# Patient Record
Sex: FEMALE | Race: WHITE | NOT HISPANIC OR LATINO | Employment: OTHER | URBAN - METROPOLITAN AREA
[De-identification: names, ages, dates, MRNs, and addresses within clinical notes are randomized per-mention and may not be internally consistent; named-entity substitution may affect disease eponyms.]

---

## 2017-01-11 ENCOUNTER — GENERIC CONVERSION - ENCOUNTER (OUTPATIENT)
Dept: OTHER | Facility: OTHER | Age: 72
End: 2017-01-11

## 2017-01-25 ENCOUNTER — ALLSCRIPTS OFFICE VISIT (OUTPATIENT)
Dept: OTHER | Facility: OTHER | Age: 72
End: 2017-01-25

## 2017-01-26 ENCOUNTER — ALLSCRIPTS OFFICE VISIT (OUTPATIENT)
Dept: RADIOLOGY | Facility: CLINIC | Age: 72
End: 2017-01-26
Payer: COMMERCIAL

## 2017-02-06 ENCOUNTER — GENERIC CONVERSION - ENCOUNTER (OUTPATIENT)
Dept: OTHER | Facility: OTHER | Age: 72
End: 2017-02-06

## 2017-03-16 DIAGNOSIS — R10.9 ABDOMINAL PAIN: ICD-10-CM

## 2017-06-20 ENCOUNTER — ALLSCRIPTS OFFICE VISIT (OUTPATIENT)
Dept: OTHER | Facility: OTHER | Age: 72
End: 2017-06-20

## 2017-07-21 DIAGNOSIS — Z12.31 ENCOUNTER FOR SCREENING MAMMOGRAM FOR MALIGNANT NEOPLASM OF BREAST: ICD-10-CM

## 2017-08-29 ENCOUNTER — GENERIC CONVERSION - ENCOUNTER (OUTPATIENT)
Dept: OTHER | Facility: OTHER | Age: 72
End: 2017-08-29

## 2017-09-12 ENCOUNTER — TRANSCRIBE ORDERS (OUTPATIENT)
Dept: ADMINISTRATIVE | Facility: HOSPITAL | Age: 72
End: 2017-09-12

## 2017-09-12 ENCOUNTER — HOSPITAL ENCOUNTER (OUTPATIENT)
Dept: ULTRASOUND IMAGING | Facility: HOSPITAL | Age: 72
Discharge: HOME | End: 2017-09-12
Payer: COMMERCIAL

## 2017-09-12 DIAGNOSIS — R31.9 HEMATURIA: ICD-10-CM

## 2017-09-12 DIAGNOSIS — R10.9 ABDOMINAL PAIN: ICD-10-CM

## 2017-09-12 DIAGNOSIS — R31.9 HEMATURIA SYNDROME: Primary | ICD-10-CM

## 2017-09-12 DIAGNOSIS — R31.9 HEMATURIA SYNDROME: ICD-10-CM

## 2017-09-12 DIAGNOSIS — N14.4 TOXIC NEPHROPATHY, NOT ELSEWHERE CLASSIFIED: ICD-10-CM

## 2017-09-12 DIAGNOSIS — S69.90XA UNSPECIFIED INJURY OF UNSPECIFIED WRIST, HAND AND FINGER(S), INITIAL ENCOUNTER: ICD-10-CM

## 2017-09-12 PROCEDURE — 76770 US EXAM ABDO BACK WALL COMP: CPT

## 2017-09-19 ENCOUNTER — GENERIC CONVERSION - ENCOUNTER (OUTPATIENT)
Dept: OTHER | Facility: OTHER | Age: 72
End: 2017-09-19

## 2017-09-22 ENCOUNTER — APPOINTMENT (OUTPATIENT)
Dept: LAB | Facility: CLINIC | Age: 72
End: 2017-09-22
Payer: COMMERCIAL

## 2017-09-22 ENCOUNTER — GENERIC CONVERSION - ENCOUNTER (OUTPATIENT)
Dept: OTHER | Facility: OTHER | Age: 72
End: 2017-09-22

## 2017-09-22 ENCOUNTER — TRANSCRIBE ORDERS (OUTPATIENT)
Dept: ADMINISTRATIVE | Facility: HOSPITAL | Age: 72
End: 2017-09-22

## 2017-09-22 DIAGNOSIS — R10.32 ABDOMINAL PAIN, LEFT LOWER QUADRANT: ICD-10-CM

## 2017-09-22 DIAGNOSIS — N14.4 TOXIC NEPHROPATHY, NOT ELSEWHERE CLASSIFIED: ICD-10-CM

## 2017-09-22 DIAGNOSIS — N14.4: Primary | ICD-10-CM

## 2017-09-22 LAB
BUN SERPL-MCNC: 19 MG/DL (ref 5–25)
CREAT SERPL-MCNC: 0.94 MG/DL (ref 0.6–1.3)
GFR SERPL CREATININE-BSD FRML MDRD: 60 ML/MIN/1.73SQ M

## 2017-09-22 PROCEDURE — 82565 ASSAY OF CREATININE: CPT

## 2017-09-22 PROCEDURE — 36415 COLL VENOUS BLD VENIPUNCTURE: CPT

## 2017-09-22 PROCEDURE — 84520 ASSAY OF UREA NITROGEN: CPT

## 2017-09-23 ENCOUNTER — HOSPITAL ENCOUNTER (OUTPATIENT)
Dept: CT IMAGING | Facility: HOSPITAL | Age: 72
Discharge: HOME | End: 2017-09-23
Payer: COMMERCIAL

## 2017-09-23 ENCOUNTER — GENERIC CONVERSION - ENCOUNTER (OUTPATIENT)
Dept: OTHER | Facility: OTHER | Age: 72
End: 2017-09-23

## 2017-09-23 DIAGNOSIS — R10.32 ABDOMINAL PAIN, LEFT LOWER QUADRANT: ICD-10-CM

## 2017-09-23 PROCEDURE — 74177 CT ABD & PELVIS W/CONTRAST: CPT

## 2017-09-23 RX ADMIN — IODIXANOL 100 ML: 320 INJECTION, SOLUTION INTRAVASCULAR at 12:09

## 2017-09-25 ENCOUNTER — APPOINTMENT (OUTPATIENT)
Dept: LAB | Facility: CLINIC | Age: 72
End: 2017-09-25
Payer: COMMERCIAL

## 2017-09-25 ENCOUNTER — GENERIC CONVERSION - ENCOUNTER (OUTPATIENT)
Dept: OTHER | Facility: OTHER | Age: 72
End: 2017-09-25

## 2017-09-25 DIAGNOSIS — N14.4: ICD-10-CM

## 2017-09-25 LAB
BUN SERPL-MCNC: 12 MG/DL (ref 5–25)
CREAT SERPL-MCNC: 0.9 MG/DL (ref 0.6–1.3)
GFR SERPL CREATININE-BSD FRML MDRD: 63 ML/MIN/1.73SQ M

## 2017-09-25 PROCEDURE — 36415 COLL VENOUS BLD VENIPUNCTURE: CPT

## 2017-09-25 PROCEDURE — 84520 ASSAY OF UREA NITROGEN: CPT

## 2017-09-25 PROCEDURE — 82565 ASSAY OF CREATININE: CPT

## 2017-09-26 ENCOUNTER — GENERIC CONVERSION - ENCOUNTER (OUTPATIENT)
Dept: OTHER | Facility: OTHER | Age: 72
End: 2017-09-26

## 2017-09-27 ENCOUNTER — GENERIC CONVERSION - ENCOUNTER (OUTPATIENT)
Dept: OTHER | Facility: OTHER | Age: 72
End: 2017-09-27

## 2017-10-07 ENCOUNTER — APPOINTMENT (EMERGENCY)
Dept: RADIOLOGY | Facility: HOSPITAL | Age: 72
End: 2017-10-07
Payer: COMMERCIAL

## 2017-10-07 ENCOUNTER — HOSPITAL ENCOUNTER (EMERGENCY)
Facility: HOSPITAL | Age: 72
Discharge: HOME | End: 2017-10-07
Attending: EMERGENCY MEDICINE | Admitting: EMERGENCY MEDICINE
Payer: COMMERCIAL

## 2017-10-07 ENCOUNTER — APPOINTMENT (EMERGENCY)
Dept: CT IMAGING | Facility: HOSPITAL | Age: 72
End: 2017-10-07
Payer: COMMERCIAL

## 2017-10-07 VITALS
SYSTOLIC BLOOD PRESSURE: 171 MMHG | DIASTOLIC BLOOD PRESSURE: 74 MMHG | OXYGEN SATURATION: 98 % | WEIGHT: 176.81 LBS | HEART RATE: 83 BPM | BODY MASS INDEX: 34.53 KG/M2 | TEMPERATURE: 99.8 F | RESPIRATION RATE: 16 BRPM

## 2017-10-07 DIAGNOSIS — S52.509A DISTAL RADIUS FRACTURE: Primary | ICD-10-CM

## 2017-10-07 DIAGNOSIS — S20.219A RIB CONTUSION: ICD-10-CM

## 2017-10-07 DIAGNOSIS — S00.03XA CONTUSION OF SCALP, INITIAL ENCOUNTER: ICD-10-CM
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
## 2017-10-07 PROCEDURE — 73110 X-RAY EXAM OF WRIST: CPT

## 2017-10-07 PROCEDURE — 99284 EMERGENCY DEPT VISIT MOD MDM: CPT

## 2017-10-07 PROCEDURE — 70450 CT HEAD/BRAIN W/O DYE: CPT

## 2017-10-07 PROCEDURE — 71101 X-RAY EXAM UNILAT RIBS/CHEST: CPT

## 2017-10-07 PROCEDURE — 72125 CT NECK SPINE W/O DYE: CPT

## 2017-10-07 PROCEDURE — 72100 X-RAY EXAM L-S SPINE 2/3 VWS: CPT

## 2017-10-07 PROCEDURE — 96372 THER/PROPH/DIAG INJ SC/IM: CPT

## 2017-10-07 RX ORDER — ONDANSETRON 4 MG/1
4 TABLET, ORALLY DISINTEGRATING ORAL EVERY 8 HOURS PRN
Qty: 20 TABLET | Refills: 0 | Status: SHIPPED | OUTPATIENT
Start: 2017-10-07 | End: 2018-02-12

## 2017-10-07 RX ORDER — OXYCODONE HYDROCHLORIDE AND ACETAMINOPHEN 5; 325 MG/1; MG/1
1 TABLET ORAL EVERY 6 HOURS PRN
Qty: 28 TABLET | Refills: 0 | Status: SHIPPED | OUTPATIENT
Start: 2017-10-07 | End: 2017-10-17

## 2017-10-07 RX ORDER — GLIMEPIRIDE 4 MG/1
4 TABLET ORAL 2 TIMES DAILY
COMMUNITY
End: 2018-02-12

## 2017-10-07 RX ORDER — TOLTERODINE 4 MG/1
4 CAPSULE, EXTENDED RELEASE ORAL DAILY
COMMUNITY
End: 2018-02-12

## 2017-10-07 RX ORDER — LIDOCAINE 50 MG/G
1 PATCH TOPICAL DAILY
Qty: 30 PATCH | Refills: 0 | Status: SHIPPED | OUTPATIENT
Start: 2017-10-07 | End: 2018-02-12

## 2017-10-07 RX ORDER — METOCLOPRAMIDE 5 MG/1
5 TABLET ORAL 3 TIMES DAILY
COMMUNITY
End: 2018-01-24 | Stop reason: SDUPTHER

## 2017-10-07 RX ORDER — ONDANSETRON 4 MG/1
4 TABLET, ORALLY DISINTEGRATING ORAL ONCE
Status: COMPLETED | OUTPATIENT
Start: 2017-10-07 | End: 2017-10-07

## 2017-10-07 RX ORDER — LIDOCAINE 50 MG/G
1 PATCH TOPICAL ONCE
Status: DISCONTINUED | OUTPATIENT
Start: 2017-10-07 | End: 2017-10-07 | Stop reason: HOSPADM

## 2017-10-07 RX ADMIN — HYDROMORPHONE HYDROCHLORIDE 1 MG: 1 INJECTION, SOLUTION INTRAMUSCULAR; INTRAVENOUS; SUBCUTANEOUS at 14:27

## 2017-10-07 RX ADMIN — ONDANSETRON 4 MG: 4 TABLET, ORALLY DISINTEGRATING ORAL at 15:14

## 2017-10-07 RX ADMIN — LIDOCAINE 1 PATCH: 50 PATCH CUTANEOUS at 15:54

## 2017-10-07 NOTE — ED PROVIDER NOTES
History  Chief Complaint   Patient presents with    Fall     fell in bathroom  struck her head on the tub, has pain in her back and her right wrist, as well as her head     68-year-old female comes out after a fall  Patient states she slipped and fell in her bathroom when she struck her head and she also thinks that she broke her right wrist   Did not lose consciousness remembers everything that happened also complains of some lower back pain, as well as some left lower rib pain  Patient has a spine stimulator in and she is concerned that it is still intact  Patient is on several medications for pain including a 25 mcg Duragesic patch and Percocet for breakthrough pain  Did not take any Percocet today  The patient is not on any blood thinners          History provided by:  Patient   used: No    Fall   Mechanism of injury: fall    Injury location:  Head/neck, shoulder/arm and torso  Head/neck injury location:  Head  Shoulder/arm injury location:  R wrist  Torso injury location:  Back  Incident location:  Bathroom  Time since incident:  2 hours  Arrived directly from scene: no    Fall:     Fall occurred:  Walking and tripped    Impact surface:  Hard floor    Point of impact:  Unable to specify    Entrapped after fall: no    Protective equipment: none    Suspicion of alcohol use: no    Suspicion of drug use: no    Tetanus status:  Up to date  Prior to arrival data:     Bystander interventions:  None    Patient ambulatory at scene: yes      Blood loss:  None    Responsiveness at scene:  Alert    Orientation at scene:  Person, place, situation and time    Loss of consciousness: no      Amnesic to event: no      Airway interventions:  None    Breathing interventions:  None    IV access status:  None    IO access:  None    Fluids administered:  None    Cardiac interventions:  None    Immobilization:  None  Associated symptoms: no abdominal pain, no back pain, no chest pain and no headaches Prior to Admission Medications   Prescriptions Last Dose Informant Patient Reported? Taking? Azilsartan Medoxomil (EDARBI) 40 MG TABS   Yes Yes   Sig: Take by mouth daily  Lutein 10 MG TABS   Yes Yes   Sig: Take by mouth daily  dexlansoprazole (DEXILANT) 60 MG capsule   Yes Yes   Sig: Take 60 mg by mouth daily  fentaNYL (DURAGESIC) 25 mcg/hr   Yes Yes   Sig: Place 1 patch on the skin every third day  glimepiride (AMARYL) 4 mg tablet   Yes Yes   Sig: Take 4 mg by mouth 2 (two) times a day   metFORMIN (GLUCOPHAGE) 1000 MG tablet   Yes Yes   Sig: Take 1,000 mg by mouth 2 (two) times a day with meals   metoclopramide (REGLAN) 5 mg tablet   Yes Yes   Sig: Take 5 mg by mouth 3 (three) times a day   oxyCODONE-acetaminophen (PERCOCET)  mg per tablet   Yes Yes   Sig: Take 1 tablet by mouth every 6 (six) hours as needed for moderate pain     pitavastatin (LIVALO) 2 mg   Yes Yes   Sig: Take 1 mg by mouth daily with dinner  pregabalin (LYRICA) 50 mg capsule   Yes Yes   Sig: Take 50 mg by mouth daily at bedtime as needed     tolterodine (DETROL LA) 4 mg 24 hr capsule   Yes Yes   Sig: Take 4 mg by mouth daily      Facility-Administered Medications: None       Past Medical History:   Diagnosis Date    Acid reflux     Anxiety     Arthritis     Chronic narcotic dependence (HCC)     Chronic pain     Colon polyp     Cystocele     Diabetes mellitus ([facility] [address] )     Diverticulosis     Fibromyalgia     Gastric ulcer     Gastroparesis     Hyperlipidemia     Hypertension     IBS (irritable bowel syndrome)     Nephrolithiasis     Post laminectomy syndrome     Seasonal allergies     Spinal stenosis        Past Surgical History:   Procedure Laterality Date    APPENDECTOMY      CHOLECYSTECTOMY      ESOPHAGOGASTRODUODENOSCOPY N/A 9/28/2016    Procedure: ESOPHAGOGASTRODUODENOSCOPY (EGD); Surgeon: Edgar Alvarado MD;  [facility];   Service:     HERNIA REPAIR      HYSTERECTOMY      LAMINECTOMY  KS COLONOSCOPY FLX DX W/COLLJ SPEC WHEN PFRMD N/A 3/2/2016    Procedure: EGD AND COLONOSCOPY;  Surgeon: Richie Alberto MD;  [facility]; Service: Gastroenterology    KS DILATE ESOPHAGUS N/A 9/28/2016    Procedure: DILATATION ESOPHAGEAL;  Surgeon: Richie Alberto MD;  [facility]; Service: Gastroenterology    KS ESOPHAGOGASTRODUODENOSCOPY TRANSORAL DIAGNOSTIC N/A 7/18/2016    Procedure: ESOPHAGOGASTRODUODENOSCOPY (EGD); Surgeon: Richie Alberto MD;  [facility]; Service: Gastroenterology       History reviewed  No pertinent family history  I have reviewed and agree with the history as documented  Social History   Substance Use Topics    Smoking status: Former Smoker    Smokeless tobacco: Never Used    Alcohol use No        Review of Systems   Constitutional: Negative for fatigue and fever  HENT: Negative for congestion and ear pain  Eyes: Negative for discharge and redness  Respiratory: Negative for apnea, cough, shortness of breath and wheezing  Cardiovascular: Negative for chest pain  Gastrointestinal: Negative for abdominal pain and diarrhea  Endocrine: Negative for cold intolerance and polydipsia  Genitourinary: Negative for difficulty urinating and hematuria  Musculoskeletal: Negative for arthralgias and back pain  Skin: Negative for color change and rash  Allergic/Immunologic: Negative for environmental allergies and immunocompromised state  Neurological: Negative for numbness and headaches  Hematological: Negative for adenopathy  Does not bruise/bleed easily  Psychiatric/Behavioral: Negative for agitation and behavioral problems         Physical Exam  ED Triage Vitals [10/07/17 1358]   Temperature Pulse Respirations Blood Pressure SpO2   99 8 °F (37 7 °C) 90 18 154/70 97 %      Temp Source Heart Rate Source Patient Position - Orthostatic VS BP Location FiO2 (%)   Oral Monitor Lying Right arm --      Pain Score       Worst Possible Pain Physical Exam   Constitutional: She is oriented to person, place, and time  Vital signs are normal  She appears well-developed and well-nourished  Non-toxic appearance  HENT:   Head: Normocephalic and atraumatic  Right Ear: Tympanic membrane and external ear normal    Left Ear: Tympanic membrane and external ear normal    Nose: Nose normal  No rhinorrhea, sinus tenderness or nasal deformity  Mouth/Throat: Uvula is midline and oropharynx is clear and moist  Normal dentition  Eyes: Conjunctivae, EOM and lids are normal  Pupils are equal, round, and reactive to light  Right eye exhibits no discharge  Left eye exhibits no discharge  Neck: Trachea normal and normal range of motion  Neck supple  No JVD present  Carotid bruit is not present  Cardiovascular: Normal rate, regular rhythm, intact distal pulses and normal pulses  No extrasystoles are present  PMI is not displaced  Pulmonary/Chest: Effort normal and breath sounds normal  No accessory muscle usage  No respiratory distress  She has no wheezes  She has no rhonchi  She has no rales  Abdominal: Soft  Normal appearance and bowel sounds are normal  She exhibits no mass  There is no tenderness  There is no rigidity, no rebound and no guarding  Musculoskeletal:        Right shoulder: She exhibits normal range of motion, no bony tenderness, no swelling and no deformity  Cervical back: Normal  She exhibits normal range of motion, no tenderness, no bony tenderness and no deformity  Lymphadenopathy:     She has no cervical adenopathy  She has no axillary adenopathy  Neurological: She is alert and oriented to person, place, and time  She has normal strength and normal reflexes  No cranial nerve deficit or sensory deficit  GCS eye subscore is 4  GCS verbal subscore is 5  GCS motor subscore is 6  Skin: Skin is warm and dry  No rash noted  Psychiatric: She has a normal mood and affect   Her speech is normal and behavior is normal  Nursing note and vitals reviewed  ED Medications  Medications   lidocaine (LIDODERM) 5 % patch 1 patch (1 patch Transdermal Medication Applied 10/7/17 8315)   HYDROmorphone (DILAUDID) 1 mg/mL injection 1 mg (1 mg Intramuscular Given 10/7/17 1427)   ondansetron (ZOFRAN-ODT) dispersible tablet 4 mg (4 mg Oral Given 10/7/17 1514)       Diagnostic Studies  Labs Reviewed - No data to display    XR wrist 3+ views RIGHT   Final Result   Comminuted impacted fracture distal radius with dorsal displacement and dorsal angulation of distal fragment  ##imslh##imslh         Workstation performed: [id number]         CT cervical spine without contrast   Final Result   No cervical spine fracture or traumatic malalignment  Workstation performed: [id number]         CT head without contrast   Final Result   No acute intracranial abnormality  Workstation performed: [id number]         XR lumbar spine 2 or 3 views   Final Result      Diffuse lumbar degenerative disc disease similar to prior CT  Workstation performed: [id number]         XR ribs left w pa chest min 3 views    (Results Pending)       Procedures  Procedures      Phone Contacts  ED Phone Contact    ED Course  ED Course                                MDM  Number of Diagnoses or Management Options  Contusion of scalp, initial encounter: new and requires workup  Distal radius fracture: new and requires workup  Fall, initial encounter: new and requires workup  Rib contusion: new and requires workup     Amount and/or Complexity of Data Reviewed  Tests in the radiology section of CPT®: ordered and reviewed  Independent visualization of images, tracings, or specimens: yes (Wrist x-ray shows distal radius fracture with mild displacement    Rib series shows no fractures)    Risk of Complications, Morbidity, and/or Mortality  General comments: Discussed with patient and her daughter who is a physician that she will need follow-up with Orthopedics this week  Patient's daughter states that she is going to call Dr Oksana Leung to seen evaluate the patient for further treatment  Patient Progress  Patient progress: improved    CritCare Time    Disposition  Final diagnoses:   Distal radius fracture   Fall, initial encounter   Rib contusion   Contusion of scalp, initial encounter     ED Disposition     ED Disposition Condition Comment    Discharge  [address] discharge to home/self care  Condition at discharge: Good        Follow-up Information     Follow up With Specialties Details Why [facility] Schedule an appointment as soon as possible for a visit  [address]  [phone]          Discharge Medication List as of 10/7/2017  3:55 PM      START taking these medications    Details   lidocaine (LIDODERM) 5 % Place 1 patch on the skin daily Remove & Discard patch within 12 hours or as directed by MD, Starting Sat 10/7/2017, Print      ondansetron (ZOFRAN-ODT) 4 mg disintegrating tablet Take 1 tablet by mouth every 8 (eight) hours as needed for nausea or vomiting for up to 7 days, Starting Sat 10/7/2017, Until Sat 10/14/2017, Print      oxyCODONE-acetaminophen (PERCOCET) 5-325 mg per tablet Take 1 tablet by mouth every 6 (six) hours as needed for severe pain for up to 10 days Max Daily Amount: 4 tablets, Starting Sat 10/7/2017, Until Tue 10/17/2017, Print         CONTINUE these medications which have NOT CHANGED    Details   Azilsartan Medoxomil (EDARBI) 40 MG TABS Take by mouth daily  , Until Discontinued, Historical Med      dexlansoprazole (DEXILANT) 60 MG capsule Take 60 mg by mouth daily  , Until Discontinued, Historical Med      fentaNYL (DURAGESIC) 25 mcg/hr Place 1 patch on the skin every third day , Until Discontinued, Historical Med      glimepiride (AMARYL) 4 mg tablet Take 4 mg by mouth 2 (two) times a day, Historical Med      Lutein 10 MG TABS Take by mouth daily  , Until Discontinued, Historical Med      metFORMIN (GLUCOPHAGE) 1000 MG tablet Take 1,000 mg by mouth 2 (two) times a day with meals, Historical Med      metoclopramide (REGLAN) 5 mg tablet Take 5 mg by mouth 3 (three) times a day, Historical Med      oxyCODONE-acetaminophen (PERCOCET)  mg per tablet Take 1 tablet by mouth every 6 (six) hours as needed for moderate pain  , Historical Med      pitavastatin (LIVALO) 2 mg Take 1 mg by mouth daily with dinner , Until Discontinued, Historical Med      pregabalin (LYRICA) 50 mg capsule Take 50 mg by mouth daily at bedtime as needed  , Historical Med      tolterodine (DETROL LA) 4 mg 24 hr capsule Take 4 mg by mouth daily, Historical Med           No discharge procedures on file      ED Provider  Electronically Signed by       Shobha Clemente DO  10/07/17 7909

## 2017-10-07 NOTE — DISCHARGE INSTRUCTIONS
Arm Fracture in Adults   WHAT YOU NEED TO KNOW:   An arm fracture is a crack or break in one or more of the bones in your arm  An arm fracture may be caused by a fall onto an outstretched hand  It may also be caused by trauma from a car accident or a sports injury  Osteoporosis (brittle bones) can increase your risk for a fracture  DISCHARGE INSTRUCTIONS:   Return to the emergency department if:   · The pain in your injured arm does not get better or gets worse, even after you rest and take medicine  · Your injured arm, hand, or fingers feel numb  · Your arm is swollen, red, and feels warm  · Your skin over the arm fracture is swollen, cold, or pale  · You cannot move your arm, hand, or fingers  Contact your healthcare provider if:   · You have a fever  · Your brace or splint becomes wet, damaged, or comes off  · You have questions or concerns about your injury, treatment, or care  Medicines:   · NSAIDs , such as ibuprofen, help decrease swelling and pain  This medicine is available with or without a doctor's order  NSAIDs can cause stomach bleeding or kidney problems in certain people  If you take blood thinner medicine, always ask your healthcare provider if NSAIDs are safe for you  Always read the medicine label and follow directions  · Acetaminophen  decreases pain  It is available without a doctor's order  Ask how much to take and how often to take it  Follow directions  Acetaminophen can cause liver damage if not taken correctly  · Prescription pain medicine  may be given  Ask how to take this medicine safely  · Take your medicine as directed  Contact your healthcare provider if you think your medicine is not helping or if you have side effects  Tell him or her if you are allergic to any medicine  Keep a list of the medicines, vitamins, and herbs you take  Include the amounts, and when and why you take them  Bring the list or the pill bottles to follow-up visits   Carry your medicine list with you in case of an emergency  Follow up with your healthcare provider within 1 week: You may need to see a bone specialist within 3 to 4 days if you need surgery or further treatment for your arm fracture  Write down your questions so you remember to ask them during your visits  Rest:  You should rest your arm as much as possible  Ask your healthcare provider when you can put pressure or weight on your arm  Also ask when you can return to sports or vigorous exercises  Ice:  Apply ice on your arm for 15 to 20 minutes every hour or as directed  Use an ice pack, or put crushed ice in a plastic bag  Cover it with a towel  Ice helps prevent tissue damage and decreases swelling and pain  Elevate:  Elevate your arm above the level of your heart as often as you can  This will help decrease swelling and pain  Prop your arm on pillows or blankets to keep it elevated comfortably  Care for your cast or splint:  Ask your healthcare provider when it is okay to bathe  Do not get your cast or splint wet  Before you take a bath or shower, cover your cast or splint with a plastic bag  Tape the bag to your skin to help keep water out  Hold your arm away from the water in case the bag leaks  · Check the skin around your cast or splint each day for any redness or open skin  · Do not use a sharp or pointed object to scratch your skin under the cast or splint  Physical therapy:  A physical therapist teaches you exercises to help improve movement and strength, and to decrease pain  © 2017 [employer] Information is for End User's use only and may not be sold, redistributed or otherwise used for commercial purposes  All illustrations and images included in CareNotes® are the copyrighted property of [employer]  or [employer]  The above information is an  only  It is not intended as medical advice for individual conditions or treatments   Talk to your doctor, nurse or pharmacist before following any medical regimen to see if it is safe and effective for you

## 2017-10-09 ENCOUNTER — APPOINTMENT (OUTPATIENT)
Dept: RADIOLOGY | Facility: CLINIC | Age: 72
End: 2017-10-09
Payer: COMMERCIAL

## 2017-10-09 ENCOUNTER — ALLSCRIPTS OFFICE VISIT (OUTPATIENT)
Dept: OTHER | Facility: OTHER | Age: 72
End: 2017-10-09

## 2017-10-09 DIAGNOSIS — S69.90XA UNSPECIFIED INJURY OF UNSPECIFIED WRIST, HAND AND FINGER(S), INITIAL ENCOUNTER: ICD-10-CM

## 2017-10-09 PROCEDURE — 73110 X-RAY EXAM OF WRIST: CPT

## 2017-10-10 NOTE — PROGRESS NOTES
Plan  Wrist injury    · * XR WRIST 3+ VIEW RIGHT; Status:Active - Retrospective By Protocol Authorization; Requested GFW:[id number];    · * XR WRIST 3+ VIEW RIGHT; Status:Active; Requested FIP:[id number];     Discussion/Summary    Right distal radius fractureAttempt at reduction of distal radius metaphyseal type  AP view looks perfect the lateral view shows some dorsal translation but the articular surface is neutral  We went over the pros and cons of surgical intervention versus non operative intervention  She would like to proceed with non operative intervention she will come back to see us in 2 weeks  If she has dorsal angulation or worsening translation or impaction we may look at doing surgery  She understands this as well  Chief Complaint  1  Wrist Pain    History of Present Illness  HPI: Patient comes in with regards to her right wrist  She had a fall after losing her balance she tried to grab the shower curtain and fell  She subsequently injured her right wrist  She was taken to ER where x-rays were taken she is placed in a volar splint and told to follow up with the at Orthopedics in the office  Review of Systems    Constitutional: No fever, no chills, feels well, no tiredness, no recent weight gain or loss  Eyes: No complaints of eyesight problems, no red eyes  ENT: no loss of hearing, no nosebleeds, no sore throat  Cardiovascular: No complaints of chest pain, no palpitations, no leg claudication or lower extremity edema  Respiratory: no compliants of shortness of breath, no wheezing, no cough  Gastrointestinal: no complaints of abdominal pain, no constipation, no nausea or diarrhea, no vomiting, no bloody stools  Genitourinary: no complaints of dysuria, no incontinence  Musculoskeletal: as noted in HPI  Integumentary: no complaints of skin rash or lesion, no itching or dry skin, no skin wounds     Neurological: no complaints of headache, no confusion, no numbness or tingling, no dizziness  Endocrine: No complaints of muscle weakness, no feelings of weakness, no frequent urination, no excessive thirst    Psychiatric: No suicidal thoughts, no anxiety, no feelings of depression  Active Problems  1  Abdominal discomfort (789 00) (R10 9)   2  Acute colitis (558 9) (K52 9)   3  Acute diverticulitis (562 11) (K57 92)   4  Acute duodenal ulcer (532 30) (K26 3)   5  Acute gastritis (535 00) (K29 00)   6  Acute left lower quadrant pain (789 04,338 19) (R10 32)   7  Acute sinusitis (461 9) (J01 90)   8  Acute upper respiratory infection (465 9) (J06 9)   9  Allergic rhinitis (477 9) (J30 9)   10  Allergy to iodine compound (V14 8) (Z88 8)   11  Analgesic use (V58 69) (Z79 899)   12  Anxiety (300 00) (F41 9)   13  Arthritis (716 90) (M19 90)   14  Bilateral flank pain (789 09) (R10 9)   15  Bloating (787 3) (R14 0)   16  Change in bowel habits (787 99) (R19 4)   17  Chronic pain syndrome (338 4) (G89 4)   18  Cystocele, midline (618 01) (N81 11)   19  Degenerative lumbar spinal stenosis (724 02) (M48 061)   20  Diabetes mellitus, type 2 (250 00) (E11 9)   21  Disc degeneration, lumbar (722 52) (M51 36)   22  Dyspepsia (536 8) (K30)   23  Edema (782 3) (R60 9)   24  Encounter for gynecological examination with abnormal finding (V72 31) (Z01 411)   25  Encounter for routine gynecological examination (V72 31) (Z01 419)   26  Encounter for screening mammogram for malignant neoplasm of breast (V76 12)    (Z12 31)   27  Fibromyalgia (729 1) (M79 7)   28  Flank pain (789 09) (R10 9)   29  Generalized abdominal pain (789 07) (R10 84)   30  Glaucoma (365 9) (H40 9)   31  Hematuria (599 70) (R31 9)   32  Hernia (553 9) (K46 9)   33  High cholesterol (272 0) (E78 00)   34  Hip Pain Elicited By Motion   35  History of colon polyps (V12 72) (Z86 010)   36  Hyperglycemia (790 29) (R73 9)   37  Hypertension (401 9) (I10)   38  Influenza vaccine needed (V04 81) (Z23)   39   Irritable bowel syndrome (564 1) (K58 9)   40  Lumbar radiculopathy (724 4) (M54 16)   41  Mammogram abnormal (793 80) (R92 8)   42  Medicare annual wellness visit, initial (V70 0) (Z00 00)   43  Myofascial pain syndrome (729 1) (M79 1)   44  Need for pneumococcal vaccination (V03 82) (Z23)   45  Need for TD vaccine (V06 5) (Z23)   46  Nephrolithiasis (592 0) (N20 0)   47  Nephrotoxicity (584 5) (N14 4)   48  Overactive bladder (596 51) (N32 81)   49  Postlaminectomy syndrome, lumbar (722 83) (M96 1)   50  PPD screening test (V74 1) (Z11 1)   51  Screening for diabetic retinopathy (V80 2) (Z13 5)   52  Tuberculosis screening (V74 1) (Z11 1)   53  Urinary incontinence (788 30) (R32)   54  Urinary tract infection (599 0) (N39 0)   55  Vaginal wall prolapse (618 00) (N81 10)   56  Vaginal yeast infection (112 1) (B37 3)   57  Vertigo (780 4) (R42)   58  Visit for screening mammogram (V76 12) (Z12 31)   59  Wrist injury (959 3) (S69 90XA)    Past Medical History   · History of Asthma (493 90) (J45 909)   · History of colonic polyps (V12 72) (Z86 010)   · History of dysfunctional uterine bleeding (V13 29) (Z87 42)   · History of gastroesophageal reflux (GERD) (V12 79) (Z87 19)   · History of hypercholesterolemia (V12 29) (Z86 39)    Surgical History   · History of Appendectomy   · History of Cholecystectomy   · History of Hernia Repair   · History of Hysterectomy   · History of Laminectomy Lumbar   · History of Total Abdominal Hysterectomy With Removal Of Both Ovaries    The surgical history was reviewed and updated today         Family History  Mother    · Denied: Family history of Colon cancer   · Denied: Family history of Crohn's disease   · Denied: Family history of liver disease   · Family history of Lung Cancer (V16 1)  Father    · Denied: Family history of Colon cancer   · Denied: Family history of Crohn's disease   · Denied: Family history of liver disease  Family History    · Family history of Diabetes Mellitus (V18 0)   · Family history of Hypertension (V17 49)   · Family history of Stroke Complications    The family history was reviewed and updated today  Social History   · Denied: History of Alcohol Use (History)   · Denied: History of Current Every Day Smoker (305 1)   · Denied: History of Drug Use (305 90)   · Former smoker (V15 82) (Q90 594)   · Marital History - Currently    · Never A Smoker   · Occupation: Retired  The social history was reviewed and updated today  Current Meds   1  Dicyclomine HCl - 20 MG Oral Tablet; TAKE 1 TABLET Every 6 hours PRN SPASMS; Therapy: 76Hwr7679 to (Evaluate:05Lxc0182)  Requested for: 42Xyj6356; Last   Rx:92Mxl1147 Ordered   2  Edarbi 40 MG Oral Tablet; Take 1 tablet daily Recorded   3  FentaNYL 50 MCG/HR Transdermal Patch 72 Hour; APPLY 1 PATCH EVERY 3 DAYS; Therapy: 45WOO6050 to (Renew:27Oct2017); Last Rx:90Ykj6791 Ordered   4  HydroCHLOROthiazide 25 MG Oral Tablet; TAKE 1 TABLET DAILY; Therapy: 85YYQ0950 to ([facility])  Requested for: 49RAO4240; Last   Rx:85Bhc1576 Ordered   5  Januvia 100 MG Oral Tablet; TAKE 1 TABLET DAILY; Therapy: 41Pgh9966 to (Evaluate:23Hsc8002); Last Rx:35Jgq0778 Ordered   6  Latanoprost 0 005 % Ophthalmic Solution; INSTILL 1 DROP IN BOTH EYES AT   BEDTIME; Therapy: 16Kgt7418 to (Evaluate:22End1246)  Requested for: 89Uev4093; Last   Rx:14Bse4237 Ordered   7  Livalo 2 MG Oral Tablet; Therapy: 57FSC2150 to Recorded   8  Lyrica 50 MG Oral Capsule; Therapy: 84OEN8157 to Recorded   9  Meclizine HCl - 25 MG Oral Tablet; 1/2 to 1 tab tid prn; Therapy: 47VWU1462 to (Gwenette Bones)  Requested for: 14JHI6955; Last   Rx:09Oct2017 Ordered   10  MetFORMIN HCl - 1000 MG Oral Tablet; take one tablet by mouth twice daily; Therapy: 00Hqo3790 to (Evaluate:55Cwn1249); Last Rx:56Ynd7405 Ordered   11  MethylPREDNISolone 32 MG Oral Tablet; 1 tab 12hrs prior, 1 tab 2 hours prior;     Therapy: 41ZGV4041 to (Last Jo Ann Binet)  Requested for: 36UPN5256 Ordered 12  Nitrofurantoin Macrocrystal 50 MG Oral Capsule; TAKE 1 CAPSULE AT BEDTIME; Therapy: 26BYY2889 to (Evaluate:16Apr2017); Last Rx:61Lyl3734 Ordered   13  Oxybutynin Chloride ER 15 MG Oral Tablet Extended Release 24 Hour; Take 1 tablet    twice daily; Therapy: 88FHZ0933 to (Pilo Marsh)  Requested for: 71Bqx1832; Last    Rx:29Cct8638; Status: ACTIVE - Renewal Denied Ordered   14  Oxycodone-Acetaminophen  MG Oral Tablet; TAKE 1 TABLET Every 6 hours PRN; Therapy: 80GJJ7775 to (Last KI:38XOH1930)  Requested for: 45INV8338 Ordered   15  Pantoprazole Sodium 40 MG Oral Tablet Delayed Release; TAKE 1 TABLET TWICE    DAILY 30 MINUTES BEFORE BREAKFAST AND DINNER; Therapy: 73CBS6325 to (Judy Ludwig)  Requested for: 55MXZ5452; Last    Rx:02Mar2016 Ordered   16  PredniSONE 5 MG Oral Tablet; TAKE 3 TABLET Twice daily for 2 days,t then 2 bid for two    days, 1 bid for 2 days,then 1 daily for 2 days and stop; Therapy: 98HZF7209 to (Complete:17Oct2017)  Requested for: 27SAW3660; Last    Rx:09Jkn8987 Ordered   17  Savella 50 MG Oral Tablet; Take 1 tablet daily; Therapy: 85UGC1671 to (Evaluate:23Jun2017); Last Rx:55Lui2163 Ordered   18  Uribel CAPS Recorded    The medication list was reviewed and updated today  Allergies  1  Aspartame (Nutrasweet) POWD   2  Contrast Media Ready-Box MISC   3  Penicillins   4  Sulfa Drugs    Vitals  Signs   Heart Rate: 759  Systolic: 171  Diastolic: 73  Height: 5 ft 1 in  Weight: 177 lb 6 08 oz  BMI Calculated: 33 52  BSA Calculated: 1 79    Physical Exam  No shortness of breath no appreciable erythema  Examination of the hand she is in a volar splint  Neurovascularly intact  There is obvious deformity on the dorsal aspect of the wrist when the splint is removed       No audible wheeze   Constitutional - General appearance: Normal    Musculoskeletal - Digits and nails: Normal -Inspection/palpation of joints, bones, and muscles: Normal -Muscle strength/tone: Normal -Upper extremity compartments: Normal    Cardiovascular - Pulses: Normal    Lymphatic - Palpation of lymph nodes in other areas: Normal    Skin - Skin and subcutaneous tissue: Normal -Palpation of skin and subcutaneous tissue: Normal    Neurologic - Reflexes: Normal -Sensation: Normal -Upper extremity peripheral neuro exam: Normal    Psychiatric - Orientation to person, place, and time: Normal -Mood and affect: Normal    Eyes   Conjunctiva and lids: Normal        Results/Data  I personally reviewed the films/images/results in the office today  My interpretation follows  X-ray Review Dorsally angulated and displaced distal radius fracture, there is also ulnar positivity  on her original x-rays we do not have any reduction x-rays although it was stated that they did not reduce in the ER just put a splint on her     her repeat x-ray AP view looks excellent the ulnar positivity has been corrected  The dorsal angulation has been corrected but there is dorsal translation of approximately 5-7 mm   x-ray show  Procedure  Area is prepped with alcohol ethyl chloride and alcohol again injected 8 cc of like to a cane into the fracture site using a 22 gauge needle  Patient tolerated the procedure some mild amount of bleeding had occurred  waited 20 minutes re-examined attempted reduction and placed a sugar-tong splint  We were trying to translate that piece more anteriorly  Patient not tolerate traction or compression as much as we would have liked  We offered repeat injection she declined   We then repeat the x-ray after application of splint      Future Appointments    Date/Time Provider Specialty Site   07/17/2018 03:40 PM Stacie Whitten MD Obstetrics/Gynecology [facility]   10/25/2017 09:40 AM Jeanine Olson DO Orthopedic Surgery [facility] [address]     Signatures   Electronically signed by : Keny Abernathy DO; Oct  9 2017  2:45PM EST                       (Author)

## 2017-10-25 ENCOUNTER — GENERIC CONVERSION - ENCOUNTER (OUTPATIENT)
Dept: OTHER | Facility: OTHER | Age: 72
End: 2017-10-25

## 2017-10-25 ENCOUNTER — APPOINTMENT (OUTPATIENT)
Dept: RADIOLOGY | Facility: CLINIC | Age: 72
End: 2017-10-25
Payer: COMMERCIAL

## 2017-10-25 DIAGNOSIS — S52.501A CLOSED FRACTURE OF LOWER END OF RIGHT RADIUS: ICD-10-CM

## 2017-10-25 DIAGNOSIS — E11.9 TYPE 2 DIABETES MELLITUS WITHOUT COMPLICATIONS (HCC): ICD-10-CM

## 2017-10-25 DIAGNOSIS — E03.9 HYPOTHYROIDISM: ICD-10-CM

## 2017-10-25 DIAGNOSIS — I10 ESSENTIAL (PRIMARY) HYPERTENSION: ICD-10-CM

## 2017-10-25 DIAGNOSIS — E78.00 PURE HYPERCHOLESTEROLEMIA: ICD-10-CM

## 2017-10-25 DIAGNOSIS — S69.90XA UNSPECIFIED INJURY OF UNSPECIFIED WRIST, HAND AND FINGER(S), INITIAL ENCOUNTER: ICD-10-CM

## 2017-10-25 PROCEDURE — 73110 X-RAY EXAM OF WRIST: CPT

## 2017-11-01 ENCOUNTER — GENERIC CONVERSION - ENCOUNTER (OUTPATIENT)
Dept: OTHER | Facility: OTHER | Age: 72
End: 2017-11-01

## 2017-11-20 ENCOUNTER — APPOINTMENT (OUTPATIENT)
Dept: LAB | Facility: CLINIC | Age: 72
End: 2017-11-20
Payer: COMMERCIAL

## 2017-11-20 ENCOUNTER — TRANSCRIBE ORDERS (OUTPATIENT)
Dept: LAB | Facility: CLINIC | Age: 72
End: 2017-11-20

## 2017-11-20 ENCOUNTER — APPOINTMENT (OUTPATIENT)
Dept: RADIOLOGY | Facility: CLINIC | Age: 72
End: 2017-11-20
Payer: COMMERCIAL

## 2017-11-20 ENCOUNTER — GENERIC CONVERSION - ENCOUNTER (OUTPATIENT)
Dept: OTHER | Facility: OTHER | Age: 72
End: 2017-11-20

## 2017-11-20 DIAGNOSIS — E78.00 PURE HYPERCHOLESTEROLEMIA: ICD-10-CM

## 2017-11-20 DIAGNOSIS — I10 ESSENTIAL (PRIMARY) HYPERTENSION: ICD-10-CM

## 2017-11-20 DIAGNOSIS — E03.9 HYPOTHYROIDISM: ICD-10-CM

## 2017-11-20 DIAGNOSIS — E11.9 TYPE 2 DIABETES MELLITUS WITHOUT COMPLICATIONS (HCC): ICD-10-CM

## 2017-11-20 DIAGNOSIS — S52.501A CLOSED FRACTURE OF LOWER END OF RIGHT RADIUS: ICD-10-CM

## 2017-11-20 LAB
ALBUMIN SERPL BCP-MCNC: 3.4 G/DL (ref 3.5–5)
ALP SERPL-CCNC: 101 U/L (ref 46–116)
ALT SERPL W P-5'-P-CCNC: 19 U/L (ref 12–78)
ANION GAP SERPL CALCULATED.3IONS-SCNC: 10 MMOL/L (ref 4–13)
AST SERPL W P-5'-P-CCNC: 18 U/L (ref 5–45)
BILIRUB SERPL-MCNC: 0.2 MG/DL (ref 0.2–1)
BUN SERPL-MCNC: 21 MG/DL (ref 5–25)
CALCIUM SERPL-MCNC: 9.2 MG/DL (ref 8.3–10.1)
CHLORIDE SERPL-SCNC: 101 MMOL/L (ref 100–108)
CHOLEST SERPL-MCNC: 169 MG/DL (ref 50–200)
CO2 SERPL-SCNC: 25 MMOL/L (ref 21–32)
CREAT SERPL-MCNC: 0.88 MG/DL (ref 0.6–1.3)
CREAT UR-MCNC: 110 MG/DL
EST. AVERAGE GLUCOSE BLD GHB EST-MCNC: 157 MG/DL
GFR SERPL CREATININE-BSD FRML MDRD: 65 ML/MIN/1.73SQ M
GLUCOSE P FAST SERPL-MCNC: 121 MG/DL (ref 65–99)
HBA1C MFR BLD: 7.1 % (ref 4.2–6.3)
HDLC SERPL-MCNC: 50 MG/DL (ref 40–60)
LDLC SERPL CALC-MCNC: 92 MG/DL (ref 0–100)
MICROALBUMIN UR-MCNC: 8.1 MG/L (ref 0–20)
MICROALBUMIN/CREAT 24H UR: 7 MG/G CREATININE (ref 0–30)
POTASSIUM SERPL-SCNC: 4.4 MMOL/L (ref 3.5–5.3)
PROT SERPL-MCNC: 6.9 G/DL (ref 6.4–8.2)
SODIUM SERPL-SCNC: 136 MMOL/L (ref 136–145)
TRIGL SERPL-MCNC: 136 MG/DL
TSH SERPL DL<=0.05 MIU/L-ACNC: 2.18 UIU/ML (ref 0.36–3.74)

## 2017-11-20 PROCEDURE — 80053 COMPREHEN METABOLIC PANEL: CPT

## 2017-11-20 PROCEDURE — 84443 ASSAY THYROID STIM HORMONE: CPT

## 2017-11-20 PROCEDURE — 82043 UR ALBUMIN QUANTITATIVE: CPT

## 2017-11-20 PROCEDURE — 82570 ASSAY OF URINE CREATININE: CPT

## 2017-11-20 PROCEDURE — 83036 HEMOGLOBIN GLYCOSYLATED A1C: CPT

## 2017-11-20 PROCEDURE — 80061 LIPID PANEL: CPT

## 2017-11-20 PROCEDURE — 36415 COLL VENOUS BLD VENIPUNCTURE: CPT

## 2017-11-20 PROCEDURE — 73110 X-RAY EXAM OF WRIST: CPT

## 2017-11-30 ENCOUNTER — GENERIC CONVERSION - ENCOUNTER (OUTPATIENT)
Dept: OTHER | Facility: OTHER | Age: 72
End: 2017-11-30

## 2017-11-30 DIAGNOSIS — S52.501A CLOSED FRACTURE OF LOWER END OF RIGHT RADIUS: ICD-10-CM

## 2017-12-18 ENCOUNTER — ALLSCRIPTS OFFICE VISIT (OUTPATIENT)
Dept: OTHER | Facility: OTHER | Age: 72
End: 2017-12-18

## 2018-01-10 NOTE — MISCELLANEOUS
Message   Recorded as Task   Date: 12/06/2016 11:46 AM, Created By: Sam Barkley   Task Name: Follow Up   Assigned To: Sanaz Mendez   Regarding Patient: Brenda Thornton, Status: Active   CommentDavidner Italia - 06 Dec 2016 11:46 AM     TASK CREATED  saw pt at stim talk   she was c/o worsening low back pain   would like to order CT lumbar spine without contrast (order in allscripts)   Justina Karimi - 06 Dec 2016 2:33 PM     TASK EDITED  Pt advised that Dr Herman Victoria is going to order a CT of her Lumbar Spine wo contrast  Pt told that our office will contact her Ins to inquire if Dylon Alexander is needed and then our office will contact here once auth obtained  Pt told then she can call central schedule # which is on the script to schedule the CT  Pt told I will mail out her CT script to her home address  Pt verbalized understanding  CT Script mailed to pt's home address  Active Problems    1  Abdominal discomfort (789 00) (R10 9)   2  Acute colitis (558 9) (K52 9)   3  Acute diverticulitis (562 11) (K57 92)   4  Acute duodenal ulcer (532 30) (K26 3)   5  Acute gastritis (535 00) (K29 00)   6  Acute sinusitis (461 9) (J01 90)   7  Acute upper respiratory infection (465 9) (J06 9)   8  Allergic rhinitis (477 9) (J30 9)   9  Analgesic use (V58 69) (Z79 899)   10  Anxiety (300 00) (F41 9)   11  Arthritis (716 90) (M19 90)   12  Bloating (787 3) (R14 0)   13  Change in bowel habits (787 99) (R19 4)   14  Chronic pain syndrome (338 4) (G89 4)   15  Cystocele, midline (618 01) (N81 11)   16  Diabetes mellitus, type 2 (250 00) (E11 9)   17  Disc degeneration, lumbar (722 52) (M51 36)   18  Dyspepsia (536 8) (K30)   19  Encounter for gynecological examination with abnormal finding (V72 31) (Z01 411)   20  Encounter for routine gynecological examination (V72 31) (Z01 419)   21  Fibromyalgia (729 1) (M79 7)   22  Flank pain (789 09) (R10 9)   23  Generalized abdominal pain (789 07) (R10 84)   24  Hematuria (599 70) (R31 9)   25  Hernia (553 9) (K46 9)   26  High cholesterol (272 0) (E78 00)   27  Hip Pain Elicited By Motion   28  History of colon polyps (V12 72) (Z86 010)   29  Hyperglycemia (790 29) (R73 9)   30  Hypertension (401 9) (I10)   31  Influenza vaccine needed (V04 81) (Z23)   32  Irritable bowel syndrome (564 1) (K58 9)   33  Lumbar radiculopathy (724 4) (M54 16)   34  Mammogram abnormal (793 80) (R92 8)   35  Medicare annual wellness visit, initial (V70 0) (Z00 00)   36  Myofascial pain syndrome (729 1) (M79 1)   37  Need for pneumococcal vaccination (V03 82) (Z23)   38  Nephrolithiasis (592 0) (N20 0)   39  Overactive bladder (596 51) (N32 81)   40  Postlaminectomy syndrome, lumbar (722 83) (M96 1)   41  PPD screening test (V74 1) (Z11 1)   42  Screening for diabetic retinopathy (V80 2) (Z13 5)   43  Tuberculosis screening (V74 1) (Z11 1)   44  Urinary incontinence (788 30) (R32)   45  Urinary tract infection (599 0) (N39 0)   46  Vaginal wall prolapse (618 00) (N81 10)   47  Vaginal yeast infection (112 1) (B37 3)   48  Vertigo (780 4) (R42)   49  Visit for screening mammogram (V76 12) (Z12 31)    Current Meds   1  Ciprofloxacin HCl - 500 MG Oral Tablet; TAKE 1 TABLET Every twelve hours; Therapy: 12CKY7158 to (Complete:73Koq3600)  Requested for: 22Nov2016; Last   Rx:22Nov2016 Ordered   2  Dexilant 60 MG Oral Capsule Delayed Release; Therapy: 49IJF4794 to Recorded   3  Dicyclomine HCl - 20 MG Oral Tablet; TAKE 1 TABLET Every 6 hours PRN SPASMS; Therapy: 64Ifh2662 to (Evaluate:29Nov2016)  Requested for: 44UTO7295; Last   Rx:20Oct2016 Ordered   4  Edarbi 40 MG Oral Tablet; Take 1 tablet daily Recorded   5  FentaNYL 25 MCG/HR Transdermal Patch 72 Hour; APPLY 1 PATCH EVERY 3 DAYS; Therapy: 54CBN0319 to (Evaluate:89Ovx4302); Last Rx:27Jun2016 Ordered   6  Fluticasone Propionate 50 MCG/ACT Nasal Suspension; USE 2 SPRAYS IN EACH   NOSTRIL ONCE DAILY;    Therapy: 59Zdg0630 to (Last Bettie Torre) Requested for: 96Lvs6492 Ordered   7  Livalo 2 MG Oral Tablet; Therapy: 80FLO6309 to Recorded   8  Loratadine TABS; Therapy: (Recorded:68Tbq4895) to Recorded   9  Lyrica 50 MG Oral Capsule; Therapy: 03ENW3564 to Recorded   10  Meloxicam 15 MG Oral Tablet; TAKE 1 TABLET DAILY WITH FOOD; Therapy: 83QWD4384 to (436 2743)  Requested for: 66AEP3646; Last    Rx:16Nlk9286 Ordered   11  Mucinex D  MG Oral Tablet Extended Release 12 Hour; Therapy: (Recorded:48Oct2844) to Recorded   12  Oxycodone-Acetaminophen  MG Oral Tablet (Percocet); TAKE 1 TABLET Every 6    hours PRN; Therapy: 81UGX2727 to (Last Rx:06Rii0235)  Requested for: 07DLZ9248 Ordered   13  Pantoprazole Sodium 40 MG Oral Tablet Delayed Release (Protonix); TAKE 1 TABLET    TWICE DAILY 30 MINUTES BEFORE BREAKFAST AND DINNER; Therapy: 67OGT5284 to ([facility])  Requested for: 11CUK0316; Last    Rx:02Mar2016 Ordered   14  Savella 25 MG Oral Tablet; Therapy: 49HBI4666 to Recorded   15  Sucralfate 1 GM Oral Tablet; take 1 tablet by mouth four times a day; Therapy: 55YVX5169 to (Evaluate:25Oct2016); Last Rx:27Jun2016 Ordered   16  Tolterodine Tartrate ER 4 MG Oral Capsule Extended Release 24 Hour; TAKE ONE    CAPSULE BY MOUTH ONCE DAILY; Therapy: 67Ltz6197 to (Renew:31Jan2017); Last Rx:38Dqs2324 Ordered   17  Uribel CAPS Recorded    Allergies    1  Aspartame (Nutrasweet) POWD   2  Contrast Media Ready-Box MISC   3  Penicillins   4   Sulfa Drugs    Signatures   Electronically signed by : Ethan Crespo, ; Dec  6 2016  2:33PM EST                       (Author)

## 2018-01-10 NOTE — MISCELLANEOUS
Message  Return to work or school:  9/22/17   She is able to return to work on  9/26/17      The patient was under physician's care from 9/22/17-9/26/17  She can return to work with no restrictions on 9/25/17          Signatures   Electronically signed by : Malini Fischer DO; Sep 25 2017  7:49AM EST                       (Author)

## 2018-01-11 NOTE — MISCELLANEOUS
Message   Recorded as Task   Date: 01/10/2017 11:44 AM, Created By: Villa Cowden   Task Name: Follow Up   Assigned To: Dorothy Plummer   Regarding Patient: Troy Baker, Status: Active   Comment:    Villa Cowden - 10 Daniel 2017 11:44 AM     TASK CREATED  left VM to go over CT scan results   Justina Karimi - 10 Daniel 2017 1:22 PM     TASK EDITED  Pt returned FQ call at 1:19  Told pt that Dr Carter Antonio is doing procedure this afternoon but I would let him know that she returned his call  Pt's best contact # is her cell at [phone] and she is avail the rest of the day  Bassem Granados - 10 Daniel 2017 1:41 PM     TASK REPLIED TO: Previously Assigned To Villa Cowden  discussed CT scan findings   discussed performing bilateral L3 TF EMILIANO for formainal stenosis at that level    please schedule (Dx M51 16)   Justina Karimi - 11 Jan 2017 9:34 AM     TASK EDITED  Left vm on home and cell for pt to c/b the nurse to schedule an inj  Justina Karimi - 11 Jan 2017 9:35 AM     TASK IN PROGRESS   Justina Karimi - 11 Jan 2017 9:58 AM     TASK EDITED  Pt scheduled for B/L L3 TFESI for 1/26/17 at 9:30 at [facility]  Instr reviewed: light breakfast then NPO 1 Hr prior to opro,  needed, denies blood thinners, c/b needed if sick/abx started prior to opro  Pt verbalized understanding of instr  Pt said she forgot to ask Dr Carter Antonio when he called her but she attended the seminar for the new version of the stimulator that you don't feel the stimulation and she weanted to know if she would be a candidate for it? She said a couple of months ago [employer] or [address] [place] [address] changed the setting to help with her leg pain but for it to be effective she has to have it turned up so high and that strong sensation bothers her  Told pt I would forward her question to Dr Carter Antonio  I saw in allscripts that she had [employer]  Please advise     Villa Cowden - 11 Jan 2017 10:59 AM     TASK REPLIED TO: Previously Assigned To Alonso Navarro  she has a [employer] stimulator that has to be switched to Jostin Reyes I'm waiting to here back from Dr Philippe Richard on that   Deidra Lean - 11 Jan 2017 11:19 AM     TASK EDITED  Left detailed vm on pt's home number advising of the same  Active Problems    1  Abdominal discomfort (789 00) (R10 9)   2  Acute colitis (558 9) (K52 9)   3  Acute diverticulitis (562 11) (K57 92)   4  Acute duodenal ulcer (532 30) (K26 3)   5  Acute gastritis (535 00) (K29 00)   6  Acute sinusitis (461 9) (J01 90)   7  Acute upper respiratory infection (465 9) (J06 9)   8  Allergic rhinitis (477 9) (J30 9)   9  Analgesic use (V58 69) (Z79 899)   10  Anxiety (300 00) (F41 9)   11  Arthritis (716 90) (M19 90)   12  Bloating (787 3) (R14 0)   13  Change in bowel habits (787 99) (R19 4)   14  Chronic pain syndrome (338 4) (G89 4)   15  Cystocele, midline (618 01) (N81 11)   16  Diabetes mellitus, type 2 (250 00) (E11 9)   17  Disc degeneration, lumbar (722 52) (M51 36)   18  Dyspepsia (536 8) (K30)   19  Encounter for gynecological examination with abnormal finding (V72 31) (Z01 411)   20  Encounter for routine gynecological examination (V72 31) (Z01 419)   21  Fibromyalgia (729 1) (M79 7)   22  Flank pain (789 09) (R10 9)   23  Generalized abdominal pain (789 07) (R10 84)   24  Glaucoma (365 9) (H40 9)   25  Hematuria (599 70) (R31 9)   26  Hernia (553 9) (K46 9)   27  High cholesterol (272 0) (E78 00)   28  Hip Pain Elicited By Motion   29  History of colon polyps (V12 72) (Z86 010)   30  Hyperglycemia (790 29) (R73 9)   31  Hypertension (401 9) (I10)   32  Influenza vaccine needed (V04 81) (Z23)   33  Irritable bowel syndrome (564 1) (K58 9)   34  Lumbar radiculopathy (724 4) (M54 16)   35  Mammogram abnormal (793 80) (R92 8)   36  Medicare annual wellness visit, initial (V70 0) (Z00 00)   37  Myofascial pain syndrome (729 1) (M79 1)   38  Need for pneumococcal vaccination (V03 82) (Z23)   39   Nephrolithiasis (592 0) (N20 0)   40  Overactive bladder (596 51) (N32 81)   41  Postlaminectomy syndrome, lumbar (722 83) (M96 1)   42  PPD screening test (V74 1) (Z11 1)   43  Screening for diabetic retinopathy (V80 2) (Z13 5)   44  Tuberculosis screening (V74 1) (Z11 1)   45  Urinary incontinence (788 30) (R32)   46  Urinary tract infection (599 0) (N39 0)   47  Vaginal wall prolapse (618 00) (N81 10)   48  Vaginal yeast infection (112 1) (B37 3)   49  Vertigo (780 4) (R42)   50  Visit for screening mammogram (V76 12) (Z12 31)    Current Meds   1  Dicyclomine HCl - 20 MG Oral Tablet; TAKE 1 TABLET Every 6 hours PRN SPASMS; Therapy: 80Qki9853 to (Evaluate:29Nov2016)  Requested for: 40OPU8799; Last   Rx:10Bjz2516 Ordered   2  Edarbi 40 MG Oral Tablet; Take 1 tablet daily Recorded   3  FentaNYL 25 MCG/HR Transdermal Patch 72 Hour; APPLY 1 PATCH EVERY 3 DAYS; Therapy: 08BOB6977 to (Evaluate:31Rcv7568); Last Rx:21Bzw1049 Ordered   4  Latanoprost 0 005 % Ophthalmic Solution; INSTILL 1 DROP IN BOTH EYES AT   BEDTIME; Therapy: 73AQU4184 to (Zandra Meals)  Requested for: 35DEQ9124; Last   Rx:06Jan2017 Ordered   5  Livalo 2 MG Oral Tablet; Therapy: 39HOL9802 to Recorded   6  Lyrica 50 MG Oral Capsule; Therapy: 91LMJ6341 to Recorded   7  Nitrofurantoin Macrocrystal 50 MG Oral Capsule; TAKE 1 CAPSULE AT BEDTIME; Therapy: 55MUM1398 to (Evaluate:72Kmu7942); Last Rx:67Ymh5938 Ordered   8  Oxycodone-Acetaminophen  MG Oral Tablet (Percocet); TAKE 1 TABLET Every 6   hours PRN; Therapy: 52CXY4375 to (Last OY:34PCB6432)  Requested for: 01IIB9696 Ordered   9  Pantoprazole Sodium 40 MG Oral Tablet Delayed Release (Protonix); TAKE 1 TABLET   TWICE DAILY 30 MINUTES BEFORE BREAKFAST AND DINNER; Therapy: 18EAK7983 to (Renew:91Rcr4643)  Requested for: 75FCR1997; Last   Rx:02Mar2016 Ordered   10  Savella 25 MG Oral Tablet; Therapy: 46QXO5533 to Recorded   11   Sucralfate 1 GM Oral Tablet; take 1 tablet by mouth four times a day; Therapy: 87PRX5286 to (Evaluate:25Oct2016); Last Rx:27Jun2016 Ordered   12  Tolterodine Tartrate ER 4 MG Oral Capsule Extended Release 24 Hour; TAKE ONE    CAPSULE BY MOUTH ONCE DAILY; Therapy: 42Pxg0469 to (Renew:31Jan2017); Last Rx:67Uqx7977 Ordered   13  Uribel CAPS Recorded    Allergies    1  Aspartame (Nutrasweet) POWD   2  Contrast Media Ready-Box MISC   3  Penicillins   4   Sulfa Drugs    Signatures   Electronically signed by : Lolly Oliveros, ; Jan 11 2017 11:20AM EST                       (Author)

## 2018-01-11 NOTE — RESULT NOTES
Message    Gastric biopsies are negative for H  pylori  Colon biopsies are benign and negative for colitis  1    Left a message for patient's daughter   Melina Villarreal, and spoke to patient  Continue Dexilant and Carafate  Advised to avoid Excedrin and mobic  1    Repeat EGD in 3 months  Office visit in one   month***      f/u apt already scheduled for 4/13/2016  thanks CF1       1 Amended By: Maryjo Rice; Mar 08 2016 9:54 AM EST    Verified Results  (1) TISSUE EXAM [id number] 08:39AM Rolin Levo     Test Name Result Flag Reference   LAB AP CASE REPORT (Report)     Surgical Pathology Report             Case: [id number]                   Authorizing Provider: Jeimy Garcia MD     Collected:      03/02/2016 0839        Ordering Location:   [facility]    Received:      03/02/2016 [address] [place] [address] Endoscopy                               Pathologist:      Romeo Espino MD                                 Specimens:  A) - Stomach, Gastric body, cold BX                                  B) - Duodenum, Duodenum, cold BX                                    C) - Colon, Random colon, cold BX   LAB AP FINAL DIAGNOSIS (Report)     A  Gastric body (biopsy):    - Chronic inactive gastritis involving body mucosa  - Immunostain for H  pylori (with appropriate positive control) is   negative  - No intestinal metaplasia, dysplasia or neoplasia identified  B  Duodenum (biopsy):    - Small bowel mucosa with no significant pathologic abnormalities  - No villous atrophy, increased intraepithelial lymphocytes or crypt   hyperplasia to suggest     malabsorptive enteropathy     - No active inflammation, granulomas, organisms, dysplasia or neoplasia   identified  C  Colon (random biopsies):    - Colonic mucosa with no significant pathologic abnormalities  - No active inflammation or histologic evidence of a microscopic   (lymphocytic)     or collagenous colitis noted      - No granulomas, dysplasia or neoplasia identified  LAB AP NOTE      Intradepartmental consultation concurs with the diagnosis  LAB AP SURGICAL ADDITIONAL INFORMATION (Report)     These tests were developed and their performance characteristics   determined by [address] [place] [address] or [facility]  They may not be cleared or approved by the [place]  Food and   Drug Administration  The FDA has determined that such clearance or   approval is not necessary  These tests are used for clinical purposes  They should not be regarded as investigational or for research  This   laboratory has been approved by [place] [address], designated as a high-complexity   laboratory and is qualified to perform these tests  LAB AP GROSS DESCRIPTION (Report)     A  The specimen is received in formalin, labeled with the patient's name   and hospital number, and is designated gastric body biopsy  The   specimen consists of 2 tan soft tissue fragments measuring 0 2 and 0 4 cm  Entirely submitted  One cassette  B  The specimen is received in formalin, labeled with the patient's name   and hospital number, and is designated duodenum biopsy  The specimen   consists of 4 tan soft tissue fragments each measuring 0 2-0 4 cm  Entirely submitted  One cassette  Note: The estimated total formalin fixation time based upon information   provided by the submitting clinician and the standard processing schedule   is 29 25 hours  C  The specimen is received in formalin, labeled with the patient's name   and hospital number, and is designated random colon biopsy  The   specimen consists of multiple tan soft tissue fragments measuring in   aggregate 0 7 x 0 6 x 0 1 cm  Entirely submitted  One cassette  Note: The estimated total formalin fixation time based upon information   provided by the submitting clinician and the standard processing schedule   is 29 0 hours  MAC   LAB AP CLINICAL INFORMATION      Dyspepsia    History of colonic polyps  ï¾   Change in bowel habits  ï¾   Abdominal discomfort

## 2018-01-11 NOTE — MISCELLANEOUS
Message   Recorded as Task   Date: 06/24/2016 03:43 PM, Created By: Sebas Lujan   Task Name: Follow Up   Assigned To: Brittney Robles   Regarding Patient: Nanci Sams, Status: Active   CommentSundeep Sheehan - 24 Jun 2016 3:43 PM     TASK CREATED  Can you contact Zo Corcoran and have her reprogram pt's SCS to offer HD   thanks   Marly Rucker - 27 Jun 2016 9:02 AM     TASK EDITED  Email sent to Zo Corcoran from [employer] to contact pt for re programming  Sebas Lujan - 27 Jun 2016 10:39 AM     TASK REPLIED TO: Previously Assigned To Sebas Lujan  thanks! Active Problems    1  Abdominal discomfort (789 00) (R10 9)   2  Acute colitis (558 9) (K52 9)   3  Acute diverticulitis (562 11) (K57 92)   4  Acute duodenal ulcer (532 30) (K26 3)   5  Acute gastritis (535 00) (K29 00)   6  Acute sinusitis (461 9) (J01 90)   7  Acute upper respiratory infection (465 9) (J06 9)   8  Allergic rhinitis (477 9) (J30 9)   9  Analgesic use (V58 69) (Z79 899)   10  Anxiety (300 00) (F41 9)   11  Arthritis (716 90) (M19 90)   12  Bloating (787 3) (R14 0)   13  Change in bowel habits (787 99) (R19 4)   14  Chronic pain syndrome (338 4) (G89 4)   15  Cystocele, midline (618 01) (N81 11)   16  Diabetes mellitus, type 2 (250 00) (E11 9)   17  Disc degeneration, lumbar (722 52) (M51 36)   18  Dyspepsia (536 8) (K30)   19  Encounter for gynecological examination with abnormal finding (V72 31) (Z01 411)   20  Encounter for routine gynecological examination (V72 31) (Z01 419)   21  Fibromyalgia (729 1) (M79 7)   22  Flank pain (789 09) (R10 9)   23  Generalized abdominal pain (789 07) (R10 84)   24  Hematuria (599 70) (R31 9)   25  Hernia (553 9) (K46 9)   26  High cholesterol (272 0) (E78 0)   27  Hip Pain Elicited By Motion   28  History of colon polyps (V12 72) (Z86 010)   29  Hyperglycemia (790 29) (R73 9)   30  Hypertension (401 9) (I10)   31  Influenza vaccine needed (V04 81) (Z23)   32   Irritable bowel syndrome (564 1) (K58 9)   33  Lumbar radiculopathy (724 4) (M54 16)   34  Mammogram abnormal (793 80) (R92 8)   35  Medicare annual wellness visit, initial (V70 0) (Z00 00)   36  Myofascial pain syndrome (729 1) (M79 1)   37  Need for pneumococcal vaccination (V03 82) (Z23)   38  Nephrolithiasis (592 0) (N20 0)   39  Postlaminectomy syndrome, lumbar (722 83) (M96 1)   40  PPD screening test (V74 1) (Z11 1)   41  Screening for diabetic retinopathy (V80 2) (Z13 5)   42  Tuberculosis screening (V74 1) (Z11 1)   43  Urinary incontinence (788 30) (R32)   44  Urinary tract infection (599 0) (N39 0)   45  Vaginal wall prolapse (618 00) (N81 10)   46  Vaginal yeast infection (112 1) (B37 3)   47  Vertigo (780 4) (R42)   48  Visit for screening mammogram (V76 12) (Z12 31)    Current Meds   1  Cyclobenzaprine HCl - 10 MG Oral Tablet; Take 1 tablet 3 times daily as needed; Therapy: 03JQX5012 to (Cameron Tong)  Requested for: 44JSZ6121; Last   BK:28GUI1234 Ordered   2  Dexilant 60 MG Oral Capsule Delayed Release; Therapy: 76TFV5890 to Recorded   3  Dicyclomine HCl - 20 MG Oral Tablet; TAKE 1 TABLET Every 6 hours PRN SPASMS; Therapy: 74Qdj6010 to (Tripp Mccall)  Requested for: 68Kkq5738; Last   Rx:11Bac0998 Ordered   4  Edarbi 40 MG Oral Tablet; Take 1 tablet daily Recorded   5  FentaNYL 25 MCG/HR Transdermal Patch 72 Hour; APPLY 1 PATCH EVERY 3 DAYS; Therapy: 34JTP3255 to (Evaluate:18Imu7839); Last Rx:84Jts8685 Ordered   6  Fluticasone Propionate 50 MCG/ACT Nasal Suspension; USE 2 SPRAYS IN EACH   NOSTRIL ONCE DAILY; Therapy: 83Mlf8909 to (Last Aneita Mortimer)  Requested for: 12Rre5360 Ordered   7  Livalo 2 MG Oral Tablet; Therapy: 65KNG5074 to Recorded   8  Loratadine TABS; Therapy: (Recorded:14Zoy1223) to Recorded   9  Lyrica 50 MG Oral Capsule; Therapy: 23QFE1398 to Recorded   10  Meloxicam 15 MG Oral Tablet; TAKE 1 TABLET DAILY WITH FOOD;     Therapy: 13BYE1892 to (Evaluate:46Bju8616)  Requested for: 43JSS9361; Last    Rx:31Mtq9791 Ordered   11  Mucinex D  MG Oral Tablet Extended Release 12 Hour; Therapy: (Recorded:34Tsz5443) to Recorded   12  Oxycodone-Acetaminophen  MG Oral Tablet (Percocet); TAKE 1 TAB EVERY 8    HOURS AS NEEDED FOR PAIN;    Therapy: 46ZNB9318 to (Evaluate:90Qas1916)  Requested for: 39HJQ9792; Last    Rx:20Jan2015 Ordered   13  Pantoprazole Sodium 40 MG Oral Tablet Delayed Release (Protonix); TAKE 1 TABLET    TWICE DAILY 30 MINUTES BEFORE BREAKFAST AND DINNER; Therapy: 38SUG0014 to (Deborah Lynn)  Requested for: 86BTC2035; Last    Rx:02Mar2016 Ordered   14  Savella 25 MG Oral Tablet; Therapy: 80XMH2496 to Recorded   15  Sucralfate 1 GM Oral Tablet; take 1 tablet by mouth four times a day; Therapy: 03ZQX2236 to (Evaluate:25Oct2016); Last Rx:27Jun2016 Ordered   16  Uribel CAPS Recorded    Allergies    1  Aspartame (Nutrasweet) POWD   2  Contrast Media Ready-Box MISC   3  Penicillins   4   Sulfa Drugs    Signatures   Electronically signed by : Lillian Selby, ; Jun 27 2016 11:26AM EST                       (Author)

## 2018-01-11 NOTE — MISCELLANEOUS
Message  Return to work or school:  9/22/17 9/25/17            Signatures   Electronically signed by : Alexys Lopez DO; Sep 22 2017  4:41PM EST                       (Author)

## 2018-01-13 VITALS
DIASTOLIC BLOOD PRESSURE: 73 MMHG | WEIGHT: 177.38 LBS | BODY MASS INDEX: 33.49 KG/M2 | SYSTOLIC BLOOD PRESSURE: 144 MMHG | HEART RATE: 108 BPM | HEIGHT: 61 IN

## 2018-01-13 VITALS
SYSTOLIC BLOOD PRESSURE: 150 MMHG | WEIGHT: 187 LBS | TEMPERATURE: 97.4 F | BODY MASS INDEX: 35.3 KG/M2 | DIASTOLIC BLOOD PRESSURE: 86 MMHG | HEART RATE: 102 BPM | HEIGHT: 61 IN | RESPIRATION RATE: 18 BRPM

## 2018-01-13 NOTE — MISCELLANEOUS
Message  Return to work or school:   Geneva Segura is under my professional care  She was seen in my office on 9/22/17    She is not able to return to work until 9/26/17     The patient was under physician's care from 9/22/17-9/26/17 and may return to work on 9/26/19 with no restrictions          Signatures   Electronically signed by : Matthias Abad DO; Sep 25 2017  7:46AM EST                       (Author)    Electronically signed by : Matthias Abad DO; Sep 25 2017  7:47AM EST                       (Author)    Electronically signed by : Matthias Abad DO; Sep 25 2017  7:51AM EST                       (Author)

## 2018-01-14 VITALS
WEIGHT: 179 LBS | BODY MASS INDEX: 33.79 KG/M2 | SYSTOLIC BLOOD PRESSURE: 118 MMHG | HEIGHT: 61 IN | DIASTOLIC BLOOD PRESSURE: 56 MMHG

## 2018-01-16 NOTE — MISCELLANEOUS
Message  Return to work or school:   Katerin Mccoy is under my professional care   She was seen in my office on 9/27/17   She is able to return to work on  9/28/17            Signatures   Electronically signed by : Risa Latham DO; Sep 27 2017  7:59AM EST                       (Author)

## 2018-01-17 NOTE — MISCELLANEOUS
Message   Recorded as Task   Date: 02/01/2017 03:33 PM, Created By: Gerard Roberts   Task Name: Follow Up   Assigned To: Carmen Mitchell procedure,Team   Regarding Patient: Trista Cleveland, Status: In Progress   CommentTetiana Aragona - 01 Feb 2017 3:33 PM     TASK CREATED  pt is S/P B/L L3 TFESI 01/26/2017 by Dr James Ruht   no pain diary   no f/u   Gerard Roberts - 02 Feb 2017 9:45 AM     TASK EDITED  1st attempt lm for pt on cell and home to f/u with office   Gerardjames Roberts - 02 Feb 2017 9:45 AM     TASK IN PROGRESS   Gerard Roberts - 03 Feb 2017 2:36 PM     TASK EDITED  spoke to pt she got 60% relief from inj her pain is a 4 no redness or swelling   Kellen Reynoso - 03 Feb 2017 3:45 PM     TASK REPLIED TO: Previously Assigned To Kellen Reynoso  md aware   pt may f/u PRN        Active Problems    1  Abdominal discomfort (789 00) (R10 9)   2  Acute colitis (558 9) (K52 9)   3  Acute diverticulitis (562 11) (K57 92)   4  Acute duodenal ulcer (532 30) (K26 3)   5  Acute gastritis (535 00) (K29 00)   6  Acute sinusitis (461 9) (J01 90)   7  Acute upper respiratory infection (465 9) (J06 9)   8  Allergic rhinitis (477 9) (J30 9)   9  Analgesic use (V58 69) (Z79 899)   10  Anxiety (300 00) (F41 9)   11  Arthritis (716 90) (M19 90)   12  Bloating (787 3) (R14 0)   13  Change in bowel habits (787 99) (R19 4)   14  Chronic pain syndrome (338 4) (G89 4)   15  Cystocele, midline (618 01) (N81 11)   16  Degenerative lumbar spinal stenosis (724 02) (M48 06)   17  Diabetes mellitus, type 2 (250 00) (E11 9)   18  Disc degeneration, lumbar (722 52) (M51 36)   19  Dyspepsia (536 8) (K30)   20  Encounter for gynecological examination with abnormal finding (V72 31) (Z01 411)   21  Encounter for routine gynecological examination (V72 31) (Z01 419)   22  Fibromyalgia (729 1) (M79 7)   23  Flank pain (789 09) (R10 9)   24  Generalized abdominal pain (789 07) (R10 84)   25  Glaucoma (365 9) (H40 9)   26   Hematuria (599 70) (R31 9)   27  Hernia (553 9) (K46 9)   28  High cholesterol (272 0) (E78 00)   29  Hip Pain Elicited By Motion   30  History of colon polyps (V12 72) (Z86 010)   31  Hyperglycemia (790 29) (R73 9)   32  Hypertension (401 9) (I10)   33  Influenza vaccine needed (V04 81) (Z23)   34  Irritable bowel syndrome (564 1) (K58 9)   35  Lumbar radiculopathy (724 4) (M54 16)   36  Mammogram abnormal (793 80) (R92 8)   37  Medicare annual wellness visit, initial (V70 0) (Z00 00)   38  Myofascial pain syndrome (729 1) (M79 1)   39  Need for pneumococcal vaccination (V03 82) (Z23)   40  Nephrolithiasis (592 0) (N20 0)   41  Overactive bladder (596 51) (N32 81)   42  Postlaminectomy syndrome, lumbar (722 83) (M96 1)   43  PPD screening test (V74 1) (Z11 1)   44  Screening for diabetic retinopathy (V80 2) (Z13 5)   45  Tuberculosis screening (V74 1) (Z11 1)   46  Urinary incontinence (788 30) (R32)   47  Urinary tract infection (599 0) (N39 0)   48  Vaginal wall prolapse (618 00) (N81 10)   49  Vaginal yeast infection (112 1) (B37 3)   50  Vertigo (780 4) (R42)   51  Visit for screening mammogram (V76 12) (Z12 31)    Current Meds   1  Dicyclomine HCl - 20 MG Oral Tablet; TAKE 1 TABLET Every 6 hours PRN SPASMS; Therapy: 48Vqo4760 to (Evaluate:29Nov2016)  Requested for: 23UOZ2554; Last   Rx:68Yvo0643 Ordered   2  Edarbi 40 MG Oral Tablet; Take 1 tablet daily Recorded   3  FentaNYL 25 MCG/HR Transdermal Patch 72 Hour; APPLY 1 PATCH EVERY 3 DAYS; Therapy: 04YCM3991 to (Evaluate:72Jrq7849); Last Rx:74Uxb2476 Ordered   4  Latanoprost 0 005 % Ophthalmic Solution; INSTILL 1 DROP IN BOTH EYES AT   BEDTIME; Therapy: 63MTX7690 to (Daphnie Shaker)  Requested for: 39ZZE1633; Last   Rx:06Jan2017 Ordered   5  Livalo 2 MG Oral Tablet; Therapy: 53BAZ9944 to Recorded   6  Lyrica 50 MG Oral Capsule; Therapy: 88NFZ9164 to Recorded   7  Nitrofurantoin Macrocrystal 50 MG Oral Capsule; TAKE 1 CAPSULE AT BEDTIME;    Therapy: 13CKE4694 to (Evaluate:16Apr2017); Last Rx:40Pzy7930 Ordered   8  Oxycodone-Acetaminophen  MG Oral Tablet (Percocet); TAKE 1 TABLET Every 6   hours PRN; Therapy: 28VII0189 to (Last OT:45QOJ6804)  Requested for: 69KNC4801 Ordered   9  Pantoprazole Sodium 40 MG Oral Tablet Delayed Release (Protonix); TAKE 1 TABLET   TWICE DAILY 30 MINUTES BEFORE BREAKFAST AND DINNER; Therapy: 95LLJ0952 to (Renew:64Luf6870)  Requested for: 30TYG0923; Last   Rx:02Mar2016 Ordered   10  Savella 25 MG Oral Tablet; Therapy: 15JNM3683 to Recorded   11  Sucralfate 1 GM Oral Tablet; take 1 tablet by mouth four times a day; Therapy: 17UTV6419 to (Evaluate:25Oct2016); Last Rx:27Jun2016 Ordered   12  Tolterodine Tartrate ER 4 MG Oral Capsule Extended Release 24 Hour; TAKE ONE    CAPSULE BY MOUTH ONCE DAILY; Therapy: 48Ino5243 to (Renew:31Jan2017); Last Rx:18Rdc9417 Ordered   13  Uribel CAPS Recorded    Allergies    1  Aspartame (Nutrasweet) POWD   2  Contrast Media Ready-Box MISC   3  Penicillins   4   Sulfa Drugs    Signatures   Electronically signed by : Neris Macias, ; Feb 6 2017 10:10AM EST                       (Author)

## 2018-01-17 NOTE — MISCELLANEOUS
Message  Return to work or school:   Janett Richards is under my professional care  She was seen in my office on 08/29/2017   She is able to return to work on  08/30/2017       Estelita Lacey DO        Signatures   Electronically signed by : Estelita Lacey DO; Aug 29 2017 12:21PM EST                       (Author)

## 2018-01-17 NOTE — MISCELLANEOUS
Message  Message Free Text Note Form: radiologist called with STAT result , mild diverticulitis,   i called the pt and spoke to her , she has loose bowel but symptoms better than day before,   she wanted me to talk to her daughter , dr Gimenez , she says she will take care of of her [place] [id number]      Signatures   Electronically signed by : FLO Brar ; Sep 26 2017  8:10AM EST                       (Author)

## 2018-01-22 VITALS
HEART RATE: 102 BPM | SYSTOLIC BLOOD PRESSURE: 120 MMHG | BODY MASS INDEX: 3.28 KG/M2 | HEIGHT: 61 IN | WEIGHT: 17.38 LBS | DIASTOLIC BLOOD PRESSURE: 77 MMHG

## 2018-01-22 VITALS
WEIGHT: 170.38 LBS | BODY MASS INDEX: 32.17 KG/M2 | SYSTOLIC BLOOD PRESSURE: 160 MMHG | HEART RATE: 108 BPM | DIASTOLIC BLOOD PRESSURE: 84 MMHG | HEIGHT: 61 IN

## 2018-01-22 VITALS
SYSTOLIC BLOOD PRESSURE: 123 MMHG | DIASTOLIC BLOOD PRESSURE: 68 MMHG | HEART RATE: 125 BPM | HEIGHT: 61 IN | BODY MASS INDEX: 33.42 KG/M2 | WEIGHT: 177 LBS

## 2018-01-22 VITALS — BODY MASS INDEX: 33.49 KG/M2 | WEIGHT: 177.38 LBS | HEIGHT: 61 IN

## 2018-01-24 VITALS
SYSTOLIC BLOOD PRESSURE: 130 MMHG | DIASTOLIC BLOOD PRESSURE: 75 MMHG | WEIGHT: 171 LBS | BODY MASS INDEX: 32.28 KG/M2 | HEIGHT: 61 IN | HEART RATE: 112 BPM

## 2018-01-24 DIAGNOSIS — K31.84 GASTROPARESIS: Primary | ICD-10-CM

## 2018-01-24 DIAGNOSIS — B37.3 VAGINAL YEAST INFECTION: ICD-10-CM

## 2018-01-24 RX ORDER — METOCLOPRAMIDE 5 MG/1
5 TABLET ORAL 3 TIMES DAILY
Qty: 90 TABLET | Refills: 3 | Status: SHIPPED | OUTPATIENT
Start: 2018-01-24 | End: 2018-02-23

## 2018-01-24 RX ORDER — FLUCONAZOLE 150 MG/1
150 TABLET ORAL ONCE
Qty: 1 TABLET | Refills: 0 | Status: SHIPPED | OUTPATIENT
Start: 2018-01-24 | End: 2018-01-24

## 2018-01-29 ENCOUNTER — TELEPHONE (OUTPATIENT)
Dept: FAMILY MEDICINE CLINIC | Facility: CLINIC | Age: 73
End: 2018-01-29

## 2018-01-29 DIAGNOSIS — M48.062 SPINAL STENOSIS OF LUMBAR REGION WITH NEUROGENIC CLAUDICATION: Primary | ICD-10-CM

## 2018-01-29 DIAGNOSIS — K31.84 GASTROPARESIS: ICD-10-CM

## 2018-01-29 RX ORDER — OXYCODONE AND ACETAMINOPHEN 10; 325 MG/1; MG/1
1 TABLET ORAL EVERY 6 HOURS PRN
Qty: 120 TABLET | Refills: 0 | Status: SHIPPED | OUTPATIENT
Start: 2018-01-29 | End: 2018-02-26 | Stop reason: SDUPTHER

## 2018-01-29 RX ORDER — METOCLOPRAMIDE 5 MG/1
5 TABLET ORAL 3 TIMES DAILY
Qty: 90 TABLET | Refills: 0 | Status: CANCELLED | OUTPATIENT
Start: 2018-01-29 | End: 2018-02-28

## 2018-01-29 RX ORDER — FENTANYL 25 UG/H
1 PATCH TRANSDERMAL
Qty: 10 PATCH | Refills: 0 | Status: SHIPPED | OUTPATIENT
Start: 2018-01-29 | End: 2018-02-12

## 2018-02-07 DIAGNOSIS — H40.9 GLAUCOMA OF BOTH EYES, UNSPECIFIED GLAUCOMA TYPE: ICD-10-CM

## 2018-02-07 DIAGNOSIS — K21.9 GASTROESOPHAGEAL REFLUX DISEASE WITHOUT ESOPHAGITIS: Primary | ICD-10-CM

## 2018-02-07 RX ORDER — PANTOPRAZOLE SODIUM 40 MG/1
40 TABLET, DELAYED RELEASE ORAL
Qty: 60 TABLET | Refills: 3 | Status: SHIPPED | OUTPATIENT
Start: 2018-02-07 | End: 2018-06-25 | Stop reason: SDUPTHER

## 2018-02-07 RX ORDER — LATANOPROST 50 UG/ML
1 SOLUTION/ DROPS OPHTHALMIC
Qty: 2.5 ML | Refills: 0 | Status: SHIPPED | OUTPATIENT
Start: 2018-02-07 | End: 2018-03-03 | Stop reason: SDUPTHER

## 2018-02-12 ENCOUNTER — OFFICE VISIT (OUTPATIENT)
Dept: OBGYN CLINIC | Facility: CLINIC | Age: 73
End: 2018-02-12
Payer: COMMERCIAL

## 2018-02-12 ENCOUNTER — APPOINTMENT (OUTPATIENT)
Dept: RADIOLOGY | Facility: CLINIC | Age: 73
End: 2018-02-12
Payer: COMMERCIAL

## 2018-02-12 VITALS
HEART RATE: 105 BPM | WEIGHT: 175 LBS | DIASTOLIC BLOOD PRESSURE: 71 MMHG | SYSTOLIC BLOOD PRESSURE: 151 MMHG | BODY MASS INDEX: 33.04 KG/M2 | HEIGHT: 61 IN

## 2018-02-12 DIAGNOSIS — M48.062 SPINAL STENOSIS, LUMBAR REGION WITH NEUROGENIC CLAUDICATION: Primary | ICD-10-CM

## 2018-02-12 DIAGNOSIS — R20.2 PARESTHESIAS IN RIGHT HAND: Primary | ICD-10-CM

## 2018-02-12 DIAGNOSIS — M25.531 PAIN IN RIGHT WRIST: ICD-10-CM

## 2018-02-12 PROCEDURE — 73110 X-RAY EXAM OF WRIST: CPT

## 2018-02-12 PROCEDURE — 99213 OFFICE O/P EST LOW 20 MIN: CPT | Performed by: ORTHOPAEDIC SURGERY

## 2018-02-12 RX ORDER — OXYBUTYNIN CHLORIDE 15 MG/1
15 TABLET, EXTENDED RELEASE ORAL 2 TIMES DAILY
COMMUNITY
Start: 2017-03-09 | End: 2018-06-13 | Stop reason: SDUPTHER

## 2018-02-12 RX ORDER — METHENAMINE, SODIUM PHOSPHATE, MONOBASIC, MONOHYDRATE, PHENYL SALICYLATE, METHYLENE BLUE, AND HYOSCYAMINE SULFATE 120; 40.8; 36; 10; .12 MG/1; MG/1; MG/1; MG/1; MG/1
CAPSULE ORAL
COMMUNITY
End: 2018-04-19 | Stop reason: ALTCHOICE

## 2018-02-12 RX ORDER — MELOXICAM 15 MG/1
15 TABLET ORAL DAILY
Refills: 3 | COMMUNITY
Start: 2018-01-05 | End: 2018-05-24 | Stop reason: SDUPTHER

## 2018-02-12 RX ORDER — GABAPENTIN 100 MG/1
100 CAPSULE ORAL
Refills: 3 | COMMUNITY
Start: 2018-01-20 | End: 2018-04-19 | Stop reason: ALTCHOICE

## 2018-02-12 RX ORDER — FENTANYL 50 UG/H
1 PATCH TRANSDERMAL
COMMUNITY
Start: 2014-05-16 | End: 2018-02-26 | Stop reason: SDUPTHER

## 2018-02-12 RX ORDER — MECLIZINE HYDROCHLORIDE 25 MG/1
25 TABLET ORAL EVERY 8 HOURS SCHEDULED
COMMUNITY
Start: 2018-02-07 | End: 2018-06-13 | Stop reason: SDUPTHER

## 2018-02-12 NOTE — PROGRESS NOTES
Patient Name:  Neva Mcnulty  MRN:  [id number]    Assessment & Plan     Right distal radius fracture 10/7/17, right hand numbness and tingling suspicious for carpal tunnel syndrome  Activities as tolerated with regards to the right upper extremity  Transition to home exercise program with regards to therapy  EMG of the right upper extremity to evaluate for carpal tunnel syndrome  Follow-up after EMG      Subjective    68-year-old female returns to the office today for follow-up regarding her Right distal radius fracture 10/7/17  Today she notes overall significant improvement with regards to her right wrist   She denies any pain  She does note mild residual stiffness  She continues to participate in therapy  She does however note persistent numbness and tingling specifically in the thumb index and long finger  It does wake her up at night  She denies fevers or chills    General ROS:  Negative for fever, lethargy/malaise, or night sweats  Objective    /71   Pulse 105   Ht 5' 1" (1 549 m)   Wt 79 4 kg (175 lb)   BMI 33 07 kg/m²     Right wrist:  No gross deformity  Skin intact  Mild soft tissue swelling  No erythema or ecchymosis  No tenderness to palpation distal radius and distal ulna  Wrist range of motion is intact and nearly full without discomfort  Full composite fist formation  Sensation intact but diminished in the median nerve distribution  Sensation intact in the ulnar and radial nerve distribution  Positive Tinel's and Phalen's test about the carpal tunnel  2+ radial pulse  X-rays performed today of the right wrist reveals a fully healed distal radius fracture

## 2018-02-20 ENCOUNTER — HOSPITAL ENCOUNTER (OUTPATIENT)
Dept: CT IMAGING | Facility: HOSPITAL | Age: 73
Discharge: HOME | End: 2018-02-20
Payer: COMMERCIAL

## 2018-02-20 DIAGNOSIS — M48.062 SPINAL STENOSIS, LUMBAR REGION WITH NEUROGENIC CLAUDICATION: ICD-10-CM

## 2018-02-20 PROCEDURE — 72131 CT LUMBAR SPINE W/O DYE: CPT

## 2018-02-23 DIAGNOSIS — M48.062 SPINAL STENOSIS OF LUMBAR REGION WITH NEUROGENIC CLAUDICATION: Primary | ICD-10-CM

## 2018-02-26 DIAGNOSIS — N39.0 URINARY TRACT INFECTION WITH HEMATURIA, SITE UNSPECIFIED: Primary | ICD-10-CM

## 2018-02-26 DIAGNOSIS — M48.061 SPINAL STENOSIS OF LUMBAR REGION, UNSPECIFIED WHETHER NEUROGENIC CLAUDICATION PRESENT: ICD-10-CM

## 2018-02-26 DIAGNOSIS — R31.9 URINARY TRACT INFECTION WITH HEMATURIA, SITE UNSPECIFIED: Primary | ICD-10-CM

## 2018-02-26 DIAGNOSIS — M48.062 SPINAL STENOSIS OF LUMBAR REGION WITH NEUROGENIC CLAUDICATION: ICD-10-CM

## 2018-02-26 DIAGNOSIS — K21.9 GASTROESOPHAGEAL REFLUX DISEASE WITHOUT ESOPHAGITIS: ICD-10-CM

## 2018-02-26 DIAGNOSIS — S62.101S RIGHT WRIST FRACTURE, SEQUELA: ICD-10-CM

## 2018-02-26 RX ORDER — CIPROFLOXACIN 500 MG/1
500 TABLET, FILM COATED ORAL EVERY 12 HOURS SCHEDULED
Qty: 14 TABLET | Refills: 0 | Status: SHIPPED | OUTPATIENT
Start: 2018-02-26 | End: 2018-03-12

## 2018-02-26 RX ORDER — FENTANYL 50 UG/H
1 PATCH TRANSDERMAL
Qty: 5 PATCH | Refills: 0 | Status: SHIPPED | OUTPATIENT
Start: 2018-02-26 | End: 2018-04-30 | Stop reason: SDUPTHER

## 2018-02-26 RX ORDER — OXYCODONE AND ACETAMINOPHEN 10; 325 MG/1; MG/1
1 TABLET ORAL EVERY 6 HOURS PRN
Qty: 120 TABLET | Refills: 0 | Status: SHIPPED | OUTPATIENT
Start: 2018-02-26 | End: 2018-04-30 | Stop reason: SDUPTHER

## 2018-02-28 ENCOUNTER — HOSPITAL ENCOUNTER (OUTPATIENT)
Dept: RADIOLOGY | Facility: CLINIC | Age: 73
Discharge: HOME | End: 2018-02-28
Admitting: PHYSICAL MEDICINE & REHABILITATION
Payer: COMMERCIAL

## 2018-02-28 ENCOUNTER — HOSPITAL ENCOUNTER (OUTPATIENT)
Dept: MAMMOGRAPHY | Facility: HOSPITAL | Age: 73
Discharge: HOME | End: 2018-02-28
Payer: COMMERCIAL

## 2018-02-28 DIAGNOSIS — R20.2 PARESTHESIAS IN RIGHT HAND: ICD-10-CM

## 2018-02-28 DIAGNOSIS — Z12.31 ENCOUNTER FOR SCREENING MAMMOGRAM FOR MALIGNANT NEOPLASM OF BREAST: ICD-10-CM

## 2018-02-28 PROCEDURE — 95908 NRV CNDJ TST 3-4 STUDIES: CPT | Performed by: PHYSICAL MEDICINE & REHABILITATION

## 2018-02-28 PROCEDURE — 95886 MUSC TEST DONE W/N TEST COMP: CPT | Performed by: PHYSICAL MEDICINE & REHABILITATION

## 2018-02-28 PROCEDURE — 95860 NEEDLE EMG 1 EXTREMITY: CPT | Performed by: PHYSICAL MEDICINE & REHABILITATION

## 2018-02-28 PROCEDURE — 77067 SCR MAMMO BI INCL CAD: CPT

## 2018-02-28 NOTE — PROCEDURES
Procedures      Electromyogram and Nerve Conduction Velocity Procedure Note    HX:   This is a 53-year-old right-hand-dominant female with numbness in the 1st 4 digits of the right hand after wrist fracture  She has no associated neck or shoulder pain  She has not had a prior electrodiagnostic study  She sustained a distal radius fracture approximately 10/07/2017  This was treated non operatively  PMH:   Type 2 diabetes, lumbar spinal stenosis    Exam:    there is deformity of the distal radius  Tinel sign is unremarkable  There is no signs of any focal or lateralizing motor weakness  There is definite loss of sensation in the median territory  There is no fasciculations atrophy or tremors  Procedure:  Verbal informed consent was obtained as with all electrodiagnostic medicine patients  As with all patients this patient was informed that they may terminate the  examine at any time  Patient tolerated the procedure well with no adverse effects reported or observed  Findings:  Please see the Zachary Prell data printout  Conclusion:     1  There is definite evidence of focal significant motor and sensory fiber slowing of the right median nerve at the right wrist   The supports clinical diagnosis of a severe "carpal tunnel syndrome"  2  In addition there is diffuse slowing and partial conduction block of the ulnar nerve at the region of the proximal forearm/ elbow  There is no signs of distal motor axonal injury occurring with this ulnar neuropathy  3  There is no electrophysiologic dated indicated suggest a cervical radiculopathy, plexopathy, or large fiber polyneuropathy  Recommendations:         Careful clinical correlation is advised

## 2018-03-02 DIAGNOSIS — R91.1 INCIDENTAL LUNG NODULE: Primary | ICD-10-CM

## 2018-03-03 DIAGNOSIS — H40.9 GLAUCOMA OF BOTH EYES, UNSPECIFIED GLAUCOMA TYPE: ICD-10-CM

## 2018-03-05 DIAGNOSIS — K57.92 ACUTE DIVERTICULITIS: Primary | ICD-10-CM

## 2018-03-05 RX ORDER — METRONIDAZOLE 500 MG/1
500 TABLET ORAL EVERY 8 HOURS SCHEDULED
Qty: 30 TABLET | Refills: 1 | Status: SHIPPED | OUTPATIENT
Start: 2018-03-05 | End: 2018-03-16 | Stop reason: ALTCHOICE

## 2018-03-05 RX ORDER — LATANOPROST 50 UG/ML
1 SOLUTION/ DROPS OPHTHALMIC
Qty: 2.5 ML | Refills: 0 | Status: SHIPPED | OUTPATIENT
Start: 2018-03-05 | End: 2018-08-06 | Stop reason: SDUPTHER

## 2018-03-12 ENCOUNTER — OFFICE VISIT (OUTPATIENT)
Dept: OBGYN CLINIC | Facility: CLINIC | Age: 73
End: 2018-03-12
Payer: COMMERCIAL

## 2018-03-12 VITALS
WEIGHT: 163 LBS | SYSTOLIC BLOOD PRESSURE: 126 MMHG | HEART RATE: 105 BPM | BODY MASS INDEX: 26.2 KG/M2 | DIASTOLIC BLOOD PRESSURE: 77 MMHG | HEIGHT: 66 IN

## 2018-03-12 DIAGNOSIS — G56.22 CUBITAL TUNNEL SYNDROME ON LEFT: ICD-10-CM

## 2018-03-12 DIAGNOSIS — G56.02 CARPAL TUNNEL SYNDROME ON LEFT: Primary | ICD-10-CM

## 2018-03-12 PROCEDURE — 99212 OFFICE O/P EST SF 10 MIN: CPT | Performed by: ORTHOPAEDIC SURGERY

## 2018-03-12 NOTE — PROGRESS NOTES
Assessment:  1  Carpal tunnel syndrome on left     2  Cubital tunnel syndrome on left       Patient Active Problem List   Diagnosis    Pain in right wrist           Plan       THE PATIENT HAS SPINE SURGERY COMING UP AND NEEDS TO DEAL WITH THIS 1ST BEFORE HAVING ANY CARPAL TUNNEL SURGERY  OF NOTE THE CARPAL TUNNEL HAS MODERATE COMPRESSION AND DETERIORATION  THE CUBITAL TUNNEL HAS SOME MILD FX AS WELL THIS IS VERY MILD DOES NOT NECESSARILY NEED SURGERY BUT IF SHE WOULD LIKE TO DO SURGERY FOR THE CUBITAL TUNNEL AS WELL AS CARPAL TUNNEL THE SAME TIME WE CAN ALWAYS PROVIDE THAT FOR HER  PATIENT WILL FOLLOW UP WITH US ONCE THE SPINE SURGERIES DONE AND SHE IS CLEARED FOR ANY SURGICAL INTERVENTION IN THE MEANTIME WE WILL TAKE CARE THIS NON OPERATIVELY  Subjective:     Patient ID:    Chief Complaint:Mattie Varela 74 y o  female      HPI      PATIENT COMES IN TODAY WITH REGARDS TO HER RIGHT WRIST AND ELBOW SHE WAS SENT FOR AN EMG TO RULE OUT CARPAL TUNNEL SHE COMES BACK TO REVIEW THE EMG  The following portions of the patient's history were reviewed and updated as appropriate: allergies, current medications, past family history, past social history, past surgical history and problem list         Social History     Social History    Marital status:      Spouse name: N/A    Number of children: N/A    Years of education: N/A     Occupational History    Not on file       Social History Main Topics    Smoking status: Former Smoker    Smokeless tobacco: Never Used    Alcohol use No    Drug use: No    Sexual activity: Not on file     Other Topics Concern    Not on file     Social History Narrative    No narrative on file     Past Medical History:   Diagnosis Date    Acid reflux     Anxiety     Arthritis     Chronic narcotic dependence (HCC)     Chronic pain     Colon polyp     Cystocele     Diabetes mellitus ([facility] [address] )     Diverticulosis     Fibromyalgia     Gastric ulcer     Gastroparesis     Hyperlipidemia     Hypertension     IBS (irritable bowel syndrome)     Nephrolithiasis     Post laminectomy syndrome     Seasonal allergies     Spinal stenosis      Past Surgical History:   Procedure Laterality Date    APPENDECTOMY      CHOLECYSTECTOMY      ESOPHAGOGASTRODUODENOSCOPY N/A 9/28/2016    Procedure: ESOPHAGOGASTRODUODENOSCOPY (EGD); Surgeon: Stephanie Rocha MD;  [facility]; Service:     HERNIA REPAIR      HYSTERECTOMY      LAMINECTOMY      DC COLONOSCOPY FLX DX W/COLLJ SPEC WHEN PFRMD N/A 3/2/2016    Procedure: EGD AND COLONOSCOPY;  Surgeon: Stephanie Rocha MD;  [facility]; Service: Gastroenterology    DC DILATE ESOPHAGUS N/A 9/28/2016    Procedure: DILATATION ESOPHAGEAL;  Surgeon: Stephanie Rocha MD;  [facility]; Service: Gastroenterology    DC ESOPHAGOGASTRODUODENOSCOPY TRANSORAL DIAGNOSTIC N/A 7/18/2016    Procedure: ESOPHAGOGASTRODUODENOSCOPY (EGD); Surgeon: Stephanie Rocha MD;  [facility]; Service: Gastroenterology     Allergies   Allergen Reactions    Penicillins Anaphylaxis     Other reaction(s): Unknown Reaction    Sulfa Antibiotics Anaphylaxis     Other reaction(s): Unknown Reaction    Asparaginase Derivatives Other (See Comments)     htn    Aspartame     Iodinated Diagnostic Agents Hives    Other      Other reaction(s): Unknown Reaction  Contrast dye       Current Outpatient Prescriptions on File Prior to Visit   Medication Sig Dispense Refill    Azilsartan Medoxomil (EDARBI) 40 MG TABS Take by mouth daily        ciprofloxacin (CIPRO) 500 mg tablet Take 1 tablet (500 mg total) by mouth every 12 (twelve) hours for 14 days 14 tablet 0    fentaNYL (DURAGESIC) 50 mcg/hr Place 1 patch on the skin every 3 (three) days Max Daily Amount: 1 patch 5 patch 0    gabapentin (NEURONTIN) 100 mg capsule Take 100 mg by mouth daily at bedtime  3    latanoprost (XALATAN) 0 005 % ophthalmic solution ADMINISTER 1 DROP TO BOTH EYES DAILY AT BEDTIME FOR 30 DAYS 2 5 mL 0    Lutein 10 MG TABS Take by mouth daily   meclizine (ANTIVERT) 25 mg tablet       meloxicam (MOBIC) 15 mg tablet Take 15 mg by mouth daily  3    metFORMIN (GLUCOPHAGE) 1000 MG tablet Take 1,000 mg by mouth 2 (two) times a day with meals      Meth-Hyo-M Bl-Na Phos-Ph Sal (URIBEL) 118 MG CAPS Take by mouth      metroNIDAZOLE (FLAGYL) 500 mg tablet Take 1 tablet (500 mg total) by mouth every 8 (eight) hours for 10 days 30 tablet 1    Milnacipran HCl (SAVELLA) 100 MG TABS Take 1 tablet by mouth daily      oxybutynin (DITROPAN XL) 15 MG 24 hr tablet Take 1 tablet by mouth 2 (two) times a day      oxyCODONE-acetaminophen (PERCOCET)  mg per tablet Take 1 tablet by mouth every 6 (six) hours as needed for moderate pain Max Daily Amount: 4 tablets 120 tablet 0    pitavastatin (LIVALO) 2 mg Take 1 mg by mouth daily with dinner   pantoprazole (PROTONIX) 40 mg tablet Take 1 tablet (40 mg total) by mouth 2 (two) times daily after meals for 30 days 60 tablet 3     No current facility-administered medications on file prior to visit  Objective:        Ortho Exam        I have personally reviewed pertinent films in PACS and my interpretation is MODERATE CARPAL TUNNEL RIGHT WRIST VERY MILD CUBITAL TUNNEL RIGHT ELBOW  Portions of the record may have been created with voice recognition software   Occasional wrong word or "sound a like" substitutions may have occurred due to the inherent limitations of voice recognition software   Read the chart carefully and recognize, using context, where substitutions have occurred

## 2018-03-16 ENCOUNTER — OFFICE VISIT (OUTPATIENT)
Dept: NEUROSURGERY | Facility: CLINIC | Age: 73
End: 2018-03-16
Payer: COMMERCIAL

## 2018-03-16 ENCOUNTER — HOSPITAL ENCOUNTER (OUTPATIENT)
Dept: RADIOLOGY | Facility: HOSPITAL | Age: 73
Discharge: HOME | End: 2018-03-16
Attending: NEUROLOGICAL SURGERY
Payer: COMMERCIAL

## 2018-03-16 VITALS
HEIGHT: 66 IN | SYSTOLIC BLOOD PRESSURE: 157 MMHG | DIASTOLIC BLOOD PRESSURE: 92 MMHG | TEMPERATURE: 96.7 F | WEIGHT: 176.8 LBS | HEART RATE: 96 BPM | RESPIRATION RATE: 20 BRPM | BODY MASS INDEX: 28.42 KG/M2

## 2018-03-16 DIAGNOSIS — M96.1 FAILED BACK SURGICAL SYNDROME: ICD-10-CM

## 2018-03-16 DIAGNOSIS — M48.062 SPINAL STENOSIS OF LUMBAR REGION WITH NEUROGENIC CLAUDICATION: ICD-10-CM

## 2018-03-16 DIAGNOSIS — M96.1 FAILED BACK SURGICAL SYNDROME: Primary | ICD-10-CM

## 2018-03-16 DIAGNOSIS — Z98.1 STATUS POST LUMBAR SPINAL FUSION: ICD-10-CM

## 2018-03-16 PROCEDURE — 72120 X-RAY BEND ONLY L-S SPINE: CPT

## 2018-03-16 PROCEDURE — 99204 OFFICE O/P NEW MOD 45 MIN: CPT | Performed by: NEUROLOGICAL SURGERY

## 2018-03-16 RX ORDER — PANTOPRAZOLE SODIUM 40 MG/1
40 TABLET, DELAYED RELEASE ORAL AS NEEDED
COMMUNITY
End: 2018-04-13 | Stop reason: SDUPTHER

## 2018-03-16 NOTE — PROGRESS NOTES
Office Note - Neurosurgery   Hilton Viramontes 74 y o  female MRN: [id number]      Assessment:    Patient is gradually worsening  15-year-old woman with previous lumbar fusion and failed back syndrome and chronic pain with spinal cord stimulator in place  More recent decline in gait secondary to weakness is concerning for lumbar neurogenic claudication  CT would indicate that there is some stenosis at L2-3 and L3-4  Previous MRI of the thoracic spine also demonstrates possible stenosis at L1-2  Her case will be reviewed with neuro Radiology to see if she is able to proceed with MRI of the lumbar spine  Otherwise, CT myelogram may be required for definitive surgical planning  She will also obtain flexion-extension films of the lumbar spine to check for any instability  Ideally she will only require decompression, however there is a possibility a more extensive decompression fusion may be necessary based on additional imaging  We will arrange for an MRI versus myelogram the pending on the results of further discussion with neuro Radiology  Apparently, her pain specialist feels that she may benefit from a different type of stimulator and I will discuss this further with him  They will follow-up in 3 weeks time  History, physical examination and diagnostic tests were reviewed and questions answered  Diagnosis, care plan and treatment options were discussed  With the patient and her daughter  The patient understand instructions and will follow up as directed      Plan:    Follow-up: in 3 week(s)    Problem List Items Addressed This Visit        Other    Failed back surgical syndrome - Primary    Relevant Orders    XR lumbar sp/bending only min 2-3 v    Status post lumbar spinal fusion      Other Visit Diagnoses     Spinal stenosis of lumbar region with neurogenic claudication        Relevant Orders    XR lumbar sp/bending only min 2-3 v          Subjective/Objective     Chief Complaint     progressive leg weakness  HPI      26-year-old woman accompanied by her daughter today  She underwent lumbar fusion over 20 years ago  She has chronic pain and had a spinal cord stimulator placed approximately 5 years ago with improvement in her pain  This really has not  Changed and her pain medication is listed below  Of more concern however is a change in the patient's gait and difficulties ambulating for more than 60 or 70 feet  The patient describes progressive clumsiness in her legs the longer she stands and walks  It can take hours for the strength returned afterwards  Apparently vascular studies have been negative  She denies any difficulties with bowel bladder function though she has occasional urinary leakage  She denies any changing perineal sensation  Epidural steroid injections have not been helpful  She presents today with a CT scan of the lumbar spine and to discuss her surgical options  All her previous surgeon told her that she could not have MRIs with her spinal instrumentation, she did have an MRI of the thoracic spine prior to proceeding with spinal cord stimulator trial         ROS    Review of Systems   Constitutional:        Feeling poorly   HENT: Negative  Eyes: Negative  Respiratory: Negative  Cardiovascular: Negative  Gastrointestinal: Negative  Gastroparesis   Endocrine: Negative  Genitourinary: Positive for frequency and urgency  Leakage   Musculoskeletal: Positive for back pain (across lower back radiates into bilateral buttocks and legs)  Skin: Negative  Allergic/Immunologic: Negative  Neurological: Positive for weakness (bilateral legs), numbness (bilateral legs) and headaches  Negative for dizziness, seizures, syncope and light-headedness  Balance off and frequent falls   Hematological: Negative  Psychiatric/Behavioral: Positive for sleep disturbance (due to pain)  Negative for confusion         Family History    Family History Problem Relation Age of Onset    Lung cancer Mother     Pulmonary embolism Father     Stroke Maternal Grandmother     Heart attack Maternal Grandfather     No Known Problems Paternal Grandmother     No Known Problems Paternal Grandfather        Social History    Social History     Social History    Marital status:      Spouse name: N/A    Number of children: N/A    Years of education: N/A     Occupational History    Not on file  Social History Main Topics    Smoking status: Former Smoker    Smokeless tobacco: Never Used    Alcohol use No    Drug use: No    Sexual activity: Not on file     Other Topics Concern    Not on file     Social History Narrative    No narrative on file       Past Medical History    Past Medical History:   Diagnosis Date    Acid reflux     Anxiety     Arthritis     Asthma     Chronic narcotic dependence (HCC)     Chronic pain     Colon polyp     Cystocele     Diabetes mellitus ([facility])     Diverticulosis     Fibromyalgia     Gastric ulcer     Gastroparesis     Hyperlipidemia     Hypertension     IBS (irritable bowel syndrome)     Kidney stone     Post laminectomy syndrome     Seasonal allergies     Spinal stenosis        Surgical History    Past Surgical History:   Procedure Laterality Date    APPENDECTOMY      CHOLECYSTECTOMY      ESOPHAGOGASTRODUODENOSCOPY N/A 9/28/2016    Procedure: ESOPHAGOGASTRODUODENOSCOPY (EGD); Surgeon: Francine Wright MD;  [facility]; Service:     HERNIA REPAIR      HYSTERECTOMY      LAMINECTOMY      LUMBAR LAMINECTOMY      OR COLONOSCOPY FLX DX W/COLLJ SPEC WHEN PFRMD N/A 3/2/2016    Procedure: EGD AND COLONOSCOPY;  Surgeon: Francine Wright MD;  [facility]; Service: Gastroenterology    OR DILATE ESOPHAGUS N/A 9/28/2016    Procedure: DILATATION ESOPHAGEAL;  Surgeon: Francine Wright MD;  [facility];   Service: Gastroenterology    OR ESOPHAGOGASTRODUODENOSCOPY TRANSORAL DIAGNOSTIC N/A 7/18/2016    Procedure: ESOPHAGOGASTRODUODENOSCOPY (EGD); Surgeon: Paulette Parker MD;  [facility]; Service: Gastroenterology       Medications      Current Outpatient Prescriptions:     Azilsartan Medoxomil (EDARBI) 40 MG TABS, Take by mouth daily  , Disp: , Rfl:     fentaNYL (DURAGESIC) 50 mcg/hr, Place 1 patch on the skin every 3 (three) days Max Daily Amount: 1 patch, Disp: 5 patch, Rfl: 0    gabapentin (NEURONTIN) 100 mg capsule, Take 100 mg by mouth daily at bedtime, Disp: , Rfl: 3    latanoprost (XALATAN) 0 005 % ophthalmic solution, ADMINISTER 1 DROP TO BOTH EYES DAILY AT BEDTIME FOR 30 DAYS, Disp: 2 5 mL, Rfl: 0    Lutein 10 MG TABS, Take by mouth daily  , Disp: , Rfl:     meclizine (ANTIVERT) 25 mg tablet, as needed  , Disp: , Rfl:     metFORMIN (GLUCOPHAGE) 1000 MG tablet, Take 1,000 mg by mouth 2 (two) times a day with meals, Disp: , Rfl:     Meth-Hyo-M Bl-Na Phos-Ph Sal (URIBEL) 118 MG CAPS, Take by mouth, Disp: , Rfl:     Milnacipran HCl (SAVELLA) 100 MG TABS, Take 1 tablet by mouth daily, Disp: , Rfl:     oxybutynin (DITROPAN XL) 15 MG 24 hr tablet, Take 1 tablet by mouth 2 (two) times a day, Disp: , Rfl:     oxyCODONE-acetaminophen (PERCOCET)  mg per tablet, Take 1 tablet by mouth every 6 (six) hours as needed for moderate pain Max Daily Amount: 4 tablets, Disp: 120 tablet, Rfl: 0    pantoprazole (PROTONIX) 40 mg tablet, Take 40 mg by mouth as needed, Disp: , Rfl:     pitavastatin (LIVALO) 2 mg, Take 1 mg by mouth daily with dinner , Disp: , Rfl:     meloxicam (MOBIC) 15 mg tablet, Take 15 mg by mouth daily, Disp: , Rfl: 3    pantoprazole (PROTONIX) 40 mg tablet, Take 1 tablet (40 mg total) by mouth 2 (two) times daily after meals for 30 days, Disp: 60 tablet, Rfl: 3    Allergies    Allergies   Allergen Reactions    Penicillins Anaphylaxis     Other reaction(s): Unknown Reaction    Sulfa Antibiotics Anaphylaxis     Other reaction(s): Unknown Reaction    Asparaginase Derivatives Other (See Comments)     htn    Aspartame     Iodinated Diagnostic Agents Hives    Other      Other reaction(s): Unknown Reaction  Contrast dye         The following portions of the patient's history were reviewed and updated as appropriate: allergies, current medications, past family history, past medical history, past social history, past surgical history and problem list     Investigations    I personally reviewed the CT results with the patient:      CT scan of the lumbar spine without contrast dated February 20th, 2018  Normal lumbar lordosis  Multiple levels of degenerative disc disease and facet arthropathy  Previous L4-5 interbody graft and posterior decompression  At L5-S1 there is biforaminal stenosis secondary to degenerative changes  At L2-3 and L3-4 there is moderate to severe central and bilateral lateral recess stenosis secondary to degenerative changes  No other areas of obvious stenosis  Physical Exam    Vitals:  Blood pressure 157/92, pulse 96, temperature (!) 96 7 °F (35 9 °C), resp  rate 20, height 5' 5 5" (1 664 m), weight 80 2 kg (176 lb 12 8 oz)  ,Body mass index is 28 97 kg/m²  Physical Exam   Constitutional: She appears well-developed and well-nourished  Musculoskeletal:        Lumbar back: She exhibits decreased range of motion  She exhibits no tenderness and no bony tenderness  Midline lumbar incision well healed  Neurological: She has a normal Romberg Test    Reflex Scores:       Patellar reflexes are 0 on the right side and 0 on the left side  Achilles reflexes are 0 on the right side and 0 on the left side  Psychiatric: She has a normal mood and affect  Her behavior is normal      Neurologic Exam     Motor Exam   5/5 power in lower extremities  Sensory Exam     Mildly diminished light touch and pinprick sensation in the right foot  Mildly diminished vibration sensation at both great toes       Gait, Coordination, and Reflexes     Coordination Romberg: negative    Reflexes   Right patellar: 0  Left patellar: 0  Right achilles: 0  Left achilles: 0  Right ankle clonus: absent  Left ankle clonus: absent  Walks with a short shuffling gait with toes pointed outwards

## 2018-03-16 NOTE — LETTER
March 16, 2018     [facility], [address] [facility]  [address] [place] [address]    Patient: Darshana Munoz   YOB: 1945   Date of Visit: 3/16/2018       Dear Dr Tree Corral: Thank you for referring Krystle Nur to me for evaluation  Below are my notes for this consultation  If you have questions, please do not hesitate to call me  I look forward to following your patient along with you  Sincerely,        Dandre Crook MD        CC: MD Dandre Vergara MD  3/16/2018  4:59 PM  Sign at close encounter  Office Note - Neurosurgery   Darshana Munoz 74 y o  female MRN: [id number]      Assessment:    Patient is gradually worsening  75-year-old woman with previous lumbar fusion and failed back syndrome and chronic pain with spinal cord stimulator in place  More recent decline in gait secondary to weakness is concerning for lumbar neurogenic claudication  CT would indicate that there is some stenosis at L2-3 and L3-4  Previous MRI of the thoracic spine also demonstrates possible stenosis at L1-2  Her case will be reviewed with neuro Radiology to see if she is able to proceed with MRI of the lumbar spine  Otherwise, CT myelogram may be required for definitive surgical planning  She will also obtain flexion-extension films of the lumbar spine to check for any instability  Ideally she will only require decompression, however there is a possibility a more extensive decompression fusion may be necessary based on additional imaging  We will arrange for an MRI versus myelogram the pending on the results of further discussion with neuro Radiology  Apparently, her pain specialist feels that she may benefit from a different type of stimulator and I will discuss this further with him  They will follow-up in 3 weeks time  History, physical examination and diagnostic tests were reviewed and questions answered   Diagnosis, care plan and treatment options were discussed  With the patient and her daughter  The patient understand instructions and will follow up as directed  Plan:    Follow-up: in 3 week(s)    Problem List Items Addressed This Visit        Other    Failed back surgical syndrome - Primary    Relevant Orders    XR lumbar sp/bending only min 2-3 v    Status post lumbar spinal fusion      Other Visit Diagnoses     Spinal stenosis of lumbar region with neurogenic claudication        Relevant Orders    XR lumbar sp/bending only min 2-3 v          Subjective/Objective     Chief Complaint     progressive leg weakness  HPI      75-year-old woman accompanied by her daughter today  She underwent lumbar fusion over 20 years ago  She has chronic pain and had a spinal cord stimulator placed approximately 5 years ago with improvement in her pain  This really has not  Changed and her pain medication is listed below  Of more concern however is a change in the patient's gait and difficulties ambulating for more than 60 or 70 feet  The patient describes progressive clumsiness in her legs the longer she stands and walks  It can take hours for the strength returned afterwards  Apparently vascular studies have been negative  She denies any difficulties with bowel bladder function though she has occasional urinary leakage  She denies any changing perineal sensation  Epidural steroid injections have not been helpful  She presents today with a CT scan of the lumbar spine and to discuss her surgical options  All her previous surgeon told her that she could not have MRIs with her spinal instrumentation, she did have an MRI of the thoracic spine prior to proceeding with spinal cord stimulator trial         ROS    Review of Systems   Constitutional:        Feeling poorly   HENT: Negative  Eyes: Negative  Respiratory: Negative  Cardiovascular: Negative  Gastrointestinal: Negative  Gastroparesis   Endocrine: Negative      Genitourinary: Positive for frequency and urgency  Leakage   Musculoskeletal: Positive for back pain (across lower back radiates into bilateral buttocks and legs)  Skin: Negative  Allergic/Immunologic: Negative  Neurological: Positive for weakness (bilateral legs), numbness (bilateral legs) and headaches  Negative for dizziness, seizures, syncope and light-headedness  Balance off and frequent falls   Hematological: Negative  Psychiatric/Behavioral: Positive for sleep disturbance (due to pain)  Negative for confusion  Family History    Family History   Problem Relation Age of Onset    Lung cancer Mother     Pulmonary embolism Father     Stroke Maternal Grandmother     Heart attack Maternal Grandfather     No Known Problems Paternal Grandmother     No Known Problems Paternal Grandfather        Social History    Social History     Social History    Marital status:      Spouse name: N/A    Number of children: N/A    Years of education: N/A     Occupational History    Not on file       Social History Main Topics    Smoking status: Former Smoker    Smokeless tobacco: Never Used    Alcohol use No    Drug use: No    Sexual activity: Not on file     Other Topics Concern    Not on file     Social History Narrative    No narrative on file       Past Medical History    Past Medical History:   Diagnosis Date    Acid reflux     Anxiety     Arthritis     Asthma     Chronic narcotic dependence (HCC)     Chronic pain     Colon polyp     Cystocele     Diabetes mellitus ([facility] [address] )     Diverticulosis     Fibromyalgia     Gastric ulcer     Gastroparesis     Hyperlipidemia     Hypertension     IBS (irritable bowel syndrome)     Kidney stone     Post laminectomy syndrome     Seasonal allergies     Spinal stenosis        Surgical History    Past Surgical History:   Procedure Laterality Date    APPENDECTOMY      CHOLECYSTECTOMY      ESOPHAGOGASTRODUODENOSCOPY N/A 9/28/2016    Procedure: ESOPHAGOGASTRODUODENOSCOPY (EGD); Surgeon: Surjit Zaman MD;  [facility]; Service:     HERNIA REPAIR      HYSTERECTOMY      LAMINECTOMY      LUMBAR LAMINECTOMY      OK COLONOSCOPY FLX DX W/COLLJ SPEC WHEN PFRMD N/A 3/2/2016    Procedure: EGD AND COLONOSCOPY;  Surgeon: Surjit Zaman MD;  [facility]; Service: Gastroenterology    OK DILATE ESOPHAGUS N/A 9/28/2016    Procedure: DILATATION ESOPHAGEAL;  Surgeon: Surjit Zaman MD;  [facility]; Service: Gastroenterology    OK ESOPHAGOGASTRODUODENOSCOPY TRANSORAL DIAGNOSTIC N/A 7/18/2016    Procedure: ESOPHAGOGASTRODUODENOSCOPY (EGD); Surgeon: Surjit Zaman MD;  [facility]; Service: Gastroenterology       Medications      Current Outpatient Prescriptions:     Azilsartan Medoxomil (EDARBI) 40 MG TABS, Take by mouth daily  , Disp: , Rfl:     fentaNYL (DURAGESIC) 50 mcg/hr, Place 1 patch on the skin every 3 (three) days Max Daily Amount: 1 patch, Disp: 5 patch, Rfl: 0    gabapentin (NEURONTIN) 100 mg capsule, Take 100 mg by mouth daily at bedtime, Disp: , Rfl: 3    latanoprost (XALATAN) 0 005 % ophthalmic solution, ADMINISTER 1 DROP TO BOTH EYES DAILY AT BEDTIME FOR 30 DAYS, Disp: 2 5 mL, Rfl: 0    Lutein 10 MG TABS, Take by mouth daily  , Disp: , Rfl:     meclizine (ANTIVERT) 25 mg tablet, as needed  , Disp: , Rfl:     metFORMIN (GLUCOPHAGE) 1000 MG tablet, Take 1,000 mg by mouth 2 (two) times a day with meals, Disp: , Rfl:     Meth-Hyo-M Bl-Na Phos-Ph Sal (URIBEL) 118 MG CAPS, Take by mouth, Disp: , Rfl:     Milnacipran HCl (SAVELLA) 100 MG TABS, Take 1 tablet by mouth daily, Disp: , Rfl:     oxybutynin (DITROPAN XL) 15 MG 24 hr tablet, Take 1 tablet by mouth 2 (two) times a day, Disp: , Rfl:     oxyCODONE-acetaminophen (PERCOCET)  mg per tablet, Take 1 tablet by mouth every 6 (six) hours as needed for moderate pain Max Daily Amount: 4 tablets, Disp: 120 tablet, Rfl: 0    pantoprazole (PROTONIX) 40 mg tablet, Take 40 mg by mouth as needed, Disp: , Rfl:     pitavastatin (LIVALO) 2 mg, Take 1 mg by mouth daily with dinner , Disp: , Rfl:     meloxicam (MOBIC) 15 mg tablet, Take 15 mg by mouth daily, Disp: , Rfl: 3    pantoprazole (PROTONIX) 40 mg tablet, Take 1 tablet (40 mg total) by mouth 2 (two) times daily after meals for 30 days, Disp: 60 tablet, Rfl: 3    Allergies    Allergies   Allergen Reactions    Penicillins Anaphylaxis     Other reaction(s): Unknown Reaction    Sulfa Antibiotics Anaphylaxis     Other reaction(s): Unknown Reaction    Asparaginase Derivatives Other (See Comments)     htn    Aspartame     Iodinated Diagnostic Agents Hives    Other      Other reaction(s): Unknown Reaction  Contrast dye         The following portions of the patient's history were reviewed and updated as appropriate: allergies, current medications, past family history, past medical history, past social history, past surgical history and problem list     Investigations    I personally reviewed the CT results with the patient:      CT scan of the lumbar spine without contrast dated February 20th, 2018  Normal lumbar lordosis  Multiple levels of degenerative disc disease and facet arthropathy  Previous L4-5 interbody graft and posterior decompression  At L5-S1 there is biforaminal stenosis secondary to degenerative changes  At L2-3 and L3-4 there is moderate to severe central and bilateral lateral recess stenosis secondary to degenerative changes  No other areas of obvious stenosis  Physical Exam    Vitals:  Blood pressure 157/92, pulse 96, temperature (!) 96 7 °F (35 9 °C), resp  rate 20, height 5' 5 5" (1 664 m), weight 80 2 kg (176 lb 12 8 oz)  ,Body mass index is 28 97 kg/m²  Physical Exam   Constitutional: She appears well-developed and well-nourished  Musculoskeletal:        Lumbar back: She exhibits decreased range of motion  She exhibits no tenderness and no bony tenderness       Midline lumbar incision well healed  Neurological: She has a normal Romberg Test    Reflex Scores:       Patellar reflexes are 0 on the right side and 0 on the left side  Achilles reflexes are 0 on the right side and 0 on the left side  Psychiatric: She has a normal mood and affect  Her behavior is normal      Neurologic Exam     Motor Exam   5/5 power in lower extremities  Sensory Exam     Mildly diminished light touch and pinprick sensation in the right foot  Mildly diminished vibration sensation at both great toes  Gait, Coordination, and Reflexes     Coordination   Romberg: negative    Reflexes   Right patellar: 0  Left patellar: 0  Right achilles: 0  Left achilles: 0  Right ankle clonus: absent  Left ankle clonus: absent  Walks with a short shuffling gait with toes pointed outwards

## 2018-03-21 DIAGNOSIS — M48.062 LUMBAR STENOSIS WITH NEUROGENIC CLAUDICATION: Primary | ICD-10-CM

## 2018-03-29 ENCOUNTER — HOSPITAL ENCOUNTER (OUTPATIENT)
Dept: CT IMAGING | Facility: HOSPITAL | Age: 73
Discharge: HOME | End: 2018-03-29
Payer: COMMERCIAL

## 2018-03-29 DIAGNOSIS — R91.1 INCIDENTAL LUNG NODULE: ICD-10-CM

## 2018-03-29 PROCEDURE — 71250 CT THORAX DX C-: CPT

## 2018-04-03 ENCOUNTER — TRANSCRIBE ORDERS (OUTPATIENT)
Dept: ADMINISTRATIVE | Facility: HOSPITAL | Age: 73
End: 2018-04-03

## 2018-04-03 DIAGNOSIS — Z98.1 STATUS POST LUMBAR SPINAL FUSION: Primary | ICD-10-CM

## 2018-04-03 DIAGNOSIS — M96.1 FAILED BACK SYNDROME OF LUMBAR SPINE: ICD-10-CM

## 2018-04-13 DIAGNOSIS — M48.062 SPINAL STENOSIS OF LUMBAR REGION WITH NEUROGENIC CLAUDICATION: Primary | ICD-10-CM

## 2018-04-13 DIAGNOSIS — K31.84 GASTROPARESIS: Primary | ICD-10-CM

## 2018-04-13 DIAGNOSIS — K58.2 IRRITABLE BOWEL SYNDROME WITH BOTH CONSTIPATION AND DIARRHEA: ICD-10-CM

## 2018-04-13 RX ORDER — METAXALONE 800 MG/1
800 TABLET ORAL 3 TIMES DAILY
Qty: 90 TABLET | Refills: 3 | Status: SHIPPED | OUTPATIENT
Start: 2018-04-13 | End: 2018-06-08 | Stop reason: HOSPADM

## 2018-04-13 RX ORDER — DICYCLOMINE HYDROCHLORIDE 10 MG/1
10 CAPSULE ORAL 3 TIMES DAILY PRN
Qty: 90 CAPSULE | Refills: 2 | Status: SHIPPED | OUTPATIENT
Start: 2018-04-13 | End: 2019-01-07 | Stop reason: SDUPTHER

## 2018-04-13 RX ORDER — METOCLOPRAMIDE 5 MG/1
5 TABLET ORAL 4 TIMES DAILY
Qty: 120 TABLET | Refills: 3 | Status: SHIPPED | OUTPATIENT
Start: 2018-04-13 | End: 2018-05-13

## 2018-04-17 DIAGNOSIS — T50.8X5D ADVERSE EFFECT OF CONTRAST MEDIA, SUBSEQUENT ENCOUNTER: Primary | ICD-10-CM

## 2018-04-17 RX ORDER — METHYLPREDNISOLONE 32 MG/1
32 TABLET ORAL SEE ADMIN INSTRUCTIONS
Qty: 2 TABLET | Refills: 0 | OUTPATIENT
Start: 2018-04-17 | End: 2018-05-09 | Stop reason: HOSPADM

## 2018-04-17 NOTE — TELEPHONE ENCOUNTER
Received call from patient stating that she is scheduled for a myelogram but she wanted to know if they use contrast dye because she has an allergy  Verified with [facility] that they do use contrast dye so the following medications were ordered for the patient:    Methylprednisone 32mg 12 hours prior  Methylprednisone 32mg 2 hours prior  Benedryl 50mg 1 hour prior  Patient has benedryl at home and she has 25mg tablets  I informed her that she should take 2 tablets equalling 50mg  Methylprednisone was called into patient's pharmacy on file which I verified with patient  She wrote down the instructions

## 2018-04-19 ENCOUNTER — HOSPITAL ENCOUNTER (OUTPATIENT)
Dept: RADIOLOGY | Facility: HOSPITAL | Age: 73
Discharge: HOME | End: 2018-04-19
Attending: NEUROLOGICAL SURGERY
Payer: COMMERCIAL

## 2018-04-19 VITALS
BODY MASS INDEX: 31.41 KG/M2 | HEIGHT: 60 IN | DIASTOLIC BLOOD PRESSURE: 83 MMHG | WEIGHT: 160 LBS | HEART RATE: 106 BPM | TEMPERATURE: 99.3 F | SYSTOLIC BLOOD PRESSURE: 173 MMHG | OXYGEN SATURATION: 95 % | RESPIRATION RATE: 18 BRPM

## 2018-04-19 DIAGNOSIS — M96.1 FAILED BACK SYNDROME OF LUMBAR SPINE: ICD-10-CM

## 2018-04-19 DIAGNOSIS — Z98.1 STATUS POST LUMBAR SPINAL FUSION: ICD-10-CM

## 2018-04-19 DIAGNOSIS — M48.062 LUMBAR STENOSIS WITH NEUROGENIC CLAUDICATION: ICD-10-CM

## 2018-04-19 LAB
APTT PPP: 27 SECONDS (ref 23–35)
INR PPP: 1 (ref 0.86–1.16)
PLATELET # BLD AUTO: 322 THOUSANDS/UL (ref 149–390)
PMV BLD AUTO: 8.6 FL (ref 8.9–12.7)
PROTHROMBIN TIME: 13.2 SECONDS (ref 12.1–14.4)

## 2018-04-19 PROCEDURE — 62305 MYELOGRAPHY LUMBAR INJECTION: CPT

## 2018-04-19 PROCEDURE — 72128 CT CHEST SPINE W/O DYE: CPT

## 2018-04-19 PROCEDURE — 85730 THROMBOPLASTIN TIME PARTIAL: CPT | Performed by: PHYSICIAN ASSISTANT

## 2018-04-19 PROCEDURE — 72131 CT LUMBAR SPINE W/O DYE: CPT

## 2018-04-19 PROCEDURE — 85610 PROTHROMBIN TIME: CPT | Performed by: PHYSICIAN ASSISTANT

## 2018-04-19 PROCEDURE — 85049 AUTOMATED PLATELET COUNT: CPT | Performed by: PHYSICIAN ASSISTANT

## 2018-04-19 RX ORDER — SODIUM CHLORIDE, SODIUM LACTATE, POTASSIUM CHLORIDE, CALCIUM CHLORIDE 600; 310; 30; 20 MG/100ML; MG/100ML; MG/100ML; MG/100ML
20 INJECTION, SOLUTION INTRAVENOUS CONTINUOUS
Status: DISCONTINUED | OUTPATIENT
Start: 2018-04-19 | End: 2018-04-19

## 2018-04-19 RX ORDER — SODIUM CHLORIDE 9 MG/ML
25 INJECTION, SOLUTION INTRAVENOUS CONTINUOUS
Status: DISCONTINUED | OUTPATIENT
Start: 2018-04-19 | End: 2018-04-20 | Stop reason: HOSPADM

## 2018-04-19 RX ADMIN — IOHEXOL 13 ML: 240 INJECTION, SOLUTION INTRATHECAL; INTRAVASCULAR; INTRAVENOUS; ORAL at 13:30

## 2018-04-19 RX ADMIN — SODIUM CHLORIDE 25 ML/HR: 0.9 INJECTION, SOLUTION INTRAVENOUS at 12:30

## 2018-04-19 NOTE — DISCHARGE INSTRUCTIONS
Computed Tomographic Myelography   WHAT YOU NEED TO KNOW:   Computed tomographic (CT) myelography is a procedure to examine your spinal canal  Contrast dye is used to help healthcare providers see your nerves, bones, or spinal cord more clearly  DISCHARGE INSTRUCTIONS:   Follow up with your healthcare provider or specialist as directed:  Write down your questions so you remember to ask them during your visits  Drink liquids as directed:  Liquids will help flush the contrast dye out of your body  Ask how much liquid to drink each day, and which liquids to drink  Some foods, such as soup and fruit, also provide liquid  Contact your healthcare provider or specialist if:   · You have bleeding or a discharge coming from where the needle was put into your back  · You have severe neck or back pain  · You have a headache or nausea that does not go away with rest and medicine  · You feel anxious or irritable  · You have questions or concerns about your condition or care  Seek care immediately or call 911 if:   · You have a stiff neck or have trouble thinking clearly  · You have numbness, tingling, or weakness anywhere below your waist     · You have a severe headache that does not get better  · You have a fever  © 2017 [employer]  Information is for End User's use only and may not be sold, redistributed or otherwise used for commercial purposes  All illustrations and images included in CareNotes® are the copyrighted property of [employer]  or [employer]  The above information is an  only  It is not intended as medical advice for individual conditions or treatments  Talk to your doctor, nurse or pharmacist before following any medical regimen to see if it is safe and effective for you

## 2018-04-24 ENCOUNTER — OFFICE VISIT (OUTPATIENT)
Dept: NEUROSURGERY | Facility: CLINIC | Age: 73
End: 2018-04-24
Payer: COMMERCIAL

## 2018-04-24 VITALS
WEIGHT: 170.4 LBS | BODY MASS INDEX: 33.45 KG/M2 | SYSTOLIC BLOOD PRESSURE: 160 MMHG | TEMPERATURE: 98.2 F | HEIGHT: 60 IN | DIASTOLIC BLOOD PRESSURE: 68 MMHG | HEART RATE: 105 BPM | RESPIRATION RATE: 16 BRPM

## 2018-04-24 DIAGNOSIS — M96.1 FAILED BACK SURGICAL SYNDROME: ICD-10-CM

## 2018-04-24 DIAGNOSIS — Z98.1 STATUS POST LUMBAR SPINAL FUSION: Primary | ICD-10-CM

## 2018-04-24 DIAGNOSIS — Z01.818 PRE-PROCEDURAL EXAMINATION: ICD-10-CM

## 2018-04-24 DIAGNOSIS — Z45.42 BATTERY END OF LIFE OF SPINAL CORD STIMULATOR: ICD-10-CM

## 2018-04-24 DIAGNOSIS — G95.20 COMPRESSION OF SPINAL CORD (HCC): ICD-10-CM

## 2018-04-24 DIAGNOSIS — M48.062 LUMBAR STENOSIS WITH NEUROGENIC CLAUDICATION: ICD-10-CM

## 2018-04-24 PROCEDURE — 99215 OFFICE O/P EST HI 40 MIN: CPT | Performed by: NEUROLOGICAL SURGERY

## 2018-04-24 RX ORDER — CHLORHEXIDINE GLUCONATE 0.12 MG/ML
15 RINSE ORAL EVERY 12 HOURS SCHEDULED
Status: CANCELLED | OUTPATIENT
Start: 2018-04-24

## 2018-04-24 RX ORDER — VANCOMYCIN HYDROCHLORIDE 1 G/200ML
1000 INJECTION, SOLUTION INTRAVENOUS ONCE
Status: CANCELLED | OUTPATIENT
Start: 2018-04-24 | End: 2018-04-24

## 2018-04-24 NOTE — PROGRESS NOTES
Office Note - Neurosurgery   Maria M Salamanca 74 y o  female MRN: [id number]      Assessment:    Patient is gradually worsening  Symptoms, as detailed in HPI, continue to significantly impact of patient's quality of life in daily activities  After carefully considering presentation, investigations, functional status and co-morbidities, the risk/benefit profile of surgical intervention is favorable  58-year-old woman with progressive lumbar neurogenic claudication and possible myelopathy with thoracolumbar spinal stenosis from T12-L4  She also has a left buttock IPG for spinal cord stimulator which is at end of life requiring increasing charging time and more frequent charging  She is a candidate for 1  Reopening of lumbar incision for T11-L5 posterior instrumented fixation fusion and T12-L4 posterior decompression with possible extension to additional levels and 2  Removal of left buttock IPG and placement of new left buttock implantable pulse generator  The goal of surgery is to relieve pressure neural structures and hopefully improve radicular pain and symptoms of neurogenic claudication and stabilize symptoms of thoracic myelopathy  Weakness, numbness and back pain are less likely to improve  The risks of surgery were described in detail  1  Risk of general anesthetic with possible cardiac and respiratory complication  Risk of infection and bleeding  2  Risk of neurological injury with new pain, weakness or numbness in the legs or difficulties with bowel and bladder function  Risk of CSF leak was described  3  Possible need for additional lumbar spine surgery in the future  With respect to replacing the left buttock IPG, hopefully the new system with up-to-date programming may provide further improvement in managing her chronic lower extremity radiculopathy and pain    The risks of this procedure were detailed and largely include failure treatment or infection of the battery requiring removal of part or the entire spinal cord stimulator system  Expected postoperative course, including activity restrictions, expected pain and postoperative medication were reviewed  Patient provided verbal consent to surgical procedure and signed consent form: Yes    History, physical examination and diagnostic tests were reviewed and questions answered  Diagnosis, care plan and treatment options were discussed  The patient and family member patient and daughter understand instructions and will follow up as directed      Plan:    Follow-up:   Surgery    Problem List Items Addressed This Visit        Nervous and Auditory    Compression of spinal cord (HCC)    Relevant Orders    UA (URINE) with reflex to Microscopic    Type and screen    Comprehensive metabolic panel    CBC and differential    APTT    Protime-INR    HEMOGLOBIN A1C W/ EAG ESTIMATION    MRSA culture    EKG 12 lead       Other    Failed back surgical syndrome    Relevant Orders    Case request operating room: Reopening of lumbar incision for T11-L5 posterior instrumented fixation and fusion and T12 -L4 posterior decompression with possible extension to additional levels, removal of left buttock implantable pulse generator a  (Completed)    Ambulatory referral to Family Practice    UA (URINE) with reflex to Microscopic    Type and screen    Comprehensive metabolic panel    CBC and differential    APTT    Protime-INR    HEMOGLOBIN A1C W/ EAG ESTIMATION    MRSA culture    EKG 12 lead    Status post lumbar spinal fusion - Primary    Relevant Orders    Case request operating room: Reopening of lumbar incision for T11-L5 posterior instrumented fixation and fusion and T12 -L4 posterior decompression with possible extension to additional levels, removal of left buttock implantable pulse generator a  (Completed)    Ambulatory referral to Family Practice    UA (URINE) with reflex to Microscopic    Type and screen    Comprehensive metabolic panel    CBC and differential    APTT    Protime-INR    HEMOGLOBIN A1C W/ EAG ESTIMATION    MRSA culture    EKG 12 lead    Lumbar stenosis with neurogenic claudication    Relevant Orders    Case request operating room: Reopening of lumbar incision for T11-L5 posterior instrumented fixation and fusion and T12 -L4 posterior decompression with possible extension to additional levels, removal of left buttock implantable pulse generator a  (Completed)    Ambulatory referral to Family Practice    UA (URINE) with reflex to Microscopic    Type and screen    Comprehensive metabolic panel    CBC and differential    APTT    Protime-INR    HEMOGLOBIN A1C W/ EAG ESTIMATION    MRSA culture    EKG 12 lead    Battery end of life of spinal cord stimulator    Relevant Orders    Case request operating room: Reopening of lumbar incision for T11-L5 posterior instrumented fixation and fusion and T12 -L4 posterior decompression with possible extension to additional levels, removal of left buttock implantable pulse generator a  (Completed)    Ambulatory referral to Family Practice    UA (URINE) with reflex to Microscopic    Type and screen    Comprehensive metabolic panel    CBC and differential    APTT    Protime-INR    HEMOGLOBIN A1C W/ EAG ESTIMATION    MRSA culture    EKG 12 lead      Other Visit Diagnoses     Pre-procedural examination        Relevant Orders    UA (URINE) with reflex to Microscopic    Type and screen    Comprehensive metabolic panel    CBC and differential    APTT    Protime-INR    HEMOGLOBIN A1C W/ EAG ESTIMATION    MRSA culture    EKG 12 lead          Subjective/Objective     Chief Complaint    Legs are weaker  HPI    Patient presents with her daughter today to review the results of recent myelogram of the lumbar spine  Since her last visit, she has had some progression of her difficulties with walking    She also noticed that it is taking longer period of time to charge the IPG for her spinal cord stimulator and that the charge runs down much quicker  She is still able to obtain coverage of her lower back and bilateral lower extremities however  Otherwise, her symptoms as reviewed below have not changed significantly  "77-year-old woman accompanied by her daughter today  She underwent lumbar fusion over 20 years ago  She has chronic pain and had a spinal cord stimulator placed approximately 5 years ago with improvement in her pain  This really has not  Changed and her pain medication is listed below  Of more concern however is a change in the patient's gait and difficulties ambulating for more than 60 or 70 feet  The patient describes progressive clumsiness in her legs the longer she stands and walks  It can take hours for the strength returned afterwards  Apparently vascular studies have been negative  She denies any difficulties with bowel bladder function though she has occasional urinary leakage  She denies any changing perineal sensation  Epidural steroid injections have not been helpful  "        ROS    Review of Systems   Constitutional:        Feeling poorly   HENT: Negative  Eyes: Negative  Respiratory: Negative  Cardiovascular: Negative  Gastrointestinal: Negative  Gastroparesis   Endocrine: Negative  Genitourinary: Positive for frequency and urgency  Leakage   Musculoskeletal: Positive for back pain (across lower back radiates into bilateral buttocks and legs)  Skin: Negative  Allergic/Immunologic: Negative  Neurological: Positive for weakness (bilateral legs), numbness (bilateral legs) and headaches  Negative for dizziness, seizures, syncope and light-headedness  Balance off, frequent falls   Hematological: Negative  Psychiatric/Behavioral: Positive for sleep disturbance (due to pain)  Negative for confusion         Family History    Family History   Problem Relation Age of Onset    Lung cancer Mother     Pulmonary embolism Father     Stroke Maternal Grandmother     Heart attack Maternal Grandfather     No Known Problems Paternal Grandmother     No Known Problems Paternal Grandfather        Social History    Social History     Social History    Marital status:      Spouse name: N/A    Number of children: N/A    Years of education: N/A     Occupational History    Not on file  Social History Main Topics    Smoking status: Former Smoker    Smokeless tobacco: Never Used    Alcohol use No    Drug use: No    Sexual activity: Not on file     Other Topics Concern    Not on file     Social History Narrative    No narrative on file       Past Medical History    Past Medical History:   Diagnosis Date    Acid reflux     Anxiety     Arthritis     Asthma     Chronic narcotic dependence (HCC)     Chronic pain     Colon polyp     Cystocele     Diabetes mellitus ([facility] [address] )     Diverticulosis     Fibromyalgia     Gastric ulcer     Gastroparesis     Hyperlipidemia     Hypertension     IBS (irritable bowel syndrome)     Kidney stone     Post laminectomy syndrome     Seasonal allergies     Spinal stenosis        Surgical History    Past Surgical History:   Procedure Laterality Date    APPENDECTOMY      CHOLECYSTECTOMY      ESOPHAGOGASTRODUODENOSCOPY N/A 9/28/2016    Procedure: ESOPHAGOGASTRODUODENOSCOPY (EGD); Surgeon: Valentina Harrell MD;  [facility]; Service:     HERNIA REPAIR      HYSTERECTOMY      LAMINECTOMY      LUMBAR LAMINECTOMY      NC COLONOSCOPY FLX DX W/COLLJ SPEC WHEN PFRMD N/A 3/2/2016    Procedure: EGD AND COLONOSCOPY;  Surgeon: Valentina Harrell MD;  [facility]; Service: Gastroenterology    NC DILATE ESOPHAGUS N/A 9/28/2016    Procedure: DILATATION ESOPHAGEAL;  Surgeon: Valentina Harrell MD;  [facility]; Service: Gastroenterology    NC ESOPHAGOGASTRODUODENOSCOPY TRANSORAL DIAGNOSTIC N/A 7/18/2016    Procedure: ESOPHAGOGASTRODUODENOSCOPY (EGD);   Surgeon: Valentina Harrell MD;  [facility]; Service: Gastroenterology       Medications      Current Outpatient Prescriptions:     Azilsartan Medoxomil (EDARBI) 40 MG TABS, Take by mouth daily  , Disp: , Rfl:     dicyclomine (BENTYL) 10 mg capsule, Take 1 capsule (10 mg total) by mouth 3 (three) times a day as needed (colon spasms) for up to 30 days, Disp: 90 capsule, Rfl: 2    fentaNYL (DURAGESIC) 50 mcg/hr, Place 1 patch on the skin every 3 (three) days Max Daily Amount: 1 patch, Disp: 5 patch, Rfl: 0    Lutein 10 MG TABS, Take by mouth daily  , Disp: , Rfl:     meloxicam (MOBIC) 15 mg tablet, Take 15 mg by mouth daily, Disp: , Rfl: 3    metFORMIN (GLUCOPHAGE) 1000 MG tablet, Take 1,000 mg by mouth 2 (two) times a day with meals, Disp: , Rfl:     metoclopramide (REGLAN) 5 mg tablet, Take 1 tablet (5 mg total) by mouth 4 (four) times a day for 30 days, Disp: 120 tablet, Rfl: 3    Milnacipran HCl (SAVELLA) 100 MG TABS, Take 1 tablet by mouth daily, Disp: , Rfl:     oxybutynin (DITROPAN XL) 15 MG 24 hr tablet, Take 1 tablet by mouth 2 (two) times a day, Disp: , Rfl:     oxyCODONE-acetaminophen (PERCOCET)  mg per tablet, Take 1 tablet by mouth every 6 (six) hours as needed for moderate pain Max Daily Amount: 4 tablets, Disp: 120 tablet, Rfl: 0    pitavastatin (LIVALO) 2 mg, Take 1 mg by mouth daily with dinner , Disp: , Rfl:     latanoprost (XALATAN) 0 005 % ophthalmic solution, ADMINISTER 1 DROP TO BOTH EYES DAILY AT BEDTIME FOR 30 DAYS, Disp: 2 5 mL, Rfl: 0    meclizine (ANTIVERT) 25 mg tablet, as needed  , Disp: , Rfl:     metaxalone (SKELAXIN) 800 mg tablet, Take 1 tablet (800 mg total) by mouth 3 (three) times a day for 30 days (Patient taking differently: Take 800 mg by mouth 3 (three) times a day as needed  ), Disp: 90 tablet, Rfl: 3    methylPREDNISolone (MEDROL) 32 MG tablet, Take 1 tablet (32 mg total) by mouth see administration instructions 12 hours prior to procedure  Then take one tablet by mouth 2 hours prior to procedure  , Disp: 2 tablet, Rfl: 0    pantoprazole (PROTONIX) 40 mg tablet, Take 1 tablet (40 mg total) by mouth 2 (two) times daily after meals for 30 days, Disp: 60 tablet, Rfl: 3    Allergies    Allergies   Allergen Reactions    Penicillins Anaphylaxis     Other reaction(s): Unknown Reaction    Sulfa Antibiotics Anaphylaxis     Other reaction(s): Unknown Reaction    Aspartame     Iodinated Diagnostic Agents Hives     Pt has taken prep prior for contrast and has not had any break through reaction    Other      Other reaction(s): Unknown Reaction  Contrast dye         The following portions of the patient's history were reviewed and updated as appropriate: allergies, current medications, past family history, past medical history, past social history, past surgical history and problem list     Investigations    I personally reviewed the CT results with the patient:    CT myelogram of the lumbar spine dated April 19th, 2018  Previous decompression at L4-5 and interbody grafts at L4-5  Multiple levels of degenerative disc disease and facet arthropathy  Severe stenosis at T12-L1, L1-2, L2-3 and L3-4 secondary to degenerative changes  There appears to be compression of the spinal cord/conus at T12-L1  Spinal cord stimulator electrode is noted at T9-T7 and is canted towards the left  No other areas of significant neural compression  Physical Exam    Vitals:  Blood pressure 160/68, pulse 105, temperature 98 2 °F (36 8 °C), resp  rate 16, height 5' (1 524 m), weight 77 3 kg (170 lb 6 4 oz)  ,Body mass index is 33 28 kg/m²  Physical Exam   Constitutional: She is oriented to person, place, and time  She appears well-developed and well-nourished  Cardiovascular: Normal rate, regular rhythm and normal heart sounds  Pulmonary/Chest: Effort normal and breath sounds normal    Musculoskeletal:        Lumbar back: She exhibits decreased range of motion  Midline lumbar incision well healed     Neurological: She is oriented to person, place, and time  Psychiatric: Her speech is normal  Her mood appears anxious  Patient is appropriately tearful upon discussion of surgery  Neurologic Exam     Mental Status   Oriented to person, place, and time  Speech: speech is normal     Motor Exam   Muscle bulk: normal5/5 power in lower extremities  Gait, Coordination, and Reflexes Walks with a slowed, stooped forward posture  Shuffling gait

## 2018-04-24 NOTE — LETTER
April 24, 2018     [facility]ayy Brewer [facility], [address] [facility]  [address] [facility]    Patient: Brett Alberto   YOB: 1945   Date of Visit: 4/24/2018       Dear Dr Charmayne Angel: Thank you for referring Sammie Benedict to me for evaluation  Below are my notes for this consultation  If you have questions, please do not hesitate to call me  I look forward to following your patient along with you  Sincerely,        Jarvis Taveras MD        CC: No Recipients  Jarvis Taveras MD  4/24/2018  3:10 PM  Sign at close encounter  Office Note - Neurosurgery   Brett Alberto 74 y o  female MRN: [id number]      Assessment:    Patient is gradually worsening  Symptoms, as detailed in HPI, continue to significantly impact of patient's quality of life in daily activities  After carefully considering presentation, investigations, functional status and co-morbidities, the risk/benefit profile of surgical intervention is favorable  57-year-old woman with progressive lumbar neurogenic claudication and possible myelopathy with thoracolumbar spinal stenosis from T12-L4  She also has a left buttock IPG for spinal cord stimulator which is at end of life requiring increasing charging time and more frequent charging  She is a candidate for 1  Reopening of lumbar incision for T11-L5 posterior instrumented fixation fusion and T12-L4 posterior decompression with possible extension to additional levels and 2  Removal of left buttock IPG and placement of new left buttock implantable pulse generator  The goal of surgery is to relieve pressure neural structures and hopefully improve radicular pain and symptoms of neurogenic claudication and stabilize symptoms of thoracic myelopathy  Weakness, numbness and back pain are less likely to improve  The risks of surgery were described in detail  1  Risk of general anesthetic with possible cardiac and respiratory complication   Risk of infection and bleeding  2  Risk of neurological injury with new pain, weakness or numbness in the legs or difficulties with bowel and bladder function  Risk of CSF leak was described  3  Possible need for additional lumbar spine surgery in the future  With respect to replacing the left buttock IPG, hopefully the new system with up-to-date programming may provide further improvement in managing her chronic lower extremity radiculopathy and pain  The risks of this procedure were detailed and largely include failure treatment or infection of the battery requiring removal of part or the entire spinal cord stimulator system  Expected postoperative course, including activity restrictions, expected pain and postoperative medication were reviewed  Patient provided verbal consent to surgical procedure and signed consent form: Yes    History, physical examination and diagnostic tests were reviewed and questions answered  Diagnosis, care plan and treatment options were discussed  The patient and family member patient and daughter understand instructions and will follow up as directed      Plan:    Follow-up:   Surgery    Problem List Items Addressed This Visit        Nervous and Auditory    Compression of spinal cord (HCC)    Relevant Orders    UA (URINE) with reflex to Microscopic    Type and screen    Comprehensive metabolic panel    CBC and differential    APTT    Protime-INR    HEMOGLOBIN A1C W/ EAG ESTIMATION    MRSA culture    EKG 12 lead       Other    Failed back surgical syndrome    Relevant Orders    Case request operating room: Reopening of lumbar incision for T11-L5 posterior instrumented fixation and fusion and T12 -L4 posterior decompression with possible extension to additional levels, removal of left buttock implantable pulse generator a  (Completed)    Ambulatory referral to Family Practice    UA (URINE) with reflex to Microscopic    Type and screen    Comprehensive metabolic panel    CBC and differential APTT    Protime-INR    HEMOGLOBIN A1C W/ EAG ESTIMATION    MRSA culture    EKG 12 lead    Status post lumbar spinal fusion - Primary    Relevant Orders    Case request operating room: Reopening of lumbar incision for T11-L5 posterior instrumented fixation and fusion and T12 -L4 posterior decompression with possible extension to additional levels, removal of left buttock implantable pulse generator a  (Completed)    Ambulatory referral to Family Practice    UA (URINE) with reflex to Microscopic    Type and screen    Comprehensive metabolic panel    CBC and differential    APTT    Protime-INR    HEMOGLOBIN A1C W/ EAG ESTIMATION    MRSA culture    EKG 12 lead    Lumbar stenosis with neurogenic claudication    Relevant Orders    Case request operating room: Reopening of lumbar incision for T11-L5 posterior instrumented fixation and fusion and T12 -L4 posterior decompression with possible extension to additional levels, removal of left buttock implantable pulse generator a  (Completed)    Ambulatory referral to Family Practice    UA (URINE) with reflex to Microscopic    Type and screen    Comprehensive metabolic panel    CBC and differential    APTT    Protime-INR    HEMOGLOBIN A1C W/ EAG ESTIMATION    MRSA culture    EKG 12 lead    Battery end of life of spinal cord stimulator    Relevant Orders    Case request operating room: Reopening of lumbar incision for T11-L5 posterior instrumented fixation and fusion and T12 -L4 posterior decompression with possible extension to additional levels, removal of left buttock implantable pulse generator a  (Completed)    Ambulatory referral to Family Practice    UA (URINE) with reflex to Microscopic    Type and screen    Comprehensive metabolic panel    CBC and differential    APTT    Protime-INR    HEMOGLOBIN A1C W/ EAG ESTIMATION    MRSA culture    EKG 12 lead      Other Visit Diagnoses     Pre-procedural examination        Relevant Orders    UA (URINE) with reflex to Microscopic    Type and screen    Comprehensive metabolic panel    CBC and differential    APTT    Protime-INR    HEMOGLOBIN A1C W/ EAG ESTIMATION    MRSA culture    EKG 12 lead          Subjective/Objective     Chief Complaint    Legs are weaker  HPI    Patient presents with her daughter today to review the results of recent myelogram of the lumbar spine  Since her last visit, she has had some progression of her difficulties with walking  She also noticed that it is taking longer period of time to charge the IPG for her spinal cord stimulator and that the charge runs down much quicker  She is still able to obtain coverage of her lower back and bilateral lower extremities however  Otherwise, her symptoms as reviewed below have not changed significantly  "43-year-old woman accompanied by her daughter today  She underwent lumbar fusion over 20 years ago  She has chronic pain and had a spinal cord stimulator placed approximately 5 years ago with improvement in her pain  This really has not  Changed and her pain medication is listed below  Of more concern however is a change in the patient's gait and difficulties ambulating for more than 60 or 70 feet  The patient describes progressive clumsiness in her legs the longer she stands and walks  It can take hours for the strength returned afterwards  Apparently vascular studies have been negative  She denies any difficulties with bowel bladder function though she has occasional urinary leakage  She denies any changing perineal sensation  Epidural steroid injections have not been helpful  "        ROS    Review of Systems   Constitutional:        Feeling poorly   HENT: Negative  Eyes: Negative  Respiratory: Negative  Cardiovascular: Negative  Gastrointestinal: Negative  Gastroparesis   Endocrine: Negative  Genitourinary: Positive for frequency and urgency          Leakage   Musculoskeletal: Positive for back pain (across lower back radiates into bilateral buttocks and legs)  Skin: Negative  Allergic/Immunologic: Negative  Neurological: Positive for weakness (bilateral legs), numbness (bilateral legs) and headaches  Negative for dizziness, seizures, syncope and light-headedness  Balance off, frequent falls   Hematological: Negative  Psychiatric/Behavioral: Positive for sleep disturbance (due to pain)  Negative for confusion  Family History    Family History   Problem Relation Age of Onset    Lung cancer Mother     Pulmonary embolism Father     Stroke Maternal Grandmother     Heart attack Maternal Grandfather     No Known Problems Paternal Grandmother     No Known Problems Paternal Grandfather        Social History    Social History     Social History    Marital status:      Spouse name: N/A    Number of children: N/A    Years of education: N/A     Occupational History    Not on file  Social History Main Topics    Smoking status: Former Smoker    Smokeless tobacco: Never Used    Alcohol use No    Drug use: No    Sexual activity: Not on file     Other Topics Concern    Not on file     Social History Narrative    No narrative on file       Past Medical History    Past Medical History:   Diagnosis Date    Acid reflux     Anxiety     Arthritis     Asthma     Chronic narcotic dependence (HCC)     Chronic pain     Colon polyp     Cystocele     Diabetes mellitus ([address] )     Diverticulosis     Fibromyalgia     Gastric ulcer     Gastroparesis     Hyperlipidemia     Hypertension     IBS (irritable bowel syndrome)     Kidney stone     Post laminectomy syndrome     Seasonal allergies     Spinal stenosis        Surgical History    Past Surgical History:   Procedure Laterality Date    APPENDECTOMY      CHOLECYSTECTOMY      ESOPHAGOGASTRODUODENOSCOPY N/A 9/28/2016    Procedure: ESOPHAGOGASTRODUODENOSCOPY (EGD); Surgeon: Tameka Todd MD;  [facility];   Service:     HERNIA REPAIR      HYSTERECTOMY      LAMINECTOMY      LUMBAR LAMINECTOMY      RI COLONOSCOPY FLX DX W/COLLJ SPEC WHEN PFRMD N/A 3/2/2016    Procedure: EGD AND COLONOSCOPY;  Surgeon: Marylene Alstrom, MD;  [facility]; Service: Gastroenterology    RI DILATE ESOPHAGUS N/A 9/28/2016    Procedure: DILATATION ESOPHAGEAL;  Surgeon: Marylene Alstrom, MD;  [facility]; Service: Gastroenterology    RI ESOPHAGOGASTRODUODENOSCOPY TRANSORAL DIAGNOSTIC N/A 7/18/2016    Procedure: ESOPHAGOGASTRODUODENOSCOPY (EGD); Surgeon: Marylene Alstrom, MD;  [facility]; Service: Gastroenterology       Medications      Current Outpatient Prescriptions:     Azilsartan Medoxomil (EDARBI) 40 MG TABS, Take by mouth daily  , Disp: , Rfl:     dicyclomine (BENTYL) 10 mg capsule, Take 1 capsule (10 mg total) by mouth 3 (three) times a day as needed (colon spasms) for up to 30 days, Disp: 90 capsule, Rfl: 2    fentaNYL (DURAGESIC) 50 mcg/hr, Place 1 patch on the skin every 3 (three) days Max Daily Amount: 1 patch, Disp: 5 patch, Rfl: 0    Lutein 10 MG TABS, Take by mouth daily  , Disp: , Rfl:     meloxicam (MOBIC) 15 mg tablet, Take 15 mg by mouth daily, Disp: , Rfl: 3    metFORMIN (GLUCOPHAGE) 1000 MG tablet, Take 1,000 mg by mouth 2 (two) times a day with meals, Disp: , Rfl:     metoclopramide (REGLAN) 5 mg tablet, Take 1 tablet (5 mg total) by mouth 4 (four) times a day for 30 days, Disp: 120 tablet, Rfl: 3    Milnacipran HCl (SAVELLA) 100 MG TABS, Take 1 tablet by mouth daily, Disp: , Rfl:     oxybutynin (DITROPAN XL) 15 MG 24 hr tablet, Take 1 tablet by mouth 2 (two) times a day, Disp: , Rfl:     oxyCODONE-acetaminophen (PERCOCET)  mg per tablet, Take 1 tablet by mouth every 6 (six) hours as needed for moderate pain Max Daily Amount: 4 tablets, Disp: 120 tablet, Rfl: 0    pitavastatin (LIVALO) 2 mg, Take 1 mg by mouth daily with dinner , Disp: , Rfl:     latanoprost (XALATAN) 0 005 % ophthalmic solution, ADMINISTER 1 DROP TO BOTH EYES DAILY AT BEDTIME FOR 30 DAYS, Disp: 2 5 mL, Rfl: 0    meclizine (ANTIVERT) 25 mg tablet, as needed  , Disp: , Rfl:     metaxalone (SKELAXIN) 800 mg tablet, Take 1 tablet (800 mg total) by mouth 3 (three) times a day for 30 days (Patient taking differently: Take 800 mg by mouth 3 (three) times a day as needed  ), Disp: 90 tablet, Rfl: 3    methylPREDNISolone (MEDROL) 32 MG tablet, Take 1 tablet (32 mg total) by mouth see administration instructions 12 hours prior to procedure  Then take one tablet by mouth 2 hours prior to procedure , Disp: 2 tablet, Rfl: 0    pantoprazole (PROTONIX) 40 mg tablet, Take 1 tablet (40 mg total) by mouth 2 (two) times daily after meals for 30 days, Disp: 60 tablet, Rfl: 3    Allergies    Allergies   Allergen Reactions    Penicillins Anaphylaxis     Other reaction(s): Unknown Reaction    Sulfa Antibiotics Anaphylaxis     Other reaction(s): Unknown Reaction    Aspartame     Iodinated Diagnostic Agents Hives     Pt has taken prep prior for contrast and has not had any break through reaction    Other      Other reaction(s): Unknown Reaction  Contrast dye         The following portions of the patient's history were reviewed and updated as appropriate: allergies, current medications, past family history, past medical history, past social history, past surgical history and problem list     Investigations    I personally reviewed the CT results with the patient:    CT myelogram of the lumbar spine dated April 19th, 2018  Previous decompression at L4-5 and interbody grafts at L4-5  Multiple levels of degenerative disc disease and facet arthropathy  Severe stenosis at T12-L1, L1-2, L2-3 and L3-4 secondary to degenerative changes  There appears to be compression of the spinal cord/conus at T12-L1  Spinal cord stimulator electrode is noted at T9-T7 and is canted towards the left  No other areas of significant neural compression      Physical Exam    Vitals:  Blood pressure 160/68, pulse 105, temperature 98 2 °F (36 8 °C), resp  rate 16, height 5' (1 524 m), weight 77 3 kg (170 lb 6 4 oz)  ,Body mass index is 33 28 kg/m²  Physical Exam   Constitutional: She is oriented to person, place, and time  She appears well-developed and well-nourished  Cardiovascular: Normal rate, regular rhythm and normal heart sounds  Pulmonary/Chest: Effort normal and breath sounds normal    Musculoskeletal:        Lumbar back: She exhibits decreased range of motion  Midline lumbar incision well healed  Neurological: She is oriented to person, place, and time  Psychiatric: Her speech is normal  Her mood appears anxious  Patient is appropriately tearful upon discussion of surgery  Neurologic Exam     Mental Status   Oriented to person, place, and time  Speech: speech is normal     Motor Exam   Muscle bulk: normal5/5 power in lower extremities  Gait, Coordination, and Reflexes Walks with a slowed, stooped forward posture  Shuffling gait

## 2018-04-30 DIAGNOSIS — M48.062 SPINAL STENOSIS OF LUMBAR REGION WITH NEUROGENIC CLAUDICATION: ICD-10-CM

## 2018-04-30 DIAGNOSIS — M48.061 SPINAL STENOSIS OF LUMBAR REGION, UNSPECIFIED WHETHER NEUROGENIC CLAUDICATION PRESENT: ICD-10-CM

## 2018-04-30 RX ORDER — FENTANYL 50 UG/H
1 PATCH TRANSDERMAL
Qty: 5 PATCH | Refills: 0 | Status: SHIPPED | OUTPATIENT
Start: 2018-04-30 | End: 2018-05-01 | Stop reason: SDUPTHER

## 2018-04-30 RX ORDER — OXYCODONE AND ACETAMINOPHEN 10; 325 MG/1; MG/1
1 TABLET ORAL EVERY 6 HOURS PRN
Qty: 120 TABLET | Refills: 0 | Status: SHIPPED | OUTPATIENT
Start: 2018-04-30 | End: 2018-05-31 | Stop reason: SDUPTHER

## 2018-05-01 DIAGNOSIS — H10.9 BACTERIAL CONJUNCTIVITIS: Primary | ICD-10-CM

## 2018-05-01 DIAGNOSIS — M48.061 SPINAL STENOSIS OF LUMBAR REGION, UNSPECIFIED WHETHER NEUROGENIC CLAUDICATION PRESENT: ICD-10-CM

## 2018-05-01 RX ORDER — TOBRAMYCIN AND DEXAMETHASONE 3; 1 MG/ML; MG/ML
1 SUSPENSION/ DROPS OPHTHALMIC 4 TIMES DAILY
Qty: 5 ML | Refills: 0 | Status: SHIPPED | OUTPATIENT
Start: 2018-05-01 | End: 2018-05-09 | Stop reason: ALTCHOICE

## 2018-05-01 RX ORDER — FENTANYL 50 UG/H
1 PATCH TRANSDERMAL
Qty: 10 PATCH | Refills: 0 | Status: SHIPPED | OUTPATIENT
Start: 2018-05-01 | End: 2018-05-31 | Stop reason: SDUPTHER

## 2018-05-09 DIAGNOSIS — J01.90 ACUTE NON-RECURRENT SINUSITIS, UNSPECIFIED LOCATION: Primary | ICD-10-CM

## 2018-05-09 RX ORDER — CEFUROXIME AXETIL 500 MG/1
500 TABLET ORAL EVERY 12 HOURS SCHEDULED
Qty: 20 TABLET | Refills: 0 | Status: SHIPPED | OUTPATIENT
Start: 2018-05-09 | End: 2018-05-19

## 2018-05-11 ENCOUNTER — TRANSCRIBE ORDERS (OUTPATIENT)
Dept: LAB | Facility: CLINIC | Age: 73
End: 2018-05-11

## 2018-05-11 ENCOUNTER — APPOINTMENT (OUTPATIENT)
Dept: LAB | Facility: CLINIC | Age: 73
End: 2018-05-11
Payer: COMMERCIAL

## 2018-05-11 DIAGNOSIS — Z98.1 STATUS POST LUMBAR SPINAL FUSION: Primary | ICD-10-CM

## 2018-05-11 DIAGNOSIS — M96.1 FAILED BACK SURGICAL SYNDROME: ICD-10-CM

## 2018-05-11 DIAGNOSIS — Z01.818 PRE-PROCEDURAL EXAMINATION: ICD-10-CM

## 2018-05-11 DIAGNOSIS — Z45.42 BATTERY END OF LIFE OF SPINAL CORD STIMULATOR: ICD-10-CM

## 2018-05-11 DIAGNOSIS — G95.20 COMPRESSION OF SPINAL CORD (HCC): ICD-10-CM

## 2018-05-11 DIAGNOSIS — Z98.1 STATUS POST LUMBAR SPINAL FUSION: ICD-10-CM

## 2018-05-11 DIAGNOSIS — M48.062 LUMBAR STENOSIS WITH NEUROGENIC CLAUDICATION: ICD-10-CM

## 2018-05-11 LAB
ABO GROUP BLD: NORMAL
ALBUMIN SERPL BCP-MCNC: 3.5 G/DL (ref 3.5–5)
ALP SERPL-CCNC: 92 U/L (ref 46–116)
ALT SERPL W P-5'-P-CCNC: 22 U/L (ref 12–78)
ANION GAP SERPL CALCULATED.3IONS-SCNC: 10 MMOL/L (ref 4–13)
APTT PPP: 26 SECONDS (ref 23–35)
AST SERPL W P-5'-P-CCNC: 14 U/L (ref 5–45)
BASOPHILS # BLD AUTO: 0.03 THOUSANDS/ΜL (ref 0–0.1)
BASOPHILS NFR BLD AUTO: 0 % (ref 0–1)
BILIRUB SERPL-MCNC: 0.2 MG/DL (ref 0.2–1)
BILIRUB UR QL STRIP: NEGATIVE
BLD GP AB SCN SERPL QL: NEGATIVE
BUN SERPL-MCNC: 14 MG/DL (ref 5–25)
CALCIUM SERPL-MCNC: 9 MG/DL (ref 8.3–10.1)
CHLORIDE SERPL-SCNC: 105 MMOL/L (ref 100–108)
CLARITY UR: CLEAR
CO2 SERPL-SCNC: 26 MMOL/L (ref 21–32)
COLOR UR: YELLOW
CREAT SERPL-MCNC: 0.89 MG/DL (ref 0.6–1.3)
EOSINOPHIL # BLD AUTO: 0.26 THOUSAND/ΜL (ref 0–0.61)
EOSINOPHIL NFR BLD AUTO: 3 % (ref 0–6)
ERYTHROCYTE [DISTWIDTH] IN BLOOD BY AUTOMATED COUNT: 17.2 % (ref 11.6–15.1)
EST. AVERAGE GLUCOSE BLD GHB EST-MCNC: 171 MG/DL
GFR SERPL CREATININE-BSD FRML MDRD: 64 ML/MIN/1.73SQ M
GLUCOSE SERPL-MCNC: 143 MG/DL (ref 65–140)
GLUCOSE UR STRIP-MCNC: NEGATIVE MG/DL
HBA1C MFR BLD: 7.6 % (ref 4.2–6.3)
HCT VFR BLD AUTO: 25.8 % (ref 34.8–46.1)
HGB BLD-MCNC: 7.3 G/DL (ref 11.5–15.4)
HGB UR QL STRIP.AUTO: NEGATIVE
INR PPP: 0.89 (ref 0.86–1.16)
KETONES UR STRIP-MCNC: ABNORMAL MG/DL
LEUKOCYTE ESTERASE UR QL STRIP: NEGATIVE
LYMPHOCYTES # BLD AUTO: 1.57 THOUSANDS/ΜL (ref 0.6–4.47)
LYMPHOCYTES NFR BLD AUTO: 20 % (ref 14–44)
MCH RBC QN AUTO: 19.4 PG (ref 26.8–34.3)
MCHC RBC AUTO-ENTMCNC: 28.3 G/DL (ref 31.4–37.4)
MCV RBC AUTO: 68 FL (ref 82–98)
MONOCYTES # BLD AUTO: 0.84 THOUSAND/ΜL (ref 0.17–1.22)
MONOCYTES NFR BLD AUTO: 11 % (ref 4–12)
NEUTROPHILS # BLD AUTO: 5.03 THOUSANDS/ΜL (ref 1.85–7.62)
NEUTS SEG NFR BLD AUTO: 66 % (ref 43–75)
NITRITE UR QL STRIP: NEGATIVE
PH UR STRIP.AUTO: 5.5 [PH] (ref 4.5–8)
PLATELET # BLD AUTO: 399 THOUSANDS/UL (ref 149–390)
PMV BLD AUTO: 8.8 FL (ref 8.9–12.7)
POTASSIUM SERPL-SCNC: 4 MMOL/L (ref 3.5–5.3)
PROT SERPL-MCNC: 7.1 G/DL (ref 6.4–8.2)
PROT UR STRIP-MCNC: NEGATIVE MG/DL
PROTHROMBIN TIME: 12.3 SECONDS (ref 12.1–14.4)
RBC # BLD AUTO: 3.77 MILLION/UL (ref 3.81–5.12)
RH BLD: POSITIVE
SODIUM SERPL-SCNC: 141 MMOL/L (ref 136–145)
SP GR UR STRIP.AUTO: 1.02 (ref 1–1.03)
SPECIMEN EXPIRATION DATE: NORMAL
UROBILINOGEN UR QL STRIP.AUTO: 0.2 E.U./DL
WBC # BLD AUTO: 7.73 THOUSAND/UL (ref 4.31–10.16)

## 2018-05-11 PROCEDURE — 83036 HEMOGLOBIN GLYCOSYLATED A1C: CPT

## 2018-05-11 PROCEDURE — 87081 CULTURE SCREEN ONLY: CPT

## 2018-05-11 PROCEDURE — 80053 COMPREHEN METABOLIC PANEL: CPT

## 2018-05-11 PROCEDURE — 85730 THROMBOPLASTIN TIME PARTIAL: CPT

## 2018-05-11 PROCEDURE — 36415 COLL VENOUS BLD VENIPUNCTURE: CPT

## 2018-05-11 PROCEDURE — 86901 BLOOD TYPING SEROLOGIC RH(D): CPT | Performed by: NEUROLOGICAL SURGERY

## 2018-05-11 PROCEDURE — 85610 PROTHROMBIN TIME: CPT

## 2018-05-11 PROCEDURE — 86850 RBC ANTIBODY SCREEN: CPT | Performed by: NEUROLOGICAL SURGERY

## 2018-05-11 PROCEDURE — 81003 URINALYSIS AUTO W/O SCOPE: CPT | Performed by: NEUROLOGICAL SURGERY

## 2018-05-11 PROCEDURE — 86900 BLOOD TYPING SEROLOGIC ABO: CPT | Performed by: NEUROLOGICAL SURGERY

## 2018-05-11 PROCEDURE — 85025 COMPLETE CBC W/AUTO DIFF WBC: CPT

## 2018-05-12 LAB — MRSA NOSE QL CULT: NORMAL

## 2018-05-17 ENCOUNTER — OFFICE VISIT (OUTPATIENT)
Dept: FAMILY MEDICINE CLINIC | Facility: CLINIC | Age: 73
End: 2018-05-17
Payer: COMMERCIAL

## 2018-05-17 VITALS
BODY MASS INDEX: 34.75 KG/M2 | DIASTOLIC BLOOD PRESSURE: 68 MMHG | HEIGHT: 60 IN | HEART RATE: 112 BPM | WEIGHT: 177 LBS | SYSTOLIC BLOOD PRESSURE: 152 MMHG

## 2018-05-17 DIAGNOSIS — I44.7 LEFT BUNDLE BRANCH BLOCK: ICD-10-CM

## 2018-05-17 DIAGNOSIS — Z01.818 PRE-OP TESTING: ICD-10-CM

## 2018-05-17 DIAGNOSIS — D64.9 ANEMIA, UNSPECIFIED TYPE: Primary | ICD-10-CM

## 2018-05-17 PROCEDURE — 99214 OFFICE O/P EST MOD 30 MIN: CPT | Performed by: NURSE PRACTITIONER

## 2018-05-17 PROCEDURE — 93000 ELECTROCARDIOGRAM COMPLETE: CPT | Performed by: NURSE PRACTITIONER

## 2018-05-17 RX ORDER — METOCLOPRAMIDE 5 MG/1
5 TABLET ORAL 3 TIMES DAILY
COMMUNITY
End: 2018-06-13 | Stop reason: SDUPTHER

## 2018-05-17 NOTE — PROGRESS NOTES
Patient ID: Brandie Andersen is a 74 y o  female  HPI: 74 y  o female is being seen for a preoperative visit for a reopening of lumbar incision for T11-L5 posterior instrumented fixation and fusion and T12 -L4 posterior decompression with possible extension to additional levels removal of left buttock implantable pulse generator and placement of new  implantable pulse generator surgery on 06/04/18 by Dr Anish Cheng  Surgical Risk Assessment:    Prior anesthesia: Adverse Reaction to : No past reactions to epidural or spinal anesthesia Patient reports that it as in the past taken an extended period to completely clear general anesthesia  Family history of adverse reactions to anesthesia? no    Pertinent Past Medical History:  None       Exercise Capacity:     Able to walk 4 blocks w/o Sx:   Limited due to muscle weakness from lumbar spinal stenosis  Patient does not become short of breath or develop chest pain when ambulating in office       Lifestyle Factors: Tobacco Use:  Former smoker  quit 35 years ago        Pack years  Alcohol Use:  no  Illicit Drug Use:  No   No transfusions : no restrictions due to [employer] or cultural beliefs    ULISSES Risk Factors:    No reported episodes of snoring, periods of not breathing, excessive daytime sleepiness    Personal history of venous thromboembolic disease:    History of Steroid use for >2 weeks within last year? Family History   Problem Relation Age of Onset    Lung cancer Mother     Pulmonary embolism Father     Stroke Maternal Grandmother     Heart attack Maternal Grandfather     No Known Problems Paternal Grandmother     No Known Problems Paternal Grandfather     Diabetes Family      Diabetes mellitus    Hypertension Family     Stroke Family      Stroke complications     Social History     Social History    Marital status:       Spouse name: N/A    Number of children: N/A    Years of education: N/A     Occupational History    Retired Social History Main Topics    Smoking status: Former Smoker    Smokeless tobacco: Never Used      Comment: Denied history of current ever day smoker, Former smoker and Never smoker all documented in [employer]    Alcohol use No      Comment: Denied history of alcohol use    Drug use: No      Comment: Denied history of drug use    Sexual activity: Not on file     Other Topics Concern    Not on file     Social History Narrative    Marital History - Currently  per [employer]         Past Medical History:   Diagnosis Date    Acid reflux     Anxiety     Arthritis     Asthma     Chronic narcotic dependence ([facility] )     Chronic pain     Colon polyp     Cystocele     Diabetes mellitus ([facility] )     Diverticulosis     Dysfunctional uterine bleeding     last assessed - [id number]    Fibromyalgia     Gastric ulcer     Gastroparesis     History of colonic polyps     last assessed - [id number]    History of gastroesophageal reflux (GERD)     Hypercholesterolemia     Hyperlipidemia     Hypertension     IBS (irritable bowel syndrome)     Kidney stone     Post laminectomy syndrome     Seasonal allergies     Spinal stenosis      Past Surgical History:   Procedure Laterality Date    APPENDECTOMY      CHOLECYSTECTOMY      ESOPHAGOGASTRODUODENOSCOPY N/A 9/28/2016    Procedure: ESOPHAGOGASTRODUODENOSCOPY (EGD); Surgeon: Connor Bergman MD;  [facility]; Service:     HERNIA REPAIR      HYSTERECTOMY      TTAH-BSO last assessed - [id number]    LAMINECTOMY      LUMBAR LAMINECTOMY      SD COLONOSCOPY FLX DX W/COLLJ SPEC WHEN PFRMD N/A 3/2/2016    Procedure: EGD AND COLONOSCOPY;  Surgeon: Connor Bergman MD;  [facility]; Service: Gastroenterology    SD DILATE ESOPHAGUS N/A 9/28/2016    Procedure: DILATATION ESOPHAGEAL;  Surgeon: Connor Bergman MD;  [facility];   Service: Gastroenterology    SD ESOPHAGOGASTRODUODENOSCOPY TRANSORAL DIAGNOSTIC N/A 7/18/2016    Procedure: ESOPHAGOGASTRODUODENOSCOPY (EGD); Surgeon: Lea Romero MD;  [facility]; Service: Gastroenterology     Allergies   Allergen Reactions    Penicillins Anaphylaxis     Other reaction(s): Unknown Reaction    Sulfa Antibiotics Anaphylaxis     Other reaction(s): Unknown Reaction    Aspartame     Iodinated Diagnostic Agents Hives     Pt has taken prep prior for contrast and has not had any break through reaction    Other      Other reaction(s): Unknown Reaction  Contrast dye         Current Outpatient Prescriptions:     Azilsartan Medoxomil (EDARBI) 40 MG TABS, Take by mouth daily  , Disp: , Rfl:     cefuroxime (CEFTIN) 500 mg tablet, Take 1 tablet (500 mg total) by mouth every 12 (twelve) hours for 10 days, Disp: 20 tablet, Rfl: 0    dicyclomine (BENTYL) 10 mg capsule, Take 1 capsule (10 mg total) by mouth 3 (three) times a day as needed (colon spasms) for up to 30 days, Disp: 90 capsule, Rfl: 2    fentaNYL (DURAGESIC) 50 mcg/hr, Place 1 patch on the skin every 3 (three) days Max Daily Amount: 1 patch, Disp: 10 patch, Rfl: 0    latanoprost (XALATAN) 0 005 % ophthalmic solution, ADMINISTER 1 DROP TO BOTH EYES DAILY AT BEDTIME FOR 30 DAYS, Disp: 2 5 mL, Rfl: 0    Lutein 10 MG TABS, Take by mouth daily  , Disp: , Rfl:     meclizine (ANTIVERT) 25 mg tablet, as needed  , Disp: , Rfl:     meloxicam (MOBIC) 15 mg tablet, Take 15 mg by mouth daily, Disp: , Rfl: 3    metaxalone (SKELAXIN) 800 mg tablet, Take 1 tablet (800 mg total) by mouth 3 (three) times a day for 30 days (Patient taking differently: Take 800 mg by mouth 3 (three) times a day as needed  ), Disp: 90 tablet, Rfl: 3    metFORMIN (GLUCOPHAGE) 1000 MG tablet, Take 1,000 mg by mouth 2 (two) times a day with meals, Disp: , Rfl:     Milnacipran HCl (SAVELLA) 100 MG TABS, Take 1 tablet by mouth daily, Disp: , Rfl:     oxybutynin (DITROPAN XL) 15 MG 24 hr tablet, Take 1 tablet by mouth 2 (two) times a day, Disp: , Rfl:    oxyCODONE-acetaminophen (PERCOCET)  mg per tablet, Take 1 tablet by mouth every 6 (six) hours as needed for moderate pain Max Daily Amount: 4 tablets, Disp: 120 tablet, Rfl: 0    pantoprazole (PROTONIX) 40 mg tablet, Take 1 tablet (40 mg total) by mouth 2 (two) times daily after meals for 30 days, Disp: 60 tablet, Rfl: 3    pitavastatin (LIVALO) 2 mg, Take 1 mg by mouth daily with dinner , Disp: , Rfl:     Review of Systems   Constitutional:  Denies fever, chills, fatigue, weakness, weight loss, weight gain     ENT: Denies earache, loss of hearing, nosebleed, nasal discharge, nasal congestion, sore throat, hoarseness  Pulmonary: Denies shortness of breath, cough, dyspnea on exertion, orthopnea, PND   Cardiovascular:  Denies bradycardia , tachycardia  ,palpations, lower extremity edema leg, claudication  Abdomen:  Denies abdominal pain, anorexia, indigestion, nausea, vomiting, constipation, diarrhea  Musculoskeletal: Positive for bilateral lower extremity weakness with use of a quad cane for balance and mobility supportand lower back pains  Skin: Denies skin rash, skin lesion, skin wound, itching, dry skin  Neuro: Bilateral lower extremity numbness   Denies headache, tingling, confusion, loss of consciousness, dizziness, vertigo  Psychiatric: Positive sleep disturbances   Denies feelings of depression, suicidal ideation, anxiety        Physical Exam:  Constitutional:   NAD, well appearing and well nourished      ENT:   Conjunctiva and lids: no injection, edema, or discharge     Pupils and iris: JAISON bilaterally    External inspection of ears and nose: normal without deformities or discharge  Otoscopic exam: Canals patent without erythema  Nasal mucosa, septum and turbinates: Normal or edema or discharge         Oropharynx:  Moist mucosa, normal tongue and tonsils without lesions  No erythema        Pulmonary:Respiratory effort normal rate and rhythm, no increased work of breathing   Auscultation of lungs:  Clear bilaterally with no adventitious breath sounds       Cardiovascular: regular rate and rhythm, S1 and S2, no murmur, no edema and/or varicosities of LE      Abdomen: Soft and non-distended     Positive bowel sounds      No heptomegaly or splenomegaly      Lymphatic:  No anterior or posterior cervical lymphadenopathy         Musculoskeletal:  Gait and station: Antaglic gait    Digits and nails normal without clubbing or cyanosis       Inspection/palpation of joints, bones, and muscles:  Decreased range of motion    Skin: Normal skin turgor and no rashes     Psych:   alert and oriented to person, place and time  normal mood and affect       DATA:  Laboratory Results:   Lab Results   Component Value Date    ALT 22 05/11/2018    AST 14 05/11/2018    BUN 14 05/11/2018    CALCIUM 9 0 05/11/2018     05/11/2018    CHOL 169 11/20/2017    CO2 26 05/11/2018    CREATININE 0 89 05/11/2018    HDL 50 11/20/2017    HCT 25 8 (L) 05/11/2018    HGB 7 3 (L) 05/11/2018    HGBA1C 7 6 (H) 05/11/2018     (H) 05/11/2018    K 4 0 05/11/2018     05/11/2018    TRIG 136 11/20/2017    WBC 7 73 05/11/2018     ECG: NSR; left axis deviation and LBBB - patient scheduled for Nuclear Medicine Stress Test on 05/21/18 at noon    Patient Clearance:Risk Estimation: per the Revised Cardiac Risk Index (Circ  [id number], 1999): the patient's risk factors for cardiac complications include:   RCI Risk Class: II (0 9% Risk of Major Cardiac Event)    Pre-op Evaluation Plan  1  Further preoperative work-up as follows: Nuclear Stress Test; TIBC: Ferratin, Vitamin B-12 level and Folate  2  Medication Management/Recommendations: Hold Metformin 48 hours prior and 48 hours after surgery; hold meloxicam as directed by surgeon's office      Clearance:  Patient is CLEARED for surgery pending additional cardiac testing      Assessment/Plan:  Diagnoses and all orders for this visit:    Anemia, unspecified type  -     Ferritin;  Future  - Vitamin B12; Future  -     Folate; Future  -     TIBC; Future    Left bundle branch block  -     NM myocardial perfusion spect (rx stress and/or rest); Future    Pre-op testing  -     POCT ECG    Other orders  -     Cancel: POCT ECG  -     metoclopramide (REGLAN) 5 mg tablet;  Take 5 mg by mouth 3 (three) times a day

## 2018-05-21 ENCOUNTER — HOSPITAL ENCOUNTER (OUTPATIENT)
Dept: NON INVASIVE DIAGNOSTICS | Facility: CLINIC | Age: 73
Discharge: HOME | End: 2018-05-21
Payer: COMMERCIAL

## 2018-05-21 DIAGNOSIS — I44.7 LEFT BUNDLE BRANCH BLOCK: ICD-10-CM

## 2018-05-21 PROCEDURE — 93017 CV STRESS TEST TRACING ONLY: CPT

## 2018-05-21 PROCEDURE — A9502 TC99M TETROFOSMIN: HCPCS

## 2018-05-21 PROCEDURE — 78452 HT MUSCLE IMAGE SPECT MULT: CPT

## 2018-05-21 RX ADMIN — REGADENOSON 0.4 MG: 0.08 INJECTION, SOLUTION INTRAVENOUS at 14:25

## 2018-05-22 ENCOUNTER — APPOINTMENT (OUTPATIENT)
Dept: LAB | Facility: CLINIC | Age: 73
End: 2018-05-22
Payer: COMMERCIAL

## 2018-05-22 DIAGNOSIS — D64.9 ANEMIA, UNSPECIFIED TYPE: ICD-10-CM

## 2018-05-22 LAB
CHEST PAIN STATEMENT: NORMAL
FERRITIN SERPL-MCNC: 6 NG/ML (ref 8–388)
FOLATE SERPL-MCNC: 13.4 NG/ML (ref 3.1–17.5)
MAX DIASTOLIC BP: 66 MMHG
MAX HEART RATE: 112 BPM
MAX PREDICTED HEART RATE: 145 BPM
MAX. SYSTOLIC BP: 138 MMHG
PROTOCOL NAME: NORMAL
REASON FOR TERMINATION: NORMAL
TARGET HR FORMULA: NORMAL
TEST INDICATION: NORMAL
TIBC SERPL-MCNC: 439 UG/DL (ref 250–450)
TIME IN EXERCISE PHASE: NORMAL
VIT B12 SERPL-MCNC: 610 PG/ML (ref 100–900)

## 2018-05-22 PROCEDURE — 82746 ASSAY OF FOLIC ACID SERUM: CPT

## 2018-05-22 PROCEDURE — 83550 IRON BINDING TEST: CPT

## 2018-05-22 PROCEDURE — 82728 ASSAY OF FERRITIN: CPT

## 2018-05-22 PROCEDURE — 36415 COLL VENOUS BLD VENIPUNCTURE: CPT

## 2018-05-22 PROCEDURE — 82607 VITAMIN B-12: CPT

## 2018-05-24 ENCOUNTER — TELEPHONE (OUTPATIENT)
Dept: FAMILY MEDICINE CLINIC | Facility: CLINIC | Age: 73
End: 2018-05-24

## 2018-05-24 DIAGNOSIS — M19.90 OSTEOARTHRITIS, UNSPECIFIED OSTEOARTHRITIS TYPE, UNSPECIFIED SITE: Primary | ICD-10-CM

## 2018-05-24 RX ORDER — MELOXICAM 15 MG/1
15 TABLET ORAL DAILY
Qty: 30 TABLET | Refills: 3 | Status: SHIPPED | OUTPATIENT
Start: 2018-05-24 | End: 2018-06-08 | Stop reason: HOSPADM

## 2018-05-24 NOTE — TELEPHONE ENCOUNTER
Dr Rogel's office called stating they need an addendum from her Pre-Op Clearance  Patient had an stress test done  The addendum needs to state if the stress test was normal or not and if the patient is cleared also the addendum needs to state the A1C above 7 is controlled

## 2018-05-30 ENCOUNTER — ANESTHESIA EVENT (OUTPATIENT)
Dept: PERIOP | Facility: HOSPITAL | Age: 73
DRG: 460 | End: 2018-05-30
Payer: COMMERCIAL

## 2018-05-31 DIAGNOSIS — M48.061 SPINAL STENOSIS OF LUMBAR REGION, UNSPECIFIED WHETHER NEUROGENIC CLAUDICATION PRESENT: ICD-10-CM

## 2018-05-31 DIAGNOSIS — M48.062 SPINAL STENOSIS OF LUMBAR REGION WITH NEUROGENIC CLAUDICATION: ICD-10-CM

## 2018-05-31 RX ORDER — OXYCODONE AND ACETAMINOPHEN 10; 325 MG/1; MG/1
1 TABLET ORAL EVERY 6 HOURS PRN
Qty: 120 TABLET | Refills: 0 | Status: SHIPPED | OUTPATIENT
Start: 2018-05-31 | End: 2018-05-31 | Stop reason: SDUPTHER

## 2018-05-31 RX ORDER — FENTANYL 50 UG/H
1 PATCH TRANSDERMAL
Qty: 10 PATCH | Refills: 0 | Status: SHIPPED | OUTPATIENT
Start: 2018-05-31 | End: 2018-07-17 | Stop reason: SDUPTHER

## 2018-05-31 RX ORDER — OXYCODONE AND ACETAMINOPHEN 10; 325 MG/1; MG/1
1 TABLET ORAL EVERY 6 HOURS PRN
Qty: 120 TABLET | Refills: 0 | Status: SHIPPED | OUTPATIENT
Start: 2018-05-31 | End: 2018-06-08 | Stop reason: HOSPADM

## 2018-05-31 RX ORDER — LEVOTHYROXINE SODIUM 0.03 MG/1
25 TABLET ORAL DAILY
COMMUNITY
End: 2020-03-04 | Stop reason: SDUPTHER

## 2018-05-31 RX ORDER — FENTANYL 50 UG/H
1 PATCH TRANSDERMAL
Qty: 10 PATCH | Refills: 0 | Status: SHIPPED | OUTPATIENT
Start: 2018-05-31 | End: 2018-05-31 | Stop reason: SDUPTHER

## 2018-05-31 NOTE — PRE-PROCEDURE INSTRUCTIONS
Pre-Surgery Instructions:   Medication Instructions    Azilsartan Medoxomil (EDARBI) 40 MG TABS Instructed patient per Anesthesia Guidelines  LAST DOSE 6/2    dicyclomine (BENTYL) 10 mg capsule Instructed patient per Anesthesia Guidelines  LAST DOSE 6/3    fentaNYL (DURAGESIC) 50 mcg/hr Instructed patient per Anesthesia Guidelines  LAST DOSE 6/3    IRON PO Instructed patient per Anesthesia Guidelines  LAST DOS 6/3    latanoprost (XALATAN) 0 005 % ophthalmic solution Instructed patient per Anesthesia Guidelines  LAST DOSE 6/3    levothyroxine 25 mcg tablet Instructed patient per Anesthesia Guidelines  TAKE DOS     Lutein 10 MG TABS Instructed patient per Anesthesia Guidelines  LAST DOSE 5/31    meclizine (ANTIVERT) 25 mg tablet Instructed patient per Anesthesia Guidelines  LAST DOSE 6/3    meloxicam (MOBIC) 15 mg tablet Instructed patient per Anesthesia Guidelines  LAST DOSE 5/31    metaxalone (SKELAXIN) 800 mg tablet Instructed patient per Anesthesia Guidelines  LAST DOSE 6/3    metFORMIN (GLUCOPHAGE) 1000 MG tablet Instructed patient per Anesthesia Guidelines  LAST DOSE 6/3    metoclopramide (REGLAN) 5 mg tablet Instructed patient per Anesthesia Guidelines  LAST DOSE 6/3    Milnacipran HCl (SAVELLA) 100 MG TABS Instructed patient per Anesthesia Guidelines  LAST DOSE 6/3    oxybutynin (DITROPAN XL) 15 MG 24 hr tablet Instructed patient per Anesthesia Guidelines  LAST DOSE 6/3    oxyCODONE-acetaminophen (PERCOCET)  mg per tablet Instructed patient per Anesthesia Guidelines  MAY HAVE AM OF SX IF NEEDED    pantoprazole (PROTONIX) 40 mg tablet Instructed patient per Anesthesia Guidelines  TAKE DOS     pitavastatin (LIVALO) 2 mg Instructed patient per Anesthesia Guidelines  LAST DOSE 6/3 PM     ACE/ARB Med Class     Do not take this medication the day before and the morning of the day of surgery/procedure  Acetaminophen Med Class     Continue to take this medication on your normal schedule    If this is an oral medication and you take it in the morning, then you may take this medicine with a sip of water  Herbal Med Class     Stop taking this herbal medications at least one week prior to surgery/procedure  Insulin Med Class     Pre-Surgery/Procedure Instructions for Adult Patients who Take Medicine for Diabetes or to Control their Blood Sugar     Day Before Surgery/Procedure  Use the directions based on the type of medicine you take for your diabetes  1  If you are having a procedure that does not require a bowel prep:  ? Pre-Mixed Insulin (Intermediate Acting: Humalog 75/25, Humulin 70/30  Novolog 70/30, Regular Insulin)  § Take ½ your regular dose the evening before your procedure  ? Rapid/Fast Acting Insulin/Long Acting Insulin (Humalog U200, NovoLog, Apidra, Lantus, Levemir, Joseph Laity, [place])  § Take your FULL regular dose the day before procedure  ? Oral Diabetic Medicines including Glipizide/Glimepiride/Glucotrol (sulfonylurea)  § Take your regular dose with dinner the evening before your procedure  2  If you are having a procedure (e g  Colonoscopy) that requires a bowel prep and you are allowed to have at least a clear liquid diet:  ? Pre-Mixed Insulin (Intermediate Acting: Humalog 75/25, Humulin 70/30, Novolog 70/30, Regular Insulin)  § Take ½ your regular dose the evening before your procedure  ? Rapid/Fast Acting Insulin (Humalog U200, NovoLog, Apidra, Fiasp)  § Take ½ your regular dose the evening before your procedure  ? Long Acting Insulin (Lantus, Levemir, Joseph Laity)  § Take your FULL regular dose the day before procedure  ? Oral Glipizide/Glimepiride/Glucotrol (sulfonylurea)  § Take ½ your regular dose the evening before your procedure  ? Oral Diabetic Medicines that are NOT Glipizide/Glimepiride/Glucotrol  § Take your regular dose with dinner in the evening before your procedure      Day of Surgery/Procedure  · Long Acting Insulin (Lantus, Levemir, Joseph Laity)  ?  If you usually take your Long-Acting Insulin in the morning, take the full dose as scheduled  · With the exception of the morning Long-Acting Insulin noted above, DO NOT take ANY diabetic medicine on the day of your procedure unless you were instructed by the doctor who manages your diabetic medicines  · Continue to check your blood sugars  · If you have an insulin pump then consult with your Endocrinologist for instructions  · If you cannot see your Endocrinologist, on the day of the procedure set your insulin pump to your basal rate only  Please bring your insulin pump supplies to the hospital      This Educational material has been approved by the Patient Education Advisory Committee  Date prepared: 1/17/2018          Expiration date: 1/17/2019        Approval Number:                     NSAID Med Class     Stop taking this medication at least 3 days prior to surgery/procedure  Opioid Med Class     Continue to take this medication on your normal schedule  If this is an oral medication and you take it in the morning, then you may take this medicine with a sip of water  Statin Med Class     Continue to take this medication on your normal schedule  If this is an oral medication and you take it in the morning, then you may take this medicine with a sip of water  Thyroxine Med Class     Continue to take this medication on your normal schedule  If this is an oral medication and you take it in the morning, then you may take this medicine with a sip of water    Urinary antispasmodics Med Class     Continue this medication up to the evening before surgery/procedure, but do not take the morning of the day of surgery

## 2018-06-01 ENCOUNTER — DOCUMENTATION (OUTPATIENT)
Dept: NEUROSURGERY | Facility: CLINIC | Age: 73
End: 2018-06-01

## 2018-06-01 NOTE — ANESTHESIA PREPROCEDURE EVALUATION
Review of Systems/Medical History  Patient summary reviewed  Chart reviewed  No history of anesthetic complications     Cardiovascular  EKG reviewed, Exercise tolerance (METS): <4, Exercise comment: Limited by back pain; does not get SOB, chest pain, or tightness  Hyperlipidemia, Hypertension ,   Comment: Normal stress test on 5/21/18,  Pulmonary  Asthma , well controlled/ stable Last rescue: < 1 year ago Asthma type of rescue: PRN inhaler,        GI/Hepatic    GERD well controlled,   Comment: Gastroparesis       Negative  ROS        Endo/Other  Diabetes (A1C 7 6 on 5/11/18) poorly controlled type 2 Oral agent, History of thyroid disease , hypothyroidism,   Obesity (BMI 31)    GYN       Hematology  Anemia ,     Musculoskeletal  Back pain (w/ b/l lumbar radiculopathy) , lumbar pain and spinal stenosis,   Arthritis     Neurology  Negative neurology ROS      Psychology   Anxiety,   Chronic opioid dependence Chronic pain,            Physical Exam    Airway    Mallampati score: III  TM Distance: >3 FB  Neck ROM: full     Dental   No notable dental hx lower dentures and upper dentures,     Cardiovascular  Rhythm: regular, Rate: normal, Cardiovascular exam normal    Pulmonary  Pulmonary exam normal Breath sounds clear to auscultation,     Other Findings        Anesthesia Plan  ASA Score- 3     Anesthesia Type- general with ASA Monitors  Additional Monitors: arterial line  Airway Plan: ETT  Comment: Pain meds: fentanyl patch 50 mcg, skelaxin, mobic, percocet  Patient should be instructed to continue fentanyl patch on day of surgery  If patient did not take percocet on day of surgery, order tylenol  Order lyrica preop  Consider precedex/ketamine intraop  Discuss with surgeon if APS needed while admitted    Plan Factors-    Induction- intravenous  Postoperative Plan- Plan for postoperative opioid use   Planned trial extubation    Informed Consent- Anesthetic plan and risks discussed with patient  NPO and allergies verified  Pt took Protonix, Percocet, and Levothyroxine this AM   She also received Lyrica in pre-op  Pt states her baseline pain score at home is ~6 out of 10  Plan:  GETA, arterial line    Risks and benefits discussed with pt  Questions answered  Pt consented

## 2018-06-03 PROBLEM — G95.20 COMPRESSION OF SPINAL CORD (HCC): Chronic | Status: ACTIVE | Noted: 2018-04-24

## 2018-06-03 PROBLEM — M48.062 LUMBAR STENOSIS WITH NEUROGENIC CLAUDICATION: Chronic | Status: ACTIVE | Noted: 2018-04-24

## 2018-06-03 PROBLEM — Z45.42 BATTERY END OF LIFE OF SPINAL CORD STIMULATOR: Chronic | Status: ACTIVE | Noted: 2018-04-24

## 2018-06-04 ENCOUNTER — APPOINTMENT (OUTPATIENT)
Dept: RADIOLOGY | Facility: HOSPITAL | Age: 73
DRG: 460 | End: 2018-06-04
Payer: COMMERCIAL

## 2018-06-04 ENCOUNTER — ANESTHESIA (OUTPATIENT)
Dept: PERIOP | Facility: HOSPITAL | Age: 73
DRG: 460 | End: 2018-06-04
Payer: COMMERCIAL

## 2018-06-04 ENCOUNTER — HOSPITAL ENCOUNTER (INPATIENT)
Facility: HOSPITAL | Age: 73
LOS: 4 days | Discharge: HOME HEALTH | DRG: 460 | End: 2018-06-08
Attending: NEUROLOGICAL SURGERY | Admitting: NEUROLOGICAL SURGERY
Payer: COMMERCIAL

## 2018-06-04 ENCOUNTER — APPOINTMENT (INPATIENT)
Dept: RADIOLOGY | Facility: HOSPITAL | Age: 73
DRG: 460 | End: 2018-06-04
Payer: COMMERCIAL

## 2018-06-04 DIAGNOSIS — K59.00 CONSTIPATION, UNSPECIFIED CONSTIPATION TYPE: Primary | ICD-10-CM

## 2018-06-04 DIAGNOSIS — M48.062 LUMBAR STENOSIS WITH NEUROGENIC CLAUDICATION: ICD-10-CM

## 2018-06-04 DIAGNOSIS — G95.20 COMPRESSION OF SPINAL CORD (HCC): ICD-10-CM

## 2018-06-04 DIAGNOSIS — Z01.818 PRE-PROCEDURAL EXAMINATION: ICD-10-CM

## 2018-06-04 DIAGNOSIS — Z98.1 STATUS POST LUMBAR SPINAL FUSION: ICD-10-CM

## 2018-06-04 DIAGNOSIS — M96.1 FAILED BACK SURGICAL SYNDROME: ICD-10-CM

## 2018-06-04 DIAGNOSIS — Z45.42 BATTERY END OF LIFE OF SPINAL CORD STIMULATOR: ICD-10-CM

## 2018-06-04 LAB
ABO GROUP BLD: NORMAL
ANION GAP SERPL CALCULATED.3IONS-SCNC: 5 MMOL/L (ref 4–13)
BASE EXCESS BLDA CALC-SCNC: -3 MMOL/L (ref -2–3)
BASE EXCESS BLDA CALC-SCNC: -4 MMOL/L (ref -2–3)
BASE EXCESS BLDA CALC-SCNC: 1 MMOL/L (ref -2–3)
BLD GP AB SCN SERPL QL: NEGATIVE
BUN SERPL-MCNC: 19 MG/DL (ref 5–25)
CA-I BLD-SCNC: 1.18 MMOL/L (ref 1.12–1.32)
CA-I BLD-SCNC: 1.2 MMOL/L (ref 1.12–1.32)
CA-I BLD-SCNC: 1.21 MMOL/L (ref 1.12–1.32)
CALCIUM SERPL-MCNC: 8.1 MG/DL (ref 8.3–10.1)
CHLORIDE SERPL-SCNC: 106 MMOL/L (ref 100–108)
CO2 SERPL-SCNC: 26 MMOL/L (ref 21–32)
CREAT SERPL-MCNC: 0.91 MG/DL (ref 0.6–1.3)
GFR SERPL CREATININE-BSD FRML MDRD: 62 ML/MIN/1.73SQ M
GLUCOSE SERPL-MCNC: 179 MG/DL (ref 65–140)
GLUCOSE SERPL-MCNC: 183 MG/DL (ref 65–140)
GLUCOSE SERPL-MCNC: 190 MG/DL (ref 65–140)
GLUCOSE SERPL-MCNC: 193 MG/DL (ref 65–140)
GLUCOSE SERPL-MCNC: 202 MG/DL (ref 65–140)
GLUCOSE SERPL-MCNC: 210 MG/DL (ref 65–140)
GLUCOSE SERPL-MCNC: 242 MG/DL (ref 65–140)
HCO3 BLDA-SCNC: 21.3 MMOL/L (ref 24–30)
HCO3 BLDA-SCNC: 21.8 MMOL/L (ref 24–30)
HCO3 BLDA-SCNC: 25.9 MMOL/L (ref 24–30)
HCT VFR BLD CALC: 23 % (ref 34.8–46.1)
HCT VFR BLD CALC: 23 % (ref 34.8–46.1)
HCT VFR BLD CALC: 26 % (ref 34.8–46.1)
HGB BLD-MCNC: 7.5 G/DL (ref 11.5–15.4)
HGB BLDA-MCNC: 7.8 G/DL (ref 11.5–15.4)
HGB BLDA-MCNC: 7.8 G/DL (ref 11.5–15.4)
HGB BLDA-MCNC: 8.8 G/DL (ref 11.5–15.4)
PCO2 BLD: 22 MMOL/L (ref 21–32)
PCO2 BLD: 23 MMOL/L (ref 21–32)
PCO2 BLD: 27 MMOL/L (ref 21–32)
PCO2 BLD: 38.3 MM HG (ref 42–50)
PCO2 BLD: 39.5 MM HG (ref 42–50)
PCO2 BLD: 41.3 MM HG (ref 42–50)
PH BLD: 7.34 [PH] (ref 7.3–7.4)
PH BLD: 7.36 [PH] (ref 7.3–7.4)
PH BLD: 7.41 [PH] (ref 7.3–7.4)
PLATELET # BLD AUTO: 381 THOUSANDS/UL (ref 149–390)
PMV BLD AUTO: 9.5 FL (ref 8.9–12.7)
PO2 BLD: 148 MM HG (ref 35–45)
PO2 BLD: 155 MM HG (ref 35–45)
PO2 BLD: 192 MM HG (ref 35–45)
POTASSIUM BLD-SCNC: 4.1 MMOL/L (ref 3.5–5.3)
POTASSIUM BLD-SCNC: 4.5 MMOL/L (ref 3.5–5.3)
POTASSIUM BLD-SCNC: 4.6 MMOL/L (ref 3.5–5.3)
POTASSIUM SERPL-SCNC: 4.4 MMOL/L (ref 3.5–5.3)
RH BLD: POSITIVE
SAO2 % BLD FROM PO2: 100 % (ref 95–98)
SAO2 % BLD FROM PO2: 99 % (ref 95–98)
SAO2 % BLD FROM PO2: 99 % (ref 95–98)
SODIUM BLD-SCNC: 139 MMOL/L (ref 136–145)
SODIUM BLD-SCNC: 141 MMOL/L (ref 136–145)
SODIUM BLD-SCNC: 142 MMOL/L (ref 136–145)
SODIUM SERPL-SCNC: 137 MMOL/L (ref 136–145)
SPECIMEN EXPIRATION DATE: NORMAL
SPECIMEN SOURCE: ABNORMAL

## 2018-06-04 PROCEDURE — 86923 COMPATIBILITY TEST ELECTRIC: CPT

## 2018-06-04 PROCEDURE — 82947 ASSAY GLUCOSE BLOOD QUANT: CPT

## 2018-06-04 PROCEDURE — 20930 SP BONE ALGRFT MORSEL ADD-ON: CPT | Performed by: NEUROLOGICAL SURGERY

## 2018-06-04 PROCEDURE — 86901 BLOOD TYPING SEROLOGIC RH(D): CPT | Performed by: ANESTHESIOLOGY

## 2018-06-04 PROCEDURE — P9016 RBC LEUKOCYTES REDUCED: HCPCS

## 2018-06-04 PROCEDURE — 85014 HEMATOCRIT: CPT

## 2018-06-04 PROCEDURE — 63048 LAM FACETEC &FORAMOT EA ADDL: CPT | Performed by: NEUROLOGICAL SURGERY

## 2018-06-04 PROCEDURE — C1713 ANCHOR/SCREW BN/BN,TIS/BN: HCPCS | Performed by: NEUROLOGICAL SURGERY

## 2018-06-04 PROCEDURE — 0JPT0MZ REMOVAL OF STIMULATOR GENERATOR FROM TRUNK SUBCUTANEOUS TISSUE AND FASCIA, OPEN APPROACH: ICD-10-PCS | Performed by: NEUROLOGICAL SURGERY

## 2018-06-04 PROCEDURE — 20936 SP BONE AGRFT LOCAL ADD-ON: CPT | Performed by: NEUROLOGICAL SURGERY

## 2018-06-04 PROCEDURE — 84295 ASSAY OF SERUM SODIUM: CPT

## 2018-06-04 PROCEDURE — 95940 IONM IN OPERATNG ROOM 15 MIN: CPT | Performed by: NEUROLOGICAL SURGERY

## 2018-06-04 PROCEDURE — 22612 ARTHRD PST TQ 1NTRSPC LUMBAR: CPT | Performed by: NEUROLOGICAL SURGERY

## 2018-06-04 PROCEDURE — 80048 BASIC METABOLIC PNL TOTAL CA: CPT | Performed by: NEUROLOGICAL SURGERY

## 2018-06-04 PROCEDURE — C1820 GENERATOR NEURO RECHG BAT SY: HCPCS | Performed by: NEUROLOGICAL SURGERY

## 2018-06-04 PROCEDURE — 72100 X-RAY EXAM L-S SPINE 2/3 VWS: CPT

## 2018-06-04 PROCEDURE — 22614 ARTHRD PST TQ 1NTRSPC EA ADD: CPT | Performed by: NEUROLOGICAL SURGERY

## 2018-06-04 PROCEDURE — 22842 INSERT SPINE FIXATION DEVICE: CPT | Performed by: NEUROLOGICAL SURGERY

## 2018-06-04 PROCEDURE — 82948 REAGENT STRIP/BLOOD GLUCOSE: CPT

## 2018-06-04 PROCEDURE — 22830 EXPLORATION OF SPINAL FUSION: CPT | Performed by: NEUROLOGICAL SURGERY

## 2018-06-04 PROCEDURE — 0JH70MZ INSERTION OF STIMULATOR GENERATOR INTO BACK SUBCUTANEOUS TISSUE AND FASCIA, OPEN APPROACH: ICD-10-PCS | Performed by: NEUROLOGICAL SURGERY

## 2018-06-04 PROCEDURE — 63047 LAM FACETEC & FORAMOT LUMBAR: CPT | Performed by: NEUROLOGICAL SURGERY

## 2018-06-04 PROCEDURE — 0RGA071 FUSION OF THORACOLUMBAR VERTEBRAL JOINT WITH AUTOLOGOUS TISSUE SUBSTITUTE, POSTERIOR APPROACH, POSTERIOR COLUMN, OPEN APPROACH: ICD-10-PCS | Performed by: NEUROLOGICAL SURGERY

## 2018-06-04 PROCEDURE — 63685 INS/RPLC SPI NPG/RCVR POCKET: CPT | Performed by: NEUROLOGICAL SURGERY

## 2018-06-04 PROCEDURE — 85049 AUTOMATED PLATELET COUNT: CPT | Performed by: NEUROLOGICAL SURGERY

## 2018-06-04 PROCEDURE — 86850 RBC ANTIBODY SCREEN: CPT | Performed by: ANESTHESIOLOGY

## 2018-06-04 PROCEDURE — 84132 ASSAY OF SERUM POTASSIUM: CPT

## 2018-06-04 PROCEDURE — 85018 HEMOGLOBIN: CPT | Performed by: ANESTHESIOLOGY

## 2018-06-04 PROCEDURE — C1787 PATIENT PROGR, NEUROSTIM: HCPCS | Performed by: NEUROLOGICAL SURGERY

## 2018-06-04 PROCEDURE — 86900 BLOOD TYPING SEROLOGIC ABO: CPT | Performed by: ANESTHESIOLOGY

## 2018-06-04 PROCEDURE — 82330 ASSAY OF CALCIUM: CPT

## 2018-06-04 PROCEDURE — 0SG1071 FUSION OF 2 OR MORE LUMBAR VERTEBRAL JOINTS WITH AUTOLOGOUS TISSUE SUBSTITUTE, POSTERIOR APPROACH, POSTERIOR COLUMN, OPEN APPROACH: ICD-10-PCS | Performed by: NEUROLOGICAL SURGERY

## 2018-06-04 PROCEDURE — 82803 BLOOD GASES ANY COMBINATION: CPT

## 2018-06-04 DEVICE — CROSSLINK 5442141 4.75X10 MLTSPN 41-48MM
Type: IMPLANTABLE DEVICE | Site: SPINE LUMBAR | Status: FUNCTIONAL
Brand: CD HORIZON® SPINAL SYSTEM

## 2018-06-04 DEVICE — SCREW 54840004545 MAS 4.5X45 CC
Type: IMPLANTABLE DEVICE | Site: SPINE THORACIC | Status: FUNCTIONAL
Brand: CD HORIZON® SPINAL SYSTEM

## 2018-06-04 DEVICE — NEUROSTIM INTELLIS SURESCAN MRI W/ ADAPTIVESTIM: Type: IMPLANTABLE DEVICE | Site: BUTTOCKS | Status: FUNCTIONAL

## 2018-06-04 DEVICE — BIOLOGICS 7600706 MSTRGRFT PUTY 6 CC KIT
Type: IMPLANTABLE DEVICE | Site: SPINE LUMBAR | Status: FUNCTIONAL
Brand: MASTERGRAFT®PUTTY

## 2018-06-04 RX ORDER — SENNOSIDES 8.6 MG
1 TABLET ORAL DAILY
Status: DISCONTINUED | OUTPATIENT
Start: 2018-06-05 | End: 2018-06-08 | Stop reason: HOSPADM

## 2018-06-04 RX ORDER — GLYCOPYRROLATE 0.2 MG/ML
INJECTION INTRAMUSCULAR; INTRAVENOUS AS NEEDED
Status: DISCONTINUED | OUTPATIENT
Start: 2018-06-04 | End: 2018-06-04 | Stop reason: SURG

## 2018-06-04 RX ORDER — LUTEIN 10 MG
10 TABLET ORAL DAILY
Status: DISCONTINUED | OUTPATIENT
Start: 2018-06-05 | End: 2018-06-04 | Stop reason: RX

## 2018-06-04 RX ORDER — VANCOMYCIN HYDROCHLORIDE 1 G/200ML
1000 INJECTION, SOLUTION INTRAVENOUS ONCE
Status: COMPLETED | OUTPATIENT
Start: 2018-06-04 | End: 2018-06-04

## 2018-06-04 RX ORDER — MULTIVITAMIN WITH IRON
100 TABLET ORAL DAILY
COMMUNITY
End: 2020-09-21 | Stop reason: ALTCHOICE

## 2018-06-04 RX ORDER — OXYBUTYNIN CHLORIDE 5 MG/1
15 TABLET, EXTENDED RELEASE ORAL
Status: DISCONTINUED | OUTPATIENT
Start: 2018-06-04 | End: 2018-06-08 | Stop reason: HOSPADM

## 2018-06-04 RX ORDER — BUPIVACAINE HYDROCHLORIDE AND EPINEPHRINE 5; 5 MG/ML; UG/ML
INJECTION, SOLUTION EPIDURAL; INTRACAUDAL; PERINEURAL AS NEEDED
Status: DISCONTINUED | OUTPATIENT
Start: 2018-06-04 | End: 2018-06-04 | Stop reason: HOSPADM

## 2018-06-04 RX ORDER — ONDANSETRON 2 MG/ML
4 INJECTION INTRAMUSCULAR; INTRAVENOUS ONCE AS NEEDED
Status: DISCONTINUED | OUTPATIENT
Start: 2018-06-04 | End: 2018-06-04 | Stop reason: HOSPADM

## 2018-06-04 RX ORDER — OXYCODONE HYDROCHLORIDE 10 MG/1
10 TABLET ORAL EVERY 4 HOURS PRN
Status: DISCONTINUED | OUTPATIENT
Start: 2018-06-04 | End: 2018-06-08 | Stop reason: HOSPADM

## 2018-06-04 RX ORDER — SODIUM CHLORIDE, SODIUM LACTATE, POTASSIUM CHLORIDE, CALCIUM CHLORIDE 600; 310; 30; 20 MG/100ML; MG/100ML; MG/100ML; MG/100ML
100 INJECTION, SOLUTION INTRAVENOUS CONTINUOUS
Status: DISCONTINUED | OUTPATIENT
Start: 2018-06-04 | End: 2018-06-05

## 2018-06-04 RX ORDER — GABAPENTIN 300 MG/1
300 CAPSULE ORAL 2 TIMES DAILY
Status: DISCONTINUED | OUTPATIENT
Start: 2018-06-04 | End: 2018-06-08 | Stop reason: HOSPADM

## 2018-06-04 RX ORDER — CHLORHEXIDINE GLUCONATE 0.12 MG/ML
15 RINSE ORAL ONCE
Status: COMPLETED | OUTPATIENT
Start: 2018-06-04 | End: 2018-06-04

## 2018-06-04 RX ORDER — SUCCINYLCHOLINE CHLORIDE 20 MG/ML
INJECTION INTRAMUSCULAR; INTRAVENOUS AS NEEDED
Status: DISCONTINUED | OUTPATIENT
Start: 2018-06-04 | End: 2018-06-04 | Stop reason: SURG

## 2018-06-04 RX ORDER — PROPOFOL 10 MG/ML
INJECTION, EMULSION INTRAVENOUS AS NEEDED
Status: DISCONTINUED | OUTPATIENT
Start: 2018-06-04 | End: 2018-06-04 | Stop reason: SURG

## 2018-06-04 RX ORDER — PRAVASTATIN SODIUM 40 MG
40 TABLET ORAL
Status: DISCONTINUED | OUTPATIENT
Start: 2018-06-04 | End: 2018-06-08 | Stop reason: HOSPADM

## 2018-06-04 RX ORDER — CHLORHEXIDINE GLUCONATE 0.12 MG/ML
15 RINSE ORAL EVERY 12 HOURS SCHEDULED
Status: DISCONTINUED | OUTPATIENT
Start: 2018-06-04 | End: 2018-06-04

## 2018-06-04 RX ORDER — LORATADINE 10 MG/1
10 TABLET ORAL DAILY PRN
Status: DISCONTINUED | OUTPATIENT
Start: 2018-06-04 | End: 2018-06-08 | Stop reason: HOSPADM

## 2018-06-04 RX ORDER — VALSARTAN 80 MG/1
40 TABLET ORAL DAILY
Status: DISCONTINUED | OUTPATIENT
Start: 2018-06-05 | End: 2018-06-08 | Stop reason: HOSPADM

## 2018-06-04 RX ORDER — METHOCARBAMOL 500 MG/1
500 TABLET, FILM COATED ORAL EVERY 6 HOURS SCHEDULED
Status: DISCONTINUED | OUTPATIENT
Start: 2018-06-04 | End: 2018-06-07

## 2018-06-04 RX ORDER — LORATADINE 10 MG/1
10 TABLET ORAL DAILY PRN
COMMUNITY
End: 2020-09-21 | Stop reason: ALTCHOICE

## 2018-06-04 RX ORDER — ACETAMINOPHEN 325 MG/1
975 TABLET ORAL ONCE
Status: COMPLETED | OUTPATIENT
Start: 2018-06-04 | End: 2018-06-04

## 2018-06-04 RX ORDER — GUAIFENESIN 400 MG/1
400 TABLET ORAL DAILY PRN
COMMUNITY
End: 2020-09-21 | Stop reason: ALTCHOICE

## 2018-06-04 RX ORDER — DOCUSATE SODIUM 100 MG/1
100 CAPSULE, LIQUID FILLED ORAL 2 TIMES DAILY
Status: DISCONTINUED | OUTPATIENT
Start: 2018-06-04 | End: 2018-06-08 | Stop reason: HOSPADM

## 2018-06-04 RX ORDER — ONDANSETRON 2 MG/ML
4 INJECTION INTRAMUSCULAR; INTRAVENOUS EVERY 6 HOURS PRN
Status: DISCONTINUED | OUTPATIENT
Start: 2018-06-04 | End: 2018-06-08 | Stop reason: HOSPADM

## 2018-06-04 RX ORDER — FERROUS SULFATE 325(65) MG
325 TABLET ORAL 2 TIMES DAILY WITH MEALS
Status: DISCONTINUED | OUTPATIENT
Start: 2018-06-04 | End: 2018-06-08 | Stop reason: HOSPADM

## 2018-06-04 RX ORDER — FENTANYL CITRATE 50 UG/ML
INJECTION, SOLUTION INTRAMUSCULAR; INTRAVENOUS AS NEEDED
Status: DISCONTINUED | OUTPATIENT
Start: 2018-06-04 | End: 2018-06-04 | Stop reason: SURG

## 2018-06-04 RX ORDER — LIDOCAINE HYDROCHLORIDE AND EPINEPHRINE 10; 10 MG/ML; UG/ML
INJECTION, SOLUTION INFILTRATION; PERINEURAL AS NEEDED
Status: DISCONTINUED | OUTPATIENT
Start: 2018-06-04 | End: 2018-06-04 | Stop reason: HOSPADM

## 2018-06-04 RX ORDER — VANCOMYCIN HYDROCHLORIDE 1 G/200ML
1000 INJECTION, SOLUTION INTRAVENOUS EVERY 12 HOURS
Status: COMPLETED | OUTPATIENT
Start: 2018-06-05 | End: 2018-06-05

## 2018-06-04 RX ORDER — MULTIVITAMIN WITH IRON
100 TABLET ORAL DAILY
Status: DISCONTINUED | OUTPATIENT
Start: 2018-06-05 | End: 2018-06-08 | Stop reason: HOSPADM

## 2018-06-04 RX ORDER — HEPARIN SODIUM 5000 [USP'U]/ML
5000 INJECTION, SOLUTION INTRAVENOUS; SUBCUTANEOUS EVERY 8 HOURS SCHEDULED
Status: DISCONTINUED | OUTPATIENT
Start: 2018-06-05 | End: 2018-06-08 | Stop reason: HOSPADM

## 2018-06-04 RX ORDER — PREGABALIN 75 MG/1
150 CAPSULE ORAL ONCE
Status: COMPLETED | OUTPATIENT
Start: 2018-06-04 | End: 2018-06-04

## 2018-06-04 RX ORDER — LIDOCAINE HYDROCHLORIDE 10 MG/ML
INJECTION, SOLUTION INFILTRATION; PERINEURAL AS NEEDED
Status: DISCONTINUED | OUTPATIENT
Start: 2018-06-04 | End: 2018-06-04 | Stop reason: SURG

## 2018-06-04 RX ORDER — OXYCODONE HYDROCHLORIDE 10 MG/1
10 TABLET ORAL EVERY 4 HOURS PRN
Status: DISCONTINUED | OUTPATIENT
Start: 2018-06-04 | End: 2018-06-04

## 2018-06-04 RX ORDER — FENTANYL CITRATE/PF 50 MCG/ML
50 SYRINGE (ML) INJECTION
Status: DISCONTINUED | OUTPATIENT
Start: 2018-06-04 | End: 2018-06-04 | Stop reason: HOSPADM

## 2018-06-04 RX ORDER — GABAPENTIN 100 MG/1
100 CAPSULE ORAL 3 TIMES DAILY PRN
COMMUNITY
End: 2018-06-08 | Stop reason: HOSPADM

## 2018-06-04 RX ORDER — ACETAMINOPHEN 160 MG/5ML
650 SUSPENSION, ORAL (FINAL DOSE FORM) ORAL EVERY 4 HOURS
Status: DISCONTINUED | OUTPATIENT
Start: 2018-06-04 | End: 2018-06-05

## 2018-06-04 RX ORDER — PANTOPRAZOLE SODIUM 40 MG/1
40 TABLET, DELAYED RELEASE ORAL
Status: DISCONTINUED | OUTPATIENT
Start: 2018-06-04 | End: 2018-06-08 | Stop reason: HOSPADM

## 2018-06-04 RX ORDER — LATANOPROST 50 UG/ML
1 SOLUTION/ DROPS OPHTHALMIC
Status: DISCONTINUED | OUTPATIENT
Start: 2018-06-04 | End: 2018-06-08 | Stop reason: HOSPADM

## 2018-06-04 RX ORDER — PROPOFOL 10 MG/ML
INJECTION, EMULSION INTRAVENOUS CONTINUOUS PRN
Status: DISCONTINUED | OUTPATIENT
Start: 2018-06-04 | End: 2018-06-04 | Stop reason: SURG

## 2018-06-04 RX ORDER — DICYCLOMINE HYDROCHLORIDE 10 MG/1
10 CAPSULE ORAL 3 TIMES DAILY PRN
Status: DISCONTINUED | OUTPATIENT
Start: 2018-06-04 | End: 2018-06-08 | Stop reason: HOSPADM

## 2018-06-04 RX ORDER — LEVOTHYROXINE SODIUM 0.03 MG/1
25 TABLET ORAL
Status: DISCONTINUED | OUTPATIENT
Start: 2018-06-05 | End: 2018-06-08 | Stop reason: HOSPADM

## 2018-06-04 RX ORDER — MECLIZINE HYDROCHLORIDE 25 MG/1
25 TABLET ORAL EVERY 8 HOURS SCHEDULED
Status: DISCONTINUED | OUTPATIENT
Start: 2018-06-04 | End: 2018-06-08 | Stop reason: HOSPADM

## 2018-06-04 RX ORDER — ROCURONIUM BROMIDE 10 MG/ML
INJECTION, SOLUTION INTRAVENOUS AS NEEDED
Status: DISCONTINUED | OUTPATIENT
Start: 2018-06-04 | End: 2018-06-04 | Stop reason: SURG

## 2018-06-04 RX ORDER — GUAIFENESIN 100 MG/5ML
400 SOLUTION ORAL DAILY PRN
Status: DISCONTINUED | OUTPATIENT
Start: 2018-06-04 | End: 2018-06-08 | Stop reason: HOSPADM

## 2018-06-04 RX ORDER — ACETAMINOPHEN 160 MG
TABLET,DISINTEGRATING ORAL AS NEEDED
Status: DISCONTINUED | OUTPATIENT
Start: 2018-06-04 | End: 2018-06-04 | Stop reason: HOSPADM

## 2018-06-04 RX ORDER — EPHEDRINE SULFATE 50 MG/ML
INJECTION, SOLUTION INTRAVENOUS AS NEEDED
Status: DISCONTINUED | OUTPATIENT
Start: 2018-06-04 | End: 2018-06-04 | Stop reason: SURG

## 2018-06-04 RX ORDER — PROMETHAZINE HYDROCHLORIDE 25 MG/ML
12.5 INJECTION, SOLUTION INTRAMUSCULAR; INTRAVENOUS ONCE AS NEEDED
Status: DISCONTINUED | OUTPATIENT
Start: 2018-06-04 | End: 2018-06-04 | Stop reason: HOSPADM

## 2018-06-04 RX ORDER — SODIUM CHLORIDE 9 MG/ML
INJECTION, SOLUTION INTRAVENOUS CONTINUOUS PRN
Status: DISCONTINUED | OUTPATIENT
Start: 2018-06-04 | End: 2018-06-04 | Stop reason: SURG

## 2018-06-04 RX ORDER — KETAMINE HYDROCHLORIDE 50 MG/ML
INJECTION, SOLUTION, CONCENTRATE INTRAMUSCULAR; INTRAVENOUS AS NEEDED
Status: DISCONTINUED | OUTPATIENT
Start: 2018-06-04 | End: 2018-06-04 | Stop reason: SURG

## 2018-06-04 RX ORDER — ONDANSETRON 2 MG/ML
INJECTION INTRAMUSCULAR; INTRAVENOUS AS NEEDED
Status: DISCONTINUED | OUTPATIENT
Start: 2018-06-04 | End: 2018-06-04 | Stop reason: SURG

## 2018-06-04 RX ADMIN — ACETAMINOPHEN 975 MG: 325 TABLET ORAL at 06:09

## 2018-06-04 RX ADMIN — Medication 0.3 MCG/KG/HR: at 08:27

## 2018-06-04 RX ADMIN — PANTOPRAZOLE SODIUM 40 MG: 40 TABLET, DELAYED RELEASE ORAL at 18:43

## 2018-06-04 RX ADMIN — HYDROMORPHONE HYDROCHLORIDE 0.25 MG: 1 INJECTION, SOLUTION INTRAMUSCULAR; INTRAVENOUS; SUBCUTANEOUS at 12:53

## 2018-06-04 RX ADMIN — PRAVASTATIN SODIUM 40 MG: 40 TABLET ORAL at 18:43

## 2018-06-04 RX ADMIN — SODIUM CHLORIDE, SODIUM LACTATE, POTASSIUM CHLORIDE, AND CALCIUM CHLORIDE 100 ML/HR: .6; .31; .03; .02 INJECTION, SOLUTION INTRAVENOUS at 15:30

## 2018-06-04 RX ADMIN — INSULIN HUMAN 6 UNITS: 100 INJECTION, SOLUTION PARENTERAL at 14:25

## 2018-06-04 RX ADMIN — LATANOPROST 1 DROP: 50 SOLUTION OPHTHALMIC at 21:44

## 2018-06-04 RX ADMIN — HYDROMORPHONE HYDROCHLORIDE 0.25 MG: 1 INJECTION, SOLUTION INTRAMUSCULAR; INTRAVENOUS; SUBCUTANEOUS at 11:41

## 2018-06-04 RX ADMIN — SODIUM CHLORIDE: 0.9 INJECTION, SOLUTION INTRAVENOUS at 08:25

## 2018-06-04 RX ADMIN — INSULIN HUMAN 3 UNITS: 100 INJECTION, SOLUTION PARENTERAL at 09:23

## 2018-06-04 RX ADMIN — EPHEDRINE SULFATE 5 MG: 50 INJECTION, SOLUTION INTRAMUSCULAR; INTRAVENOUS; SUBCUTANEOUS at 08:53

## 2018-06-04 RX ADMIN — ONDANSETRON 4 MG: 2 INJECTION INTRAMUSCULAR; INTRAVENOUS at 13:31

## 2018-06-04 RX ADMIN — HYDROMORPHONE HYDROCHLORIDE 0.5 MG: 1 INJECTION, SOLUTION INTRAMUSCULAR; INTRAVENOUS; SUBCUTANEOUS at 20:01

## 2018-06-04 RX ADMIN — FENTANYL CITRATE 50 MCG: 50 INJECTION INTRAMUSCULAR; INTRAVENOUS at 14:30

## 2018-06-04 RX ADMIN — PROPOFOL 160 MG: 10 INJECTION, EMULSION INTRAVENOUS at 07:43

## 2018-06-04 RX ADMIN — HYDROMORPHONE HYDROCHLORIDE 0.25 MG: 1 INJECTION, SOLUTION INTRAMUSCULAR; INTRAVENOUS; SUBCUTANEOUS at 14:58

## 2018-06-04 RX ADMIN — PHENYLEPHRINE HYDROCHLORIDE 40 MCG/MIN: 10 INJECTION INTRAVENOUS at 08:45

## 2018-06-04 RX ADMIN — ROCURONIUM BROMIDE 20 MG: 10 INJECTION INTRAVENOUS at 09:17

## 2018-06-04 RX ADMIN — MECLIZINE HYDROCHLORIDE 25 MG: 25 TABLET ORAL at 21:44

## 2018-06-04 RX ADMIN — HYDROMORPHONE HYDROCHLORIDE 0.25 MG: 1 INJECTION, SOLUTION INTRAMUSCULAR; INTRAVENOUS; SUBCUTANEOUS at 14:52

## 2018-06-04 RX ADMIN — CHLORHEXIDINE GLUCONATE 15 ML: 1.2 RINSE ORAL at 06:09

## 2018-06-04 RX ADMIN — DOCUSATE SODIUM 100 MG: 100 CAPSULE, LIQUID FILLED ORAL at 18:44

## 2018-06-04 RX ADMIN — INSULIN HUMAN 3 UNITS: 100 INJECTION, SOLUTION PARENTERAL at 12:57

## 2018-06-04 RX ADMIN — FENTANYL CITRATE 50 MCG: 50 INJECTION INTRAMUSCULAR; INTRAVENOUS at 14:18

## 2018-06-04 RX ADMIN — FENTANYL CITRATE 100 MCG: 50 INJECTION, SOLUTION INTRAMUSCULAR; INTRAVENOUS at 07:41

## 2018-06-04 RX ADMIN — METFORMIN HYDROCHLORIDE 1000 MG: 500 TABLET, FILM COATED ORAL at 18:44

## 2018-06-04 RX ADMIN — SODIUM CHLORIDE, SODIUM LACTATE, POTASSIUM CHLORIDE, AND CALCIUM CHLORIDE: .6; .31; .03; .02 INJECTION, SOLUTION INTRAVENOUS at 10:19

## 2018-06-04 RX ADMIN — OXYCODONE HYDROCHLORIDE 10 MG: 10 TABLET ORAL at 21:44

## 2018-06-04 RX ADMIN — OXYBUTYNIN CHLORIDE 15 MG: 5 TABLET, EXTENDED RELEASE ORAL at 21:45

## 2018-06-04 RX ADMIN — PROPOFOL 100 MCG/KG/MIN: 10 INJECTION, EMULSION INTRAVENOUS at 08:27

## 2018-06-04 RX ADMIN — METHOCARBAMOL 500 MG: 500 TABLET ORAL at 23:32

## 2018-06-04 RX ADMIN — ACETAMINOPHEN 650 MG: 160 SUSPENSION ORAL at 18:42

## 2018-06-04 RX ADMIN — HYDROMORPHONE HYDROCHLORIDE 0.5 MG: 1 INJECTION, SOLUTION INTRAMUSCULAR; INTRAVENOUS; SUBCUTANEOUS at 22:41

## 2018-06-04 RX ADMIN — VANCOMYCIN HYDROCHLORIDE 1000 MG: 1 INJECTION, SOLUTION INTRAVENOUS at 07:50

## 2018-06-04 RX ADMIN — HYDROMORPHONE HYDROCHLORIDE 0.25 MG: 1 INJECTION, SOLUTION INTRAMUSCULAR; INTRAVENOUS; SUBCUTANEOUS at 15:21

## 2018-06-04 RX ADMIN — OXYCODONE HYDROCHLORIDE 10 MG: 10 TABLET ORAL at 17:08

## 2018-06-04 RX ADMIN — LIDOCAINE HYDROCHLORIDE 50 MG: 10 INJECTION, SOLUTION INFILTRATION; PERINEURAL at 07:42

## 2018-06-04 RX ADMIN — KETAMINE HYDROCHLORIDE 50 MG: 50 INJECTION, SOLUTION INTRAMUSCULAR; INTRAVENOUS at 07:44

## 2018-06-04 RX ADMIN — HYDROMORPHONE HYDROCHLORIDE 0.25 MG: 1 INJECTION, SOLUTION INTRAMUSCULAR; INTRAVENOUS; SUBCUTANEOUS at 15:10

## 2018-06-04 RX ADMIN — Medication 0.18 MCG/KG/MIN: at 08:27

## 2018-06-04 RX ADMIN — SODIUM CHLORIDE, SODIUM LACTATE, POTASSIUM CHLORIDE, AND CALCIUM CHLORIDE: .6; .31; .03; .02 INJECTION, SOLUTION INTRAVENOUS at 07:30

## 2018-06-04 RX ADMIN — FERROUS SULFATE TAB 325 MG (65 MG ELEMENTAL FE) 325 MG: 325 (65 FE) TAB at 18:44

## 2018-06-04 RX ADMIN — DEXAMETHASONE SODIUM PHOSPHATE 5 MG: 10 INJECTION INTRAMUSCULAR; INTRAVENOUS at 08:12

## 2018-06-04 RX ADMIN — ACETAMINOPHEN 650 MG: 160 SUSPENSION ORAL at 23:32

## 2018-06-04 RX ADMIN — GABAPENTIN 300 MG: 300 CAPSULE ORAL at 18:43

## 2018-06-04 RX ADMIN — PREGABALIN 150 MG: 75 CAPSULE ORAL at 06:09

## 2018-06-04 RX ADMIN — NEOSTIGMINE METHYLSULFATE 2 MG: 1 INJECTION, SOLUTION INTRAMUSCULAR; INTRAVENOUS; SUBCUTANEOUS at 13:43

## 2018-06-04 RX ADMIN — SUCCINYLCHOLINE CHLORIDE 100 MG: 20 INJECTION, SOLUTION INTRAMUSCULAR; INTRAVENOUS at 07:44

## 2018-06-04 RX ADMIN — KETAMINE HYDROCHLORIDE 0.3 MG/KG/HR: 50 INJECTION, SOLUTION INTRAMUSCULAR; INTRAVENOUS at 08:27

## 2018-06-04 RX ADMIN — METHOCARBAMOL 500 MG: 500 TABLET ORAL at 18:44

## 2018-06-04 RX ADMIN — GLYCOPYRROLATE 0.4 MG: 0.2 INJECTION, SOLUTION INTRAMUSCULAR; INTRAVENOUS at 13:43

## 2018-06-04 NOTE — POST OP PROGRESS NOTES
Patient resting comfortably in bed with daughter at bedside  5/5 power in lower extremities  Reports normal light touch sensation  Complaining of some pain in her left foot and lower back pain which she rates as 8/10  Nursing about to get pain medication  Reviewed the results of surgery with the patient and her daughter  Plan for upright plain films this evening and this at bedside for dinner  Goal to maintain pain at approximately 5/10  [employer] representative will be by later this evening to turn on stimulator to help manage more chronic neuropathic pain  All questions answered  They are in agreement with this course of action

## 2018-06-04 NOTE — H&P (VIEW-ONLY)
Patient ID: Gagan Kraus is a 74 y o  female  HPI: 74 y  o female is being seen for a preoperative visit for a reopening of lumbar incision for T11-L5 posterior instrumented fixation and fusion and T12 -L4 posterior decompression with possible extension to additional levels removal of left buttock implantable pulse generator and placement of new  implantable pulse generator surgery on 06/04/18 by Dr Ela Quesada  Surgical Risk Assessment:    Prior anesthesia: Adverse Reaction to : No past reactions to epidural or spinal anesthesia Patient reports that it as in the past taken an extended period to completely clear general anesthesia  Family history of adverse reactions to anesthesia? no    Pertinent Past Medical History:  None       Exercise Capacity:     Able to walk 4 blocks w/o Sx:   Limited due to muscle weakness from lumbar spinal stenosis  Patient does not become short of breath or develop chest pain when ambulating in office       Lifestyle Factors: Tobacco Use:  Former smoker  quit 35 years ago        Pack years  Alcohol Use:  no  Illicit Drug Use:  No   No transfusions : no restrictions due to Restorationism or cultural beliefs    ULISSES Risk Factors:    No reported episodes of snoring, periods of not breathing, excessive daytime sleepiness    Personal history of venous thromboembolic disease:    History of Steroid use for >2 weeks within last year? Family History   Problem Relation Age of Onset    Lung cancer Mother     Pulmonary embolism Father     Stroke Maternal Grandmother     Heart attack Maternal Grandfather     No Known Problems Paternal Grandmother     No Known Problems Paternal Grandfather     Diabetes Family      Diabetes mellitus    Hypertension Family     Stroke Family      Stroke complications     Social History     Social History    Marital status:       Spouse name: N/A    Number of children: N/A    Years of education: N/A     Occupational History    Retired Social History Main Topics    Smoking status: Former Smoker    Smokeless tobacco: Never Used      Comment: Denied history of current ever day smoker, Former smoker and Never smoker all documented in [employer]    Alcohol use No      Comment: Denied history of alcohol use    Drug use: No      Comment: Denied history of drug use    Sexual activity: Not on file     Other Topics Concern    Not on file     Social History Narrative    Marital History - Currently  per [employer]         Past Medical History:   Diagnosis Date    Acid reflux     Anxiety     Arthritis     Asthma     [facility] narcotic dependence ([facility] )     Chronic pain     Colon polyp     Cystocele     Diabetes mellitus ([facility] )     Diverticulosis     Dysfunctional uterine bleeding     last assessed - [id number]    Fibromyalgia     Gastric ulcer     Gastroparesis     History of colonic polyps     last assessed - [id number]    History of gastroesophageal reflux (GERD)     Hypercholesterolemia     Hyperlipidemia     Hypertension     IBS (irritable bowel syndrome)     Kidney stone     Post laminectomy syndrome     Seasonal allergies     Spinal stenosis      Past Surgical History:   Procedure Laterality Date    APPENDECTOMY      CHOLECYSTECTOMY      ESOPHAGOGASTRODUODENOSCOPY N/A 9/28/2016    Procedure: ESOPHAGOGASTRODUODENOSCOPY (EGD); Surgeon: Maik Gould MD;  [facility]; Service:     HERNIA REPAIR      HYSTERECTOMY      TTAH-BSO last assessed - [id number]    LAMINECTOMY      LUMBAR LAMINECTOMY      TN COLONOSCOPY FLX DX W/COLLJ SPEC WHEN PFRMD N/A 3/2/2016    Procedure: EGD AND COLONOSCOPY;  Surgeon: Maik Gould MD;  [facility]; Service: Gastroenterology    TN DILATE ESOPHAGUS N/A 9/28/2016    Procedure: DILATATION ESOPHAGEAL;  Surgeon: Maik Gould MD;  [facility];   Service: Gastroenterology    TN ESOPHAGOGASTRODUODENOSCOPY TRANSORAL DIAGNOSTIC N/A 7/18/2016    Procedure: ESOPHAGOGASTRODUODENOSCOPY (EGD); Surgeon: Jean Gardner MD;  [facility]; Service: Gastroenterology     Allergies   Allergen Reactions    Penicillins Anaphylaxis     Other reaction(s): Unknown Reaction    Sulfa Antibiotics Anaphylaxis     Other reaction(s): Unknown Reaction    Aspartame     Iodinated Diagnostic Agents Hives     Pt has taken prep prior for contrast and has not had any break through reaction    Other      Other reaction(s): Unknown Reaction  Contrast dye         Current Outpatient Prescriptions:     Azilsartan Medoxomil (EDARBI) 40 MG TABS, Take by mouth daily  , Disp: , Rfl:     cefuroxime (CEFTIN) 500 mg tablet, Take 1 tablet (500 mg total) by mouth every 12 (twelve) hours for 10 days, Disp: 20 tablet, Rfl: 0    dicyclomine (BENTYL) 10 mg capsule, Take 1 capsule (10 mg total) by mouth 3 (three) times a day as needed (colon spasms) for up to 30 days, Disp: 90 capsule, Rfl: 2    fentaNYL (DURAGESIC) 50 mcg/hr, Place 1 patch on the skin every 3 (three) days Max Daily Amount: 1 patch, Disp: 10 patch, Rfl: 0    latanoprost (XALATAN) 0 005 % ophthalmic solution, ADMINISTER 1 DROP TO BOTH EYES DAILY AT BEDTIME FOR 30 DAYS, Disp: 2 5 mL, Rfl: 0    Lutein 10 MG TABS, Take by mouth daily  , Disp: , Rfl:     meclizine (ANTIVERT) 25 mg tablet, as needed  , Disp: , Rfl:     meloxicam (MOBIC) 15 mg tablet, Take 15 mg by mouth daily, Disp: , Rfl: 3    metaxalone (SKELAXIN) 800 mg tablet, Take 1 tablet (800 mg total) by mouth 3 (three) times a day for 30 days (Patient taking differently: Take 800 mg by mouth 3 (three) times a day as needed  ), Disp: 90 tablet, Rfl: 3    metFORMIN (GLUCOPHAGE) 1000 MG tablet, Take 1,000 mg by mouth 2 (two) times a day with meals, Disp: , Rfl:     Milnacipran HCl (SAVELLA) 100 MG TABS, Take 1 tablet by mouth daily, Disp: , Rfl:     oxybutynin (DITROPAN XL) 15 MG 24 hr tablet, Take 1 tablet by mouth 2 (two) times a day, Disp: , Rfl:    oxyCODONE-acetaminophen (PERCOCET)  mg per tablet, Take 1 tablet by mouth every 6 (six) hours as needed for moderate pain Max Daily Amount: 4 tablets, Disp: 120 tablet, Rfl: 0    pantoprazole (PROTONIX) 40 mg tablet, Take 1 tablet (40 mg total) by mouth 2 (two) times daily after meals for 30 days, Disp: 60 tablet, Rfl: 3    pitavastatin (LIVALO) 2 mg, Take 1 mg by mouth daily with dinner , Disp: , Rfl:     Review of Systems   Constitutional:  Denies fever, chills, fatigue, weakness, weight loss, weight gain     ENT: Denies earache, loss of hearing, nosebleed, nasal discharge, nasal congestion, sore throat, hoarseness  Pulmonary: Denies shortness of breath, cough, dyspnea on exertion, orthopnea, PND   Cardiovascular:  Denies bradycardia , tachycardia  ,palpations, lower extremity edema leg, claudication  Abdomen:  Denies abdominal pain, anorexia, indigestion, nausea, vomiting, constipation, diarrhea  Musculoskeletal: Positive for bilateral lower extremity weakness with use of a quad cane for balance and mobility supportand lower back pains  Skin: Denies skin rash, skin lesion, skin wound, itching, dry skin  Neuro: Bilateral lower extremity numbness   Denies headache, tingling, confusion, loss of consciousness, dizziness, vertigo  Psychiatric: Positive sleep disturbances   Denies feelings of depression, suicidal ideation, anxiety        Physical Exam:  Constitutional:   NAD, well appearing and well nourished      ENT:   Conjunctiva and lids: no injection, edema, or discharge     Pupils and iris: JAISON bilaterally    External inspection of ears and nose: normal without deformities or discharge  Otoscopic exam: Canals patent without erythema  Nasal mucosa, septum and turbinates: Normal or edema or discharge         Oropharynx:  Moist mucosa, normal tongue and tonsils without lesions  No erythema        Pulmonary:Respiratory effort normal rate and rhythm, no increased work of breathing   Auscultation of lungs:  Clear bilaterally with no adventitious breath sounds       Cardiovascular: regular rate and rhythm, S1 and S2, no murmur, no edema and/or varicosities of LE      Abdomen: Soft and non-distended     Positive bowel sounds      No heptomegaly or splenomegaly      Lymphatic:  No anterior or posterior cervical lymphadenopathy         Musculoskeletal:  Gait and station: Antaglic gait    Digits and nails normal without clubbing or cyanosis       Inspection/palpation of joints, bones, and muscles:  Decreased range of motion    Skin: Normal skin turgor and no rashes     Psych:   alert and oriented to person, place and time  normal mood and affect       DATA:  Laboratory Results:   Lab Results   Component Value Date    ALT 22 05/11/2018    AST 14 05/11/2018    BUN 14 05/11/2018    CALCIUM 9 0 05/11/2018     05/11/2018    CHOL 169 11/20/2017    CO2 26 05/11/2018    CREATININE 0 89 05/11/2018    HDL 50 11/20/2017    HCT 25 8 (L) 05/11/2018    HGB 7 3 (L) 05/11/2018    HGBA1C 7 6 (H) 05/11/2018     (H) 05/11/2018    K 4 0 05/11/2018     05/11/2018    TRIG 136 11/20/2017    WBC 7 73 05/11/2018     ECG: NSR; left axis deviation and LBBB - patient scheduled for Nuclear Medicine Stress Test on 05/21/18 at noon    Patient Clearance:Risk Estimation: per the Revised Cardiac Risk Index (Circ  [id number], 1999): the patient's risk factors for cardiac complications include:   RCI Risk Class: II (0 9% Risk of Major Cardiac Event)    Pre-op Evaluation Plan  1  Further preoperative work-up as follows: Nuclear Stress Test; TIBC: Ferratin, Vitamin B-12 level and Folate  2  Medication Management/Recommendations: Hold Metformin 48 hours prior and 48 hours after surgery; hold meloxicam as directed by surgeon's office      Clearance:  Patient is CLEARED for surgery pending additional cardiac testing      Assessment/Plan:  Diagnoses and all orders for this visit:    Anemia, unspecified type  -     Ferritin;  Future  - Vitamin B12; Future  -     Folate; Future  -     TIBC; Future    Left bundle branch block  -     NM myocardial perfusion spect (rx stress and/or rest); Future    Pre-op testing  -     POCT ECG    Other orders  -     Cancel: POCT ECG  -     metoclopramide (REGLAN) 5 mg tablet;  Take 5 mg by mouth 3 (three) times a day

## 2018-06-04 NOTE — OP NOTE
OPERATIVE REPORT  PATIENT NAME: Adin Cox    :  1945  MRN: [id number]  Pt Location: [facility] OR ROOM 17    SURGERY DATE: 2018    Surgeon(s) and Role:     * Anabell Chan MD - Primary     * Zhen Lipscomb MD - Assisting    No qualified resident was available  No PA-C was available  Dr Chapo Headley was present for the procedure, and provided essential assistance with proper exposure, retraction, hemostasis, instrumentation and decompression which was necessary secondary to the complex nature of this case  Preop Diagnosis:  Status post lumbar spinal fusion [Z98 1]  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]  Lumbar stenosis with neurogenic claudication [M48 062]  Battery end of life of spinal cord stimulator [Z45 42]    Post-Op Diagnosis Codes:     * Status post lumbar spinal fusion [Z98 1]     * 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]     * Lumbar stenosis with neurogenic claudication [M48 062]     * Battery end of life of spinal cord stimulator [Z45 42]    Procedure:  1  Reopening of left buttock incision for removal of spinal cord stimulator IPG and placement of new Medtronic IPG ([id number])  2  Reopening of lumbar incision for inspection of previous L4-5 fusion and extension of fusion from T12-L5 with Medtronic Solara system, locally harvested autograft, Mastergraft  3   T12-L4 posterior decompression with bilateral foraminotomies  Specimen(s):  * No specimens in log *    Estimated Blood Loss:   800 mL    Drains:  Closed/Suction Drain Right Back Bulb (Active)   Drainage Appearance Bloody 2018  1:46 PM   Status To bulb suction 2018  1:46 PM   Number of days: 0       [REMOVED] Urethral Catheter Latex 16 Fr   (Removed)   Collection Container Standard drainage bag 2018  8:15 AM   Number of days: 0       Anesthesia Type:   General    Operative Indications:  Status post lumbar spinal fusion [Z98 1]  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]  Lumbar stenosis with neurogenic claudication [X02 298]  Battery end of life of spinal cord stimulator [Z45 42]    Operative Findings:  Severe stenosis at L3-4, L2-3 and T12-L1 secondary to facet ligamentous hypertrophy  Electrophysiological monitoring remained stable  Intraoperative spinal cord stimulation produced adequate bilateral lower extremity coverage  Complications:   None    Procedure and Technique:  Clinical Note:    The goals and alternatives to the procedure above were discussed with patient and family  Surgery is intended to decompress neural structures and hopefully improve radicular pain  Weakness, numbness and back pain are less likely to improve  The risks of surgery were described in detail  1  Risk of general anesthetic, with possible cardiac and respiratory complication  There is risk of infection and bleeding  2  Risk of neurological injury with new pain, weakness or numbness or difficulties with bowel and bladder function  The risk of CSF leak was described  3  Possible need for revision surgery in the future  Once all questions were answered to their satisfaction, they asked to proceed with surgery  Operating Room Note    The patient was brought to the operating room and placed under general anesthetic  Once all appropriate lines were in position, the patient was carefully turned in the prone position onto a Javier  Care was taken to insure that all pressure points were padded and the neck was in a neutral position  AP and lateral fluoroscopy were used to  Identified midline of the lumbar spine and check sagital alignment  Based on lateral fluoroscopy a 12 centimeter midline incision was planned incorporating most of the previous lumbar incision and extending to the inferior aspect of the previous thoracic incision  The left buttock incision for the spinal cord stimulator IPG was identified as well  The skin was then prepped and draped in usual sterile fashion    A full surgical timeout was undertaken identifying the site, side and level of surgery  The patient received preoperative antibiotics  One percent lidocaine with 100,000 of epinephrine was used to infiltrate the soft tissue  A 10 blade was used to incise the skin over the left buttock IP G  Monopolar cautery was used to dissect down to the IPG pocket  The IPG was removed  Locking screws were loosened and the electrodes were removed from the IPG without difficulty  A new Medtronic IPG was placed with good impedances and good contact  The screwdriver used to lock the electrodes in place within the IPG  The surgical site was irrigated with antibiotic irrigation  Silk suture used to tack the IPG to the previous capsule  To a Vicryl suture used to approximate the capsule and the subcutaneous tissue  A running 4 Monocryl used to approximate the skin edges  Intraoperative stimulation through the new IPG into the existing thoracic spinal cord stimulator electrode provided good bilateral coverage  The surgical site was covered with blue towel  Next, a 10 blade was used to incise the skin  Monopolar cautery was used to dissect down and through the muscle fascia  Subperiosteal dissection was undertaken along the spinous processes, lamina and pars bilaterally using monopolar cautery and aquamantis  Care was taken over the L4-5 level as there had been a previous laminectomy at this level  The facet joint was identified bilaterally L4-5 and there appeared to be in excellent posterior lateral fusion mass at this level  Lateral fluoroscopy was then used to localize the surgical level  A  Matchstick nikolai was used to drill a  hole at the midpoint of the transverse process lateral to the pars on the left at T12  A pedicle finder was advanced under lateral fluoroscopic guidance  This image was used for definitive local localization with neuroradiology  The pedicle finder was removed and the hole was probed to ensure that there was a floor and 4 walls   A tap was then used to prepare the hole  A 4 5 x 45 millimeter pedicle screw was then placed under lateral fluoroscopic guidance  In a similar fashion, 4 5 x 45 mm screw was placed on the right at T12   5 5 x 45 mm screws were placed bilaterally at L1 and L2 while 6 5 x 45 mm screws were placed bilaterally at L3 and L4  At L5 a left 6 5 x 50 mm screw was placed while a 5 5 x 40 mm screw was placed on the right at L5  A smaller and shorter screw was needed on the right at L5 secondary to the positioning of the previous interbody cages at L4-5    AP and lateral fluoroscopy demonstrated good alignment of pedicle screws  All screws stimulated above threshold  Satisfied with instrumentation, I turned my attention to decompression  Leksell rongeur was used to remove the interspinous ligament from T12 down to L3-4  Anna bone cutter was used to remove the spinous processes of L1, L2 and L3  The inferior half of the lamina and spinous process of T12 was removed as well with Kerrison punch and Leksell rongeur  Curved curette was used to undermine the ligamentum flavum  Kerrison punch was then used to remove the overlying lamina  The medial facets were thinned bilaterally at L2-3, L3-4 and T12-L1 using a match stick bur  Curved curette was used to further undermine the ligamentum flavum and then medial facetectomy was undertaken at these levels  Afterwards the thecal sac was pulsating nicely  There was no further stenosis along the lateral recesses from T12 down to L3-4  The top half of the remaining lamina of L4 was removed  Savita and curved curette was used to dissect ligamentum flavum from the underlying dura  Ligamentum flavum was then removed  We then encountered epidural scar  There did not appear to be any further significant compression at the L3-4 level  Electrophysiological monitoring remained stable  One hundred twenty milliimeter rods were placed bilaterally   Set screws were applied and there was adequate karin above and below the construct bilaterally  Crosslinks were placed between the rods  Final AP and lateral fluoroscopy demonstrated good spinal alignment and placement of instrumentation  Electrophysiological monitoring remained stable  The set screws were finally tightened  The surgical site was irrigated copiously with antibiotic impregnated irrigation  The transverse processes and facet joints were decorticated with a matchstick drill  Locally harvested autograft and bone substitute were prepared in a bone mill and placed along the decorticated surface to promote bony fusion  A gram of vancomycin powder was placed in the epidural space  An epidural JAYDE drain was tunnelled to right of the incision  O Vicryl suture was used to approxiate the fascia and muscle while inverted 2-0 Vicryl was used to approximate the subcutaenous tissues  Stapler was used to approximate the skin edges  Miplex dressing was applied to both incisions  The count was correct at the end of the case and there were no complications  The patient was carefully transferred onto the operating gurney and extubated  The Bateman catheter was removed  The patient was transferred to the recovery room where they were noted to be hemodynamically stable and neurologically unchanged  The results of surgery were discussed with patient and family      I was present for the entire procedure    Patient Disposition:  PACU     SIGNATURE: Bhupinder Nieto MD  DATE: June 4, 2018  TIME: 1:59 PM

## 2018-06-04 NOTE — ANESTHESIA POSTPROCEDURE EVALUATION
Post-Op Assessment Note      CV Status:  Stable    Mental Status:  Alert and awake    Hydration Status:  Euvolemic and stable    PONV Controlled:  None    Airway Patency:  Patent    Post Op Vitals Reviewed: Yes          Staff: Anesthesiologist           /52 (06/04/18 1410)    Temp 97 5 °F (36 4 °C) (06/04/18 1410)    Pulse (!) 108 (06/04/18 1410)   Resp 15 (06/04/18 1410)    SpO2 100 % (Simultaneous filing  User may not have seen previous data ) (06/04/18 1410)      Pt transported to PACU extubated and on supplemental O2  Pt was awake, with stable vital signs  Signout was given to PACU RN

## 2018-06-04 NOTE — PROGRESS NOTES
Shameka Ibrahim with Neuro surgery made aware of pt's diabetic history  No need for blood glucose checks at this time  Will continue to monitor

## 2018-06-05 LAB
ABO GROUP BLD BPU: NORMAL
ABO GROUP BLD BPU: NORMAL
BILIRUB UR QL STRIP: NEGATIVE
BPU ID: NORMAL
BPU ID: NORMAL
CLARITY UR: CLEAR
COLOR UR: YELLOW
ERYTHROCYTE [DISTWIDTH] IN BLOOD BY AUTOMATED COUNT: 22.2 % (ref 11.6–15.1)
ERYTHROCYTE [DISTWIDTH] IN BLOOD BY AUTOMATED COUNT: 22.4 % (ref 11.6–15.1)
GLUCOSE SERPL-MCNC: 153 MG/DL (ref 65–140)
GLUCOSE SERPL-MCNC: 218 MG/DL (ref 65–140)
GLUCOSE UR STRIP-MCNC: NEGATIVE MG/DL
HCT VFR BLD AUTO: 27 % (ref 34.8–46.1)
HCT VFR BLD AUTO: 27.9 % (ref 34.8–46.1)
HGB BLD-MCNC: 7.9 G/DL (ref 11.5–15.4)
HGB BLD-MCNC: 8.1 G/DL (ref 11.5–15.4)
HGB UR QL STRIP.AUTO: NEGATIVE
KETONES UR STRIP-MCNC: NEGATIVE MG/DL
LEUKOCYTE ESTERASE UR QL STRIP: NEGATIVE
MCH RBC QN AUTO: 22.1 PG (ref 26.8–34.3)
MCH RBC QN AUTO: 23.1 PG (ref 26.8–34.3)
MCHC RBC AUTO-ENTMCNC: 28.3 G/DL (ref 31.4–37.4)
MCHC RBC AUTO-ENTMCNC: 30 G/DL (ref 31.4–37.4)
MCV RBC AUTO: 77 FL (ref 82–98)
MCV RBC AUTO: 78 FL (ref 82–98)
NITRITE UR QL STRIP: NEGATIVE
PH UR STRIP.AUTO: 5 [PH] (ref 4.5–8)
PLATELET # BLD AUTO: 312 THOUSANDS/UL (ref 149–390)
PLATELET # BLD AUTO: 376 THOUSANDS/UL (ref 149–390)
PMV BLD AUTO: 9.1 FL (ref 8.9–12.7)
PMV BLD AUTO: 9.8 FL (ref 8.9–12.7)
PROT UR STRIP-MCNC: NEGATIVE MG/DL
RBC # BLD AUTO: 3.51 MILLION/UL (ref 3.81–5.12)
RBC # BLD AUTO: 3.58 MILLION/UL (ref 3.81–5.12)
SP GR UR STRIP.AUTO: 1.02 (ref 1–1.03)
UNIT DISPENSE STATUS: NORMAL
UNIT DISPENSE STATUS: NORMAL
UNIT PRODUCT CODE: NORMAL
UNIT PRODUCT CODE: NORMAL
UNIT RH: NORMAL
UNIT RH: NORMAL
UROBILINOGEN UR QL STRIP.AUTO: 0.2 E.U./DL
WBC # BLD AUTO: 12.07 THOUSAND/UL (ref 4.31–10.16)
WBC # BLD AUTO: 9.15 THOUSAND/UL (ref 4.31–10.16)

## 2018-06-05 PROCEDURE — G8978 MOBILITY CURRENT STATUS: HCPCS

## 2018-06-05 PROCEDURE — 85027 COMPLETE CBC AUTOMATED: CPT | Performed by: NEUROLOGICAL SURGERY

## 2018-06-05 PROCEDURE — 81003 URINALYSIS AUTO W/O SCOPE: CPT | Performed by: PHYSICIAN ASSISTANT

## 2018-06-05 PROCEDURE — 82948 REAGENT STRIP/BLOOD GLUCOSE: CPT

## 2018-06-05 PROCEDURE — G8979 MOBILITY GOAL STATUS: HCPCS

## 2018-06-05 PROCEDURE — 85027 COMPLETE CBC AUTOMATED: CPT | Performed by: PHYSICIAN ASSISTANT

## 2018-06-05 PROCEDURE — 97161 PT EVAL LOW COMPLEX 20 MIN: CPT

## 2018-06-05 RX ORDER — HALOPERIDOL 5 MG/ML
2 INJECTION INTRAMUSCULAR
Status: DISCONTINUED | OUTPATIENT
Start: 2018-06-05 | End: 2018-06-08 | Stop reason: HOSPADM

## 2018-06-05 RX ORDER — LORAZEPAM 2 MG/ML
1 INJECTION INTRAMUSCULAR
Status: DISCONTINUED | OUTPATIENT
Start: 2018-06-05 | End: 2018-06-08 | Stop reason: HOSPADM

## 2018-06-05 RX ORDER — FENTANYL 50 UG/H
50 PATCH TRANSDERMAL
Status: DISCONTINUED | OUTPATIENT
Start: 2018-06-05 | End: 2018-06-08 | Stop reason: HOSPADM

## 2018-06-05 RX ORDER — GLYCOPYRROLATE 0.2 MG/ML
0.2 INJECTION INTRAMUSCULAR; INTRAVENOUS EVERY 4 HOURS PRN
Status: DISCONTINUED | OUTPATIENT
Start: 2018-06-05 | End: 2018-06-08 | Stop reason: HOSPADM

## 2018-06-05 RX ORDER — SODIUM CHLORIDE 9 MG/ML
125 INJECTION, SOLUTION INTRAVENOUS CONTINUOUS
Status: DISCONTINUED | OUTPATIENT
Start: 2018-06-05 | End: 2018-06-06

## 2018-06-05 RX ORDER — ACETAMINOPHEN 160 MG/5ML
975 SUSPENSION, ORAL (FINAL DOSE FORM) ORAL EVERY 8 HOURS
Status: DISCONTINUED | OUTPATIENT
Start: 2018-06-05 | End: 2018-06-08 | Stop reason: HOSPADM

## 2018-06-05 RX ORDER — DIPHENHYDRAMINE HCL 25 MG
25 TABLET ORAL EVERY 6 HOURS PRN
Status: DISCONTINUED | OUTPATIENT
Start: 2018-06-05 | End: 2018-06-08 | Stop reason: HOSPADM

## 2018-06-05 RX ADMIN — GABAPENTIN 300 MG: 300 CAPSULE ORAL at 17:04

## 2018-06-05 RX ADMIN — OXYCODONE HYDROCHLORIDE 15 MG: 10 TABLET ORAL at 17:04

## 2018-06-05 RX ADMIN — HYDROMORPHONE HYDROCHLORIDE 0.5 MG: 1 INJECTION, SOLUTION INTRAMUSCULAR; INTRAVENOUS; SUBCUTANEOUS at 06:51

## 2018-06-05 RX ADMIN — HYDROMORPHONE HYDROCHLORIDE 0.5 MG: 1 INJECTION, SOLUTION INTRAMUSCULAR; INTRAVENOUS; SUBCUTANEOUS at 03:59

## 2018-06-05 RX ADMIN — FERROUS SULFATE TAB 325 MG (65 MG ELEMENTAL FE) 325 MG: 325 (65 FE) TAB at 17:05

## 2018-06-05 RX ADMIN — HEPARIN SODIUM 5000 UNITS: 5000 INJECTION, SOLUTION INTRAVENOUS; SUBCUTANEOUS at 10:44

## 2018-06-05 RX ADMIN — HYDROMORPHONE HYDROCHLORIDE 0.5 MG: 1 INJECTION, SOLUTION INTRAMUSCULAR; INTRAVENOUS; SUBCUTANEOUS at 12:22

## 2018-06-05 RX ADMIN — HEPARIN SODIUM 5000 UNITS: 5000 INJECTION, SOLUTION INTRAVENOUS; SUBCUTANEOUS at 21:29

## 2018-06-05 RX ADMIN — SODIUM CHLORIDE 125 ML/HR: 0.9 INJECTION, SOLUTION INTRAVENOUS at 19:33

## 2018-06-05 RX ADMIN — OXYCODONE HYDROCHLORIDE 10 MG: 10 TABLET ORAL at 02:45

## 2018-06-05 RX ADMIN — VALSARTAN 40 MG: 80 TABLET ORAL at 08:01

## 2018-06-05 RX ADMIN — GABAPENTIN 300 MG: 300 CAPSULE ORAL at 08:01

## 2018-06-05 RX ADMIN — ACETAMINOPHEN 650 MG: 160 SUSPENSION ORAL at 02:45

## 2018-06-05 RX ADMIN — ACETAMINOPHEN 975 MG: 160 SUSPENSION ORAL at 23:46

## 2018-06-05 RX ADMIN — OXYBUTYNIN CHLORIDE 15 MG: 5 TABLET, EXTENDED RELEASE ORAL at 21:29

## 2018-06-05 RX ADMIN — HYDROMORPHONE HYDROCHLORIDE 0.5 MG: 1 INJECTION, SOLUTION INTRAMUSCULAR; INTRAVENOUS; SUBCUTANEOUS at 14:12

## 2018-06-05 RX ADMIN — MECLIZINE HYDROCHLORIDE 25 MG: 25 TABLET ORAL at 21:29

## 2018-06-05 RX ADMIN — HYDROMORPHONE HYDROCHLORIDE 0.5 MG: 1 INJECTION, SOLUTION INTRAMUSCULAR; INTRAVENOUS; SUBCUTANEOUS at 19:32

## 2018-06-05 RX ADMIN — DICYCLOMINE HYDROCHLORIDE 10 MG: 10 CAPSULE ORAL at 19:43

## 2018-06-05 RX ADMIN — ACETAMINOPHEN 650 MG: 160 SUSPENSION ORAL at 08:02

## 2018-06-05 RX ADMIN — ACETAMINOPHEN 975 MG: 160 SUSPENSION ORAL at 17:05

## 2018-06-05 RX ADMIN — OXYCODONE HYDROCHLORIDE 15 MG: 10 TABLET ORAL at 10:44

## 2018-06-05 RX ADMIN — MECLIZINE HYDROCHLORIDE 25 MG: 25 TABLET ORAL at 05:39

## 2018-06-05 RX ADMIN — METFORMIN HYDROCHLORIDE 1000 MG: 500 TABLET, FILM COATED ORAL at 08:02

## 2018-06-05 RX ADMIN — DOCUSATE SODIUM 100 MG: 100 CAPSULE, LIQUID FILLED ORAL at 08:02

## 2018-06-05 RX ADMIN — MECLIZINE HYDROCHLORIDE 25 MG: 25 TABLET ORAL at 14:12

## 2018-06-05 RX ADMIN — FENTANYL 50 MCG: 50 PATCH, EXTENDED RELEASE TRANSDERMAL at 12:51

## 2018-06-05 RX ADMIN — DOCUSATE SODIUM 100 MG: 100 CAPSULE, LIQUID FILLED ORAL at 17:05

## 2018-06-05 RX ADMIN — METHOCARBAMOL 500 MG: 500 TABLET ORAL at 11:50

## 2018-06-05 RX ADMIN — INSULIN LISPRO 1 UNITS: 100 INJECTION, SOLUTION INTRAVENOUS; SUBCUTANEOUS at 21:33

## 2018-06-05 RX ADMIN — METFORMIN HYDROCHLORIDE 1000 MG: 500 TABLET, FILM COATED ORAL at 17:04

## 2018-06-05 RX ADMIN — METHOCARBAMOL 500 MG: 500 TABLET ORAL at 17:04

## 2018-06-05 RX ADMIN — Medication 100 MG: at 08:05

## 2018-06-05 RX ADMIN — SODIUM CHLORIDE, SODIUM LACTATE, POTASSIUM CHLORIDE, AND CALCIUM CHLORIDE 100 ML/HR: .6; .31; .03; .02 INJECTION, SOLUTION INTRAVENOUS at 01:16

## 2018-06-05 RX ADMIN — PANTOPRAZOLE SODIUM 40 MG: 40 TABLET, DELAYED RELEASE ORAL at 17:04

## 2018-06-05 RX ADMIN — PRAVASTATIN SODIUM 40 MG: 40 TABLET ORAL at 17:04

## 2018-06-05 RX ADMIN — VANCOMYCIN HYDROCHLORIDE 1000 MG: 1 INJECTION, SOLUTION INTRAVENOUS at 01:19

## 2018-06-05 RX ADMIN — METHOCARBAMOL 500 MG: 500 TABLET ORAL at 05:39

## 2018-06-05 RX ADMIN — SENNOSIDES 8.6 MG: 8.6 TABLET, FILM COATED ORAL at 08:02

## 2018-06-05 RX ADMIN — PANTOPRAZOLE SODIUM 40 MG: 40 TABLET, DELAYED RELEASE ORAL at 08:02

## 2018-06-05 RX ADMIN — KETAMINE HYDROCHLORIDE 0.1 MG/KG/HR: 50 INJECTION, SOLUTION INTRAMUSCULAR; INTRAVENOUS at 14:13

## 2018-06-05 RX ADMIN — FERROUS SULFATE TAB 325 MG (65 MG ELEMENTAL FE) 325 MG: 325 (65 FE) TAB at 08:02

## 2018-06-05 RX ADMIN — LEVOTHYROXINE SODIUM 25 MCG: 25 TABLET ORAL at 05:39

## 2018-06-05 RX ADMIN — METHOCARBAMOL 500 MG: 500 TABLET ORAL at 23:47

## 2018-06-05 NOTE — POST OP PROGRESS NOTES
Progress Note - Neurosurgery   Alfonzo Shelton 74 y o  female MRN: [id number]  Unit/Bed#: -01 Encounter: [id number]    Assessment:  1  POD1 revision lumbar fusion with extension from T12-L5 with decompression T12-L4  2  POD1 replacement of spinal cord stimulator IPG  3  Failed back surgical syndrome  4  Lumbar stenosis with neurogenic claudication  5  Opioid dependence  6  Fibromyalgia  7  Type 2 diabetes  8  Asthma without exacerbation  9  Hypertension  10  Hyperlipidemia    Plan:  · Exam reveals good strength throughout BLE  Sensation intact LT BLE  JPS intact great toes  · Dressing CDI  Maintain current dressing  · Imaging reviewed personally and by attending  Final results as below  · Upright lumbar spine x-rays 6/4/18:  Hardware extending from T12 through L5  Satisfactory placement of hardware and overall spinal alignment  · Postoperative management   · Luis-Neves drain 410ml since surgery  Plan to change suction to thumb print this afternoon  Order placed  · Pain control - acute pain service consulted but pending  Change Tylenol to 975 mg every 8 hours scheduled  · May benefit from increasing Robaxin or discontinuation Robaxin restarting Home skelaxin  · Continue current Oxycodone and IV Dilaudid dose at this time  · Consider need to restart Fentanyl 50mcg/hr  · Mobilize with physical therapy  Evaluation pending  · DVT PPX:  Heparin and SCDs on during exam  · Medical management   · Diabetes - Continue home regimen  Ordered Accucheck and ISS   · Anemia -may be partially delusional   Repeat labs this afternoon  CBC also ordered for a m  Issa Overgaard Continue iron and stool softener  · Will transfuse as needed for symptoms or hemoglobin less than seven  · Leukocytosis likely reactive - repeat CBC this afternoon  · Disposition planning: requires ongoing hospitalization for drain management, monitoring of anemia, pain control and therapy evaluation       Subjective/Objective   Chief Complaint: "This is painful"/postoperative follow-up    Subjective: Patient complaining of incisional/back pain with limited improvement on oxycodone 10mg  Will trial Oxycodone 15mg  Denies leg pain, numbness, or tingling  States leg continue to feel weak when first out of bed  Denies CP/SOB/N/V  Voiding well  No BM  Bleching  Tolerating PO  Objective: Lying in bed  NAD  I/O       06/03 0701 - 06/04 0700 06/04 0701 - 06/05 0700 06/05 0701 - 06/06 0700    P  O   700     I V  (mL/kg)  3750 (43 4) 676 7 (7 8)    Blood  700     Total Intake(mL/kg)  5150 (59 6) 676 7 (7 8)    Urine (mL/kg/hr)  1850 (0 9) 50 (0 2)    Drains  410 (0 2) 45 (0 2)    Blood  800 (0 4)     Total Output   3060 95    Net   +2090 +581 7                 Invasive Devices     Peripheral Intravenous Line            Peripheral IV 06/04/18 Left Hand 1 day    Peripheral IV 06/04/18 Right Forearm 1 day    Peripheral IV 06/04/18 Right Hand 1 day          Drain            Closed/Suction Drain Right Back Bulb less than 1 day                Physical Exam:  Vitals: Blood pressure 122/60, pulse 99, temperature 98 3 °F (36 8 °C), temperature source Oral, resp  rate 20, height 5' (1 524 m), weight 86 4 kg (190 lb 7 6 oz), SpO2 96 %  ,Body mass index is 37 2 kg/m²  General appearance: alert, appears stated age, cooperative and no distress  Head: Normocephalic, without obvious abnormality, atraumatic  Eyes: EOMI  Neck: supple, symmetrical, trachea midline   Back: Incision CDI   JAYDE drain with serosanguineous output  Lungs: non labored breathing  Heart: regular heart rate  Neurologic:   Mental status: Alert, oriented, thought content appropriate  Sensory: normal to LT  Motor: moving all extremities with good strength  JPS intact b/l hallus    Lab Results:    Results from last 7 days  Lab Units 06/05/18  0528 06/04/18  2256 06/04/18  1253 06/04/18  1052   WBC Thousand/uL 12 07*  --   --   --    HEMOGLOBIN g/dL 7 9*  --   --   --    I STAT HEMOGLOBIN g/dl  --   --  8 8* 7 8*   HEMATOCRIT % 27 9*  --   --   --    PLATELETS Thousands/uL 376 381  --   --        Results from last 7 days  Lab Units 06/04/18  2256 06/04/18  1253 06/04/18  1052   SODIUM mmol/L 137  --   --    POTASSIUM mmol/L 4 4  --   --    CHLORIDE mmol/L 106  --   --    CO2 mmol/L 26  --   --    BUN mg/dL 19  --   --    CREATININE mg/dL 0 91  --   --    CALCIUM mg/dL 8 1*  --   --    GLUCOSE RANDOM mg/dL 183*  --   --    GLUCOSE, ISTAT mg/dl  --  193* 179*                 No results found for: TROPONINT  ABG:No results found for: PHART, QZH5RTM, PO2ART, FNF5ANN, X4VPUZGH, BEART, SOURCE    Imaging Studies: I have personally reviewed pertinent reports  and I have personally reviewed pertinent films in PACS    XR lumbar spine 2 or 3 views   Final Result by Harry Travis DO (06/05 1384)   Interval surgery with posterior fusion T12-L5  Workstation performed: [id number]         XR spine lumbar 2 or 3 views injury   Final Result by Jose Darnell MD (53/98 1599)      Fluoroscopic guidance provided for surgical procedure  Posterior fusion T12-L5 interconnecting rods and bilateral pedicle screws  Interbody cages at L4 placed are stable  Please refer to the separate procedure notes for additional details  Workstation performed: [id number]           EKG, Pathology, and Other Studies: I have personally reviewed pertinent reports        VTE Pharmacologic Prophylaxis: Heparin    VTE Mechanical Prophylaxis: sequential compression device

## 2018-06-05 NOTE — CASE MANAGEMENT
Thank you,  [address]  [facility], [place], operated by [facility] in the [facility] by [address] for 2017  Network Utilization Review Department  Phone: [phone]; Fax [phone]  ATTENTION: The Network Utilization Review Department is now centralized for our 7 Facilities  Make a note that we have a new phone and fax numbers for our Department  Please call with any questions or concerns to [phone] and carefully follow the prompts so that you are directed to the right person  All voicemails are confidential  Fax any determinations, approvals, denials, and requests for initial or continue stay review clinical to [phone]  Due to HIGH CALL volume, it would be easier if you could please send faxed requests to expedite your requests and in part, help us provide discharge notifications faster     ===================================================================================    Initial Clinical Review    Age/Sex: 74 y o  female    Surgery Date: 06/04/2018    Procedure: 1  Reopening of left buttock incision for removal of spinal cord stimulator IPG and placement of new Medtronic IPG ([id number])  2  Reopening of lumbar incision for inspection of previous L4-5 fusion and extension of fusion from T12-L5 with Medtronic Solara system, locally harvested autograft, Mastergraft  3   T12-L4 posterior decompression with bilateral foraminotomies      Anesthesia: General    Admission Orders: Date/Time/Statement: 6/4/18 @ 1636  Orders Placed This Encounter   Procedures    Inpatient Admission     Standing Status:   Standing     Number of Occurrences:   1     Order Specific Question:   Admitting Physician     Answer:   Danilo Dyer     Order Specific Question:   Level of Care     Answer:   Med Surg [16]     Order Specific Question:   Estimated length of stay     Answer:   More than 2 Midnights     Order Specific Question:   Certification     Answer:   I certify that inpatient services are medically necessary for this patient for a duration of greater than two midnights  See H&P and MD Progress Notes for additional information about the patient's course of treatment  Vital Signs: /66 (BP Location: Right arm)   Pulse 101   Temp 98 3 °F (36 8 °C) (Oral)   Resp 17   Ht 5' (1 524 m)   Wt 86 4 kg (190 lb 7 6 oz)   LMP  (LMP Unknown)   SpO2 99%   BMI 37 20 kg/m²     Diet:        Diet Orders            Start     Ordered    06/04/18 1639  Diet Regular; Regular House  Diet effective 1400     Question Answer Comment   Diet Type Regular    Regular Regular House    RD to adjust diet per protocol?  Yes        06/04/18 1638      Neuro checks q4h  Glucose finger stick QID  Consult PT    Mobility:  OOB TID     DVT Prophylaxis: [place] SCDs    Pain Control:   Pain Medications             gabapentin (NEURONTIN) 100 mg capsule Take 100 mg by mouth 3 (three) times a day as needed    meloxicam (MOBIC) 15 mg tablet Take 1 tablet (15 mg total) by mouth daily for 30 days    metaxalone (SKELAXIN) 800 mg tablet Take 1 tablet (800 mg total) by mouth 3 (three) times a day for 30 days    oxyCODONE-acetaminophen (PERCOCET)  mg per tablet Take 1 tablet by mouth every 6 (six) hours as needed for moderate pain Max Daily Amount: 4 tablets        Scheduled Meds:  Current Facility-Administered Medications:  acetaminophen 975 mg Oral Q8H   dicyclomine 10 mg Oral TID PRN   diphenhydrAMINE 25 mg Oral Q6H PRN   docusate sodium 100 mg Oral BID   fentaNYL 50 mcg Transdermal Q72H   ferrous sulfate 325 mg Oral BID With Meals   gabapentin 300 mg Oral BID   glycopyrrolate 0 2 mg Intravenous Q4H PRN   GuaiFENesin 400 mg Oral Daily PRN   haloperidol lactate 2 mg Intramuscular Q30 Min PRN   heparin (porcine) 5,000 Units Subcutaneous Q8H [facility]   HYDROmorphone 0 5 mg Intravenous Q1H PRN   insulin lispro 1-5 Units Subcutaneous TID AC   insulin lispro 1-5 Units Subcutaneous HS   ketamine 0 1 mg/kg/hr Intravenous Continuous latanoprost 1 drop Both Eyes HS   levothyroxine 25 mcg Oral Early Morning   loratadine 10 mg Oral Daily PRN   LORazepam 1 mg Intravenous Q1H PRN   meclizine 25 mg Oral Q8H [facility]   metFORMIN 1,000 mg Oral BID With Meals   methocarbamol 500 mg Oral Q6H [facility]   Milnacipran HCl 1 tablet Oral Daily   ondansetron 4 mg Intravenous Q6H PRN   oxybutynin 15 mg Oral HS   oxyCODONE 10 mg Oral Q4H PRN   oxyCODONE 15 mg Oral Q4H PRN   pantoprazole 40 mg Oral BID AC   pravastatin 40 mg Oral Daily With Dinner   pyridoxine 100 mg Oral Daily   senna 1 tablet Oral Daily   valsartan 40 mg Oral Daily     Continuous Infusions:  ketamine 0 1 mg/kg/hr

## 2018-06-05 NOTE — PROGRESS NOTES
Patient has HR in the 110-117, and a temp of 100 3-100 6 for the last two checks  Tylenol was administered with no change to temp    Alek Roman surgical PA covering for neuro sx spoken with and will investigate patients chart and input orders

## 2018-06-05 NOTE — SOCIAL WORK
Cm met with patient to review role of Cm  Patient reportedly lives with son Aline Lang in a 1st floor Condo with one step up into home  Patient's daughter Marino Goodman, [phone] in the room reported that she is patient's medical POA  Patient was resting in the room and the daugter reported that patient is generally alert and oriented at home  Patient is reportedly independent with ADLS PTA  Patient denied having any inpatient PT/OT, inpatient MH, substance abuse treatment or [place] [address] services in the past  Patient reportedly has a RW, cane, bedside commode and shower chair at home  [facility]  Patient's PCP is through [facility]  Patient and daughter would like for patient to d/c home with [place] [address] services  Patient and daughter in agreement with a referral to [facility]  Cm to place referral  CM reviewed d/c planning process including the following: identifying help at home, patient preference for d/c planning needs, Discharge Lounge, [facility] Meds to Bed program, availability of treatment team to discuss questions or concerns patient and/or family may have regarding understanding medications and recognizing signs and symptoms once discharged  CM also encouraged patient to follow up with all recommended appointments after discharge  Patient advised of importance for patient and family to participate in managing patients medical well being

## 2018-06-05 NOTE — PLAN OF CARE
Problem: PHYSICAL THERAPY ADULT  Goal: Performs mobility at highest level of function for planned discharge setting  See evaluation for individualized goals  Prognosis: Good  Problem List: Decreased strength, Decreased endurance, Impaired balance, Decreased mobility, Pain  Assessment: Pt presents s/p POD1 revision lumbar fusion with extension from T12-L5 with decompression T12-L4,POD1 replacement of spinal cord stimulator IPG, failed back surgical syndrome, lumbar stenosis with neurogenic claudication, opioid dependence, fibromyalgia, and type 2 DM  Pt also presents w/dec activity tolerance and fatigue, dec BLE strength, dec standing dynamic balance and dec ambulatory balance, and currently benefits from use of RW to inc balance and safety  Pt currently needs the most physical A for bed mobility to maintain log rolling due to pain and weakness; reviewed technique with pt and daughter and provided pt ed that she may need help with this at home based upon progress  Pt lives with her son and pt's daughter reported that he will be able to help with meal prep and household chores, along with other family members  Currently recommending pt cont to be seen by PT while here in the hospital with planning for d/c home with home PT and family support, pending progress and medical needs  Pt reported she would prepfer to d/c home with home PT and family support; cont to see how per functional mobility progresses while here in the hospital          Recommendation: Home PT, Home with family support          See flowsheet documentation for full assessment

## 2018-06-05 NOTE — PHYSICAL THERAPY NOTE
Physical Therapy Eval     Patient Name: Maria M Salamanca    CSGKA'C Date: 6/5/2018     Problem List  Patient Active Problem List   Diagnosis    Pain in right wrist    Failed back surgical syndrome    Status post lumbar spinal fusion    Lumbar stenosis with neurogenic claudication    Battery end of life of spinal cord stimulator    Compression of spinal cord [facility])        Past Medical History  Past Medical History:   Diagnosis Date    Acid reflux     Anxiety     Arthritis     Asthma     Chronic narcotic dependence ([facility] [address] )     Chronic pain     Colon polyp     Cystocele     Diabetes mellitus ([facility] [address] )     Diverticulosis     Dysfunctional uterine bleeding     last assessed - [id number]    Fibromyalgia     Gastric ulcer     Gastroparesis     History of colonic polyps     last assessed - [id number]    History of gastroesophageal reflux (GERD)     Hypercholesterolemia     Hyperlipidemia     Hypertension     IBS (irritable bowel syndrome)     Kidney stone     Post laminectomy syndrome     Seasonal allergies     Spinal stenosis         Past Surgical History  Past Surgical History:   Procedure Laterality Date    APPENDECTOMY      BACK SURGERY      CHOLECYSTECTOMY      ESOPHAGOGASTRODUODENOSCOPY N/A 9/28/2016    Procedure: ESOPHAGOGASTRODUODENOSCOPY (EGD); Surgeon: Kevin Sheets MD;  [facility];   Service:     HERNIA REPAIR      HYSTERECTOMY      TTAH-BSO last assessed - [id number]    LAMINECTOMY      LUMBAR LAMINECTOMY      LA ARTHRODESIS POSTERIOR/POSTEROLATERAL THORACIC N/A 6/4/2018    Procedure: Reopening of lumbar incision for T12-L5 posterior instrumented fixation and fusion and T12-L4 posterior decompression;  Surgeon: Annalee Boyd MD;  [facility];  Service: Neurosurgery    LA COLONOSCOPY FLX DX W/COLLJ SPEC WHEN PFRMD N/A 3/2/2016    Procedure: EGD AND COLONOSCOPY;  Surgeon: Kevin Sheets MD;  [facility]: [facility]; Service: Gastroenterology    NC DILATE ESOPHAGUS N/A 9/28/2016    Procedure: DILATATION ESOPHAGEAL;  Surgeon: Ty Johns MD;  [facility]; Service: Gastroenterology    NC ESOPHAGOGASTRODUODENOSCOPY TRANSORAL DIAGNOSTIC N/A 7/18/2016    Procedure: ESOPHAGOGASTRODUODENOSCOPY (EGD); Surgeon: Ty Johns MD;  [facility]: [facility]; Service: Gastroenterology    NC IMPLANT SPINAL NEUROSTIM/ Left 6/4/2018    Procedure: removal of left buttock implantable pulse generator and placement of new  implantable pulse generator;  Surgeon: Marvel Saab MD;  [facility];  Service: Neurosurgery         06/05/18 1320   Pain Assessment   Pain Score 5   Pain Type Surgical pain   Pain Location Back   Pain Orientation Lower;Mid   Hospital Pain Intervention(s) Repositioned  (pt assisted back to bed at end of session)   Home Living   Type of [address] [place] [address] One level; Able to live on main level with bedroom/bathroom  (1 curb step to enter )   Home Equipment (rolling walker)   Prior Function   Level of [place] Independent with ADLs and functional mobility   Lives With Spouse   Receives Help From Family   Restrictions/Precautions   Other Precautions Fall Risk;Pain   Cognition   Arousal/Participation Cooperative   RLE Assessment   RLE Assessment (gross 3/5 MMT)   LLE Assessment   LLE Assessment (gross 3/5 MMT)   Coordination   Movements are Fluid and Coordinated 1   Light Touch   RLE Light Touch Grossly intact   LLE Light Touch Grossly intact   Proprioception   RLE Proprioception Grossly intact   LLE Proprioception Grossly Intact   Bed Mobility   Rolling R (not tested due to pain)   Rolling L 5  Supervision   Supine to Sit 3  Moderate assistance   Sit to Supine 2  Maximal assistance   Additional Comments log rolling for bed mobility  Discussed with pt and daughter if pt were to d/c home from the hospital, to anticipate need for A with bed mobility      Transfers   Sit to Stand 4  Minimal assistance   Stand to Sit 4  Minimal assistance   Stand pivot 4  Minimal assistance   Toilet transfer 4  Minimal assistance   Additional Comments use of RW for transfers, hands on hips for balance support   Ambulation/Elevation   Gait pattern Improper Weight shift;Decreased foot clearance; Excessively slow   Gait Assistance 4  Minimal assist   Additional items Assist x 1   Assistive Device Rolling walker   Distance 90  (100)   Stair Management Assistance 4  Minimal assist   Additional items Assist x 1   Stair Management Technique Two rails; Foreward;Backward  (up forward, down backward )   Number of Stairs 2   Balance   Static Sitting Good   Dynamic Sitting Fair +   Static Standing Fair   Dynamic Standing [address],4, & 5 +  (with RW )   Activity Tolerance   Nurse One Lorraine Meyer RN   Assessment   Prognosis Good   Problem List Decreased strength;Decreased endurance; Impaired balance;Decreased mobility;Pain   Assessment Pt presents s/p POD1 revision lumbar fusion with extension from T12-L5 with decompression T12-L4,POD1 replacement of spinal cord stimulator IPG, failed back surgical syndrome, lumbar stenosis with neurogenic claudication, opioid dependence, fibromyalgia, and type 2 DM  Pt also presents w/dec activity tolerance and fatigue, dec BLE strength, dec standing dynamic balance and dec ambulatory balance, and currently benefits from use of RW to inc balance and safety  Pt currently needs the most physical A for bed mobility to maintain log rolling due to pain and weakness; reviewed technique with pt and daughter and provided pt ed that she may need help with this at home based upon progress  Pt lives with her son and pt's daughter reported that he will be able to help with meal prep and household chores, along with other family members   Currently recommending pt cont to be seen by PT while here in the hospital with planning for d/c home with home PT and family support, pending progress and medical needs  Pt reported she would prepfer to d/c home with home PT and family support; cont to see how per functional mobility progresses while here in the hospital     Goals   Patient Goals to get better and go home soon   LTG Expiration Date 06/11/18   Long Term Goal #1 Pt will demonstrate improvement in activity tolerance, BLE strength and safety by being able to ambulate 150' at April level with RW  Long Term Goal #2 Pt will demonstrate improvement in strength, activity tolerance and balance by being able to perform bed mobility at S level via log rolling  Plan   Treatment/Interventions Functional transfer training;LE strengthening/ROM; Elevations; Therapeutic exercise; Endurance training;Patient/family training;Equipment eval/education; Bed mobility;Gait training   PT Frequency 5x/wk   Recommendation   Recommendation Home PT; Home with family support   Equipment Recommended (pt has RW at home )   Barthel Index   Feeding 10   Bathing 0   Grooming Score 5   Dressing Score 5   Bladder Score 10   Bowels Score 10   Toilet Use Score 5   Transfers (Bed/Chair) Score 10   Mobility (Level Surface) Score 0   Stairs Score 5   Barthel Index Score 60

## 2018-06-05 NOTE — SOCIAL WORK
MCG Guide Used for Initial Round: Lumbar Fusion  Optimal GLOS: 3  Hospital Day: 1 day  DC Readiness: Discharge Readiness  Return to top of Lumbar Fusion RRG - ISC  · Discharge readiness is indicated by patient meeting Recovery Milestones, including ALL of the following:  ? Hypotension absent  ? No new neurologic deficit  ? No evidence of postoperative or surgical site infection  ? Voiding ability at baseline  ? Pain absent or managed  ? Ambulatory[I]  ? Oral hydration, medications, and diet  ?  Discharge plans and education understood    Identified Barriers: POD1- pain mgmnt consult, SC Stimulator, JAYDE drain, anemia  Discussion Date (Time): 06/05/18 with chart review

## 2018-06-05 NOTE — PROGRESS NOTES
Rounded with COOPER Nobles with neurosurgery  IVF stopped  Will await pain mgmt service to evaluate pain med regimen

## 2018-06-05 NOTE — PROGRESS NOTES
Notified by RN that patient has slightly elevated temp and heart rate  She is not presently getting any IV fluids, and may be slightly dehydrated  Will restart IV flds and monitor vitals

## 2018-06-06 LAB
ERYTHROCYTE [DISTWIDTH] IN BLOOD BY AUTOMATED COUNT: 22.9 % (ref 11.6–15.1)
ERYTHROCYTE [DISTWIDTH] IN BLOOD BY AUTOMATED COUNT: 23.1 % (ref 11.6–15.1)
GLUCOSE SERPL-MCNC: 155 MG/DL (ref 65–140)
GLUCOSE SERPL-MCNC: 169 MG/DL (ref 65–140)
GLUCOSE SERPL-MCNC: 173 MG/DL (ref 65–140)
GLUCOSE SERPL-MCNC: 183 MG/DL (ref 65–140)
HCT VFR BLD AUTO: 25 % (ref 34.8–46.1)
HCT VFR BLD AUTO: 25.7 % (ref 34.8–46.1)
HGB BLD-MCNC: 7.1 G/DL (ref 11.5–15.4)
HGB BLD-MCNC: 7.4 G/DL (ref 11.5–15.4)
MCH RBC QN AUTO: 22.5 PG (ref 26.8–34.3)
MCH RBC QN AUTO: 22.9 PG (ref 26.8–34.3)
MCHC RBC AUTO-ENTMCNC: 28.4 G/DL (ref 31.4–37.4)
MCHC RBC AUTO-ENTMCNC: 28.8 G/DL (ref 31.4–37.4)
MCV RBC AUTO: 79 FL (ref 82–98)
MCV RBC AUTO: 80 FL (ref 82–98)
PLATELET # BLD AUTO: 302 THOUSANDS/UL (ref 149–390)
PLATELET # BLD AUTO: 309 THOUSANDS/UL (ref 149–390)
PMV BLD AUTO: 10.1 FL (ref 8.9–12.7)
PMV BLD AUTO: 9.6 FL (ref 8.9–12.7)
RBC # BLD AUTO: 3.15 MILLION/UL (ref 3.81–5.12)
RBC # BLD AUTO: 3.23 MILLION/UL (ref 3.81–5.12)
WBC # BLD AUTO: 10.31 THOUSAND/UL (ref 4.31–10.16)
WBC # BLD AUTO: 11.26 THOUSAND/UL (ref 4.31–10.16)

## 2018-06-06 PROCEDURE — 85027 COMPLETE CBC AUTOMATED: CPT | Performed by: PHYSICIAN ASSISTANT

## 2018-06-06 PROCEDURE — 82948 REAGENT STRIP/BLOOD GLUCOSE: CPT

## 2018-06-06 RX ADMIN — PANTOPRAZOLE SODIUM 40 MG: 40 TABLET, DELAYED RELEASE ORAL at 06:21

## 2018-06-06 RX ADMIN — HEPARIN SODIUM 5000 UNITS: 5000 INJECTION, SOLUTION INTRAVENOUS; SUBCUTANEOUS at 21:39

## 2018-06-06 RX ADMIN — MECLIZINE HYDROCHLORIDE 25 MG: 25 TABLET ORAL at 21:39

## 2018-06-06 RX ADMIN — HEPARIN SODIUM 5000 UNITS: 5000 INJECTION, SOLUTION INTRAVENOUS; SUBCUTANEOUS at 06:21

## 2018-06-06 RX ADMIN — OXYCODONE HYDROCHLORIDE 15 MG: 10 TABLET ORAL at 20:05

## 2018-06-06 RX ADMIN — DOCUSATE SODIUM 100 MG: 100 CAPSULE, LIQUID FILLED ORAL at 08:01

## 2018-06-06 RX ADMIN — HYDROMORPHONE HYDROCHLORIDE 0.5 MG: 1 INJECTION, SOLUTION INTRAMUSCULAR; INTRAVENOUS; SUBCUTANEOUS at 16:55

## 2018-06-06 RX ADMIN — INSULIN LISPRO 1 UNITS: 100 INJECTION, SOLUTION INTRAVENOUS; SUBCUTANEOUS at 17:44

## 2018-06-06 RX ADMIN — GABAPENTIN 300 MG: 300 CAPSULE ORAL at 08:00

## 2018-06-06 RX ADMIN — SENNOSIDES 8.6 MG: 8.6 TABLET, FILM COATED ORAL at 08:01

## 2018-06-06 RX ADMIN — LEVOTHYROXINE SODIUM 25 MCG: 25 TABLET ORAL at 06:21

## 2018-06-06 RX ADMIN — SODIUM CHLORIDE 125 ML/HR: 0.9 INJECTION, SOLUTION INTRAVENOUS at 03:35

## 2018-06-06 RX ADMIN — HYDROMORPHONE HYDROCHLORIDE 0.5 MG: 1 INJECTION, SOLUTION INTRAMUSCULAR; INTRAVENOUS; SUBCUTANEOUS at 21:39

## 2018-06-06 RX ADMIN — METHOCARBAMOL 500 MG: 500 TABLET ORAL at 06:21

## 2018-06-06 RX ADMIN — FERROUS SULFATE TAB 325 MG (65 MG ELEMENTAL FE) 325 MG: 325 (65 FE) TAB at 07:51

## 2018-06-06 RX ADMIN — LATANOPROST 1 DROP: 50 SOLUTION OPHTHALMIC at 21:40

## 2018-06-06 RX ADMIN — MECLIZINE HYDROCHLORIDE 25 MG: 25 TABLET ORAL at 06:21

## 2018-06-06 RX ADMIN — PANTOPRAZOLE SODIUM 40 MG: 40 TABLET, DELAYED RELEASE ORAL at 16:59

## 2018-06-06 RX ADMIN — METFORMIN HYDROCHLORIDE 1000 MG: 500 TABLET, FILM COATED ORAL at 07:51

## 2018-06-06 RX ADMIN — VALSARTAN 40 MG: 80 TABLET ORAL at 08:01

## 2018-06-06 RX ADMIN — MECLIZINE HYDROCHLORIDE 25 MG: 25 TABLET ORAL at 13:41

## 2018-06-06 RX ADMIN — OXYBUTYNIN CHLORIDE 15 MG: 5 TABLET, EXTENDED RELEASE ORAL at 21:39

## 2018-06-06 RX ADMIN — HEPARIN SODIUM 5000 UNITS: 5000 INJECTION, SOLUTION INTRAVENOUS; SUBCUTANEOUS at 13:41

## 2018-06-06 RX ADMIN — OXYCODONE HYDROCHLORIDE 15 MG: 10 TABLET ORAL at 15:34

## 2018-06-06 RX ADMIN — DOCUSATE SODIUM 100 MG: 100 CAPSULE, LIQUID FILLED ORAL at 17:00

## 2018-06-06 RX ADMIN — HYDROMORPHONE HYDROCHLORIDE 0.5 MG: 1 INJECTION, SOLUTION INTRAMUSCULAR; INTRAVENOUS; SUBCUTANEOUS at 03:49

## 2018-06-06 RX ADMIN — METHOCARBAMOL 500 MG: 500 TABLET ORAL at 11:53

## 2018-06-06 RX ADMIN — INSULIN LISPRO 1 UNITS: 100 INJECTION, SOLUTION INTRAVENOUS; SUBCUTANEOUS at 12:35

## 2018-06-06 RX ADMIN — PRAVASTATIN SODIUM 40 MG: 40 TABLET ORAL at 16:59

## 2018-06-06 RX ADMIN — METHOCARBAMOL 500 MG: 500 TABLET ORAL at 17:00

## 2018-06-06 RX ADMIN — OXYCODONE HYDROCHLORIDE 15 MG: 10 TABLET ORAL at 00:45

## 2018-06-06 RX ADMIN — OXYCODONE HYDROCHLORIDE 15 MG: 10 TABLET ORAL at 07:50

## 2018-06-06 RX ADMIN — FERROUS SULFATE TAB 325 MG (65 MG ELEMENTAL FE) 325 MG: 325 (65 FE) TAB at 16:59

## 2018-06-06 RX ADMIN — INSULIN LISPRO 1 UNITS: 100 INJECTION, SOLUTION INTRAVENOUS; SUBCUTANEOUS at 21:39

## 2018-06-06 RX ADMIN — METFORMIN HYDROCHLORIDE 1000 MG: 500 TABLET, FILM COATED ORAL at 16:59

## 2018-06-06 RX ADMIN — ACETAMINOPHEN 975 MG: 160 SUSPENSION ORAL at 07:51

## 2018-06-06 RX ADMIN — GABAPENTIN 300 MG: 300 CAPSULE ORAL at 17:00

## 2018-06-06 RX ADMIN — Medication 100 MG: at 08:02

## 2018-06-06 RX ADMIN — INSULIN LISPRO 1 UNITS: 100 INJECTION, SOLUTION INTRAVENOUS; SUBCUTANEOUS at 08:02

## 2018-06-06 RX ADMIN — ACETAMINOPHEN 975 MG: 160 SUSPENSION ORAL at 16:59

## 2018-06-06 NOTE — SOCIAL WORK
Cm reviewed patietn during care coordination rounds  patient is not medically stable for dc today  Cm informed that anticipated d/c is for tomorrow or Friday  Cm informed by PT/OT, that is cleared at this time to d/c home with [facility]  VIVEK to provide [facility] at time of d/c

## 2018-06-06 NOTE — POST OP PROGRESS NOTES
Progress Note - Neurosurgery   Laurie Dinero 74 y o  female MRN: [id number]  Unit/Bed#: [facility] 901-01 Encounter: [id number]    Assessment:  1  POD2 revision lumbar fusion with extension from T12-L5 with decompression T12-L4  2  POD2 replacement of spinal cord stimulator IPG  3  Failed back surgical syndrome  4  Lumbar stenosis with neurogenic claudication  5  Opioid dependence  6  Fibromyalgia  7  Type 2 diabetes  8  Asthma without exacerbation  9  Hypertension  10  Hyperlipidemia    Plan:  · Exam reveals good strength throughout BLE mild 4/5 EHL weakness  Sensation intact LT BLE  · Incision clean dry intact  Dressing replaced  · Imaging reviewed personally and by attending  Final results as below  · Upright lumbar spine x-rays 6/4/18:  Hardware extending from T12 through L5  Satisfactory placement of hardware and overall spinal alignment  · Postoperative management   · Luis-Neves drain 312ml/24H, 50/12H  Bulb 1/2 full with serosanguineous output  Continue thumbprint  Trend output  Anticipate discontinuation this afternoon worse tomorrow morning  · Pain control - acute pain service consult appreciated  · Continue Tylenol to 975 mg every 8 hours scheduled  · May benefit from increasing Robaxin or discontinuation Robaxin restarting Home skelaxin  · Continue current Oxycodone and IV Dilaudid dose at this time  Will consider decrease if patient drowsy  · Continue home dose Fentanyl 50mcg/hr  · Currently on ketamine  Consider discontinuation today  · Personally placed call to [employer] rep Mckinney regarding stimulator  · Mobilize with physical therapy  Cleared for home with family support at the time of discharge  · DVT PPX:  Heparin and SCDs on during exam  · Encourage IS  · Medical management   · Diabetes - Continue home regimen  Continue Accucheck and ISS  Diet changed to diabetic diet   · Anemia - may be partially delutional   Repeat labs this afternoon  CBC also ordered for robert Narayanan Continue iron and stool softener  · Will transfuse as needed for symptoms or hemoglobin less than seven  · Leukocytosis likely reactive - repeat CBC this afternoon  · Tachycardia - related to anemia versus pain  No improvement with with starting of IV fluids overnight  Will DC fluids and continue to monitor  Patient is currently clinically asymptomatic without chest pain, shortness of breath, palpitations, dizziness or lightheadedness  · Disposition planning: requires ongoing hospitalization for drain management, monitoring of anemia, pain control and therapy evaluation  Subjective/Objective   Chief Complaint: "I'm drowsy"/postoperative follow-up    Subjective:  Patient complaining of increasing severe incisional back pain worse with movement  Admits to improvement with oxycodone 15 which was given recently  Patient also complaining of left lower extremity sciatic pain which is generally controlled with her spinal cord stimulator  She admits to difficulty adjusting stimulator  Denies chest pain, shortness of breath, palpitations, nausea or vomiting  Denies dizziness or lightheadedness with ambulation  Tolerating oral intake  Voiding  Objective:  Sitting up in chair  NAD  I/O       06/04 0701 - 06/05 0700 06/05 0701 - 06/06 0700 06/06 0701 - 06/07 0700    P  O  700 760     I V  (mL/kg) 3750 (43 4) 2116 7 (24 5)     Blood 700      Total Intake(mL/kg) 5150 (59 6) 2876 7 (33 3)     Urine (mL/kg/hr) 1850 (0 9) 950 (0 5)     Drains 410 (0 2) 312 (0 2)     Blood 800 (0 4)      Total Output 3060 1262      Net +2090 +1614 7             Unmeasured Urine Occurrence  1 x           Invasive Devices     Peripheral Intravenous Line            Peripheral IV 06/04/18 Left Hand 2 days    Peripheral IV 06/04/18 Right Forearm 2 days          Drain            Closed/Suction Drain Right Back Bulb 1 day                Physical Exam:  Vitals: Blood pressure 138/68, pulse (!) 109, temperature 97 8 °F (36 6 °C), temperature source Oral, resp  rate 20, height 5' (1 524 m), weight 86 4 kg (190 lb 7 6 oz), SpO2 95 %  ,Body mass index is 37 2 kg/m²  General appearance:  Mildly drowsy, appears stated age, cooperative and no distress  Head: Normocephalic, without obvious abnormality, atraumatic  Eyes: EOMI  Neck: supple, symmetrical, trachea midline   Back:  Incision clean dry intact with staples  Skin edges well aligned  No signs of erythema, edema or drainage  Lungs: non labored breathing  Heart:  Asymptomatic mild tachycardia  Neurologic:   Mental status: Alert, oriented, thought content appropriate  Sensory: normal to LT  Motor: moving all extremities with 4-4+/5 EHL    Lab Results:    Results from last 7 days  Lab Units 06/06/18  0442 06/05/18  1228 06/05/18  0528   WBC Thousand/uL 11 26* 9 15 12 07*   HEMOGLOBIN g/dL 7 1* 8 1* 7 9*   HEMATOCRIT % 25 0* 27 0* 27 9*   PLATELETS Thousands/uL 309 312 376       Results from last 7 days  Lab Units 06/04/18  2256 06/04/18  1253 06/04/18  1052   SODIUM mmol/L 137  --   --    POTASSIUM mmol/L 4 4  --   --    CHLORIDE mmol/L 106  --   --    CO2 mmol/L 26  --   --    BUN mg/dL 19  --   --    CREATININE mg/dL 0 91  --   --    CALCIUM mg/dL 8 1*  --   --    GLUCOSE RANDOM mg/dL 183*  --   --    GLUCOSE, ISTAT mg/dl  --  193* 179*                 No results found for: TROPONINT  ABG:No results found for: PHART, XBN0QAD, PO2ART, LFF2SPU, W2DKVUOI, BEART, SOURCE    Imaging Studies: I have personally reviewed pertinent reports  and I have personally reviewed pertinent films in PACS    Xr Lumbar Spine 2 Or 3 Views    Result Date: 6/5/2018  Narrative: LUMBAR SPINE INDICATION:   post thoraco lumbar fusion  COMPARISON:  Intraoperative examination June 4, 2018 as well as CT myelogram April 19, 2018  VIEWS:  XR SPINE LUMBAR 2 OR 3 VIEWS INJURY Images: 3 FINDINGS: There is no evidence of acute fracture or destructive osseous lesion  There has been interval surgery    Bilateral transpedicular pedicle screws and lateral fixation bars are present from T12 through L5  Bilateral lateral fixation bars above the L1 and L5 levels  Hardware appears intact  Bilateral laminectomy defects T12-L4  Intervertebral disc space cages L4-L5  Drainage catheter present  Spinal cord stimulator lead present  Disc space narrowing diffusely throughout the thoracolumbar spine  Anterior osteophytic spur formation and lateral syndesmophyte formation  Diffuse facet hypertrophic changes  The pedicles appear intact  Visualized soft tissues appear unremarkable  Impression: Interval surgery with posterior fusion T12-L5  Workstation performed: [id number]     Xr Spine Lumbar 2 Or 3 Views Injury    Result Date: 6/5/2018  Narrative: C-ARM - intraoperative localization INDICATION: DDD  Procedure guidance  COMPARISON:  X-ray 3/16/2018, myelogram 4/18/2018 TECHNIQUE: FLUOROSCOPY TIME:   1 MIN 59 SFT 3 FLUOROSCOPIC IMAGES FINDINGS: Fluoroscopic guidance provided for surgical procedure  Intraoperative localization initially, to the T12 level  Osseous and soft tissue detail limited by technique  Impression: Fluoroscopic guidance provided for surgical procedure  Posterior fusion T12-L5 interconnecting rods and bilateral pedicle screws  Interbody cages at L4 placed are stable  Please refer to the separate procedure notes for additional details  Workstation performed: [id number]     EKG, Pathology, and Other Studies: I have personally reviewed pertinent reports        VTE Pharmacologic Prophylaxis: Heparin    VTE Mechanical Prophylaxis: sequential compression device

## 2018-06-06 NOTE — PROGRESS NOTES
Progress Note - Anesthesia Acute Pain Management    Victor Manuel Dockery 74 y o  female MRN: [id number]  Unit/Bed#: [facility] 901-01 Encounter: [id number]      Assessment:   Principal Problem:    Compression of spinal cord ([address] )  Active Problems:    Failed back surgical syndrome    Status post lumbar spinal fusion    Lumbar stenosis with neurogenic claudication    Battery end of life of spinal cord stimulator      Plan:   - Pt reports high pain scores, however, she is eating, moving [address], having company and falling asleep suggesting that, functionally, her pain is at least moderately well controlled  - She reports not liking how the ketamine makes her feel - makes her head "fuzzy"  - Pt is having some R sided sciatic pain, however, she is waiting for the rep to come turn her spinal stimulator back on  This will hopefully help that pain  The main thing bothering her right now is acute surgical pain in her back, which she describes as sharp   - Doses of her home pain medications were confirmed  - Recommend:   - Discontinue Ketamine (by APS); Can re-start if pt changes her mind  - Increase Robaxin to 750mg PO q8 hours prn pain; Consider D/C robaxin and switch to pt's home Skelaxin if continued pain and/or pt does not like how she feels  - Would not recommend increasing opioids any further    - Continue multimodal pain regimen as currently ordered otherwise  - APS will continue to follow      Meds/Allergies   all current active meds have been reviewed    Allergies   Allergen Reactions    Penicillins Anaphylaxis     Other reaction(s): Unknown Reaction    Sulfa Antibiotics Anaphylaxis     Other reaction(s): Unknown Reaction    Aspartame Other (See Comments) and Hypertension     Slurred speech, weakness, stroke sx    Iodinated Diagnostic Agents Hives     Pt has taken prep prior for contrast and has not had any break through reaction    Other Hives     Other reaction(s): Unknown Reaction  Contrast dye         Objective Temp:  [97 8 °F (36 6 °C)-100 6 °F (38 1 °C)] 97 9 °F (36 6 °C)  HR:  [] 106  Resp:  [17-20] 20  BP: (128-172)/(58-79) 128/60      Intake/Output Summary (Last 24 hours) at 06/06/18 1228  Last data filed at 06/06/18 1201   Gross per 24 hour   Intake          2893 38 ml   Output             1577 ml   Net          1316 38 ml                 Physical Exam: /60 (BP Location: Left arm)   Pulse (!) 106   Temp 97 9 °F (36 6 °C) (Oral)   Resp 20   Ht 5' (1 524 m)   Wt 86 4 kg (190 lb 7 6 oz)   LMP  (LMP Unknown)   SpO2 96%   BMI 37 20 kg/m²   Gen: NAD, OOBTC  HEENT:  PER, EOMI  CV: Reg rate  Pul: Nonlabored  Ext:  No cyanosis or edema  Neuro: AAOx3; CN II-XII grossly intact; moves all extremities    Lab Results:  Lab Results   Component Value Date    WBC 10 31 (H) 06/06/2018    HGB 7 4 (L) 06/06/2018    HCT 25 7 (L) 06/06/2018    MCV 80 (L) 06/06/2018     06/06/2018     Lab Results   Component Value Date    GLUCOSE 183 (H) 06/04/2018    CALCIUM 8 1 (L) 06/04/2018     06/04/2018    K 4 4 06/04/2018    CO2 26 06/04/2018     06/04/2018    BUN 19 06/04/2018    CREATININE 0 91 06/04/2018     Imaging Studies: I have personally reviewed pertinent reports  EKG, Pathology, and Other Studies: I have personally reviewed pertinent reports        SIGNATURE: Lord Cristin MD  DATE: June 6, 2018  TIME: 12:28 PM

## 2018-06-07 LAB
ABO GROUP BLD BPU: NORMAL
ABO GROUP BLD BPU: NORMAL
BASOPHILS # BLD AUTO: 0.07 THOUSANDS/ΜL (ref 0–0.1)
BASOPHILS NFR BLD AUTO: 1 % (ref 0–1)
BPU ID: NORMAL
BPU ID: NORMAL
EOSINOPHIL # BLD AUTO: 0.48 THOUSAND/ΜL (ref 0–0.61)
EOSINOPHIL NFR BLD AUTO: 4 % (ref 0–6)
ERYTHROCYTE [DISTWIDTH] IN BLOOD BY AUTOMATED COUNT: 21.9 % (ref 11.6–15.1)
ERYTHROCYTE [DISTWIDTH] IN BLOOD BY AUTOMATED COUNT: 23.3 % (ref 11.6–15.1)
GLUCOSE SERPL-MCNC: 136 MG/DL (ref 65–140)
GLUCOSE SERPL-MCNC: 136 MG/DL (ref 65–140)
GLUCOSE SERPL-MCNC: 143 MG/DL (ref 65–140)
GLUCOSE SERPL-MCNC: 173 MG/DL (ref 65–140)
HCT VFR BLD AUTO: 23.5 % (ref 34.8–46.1)
HCT VFR BLD AUTO: 31.3 % (ref 34.8–46.1)
HGB BLD-MCNC: 6.8 G/DL (ref 11.5–15.4)
HGB BLD-MCNC: 9.7 G/DL (ref 11.5–15.4)
IMM GRANULOCYTES # BLD AUTO: 0.06 THOUSAND/UL (ref 0–0.2)
IMM GRANULOCYTES NFR BLD AUTO: 1 % (ref 0–2)
LYMPHOCYTES # BLD AUTO: 1.82 THOUSANDS/ΜL (ref 0.6–4.47)
LYMPHOCYTES NFR BLD AUTO: 16 % (ref 14–44)
MCH RBC QN AUTO: 22.8 PG (ref 26.8–34.3)
MCH RBC QN AUTO: 25.2 PG (ref 26.8–34.3)
MCHC RBC AUTO-ENTMCNC: 28.9 G/DL (ref 31.4–37.4)
MCHC RBC AUTO-ENTMCNC: 31 G/DL (ref 31.4–37.4)
MCV RBC AUTO: 79 FL (ref 82–98)
MCV RBC AUTO: 81 FL (ref 82–98)
MONOCYTES # BLD AUTO: 1.3 THOUSAND/ΜL (ref 0.17–1.22)
MONOCYTES NFR BLD AUTO: 11 % (ref 4–12)
NEUTROPHILS # BLD AUTO: 7.65 THOUSANDS/ΜL (ref 1.85–7.62)
NEUTS SEG NFR BLD AUTO: 67 % (ref 43–75)
NRBC BLD AUTO-RTO: 0 /100 WBCS
PLATELET # BLD AUTO: 287 THOUSANDS/UL (ref 149–390)
PLATELET # BLD AUTO: 302 THOUSANDS/UL (ref 149–390)
PMV BLD AUTO: 10.1 FL (ref 8.9–12.7)
PMV BLD AUTO: 9.3 FL (ref 8.9–12.7)
RBC # BLD AUTO: 2.98 MILLION/UL (ref 3.81–5.12)
RBC # BLD AUTO: 3.85 MILLION/UL (ref 3.81–5.12)
UNIT DISPENSE STATUS: NORMAL
UNIT DISPENSE STATUS: NORMAL
UNIT PRODUCT CODE: NORMAL
UNIT PRODUCT CODE: NORMAL
UNIT RH: NORMAL
UNIT RH: NORMAL
WBC # BLD AUTO: 10.24 THOUSAND/UL (ref 4.31–10.16)
WBC # BLD AUTO: 11.38 THOUSAND/UL (ref 4.31–10.16)

## 2018-06-07 PROCEDURE — P9016 RBC LEUKOCYTES REDUCED: HCPCS

## 2018-06-07 PROCEDURE — 85025 COMPLETE CBC W/AUTO DIFF WBC: CPT | Performed by: PHYSICIAN ASSISTANT

## 2018-06-07 PROCEDURE — 85027 COMPLETE CBC AUTOMATED: CPT | Performed by: PHYSICIAN ASSISTANT

## 2018-06-07 PROCEDURE — 97116 GAIT TRAINING THERAPY: CPT

## 2018-06-07 PROCEDURE — 97530 THERAPEUTIC ACTIVITIES: CPT

## 2018-06-07 PROCEDURE — P9021 RED BLOOD CELLS UNIT: HCPCS

## 2018-06-07 PROCEDURE — 82948 REAGENT STRIP/BLOOD GLUCOSE: CPT

## 2018-06-07 RX ORDER — METHOCARBAMOL 750 MG/1
750 TABLET, FILM COATED ORAL EVERY 6 HOURS SCHEDULED
Status: DISCONTINUED | OUTPATIENT
Start: 2018-06-07 | End: 2018-06-08 | Stop reason: HOSPADM

## 2018-06-07 RX ADMIN — MECLIZINE HYDROCHLORIDE 25 MG: 25 TABLET ORAL at 15:37

## 2018-06-07 RX ADMIN — PANTOPRAZOLE SODIUM 40 MG: 40 TABLET, DELAYED RELEASE ORAL at 06:26

## 2018-06-07 RX ADMIN — Medication 100 MG: at 08:29

## 2018-06-07 RX ADMIN — FERROUS SULFATE TAB 325 MG (65 MG ELEMENTAL FE) 325 MG: 325 (65 FE) TAB at 17:04

## 2018-06-07 RX ADMIN — ACETAMINOPHEN 975 MG: 160 SUSPENSION ORAL at 17:05

## 2018-06-07 RX ADMIN — ACETAMINOPHEN 975 MG: 160 SUSPENSION ORAL at 08:28

## 2018-06-07 RX ADMIN — LEVOTHYROXINE SODIUM 25 MCG: 25 TABLET ORAL at 06:26

## 2018-06-07 RX ADMIN — GABAPENTIN 300 MG: 300 CAPSULE ORAL at 17:05

## 2018-06-07 RX ADMIN — OXYCODONE HYDROCHLORIDE 15 MG: 10 TABLET ORAL at 12:47

## 2018-06-07 RX ADMIN — METHOCARBAMOL 750 MG: 750 TABLET ORAL at 17:05

## 2018-06-07 RX ADMIN — OXYBUTYNIN CHLORIDE 15 MG: 5 TABLET, EXTENDED RELEASE ORAL at 21:48

## 2018-06-07 RX ADMIN — SENNOSIDES 8.6 MG: 8.6 TABLET, FILM COATED ORAL at 08:28

## 2018-06-07 RX ADMIN — VALSARTAN 40 MG: 80 TABLET ORAL at 08:27

## 2018-06-07 RX ADMIN — OXYCODONE HYDROCHLORIDE 10 MG: 10 TABLET ORAL at 06:42

## 2018-06-07 RX ADMIN — DOCUSATE SODIUM 100 MG: 100 CAPSULE, LIQUID FILLED ORAL at 08:28

## 2018-06-07 RX ADMIN — METHOCARBAMOL 500 MG: 500 TABLET ORAL at 00:26

## 2018-06-07 RX ADMIN — HYDROMORPHONE HYDROCHLORIDE 0.5 MG: 1 INJECTION, SOLUTION INTRAMUSCULAR; INTRAVENOUS; SUBCUTANEOUS at 01:50

## 2018-06-07 RX ADMIN — GABAPENTIN 300 MG: 300 CAPSULE ORAL at 08:28

## 2018-06-07 RX ADMIN — MECLIZINE HYDROCHLORIDE 25 MG: 25 TABLET ORAL at 06:26

## 2018-06-07 RX ADMIN — OXYCODONE HYDROCHLORIDE 15 MG: 10 TABLET ORAL at 17:04

## 2018-06-07 RX ADMIN — HEPARIN SODIUM 5000 UNITS: 5000 INJECTION, SOLUTION INTRAVENOUS; SUBCUTANEOUS at 15:38

## 2018-06-07 RX ADMIN — METHOCARBAMOL 500 MG: 500 TABLET ORAL at 06:26

## 2018-06-07 RX ADMIN — HEPARIN SODIUM 5000 UNITS: 5000 INJECTION, SOLUTION INTRAVENOUS; SUBCUTANEOUS at 06:26

## 2018-06-07 RX ADMIN — INSULIN LISPRO 1 UNITS: 100 INJECTION, SOLUTION INTRAVENOUS; SUBCUTANEOUS at 12:47

## 2018-06-07 RX ADMIN — OXYCODONE HYDROCHLORIDE 15 MG: 10 TABLET ORAL at 00:26

## 2018-06-07 RX ADMIN — METFORMIN HYDROCHLORIDE 1000 MG: 500 TABLET, FILM COATED ORAL at 08:27

## 2018-06-07 RX ADMIN — DOCUSATE SODIUM 100 MG: 100 CAPSULE, LIQUID FILLED ORAL at 17:05

## 2018-06-07 RX ADMIN — LATANOPROST 1 DROP: 50 SOLUTION OPHTHALMIC at 21:51

## 2018-06-07 RX ADMIN — HEPARIN SODIUM 5000 UNITS: 5000 INJECTION, SOLUTION INTRAVENOUS; SUBCUTANEOUS at 21:47

## 2018-06-07 RX ADMIN — HYDROMORPHONE HYDROCHLORIDE 0.5 MG: 1 INJECTION, SOLUTION INTRAMUSCULAR; INTRAVENOUS; SUBCUTANEOUS at 08:28

## 2018-06-07 RX ADMIN — HYDROMORPHONE HYDROCHLORIDE 0.5 MG: 1 INJECTION, SOLUTION INTRAMUSCULAR; INTRAVENOUS; SUBCUTANEOUS at 03:41

## 2018-06-07 RX ADMIN — MECLIZINE HYDROCHLORIDE 25 MG: 25 TABLET ORAL at 21:48

## 2018-06-07 RX ADMIN — PRAVASTATIN SODIUM 40 MG: 40 TABLET ORAL at 17:05

## 2018-06-07 RX ADMIN — PANTOPRAZOLE SODIUM 40 MG: 40 TABLET, DELAYED RELEASE ORAL at 17:04

## 2018-06-07 RX ADMIN — METHOCARBAMOL 750 MG: 750 TABLET ORAL at 12:46

## 2018-06-07 RX ADMIN — FERROUS SULFATE TAB 325 MG (65 MG ELEMENTAL FE) 325 MG: 325 (65 FE) TAB at 08:28

## 2018-06-07 RX ADMIN — METFORMIN HYDROCHLORIDE 1000 MG: 500 TABLET, FILM COATED ORAL at 17:04

## 2018-06-07 NOTE — PLAN OF CARE
Problem: PHYSICAL THERAPY ADULT  Goal: Performs mobility at highest level of function for planned discharge setting  See evaluation for individualized goals  Treatment/Interventions: Functional transfer training, Endurance training, Patient/family training, Bed mobility, Gait training  Equipment Recommended:  (pt has RW at home )       See flowsheet documentation for full assessment, interventions and recommendations  Outcome: Adequate for Discharge  Prognosis: Good  Problem List: Decreased strength, Decreased endurance, Impaired balance, Decreased mobility, Pain  Assessment: The pt  was able to ambulate household distances, but she remains limited due to fatigue  She had no loss of balance, and she was able to perform limited dynamic head movement during gait without difficulty  She was also able to complete log-rolling to egress from the bed with increased time and instruction  She has demonstrated the necessary mobility in order to safely return home with family support, but she will benefit from continued physical therapy  Barriers to Discharge: None     Recommendation: Home PT, Home with family support     PT - OK to Discharge: Yes    See flowsheet documentation for full assessment

## 2018-06-07 NOTE — POST OP PROGRESS NOTES
Progress Note - Neurosurgery   Victor Manuel Dokcery 74 y o  female MRN: [id number]  Unit/Bed#: [facility] 901-01 Encounter: [id number]    Assessment:  1  POD3 revision lumbar fusion with extension from T12-L5 with decompression T12-L4  2  POD3 replacement of spinal cord stimulator IPG  3  Failed back surgical syndrome  4  Lumbar stenosis with neurogenic claudication  5  Opioid dependence  6  Fibromyalgia  7  Type 2 diabetes  8  Asthma without exacerbation  9  Hypertension  10  Hyperlipidemia    Plan:  · Exam reveals good strength throughout BLE mild 4+/5 EHL weakness  Sensation intact LT BLE  · Imaging reviewed personally and by attending  Final results as below  · Upright lumbar spine x-rays 6/4/18:  Hardware extending from T12 through L5  Satisfactory placement of hardware and overall spinal alignment  · Postoperative management   · Luis-Neves drain 162ml/24H, 12/8H  Drain on full suction at the time of my exam   Change to ordered thumbprint suction  Plan for discontinuation today  · Pain control - acute pain service consult appreciated - signed off   · Continue Tylenol to 975 mg every 8 hours scheduled  · Robaxin increased to 750 mg every 6 H  · Continue current Oxycodone 15mg dose q4H  · Continue home dose Fentanyl 50mcg/hr  · Discontinue IV medications  · [employer] rep Mckinney to present to the hospital this afternoon for evaluation of stimulator  · Mobilize with physical therapy  Cleared for home with family support at the time of discharge  · DVT PPX:  Heparin and SCDs on during exam  · Encourage IS  · Medical management   · Diabetes - Continue home regimen  Continue Accucheck and ISS  Continue diabetic diet   · Anemia - hgb 6 8  Fatigue and tachycardia  Ordered 1 unit PRBC  Will recheck CBC following transfusion  May require additional unit of blood  CBC also ordered for a m  Continue iron and stool softener  · Leukocytosis - stable  Monitor  · Tachycardia - related to anemia versus pain  Patient is currently clinically asymptomatic without chest pain, shortness of breath, palpitations, dizziness or lightheadedness  · Disposition planning: requires ongoing hospitalization for monitoring of anemia  Anticipate discharge home tomorrow  · Called in updated patient's daughter Jeimy Arechiga  Questions answered  She agrees with plan for 2nd unit of blood    Subjective/Objective   Chief Complaint: "I'm okay, I guess"/postoperative follow-up    Subjective:  Patient states continue exacerbation of back pain which is worse with movement but does improve with regimen  Overall pain scores are improved  Patient continues to describe left lower extremity sciatic pain and poor coverage with spinal cord stimulator  Denies any chest pain, shortness of breath, lightheadedness, dizziness or palpitations  Does note some mild fatigue  Denies any weakness or sensation changes of her lower extremities  Admits to poor appetite  Objective:  Sitting up in chair  NAD  I/O       06/05 0701 - 06/06 0700 06/06 0701 - 06/07 0700 06/07 0701 - 06/08 0700    P  O  760 870 240    I V  (mL/kg) 2116 7 (24 5) 693 4 (7 8)     Blood       Total Intake(mL/kg) 2876 7 (33 3) 1563 4 (17 7) 240 (2 7)    Urine (mL/kg/hr) 950 (0 5) 400 (0 2)     Drains 312 (0 2) 162 (0 1)     Blood       Total Output 1262 562      Net +1614 7 +1001 4 +240           Unmeasured Urine Occurrence 1 x 10 x 1 x          Invasive Devices     Peripheral Intravenous Line            Peripheral IV 06/04/18 Left Hand 3 days    Peripheral IV 06/04/18 Right Forearm 3 days          Drain            Closed/Suction Drain Right Back Bulb 2 days                Physical Exam:  Vitals: Blood pressure 116/55, pulse (!) 107, temperature 98 6 °F (37 °C), temperature source Oral, resp  rate 17, height 5' (1 524 m), weight 88 5 kg (195 lb 1 7 oz), SpO2 92 %  ,Body mass index is 38 1 kg/m²      General appearance: alert, appears stated age, cooperative and no distress  Head: Normocephalic, without obvious abnormality, atraumatic  Eyes: EOMI  Neck: supple, symmetrical, trachea midline   Back:  Dressing intact but drainage noted  Lungs: non labored breathing  Heart:  Asymptomatic tachycardia  Neurologic:   Mental status: Alert, oriented, thought content appropriate  Sensory: normal to LT  Motor: moving all extremities with 4+/5 EHL  JPs intact b/l hallus    Lab Results:    Results from last 7 days  Lab Units 06/07/18  0429 06/06/18  1200 06/06/18  0442   WBC Thousand/uL 10 24* 10 31* 11 26*   HEMOGLOBIN g/dL 6 8* 7 4* 7 1*   HEMATOCRIT % 23 5* 25 7* 25 0*   PLATELETS Thousands/uL 287 302 309       Results from last 7 days  Lab Units 06/04/18  2256 06/04/18  1253 06/04/18  1052   SODIUM mmol/L 137  --   --    POTASSIUM mmol/L 4 4  --   --    CHLORIDE mmol/L 106  --   --    CO2 mmol/L 26  --   --    BUN mg/dL 19  --   --    CREATININE mg/dL 0 91  --   --    CALCIUM mg/dL 8 1*  --   --    GLUCOSE RANDOM mg/dL 183*  --   --    GLUCOSE, ISTAT mg/dl  --  193* 179*                 No results found for: TROPONINT  ABG:No results found for: PHART, UNU2ICD, PO2ART, AFN4UZT, F2INPMJA, BEART, SOURCE    Imaging Studies: I have personally reviewed pertinent reports  and I have personally reviewed pertinent films in PACS    XR lumbar spine 2 or 3 views   Final Result by Benita Lopez DO (06/05 2281)   Interval surgery with posterior fusion T12-L5  Workstation performed: [id number]         XR spine lumbar 2 or 3 views injury   Final Result by Mary Kay Duran MD (89/94 9499)      Fluoroscopic guidance provided for surgical procedure  Posterior fusion T12-L5 interconnecting rods and bilateral pedicle screws  Interbody cages at L4 placed are stable  Please refer to the separate procedure notes for additional details  Workstation performed: [id number]             EKG, Pathology, and Other Studies: I have personally reviewed pertinent reports        VTE Pharmacologic Prophylaxis: Heparin    VTE Mechanical Prophylaxis: sequential compression device

## 2018-06-07 NOTE — PHYSICAL THERAPY NOTE
Physical Therapy Progress Note     06/07/18 8300   Pain Assessment   Pain Assessment 0-10   Pain Score 4   Pain Type Acute pain;Surgical pain   Pain Location Back   Pain Orientation Lower   Hospital Pain Intervention(s) Repositioned; Ambulation/increased activity; Emotional support   Response to Interventions Tolerated  Restrictions/Precautions   Other Precautions Spinal precautions; Fall Risk;Pain   Subjective   Subjective The pt  states that she has not slept in two days, and she is finally getting tired now  She requested therapy work on bed mobility as the pt  has the most difficulty with that  Bed Mobility   Rolling R 5  Supervision   Rolling L 5  Supervision   Supine to Sit 5  Supervision   Additional items Increased time required;Verbal cues  (4 minutes to complete to the left, 3 min to the right )   Sit to Supine 5  Supervision   Additional items Increased time required   Transfers   Sit to Stand 5  Supervision   Additional items Increased time required   Stand to Sit 5  Supervision   Additional items Increased time required   Ambulation/Elevation   Gait pattern Excessively slow; Forward Flexion;Decreased foot clearance; Inconsistent jorge   Gait Assistance 5  Supervision   Additional items Verbal cues   Assistive Device Rolling walker   Distance 50 feet  Balance   Static Sitting Good   Static Standing Fair   Ambulatory Fair   Activity Tolerance   Nurse Made Aware Silvano Saavedra RN  Assessment   Prognosis Good   Problem List Decreased strength;Decreased endurance; Impaired balance;Decreased mobility;Pain   Assessment The pt  was able to ambulate household distances, but she remains limited due to fatigue  She had no loss of balance, and she was able to perform limited dynamic head movement during gait without difficulty  She was also able to complete log-rolling to egress from the bed with increased time and instruction   She has demonstrated the necessary mobility in order to safely return home with family support, but she will benefit from continued physical therapy  Barriers to Discharge None   Goals   Patient Goals To get some rest, and to return home  LTG Expiration Date 06/11/18   Treatment Day 1   Plan   Treatment/Interventions Functional transfer training; Endurance training;Patient/family training;Bed mobility;Gait training   Progress Progressing toward goals   PT Frequency 5x/wk   Recommendation   Recommendation Home PT; Home with family support   PT - OK to Discharge Yes     Mert Lawton, PTA

## 2018-06-07 NOTE — SOCIAL WORK
Cm reviewed patient during care coordination rounds  Patient is not medically stable for dc today  Cm informed that primary medical team to transfuse patient  Anticipated for d/c tomorrow  Cm to inform [address] about discharge update

## 2018-06-07 NOTE — PROGRESS NOTES
Progress Note - Anesthesia Acute Pain Management    Conner Love 74 y o  female MRN: [id number]  Unit/Bed#: [facility] 901-01 Encounter: [id number]      SURGERY DATE: 6/4/2018  Post-Op Diagnosis Codes:     * Status post lumbar spinal fusion [Z98 1]     * 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]     * Lumbar stenosis with neurogenic claudication [M48 062]     * Battery end of life of spinal cord stimulator [Z45 42]    Assessment:   74 y o  female status post Procedure(s):  Reopening of lumbar incision for T12-L5 posterior instrumented fixation and fusion and T12-L4 posterior decompression  removal of left buttock implantable pulse generator and placement of new  implantable pulse generator POD# 3  Doing better today with acute surgical pain    Principal Problem:    Compression of spinal cord (HCC)  Active Problems:    Failed back surgical syndrome    Status post lumbar spinal fusion    Lumbar stenosis with neurogenic claudication    Battery end of life of spinal cord stimulator    Plan:   1  Discontinue IV breakthrough according to protocol  2  Continue regimen    APS sign off  Thank you for the Consult  Please call  / [phone] ( [facility] [phone]) with any further questions    Pain Course:    24 hour history:  Patient states that her pain is doing much better  She states that the surgical pain can become occasionally severe on movement, but overall doing well    No other complaints this morning    Meds/Allergies     Inpatient Medications:  Scheduled Meds:   Current Facility-Administered Medications:  acetaminophen 975 mg Oral Q8H Crys Hines PA-C   dicyclomine 10 mg Oral TID PRN Bisi Scott MD   diphenhydrAMINE 25 mg Oral Q6H PRN Crys Hines PA-C   docusate sodium 100 mg Oral BID Bsii Scott MD   fentaNYL 50 mcg Transdermal Q72H Crys Hines PA-C   ferrous sulfate 325 mg Oral BID With Meals Bisi Scott MD   gabapentin 300 mg Oral BID Bisi Scott MD   glycopyrrolate 0 2 mg Intravenous Q4H PRN Marleny Ramirez MD   GuaiFENesin 400 mg Oral Daily PRN Anabell Chan MD   haloperidol lactate 2 mg Intramuscular Q30 Min PRN Marleny Ramirez MD   heparin (porcine) 5,000 Units Subcutaneous [facility] Anabell Chan MD   HYDROmorphone 0 5 mg Intravenous Q1H PRN Anabell Chan MD   insulin lispro 1-5 Units Subcutaneous TID AC Crys Hines PA-C   insulin lispro 1-5 Units Subcutaneous HS Crys Hines PA-C   latanoprost 1 drop Both Eyes HS Anabell Chan MD   levothyroxine 25 mcg Oral Early Morning Anabell Chan MD   loratadine 10 mg Oral Daily PRN Anabell Chan MD   LORazepam 1 mg Intravenous Q1H PRN Marleny Ramirez MD   meclizine 25 mg Oral [facility] Anabell Chan MD   metFORMIN 1,000 mg Oral BID With Meals Anabell Chan MD   methocarbamol 500 mg Oral Q6H [address] Anabell Chan MD   Milnacipran HCl 1 tablet Oral Daily Anabell Chan MD   ondansetron 4 mg Intravenous Q6H PRN Anabell Chan MD   oxybutynin 15 mg Oral HS Anabell Chan MD   oxyCODONE 10 mg Oral Q4H PRN Anabell Chan MD   oxyCODONE 15 mg Oral Q4H PRN Cecy So PA-C   pantoprazole 40 mg Oral BID AC Anabell Chan MD   pravastatin 40 mg Oral Daily With Lea Yu MD   pyridoxine 100 mg Oral Daily Anabell Chan MD   senna 1 tablet Oral Daily Anabell Chan MD   valsartan 40 mg Oral Daily Anabell Chan MD     Continuous Infusions:    PRN Meds:    dicyclomine 10 mg TID PRN   diphenhydrAMINE 25 mg Q6H PRN   glycopyrrolate 0 2 mg Q4H PRN   GuaiFENesin 400 mg Daily PRN   haloperidol lactate 2 mg Q30 Min PRN   HYDROmorphone 0 5 mg Q1H PRN   loratadine 10 mg Daily PRN   LORazepam 1 mg Q1H PRN   ondansetron 4 mg Q6H PRN   oxyCODONE 10 mg Q4H PRN   oxyCODONE 15 mg Q4H PRN     Allergies   Allergen Reactions    Penicillins Anaphylaxis     Other reaction(s): Unknown Reaction    Sulfa Antibiotics Anaphylaxis     Other reaction(s): Unknown Reaction    Aspartame Other (See Comments) and Hypertension     Slurred speech, weakness, stroke sx    Iodinated Diagnostic Agents Hives     Pt has taken prep prior for contrast and has not had any break through reaction    Other Hives     Other reaction(s): Unknown Reaction  Contrast dye       Objective     Physical Exam: /59 (BP Location: Right arm)   Pulse (!) 107   Temp 100 1 °F (37 8 °C) (Oral)   Resp 17   Ht 5' (1 524 m)   Wt 88 5 kg (195 lb 1 7 oz)   LMP  (LMP Unknown)   SpO2 90%   BMI 38 10 kg/m²   Gen: Well appearing and well nourished, NAD sitting up in chair occasionally dosing off during visit but arousable  Skin: Warm, Dry   HEENT:  PERRLA, EOMI  CV: RRR  Pul:non labored resp effort  Ext:  No cyanosis or edema  Neuro: AAOx3; CN II-XII grossly intact; 5/5 BLUE, 5/5 BLLE; Sensation intact grossly   Psych:  conversant

## 2018-06-07 NOTE — PROGRESS NOTES
Call from lab to [facility]; pt's  hgb 6 8  Attempted to call neurosurgery; house pa on call  Attempt call back after 7  Oncoming nurse aware

## 2018-06-08 VITALS
RESPIRATION RATE: 18 BRPM | BODY MASS INDEX: 37.74 KG/M2 | DIASTOLIC BLOOD PRESSURE: 79 MMHG | OXYGEN SATURATION: 95 % | SYSTOLIC BLOOD PRESSURE: 170 MMHG | HEART RATE: 88 BPM | TEMPERATURE: 100.4 F | HEIGHT: 60 IN | WEIGHT: 192.24 LBS

## 2018-06-08 PROBLEM — M48.062 LUMBAR STENOSIS WITH NEUROGENIC CLAUDICATION: Chronic | Status: RESOLVED | Noted: 2018-04-24 | Resolved: 2018-06-08

## 2018-06-08 PROBLEM — Z45.42 BATTERY END OF LIFE OF SPINAL CORD STIMULATOR: Chronic | Status: RESOLVED | Noted: 2018-04-24 | Resolved: 2018-06-08

## 2018-06-08 PROBLEM — G95.20 COMPRESSION OF SPINAL CORD (HCC): Chronic | Status: RESOLVED | Noted: 2018-04-24 | Resolved: 2018-06-08

## 2018-06-08 LAB
ABO GROUP BLD BPU: NORMAL
ABO GROUP BLD BPU: NORMAL
BPU ID: NORMAL
BPU ID: NORMAL
ERYTHROCYTE [DISTWIDTH] IN BLOOD BY AUTOMATED COUNT: 21.9 % (ref 11.6–15.1)
GLUCOSE SERPL-MCNC: 153 MG/DL (ref 65–140)
HCT VFR BLD AUTO: 31.2 % (ref 34.8–46.1)
HGB BLD-MCNC: 9.5 G/DL (ref 11.5–15.4)
MCH RBC QN AUTO: 24.9 PG (ref 26.8–34.3)
MCHC RBC AUTO-ENTMCNC: 30.4 G/DL (ref 31.4–37.4)
MCV RBC AUTO: 82 FL (ref 82–98)
PLATELET # BLD AUTO: 301 THOUSANDS/UL (ref 149–390)
PMV BLD AUTO: 10.2 FL (ref 8.9–12.7)
RBC # BLD AUTO: 3.82 MILLION/UL (ref 3.81–5.12)
UNIT DISPENSE STATUS: NORMAL
UNIT DISPENSE STATUS: NORMAL
UNIT PRODUCT CODE: NORMAL
UNIT PRODUCT CODE: NORMAL
UNIT RH: NORMAL
UNIT RH: NORMAL
WBC # BLD AUTO: 9.78 THOUSAND/UL (ref 4.31–10.16)

## 2018-06-08 PROCEDURE — 82948 REAGENT STRIP/BLOOD GLUCOSE: CPT

## 2018-06-08 PROCEDURE — 85027 COMPLETE CBC AUTOMATED: CPT | Performed by: PHYSICIAN ASSISTANT

## 2018-06-08 RX ORDER — POLYETHYLENE GLYCOL 3350 17 G/17G
17 POWDER, FOR SOLUTION ORAL DAILY
Qty: 14 EACH | Refills: 0 | Status: SHIPPED | OUTPATIENT
Start: 2018-06-08 | End: 2019-05-10 | Stop reason: ALTCHOICE

## 2018-06-08 RX ORDER — ACETAMINOPHEN 325 MG/1
975 TABLET ORAL EVERY 8 HOURS PRN
Qty: 30 TABLET | Refills: 0 | Status: SHIPPED | OUTPATIENT
Start: 2018-06-08 | End: 2019-12-05 | Stop reason: ALTCHOICE

## 2018-06-08 RX ORDER — DOCUSATE SODIUM 100 MG/1
100 CAPSULE, LIQUID FILLED ORAL 2 TIMES DAILY
Qty: 30 CAPSULE | Refills: 0 | Status: SHIPPED | OUTPATIENT
Start: 2018-06-08 | End: 2019-05-10 | Stop reason: ALTCHOICE

## 2018-06-08 RX ORDER — OXYCODONE HYDROCHLORIDE 15 MG/1
15 TABLET ORAL EVERY 4 HOURS PRN
Qty: 60 TABLET | Refills: 0 | Status: SHIPPED | OUTPATIENT
Start: 2018-06-08 | End: 2018-06-18 | Stop reason: SDUPTHER

## 2018-06-08 RX ORDER — GABAPENTIN 300 MG/1
300 CAPSULE ORAL 2 TIMES DAILY
Qty: 30 CAPSULE | Refills: 0 | Status: SHIPPED | OUTPATIENT
Start: 2018-06-08 | End: 2018-06-18 | Stop reason: SDUPTHER

## 2018-06-08 RX ORDER — METHOCARBAMOL 750 MG/1
750 TABLET, FILM COATED ORAL EVERY 6 HOURS SCHEDULED
Qty: 60 TABLET | Refills: 0 | Status: SHIPPED | OUTPATIENT
Start: 2018-06-08 | End: 2018-06-18 | Stop reason: SDUPTHER

## 2018-06-08 RX ADMIN — OXYCODONE HYDROCHLORIDE 10 MG: 10 TABLET ORAL at 02:07

## 2018-06-08 RX ADMIN — LEVOTHYROXINE SODIUM 25 MCG: 25 TABLET ORAL at 06:20

## 2018-06-08 RX ADMIN — SENNOSIDES 8.6 MG: 8.6 TABLET, FILM COATED ORAL at 08:16

## 2018-06-08 RX ADMIN — VALSARTAN 40 MG: 80 TABLET ORAL at 08:15

## 2018-06-08 RX ADMIN — INSULIN LISPRO 1 UNITS: 100 INJECTION, SOLUTION INTRAVENOUS; SUBCUTANEOUS at 08:17

## 2018-06-08 RX ADMIN — OXYCODONE HYDROCHLORIDE 15 MG: 10 TABLET ORAL at 06:20

## 2018-06-08 RX ADMIN — Medication 100 MG: at 08:15

## 2018-06-08 RX ADMIN — HEPARIN SODIUM 5000 UNITS: 5000 INJECTION, SOLUTION INTRAVENOUS; SUBCUTANEOUS at 06:20

## 2018-06-08 RX ADMIN — FERROUS SULFATE TAB 325 MG (65 MG ELEMENTAL FE) 325 MG: 325 (65 FE) TAB at 08:14

## 2018-06-08 RX ADMIN — OXYCODONE HYDROCHLORIDE 15 MG: 10 TABLET ORAL at 10:55

## 2018-06-08 RX ADMIN — MECLIZINE HYDROCHLORIDE 25 MG: 25 TABLET ORAL at 06:20

## 2018-06-08 RX ADMIN — METHOCARBAMOL 750 MG: 750 TABLET ORAL at 06:20

## 2018-06-08 RX ADMIN — ACETAMINOPHEN 975 MG: 160 SUSPENSION ORAL at 08:14

## 2018-06-08 RX ADMIN — PANTOPRAZOLE SODIUM 40 MG: 40 TABLET, DELAYED RELEASE ORAL at 06:21

## 2018-06-08 RX ADMIN — METFORMIN HYDROCHLORIDE 1000 MG: 500 TABLET, FILM COATED ORAL at 08:14

## 2018-06-08 RX ADMIN — GABAPENTIN 300 MG: 300 CAPSULE ORAL at 08:16

## 2018-06-08 RX ADMIN — DOCUSATE SODIUM 100 MG: 100 CAPSULE, LIQUID FILLED ORAL at 08:15

## 2018-06-08 NOTE — SOCIAL WORK
Cm reviewed patient during care coordination rounds  Patient is medically stable for d/c today  Cm confirmed with PT/OT that patient is cleared for d/c home  Cm contacted [facility] to inform about patient's discharge  Cm met with patient and she stated that her daughter would transport her home  Patient stated that she wanted her prescription filled at her home pharmacy  Cm to inform floor RN  Awaiting daughter's arrival     CM reviewed d/c planning process including the following: identifying help at home, patient preference for d/c planning needs, Discharge Lounge, [facility] Meds to Bed program, availability of treatment team to discuss questions or concerns patient and/or family may have regarding understanding medications and recognizing signs and symptoms once discharged  CM also encouraged patient to follow up with all recommended appointments after discharge  Patient advised of importance for patient and family to participate in managing patients medical well being

## 2018-06-08 NOTE — POST OP PROGRESS NOTES
Progress Note - Neurosurgery   Jacquie Ross 74 y o  female MRN: [id number]  Unit/Bed#: [facility] 901-01 Encounter: [id number]    Assessment:  1  POD4 revision lumbar fusion with extension from T12-L5 with decompression T12-L4  2  POD4 replacement of spinal cord stimulator IPG  3  Failed back surgical syndrome  4  Lumbar stenosis with neurogenic claudication  5  Opioid dependence  6  Fibromyalgia  7  Type 2 diabetes  8  Asthma without exacerbation  9  Hypertension  10  Hyperlipidemia    Plan:  · Exam reveals good strength throughout BLE  Sensation intact LT BLE  · Imaging reviewed personally and by attending  Final results as below  · Upright lumbar spine x-rays 6/4/18:  Hardware extending from T12 through L5  Satisfactory placement of hardware and overall spinal alignment  · Postoperative management   · Luis-Neves drain discontinued 1/9/30 without complications  · Pain control - acute pain service consult appreciated - signed off   · Tylenol to 975 mg every 8 hours   · Gabapentin 300mg PO BID   · Robaxin 750 mg every 6 H prn   · Continue current Oxycodone 15mg dose q4H  · Continue home dose Fentanyl 50mcg/hr  · Patient with difficulty using stimulator  She states daughter was educated on use  Improved coverage since [employer] rep [place] came yesterday  · Mobilize with physical therapy  Cleared for home with family support at the time of discharge  · DVT PPX:  Heparin and SCDs on during exam  · Encourage IS  · Medical management   · Diabetes - Continue home regimen  Continue Accucheck and ISS  Continue diabetic diet   · Anemia - hgb 9 5   · Tachycardia stable - regular rhythm  Continue iron and stool softener  · Leukocytosis - resolved  · Tachycardia - likely related to pain  Patient is currently clinically asymptomatic without chest pain, shortness of breath, palpitations, dizziness or lightheadedness    · Disposition planning: discharge home today    Subjective/Objective   Chief Complaint: "I had 10/10 pain at 5am"/Postoperative follow-up    Subjective:   Patient states significant back pain rated 10 out 10 around 5:00 a m  Today  She admits to mild associated anterior thigh pain  States posterior left leg pain improved since stimulator adjusted  Denies any weakness or sensation changes of the lower extremities  Voiding well  Denies bowel movement  + flatus  Denies abdominal pain, distention, nausea or vomiting  No chest pain, shortness of breath, palpitations, lightheadedness or dizziness  Objective:  Sitting up in chair  NAD  I/O       06/06 0701 - 06/07 0700 06/07 0701 - 06/08 0700 06/08 0701 - 06/09 0700    P  O  870 1200 120    I V  (mL/kg) 693 4 (7 8)      Blood  1016     Total Intake(mL/kg) 1563 4 (17 7) 2216 (25 4) 120 (1 4)    Urine (mL/kg/hr) 400 (0 2)      Drains 162 (0 1)      Total Output 562        Net +1001 4 +2216 +120           Unmeasured Urine Occurrence 10 x 8 x           Invasive Devices     Peripheral Intravenous Line            Peripheral IV 06/07/18 Left Forearm less than 1 day                Physical Exam:  Vitals: Blood pressure 170/79, pulse 88, temperature 100 4 °F (38 °C), temperature source Oral, resp  rate 18, height 5' (1 524 m), weight 87 2 kg (192 lb 3 9 oz), SpO2 95 %  ,Body mass index is 37 54 kg/m²      General appearance: alert, appears stated age, cooperative and no distress  Head: Normocephalic, without obvious abnormality, atraumatic  Eyes: EOMI  Back: dressing intact  Lungs: non labored breathing  Heart: regular heart rate  Neurologic:   Mental status: Alert, oriented, thought content appropriate  Cranial nerves: grossly intact (Cranial nerves II-XII)  Sensory: normal to LT  Motor: moving all extremities without focal weakness    Lab Results:    Results from last 7 days  Lab Units 06/08/18  0449 06/07/18 2119 06/07/18  0429   WBC Thousand/uL 9 78 11 38* 10 24*   HEMOGLOBIN g/dL 9 5* 9 7* 6 8*   HEMATOCRIT % 31 2* 31 3* 23 5*   PLATELETS Thousands/uL 301 302 287   NEUTROS PCT %  --  67  --    MONOS PCT %  --  11  --        Results from last 7 days  Lab Units 06/04/18  2256 06/04/18  1253 06/04/18  1052   SODIUM mmol/L 137  --   --    POTASSIUM mmol/L 4 4  --   --    CHLORIDE mmol/L 106  --   --    CO2 mmol/L 26  --   --    BUN mg/dL 19  --   --    CREATININE mg/dL 0 91  --   --    CALCIUM mg/dL 8 1*  --   --    GLUCOSE RANDOM mg/dL 183*  --   --    GLUCOSE, ISTAT mg/dl  --  193* 179*                 No results found for: TROPONINT  ABG:No results found for: PHART, DMN4ONI, PO2ART, BKA6NEL, Y6PLZZSU, BEART, SOURCE    Imaging Studies: I have personally reviewed pertinent reports  and I have personally reviewed pertinent films in PACS    Xr Lumbar Spine 2 Or 3 Views    Result Date: 6/5/2018  Narrative: LUMBAR SPINE INDICATION:   post thoraco lumbar fusion  COMPARISON:  Intraoperative examination June 4, 2018 as well as CT myelogram April 19, 2018  VIEWS:  XR SPINE LUMBAR 2 OR 3 VIEWS INJURY Images: 3 FINDINGS: There is no evidence of acute fracture or destructive osseous lesion  There has been interval surgery  Bilateral transpedicular pedicle screws and lateral fixation bars are present from T12 through L5  Bilateral lateral fixation bars above the L1 and L5 levels  Hardware appears intact  Bilateral laminectomy defects T12-L4  Intervertebral disc space cages L4-L5  Drainage catheter present  Spinal cord stimulator lead present  Disc space narrowing diffusely throughout the thoracolumbar spine  Anterior osteophytic spur formation and lateral syndesmophyte formation  Diffuse facet hypertrophic changes  The pedicles appear intact  Visualized soft tissues appear unremarkable  Impression: Interval surgery with posterior fusion T12-L5  Workstation performed: [id number]     Xr Spine Lumbar 2 Or 3 Views Injury    Result Date: 6/5/2018  Narrative: C-ARM - intraoperative localization INDICATION: DDD  Procedure guidance   COMPARISON:  X-ray 3/16/2018, myelogram 4/18/2018 TECHNIQUE: FLUOROSCOPY TIME:   1 MIN 59 SFT 3 FLUOROSCOPIC IMAGES FINDINGS: Fluoroscopic guidance provided for surgical procedure  Intraoperative localization initially, to the T12 level  Osseous and soft tissue detail limited by technique  Impression: Fluoroscopic guidance provided for surgical procedure  Posterior fusion T12-L5 interconnecting rods and bilateral pedicle screws  Interbody cages at L4 placed are stable  Please refer to the separate procedure notes for additional details  Workstation performed: [id number]     EKG, Pathology, and Other Studies: I have personally reviewed pertinent reports        VTE Pharmacologic Prophylaxis: Heparin    VTE Mechanical Prophylaxis: sequential compression device

## 2018-06-08 NOTE — DISCHARGE SUMMARY
Discharge Summary - Neurosurgery   Vero Owens 74 y o  female MRN: [id number]  [facility] Encounter: [id number]    Admission Date:   Admission Orders     Ordered        06/04/18 1636  Inpatient Admission  Once                Discharge Date: 6/8/2018    Admitting Diagnosis: Status post lumbar spinal fusion [Z98 1]  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]  Lumbar stenosis with neurogenic claudication [M48 062]  Battery end of life of spinal cord stimulator [Z45 42]    Discharge Diagnosis:   1  Status post  revision lumbar fusion with extension from T12-L5 with decompression T12-L4  2  Status post  replacement of spinal cord stimulator IPG  3  Failed back surgical syndrome  4  Lumbar stenosis with neurogenic claudication  5  Opioid dependence  6  Fibromyalgia  7  Type 2 diabetes  8  Asthma without exacerbation  9  Hypertension  10  Hyperlipidemia    Resolved Problems  Date Reviewed: 6/3/2018          Resolved    Lumbar stenosis with neurogenic claudication 6/8/2018     Resolved by  Loc Lemon PA-C    Battery end of life of spinal cord stimulator 6/8/2018     Resolved by  Loc Lemon PA-C    * (Principal)Compression of spinal cord ([address] ) 6/8/2018     Resolved by  Loc Lemon PA-C          Attending: Dr Aly Mendez Physician(s): Dr Joy Hills, anesthesia acute pain management    Procedures Performed:   1  Reopening of left buttock incision for removal of spinal cord stimulator IPG and placement of new Medtronic IPG ([id number])  2  Reopening of lumbar incision for inspection of previous L4-5 fusion and extension of fusion from T12-L5 with Medtronic Solara system, locally harvested autograft, Mastergraft  3   T12-L4 posterior decompression with bilateral foraminotomies  Hospital Course:  42-year-old female with progressive lumbar neurogenic claudication possible myelopathy with thoracolumbar spinal stenosis from T12-L4    She also has a in left buttock IPG for spinal cord stimulator which is end of life requiring increased charging time and more frequent charging  For this reason, patient was found to be an appropriate candidate for the above-noted procedure  Surgery was without complications  Operative findings include severe stenosis at L3-4, L2-3 and T12-L1 secondary to facet ligamentous hypertrophy  Electrophysiological monitoring remained stable throughout the case  Intraoperative spinal cord stimulator produced adequate bilateral lower extremity coverage  Once medically stabilized, she was transferred to medical-surgical floor  Patient difficulties with pain management the postoperative setting  Adjustments were made multiple times with the recommendations of the acute pain service  In addition, a [employer] representative adjusted and provided additional education regarding spinal cord stimulator  Patient achieved acceptable pain control at the time of discharge  Upright x-rays completed with good hardware placement alignment  Luis-Neves drain was with high output  Section changed to thumbprint and drain was discontinued on postoperative day three  Patient with baseline and anemia with a hemoglobin dropped to 6 8 for which she was transfused 2 units of packed red blood cells  Hemoglobin improved to 9 5 at the time of discharge  Patient was mobilized with physical and occupational therapy cleared her for discharge home with family support  Would she was continued on and DVT prophylaxis to include heparin bilateral sequential compression devices during hospitalization  Patient was continued on close glucose control for her chronic diabetes with her home regimen and and insulin sliding scale  She did experience mild tachycardia which was felt to be related to pain as there was no change regardless of hemoglobin level  On the day of discharge patient was medically and neurologically stable    Discharge instructions were reviewed with the patient and her daughter  All questions were answered  Patient was discharged home with outpatient follow-up in 2 weeks  Condition at Discharge: good     Discharge instructions/Information to patient and family:   See after visit summary for information provided to patient and family  Provisions for Follow-Up Care:  See after visit summary for information related to follow-up care and any pertinent home health orders  Disposition: Home    Planned Readmission: No    Discharge Statement   I spent 30 minutes discharging the patient  This time was spent on the day of discharge  I had direct contact with the patient on the day of discharge  Additional documentation is required if more than 30 minutes were spent on discharge  Discharge Medications:  See after visit summary for reconciled discharge medications provided to patient and family

## 2018-06-08 NOTE — RESTORATIVE TECHNICIAN NOTE
Restorative Specialist Mobility Note       Activity: Other (Comment) (Patient refused OOB ambulation at this time, as she is currently comfortable and waiting for discharge )

## 2018-06-08 NOTE — DISCHARGE INSTRUCTIONS
Neurosurgery discharge instructions  Posterior Lumbar Decompression and Fusion/Fixation Discharge instructions:     Please keep incision clean and dry  Avoid applying creams, lotions or antiseptics to incision area   Check the wound daily  If the incision becomes red, swollen, tender, warm, or has increased drainage please notify MD immediately   May shower 5 days after surgery, but do not soak in a tub and no swimming   No bending or twisting at the waist  No heavy lifting greater than 5 - 10lbs   No prolonged sitting greater than 30 minutes, but may walk as tolerated   Take few short walks each day  Increase your walking time as you heal    Please take pain medications to relieved incision pain, and muscle relaxants to prevent spasms as directed by MD or Extender  See Medication reconciliation completed at time of discharge   Please take over the counter stool softeners such as colace or senna-s to avoid constipation while on narcotics   No driving for two weeks or while on narcotics or muscle relaxants   Do not take ibuprofen, Naproxen/Aleve or any NSAID: May take Tylenol instead   If taking Coumadin, Aspirin, or Plavix, you may resume these medications once cleared by Neurosurgery   Return for your follow up appointment in 2 weeks for an incision check and staple removal as scheduled  Follow-up 6 weeks after surgery with repeat upright x-rays to be completed prior to visit  **Please notify MD if you experience a fever 101  F or have increased back or leg pain, numbness and/or weakness in your legs**

## 2018-06-12 ENCOUNTER — TRANSITIONAL CARE MANAGEMENT (OUTPATIENT)
Dept: FAMILY MEDICINE CLINIC | Facility: CLINIC | Age: 73
End: 2018-06-12

## 2018-06-13 DIAGNOSIS — J06.9 UPPER RESPIRATORY TRACT INFECTION, UNSPECIFIED TYPE: Primary | ICD-10-CM

## 2018-06-13 DIAGNOSIS — R42 VERTIGO: ICD-10-CM

## 2018-06-13 DIAGNOSIS — K31.84 GASTROPARESIS: ICD-10-CM

## 2018-06-13 DIAGNOSIS — N32.81 OVERACTIVE BLADDER: ICD-10-CM

## 2018-06-13 RX ORDER — OXYBUTYNIN CHLORIDE 15 MG/1
15 TABLET, EXTENDED RELEASE ORAL DAILY
Qty: 30 TABLET | Refills: 3 | Status: SHIPPED | OUTPATIENT
Start: 2018-06-13 | End: 2019-03-04 | Stop reason: SDUPTHER

## 2018-06-13 RX ORDER — METOCLOPRAMIDE 5 MG/1
5 TABLET ORAL 4 TIMES DAILY
Qty: 120 TABLET | Refills: 3 | Status: SHIPPED | OUTPATIENT
Start: 2018-06-13 | End: 2018-11-25 | Stop reason: SDUPTHER

## 2018-06-13 RX ORDER — MECLIZINE HYDROCHLORIDE 25 MG/1
25 TABLET ORAL EVERY 8 HOURS SCHEDULED
Qty: 30 TABLET | Refills: 0 | Status: SHIPPED | OUTPATIENT
Start: 2018-06-13 | End: 2018-07-03 | Stop reason: SDUPTHER

## 2018-06-13 RX ORDER — AZITHROMYCIN 250 MG/1
TABLET, FILM COATED ORAL
Qty: 6 TABLET | Refills: 0 | Status: SHIPPED | OUTPATIENT
Start: 2018-06-13 | End: 2018-06-17

## 2018-06-13 RX ORDER — DEXTROMETHORPHAN HYDROBROMIDE AND PROMETHAZINE HYDROCHLORIDE 15; 6.25 MG/5ML; MG/5ML
5 SYRUP ORAL 4 TIMES DAILY PRN
Qty: 180 ML | Refills: 0 | Status: SHIPPED | OUTPATIENT
Start: 2018-06-13 | End: 2018-11-20 | Stop reason: ALTCHOICE

## 2018-06-18 ENCOUNTER — CLINICAL SUPPORT (OUTPATIENT)
Dept: NEUROSURGERY | Facility: CLINIC | Age: 73
End: 2018-06-18

## 2018-06-18 ENCOUNTER — DOCUMENTATION (OUTPATIENT)
Dept: NEUROSURGERY | Facility: CLINIC | Age: 73
End: 2018-06-18

## 2018-06-18 ENCOUNTER — TELEPHONE (OUTPATIENT)
Dept: FAMILY MEDICINE CLINIC | Facility: CLINIC | Age: 73
End: 2018-06-18

## 2018-06-18 VITALS
WEIGHT: 163 LBS | TEMPERATURE: 98.8 F | HEIGHT: 60 IN | DIASTOLIC BLOOD PRESSURE: 70 MMHG | BODY MASS INDEX: 32 KG/M2 | SYSTOLIC BLOOD PRESSURE: 138 MMHG | RESPIRATION RATE: 18 BRPM

## 2018-06-18 DIAGNOSIS — M48.062 LUMBAR STENOSIS WITH NEUROGENIC CLAUDICATION: ICD-10-CM

## 2018-06-18 DIAGNOSIS — Z98.890 POST-OPERATIVE STATE: Primary | ICD-10-CM

## 2018-06-18 PROCEDURE — 99024 POSTOP FOLLOW-UP VISIT: CPT

## 2018-06-18 RX ORDER — OXYCODONE HYDROCHLORIDE 15 MG/1
15 TABLET ORAL EVERY 6 HOURS PRN
Qty: 24 TABLET | Refills: 0 | Status: SHIPPED | OUTPATIENT
Start: 2018-06-18 | End: 2018-06-25 | Stop reason: SDUPTHER

## 2018-06-18 RX ORDER — GABAPENTIN 300 MG/1
300 CAPSULE ORAL 2 TIMES DAILY
Qty: 60 CAPSULE | Refills: 3 | Status: SHIPPED | OUTPATIENT
Start: 2018-06-18 | End: 2018-07-26 | Stop reason: SDUPTHER

## 2018-06-18 RX ORDER — METHOCARBAMOL 750 MG/1
750 TABLET, FILM COATED ORAL EVERY 6 HOURS SCHEDULED
Qty: 60 TABLET | Refills: 0 | Status: SHIPPED | OUTPATIENT
Start: 2018-06-18 | End: 2018-07-17 | Stop reason: SDUPTHER

## 2018-06-18 NOTE — PROGRESS NOTES
Post-Op Visit-Neurosurgery    Jessica Nevarez 74 y o  female MRN: [id number]    Chief Complaint  Patient presents post: Reopening of lumbar incision for T12-L5 posterior instrumented fixation and fusion and T12-L4 posterior decompression (N/A Spine Lumbar) and Removal of left buttock implantable pulse generator and placement of new  implantable pulse generator (Left Buttocks)    History of Present Illness  Patient presents for 2 week POV for incision check     Assessment  Wound Exam: Incisions are clean dry and in tact well approximated without redness, swelling, warmth or drainage  Procedure  Staple/suture removal    location: Lumbar spine region, Left upper buttock  Procedure Note: 40 staples (lumbar spine region) and 1 drain sutures were removed (R of lumbar incision), dissolvable sutures observed (L upper buttock)  Patient Status:  the patient tolerated the procedure well  Complications: None  Steri stips placed at upper portion of the incision where superficial skin was slightly opened  Staples holes had some mild serous drainage mid incision  Discussion/Summary  Patient arrived to visit with her daughter and was very pleasant  She ambulated moderately well with rolling walker, had a slow gait  She reports 5/10 pain in her lower back and when asked about her previously reported muscle weakness/clumsiness she stated "I've been using the walker, so its hard to say " Patient reports that she has been taking her time more and has been doing physical therapy which she feels is helpful  She reports that her daughter redressed her incision and wanted to know if she could wear her bra  Advised that she could have the dressing for comfort or if there was any drainage however it was not necessary at this time  Advised she could wear her bra if is caused her no discomfort   Left buttock incision was still covered with mepilex patient reported it had been there since surgery  Advised it was not necessary to cover unless she requires it for comfort  Requested refill of pain medication and muscle relaxer oxycodone and robaxin  PA EM reordered these medications with modification to the frequency of the oxycodone which was changed to Q6H PRN for pain  Explained that the goal of the SCS was to ween off pain medications  Patient understood and was appreciative  Reviewed incision care with patient including daily observation for s/s infection including: increased erythema, edema, drainage, dehiscence of incision or fever >101  Should these be observed, she understands that she is too call and/or return immediately for reassessment  Advised patient to continue cleansing area with mild soap and water and pat dry  Not to apply any lotions, creams, or ointments, & not to submerge in any water for two more weeks  She is to maintain activity restrictions until cleared by the surgeon  advised to limit bend/twisting at the waist and ambulation is encouraged as tolerated  Verified date/time/location of upcoming POV and reminded Johanne Wakefield to complete x-rays prior to her visit on 7/17/2018 provided Rx however patient plans to go to [facility] for xray   She is to call the office with any further questions or concerns, or if any incisional issues or fevers would arise

## 2018-06-19 ENCOUNTER — APPOINTMENT (OUTPATIENT)
Dept: LAB | Facility: CLINIC | Age: 73
End: 2018-06-19
Payer: COMMERCIAL

## 2018-06-19 ENCOUNTER — OFFICE VISIT (OUTPATIENT)
Dept: FAMILY MEDICINE CLINIC | Facility: CLINIC | Age: 73
End: 2018-06-19

## 2018-06-19 VITALS
DIASTOLIC BLOOD PRESSURE: 72 MMHG | BODY MASS INDEX: 31.83 KG/M2 | HEART RATE: 72 BPM | WEIGHT: 163 LBS | RESPIRATION RATE: 12 BRPM | SYSTOLIC BLOOD PRESSURE: 120 MMHG | TEMPERATURE: 98 F

## 2018-06-19 DIAGNOSIS — N39.0 URINARY TRACT INFECTION WITHOUT HEMATURIA, SITE UNSPECIFIED: ICD-10-CM

## 2018-06-19 DIAGNOSIS — I10 ESSENTIAL HYPERTENSION: ICD-10-CM

## 2018-06-19 DIAGNOSIS — D50.8 IRON DEFICIENCY ANEMIA SECONDARY TO INADEQUATE DIETARY IRON INTAKE: ICD-10-CM

## 2018-06-19 DIAGNOSIS — E11.8 TYPE 2 DIABETES MELLITUS WITH COMPLICATION, WITHOUT LONG-TERM CURRENT USE OF INSULIN (HCC): ICD-10-CM

## 2018-06-19 DIAGNOSIS — M48.061 DEGENERATIVE LUMBAR SPINAL STENOSIS: ICD-10-CM

## 2018-06-19 DIAGNOSIS — Z98.1 STATUS POST LUMBAR SPINAL FUSION: Primary | ICD-10-CM

## 2018-06-19 PROBLEM — H40.9 GLAUCOMA: Status: ACTIVE | Noted: 2017-01-06

## 2018-06-19 PROBLEM — E03.9 HYPOTHYROIDISM: Status: ACTIVE | Noted: 2017-11-20

## 2018-06-19 LAB
HCT VFR BLD AUTO: 34.9 % (ref 34.8–46.1)
HGB BLD-MCNC: 10.4 G/DL (ref 11.5–15.4)

## 2018-06-19 PROCEDURE — RECHECK: Performed by: NURSE PRACTITIONER

## 2018-06-19 PROCEDURE — 36415 COLL VENOUS BLD VENIPUNCTURE: CPT

## 2018-06-19 PROCEDURE — 85018 HEMOGLOBIN: CPT

## 2018-06-19 PROCEDURE — 85014 HEMATOCRIT: CPT

## 2018-06-19 NOTE — PROGRESS NOTES
Date and time hospital follow up call was made:  6/12/2018  1:54 PM  Patient was hopsitalized at:  [facility]  Date of admission:  6/4/18  Date of discharge:  6/8/18  Diagnosis:  STATUS POST BACK SURGERY  Disposition:  Home, Home health services  Were the patients medicaitons reviewed and updated:  Yes  Current symptoms:  None  Scheduled for follow up?:  Yes  Patients specialists:  Other (comment)  Other specialists Name:  [facility] 6-18-18  Other specialists contact #:  Angel VALENCIA(INTERNAL MED )   Did you obtain your prescribed medications:  Yes  Do you need help managing your perscriptions or medications:  No  I have advised the patient to call PCP with any new or worsening symptoms (please type in name along with any credentials): FLO GOLD LPN  Are you recieving outpatient services:  Yes  What type(s) of services:  PT/OT  Are you recieving home care services:  No       Patient ID: Brett Alberto is a 74 y o  female      Chief complaint: 74 y  o female presenting with her daughter for hospital follow up S/P reopening of lumbar incision T12-L5 posterior instrumented fixation and fusion T12-L4 posterior decompression, removal of left buttock implantable pulse generator and placement of new implantable pulse generator      HMI: Patient was admitted on  06/04/18 to 06/08/18 to [address] [place] [address] for S/P lumbar spinal fusion, failed back surgical syndrome, lumbar stenosis with neurogenic claudication and battery end of life of spinal cord stimulator  Patient discharged on 06/08/18 with the diagnoses of S/P revision lumbar fusion with extension from T12-L5 with decompression T12-L4, replacement of spinal cord stimulator IPG, failed back surgical syndrome, lumbar stenosis with neurogenic claudication, opioid dependence, fibromyalgia, type 2 diabetes mellitus, asthma exacerbation, hypertension and hyperlipidemia    Patient required PRBC transfusions to treat anemia during admission  Patient was seen in the hospital by the [employer] representative to turn on and adjust spinal stimulator for pain management  Pain management was consulted while patient was in hospital due to difficulty controlling pain immediately post-operatively  Patient as a follow-up appointment scheduled with Dr Hoda Mendez on 07/17/18     Leim Hatchet    Family History   Problem Relation Age of Onset    Lung cancer Mother     Pulmonary embolism Father     Stroke Maternal Grandmother     Heart attack Maternal Grandfather     No Known Problems Paternal Grandmother     No Known Problems Paternal Grandfather     Diabetes Family         Diabetes mellitus    Hypertension Family     Stroke Family         Stroke complications     Social History     Social History    Marital status:       Spouse name: N/A    Number of children: N/A    Years of education: N/A     Occupational History    Retired      Social History Main Topics    Smoking status: Former Smoker     Quit date: 1/1/1970    Smokeless tobacco: Never Used      Comment: Denied history of current ever day smoker, Former smoker and Never smoker all documented in [employer]    Alcohol use No      Comment: Denied history of alcohol use    Drug use: No      Comment: Denied history of drug use    Sexual activity: Not Currently     Other Topics Concern    Not on file     Social History Narrative    Marital History - Currently  per [employer]         Past Medical History:   Diagnosis Date    Acid reflux     Anxiety     Arthritis     Asthma     Chronic narcotic dependence ([facility] [address] )     Chronic pain     Colon polyp     Cystocele     [facility]     Diverticulosis     Dysfunctional uterine bleeding     last assessed - [id number]    Fibromyalgia     Gastric ulcer     Gastroparesis     History of colonic polyps     last assessed - [id number]    History of gastroesophageal reflux (GERD)     Hypercholesterolemia     Hyperlipidemia     Hypertension     IBS (irritable bowel syndrome)     Kidney stone     Post laminectomy syndrome     Seasonal allergies     Spinal stenosis      Past Surgical History:   Procedure Laterality Date    APPENDECTOMY      BACK SURGERY      CHOLECYSTECTOMY      ESOPHAGOGASTRODUODENOSCOPY N/A 9/28/2016    Procedure: ESOPHAGOGASTRODUODENOSCOPY (EGD); Surgeon: Jeimy Garcia MD;  [facility]; Service:     HERNIA REPAIR      HYSTERECTOMY      TTAH-BSO last assessed - [id number]    LAMINECTOMY      LUMBAR LAMINECTOMY      [place] ARTHRODESIS POSTERIOR/POSTEROLATERAL THORACIC N/A 6/4/2018    Procedure: Reopening of lumbar incision for T12-L5 posterior instrumented fixation and fusion and T12-L4 posterior decompression;  Surgeon: Chun Lizama MD;  [facility];  Service: Neurosurgery    NY COLONOSCOPY FLX DX W/COLLJ SPEC WHEN PFRMD N/A 3/2/2016    Procedure: EGD AND COLONOSCOPY;  Surgeon: Jeimy Garcia MD;  [facility]; Service: Gastroenterology    NY DILATE ESOPHAGUS N/A 9/28/2016    Procedure: DILATATION ESOPHAGEAL;  Surgeon: Jeimy Garcia MD;  [facility]; Service: Gastroenterology    NY ESOPHAGOGASTRODUODENOSCOPY TRANSORAL DIAGNOSTIC N/A 7/18/2016    Procedure: ESOPHAGOGASTRODUODENOSCOPY (EGD); Surgeon: Jeimy Garcia MD;  [facility];   Service: Gastroenterology    [place] IMPLANT SPINAL NEUROSTIM/ Left 6/4/2018    Procedure: removal of left buttock implantable pulse generator and placement of new  implantable pulse generator;  Surgeon: Chun Lizama MD;  [facility];  Service: Neurosurgery     Allergies   Allergen Reactions    Penicillins Anaphylaxis     Other reaction(s): Unknown Reaction    Sulfa Antibiotics Anaphylaxis     Other reaction(s): Unknown Reaction    Aspartame Other (See Comments) and Hypertension     Slurred speech, weakness, stroke sx    Iodinated Diagnostic Agents Hives     Pt has taken prep prior for contrast and has not had any break through reaction    Other Hives     Other reaction(s): Unknown Reaction  Contrast dye         Current Outpatient Prescriptions:     acetaminophen (TYLENOL) 325 mg tablet, Take 3 tablets (975 mg total) by mouth every 8 (eight) hours as needed for mild pain, Disp: 30 tablet, Rfl: 0    Azilsartan Medoxomil (EDARBI) 40 MG TABS, Take 40 mg by mouth daily  , Disp: , Rfl:     dicyclomine (BENTYL) 10 mg capsule, Take 1 capsule (10 mg total) by mouth 3 (three) times a day as needed (colon spasms) for up to 30 days, Disp: 90 capsule, Rfl: 2    docusate sodium (COLACE) 100 mg capsule, Take 1 capsule (100 mg total) by mouth 2 (two) times a day, Disp: 30 capsule, Rfl: 0    fentaNYL (DURAGESIC) 50 mcg/hr, Place 1 patch on the skin every 3 (three) days Max Daily Amount: 1 patch, Disp: 10 patch, Rfl: 0    gabapentin (NEURONTIN) 300 mg capsule, Take 1 capsule (300 mg total) by mouth 2 (two) times a day for 30 days, Disp: 60 capsule, Rfl: 3    guaiFENesin 400 mg, Take 400 mg by mouth daily as needed for cough, Disp: , Rfl:     IRON PO, Take 65 mg by mouth 2 (two) times a day  , Disp: , Rfl:     latanoprost (XALATAN) 0 005 % ophthalmic solution, ADMINISTER 1 DROP TO BOTH EYES DAILY AT BEDTIME FOR 30 DAYS, Disp: 2 5 mL, Rfl: 0    levothyroxine 25 mcg tablet, Take 25 mcg by mouth daily, Disp: , Rfl:     loratadine (CLARITIN) 10 mg tablet, Take 10 mg by mouth daily as needed for allergies, Disp: , Rfl:     Lutein 10 MG TABS, Take 10 mg by mouth daily  , Disp: , Rfl:     meclizine (ANTIVERT) 25 mg tablet, Take 1 tablet (25 mg total) by mouth every 8 (eight) hours, Disp: 30 tablet, Rfl: 0    metFORMIN (GLUCOPHAGE) 1000 MG tablet, Take 1,000 mg by mouth 2 (two) times a day with meals, Disp: , Rfl:     methocarbamol (ROBAXIN) 750 mg tablet, Take 1 tablet (750 mg total) by mouth every 6 (six) hours, Disp: 60 tablet, Rfl: 0    metoclopramide (REGLAN) 5 mg tablet, Take 1 tablet (5 mg total) by mouth 4 (four) times a day for 30 days, Disp: 120 tablet, Rfl: 3    Milnacipran HCl (SAVELLA) 100 MG TABS, Take 1 tablet by mouth daily, Disp: , Rfl:     oxybutynin (DITROPAN XL) 15 MG 24 hr tablet, Take 1 tablet (15 mg total) by mouth daily for 30 days, Disp: 30 tablet, Rfl: 3    oxyCODONE (ROXICODONE) 15 mg immediate release tablet, Take 1 tablet (15 mg total) by mouth every 6 (six) hours as needed for severe pain Max Daily Amount: 60 mg, Disp: 24 tablet, Rfl: 0    pantoprazole (PROTONIX) 40 mg tablet, Take 1 tablet (40 mg total) by mouth 2 (two) times daily after meals for 30 days, Disp: 60 tablet, Rfl: 3    pitavastatin (LIVALO) 2 mg, Take 2 mg by mouth daily with dinner  , Disp: , Rfl:     polyethylene glycol (MIRALAX) 17 g packet, Take 17 g by mouth daily, Disp: 14 each, Rfl: 0    promethazine-dextromethorphan (PHENERGAN-DM) 6 25-15 mg/5 mL oral syrup, Take 5 mL by mouth 4 (four) times a day as needed for cough, Disp: 180 mL, Rfl: 0    pyridoxine (VITAMIN B6) 100 mg tablet, Take 100 mg by mouth daily, Disp: , Rfl:     Review of Systems  Constitutional: Denies fever, chills, night sweats, fatigue ,weakness ,weight changes (loss or gain)     ENT: Denies changes in vision, changes in hearing/tinnitus ( earache, loss of hearing), nasal/sinus problems (earache, nosebleed, nasal discharge,nasal congestion),sore throat, hoarseness or dry mouth  Pulmonary: Positive for morning cough for thinning secretions since discharge    Denies shortness of breath, dyspnea on exertion, wheezing, post nasal drip   Cardiovascular:  Denies slow/fast heart beat, palpations, orthopnea, lower extremity edema, leg claudication or decrease exercise tolerance  Hematology: Patient anemic prior to surgery and post - operatively requiring blood transfussions  Abdomen:  Patient as been having bowel movements without issue and eating three meals a day   Denies abdominal pain, anorexia, indigestion, nausea, vomiting, constipation, diarrhea or dysphagia  Genitourinary:  Positive for dysuria and pelvic pain   Patient as history of UTI  Denies nocturia, hesitancy, incontinence or hematuria  Musculoskeletal: Positive for myalgia with movement, using a walker to ambulate and receiving physical therapy in her home to build muscle strength and balance    Denies arthralgias, joint swelling, joint stiffness , limb pain, limb swelling  Skin: Operative incision from T12-L5 with staples removed by surgeon's office on 06/18/18  With small amount of drainage noticed at L2-L3 area by her daughter  Denies skin rash, skin lesion, skin wound, itching, dry skin, changes in moles or hair changes  Neuro: Denies headache, numbness, tingling, confusion, loss of consciousness, dizziness, vertigo or tremors  Psychiatric: Denies feelings of depression, suicidal ideation, anxiety, sleep disturbances    OBJECTIVE    Constitutional:   NAD, Pleasant 74year old female well appearing and well nourished in no acute distress     ENT:   Conjunctiva and lids: no injection, edema, or discharge     Pupils and iris: JAISON bilaterally    External inspection of ears and nose: normal without deformities or discharge  Otoscopic exam: Canals patent without erythema  Nasal mucosa, septum and turbinates: Normal or edema or discharge         Oropharynx:  Moist mucosa, normal tongue and tonsils without lesions  No erythema        Pulmonary:Respiratory effort normal rate and rhythm, no increased work of breathing   Auscultation of lungs:  Clear bilaterally with no adventitious breath sounds       Cardiovascular: regular rate and rhythm, S1 and S2, no murmur, no edema and/or varicosities of LE      Abdomen: Soft and non-distended     Positive bowel sounds      No heptomegaly or splenomegaly     Genitourinary:  Will obtain a POCT urine dip when patient able to give sample  Lymphatic:  No anterior or posterior cervical lymphadenopathy         Musculoskeletal:  Gait and station: ambulated moderately well with rolling walker, has a slow gait  She reports 5/10 pain in her lower back and when asked about her previously muscle weakness she states "I've been using the walker, so its hard to say "   Digits and nails normal without clubbing or cyanosis       Inspection/palpation of joints, bones, and muscles:  No joint tenderness, swelling, full active and passive range of motion       Skin: Normal skin color, texture and turgor and no rashes    Incision line well approximated with steri-strips located at top of incision line and bandage applied at L2-L3 for small amount of serous drainage  Sutures noted on left upper buttock   Drain location site healing well  Neuro:    Normal  CN 2-12     Normal reflexes     Normal sensation    Psych:   alert and oriented to person, place and time     normal mood and affect       Assessment/Plan: Diagnoses and all orders for this visit:    Status post lumbar spinal fusion    Iron deficiency anemia secondary to inadequate dietary iron intake  -     Hematocrit;  Future  -     Hemoglobin; Future    Type 2 diabetes mellitus with complication, without long-term current use of insulin (HCC)    Essential hypertension    Urinary tract infection without hematuria, site unspecified    Degenerative lumbar spinal stenosis      #1 S/P lumbar spinal fusion  Patient is ambulating with walker and receiving home physical therapy without problems  Reviewed with patient pain management with current pain scale of 5/10 with activities, patient taking medication as prescribed with only once incident report of forgotten dose and increased pain occurrence  #2 Iron deficiency anemia  Reviewed with patient meals consuming with iron rich and high protein foods stressed to be taken  Will recheck H & H and will treat accordingly when results obtained  #3 Type 2 diabetes   Blood glucose well managed with morning glucose reported to be around 140  Patient to continue on current medication regimen  #4 Hypertension  Patient well managed on medication and confirmed compliance of medication  #5 Urinary tract infection  Will await results of an POCT urine dip and treat accordingly  Patient instructed to call for problems or concern prior to next office visit

## 2018-06-20 DIAGNOSIS — N39.0 URINARY TRACT INFECTION WITHOUT HEMATURIA, SITE UNSPECIFIED: Primary | ICD-10-CM

## 2018-06-20 RX ORDER — CIPROFLOXACIN 500 MG/1
500 TABLET, FILM COATED ORAL EVERY 12 HOURS SCHEDULED
Qty: 28 TABLET | Refills: 1 | Status: SHIPPED | OUTPATIENT
Start: 2018-06-20 | End: 2018-07-03 | Stop reason: ALTCHOICE

## 2018-06-25 DIAGNOSIS — M48.062 LUMBAR STENOSIS WITH NEUROGENIC CLAUDICATION: ICD-10-CM

## 2018-06-25 DIAGNOSIS — K21.9 GASTROESOPHAGEAL REFLUX DISEASE WITHOUT ESOPHAGITIS: ICD-10-CM

## 2018-06-25 RX ORDER — OXYCODONE HYDROCHLORIDE 15 MG/1
15 TABLET ORAL EVERY 6 HOURS PRN
Qty: 120 TABLET | Refills: 0 | Status: SHIPPED | OUTPATIENT
Start: 2018-06-25 | End: 2018-07-26 | Stop reason: SDUPTHER

## 2018-06-25 RX ORDER — PANTOPRAZOLE SODIUM 40 MG/1
40 TABLET, DELAYED RELEASE ORAL
Qty: 60 TABLET | Refills: 3 | Status: SHIPPED | OUTPATIENT
Start: 2018-06-25 | End: 2018-11-12 | Stop reason: SDUPTHER

## 2018-07-02 DIAGNOSIS — M79.7 FIBROMYALGIA: Primary | ICD-10-CM

## 2018-07-03 DIAGNOSIS — M79.7 FIBROMYALGIA: ICD-10-CM

## 2018-07-03 DIAGNOSIS — R42 VERTIGO: ICD-10-CM

## 2018-07-03 DIAGNOSIS — N39.0 URINARY TRACT INFECTION WITHOUT HEMATURIA, SITE UNSPECIFIED: Primary | ICD-10-CM

## 2018-07-03 RX ORDER — DOXYCYCLINE HYCLATE 100 MG/1
100 CAPSULE ORAL EVERY 12 HOURS SCHEDULED
Qty: 20 CAPSULE | Refills: 1 | Status: SHIPPED | OUTPATIENT
Start: 2018-07-03 | End: 2018-07-13

## 2018-07-03 RX ORDER — MECLIZINE HYDROCHLORIDE 25 MG/1
25 TABLET ORAL EVERY 8 HOURS SCHEDULED
Qty: 30 TABLET | Refills: 3 | Status: SHIPPED | OUTPATIENT
Start: 2018-07-03 | End: 2018-07-26 | Stop reason: SDUPTHER

## 2018-07-07 ENCOUNTER — HOSPITAL ENCOUNTER (OUTPATIENT)
Dept: RADIOLOGY | Facility: HOSPITAL | Age: 73
Discharge: HOME | End: 2018-07-07
Payer: COMMERCIAL

## 2018-07-07 ENCOUNTER — TRANSCRIBE ORDERS (OUTPATIENT)
Dept: ADMINISTRATIVE | Facility: HOSPITAL | Age: 73
End: 2018-07-07

## 2018-07-07 DIAGNOSIS — M48.062 LUMBAR STENOSIS WITH NEUROGENIC CLAUDICATION: ICD-10-CM

## 2018-07-07 PROCEDURE — 72080 X-RAY EXAM THORACOLMB 2/> VW: CPT

## 2018-07-17 DIAGNOSIS — M48.061 SPINAL STENOSIS OF LUMBAR REGION, UNSPECIFIED WHETHER NEUROGENIC CLAUDICATION PRESENT: ICD-10-CM

## 2018-07-17 DIAGNOSIS — M48.062 LUMBAR STENOSIS WITH NEUROGENIC CLAUDICATION: ICD-10-CM

## 2018-07-17 RX ORDER — FENTANYL 50 UG/H
1 PATCH TRANSDERMAL
Qty: 10 PATCH | Refills: 0 | Status: SHIPPED | OUTPATIENT
Start: 2018-07-17 | End: 2019-02-18 | Stop reason: DRUGHIGH

## 2018-07-17 RX ORDER — METHOCARBAMOL 750 MG/1
750 TABLET, FILM COATED ORAL EVERY 6 HOURS SCHEDULED
Qty: 60 TABLET | Refills: 3 | Status: SHIPPED | OUTPATIENT
Start: 2018-07-17 | End: 2018-08-29 | Stop reason: CLARIF

## 2018-07-18 ENCOUNTER — OFFICE VISIT (OUTPATIENT)
Dept: NEUROSURGERY | Facility: CLINIC | Age: 73
End: 2018-07-18

## 2018-07-18 VITALS
WEIGHT: 174 LBS | TEMPERATURE: 97 F | HEART RATE: 101 BPM | DIASTOLIC BLOOD PRESSURE: 82 MMHG | BODY MASS INDEX: 34.16 KG/M2 | RESPIRATION RATE: 20 BRPM | SYSTOLIC BLOOD PRESSURE: 143 MMHG | HEIGHT: 60 IN

## 2018-07-18 DIAGNOSIS — Z96.89 S/P INSERTION OF SPINAL CORD STIMULATOR: ICD-10-CM

## 2018-07-18 DIAGNOSIS — Z98.1 STATUS POST LUMBAR SPINAL FUSION: Primary | ICD-10-CM

## 2018-07-18 PROCEDURE — 99024 POSTOP FOLLOW-UP VISIT: CPT | Performed by: NEUROLOGICAL SURGERY

## 2018-07-18 NOTE — PROGRESS NOTES
Office Note - Neurosurgery   Laury Wellington 74 y o  female MRN: [id number]      Assessment:    Patient is rapidly improving  74year-oldWoman approximately 6 weeks post thoracolumbar revision decompression and fusion and replacement of left buttock IPG  She is doing well clinically with improvement in her gait, standing and walking tolerance and overall pain control  I encouraged her to gradually increase her activity level  She will follow-up in 4 months time with flexion-extension film lumbar spine  She will contact this office should any concerns arise in the interim  History, physical examination and diagnostic tests were reviewed and questions answered  Diagnosis, care plan and treatment options were discussed  The patient and family member patient and daughter understand instructions and will follow up as directed  Plan:    Follow-up: in 4 month(s)    Problem List Items Addressed This Visit        Other    Status post lumbar spinal fusion - Primary    Relevant Orders    X-ray lumbar spine flexion and extension only 4+ views    S/P insertion of spinal cord stimulator    Relevant Orders    X-ray lumbar spine flexion and extension only 4+ views          Subjective/Objective     Chief Complaint    Lower back pain  HPI    Approximately 6 weeks post thoracolumbar decompression fusion and replacement of left buttock IP G  Both the patient and her daughter have noticed significant improvement in her standing and walking tolerance  She now ambulates only with a cane  She denies any new difficulties with bowel bladder function  Her new stimulator is charging quickly and holds charge for longer  Overall she is pleased with her progress  She denies any new pain, weakness or numbness in her legs or difficulties with bowel bladder function  She has wean down on her pain medication  ROS    Review of Systems   HENT: Negative  Eyes: Negative  Respiratory: Negative      Cardiovascular: Negative  Gastrointestinal: Negative  Endocrine: Negative  Genitourinary: Positive for frequency and urgency  Leakage   Musculoskeletal: Negative for back pain  Skin: Negative  Allergic/Immunologic: Negative  Neurological: Positive for weakness (bilateral legs) and headaches  Negative for dizziness, seizures, syncope, light-headedness and numbness  Hematological: Negative  Psychiatric/Behavioral: Negative  Family History    Family History   Problem Relation Age of Onset    Lung cancer Mother     Pulmonary embolism Father     Stroke Maternal Grandmother     Heart attack Maternal Grandfather     No Known Problems Paternal Grandmother     No Known Problems Paternal Grandfather     Diabetes Family         Diabetes mellitus    Hypertension Family     Stroke Family         Stroke complications       Social History    Social History     Social History    Marital status:       Spouse name: N/A    Number of children: N/A    Years of education: N/A     Occupational History    Retired      Social History Main Topics    Smoking status: Former Smoker     Quit date: 1/1/1970    Smokeless tobacco: Never Used      Comment: Denied history of current ever day smoker, Former smoker and Never smoker all documented in [employer]    Alcohol use No      Comment: Denied history of alcohol use    Drug use: No      Comment: Denied history of drug use    Sexual activity: Not Currently     Other Topics Concern    Not on file     Social History Narrative    Marital History - Currently  per [employer]           Past Medical History    Past Medical History:   Diagnosis Date    Acid reflux     Anxiety     Arthritis     Asthma     Chronic narcotic dependence ([facility] [address] [facility]     Chronic pain     Colon polyp     Cystocele     [facility]     Diverticulosis     Dysfunctional uterine bleeding     last assessed - [id number]    Fibromyalgia     Gastric ulcer     Gastroparesis     History of colonic polyps     last assessed - [id number]    History of gastroesophageal reflux (GERD)     Hypercholesterolemia     Hyperlipidemia     Hypertension     IBS (irritable bowel syndrome)     Kidney stone     Post laminectomy syndrome     Seasonal allergies     Spinal stenosis        Surgical History    Past Surgical History:   Procedure Laterality Date    APPENDECTOMY      BACK SURGERY      CHOLECYSTECTOMY      ESOPHAGOGASTRODUODENOSCOPY N/A 9/28/2016    Procedure: ESOPHAGOGASTRODUODENOSCOPY (EGD); Surgeon: Hanny Espinal MD;  [facility]; Service:     HERNIA REPAIR      HYSTERECTOMY      TTAH-BSO last assessed - [id number]    LAMINECTOMY      LUMBAR LAMINECTOMY      NM ARTHRODESIS POSTERIOR/POSTEROLATERAL THORACIC N/A 6/4/2018    Procedure: Reopening of lumbar incision for T12-L5 posterior instrumented fixation and fusion and T12-L4 posterior decompression;  Surgeon: Mary Madsen MD;  [facility];  Service: Neurosurgery    NM COLONOSCOPY FLX DX W/COLLJ SPEC WHEN PFRMD N/A 3/2/2016    Procedure: EGD AND COLONOSCOPY;  Surgeon: Hanny Espinal MD;  [facility]; Service: Gastroenterology    NM DILATE ESOPHAGUS N/A 9/28/2016    Procedure: DILATATION ESOPHAGEAL;  Surgeon: Hanny Espinal MD;  [facility]; Service: Gastroenterology    NM ESOPHAGOGASTRODUODENOSCOPY TRANSORAL DIAGNOSTIC N/A 7/18/2016    Procedure: ESOPHAGOGASTRODUODENOSCOPY (EGD); Surgeon: Hanny Espinal MD;  [facility];   Service: Gastroenterology    NM IMPLANT SPINAL NEUROSTIM/ Left 6/4/2018    Procedure: removal of left buttock implantable pulse generator and placement of new  implantable pulse generator;  Surgeon: Mary Madsen MD;  [facility];  Service: Neurosurgery       Medications      Current Outpatient Prescriptions:     acetaminophen (TYLENOL) 325 mg tablet, Take 3 tablets (975 mg total) by mouth every 8 (eight) hours as needed for mild pain, Disp: 30 tablet, Rfl: 0    Azilsartan Medoxomil (EDARBI) 40 MG TABS, Take 40 mg by mouth daily  , Disp: , Rfl:     docusate sodium (COLACE) 100 mg capsule, Take 1 capsule (100 mg total) by mouth 2 (two) times a day, Disp: 30 capsule, Rfl: 0    fentaNYL (DURAGESIC) 50 mcg/hr, Place 1 patch on the skin every 3 (three) days Max Daily Amount: 1 patch, Disp: 10 patch, Rfl: 0    gabapentin (NEURONTIN) 300 mg capsule, Take 1 capsule (300 mg total) by mouth 2 (two) times a day for 30 days, Disp: 60 capsule, Rfl: 3    IRON PO, Take 65 mg by mouth 2 (two) times a day  , Disp: , Rfl:     levothyroxine 25 mcg tablet, Take 25 mcg by mouth daily, Disp: , Rfl:     loratadine (CLARITIN) 10 mg tablet, Take 10 mg by mouth daily as needed for allergies, Disp: , Rfl:     Lutein 10 MG TABS, Take 10 mg by mouth daily  , Disp: , Rfl:     meclizine (ANTIVERT) 25 mg tablet, Take 1 tablet (25 mg total) by mouth every 8 (eight) hours, Disp: 30 tablet, Rfl: 3    metFORMIN (GLUCOPHAGE) 1000 MG tablet, Take 1,000 mg by mouth 2 (two) times a day with meals, Disp: , Rfl:     methocarbamol (ROBAXIN) 750 mg tablet, Take 1 tablet (750 mg total) by mouth every 6 (six) hours, Disp: 60 tablet, Rfl: 3    Milnacipran HCl (SAVELLA) 100 MG TABS, Take 1 tablet (100 mg total) by mouth daily, Disp: 90 tablet, Rfl: 3    oxyCODONE (ROXICODONE) 15 mg immediate release tablet, Take 1 tablet (15 mg total) by mouth every 6 (six) hours as needed for severe pain for up to 30 days Max Daily Amount: 60 mg, Disp: 120 tablet, Rfl: 0    pantoprazole (PROTONIX) 40 mg tablet, Take 1 tablet (40 mg total) by mouth 2 (two) times daily after meals for 30 days, Disp: 60 tablet, Rfl: 3    pitavastatin (LIVALO) 2 mg, Take 2 mg by mouth daily with dinner  , Disp: , Rfl:     dicyclomine (BENTYL) 10 mg capsule, Take 1 capsule (10 mg total) by mouth 3 (three) times a day as needed (colon spasms) for up to 30 days, Disp: 90 capsule, Rfl: 2    guaiFENesin 400 mg, Take 400 mg by mouth daily as needed for cough, Disp: , Rfl:     latanoprost (XALATAN) 0 005 % ophthalmic solution, ADMINISTER 1 DROP TO BOTH EYES DAILY AT BEDTIME FOR 30 DAYS, Disp: 2 5 mL, Rfl: 0    oxybutynin (DITROPAN XL) 15 MG 24 hr tablet, Take 1 tablet (15 mg total) by mouth daily for 30 days, Disp: 30 tablet, Rfl: 3    polyethylene glycol (MIRALAX) 17 g packet, Take 17 g by mouth daily, Disp: 14 each, Rfl: 0    promethazine-dextromethorphan (PHENERGAN-DM) 6 25-15 mg/5 mL oral syrup, Take 5 mL by mouth 4 (four) times a day as needed for cough, Disp: 180 mL, Rfl: 0    pyridoxine (VITAMIN B6) 100 mg tablet, Take 100 mg by mouth daily, Disp: , Rfl:     Allergies    Allergies   Allergen Reactions    Penicillins Anaphylaxis     Other reaction(s): Unknown Reaction    Sulfa Antibiotics Anaphylaxis     Other reaction(s): Unknown Reaction    Aspartame Other (See Comments) and Hypertension     Slurred speech, weakness, stroke sx    Iodinated Diagnostic Agents Hives     Pt has taken prep prior for contrast and has not had any break through reaction    Other Hives     Other reaction(s): Unknown Reaction  Contrast dye         The following portions of the patient's history were reviewed and updated as appropriate: allergies, current medications, past family history, past medical history, past social history, past surgical history and problem list     Investigations    I personally reviewed the XRAY results with the patient:    Upright plain film of the lumbar spine dated July 7th, 2018  Comparison to previous imaging  Stable appearance of T12-L5 instrumentation  No evidence of hardware loosening or failure  Overall stable thoracolumbar alignment  Stable degenerative changes  Physical Exam    Vitals:  Blood pressure 143/82, pulse 101, temperature (!) 97 °F (36 1 °C), resp  rate 20, height 5' (1 524 m), weight 78 9 kg (174 lb)  ,Body mass index is 33 98 kg/m²      Physical Exam   Constitutional: She appears well-developed and well-nourished  Musculoskeletal:        Right lower leg: Normal         Left lower leg: Normal    Neurological:   Normal gait with a cane  Posture improved while ambulating compared to preop  Skin:   Thoracic and lumbar incisions and left buttock incision well healed  Psychiatric: She has a normal mood and affect  Her behavior is normal  Judgment and thought content normal    Vitals reviewed      Neurologic Exam

## 2018-07-18 NOTE — LETTER
July 18, 2018     Mahnaz Golud, [facility] [address] [facility]  [id number]    Patient: Carlo Forrester   YOB: 1945   Date of Visit: 7/18/2018       Dear Dr Devon Martinez:    Thank you for referring Tanmay Momin to me for evaluation  Below are my notes for this consultation  If you have questions, please do not hesitate to call me  I look forward to following your patient along with you  Sincerely,        Jennifer Reid MD        CC: No Recipients  Jennifer Reid MD  7/18/2018  1:46 PM  Sign at close encounter  Office Note - Neurosurgery   Carlo [place] 74 y o  female MRN: [id number]      Assessment:    Patient is rapidly improving  74year-oldWoman approximately 6 weeks post thoracolumbar revision decompression and fusion and replacement of left buttock IPG  She is doing well clinically with improvement in her gait, standing and walking tolerance and overall pain control  I encouraged her to gradually increase her activity level  She will follow-up in 4 months time with flexion-extension film lumbar spine  She will contact this office should any concerns arise in the interim  History, physical examination and diagnostic tests were reviewed and questions answered  Diagnosis, care plan and treatment options were discussed  The patient and family member patient and daughter understand instructions and will follow up as directed  Plan:    Follow-up: in 4 month(s)    Problem List Items Addressed This Visit        Other    Status post lumbar spinal fusion - Primary    Relevant Orders    X-ray lumbar spine flexion and extension only 4+ views    S/P insertion of spinal cord stimulator    Relevant Orders    X-ray lumbar spine flexion and extension only 4+ views          Subjective/Objective     Chief Complaint    Lower back pain  HPI    Approximately 6 weeks post thoracolumbar decompression fusion and replacement of left buttock IP G    Both the patient and her daughter have noticed significant improvement in her standing and walking tolerance  She now ambulates only with a cane  She denies any new difficulties with bowel bladder function  Her new stimulator is charging quickly and holds charge for longer  Overall she is pleased with her progress  She denies any new pain, weakness or numbness in her legs or difficulties with bowel bladder function  She has wean down on her pain medication  ROS    Review of Systems   HENT: Negative  Eyes: Negative  Respiratory: Negative  Cardiovascular: Negative  Gastrointestinal: Negative  Endocrine: Negative  Genitourinary: Positive for frequency and urgency  Leakage   Musculoskeletal: Negative for back pain  Skin: Negative  Allergic/Immunologic: Negative  Neurological: Positive for weakness (bilateral legs) and headaches  Negative for dizziness, seizures, syncope, light-headedness and numbness  Hematological: Negative  Psychiatric/Behavioral: Negative  Family History    Family History   Problem Relation Age of Onset    Lung cancer Mother     Pulmonary embolism Father     Stroke Maternal Grandmother     Heart attack Maternal Grandfather     No Known Problems Paternal Grandmother     No Known Problems Paternal Grandfather     Diabetes Family         Diabetes mellitus    Hypertension Family     Stroke Family         Stroke complications       Social History    Social History     Social History    Marital status:       Spouse name: N/A    Number of children: N/A    Years of education: N/A     Occupational History    Retired      Social History Main Topics    Smoking status: Former Smoker     Quit date: 1/1/1970    Smokeless tobacco: Never Used      Comment: Denied history of current ever day smoker, Former smoker and Never smoker all documented in [employer]    Alcohol use No      Comment: Denied history of alcohol use    Drug use: No      Comment: Denied history of drug use    Sexual activity: Not Currently     Other Topics Concern    Not on file     Social History Narrative    Marital History - Currently  per [employer]           Past Medical History    Past Medical History:   Diagnosis Date    Acid reflux     Anxiety     Arthritis     Asthma     Chronic narcotic dependence (HCC)     Chronic pain     Colon polyp     Cystocele     Diabetes mellitus ([facility] [address] [facility]     Diverticulosis     Dysfunctional uterine bleeding     last assessed - [id number]    Fibromyalgia     Gastric ulcer     Gastroparesis     History of colonic polyps     last assessed - [id number]    History of gastroesophageal reflux (GERD)     Hypercholesterolemia     Hyperlipidemia     Hypertension     IBS (irritable bowel syndrome)     Kidney stone     Post laminectomy syndrome     Seasonal allergies     Spinal stenosis        Surgical History    Past Surgical History:   Procedure Laterality Date    APPENDECTOMY      BACK SURGERY      CHOLECYSTECTOMY      ESOPHAGOGASTRODUODENOSCOPY N/A 9/28/2016    Procedure: ESOPHAGOGASTRODUODENOSCOPY (EGD); Surgeon: Stanislav Navarro MD;  [facility]; Service:     HERNIA REPAIR      HYSTERECTOMY      TTAH-BSO last assessed - [id number]    LAMINECTOMY      LUMBAR LAMINECTOMY      ND ARTHRODESIS POSTERIOR/POSTEROLATERAL THORACIC N/A 6/4/2018    Procedure: Reopening of lumbar incision for T12-L5 posterior instrumented fixation and fusion and T12-L4 posterior decompression;  Surgeon: Makayla Arciniega MD;  [facility];  Service: Neurosurgery    ND COLONOSCOPY FLX DX W/COLLJ SPEC WHEN PFRMD N/A 3/2/2016    Procedure: EGD AND COLONOSCOPY;  Surgeon: Stanislav Navarro MD;  [facility]; Service: Gastroenterology    ND DILATE ESOPHAGUS N/A 9/28/2016    Procedure: DILATATION ESOPHAGEAL;  Surgeon: Stanislav Navarro MD;  [facility];   Service: Gastroenterology    ND ESOPHAGOGASTRODUODENOSCOPY TRANSORAL DIAGNOSTIC N/A 7/18/2016    Procedure: ESOPHAGOGASTRODUODENOSCOPY (EGD); Surgeon: Hany May MD;  [facility];   Service: Gastroenterology    HI IMPLANT SPINAL NEUROSTIM/ Left 6/4/2018    Procedure: removal of left buttock implantable pulse generator and placement of new  implantable pulse generator;  Surgeon: Robina Ba MD;  [facility];  Service: Neurosurgery       Medications      Current Outpatient Prescriptions:     acetaminophen (TYLENOL) 325 mg tablet, Take 3 tablets (975 mg total) by mouth every 8 (eight) hours as needed for mild pain, Disp: 30 tablet, Rfl: 0    Azilsartan Medoxomil (EDARBI) 40 MG TABS, Take 40 mg by mouth daily  , Disp: , Rfl:     docusate sodium (COLACE) 100 mg capsule, Take 1 capsule (100 mg total) by mouth 2 (two) times a day, Disp: 30 capsule, Rfl: 0    fentaNYL (DURAGESIC) 50 mcg/hr, Place 1 patch on the skin every 3 (three) days Max Daily Amount: 1 patch, Disp: 10 patch, Rfl: 0    gabapentin (NEURONTIN) 300 mg capsule, Take 1 capsule (300 mg total) by mouth 2 (two) times a day for 30 days, Disp: 60 capsule, Rfl: 3    IRON PO, Take 65 mg by mouth 2 (two) times a day  , Disp: , Rfl:     levothyroxine 25 mcg tablet, Take 25 mcg by mouth daily, Disp: , Rfl:     loratadine (CLARITIN) 10 mg tablet, Take 10 mg by mouth daily as needed for allergies, Disp: , Rfl:     Lutein 10 MG TABS, Take 10 mg by mouth daily  , Disp: , Rfl:     meclizine (ANTIVERT) 25 mg tablet, Take 1 tablet (25 mg total) by mouth every 8 (eight) hours, Disp: 30 tablet, Rfl: 3    metFORMIN (GLUCOPHAGE) 1000 MG tablet, Take 1,000 mg by mouth 2 (two) times a day with meals, Disp: , Rfl:     methocarbamol (ROBAXIN) 750 mg tablet, Take 1 tablet (750 mg total) by mouth every 6 (six) hours, Disp: 60 tablet, Rfl: 3    Milnacipran HCl (SAVELLA) 100 MG TABS, Take 1 tablet (100 mg total) by mouth daily, Disp: 90 tablet, Rfl: 3    oxyCODONE (ROXICODONE) 15 mg immediate release tablet, Take 1 tablet (15 mg total) by mouth every 6 (six) hours as needed for severe pain for up to 30 days Max Daily Amount: 60 mg, Disp: 120 tablet, Rfl: 0    pantoprazole (PROTONIX) 40 mg tablet, Take 1 tablet (40 mg total) by mouth 2 (two) times daily after meals for 30 days, Disp: 60 tablet, Rfl: 3    pitavastatin (LIVALO) 2 mg, Take 2 mg by mouth daily with dinner  , Disp: , Rfl:     dicyclomine (BENTYL) 10 mg capsule, Take 1 capsule (10 mg total) by mouth 3 (three) times a day as needed (colon spasms) for up to 30 days, Disp: 90 capsule, Rfl: 2    guaiFENesin 400 mg, Take 400 mg by mouth daily as needed for cough, Disp: , Rfl:     latanoprost (XALATAN) 0 005 % ophthalmic solution, ADMINISTER 1 DROP TO BOTH EYES DAILY AT BEDTIME FOR 30 DAYS, Disp: 2 5 mL, Rfl: 0    oxybutynin (DITROPAN XL) 15 MG 24 hr tablet, Take 1 tablet (15 mg total) by mouth daily for 30 days, Disp: 30 tablet, Rfl: 3    polyethylene glycol (MIRALAX) 17 g packet, Take 17 g by mouth daily, Disp: 14 each, Rfl: 0    promethazine-dextromethorphan (PHENERGAN-DM) 6 25-15 mg/5 mL oral syrup, Take 5 mL by mouth 4 (four) times a day as needed for cough, Disp: 180 mL, Rfl: 0    pyridoxine (VITAMIN B6) 100 mg tablet, Take 100 mg by mouth daily, Disp: , Rfl:     Allergies    Allergies   Allergen Reactions    Penicillins Anaphylaxis     Other reaction(s): Unknown Reaction    Sulfa Antibiotics Anaphylaxis     Other reaction(s): Unknown Reaction    Aspartame Other (See Comments) and Hypertension     Slurred speech, weakness, stroke sx    Iodinated Diagnostic Agents Hives     Pt has taken prep prior for contrast and has not had any break through reaction    Other Hives     Other reaction(s): Unknown Reaction  Contrast dye         The following portions of the patient's history were reviewed and updated as appropriate: allergies, current medications, past family history, past medical history, past social history, past surgical history and problem list     Investigations    I personally reviewed the XRAY results with the patient:    Upright plain film of the lumbar spine dated July 7th, 2018  Comparison to previous imaging  Stable appearance of T12-L5 instrumentation  No evidence of hardware loosening or failure  Overall stable thoracolumbar alignment  Stable degenerative changes  Physical Exam    Vitals:  Blood pressure 143/82, pulse 101, temperature (!) 97 °F (36 1 °C), resp  rate 20, height 5' (1 524 m), weight 78 9 kg (174 lb)  ,Body mass index is 33 98 kg/m²  Physical Exam   Constitutional: She appears well-developed and well-nourished  Musculoskeletal:        Right lower leg: Normal         Left lower leg: Normal    Neurological:   Normal gait with a cane  Posture improved while ambulating compared to preop  Skin:   Thoracic and lumbar incisions and left buttock incision well healed  Psychiatric: She has a normal mood and affect  Her behavior is normal  Judgment and thought content normal    Vitals reviewed      Neurologic Exam

## 2018-07-26 DIAGNOSIS — R42 VERTIGO: ICD-10-CM

## 2018-07-26 DIAGNOSIS — M48.062 LUMBAR STENOSIS WITH NEUROGENIC CLAUDICATION: ICD-10-CM

## 2018-07-26 RX ORDER — MECLIZINE HYDROCHLORIDE 25 MG/1
25 TABLET ORAL 2 TIMES DAILY
Qty: 60 TABLET | Refills: 3 | Status: SHIPPED | OUTPATIENT
Start: 2018-07-26 | End: 2019-06-07 | Stop reason: SDUPTHER

## 2018-07-26 RX ORDER — GABAPENTIN 300 MG/1
300 CAPSULE ORAL 2 TIMES DAILY
Qty: 60 CAPSULE | Refills: 3 | Status: SHIPPED | OUTPATIENT
Start: 2018-07-26 | End: 2019-05-28 | Stop reason: SDUPTHER

## 2018-07-26 RX ORDER — OXYCODONE HYDROCHLORIDE 15 MG/1
15 TABLET ORAL EVERY 6 HOURS PRN
Qty: 120 TABLET | Refills: 0 | Status: SHIPPED | OUTPATIENT
Start: 2018-07-26 | End: 2018-08-25

## 2018-07-30 DIAGNOSIS — E03.9 HYPOTHYROIDISM, UNSPECIFIED TYPE: ICD-10-CM

## 2018-07-30 DIAGNOSIS — R53.83 OTHER FATIGUE: ICD-10-CM

## 2018-07-30 DIAGNOSIS — E55.9 VITAMIN D DEFICIENCY: Primary | ICD-10-CM

## 2018-07-30 DIAGNOSIS — D50.9 IRON DEFICIENCY ANEMIA, UNSPECIFIED IRON DEFICIENCY ANEMIA TYPE: ICD-10-CM

## 2018-07-31 ENCOUNTER — APPOINTMENT (OUTPATIENT)
Dept: LAB | Facility: CLINIC | Age: 73
End: 2018-07-31
Payer: COMMERCIAL

## 2018-07-31 ENCOUNTER — TRANSCRIBE ORDERS (OUTPATIENT)
Dept: LAB | Facility: CLINIC | Age: 73
End: 2018-07-31

## 2018-07-31 DIAGNOSIS — E03.9 HYPOTHYROIDISM, UNSPECIFIED TYPE: ICD-10-CM

## 2018-07-31 DIAGNOSIS — R53.83 OTHER FATIGUE: ICD-10-CM

## 2018-07-31 DIAGNOSIS — E55.9 VITAMIN D DEFICIENCY: ICD-10-CM

## 2018-07-31 DIAGNOSIS — D50.9 IRON DEFICIENCY ANEMIA, UNSPECIFIED IRON DEFICIENCY ANEMIA TYPE: Primary | ICD-10-CM

## 2018-07-31 DIAGNOSIS — D50.9 IRON DEFICIENCY ANEMIA, UNSPECIFIED IRON DEFICIENCY ANEMIA TYPE: ICD-10-CM

## 2018-07-31 LAB
25(OH)D3 SERPL-MCNC: 8 NG/ML (ref 30–100)
BASOPHILS # BLD AUTO: 0.04 THOUSANDS/ΜL (ref 0–0.1)
BASOPHILS NFR BLD AUTO: 1 % (ref 0–1)
EOSINOPHIL # BLD AUTO: 0.3 THOUSAND/ΜL (ref 0–0.61)
EOSINOPHIL NFR BLD AUTO: 5 % (ref 0–6)
ERYTHROCYTE [DISTWIDTH] IN BLOOD BY AUTOMATED COUNT: 18.5 % (ref 11.6–15.1)
FERRITIN SERPL-MCNC: 21 NG/ML (ref 8–388)
HCT VFR BLD AUTO: 32.2 % (ref 34.8–46.1)
HGB BLD-MCNC: 9.9 G/DL (ref 11.5–15.4)
IMM GRANULOCYTES # BLD AUTO: 0.01 THOUSAND/UL (ref 0–0.2)
IMM GRANULOCYTES NFR BLD AUTO: 0 % (ref 0–2)
IRON SERPL-MCNC: 36 UG/DL (ref 50–170)
LYMPHOCYTES # BLD AUTO: 1.45 THOUSANDS/ΜL (ref 0.6–4.47)
LYMPHOCYTES NFR BLD AUTO: 25 % (ref 14–44)
MCH RBC QN AUTO: 25.8 PG (ref 26.8–34.3)
MCHC RBC AUTO-ENTMCNC: 30.7 G/DL (ref 31.4–37.4)
MCV RBC AUTO: 84 FL (ref 82–98)
MONOCYTES # BLD AUTO: 0.57 THOUSAND/ΜL (ref 0.17–1.22)
MONOCYTES NFR BLD AUTO: 10 % (ref 4–12)
NEUTROPHILS # BLD AUTO: 3.42 THOUSANDS/ΜL (ref 1.85–7.62)
NEUTS SEG NFR BLD AUTO: 59 % (ref 43–75)
NRBC BLD AUTO-RTO: 0 /100 WBCS
PLATELET # BLD AUTO: 311 THOUSANDS/UL (ref 149–390)
PMV BLD AUTO: 9.4 FL (ref 8.9–12.7)
RBC # BLD AUTO: 3.84 MILLION/UL (ref 3.81–5.12)
TIBC SERPL-MCNC: 316 UG/DL (ref 250–450)
TSH SERPL DL<=0.05 MIU/L-ACNC: 3.15 UIU/ML (ref 0.36–3.74)
WBC # BLD AUTO: 5.79 THOUSAND/UL (ref 4.31–10.16)

## 2018-07-31 PROCEDURE — 83550 IRON BINDING TEST: CPT

## 2018-07-31 PROCEDURE — 83540 ASSAY OF IRON: CPT

## 2018-07-31 PROCEDURE — 82728 ASSAY OF FERRITIN: CPT

## 2018-07-31 PROCEDURE — 36415 COLL VENOUS BLD VENIPUNCTURE: CPT

## 2018-07-31 PROCEDURE — 82306 VITAMIN D 25 HYDROXY: CPT

## 2018-07-31 PROCEDURE — 85025 COMPLETE CBC W/AUTO DIFF WBC: CPT

## 2018-07-31 PROCEDURE — 84443 ASSAY THYROID STIM HORMONE: CPT

## 2018-08-01 DIAGNOSIS — E55.9 VITAMIN D DEFICIENCY: ICD-10-CM

## 2018-08-01 DIAGNOSIS — D50.9 IRON DEFICIENCY ANEMIA, UNSPECIFIED IRON DEFICIENCY ANEMIA TYPE: Primary | ICD-10-CM

## 2018-08-06 DIAGNOSIS — H40.9 GLAUCOMA OF BOTH EYES, UNSPECIFIED GLAUCOMA TYPE: ICD-10-CM

## 2018-08-06 DIAGNOSIS — F32.A DEPRESSION, UNSPECIFIED DEPRESSION TYPE: Primary | ICD-10-CM

## 2018-08-06 RX ORDER — BUPROPION HYDROCHLORIDE 150 MG/1
150 TABLET ORAL DAILY
Qty: 30 TABLET | Refills: 3 | Status: SHIPPED | OUTPATIENT
Start: 2018-08-06 | End: 2020-09-21 | Stop reason: ALTCHOICE

## 2018-08-06 RX ORDER — LATANOPROST 50 UG/ML
1 SOLUTION/ DROPS OPHTHALMIC
Qty: 2.5 ML | Refills: 3 | Status: SHIPPED | OUTPATIENT
Start: 2018-08-06 | End: 2020-01-27 | Stop reason: SDUPTHER

## 2018-08-14 DIAGNOSIS — N39.0 URINARY TRACT INFECTION WITHOUT HEMATURIA, SITE UNSPECIFIED: Primary | ICD-10-CM

## 2018-08-14 RX ORDER — LEVOFLOXACIN 500 MG/1
500 TABLET, FILM COATED ORAL EVERY 24 HOURS
Qty: 10 TABLET | Refills: 0 | Status: SHIPPED | OUTPATIENT
Start: 2018-08-14 | End: 2018-08-24

## 2018-08-29 DIAGNOSIS — M48.062 SPINAL STENOSIS OF LUMBAR REGION WITH NEUROGENIC CLAUDICATION: Primary | ICD-10-CM

## 2018-08-29 RX ORDER — OXYCODONE HYDROCHLORIDE 15 MG/1
15 TABLET ORAL EVERY 4 HOURS PRN
Qty: 120 TABLET | Refills: 0 | Status: SHIPPED | OUTPATIENT
Start: 2018-08-29 | End: 2018-10-01 | Stop reason: SDUPTHER

## 2018-08-29 RX ORDER — METAXALONE 800 MG/1
800 TABLET ORAL 4 TIMES DAILY PRN
Qty: 120 TABLET | Refills: 1 | Status: SHIPPED | OUTPATIENT
Start: 2018-08-29 | End: 2018-11-19 | Stop reason: SDUPTHER

## 2018-09-24 DIAGNOSIS — R19.7 DIARRHEA, UNSPECIFIED TYPE: Primary | ICD-10-CM

## 2018-09-24 RX ORDER — METRONIDAZOLE 500 MG/1
500 TABLET ORAL EVERY 8 HOURS SCHEDULED
Qty: 30 TABLET | Refills: 0 | Status: SHIPPED | OUTPATIENT
Start: 2018-09-24 | End: 2018-09-26 | Stop reason: SDUPTHER

## 2018-09-26 DIAGNOSIS — R19.7 DIARRHEA, UNSPECIFIED TYPE: ICD-10-CM

## 2018-09-26 RX ORDER — METRONIDAZOLE 500 MG/1
500 TABLET ORAL EVERY 8 HOURS SCHEDULED
Qty: 30 TABLET | Refills: 0 | Status: SHIPPED | OUTPATIENT
Start: 2018-09-26 | End: 2018-10-06

## 2018-09-28 DIAGNOSIS — N39.0 URINARY TRACT INFECTION WITHOUT HEMATURIA, SITE UNSPECIFIED: Primary | ICD-10-CM

## 2018-09-28 RX ORDER — LEVOFLOXACIN 250 MG/1
250 TABLET ORAL DAILY
Qty: 10 TABLET | Refills: 1 | Status: SHIPPED | OUTPATIENT
Start: 2018-09-28 | End: 2018-10-08

## 2018-10-01 DIAGNOSIS — M48.062 SPINAL STENOSIS OF LUMBAR REGION WITH NEUROGENIC CLAUDICATION: ICD-10-CM

## 2018-10-01 RX ORDER — OXYCODONE HYDROCHLORIDE 15 MG/1
15 TABLET ORAL EVERY 4 HOURS PRN
Qty: 120 TABLET | Refills: 0 | Status: SHIPPED | OUTPATIENT
Start: 2018-10-01 | End: 2018-11-19 | Stop reason: SDUPTHER

## 2018-10-25 ENCOUNTER — CLINICAL SUPPORT (OUTPATIENT)
Dept: FAMILY MEDICINE CLINIC | Facility: CLINIC | Age: 73
End: 2018-10-25
Payer: COMMERCIAL

## 2018-10-25 DIAGNOSIS — E13.9 DIABETES 1.5, MANAGED AS TYPE 2 (HCC): ICD-10-CM

## 2018-10-25 DIAGNOSIS — N39.0 URINARY TRACT INFECTION WITHOUT HEMATURIA, SITE UNSPECIFIED: Primary | ICD-10-CM

## 2018-10-25 DIAGNOSIS — Z23 NEED FOR INFLUENZA VACCINATION: Primary | ICD-10-CM

## 2018-10-25 PROCEDURE — 90662 IIV NO PRSV INCREASED AG IM: CPT

## 2018-10-25 PROCEDURE — G0008 ADMIN INFLUENZA VIRUS VAC: HCPCS

## 2018-10-25 RX ORDER — DOXYCYCLINE HYCLATE 100 MG/1
100 CAPSULE ORAL EVERY 12 HOURS SCHEDULED
Qty: 20 CAPSULE | Refills: 0 | Status: SHIPPED | OUTPATIENT
Start: 2018-10-25 | End: 2018-11-04

## 2018-11-12 DIAGNOSIS — N39.0 URINARY TRACT INFECTION WITHOUT HEMATURIA, SITE UNSPECIFIED: ICD-10-CM

## 2018-11-12 DIAGNOSIS — F41.9 ANXIETY: Primary | ICD-10-CM

## 2018-11-12 DIAGNOSIS — K21.9 GASTROESOPHAGEAL REFLUX DISEASE WITHOUT ESOPHAGITIS: ICD-10-CM

## 2018-11-12 RX ORDER — PANTOPRAZOLE SODIUM 40 MG/1
40 TABLET, DELAYED RELEASE ORAL
Qty: 60 TABLET | Refills: 0 | Status: SHIPPED | OUTPATIENT
Start: 2018-11-12 | End: 2019-01-05 | Stop reason: SDUPTHER

## 2018-11-12 RX ORDER — ALPRAZOLAM 0.25 MG/1
0.25 TABLET ORAL 3 TIMES DAILY PRN
Qty: 60 TABLET | Refills: 0 | Status: SHIPPED | OUTPATIENT
Start: 2018-11-12 | End: 2019-02-04 | Stop reason: SDUPTHER

## 2018-11-12 RX ORDER — DOXYCYCLINE HYCLATE 100 MG/1
100 CAPSULE ORAL EVERY 12 HOURS SCHEDULED
Qty: 20 CAPSULE | Refills: 1 | Status: SHIPPED | OUTPATIENT
Start: 2018-11-12 | End: 2018-11-20 | Stop reason: ALTCHOICE

## 2018-11-19 DIAGNOSIS — M48.062 SPINAL STENOSIS OF LUMBAR REGION WITH NEUROGENIC CLAUDICATION: ICD-10-CM

## 2018-11-19 RX ORDER — METAXALONE 800 MG/1
800 TABLET ORAL 4 TIMES DAILY PRN
Qty: 120 TABLET | Refills: 0 | Status: SHIPPED | OUTPATIENT
Start: 2018-11-19 | End: 2019-05-10 | Stop reason: ALTCHOICE

## 2018-11-19 RX ORDER — BACLOFEN 10 MG/1
10 TABLET ORAL 3 TIMES DAILY
Qty: 90 TABLET | Refills: 3 | Status: SHIPPED | OUTPATIENT
Start: 2018-11-19 | End: 2019-05-10 | Stop reason: ALTCHOICE

## 2018-11-19 RX ORDER — OXYCODONE HYDROCHLORIDE 15 MG/1
15 TABLET ORAL EVERY 4 HOURS PRN
Qty: 120 TABLET | Refills: 0 | Status: SHIPPED | OUTPATIENT
Start: 2018-11-19 | End: 2018-12-21 | Stop reason: SDUPTHER

## 2018-11-20 ENCOUNTER — HOSPITAL ENCOUNTER (OUTPATIENT)
Dept: RADIOLOGY | Facility: HOSPITAL | Age: 73
Discharge: HOME | End: 2018-11-20
Attending: NEUROLOGICAL SURGERY
Payer: COMMERCIAL

## 2018-11-20 ENCOUNTER — OFFICE VISIT (OUTPATIENT)
Dept: NEUROSURGERY | Facility: CLINIC | Age: 73
End: 2018-11-20
Payer: COMMERCIAL

## 2018-11-20 VITALS
HEIGHT: 60 IN | WEIGHT: 168.4 LBS | TEMPERATURE: 95.6 F | RESPIRATION RATE: 18 BRPM | DIASTOLIC BLOOD PRESSURE: 83 MMHG | BODY MASS INDEX: 33.06 KG/M2 | SYSTOLIC BLOOD PRESSURE: 148 MMHG | HEART RATE: 103 BPM

## 2018-11-20 DIAGNOSIS — Z98.1 STATUS POST LUMBAR SPINAL FUSION: ICD-10-CM

## 2018-11-20 DIAGNOSIS — Z96.89 S/P INSERTION OF SPINAL CORD STIMULATOR: ICD-10-CM

## 2018-11-20 DIAGNOSIS — Z98.1 STATUS POST LUMBAR SPINAL FUSION: Primary | ICD-10-CM

## 2018-11-20 PROCEDURE — 72120 X-RAY BEND ONLY L-S SPINE: CPT

## 2018-11-20 PROCEDURE — 99213 OFFICE O/P EST LOW 20 MIN: CPT | Performed by: NEUROLOGICAL SURGERY

## 2018-11-20 PROCEDURE — 4040F PNEUMOC VAC/ADMIN/RCVD: CPT | Performed by: NEUROLOGICAL SURGERY

## 2018-11-20 RX ORDER — OXYCODONE AND ACETAMINOPHEN 10; 325 MG/1; MG/1
1 TABLET ORAL AS NEEDED
COMMUNITY
End: 2018-12-26 | Stop reason: DRUGHIGH

## 2018-11-20 RX ORDER — METOCLOPRAMIDE 5 MG/1
5 TABLET ORAL 3 TIMES DAILY
COMMUNITY
End: 2019-12-18 | Stop reason: SDUPTHER

## 2018-11-20 NOTE — LETTER
November 20, 2018     Aquilino Philip, [address]  [address]    Patient: eJf Vernon   YOB: 1945   Date of Visit: 11/20/2018       Dear Dr Yousuf Araujo:    Thank you for referring Óscar Jordan to me for evaluation  Below are my notes for this consultation  If you have questions, please do not hesitate to call me  I look forward to following your patient along with you  Sincerely,        Lolly Hackett MD        CC: No Recipients  Lolly Hackett MD  11/20/2018  1:19 PM  Sign at close encounter  Office Note - Neurosurgery   Jef Vernon 74 y o  female MRN: [id number]      Assessment:    Patient is gradually improving  25-year-old woman approximately 5 months post revision lumbar decompression with instrumented fixation fusion  She is doing well clinically with significant improvement in her standing and walking tolerance and overall level of comfort and pain control  Imaging is stable without evidence of hardware loosening or failure  She will be seen again in 6 months time with repeat plain film of the lumbar spine  She will contact my office should any concerns arise in the interim  History, physical examination and diagnostic tests were reviewed and questions answered  Diagnosis, care plan and treatment options were discussed  The patient understand instructions and will follow up as directed  Plan:    Follow-up:   6 months    Problem List Items Addressed This Visit        Other    Status post lumbar spinal fusion - Primary          Subjective/Objective     Chief Complaint    Back spasms  HPI    25-year-old woman approximately 5 months post revision lumbar decompression and fusion  She is doing well clinically with significant improvement in standing and walking tolerance  She no longer uses a walker at all  Aside from her baseline stress urinary incontinence she denies any change in bowel bladder function    She is in the process of moving and notes some muscle spasms in her lower back after day of packing boxes  However on the whole she is much more active than she was prior to surgery and pleased with her quality of life and level of function  ROS    Review of Systems   HENT: Negative  Eyes: Negative  Respiratory: Negative  Cardiovascular: Negative  Gastrointestinal: Negative  Endocrine: Negative  Genitourinary: Positive for frequency and urgency  Leakage     Musculoskeletal: Positive for back pain (across lower back )  Skin: Negative  Allergic/Immunologic: Negative  Neurological: Positive for weakness (bilateral legs ) and headaches  Negative for dizziness, seizures, syncope and numbness  Hematological: Negative  Psychiatric/Behavioral: Negative  Family History    Family History   Problem Relation Age of Onset    Lung cancer Mother     Pulmonary embolism Father     Stroke Maternal Grandmother     Heart attack Maternal Grandfather     No Known Problems Paternal Grandmother     No Known Problems Paternal Grandfather     Diabetes Family         Diabetes mellitus    Hypertension Family     Stroke Family         Stroke complications       Social History    Social History     Social History    Marital status:       Spouse name: N/A    Number of children: N/A    Years of education: N/A     Occupational History    Retired      Social History Main Topics    Smoking status: Former Smoker     Quit date: 1/1/1970    Smokeless tobacco: Never Used      Comment: Denied history of current ever day smoker, Former smoker and Never smoker all documented in [employer]    Alcohol use No      Comment: Denied history of alcohol use    Drug use: No      Comment: Denied history of drug use    Sexual activity: Not Currently     Other Topics Concern    Not on file     Social History Narrative    Marital History - Currently  per [employer]           Past Medical History    Past Medical History:   Diagnosis Date    Acid reflux     Anxiety     Arthritis     Asthma     Chronic narcotic dependence (HCC)     Chronic pain     Colon polyp     Cystocele     Diabetes mellitus ([facility] [address] )     Diverticulosis     Dysfunctional uterine bleeding     last assessed - [id number]    Fibromyalgia     Gastric ulcer     Gastroparesis     History of colonic polyps     last assessed - [id number]    History of gastroesophageal reflux (GERD)     Hypercholesterolemia     Hyperlipidemia     Hypertension     IBS (irritable bowel syndrome)     Kidney stone     Post laminectomy syndrome     Seasonal allergies     Spinal stenosis        Surgical History    Past Surgical History:   Procedure Laterality Date    APPENDECTOMY      BACK SURGERY      CHOLECYSTECTOMY      ESOPHAGOGASTRODUODENOSCOPY N/A 9/28/2016    Procedure: ESOPHAGOGASTRODUODENOSCOPY (EGD); Surgeon: Jennifer Ramriez MD;  [facility]; Service:     HERNIA REPAIR      HYSTERECTOMY      TTAH-BSO last assessed - [id number]    LAMINECTOMY      LUMBAR LAMINECTOMY      LA ARTHRODESIS POSTERIOR/POSTEROLATERAL THORACIC N/A 6/4/2018    Procedure: Reopening of lumbar incision for T12-L5 posterior instrumented fixation and fusion and T12-L4 posterior decompression;  Surgeon: Belinda Neville MD;  [facility];  Service: Neurosurgery    LA COLONOSCOPY FLX DX W/COLLJ SPEC WHEN PFRMD N/A 3/2/2016    Procedure: EGD AND COLONOSCOPY;  Surgeon: Jennifer Ramirez MD;  [facility]; Service: Gastroenterology    LA DILATE ESOPHAGUS N/A 9/28/2016    Procedure: DILATATION ESOPHAGEAL;  Surgeon: Jennifer Ramirez MD;  [facility]; Service: Gastroenterology    LA ESOPHAGOGASTRODUODENOSCOPY TRANSORAL DIAGNOSTIC N/A 7/18/2016    Procedure: ESOPHAGOGASTRODUODENOSCOPY (EGD); Surgeon: Jennifer Ramirez MD;  [facility];   Service: Gastroenterology    LA IMPLANT SPINAL NEUROSTIM/ Left 6/4/2018    Procedure: removal of left buttock implantable pulse generator and placement of new  implantable pulse generator;  Surgeon: Es Harvey MD;  [facility];  Service: Neurosurgery       Medications      Current Outpatient Prescriptions:     acetaminophen (TYLENOL) 325 mg tablet, Take 3 tablets (975 mg total) by mouth every 8 (eight) hours as needed for mild pain, Disp: 30 tablet, Rfl: 0    Azilsartan Medoxomil (EDARBI) 40 MG TABS, Take 40 mg by mouth daily  , Disp: , Rfl:     fentaNYL (DURAGESIC) 50 mcg/hr, Place 1 patch on the skin every 3 (three) days Max Daily Amount: 1 patch, Disp: 10 patch, Rfl: 0    guaiFENesin 400 mg, Take 400 mg by mouth daily as needed for cough, Disp: , Rfl:     IRON PO, Take 65 mg by mouth 2 (two) times a day  , Disp: , Rfl:     levothyroxine 25 mcg tablet, Take 25 mcg by mouth daily, Disp: , Rfl:     loratadine (CLARITIN) 10 mg tablet, Take 10 mg by mouth daily as needed for allergies, Disp: , Rfl:     Lutein 10 MG TABS, Take 10 mg by mouth daily  , Disp: , Rfl:     metFORMIN (GLUCOPHAGE) 1000 MG tablet, Take 1,000 mg by mouth 2 (two) times a day with meals, Disp: , Rfl:     metoclopramide (REGLAN) 5 mg tablet, Take 5 mg by mouth 3 (three) times a day, Disp: , Rfl:     Milnacipran HCl (SAVELLA) 100 MG TABS, Take 1 tablet (100 mg total) by mouth daily, Disp: 90 tablet, Rfl: 3    oxyCODONE-acetaminophen (PERCOCET)  mg per tablet, Take 1 tablet by mouth as needed for moderate pain, Disp: , Rfl:     pantoprazole (PROTONIX) 40 mg tablet, Take 1 tablet (40 mg total) by mouth 2 (two) times daily after meals for 30 days, Disp: 60 tablet, Rfl: 0    pitavastatin (LIVALO) 2 mg, Take 2 mg by mouth daily with dinner  , Disp: , Rfl:     pyridoxine (VITAMIN B6) 100 mg tablet, Take 100 mg by mouth daily, Disp: , Rfl:     ALPRAZolam (XANAX) 0 25 mg tablet, Take 1 tablet (0 25 mg total) by mouth 3 (three) times a day as needed for anxiety (Patient not taking: Reported on 11/20/2018 ), Disp: 60 tablet, Rfl: 0    baclofen 10 mg tablet, Take 1 tablet (10 mg total) by mouth 3 (three) times a day (Patient not taking: Reported on 11/20/2018 ), Disp: 90 tablet, Rfl: 3    buPROPion (WELLBUTRIN XL) 150 mg 24 hr tablet, Take 1 tablet (150 mg total) by mouth daily for 30 days, Disp: 30 tablet, Rfl: 3    dicyclomine (BENTYL) 10 mg capsule, Take 1 capsule (10 mg total) by mouth 3 (three) times a day as needed (colon spasms) for up to 30 days, Disp: 90 capsule, Rfl: 2    docusate sodium (COLACE) 100 mg capsule, Take 1 capsule (100 mg total) by mouth 2 (two) times a day (Patient not taking: Reported on 11/20/2018 ), Disp: 30 capsule, Rfl: 0    gabapentin (NEURONTIN) 300 mg capsule, Take 1 capsule (300 mg total) by mouth 2 (two) times a day for 30 days, Disp: 60 capsule, Rfl: 3    latanoprost (XALATAN) 0 005 % ophthalmic solution, Administer 1 drop to both eyes daily at bedtime for 30 days, Disp: 2 5 mL, Rfl: 3    meclizine (ANTIVERT) 25 mg tablet, Take 1 tablet (25 mg total) by mouth 2 (two) times a day for 30 days, Disp: 60 tablet, Rfl: 3    metaxalone (SKELAXIN) 800 mg tablet, Take 1 tablet (800 mg total) by mouth 4 (four) times a day as needed for muscle spasms (Patient not taking: Reported on 11/20/2018 ), Disp: 120 tablet, Rfl: 0    oxybutynin (DITROPAN XL) 15 MG 24 hr tablet, Take 1 tablet (15 mg total) by mouth daily for 30 days, Disp: 30 tablet, Rfl: 3    oxyCODONE (ROXICODONE) 15 mg immediate release tablet, Take 1 tablet (15 mg total) by mouth every 4 (four) hours as needed for severe pain Max Daily Amount: 90 mg (Patient not taking: Reported on 11/20/2018 ), Disp: 120 tablet, Rfl: 0    polyethylene glycol (MIRALAX) 17 g packet, Take 17 g by mouth daily (Patient not taking: Reported on 11/20/2018 ), Disp: 14 each, Rfl: 0    Allergies    Allergies   Allergen Reactions    Penicillins Anaphylaxis     Other reaction(s): Unknown Reaction    Sulfa Antibiotics Anaphylaxis     Other reaction(s): Unknown Reaction    Aspartame Other (See Comments) and Hypertension     Slurred speech, weakness, stroke sx    Iodinated Diagnostic Agents Hives     Pt has taken prep prior for contrast and has not had any break through reaction    Other Hives     Other reaction(s): Unknown Reaction  Contrast dye         The following portions of the patient's history were reviewed and updated as appropriate: allergies, current medications, past family history, past medical history, past social history, past surgical history and problem list     Investigations    I personally reviewed the XRAY results with the patient:    Upright flexion-extension film lumbar spine dated November 20th, 2018  Comparison to previous imaging  Stable appearance of T12-L5 fixation but evidence of hardware loosening or failure or instability on flexion-extension  Stable degenerative changes throughout the lumbar spine  Note is made of thoracic spinal cord stimulator electrode and left buttock IPG  Physical Exam    Vitals:  Blood pressure 148/83, pulse 103, temperature (!) 95 6 °F (35 3 °C), resp  rate 18, height 5' (1 524 m), weight 76 4 kg (168 lb 6 4 oz)  ,Body mass index is 32 89 kg/m²  Physical Exam   Constitutional: She is oriented to person, place, and time  She appears well-developed and well-nourished  No distress  Musculoskeletal:   Some restriction range of motion on flexion-extension, the patient does not report pain with range of motion  Neurological: She is alert and oriented to person, place, and time  Walks with a mildly wide-based but steady gait  Much improved posture  Skin: Skin is warm and dry  Psychiatric: She has a normal mood and affect  Her behavior is normal    Vitals reviewed  Neurologic Exam     Mental Status   Oriented to person, place, and time

## 2018-11-20 NOTE — PROGRESS NOTES
Office Note - Neurosurgery   Herman Viramontes 74 y o  female MRN: [id number]      Assessment:    Patient is gradually improving  18-year-old woman approximately 5 months post revision lumbar decompression with instrumented fixation fusion  She is doing well clinically with significant improvement in her standing and walking tolerance and overall level of comfort and pain control  Imaging is stable without evidence of hardware loosening or failure  She will be seen again in 6 months time with repeat plain film of the lumbar spine  She will contact my office should any concerns arise in the interim  History, physical examination and diagnostic tests were reviewed and questions answered  Diagnosis, care plan and treatment options were discussed  The patient understand instructions and will follow up as directed  Plan:    Follow-up:   6 months    Problem List Items Addressed This Visit        Other    Status post lumbar spinal fusion - Primary          Subjective/Objective     Chief Complaint    Back spasms  HPI    18-year-old woman approximately 5 months post revision lumbar decompression and fusion  She is doing well clinically with significant improvement in standing and walking tolerance  She no longer uses a walker at all  Aside from her baseline stress urinary incontinence she denies any change in bowel bladder function  She is in the process of moving and notes some muscle spasms in her lower back after day of packing boxes  However on the whole she is much more active than she was prior to surgery and pleased with her quality of life and level of function  ROS    Review of Systems   HENT: Negative  Eyes: Negative  Respiratory: Negative  Cardiovascular: Negative  Gastrointestinal: Negative  Endocrine: Negative  Genitourinary: Positive for frequency and urgency  Leakage     Musculoskeletal: Positive for back pain (across lower back )     Skin: Negative  Allergic/Immunologic: Negative  Neurological: Positive for weakness (bilateral legs ) and headaches  Negative for dizziness, seizures, syncope and numbness  Hematological: Negative  Psychiatric/Behavioral: Negative  Family History    Family History   Problem Relation Age of Onset    Lung cancer Mother     Pulmonary embolism Father     Stroke Maternal Grandmother     Heart attack Maternal Grandfather     No Known Problems Paternal Grandmother     No Known Problems Paternal Grandfather     Diabetes Family         Diabetes mellitus    Hypertension Family     Stroke Family         Stroke complications       Social History    Social History     Social History    Marital status:       Spouse name: N/A    Number of children: N/A    Years of education: N/A     Occupational History    Retired      Social History Main Topics    Smoking status: Former Smoker     Quit date: 1/1/1970    Smokeless tobacco: Never Used      Comment: Denied history of current ever day smoker, Former smoker and Never smoker all documented in [employer]    Alcohol use No      Comment: Denied history of alcohol use    Drug use: No      Comment: Denied history of drug use    Sexual activity: Not Currently     Other Topics Concern    Not on file     Social History Narrative    Marital History - Currently  per [employer]           Past Medical History    Past Medical History:   Diagnosis Date    Acid reflux     Anxiety     Arthritis     Asthma     [facility] narcotic dependence ([facility] )     Chronic pain     Colon polyp     Cystocele     Diabetes mellitus ([facility]     Diverticulosis     Dysfunctional uterine bleeding     last assessed - [id number]    Fibromyalgia     Gastric ulcer     Gastroparesis     History of colonic polyps     last assessed - [id number]    History of gastroesophageal reflux (GERD)     Hypercholesterolemia     Hyperlipidemia     Hypertension     IBS (irritable bowel syndrome)     Kidney stone     Post laminectomy syndrome     Seasonal allergies     Spinal stenosis        Surgical History    Past Surgical History:   Procedure Laterality Date    APPENDECTOMY      BACK SURGERY      CHOLECYSTECTOMY      ESOPHAGOGASTRODUODENOSCOPY N/A 9/28/2016    Procedure: ESOPHAGOGASTRODUODENOSCOPY (EGD); Surgeon: Chung Miramontes MD;  [facility]; Service:     HERNIA REPAIR      HYSTERECTOMY      TTA-BSO last assessed - [id number]    LAMINECTOMY      LUMBAR LAMINECTOMY      IN ARTHRODESIS POSTERIOR/POSTEROLATERAL THORACIC N/A 6/4/2018    Procedure: Reopening of lumbar incision for T12-L5 posterior instrumented fixation and fusion and T12-L4 posterior decompression;  Surgeon: Danielle Miller MD;  [facility];  Service: Neurosurgery    IN COLONOSCOPY FLX DX W/COLLJ SPEC WHEN PFRMD N/A 3/2/2016    Procedure: EGD AND COLONOSCOPY;  Surgeon: Chung Miramontes MD;  [facility]; Service: Gastroenterology    IN DILATE ESOPHAGUS N/A 9/28/2016    Procedure: DILATATION ESOPHAGEAL;  Surgeon: Chung Miramontes MD;  [facility]; Service: Gastroenterology    IN ESOPHAGOGASTRODUODENOSCOPY TRANSORAL DIAGNOSTIC N/A 7/18/2016    Procedure: ESOPHAGOGASTRODUODENOSCOPY (EGD); Surgeon: Chung Miramontes MD;  [facility];   Service: Gastroenterology    IN IMPLANT SPINAL NEUROSTIM/ Left 6/4/2018    Procedure: removal of left buttock implantable pulse generator and placement of new  implantable pulse generator;  Surgeon: Danielle Miller MD;  [facility];  Service: Neurosurgery       Medications      Current Outpatient Prescriptions:     acetaminophen (TYLENOL) 325 mg tablet, Take 3 tablets (975 mg total) by mouth every 8 (eight) hours as needed for mild pain, Disp: 30 tablet, Rfl: 0    Azilsartan Medoxomil (EDARBI) 40 MG TABS, Take 40 mg by mouth daily  , Disp: , Rfl:     fentaNYL (DURAGESIC) 50 mcg/hr, Place 1 patch on the skin every 3 (three) days Max Daily Amount: 1 patch, Disp: 10 patch, Rfl: 0    guaiFENesin 400 mg, Take 400 mg by mouth daily as needed for cough, Disp: , Rfl:     IRON PO, Take 65 mg by mouth 2 (two) times a day  , Disp: , Rfl:     levothyroxine 25 mcg tablet, Take 25 mcg by mouth daily, Disp: , Rfl:     loratadine (CLARITIN) 10 mg tablet, Take 10 mg by mouth daily as needed for allergies, Disp: , Rfl:     Lutein 10 MG TABS, Take 10 mg by mouth daily  , Disp: , Rfl:     metFORMIN (GLUCOPHAGE) 1000 MG tablet, Take 1,000 mg by mouth 2 (two) times a day with meals, Disp: , Rfl:     metoclopramide (REGLAN) 5 mg tablet, Take 5 mg by mouth 3 (three) times a day, Disp: , Rfl:     Milnacipran HCl (SAVELLA) 100 MG TABS, Take 1 tablet (100 mg total) by mouth daily, Disp: 90 tablet, Rfl: 3    oxyCODONE-acetaminophen (PERCOCET)  mg per tablet, Take 1 tablet by mouth as needed for moderate pain, Disp: , Rfl:     pantoprazole (PROTONIX) 40 mg tablet, Take 1 tablet (40 mg total) by mouth 2 (two) times daily after meals for 30 days, Disp: 60 tablet, Rfl: 0    pitavastatin (LIVALO) 2 mg, Take 2 mg by mouth daily with dinner  , Disp: , Rfl:     pyridoxine (VITAMIN B6) 100 mg tablet, Take 100 mg by mouth daily, Disp: , Rfl:     ALPRAZolam (XANAX) 0 25 mg tablet, Take 1 tablet (0 25 mg total) by mouth 3 (three) times a day as needed for anxiety (Patient not taking: Reported on 11/20/2018 ), Disp: 60 tablet, Rfl: 0    baclofen 10 mg tablet, Take 1 tablet (10 mg total) by mouth 3 (three) times a day (Patient not taking: Reported on 11/20/2018 ), Disp: 90 tablet, Rfl: 3    buPROPion (WELLBUTRIN XL) 150 mg 24 hr tablet, Take 1 tablet (150 mg total) by mouth daily for 30 days, Disp: 30 tablet, Rfl: 3    dicyclomine (BENTYL) 10 mg capsule, Take 1 capsule (10 mg total) by mouth 3 (three) times a day as needed (colon spasms) for up to 30 days, Disp: 90 capsule, Rfl: 2    docusate sodium (COLACE) 100 mg capsule, Take 1 capsule (100 mg total) by mouth 2 (two) times a day (Patient not taking: Reported on 11/20/2018 ), Disp: 30 capsule, Rfl: 0    gabapentin (NEURONTIN) 300 mg capsule, Take 1 capsule (300 mg total) by mouth 2 (two) times a day for 30 days, Disp: 60 capsule, Rfl: 3    latanoprost (XALATAN) 0 005 % ophthalmic solution, Administer 1 drop to both eyes daily at bedtime for 30 days, Disp: 2 5 mL, Rfl: 3    meclizine (ANTIVERT) 25 mg tablet, Take 1 tablet (25 mg total) by mouth 2 (two) times a day for 30 days, Disp: 60 tablet, Rfl: 3    metaxalone (SKELAXIN) 800 mg tablet, Take 1 tablet (800 mg total) by mouth 4 (four) times a day as needed for muscle spasms (Patient not taking: Reported on 11/20/2018 ), Disp: 120 tablet, Rfl: 0    oxybutynin (DITROPAN XL) 15 MG 24 hr tablet, Take 1 tablet (15 mg total) by mouth daily for 30 days, Disp: 30 tablet, Rfl: 3    oxyCODONE (ROXICODONE) 15 mg immediate release tablet, Take 1 tablet (15 mg total) by mouth every 4 (four) hours as needed for severe pain Max Daily Amount: 90 mg (Patient not taking: Reported on 11/20/2018 ), Disp: 120 tablet, Rfl: 0    polyethylene glycol (MIRALAX) 17 g packet, Take 17 g by mouth daily (Patient not taking: Reported on 11/20/2018 ), Disp: 14 each, Rfl: 0    Allergies    Allergies   Allergen Reactions    Penicillins Anaphylaxis     Other reaction(s): Unknown Reaction    Sulfa Antibiotics Anaphylaxis     Other reaction(s): Unknown Reaction    Aspartame Other (See Comments) and Hypertension     Slurred speech, weakness, stroke sx    Iodinated Diagnostic Agents Hives     Pt has taken prep prior for contrast and has not had any break through reaction    Other Hives     Other reaction(s): Unknown Reaction  Contrast dye         The following portions of the patient's history were reviewed and updated as appropriate: allergies, current medications, past family history, past medical history, past social history, past surgical history and problem list     Investigations    I personally reviewed the XRAY results with the patient:    Upright flexion-extension film lumbar spine dated November 20th, 2018  Comparison to previous imaging  Stable appearance of T12-L5 fixation but evidence of hardware loosening or failure or instability on flexion-extension  Stable degenerative changes throughout the lumbar spine  Note is made of thoracic spinal cord stimulator electrode and left buttock IPG  Physical Exam    Vitals:  Blood pressure 148/83, pulse 103, temperature (!) 95 6 °F (35 3 °C), resp  rate 18, height 5' (1 524 m), weight 76 4 kg (168 lb 6 4 oz)  ,Body mass index is 32 89 kg/m²  Physical Exam   Constitutional: She is oriented to person, place, and time  She appears well-developed and well-nourished  No distress  Musculoskeletal:   Some restriction range of motion on flexion-extension, the patient does not report pain with range of motion  Neurological: She is alert and oriented to person, place, and time  Walks with a mildly wide-based but steady gait  Much improved posture  Skin: Skin is warm and dry  Psychiatric: She has a normal mood and affect  Her behavior is normal    Vitals reviewed  Neurologic Exam     Mental Status   Oriented to person, place, and time

## 2018-11-25 DIAGNOSIS — K31.84 GASTROPARESIS: ICD-10-CM

## 2018-11-26 RX ORDER — METOCLOPRAMIDE 5 MG/1
5 TABLET ORAL 4 TIMES DAILY
Qty: 120 TABLET | Refills: 0 | Status: SHIPPED | OUTPATIENT
Start: 2018-11-26 | End: 2018-12-26

## 2018-12-05 DIAGNOSIS — J06.9 UPPER RESPIRATORY TRACT INFECTION, UNSPECIFIED TYPE: Primary | ICD-10-CM

## 2018-12-05 RX ORDER — CLARITHROMYCIN 500 MG/1
500 TABLET, COATED ORAL EVERY 12 HOURS SCHEDULED
Qty: 20 TABLET | Refills: 0 | Status: SHIPPED | OUTPATIENT
Start: 2018-12-05 | End: 2018-12-15

## 2018-12-21 DIAGNOSIS — M48.062 SPINAL STENOSIS OF LUMBAR REGION WITH NEUROGENIC CLAUDICATION: ICD-10-CM

## 2018-12-21 RX ORDER — OXYCODONE HYDROCHLORIDE 15 MG/1
15 TABLET ORAL EVERY 4 HOURS PRN
Qty: 120 TABLET | Refills: 0 | Status: SHIPPED | OUTPATIENT
Start: 2018-12-21 | End: 2018-12-26 | Stop reason: DRUGHIGH

## 2018-12-26 DIAGNOSIS — M54.50 LUMBAR BACK PAIN: Primary | ICD-10-CM

## 2018-12-26 RX ORDER — OXYCODONE HYDROCHLORIDE 10 MG/1
10 TABLET ORAL EVERY 6 HOURS PRN
Qty: 120 TABLET | Refills: 0 | Status: SHIPPED | OUTPATIENT
Start: 2018-12-26 | End: 2019-01-25

## 2019-01-05 DIAGNOSIS — K21.9 GASTROESOPHAGEAL REFLUX DISEASE WITHOUT ESOPHAGITIS: ICD-10-CM

## 2019-01-07 DIAGNOSIS — K57.92 ACUTE DIVERTICULITIS: Primary | ICD-10-CM

## 2019-01-07 DIAGNOSIS — K58.2 IRRITABLE BOWEL SYNDROME WITH BOTH CONSTIPATION AND DIARRHEA: ICD-10-CM

## 2019-01-07 RX ORDER — CIPROFLOXACIN 500 MG/1
500 TABLET, FILM COATED ORAL EVERY 12 HOURS SCHEDULED
Qty: 20 TABLET | Refills: 0 | Status: SHIPPED | OUTPATIENT
Start: 2019-01-07 | End: 2019-01-17

## 2019-01-07 RX ORDER — PANTOPRAZOLE SODIUM 40 MG/1
40 TABLET, DELAYED RELEASE ORAL
Qty: 60 TABLET | Refills: 0 | Status: SHIPPED | OUTPATIENT
Start: 2019-01-07 | End: 2019-01-17 | Stop reason: SDUPTHER

## 2019-01-07 RX ORDER — METRONIDAZOLE 500 MG/1
500 TABLET ORAL EVERY 12 HOURS SCHEDULED
Qty: 20 TABLET | Refills: 0 | Status: SHIPPED | OUTPATIENT
Start: 2019-01-07 | End: 2019-01-17

## 2019-01-07 RX ORDER — DICYCLOMINE HYDROCHLORIDE 10 MG/1
10 CAPSULE ORAL 3 TIMES DAILY PRN
Qty: 60 CAPSULE | Refills: 3 | Status: SHIPPED | OUTPATIENT
Start: 2019-01-07 | End: 2020-03-04 | Stop reason: SDUPTHER

## 2019-01-11 DIAGNOSIS — M19.90 OSTEOARTHRITIS, UNSPECIFIED OSTEOARTHRITIS TYPE, UNSPECIFIED SITE: ICD-10-CM

## 2019-01-11 RX ORDER — MELOXICAM 15 MG/1
15 TABLET ORAL DAILY
Qty: 30 TABLET | Refills: 3 | Status: SHIPPED | OUTPATIENT
Start: 2019-01-11 | End: 2019-06-05 | Stop reason: SDUPTHER

## 2019-01-14 DIAGNOSIS — M79.7 FIBROMYALGIA: ICD-10-CM

## 2019-01-14 DIAGNOSIS — E55.9 VITAMIN D DEFICIENCY: ICD-10-CM

## 2019-01-14 DIAGNOSIS — E03.9 HYPOTHYROIDISM, UNSPECIFIED TYPE: ICD-10-CM

## 2019-01-14 DIAGNOSIS — R53.83 OTHER FATIGUE: ICD-10-CM

## 2019-01-14 DIAGNOSIS — E13.9 DIABETES 1.5, MANAGED AS TYPE 2 (HCC): Primary | ICD-10-CM

## 2019-01-17 DIAGNOSIS — K21.9 GASTROESOPHAGEAL REFLUX DISEASE WITHOUT ESOPHAGITIS: ICD-10-CM

## 2019-01-17 RX ORDER — PANTOPRAZOLE SODIUM 40 MG/1
40 TABLET, DELAYED RELEASE ORAL
Qty: 60 TABLET | Refills: 3 | Status: SHIPPED | OUTPATIENT
Start: 2019-01-17 | End: 2019-05-01 | Stop reason: SDUPTHER

## 2019-02-04 DIAGNOSIS — F41.9 ANXIETY: ICD-10-CM

## 2019-02-04 RX ORDER — ALPRAZOLAM 0.25 MG/1
0.25 TABLET ORAL 3 TIMES DAILY PRN
Qty: 90 TABLET | Refills: 1 | Status: SHIPPED | OUTPATIENT
Start: 2019-02-04 | End: 2019-10-02 | Stop reason: SDUPTHER

## 2019-02-18 DIAGNOSIS — M48.061 SPINAL STENOSIS OF LUMBAR REGION, UNSPECIFIED WHETHER NEUROGENIC CLAUDICATION PRESENT: ICD-10-CM

## 2019-02-18 RX ORDER — FENTANYL 25 UG/H
1 PATCH TRANSDERMAL
Qty: 10 PATCH | Refills: 0 | Status: SHIPPED | OUTPATIENT
Start: 2019-02-18 | End: 2019-11-01 | Stop reason: ALTCHOICE

## 2019-02-18 RX ORDER — NITROFURANTOIN MACROCRYSTALS 50 MG/1
50 CAPSULE ORAL
Qty: 30 CAPSULE | Refills: 3 | Status: SHIPPED | OUTPATIENT
Start: 2019-02-18 | End: 2020-04-06 | Stop reason: SDUPTHER

## 2019-02-22 DIAGNOSIS — M54.50 LUMBAR BACK PAIN: Primary | ICD-10-CM

## 2019-02-22 DIAGNOSIS — M96.1 POST LAMINECTOMY SYNDROME: ICD-10-CM

## 2019-02-22 RX ORDER — OXYCODONE AND ACETAMINOPHEN 10; 325 MG/1; MG/1
1 TABLET ORAL EVERY 6 HOURS PRN
Qty: 120 TABLET | Refills: 0 | Status: SHIPPED | OUTPATIENT
Start: 2019-02-22 | End: 2019-04-01 | Stop reason: SDUPTHER

## 2019-02-27 DIAGNOSIS — E13.9 DIABETES 1.5, MANAGED AS TYPE 2 (HCC): Primary | ICD-10-CM

## 2019-03-04 DIAGNOSIS — N32.81 OVERACTIVE BLADDER: ICD-10-CM

## 2019-03-04 RX ORDER — OXYBUTYNIN CHLORIDE 15 MG/1
15 TABLET, EXTENDED RELEASE ORAL DAILY
Qty: 30 TABLET | Refills: 3 | Status: SHIPPED | OUTPATIENT
Start: 2019-03-04 | End: 2019-04-03 | Stop reason: ALTCHOICE

## 2019-03-28 DIAGNOSIS — E13.9 DIABETES 1.5, MANAGED AS TYPE 2 (HCC): Primary | ICD-10-CM

## 2019-04-01 DIAGNOSIS — M96.1 POST LAMINECTOMY SYNDROME: ICD-10-CM

## 2019-04-01 DIAGNOSIS — M54.50 LUMBAR BACK PAIN: Primary | ICD-10-CM

## 2019-04-01 DIAGNOSIS — M54.50 LUMBAR BACK PAIN: ICD-10-CM

## 2019-04-01 RX ORDER — METAXALONE 800 MG/1
800 TABLET ORAL 3 TIMES DAILY
Qty: 90 TABLET | Refills: 3 | Status: SHIPPED | OUTPATIENT
Start: 2019-04-01 | End: 2020-01-10 | Stop reason: ALTCHOICE

## 2019-04-01 RX ORDER — OXYCODONE AND ACETAMINOPHEN 10; 325 MG/1; MG/1
1 TABLET ORAL EVERY 6 HOURS PRN
Qty: 120 TABLET | Refills: 0 | Status: SHIPPED | OUTPATIENT
Start: 2019-04-01 | End: 2019-05-01

## 2019-05-01 DIAGNOSIS — M96.1 POST LAMINECTOMY SYNDROME: ICD-10-CM

## 2019-05-01 DIAGNOSIS — M54.50 LUMBAR BACK PAIN: ICD-10-CM

## 2019-05-01 DIAGNOSIS — K21.9 GASTROESOPHAGEAL REFLUX DISEASE WITHOUT ESOPHAGITIS: ICD-10-CM

## 2019-05-01 RX ORDER — PANTOPRAZOLE SODIUM 40 MG/1
40 TABLET, DELAYED RELEASE ORAL
Qty: 60 TABLET | Refills: 3 | Status: ON HOLD | OUTPATIENT
Start: 2019-05-01 | End: 2020-07-17

## 2019-05-01 RX ORDER — OXYCODONE AND ACETAMINOPHEN 10; 325 MG/1; MG/1
1 TABLET ORAL EVERY 6 HOURS PRN
Qty: 120 TABLET | Refills: 0 | Status: SHIPPED | OUTPATIENT
Start: 2019-05-01 | End: 2019-05-30 | Stop reason: SDUPTHER

## 2019-05-03 ENCOUNTER — TELEPHONE (OUTPATIENT)
Dept: FAMILY MEDICINE CLINIC | Facility: CLINIC | Age: 74
End: 2019-05-03

## 2019-05-09 ENCOUNTER — OFFICE VISIT (OUTPATIENT)
Dept: FAMILY MEDICINE CLINIC | Facility: CLINIC | Age: 74
End: 2019-05-09
Payer: COMMERCIAL

## 2019-05-09 VITALS
HEART RATE: 100 BPM | TEMPERATURE: 97.9 F | SYSTOLIC BLOOD PRESSURE: 126 MMHG | DIASTOLIC BLOOD PRESSURE: 82 MMHG | HEIGHT: 60 IN | WEIGHT: 182.6 LBS | BODY MASS INDEX: 35.85 KG/M2

## 2019-05-09 DIAGNOSIS — Z12.11 SCREENING FOR MALIGNANT NEOPLASM OF COLON: ICD-10-CM

## 2019-05-09 DIAGNOSIS — Z12.39 SCREENING FOR MALIGNANT NEOPLASM OF BREAST: ICD-10-CM

## 2019-05-09 DIAGNOSIS — J01.00 ACUTE NON-RECURRENT MAXILLARY SINUSITIS: ICD-10-CM

## 2019-05-09 DIAGNOSIS — E11.8 TYPE 2 DIABETES MELLITUS WITH COMPLICATION, WITHOUT LONG-TERM CURRENT USE OF INSULIN (HCC): ICD-10-CM

## 2019-05-09 DIAGNOSIS — D50.8 IRON DEFICIENCY ANEMIA SECONDARY TO INADEQUATE DIETARY IRON INTAKE: ICD-10-CM

## 2019-05-09 DIAGNOSIS — Z00.00 MEDICARE ANNUAL WELLNESS VISIT, SUBSEQUENT: Primary | ICD-10-CM

## 2019-05-09 DIAGNOSIS — E55.9 VITAMIN D DEFICIENCY: ICD-10-CM

## 2019-05-09 DIAGNOSIS — I10 ESSENTIAL HYPERTENSION: ICD-10-CM

## 2019-05-09 DIAGNOSIS — E03.9 ACQUIRED HYPOTHYROIDISM: ICD-10-CM

## 2019-05-09 DIAGNOSIS — L98.9 FACIAL LESION: ICD-10-CM

## 2019-05-09 LAB — SL AMB POCT HEMOGLOBIN AIC: 6.8 (ref ?–6.5)

## 2019-05-09 PROCEDURE — 88305 TISSUE EXAM BY PATHOLOGIST: CPT | Performed by: PATHOLOGY

## 2019-05-09 PROCEDURE — 3079F DIAST BP 80-89 MM HG: CPT | Performed by: NURSE PRACTITIONER

## 2019-05-09 PROCEDURE — G0439 PPPS, SUBSEQ VISIT: HCPCS | Performed by: NURSE PRACTITIONER

## 2019-05-09 PROCEDURE — 99213 OFFICE O/P EST LOW 20 MIN: CPT | Performed by: NURSE PRACTITIONER

## 2019-05-09 PROCEDURE — 1125F AMNT PAIN NOTED PAIN PRSNT: CPT | Performed by: NURSE PRACTITIONER

## 2019-05-09 PROCEDURE — 83036 HEMOGLOBIN GLYCOSYLATED A1C: CPT | Performed by: NURSE PRACTITIONER

## 2019-05-09 PROCEDURE — 3074F SYST BP LT 130 MM HG: CPT | Performed by: NURSE PRACTITIONER

## 2019-05-09 PROCEDURE — 1170F FXNL STATUS ASSESSED: CPT | Performed by: NURSE PRACTITIONER

## 2019-05-09 RX ORDER — AZITHROMYCIN 250 MG/1
TABLET, FILM COATED ORAL
Qty: 6 TABLET | Refills: 0 | Status: SHIPPED | OUTPATIENT
Start: 2019-05-09 | End: 2019-05-13

## 2019-05-17 ENCOUNTER — HOSPITAL ENCOUNTER (OUTPATIENT)
Dept: MAMMOGRAPHY | Facility: HOSPITAL | Age: 74
Discharge: HOME | End: 2019-05-17
Payer: COMMERCIAL

## 2019-05-17 ENCOUNTER — APPOINTMENT (OUTPATIENT)
Dept: LAB | Facility: CLINIC | Age: 74
End: 2019-05-17
Payer: COMMERCIAL

## 2019-05-17 VITALS — BODY MASS INDEX: 35.73 KG/M2 | HEIGHT: 60 IN | WEIGHT: 182 LBS

## 2019-05-17 DIAGNOSIS — I10 ESSENTIAL HYPERTENSION: ICD-10-CM

## 2019-05-17 DIAGNOSIS — E55.9 VITAMIN D DEFICIENCY: ICD-10-CM

## 2019-05-17 DIAGNOSIS — E03.9 ACQUIRED HYPOTHYROIDISM: ICD-10-CM

## 2019-05-17 DIAGNOSIS — Z12.39 SCREENING FOR MALIGNANT NEOPLASM OF BREAST: ICD-10-CM

## 2019-05-17 DIAGNOSIS — D50.8 IRON DEFICIENCY ANEMIA SECONDARY TO INADEQUATE DIETARY IRON INTAKE: ICD-10-CM

## 2019-05-17 LAB
25(OH)D3 SERPL-MCNC: 15 NG/ML (ref 30–100)
ALBUMIN SERPL BCP-MCNC: 3.6 G/DL (ref 3.5–5)
ALP SERPL-CCNC: 109 U/L (ref 46–116)
ALT SERPL W P-5'-P-CCNC: 27 U/L (ref 12–78)
ANION GAP SERPL CALCULATED.3IONS-SCNC: 9 MMOL/L (ref 4–13)
AST SERPL W P-5'-P-CCNC: 25 U/L (ref 5–45)
BILIRUB SERPL-MCNC: 0.3 MG/DL (ref 0.2–1)
BUN SERPL-MCNC: 18 MG/DL (ref 5–25)
CALCIUM SERPL-MCNC: 9.4 MG/DL (ref 8.3–10.1)
CHLORIDE SERPL-SCNC: 104 MMOL/L (ref 100–108)
CHOLEST SERPL-MCNC: 180 MG/DL (ref 50–200)
CO2 SERPL-SCNC: 27 MMOL/L (ref 21–32)
CREAT SERPL-MCNC: 1.04 MG/DL (ref 0.6–1.3)
CREAT UR-MCNC: 131 MG/DL
FERRITIN SERPL-MCNC: 9 NG/ML (ref 8–388)
GFR SERPL CREATININE-BSD FRML MDRD: 52 ML/MIN/1.73SQ M
GLUCOSE P FAST SERPL-MCNC: 155 MG/DL (ref 65–99)
HDLC SERPL-MCNC: 54 MG/DL (ref 40–60)
IRON SERPL-MCNC: 40 UG/DL (ref 50–170)
LDLC SERPL CALC-MCNC: 98 MG/DL (ref 0–100)
MICROALBUMIN UR-MCNC: 11.5 MG/L (ref 0–20)
MICROALBUMIN/CREAT 24H UR: 9 MG/G CREATININE (ref 0–30)
NONHDLC SERPL-MCNC: 126 MG/DL
POTASSIUM SERPL-SCNC: 3.9 MMOL/L (ref 3.5–5.3)
PROT SERPL-MCNC: 7 G/DL (ref 6.4–8.2)
SODIUM SERPL-SCNC: 140 MMOL/L (ref 136–145)
TRIGL SERPL-MCNC: 139 MG/DL
TSH SERPL DL<=0.05 MIU/L-ACNC: 3.3 UIU/ML (ref 0.36–3.74)

## 2019-05-17 PROCEDURE — 80053 COMPREHEN METABOLIC PANEL: CPT

## 2019-05-17 PROCEDURE — 77067 SCR MAMMO BI INCL CAD: CPT

## 2019-05-17 PROCEDURE — 82306 VITAMIN D 25 HYDROXY: CPT

## 2019-05-17 PROCEDURE — 82728 ASSAY OF FERRITIN: CPT

## 2019-05-17 PROCEDURE — 77063 BREAST TOMOSYNTHESIS BI: CPT

## 2019-05-17 PROCEDURE — 82570 ASSAY OF URINE CREATININE: CPT | Performed by: NURSE PRACTITIONER

## 2019-05-17 PROCEDURE — 82043 UR ALBUMIN QUANTITATIVE: CPT | Performed by: NURSE PRACTITIONER

## 2019-05-17 PROCEDURE — 84443 ASSAY THYROID STIM HORMONE: CPT

## 2019-05-17 PROCEDURE — 83540 ASSAY OF IRON: CPT

## 2019-05-17 PROCEDURE — 36415 COLL VENOUS BLD VENIPUNCTURE: CPT

## 2019-05-17 PROCEDURE — 80061 LIPID PANEL: CPT

## 2019-05-28 DIAGNOSIS — M48.062 LUMBAR STENOSIS WITH NEUROGENIC CLAUDICATION: ICD-10-CM

## 2019-05-28 RX ORDER — GABAPENTIN 300 MG/1
300 CAPSULE ORAL 2 TIMES DAILY
Qty: 60 CAPSULE | Refills: 3 | Status: SHIPPED | OUTPATIENT
Start: 2019-05-28 | End: 2020-02-03 | Stop reason: SDUPTHER

## 2019-05-30 DIAGNOSIS — G47.00 INSOMNIA, UNSPECIFIED TYPE: Primary | ICD-10-CM

## 2019-05-30 DIAGNOSIS — M96.1 POST LAMINECTOMY SYNDROME: ICD-10-CM

## 2019-05-30 DIAGNOSIS — M54.50 LUMBAR BACK PAIN: ICD-10-CM

## 2019-05-30 RX ORDER — ZOLPIDEM TARTRATE 5 MG/1
5 TABLET ORAL
Qty: 30 TABLET | Refills: 3 | Status: SHIPPED | OUTPATIENT
Start: 2019-05-30 | End: 2020-09-21 | Stop reason: ALTCHOICE

## 2019-05-30 RX ORDER — OXYCODONE AND ACETAMINOPHEN 10; 325 MG/1; MG/1
1 TABLET ORAL EVERY 6 HOURS PRN
Qty: 120 TABLET | Refills: 0 | Status: SHIPPED | OUTPATIENT
Start: 2019-05-30 | End: 2019-07-03 | Stop reason: SDUPTHER

## 2019-06-01 DIAGNOSIS — N39.0 URINARY TRACT INFECTION WITHOUT HEMATURIA, SITE UNSPECIFIED: Primary | ICD-10-CM

## 2019-06-01 RX ORDER — CIPROFLOXACIN 500 MG/1
500 TABLET, FILM COATED ORAL EVERY 12 HOURS SCHEDULED
Qty: 20 TABLET | Refills: 1 | Status: SHIPPED | OUTPATIENT
Start: 2019-06-01 | End: 2019-06-11

## 2019-06-05 DIAGNOSIS — M19.90 OSTEOARTHRITIS, UNSPECIFIED OSTEOARTHRITIS TYPE, UNSPECIFIED SITE: ICD-10-CM

## 2019-06-05 RX ORDER — MELOXICAM 15 MG/1
15 TABLET ORAL DAILY
Qty: 30 TABLET | Refills: 3 | Status: SHIPPED | OUTPATIENT
Start: 2019-06-05 | End: 2019-12-06 | Stop reason: SDUPTHER

## 2019-06-07 DIAGNOSIS — R42 VERTIGO: ICD-10-CM

## 2019-06-07 RX ORDER — MECLIZINE HYDROCHLORIDE 25 MG/1
25 TABLET ORAL 2 TIMES DAILY
Qty: 60 TABLET | Refills: 3 | Status: SHIPPED | OUTPATIENT
Start: 2019-06-07 | End: 2020-03-12 | Stop reason: SDUPTHER

## 2019-06-21 DIAGNOSIS — N39.0 URINARY TRACT INFECTION WITHOUT HEMATURIA, SITE UNSPECIFIED: Primary | ICD-10-CM

## 2019-06-21 RX ORDER — DOXYCYCLINE HYCLATE 100 MG/1
100 CAPSULE ORAL EVERY 12 HOURS SCHEDULED
Qty: 20 CAPSULE | Refills: 0 | Status: SHIPPED | OUTPATIENT
Start: 2019-06-21 | End: 2019-07-01

## 2019-06-27 DIAGNOSIS — K29.00 ACUTE GASTRITIS WITHOUT HEMORRHAGE, UNSPECIFIED GASTRITIS TYPE: Primary | ICD-10-CM

## 2019-06-27 DIAGNOSIS — N39.0 URINARY TRACT INFECTION WITHOUT HEMATURIA, SITE UNSPECIFIED: ICD-10-CM

## 2019-06-27 DIAGNOSIS — B37.9 YEAST INFECTION: ICD-10-CM

## 2019-06-27 RX ORDER — PROMETHAZINE HYDROCHLORIDE 25 MG/1
25 TABLET ORAL EVERY 8 HOURS PRN
Qty: 30 TABLET | Refills: 0 | Status: SHIPPED | OUTPATIENT
Start: 2019-06-27 | End: 2020-01-13 | Stop reason: ALTCHOICE

## 2019-06-27 RX ORDER — SUCRALFATE 1 G/1
1 TABLET ORAL 4 TIMES DAILY
Qty: 40 TABLET | Refills: 0 | Status: SHIPPED | OUTPATIENT
Start: 2019-06-27 | End: 2019-11-01

## 2019-06-27 RX ORDER — LEVOFLOXACIN 500 MG/1
500 TABLET, FILM COATED ORAL EVERY 24 HOURS
Qty: 10 TABLET | Refills: 0 | Status: SHIPPED | OUTPATIENT
Start: 2019-06-27 | End: 2019-07-07

## 2019-06-27 RX ORDER — FLUCONAZOLE 150 MG/1
150 TABLET ORAL ONCE
Qty: 1 TABLET | Refills: 0 | Status: SHIPPED | OUTPATIENT
Start: 2019-06-27 | End: 2019-06-27

## 2019-07-03 DIAGNOSIS — M96.1 POST LAMINECTOMY SYNDROME: ICD-10-CM

## 2019-07-03 DIAGNOSIS — M54.50 LUMBAR BACK PAIN: ICD-10-CM

## 2019-07-03 RX ORDER — OXYCODONE AND ACETAMINOPHEN 10; 325 MG/1; MG/1
1 TABLET ORAL EVERY 6 HOURS PRN
Qty: 120 TABLET | Refills: 0 | Status: SHIPPED | OUTPATIENT
Start: 2019-07-03 | End: 2019-08-01 | Stop reason: SDUPTHER

## 2019-07-11 DIAGNOSIS — I10 ESSENTIAL HYPERTENSION: Primary | ICD-10-CM

## 2019-07-11 RX ORDER — LISINOPRIL 20 MG/1
20 TABLET ORAL DAILY
Qty: 30 TABLET | Refills: 3 | Status: SHIPPED | OUTPATIENT
Start: 2019-07-11 | End: 2020-05-26 | Stop reason: SDUPTHER

## 2019-07-17 DIAGNOSIS — R30.0 DYSURIA: Primary | ICD-10-CM

## 2019-07-18 ENCOUNTER — APPOINTMENT (OUTPATIENT)
Dept: LAB | Facility: CLINIC | Age: 74
End: 2019-07-18
Payer: COMMERCIAL

## 2019-07-18 LAB
BILIRUB UR QL STRIP: NEGATIVE
CLARITY UR: CLEAR
COLOR UR: YELLOW
CREAT UR-MCNC: 141 MG/DL
GLUCOSE UR STRIP-MCNC: NEGATIVE MG/DL
HGB UR QL STRIP.AUTO: NEGATIVE
KETONES UR STRIP-MCNC: NEGATIVE MG/DL
LEUKOCYTE ESTERASE UR QL STRIP: NEGATIVE
MICROALBUMIN UR-MCNC: 9.5 MG/L (ref 0–20)
MICROALBUMIN/CREAT 24H UR: 7 MG/G CREATININE (ref 0–30)
NITRITE UR QL STRIP: NEGATIVE
PH UR STRIP.AUTO: 5.5 [PH]
PROT UR STRIP-MCNC: NEGATIVE MG/DL
SP GR UR STRIP.AUTO: >=1.03 (ref 1–1.03)
UROBILINOGEN UR QL STRIP.AUTO: 0.2 E.U./DL

## 2019-07-18 PROCEDURE — 82570 ASSAY OF URINE CREATININE: CPT | Performed by: NURSE PRACTITIONER

## 2019-07-18 PROCEDURE — 82043 UR ALBUMIN QUANTITATIVE: CPT | Performed by: NURSE PRACTITIONER

## 2019-07-18 PROCEDURE — 81003 URINALYSIS AUTO W/O SCOPE: CPT | Performed by: FAMILY MEDICINE

## 2019-08-01 DIAGNOSIS — N32.81 OVERACTIVE BLADDER: Primary | ICD-10-CM

## 2019-08-01 DIAGNOSIS — M96.1 POST LAMINECTOMY SYNDROME: ICD-10-CM

## 2019-08-01 DIAGNOSIS — M54.50 LUMBAR BACK PAIN: ICD-10-CM

## 2019-08-01 RX ORDER — OXYBUTYNIN CHLORIDE 5 MG/1
5 TABLET ORAL 3 TIMES DAILY
Qty: 90 TABLET | Refills: 3 | Status: SHIPPED | OUTPATIENT
Start: 2019-08-01 | End: 2020-05-19 | Stop reason: SDUPTHER

## 2019-08-01 RX ORDER — OXYCODONE AND ACETAMINOPHEN 10; 325 MG/1; MG/1
1 TABLET ORAL EVERY 6 HOURS PRN
Qty: 120 TABLET | Refills: 0 | Status: SHIPPED | OUTPATIENT
Start: 2019-08-01 | End: 2019-08-30 | Stop reason: SDUPTHER

## 2019-08-03 ENCOUNTER — APPOINTMENT (EMERGENCY)
Dept: CT IMAGING | Facility: HOSPITAL | Age: 74
End: 2019-08-03
Payer: COMMERCIAL

## 2019-08-03 ENCOUNTER — HOSPITAL ENCOUNTER (EMERGENCY)
Facility: HOSPITAL | Age: 74
Discharge: HOME | End: 2019-08-03
Attending: EMERGENCY MEDICINE | Admitting: EMERGENCY MEDICINE
Payer: COMMERCIAL

## 2019-08-03 VITALS
OXYGEN SATURATION: 97 % | TEMPERATURE: 98.2 F | RESPIRATION RATE: 18 BRPM | DIASTOLIC BLOOD PRESSURE: 63 MMHG | SYSTOLIC BLOOD PRESSURE: 136 MMHG | HEART RATE: 82 BPM | WEIGHT: 184.75 LBS | BODY MASS INDEX: 36.08 KG/M2

## 2019-08-03 DIAGNOSIS — S09.90XA INJURY OF HEAD, INITIAL ENCOUNTER: Primary | ICD-10-CM

## 2019-08-03 PROCEDURE — 70450 CT HEAD/BRAIN W/O DYE: CPT

## 2019-08-03 PROCEDURE — 99284 EMERGENCY DEPT VISIT MOD MDM: CPT | Performed by: EMERGENCY MEDICINE

## 2019-08-03 PROCEDURE — 99283 EMERGENCY DEPT VISIT LOW MDM: CPT

## 2019-08-03 RX ORDER — LISINOPRIL 10 MG/1
20 TABLET ORAL ONCE
Status: COMPLETED | OUTPATIENT
Start: 2019-08-03 | End: 2019-08-03

## 2019-08-03 RX ADMIN — LISINOPRIL 20 MG: 10 TABLET ORAL at 10:26

## 2019-08-03 NOTE — ED NOTES
Patient ambulatory to ED room 16 from waiting room with single point cane and supervision of staff  Gait steady, neg dizziness or complaints from patient  Patient lives at home with son   Patient took advil prior to arrival      Gilberto Santana RN  08/03/19 [id number]

## 2019-08-03 NOTE — ED PROVIDER NOTES
History  Chief Complaint   Patient presents with    Fall     Pt presents to ED due to head strike post fall  Pt awoken by cat at 0530 today, ambulated to cat dish to give treats, felt "wobbly" (not awake yet) and fell head first (top of head) into dry wall  Neg LOC, thinners  Pt states eyes are "not quite right, a little out of focus" post fall   Head Injury       History provided by:  Patient   used: No      59-year-old woman presents to the emergency department for evaluation after fall  Patient was feeding her CT when she fell wobbly  She states she was not fully awake when this happened  She fell forward, striking her head and was dry wall  She states she put a hole in the wall  She denies losing consciousness  She does not have any other injuries  She describes headache and that her vision is off  She does not have her glasses  She denies any bleeding  She denies any chest pain, lightheadedness, dizziness  She did not take her blood pressure medication yet this morning  Prior to Admission Medications   Prescriptions Last Dose Informant Patient Reported? Taking?    ALPRAZolam (XANAX) 0 25 mg tablet   No No   Sig: Take 1 tablet (0 25 mg total) by mouth 3 (three) times a day as needed for anxiety   IRON PO   Yes No   Sig: Take 65 mg by mouth 2 (two) times a day     Lutein 10 MG TABS  Self Yes No   Sig: Take 10 mg by mouth daily     Milnacipran HCl (SAVELLA) 100 MG TABS   No No   Sig: Take 1 tablet (100 mg total) by mouth daily   acetaminophen (TYLENOL) 325 mg tablet   No No   Sig: Take 3 tablets (975 mg total) by mouth every 8 (eight) hours as needed for mild pain   buPROPion (WELLBUTRIN XL) 150 mg 24 hr tablet   No No   Sig: Take 1 tablet (150 mg total) by mouth daily for 30 days   dicyclomine (BENTYL) 10 mg capsule   No No   Sig: Take 1 capsule (10 mg total) by mouth 3 (three) times a day as needed (colon spasms) for up to 30 days   fentaNYL (DURAGESIC) 25 mcg/hr   No No Sig: Place 1 patch on the skin every third dayMax Daily Amount: 1 patch   gabapentin (NEURONTIN) 300 mg capsule   No No   Sig: Take 1 capsule (300 mg total) by mouth 2 (two) times a day for 30 days   glucose blood test strip   No No   Sig: Bid testing   guaiFENesin 400 mg   Yes No   Sig: Take 400 mg by mouth daily as needed for cough   latanoprost (XALATAN) 0 005 % ophthalmic solution   No No   Sig: Administer 1 drop to both eyes daily at bedtime for 30 days   levothyroxine 25 mcg tablet   Yes No   Sig: Take 25 mcg by mouth daily   lisinopril (ZESTRIL) 20 mg tablet   No No   Sig: Take 1 tablet (20 mg total) by mouth daily for 30 days   loratadine (CLARITIN) 10 mg tablet   Yes No   Sig: Take 10 mg by mouth daily as needed for allergies   meclizine (ANTIVERT) 25 mg tablet   No No   Sig: Take 1 tablet (25 mg total) by mouth 2 (two) times a day for 30 days   meloxicam (MOBIC) 15 mg tablet   No No   Sig: Take 1 tablet (15 mg total) by mouth daily for 30 days   metFORMIN (GLUCOPHAGE) 1000 MG tablet   No No   Sig: Take 1 tablet (1,000 mg total) by mouth 2 (two) times a day with meals for 30 days   metaxalone (SKELAXIN) 800 mg tablet   No No   Sig: Take 1 tablet (800 mg total) by mouth 3 (three) times a day   metoclopramide (REGLAN) 5 mg tablet   Yes No   Sig: Take 5 mg by mouth 3 (three) times a day   nitrofurantoin (MACRODANTIN) 50 mg capsule   No No   Sig: Take 1 capsule (50 mg total) by mouth daily at bedtime   oxyCODONE-acetaminophen (PERCOCET)  mg per tablet   No No   Sig: Take 1 tablet by mouth every 6 (six) hours as needed for severe painMax Daily Amount: 4 tablets   oxybutynin (DITROPAN XL) 15 MG 24 hr tablet   No No   Sig: Take 1 tablet (15 mg total) by mouth daily for 30 days   oxybutynin (DITROPAN) 5 mg tablet   No No   Sig: Take 1 tablet (5 mg total) by mouth 3 (three) times a day for 30 days   pantoprazole (PROTONIX) 40 mg tablet   No No   Sig: Take 1 tablet (40 mg total) by mouth 2 (two) times daily after meals for 30 days   pitavastatin (LIVALO) 2 mg  Self Yes No   Sig: Take 2 mg by mouth daily with dinner     promethazine (PHENERGAN) 25 mg tablet   No No   Sig: Take 1 tablet (25 mg total) by mouth every 8 (eight) hours as needed for nausea or vomiting   pyridoxine (VITAMIN B6) 100 mg tablet   Yes No   Sig: Take 100 mg by mouth daily   sucralfate (CARAFATE) 1 g tablet   No No   Sig: Take 1 tablet (1 g total) by mouth 4 (four) times a day for 10 days   zolpidem (AMBIEN) 5 mg tablet   No No   Sig: Take 1 tablet (5 mg total) by mouth daily at bedtime as needed for sleep      Facility-Administered Medications: None       Past Medical History:   Diagnosis Date    Acid reflux     Anxiety     Arthritis     Asthma     Basal cell carcinoma     upper lip    Chronic narcotic dependence (HCC)     Chronic pain     Colon polyp     Cystocele     [facility]     Diverticulosis     Dysfunctional uterine bleeding     last assessed - [id number]    Fibromyalgia     Gastric ulcer     Gastroparesis     History of colonic polyps     last assessed - [id number]    History of gastroesophageal reflux (GERD)     Hypercholesterolemia     Hyperlipidemia     Hypertension     IBS (irritable bowel syndrome)     Kidney stone     Post laminectomy syndrome     Seasonal allergies     Spinal stenosis        Past Surgical History:   Procedure Laterality Date    APPENDECTOMY      BACK SURGERY      BREAST CYST ASPIRATION Left     pt unsure what exactly was removed    CHOLECYSTECTOMY      ESOPHAGOGASTRODUODENOSCOPY N/A 9/28/2016    Procedure: ESOPHAGOGASTRODUODENOSCOPY (EGD); Surgeon: Arline Meredith MD;  [facility];   Service:     HERNIA REPAIR      HYSTERECTOMY      TTAH-BSO age 25   Rodriguez LAMINECTOMY      LUMBAR LAMINECTOMY      OOPHORECTOMY      age 25    DC ARTHRODESIS POSTERIOR/POSTEROLATERAL THORACIC N/A 6/4/2018    Procedure: Reopening of lumbar incision for T12-L5 posterior instrumented fixation and fusion and T12-L4 posterior decompression;  Surgeon: Nishi Harris MD;  [facility];  Service: Neurosurgery    NV COLONOSCOPY FLX DX W/COLLJ SPEC WHEN PFRMD N/A 3/2/2016    Procedure: EGD AND COLONOSCOPY;  Surgeon: Lea Romero MD;  [facility]; Service: Gastroenterology    NV DILATE ESOPHAGUS N/A 2016    Procedure: DILATATION ESOPHAGEAL;  Surgeon: Lea Romero MD;  [facility]; Service: Gastroenterology    NV ESOPHAGOGASTRODUODENOSCOPY TRANSORAL DIAGNOSTIC N/A 2016    Procedure: ESOPHAGOGASTRODUODENOSCOPY (EGD); Surgeon: Lea Romero MD;  [facility]; Service: Gastroenterology    NV IMPLANT SPINAL NEUROSTIM/ Left 2018    Procedure: removal of left buttock implantable pulse generator and placement of new  implantable pulse generator;  Surgeon: Nishi Harris MD;  [facility];  Service: Neurosurgery       Family History   Problem Relation Age of Onset    Lung cancer Mother 55    Pulmonary embolism Father     Stroke Maternal Grandmother     Heart attack Maternal Grandfather     No Known Problems Paternal Grandmother     No Known Problems Paternal Grandfather     Diabetes Family         Diabetes mellitus    Hypertension Family     Stroke Family         Stroke complications    No Known Problems Daughter     No Known Problems Daughter     No Known Problems Sister     No Known Problems Maternal Aunt      I have reviewed and agree with the history as documented      Social History     Tobacco Use    Smoking status: Former Smoker     Last attempt to quit: 1970     Years since quittin 6    Smokeless tobacco: Never Used    Tobacco comment: Denied history of current ever day smoker, Former smoker and Never smoker all documented in [employer]   Substance Use Topics    Alcohol use: No     Comment: Denied history of alcohol use    Drug use: No     Comment: Denied history of drug use        Review of Systems Constitutional: Negative for activity change, appetite change, chills, diaphoresis, fatigue and fever  HENT: Negative for congestion, dental problem, ear discharge, facial swelling, nosebleeds, rhinorrhea, sinus pressure and trouble swallowing  Eyes: Negative for photophobia, discharge, itching and visual disturbance  Respiratory: Negative for choking, chest tightness and shortness of breath  Cardiovascular: Negative for chest pain, palpitations and leg swelling  Gastrointestinal: Negative for abdominal distention, abdominal pain, constipation, diarrhea, nausea and vomiting  Endocrine: Negative for polydipsia and polyphagia  Genitourinary: Negative for decreased urine volume, difficulty urinating, dysuria, flank pain, frequency, hematuria, vaginal bleeding and vaginal discharge  Musculoskeletal: Negative for back pain, gait problem, joint swelling, neck pain and neck stiffness  Skin: Negative for color change and rash  Neurological: Positive for headaches  Negative for dizziness, facial asymmetry, speech difficulty, weakness and light-headedness  Psychiatric/Behavioral: Negative for agitation and behavioral problems  The patient is not nervous/anxious and is not hyperactive  All other systems reviewed and are negative  Physical Exam  Physical Exam   Constitutional: She is oriented to person, place, and time  She appears well-developed and well-nourished  No distress  HENT:   Head: Normocephalic and atraumatic  Eyes: Pupils are equal, round, and reactive to light  EOM are normal    Neck: Normal range of motion  Neck supple  Cardiovascular: Normal rate, regular rhythm and normal heart sounds  No murmur heard  Pulmonary/Chest: Effort normal and breath sounds normal  No respiratory distress  She has no wheezes  She has no rales  Abdominal: Soft  Bowel sounds are normal  She exhibits no distension  There is no tenderness  There is no rebound and no guarding     Musculoskeletal: Normal range of motion  She exhibits no edema or deformity  Lymphadenopathy:     She has no cervical adenopathy  Neurological: She is alert and oriented to person, place, and time  No cranial nerve deficit  She exhibits normal muscle tone  Coordination normal    Normal cranial nerve exam   Normal strength and sensation bilateral upper and lower extremities  Normal coordination  Gait at baseline with cane   Skin: Capillary refill takes less than 2 seconds  No rash noted  No erythema  Psychiatric: She has a normal mood and affect  Her behavior is normal    Nursing note and vitals reviewed  Vital Signs  ED Triage Vitals [08/03/19 1005]   Temperature Pulse Respirations Blood Pressure SpO2   98 2 °F (36 8 °C) 82 18 (!) 194/84 97 %      Temp Source Heart Rate Source Patient Position - Orthostatic VS BP Location FiO2 (%)   Oral Monitor Sitting Right arm --      Pain Score       3           Vitals:    08/03/19 1005 08/03/19 1047   BP: (!) 194/84 136/63   Pulse: 82    Patient Position - Orthostatic VS: Sitting Lying         Visual Acuity      ED Medications  Medications   lisinopril (ZESTRIL) tablet 20 mg (20 mg Oral Given 8/3/19 1026)       Diagnostic Studies  Results Reviewed     None                 CT head without contrast   Final Result by Heidi Johnson DO (08/03 1044)   Cerebral atrophy with chronic small vessel ischemic change  No acute intracranial abnormality  Workstation performed: [id number]                    Procedures  Procedures       ED Course                               MDM  Number of Diagnoses or Management Options  Injury of head, initial encounter:   Diagnosis management comments: Patient with mechanical fall  Struck head on the wall and had large hole in drywall  Describes headache  Denies any numbness, weakness, tingling  Hypertensive upon arrival but did not take home blood pressure medication  This given to patient with improvement of her blood pressure  CT head due to headache, age, blurred vision  CT head with no acute findings  Patient with no progression of symptoms in ER  She is stable for discharge home  Potentially mild concussion  Precautions given to patient  She is ambulatory at her baseline, no acute distress, no additional areas of injury  Disposition  Final diagnoses:   Injury of head, initial encounter     Time reflects when diagnosis was documented in both MDM as applicable and the Disposition within this note     Time User Action Codes Description Comment    8/3/2019 10:49 AM Karina Moran [[id number]] Injury of head, initial encounter       ED Disposition     ED Disposition Condition Date/Time Comment    Discharge Stable Sat Aug 3, 2019 10:49 AM Irma Varela discharge to home/self care              Follow-up Information     Follow up With Specialties Details Why Contact Info Additional [address], [address], Nurse Practitioner Go to  As needed [address]  [address]  [address]  [phone]       [address] Emergency Department Emergency Medicine Go to  If symptoms worsen [address]  [phone] [facility], [address], [place], [place], [address]          Discharge Medication List as of 8/3/2019 10:49 AM      CONTINUE these medications which have NOT CHANGED    Details   acetaminophen (TYLENOL) 325 mg tablet Take 3 tablets (975 mg total) by mouth every 8 (eight) hours as needed for mild pain, Starting Fri 6/8/2018, Print      ALPRAZolam Brunetta Dome) 0 25 mg tablet Take 1 tablet (0 25 mg total) by mouth 3 (three) times a day as needed for anxiety, Starting Mon 2/4/2019, Print      buPROPion (WELLBUTRIN XL) 150 mg 24 hr tablet Take 1 tablet (150 mg total) by mouth daily for 30 days, Starting Mon 8/6/2018, Until Wed 9/5/2018, Normal      dicyclomine (BENTYL) 10 mg capsule Take 1 capsule (10 mg total) by mouth 3 (three) times a day as needed (colon spasms) for up to 30 days, Starting Mon 1/7/2019, Until Wed 2/6/2019, Print      fentaNYL ([address]) 25 mcg/hr Place 1 patch on the skin every third dayMax Daily Amount: 1 patch, Starting Mon 2/18/2019, Normal      gabapentin (NEURONTIN) 300 mg capsule Take 1 capsule (300 mg total) by mouth 2 (two) times a day for 30 days, Starting Tue 5/28/2019, Until Thu 6/27/2019, Normal      glucose blood test strip Bid testing, Print      guaiFENesin 400 mg Take 400 mg by mouth daily as needed for cough, Historical Med      IRON PO Take 65 mg by mouth 2 (two) times a day  , Historical Med      latanoprost (XALATAN) 0 005 % ophthalmic solution Administer 1 drop to both eyes daily at bedtime for 30 days, Starting Mon 8/6/2018, Until Wed 9/5/2018, Normal      levothyroxine 25 mcg tablet Take 25 mcg by mouth daily, Historical Med      lisinopril (ZESTRIL) 20 mg tablet Take 1 tablet (20 mg total) by mouth daily for 30 days, Starting Thu 7/11/2019, Until Sat 8/10/2019, Normal      loratadine (CLARITIN) 10 mg tablet Take 10 mg by mouth daily as needed for allergies, Historical Med      Lutein 10 MG TABS Take 10 mg by mouth daily  , Historical Med      meclizine (ANTIVERT) 25 mg tablet Take 1 tablet (25 mg total) by mouth 2 (two) times a day for 30 days, Starting Fri 6/7/2019, Until Sun 7/7/2019, Normal      meloxicam (MOBIC) 15 mg tablet Take 1 tablet (15 mg total) by mouth daily for 30 days, Starting Wed 6/5/2019, Until Fri 7/5/2019, Normal      metaxalone (SKELAXIN) 800 mg tablet Take 1 tablet (800 mg total) by mouth 3 (three) times a day, Starting Mon 4/1/2019, Normal      metFORMIN (GLUCOPHAGE) 1000 MG tablet Take 1 tablet (1,000 mg total) by mouth 2 (two) times a day with meals for 30 days, Starting Thu 3/28/2019, Until Sat 4/27/2019, Print      metoclopramide (REGLAN) 5 mg tablet Take 5 mg by mouth 3 (three) times a day, Historical Med      Milnacipran HCl (SAVELLA) 100 MG TABS Take 1 tablet (100 mg total) by mouth daily, Starting Mon 1/14/2019, Print      nitrofurantoin (MACRODANTIN) 50 mg capsule Take 1 capsule (50 mg total) by mouth daily at bedtime, Starting Mon 2/18/2019, Normal      oxybutynin (DITROPAN XL) 15 MG 24 hr tablet Take 1 tablet (15 mg total) by mouth daily for 30 days, Starting Mon 3/4/2019, Until Wed 4/3/2019, Normal      oxybutynin (DITROPAN) 5 mg tablet Take 1 tablet (5 mg total) by mouth 3 (three) times a day for 30 days, Starting Thu 8/1/2019, Until Sat 8/31/2019, Normal      oxyCODONE-acetaminophen (PERCOCET)  mg per tablet Take 1 tablet by mouth every 6 (six) hours as needed for severe painMax Daily Amount: 4 tablets, Starting u 8/1/2019, Normal      pantoprazole (PROTONIX) 40 mg tablet Take 1 tablet (40 mg total) by mouth 2 (two) times daily after meals for 30 days, Starting Wed 5/1/2019, Until Fri 5/31/2019, Normal      pitavastatin (LIVALO) 2 mg Take 2 mg by mouth daily with dinner  , Historical Med      promethazine (PHENERGAN) 25 mg tablet Take 1 tablet (25 mg total) by mouth every 8 (eight) hours as needed for nausea or vomiting, Starting Thu 6/27/2019, Normal      pyridoxine (VITAMIN B6) 100 mg tablet Take 100 mg by mouth daily, Historical Med      sucralfate (CARAFATE) 1 g tablet Take 1 tablet (1 g total) by mouth 4 (four) times a day for 10 days, Starting Thu 6/27/2019, Until Sun 7/7/2019, Print      zolpidem (AMBIEN) 5 mg tablet Take 1 tablet (5 mg total) by mouth daily at bedtime as needed for sleep, Starting Thu 5/30/2019, Normal           No discharge procedures on file      ED Provider  Electronically Signed by           Kate Jaimes MD  08/03/19 8035

## 2019-08-30 DIAGNOSIS — M96.1 POST LAMINECTOMY SYNDROME: ICD-10-CM

## 2019-08-30 DIAGNOSIS — M54.50 LUMBAR BACK PAIN: ICD-10-CM

## 2019-08-30 RX ORDER — OXYCODONE AND ACETAMINOPHEN 10; 325 MG/1; MG/1
1 TABLET ORAL EVERY 6 HOURS PRN
Qty: 120 TABLET | Refills: 0 | Status: SHIPPED | OUTPATIENT
Start: 2019-08-30 | End: 2019-10-02 | Stop reason: SDUPTHER

## 2019-09-03 DIAGNOSIS — N39.0 URINARY TRACT INFECTION WITHOUT HEMATURIA, SITE UNSPECIFIED: Primary | ICD-10-CM

## 2019-09-03 RX ORDER — LEVOFLOXACIN 500 MG/1
500 TABLET, FILM COATED ORAL EVERY 24 HOURS
Qty: 10 TABLET | Refills: 1 | Status: SHIPPED | OUTPATIENT
Start: 2019-09-03 | End: 2019-09-13

## 2019-09-21 DIAGNOSIS — J01.90 ACUTE SINUSITIS, RECURRENCE NOT SPECIFIED, UNSPECIFIED LOCATION: Primary | ICD-10-CM

## 2019-09-21 RX ORDER — CIPROFLOXACIN 500 MG/1
TABLET, FILM COATED ORAL
Qty: 30 TABLET | Refills: 1 | Status: SHIPPED | OUTPATIENT
Start: 2019-09-21 | End: 2019-10-10

## 2019-10-02 DIAGNOSIS — M54.50 LUMBAR BACK PAIN: ICD-10-CM

## 2019-10-02 DIAGNOSIS — F41.9 ANXIETY: ICD-10-CM

## 2019-10-02 DIAGNOSIS — M96.1 POST LAMINECTOMY SYNDROME: ICD-10-CM

## 2019-10-02 RX ORDER — ALPRAZOLAM 0.25 MG/1
0.25 TABLET ORAL 3 TIMES DAILY PRN
Qty: 90 TABLET | Refills: 1 | Status: SHIPPED | OUTPATIENT
Start: 2019-10-02 | End: 2019-12-05 | Stop reason: ALTCHOICE

## 2019-10-02 RX ORDER — ALPRAZOLAM 0.25 MG/1
0.25 TABLET ORAL 3 TIMES DAILY PRN
Qty: 90 TABLET | Refills: 1 | Status: SHIPPED | OUTPATIENT
Start: 2019-10-02 | End: 2019-10-02 | Stop reason: SDUPTHER

## 2019-10-02 RX ORDER — OXYCODONE AND ACETAMINOPHEN 10; 325 MG/1; MG/1
1 TABLET ORAL EVERY 6 HOURS PRN
Qty: 120 TABLET | Refills: 0 | Status: SHIPPED | OUTPATIENT
Start: 2019-10-02 | End: 2019-11-01 | Stop reason: SDUPTHER

## 2019-10-11 ENCOUNTER — IMMUNIZATIONS (OUTPATIENT)
Dept: FAMILY MEDICINE CLINIC | Facility: CLINIC | Age: 74
End: 2019-10-11
Payer: COMMERCIAL

## 2019-10-11 DIAGNOSIS — E11.9 TYPE 2 DIABETES MELLITUS WITHOUT COMPLICATION, WITHOUT LONG-TERM CURRENT USE OF INSULIN (HCC): Primary | ICD-10-CM

## 2019-10-11 DIAGNOSIS — Z23 NEED FOR INFLUENZA VACCINATION: ICD-10-CM

## 2019-10-11 LAB — SL AMB POCT HEMOGLOBIN AIC: 6.2 (ref ?–6.5)

## 2019-10-11 PROCEDURE — G0008 ADMIN INFLUENZA VIRUS VAC: HCPCS | Performed by: FAMILY MEDICINE

## 2019-10-11 PROCEDURE — 83036 HEMOGLOBIN GLYCOSYLATED A1C: CPT | Performed by: FAMILY MEDICINE

## 2019-10-11 PROCEDURE — 90662 IIV NO PRSV INCREASED AG IM: CPT | Performed by: FAMILY MEDICINE

## 2019-10-11 NOTE — PROGRESS NOTES
Pt is here for a flu vaccine    Also, had her HGA1C tested in office due to being over due  Result was 6 2%

## 2019-10-23 DIAGNOSIS — K57.92 ACUTE DIVERTICULITIS: Primary | ICD-10-CM

## 2019-10-23 RX ORDER — METRONIDAZOLE 500 MG/1
500 TABLET ORAL EVERY 8 HOURS SCHEDULED
Qty: 20 TABLET | Refills: 0 | Status: SHIPPED | OUTPATIENT
Start: 2019-10-23 | End: 2019-11-02

## 2019-10-23 RX ORDER — HYOSCYAMINE SULFATE 0.125 MG
0.12 TABLET,DISINTEGRATING ORAL EVERY 4 HOURS PRN
Qty: 40 TABLET | Refills: 1 | Status: SHIPPED | OUTPATIENT
Start: 2019-10-23 | End: 2021-05-12 | Stop reason: HOSPADM

## 2019-10-23 RX ORDER — CIPROFLOXACIN 500 MG/1
500 TABLET, FILM COATED ORAL EVERY 12 HOURS SCHEDULED
Qty: 20 TABLET | Refills: 0 | Status: SHIPPED | OUTPATIENT
Start: 2019-10-23 | End: 2019-11-02

## 2019-11-01 DIAGNOSIS — M96.1 POST LAMINECTOMY SYNDROME: ICD-10-CM

## 2019-11-01 DIAGNOSIS — M54.50 LUMBAR BACK PAIN: ICD-10-CM

## 2019-11-01 RX ORDER — OXYCODONE AND ACETAMINOPHEN 10; 325 MG/1; MG/1
1 TABLET ORAL EVERY 6 HOURS PRN
Qty: 120 TABLET | Refills: 0 | Status: SHIPPED | OUTPATIENT
Start: 2019-11-01 | End: 2019-11-30 | Stop reason: SDUPTHER

## 2019-11-04 DIAGNOSIS — J20.9 ACUTE BRONCHITIS, UNSPECIFIED ORGANISM: Primary | ICD-10-CM

## 2019-11-04 RX ORDER — CLARITHROMYCIN 500 MG/1
500 TABLET, COATED ORAL EVERY 12 HOURS SCHEDULED
Qty: 20 TABLET | Refills: 0 | Status: SHIPPED | OUTPATIENT
Start: 2019-11-04 | End: 2019-11-14

## 2019-11-18 DIAGNOSIS — M54.50 LUMBAR BACK PAIN: Primary | ICD-10-CM

## 2019-11-18 RX ORDER — BACLOFEN 10 MG/1
10 TABLET ORAL 3 TIMES DAILY PRN
Qty: 30 TABLET | Refills: 2 | Status: SHIPPED | OUTPATIENT
Start: 2019-11-18 | End: 2020-01-13 | Stop reason: SDUPTHER

## 2019-11-25 DIAGNOSIS — J06.9 UPPER RESPIRATORY TRACT INFECTION, UNSPECIFIED TYPE: Primary | ICD-10-CM

## 2019-11-25 RX ORDER — LEVOFLOXACIN 500 MG/1
500 TABLET, FILM COATED ORAL EVERY 24 HOURS
Qty: 10 TABLET | Refills: 0 | Status: SHIPPED | OUTPATIENT
Start: 2019-11-25 | End: 2019-12-05

## 2019-11-25 RX ORDER — BENZONATATE 200 MG/1
200 CAPSULE ORAL 3 TIMES DAILY PRN
Qty: 30 CAPSULE | Refills: 1 | Status: SHIPPED | OUTPATIENT
Start: 2019-11-25 | End: 2019-12-05 | Stop reason: ALTCHOICE

## 2019-11-29 NOTE — PROGRESS NOTES
Patient exhibited redness in face/chest/neck area  This occurred when she got up to go to the bathroom and returned to her bed  She is not c/o itching however she feels warm to touch and does feel "hot" herself  Will obtain a set of VS, COOPER Hines with neuropsych aware   We will continue to monitor the patient SICU

## 2019-11-30 DIAGNOSIS — M96.1 POST LAMINECTOMY SYNDROME: ICD-10-CM

## 2019-11-30 DIAGNOSIS — M54.50 LUMBAR BACK PAIN: ICD-10-CM

## 2019-11-30 RX ORDER — OXYCODONE AND ACETAMINOPHEN 10; 325 MG/1; MG/1
1 TABLET ORAL EVERY 6 HOURS PRN
Qty: 120 TABLET | Refills: 0 | Status: SHIPPED | OUTPATIENT
Start: 2019-11-30 | End: 2020-01-06 | Stop reason: SDUPTHER

## 2019-12-05 ENCOUNTER — HOSPITAL ENCOUNTER (EMERGENCY)
Facility: HOSPITAL | Age: 74
Discharge: HOME | End: 2019-12-05
Admitting: NURSE PRACTITIONER
Payer: COMMERCIAL

## 2019-12-05 ENCOUNTER — TELEPHONE (OUTPATIENT)
Dept: FAMILY MEDICINE CLINIC | Facility: CLINIC | Age: 74
End: 2019-12-05

## 2019-12-05 ENCOUNTER — OFFICE VISIT (OUTPATIENT)
Dept: FAMILY MEDICINE CLINIC | Facility: CLINIC | Age: 74
End: 2019-12-05
Payer: COMMERCIAL

## 2019-12-05 ENCOUNTER — HOSPITAL ENCOUNTER (OUTPATIENT)
Dept: RADIOLOGY | Facility: HOSPITAL | Age: 74
Discharge: HOME | End: 2019-12-05
Payer: COMMERCIAL

## 2019-12-05 ENCOUNTER — APPOINTMENT (OUTPATIENT)
Dept: LAB | Facility: CLINIC | Age: 74
End: 2019-12-05
Payer: COMMERCIAL

## 2019-12-05 ENCOUNTER — TRANSCRIBE ORDERS (OUTPATIENT)
Dept: LAB | Facility: HOSPITAL | Age: 74
End: 2019-12-05

## 2019-12-05 VITALS
HEIGHT: 60 IN | WEIGHT: 181 LBS | TEMPERATURE: 98.4 F | HEART RATE: 102 BPM | SYSTOLIC BLOOD PRESSURE: 110 MMHG | DIASTOLIC BLOOD PRESSURE: 60 MMHG | BODY MASS INDEX: 35.53 KG/M2

## 2019-12-05 DIAGNOSIS — D50.8 IRON DEFICIENCY ANEMIA SECONDARY TO INADEQUATE DIETARY IRON INTAKE: Primary | ICD-10-CM

## 2019-12-05 DIAGNOSIS — D50.8 IRON DEFICIENCY ANEMIA SECONDARY TO INADEQUATE DIETARY IRON INTAKE: ICD-10-CM

## 2019-12-05 DIAGNOSIS — E46 HYPOALBUMINEMIA DUE TO PROTEIN-CALORIE MALNUTRITION (HCC): ICD-10-CM

## 2019-12-05 DIAGNOSIS — R41.0 CONFUSION: ICD-10-CM

## 2019-12-05 DIAGNOSIS — E88.09 HYPOALBUMINEMIA DUE TO PROTEIN-CALORIE MALNUTRITION (HCC): ICD-10-CM

## 2019-12-05 LAB
ABO GROUP BLD: NORMAL
ALBUMIN SERPL BCP-MCNC: 2.9 G/DL (ref 3.5–5)
ALP SERPL-CCNC: 87 U/L (ref 46–116)
ALT SERPL W P-5'-P-CCNC: 14 U/L (ref 12–78)
ANION GAP SERPL CALCULATED.3IONS-SCNC: 10 MMOL/L (ref 4–13)
AST SERPL W P-5'-P-CCNC: 14 U/L (ref 5–45)
BASOPHILS # BLD AUTO: 0.08 THOUSANDS/ΜL (ref 0–0.1)
BASOPHILS NFR BLD AUTO: 1 % (ref 0–1)
BILIRUB SERPL-MCNC: 0.29 MG/DL (ref 0.2–1)
BLD GP AB SCN SERPL QL: NEGATIVE
BUN SERPL-MCNC: 26 MG/DL (ref 5–25)
CALCIUM SERPL-MCNC: 8.7 MG/DL (ref 8.3–10.1)
CHLORIDE SERPL-SCNC: 105 MMOL/L (ref 100–108)
CO2 SERPL-SCNC: 25 MMOL/L (ref 21–32)
CREAT SERPL-MCNC: 1.55 MG/DL (ref 0.6–1.3)
EOSINOPHIL # BLD AUTO: 0.32 THOUSAND/ΜL (ref 0–0.61)
EOSINOPHIL NFR BLD AUTO: 4 % (ref 0–6)
ERYTHROCYTE [DISTWIDTH] IN BLOOD BY AUTOMATED COUNT: 17.8 % (ref 11.6–15.1)
FERRITIN SERPL-MCNC: 8 NG/ML (ref 8–388)
GFR SERPL CREATININE-BSD FRML MDRD: 32 ML/MIN/1.73SQ M
GLUCOSE P FAST SERPL-MCNC: 165 MG/DL (ref 65–99)
HCT VFR BLD AUTO: 23.6 % (ref 34.8–46.1)
HGB BLD-MCNC: 6.3 G/DL (ref 11.5–15.4)
IMM GRANULOCYTES # BLD AUTO: 0.02 THOUSAND/UL (ref 0–0.2)
IMM GRANULOCYTES NFR BLD AUTO: 0 % (ref 0–2)
LYMPHOCYTES # BLD AUTO: 2.05 THOUSANDS/ΜL (ref 0.6–4.47)
LYMPHOCYTES NFR BLD AUTO: 27 % (ref 14–44)
MCH RBC QN AUTO: 19.9 PG (ref 26.8–34.3)
MCHC RBC AUTO-ENTMCNC: 26.7 G/DL (ref 31.4–37.4)
MCV RBC AUTO: 74 FL (ref 82–98)
MONOCYTES # BLD AUTO: 0.97 THOUSAND/ΜL (ref 0.17–1.22)
MONOCYTES NFR BLD AUTO: 13 % (ref 4–12)
NEUTROPHILS # BLD AUTO: 4.31 THOUSANDS/ΜL (ref 1.85–7.62)
NEUTS SEG NFR BLD AUTO: 55 % (ref 43–75)
NRBC BLD AUTO-RTO: 0 /100 WBCS
PLATELET # BLD AUTO: 275 THOUSANDS/UL (ref 149–390)
PMV BLD AUTO: 9.8 FL (ref 8.9–12.7)
POTASSIUM SERPL-SCNC: 4.2 MMOL/L (ref 3.5–5.3)
PROT SERPL-MCNC: 6.1 G/DL (ref 6.4–8.2)
RBC # BLD AUTO: 3.17 MILLION/UL (ref 3.81–5.12)
RH BLD: POSITIVE
SODIUM SERPL-SCNC: 140 MMOL/L (ref 136–145)
SPECIMEN EXPIRATION DATE: NORMAL
TIBC SERPL-MCNC: 388 UG/DL (ref 250–450)
WBC # BLD AUTO: 7.75 THOUSAND/UL (ref 4.31–10.16)

## 2019-12-05 PROCEDURE — 1160F RVW MEDS BY RX/DR IN RCRD: CPT | Performed by: NURSE PRACTITIONER

## 2019-12-05 PROCEDURE — 86901 BLOOD TYPING SEROLOGIC RH(D): CPT | Performed by: NURSE PRACTITIONER

## 2019-12-05 PROCEDURE — 86850 RBC ANTIBODY SCREEN: CPT | Performed by: NURSE PRACTITIONER

## 2019-12-05 PROCEDURE — 99214 OFFICE O/P EST MOD 30 MIN: CPT | Performed by: NURSE PRACTITIONER

## 2019-12-05 PROCEDURE — 83550 IRON BINDING TEST: CPT

## 2019-12-05 PROCEDURE — 71046 X-RAY EXAM CHEST 2 VIEWS: CPT

## 2019-12-05 PROCEDURE — 36415 COLL VENOUS BLD VENIPUNCTURE: CPT

## 2019-12-05 PROCEDURE — 85025 COMPLETE CBC W/AUTO DIFF WBC: CPT

## 2019-12-05 PROCEDURE — 80053 COMPREHEN METABOLIC PANEL: CPT

## 2019-12-05 PROCEDURE — 1036F TOBACCO NON-USER: CPT | Performed by: NURSE PRACTITIONER

## 2019-12-05 PROCEDURE — 82728 ASSAY OF FERRITIN: CPT

## 2019-12-05 PROCEDURE — 86900 BLOOD TYPING SEROLOGIC ABO: CPT | Performed by: NURSE PRACTITIONER

## 2019-12-05 RX ORDER — SODIUM CHLORIDE 9 MG/ML
20 INJECTION, SOLUTION INTRAVENOUS ONCE
Status: CANCELLED | OUTPATIENT
Start: 2019-12-06

## 2019-12-05 RX ORDER — SODIUM CHLORIDE 9 MG/ML
20 INJECTION, SOLUTION INTRAVENOUS ONCE
Status: CANCELLED | OUTPATIENT
Start: 2019-12-05

## 2019-12-05 NOTE — PROGRESS NOTES
Assessment/Plan:     Diagnoses and all orders for this visit:    Iron deficiency anemia secondary to inadequate dietary iron intake    Hypoalbuminemia due to protein-calorie malnutrition (HCC)    #1 Iron deficiency anemia 2/2 inadequate dietary iron  Discussed with patient plan to treat to with transfusing 2 units of packed cells with repeat CBC in one week  Blood Infusion Orders placed via [place] [id number] Blood Hold tubes ordered placed and patient sent to [facility] for blood draw  Patient instructed to go to [facility] by 0900 tomorrow to have the two units of packed cells infused - consent obtain tonight in office  #2 Hypoalbuminemia d/t protein-calorie malnutrition  Patient to follow up next week for discussion regarding diet and supplements to improve iron and red blood cell production  Patient instructed to call for problems or concerns in the interim    Subjective:      Patient ID: Minerva Rosales is a 66 y o  female  66 y  o female presenting with reported signs of weakness, confusion, decreased concentration and generalized fatigue for the past weeks  Patient is currently undergoing a traumatic move with her son from a [facility] to an apartment  She shares her residence with her son  She does not want to bother her son so skips meals at times  Patient's family reports that she does not eat healthy meals by their observation of refrigerator contents  Patient reports a typical days meals are some oatmeal or a waffle for breakfast, a skipped meal or yogrut for lunch and a dinner of chicken/fish/red meat and a vegetable            Family History   Problem Relation Age of Onset    Lung cancer Mother 55    Pulmonary embolism Father     Stroke Maternal Grandmother     Heart attack Maternal Grandfather     No Known Problems Paternal Grandmother     No Known Problems Paternal Grandfather     Diabetes Family         Diabetes mellitus    Hypertension Family     Stroke Family         Stroke complications    No Known Problems Daughter     No Known Problems Daughter     No Known Problems Sister     No Known Problems Maternal Aunt      Social History     Socioeconomic History    Marital status:       Spouse name: Not on file    Number of children: Not on file    Years of education: Not on file    Highest education level: Not on file   Occupational History    Occupation: Retired   Social Needs    Financial resource strain: Not on file    Food insecurity:     Worry: Not on file     Inability: Not on file   [employer] needs:     Medical: Not on file     Non-medical: Not on file   Tobacco Use    Smoking status: Former Smoker     Last attempt to quit: 1970     Years since quittin 9    Smokeless tobacco: Never Used    Tobacco comment: Denied history of current ever day smoker, Former smoker and Never smoker all documented in [employer]   Substance and Sexual Activity    Alcohol use: No     Comment: Denied history of alcohol use    Drug use: No     Comment: Denied history of drug use    Sexual activity: Not Currently   Lifestyle    Physical activity:     Days per week: Not on file     Minutes per session: Not on file    Stress: Not on file   Relationships    Social connections:     Talks on phone: Not on file     Gets together: Not on file     Attends Jain service: Not on file     Active member of club or organization: Not on file     Attends meetings of clubs or organizations: Not on file     Relationship status: Not on file    Intimate partner violence:     Fear of current or ex partner: Not on file     Emotionally abused: Not on file     Physically abused: Not on file     Forced sexual activity: Not on file   Other Topics Concern    Not on file   Social History Narrative    Marital History - Currently  per [employer]     Past Medical History:   Diagnosis Date    Acid reflux     Anxiety     Arthritis  Asthma     Basal cell carcinoma     upper lip    Chronic narcotic dependence (HCC)     Chronic pain     Colon polyp     Cystocele     Diabetes mellitus ([facility] [address] )     Diverticulosis     Dysfunctional uterine bleeding     last assessed - [id number]    Fibromyalgia     Gastric ulcer     Gastroparesis     History of colonic polyps     last assessed - [id number]    History of gastroesophageal reflux (GERD)     Hypercholesterolemia     Hyperlipidemia     Hypertension     IBS (irritable bowel syndrome)     Kidney stone     Post laminectomy syndrome     Seasonal allergies     Spinal stenosis      Past Surgical History:   Procedure Laterality Date    APPENDECTOMY      BACK SURGERY      BREAST CYST ASPIRATION Left     pt unsure what exactly was removed    CHOLECYSTECTOMY      ESOPHAGOGASTRODUODENOSCOPY N/A 9/28/2016    Procedure: ESOPHAGOGASTRODUODENOSCOPY (EGD); Surgeon: Dimitry Wagoner MD;  [facility]; Service:     HERNIA REPAIR      HYSTERECTOMY      TTAH-BSO age 25   Rodriguez LAMINECTOMY      LUMBAR LAMINECTOMY      OOPHORECTOMY      age 25   Rodriguez NV ARTHRODESIS POSTERIOR/POSTEROLATERAL THORACIC N/A 6/4/2018    Procedure: Reopening of lumbar incision for T12-L5 posterior instrumented fixation and fusion and T12-L4 posterior decompression;  Surgeon: Graciela Cotto MD;  [facility];  Service: Neurosurgery    NV COLONOSCOPY FLX DX W/COLLJ SPEC WHEN PFRMD N/A 3/2/2016    Procedure: EGD AND COLONOSCOPY;  Surgeon: Dimitry Wagoner MD;  [facility]; Service: Gastroenterology    NV DILATE ESOPHAGUS N/A 9/28/2016    Procedure: DILATATION ESOPHAGEAL;  Surgeon: Dimitry Wagoner MD;  [facility]; Service: Gastroenterology    NV ESOPHAGOGASTRODUODENOSCOPY TRANSORAL DIAGNOSTIC N/A 7/18/2016    Procedure: ESOPHAGOGASTRODUODENOSCOPY (EGD); Surgeon: Dimitry Wagoner MD;  [facility];   Service: Gastroenterology    NV IMPLANT SPINAL NEUROSTIM/ Left 6/4/2018    Procedure: removal of left buttock implantable pulse generator and placement of new  implantable pulse generator;  Surgeon: Yousuf Duams MD;  [facility];  Service: Neurosurgery     Allergies   Allergen Reactions    Penicillins Anaphylaxis and Other (See Comments)     Other reaction(s): Unknown Reaction    Sulfa Antibiotics Anaphylaxis and Other (See Comments)     Other reaction(s): Unknown Reaction    Aspartame Other (See Comments) and Hypertension     Slurred speech, weakness, stroke sx    Iodinated Diagnostic Agents Hives     Pt has taken prep prior for contrast and has not had any break through reaction       Current Outpatient Medications:     baclofen 10 mg tablet, Take 1 tablet (10 mg total) by mouth 3 (three) times a day as needed for muscle spasms for up to 10 days, Disp: 30 tablet, Rfl: 2    buPROPion (WELLBUTRIN XL) 150 mg 24 hr tablet, Take 1 tablet (150 mg total) by mouth daily for 30 days, Disp: 30 tablet, Rfl: 3    dicyclomine (BENTYL) 10 mg capsule, Take 1 capsule (10 mg total) by mouth 3 (three) times a day as needed (colon spasms) for up to 30 days, Disp: 60 capsule, Rfl: 3    gabapentin (NEURONTIN) 300 mg capsule, Take 1 capsule (300 mg total) by mouth 2 (two) times a day for 30 days, Disp: 60 capsule, Rfl: 3    glucose blood test strip, Bid testing, Disp: 100 each, Rfl: 6    guaiFENesin 400 mg, Take 400 mg by mouth daily as needed for cough, Disp: , Rfl:     hyoscyamine (ANASPAZ) 0 125 mg, Take 1 tablet (0 125 mg total) by mouth every 4 (four) hours as needed (spasms) for up to 10 days, Disp: 40 tablet, Rfl: 1    IRON PO, Take 65 mg by mouth 2 (two) times a day  , Disp: , Rfl:     latanoprost (XALATAN) 0 005 % ophthalmic solution, Administer 1 drop to both eyes daily at bedtime for 30 days, Disp: 2 5 mL, Rfl: 3    levofloxacin (LEVAQUIN) 500 mg tablet, Take 1 tablet (500 mg total) by mouth every 24 hours for 10 days, Disp: 10 tablet, Rfl: 0    levothyroxine 25 mcg tablet, Take 25 mcg by mouth daily, Disp: , Rfl:     lisinopril (ZESTRIL) 20 mg tablet, Take 1 tablet (20 mg total) by mouth daily for 30 days, Disp: 30 tablet, Rfl: 3    loratadine (CLARITIN) 10 mg tablet, Take 10 mg by mouth daily as needed for allergies, Disp: , Rfl:     Lutein 10 MG TABS, Take 10 mg by mouth daily  , Disp: , Rfl:     meclizine (ANTIVERT) 25 mg tablet, Take 1 tablet (25 mg total) by mouth 2 (two) times a day for 30 days, Disp: 60 tablet, Rfl: 3    meloxicam (MOBIC) 15 mg tablet, Take 1 tablet (15 mg total) by mouth daily for 30 days, Disp: 30 tablet, Rfl: 3    metaxalone (SKELAXIN) 800 mg tablet, Take 1 tablet (800 mg total) by mouth 3 (three) times a day, Disp: 90 tablet, Rfl: 3    metFORMIN (GLUCOPHAGE) 1000 MG tablet, Take 1 tablet (1,000 mg total) by mouth 2 (two) times a day with meals for 30 days, Disp: 60 tablet, Rfl: 3    metoclopramide (REGLAN) 5 mg tablet, Take 5 mg by mouth 3 (three) times a day, Disp: , Rfl:     Milnacipran HCl (SAVELLA) 100 MG TABS, Take 1 tablet (100 mg total) by mouth daily, Disp: 30 tablet, Rfl: 3    nitrofurantoin (MACRODANTIN) 50 mg capsule, Take 1 capsule (50 mg total) by mouth daily at bedtime, Disp: 30 capsule, Rfl: 3    oxybutynin (DITROPAN XL) 15 MG 24 hr tablet, Take 1 tablet (15 mg total) by mouth daily for 30 days, Disp: 30 tablet, Rfl: 3    oxybutynin (DITROPAN) 5 mg tablet, Take 1 tablet (5 mg total) by mouth 3 (three) times a day for 30 days, Disp: 90 tablet, Rfl: 3    oxyCODONE-acetaminophen (PERCOCET)  mg per tablet, Take 1 tablet by mouth every 6 (six) hours as needed for severe painMax Daily Amount: 4 tablets, Disp: 120 tablet, Rfl: 0    pantoprazole (PROTONIX) 40 mg tablet, Take 1 tablet (40 mg total) by mouth 2 (two) times daily after meals for 30 days, Disp: 60 tablet, Rfl: 3    pitavastatin (LIVALO) 2 mg, Take 2 mg by mouth daily with dinner  , Disp: , Rfl:     promethazine (PHENERGAN) 25 mg tablet, Take 1 tablet (25 mg total) by mouth every 8 (eight) hours as needed for nausea or vomiting, Disp: 30 tablet, Rfl: 0    pyridoxine (VITAMIN B6) 100 mg tablet, Take 100 mg by mouth daily, Disp: , Rfl:     zolpidem (AMBIEN) 5 mg tablet, Take 1 tablet (5 mg total) by mouth daily at bedtime as needed for sleep, Disp: 30 tablet, Rfl: 3      Review of Systems   Constitutional: Positive for activity change, appetite change and fatigue  Negative for chills and fever  HENT: Negative  Eyes: Negative  Respiratory: Positive for chest tightness and shortness of breath  Negative for cough and wheezing  Cardiovascular: Negative for chest pain, palpitations and leg swelling  Gastrointestinal: Negative  Endocrine: Negative for polydipsia and polyuria  Genitourinary: Negative  Musculoskeletal: Negative  Skin: Positive for pallor  Neurological: Positive for dizziness, weakness and light-headedness  Psychiatric/Behavioral: Positive for confusion and dysphoric mood  Negative for sleep disturbance  The patient is not nervous/anxious  Objective:    /60   Pulse 102   Temp 98 4 °F (36 9 °C)   Ht 5' (1 524 m)   Wt 82 1 kg (181 lb)   LMP  (LMP Unknown)   BMI 35 35 kg/m² (Reviewed)     Physical Exam   Constitutional: She is oriented to person, place, and time  Vital signs are normal  She appears well-developed and well-nourished  No distress  HENT:   Head: Normocephalic and atraumatic  Mouth/Throat: Uvula is midline and oropharynx is clear and moist  Mucous membranes are pale  Eyes: Pupils are equal, round, and reactive to light  Conjunctivae, EOM and lids are normal    Neck: Trachea normal  Neck supple  No thyromegaly present  Cardiovascular: Normal rate, regular rhythm and normal heart sounds  No murmur heard  Pulses:       Dorsalis pedis pulses are 2+ on the right side, and 2+ on the left side  Pulmonary/Chest: Effort normal and breath sounds normal    Abdominal: Soft  Bowel sounds are normal  She exhibits no distension  There is no tenderness  Lymphadenopathy:     She has no cervical adenopathy  Neurological: She is alert and oriented to person, place, and time  She exhibits abnormal muscle tone  Skin: Skin is warm and dry  Capillary refill takes less than 2 seconds  There is pallor  Psychiatric: She has a normal mood and affect   Her speech is normal and behavior is normal

## 2019-12-06 ENCOUNTER — HOSPITAL ENCOUNTER (OUTPATIENT)
Dept: INFUSION CENTER | Facility: HOSPITAL | Age: 74
Discharge: HOME | End: 2019-12-06
Payer: COMMERCIAL

## 2019-12-06 VITALS
HEART RATE: 77 BPM | RESPIRATION RATE: 18 BRPM | TEMPERATURE: 97.9 F | SYSTOLIC BLOOD PRESSURE: 126 MMHG | DIASTOLIC BLOOD PRESSURE: 60 MMHG

## 2019-12-06 DIAGNOSIS — M19.90 OSTEOARTHRITIS, UNSPECIFIED OSTEOARTHRITIS TYPE, UNSPECIFIED SITE: ICD-10-CM

## 2019-12-06 DIAGNOSIS — D50.8 IRON DEFICIENCY ANEMIA SECONDARY TO INADEQUATE DIETARY IRON INTAKE: Primary | ICD-10-CM

## 2019-12-06 PROCEDURE — 86923 COMPATIBILITY TEST ELECTRIC: CPT

## 2019-12-06 PROCEDURE — 36430 TRANSFUSION BLD/BLD COMPNT: CPT

## 2019-12-06 PROCEDURE — P9016 RBC LEUKOCYTES REDUCED: HCPCS

## 2019-12-06 RX ORDER — SODIUM CHLORIDE 9 MG/ML
20 INJECTION, SOLUTION INTRAVENOUS ONCE
Status: DISCONTINUED | OUTPATIENT
Start: 2019-12-06 | End: 2019-12-09 | Stop reason: HOSPADM

## 2019-12-06 RX ORDER — MELOXICAM 15 MG/1
15 TABLET ORAL DAILY
Qty: 30 TABLET | Refills: 3 | Status: SHIPPED | OUTPATIENT
Start: 2019-12-06 | End: 2020-03-09

## 2019-12-06 NOTE — PLAN OF CARE
Problem: Potential for Falls  Goal: Patient will remain free of falls  Description  INTERVENTIONS:  - Assess patient frequently for physical needs  -  Identify cognitive and physical deficits and behaviors that affect risk of falls    -  [place] fall precautions as indicated by assessment   - Educate patient/family on patient safety including physical limitations  - Instruct patient to call for assistance with activity based on assessment  - Modify environment to reduce risk of injury  - Consider OT/PT consult to assist with strengthening/mobility  Outcome: Progressing

## 2019-12-07 LAB
ABO GROUP BLD BPU: NORMAL
ABO GROUP BLD BPU: NORMAL
BPU ID: NORMAL
BPU ID: NORMAL
CROSSMATCH: NORMAL
CROSSMATCH: NORMAL
UNIT DISPENSE STATUS: NORMAL
UNIT DISPENSE STATUS: NORMAL
UNIT PRODUCT CODE: NORMAL
UNIT PRODUCT CODE: NORMAL
UNIT RH: NORMAL
UNIT RH: NORMAL

## 2019-12-10 DIAGNOSIS — J01.90 ACUTE SINUSITIS, RECURRENCE NOT SPECIFIED, UNSPECIFIED LOCATION: Primary | ICD-10-CM

## 2019-12-10 RX ORDER — LEVOFLOXACIN 500 MG/1
500 TABLET, FILM COATED ORAL EVERY 24 HOURS
Qty: 10 TABLET | Refills: 0 | Status: SHIPPED | OUTPATIENT
Start: 2019-12-10 | End: 2019-12-20

## 2019-12-13 ENCOUNTER — APPOINTMENT (OUTPATIENT)
Dept: LAB | Facility: CLINIC | Age: 74
End: 2019-12-13
Payer: COMMERCIAL

## 2019-12-13 DIAGNOSIS — D50.8 IRON DEFICIENCY ANEMIA SECONDARY TO INADEQUATE DIETARY IRON INTAKE: ICD-10-CM

## 2019-12-13 LAB
BASOPHILS # BLD AUTO: 0.05 THOUSANDS/ΜL (ref 0–0.1)
BASOPHILS NFR BLD AUTO: 1 % (ref 0–1)
EOSINOPHIL # BLD AUTO: 0.32 THOUSAND/ΜL (ref 0–0.61)
EOSINOPHIL NFR BLD AUTO: 4 % (ref 0–6)
ERYTHROCYTE [DISTWIDTH] IN BLOOD BY AUTOMATED COUNT: 21.8 % (ref 11.6–15.1)
HCT VFR BLD AUTO: 32.8 % (ref 34.8–46.1)
HGB BLD-MCNC: 9.5 G/DL (ref 11.5–15.4)
IMM GRANULOCYTES # BLD AUTO: 0.03 THOUSAND/UL (ref 0–0.2)
IMM GRANULOCYTES NFR BLD AUTO: 0 % (ref 0–2)
LYMPHOCYTES # BLD AUTO: 1.12 THOUSANDS/ΜL (ref 0.6–4.47)
LYMPHOCYTES NFR BLD AUTO: 15 % (ref 14–44)
MCH RBC QN AUTO: 23.1 PG (ref 26.8–34.3)
MCHC RBC AUTO-ENTMCNC: 29 G/DL (ref 31.4–37.4)
MCV RBC AUTO: 80 FL (ref 82–98)
MONOCYTES # BLD AUTO: 0.56 THOUSAND/ΜL (ref 0.17–1.22)
MONOCYTES NFR BLD AUTO: 7 % (ref 4–12)
NEUTROPHILS # BLD AUTO: 5.66 THOUSANDS/ΜL (ref 1.85–7.62)
NEUTS SEG NFR BLD AUTO: 73 % (ref 43–75)
NRBC BLD AUTO-RTO: 0 /100 WBCS
PLATELET # BLD AUTO: 284 THOUSANDS/UL (ref 149–390)
PMV BLD AUTO: 9.5 FL (ref 8.9–12.7)
RBC # BLD AUTO: 4.12 MILLION/UL (ref 3.81–5.12)
WBC # BLD AUTO: 7.74 THOUSAND/UL (ref 4.31–10.16)

## 2019-12-13 PROCEDURE — 36415 COLL VENOUS BLD VENIPUNCTURE: CPT

## 2019-12-13 PROCEDURE — 85025 COMPLETE CBC W/AUTO DIFF WBC: CPT

## 2019-12-18 DIAGNOSIS — E78.00 HYPERCHOLESTEROLEMIA: Primary | ICD-10-CM

## 2019-12-18 DIAGNOSIS — K31.84 GASTROPARESIS: ICD-10-CM

## 2019-12-18 RX ORDER — METOCLOPRAMIDE 5 MG/1
5 TABLET ORAL 3 TIMES DAILY
Qty: 90 TABLET | Refills: 3 | Status: SHIPPED | OUTPATIENT
Start: 2019-12-18 | End: 2020-06-16 | Stop reason: SDUPTHER

## 2019-12-18 RX ORDER — PRAVASTATIN SODIUM 10 MG
10 TABLET ORAL
Qty: 30 TABLET | Refills: 5 | Status: SHIPPED | OUTPATIENT
Start: 2019-12-18 | End: 2020-01-10 | Stop reason: SDUPTHER

## 2019-12-19 DIAGNOSIS — J01.90 ACUTE SINUSITIS, RECURRENCE NOT SPECIFIED, UNSPECIFIED LOCATION: Primary | ICD-10-CM

## 2019-12-19 RX ORDER — CEPHALEXIN 500 MG/1
500 CAPSULE ORAL EVERY 8 HOURS SCHEDULED
Qty: 40 CAPSULE | Refills: 0 | Status: SHIPPED | OUTPATIENT
Start: 2019-12-19 | End: 2020-01-13 | Stop reason: ALTCHOICE

## 2019-12-23 DIAGNOSIS — J01.90 ACUTE SINUSITIS, RECURRENCE NOT SPECIFIED, UNSPECIFIED LOCATION: Primary | ICD-10-CM

## 2019-12-23 RX ORDER — DOXYCYCLINE HYCLATE 100 MG/1
100 CAPSULE ORAL EVERY 12 HOURS SCHEDULED
Qty: 20 CAPSULE | Refills: 0 | Status: SHIPPED | OUTPATIENT
Start: 2019-12-23 | End: 2020-01-02

## 2020-01-06 DIAGNOSIS — M54.50 LUMBAR BACK PAIN: ICD-10-CM

## 2020-01-06 DIAGNOSIS — M96.1 POST LAMINECTOMY SYNDROME: ICD-10-CM

## 2020-01-06 RX ORDER — OXYCODONE AND ACETAMINOPHEN 10; 325 MG/1; MG/1
1 TABLET ORAL EVERY 6 HOURS PRN
Qty: 120 TABLET | Refills: 0 | Status: SHIPPED | OUTPATIENT
Start: 2020-01-06 | End: 2020-01-13 | Stop reason: SDUPTHER

## 2020-01-10 DIAGNOSIS — M54.50 LUMBAR BACK PAIN: Primary | ICD-10-CM

## 2020-01-10 DIAGNOSIS — E78.00 HYPERCHOLESTEROLEMIA: ICD-10-CM

## 2020-01-10 RX ORDER — BACLOFEN 10 MG/1
10 TABLET ORAL 3 TIMES DAILY PRN
Qty: 90 TABLET | Refills: 3 | Status: SHIPPED | OUTPATIENT
Start: 2020-01-10 | End: 2020-03-25 | Stop reason: ALTCHOICE

## 2020-01-10 RX ORDER — PRAVASTATIN SODIUM 10 MG
10 TABLET ORAL
Qty: 90 TABLET | Refills: 5 | Status: SHIPPED | OUTPATIENT
Start: 2020-01-10 | End: 2020-05-26 | Stop reason: SDUPTHER

## 2020-01-13 ENCOUNTER — OFFICE VISIT (OUTPATIENT)
Dept: FAMILY MEDICINE CLINIC | Facility: CLINIC | Age: 75
End: 2020-01-13

## 2020-01-13 VITALS
DIASTOLIC BLOOD PRESSURE: 68 MMHG | TEMPERATURE: 97.4 F | WEIGHT: 183.6 LBS | HEART RATE: 88 BPM | BODY MASS INDEX: 36.05 KG/M2 | SYSTOLIC BLOOD PRESSURE: 138 MMHG | HEIGHT: 60 IN

## 2020-01-13 DIAGNOSIS — M96.1 POST LAMINECTOMY SYNDROME: ICD-10-CM

## 2020-01-13 DIAGNOSIS — E11.9 TYPE 2 DIABETES MELLITUS WITHOUT COMPLICATION, WITHOUT LONG-TERM CURRENT USE OF INSULIN (HCC): ICD-10-CM

## 2020-01-13 DIAGNOSIS — M54.50 LUMBAR BACK PAIN: ICD-10-CM

## 2020-01-13 DIAGNOSIS — R60.0 LOCALIZED EDEMA: ICD-10-CM

## 2020-01-13 DIAGNOSIS — D50.8 IRON DEFICIENCY ANEMIA SECONDARY TO INADEQUATE DIETARY IRON INTAKE: Primary | ICD-10-CM

## 2020-01-13 LAB — SL AMB POCT HEMOGLOBIN AIC: 6 (ref ?–6.5)

## 2020-01-13 PROCEDURE — RECHECK: Performed by: NURSE PRACTITIONER

## 2020-01-13 RX ORDER — OXYCODONE AND ACETAMINOPHEN 10; 325 MG/1; MG/1
1 TABLET ORAL EVERY 6 HOURS PRN
Qty: 120 TABLET | Refills: 0 | Status: CANCELLED | OUTPATIENT
Start: 2020-01-13

## 2020-01-13 RX ORDER — OXYCODONE AND ACETAMINOPHEN 10; 325 MG/1; MG/1
1 TABLET ORAL EVERY 6 HOURS PRN
Qty: 120 TABLET | Refills: 0 | Status: SHIPPED | OUTPATIENT
Start: 2020-01-13 | End: 2020-01-15 | Stop reason: SDUPTHER

## 2020-01-13 NOTE — PROGRESS NOTES
Assessment/Plan:       Diagnoses and all orders for this visit:    Iron deficiency anemia secondary to inadequate dietary iron intake  -     CBC and differential; Future  -     TIBC; Future  -     Ferritin; Future    Localized edema  -     hydrochlorothiazide (HYDRODIURIL) 12 5 mg tablet; Take 1 tablet (12 5 mg total) by mouth daily as needed (edema/swelling)    Type 2 diabetes mellitus without complication, without long-term current use of insulin (HCC)  -     POCT hemoglobin A1c  -     Comprehensive metabolic panel; Future    Post laminectomy syndrome  - OxyCODONE-acetaminophen (PERCOCET)  mg per tablet; Take 1 tablet by mouth every 6 (six) hours as needed for severe painMax Daily Amount: 4 tablets    #1 Iron deficiency anemia s/s inadequate dietary intake  Discussed with patient plan to have her add on vitamin C to supplements to aid with iron absorption  Discussed with patient dietary needs to obtain 8-10 mg of iron from diet daily and given a list of foods and amount of iron in each  Reinforced with patient need to eat at least three meals a day  Will obtain repeat lab work to check iron levles  #2 Localized edema  Discussed with patient plan to treat with hydrochlorothiazide 12 5 mg daily as needed but will wait for chemistry labs to evaluate renal function  #3 Type 2 diabetes mellitus  Point of care hemoglobin A1c was 6 0 today  #4 Postlaminectomy syndrome  Discussed with patient plan to continue baclofen and oxycodone-APAP   Patient will continue to get more physical activity to improve balance  Patient to return in 3 months for routine follow -up or sooner if needed  Patient instructed to call for problems or concerns in the interim    Subjective:      Patient ID: Sandra Soni is a 66 y o  female  66 y  o female presenting for one month follow up for iron deficiency anemia requiring a blood transfusion   Patient had originally a hemoglobin of 6 3 on 12/05/2019 and after 2 units of blood patient's hemoglobin increased to 9 5 on 12/13/2019  After blood transfusion family reports that patient became more oriented and had more energy  Patient has since moved into a new apartment with her son and reports that she is more active than previous  Patient also reports that she is trying to consume more iron rich foods in her diet, but does state that she skips meals at times  She states she only eats when she is hungry so, so some meals are missed  Today she presents with swollen ankles and reported left hearing loss  Patient also is slightly emotional talking about the situation that led to her recent relocation and family discord  Patient reports that she lives near a [employer] and would like to start this week going for some exercises to rebuild her strength since her back surgery last year  Patient reports her asthma is controlled with as needed use of nebulizer treatments every 6 hours  Family History   Problem Relation Age of Onset    Lung cancer Mother 55    Pulmonary embolism Father     Stroke Maternal Grandmother     Heart attack Maternal Grandfather     No Known Problems Paternal Grandmother     No Known Problems Paternal Grandfather     Diabetes Family         Diabetes mellitus    Hypertension Family     Stroke Family         Stroke complications    No Known Problems Daughter     No Known Problems Daughter     No Known Problems Sister     No Known Problems Maternal Aunt      Social History     Socioeconomic History    Marital status:       Spouse name: Not on file    Number of children: Not on file    Years of education: Not on file    Highest education level: Not on file   Occupational History    Occupation: Retired   Social Needs    Financial resource strain: Not on file    Food insecurity:     Worry: Not on file     Inability: Not on file   Giv.to needs:     Medical: Not on file     Non-medical: Not on file   Tobacco Use    Smoking status: Former Smoker     Last attempt to quit: 1970     Years since quittin 0    Smokeless tobacco: Never Used    Tobacco comment: Denied history of current ever day smoker, Former smoker and Never smoker all documented in [employer]   Substance and Sexual Activity    Alcohol use: No     Comment: Denied history of alcohol use    Drug use: No     Comment: Denied history of drug use    Sexual activity: Not Currently   Lifestyle    Physical activity:     Days per week: Not on file     Minutes per session: Not on file    Stress: Not on file   Relationships    Social connections:     Talks on phone: Not on file     Gets together: Not on file     Attends Jewish service: Not on file     Active member of club or organization: Not on file     Attends meetings of clubs or organizations: Not on file     Relationship status: Not on file    Intimate partner violence:     Fear of current or ex partner: Not on file     Emotionally abused: Not on file     Physically abused: Not on file     Forced sexual activity: Not on file   Other Topics Concern    Not on file   Social History Narrative    Marital History - Currently  per [employer]     Past Medical History:   Diagnosis Date    Acid reflux     Anxiety     Arthritis     Asthma     Basal cell carcinoma     upper lip    [facility] narcotic dependence ([facility] )     Chronic pain     Colon polyp     Cystocele     Diabetes mellitus ([facility] )     Diverticulosis     Dysfunctional uterine bleeding     last assessed - [id number]    Fibromyalgia     Gastric ulcer     Gastroparesis     History of colonic polyps     last assessed - [id number]    History of gastroesophageal reflux (GERD)     Hypercholesterolemia     Hyperlipidemia     Hypertension     IBS (irritable bowel syndrome)     Kidney stone     Post laminectomy syndrome     Seasonal allergies     Spinal stenosis      Past Surgical History:   Procedure Laterality Date    APPENDECTOMY      BACK SURGERY  BREAST CYST ASPIRATION Left     pt unsure what exactly was removed    CHOLECYSTECTOMY      ESOPHAGOGASTRODUODENOSCOPY N/A 9/28/2016    Procedure: ESOPHAGOGASTRODUODENOSCOPY (EGD); Surgeon: Mikel Shaver MD;  [facility]; Service:     HERNIA REPAIR      HYSTERECTOMY      TTAH-BSO age 25   Anuj Moose LAMINECTOMY      LUMBAR LAMINECTOMY      OOPHORECTOMY      age 25   Anuj Moose UT ARTHRODESIS POSTERIOR/POSTEROLATERAL THORACIC N/A 6/4/2018    Procedure: Reopening of lumbar incision for T12-L5 posterior instrumented fixation and fusion and T12-L4 posterior decompression;  Surgeon: Ashly Sebastian MD;  [facility];  Service: Neurosurgery    UT COLONOSCOPY FLX DX W/COLLJ SPEC WHEN PFRMD N/A 3/2/2016    Procedure: EGD AND COLONOSCOPY;  Surgeon: Mikel Shaver MD;  [facility]; Service: Gastroenterology    UT DILATE ESOPHAGUS N/A 9/28/2016    Procedure: DILATATION ESOPHAGEAL;  Surgeon: Mikel Shaver MD;  [facility]; Service: Gastroenterology    UT ESOPHAGOGASTRODUODENOSCOPY TRANSORAL DIAGNOSTIC N/A 7/18/2016    Procedure: ESOPHAGOGASTRODUODENOSCOPY (EGD); Surgeon: Mikel Shaver MD;  [facility];   Service: Gastroenterology    UT IMPLANT SPINAL NEUROSTIM/ Left 6/4/2018    Procedure: removal of left buttock implantable pulse generator and placement of new  implantable pulse generator;  Surgeon: Ashly Sebastian MD;  [facility];  Service: Neurosurgery     Allergies   Allergen Reactions    Penicillins Anaphylaxis and Other (See Comments)     Other reaction(s): Unknown Reaction    Sulfa Antibiotics Anaphylaxis and Other (See Comments)     Other reaction(s): Unknown Reaction    Aspartame Other (See Comments) and Hypertension     Slurred speech, weakness, stroke sx    Iodinated Diagnostic Agents Hives     Pt has taken prep prior for contrast and has not had any break through reaction    Keflex [Cephalexin]        Current Outpatient Medications:     baclofen 10 mg tablet, Take 1 tablet (10 mg total) by mouth 3 (three) times a day as needed for muscle spasms, Disp: 90 tablet, Rfl: 3    glucose blood test strip, Bid testing, Disp: 100 each, Rfl: 6    guaiFENesin 400 mg, Take 400 mg by mouth daily as needed for cough, Disp: , Rfl:     IRON PO, Take 65 mg by mouth 2 (two) times a day  , Disp: , Rfl:     levothyroxine 25 mcg tablet, Take 25 mcg by mouth daily, Disp: , Rfl:     loratadine (CLARITIN) 10 mg tablet, Take 10 mg by mouth daily as needed for allergies, Disp: , Rfl:     Lutein 10 MG TABS, Take 10 mg by mouth daily  , Disp: , Rfl:     metoclopramide (REGLAN) 5 mg tablet, Take 1 tablet (5 mg total) by mouth 3 (three) times a day, Disp: 90 tablet, Rfl: 3    Milnacipran HCl (SAVELLA) 100 MG TABS, Take 1 tablet (100 mg total) by mouth daily, Disp: 30 tablet, Rfl: 3    nitrofurantoin (MACRODANTIN) 50 mg capsule, Take 1 capsule (50 mg total) by mouth daily at bedtime, Disp: 30 capsule, Rfl: 3    oxyCODONE-acetaminophen (PERCOCET)  mg per tablet, Take 1 tablet by mouth every 6 (six) hours as needed for severe painMax Daily Amount: 4 tablets, Disp: 120 tablet, Rfl: 0    pravastatin (PRAVACHOL) 10 mg tablet, Take 1 tablet (10 mg total) by mouth daily at bedtime, Disp: 90 tablet, Rfl: 5    pyridoxine (VITAMIN B6) 100 mg tablet, Take 100 mg by mouth daily, Disp: , Rfl:     zolpidem (AMBIEN) 5 mg tablet, Take 1 tablet (5 mg total) by mouth daily at bedtime as needed for sleep, Disp: 30 tablet, Rfl: 3    buPROPion (WELLBUTRIN XL) 150 mg 24 hr tablet, Take 1 tablet (150 mg total) by mouth daily for 30 days, Disp: 30 tablet, Rfl: 3    dicyclomine (BENTYL) 10 mg capsule, Take 1 capsule (10 mg total) by mouth 3 (three) times a day as needed (colon spasms) for up to 30 days, Disp: 60 capsule, Rfl: 3    gabapentin (NEURONTIN) 300 mg capsule, Take 1 capsule (300 mg total) by mouth 2 (two) times a day for 30 days, Disp: 60 capsule, Rfl: 3    hyoscyamine (ANASPAZ) 0 125 mg, Take 1 tablet (0 125 mg total) by mouth every 4 (four) hours as needed (spasms) for up to 10 days, Disp: 40 tablet, Rfl: 1    latanoprost (XALATAN) 0 005 % ophthalmic solution, Administer 1 drop to both eyes daily at bedtime for 30 days, Disp: 2 5 mL, Rfl: 3    lisinopril (ZESTRIL) 20 mg tablet, Take 1 tablet (20 mg total) by mouth daily for 30 days, Disp: 30 tablet, Rfl: 3    meclizine (ANTIVERT) 25 mg tablet, Take 1 tablet (25 mg total) by mouth 2 (two) times a day for 30 days, Disp: 60 tablet, Rfl: 3    meloxicam (MOBIC) 15 mg tablet, Take 1 tablet (15 mg total) by mouth daily, Disp: 30 tablet, Rfl: 3    metFORMIN (GLUCOPHAGE) 1000 MG tablet, Take 1 tablet (1,000 mg total) by mouth 2 (two) times a day with meals for 30 days, Disp: 60 tablet, Rfl: 3    oxybutynin (DITROPAN XL) 15 MG 24 hr tablet, Take 1 tablet (15 mg total) by mouth daily for 30 days, Disp: 30 tablet, Rfl: 3    oxybutynin (DITROPAN) 5 mg tablet, Take 1 tablet (5 mg total) by mouth 3 (three) times a day for 30 days, Disp: 90 tablet, Rfl: 3    pantoprazole (PROTONIX) 40 mg tablet, Take 1 tablet (40 mg total) by mouth 2 (two) times daily after meals for 30 days, Disp: 60 tablet, Rfl: 3    Review of Systems   Constitutional: Negative  HENT: Positive for hearing loss ( left ear)  Negative for congestion, ear discharge, ear pain, postnasal drip, rhinorrhea, sinus pressure, sore throat, tinnitus, trouble swallowing and voice change  Eyes: Negative for visual disturbance  Respiratory: Negative for cough, chest tightness, shortness of breath and wheezing  Cardiovascular: Positive for leg swelling  Gastrointestinal: Negative for abdominal distention and abdominal pain  Endocrine: Negative for polydipsia and polyuria  Genitourinary: Negative  Musculoskeletal: Positive for myalgias  Allergic/Immunologic: Negative  Neurological: Negative for dizziness, weakness, light-headedness and headaches  Hematological: Negative  Psychiatric/Behavioral: Positive for dysphoric mood  Negative for sleep disturbance  The patient is not nervous/anxious  Objective:    /68 (BP Location: Left arm, Patient Position: Sitting, Cuff Size: Adult)   Pulse 88   Temp (!) 97 4 °F (36 3 °C) (Tympanic)   Ht 5' (1 524 m)   Wt 83 3 kg (183 lb 9 6 oz)   LMP  (LMP Unknown)   BMI 35 86 kg/m² (Reviewed)     Physical Exam   Constitutional: She is oriented to person, place, and time  Vital signs are normal  She appears well-developed and well-nourished  No distress  HENT:   Head: Normocephalic and atraumatic  Right Ear: Tympanic membrane, external ear and ear canal normal    Left Ear: Tympanic membrane, external ear and ear canal normal    Mouth/Throat: Mucous membranes are normal    Eyes: Pupils are equal, round, and reactive to light  Conjunctivae, EOM and lids are normal    Neck: Trachea normal  Neck supple  Carotid bruit is not present  Cardiovascular: Normal rate, regular rhythm, normal heart sounds and intact distal pulses  Pulses:       Popliteal pulses are 2+ on the right side, and 2+ on the left side  Dorsalis pedis pulses are 1+ on the right side, and 1+ on the left side  Posterior tibial pulses are 2+ on the right side, and 2+ on the left side  Pulmonary/Chest: Effort normal and breath sounds normal    Lymphadenopathy:     She has no cervical adenopathy  Neurological: She is alert and oriented to person, place, and time  She has normal strength  No cranial nerve deficit  Skin: Skin is warm and dry  Capillary refill takes less than 2 seconds  No cyanosis  Nails show no clubbing  Psychiatric: She has a normal mood and affect  Her speech is normal and behavior is normal  Judgment and thought content normal  Cognition and memory are normal          BMI Counseling: There is no height or weight on file to calculate BMI   The BMI is above normal  Nutrition recommendations include decreasing portion sizes, encouraging healthy choices of fruits and vegetables, consuming healthier snacks, moderation in carbohydrate intake and increasing intake of lean protein  Exercise recommendations include exercising 3-5 times per week and strength training exercises

## 2020-01-14 RX ORDER — HYDROCHLOROTHIAZIDE 12.5 MG/1
12.5 TABLET ORAL DAILY PRN
Qty: 30 TABLET | Refills: 3 | Status: SHIPPED | OUTPATIENT
Start: 2020-01-14 | End: 2020-09-21 | Stop reason: ALTCHOICE

## 2020-01-15 ENCOUNTER — APPOINTMENT (OUTPATIENT)
Dept: LAB | Facility: CLINIC | Age: 75
End: 2020-01-15
Payer: COMMERCIAL

## 2020-01-15 ENCOUNTER — TRANSCRIBE ORDERS (OUTPATIENT)
Dept: LAB | Facility: CLINIC | Age: 75
End: 2020-01-15

## 2020-01-15 DIAGNOSIS — N39.0 URINARY TRACT INFECTION WITHOUT HEMATURIA, SITE UNSPECIFIED: Primary | ICD-10-CM

## 2020-01-15 DIAGNOSIS — D50.8 IRON DEFICIENCY ANEMIA SECONDARY TO INADEQUATE DIETARY IRON INTAKE: ICD-10-CM

## 2020-01-15 DIAGNOSIS — E11.9 TYPE 2 DIABETES MELLITUS WITHOUT COMPLICATION, WITHOUT LONG-TERM CURRENT USE OF INSULIN (HCC): ICD-10-CM

## 2020-01-15 DIAGNOSIS — M96.1 POST LAMINECTOMY SYNDROME: ICD-10-CM

## 2020-01-15 DIAGNOSIS — M54.50 LUMBAR BACK PAIN: ICD-10-CM

## 2020-01-15 LAB
ALBUMIN SERPL BCP-MCNC: 3.6 G/DL (ref 3.5–5)
ALP SERPL-CCNC: 111 U/L (ref 46–116)
ALT SERPL W P-5'-P-CCNC: 14 U/L (ref 12–78)
ANION GAP SERPL CALCULATED.3IONS-SCNC: 9 MMOL/L (ref 4–13)
AST SERPL W P-5'-P-CCNC: 19 U/L (ref 5–45)
BASOPHILS # BLD AUTO: 0.05 THOUSANDS/ΜL (ref 0–0.1)
BASOPHILS NFR BLD AUTO: 1 % (ref 0–1)
BILIRUB SERPL-MCNC: 0.19 MG/DL (ref 0.2–1)
BUN SERPL-MCNC: 14 MG/DL (ref 5–25)
CALCIUM SERPL-MCNC: 9.6 MG/DL (ref 8.3–10.1)
CHLORIDE SERPL-SCNC: 103 MMOL/L (ref 100–108)
CO2 SERPL-SCNC: 28 MMOL/L (ref 21–32)
CREAT SERPL-MCNC: 0.98 MG/DL (ref 0.6–1.3)
EOSINOPHIL # BLD AUTO: 0.12 THOUSAND/ΜL (ref 0–0.61)
EOSINOPHIL NFR BLD AUTO: 2 % (ref 0–6)
ERYTHROCYTE [DISTWIDTH] IN BLOOD BY AUTOMATED COUNT: 21.2 % (ref 11.6–15.1)
FERRITIN SERPL-MCNC: 22 NG/ML (ref 8–388)
GFR SERPL CREATININE-BSD FRML MDRD: 56 ML/MIN/1.73SQ M
GLUCOSE SERPL-MCNC: 152 MG/DL (ref 65–140)
HCT VFR BLD AUTO: 37.7 % (ref 34.8–46.1)
HGB BLD-MCNC: 11.1 G/DL (ref 11.5–15.4)
IMM GRANULOCYTES # BLD AUTO: 0.01 THOUSAND/UL (ref 0–0.2)
IMM GRANULOCYTES NFR BLD AUTO: 0 % (ref 0–2)
LYMPHOCYTES # BLD AUTO: 1.25 THOUSANDS/ΜL (ref 0.6–4.47)
LYMPHOCYTES NFR BLD AUTO: 18 % (ref 14–44)
MCH RBC QN AUTO: 23.7 PG (ref 26.8–34.3)
MCHC RBC AUTO-ENTMCNC: 29.4 G/DL (ref 31.4–37.4)
MCV RBC AUTO: 81 FL (ref 82–98)
MONOCYTES # BLD AUTO: 0.45 THOUSAND/ΜL (ref 0.17–1.22)
MONOCYTES NFR BLD AUTO: 7 % (ref 4–12)
NEUTROPHILS # BLD AUTO: 4.9 THOUSANDS/ΜL (ref 1.85–7.62)
NEUTS SEG NFR BLD AUTO: 72 % (ref 43–75)
NRBC BLD AUTO-RTO: 0 /100 WBCS
PLATELET # BLD AUTO: 249 THOUSANDS/UL (ref 149–390)
PMV BLD AUTO: 9.8 FL (ref 8.9–12.7)
POTASSIUM SERPL-SCNC: 3.6 MMOL/L (ref 3.5–5.3)
PROT SERPL-MCNC: 7.6 G/DL (ref 6.4–8.2)
RBC # BLD AUTO: 4.68 MILLION/UL (ref 3.81–5.12)
SODIUM SERPL-SCNC: 140 MMOL/L (ref 136–145)
TIBC SERPL-MCNC: 393 UG/DL (ref 250–450)
WBC # BLD AUTO: 6.78 THOUSAND/UL (ref 4.31–10.16)

## 2020-01-15 PROCEDURE — 83550 IRON BINDING TEST: CPT

## 2020-01-15 PROCEDURE — 82728 ASSAY OF FERRITIN: CPT

## 2020-01-15 PROCEDURE — 85025 COMPLETE CBC W/AUTO DIFF WBC: CPT

## 2020-01-15 PROCEDURE — 80053 COMPREHEN METABOLIC PANEL: CPT

## 2020-01-15 PROCEDURE — 36415 COLL VENOUS BLD VENIPUNCTURE: CPT

## 2020-01-15 RX ORDER — CIPROFLOXACIN 500 MG/1
500 TABLET, FILM COATED ORAL EVERY 12 HOURS SCHEDULED
Qty: 20 TABLET | Refills: 1 | Status: SHIPPED | OUTPATIENT
Start: 2020-01-15 | End: 2020-01-25

## 2020-01-15 RX ORDER — OXYCODONE AND ACETAMINOPHEN 10; 325 MG/1; MG/1
1 TABLET ORAL EVERY 6 HOURS PRN
Qty: 120 TABLET | Refills: 0 | Status: SHIPPED | OUTPATIENT
Start: 2020-01-15 | End: 2020-02-14 | Stop reason: SDUPTHER

## 2020-01-27 DIAGNOSIS — M79.7 FIBROMYALGIA: ICD-10-CM

## 2020-01-27 DIAGNOSIS — N39.0 URINARY TRACT INFECTION WITHOUT HEMATURIA, SITE UNSPECIFIED: Primary | ICD-10-CM

## 2020-01-27 DIAGNOSIS — H40.9 GLAUCOMA OF BOTH EYES, UNSPECIFIED GLAUCOMA TYPE: ICD-10-CM

## 2020-01-27 RX ORDER — DOXYCYCLINE HYCLATE 100 MG/1
100 CAPSULE ORAL EVERY 12 HOURS SCHEDULED
Qty: 20 CAPSULE | Refills: 0 | Status: SHIPPED | OUTPATIENT
Start: 2020-01-27 | End: 2020-02-06

## 2020-01-27 RX ORDER — LATANOPROST 50 UG/ML
1 SOLUTION/ DROPS OPHTHALMIC
Qty: 2.5 ML | Refills: 3 | Status: SHIPPED | OUTPATIENT
Start: 2020-01-27 | End: 2020-10-02 | Stop reason: SDUPTHER

## 2020-01-31 DIAGNOSIS — M48.061 SPINAL STENOSIS OF LUMBAR REGION, UNSPECIFIED WHETHER NEUROGENIC CLAUDICATION PRESENT: Primary | ICD-10-CM

## 2020-01-31 RX ORDER — METAXALONE 800 MG/1
800 TABLET ORAL 3 TIMES DAILY PRN
Qty: 90 TABLET | Refills: 3 | Status: SHIPPED | OUTPATIENT
Start: 2020-01-31 | End: 2020-03-25 | Stop reason: ALTCHOICE

## 2020-02-03 DIAGNOSIS — M48.062 LUMBAR STENOSIS WITH NEUROGENIC CLAUDICATION: ICD-10-CM

## 2020-02-04 RX ORDER — GABAPENTIN 300 MG/1
300 CAPSULE ORAL 2 TIMES DAILY
Qty: 60 CAPSULE | Refills: 3 | Status: SHIPPED | OUTPATIENT
Start: 2020-02-04 | End: 2020-02-13 | Stop reason: SDUPTHER

## 2020-02-13 DIAGNOSIS — M48.062 LUMBAR STENOSIS WITH NEUROGENIC CLAUDICATION: ICD-10-CM

## 2020-02-13 RX ORDER — GABAPENTIN 300 MG/1
300 CAPSULE ORAL 2 TIMES DAILY
Qty: 60 CAPSULE | Refills: 3 | Status: SHIPPED | OUTPATIENT
Start: 2020-02-13 | End: 2020-07-14 | Stop reason: SDUPTHER

## 2020-02-14 DIAGNOSIS — M54.50 LUMBAR BACK PAIN: ICD-10-CM

## 2020-02-14 DIAGNOSIS — M96.1 POST LAMINECTOMY SYNDROME: ICD-10-CM

## 2020-02-14 RX ORDER — OXYCODONE AND ACETAMINOPHEN 10; 325 MG/1; MG/1
1 TABLET ORAL EVERY 6 HOURS PRN
Qty: 100 TABLET | Refills: 0 | Status: SHIPPED | OUTPATIENT
Start: 2020-02-14 | End: 2020-03-12 | Stop reason: SDUPTHER

## 2020-03-04 DIAGNOSIS — K58.2 IRRITABLE BOWEL SYNDROME WITH BOTH CONSTIPATION AND DIARRHEA: ICD-10-CM

## 2020-03-04 DIAGNOSIS — E03.9 HYPOTHYROIDISM, UNSPECIFIED TYPE: Primary | ICD-10-CM

## 2020-03-04 RX ORDER — LEVOTHYROXINE SODIUM 0.03 MG/1
25 TABLET ORAL DAILY
Qty: 30 TABLET | Refills: 3 | Status: SHIPPED | OUTPATIENT
Start: 2020-03-04 | End: 2020-06-23 | Stop reason: SDUPTHER

## 2020-03-04 RX ORDER — DICYCLOMINE HYDROCHLORIDE 10 MG/1
10 CAPSULE ORAL 3 TIMES DAILY PRN
Qty: 60 CAPSULE | Refills: 3 | Status: SHIPPED | OUTPATIENT
Start: 2020-03-04 | End: 2020-09-03 | Stop reason: SDUPTHER

## 2020-03-04 RX ORDER — DICYCLOMINE HYDROCHLORIDE 10 MG/1
10 CAPSULE ORAL 3 TIMES DAILY PRN
Qty: 60 CAPSULE | Refills: 3 | Status: SHIPPED | OUTPATIENT
Start: 2020-03-04 | End: 2020-03-04 | Stop reason: SDUPTHER

## 2020-03-09 ENCOUNTER — TELEPHONE (OUTPATIENT)
Dept: FAMILY MEDICINE CLINIC | Facility: CLINIC | Age: 75
End: 2020-03-09

## 2020-03-09 DIAGNOSIS — M54.50 LUMBAR BACK PAIN: Primary | ICD-10-CM

## 2020-03-09 RX ORDER — MELOXICAM 15 MG/1
15 TABLET ORAL DAILY
Qty: 30 TABLET | Refills: 5 | Status: SHIPPED | OUTPATIENT
Start: 2020-03-09 | End: 2020-03-09

## 2020-03-09 RX ORDER — CYCLOBENZAPRINE HCL 10 MG
10 TABLET ORAL
Qty: 3 TABLET | Refills: 1 | Status: SHIPPED | OUTPATIENT
Start: 2020-03-09 | End: 2020-03-25 | Stop reason: SDUPTHER

## 2020-03-09 RX ORDER — MELOXICAM 15 MG/1
15 TABLET ORAL DAILY
Qty: 30 TABLET | Refills: 5 | Status: SHIPPED | OUTPATIENT
Start: 2020-03-09 | End: 2020-04-06 | Stop reason: SDUPTHER

## 2020-03-11 DIAGNOSIS — J45.20 MILD INTERMITTENT ASTHMA WITHOUT COMPLICATION: ICD-10-CM

## 2020-03-11 DIAGNOSIS — J45.20 MILD INTERMITTENT ASTHMA WITHOUT COMPLICATION: Primary | ICD-10-CM

## 2020-03-11 RX ORDER — ALBUTEROL SULFATE 2.5 MG/3ML
2.5 SOLUTION RESPIRATORY (INHALATION) EVERY 6 HOURS PRN
Qty: 12 VIAL | Refills: 3 | Status: SHIPPED | OUTPATIENT
Start: 2020-03-11 | End: 2020-03-11 | Stop reason: SDUPTHER

## 2020-03-11 RX ORDER — ALBUTEROL SULFATE 2.5 MG/3ML
2.5 SOLUTION RESPIRATORY (INHALATION) EVERY 6 HOURS PRN
Qty: 25 VIAL | Refills: 3 | Status: SHIPPED | OUTPATIENT
Start: 2020-03-11 | End: 2020-04-10

## 2020-03-12 DIAGNOSIS — N39.0 URINARY TRACT INFECTION WITHOUT HEMATURIA, SITE UNSPECIFIED: Primary | ICD-10-CM

## 2020-03-12 DIAGNOSIS — M96.1 POST LAMINECTOMY SYNDROME: ICD-10-CM

## 2020-03-12 DIAGNOSIS — R42 VERTIGO: ICD-10-CM

## 2020-03-12 DIAGNOSIS — M54.50 LUMBAR BACK PAIN: ICD-10-CM

## 2020-03-12 RX ORDER — OXYCODONE AND ACETAMINOPHEN 10; 325 MG/1; MG/1
1 TABLET ORAL EVERY 6 HOURS PRN
Qty: 100 TABLET | Refills: 0 | Status: SHIPPED | OUTPATIENT
Start: 2020-03-12 | End: 2020-04-10 | Stop reason: SDUPTHER

## 2020-03-12 RX ORDER — MECLIZINE HYDROCHLORIDE 25 MG/1
25 TABLET ORAL 2 TIMES DAILY
Qty: 60 TABLET | Refills: 3 | Status: SHIPPED | OUTPATIENT
Start: 2020-03-12 | End: 2020-10-22 | Stop reason: SDUPTHER

## 2020-03-17 ENCOUNTER — APPOINTMENT (OUTPATIENT)
Dept: LAB | Facility: CLINIC | Age: 75
End: 2020-03-17
Payer: COMMERCIAL

## 2020-03-17 DIAGNOSIS — N39.0 URINARY TRACT INFECTION WITHOUT HEMATURIA, SITE UNSPECIFIED: ICD-10-CM

## 2020-03-17 PROCEDURE — 87086 URINE CULTURE/COLONY COUNT: CPT

## 2020-03-18 LAB — BACTERIA UR CULT: NORMAL

## 2020-03-25 DIAGNOSIS — R60.0 LOCALIZED EDEMA: Primary | ICD-10-CM

## 2020-03-25 DIAGNOSIS — M54.50 LUMBAR BACK PAIN: ICD-10-CM

## 2020-03-25 RX ORDER — CYCLOBENZAPRINE HCL 10 MG
10 TABLET ORAL
Qty: 3 TABLET | Refills: 1 | Status: SHIPPED | OUTPATIENT
Start: 2020-03-25 | End: 2020-04-14

## 2020-03-25 RX ORDER — POTASSIUM CHLORIDE 20 MEQ/1
20 TABLET, EXTENDED RELEASE ORAL DAILY PRN
Qty: 30 TABLET | Refills: 2 | Status: SHIPPED | OUTPATIENT
Start: 2020-03-25 | End: 2020-09-21 | Stop reason: ALTCHOICE

## 2020-03-25 RX ORDER — FUROSEMIDE 20 MG/1
20 TABLET ORAL DAILY PRN
Qty: 30 TABLET | Refills: 2 | Status: SHIPPED | OUTPATIENT
Start: 2020-03-25 | End: 2020-09-21 | Stop reason: ALTCHOICE

## 2020-04-06 ENCOUNTER — TELEPHONE (OUTPATIENT)
Dept: FAMILY MEDICINE CLINIC | Facility: CLINIC | Age: 75
End: 2020-04-06

## 2020-04-06 DIAGNOSIS — M48.061 SPINAL STENOSIS OF LUMBAR REGION, UNSPECIFIED WHETHER NEUROGENIC CLAUDICATION PRESENT: ICD-10-CM

## 2020-04-06 DIAGNOSIS — M54.50 LUMBAR BACK PAIN: ICD-10-CM

## 2020-04-06 RX ORDER — MELOXICAM 15 MG/1
15 TABLET ORAL DAILY
Qty: 30 TABLET | Refills: 5 | Status: SHIPPED | OUTPATIENT
Start: 2020-04-06 | End: 2020-07-17 | Stop reason: HOSPADM

## 2020-04-06 RX ORDER — NITROFURANTOIN MACROCRYSTALS 50 MG/1
50 CAPSULE ORAL
Qty: 30 CAPSULE | Refills: 3 | Status: SHIPPED | OUTPATIENT
Start: 2020-04-06 | End: 2020-10-12 | Stop reason: SDUPTHER

## 2020-04-10 DIAGNOSIS — M96.1 POST LAMINECTOMY SYNDROME: ICD-10-CM

## 2020-04-10 DIAGNOSIS — M54.50 LUMBAR BACK PAIN: ICD-10-CM

## 2020-04-10 RX ORDER — OXYCODONE AND ACETAMINOPHEN 10; 325 MG/1; MG/1
1 TABLET ORAL EVERY 6 HOURS PRN
Qty: 100 TABLET | Refills: 0 | Status: SHIPPED | OUTPATIENT
Start: 2020-04-10 | End: 2020-05-08 | Stop reason: SDUPTHER

## 2020-04-11 DIAGNOSIS — M54.50 LUMBAR BACK PAIN: Primary | ICD-10-CM

## 2020-04-11 RX ORDER — CYCLOBENZAPRINE HCL 10 MG
10 TABLET ORAL
Qty: 30 TABLET | Refills: 3 | Status: SHIPPED | OUTPATIENT
Start: 2020-04-11 | End: 2020-07-01 | Stop reason: SDUPTHER

## 2020-05-08 DIAGNOSIS — M96.1 POST LAMINECTOMY SYNDROME: ICD-10-CM

## 2020-05-08 DIAGNOSIS — M54.50 LUMBAR BACK PAIN: ICD-10-CM

## 2020-05-08 RX ORDER — OXYCODONE AND ACETAMINOPHEN 10; 325 MG/1; MG/1
1 TABLET ORAL EVERY 6 HOURS PRN
Qty: 100 TABLET | Refills: 0 | Status: SHIPPED | OUTPATIENT
Start: 2020-05-08 | End: 2020-06-10 | Stop reason: SDUPTHER

## 2020-05-08 RX ORDER — BACLOFEN 10 MG/1
10 TABLET ORAL 3 TIMES DAILY PRN
Qty: 30 TABLET | Refills: 3 | Status: SHIPPED | OUTPATIENT
Start: 2020-05-08 | End: 2020-11-27 | Stop reason: SDUPTHER

## 2020-05-11 DIAGNOSIS — J45.20 MILD INTERMITTENT ASTHMA WITHOUT COMPLICATION: Primary | ICD-10-CM

## 2020-05-11 DIAGNOSIS — J06.9 UPPER RESPIRATORY TRACT INFECTION, UNSPECIFIED TYPE: ICD-10-CM

## 2020-05-11 RX ORDER — ALBUTEROL SULFATE 90 UG/1
2 AEROSOL, METERED RESPIRATORY (INHALATION) EVERY 6 HOURS PRN
Qty: 1 INHALER | Refills: 5 | Status: SHIPPED | OUTPATIENT
Start: 2020-05-11 | End: 2022-06-29 | Stop reason: SDUPTHER

## 2020-05-11 RX ORDER — AZITHROMYCIN 250 MG/1
TABLET, FILM COATED ORAL
Qty: 6 TABLET | Refills: 0 | Status: SHIPPED | OUTPATIENT
Start: 2020-05-11 | End: 2020-05-15

## 2020-05-19 DIAGNOSIS — M25.561 ACUTE PAIN OF RIGHT KNEE: Primary | ICD-10-CM

## 2020-05-19 DIAGNOSIS — N32.81 OVERACTIVE BLADDER: ICD-10-CM

## 2020-05-19 RX ORDER — OXYBUTYNIN CHLORIDE 5 MG/1
5 TABLET ORAL 3 TIMES DAILY
Qty: 90 TABLET | Refills: 3 | Status: SHIPPED | OUTPATIENT
Start: 2020-05-19 | End: 2020-12-30 | Stop reason: SDUPTHER

## 2020-05-22 ENCOUNTER — APPOINTMENT (OUTPATIENT)
Dept: RADIOLOGY | Age: 75
End: 2020-05-22
Payer: COMMERCIAL

## 2020-05-22 DIAGNOSIS — M25.561 ACUTE PAIN OF RIGHT KNEE: ICD-10-CM

## 2020-05-22 PROCEDURE — 73562 X-RAY EXAM OF KNEE 3: CPT

## 2020-05-26 DIAGNOSIS — I10 ESSENTIAL HYPERTENSION: ICD-10-CM

## 2020-05-26 DIAGNOSIS — E78.00 HYPERCHOLESTEROLEMIA: ICD-10-CM

## 2020-05-26 RX ORDER — LISINOPRIL 20 MG/1
20 TABLET ORAL DAILY
Qty: 30 TABLET | Refills: 3 | Status: SHIPPED | OUTPATIENT
Start: 2020-05-26 | End: 2021-02-19 | Stop reason: SDUPTHER

## 2020-05-26 RX ORDER — PRAVASTATIN SODIUM 10 MG
10 TABLET ORAL
Qty: 90 TABLET | Refills: 5 | Status: SHIPPED | OUTPATIENT
Start: 2020-05-26 | End: 2022-03-04 | Stop reason: SDUPTHER

## 2020-05-27 DIAGNOSIS — M25.561 ACUTE PAIN OF RIGHT KNEE: Primary | ICD-10-CM

## 2020-06-05 ENCOUNTER — HOSPITAL ENCOUNTER (OUTPATIENT)
Dept: RADIOLOGY | Age: 75
Discharge: HOME | End: 2020-06-05
Payer: COMMERCIAL

## 2020-06-05 DIAGNOSIS — M25.561 ACUTE PAIN OF RIGHT KNEE: ICD-10-CM

## 2020-06-05 PROCEDURE — 73700 CT LOWER EXTREMITY W/O DYE: CPT

## 2020-06-08 DIAGNOSIS — M54.50 LUMBAR BACK PAIN: ICD-10-CM

## 2020-06-08 DIAGNOSIS — M96.1 POST LAMINECTOMY SYNDROME: ICD-10-CM

## 2020-06-10 RX ORDER — OXYCODONE AND ACETAMINOPHEN 10; 325 MG/1; MG/1
1 TABLET ORAL EVERY 6 HOURS PRN
Qty: 100 TABLET | Refills: 0 | Status: SHIPPED | OUTPATIENT
Start: 2020-06-10 | End: 2020-07-09 | Stop reason: SDUPTHER

## 2020-06-16 DIAGNOSIS — K31.84 GASTROPARESIS: ICD-10-CM

## 2020-06-16 RX ORDER — METOCLOPRAMIDE 5 MG/1
5 TABLET ORAL 3 TIMES DAILY
Qty: 90 TABLET | Refills: 3 | Status: ON HOLD | OUTPATIENT
Start: 2020-06-16 | End: 2021-05-12 | Stop reason: SDUPTHER

## 2020-06-22 DIAGNOSIS — R11.0 NAUSEA: Primary | ICD-10-CM

## 2020-06-22 RX ORDER — PROMETHAZINE HYDROCHLORIDE 25 MG/1
25 TABLET ORAL EVERY 6 HOURS PRN
Qty: 30 TABLET | Refills: 3 | Status: SHIPPED | OUTPATIENT
Start: 2020-06-22 | End: 2021-05-12 | Stop reason: HOSPADM

## 2020-06-23 DIAGNOSIS — E03.9 HYPOTHYROIDISM, UNSPECIFIED TYPE: ICD-10-CM

## 2020-06-23 RX ORDER — LEVOTHYROXINE SODIUM 0.03 MG/1
37.5 TABLET ORAL DAILY
Qty: 45 TABLET | Refills: 3 | Status: SHIPPED | OUTPATIENT
Start: 2020-06-23 | End: 2020-12-30 | Stop reason: SDUPTHER

## 2020-07-01 DIAGNOSIS — M54.50 LUMBAR BACK PAIN: ICD-10-CM

## 2020-07-01 RX ORDER — CYCLOBENZAPRINE HCL 10 MG
10 TABLET ORAL
Qty: 30 TABLET | Refills: 3 | Status: SHIPPED | OUTPATIENT
Start: 2020-07-01 | End: 2021-05-12 | Stop reason: HOSPADM

## 2020-07-09 DIAGNOSIS — M54.50 LUMBAR BACK PAIN: ICD-10-CM

## 2020-07-09 DIAGNOSIS — M96.1 POST LAMINECTOMY SYNDROME: ICD-10-CM

## 2020-07-09 RX ORDER — OXYCODONE AND ACETAMINOPHEN 10; 325 MG/1; MG/1
1 TABLET ORAL EVERY 6 HOURS PRN
Qty: 100 TABLET | Refills: 0 | Status: SHIPPED | OUTPATIENT
Start: 2020-07-09 | End: 2020-08-07 | Stop reason: SDUPTHER

## 2020-07-10 DIAGNOSIS — E13.9 DIABETES 1.5, MANAGED AS TYPE 2 (HCC): ICD-10-CM

## 2020-07-14 DIAGNOSIS — M48.062 LUMBAR STENOSIS WITH NEUROGENIC CLAUDICATION: ICD-10-CM

## 2020-07-14 RX ORDER — GABAPENTIN 300 MG/1
CAPSULE ORAL
Qty: 270 CAPSULE | Refills: 3 | Status: SHIPPED | OUTPATIENT
Start: 2020-07-14 | End: 2021-04-14 | Stop reason: SDUPTHER

## 2020-07-15 ENCOUNTER — APPOINTMENT (INPATIENT)
Dept: RADIOLOGY | Facility: HOSPITAL | Age: 75
DRG: 378 | End: 2020-07-15
Payer: COMMERCIAL

## 2020-07-15 ENCOUNTER — APPOINTMENT (INPATIENT)
Dept: CT IMAGING | Facility: HOSPITAL | Age: 75
DRG: 378 | End: 2020-07-15
Payer: COMMERCIAL

## 2020-07-15 ENCOUNTER — HOSPITAL ENCOUNTER (INPATIENT)
Facility: HOSPITAL | Age: 75
LOS: 2 days | Discharge: HOME | DRG: 378 | End: 2020-07-17
Attending: EMERGENCY MEDICINE | Admitting: HOSPITALIST
Payer: COMMERCIAL

## 2020-07-15 DIAGNOSIS — D50.8 IRON DEFICIENCY ANEMIA SECONDARY TO INADEQUATE DIETARY IRON INTAKE: ICD-10-CM

## 2020-07-15 DIAGNOSIS — E11.65 HYPERGLYCEMIA DUE TO TYPE 2 DIABETES MELLITUS (HCC): ICD-10-CM

## 2020-07-15 DIAGNOSIS — K92.2 UGI BLEED: Primary | ICD-10-CM

## 2020-07-15 DIAGNOSIS — K92.1 MELENA: ICD-10-CM

## 2020-07-15 DIAGNOSIS — M25.561 ACUTE PAIN OF RIGHT KNEE: ICD-10-CM

## 2020-07-15 DIAGNOSIS — K21.9 GASTROESOPHAGEAL REFLUX DISEASE WITHOUT ESOPHAGITIS: ICD-10-CM

## 2020-07-15 PROBLEM — R00.0 TACHYCARDIA: Status: ACTIVE | Noted: 2020-07-15

## 2020-07-15 LAB
ABO GROUP BLD: NORMAL
ALBUMIN SERPL BCP-MCNC: 2.9 G/DL (ref 3.5–5)
ALP SERPL-CCNC: 87 U/L (ref 46–116)
ALT SERPL W P-5'-P-CCNC: 14 U/L (ref 12–78)
ANION GAP SERPL CALCULATED.3IONS-SCNC: 11 MMOL/L (ref 4–13)
AST SERPL W P-5'-P-CCNC: 14 U/L (ref 5–45)
BASOPHILS # BLD AUTO: 0.03 THOUSANDS/ΜL (ref 0–0.1)
BASOPHILS NFR BLD AUTO: 0 % (ref 0–1)
BILIRUB SERPL-MCNC: 0.22 MG/DL (ref 0.2–1)
BLD GP AB SCN SERPL QL: NEGATIVE
BUN SERPL-MCNC: 44 MG/DL (ref 5–25)
CALCIUM SERPL-MCNC: 9 MG/DL (ref 8.3–10.1)
CHLORIDE SERPL-SCNC: 104 MMOL/L (ref 100–108)
CO2 SERPL-SCNC: 24 MMOL/L (ref 21–32)
CREAT SERPL-MCNC: 1.14 MG/DL (ref 0.6–1.3)
EOSINOPHIL # BLD AUTO: 0 THOUSAND/ΜL (ref 0–0.61)
EOSINOPHIL NFR BLD AUTO: 0 % (ref 0–6)
ERYTHROCYTE [DISTWIDTH] IN BLOOD BY AUTOMATED COUNT: 14.1 % (ref 11.6–15.1)
GFR SERPL CREATININE-BSD FRML MDRD: 46 ML/MIN/1.73SQ M
GLUCOSE SERPL-MCNC: 173 MG/DL (ref 65–140)
GLUCOSE SERPL-MCNC: 258 MG/DL (ref 65–140)
HCT VFR BLD AUTO: 21.1 % (ref 34.8–46.1)
HCT VFR BLD AUTO: 25.9 % (ref 34.8–46.1)
HGB BLD-MCNC: 6.5 G/DL (ref 11.5–15.4)
HGB BLD-MCNC: 7.7 G/DL (ref 11.5–15.4)
IMM GRANULOCYTES # BLD AUTO: 0.03 THOUSAND/UL (ref 0–0.2)
IMM GRANULOCYTES NFR BLD AUTO: 0 % (ref 0–2)
LYMPHOCYTES # BLD AUTO: 0.82 THOUSANDS/ΜL (ref 0.6–4.47)
LYMPHOCYTES NFR BLD AUTO: 8 % (ref 14–44)
MCH RBC QN AUTO: 26.8 PG (ref 26.8–34.3)
MCHC RBC AUTO-ENTMCNC: 29.7 G/DL (ref 31.4–37.4)
MCV RBC AUTO: 90 FL (ref 82–98)
MONOCYTES # BLD AUTO: 0.46 THOUSAND/ΜL (ref 0.17–1.22)
MONOCYTES NFR BLD AUTO: 5 % (ref 4–12)
NEUTROPHILS # BLD AUTO: 8.41 THOUSANDS/ΜL (ref 1.85–7.62)
NEUTS SEG NFR BLD AUTO: 87 % (ref 43–75)
NRBC BLD AUTO-RTO: 0 /100 WBCS
PLATELET # BLD AUTO: 287 THOUSANDS/UL (ref 149–390)
PMV BLD AUTO: 10.5 FL (ref 8.9–12.7)
POTASSIUM SERPL-SCNC: 4.6 MMOL/L (ref 3.5–5.3)
PROT SERPL-MCNC: 6.2 G/DL (ref 6.4–8.2)
RBC # BLD AUTO: 2.87 MILLION/UL (ref 3.81–5.12)
RH BLD: POSITIVE
SARS-COV-2 RNA RESP QL NAA+PROBE: NEGATIVE
SODIUM SERPL-SCNC: 139 MMOL/L (ref 136–145)
SPECIMEN EXPIRATION DATE: NORMAL
TROPONIN I SERPL-MCNC: 0.06 NG/ML
WBC # BLD AUTO: 9.75 THOUSAND/UL (ref 4.31–10.16)

## 2020-07-15 PROCEDURE — 74176 CT ABD & PELVIS W/O CONTRAST: CPT

## 2020-07-15 PROCEDURE — 85018 HEMOGLOBIN: CPT | Performed by: PHYSICIAN ASSISTANT

## 2020-07-15 PROCEDURE — 30233N1 TRANSFUSION OF NONAUTOLOGOUS RED BLOOD CELLS INTO PERIPHERAL VEIN, PERCUTANEOUS APPROACH: ICD-10-PCS | Performed by: INTERNAL MEDICINE

## 2020-07-15 PROCEDURE — C9113 INJ PANTOPRAZOLE SODIUM, VIA: HCPCS | Performed by: EMERGENCY MEDICINE

## 2020-07-15 PROCEDURE — 86920 COMPATIBILITY TEST SPIN: CPT

## 2020-07-15 PROCEDURE — 82948 REAGENT STRIP/BLOOD GLUCOSE: CPT

## 2020-07-15 PROCEDURE — 36415 COLL VENOUS BLD VENIPUNCTURE: CPT | Performed by: EMERGENCY MEDICINE

## 2020-07-15 PROCEDURE — 80053 COMPREHEN METABOLIC PANEL: CPT | Performed by: EMERGENCY MEDICINE

## 2020-07-15 PROCEDURE — 84484 ASSAY OF TROPONIN QUANT: CPT | Performed by: PHYSICIAN ASSISTANT

## 2020-07-15 PROCEDURE — 99285 EMERGENCY DEPT VISIT HI MDM: CPT

## 2020-07-15 PROCEDURE — 93005 ELECTROCARDIOGRAM TRACING: CPT

## 2020-07-15 PROCEDURE — 86900 BLOOD TYPING SEROLOGIC ABO: CPT | Performed by: EMERGENCY MEDICINE

## 2020-07-15 PROCEDURE — 86901 BLOOD TYPING SEROLOGIC RH(D): CPT | Performed by: EMERGENCY MEDICINE

## 2020-07-15 PROCEDURE — 71045 X-RAY EXAM CHEST 1 VIEW: CPT

## 2020-07-15 PROCEDURE — 85025 COMPLETE CBC W/AUTO DIFF WBC: CPT | Performed by: EMERGENCY MEDICINE

## 2020-07-15 PROCEDURE — 99285 EMERGENCY DEPT VISIT HI MDM: CPT | Performed by: EMERGENCY MEDICINE

## 2020-07-15 PROCEDURE — 86850 RBC ANTIBODY SCREEN: CPT | Performed by: EMERGENCY MEDICINE

## 2020-07-15 PROCEDURE — 99223 1ST HOSP IP/OBS HIGH 75: CPT | Performed by: INTERNAL MEDICINE

## 2020-07-15 PROCEDURE — 87635 SARS-COV-2 COVID-19 AMP PRB: CPT | Performed by: EMERGENCY MEDICINE

## 2020-07-15 PROCEDURE — 96365 THER/PROPH/DIAG IV INF INIT: CPT

## 2020-07-15 PROCEDURE — 36430 TRANSFUSION BLD/BLD COMPNT: CPT

## 2020-07-15 PROCEDURE — 85014 HEMATOCRIT: CPT | Performed by: PHYSICIAN ASSISTANT

## 2020-07-15 RX ORDER — GABAPENTIN 300 MG/1
300 CAPSULE ORAL 2 TIMES DAILY
Status: DISCONTINUED | OUTPATIENT
Start: 2020-07-15 | End: 2020-07-17 | Stop reason: HOSPADM

## 2020-07-15 RX ORDER — DOCUSATE SODIUM 100 MG/1
100 CAPSULE, LIQUID FILLED ORAL 2 TIMES DAILY
Status: DISCONTINUED | OUTPATIENT
Start: 2020-07-15 | End: 2020-07-17 | Stop reason: HOSPADM

## 2020-07-15 RX ORDER — ZOLPIDEM TARTRATE 5 MG/1
5 TABLET ORAL
Status: DISCONTINUED | OUTPATIENT
Start: 2020-07-15 | End: 2020-07-17 | Stop reason: HOSPADM

## 2020-07-15 RX ORDER — METOCLOPRAMIDE 10 MG/1
5 TABLET ORAL 3 TIMES DAILY
Status: DISCONTINUED | OUTPATIENT
Start: 2020-07-15 | End: 2020-07-16

## 2020-07-15 RX ORDER — LEVOTHYROXINE SODIUM 0.07 MG/1
37.5 TABLET ORAL
Status: DISCONTINUED | OUTPATIENT
Start: 2020-07-16 | End: 2020-07-17 | Stop reason: HOSPADM

## 2020-07-15 RX ORDER — SODIUM CHLORIDE, SODIUM LACTATE, POTASSIUM CHLORIDE, CALCIUM CHLORIDE 600; 310; 30; 20 MG/100ML; MG/100ML; MG/100ML; MG/100ML
100 INJECTION, SOLUTION INTRAVENOUS CONTINUOUS
Status: DISPENSED | OUTPATIENT
Start: 2020-07-15 | End: 2020-07-16

## 2020-07-15 RX ORDER — CYCLOBENZAPRINE HCL 10 MG
10 TABLET ORAL
Status: DISCONTINUED | OUTPATIENT
Start: 2020-07-15 | End: 2020-07-17 | Stop reason: HOSPADM

## 2020-07-15 RX ORDER — ACETAMINOPHEN 325 MG/1
650 TABLET ORAL EVERY 6 HOURS PRN
Status: DISCONTINUED | OUTPATIENT
Start: 2020-07-15 | End: 2020-07-17 | Stop reason: HOSPADM

## 2020-07-15 RX ORDER — PRAVASTATIN SODIUM 10 MG
10 TABLET ORAL
Status: DISCONTINUED | OUTPATIENT
Start: 2020-07-15 | End: 2020-07-17 | Stop reason: HOSPADM

## 2020-07-15 RX ORDER — OXYCODONE HYDROCHLORIDE 5 MG/1
5 TABLET ORAL EVERY 6 HOURS PRN
Status: DISCONTINUED | OUTPATIENT
Start: 2020-07-15 | End: 2020-07-17 | Stop reason: HOSPADM

## 2020-07-15 RX ORDER — SODIUM CHLORIDE 9 MG/ML
125 INJECTION, SOLUTION INTRAVENOUS CONTINUOUS
Status: DISCONTINUED | OUTPATIENT
Start: 2020-07-15 | End: 2020-07-15

## 2020-07-15 RX ORDER — NITROFURANTOIN 25; 75 MG/1; MG/1
100 CAPSULE ORAL
Status: DISCONTINUED | OUTPATIENT
Start: 2020-07-15 | End: 2020-07-15

## 2020-07-15 RX ORDER — ALBUTEROL SULFATE 90 UG/1
2 AEROSOL, METERED RESPIRATORY (INHALATION) EVERY 6 HOURS PRN
Status: DISCONTINUED | OUTPATIENT
Start: 2020-07-15 | End: 2020-07-17 | Stop reason: HOSPADM

## 2020-07-15 RX ORDER — ONDANSETRON 2 MG/ML
4 INJECTION INTRAMUSCULAR; INTRAVENOUS EVERY 6 HOURS PRN
Status: DISCONTINUED | OUTPATIENT
Start: 2020-07-15 | End: 2020-07-17 | Stop reason: HOSPADM

## 2020-07-15 RX ORDER — POTASSIUM CHLORIDE 20 MEQ/1
20 TABLET, EXTENDED RELEASE ORAL DAILY PRN
Status: DISCONTINUED | OUTPATIENT
Start: 2020-07-15 | End: 2020-07-17 | Stop reason: HOSPADM

## 2020-07-15 RX ADMIN — DOCUSATE SODIUM 100 MG: 100 CAPSULE, LIQUID FILLED ORAL at 20:51

## 2020-07-15 RX ADMIN — SODIUM CHLORIDE 125 ML/HR: 0.9 INJECTION, SOLUTION INTRAVENOUS at 13:37

## 2020-07-15 RX ADMIN — OXYCODONE HYDROCHLORIDE 5 MG: 5 TABLET ORAL at 17:57

## 2020-07-15 RX ADMIN — METOCLOPRAMIDE 5 MG: 10 TABLET ORAL at 15:41

## 2020-07-15 RX ADMIN — SODIUM CHLORIDE 80 MG: 9 INJECTION, SOLUTION INTRAVENOUS at 13:37

## 2020-07-15 RX ADMIN — OXYCODONE HYDROCHLORIDE 5 MG: 5 TABLET ORAL at 20:52

## 2020-07-15 RX ADMIN — METOCLOPRAMIDE 5 MG: 10 TABLET ORAL at 20:52

## 2020-07-15 RX ADMIN — CYCLOBENZAPRINE HYDROCHLORIDE 10 MG: 10 TABLET, FILM COATED ORAL at 21:29

## 2020-07-15 RX ADMIN — INSULIN LISPRO 1 UNITS: 100 INJECTION, SOLUTION INTRAVENOUS; SUBCUTANEOUS at 21:28

## 2020-07-15 RX ADMIN — GABAPENTIN 300 MG: 300 CAPSULE ORAL at 20:52

## 2020-07-15 RX ADMIN — MORPHINE SULFATE 2 MG: 2 INJECTION, SOLUTION INTRAMUSCULAR; INTRAVENOUS at 15:41

## 2020-07-15 RX ADMIN — ZOLPIDEM TARTRATE 5 MG: 5 TABLET, FILM COATED ORAL at 20:51

## 2020-07-15 RX ADMIN — PRAVASTATIN SODIUM 10 MG: 10 TABLET ORAL at 21:29

## 2020-07-15 NOTE — H&P
H&P- Elma Low 1945, 66 y o  female MRN: [id number]  Unit/Bed#: ED 25 Encounter: [id number]  Primary Care Provider: NANY King   Date and time admitted to hospital: 7/15/2020 12:56 PM    * Melena  Assessment & Plan  · Melanotic stools, Hemoccult positive  Also had emesis morning of admission  · BUN elevated  · Started on Protonix drip  · Discussed with GI in ED  · Will keep NPO  · Check CT abdomen as she has LLQ tenderness  · Heme 7 7 on presentation  Will trend Q6  Blood consent obtained in ED  · Place on IVF with caution       Tachycardia  Assessment & Plan  Likely hypovolemic  Check Chest Xray as patient has SOB last night     S/P insertion of spinal cord stimulator  Assessment & Plan  Placed years ago, without complication  Hypothyroidism  Assessment & Plan  Continue home Levothyroxine     Chronic pain disorder  Assessment & Plan  Takes Percocet p r n  Graham Mariee Provide IV options now that she is NPO  Diabetes mellitus, type 2 [facility])  Assessment & Plan  Lab Results   Component Value Date    HGBA1C 6 0 01/13/2020       No results for input(s): POCGLU in the last 72 hours  Blood Sugar Average: Last 72 hrs:   hold metformin  ISS  Schedule Accu-Cheks q 6 while she is NPO  VTE Prophylaxis: Pharmacologic VTE Prophylaxis contraindicated due to Active GI bleed  / sequential compression device   Code Status:  Full code  POLST: There is no POLST form on file for this patient (pre-hospital)  Discussion with family:  Discussed with patient and her daughter at bedside    Anticipated Length of Stay:  Patient will be admitted on an Inpatient basis with an anticipated length of stay of  greater than 2 midnights  Justification for Hospital Stay:  Melena    Total Time for Visit, including Counseling / Coordination of Care: 45 minutes  Greater than 50% of this total time spent on direct patient counseling and coordination of care      Chief Complaint:   Melena and emesis    History of Present Illness: Sol Painter is a 66 y o  female past medical history of DM type 2 and spinal stenosis who presents with melena and emesis  She states the night prior to admission, she developed acute feeling of cough/shortness of breath which led to an episode of emesis  She describes this as coffee ground emesis  The shortness of breath immediately resolved  She states throughout the night, she had several episodes of bloody diarrhea  The emesis repeated again 07/15/2020, the day of admission, when she called ambulance  Upon presentation to the ED, patient is hemodynamically stable  Hemoglobin is 7 7  There is no leukocytosis  BUN is 44  Her rectal exam reveals blood in rectal vault  She currently denies any shortness of breath  Case has been discussed with GI service  Will start on Protonix drip  Will keep NPO for now with serial H&H  Will check imaging  Review of Systems:    Review of Systems   Constitutional: Positive for appetite change  Negative for activity change and chills  HENT: Negative for congestion and sore throat  Respiratory: Positive for cough  Negative for shortness of breath (currently resolved)  Cardiovascular: Negative for chest pain and palpitations  Gastrointestinal: Positive for abdominal pain (LLQ), blood in stool, diarrhea and vomiting  Negative for abdominal distention, nausea and rectal pain  Endocrine: Negative for cold intolerance and heat intolerance  Genitourinary: Negative for difficulty urinating  Neurological: Positive for dizziness and weakness  Psychiatric/Behavioral: Negative for agitation         Past Medical and Surgical History:     Past Medical History:   Diagnosis Date    Acid reflux     Anxiety     Arthritis     Asthma     Basal cell carcinoma     upper lip    Chronic narcotic dependence (HCC)     Chronic pain     Colon polyp     Cystocele     Diabetes mellitus ([address] )     Diverticulosis     Dysfunctional uterine bleeding last assessed - [id number]    Fibromyalgia     Gastric ulcer     Gastroparesis     History of colonic polyps     last assessed - [id number]    History of gastroesophageal reflux (GERD)     Hypercholesterolemia     Hyperlipidemia     Hypertension     IBS (irritable bowel syndrome)     Kidney stone     Post laminectomy syndrome     Seasonal allergies     Spinal stenosis        Past Surgical History:   Procedure Laterality Date    APPENDECTOMY      BACK SURGERY      BREAST CYST ASPIRATION Left     pt unsure what exactly was removed    CHOLECYSTECTOMY      ESOPHAGOGASTRODUODENOSCOPY N/A 9/28/2016    Procedure: ESOPHAGOGASTRODUODENOSCOPY (EGD); Surgeon: Paulette Parker MD;  [facility]; Service:     HERNIA REPAIR      HYSTERECTOMY      TTAH-BSO age 25   [employer] LAMINECTOMY      LUMBAR LAMINECTOMY      OOPHORECTOMY      age 25   [employer] [place] ARTHRODESIS POSTERIOR/POSTEROLATERAL THORACIC N/A 6/4/2018    Procedure: Reopening of lumbar incision for T12-L5 posterior instrumented fixation and fusion and T12-L4 posterior decompression;  Surgeon: Magdy Serrano MD;  [facility];  Service: Neurosurgery    WI COLONOSCOPY FLX DX W/COLLJ SPEC WHEN PFRMD N/A 3/2/2016    Procedure: EGD AND COLONOSCOPY;  Surgeon: Paulette Parker MD;  [facility]; Service: Gastroenterology    WI DILATE ESOPHAGUS N/A 9/28/2016    Procedure: DILATATION ESOPHAGEAL;  Surgeon: Paulette Parker MD;  [facility]; Service: Gastroenterology    WI ESOPHAGOGASTRODUODENOSCOPY TRANSORAL DIAGNOSTIC N/A 7/18/2016    Procedure: ESOPHAGOGASTRODUODENOSCOPY (EGD); Surgeon: Paulette Parker MD;  [facility];   Service: Gastroenterology    WI IMPLANT SPINAL NEUROSTIM/ Left 6/4/2018    Procedure: removal of left buttock implantable pulse generator and placement of new  implantable pulse generator;  Surgeon: Magdy Serrano MD;  [facility];  Service: Neurosurgery       Meds/Allergies:    Prior to Admission medications    Medication Sig Start Date End Date Taking?  Authorizing Provider   albuterol (PROVENTIL HFA,VENTOLIN HFA) 90 mcg/act inhaler Inhale 2 puffs every 6 (six) hours as needed for wheezing or shortness of breath 5/11/20   [facility], DO   baclofen 10 mg tablet Take 1 tablet (10 mg total) by mouth 3 (three) times a day as needed for muscle spasms 5/8/20 6/7/20  [facility], DO   buPROPion (WELLBUTRIN XL) 150 mg 24 hr tablet Take 1 tablet (150 mg total) by mouth daily for 30 days 8/6/18 9/5/18  [facility], DO   cyclobenzaprine (FLEXERIL) 10 mg tablet Take 1 tablet (10 mg total) by mouth daily at bedtime 7/1/20 7/31/20  [facility], DO   dicyclomine (BENTYL) 10 mg capsule Take 1 capsule (10 mg total) by mouth 3 (three) times a day as needed (colon spasms) 3/4/20 4/3/20  [facility], DO   furosemide (LASIX) 20 mg tablet Take 1 tablet (20 mg total) by mouth daily as needed (edema) 3/25/20   NANY Lai   gabapentin (NEURONTIN) 300 mg capsule 1 in am and 2 hs 7/14/20   [facility], DO   glucose blood test strip Bid testing 2/27/19   [facility], DO   guaiFENesin 400 mg Take 400 mg by mouth daily as needed for cough    Historical Provider, MD   hydrochlorothiazide (HYDRODIURIL) 12 5 mg tablet Take 1 tablet (12 5 mg total) by mouth daily as needed (edema/swelling) 1/14/20   NANY Lai   hyoscyamine (ANASPAZ) 0 125 mg Take 1 tablet (0 125 mg total) by mouth every 4 (four) hours as needed (spasms) for up to 10 days 10/23/19 11/2/19  [facility], DO   IRON PO Take 65 mg by mouth 2 (two) times a day      Historical Provider, MD   latanoprost (XALATAN) 0 005 % ophthalmic solution Administer 1 drop to both eyes daily at bedtime 1/27/20 2/26/20  Annel Baires, DO   levothyroxine 25 mcg tablet Take 1 5 tablets (37 5 mcg total) by mouth daily 6/23/20 7/23/20  Annel Baires, DO   lisinopril (ZESTRIL) 20 mg tablet Take 1 tablet (20 mg total) by mouth daily 5/26/20 6/25/20  Thi Baires DO   loratadine (CLARITIN) 10 mg tablet Take 10 mg by mouth daily as needed for allergies    Historical Provider, MD   Lutein 10 MG TABS Take 10 mg by mouth daily      Historical Provider, MD   meclizine (ANTIVERT) 25 mg tablet Take 1 tablet (25 mg total) by mouth 2 (two) times a day 3/12/20 4/11/20  Valente Duverney, CRNP   meloxicam (MOBIC) 15 mg tablet Take 1 tablet (15 mg total) by mouth daily 4/6/20   Valente Duverney, CRNP   metFORMIN (GLUCOPHAGE) 1000 MG tablet Take 1 tablet (1,000 mg total) by mouth 2 (two) times a day with meals 7/10/20 7/5/21  Valente Duverney, CRNP   metoclopramide (REGLAN) 5 mg tablet Take 1 tablet (5 mg total) by mouth 3 (three) times a day 6/16/20   Valente Duverney, CRNP   Milnacipran HCl (SAVELLA) 100 MG TABS Take 1 tablet (100 mg total) by mouth daily 1/27/20   Thi Baires DO   nitrofurantoin (MACRODANTIN) 50 mg capsule Take 1 capsule (50 mg total) by mouth daily at bedtime 4/6/20   Valente Duverney, CRNP   oxybutynin (DITROPAN) 5 mg tablet Take 1 tablet (5 mg total) by mouth 3 (three) times a day 5/19/20 6/18/20  Valente Duverney, CRNP   oxyCODONE-acetaminophen (PERCOCET)  mg per tablet Take 1 tablet by mouth every 6 (six) hours as needed for severe painMax Daily Amount: 4 tablets 7/9/20   Thi Baires DO   pantoprazole (PROTONIX) 40 mg tablet Take 1 tablet (40 mg total) by mouth 2 (two) times daily after meals for 30 days 5/1/19 5/31/19  Thi Baires DO   potassium chloride (K-DUR,KLOR-CON) 20 mEq tablet Take 1 tablet (20 mEq total) by mouth daily as needed (edema) 3/25/20   Valente Duverney, CRNP   pravastatin (PRAVACHOL) 10 mg tablet Take 1 tablet (10 mg total) by mouth daily at bedtime 5/26/20   Thi Baires DO   promethazine (PHENERGAN) 25 mg tablet Take 1 tablet (25 mg total) by mouth every 6 (six) hours as needed for nausea or vomiting 20   Richelle Baires DO   pyridoxine (VITAMIN B6) 100 mg tablet Take 100 mg by mouth daily    Historical Provider, MD   zolpidem (AMBIEN) 5 mg tablet Take 1 tablet (5 mg total) by mouth daily at bedtime as needed for sleep 19   Johann Nelson DO     I have reviewed home medications with patient personally  Allergies: Allergies   Allergen Reactions    Penicillins Anaphylaxis and Other (See Comments)     Other reaction(s): Unknown Reaction    Sulfa Antibiotics Anaphylaxis and Other (See Comments)     Other reaction(s): Unknown Reaction    Aspartame Other (See Comments) and Hypertension     Slurred speech, weakness, stroke sx    Iodinated Diagnostic Agents Hives     Pt has taken prep prior for contrast and has not had any break through reaction    Keflex [Cephalexin]        Social History:     Marital Status:      Substance Use History:   Social History     Substance and Sexual Activity   Alcohol Use No    Comment: Denied history of alcohol use     Social History     Tobacco Use   Smoking Status Former Smoker    Last attempt to quit: 1970    Years since quittin 5   Smokeless Tobacco Never Used   Tobacco Comment    Denied history of current ever day smoker, Former smoker and Never smoker all documented in [employer]     Social History     Substance and Sexual Activity   Drug Use No    Comment: Denied history of drug use       Family History:    non-contributory    Physical Exam:     Vitals:   Blood Pressure: 126/65 (07/15/20 1259)  Pulse: (!) 118 (07/15/20 1259)  Temperature: 99 1 °F (37 3 °C) (07/15/20 1302)  Temp Source: Oral (07/15/20 1302)  Respirations: 16 (07/15/20 1259)  Weight - Scale: 84 7 kg (186 lb 11 7 oz) (07/15/20 1259)  SpO2: 97 % (07/15/20 1259)    Physical Exam   Constitutional: She is oriented to person, place, and time  She appears well-developed and well-nourished  No distress  Pleasant, fully conversational female    Appears stated age   No acute distress  Lying on hospital stretcher  HENT:   Head: Normocephalic and atraumatic  Eyes: Right eye exhibits no discharge  Left eye exhibits no discharge  No scleral icterus  Cardiovascular: Regular rhythm  Exam reveals no gallop and no friction rub  No murmur heard  tachycardic   Pulmonary/Chest: Effort normal and breath sounds normal  No respiratory distress  She has no wheezes  She has no rales  Abdominal: Soft  Bowel sounds are normal  She exhibits no distension  There is tenderness (LLQ)  Neurological: She is alert and oriented to person, place, and time  Skin: Skin is warm and dry  Nursing note and vitals reviewed  Additional Data:     Lab Results: I have personally reviewed pertinent reports  Results from last 7 days   Lab Units 07/15/20  1327   WBC Thousand/uL 9 75   HEMOGLOBIN g/dL 7 7*   HEMATOCRIT % 25 9*   PLATELETS Thousands/uL 287   NEUTROS PCT % 87*   LYMPHS PCT % 8*   MONOS PCT % 5   EOS PCT % 0     Results from last 7 days   Lab Units 07/15/20  1327   SODIUM mmol/L 139   POTASSIUM mmol/L 4 6   CHLORIDE mmol/L 104   CO2 mmol/L 24   BUN mg/dL 44*   CREATININE mg/dL 1 14   ANION GAP mmol/L 11   CALCIUM mg/dL 9 0   ALBUMIN g/dL 2 9*   TOTAL BILIRUBIN mg/dL 0 22   ALK PHOS U/L 87   ALT U/L 14   AST U/L 14   GLUCOSE RANDOM mg/dL 258*                       Imaging: I have personally reviewed pertinent reports  CT abdomen pelvis w contrast    (Results Pending)   XR chest portable    (Results Pending)       EKG, Pathology, and Other Studies Reviewed on Admission:   · EKG: reviewed    [facility] Reviewed: Yes     ** Please Note: This note has been constructed using a voice recognition system   **

## 2020-07-15 NOTE — ED PROVIDER NOTES
History  Chief Complaint   Patient presents with    Rectal Bleeding     Pt states that her stool was dark and that she vomitted coffee grounds one time today  66 yr female -  States last night with gen ab d pain- this am with  dark liquid stools and vomitus of coffee ground type emesis x 1-  Lateral thsis morning-- no hx of pud- in on ppi-- no recent nsiaid use- unsure if still on mobic-- no recent gerd/dyspesia- progressive dysphagia--  No other comps- abd pain and nausea are much improved at present - pt did receive a blood transfusion for low hb in -  States has not had a egd for awahile      History provided by:  Patient   used: No        Prior to Admission Medications   Prescriptions Last Dose Informant Patient Reported? Taking?    IRON PO   Yes No   Sig: Take 65 mg by mouth 2 (two) times a day     Lutein 10 MG TABS  Self Yes No   Sig: Take 10 mg by mouth daily     Milnacipran HCl (SAVELLA) 100 MG TABS   No No   Sig: Take 1 tablet (100 mg total) by mouth daily   albuterol (PROVENTIL HFA,VENTOLIN HFA) 90 mcg/act inhaler   No No   Sig: Inhale 2 puffs every 6 (six) hours as needed for wheezing or shortness of breath   baclofen 10 mg tablet   No No   Sig: Take 1 tablet (10 mg total) by mouth 3 (three) times a day as needed for muscle spasms   buPROPion (WELLBUTRIN XL) 150 mg 24 hr tablet   No No   Sig: Take 1 tablet (150 mg total) by mouth daily for 30 days   cyclobenzaprine (FLEXERIL) 10 mg tablet   No No   Sig: Take 1 tablet (10 mg total) by mouth daily at bedtime   dicyclomine (BENTYL) 10 mg capsule   No No   Sig: Take 1 capsule (10 mg total) by mouth 3 (three) times a day as needed (colon spasms)   furosemide (LASIX) 20 mg tablet   No No   Sig: Take 1 tablet (20 mg total) by mouth daily as needed (edema)   gabapentin (NEURONTIN) 300 mg capsule   No No   Si in am and 2 hs   glucose blood test strip   No No   Sig: Bid testing   guaiFENesin 400 mg   Yes No   Sig: Take 400 mg by mouth daily as needed for cough   hydrochlorothiazide (HYDRODIURIL) 12 5 mg tablet   No No   Sig: Take 1 tablet (12 5 mg total) by mouth daily as needed (edema/swelling)   hyoscyamine (ANASPAZ) 0 125 mg   No No   Sig: Take 1 tablet (0 125 mg total) by mouth every 4 (four) hours as needed (spasms) for up to 10 days   latanoprost (XALATAN) 0 005 % ophthalmic solution   No No   Sig: Administer 1 drop to both eyes daily at bedtime   levothyroxine 25 mcg tablet   No No   Sig: Take 1 5 tablets (37 5 mcg total) by mouth daily   lisinopril (ZESTRIL) 20 mg tablet   No No   Sig: Take 1 tablet (20 mg total) by mouth daily   loratadine (CLARITIN) 10 mg tablet   Yes No   Sig: Take 10 mg by mouth daily as needed for allergies   meclizine (ANTIVERT) 25 mg tablet   No No   Sig: Take 1 tablet (25 mg total) by mouth 2 (two) times a day   meloxicam (MOBIC) 15 mg tablet   No No   Sig: Take 1 tablet (15 mg total) by mouth daily   metFORMIN (GLUCOPHAGE) 1000 MG tablet   No No   Sig: Take 1 tablet (1,000 mg total) by mouth 2 (two) times a day with meals   metoclopramide (REGLAN) 5 mg tablet   No No   Sig: Take 1 tablet (5 mg total) by mouth 3 (three) times a day   nitrofurantoin (MACRODANTIN) 50 mg capsule   No No   Sig: Take 1 capsule (50 mg total) by mouth daily at bedtime   oxyCODONE-acetaminophen (PERCOCET)  mg per tablet   No No   Sig: Take 1 tablet by mouth every 6 (six) hours as needed for severe painMax Daily Amount: 4 tablets   oxybutynin (DITROPAN) 5 mg tablet   No No   Sig: Take 1 tablet (5 mg total) by mouth 3 (three) times a day   pantoprazole (PROTONIX) 40 mg tablet   No No   Sig: Take 1 tablet (40 mg total) by mouth 2 (two) times daily after meals for 30 days   potassium chloride (K-DUR,KLOR-CON) 20 mEq tablet   No No   Sig: Take 1 tablet (20 mEq total) by mouth daily as needed (edema)   pravastatin (PRAVACHOL) 10 mg tablet   No No   Sig: Take 1 tablet (10 mg total) by mouth daily at bedtime   promethazine (PHENERGAN) 25 mg tablet   No No   Sig: Take 1 tablet (25 mg total) by mouth every 6 (six) hours as needed for nausea or vomiting   pyridoxine (VITAMIN B6) 100 mg tablet   Yes No   Sig: Take 100 mg by mouth daily   zolpidem (AMBIEN) 5 mg tablet   No No   Sig: Take 1 tablet (5 mg total) by mouth daily at bedtime as needed for sleep      Facility-Administered Medications: None       Past Medical History:   Diagnosis Date    Acid reflux     Anxiety     Arthritis     Asthma     Basal cell carcinoma     upper lip    Chronic narcotic dependence (HCC)     Chronic pain     Colon polyp     Cystocele     Diabetes mellitus ([facility] [address] )     Diverticulosis     Dysfunctional uterine bleeding     last assessed - [id number]    Fibromyalgia     Gastric ulcer     Gastroparesis     History of colonic polyps     last assessed - [id number]    History of gastroesophageal reflux (GERD)     Hypercholesterolemia     Hyperlipidemia     Hypertension     IBS (irritable bowel syndrome)     Kidney stone     Post laminectomy syndrome     Seasonal allergies     Spinal stenosis        Past Surgical History:   Procedure Laterality Date    APPENDECTOMY      BACK SURGERY      BREAST CYST ASPIRATION Left     pt unsure what exactly was removed    CHOLECYSTECTOMY      ESOPHAGOGASTRODUODENOSCOPY N/A 9/28/2016    Procedure: ESOPHAGOGASTRODUODENOSCOPY (EGD); Surgeon: Lois Avalos MD;  [facility];   Service:     HERNIA REPAIR      HYSTERECTOMY      TTAH-BSO age 25   Rodriguez LAMINECTOMY      LUMBAR LAMINECTOMY      OOPHORECTOMY      age 25   Rodriguez GA ARTHRODESIS POSTERIOR/POSTEROLATERAL THORACIC N/A 6/4/2018    Procedure: Reopening of lumbar incision for T12-L5 posterior instrumented fixation and fusion and T12-L4 posterior decompression;  Surgeon: Delia Joseph MD;  [facility];  Service: Neurosurgery    GA COLONOSCOPY FLX DX W/COLLJ SPEC WHEN PFRMD N/A 3/2/2016    Procedure: EGD AND COLONOSCOPY;  Surgeon: Lois Avalos MD;  [facility]; Service: Gastroenterology    SC DILATE ESOPHAGUS N/A 2016    Procedure: DILATATION ESOPHAGEAL;  Surgeon: Ninoska De La Cruz MD;  [facility]; Service: Gastroenterology    SC ESOPHAGOGASTRODUODENOSCOPY TRANSORAL DIAGNOSTIC N/A 2016    Procedure: ESOPHAGOGASTRODUODENOSCOPY (EGD); Surgeon: Ninoska De La Cruz MD;  [facility]; Service: Gastroenterology    SC IMPLANT SPINAL NEUROSTIM/ Left 2018    Procedure: removal of left buttock implantable pulse generator and placement of new  implantable pulse generator;  Surgeon: Es Harvey MD;  [facility];  Service: Neurosurgery       Family History   Problem Relation Age of Onset    Lung cancer Mother 55    Pulmonary embolism Father     Stroke Maternal Grandmother     Heart attack Maternal Grandfather     No Known Problems Paternal Grandmother     No Known Problems Paternal Grandfather     Diabetes Family         Diabetes mellitus    Hypertension Family     Stroke Family         Stroke complications    No Known Problems Daughter     No Known Problems Daughter     No Known Problems Sister     No Known Problems Maternal Aunt      I have reviewed and agree with the history as documented  E-Cigarette/Vaping     E-Cigarette/Vaping Substances     Social History     Tobacco Use    Smoking status: Former Smoker     Last attempt to quit: 1970     Years since quittin 5    Smokeless tobacco: Never Used    Tobacco comment: Denied history of current ever day smoker, Former smoker and Never smoker all documented in [employer]   Substance Use Topics    Alcohol use: No     Comment: Denied history of alcohol use    Drug use: No     Comment: Denied history of drug use       Review of Systems   Constitutional: Positive for appetite change  Negative for activity change, chills, diaphoresis, fatigue, fever and unexpected weight change  HENT: Negative  Eyes: Negative  Cardiovascular: Negative  Gastrointestinal: Positive for abdominal pain, blood in stool, nausea and vomiting  Negative for abdominal distention, anal bleeding, constipation, diarrhea and rectal pain  Endocrine: Negative  Genitourinary: Negative  Musculoskeletal: Negative  Skin: Negative  Allergic/Immunologic: Negative  Neurological: Negative  Hematological: Negative  Psychiatric/Behavioral: Negative  Physical Exam  Physical Exam   Constitutional: She is oriented to person, place, and time  No distress  avss-- midld tchy --  Pulse ox 97 % on ra- interpretation is normal- no intervention    HENT:   Head: Normocephalic and atraumatic  Eyes: Pupils are equal, round, and reactive to light  Conjunctivae and EOM are normal  Right eye exhibits no discharge  Left eye exhibits no discharge  No scleral icterus  Mm pink   Neck: Normal range of motion  Neck supple  No JVD present  No tracheal deviation present  No thyromegaly present  No pmt c/t/l/s spine   Cardiovascular: Regular rhythm and intact distal pulses  Exam reveals no gallop and no friction rub  Murmur heard  Pulmonary/Chest: Effort normal and breath sounds normal  No stridor  No respiratory distress  She has no wheezes  She has no rales  Abdominal: Soft  Bowel sounds are normal  She exhibits no distension and no mass  There is tenderness  There is no rebound and no guarding  No hernia  Mild luq abd tenderness -- rest of abd is soft- nt/nd- no peritoneal signs- no cva tendenrness/ no hsm- no pulsatile abd mass/bruit/ tenderness   Musculoskeletal: Normal range of motion  She exhibits tenderness  She exhibits no edema or deformity  1 plus ble pretibial edema- nt- no asym/ erythema- equal bilateral radial/dp pulses   Lymphadenopathy:     She has no cervical adenopathy  Neurological: She is alert and oriented to person, place, and time  No cranial nerve deficit or sensory deficit  She exhibits normal muscle tone   Coordination normal    Normal non focal neuro exam    Skin: Skin is warm  Capillary refill takes less than 2 seconds  No rash noted  She is not diaphoretic  No erythema  There is pallor  Psychiatric: She has a normal mood and affect  Her behavior is normal  Judgment and thought content normal    Nursing note and vitals reviewed  Vital Signs  ED Triage Vitals   Temperature Pulse Respirations Blood Pressure SpO2   07/15/20 1302 07/15/20 1259 07/15/20 1259 07/15/20 1259 07/15/20 1259   99 1 °F (37 3 °C) (!) 118 16 126/65 97 %      Temp Source Heart Rate Source Patient Position - Orthostatic VS BP Location FiO2 (%)   07/15/20 1302 07/15/20 1259 07/15/20 1259 07/15/20 1259 --   Oral Monitor Lying Right arm       Pain Score       07/15/20 1259       4           Vitals:    07/15/20 1259   BP: 126/65   Pulse: (!) 118   Patient Position - Orthostatic VS: Lying         Visual Acuity      ED Medications  Medications   pantoprazole (PROTONIX) 80 mg in sodium chloride 0 9 % 100 mL IVPB (has no administration in time range)   sodium chloride 0 9 % infusion (has no administration in time range)       Diagnostic Studies  Results Reviewed     Procedure Component Value Units Date/Time    [facility] Coron[facility] [[id number]] Collected:  07/15/20 1334    Lab Status:  No result Specimen:  Nares from Nose     CBC and differential [[id number]] Collected:  07/15/20 1327    Lab Status: In process Specimen:  Blood from Arm, Left Updated:  07/15/20 1332    Comprehensive metabolic panel [[id number]] Collected:  07/15/20 1327    Lab Status:   In process Specimen:  Blood from Arm, Left Updated:  07/15/20 1332                 No orders to display              Procedures  Procedures         ED Course  ED Course as of Jul 15 1335   Wed Jul 15, 2020   1314 - er md note- pt did not take any of her meds this am       200 Er md  note- ano-rectal exam- normql anal exam- pos melena in vault- grossly heme pos      1317 - er med note- recent labs all reviewed by kelley rajan 36 Er md note- pt placed on maintance fluids                                                MDM      Disposition  Final diagnoses:   None     ED Disposition     None      Follow-up Information    None         Patient's Medications   Discharge Prescriptions    No medications on file     No discharge procedures on file      PDMP Review       Value Time User    PDMP Reviewed  Yes 7/9/2020  2:00 PM Olga Cody, DO          ED Provider  Electronically Signed by           Lina Vazquez MD  07/15/20 1156

## 2020-07-15 NOTE — ED PROCEDURE NOTE
PROCEDURE  ECG 12 Lead Documentation Only  Date/Time: 7/15/2020 1:32 PM  Performed by: Bharat Mays MD  Authorized by: Bharat Mays MD     Indications / Diagnosis:  Tachy  ECG reviewed by me, the ED Provider: yes    Patient location:  ED and bedside  Previous ECG:     Previous ECG:  Compared to current    Comparison ECG info:  9/26/12-ivcd is new     Similarity:  Changes noted    Comparison to cardiac monitor: Yes    Interpretation:     Interpretation: non-specific    Rate:     ECG rate:  114    ECG rate assessment: tachycardic    Rhythm:     Rhythm: sinus tachycardia    Ectopy:     Ectopy: none    QRS:     QRS axis:  Left    QRS intervals:   Wide  Conduction:     Conduction: abnormal      Abnormal conduction: non-specific intraventricular conduction delay    ST segments:     ST segments:  Normal  T waves:     T waves: flattening      Flattening:  I, aVL, V5 and V6  Q waves:     Q waves:  V1  Other findings:     Other findings: poor R wave progression and prolonged qTc interval    Comments:      No ecg signs of ischemia/ injury / r heart strain / kristan/pericarditis          Bharat Mays MD  07/15/20 6671

## 2020-07-15 NOTE — ED NOTES
Pt is resting comfortably at this time    Pt is awaiting for bed assignment     Mila Rao RN  07/15/20 2648

## 2020-07-16 ENCOUNTER — ANESTHESIA EVENT (INPATIENT)
Dept: GASTROENTEROLOGY | Facility: HOSPITAL | Age: 75
DRG: 378 | End: 2020-07-16
Payer: COMMERCIAL

## 2020-07-16 ENCOUNTER — APPOINTMENT (INPATIENT)
Dept: GASTROENTEROLOGY | Facility: HOSPITAL | Age: 75
DRG: 378 | End: 2020-07-16
Payer: COMMERCIAL

## 2020-07-16 ENCOUNTER — ANESTHESIA (INPATIENT)
Dept: GASTROENTEROLOGY | Facility: HOSPITAL | Age: 75
DRG: 378 | End: 2020-07-16
Payer: COMMERCIAL

## 2020-07-16 DIAGNOSIS — M79.7 FIBROMYALGIA: ICD-10-CM

## 2020-07-16 PROBLEM — R79.89 ELEVATED TROPONIN: Status: ACTIVE | Noted: 2020-07-16

## 2020-07-16 PROBLEM — R77.8 ELEVATED TROPONIN: Status: ACTIVE | Noted: 2020-07-16

## 2020-07-16 PROBLEM — M25.561 RIGHT KNEE PAIN: Status: ACTIVE | Noted: 2020-07-16

## 2020-07-16 LAB
ABO GROUP BLD BPU: NORMAL
ANION GAP SERPL CALCULATED.3IONS-SCNC: 8 MMOL/L (ref 4–13)
ATRIAL RATE: 117 BPM
BASOPHILS # BLD AUTO: 0.06 THOUSANDS/ΜL (ref 0–0.1)
BASOPHILS NFR BLD AUTO: 1 % (ref 0–1)
BPU ID: NORMAL
BUN SERPL-MCNC: 44 MG/DL (ref 5–25)
CALCIUM SERPL-MCNC: 8.9 MG/DL (ref 8.3–10.1)
CHLORIDE SERPL-SCNC: 109 MMOL/L (ref 100–108)
CO2 SERPL-SCNC: 21 MMOL/L (ref 21–32)
CREAT SERPL-MCNC: 0.98 MG/DL (ref 0.6–1.3)
CROSSMATCH: NORMAL
EOSINOPHIL # BLD AUTO: 0.17 THOUSAND/ΜL (ref 0–0.61)
EOSINOPHIL NFR BLD AUTO: 2 % (ref 0–6)
ERYTHROCYTE [DISTWIDTH] IN BLOOD BY AUTOMATED COUNT: 16.5 % (ref 11.6–15.1)
GFR SERPL CREATININE-BSD FRML MDRD: 56 ML/MIN/1.73SQ M
GLUCOSE SERPL-MCNC: 141 MG/DL (ref 65–140)
GLUCOSE SERPL-MCNC: 145 MG/DL (ref 65–140)
GLUCOSE SERPL-MCNC: 147 MG/DL (ref 65–140)
GLUCOSE SERPL-MCNC: 155 MG/DL (ref 65–140)
GLUCOSE SERPL-MCNC: 162 MG/DL (ref 65–140)
GLUCOSE SERPL-MCNC: 200 MG/DL (ref 65–140)
HCT VFR BLD AUTO: 24.2 % (ref 34.8–46.1)
HCT VFR BLD AUTO: 26.5 % (ref 34.8–46.1)
HGB BLD-MCNC: 7.4 G/DL (ref 11.5–15.4)
HGB BLD-MCNC: 7.8 G/DL (ref 11.5–15.4)
IMM GRANULOCYTES # BLD AUTO: 0.01 THOUSAND/UL (ref 0–0.2)
IMM GRANULOCYTES NFR BLD AUTO: 0 % (ref 0–2)
LYMPHOCYTES # BLD AUTO: 2.8 THOUSANDS/ΜL (ref 0.6–4.47)
LYMPHOCYTES NFR BLD AUTO: 34 % (ref 14–44)
MCH RBC QN AUTO: 26.4 PG (ref 26.8–34.3)
MCHC RBC AUTO-ENTMCNC: 29.4 G/DL (ref 31.4–37.4)
MCV RBC AUTO: 90 FL (ref 82–98)
MONOCYTES # BLD AUTO: 0.89 THOUSAND/ΜL (ref 0.17–1.22)
MONOCYTES NFR BLD AUTO: 11 % (ref 4–12)
NEUTROPHILS # BLD AUTO: 4.27 THOUSANDS/ΜL (ref 1.85–7.62)
NEUTS SEG NFR BLD AUTO: 52 % (ref 43–75)
NRBC BLD AUTO-RTO: 0 /100 WBCS
P AXIS: 75 DEGREES
PLATELET # BLD AUTO: 240 THOUSANDS/UL (ref 149–390)
PMV BLD AUTO: 10.5 FL (ref 8.9–12.7)
POTASSIUM SERPL-SCNC: 4 MMOL/L (ref 3.5–5.3)
PR INTERVAL: 124 MS
QRS AXIS: -37 DEGREES
QRSD INTERVAL: 124 MS
QT INTERVAL: 370 MS
QTC INTERVAL: 509 MS
RBC # BLD AUTO: 2.95 MILLION/UL (ref 3.81–5.12)
SODIUM SERPL-SCNC: 138 MMOL/L (ref 136–145)
T WAVE AXIS: 101 DEGREES
TROPONIN I SERPL-MCNC: 0.04 NG/ML
UNIT DISPENSE STATUS: NORMAL
UNIT PRODUCT CODE: NORMAL
UNIT RH: NORMAL
VENTRICULAR RATE: 114 BPM
WBC # BLD AUTO: 8.2 THOUSAND/UL (ref 4.31–10.16)

## 2020-07-16 PROCEDURE — 43239 EGD BIOPSY SINGLE/MULTIPLE: CPT | Performed by: INTERNAL MEDICINE

## 2020-07-16 PROCEDURE — 93010 ELECTROCARDIOGRAM REPORT: CPT | Performed by: INTERNAL MEDICINE

## 2020-07-16 PROCEDURE — 82948 REAGENT STRIP/BLOOD GLUCOSE: CPT

## 2020-07-16 PROCEDURE — 84484 ASSAY OF TROPONIN QUANT: CPT | Performed by: PHYSICIAN ASSISTANT

## 2020-07-16 PROCEDURE — 88305 TISSUE EXAM BY PATHOLOGIST: CPT | Performed by: PATHOLOGY

## 2020-07-16 PROCEDURE — P9016 RBC LEUKOCYTES REDUCED: HCPCS

## 2020-07-16 PROCEDURE — 99232 SBSQ HOSP IP/OBS MODERATE 35: CPT | Performed by: PHYSICIAN ASSISTANT

## 2020-07-16 PROCEDURE — 85025 COMPLETE CBC W/AUTO DIFF WBC: CPT | Performed by: PHYSICIAN ASSISTANT

## 2020-07-16 PROCEDURE — 0DB68ZX EXCISION OF STOMACH, VIA NATURAL OR ARTIFICIAL OPENING ENDOSCOPIC, DIAGNOSTIC: ICD-10-PCS | Performed by: INTERNAL MEDICINE

## 2020-07-16 PROCEDURE — C9113 INJ PANTOPRAZOLE SODIUM, VIA: HCPCS | Performed by: PHYSICIAN ASSISTANT

## 2020-07-16 PROCEDURE — 85014 HEMATOCRIT: CPT | Performed by: INTERNAL MEDICINE

## 2020-07-16 PROCEDURE — 85018 HEMOGLOBIN: CPT | Performed by: INTERNAL MEDICINE

## 2020-07-16 PROCEDURE — 99223 1ST HOSP IP/OBS HIGH 75: CPT | Performed by: INTERNAL MEDICINE

## 2020-07-16 PROCEDURE — 80048 BASIC METABOLIC PNL TOTAL CA: CPT | Performed by: PHYSICIAN ASSISTANT

## 2020-07-16 RX ORDER — GLYCOPYRROLATE 0.2 MG/ML
INJECTION INTRAMUSCULAR; INTRAVENOUS AS NEEDED
Status: DISCONTINUED | OUTPATIENT
Start: 2020-07-16 | End: 2020-07-16 | Stop reason: SURG

## 2020-07-16 RX ORDER — LIDOCAINE HYDROCHLORIDE 10 MG/ML
INJECTION, SOLUTION EPIDURAL; INFILTRATION; INTRACAUDAL; PERINEURAL AS NEEDED
Status: DISCONTINUED | OUTPATIENT
Start: 2020-07-16 | End: 2020-07-16 | Stop reason: SURG

## 2020-07-16 RX ORDER — KETAMINE HYDROCHLORIDE 50 MG/ML
INJECTION, SOLUTION, CONCENTRATE INTRAMUSCULAR; INTRAVENOUS AS NEEDED
Status: DISCONTINUED | OUTPATIENT
Start: 2020-07-16 | End: 2020-07-16 | Stop reason: SURG

## 2020-07-16 RX ORDER — METOCLOPRAMIDE HYDROCHLORIDE 5 MG/ML
10 INJECTION INTRAMUSCULAR; INTRAVENOUS EVERY 6 HOURS PRN
Status: DISCONTINUED | OUTPATIENT
Start: 2020-07-16 | End: 2020-07-17 | Stop reason: HOSPADM

## 2020-07-16 RX ORDER — SUCRALFATE ORAL 1 G/10ML
1000 SUSPENSION ORAL EVERY 6 HOURS SCHEDULED
Status: DISCONTINUED | OUTPATIENT
Start: 2020-07-16 | End: 2020-07-17 | Stop reason: HOSPADM

## 2020-07-16 RX ORDER — PROPOFOL 10 MG/ML
INJECTION, EMULSION INTRAVENOUS AS NEEDED
Status: DISCONTINUED | OUTPATIENT
Start: 2020-07-16 | End: 2020-07-16 | Stop reason: SURG

## 2020-07-16 RX ORDER — SODIUM CHLORIDE, SODIUM LACTATE, POTASSIUM CHLORIDE, CALCIUM CHLORIDE 600; 310; 30; 20 MG/100ML; MG/100ML; MG/100ML; MG/100ML
50 INJECTION, SOLUTION INTRAVENOUS CONTINUOUS
Status: DISPENSED | OUTPATIENT
Start: 2020-07-16 | End: 2020-07-17

## 2020-07-16 RX ADMIN — LEVOTHYROXINE SODIUM 37.5 MCG: 75 TABLET ORAL at 06:07

## 2020-07-16 RX ADMIN — PRAVASTATIN SODIUM 10 MG: 10 TABLET ORAL at 23:51

## 2020-07-16 RX ADMIN — MORPHINE SULFATE 2 MG: 2 INJECTION, SOLUTION INTRAMUSCULAR; INTRAVENOUS at 14:01

## 2020-07-16 RX ADMIN — SUCRALFATE 1000 MG: 1 SUSPENSION ORAL at 23:51

## 2020-07-16 RX ADMIN — SUCRALFATE 1000 MG: 1 SUSPENSION ORAL at 15:37

## 2020-07-16 RX ADMIN — MORPHINE SULFATE 2 MG: 2 INJECTION, SOLUTION INTRAMUSCULAR; INTRAVENOUS at 18:39

## 2020-07-16 RX ADMIN — SODIUM CHLORIDE, SODIUM LACTATE, POTASSIUM CHLORIDE, AND CALCIUM CHLORIDE 100 ML/HR: .6; .31; .03; .02 INJECTION, SOLUTION INTRAVENOUS at 16:24

## 2020-07-16 RX ADMIN — PROPOFOL 20 MG: 10 INJECTION, EMULSION INTRAVENOUS at 12:35

## 2020-07-16 RX ADMIN — KETAMINE HYDROCHLORIDE 10 MG: 50 INJECTION INTRAMUSCULAR; INTRAVENOUS at 12:36

## 2020-07-16 RX ADMIN — INSULIN LISPRO 1 UNITS: 100 INJECTION, SOLUTION INTRAVENOUS; SUBCUTANEOUS at 06:14

## 2020-07-16 RX ADMIN — GLYCOPYRROLATE 0.1 MG: 0.2 INJECTION, SOLUTION INTRAMUSCULAR; INTRAVENOUS at 12:34

## 2020-07-16 RX ADMIN — DOCUSATE SODIUM 100 MG: 100 CAPSULE, LIQUID FILLED ORAL at 17:49

## 2020-07-16 RX ADMIN — GABAPENTIN 300 MG: 300 CAPSULE ORAL at 17:50

## 2020-07-16 RX ADMIN — SODIUM CHLORIDE 80 MG: 9 INJECTION, SOLUTION INTRAVENOUS at 09:54

## 2020-07-16 RX ADMIN — SUCRALFATE 1000 MG: 1 SUSPENSION ORAL at 17:48

## 2020-07-16 RX ADMIN — ACETAMINOPHEN 650 MG: 325 TABLET, FILM COATED ORAL at 17:49

## 2020-07-16 RX ADMIN — ACETAMINOPHEN 650 MG: 325 TABLET, FILM COATED ORAL at 01:57

## 2020-07-16 RX ADMIN — KETAMINE HYDROCHLORIDE 10 MG: 50 INJECTION INTRAMUSCULAR; INTRAVENOUS at 12:34

## 2020-07-16 RX ADMIN — OXYCODONE HYDROCHLORIDE 5 MG: 5 TABLET ORAL at 20:17

## 2020-07-16 RX ADMIN — SODIUM CHLORIDE, SODIUM LACTATE, POTASSIUM CHLORIDE, AND CALCIUM CHLORIDE 100 ML/HR: .6; .31; .03; .02 INJECTION, SOLUTION INTRAVENOUS at 02:59

## 2020-07-16 RX ADMIN — PROPOFOL 20 MG: 10 INJECTION, EMULSION INTRAVENOUS at 12:38

## 2020-07-16 RX ADMIN — CYCLOBENZAPRINE HYDROCHLORIDE 10 MG: 10 TABLET, FILM COATED ORAL at 23:51

## 2020-07-16 RX ADMIN — PROPOFOL 20 MG: 10 INJECTION, EMULSION INTRAVENOUS at 12:34

## 2020-07-16 RX ADMIN — PROPOFOL 20 MG: 10 INJECTION, EMULSION INTRAVENOUS at 12:36

## 2020-07-16 RX ADMIN — LIDOCAINE HYDROCHLORIDE 50 MG: 10 INJECTION, SOLUTION EPIDURAL; INFILTRATION; INTRACAUDAL; PERINEURAL at 12:34

## 2020-07-16 RX ADMIN — ZOLPIDEM TARTRATE 5 MG: 5 TABLET, FILM COATED ORAL at 20:52

## 2020-07-16 RX ADMIN — OXYCODONE HYDROCHLORIDE 5 MG: 5 TABLET ORAL at 15:37

## 2020-07-16 RX ADMIN — SODIUM CHLORIDE, SODIUM LACTATE, POTASSIUM CHLORIDE, AND CALCIUM CHLORIDE 50 ML/HR: .6; .31; .03; .02 INJECTION, SOLUTION INTRAVENOUS at 20:52

## 2020-07-16 NOTE — ANESTHESIA PREPROCEDURE EVALUATION
Review of Systems/Medical History  Patient summary reviewed  Chart reviewed      Cardiovascular  Hyperlipidemia, Hypertension controlled,    Pulmonary  Asthma , well controlled/ stable Last rescue: > 1 year ago ,        GI/Hepatic    GI bleeding , PUD, GERD well controlled,        Kidney stones,        Endo/Other  Diabetes well controlled type 2 Oral agent,   Obesity    GYN  Negative gynecology ROS          Hematology  Anemia ,     Musculoskeletal    Comment: Lumber fusion Arthritis     Neurology   Psychology   Anxiety,              Physical Exam    Airway    Mallampati score: II  TM Distance: >3 FB  Neck ROM: full     Dental   upper dentures and lower dentures,     Cardiovascular  Cardiovascular exam normal    Pulmonary  Pulmonary exam normal     Other Findings        Anesthesia Plan  ASA Score- 3     Anesthesia Type- IV sedation with anesthesia with ASA Monitors  Additional Monitors:   Airway Plan:         Plan Factors-  Patient did not smoke on day of surgery  Induction- intravenous  Postoperative Plan-     Informed Consent- Anesthetic plan and risks discussed with patient  I personally reviewed this patient with the CRNA  Discussed and agreed on the Anesthesia Plan with the CRNA  Yanira Bolden

## 2020-07-16 NOTE — ANESTHESIA POSTPROCEDURE EVALUATION
Post-Op Assessment Note    CV Status:  Stable    Pain management: adequate     Mental Status:  Alert and awake   Hydration Status:  Euvolemic   PONV Controlled:  Controlled   Airway Patency:  Patent   Post Op Vitals Reviewed: Yes      Staff: CRNA           BP   165/82   Temp   97 6   Pulse  62   Resp   16   SpO2   99% FM

## 2020-07-16 NOTE — UTILIZATION REVIEW
Initial Clinical Review    Admission: Date/Time/Statement: Admission Orders (From admission, onward)     Ordered        07/15/20 1448  Inpatient Admission  Once                   Orders Placed This Encounter   Procedures    Inpatient Admission     Standing Status:   Standing     Number of Occurrences:   1     Order Specific Question:   Admitting Physician     Answer:   Abdi Johnson     Order Specific Question:   Level of Care     Answer:   Med Surg [16]     Order Specific Question:   Estimated length of stay     Answer:   More than 2 Midnights     Order Specific Question:   Certification     Answer:   I certify that inpatient services are medically necessary for this patient for a duration of greater than two midnights  See H&P and MD Progress Notes for additional information about the patient's course of treatment  ED Arrival Information     Expected Arrival Acuity Means of Arrival Escorted By Service Admission Type    - 7/15/2020 12:55 Emergent Ambulance [address] Emergency    Arrival Complaint    -        Chief Complaint   Patient presents with    Rectal Bleeding     Pt states that her stool was dark and that she vomitted coffee grounds one time today  Assessment/Plan: this is a 66year old female from home to ED via ems admitted inpatient due to Melena  Presented due to abdominal pain starting last night and today with dark liquid stools 4 episodes  and vomitus of coffee ground emesis  On exam murmur  Abdominal tenderness  1+ bilateral pre tibial edema  Bun 44  Creatinine 1 14   H&H 7 7/25 9  CT abdomen with no acute pathology  In the ED given IV Protonix, IVF, morphine and Reglan  Plan is transfuse, IVF, IV Protonix  GI consulted  Trend H&H  Per GI on 7/16/2020 Patient with acute blood loss anemia with coffee ground emesis and melena    Has NSAID use, plan is PPI, EGD    7/16/2020 EGD- The esophagus appeared normal  · Stomach-deformed wide open pylorus with couple of fresh ulcers measuring 6-7 mm in the pyloric channel  Biopsies done from the gastric body to check for H pylori  Duodenum-few fresh ulcers in the bulb and also post bulbar area where there is narrowing of the lumen from ulceration and mucosal edema and the scope was able to be passed gently    ED Triage Vitals   Temperature Pulse Respirations Blood Pressure SpO2   07/15/20 1302 07/15/20 1259 07/15/20 1259 07/15/20 1259 07/15/20 1259   99 1 °F (37 3 °C) (!) 118 16 126/65 97 %      Temp Source Heart Rate Source Patient Position - Orthostatic VS BP Location FiO2 (%)   07/15/20 1302 07/15/20 1259 07/15/20 1259 07/15/20 1259 --   Oral Monitor Lying Right arm       Pain Score       07/15/20 1259       4        Wt Readings from Last 1 Encounters:   07/16/20 84 4 kg (186 lb)     Additional Vital Signs:   07/16/20 1129  97 5 °F (36 4 °C)  91  18  156/73    98 %    None (Room air)     07/16/20 0827  97 5 °F (36 4 °C)  87    129/60  86  98 %    None (Room air)  Lying   07/16/20 0254  98 2 °F (36 8 °C)  100  18  160/70             07/16/20 0033  99 °F (37 2 °C)  111Abnormal     141/70    99 %    None (Room air)  Lying   07/16/20 0011  99 6 °F (37 6 °C)  112Abnormal   18  158/80             07/15/20 2152  98 9 °F (37 2 °C)  115Abnormal   18  159/74    98 %    None (Room air)  Lying   07/15/20 1918  99 8 °F (37 7 °C)  114Abnormal   16  185/77Abnormal   110  96 %    None (Room air)         Pertinent Labs/Diagnostic Test Results:   7/15/2020 CT abdomen No acute intra-abdominal pathology within the limits of this unenhanced examination  Uncomplicated colonic diverticulosis    7/15/2020 CxR No acute cardiopulmonary disease    Results from last 7 days   Lab Units 07/15/20  1334   SARS-COV-2  Negative     Results from last 7 days   Lab Units 07/16/20  0636 07/15/20  2149 07/15/20  1327   WBC Thousand/uL 8 20  --  9 75   HEMOGLOBIN g/dL 7 8* 6 5* 7 7*   HEMATOCRIT % 26 5* 21 1* 25 9*   PLATELETS Thousands/uL 240  --  287   NEUTROS ABS Thousands/µL 4 27  --  8 41*     Results from last 7 days   Lab Units 07/16/20  0636 07/15/20  1327   SODIUM mmol/L 138 139   POTASSIUM mmol/L 4 0 4 6   CHLORIDE mmol/L 109* 104   CO2 mmol/L 21 24   ANION GAP mmol/L 8 11   BUN mg/dL 44* 44*   CREATININE mg/dL 0 98 1 14   EGFR ml/min/1 73sq m 56 46   CALCIUM mg/dL 8 9 9 0     Results from last 7 days   Lab Units 07/15/20  1327   AST U/L 14   ALT U/L 14   ALK PHOS U/L 87   TOTAL PROTEIN g/dL 6 2*   ALBUMIN g/dL 2 9*   TOTAL BILIRUBIN mg/dL 0 22     Results from last 7 days   Lab Units 07/16/20  0832 07/16/20  0613 07/16/20  0031 07/15/20  2059   POC GLUCOSE mg/dl 145* 162* 200* 173*     Results from last 7 days   Lab Units 07/16/20  0636 07/15/20  1327   GLUCOSE RANDOM mg/dL 147* 258*     Results from last 7 days   Lab Units 07/15/20  2149   TROPONIN I ng/mL 0 06*     ED Treatment:   Medication Administration from 07/15/2020 1252 to 07/15/2020 1941       Date/Time Order Dose Route Action Comments     07/15/2020 1337 pantoprazole (PROTONIX) 80 mg in sodium chloride 0 9 % 100 mL IVPB 80 mg Intravenous New Bag      07/15/2020 1337 sodium chloride 0 9 % infusion 125 mL/hr Intravenous New Bag      07/15/2020 1541 metoclopramide (REGLAN) tablet 5 mg 5 mg Oral Given      07/15/2020 1757 oxyCODONE (ROXICODONE) IR tablet 5 mg 5 mg Oral Given      07/15/2020 1541 morphine injection 2 mg 2 mg Intravenous Given         Past Medical History:   Diagnosis Date    Acid reflux     Anxiety     Arthritis     Asthma     Basal cell carcinoma     upper lip    Chronic narcotic dependence (HCC)     Chronic pain     Colon polyp     Cystocele     Diabetes mellitus ([facility] [address] [facility]     Diverticulosis     Dysfunctional uterine bleeding     last assessed - [id number]    Fibromyalgia     Gastric ulcer     Gastroparesis     History of colonic polyps     last assessed - [id number]    History of gastroesophageal reflux (GERD)     Hypercholesterolemia     Hyperlipidemia     Hypertension     IBS (irritable bowel syndrome)     Kidney stone     Post laminectomy syndrome     Seasonal allergies     Spinal stenosis      Present on Admission:   Diabetes mellitus, type 2 (HCC)   Chronic pain disorder   Hypothyroidism      Admitting Diagnosis: Melena [K92 1]  GI bleed [K92 2]  UGI bleed [K92 2]  Hyperglycemia due to type 2 diabetes mellitus ([facility] [address] ) [E11 65]  Age/Sex: 66 y o  female  Admission Orders: 7/15/2020 1448 inpatient   Scheduled Medications:  Medications:  cyclobenzaprine 10 mg Oral HS   docusate sodium 100 mg Oral BID   gabapentin 300 mg Oral BID   insulin lispro 1-5 Units Subcutaneous Q6H [facility]   levothyroxine 37 5 mcg Oral Early Morning   pravastatin 10 mg Oral HS   pantoprazole (PROTONIX) 80 mg in sodium chloride 0 9 % 100 mL IVPB   Dose: 80 mg  Freq: Once Route: IV  Last Dose: 80 mg (07/16/20 0954)  Start: 07/16/20 0830 End: 07/16/20 1009    Continuous IV Infusions:  lactated ringers 100 mL/hr Intravenous Continuous     PRN Meds:  Acetaminophen - used x 1  650 mg Oral Q6H PRN   albuterol 2 puff Inhalation Q6H PRN   metoclopramide 10 mg Intravenous Q6H PRN   morphine injection - used x 1 (1541) 2 mg Intravenous Q4H PRN   ondansetron 4 mg Intravenous Q6H PRN   oxyCODONE - used x 2  5 mg Oral Q6H PRN   potassium chloride 20 mEq Oral Daily PRN   zolpidem 5 mg Oral HS PRN     Transfuse 1 unit PRBC  IP CONSULT TO GASTROENTEROLOGY    Network Utilization Review Department  [email or web address]  ATTENTION: Please call with any questions or concerns to [phone] and carefully listen to the prompts so that you are directed to the right person  All voicemails are confidential   Handy Villanueva all requests for admission clinical reviews, approved or denied determinations and any other requests to dedicated fax number below belonging to the campus where the patient is receiving treatment   List of dedicated fax numbers for the Facilities:  [address] FAX NUMBER   ADMISSION DENIALS (Administrative/Medical Necessity) [address] [facility] (Maternity/NICU/Pediatrics) [phone]   ST  [address] [facility] [phone]   Abigail Lincoln [phone]   Cullen Opitz [phone]   [address] [phone]   [facility]  [phone] [address] [facility]  [address] [facility] [address] [facility] [phone]

## 2020-07-16 NOTE — CONSULTS
Consultation - [facility]  Jordyn Bowman 66 y o  female MRN: [id number]  Unit/Bed#: S -01 Encounter: [id number]         Reason for Consult / Principal Problem:  Episode of coffee-ground emesis, 4 episodes of melena    HPI:  Damari Bocanegra is a 76-year-old female with history of type 2 diabetes, hypertension, hyperlipidemia and dysphagia secondary to peptic stricture requiring dilation the past   Patient presenting to the emergency room yesterday for 1 episode of coffee-ground emesis around 10:30 AM yesterday  This was followed by 4 episodes of reported melena  On admission, her hemoglobin was 7 7  BUN elevated at 44  Slightly tachycardic at 118  CT without contrast was unremarkable  When asked about NSAID use, the patient reports that she has been taking about 2 naproxen about 2 times weekly for back pain  She denies increased pyrosis, regurgitation, dysphagia or odynophagia  Patient's last colonoscopy was in 2016  Diverticulosis noted  Internal hemorrhoids as well  She is likely due for 5 year recall secondary to personal history of polyps  Her last EGD was in September 2016 for recurrent peptic stricture  Dilation was performed at that time  To her knowledge, she does not have history of ulcer  Review of Systems:    CONSTITUTIONAL: Denies any fever, chills, or rigors  Good appetite, and no recent weight loss  HEENT: No earache or tinnitus  Denies hearing loss or visual disturbances  CARDIOVASCULAR: No chest pain or palpitations  RESPIRATORY: Denies any cough, hemoptysis, shortness of breath or dyspnea on exertion  GASTROINTESTINAL: As noted in the History of Present Illness  GENITOURINARY: No problems with urination  Denies any hematuria or dysuria  NEUROLOGIC: No dizziness or vertigo, denies headaches  MUSCULOSKELETAL: Denies any muscle or joint pain  SKIN: Denies skin rashes or itching     ENDOCRINE: Denies excessive thirst  Denies intolerance to heat or cold   PSYCHOSOCIAL: Denies depression or anxiety  Denies any recent memory loss  Historical Information   Past Medical History:   Diagnosis Date    Acid reflux     Anxiety     Arthritis     Asthma     Basal cell carcinoma     upper lip    Chronic narcotic dependence (HCC)     Chronic pain     Colon polyp     Cystocele     Diabetes mellitus ([facility] [address] )     Diverticulosis     Dysfunctional uterine bleeding     last assessed - [id number]    Fibromyalgia     Gastric ulcer     Gastroparesis     History of colonic polyps     last assessed - [id number]    History of gastroesophageal reflux (GERD)     Hypercholesterolemia     Hyperlipidemia     Hypertension     IBS (irritable bowel syndrome)     Kidney stone     Post laminectomy syndrome     Seasonal allergies     Spinal stenosis      Past Surgical History:   Procedure Laterality Date    APPENDECTOMY      BACK SURGERY      BREAST CYST ASPIRATION Left     pt unsure what exactly was removed    CHOLECYSTECTOMY      ESOPHAGOGASTRODUODENOSCOPY N/A 9/28/2016    Procedure: ESOPHAGOGASTRODUODENOSCOPY (EGD); Surgeon: Carson Duran MD;  [facility]; Service:     HERNIA REPAIR      HYSTERECTOMY      TTAH-BSO age 25   Delcie Rojelio LAMINECTOMY      LUMBAR LAMINECTOMY      OOPHORECTOMY      age 25   Delcie Rojelio ID ARTHRODESIS POSTERIOR/POSTEROLATERAL THORACIC N/A 6/4/2018    Procedure: Reopening of lumbar incision for T12-L5 posterior instrumented fixation and fusion and T12-L4 posterior decompression;  Surgeon: Terri Mclaughlin MD;  [facility];  Service: Neurosurgery    ID COLONOSCOPY FLX DX W/COLLJ SPEC WHEN PFRMD N/A 3/2/2016    Procedure: EGD AND COLONOSCOPY;  Surgeon: Carson Duran MD;  [facility]; Service: Gastroenterology    ID DILATE ESOPHAGUS N/A 9/28/2016    Procedure: DILATATION ESOPHAGEAL;  Surgeon: Carson Duran MD;  [facility];   Service: Gastroenterology    ID ESOPHAGOGASTRODUODENOSCOPY TRANSORAL DIAGNOSTIC N/A 7/18/2016 Procedure: ESOPHAGOGASTRODUODENOSCOPY (EGD); Surgeon: Jennifer Ramirez MD;  [facility];   Service: Gastroenterology    GA IMPLANT SPINAL NEUROSTIM/ Left 2018    Procedure: removal of left buttock implantable pulse generator and placement of new  implantable pulse generator;  Surgeon: Belinda Neville MD;  [facility];  Service: Neurosurgery     Social History   Social History     Substance and Sexual Activity   Alcohol Use No    Comment: Denied history of alcohol use     Social History     Substance and Sexual Activity   Drug Use No    Comment: Denied history of drug use     Social History     Tobacco Use   Smoking Status Former Smoker    Last attempt to quit: 1970    Years since quittin 5   Smokeless Tobacco Never Used   Tobacco Comment    Denied history of current ever day smoker, Former smoker and Never smoker all documented in [employer]     Family History   Problem Relation Age of Onset    Lung cancer Mother 55    Pulmonary embolism Father     Stroke Maternal Grandmother     Heart attack Maternal Grandfather     No Known Problems Paternal Grandmother     No Known Problems Paternal Grandfather     Diabetes Family         Diabetes mellitus    Hypertension Family     Stroke Family         Stroke complications    No Known Problems Daughter     No Known Problems Daughter     No Known Problems Sister     No Known Problems Maternal Aunt         Meds/Allergies     Current Facility-Administered Medications   Medication Dose Route Frequency    acetaminophen (TYLENOL) tablet 650 mg  650 mg Oral Q6H PRN    albuterol (PROVENTIL HFA,VENTOLIN HFA) inhaler 2 puff  2 puff Inhalation Q6H PRN    cyclobenzaprine (FLEXERIL) tablet 10 mg  10 mg Oral HS    docusate sodium (COLACE) capsule 100 mg  100 mg Oral BID    gabapentin (NEURONTIN) capsule 300 mg  300 mg Oral BID    insulin lispro (HumaLOG) 100 units/mL subcutaneous injection 1-5 Units  1-5 Units Subcutaneous Q6H [address]    lactated ringers infusion  100 mL/hr Intravenous Continuous    levothyroxine tablet 37 5 mcg  37 5 mcg Oral Early Morning    metoclopramide (REGLAN) tablet 5 mg  5 mg Oral TID    morphine injection 2 mg  2 mg Intravenous Q4H PRN    ondansetron (ZOFRAN) injection 4 mg  4 mg Intravenous Q6H PRN    oxyCODONE (ROXICODONE) IR tablet 5 mg  5 mg Oral Q6H PRN    pantoprazole (PROTONIX) 80 mg in sodium chloride 0 9 % 100 mL IVPB  80 mg Intravenous Once    potassium chloride (K-DUR,KLOR-CON) CR tablet 20 mEq  20 mEq Oral Daily PRN    pravastatin (PRAVACHOL) tablet 10 mg  10 mg Oral HS    zolpidem (AMBIEN) tablet 5 mg  5 mg Oral HS PRN       Allergies   Allergen Reactions    Penicillins Anaphylaxis and Other (See Comments)     Other reaction(s): Unknown Reaction    Sulfa Antibiotics Anaphylaxis and Other (See Comments)     Other reaction(s): Unknown Reaction    Aspartame Other (See Comments) and Hypertension     Slurred speech, weakness, stroke sx    Iodinated Diagnostic Agents Hives     Pt has taken prep prior for contrast and has not had any break through reaction    Keflex [Cephalexin]          Objective     Blood pressure 160/70, pulse 100, temperature 98 2 °F (36 8 °C), temperature source Oral, resp  rate 18, weight 84 7 kg (186 lb 11 7 oz), SpO2 99 %, not currently breastfeeding  Intake/Output Summary (Last 24 hours) at 7/16/2020 0828  Last data filed at 7/16/2020 0617  Gross per 24 hour   Intake 400 ml   Output 150 ml   Net 250 ml         PHYSICAL EXAM:      General Appearance:   Alert and oriented x 3  Cooperative, and in no respiratory distress   HEENT:   Normocephalic, atraumatic, anicteric      Neck:  Supple, symmetrical, trachea midline   Lungs:   Clear to auscultation bilaterally; no rales, rhonchi or wheezing; respirations unlabored    Heart[de-identified]   S1 and S2 normal; regular rate and rhythm; no murmur, rub, or gallop     Abdomen:   Soft, non-tender, non-distended; normal bowel sounds; no masses, no organomegaly    Genitalia:   Deferred    Rectal:   Deferred    Extremities:  No cyanosis, clubbing or edema    Pulses:  2+ and symmetric all extremities    Skin:  Skin color, texture, turgor normal, no rashes or lesions    Lymph nodes:  No palpable cervical or supraclavicular lymphadenopathy        Lab Results:   Results from last 7 days   Lab Units 07/16/20  0636   WBC Thousand/uL 8 20   HEMOGLOBIN g/dL 7 8*   HEMATOCRIT % 26 5*   PLATELETS Thousands/uL 240   NEUTROS PCT % 52   LYMPHS PCT % 34   MONOS PCT % 11   EOS PCT % 2     Results from last 7 days   Lab Units 07/16/20  0636 07/15/20  1327   POTASSIUM mmol/L 4 0 4 6   CHLORIDE mmol/L 109* 104   CO2 mmol/L 21 24   BUN mg/dL 44* 44*   CREATININE mg/dL 0 98 1 14   CALCIUM mg/dL 8 9 9 0   ALK PHOS U/L  --  87   ALT U/L  --  14   AST U/L  --  14               Imaging Studies: I have personally reviewed pertinent imaging studies  Ct Abdomen Pelvis Wo Contrast    Result Date: 7/15/2020  Impression: No acute intra-abdominal pathology within the limits of this unenhanced examination  Uncomplicated colonic diverticulosis  Workstation performed: [id number]     Xr Chest Portable    Result Date: 7/15/2020  Impression: No acute cardiopulmonary disease  Workstation performed: [id number]       ASSESSMENT and PLAN:      1) Acute blood-loss anemia in the setting of coffee-ground emesis and melena - Patient reports that she has had no vomiting or passage of dark stool since she came to the hospital yesterday  Hemoglobin is currently 7 8  She is no longer tachycardic  Otherwise, hemodynamically stable  She does have positive NSAID use  - EGD to investigate today  - If above negative, colonoscopy  - Continue PPI drip  - Further recommendations based on above findings    The patient was seen and examined by Dr Gloria Wheat, all key medical decisions were made with Dr Gloria Wheat  Thank you for allowing us to participate in the care of this pleasant patient    We will follow up with you closely

## 2020-07-16 NOTE — PROGRESS NOTES
Progress Note - Demond Guardian 1945, 66 y o  female MRN: [id number]  Unit/Bed#: S -01 Encounter: [id number]  Primary Care Provider: NANY Guadalupe   Date and time admitted to hospital: 7/15/2020 12:56 PM    * Melena  Assessment & Plan  · Melanotic stools, Hemoccult positive  Also had emesis morning of admission  · CT abdomen done, unfortunately without contrast due to dye allergy  · BUN elevated  · Was started on Protonix drip  · S/p EGD with colonoscopy  She has duodenal ulcers  · Discussed with GI  Monitor hemoglobin overnight  · B i d  PPI and Carafate 4x daily   · Will give additional unit PRBC on 7/16/2020 as patient is weak and hemoglobin is still below baseline  Right knee pain  Assessment & Plan  Patient undergoing workup for R knee pain as an outpatient  Ulcers are likely 2/2 NSAIDs that patient has taken for knee pain  Daughter Madison Price, who is an MD, wonders if knee brace would help  I will throw in PT/OT    Elevated troponin  Assessment & Plan  Elevated troponin level, the setting of anemia and hypovolemia  EKG on admission was nonischemic  She denies chest pain  Tachycardia  Assessment & Plan  Likely hypovolemic  Chest x-ray checked  Tachycardia improved s/p 100cc/hr NS IVF x 12 hours  Will reduce to 75cc/hr for 12 more hours  S/P insertion of spinal cord stimulator  Assessment & Plan  Placed years ago, without complication  Hypothyroidism  Assessment & Plan  Continue home Levothyroxine     Chronic pain disorder  Assessment & Plan  Takes Percocet p r n  David Manifold Provide IV options while patient is NPO  Diabetes mellitus, type 2 [facility])  Assessment & Plan  Lab Results   Component Value Date    HGBA1C 6 0 01/13/2020       Recent Labs     07/16/20  0031 07/16/20  0613 07/16/20  0832 07/16/20  1523   POCGLU 200* 162* 145* 141*       Blood Sugar Average: Last 72 hrs:  (P) 164 2 hold metformin  ISS    Schedule Accu-Cheks q 6 while she is NPO; t i d  A c now that she is back on diet  VTE Pharmacologic Prophylaxis:   Pharmacologic: Pharmacologic VTE Prophylaxis contraindicated due to GI bleed  Mechanical VTE Prophylaxis in Place: Yes    Patient Centered Rounds: I have performed bedside rounds with nursing staff today  Discussions with Specialists or Other Care Team Provider:  Discussed with Gastroenterology    Education and Discussions with Family / Patient:  Discussed with patient's daughterAdin  Time Spent for Care: 45 minutes  More than 50% of total time spent on counseling and coordination of care as described above  Current Length of Stay: 1 day(s)    Current Patient Status: Inpatient   Certification Statement: The patient will continue to require additional inpatient hospital stay due to Monitoring hemoglobin overnight    Discharge Plan:  Pending, hopeful DC in a m  Code Status: Level 1 - Full Code      Subjective:   Patient feels weak  No chest pain  No abdominal pain  No other complaints  Objective:     Vitals:   Temp (24hrs), Av 5 °F (36 9 °C), Min:97 5 °F (36 4 °C), Max:99 8 °F (37 7 °C)    Temp:  [97 5 °F (36 4 °C)-99 8 °F (37 7 °C)] 98 1 °F (36 7 °C)  HR:  [] 94  Resp:  [16-18] 16  BP: (120-185)/(60-80) 120/78  SpO2:  [96 %-100 %] 99 %  Body mass index is 36 33 kg/m²  Input and Output Summary (last 24 hours): Intake/Output Summary (Last 24 hours) at 2020 1700  Last data filed at 2020 1624  Gross per 24 hour   Intake 1350 ml   Output 225 ml   Net 1125 ml       Physical Exam:     Physical Exam   Constitutional: She is oriented to person, place, and time  She appears well-developed and well-nourished  No distress  HENT:   Head: Normocephalic and atraumatic  Eyes: Right eye exhibits no discharge  Left eye exhibits no discharge  No scleral icterus  Cardiovascular: Normal rate and regular rhythm  Exam reveals no gallop and no friction rub  No murmur heard    Pulmonary/Chest: Effort normal and breath sounds normal  No respiratory distress  She has no wheezes  She has no rales  Abdominal: Soft  Bowel sounds are normal  She exhibits no distension  There is no tenderness  Neurological: She is alert and oriented to person, place, and time  Skin: Skin is warm and dry  Nursing note and vitals reviewed  Additional Data:     Labs:    Results from last 7 days   Lab Units 07/16/20  1450 07/16/20  0636   WBC Thousand/uL  --  8 20   HEMOGLOBIN g/dL 7 4* 7 8*   HEMATOCRIT % 24 2* 26 5*   PLATELETS Thousands/uL  --  240   NEUTROS PCT %  --  52   LYMPHS PCT %  --  34   MONOS PCT %  --  11   EOS PCT %  --  2     Results from last 7 days   Lab Units 07/16/20  0636 07/15/20  1327   SODIUM mmol/L 138 139   POTASSIUM mmol/L 4 0 4 6   CHLORIDE mmol/L 109* 104   CO2 mmol/L 21 24   BUN mg/dL 44* 44*   CREATININE mg/dL 0 98 1 14   ANION GAP mmol/L 8 11   CALCIUM mg/dL 8 9 9 0   ALBUMIN g/dL  --  2 9*   TOTAL BILIRUBIN mg/dL  --  0 22   ALK PHOS U/L  --  87   ALT U/L  --  14   AST U/L  --  14   GLUCOSE RANDOM mg/dL 147* 258*         Results from last 7 days   Lab Units 07/16/20  1523 07/16/20  0832 07/16/20  0613 07/16/20  0031 07/15/20  2059   POC GLUCOSE mg/dl 141* 145* 162* 200* 173*                   * I Have Reviewed All Lab Data Listed Above  * Additional Pertinent Lab Tests Reviewed:  All Labs Within Last 24 Hours Reviewed      Recent Cultures (last 7 days):           Last 24 Hours Medication List:     Current Facility-Administered Medications:  acetaminophen 650 mg Oral Q6H PRN William Rao MD    albuterol 2 puff Inhalation Q6H PRN William Rao MD    cyclobenzaprine 10 mg Oral HS William Rao MD    docusate sodium 100 mg Oral BID William Rao MD    gabapentin 300 mg Oral BID William Rao MD    [START ON 7/17/2020] insulin lispro 1-5 Units Subcutaneous TID With Meals Camryn Hunter PA-C    lactated ringers 50 mL/hr Intravenous Continuous Camryn Hunter PA-C Last Rate: 100 mL/hr (07/16/20 1624) levothyroxine 37 5 mcg Oral Early Morning Rex Kennedy MD    metoclopramide 10 mg Intravenous Q6H PRN Rex Kennedy MD    morphine injection 2 mg Intravenous Q4H PRN Rex Kennedy MD    ondansetron 4 mg Intravenous Q6H PRN Rex Kennedy MD    oxyCODONE 5 mg Oral Q6H PRN Rex Kennedy MD    potassium chloride 20 mEq Oral Daily PRN Rex Kennedy MD    pravastatin 10 mg Oral HS Rex Kennedy MD    sucralfate 1,000 mg Oral Q6H Priti Rodriguez MD    zolpidem 5 mg Oral HS PRELIZABETH Kennedy MD         Today, Patient Was Seen By: Gustabo Lynn PA-C    ** Please Note: Dictation voice to text software may have been used in the creation of this document   **

## 2020-07-16 NOTE — PLAN OF CARE
Problem: Potential for Falls  Goal: Patient will remain free of falls  Description  INTERVENTIONS:  - Assess patient frequently for physical needs  -  Identify cognitive and physical deficits and behaviors that affect risk of falls    -  [place] fall precautions as indicated by assessment   - Educate patient/family on patient safety including physical limitations  - Instruct patient to call for assistance with activity based on assessment  - Modify environment to reduce risk of injury  - Consider OT/PT consult to assist with strengthening/mobility  Outcome: Progressing     Problem: PAIN - ADULT  Goal: Verbalizes/displays adequate comfort level or baseline comfort level  Description  Interventions:  - Encourage patient to monitor pain and request assistance  - Assess pain using appropriate pain scale  - Administer analgesics based on type and severity of pain and evaluate response  - Implement non-pharmacological measures as appropriate and evaluate response  - Consider cultural and social influences on pain and pain management  - Notify physician/advanced practitioner if interventions unsuccessful or patient reports new pain  Outcome: Progressing     Problem: INFECTION - ADULT  Goal: Absence or prevention of progression during hospitalization  Description  INTERVENTIONS:  - Assess and monitor for signs and symptoms of infection  - Monitor lab/diagnostic results  - Monitor all insertion sites, i e  indwelling lines, tubes, and drains  - Monitor endotracheal if appropriate and nasal secretions for changes in amount and color  - [place] appropriate cooling/warming therapies per order  - Administer medications as ordered  - Instruct and encourage patient and family to use good hand hygiene technique  - Identify and instruct in appropriate isolation precautions for identified infection/condition  Outcome: Progressing     Problem: SAFETY ADULT  Goal: Maintain or return to baseline ADL function  Description  INTERVENTIONS:  -  Assess patient's ability to carry out ADLs; assess patient's baseline for ADL function and identify physical deficits which impact ability to perform ADLs (bathing, care of mouth/teeth, toileting, grooming, dressing, etc )  - Assess/evaluate cause of self-care deficits   - Assess range of motion  - Assess patient's mobility; develop plan if impaired  - Assess patient's need for assistive devices and provide as appropriate  - Encourage maximum independence but intervene and supervise when necessary  - Involve family in performance of ADLs  - Assess for home care needs following discharge   - Consider OT consult to assist with ADL evaluation and planning for discharge  - Provide patient education as appropriate  Outcome: Progressing  Goal: Maintain or return mobility status to optimal level  Description  INTERVENTIONS:  - Assess patient's baseline mobility status (ambulation, transfers, stairs, etc )    - Identify cognitive and physical deficits and behaviors that affect mobility  - Identify mobility aids required to assist with transfers and/or ambulation (gait belt, sit-to-stand, lift, walker, cane, etc )  - [place] fall precautions as indicated by assessment  - Record patient progress and toleration of activity level on Mobility SBAR; progress patient to next Phase/Stage  - Instruct patient to call for assistance with activity based on assessment  - Consider rehabilitation consult to assist with strengthening/weightbearing, etc   Outcome: Progressing     Problem: DISCHARGE PLANNING  Goal: Discharge to home or other facility with appropriate resources  Description  INTERVENTIONS:  - Identify barriers to discharge w/patient and caregiver  - Arrange for needed discharge resources and transportation as appropriate  - Identify discharge learning needs (meds, wound care, etc )  - Arrange for interpretive services to assist at discharge as needed  - Refer to Case Management Department for coordinating discharge planning if the patient needs post-hospital services based on physician/advanced practitioner order or complex needs related to functional status, cognitive ability, or social support system  Outcome: Progressing

## 2020-07-17 ENCOUNTER — TELEPHONE (OUTPATIENT)
Dept: FAMILY MEDICINE CLINIC | Facility: CLINIC | Age: 75
End: 2020-07-17

## 2020-07-17 VITALS
OXYGEN SATURATION: 97 % | TEMPERATURE: 97.7 F | WEIGHT: 184.53 LBS | DIASTOLIC BLOOD PRESSURE: 82 MMHG | BODY MASS INDEX: 36.23 KG/M2 | HEART RATE: 95 BPM | SYSTOLIC BLOOD PRESSURE: 140 MMHG | RESPIRATION RATE: 18 BRPM | HEIGHT: 60 IN

## 2020-07-17 DIAGNOSIS — M79.7 FIBROMYALGIA: ICD-10-CM

## 2020-07-17 PROBLEM — R00.0 TACHYCARDIA: Status: RESOLVED | Noted: 2020-07-15 | Resolved: 2020-07-17

## 2020-07-17 LAB
ABO GROUP BLD BPU: NORMAL
BPU ID: NORMAL
CROSSMATCH: NORMAL
GLUCOSE SERPL-MCNC: 131 MG/DL (ref 65–140)
GLUCOSE SERPL-MCNC: 215 MG/DL (ref 65–140)
HCT VFR BLD AUTO: 26.1 % (ref 34.8–46.1)
HCT VFR BLD AUTO: 29.4 % (ref 34.8–46.1)
HGB BLD-MCNC: 8.3 G/DL (ref 11.5–15.4)
HGB BLD-MCNC: 9.2 G/DL (ref 11.5–15.4)
UNIT DISPENSE STATUS: NORMAL
UNIT PRODUCT CODE: NORMAL
UNIT RH: NORMAL

## 2020-07-17 PROCEDURE — 85014 HEMATOCRIT: CPT | Performed by: INTERNAL MEDICINE

## 2020-07-17 PROCEDURE — 99232 SBSQ HOSP IP/OBS MODERATE 35: CPT | Performed by: INTERNAL MEDICINE

## 2020-07-17 PROCEDURE — 97110 THERAPEUTIC EXERCISES: CPT

## 2020-07-17 PROCEDURE — 85018 HEMOGLOBIN: CPT | Performed by: INTERNAL MEDICINE

## 2020-07-17 PROCEDURE — 99239 HOSP IP/OBS DSCHRG MGMT >30: CPT | Performed by: PHYSICIAN ASSISTANT

## 2020-07-17 PROCEDURE — 82948 REAGENT STRIP/BLOOD GLUCOSE: CPT

## 2020-07-17 PROCEDURE — 97163 PT EVAL HIGH COMPLEX 45 MIN: CPT

## 2020-07-17 PROCEDURE — 97116 GAIT TRAINING THERAPY: CPT

## 2020-07-17 RX ORDER — PANTOPRAZOLE SODIUM 40 MG/1
40 TABLET, DELAYED RELEASE ORAL
Qty: 60 TABLET | Refills: 2 | Status: SHIPPED | OUTPATIENT
Start: 2020-07-17 | End: 2021-07-22

## 2020-07-17 RX ORDER — OXYCODONE HYDROCHLORIDE 10 MG/1
10 TABLET ORAL EVERY 6 HOURS PRN
Status: DISCONTINUED | OUTPATIENT
Start: 2020-07-17 | End: 2020-07-17 | Stop reason: HOSPADM

## 2020-07-17 RX ORDER — SUCRALFATE ORAL 1 G/10ML
1000 SUSPENSION ORAL EVERY 6 HOURS SCHEDULED
Qty: 420 ML | Refills: 0 | Status: SHIPPED | OUTPATIENT
Start: 2020-07-17 | End: 2020-08-07 | Stop reason: SDUPTHER

## 2020-07-17 RX ORDER — ACETAMINOPHEN 325 MG/1
650 TABLET ORAL EVERY 6 HOURS PRN
Qty: 30 TABLET | Refills: 0
Start: 2020-07-17 | End: 2020-09-21 | Stop reason: ALTCHOICE

## 2020-07-17 RX ADMIN — OXYCODONE HYDROCHLORIDE 5 MG: 5 TABLET ORAL at 09:00

## 2020-07-17 RX ADMIN — GABAPENTIN 300 MG: 300 CAPSULE ORAL at 09:01

## 2020-07-17 RX ADMIN — LEVOTHYROXINE SODIUM 37.5 MCG: 75 TABLET ORAL at 06:16

## 2020-07-17 RX ADMIN — ACETAMINOPHEN 650 MG: 325 TABLET, FILM COATED ORAL at 08:28

## 2020-07-17 RX ADMIN — SUCRALFATE 1000 MG: 1 SUSPENSION ORAL at 06:16

## 2020-07-17 RX ADMIN — OXYCODONE HYDROCHLORIDE 5 MG: 5 TABLET ORAL at 00:23

## 2020-07-17 RX ADMIN — SUCRALFATE 1000 MG: 1 SUSPENSION ORAL at 11:14

## 2020-07-17 NOTE — PHYSICAL THERAPY NOTE
PHYSICAL THERAPY EVALUATION NOTE  Patient Name: Jorge Georges  TFZOD'B Date: 20 3221   Note Type   Note type Eval/Treat   Pain Assessment   Pain Assessment Tool 0-10   Pain Score 8   Pain Location/Orientation Orientation: Right;Location: Knee   Pain Radiating Towards Pain radiates from low back distal to right foot   Pain Onset/Description Onset: Ongoing;Frequency: Constant/Continuous; Descriptor: Discomfort   Effect of Pain on Daily Activities Pain limits patient's ability to perform functional transfers such as sit <> stand and ambulation   Patient's Stated Pain Goal No pain   Hospital Pain Intervention(s) Repositioned;Elevated; Emotional support;Relaxation technique; Rest   Home Living   Type of [address] One level;Stairs to enter with rails  (3 ELLYN w/ B rail)   Home Equipment Walker;Cane   Prior Function   Level of Ada Independent with ADLs and functional mobility   Lives With Son   Receives Help From [facility] in the last 6 months 1 to 4   Comments Patient amb with cane prior to admission  Restrictions/Precautions   Weight Bearing Precautions Per Order No   Braces or Orthoses Other (Comment)  (Denies)   Other Precautions Chair Alarm; Bed Alarm;Multiple lines; Fall Risk;Pain   General   Additional Pertinent History Cleared for PT by nursing   Family/Caregiver Present No   Cognition   Overall Cognitive Status WFL   Arousal/Participation Alert   Orientation Level Oriented to person  (identified with full name and )   Following Commands Follows one step commands with increased time or repetition   Comments Patient observed supine in bed  Patient consented to PT evaluation  Patient reports 8/10 pain in her right knee      RUE Assessment   RUE Assessment WFL  (AROM)   LUE Assessment   LUE Assessment WFL  (AROM)   RLE Assessment   RLE Assessment X  (limited AROM at knee/hip due to knee pain; ankle WFL)   LLE Assessment   LLE Assessment WFL  (AROM)   Light Touch   RLE Light Touch Grossly intact   LLE Light Touch Grossly intact   Bed Mobility   Supine to Sit 5  Supervision   Additional items Assist x 1;HOB elevated; Bedrails; Increased time required;Verbal cues   Sit to Supine 4  Minimal assistance   Additional items Assist x 1; Increased time required;Verbal cues;LE management  (Physical assist with R LE)   Transfers   Sit to Stand 4  Minimal assistance   Additional items Assist x 1; Increased time required;Armrests; Verbal cues   Stand to Sit 4  Minimal assistance   Additional items Assist x 1; Increased time required;Armrests; Verbal cues   Additional Comments Patient stood for 30 seconds with RW and minAx1  30 second chair stand: 4 (<10 indicates increased risk for falls)  Patient positioned in chair at end of session with chair alarm on, LE elevated, and call bell within reach  Ambulation/Elevation   Gait pattern Forward Flexion; Wide RUBIN; Decreased foot clearance;Decreased R stance; Excessively slow   Gait Assistance 4  Minimal assist   Additional items Assist x 1   Assistive Device Rolling walker   Distance 4 ft; seated rest break; further amb during treatment   Stair Management Assistance Not tested  (due to poor standing tolerance)   Balance   Static Sitting Fair -   Static Standing Poor +  (w/ RW)   Ambulatory Poor +  (w/ RW)   Activity Tolerance   Activity Tolerance Patient limited by fatigue;Patient limited by pain   Medical Staff [address]   Nurse Made Aware Marylene Maryland RN   Assessment   Prognosis Fair   Problem List Decreased strength;Decreased range of motion;Decreased endurance; Impaired balance;Decreased mobility; Decreased safety awareness;Pain   Assessment Patient is a 66year old female who was admitted to the ED with abdominal pain, dark liquid stool, and vomitus coffee ground emesis   Patient diagnosis includes: Melena, tachycardia, S/P insertion of spinal cord stimulator, hypothyroidism, chornic pain, DM, Right knee pain, and elevated troponin  The following comorbitities affect patient's plan of care: anxiety, arthritis, asthma, basal cell carcinoma, chornic narcotic dependence, fibromyalgia, HTN, spinal stensosis  Patient presents with the following personal factors: inaccessible home environment with steps to enter, positive fall history, unable to ambulate household distances, and ambulates with cane at baseline  Patient's current clinical presentation is unstable/unpredictable evidenced by: increased pain that limits patient's mobility, requires verbal input for safety awareness, requires physical assistance for mobility, abnormal lab values, and overall functional decline (minAx1 with RW for amb vs previously independent with cane)  Patient's impairments include:decreased strength (RLE > LLE), decreased ROM (RLE > LLE), decreased endurance, impaired balance, decreased safety awareness, increased pain that limits mobility, and overall functional decline (minAx1 with RW for amb vs previously independent with cane)  Patient impairment also evidenced in Barthel Index score of 40/100 indicating decreased independence  Impairment also evident in 30 second chair stand: 4 (<10 indicates patient is at increased risk for falls)  Will continue to follow patient in order to progress mobilization and maximize patient independence  Recommend rehab pending patient progress and when medically cleared, however, will continue to monitor patient throughout admission  Patient would benefit from ortho consult in order to address patient's pain back  Patient would benefit from OT consult in order to address ADL adaptations     Barriers to Discharge Inaccessible home environment   Goals   Patient Goals "get my legs strong enough to walk"   [facility] Expiration Date 07/27/20   Short Term Goal #1 In 7-10 days patient will perform bed mobility independently in order to allow for independence within home  Patient will perform sit <> stand transfers with modified independence in order to safely transition at home and initiate patient's goal to go home and scrapbook/arrange flowers  Patient will ambulate 150 ft with least restrictive device and modified independence in order to navigate home and achieve patient's goal to get her legs stronger enough to walk  Patient will improve 30 second chair stand to >/= 10 in order to decrease fall risk and burden of care  Patient will improve Barthel Index Score to 60/100 in order to maximize patient's independence  PT to see when stair negotiation is appropriate  PT Treatment Day 1   Plan   Treatment/Interventions Functional transfer training;LE strengthening/ROM; Therapeutic exercise; Endurance training;Patient/family training;Bed mobility; Equipment eval/education;Gait training; Compensatory technique education  (PT to see when stair training is appropriate)   PT Frequency 5x/wk   Recommendation   PT Discharge Recommendation Post-Acute Rehabilitation Services   Equipment Recommended Walker   Additional Comments Provided education on hinge knee brace including increased stability for her right knee  Patient denied brace and stated she wanted to try to walk without the brace  Additional Comments 2 Patient would benefit from ortho consult in order to address patient's pain back  Patient would benefit from OT consult in order to address ADL adaptations     Barthel Index   Feeding 10   Bathing 0   Grooming Score 0   Dressing Score 5   Bladder Score 0   Bowels Score 10   Toilet Use Score 5   Transfers (Bed/Chair) Score 10   Mobility (Level Surface) Score 0   Stairs Score 0   Barthel Index Score 40     30 Second Sit to Stand Norms    30 Second Sit to Stand: 4    Age: 63-61 y/o   Male: <14   Female: <12  Age: 65-72 y/o   Male: <12   Female: <11  Age: 76-70 y/o   Male: <12   Female: <10  Age: 71-76 y/o   Male: <11   Female: <10  Age 80-79 y/o   Male: <10   Female: <9  Age 80-81 y/o   Male: <8   Female: <8  Age 80-81 y/o   Male: <7   Female: <4      Less than the number indicated for the appropriate age and gender suggests that patient is at an increased risk of falls  These norms are also taken w/ patients who do not use upper extremities to assist to get up from the chair  DANIEL Herndon         PHYSICAL THERAPY TREATMENT NOTE    Time in: 09:53  Time out: 10:12     S: Patient agreeable to PT treatment  Patient reports 8/10 pain in right knee      O: Patient amb 15 ft with RW and minAx1; seated rest break; amb 10 ft with RW and minAx1  Patient performed sit <> stand transfers with minAx1 for 2 trials (1 minute each trial)  Patient performed the following seated therapeutic exercises: 1x10 ankle pumps, 1x10 LAQs, 1x10 marching  Provided patient with handout to expedite learning      A: Focused session on RW education  Educated patient to utilize step-to gait pattern in order to decreased weightbearing on R LE and alleviate pain with ambulation  Patient reported decrease in pain when utilizing step to gait pattern with RW  Patient demonstrated improved ambulation distance evidenced by increase in distance (5 ft versus 15 ft during treatment)  Will continue to follow patient in order to progress mobilization and maximize patient independence     P: Recommend rehab pending patient progress and when medically cleared, however, will continue to monitor patient throughout admission      DANIEL Herndon

## 2020-07-17 NOTE — PLAN OF CARE
Problem: Potential for Falls  Goal: Patient will remain free of falls  Description  INTERVENTIONS:  - Assess patient frequently for physical needs  -  Identify cognitive and physical deficits and behaviors that affect risk of falls    -  [place] fall precautions as indicated by assessment   - Educate patient/family on patient safety including physical limitations  - Instruct patient to call for assistance with activity based on assessment  - Modify environment to reduce risk of injury  - Consider OT/PT consult to assist with strengthening/mobility  Outcome: Progressing     Problem: PAIN - ADULT  Goal: Verbalizes/displays adequate comfort level or baseline comfort level  Description  Interventions:  - Encourage patient to monitor pain and request assistance  - Assess pain using appropriate pain scale  - Administer analgesics based on type and severity of pain and evaluate response  - Implement non-pharmacological measures as appropriate and evaluate response  - Consider cultural and social influences on pain and pain management  - Notify physician/advanced practitioner if interventions unsuccessful or patient reports new pain  Outcome: Progressing     Problem: INFECTION - ADULT  Goal: Absence or prevention of progression during hospitalization  Description  INTERVENTIONS:  - Assess and monitor for signs and symptoms of infection  - Monitor lab/diagnostic results  - Monitor all insertion sites, i e  indwelling lines, tubes, and drains  - Monitor endotracheal if appropriate and nasal secretions for changes in amount and color  - [place] appropriate cooling/warming therapies per order  - Administer medications as ordered  - Instruct and encourage patient and family to use good hand hygiene technique  - Identify and instruct in appropriate isolation precautions for identified infection/condition  Outcome: Progressing     Problem: SAFETY ADULT  Goal: Maintain or return to baseline ADL function  Description  INTERVENTIONS:  -  Assess patient's ability to carry out ADLs; assess patient's baseline for ADL function and identify physical deficits which impact ability to perform ADLs (bathing, care of mouth/teeth, toileting, grooming, dressing, etc )  - Assess/evaluate cause of self-care deficits   - Assess range of motion  - Assess patient's mobility; develop plan if impaired  - Assess patient's need for assistive devices and provide as appropriate  - Encourage maximum independence but intervene and supervise when necessary  - Involve family in performance of ADLs  - Assess for home care needs following discharge   - Consider OT consult to assist with ADL evaluation and planning for discharge  - Provide patient education as appropriate  Outcome: Progressing  Goal: Maintain or return mobility status to optimal level  Description  INTERVENTIONS:  - Assess patient's baseline mobility status (ambulation, transfers, stairs, etc )    - Identify cognitive and physical deficits and behaviors that affect mobility  - Identify mobility aids required to assist with transfers and/or ambulation (gait belt, sit-to-stand, lift, walker, cane, etc )  - [place] fall precautions as indicated by assessment  - Record patient progress and toleration of activity level on Mobility SBAR; progress patient to next Phase/Stage  - Instruct patient to call for assistance with activity based on assessment  - Consider rehabilitation consult to assist with strengthening/weightbearing, etc   Outcome: Progressing     Problem: DISCHARGE PLANNING  Goal: Discharge to home or other facility with appropriate resources  Description  INTERVENTIONS:  - Identify barriers to discharge w/patient and caregiver  - Arrange for needed discharge resources and transportation as appropriate  - Identify discharge learning needs (meds, wound care, etc )  - Arrange for interpretive services to assist at discharge as needed  - Refer to Case Management Department for coordinating discharge planning if the patient needs post-hospital services based on physician/advanced practitioner order or complex needs related to functional status, cognitive ability, or social support system  Outcome: Progressing

## 2020-07-17 NOTE — PLAN OF CARE
Problem: PHYSICAL THERAPY ADULT  Goal: Performs mobility at highest level of function for planned discharge setting  See evaluation for individualized goals  Description  Treatment/Interventions: Functional transfer training, LE strengthening/ROM, Therapeutic exercise, Endurance training, Patient/family training, Bed mobility, Equipment eval/education, Gait training, Compensatory technique education(PT to see when stair training is appropriate)  Equipment Recommended: Zeny Benito       See flowsheet documentation for full assessment, interventions and recommendations  Outcome: Progressing  Note:   Prognosis: Fair  Problem List: Decreased strength, Decreased range of motion, Decreased endurance, Impaired balance, Decreased mobility, Decreased safety awareness, Pain  Assessment: Patient is a 66year old female who was admitted to the ED with abdominal pain, dark liquid stool, and vomitus coffee ground emesis  Patient diagnosis includes: Melena, tachycardia, S/P insertion of spinal cord stimulator, hypothyroidism, chornic pain, DM, Right knee pain, and elevated troponin  The following comorbitities affect patient's plan of care: anxiety, arthritis, asthma, basal cell carcinoma, chornic narcotic dependence, fibromyalgia, HTN, spinal stensosis  Patient presents with the following personal factors: inaccessible home environment with steps to enter, positive fall history, unable to ambulate household distances, and ambulates with cane at baseline  Patient's current clinical presentation is unstable/unpredictable evidenced by: increased pain that limits patient's mobility, requires verbal input for safety awareness, requires physical assistance for mobility, abnormal lab values, and overall functional decline (minAx1 with RW for amb vs previously independent with cane)    Patient's impairments include:decreased strength (RLE > LLE), decreased ROM (RLE > LLE), decreased endurance, impaired balance, decreased safety awareness, increased pain that limits mobility, and overall functional decline (minAx1 with RW for amb vs previously independent with cane)  Patient impairment also evidenced in Barthel Index score of 40/100 indicating decreased independence  Impairment also evident in 30 second chair stand: 4 (<10 indicates patient is at increased risk for falls)  Will continue to follow patient in order to progress mobilization and maximize patient independence  Recommend rehab pending patient progress and when medically cleared, however, will continue to monitor patient throughout admission  Patient would benefit from ortho consult in order to address patient's pain back  Patient would benefit from OT consult in order to address ADL adaptations  Barriers to Discharge: Inaccessible home environment     PT Discharge Recommendation: [address]          See flowsheet documentation for full assessment

## 2020-07-17 NOTE — DISCHARGE SUMMARY
Discharge- Hilton Viramontes 1945, 66 y o  female MRN: [id number]  Unit/Bed#: S -01 Encounter: [id number]  Primary Care Provider: NANY Ni   Date and time admitted to hospital: 7/15/2020 12:56 PM    * Melena  Assessment & Plan  · Had melanotic stools, Hemoccult positive  Also had emesis morning of admission  · CT abdomen done, unfortunately without contrast due to dye allergy  · BUN elevated  · Was started on Protonix drip  · S/p EGD with colonoscopy  She has duodenal ulcers  · Discussed with GI  Monitor hemoglobin overnight  · B i d  PPI and Carafate 4x daily   · Received additional unit of PRBC 07/16/2020  · Hemoglobin improved  Now in the low 9's  · No further symptoms  Feeling more energetic  Will discharge to follow GI instructions  Right knee pain  Assessment & Plan  Patient undergoing workup for R knee pain as an outpatient  Ulcers are likely 2/2 NSAIDs that patient has taken for knee pain  Daughter Sj Martinez, who is an MD, wonders if knee brace would help  PT/OT recommended rehab  Patient preferred to walk without the knee brace  Patient is declining this  Not interested in [facility]  Has a rolling walker at home  Per daughter, she is being approved for cortisone knee injections as an outpatient  Will  in cessation of NSAIDs  Elevated troponin  Assessment & Plan  Elevated troponin level, the setting of anemia and hypovolemia  EKG on admission was nonischemic  She denies chest pain  S/P insertion of spinal cord stimulator  Assessment & Plan  Placed years ago, without complication  Cannot obtain MRI    Hypothyroidism  Assessment & Plan  Continue home Levothyroxine     Chronic pain disorder  Assessment & Plan  Takes Percocet p r n  Karrie Nissen Provide IV options while patient is NPO      Diabetes mellitus, type 2 [facility])  Assessment & Plan  Lab Results   Component Value Date    HGBA1C 6 0 01/13/2020       Recent Labs     07/16/20  1523 07/16/20  2101 07/17/20  0732 07/17/20  1046   POCGLU 141* 155* 131 215*       Blood Sugar Average: Last 72 hrs:  (P) 165 25 hold metformin  ISS  Schedule Accu-Cheks q 6 while she is NPO; t i d  A c now that she is back on diet  Tachycardiaresolved as of 7/17/2020  Assessment & Plan  Likely hypovolemic  Chest x-ray checked  Tachycardia resolved s/p fluids      Discharging Physician / Practitioner: Agnieszka Rodrigues PA-C  PCP: Rosmery Montesinos  Admission Date:   Admission Orders (From admission, onward)     Ordered        07/15/20 1448  Inpatient Admission  Once                   Discharge Date: 07/17/20    Resolved Problems  Date Reviewed: 7/17/2020          Resolved    Tachycardia 7/17/2020     Resolved by  Agnieszka Rodrigues PA-C          Consultations During Hospital Stay:  · Gastroenterology  · PT/OT    Procedures Performed:   · EGD    Significant Findings / Test Results:   · Duodenal ulcers on EGD    Incidental Findings:   · None     Test Results Pending at Discharge (will require follow up): · None     Outpatient Tests Requested:  · None    Complications:  None    Reason for Admission:  Melena, anemia    Hospital Course: Wes Bonner is a 66 y o  female patient past medical history of DM type 2 and spinal stenosis s/p spinal nerve stimulator who originally presented to the hospital on 7/15/2020 due to melena and 1 episode of emesis  Leading up to admission, the patient has been undergoing evaluation for right knee pain  She has been taking multiple NSAIDs  Other than this, the patient had been in her normal state of health when the night prior to admission she developed coffee-ground emesis x1  In the morning, she noted dark stools and presented to the ED  Upon presentation, her hemoglobin is 7 7  BUN was elevated  There was blood in the rectal vault on examination in the emergency department  Otherwise, patient hemodynamically stable    She was placed on Protonix drip and was monitored with serial H/H  Gastroenterology assessed the patient and performed EGD 7/16/2020 which showed fresh duodenal ulcers  She has been instructed to discontinue NSAIDs  She has been asked to take Carafate 4 times daily and Protonix twice daily  She has been asked to follow with gastroenterology for repeat EGD in several months  PT/OT has assessed the patient and they recommend rehab  Patient would prefer to return home and perform the exercises as they ask  Patient and her daughter, Juan Carlos Gaston, are agreeable to this plan  Patient's hemoglobin has been above her baseline at discharge  She is agreeable to follow-up instructions and all questions are answered  Please see above list of diagnoses and related plan for additional information  Condition at Discharge: good     Discharge Day Visit / Exam:     Subjective:  Patient feels well  Much improved from presentation  Slightly tired  No further melena  No further emesis  Tolerating diet  Would prefer to return home rather than consider rehab  She would consider home physical therapy but does not want to make a decision  She would prefer to return home and make a decision then  She has a rolling walker at home  Vitals: Blood Pressure: 140/82 (07/17/20 0727)  Pulse: 95 (07/17/20 0727)  Temperature: 97 7 °F (36 5 °C) (07/17/20 0727)  Temp Source: Oral (07/17/20 0727)  Respirations: 18 (07/17/20 0727)  Height: 5' (152 4 cm) (07/16/20 1129)  Weight - Scale: 83 7 kg (184 lb 8 4 oz) (07/17/20 0600)  SpO2: 97 % (07/17/20 0727)  Exam:   Physical Exam   Constitutional: She is oriented to person, place, and time  She appears well-developed and well-nourished  No distress  HENT:   Head: Normocephalic and atraumatic  Eyes: Right eye exhibits no discharge  Left eye exhibits no discharge  No scleral icterus  Cardiovascular: Normal rate and regular rhythm  Exam reveals no gallop and no friction rub  No murmur heard    Pulmonary/Chest: Effort normal and breath sounds normal  No respiratory distress  She has no wheezes  She has no rales  Abdominal: Soft  Bowel sounds are normal  She exhibits no distension  There is no tenderness  Musculoskeletal: She exhibits tenderness (R knee tnederness, no deformity or discoloration)  Neurological: She is alert and oriented to person, place, and time  Skin: Skin is warm and dry  Nursing note and vitals reviewed  Discussion with Family: Discussed with patient's daughter, Gina Grimaldo, and all questions answered  Discharge instructions/Information to patient and family:   See after visit summary for information provided to patient and family  Provisions for Follow-Up Care:  See after visit summary for information related to follow-up care and any pertinent home health orders  Disposition:     Home    For Discharges to [facility] SNF:   · Not Applicable to this Patient - Not Applicable to this Patient    Planned Readmission: none     Discharge Statement:  I spent 45 minutes discharging the patient  This time was spent on the day of discharge  I had direct contact with the patient on the day of discharge  Greater than 50% of the total time was spent examining patient, answering all patient questions, arranging and discussing plan of care with patient as well as directly providing post-discharge instructions  Additional time then spent on discharge activities  Discharge Medications:  See after visit summary for reconciled discharge medications provided to patient and family        ** Please Note: This note has been constructed using a voice recognition system **

## 2020-07-17 NOTE — ASSESSMENT & PLAN NOTE
Continue home Levothyroxine
Elevated troponin level, the setting of anemia and hypovolemia  EKG on admission was nonischemic  She denies chest pain 
Elevated troponin level, the setting of anemia and hypovolemia  EKG on admission was nonischemic  She denies chest pain 
Lab Results   Component Value Date    HGBA1C 6 0 01/13/2020       No results for input(s): POCGLU in the last 72 hours  Blood Sugar Average: Last 72 hrs:   hold metformin  ISS  Schedule Accu-Cheks q 6 while she is NPO 
Lab Results   Component Value Date    HGBA1C 6 0 01/13/2020       Recent Labs     07/16/20  0031 07/16/20  0613 07/16/20  0832 07/16/20  1523   POCGLU 200* 162* 145* 141*       Blood Sugar Average: Last 72 hrs:  (P) 164 2 hold metformin  ISS  Schedule Accu-Cheks q 6 while she is NPO; t i d  A c now that she is back on diet 
Lab Results   Component Value Date    HGBA1C 6 0 01/13/2020       Recent Labs     07/16/20  1523 07/16/20  2101 07/17/20  0732 07/17/20  1046   POCGLU 141* 155* 131 215*       Blood Sugar Average: Last 72 hrs:  (P) 165 25 hold metformin  ISS  Schedule Accu-Cheks q 6 while she is NPO; t i d  A c now that she is back on diet 
Likely hypovolemic  Check Chest Xray as patient has SOB last night
Likely hypovolemic  Chest x-ray checked    Tachycardia resolved s/p fluids
Likely hypovolemic  Chest x-ray checked  Tachycardia improved s/p 100cc/hr NS IVF x 12 hours  Will reduce to 75cc/hr for 12 more hours 
Patient undergoing workup for R knee pain as an outpatient  Ulcers are likely 2/2 NSAIDs that patient has taken for knee pain  Daughter Janet, who is an MD, wonders if knee brace would help  PT/OT recommended rehab  Patient preferred to walk without the knee brace  Patient is declining this  Not interested in [facility]  Has a rolling walker at home  Per daughter, she is being approved for cortisone knee injections as an outpatient  Will  in cessation of NSAIDs 
Patient undergoing workup for R knee pain as an outpatient  Ulcers are likely 2/2 NSAIDs that patient has taken for knee pain  Daughter Yoselyn Griffin, who is an MD, wonders if knee brace would help    I will throw in PT/OT
Placed years ago, without complication 
Placed years ago, without complication 
Placed years ago, without complication    Cannot obtain MRI
Takes Dena Snowden Provide IV options while patient is NPO 
Takes Percocet p r n  [place] Provide IV options now that she is NPO 
Takes Percocet p r n  Monserrat Abad Provide IV options while patient is NPO 
· Had melanotic stools, Hemoccult positive  Also had emesis morning of admission  · CT abdomen done, unfortunately without contrast due to dye allergy  · BUN elevated  · Was started on Protonix drip  · S/p EGD with colonoscopy  She has duodenal ulcers  · Discussed with GI  Monitor hemoglobin overnight  · B i d  PPI and Carafate 4x daily   · Received additional unit of PRBC 07/16/2020  · Hemoglobin improved  Now in the low 9's  · No further symptoms  Feeling more energetic  Will discharge to follow GI instructions 
· Melanotic stools, Hemoccult positive  Also had emesis morning of admission  · BUN elevated  · Started on Protonix drip  · Discussed with GI in ED  · Will keep NPO  · Check CT abdomen as she has LLQ tenderness  · Heme 7 7 on presentation  Will trend Q6  Blood consent obtained in ED     · Place on IVF with caution
· Melanotic stools, Hemoccult positive  Also had emesis morning of admission  · CT abdomen done, unfortunately without contrast due to dye allergy  · BUN elevated  · Was started on Protonix drip  · S/p EGD with colonoscopy  She has duodenal ulcers  · Discussed with GI  Monitor hemoglobin overnight  · B i d  PPI and Carafate 4x daily   · Will give additional unit PRBC on 7/16/2020 as patient is weak and hemoglobin is still below baseline 
18:14

## 2020-07-17 NOTE — UTILIZATION REVIEW
Notification of Discharge  This is a Notification of Discharge from our facility [address]  Please be advised that this patient has been discharge from our facility  Below you will find the admission and discharge date and time including the patients disposition  PRESENTATION DATE: 7/15/2020 12:56 PM  OBS ADMISSION DATE:   IP ADMISSION DATE: 7/15/20 1447   DISCHARGE DATE: 7/17/2020  1:49 PM  DISPOSITION: Home/Self Care Home/Self Care   Admission Orders listed below:  Admission Orders (From admission, onward)     Ordered        07/15/20 1448  Inpatient Admission  Once                   Please contact the UR Department if additional information is required to close this patient's authorization/case  [address] [employer] Utilization Review Department  Main: [phone] x carefully listen to the prompts  All voicemails are confidential   [email or web address]  Send all requests for admission clinical reviews, approved or denied determinations and any other requests to dedicated fax number below belonging to the campus where the patient is receiving treatment   List of dedicated fax numbers:  [address] DENIALS (Administrative/Medical Necessity) [phone]   [address] (Maternity/NICU/Pediatrics) [phone]   Conda Sas [phone]   Jamas Piggs [phone]   [address] [place] [address] [phone]   [facility]ell [facility] [address] [facility] [phone]   [facility]  [phone]   [address] [facility] [address]  [address] [facility] [address] [facility] [phone]

## 2020-07-17 NOTE — DISCHARGE INSTR - AVS FIRST PAGE
Dear Milagros Torres,     It was our pleasure to care for you here at [facility]  It is our hope that we were always able to exceed the expected standards for your care during your stay  You were hospitalized due to anemia with concern for upper GI bleeding  You were cared for on the [facility] 4th floor by Anne Momin PA-C under the service of Gucci Wasserman MD with the [facility] who covers for your primary care physician (PCP), Simona Go, while you were hospitalized  If you have any questions or concerns related to this hospitalization, you may contact us at [phone]  For follow up as well as any medication refills, we recommend that you follow up with your primary care physician  A registered nurse will reach out to you by phone within a few days after your discharge to answer any additional questions that you may have after going home  However, at this time we provide for you here, the most important instructions / recommendations at discharge:     · Notable Medication Adjustments -   · Please take a PPI such as Protonix twice daily  · Please take Carafate 4 times daily  · Testing Required after Discharge -   · Gastroenterology recommends a repeat EGD in 2-3 months to check on duodenal ulcers  · Other Instructions -   · Please perform physical therapy printout instructions as indicated  · Please review this entire after visit summary as additional general instructions including medication list, appointments, activity, diet, any pertinent wound care, and other additional recommendations from your care team that may be provided for you        Sincerely,     Anne Momin PA-C

## 2020-07-17 NOTE — PROGRESS NOTES
Progress Note - Og Dominguez 66 y o  female MRN: [id number]    Unit/Bed#: S -01 Encounter: [id number]        Subjective:     Patient seen examined the bedside this morning  She reports that she has no nausea, vomiting or any bowel movement since prior to admission  She tolerated breakfast     ROS: As noted in the HPI, otherwise all others negative  Objective:     Vitals: Blood pressure 140/82, pulse 95, temperature 97 7 °F (36 5 °C), temperature source Oral, resp  rate 18, height 5' (1 524 m), weight 83 7 kg (184 lb 8 4 oz), SpO2 97 %, not currently breastfeeding  ,Body mass index is 36 04 kg/m²  Intake/Output Summary (Last 24 hours) at 7/17/2020 1343  Last data filed at 7/17/2020 0601  Gross per 24 hour   Intake 781 67 ml   Output 1400 ml   Net -618 33 ml       Physical Exam:     General Appearance: Alert and oriented x 3  In no respiratory distress  Lungs: Clear to auscultation bilaterally, no rales or rhonchi  Heart: Regular rate and rhythm, S1, S2 normal, no murmur, click, rub or gallop  Abdomen: Soft, non-tender, non-distended; bowel sounds normal; no masses or no organomegaly  Extremities: No cyanosis, edema    Invasive Devices     Peripheral Intravenous Line            Peripheral IV 06/07/18 Left Forearm 771 days    Peripheral IV 07/14/20 Left Arm 3 days                Lab Results:  Results from last 7 days   Lab Units 07/17/20  0717  07/16/20  0636   WBC Thousand/uL  --   --  8 20   HEMOGLOBIN g/dL 9 2*   < > 7 8*   HEMATOCRIT % 29 4*   < > 26 5*   PLATELETS Thousands/uL  --   --  240   NEUTROS PCT %  --   --  52   LYMPHS PCT %  --   --  34   MONOS PCT %  --   --  11   EOS PCT %  --   --  2    < > = values in this interval not displayed       Results from last 7 days   Lab Units 07/16/20  0636 07/15/20  1327   POTASSIUM mmol/L 4 0 4 6   CHLORIDE mmol/L 109* 104   CO2 mmol/L 21 24   BUN mg/dL 44* 44*   CREATININE mg/dL 0 98 1 14   CALCIUM mg/dL 8 9 9 0   ALK PHOS U/L  --  87   ALT U/L  --  14 AST U/L  --  14               Imaging Studies: I have personally reviewed pertinent imaging studies  Ct Abdomen Pelvis Wo Contrast    Result Date: 7/15/2020  Impression: No acute intra-abdominal pathology within the limits of this unenhanced examination  Uncomplicated colonic diverticulosis  Workstation performed: [id number]     Xr Chest Portable    Result Date: 7/15/2020  Impression: No acute cardiopulmonary disease  Workstation performed: [id number]         Assessment and Plan:     1) Acute blood-loss anemia initially presenting with coffee-ground emesis and melena secondary to peptic and duodenal ulcers - On admission, patient's hemoglobin was 7 7 with elevated BUN  She was also slightly tachycardic  EGD revealing fresh ulcers in the pyloric channel as well as ulcers in the duodenal bowl  There was narrowing of the lumen in ulceration post bulbar  Patient's hemoglobin 9 2 today    - Continue Protonix twice daily for 8-12 weeks, then down to once daily  - Carafate 4 times daily  - Repeat EGD as an outpatient in 8-12 weeks to confirm healing  - Follow up biopsy results as an outpatient  - Non-ulcerogenic diet  - Counseled patient on avoiding NSAIDs

## 2020-07-17 NOTE — PLAN OF CARE
Problem: PHYSICAL THERAPY ADULT  Goal: Performs mobility at highest level of function for planned discharge setting  See evaluation for individualized goals  Description  Treatment/Interventions: Functional transfer training, LE strengthening/ROM, Therapeutic exercise, Endurance training, Patient/family training, Bed mobility, Equipment eval/education, Gait training, Compensatory technique education(PT to see when stair training is appropriate)  Equipment Recommended: Kyra Barboza       See flowsheet documentation for full assessment, interventions and recommendations  7/17/2020 1057 by Rocky Weeks, PT  Outcome: Progressing  Note:   Prognosis: Fair  Problem List: Decreased strength, Decreased range of motion, Decreased endurance, Impaired balance, Decreased mobility, Decreased safety awareness, Pain  Assessment: Patient is a 66year old female who was admitted to the ED with abdominal pain, dark liquid stool, and vomitus coffee ground emesis  Patient diagnosis includes: Melena, tachycardia, S/P insertion of spinal cord stimulator, hypothyroidism, chornic pain, DM, Right knee pain, and elevated troponin  The following comorbitities affect patient's plan of care: anxiety, arthritis, asthma, basal cell carcinoma, chornic narcotic dependence, fibromyalgia, HTN, spinal stensosis  Patient presents with the following personal factors: inaccessible home environment with steps to enter, positive fall history, unable to ambulate household distances, and ambulates with cane at baseline  Patient's current clinical presentation is unstable/unpredictable evidenced by: increased pain that limits patient's mobility, requires verbal input for safety awareness, requires physical assistance for mobility, abnormal lab values, and overall functional decline (minAx1 with RW for amb vs previously independent with cane)    Patient's impairments include:decreased strength (RLE > LLE), decreased ROM (RLE > LLE), decreased endurance, impaired balance, decreased safety awareness, increased pain that limits mobility, and overall functional decline (minAx1 with RW for amb vs previously independent with cane)  Patient impairment also evidenced in Barthel Index score of 40/100 indicating decreased independence  Impairment also evident in 30 second chair stand: 4 (<10 indicates patient is at increased risk for falls)  Will continue to follow patient in order to progress mobilization and maximize patient independence  Recommend rehab pending patient progress and when medically cleared, however, will continue to monitor patient throughout admission  Patient would benefit from ortho consult in order to address patient's pain back  Patient would benefit from OT consult in order to address ADL adaptations  Barriers to Discharge: Inaccessible home environment     PT Discharge Recommendation: [address]          See flowsheet documentation for full assessment  7/17/2020 1057 by Veronica Huerta, NELDA  Outcome: Progressing  Note:   Prognosis: Fair  Problem List: Decreased strength, Decreased range of motion, Decreased endurance, Impaired balance, Decreased mobility, Decreased safety awareness, Pain  Assessment: Patient is a 66year old female who was admitted to the ED with abdominal pain, dark liquid stool, and vomitus coffee ground emesis  Patient diagnosis includes: Melena, tachycardia, S/P insertion of spinal cord stimulator, hypothyroidism, chornic pain, DM, Right knee pain, and elevated troponin  The following comorbitities affect patient's plan of care: anxiety, arthritis, asthma, basal cell carcinoma, chornic narcotic dependence, fibromyalgia, HTN, spinal stensosis  Patient presents with the following personal factors: inaccessible home environment with steps to enter, positive fall history, unable to ambulate household distances, and ambulates with cane at baseline   Patient's current clinical presentation is unstable/unpredictable evidenced by: increased pain that limits patient's mobility, requires verbal input for safety awareness, requires physical assistance for mobility, abnormal lab values, and overall functional decline (minAx1 with RW for amb vs previously independent with cane)  Patient's impairments include:decreased strength (RLE > LLE), decreased ROM (RLE > LLE), decreased endurance, impaired balance, decreased safety awareness, increased pain that limits mobility, and overall functional decline (minAx1 with RW for amb vs previously independent with cane)  Patient impairment also evidenced in Barthel Index score of 40/100 indicating decreased independence  Impairment also evident in 30 second chair stand: 4 (<10 indicates patient is at increased risk for falls)  Will continue to follow patient in order to progress mobilization and maximize patient independence  Recommend rehab pending patient progress and when medically cleared, however, will continue to monitor patient throughout admission  Patient would benefit from ortho consult in order to address patient's pain back  Patient would benefit from OT consult in order to address ADL adaptations  Barriers to Discharge: Inaccessible home environment     PT Discharge Recommendation: [address]          See flowsheet documentation for full assessment

## 2020-07-20 ENCOUNTER — TRANSITIONAL CARE MANAGEMENT (OUTPATIENT)
Dept: FAMILY MEDICINE CLINIC | Facility: CLINIC | Age: 75
End: 2020-07-20

## 2020-07-20 DIAGNOSIS — N39.0 URINARY TRACT INFECTION WITHOUT HEMATURIA, SITE UNSPECIFIED: Primary | ICD-10-CM

## 2020-07-20 RX ORDER — CIPROFLOXACIN 500 MG/1
500 TABLET, FILM COATED ORAL EVERY 12 HOURS SCHEDULED
Qty: 20 TABLET | Refills: 0 | Status: SHIPPED | OUTPATIENT
Start: 2020-07-20 | End: 2020-07-30

## 2020-07-21 ENCOUNTER — PREP FOR PROCEDURE (OUTPATIENT)
Dept: GASTROENTEROLOGY | Facility: CLINIC | Age: 75
End: 2020-07-21

## 2020-07-21 ENCOUNTER — TELEPHONE (OUTPATIENT)
Dept: FAMILY MEDICINE CLINIC | Facility: CLINIC | Age: 75
End: 2020-07-21

## 2020-07-21 ENCOUNTER — TELEMEDICINE (OUTPATIENT)
Dept: FAMILY MEDICINE CLINIC | Facility: CLINIC | Age: 75
End: 2020-07-21
Payer: COMMERCIAL

## 2020-07-21 ENCOUNTER — TELEPHONE (OUTPATIENT)
Dept: GASTROENTEROLOGY | Facility: CLINIC | Age: 75
End: 2020-07-21

## 2020-07-21 DIAGNOSIS — G89.4 CHRONIC PAIN DISORDER: ICD-10-CM

## 2020-07-21 DIAGNOSIS — K25.4 GASTROINTESTINAL HEMORRHAGE ASSOCIATED WITH GASTRIC ULCER: Primary | ICD-10-CM

## 2020-07-21 DIAGNOSIS — K25.9 MULTIPLE GASTRIC ULCERS: Primary | ICD-10-CM

## 2020-07-21 DIAGNOSIS — Z20.822 ENCOUNTER FOR LABORATORY TESTING FOR COVID-19 VIRUS: ICD-10-CM

## 2020-07-21 PROCEDURE — 1160F RVW MEDS BY RX/DR IN RCRD: CPT | Performed by: NURSE PRACTITIONER

## 2020-07-21 PROCEDURE — 1111F DSCHRG MED/CURRENT MED MERGE: CPT | Performed by: NURSE PRACTITIONER

## 2020-07-21 PROCEDURE — 99212 OFFICE O/P EST SF 10 MIN: CPT | Performed by: NURSE PRACTITIONER

## 2020-07-21 RX ORDER — FENTANYL 25 UG/H
1 PATCH TRANSDERMAL
Qty: 10 PATCH | Refills: 0 | Status: SHIPPED | OUTPATIENT
Start: 2020-07-21 | End: 2020-07-24

## 2020-07-21 RX ORDER — FENTANYL 25 UG/H
1 PATCH TRANSDERMAL
Qty: 10 PATCH | Refills: 0 | Status: SHIPPED | OUTPATIENT
Start: 2020-07-21 | End: 2020-07-21 | Stop reason: SDUPTHER

## 2020-07-21 NOTE — TELEPHONE ENCOUNTER
Received call from [facility] stated she wanted to speak to PCP about new medication [id number]  Joseph Messina They do not feel comfortable filling this medication, they feel it should go through pain management

## 2020-07-21 NOTE — PROGRESS NOTES
Virtual Regular Visit/ Transition of Care Visit    Assessment/Plan:    Problem List Items Addressed This Visit        Other    Chronic pain disorder    Relevant Medications    fentaNYL ([address]) 25 mcg/hr      Other Visit Diagnoses     Gastrointestinal hemorrhage associated with gastric ulcer    -  Primary    Relevant Orders    CBC and differential        Reason for visit is   Chief Complaint   Patient presents with    Virtual Regular Visit    Transition of Care Management        Encounter provider Edwin Franklin, [facility]    Provider located at [address] [place] [id number]      Recent Visits  Date Type Provider Dept   07/17/20 Telephone Anjali Skaggs 429 recent visits within past 7 days and meeting all other requirements     Today's Visits  Date Type Provider Dept   07/21/20 Telephone Edwin Franklin, [address]   07/21/20 Anjali Loco 429 today's visits and meeting all other requirements     Future Appointments  Date Type Provider Dept   07/21/20 Telephone Anjali Skaggs [place] [facility] future appointments within next 150 days and meeting all other requirements        The patient was identified by name and date of birth  Desi Fraser was informed that this is a telemedicine visit and that the visit is being conducted through [employer] and patient was informed that this is not a secure, HIPAA-complaint platform  She agrees to proceed     My office door was closed  No one else was in the room  She acknowledged consent and understanding of privacy and security of the video platform  The patient has agreed to participate and understands they can discontinue the visit at any time  Patient is aware this is a billable service       TCM Call (since 6/20/2020)     Date and time call was made  7/20/2020 10:05 AM    [facility] care reviewed  Records reviewed    Patient was hospitialized at  [address] [place] [address]    Date of Admission  07/15/20    Date of discharge  07/17/20    Diagnosis  Melena     Disposition  Home    Were the patients medications reviewed and updated  No    Current Symptoms  Fatigue <img src='C:FILES (X86)    Fatigue severity  Moderate      TCM Call (since 6/20/2020)     Post hospital issues  None    Should patient be enrolled in anticoag monitoring? No    Scheduled for follow up? Yes    Did you obtain your prescribed medications  Yes    Do you need help managing your prescriptions or medications  No    Is transportation to your appointment needed  No    I have advised the patient to call PCP with any new or worsening symptoms  Paloma Silence, RMA    Living Arrangements  Family members    Are you recieving any outpatient services  No    Are you recieving home care services  No    Are you using any community resources  No    Current waiver services  No    Have you fallen in the last 12 months  No    Interperter language line needed  No    Counseling  Patient        Subjective    Minerva Navarro is a 66 y o  female presenting for hospital follow-up for upper gastrointestinal bleed   HMI: Patient was admitted on 07/15/2020 to [facility] for melanotic stools and coffee ground emesis in morning  Patient discharged on 07/17/2020 with the diagnoses of duodenal ulcers (UGI Bleed)  The patient as a long history of spinal stenosis and recently as been treated for right knee pain  Patient is currently awaiting approval for an injection of Synvisc to treat the knee pain  Due to knee pain and continued back pain the patient as been taking increased amounts of NSAIDs  Patient's initial hemoglobin was 7 7 and BUN was elevated as well as her blood glucose on admission  She continued to have melena so Gastroenterology was consulted  Patient underwent an EGD 7/16/2020 which showed fresh duodenal ulcers   GI would like patient to follow up as an outpatient with repeat EGD in several months time  Patient was evalauted by physical therapy for right knee pain  A knee brace was tired for increased support but patient did not tolerate well  Recommendation made for continued rehabilitation but patient preferred to return home with exercise plan and use of roller walker  Patient was given two units of packed red blood cells with hemoglobin improved above patient's baseline to 9 2 upon discharge  Patient was discharged on pantoprazole twice a day and Carafate four times a day  Patient continues to have the right knee pain and chronic back pains  The plan discussed with patient to repeat CBC in two days to check on possible drop again  Discussed with patient and her daughter to obtained prior authorization for Savella to manage fibromyalgia, Synvisc injection for knee pain and due to continued chronic pain and inability to continue on NSAIDs will return to previous use of Fentanyl patch with use of Percocet for breakthrough pain  Patient and her daughter both mentioned that the Fentanyl patch was less sedating previously than the Percocet  Patient reports that she is still having black stools and unable to  Carafate until tomorrow due to availability  She feels slightly improved and using walker and doing knee exercises given to her by hospital's PT department      Past Medical History:   Diagnosis Date    Acid reflux     Anxiety     Arthritis     Asthma     Basal cell carcinoma     upper lip    Chronic narcotic dependence (HCC)     Chronic pain     Colon polyp     Cystocele     Diabetes mellitus ([address] )     Diverticulosis     Dysfunctional uterine bleeding     last assessed - [id number]    Fibromyalgia     Gastric ulcer     Gastroparesis     History of colonic polyps     last assessed - [id number]    History of gastroesophageal reflux (GERD)     Hypercholesterolemia     Hyperlipidemia     Hypertension     IBS (irritable bowel syndrome)     Kidney stone     Post laminectomy syndrome     Seasonal allergies     Spinal stenosis        Past Surgical History:   Procedure Laterality Date    APPENDECTOMY      BACK SURGERY      BREAST CYST ASPIRATION Left     pt unsure what exactly was removed    CHOLECYSTECTOMY      ESOPHAGOGASTRODUODENOSCOPY N/A 9/28/2016    Procedure: ESOPHAGOGASTRODUODENOSCOPY (EGD); Surgeon: Mary Mcleod MD;  [facility]; Service:     HERNIA REPAIR      HYSTERECTOMY      TTAH-BSO age 25   Latosha Riis LAMINECTOMY      LUMBAR LAMINECTOMY      OOPHORECTOMY      age 25   Latosha Riis NH ARTHRODESIS POSTERIOR/POSTEROLATERAL THORACIC N/A 6/4/2018    Procedure: Reopening of lumbar incision for T12-L5 posterior instrumented fixation and fusion and T12-L4 posterior decompression;  Surgeon: Lolly Hackett MD;  [facility];  Service: Neurosurgery    NH COLONOSCOPY FLX DX W/COLLJ SPEC WHEN PFRMD N/A 3/2/2016    Procedure: EGD AND COLONOSCOPY;  Surgeon: Mary Mcleod MD;  [facility]; Service: Gastroenterology    NH DILATE ESOPHAGUS N/A 9/28/2016    Procedure: DILATATION ESOPHAGEAL;  Surgeon: Mary Mcleod MD;  [facility]; Service: Gastroenterology    NH ESOPHAGOGASTRODUODENOSCOPY TRANSORAL DIAGNOSTIC N/A 7/18/2016    Procedure: ESOPHAGOGASTRODUODENOSCOPY (EGD); Surgeon: Mary Mcleod MD;  [facility];   Service: Gastroenterology    NH IMPLANT SPINAL NEUROSTIM/ Left 6/4/2018    Procedure: removal of left buttock implantable pulse generator and placement of new  implantable pulse generator;  Surgeon: Lolly Hackett MD;  [facility];  Service: Neurosurgery       Current Outpatient Medications   Medication Sig Dispense Refill    acetaminophen (TYLENOL) 325 mg tablet Take 2 tablets (650 mg total) by mouth every 6 (six) hours as needed for mild pain, headaches or fever 30 tablet 0    albuterol (PROVENTIL HFA,VENTOLIN HFA) 90 mcg/act inhaler Inhale 2 puffs every 6 (six) hours as needed for wheezing or shortness of breath 1 Inhaler 5    baclofen 10 mg tablet Take 1 tablet (10 mg total) by mouth 3 (three) times a day as needed for muscle spasms 30 tablet 3    buPROPion (WELLBUTRIN XL) 150 mg 24 hr tablet Take 1 tablet (150 mg total) by mouth daily for 30 days 30 tablet 3    ciprofloxacin (CIPRO) 500 mg tablet Take 1 tablet (500 mg total) by mouth every 12 (twelve) hours for 10 days 20 tablet 0    cyclobenzaprine (FLEXERIL) 10 mg tablet Take 1 tablet (10 mg total) by mouth daily at bedtime 30 tablet 3    dicyclomine (BENTYL) 10 mg capsule Take 1 capsule (10 mg total) by mouth 3 (three) times a day as needed (colon spasms) 60 capsule 3    fentaNYL (DURAGESIC) 25 mcg/hr Place 1 patch on the skin every third dayMax Daily Amount: 1 patch 10 patch 0    furosemide (LASIX) 20 mg tablet Take 1 tablet (20 mg total) by mouth daily as needed (edema) 30 tablet 2    gabapentin (NEURONTIN) 300 mg capsule 1 in am and 2 hs 270 capsule 3    glucose blood test strip Bid testing 100 each 6    guaiFENesin 400 mg Take 400 mg by mouth daily as needed for cough      hydrochlorothiazide (HYDRODIURIL) 12 5 mg tablet Take 1 tablet (12 5 mg total) by mouth daily as needed (edema/swelling) 30 tablet 3    hyoscyamine (ANASPAZ) 0 125 mg Take 1 tablet (0 125 mg total) by mouth every 4 (four) hours as needed (spasms) for up to 10 days 40 tablet 1    IRON PO Take 65 mg by mouth 2 (two) times a day        latanoprost (XALATAN) 0 005 % ophthalmic solution Administer 1 drop to both eyes daily at bedtime 2 5 mL 3    levothyroxine 25 mcg tablet Take 1 5 tablets (37 5 mcg total) by mouth daily 45 tablet 3    lisinopril (ZESTRIL) 20 mg tablet Take 1 tablet (20 mg total) by mouth daily 30 tablet 3    loratadine (CLARITIN) 10 mg tablet Take 10 mg by mouth daily as needed for allergies      Lutein 10 MG TABS Take 10 mg by mouth daily        meclizine (ANTIVERT) 25 mg tablet Take 1 tablet (25 mg total) by mouth 2 (two) times a day 60 tablet 3    metFORMIN (GLUCOPHAGE) 1000 MG tablet Take 1 tablet (1,000 mg total) by mouth 2 (two) times a day with meals 180 tablet 3    metoclopramide (REGLAN) 5 mg tablet Take 1 tablet (5 mg total) by mouth 3 (three) times a day 90 tablet 3    Milnacipran HCl (Savella) 100 MG TABS Take 1 tablet (100 mg total) by mouth daily 30 tablet 3    nitrofurantoin (MACRODANTIN) 50 mg capsule Take 1 capsule (50 mg total) by mouth daily at bedtime 30 capsule 3    oxybutynin (DITROPAN) 5 mg tablet Take 1 tablet (5 mg total) by mouth 3 (three) times a day 90 tablet 3    oxyCODONE-acetaminophen (PERCOCET)  mg per tablet Take 1 tablet by mouth every 6 (six) hours as needed for severe painMax Daily Amount: 4 tablets 100 tablet 0    pantoprazole (PROTONIX) 40 mg tablet Take 1 tablet (40 mg total) by mouth 2 (two) times daily after meals 60 tablet 2    potassium chloride (K-DUR,KLOR-CON) 20 mEq tablet Take 1 tablet (20 mEq total) by mouth daily as needed (edema) 30 tablet 2    pravastatin (PRAVACHOL) 10 mg tablet Take 1 tablet (10 mg total) by mouth daily at bedtime 90 tablet 5    promethazine (PHENERGAN) 25 mg tablet Take 1 tablet (25 mg total) by mouth every 6 (six) hours as needed for nausea or vomiting 30 tablet 3    pyridoxine (VITAMIN B6) 100 mg tablet Take 100 mg by mouth daily      sucralfate (CARAFATE) 1 g/10 mL suspension Take 10 mL (1,000 mg total) by mouth every 6 (six) hours 420 mL 0    zolpidem (AMBIEN) 5 mg tablet Take 1 tablet (5 mg total) by mouth daily at bedtime as needed for sleep 30 tablet 3     No current facility-administered medications for this visit           Allergies   Allergen Reactions    Penicillins Anaphylaxis and Other (See Comments)     Other reaction(s): Unknown Reaction    Sulfa Antibiotics Anaphylaxis and Other (See Comments)     Other reaction(s): Unknown Reaction    Aspartame Other (See Comments) and Hypertension     Slurred speech, weakness, stroke sx  Iodinated Diagnostic Agents Hives     Pt has taken prep prior for contrast and has not had any break through reaction    Keflex [Cephalexin]        Review of Systems   Constitutional: Positive for activity change ( using walker to ambulate ) and fatigue  Negative for chills and fever  HENT: Negative  Eyes: Negative for visual disturbance  Respiratory: Positive for shortness of breath  Negative for cough and chest tightness  Cardiovascular: Negative for chest pain and palpitations  Gastrointestinal: Positive for blood in stool  Negative for abdominal pain  Continues with black stool but unsure if bleeding continues or from iron supplements   Endocrine: Negative for polydipsia and polyuria  Genitourinary: Negative  Musculoskeletal: Positive for arthralgias ( right knee pain) and back pain ( chronic pain)  Skin: Negative  Allergic/Immunologic: Negative  Neurological: Negative for dizziness, weakness, light-headedness and headaches  Hematological: Negative  Psychiatric/Behavioral: Negative  Video Exam    There were no vitals filed for this visit  Physical Exam   Constitutional: She is oriented to person, place, and time  She appears well-developed and well-nourished  No distress  HENT:   Head: Normocephalic and atraumatic  Right Ear: External ear normal    Left Ear: External ear normal    Eyes: Pupils are equal, round, and reactive to light  Conjunctivae and EOM are normal    Cardiovascular: Normal rate and regular rhythm  Pulmonary/Chest: Effort normal    Abdominal: Soft  She exhibits no distension  There is no tenderness  Musculoskeletal: She exhibits tenderness ( right knee upon ambulating short distances and chronic back pain continues)  Neurological: She is alert and oriented to person, place, and time  Skin: Skin is dry  No pallor  Psychiatric: She has a normal mood and affect   Her behavior is normal         I spent 25 minutes directly with the patient during this visit      VIRTUAL VISIT Cornelius Farias 24 acknowledges that she has consented to an online visit or consultation  She understands that the online visit is based solely on information provided by her, and that, in the absence of a face-to-face physical evaluation by the physician, the diagnosis she receives is both limited and provisional in terms of accuracy and completeness  This is not intended to replace a full medical face-to-face evaluation by the physician  Jef Vernon understands and accepts these terms  Rationale for use of Synvisc injection for right knee pain:  Patient unable to perform daily activities of daily living due to pain  Patient as tried over the counter pain medications along with prescription pain medication without relief of pain  Patient recent diagnosed with upper GI bleed due to excessive NSAID usage  Patient had right knee x-ray, CT of lower extremity without contrast (allergy to dye), unable to obtain MRI due to spinal cord stimulator and received cortisone injections into the knee without relief of symptoms  Patient as been through multiple physical therapy sessions and currently during physical therapy exercises in her home  Patient was tried with a knee brace to add support but failed due to aggravating chronic back pain  Choosing to perform one Synvisc injection verus multiple due to increased stress to patient and inconvenience for transportation to appointments      Rationale for prescribing Fentanyl patch for chronic pain management:  Patient is S/P post  revision lumbar fusion with extension from T12-L5 with decompression T12-L4 and S/P replacement of spinal cord stimulator IPG  Patient is diagnosed with failed back surgical syndrome  Patient as extensive lumbar stenosis with neurogenic claudication  Due to continued right knee pain, patient as had increased back pain  Patient recently had upper GI bleed after excessive NSAID use   Patient previously on Fentanyl which was less sedating than treatment with other narcotic preparations  Goal to increase patient's mobility without over sedation placing her at increase risk for falling

## 2020-07-21 NOTE — TELEPHONE ENCOUNTER
----- Message from Radario sent at 7/17/2020  2:37 PM EDT -----      ----- Message -----  From: Ean Caass PA-C  Sent: 7/17/2020   1:46 PM EDT  To: Natasha Jacobson    Please schedule EGD for gastric and duodenal ulcers found with Dr Cassie Cavanaugh in 8-12 weeks  Thank you

## 2020-07-22 ENCOUNTER — APPOINTMENT (OUTPATIENT)
Dept: LAB | Facility: AMBULARY SURGERY CENTER | Age: 75
End: 2020-07-22
Payer: COMMERCIAL

## 2020-07-22 ENCOUNTER — TELEPHONE (OUTPATIENT)
Dept: GASTROENTEROLOGY | Facility: MEDICAL CENTER | Age: 75
End: 2020-07-22

## 2020-07-22 DIAGNOSIS — K25.4 GASTROINTESTINAL HEMORRHAGE ASSOCIATED WITH GASTRIC ULCER: ICD-10-CM

## 2020-07-22 DIAGNOSIS — M79.7 FIBROMYALGIA: ICD-10-CM

## 2020-07-22 LAB
BASOPHILS # BLD AUTO: 0.05 THOUSANDS/ΜL (ref 0–0.1)
BASOPHILS NFR BLD AUTO: 1 % (ref 0–1)
EOSINOPHIL # BLD AUTO: 0.35 THOUSAND/ΜL (ref 0–0.61)
EOSINOPHIL NFR BLD AUTO: 6 % (ref 0–6)
ERYTHROCYTE [DISTWIDTH] IN BLOOD BY AUTOMATED COUNT: 15.3 % (ref 11.6–15.1)
HCT VFR BLD AUTO: 26.7 % (ref 34.8–46.1)
HGB BLD-MCNC: 8.1 G/DL (ref 11.5–15.4)
IMM GRANULOCYTES # BLD AUTO: 0.02 THOUSAND/UL (ref 0–0.2)
IMM GRANULOCYTES NFR BLD AUTO: 0 % (ref 0–2)
LYMPHOCYTES # BLD AUTO: 1.68 THOUSANDS/ΜL (ref 0.6–4.47)
LYMPHOCYTES NFR BLD AUTO: 29 % (ref 14–44)
MCH RBC QN AUTO: 26.6 PG (ref 26.8–34.3)
MCHC RBC AUTO-ENTMCNC: 30.3 G/DL (ref 31.4–37.4)
MCV RBC AUTO: 88 FL (ref 82–98)
MONOCYTES # BLD AUTO: 0.56 THOUSAND/ΜL (ref 0.17–1.22)
MONOCYTES NFR BLD AUTO: 10 % (ref 4–12)
NEUTROPHILS # BLD AUTO: 3.23 THOUSANDS/ΜL (ref 1.85–7.62)
NEUTS SEG NFR BLD AUTO: 54 % (ref 43–75)
NRBC BLD AUTO-RTO: 0 /100 WBCS
PLATELET # BLD AUTO: 289 THOUSANDS/UL (ref 149–390)
PMV BLD AUTO: 10.7 FL (ref 8.9–12.7)
RBC # BLD AUTO: 3.05 MILLION/UL (ref 3.81–5.12)
WBC # BLD AUTO: 5.89 THOUSAND/UL (ref 4.31–10.16)

## 2020-07-22 PROCEDURE — 85025 COMPLETE CBC W/AUTO DIFF WBC: CPT

## 2020-07-22 PROCEDURE — 36415 COLL VENOUS BLD VENIPUNCTURE: CPT

## 2020-07-22 NOTE — TELEPHONE ENCOUNTER
----- Message from Surjit Zaman MD sent at 7/20/2020  5:03 PM EDT -----  Gastric biopsies are benign and negative for H pylori  Patient to take Protonix twice daily along with Carafate 4 times a day      Repeat EGD in 3 months to check for ulcer healing

## 2020-07-23 DIAGNOSIS — K25.4 GASTROINTESTINAL HEMORRHAGE ASSOCIATED WITH GASTRIC ULCER: Primary | ICD-10-CM

## 2020-07-23 NOTE — TELEPHONE ENCOUNTER
I spoke to Terrence Elam at the [employer] and gave rationale that patient is not controlled with as needed pain medication and unable to use NSAIDs due to recent GI bleed  Pharmacy does not want to fill script because it should come from a pain management specialist   Patient as had extensive back surgery including spinal cord stimulator and as been discharged from pain management due to lack of their ability to further manage pain with injects et Raina Zuluaga states that she spoke with her supervisor Asuncion Reeves) and they both do not feel comfortable dispensing the medication at this time  Spoke to patient's daughter and requested to send the prescription to another pharmacy to be filled  Currently waiting for prior authorization for patient to obtain the ordered medication

## 2020-07-24 DIAGNOSIS — G89.4 CHRONIC PAIN DISORDER: ICD-10-CM

## 2020-07-24 RX ORDER — FENTANYL 25 UG/H
1 PATCH TRANSDERMAL
Qty: 10 PATCH | Refills: 0 | Status: SHIPPED | OUTPATIENT
Start: 2020-07-24 | End: 2020-09-01 | Stop reason: DRUGHIGH

## 2020-07-29 ENCOUNTER — APPOINTMENT (OUTPATIENT)
Dept: LAB | Facility: CLINIC | Age: 75
End: 2020-07-29
Payer: COMMERCIAL

## 2020-07-29 DIAGNOSIS — K25.4 GASTROINTESTINAL HEMORRHAGE ASSOCIATED WITH GASTRIC ULCER: Primary | ICD-10-CM

## 2020-07-29 DIAGNOSIS — K25.4 GASTROINTESTINAL HEMORRHAGE ASSOCIATED WITH GASTRIC ULCER: ICD-10-CM

## 2020-07-29 LAB
HCT VFR BLD AUTO: 30.5 % (ref 34.8–46.1)
HGB BLD-MCNC: 9 G/DL (ref 11.5–15.4)

## 2020-07-29 PROCEDURE — 85014 HEMATOCRIT: CPT

## 2020-07-29 PROCEDURE — 36415 COLL VENOUS BLD VENIPUNCTURE: CPT

## 2020-07-29 PROCEDURE — 85018 HEMOGLOBIN: CPT

## 2020-08-07 DIAGNOSIS — K92.2 UGI BLEED: ICD-10-CM

## 2020-08-07 DIAGNOSIS — M96.1 POST LAMINECTOMY SYNDROME: ICD-10-CM

## 2020-08-07 DIAGNOSIS — M54.50 LUMBAR BACK PAIN: ICD-10-CM

## 2020-08-07 RX ORDER — OXYCODONE AND ACETAMINOPHEN 10; 325 MG/1; MG/1
1 TABLET ORAL EVERY 6 HOURS PRN
Qty: 100 TABLET | Refills: 0 | Status: SHIPPED | OUTPATIENT
Start: 2020-08-07 | End: 2020-09-02 | Stop reason: SDUPTHER

## 2020-08-07 RX ORDER — SUCRALFATE ORAL 1 G/10ML
1000 SUSPENSION ORAL EVERY 6 HOURS SCHEDULED
Qty: 420 ML | Refills: 0 | Status: SHIPPED | OUTPATIENT
Start: 2020-08-07 | End: 2020-09-21 | Stop reason: ALTCHOICE

## 2020-08-11 DIAGNOSIS — M25.561 ACUTE PAIN OF RIGHT KNEE: Primary | ICD-10-CM

## 2020-08-27 ENCOUNTER — TELEPHONE (OUTPATIENT)
Dept: FAMILY MEDICINE CLINIC | Facility: CLINIC | Age: 75
End: 2020-08-27

## 2020-08-31 ENCOUNTER — TELEPHONE (OUTPATIENT)
Dept: FAMILY MEDICINE CLINIC | Facility: CLINIC | Age: 75
End: 2020-08-31

## 2020-09-01 DIAGNOSIS — G89.4 CHRONIC PAIN DISORDER: Primary | ICD-10-CM

## 2020-09-01 RX ORDER — FENTANYL 50 UG/H
1 PATCH TRANSDERMAL
Qty: 5 PATCH | Refills: 0 | Status: SHIPPED | OUTPATIENT
Start: 2020-09-01 | End: 2020-09-03

## 2020-09-02 DIAGNOSIS — M96.1 POST LAMINECTOMY SYNDROME: ICD-10-CM

## 2020-09-02 DIAGNOSIS — M54.50 LUMBAR BACK PAIN: ICD-10-CM

## 2020-09-02 RX ORDER — OXYCODONE AND ACETAMINOPHEN 10; 325 MG/1; MG/1
1 TABLET ORAL EVERY 6 HOURS PRN
Qty: 100 TABLET | Refills: 0 | Status: SHIPPED | OUTPATIENT
Start: 2020-09-02 | End: 2020-10-06 | Stop reason: SDUPTHER

## 2020-09-03 ENCOUNTER — TELEPHONE (OUTPATIENT)
Dept: FAMILY MEDICINE CLINIC | Facility: CLINIC | Age: 75
End: 2020-09-03

## 2020-09-03 DIAGNOSIS — G89.4 CHRONIC PAIN DISORDER: ICD-10-CM

## 2020-09-03 DIAGNOSIS — K58.2 IRRITABLE BOWEL SYNDROME WITH BOTH CONSTIPATION AND DIARRHEA: ICD-10-CM

## 2020-09-03 RX ORDER — FENTANYL 50 UG/H
1 PATCH TRANSDERMAL
Qty: 5 PATCH | Refills: 0 | Status: SHIPPED | OUTPATIENT
Start: 2020-09-03 | End: 2020-11-10 | Stop reason: SDUPTHER

## 2020-09-03 RX ORDER — DICYCLOMINE HYDROCHLORIDE 10 MG/1
10 CAPSULE ORAL 3 TIMES DAILY PRN
Qty: 60 CAPSULE | Refills: 3 | Status: SHIPPED | OUTPATIENT
Start: 2020-09-03 | End: 2020-09-21 | Stop reason: ALTCHOICE

## 2020-09-03 NOTE — TELEPHONE ENCOUNTER
Call from [facility], they are not comfortable filling Fentanyl patch and are requesting you send the RX to [facility]

## 2020-09-05 DIAGNOSIS — J01.90 ACUTE SINUSITIS, RECURRENCE NOT SPECIFIED, UNSPECIFIED LOCATION: Primary | ICD-10-CM

## 2020-09-05 RX ORDER — DOXYCYCLINE HYCLATE 100 MG/1
100 CAPSULE ORAL EVERY 12 HOURS SCHEDULED
Qty: 20 CAPSULE | Refills: 0 | Status: SHIPPED | OUTPATIENT
Start: 2020-09-05 | End: 2020-09-15

## 2020-09-09 ENCOUNTER — TELEPHONE (OUTPATIENT)
Dept: FAMILY MEDICINE CLINIC | Facility: CLINIC | Age: 75
End: 2020-09-09

## 2020-09-11 DIAGNOSIS — J01.90 ACUTE NON-RECURRENT SINUSITIS, UNSPECIFIED LOCATION: Primary | ICD-10-CM

## 2020-09-11 RX ORDER — LEVOFLOXACIN 500 MG/1
500 TABLET, FILM COATED ORAL EVERY 24 HOURS
Qty: 10 TABLET | Refills: 0 | Status: SHIPPED | OUTPATIENT
Start: 2020-09-11 | End: 2020-09-21 | Stop reason: ALTCHOICE

## 2020-09-21 ENCOUNTER — OFFICE VISIT (OUTPATIENT)
Dept: FAMILY MEDICINE CLINIC | Facility: CLINIC | Age: 75
End: 2020-09-21
Payer: COMMERCIAL

## 2020-09-21 VITALS
TEMPERATURE: 98.5 F | HEIGHT: 60 IN | SYSTOLIC BLOOD PRESSURE: 122 MMHG | HEART RATE: 74 BPM | BODY MASS INDEX: 36.12 KG/M2 | DIASTOLIC BLOOD PRESSURE: 78 MMHG | WEIGHT: 184 LBS

## 2020-09-21 DIAGNOSIS — G89.29 CHRONIC PAIN OF RIGHT KNEE: Primary | ICD-10-CM

## 2020-09-21 DIAGNOSIS — M25.561 CHRONIC PAIN OF RIGHT KNEE: Primary | ICD-10-CM

## 2020-09-21 PROCEDURE — RECHECK: Performed by: NURSE PRACTITIONER

## 2020-09-21 PROCEDURE — 20610 DRAIN/INJ JOINT/BURSA W/O US: CPT | Performed by: NURSE PRACTITIONER

## 2020-09-21 NOTE — PROGRESS NOTES
Large joint arthrocentesis: R knee  Date/Time: 9/21/2020 4:44 PM  Consent given by: patient  Supporting Documentation  Indications: pain   Procedure Details  Location: knee - R knee  Preparation: Patient was prepped and draped in the usual sterile fashion  Needle size: 20 G  Ultrasound guidance: no  Approach: anterolateral  Medications administered: 3 mL sodium hyaluronate 60 MG/3ML    Patient tolerance: patient tolerated the procedure well with no immediate complications          BMI Counseling: Body mass index is 35 94 kg/m²  The BMI is above normal  Nutrition recommendations include reducing portion sizes, decreasing overall calorie intake, 3-5 servings of fruits/vegetables daily, consuming healthier snacks, moderation in carbohydrate intake, increasing intake of lean protein, reducing intake of saturated fat and trans fat and reducing intake of cholesterol  Exercise recommendations include exercising 3-5 times per week and strength training exercises

## 2020-10-02 DIAGNOSIS — H40.9 GLAUCOMA OF BOTH EYES, UNSPECIFIED GLAUCOMA TYPE: ICD-10-CM

## 2020-10-02 DIAGNOSIS — H66.90 ACUTE OTITIS MEDIA, UNSPECIFIED OTITIS MEDIA TYPE: Primary | ICD-10-CM

## 2020-10-02 RX ORDER — LATANOPROST 50 UG/ML
1 SOLUTION/ DROPS OPHTHALMIC
Qty: 2.5 ML | Refills: 3 | Status: SHIPPED | OUTPATIENT
Start: 2020-10-02 | End: 2022-06-29

## 2020-10-06 DIAGNOSIS — M54.50 LUMBAR BACK PAIN: ICD-10-CM

## 2020-10-06 DIAGNOSIS — M96.1 POST LAMINECTOMY SYNDROME: ICD-10-CM

## 2020-10-06 DIAGNOSIS — Z23 ENCOUNTER FOR IMMUNIZATION: Primary | ICD-10-CM

## 2020-10-06 RX ORDER — OXYCODONE AND ACETAMINOPHEN 10; 325 MG/1; MG/1
1 TABLET ORAL EVERY 6 HOURS PRN
Qty: 100 TABLET | Refills: 0 | Status: SHIPPED | OUTPATIENT
Start: 2020-10-06 | End: 2020-11-05 | Stop reason: SDUPTHER

## 2020-10-07 ENCOUNTER — IMMUNIZATIONS (OUTPATIENT)
Dept: FAMILY MEDICINE CLINIC | Facility: CLINIC | Age: 75
End: 2020-10-07
Payer: COMMERCIAL

## 2020-10-07 DIAGNOSIS — Z23 NEED FOR VACCINATION: Primary | ICD-10-CM

## 2020-10-07 PROCEDURE — G0008 ADMIN INFLUENZA VIRUS VAC: HCPCS | Performed by: FAMILY MEDICINE

## 2020-10-07 PROCEDURE — 90662 IIV NO PRSV INCREASED AG IM: CPT | Performed by: FAMILY MEDICINE

## 2020-10-08 DIAGNOSIS — Z12.31 ENCOUNTER FOR SCREENING MAMMOGRAM FOR MALIGNANT NEOPLASM OF BREAST: Primary | ICD-10-CM

## 2020-10-12 ENCOUNTER — TELEPHONE (OUTPATIENT)
Dept: FAMILY MEDICINE CLINIC | Facility: CLINIC | Age: 75
End: 2020-10-12

## 2020-10-12 DIAGNOSIS — M48.061 SPINAL STENOSIS OF LUMBAR REGION, UNSPECIFIED WHETHER NEUROGENIC CLAUDICATION PRESENT: ICD-10-CM

## 2020-10-12 RX ORDER — NITROFURANTOIN MACROCRYSTALS 50 MG/1
50 CAPSULE ORAL
Qty: 30 CAPSULE | Refills: 3 | Status: SHIPPED | OUTPATIENT
Start: 2020-10-12 | End: 2021-04-23 | Stop reason: SDUPTHER

## 2020-10-16 DIAGNOSIS — M54.50 LUMBAR BACK PAIN: ICD-10-CM

## 2020-10-16 RX ORDER — MELOXICAM 15 MG/1
TABLET ORAL
Qty: 30 TABLET | Refills: 0 | OUTPATIENT
Start: 2020-10-16

## 2020-10-19 DIAGNOSIS — M54.50 LUMBAR BACK PAIN: ICD-10-CM

## 2020-10-19 RX ORDER — MELOXICAM 15 MG/1
TABLET ORAL
Qty: 30 TABLET | Refills: 0 | OUTPATIENT
Start: 2020-10-19

## 2020-10-22 DIAGNOSIS — R42 VERTIGO: ICD-10-CM

## 2020-10-22 RX ORDER — MECLIZINE HYDROCHLORIDE 25 MG/1
25 TABLET ORAL 4 TIMES DAILY PRN
Qty: 60 TABLET | Refills: 3 | Status: SHIPPED | OUTPATIENT
Start: 2020-10-22 | End: 2022-03-23 | Stop reason: SDUPTHER

## 2020-10-26 ENCOUNTER — TELEPHONE (OUTPATIENT)
Dept: GASTROENTEROLOGY | Facility: AMBULARY SURGERY CENTER | Age: 75
End: 2020-10-26

## 2020-11-05 DIAGNOSIS — M54.50 LUMBAR BACK PAIN: ICD-10-CM

## 2020-11-05 DIAGNOSIS — M96.1 POST LAMINECTOMY SYNDROME: ICD-10-CM

## 2020-11-05 RX ORDER — OXYCODONE AND ACETAMINOPHEN 10; 325 MG/1; MG/1
1 TABLET ORAL EVERY 6 HOURS PRN
Qty: 100 TABLET | Refills: 0 | Status: SHIPPED | OUTPATIENT
Start: 2020-11-05 | End: 2020-12-04 | Stop reason: SDUPTHER

## 2020-11-09 ENCOUNTER — TELEPHONE (OUTPATIENT)
Dept: GASTROENTEROLOGY | Facility: AMBULARY SURGERY CENTER | Age: 75
End: 2020-11-09

## 2020-11-10 DIAGNOSIS — G89.4 CHRONIC PAIN DISORDER: ICD-10-CM

## 2020-11-10 RX ORDER — FENTANYL 50 UG/H
1 PATCH TRANSDERMAL
Qty: 5 PATCH | Refills: 0 | Status: SHIPPED | OUTPATIENT
Start: 2020-11-10 | End: 2020-12-30 | Stop reason: SDUPTHER

## 2020-11-24 ENCOUNTER — LAB (OUTPATIENT)
Dept: LAB | Facility: CLINIC | Age: 75
End: 2020-11-24
Payer: COMMERCIAL

## 2020-11-24 ENCOUNTER — TRANSCRIBE ORDERS (OUTPATIENT)
Dept: LAB | Facility: CLINIC | Age: 75
End: 2020-11-24

## 2020-11-24 DIAGNOSIS — K25.4 GASTROINTESTINAL HEMORRHAGE ASSOCIATED WITH GASTRIC ULCER: ICD-10-CM

## 2020-11-24 DIAGNOSIS — E03.9 HYPOTHYROIDISM, UNSPECIFIED TYPE: Primary | ICD-10-CM

## 2020-11-24 DIAGNOSIS — E03.9 HYPOTHYROIDISM, UNSPECIFIED TYPE: ICD-10-CM

## 2020-11-24 LAB
HGB BLD-MCNC: 10.2 G/DL (ref 11.5–15.4)
TSH SERPL DL<=0.05 MIU/L-ACNC: 1.61 UIU/ML (ref 0.36–3.74)

## 2020-11-24 PROCEDURE — 36415 COLL VENOUS BLD VENIPUNCTURE: CPT

## 2020-11-24 PROCEDURE — 84443 ASSAY THYROID STIM HORMONE: CPT

## 2020-11-24 PROCEDURE — 85018 HEMOGLOBIN: CPT

## 2020-11-27 DIAGNOSIS — M54.50 LUMBAR BACK PAIN: ICD-10-CM

## 2020-11-27 RX ORDER — BACLOFEN 10 MG/1
10 TABLET ORAL 3 TIMES DAILY PRN
Qty: 30 TABLET | Refills: 3 | Status: SHIPPED | OUTPATIENT
Start: 2020-11-27 | End: 2021-03-31 | Stop reason: SDUPTHER

## 2020-11-30 DIAGNOSIS — J01.10 ACUTE NON-RECURRENT FRONTAL SINUSITIS: Primary | ICD-10-CM

## 2020-11-30 RX ORDER — DOXYCYCLINE HYCLATE 100 MG/1
100 CAPSULE ORAL EVERY 12 HOURS SCHEDULED
Qty: 14 CAPSULE | Refills: 0 | Status: SHIPPED | OUTPATIENT
Start: 2020-11-30 | End: 2020-12-07

## 2020-12-04 DIAGNOSIS — M96.1 POST LAMINECTOMY SYNDROME: ICD-10-CM

## 2020-12-04 DIAGNOSIS — M54.50 LUMBAR BACK PAIN: ICD-10-CM

## 2020-12-04 RX ORDER — OXYCODONE AND ACETAMINOPHEN 10; 325 MG/1; MG/1
1 TABLET ORAL EVERY 6 HOURS PRN
Qty: 100 TABLET | Refills: 0 | Status: SHIPPED | OUTPATIENT
Start: 2020-12-04 | End: 2020-12-30 | Stop reason: SDUPTHER

## 2020-12-11 DIAGNOSIS — N39.0 URINARY TRACT INFECTION WITHOUT HEMATURIA, SITE UNSPECIFIED: Primary | ICD-10-CM

## 2020-12-11 RX ORDER — CIPROFLOXACIN 250 MG/1
250 TABLET, FILM COATED ORAL EVERY 12 HOURS SCHEDULED
Qty: 14 TABLET | Refills: 0 | Status: SHIPPED | OUTPATIENT
Start: 2020-12-11 | End: 2020-12-18

## 2020-12-21 DIAGNOSIS — E13.9 DIABETES 1.5, MANAGED AS TYPE 2 (HCC): ICD-10-CM

## 2020-12-22 DIAGNOSIS — N39.0 URINARY TRACT INFECTION WITHOUT HEMATURIA, SITE UNSPECIFIED: Primary | ICD-10-CM

## 2020-12-22 RX ORDER — LEVOFLOXACIN 500 MG/1
500 TABLET, FILM COATED ORAL EVERY 24 HOURS
Qty: 10 TABLET | Refills: 0 | Status: SHIPPED | OUTPATIENT
Start: 2020-12-22 | End: 2021-01-01

## 2020-12-30 DIAGNOSIS — E03.9 HYPOTHYROIDISM, UNSPECIFIED TYPE: ICD-10-CM

## 2020-12-30 DIAGNOSIS — M79.7 FIBROMYALGIA: ICD-10-CM

## 2020-12-30 DIAGNOSIS — M96.1 POST LAMINECTOMY SYNDROME: ICD-10-CM

## 2020-12-30 DIAGNOSIS — G89.4 CHRONIC PAIN DISORDER: ICD-10-CM

## 2020-12-30 DIAGNOSIS — M54.50 LUMBAR BACK PAIN: ICD-10-CM

## 2020-12-30 DIAGNOSIS — N32.81 OVERACTIVE BLADDER: ICD-10-CM

## 2020-12-30 RX ORDER — LEVOTHYROXINE SODIUM 0.03 MG/1
37.5 TABLET ORAL DAILY
Qty: 45 TABLET | Refills: 3 | Status: SHIPPED | OUTPATIENT
Start: 2020-12-30 | End: 2021-08-09 | Stop reason: SDUPTHER

## 2020-12-30 RX ORDER — MILNACIPRAN HYDROCHLORIDE 100 MG/1
1 TABLET, FILM COATED ORAL DAILY
Qty: 30 TABLET | Refills: 3 | Status: SHIPPED | OUTPATIENT
Start: 2020-12-30 | End: 2021-06-04 | Stop reason: SDUPTHER

## 2020-12-30 RX ORDER — OXYBUTYNIN CHLORIDE 5 MG/1
5 TABLET ORAL 3 TIMES DAILY
Qty: 90 TABLET | Refills: 3 | Status: SHIPPED | OUTPATIENT
Start: 2020-12-30 | End: 2021-06-28 | Stop reason: SDUPTHER

## 2020-12-30 RX ORDER — OXYCODONE AND ACETAMINOPHEN 10; 325 MG/1; MG/1
1 TABLET ORAL EVERY 6 HOURS PRN
Qty: 100 TABLET | Refills: 0 | Status: SHIPPED | OUTPATIENT
Start: 2020-12-30 | End: 2021-01-28 | Stop reason: SDUPTHER

## 2020-12-30 RX ORDER — FENTANYL 50 UG/H
1 PATCH TRANSDERMAL
Qty: 5 PATCH | Refills: 0 | Status: SHIPPED | OUTPATIENT
Start: 2020-12-30 | End: 2021-01-28 | Stop reason: SDUPTHER

## 2021-01-06 DIAGNOSIS — Z20.822 SUSPECTED COVID-19 VIRUS INFECTION: Primary | ICD-10-CM

## 2021-01-06 DIAGNOSIS — Z20.822 SUSPECTED COVID-19 VIRUS INFECTION: ICD-10-CM

## 2021-01-06 DIAGNOSIS — B34.9 VIRAL INFECTION, UNSPECIFIED: Primary | ICD-10-CM

## 2021-01-06 PROCEDURE — U0005 INFEC AGEN DETEC AMPLI PROBE: HCPCS | Performed by: FAMILY MEDICINE

## 2021-01-06 PROCEDURE — U0003 INFECTIOUS AGENT DETECTION BY NUCLEIC ACID (DNA OR RNA); SEVERE ACUTE RESPIRATORY SYNDROME CORONAVIRUS 2 (SARS-COV-2) (CORONAVIRUS DISEASE [COVID-19]), AMPLIFIED PROBE TECHNIQUE, MAKING USE OF HIGH THROUGHPUT TECHNOLOGIES AS DESCRIBED BY CMS-2020-01-R: HCPCS | Performed by: FAMILY MEDICINE

## 2021-01-07 LAB — SARS-COV-2 RNA SPEC QL NAA+PROBE: NOT DETECTED

## 2021-01-11 ENCOUNTER — TELEPHONE (OUTPATIENT)
Dept: FAMILY MEDICINE CLINIC | Facility: CLINIC | Age: 76
End: 2021-01-11

## 2021-01-11 DIAGNOSIS — E11.9 TYPE 2 DIABETES MELLITUS WITHOUT COMPLICATION, WITHOUT LONG-TERM CURRENT USE OF INSULIN (HCC): ICD-10-CM

## 2021-01-11 DIAGNOSIS — E03.9 HYPOTHYROIDISM, UNSPECIFIED TYPE: Primary | ICD-10-CM

## 2021-01-11 DIAGNOSIS — D50.8 IRON DEFICIENCY ANEMIA SECONDARY TO INADEQUATE DIETARY IRON INTAKE: ICD-10-CM

## 2021-01-12 ENCOUNTER — APPOINTMENT (OUTPATIENT)
Dept: LAB | Facility: CLINIC | Age: 76
End: 2021-01-12
Payer: COMMERCIAL

## 2021-01-12 DIAGNOSIS — D50.8 IRON DEFICIENCY ANEMIA SECONDARY TO INADEQUATE DIETARY IRON INTAKE: ICD-10-CM

## 2021-01-12 DIAGNOSIS — E03.9 HYPOTHYROIDISM, UNSPECIFIED TYPE: ICD-10-CM

## 2021-01-12 DIAGNOSIS — E11.9 TYPE 2 DIABETES MELLITUS WITHOUT COMPLICATION, WITHOUT LONG-TERM CURRENT USE OF INSULIN (HCC): ICD-10-CM

## 2021-01-12 LAB
ALBUMIN SERPL BCP-MCNC: 3.4 G/DL (ref 3.5–5)
ALP SERPL-CCNC: 112 U/L (ref 46–116)
ALT SERPL W P-5'-P-CCNC: 20 U/L (ref 12–78)
ANION GAP SERPL CALCULATED.3IONS-SCNC: 9 MMOL/L (ref 4–13)
AST SERPL W P-5'-P-CCNC: 23 U/L (ref 5–45)
BASOPHILS # BLD AUTO: 0.04 THOUSANDS/ΜL (ref 0–0.1)
BASOPHILS NFR BLD AUTO: 1 % (ref 0–1)
BILIRUB SERPL-MCNC: 0.23 MG/DL (ref 0.2–1)
BUN SERPL-MCNC: 11 MG/DL (ref 5–25)
CALCIUM ALBUM COR SERPL-MCNC: 9.6 MG/DL (ref 8.3–10.1)
CALCIUM SERPL-MCNC: 9.1 MG/DL (ref 8.3–10.1)
CHLORIDE SERPL-SCNC: 102 MMOL/L (ref 100–108)
CO2 SERPL-SCNC: 30 MMOL/L (ref 21–32)
CREAT SERPL-MCNC: 0.99 MG/DL (ref 0.6–1.3)
EOSINOPHIL # BLD AUTO: 0.18 THOUSAND/ΜL (ref 0–0.61)
EOSINOPHIL NFR BLD AUTO: 3 % (ref 0–6)
ERYTHROCYTE [DISTWIDTH] IN BLOOD BY AUTOMATED COUNT: 15.6 % (ref 11.6–15.1)
EST. AVERAGE GLUCOSE BLD GHB EST-MCNC: 151 MG/DL
GFR SERPL CREATININE-BSD FRML MDRD: 55 ML/MIN/1.73SQ M
GLUCOSE SERPL-MCNC: 118 MG/DL (ref 65–140)
HBA1C MFR BLD: 6.9 %
HCT VFR BLD AUTO: 37 % (ref 34.8–46.1)
HGB BLD-MCNC: 11.1 G/DL (ref 11.5–15.4)
IMM GRANULOCYTES # BLD AUTO: 0.02 THOUSAND/UL (ref 0–0.2)
IMM GRANULOCYTES NFR BLD AUTO: 0 % (ref 0–2)
LYMPHOCYTES # BLD AUTO: 1.43 THOUSANDS/ΜL (ref 0.6–4.47)
LYMPHOCYTES NFR BLD AUTO: 22 % (ref 14–44)
MCH RBC QN AUTO: 25.3 PG (ref 26.8–34.3)
MCHC RBC AUTO-ENTMCNC: 30 G/DL (ref 31.4–37.4)
MCV RBC AUTO: 85 FL (ref 82–98)
MONOCYTES # BLD AUTO: 0.52 THOUSAND/ΜL (ref 0.17–1.22)
MONOCYTES NFR BLD AUTO: 8 % (ref 4–12)
NEUTROPHILS # BLD AUTO: 4.2 THOUSANDS/ΜL (ref 1.85–7.62)
NEUTS SEG NFR BLD AUTO: 66 % (ref 43–75)
NRBC BLD AUTO-RTO: 0 /100 WBCS
PLATELET # BLD AUTO: 257 THOUSANDS/UL (ref 149–390)
PMV BLD AUTO: 10.3 FL (ref 8.9–12.7)
POTASSIUM SERPL-SCNC: 3.7 MMOL/L (ref 3.5–5.3)
PROT SERPL-MCNC: 7 G/DL (ref 6.4–8.2)
RBC # BLD AUTO: 4.38 MILLION/UL (ref 3.81–5.12)
SODIUM SERPL-SCNC: 141 MMOL/L (ref 136–145)
TSH SERPL DL<=0.05 MIU/L-ACNC: 1.87 UIU/ML (ref 0.36–3.74)
WBC # BLD AUTO: 6.39 THOUSAND/UL (ref 4.31–10.16)

## 2021-01-12 PROCEDURE — 85025 COMPLETE CBC W/AUTO DIFF WBC: CPT

## 2021-01-12 PROCEDURE — 84443 ASSAY THYROID STIM HORMONE: CPT

## 2021-01-12 PROCEDURE — 80053 COMPREHEN METABOLIC PANEL: CPT

## 2021-01-12 PROCEDURE — 36415 COLL VENOUS BLD VENIPUNCTURE: CPT

## 2021-01-12 PROCEDURE — 83036 HEMOGLOBIN GLYCOSYLATED A1C: CPT

## 2021-01-13 ENCOUNTER — IMMUNIZATIONS (OUTPATIENT)
Dept: FAMILY MEDICINE CLINIC | Facility: HOSPITAL | Age: 76
End: 2021-01-13

## 2021-01-13 DIAGNOSIS — Z23 ENCOUNTER FOR IMMUNIZATION: ICD-10-CM

## 2021-01-13 DIAGNOSIS — N39.0 RECURRENT UTI: Primary | ICD-10-CM

## 2021-01-13 PROCEDURE — 91300 SARS-COV-2 / COVID-19 MRNA VACCINE (PFIZER-BIONTECH) 30 MCG: CPT

## 2021-01-13 PROCEDURE — 0001A SARS-COV-2 / COVID-19 MRNA VACCINE (PFIZER-BIONTECH) 30 MCG: CPT

## 2021-01-13 RX ORDER — CIPROFLOXACIN 250 MG/1
TABLET, FILM COATED ORAL
Qty: 40 TABLET | Refills: 0 | Status: SHIPPED | OUTPATIENT
Start: 2021-01-13 | End: 2021-02-13

## 2021-01-22 DIAGNOSIS — R29.898 MUSCULAR DECONDITIONING: Primary | ICD-10-CM

## 2021-01-27 DIAGNOSIS — R11.2 NAUSEA AND VOMITING, INTRACTABILITY OF VOMITING NOT SPECIFIED, UNSPECIFIED VOMITING TYPE: Primary | ICD-10-CM

## 2021-01-27 RX ORDER — ONDANSETRON HYDROCHLORIDE 8 MG/1
8 TABLET, FILM COATED ORAL EVERY 8 HOURS PRN
Qty: 20 TABLET | Refills: 1 | Status: SHIPPED | OUTPATIENT
Start: 2021-01-27 | End: 2021-05-17 | Stop reason: SDUPTHER

## 2021-01-28 DIAGNOSIS — G89.4 CHRONIC PAIN DISORDER: ICD-10-CM

## 2021-01-28 DIAGNOSIS — M54.50 LUMBAR BACK PAIN: ICD-10-CM

## 2021-01-28 DIAGNOSIS — M96.1 POST LAMINECTOMY SYNDROME: ICD-10-CM

## 2021-01-28 RX ORDER — FENTANYL 50 UG/H
1 PATCH TRANSDERMAL
Qty: 5 PATCH | Refills: 0 | Status: SHIPPED | OUTPATIENT
Start: 2021-01-28 | End: 2021-01-28

## 2021-01-28 RX ORDER — FENTANYL 50 UG/H
1 PATCH TRANSDERMAL
Qty: 5 PATCH | Refills: 0 | Status: SHIPPED | OUTPATIENT
Start: 2021-01-28 | End: 2021-02-26 | Stop reason: SDUPTHER

## 2021-01-28 RX ORDER — OXYCODONE AND ACETAMINOPHEN 10; 325 MG/1; MG/1
1 TABLET ORAL EVERY 6 HOURS PRN
Qty: 100 TABLET | Refills: 0 | Status: SHIPPED | OUTPATIENT
Start: 2021-01-28 | End: 2021-02-26 | Stop reason: SDUPTHER

## 2021-02-03 ENCOUNTER — IMMUNIZATIONS (OUTPATIENT)
Dept: FAMILY MEDICINE CLINIC | Facility: HOSPITAL | Age: 76
End: 2021-02-03

## 2021-02-03 DIAGNOSIS — Z23 ENCOUNTER FOR IMMUNIZATION: Primary | ICD-10-CM

## 2021-02-03 PROCEDURE — 91300 SARS-COV-2 / COVID-19 MRNA VACCINE (PFIZER-BIONTECH) 30 MCG: CPT

## 2021-02-03 PROCEDURE — 0002A SARS-COV-2 / COVID-19 MRNA VACCINE (PFIZER-BIONTECH) 30 MCG: CPT

## 2021-02-17 ENCOUNTER — EVALUATION (OUTPATIENT)
Dept: PHYSICAL THERAPY | Facility: CLINIC | Age: 76
End: 2021-02-17
Payer: COMMERCIAL

## 2021-02-17 DIAGNOSIS — R29.898 MUSCULAR DECONDITIONING: Primary | ICD-10-CM

## 2021-02-17 PROCEDURE — 97110 THERAPEUTIC EXERCISES: CPT | Performed by: PHYSICAL THERAPIST

## 2021-02-17 PROCEDURE — 97162 PT EVAL MOD COMPLEX 30 MIN: CPT | Performed by: PHYSICAL THERAPIST

## 2021-02-17 NOTE — PROGRESS NOTES
PT Evaluation     Today's date: 2021  Patient name: Lillian Herrera  : 1945  MRN: [id number]  Referring provider: Caleb Ashley  Dx:   Encounter Diagnosis     ICD-10-CM    1  Muscular deconditioning  [id number] Ambulatory referral to Physical Therapy                  Assessment  Assessment details: Lillian Herrera is a 66 y o  female who presents with generalized deconditioning  She demonstrates abnormal gait, decreased balance, decreased UE, LE, and core strength, decreased endurance and decreased function Patient would benefit from skilled physical therapy in order to address said deficits and improve functional mobility  Impairments: abnormal gait, abnormal or restricted ROM, abnormal movement, activity intolerance, impaired balance, impaired physical strength, lacks appropriate home exercise program, pain with function, safety issue, weight-bearing intolerance and poor posture   Understanding of Dx/Px/POC: good   Prognosis: good    Goals  Independent with HEP     Plan  Patient would benefit from: skilled physical therapy  Planned therapy interventions: home exercise program  Frequency: 2x week  Duration in weeks: 4  Treatment plan discussed with: patient        Subjective Evaluation    History of Present Illness  Mechanism of injury: Patient with history of low back pain and decompressions and fusion yers ago and a decompression/laminectomy ~ 2018  She lives at home with her son, and reports since Covid she has been much more sedentary  She feels unsteady on her feet  She feels good going to grocery store and holding on to a cart (she could would up to an hour like that)  She is a , She is independent with ADLs and uses SPC most of the time only when she is out of the house  Reports she furniture walks at home  She notes no falls in the last 6 months  Patient reports she get pain on right side of L/S down into leg on occasion       She notes she can maybe walk up to 10 minutes with pushing herself without stopping  She continues to get low back pain     Pain  Current pain ratin  At worst pain rating: 10  Location: low back     Patient Goals  Patient goal: increase strength, increase endurance        Objective     Strength/Myotome Testing     Left Shoulder     Planes of Motion   Flexion: 4   Abduction: 4     Right Shoulder     Planes of Motion   Flexion: 4   Abduction: 4     Left Elbow   Flexion: 4  Extension: 4+    Right Elbow   Flexion: 4+  Extension: 4+    Left Hip   Planes of Motion   Flexion: 4  Extension: 4    Right Hip   Planes of Motion   Flexion: 4-  Extension: 4-    Left Knee   Flexion: 4  Extension: 4+    Right Knee   Flexion: 4  Extension: 4+    Left Ankle/Foot   Dorsiflexion: 4+  Plantar flexion: 4-    Right Ankle/Foot   Dorsiflexion: 4-  Plantar flexion: 4-      Flowsheet Rows      Most Recent Value   PT/OT G-Codes   Current Score  54   Projected Score  58             Precautions: fall risk     Manuals                                                                 Neuro Re-Ed             Step ups to foam             SLS             Side step foam beam             Tandem walk foam beam              TB row NV            TB extensions NV                         Ther Ex             bike             Marches at bar 10x            Standing hip abd 10x            Standing hip ext 20x            Mini squats  NV   Leg press          Heel/toe raises  10x ea             LAQ 10x             Ham curl machine  standing NV            SLR flexion 10x            Mini bridge  10x             DB shoulder flexoin 10x            DB shoulder abd 10x            DB bicep curl 10x             TB side step 10x            Ther Activity             Step up and overs NV            Sit to stands from chair with foam NV            Stepping over hurdles fwd/lateral  NV   With foams          Retro walks nicole                                        Gait Training

## 2021-02-19 DIAGNOSIS — I10 ESSENTIAL HYPERTENSION: ICD-10-CM

## 2021-02-19 DIAGNOSIS — J01.90 ACUTE SINUSITIS, RECURRENCE NOT SPECIFIED, UNSPECIFIED LOCATION: Primary | ICD-10-CM

## 2021-02-19 RX ORDER — CEFUROXIME AXETIL 500 MG/1
500 TABLET ORAL EVERY 12 HOURS SCHEDULED
Qty: 20 TABLET | Refills: 0 | Status: SHIPPED | OUTPATIENT
Start: 2021-02-19 | End: 2021-03-01

## 2021-02-19 RX ORDER — LISINOPRIL 20 MG/1
20 TABLET ORAL DAILY
Qty: 30 TABLET | Refills: 3 | Status: SHIPPED | OUTPATIENT
Start: 2021-02-19 | End: 2021-06-02 | Stop reason: SDUPTHER

## 2021-02-23 DIAGNOSIS — J01.90 ACUTE SINUSITIS, RECURRENCE NOT SPECIFIED, UNSPECIFIED LOCATION: Primary | ICD-10-CM

## 2021-02-23 DIAGNOSIS — M54.50 LUMBAR BACK PAIN: ICD-10-CM

## 2021-02-23 DIAGNOSIS — M96.1 POST LAMINECTOMY SYNDROME: ICD-10-CM

## 2021-02-23 DIAGNOSIS — G89.4 CHRONIC PAIN DISORDER: ICD-10-CM

## 2021-02-23 RX ORDER — AZITHROMYCIN 200 MG/5ML
POWDER, FOR SUSPENSION ORAL
Qty: 62.5 ML | Refills: 0 | Status: SHIPPED | OUTPATIENT
Start: 2021-02-23 | End: 2021-02-23

## 2021-02-23 RX ORDER — AZITHROMYCIN 250 MG/1
TABLET, FILM COATED ORAL
Qty: 6 TABLET | Refills: 0 | Status: SHIPPED | OUTPATIENT
Start: 2021-02-23 | End: 2021-02-27

## 2021-02-26 RX ORDER — FENTANYL 50 UG/H
1 PATCH TRANSDERMAL
Qty: 5 PATCH | Refills: 0 | Status: SHIPPED | OUTPATIENT
Start: 2021-02-26 | End: 2021-05-12 | Stop reason: HOSPADM

## 2021-02-26 RX ORDER — OXYCODONE AND ACETAMINOPHEN 10; 325 MG/1; MG/1
1 TABLET ORAL EVERY 6 HOURS PRN
Qty: 100 TABLET | Refills: 0 | Status: SHIPPED | OUTPATIENT
Start: 2021-02-26 | End: 2021-03-25 | Stop reason: SDUPTHER

## 2021-03-03 ENCOUNTER — APPOINTMENT (OUTPATIENT)
Dept: PHYSICAL THERAPY | Facility: CLINIC | Age: 76
End: 2021-03-03
Payer: COMMERCIAL

## 2021-03-04 LAB
LEFT EYE DIABETIC RETINOPATHY: NORMAL
RIGHT EYE DIABETIC RETINOPATHY: NORMAL

## 2021-03-17 ENCOUNTER — EVALUATION (OUTPATIENT)
Dept: PHYSICAL THERAPY | Facility: CLINIC | Age: 76
End: 2021-03-17
Payer: COMMERCIAL

## 2021-03-17 DIAGNOSIS — R29.898 MUSCULAR DECONDITIONING: Primary | ICD-10-CM

## 2021-03-17 PROCEDURE — 97110 THERAPEUTIC EXERCISES: CPT | Performed by: PHYSICAL THERAPIST

## 2021-03-17 PROCEDURE — 97112 NEUROMUSCULAR REEDUCATION: CPT | Performed by: PHYSICAL THERAPIST

## 2021-03-17 PROCEDURE — 97530 THERAPEUTIC ACTIVITIES: CPT | Performed by: PHYSICAL THERAPIST
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
## 2021-03-23 DIAGNOSIS — N39.0 URINARY TRACT INFECTION WITHOUT HEMATURIA, SITE UNSPECIFIED: Primary | ICD-10-CM

## 2021-03-23 RX ORDER — PHENAZOPYRIDINE HYDROCHLORIDE 200 MG/1
200 TABLET, FILM COATED ORAL
Qty: 9 TABLET | Refills: 0 | Status: SHIPPED | OUTPATIENT
Start: 2021-03-23 | End: 2021-05-12 | Stop reason: HOSPADM

## 2021-03-23 RX ORDER — LEVOFLOXACIN 500 MG/1
500 TABLET, FILM COATED ORAL DAILY
Qty: 10 TABLET | Refills: 0 | Status: SHIPPED | OUTPATIENT
Start: 2021-03-23 | End: 2021-04-02

## 2021-03-25 DIAGNOSIS — M96.1 POST LAMINECTOMY SYNDROME: ICD-10-CM

## 2021-03-25 DIAGNOSIS — M54.50 LUMBAR BACK PAIN: ICD-10-CM

## 2021-03-25 RX ORDER — OXYCODONE AND ACETAMINOPHEN 10; 325 MG/1; MG/1
1 TABLET ORAL EVERY 6 HOURS PRN
Qty: 100 TABLET | Refills: 0 | Status: SHIPPED | OUTPATIENT
Start: 2021-03-25 | End: 2021-04-23 | Stop reason: SDUPTHER

## 2021-03-31 DIAGNOSIS — M54.50 LUMBAR BACK PAIN: ICD-10-CM

## 2021-03-31 RX ORDER — BACLOFEN 10 MG/1
10 TABLET ORAL 3 TIMES DAILY PRN
Qty: 30 TABLET | Refills: 3 | Status: SHIPPED | OUTPATIENT
Start: 2021-03-31 | End: 2021-03-31 | Stop reason: SDUPTHER

## 2021-03-31 RX ORDER — BACLOFEN 10 MG/1
10 TABLET ORAL 3 TIMES DAILY PRN
Qty: 30 TABLET | Refills: 3 | Status: SHIPPED | OUTPATIENT
Start: 2021-03-31 | End: 2021-08-09 | Stop reason: SDUPTHER

## 2021-04-14 DIAGNOSIS — M48.062 LUMBAR STENOSIS WITH NEUROGENIC CLAUDICATION: ICD-10-CM

## 2021-04-14 RX ORDER — GABAPENTIN 300 MG/1
CAPSULE ORAL
Qty: 270 CAPSULE | Refills: 3 | Status: SHIPPED | OUTPATIENT
Start: 2021-04-14 | End: 2021-10-18

## 2021-04-20 ENCOUNTER — APPOINTMENT (OUTPATIENT)
Dept: LAB | Facility: CLINIC | Age: 76
End: 2021-04-20
Payer: COMMERCIAL

## 2021-04-20 DIAGNOSIS — N39.0 URINARY TRACT INFECTION WITHOUT HEMATURIA, SITE UNSPECIFIED: ICD-10-CM

## 2021-04-20 PROCEDURE — 87086 URINE CULTURE/COLONY COUNT: CPT

## 2021-04-22 LAB — BACTERIA UR CULT: NORMAL

## 2021-04-23 DIAGNOSIS — M54.50 LUMBAR BACK PAIN: ICD-10-CM

## 2021-04-23 DIAGNOSIS — M48.061 SPINAL STENOSIS OF LUMBAR REGION, UNSPECIFIED WHETHER NEUROGENIC CLAUDICATION PRESENT: ICD-10-CM

## 2021-04-23 DIAGNOSIS — M96.1 POST LAMINECTOMY SYNDROME: ICD-10-CM

## 2021-04-23 RX ORDER — OXYCODONE AND ACETAMINOPHEN 10; 325 MG/1; MG/1
1 TABLET ORAL EVERY 6 HOURS PRN
Qty: 100 TABLET | Refills: 0 | Status: SHIPPED | OUTPATIENT
Start: 2021-04-23 | End: 2021-05-25 | Stop reason: SDUPTHER

## 2021-04-23 RX ORDER — NITROFURANTOIN MACROCRYSTALS 50 MG/1
50 CAPSULE ORAL
Qty: 30 CAPSULE | Refills: 3 | Status: SHIPPED | OUTPATIENT
Start: 2021-04-23 | End: 2021-09-10 | Stop reason: SDUPTHER

## 2021-04-25 DIAGNOSIS — K57.92 ACUTE DIVERTICULITIS: Primary | ICD-10-CM

## 2021-04-25 RX ORDER — LEVOFLOXACIN 500 MG/1
500 TABLET, FILM COATED ORAL EVERY 24 HOURS
Qty: 10 TABLET | Refills: 0 | Status: SHIPPED | OUTPATIENT
Start: 2021-04-25 | End: 2021-05-05

## 2021-04-25 RX ORDER — METRONIDAZOLE 500 MG/1
500 TABLET ORAL EVERY 12 HOURS SCHEDULED
Qty: 20 TABLET | Refills: 0 | Status: SHIPPED | OUTPATIENT
Start: 2021-04-25 | End: 2021-05-05

## 2021-05-10 ENCOUNTER — HOSPITAL ENCOUNTER (OUTPATIENT)
Facility: HOSPITAL | Age: 76
Setting detail: OBSERVATION
Discharge: HOME | End: 2021-05-12
Attending: EMERGENCY MEDICINE | Admitting: STUDENT IN AN ORGANIZED HEALTH CARE EDUCATION/TRAINING PROGRAM
Payer: COMMERCIAL

## 2021-05-10 ENCOUNTER — APPOINTMENT (EMERGENCY)
Dept: CT IMAGING | Facility: HOSPITAL | Age: 76
End: 2021-05-10
Payer: COMMERCIAL

## 2021-05-10 DIAGNOSIS — R10.9 RIGHT SIDED ABDOMINAL PAIN: Primary | ICD-10-CM

## 2021-05-10 DIAGNOSIS — K31.84 GASTROPARESIS: ICD-10-CM

## 2021-05-10 DIAGNOSIS — R63.30 POOR FEEDING: ICD-10-CM

## 2021-05-10 LAB
ALBUMIN SERPL BCP-MCNC: 3.4 G/DL (ref 3.5–5)
ALP SERPL-CCNC: 157 U/L (ref 46–116)
ALT SERPL W P-5'-P-CCNC: 21 U/L (ref 12–78)
ANION GAP SERPL CALCULATED.3IONS-SCNC: 8 MMOL/L (ref 4–13)
AST SERPL W P-5'-P-CCNC: 21 U/L (ref 5–45)
BACTERIA UR QL AUTO: NORMAL /HPF
BASOPHILS # BLD AUTO: 0.09 THOUSANDS/ΜL (ref 0–0.1)
BASOPHILS NFR BLD AUTO: 1 % (ref 0–1)
BILIRUB SERPL-MCNC: 0.4 MG/DL (ref 0.2–1)
BILIRUB UR QL STRIP: NEGATIVE
BUN SERPL-MCNC: 13 MG/DL (ref 5–25)
CALCIUM ALBUM COR SERPL-MCNC: 10 MG/DL (ref 8.3–10.1)
CALCIUM SERPL-MCNC: 9.5 MG/DL (ref 8.3–10.1)
CHLORIDE SERPL-SCNC: 102 MMOL/L (ref 100–108)
CLARITY UR: CLEAR
CO2 SERPL-SCNC: 30 MMOL/L (ref 21–32)
COLOR UR: YELLOW
CREAT SERPL-MCNC: 1.08 MG/DL (ref 0.6–1.3)
EOSINOPHIL # BLD AUTO: 0.21 THOUSAND/ΜL (ref 0–0.61)
EOSINOPHIL NFR BLD AUTO: 3 % (ref 0–6)
ERYTHROCYTE [DISTWIDTH] IN BLOOD BY AUTOMATED COUNT: 15.1 % (ref 11.6–15.1)
GFR SERPL CREATININE-BSD FRML MDRD: 49 ML/MIN/1.73SQ M
GLUCOSE SERPL-MCNC: 168 MG/DL (ref 65–140)
GLUCOSE UR STRIP-MCNC: NEGATIVE MG/DL
HCT VFR BLD AUTO: 38.8 % (ref 34.8–46.1)
HGB BLD-MCNC: 11.9 G/DL (ref 11.5–15.4)
HGB UR QL STRIP.AUTO: ABNORMAL
HOLD SPECIMEN: NORMAL
IMM GRANULOCYTES # BLD AUTO: 0.03 THOUSAND/UL (ref 0–0.2)
IMM GRANULOCYTES NFR BLD AUTO: 0 % (ref 0–2)
KETONES UR STRIP-MCNC: NEGATIVE MG/DL
LEUKOCYTE ESTERASE UR QL STRIP: NEGATIVE
LIPASE SERPL-CCNC: 67 U/L (ref 73–393)
LYMPHOCYTES # BLD AUTO: 1.56 THOUSANDS/ΜL (ref 0.6–4.47)
LYMPHOCYTES NFR BLD AUTO: 20 % (ref 14–44)
MCH RBC QN AUTO: 26 PG (ref 26.8–34.3)
MCHC RBC AUTO-ENTMCNC: 30.7 G/DL (ref 31.4–37.4)
MCV RBC AUTO: 85 FL (ref 82–98)
MONOCYTES # BLD AUTO: 0.61 THOUSAND/ΜL (ref 0.17–1.22)
MONOCYTES NFR BLD AUTO: 8 % (ref 4–12)
NEUTROPHILS # BLD AUTO: 5.18 THOUSANDS/ΜL (ref 1.85–7.62)
NEUTS SEG NFR BLD AUTO: 68 % (ref 43–75)
NITRITE UR QL STRIP: NEGATIVE
NON-SQ EPI CELLS URNS QL MICRO: NORMAL /HPF
NRBC BLD AUTO-RTO: 0 /100 WBCS
PH UR STRIP.AUTO: 5.5 [PH] (ref 4.5–8)
PLATELET # BLD AUTO: 252 THOUSANDS/UL (ref 149–390)
PMV BLD AUTO: 10.4 FL (ref 8.9–12.7)
POTASSIUM SERPL-SCNC: 3.9 MMOL/L (ref 3.5–5.3)
PROT SERPL-MCNC: 7.4 G/DL (ref 6.4–8.2)
PROT UR STRIP-MCNC: NEGATIVE MG/DL
RBC # BLD AUTO: 4.57 MILLION/UL (ref 3.81–5.12)
RBC #/AREA URNS AUTO: NORMAL /HPF
SODIUM SERPL-SCNC: 140 MMOL/L (ref 136–145)
SP GR UR STRIP.AUTO: >=1.03 (ref 1–1.03)
UROBILINOGEN UR QL STRIP.AUTO: 0.2 E.U./DL
WBC # BLD AUTO: 7.68 THOUSAND/UL (ref 4.31–10.16)
WBC #/AREA URNS AUTO: NORMAL /HPF

## 2021-05-10 PROCEDURE — 85025 COMPLETE CBC W/AUTO DIFF WBC: CPT | Performed by: EMERGENCY MEDICINE

## 2021-05-10 PROCEDURE — 74176 CT ABD & PELVIS W/O CONTRAST: CPT

## 2021-05-10 PROCEDURE — G1004 CDSM NDSC: HCPCS

## 2021-05-10 PROCEDURE — 99285 EMERGENCY DEPT VISIT HI MDM: CPT

## 2021-05-10 PROCEDURE — 83690 ASSAY OF LIPASE: CPT | Performed by: EMERGENCY MEDICINE

## 2021-05-10 PROCEDURE — 81001 URINALYSIS AUTO W/SCOPE: CPT

## 2021-05-10 PROCEDURE — 96376 TX/PRO/DX INJ SAME DRUG ADON: CPT

## 2021-05-10 PROCEDURE — 93005 ELECTROCARDIOGRAM TRACING: CPT

## 2021-05-10 PROCEDURE — 80053 COMPREHEN METABOLIC PANEL: CPT | Performed by: EMERGENCY MEDICINE

## 2021-05-10 PROCEDURE — 99284 EMERGENCY DEPT VISIT MOD MDM: CPT | Performed by: EMERGENCY MEDICINE

## 2021-05-10 PROCEDURE — 96361 HYDRATE IV INFUSION ADD-ON: CPT

## 2021-05-10 PROCEDURE — 96374 THER/PROPH/DIAG INJ IV PUSH: CPT

## 2021-05-10 PROCEDURE — 36415 COLL VENOUS BLD VENIPUNCTURE: CPT

## 2021-05-10 PROCEDURE — 83036 HEMOGLOBIN GLYCOSYLATED A1C: CPT | Performed by: NURSE PRACTITIONER

## 2021-05-10 RX ORDER — HYDROMORPHONE HCL/PF 1 MG/ML
0.5 SYRINGE (ML) INJECTION ONCE
Status: COMPLETED | OUTPATIENT
Start: 2021-05-10 | End: 2021-05-10

## 2021-05-10 RX ORDER — DICYCLOMINE HCL 20 MG
20 TABLET ORAL ONCE
Status: COMPLETED | OUTPATIENT
Start: 2021-05-10 | End: 2021-05-10

## 2021-05-10 RX ORDER — SUCRALFATE 1 G/1
1 TABLET ORAL ONCE
Status: COMPLETED | OUTPATIENT
Start: 2021-05-10 | End: 2021-05-11

## 2021-05-10 RX ORDER — FAMOTIDINE 20 MG/1
20 TABLET, FILM COATED ORAL ONCE
Status: COMPLETED | OUTPATIENT
Start: 2021-05-10 | End: 2021-05-11

## 2021-05-10 RX ADMIN — SODIUM CHLORIDE 1000 ML: 0.9 INJECTION, SOLUTION INTRAVENOUS at 19:42

## 2021-05-10 RX ADMIN — DICYCLOMINE HYDROCHLORIDE 20 MG: 20 TABLET ORAL at 19:41

## 2021-05-10 RX ADMIN — HYDROMORPHONE HYDROCHLORIDE 0.5 MG: 1 INJECTION, SOLUTION INTRAMUSCULAR; INTRAVENOUS; SUBCUTANEOUS at 22:28

## 2021-05-10 RX ADMIN — HYDROMORPHONE HYDROCHLORIDE 0.5 MG: 1 INJECTION, SOLUTION INTRAMUSCULAR; INTRAVENOUS; SUBCUTANEOUS at 18:48

## 2021-05-10 NOTE — ED NOTES
Pt urinated but did not obtain specimen    IV fluids running and she is aware of need for specimen     Rosario Jasso RN  05/10/21 1946

## 2021-05-10 NOTE — ED PROVIDER NOTES
History  Chief Complaint   Patient presents with    Abdominal Pain     pt on abx for diverticulitis, reports symptoms are not improving  History provided by:  Patient   used: No    Abdominal Pain  Associated symptoms: diarrhea and nausea    Associated symptoms: no dysuria, no fatigue, no fever, no shortness of breath and no vomiting     58-year-old female presented for evaluation of about 2 weeks of right lower quadrant pain  She was treated with Levaquin/Flagyl for suspected diverticulitis, having had similar symptoms in the past   She states after she stopped antibiotics a few days ago she started feeling worse  Pain has continued to worsen  She has had some nausea but no vomiting, loss of appetite  States she is having loose stools that are light in color  No fever, chills  Pain overall is moderate, constant with some radiation towards the back  She feels bloated  History of cholecystectomy, appendectomy  On exam she seems uncomfortable  She has reproducible tenderness in the right lower quadrant without rebound or guarding  Prior to Admission Medications   Prescriptions Last Dose Informant Patient Reported? Taking?    IRON PO 5/9/2021 at Unknown time Self Yes Yes   Sig: Take 65 mg by mouth 2 (two) times a day     Milnacipran HCl (Savella) 100 MG TABS 5/9/2021 at Unknown time Child No Yes   Sig: Take 1 tablet (100 mg total) by mouth daily   albuterol (PROVENTIL HFA,VENTOLIN HFA) 90 mcg/act inhaler More than a month at Unknown time Child No No   Sig: Inhale 2 puffs every 6 (six) hours as needed for wheezing or shortness of breath   baclofen 10 mg tablet 5/9/2021 at Unknown time Child No Yes   Sig: Take 1 tablet (10 mg total) by mouth 3 (three) times a day as needed for muscle spasms   cyclobenzaprine (FLEXERIL) 10 mg tablet Not Taking at Unknown time Self No No   Sig: Take 1 tablet (10 mg total) by mouth daily at bedtime   Patient not taking: Reported on 5/10/2021   fentaNYL ([address]) 50 mcg/hr Not Taking at Unknown time  No No   Sig: Place 1 patch on the skin every third dayMax Daily Amount: 1 patch   Patient not taking: Reported on 5/10/2021   gabapentin (NEURONTIN) 300 mg capsule 2021 at Unknown time Child No Yes   Si in am and 2 hs   glucose blood test strip  Child No No   Sig: Bid testing   hyoscyamine (ANASPAZ) 0 125 mg Past Week at Unknown time Self No Yes   Sig: Take 1 tablet (0 125 mg total) by mouth every 4 (four) hours as needed (spasms) for up to 10 days   latanoprost (XALATAN) 0 005 % ophthalmic solution 2021 at Unknown time Child No Yes   Sig: Administer 1 drop to both eyes daily at bedtime   levothyroxine 25 mcg tablet 5/10/2021 at Unknown time Child No Yes   Sig: Take 1 5 tablets (37 5 mcg total) by mouth daily   lisinopril (ZESTRIL) 20 mg tablet 5/10/2021 at Unknown time Child No Yes   Sig: Take 1 tablet (20 mg total) by mouth daily   meclizine (ANTIVERT) 25 mg tablet   No No   Sig: Take 1 tablet (25 mg total) by mouth 4 (four) times a day as needed for dizziness   metFORMIN (GLUCOPHAGE) 1000 MG tablet 2021 at Unknown time Child No Yes   Sig: Take 1 tablet (1,000 mg total) by mouth 2 (two) times a day with meals   metoclopramide (REGLAN) 5 mg tablet 5/10/2021 at Unknown time Self No Yes   Sig: Take 1 tablet (5 mg total) by mouth 3 (three) times a day   neomycin-polymyxin-hydrocortisone (CORTISPORIN) otic solution Not Taking at Unknown time Child No No   Sig: Administer 4 drops into both ears every 6 (six) hours   nitrofurantoin (MACRODANTIN) 50 mg capsule 2021 at Unknown time Child No Yes   Sig: Take 1 capsule (50 mg total) by mouth daily at bedtime   oxybutynin (DITROPAN) 5 mg tablet 2021 at Unknown time  No Yes   Sig: Take 1 tablet (5 mg total) by mouth 3 (three) times a day   pantoprazole (PROTONIX) 40 mg tablet 2021 at Unknown time Self No Yes   Sig: Take 1 tablet (40 mg total) by mouth 2 (two) times daily after meals   phenazopyridine (PYRIDIUM) 200 mg tablet Not Taking at Unknown time  No No   Sig: Take 1 tablet (200 mg total) by mouth 3 (three) times a day with meals   Patient not taking: Reported on 5/10/2021   pravastatin (PRAVACHOL) 10 mg tablet 5/9/2021 at Unknown time Child No Yes   Sig: Take 1 tablet (10 mg total) by mouth daily at bedtime   promethazine (PHENERGAN) 25 mg tablet Not Taking at Unknown time Self No No   Sig: Take 1 tablet (25 mg total) by mouth every 6 (six) hours as needed for nausea or vomiting   Patient not taking: Reported on 5/10/2021      Facility-Administered Medications: None       Past Medical History:   Diagnosis Date    Acid reflux     Anxiety     Arthritis     Asthma     Basal cell carcinoma     upper lip    Chronic narcotic dependence (HCC)     Chronic pain     Colon polyp     Cystocele     Diabetes mellitus ([facility] [address] )     Diverticulosis     Dysfunctional uterine bleeding     last assessed - [id number]    Fibromyalgia     Gastric ulcer     Gastroparesis     History of colonic polyps     last assessed - [id number]    History of gastroesophageal reflux (GERD)     Hypercholesterolemia     Hyperlipidemia     Hypertension     IBS (irritable bowel syndrome)     Kidney stone     Post laminectomy syndrome     Seasonal allergies     Spinal stenosis        Past Surgical History:   Procedure Laterality Date    APPENDECTOMY      BACK SURGERY      BREAST CYST ASPIRATION Left     pt unsure what exactly was removed    CHOLECYSTECTOMY      ESOPHAGOGASTRODUODENOSCOPY N/A 9/28/2016    Procedure: ESOPHAGOGASTRODUODENOSCOPY (EGD); Surgeon: Nori Burt MD;  [facility];   Service:     HERNIA REPAIR      HYSTERECTOMY      TTAH-BSO age 25   [place] LAMINECTOMY      LUMBAR LAMINECTOMY      OOPHORECTOMY      age 25   [place] ARTHRODESIS POSTERIOR/POSTEROLATERAL THORACIC N/A 6/4/2018    Procedure: Reopening of lumbar incision for T12-L5 posterior instrumented fixation and fusion and T12-L4 posterior decompression;  Surgeon: Delia Joseph MD;  [facility];  Service: Neurosurgery    NC COLONOSCOPY FLX DX W/COLLJ SPEC WHEN PFRMD N/A 3/2/2016    Procedure: EGD AND COLONOSCOPY;  Surgeon: Lois Avalos MD;  [facility]; Service: Gastroenterology    NC DILATE ESOPHAGUS N/A 2016    Procedure: DILATATION ESOPHAGEAL;  Surgeon: Lois Avalos MD;  [facility]; Service: Gastroenterology    NC ESOPHAGOGASTRODUODENOSCOPY TRANSORAL DIAGNOSTIC N/A 2016    Procedure: ESOPHAGOGASTRODUODENOSCOPY (EGD); Surgeon: Lois Avalos MD;  [facility]; Service: Gastroenterology    NC IMPLANT SPINAL NEUROSTIM/ Left 2018    Procedure: removal of left buttock implantable pulse generator and placement of new  implantable pulse generator;  Surgeon: Delia Joseph MD;  [facility];  Service: Neurosurgery       Family History   Problem Relation Age of Onset    Lung cancer Mother 55    Pulmonary embolism Father     Stroke Maternal Grandmother     Heart attack Maternal Grandfather     No Known Problems Paternal Grandmother     No Known Problems Paternal Grandfather     Diabetes Family         Diabetes mellitus    Hypertension Family     Stroke Family         Stroke complications    No Known Problems Daughter     No Known Problems Daughter     No Known Problems Sister     No Known Problems Maternal Aunt      I have reviewed and agree with the history as documented      E-Cigarette/Vaping    E-Cigarette Use Never User      E-Cigarette/Vaping Substances     Social History     Tobacco Use    Smoking status: Former Smoker     Quit date: 1970     Years since quittin 4    Smokeless tobacco: Never Used    Tobacco comment: Denied history of current ever day smoker, Former smoker and Never smoker all documented in [employer]   Substance Use Topics    Alcohol use: Not Currently     Comment: Denied history of alcohol use    Drug use: No     Comment: Denied history of drug use       Review of Systems   Constitutional: Positive for appetite change  Negative for activity change, fatigue and fever  Respiratory: Negative for chest tightness and shortness of breath  Gastrointestinal: Positive for abdominal pain, diarrhea and nausea  Negative for blood in stool and vomiting  Genitourinary: Negative for difficulty urinating and dysuria  Musculoskeletal: Negative for back pain and neck pain  Neurological: Negative for dizziness and weakness  All other systems reviewed and are negative  Physical Exam  Physical Exam  Vitals signs and nursing note reviewed  Constitutional:       Comments: Appears uncomfortable  Cardiovascular:      Rate and Rhythm: Regular rhythm  Tachycardia present  Pulmonary:      Effort: Pulmonary effort is normal  No respiratory distress  Abdominal:      Palpations: Abdomen is soft  Hernia: No hernia is present  Comments: Moderate right lower quadrant tenderness without rebound or guarding  Skin:     General: Skin is warm and dry  Findings: No rash  Neurological:      General: No focal deficit present  Mental Status: She is alert and oriented to person, place, and time     Psychiatric:         Mood and Affect: Mood normal          Behavior: Behavior normal          Vital Signs  ED Triage Vitals   Temperature Pulse Respirations Blood Pressure SpO2   05/10/21 1408 05/10/21 1408 05/10/21 1408 05/10/21 1408 05/10/21 1408   99 °F (37 2 °C) 104 18 (!) 144/103 98 %      Temp Source Heart Rate Source Patient Position - Orthostatic VS BP Location FiO2 (%)   05/11/21 1011 05/10/21 1650 05/10/21 1650 05/10/21 1650 --   Oral Monitor Sitting Left arm       Pain Score       05/10/21 1408       8           Vitals:    05/11/21 1529 05/11/21 2211 05/11/21 2229 05/12/21 0727   BP: 142/70 (!) 199/89 145/60 169/76   Pulse: 73 81  98   Patient Position - Orthostatic VS: Sitting Sitting  Sitting         Visual Acuity      ED Medications  Medications   HYDROmorphone (DILAUDID) injection 0 5 mg (0 5 mg Intravenous Given 5/10/21 1848)   dicyclomine (BENTYL) tablet 20 mg (20 mg Oral Given 5/10/21 1941)   sodium chloride 0 9 % bolus 1,000 mL (0 mL Intravenous Stopped 5/10/21 2158)   HYDROmorphone (DILAUDID) injection 0 5 mg (0 5 mg Intravenous Given 5/10/21 2228)   famotidine (PEPCID) tablet 20 mg (20 mg Oral Given 5/11/21 0028)   sucralfate (CARAFATE) tablet 1 g (1 g Oral Given 5/11/21 0028)   lactated ringers infusion (75 mL/hr Intravenous New Bag 5/11/21 0135)   simethicone (MYLICON) chewable tablet 80 mg (80 mg Oral Given 5/11/21 0130)       Diagnostic Studies  Results Reviewed     Procedure Component Value Units Date/Time    Comprehensive metabolic panel [[id number]]  (Abnormal) Collected: 05/12/21 0551    Lab Status: Final result Specimen: Blood from Arm, Right Updated: 05/12/21 0636     Sodium 142 mmol/L      Potassium 3 8 mmol/L      Chloride 106 mmol/L      CO2 28 mmol/L      ANION GAP 8 mmol/L      BUN 11 mg/dL      Creatinine 0 96 mg/dL      Glucose 141 mg/dL      Glucose, Fasting 141 mg/dL      Calcium 9 0 mg/dL      Corrected Calcium 10 0 mg/dL      AST 18 U/L      ALT 14 U/L      Alkaline Phosphatase 122 U/L      Total Protein 6 2 g/dL      Albumin 2 8 g/dL      Total Bilirubin 0 47 mg/dL      eGFR 57 ml/min/1 73sq m     Narrative:      [place] guidelines for Chronic Kidney Disease (CKD):     Stage 1 with normal or high GFR (GFR > 90 mL/min/1 73 square meters)    Stage 2 Mild CKD (GFR = 60-89 mL/min/1 73 square meters)    Stage 3A Moderate CKD (GFR = 45-59 mL/min/1 73 square meters)    Stage 3B Moderate CKD (GFR = 30-44 mL/min/1 73 square meters)    Stage 4 Severe CKD (GFR = 15-29 mL/min/1 73 square meters)    Stage 5 End Stage CKD (GFR <15 mL/min/1 73 square meters)  Note: GFR calculation is accurate only with a steady state creatinine    CBC and differential [[id number]]  (Abnormal) Collected: 05/12/21 0551    Lab Status: Final result Specimen: Blood from Arm, Right Updated: 05/12/21 0608     WBC 5 60 Thousand/uL      RBC 4 14 Million/uL      Hemoglobin 10 7 g/dL      Hematocrit 35 3 %      MCV 85 fL      MCH 25 8 pg      MCHC 30 3 g/dL      RDW 14 9 %      MPV 10 5 fL      Platelets 828 Thousands/uL      nRBC 0 /100 WBCs      Neutrophils Relative 58 %      Immat GRANS % 0 %      Lymphocytes Relative 26 %      Monocytes Relative 9 %      Eosinophils Relative 6 %      Basophils Relative 1 %      Neutrophils Absolute 3 22 Thousands/µL      Immature Grans Absolute 0 02 Thousand/uL      Lymphocytes Absolute 1 47 Thousands/µL      Monocytes Absolute 0 51 Thousand/µL      Eosinophils Absolute 0 32 Thousand/µL      Basophils Absolute 0 06 Thousands/µL     Hemoglobin A1C [[id number]]  (Abnormal) Collected: 05/10/21 1414    Lab Status: Final result Specimen: Blood Updated: 05/11/21 0646     Hemoglobin A1C 7 3 %       mg/dl     Comprehensive metabolic panel [[id number]]  (Abnormal) Collected: 05/11/21 0513    Lab Status: Final result Specimen: Blood from Arm, Right Updated: 05/11/21 0548     Sodium 140 mmol/L      Potassium 3 6 mmol/L      Chloride 104 mmol/L      CO2 30 mmol/L      ANION GAP 6 mmol/L      BUN 13 mg/dL      Creatinine 0 94 mg/dL      Glucose 153 mg/dL      Glucose, Fasting 153 mg/dL      Calcium 8 7 mg/dL      Corrected Calcium 9 7 mg/dL      AST 17 U/L      ALT 17 U/L      Alkaline Phosphatase 126 U/L      Total Protein 6 2 g/dL      Albumin 2 8 g/dL      Total Bilirubin 0 49 mg/dL      eGFR 58 ml/min/1 73sq m     Narrative:      [place] guidelines for Chronic Kidney Disease (CKD):     Stage 1 with normal or high GFR (GFR > 90 mL/min/1 73 square meters)    Stage 2 Mild CKD (GFR = 60-89 mL/min/1 73 square meters)    Stage 3A Moderate CKD (GFR = 45-59 mL/min/1 73 square meters)    Stage 3B Moderate CKD (GFR = 30-44 mL/min/1 73 square meters)    Stage 4 Severe CKD (GFR = 15-29 mL/min/1 73 square meters)    Stage 5 End Stage CKD (GFR <15 mL/min/1 73 square meters)  Note: GFR calculation is accurate only with a steady state creatinine    CBC and differential [[id number]]  (Abnormal) Collected: 05/11/21 0513    Lab Status: Final result Specimen: Blood from Arm, Right Updated: 05/11/21 0522     WBC 6 90 Thousand/uL      RBC 3 90 Million/uL      Hemoglobin 10 2 g/dL      Hematocrit 33 5 %      MCV 86 fL      MCH 26 2 pg      MCHC 30 4 g/dL      RDW 15 5 %      MPV 10 2 fL      Platelets 159 Thousands/uL      nRBC 0 /100 WBCs      Neutrophils Relative 58 %      Immat GRANS % 0 %      Lymphocytes Relative 28 %      Monocytes Relative 10 %      Eosinophils Relative 3 %      Basophils Relative 1 %      Neutrophils Absolute 4 01 Thousands/µL      Immature Grans Absolute 0 01 Thousand/uL      Lymphocytes Absolute 1 93 Thousands/µL      Monocytes Absolute 0 69 Thousand/µL      Eosinophils Absolute 0 20 Thousand/µL      Basophils Absolute 0 06 Thousands/µL     Lactic acid, plasma [[id number]]  (Normal) Collected: 05/11/21 0028    Lab Status: Final result Specimen: Blood from Arm, Left Updated: 05/11/21 0106     LACTIC ACID 0 9 mmol/L     Narrative:      Result may be elevated if tourniquet was used during collection      Occult blood 1-3, stool [[id number]]     Lab Status: No result Specimen: Stool     Clostridium difficile toxin by PCR with EIA [[id number]]     Lab Status: No result Specimen: Stool from Per Rectum     Stool Enteric Bacterial Panel by PCR [[id number]]     Lab Status: No result Specimen: Stool from Rectum     Urine Microscopic [[id number]]  (Normal) Collected: 05/10/21 2215    Lab Status: Final result Specimen: Urine, Clean Catch Updated: 05/10/21 2313     RBC, UA 2-4 /hpf      WBC, UA 0-1 /hpf      Epithelial Cells Occasional /hpf      Bacteria, UA Occasional /hpf     Urine Macroscopic, POC [[id number]]  (Abnormal) Collected: 05/10/21 2215    Lab Status: Final result Specimen: Urine Updated: 05/10/21 2216     Color, UA Yellow     Clarity, UA Clear     pH, UA 5 5     Leukocytes, UA Negative     Nitrite, UA Negative     Protein, UA Negative mg/dl      Glucose, UA Negative mg/dl      Ketones, UA Negative mg/dl      Urobilinogen, UA 0 2 E U /dl      Bilirubin, UA Negative     Blood, UA Trace     Specific Gravity, UA >=1 030    Narrative:      CLINITEK RESULT    Comprehensive metabolic panel [[id number]]  (Abnormal) Collected: 05/10/21 1414    Lab Status: Final result Specimen: Blood Updated: 05/10/21 1448     Sodium 140 mmol/L      Potassium 3 9 mmol/L      Chloride 102 mmol/L      CO2 30 mmol/L      ANION GAP 8 mmol/L      BUN 13 mg/dL      Creatinine 1 08 mg/dL      Glucose 168 mg/dL      Calcium 9 5 mg/dL      Corrected Calcium 10 0 mg/dL      AST 21 U/L      ALT 21 U/L      Alkaline Phosphatase 157 U/L      Total Protein 7 4 g/dL      Albumin 3 4 g/dL      Total Bilirubin 0 40 mg/dL      eGFR 49 ml/min/1 73sq m     Narrative:      [place] guidelines for Chronic Kidney Disease (CKD):     Stage 1 with normal or high GFR (GFR > 90 mL/min/1 73 square meters)    Stage 2 Mild CKD (GFR = 60-89 mL/min/1 73 square meters)    Stage 3A Moderate CKD (GFR = 45-59 mL/min/1 73 square meters)    Stage 3B Moderate CKD (GFR = 30-44 mL/min/1 73 square meters)    Stage 4 Severe CKD (GFR = 15-29 mL/min/1 73 square meters)    Stage 5 End Stage CKD (GFR <15 mL/min/1 73 square meters)  Note: GFR calculation is accurate only with a steady state creatinine    Lipase [[id number]]  (Abnormal) Collected: 05/10/21 1414    Lab Status: Final result Specimen: Blood Updated: 05/10/21 1448     Lipase 67 u/L     CBC and differential [[id number]]  (Abnormal) Collected: 05/10/21 1414    Lab Status: Final result Specimen: Blood Updated: 05/10/21 1427     WBC 7 68 Thousand/uL      RBC 4 57 Million/uL      Hemoglobin 11 9 g/dL      Hematocrit 38 8 %      MCV 85 fL      MCH 26 0 pg      MCHC 30 7 g/dL      RDW 15 1 %      MPV 10 4 fL      Platelets 851 Thousands/uL      nRBC 0 /100 WBCs      Neutrophils Relative 68 %      Immat GRANS % 0 %      Lymphocytes Relative 20 %      Monocytes Relative 8 %      Eosinophils Relative 3 %      Basophils Relative 1 %      Neutrophils Absolute 5 18 Thousands/µL      Immature Grans Absolute 0 03 Thousand/uL      Lymphocytes Absolute 1 56 Thousands/µL      Monocytes Absolute 0 61 Thousand/µL      Eosinophils Absolute 0 21 Thousand/µL      Basophils Absolute 0 09 Thousands/µL                  CT abdomen pelvis wo contrast   Final Result by William Bernal MD (05/10 9719)   Limited exam without IV and oral contrast particularly for soft tissue and bowel evaluation  1  No acute findings in the abdomen and pelvis  Workstation performed: [id number]                    Procedures  ECG 12 Lead Documentation Only    Date/Time: 5/10/2021 5:38 PM  Performed by: Marie Graham MD  Authorized by: Marie Graham MD     Indications / Diagnosis:  Abdominal pain  ECG reviewed by me, the ED Provider: yes    Patient location:  ED  Previous ECG:     Previous ECG:  Compared to current    Comparison ECG info:  7/15/20    Similarity:  No change  Rate:     ECG rate:  99  Rhythm:     Rhythm: sinus rhythm    Ectopy:     Ectopy: PAC    QRS:     QRS axis:  Left  Conduction:     Conduction: abnormal      Abnormal conduction: complete LBBB    ST segments:     ST segments:  Normal  T waves:     T waves: normal               ED Course  ED Course as of May 26 0932   Mon May 10, 2021   2019 Discussed CT results  Patient still having pain  Awaiting urine sample  Will PO challenge  SBIRT 22yo+      Most Recent Value   SBIRT (24 yo +)   In order to provide better care to our patients, we are screening all of our patients for alcohol and drug use  Would it be okay to ask you these screening questions?   Unable to answer at this time Filed at: 05/10/2021 0269 MDM  Number of Diagnoses or Management Options  Poor feeding: new and requires workup  Right sided abdominal pain: new and requires workup  Diagnosis management comments: 65 y/o female with about 2 weeks of RLQ pain associated with some nausea, diarrhea, and loss of appetite  Was treated with abx for suspected diverticulitis without improvement  Labs and CT without significant findings but patient still having moderate pain, not able to eat well  Admitted for hydration, monitoring, and further w/o as needed  Amount and/or Complexity of Data Reviewed  Clinical lab tests: ordered and reviewed  Tests in the radiology section of CPT®: ordered and reviewed  Discuss the patient with other providers: yes  Independent visualization of images, tracings, or specimens: yes    Patient Progress  Patient progress: stable      Disposition  Final diagnoses:   Right sided abdominal pain   Poor feeding     Time reflects when diagnosis was documented in both MDM as applicable and the Disposition within this note     Time User Action Codes Description Comment    5/10/2021 11:20 PM Jarred MIRANDA Add [R10 9] Right sided abdominal pain     5/10/2021 11:20 PM Italo [address]urn [address]  [R63 3] Poor feeding     5/12/2021  2:49 PM Dion Elkins Add [K31 84] Gastroparesis       ED Disposition     ED Disposition Condition Date/Time Comment    Admit Stable Mon May 10, 2021 11:20 PM Case was discussed with Sandy Burger and the patient's admission status was agreed to be Admission Status: observation status to the service of Dr Mani Damian           Follow-up Information     Follow up With Specialties Details Why [address], [address] [place], Nurse Practitioner Schedule an appointment as soon as possible for a visit in 1 week(s)  [address] [address]  [phone]            Discharge Medication List as of 5/12/2021  2:55 PM      START taking these medications    Details simethicone (MYLICON) 80 mg chewable tablet Chew 1 tablet (80 mg total) 4 (four) times a day as needed for flatulence, Starting Wed 5/12/2021, Normal         CONTINUE these medications which have CHANGED    Details   metoclopramide (REGLAN) 5 mg tablet Take 1 tablet (5 mg total) by mouth 3 (three) times a day, Starting Wed 5/12/2021, Normal         CONTINUE these medications which have NOT CHANGED    Details   albuterol (PROVENTIL HFA,VENTOLIN HFA) 90 mcg/act inhaler Inhale 2 puffs every 6 (six) hours as needed for wheezing or shortness of breath, Starting Mon 5/11/2020, Normal      baclofen 10 mg tablet Take 1 tablet (10 mg total) by mouth 3 (three) times a day as needed for muscle spasms, Starting Wed 3/31/2021, Until Mon 5/10/2021, Normal      gabapentin (NEURONTIN) 300 mg capsule 1 in am and 2 hs, Normal      glucose blood test strip Bid testing, Print      latanoprost (XALATAN) 0 005 % ophthalmic solution Administer 1 drop to both eyes daily at bedtime, Starting Fri 10/2/2020, Until Mon 5/10/2021, Normal      levothyroxine 25 mcg tablet Take 1 5 tablets (37 5 mcg total) by mouth daily, Starting Wed 12/30/2020, Until Mon 5/10/2021, Normal      lisinopril (ZESTRIL) 20 mg tablet Take 1 tablet (20 mg total) by mouth daily, Starting Fri 2/19/2021, Until Mon 5/10/2021, Normal      metFORMIN (GLUCOPHAGE) 1000 MG tablet Take 1 tablet (1,000 mg total) by mouth 2 (two) times a day with meals, Starting Mon 12/21/2020, Until Thu 12/16/2021, Normal      Milnacipran HCl (Savella) 100 MG TABS Take 1 tablet (100 mg total) by mouth daily, Starting Wed 12/30/2020, Normal      neomycin-polymyxin-hydrocortisone (CORTISPORIN) otic solution Administer 4 drops into both ears every 6 (six) hours, Starting Fri 10/2/2020, Normal      nitrofurantoin (MACRODANTIN) 50 mg capsule Take 1 capsule (50 mg total) by mouth daily at bedtime, Starting Fri 4/23/2021, Normal      pravastatin (PRAVACHOL) 10 mg tablet Take 1 tablet (10 mg total) by mouth daily at bedtime, Starting Tue 5/26/2020, Normal      ondansetron (ZOFRAN) 8 mg tablet Take 1 tablet (8 mg total) by mouth every 8 (eight) hours as needed for nausea or vomiting, Starting Wed 1/27/2021, Normal      oxyCODONE-acetaminophen (PERCOCET)  mg per tablet Take 1 tablet by mouth every 6 (six) hours as needed for severe painMax Daily Amount: 4 tablets, Starting Fri 4/23/2021, Normal      meclizine (ANTIVERT) 25 mg tablet Take 1 tablet (25 mg total) by mouth 4 (four) times a day as needed for dizziness, Starting Thu 10/22/2020, Until Tue 12/1/2020, Normal      oxybutynin (DITROPAN) 5 mg tablet Take 1 tablet (5 mg total) by mouth 3 (three) times a day, Starting Wed 12/30/2020, Until Mon 5/10/2021, Normal      pantoprazole (PROTONIX) 40 mg tablet Take 1 tablet (40 mg total) by mouth 2 (two) times daily after meals, Starting Fri 7/17/2020, Until Mon 5/10/2021, Normal         STOP taking these medications       cyclobenzaprine (FLEXERIL) 10 mg tablet Comments:   Reason for Stopping:         fentaNYL (DURAGESIC) 50 mcg/hr Comments:   Reason for Stopping:         hyoscyamine (ANASPAZ) 0 125 mg Comments:   Reason for Stopping:         IRON PO Comments:   Reason for Stopping:         phenazopyridine (PYRIDIUM) 200 mg tablet Comments:   Reason for Stopping:         promethazine (PHENERGAN) 25 mg tablet Comments:   Reason for Stopping:             Outpatient Discharge Orders   Discharge Diet     Activity as tolerated     Call provider for:  persistent nausea or vomiting     Call provider for:  severe uncontrolled pain     Call provider for:  difficulty breathing, headache or visual disturbances       PDMP Review       Value Time User    PDMP Reviewed  Yes 5/25/2021  7:20 AM Iris [address] DO          ED Provider  Electronically Signed by           Natanael Bell MD  05/26/21 7142

## 2021-05-11 PROBLEM — R10.31 RIGHT LOWER QUADRANT ABDOMINAL PAIN: Status: ACTIVE | Noted: 2021-05-11

## 2021-05-11 PROBLEM — K27.9 PUD (PEPTIC ULCER DISEASE): Status: ACTIVE | Noted: 2021-05-11

## 2021-05-11 PROBLEM — R19.7 DIARRHEA: Status: ACTIVE | Noted: 2021-05-11

## 2021-05-11 LAB
ALBUMIN SERPL BCP-MCNC: 2.8 G/DL (ref 3.5–5)
ALP SERPL-CCNC: 126 U/L (ref 46–116)
ALT SERPL W P-5'-P-CCNC: 17 U/L (ref 12–78)
ANION GAP SERPL CALCULATED.3IONS-SCNC: 6 MMOL/L (ref 4–13)
AST SERPL W P-5'-P-CCNC: 17 U/L (ref 5–45)
ATRIAL RATE: 99 BPM
BASOPHILS # BLD AUTO: 0.06 THOUSANDS/ΜL (ref 0–0.1)
BASOPHILS NFR BLD AUTO: 1 % (ref 0–1)
BILIRUB SERPL-MCNC: 0.49 MG/DL (ref 0.2–1)
BUN SERPL-MCNC: 13 MG/DL (ref 5–25)
CALCIUM ALBUM COR SERPL-MCNC: 9.7 MG/DL (ref 8.3–10.1)
CALCIUM SERPL-MCNC: 8.7 MG/DL (ref 8.3–10.1)
CHLORIDE SERPL-SCNC: 104 MMOL/L (ref 100–108)
CO2 SERPL-SCNC: 30 MMOL/L (ref 21–32)
CREAT SERPL-MCNC: 0.94 MG/DL (ref 0.6–1.3)
EOSINOPHIL # BLD AUTO: 0.2 THOUSAND/ΜL (ref 0–0.61)
EOSINOPHIL NFR BLD AUTO: 3 % (ref 0–6)
ERYTHROCYTE [DISTWIDTH] IN BLOOD BY AUTOMATED COUNT: 15.5 % (ref 11.6–15.1)
EST. AVERAGE GLUCOSE BLD GHB EST-MCNC: 163 MG/DL
GFR SERPL CREATININE-BSD FRML MDRD: 58 ML/MIN/1.73SQ M
GLUCOSE P FAST SERPL-MCNC: 153 MG/DL (ref 65–99)
GLUCOSE SERPL-MCNC: 153 MG/DL (ref 65–140)
HBA1C MFR BLD: 7.3 %
HCT VFR BLD AUTO: 33.5 % (ref 34.8–46.1)
HGB BLD-MCNC: 10.2 G/DL (ref 11.5–15.4)
IMM GRANULOCYTES # BLD AUTO: 0.01 THOUSAND/UL (ref 0–0.2)
IMM GRANULOCYTES NFR BLD AUTO: 0 % (ref 0–2)
LACTATE SERPL-SCNC: 0.9 MMOL/L (ref 0.5–2)
LYMPHOCYTES # BLD AUTO: 1.93 THOUSANDS/ΜL (ref 0.6–4.47)
LYMPHOCYTES NFR BLD AUTO: 28 % (ref 14–44)
MCH RBC QN AUTO: 26.2 PG (ref 26.8–34.3)
MCHC RBC AUTO-ENTMCNC: 30.4 G/DL (ref 31.4–37.4)
MCV RBC AUTO: 86 FL (ref 82–98)
MONOCYTES # BLD AUTO: 0.69 THOUSAND/ΜL (ref 0.17–1.22)
MONOCYTES NFR BLD AUTO: 10 % (ref 4–12)
NEUTROPHILS # BLD AUTO: 4.01 THOUSANDS/ΜL (ref 1.85–7.62)
NEUTS SEG NFR BLD AUTO: 58 % (ref 43–75)
NRBC BLD AUTO-RTO: 0 /100 WBCS
P AXIS: 77 DEGREES
PLATELET # BLD AUTO: 228 THOUSANDS/UL (ref 149–390)
PMV BLD AUTO: 10.2 FL (ref 8.9–12.7)
POTASSIUM SERPL-SCNC: 3.6 MMOL/L (ref 3.5–5.3)
PR INTERVAL: 144 MS
PROT SERPL-MCNC: 6.2 G/DL (ref 6.4–8.2)
QRS AXIS: -45 DEGREES
QRSD INTERVAL: 130 MS
QT INTERVAL: 404 MS
QTC INTERVAL: 518 MS
RBC # BLD AUTO: 3.9 MILLION/UL (ref 3.81–5.12)
SODIUM SERPL-SCNC: 140 MMOL/L (ref 136–145)
T WAVE AXIS: 103 DEGREES
VENTRICULAR RATE: 99 BPM
WBC # BLD AUTO: 6.9 THOUSAND/UL (ref 4.31–10.16)

## 2021-05-11 PROCEDURE — 99219 PR INITIAL OBSERVATION CARE/DAY 50 MINUTES: CPT | Performed by: STUDENT IN AN ORGANIZED HEALTH CARE EDUCATION/TRAINING PROGRAM

## 2021-05-11 PROCEDURE — 85025 COMPLETE CBC W/AUTO DIFF WBC: CPT | Performed by: NURSE PRACTITIONER

## 2021-05-11 PROCEDURE — 80053 COMPREHEN METABOLIC PANEL: CPT | Performed by: NURSE PRACTITIONER

## 2021-05-11 PROCEDURE — 93010 ELECTROCARDIOGRAM REPORT: CPT | Performed by: INTERNAL MEDICINE

## 2021-05-11 PROCEDURE — 36415 COLL VENOUS BLD VENIPUNCTURE: CPT | Performed by: NURSE PRACTITIONER

## 2021-05-11 PROCEDURE — 83605 ASSAY OF LACTIC ACID: CPT | Performed by: NURSE PRACTITIONER

## 2021-05-11 RX ORDER — METOCLOPRAMIDE 5 MG/1
5 TABLET ORAL 3 TIMES DAILY
Status: DISCONTINUED | OUTPATIENT
Start: 2021-05-11 | End: 2021-05-12 | Stop reason: HOSPADM

## 2021-05-11 RX ORDER — PRAVASTATIN SODIUM 10 MG
10 TABLET ORAL
Status: DISCONTINUED | OUTPATIENT
Start: 2021-05-11 | End: 2021-05-12 | Stop reason: HOSPADM

## 2021-05-11 RX ORDER — OXYCODONE HYDROCHLORIDE 5 MG/1
5 TABLET ORAL EVERY 6 HOURS PRN
Status: DISCONTINUED | OUTPATIENT
Start: 2021-05-11 | End: 2021-05-12 | Stop reason: HOSPADM

## 2021-05-11 RX ORDER — ACETAMINOPHEN 325 MG/1
650 TABLET ORAL EVERY 6 HOURS PRN
Status: DISCONTINUED | OUTPATIENT
Start: 2021-05-11 | End: 2021-05-12 | Stop reason: HOSPADM

## 2021-05-11 RX ORDER — LANOLIN ALCOHOL/MO/W.PET/CERES
3 CREAM (GRAM) TOPICAL
Status: DISCONTINUED | OUTPATIENT
Start: 2021-05-11 | End: 2021-05-12 | Stop reason: HOSPADM

## 2021-05-11 RX ORDER — GABAPENTIN 300 MG/1
600 CAPSULE ORAL
Status: DISCONTINUED | OUTPATIENT
Start: 2021-05-11 | End: 2021-05-12 | Stop reason: HOSPADM

## 2021-05-11 RX ORDER — SIMETHICONE 80 MG
80 TABLET,CHEWABLE ORAL ONCE
Status: COMPLETED | OUTPATIENT
Start: 2021-05-11 | End: 2021-05-11

## 2021-05-11 RX ORDER — BACLOFEN 10 MG/1
5 TABLET ORAL DAILY PRN
Status: DISCONTINUED | OUTPATIENT
Start: 2021-05-11 | End: 2021-05-12 | Stop reason: HOSPADM

## 2021-05-11 RX ORDER — HYDRALAZINE HYDROCHLORIDE 20 MG/ML
10 INJECTION INTRAMUSCULAR; INTRAVENOUS EVERY 8 HOURS PRN
Status: DISCONTINUED | OUTPATIENT
Start: 2021-05-11 | End: 2021-05-12 | Stop reason: HOSPADM

## 2021-05-11 RX ORDER — SIMETHICONE 80 MG
80 TABLET,CHEWABLE ORAL 4 TIMES DAILY PRN
Status: DISCONTINUED | OUTPATIENT
Start: 2021-05-11 | End: 2021-05-12 | Stop reason: HOSPADM

## 2021-05-11 RX ORDER — LEVOTHYROXINE SODIUM 0.07 MG/1
37.5 TABLET ORAL
Status: DISCONTINUED | OUTPATIENT
Start: 2021-05-11 | End: 2021-05-12 | Stop reason: HOSPADM

## 2021-05-11 RX ORDER — GABAPENTIN 300 MG/1
300 CAPSULE ORAL DAILY
Status: DISCONTINUED | OUTPATIENT
Start: 2021-05-11 | End: 2021-05-12 | Stop reason: HOSPADM

## 2021-05-11 RX ORDER — LATANOPROST 50 UG/ML
1 SOLUTION/ DROPS OPHTHALMIC
Status: DISCONTINUED | OUTPATIENT
Start: 2021-05-11 | End: 2021-05-12 | Stop reason: HOSPADM

## 2021-05-11 RX ORDER — SODIUM CHLORIDE, SODIUM LACTATE, POTASSIUM CHLORIDE, CALCIUM CHLORIDE 600; 310; 30; 20 MG/100ML; MG/100ML; MG/100ML; MG/100ML
75 INJECTION, SOLUTION INTRAVENOUS ONCE
Status: COMPLETED | OUTPATIENT
Start: 2021-05-11 | End: 2021-05-11

## 2021-05-11 RX ORDER — HYDROMORPHONE HCL/PF 1 MG/ML
0.2 SYRINGE (ML) INJECTION EVERY 6 HOURS PRN
Status: DISCONTINUED | OUTPATIENT
Start: 2021-05-11 | End: 2021-05-12 | Stop reason: HOSPADM

## 2021-05-11 RX ORDER — HEPARIN SODIUM 5000 [USP'U]/ML
5000 INJECTION, SOLUTION INTRAVENOUS; SUBCUTANEOUS EVERY 8 HOURS SCHEDULED
Status: DISCONTINUED | OUTPATIENT
Start: 2021-05-11 | End: 2021-05-12 | Stop reason: HOSPADM

## 2021-05-11 RX ORDER — MECLIZINE HYDROCHLORIDE 25 MG/1
25 TABLET ORAL 4 TIMES DAILY PRN
Status: DISCONTINUED | OUTPATIENT
Start: 2021-05-11 | End: 2021-05-12 | Stop reason: HOSPADM

## 2021-05-11 RX ORDER — PANTOPRAZOLE SODIUM 40 MG/1
40 TABLET, DELAYED RELEASE ORAL
Status: DISCONTINUED | OUTPATIENT
Start: 2021-05-11 | End: 2021-05-12 | Stop reason: HOSPADM

## 2021-05-11 RX ADMIN — METOCLOPRAMIDE 5 MG: 10 TABLET ORAL at 21:28

## 2021-05-11 RX ADMIN — FAMOTIDINE 20 MG: 20 TABLET, FILM COATED ORAL at 00:28

## 2021-05-11 RX ADMIN — HEPARIN SODIUM 5000 UNITS: 5000 INJECTION INTRAVENOUS; SUBCUTANEOUS at 01:31

## 2021-05-11 RX ADMIN — METOCLOPRAMIDE 5 MG: 10 TABLET ORAL at 09:44

## 2021-05-11 RX ADMIN — HEPARIN SODIUM 5000 UNITS: 5000 INJECTION INTRAVENOUS; SUBCUTANEOUS at 21:27

## 2021-05-11 RX ADMIN — GABAPENTIN 300 MG: 300 CAPSULE ORAL at 09:43

## 2021-05-11 RX ADMIN — PANTOPRAZOLE SODIUM 40 MG: 40 TABLET, DELAYED RELEASE ORAL at 16:39

## 2021-05-11 RX ADMIN — GABAPENTIN 600 MG: 300 CAPSULE ORAL at 21:27

## 2021-05-11 RX ADMIN — SIMETHICONE 80 MG: 80 TABLET, CHEWABLE ORAL at 01:30

## 2021-05-11 RX ADMIN — SODIUM CHLORIDE, SODIUM LACTATE, POTASSIUM CHLORIDE, AND CALCIUM CHLORIDE 75 ML/HR: .6; .31; .03; .02 INJECTION, SOLUTION INTRAVENOUS at 01:35

## 2021-05-11 RX ADMIN — LATANOPROST 1 DROP: 50 SOLUTION OPHTHALMIC at 21:36

## 2021-05-11 RX ADMIN — METOCLOPRAMIDE 5 MG: 10 TABLET ORAL at 16:39

## 2021-05-11 RX ADMIN — PANTOPRAZOLE SODIUM 40 MG: 40 TABLET, DELAYED RELEASE ORAL at 09:43

## 2021-05-11 RX ADMIN — PRAVASTATIN SODIUM 10 MG: 10 TABLET ORAL at 21:28

## 2021-05-11 RX ADMIN — HEPARIN SODIUM 5000 UNITS: 5000 INJECTION INTRAVENOUS; SUBCUTANEOUS at 05:15

## 2021-05-11 RX ADMIN — HEPARIN SODIUM 5000 UNITS: 5000 INJECTION INTRAVENOUS; SUBCUTANEOUS at 14:02

## 2021-05-11 RX ADMIN — PRAVASTATIN SODIUM 10 MG: 10 TABLET ORAL at 01:30

## 2021-05-11 RX ADMIN — LEVOTHYROXINE SODIUM 37.5 MCG: 75 TABLET ORAL at 05:14

## 2021-05-11 RX ADMIN — SUCRALFATE 1 G: 1 TABLET ORAL at 00:28

## 2021-05-11 RX ADMIN — LATANOPROST 1 DROP: 50 SOLUTION OPHTHALMIC at 01:31

## 2021-05-11 RX ADMIN — OXYCODONE HYDROCHLORIDE 5 MG: 5 TABLET ORAL at 21:31

## 2021-05-11 RX ADMIN — GABAPENTIN 600 MG: 300 CAPSULE ORAL at 01:30

## 2021-05-11 NOTE — PHYSICIAN ADVISOR
Current patient class: Observation  The patient is currently on Hospital Day: 2 at [facility]        The patient was admitted to the hospital  on N/A at N/A for the following diagnosis:  Abdominal pain [R10 9]  Poor feeding [R63 3]  Right sided abdominal pain [R10 9]     After review of the relevant documentation, labs, vital signs and test results, the patient is most appropriate for OBSERVATION STATUS  Rationale is as follows: The patient is a 64 yrs   Female who presented to the ED at 5/10/2021  4:49 PM with a chief complaint of Abdominal Pain (pt on abx for diverticulitis, reports symptoms are not improving )  Patient had a CT scan which did not show any acute findings  There is no known cause for the patient's abdominal pain at this time  It is improving with some pain medications  Patient is anticipated for discharge tomorrow per the history and physical examination  Given the unclear  Etiology and with no acute findings with the anticipated discharge tomorrow I am recommending keeping the patient observation status  If the patient needs any hospitalization beyond tomorrow then I would have a low threshold changing the patient to inpatient at that time      The patients vitals on arrival were   ED Triage Vitals   Temperature Pulse Respirations Blood Pressure SpO2   05/10/21 1408 05/10/21 1408 05/10/21 1408 05/10/21 1408 05/10/21 1408   99 °F (37 2 °C) 104 18 (!) 144/103 98 %      Temp Source Heart Rate Source Patient Position - Orthostatic VS BP Location FiO2 (%)   05/11/21 1011 05/10/21 1650 05/10/21 1650 05/10/21 1650 --   Oral Monitor Sitting Left arm       Pain Score       05/10/21 1408       8           Past Medical History:   Diagnosis Date    Acid reflux     Anxiety     Arthritis     Asthma     Basal cell carcinoma     upper lip    Chronic narcotic dependence (HCC)     Chronic pain     Colon polyp     Cystocele     Diabetes mellitus ([address] )     Diverticulosis     Dysfunctional uterine bleeding     last assessed - [id number]    Fibromyalgia     Gastric ulcer     Gastroparesis     History of colonic polyps     last assessed - [id number]    History of gastroesophageal reflux (GERD)     Hypercholesterolemia     Hyperlipidemia     Hypertension     IBS (irritable bowel syndrome)     Kidney stone     Post laminectomy syndrome     Seasonal allergies     Spinal stenosis      Past Surgical History:   Procedure Laterality Date    APPENDECTOMY      BACK SURGERY      BREAST CYST ASPIRATION Left     pt unsure what exactly was removed    CHOLECYSTECTOMY      ESOPHAGOGASTRODUODENOSCOPY N/A 9/28/2016    Procedure: ESOPHAGOGASTRODUODENOSCOPY (EGD); Surgeon: Robinson Mitchell MD;  [facility]; Service:     HERNIA REPAIR      HYSTERECTOMY      TTAH-BSO age 25   [facility] LAMINECTOMY      LUMBAR LAMINECTOMY      OOPHORECTOMY      age 25   [facility] MO ARTHRODESIS POSTERIOR/POSTEROLATERAL THORACIC N/A 6/4/2018    Procedure: Reopening of lumbar incision for T12-L5 posterior instrumented fixation and fusion and T12-L4 posterior decompression;  Surgeon: Reyes Green MD;  [facility];  Service: Neurosurgery    MO COLONOSCOPY FLX DX W/COLLJ SPEC WHEN PFRMD N/A 3/2/2016    Procedure: EGD AND COLONOSCOPY;  Surgeon: Robinson Mitchell MD;  [facility]; Service: Gastroenterology    MO DILATE ESOPHAGUS N/A 9/28/2016    Procedure: DILATATION ESOPHAGEAL;  Surgeon: Robinson Mitchell MD;  [facility]; Service: Gastroenterology    MO ESOPHAGOGASTRODUODENOSCOPY TRANSORAL DIAGNOSTIC N/A 7/18/2016    Procedure: ESOPHAGOGASTRODUODENOSCOPY (EGD); Surgeon: Robinson Mitchell MD;  [facility];   Service: Gastroenterology    MO IMPLANT SPINAL NEUROSTIM/ Left 6/4/2018    Procedure: removal of left buttock implantable pulse generator and placement of new  implantable pulse generator;  Surgeon: Reyes Green MD;  [facility];  Service: Neurosurgery Consults have been placed to:   None    Vitals:    05/11/21 0400 05/11/21 0600 05/11/21 1011 05/11/21 1031   BP: 164/84 164/74 136/66 150/70   BP Location: Right arm Right arm Right arm    Pulse: 78 78 89 87   Resp: 16 16 16 13   Temp:   97 5 °F (36 4 °C) 97 9 °F (36 6 °C)   TempSrc:   Oral    SpO2: 95% 95% 95% 96%   Weight:    84 6 kg (186 lb 8 2 oz)   Height:    5' (1 524 m)       Most recent labs:    Recent Labs     05/10/21  1414 05/11/21  0513   WBC 7 68 6 90   HGB 11 9 10 2*   HCT 38 8 33 5*    228   K 3 9 3 6   CALCIUM 9 5 8 7   BUN 13 13   CREATININE 1 08 0 94   LIPASE 67*  --    AST 21 17   ALT 21 17   ALKPHOS 157* 126*       Scheduled Meds:  Current Facility-Administered Medications   Medication Dose Route Frequency Provider Last Rate    acetaminophen  650 mg Oral Q6H PRN KITA GreenfieldNP      baclofen  5 mg Oral Daily PRN KITA GreenfieldNP      gabapentin  300 mg Oral Daily KITA GreenfieldNP      gabapentin  600 mg Oral HS NANY Greenfield      heparin (porcine)  5,000 Units Subcutaneous [facility] [address] [place] [address]      hydrALAZINE  10 mg Intravenous Q8H PRN NANY Greenfield      HYDROmorphone  0 2 mg Intravenous Q6H PRN NANY Greenfield      latanoprost  1 drop Both Eyes HS NANY Greenfield      levothyroxine  37 5 mcg Oral Early Morning NANY Greenfield      meclizine  25 mg Oral 4x Daily PRN NANY Greenfield      metoclopramide  5 mg Oral TID NANY Greenfield      Milnacipran HCl  1 tablet Oral Daily NANY Greenfield      oxyCODONE  5 mg Oral Q6H PRN NANY Greenfield      Or    oxyCODONE  5 mg Oral Q6H PRN NANY Greenfield      pantoprazole  40 mg Oral BID after meals NANY Greenfield      pravastatin  10 mg Oral HS NANY Greenfield      simethicone  80 mg Oral 4x Daily PRN NANY Greenfield       Continuous Infusions:   PRN Meds:   acetaminophen    baclofen    hydrALAZINE    HYDROmorphone    meclizine    oxyCODONE **OR** oxyCODONE    simethicone

## 2021-05-11 NOTE — UTILIZATION REVIEW
Initial Clinical Review    Admission: Date/Time/Statement: 5/10/2021 2319 Observation   Admission Orders (From admission, onward)     Ordered        05/10/21 2319  Place in Observation  Once                   Orders Placed This Encounter   Procedures    Place in Observation     Standing Status:   Standing     Number of Occurrences:   1     Order Specific Question:   Level of Care     Answer:   Med Surg [16]     ED Arrival Information     Expected Arrival Acuity Means of Arrival Escorted By Service Admission Type    - 5/10/2021 13:33 Urgent Walk-In Family Member General Medicine Urgent    Arrival Complaint    abdominal pain        Chief Complaint   Patient presents with    Abdominal Pain     pt on abx for diverticulitis, reports symptoms are not improving  Initial Presentation:  this is a 64year old female from home to ED admitted to observation due to RLQ pain/Diarrhea  Presented due to worsening RLQ abdominal pain starting about 2 weeks prior to arrival despite treatment for suspected diverticulitis with Levaquin/Flagyl, worse since their completion few days ago, as also with nausea, stool light in color  On exam reproducible tenderness RLQ  Tachycardia  Hypertensive  Ct abdomen without acute findings  Plan is clears, pain control  Monitor off antibiotics  Check stool and bacterial stool panel     On 5/11/2021 Observation:   Has persistent abdominal pain improving for 8/9 to 5/10  Nausea is improving  On exam abdomen  RLQ tenderness  Pain control in progress       ED Triage Vitals   Temperature Pulse Respirations Blood Pressure SpO2   05/10/21 1408 05/10/21 1408 05/10/21 1408 05/10/21 1408 05/10/21 1408   99 °F (37 2 °C) 104 18 (!) 144/103 98 %      Temp src Heart Rate Source Patient Position - Orthostatic VS BP Location FiO2 (%)   -- 05/10/21 1650 05/10/21 1650 05/10/21 1650 --    Monitor Sitting Left arm       Pain Score       05/10/21 1408       8          Wt Readings from Last 1 Encounters:   09/21/20 83 5 kg (184 lb)     Additional Vital Signs:   05/11/21 0600  --  78  16  164/74  107  95 %  None (Room air)  Lying   05/11/21 0400  --  78  16  164/84  115  95 %  None (Room air)  Lying   05/11/21 0200  --  78  16  163/74  106  95 %  None (Room air)  Lying   05/11/21 0137  --  83  16  169/80  --  97 %  None (Room air)  Lying   05/10/21 2357  --  86  18  183/86Abnormal   --  97 %  None (Room air)  Lying   05/10/21 2145  --  90  18  187/91Abnormal   --  98 %  None (Room air)  Lying   05/10/21 1945  --  96  16  177/93Abnormal   --  97 %  None (Room air)  Lying   05/10/21 1830  --  98  --  195/112Abnormal   147  74 %Abnormal   --         Pertinent Labs/Diagnostic Test Results:   5/10/2021 ECG - sinus  Complete LBBB  Normal ST and T    5/10/2021 ct abdomen - No acute findings in the abdomen and pelvis    Results from last 7 days   Lab Units 05/11/21  0513 05/10/21  1414   WBC Thousand/uL 6 90 7 68   HEMOGLOBIN g/dL 10 2* 11 9   HEMATOCRIT % 33 5* 38 8   PLATELETS Thousands/uL 228 252   NEUTROS ABS Thousands/µL 4 01 5 18     Results from last 7 days   Lab Units 05/11/21  0513 05/10/21  1414   SODIUM mmol/L 140 140   POTASSIUM mmol/L 3 6 3 9   CHLORIDE mmol/L 104 102   CO2 mmol/L 30 30   ANION GAP mmol/L 6 8   BUN mg/dL 13 13   CREATININE mg/dL 0 94 1 08   EGFR ml/min/1 73sq m 58 49   CALCIUM mg/dL 8 7 9 5     Results from last 7 days   Lab Units 05/11/21  0513 05/10/21  1414   AST U/L 17 21   ALT U/L 17 21   ALK PHOS U/L 126* 157*   TOTAL PROTEIN g/dL 6 2* 7 4   ALBUMIN g/dL 2 8* 3 4*   TOTAL BILIRUBIN mg/dL 0 49 0 40     Results from last 7 days   Lab Units 05/11/21  0513 05/10/21  1414   GLUCOSE RANDOM mg/dL 153* 168*     Results from last 7 days   Lab Units 05/10/21  1414   HEMOGLOBIN A1C % 7 3*   EAG mg/dl 163     Results from last 7 days   Lab Units 05/11/21  0028   LACTIC ACID mmol/L 0 9     Results from last 7 days   Lab Units 05/10/21  1414   LIPASE u/L 67*     Results from last 7 days Lab Units 05/10/21  2215   CLARITY UA  Clear   COLOR UA  Yellow   SPEC GRAV UA  >=1 030   PH UA  5 5   GLUCOSE UA mg/dl Negative   KETONES UA mg/dl Negative   BLOOD UA  Trace*   PROTEIN UA mg/dl Negative   NITRITE UA  Negative   BILIRUBIN UA  Negative   UROBILINOGEN UA E U /dl 0 2   LEUKOCYTES UA  Negative   WBC UA /hpf 0-1   RBC UA /hpf 2-4   BACTERIA UA /hpf Occasional   EPITHELIAL CELLS WET PREP /hpf Occasional     ED Treatment:   Medication Administration from 05/10/2021 1333 to 05/11/2021 0829       Date/Time Order Dose Route Action Comments     05/10/2021 1848 HYDROmorphone (DILAUDID) injection 0 5 mg 0 5 mg Intravenous Given      05/10/2021 1941 dicyclomine (BENTYL) tablet 20 mg 20 mg Oral Given      05/10/2021 1942 sodium chloride 0 9 % bolus 1,000 mL 1,000 mL Intravenous New Bag      05/10/2021 2228 HYDROmorphone (DILAUDID) injection 0 5 mg 0 5 mg Intravenous Given      05/11/2021 0028 famotidine (PEPCID) tablet 20 mg 20 mg Oral Given      05/11/2021 0028 sucralfate (CARAFATE) tablet 1 g 1 g Oral Given      05/11/2021 0130 pravastatin (PRAVACHOL) tablet 10 mg 10 mg Oral Given      05/11/2021 0514 levothyroxine tablet 37 5 mcg 37 5 mcg Oral Given      05/11/2021 0131 latanoprost (XALATAN) 0 005 % ophthalmic solution 1 drop 1 drop Both Eyes Given      05/11/2021 0130 gabapentin (NEURONTIN) capsule 600 mg 600 mg Oral Given      05/11/2021 0135 lactated ringers infusion 75 mL/hr Intravenous New Bag      05/11/2021 8001 simethicone (MYLICON) chewable tablet 80 mg 80 mg Oral Given      05/11/2021 0515 heparin (porcine) subcutaneous injection 5,000 Units 5,000 Units Subcutaneous Given      05/11/2021 0131 heparin (porcine) subcutaneous injection 5,000 Units 5,000 Units Subcutaneous Given         Past Medical History:   Diagnosis Date    Acid reflux     Anxiety     Arthritis     Asthma     Basal cell carcinoma     upper lip    Chronic narcotic dependence (HCC)     Chronic pain     Colon polyp     Cystocele     Diabetes mellitus ([facility] )     Diverticulosis     Dysfunctional uterine bleeding     last assessed - [id number]    Fibromyalgia     Gastric ulcer     Gastroparesis     History of colonic polyps     last assessed - [id number]    History of gastroesophageal reflux (GERD)     Hypercholesterolemia     Hyperlipidemia     Hypertension     IBS (irritable bowel syndrome)     Kidney stone     Post laminectomy syndrome     Seasonal allergies     Spinal stenosis      Present on Admission:   Degenerative lumbar spinal stenosis   Hypertension   Diabetes mellitus, type 2 ([facility] )      Admitting Diagnosis: Abdominal pain [R10 9]  Age/Sex: 64 y o  female  Admission Orders:  Scheduled Medications:  gabapentin, 300 mg, Oral, Daily  gabapentin, 600 mg, Oral, HS  heparin (porcine), 5,000 Units, Subcutaneous, Q8H ARY  latanoprost, 1 drop, Both Eyes, HS  levothyroxine, 37 5 mcg, Oral, Early Morning  metoclopramide, 5 mg, Oral, TID  Milnacipran HCl, 1 tablet, Oral, Daily  pantoprazole, 40 mg, Oral, BID after meals  pravastatin, 10 mg, Oral, HS    Continuous IV Infusions: none      PRN Meds: not used   acetaminophen, 650 mg, Oral, Q6H PRN  baclofen, 5 mg, Oral, Daily PRN  hydrALAZINE, 10 mg, Intravenous, Q8H PRN  HYDROmorphone, 0 2 mg, Intravenous, Q6H PRN  meclizine, 25 mg, Oral, 4x Daily PRN  oxyCODONE, 5 mg, Oral, Q6H PRN    Or  oxyCODONE, 5 mg, Oral, Q6H PRN  simethicone, 80 mg, Oral, 4x Daily PRN    Clears     Network Utilization Review Department  ATTENTION: Please call with any questions or concerns to [phone] and carefully listen to the prompts so that you are directed to the right person  All voicemails are confidential   Handy Villanueva all requests for admission clinical reviews, approved or denied determinations and any other requests to dedicated fax number below belonging to the campus where the patient is receiving treatment   List of dedicated fax numbers for the Facilities:  [facility] ADMISSION DENIALS (Administrative/Medical Necessity) [phone]   [address] (Maternity/NICU/Pediatrics) [address] [facility] [facility] [place] [address] [address] [address] [place] [address] Yoel Sheldon [address] [facility] [phone]

## 2021-05-11 NOTE — PLAN OF CARE
Problem: Potential for Falls  Goal: Patient will remain free of falls  Description: INTERVENTIONS:  - Assess patient frequently for physical needs  -  Identify cognitive and physical deficits and behaviors that affect risk of falls    -  [place] fall precautions as indicated by assessment   - Educate patient/family on patient safety including physical limitations  - Instruct patient to call for assistance with activity based on assessment  - Modify environment to reduce risk of injury  - Consider OT/PT consult to assist with strengthening/mobility  Outcome: Progressing

## 2021-05-11 NOTE — ASSESSMENT & PLAN NOTE
· Presentation: worsening RLQ abdominal pain with radiation to right side  Recently completed course of Levaquin and Flagyl for suspected diverticulitis  Pain worsens with PO intake  Also reporting bloating, loose/light colored stools  Denies bleeding  Some occasional nausea  · CT abdomen pelvis without contrast: diverticulosis without evidence of diverticulitis  Asymmetric soft tissue thickening at the right lower anterior abdominal wall, stable since at least 2017  · UA negative  · Lipase normal   · Check lactic   · Prior colonoscopy is not available for review  · Pain control  · Aqua K pad  · PRN tylenol, oxycodone, dilaudid IV  · Clear liquids, advance as tolerated   · Consider GI for worsening of symptoms  · Monitoring off abx  No SIRS criteria

## 2021-05-11 NOTE — PROGRESS NOTES
Patient stated she think she left her phone  in ED room  Contacted ED RN that gave report and said she checked everywhere and there was no   Will continue to look for    Nelda Celis RN

## 2021-05-11 NOTE — ASSESSMENT & PLAN NOTE
· BP elevated in setting of acute pain  · Continue lisinopril  · PRN hydralazine for SBP>180 when pain is well controlled

## 2021-05-11 NOTE — ASSESSMENT & PLAN NOTE
· S/p EGD 07/2020 in setting of coffee ground emesis  Negative for H pylori  Treated with PPI BID and Carafate QID  Per note, patient recommended for repeat EGD around Oct 2020  · Remains on protonix BID  · May follow as outpatient

## 2021-05-11 NOTE — ASSESSMENT & PLAN NOTE
Lab Results   Component Value Date    HGBA1C 6 9 (H) 01/12/2021       No results for input(s): POCGLU in the last 72 hours      Blood Sugar Average: Last 72 hrs:  · Hold Metformin while inpatient   · SSI, hypoglycemia protocol  · Update A1c

## 2021-05-11 NOTE — ASSESSMENT & PLAN NOTE
· Percocet  mg Q6 PRN, PDMP reviewed  · Substitute with oxycodone 5 mg q6 PRN moderate, oxycodone 10 mg q6 PRN severe as home regimen is nonformulary

## 2021-05-11 NOTE — H&P
Tank Carl 47 1945, 64 y o  female MRN: [id number]  Unit/Bed#: ED 16 Encounter: [id number]  Primary Care Provider: NANY Montesinos   Date and time admitted to hospital: 5/10/2021  4:49 PM    * Right lower quadrant abdominal pain  Assessment & Plan  · Presentation: worsening RLQ abdominal pain with radiation to right side  Recently completed course of Levaquin and Flagyl for suspected diverticulitis  Pain worsens with PO intake  Also reporting bloating, loose/light colored stools  Denies bleeding  Some occasional nausea  · CT abdomen pelvis without contrast: diverticulosis without evidence of diverticulitis  Asymmetric soft tissue thickening at the right lower anterior abdominal wall, stable since at least 2017  · UA negative  · Lipase normal   · Check lactic   · Prior colonoscopy is not available for review  · Pain control  · Aqua K pad  · PRN tylenol, oxycodone, dilaudid IV  · Clear liquids, advance as tolerated   · Consider GI for worsening of symptoms  · Monitoring off abx  No SIRS criteria  Diarrhea  Assessment & Plan  · POA   · Check cdiff and bacterial stool panel  PUD (peptic ulcer disease)  Assessment & Plan  · S/p EGD 07/2020 in setting of coffee ground emesis  Negative for H pylori  Treated with PPI BID and Carafate QID  Per note, patient recommended for repeat EGD around Oct 2020  · Remains on protonix BID  · May follow as outpatient  Hypertension  Assessment & Plan  · BP elevated in setting of acute pain  · Continue lisinopril  · PRN hydralazine for SBP>180 when pain is well controlled     Diabetes mellitus, type 2 (HCC)  Assessment & Plan  Lab Results   Component Value Date    HGBA1C 6 9 (H) 01/12/2021       No results for input(s): POCGLU in the last 72 hours      Blood Sugar Average: Last 72 hrs:  · Hold Metformin while inpatient   · SSI, hypoglycemia protocol  · Update A1c    Degenerative lumbar spinal stenosis  Assessment & Plan  · Percocet  mg Q6 PRN, PDMP reviewed  · Substitute with oxycodone 5 mg q6 PRN moderate, oxycodone 10 mg q6 PRN severe as home regimen is nonformulary  VTE Prophylaxis: Heparin  / reason for no mechanical VTE prophylaxis low risk   Code Status: level 1  POLST: POLST is not applicable to this patient  Discussion with family: patient and daughter Claudette Pares at bedside  Patient wishes Claudette Pares to be made primary contaact as she lives closest to the hospital      Anticipated Length of Stay:  Patient will be admitted on an Observation basis with an anticipated length of stay of  < 2 midnights  Justification for Hospital Stay: pain control     Total Time for Visit, including Counseling / Coordination of Care: 70 minutes  Greater than 50% of this total time spent on direct patient counseling and coordination of care  Chief Complaint:   Right lower quadrant abdominal pain    History of Present Illness: Neva Mcnulty is a 64 y o  female with pmh significant for diverticulosis, PUD 07/2020, diabetes, fibromyalgia, hypertension, spinal stenosis, appendectomy, cholecystectomy, hysterectomy  who presents with worsening right lower quadrant abdominal pain  Patient was recently treated for diveriticulitis  She completed course of levaquin and flagyl a few days ago but presents this evening for worsening pain  Reports pain worsens after PO intake  Has some radiation to her right side  Temp 99 on arrival to ED  She does not meet SIRS  She reports very loose stools that are light in color  She denies any bleeding  Has been having intermittent episodes of nausea  Patient is admitted as observation for pain control and serial abdominal exams  If no improvement or worsening, may consider GI  Review of Systems:    Review of Systems   Constitutional: Positive for appetite change and chills  Negative for fever     Gastrointestinal: Positive for abdominal pain, diarrhea and nausea  Negative for abdominal distention, anal bleeding, blood in stool, constipation, rectal pain and vomiting  Bloating    All other systems reviewed and are negative  Past Medical and Surgical History:     Past Medical History:   Diagnosis Date    Acid reflux     Anxiety     Arthritis     Asthma     Basal cell carcinoma     upper lip    Chronic narcotic dependence (HCC)     Chronic pain     Colon polyp     Cystocele     Diabetes mellitus ([facility] [address] )     Diverticulosis     Dysfunctional uterine bleeding     last assessed - [id number]    Fibromyalgia     Gastric ulcer     Gastroparesis     History of colonic polyps     last assessed - [id number]    History of gastroesophageal reflux (GERD)     Hypercholesterolemia     Hyperlipidemia     Hypertension     IBS (irritable bowel syndrome)     Kidney stone     Post laminectomy syndrome     Seasonal allergies     Spinal stenosis        Past Surgical History:   Procedure Laterality Date    APPENDECTOMY      BACK SURGERY      BREAST CYST ASPIRATION Left     pt unsure what exactly was removed    CHOLECYSTECTOMY      ESOPHAGOGASTRODUODENOSCOPY N/A 9/28/2016    Procedure: ESOPHAGOGASTRODUODENOSCOPY (EGD); Surgeon: Lior Mason MD;  [facility]; Service:     HERNIA REPAIR      HYSTERECTOMY      TTAH-BSO age 25   [place] LAMINECTOMY      LUMBAR LAMINECTOMY      OOPHORECTOMY      age 25   [place] ARTHRODESIS POSTERIOR/POSTEROLATERAL THORACIC N/A 6/4/2018    Procedure: Reopening of lumbar incision for T12-L5 posterior instrumented fixation and fusion and T12-L4 posterior decompression;  Surgeon: Alessandra Guy MD;  [facility];  Service: Neurosurgery    UT COLONOSCOPY FLX DX W/COLLJ SPEC WHEN PFRMD N/A 3/2/2016    Procedure: EGD AND COLONOSCOPY;  Surgeon: Lior Mason MD;  [facility];   Service: Gastroenterology    UT DILATE ESOPHAGUS N/A 9/28/2016    Procedure: DILATATION ESOPHAGEAL;  Surgeon: Lior Mason MD; [facility]; Service: Gastroenterology    MA ESOPHAGOGASTRODUODENOSCOPY TRANSORAL DIAGNOSTIC N/A 7/18/2016    Procedure: ESOPHAGOGASTRODUODENOSCOPY (EGD); Surgeon: Cecilia Sutherland MD;  [facility]; Service: Gastroenterology    MA IMPLANT SPINAL NEUROSTIM/ Left 6/4/2018    Procedure: removal of left buttock implantable pulse generator and placement of new  implantable pulse generator;  Surgeon: Tigist Guevara MD;  [facility];  Service: Neurosurgery       Meds/Allergies:    Prior to Admission medications    Medication Sig Start Date End Date Taking?  Authorizing Provider   baclofen 10 mg tablet Take 1 tablet (10 mg total) by mouth 3 (three) times a day as needed for muscle spasms 3/31/21 5/10/21 Yes Campbell Baires DO   gabapentin (NEURONTIN) 300 mg capsule 1 in am and 2 hs 4/14/21  Yes NANY Morgan   hyoscyamine (ANASPAZ) 0 125 mg Take 1 tablet (0 125 mg total) by mouth every 4 (four) hours as needed (spasms) for up to 10 days 10/23/19 9/21/29 Yes [place] Baires DO   IRON PO Take 65 mg by mouth 2 (two) times a day     Yes Historical Provider, MD   latanoprost (XALATAN) 0 005 % ophthalmic solution Administer 1 drop to both eyes daily at bedtime 10/2/20 5/10/21 Yes Campbell Baires DO   levothyroxine 25 mcg tablet Take 1 5 tablets (37 5 mcg total) by mouth daily 12/30/20 5/10/21 Yes [place] Baires DO   lisinopril (ZESTRIL) 20 mg tablet Take 1 tablet (20 mg total) by mouth daily 2/19/21 5/10/21 Yes [place] Baires DO   metFORMIN (GLUCOPHAGE) 1000 MG tablet Take 1 tablet (1,000 mg total) by mouth 2 (two) times a day with meals 12/21/20 12/16/21 Yes [place] Baires DO   metoclopramide (REGLAN) 5 mg tablet Take 1 tablet (5 mg total) by mouth 3 (three) times a day 6/16/20  Yes NANY Morgan   Milnacipran HCl (Savella) 100 MG TABS Take 1 tablet (100 mg total) by mouth daily 12/30/20  Yes Ailyn Huerta nitrofurantoin (MACRODANTIN) 50 mg capsule Take 1 capsule (50 mg total) by mouth daily at bedtime 4/23/21  Yes NANY Sarmiento   oxybutynin (DITROPAN) 5 mg tablet Take 1 tablet (5 mg total) by mouth 3 (three) times a day 12/30/20 5/10/21 Yes Micah Baires DO   oxyCODONE-acetaminophen (PERCOCET)  mg per tablet Take 1 tablet by mouth every 6 (six) hours as needed for severe painMax Daily Amount: 4 tablets 4/23/21  Yes NANY Sarmiento   pantoprazole (PROTONIX) 40 mg tablet Take 1 tablet (40 mg total) by mouth 2 (two) times daily after meals 7/17/20 5/10/21 Yes Jimmy Loco MD   pravastatin (PRAVACHOL) 10 mg tablet Take 1 tablet (10 mg total) by mouth daily at bedtime 5/26/20  Yes Micah Baires DO   albuterol (PROVENTIL HFA,VENTOLIN HFA) 90 mcg/act inhaler Inhale 2 puffs every 6 (six) hours as needed for wheezing or shortness of breath 5/11/20   Micah Baires DO   cyclobenzaprine (FLEXERIL) 10 mg tablet Take 1 tablet (10 mg total) by mouth daily at bedtime  Patient not taking: Reported on 5/10/2021 7/1/20 9/21/29  Micah Baires DO   fentaNYL (DURAGESIC) 50 mcg/hr Place 1 patch on the skin every third dayMax Daily Amount: 1 patch  Patient not taking: Reported on 5/10/2021 2/26/21   NANY Sarmiento   glucose blood test strip Bid testing 2/27/19   Micah Baires DO   meclizine (ANTIVERT) 25 mg tablet Take 1 tablet (25 mg total) by mouth 4 (four) times a day as needed for dizziness 10/22/20 12/1/20  Micah Baires DO   neomycin-polymyxin-hydrocortisone (CORTISPORIN) otic solution Administer 4 drops into both ears every 6 (six) hours  Patient not taking: Reported on 5/10/2021 10/2/20   Micah Remi Broadt, DO   ondansetron (ZOFRAN) 8 mg tablet Take 1 tablet (8 mg total) by mouth every 8 (eight) hours as needed for nausea or vomiting  Patient not taking: Reported on 5/10/2021 1/27/21   Micah Baires, DO   phenazopyridine (PYRIDIUM) 200 mg tablet Take 1 tablet (200 mg total) by mouth 3 (three) times a day with meals  Patient not taking: Reported on 5/10/2021 3/23/21   Zac Baires DO   promethazine (PHENERGAN) 25 mg tablet Take 1 tablet (25 mg total) by mouth every 6 (six) hours as needed for nausea or vomiting  Patient not taking: Reported on 5/10/2021 6/22/20   [address], DO     I have reviewed home medications with patient personally  Allergies: Allergies   Allergen Reactions    Penicillins Anaphylaxis and Other (See Comments)     Other reaction(s): Unknown Reaction    Sulfa Antibiotics Anaphylaxis and Other (See Comments)     Other reaction(s): Unknown Reaction    Aspartame - Food Allergy Other (See Comments) and Hypertension     Slurred speech, weakness, stroke sx    Iodinated Diagnostic Agents Hives     Pt has taken prep prior for contrast and has not had any break through reaction    Keflex [Cephalexin]        Social History:     Marital Status:      Substance Use History:   Social History     Substance and Sexual Activity   Alcohol Use No    Comment: Denied history of alcohol use     Social History     Tobacco Use   Smoking Status Former Smoker    Quit date: 1970    Years since quittin 3   Smokeless Tobacco Never Used   Tobacco Comment    Denied history of current ever day smoker, Former smoker and Never smoker all documented in [employer]     Social History     Substance and Sexual Activity   Drug Use No    Comment: Denied history of drug use       Family History:    non-contributory    Physical Exam:     Vitals:   Blood Pressure: (!) 183/86 (05/10/21 2357)  Pulse: 86 (05/10/21 2357)  Temperature: 99 °F (37 2 °C) (05/10/21 1408)  Respirations: 18 (05/10/21 2357)  SpO2: 97 % (05/10/21 235)    Physical Exam  Constitutional:       General: She is not in acute distress  Appearance: Normal appearance  She is obese  She is not ill-appearing     HENT:      Head: Normocephalic and atraumatic  Right Ear: External ear normal       Left Ear: External ear normal       Nose: Nose normal       Mouth/Throat:      Mouth: Mucous membranes are dry  Pharynx: Oropharynx is clear  Eyes:      Extraocular Movements: Extraocular movements intact  Conjunctiva/sclera: Conjunctivae normal    Neck:      Musculoskeletal: Normal range of motion and neck supple  Cardiovascular:      Rate and Rhythm: Normal rate and regular rhythm  Pulses: Normal pulses  Heart sounds: Normal heart sounds  Pulmonary:      Effort: Pulmonary effort is normal       Breath sounds: Normal breath sounds  Abdominal:      General: Bowel sounds are normal  There is no distension  Palpations: Abdomen is soft  Tenderness: There is abdominal tenderness (RLQ tenderness to palpation)  There is no right CVA tenderness, left CVA tenderness, guarding or rebound  Musculoskeletal: Normal range of motion  General: No tenderness  Right lower leg: No edema  Left lower leg: No edema  Skin:     General: Skin is warm and dry  Capillary Refill: Capillary refill takes less than 2 seconds  Neurological:      General: No focal deficit present  Mental Status: She is oriented to person, place, and time  Psychiatric:         Mood and Affect: Mood normal          Behavior: Behavior normal              Additional Data:     Lab Results: I have personally reviewed pertinent reports        Results from last 7 days   Lab Units 05/10/21  1414   WBC Thousand/uL 7 68   HEMOGLOBIN g/dL 11 9   HEMATOCRIT % 38 8   PLATELETS Thousands/uL 252   NEUTROS PCT % 68   LYMPHS PCT % 20   MONOS PCT % 8   EOS PCT % 3     Results from last 7 days   Lab Units 05/10/21  1414   SODIUM mmol/L 140   POTASSIUM mmol/L 3 9   CHLORIDE mmol/L 102   CO2 mmol/L 30   BUN mg/dL 13   CREATININE mg/dL 1 08   ANION GAP mmol/L 8   CALCIUM mg/dL 9 5   ALBUMIN g/dL 3 4*   TOTAL BILIRUBIN mg/dL 0 40   ALK PHOS U/L 157*   ALT U/L 21   AST U/L 21   GLUCOSE RANDOM mg/dL 168*                       Imaging: I have personally reviewed pertinent reports  CT abdomen pelvis wo contrast   Final Result by Sasha Butts MD (05/10 1849)   Limited exam without IV and oral contrast particularly for soft tissue and bowel evaluation  1  No acute findings in the abdomen and pelvis  Workstation performed: [id number]             EKG, Pathology, and Other Studies Reviewed on Admission:   EKG: NSR, PVCs, LBBB, left axis deviation  Similar to prior EKGs  [employer]scripts / Epic Records Reviewed: Yes     ** Please Note: This note has been constructed using a voice recognition system   **

## 2021-05-12 VITALS
WEIGHT: 186.51 LBS | SYSTOLIC BLOOD PRESSURE: 169 MMHG | OXYGEN SATURATION: 98 % | HEART RATE: 98 BPM | TEMPERATURE: 97.4 F | RESPIRATION RATE: 17 BRPM | DIASTOLIC BLOOD PRESSURE: 76 MMHG | HEIGHT: 60 IN | BODY MASS INDEX: 36.62 KG/M2

## 2021-05-12 PROBLEM — R19.7 DIARRHEA: Status: RESOLVED | Noted: 2021-05-11 | Resolved: 2021-05-12

## 2021-05-12 LAB
ALBUMIN SERPL BCP-MCNC: 2.8 G/DL (ref 3.5–5)
ALP SERPL-CCNC: 122 U/L (ref 46–116)
ALT SERPL W P-5'-P-CCNC: 14 U/L (ref 12–78)
ANION GAP SERPL CALCULATED.3IONS-SCNC: 8 MMOL/L (ref 4–13)
AST SERPL W P-5'-P-CCNC: 18 U/L (ref 5–45)
BASOPHILS # BLD AUTO: 0.06 THOUSANDS/ΜL (ref 0–0.1)
BASOPHILS NFR BLD AUTO: 1 % (ref 0–1)
BILIRUB SERPL-MCNC: 0.47 MG/DL (ref 0.2–1)
BUN SERPL-MCNC: 11 MG/DL (ref 5–25)
CALCIUM ALBUM COR SERPL-MCNC: 10 MG/DL (ref 8.3–10.1)
CALCIUM SERPL-MCNC: 9 MG/DL (ref 8.3–10.1)
CHLORIDE SERPL-SCNC: 106 MMOL/L (ref 100–108)
CO2 SERPL-SCNC: 28 MMOL/L (ref 21–32)
CREAT SERPL-MCNC: 0.96 MG/DL (ref 0.6–1.3)
EOSINOPHIL # BLD AUTO: 0.32 THOUSAND/ΜL (ref 0–0.61)
EOSINOPHIL NFR BLD AUTO: 6 % (ref 0–6)
ERYTHROCYTE [DISTWIDTH] IN BLOOD BY AUTOMATED COUNT: 14.9 % (ref 11.6–15.1)
GFR SERPL CREATININE-BSD FRML MDRD: 57 ML/MIN/1.73SQ M
GLUCOSE P FAST SERPL-MCNC: 141 MG/DL (ref 65–99)
GLUCOSE SERPL-MCNC: 141 MG/DL (ref 65–140)
HCT VFR BLD AUTO: 35.3 % (ref 34.8–46.1)
HGB BLD-MCNC: 10.7 G/DL (ref 11.5–15.4)
IMM GRANULOCYTES # BLD AUTO: 0.02 THOUSAND/UL (ref 0–0.2)
IMM GRANULOCYTES NFR BLD AUTO: 0 % (ref 0–2)
LYMPHOCYTES # BLD AUTO: 1.47 THOUSANDS/ΜL (ref 0.6–4.47)
LYMPHOCYTES NFR BLD AUTO: 26 % (ref 14–44)
MCH RBC QN AUTO: 25.8 PG (ref 26.8–34.3)
MCHC RBC AUTO-ENTMCNC: 30.3 G/DL (ref 31.4–37.4)
MCV RBC AUTO: 85 FL (ref 82–98)
MONOCYTES # BLD AUTO: 0.51 THOUSAND/ΜL (ref 0.17–1.22)
MONOCYTES NFR BLD AUTO: 9 % (ref 4–12)
NEUTROPHILS # BLD AUTO: 3.22 THOUSANDS/ΜL (ref 1.85–7.62)
NEUTS SEG NFR BLD AUTO: 58 % (ref 43–75)
NRBC BLD AUTO-RTO: 0 /100 WBCS
PLATELET # BLD AUTO: 244 THOUSANDS/UL (ref 149–390)
PMV BLD AUTO: 10.5 FL (ref 8.9–12.7)
POTASSIUM SERPL-SCNC: 3.8 MMOL/L (ref 3.5–5.3)
PROT SERPL-MCNC: 6.2 G/DL (ref 6.4–8.2)
RBC # BLD AUTO: 4.14 MILLION/UL (ref 3.81–5.12)
SODIUM SERPL-SCNC: 142 MMOL/L (ref 136–145)
WBC # BLD AUTO: 5.6 THOUSAND/UL (ref 4.31–10.16)

## 2021-05-12 PROCEDURE — 99217 PR OBSERVATION CARE DISCHARGE MANAGEMENT: CPT | Performed by: NURSE PRACTITIONER

## 2021-05-12 PROCEDURE — 85025 COMPLETE CBC W/AUTO DIFF WBC: CPT | Performed by: STUDENT IN AN ORGANIZED HEALTH CARE EDUCATION/TRAINING PROGRAM

## 2021-05-12 PROCEDURE — 80053 COMPREHEN METABOLIC PANEL: CPT | Performed by: STUDENT IN AN ORGANIZED HEALTH CARE EDUCATION/TRAINING PROGRAM

## 2021-05-12 RX ORDER — LISINOPRIL 20 MG/1
20 TABLET ORAL DAILY
Status: DISCONTINUED | OUTPATIENT
Start: 2021-05-12 | End: 2021-05-12 | Stop reason: HOSPADM

## 2021-05-12 RX ORDER — METOCLOPRAMIDE 5 MG/1
5 TABLET ORAL 3 TIMES DAILY
Qty: 30 TABLET | Refills: 0 | Status: SHIPPED | OUTPATIENT
Start: 2021-05-12 | End: 2021-06-04

## 2021-05-12 RX ORDER — SIMETHICONE 80 MG
80 TABLET,CHEWABLE ORAL 4 TIMES DAILY PRN
Qty: 20 TABLET | Refills: 0 | Status: SHIPPED | OUTPATIENT
Start: 2021-05-12 | End: 2021-07-22

## 2021-05-12 RX ADMIN — LEVOTHYROXINE SODIUM 37.5 MCG: 75 TABLET ORAL at 06:31

## 2021-05-12 RX ADMIN — GABAPENTIN 300 MG: 300 CAPSULE ORAL at 09:03

## 2021-05-12 RX ADMIN — METOCLOPRAMIDE 5 MG: 10 TABLET ORAL at 09:03

## 2021-05-12 RX ADMIN — SIMETHICONE 80 MG: 80 TABLET, CHEWABLE ORAL at 11:46

## 2021-05-12 RX ADMIN — HEPARIN SODIUM 5000 UNITS: 5000 INJECTION INTRAVENOUS; SUBCUTANEOUS at 06:31

## 2021-05-12 RX ADMIN — OXYCODONE HYDROCHLORIDE 5 MG: 5 TABLET ORAL at 07:26

## 2021-05-12 RX ADMIN — PANTOPRAZOLE SODIUM 40 MG: 40 TABLET, DELAYED RELEASE ORAL at 09:03

## 2021-05-12 RX ADMIN — HEPARIN SODIUM 5000 UNITS: 5000 INJECTION INTRAVENOUS; SUBCUTANEOUS at 13:39

## 2021-05-12 RX ADMIN — LISINOPRIL 20 MG: 20 TABLET ORAL at 09:03

## 2021-05-12 NOTE — ASSESSMENT & PLAN NOTE
· Presentation: worsening RLQ abdominal pain with radiation to right side  Recently completed course of Levaquin and Flagyl for suspected diverticulitis  Pain worsens with PO intake  Also reporting bloating, loose/light colored stools  Denies bleeding  Some occasional nausea  · Unclear etiology, suspected remnants from improving recent diverticulitis  · CT abdomen pelvis without contrast: diverticulosis without evidence of diverticulitis  Asymmetric soft tissue thickening at the right lower anterior abdominal wall, stable since at least 2017  · UA negative  · Lipase normal   · Lactic normal  · Prior colonoscopy is not available for review    · Currently feels much improved  · Tolerating clear liquid diet  · Requested diet advancement for lunch with hopes of discharge   · Patient tolerated lunch without nausea, vomiting, diarrhea or pain   · Reports improvement in abdominal bloating with Simethicone x1   · Will discharge home with PPI BID and Simethicone PRN  · Remain well hydrated and follow a lite/ bland diet

## 2021-05-12 NOTE — ASSESSMENT & PLAN NOTE
Lab Results   Component Value Date    HGBA1C 7 3 (H) 05/10/2021       No results for input(s): POCGLU in the last 72 hours      Blood Sugar Average: Last 72 hrs:  · Resume Metformin on discharge  · A1c 7 3

## 2021-05-12 NOTE — PLAN OF CARE
Problem: Potential for Falls  Goal: Patient will remain free of falls  Description: INTERVENTIONS:  - Assess patient frequently for physical needs  -  Identify cognitive and physical deficits and behaviors that affect risk of falls    -  [place] fall precautions as indicated by assessment   - Educate patient/family on patient safety including physical limitations  - Instruct patient to call for assistance with activity based on assessment  - Modify environment to reduce risk of injury  - Consider OT/PT consult to assist with strengthening/mobility  5/12/2021 0629 by Rita Villeda  Outcome: Progressing  5/12/2021 0629 by Rita Villeda  Outcome: Progressing     Problem: PAIN - ADULT  Goal: Verbalizes/displays adequate comfort level or baseline comfort level  Description: Interventions:  - Encourage patient to monitor pain and request assistance  - Assess pain using appropriate pain scale  - Administer analgesics based on type and severity of pain and evaluate response  - Implement non-pharmacological measures as appropriate and evaluate response  - Consider cultural and social influences on pain and pain management  - Notify physician/advanced practitioner if interventions unsuccessful or patient reports new pain  Outcome: Progressing     Problem: INFECTION - ADULT  Goal: Absence or prevention of progression during hospitalization  Description: INTERVENTIONS:  - Assess and monitor for signs and symptoms of infection  - Monitor lab/diagnostic results  - Monitor all insertion sites, i e  indwelling lines, tubes, and drains  - Monitor endotracheal if appropriate and nasal secretions for changes in amount and color  - [place] appropriate cooling/warming therapies per order  - Administer medications as ordered  - Instruct and encourage patient and family to use good hand hygiene technique  - Identify and instruct in appropriate isolation precautions for identified infection/condition  Outcome: Progressing Problem: SAFETY ADULT  Goal: Patient will remain free of falls  Description: INTERVENTIONS:  - Assess patient frequently for physical needs  -  Identify cognitive and physical deficits and behaviors that affect risk of falls    -  [place] fall precautions as indicated by assessment   - Educate patient/family on patient safety including physical limitations  - Instruct patient to call for assistance with activity based on assessment  - Modify environment to reduce risk of injury  - Consider OT/PT consult to assist with strengthening/mobility  5/12/2021 0629 by Alex Navas  Outcome: Progressing  5/12/2021 0629 by Alex Navas  Outcome: Progressing  Goal: Maintain or return to baseline ADL function  Description: INTERVENTIONS:  -  Assess patient's ability to carry out ADLs; assess patient's baseline for ADL function and identify physical deficits which impact ability to perform ADLs (bathing, care of mouth/teeth, toileting, grooming, dressing, etc )  - Assess/evaluate cause of self-care deficits   - Assess range of motion  - Assess patient's mobility; develop plan if impaired  - Assess patient's need for assistive devices and provide as appropriate  - Encourage maximum independence but intervene and supervise when necessary  - Involve family in performance of ADLs  - Assess for home care needs following discharge   - Consider OT consult to assist with ADL evaluation and planning for discharge  - Provide patient education as appropriate  Outcome: Progressing  Goal: Maintain or return mobility status to optimal level  Description: INTERVENTIONS:  - Assess patient's baseline mobility status (ambulation, transfers, stairs, etc )    - Identify cognitive and physical deficits and behaviors that affect mobility  - Identify mobility aids required to assist with transfers and/or ambulation (gait belt, sit-to-stand, lift, walker, cane, etc )  - [place] fall precautions as indicated by assessment  - Record patient progress and toleration of activity level on Mobility SBAR; progress patient to next Phase/Stage  - Instruct patient to call for assistance with activity based on assessment  - Consider rehabilitation consult to assist with strengthening/weightbearing, etc   Outcome: Progressing     Problem: DISCHARGE PLANNING  Goal: Discharge to home or other facility with appropriate resources  Description: INTERVENTIONS:  - Identify barriers to discharge w/patient and caregiver  - Arrange for needed discharge resources and transportation as appropriate  - Identify discharge learning needs (meds, wound care, etc )  - Arrange for interpretive services to assist at discharge as needed  - Refer to Case Management Department for coordinating discharge planning if the patient needs post-hospital services based on physician/advanced practitioner order or complex needs related to functional status, cognitive ability, or social support system  Outcome: Progressing     Problem: Knowledge Deficit  Goal: Patient/family/caregiver demonstrates understanding of disease process, treatment plan, medications, and discharge instructions  Description: Complete learning assessment and assess knowledge base    Interventions:  - Provide teaching at level of understanding  - Provide teaching via preferred learning methods  Outcome: Progressing

## 2021-05-12 NOTE — DISCHARGE INSTR - AVS FIRST PAGE
Abdominal bloating/ gas:  Try Simethicone every 6 hours as needed  Follow a soft, lite, bland diet and remain well hydrated  Follow-up with your PCP

## 2021-05-12 NOTE — ASSESSMENT & PLAN NOTE
· Percocet  mg Q6 PRN, PDMP reviewed  · Substituted with oxycodone 5 mg q6 PRN moderate, oxycodone 10 mg q6 PRN severe as home regimen is nonformulary

## 2021-05-12 NOTE — UTILIZATION REVIEW
Continued Stay Review  Admission: Date/Time/Statement: 5/10/2021 2319 Observation     Date:  5/12/2021                          Current Patient Class: inpatient  Current Level of Care:  Med surg     HPI:76 y o  female initially admitted on  5/10/2021 to observation due to RLQ pain/Diarrhea  Presented due to worsening RLQ abdominal pain starting about 2 weeks prior to arrival despite treatment for suspected diverticulitis with Levaquin/Flagyl, worse since their completion few days ago, as also with nausea, stool light in color  On exam reproducible tenderness RLQ  Tachycardia  Hypertensive  Ct abdomen without acute findings  Plan is clears, pain control  Monitor off antibiotics  Check stool and bacterial stool panel      On 5/11/2021 Observation:   Has persistent abdominal pain improving for 8/9 to 5/10  Nausea is improving  On exam abdomen  RLQ tenderness  Pain control in progress  Assessment/Plan:  5/12/2021:  Patient remains in observation  Oxycodone used for pain control  Stool C diff and enteric bacterial panel  Needs to be collected  Vital Signs:   05/11/21 2229  --  --  --  145/60  --  --  --  --   05/11/21 2211  97 8 °F (36 6 °C)  81  16  199/89Abnormal   128  97 %  None (Room air)  Sitting   05/11/21 1529  97 5 °F (36 4 °C)  73  16  142/70  94  95 %  None (Room air)  Sitting   05/11/21 10:31:49  97 9 °F (36 6 °C)  87  13  150/70  97  96 %  --  --   05/11/21 1011  97 5 °F (36 4 °C)  89  16  136/66  --  95 %  None (Room air)  Sitting   05/11/21 0600  --  78  16  164/74  107  95 %  None (Room air)         Pertinent Labs/Diagnostic Results:   5/10/2021 ECG - sinus  Complete LBBB  Normal ST and T     5/10/2021 ct abdomen - No acute findings in the abdomen and pelvis    Results from last 7 days   Lab Units 05/12/21  0551 05/11/21  0513 05/10/21  1414   WBC Thousand/uL 5 60 6 90 7 68   HEMOGLOBIN g/dL 10 7* 10 2* 11 9   HEMATOCRIT % 35 3 33 5* 38 8   PLATELETS Thousands/uL 244 228 252 NEUTROS ABS Thousands/µL 3 22 4 01 5 18     Results from last 7 days   Lab Units 05/12/21  0551 05/11/21  0513 05/10/21  1414   SODIUM mmol/L 142 140 140   POTASSIUM mmol/L 3 8 3 6 3 9   CHLORIDE mmol/L 106 104 102   CO2 mmol/L 28 30 30   ANION GAP mmol/L 8 6 8   BUN mg/dL 11 13 13   CREATININE mg/dL 0 96 0 94 1 08   EGFR ml/min/1 73sq m 57 58 49   CALCIUM mg/dL 9 0 8 7 9 5     Results from last 7 days   Lab Units 05/12/21  0551 05/11/21  0513 05/10/21  1414   AST U/L 18 17 21   ALT U/L 14 17 21   ALK PHOS U/L 122* 126* 157*   TOTAL PROTEIN g/dL 6 2* 6 2* 7 4   ALBUMIN g/dL 2 8* 2 8* 3 4*   TOTAL BILIRUBIN mg/dL 0 47 0 49 0 40     Results from last 7 days   Lab Units 05/12/21  0551 05/11/21  0513 05/10/21  1414   GLUCOSE RANDOM mg/dL 141* 153* 168*     Results from last 7 days   Lab Units 05/10/21  1414   HEMOGLOBIN A1C % 7 3*   EAG mg/dl 163     Results from last 7 days   Lab Units 05/11/21  0028   LACTIC ACID mmol/L 0 9     Results from last 7 days   Lab Units 05/10/21  1414   LIPASE u/L 67*     Results from last 7 days   Lab Units 05/10/21  2215   CLARITY UA  Clear   COLOR UA  Yellow   SPEC GRAV UA  >=1 030   PH UA  5 5   GLUCOSE UA mg/dl Negative   KETONES UA mg/dl Negative   BLOOD UA  Trace*   PROTEIN UA mg/dl Negative   NITRITE UA  Negative   BILIRUBIN UA  Negative   UROBILINOGEN UA E U /dl 0 2   LEUKOCYTES UA  Negative   WBC UA /hpf 0-1   RBC UA /hpf 2-4   BACTERIA UA /hpf Occasional   EPITHELIAL CELLS WET PREP /hpf Occasional       Medications:   Scheduled Medications:  gabapentin, 300 mg, Oral, Daily  gabapentin, 600 mg, Oral, HS  heparin (porcine), 5,000 Units, Subcutaneous, Q8H ARY  latanoprost, 1 drop, Both Eyes, HS  levothyroxine, 37 5 mcg, Oral, Early Morning  metoclopramide, 5 mg, Oral, TID  Milnacipran HCl, 1 tablet, Oral, Daily  pantoprazole, 40 mg, Oral, BID after meals  pravastatin, 10 mg, Oral, HS    Continuous IV Infusions: none      PRN Meds:  acetaminophen, 650 mg, Oral, Q6H PRN  baclofen, 5 mg, Oral, Daily PRN  hydrALAZINE, 10 mg, Intravenous, Q8H PRN  HYDROmorphone, 0 2 mg, Intravenous, Q6H PRN  meclizine, 25 mg, Oral, 4x Daily PRN  melatonin, 3 mg, Oral, HS PRN  oxyCODONE, 5 mg, Oral, Q6H PRN - used x 1 5/11/2021     Or  oxyCODONE, 5 mg, Oral, Q6H PRN  simethicone, 80 mg, Oral, 4x Daily PRN        Discharge Plan: To be determined  Network Utilization Review Department  ATTENTION: Please call with any questions or concerns to [phone] and carefully listen to the prompts so that you are directed to the right person  All voicemails are confidential   [facility] all requests for admission clinical reviews, approved or denied determinations and any other requests to dedicated fax number below belonging to the campus where the patient is receiving treatment   List of dedicated fax numbers for the Facilities:  [address] DENIALS (Administrative/Medical Necessity) [phone]   [address] (Maternity/NICU/Pediatrics) [phone]   [address] [place] [address] [phone] [place] [address] [address] [facility] [address] [place] [address] [phone]

## 2021-05-13 ENCOUNTER — TRANSITIONAL CARE MANAGEMENT (OUTPATIENT)
Dept: FAMILY MEDICINE CLINIC | Facility: CLINIC | Age: 76
End: 2021-05-13

## 2021-05-14 ENCOUNTER — RA CDI HCC (OUTPATIENT)
Dept: OTHER | Facility: HOSPITAL | Age: 76
End: 2021-05-14

## 2021-05-14 NOTE — PROGRESS NOTES
[place] [id number]  coding opportunities        DX not used     Chart reviewed, (number of) suggestions sent to provider: 1     Problem listed updated  Provider Accepted, (number of) suggestions accepted: 1        Patients insurance company: [address]  (Medicare Advantage and [employer])     Visit status: Patient arrived for their scheduled appointment        [place] [address]  coding opportunities             Chart reviewed, (number of) suggestions sent to provider: 1     Problem listed updated   Provider Accepted, (number of) suggestions accepted: 1        Patients insurance company: [address]  (Medicare Advantage and [employer])           [place] [address]  coding opportunities     DX: E11 9 Type 2 diabetes mellitus without complications            Chart reviewed, (number of) suggestions sent to provider: 1           Patients insurance company: [address]  (Medicare Advantage and [employer])

## 2021-05-17 ENCOUNTER — OFFICE VISIT (OUTPATIENT)
Dept: FAMILY MEDICINE CLINIC | Facility: CLINIC | Age: 76
End: 2021-05-17
Payer: COMMERCIAL

## 2021-05-17 VITALS
OXYGEN SATURATION: 96 % | TEMPERATURE: 98.3 F | BODY MASS INDEX: 36.52 KG/M2 | SYSTOLIC BLOOD PRESSURE: 132 MMHG | HEIGHT: 60 IN | WEIGHT: 186 LBS | DIASTOLIC BLOOD PRESSURE: 86 MMHG | HEART RATE: 82 BPM

## 2021-05-17 DIAGNOSIS — K27.9 PUD (PEPTIC ULCER DISEASE): ICD-10-CM

## 2021-05-17 DIAGNOSIS — D50.8 IRON DEFICIENCY ANEMIA SECONDARY TO INADEQUATE DIETARY IRON INTAKE: ICD-10-CM

## 2021-05-17 DIAGNOSIS — R11.2 NAUSEA AND VOMITING, INTRACTABILITY OF VOMITING NOT SPECIFIED, UNSPECIFIED VOMITING TYPE: ICD-10-CM

## 2021-05-17 DIAGNOSIS — E11.9 TYPE 2 DIABETES MELLITUS WITHOUT COMPLICATION, WITHOUT LONG-TERM CURRENT USE OF INSULIN (HCC): ICD-10-CM

## 2021-05-17 DIAGNOSIS — R10.31 RIGHT LOWER QUADRANT ABDOMINAL PAIN: Primary | ICD-10-CM

## 2021-05-17 PROBLEM — E46 HYPOALBUMINEMIA DUE TO PROTEIN-CALORIE MALNUTRITION (HCC): Status: ACTIVE | Noted: 2021-05-17

## 2021-05-17 PROBLEM — E88.09 HYPOALBUMINEMIA DUE TO PROTEIN-CALORIE MALNUTRITION (HCC): Status: ACTIVE | Noted: 2021-05-17

## 2021-05-17 PROBLEM — E66.01 OBESITY, MORBID (HCC): Status: ACTIVE | Noted: 2021-05-17

## 2021-05-17 LAB — SL AMB POCT HEMOGLOBIN AIC: 7.2 (ref ?–6.5)

## 2021-05-17 PROCEDURE — 83036 HEMOGLOBIN GLYCOSYLATED A1C: CPT | Performed by: NURSE PRACTITIONER

## 2021-05-17 PROCEDURE — 1111F DSCHRG MED/CURRENT MED MERGE: CPT | Performed by: NURSE PRACTITIONER

## 2021-05-17 PROCEDURE — 99495 TRANSJ CARE MGMT MOD F2F 14D: CPT | Performed by: NURSE PRACTITIONER

## 2021-05-17 RX ORDER — ONDANSETRON HYDROCHLORIDE 8 MG/1
8 TABLET, FILM COATED ORAL EVERY 8 HOURS PRN
Qty: 20 TABLET | Refills: 1 | Status: SHIPPED | OUTPATIENT
Start: 2021-05-17 | End: 2022-03-09 | Stop reason: SDUPTHER

## 2021-05-17 NOTE — PROGRESS NOTES
Subjective:      Patient ID: Sanjay Valdes is a 64 y o  female  Chief complaint: 64 y  o female presenting for hospital follow up for right lower quadrant abdominal pain  HMI: Patient was admitted on 05/10/2021 to [facility] for worsening right lower quadrant abdominal pain  Patient discharged on 05/12/2021 with the diagnoses of right lower quadrant abdominal pain with unclear etiology, suspected remnants from improving recent diverticulitis  The pain would worsen with po intake along with reports of loose stool and bloating  A CT of abdomen without contrast exhibited diverticulosis without evidence of diverticulitis  Patient currently eating a mechanical soft diet without increased pain, nausea or vomiting  Patient reports that she had some pot roast at her daughter's house last night and she developed pain and bloating so took some simethicone with improvement noted  Patient had EDG 07/2020 due to coffee ground emesis with diagnosis of peptic ulcer disease  She was treat with twice a day pantoprazole and four times a day Carafate  Patient also as history of gastroparesis  Patient Reglan increased from 5 mg to 10 mg with improvement of symptoms  Patient requesting to be referred to Dr Giulia Ward if further GI work-up required  Patient was to have a repeat EGD in July 20200 along with due for colonoscopy, so will refer to GI physician of patient's chose  Patient's discharge medications reviewed with her  Patient's iron supplement was discontinued on discharge but patient advised to restart due to history of iron deficiency anemia  Patient was on Percocet and Duragesic patches for chronic degenerative lumbar spinal stenosis  Patient reports that she stopped using the patches and only using Percocet and baclofen to manage pain  She reports using the Percocet no more than twice a day and the baclofen at bedtime   Patient reports having increased need to urinate at bedtime after use of oxybutynin so patient instructed to take it earlier in the evening verus at bedtime  Family History   Problem Relation Age of Onset    Lung cancer Mother 55    Pulmonary embolism Father     Stroke Maternal Grandmother     Heart attack Maternal Grandfather     No Known Problems Paternal Grandmother     No Known Problems Paternal Grandfather     Diabetes Family         Diabetes mellitus    Hypertension Family     Stroke Family         Stroke complications    No Known Problems Daughter     No Known Problems Daughter     No Known Problems Sister     No Known Problems Maternal Aunt      Social History     Socioeconomic History    Marital status:       Spouse name: Not on file    Number of children: Not on file    Years of education: Not on file    Highest education level: Not on file   Occupational History    Occupation: Retired   Social Needs    Financial resource strain: Not on file    Food insecurity     Worry: Not on file     Inability: Not on file   [employer] needs     Medical: Not on file     Non-medical: Not on file   Tobacco Use    Smoking status: Former Smoker     Quit date: 1970     Years since quittin 4    Smokeless tobacco: Never Used    Tobacco comment: Denied history of current ever day smoker, Former smoker and Never smoker all documented in [employer]   Substance and Sexual Activity    Alcohol use: Not Currently     Comment: Denied history of alcohol use    Drug use: No     Comment: Denied history of drug use    Sexual activity: Not Currently   Lifestyle    Physical activity     Days per week: Not on file     Minutes per session: Not on file    Stress: Not on file   Relationships    Social connections     Talks on phone: Not on file     Gets together: Not on file     Attends Confucianist service: Not on file     Active member of club or organization: Not on file     Attends meetings of clubs or organizations: Not on file     Relationship status: Not on file    Intimate partner violence     Fear of current or ex partner: Not on file     Emotionally abused: Not on file     Physically abused: Not on file     Forced sexual activity: Not on file   Other Topics Concern    Not on file   Social History Narrative    Marital History - Currently  per [employer]     E-Cigarette/Vaping    E-Cigarette Use Never User      E-Cigarette/Vaping Substances     Past Medical History:   Diagnosis Date    Acid reflux     Anxiety     Arthritis     Asthma     Basal cell carcinoma     upper lip    Chronic narcotic dependence ([facility] )     Chronic pain     Colon polyp     Cystocele     Diabetes mellitus ([facility] )     Diverticulosis     Dysfunctional uterine bleeding     last assessed - [id number]    Fibromyalgia     Gastric ulcer     Gastroparesis     History of colonic polyps     last assessed - [id number]    History of gastroesophageal reflux (GERD)     Hypercholesterolemia     Hyperlipidemia     Hypertension     IBS (irritable bowel syndrome)     Kidney stone     Post laminectomy syndrome     Seasonal allergies     Spinal stenosis      Past Surgical History:   Procedure Laterality Date    APPENDECTOMY      BACK SURGERY      BREAST CYST ASPIRATION Left     pt unsure what exactly was removed    CHOLECYSTECTOMY      ESOPHAGOGASTRODUODENOSCOPY N/A 9/28/2016    Procedure: ESOPHAGOGASTRODUODENOSCOPY (EGD); Surgeon: Hong Villar MD;  [facility];   Service:     HERNIA REPAIR      HYSTERECTOMY      TTAH-BSO age 25   [facility] LAMINECTOMY      LUMBAR LAMINECTOMY      OOPHORECTOMY      age 25   [facility] [place] ARTHRODESIS POSTERIOR/POSTEROLATERAL THORACIC N/A 6/4/2018    Procedure: Reopening of lumbar incision for T12-L5 posterior instrumented fixation and fusion and T12-L4 posterior decompression;  Surgeon: Saranya Garrett MD;  [facility];  Service: Neurosurgery    TX COLONOSCOPY FLX DX W/COLLJ SPEC WHEN PFRMD N/A 3/2/2016    Procedure: EGD AND COLONOSCOPY; Surgeon: Carlee Newman MD;  [facility]; Service: Gastroenterology    NV DILATE ESOPHAGUS N/A 9/28/2016    Procedure: DILATATION ESOPHAGEAL;  Surgeon: Carlee Newman MD;  [facility]; Service: Gastroenterology    NV ESOPHAGOGASTRODUODENOSCOPY TRANSORAL DIAGNOSTIC N/A 7/18/2016    Procedure: ESOPHAGOGASTRODUODENOSCOPY (EGD); Surgeon: Carlee Newman MD;  [facility];   Service: Gastroenterology    NV IMPLANT SPINAL NEUROSTIM/ Left 6/4/2018    Procedure: removal of left buttock implantable pulse generator and placement of new  implantable pulse generator;  Surgeon: Paxton Marie MD;  [facility];  Service: Neurosurgery     Allergies   Allergen Reactions    Penicillins Anaphylaxis and Other (See Comments)     Other reaction(s): Unknown Reaction    Sulfa Antibiotics Anaphylaxis and Other (See Comments)     Other reaction(s): Unknown Reaction    Aspartame - Food Allergy Other (See Comments) and Hypertension     Slurred speech, weakness, stroke sx    Iodinated Diagnostic Agents Hives     Pt has taken prep prior for contrast and has not had any break through reaction    Keflex [Cephalexin]        Current Outpatient Medications:     albuterol (PROVENTIL HFA,VENTOLIN HFA) 90 mcg/act inhaler, Inhale 2 puffs every 6 (six) hours as needed for wheezing or shortness of breath, Disp: 1 Inhaler, Rfl: 5    baclofen 10 mg tablet, Take 1 tablet (10 mg total) by mouth 3 (three) times a day as needed for muscle spasms, Disp: 30 tablet, Rfl: 3    gabapentin (NEURONTIN) 300 mg capsule, 1 in am and 2 hs, Disp: 270 capsule, Rfl: 3    glucose blood test strip, Bid testing, Disp: 100 each, Rfl: 6    latanoprost (XALATAN) 0 005 % ophthalmic solution, Administer 1 drop to both eyes daily at bedtime, Disp: 2 5 mL, Rfl: 3    levothyroxine 25 mcg tablet, Take 1 5 tablets (37 5 mcg total) by mouth daily, Disp: 45 tablet, Rfl: 3    lisinopril (ZESTRIL) 20 mg tablet, Take 1 tablet (20 mg total) by mouth daily, Disp: 30 tablet, Rfl: 3    metFORMIN (GLUCOPHAGE) 1000 MG tablet, Take 1 tablet (1,000 mg total) by mouth 2 (two) times a day with meals, Disp: 180 tablet, Rfl: 3    metoclopramide (REGLAN) 5 mg tablet, Take 1 tablet (5 mg total) by mouth 3 (three) times a day (Patient taking differently: Take 5 mg by mouth 3 (three) times a day ), Disp: 30 tablet, Rfl: 0    Milnacipran HCl (Savella) 100 MG TABS, Take 1 tablet (100 mg total) by mouth daily, Disp: 30 tablet, Rfl: 3    neomycin-polymyxin-hydrocortisone (CORTISPORIN) otic solution, Administer 4 drops into both ears every 6 (six) hours, Disp: 10 mL, Rfl: 0    nitrofurantoin (MACRODANTIN) 50 mg capsule, Take 1 capsule (50 mg total) by mouth daily at bedtime, Disp: 30 capsule, Rfl: 3    ondansetron (ZOFRAN) 8 mg tablet, Take 1 tablet (8 mg total) by mouth every 8 (eight) hours as needed for nausea or vomiting, Disp: 20 tablet, Rfl: 1    oxyCODONE-acetaminophen (PERCOCET)  mg per tablet, Take 1 tablet by mouth every 6 (six) hours as needed for severe painMax Daily Amount: 4 tablets, Disp: 100 tablet, Rfl: 0    pravastatin (PRAVACHOL) 10 mg tablet, Take 1 tablet (10 mg total) by mouth daily at bedtime, Disp: 90 tablet, Rfl: 5    simethicone (MYLICON) 80 mg chewable tablet, Chew 1 tablet (80 mg total) 4 (four) times a day as needed for flatulence, Disp: 20 tablet, Rfl: 0    meclizine (ANTIVERT) 25 mg tablet, Take 1 tablet (25 mg total) by mouth 4 (four) times a day as needed for dizziness, Disp: 60 tablet, Rfl: 3    oxybutynin (DITROPAN) 5 mg tablet, Take 1 tablet (5 mg total) by mouth 3 (three) times a day, Disp: 90 tablet, Rfl: 3    pantoprazole (PROTONIX) 40 mg tablet, Take 1 tablet (40 mg total) by mouth 2 (two) times daily after meals, Disp: 60 tablet, Rfl: 2      Family History   Problem Relation Age of Onset    Lung cancer Mother 55    Pulmonary embolism Father     Stroke Maternal Grandmother     Heart attack Maternal Grandfather  No Known Problems Paternal Grandmother     No Known Problems Paternal Grandfather     Diabetes Family         Diabetes mellitus    Hypertension Family     Stroke Family         Stroke complications    No Known Problems Daughter     No Known Problems Daughter     No Known Problems Sister     No Known Problems Maternal Aunt      Social History     Socioeconomic History    Marital status:       Spouse name: Not on file    Number of children: Not on file    Years of education: Not on file    Highest education level: Not on file   Occupational History    Occupation: Retired   Social Needs    Financial resource strain: Not on file    Food insecurity     Worry: Not on file     Inability: Not on file   [employer] needs     Medical: Not on file     Non-medical: Not on file   Tobacco Use    Smoking status: Former Smoker     Quit date: 1970     Years since quittin 4    Smokeless tobacco: Never Used    Tobacco comment: Denied history of current ever day smoker, Former smoker and Never smoker all documented in [employer]   Substance and Sexual Activity    Alcohol use: Not Currently     Comment: Denied history of alcohol use    Drug use: No     Comment: Denied history of drug use    Sexual activity: Not Currently   Lifestyle    Physical activity     Days per week: Not on file     Minutes per session: Not on file    Stress: Not on file   Relationships    Social connections     Talks on phone: Not on file     Gets together: Not on file     Attends Sabianism service: Not on file     Active member of club or organization: Not on file     Attends meetings of clubs or organizations: Not on file     Relationship status: Not on file    Intimate partner violence     Fear of current or ex partner: Not on file     Emotionally abused: Not on file     Physically abused: Not on file     Forced sexual activity: Not on file   Other Topics Concern    Not on file   Social History Narrative Marital History - Currently  per [employer]     E-Cigarette/Vaping    E-Cigarette Use Never User      E-Cigarette/Vaping Substances     Past Medical History:   Diagnosis Date    Acid reflux     Anxiety     Arthritis     Asthma     Basal cell carcinoma     upper lip    Chronic narcotic dependence (HCC)     Chronic pain     Colon polyp     Cystocele     Diabetes mellitus ([facility] )     Diverticulosis     Dysfunctional uterine bleeding     last assessed - [id number]    Fibromyalgia     Gastric ulcer     Gastroparesis     History of colonic polyps     last assessed - [id number]    History of gastroesophageal reflux (GERD)     Hypercholesterolemia     Hyperlipidemia     Hypertension     IBS (irritable bowel syndrome)     Kidney stone     Post laminectomy syndrome     Seasonal allergies     Spinal stenosis      Past Surgical History:   Procedure Laterality Date    APPENDECTOMY      BACK SURGERY      BREAST CYST ASPIRATION Left     pt unsure what exactly was removed    CHOLECYSTECTOMY      ESOPHAGOGASTRODUODENOSCOPY N/A 9/28/2016    Procedure: ESOPHAGOGASTRODUODENOSCOPY (EGD); Surgeon: William Rao MD;  [facility]; Service:     HERNIA REPAIR      HYSTERECTOMY      TTAH-BSO age 25   [facility] LAMINECTOMY      LUMBAR LAMINECTOMY      OOPHORECTOMY      age 25   [facility] CT ARTHRODESIS POSTERIOR/POSTEROLATERAL THORACIC N/A 6/4/2018    Procedure: Reopening of lumbar incision for T12-L5 posterior instrumented fixation and fusion and T12-L4 posterior decompression;  Surgeon: Alida Fernandez MD;  [facility];  Service: Neurosurgery    CT COLONOSCOPY FLX DX W/COLLJ SPEC WHEN PFRMD N/A 3/2/2016    Procedure: EGD AND COLONOSCOPY;  Surgeon: William Rao MD;  [facility]; Service: Gastroenterology    CT DILATE ESOPHAGUS N/A 9/28/2016    Procedure: DILATATION ESOPHAGEAL;  Surgeon: William Rao MD;  [facility];   Service: Gastroenterology    CT ESOPHAGOGASTRODUODENOSCOPY TRANSORAL DIAGNOSTIC N/A 7/18/2016    Procedure: ESOPHAGOGASTRODUODENOSCOPY (EGD); Surgeon: Daily Moore MD;  [facility];   Service: Gastroenterology    [place] IMPLANT SPINAL NEUROSTIM/ Left 6/4/2018    Procedure: removal of left buttock implantable pulse generator and placement of new  implantable pulse generator;  Surgeon: Bianca Trejo MD;  [facility];  Service: Neurosurgery     Allergies   Allergen Reactions    Penicillins Anaphylaxis and Other (See Comments)     Other reaction(s): Unknown Reaction    Sulfa Antibiotics Anaphylaxis and Other (See Comments)     Other reaction(s): Unknown Reaction    Aspartame - Food Allergy Other (See Comments) and Hypertension     Slurred speech, weakness, stroke sx    Iodinated Diagnostic Agents Hives     Pt has taken prep prior for contrast and has not had any break through reaction    Keflex [Cephalexin]        Current Outpatient Medications:     albuterol (PROVENTIL HFA,VENTOLIN HFA) 90 mcg/act inhaler, Inhale 2 puffs every 6 (six) hours as needed for wheezing or shortness of breath, Disp: 1 Inhaler, Rfl: 5    baclofen 10 mg tablet, Take 1 tablet (10 mg total) by mouth 3 (three) times a day as needed for muscle spasms, Disp: 30 tablet, Rfl: 3    gabapentin (NEURONTIN) 300 mg capsule, 1 in am and 2 hs, Disp: 270 capsule, Rfl: 3    glucose blood test strip, Bid testing, Disp: 100 each, Rfl: 6    latanoprost (XALATAN) 0 005 % ophthalmic solution, Administer 1 drop to both eyes daily at bedtime, Disp: 2 5 mL, Rfl: 3    levothyroxine 25 mcg tablet, Take 1 5 tablets (37 5 mcg total) by mouth daily, Disp: 45 tablet, Rfl: 3    lisinopril (ZESTRIL) 20 mg tablet, Take 1 tablet (20 mg total) by mouth daily, Disp: 30 tablet, Rfl: 3    metFORMIN (GLUCOPHAGE) 1000 MG tablet, Take 1 tablet (1,000 mg total) by mouth 2 (two) times a day with meals, Disp: 180 tablet, Rfl: 3    metoclopramide (REGLAN) 5 mg tablet, Take 1 tablet (5 mg total) by mouth 3 (three) times a day (Patient taking differently: Take 5 mg by mouth 3 (three) times a day ), Disp: 30 tablet, Rfl: 0    Milnacipran HCl (Savella) 100 MG TABS, Take 1 tablet (100 mg total) by mouth daily, Disp: 30 tablet, Rfl: 3    neomycin-polymyxin-hydrocortisone (CORTISPORIN) otic solution, Administer 4 drops into both ears every 6 (six) hours, Disp: 10 mL, Rfl: 0    nitrofurantoin (MACRODANTIN) 50 mg capsule, Take 1 capsule (50 mg total) by mouth daily at bedtime, Disp: 30 capsule, Rfl: 3    ondansetron (ZOFRAN) 8 mg tablet, Take 1 tablet (8 mg total) by mouth every 8 (eight) hours as needed for nausea or vomiting, Disp: 20 tablet, Rfl: 1    oxyCODONE-acetaminophen (PERCOCET)  mg per tablet, Take 1 tablet by mouth every 6 (six) hours as needed for severe painMax Daily Amount: 4 tablets, Disp: 100 tablet, Rfl: 0    pravastatin (PRAVACHOL) 10 mg tablet, Take 1 tablet (10 mg total) by mouth daily at bedtime, Disp: 90 tablet, Rfl: 5    simethicone (MYLICON) 80 mg chewable tablet, Chew 1 tablet (80 mg total) 4 (four) times a day as needed for flatulence, Disp: 20 tablet, Rfl: 0    meclizine (ANTIVERT) 25 mg tablet, Take 1 tablet (25 mg total) by mouth 4 (four) times a day as needed for dizziness, Disp: 60 tablet, Rfl: 3    oxybutynin (DITROPAN) 5 mg tablet, Take 1 tablet (5 mg total) by mouth 3 (three) times a day, Disp: 90 tablet, Rfl: 3    pantoprazole (PROTONIX) 40 mg tablet, Take 1 tablet (40 mg total) by mouth 2 (two) times daily after meals, Disp: 60 tablet, Rfl: 2    Review of Systems   Constitutional: Negative for activity change, appetite change and unexpected weight change  HENT: Negative  Eyes: Negative  Respiratory: Negative for cough, chest tightness, shortness of breath and wheezing  Cardiovascular: Negative for chest pain, palpitations and leg swelling  Gastrointestinal: Positive for abdominal distention and abdominal pain  Negative for constipation, diarrhea and nausea  Endocrine: Negative  Genitourinary: Negative  Musculoskeletal: Negative  Skin: Negative for color change and rash  Allergic/Immunologic: Negative  Neurological: Negative for dizziness, weakness, light-headedness and headaches  Hematological: Negative  Psychiatric/Behavioral: Negative  Objective:    /86   Pulse 82   Temp 98 3 °F (36 8 °C)   Ht 5' (1 524 m)   Wt 84 4 kg (186 lb)   LMP  (LMP Unknown)   SpO2 96%   BMI 36 33 kg/m² (Reviewed)     Physical Exam  Vitals signs reviewed  Constitutional:       General: She is not in acute distress  Appearance: Normal appearance  She is well-developed and well-groomed  She is not ill-appearing  HENT:      Head: Normocephalic and atraumatic  Right Ear: External ear normal       Left Ear: External ear normal       Nose:      Comments: Patient had a facial covering in place as per office protocol  Eyes:      General: Lids are normal       Extraocular Movements: Extraocular movements intact  Conjunctiva/sclera: Conjunctivae normal       Pupils: Pupils are equal, round, and reactive to light  Neck:      Musculoskeletal: Neck supple  Trachea: Trachea normal    Cardiovascular:      Rate and Rhythm: Normal rate and regular rhythm  Heart sounds: Normal heart sounds  Pulmonary:      Effort: Pulmonary effort is normal       Breath sounds: Normal breath sounds  Abdominal:      General: Bowel sounds are normal  There is no distension  Palpations: Abdomen is soft  There is no mass  Tenderness: There is no abdominal tenderness  Skin:     General: Skin is warm and dry  Capillary Refill: Capillary refill takes less than 2 seconds  Neurological:      General: No focal deficit present  Mental Status: She is alert and oriented to person, place, and time  Psychiatric:         Mood and Affect: Mood normal          Behavior: Behavior normal  Behavior is cooperative  Thought Content:  Thought content normal          Assessment:     Diagnoses and all orders for this visit:    Right lower quadrant abdominal pain    Type 2 diabetes mellitus without complication, without long-term current use of insulin (HCC)  -     POCT hemoglobin A1c    PUD (peptic ulcer disease)  -     Ambulatory referral to Gastroenterology; Future    Iron deficiency anemia secondary to inadequate dietary iron intake  -     Ambulatory referral to Gastroenterology; Future    Nausea and vomiting, intractability of vomiting not specified, unspecified vomiting type  -     ondansetron (ZOFRAN) 8 mg tablet;  Take 1 tablet (8 mg total) by mouth every 8 (eight) hours as needed for nausea or vomiting

## 2021-05-18 ENCOUNTER — TELEPHONE (OUTPATIENT)
Dept: GASTROENTEROLOGY | Facility: CLINIC | Age: 76
End: 2021-05-18

## 2021-05-18 ENCOUNTER — TELEPHONE (OUTPATIENT)
Dept: ADMINISTRATIVE | Facility: OTHER | Age: 76
End: 2021-05-18

## 2021-05-18 NOTE — LETTER
Diabetic Eye Exam Form    Date Requested: 21  Patient: Lilia Crain  Patient : 1945   Referring Provider: NANY Adams    Dilated Retinal Exam, Optomap-Iris Exam, or Fundus Photography Done         Yes ([place] one above)         No     Date of Diabetic Eye Exam ______________________________  Left Eye      Exam did show retinopathy    Exam did not show retinopathy         Mild       Moderate       None       Proliferative       Severe     Right Eye     Exam did show retinopathy    Exam did not show retinopathy         Mild       Moderate       None       Proliferative       Severe     Comments __________________________________________________________    Practice Providing Exam ______________________________________________    Exam Performed By (print name) _______________________________________      Provider Signature ___________________________________________________      These reports are needed for  compliance    Please fax this completed form and a copy of the Diabetic Eye Exam report to our office located at [place] [address] as soon as possible to [phone] attention Christal: Phone [phone]    We thank you for your assistance in treating our mutual patient     (sent to Dr Loco Waldron)

## 2021-05-18 NOTE — TELEPHONE ENCOUNTER
----- Message from Raf Jackson sent at 5/17/2021  1:53 PM EDT -----  Regarding: eye exam  05/17/21 1:56 PM    Hello, our patient Kaci Parra has had Diabetic Foot Exam completed/performed  Please assist in updating the patient chart by making an External outreach to Dr Madeleine Wilkes facility located in [place], [place]  The date of service is 3/2021      Thank you,  ALICE Jackson  [facility]

## 2021-05-18 NOTE — TELEPHONE ENCOUNTER
Pt saw Dr Adeel Clarke in past in 2014 and is overdue for colon for hx of diverticulitis/hx of tubular adenoma polyps  However, we received an order for pt to be scheduled for an appt for PUD  I lmom for pt to please call back to schedule an appt  Will jelena pt again in one week if do not hear back from her

## 2021-05-18 NOTE — TELEPHONE ENCOUNTER
Upon review of the In Basket request and the patient's chart, initial outreach has been made via fax, please see Contacts section for details       Thank you  Yumiko Lopez MA

## 2021-05-18 NOTE — LETTER
Diabetic Eye Exam Form    Date Requested: 21  Patient: Alivia Rachel  Patient : 1945   Referring Provider: NANY Alvarez    Dilated Retinal Exam, Optomap-Iris Exam, or Fundus Photography Done         Yes ([place] one above)         No     Date of Diabetic Eye Exam ______________________________  Left Eye      Exam did show retinopathy    Exam did not show retinopathy         Mild       Moderate       None       Proliferative       Severe     Right Eye     Exam did show retinopathy    Exam did not show retinopathy         Mild       Moderate       None       Proliferative       Severe     Comments __________________________________________________________    Practice Providing Exam ______________________________________________    Exam Performed By (print name) _______________________________________      Provider Signature ___________________________________________________      These reports are needed for  compliance    Please fax this completed form and a copy of the Diabetic Eye Exam report to our office located at [place] [address] as soon as possible to [phone] attention Christal: Phone [phone]    We thank you for your assistance in treating our mutual patient     (sent to Dr Ej North)

## 2021-05-19 NOTE — TELEPHONE ENCOUNTER
Pt is scheduled with Dr Roberto Simpson as Dr Halley Healy is booking out in to Oct  She will call back if her PCP does not want her to see a different

## 2021-05-20 ENCOUNTER — HOSPITAL ENCOUNTER (OUTPATIENT)
Dept: RADIOLOGY | Age: 76
Discharge: HOME | End: 2021-05-20
Payer: COMMERCIAL

## 2021-05-20 VITALS — WEIGHT: 186 LBS | BODY MASS INDEX: 36.52 KG/M2 | HEIGHT: 60 IN

## 2021-05-20 DIAGNOSIS — Z12.31 ENCOUNTER FOR SCREENING MAMMOGRAM FOR MALIGNANT NEOPLASM OF BREAST: ICD-10-CM

## 2021-05-20 PROCEDURE — 77067 SCR MAMMO BI INCL CAD: CPT

## 2021-05-20 PROCEDURE — 77063 BREAST TOMOSYNTHESIS BI: CPT

## 2021-05-24 NOTE — TELEPHONE ENCOUNTER
As a follow-up, a second attempt has been made for outreach via fax, please see Contacts section for details      Thank you  Justice Pham MA

## 2021-05-25 DIAGNOSIS — M54.50 LUMBAR BACK PAIN: ICD-10-CM

## 2021-05-25 DIAGNOSIS — M96.1 POST LAMINECTOMY SYNDROME: ICD-10-CM

## 2021-05-25 RX ORDER — OXYCODONE AND ACETAMINOPHEN 10; 325 MG/1; MG/1
1 TABLET ORAL EVERY 6 HOURS PRN
Qty: 100 TABLET | Refills: 0 | Status: SHIPPED | OUTPATIENT
Start: 2021-05-25 | End: 2021-06-24 | Stop reason: SDUPTHER

## 2021-05-27 NOTE — TELEPHONE ENCOUNTER
As a final attempt, a third outreach has been made via telephone call  Please see Contacts section for details  This encounter will be closed and completed by end of day  Should we receive the requested information because of previous outreach attempts, the requested patient's chart will be updated appropriately       Thank you  Chilango Ignacio MA

## 2021-05-27 NOTE — TELEPHONE ENCOUNTER
Upon review of the In Basket request we were able to locate, review, and update the patient chart as requested for Diabetic Eye Exam     Any additional questions or concerns should be emailed to the Practice Liaisons via [email or web address] email, please do not reply via In Basket      Thank you  Nguyen King MA

## 2021-06-02 DIAGNOSIS — I10 ESSENTIAL HYPERTENSION: ICD-10-CM

## 2021-06-02 RX ORDER — LISINOPRIL 20 MG/1
20 TABLET ORAL DAILY
Qty: 30 TABLET | Refills: 3 | Status: ON HOLD | OUTPATIENT
Start: 2021-06-02 | End: 2022-06-21

## 2021-06-04 DIAGNOSIS — K31.84 GASTROPARESIS: Primary | ICD-10-CM

## 2021-06-04 DIAGNOSIS — M79.7 FIBROMYALGIA: ICD-10-CM

## 2021-06-04 RX ORDER — MILNACIPRAN HYDROCHLORIDE 100 MG/1
1 TABLET, FILM COATED ORAL DAILY
Qty: 30 TABLET | Refills: 3 | Status: SHIPPED | OUTPATIENT
Start: 2021-06-04 | End: 2021-09-13 | Stop reason: SDUPTHER

## 2021-06-04 RX ORDER — METOCLOPRAMIDE 10 MG/1
10 TABLET ORAL 4 TIMES DAILY
Qty: 120 TABLET | Refills: 3 | Status: SHIPPED | OUTPATIENT
Start: 2021-06-04 | End: 2021-06-07 | Stop reason: SDUPTHER

## 2021-06-07 DIAGNOSIS — K31.84 GASTROPARESIS: ICD-10-CM

## 2021-06-07 RX ORDER — METOCLOPRAMIDE 10 MG/1
20 TABLET ORAL 4 TIMES DAILY
Qty: 240 TABLET | Refills: 3 | Status: SHIPPED | OUTPATIENT
Start: 2021-06-07 | End: 2021-06-24

## 2021-06-15 ENCOUNTER — HOSPITAL ENCOUNTER (OUTPATIENT)
Dept: MAMMOGRAPHY | Facility: CLINIC | Age: 76
Discharge: HOME | End: 2021-06-15
Payer: COMMERCIAL

## 2021-06-15 ENCOUNTER — HOSPITAL ENCOUNTER (OUTPATIENT)
Dept: ULTRASOUND IMAGING | Facility: CLINIC | Age: 76
Discharge: HOME | End: 2021-06-15
Payer: COMMERCIAL

## 2021-06-15 VITALS — WEIGHT: 186 LBS | BODY MASS INDEX: 36.52 KG/M2 | HEIGHT: 60 IN

## 2021-06-15 DIAGNOSIS — R92.8 ABNORMAL MAMMOGRAM: ICD-10-CM

## 2021-06-15 PROCEDURE — 77065 DX MAMMO INCL CAD UNI: CPT

## 2021-06-15 PROCEDURE — G0279 TOMOSYNTHESIS, MAMMO: HCPCS

## 2021-06-15 PROCEDURE — 76642 ULTRASOUND BREAST LIMITED: CPT

## 2021-06-19 DIAGNOSIS — F51.01 PRIMARY INSOMNIA: Primary | ICD-10-CM

## 2021-06-19 RX ORDER — ZOLPIDEM TARTRATE 5 MG/1
5 TABLET ORAL
Qty: 30 TABLET | Refills: 0 | Status: SHIPPED | OUTPATIENT
Start: 2021-06-19 | End: 2021-07-23 | Stop reason: SDUPTHER

## 2021-06-28 DIAGNOSIS — N39.0 URINARY TRACT INFECTION WITHOUT HEMATURIA, SITE UNSPECIFIED: Primary | ICD-10-CM

## 2021-06-28 DIAGNOSIS — N32.81 OVERACTIVE BLADDER: ICD-10-CM

## 2021-06-28 RX ORDER — OXYBUTYNIN CHLORIDE 5 MG/1
5 TABLET ORAL 3 TIMES DAILY
Qty: 90 TABLET | Refills: 3 | Status: SHIPPED | OUTPATIENT
Start: 2021-06-28 | End: 2021-09-10 | Stop reason: SDUPTHER

## 2021-06-28 RX ORDER — CIPROFLOXACIN 500 MG/1
500 TABLET, FILM COATED ORAL EVERY 12 HOURS SCHEDULED
Qty: 40 TABLET | Refills: 0 | Status: SHIPPED | OUTPATIENT
Start: 2021-06-28 | End: 2021-07-31

## 2021-07-13 ENCOUNTER — OFFICE VISIT (OUTPATIENT)
Dept: GASTROENTEROLOGY | Facility: CLINIC | Age: 76
End: 2021-07-13
Payer: COMMERCIAL

## 2021-07-13 VITALS
SYSTOLIC BLOOD PRESSURE: 158 MMHG | HEART RATE: 92 BPM | HEIGHT: 60 IN | BODY MASS INDEX: 35.93 KG/M2 | DIASTOLIC BLOOD PRESSURE: 91 MMHG | WEIGHT: 183 LBS

## 2021-07-13 DIAGNOSIS — K57.92 DIVERTICULITIS: ICD-10-CM

## 2021-07-13 DIAGNOSIS — D50.8 IRON DEFICIENCY ANEMIA SECONDARY TO INADEQUATE DIETARY IRON INTAKE: ICD-10-CM

## 2021-07-13 DIAGNOSIS — K31.84 GASTROPARESIS: Primary | ICD-10-CM

## 2021-07-13 DIAGNOSIS — K27.9 PUD (PEPTIC ULCER DISEASE): ICD-10-CM

## 2021-07-13 PROCEDURE — 1036F TOBACCO NON-USER: CPT | Performed by: INTERNAL MEDICINE

## 2021-07-13 PROCEDURE — 99213 OFFICE O/P EST LOW 20 MIN: CPT | Performed by: INTERNAL MEDICINE

## 2021-07-13 PROCEDURE — 1160F RVW MEDS BY RX/DR IN RCRD: CPT | Performed by: INTERNAL MEDICINE

## 2021-07-13 NOTE — PROGRESS NOTES
[facility] - Outpatient Follow-up Note  Khadijah Jon 64 y o  female MRN: [id number]  Encounter: [id number]          ASSESSMENT AND PLAN:      1  PUD (peptic ulcer disease)    Continue pantoprazole 40 mg b i d  for now  Will plan further evaluation with EGD  - Ambulatory referral to Gastroenterology  - EGD; Future    2  Iron deficiency anemia secondary to inadequate dietary iron intake   chronic and stable  Will evaluate endoscopically  - Ambulatory referral to Gastroenterology  - EGD; Future    3  Gastroparesis   continue gastroparesis diet and metoclopramide  We discussed possible long-term risks of extrapyramidal side effects  Domperidone would be contraindicated given QTc 518 on recent ECG    4  Diverticulitis   will evaluate with colonoscopy  - Colonoscopy; Future    ______________________________________________________________________    SUBJECTIVE:   Patient returns in follow-up of peptic ulcer disease and recent hospitalization for possible diverticulitis  Has had a history of gastroparesis as well and has been on metoclopramide for more than a year, previously 3 times a day, now 4 times daily 10 mg  This seems to be working reasonably well for her though she does continue to have early satiety and nausea  EGD in July 2020 revealed multiple pyloric ulcers and recommendation for repeat  She believes that she underwent repeat EGD though  I can find no records of this  Recently hospitalized with abdominal pain but possibly represent diverticulitis  Noncontrast CT revealed no acute findings, however  REVIEW OF SYSTEMS:    Review of Systems   Gastrointestinal: Positive for bloating and abdominal pain  Negative for constipation, diarrhea, dysphagia, heartburn, jaundice, melena and vomiting  Genitourinary: Positive for frequency            Historical Information   Past Medical History:   Diagnosis Date    Acid reflux     Anxiety     Arthritis     Asthma     Basal cell carcinoma     upper lip    Chronic narcotic dependence (HCC)     Chronic pain     Colon polyp     Cystocele     Diabetes mellitus ([facility] [place] [address] )     Diverticulosis     Dysfunctional uterine bleeding     last assessed - [id number]    Fibromyalgia     Gastric ulcer     Gastroparesis     History of colonic polyps     last assessed - [id number]    History of gastroesophageal reflux (GERD)     Hypercholesterolemia     Hyperlipidemia     Hypertension     IBS (irritable bowel syndrome)     Kidney stone     Post laminectomy syndrome     Seasonal allergies     Spinal stenosis      Past Surgical History:   Procedure Laterality Date    APPENDECTOMY      BACK SURGERY      BREAST CYST ASPIRATION Left     pt unsure what exactly was removed    CHOLECYSTECTOMY      ESOPHAGOGASTRODUODENOSCOPY N/A 9/28/2016    Procedure: ESOPHAGOGASTRODUODENOSCOPY (EGD); Surgeon: William Vazquez MD;  [facility]; Service:     HERNIA REPAIR      HYSTERECTOMY      TTAH-BSO age 25   [facility] LAMINECTOMY      LUMBAR LAMINECTOMY      OOPHORECTOMY      age 25   [facility] AR ARTHRODESIS POSTERIOR/POSTEROLATERAL THORACIC N/A 6/4/2018    Procedure: Reopening of lumbar incision for T12-L5 posterior instrumented fixation and fusion and T12-L4 posterior decompression;  Surgeon: Stephen Abraham MD;  [facility];  Service: Neurosurgery    AR COLONOSCOPY FLX DX W/COLLJ SPEC WHEN PFRMD N/A 3/2/2016    Procedure: EGD AND COLONOSCOPY;  Surgeon: William Vazquez MD;  [facility]; Service: Gastroenterology    AR DILATE ESOPHAGUS N/A 9/28/2016    Procedure: DILATATION ESOPHAGEAL;  Surgeon: William Vazquez MD;  [facility]; Service: Gastroenterology    AR ESOPHAGOGASTRODUODENOSCOPY TRANSORAL DIAGNOSTIC N/A 7/18/2016    Procedure: ESOPHAGOGASTRODUODENOSCOPY (EGD); Surgeon: William Vazquez MD;  [facility];   Service: Gastroenterology    AR IMPLANT SPINAL NEUROSTIM/ Left 6/4/2018    Procedure: removal of left buttock implantable pulse generator and placement of new  implantable pulse generator;  Surgeon: Nishi Harris MD;  [facility];  Service: Neurosurgery     Social History   Social History     Substance and Sexual Activity   Alcohol Use Not Currently    Comment: Denied history of alcohol use     Social History     Substance and Sexual Activity   Drug Use No    Comment: Denied history of drug use     Social History     Tobacco Use   Smoking Status Former Smoker    Quit date: 1970    Years since quittin 5   Smokeless Tobacco Never Used   Tobacco Comment    Denied history of current ever day smoker, Former smoker and Never smoker all documented in [employer]     Family History   Problem Relation Age of Onset    Lung cancer Mother 55    Pulmonary embolism Father     Stroke Maternal Grandmother     Heart attack Maternal Grandfather     No Known Problems Paternal Grandmother     No Known Problems Paternal Grandfather     Diabetes Family         Diabetes mellitus    Hypertension Family     Stroke Family         Stroke complications    No Known Problems Daughter     No Known Problems Daughter     No Known Problems Sister     No Known Problems Maternal Aunt        Meds/Allergies       Current Outpatient Medications:     albuterol (PROVENTIL HFA,VENTOLIN HFA) 90 mcg/act inhaler    gabapentin (NEURONTIN) 300 mg capsule    glucose blood test strip    levothyroxine 25 mcg tablet    lisinopril (ZESTRIL) 20 mg tablet    metFORMIN (GLUCOPHAGE) 1000 MG tablet    metoclopramide (REGLAN) 10 mg tablet    Milnacipran HCl (Savella) 100 MG TABS    nitrofurantoin (MACRODANTIN) 50 mg capsule    ondansetron (ZOFRAN) 8 mg tablet    oxybutynin (DITROPAN) 5 mg tablet    oxyCODONE-acetaminophen (PERCOCET)  mg per tablet    pantoprazole (PROTONIX) 40 mg tablet    pravastatin (PRAVACHOL) 10 mg tablet    zolpidem (AMBIEN) 5 mg tablet    baclofen 10 mg tablet    ciprofloxacin (CIPRO) 500 mg tablet   latanoprost (XALATAN) 0 005 % ophthalmic solution    meclizine (ANTIVERT) 25 mg tablet    neomycin-polymyxin-hydrocortisone (CORTISPORIN) otic solution    simethicone (MYLICON) 80 mg chewable tablet    Allergies   Allergen Reactions    Penicillins Anaphylaxis and Other (See Comments)     Other reaction(s): Unknown Reaction    Sulfa Antibiotics Anaphylaxis and Other (See Comments)     Other reaction(s): Unknown Reaction    Aspartame - Food Allergy Other (See Comments) and Hypertension     Slurred speech, weakness, stroke sx    Iodinated Diagnostic Agents Hives     Pt has taken prep prior for contrast and has not had any break through reaction    Keflex [Cephalexin]            Objective     Blood pressure 158/91, pulse 92, height 5' (1 524 m), weight 83 kg (183 lb), not currently breastfeeding  Body mass index is 35 74 kg/m²  PHYSICAL EXAM:      Physical Exam  Vitals and nursing note reviewed  Constitutional:       General: She is not in acute distress  Appearance: She is obese  She is not ill-appearing  HENT:      Head: Normocephalic and atraumatic  Eyes:      General: No scleral icterus  Extraocular Movements: Extraocular movements intact  Cardiovascular:      Rate and Rhythm: Normal rate and regular rhythm  Pulmonary:      Effort: Pulmonary effort is normal  No respiratory distress  Abdominal:      General: There is no distension  Palpations: Abdomen is soft  Tenderness: There is no abdominal tenderness  There is no guarding or rebound  Musculoskeletal:      Right lower leg: Edema present  Left lower leg: Edema present  Comments: Mild bilateral lower extremity edema   Skin:     General: Skin is warm and dry  Coloration: Skin is not cyanotic  Findings: No erythema  Neurological:      General: No focal deficit present  Mental Status: She is alert and oriented to person, place, and time     Psychiatric:         Mood and Affect: Mood normal  Behavior: Behavior normal           Lab Results:   No visits with results within 1 Day(s) from this visit  Latest known visit with results is:   Telephone on 05/18/2021   Component Date Value    Right Eye Diabetic Retin* 03/04/2021 None     Left Eye Diabetic Retino* 03/04/2021 None          Radiology Results:   Mammo diagnostic left w 3d & cad, US breast left limited (diagnostic)    Result Date: 6/15/2021  Narrative: DIAGNOSIS: Abnormal mammogram TECHNIQUE: Digital diagnostic mammography was performed  Computer Aided Detection (CAD) analyzed all applicable images  Ultrasound of the left breast(s) was performed  COMPARISONS: Prior breast imaging dated: 05/20/2021, 05/17/2019, 02/28/2018, 07/21/2016, and 05/01/2014 RELEVANT HISTORY: Family Breast Cancer History: No known family history of breast cancer  Family Medical History: No known relevant family medical history  Personal History: No known relevant hormone history  Surgical history includes breast cyst aspiration, hysterectomy, and oophorectomy  No known relevant medical history  RISK ASSESSMENT: 5 Year Tyrer-Cuzick: 1 17 % 10 Year Tyrer-Cuzick: No Score Lifetime Tyrer-Cuzick: 1 67 % TISSUE DENSITY: There are scattered areas of fibroglandular density  INDICATION: Conner Love is a 64 y o  female presenting for abnormal screening  FINDINGS: LEFT 1) ASYMMETRY Mammo diagnostic left w 3d & cad:  Previously noted asymmetry in the outer breast is not reproduced on the current exam and represented summation artifact  Fibroglandular tissue in this region has not significantly changed since 2007  No suspicious mass, architectural distortion, or grouped calcifications are noted  Postsurgical changes are noted  Benign-appearing calcifications are noted  Left breast ultrasound:  Targeted breast ultrasound was performed using a dedicated high-resolution probe  No suspicious sonographic finding is identified in the area of mammographic concern    Incidentally noted is a 5 mm simple cyst in the 2 o'clock position left breast, 11 cm from nipple  Impression: No evidence of malignancy  ASSESSMENT/BI-RADS CATEGORY: Left: 2 - Benign Overall: 2 - Benign RECOMMENDATION:      - Return to routine screening for both breasts   Workstation ID: [id number]

## 2021-07-22 ENCOUNTER — HOSPITAL ENCOUNTER (OUTPATIENT)
Dept: GASTROENTEROLOGY | Facility: AMBULATORY SURGERY CENTER | Age: 76
Discharge: HOME | End: 2021-07-22
Payer: COMMERCIAL

## 2021-07-22 ENCOUNTER — ANESTHESIA (OUTPATIENT)
Dept: GASTROENTEROLOGY | Facility: AMBULATORY SURGERY CENTER | Age: 76
End: 2021-07-22

## 2021-07-22 ENCOUNTER — ANESTHESIA EVENT (OUTPATIENT)
Dept: GASTROENTEROLOGY | Facility: AMBULATORY SURGERY CENTER | Age: 76
End: 2021-07-22

## 2021-07-22 VITALS
OXYGEN SATURATION: 99 % | HEIGHT: 60 IN | RESPIRATION RATE: 18 BRPM | BODY MASS INDEX: 35.34 KG/M2 | TEMPERATURE: 96.8 F | DIASTOLIC BLOOD PRESSURE: 73 MMHG | HEART RATE: 75 BPM | SYSTOLIC BLOOD PRESSURE: 151 MMHG | WEIGHT: 180 LBS

## 2021-07-22 DIAGNOSIS — D50.8 IRON DEFICIENCY ANEMIA SECONDARY TO INADEQUATE DIETARY IRON INTAKE: ICD-10-CM

## 2021-07-22 DIAGNOSIS — K31.84 GASTROPARESIS: ICD-10-CM

## 2021-07-22 DIAGNOSIS — K57.92 DIVERTICULITIS: ICD-10-CM

## 2021-07-22 DIAGNOSIS — K27.9 PUD (PEPTIC ULCER DISEASE): ICD-10-CM

## 2021-07-22 DIAGNOSIS — K21.9 GASTROESOPHAGEAL REFLUX DISEASE WITHOUT ESOPHAGITIS: ICD-10-CM

## 2021-07-22 PROCEDURE — 00813 ANES UPR LWR GI NDSC PX: CPT | Performed by: NURSE ANESTHETIST, CERTIFIED REGISTERED

## 2021-07-22 PROCEDURE — 43239 EGD BIOPSY SINGLE/MULTIPLE: CPT | Performed by: INTERNAL MEDICINE

## 2021-07-22 PROCEDURE — 99100 ANES PT EXTEME AGE<1 YR&>70: CPT | Performed by: NURSE ANESTHETIST, CERTIFIED REGISTERED

## 2021-07-22 PROCEDURE — 45378 DIAGNOSTIC COLONOSCOPY: CPT | Performed by: INTERNAL MEDICINE

## 2021-07-22 RX ORDER — PROPOFOL 10 MG/ML
INJECTION, EMULSION INTRAVENOUS AS NEEDED
Status: DISCONTINUED | OUTPATIENT
Start: 2021-07-22 | End: 2021-07-22

## 2021-07-22 RX ORDER — SODIUM CHLORIDE 9 MG/ML
30 INJECTION, SOLUTION INTRAVENOUS CONTINUOUS
Status: DISCONTINUED | OUTPATIENT
Start: 2021-07-22 | End: 2021-07-26 | Stop reason: HOSPADM

## 2021-07-22 RX ORDER — PANTOPRAZOLE SODIUM 40 MG/1
40 TABLET, DELAYED RELEASE ORAL DAILY
Qty: 90 TABLET | Refills: 1 | Status: ON HOLD | OUTPATIENT
Start: 2021-07-22 | End: 2022-07-18

## 2021-07-22 RX ORDER — SODIUM CHLORIDE 9 MG/ML
20 INJECTION, SOLUTION INTRAVENOUS CONTINUOUS
Status: DISCONTINUED | OUTPATIENT
Start: 2021-07-22 | End: 2021-07-26 | Stop reason: HOSPADM

## 2021-07-22 RX ADMIN — PROPOFOL 50 MG: 10 INJECTION, EMULSION INTRAVENOUS at 08:35

## 2021-07-22 RX ADMIN — SODIUM CHLORIDE: 9 INJECTION, SOLUTION INTRAVENOUS at 08:27

## 2021-07-22 RX ADMIN — PROPOFOL 100 MG: 10 INJECTION, EMULSION INTRAVENOUS at 08:29

## 2021-07-22 NOTE — ANESTHESIA POSTPROCEDURE EVALUATION
Post-Op Assessment Note    CV Status:  Stable  Pain Score: 0    Pain management: adequate     Mental Status:  Awake   Hydration Status:  Stable   PONV Controlled:  None   Airway Patency:  Patent      Post Op Vitals Reviewed: Yes      Staff: CRNA         No complications documented      BP     Temp     Pulse     Resp      SpO2

## 2021-07-22 NOTE — DISCHARGE INSTRUCTIONS
Upper Endoscopy and Colonoscopy   WHAT YOU NEED TO KNOW:   An upper endoscopy is also called an upper gastrointestinal (GI) endoscopy, or an esophagogastroduodenoscopy (EGD)  It is a procedure to examine the inside of your esophagus, stomach, and duodenum (first part of the small intestine) with a scope  You may feel bloated, gassy, or have some abdominal discomfort after your procedure  Your throat may be sore for 24 to 36 hours  You may burp or pass gas from air that is still inside your body  A colonoscopy is a procedure to examine the inside of your colon (intestine) with a scope  Polyps or tissue growths may have been removed during your colonoscopy  It is normal to feel bloated and to have some abdominal discomfort  You should be passing gas  If you have hemorrhoids or you had polyps removed, you may have a small amount of bleeding  DISCHARGE INSTRUCTIONS:   Seek care immediately if:   · You have sudden, severe abdominal pain  · You have problems swallowing  · You have a large amount of black, sticky bowel movements or blood in your bowel movements  · You have sudden trouble breathing  · You feel weak, lightheaded, or faint or your heart beats faster than normal for you  Contact your healthcare provider if:   · You have a fever and chills  · You have nausea or are vomiting  · Your abdomen is bloated or feels full and hard  · You have abdominal pain  · You have a large amount of black, sticky bowel movements or blood in your bowel movements  · You have not had a bowel movement for 3 days after your procedure  · You have rash or hives  · You have questions or concerns about your procedure  Activity:   ·       Do not lift, strain, or run for 24 hours after your procedure  ·       Rest after your procedure  You have been given medicine to relax you  Do not drive or make important decisions until the day after your procedure   Return to your normal activity as directed  ·       Relieve gas and discomfort from bloating by lying on your right side with a heating pad on your abdomen  You may need to take short walks to help the gas move out  Eat small meals until bloating is relieved  Follow up with your healthcare provider as directed: Write down your questions so you remember to ask them during your visits  If you take a blood thinner, please review the specific instructions from your endoscopist about when you should resume it  These can be found in the Recommendation and Your Medication list sections of this After Visit Summary  Peptic Ulcer   WHAT YOU NEED TO KNOW:   A peptic ulcer is an open sore in the lining of your stomach, intestine, or esophagus  Peptic ulcers have different names, depending on their location  Gastric ulcers are peptic ulcers in the stomach  Duodenal ulcers are peptic ulcers in the intestine  Esophageal ulcers are peptic ulcers in the esophagus  Peptic ulcers may be a short-term or long-term problem  DISCHARGE INSTRUCTIONS:   Call your local emergency number (911 in the [address] [place] [address],[address]) if:   · You have a fast heartbeat or fast breathing  · You are too dizzy or weak to stand up  · You vomit bright red blood  · You have bright red blood in your bowel movement  Return to the emergency department if:   · You have severe pain in your stomach  · Your vomit looks like coffee grounds  · Your bowel movements are black  · You have sudden shortness of breath  Call your doctor if:   · You have a fever  · You have diarrhea or constipation  · Your stomach pain does not go away or gets worse after you take medicine  · You have questions or concerns about your condition or care  Medicines: You may need any of the following:  · Antacids  decrease stomach acid  · Antiulcer medicines  help decrease the amount of acid made by the stomach   These help relieve pain and heal or prevent ulcers  · Antibiotics  help kill bacteria  · Take your medicine as directed  Contact your healthcare provider if you think your medicine is not helping or if you have side effects  Tell him or her if you are allergic to any medicine  Keep a list of the medicines, vitamins, and herbs you take  Include the amounts, and when and why you take them  Bring the list or the pill bottles to follow-up visits  Carry your medicine list with you in case of an emergency  Nutrition:   · Do not have carbonated drinks, alcohol, or caffeine  Caffeine is found in some coffees, teas, and sodas  It is also found in chocolate  · Do not eat foods that upset your stomach  These include spicy or acidic foods, such as oranges  · Eat small meals more often rather than big meals  An empty stomach may make your symptoms worse  Do not smoke:  Smoking increases your risk of developing ulcers  Nicotine and other chemicals in cigarettes and cigars can also cause lung damage  Ask your healthcare provider for information if you currently smoke and need help to quit  E-cigarettes or smokeless tobacco still contain nicotine  Talk to your healthcare provider before you use these products  Follow up with your doctor as directed:  Write down your questions so you remember to ask them during your visits  © Copyright [employer] 2021 Information is for End User's use only and may not be sold, redistributed or otherwise used for commercial purposes  All illustrations and images included in CareNotes® are the copyrighted property of A D A M , Inc  or Norma Mcgowan   The above information is an  only  It is not intended as medical advice for individual conditions or treatments  Talk to your doctor, nurse or pharmacist before following any medical regimen to see if it is safe and effective for you    Diet for Stomach Ulcers and Gastritis   WHAT YOU NEED TO KNOW:   A diet for stomach ulcers and gastritis is a meal plan that limits foods that irritate your stomach  Certain foods may worsen symptoms such as stomach pain, bloating, heartburn, or indigestion  DISCHARGE INSTRUCTIONS:   Foods to limit or avoid:  You may need to avoid acidic, spicy, or high-fat foods  Not all foods affect everyone the same way  You will need to learn which foods worsen your symptoms and limit those foods  The following are some foods that may worsen ulcer or gastritis symptoms:  · Beverages:      ? Whole milk and chocolate milk    ? Hot cocoa and cola    ? Any beverage with caffeine    ? Regular and decaffeinated coffee    ? Peppermint and spearmint tea    ? Green and black tea, with or without caffeine    ? Orange and grapefruit juices    ? Drinks that contain alcohol    · Spices and seasonings:      ? Black and red pepper    ? Chili powder    ? Mustard seed and nutmeg    · Other foods:      ? Dairy foods made from whole milk or cream    ? Chocolate    ? Spicy or strongly flavored cheeses, such as jalapeno or black pepper    ? Highly seasoned, high-fat meats, such as sausage, salami, lou, ham, and cold cuts    ? Hot chiles and peppers    ? Tomato products, such as tomato paste, tomato sauce, or tomato juice    Foods to include:  Eat a variety of healthy foods from all the food groups  Eat fruits, vegetables, whole grains, and fat-free or low-fat dairy foods  Whole grains include whole-wheat breads, cereals, pasta, and brown rice  Choose lean meats, poultry (chicken and turkey), fish, beans, eggs, and nuts  A healthy meal plan is low in unhealthy fats, salt, and added sugar  Healthy fats include olive oil and canola oil  Ask your dietitian for more information about a healthy diet  Other helpful guidelines:   · Do not eat right before bedtime  Stop eating at least 2 hours before bedtime  · Eat small, frequent meals  Your stomach may tolerate small, frequent meals better than large meals      © Copyright [employer] 2021 Information is for End User's use only and may not be sold, redistributed or otherwise used for commercial purposes  All illustrations and images included in CareNotes® are the copyrighted property of A D A M , Inc  or Norma Phipps  The above information is an  only  It is not intended as medical advice for individual conditions or treatments  Talk to your doctor, nurse or pharmacist before following any medical regimen to see if it is safe and effective for you  High Fiber Diet   AMBULATORY CARE:   A high-fiber diet  includes foods that have a high amount of fiber  Fiber is the part of fruits, vegetables, and grains that is not broken down by your body  Fiber keeps your bowel movements regular  Fiber can also help lower your cholesterol level, control blood sugar in people with diabetes, and relieve constipation  Fiber can also help you control your weight because it helps you feel full faster  Most adults should eat 25 to 35 grams of fiber each day  Talk to your dietitian or healthcare provider about the amount of fiber you need  Good sources of fiber:       · Foods with at least 4 grams of fiber per serving:      ? ? to ½ cup of high-fiber cereal (check the nutrition label on the box)    ? ½ cup of blackberries or raspberries    ? 4 dried prunes    ? 1 cooked artichoke    ? ½ cup of cooked legumes, such as lentils, or red, kidney, and aguilar beans    · Foods with 1 to 3 grams of fiber per serving:      ? 1 slice of whole-wheat, pumpernickel, or rye bread    ? ½ cup of cooked brown rice    ? 4 whole-wheat crackers    ? 1 cup of oatmeal    ? ½ cup of cereal with 1 to 3 grams of fiber per serving (check the nutrition label on the box)    ? 1 small piece of fruit, such as an apple, banana, pear, kiwi, or orange    ? 3 dates    ? ½ cup of canned apricots, fruit cocktail, peaches, or pears    ?  ½ cup of raw or cooked vegetables, such as carrots, cauliflower, cabbage, spinach, squash, or corn  Ways that you can increase fiber in your diet:   · Choose brown or wild rice instead of white rice  · Use whole wheat flour in recipes instead of white or all-purpose flour  · Add beans and peas to casseroles or soups  · Choose fresh fruit and vegetables with peels or skins on instead of juices  Other diet guidelines to follow:   · Add fiber to your diet slowly  You may have abdominal discomfort, bloating, and gas if you add fiber to your diet too quickly  · Drink plenty of liquids as you add fiber to your diet  You may have nausea or develop constipation if you do not drink enough water  Ask how much liquid to drink each day and which liquids are best for you  © Copyright [employer] 2021 Information is for End User's use only and may not be sold, redistributed or otherwise used for commercial purposes  All illustrations and images included in CareNotes® are the copyrighted property of A D A M , Inc  or [facility] [place] Elizabeth Mcgowan   The above information is an  only  It is not intended as medical advice for individual conditions or treatments  Talk to your doctor, nurse or pharmacist before following any medical regimen to see if it is safe and effective for you  Diverticulosis   AMBULATORY CARE:   Diverticulosis  is a condition that causes small pockets called diverticula to form in your intestine  These pockets make it difficult for bowel movements to pass through your digestive system  Signs and symptoms:  Diverticulosis usually does not cause any signs or symptoms  It may cause any of the following in some people:  · Pain or discomfort in your lower abdomen    · Abdominal bloating    · Constipation or diarrhea    Seek care immediately if:   · You have severe pain on the left side of your lower abdomen  · Your bowel movements are bright or dark red  Contact your healthcare provider if:   · You have a fever and chills  · You feel dizzy or lightheaded       · You have nausea, or you are vomiting  · You have a change in your bowel movements  · You have questions or concerns about your condition or care  Treatment:  The goal of treatment is to manage any symptoms you have and prevent other problems such as diverticulitis  Diverticulitis is swelling or infection of the diverticula  Your healthcare provider may recommend any of the following:  · Eat a variety of high-fiber foods  High-fiber foods help you have regular bowel movements  High-fiber foods include cooked beans, fruits, vegetables, and some cereals  Most adults need 25 to 35 grams of fiber each day  Your healthcare provider may recommend that you have more  Ask your healthcare provider how much fiber you need  Increase fiber slowly  You may have abdominal discomfort, bloating, and gas if you add fiber to your diet too quickly  You may need to take a fiber supplement if you are not getting enough fiber from food  · Medicines  to soften your bowel movements may be given  You may also need medicines to treat symptoms such as bloating and pain  · Drink liquids as directed  You may need to drink 2 to 3 liters (8 to 12 cups) of liquids every day  Ask your healthcare provider how much liquid to drink each day and which liquids are best for you  · Apply heat  on your abdomen for 20 to 30 minutes every 2 hours for as many days as directed  Heat helps decrease pain and muscle spasms  Help prevent diverticulitis or other symptoms: The following may help decrease your risk for diverticulitis or symptoms, such as bleeding  Talk to your provider about these or other things you can do to prevent problems that may occur with diverticulosis  · Exercise regularly  Ask your healthcare provider about the best exercise plan for you  Exercise can help you have regular bowel movements  Get 30 minutes of exercise on most days of the week  · Maintain a healthy weight  Ask your healthcare provider how much you should weigh   Ask him or her to help you create a weight loss plan if you are overweight  · Do not smoke  Nicotine and other chemicals in cigarettes increase your risk for diverticulitis  Ask your healthcare provider for information if you currently smoke and need help to quit  E-cigarettes or smokeless tobacco still contain nicotine  Talk to your healthcare provider before you use these products  · Ask your healthcare provider if it is safe to take NSAIDs  NSAIDs may increase your risk of diverticulitis  Follow up with your healthcare provider as directed:  Write down your questions so you remember to ask them during your visits  © Copyright [address] 2021 Information is for End User's use only and may not be sold, redistributed or otherwise used for commercial purposes  All illustrations and images included in CareNotes® are the copyrighted property of [employer]  or Norma Phipps  The above information is an  only  It is not intended as medical advice for individual conditions or treatments  Talk to your doctor, nurse or pharmacist before following any medical regimen to see if it is safe and effective for you

## 2021-07-22 NOTE — H&P
History and Physical - [facility]  Laury Wellingtno 64 y o  female MRN: [id number]                  HPI: Laury Wellington is a 64y o  year old female who presents for evaluation of recent diverticulitis and re-evaluation of peptic ulcers      REVIEW OF SYSTEMS: Per the HPI, and otherwise unremarkable  Historical Information   Past Medical History:   Diagnosis Date    Acid reflux     Anemia     hx of iron-deficient    Anxiety     Arthritis     Asthma     last needed inhaler last year 2020    Basal cell carcinoma     upper lip    Chronic narcotic dependence (HCC)     Chronic pain     Colon polyp     Cystocele     Diabetes mellitus (HCC)     stable    Disease of thyroid gland     hypothyroidism    Diverticulosis     Dysfunctional uterine bleeding     last assessed - [id number]    Fibromyalgia     Gastric ulcer     Gastroparesis     History of colonic polyps     last assessed - [id number]    History of gastroesophageal reflux (GERD)     Hypercholesterolemia     Hyperlipidemia     Hypertension     IBS (irritable bowel syndrome)     Post laminectomy syndrome     Seasonal allergies     Spinal stenosis      Past Surgical History:   Procedure Laterality Date    APPENDECTOMY      BACK SURGERY      BREAST CYST ASPIRATION Left     pt unsure what exactly was removed    CHOLECYSTECTOMY      COLONOSCOPY      ESOPHAGOGASTRODUODENOSCOPY N/A 9/28/2016    Procedure: ESOPHAGOGASTRODUODENOSCOPY (EGD); Surgeon: Cindy Karimi MD;  [facility];   Service:     HERNIA REPAIR      HYSTERECTOMY      TTAH-BSO age 25   [place] LAMINECTOMY      LUMBAR LAMINECTOMY      OOPHORECTOMY      age 25   [place] HI ARTHRODESIS POSTERIOR/POSTEROLATERAL THORACIC N/A 6/4/2018    Procedure: Reopening of lumbar incision for T12-L5 posterior instrumented fixation and fusion and T12-L4 posterior decompression;  Surgeon: Anna Ryan MD;  [facility];  Service: Neurosurgery    HI COLONOSCOPY FLX DX W/COLLJ SPEC WHEN PFRMD N/A 3/2/2016    Procedure: EGD AND COLONOSCOPY;  Surgeon: Bob Pratt MD;  [facility]; Service: Gastroenterology    IN DILATE ESOPHAGUS N/A 2016    Procedure: DILATATION ESOPHAGEAL;  Surgeon: Bob Pratt MD;  [facility]; Service: Gastroenterology    IN ESOPHAGOGASTRODUODENOSCOPY TRANSORAL DIAGNOSTIC N/A 2016    Procedure: ESOPHAGOGASTRODUODENOSCOPY (EGD); Surgeon: Bob Pratt MD;  [facility];   Service: Gastroenterology    IN IMPLANT SPINAL NEUROSTIM/ Left 2018    Procedure: removal of left buttock implantable pulse generator and placement of new  implantable pulse generator;  Surgeon: Laina Rocha MD;  [facility];  Service: Neurosurgery     Social History   Social History     Substance and Sexual Activity   Alcohol Use Not Currently    Comment: Denied history of alcohol use     Social History     Substance and Sexual Activity   Drug Use No    Comment: Denied history of drug use     Social History     Tobacco Use   Smoking Status Former Smoker    Types: Cigarettes    Quit date: 1970    Years since quittin 5   Smokeless Tobacco Never Used   Tobacco Comment    Denied history of current ever day smoker, Former smoker and Never smoker all documented in [employer]     Family History   Problem Relation Age of Onset    Lung cancer Mother 55    Pulmonary embolism Father     Stroke Maternal Grandmother     Heart attack Maternal Grandfather     No Known Problems Paternal Grandmother     No Known Problems Paternal Grandfather     Diabetes Family         Diabetes mellitus    Hypertension Family     Stroke Family         Stroke complications    No Known Problems Daughter     No Known Problems Daughter     No Known Problems Sister     No Known Problems Maternal Aunt        Meds/Allergies       Current Outpatient Medications:     baclofen 10 mg tablet    gabapentin (NEURONTIN) 300 mg capsule    glucose blood test strip    latanoprost (XALATAN) 0 005 % ophthalmic solution    levothyroxine 25 mcg tablet    lisinopril (ZESTRIL) 20 mg tablet    meclizine (ANTIVERT) 25 mg tablet    metFORMIN (GLUCOPHAGE) 1000 MG tablet    metoclopramide (REGLAN) 10 mg tablet    Milnacipran HCl (Savella) 100 MG TABS    nitrofurantoin (MACRODANTIN) 50 mg capsule    oxybutynin (DITROPAN) 5 mg tablet    oxyCODONE-acetaminophen (PERCOCET)  mg per tablet    pantoprazole (PROTONIX) 40 mg tablet    pravastatin (PRAVACHOL) 10 mg tablet    zolpidem (AMBIEN) 5 mg tablet    albuterol (PROVENTIL HFA,VENTOLIN HFA) 90 mcg/act inhaler    ondansetron (ZOFRAN) 8 mg tablet    Current Facility-Administered Medications:     sodium chloride 0 9 % infusion, 30 mL/hr, Intravenous, Continuous    Allergies   Allergen Reactions    Penicillins Anaphylaxis, Hives and Other (See Comments)     Other reaction(s): Unknown Reaction    Sulfa Antibiotics Anaphylaxis and Other (See Comments)     Other reaction(s): Unknown Reaction    Aspartame - Food Allergy Other (See Comments) and Hypertension     Slurred speech, weakness, stroke sx    Iodinated Diagnostic Agents Hives     Pt has taken prep prior for contrast and has not had any break through reaction    Keflex [Cephalexin] Hives       Objective     BP (!) 192/97   Pulse 102   Temp (!) 96 8 °F (36 °C) (Temporal)   Resp 16   Ht 5' (1 524 m)   Wt 81 6 kg (180 lb)   LMP  (LMP Unknown)   SpO2 96%   BMI 35 15 kg/m²       PHYSICAL EXAM    Gen: NAD  Head: NCAT  CV: RRR  CHEST: Clear  ABD: soft, NT/ND  EXT: no edema      ASSESSMENT/PLAN:  This is a 64y o  year old female here for colonoscopy an EGD for evaluation of recent diverticulitis re-evaluation of prior ulcers, and she is stable and optimized for her procedure

## 2021-07-22 NOTE — ANESTHESIA PREPROCEDURE EVALUATION
Procedure:  COLONOSCOPY  EGD    Relevant Problems   CARDIO   (+) High cholesterol   (+) Hypertension      ENDO   (+) Hypothyroidism   (+) Type 2 diabetes mellitus with hyperglycemia, without long-term current use of insulin (HCC)      GI/HEPATIC   (+) PUD (peptic ulcer disease)      HEMATOLOGY   (+) Iron deficiency anemia secondary to inadequate dietary iron intake      MUSCULOSKELETAL   (+) Failed back surgical syndrome      NEURO/PSYCH   (+) Anxiety   (+) Chronic pain disorder      PULMONARY   (+) Mild intermittent asthma without complication        Physical Exam    Airway    Mallampati score: III  TM Distance: >3 FB  Neck ROM: full     Dental   upper dentures and lower dentures,     Cardiovascular  Cardiovascular exam normal    Pulmonary  Pulmonary exam normal     Other Findings        Anesthesia Plan  ASA Score- 3     Anesthesia Type- IV sedation with anesthesia with ASA Monitors  Additional Monitors:   Airway Plan:           Plan Factors-Exercise tolerance (METS): <4 METS  Chart reviewed  Existing labs reviewed  Patient summary reviewed  Patient is not a current smoker  Patient not instructed to abstain from smoking on day of procedure  Patient did not smoke on day of surgery  Induction-     Postoperative Plan-     Informed Consent- Anesthetic plan and risks discussed with patient  I personally reviewed this patient with the CRNA  Discussed and agreed on the Anesthesia Plan with the CRNA  Aurea Kc

## 2021-07-23 DIAGNOSIS — F51.01 PRIMARY INSOMNIA: ICD-10-CM

## 2021-07-23 DIAGNOSIS — M96.1 POST LAMINECTOMY SYNDROME: ICD-10-CM

## 2021-07-23 DIAGNOSIS — M54.50 LUMBAR BACK PAIN: ICD-10-CM

## 2021-07-23 RX ORDER — ZOLPIDEM TARTRATE 5 MG/1
5 TABLET ORAL
Qty: 30 TABLET | Refills: 0 | Status: SHIPPED | OUTPATIENT
Start: 2021-07-23 | End: 2021-08-23 | Stop reason: SDUPTHER

## 2021-07-23 RX ORDER — OXYCODONE AND ACETAMINOPHEN 10; 325 MG/1; MG/1
1 TABLET ORAL EVERY 6 HOURS PRN
Qty: 100 TABLET | Refills: 0 | Status: SHIPPED | OUTPATIENT
Start: 2021-07-23 | End: 2021-08-23 | Stop reason: SDUPTHER

## 2021-07-31 DIAGNOSIS — N39.0 URINARY TRACT INFECTION WITHOUT HEMATURIA, SITE UNSPECIFIED: ICD-10-CM

## 2021-07-31 RX ORDER — CIPROFLOXACIN 500 MG/1
500 TABLET, FILM COATED ORAL EVERY 12 HOURS SCHEDULED
Qty: 20 TABLET | Refills: 0 | Status: SHIPPED | OUTPATIENT
Start: 2021-07-31 | End: 2021-08-10

## 2021-08-09 DIAGNOSIS — E03.9 HYPOTHYROIDISM, UNSPECIFIED TYPE: ICD-10-CM

## 2021-08-09 DIAGNOSIS — M54.50 LUMBAR BACK PAIN: ICD-10-CM

## 2021-08-09 RX ORDER — LEVOTHYROXINE SODIUM 0.03 MG/1
37.5 TABLET ORAL DAILY
Qty: 45 TABLET | Refills: 3 | Status: SHIPPED | OUTPATIENT
Start: 2021-08-09 | End: 2022-05-11 | Stop reason: SDUPTHER

## 2021-08-09 RX ORDER — BACLOFEN 10 MG/1
10 TABLET ORAL 3 TIMES DAILY PRN
Qty: 30 TABLET | Refills: 3 | Status: SHIPPED | OUTPATIENT
Start: 2021-08-09 | End: 2022-01-03 | Stop reason: SDUPTHER

## 2021-08-23 DIAGNOSIS — M54.50 LUMBAR BACK PAIN: ICD-10-CM

## 2021-08-23 DIAGNOSIS — F51.01 PRIMARY INSOMNIA: ICD-10-CM

## 2021-08-23 DIAGNOSIS — M96.1 POST LAMINECTOMY SYNDROME: ICD-10-CM

## 2021-08-23 RX ORDER — OXYCODONE AND ACETAMINOPHEN 10; 325 MG/1; MG/1
1 TABLET ORAL EVERY 6 HOURS PRN
Qty: 100 TABLET | Refills: 0 | Status: SHIPPED | OUTPATIENT
Start: 2021-08-23 | End: 2021-09-22 | Stop reason: SDUPTHER

## 2021-08-23 RX ORDER — ZOLPIDEM TARTRATE 5 MG/1
5 TABLET ORAL
Qty: 30 TABLET | Refills: 3 | Status: SHIPPED | OUTPATIENT
Start: 2021-08-23 | End: 2021-12-21 | Stop reason: SDUPTHER

## 2021-09-07 DIAGNOSIS — E13.9 DIABETES 1.5, MANAGED AS TYPE 2 (HCC): ICD-10-CM

## 2021-09-10 DIAGNOSIS — M48.061 SPINAL STENOSIS OF LUMBAR REGION, UNSPECIFIED WHETHER NEUROGENIC CLAUDICATION PRESENT: ICD-10-CM

## 2021-09-10 DIAGNOSIS — N32.81 OVERACTIVE BLADDER: ICD-10-CM

## 2021-09-10 RX ORDER — OXYBUTYNIN CHLORIDE 5 MG/1
5 TABLET ORAL 3 TIMES DAILY
Qty: 90 TABLET | Refills: 3 | Status: SHIPPED | OUTPATIENT
Start: 2021-09-10 | End: 2021-11-08 | Stop reason: ALTCHOICE

## 2021-09-10 RX ORDER — NITROFURANTOIN MACROCRYSTALS 50 MG/1
50 CAPSULE ORAL
Qty: 30 CAPSULE | Refills: 3 | Status: SHIPPED | OUTPATIENT
Start: 2021-09-10 | End: 2022-04-25 | Stop reason: HOSPADM

## 2021-09-13 DIAGNOSIS — M79.7 FIBROMYALGIA: ICD-10-CM

## 2021-09-13 RX ORDER — MILNACIPRAN HYDROCHLORIDE 100 MG/1
1 TABLET, FILM COATED ORAL DAILY
Qty: 30 TABLET | Refills: 3 | Status: SHIPPED | OUTPATIENT
Start: 2021-09-13

## 2021-09-22 DIAGNOSIS — M96.1 POST LAMINECTOMY SYNDROME: ICD-10-CM

## 2021-09-22 DIAGNOSIS — M54.50 LUMBAR BACK PAIN: ICD-10-CM

## 2021-09-22 RX ORDER — OXYCODONE AND ACETAMINOPHEN 10; 325 MG/1; MG/1
1 TABLET ORAL EVERY 6 HOURS PRN
Qty: 100 TABLET | Refills: 0 | Status: SHIPPED | OUTPATIENT
Start: 2021-09-22 | End: 2021-10-22 | Stop reason: SDUPTHER

## 2021-10-16 DIAGNOSIS — N39.0 URINARY TRACT INFECTION WITHOUT HEMATURIA, SITE UNSPECIFIED: Primary | ICD-10-CM

## 2021-10-16 RX ORDER — LEVOFLOXACIN 500 MG/1
500 TABLET, FILM COATED ORAL EVERY 24 HOURS
Qty: 10 TABLET | Refills: 0 | Status: SHIPPED | OUTPATIENT
Start: 2021-10-16 | End: 2021-10-26

## 2021-10-18 DIAGNOSIS — M48.062 LUMBAR STENOSIS WITH NEUROGENIC CLAUDICATION: ICD-10-CM

## 2021-10-18 RX ORDER — GABAPENTIN 300 MG/1
CAPSULE ORAL
Qty: 270 CAPSULE | Refills: 0 | Status: SHIPPED | OUTPATIENT
Start: 2021-10-18 | End: 2022-01-27 | Stop reason: SDUPTHER

## 2021-10-30 ENCOUNTER — IMMUNIZATIONS (OUTPATIENT)
Dept: FAMILY MEDICINE CLINIC | Facility: HOSPITAL | Age: 76
End: 2021-10-30

## 2021-10-30 DIAGNOSIS — Z23 ENCOUNTER FOR IMMUNIZATION: Primary | ICD-10-CM

## 2021-10-30 PROCEDURE — 0001A COVID-19 PFIZER VACC 0.3 ML: CPT

## 2021-10-30 PROCEDURE — 91300 COVID-19 PFIZER VACC 0.3 ML: CPT

## 2021-11-23 DIAGNOSIS — M54.50 LUMBAR BACK PAIN: ICD-10-CM

## 2021-11-23 DIAGNOSIS — M96.1 POST LAMINECTOMY SYNDROME: ICD-10-CM

## 2021-11-23 RX ORDER — OXYCODONE AND ACETAMINOPHEN 10; 325 MG/1; MG/1
1 TABLET ORAL EVERY 6 HOURS PRN
Qty: 100 TABLET | Refills: 0 | Status: SHIPPED | OUTPATIENT
Start: 2021-11-23 | End: 2021-12-21 | Stop reason: SDUPTHER

## 2021-12-05 DIAGNOSIS — E13.9 DIABETES 1.5, MANAGED AS TYPE 2 (HCC): ICD-10-CM

## 2021-12-15 ENCOUNTER — IMMUNIZATIONS (OUTPATIENT)
Dept: FAMILY MEDICINE CLINIC | Facility: CLINIC | Age: 76
End: 2021-12-15
Payer: COMMERCIAL

## 2021-12-15 DIAGNOSIS — Z23 ENCOUNTER FOR IMMUNIZATION: Primary | ICD-10-CM

## 2021-12-15 PROCEDURE — 90662 IIV NO PRSV INCREASED AG IM: CPT | Performed by: NURSE PRACTITIONER

## 2021-12-15 PROCEDURE — G0008 ADMIN INFLUENZA VIRUS VAC: HCPCS | Performed by: NURSE PRACTITIONER

## 2021-12-16 DIAGNOSIS — N39.0 URINARY TRACT INFECTION WITHOUT HEMATURIA, SITE UNSPECIFIED: Primary | ICD-10-CM

## 2021-12-16 RX ORDER — LEVOFLOXACIN 500 MG/1
500 TABLET, FILM COATED ORAL EVERY 24 HOURS
Qty: 10 TABLET | Refills: 0 | Status: SHIPPED | OUTPATIENT
Start: 2021-12-16 | End: 2021-12-26

## 2022-01-09 DIAGNOSIS — N32.81 OVERACTIVE BLADDER: ICD-10-CM

## 2022-01-10 RX ORDER — OXYBUTYNIN CHLORIDE 5 MG/1
TABLET ORAL
Qty: 90 TABLET | Refills: 0 | Status: SHIPPED | OUTPATIENT
Start: 2022-01-10 | End: 2022-02-24 | Stop reason: SDUPTHER

## 2022-01-31 DIAGNOSIS — R30.0 DYSURIA: Primary | ICD-10-CM

## 2022-01-31 PROCEDURE — 87086 URINE CULTURE/COLONY COUNT: CPT | Performed by: NURSE PRACTITIONER

## 2022-02-01 LAB — BACTERIA UR CULT: NORMAL

## 2022-02-16 DIAGNOSIS — M54.50 LUMBAR BACK PAIN: ICD-10-CM

## 2022-02-16 RX ORDER — BACLOFEN 10 MG/1
TABLET ORAL
Qty: 30 TABLET | Refills: 0 | Status: SHIPPED | OUTPATIENT
Start: 2022-02-16 | End: 2022-02-28

## 2022-02-20 DIAGNOSIS — M96.1 POST LAMINECTOMY SYNDROME: ICD-10-CM

## 2022-02-20 DIAGNOSIS — M54.50 LUMBAR BACK PAIN: ICD-10-CM

## 2022-02-20 RX ORDER — OXYCODONE AND ACETAMINOPHEN 10; 325 MG/1; MG/1
1 TABLET ORAL EVERY 6 HOURS PRN
Qty: 100 TABLET | Refills: 0 | Status: SHIPPED | OUTPATIENT
Start: 2022-02-20 | End: 2022-03-21 | Stop reason: SDUPTHER

## 2022-02-26 DIAGNOSIS — M54.50 LUMBAR BACK PAIN: ICD-10-CM

## 2022-02-28 RX ORDER — BACLOFEN 10 MG/1
TABLET ORAL
Qty: 30 TABLET | Refills: 0 | Status: SHIPPED | OUTPATIENT
Start: 2022-02-28 | End: 2022-06-08 | Stop reason: SDUPTHER

## 2022-03-04 DIAGNOSIS — E78.00 HYPERCHOLESTEROLEMIA: ICD-10-CM

## 2022-03-04 RX ORDER — PRAVASTATIN SODIUM 10 MG
10 TABLET ORAL
Qty: 90 TABLET | Refills: 5 | Status: SHIPPED | OUTPATIENT
Start: 2022-03-04 | End: 2022-04-25 | Stop reason: HOSPADM

## 2022-03-24 DIAGNOSIS — J11.1 INFLUENZA: Primary | ICD-10-CM

## 2022-03-24 RX ORDER — OSELTAMIVIR PHOSPHATE 75 MG/1
75 CAPSULE ORAL EVERY 12 HOURS SCHEDULED
Qty: 10 CAPSULE | Refills: 0 | Status: SHIPPED | OUTPATIENT
Start: 2022-03-24 | End: 2022-03-29

## 2022-03-28 ENCOUNTER — OFFICE VISIT (OUTPATIENT)
Dept: UROLOGY | Facility: CLINIC | Age: 77
End: 2022-03-28
Payer: COMMERCIAL

## 2022-03-28 VITALS
HEART RATE: 92 BPM | BODY MASS INDEX: 35.53 KG/M2 | SYSTOLIC BLOOD PRESSURE: 146 MMHG | DIASTOLIC BLOOD PRESSURE: 82 MMHG | HEIGHT: 60 IN | RESPIRATION RATE: 18 BRPM | WEIGHT: 181 LBS | OXYGEN SATURATION: 96 %

## 2022-03-28 DIAGNOSIS — R32 URINARY INCONTINENCE, UNSPECIFIED TYPE: ICD-10-CM

## 2022-03-28 DIAGNOSIS — N39.0 RECURRENT UTI: Primary | ICD-10-CM

## 2022-03-28 LAB — POST-VOID RESIDUAL VOLUME, ML POC: 0 ML

## 2022-03-28 PROCEDURE — 1036F TOBACCO NON-USER: CPT | Performed by: UROLOGY

## 2022-03-28 PROCEDURE — 1160F RVW MEDS BY RX/DR IN RCRD: CPT | Performed by: UROLOGY

## 2022-03-28 PROCEDURE — 99204 OFFICE O/P NEW MOD 45 MIN: CPT | Performed by: UROLOGY

## 2022-03-28 PROCEDURE — 51798 US URINE CAPACITY MEASURE: CPT | Performed by: UROLOGY

## 2022-03-28 RX ORDER — ESTRADIOL 0.1 MG/G
1 CREAM VAGINAL 3 TIMES WEEKLY
Qty: 42.5 G | Refills: 3 | Status: SHIPPED | OUTPATIENT
Start: 2022-03-28 | End: 2022-06-30 | Stop reason: ALTCHOICE

## 2022-03-28 RX ORDER — TROSPIUM CHLORIDE 20 MG/1
20 TABLET, FILM COATED ORAL 2 TIMES DAILY
Qty: 90 TABLET | Refills: 3 | Status: SHIPPED | OUTPATIENT
Start: 2022-03-28

## 2022-03-28 NOTE — PROGRESS NOTES
1  Recurrent UTI  estradiol (ESTRACE VAGINAL) 0 1 mg/g vaginal cream   2  Urinary incontinence, unspecified type  Ambulatory Referral to Urology    POCT Measure PVR    trospium chloride (SANCTURA) 20 mg tablet       Assessment and plan:     1  Mixed stress and urge incontinence  - not a candidate for beta 3 agonist given her baseline hypertension in the office today  - will stop oxybutynin and trial of sanctura  - patient declines pelvic floor PT at this time as she does not currently drive    2  Recurrent UTIs  - start estrace  - stop nitrofurantoin prophylaxis after 30 days on topical estrogen supplementation  - contact us with any symptoms of breakthrough infections       Jose Ayala PA-C      Chief Complaint     Urinary incontinence    History of Present Illness     Alfonso De La Cruz is a 76 y o  female presenting today as a new patient for incontinence     Reports symptoms present for the past 2 years and worsening  Wearing 1 pad in the daytime and 1 pad at night  Denies any previous  surgical manipulation  On suppressive nitrofurantoin prophylaxis by PCP  Still having some breakthrough infections per patient report however I do not see any positive cultures in her chart  CT abdomen/pelvis 5/10/21 negative for urologic abnormality    Medical comorbidities include peptic ulcer disease, hypothyroidism, asthma, s/p lumbar fusion, post laminectomy syndrome, morbid obesity, anxiety  In chronic opoids  Just voided prior to appointment  PVR 0mL    Laboratory     Lab Results   Component Value Date    CREATININE 0 96 05/12/2021       Review of Systems     Review of Systems   Constitutional: Negative for activity change, appetite change, chills, diaphoresis, fatigue, fever and unexpected weight change  Respiratory: Negative for chest tightness and shortness of breath  Cardiovascular: Negative for chest pain, palpitations and leg swelling     Gastrointestinal: Negative for abdominal distention, abdominal pain, constipation, diarrhea, nausea and vomiting  Genitourinary: Positive for urgency  Negative for decreased urine volume, difficulty urinating, dysuria, enuresis, flank pain, frequency, genital sores and hematuria  Stress urinary incontinence, urge urinary incontinence, recurrent UTI   Musculoskeletal: Negative for back pain, gait problem and myalgias  Skin: Negative for color change, pallor, rash and wound  Psychiatric/Behavioral: Negative for behavioral problems  The patient is not nervous/anxious  Allergies     Allergies   Allergen Reactions    Penicillins Anaphylaxis, Hives and Other (See Comments)     Other reaction(s): Unknown Reaction    Sulfa Antibiotics Anaphylaxis and Other (See Comments)     Other reaction(s): Unknown Reaction    Aspartame - Food Allergy Other (See Comments) and Hypertension     Slurred speech, weakness, stroke sx    Iodinated Diagnostic Agents Hives     Pt has taken prep prior for contrast and has not had any break through reaction    Keflex [Cephalexin] Hives       Physical Exam     Physical Exam  Constitutional:       General: She is not in acute distress  Appearance: Normal appearance  She is obese  She is not ill-appearing, toxic-appearing or diaphoretic  HENT:      Head: Normocephalic and atraumatic  Eyes:      General:         Right eye: No discharge  Left eye: No discharge  Conjunctiva/sclera: Conjunctivae normal    Pulmonary:      Effort: Pulmonary effort is normal  No respiratory distress  Musculoskeletal:      Comments: Ambulates with cane assistance  Slow gait   Skin:     General: Skin is warm and dry  Coloration: Skin is not jaundiced or pale  Neurological:      General: No focal deficit present  Mental Status: She is alert and oriented to person, place, and time  Psychiatric:         Mood and Affect: Mood normal          Behavior: Behavior normal          Thought Content:  Thought content normal            Vital Signs     Vitals:    03/28/22 1343   BP: 146/82   Pulse: 92   Resp: 18   SpO2: 96%   Weight: 82 1 kg (181 lb)   Height: 5' (1 524 m)         Current Medications       Current Outpatient Medications:     albuterol (PROVENTIL HFA,VENTOLIN HFA) 90 mcg/act inhaler, Inhale 2 puffs every 6 (six) hours as needed for wheezing or shortness of breath, Disp: 1 Inhaler, Rfl: 5    ALPRAZolam (XANAX) 0 25 mg tablet, Take 1 tablet (0 25 mg total) by mouth 3 (three) times a day as needed for anxiety, Disp: 30 tablet, Rfl: 0    baclofen 10 mg tablet, TAKE ONE TABLET BY MOUTH THREE TIMES DAILY AS NEEDED FOR MUSCLE SPASM, Disp: 30 tablet, Rfl: 0    dicyclomine (BENTYL) 20 mg tablet, Take 1 tablet (20 mg total) by mouth every 6 (six) hours as needed (bowel spasm), Disp: 120 tablet, Rfl: 1    gabapentin (NEURONTIN) 300 mg capsule, TAKE ONE CAPSULE BY MOUTH DAILY IN MORNING AND TWO CAPSULES BY MOUTH AT BEDTIME, Disp: 270 capsule, Rfl: 0    glucose blood test strip, Bid testing, Disp: 100 each, Rfl: 6    meclizine (ANTIVERT) 25 mg tablet, Take 1 tablet (25 mg total) by mouth 4 (four) times a day as needed for dizziness, Disp: 60 tablet, Rfl: 3    metFORMIN (GLUCOPHAGE) 1000 MG tablet, Take 1 tablet (1,000 mg total) by mouth 2 (two) times a day with meals, Disp: 180 tablet, Rfl: 3    metoclopramide (REGLAN) 10 mg tablet, Take 1 tablet (10 mg total) by mouth 4 (four) times a day, Disp: 120 tablet, Rfl: 3    Milnacipran HCl (Savella) 100 MG TABS, Take 1 tablet (100 mg total) by mouth daily, Disp: 30 tablet, Rfl: 3    nitrofurantoin (MACRODANTIN) 50 mg capsule, Take 1 capsule (50 mg total) by mouth daily at bedtime, Disp: 30 capsule, Rfl: 3    ondansetron (ZOFRAN) 8 mg tablet, Take 1 tablet (8 mg total) by mouth every 8 (eight) hours as needed for nausea or vomiting, Disp: 20 tablet, Rfl: 3    oseltamivir (TAMIFLU) 75 mg capsule, Take 1 capsule (75 mg total) by mouth every 12 (twelve) hours for 5 days, Disp: 10 capsule, Rfl: 0    oxybutynin (DITROPAN) 5 mg tablet, Take 1 tablet (5 mg total) by mouth 3 (three) times a day, Disp: 90 tablet, Rfl: 0    oxyCODONE-acetaminophen (PERCOCET)  mg per tablet, Take 1 tablet by mouth every 6 (six) hours as needed for severe pain Max Daily Amount: 4 tablets, Disp: 100 tablet, Rfl: 0    phenazopyridine (PYRIDIUM) 100 mg tablet, Take 1 tablet (100 mg total) by mouth 3 (three) times a day as needed for bladder spasms, Disp: 30 tablet, Rfl: 0    pravastatin (PRAVACHOL) 10 mg tablet, Take 1 tablet (10 mg total) by mouth daily at bedtime, Disp: 90 tablet, Rfl: 5    zolpidem (AMBIEN) 5 mg tablet, Take 1 tablet (5 mg total) by mouth daily at bedtime as needed for sleep, Disp: 30 tablet, Rfl: 3    estradiol (ESTRACE VAGINAL) 0 1 mg/g vaginal cream, Insert 1 g into the vagina 3 (three) times a week, Disp: 42 5 g, Rfl: 3    latanoprost (XALATAN) 0 005 % ophthalmic solution, Administer 1 drop to both eyes daily at bedtime, Disp: 2 5 mL, Rfl: 3    levothyroxine 25 mcg tablet, Take 1 5 tablets (37 5 mcg total) by mouth daily, Disp: 45 tablet, Rfl: 3    lisinopril (ZESTRIL) 20 mg tablet, Take 1 tablet (20 mg total) by mouth daily, Disp: 30 tablet, Rfl: 3    pantoprazole (PROTONIX) 40 mg tablet, Take 1 tablet (40 mg total) by mouth daily, Disp: 90 tablet, Rfl: 1    trospium chloride (SANCTURA) 20 mg tablet, Take 1 tablet (20 mg total) by mouth 2 (two) times a day, Disp: 90 tablet, Rfl: 3      Active Problems     Patient Active Problem List   Diagnosis    Pain in right wrist    Failed back surgical syndrome    Status post lumbar spinal fusion    Degenerative lumbar spinal stenosis    Type 2 diabetes mellitus with hyperglycemia, without long-term current use of insulin (HCC)    Chronic pain disorder    Dyspepsia    Glaucoma    Hypertension    Postlaminectomy syndrome, lumbar    Iron deficiency anemia secondary to inadequate dietary iron intake    Anxiety    High cholesterol    Hypothyroidism    Overactive bladder    S/P insertion of spinal cord stimulator    Mild intermittent asthma without complication    Melena    Elevated troponin    Right knee pain    Right lower quadrant abdominal pain    PUD (peptic ulcer disease)    Hypoalbuminemia due to protein-calorie malnutrition (HCC)    Obesity, morbid ([facility] )         Past Medical History     Past Medical History:   Diagnosis Date    Acid reflux     Anemia     hx of iron-deficient    Anxiety     Arthritis     Asthma     last needed inhaler last year 2020    Basal cell carcinoma     upper lip    Chronic narcotic dependence (HCC)     Chronic pain     Colon polyp     Cystocele     Diabetes mellitus ([facility] )     stable    Disease of thyroid gland     hypothyroidism    Diverticulosis     Dysfunctional uterine bleeding     last assessed - [id number]    Fibromyalgia     Gastric ulcer     Gastroparesis     History of colonic polyps     last assessed - [id number]    History of gastroesophageal reflux (GERD)     Hypercholesterolemia     Hyperlipidemia     Hypertension     IBS (irritable bowel syndrome)     Post laminectomy syndrome     Seasonal allergies     Spinal stenosis          Surgical History     Past Surgical History:   Procedure Laterality Date    APPENDECTOMY      BACK SURGERY      BREAST CYST ASPIRATION Left     pt unsure what exactly was removed    CHOLECYSTECTOMY      COLONOSCOPY      ESOPHAGOGASTRODUODENOSCOPY N/A 9/28/2016    Procedure: ESOPHAGOGASTRODUODENOSCOPY (EGD); Surgeon: Stevie Lewis MD;  [facility];   Service:     HERNIA REPAIR      HYSTERECTOMY      TTAH-BSO age 25   Sally Gee LAMINECTOMY      LUMBAR LAMINECTOMY      OOPHORECTOMY      age 25   Sally Mike DE ARTHRODESIS POSTERIOR/POSTEROLATERAL THORACIC N/A 6/4/2018    Procedure: Reopening of lumbar incision for T12-L5 posterior instrumented fixation and fusion and T12-L4 posterior decompression;  Surgeon: Octavia Harrington MD; [facility];  Service: Neurosurgery    MS COLONOSCOPY FLX DX W/COLLJ SPEC WHEN PFRMD N/A 3/2/2016    Procedure: EGD AND COLONOSCOPY;  Surgeon: Lois Avalos MD;  [facility]; Service: Gastroenterology    MS DILATE ESOPHAGUS N/A 9/28/2016    Procedure: DILATATION ESOPHAGEAL;  Surgeon: Lois Avalos MD;  [facility]; Service: Gastroenterology    MS ESOPHAGOGASTRODUODENOSCOPY TRANSORAL DIAGNOSTIC N/A 7/18/2016    Procedure: ESOPHAGOGASTRODUODENOSCOPY (EGD); Surgeon: Lois Avalos MD;  [facility];   Service: Gastroenterology    MS IMPLANT SPINAL NEUROSTIM/ Left 6/4/2018    Procedure: removal of left buttock implantable pulse generator and placement of new  implantable pulse generator;  Surgeon: Delia Joseph MD;  [facility];  Service: Neurosurgery         Family History     Family History   Problem Relation Age of Onset    Lung cancer Mother 55    Pulmonary embolism Father     Stroke Maternal Grandmother     Heart attack Maternal Grandfather     No Known Problems Paternal Grandmother     No Known Problems Paternal Grandfather     Diabetes Family         Diabetes mellitus    Hypertension Family     Stroke Family         Stroke complications    No Known Problems Daughter     No Known Problems Daughter     No Known Problems Sister     No Known Problems Maternal Aunt          Social History     Social History       Radiology

## 2022-04-02 DIAGNOSIS — J01.91 ACUTE RECURRENT SINUSITIS, UNSPECIFIED LOCATION: Primary | ICD-10-CM

## 2022-04-02 RX ORDER — LEVOFLOXACIN 500 MG/1
500 TABLET, FILM COATED ORAL EVERY 24 HOURS
Qty: 10 TABLET | Refills: 0 | Status: SHIPPED | OUTPATIENT
Start: 2022-04-02 | End: 2022-04-12

## 2022-04-20 NOTE — INTERVAL H&P NOTE
H&P reviewed  After examining the patient I find no changes in the patients condition since the H&P had been written  Patient personally seen and examined  Neurological examination unchanged compared to last office/progress note, with the following exceptions:    Neurological examination is unchanged  Patient continues to have difficulties ambulating secondary to progressive weakness in both lower extremities  Pain in lower extremities is variable  Patient's anemia was discussed with her daughter  Anticipate need for PRBC during the case  They are in agreement  Post operative instructions and medications have been reviewed with the patient and family  Assessment and Plan:    All questions have been answered to the patient and family satisfaction  Plan to proceed with reopening of lumbar incision for T11-L5 posterior instrumented fixation fusion and T12-L4 posterior decompression with possible extension to additional levels and removal of left buttock implantable pulse generator and placement of a new implantable pulse generator  They are in agreement with proceeding  Tdap; 17-Jun-2019 08:56; Priyanka Easley (RN); [employer]; [id number] (Exp. Date: 04-Apr-2021); IntraMuscular; Deltoid Left.; 0.5 milliLiter(s); VIS (VIS Published: 09-May-2013, VIS Presented: 17-Jun-2019);

## 2022-04-22 ENCOUNTER — APPOINTMENT (EMERGENCY)
Dept: CT IMAGING | Facility: HOSPITAL | Age: 77
DRG: 093 | End: 2022-04-22
Payer: COMMERCIAL

## 2022-04-22 ENCOUNTER — HOSPITAL ENCOUNTER (INPATIENT)
Facility: HOSPITAL | Age: 77
LOS: 2 days | Discharge: HOME HEALTH | DRG: 093 | End: 2022-04-25
Attending: EMERGENCY MEDICINE | Admitting: INTERNAL MEDICINE
Payer: COMMERCIAL

## 2022-04-22 DIAGNOSIS — E78.00 HIGH CHOLESTEROL: ICD-10-CM

## 2022-04-22 DIAGNOSIS — I10 PRIMARY HYPERTENSION: ICD-10-CM

## 2022-04-22 DIAGNOSIS — R29.90 STROKE-LIKE SYMPTOMS: ICD-10-CM

## 2022-04-22 DIAGNOSIS — I66.9 CEREBRAL THROMBOSIS: ICD-10-CM

## 2022-04-22 DIAGNOSIS — I63.9 STROKE (HCC): Primary | ICD-10-CM

## 2022-04-22 LAB
2HR DELTA HS TROPONIN: 2 NG/L
ANION GAP SERPL CALCULATED.3IONS-SCNC: 9 MMOL/L (ref 4–13)
APTT PPP: 30 SECONDS (ref 23–37)
BUN SERPL-MCNC: 17 MG/DL (ref 5–25)
CALCIUM SERPL-MCNC: 8.9 MG/DL (ref 8.3–10.1)
CARDIAC TROPONIN I PNL SERPL HS: 13 NG/L
CARDIAC TROPONIN I PNL SERPL HS: 15 NG/L
CHLORIDE SERPL-SCNC: 102 MMOL/L (ref 100–108)
CO2 SERPL-SCNC: 28 MMOL/L (ref 21–32)
CREAT SERPL-MCNC: 1.37 MG/DL (ref 0.6–1.3)
ERYTHROCYTE [DISTWIDTH] IN BLOOD BY AUTOMATED COUNT: 13.8 % (ref 11.6–15.1)
FLUAV RNA RESP QL NAA+PROBE: NEGATIVE
FLUBV RNA RESP QL NAA+PROBE: NEGATIVE
GFR SERPL CREATININE-BSD FRML MDRD: 36 ML/MIN/1.73SQ M
GLUCOSE SERPL-MCNC: 168 MG/DL (ref 65–140)
GLUCOSE SERPL-MCNC: 192 MG/DL (ref 65–140)
HCT VFR BLD AUTO: 35.5 % (ref 34.8–46.1)
HGB BLD-MCNC: 11.1 G/DL (ref 11.5–15.4)
INR PPP: 0.92 (ref 0.84–1.19)
MCH RBC QN AUTO: 27.3 PG (ref 26.8–34.3)
MCHC RBC AUTO-ENTMCNC: 31.3 G/DL (ref 31.4–37.4)
MCV RBC AUTO: 87 FL (ref 82–98)
PLATELET # BLD AUTO: 232 THOUSANDS/UL (ref 149–390)
PMV BLD AUTO: 11 FL (ref 8.9–12.7)
POTASSIUM SERPL-SCNC: 4.1 MMOL/L (ref 3.5–5.3)
PROTHROMBIN TIME: 12.4 SECONDS (ref 11.6–14.5)
RBC # BLD AUTO: 4.07 MILLION/UL (ref 3.81–5.12)
RSV RNA RESP QL NAA+PROBE: NEGATIVE
SARS-COV-2 RNA RESP QL NAA+PROBE: NEGATIVE
SODIUM SERPL-SCNC: 139 MMOL/L (ref 136–145)
WBC # BLD AUTO: 5.79 THOUSAND/UL (ref 4.31–10.16)

## 2022-04-22 PROCEDURE — 84484 ASSAY OF TROPONIN QUANT: CPT | Performed by: EMERGENCY MEDICINE

## 2022-04-22 PROCEDURE — 36415 COLL VENOUS BLD VENIPUNCTURE: CPT | Performed by: EMERGENCY MEDICINE

## 2022-04-22 PROCEDURE — 83036 HEMOGLOBIN GLYCOSYLATED A1C: CPT

## 2022-04-22 PROCEDURE — 85027 COMPLETE CBC AUTOMATED: CPT | Performed by: EMERGENCY MEDICINE

## 2022-04-22 PROCEDURE — 0241U HB NFCT DS VIR RESP RNA 4 TRGT: CPT | Performed by: EMERGENCY MEDICINE

## 2022-04-22 PROCEDURE — 80048 BASIC METABOLIC PNL TOTAL CA: CPT | Performed by: EMERGENCY MEDICINE

## 2022-04-22 PROCEDURE — 82948 REAGENT STRIP/BLOOD GLUCOSE: CPT

## 2022-04-22 PROCEDURE — 96374 THER/PROPH/DIAG INJ IV PUSH: CPT

## 2022-04-22 PROCEDURE — 70498 CT ANGIOGRAPHY NECK: CPT

## 2022-04-22 PROCEDURE — 99285 EMERGENCY DEPT VISIT HI MDM: CPT | Performed by: EMERGENCY MEDICINE

## 2022-04-22 PROCEDURE — 93005 ELECTROCARDIOGRAM TRACING: CPT

## 2022-04-22 PROCEDURE — 70496 CT ANGIOGRAPHY HEAD: CPT

## 2022-04-22 PROCEDURE — 85610 PROTHROMBIN TIME: CPT | Performed by: EMERGENCY MEDICINE

## 2022-04-22 PROCEDURE — 99285 EMERGENCY DEPT VISIT HI MDM: CPT

## 2022-04-22 PROCEDURE — 85730 THROMBOPLASTIN TIME PARTIAL: CPT | Performed by: EMERGENCY MEDICINE

## 2022-04-22 RX ORDER — LATANOPROST 50 UG/ML
1 SOLUTION/ DROPS OPHTHALMIC
Status: DISCONTINUED | OUTPATIENT
Start: 2022-04-22 | End: 2022-04-25 | Stop reason: HOSPADM

## 2022-04-22 RX ORDER — PRAVASTATIN SODIUM 10 MG
10 TABLET ORAL
Status: DISCONTINUED | OUTPATIENT
Start: 2022-04-22 | End: 2022-04-25 | Stop reason: HOSPADM

## 2022-04-22 RX ORDER — ASPIRIN 81 MG/1
324 TABLET ORAL ONCE
Status: COMPLETED | OUTPATIENT
Start: 2022-04-22 | End: 2022-04-22

## 2022-04-22 RX ORDER — LEVOTHYROXINE SODIUM 0.07 MG/1
37.5 TABLET ORAL
Status: DISCONTINUED | OUTPATIENT
Start: 2022-04-23 | End: 2022-04-25 | Stop reason: HOSPADM

## 2022-04-22 RX ORDER — PANTOPRAZOLE SODIUM 40 MG/1
40 TABLET, DELAYED RELEASE ORAL
Status: DISCONTINUED | OUTPATIENT
Start: 2022-04-23 | End: 2022-04-25 | Stop reason: HOSPADM

## 2022-04-22 RX ORDER — NITROFURANTOIN MACROCRYSTALS 50 MG/1
50 CAPSULE ORAL
Status: DISCONTINUED | OUTPATIENT
Start: 2022-04-22 | End: 2022-04-25 | Stop reason: HOSPADM

## 2022-04-22 RX ORDER — ALBUTEROL SULFATE 90 UG/1
2 AEROSOL, METERED RESPIRATORY (INHALATION) EVERY 6 HOURS PRN
Status: DISCONTINUED | OUTPATIENT
Start: 2022-04-22 | End: 2022-04-25 | Stop reason: HOSPADM

## 2022-04-22 RX ORDER — DIPHENHYDRAMINE HYDROCHLORIDE 50 MG/ML
25 INJECTION INTRAMUSCULAR; INTRAVENOUS ONCE
Status: COMPLETED | OUTPATIENT
Start: 2022-04-22 | End: 2022-04-22

## 2022-04-22 RX ORDER — CLOPIDOGREL BISULFATE 75 MG/1
300 TABLET ORAL ONCE
Status: COMPLETED | OUTPATIENT
Start: 2022-04-22 | End: 2022-04-22

## 2022-04-22 RX ORDER — ASPIRIN 325 MG
325 TABLET ORAL ONCE
Status: DISCONTINUED | OUTPATIENT
Start: 2022-04-22 | End: 2022-04-22

## 2022-04-22 RX ORDER — DIPHENHYDRAMINE HYDROCHLORIDE 50 MG/ML
INJECTION INTRAMUSCULAR; INTRAVENOUS
Status: COMPLETED
Start: 2022-04-22 | End: 2022-04-22

## 2022-04-22 RX ADMIN — ASPIRIN 324 MG: 81 TABLET, COATED ORAL at 21:49

## 2022-04-22 RX ADMIN — DIPHENHYDRAMINE HYDROCHLORIDE 25 MG: 50 INJECTION, SOLUTION INTRAMUSCULAR; INTRAVENOUS at 20:25

## 2022-04-22 RX ADMIN — IOHEXOL 85 ML: 350 INJECTION, SOLUTION INTRAVENOUS at 20:19

## 2022-04-22 RX ADMIN — DIPHENHYDRAMINE HYDROCHLORIDE 25 MG: 50 INJECTION INTRAMUSCULAR; INTRAVENOUS at 20:25

## 2022-04-22 RX ADMIN — CLOPIDOGREL BISULFATE 300 MG: 75 TABLET ORAL at 21:42

## 2022-04-23 LAB
4HR DELTA HS TROPONIN: 5 NG/L
ALBUMIN SERPL BCP-MCNC: 3 G/DL (ref 3.5–5)
ALP SERPL-CCNC: 142 U/L (ref 46–116)
ALT SERPL W P-5'-P-CCNC: 12 U/L (ref 12–78)
ANION GAP SERPL CALCULATED.3IONS-SCNC: 7 MMOL/L (ref 4–13)
AST SERPL W P-5'-P-CCNC: 17 U/L (ref 5–45)
ATRIAL RATE: 104 BPM
BILIRUB SERPL-MCNC: 0.29 MG/DL (ref 0.2–1)
BUN SERPL-MCNC: 19 MG/DL (ref 5–25)
CALCIUM ALBUM COR SERPL-MCNC: 9.5 MG/DL (ref 8.3–10.1)
CALCIUM SERPL-MCNC: 8.7 MG/DL (ref 8.3–10.1)
CARDIAC TROPONIN I PNL SERPL HS: 18 NG/L
CHLORIDE SERPL-SCNC: 104 MMOL/L (ref 100–108)
CHOLEST SERPL-MCNC: 159 MG/DL
CO2 SERPL-SCNC: 29 MMOL/L (ref 21–32)
CREAT SERPL-MCNC: 1.16 MG/DL (ref 0.6–1.3)
EST. AVERAGE GLUCOSE BLD GHB EST-MCNC: 200 MG/DL
GFR SERPL CREATININE-BSD FRML MDRD: 44 ML/MIN/1.73SQ M
GLUCOSE P FAST SERPL-MCNC: 150 MG/DL (ref 65–99)
GLUCOSE SERPL-MCNC: 141 MG/DL (ref 65–140)
GLUCOSE SERPL-MCNC: 150 MG/DL (ref 65–140)
GLUCOSE SERPL-MCNC: 157 MG/DL (ref 65–140)
GLUCOSE SERPL-MCNC: 170 MG/DL (ref 65–140)
GLUCOSE SERPL-MCNC: 180 MG/DL (ref 65–140)
GLUCOSE SERPL-MCNC: 192 MG/DL (ref 65–140)
HBA1C MFR BLD: 8.6 %
HDLC SERPL-MCNC: 48 MG/DL
LDLC SERPL CALC-MCNC: 92 MG/DL (ref 0–100)
MAGNESIUM SERPL-MCNC: 1.9 MG/DL (ref 1.6–2.6)
P AXIS: 65 DEGREES
PHOSPHATE SERPL-MCNC: 3.9 MG/DL (ref 2.3–4.1)
POTASSIUM SERPL-SCNC: 4.2 MMOL/L (ref 3.5–5.3)
PR INTERVAL: 166 MS
PROT SERPL-MCNC: 6.3 G/DL (ref 6.4–8.2)
QRS AXIS: -40 DEGREES
QRSD INTERVAL: 140 MS
QT INTERVAL: 408 MS
QTC INTERVAL: 527 MS
SODIUM SERPL-SCNC: 140 MMOL/L (ref 136–145)
T WAVE AXIS: 122 DEGREES
TRIGL SERPL-MCNC: 97 MG/DL
VENTRICULAR RATE: 100 BPM

## 2022-04-23 PROCEDURE — 93010 ELECTROCARDIOGRAM REPORT: CPT | Performed by: INTERNAL MEDICINE

## 2022-04-23 PROCEDURE — 97166 OT EVAL MOD COMPLEX 45 MIN: CPT

## 2022-04-23 PROCEDURE — 97163 PT EVAL HIGH COMPLEX 45 MIN: CPT

## 2022-04-23 PROCEDURE — 84100 ASSAY OF PHOSPHORUS: CPT | Performed by: PHYSICIAN ASSISTANT

## 2022-04-23 PROCEDURE — 99223 1ST HOSP IP/OBS HIGH 75: CPT | Performed by: PSYCHIATRY & NEUROLOGY

## 2022-04-23 PROCEDURE — 99220 PR INITIAL OBSERVATION CARE/DAY 70 MINUTES: CPT | Performed by: INTERNAL MEDICINE

## 2022-04-23 PROCEDURE — 84484 ASSAY OF TROPONIN QUANT: CPT | Performed by: EMERGENCY MEDICINE

## 2022-04-23 PROCEDURE — 80053 COMPREHEN METABOLIC PANEL: CPT | Performed by: PHYSICIAN ASSISTANT

## 2022-04-23 PROCEDURE — 82948 REAGENT STRIP/BLOOD GLUCOSE: CPT

## 2022-04-23 PROCEDURE — 83735 ASSAY OF MAGNESIUM: CPT | Performed by: PHYSICIAN ASSISTANT

## 2022-04-23 PROCEDURE — 80061 LIPID PANEL: CPT

## 2022-04-23 PROCEDURE — 97116 GAIT TRAINING THERAPY: CPT

## 2022-04-23 RX ORDER — OXYCODONE HYDROCHLORIDE AND ACETAMINOPHEN 5; 325 MG/1; MG/1
1 TABLET ORAL EVERY 6 HOURS PRN
Status: DISCONTINUED | OUTPATIENT
Start: 2022-04-23 | End: 2022-04-25 | Stop reason: HOSPADM

## 2022-04-23 RX ORDER — ASPIRIN 81 MG/1
81 TABLET, CHEWABLE ORAL DAILY
Status: DISCONTINUED | OUTPATIENT
Start: 2022-04-23 | End: 2022-04-25 | Stop reason: HOSPADM

## 2022-04-23 RX ORDER — LABETALOL HYDROCHLORIDE 5 MG/ML
10 INJECTION, SOLUTION INTRAVENOUS EVERY 6 HOURS PRN
Status: DISCONTINUED | OUTPATIENT
Start: 2022-04-23 | End: 2022-04-24

## 2022-04-23 RX ORDER — OXYBUTYNIN CHLORIDE 5 MG/1
5 TABLET, EXTENDED RELEASE ORAL DAILY
Status: DISCONTINUED | OUTPATIENT
Start: 2022-04-23 | End: 2022-04-25 | Stop reason: HOSPADM

## 2022-04-23 RX ORDER — GUAIFENESIN 600 MG
1200 TABLET, EXTENDED RELEASE 12 HR ORAL 2 TIMES DAILY PRN
Status: DISCONTINUED | OUTPATIENT
Start: 2022-04-23 | End: 2022-04-25 | Stop reason: HOSPADM

## 2022-04-23 RX ORDER — CLOPIDOGREL BISULFATE 75 MG/1
75 TABLET ORAL DAILY
Status: DISCONTINUED | OUTPATIENT
Start: 2022-04-23 | End: 2022-04-25 | Stop reason: HOSPADM

## 2022-04-23 RX ADMIN — INSULIN LISPRO 1 UNITS: 100 INJECTION, SOLUTION INTRAVENOUS; SUBCUTANEOUS at 18:09

## 2022-04-23 RX ADMIN — NITROFURANTOIN MACROCRYSTALS 50 MG: 50 CAPSULE ORAL at 00:16

## 2022-04-23 RX ADMIN — INSULIN LISPRO 2 UNITS: 100 INJECTION, SOLUTION INTRAVENOUS; SUBCUTANEOUS at 09:20

## 2022-04-23 RX ADMIN — OXYBUTYNIN CHLORIDE 5 MG: 5 TABLET, EXTENDED RELEASE ORAL at 11:59

## 2022-04-23 RX ADMIN — ALBUTEROL SULFATE 2 PUFF: 90 AEROSOL, METERED RESPIRATORY (INHALATION) at 21:01

## 2022-04-23 RX ADMIN — LATANOPROST 1 DROP: 50 SOLUTION OPHTHALMIC at 00:16

## 2022-04-23 RX ADMIN — ENOXAPARIN SODIUM 30 MG: 30 INJECTION SUBCUTANEOUS at 09:20

## 2022-04-23 RX ADMIN — CLOPIDOGREL BISULFATE 75 MG: 75 TABLET ORAL at 09:20

## 2022-04-23 RX ADMIN — PRAVASTATIN SODIUM 10 MG: 10 TABLET ORAL at 00:16

## 2022-04-23 RX ADMIN — LABETALOL HYDROCHLORIDE 10 MG: 5 INJECTION, SOLUTION INTRAVENOUS at 21:54

## 2022-04-23 RX ADMIN — LEVOTHYROXINE SODIUM 37.5 MCG: 75 TABLET ORAL at 05:19

## 2022-04-23 RX ADMIN — PANTOPRAZOLE SODIUM 40 MG: 40 TABLET, DELAYED RELEASE ORAL at 05:19

## 2022-04-23 RX ADMIN — ALBUTEROL SULFATE 2 PUFF: 90 AEROSOL, METERED RESPIRATORY (INHALATION) at 09:21

## 2022-04-23 RX ADMIN — OXYCODONE HYDROCHLORIDE AND ACETAMINOPHEN 1 TABLET: 5; 325 TABLET ORAL at 21:58

## 2022-04-23 RX ADMIN — GUAIFENESIN 1200 MG: 600 TABLET ORAL at 21:51

## 2022-04-23 RX ADMIN — PRAVASTATIN SODIUM 10 MG: 10 TABLET ORAL at 21:01

## 2022-04-23 RX ADMIN — NITROFURANTOIN MACROCRYSTALS 50 MG: 50 CAPSULE ORAL at 21:01

## 2022-04-23 RX ADMIN — INSULIN LISPRO 1 UNITS: 100 INJECTION, SOLUTION INTRAVENOUS; SUBCUTANEOUS at 12:00

## 2022-04-23 RX ADMIN — LATANOPROST 1 DROP: 50 SOLUTION OPHTHALMIC at 21:01

## 2022-04-23 RX ADMIN — ASPIRIN 81 MG CHEWABLE TABLET 81 MG: 81 TABLET CHEWABLE at 09:20

## 2022-04-23 NOTE — ASSESSMENT & PLAN NOTE
Lab Results   Component Value Date    HGBA1C 7 2 (A) 05/17/2021       Recent Labs     04/22/22 2001   POCGLU 168*       Blood Sugar Average: Last 72 hrs:  (P) 168     Plan  · Hold metformin  · Insulin SS with meals  · POCT glucose checks before meals and at HS  · Follow up Hgb A1C  · Consistent carbohydrate diet

## 2022-04-23 NOTE — ASSESSMENT & PLAN NOTE
· Patient presented with expressive aphasia, generalized fatigue No facial droop,arm weakness or lateralization neurologically  Patient was reported to have s/s in the morning presented to ED in the evening  Unsure of last known well time  · PMH: HTN compliant with medication    Workup:   In the ED, /77  · EKG - NSR Rate 100  · CT of the head - No evidence of acute intracranial hemorrhage  Right basal ganglia low-attenuation age-indeterminate ischemia, correlate with noncontrast brain MRI  · CT of the Neck with contrast - Moderate to severe nonocclusive right M1 segment stenosis  Plan - Stroke pathway:  · ASA 81 mg q d  · Continue home statin therapy  · Echo pending  · Patient cannot have MRI due to MRI incompatible back stimulator device  · Consider repeat CT in 48 hours, follow up neurology recommendations   · Monitor on telemetry  · Lipid Panel, HbA1c pending  · PT/OT/Speech eval  · Allow for permissive hypertension with SBP upto 220 in the first 24 hrs then  140-170 for the next 48 hours  · Neuro checks q 4h times 24 hours  · Neurology consult

## 2022-04-23 NOTE — UTILIZATION REVIEW
Initial Clinical Review  Observation on 04/22 @ 2204 upgraded to Inpatient on 04/23 @ 1735  Pt requiring continued stay for stroke workup    Admission: Date/Time/Statement:   Admission Orders (From admission, onward)     Ordered        04/23/22 1735  Inpatient Admission  Once            04/22/22 2204  Place in Observation  Once                      Orders Placed This Encounter   Procedures    Inpatient Admission     Standing Status:   Standing     Number of Occurrences:   1     Order Specific Question:   Level of Care     Answer:   Med Surg [16]     Order Specific Question:   Estimated length of stay     Answer:   More than 2 Midnights     Order Specific Question:   Certification     Answer:   I certify that inpatient services are medically necessary for this patient for a duration of greater than two midnights  See H&P and MD Progress Notes for additional information about the patient's course of treatment  ED Arrival Information     Expected Arrival Acuity    - 4/22/2022 19:55 Emergent         Means of arrival Escorted by Service Admission type    [facility] Self Hospitalist Emergency         Arrival complaint    RIGHT SIDED NUMBNESS/SLURRED SPEECH/CONFUSION        Chief Complaint   Patient presents with    CVA/TIA-like Symptoms     patient presents from home with slurred speech, slight global aphasia and right sided weakness  Last known well last night       Initial Presentation: 76 y o  female from home presented to the ED as a walk in with word finding difficulties, right facial numbness and b/l LE weakness that started this am  PMH of htn, PUD, T2DM, hypothyroidism, hyperlipidemia, overactive bladder  Pt's speech difficulty and facial numbness were waxing and waning but LE weakness remained  In the ED, she was noted to be hypertensive, BP- 178/77  EKG- NSR  Labs unremarkable  CT head- No evidence of acute intracranial hemorrhage   CT of the Neck with contrast - Moderate to severe nonocclusive right M1 segment stenosis  On exam, pt's speech difficulty and facial numbness resolved  Bilateral LE weakness R>L present  Admit as observation level of care for stroke like symptoms: Stroke pathway:ASA 81 mg q d  Darletta Pac Continue home statin therapy  Echo pending  Unable to do MRI due to MRI incompatible back stimulator device  Monitor on telemetry  Lipid Panel, HbA1c pending  PT/OT/Speech eval  Allow for permissive hypertension with SBP upto 220 in the first 24 hrs then  140-170 for the next 48 hours  Neuro checks q 4h times 24 hours  Neurology consult  04/23 Neurology Consult: As a result of unclear onset of sx patient was determined to not be a candidate for tPA   stroke like symptoms: place on troke pathway: Cont on telemetry  Echo pending  Repeat CT tomorrow  Cont ASA, plavix  Neuro checks  PT/OT/ST  Date 04/24 Day 2:  IM Notes: No acute overnight events  Pt is AAO x 3,appropriately responsive and following commands, vitals stable  Speech is clear, no facial droop or asymmetry  Tolerating p o  Without dysphagia  S/p  repeat CT of the head today which showed no acute infarct  continue to monitor on stroke pathway, continue aspirin, statin, blood pressure control  Echocardiogram is pending       ED Triage Vitals   Temperature Pulse Respirations Blood Pressure SpO2   04/22/22 2005 04/22/22 2005 04/22/22 2005 04/22/22 2005 04/22/22 2005   98 °F (36 7 °C) 87 18 (!) 178/77 98 %      Temp Source Heart Rate Source Patient Position - Orthostatic VS BP Location FiO2 (%)   04/22/22 2005 04/22/22 2005 04/22/22 2005 04/22/22 2005 --   Oral Monitor Lying Right arm       Pain Score       04/23/22 0100       No Pain          Wt Readings from Last 1 Encounters:   04/24/22 90 3 kg (199 lb)     Additional Vital Signs:   Date/Time Temp Pulse Resp BP MAP (mmHg) SpO2 O2 Device Patient Position - Orthostatic VS   04/24/22 1534 98 5 °F (36 9 °C) 87 18 139/74 86 96 % None (Room air) Lying   04/24/22 0809 98 6 °F (37 °C) 86 18 153/73 105 97 % None (Room air) Sitting   04/24/22 0500 -- -- -- 185/83 Abnormal  -- -- -- --   04/23/22 2300 -- -- -- 180/84 Abnormal  -- -- -- Lying   04/23/22 2129 98 3 °F (36 8 °C) 96 18 224/101 Abnormal  145 95 % None (Room air) Lying   04/23/22 1701 98 2 °F (36 8 °C) 93 18 172/80 Abnormal  114 99 % None (Room air) Sitting   04/23/22 1503 98 2 °F (36 8 °C) 92 18 153/72 103 97 % None (Room air) Sitting     Date/Time Temp Pulse Resp BP MAP (mmHg) SpO2 O2 Device Patient Position - Orthostatic VS   04/23/22 0726 97 8 °F (36 6 °C) 89 18 167/74 124 97 % None (Room air) Lying   04/23/22 0400 -- 80 18 148/68 -- 97 % None (Room air) Lying   04/23/22 0300 -- 78 16 157/72 110 99 % None (Room air) --   04/23/22 0200 -- 83 18 144/72 102 98 % -- --   04/23/22 0100 97 6 °F (36 4 °C) 90 16 150/80 -- 96 % None (Room air) Lying   04/22/22 2330 -- 92 16 192/76 Abnormal  121 96 % None (Room air) Lying   04/22/22 22:00:19 -- -- -- 191/91 Abnormal  -- -- -- --   04/22/22 2155 -- 94 -- -- -- -- -- --   04/22/22 2150 -- 96 -- -- -- -- -- --   04/22/22 2145 -- 98 16 177/95 Abnormal  -- 97 % None (Room air) --   04/22/22 2140 -- 102 -- -- -- -- -- --   04/22/22 2135 -- 102 -- -- -- -- -- --   04/22/22 21:30:19 -- -- -- 177/95 Abnormal  -- -- -- --   04/22/22 2130 -- 104 -- -- -- -- -- --   04/22/22 2125 -- 94 -- -- -- 97 % -- --   04/22/22 2120 -- 96 -- -- -- 98 % -- --   04/22/22 21:15:19 -- 96 16 155/75 -- 97 % None (Room air) --   04/22/22 2115 -- 96 -- -- -- 97 % -- --   04/22/22 2110 -- 98 -- -- -- 97 % -- --   04/22/22 2105 -- 98 -- -- -- 97 % -- --   04/22/22 21:00:19 -- 98 16 164/73 -- 97 % None (Room air) --   04/22/22 2100 -- 98 -- -- -- 97 % -- --   04/22/22 2055 -- 100 -- -- -- 96 % -- --   04/22/22 2050 -- 98 -- -- -- 99 % -- --   04/22/22 20:45:19 -- 98 16 138/69 -- 98 % None (Room air) --   04/22/22 2045 -- 98 -- -- -- 98 % -- --   04/22/22 2040 -- 98 -- -- -- 99 % -- --   04/22/22 2035 -- 100 -- -- -- 93 % -- --   04/22/22 20:30:19 -- -- -- 154/69 -- -- -- --   04/22/22 2030 -- 99 16 154/69 -- 93 % None (Room air) Lying   04/22/22 2020 -- 100 16 172/72 Abnormal  -- 95 % None (Room air) Lying   04/22/22 2010 -- 100 -- -- -- 92 % -- --       Pertinent Labs/Diagnostic Test Results:   CT head wo contrast   Final Result by Maria Sim MD (04/24 1152)         1  Mild, chronic microangiopathy and small bilateral hypodensities in the right basal ganglia and left subinsular cortex are stable  No new or large evolving infarction  2   No acute intracranial hemorrhage  3   Mild sphenoid sinusitis  Workstation performed: [id number]         CTA stroke alert (head/neck)   Final Result by Jeevan Chin MD (04/22 2040)      Moderate to severe nonocclusive right M1 segment stenosis  I personally discussed this study with neurologist on 4/22/2022 at 8:24 PM                         Workstation performed: [id number]         CT stroke alert brain   Final Result by Jeevan Chin MD (04/22 2030)      No evidence of acute intracranial hemorrhage  Right basal ganglia low-attenuation age-indeterminate ischemia, correlate with noncontrast brain MRI                  I personally discussed this study with neurologist on 4/22/2022 at 8:24 PM                 Workstation performed: [id number]           04/22 EKG result: Normal sinus rhythm  Left axis deviation  Left bundle branch block    Results from last 7 days   Lab Units 04/22/22 2150   SARS-COV-2  Negative     Results from last 7 days   Lab Units 04/24/22  0510 04/22/22 2010   WBC Thousand/uL 6 38 5 79   HEMOGLOBIN g/dL 11 0* 11 1*   HEMATOCRIT % 34 6* 35 5   PLATELETS Thousands/uL 226 232         Results from last 7 days   Lab Units 04/24/22  0510 04/23/22  0559 04/22/22 2010   SODIUM mmol/L 140 140 139   POTASSIUM mmol/L 4 3 4 2 4 1   CHLORIDE mmol/L 104 104 102   CO2 mmol/L 28 29 28   ANION GAP mmol/L 8 7 9   BUN mg/dL 20 19 17   CREATININE mg/dL 1 12 1 16 1 37*   EGFR ml/min/1 73sq m 46 44 36   CALCIUM mg/dL 8 9 8 7 8 9   MAGNESIUM mg/dL  --  1 9  --    PHOSPHORUS mg/dL  --  3 9  --      Results from last 7 days   Lab Units 04/23/22  0559   AST U/L 17   ALT U/L 12   ALK PHOS U/L 142*   TOTAL PROTEIN g/dL 6 3*   ALBUMIN g/dL 3 0*   TOTAL BILIRUBIN mg/dL 0 29     Results from last 7 days   Lab Units 04/24/22  1536 04/24/22  1201 04/24/22  0810 04/23/22  2142 04/23/22  1715 04/23/22  1051 04/23/22  0730 04/23/22  0204 04/22/22 2001   POC GLUCOSE mg/dl 208* 242* 235* 157* 170* 180* 192* 141* 168*     Results from last 7 days   Lab Units 04/24/22  0510 04/23/22  0559 04/22/22 2010   GLUCOSE RANDOM mg/dL 183* 150* 192*       Results from last 7 days   Lab Units 04/23/22  0133 04/22/22  2238 04/22/22 2010   HS TNI 0HR ng/L  --   --  13   HS TNI 2HR ng/L  --  15  --    HSTNI D2 ng/L  --  2  --    HS TNI 4HR ng/L 18  --   --    HSTNI D4 ng/L 5  --   --          Results from last 7 days   Lab Units 04/22/22 2010   PROTIME seconds 12 4   INR  0 92   PTT seconds 30           Results from last 7 days   Lab Units 04/22/22  2150   INFLUENZA A PCR  Negative   INFLUENZA B PCR  Negative   RSV PCR  Negative       ED Treatment:   Medication Administration from 04/22/2022 1955 to 04/23/2022 0045       Date/Time Order Dose Route Action     04/22/2022 2025 diphenhydrAMINE (BENADRYL) injection 25 mg 25 mg Intravenous Given     04/22/2022 2019 iohexol (OMNIPAQUE) 350 MG/ML injection (SINGLE-DOSE) 85 mL 85 mL Intravenous Given     04/22/2022 2133 aspirin tablet 325 mg   Oral Canceled Entry     04/22/2022 2142 clopidogrel (PLAVIX) tablet 300 mg 300 mg Oral Given     04/22/2022 2149 aspirin (ECOTRIN LOW STRENGTH) EC tablet 324 mg 324 mg Oral Given     04/23/2022 0016 latanoprost (XALATAN) 0 005 % ophthalmic solution 1 drop 1 drop Both Eyes Given     04/23/2022 0016 pravastatin (PRAVACHOL) tablet 10 mg 10 mg Oral Given     04/23/2022 0016 nitrofurantoin (MACRODANTIN) capsule 50 mg 50 mg Oral Given Past Medical History:   Diagnosis Date    Acid reflux     Anemia     hx of iron-deficient    Anxiety     Arthritis     Asthma     last needed inhaler last year 2020    Basal cell carcinoma     upper lip    Chronic narcotic dependence (HCC)     Chronic pain     Colon polyp     Cystocele     Diabetes mellitus (HCC)     stable    Disease of thyroid gland     hypothyroidism    Diverticulosis     Dysfunctional uterine bleeding     last assessed - [id number]    Fibromyalgia     Gastric ulcer     Gastroparesis     History of colonic polyps     last assessed - [id number]    History of gastroesophageal reflux (GERD)     Hypercholesterolemia     Hyperlipidemia     Hypertension     IBS (irritable bowel syndrome)     Post laminectomy syndrome     Seasonal allergies     Spinal stenosis      Present on Admission:   Hypothyroidism   Type 2 diabetes mellitus with hyperglycemia, without long-term current use of insulin (HCC)   Hypertension   High cholesterol   Overactive bladder      Admitting Diagnosis: Stroke ([facility] [address] ) [I63 9]  Stroke-like symptoms [R29 90]  Symptoms of cerebrovascular accident (CVA) [R09 89]  Age/Sex: 76 y o  female  Admission Orders:  50 Hr telemetry monitoring  SCD  PT/OT  ST  Baseline NIH stroke scale on admission, reassess Q24H x 2 D, Nursing dysphagia assessment prior to staring diet, neuro checks       Scheduled Medications:  aspirin, 81 mg, Oral, Daily  clopidogrel, 75 mg, Oral, Daily  enoxaparin, 30 mg, Subcutaneous, Daily  insulin lispro, 1-6 Units, Subcutaneous, TID AC  latanoprost, 1 drop, Both Eyes, HS  levothyroxine, 37 5 mcg, Oral, Early Morning  lisinopril, 20 mg, Oral, Daily  metoprolol tartrate, 12 5 mg, Oral, Q12H ARY  nitrofurantoin, 50 mg, Oral, HS  oxybutynin, 5 mg, Oral, Daily  pantoprazole, 40 mg, Oral, Early Morning  pravastatin, 10 mg, Oral, HS      Continuous IV Infusions:     PRN Meds:  albuterol, 2 puff, Inhalation, Q6H PRN 04/23 x 1  guaiFENesin, 1,200 mg, Oral, BID PRN 04/23 x 1  labetalol, 10 mg, Intravenous, Q6H PRN  oxyCODONE-acetaminophen, 1 tablet, Oral, Q6H PRN 04/23 x 1, 04/24 x 1  labetalol (NORMODYNE) injection 10 mg q6h IV prn 04/23 x 1, 04/24 x 1      IP CONSULT TO NEUROLOGY  IP CONSULT TO CASE MANAGEMENT  IP CONSULT TO NUTRITION SERVICES    Network Utilization Review Department  ATTENTION: Please call with any questions or concerns to [phone] and carefully listen to the prompts so that you are directed to the right person  All voicemails are confidential   Clyde Rodriguez all requests for admission clinical reviews, approved or denied determinations and any other requests to dedicated fax number below belonging to the campus where the patient is receiving treatment   List of dedicated fax numbers for the Facilities:  [address] DENIALS (Administrative/Medical Necessity) [phone]   [address] (Maternity/NICU/Pediatrics) [phone]   [address] [place] [address]  [address] [facility] [place] [address] [address] [place] [address] [address] [facility] [place] [address] [phone]

## 2022-04-23 NOTE — ASSESSMENT & PLAN NOTE
· Hold lisinopril  · Allow permissive hypertension up to  in the 1st 24 hours then  - 170 for the next 48 hours  · Labetalol 10 mg p r n  For SBP > 220

## 2022-04-23 NOTE — PLAN OF CARE
Problem: PHYSICAL THERAPY ADULT  Goal: Performs mobility at highest level of function for planned discharge setting  See evaluation for individualized goals  Description: Treatment/Interventions: LE strengthening/ROM,Elevations,Therapeutic exercise,Endurance training,Cognitive reorientation,Patient/family training,Equipment eval/education,Bed mobility,Gait training,Spoke to nursing          See flowsheet documentation for full assessment, interventions and recommendations  Note: Prognosis: Fair  Problem List: Decreased strength,Decreased endurance,Impaired balance,Decreased mobility,Pain,Obesity  Assessment: Amy Blue is a 76 y o  Female who presents to [address] [place] [address] on 4/23/2022 from home w/ c/o stroke-like s/s:  slurred speech, slight global aphasia, R sided weakness, R facial numbness, and bilateral LE weakness  Orders for PT eval and treat received, w/ activity orders of up and out of bed as tolerated  Pt presents w/ comorbidities of T2DM, HTN, overactive bladder, hypothyroid, high cholesterol, basal cell carcinoma, anemia, diverticulosis, GERD, and lumbar fusion of T-12-L5    At baseline, pt mobilizes independently w/ intermittent use of SPC and walker, and reports 2 falls in the last 6 months  Upon eval, pt family reports 6 falls in the last 6 months  Upon evaluation, pt presents w/ the following deficits: decreased insight to fall hx, weakness, decreased ROM, impaired balance, decreased endurance and pain limiting functional mobility  Upon eval,, supervision for transfers, and min A x 1 w/ SPC for gait  Unable to assess bed mobility tasks due to pt positioned OOB at beginning of eval  Pt's clinical presentation is unstable/unpredictable due to need for assist w/ all phases of mobility when usually mobilizing independently, pain impacting overall mobility status, need for input for mobility technique and recent history of falls  Patient is at an increased risk of falls due to balance and strength deficits  Given the above findings, discharge recommendation is for Home w/ HHPT  During this admission, pt would benefit from continued skilled inpatient PT in the acute care setting in order to address the abovementioned deficits to maximize function and mobility before DC from acute care  PT Discharge Recommendation: Home with home health rehabilitation          See flowsheet documentation for full assessment

## 2022-04-23 NOTE — ASSESSMENT & PLAN NOTE
Beronica Tran is a 76 y o  female  hypertension, diabetes, hypothyroidism, hyperlipidemia and overactive bladder who presents with right facial numbness and dysarthria  Woke up with sx  Stroke alert activated on 4/22/2022  7:58 PM with initial NIHSS of 1  Initial BP on arrival was Blood Pressure: (!) 178/77  As a result of unclear onset of sx patient was determined to not be a candidate for tPA   BP over last 24 hours: Blood Pressure: 153/73  current BP: Blood Pressure: 153/73    A1C 8 6, LDL 92    Imaging:   CTH:  hypodensity along the right basal ganglia  New since 2019 but appears chronic   Repeat CTH: Mild, chronic microangiopathy and small bilateral hypodensities in the right basal ganglia and left subinsular cortex are stable  No new or large evolving infarction   CTA: moderate to severe stenosis of the right M1 segment     MRI:Unable to do because of spinal cord stimulator   Echocardiogram: pending    Telemetry: monitor     Plan:   Stroke pathway    Goal BP: 140-170   Repeat head CT no new findings continue DAPT for 3 week then aspirin monotherapy    Statin   A1c/lipid panel as above  o needs better glycemic control   o LDL 92, Goal LDL < 70, recommend switching to atorvastatin 40 mg as outpatient    Maintain glucose below 200   Unable to do MRI given spinal cord stimulator    Echo pending   Neuro checks   Monitor on telemetry   Medical management as per primary team appreciated   PT/OT/Speech consults appreciated when able   Follow up with neurovascular

## 2022-04-23 NOTE — CONSULTS
NEUROLOGY RESIDENCY CONSULT NOTE     Name: Leopold Kirsten   Age & Sex: 76 y o  female   MRN: [id number]  Unit/Bed#: S -01   Encounter: [id number]  Length of Stay: 0    ASSESSMENT & PLAN     * Stroke-like symptoms  Assessment & Plan  Leopold Kirsten is a 76 y o  female  hypertension, diabetes, hypothyroidism, hyperlipidemia and overactive bladder who presents with right facial numbness and dysarthria  Woke up with sx  Stroke alert activated on 4/22/2022  7:58 PM with initial NIHSS of 1  Initial BP on arrival was Blood Pressure: (!) 178/77  As a result of unclear onset of sx patient was determined to not be a candidate for tPA  BP over last 24 hours: Blood Pressure: 167/74  current BP: Blood Pressure: 167/74   A1C 6 8, LDL 92    Imaging:  CTH:  hypodensity along the right basal ganglia  CTA showing moderate to severe stenosis of the right M1 segment  Patients aphasia and dysarthria lasted into the night  This morning reports resolution of her symptoms with residual fogginess  MRI:Unable to do because of spinal cord stimulator  Echocardiogram: pending   Telemetry: monitor     Plan:  Stroke pathway   Continue aspirin and plavix  Repeat head CT tomorrow  If lacunar stroke or no new findings continue DAPT for 3 weeks  If cortical continue aspirin monotherapy with outpatient loop recorder   Statin  A1c/lipid panel as above  Maintain glucose below 200  Unable to do MRI given spinal cord stimulator   Echo pending  Neuro checks  Monitor on telemetry  Medical management as per primary team appreciated  PT/OT/Speech consults appreciated when able      Leopold Kirsten will need follow up in in 2 weeks with neurovascular AP or attending  She will not require outpatient neurological testing  Schedule follow up:  This should be completed prior to removing patient from list or discharge     Pending for discharge: Stroke workup     SUBJECTIVE     Reason for Consult / Principal Problem: Stroke like sx   Hx and PE limited by: none     HPI: Beronica Tran is a 76 y o  right handed  female  with medical history of hypertension, diabetes, hypothyroidism, hyperlipidemia and overactive bladder who presents with right facial numbness and dysarthria  Patient woke up yesterday morning feeling dizzy which is normal for her  She typically takes meclizine as part of her morning medications which helps  Yesterday it did not  She reports dropping her pen twice when writing and having fdifficulty eating her salad, eventually dropping it on her lap  After this patient decided to take a nap  She slept for 3 hours and woke up feeling worse  That time she started feeling numbness in the right side of her face  She called her daughter who came over and noted slurring of speech as well as word-finding difficulties  This lasted into the night  On arrival to the emergency department blood pressure 178/77 with remainder of vitals normal   CT head with a hypodensity along the right basal ganglia  CTA showing moderate to severe stenosis of the right M1 segment  Patients aphasia and dysarthria lasted into the night  This morning reports resolution of her symptoms with residual fogginess  Patient denies any history of stroke  She is not on a daily aspirin or Plavix  She is on pravastatin 10 mg  Reports good control of her diabetes and her blood pressure  Patient ambulates with a cane at baseline  Unable to do MRI because of spinal cord stimulator placed many years ago               Inpatient consult to Neurology     Performed by  Edith Mims MD     Authorized by Daysi Garcia DO              Historical Information   Past Medical History:   Diagnosis Date    Acid reflux     Anemia     hx of iron-deficient    Anxiety     Arthritis     Asthma     last needed inhaler last year 2020    Basal cell carcinoma     upper lip    [facility] narcotic dependence ([facility] [address] [facility]     Chronic pain     Colon polyp     Cystocele     [facility] mellitus [facility] [address] [facility]     stable    Disease of thyroid gland     hypothyroidism    Diverticulosis     Dysfunctional uterine bleeding     last assessed - [id number]    Fibromyalgia     Gastric ulcer     Gastroparesis     History of colonic polyps     last assessed - [id number]    History of gastroesophageal reflux (GERD)     Hypercholesterolemia     Hyperlipidemia     Hypertension     IBS (irritable bowel syndrome)     Post laminectomy syndrome     Seasonal allergies     Spinal stenosis      Past Surgical History:   Procedure Laterality Date    APPENDECTOMY      BACK SURGERY      BREAST CYST ASPIRATION Left     pt unsure what exactly was removed    CHOLECYSTECTOMY      COLONOSCOPY      ESOPHAGOGASTRODUODENOSCOPY N/A 9/28/2016    Procedure: ESOPHAGOGASTRODUODENOSCOPY (EGD); Surgeon: Diamond Chan MD;  [facility]; Service:     HERNIA REPAIR      HYSTERECTOMY      TTAH-BSO age 25   [facility] LAMINECTOMY      LUMBAR LAMINECTOMY      OOPHORECTOMY      age 25   [facility] ARTHRODESIS POSTERIOR/POSTEROLATERAL THORACIC N/A 6/4/2018    Procedure: Reopening of lumbar incision for T12-L5 posterior instrumented fixation and fusion and T12-L4 posterior decompression;  Surgeon: Megan Wu MD;  [facility];  Service: Neurosurgery    VA COLONOSCOPY FLX DX W/COLLJ SPEC WHEN PFRMD N/A 3/2/2016    Procedure: EGD AND COLONOSCOPY;  Surgeon: Diamond Chan MD;  [facility]; Service: Gastroenterology    VA DILATE ESOPHAGUS N/A 9/28/2016    Procedure: DILATATION ESOPHAGEAL;  Surgeon: Diamond Chan MD;  [facility]; Service: Gastroenterology    VA ESOPHAGOGASTRODUODENOSCOPY TRANSORAL DIAGNOSTIC N/A 7/18/2016    Procedure: ESOPHAGOGASTRODUODENOSCOPY (EGD); Surgeon: Diamond Chan MD;  [facility];   Service: Gastroenterology    VA IMPLANT SPINAL NEUROSTIM/ Left 6/4/2018    Procedure: removal of left buttock implantable pulse generator and placement of new  implantable pulse generator;  Surgeon: Ashly Sebastian MD;  [facility];  Service: Neurosurgery     Social History   Social History     Substance and Sexual Activity   Alcohol Use Not Currently    Comment: Denied history of alcohol use     Social History     Substance and Sexual Activity   Drug Use No    Comment: Denied history of drug use     E-Cigarette/Vaping    E-Cigarette Use Never User      E-Cigarette/Vaping Substances    Nicotine No     THC No     CBD No     Flavoring No     Other No     Unknown No      Social History     Tobacco Use   Smoking Status Former Smoker    Types: Cigarettes    Quit date: 1970    Years since quittin 3   Smokeless Tobacco Never Used   Tobacco Comment    Denied history of current ever day smoker, Former smoker and Never smoker all documented in [employer]     Family History: non-contributory  Meds/Allergies   all current active meds have been reviewed  Allergies   Allergen Reactions    Penicillins Anaphylaxis, Hives and Other (See Comments)     Other reaction(s): Unknown Reaction    Sulfa Antibiotics Anaphylaxis and Other (See Comments)     Other reaction(s): Unknown Reaction    Aspartame - Food Allergy Other (See Comments) and Hypertension     Slurred speech, weakness, stroke sx    Iodinated Diagnostic Agents Hives     Pt has taken prep prior for contrast and has not had any break through reaction    Keflex [Cephalexin] Hives       Review of previous medical records was  completed  Review of Systems   Constitutional: Negative for fever  Neurological: Positive for dizziness and speech difficulty  Negative for weakness  Psychiatric/Behavioral: Positive for confusion  OBJECTIVE     Patient ID: Aysha Augustine is a 76 y o  female      Vitals:   Vitals:    22 0200 22 0300 22 0400 22 0726   BP: 144/72 157/72 148/68 167/74   BP Location: Left arm Left arm Left arm Right arm   Pulse: 83 78 80 89   Resp: 18 16 18 18   Temp:    97 8 °F (36 6 °C)   TempSrc:    Oral   SpO2: 98% 99% 97% 97%   Weight:          Body mass index is 38 97 kg/m²  Intake/Output Summary (Last 24 hours) at 2022 1333  Last data filed at 2022 0601  Gross per 24 hour   Intake --   Output 400 ml   Net -400 ml       Temperature:   Temp (24hrs), Av 8 °F (36 6 °C), Min:97 6 °F (36 4 °C), Max:98 °F (36 7 °C)    Temperature: 97 8 °F (36 6 °C)    Invasive Devices: Invasive Devices  Report    Peripheral Intravenous Line            Peripheral IV 22 Left Antecubital <1 day    Peripheral IV 22 Right Antecubital <1 day                Physical Exam  Eyes:      Extraocular Movements: EOM normal    Neurological:      Mental Status: She is oriented to person, place, and time  Coordination: Finger-Nose-Finger Test normal           Neurologic Exam     Mental Status   Oriented to person, place, and time  Oriented to place, month, year, not date   able to identify objects, repeat and read     Cranial Nerves     CN II   Visual fields full to confrontation  CN III, IV, VI   Extraocular motions are normal      CN V   Facial sensation intact  CN VII   Facial expression full, symmetric  CN VIII   CN VIII normal      CN IX, X   CN IX normal    CN X normal      CN XI   CN XI normal     No dysarthria     Motor Exam  Normal strength throughout     Sensory Exam    Decreased pinprick in the left distal lower extremity     Gait, Coordination, and Reflexes     Coordination   Finger to nose coordination: normal Trace reflexes throughout        LABORATORY DATA     Labs: I have personally reviewed pertinent reports      Results from last 7 days   Lab Units 22   WBC Thousand/uL 5 79   HEMOGLOBIN g/dL 11 1*   HEMATOCRIT % 35 5   PLATELETS Thousands/uL 232      Results from last 7 days   Lab Units 22  0559 22   POTASSIUM mmol/L 4 2 4 1   CHLORIDE mmol/L 104 102   CO2 mmol/L 29 28   BUN mg/dL 19 17   CREATININE mg/dL 1 16 1 37*   CALCIUM mg/dL 8 7 8 9   ALK PHOS U/L 142*  --    ALT U/L 12  --    AST U/L 17  --      Results from last 7 days   Lab Units 04/23/22  0559   MAGNESIUM mg/dL 1 9     Results from last 7 days   Lab Units 04/23/22  0559   PHOSPHORUS mg/dL 3 9      Results from last 7 days   Lab Units 04/22/22 2010   INR  0 92   PTT seconds 30               IMAGING & DIAGNOSTIC TESTING     Radiology Results: I have personally reviewed pertinent reports  CTA stroke alert (head/neck)   Final Result by Ally Teixeira MD (04/22 2040)      Moderate to severe nonocclusive right M1 segment stenosis  I personally discussed this study with neurologist on 4/22/2022 at 8:24 PM                         Workstation performed: [id number]         CT stroke alert brain   Final Result by Ally Teixeira MD (04/22 2030)      No evidence of acute intracranial hemorrhage  Right basal ganglia low-attenuation age-indeterminate ischemia, correlate with noncontrast brain MRI  I personally discussed this study with neurologist on 4/22/2022 at 8:24 PM                 Workstation performed: [id number]             Other Diagnostic Testing: I have personally reviewed pertinent reports        ACTIVE MEDICATIONS     Current Facility-Administered Medications   Medication Dose Route Frequency    albuterol (PROVENTIL HFA,VENTOLIN HFA) inhaler 2 puff  2 puff Inhalation Q6H PRN    aspirin chewable tablet 81 mg  81 mg Oral Daily    clopidogrel (PLAVIX) tablet 75 mg  75 mg Oral Daily    enoxaparin (LOVENOX) subcutaneous injection 30 mg  30 mg Subcutaneous Daily    insulin lispro (HumaLOG) 100 units/mL subcutaneous injection 1-6 Units  1-6 Units Subcutaneous TID AC    labetalol (NORMODYNE) injection 10 mg  10 mg Intravenous Q6H PRN    latanoprost (XALATAN) 0 005 % ophthalmic solution 1 drop  1 drop Both Eyes HS    levothyroxine tablet 37 5 mcg  37 5 mcg Oral Early Morning    nitrofurantoin (MACRODANTIN) capsule 50 mg  50 mg Oral HS    oxybutynin (DITROPAN-XL) 24 hr tablet 5 mg  5 mg Oral Daily    oxyCODONE-acetaminophen (PERCOCET) 5-325 mg per tablet 1 tablet  1 tablet Oral Q6H PRN    pantoprazole (PROTONIX) EC tablet 40 mg  40 mg Oral Early Morning    pravastatin (PRAVACHOL) tablet 10 mg  10 mg Oral HS       Prior to Admission medications    Medication Sig Start Date End Date Taking?  Authorizing Provider   albuterol (PROVENTIL HFA,VENTOLIN HFA) 90 mcg/act inhaler Inhale 2 puffs every 6 (six) hours as needed for wheezing or shortness of breath 5/11/20   Hector Baires DO   ALPRAZolam Booker ) 0 25 mg tablet Take 1 tablet (0 25 mg total) by mouth 3 (three) times a day as needed for anxiety 1/3/22   [facility] Oats, NANY   baclofen 10 mg tablet TAKE ONE TABLET BY MOUTH THREE TIMES DAILY AS NEEDED FOR MUSCLE SPASM 2/28/22   [facility]ats, NANY   dicyclomine (BENTYL) 20 mg tablet Take 1 tablet (20 mg total) by mouth every 6 (six) hours as needed (bowel spasm) 11/23/21   [facility] NANY Mcneill   estradiol (ESTRACE VAGINAL) 0 1 mg/g vaginal cream Insert 1 g into the vagina 3 (three) times a week 3/28/22   Ashleigh Kaur PA-C   gabapentin (NEURONTIN) 300 mg capsule TAKE ONE CAPSULE BY MOUTH DAILY IN MORNING AND TWO CAPSULES BY MOUTH AT BEDTIME 1/27/22   NANY Keller   glucose blood test strip Bid testing 2/27/19   Hector Baires DO   latanoprost (XALATAN) 0 005 % ophthalmic solution Administer 1 drop to both eyes daily at bedtime 10/2/20 7/20/21  Hector Baires DO   levothyroxine 25 mcg tablet Take 1 5 tablets (37 5 mcg total) by mouth daily 8/9/21 9/8/21  Hector Baires DO   lisinopril (ZESTRIL) 20 mg tablet Take 1 tablet (20 mg total) by mouth daily 6/2/21 9/30/21  NANY Keller   meclizine (ANTIVERT) 25 mg tablet Take 1 tablet (25 mg total) by mouth 4 (four) times a day as needed for dizziness 3/23/22 5/2/22  NANY Keller   metFORMIN (GLUCOPHAGE) 1000 MG tablet Take 1 tablet (1,000 mg total) by mouth 2 (two) times a day with meals 3/9/22   Sinan Merary, NANY   metoclopramide (REGLAN) 10 mg tablet Take 1 tablet (10 mg total) by mouth 4 (four) times a day 6/24/21   [facility], NANY   Milnacipran HCl (Savella) 100 MG TABS Take 1 tablet (100 mg total) by mouth daily 9/13/21   Odella Herd Broadt, DO   nitrofurantoin (MACRODANTIN) 50 mg capsule Take 1 capsule (50 mg total) by mouth daily at bedtime 9/10/21   Odella Herd Broadt, DO   ondansetron [facility]) 8 mg tablet Take 1 tablet (8 mg total) by mouth every 8 (eight) hours as needed for nausea or vomiting 3/9/22   [facility], NANY   oxybutynin (DITROPAN) 5 mg tablet Take 1 tablet (5 mg total) by mouth 3 (three) times a day 2/24/22   Sinan Merary, NANY   oxyCODONE-acetaminophen (PERCOCET)  mg per tablet Take 1 tablet by mouth every 6 (six) hours as needed for severe pain Max Daily Amount: 4 tablets 4/20/22   Sinan Merary, NANY   pantoprazole (PROTONIX) 40 mg tablet Take 1 tablet (40 mg total) by mouth daily 7/22/21 10/20/21  Delia Coyle MD   phenazopyridine (PYRIDIUM) 100 mg tablet Take 1 tablet (100 mg total) by mouth 3 (three) times a day as needed for bladder spasms 11/12/21   [facility], NANY   pravastatin (PRAVACHOL) 10 mg tablet Take 1 tablet (10 mg total) by mouth daily at bedtime 3/4/22   [facility], NANY   trospium chloride (SANCTURA) 20 mg tablet Take 1 tablet (20 mg total) by mouth 2 (two) times a day 3/28/22   Ashleigh Kaur PA-C   zolpidem (AMBIEN) 5 mg tablet Take 1 tablet (5 mg total) by mouth daily at bedtime as needed for sleep 4/20/22   Sinan Merary, CRNP         CODE STATUS & ADVANCED DIRECTIVES     Code Status: Level 1 - Full Code  Advance Directive and Living Will:      Power of :    POLST:        VTE Pharmacologic Prophylaxis: Enoxaparin (Lovenox)  VTE Mechanical Prophylaxis: sequential compression device    ==  MD Cecilia Rucker's Neurology Residency, PGY-3

## 2022-04-23 NOTE — ED PROVIDER NOTES
History  Chief Complaint   Patient presents with    CVA/TIA-like Symptoms     patient presents from home with slurred speech, slight global aphasia and right sided weakness  Last known well last night     Patient is a 64-year-old female who presents with stroke-like symptoms  Patient states that she developed speech difficulty this afternoon  She describes slurred speech as well as difficulty with word finding  She also describes right hand weakness and dropping several objects due to this weakness  She notes that this started at lunchtime, around 2 p m     The speech difficulty started prior to lunch  She thought the symptoms would resolve but they did not  She mentioned something to her daughter on the phone who also recognized the speech difficulty  History provided by:  Patient  Neurologic Problem  Location:  Speech difficulty, right hand weakness  Timing:  Constant  Progression:  Unchanged  Chronicity:  New  Associated symptoms: no abdominal pain, no chest pain, no cough, no diarrhea, no fever, no headaches, no nausea, no rash, no shortness of breath, no sore throat and no vomiting        Prior to Admission Medications   Prescriptions Last Dose Informant Patient Reported? Taking?    ALPRAZolam (XANAX) 0 25 mg tablet  Self No No   Sig: Take 1 tablet (0 25 mg total) by mouth 3 (three) times a day as needed for anxiety   Milnacipran HCl (Savella) 100 MG TABS  Self No No   Sig: Take 1 tablet (100 mg total) by mouth daily   albuterol (PROVENTIL HFA,VENTOLIN HFA) 90 mcg/act inhaler  Self No No   Sig: Inhale 2 puffs every 6 (six) hours as needed for wheezing or shortness of breath   baclofen 10 mg tablet  Self No No   Sig: TAKE ONE TABLET BY MOUTH THREE TIMES DAILY AS NEEDED FOR MUSCLE SPASM   dicyclomine (BENTYL) 20 mg tablet  Self No No   Sig: Take 1 tablet (20 mg total) by mouth every 6 (six) hours as needed (bowel spasm)   estradiol (ESTRACE VAGINAL) 0 1 mg/g vaginal cream   No No   Sig: Insert 1 g into the vagina 3 (three) times a week   gabapentin (NEURONTIN) 300 mg capsule  Self No No   Sig: TAKE ONE CAPSULE BY MOUTH DAILY IN MORNING AND TWO CAPSULES BY MOUTH AT BEDTIME   glucose blood test strip  Self No No   Sig: Bid testing   latanoprost (XALATAN) 0 005 % ophthalmic solution  Child No No   Sig: Administer 1 drop to both eyes daily at bedtime   levothyroxine 25 mcg tablet   No No   Sig: Take 1 5 tablets (37 5 mcg total) by mouth daily   lisinopril (ZESTRIL) 20 mg tablet  Self No No   Sig: Take 1 tablet (20 mg total) by mouth daily   meclizine (ANTIVERT) 25 mg tablet  Self No No   Sig: Take 1 tablet (25 mg total) by mouth 4 (four) times a day as needed for dizziness   metFORMIN (GLUCOPHAGE) 1000 MG tablet  Self No No   Sig: Take 1 tablet (1,000 mg total) by mouth 2 (two) times a day with meals   metoclopramide (REGLAN) 10 mg tablet  Self No No   Sig: Take 1 tablet (10 mg total) by mouth 4 (four) times a day   nitrofurantoin (MACRODANTIN) 50 mg capsule  Self No No   Sig: Take 1 capsule (50 mg total) by mouth daily at bedtime   ondansetron (ZOFRAN) 8 mg tablet  Self No No   Sig: Take 1 tablet (8 mg total) by mouth every 8 (eight) hours as needed for nausea or vomiting   oxyCODONE-acetaminophen (PERCOCET)  mg per tablet   No No   Sig: Take 1 tablet by mouth every 6 (six) hours as needed for severe pain Max Daily Amount: 4 tablets   oxybutynin (DITROPAN) 5 mg tablet  Self No No   Sig: Take 1 tablet (5 mg total) by mouth 3 (three) times a day   pantoprazole (PROTONIX) 40 mg tablet   No No   Sig: Take 1 tablet (40 mg total) by mouth daily   phenazopyridine (PYRIDIUM) 100 mg tablet  Self No No   Sig: Take 1 tablet (100 mg total) by mouth 3 (three) times a day as needed for bladder spasms   pravastatin (PRAVACHOL) 10 mg tablet  Self No No   Sig: Take 1 tablet (10 mg total) by mouth daily at bedtime   trospium chloride (SANCTURA) 20 mg tablet   No No   Sig: Take 1 tablet (20 mg total) by mouth 2 (two) times a day   zolpidem (AMBIEN) 5 mg tablet   No No   Sig: Take 1 tablet (5 mg total) by mouth daily at bedtime as needed for sleep      Facility-Administered Medications: None       Past Medical History:   Diagnosis Date    Acid reflux     Anemia     hx of iron-deficient    Anxiety     Arthritis     Asthma     last needed inhaler last year 2020    Basal cell carcinoma     upper lip    Chronic narcotic dependence (HCC)     Chronic pain     Colon polyp     Cystocele     Diabetes mellitus ([address] )     stable    Disease of thyroid gland     hypothyroidism    Diverticulosis     Dysfunctional uterine bleeding     last assessed - [id number]    Fibromyalgia     Gastric ulcer     Gastroparesis     History of colonic polyps     last assessed - [id number]    History of gastroesophageal reflux (GERD)     Hypercholesterolemia     Hyperlipidemia     Hypertension     IBS (irritable bowel syndrome)     Post laminectomy syndrome     Seasonal allergies     Spinal stenosis        Past Surgical History:   Procedure Laterality Date    APPENDECTOMY      BACK SURGERY      BREAST CYST ASPIRATION Left     pt unsure what exactly was removed    CHOLECYSTECTOMY      COLONOSCOPY      ESOPHAGOGASTRODUODENOSCOPY N/A 9/28/2016    Procedure: ESOPHAGOGASTRODUODENOSCOPY (EGD); Surgeon: Michelle Suggs MD;  [facility]; Service:     HERNIA REPAIR      HYSTERECTOMY      TTAH-BSO age 25   Mikel Hoops LAMINECTOMY      LUMBAR LAMINECTOMY      OOPHORECTOMY      age 25   Mikel Hoops [place] ARTHRODESIS POSTERIOR/POSTEROLATERAL THORACIC N/A 6/4/2018    Procedure: Reopening of lumbar incision for T12-L5 posterior instrumented fixation and fusion and T12-L4 posterior decompression;  Surgeon: Travis Dukes MD;  [facility];  Service: Neurosurgery    NV COLONOSCOPY FLX DX W/COLLJ SPEC WHEN PFRMD N/A 3/2/2016    Procedure: EGD AND COLONOSCOPY;  Surgeon: Michelle Suggs MD;  [facility];   Service: Gastroenterology    NV DILATE ESOPHAGUS N/A 2016    Procedure: DILATATION ESOPHAGEAL;  Surgeon: Dejan Medina MD;  [facility]; Service: Gastroenterology    [place] ESOPHAGOGASTRODUODENOSCOPY TRANSORAL DIAGNOSTIC N/A 2016    Procedure: ESOPHAGOGASTRODUODENOSCOPY (EGD); Surgeon: Dejan Medina MD;  [facility]; Service: Gastroenterology    OH IMPLANT SPINAL NEUROSTIM/ Left 2018    Procedure: removal of left buttock implantable pulse generator and placement of new  implantable pulse generator;  Surgeon: Rito Hernández MD;  [facility];  Service: Neurosurgery       Family History   Problem Relation Age of Onset    Lung cancer Mother 55    Pulmonary embolism Father     Stroke Maternal Grandmother     Heart attack Maternal Grandfather     No Known Problems Paternal Grandmother     No Known Problems Paternal Grandfather     Diabetes Family         Diabetes mellitus    Hypertension Family     Stroke Family         Stroke complications    No Known Problems Daughter     No Known Problems Daughter     No Known Problems Sister     No Known Problems Maternal Aunt      I have reviewed and agree with the history as documented  E-Cigarette/Vaping    E-Cigarette Use Never User      E-Cigarette/Vaping Substances    Nicotine No     THC No     CBD No     Flavoring No     Other No     Unknown No      Social History     Tobacco Use    Smoking status: Former Smoker     Types: Cigarettes     Quit date: 1970     Years since quittin 3    Smokeless tobacco: Never Used    Tobacco comment: Denied history of current ever day smoker, Former smoker and Never smoker all documented in [employer]   Vaping Use    Vaping Use: Never used   Substance Use Topics    Alcohol use: Not Currently     Comment: Denied history of alcohol use    Drug use: No     Comment: Denied history of drug use       Review of Systems   Constitutional: Negative for chills, diaphoresis and fever     HENT: Negative for nosebleeds, sore throat and trouble swallowing  Eyes: Negative for photophobia, pain and visual disturbance  Respiratory: Negative for cough, chest tightness and shortness of breath  Cardiovascular: Negative for chest pain, palpitations and leg swelling  Gastrointestinal: Negative for abdominal pain, constipation, diarrhea, nausea and vomiting  Endocrine: Negative for polydipsia and polyuria  Genitourinary: Negative for difficulty urinating, dysuria, hematuria, pelvic pain, vaginal bleeding and vaginal discharge  Musculoskeletal: Negative for back pain, neck pain and neck stiffness  Skin: Negative for pallor and rash  Neurological: Positive for speech difficulty and weakness  Negative for dizziness, seizures, light-headedness and headaches  All other systems reviewed and are negative  Physical Exam  Physical Exam  Vitals and nursing note reviewed  Constitutional:       General: She is not in acute distress  Appearance: She is well-developed  HENT:      Head: Normocephalic and atraumatic  Eyes:      Pupils: Pupils are equal, round, and reactive to light  Cardiovascular:      Rate and Rhythm: Normal rate and regular rhythm  Pulses: Normal pulses  Heart sounds: Normal heart sounds  Pulmonary:      Effort: Pulmonary effort is normal  No respiratory distress  Breath sounds: Normal breath sounds  Abdominal:      General: There is no distension  Palpations: Abdomen is soft  Abdomen is not rigid  Tenderness: There is no abdominal tenderness  There is no guarding or rebound  Musculoskeletal:         General: No tenderness  Normal range of motion  Cervical back: Normal range of motion and neck supple  Lymphadenopathy:      Cervical: No cervical adenopathy  Skin:     General: Skin is warm and dry  Capillary Refill: Capillary refill takes less than 2 seconds  Neurological:      Mental Status: She is alert and oriented to person, place, and time        GCS: GCS eye subscore is 4  GCS verbal subscore is 5  GCS motor subscore is 6  Cranial Nerves: No cranial nerve deficit  Sensory: Sensory deficit (decreased sensation to light touch in right face) present  Motor: Motor function is intact  Coordination: Coordination is intact  Comments: Mild expressive aphasia  Visual fields intact    Cerebellar exam normal          Vital Signs  ED Triage Vitals   Temperature Pulse Respirations Blood Pressure SpO2   04/22/22 2005 04/22/22 2005 04/22/22 2005 04/22/22 2005 04/22/22 2005   98 °F (36 7 °C) 87 18 (!) 178/77 98 %      Temp Source Heart Rate Source Patient Position - Orthostatic VS BP Location FiO2 (%)   04/22/22 2005 04/22/22 2005 04/22/22 2005 04/22/22 2005 --   Oral Monitor Lying Right arm       Pain Score       04/23/22 0100       No Pain           Vitals:    04/23/22 0200 04/23/22 0300 04/23/22 0400 04/23/22 0726   BP: 144/72 157/72 148/68 167/74   Pulse: 83 78 80 89   Patient Position - Orthostatic VS:   Lying Lying         Visual Acuity  Visual Acuity      Most Recent Value   L Pupil Size (mm) 3   R Pupil Size (mm) 3   L Pupil Shape Round   R Pupil Shape Round          ED Medications  Medications   albuterol (PROVENTIL HFA,VENTOLIN HFA) inhaler 2 puff (2 puffs Inhalation Given 4/23/22 0921)   latanoprost (XALATAN) 0 005 % ophthalmic solution 1 drop (1 drop Both Eyes Given 4/23/22 0016)   levothyroxine tablet 37 5 mcg (37 5 mcg Oral Given 4/23/22 0519)   pantoprazole (PROTONIX) EC tablet 40 mg (40 mg Oral Given 4/23/22 0519)   pravastatin (PRAVACHOL) tablet 10 mg (10 mg Oral Given 4/23/22 0016)   nitrofurantoin (MACRODANTIN) capsule 50 mg (50 mg Oral Given 4/23/22 0016)   aspirin chewable tablet 81 mg (81 mg Oral Given 4/23/22 0920)   clopidogrel (PLAVIX) tablet 75 mg (75 mg Oral Given 4/23/22 0920)   labetalol (NORMODYNE) injection 10 mg (has no administration in time range)   enoxaparin (LOVENOX) subcutaneous injection 30 mg (30 mg Subcutaneous Given 4/23/22 0920)   insulin lispro (HumaLOG) 100 units/mL subcutaneous injection 1-6 Units (2 Units Subcutaneous Given 4/23/22 0920)   oxyCODONE-acetaminophen (PERCOCET) 5-325 mg per tablet 1 tablet (has no administration in time range)   oxybutynin (DITROPAN-XL) 24 hr tablet 5 mg (has no administration in time range)   diphenhydrAMINE (BENADRYL) injection 25 mg (25 mg Intravenous Given 4/22/22 2025)   iohexol (OMNIPAQUE) 350 MG/ML injection (SINGLE-DOSE) 85 mL (85 mL Intravenous Given 4/22/22 2019)   clopidogrel (PLAVIX) tablet 300 mg (300 mg Oral Given 4/22/22 2142)   aspirin (ECOTRIN LOW STRENGTH) EC tablet 324 mg (324 mg Oral Given 4/22/22 2149)       Diagnostic Studies  Results Reviewed     Procedure Component Value Units Date/Time    Comprehensive metabolic panel [[id number]]  (Abnormal) Collected: 04/23/22 0559    Lab Status: Final result Specimen: Blood from Arm, Right Updated: 04/23/22 0700     Sodium 140 mmol/L      Potassium 4 2 mmol/L      Chloride 104 mmol/L      CO2 29 mmol/L      ANION GAP 7 mmol/L      BUN 19 mg/dL      Creatinine 1 16 mg/dL      Glucose 150 mg/dL      Glucose, Fasting 150 mg/dL      Calcium 8 7 mg/dL      Corrected Calcium 9 5 mg/dL      AST 17 U/L      ALT 12 U/L      Alkaline Phosphatase 142 U/L      Total Protein 6 3 g/dL      Albumin 3 0 g/dL      Total Bilirubin 0 29 mg/dL      eGFR 44 ml/min/1 73sq m     Narrative:      [place] guidelines for Chronic Kidney Disease (CKD):     Stage 1 with normal or high GFR (GFR > 90 mL/min/1 73 square meters)    Stage 2 Mild CKD (GFR = 60-89 mL/min/1 73 square meters)    Stage 3A Moderate CKD (GFR = 45-59 mL/min/1 73 square meters)    Stage 3B Moderate CKD (GFR = 30-44 mL/min/1 73 square meters)    Stage 4 Severe CKD (GFR = 15-29 mL/min/1 73 square meters)    Stage 5 End Stage CKD (GFR <15 mL/min/1 73 square meters)  Note: GFR calculation is accurate only with a steady state creatinine    Magnesium [[id number]] (Normal) Collected: 04/23/22 0559    Lab Status: Final result Specimen: Blood from Arm, Right Updated: 04/23/22 0700     Magnesium 1 9 mg/dL     Phosphorus [[id number]]  (Normal) Collected: 04/23/22 0559    Lab Status: Final result Specimen: Blood from Arm, Right Updated: 04/23/22 0700     Phosphorus 3 9 mg/dL     Lipid Panel with Direct LDL reflex [[id number]]  (Abnormal) Collected: 04/23/22 0559    Lab Status: Final result Specimen: Blood from Arm, Right Updated: 04/23/22 0700     Cholesterol 159 mg/dL      Triglycerides 97 mg/dL      HDL, Direct 48 mg/dL      LDL Calculated 92 mg/dL     Hemoglobin A1c w/EAG Estimation [[id number]] Collected: 04/22/22 2010    Lab Status: In process Specimen: Blood from Arm, Left Updated: 04/23/22 0413    HS Troponin I 4hr [[id number]]  (Normal) Collected: 04/23/22 0133    Lab Status: Final result Specimen: Blood from Arm, Left Updated: 04/23/22 0213     hs TnI 4hr 18 ng/L      Delta 4hr hsTnI 5 ng/L     HS Troponin I 2hr [[id number]]  (Normal) Collected: 04/22/22 2238    Lab Status: Final result Specimen: Blood from Arm, Right Updated: 04/22/22 2316     hs TnI 2hr 15 ng/L      Delta 2hr hsTnI 2 ng/L     COVID/FLU/RSV - 2 hour TAT [[id number]]  (Normal) Collected: 04/22/22 2150    Lab Status: Final result Specimen: Nares from Nose Updated: 04/22/22 2234     SARS-CoV-2 Negative     INFLUENZA A PCR Negative     INFLUENZA B PCR Negative     RSV PCR Negative    Narrative:      FOR PEDIATRIC PATIENTS - copy/paste COVID Guidelines URL to browser: [email or web address]    SARS-CoV-2 assay is a Nucleic Acid Amplification assay intended for the  qualitative detection of nucleic acid from SARS-CoV-2 in nasopharyngeal  swabs  Results are for the presumptive identification of SARS-CoV-2 RNA      Positive results are indicative of infection with SARS-CoV-2, the virus  causing COVID-19, but do not rule out bacterial infection or co-infection  with other viruses  Laboratories within the [place] and its  territories are required to report all positive results to the appropriate  public health authorities  Negative results do not preclude SARS-CoV-2  infection and should not be used as the sole basis for treatment or other  patient management decisions  Negative results must be combined with  clinical observations, patient history, and epidemiological information  This test has not been FDA cleared or approved  This test has been authorized by FDA under an Emergency Use Authorization  (EUA)  This test is only authorized for the duration of time the  declaration that circumstances exist justifying the authorization of the  emergency use of an in vitro diagnostic tests for detection of SARS-CoV-2  virus and/or diagnosis of COVID-19 infection under section 564(b)(1) of  the Act, 21 U  S C  932JXY-9(E)(7), unless the authorization is terminated  or revoked sooner  The test has been validated but independent review by FDA  and CLIA is pending  Test performed using [employer] GeneXpert: This RT-PCR assay targets N2,  a region unique to SARS-CoV-2  A conserved region in the E-gene was chosen  for pan-Sarbecovirus detection which includes SARS-CoV-2      HS Troponin 0hr (reflex protocol) [[id number]]  (Normal) Collected: 04/22/22 2010    Lab Status: Final result Specimen: Blood from Arm, Left Updated: 04/22/22 2141     hs TnI 0hr 13 ng/L     Basic metabolic panel [[id number]]  (Abnormal) Collected: 04/22/22 2010    Lab Status: Final result Specimen: Blood from Arm, Left Updated: 04/22/22 2053     Sodium 139 mmol/L      Potassium 4 1 mmol/L      Chloride 102 mmol/L      CO2 28 mmol/L      ANION GAP 9 mmol/L      BUN 17 mg/dL      Creatinine 1 37 mg/dL      Glucose 192 mg/dL      Calcium 8 9 mg/dL      eGFR 36 ml/min/1 73sq m     Narrative:      [place] guidelines for Chronic Kidney Disease (CKD):     Stage 1 with normal or high GFR (GFR > 90 mL/min/1 73 square meters)    Stage 2 Mild CKD (GFR = 60-89 mL/min/1 73 square meters)    Stage 3A Moderate CKD (GFR = 45-59 mL/min/1 73 square meters)    Stage 3B Moderate CKD (GFR = 30-44 mL/min/1 73 square meters)    Stage 4 Severe CKD (GFR = 15-29 mL/min/1 73 square meters)    Stage 5 End Stage CKD (GFR <15 mL/min/1 73 square meters)  Note: GFR calculation is accurate only with a steady state creatinine    Protime-INR [[id number]]  (Normal) Collected: 04/22/22 2010    Lab Status: Final result Specimen: Blood from Arm, Left Updated: 04/22/22 2044     Protime 12 4 seconds      INR 0 92    APTT [[id number]]  (Normal) Collected: 04/22/22 2010    Lab Status: Final result Specimen: Blood from Arm, Left Updated: 04/22/22 2044     PTT 30 seconds     CBC and Platelet [[id number]]  (Abnormal) Collected: 04/22/22 2010    Lab Status: Final result Specimen: Blood from Arm, Left Updated: 04/22/22 2036     WBC 5 79 Thousand/uL      RBC 4 07 Million/uL      Hemoglobin 11 1 g/dL      Hematocrit 35 5 %      MCV 87 fL      MCH 27 3 pg      MCHC 31 3 g/dL      RDW 13 8 %      Platelets 916 Thousands/uL      MPV 11 0 fL     Fingerstick Glucose (POCT) [[id number]]  (Abnormal) Collected: 04/22/22 2001    Lab Status: Final result Updated: 04/22/22 2004     POC Glucose 168 mg/dl                  CTA stroke alert (head/neck)   Final Result by Agustín Rai MD (04/22 2040)      Moderate to severe nonocclusive right M1 segment stenosis  I personally discussed this study with neurologist on 4/22/2022 at 8:24 PM                         Workstation performed: [id number]         CT stroke alert brain   Final Result by Agustín Rai MD (04/22 2030)      No evidence of acute intracranial hemorrhage  Right basal ganglia low-attenuation age-indeterminate ischemia, correlate with noncontrast brain MRI                  I personally discussed this study with neurologist on 4/22/2022 at 8:24 PM                 Workstation performed: [id number]                    Procedures  ECG 12 Lead Documentation Only    Date/Time: 4/22/2022 11:23 PM  Performed by: Daysi Garcia DO  Authorized by: Daysi Garcia DO     ECG reviewed by me, the ED Provider: yes    Patient location:  ED  Previous ECG:     Previous ECG:  Compared to current    Similarity:  No change    Comparison to cardiac monitor: Yes    Comments:      Normal sinus rhythm at a rate of 100 beats per minute  Left axis deviation  Left bundle branch block  Negative Sgarbossa criteria  Similar to previous from 05/10/2021  ED Course  ED Course as of 04/23/22 1037   Fri Apr 22, 2022 2027 Spoke with Dr Qian Mccrary, neurology, will review imaging  Stroke Assessment     Row Name 04/22/22 1032             NIH Stroke Scale    Interval Baseline      Level of Consciousness (1a ) 0      LOC Questions (1b ) 0      LOC Commands (1c ) 0      Best Gaze (2 ) 0      Visual (3 ) 0      Facial Palsy (4 ) 0      Motor Arm, Left (5a ) 0      Motor Arm, Right (5b ) 0      Motor Leg, Left (6a ) 0      Motor Leg, Right (6b ) 0      Limb Ataxia (7 ) 0      Sensory (8 ) 1      Best Language (9 ) 1      Dysarthria (10 ) 0      Extinction and Inattention (11 ) (Formerly Neglect) 0      Total 2                Most Recent Value   TPA Decision Options    TPA Decision Patient not a TPA candidate  Patient is not a candidate options Unclear time of onset outside appropriate time window  MDM  Number of Diagnoses or Management Options  Stroke [facility]): new and requires workup  Diagnosis management comments: Patient presents with speech difficulty and weakness in her right hand  Unfortunately, symptoms started before noon today which puts her outside of the window for IV tPA  Her current NIHSS = 2  Stroke alert was called because symptoms started less than 24 hours ago  Neurology reviewed CTA and there is no indication for endovascular therapy    Neurology recommends hospitalization and dual anti-platelet therapy  This was discussed with patient and daughter who are in agreement  Portions of the above record have been created with voice recognition software   Occasional wrong word or "sound alike" substitutions may have occurred due to the inherent limitations of voice recognition software   Read the chart carefully and recognize, using context, where substitutions may have occurred  Amount and/or Complexity of Data Reviewed  Clinical lab tests: ordered and reviewed  Tests in the radiology section of CPT®: ordered and reviewed  Tests in the medicine section of CPT®: ordered and reviewed  Review and summarize past medical records: yes  Discuss the patient with other providers: yes  Independent visualization of images, tracings, or specimens: yes    Risk of Complications, Morbidity, and/or Mortality  Presenting problems: high  Diagnostic procedures: high  Management options: moderate    Patient Progress  Patient progress: stable      Disposition  Final diagnoses:   Stroke [facility])     Time reflects when diagnosis was documented in both MDM as applicable and the Disposition within this note     Time User Action Codes Description Comment    4/22/2022  8:11 PM Elieser Desai Add [I63 9] Stroke ([address] )     4/23/2022 12:04 AM Maricarmen Myers Add [R29 90] Stroke-like symptoms       ED Disposition     ED Disposition Condition Date/Time Comment    Admit Stable Fri Apr 22, 2022 10:03 PM Case was discussed with GREG and the patient's admission status was agreed to be Admission Status: observation status to the service of Dr Rosalie Koehler           Follow-up Information    None         Current Discharge Medication List      CONTINUE these medications which have NOT CHANGED    Details   albuterol (PROVENTIL HFA,VENTOLIN HFA) 90 mcg/act inhaler Inhale 2 puffs every 6 (six) hours as needed for wheezing or shortness of breath  Qty: 1 Inhaler, Refills: 5    Comments: Substitution to a formulary equivalent within the same pharmaceutical class is authorized    Associated Diagnoses: Mild intermittent asthma without complication      ALPRAZolam (XANAX) 0 25 mg tablet Take 1 tablet (0 25 mg total) by mouth 3 (three) times a day as needed for anxiety  Qty: 30 tablet, Refills: 0    Associated Diagnoses: Anxiety      baclofen 10 mg tablet TAKE ONE TABLET BY MOUTH THREE TIMES DAILY AS NEEDED FOR MUSCLE SPASM  Qty: 30 tablet, Refills: 0    Associated Diagnoses: Lumbar back pain      dicyclomine (BENTYL) 20 mg tablet Take 1 tablet (20 mg total) by mouth every 6 (six) hours as needed (bowel spasm)  Qty: 120 tablet, Refills: 1    Associated Diagnoses: PUD (peptic ulcer disease)      estradiol (ESTRACE VAGINAL) 0 1 mg/g vaginal cream Insert 1 g into the vagina 3 (three) times a week  Qty: 42 5 g, Refills: 3    Associated Diagnoses: Recurrent UTI      gabapentin (NEURONTIN) 300 mg capsule TAKE ONE CAPSULE BY MOUTH DAILY IN MORNING AND TWO CAPSULES BY MOUTH AT BEDTIME  Qty: 270 capsule, Refills: 0    Associated Diagnoses: Lumbar stenosis with neurogenic claudication      glucose blood test strip Bid testing  Qty: 100 each, Refills: 6    Comments: Dx E11 9 and last office visit 2/27/19  One touch glucometer test strips  Associated Diagnoses: Diabetes 1 5, managed as type 2 (HCC)      latanoprost (XALATAN) 0 005 % ophthalmic solution Administer 1 drop to both eyes daily at bedtime  Qty: 2 5 mL, Refills: 3    Associated Diagnoses: Glaucoma of both eyes, unspecified glaucoma type      levothyroxine 25 mcg tablet Take 1 5 tablets (37 5 mcg total) by mouth daily  Qty: 45 tablet, Refills: 3    Associated Diagnoses: Hypothyroidism, unspecified type      lisinopril (ZESTRIL) 20 mg tablet Take 1 tablet (20 mg total) by mouth daily  Qty: 30 tablet, Refills: 3    Associated Diagnoses: Essential hypertension      meclizine (ANTIVERT) 25 mg tablet Take 1 tablet (25 mg total) by mouth 4 (four) times a day as needed for dizziness  Qty: 60 tablet, Refills: 3    Associated Diagnoses: Vertigo      metFORMIN (GLUCOPHAGE) 1000 MG tablet Take 1 tablet (1,000 mg total) by mouth 2 (two) times a day with meals  Qty: 180 tablet, Refills: 3    Associated Diagnoses: Diabetes 1 5, managed as type 2 (HCC)      metoclopramide (REGLAN) 10 mg tablet Take 1 tablet (10 mg total) by mouth 4 (four) times a day  Qty: 120 tablet, Refills: 3    Comments: Change in dose  Associated Diagnoses: Gastroparesis      Milnacipran HCl (Savella) 100 MG TABS Take 1 tablet (100 mg total) by mouth daily  Qty: 30 tablet, Refills: 3    Associated Diagnoses: Fibromyalgia      nitrofurantoin (MACRODANTIN) 50 mg capsule Take 1 capsule (50 mg total) by mouth daily at bedtime  Qty: 30 capsule, Refills: 3    Associated Diagnoses: Spinal stenosis of lumbar region, unspecified whether neurogenic claudication present      ondansetron (ZOFRAN) 8 mg tablet Take 1 tablet (8 mg total) by mouth every 8 (eight) hours as needed for nausea or vomiting  Qty: 20 tablet, Refills: 3    Associated Diagnoses: Nausea and vomiting, intractability of vomiting not specified, unspecified vomiting type      oxybutynin (DITROPAN) 5 mg tablet Take 1 tablet (5 mg total) by mouth 3 (three) times a day  Qty: 90 tablet, Refills: 0    Associated Diagnoses: Overactive bladder      oxyCODONE-acetaminophen (PERCOCET)  mg per tablet Take 1 tablet by mouth every 6 (six) hours as needed for severe pain Max Daily Amount: 4 tablets  Qty: 100 tablet, Refills: 0    Associated Diagnoses: Lumbar back pain; Post laminectomy syndrome      pantoprazole (PROTONIX) 40 mg tablet Take 1 tablet (40 mg total) by mouth daily  Qty: 90 tablet, Refills: 1    Comments: DX Code Needed      Associated Diagnoses: Gastroesophageal reflux disease without esophagitis      phenazopyridine (PYRIDIUM) 100 mg tablet Take 1 tablet (100 mg total) by mouth 3 (three) times a day as needed for bladder spasms  Qty: 30 tablet, Refills: 0 Associated Diagnoses: Urinary tract infection without hematuria, site unspecified      pravastatin (PRAVACHOL) 10 mg tablet Take 1 tablet (10 mg total) by mouth daily at bedtime  Qty: 90 tablet, Refills: 5    Associated Diagnoses: Hypercholesterolemia      trospium chloride (SANCTURA) 20 mg tablet Take 1 tablet (20 mg total) by mouth 2 (two) times a day  Qty: 90 tablet, Refills: 3    Associated Diagnoses: Urinary incontinence, unspecified type      zolpidem (AMBIEN) 5 mg tablet Take 1 tablet (5 mg total) by mouth daily at bedtime as needed for sleep  Qty: 30 tablet, Refills: 3    Associated Diagnoses: Primary insomnia             No discharge procedures on file      PDMP Review       Value Time User    PDMP Reviewed  Yes 4/20/2022 11:13 AM Farheen Batista, [address]           ED Provider  Electronically Signed by           Monroe Sanabria DO  04/23/22 1037

## 2022-04-23 NOTE — SPEECH THERAPY NOTE
Speech/Language status screened (pt speaking with Arnoldo De La Vega so I deferred full evaluation)  She was O to self, place, Arnoldo Lemos if I recall correctly), name and location of [facility] ([address] [place] [address] Rd by the [employer]), [employer]  Sh expressed needs, no s/s dysarthria  Pt denies change in speech/language function  If concerns persist, consider evaluation as an OP or via [facility]

## 2022-04-23 NOTE — OCCUPATIONAL THERAPY NOTE
Occupational Therapy Evaluation     Patient Name: Yann CONLEY Date: 4/23/2022  Problem List  Principal Problem:    Stroke-like symptoms  Active Problems:    Type 2 diabetes mellitus with hyperglycemia, without long-term current use of insulin (HCC)    Hypertension    High cholesterol    Hypothyroidism    Overactive bladder    Past Medical History  Past Medical History:   Diagnosis Date    Acid reflux     Anemia     hx of iron-deficient    Anxiety     Arthritis     Asthma     last needed inhaler last year 2020    Basal cell carcinoma     upper lip    Chronic narcotic dependence (HCC)     Chronic pain     Colon polyp     Cystocele     Diabetes mellitus ([address] )     stable    Disease of thyroid gland     hypothyroidism    Diverticulosis     Dysfunctional uterine bleeding     last assessed - [id number]    Fibromyalgia     Gastric ulcer     Gastroparesis     History of colonic polyps     last assessed - [id number]    History of gastroesophageal reflux (GERD)     Hypercholesterolemia     Hyperlipidemia     Hypertension     IBS (irritable bowel syndrome)     Post laminectomy syndrome     Seasonal allergies     Spinal stenosis      Past Surgical History  Past Surgical History:   Procedure Laterality Date    APPENDECTOMY      BACK SURGERY      BREAST CYST ASPIRATION Left     pt unsure what exactly was removed    CHOLECYSTECTOMY      COLONOSCOPY      ESOPHAGOGASTRODUODENOSCOPY N/A 9/28/2016    Procedure: ESOPHAGOGASTRODUODENOSCOPY (EGD); Surgeon: Vini Guzman MD;  [facility];   Service:     HERNIA REPAIR      HYSTERECTOMY      TTAH-BSO age 25   Cassi Crystal LAMINECTOMY      LUMBAR LAMINECTOMY      OOPHORECTOMY      age 25   Cassi Crystal [place] ARTHRODESIS POSTERIOR/POSTEROLATERAL THORACIC N/A 6/4/2018    Procedure: Reopening of lumbar incision for T12-L5 posterior instrumented fixation and fusion and T12-L4 posterior decompression;  Surgeon: French Morales MD;  [facility];  Service: Neurosurgery    [place] COLONOSCOPY FLX DX W/COLLJ SPEC WHEN PFRMD N/A 3/2/2016    Procedure: EGD AND COLONOSCOPY;  Surgeon: Nguyen Jackson MD;  [facility]; Service: Gastroenterology    [place] DILATE ESOPHAGUS N/A 9/28/2016    Procedure: DILATATION ESOPHAGEAL;  Surgeon: Nguyen Jackson MD;  [facility]; Service: Gastroenterology    [place] ESOPHAGOGASTRODUODENOSCOPY TRANSORAL DIAGNOSTIC N/A 7/18/2016    Procedure: ESOPHAGOGASTRODUODENOSCOPY (EGD); Surgeon: Nguyen Jackson MD;  [facility]; Service: Gastroenterology    [place] IMPLANT SPINAL NEUROSTIM/ Left 6/4/2018    Procedure: removal of left buttock implantable pulse generator and placement of new  implantable pulse generator;  Surgeon: Yousuf Dumas MD;  [facility];  Service: Neurosurgery           04/23/22 1120   OT Last Visit   OT Visit Date 04/23/22   Note Type   Note type Evaluation   Restrictions/Precautions   Other Precautions Chair Alarm; Bed Alarm; Fall Risk;Contact/isolation   Pain Assessment   Pain Assessment Tool 0-10   Pain Score 6   Pain Location/Orientation Location: Back   Home Living   Type of Home Apartment  ([address])   Home Layout One level; Able to live on main level with bedroom/bathroom   Bathroom Shower/Tub Tub/shower unit   Bathroom Toilet Standard   Home Equipment Cane   Additional Comments Patient uses a cane  Patient doesn't drive but has family support for DR appt and food shopping   Prior Function   Level of [place] Independent with ADLs and functional mobility   Lives With Son   Receives Help From Family   ADL Assistance Independent   IADLs Independent   Falls in the last 6 months 1 to 4  (per pt was due to dizziness)   ADL   Eating Assistance 7  Independent   Grooming Assistance 5  Supervision/Setup   Grooming Deficit Wash/dry hands; Wash/dry face   UB Bathing Assistance 5  Supervision/Setup   UB Bathing Deficit Right arm;Left arm   UB Dressing Assistance 5  Supervision/Setup   UB Dressing Deficit Thread RUE;Thread LUE   LB Dressing Assistance 5  Supervision/Setup   LB Dressing Deficit Thread RLE into pants; Thread LLE into pants   Bed Mobility   Supine to Sit 4  Minimal assistance   Additional items Increased time required;Verbal cues   Sit to Supine Unable to assess   Transfers   Sit to Stand 4  Minimal assistance   Additional items Assist x 1; Increased time required;Verbal cues   Stand to Sit 4  Minimal assistance   Additional items Assist x 1; Increased time required;Verbal cues   Functional Mobility   Additional Comments Patient ambulated 5-7 feet with RW at [address] with verbal cues for hand placement  Unable to do more felt comforable with purewick on  and did not want to perform more   Balance   Static Sitting Good   Dynamic Sitting Fair +   Static Standing Fair -   Dynamic Standing Fair -   Activity Tolerance   Activity Tolerance Patient tolerated treatment well   Nurse Made Aware ARLETTE Alegria   RUE Assessment   RUE Assessment WFL   RUE Strength   RUE Overall Strength   (4/5)   LUE Assessment   LUE Assessment WFL   LUE Strength   LUE Overall Strength   (4/5)   Hand Function   Gross Motor Coordination Functional   Fine Motor Coordination Functional   Sensation   Light Touch No apparent deficits   Vision - Complex Assessment   Acuity Able to read clock/calendar on wall without difficulty   Cognition   Orientation Level Oriented X4   Following Commands Follows one step commands with increased time or repetition   Comments Pt ID by wristband name and    Assessment   Limitation Decreased ADL status; Decreased UE strength;Decreased endurance;Decreased self-care trans;Decreased high-level ADLs   Prognosis Fair   Assessment Patient evaluated by Occupational Therapy  Patient admitted with Stroke-like symptoms  Noncontrast head CT which reveals no evidence of intercerebral hemorrhage or large territorial infarction and CT angiogram which reveals no evidence of large vessel occlusion or significant flow limiting stenosis  The patients occupational profile, medical and therapy history includes a expanded review of medical and/or therapy records and additional review of physical, cognitive, or psychosocial history related to current functional performance  Comorbidities affecting functional mobility and ADLS include: anxiety, diabetes, hypertension and hyperlipidemia  Prior to admission, patient was independent with functional mobility with cane, independent with ADLS, independent with IADLS and living with son in a 1 level home with 3  steps to enter  Patient performed grooming and UB bath Sup, UB dressing Sup and lB dressing sup, Bed mobility Min A , transfer Min A and functional ambulation Min A  The evaluation identifies the following performance deficits: weakness, impaired balance, decreased endurance, decreased coordination, increased fall risk, new onset of impairment of functional mobility, decreased ADLS, decreased IADLS and decreased activity tolerance, that result in activity limitations and/or participation restrictions  This evaluation requires clinical decision making of moderate complexity, because the patient may present with comorbidities that affect occupational performance and required minimal or moderate modification of tasks or assistance with the consideration of several treatment options  The Barthel Index was used as a functional outcome tool presenting with a score of Barthel Index Score: 70  The patient's raw score on the -PAC Daily Activity inpatient short form is 19, standardized score is 40 22, greater than 39 4  Patients at this level are likely to benefit from DC to home with Sadi Fajardo  Please refer to the recommendation of the Occupational Therapist for safe DC planning  Patient will benefit from skilled Occupational Therapy services to address above deficits and facilitate a safe return to prior level of function     Goals   Patient Goals "get better"   LTG Time Frame   (8-14)   Long Term Goal #1 Goals established to promote Patient Goals: "get better":  Patient will increase standing tolerance to 6 minutes during ADL task to decrease assistance level and decrease fall risk; Patient will increase bed mobility to supervision in preparation for ADLS and transfers; Patient will increase functional mobility to and from bathroom with rolling walker with supervision to increase performance with ADLS and to use a toilet; Patient will tolerate 10 minutes of UE ROM/strengthening to increase general activity tolerance and performance in ADLS/IADLS; Patient will improve functional activity tolerance to 10 minutes of sustained functional tasks to increase participation in basic self-care and decrease assistance level;    Patient will increase dynamic standing balance to fair+ to improve postural stability and decrease fall risk during standing ADLS and transfers   Functional Transfer Goals   Pt Will Perform All Functional Transfers With mod indep   Pt Will Transfer To Bedside Commode With mod indep   Pt Will Transfer To Toilet With mod indep   Pt Will Transfer To Shower With mod indep   ADL Goals   Pt Will Perform Eating Independently   Pt Will Perform Grooming Independently   Pt Will Perform Bathing With mod indep   Pt Will Perform UE Dressing With mod indep   Pt Will Perform LE Dressing With mod indep   Pt Will Perform Toileting With mod indep   Plan   Treatment Interventions ADL retraining;Functional transfer training;UE strengthening/ROM; Endurance training;Patient/family training;Neuromuscular reeducation; Compensatory technique education;Continued evaluation; Activityengagement; Energy conservation   Goal Expiration Date 05/07/22   OT Frequency 1-2x/wk   Recommendation   OT Discharge Recommendation Home with home health rehabilitation   AM-PAC Daily Activity Inpatient   Lower Body Dressing 3   Bathing 3   Toileting 3   Upper Body Dressing 3   Grooming 3   Eating 4   Daily Activity Raw Score 19   Daily Activity Standardized Score (Calc for Raw Score >=11) 40 22   AM-PAC Applied Cognition Inpatient   Following a Speech/Presentation 4   Understanding Ordinary Conversation 4   Taking Medications 4   Remembering Where Things Are Placed or Put Away 4   Remembering List of 4-5 Errands 3   Taking Care of Complicated Tasks 3   Applied Cognition Raw Score 22   Applied Cognition Standardized Score 47 83   Barthel Index   Feeding 10   Bathing 5   Grooming Score 0   Dressing Score 5   Bladder Score 10   Bowels Score 10   Toilet Use Score 5   Transfers (Bed/Chair) Score 10   Mobility (Level Surface) Score 10   Stairs Score 5   Barthel Index Score 70   Modified [place] Scale   Modified [place] Scale 1   Emma Franco, OT

## 2022-04-23 NOTE — H&P
Tank Carl 45 1945, 76 y o  female MRN: [id number]  Unit/Bed#: S -01 Encounter: [id number]  Primary Care Provider: NANY Alejandro   Date and time admitted to hospital: 4/22/2022  7:58 PM    * Stroke-like symptoms  Assessment & Plan  · Patient presented with expressive aphasia, generalized fatigue No facial droop,arm weakness or lateralization neurologically  Patient was reported to have s/s in the morning presented to ED in the evening  Unsure of last known well time  · PMH: HTN compliant with medication    Workup:   In the ED, /77  · EKG - NSR Rate 100  · CT of the head - No evidence of acute intracranial hemorrhage  Right basal ganglia low-attenuation age-indeterminate ischemia, correlate with noncontrast brain MRI  · CT of the Neck with contrast - Moderate to severe nonocclusive right M1 segment stenosis  Plan - Stroke pathway:  · ASA 81 mg q d  · Continue home statin therapy  · Echo pending  · Patient cannot have MRI due to MRI incompatible back stimulator device  · Monitor on telemetry  · Lipid Panel, HbA1c pending  · PT/OT/Speech eval  · Allow for permissive hypertension with SBP upto 220 in the first 24 hrs then  140-170 for the next 48 hours  · Neuro checks q 4h times 24 hours  · Neurology consult  Type 2 diabetes mellitus with hyperglycemia, without long-term current use of insulin [facility])  Assessment & Plan  Lab Results   Component Value Date    HGBA1C 7 2 (A) 05/17/2021       Recent Labs     04/22/22 2001   POCGLU 168*       Blood Sugar Average: Last 72 hrs:  (P) 168     Plan  · Hold metformin  · Insulin SS with meals  · POCT glucose checks before meals and at HS  · Follow up Hgb A1C  · Consistent carbohydrate diet  Hypertension  Assessment & Plan  · Hold lisinopril  · Allow permissive hypertension up to  in the 1st 24 hours then  - 170 for the next 48 hours  · Labetalol 10 mg p r n   For SBP > 220     Overactive bladder  Assessment & Plan  · Continue home Myrbetriq  · Call family to bring non-formulary medication  Hypothyroidism  Assessment & Plan  · Continue home levothyroxine 37 5 mcg  High cholesterol  Assessment & Plan  · Continue pravastatin 10 mg q h s  VTE Pharmacologic Prophylaxis: VTE Score: 4 Moderate Risk (Score 3-4) - Pharmacological DVT Prophylaxis Ordered: enoxaparin (Lovenox)  Code Status: Level 1 - Full Code   Discussion with family: Updated  (daughter) at bedside  Anticipated Length of Stay: Patient will be admitted on an observation basis with an anticipated length of stay of less than 2 midnights secondary to Stroke-like symptoms  Chief Complaint: Speech difficulty right face numbness  History of Present Illness: Ramos Caba is a 76 y o  female with a PMH of Hypertension, PUD, type 2 diabetes mellitus, hypothyroidism, hyperlipidemia, overactive bladder who presents with speech difficulties and right face numbness  Symptoms started in the morning with word finding difficulties and right face numbness  Patient also had bilateral lower extremity weakness  The speech difficulty and face numbness seemed to resolve/waxing and waning but lower extremity weakness remained prompting patient to present to the ED late in the evening  In ED evaluation patient noted to be hypertensive with a B/P 178/77  EKG with normal sinus rhythm  Labs unremarkable  CT head and CTA head and neck did not show abnormal findings to explain patient's symptoms  Neurology was consulted and decision was made to admit patient for further evaluation  When seen in the ED patient feels that the speech difficulty and her right sided face numbness is resolved  Patient still has bilateral lower extremity weakness right greater than left  Review of Systems:  Review of Systems   Constitutional: Negative for chills and fever  HENT: Negative for ear pain and sore throat  Eyes: Negative for pain and visual disturbance  Respiratory: Negative for cough and shortness of breath  Cardiovascular: Negative for chest pain and palpitations  Gastrointestinal: Negative for abdominal pain and vomiting  Genitourinary: Negative for dysuria and hematuria  Musculoskeletal: Negative for arthralgias and back pain  Skin: Negative for color change and rash  Neurological: Negative for seizures and syncope  All other systems reviewed and are negative  Past Medical and Surgical History:   Past Medical History:   Diagnosis Date    Acid reflux     Anemia     hx of iron-deficient    Anxiety     Arthritis     Asthma     last needed inhaler last year 2020    Basal cell carcinoma     upper lip    Chronic narcotic dependence (HCC)     Chronic pain     Colon polyp     Cystocele     Diabetes mellitus (HCC)     stable    Disease of thyroid gland     hypothyroidism    Diverticulosis     Dysfunctional uterine bleeding     last assessed - [id number]    Fibromyalgia     Gastric ulcer     Gastroparesis     History of colonic polyps     last assessed - [id number]    History of gastroesophageal reflux (GERD)     Hypercholesterolemia     Hyperlipidemia     Hypertension     IBS (irritable bowel syndrome)     Post laminectomy syndrome     Seasonal allergies     Spinal stenosis        Past Surgical History:   Procedure Laterality Date    APPENDECTOMY      BACK SURGERY      BREAST CYST ASPIRATION Left     pt unsure what exactly was removed    CHOLECYSTECTOMY      COLONOSCOPY      ESOPHAGOGASTRODUODENOSCOPY N/A 9/28/2016    Procedure: ESOPHAGOGASTRODUODENOSCOPY (EGD); Surgeon: Mikel Shaver MD;  [facility];   Service:     HERNIA REPAIR      HYSTERECTOMY      TTAH-BSO age 25   [facility] LAMINECTOMY      LUMBAR LAMINECTOMY      OOPHORECTOMY      age 25    VT ARTHRODESIS POSTERIOR/POSTEROLATERAL THORACIC N/A 6/4/2018    Procedure: Reopening of lumbar incision for T12-L5 posterior instrumented fixation and fusion and T12-L4 posterior decompression;  Surgeon: Shantanu Santana MD;  [facility];  Service: Neurosurgery    MD COLONOSCOPY FLX DX W/COLLJ SPEC WHEN PFRMD N/A 3/2/2016    Procedure: EGD AND COLONOSCOPY;  Surgeon: Mary North MD;  [facility]: [facility]; Service: Gastroenterology    MD DILATE ESOPHAGUS N/A 9/28/2016    Procedure: DILATATION ESOPHAGEAL;  Surgeon: Mary North MD;  [facility]; Service: Gastroenterology    MD ESOPHAGOGASTRODUODENOSCOPY TRANSORAL DIAGNOSTIC N/A 7/18/2016    Procedure: ESOPHAGOGASTRODUODENOSCOPY (EGD); Surgeon: Mary North MD;  [facility]; Service: Gastroenterology    MD IMPLANT SPINAL NEUROSTIM/ Left 6/4/2018    Procedure: removal of left buttock implantable pulse generator and placement of new  implantable pulse generator;  Surgeon: Shantanu Santana MD;  [facility];  Service: Neurosurgery       Meds/Allergies:  Prior to Admission medications    Medication Sig Start Date End Date Taking?  Authorizing Provider   albuterol (PROVENTIL HFA,VENTOLIN HFA) 90 mcg/act inhaler Inhale 2 puffs every 6 (six) hours as needed for wheezing or shortness of breath 5/11/20   Christa Biares,    ALPRAZolam Ankush Marilou) 0 25 mg tablet Take 1 tablet (0 25 mg total) by mouth 3 (three) times a day as needed for anxiety 1/3/22   NANY Rothman   baclofen 10 mg tablet TAKE ONE TABLET BY MOUTH THREE TIMES DAILY AS NEEDED FOR MUSCLE SPASM 2/28/22   NANY Rothman   dicyclomine (BENTYL) 20 mg tablet Take 1 tablet (20 mg total) by mouth every 6 (six) hours as needed (bowel spasm) 11/23/21   NANY Rothman   estradiol (ESTRACE VAGINAL) 0 1 mg/g vaginal cream Insert 1 g into the vagina 3 (three) times a week 3/28/22   Ashleigh Kaur PA-C   gabapentin (NEURONTIN) 300 mg capsule TAKE ONE CAPSULE BY MOUTH DAILY IN MORNING AND TWO CAPSULES BY MOUTH AT BEDTIME 1/27/22   NANY Rothman   glucose blood test strip Bid testing 2/27/19   Susan Baires DO   latanoprost (XALATAN) 0 005 % ophthalmic solution Administer 1 drop to both eyes daily at bedtime 10/2/20 7/20/21  Susan Baires DO   levothyroxine 25 mcg tablet Take 1 5 tablets (37 5 mcg total) by mouth daily 8/9/21 9/8/21  Susan Baires DO   lisinopril (ZESTRIL) 20 mg tablet Take 1 tablet (20 mg total) by mouth daily 6/2/21 9/30/21  NANY Castillo   meclizine (ANTIVERT) 25 mg tablet Take 1 tablet (25 mg total) by mouth 4 (four) times a day as needed for dizziness 3/23/22 5/2/22  NANY Castillo   metFORMIN (GLUCOPHAGE) 1000 MG tablet Take 1 tablet (1,000 mg total) by mouth 2 (two) times a day with meals 3/9/22   NANY Castillo   metoclopramide (REGLAN) 10 mg tablet Take 1 tablet (10 mg total) by mouth 4 (four) times a day 6/24/21   NANY Castillo   Milnacipran HCl (Savella) 100 MG TABS Take 1 tablet (100 mg total) by mouth daily 9/13/21   Susan Baires DO   nitrofurantoin (MACRODANTIN) 50 mg capsule Take 1 capsule (50 mg total) by mouth daily at bedtime 9/10/21   Susan Baires DO   ondansetron (ZOFRAN) 8 mg tablet Take 1 tablet (8 mg total) by mouth every 8 (eight) hours as needed for nausea or vomiting 3/9/22   NANY Castillo   oxybutynin (DITROPAN) 5 mg tablet Take 1 tablet (5 mg total) by mouth 3 (three) times a day 2/24/22   NANY Castillo   oxyCODONE-acetaminophen (PERCOCET)  mg per tablet Take 1 tablet by mouth every 6 (six) hours as needed for severe pain Max Daily Amount: 4 tablets 4/20/22   NANY Castillo   pantoprazole (PROTONIX) 40 mg tablet Take 1 tablet (40 mg total) by mouth daily 7/22/21 10/20/21  Clovis Savage MD   phenazopyridine (PYRIDIUM) 100 mg tablet Take 1 tablet (100 mg total) by mouth 3 (three) times a day as needed for bladder spasms 11/12/21   NANY Castillo   pravastatin (PRAVACHOL) 10 mg tablet Take 1 tablet (10 mg total) by mouth daily at bedtime 3/4/22   NANY Walker   trospium chloride (SANCTURA) 20 mg tablet Take 1 tablet (20 mg total) by mouth 2 (two) times a day 3/28/22   Ashleigh Kaur PA-C   zolpidem (AMBIEN) 5 mg tablet Take 1 tablet (5 mg total) by mouth daily at bedtime as needed for sleep 22   NANY Walker     I have reviewed home medications with patient family member  Allergies: Allergies   Allergen Reactions    Penicillins Anaphylaxis, Hives and Other (See Comments)     Other reaction(s): Unknown Reaction    Sulfa Antibiotics Anaphylaxis and Other (See Comments)     Other reaction(s): Unknown Reaction    Aspartame - Food Allergy Other (See Comments) and Hypertension     Slurred speech, weakness, stroke sx    Iodinated Diagnostic Agents Hives     Pt has taken prep prior for contrast and has not had any break through reaction    Keflex [Cephalexin] Hives       Social History:  Marital Status:     Occupation:  Unknown  Patient Pre-hospital Living Situation: Home  Patient Pre-hospital Level of Mobility: walks  Patient Pre-hospital Diet Restrictions:  None  Substance Use History:   Social History     Substance and Sexual Activity   Alcohol Use Not Currently    Comment: Denied history of alcohol use     Social History     Tobacco Use   Smoking Status Former Smoker    Types: Cigarettes    Quit date: 1970    Years since quittin 3   Smokeless Tobacco Never Used   Tobacco Comment    Denied history of current ever day smoker, Former smoker and Never smoker all documented in [employer]     Social History     Substance and Sexual Activity   Drug Use No    Comment: Denied history of drug use       Family History:  Family History   Problem Relation Age of Onset    Lung cancer Mother 55    Pulmonary embolism Father     Stroke Maternal Grandmother     Heart attack Maternal Grandfather     No Known Problems Paternal Grandmother     No Known Problems Paternal Grandfather     Diabetes Family         Diabetes mellitus    Hypertension Family     Stroke Family         Stroke complications    No Known Problems Daughter     No Known Problems Daughter     No Known Problems Sister     No Known Problems Maternal Aunt        Physical Exam:     Vitals:   Blood Pressure: 148/68 (04/23/22 0400)  Pulse: 80 (04/23/22 0400)  Temperature: 97 6 °F (36 4 °C) (04/23/22 0100)  Temp Source: Oral (04/23/22 0100)  Respirations: 18 (04/23/22 0400)  Weight - Scale: 90 5 kg (199 lb 8 3 oz) (04/22/22 2005)  SpO2: 97 % (04/23/22 0400)    Physical Exam  Vitals and nursing note reviewed  Constitutional:       General: She is not in acute distress  Appearance: She is well-developed  She is not ill-appearing  HENT:      Head: Normocephalic and atraumatic  Mouth/Throat:      Mouth: Mucous membranes are moist    Eyes:      General: No visual field deficit  Right eye: No discharge  Left eye: No discharge  Pupils: Pupils are equal, round, and reactive to light  Cardiovascular:      Rate and Rhythm: Normal rate and regular rhythm  Heart sounds: No murmur heard  Pulmonary:      Effort: Pulmonary effort is normal  No respiratory distress  Breath sounds: Normal breath sounds  No wheezing, rhonchi or rales  Abdominal:      General: Bowel sounds are normal       Palpations: Abdomen is soft  Tenderness: There is no abdominal tenderness  There is no right CVA tenderness, left CVA tenderness, guarding or rebound  Musculoskeletal:      Cervical back: Normal range of motion and neck supple  Right lower leg: No edema  Left lower leg: No edema  Skin:     General: Skin is warm and dry  Capillary Refill: Capillary refill takes less than 2 seconds  Neurological:      Mental Status: She is alert and oriented to person, place, and time  Cranial Nerves: Cranial nerves are intact  No dysarthria or facial asymmetry        Motor: Weakness (BLE) present  Psychiatric:         Mood and Affect: Mood normal          Behavior: Behavior normal        Additional Data:     Lab Results:  Results from last 7 days   Lab Units 04/22/22 2010   WBC Thousand/uL 5 79   HEMOGLOBIN g/dL 11 1*   HEMATOCRIT % 35 5   PLATELETS Thousands/uL 232     Results from last 7 days   Lab Units 04/22/22 2010   SODIUM mmol/L 139   POTASSIUM mmol/L 4 1   CHLORIDE mmol/L 102   CO2 mmol/L 28   BUN mg/dL 17   CREATININE mg/dL 1 37*   ANION GAP mmol/L 9   CALCIUM mg/dL 8 9   GLUCOSE RANDOM mg/dL 192*     Results from last 7 days   Lab Units 04/22/22 2010   INR  0 92     Results from last 7 days   Lab Units 04/23/22  0204 04/22/22 2001   POC GLUCOSE mg/dl 141* 168*               Imaging: Reviewed radiology reports from this admission including: CT head and CTA head and neck  CTA stroke alert (head/neck)   Final Result by Justin Chauhan MD (04/22 2040)      Moderate to severe nonocclusive right M1 segment stenosis  I personally discussed this study with neurologist on 4/22/2022 at 8:24 PM                         Workstation performed: [id number]         CT stroke alert brain   Final Result by Justin Chauhan MD (04/22 2030)      No evidence of acute intracranial hemorrhage  Right basal ganglia low-attenuation age-indeterminate ischemia, correlate with noncontrast brain MRI  I personally discussed this study with neurologist on 4/22/2022 at 8:24 PM                 Workstation performed: [id number]             EKG and Other Studies Reviewed on Admission:   · EKG: NSR    ** Please Note: This note has been constructed using a voice recognition system   **

## 2022-04-23 NOTE — ASSESSMENT & PLAN NOTE
· Patient presented with expressive aphasia, generalized fatigue No facial droop,arm weakness or lateralization neurologically  Patient was reported to have s/s in the morning presented to ED in the evening  Unsure of last known well time  · PMH: HTN compliant with medication    Workup:   In the ED, /77  · EKG - NSR Rate 100  · CT of the head - No evidence of acute intracranial hemorrhage  Right basal ganglia low-attenuation age-indeterminate ischemia, correlate with noncontrast brain MRI  · CT of the Neck with contrast - Moderate to severe nonocclusive right M1 segment stenosis  Plan - Stroke pathway:  · ASA 81 mg q d  · Continue home statin therapy  · Echo pending  · Patient cannot have MRI due to MRI incompatible back stimulator device  · Monitor on telemetry  · Lipid Panel, HbA1c pending  · PT/OT/Speech eval  · Allow for permissive hypertension with SBP upto 220 in the first 24 hrs then  140-170 for the next 48 hours  · Neuro checks q 4h times 24 hours  · Neurology consult

## 2022-04-23 NOTE — ASSESSMENT & PLAN NOTE
Lab Results   Component Value Date    HGBA1C 7 2 (A) 05/17/2021       Recent Labs     04/22/22 2001 04/23/22  0204 04/23/22  0730   POCGLU 168* 141* 192*       Blood Sugar Average: Last 72 hrs:  (P) 167     Plan  · Hold metformin  · Insulin SS with meals  · POCT glucose checks before meals and at HS  · Follow up Hgb A1C  · Consistent carbohydrate diet

## 2022-04-23 NOTE — PROGRESS NOTES
[facility]  Progress Note - Giles Savage 1945, 76 y o  female MRN: [id number]  Unit/Bed#: S -01 Encounter: [id number]  Primary Care Provider: NANY Tracey   Date and time admitted to hospital: 4/22/2022  7:58 PM    * Stroke-like symptoms  Assessment & Plan  · Patient presented with expressive aphasia, generalized fatigue No facial droop,arm weakness or lateralization neurologically  Patient was reported to have s/s in the morning presented to ED in the evening  Unsure of last known well time  · PMH: HTN compliant with medication    Workup:   In the ED, /77  · EKG - NSR Rate 100  · CT of the head - No evidence of acute intracranial hemorrhage  Right basal ganglia low-attenuation age-indeterminate ischemia, correlate with noncontrast brain MRI  · CT of the Neck with contrast - Moderate to severe nonocclusive right M1 segment stenosis  Plan - Stroke pathway:  · ASA 81 mg q d  · Continue home statin therapy  · Echo pending  · Patient cannot have MRI due to MRI incompatible back stimulator device  · Consider repeat CT in 48 hours, follow up neurology recommendations   · Monitor on telemetry  · Lipid Panel, HbA1c pending  · PT/OT/Speech eval  · Allow for permissive hypertension with SBP upto 220 in the first 24 hrs then  140-170 for the next 48 hours  · Neuro checks q 4h times 24 hours  · Neurology consult  Hypertension  Assessment & Plan  · Hold lisinopril  · Allow permissive hypertension up to  in the 1st 24 hours then  - 170 for the next 48 hours  · Labetalol 10 mg p r n  For SBP > 220      Type 2 diabetes mellitus with hyperglycemia, without long-term current use of insulin [facility])  Assessment & Plan  Lab Results   Component Value Date    HGBA1C 7 2 (A) 05/17/2021       Recent Labs     04/22/22 2001 04/23/22  0204 04/23/22  0730   POCGLU 168* 141* 192*       Blood Sugar Average: Last 72 hrs:  (P) 167     Plan  · Hold metformin  · Insulin SS with meals  · POCT glucose checks before meals and at HS  · Follow up Hgb A1C  · Consistent carbohydrate diet  Overactive bladder  Assessment & Plan  · Continue home Myrbetriq  · Call family to bring non-formulary medication  Hypothyroidism  Assessment & Plan  · Continue home levothyroxine 37 5 mcg  High cholesterol  Assessment & Plan  · Continue pravastatin 10 mg q h s  VTE Pharmacologic Prophylaxis: VTE Score: 4 Moderate Risk (Score 3-4) - Pharmacological DVT Prophylaxis Ordered: enoxaparin (Lovenox)  Patient Centered Rounds: I performed bedside rounds with nursing staff today  Discussions with Specialists or Other Care Team Provider: Neurology    Education and Discussions with Family / Patient: Attempted to update  (daughter) via phone  Unable to contact  Current Length of Stay: 0 day(s)  Current Patient Status: Observation   Discharge Plan: TBD      Code Status: Level 1 - Full Code    Subjective:   No acute events overnight  Patient states she feels a lot better today and is noticing intermittent word-finding and denies feeling weak this morning  She said she does not feel quite normal but admits to feeling a little off  Objective:     Vitals:   Temp (24hrs), Av 8 °F (36 6 °C), Min:97 6 °F (36 4 °C), Max:98 °F (36 7 °C)    Temp:  [97 6 °F (36 4 °C)-98 °F (36 7 °C)] 97 8 °F (36 6 °C)  HR:  [] 89  Resp:  [16-18] 18  BP: (138-192)/(68-95) 167/74  SpO2:  [92 %-99 %] 97 %  Body mass index is 38 97 kg/m²  Input and Output Summary (last 24 hours): Intake/Output Summary (Last 24 hours) at 2022 1120  Last data filed at 2022 0601  Gross per 24 hour   Intake --   Output 400 ml   Net -400 ml       Physical Exam:   Physical Exam  Vitals and nursing note reviewed  Constitutional:       General: She is not in acute distress  Appearance: She is well-developed  HENT:      Head: Normocephalic and atraumatic     Eyes: Conjunctiva/sclera: Conjunctivae normal    Cardiovascular:      Rate and Rhythm: Normal rate and regular rhythm  Heart sounds: No murmur heard  Pulmonary:      Effort: Pulmonary effort is normal  No respiratory distress  Breath sounds: Wheezing (mild diffuse) present  Abdominal:      Palpations: Abdomen is soft  Tenderness: There is no abdominal tenderness  Musculoskeletal:      Cervical back: Neck supple  Skin:     General: Skin is warm and dry  Neurological:      Mental Status: She is alert  Cranial Nerves: Facial asymmetry present  No dysarthria  Sensory: Sensation is intact  Motor: Motor function is intact            Additional Data:     Labs:  Results from last 7 days   Lab Units 04/22/22 2010   WBC Thousand/uL 5 79   HEMOGLOBIN g/dL 11 1*   HEMATOCRIT % 35 5   PLATELETS Thousands/uL 232     Results from last 7 days   Lab Units 04/23/22  0559   SODIUM mmol/L 140   POTASSIUM mmol/L 4 2   CHLORIDE mmol/L 104   CO2 mmol/L 29   BUN mg/dL 19   CREATININE mg/dL 1 16   ANION GAP mmol/L 7   CALCIUM mg/dL 8 7   ALBUMIN g/dL 3 0*   TOTAL BILIRUBIN mg/dL 0 29   ALK PHOS U/L 142*   ALT U/L 12   AST U/L 17   GLUCOSE RANDOM mg/dL 150*     Results from last 7 days   Lab Units 04/22/22 2010   INR  0 92     Results from last 7 days   Lab Units 04/23/22  1051 04/23/22  0730 04/23/22  0204 04/22/22 2001   POC GLUCOSE mg/dl 180* 192* 141* 168*               Lines/Drains:  Invasive Devices  Report    Peripheral Intravenous Line            Peripheral IV 04/22/22 Left Antecubital <1 day    Peripheral IV 04/22/22 Right Antecubital <1 day                  Telemetry:  Telemetry Orders (From admission, onward)             48 Hour Telemetry Monitoring  Continuous x 48 hours        References:    Telemetry Guidelines   Question:  Reason for 48 Hour Telemetry  Answer:  Acute CVA (<24 hrs old, hemispheric strokes, selected brainstem strokes, cardiac arrhythmias)                 Telemetry Reviewed: NSVT  Indication for Continued Telemetry Use: Acute CVA             Imaging: No pertinent imaging reviewed  Recent Cultures (last 7 days):         Last 24 Hours Medication List:   Current Facility-Administered Medications   Medication Dose Route Frequency Provider Last Rate    albuterol  2 puff Inhalation Q6H PRN Leonora Ryan MD      aspirin  81 mg Oral Daily Isidro Thakkar, DO      clopidogrel  75 mg Oral Daily Isidro Pleas, DO      enoxaparin  30 mg Subcutaneous Daily Isidro Pleas, DO      insulin lispro  1-6 Units Subcutaneous TID [facility] Isidro Thakkar, DO      labetalol  10 mg Intravenous Q6H PRN Isidro Thakkar, DO      latanoprost  1 drop Both Eyes HS Leonora Ryan MD      levothyroxine  37 5 mcg Oral Early Morning Leonora Ryan MD      nitrofurantoin  50 mg Oral HS Isidro Thakkar, DO      oxybutynin  5 mg Oral Daily Tommy Gupta MD      oxyCODONE-acetaminophen  1 tablet Oral Q6H PRN Isidro Thakkar, DO      pantoprazole  40 mg Oral Early Morning Leonora Ryan MD      pravastatin  10 mg Oral HS Leonora Ryan MD          Today, Patient Was Seen By: Tommy Gupat MD    **Please Note: This note may have been constructed using a voice recognition system  **

## 2022-04-23 NOTE — PLAN OF CARE
Problem: OCCUPATIONAL THERAPY ADULT  Goal: Performs self-care activities at highest level of function for planned discharge setting  See evaluation for individualized goals  Description: Treatment Interventions: ADL retraining,Functional transfer training,UE strengthening/ROM,Endurance training,Patient/family training,Neuromuscular reeducation,Compensatory technique education,Continued evaluation,Activityengagement,Energy conservation          See flowsheet documentation for full assessment, interventions and recommendations  Note: Limitation: Decreased ADL status,Decreased UE strength,Decreased endurance,Decreased self-care trans,Decreased high-level ADLs  Prognosis: Fair  Assessment: Patient evaluated by Occupational Therapy  Patient admitted with Stroke-like symptoms  Noncontrast head CT which reveals no evidence of intercerebral hemorrhage or large territorial infarction and CT angiogram which reveals no evidence of large vessel occlusion or significant flow limiting stenosis  The patients occupational profile, medical and therapy history includes a expanded review of medical and/or therapy records and additional review of physical, cognitive, or psychosocial history related to current functional performance  Comorbidities affecting functional mobility and ADLS include: anxiety, diabetes, hypertension and hyperlipidemia  Prior to admission, patient was independent with functional mobility with cane, independent with ADLS, independent with IADLS and living with son in a 1 level home with 3  steps to enter  Patient performed grooming and UB bath Sup, UB dressing Sup and lB dressing sup, Bed mobility Min A , transfer Min A and functional ambulation Min A  The evaluation identifies the following performance deficits: weakness, impaired balance, decreased endurance, decreased coordination, increased fall risk, new onset of impairment of functional mobility, decreased ADLS, decreased IADLS and decreased activity tolerance, that result in activity limitations and/or participation restrictions  This evaluation requires clinical decision making of moderate complexity, because the patient may present with comorbidities that affect occupational performance and required minimal or moderate modification of tasks or assistance with the consideration of several treatment options  The Barthel Index was used as a functional outcome tool presenting with a score of Barthel Index Score: 70  The patient's raw score on the AM-PAC Daily Activity inpatient short form is 19, standardized score is 40 22, greater than 39 4  Patients at this level are likely to benefit from DC to home with [facility]  Please refer to the recommendation of the Occupational Therapist for safe DC planning  Patient will benefit from skilled Occupational Therapy services to address above deficits and facilitate a safe return to prior level of function       OT Discharge Recommendation: Home with home health rehabilitation

## 2022-04-23 NOTE — PLAN OF CARE
Problem: Potential for Falls  Goal: Patient will remain free of falls  Description: INTERVENTIONS:  - Educate patient/family on patient safety including physical limitations  - Instruct patient to call for assistance with activity   - Consult OT/PT to assist with strengthening/mobility   - Keep Call bell within reach  - Keep bed low and locked with side rails adjusted as appropriate  - Keep care items and personal belongings within reach  - Initiate and maintain comfort rounds  - Make Fall Risk Sign visible to staff  - Offer Toileting every  Hours, in advance of need  - Initiate/Maintain alarm  - Obtain necessary fall risk management equipment:   - Apply yellow socks and bracelet for high fall risk patients  - Consider moving patient to room near nurses station  Outcome: Progressing     Problem: MOBILITY - ADULT  Goal: Maintain or return to baseline ADL function  Description: INTERVENTIONS:  -  Assess patient's ability to carry out ADLs; assess patient's baseline for ADL function and identify physical deficits which impact ability to perform ADLs (bathing, care of mouth/teeth, toileting, grooming, dressing, etc )  - Assess/evaluate cause of self-care deficits   - Assess range of motion  - Assess patient's mobility; develop plan if impaired  - Assess patient's need for assistive devices and provide as appropriate  - Encourage maximum independence but intervene and supervise when necessary  - Involve family in performance of ADLs  - Assess for home care needs following discharge   - Consider OT consult to assist with ADL evaluation and planning for discharge  - Provide patient education as appropriate  Outcome: Progressing  Goal: Maintains/Returns to pre admission functional level  Description: INTERVENTIONS:  - Perform BMAT or MOVE assessment daily    - Set and communicate daily mobility goal to care team and patient/family/caregiver     - Collaborate with rehabilitation services on mobility goals if consulted  - Perform Range of Motion  times a day  - Reposition patient every hours    - Dangle patient  times a day  - Stand patient  times a day  - Ambulate patient  times a day  - Out of bed to chair  times a day   - Out of bed for meals  times a day  - Out of bed for toileting  - Record patient progress and toleration of activity level   Outcome: Progressing

## 2022-04-23 NOTE — QUICK NOTE
Cedrick Varela          Assessment/Plan   Assessment:  Kristine Merino is a 66-year-old woman who presents with symptoms concerning for a left brain stroke although the suggestion on head CT is there may be an age-indeterminate right basal ganglia stroke  TPA Decision: Patient not a TPA candidate  Unclear time of onset outside appropriate time window  Plan: -admit along stroke pathway   - initiate appropriate vital signs and neuro checks   - dysphagia screen prior to any oral intake  - permissive hypertension to maximum blood pressure 220/110  - would give aspirin 325 mg x 1 followed by 81 mg daily  - high-dose statin   - check fasting lipid panel and hemoglobin A1c if they have not been done in the last  3 months   - MRI brain  - PT/OT/ SP   - 2D echocardiogram      History of Present Illness     Reason for Consult / Principal Problem: stroke alert  Hx and PE limited by: none  Patient last known well: early afternoon 4/22/2022  Stroke alert called: 4/22/2022 2008  Neurology time of arrival: 4/22/2022 2011 by phone    HPI: Leopold Kirsten is a 76 y o  female who presents as a stroke alert  She has minimal vascular risk factors  She presents with altered speech/aphasia and some right-sided weakness  See emergency room note for examination  Stroke scale as per nursing evaluation was 1    I personally reviewed the noncontrast head CT which reveals no evidence of intercerebral hemorrhage or large territorial infarction  I also reviewed the CT angiogram which reveals no evidence of large vessel occlusion or significant flow limiting stenosis  Radiology does suggest that there is a new right basal ganglia hypodensity of indeterminate age        Past Medical History:   Diagnosis Date    Acid reflux     Anemia     hx of iron-deficient    Anxiety     Arthritis     Asthma     last needed inhaler last year 2020    Basal cell carcinoma upper lip    Chronic narcotic dependence (HCC)     Chronic pain     Colon polyp     Cystocele     Diabetes mellitus ([facility] [address] )     stable    Disease of thyroid gland     hypothyroidism    Diverticulosis     Dysfunctional uterine bleeding     last assessed - [id number]    Fibromyalgia     Gastric ulcer     Gastroparesis     History of colonic polyps     last assessed - [id number]    History of gastroesophageal reflux (GERD)     Hypercholesterolemia     Hyperlipidemia     Hypertension     IBS (irritable bowel syndrome)     Post laminectomy syndrome     Seasonal allergies     Spinal stenosis        Social History     Socioeconomic History    Marital status:      Spouse name: Not on file    Number of children: Not on file    Years of education: Not on file    Highest education level: Not on file   Occupational History    Occupation: Retired   Tobacco Use    Smoking status: Former Smoker     Types: Cigarettes     Quit date: 1970     Years since quittin 3    Smokeless tobacco: Never Used    Tobacco comment: Denied history of current ever day smoker, Former smoker and Never smoker all documented in [employer]   Vaping Use    Vaping Use: Never used   Substance and Sexual Activity    Alcohol use: Not Currently     Comment: Denied history of alcohol use    Drug use: No     Comment: Denied history of drug use    Sexual activity: Not Currently   Other Topics Concern    Not on file   Social History Narrative    Marital History - Currently  per [employer]     Social Determinants of Health     Financial Resource Strain: Not on file   Food Insecurity: Not on file   Transportation Needs: Not on file   Physical Activity: Not on file   Stress: Not on file   Social Connections: Not on file   Intimate Partner Violence: Not on file   Housing Stability: Not on file       Meds/Allergies   PTA meds:   Prior to Admission Medications   Prescriptions Last Dose Informant Patient Reported? Taking? ALPRAZolam (XANAX) 0 25 mg tablet  Self No No   Sig: Take 1 tablet (0 25 mg total) by mouth 3 (three) times a day as needed for anxiety   Milnacipran HCl (Savella) 100 MG TABS  Self No No   Sig: Take 1 tablet (100 mg total) by mouth daily   albuterol (PROVENTIL HFA,VENTOLIN HFA) 90 mcg/act inhaler  Self No No   Sig: Inhale 2 puffs every 6 (six) hours as needed for wheezing or shortness of breath   baclofen 10 mg tablet  Self No No   Sig: TAKE ONE TABLET BY MOUTH THREE TIMES DAILY AS NEEDED FOR MUSCLE SPASM   dicyclomine (BENTYL) 20 mg tablet  Self No No   Sig: Take 1 tablet (20 mg total) by mouth every 6 (six) hours as needed (bowel spasm)   estradiol (ESTRACE VAGINAL) 0 1 mg/g vaginal cream   No No   Sig: Insert 1 g into the vagina 3 (three) times a week   gabapentin (NEURONTIN) 300 mg capsule  Self No No   Sig: TAKE ONE CAPSULE BY MOUTH DAILY IN MORNING AND TWO CAPSULES BY MOUTH AT BEDTIME   glucose blood test strip  Self No No   Sig: Bid testing   latanoprost (XALATAN) 0 005 % ophthalmic solution  Child No No   Sig: Administer 1 drop to both eyes daily at bedtime   levothyroxine 25 mcg tablet   No No   Sig: Take 1 5 tablets (37 5 mcg total) by mouth daily   lisinopril (ZESTRIL) 20 mg tablet  Self No No   Sig: Take 1 tablet (20 mg total) by mouth daily   meclizine (ANTIVERT) 25 mg tablet  Self No No   Sig: Take 1 tablet (25 mg total) by mouth 4 (four) times a day as needed for dizziness   metFORMIN (GLUCOPHAGE) 1000 MG tablet  Self No No   Sig: Take 1 tablet (1,000 mg total) by mouth 2 (two) times a day with meals   metoclopramide (REGLAN) 10 mg tablet  Self No No   Sig: Take 1 tablet (10 mg total) by mouth 4 (four) times a day   nitrofurantoin (MACRODANTIN) 50 mg capsule  Self No No   Sig: Take 1 capsule (50 mg total) by mouth daily at bedtime   ondansetron (ZOFRAN) 8 mg tablet  Self No No   Sig: Take 1 tablet (8 mg total) by mouth every 8 (eight) hours as needed for nausea or vomiting   oxyCODONE-acetaminophen (PERCOCET)  mg per tablet   No No   Sig: Take 1 tablet by mouth every 6 (six) hours as needed for severe pain Max Daily Amount: 4 tablets   oxybutynin (DITROPAN) 5 mg tablet  Self No No   Sig: Take 1 tablet (5 mg total) by mouth 3 (three) times a day   pantoprazole (PROTONIX) 40 mg tablet   No No   Sig: Take 1 tablet (40 mg total) by mouth daily   phenazopyridine (PYRIDIUM) 100 mg tablet  Self No No   Sig: Take 1 tablet (100 mg total) by mouth 3 (three) times a day as needed for bladder spasms   pravastatin (PRAVACHOL) 10 mg tablet  Self No No   Sig: Take 1 tablet (10 mg total) by mouth daily at bedtime   trospium chloride (SANCTURA) 20 mg tablet   No No   Sig: Take 1 tablet (20 mg total) by mouth 2 (two) times a day   zolpidem (AMBIEN) 5 mg tablet   No No   Sig: Take 1 tablet (5 mg total) by mouth daily at bedtime as needed for sleep      Facility-Administered Medications: None         Patient Vitals for the past 24 hrs:   BP Temp Temp src Pulse Resp SpO2 Weight   04/23/22 0200 144/72 -- -- 83 18 98 % --   04/23/22 0100 150/80 97 6 °F (36 4 °C) Oral 90 16 96 % --   04/22/22 2330 (!) 192/76 -- -- 92 16 96 % --   04/22/22 2200 (!) 191/91 -- -- -- -- -- --   04/22/22 2155 -- -- -- 94 -- -- --   04/22/22 2150 -- -- -- 96 -- -- --   04/22/22 2145 (!) 177/95 -- -- 98 16 97 % --   04/22/22 2140 -- -- -- 102 -- -- --   04/22/22 2135 -- -- -- 102 -- -- --   04/22/22 2130 (!) 177/95 -- -- -- -- -- --   04/22/22 2130 -- -- -- 104 -- -- --   04/22/22 2125 -- -- -- 94 -- 97 % --   04/22/22 2120 -- -- -- 96 -- 98 % --   04/22/22 2115 155/75 -- -- 96 16 97 % --   04/22/22 2115 -- -- -- 96 -- 97 % --   04/22/22 2110 -- -- -- 98 -- 97 % --   04/22/22 2105 -- -- -- 98 -- 97 % --   04/22/22 2100 164/73 -- -- 98 16 97 % --   04/22/22 2100 -- -- -- 98 -- 97 % --   04/22/22 2055 -- -- -- 100 -- 96 % --   04/22/22 2050 -- -- -- 98 -- 99 % --   04/22/22 2045 138/69 -- -- 98 16 98 % --   04/22/22 2045 -- -- -- 98 -- 98 % -- 04/22/22 2040 -- -- -- 98 -- 99 % --   04/22/22 2035 -- -- -- 100 -- 93 % --   04/22/22 2030 154/69 -- -- -- -- -- --   04/22/22 2030 154/69 -- -- 99 16 93 % --   04/22/22 2020 (!) 172/72 -- -- 100 16 95 % --   04/22/22 2010 -- -- -- 100 -- 92 % --   04/22/22 2005 (!) 178/77 98 °F (36 7 °C) Oral 87 18 98 % 90 5 kg (199 lb 8 3 oz)

## 2022-04-23 NOTE — PROGRESS NOTES
PHYSICAL THERAPY EVALUATION NOTE    NAME: Maximilian Tariq  : 1945    AGE:   76 y o   Mrn:   [id number]  ADMIT DX:  Stroke ([facility] [address] ) [I63 9]  Stroke-like symptoms [R29 90]  Symptoms of cerebrovascular accident (CVA) [R09 89]    Past Medical History:   Diagnosis Date    Acid reflux     Anemia     hx of iron-deficient    Anxiety     Arthritis     Asthma     last needed inhaler last 2020    Basal cell carcinoma     upper lip    Chronic narcotic dependence (HCC)     Chronic pain     Colon polyp     Cystocele     Diabetes mellitus ([facility] [address] )     stable    Disease of thyroid gland     hypothyroidism    Diverticulosis     Dysfunctional uterine bleeding     last assessed - [id number]    Fibromyalgia     Gastric ulcer     Gastroparesis     History of colonic polyps     last assessed - [id number]    History of gastroesophageal reflux (GERD)     Hypercholesterolemia     Hyperlipidemia     Hypertension     IBS (irritable bowel syndrome)     Post laminectomy syndrome     Seasonal allergies     Spinal stenosis      Length Of Stay: 0  PHYSICAL THERAPY NOTE:   Patient's identity confirmed via 2 patient identifiers (full name and ) at start of session     22 1403   PT Last Visit   PT Visit Date 22   Note Type   Note type Evaluation   Pain Assessment   Pain Assessment Tool 0-10   Pain Score 5   Pain Location/Orientation Location: Back   Effect of Pain on Daily Activities pain limited functional mobility    Patient's Stated Pain Goal No pain   Hospital Pain Intervention(s) Repositioned; Ambulation/increased activity; Emotional support   Multiple Pain Sites No  (Pt denies R sided weakness + numbness)   Restrictions/Precautions   Weight Bearing Precautions Per Order No   Other Precautions Cognitive; Chair Alarm; Bed Alarm; Fall Risk;Pain   Home Living   Type of Home Apartment  (3 [address])   Home Layout One level;Performs ADLs on one level; Able to live on main level with bedroom/bathroom;Stairs to enter with rails   Bathroom Shower/Tub Tub only   Bathroom Toilet Standard   Bathroom Accessibility Accessible   Home Equipment Cane;Walker  (pt reports using cane and walker for community ambulation)   Additional Comments Pt reports using cane/walker for community ambulation  Pt's family reports that pt furniture surfs in home and doesn't use AD in house  Prior Function   Level of [place] Independent with ADLs and functional mobility   Lives With Son   Receives Help From Family  (daughter and grandaughter present for eval)   ADL Assistance Independent   IADLs Independent   Falls in the last 6 months 5 to 10  (pt reports 2 falls, family members report 6 falls)   Vocational Retired   Comments Pt is a poor historian and referrde to family members for social history confirmation  Pt reports 2 falls, while family reports ~6 falls  Family states that pt doesn't tell family when she falls  General   Additional Pertinent History Pt reports dizziness at baseline in AM for 20 min until she resumes in reclliner w/ heating pad  Pt reports heating pad intermittently helps with back pain   Family/Caregiver Present Yes  (daughter+ granddaughter)   Cognition   Overall Cognitive Status Impaired   Arousal/Participation Alert   Orientation Level Oriented to person;Oriented to place   Memory Decreased short term memory;Decreased recall of recent events   Following Commands Follows one step commands with increased time or repetition   Comments Pt pleasant and agreeable to participate in PT evaluation   Pt is slow to answer questions throughout evaluation and refers to family for reassurance for social hx/ home set-up   Subjective   Subjective "It's hard to use the walker in the house "   RUE Assessment   RUE Assessment WFL   LUE Assessment   LUE Assessment WFL   RLE Assessment   RLE Assessment X   Strength RLE   RLE Overall Strength 4-/5  (at least 3+/5 functionally observed)   LLE Assessment   LLE Assessment X   Strength LLE   LLE Overall Strength 4-/5  (at least 3+  5 functionally observed)   Bed Mobility   Supine to Sit Unable to assess   Sit to Supine Unable to assess   Additional Comments Pt positioned in bedside recliner at beginning and end of Evaluation, w/ chair alarm activated, call bell within reach, and all needs met   Transfers   Sit to Stand 5  Supervision   Additional items Assist x 1; Increased time required;Verbal cues  (VC for hand placement)   Stand to Sit 5  Supervision   Additional items Assist x 1; Increased time required;Verbal cues   Additional Comments Pt able to perform STS x 1 from EOB w/ supervision and VC for hand placement  Pt experiences slight dizziness upon standing that subsided after a few seconds   Ambulation/Elevation   Gait pattern Wide RUBIN; Foward flexed; Short stride; Inconsistent jorge; Step to;Excessively slow   Gait Assistance 4  Minimal assist   Additional items Assist x 1;Verbal cues   Assistive Device [facility]   Distance 6 ft   Stair Management Assistance Not tested   Ambulation/Elevation Additional Comments Pt able to ambulate 6ft w/ min A  x1 and SPC  Pt reports that she did not feel steady w/ use of SPC,   Balance   Static Sitting Good   Static Standing Fair -   Ambulatory Poor +   Endurance Deficit   Endurance Deficit Yes   Endurance Deficit Description endurance limited by pain, fatigue, and dizziness   Activity Tolerance   Activity Tolerance Patient limited by fatigue;Patient limited by pain   Nurse Made Aware Per RN Yue Glaser, pt appropriate to see for PT evaluation   Assessment   Prognosis Fair   Problem List Decreased strength;Decreased endurance; Impaired balance;Decreased mobility;Pain;Obesity   Assessment Elin Lincoln is a 76 y o  Female who [facility] from home w/ c/o stroke-like s/s:  slurred speech, slight global aphasia, R sided weakness, R facial numbness, and bilateral LE weakness   Orders for PT eval and treat received, w/ activity orders of up and out of bed as tolerated  Pt presents w/ comorbidities of T2DM, HTN, overactive bladder, hypothyroid, high cholesterol, basal cell carcinoma, anemia, diverticulosis, GERD, and lumbar fusion of T-12-L5    At baseline, pt mobilizes independently w/ intermittent use of SPC and walker, and reports 2 falls in the last 6 months  Upon eval, pt family reports 6 falls in the last 6 months  Upon evaluation, pt presents w/ the following deficits: decreased insight to fall hx, weakness, decreased ROM, impaired balance, decreased endurance and pain limiting functional mobility  Upon eval,, supervision for transfers, and min A x 1 w/ SPC for gait  Unable to assess bed mobility tasks due to pt positioned OOB at beginning of eval  Pt's clinical presentation is unstable/unpredictable due to need for assist w/ all phases of mobility when usually mobilizing independently, pain impacting overall mobility status, need for input for mobility technique and recent history of falls  Patient is at an increased risk of falls due to balance and strength deficits  Given the above findings, discharge recommendation is for Home w/ HHPT  During this admission, pt would benefit from continued skilled inpatient PT in the acute care setting in order to address the abovementioned deficits to maximize function and mobility before DC from acute care  Goals   Patient Goals To go home   STG Expiration Date 05/03/22   Short Term Goal #1 Pt will: Increase bilateral LE strength 1/2 grade to facilitate independent mobility, perform all bed mobility tasks independently to decrease fall risk factors, perform all transfers independently to improve independence, ambulate 200 ft  with RW independently w/o LOB to expedite safe return home, perform 3 STEs w/ min A x 1 and unilateral railing for UE support w/o LOB in order to expedite return to previous living environment, improve balance 1/2 grade in order to reduce fall risk factors, complete exercise program independently  PT Treatment Day 1   Plan   Treatment/Interventions LE strengthening/ROM; Elevations; Therapeutic exercise; Endurance training;Cognitive reorientation;Patient/family training;Equipment eval/education; Bed mobility;Gait training;Spoke to nursing   PT Frequency 2-3x/wk   Recommendation   PT Discharge Recommendation Home with home health rehabilitation   [address] [place] [address] Mobility Inpatient   Turning in Bed Without Bedrails 3   Lying on Back to Sitting on Edge of Flat Bed 3   Moving Bed to Chair 3   Standing Up From Chair 3   Walk in Room 3   Climb 3-5 Stairs 2   Basic Mobility Inpatient Raw Score 17   Basic Mobility Standardized Score 39 67   Highest Level Of Mobility   -[facility] Goal 5: Stand one or more mins   -[facility] Highest Level of Mobility 6: Walk 10 steps or more   -[facility] Goal Achieved Yes   Additional Treatment Session   Start Time 1422   End Time 1432   Treatment Assessment Pt is pleasant and agreeable to participate in PT treatment session  Pt positioned in bedside recliner at beginning of session  Pt was able to perform STS from recliner w/ supervision  Upon standing, pt was able to perform proper weight shifting, marching in place and alternating forward toe-taps w/ bilateral UE of RW and supervision x 1  Pt was also able to ambulate  w/ 16 ft then 10 ft (break in between) w/ RW and supervision  Pt displayed increased safety awareness w/ use of RW as AD  Pt was educated on proper RW sequencing and was able to demonstrated understanding  Pt continues to benefit from skilled IP PT while in acute care setting to address the above deficits  D/C recommendations continue to be home w/ HHPT in order to improve pt functional mobility and independence  At end of PT treatment session, pt positioned OOB in bedside recliner, w/ chair alarm activated, call bell within reach, all needs met, and daughter+granddaughter present      Equipment Use RW   End of Consult   Patient Position at End of Consult Bedside chair;Bed/Chair alarm activated; All needs within reach       The patient's AM-PAC Basic Mobility Inpatient Short Form Raw Score is 17, Standardized Score is 39 67  A standardized score less than 38 32 (raw score of 16) suggests the patient may benefit from discharge to post-acute rehabilitation services which may not coincide with above PT recommendations  However please refer to therapist recommendation for discharge planning given other factors that may influence destination  Pt would benefit from skilled inpatient PT during this admission in order to facilitate progress towards goals to maximize functional independence    Ana Cristina Thompson, SPT

## 2022-04-24 ENCOUNTER — APPOINTMENT (INPATIENT)
Dept: CT IMAGING | Facility: HOSPITAL | Age: 77
DRG: 093 | End: 2022-04-24
Payer: COMMERCIAL

## 2022-04-24 ENCOUNTER — APPOINTMENT (INPATIENT)
Dept: NON INVASIVE DIAGNOSTICS | Facility: HOSPITAL | Age: 77
DRG: 093 | End: 2022-04-24
Payer: COMMERCIAL

## 2022-04-24 LAB
ANION GAP SERPL CALCULATED.3IONS-SCNC: 8 MMOL/L (ref 4–13)
BUN SERPL-MCNC: 20 MG/DL (ref 5–25)
CALCIUM SERPL-MCNC: 8.9 MG/DL (ref 8.3–10.1)
CHLORIDE SERPL-SCNC: 104 MMOL/L (ref 100–108)
CO2 SERPL-SCNC: 28 MMOL/L (ref 21–32)
CREAT SERPL-MCNC: 1.12 MG/DL (ref 0.6–1.3)
ERYTHROCYTE [DISTWIDTH] IN BLOOD BY AUTOMATED COUNT: 14.1 % (ref 11.6–15.1)
GFR SERPL CREATININE-BSD FRML MDRD: 46 ML/MIN/1.73SQ M
GLUCOSE SERPL-MCNC: 183 MG/DL (ref 65–140)
GLUCOSE SERPL-MCNC: 208 MG/DL (ref 65–140)
GLUCOSE SERPL-MCNC: 235 MG/DL (ref 65–140)
GLUCOSE SERPL-MCNC: 241 MG/DL (ref 65–140)
GLUCOSE SERPL-MCNC: 242 MG/DL (ref 65–140)
HCT VFR BLD AUTO: 34.6 % (ref 34.8–46.1)
HGB BLD-MCNC: 11 G/DL (ref 11.5–15.4)
MCH RBC QN AUTO: 27 PG (ref 26.8–34.3)
MCHC RBC AUTO-ENTMCNC: 31.8 G/DL (ref 31.4–37.4)
MCV RBC AUTO: 85 FL (ref 82–98)
PLATELET # BLD AUTO: 226 THOUSANDS/UL (ref 149–390)
PMV BLD AUTO: 11.3 FL (ref 8.9–12.7)
POTASSIUM SERPL-SCNC: 4.3 MMOL/L (ref 3.5–5.3)
RBC # BLD AUTO: 4.08 MILLION/UL (ref 3.81–5.12)
SODIUM SERPL-SCNC: 140 MMOL/L (ref 136–145)
WBC # BLD AUTO: 6.38 THOUSAND/UL (ref 4.31–10.16)

## 2022-04-24 PROCEDURE — 85027 COMPLETE CBC AUTOMATED: CPT

## 2022-04-24 PROCEDURE — 99232 SBSQ HOSP IP/OBS MODERATE 35: CPT | Performed by: INTERNAL MEDICINE

## 2022-04-24 PROCEDURE — 82948 REAGENT STRIP/BLOOD GLUCOSE: CPT

## 2022-04-24 PROCEDURE — 93306 TTE W/DOPPLER COMPLETE: CPT

## 2022-04-24 PROCEDURE — 99232 SBSQ HOSP IP/OBS MODERATE 35: CPT | Performed by: PSYCHIATRY & NEUROLOGY

## 2022-04-24 PROCEDURE — 70450 CT HEAD/BRAIN W/O DYE: CPT

## 2022-04-24 PROCEDURE — G1004 CDSM NDSC: HCPCS

## 2022-04-24 PROCEDURE — 80048 BASIC METABOLIC PNL TOTAL CA: CPT

## 2022-04-24 RX ORDER — ZOLPIDEM TARTRATE 5 MG/1
5 TABLET ORAL ONCE AS NEEDED
Status: COMPLETED | OUTPATIENT
Start: 2022-04-24 | End: 2022-04-24

## 2022-04-24 RX ORDER — LISINOPRIL 20 MG/1
20 TABLET ORAL DAILY
Status: DISCONTINUED | OUTPATIENT
Start: 2022-04-24 | End: 2022-04-25 | Stop reason: HOSPADM

## 2022-04-24 RX ORDER — LABETALOL HYDROCHLORIDE 5 MG/ML
10 INJECTION, SOLUTION INTRAVENOUS EVERY 6 HOURS PRN
Status: DISCONTINUED | OUTPATIENT
Start: 2022-04-24 | End: 2022-04-25 | Stop reason: HOSPADM

## 2022-04-24 RX ADMIN — CLOPIDOGREL BISULFATE 75 MG: 75 TABLET ORAL at 08:08

## 2022-04-24 RX ADMIN — OXYCODONE HYDROCHLORIDE AND ACETAMINOPHEN 1 TABLET: 5; 325 TABLET ORAL at 20:34

## 2022-04-24 RX ADMIN — ZOLPIDEM TARTRATE 5 MG: 5 TABLET ORAL at 21:01

## 2022-04-24 RX ADMIN — PANTOPRAZOLE SODIUM 40 MG: 40 TABLET, DELAYED RELEASE ORAL at 05:58

## 2022-04-24 RX ADMIN — ENOXAPARIN SODIUM 30 MG: 30 INJECTION SUBCUTANEOUS at 08:08

## 2022-04-24 RX ADMIN — LISINOPRIL 20 MG: 20 TABLET ORAL at 10:17

## 2022-04-24 RX ADMIN — LEVOTHYROXINE SODIUM 37.5 MCG: 75 TABLET ORAL at 05:58

## 2022-04-24 RX ADMIN — GUAIFENESIN 1200 MG: 600 TABLET ORAL at 20:33

## 2022-04-24 RX ADMIN — OXYBUTYNIN CHLORIDE 5 MG: 5 TABLET, EXTENDED RELEASE ORAL at 08:08

## 2022-04-24 RX ADMIN — ASPIRIN 81 MG CHEWABLE TABLET 81 MG: 81 TABLET CHEWABLE at 08:08

## 2022-04-24 RX ADMIN — INSULIN LISPRO 3 UNITS: 100 INJECTION, SOLUTION INTRAVENOUS; SUBCUTANEOUS at 08:10

## 2022-04-24 RX ADMIN — LATANOPROST 1 DROP: 50 SOLUTION OPHTHALMIC at 21:01

## 2022-04-24 RX ADMIN — OXYCODONE HYDROCHLORIDE AND ACETAMINOPHEN 1 TABLET: 5; 325 TABLET ORAL at 12:28

## 2022-04-24 RX ADMIN — Medication 12.5 MG: at 20:34

## 2022-04-24 RX ADMIN — NITROFURANTOIN MACROCRYSTALS 50 MG: 50 CAPSULE ORAL at 20:34

## 2022-04-24 RX ADMIN — INSULIN LISPRO 2 UNITS: 100 INJECTION, SOLUTION INTRAVENOUS; SUBCUTANEOUS at 17:36

## 2022-04-24 RX ADMIN — Medication 12.5 MG: at 10:17

## 2022-04-24 RX ADMIN — LABETALOL HYDROCHLORIDE 10 MG: 5 INJECTION, SOLUTION INTRAVENOUS at 05:58

## 2022-04-24 RX ADMIN — INSULIN LISPRO 3 UNITS: 100 INJECTION, SOLUTION INTRAVENOUS; SUBCUTANEOUS at 12:23

## 2022-04-24 RX ADMIN — PRAVASTATIN SODIUM 10 MG: 10 TABLET ORAL at 20:34

## 2022-04-24 NOTE — PLAN OF CARE
Problem: Potential for Falls  Goal: Patient will remain free of falls  Description: INTERVENTIONS:  - Educate patient/family on patient safety including physical limitations  - Instruct patient to call for assistance with activity   - Consult OT/PT to assist with strengthening/mobility   - Keep Call bell within reach  - Keep bed low and locked with side rails adjusted as appropriate  - Keep care items and personal belongings within reach  - Initiate and maintain comfort rounds  - Make Fall Risk Sign visible to staff  - Offer Toileting every  Hours, in advance of need  - Initiate/Maintain alarm  - Obtain necessary fall risk management equipment:   - Apply yellow socks and bracelet for high fall risk patients  - Consider moving patient to room near nurses station  Outcome: Progressing     Problem: MOBILITY - ADULT  Goal: Maintain or return to baseline ADL function  Description: INTERVENTIONS:  -  Assess patient's ability to carry out ADLs; assess patient's baseline for ADL function and identify physical deficits which impact ability to perform ADLs (bathing, care of mouth/teeth, toileting, grooming, dressing, etc )  - Assess/evaluate cause of self-care deficits   - Assess range of motion  - Assess patient's mobility; develop plan if impaired  - Assess patient's need for assistive devices and provide as appropriate  - Encourage maximum independence but intervene and supervise when necessary  - Involve family in performance of ADLs  - Assess for home care needs following discharge   - Consider OT consult to assist with ADL evaluation and planning for discharge  - Provide patient education as appropriate  Outcome: Progressing  Goal: Maintains/Returns to pre admission functional level  Description: INTERVENTIONS:  - Perform BMAT or MOVE assessment daily    - Set and communicate daily mobility goal to care team and patient/family/caregiver     - Collaborate with rehabilitation services on mobility goals if consulted  - Perform Range of Motion  times a day  - Reposition patient every  hours  - Dangle patient  times a day  - Stand patient  times a day  - Ambulate patient  times a day  - Out of bed to chair  times a day   - Out of bed for meals times a day  - Out of bed for toileting  - Record patient progress and toleration of activity level   Outcome: Progressing     Problem: Nutrition/Hydration-ADULT  Goal: Nutrient/Hydration intake appropriate for improving, restoring or maintaining nutritional needs  Description: Monitor and assess patient's nutrition/hydration status for malnutrition  Collaborate with interdisciplinary team and initiate plan and interventions as ordered  Monitor patient's weight and dietary intake as ordered or per policy  Utilize nutrition screening tool and intervene as necessary  Determine patient's food preferences and provide high-protein, high-caloric foods as appropriate       INTERVENTIONS:  - Monitor oral intake, urinary output, labs, and treatment plans  - Assess nutrition and hydration status and recommend course of action  - Evaluate amount of meals eaten  - Assist patient with eating if necessary   - Allow adequate time for meals  - Recommend/ encourage appropriate diets, oral nutritional supplements, and vitamin/mineral supplements  - Order, calculate, and assess calorie counts as needed  - Recommend, monitor, and adjust tube feedings and TPN/PPN based on assessed needs  - Assess need for intravenous fluids  - Provide specific nutrition/hydration education as appropriate  - Include patient/family/caregiver in decisions related to nutrition  Outcome: Progressing

## 2022-04-24 NOTE — ASSESSMENT & PLAN NOTE
· Patient presented with expressive aphasia, generalized fatigue No facial droop,arm weakness or lateralization neurologically  Patient was reported to have s/s in the morning presented to ED in the evening  Unsure of last known well time  · PMH: HTN compliant with medication  Workup:   In the ED, /77   EKG - NSR Rate 100   CT of the head - No evidence of acute intracranial hemorrhage  Right basal ganglia low-attenuation age-indeterminate ischemia, correlate with noncontrast brain MRI   CT of the Neck with contrast - Moderate to severe nonocclusive right M1 segment stenosis   Lipid 92, A1C 8 6    Plan - Stroke pathway:  · ASA 81 mg q d  · Continue home statin therapy  · Echo pending  · Patient cannot have MRI due to MRI incompatible back stimulator device  · Repeat CT 4/24  · Follow up neurology recommendations   · Monitor on telemetry  · Goal -170  · Neuro checks q 4h times 24 hours  · PT/OT: HHC; Speech: can bing outpatient or Radha Fink if concerns

## 2022-04-24 NOTE — PROGRESS NOTES
[facility]  Progress Note - Lorraine Chicas 1945, 76 y o  female MRN: [id number]  Unit/Bed#: S -01 Encounter: [id number]  Primary Care Provider: NANY Russell   Date and time admitted to hospital: 4/22/2022  7:58 PM    * Stroke-like symptoms  Assessment & Plan  · Patient presented with expressive aphasia, generalized fatigue No facial droop,arm weakness or lateralization neurologically  Patient was reported to have s/s in the morning presented to ED in the evening  Unsure of last known well time  · PMH: HTN compliant with medication  Workup:   In the ED, /77   EKG - NSR Rate 100   Tele: normal sinus rhythm rate of 90s   CT of the head - No evidence of acute intracranial hemorrhage  Right basal ganglia low-attenuation age-indeterminate ischemia, correlate with noncontrast brain MRI   CT of the Neck with contrast - Moderate to severe nonocclusive right M1 segment stenosis   Lipid 92, A1C 8 6    Patient cannot have MRI due to MRI incompatible back stimulator device  Plan - Stroke pathway:  · ASA 81 mg q d  · Continue home statin therapy  · Echo pending  · Repeat CT 4/24  · Follow up neurology recommendations   · Off telemetry  · Goal normotension  · Neuro checks q 4h times 24 hours  · PT/OT: HHC; Speech: can eval outpatient or HHC if concerns  Type 2 diabetes mellitus with hyperglycemia, without long-term current use of insulin [facility])  Assessment & Plan  Lab Results   Component Value Date    HGBA1C 8 6 (H) 04/22/2022       Recent Labs     04/23/22  1051 04/23/22  1715 04/23/22  2142 04/24/22  0810   POCGLU 180* 170* 157* 235*       Blood Sugar Average: Last 72 hrs:  (P) 177 [id number]     Plan  · Hold metformin  · Insulin SS with meals  · POCT glucose checks before meals and at HS  · Consistent carbohydrate diet       Hypertension  Assessment & Plan  · Resume  Home Lisinopril 20mg daily  · Initiate Metoprolol tartrate 12 5mg BID  · Adjust as appropriate  · Goal: normotension  · Labetalol 10 mg p r n  For SBP >180  Overactive bladder  Assessment & Plan  · Continue home Myrbetriq  · Call family to bring non-formulary medication  Hypothyroidism  Assessment & Plan  · Continue home levothyroxine 37 5 mcg  High cholesterol  Assessment & Plan  · Continue pravastatin 10 mg q h s  VTE Pharmacologic Prophylaxis: VTE Score: 4 Moderate Risk (Score 3-4) - Pharmacological DVT Prophylaxis Ordered: enoxaparin (Lovenox)  Patient Centered Rounds: I performed bedside rounds with nursing staff today  Discussions with Specialists or Other Care Team Provider: Neurology    Education and Discussions with Family / Patient: Will contract Daughter via phone  Current Length of Stay: 1 day(s)  Current Patient Status: Inpatient   Discharge Plan: Anticipate discharge in 24-48 hrs to home with home services  Code Status: Level 1 - Full Code    Subjective:   Patient is doing well today  No acute overnight events  No new symptoms  Denies headache, shortness of breath, chest pain, nausea/vomiting, fevers and chills  Objective:     Vitals:   Temp (24hrs), Av 3 °F (36 8 °C), Min:98 2 °F (36 8 °C), Max:98 6 °F (37 °C)    Temp:  [98 2 °F (36 8 °C)-98 6 °F (37 °C)] 98 6 °F (37 °C)  HR:  [86-96] 86  Resp:  [18] 18  BP: (153-224)/() 153/73  SpO2:  [95 %-99 %] 97 %  Body mass index is 38 97 kg/m²  Input and Output Summary (last 24 hours): Intake/Output Summary (Last 24 hours) at 2022 1008  Last data filed at 2022 0601  Gross per 24 hour   Intake --   Output 650 ml   Net -650 ml       Physical Exam:   Physical Exam  Vitals and nursing note reviewed  Constitutional:       General: She is not in acute distress  Appearance: She is well-developed  HENT:      Head: Normocephalic and atraumatic  Eyes:      Conjunctiva/sclera: Conjunctivae normal    Cardiovascular:      Rate and Rhythm: Normal rate and regular rhythm        Heart sounds: No murmur heard  Pulmonary:      Effort: Pulmonary effort is normal  No respiratory distress  Breath sounds: Normal breath sounds  Abdominal:      Palpations: Abdomen is soft  Tenderness: There is no abdominal tenderness  Musculoskeletal:      Cervical back: Neck supple  Right lower leg: No edema  Left lower leg: No edema  Skin:     General: Skin is warm and dry  Neurological:      General: No focal deficit present  Mental Status: She is alert  Cranial Nerves: No cranial nerve deficit  Sensory: No sensory deficit  Motor: No weakness  Deep Tendon Reflexes: Reflexes normal         Additional Data:     Labs:  Results from last 7 days   Lab Units 04/24/22  0510   WBC Thousand/uL 6 38   HEMOGLOBIN g/dL 11 0*   HEMATOCRIT % 34 6*   PLATELETS Thousands/uL 226     Results from last 7 days   Lab Units 04/24/22  0510 04/23/22  0559 04/23/22  0559   SODIUM mmol/L 140   < > 140   POTASSIUM mmol/L 4 3   < > 4 2   CHLORIDE mmol/L 104   < > 104   CO2 mmol/L 28   < > 29   BUN mg/dL 20   < > 19   CREATININE mg/dL 1 12   < > 1 16   ANION GAP mmol/L 8   < > 7   CALCIUM mg/dL 8 9   < > 8 7   ALBUMIN g/dL  --   --  3 0*   TOTAL BILIRUBIN mg/dL  --   --  0 29   ALK PHOS U/L  --   --  142*   ALT U/L  --   --  12   AST U/L  --   --  17   GLUCOSE RANDOM mg/dL 183*   < > 150*    < > = values in this interval not displayed       Results from last 7 days   Lab Units 04/22/22 2010   INR  0 92     Results from last 7 days   Lab Units 04/24/22  0810 04/23/22  2142 04/23/22  1715 04/23/22  1051 04/23/22  0730 04/23/22  0204 04/22/22 2001   POC GLUCOSE mg/dl 235* 157* 170* 180* 192* 141* 168*     Results from last 7 days   Lab Units 04/22/22 2010   HEMOGLOBIN A1C % 8 6*           Lines/Drains:  Invasive Devices  Report    Peripheral Intravenous Line            Peripheral IV 04/22/22 Left Antecubital 1 day    Peripheral IV 04/22/22 Right Antecubital 1 day Telemetry:  Telemetry Orders (From admission, onward)             48 Hour Telemetry Monitoring  Continuous x 48 hours        References:    Telemetry Guidelines   Question:  Reason for 48 Hour Telemetry  Answer:  Acute CVA (<24 hrs old, hemispheric strokes, selected brainstem strokes, cardiac arrhythmias)                 Telemetry Reviewed: Normal Sinus Rhythm  Indication for Continued Telemetry Use: Acute CVA    Imaging: No pertinent imaging reviewed  Recent Cultures (last 7 days):         Last 24 Hours Medication List:   Current Facility-Administered Medications   Medication Dose Route Frequency Provider Last Rate    albuterol  2 puff Inhalation Q6H PRN Nevaeh Saenz MD      aspirin  81 mg Oral Daily Audi Valentine, DO      clopidogrel  75 mg Oral Daily Audi Valentine, DO      enoxaparin  30 mg Subcutaneous Daily Audi Valentine,       guaiFENesin  1,200 mg Oral BID PRN Reese Peña MD      insulin lispro  1-6 Units Subcutaneous TID AC Audi Valentine, DO      labetalol  10 mg Intravenous Q6H PRN Nabila Galvez MD      latanoprost  1 drop Both Eyes HS Nevaeh Saenz MD      levothyroxine  37 5 mcg Oral Early Morning Nevaeh Saenz MD      lisinopril  20 mg Oral Daily Nabila Galvez MD      metoprolol tartrate  12 5 mg Oral Q12H [address] Ma Gena Galvez MD      nitrofurantoin  50 mg Oral HS Audi Valentine,       oxybutynin  5 mg Oral Daily David Omer MD      oxyCODONE-acetaminophen  1 tablet Oral Q6H PRN Audi Valentine,       pantoprazole  40 mg Oral Early Morning Nevaeh Saenz MD      pravastatin  10 mg Oral HS Nevaeh Saenz MD          Today, Patient Was Seen By: Declan Gallegos MD    **Please Note: This note may have been constructed using a voice recognition system  **

## 2022-04-24 NOTE — PROGRESS NOTES
NEUROLOGY RESIDENCY PROGRESS NOTE     Name: Alma Delia Falcon   Age & Sex: 76 y o  female   MRN: [id number]  Unit/Bed#: S -01   Encounter: [id number]    Alma Delia Falcon will need follow up in in 4 weeks with neurovascular AP or attending   She will not require outpatient neurological testing  Schedule follow up: This should be completed prior to removing patient from list or discharge       ASSESSMENT & PLAN     * Stroke-like symptoms  [address] Alvarado is a 76 y o  female  hypertension, diabetes, hypothyroidism, hyperlipidemia and overactive bladder who presents with right facial numbness and dysarthria  Woke up with sx  Stroke alert activated on 4/22/2022  7:58 PM with initial NIHSS of 1  Initial BP on arrival was Blood Pressure: (!) 178/77  As a result of unclear onset of sx patient was determined to not be a candidate for tPA  BP over last 24 hours: Blood Pressure: 153/73  current BP: Blood Pressure: 153/73   A1C 8 6, LDL 92    Imaging:  CTH:  hypodensity along the right basal ganglia  New since 2019 but appears chronic  Repeat CTH: Mild, chronic microangiopathy and small bilateral hypodensities in the right basal ganglia and left subinsular cortex are stable  No new or large evolving infarction  CTA: moderate to severe stenosis of the right M1 segment    MRI:Unable to do because of spinal cord stimulator  Echocardiogram: pending   Telemetry: monitor     Plan:  Stroke pathway   Goal BP: 140-170  Repeat head CT no new findings continue DAPT for 3 week then aspirin monotherapy   Statin  A1c/lipid panel as above  needs better glycemic control   LDL 92, Goal LDL < 70, recommend switching to atorvastatin 40 mg as outpatient   Maintain glucose below 200  Unable to do MRI given spinal cord stimulator   Echo pending  Neuro checks  Monitor on telemetry  Medical management as per primary team appreciated  PT/OT/Speech consults appreciated when able  Follow up with neurovascular SUBJECTIVE     Patient was seen and examined  No acute events overnight  She denies any recurrence of her dizziness slurred speech  She reports that she did not sleep well at night  She typically takes Ambien p r n  At when she cannot sleep well  She reports that this morning she had a recurrence of numbness along her peripheral face  She believes that this is in the setting extremely elevated blood pressure  She has been up and ambulating without any problems  Review of Systems   Constitutional: Negative for fever  Musculoskeletal: Negative for gait problem  Neurological: Positive for numbness  Negative for dizziness, speech difficulty and weakness  Psychiatric/Behavioral: Negative for confusion  OBJECTIVE     Patient ID: Masoud Espinoza is a 76 y o  female  Vitals:    22 2129 22 2300 22 0500 22 0809   BP: (!) 224/101 (!) 180/84 (!) 185/83 153/73   BP Location: Left arm Left arm  Right arm   Pulse: 96   86   Resp: 18   18   Temp: 98 3 °F (36 8 °C)   98 6 °F (37 °C)   TempSrc: Oral   Oral   SpO2: 95%   97%   Weight:          Temperature:   Temp (24hrs), Av 3 °F (36 8 °C), Min:98 2 °F (36 8 °C), Max:98 6 °F (37 °C)    Temperature: 98 6 °F (37 °C)      Physical Exam  Eyes:      Extraocular Movements: EOM normal    Neurological:      Mental Status: She is oriented to person, place, and time  Coordination: Finger-Nose-Finger Test normal           Neurologic Exam     Mental Status   Oriented to person, place, and time  Oriented to place, month, year, not date     Cranial Nerves     CN II   Visual fields full to confrontation  CN III, IV, VI   Extraocular motions are normal      CN V   Facial sensation intact  CN VII   Facial expression full, symmetric       CN VIII   CN VIII normal      CN IX, X   CN IX normal    CN X normal      CN XI   CN XI normal     No dysarthria  Decreased pinprick sensation along the periphery of the face bilaterally, no clearly follow a V1, V2 or V 3 distribution  Unlikely to be neurological       Motor Exam  Normal strength throughout     Sensory Exam   Normal and symmetric pinprick in the upper and lower extremities        Gait, Coordination, and Reflexes     Coordination   Finger to nose coordination: normal Trace reflexes throughout          LABORATORY DATA     Labs: I have personally reviewed pertinent reports  Results from last 7 days   Lab Units 04/24/22 0510 04/22/22 2010   WBC Thousand/uL 6 38 5 79   HEMOGLOBIN g/dL 11 0* 11 1*   HEMATOCRIT % 34 6* 35 5   PLATELETS Thousands/uL 226 232      Results from last 7 days   Lab Units 04/24/22  0510 04/23/22  0559 04/22/22 2010   SODIUM mmol/L 140 140 139   POTASSIUM mmol/L 4 3 4 2 4 1   CHLORIDE mmol/L 104 104 102   CO2 mmol/L 28 29 28   BUN mg/dL 20 19 17   CREATININE mg/dL 1 12 1 16 1 37*   CALCIUM mg/dL 8 9 8 7 8 9   ALK PHOS U/L  --  142*  --    ALT U/L  --  12  --    AST U/L  --  17  --      Results from last 7 days   Lab Units 04/23/22  0559   MAGNESIUM mg/dL 1 9     Results from last 7 days   Lab Units 04/23/22  0559   PHOSPHORUS mg/dL 3 9      Results from last 7 days   Lab Units 04/22/22 2010   INR  0 92   PTT seconds 30               IMAGING & DIAGNOSTIC TESTING     Radiology Results: I have personally reviewed pertinent reports  CT head wo contrast   Final Result by Annia Emerson MD (04/24 1152)         1  Mild, chronic microangiopathy and small bilateral hypodensities in the right basal ganglia and left subinsular cortex are stable  No new or large evolving infarction  2   No acute intracranial hemorrhage  3   Mild sphenoid sinusitis  Workstation performed: [id number]         CTA stroke alert (head/neck)   Final Result by Lilia Barron MD (04/22 2040)      Moderate to severe nonocclusive right M1 segment stenosis              I personally discussed this study with neurologist on 4/22/2022 at 8:24 PM  Workstation performed: [id number]         CT stroke alert brain   Final Result by Royce Guthrie MD (04/22 2030)      No evidence of acute intracranial hemorrhage  Right basal ganglia low-attenuation age-indeterminate ischemia, correlate with noncontrast brain MRI  I personally discussed this study with neurologist on 4/22/2022 at 8:24 PM                 Workstation performed: [id number]             Other Diagnostic Testing: I have personally reviewed pertinent reports  ACTIVE MEDICATIONS     Current Facility-Administered Medications   Medication Dose Route Frequency    albuterol (PROVENTIL HFA,VENTOLIN HFA) inhaler 2 puff  2 puff Inhalation Q6H PRN    aspirin chewable tablet 81 mg  81 mg Oral Daily    clopidogrel (PLAVIX) tablet 75 mg  75 mg Oral Daily    enoxaparin (LOVENOX) subcutaneous injection 30 mg  30 mg Subcutaneous Daily    guaiFENesin (MUCINEX) 12 hr tablet 1,200 mg  1,200 mg Oral BID PRN    insulin lispro (HumaLOG) 100 units/mL subcutaneous injection 1-6 Units  1-6 Units Subcutaneous TID AC    labetalol (NORMODYNE) injection 10 mg  10 mg Intravenous Q6H PRN    latanoprost (XALATAN) 0 005 % ophthalmic solution 1 drop  1 drop Both Eyes HS    levothyroxine tablet 37 5 mcg  37 5 mcg Oral Early Morning    lisinopril (ZESTRIL) tablet 20 mg  20 mg Oral Daily    metoprolol tartrate (LOPRESSOR) partial tablet 12 5 mg  12 5 mg Oral Q12H ARY    nitrofurantoin (MACRODANTIN) capsule 50 mg  50 mg Oral HS    oxybutynin (DITROPAN-XL) 24 hr tablet 5 mg  5 mg Oral Daily    oxyCODONE-acetaminophen (PERCOCET) 5-325 mg per tablet 1 tablet  1 tablet Oral Q6H PRN    pantoprazole (PROTONIX) EC tablet 40 mg  40 mg Oral Early Morning    pravastatin (PRAVACHOL) tablet 10 mg  10 mg Oral HS       Prior to Admission medications    Medication Sig Start Date End Date Taking?  Authorizing Provider   albuterol (PROVENTIL HFA,VENTOLIN HFA) 90 mcg/act inhaler Inhale 2 puffs every 6 (six) hours as needed for wheezing or shortness of breath 5/11/20   Vanda Baires DO   ALPRAZolam Sarah Zeferino) 0 25 mg tablet Take 1 tablet (0 25 mg total) by mouth 3 (three) times a day as needed for anxiety 1/3/22   NANY Carrillo   baclofen 10 mg tablet TAKE ONE TABLET BY MOUTH THREE TIMES DAILY AS NEEDED FOR MUSCLE SPASM 2/28/22   NANY Carrillo   dicyclomine (BENTYL) 20 mg tablet Take 1 tablet (20 mg total) by mouth every 6 (six) hours as needed (bowel spasm) 11/23/21   NANY Carrillo   estradiol (ESTRACE VAGINAL) 0 1 mg/g vaginal cream Insert 1 g into the vagina 3 (three) times a week 3/28/22   Ashleigh Kaur PA-C   gabapentin (NEURONTIN) 300 mg capsule TAKE ONE CAPSULE BY MOUTH DAILY IN MORNING AND TWO CAPSULES BY MOUTH AT BEDTIME 1/27/22   NANY Carrillo   glucose blood test strip Bid testing 2/27/19   Vanda Baires DO   latanoprost (XALATAN) 0 005 % ophthalmic solution Administer 1 drop to both eyes daily at bedtime 10/2/20 7/20/21  Vanda Baires DO   levothyroxine 25 mcg tablet Take 1 5 tablets (37 5 mcg total) by mouth daily 8/9/21 9/8/21  Vanda Baires DO   lisinopril (ZESTRIL) 20 mg tablet Take 1 tablet (20 mg total) by mouth daily 6/2/21 9/30/21  NANY Carrillo   meclizine (ANTIVERT) 25 mg tablet Take 1 tablet (25 mg total) by mouth 4 (four) times a day as needed for dizziness 3/23/22 5/2/22  NANY Carrillo   metFORMIN (GLUCOPHAGE) 1000 MG tablet Take 1 tablet (1,000 mg total) by mouth 2 (two) times a day with meals 3/9/22   NANY Carrillo   metoclopramide (REGLAN) 10 mg tablet Take 1 tablet (10 mg total) by mouth 4 (four) times a day 6/24/21   NANY Carrillo   Milnacipran HCl (Savella) 100 MG TABS Take 1 tablet (100 mg total) by mouth daily 9/13/21   Vanda Baires, DO   nitrofurantoin (MACRODANTIN) 50 mg capsule Take 1 capsule (50 mg total) by mouth daily at bedtime 9/10/21   Vanda Baires, DO   ondansetron (ZOFRAN) 8 mg tablet Take 1 tablet (8 mg total) by mouth every 8 (eight) hours as needed for nausea or vomiting 3/9/22   NANY Glasgow   oxybutynin (DITROPAN) 5 mg tablet Take 1 tablet (5 mg total) by mouth 3 (three) times a day 2/24/22   NANY Glasgow   oxyCODONE-acetaminophen (PERCOCET)  mg per tablet Take 1 tablet by mouth every 6 (six) hours as needed for severe pain Max Daily Amount: 4 tablets 4/20/22   NANY Glasgow   pantoprazole (PROTONIX) 40 mg tablet Take 1 tablet (40 mg total) by mouth daily 7/22/21 10/20/21  Abraham Pereira MD   phenazopyridine (PYRIDIUM) 100 mg tablet Take 1 tablet (100 mg total) by mouth 3 (three) times a day as needed for bladder spasms 11/12/21   NANY Glasgow   pravastatin (PRAVACHOL) 10 mg tablet Take 1 tablet (10 mg total) by mouth daily at bedtime 3/4/22   NANY Glasgow   trospium chloride (SANCTURA) 20 mg tablet Take 1 tablet (20 mg total) by mouth 2 (two) times a day 3/28/22   Ashleigh Kaur PA-C   zolpidem (AMBIEN) 5 mg tablet Take 1 tablet (5 mg total) by mouth daily at bedtime as needed for sleep 4/20/22   NANY Glasgow         VTE Pharmacologic Prophylaxis: Enoxaparin (Lovenox)  VTE Mechanical Prophylaxis: sequential compression device    ==  MD Sampson Morales 71 Neurology Residency, PGY-3

## 2022-04-24 NOTE — ASSESSMENT & PLAN NOTE
Lab Results   Component Value Date    HGBA1C 8 6 (H) 04/22/2022     Recent Labs     04/24/22  1536 04/24/22  2211 04/25/22  0759 04/25/22  1117   POCGLU 208* 241* 171* 266*     Blood Sugar Average: Last 72 hrs:  (P) 057 [id number]     Plan  · Continue home metformin  · Follow-up with PCP regarding blood sugar adjustment (goal less than 200)

## 2022-04-24 NOTE — ASSESSMENT & PLAN NOTE
Lab Results   Component Value Date    HGBA1C 8 6 (H) 04/22/2022       Recent Labs     04/23/22  1051 04/23/22  1715 04/23/22  2142 04/24/22  0810   POCGLU 180* 170* 157* 235*       Blood Sugar Average: Last 72 hrs:  (P) 177 [id number]     Plan  · Hold metformin  · Insulin SS with meals  · POCT glucose checks before meals and at HS  · Consistent carbohydrate diet

## 2022-04-24 NOTE — ASSESSMENT & PLAN NOTE
· Resume  Home Lisinopril 20mg daily  · Continue Metoprolol tartrate 12 5mg BID  · Recommend patient to take blood pressure measurements at home about 1 to 2 times a day and write down the numbers  · Follow-up with PCP to adjust blood pressure medications

## 2022-04-24 NOTE — ASSESSMENT & PLAN NOTE
· Patient presented with expressive aphasia, generalized fatigue No facial droop,arm weakness or lateralization neurologically  Patient was reported to have s/s in the morning presented to ED in the evening  Unsure of last known well time  · PMH: HTN compliant with medication  Workup:   In the ED, /77   EKG - NSR Rate 100   Tele: normal sinus rhythm rate of 90s   CT of the head - No evidence of acute intracranial hemorrhage  Right basal ganglia low-attenuation age-indeterminate ischemia, correlate with noncontrast brain MRI   CT of the Neck with contrast - Moderate to severe nonocclusive right M1 segment stenosis   Lipid 92, A1C 8 6   Echo LVEF 55%, G1DD   PT/OT: HHC; Speech: can eval outpatient or HHC if concerns    Patient cannot have MRI due to MRI incompatible back stimulator device  Plan   · Continue taking aspirin and Plavix for 21 days  Then transition to aspirin monotherapy after  · Take atorvastatin 40 mg daily instead of pravastatin  · Patient is medically stable to be discharged home with home health care  · Follow-up with PCP in a week  · Follow-up with neurovascular in 4 weeks

## 2022-04-25 VITALS
WEIGHT: 199 LBS | BODY MASS INDEX: 39.07 KG/M2 | OXYGEN SATURATION: 95 % | TEMPERATURE: 98.4 F | HEIGHT: 60 IN | SYSTOLIC BLOOD PRESSURE: 157 MMHG | HEART RATE: 72 BPM | DIASTOLIC BLOOD PRESSURE: 79 MMHG | RESPIRATION RATE: 18 BRPM

## 2022-04-25 LAB
ANION GAP SERPL CALCULATED.3IONS-SCNC: 8 MMOL/L (ref 4–13)
AORTIC ROOT: 3.2 CM
APICAL FOUR CHAMBER EJECTION FRACTION: 57 %
ASCENDING AORTA: 3.4 CM (ref 2.03–3.04)
BUN SERPL-MCNC: 22 MG/DL (ref 5–25)
CALCIUM SERPL-MCNC: 8.7 MG/DL (ref 8.3–10.1)
CHLORIDE SERPL-SCNC: 105 MMOL/L (ref 100–108)
CO2 SERPL-SCNC: 26 MMOL/L (ref 21–32)
CREAT SERPL-MCNC: 1.04 MG/DL (ref 0.6–1.3)
E WAVE DECELERATION TIME: 185 MS
ERYTHROCYTE [DISTWIDTH] IN BLOOD BY AUTOMATED COUNT: 14.2 % (ref 11.6–15.1)
FRACTIONAL SHORTENING: 31 % (ref 28–44)
GFR SERPL CREATININE-BSD FRML MDRD: 51 ML/MIN/1.73SQ M
GLUCOSE SERPL-MCNC: 150 MG/DL (ref 65–140)
GLUCOSE SERPL-MCNC: 171 MG/DL (ref 65–140)
GLUCOSE SERPL-MCNC: 266 MG/DL (ref 65–140)
HCT VFR BLD AUTO: 34.4 % (ref 34.8–46.1)
HGB BLD-MCNC: 10.5 G/DL (ref 11.5–15.4)
INTERVENTRICULAR SEPTUM IN DIASTOLE (PARASTERNAL SHORT AXIS VIEW): 1.6 CM
INTERVENTRICULAR SEPTUM: 1.6 CM (ref 0.53–1)
LEFT ATRIUM AREA SYSTOLE SINGLE PLANE A4C: 21.2 CM2
LEFT ATRIUM SIZE: 4 CM
LEFT INTERNAL DIMENSION IN SYSTOLE: 2.5 CM (ref 2.91–4.4)
LEFT VENTRICULAR INTERNAL DIMENSION IN DIASTOLE: 3.6 CM (ref 4.78–7.12)
LEFT VENTRICULAR POSTERIOR WALL IN END DIASTOLE: 1.4 CM (ref 0.52–0.98)
LEFT VENTRICULAR STROKE VOLUME: 31 ML
LVSV (TEICH): 31 ML
MCH RBC QN AUTO: 26.4 PG (ref 26.8–34.3)
MCHC RBC AUTO-ENTMCNC: 30.5 G/DL (ref 31.4–37.4)
MCV RBC AUTO: 87 FL (ref 82–98)
MV E'TISSUE VEL-SEP: 6 CM/S
MV PEAK A VEL: 1.2 M/S
MV PEAK E VEL: 74 CM/S
MV STENOSIS PRESSURE HALF TIME: 54 MS
MV VALVE AREA P 1/2 METHOD: 4.07 CM2
PLATELET # BLD AUTO: 224 THOUSANDS/UL (ref 149–390)
PMV BLD AUTO: 11.4 FL (ref 8.9–12.7)
POTASSIUM SERPL-SCNC: 4 MMOL/L (ref 3.5–5.3)
RA PRESSURE ESTIMATED: 3 MMHG
RBC # BLD AUTO: 3.97 MILLION/UL (ref 3.81–5.12)
RIGHT ATRIUM AREA SYSTOLE A4C: 19.9 CM2
RIGHT VENTRICLE ID DIMENSION: 3 CM
RV PSP: 25 MMHG
SL CV LV EF: 55
SL CV PED ECHO LEFT VENTRICLE DIASTOLIC VOLUME (MOD BIPLANE) 2D: 53 ML
SL CV PED ECHO LEFT VENTRICLE SYSTOLIC VOLUME (MOD BIPLANE) 2D: 22 ML
SODIUM SERPL-SCNC: 139 MMOL/L (ref 136–145)
TR MAX PG: 22 MMHG
TR PEAK VELOCITY: 2.4 M/S
TRICUSPID VALVE PEAK REGURGITATION VELOCITY: 2.35 M/S
WBC # BLD AUTO: 5.88 THOUSAND/UL (ref 4.31–10.16)
Z-SCORE OF ASCENDING AORTA: 3.41
Z-SCORE OF INTERVENTRICULAR SEPTUM IN END DIASTOLE: 7.03
Z-SCORE OF LEFT VENTRICULAR DIMENSION IN END DIASTOLE: -4.82
Z-SCORE OF LEFT VENTRICULAR DIMENSION IN END SYSTOLE: -2.84
Z-SCORE OF LEFT VENTRICULAR POSTERIOR WALL IN END DIASTOLE: 5.46

## 2022-04-25 PROCEDURE — 93306 TTE W/DOPPLER COMPLETE: CPT | Performed by: INTERNAL MEDICINE

## 2022-04-25 PROCEDURE — 82948 REAGENT STRIP/BLOOD GLUCOSE: CPT

## 2022-04-25 PROCEDURE — 80048 BASIC METABOLIC PNL TOTAL CA: CPT

## 2022-04-25 PROCEDURE — 99239 HOSP IP/OBS DSCHRG MGMT >30: CPT | Performed by: INTERNAL MEDICINE

## 2022-04-25 PROCEDURE — 85027 COMPLETE CBC AUTOMATED: CPT

## 2022-04-25 RX ORDER — CLOPIDOGREL BISULFATE 75 MG/1
75 TABLET ORAL DAILY
Qty: 21 TABLET | Refills: 0 | Status: SHIPPED | OUTPATIENT
Start: 2022-04-23 | End: 2022-05-20 | Stop reason: ALTCHOICE

## 2022-04-25 RX ORDER — ATORVASTATIN CALCIUM 40 MG/1
40 TABLET, FILM COATED ORAL DAILY
Qty: 30 TABLET | Refills: 0 | Status: SHIPPED | OUTPATIENT
Start: 2022-04-25 | End: 2022-06-03 | Stop reason: SDUPTHER

## 2022-04-25 RX ORDER — ASPIRIN 81 MG/1
81 TABLET, CHEWABLE ORAL DAILY
Qty: 30 TABLET | Refills: 0 | Status: ON HOLD | OUTPATIENT
Start: 2022-04-26 | End: 2022-06-21

## 2022-04-25 RX ADMIN — OXYBUTYNIN CHLORIDE 5 MG: 5 TABLET, EXTENDED RELEASE ORAL at 08:40

## 2022-04-25 RX ADMIN — OXYCODONE HYDROCHLORIDE AND ACETAMINOPHEN 1 TABLET: 5; 325 TABLET ORAL at 08:49

## 2022-04-25 RX ADMIN — ENOXAPARIN SODIUM 30 MG: 30 INJECTION SUBCUTANEOUS at 08:42

## 2022-04-25 RX ADMIN — PANTOPRAZOLE SODIUM 40 MG: 40 TABLET, DELAYED RELEASE ORAL at 05:47

## 2022-04-25 RX ADMIN — INSULIN LISPRO 1 UNITS: 100 INJECTION, SOLUTION INTRAVENOUS; SUBCUTANEOUS at 08:42

## 2022-04-25 RX ADMIN — ASPIRIN 81 MG CHEWABLE TABLET 81 MG: 81 TABLET CHEWABLE at 08:40

## 2022-04-25 RX ADMIN — LISINOPRIL 20 MG: 20 TABLET ORAL at 08:40

## 2022-04-25 RX ADMIN — CLOPIDOGREL BISULFATE 75 MG: 75 TABLET ORAL at 08:40

## 2022-04-25 RX ADMIN — LEVOTHYROXINE SODIUM 37.5 MCG: 75 TABLET ORAL at 05:46

## 2022-04-25 RX ADMIN — Medication 12.5 MG: at 08:40

## 2022-04-25 RX ADMIN — INSULIN LISPRO 3 UNITS: 100 INJECTION, SOLUTION INTRAVENOUS; SUBCUTANEOUS at 12:37

## 2022-04-25 NOTE — DISCHARGE INSTRUCTIONS
Hypertension   AMBULATORY CARE:   Hypertension  is high blood pressure  Your blood pressure is the force of your blood moving against the walls of your arteries  Hypertension causes your blood pressure to get so high that your heart has to work much harder than normal  This can damage your heart  The cause of hypertension may not be known  This is called essential or primary hypertension  Hypertension caused by another medical condition, such as kidney disease, is called secondary hypertension  Common symptoms include the following:   · Headache    · Blurred vision    · Chest pain    · Dizziness or weakness    · Trouble breathing    · Nosebleeds    Call your local emergency number (911 in the [facility] [facility]) or have someone call if:   · You have chest pain  · You have any of the following signs of a heart attack:      ? Squeezing, pressure, or pain in your chest    ? You may  also have any of the following:     § Discomfort or pain in your back, neck, jaw, stomach, or arm    § Shortness of breath    § Nausea or vomiting    § Lightheadedness or a sudden cold sweat    · You become confused or have trouble speaking  · You suddenly feel lightheaded or have trouble breathing  Seek care immediately if:   · You have a severe headache or vision loss  · You have weakness in an arm or leg  Call your doctor or cardiologist if:   · You feel faint, dizzy, confused, or drowsy  · You have been taking your blood pressure medicine but your pressure is higher than your provider says it should be  · You have questions or concerns about your condition or care  What you need to know about the stages of hypertension:       · Normal blood pressure is 119/79 or lower   Your healthcare provider may only check your blood pressure each year if it stays at a normal level  · Elevated blood pressure is 120/79 to 129/79   This is sometimes called prehypertension   Your healthcare provider may suggest lifestyle changes to help lower your blood pressure to a normal level  He or she may then check it again in 3 to 6 months  · Stage 1 hypertension is 130/80  to 139/89   Your provider may recommend lifestyle changes, medication, and checks every 3 to 6 months until your blood pressure is controlled  · Stage 2 hypertension is 140/90 or higher   Your provider will recommend lifestyle changes and have you take 2 kinds of hypertension medicines  You will also need to have your blood pressure checked monthly until it is controlled  Treatment for hypertension  may include medicine to lower your blood pressure and lower your cholesterol level  A low cholesterol level helps prevent heart disease and makes it easier to control your blood pressure  Take your medicine exactly as directed  You may also need to make lifestyle changes  Manage hypertension:   · Check your blood pressure at home  Avoid smoking, caffeine, and exercise at least 30 minutes before checking your blood pressure  Sit and rest for 5 minutes before you take your blood pressure  Extend your arm and support it on a flat surface  Your arm should be at the same level as your heart  Follow the directions that came with your blood pressure monitor  Check your blood pressure 2 times, 1 minute apart, before you take your medicine in the morning  Also check your blood pressure before your evening meal  Keep a record of your readings and bring it to your follow-up visits  Ask your healthcare provider what your blood pressure should be  · Manage any other health conditions you have  Health conditions such as diabetes can increase your risk for hypertension  Follow your healthcare provider's instructions and take all your medicines as directed  · Ask about all medicines  Certain medicines can increase your blood pressure  Examples include oral birth control pills, decongestants, herbal supplements, and NSAIDs, such as ibuprofen   Your healthcare provider can tell you which medicines are safe for you to take  This includes prescription and over-the-counter medicines  Lifestyle changes you can make to manage hypertension:  Your healthcare provider may recommend you work with a team to manage hypertension  The team may include medical experts such as a dietitian, an exercise or physical therapist, and a behavior therapist  Your family members may be included in helping you create lifestyle changes  · Limit sodium (salt) as directed  Too much sodium can affect your fluid balance  Check labels to find low-sodium or no-salt-added foods  Some low-sodium foods use potassium salts for flavor  Too much potassium can also cause health problems  Your healthcare provider will tell you how much sodium and potassium are safe for you to have in a day  He or she may recommend that you limit sodium to 2,300 mg a day  · Follow the meal plan recommended by your healthcare provider  A dietitian or your provider can give you more information on low-sodium plans or the DASH (Dietary Approaches to Stop Hypertension) eating plan  The DASH plan is low in sodium, processed sugar, unhealthy fats, and total fat  It is high in potassium, calcium, and fiber  These can be found in vegetables, fruit, and whole-grain foods  · Be physically active throughout the day  Physical activity, such as exercise, can help control your blood pressure and your weight  Be physically active for at least 30 minutes per day, on most days of the week  Include aerobic activity, such as walking or riding a bicycle  Also include strength training at least 2 times each week  Your healthcare providers can help you create a physical activity plan  · Decrease stress  This may help lower your blood pressure  Learn ways to relax, such as deep breathing or listening to music  · Limit alcohol as directed  Alcohol can increase your blood pressure   A drink of alcohol is 12 ounces of beer, 5 ounces of wine, or 1½ ounces of liquor  · Do not smoke  Nicotine and other chemicals in cigarettes and cigars can increase your blood pressure and also cause lung damage  Ask your healthcare provider for information if you currently smoke and need help to quit  E-cigarettes or smokeless tobacco still contain nicotine  Talk to your healthcare provider before you use these products  Follow up with your doctor or cardiologist as directed: You will need to return to have your blood pressure checked and to have other lab tests done  Write down your questions so you remember to ask them during your visits  © Copyright [employer] 2022 Information is for End User's use only and may not be sold, redistributed or otherwise used for commercial purposes  All illustrations and images included in CareNotes® are the copyrighted property of A D A M [employer] Inc  or Norma Mcgowan   The above information is an  only  It is not intended as medical advice for individual conditions or treatments  Talk to your doctor, nurse or pharmacist before following any medical regimen to see if it is safe and effective for you

## 2022-04-25 NOTE — DISCHARGE SUMMARY
[facility]  Discharge- Hazeline Freeze 1945, 76 y o  female MRN: [id number]  Unit/Bed#: S -01 Encounter: [id number]  Primary Care Provider: NANY Flores   Date and time admitted to hospital: 4/22/2022  7:58 PM    * Stroke-like symptoms  Assessment & Plan  · Patient presented with expressive aphasia, generalized fatigue No facial droop,arm weakness or lateralization neurologically  Patient was reported to have s/s in the morning presented to ED in the evening  Unsure of last known well time  · PMH: HTN compliant with medication  Workup:   In the ED, /77   EKG - NSR Rate 100   Tele: normal sinus rhythm rate of 90s   CT of the head - No evidence of acute intracranial hemorrhage  Right basal ganglia low-attenuation age-indeterminate ischemia, correlate with noncontrast brain MRI   CT of the Neck with contrast - Moderate to severe nonocclusive right M1 segment stenosis   Lipid 92, A1C 8 6   Echo LVEF 55%, G1DD   PT/OT: HHC; Speech: can eval outpatient or HHC if concerns    Patient cannot have MRI due to MRI incompatible back stimulator device  Plan   · Continue taking aspirin and Plavix for 21 days  Then transition to aspirin monotherapy after  · Take atorvastatin 40 mg daily instead of pravastatin  · Patient is medically stable to be discharged home with home health care  · Follow-up with PCP in a week  · Follow-up with neurovascular in 4 weeks        Type 2 diabetes mellitus with hyperglycemia, without long-term current use of insulin [facility])  Assessment & Plan  Lab Results   Component Value Date    HGBA1C 8 6 (H) 04/22/2022     Recent Labs     04/24/22  1536 04/24/22  2211 04/25/22  0759 04/25/22  1117   POCGLU 208* 241* 171* 266*     Blood Sugar Average: Last 72 hrs:  (P) 022 [id number]     Plan  · Continue home metformin  · Follow-up with PCP regarding blood sugar adjustment (goal less than 200)    Hypertension  Assessment & Plan  · Resume  Home Lisinopril 20mg daily  · Continue Metoprolol tartrate 12 5mg BID  · Recommend patient to take blood pressure measurements at home about 1 to 2 times a day and write down the numbers  · Follow-up with PCP to adjust blood pressure medications  Overactive bladder  Assessment & Plan  · Continue home Myrbetriq  Hypothyroidism  Assessment & Plan  · Continue home levothyroxine 37 5 mcg  High cholesterol  Assessment & Plan  · Discontinue pravastatin 10 mg q h felisa Damon · Start taking atorvastatin 40 mg daily instead    Medical Problems             Resolved Problems  Date Reviewed: 4/25/2022    None              Discharging Resident: Gina Carter MD  Discharging Attending: Giulia Roque MD  PCP: Lopez Damian, [address] [place] [address]  Admission Date:   Admission Orders (From admission, onward)     Ordered        04/23/22 1735  Inpatient Admission  Once            04/22/22 2204  Place in Observation  Once                      Discharge Date: 04/25/22    Consultations During Hospital Stay:  · Neurology, PT/OT    Procedures Performed:   · None    Significant Findings / Test Results:   · 4/22:  CT stroke: Right basal ganglia low-attenuation age-indeterminate ischemia, correlate with noncontrast brain MRI  · 4/22:  CTA head and neck: Moderate to severe nonocclusive right M1 segment stenosis  Incidental Findings:   · None     Test Results Pending at Discharge (will require follow up): · None     Outpatient Tests Requested:  · None    Complications:  None    Reason for Admission:  Stroke-like symptoms    Hospital Course: Michelle Yang is a 76 y o  female patient who originally presented to the hospital on 4/22/2022 due to expressive aphasia, generalized fatigue and right face numbness  Unsure of last known well time  Stroke alert was initiated  Neurology consulted  In ED, hypertensive with a blood pressure 178/77  EKG with normal sinus rhythm  Labs unremarkable  NIHSS is 1  Not a tPA candidate due to unclear timing  CT head showed right basal ganglia low-attenuation age indeterminate ischemia, correlate with noncontrast brain MRI  However patient cannot get MRI due to incompatible back stimulator device  CTA head and neck showed a moderate to severe not occlusive right M1 segment stenosis  : Echo LVEF 55% G1 DD  Patient's symptoms have completely resolved  Her blood pressure has been adjusted to goal SBP of 140-170  PT/OT recommended home with home health care  Patient is medically stable to be discharged  She will take aspirin and Plavix for 21 days then aspirin monotherapy afterward  Will take atorvastatin 40 mg daily (discontinue home pravastatin)  Current blood pressure medications inpatient:  Lisinopril 20 mg daily and metoprolol tartrate 12 5 mg b i d  Recommend patient to take blood pressure measurements 1 to 2 times a day and record the numbers for her PCP to adjust blood pressure medication as appropriate  Patient will follow-up with neurovascular in 4 weeks  Please see above list of diagnoses and related plan for additional information  Condition at Discharge: stable    Discharge Day Visit / Exam:   Subjective:  Patient is doing well this morning  No new complaint  No new neurological signs/symptoms  Vitals: Blood Pressure: 157/79 (04/25/22 1100)  Pulse: 72 (04/25/22 1100)  Temperature: 98 4 °F (36 9 °C) (04/25/22 1100)  Temp Source: Oral (04/25/22 1100)  Respirations: 18 (04/25/22 1100)  Height: 5' (152 4 cm) (04/24/22 1534)  Weight - Scale: 90 3 kg (199 lb) (04/24/22 1534)  SpO2: 95 % (04/25/22 1100)  Exam:   Physical Exam  Vitals and nursing note reviewed  Constitutional:       General: She is not in acute distress  Appearance: She is well-developed  HENT:      Head: Normocephalic and atraumatic  Nose: Nose normal    Eyes:      Conjunctiva/sclera: Conjunctivae normal    Cardiovascular:      Rate and Rhythm: Normal rate and regular rhythm        Heart sounds: No murmur heard       Pulmonary:      Effort: Pulmonary effort is normal  No respiratory distress  Breath sounds: Normal breath sounds  Abdominal:      Palpations: Abdomen is soft  Tenderness: There is no abdominal tenderness  Musculoskeletal:      Cervical back: Neck supple  Right lower leg: No edema  Left lower leg: No edema  Skin:     General: Skin is warm and dry  Neurological:      Mental Status: She is alert  Mental status is at baseline  Cranial Nerves: No cranial nerve deficit  Sensory: No sensory deficit  Motor: No weakness  Discussion with Family: Updated  (daughter) via phone  Discharge instructions/Information to patient and family:   See after visit summary for information provided to patient and family  Provisions for Follow-Up Care:  See after visit summary for information related to follow-up care and any pertinent home health orders  Disposition:   Home with [facility] Services (Reminder: Complete face to face encounter)    Planned Readmission:  None    Discharge Medications:  See after visit summary for reconciled discharge medications provided to patient and/or family        **Please Note: This note may have been constructed using a voice recognition system**

## 2022-04-25 NOTE — DISCHARGE INSTR - AVS FIRST PAGE
Dear Elin Lincoln,     It was our pleasure to care for you here at [facility], [facility]  It is our hope that we were always able to exceed the expected standards for your care during your stay  You were hospitalized due to stroke  You were cared for on the 3rd floor by Otilia Houser MD under the service of Sally Ortega MD with the [facility] who covers for your primary care physician (PCP), Kate Lopez, while you were hospitalized  If you have any questions or concerns related to this hospitalization, you may contact us at [phone]  For follow up as well as any medication refills, we recommend that you follow up with your primary care physician  A registered nurse will reach out to you by phone within a few days after your discharge to answer any additional questions that you may have after going home    However, at this time we provide for you here, the most important instructions / recommendations at discharge:     Notable Medication Adjustments -     Start taking aspirin 81 mg daily  Start taking clopidogrel (Plavix) 75 mg 1 tablet daily for total of 21 days (until 5/14/2022)  Start taking atorvastatin (Lipitor) 40 mg daily    Please stop taking the following medications  pravastatin (Pravachol) 10 mg daily  Nitrofurantoin  Oxybutynin (Ditropan) 5 mg tablet    Testing Required after Discharge -   None    Important follow up information -   Follow-up with primary care provider Kate Lopez in a week  Follow-up with neurovascular outpatient in about 4 weeks    Other Instructions -   Please taking medications consistently    Please measure blood pressure about 1 to 2 times a day at home and write down the measurements    Follow-up blood pressure measurements with primary care provider    Please review this entire after visit summary as additional general instructions including medication list, appointments, activity, diet, any pertinent wound care, and other additional recommendations from your care team that may be provided for you        Sincerely,     Josette Estevez MD

## 2022-04-25 NOTE — CASE MANAGEMENT
Case Management Progress Note    Patient name Minerva Rosales  Location [address] 361-03 MRN [id number]  : 1945 Date 2022       LOS (days): 2  Geometric Mean LOS (GMLOS) (days):   Days to GMLOS:        OBJECTIVE:        Current admission status: Inpatient  Preferred Pharmacy:   [facility] [address]  [address] [place] [address]  Phone: [phone] Fax: [phone]    [address] [place] [address] [id number] [place] [address]  [id number] [place] [address]  Phone: [phone] Fax: [phone]    [facility] [address] [place] [address]  Phone: [phone] Fax: [phone] [address] [address]  Phone: [phone] Fax: [phone]    Primary Care Provider: NANY Flores    Primary Insurance: [facility]  Secondary Insurance:     PROGRESS NOTE:    [facility] accepted

## 2022-04-25 NOTE — CASE MANAGEMENT
Case Management Assessment & Discharge Planning Note    Patient name Oanh Barron  Location S MS 46/S -73 MRN [id number]  : 1945 Date 2022       Current Admission Date: 2022  Current Admission Diagnosis:Stroke-like symptoms   Patient Active Problem List    Diagnosis Date Noted    Stroke-like symptoms 2022    Hypoalbuminemia due to protein-calorie malnutrition ([facility] ) 2021    Obesity, morbid ([facility] ) 2021    Right lower quadrant abdominal pain 2021    PUD (peptic ulcer disease) 2021    Elevated troponin 2020    Right knee pain 2020    Melena 07/15/2020    Mild intermittent asthma without complication     S/P insertion of spinal cord stimulator 2018    Iron deficiency anemia secondary to inadequate dietary iron intake 2018    Failed back surgical syndrome 2018    Status post lumbar spinal fusion 2018    Pain in right wrist 2018    Hypothyroidism 2017    Degenerative lumbar spinal stenosis 2017    Glaucoma 2017    Overactive bladder 2016    Dyspepsia 2016    High cholesterol 2016    Type 2 diabetes mellitus with hyperglycemia, without long-term current use of insulin ([facility] ) 2015    Anxiety 2015    Chronic pain disorder 2013    Hypertension 2013    Postlaminectomy syndrome, lumbar 2012      LOS (days): 2  Geometric Mean LOS (GMLOS) (days):   Days to GMLOS:     OBJECTIVE:    Risk of Unplanned Readmission Score: 14         Current admission status: Inpatient  Referral Reason: Stroke    Preferred Pharmacy:   [facility] [address]  [address] [place] [address] [address]  Phone: [phone] Fax: [phone]    [address] [place] [address]  Phone: [phone] Fax: [phone] [address] [facility] [address] [place], [place] - [address]  [address]  [address] [address]  Phone: [phone] Fax: [phone] [address] [place] [address]  [address] [facility] [address]  Phone: [phone] Fax: [phone]    Primary Care Provider: NANY Ramos    Primary Insurance: [facility]  Secondary Insurance:     ASSESSMENT:  Jacki 26 Proxies    There are no active Health Care Proxies on file  Patient Information  Admitted from[de-identified] Home  Mental Status: Alert  During Assessment patient was accompanied by: Not accompanied during assessment  Assessment information provided by[de-identified] Patient  Primary Caregiver: Self  Support Systems: Unique 2 do you live in?: [facility] entry access options   Select all that apply : Stairs  Number of steps to enter home : 3  Type of Current Residence: Apartment  Floor Level: 1  Upon entering residence, is there a bedroom on the main floor (no further steps)?: Yes  Upon entering residence, is there a bathroom on the main floor (no further steps)?: Yes  In the last 12 months, was there a time when you were not able to pay the mortgage or rent on time?: No  In the last 12 months, how many places have you lived?: 1  In the last 12 months, was there a time when you did not have a steady place to sleep or slept in a shelter (including now)?: No  Homeless/housing insecurity resource given?: N/A  Living Arrangements: Lives w/ Son    Activities of Daily Living Prior to Admission  Functional Status: Independent  Completes ADLs independently?: Yes  Ambulates independently?: Yes  Does patient use assisted devices?: Yes  Assisted Devices (DME) used: Straight Cane  Does patient currently own DME?: Yes  What DME does the patient currently own?: Straight Cane  Does patient have a history of Outpatient Therapy (PT/OT)?: Yes  Does the patient have a history of Short-Term Rehab?: No  Does patient have a history of [facility]?: Yes (unsure fo agency)  Does patient currently have [facility]?: No         Patient Information Continued  Does patient have prescription coverage?: Yes  Within the past 12 months, you worried that your food would run out before you got the money to buy more : Never true  Within the past 12 months, the food you bought just didnt last and you didnt have money to get more : Never true  Food insecurity resource given?: N/A  Does patient receive dialysis treatments?: No  Does patient have a history of substance abuse?: No  Does patient have a history of Mental Health Diagnosis?: No         Means of Transportation  Means of Transport to Appts[de-identified] Family transport  In the past 12 months, has lack of transportation kept you from medical appointments or from getting medications?: No  In the past 12 months, has lack of transportation kept you from meetings, work, or from getting things needed for daily living?: No  Was application for public transport provided?: N/A        DISCHARGE DETAILS:    Discharge planning discussed with[de-identified] Patient  Freedom of Choice: Yes  Comments - Freedom of Choice: FOC discussed regarding therapy recs for home PT  Pt is interested, no preference in facility at this time  CM placed referral to [facility] in [address] contacted family/caregiver?: Yes  Were Treatment Team discharge recommendations reviewed with patient/caregiver?: Yes  Did patient/caregiver verbalize understanding of patient care needs?: Yes  Were patient/caregiver advised of the risks associated with not following Treatment Team discharge recommendations?: Yes    Contacts  Patient Contacts: Patient  Contact Method:  In Person  Reason/Outcome: [address]         Is the patient interested in [facility] at discharge?: Yes  [address] requested[de-identified] Physical [address] Name[de-identified] [facility] Provider[de-identified] PCP  Home Health Services Needed[de-identified] Evaluate Functional Status and Safety,Gait/ADL Training,Strengthening/Theraputic Exercises to Improve Function  Homebound Criteria Met[de-identified] Uses an Assist Device (i e  cane, walker, etc)  Supporting Clincal Findings[de-identified] Limited Endurance,Fatigues Easliy in Short Distances    Other Referral/Resources/Interventions Provided:  Interventions: [facility]  Referral Comments: Referral placed to [facility]    Treatment Team Recommendation: Home with [facility]  Discharge Destination Plan[de-identified] Home with Juan C at Discharge : Family   IMM Given (Date):: 04/25/22  IMM Given to[de-identified] Patient

## 2022-04-26 ENCOUNTER — TRANSITIONAL CARE MANAGEMENT (OUTPATIENT)
Dept: FAMILY MEDICINE CLINIC | Facility: CLINIC | Age: 77
End: 2022-04-26

## 2022-04-27 ENCOUNTER — RA CDI HCC (OUTPATIENT)
Dept: OTHER | Facility: HOSPITAL | Age: 77
End: 2022-04-27

## 2022-04-27 NOTE — PROGRESS NOTES
[address]  coding opportunities     E11 22     Chart Reviewed number of suggestions sent to Provider: 1     Patients Insurance     Medicare Insurance: [address] [place] [address]

## 2022-04-29 ENCOUNTER — OFFICE VISIT (OUTPATIENT)
Dept: FAMILY MEDICINE CLINIC | Facility: CLINIC | Age: 77
End: 2022-04-29
Payer: COMMERCIAL

## 2022-04-29 ENCOUNTER — TELEPHONE (OUTPATIENT)
Dept: NEUROLOGY | Facility: CLINIC | Age: 77
End: 2022-04-29

## 2022-04-29 VITALS
HEART RATE: 78 BPM | DIASTOLIC BLOOD PRESSURE: 72 MMHG | HEIGHT: 60 IN | SYSTOLIC BLOOD PRESSURE: 118 MMHG | WEIGHT: 180 LBS | BODY MASS INDEX: 35.34 KG/M2 | TEMPERATURE: 98.4 F | OXYGEN SATURATION: 98 %

## 2022-04-29 DIAGNOSIS — E11.65 TYPE 2 DIABETES MELLITUS WITH HYPERGLYCEMIA, WITHOUT LONG-TERM CURRENT USE OF INSULIN (HCC): ICD-10-CM

## 2022-04-29 DIAGNOSIS — R29.90 STROKE-LIKE SYMPTOMS: Primary | ICD-10-CM

## 2022-04-29 DIAGNOSIS — R11.0 NAUSEA: ICD-10-CM

## 2022-04-29 DIAGNOSIS — I10 PRIMARY HYPERTENSION: ICD-10-CM

## 2022-04-29 PROCEDURE — 1111F DSCHRG MED/CURRENT MED MERGE: CPT | Performed by: NURSE PRACTITIONER

## 2022-04-29 PROCEDURE — 99495 TRANSJ CARE MGMT MOD F2F 14D: CPT | Performed by: NURSE PRACTITIONER

## 2022-04-29 RX ORDER — COVID-19 MOLECULAR TEST ASSAY
KIT MISCELLANEOUS
COMMUNITY
Start: 2022-04-02 | End: 2022-06-29 | Stop reason: ALTCHOICE

## 2022-04-29 NOTE — TELEPHONE ENCOUNTER
Patient called in to schedule HFU appt after getting a voicemail on her phone  SCHEDULED: Mon, 5/9/22 at 11:45am w/ Jordyn Alcazar for [place]\[facility] [place] [facility]\"" in [facility]

## 2022-04-29 NOTE — TELEPHONE ENCOUNTER
Stroke-like symptoms        [address] will need follow up in in 2 weeks with neurovascular AP or attending  She will not require outpatient neurological testing  Schedule follow up:  This should be completed prior to removing patient from list or discharge     1st attempt made to schd message left

## 2022-04-29 NOTE — PROGRESS NOTES
Assessment/Plan:     Diagnoses and all orders for this visit:    Stroke-like symptoms    Primary hypertension    Type 2 diabetes mellitus with hyperglycemia, without long-term current use of insulin (HCC)    Nausea    Other orders  -     BinaxNOW COVID-19 Ag Home Test KIT; USE  GUIDANCE ON PACKAGE  #1 Stroke-like symptoms    #2 Primary hypertension  Stable - discussed with patient plan to have her continue taking Metoprolol 12 5 mg daily along with Lisinopril 20 mg daily  #3 Type 2 diabetes mellitus with hyperglycemia  Discussed with patient plan to have her test blood glucose daily prior to adjusting medications  #4 Nausea  Discussed with patient plan to have her continues taking Zofran as needed for nausea  Discussed with patient plan to decrease atorvastatin to 20 mg until nausea and fatigue improves  Patient is due for her annual wellness visit for Medicare so will work out with daughter best time to schedule  Patient instructed to call office on Monday 05/02/2022 to report symptoms on half dose of statin  Patient instructed to call office with problems or concerns in the interim    Subjective:      Patient ID: Nancy Brown is a 76 y o  female  \[facility]\""     Chief complaint: 76 y  o female presenting for hospital follow up for stroke- like symptoms  HMI: Patient was admitted on 04/22/2022 to   [facility] for expressive aphasia and genralized fatigue  Patient discharged on 02/25/2022 with the diagnoses of stroke-like symptoms  CT head showed right basal ganglia low-attenuation age indeterminate ischemia, correlate with noncontrast brain MRI  CTA head and neck showed a moderate to severe not occlusive right M1 segment stenosis  : Echo LVEF 55%  Her blood pressure has been adjusted to goal SBP of 140-170  PT/OT recommended home with home health care  She will take aspirin and Plavix for 21 days then aspirin monotherapy afterward   Patient blood pressure stable but having some increased nausea and muscle aches so will decrease atorvastatin to 20 mg daily with plan to increase in future to 40 mg daily  Patient appears mildly depressed and withdrawn during appointment  Patient needs to make follow-up appointment with neurology as requested within 3 weeks  All medications and appointments reviewed with patient and her daughter  Family History   Problem Relation Age of Onset    Lung cancer Mother 55    Pulmonary embolism Father     Stroke Maternal Grandmother     Heart attack Maternal Grandfather     No Known Problems Paternal Grandmother     No Known Problems Paternal Grandfather     Diabetes Family         Diabetes mellitus    Hypertension Family     Stroke Family         Stroke complications    No Known Problems Daughter     No Known Problems Daughter     No Known Problems Sister     No Known Problems Maternal Aunt      Social History     Socioeconomic History    Marital status:       Spouse name: Not on file    Number of children: Not on file    Years of education: Not on file    Highest education level: Not on file   Occupational History    Occupation: Retired   Tobacco Use    Smoking status: Former Smoker     Types: Cigarettes     Quit date: 1970     Years since quittin 3    Smokeless tobacco: Never Used    Tobacco comment: Denied history of current ever day smoker, Former smoker and Never smoker all documented in [employer]   Vaping Use    Vaping Use: Never used   Substance and Sexual Activity    Alcohol use: Not Currently     Comment: Denied history of alcohol use    Drug use: No     Comment: Denied history of drug use    Sexual activity: Not Currently   Other Topics Concern    Not on file   Social History Narrative    Marital History - Currently  per [employer]     Social Determinants of Health     Financial Resource Strain: Not on file   Food Insecurity: No Food Insecurity    Worried About [address] in the Last Year: Never true    Ran Out of Food in the Last Year: Never true   Transportation Needs: No Transportation Needs    Lack of Transportation (Medical): No    Lack of Transportation (Non-Medical): No   Physical Activity: Not on file   Stress: Not on file   Social Connections: Not on file   Intimate Partner Violence: Not on file   Housing Stability: Low Risk     Unable to Pay for Housing in the Last Year: No    Number of Places Lived in the Last Year: 1    Unstable Housing in the Last Year: No     E-Cigarette/Vaping    E-Cigarette Use Never User      E-Cigarette/Vaping Substances    Nicotine No     THC No     CBD No     Flavoring No     Other No     Unknown No      Past Medical History:   Diagnosis Date    Acid reflux     Anemia     hx of iron-deficient    Anxiety     Arthritis     Asthma     last needed inhaler last year 2020    Basal cell carcinoma     upper lip    Chronic narcotic dependence (HCC)     Chronic pain     Colon polyp     Cystocele     Diabetes mellitus ([address] )     stable    Disease of thyroid gland     hypothyroidism    Diverticulosis     Dysfunctional uterine bleeding     last assessed - [id number]    Fibromyalgia     Gastric ulcer     Gastroparesis     History of colonic polyps     last assessed - [id number]    History of gastroesophageal reflux (GERD)     Hypercholesterolemia     Hyperlipidemia     Hypertension     IBS (irritable bowel syndrome)     Post laminectomy syndrome     Seasonal allergies     Spinal stenosis      Past Surgical History:   Procedure Laterality Date    APPENDECTOMY      BACK SURGERY      BREAST CYST ASPIRATION Left     pt unsure what exactly was removed    CHOLECYSTECTOMY      COLONOSCOPY      ESOPHAGOGASTRODUODENOSCOPY N/A 9/28/2016    Procedure: ESOPHAGOGASTRODUODENOSCOPY (EGD); Surgeon: Dimitry Wagoner MD;  [facility];   Service:     HERNIA REPAIR      HYSTERECTOMY      TTAH-BSO age 25    LAMINECTOMY      LUMBAR LAMINECTOMY      OOPHORECTOMY      age 25    NE ARTHRODESIS POSTERIOR/POSTEROLATERAL THORACIC N/A 6/4/2018    Procedure: Reopening of lumbar incision for T12-L5 posterior instrumented fixation and fusion and T12-L4 posterior decompression;  Surgeon: Stephon Freire MD;  [facility];  Service: Neurosurgery    NE COLONOSCOPY FLX DX W/COLLJ SPEC WHEN PFRMD N/A 3/2/2016    Procedure: EGD AND COLONOSCOPY;  Surgeon: Sotero Galan MD;  [facility]; Service: Gastroenterology    NE DILATE ESOPHAGUS N/A 9/28/2016    Procedure: DILATATION ESOPHAGEAL;  Surgeon: Sotero Galan MD;  [facility]; Service: Gastroenterology    NE ESOPHAGOGASTRODUODENOSCOPY TRANSORAL DIAGNOSTIC N/A 7/18/2016    Procedure: ESOPHAGOGASTRODUODENOSCOPY (EGD); Surgeon: Sotero Galan MD;  [facility];   Service: Gastroenterology    NE IMPLANT SPINAL NEUROSTIM/ Left 6/4/2018    Procedure: removal of left buttock implantable pulse generator and placement of new  implantable pulse generator;  Surgeon: Stephon Freire MD;  [facility];  Service: Neurosurgery     Allergies   Allergen Reactions    Penicillins Anaphylaxis, Hives and Other (See Comments)     Other reaction(s): Unknown Reaction    Sulfa Antibiotics Anaphylaxis and Other (See Comments)     Other reaction(s): Unknown Reaction    Aspartame - Food Allergy Other (See Comments) and Hypertension     Slurred speech, weakness, stroke sx    Iodinated Diagnostic Agents Hives     Pt has taken prep prior for contrast and has not had any break through reaction    Keflex [Cephalexin] Hives       Current Outpatient Medications:     albuterol (PROVENTIL HFA,VENTOLIN HFA) 90 mcg/act inhaler, Inhale 2 puffs every 6 (six) hours as needed for wheezing or shortness of breath, Disp: 1 Inhaler, Rfl: 5    ALPRAZolam (XANAX) 0 25 mg tablet, Take 1 tablet (0 25 mg total) by mouth 3 (three) times a day as needed for anxiety, Disp: 30 tablet, Rfl: 0    aspirin 81 mg chewable tablet, Chew 1 tablet (81 mg total) daily, Disp: 30 tablet, Rfl: 0    atorvastatin (LIPITOR) 40 mg tablet, Take 1 tablet (40 mg total) by mouth daily, Disp: 30 tablet, Rfl: 0    baclofen 10 mg tablet, TAKE ONE TABLET BY MOUTH THREE TIMES DAILY AS NEEDED FOR MUSCLE SPASM, Disp: 30 tablet, Rfl: 0    BinaxNOW COVID-19 Ag Home Test KIT, USE  GUIDANCE ON PACKAGE, Disp: , Rfl:     clopidogrel (PLAVIX) 75 mg tablet, Take 1 tablet (75 mg total) by mouth daily for 21 days, Disp: 21 tablet, Rfl: 0    dicyclomine (BENTYL) 20 mg tablet, Take 1 tablet (20 mg total) by mouth every 6 (six) hours as needed (bowel spasm), Disp: 120 tablet, Rfl: 1    estradiol (ESTRACE VAGINAL) 0 1 mg/g vaginal cream, Insert 1 g into the vagina 3 (three) times a week, Disp: 42 5 g, Rfl: 3    gabapentin (NEURONTIN) 300 mg capsule, TAKE ONE CAPSULE BY MOUTH DAILY IN MORNING AND TWO CAPSULES BY MOUTH AT BEDTIME, Disp: 270 capsule, Rfl: 0    glucose blood test strip, Bid testing, Disp: 100 each, Rfl: 6    latanoprost (XALATAN) 0 005 % ophthalmic solution, Administer 1 drop to both eyes daily at bedtime, Disp: 2 5 mL, Rfl: 3    levothyroxine 25 mcg tablet, Take 1 5 tablets (37 5 mcg total) by mouth daily, Disp: 45 tablet, Rfl: 3    lisinopril (ZESTRIL) 20 mg tablet, Take 1 tablet (20 mg total) by mouth daily, Disp: 30 tablet, Rfl: 3    meclizine (ANTIVERT) 25 mg tablet, Take 1 tablet (25 mg total) by mouth 4 (four) times a day as needed for dizziness, Disp: 60 tablet, Rfl: 3    metFORMIN (GLUCOPHAGE) 1000 MG tablet, Take 1 tablet (1,000 mg total) by mouth 2 (two) times a day with meals, Disp: 180 tablet, Rfl: 3    metoclopramide (REGLAN) 10 mg tablet, Take 1 tablet (10 mg total) by mouth 4 (four) times a day, Disp: 120 tablet, Rfl: 3    metoprolol tartrate (LOPRESSOR) 25 mg tablet, Take 0 5 tablets (12 5 mg total) by mouth every 12 (twelve) hours, Disp: 30 tablet, Rfl: 0    Milnacipran HCl (Savella) 100 MG TABS, Take 1 tablet (100 mg total) by mouth daily, Disp: 30 tablet, Rfl: 3    ondansetron (ZOFRAN) 8 mg tablet, Take 1 tablet (8 mg total) by mouth every 8 (eight) hours as needed for nausea or vomiting, Disp: 20 tablet, Rfl: 3    oxyCODONE-acetaminophen (PERCOCET)  mg per tablet, Take 1 tablet by mouth every 6 (six) hours as needed for severe pain Max Daily Amount: 4 tablets, Disp: 100 tablet, Rfl: 0    pantoprazole (PROTONIX) 40 mg tablet, Take 1 tablet (40 mg total) by mouth daily, Disp: 90 tablet, Rfl: 1    phenazopyridine (PYRIDIUM) 100 mg tablet, Take 1 tablet (100 mg total) by mouth 3 (three) times a day as needed for bladder spasms, Disp: 30 tablet, Rfl: 0    trospium chloride (SANCTURA) 20 mg tablet, Take 1 tablet (20 mg total) by mouth 2 (two) times a day, Disp: 90 tablet, Rfl: 3    zolpidem (AMBIEN) 5 mg tablet, Take 1 tablet (5 mg total) by mouth daily at bedtime as needed for sleep, Disp: 30 tablet, Rfl: 3    Review of Systems   Constitutional: Positive for fatigue  Negative for chills, diaphoresis and fever  HENT: Negative  Respiratory: Negative for cough, chest tightness, shortness of breath and wheezing  Cardiovascular: Negative for chest pain and palpitations  Gastrointestinal: Positive for nausea and vomiting  Negative for abdominal distention and abdominal pain  Musculoskeletal: Positive for back pain ( chronic), gait problem and myalgias  Neurological: Positive for headaches  Negative for dizziness and light-headedness  Psychiatric/Behavioral: Positive for dysphoric mood  Objective:    /72   Pulse 78   Temp 98 4 °F (36 9 °C)   Ht 5' (1 524 m)   Wt 81 6 kg (180 lb)   LMP  (LMP Unknown)   SpO2 98%   BMI 35 15 kg/m² (Reviewed)     Physical Exam  Vitals reviewed  Constitutional:       General: She is not in acute distress  Appearance: She is well-developed and well-groomed  She is not ill-appearing  HENT:      Head: Normocephalic and atraumatic     Eyes: General: Lids are normal       Extraocular Movements: Extraocular movements intact  Conjunctiva/sclera: Conjunctivae normal       Pupils: Pupils are equal, round, and reactive to light  Neck:      Vascular: Normal carotid pulses  No carotid bruit  Trachea: Trachea and phonation normal    Cardiovascular:      Rate and Rhythm: Normal rate and regular rhythm  Heart sounds: Normal heart sounds  Pulmonary:      Effort: Pulmonary effort is normal       Breath sounds: Normal breath sounds  Musculoskeletal:      Cervical back: Neck supple  Skin:     General: Skin is warm and dry  Capillary Refill: Capillary refill takes less than 2 seconds  Neurological:      General: No focal deficit present  Mental Status: She is alert and oriented to person, place, and time  Psychiatric:         Mood and Affect: Mood normal          Behavior: Behavior normal  Behavior is cooperative  Thought Content:  Thought content normal

## 2022-05-03 ENCOUNTER — TELEPHONE (OUTPATIENT)
Dept: FAMILY MEDICINE CLINIC | Facility: CLINIC | Age: 77
End: 2022-05-03

## 2022-05-03 NOTE — TELEPHONE ENCOUNTER
[facility] therapist called to let you know that he evaluated her for home care OT and he will be discharging her

## 2022-05-07 DIAGNOSIS — F32.9 REACTIVE DEPRESSION: Primary | ICD-10-CM

## 2022-05-07 RX ORDER — ESCITALOPRAM OXALATE 10 MG/1
10 TABLET ORAL DAILY
Qty: 30 TABLET | Refills: 5 | Status: SHIPPED | OUTPATIENT
Start: 2022-05-07 | End: 2022-06-29

## 2022-05-13 DIAGNOSIS — G62.9 NEUROPATHY: Primary | ICD-10-CM

## 2022-05-13 RX ORDER — GABAPENTIN 400 MG/1
400 CAPSULE ORAL
Qty: 30 CAPSULE | Refills: 3 | Status: SHIPPED | OUTPATIENT
Start: 2022-05-13 | End: 2022-06-29

## 2022-05-20 DIAGNOSIS — M96.1 POST LAMINECTOMY SYNDROME: ICD-10-CM

## 2022-05-20 DIAGNOSIS — M54.50 LUMBAR BACK PAIN: ICD-10-CM

## 2022-05-20 RX ORDER — OXYCODONE AND ACETAMINOPHEN 10; 325 MG/1; MG/1
1 TABLET ORAL EVERY 6 HOURS PRN
Qty: 100 TABLET | Refills: 0 | Status: ON HOLD | OUTPATIENT
Start: 2022-05-20 | End: 2022-06-21 | Stop reason: SDUPTHER

## 2022-05-23 DIAGNOSIS — I10 PRIMARY HYPERTENSION: ICD-10-CM

## 2022-05-23 DIAGNOSIS — J01.90 ACUTE NON-RECURRENT SINUSITIS, UNSPECIFIED LOCATION: Primary | ICD-10-CM

## 2022-05-23 RX ORDER — LEVOFLOXACIN 500 MG/1
500 TABLET, FILM COATED ORAL EVERY 24 HOURS
Qty: 7 TABLET | Refills: 0 | Status: SHIPPED | OUTPATIENT
Start: 2022-05-23 | End: 2022-05-30

## 2022-05-25 ENCOUNTER — HOSPITAL ENCOUNTER (OUTPATIENT)
Dept: RADIOLOGY | Age: 77
Discharge: HOME | End: 2022-05-25
Payer: COMMERCIAL

## 2022-05-25 VITALS — WEIGHT: 180 LBS | BODY MASS INDEX: 35.34 KG/M2 | HEIGHT: 60 IN

## 2022-05-25 DIAGNOSIS — Z12.31 ENCOUNTER FOR SCREENING MAMMOGRAM FOR MALIGNANT NEOPLASM OF BREAST: ICD-10-CM

## 2022-05-25 PROCEDURE — 77063 BREAST TOMOSYNTHESIS BI: CPT

## 2022-05-25 PROCEDURE — 77067 SCR MAMMO BI INCL CAD: CPT

## 2022-06-03 ENCOUNTER — TELEPHONE (OUTPATIENT)
Dept: FAMILY MEDICINE CLINIC | Facility: CLINIC | Age: 77
End: 2022-06-03

## 2022-06-03 DIAGNOSIS — E78.00 HIGH CHOLESTEROL: ICD-10-CM

## 2022-06-03 RX ORDER — ATORVASTATIN CALCIUM 40 MG/1
40 TABLET, FILM COATED ORAL DAILY
Qty: 90 TABLET | Refills: 1 | Status: SHIPPED | OUTPATIENT
Start: 2022-06-03 | End: 2022-11-22

## 2022-06-03 NOTE — TELEPHONE ENCOUNTER
Mendel called stating there are several drug warning interactions that she needs to inform you of:    Level II between Reglan and escitolopram    Duplicate warning between [address] and Bently    Duplicate warning between [facility] and escitolopram     Any questions, you can all Home Care at [phone]

## 2022-06-08 DIAGNOSIS — M54.50 LUMBAR BACK PAIN: ICD-10-CM

## 2022-06-08 DIAGNOSIS — F51.01 PRIMARY INSOMNIA: ICD-10-CM

## 2022-06-08 RX ORDER — ZOLPIDEM TARTRATE 5 MG/1
5 TABLET ORAL
Qty: 30 TABLET | Refills: 3 | Status: SHIPPED | OUTPATIENT
Start: 2022-06-08 | End: 2022-06-29

## 2022-06-08 RX ORDER — BACLOFEN 10 MG/1
TABLET ORAL
Qty: 90 TABLET | Refills: 4 | Status: SHIPPED | OUTPATIENT
Start: 2022-06-08 | End: 2022-08-09 | Stop reason: SDUPTHER

## 2022-06-09 ENCOUNTER — TELEPHONE (OUTPATIENT)
Dept: FAMILY MEDICINE CLINIC | Facility: CLINIC | Age: 77
End: 2022-06-09

## 2022-06-09 NOTE — TELEPHONE ENCOUNTER
Clarissa Ramírez NP called stating the patient  Peripheral Vascular screening shows severe on right leg and she can not get a palpate pulse right leg   She can be reached at [phone]

## 2022-06-10 NOTE — TELEPHONE ENCOUNTER
I attempt twice to contact the person but unable to contact   I will further investigate patient's vascular status and create orders as needed

## 2022-06-16 DIAGNOSIS — K59.1 FUNCTIONAL DIARRHEA: Primary | ICD-10-CM

## 2022-06-16 RX ORDER — CHOLESTYRAMINE 4 G/9G
1 POWDER, FOR SUSPENSION ORAL 2 TIMES DAILY WITH MEALS
Qty: 10 PACKET | Refills: 0 | Status: SHIPPED | OUTPATIENT
Start: 2022-06-16 | End: 2022-08-09 | Stop reason: ALTCHOICE

## 2022-06-18 ENCOUNTER — APPOINTMENT (EMERGENCY)
Dept: RADIOLOGY | Facility: HOSPITAL | Age: 77
DRG: 872 | End: 2022-06-18
Payer: COMMERCIAL

## 2022-06-18 ENCOUNTER — APPOINTMENT (EMERGENCY)
Dept: CT IMAGING | Facility: HOSPITAL | Age: 77
DRG: 872 | End: 2022-06-18
Payer: COMMERCIAL

## 2022-06-18 ENCOUNTER — HOSPITAL ENCOUNTER (INPATIENT)
Facility: HOSPITAL | Age: 77
LOS: 3 days | Discharge: HOME | DRG: 872 | End: 2022-06-21
Attending: EMERGENCY MEDICINE | Admitting: INTERNAL MEDICINE
Payer: COMMERCIAL

## 2022-06-18 ENCOUNTER — APPOINTMENT (INPATIENT)
Dept: ULTRASOUND IMAGING | Facility: HOSPITAL | Age: 77
DRG: 872 | End: 2022-06-18
Payer: COMMERCIAL

## 2022-06-18 DIAGNOSIS — D64.9 ANEMIA: ICD-10-CM

## 2022-06-18 DIAGNOSIS — E66.01 OBESITY, MORBID (HCC): ICD-10-CM

## 2022-06-18 DIAGNOSIS — R79.89 ELEVATED SERUM CREATININE: ICD-10-CM

## 2022-06-18 DIAGNOSIS — R11.2 NAUSEA VOMITING AND DIARRHEA: ICD-10-CM

## 2022-06-18 DIAGNOSIS — A41.9 SEVERE SEPSIS (HCC): Primary | ICD-10-CM

## 2022-06-18 DIAGNOSIS — R19.7 NAUSEA VOMITING AND DIARRHEA: ICD-10-CM

## 2022-06-18 DIAGNOSIS — M96.1 POST LAMINECTOMY SYNDROME: ICD-10-CM

## 2022-06-18 DIAGNOSIS — M54.50 LUMBAR BACK PAIN: ICD-10-CM

## 2022-06-18 DIAGNOSIS — R42 VERTIGO: ICD-10-CM

## 2022-06-18 DIAGNOSIS — I63.9 STROKE (HCC): ICD-10-CM

## 2022-06-18 DIAGNOSIS — I10 ESSENTIAL HYPERTENSION: ICD-10-CM

## 2022-06-18 DIAGNOSIS — K83.8 PNEUMOBILIA: ICD-10-CM

## 2022-06-18 DIAGNOSIS — R10.9 ABDOMINAL PAIN: ICD-10-CM

## 2022-06-18 DIAGNOSIS — E03.9 HYPOTHYROIDISM, UNSPECIFIED TYPE: ICD-10-CM

## 2022-06-18 DIAGNOSIS — E83.42 HYPOMAGNESEMIA: ICD-10-CM

## 2022-06-18 DIAGNOSIS — R19.7 DIARRHEA, UNSPECIFIED TYPE: ICD-10-CM

## 2022-06-18 DIAGNOSIS — R65.20 SEVERE SEPSIS (HCC): Primary | ICD-10-CM

## 2022-06-18 DIAGNOSIS — E86.0 DEHYDRATION: ICD-10-CM

## 2022-06-18 DIAGNOSIS — A41.9 SEPSIS (HCC): ICD-10-CM

## 2022-06-18 PROBLEM — R94.31 PROLONGED Q-T INTERVAL ON ECG: Status: ACTIVE | Noted: 2022-06-18

## 2022-06-18 PROBLEM — N17.9 ACUTE KIDNEY INJURY (HCC): Status: ACTIVE | Noted: 2022-06-18

## 2022-06-18 PROBLEM — N18.9 ACUTE-ON-CHRONIC KIDNEY INJURY (HCC): Status: ACTIVE | Noted: 2022-06-18

## 2022-06-18 LAB
ALBUMIN SERPL BCP-MCNC: 1.8 G/DL (ref 3.5–5)
ALBUMIN SERPL BCP-MCNC: 3.4 G/DL (ref 3.5–5)
ALP SERPL-CCNC: 139 U/L (ref 34–104)
ALP SERPL-CCNC: 67 U/L (ref 34–104)
ALT SERPL W P-5'-P-CCNC: 5 U/L (ref 7–52)
ALT SERPL W P-5'-P-CCNC: 8 U/L (ref 7–52)
ANION GAP SERPL CALCULATED.3IONS-SCNC: 10 MMOL/L (ref 4–13)
ANION GAP SERPL CALCULATED.3IONS-SCNC: 8 MMOL/L (ref 4–13)
APTT PPP: 24 SECONDS (ref 23–37)
AST SERPL W P-5'-P-CCNC: 12 U/L (ref 13–39)
AST SERPL W P-5'-P-CCNC: 17 U/L (ref 13–39)
ATRIAL RATE: 122 BPM
BACTERIA UR QL AUTO: ABNORMAL /HPF
BASOPHILS # BLD AUTO: 0.06 THOUSANDS/ΜL (ref 0–0.1)
BASOPHILS NFR BLD AUTO: 0 % (ref 0–1)
BILIRUB SERPL-MCNC: 0.19 MG/DL (ref 0.2–1)
BILIRUB SERPL-MCNC: 0.29 MG/DL (ref 0.2–1)
BILIRUB UR QL STRIP: NEGATIVE
BUN SERPL-MCNC: 12 MG/DL (ref 5–25)
BUN SERPL-MCNC: 18 MG/DL (ref 5–25)
CA-I BLD-SCNC: 1.25 MMOL/L (ref 1.12–1.32)
CALCIUM ALBUM COR SERPL-MCNC: 6.6 MG/DL (ref 8.3–10.1)
CALCIUM ALBUM COR SERPL-MCNC: 9.2 MG/DL (ref 8.3–10.1)
CALCIUM SERPL-MCNC: 4.8 MG/DL (ref 8.4–10.2)
CALCIUM SERPL-MCNC: 8.7 MG/DL (ref 8.4–10.2)
CHLORIDE SERPL-SCNC: 104 MMOL/L (ref 96–108)
CHLORIDE SERPL-SCNC: 117 MMOL/L (ref 96–108)
CLARITY UR: ABNORMAL
CO2 SERPL-SCNC: 15 MMOL/L (ref 21–32)
CO2 SERPL-SCNC: 23 MMOL/L (ref 21–32)
COLOR UR: YELLOW
CREAT SERPL-MCNC: 0.92 MG/DL (ref 0.6–1.3)
CREAT SERPL-MCNC: 1.36 MG/DL (ref 0.6–1.3)
EOSINOPHIL # BLD AUTO: 1.15 THOUSAND/ΜL (ref 0–0.61)
EOSINOPHIL NFR BLD AUTO: 7 % (ref 0–6)
ERYTHROCYTE [DISTWIDTH] IN BLOOD BY AUTOMATED COUNT: 14.8 % (ref 11.6–15.1)
GFR SERPL CREATININE-BSD FRML MDRD: 37 ML/MIN/1.73SQ M
GFR SERPL CREATININE-BSD FRML MDRD: 59 ML/MIN/1.73SQ M
GLUCOSE SERPL-MCNC: 140 MG/DL (ref 65–140)
GLUCOSE SERPL-MCNC: 142 MG/DL (ref 65–140)
GLUCOSE SERPL-MCNC: 179 MG/DL (ref 65–140)
GLUCOSE SERPL-MCNC: 225 MG/DL (ref 65–140)
GLUCOSE SERPL-MCNC: 99 MG/DL (ref 65–140)
GLUCOSE UR STRIP-MCNC: NEGATIVE MG/DL
HCT VFR BLD AUTO: 44.3 % (ref 34.8–46.1)
HGB BLD-MCNC: 13.7 G/DL (ref 11.5–15.4)
HGB UR QL STRIP.AUTO: ABNORMAL
IMM GRANULOCYTES # BLD AUTO: 0.16 THOUSAND/UL (ref 0–0.2)
IMM GRANULOCYTES NFR BLD AUTO: 1 % (ref 0–2)
INR PPP: 1.25 (ref 0.84–1.19)
KETONES UR STRIP-MCNC: NEGATIVE MG/DL
LACTATE SERPL-SCNC: 1.5 MMOL/L (ref 0.5–2)
LACTATE SERPL-SCNC: 2.9 MMOL/L (ref 0.5–2)
LEUKOCYTE ESTERASE UR QL STRIP: ABNORMAL
LIPASE SERPL-CCNC: 16 U/L (ref 11–82)
LYMPHOCYTES # BLD AUTO: 1.54 THOUSANDS/ΜL (ref 0.6–4.47)
LYMPHOCYTES NFR BLD AUTO: 9 % (ref 14–44)
MCH RBC QN AUTO: 26.8 PG (ref 26.8–34.3)
MCHC RBC AUTO-ENTMCNC: 30.9 G/DL (ref 31.4–37.4)
MCV RBC AUTO: 87 FL (ref 82–98)
MONOCYTES # BLD AUTO: 1.12 THOUSAND/ΜL (ref 0.17–1.22)
MONOCYTES NFR BLD AUTO: 7 % (ref 4–12)
NEUTROPHILS # BLD AUTO: 12.87 THOUSANDS/ΜL (ref 1.85–7.62)
NEUTS SEG NFR BLD AUTO: 76 % (ref 43–75)
NITRITE UR QL STRIP: NEGATIVE
NON-SQ EPI CELLS URNS QL MICRO: ABNORMAL /HPF
NRBC BLD AUTO-RTO: 0 /100 WBCS
P AXIS: 41 DEGREES
PH UR STRIP.AUTO: 6 [PH]
PLATELET # BLD AUTO: 342 THOUSANDS/UL (ref 149–390)
PMV BLD AUTO: 11.2 FL (ref 8.9–12.7)
POTASSIUM SERPL-SCNC: 2.5 MMOL/L (ref 3.5–5.3)
POTASSIUM SERPL-SCNC: 3.8 MMOL/L (ref 3.5–5.3)
PR INTERVAL: 130 MS
PROCALCITONIN SERPL-MCNC: 0.07 NG/ML
PROT SERPL-MCNC: 3.3 G/DL (ref 6.4–8.4)
PROT SERPL-MCNC: 6.2 G/DL (ref 6.4–8.4)
PROT UR STRIP-MCNC: ABNORMAL MG/DL
PROTHROMBIN TIME: 15.6 SECONDS (ref 11.6–14.5)
QRS AXIS: 240 DEGREES
QRSD INTERVAL: 126 MS
QT INTERVAL: 366 MS
QTC INTERVAL: 511 MS
RBC # BLD AUTO: 5.12 MILLION/UL (ref 3.81–5.12)
RBC #/AREA URNS AUTO: ABNORMAL /HPF
SODIUM SERPL-SCNC: 137 MMOL/L (ref 135–147)
SODIUM SERPL-SCNC: 140 MMOL/L (ref 135–147)
SP GR UR STRIP.AUTO: >=1.03 (ref 1–1.03)
T WAVE AXIS: 52 DEGREES
UROBILINOGEN UR QL STRIP.AUTO: 0.2 E.U./DL
VENTRICULAR RATE: 117 BPM
WBC # BLD AUTO: 16.9 THOUSAND/UL (ref 4.31–10.16)
WBC #/AREA URNS AUTO: ABNORMAL /HPF

## 2022-06-18 PROCEDURE — 82977 ASSAY OF GGT: CPT | Performed by: NURSE PRACTITIONER

## 2022-06-18 PROCEDURE — 96367 TX/PROPH/DG ADDL SEQ IV INF: CPT

## 2022-06-18 PROCEDURE — 99222 1ST HOSP IP/OBS MODERATE 55: CPT | Performed by: SURGERY

## 2022-06-18 PROCEDURE — 85610 PROTHROMBIN TIME: CPT | Performed by: EMERGENCY MEDICINE

## 2022-06-18 PROCEDURE — 85025 COMPLETE CBC W/AUTO DIFF WBC: CPT | Performed by: INTERNAL MEDICINE

## 2022-06-18 PROCEDURE — 82330 ASSAY OF CALCIUM: CPT | Performed by: INTERNAL MEDICINE

## 2022-06-18 PROCEDURE — 99223 1ST HOSP IP/OBS HIGH 75: CPT | Performed by: INTERNAL MEDICINE

## 2022-06-18 PROCEDURE — 82948 REAGENT STRIP/BLOOD GLUCOSE: CPT

## 2022-06-18 PROCEDURE — 85025 COMPLETE CBC W/AUTO DIFF WBC: CPT | Performed by: EMERGENCY MEDICINE

## 2022-06-18 PROCEDURE — 96375 TX/PRO/DX INJ NEW DRUG ADDON: CPT

## 2022-06-18 PROCEDURE — G1004 CDSM NDSC: HCPCS

## 2022-06-18 PROCEDURE — 83605 ASSAY OF LACTIC ACID: CPT | Performed by: EMERGENCY MEDICINE

## 2022-06-18 PROCEDURE — 84132 ASSAY OF SERUM POTASSIUM: CPT | Performed by: INTERNAL MEDICINE

## 2022-06-18 PROCEDURE — 71045 X-RAY EXAM CHEST 1 VIEW: CPT

## 2022-06-18 PROCEDURE — 96365 THER/PROPH/DIAG IV INF INIT: CPT

## 2022-06-18 PROCEDURE — 83735 ASSAY OF MAGNESIUM: CPT | Performed by: INTERNAL MEDICINE

## 2022-06-18 PROCEDURE — 84145 PROCALCITONIN (PCT): CPT | Performed by: INTERNAL MEDICINE

## 2022-06-18 PROCEDURE — 99285 EMERGENCY DEPT VISIT HI MDM: CPT | Performed by: EMERGENCY MEDICINE

## 2022-06-18 PROCEDURE — 96361 HYDRATE IV INFUSION ADD-ON: CPT

## 2022-06-18 PROCEDURE — 93005 ELECTROCARDIOGRAM TRACING: CPT

## 2022-06-18 PROCEDURE — 99285 EMERGENCY DEPT VISIT HI MDM: CPT

## 2022-06-18 PROCEDURE — 93010 ELECTROCARDIOGRAM REPORT: CPT | Performed by: INTERNAL MEDICINE

## 2022-06-18 PROCEDURE — 87040 BLOOD CULTURE FOR BACTERIA: CPT | Performed by: EMERGENCY MEDICINE

## 2022-06-18 PROCEDURE — 36415 COLL VENOUS BLD VENIPUNCTURE: CPT | Performed by: EMERGENCY MEDICINE

## 2022-06-18 PROCEDURE — 80048 BASIC METABOLIC PNL TOTAL CA: CPT | Performed by: INTERNAL MEDICINE

## 2022-06-18 PROCEDURE — 83690 ASSAY OF LIPASE: CPT | Performed by: EMERGENCY MEDICINE

## 2022-06-18 PROCEDURE — 76705 ECHO EXAM OF ABDOMEN: CPT

## 2022-06-18 PROCEDURE — 87493 C DIFF AMPLIFIED PROBE: CPT | Performed by: EMERGENCY MEDICINE

## 2022-06-18 PROCEDURE — 74176 CT ABD & PELVIS W/O CONTRAST: CPT

## 2022-06-18 PROCEDURE — 85730 THROMBOPLASTIN TIME PARTIAL: CPT | Performed by: EMERGENCY MEDICINE

## 2022-06-18 PROCEDURE — 80053 COMPREHEN METABOLIC PANEL: CPT | Performed by: EMERGENCY MEDICINE

## 2022-06-18 PROCEDURE — 81001 URINALYSIS AUTO W/SCOPE: CPT | Performed by: EMERGENCY MEDICINE

## 2022-06-18 PROCEDURE — 80053 COMPREHEN METABOLIC PANEL: CPT | Performed by: INTERNAL MEDICINE

## 2022-06-18 RX ORDER — FAMOTIDINE 10 MG/ML
20 INJECTION, SOLUTION INTRAVENOUS ONCE
Status: COMPLETED | OUTPATIENT
Start: 2022-06-18 | End: 2022-06-18

## 2022-06-18 RX ORDER — METRONIDAZOLE 500 MG/100ML
500 INJECTION, SOLUTION INTRAVENOUS EVERY 8 HOURS
Status: DISCONTINUED | OUTPATIENT
Start: 2022-06-18 | End: 2022-06-21 | Stop reason: HOSPADM

## 2022-06-18 RX ORDER — PANTOPRAZOLE SODIUM 40 MG/1
40 TABLET, DELAYED RELEASE ORAL DAILY
Status: DISCONTINUED | OUTPATIENT
Start: 2022-06-18 | End: 2022-06-21 | Stop reason: HOSPADM

## 2022-06-18 RX ORDER — ZOLPIDEM TARTRATE 5 MG/1
5 TABLET ORAL
Status: DISCONTINUED | OUTPATIENT
Start: 2022-06-18 | End: 2022-06-21 | Stop reason: HOSPADM

## 2022-06-18 RX ORDER — NYSTATIN 100000 [USP'U]/G
POWDER TOPICAL 2 TIMES DAILY
Status: DISCONTINUED | OUTPATIENT
Start: 2022-06-18 | End: 2022-06-21 | Stop reason: HOSPADM

## 2022-06-18 RX ORDER — OXYCODONE HYDROCHLORIDE AND ACETAMINOPHEN 5; 325 MG/1; MG/1
1 TABLET ORAL EVERY 6 HOURS PRN
Status: DISCONTINUED | OUTPATIENT
Start: 2022-06-18 | End: 2022-06-21 | Stop reason: HOSPADM

## 2022-06-18 RX ORDER — PHENAZOPYRIDINE HYDROCHLORIDE 100 MG/1
100 TABLET, FILM COATED ORAL 3 TIMES DAILY PRN
Status: DISCONTINUED | OUTPATIENT
Start: 2022-06-18 | End: 2022-06-18

## 2022-06-18 RX ORDER — CALCIUM GLUCONATE 20 MG/ML
1 INJECTION, SOLUTION INTRAVENOUS ONCE
Status: COMPLETED | OUTPATIENT
Start: 2022-06-18 | End: 2022-06-18

## 2022-06-18 RX ORDER — ALPRAZOLAM 0.25 MG/1
0.25 TABLET ORAL 3 TIMES DAILY PRN
Status: DISCONTINUED | OUTPATIENT
Start: 2022-06-18 | End: 2022-06-21 | Stop reason: HOSPADM

## 2022-06-18 RX ORDER — OXYBUTYNIN CHLORIDE 5 MG/1
5 TABLET ORAL 2 TIMES DAILY
Status: DISCONTINUED | OUTPATIENT
Start: 2022-06-18 | End: 2022-06-21 | Stop reason: HOSPADM

## 2022-06-18 RX ORDER — PROMETHAZINE HYDROCHLORIDE 25 MG/ML
12.5 INJECTION, SOLUTION INTRAMUSCULAR; INTRAVENOUS ONCE
Status: COMPLETED | OUTPATIENT
Start: 2022-06-18 | End: 2022-06-18

## 2022-06-18 RX ORDER — SODIUM CHLORIDE 9 MG/ML
100 INJECTION, SOLUTION INTRAVENOUS CONTINUOUS
Status: DISCONTINUED | OUTPATIENT
Start: 2022-06-18 | End: 2022-06-20

## 2022-06-18 RX ORDER — ASPIRIN 81 MG/1
81 TABLET, CHEWABLE ORAL DAILY
Status: DISCONTINUED | OUTPATIENT
Start: 2022-06-18 | End: 2022-06-21 | Stop reason: HOSPADM

## 2022-06-18 RX ORDER — ONDANSETRON 2 MG/ML
4 INJECTION INTRAMUSCULAR; INTRAVENOUS EVERY 6 HOURS PRN
Status: CANCELLED | OUTPATIENT
Start: 2022-06-18

## 2022-06-18 RX ORDER — LEVOTHYROXINE SODIUM 0.07 MG/1
37.5 TABLET ORAL
Status: DISCONTINUED | OUTPATIENT
Start: 2022-06-18 | End: 2022-06-21 | Stop reason: HOSPADM

## 2022-06-18 RX ORDER — MECLIZINE HYDROCHLORIDE 25 MG/1
25 TABLET ORAL 4 TIMES DAILY PRN
Status: DISCONTINUED | OUTPATIENT
Start: 2022-06-18 | End: 2022-06-21 | Stop reason: HOSPADM

## 2022-06-18 RX ORDER — LATANOPROST 50 UG/ML
1 SOLUTION/ DROPS OPHTHALMIC
Status: DISCONTINUED | OUTPATIENT
Start: 2022-06-18 | End: 2022-06-21 | Stop reason: HOSPADM

## 2022-06-18 RX ORDER — HEPARIN SODIUM 5000 [USP'U]/ML
5000 INJECTION, SOLUTION INTRAVENOUS; SUBCUTANEOUS EVERY 8 HOURS SCHEDULED
Status: DISCONTINUED | OUTPATIENT
Start: 2022-06-18 | End: 2022-06-21 | Stop reason: HOSPADM

## 2022-06-18 RX ORDER — ACETAMINOPHEN 325 MG/1
650 TABLET ORAL EVERY 6 HOURS PRN
Status: DISCONTINUED | OUTPATIENT
Start: 2022-06-18 | End: 2022-06-21 | Stop reason: HOSPADM

## 2022-06-18 RX ORDER — HYDRALAZINE HYDROCHLORIDE 20 MG/ML
10 INJECTION INTRAMUSCULAR; INTRAVENOUS EVERY 6 HOURS PRN
Status: DISCONTINUED | OUTPATIENT
Start: 2022-06-18 | End: 2022-06-21 | Stop reason: HOSPADM

## 2022-06-18 RX ORDER — HYDROMORPHONE HCL/PF 1 MG/ML
0.5 SYRINGE (ML) INJECTION ONCE
Status: COMPLETED | OUTPATIENT
Start: 2022-06-18 | End: 2022-06-18

## 2022-06-18 RX ORDER — DIPHENHYDRAMINE HYDROCHLORIDE 50 MG/ML
25 INJECTION INTRAMUSCULAR; INTRAVENOUS ONCE
Status: COMPLETED | OUTPATIENT
Start: 2022-06-18 | End: 2022-06-18

## 2022-06-18 RX ORDER — SODIUM CHLORIDE 9 MG/ML
125 INJECTION, SOLUTION INTRAVENOUS CONTINUOUS
Status: DISCONTINUED | OUTPATIENT
Start: 2022-06-18 | End: 2022-06-19

## 2022-06-18 RX ORDER — METRONIDAZOLE 500 MG/100ML
500 INJECTION, SOLUTION INTRAVENOUS ONCE
Status: COMPLETED | OUTPATIENT
Start: 2022-06-18 | End: 2022-06-18

## 2022-06-18 RX ORDER — POTASSIUM CHLORIDE 14.9 MG/ML
20 INJECTION INTRAVENOUS EVERY 4 HOURS
Status: COMPLETED | OUTPATIENT
Start: 2022-06-18 | End: 2022-06-19

## 2022-06-18 RX ORDER — BACLOFEN 10 MG/1
10 TABLET ORAL 3 TIMES DAILY PRN
Status: DISCONTINUED | OUTPATIENT
Start: 2022-06-18 | End: 2022-06-18

## 2022-06-18 RX ORDER — CALCIUM GLUCONATE 20 MG/ML
2 INJECTION, SOLUTION INTRAVENOUS ONCE
Status: COMPLETED | OUTPATIENT
Start: 2022-06-18 | End: 2022-06-18

## 2022-06-18 RX ORDER — ALBUTEROL SULFATE 90 UG/1
2 AEROSOL, METERED RESPIRATORY (INHALATION) EVERY 6 HOURS PRN
Status: DISCONTINUED | OUTPATIENT
Start: 2022-06-18 | End: 2022-06-21 | Stop reason: HOSPADM

## 2022-06-18 RX ORDER — GABAPENTIN 400 MG/1
400 CAPSULE ORAL
Status: DISCONTINUED | OUTPATIENT
Start: 2022-06-18 | End: 2022-06-21 | Stop reason: HOSPADM

## 2022-06-18 RX ORDER — INSULIN LISPRO 100 [IU]/ML
1-6 INJECTION, SOLUTION INTRAVENOUS; SUBCUTANEOUS
Status: DISCONTINUED | OUTPATIENT
Start: 2022-06-18 | End: 2022-06-21 | Stop reason: HOSPADM

## 2022-06-18 RX ORDER — LEVOFLOXACIN 5 MG/ML
750 INJECTION, SOLUTION INTRAVENOUS ONCE
Status: COMPLETED | OUTPATIENT
Start: 2022-06-18 | End: 2022-06-18

## 2022-06-18 RX ORDER — ESCITALOPRAM OXALATE 10 MG/1
10 TABLET ORAL DAILY
Status: DISCONTINUED | OUTPATIENT
Start: 2022-06-18 | End: 2022-06-21 | Stop reason: HOSPADM

## 2022-06-18 RX ORDER — ONDANSETRON 2 MG/ML
4 INJECTION INTRAMUSCULAR; INTRAVENOUS ONCE
Status: COMPLETED | OUTPATIENT
Start: 2022-06-18 | End: 2022-06-18

## 2022-06-18 RX ORDER — POTASSIUM CHLORIDE 20 MEQ/1
40 TABLET, EXTENDED RELEASE ORAL EVERY 4 HOURS
Status: COMPLETED | OUTPATIENT
Start: 2022-06-18 | End: 2022-06-19

## 2022-06-18 RX ORDER — ONDANSETRON 2 MG/ML
1 INJECTION INTRAMUSCULAR; INTRAVENOUS ONCE
Status: COMPLETED | OUTPATIENT
Start: 2022-06-18 | End: 2022-06-18

## 2022-06-18 RX ORDER — ATORVASTATIN CALCIUM 40 MG/1
40 TABLET, FILM COATED ORAL DAILY
Status: DISCONTINUED | OUTPATIENT
Start: 2022-06-18 | End: 2022-06-21 | Stop reason: HOSPADM

## 2022-06-18 RX ADMIN — NYSTATIN: 100000 POWDER TOPICAL at 18:10

## 2022-06-18 RX ADMIN — OXYCODONE HYDROCHLORIDE AND ACETAMINOPHEN 1 TABLET: 5; 325 TABLET ORAL at 05:20

## 2022-06-18 RX ADMIN — TRIMETHOBENZAMIDE HYDROCHLORIDE 200 MG: 100 INJECTION INTRAMUSCULAR at 05:05

## 2022-06-18 RX ADMIN — GABAPENTIN 400 MG: 400 CAPSULE ORAL at 21:06

## 2022-06-18 RX ADMIN — POTASSIUM CHLORIDE 40 MEQ: 1500 TABLET, EXTENDED RELEASE ORAL at 23:50

## 2022-06-18 RX ADMIN — ATORVASTATIN CALCIUM 40 MG: 40 TABLET, FILM COATED ORAL at 08:59

## 2022-06-18 RX ADMIN — POTASSIUM CHLORIDE 40 MEQ: 1500 TABLET, EXTENDED RELEASE ORAL at 19:22

## 2022-06-18 RX ADMIN — LATANOPROST 1 DROP: 50 SOLUTION OPHTHALMIC at 23:46

## 2022-06-18 RX ADMIN — HEPARIN SODIUM 5000 UNITS: 5000 INJECTION INTRAVENOUS; SUBCUTANEOUS at 14:17

## 2022-06-18 RX ADMIN — HYDROMORPHONE HYDROCHLORIDE 0.5 MG: 1 INJECTION, SOLUTION INTRAMUSCULAR; INTRAVENOUS; SUBCUTANEOUS at 00:46

## 2022-06-18 RX ADMIN — DIPHENHYDRAMINE HYDROCHLORIDE 25 MG: 50 INJECTION, SOLUTION INTRAMUSCULAR; INTRAVENOUS at 03:03

## 2022-06-18 RX ADMIN — METOPROLOL TARTRATE 12.5 MG: 25 TABLET, FILM COATED ORAL at 21:04

## 2022-06-18 RX ADMIN — SODIUM CHLORIDE 125 ML/HR: 0.9 INJECTION, SOLUTION INTRAVENOUS at 03:04

## 2022-06-18 RX ADMIN — INSULIN LISPRO 1 UNITS: 100 INJECTION, SOLUTION INTRAVENOUS; SUBCUTANEOUS at 09:05

## 2022-06-18 RX ADMIN — POTASSIUM CHLORIDE 20 MEQ: 14.9 INJECTION, SOLUTION INTRAVENOUS at 23:50

## 2022-06-18 RX ADMIN — ESCITALOPRAM OXALATE 10 MG: 10 TABLET ORAL at 09:03

## 2022-06-18 RX ADMIN — LEVOFLOXACIN 750 MG: 5 INJECTION, SOLUTION INTRAVENOUS at 02:07

## 2022-06-18 RX ADMIN — OXYCODONE HYDROCHLORIDE AND ACETAMINOPHEN 1 TABLET: 5; 325 TABLET ORAL at 16:37

## 2022-06-18 RX ADMIN — OXYBUTYNIN CHLORIDE 5 MG: 5 TABLET ORAL at 18:09

## 2022-06-18 RX ADMIN — LEVOTHYROXINE SODIUM 37.5 MCG: 75 TABLET ORAL at 05:05

## 2022-06-18 RX ADMIN — SODIUM CHLORIDE 500 ML: 0.9 INJECTION, SOLUTION INTRAVENOUS at 00:46

## 2022-06-18 RX ADMIN — HEPARIN SODIUM 5000 UNITS: 5000 INJECTION INTRAVENOUS; SUBCUTANEOUS at 05:05

## 2022-06-18 RX ADMIN — SODIUM CHLORIDE 100 ML/HR: 0.9 INJECTION, SOLUTION INTRAVENOUS at 05:04

## 2022-06-18 RX ADMIN — NYSTATIN: 100000 POWDER TOPICAL at 12:40

## 2022-06-18 RX ADMIN — CALCIUM GLUCONATE 1 G: 20 INJECTION, SOLUTION INTRAVENOUS at 21:59

## 2022-06-18 RX ADMIN — ONDANSETRON 4 MG: 2 INJECTION INTRAMUSCULAR; INTRAVENOUS at 00:46

## 2022-06-18 RX ADMIN — CEFTRIAXONE 1000 MG: 1 INJECTION, POWDER, FOR SOLUTION INTRAMUSCULAR; INTRAVENOUS at 05:05

## 2022-06-18 RX ADMIN — METOPROLOL TARTRATE 12.5 MG: 25 TABLET, FILM COATED ORAL at 09:01

## 2022-06-18 RX ADMIN — FAMOTIDINE 20 MG: 10 INJECTION, SOLUTION INTRAVENOUS at 00:46

## 2022-06-18 RX ADMIN — POTASSIUM CHLORIDE 20 MEQ: 14.9 INJECTION, SOLUTION INTRAVENOUS at 19:23

## 2022-06-18 RX ADMIN — PROMETHAZINE HYDROCHLORIDE 12.5 MG: 25 INJECTION INTRAMUSCULAR; INTRAVENOUS at 02:02

## 2022-06-18 RX ADMIN — METRONIDAZOLE 500 MG: 500 INJECTION, SOLUTION INTRAVENOUS at 01:40

## 2022-06-18 RX ADMIN — PANTOPRAZOLE SODIUM 40 MG: 40 TABLET, DELAYED RELEASE ORAL at 08:59

## 2022-06-18 RX ADMIN — SODIUM CHLORIDE 500 ML: 0.9 INJECTION, SOLUTION INTRAVENOUS at 01:40

## 2022-06-18 RX ADMIN — HEPARIN SODIUM 5000 UNITS: 5000 INJECTION INTRAVENOUS; SUBCUTANEOUS at 21:06

## 2022-06-18 RX ADMIN — METRONIDAZOLE 500 MG: 500 INJECTION, SOLUTION INTRAVENOUS at 09:00

## 2022-06-18 RX ADMIN — CALCIUM GLUCONATE 2 G: 20 INJECTION, SOLUTION INTRAVENOUS at 20:54

## 2022-06-18 RX ADMIN — METRONIDAZOLE 500 MG: 500 INJECTION, SOLUTION INTRAVENOUS at 16:38

## 2022-06-18 RX ADMIN — OXYBUTYNIN CHLORIDE 5 MG: 5 TABLET ORAL at 09:00

## 2022-06-18 RX ADMIN — ASPIRIN 81 MG CHEWABLE TABLET 81 MG: 81 TABLET CHEWABLE at 08:59

## 2022-06-18 NOTE — PLAN OF CARE
Problem: MOBILITY - ADULT  Goal: Maintain or return to baseline ADL function  Description: INTERVENTIONS:  -  Assess patient's ability to carry out ADLs; assess patient's baseline for ADL function and identify physical deficits which impact ability to perform ADLs (bathing, care of mouth/teeth, toileting, grooming, dressing, etc )  - Assess/evaluate cause of self-care deficits   - Assess range of motion  - Assess patient's mobility; develop plan if impaired  - Assess patient's need for assistive devices and provide as appropriate  - Encourage maximum independence but intervene and supervise when necessary  - Involve family in performance of ADLs  - Assess for home care needs following discharge   - Consider OT consult to assist with ADL evaluation and planning for discharge  - Provide patient education as appropriate  Outcome: Progressing  Goal: Maintains/Returns to pre admission functional level  Description: INTERVENTIONS:  - Perform BMAT or MOVE assessment daily    - Set and communicate daily mobility goal to care team and patient/family/caregiver  - Collaborate with rehabilitation services on mobility goals if consulted  - Perform Range of Motion  times a day  - Reposition patient every  hours    - Dangle patient  times a day  - Stand patient  times a day  - Ambulate patient  times a day  - Out of bed to chair  times a day   - Out of bed for meals  times a day  - Out of bed for toileting  - Record patient progress and toleration of activity level   Outcome: Progressing     Problem: Potential for Falls  Goal: Patient will remain free of falls  Description: INTERVENTIONS:  - Educate patient/family on patient safety including physical limitations  - Instruct patient to call for assistance with activity   - Consult OT/PT to assist with strengthening/mobility   - Keep Call bell within reach  - Keep bed low and locked with side rails adjusted as appropriate  - Keep care items and personal belongings within reach  - Initiate and maintain comfort rounds  - Make Fall Risk Sign visible to staff  - Offer Toileting every  Hours, in advance of need  - Initiate/Maintain alarm  - Obtain necessary fall risk management equipment:   - Apply yellow socks and bracelet for high fall risk patients  - Consider moving patient to room near nurses station  Outcome: Progressing     Problem: Prexisting or High Potential for Compromised Skin Integrity  Goal: Skin integrity is maintained or improved  Description: INTERVENTIONS:  - Identify patients at risk for skin breakdown  - Assess and monitor skin integrity  - Assess and monitor nutrition and hydration status  - Monitor labs   - Assess for incontinence   - Turn and reposition patient  - Assist with mobility/ambulation  - Relieve pressure over bony prominences  - Avoid friction and shearing  - Provide appropriate hygiene as needed including keeping skin clean and dry  - Evaluate need for skin moisturizer/barrier cream  - Collaborate with interdisciplinary team   - Patient/family teaching  - Consider wound care consult   Outcome: Progressing     Problem: PAIN - ADULT  Goal: Verbalizes/displays adequate comfort level or baseline comfort level  Description: Interventions:  - Encourage patient to monitor pain and request assistance  - Assess pain using appropriate pain scale  - Administer analgesics based on type and severity of pain and evaluate response  - Implement non-pharmacological measures as appropriate and evaluate response  - Consider cultural and social influences on pain and pain management  - Notify physician/advanced practitioner if interventions unsuccessful or patient reports new pain  Outcome: Progressing     Problem: INFECTION - ADULT  Goal: Absence or prevention of progression during hospitalization  Description: INTERVENTIONS:  - Assess and monitor for signs and symptoms of infection  - Monitor lab/diagnostic results  - Monitor all insertion sites, i e  indwelling lines, tubes, and drains  - Monitor endotracheal if appropriate and nasal secretions for changes in amount and color  - [place] appropriate cooling/warming therapies per order  - Administer medications as ordered  - Instruct and encourage patient and family to use good hand hygiene technique  - Identify and instruct in appropriate isolation precautions for identified infection/condition  Outcome: Progressing     Problem: SAFETY ADULT  Goal: Maintain or return to baseline ADL function  Description: INTERVENTIONS:  -  Assess patient's ability to carry out ADLs; assess patient's baseline for ADL function and identify physical deficits which impact ability to perform ADLs (bathing, care of mouth/teeth, toileting, grooming, dressing, etc )  - Assess/evaluate cause of self-care deficits   - Assess range of motion  - Assess patient's mobility; develop plan if impaired  - Assess patient's need for assistive devices and provide as appropriate  - Encourage maximum independence but intervene and supervise when necessary  - Involve family in performance of ADLs  - Assess for home care needs following discharge   - Consider OT consult to assist with ADL evaluation and planning for discharge  - Provide patient education as appropriate  Outcome: Progressing  Goal: Maintains/Returns to pre admission functional level  Description: INTERVENTIONS:  - Perform BMAT or MOVE assessment daily    - Set and communicate daily mobility goal to care team and patient/family/caregiver  - Collaborate with rehabilitation services on mobility goals if consulted  - Perform Range of Motion  times a day  - Reposition patient every  hours    - Dangle patient  times a day  - Stand patient  times a day  - Ambulate patient  times a day  - Out of bed to chair  times a day   - Out of bed for meals  times a day  - Out of bed for toileting  - Record patient progress and toleration of activity level   Outcome: Progressing  Goal: Patient will remain free of falls  Description: INTERVENTIONS:  - Educate patient/family on patient safety including physical limitations  - Instruct patient to call for assistance with activity   - Consult OT/PT to assist with strengthening/mobility   - Keep Call bell within reach  - Keep bed low and locked with side rails adjusted as appropriate  - Keep care items and personal belongings within reach  - Initiate and maintain comfort rounds  - Make Fall Risk Sign visible to staff  - Offer Toileting every  Hours, in advance of need  - Initiate/Maintain alarm  - Obtain necessary fall risk management equipment  - Apply yellow socks and bracelet for high fall risk patients  - Consider moving patient to room near nurses station  Outcome: Progressing     Problem: DISCHARGE PLANNING  Goal: Discharge to home or other facility with appropriate resources  Description: INTERVENTIONS:  - Identify barriers to discharge w/patient and caregiver  - Arrange for needed discharge resources and transportation as appropriate  - Identify discharge learning needs (meds, wound care, etc )  - Arrange for interpretive services to assist at discharge as needed  - Refer to Case Management Department for coordinating discharge planning if the patient needs post-hospital services based on physician/advanced practitioner order or complex needs related to functional status, cognitive ability, or social support system  Outcome: Progressing     Problem: GASTROINTESTINAL - ADULT  Goal: Minimal or absence of nausea and/or vomiting  Description: INTERVENTIONS:  - Administer IV fluids if ordered to ensure adequate hydration  - Maintain NPO status until nausea and vomiting are resolved  - Nasogastric tube if ordered  - Administer ordered antiemetic medications as needed  - Provide nonpharmacologic comfort measures as appropriate  - Advance diet as tolerated, if ordered  - Consider nutrition services referral to assist patient with adequate nutrition and appropriate food choices  Outcome: Progressing  Goal: Maintains or returns to baseline bowel function  Description: INTERVENTIONS:  - Assess bowel function  - Encourage oral fluids to ensure adequate hydration  - Administer IV fluids if ordered to ensure adequate hydration  - Administer ordered medications as needed  - Encourage mobilization and activity  - Consider nutritional services referral to assist patient with adequate nutrition and appropriate food choices  Outcome: Progressing  Goal: Maintains adequate nutritional intake  Description: INTERVENTIONS:  - Monitor percentage of each meal consumed  - Identify factors contributing to decreased intake, treat as appropriate  - Assist with meals as needed  - Monitor I&O, weight, and lab values if indicated  - Obtain nutrition services referral as needed  Outcome: Progressing

## 2022-06-18 NOTE — SEPSIS NOTE
Sepsis Note   Oanh Barron 76 y o  female MRN: [id number]  Unit/Bed#: ED 06 Encounter: [id number]       qSOFA     [address] Name 06/18/22 0222 06/18/22 0022             Altered mental status GCS < 15 -- --       Respiratory Rate > / =75 0 0       Systolic BP < / =801 0 0       Q Sofa Score 0 0                  Initial Sepsis Screening     Row Name 06/18/22 0131                Is the patient's history suggestive of a new or worsening infection? Yes (Proceed)  -AO        Suspected source of infection acute abdominal infection  -AO        Are two or more of the following signs & symptoms of infection both present and new to the patient? --        Indicate SIRS criteria Leukocytosis (WBC > 17255 IJL); Tachycardia > 90 bpm  -AO        If the answer is yes to both questions, suspicion of sepsis is present --        If severe sepsis is present AND tissue hypoperfusion perists in the hour after fluid resuscitation or lactate > 4, the patient meets criteria for SEPTIC SHOCK --        Are any of the following organ dysfunction criteria present within 6 hours of suspected infection and SIRS criteria that are NOT considered to be chronic conditions?  Yes  -AO        Organ dysfunction Lactate > 2 0 mmol/L  -AO        Date of presentation of severe sepsis 06/18/22  -AO        Time of presentation of severe sepsis 0131  -AO        Tissue hypoperfusion persists in the hour after crystalloid fluid administration, evidenced, by either: --        Was hypotension present within one hour of the conclusion of crystalloid fluid administration? --        Date of presentation of septic shock --        Time of presentation of septic shock --              User Key  (r) = Recorded By, (t) = Taken By, (c) = Cosigned By    [address]  Name Provider Ave Ayala MD Physician                  Default Flowsheet Data (last 720 hours)     Sepsis Reassess     [address] Name 06/18/22 0325                   Repeat Volume Status and Tissue Perfusion Assessment Performed    Repeat Volume Status and Tissue Perfusion Assessment Performed --                  Volume Status and Tissue Perfusion Post Fluid Resuscitation * Must Document All *    Vital Signs Reviewed (HR, RR, BP, T) Yes  -AO        Shock Index Reviewed --        Arterial Oxygen Saturation Reviewed (POx, SaO2 or SpO2) Yes (comment %)  95%  -AO        Cardio Tachycardia  -AO        Pulmonary Normal effort;Clear to auscultation  -AO        Capillary Refill Brisk  -AO        Peripheral Pulses Radial  -AO        Peripheral Pulse +3  -AO        Skin Warm;Dry  -AO        Urine output assessed None  -AO                  *OR*   Intensive Monitoring- Must Document One of the Following Four *:    Vital Signs Reviewed --        * Central Venous Pressure (CVP or RAP) --        * Central Venous Oxygen (SVO2, ScvO2 or Oxygen saturation via central catheter) --        * Bedside Cardiovascular US in IVC diameter and % collapse --        * Passive Leg Raise OR Crystalloid Challenge --              User Key  (r) = Recorded By, (t) = Taken By, (c) = Cosigned By    [address] Name Provider Luisito Vines MD Physician

## 2022-06-18 NOTE — CONSULTS
Consultation - Acute Care Surgery   Willis Lowe 76 y o  female MRN: [id number]  Unit/Bed#: W -01 Encounter: [id number]      Assessment/Plan      Assessment:  77 y/o F w 1 week of diarrhea presenting with dehydration, lactic acidosis, and incidental finding of pneumobilia  Vss  Afebrile  Abdomen soft, nontender, non distended  Labs:   Lactic acid: 2 9-> 1 5  Cr: 1 36  Wbc: 1 69    Plan:  Ok for clear liquid diet  Follow up stool studies  Rule out c diff  Recommend GI consult   dvt ppx  Continue iv fluids    History of Present Illness   Physician Requesting Consult: Cory Aguayo MD  Reason for Consult / Principal Problem: diarrhea, incident pneumobilia  History, ROS and PFSH unobtainable from any source due to none  HPI: Willis Lowe is a 76y o  year old female w PMH of GERD, diabetes, fibromyalgia, hypertension, hyperlipidemia, irritable bowel syndrome, surgical history of appendectomy, cholecystectomy, TAHBSO, who presents with 1 week of watery diarrhea, incidental finding of pneumobilia on CT imaging  Also with leukocytosis  Lactic acidosis resolved after fluids  Patient denied any current abdominal pain  Last c scope 7/31 showing diverticulosis, EGD showing pyloric ulcers at that time  Denied any current fever, chills chest pain  Or shortness of breath  Inpatient consult to Acute Care Surgery  Consult performed by: Keon Fierro MD  Consult ordered by: Sara Diaz MD          Review of Systems   Constitutional: Negative for chills and fever  HENT: Negative  Eyes: Negative  Respiratory: Negative  Cardiovascular: Negative  Gastrointestinal: Positive for diarrhea  Negative for abdominal distention, abdominal pain, constipation, nausea and vomiting  Endocrine: Negative  Genitourinary: Negative  Musculoskeletal: Negative  Skin: Negative  Allergic/Immunologic: Negative  Neurological: Negative  Hematological: Negative      Psychiatric/Behavioral: Negative  Historical Information   Past Medical History:   Diagnosis Date    Acid reflux     Anemia     hx of iron-deficient    Anxiety     Arthritis     Asthma     last needed inhaler last year 2020    Basal cell carcinoma     upper lip    Chronic narcotic dependence (HCC)     Chronic pain     Colon polyp     Cystocele     Diabetes mellitus ([address] )     stable    Disease of thyroid gland     hypothyroidism    Diverticulosis     Dysfunctional uterine bleeding     last assessed - [id number]    Fibromyalgia     Gastric ulcer     Gastroparesis     History of colonic polyps     last assessed - [id number]    History of gastroesophageal reflux (GERD)     Hypercholesterolemia     Hyperlipidemia     Hypertension     IBS (irritable bowel syndrome)     Post laminectomy syndrome     Seasonal allergies     Spinal stenosis      Past Surgical History:   Procedure Laterality Date    APPENDECTOMY      BACK SURGERY      BREAST CYST EXCISION Left     CHOLECYSTECTOMY      COLONOSCOPY      ESOPHAGOGASTRODUODENOSCOPY N/A 09/28/2016    Procedure: ESOPHAGOGASTRODUODENOSCOPY (EGD); Surgeon: Vini Guzman MD;  [facility]; Service:     HERNIA REPAIR      HYSTERECTOMY      TTAH-BSO age 25   Cassi Crystal LAMINECTOMY      LUMBAR LAMINECTOMY      OOPHORECTOMY      age 25   Cassi Crystal IA ARTHRODESIS POSTERIOR/POSTEROLATERAL THORACIC N/A 06/04/2018    Procedure: Reopening of lumbar incision for T12-L5 posterior instrumented fixation and fusion and T12-L4 posterior decompression;  Surgeon: French Morales MD;  [facility];  Service: Neurosurgery    IA COLONOSCOPY FLX DX W/COLLJ SPEC WHEN PFRMD N/A 03/02/2016    Procedure: EGD AND COLONOSCOPY;  Surgeon: Vini Gzuman MD;  [facility]; Service: Gastroenterology    IA DILATE ESOPHAGUS N/A 09/28/2016    Procedure: DILATATION ESOPHAGEAL;  Surgeon: Vini Guzman MD;  [facility];   Service: Gastroenterology    IA ESOPHAGOGASTRODUODENOSCOPY TRANSORAL DIAGNOSTIC N/A 2016    Procedure: ESOPHAGOGASTRODUODENOSCOPY (EGD); Surgeon: Be Hannon MD;  [facility]; Service: Gastroenterology    MN IMPLANT SPINAL NEUROSTIM/ Left 2018    Procedure: removal of left buttock implantable pulse generator and placement of new  implantable pulse generator;  Surgeon: Jackson Payton MD;  [facility];  Service: Neurosurgery     Social History   Social History     Substance and Sexual Activity   Alcohol Use Not Currently    Comment: Denied history of alcohol use     Social History     Substance and Sexual Activity   Drug Use No    Comment: Denied history of drug use     E-Cigarette/Vaping    E-Cigarette Use Never User      E-Cigarette/Vaping Substances    Nicotine No     THC No     CBD No     Flavoring No     Other No     Unknown No      Social History     Tobacco Use   Smoking Status Former Smoker    Types: Cigarettes    Quit date: 1970    Years since quittin 4   Smokeless Tobacco Never Used   Tobacco Comment    Denied history of current ever day smoker, Former smoker and Never smoker all documented in [employer]     Family History: non-contributory}    Meds/Allergies   all current active meds have been reviewed  Allergies   Allergen Reactions    Penicillins Anaphylaxis, Hives and Other (See Comments)     Other reaction(s): Unknown Reaction    Sulfa Antibiotics Anaphylaxis and Other (See Comments)     Other reaction(s): Unknown Reaction    Aspartame - Food Allergy Other (See Comments) and Hypertension     Slurred speech, weakness, stroke sx    Iodinated Diagnostic Agents Hives     Pt has taken prep prior for contrast and has not had any break through reaction    Keflex [Cephalexin] Hives    Levaquin [Levofloxacin] Rash       Objective   Vitals: Blood pressure 170/77, pulse (!) 106, temperature 99 °F (37 2 °C), resp   rate 18, height 5' (1 524 m), weight 79 5 kg (175 lb 4 3 oz), SpO2 95 %, not currently breastfeeding  ,Body mass index is 34 23 kg/m²  Intake/Output Summary (Last 24 hours) at 6/18/2022 0536  Last data filed at 6/18/2022 0258  Gross per 24 hour   Intake 564 ml   Output --   Net 564 ml     Invasive Devices  Report    Peripheral Intravenous Line  Duration           Peripheral IV 06/18/22 Distal;Left;Ventral (anterior) Forearm <1 day    Peripheral IV 06/18/22 Right Antecubital <1 day                Physical Exam  Vitals reviewed  Constitutional:       Appearance: Normal appearance  HENT:      Head: Normocephalic and atraumatic  Nose: Nose normal    Eyes:      Pupils: Pupils are equal, round, and reactive to light  Cardiovascular:      Rate and Rhythm: Normal rate  Pulses: Normal pulses  Pulmonary:      Effort: Pulmonary effort is normal    Abdominal:      General: There is no distension  Palpations: Abdomen is soft  Tenderness: There is no abdominal tenderness  Genitourinary:     Comments: deferred  Musculoskeletal:         General: Normal range of motion  Cervical back: Normal range of motion and neck supple  Skin:     General: Skin is warm  Capillary Refill: Capillary refill takes 2 to 3 seconds  Neurological:      General: No focal deficit present  Mental Status: She is alert and oriented to person, place, and time  Psychiatric:         Mood and Affect: Mood normal          Lab Results:   I have personally reviewed pertinent reports    , Coags:   Lab Results   Component Value Date    PTT 24 06/18/2022    INR 1 25 (H) 06/18/2022   , Creatinine:   Lab Results   Component Value Date    CREATININE 1 36 (H) 06/18/2022   , Lipid Panel: No results found for: CHOL, CBC with diff:   Lab Results   Component Value Date    WBC 16 90 (H) 06/18/2022    HGB 13 7 06/18/2022    HCT 44 3 06/18/2022    MCV 87 06/18/2022     06/18/2022    MCH 26 8 06/18/2022    MCHC 30 9 (L) 06/18/2022    RDW 14 8 06/18/2022    MPV 11 2 06/18/2022    NRBC 0 06/18/2022   , BMP/CMP:   Lab Results   Component Value Date    SODIUM 137 06/18/2022    K 3 8 06/18/2022     06/18/2022    CO2 23 06/18/2022    BUN 18 06/18/2022    CREATININE 1 36 (H) 06/18/2022    CALCIUM 8 7 06/18/2022    AST 12 (L) 06/18/2022    ALT 8 06/18/2022    ALKPHOS 139 (H) 06/18/2022    EGFR 37 06/18/2022   , Coags:   Lab Results   Component Value Date    PTT 24 06/18/2022    INR 1 25 (H) 06/18/2022     Imaging Studies: I have personally reviewed pertinent reports  EKG, Pathology, and Other Studies: I have personally reviewed pertinent reports  VTE Prophylaxis: Sequential compression device Chapis Ports)      Code Status: Level 1 - Full Code  Advance Directive and Living Will:      Power of :    POLST:      Counseling / Coordination of Care  Counseling/Coordination of Care: Total floor / unit time spent today 30 minutes  Greater than 50% of total time was spent with the patient and / or family counseling and / or coordination of care   A description of the counseling / coordination of care: 30

## 2022-06-18 NOTE — H&P
Tank Del Oh 47 1945, 76 y o  female MRN: [id number]  Unit/Bed#: W -01 Encounter: [id number]  Primary Care Provider: NANY Marquez   Date and time admitted to hospital: 6/18/2022 12:19 AM    * Sepsis [facility])  Assessment & Plan  - Evidenced by Leukocytosis + tachycardia + lactic acid 2 9   - possible source : colitis  - Ct abdomen : There is colonic diverticulosis without evidence of acute diverticulitis  - s/p levoquin and flagyl in the ED + IV fluids, reflex lactic wnl    Plan  Will dc levoquin,  Continue flagyl + rocephin   IV fluid hydration  Follow blood cultures   Monitor cbc    Diarrhea  Assessment & Plan  - POA: multiple episodes of non bloody diarrhea for the past 3 days  - assoc sx: nausea, emesis, abd cramping  - (-) recent antibiotic use, foods unknown procedence, travels  - most recent colonoscopy 7/13/21: left sided diverticulosis , no need for repeat due to age  - initial therapy cholestyramine with partial improvement of symptoms    Plan  IV AB therapy rocephin and flagyl for now  IV fluid hydration  Tigan for N/V  Stool enteric studies  C diff test      Pneumobilia  Assessment & Plan  -Ct abdomen : Subtle pneumobilia in the left lobe of the liver not seen on prior exams  Recommend correlation for evaluation of cholangitis    - no recent h/o surgical procedures/interventions    Plan  General surgery consult      Prolonged Q-T interval on ECG  Assessment & Plan  - QT/QTc  366/511 prior 408/527  - will give Tigan for nausea Tx    Acute-on-chronic kidney injury [facility])  Assessment & Plan  Lab Results   Component Value Date    EGFR 37 06/18/2022    EGFR 51 04/25/2022    EGFR 46 04/24/2022    CREATININE 1 36 (H) 06/18/2022    CREATININE 1 04 04/25/2022    CREATININE 1 12 04/24/2022     - POA : Creatininine 1 3  - baseline seems to be 0 9-1 1  - no other electrolyte imbalances, appears dry on exam  - possible etiologies : prerenal in the setting of volume depletion , ATN in the setting of sepsis  Plan  IV fluid hydration  Will hold patients home lisinopril, baclofen and pyridium  Monitor kidney indices  Avoid hypotension  Avoid nephrotoxins    Overactive bladder  Assessment & Plan  Continue oxybutin    Hypothyroidism  Assessment & Plan  - continue levothyroxine    Anxiety  Assessment & Plan  Continue minalcipran and escitalopram    Hypertension  Assessment & Plan  - sbp noted in the 170 range  - home regimen : lisinopril , metoprolol  - will hold lisinopril in the setting of SADAF  - continue metoprolol  - hydralazine added  Dyspepsia  Assessment & Plan  Continue protonix    Chronic pain disorder  Assessment & Plan  - continue percocet prn, gabapentin    Type 2 diabetes mellitus ([facility] [address] )  Assessment & Plan  Lab Results   Component Value Date    HGBA1C 8 6 (H) 04/22/2022       - home regimen metformin will be held while in hospital  - SSI initiated   - daily glucose checks  - hypoglycemia protocol  - clear liquid diet now, once better tolerance carbohydrate controlled diet  -blood sugar goal 140-180        VTE Prophylaxis: Heparin  / sequential compression device   Code Status: Level 1  POLST: POLST form is not discussed and not completed at this time  Anticipated Length of Stay:  Patient will be admitted on an Inpatient basis with an anticipated length of stay of   2 midnights  Justification for Hospital Stay:  Evaluation and Management of Sepsis    Chief Complaint:  '' I have diarrhea''    History of Present Illness: Alyssia Hendrickson is a 76 y o  female with a past medical history of T2DM, Hypertension, Hypothyroidism, Overactive bladder , chronic pain syndrome, dyslipidemia, PUD, OLGA LIDIA comes to the hospital for evaluation of diarrhea    Patient mentioned that symptomatology started approximately 3 days ago consistent on multiple episodes of non bloody  Diarrhea, per patient up to 9-10 BM/day, she took Questran at home and symptoms seemed to improved , however they recured Tuesday but this time she started to experience significant nausea and emesis for which she decided to come to the hospital   On admission, patient was found hemodynamically stable, afebrile, sbp 170 range, patient was meeting sepsis criteria  Patient will be admitted in the [facility] service for further evaluation and management  Review of Systems:    Review of Systems   Constitutional: Positive for activity change and fatigue  Gastrointestinal: Positive for abdominal pain, diarrhea, nausea and vomiting  All other systems reviewed and are negative  Past Medical and Surgical History:     Past Medical History:   Diagnosis Date    Acid reflux     Anemia     hx of iron-deficient    Anxiety     Arthritis     Asthma     last needed inhaler last year 2020    Basal cell carcinoma     upper lip    Chronic narcotic dependence (HCC)     Chronic pain     Colon polyp     Cystocele     Diabetes mellitus (HCC)     stable    Disease of thyroid gland     hypothyroidism    Diverticulosis     Dysfunctional uterine bleeding     last assessed - [id number]    Fibromyalgia     Gastric ulcer     Gastroparesis     History of colonic polyps     last assessed - [id number]    History of gastroesophageal reflux (GERD)     Hypercholesterolemia     Hyperlipidemia     Hypertension     IBS (irritable bowel syndrome)     Post laminectomy syndrome     Seasonal allergies     Spinal stenosis        Past Surgical History:   Procedure Laterality Date    APPENDECTOMY      BACK SURGERY      BREAST CYST EXCISION Left     CHOLECYSTECTOMY      COLONOSCOPY      ESOPHAGOGASTRODUODENOSCOPY N/A 09/28/2016    Procedure: ESOPHAGOGASTRODUODENOSCOPY (EGD); Surgeon: Mikel Shaver MD;  [facility];   Service:     HERNIA REPAIR      HYSTERECTOMY      TTAH-BSO age 25   Anuj Moose LAMINECTOMY      LUMBAR LAMINECTOMY      OOPHORECTOMY      age 25   Anuj Moose [address] N/A 06/04/2018    Procedure: Reopening of lumbar incision for T12-L5 posterior instrumented fixation and fusion and T12-L4 posterior decompression;  Surgeon: Mike Thomson MD;  [facility];  Service: Neurosurgery    AZ COLONOSCOPY FLX DX W/COLLJ SPEC WHEN PFRMD N/A 03/02/2016    Procedure: EGD AND COLONOSCOPY;  Surgeon: Pippa Viera MD;  [facility]; Service: Gastroenterology    AZ DILATE ESOPHAGUS N/A 09/28/2016    Procedure: DILATATION ESOPHAGEAL;  Surgeon: Pippa Viera MD;  [facility]; Service: Gastroenterology    AZ ESOPHAGOGASTRODUODENOSCOPY TRANSORAL DIAGNOSTIC N/A 07/18/2016    Procedure: ESOPHAGOGASTRODUODENOSCOPY (EGD); Surgeon: Pippa Viera MD;  [facility]; Service: Gastroenterology    AZ IMPLANT SPINAL NEUROSTIM/ Left 06/04/2018    Procedure: removal of left buttock implantable pulse generator and placement of new  implantable pulse generator;  Surgeon: Mike Thomson MD;  [facility];  Service: Neurosurgery       Meds/Allergies:    Prior to Admission medications    Medication Sig Start Date End Date Taking?  Authorizing Provider   albuterol (PROVENTIL HFA,VENTOLIN HFA) 90 mcg/act inhaler Inhale 2 puffs every 6 (six) hours as needed for wheezing or shortness of breath 5/11/20   Norma Baires,    ALPRAZolam Brendalylucia Caraballo) 0 25 mg tablet Take 1 tablet (0 25 mg total) by mouth 3 (three) times a day as needed for anxiety 1/3/22   NANY Marquez   aspirin 81 mg chewable tablet Chew 1 tablet (81 mg total) daily 4/26/22 5/26/22  Nabila Whitmore MD   atorvastatin (LIPITOR) 40 mg tablet Take 1 tablet (40 mg total) by mouth daily 6/3/22   NANY Marquez   baclofen 10 mg tablet Take one tablet by mouth three times a day as needed for muscle spasms 6/8/22   NANY Marquez   BinaxNOW COVID-19 Ag Home Test KIT USE  GUIDANCE ON PACKAGE 4/2/22   Historical Provider, MD   cholestyramine (QUESTRAN) 4 g packet Take 1 packet (4 g total) by mouth 2 (two) times a day with meals 6/16/22   Earney Points, CRNP   dicyclomine (BENTYL) 20 mg tablet Take 1 tablet (20 mg total) by mouth every 6 (six) hours as needed (bowel spasm) 11/23/21   Earney Points, CRNP   escitalopram (Lexapro) 10 mg tablet Take 1 tablet (10 mg total) by mouth daily 5/7/22   Maria C Baires, DO   estradiol (ESTRACE VAGINAL) 0 1 mg/g vaginal cream Insert 1 g into the vagina 3 (three) times a week 3/28/22   Ashleigh Kaur PA-C   gabapentin (NEURONTIN) 400 mg capsule Take 1 capsule (400 mg total) by mouth daily at bedtime 5/13/22   Maria C Baires, DO   glucose blood test strip Bid testing 2/27/19   Maria C Baires DO   latanoprost (XALATAN) 0 005 % ophthalmic solution Administer 1 drop to both eyes daily at bedtime 10/2/20 7/20/21  Maria C Baires,    levothyroxine 25 mcg tablet Take 1 5 tablets (37 5 mcg total) by mouth in the morning  5/11/22 6/10/22  Earney Points, CRNP   lisinopril (ZESTRIL) 20 mg tablet Take 1 tablet (20 mg total) by mouth daily 6/2/21 9/30/21  Earney Points, CRNP   meclizine (ANTIVERT) 25 mg tablet Take 1 tablet (25 mg total) by mouth 4 (four) times a day as needed for dizziness 3/23/22 5/2/22  Earney Points, CRNP   metFORMIN (GLUCOPHAGE) 1000 MG tablet Take 1 tablet (1,000 mg total) by mouth 2 (two) times a day with meals 3/9/22   Earney Points, CRNP   metoclopramide (REGLAN) 10 mg tablet Take 1 tablet (10 mg total) by mouth 4 (four) times a day 6/24/21   Earney Points, CRNP   metoprolol tartrate (LOPRESSOR) 25 mg tablet Take 0 5 tablets (12 5 mg total) by mouth every 12 (twelve) hours 5/23/22   Earney Points, CRNP   Milnacipran HCl (Savella) 100 MG TABS Take 1 tablet (100 mg total) by mouth daily 9/13/21   Maria C Baires DO   ondansetron (ZOFRAN) 8 mg tablet Take 1 tablet (8 mg total) by mouth every 8 (eight) hours as needed for nausea or vomiting 3/9/22   NANY Kenney oxyCODONE-acetaminophen (PERCOCET)  mg per tablet Take 1 tablet by mouth every 6 (six) hours as needed for severe pain Max Daily Amount: 4 tablets 22   NANY Russell   pantoprazole (PROTONIX) 40 mg tablet Take 1 tablet (40 mg total) by mouth daily 7/22/21 10/20/21  Gus Miranda MD   phenazopyridine (PYRIDIUM) 100 mg tablet Take 1 tablet (100 mg total) by mouth 3 (three) times a day as needed for bladder spasms 21   NANY Russell   trospium chloride (SANCTURA) 20 mg tablet Take 1 tablet (20 mg total) by mouth 2 (two) times a day 3/28/22   Ashleigh Kaur PA-C   zolpidem (AMBIEN) 5 mg tablet Take 1 tablet (5 mg total) by mouth daily at bedtime as needed for sleep 22   NANY Russell     I have reviewed home medications with patient personally  Allergies: Allergies   Allergen Reactions    Penicillins Anaphylaxis, Hives and Other (See Comments)     Other reaction(s): Unknown Reaction    Sulfa Antibiotics Anaphylaxis and Other (See Comments)     Other reaction(s): Unknown Reaction    Aspartame - Food Allergy Other (See Comments) and Hypertension     Slurred speech, weakness, stroke sx    Iodinated Diagnostic Agents Hives     Pt has taken prep prior for contrast and has not had any break through reaction    Keflex [Cephalexin] Hives    Levaquin [Levofloxacin] Rash       Social History:     Marital Status:     Occupation: Retired  Patient Pre-hospital Living Situation: Lives at home  Patient Pre-hospital Level of Mobility: ambulates without assistance   Patient Pre-hospital Diet Restrictions: carbohydrate controlled diet  Substance Use History:   Social History     Substance and Sexual Activity   Alcohol Use Not Currently    Comment: Denied history of alcohol use     Social History     Tobacco Use   Smoking Status Former Smoker    Types: Cigarettes    Quit date: 1970    Years since quittin 4   Smokeless Tobacco Never Used   Tobacco Comment Denied history of current ever day smoker, Former smoker and Never smoker all documented in [employer]     Social History     Substance and Sexual Activity   Drug Use No    Comment: Denied history of drug use       Family History:    Family History   Problem Relation Age of Onset    Lung cancer Mother 55    Pulmonary embolism Father     No Known Problems Sister     No Known Problems Daughter     No Known Problems Daughter     Stroke Maternal Grandmother     Heart attack Maternal Grandfather     No Known Problems Paternal Grandmother     No Known Problems Paternal Grandfather     No Known Problems Maternal Aunt     Diabetes Family         Diabetes mellitus    Hypertension Family     Stroke Family         Stroke complications       Physical Exam:     Vitals:   Blood Pressure: 170/77 (06/18/22 0424)  Pulse: (!) 106 (06/18/22 0222)  Temperature: 99 °F (37 2 °C) (06/18/22 0424)  Temp Source: Oral (06/18/22 0022)  Respirations: 18 (06/18/22 0424)  Height: 5' (152 4 cm) (06/18/22 0300)  Weight - Scale: 79 5 kg (175 lb 4 3 oz) (06/18/22 0300)  SpO2: 95 % (06/18/22 0222)    Physical Exam  Vitals and nursing note reviewed  Constitutional:       Appearance: She is ill-appearing  HENT:      Head: Normocephalic and atraumatic  Right Ear: Tympanic membrane normal  There is no impacted cerumen  Left Ear: Tympanic membrane normal  There is no impacted cerumen  Nose: Nose normal  No congestion or rhinorrhea  Mouth/Throat:      Mouth: Mucous membranes are dry  Pharynx: Oropharynx is clear  No oropharyngeal exudate or posterior oropharyngeal erythema  Eyes:      General:         Right eye: No discharge  Left eye: No discharge  Extraocular Movements: Extraocular movements intact  Conjunctiva/sclera: Conjunctivae normal       Pupils: Pupils are equal, round, and reactive to light  Cardiovascular:      Rate and Rhythm: Normal rate  Pulses: Normal pulses        Heart sounds: No murmur heard  No gallop  Pulmonary:      Effort: Pulmonary effort is normal  No respiratory distress  Breath sounds: No wheezing or rales  Chest:      Chest wall: No tenderness  Abdominal:      General: Bowel sounds are normal  There is no distension  Palpations: Abdomen is soft  Tenderness: There is abdominal tenderness (mild tenderness LLQ no signs of peritoneal irritation)  Musculoskeletal:         General: Normal range of motion  Cervical back: Normal range of motion  No rigidity  Right lower leg: No edema  Left lower leg: No edema  Lymphadenopathy:      Cervical: No cervical adenopathy  Skin:     General: Skin is warm  Capillary Refill: Capillary refill takes 2 to 3 seconds  Neurological:      Mental Status: She is alert and oriented to person, place, and time  Psychiatric:         Mood and Affect: Mood normal          Behavior: Behavior normal          Thought Content: Thought content normal          Judgment: Judgment normal          Additional Data:     Lab Results: I have personally reviewed pertinent reports  Results from last 7 days   Lab Units 06/18/22  0042   WBC Thousand/uL 16 90*   HEMOGLOBIN g/dL 13 7   HEMATOCRIT % 44 3   PLATELETS Thousands/uL 342   NEUTROS PCT % 76*   LYMPHS PCT % 9*   MONOS PCT % 7   EOS PCT % 7*     Results from last 7 days   Lab Units 06/18/22  0042   POTASSIUM mmol/L 3 8   CHLORIDE mmol/L 104   CO2 mmol/L 23   BUN mg/dL 18   CREATININE mg/dL 1 36*   CALCIUM mg/dL 8 7   ALK PHOS U/L 139*   ALT U/L 8   AST U/L 12*     Results from last 7 days   Lab Units 06/18/22  0042   INR  1 25*       Imaging: I have personally reviewed pertinent reports  CT abdomen pelvis wo contrast    Result Date: 6/18/2022  Narrative: CT ABDOMEN AND PELVIS WITHOUT IV CONTRAST INDICATION:   Abdominal pain, acute, nonlocalized abdominal pain, N/V/D  Vickie Carolnia COMPARISON:  7/15/2020 and 5/10/2021   TECHNIQUE:  CT examination of the abdomen and pelvis was performed without intravenous contrast  This examination was performed without intravenous contrast in the context of the critical nationwide Omnipaque shortage  Axial, sagittal, and coronal 2D reformatted images were created from the source data and submitted for interpretation  Radiation dose length product (DLP) for this visit:  498 mGy-cm   This examination, like all CT scans performed in the [facility], was performed utilizing techniques to minimize radiation dose exposure, including the use of iterative reconstruction and automated exposure control  Enteric contrast was not administered  FINDINGS: ABDOMEN LOWER CHEST:  No clinically significant abnormality identified in the visualized lower chest  LIVER/BILIARY TREE:  Subtle pneumobilia in the left lobe of the liver not seen on prior exams  Differential is broad; has the patient had a procedure? Recommend correlation for evaluation of cholangitis  Sohail Sethi GALLBLADDER:  Gallbladder is surgically absent  SPLEEN:  Unremarkable  PANCREAS:  Unremarkable  ADRENAL GLANDS:  Unremarkable  KIDNEYS/URETERS:  Unremarkable  No hydronephrosis  STOMACH AND BOWEL:  There is colonic diverticulosis without evidence of acute diverticulitis  APPENDIX:  No findings to suggest appendicitis  ABDOMINOPELVIC CAVITY:  No ascites  No pneumoperitoneum  No lymphadenopathy  VESSELS:  Unremarkable for patient's age  PELVIS REPRODUCTIVE ORGANS:  Unremarkable for patient's age  URINARY BLADDER:  Unremarkable  ABDOMINAL WALL/INGUINAL REGIONS:  Postsurgical changes in the anterior abdominal wall  OSSEOUS STRUCTURES:  No acute fracture or destructive osseous lesion  Postsurgical changes lumbar spine  Impression: Subtle pneumobilia in the left lobe of the liver not seen on prior exams  Differential is broad; has the patient had a procedure? Recommend correlation for evaluation of cholangitis     I personally discussed this study with Kerrie Díaz on 6/18/2022 at 2:25 AM  Workstation performed: [id number]     Mammo screening bilateral w 3d & cad    Result Date: 5/27/2022  Narrative: DIAGNOSIS: Encounter for screening mammogram for malignant neoplasm of breast TECHNIQUE: Digital screening mammography was performed  Computer Aided Detection (CAD) analyzed all applicable images  COMPARISONS: Prior breast imaging dated: 06/15/2021, 05/20/2021, 05/17/2019, 02/28/2018, 07/21/2016, 05/01/2014, and 04/23/2014 RELEVANT HISTORY: Family Breast Cancer History: No known family history of breast cancer  Family Medical History: No known relevant family medical history  Personal History: Hormone history includes estrogen replacement therapy  Surgical history includes breast excisional biopsy, hysterectomy, and oophorectomy  No known relevant medical history  The patient is scheduled in a reminder system for screening mammography  8-10% of cancers will be missed on mammography  Management of a palpable abnormality must be based on clinical grounds  Patients will be notified of their results via letter from our facility  Accredited by [employer] of Radiology and FDA  RISK ASSESSMENT: 5 Year Tyrer-Cuzick: 1 17 % 10 Year Tyrer-Cuzick: No Score Lifetime Tyrer-Cuzick: 1 42 % TISSUE DENSITY: There are scattered areas of fibroglandular density  INDICATION: Claudia Cuenca is a 76 y o  female presenting for screening mammography  FINDINGS: LEFT B) POST-SURGICAL FINDING: There are post-surgical findings from a previous excisional biopsy seen in the upper inner quadrant of the left breast in the anterior depth  Compared to the previous study, there are no significant changes  Right There are no suspicious masses, grouped microcalcifications or areas of unexplained architectural distortion  The skin and nipple areolar complex are unremarkable  Impression: No mammographic evidence of malignancy   ASSESSMENT/BI-RADS CATEGORY: Left: 2 - Benign Right: 1 - Negative Overall: 2 - Benign RECOMMENDATION:      - Routine screening mammogram in 1 year for both breasts  Workstation ID: [id number]       EKG, Pathology, and Other Studies Reviewed on Admission:   · EKG: NSR no st- t wave changes    Epic / Care Everywhere Records Reviewed: Yes     ** Please Note: This note has been constructed using a voice recognition system       Nutrition Assessment and Intervention:     Reviewed food recall journal      Physical Activity Assessment and Intervention:    Activity journal reviewed      Emotional and Mental Well-being, Sleep, Connectedness Assessment and Intervention:    Sleep/stress assessment performed      Tobacco and Toxic Substance Assessment and Intervention:     Tobacco use screening performed    Alcohol and drug use screening performed

## 2022-06-18 NOTE — QUICK NOTE
Reached out to nursing to ensure that potassium and calcium has been given  Ordered by resident at 7 pm - po and IV  Also IV calcium ordered by resident at 7 pm    Telemetry also ordered and relayed to charge nurse and primary nurse

## 2022-06-18 NOTE — CONSULTS
Consultation - [facility] Gastroenterology   Willis Billing 76 y o  female MRN: [id number]  Unit/Bed#: W -01 Encounter: [id number]        Inpatient consult to gastroenterology  Consult performed by: NANY Jordan  Consult ordered by: Keon Fierro MD          Reason for Consult / Principal Problem:     pneumobilia      ASSESSMENT AND PLAN:        #1 Pneumobilia-subtle pneumobilia left lobe of the liver seen incidentally on CT scan on admission which wasn't noted on prior exams  Denies any history of ERCP  She does have history of cholecystectomy 20+ years ago  LFTs all normal except for chronically elevated alkaline phosphatase  Less likely cholangitis as total bilirubin remains normal  Has minimal tenderness in right upper quadrant/epigastric area  -check GGT  -Monitor LFTs  -RUQ US ordered for further evaluation  Patient cannot have MRI due to spinal stimulator  -f/u blood cx  -She's already on abx to treat intra abdominal infection  -may need ERCP for further evaluation pending course    #2 Diarrhea-pt p/w acute onset lower abdominal cramping/diarrhea x 3 days  Originally improved w/ questran but then worsened again  She developed chills and nausea/vomiting and presented to the ED for further evaluation where WBC elevated 16, creatinine elevated 1 36, and HR 120s  There was no evidence of colitis on noncontrast CT  Reports recent abx use for sinus infection 2 weeks ago  Suspect infectious etiology given acute onset, r/o C diff  Reports BMs slowing down but still liquid   No further vomiting     -f/u stool studies  -monitor CBC, lytes, replete as needed  -continue supportive care  -follow up blood cx  -Continue antibiotics (pt on Levaquin/flagyl)-also started empirically on vancomycin by primary team  -advance to low-fiber low residue/lactose restricted diet as lila     #3 PUD, antral intestinal metaplasia  #4 Gastroparesis  Patient has history of gastroparesis maintained on gastroparesis diet and Reglan 10 mg 4 times daily at home  Her last EGD was in July 2021 showing 2 small clean based pre-pyloric ulcers which bled on contact, biopsies from the antrum EGD were negative for H pylori but did show intestinal metaplasia   -avoid nsaids   -continue PPI, Reglan  -patient overdue for repeat EGD to check for ulcer healing/intestinal metaplasia surveillance which she can have as an outpatient  ______________________________________________________________________    HPI:  Oanh Barron is a 76 y o  female with a past medical history of T2DM, Hypertension, Hypothyroidism, Overactive bladder , chronic pain syndrome, dyslipidemia, PUD, OLGA LIDIA, gastroparesis comes to the hospital for evaluation of diarrhea x 3 days 9-10 BM daily  She tried Questran which improved symptoms but then they reoccurred  She developed  significant nausea and emesis and sweats/chills for which she decided to come to the hospital  She met sepsis criteria in the ED with WBC 16, HR 120s ; CT AP done showed subtle pneumobilia in the left lobe of the liver not seen on prior exams  She is afebrile  Blood cultures are pending  C diff is in process, enteric stool needs TBC  She was started on antibiotics with rocephin/flagyl p o  Vanco   Pt reports hx of gallstones, no ERCP  She is s/p cholecystectomy 20+ years ago  She has isolated elevated alkaline phosphatase which appears has been chronically elevated since May 2021  Reports diarrhea seems to have slowed down, had 5 liquid BM today  No further vomiting, tolerating clears  Abdominal pain in lower abdomen with BMs which feels like cramping sensation  She is minimally tender RUQ/epigastric area  Reports Levaquin 2 weeks ago for sinus infection  Last EGD/Colonoscopy in July 2021 for hx gastroparesis, PUD, OLGA LIDIA showed Moderate streaking antral erythema and mucosal hemorrhage  Two small clean based pre-pyloric ulcers which bled on contact  Biopsies obtained  Left sided diverticulosis  Antral biopsy had shown chronic active gastritis and intestinal metaplasia negative for H pylori  She was supposed to repeat EGD in 2 months and is overdue  REVIEW OF SYSTEMS:    CONSTITUTIONAL: Denies any fever, chills, rigors, and weight loss  HEENT: No earache or tinnitus  Denies hearing loss or visual disturbances  CARDIOVASCULAR: No chest pain or palpitations  RESPIRATORY: Denies any cough, hemoptysis, shortness of breath or dyspnea on exertion  GASTROINTESTINAL: As noted in the History of Present Illness  GENITOURINARY: No problems with urination  Denies any hematuria or dysuria  NEUROLOGIC: No dizziness or vertigo, denies headaches  MUSCULOSKELETAL: Denies any muscle or joint pain  SKIN: Denies skin rashes or itching  ENDOCRINE: Denies excessive thirst  Denies intolerance to heat or cold  PSYCHOSOCIAL: Denies depression or anxiety  Denies any recent memory loss         Historical Information   Past Medical History:   Diagnosis Date    Acid reflux     Anemia     hx of iron-deficient    Anxiety     Arthritis     Asthma     last needed inhaler last year 2020    Basal cell carcinoma     upper lip    Chronic narcotic dependence (HCC)     Chronic pain     Colon polyp     Cystocele     Diabetes mellitus ([facility] [address] )     stable    Disease of thyroid gland     hypothyroidism    Diverticulosis     Dysfunctional uterine bleeding     last assessed - [id number]    Fibromyalgia     Gastric ulcer     Gastroparesis     History of colonic polyps     last assessed - [id number]    History of gastroesophageal reflux (GERD)     Hypercholesterolemia     Hyperlipidemia     Hypertension     IBS (irritable bowel syndrome)     Post laminectomy syndrome     Seasonal allergies     Spinal stenosis      Past Surgical History:   Procedure Laterality Date    APPENDECTOMY      BACK SURGERY      BREAST CYST EXCISION Left     CHOLECYSTECTOMY      COLONOSCOPY      ESOPHAGOGASTRODUODENOSCOPY N/A 2016    Procedure: ESOPHAGOGASTRODUODENOSCOPY (EGD); Surgeon: Bettie Graham MD;  [facility]; Service:     HERNIA REPAIR      HYSTERECTOMY      TTAH-BSO age 25   Cassandra Credit LAMINECTOMY      LUMBAR LAMINECTOMY      OOPHORECTOMY      age 25   Cassandra Credit RI ARTHRODESIS POSTERIOR/POSTEROLATERAL THORACIC N/A 2018    Procedure: Reopening of lumbar incision for T12-L5 posterior instrumented fixation and fusion and T12-L4 posterior decompression;  Surgeon: Azam Frey MD;  [facility];  Service: Neurosurgery    RI COLONOSCOPY FLX DX W/COLLJ SPEC WHEN PFRMD N/A 2016    Procedure: EGD AND COLONOSCOPY;  Surgeon: Bettie Graham MD;  [facility]; Service: Gastroenterology    RI DILATE ESOPHAGUS N/A 2016    Procedure: DILATATION ESOPHAGEAL;  Surgeon: Bettie Graham MD;  [facility]; Service: Gastroenterology    RI ESOPHAGOGASTRODUODENOSCOPY TRANSORAL DIAGNOSTIC N/A 2016    Procedure: ESOPHAGOGASTRODUODENOSCOPY (EGD); Surgeon: Bettie Graham MD;  [facility];   Service: Gastroenterology    RI IMPLANT SPINAL NEUROSTIM/ Left 2018    Procedure: removal of left buttock implantable pulse generator and placement of new  implantable pulse generator;  Surgeon: Azam Frey MD;  [facility];  Service: Neurosurgery     Social History   Social History     Substance and Sexual Activity   Alcohol Use Not Currently    Comment: Denied history of alcohol use     Social History     Substance and Sexual Activity   Drug Use No    Comment: Denied history of drug use     Social History     Tobacco Use   Smoking Status Former Smoker    Types: Cigarettes    Quit date: 1970    Years since quittin 4   Smokeless Tobacco Never Used   Tobacco Comment    Denied history of current ever day smoker, Former smoker and Never smoker all documented in [employer]     Family History   Problem Relation Age of Onset    Lung cancer Mother 55    Pulmonary embolism Father     No Known Problems Sister     No Known Problems Daughter     No Known Problems Daughter     Stroke Maternal Grandmother     Heart attack Maternal Grandfather     No Known Problems Paternal Grandmother     No Known Problems Paternal Grandfather     No Known Problems Maternal Aunt     Diabetes Family         Diabetes mellitus    Hypertension Family     Stroke Family         Stroke complications       Meds/Allergies     Medications Prior to Admission   Medication    albuterol (PROVENTIL HFA,VENTOLIN HFA) 90 mcg/act inhaler    ALPRAZolam (XANAX) 0 25 mg tablet    aspirin 81 mg chewable tablet    atorvastatin (LIPITOR) 40 mg tablet    baclofen 10 mg tablet    BinaxNOW COVID-19 Ag Home Test KIT    cholestyramine (QUESTRAN) 4 g packet    dicyclomine (BENTYL) 20 mg tablet    escitalopram (Lexapro) 10 mg tablet    estradiol (ESTRACE VAGINAL) 0 1 mg/g vaginal cream    gabapentin (NEURONTIN) 400 mg capsule    glucose blood test strip    latanoprost (XALATAN) 0 005 % ophthalmic solution    levothyroxine 25 mcg tablet    lisinopril (ZESTRIL) 20 mg tablet    meclizine (ANTIVERT) 25 mg tablet    metFORMIN (GLUCOPHAGE) 1000 MG tablet    metoclopramide (REGLAN) 10 mg tablet    metoprolol tartrate (LOPRESSOR) 25 mg tablet    Milnacipran HCl (Savella) 100 MG TABS    ondansetron (ZOFRAN) 8 mg tablet    oxyCODONE-acetaminophen (PERCOCET)  mg per tablet    pantoprazole (PROTONIX) 40 mg tablet    phenazopyridine (PYRIDIUM) 100 mg tablet    trospium chloride (SANCTURA) 20 mg tablet    zolpidem (AMBIEN) 5 mg tablet     Current Facility-Administered Medications   Medication Dose Route Frequency    acetaminophen (TYLENOL) tablet 650 mg  650 mg Oral Q6H PRN    albuterol (PROVENTIL HFA,VENTOLIN HFA) inhaler 2 puff  2 puff Inhalation Q6H PRN    ALPRAZolam (XANAX) tablet 0 25 mg  0 25 mg Oral TID PRN    aspirin chewable tablet 81 mg  81 mg Oral Daily    atorvastatin (LIPITOR) tablet 40 mg 40 mg Oral Daily    cefTRIAXone (ROCEPHIN) 1,000 mg in dextrose 5 % 50 mL IVPB  1,000 mg Intravenous Q24H    escitalopram (LEXAPRO) tablet 10 mg  10 mg Oral Daily    gabapentin (NEURONTIN) capsule 400 mg  400 mg Oral HS    heparin (porcine) subcutaneous injection 5,000 Units  5,000 Units Subcutaneous Q8H [address]    hydrALAZINE (APRESOLINE) injection 10 mg  10 mg Intravenous Q6H PRN    insulin lispro (HumaLOG) 100 units/mL subcutaneous injection 1-6 Units  1-6 Units Subcutaneous TID AC    latanoprost (XALATAN) 0 005 % ophthalmic solution 1 drop  1 drop Both Eyes HS    levothyroxine tablet 37 5 mcg  37 5 mcg Oral Early Morning    meclizine (ANTIVERT) tablet 25 mg  25 mg Oral 4x Daily PRN    metoprolol tartrate (LOPRESSOR) tablet 12 5 mg  12 5 mg Oral Q12H [address]    metroNIDAZOLE (FLAGYL) IVPB (premix) 500 mg 100 mL  500 mg Intravenous Q8H    Milnacipran HCl TABS 100 mg  1 tablet Oral Daily    nystatin (MYCOSTATIN) powder   Topical BID    oxybutynin (DITROPAN) tablet 5 mg  5 mg Oral BID    oxyCODONE-acetaminophen (PERCOCET) 5-325 mg per tablet 1 tablet  1 tablet Oral Q6H PRN    pantoprazole (PROTONIX) EC tablet 40 mg  40 mg Oral Daily    sodium chloride 0 9 % infusion  125 mL/hr Intravenous Continuous    sodium chloride 0 9 % infusion  100 mL/hr Intravenous Continuous    trimethobenzamide (TIGAN) IM injection 200 mg  200 mg Intramuscular Q6H PRN    zolpidem (AMBIEN) tablet 5 mg  5 mg Oral HS PRN       Allergies   Allergen Reactions    Penicillins Anaphylaxis, Hives and Other (See Comments)     Other reaction(s): Unknown Reaction    Sulfa Antibiotics Anaphylaxis and Other (See Comments)     Other reaction(s): Unknown Reaction    Aspartame - Food Allergy Other (See Comments) and Hypertension     Slurred speech, weakness, stroke sx    Iodinated Diagnostic Agents Hives     Pt has taken prep prior for contrast and has not had any break through reaction    Keflex [Cephalexin] Hives    Levaquin [Levofloxacin] Rash           Objective     Blood pressure (!) 174/88, pulse 62, temperature 97 8 °F (36 6 °C), resp  rate 18, height 5' (1 524 m), weight 79 5 kg (175 lb 4 3 oz), SpO2 99 %, not currently breastfeeding  Body mass index is 34 23 kg/m²  Intake/Output Summary (Last 24 hours) at 6/18/2022 1141  Last data filed at 6/18/2022 0258  Gross per 24 hour   Intake 564 ml   Output --   Net 564 ml         PHYSICAL EXAM:      General Appearance:   Alert, cooperative, no distress   HEENT:   Normocephalic, atraumatic, anicteric  Neck:  Supple, symmetrical, trachea midline   Lungs:   Clear to auscultation bilaterally; no rales, rhonchi or wheezing; respirations unlabored    Heart[de-identified]   Regular rate and rhythm; no murmur, rub, or gallop     Abdomen:   Soft, lower abdominal-tenderness> right upper quadrant/epigastric area, non-distended; normal bowel sounds; no masses, no organomegaly    Genitalia:   Deferred    Rectal:   Deferred    Extremities:  No cyanosis, clubbing or edema    Pulses:  2+ and symmetric all extremities    Skin:  No jaundice, rashes, or lesions    Lymph nodes:  No palpable cervical lymphadenopathy        Lab Results:   Admission on 06/18/2022   Component Date Value    WBC 06/18/2022 16 90 (A)    RBC 06/18/2022 5 12     Hemoglobin 06/18/2022 13 7     Hematocrit 06/18/2022 44 3     MCV 06/18/2022 87     MCH 06/18/2022 26 8     MCHC 06/18/2022 30 9 (A)    RDW 06/18/2022 14 8     MPV 06/18/2022 11 2     Platelets 14/49/5263 342     nRBC 06/18/2022 0     Neutrophils Relative 06/18/2022 76 (A)    Immat GRANS % 06/18/2022 1     Lymphocytes Relative 06/18/2022 9 (A)    Monocytes Relative 06/18/2022 7     Eosinophils Relative 06/18/2022 7 (A)    Basophils Relative 06/18/2022 0     Neutrophils Absolute 06/18/2022 12 87 (A)    Immature Grans Absolute 06/18/2022 0 16     Lymphocytes Absolute 06/18/2022 1 54     Monocytes Absolute 06/18/2022 1 12     Eosinophils Absolute 06/18/2022 1 15 (A)    Basophils Absolute 06/18/2022 0 06     Protime 06/18/2022 15 6 (A)    INR 06/18/2022 1 25 (A)    PTT 06/18/2022 24     Sodium 06/18/2022 137     Potassium 06/18/2022 3 8     Chloride 06/18/2022 104     CO2 06/18/2022 23     ANION GAP 06/18/2022 10     BUN 06/18/2022 18     Creatinine 06/18/2022 1 36 (A)    Glucose 06/18/2022 225 (A)    Calcium 06/18/2022 8 7     Corrected Calcium 06/18/2022 9 2     AST 06/18/2022 12 (A)    ALT 06/18/2022 8     Alkaline Phosphatase 06/18/2022 139 (A)    Total Protein 06/18/2022 6 2 (A)    Albumin 06/18/2022 3 4 (A)    Total Bilirubin 06/18/2022 0 29     eGFR 06/18/2022 37     Lipase 06/18/2022 16     LACTIC ACID 06/18/2022 2 9 (A)    Color, UA 06/18/2022 Yellow     Clarity, UA 06/18/2022 Cloudy     Specific Gravity, UA 06/18/2022 >=1 030     pH, UA 06/18/2022 6 0     Leukocytes, UA 06/18/2022 Trace (A)    Nitrite, UA 06/18/2022 Negative     Protein, UA 06/18/2022 30 (1+) (A)    Glucose, UA 06/18/2022 Negative     Ketones, UA 06/18/2022 Negative     Urobilinogen, UA 06/18/2022 0 2     Bilirubin, UA 06/18/2022 Negative     Blood, UA 06/18/2022 Large (A)    Blood Culture 06/18/2022 Received in Microbiology Lab  Culture in Progress   Blood Culture 06/18/2022 Received in Microbiology Lab  Culture in Progress   LACTIC ACID 06/18/2022 1 5     Procalcitonin 06/18/2022 0 07     POC Glucose 06/18/2022 179 (A)    RBC, UA 06/18/2022 2-4     WBC, UA 06/18/2022 2-4     Epithelial Cells 06/18/2022 Moderate (A)    Bacteria, UA 06/18/2022 Moderate (A)    POC Glucose 06/18/2022 140        Imaging Studies: I have personally reviewed pertinent imaging studies

## 2022-06-18 NOTE — ED NOTES
Iv abx stopped due to hives and swelling at IV site  MD dereje Joe, [facility]  06/18/22 0300

## 2022-06-18 NOTE — ED PROVIDER NOTES
History  Chief Complaint   Patient presents with    Diarrhea     Diarrhea for 7 days, started vomiting today, recent admission for TIA     Patient is a 76year old female with 1 week of intermittent worsening diarrhea and abdominal pain with N/V tonight  No fever  No urinary sx  No travel  No raw meat, eggs, fish or recent abx use  Has been using cholestyramine without relief of diarrhea  Was last seen in this ED on 4/22/22 for stroke like sx  Has had prior ccy, appy, hernia repair, SABA/BSO  [facility] website checked on this patient and last Rx filled was on 6/8/22 for ambien for 30 day supply  History provided by:  Patient and EMS personnel   used: No    Diarrhea  Associated symptoms: abdominal pain and vomiting    Associated symptoms: no fever        Prior to Admission Medications   Prescriptions Last Dose Informant Patient Reported? Taking?    ALPRAZolam (XANAX) 0 25 mg tablet  Self No No   Sig: Take 1 tablet (0 25 mg total) by mouth 3 (three) times a day as needed for anxiety   BinaxNOW COVID-19 Ag Home Test KIT   Yes No   Sig: USE  GUIDANCE ON PACKAGE   Milnacipran HCl (Savella) 100 MG TABS  Self No No   Sig: Take 1 tablet (100 mg total) by mouth daily   albuterol (PROVENTIL HFA,VENTOLIN HFA) 90 mcg/act inhaler  Self No No   Sig: Inhale 2 puffs every 6 (six) hours as needed for wheezing or shortness of breath   aspirin 81 mg chewable tablet   No No   Sig: Chew 1 tablet (81 mg total) daily   atorvastatin (LIPITOR) 40 mg tablet   No No   Sig: Take 1 tablet (40 mg total) by mouth daily   baclofen 10 mg tablet   No No   Sig: Take one tablet by mouth three times a day as needed for muscle spasms   cholestyramine (QUESTRAN) 4 g packet   No No   Sig: Take 1 packet (4 g total) by mouth 2 (two) times a day with meals   dicyclomine (BENTYL) 20 mg tablet  Self No No   Sig: Take 1 tablet (20 mg total) by mouth every 6 (six) hours as needed (bowel spasm)   escitalopram (Lexapro) 10 mg tablet   No No   Sig: Take 1 tablet (10 mg total) by mouth daily   estradiol (ESTRACE VAGINAL) 0 1 mg/g vaginal cream   No No   Sig: Insert 1 g into the vagina 3 (three) times a week   gabapentin (NEURONTIN) 400 mg capsule   No No   Sig: Take 1 capsule (400 mg total) by mouth daily at bedtime   glucose blood test strip  Self No No   Sig: Bid testing   latanoprost (XALATAN) 0 005 % ophthalmic solution  Child No No   Sig: Administer 1 drop to both eyes daily at bedtime   levothyroxine 25 mcg tablet   No No   Sig: Take 1 5 tablets (37 5 mcg total) by mouth in the morning     lisinopril (ZESTRIL) 20 mg tablet  Self No No   Sig: Take 1 tablet (20 mg total) by mouth daily   meclizine (ANTIVERT) 25 mg tablet  Self No No   Sig: Take 1 tablet (25 mg total) by mouth 4 (four) times a day as needed for dizziness   metFORMIN (GLUCOPHAGE) 1000 MG tablet  Self No No   Sig: Take 1 tablet (1,000 mg total) by mouth 2 (two) times a day with meals   metoclopramide (REGLAN) 10 mg tablet  Self No No   Sig: Take 1 tablet (10 mg total) by mouth 4 (four) times a day   metoprolol tartrate (LOPRESSOR) 25 mg tablet   No No   Sig: Take 0 5 tablets (12 5 mg total) by mouth every 12 (twelve) hours   ondansetron (ZOFRAN) 8 mg tablet  Self No No   Sig: Take 1 tablet (8 mg total) by mouth every 8 (eight) hours as needed for nausea or vomiting   oxyCODONE-acetaminophen (PERCOCET)  mg per tablet   No No   Sig: Take 1 tablet by mouth every 6 (six) hours as needed for severe pain Max Daily Amount: 4 tablets   pantoprazole (PROTONIX) 40 mg tablet   No No   Sig: Take 1 tablet (40 mg total) by mouth daily   phenazopyridine (PYRIDIUM) 100 mg tablet  Self No No   Sig: Take 1 tablet (100 mg total) by mouth 3 (three) times a day as needed for bladder spasms   trospium chloride (SANCTURA) 20 mg tablet   No No   Sig: Take 1 tablet (20 mg total) by mouth 2 (two) times a day   zolpidem (AMBIEN) 5 mg tablet   No No   Sig: Take 1 tablet (5 mg total) by mouth daily at bedtime as needed for sleep      Facility-Administered Medications: None       Past Medical History:   Diagnosis Date    Acid reflux     Anemia     hx of iron-deficient    Anxiety     Arthritis     Asthma     last needed inhaler last year 2020    Basal cell carcinoma     upper lip    Chronic narcotic dependence (HCC)     Chronic pain     Colon polyp     Cystocele     Diabetes mellitus ([address] )     stable    Disease of thyroid gland     hypothyroidism    Diverticulosis     Dysfunctional uterine bleeding     last assessed - [id number]    Fibromyalgia     Gastric ulcer     Gastroparesis     History of colonic polyps     last assessed - [id number]    History of gastroesophageal reflux (GERD)     Hypercholesterolemia     Hyperlipidemia     Hypertension     IBS (irritable bowel syndrome)     Post laminectomy syndrome     Seasonal allergies     Spinal stenosis        Past Surgical History:   Procedure Laterality Date    APPENDECTOMY      BACK SURGERY      BREAST CYST EXCISION Left     CHOLECYSTECTOMY      COLONOSCOPY      ESOPHAGOGASTRODUODENOSCOPY N/A 09/28/2016    Procedure: ESOPHAGOGASTRODUODENOSCOPY (EGD); Surgeon: Kindra Herbert MD;  [facility]; Service:     HERNIA REPAIR      HYSTERECTOMY      TTAH-BSO age 25   Emaline Folds LAMINECTOMY      LUMBAR LAMINECTOMY      OOPHORECTOMY      age 25   Emaline Folds AR ARTHRODESIS POSTERIOR/POSTEROLATERAL THORACIC N/A 06/04/2018    Procedure: Reopening of lumbar incision for T12-L5 posterior instrumented fixation and fusion and T12-L4 posterior decompression;  Surgeon: Akiko Herrera MD;  [facility];  Service: Neurosurgery    AR COLONOSCOPY FLX DX W/COLLJ SPEC WHEN PFRMD N/A 03/02/2016    Procedure: EGD AND COLONOSCOPY;  Surgeon: Kindra Herbert MD;  [facility]; Service: Gastroenterology    AR DILATE ESOPHAGUS N/A 09/28/2016    Procedure: DILATATION ESOPHAGEAL;  Surgeon: Kindra Herbert MD;  [facility];   Service: Gastroenterology    HI ESOPHAGOGASTRODUODENOSCOPY TRANSORAL DIAGNOSTIC N/A 2016    Procedure: ESOPHAGOGASTRODUODENOSCOPY (EGD); Surgeon: Dany Gama MD;  [facility]; Service: Gastroenterology    HI IMPLANT SPINAL NEUROSTIM/ Left 2018    Procedure: removal of left buttock implantable pulse generator and placement of new  implantable pulse generator;  Surgeon: Merlyn Olszewski, MD;  [facility];  Service: Neurosurgery       Family History   Problem Relation Age of Onset    Lung cancer Mother 55    Pulmonary embolism Father     No Known Problems Sister     No Known Problems Daughter     No Known Problems Daughter     Stroke Maternal Grandmother     Heart attack Maternal Grandfather     No Known Problems Paternal Grandmother     No Known Problems Paternal Grandfather     No Known Problems Maternal Aunt     Diabetes Family         Diabetes mellitus    Hypertension Family     Stroke Family         Stroke complications     I have reviewed and agree with the history as documented  E-Cigarette/Vaping    E-Cigarette Use Never User      E-Cigarette/Vaping Substances    Nicotine No     THC No     CBD No     Flavoring No     Other No     Unknown No      Social History     Tobacco Use    Smoking status: Former Smoker     Types: Cigarettes     Quit date: 1970     Years since quittin 4    Smokeless tobacco: Never Used    Tobacco comment: Denied history of current ever day smoker, Former smoker and Never smoker all documented in [employer]   Vaping Use    Vaping Use: Never used   Substance Use Topics    Alcohol use: Not Currently     Comment: Denied history of alcohol use    Drug use: No     Comment: Denied history of drug use       Review of Systems   Constitutional: Negative for fever  Gastrointestinal: Positive for abdominal pain, diarrhea, nausea and vomiting  Genitourinary: Negative for difficulty urinating     All other systems reviewed and are negative  Physical Exam  Physical Exam  Vitals and nursing note reviewed  Constitutional:       General: She is in acute distress (moderate)  HENT:      Head: Normocephalic and atraumatic  Mouth/Throat:      Mouth: Mucous membranes are dry  Eyes:      General: No scleral icterus  Cardiovascular:      Rate and Rhythm: Regular rhythm  Tachycardia present  Heart sounds: Normal heart sounds  No murmur heard  Pulmonary:      Effort: Pulmonary effort is normal  No respiratory distress  Breath sounds: Normal breath sounds  Abdominal:      General: Bowel sounds are normal  There is no distension  Palpations: Abdomen is soft  Tenderness: There is abdominal tenderness (diffuse)  There is no guarding or rebound  Musculoskeletal:         General: No deformity  Cervical back: Normal range of motion and neck supple  Right lower leg: No edema  Left lower leg: No edema  Skin:     General: Skin is warm and dry  Coloration: Skin is not jaundiced  Findings: No erythema or rash  Neurological:      Mental Status: She is alert and oriented to person, place, and time     Psychiatric:         Mood and Affect: Mood normal          Vital Signs  ED Triage Vitals   Temperature Pulse Respirations Blood Pressure SpO2   06/18/22 0022 06/18/22 0022 06/18/22 0022 06/18/22 0022 06/18/22 0022   98 5 °F (36 9 °C) (!) 120 18 159/79 98 %      Temp Source Heart Rate Source Patient Position - Orthostatic VS BP Location FiO2 (%)   06/18/22 0022 06/18/22 0022 06/18/22 0022 06/18/22 0022 --   Oral Monitor Lying Right arm       Pain Score       06/18/22 0046       9           Vitals:    06/18/22 0022 06/18/22 0222   BP: 159/79 (!) 177/81   Pulse: (!) 120 (!) 106   Patient Position - Orthostatic VS: Lying          Visual Acuity      ED Medications  Medications   sodium chloride 0 9 % infusion (125 mL/hr Intravenous New Bag 6/18/22 0304)   aztreonam (AZACTAM) 1,000 mg in sodium chloride 0 9 % 50 mL IVPB (has no administration in time range)   ondansetron (FOR EMS ONLY) (ZOFRAN) 4 mg/2 mL injection 4 mg (0 mg Does not apply Given to EMS 6/18/22 0026)   sodium chloride 0 9 % bolus 500 mL (0 mL Intravenous Stopped 6/18/22 0200)   ondansetron (ZOFRAN) injection 4 mg (4 mg Intravenous Given 6/18/22 0046)   Famotidine (PF) (PEPCID) injection 20 mg (20 mg Intravenous Given 6/18/22 0046)   HYDROmorphone (DILAUDID) injection 0 5 mg (0 5 mg Intravenous Given 6/18/22 0046)   levofloxacin (LEVAQUIN) IVPB (premix in dextrose) 750 mg 150 mL (0 mg Intravenous Stopped 6/18/22 0258)   metroNIDAZOLE (FLAGYL) IVPB (premix) 500 mg 100 mL (0 mg Intravenous Stopped 6/18/22 0201)   sodium chloride 0 9 % bolus 500 mL (0 mL Intravenous Stopped 6/18/22 0258)   promethazine (PHENERGAN) injection 12 5 mg (12 5 mg Intravenous Given 6/18/22 0202)   diphenhydrAMINE (BENADRYL) injection 25 mg (25 mg Intravenous Given 6/18/22 0303)       Diagnostic Studies  Results Reviewed     Procedure Component Value Units Date/Time    Lactic acid 2 Hours [[id number]]  (Normal) Collected: 06/18/22 0256    Lab Status: Final result Specimen: Blood from Arm, Left Updated: 06/18/22 0321     LACTIC ACID 1 5 mmol/L     Narrative:      Result may be elevated if tourniquet was used during collection  Lactic acid [[id number]]  (Abnormal) Collected: 06/18/22 0042    Lab Status: Final result Specimen: Blood from Arm, Left Updated: 06/18/22 0129     LACTIC ACID 2 9 mmol/L     Narrative:      Result may be elevated if tourniquet was used during collection      Comprehensive metabolic panel [[id number]]  (Abnormal) Collected: 06/18/22 0042    Lab Status: Final result Specimen: Blood from Arm, Left Updated: 06/18/22 0125     Sodium 137 mmol/L      Potassium 3 8 mmol/L      Chloride 104 mmol/L      CO2 23 mmol/L      ANION GAP 10 mmol/L      BUN 18 mg/dL      Creatinine 1 36 mg/dL      Glucose 225 mg/dL      Calcium 8 7 mg/dL      Corrected Calcium 9 2 mg/dL      AST 12 U/L      ALT 8 U/L      Alkaline Phosphatase 139 U/L      Total Protein 6 2 g/dL      Albumin 3 4 g/dL      Total Bilirubin 0 29 mg/dL      eGFR 37 ml/min/1 73sq m     Narrative:      [place] guidelines for Chronic Kidney Disease (CKD):     Stage 1 with normal or high GFR (GFR > 90 mL/min/1 73 square meters)    Stage 2 Mild CKD (GFR = 60-89 mL/min/1 73 square meters)    Stage 3A Moderate CKD (GFR = 45-59 mL/min/1 73 square meters)    Stage 3B Moderate CKD (GFR = 30-44 mL/min/1 73 square meters)    Stage 4 Severe CKD (GFR = 15-29 mL/min/1 73 square meters)    Stage 5 End Stage CKD (GFR <15 mL/min/1 73 square meters)  Note: GFR calculation is accurate only with a steady state creatinine    Lipase [[id number]]  (Normal) Collected: 06/18/22 0042    Lab Status: Final result Specimen: Blood from Arm, Left Updated: 06/18/22 0125     Lipase 16 u/L     Protime-INR [[id number]]  (Abnormal) Collected: 06/18/22 0042    Lab Status: Final result Specimen: Blood from Arm, Left Updated: 06/18/22 0124     Protime 15 6 seconds      INR 1 25    APTT [[id number]]  (Normal) Collected: 06/18/22 0042    Lab Status: Final result Specimen: Blood from Arm, Left Updated: 06/18/22 0124     PTT 24 seconds     CBC and differential [[id number]]  (Abnormal) Collected: 06/18/22 0042    Lab Status: Final result Specimen: Blood from Arm, Left Updated: 06/18/22 0102     WBC 16 90 Thousand/uL      RBC 5 12 Million/uL      Hemoglobin 13 7 g/dL      Hematocrit 44 3 %      MCV 87 fL      MCH 26 8 pg      MCHC 30 9 g/dL      RDW 14 8 %      MPV 11 2 fL      Platelets 423 Thousands/uL      nRBC 0 /100 WBCs      Neutrophils Relative 76 %      Immat GRANS % 1 %      Lymphocytes Relative 9 %      Monocytes Relative 7 %      Eosinophils Relative 7 %      Basophils Relative 0 %      Neutrophils Absolute 12 87 Thousands/µL      Immature Grans Absolute 0 16 Thousand/uL      Lymphocytes Absolute 1 54 Thousands/µL      Monocytes Absolute 1 12 Thousand/µL      Eosinophils Absolute 1 15 Thousand/µL      Basophils Absolute 0 06 Thousands/µL     Blood culture #2 [[id number]] Collected: 06/18/22 0042    Lab Status: In process Specimen: Blood from Arm, Right Updated: 06/18/22 0101    Blood culture #1 [[id number]] Collected: 06/18/22 0042    Lab Status: In process Specimen: Blood from Arm, Left Updated: 06/18/22 0101    UA w Reflex to Microscopic w Reflex to Culture [[id number]]     Lab Status: No result Specimen: Urine, Clean Catch     Stool Enteric Bacterial Panel by PCR [[id number]]     Lab Status: No result Specimen: Stool from Rectum     Clostridium difficile toxin by PCR with EIA [[id number]]     Lab Status: No result Specimen: Stool from Per Rectum                  XR chest 1 view portable   ED Interpretation by Annie Rodriguez MD (06/18 2252)   Elevated R hemidiaphragm read by me  CT abdomen pelvis wo contrast   ED Interpretation by Annie Rodriguez MD (06/18 4655)   FINDINGS:     ABDOMEN     LOWER CHEST:  No clinically significant abnormality identified in the visualized lower chest      LIVER/BILIARY TREE:  Subtle pneumobilia in the left lobe of the liver not seen on prior exams  Differential is broad; has the patient had a procedure? Recommend correlation for evaluation of cholangitis        GALLBLADDER:  Gallbladder is surgically absent      SPLEEN:  Unremarkable      PANCREAS:  Unremarkable      ADRENAL GLANDS:  Unremarkable      KIDNEYS/URETERS:  Unremarkable  No hydronephrosis      STOMACH AND BOWEL:  There is colonic diverticulosis without evidence of acute diverticulitis      APPENDIX:  No findings to suggest appendicitis      ABDOMINOPELVIC CAVITY:  No ascites  No pneumoperitoneum    No lymphadenopathy      VESSELS:  Unremarkable for patient's age      PELVIS     REPRODUCTIVE ORGANS:  Unremarkable for patient's age      URINARY BLADDER:  Unremarkable      ABDOMINAL WALL/INGUINAL REGIONS:  Postsurgical changes in the anterior abdominal wal   l      OSSEOUS STRUCTURES:  No acute fracture or destructive osseous lesion  Postsurgical changes lumbar spine      IMPRESSION:     Subtle pneumobilia in the left lobe of the liver not seen on prior exams  Differential is broad; has the patient had a procedure? Recommend correlation for evaluation of cholangitis              I personally discussed this study with [address] [place] [address] on 6/18/2022 at 2:25 AM                     Workstation performed: [id number]      Final Result by Hesham Munoz MD (06/18 0225)      Subtle pneumobilia in the left lobe of the liver not seen on prior exams  Differential is broad; has the patient had a procedure? Recommend correlation for evaluation of cholangitis                I personally discussed this study with [address] [place] [address] on 6/18/2022 at 2:25 AM                      Workstation performed: [id number]                    Procedures  ECG 12 Lead Documentation Only    Date/Time: 6/18/2022 2:09 AM  Performed by: Sharad Miller MD  Authorized by: Sharad Miller MD     Indications / Diagnosis:  Abdominal pain  ECG reviewed by me, the ED Provider: yes    Patient location:  ED  Previous ECG:     Previous ECG:  Compared to current    Comparison ECG info:  4/22/22    Similarity:  Changes noted (s  tachy now)  Rate:     ECG rate:  117    ECG rate assessment: tachycardic    Rhythm:     Rhythm: sinus tachycardia    Ectopy:     Ectopy: none    QRS:     QRS axis:  Right  Conduction:     Conduction: abnormal      Abnormal conduction: complete LBBB               ED Course  ED Course as of 06/18/22 0330   Sat Jun 18, 2022   0131 Sepsis alert called by me  0132 WBC(!): 16 90  IV abx ordered  0133 LACTIC ACID(!!): 2 9  IVFs and IV abx ordered  0133 Creatinine(!): 1 36  IVFs ordered  0143 Labs and CXR d/w patient  Patient still with nausea so IV phenergan ordered      0259 IV infiltrated while levaquin infusing and erythema of left antecubital fossa and patient with pruritis so IV benadryl ordered  Levaquin added to allergy list by me     0300 CT d/w patient  Initial Sepsis Screening     Row Name 06/18/22 0131                Is the patient's history suggestive of a new or worsening infection? Yes (Proceed)  -AO        Suspected source of infection acute abdominal infection  -AO        Are two or more of the following signs & symptoms of infection both present and new to the patient? --        Indicate SIRS criteria Leukocytosis (WBC > 07424 IJL); Tachycardia > 90 bpm  -AO        If the answer is yes to both questions, suspicion of sepsis is present --        If severe sepsis is present AND tissue hypoperfusion perists in the hour after fluid resuscitation or lactate > 4, the patient meets criteria for SEPTIC SHOCK --        Are any of the following organ dysfunction criteria present within 6 hours of suspected infection and SIRS criteria that are NOT considered to be chronic conditions?  Yes  -AO        Organ dysfunction Lactate > 2 0 mmol/L  -AO        Date of presentation of severe sepsis 06/18/22  -AO        Time of presentation of severe sepsis 0131  -AO        Tissue hypoperfusion persists in the hour after crystalloid fluid administration, evidenced, by either: --        Was hypotension present within one hour of the conclusion of crystalloid fluid administration? --        Date of presentation of septic shock --        Time of presentation of septic shock --              User Key  (r) = Recorded By, (t) = Taken By, (c) = Cosigned By    [address] Name Provider Nirali Morris MD Physician              Default Flowsheet Data (last 720 hours)     Sepsis Reassess     [address] Name 06/18/22 0325                   Repeat Volume Status and Tissue Perfusion Assessment Performed    Repeat Volume Status and Tissue Perfusion Assessment Performed --                  Volume Status and Tissue Perfusion Post Fluid Resuscitation * Must Document All *    Vital Signs Reviewed (HR, RR, BP, T) Yes  -AO        Shock Index Reviewed --        Arterial Oxygen Saturation Reviewed (POx, SaO2 or SpO2) Yes (comment %)  95%  -AO        Cardio Tachycardia  -AO        Pulmonary Normal effort;Clear to auscultation  -AO        Capillary Refill Brisk  -AO        Peripheral Pulses Radial  -AO        Peripheral Pulse +3  -AO        Skin Warm;Dry  -AO        Urine output assessed None  -AO                  *OR*   Intensive Monitoring- Must Document One of the Following Four *:    Vital Signs Reviewed --        * Central Venous Pressure (CVP or RAP) --        * Central Venous Oxygen (SVO2, ScvO2 or Oxygen saturation via central catheter) --        * Bedside Cardiovascular US in IVC diameter and % collapse --        * Passive Leg Raise OR Crystalloid Challenge --              User Key  (r) = Recorded By, (t) = Taken By, (c) = Cosigned By    [address] Name Provider Leoncio Veloz MD Physician                            MDM  Number of Diagnoses or Management Options  Diagnosis management comments: DDx including but not limited to: gastroenteritis, gastritis, PUD, GERD, gastroparesis, hepatitis, pancreatitis, colitis, enteritis, diverticulitis, food poisoning, mesenteric adenitis, epiploic appendagitis, mesenteric panniculitis, mesenteric ischemia, IBD, IBS, ileus, bowel obstruction, volvulus, internal hernia, AAA, choledocholithiasis, perforated viscus, tumor, splenic etiology, constipation, renal colic, pyelonephritis, UTI; doubt cardiac etiology          Amount and/or Complexity of Data Reviewed  Clinical lab tests: ordered and reviewed  Tests in the radiology section of CPT®: ordered and reviewed  Decide to obtain previous medical records or to obtain history from someone other than the patient: yes  Review and summarize past medical records: yes  Discuss the patient with other providers: yes  Independent visualization of images, tracings, or specimens: yes        Disposition  Final diagnoses:   Abdominal pain   Nausea vomiting and diarrhea   Dehydration   Elevated serum creatinine   Severe sepsis ([facility] [address] )   Pneumobilia     Time reflects when diagnosis was documented in both MDM as applicable and the Disposition within this note     Time User Action Codes Description Comment    6/18/2022  1:25 AM Jamul Backer Add [R10 9] Abdominal pain     6/18/2022  1:25 AM Jamul Backer Add [R11 2,  R19 7] Nausea vomiting and diarrhea     6/18/2022  1:25 AM Jamul Backer Add [E86 0] Dehydration     6/18/2022  1:26 AM Jamul Backer Add [R79 89] Elevated serum creatinine     6/18/2022  1:46 AM Jamul Backer Add [A41 9,  R65 20] Severe sepsis ([facility] [address] )     6/18/2022  1:47 AM Jamul Backer Modify [R10 9] Abdominal pain     6/18/2022  1:47 AM Jamul Backer Modify [A41 9,  R65 20] Severe sepsis ([facility] [address] )     6/18/2022  3:25 AM Jamul Backer Add [K83 8] Pneumobilia       ED Disposition     ED Disposition   Admit    Condition   Stable    Date/Time   Sat Jun 18, 2022  3:29 AM    Comment   Case was discussed with [facility] resident and the patient's admission status was agreed to be Admission Status: inpatient status to the service of Dr Jeniffer Donahue   Follow-up Information    None         Patient's Medications   Discharge Prescriptions    No medications on file       No discharge procedures on file      PDMP Review       Value Time User    PDMP Reviewed  Yes 6/18/2022 12:22 Benjamin Bowen MD          ED Provider  Electronically Signed by           Marika Fonseca MD  06/18/22 6043

## 2022-06-18 NOTE — PLAN OF CARE
Problem: MOBILITY - ADULT  Goal: Maintain or return to baseline ADL function  Description: INTERVENTIONS:  -  Assess patient's ability to carry out ADLs; assess patient's baseline for ADL function and identify physical deficits which impact ability to perform ADLs (bathing, care of mouth/teeth, toileting, grooming, dressing, etc )  - Assess/evaluate cause of self-care deficits   - Assess range of motion  - Assess patient's mobility; develop plan if impaired  - Assess patient's need for assistive devices and provide as appropriate  - Encourage maximum independence but intervene and supervise when necessary  - Involve family in performance of ADLs  - Assess for home care needs following discharge   - Consider OT consult to assist with ADL evaluation and planning for discharge  - Provide patient education as appropriate  Outcome: Progressing  Goal: Maintains/Returns to pre admission functional level  Description: INTERVENTIONS:  - Perform BMAT or MOVE assessment daily    - Set and communicate daily mobility goal to care team and patient/family/caregiver  - Collaborate with rehabilitation services on mobility goals if consulted  - Perform Range of Motion  times a day  - Reposition patient every  hours    - Dangle patient  times a day  - Stand patient  times a day  - Ambulate patient  times a day  - Out of bed to chair  times a day   - Out of bed for meals  times a day  - Out of bed for toileting  - Record patient progress and toleration of activity level   Outcome: Progressing     Problem: Potential for Falls  Goal: Patient will remain free of falls  Description: INTERVENTIONS:  - Educate patient/family on patient safety including physical limitations  - Instruct patient to call for assistance with activity   - Consult OT/PT to assist with strengthening/mobility   - Keep Call bell within reach  - Keep bed low and locked with side rails adjusted as appropriate  - Keep care items and personal belongings within reach  - Initiate and maintain comfort rounds  - Make Fall Risk Sign visible to staff  - Offer Toileting every  Hours, in advance of need  - Initiate/Maintain alarm  - Obtain necessary fall risk management equipment:   - Apply yellow socks and bracelet for high fall risk patients  - Consider moving patient to room near nurses station  Outcome: Progressing     Problem: Prexisting or High Potential for Compromised Skin Integrity  Goal: Skin integrity is maintained or improved  Description: INTERVENTIONS:  - Identify patients at risk for skin breakdown  - Assess and monitor skin integrity  - Assess and monitor nutrition and hydration status  - Monitor labs   - Assess for incontinence   - Turn and reposition patient  - Assist with mobility/ambulation  - Relieve pressure over bony prominences  - Avoid friction and shearing  - Provide appropriate hygiene as needed including keeping skin clean and dry  - Evaluate need for skin moisturizer/barrier cream  - Collaborate with interdisciplinary team   - Patient/family teaching  - Consider wound care consult   Outcome: Progressing     Problem: PAIN - ADULT  Goal: Verbalizes/displays adequate comfort level or baseline comfort level  Description: Interventions:  - Encourage patient to monitor pain and request assistance  - Assess pain using appropriate pain scale  - Administer analgesics based on type and severity of pain and evaluate response  - Implement non-pharmacological measures as appropriate and evaluate response  - Consider cultural and social influences on pain and pain management  - Notify physician/advanced practitioner if interventions unsuccessful or patient reports new pain  Outcome: Progressing     Problem: INFECTION - ADULT  Goal: Absence or prevention of progression during hospitalization  Description: INTERVENTIONS:  - Assess and monitor for signs and symptoms of infection  - Monitor lab/diagnostic results  - Monitor all insertion sites, i e  indwelling lines, tubes, and drains  - Monitor endotracheal if appropriate and nasal secretions for changes in amount and color  - [place] appropriate cooling/warming therapies per order  - Administer medications as ordered  - Instruct and encourage patient and family to use good hand hygiene technique  - Identify and instruct in appropriate isolation precautions for identified infection/condition  Outcome: Progressing     Problem: SAFETY ADULT  Goal: Maintain or return to baseline ADL function  Description: INTERVENTIONS:  -  Assess patient's ability to carry out ADLs; assess patient's baseline for ADL function and identify physical deficits which impact ability to perform ADLs (bathing, care of mouth/teeth, toileting, grooming, dressing, etc )  - Assess/evaluate cause of self-care deficits   - Assess range of motion  - Assess patient's mobility; develop plan if impaired  - Assess patient's need for assistive devices and provide as appropriate  - Encourage maximum independence but intervene and supervise when necessary  - Involve family in performance of ADLs  - Assess for home care needs following discharge   - Consider OT consult to assist with ADL evaluation and planning for discharge  - Provide patient education as appropriate  Outcome: Progressing  Goal: Maintains/Returns to pre admission functional level  Description: INTERVENTIONS:  - Perform BMAT or MOVE assessment daily    - Set and communicate daily mobility goal to care team and patient/family/caregiver  - Collaborate with rehabilitation services on mobility goals if consulted  - Perform Range of Motion  times a day  - Reposition patient every  hours    - Dangle patient  times a day  - Stand patient  times a day  - Ambulate patient  times a day  - Out of bed to chair  times a day   - Out of bed for meals  times a day  - Out of bed for toileting  - Record patient progress and toleration of activity level   Outcome: Progressing  Goal: Patient will remain free of falls  Description: INTERVENTIONS:  - Educate patient/family on patient safety including physical limitations  - Instruct patient to call for assistance with activity   - Consult OT/PT to assist with strengthening/mobility   - Keep Call bell within reach  - Keep bed low and locked with side rails adjusted as appropriate  - Keep care items and personal belongings within reach  - Initiate and maintain comfort rounds  - Make Fall Risk Sign visible to staff  - Offer Toileting every  Hours, in advance of need  - Initiate/Maintain alarm  - Obtain necessary fall risk management equipment  - Apply yellow socks and bracelet for high fall risk patients  - Consider moving patient to room near nurses station  Outcome: Progressing     Problem: DISCHARGE PLANNING  Goal: Discharge to home or other facility with appropriate resources  Description: INTERVENTIONS:  - Identify barriers to discharge w/patient and caregiver  - Arrange for needed discharge resources and transportation as appropriate  - Identify discharge learning needs (meds, wound care, etc )  - Arrange for interpretive services to assist at discharge as needed  - Refer to Case Management Department for coordinating discharge planning if the patient needs post-hospital services based on physician/advanced practitioner order or complex needs related to functional status, cognitive ability, or social support system  Outcome: Progressing     Problem: GASTROINTESTINAL - ADULT  Goal: Minimal or absence of nausea and/or vomiting  Description: INTERVENTIONS:  - Administer IV fluids if ordered to ensure adequate hydration  - Maintain NPO status until nausea and vomiting are resolved  - Nasogastric tube if ordered  - Administer ordered antiemetic medications as needed  - Provide nonpharmacologic comfort measures as appropriate  - Advance diet as tolerated, if ordered  - Consider nutrition services referral to assist patient with adequate nutrition and appropriate food choices  Outcome: Progressing  Goal: Maintains or returns to baseline bowel function  Description: INTERVENTIONS:  - Assess bowel function  - Encourage oral fluids to ensure adequate hydration  - Administer IV fluids if ordered to ensure adequate hydration  - Administer ordered medications as needed  - Encourage mobilization and activity  - Consider nutritional services referral to assist patient with adequate nutrition and appropriate food choices  Outcome: Progressing  Goal: Maintains adequate nutritional intake  Description: INTERVENTIONS:  - Monitor percentage of each meal consumed  - Identify factors contributing to decreased intake, treat as appropriate  - Assist with meals as needed  - Monitor I&O, weight, and lab values if indicated  - Obtain nutrition services referral as needed  Outcome: Progressing

## 2022-06-19 LAB
ANION GAP SERPL CALCULATED.3IONS-SCNC: 7 MMOL/L (ref 4–13)
BASOPHILS # BLD AUTO: 0.04 THOUSANDS/ΜL (ref 0–0.1)
BASOPHILS NFR BLD AUTO: 0 % (ref 0–1)
BUN SERPL-MCNC: 15 MG/DL (ref 5–25)
C DIFF TOX GENS STL QL NAA+PROBE: NEGATIVE
CALCIUM SERPL-MCNC: 8.8 MG/DL (ref 8.4–10.2)
CHLORIDE SERPL-SCNC: 113 MMOL/L (ref 96–108)
CO2 SERPL-SCNC: 20 MMOL/L (ref 21–32)
CREAT SERPL-MCNC: 1.17 MG/DL (ref 0.6–1.3)
EOSINOPHIL # BLD AUTO: 2.34 THOUSAND/ΜL (ref 0–0.61)
EOSINOPHIL NFR BLD AUTO: 18 % (ref 0–6)
ERYTHROCYTE [DISTWIDTH] IN BLOOD BY AUTOMATED COUNT: 15 % (ref 11.6–15.1)
GFR SERPL CREATININE-BSD FRML MDRD: 44 ML/MIN/1.73SQ M
GGT SERPL-CCNC: 9 U/L (ref 5–85)
GLUCOSE SERPL-MCNC: 115 MG/DL (ref 65–140)
GLUCOSE SERPL-MCNC: 115 MG/DL (ref 65–140)
GLUCOSE SERPL-MCNC: 127 MG/DL (ref 65–140)
GLUCOSE SERPL-MCNC: 142 MG/DL (ref 65–140)
GLUCOSE SERPL-MCNC: 175 MG/DL (ref 65–140)
HCT VFR BLD AUTO: 35.4 % (ref 34.8–46.1)
HGB BLD-MCNC: 10.8 G/DL (ref 11.5–15.4)
IMM GRANULOCYTES # BLD AUTO: 0.17 THOUSAND/UL (ref 0–0.2)
IMM GRANULOCYTES NFR BLD AUTO: 1 % (ref 0–2)
LYMPHOCYTES # BLD AUTO: 2.21 THOUSANDS/ΜL (ref 0.6–4.47)
LYMPHOCYTES NFR BLD AUTO: 17 % (ref 14–44)
MAGNESIUM SERPL-MCNC: 1.1 MG/DL (ref 1.9–2.7)
MCH RBC QN AUTO: 26.6 PG (ref 26.8–34.3)
MCHC RBC AUTO-ENTMCNC: 30.2 G/DL (ref 31.4–37.4)
MCV RBC AUTO: 88 FL (ref 82–98)
MONOCYTES # BLD AUTO: 0.91 THOUSAND/ΜL (ref 0.17–1.22)
MONOCYTES NFR BLD AUTO: 7 % (ref 4–12)
NEUTROPHILS # BLD AUTO: 7.63 THOUSANDS/ΜL (ref 1.85–7.62)
NEUTS SEG NFR BLD AUTO: 57 % (ref 43–75)
NRBC BLD AUTO-RTO: 0 /100 WBCS
PLATELET # BLD AUTO: 253 THOUSANDS/UL (ref 149–390)
PMV BLD AUTO: 10.8 FL (ref 8.9–12.7)
POTASSIUM SERPL-SCNC: 4.3 MMOL/L (ref 3.5–5.3)
POTASSIUM SERPL-SCNC: 4.3 MMOL/L (ref 3.5–5.3)
PROCALCITONIN SERPL-MCNC: 0.06 NG/ML
RBC # BLD AUTO: 4.02 MILLION/UL (ref 3.81–5.12)
SODIUM SERPL-SCNC: 140 MMOL/L (ref 135–147)
WBC # BLD AUTO: 13.3 THOUSAND/UL (ref 4.31–10.16)

## 2022-06-19 PROCEDURE — 84145 PROCALCITONIN (PCT): CPT | Performed by: INTERNAL MEDICINE

## 2022-06-19 PROCEDURE — 99232 SBSQ HOSP IP/OBS MODERATE 35: CPT | Performed by: INTERNAL MEDICINE

## 2022-06-19 PROCEDURE — 99231 SBSQ HOSP IP/OBS SF/LOW 25: CPT | Performed by: SURGERY

## 2022-06-19 PROCEDURE — 82948 REAGENT STRIP/BLOOD GLUCOSE: CPT

## 2022-06-19 RX ORDER — MAGNESIUM SULFATE HEPTAHYDRATE 40 MG/ML
4 INJECTION, SOLUTION INTRAVENOUS ONCE
Status: COMPLETED | OUTPATIENT
Start: 2022-06-19 | End: 2022-06-19

## 2022-06-19 RX ORDER — SACCHAROMYCES BOULARDII 250 MG
250 CAPSULE ORAL 2 TIMES DAILY
Status: DISCONTINUED | OUTPATIENT
Start: 2022-06-19 | End: 2022-06-21 | Stop reason: HOSPADM

## 2022-06-19 RX ADMIN — OXYCODONE HYDROCHLORIDE AND ACETAMINOPHEN 1 TABLET: 5; 325 TABLET ORAL at 09:42

## 2022-06-19 RX ADMIN — LEVOTHYROXINE SODIUM 37.5 MCG: 75 TABLET ORAL at 05:14

## 2022-06-19 RX ADMIN — ASPIRIN 81 MG CHEWABLE TABLET 81 MG: 81 TABLET CHEWABLE at 08:41

## 2022-06-19 RX ADMIN — HEPARIN SODIUM 5000 UNITS: 5000 INJECTION INTRAVENOUS; SUBCUTANEOUS at 22:11

## 2022-06-19 RX ADMIN — METOPROLOL TARTRATE 12.5 MG: 25 TABLET, FILM COATED ORAL at 08:41

## 2022-06-19 RX ADMIN — HEPARIN SODIUM 5000 UNITS: 5000 INJECTION INTRAVENOUS; SUBCUTANEOUS at 05:15

## 2022-06-19 RX ADMIN — Medication 250 MG: at 17:45

## 2022-06-19 RX ADMIN — OXYBUTYNIN CHLORIDE 5 MG: 5 TABLET ORAL at 08:41

## 2022-06-19 RX ADMIN — ESCITALOPRAM OXALATE 10 MG: 10 TABLET ORAL at 08:41

## 2022-06-19 RX ADMIN — OXYCODONE HYDROCHLORIDE AND ACETAMINOPHEN 1 TABLET: 5; 325 TABLET ORAL at 22:13

## 2022-06-19 RX ADMIN — GABAPENTIN 400 MG: 400 CAPSULE ORAL at 22:11

## 2022-06-19 RX ADMIN — CEFTRIAXONE 1000 MG: 1 INJECTION, POWDER, FOR SOLUTION INTRAMUSCULAR; INTRAVENOUS at 04:11

## 2022-06-19 RX ADMIN — OXYBUTYNIN CHLORIDE 5 MG: 5 TABLET ORAL at 17:45

## 2022-06-19 RX ADMIN — OXYCODONE HYDROCHLORIDE AND ACETAMINOPHEN 1 TABLET: 5; 325 TABLET ORAL at 03:37

## 2022-06-19 RX ADMIN — SODIUM CHLORIDE 100 ML/HR: 0.9 INJECTION, SOLUTION INTRAVENOUS at 03:22

## 2022-06-19 RX ADMIN — ATORVASTATIN CALCIUM 40 MG: 40 TABLET, FILM COATED ORAL at 08:41

## 2022-06-19 RX ADMIN — Medication 250 MG: at 13:12

## 2022-06-19 RX ADMIN — PANTOPRAZOLE SODIUM 40 MG: 40 TABLET, DELAYED RELEASE ORAL at 08:41

## 2022-06-19 RX ADMIN — MAGNESIUM SULFATE HEPTAHYDRATE 4 G: 40 INJECTION, SOLUTION INTRAVENOUS at 04:41

## 2022-06-19 RX ADMIN — NYSTATIN: 100000 POWDER TOPICAL at 08:42

## 2022-06-19 RX ADMIN — METRONIDAZOLE 500 MG: 500 INJECTION, SOLUTION INTRAVENOUS at 18:00

## 2022-06-19 RX ADMIN — NYSTATIN 1 APPLICATION: 100000 POWDER TOPICAL at 18:00

## 2022-06-19 RX ADMIN — METRONIDAZOLE 500 MG: 500 INJECTION, SOLUTION INTRAVENOUS at 03:12

## 2022-06-19 RX ADMIN — HEPARIN SODIUM 5000 UNITS: 5000 INJECTION INTRAVENOUS; SUBCUTANEOUS at 13:12

## 2022-06-19 RX ADMIN — INSULIN LISPRO 1 UNITS: 100 INJECTION, SOLUTION INTRAVENOUS; SUBCUTANEOUS at 18:00

## 2022-06-19 RX ADMIN — METRONIDAZOLE 500 MG: 500 INJECTION, SOLUTION INTRAVENOUS at 10:44

## 2022-06-19 RX ADMIN — ZOLPIDEM TARTRATE 5 MG: 5 TABLET ORAL at 23:33

## 2022-06-19 RX ADMIN — METOPROLOL TARTRATE 12.5 MG: 25 TABLET, FILM COATED ORAL at 22:11

## 2022-06-19 RX ADMIN — ZOLPIDEM TARTRATE 5 MG: 5 TABLET ORAL at 00:14

## 2022-06-19 RX ADMIN — LATANOPROST 1 DROP: 50 SOLUTION OPHTHALMIC at 22:12

## 2022-06-19 RX ADMIN — POTASSIUM CHLORIDE 40 MEQ: 1500 TABLET, EXTENDED RELEASE ORAL at 03:12

## 2022-06-19 NOTE — PLAN OF CARE
Problem: MOBILITY - ADULT  Goal: Maintain or return to baseline ADL function  Description: INTERVENTIONS:  -  Assess patient's ability to carry out ADLs; assess patient's baseline for ADL function and identify physical deficits which impact ability to perform ADLs (bathing, care of mouth/teeth, toileting, grooming, dressing, etc )  - Assess/evaluate cause of self-care deficits   - Assess range of motion  - Assess patient's mobility; develop plan if impaired  - Assess patient's need for assistive devices and provide as appropriate  - Encourage maximum independence but intervene and supervise when necessary  - Involve family in performance of ADLs  - Assess for home care needs following discharge   - Consider OT consult to assist with ADL evaluation and planning for discharge  - Provide patient education as appropriate  Outcome: Progressing  Goal: Maintains/Returns to pre admission functional level  Description: INTERVENTIONS:  - Perform BMAT or MOVE assessment daily    - Set and communicate daily mobility goal to care team and patient/family/caregiver  - Collaborate with rehabilitation services on mobility goals if consulted  - Perform Range of Motion 4 times a day  - Reposition patient every 2 hours    - Dangle patient 3 times a day  - Stand patient 3 times a day  - Ambulate patient 3 times a day  - Out of bed to chair 3 times a day   - Out of bed for meals 3 times a day  - Out of bed for toileting  - Record patient progress and toleration of activity level   Outcome: Progressing     Problem: Potential for Falls  Goal: Patient will remain free of falls  Description: INTERVENTIONS:  - Educate patient/family on patient safety including physical limitations  - Instruct patient to call for assistance with activity   - Consult OT/PT to assist with strengthening/mobility   - Keep Call bell within reach  - Keep bed low and locked with side rails adjusted as appropriate  - Keep care items and personal belongings within reach  - Initiate and maintain comfort rounds  - Make Fall Risk Sign visible to staff  - Offer Toileting every 2 Hours, in advance of need  - Initiate/Maintain bed alarm  - Obtain necessary fall risk management equipment  - Apply yellow socks and bracelet for high fall risk patients  - Consider moving patient to room near nurses station  Outcome: Progressing     Problem: Prexisting or High Potential for Compromised Skin Integrity  Goal: Skin integrity is maintained or improved  Description: INTERVENTIONS:  - Identify patients at risk for skin breakdown  - Assess and monitor skin integrity  - Assess and monitor nutrition and hydration status  - Monitor labs   - Assess for incontinence   - Turn and reposition patient  - Assist with mobility/ambulation  - Relieve pressure over bony prominences  - Avoid friction and shearing  - Provide appropriate hygiene as needed including keeping skin clean and dry  - Evaluate need for skin moisturizer/barrier cream  - Collaborate with interdisciplinary team   - Patient/family teaching  - Consider wound care consult   Outcome: Progressing     Problem: PAIN - ADULT  Goal: Verbalizes/displays adequate comfort level or baseline comfort level  Description: Interventions:  - Encourage patient to monitor pain and request assistance  - Assess pain using appropriate pain scale  - Administer analgesics based on type and severity of pain and evaluate response  - Implement non-pharmacological measures as appropriate and evaluate response  - Consider cultural and social influences on pain and pain management  - Notify physician/advanced practitioner if interventions unsuccessful or patient reports new pain  Outcome: Progressing     Problem: INFECTION - ADULT  Goal: Absence or prevention of progression during hospitalization  Description: INTERVENTIONS:  - Assess and monitor for signs and symptoms of infection  - Monitor lab/diagnostic results  - Monitor all insertion sites, i e  indwelling lines, tubes, and drains  - Monitor endotracheal if appropriate and nasal secretions for changes in amount and color  - [place] appropriate cooling/warming therapies per order  - Administer medications as ordered  - Instruct and encourage patient and family to use good hand hygiene technique  - Identify and instruct in appropriate isolation precautions for identified infection/condition  Outcome: Progressing     Problem: SAFETY ADULT  Goal: Maintain or return to baseline ADL function  Description: INTERVENTIONS:  -  Assess patient's ability to carry out ADLs; assess patient's baseline for ADL function and identify physical deficits which impact ability to perform ADLs (bathing, care of mouth/teeth, toileting, grooming, dressing, etc )  - Assess/evaluate cause of self-care deficits   - Assess range of motion  - Assess patient's mobility; develop plan if impaired  - Assess patient's need for assistive devices and provide as appropriate  - Encourage maximum independence but intervene and supervise when necessary  - Involve family in performance of ADLs  - Assess for home care needs following discharge   - Consider OT consult to assist with ADL evaluation and planning for discharge  - Provide patient education as appropriate  Outcome: Progressing  Goal: Maintains/Returns to pre admission functional level  Description: INTERVENTIONS:  - Perform BMAT or MOVE assessment daily    - Set and communicate daily mobility goal to care team and patient/family/caregiver  - Collaborate with rehabilitation services on mobility goals if consulted  - Perform Range of Motion 4 times a day  - Reposition patient every 2 hours    - Dangle patient 3 times a day  - Stand patient 3 times a day  - Ambulate patient 3 times a day  - Out of bed to chair 3 times a day   - Out of bed for meals 3 times a day  - Out of bed for toileting  - Record patient progress and toleration of activity level   Outcome: Progressing  Goal: Patient will remain free of falls  Description: INTERVENTIONS:  - Educate patient/family on patient safety including physical limitations  - Instruct patient to call for assistance with activity   - Consult OT/PT to assist with strengthening/mobility   - Keep Call bell within reach  - Keep bed low and locked with side rails adjusted as appropriate  - Keep care items and personal belongings within reach  - Initiate and maintain comfort rounds  - Make Fall Risk Sign visible to staff  - Offer Toileting every 2 Hours, in advance of need  - Initiate/Maintain bed alarm  - Obtain necessary fall risk management equipment  - Apply yellow socks and bracelet for high fall risk patients  - Consider moving patient to room near nurses station  Outcome: Progressing     Problem: DISCHARGE PLANNING  Goal: Discharge to home or other facility with appropriate resources  Description: INTERVENTIONS:  - Identify barriers to discharge w/patient and caregiver  - Arrange for needed discharge resources and transportation as appropriate  - Identify discharge learning needs (meds, wound care, etc )  - Arrange for interpretive services to assist at discharge as needed  - Refer to Case Management Department for coordinating discharge planning if the patient needs post-hospital services based on physician/advanced practitioner order or complex needs related to functional status, cognitive ability, or social support system  Outcome: Progressing     Problem: GASTROINTESTINAL - ADULT  Goal: Minimal or absence of nausea and/or vomiting  Description: INTERVENTIONS:  - Administer IV fluids if ordered to ensure adequate hydration  - Maintain NPO status until nausea and vomiting are resolved  - Nasogastric tube if ordered  - Administer ordered antiemetic medications as needed  - Provide nonpharmacologic comfort measures as appropriate  - Advance diet as tolerated, if ordered  - Consider nutrition services referral to assist patient with adequate nutrition and appropriate food choices  Outcome: Progressing  Goal: Maintains or returns to baseline bowel function  Description: INTERVENTIONS:  - Assess bowel function  - Encourage oral fluids to ensure adequate hydration  - Administer IV fluids if ordered to ensure adequate hydration  - Administer ordered medications as needed  - Encourage mobilization and activity  - Consider nutritional services referral to assist patient with adequate nutrition and appropriate food choices  Outcome: Progressing  Goal: Maintains adequate nutritional intake  Description: INTERVENTIONS:  - Monitor percentage of each meal consumed  - Identify factors contributing to decreased intake, treat as appropriate  - Assist with meals as needed  - Monitor I&O, weight, and lab values if indicated  - Obtain nutrition services referral as needed  Outcome: Progressing

## 2022-06-19 NOTE — PROGRESS NOTES
Progress Note - General Surgery   Josselin Caraballo 76 y o  female MRN: [id number]  Unit/Bed#: S -01 Encounter: [id number]        Subjective/Objective     Subjective:  Feeling much better this morning  States diarrhea has decreased  Hungry  Denies abdominal pain    Blood pressure 152/84, pulse 83, temperature 97 8 °F (36 6 °C), resp  rate 20, height 5' (1 524 m), weight 79 5 kg (175 lb 4 3 oz), SpO2 97 %, not currently breastfeeding  ,Body mass index is 34 23 kg/m²  No intake or output data in the 24 hours ending 06/19/22 0956        Physical Exam:   Looks brighter  Electrolytes have been corrected  White count down to 13      Abdomen flat soft nontender    Assessment:  Diarrhea likely infectious etiology  C diff pending  Pneumobilia likely related to her previous cholecystectomy  LFTs are normal     Plan: Will advance to soft diet  Continue IV fluid hydration  No signs of cholangitis      Randolph Osorio DO  6/19/2022  9:56 AM

## 2022-06-19 NOTE — PROGRESS NOTES
Progress Note- Karel Mike 76 y o  female MRN: [id number]    Unit/Bed#: S -80 Encounter: [id number]      Assessment and Plan:    #1 Pneumobilia-subtle pneumobilia left lobe of the liver seen incidentally on CT scan on admission which wasn't noted on prior exams  Denies any history of ERCP  She does have history of cholecystectomy 20+ years ago  LFTs all normal except for chronically elevated alkaline phosphatase  GGT normal   Right upper quadrant ultrasound failed to show pneumobilia or any biliary abnormalities       -Monitor LFTs  -Observe     #2 Diarrhea-pt p/w acute onset lower abdominal cramping/diarrhea x 3 days  Originally improved w/ questran but then worsened again she advanced her diet at home  She developed chills and nausea/vomiting and presented to the ED for further evaluation where WBC elevated 16, creatinine elevated 1 36, and HR 120s  There was no evidence of colitis on noncontrast CT  Reports recent abx use for sinus infection 2 weeks ago however C diff negative  Enteric panel still pending  Last night patient's electrolytes were low requiring transfer to telemetry unit  Pt reports diarrhea improving; abdominal cramping, n/v resolved  Leukocytosis improving  Suspect infectious etiology given acute onset      -f/u enteric stool panel  -monitor CBC, electrolytes, replete as needed  -continue supportive care  -follow up blood cx-no gross 24 hours  -Continue antibiotics pt on Levaquin/flagyl total 5 days  -probiotic started  - diet advanced to low-fiber low residue/lactose restricted diet     #3 PUD, antral intestinal metaplasia  #4 Gastroparesis  Patient has history of gastroparesis maintained on gastroparesis diet and Reglan 10 mg 4 times daily at home    Her last EGD was in July 2021 showing 2 small clean based pre-pyloric ulcers which bled on contact, biopsies from the antrum EGD were negative for H pylori but did show intestinal metaplasia   -avoid nsaids   -continue PPI, Reglan  -patient overdue for repeat EGD to check for ulcer healing/intestinal metaplasia surveillance which she can have as an outpatient    ______________________________________________________________________    Subjective:     Patient reports diarrhea is improving  She only had 2 bowel movements over this morning in consistency is less watery  She reports abdominal pain is resided, denies any further nausea vomiting      Medication Administration - last 24 hours from 06/18/2022 1502 to 06/19/2022 1502       Date/Time Order Dose Route Action Action by     06/19/2022 0841 aspirin chewable tablet 81 mg 81 mg Oral Given Jacinta Saenz, ARLETTE     57/67/0080 0841 atorvastatin (LIPITOR) tablet 40 mg 40 mg Oral Given Jacinta Saenz RN     72/00/3502 0841 escitalopram (LEXAPRO) tablet 10 mg 10 mg Oral Given Jacinta Saenz RN     11/51/6254 2106 gabapentin (NEURONTIN) capsule 400 mg 400 [facility]     06/18/2022 2346 latanoprost (XALATAN) 0 005 % ophthalmic solution 1 drop 1 drop Both Eyes Given [facility]     06/19/2022 4638 levothyroxine tablet 37 5 mcg 37 5 mcg Oral Given Jacinta Saenz RN     56/25/5987 0841 metoprolol tartrate (LOPRESSOR) partial tablet 12 5 mg 12 5 mg Oral Given Jacinta Saenz RN     05/20/5821 2104 metoprolol tartrate (LOPRESSOR) partial tablet 12 5 mg 12 5 mg Oral Given [facility]     06/19/2022 3667 oxyCODONE-acetaminophen (PERCOCET) 5-325 mg per tablet 1 tablet 1 tablet Oral Given Jacinta Saenz RN     15/69/6054 8444 oxyCODONE-acetaminophen (PERCOCET) 5-325 mg per tablet 1 tablet 1 tablet Oral Given Jacinta Saenz RN     78/78/9144 1637 oxyCODONE-acetaminophen (PERCOCET) 5-325 mg per tablet 1 tablet 1 tablet Oral Given Mary Humphrey RN     06/19/2022 0841 pantoprazole (PROTONIX) EC tablet 40 mg 40 mg Oral Given Jacinta Saenz RN     49/15/0216 0014 zolpidem (AMBIEN) tablet 5 mg 5 mg Oral Given [facility]     06/19/2022 0841 oxybutynin (DITROPAN) tablet 5 mg 5 mg Oral Given Sean Olivier, RN     23/60/4805 1809 oxybutynin (DITROPAN) tablet 5 mg 5 mg Oral Given Rosa Bolivar, ARLETTE     06/19/2022 0841 Milnacipran HCl TABS 100 mg 100 mg Oral Not Given Sean Olivier, RN     46/91/9009 1601 Milnacipran HCl TABS 100 mg 100 mg Oral Not Given Cristian Loya, RN     06/19/2022 0322 sodium chloride 0 9 % infusion 100 mL/hr Intravenous New [address] [facility], [address] [place] [address]     69/26/7772 1312 heparin (porcine) subcutaneous injection 5,000 Units 5,000 Units Subcutaneous Given Carlos Wellington, RN     06/19/2022 0515 heparin (porcine) subcutaneous injection 5,000 Units 5,000 Units Subcutaneous Given Nahunyn Soy, RN     53/74/6769 2106 heparin (porcine) subcutaneous injection 5,000 Units 5,000 Units Subcutaneous Given Kandis Reis, ARLETTE     06/19/2022 1044 metroNIDAZOLE (FLAGYL) IVPB (premix) 500 mg 100 mL 500 mg Intravenous New [address] [facility], [address] [place] [address]     00/56/7309 0273 metroNIDAZOLE (FLAGYL) IVPB (premix) 500 mg 100 mL 500 mg Intravenous New [address] [facility], [address] [place] [address]     68/76/4116 1638 metroNIDAZOLE (FLAGYL) IVPB (premix) 500 mg 100 mL 500 mg Intravenous [address] Cristian Loya, ARLETTE     06/19/2022 0411 cefTRIAXone (ROCEPHIN) 1,000 mg in dextrose 5 % 50 mL IVPB 1,000 mg Intravenous New Bag Sean Olivier, [address] [place] [address]     97/36/4487 1122 insulin lispro (HumaLOG) 100 units/mL subcutaneous injection 1-6 Units 1 Units Subcutaneous Not Given Carlos Eliz, RN     06/19/2022 0735 insulin lispro (HumaLOG) 100 units/mL subcutaneous injection 1-6 Units 1 Units Subcutaneous Not Given Sean Olivier, RN     57/29/4794 1638 insulin lispro (HumaLOG) 100 units/mL subcutaneous injection 1-6 Units 1 Units Subcutaneous Not Given Cristian Loya RN     06/19/2022 9948 nystatin (MYCOSTATIN) powder   Topical Given Sean Olivier, ARLETTE     60/54/3704 1818 nystatin (MYCOSTATIN) powder   Topical Given Rosa Bolivar RN     06/19/2022 0312 potassium chloride (K-DUR,KLOR-CON) CR tablet 40 mEq 40 mEq Oral Given Deanna Zuniga RN     10/26/4449 2350 potassium chloride (K-DUR,KLOR-CON) CR tablet 40 mEq 40 mEq Oral Given \[facility]\""     06/18/2022 1922 potassium chloride (K-DUR,KLOR-CON) CR tablet 40 mEq 40 mEq Oral Given Venancio Morrow RN     06/18/2022 2350 potassium chloride 20 mEq IVPB (premix) 20 mEq Intravenous [address] [facility] Mallyia [place], [place]     06/18/2022 1923 potassium chloride 20 mEq IVPB (premix) 20 mEq Intravenous New [address] [facility] Venancio Morrow RN     06/18/2022 2028 calcium gluconate 3 g in sodium chloride 0 9 % 100 mL IVPB   Intravenous Canceled Entry Robert Malcolm RN     06/18/2022 2353 calcium gluconate 2 g in sodium chloride 0 9% 100 mL (premix) 0 g Intravenous Stopped \[facility]\""     06/18/2022 2054 calcium gluconate 2 g in sodium chloride 0 9% 100 mL (premix) 2 g Intravenous [address] [facility]     06/18/2022 2353 calcium gluconate 1 g in sodium chloride 0 9% 50 mL (premix) 0 g Intravenous Stopped \[facility]\""     06/18/2022 2159 calcium gluconate 1 g in sodium chloride 0 9% 50 mL (premix) 1 g Intravenous [facility] [address] [facility]     06/19/2022 0441 magnesium sulfate 4 g/100 mL IVPB (premix) 4 g 4 g Intravenous New [address] [facility]     45/89/8454 1312 saccharomyces boulardii (FLORASTOR) capsule 250 mg 250 mg Oral Given Karma Reveles RN          Objective:     Vitals: Blood pressure 97/55, pulse 64, temperature 97 7 °F (36 5 °C), resp  rate 20, height 5' (1 524 m), weight 79 5 kg (175 lb 4 3 oz), SpO2 99 %, not currently breastfeeding  ,Body mass index is 34 23 kg/m²        Intake/Output Summary (Last 24 hours) at 6/19/2022 1502  Last data filed at 6/19/2022 1332  Gross per 24 hour   Intake 240 ml   Output --   Net 240 ml       Physical Exam:   General Appearance: Awake and alert, in no acute distress  Abdomen: Soft, non-tender, non-distended; bowel sounds normal; no masses or no organomegaly    Invasive Devices  Report    Peripheral Intravenous Line  Duration           Peripheral IV 06/19/22 Right Antecubital <1 day                Lab Results:  Admission on 06/18/2022   Component Date Value    WBC 06/18/2022 16 90 (A)    RBC 06/18/2022 5 12     Hemoglobin 06/18/2022 13 7     Hematocrit 06/18/2022 44 3     MCV 06/18/2022 87     MCH 06/18/2022 26 8     MCHC 06/18/2022 30 9 (A)    RDW 06/18/2022 14 8     MPV 06/18/2022 11 2     Platelets 18/97/7110 342     nRBC 06/18/2022 0     Neutrophils Relative 06/18/2022 76 (A)    Immat GRANS % 06/18/2022 1     Lymphocytes Relative 06/18/2022 9 (A)    Monocytes Relative 06/18/2022 7     Eosinophils Relative 06/18/2022 7 (A)    Basophils Relative 06/18/2022 0     Neutrophils Absolute 06/18/2022 12 87 (A)    Immature Grans Absolute 06/18/2022 0 16     Lymphocytes Absolute 06/18/2022 1 54     Monocytes Absolute 06/18/2022 1 12     Eosinophils Absolute 06/18/2022 1 15 (A)    Basophils Absolute 06/18/2022 0 06     Protime 06/18/2022 15 6 (A)    INR 06/18/2022 1 25 (A)    PTT 06/18/2022 24     Sodium 06/18/2022 137     Potassium 06/18/2022 3 8     Chloride 06/18/2022 104     CO2 06/18/2022 23     ANION GAP 06/18/2022 10     BUN 06/18/2022 18     Creatinine 06/18/2022 1 36 (A)    Glucose 06/18/2022 225 (A)    Calcium 06/18/2022 8 7     Corrected Calcium 06/18/2022 9 2     AST 06/18/2022 12 (A)    ALT 06/18/2022 8     Alkaline Phosphatase 06/18/2022 139 (A)    Total Protein 06/18/2022 6 2 (A)    Albumin 06/18/2022 3 4 (A)    Total Bilirubin 06/18/2022 0 29     eGFR 06/18/2022 37     Lipase 06/18/2022 16     LACTIC ACID 06/18/2022 2 9 (A)    Color, UA 06/18/2022 Yellow     Clarity, UA 06/18/2022 Cloudy     Specific Gravity, UA 06/18/2022 >=1 030     pH, UA 06/18/2022 6 0     Leukocytes, UA 06/18/2022 Trace (A)    Nitrite, UA 06/18/2022 Negative     Protein, UA 06/18/2022 30 (1+) (A)    Glucose, UA 06/18/2022 Negative     Ketones, UA 06/18/2022 Negative     Urobilinogen, UA 06/18/2022 0 2     Bilirubin, UA 06/18/2022 Negative     Blood, UA 06/18/2022 Large (A)    Blood Culture 06/18/2022 No Growth at 24 hrs   Blood Culture 06/18/2022 No Growth at 24 hrs       C difficile toxin by PCR 06/18/2022 Negative     LACTIC ACID 06/18/2022 1 5     Procalcitonin 06/18/2022 0 07     POC Glucose 06/18/2022 179 (A)    Sodium 06/18/2022 140     Potassium 06/18/2022 4 3     Chloride 06/18/2022 113 (A)    CO2 06/18/2022 20 (A)    ANION GAP 06/18/2022 7     BUN 06/18/2022 15     Creatinine 06/18/2022 1 17     Glucose 06/18/2022 115     Calcium 06/18/2022 8 8     eGFR 06/18/2022 44     RBC, UA 06/18/2022 2-4     WBC, UA 06/18/2022 2-4     Epithelial Cells 06/18/2022 Moderate (A)    Bacteria, UA 06/18/2022 Moderate (A)    POC Glucose 06/18/2022 140     GGT 06/18/2022 9     Sodium 06/18/2022 140     Potassium 06/18/2022 2 5 (A)    Chloride 06/18/2022 117 (A)    CO2 06/18/2022 15 (A)    ANION GAP 06/18/2022 8     BUN 06/18/2022 12     Creatinine 06/18/2022 0 92     Glucose 06/18/2022 99     Calcium 06/18/2022 4 8 (A)    Corrected Calcium 06/18/2022 6 6 (A)    AST 06/18/2022 17     ALT 06/18/2022 5 (A)    Alkaline Phosphatase 06/18/2022 67     Total Protein 06/18/2022 3 3 (A)    Albumin 06/18/2022 1 8 (A)    Total Bilirubin 06/18/2022 0 19 (A)    eGFR 06/18/2022 59     POC Glucose 06/18/2022 142 (A)    Magnesium 06/18/2022 1 1 (A)    WBC 06/18/2022 13 30 (A)    RBC 06/18/2022 4 02     Hemoglobin 06/18/2022 10 8 (A)    Hematocrit 06/18/2022 35 4     MCV 06/18/2022 88     MCH 06/18/2022 26 6 (A)    MCHC 06/18/2022 30 2 (A)    RDW 06/18/2022 15 0     MPV 06/18/2022 10 8     Platelets 44/29/2994 253     nRBC 06/18/2022 0     Neutrophils Relative 06/18/2022 57     Immat GRANS % 06/18/2022 1     Lymphocytes Relative 06/18/2022 17     Monocytes Relative 06/18/2022 7     Eosinophils Relative 06/18/2022 18 (A)    Basophils Relative 06/18/2022 0     Neutrophils Absolute 06/18/2022 7 63 (A)    Immature Grans Absolute 06/18/2022 0 17     Lymphocytes Absolute 06/18/2022 2 21     Monocytes Absolute 06/18/2022 0 91     Eosinophils Absolute 06/18/2022 2 34 (A)    Basophils Absolute 06/18/2022 0 04     Ventricular Rate 06/18/2022 117     Atrial Rate 06/18/2022 122     AR Interval 06/18/2022 130     QRSD Interval 06/18/2022 126     QT Interval 06/18/2022 366     QTC Interval 06/18/2022 511     P Axis 06/18/2022 41     QRS Axis 06/18/2022 240     T Wave Axis 06/18/2022 52     Calcium, Ionized 06/18/2022 1 25     Potassium 06/18/2022 4 3     Procalcitonin 06/19/2022 0 06     POC Glucose 06/19/2022 115     POC Glucose 06/19/2022 142 (A)       Imaging Studies: I have personally reviewed pertinent imaging studies

## 2022-06-19 NOTE — PROGRESS NOTES
[facility]  Progress Note - Oanh De Leonat 1945, 76 y o  female MRN: [id number]  Unit/Bed#: S -01 Encounter: [id number]  Primary Care Provider: NANY Milton   Date and time admitted to hospital: 6/18/2022 12:19 AM    Pneumobilia  Assessment & Plan  -Ct abdomen : Subtle pneumobilia in the left lobe of the liver not seen on prior exams  Recommend correlation for evaluation of cholangitis  - no recent h/o surgical procedures/interventions    Plan  General surgery consult  Appreciate their input    Prolonged Q-T interval on ECG  Assessment & Plan  - QT/QTc  366/511 prior 408/527  - will give Tigan for nausea Tx    Acute-on-chronic kidney injury [facility])  Assessment & Plan  Lab Results   Component Value Date    EGFR 44 06/18/2022    EGFR 59 06/18/2022    EGFR 37 06/18/2022    CREATININE 1 17 06/18/2022    CREATININE 0 92 06/18/2022    CREATININE 1 36 (H) 06/18/2022     Continue to monitor kidney function  For now will hold off on lab work today as the patient had BMP at 11:00 p m  And is a hard stick  Will repeat labs in the morning    Diarrhea  Assessment & Plan  - POA: multiple episodes of non bloody diarrhea for the past 3 days  - assoc sx: nausea, emesis, abd cramping  - (-) recent antibiotic use, foods unknown procedence, travels  - most recent colonoscopy 7/13/21: left sided diverticulosis , no need for repeat due to age    - initial therapy cholestyramine with partial improvement of symptoms    Plan  Continue antibiotic  C diff negative  Enteric panel pending      Overactive bladder  Assessment & Plan  Continue oxybutin    Hypothyroidism  Assessment & Plan  - continue levothyroxine    Anxiety  Assessment & Plan  Continue minalcipran and escitalopram    Hypertension  Assessment & Plan  /60    Dyspepsia  Assessment & Plan  Continue protonix    Chronic pain disorder  Assessment & Plan  - continue percocet prn, gabapentin    Type 2 diabetes mellitus ([facility] [address] )  Assessment & Plan  Lab Results   Component Value Date    HGBA1C 8 6 (H) 2022       - home regimen metformin will be held while in hospital  - SSI initiated   - daily glucose checks  - hypoglycemia protocol  - clear liquid diet now, once better tolerance carbohydrate controlled diet  -blood sugar goal 140-180    (P) 143 6    * Sepsis (HCC)  Assessment & Plan  - Evidenced by Leukocytosis + tachycardia + lactic acid 2 9   - possible source : colitis  - Ct abdomen : There is colonic diverticulosis without evidence of acute diverticulitis  - s/p levoquin and flagyl in the ED + IV fluids, reflex lactic wnl    Plan  Continue flagyl + rocephin   IV fluid hydration  Follow blood cultures   Monitor cbc          VTE Pharmacologic Prophylaxis: VTE Score: 7 Moderate Risk (Score 3-4) - Pharmacological DVT Prophylaxis Ordered: heparin  Patient Centered Rounds: I performed bedside rounds with nursing staff today  Discussions with Specialists or Other Care Team Provider: discussed with nursing  Education and Discussions with Family / Patient: Updated  (daughter) at bedside  Time Spent for Care: 30 minutes  More than 50% of total time spent on counseling and coordination of care as described above  Current Length of Stay: 1 day(s)  Current Patient Status: Inpatient   Certification Statement: The patient will continue to require additional inpatient hospital stay due to electrolyte disturbance  Discharge Plan: Anticipate discharge in 48 hrs to home  Code Status: Level 1 - Full Code    Subjective:   Patient seen and examined   Feeling "good"    Objective:     Vitals:   Temp (24hrs), Av 1 °F (36 7 °C), Min:97 8 °F (36 6 °C), Max:98 3 °F (36 8 °C)    Temp:  [97 8 °F (36 6 °C)-98 3 °F (36 8 °C)] 97 8 °F (36 6 °C)  HR:  [69-84] 83  Resp:  [18-20] 20  BP: (127-168)/(65-88) 152/84  SpO2:  [96 %-99 %] 97 %  Body mass index is 34 23 kg/m²  Input and Output Summary (last 24 hours):      Intake/Output Summary (Last 24 hours) at 6/19/2022 1351  Last data filed at 6/19/2022 1332  Gross per 24 hour   Intake 240 ml   Output --   Net 240 ml       Physical Exam:   Physical Exam  Constitutional:       General: She is not in acute distress  Appearance: She is not ill-appearing, toxic-appearing or diaphoretic  HENT:      Head: Normocephalic  Nose: Nose normal       Mouth/Throat:      Mouth: Mucous membranes are moist    Eyes:      General: No scleral icterus  Right eye: No discharge  Left eye: No discharge  Pupils: Pupils are equal, round, and reactive to light  Cardiovascular:      Rate and Rhythm: Normal rate  Heart sounds: No murmur heard  No friction rub  No gallop  Pulmonary:      Effort: Pulmonary effort is normal  No respiratory distress  Breath sounds: No stridor  No rhonchi or rales  Chest:      Chest wall: No tenderness  Abdominal:      General: There is no distension  Palpations: There is no mass  Tenderness: There is no abdominal tenderness  There is no right CVA tenderness, left CVA tenderness, guarding or rebound  Musculoskeletal:         General: No swelling, tenderness, deformity or signs of injury  Right lower leg: No edema  Left lower leg: No edema  Skin:     Capillary Refill: Capillary refill takes less than 2 seconds  Coloration: Skin is pale  Skin is not jaundiced  Findings: No bruising, erythema or lesion  Neurological:      General: No focal deficit present  Mental Status: She is alert  Cranial Nerves: No cranial nerve deficit  Sensory: No sensory deficit  Motor: No weakness        Gait: Gait normal       Deep Tendon Reflexes: Reflexes normal    Psychiatric:         Mood and Affect: Mood normal           Additional Data:     Labs:  Results from last 7 days   Lab Units 06/18/22  2344   WBC Thousand/uL 13 30*   HEMOGLOBIN g/dL 10 8*   HEMATOCRIT % 35 4   PLATELETS Thousands/uL 253   NEUTROS PCT % 57 LYMPHS PCT % 17   MONOS PCT % 7   EOS PCT % 18*     Results from last 7 days   Lab Units 06/18/22  2344 06/18/22  1701   SODIUM mmol/L 140 140   POTASSIUM mmol/L 4 3  4 3 2 5*   CHLORIDE mmol/L 113* 117*   CO2 mmol/L 20* 15*   BUN mg/dL 15 12   CREATININE mg/dL 1 17 0 92   ANION GAP mmol/L 7 8   CALCIUM mg/dL 8 8 4 8*   ALBUMIN g/dL  --  1 8*   TOTAL BILIRUBIN mg/dL  --  0 19*   ALK PHOS U/L  --  67   ALT U/L  --  5*   AST U/L  --  17   GLUCOSE RANDOM mg/dL 115 99     Results from last 7 days   Lab Units 06/18/22  0042   INR  1 25*     Results from last 7 days   Lab Units 06/19/22  1115 06/19/22  0727 06/18/22  1620 06/18/22  1114 06/18/22  0731   POC GLUCOSE mg/dl 142* 115 142* 140 179*         Results from last 7 days   Lab Units 06/19/22  0407 06/18/22  0256 06/18/22  0042   LACTIC ACID mmol/L  --  1 5 2 9*   PROCALCITONIN ng/ml 0 06  --  0 07       Lines/Drains:  Invasive Devices  Report    Peripheral Intravenous Line  Duration           Peripheral IV 06/19/22 Right Antecubital <1 day                  Telemetry:  Telemetry Orders (From admission, onward)             48 Hour Telemetry Monitoring  Continuous x 48 hours        References:    Telemetry Guidelines   Question:  Reason for 48 Hour Telemetry  Answer:  Arrhythmias Requiring Medical Therapy (eg  SVT, Vtach/fib, Bradycardia, Uncontrolled A-fib)                 Telemetry Reviewed: Normal Sinus Rhythm  Indication for Continued Telemetry Use: Metabolic/electrolyte disturbance with high probability of dysrhythmia             Imaging: No pertinent imaging reviewed  Recent Cultures (last 7 days):   Results from last 7 days   Lab Units 06/18/22  0537 06/18/22  0042   BLOOD CULTURE   --  No Growth at 24 hrs  No Growth at 24 hrs     C DIFF TOXIN B BY PCR  Negative  --        Last 24 Hours Medication List:   Current Facility-Administered Medications   Medication Dose Route Frequency Provider Last Rate    acetaminophen  650 mg Oral Q6H PRN Duy Rene MD  albuterol  2 puff Inhalation Q6H PRN Lynette Fonseca MD      ALPRAZolam  0 25 mg Oral TID PRN Lynette Fonseca MD      aspirin  81 mg Oral Daily Lynette Fonseca MD      atorvastatin  40 mg Oral Daily Lynette Fonseca MD      cefTRIAXone  1,000 mg Intravenous Q24H Lynette Fonseca MD 1,000 mg (06/19/22 0411)    escitalopram  10 mg Oral Daily Lynette Fonseca MD      gabapentin  400 mg Oral HS Lynette Fonseca MD      heparin (porcine)  5,000 Units Subcutaneous Q8H [address] Lynette Fonseca MD      hydrALAZINE  10 mg Intravenous Q6H PRN Lynette Fonseca MD      insulin lispro  1-6 Units Subcutaneous TID AC Lynette Fonseca MD      latanoprost  1 drop Both Eyes HS Lynette Fonseca MD      levothyroxine  37 5 mcg Oral Early Morning Lynette Fonseca MD      meclizine  25 mg Oral 4x Daily PRN Lynette Fonseca MD      metoprolol tartrate  12 5 mg Oral Q12H [address] Lynette Fonseca MD      metroNIDAZOLE  500 mg Intravenous Clarita Christensen  mg (06/19/22 1044)    Milnacipran HCl  1 tablet Oral Daily Lynette Fonseca MD      nystatin   Topical BID Marimar Mcgarry MD      oxybutynin  5 mg Oral BID Lynette Fonseca MD      oxyCODONE-acetaminophen  1 tablet Oral Q6H PRN Lynette Fonseca MD      pantoprazole  40 mg Oral Daily Lynette Fonseca MD      saccharomyces boulardii  250 mg Oral BID NANY Samaniego      sodium chloride  100 mL/hr Intravenous Continuous Lynette Fonseca  mL/hr (06/19/22 0322)    trimethobenzamide  200 mg Intramuscular Q6H PRN Lynette Fonseca MD      zolpidem  5 mg Oral HS PRN Lynette Fonseca MD          Today, Patient Was Seen By: Carolina Costa MD    **Please Note: This note may have been constructed using a voice recognition system  **

## 2022-06-19 NOTE — PROGRESS NOTES
Progress Note - General Surgery   Leopold Kirsten 76 y o  female MRN: [id number]  Unit/Bed#: S -01 Encounter: [id number]        Subjective/Objective     Subjective:  Feeling much better this morning  States her diarrhea has decreased  She is hungry  Denies abdominal pain  Blood pressure 152/84, pulse 83, temperature 97 8 °F (36 6 °C), resp  rate 20, height 5' (1 524 m), weight 79 5 kg (175 lb 4 3 oz), SpO2 97 %, not currently breastfeeding  ,Body mass index is 34 23 kg/m²  No intake or output data in the 24 hours ending 06/19/22 0958        Physical Exam:   Looks well  Looks much brighter  More conversive  Not as tired appearing  Abdomen round soft nontender          Assessment:  Infectious diarrhea  Could certainly be viral   Dehydration which is improving  Pneumobilia which I think is the result of her previous cholecystectomy  No signs of cholangitis  Plan:  Continue IV hydration    Soft diet    Polly Dc,   6/19/2022  9:58 AM      Polly Dc, DO  6/19/2022  9:58 AM

## 2022-06-20 LAB
ALBUMIN SERPL BCP-MCNC: 2.4 G/DL (ref 3.5–5)
ALP SERPL-CCNC: 92 U/L (ref 34–104)
ALT SERPL W P-5'-P-CCNC: 7 U/L (ref 7–52)
ANION GAP SERPL CALCULATED.3IONS-SCNC: 3 MMOL/L (ref 4–13)
AST SERPL W P-5'-P-CCNC: 16 U/L (ref 13–39)
BILIRUB SERPL-MCNC: 0.18 MG/DL (ref 0.2–1)
BUN SERPL-MCNC: 15 MG/DL (ref 5–25)
CALCIUM ALBUM COR SERPL-MCNC: 9.1 MG/DL (ref 8.3–10.1)
CALCIUM SERPL-MCNC: 7.8 MG/DL (ref 8.4–10.2)
CHLORIDE SERPL-SCNC: 113 MMOL/L (ref 96–108)
CO2 SERPL-SCNC: 22 MMOL/L (ref 21–32)
CREAT SERPL-MCNC: 1.14 MG/DL (ref 0.6–1.3)
ERYTHROCYTE [DISTWIDTH] IN BLOOD BY AUTOMATED COUNT: 15.5 % (ref 11.6–15.1)
GFR SERPL CREATININE-BSD FRML MDRD: 45 ML/MIN/1.73SQ M
GLUCOSE SERPL-MCNC: 123 MG/DL (ref 65–140)
GLUCOSE SERPL-MCNC: 136 MG/DL (ref 65–140)
GLUCOSE SERPL-MCNC: 146 MG/DL (ref 65–140)
GLUCOSE SERPL-MCNC: 162 MG/DL (ref 65–140)
GLUCOSE SERPL-MCNC: 173 MG/DL (ref 65–140)
HCT VFR BLD AUTO: 30.3 % (ref 34.8–46.1)
HGB BLD-MCNC: 9.4 G/DL (ref 11.5–15.4)
MAGNESIUM SERPL-MCNC: 1.5 MG/DL (ref 1.9–2.7)
MCH RBC QN AUTO: 27.2 PG (ref 26.8–34.3)
MCHC RBC AUTO-ENTMCNC: 31 G/DL (ref 31.4–37.4)
MCV RBC AUTO: 88 FL (ref 82–98)
PLATELET # BLD AUTO: 214 THOUSANDS/UL (ref 149–390)
PMV BLD AUTO: 11.1 FL (ref 8.9–12.7)
POTASSIUM SERPL-SCNC: 4.8 MMOL/L (ref 3.5–5.3)
PROT SERPL-MCNC: 4.4 G/DL (ref 6.4–8.4)
RBC # BLD AUTO: 3.45 MILLION/UL (ref 3.81–5.12)
SODIUM SERPL-SCNC: 138 MMOL/L (ref 135–147)
WBC # BLD AUTO: 10.95 THOUSAND/UL (ref 4.31–10.16)

## 2022-06-20 PROCEDURE — 80053 COMPREHEN METABOLIC PANEL: CPT | Performed by: NURSE PRACTITIONER

## 2022-06-20 PROCEDURE — 99231 SBSQ HOSP IP/OBS SF/LOW 25: CPT | Performed by: SURGERY

## 2022-06-20 PROCEDURE — 83735 ASSAY OF MAGNESIUM: CPT | Performed by: NURSE PRACTITIONER

## 2022-06-20 PROCEDURE — 99232 SBSQ HOSP IP/OBS MODERATE 35: CPT | Performed by: INTERNAL MEDICINE

## 2022-06-20 PROCEDURE — 97163 PT EVAL HIGH COMPLEX 45 MIN: CPT

## 2022-06-20 PROCEDURE — 82948 REAGENT STRIP/BLOOD GLUCOSE: CPT

## 2022-06-20 PROCEDURE — 97167 OT EVAL HIGH COMPLEX 60 MIN: CPT

## 2022-06-20 PROCEDURE — 85027 COMPLETE CBC AUTOMATED: CPT | Performed by: NURSE PRACTITIONER

## 2022-06-20 RX ORDER — MAGNESIUM SULFATE HEPTAHYDRATE 40 MG/ML
2 INJECTION, SOLUTION INTRAVENOUS ONCE
Status: COMPLETED | OUTPATIENT
Start: 2022-06-20 | End: 2022-06-20

## 2022-06-20 RX ADMIN — OXYBUTYNIN CHLORIDE 5 MG: 5 TABLET ORAL at 18:36

## 2022-06-20 RX ADMIN — INSULIN LISPRO 1 UNITS: 100 INJECTION, SOLUTION INTRAVENOUS; SUBCUTANEOUS at 11:18

## 2022-06-20 RX ADMIN — PANTOPRAZOLE SODIUM 40 MG: 40 TABLET, DELAYED RELEASE ORAL at 09:10

## 2022-06-20 RX ADMIN — METOPROLOL TARTRATE 12.5 MG: 25 TABLET, FILM COATED ORAL at 09:09

## 2022-06-20 RX ADMIN — HEPARIN SODIUM 5000 UNITS: 5000 INJECTION INTRAVENOUS; SUBCUTANEOUS at 13:03

## 2022-06-20 RX ADMIN — GABAPENTIN 400 MG: 400 CAPSULE ORAL at 21:45

## 2022-06-20 RX ADMIN — METRONIDAZOLE 500 MG: 500 INJECTION, SOLUTION INTRAVENOUS at 18:48

## 2022-06-20 RX ADMIN — ESCITALOPRAM OXALATE 10 MG: 10 TABLET ORAL at 09:09

## 2022-06-20 RX ADMIN — METOPROLOL TARTRATE 12.5 MG: 25 TABLET, FILM COATED ORAL at 21:45

## 2022-06-20 RX ADMIN — ASPIRIN 81 MG CHEWABLE TABLET 81 MG: 81 TABLET CHEWABLE at 09:09

## 2022-06-20 RX ADMIN — OXYCODONE HYDROCHLORIDE AND ACETAMINOPHEN 1 TABLET: 5; 325 TABLET ORAL at 10:26

## 2022-06-20 RX ADMIN — HEPARIN SODIUM 5000 UNITS: 5000 INJECTION INTRAVENOUS; SUBCUTANEOUS at 05:08

## 2022-06-20 RX ADMIN — ATORVASTATIN CALCIUM 40 MG: 40 TABLET, FILM COATED ORAL at 09:09

## 2022-06-20 RX ADMIN — OXYCODONE HYDROCHLORIDE AND ACETAMINOPHEN 1 TABLET: 5; 325 TABLET ORAL at 18:36

## 2022-06-20 RX ADMIN — HEPARIN SODIUM 5000 UNITS: 5000 INJECTION INTRAVENOUS; SUBCUTANEOUS at 21:44

## 2022-06-20 RX ADMIN — NYSTATIN: 100000 POWDER TOPICAL at 09:10

## 2022-06-20 RX ADMIN — CEFTRIAXONE 1000 MG: 1 INJECTION, POWDER, FOR SOLUTION INTRAMUSCULAR; INTRAVENOUS at 05:08

## 2022-06-20 RX ADMIN — ALPRAZOLAM 0.25 MG: 0.25 TABLET ORAL at 21:45

## 2022-06-20 RX ADMIN — LEVOTHYROXINE SODIUM 37.5 MCG: 75 TABLET ORAL at 05:07

## 2022-06-20 RX ADMIN — NYSTATIN: 100000 POWDER TOPICAL at 18:41

## 2022-06-20 RX ADMIN — METRONIDAZOLE 500 MG: 500 INJECTION, SOLUTION INTRAVENOUS at 10:15

## 2022-06-20 RX ADMIN — LATANOPROST 1 DROP: 50 SOLUTION OPHTHALMIC at 21:45

## 2022-06-20 RX ADMIN — Medication 250 MG: at 09:10

## 2022-06-20 RX ADMIN — MAGNESIUM SULFATE HEPTAHYDRATE 2 G: 40 INJECTION, SOLUTION INTRAVENOUS at 11:37

## 2022-06-20 RX ADMIN — ZOLPIDEM TARTRATE 5 MG: 5 TABLET ORAL at 21:45

## 2022-06-20 RX ADMIN — METRONIDAZOLE 500 MG: 500 INJECTION, SOLUTION INTRAVENOUS at 02:13

## 2022-06-20 RX ADMIN — OXYBUTYNIN CHLORIDE 5 MG: 5 TABLET ORAL at 09:09

## 2022-06-20 RX ADMIN — Medication 250 MG: at 18:36

## 2022-06-20 NOTE — PROGRESS NOTES
Progress Note - General Surgery   Shraddha rAmstrong 76 y o  female MRN: [id number]  Unit/Bed#: S -01 Encounter: [id number]        Subjective/Objective     Subjective:  Continues to feel well  Diarrhea is much improved  Denies abdominal pain    Blood pressure 146/77, pulse 77, temperature 98 °F (36 7 °C), resp  rate 20, height 5' (1 524 m), weight 79 5 kg (175 lb 4 3 oz), SpO2 95 %, not currently breastfeeding  ,Body mass index is 34 23 kg/m²  Intake/Output Summary (Last 24 hours) at 6/20/2022 1102  Last data filed at 6/20/2022 0912  Gross per 24 hour   Intake 240 ml   Output 900 ml   Net -660 ml           Physical Exam:   Round soft nontender          Assessment:  Dehydration secondary to colitis  Definite clinical improvement  C diff is negative  Incidental pneumobilia status post cholecystectomy in the past   No signs of cholangitis    Plan:  Doing much better compared to admission    Surgery to see as needed    Meggan Walhs DO  6/20/2022  11:02 AM

## 2022-06-20 NOTE — PLAN OF CARE
Problem: PHYSICAL THERAPY ADULT  Goal: Performs mobility at highest level of function for planned discharge setting  See evaluation for individualized goals  Description: Treatment/Interventions: Functional transfer training, LE strengthening/ROM, Therapeutic exercise, Endurance training, Patient/family training, Equipment eval/education, Bed mobility, Gait training, Elevations  Equipment Recommended: Walker       See flowsheet documentation for full assessment, interventions and recommendations  Note: Prognosis: Fair  Problem List: Decreased strength, Decreased endurance, Impaired balance, Decreased mobility, Decreased safety awareness, Obesity, Pain  Assessment: Pt is a 76 y o  female seen for PT evaluation s/p admit to [address] [place] [address] on 6/18/2022 w/ Sepsis ([facility] [address] )  Order placed for PT  Comorbidities affecting pt's physical performance at time of assessment include: DM, HTN, obesity, and fibromyalgia, spinal stenosis   Personal factors affecting pt at time of IE include: limited home support, advanced age, inability to perform IADLs, inability to perform ADLs, and limited insight into impairments  Prior to admission, pt was was independent w/ all functional mobility w/ RW or SPC as needed, lived in one floor environment, had 2-3 ELL[place] (+) railing, and lived with son (has MS, unable to assist physically)   Upon evaluation: Pt requires min A for sit to stand and min A for ambulation with RW  (Please find full objective findings from PT assessment regarding body systems outlined above)  Impairments and limitations also listed above, especially due to  weakness, impaired balance, decreased endurance, gait deviations, pain, decreased activity tolerance, and fall risk  Pt's clinical presentation is currently unstable/unpredictable seen in pt's presentation of decreased safety awareness, fall risk, significant decline in functional mobility compared to baseline    Pt to benefit from continued skilled PT tx while in hospital and upon DC to address deficits as defined above and maximize level of functional mobility  From PT/mobility standpoint, recommendation at time of d/c would be inpatient rehab pending progress  Recommend  progression of ambulation and initiation of HEP as appropriate   PT Discharge Recommendation: Post acute rehabilitation services          See flowsheet documentation for full assessment

## 2022-06-20 NOTE — PROGRESS NOTES
Progress Note - Yann Leblanc 76 y o  female MRN: [id number]    Unit/Bed#: S -05 Encounter: [id number]    Assessment and Plan:   Principal Problem:    Sepsis ([facility] [address] )  Active Problems:    Type 2 diabetes mellitus (HCC)    Chronic pain disorder    Dyspepsia    Hypertension    Anxiety    Hypothyroidism    Overactive bladder    Diarrhea    Acute-on-chronic kidney injury ([facility] [address] )    Prolonged Q-T interval on ECG    Pneumobilia    #1  Pneumobilia: likely secondary to previous cholecystectomy, no signs cholangitis  -appreciate surgical input, no further work up indicated      #2 Acute diarrhea-suspect infectious etiology, bacterial work up negative, symptoms resolved, pain free today, no diarrhea    -can complete 5 day course abx  -probiotic started  -low-fiber low residue/lactose restricted diet     #3 PUD with history of intestinal metaplasia of stomach: Patient has history of gastroparesis maintained on gastroparesis diet and Reglan 10 mg 4 times daily at home   Her last EGD was in July 2021 showing 2 small clean based pre-pyloric ulcers which bled on contact, biopsies from the antrum EGD were negative for H pylori but did show intestinal metaplasia   -avoid nsaids   -continue PPI, Reglan  -outpatient EGD recommended     ----------------------------------------------------------------------------------------------------------------    Subjective:     No abdominal pain, tolerating diet, no diarrhea     Objective:     Vitals: Blood pressure 146/77, pulse 77, temperature 98 °F (36 7 °C), resp  rate 20, height 5' (1 524 m), weight 79 5 kg (175 lb 4 3 oz), SpO2 95 %, not currently breastfeeding  ,Body mass index is 34 23 kg/m²        Intake/Output Summary (Last 24 hours) at 6/20/2022 1228  Last data filed at 6/20/2022 0912  Gross per 24 hour   Intake 240 ml   Output 900 ml   Net -660 ml       Physical Exam:     General Appearance: Alert, appears stated age and cooperative  Lungs: Clear to auscultation bilaterally, no rales or rhonchi, no labored breathing/accessory muscle use  Heart: Regular rate and rhythm, S1, S2 normal, no murmur, click, rub or gallop  Abdomen: Soft, non-tender, non-distended; bowel sounds normal; no masses or no organomegaly  Extremities: No cyanosis, clubbing, or edema    Invasive Devices  Report    Peripheral Intravenous Line  Duration           Peripheral IV 06/19/22 Right Antecubital 1 day                Lab Results:  Results from last 7 days   Lab Units 06/20/22  0610 06/18/22  2344   WBC Thousand/uL 10 95* 13 30*   HEMOGLOBIN g/dL 9 4* 10 8*   HEMATOCRIT % 30 3* 35 4   PLATELETS Thousands/uL 214 253   NEUTROS PCT %  --  57   LYMPHS PCT %  --  17   MONOS PCT %  --  7   EOS PCT %  --  18*     Results from last 7 days   Lab Units 06/20/22  0610   POTASSIUM mmol/L 4 8   CHLORIDE mmol/L 113*   CO2 mmol/L 22   BUN mg/dL 15   CREATININE mg/dL 1 14   CALCIUM mg/dL 7 8*   ALK PHOS U/L 92   ALT U/L 7   AST U/L 16     Invalid input(s): BILI  Results from last 7 days   Lab Units 06/18/22  0042   INR  1 25*     Results from last 7 days   Lab Units 06/18/22  0042   LIPASE u/L 16       Imaging Studies: I have personally reviewed pertinent imaging studies  CT abdomen pelvis wo contrast    Result Date: 6/18/2022  Impression: Subtle pneumobilia in the left lobe of the liver not seen on prior exams  Differential is broad; has the patient had a procedure? Recommend correlation for evaluation of cholangitis     I personally discussed this study with Lissy Lou on 6/18/2022 at 2:25 AM  Workstation performed: [id number]     XR chest 1 view portable    Result Date: 6/18/2022  Impression: No acute cardiopulmonary disease  Workstation performed: [id number]     US right upper quadrant    Result Date: 6/19/2022  Impression: Status post cholecystectomy  No common bile duct dilatation   Workstation performed: [id number]

## 2022-06-20 NOTE — PROGRESS NOTES
[facility]  Progress Note - Shraddha Armstrong 1945, 76 y o  female MRN: [id number]  Unit/Bed#: S -01 Encounter: [id number]  Primary Care Provider: NANY Francis   Date and time admitted to hospital: 6/18/2022 12:19 AM    Pneumobilia  Assessment & Plan  Ct abdomen : Subtle pneumobilia in the left lobe of the liver not seen on prior exams  Recommend correlation for evaluation of cholangitis  no recent h/o surgical procedures/interventions    Plan  General surgery consult  Appreciate their input    Prolonged Q-T interval on ECG  Assessment & Plan  QT/QTc  366/511 prior 408/527  will give Tigan for nausea Tx    Acute-on-chronic kidney injury [facility])  Assessment & Plan  Lab Results   Component Value Date    EGFR 45 06/20/2022    EGFR 44 06/18/2022    EGFR 59 06/18/2022    CREATININE 1 14 06/20/2022    CREATININE 1 17 06/18/2022    CREATININE 0 92 06/18/2022     Continue to monitor kidney function  For now will hold off on lab work today as the patient had BMP at 11:00 p m  And is a hard stick  Will repeat labs in the morning    Diarrhea  Assessment & Plan  POA: multiple episodes of non bloody diarrhea for the past 3 days  assoc sx: nausea, emesis, abd cramping  (-) recent antibiotic use, foods unknown procedence, travels  most recent colonoscopy 7/13/21: left sided diverticulosis , no need for repeat due to age     initial therapy cholestyramine with partial improvement of symptoms    Plan  Continue antibiotic  C diff negative  Enteric panel pending      Overactive bladder  Assessment & Plan  Continue oxybutin    Hypothyroidism  Assessment & Plan  - continue levothyroxine    Anxiety  Assessment & Plan  Continue minalcipran and escitalopram    Hypertension  Assessment & Plan  /77    Dyspepsia  Assessment & Plan  Continue protonix    Chronic pain disorder  Assessment & Plan  continue percocet prn, gabapentin    Type 2 diabetes mellitus ([address] )  Assessment & Plan  Lab Results Component Value Date    HGBA1C 8 6 (H) 2022        home regimen metformin will be held while in hospital   SSI initiated    daily glucose checks  hypoglycemia protocol  clear liquid diet now, once better tolerance carbohydrate controlled diet  blood sugar goal 140-180    (P) [phone] [id number]    * Sepsis (HCC)  Assessment & Plan  Evidenced by Leukocytosis + tachycardia + lactic acid 2 9   possible source : colitis  Ct abdomen : There is colonic diverticulosis without evidence of acute diverticulitis  s/p levoquin and flagyl in the ED + IV fluids, reflex lactic wnl    Plan  Continue flagyl + rocephin   IV fluid hydration  Follow blood cultures   Monitor cbc          VTE Pharmacologic Prophylaxis: VTE Score: 7 Moderate Risk (Score 3-4) - Pharmacological DVT Prophylaxis Ordered: heparin  Patient Centered Rounds: I performed bedside rounds with nursing staff today  Discussions with Specialists or Other Care Team Provider: discussed with nurisng   Education and Discussions with Family / Patient: Updated  (daughter) via phone  Time Spent for Care: 30 minutes  More than 50% of total time spent on counseling and coordination of care as described above  Current Length of Stay: 2 day(s)  Current Patient Status: Inpatient   Certification Statement: The patient will continue to require additional inpatient hospital stay due to monitor in hospital  stop IVF and replace magnesium   Discharge Plan: Anticipate discharge tomorrow to home  Code Status: Level 1 - Full Code    Subjective:   Patient seen and examined   Feeling "good"    Objective:     Vitals:   Temp (24hrs), Av 2 °F (36 8 °C), Min:98 °F (36 7 °C), Max:98 4 °F (36 9 °C)    Temp:  [98 °F (36 7 °C)-98 4 °F (36 9 °C)] 98 °F (36 7 °C)  HR:  [75-77] 77  BP: (146-153)/(77-91) 146/77  SpO2:  [95 %-98 %] 95 %  Body mass index is 34 23 kg/m²  Input and Output Summary (last 24 hours):      Intake/Output Summary (Last 24 hours) at 6/20/2022 1503  Last data filed at 6/20/2022 0912  Gross per 24 hour   Intake --   Output 900 ml   Net -900 ml       Physical Exam:   Physical Exam  Constitutional:       General: She is not in acute distress  Appearance: She is not ill-appearing, toxic-appearing or diaphoretic  HENT:      Head: Normocephalic  Nose: Nose normal       Mouth/Throat:      Mouth: Mucous membranes are moist    Eyes:      General: No scleral icterus  Right eye: No discharge  Left eye: No discharge  Pupils: Pupils are equal, round, and reactive to light  Cardiovascular:      Rate and Rhythm: Normal rate  Heart sounds: No murmur heard  No gallop  Pulmonary:      Effort: Pulmonary effort is normal    Abdominal:      General: There is no distension  Palpations: There is no mass  Tenderness: There is no abdominal tenderness  There is no right CVA tenderness, left CVA tenderness, guarding or rebound  Hernia: No hernia is present  Musculoskeletal:         General: No swelling, tenderness, deformity or signs of injury  Right lower leg: No edema  Skin:     Coloration: Skin is pale  Skin is not jaundiced  Findings: No bruising, erythema, lesion or rash  Neurological:      Mental Status: She is alert  Cranial Nerves: No cranial nerve deficit  Sensory: No sensory deficit  Motor: No weakness        Gait: Gait normal    Psychiatric:         Mood and Affect: Mood normal           Additional Data:     Labs:  Results from last 7 days   Lab Units 06/20/22  0610 06/18/22  2344   WBC Thousand/uL 10 95* 13 30*   HEMOGLOBIN g/dL 9 4* 10 8*   HEMATOCRIT % 30 3* 35 4   PLATELETS Thousands/uL 214 253   NEUTROS PCT %  --  57   LYMPHS PCT %  --  17   MONOS PCT %  --  7   EOS PCT %  --  18*     Results from last 7 days   Lab Units 06/20/22  0610   SODIUM mmol/L 138   POTASSIUM mmol/L 4 8   CHLORIDE mmol/L 113*   CO2 mmol/L 22   BUN mg/dL 15   CREATININE mg/dL 1 14   ANION GAP mmol/L 3*   CALCIUM mg/dL 7 8*   ALBUMIN g/dL 2 4*   TOTAL BILIRUBIN mg/dL 0 18*   ALK PHOS U/L 92   ALT U/L 7   AST U/L 16   GLUCOSE RANDOM mg/dL 123     Results from last 7 days   Lab Units 06/18/22  0042   INR  1 25*     Results from last 7 days   Lab Units 06/20/22  1049 06/20/22  0723 06/19/22  2059 06/19/22  1559 06/19/22  1115 06/19/22  0727 06/18/22  1620 06/18/22  1114 06/18/22  0731   POC GLUCOSE mg/dl 162* 136 127 175* 142* 115 142* 140 179*         Results from last 7 days   Lab Units 06/19/22  0407 06/18/22  0256 06/18/22  0042   LACTIC ACID mmol/L  --  1 5 2 9*   PROCALCITONIN ng/ml 0 06  --  0 07       Lines/Drains:  Invasive Devices  Report    Peripheral Intravenous Line  Duration           Peripheral IV 06/19/22 Right Antecubital 1 day                  Telemetry:  Telemetry Orders (From admission, onward)             48 Hour Telemetry Monitoring  Continuous x 48 hours        Expiring   References:    Telemetry Guidelines   Question:  Reason for 48 Hour Telemetry  Answer:  Arrhythmias Requiring Medical Therapy (eg  SVT, Vtach/fib, Bradycardia, Uncontrolled A-fib)                 Telemetry Reviewed: Normal Sinus Rhythm  Indication for Continued Telemetry Use: No indication for continued use  Will discontinue  Imaging: No pertinent imaging reviewed  Recent Cultures (last 7 days):   Results from last 7 days   Lab Units 06/18/22  0537 06/18/22  0042   BLOOD CULTURE   --  No Growth at 48 hrs  No Growth at 48 hrs     C DIFF TOXIN B BY PCR  Negative  --        Last 24 Hours Medication List:   Current Facility-Administered Medications   Medication Dose Route Frequency Provider Last Rate    acetaminophen  650 mg Oral Q6H PRN Aranza Butts MD      albuterol  2 puff Inhalation Q6H PRN Aranza Butts MD      ALPRAZolam  0 25 mg Oral TID PRN Aranza Butts MD      aspirin  81 mg Oral Daily Aranza Butts MD      atorvastatin  40 mg Oral Daily Aranza Butts MD      cefTRIAXone  1,000 mg Intravenous Q24H Eileen Pozo MD 1,000 mg (06/20/22 0508)    escitalopram  10 mg Oral Daily Eileen Pozo MD      gabapentin  400 mg Oral HS Eileen Pozo MD      heparin (porcine)  5,000 Units Subcutaneous Q8H [address] Eileen Pozo MD      hydrALAZINE  10 mg Intravenous Q6H PRN Eileen Pozo MD      insulin lispro  1-6 Units Subcutaneous TID AC Eileen Pozo MD      latanoprost  1 drop Both Eyes HS Eileen Pozo MD      levothyroxine  37 5 mcg Oral Early Morning Eileen Pozo MD      meclizine  25 mg Oral 4x Daily PRN Eileen Pozo MD      metoprolol tartrate  12 5 mg Oral Q12H [address] Eileen Pozo MD      metroNIDAZOLE  500 mg Intravenous Neftali Laurent  mg (06/20/22 1015)    Milnacipran HCl  1 tablet Oral Daily Eileen Pozo MD      nystatin   Topical BID Shereen Petit MD      oxybutynin  5 mg Oral BID Eileen Pozo MD      oxyCODONE-acetaminophen  1 tablet Oral Q6H PRN Eileen Pozo MD      pantoprazole  40 mg Oral Daily Eileen Pozo MD      saccharomyces boulardii  250 mg Oral BID NANY Gonzalez      trimethobenzamide  200 mg Intramuscular Q6H PRN Eileen Pozo MD      zolpidem  5 mg Oral HS PRN Eileen Pozo MD          Today, Patient Was Seen By: Stan Domínguez MD    **Please Note: This note may have been constructed using a voice recognition system  **

## 2022-06-20 NOTE — PLAN OF CARE
Problem: OCCUPATIONAL THERAPY ADULT  Goal: Performs self-care activities at highest level of function for planned discharge setting  See evaluation for individualized goals  Description:   Note: Limitation: Decreased ADL status, Decreased endurance, Decreased self-care trans, Decreased high-level ADLs  Prognosis: Good  Assessment: Pt is a 76 y o  female seen for OT evaluation at [address] [place] [address], admitted 6/18/2022 w/ Sepsis ([address] )  OT completed extensive review of pt's medical and social history  Comorbidities affecting pt's functional performance at time of assessment include: DM type 2, chronic pain, HTN, anxiety, lumbar fusion (2018), glaucoma, obesity  Personal factors affecting pt at time of IE include:steps to enter environment, limited home support, difficulty performing ADLS, difficulty performing IADLS , limited insight into deficits, decreased initiation and engagement  and health management   Prior to admission, pt was living in [facility] apartment, [address] [facility], and was independent with ADL/IADL, using a RW or cane for mobility  Pt states she resides with her son who has MS  He does not require any assistance from her, other than occasionally fine motor activities such as opening pill bottles, but he is unable to physically assist the patient upon discharge  Upon evaluation, pt presents to OT below baseline due to the following performance deficits: weakness, decreased strength, decreased balance and decreased tolerance  Pt to benefit from continued skilled OT tx while in the hospital to address deficits as defined above and maximize level of functional independence w ADL's and functional mobility  Occupational Performance areas to address include: grooming, bathing/shower, toilet hygiene, dressing, functional mobility and functional transfers, bed mobility  The patient's raw score on the AM-PAC Daily Activity inpatient short form is 17, standardized score is 37 26, less than 39 4   Patients at this level are likely to benefit from DC to post-acute rehabilitation services  Based on findings, pt is of high complexity  At this time, OT recommendations at time of discharge are short term rehab       OT Discharge Recommendation: Post acute rehabilitation services

## 2022-06-20 NOTE — PHYSICAL THERAPY NOTE
PHYSICAL THERAPY EVALUATION  NAME: Otis Varela  AGE:   76 y o  MRN:  [id number]  ADMIT DX: Diarrhea [R19 7]  Dehydration [E86 0]  Abdominal pain [R10 9]  Elevated serum creatinine [R79 89]  Pneumobilia [K83 8]  Nausea vomiting and diarrhea [R11 2, R19 7]  Severe sepsis (HCC) [A41 9, R65 20]    PMH:   Past Medical History:   Diagnosis Date    Acid reflux     Anemia     hx of iron-deficient    Anxiety     Arthritis     Asthma     last needed inhaler last year 2020    Basal cell carcinoma     upper lip    Chronic narcotic dependence (HCC)     Chronic pain     Colon polyp     Cystocele     Diabetes mellitus ([address] )     stable    Disease of thyroid gland     hypothyroidism    Diverticulosis     Dysfunctional uterine bleeding     last assessed - [id number]    Fibromyalgia     Gastric ulcer     Gastroparesis     History of colonic polyps     last assessed - [id number]    History of gastroesophageal reflux (GERD)     Hypercholesterolemia     Hyperlipidemia     Hypertension     IBS (irritable bowel syndrome)     Post laminectomy syndrome     Seasonal allergies     Spinal stenosis      LENGTH OF STAY: 2       06/20/22 1233   PT Last Visit   PT Visit Date 06/20/22   Note Type   Note type Evaluation   Pain Assessment   Pain Assessment Tool 0-10   Pain Score 5   Pain Location/Orientation Location: Back   Hospital Pain Intervention(s) Repositioned; Ambulation/increased activity   Restrictions/Precautions   Weight Bearing Precautions Per Order No   Other Precautions Pain; Fall Risk   Home Living   Type of [address] One level;Stairs to enter with rails  (1st floor, 2-3 ELLYN)   Bathroom Shower/Tub Tub/shower unit   Home Equipment Walker;Cane  (rollator, chooses device depending on back pain; sometimes furniture walks at home)   Additional Comments Ambulates with mod I at baseline     Prior Function   Level of [place] Independent with ADLs and functional mobility   Lives With Son  (son is home all the time, has MS with "bad days" where son requires use of RW, Pt sometimes assists with fine motor activities, but does not assist with transfers/ADLs)   Receives Help From Jasmyn Ruiz  (assists with driving)   ADL Assistance Independent   IADLs Independent   Falls in the last 6 months 1 to 4   General   Family/Caregiver Present Yes  (neighbor)   Cognition   Overall Cognitive Status WFL   Arousal/Participation Cooperative   Attention Attends with cues to redirect   Orientation Level Oriented X4   Memory Within functional limits   Following Commands Follows one step commands without difficulty   Subjective   Subjective Agrees to PT evaluation and is pleasant and cooperative  RLE Assessment   RLE Assessment X   Strength RLE   RLE Overall Strength 4-/5  (functionally)   LLE Assessment   LLE Assessment X   Strength LLE   LLE Overall Strength 4-/5  (functionally)   Bed Mobility   Supine to Sit Unable to assess  (pt OOB in chair pre/post session with alarm intact)   Transfers   Sit to Stand 4  Minimal assistance   Additional items Assist x 1; Increased time required;Verbal cues   Stand to Sit 4  Minimal assistance   Additional items Assist x 1; Increased time required;Verbal cues   Ambulation/Elevation   Gait pattern Improper Weight shift;Decreased foot clearance; Forward Flexion; Short stride; Excessively slow   Gait Assistance 4  Minimal assist   Additional items Assist x 1;Verbal cues   Assistive Device Rolling walker   Distance 20` x2   Balance   Static Sitting Fair +   Dynamic Sitting Fair   Static Standing Fair -   Dynamic Standing Fair -   Ambulatory Poor +   Endurance Deficit   Endurance Deficit Yes   Endurance Deficit Description limited ambulation distance, gait degradation   Activity Tolerance   Activity Tolerance Patient limited by fatigue   Nurse Made Aware Per RN, pt appropriate to evaluate   Assessment   Prognosis Fair   Problem List Decreased strength;Decreased endurance; Impaired balance;Decreased mobility; Decreased safety awareness; Obesity;Pain   Goals   Patient Goals none stated this session   STG Expiration Date 06/30/22   Short Term Goal #1 Pt will be able to: (1) perform bed mobility with supervision to promote OOB activity (2) perform sit to stand with supervision to decrease burden of care (3) ambulate at least 200` with supervision and least restrictive AD to increase activity tolerance (4) increase standing balance by 1 grade to decrease risk of falls  (5) negotiate at least 2-3 stairs with supervision to allow safe access into apartment   PT Treatment Day 0   Plan   Treatment/Interventions Functional transfer training;LE strengthening/ROM; Therapeutic exercise; Endurance training;Patient/family training;Equipment eval/education; Bed mobility;Gait training;Elevations   PT Frequency 3-5x/wk   Recommendation   PT Discharge Recommendation Post acute rehabilitation services   Equipment Recommended [facility] Recommended Wheeled walker   AM-PAC Basic Mobility Inpatient   Turning in Bed Without Bedrails 3   Lying on Back to Sitting on Edge of Flat Bed 2   Moving Bed to Chair 3   Standing Up From Chair 3   Walk in Room 3   Climb 3-5 Stairs 2   Basic Mobility Inpatient Raw Score 16   Basic Mobility Standardized Score 38 32   Highest Level Of Mobility   -[facility] Goal 5: Stand one or more mins   -HLM Achieved 7: Walk 25 feet or more   End of Consult   Patient Position at End of Consult Bedside chair;Bed/Chair alarm activated; All needs within reach   The patient's AM-PAC Basic Mobility Inpatient Short Form Raw Score is 16, Standardized Score is 38 32   A standardized score less than 40 78 or Raw Score of 16 suggests the patient may benefit from discharge to post-acute rehabilitation services, which DOES coincide with CURRENT above PT recommendations  However please refer to therapist recommendation for discharge planning given other factors that may influence destination       Adapted from [employer] [facility] 6-Clicks Basic Mobility and Daily Activity Scores With Discharge Destination  Physical Therapy, 2021;101:1-9  DOI: 10 [id number]      Pt was seen for a co-eval with OT due to potential need for significant physical assist, poor pain control, impaired mental status, limiting behaviors, and poor adherence to precautions  Assessment: Pt is a 76 y o  female seen for PT evaluation s/p admit to [facility] on 6/18/2022 w/ Sepsis ([address] )  Order placed for PT  Comorbidities affecting pt's physical performance at time of assessment include: DM, HTN, obesity, and fibromyalgia, spinal stenosis   Personal factors affecting pt at time of IE include: limited home support, advanced age, inability to perform IADLs, inability to perform ADLs, and limited insight into impairments  Prior to admission, pt was was independent w/ all functional mobility w/ RW or SPC as needed, lived in one floor environment, had 2-3 ELLYN (+) railing, and lived with son (has MS, unable to assist physically)   Upon evaluation: Pt requires min A for sit to stand and min A for ambulation with RW  (Please find full objective findings from PT assessment regarding body systems outlined above)  Impairments and limitations also listed above, especially due to  weakness, impaired balance, decreased endurance, gait deviations, pain, decreased activity tolerance, and fall risk  Pt's clinical presentation is currently unstable/unpredictable seen in pt's presentation of decreased safety awareness, fall risk, significant decline in functional mobility compared to baseline  Pt to benefit from continued skilled PT tx while in hospital and upon DC to address deficits as defined above and maximize level of functional mobility  From PT/mobility standpoint, recommendation at time of d/c would be inpatient rehab pending progress  Recommend  progression of ambulation and initiation of HEP as appropriate         Bere Corral Shahida, PT,DPT

## 2022-06-20 NOTE — OCCUPATIONAL THERAPY NOTE
Occupational Therapy Evaluation      Clyde Arevalo    6/20/2022    Principal Problem:    Sepsis ([facility] [address] )  Active Problems:    Type 2 diabetes mellitus (HCC)    Chronic pain disorder    Dyspepsia    Hypertension    Anxiety    Hypothyroidism    Overactive bladder    Diarrhea    Acute-on-chronic kidney injury ([facility] [address] )    Prolonged Q-T interval on ECG    Pneumobilia      Past Medical History:   Diagnosis Date    Acid reflux     Anemia     hx of iron-deficient    Anxiety     Arthritis     Asthma     last needed inhaler last year 2020    Basal cell carcinoma     upper lip    Chronic narcotic dependence (HCC)     Chronic pain     Colon polyp     Cystocele     Diabetes mellitus ([facility] [address] )     stable    Disease of thyroid gland     hypothyroidism    Diverticulosis     Dysfunctional uterine bleeding     last assessed - [id number]    Fibromyalgia     Gastric ulcer     Gastroparesis     History of colonic polyps     last assessed - [id number]    History of gastroesophageal reflux (GERD)     Hypercholesterolemia     Hyperlipidemia     Hypertension     IBS (irritable bowel syndrome)     Post laminectomy syndrome     Seasonal allergies     Spinal stenosis        Past Surgical History:   Procedure Laterality Date    APPENDECTOMY      BACK SURGERY      BREAST CYST EXCISION Left     CHOLECYSTECTOMY      COLONOSCOPY      ESOPHAGOGASTRODUODENOSCOPY N/A 09/28/2016    Procedure: ESOPHAGOGASTRODUODENOSCOPY (EGD); Surgeon: Michelle Suggs MD;  [facility]: [facility];   Service:     HERNIA REPAIR      HYSTERECTOMY      TTAH-BSO age 25    LAMINECTOMY      LUMBAR LAMINECTOMY      OOPHORECTOMY      age 25    NC ARTHRODESIS POSTERIOR/POSTEROLATERAL THORACIC N/A 06/04/2018    Procedure: Reopening of lumbar incision for T12-L5 posterior instrumented fixation and fusion and T12-L4 posterior decompression;  Surgeon: Travis Dukes MD;  [facility]: [facility];  Service: Neurosurgery    NC COLONOSCOPY FLX DX W/COLLJ SPEC WHEN PFRMD N/A 03/02/2016    Procedure: EGD AND COLONOSCOPY;  Surgeon: An Herzog MD;  [facility]: [facility]; Service: Gastroenterology    IN DILATE ESOPHAGUS N/A 09/28/2016    Procedure: DILATATION ESOPHAGEAL;  Surgeon: An Herzog MD;  [facility]; Service: Gastroenterology    IN ESOPHAGOGASTRODUODENOSCOPY TRANSORAL DIAGNOSTIC N/A 07/18/2016    Procedure: ESOPHAGOGASTRODUODENOSCOPY (EGD); Surgeon: An Herzog MD;  [facility]: [facility]; Service: Gastroenterology    IN IMPLANT SPINAL NEUROSTIM/ Left 06/04/2018    Procedure: removal of left buttock implantable pulse generator and placement of new  implantable pulse generator;  Surgeon: Juanjose Dee MD;  [facility];  Service: Neurosurgery        06/20/22 1205   OT Last Visit   OT Visit Date 06/20/22   Note Type   Note type Evaluation   Restrictions/Precautions   Weight Bearing Precautions Per Order No   Other Precautions Pain; Fall Risk   Pain Assessment   Pain Assessment Tool 0-10   Pain Score 5   Pain Location/Orientation Location: Back   Home Living   Type of Home Apartment   Home Layout Stairs to enter with rails  (1st floor; 2-3STE)   Bathroom Shower/Tub Tub/shower unit   Home Equipment Walker;Cane  (Rollator    chooses device depending on back pain; sometimes furniture walks at home)   Prior Function   Level of [place] Independent with ADLs and functional mobility   Lives With Son  (home all the time; son has MS and has some bad days   She sometimes has to help with fine motor activities but does not assist with transfers/ADLs, etc)   ADL Assistance Independent   IADLs Independent   Falls in the last 6 months 1 to 4   Psychosocial   Psychosocial (WDL) WDL   Length of Time/Family Visitation   (friend/neighbor present)   ADL   Eating Assistance 7  Independent   Grooming Assistance 5  Supervision/Setup   UB [address] 4  Minimal Assistance   LB [address] 3  Moderate Assistance   [address] 3 Moderate Assistance   Toileting Assistance  3  Moderate Assistance   Bed Mobility   Additional Comments Pt received OOB in chair   Transfers   Sit to Stand 4  Minimal assistance   Additional items Assist x 1   Stand to Sit 4  Minimal assistance   Additional items Assist x 1   Stand pivot 4  Minimal assistance   Additional items Assist x 1  (RW)   Toilet transfer 4  Minimal assistance   Additional items Assist x 1;Standard toilet  (grab bars)   Functional Mobility   Functional Mobility 4  Minimal assistance   Additional Comments x1   Additional items Rolling walker   Activity Tolerance   Activity Tolerance Patient limited by fatigue;Patient limited by pain   Nurse Made Aware ARLETTE Calles   RUMEHRAN Assessment   RUE Assessment WFL   LUE Assessment   LUE Assessment WFL   Hand Function   Gross Motor Coordination Functional   Fine Motor Coordination Functional   Cognition   Overall Cognitive Status WFL   Arousal/Participation Cooperative   Attention Attends with cues to redirect   Orientation Level Oriented X4   Memory Within functional limits   Following Commands Follows one step commands without difficulty   Assessment   Limitation Decreased ADL status; Decreased endurance;Decreased self-care trans;Decreased high-level ADLs   Prognosis Good   Assessment Pt is a 76 y o  female seen for OT evaluation at [address] [place] [address], admitted 6/18/2022 w/ Sepsis [address] [address] )  OT completed extensive review of pt's medical and social history  Comorbidities affecting pt's functional performance at time of assessment include: DM type 2, chronic pain, HTN, anxiety, lumbar fusion (2018), glaucoma, obesity  Personal factors affecting pt at time of IE include:steps to enter environment, limited home support, difficulty performing ADLS, difficulty performing IADLS , limited insight into deficits, decreased initiation and engagement  and health management    Prior to admission, pt was living in [facility] apartment, [address] [facility], and was independent with ADL/IADL, using a RW or cane for mobility  Pt states she resides with her son who has MS  He does not require any assistance from her, other than occasionally fine motor activities such as opening pill bottles, but he is unable to physically assist the patient upon discharge  Upon evaluation, pt presents to OT below baseline due to the following performance deficits: weakness, decreased strength, decreased balance and decreased tolerance  Pt to benefit from continued skilled OT tx while in the hospital to address deficits as defined above and maximize level of functional independence w ADL's and functional mobility  Occupational Performance areas to address include: grooming, bathing/shower, toilet hygiene, dressing, functional mobility and functional transfers, bed mobility  The patient's raw score on the AM-PAC Daily Activity inpatient short form is 17, standardized score is 37 26, less than 39 4  Patients at this level are likely to benefit from DC to post-acute rehabilitation services  Based on findings, pt is of high complexity  At this time, OT recommendations at time of discharge are short term rehab  Plan   Treatment Interventions ADL retraining;Functional transfer training;UE strengthening/ROM; Endurance training;Cognitive reorientation;Patient/family training;Equipment evaluation/education; Compensatory technique education;Continued evaluation; Energy conservation   Goal Expiration Date 06/30/22   OT Frequency 3-5x/wk   Recommendation   OT Discharge Recommendation Post acute rehabilitation services   AM-[facility] Daily Activity Inpatient   Lower Body Dressing 2   Bathing 2   Toileting 2   Upper Body Dressing 3   Grooming 4   Eating 4   Daily Activity Raw Score 17   Daily Activity Standardized Score (Calc for Raw Score >=11) 37 26     Pt will achieve the following goals within 10 days  *Pt will complete grooming with independence  *Pt will complete UB bathing and dressing with independence      *Pt will complete LB bathing and dressing with independence   *Pt will complete toileting (hygiene and clothing management) with independence    *Pt will complete bed mobility with independence, with bed flat and no side rail to prep for purposeful tasks    *Pt will perform functional transfers with modified independence in order to complete ADL routine  *Pt will increase standing tolerance to 5+ minutes in order to complete ADL routine  *Pt will complete item retrieval and light home management with modified independence while demonstrating good safety  *Pt will demonstrate increased activity tolerance in order to complete ADL routine  *Pt will participate in cognitive assessment to determine level of safety for returning home    *Pt will participate in UE therapeutic exercise in order to maximize strength for ADL transfers  *Pt will sit on EOB for 10+ minutes for increased safety with seated activity tolerance during ADL tasks  *Pt will identify 3-5 fall risks to ensure safety upon discharge      [employer], MS, OTR/L

## 2022-06-20 NOTE — UTILIZATION REVIEW
Initial Clinical Review    Admission: Date/Time/Statement:   Admission Orders (From admission, onward)     Ordered        06/18/22 0329  Inpatient Admission  Once                      Orders Placed This Encounter   Procedures    Inpatient Admission     Contact precautions (diarrhea)     Standing Status:   Standing     Number of Occurrences:   1     Order Specific Question:   Level of Care     Answer:   Med Surg [16]     Order Specific Question:   Bed request comments     Answer:   contact precautions (diarrhea)     Order Specific Question:   Estimated length of stay     Answer:   More than 2 Midnights     Order Specific Question:   Certification     Answer:   I certify that inpatient services are medically necessary for this patient for a duration of greater than two midnights  See H&P and MD Progress Notes for additional information about the patient's course of treatment  ED Arrival Information     Expected   -    Arrival   6/18/2022 00:19    Acuity   Urgent            Means of arrival   Ambulance    Escorted by   [facility]    Service   Hospitalist    Admission type   Urgent            Arrival complaint   Diarrhea           Chief Complaint   Patient presents with    Diarrhea     Diarrhea for 7 days, started vomiting today, recent admission for TIA       Initial Presentation: 76 y o  female history of T2DM, Hypertension, Hypothyroidism, Overactive bladder , chronic pain syndrome, dyslipidemia, PUD, OLGA LIDIA, stroke, sinusitis, gastroparesis presents to ED from home via EMS for evaluation of diarrhea that started 3 days ago- up to 9-10 BM/day, she took Questran at home and symptoms seemed to improvedbut recured Tuesday with associated significant nausea and emesis    On exam, pt tachycardic, SBP elevated, LLQ abdom tenderness, dry mucous membranes   WBC 16 9, LA 2 9, creat 1 3 from baseline 0 9-1 1 CT A/P shows colonic diverticulosis without evidence of acute diverticulitis, subtle pneumobilia in the left lobe of the liver not seen on prior exams  ECG- NSR, no ST T wave changes, shows prolonged QT/QTc  366/511 (prior 408/527)  Pt given IV abx, IVF, IV analgesic, IV antiemetic in ED  Pt admitted as Inpatient with sepsis, likely GI source-possible colitis,diarrhea,  SADAF  Plan - GI consult,IV abx- Flagyl and rocephin, Stool studies, IVF, BMP and CBC   F/U blood cultures  PRN Tigan   General surgery consult for pneumobilia  Will hold patients home lisinopril, baclofen and pyridium  Replete potassium and calcium for low levels   Celia Gama Telemetry  General surgery-Admitted with a week's worth of diarrhea  Likely dehydrated from that  Abdomen round soft without any specific point tenderness  History of cholecystectomy in the past which likely explains pneumobilia   No need for surgical intervention  Continue workup for diarrhea  Consider GI evaluation   GI consult-abdomen benign, low abdominal cramping w/ diarrhea  Order ultrasound to further check for the subtotal pneumobilia  LFTs are normal except for a chronically elevated alkaline phosphatase which would make cholangitis quite unlikely at the present time   Pending course and findings may have to consider ERCP  She does have a spinal stimulator therefore no MRI MRCP  F/U C diff  Monitor LFT's, check GGT low-fiber low residue/lactose restricted diet as lila   PPI, Reglan for gastroparesis    Date:6/19   Day 2:   General surgery- pt states diarrhea has decreased, denies abdominal pain, feeling hungry  Abdomen round soft nontender  Pneumobilia- think is the result of her previous cholecystectomy  No signs of cholangitis  Continue IVF, soft diet  Medicine-Creat improved, continue to monitor tomorrow-hold off on lab work today as the patient had BMP at 11:00 p m and is a hard stick   C diff neg, enteric panel pending  Contiue IV abx-Flagyl ,Ceftraixone  GI- US shows no biliary air  GGT normal  Continue to monitor LFT's    Pt reports diarrhea improving; abdominal cramping, n/v resolved  Leukocytosis improving  Continue to monitor lytes   IVF   ED Triage Vitals   Temperature Pulse Respirations Blood Pressure SpO2   06/18/22 0022 06/18/22 0022 06/18/22 0022 06/18/22 0022 06/18/22 0022   98 5 °F (36 9 °C) (!) 120 18 159/79 98 %      Temp Source Heart Rate Source Patient Position - Orthostatic VS BP Location FiO2 (%)   06/18/22 0022 06/18/22 0022 06/18/22 0022 06/18/22 0022 --   Oral Monitor Lying Right arm       Pain Score       06/18/22 0046       9          Wt Readings from Last 1 Encounters:   06/18/22 79 5 kg (175 lb 4 3 oz)     Additional Vital Signs:   Date/Time Temp Pulse Resp BP MAP (mmHg) SpO2   06/19/22 14:36:38 97 7 °F (36 5 °C) 64 -- 97/55 69 99 %   06/19/22 07:27:29 97 8 °F (36 6 °C) 83 -- 152/84 107 97 %   06/18/22 23:58:34 97 9 °F (36 6 °C) 84 20 168/88 115 96 %   06/18/22 2108 -- -- -- -- -- --   06/18/22 2107 98 2 °F (36 8 °C) 78 20 132/79 97 98 %   06/18/22 21:06:33 98 2 °F (36 8 °C) 78 18 132/79 97 98 %   06/18/22 15:14:43 98 3 °F (36 8 °C) 69 18 127/65 86 99 %   06/18/22 07:33:55 97 8 °F (36 6 °C) 62 18 174/88 Abnormal  117 99 %   06/18/22 04:24:58 99 °F (37 2 °C) -- 18 170/77 108 --   06/18/22 0222 -- 106 Abnormal  18 177/81 Abnormal  116 95 %       Pertinent Labs/Diagnostic Test Results:   US right upper quadrant   Final Result by Ene Thao MD (06/19 1156)      Status post cholecystectomy  No common bile duct dilatation  Workstation performed: [id number]         XR chest 1 view portable   ED Interpretation by Olivia Lindo MD (06/18 0134)   Elevated R hemidiaphragm read by me  Final Result by La Escalante DO (06/18 2149)      No acute cardiopulmonary disease                    Workstation performed: [id number]         CT abdomen pelvis wo contrast   ED Interpretation by Olivia Lindo MD (06/18 4113)   FINDINGS:     ABDOMEN     LOWER CHEST:  No clinically significant abnormality identified in the visualized lower chest      LIVER/BILIARY TREE:  Subtle pneumobilia in the left lobe of the liver not seen on prior exams  Differential is broad; has the patient had a procedure? Recommend correlation for evaluation of cholangitis        GALLBLADDER:  Gallbladder is surgically absent      SPLEEN:  Unremarkable      PANCREAS:  Unremarkable      ADRENAL GLANDS:  Unremarkable      KIDNEYS/URETERS:  Unremarkable  No hydronephrosis      STOMACH AND BOWEL:  There is colonic diverticulosis without evidence of acute diverticulitis      APPENDIX:  No findings to suggest appendicitis      ABDOMINOPELVIC CAVITY:  No ascites  No pneumoperitoneum  No lymphadenopathy      VESSELS:  Unremarkable for patient's age      PELVIS     REPRODUCTIVE ORGANS:  Unremarkable for patient's age      URINARY BLADDER:  Unremarkable      ABDOMINAL WALL/INGUINAL REGIONS:  Postsurgical changes in the anterior abdominal wal   l      OSSEOUS STRUCTURES:  No acute fracture or destructive osseous lesion  Postsurgical changes lumbar spine      IMPRESSION:     Subtle pneumobilia in the left lobe of the liver not seen on prior exams  Differential is broad; has the patient had a procedure? Recommend correlation for evaluation of cholangitis              I personally discussed this study with [address] [place] [address] on 6/18/2022 at 2:25 AM                     Workstation performed: [id number]      Final Result by Demario Bedolla MD (06/18 0225)      Subtle pneumobilia in the left lobe of the liver not seen on prior exams  Differential is broad; has the patient had a procedure? Recommend correlation for evaluation of cholangitis                I personally discussed this study with [address] [place] [address] on 6/18/2022 at 2:25 AM                      Workstation performed: [id number]               Results from last 7 days   Lab Units 06/18/22  2344 06/18/22  0042   WBC Thousand/uL 13 30* 16 90*   HEMOGLOBIN g/dL 10 8* 13 7   HEMATOCRIT % 35 4 44 3   PLATELETS Thousands/uL 253 342   NEUTROS ABS Thousands/µL 7 63* 12 87*         Results from last 7 days   Lab Units 06/18/22  2344 06/18/22  2250 06/18/22  1701 06/18/22  0042   SODIUM mmol/L 140  --  140 137   POTASSIUM mmol/L 4 3  4 3  --  2 5* 3 8   CHLORIDE mmol/L 113*  --  117* 104   CO2 mmol/L 20*  --  15* 23   ANION GAP mmol/L 7  --  8 10   BUN mg/dL 15  --  12 18   CREATININE mg/dL 1 17  --  0 92 1 36*   EGFR ml/min/1 73sq m 44  --  59 37   CALCIUM mg/dL 8 8  --  4 8* 8 7   CALCIUM, IONIZED mmol/L  --  1 25  --   --    MAGNESIUM mg/dL 1 1*  --   --   --      Results from last 7 days   Lab Units 06/18/22  1701 06/18/22  0042   AST U/L 17 12*   ALT U/L 5* 8   ALK PHOS U/L 67 139*   TOTAL PROTEIN g/dL 3 3* 6 2*   ALBUMIN g/dL 1 8* 3 4*   TOTAL BILIRUBIN mg/dL 0 19* 0 29     Results from last 7 days   Lab Units 06/19/22  1559 06/19/22  1115 06/19/22  0727 06/18/22  1620 06/18/22  1114 06/18/22  0731   POC GLUCOSE mg/dl 175* 142* 115 142* 140 179*     Results from last 7 days   Lab Units 06/18/22  2344 06/18/22  1701 06/18/22  0042   GLUCOSE RANDOM mg/dL 115 99 225*               Results from last 7 days   Lab Units 06/18/22  0042   PROTIME seconds 15 6*   INR  1 25*   PTT seconds 24         Results from last 7 days   Lab Units 06/19/22  0407 06/18/22  0042   PROCALCITONIN ng/ml 0 06 0 07     Results from last 7 days   Lab Units 06/18/22  0256 06/18/22  0042   LACTIC ACID mmol/L 1 5 2 9*                         Results from last 7 days   Lab Units 06/18/22  0042   LIPASE u/L 16                 Results from last 7 days   Lab Units 06/18/22  0912   CLARITY UA  Cloudy   COLOR UA  Yellow   SPEC GRAV UA  >=1 030   PH UA  6 0   GLUCOSE UA mg/dl Negative   KETONES UA mg/dl Negative   BLOOD UA  Large*   PROTEIN UA mg/dl 30 (1+)*   NITRITE UA  Negative   BILIRUBIN UA  Negative   UROBILINOGEN UA E U /dl 0 2   LEUKOCYTES UA  Trace*   WBC UA /hpf 2-4   RBC UA /hpf 2-4   BACTERIA UA /hpf Moderate*   EPITHELIAL CELLS WET PREP /hpf Moderate* Results from last 7 days   Lab Units 06/18/22  0537   C DIFF TOXIN B BY PCR  Negative             Results from last 7 days   Lab Units 06/18/22  0042   BLOOD CULTURE  No Growth at 24 hrs  No Growth at 24 hrs                 ED Treatment:   Medication Administration from 06/18/2022 0019 to 06/18/2022 0420       Date/Time Order Dose Route Action     06/18/2022 0026 ondansetron (FOR EMS ONLY) (ZOFRAN) 4 mg/2 mL injection 4 mg 0 mg Does not apply Given to EMS     06/18/2022 0046 sodium chloride 0 9 % bolus 500 mL 500 mL Intravenous New Bag     06/18/2022 0304 sodium chloride 0 9 % infusion 125 mL/hr Intravenous New Bag     06/18/2022 0046 ondansetron (ZOFRAN) injection 4 mg 4 mg Intravenous Given     06/18/2022 0046 Famotidine (PF) (PEPCID) injection 20 mg 20 mg Intravenous Given     06/18/2022 0046 HYDROmorphone (DILAUDID) injection 0 5 mg 0 5 mg Intravenous Given     06/18/2022 0207 levofloxacin (LEVAQUIN) IVPB (premix in dextrose) 750 mg 150 mL 750 mg Intravenous New Bag     06/18/2022 0140 metroNIDAZOLE (FLAGYL) IVPB (premix) 500 mg 100 mL 500 mg Intravenous New Bag     06/18/2022 0140 sodium chloride 0 9 % bolus 500 mL 500 mL Intravenous New Bag     06/18/2022 0202 promethazine (PHENERGAN) injection 12 5 mg 12 5 mg Intravenous Given     06/18/2022 0303 diphenhydrAMINE (BENADRYL) injection 25 mg 25 mg Intravenous Given        Past Medical History:   Diagnosis Date    Acid reflux     Anemia     hx of iron-deficient    Anxiety     Arthritis     Asthma     last needed inhaler last year 2020    Basal cell carcinoma     upper lip    Chronic narcotic dependence (HCC)     Chronic pain     Colon polyp     Cystocele     Diabetes mellitus ([address] )     stable    Disease of thyroid gland     hypothyroidism    Diverticulosis     Dysfunctional uterine bleeding     last assessed - [id number]    Fibromyalgia     Gastric ulcer     Gastroparesis     History of colonic polyps     last assessed - [id number]    History of gastroesophageal reflux (GERD)     Hypercholesterolemia     Hyperlipidemia     Hypertension     IBS (irritable bowel syndrome)     Post laminectomy syndrome     Seasonal allergies     Spinal stenosis      Present on Admission:   Overactive bladder   Dyspepsia   Chronic pain disorder   Hypertension   Hypothyroidism   Anxiety      Admitting Diagnosis: Diarrhea [R19 7]  Dehydration [E86 0]  Abdominal pain [R10 9]  Elevated serum creatinine [R79 89]  Pneumobilia [K83 8]  Nausea vomiting and diarrhea [R11 2, R19 7]  Severe sepsis (HCC) [A41 9, R65 20]  Age/Sex: 76 y o  female  Admission Orders:  Scheduled Medications:  aspirin, 81 mg, Oral, Daily  atorvastatin, 40 mg, Oral, Daily  cefTRIAXone, 1,000 mg, Intravenous, Q24H  escitalopram, 10 mg, Oral, Daily  gabapentin, 400 mg, Oral, HS  heparin (porcine), 5,000 Units, Subcutaneous, Q8H ARY  insulin lispro, 1-6 Units, Subcutaneous, TID AC  latanoprost, 1 drop, Both Eyes, HS  levothyroxine, 37 5 mcg, Oral, Early Morning  metoprolol tartrate, 12 5 mg, Oral, Q12H ARY  metroNIDAZOLE, 500 mg, Intravenous, Q8H  Milnacipran HCl, 1 tablet, Oral, Daily  nystatin, , Topical, BID  oxybutynin, 5 mg, Oral, BID  pantoprazole, 40 mg, Oral, Daily  saccharomyces boulardii, 250 mg, Oral, BID    calcium gluconate 2 g in sodium chloride 0 9% 100 mL (premix)  Dose: 2 g  Freq: Once Route: IV  Last Dose: Stopped (06/18/22 2353)  Start: 06/18/22 2030 End: 06/18/22 2353  calcium gluconate 1 g in sodium chloride 0 9% 50 mL (premix)  Dose: 1 g  Freq: Once Route: IV  Last Dose: Stopped (06/18/22 2353)  Start: 06/18/22 2030 End: 06/18/22 2353  magnesium sulfate 4 g/100 mL IVPB (premix) 4 g  Dose: 4 g  Freq:  Once Route: IV  Start: 06/19/22 0330 End: 06/19/22 0841  potassium chloride (K-DUR,KLOR-CON) CR tablet 40 mEq  Dose: 40 mEq  Freq: Every 4 hours Route: PO  Indications of Use: HYPOKALEMIA  Start: 06/18/22 1900 End: 06/19/22 0312  potassium chloride 20 mEq IVPB (premix)  Dose: 20 mEq  Freq: Every 4 hours Route: IV  Last Dose: 20 mEq (06/18/22 2350)  Start: 06/18/22 1900 End: 06/19/22 0150            Continuous IV Infusions:  sodium chloride, 100 mL/hr, Intravenous, Continuous      PRN Meds:  acetaminophen, 650 mg, Oral, Q6H PRN  albuterol, 2 puff, Inhalation, Q6H PRN  ALPRAZolam, 0 25 mg, Oral, TID PRN  hydrALAZINE, 10 mg, Intravenous, Q6H PRN  meclizine, 25 mg, Oral, 4x Daily PRN  oxyCODONE-acetaminophen, 1 tablet, Oral, Q6H PRN x2 6/18, x3 6/19  trimethobenzamide, 200 mg, Intramuscular, Q6H PRN x1 6/18  zolpidem, 5 mg, Oral, HS PRN x2 6/18    telemetry  SCD   hypoglycemia monitoring  Surgical soft  Lite meal, low fiber, low residue, lactose restricted   IP CONSULT TO ACUTE CARE SURGERY  IP CONSULT TO GASTROENTEROLOGY    Network Utilization Review Department  ATTENTION: Please call with any questions or concerns to [phone] and carefully listen to the prompts so that you are directed to the right person  All voicemails are confidential   Amaury Santoyo all requests for admission clinical reviews, approved or denied determinations and any other requests to dedicated fax number below belonging to the campus where the patient is receiving treatment  List of dedicated fax numbers for the Facilities:  [address] DENIALS (Administrative/Medical Necessity) [phone]   [address] (Maternity/NICU/Pediatrics) [phone] [address] 125 [facility]  [phone]   [address] [facility] [place] [address] [address] [address] [phone]   [address] [place] [address]   [address] [id number] [phone]

## 2022-06-21 VITALS
HEIGHT: 60 IN | SYSTOLIC BLOOD PRESSURE: 135 MMHG | OXYGEN SATURATION: 94 % | RESPIRATION RATE: 16 BRPM | WEIGHT: 175.27 LBS | BODY MASS INDEX: 34.41 KG/M2 | TEMPERATURE: 98.4 F | HEART RATE: 70 BPM | DIASTOLIC BLOOD PRESSURE: 51 MMHG

## 2022-06-21 DIAGNOSIS — M96.1 POST LAMINECTOMY SYNDROME: ICD-10-CM

## 2022-06-21 DIAGNOSIS — M54.50 LUMBAR BACK PAIN: ICD-10-CM

## 2022-06-21 PROBLEM — E83.42 HYPOMAGNESEMIA: Status: ACTIVE | Noted: 2022-06-21

## 2022-06-21 PROBLEM — N18.9 ACUTE-ON-CHRONIC KIDNEY INJURY (HCC): Status: RESOLVED | Noted: 2022-06-18 | Resolved: 2022-06-21

## 2022-06-21 PROBLEM — N17.9 ACUTE-ON-CHRONIC KIDNEY INJURY (HCC): Status: RESOLVED | Noted: 2022-06-18 | Resolved: 2022-06-21

## 2022-06-21 PROBLEM — D64.9 ANEMIA: Status: ACTIVE | Noted: 2022-06-21

## 2022-06-21 LAB
ANION GAP SERPL CALCULATED.3IONS-SCNC: 7 MMOL/L (ref 4–13)
BUN SERPL-MCNC: 13 MG/DL (ref 5–25)
CALCIUM SERPL-MCNC: 8 MG/DL (ref 8.4–10.2)
CHLORIDE SERPL-SCNC: 110 MMOL/L (ref 96–108)
CO2 SERPL-SCNC: 21 MMOL/L (ref 21–32)
CREAT SERPL-MCNC: 1.03 MG/DL (ref 0.6–1.3)
ERYTHROCYTE [DISTWIDTH] IN BLOOD BY AUTOMATED COUNT: 15.6 % (ref 11.6–15.1)
GFR SERPL CREATININE-BSD FRML MDRD: 51 ML/MIN/1.73SQ M
GLUCOSE SERPL-MCNC: 138 MG/DL (ref 65–140)
GLUCOSE SERPL-MCNC: 158 MG/DL (ref 65–140)
GLUCOSE SERPL-MCNC: 162 MG/DL (ref 65–140)
HCT VFR BLD AUTO: 33.7 % (ref 34.8–46.1)
HGB BLD-MCNC: 10.3 G/DL (ref 11.5–15.4)
MAGNESIUM SERPL-MCNC: 1.4 MG/DL (ref 1.9–2.7)
MCH RBC QN AUTO: 26.9 PG (ref 26.8–34.3)
MCHC RBC AUTO-ENTMCNC: 30.6 G/DL (ref 31.4–37.4)
MCV RBC AUTO: 88 FL (ref 82–98)
PLATELET # BLD AUTO: 233 THOUSANDS/UL (ref 149–390)
PMV BLD AUTO: 10.8 FL (ref 8.9–12.7)
POTASSIUM SERPL-SCNC: 4.4 MMOL/L (ref 3.5–5.3)
RBC # BLD AUTO: 3.83 MILLION/UL (ref 3.81–5.12)
SODIUM SERPL-SCNC: 138 MMOL/L (ref 135–147)
WBC # BLD AUTO: 15.09 THOUSAND/UL (ref 4.31–10.16)

## 2022-06-21 PROCEDURE — 83735 ASSAY OF MAGNESIUM: CPT | Performed by: INTERNAL MEDICINE

## 2022-06-21 PROCEDURE — 82948 REAGENT STRIP/BLOOD GLUCOSE: CPT

## 2022-06-21 PROCEDURE — 85027 COMPLETE CBC AUTOMATED: CPT | Performed by: INTERNAL MEDICINE

## 2022-06-21 PROCEDURE — 80048 BASIC METABOLIC PNL TOTAL CA: CPT | Performed by: INTERNAL MEDICINE

## 2022-06-21 PROCEDURE — 99239 HOSP IP/OBS DSCHRG MGMT >30: CPT | Performed by: INTERNAL MEDICINE

## 2022-06-21 RX ORDER — OXYCODONE AND ACETAMINOPHEN 10; 325 MG/1; MG/1
1 TABLET ORAL EVERY 6 HOURS PRN
Qty: 100 TABLET | Refills: 0 | Status: SHIPPED | OUTPATIENT
Start: 2022-06-21 | End: 2022-06-21 | Stop reason: SDUPTHER

## 2022-06-21 RX ORDER — LEVOTHYROXINE SODIUM 0.03 MG/1
37.5 TABLET ORAL DAILY
Qty: 45 TABLET | Refills: 0 | Status: SHIPPED | OUTPATIENT
Start: 2022-06-21 | End: 2022-08-09 | Stop reason: SDUPTHER

## 2022-06-21 RX ORDER — MAGNESIUM SULFATE HEPTAHYDRATE 40 MG/ML
4 INJECTION, SOLUTION INTRAVENOUS ONCE
Status: COMPLETED | OUTPATIENT
Start: 2022-06-21 | End: 2022-06-21

## 2022-06-21 RX ORDER — NYSTATIN 100000 [USP'U]/G
POWDER TOPICAL 2 TIMES DAILY
Qty: 15 G | Refills: 0 | Status: SHIPPED | OUTPATIENT
Start: 2022-06-21

## 2022-06-21 RX ORDER — LANOLIN ALCOHOL/MO/W.PET/CERES
400 CREAM (GRAM) TOPICAL 2 TIMES DAILY
Qty: 30 TABLET | Refills: 0 | Status: SHIPPED | OUTPATIENT
Start: 2022-06-21

## 2022-06-21 RX ORDER — ASPIRIN 81 MG/1
81 TABLET, CHEWABLE ORAL DAILY
Qty: 30 TABLET | Refills: 0 | Status: SHIPPED | OUTPATIENT
Start: 2022-06-21 | End: 2022-08-09 | Stop reason: SDUPTHER

## 2022-06-21 RX ORDER — MECLIZINE HYDROCHLORIDE 25 MG/1
25 TABLET ORAL 4 TIMES DAILY PRN
Qty: 60 TABLET | Refills: 0 | Status: SHIPPED | OUTPATIENT
Start: 2022-06-21 | End: 2022-07-31

## 2022-06-21 RX ORDER — LISINOPRIL 20 MG/1
20 TABLET ORAL DAILY
Qty: 30 TABLET | Refills: 0 | Status: SHIPPED | OUTPATIENT
Start: 2022-06-24 | End: 2022-10-22

## 2022-06-21 RX ORDER — OXYCODONE AND ACETAMINOPHEN 10; 325 MG/1; MG/1
1 TABLET ORAL EVERY 6 HOURS PRN
Qty: 15 TABLET | Refills: 0 | Status: SHIPPED | OUTPATIENT
Start: 2022-06-21 | End: 2022-06-21 | Stop reason: SDUPTHER

## 2022-06-21 RX ORDER — SACCHAROMYCES BOULARDII 250 MG
250 CAPSULE ORAL 2 TIMES DAILY
Qty: 90 CAPSULE | Refills: 0 | Status: SHIPPED | OUTPATIENT
Start: 2022-06-21

## 2022-06-21 RX ORDER — OXYCODONE AND ACETAMINOPHEN 10; 325 MG/1; MG/1
1 TABLET ORAL EVERY 6 HOURS PRN
Refills: 0
Start: 2022-06-21 | End: 2022-06-22 | Stop reason: SDUPTHER

## 2022-06-21 RX ADMIN — OXYCODONE HYDROCHLORIDE AND ACETAMINOPHEN 1 TABLET: 5; 325 TABLET ORAL at 10:40

## 2022-06-21 RX ADMIN — METRONIDAZOLE 500 MG: 500 INJECTION, SOLUTION INTRAVENOUS at 02:26

## 2022-06-21 RX ADMIN — METRONIDAZOLE 500 MG: 500 INJECTION, SOLUTION INTRAVENOUS at 10:36

## 2022-06-21 RX ADMIN — ASPIRIN 81 MG CHEWABLE TABLET 81 MG: 81 TABLET CHEWABLE at 08:31

## 2022-06-21 RX ADMIN — Medication 250 MG: at 08:30

## 2022-06-21 RX ADMIN — OXYBUTYNIN CHLORIDE 5 MG: 5 TABLET ORAL at 08:30

## 2022-06-21 RX ADMIN — ATORVASTATIN CALCIUM 40 MG: 40 TABLET, FILM COATED ORAL at 08:31

## 2022-06-21 RX ADMIN — METOPROLOL TARTRATE 12.5 MG: 25 TABLET, FILM COATED ORAL at 08:30

## 2022-06-21 RX ADMIN — INSULIN LISPRO 1 UNITS: 100 INJECTION, SOLUTION INTRAVENOUS; SUBCUTANEOUS at 13:41

## 2022-06-21 RX ADMIN — HEPARIN SODIUM 5000 UNITS: 5000 INJECTION INTRAVENOUS; SUBCUTANEOUS at 13:41

## 2022-06-21 RX ADMIN — PANTOPRAZOLE SODIUM 40 MG: 40 TABLET, DELAYED RELEASE ORAL at 08:30

## 2022-06-21 RX ADMIN — HEPARIN SODIUM 5000 UNITS: 5000 INJECTION INTRAVENOUS; SUBCUTANEOUS at 05:03

## 2022-06-21 RX ADMIN — CEFTRIAXONE 1000 MG: 1 INJECTION, POWDER, FOR SOLUTION INTRAMUSCULAR; INTRAVENOUS at 05:03

## 2022-06-21 RX ADMIN — ESCITALOPRAM OXALATE 10 MG: 10 TABLET ORAL at 08:31

## 2022-06-21 RX ADMIN — NYSTATIN: 100000 POWDER TOPICAL at 08:30

## 2022-06-21 RX ADMIN — LEVOTHYROXINE SODIUM 37.5 MCG: 75 TABLET ORAL at 05:03

## 2022-06-21 RX ADMIN — MAGNESIUM SULFATE HEPTAHYDRATE 4 G: 40 INJECTION, SOLUTION INTRAVENOUS at 11:22

## 2022-06-21 RX ADMIN — OXYCODONE HYDROCHLORIDE AND ACETAMINOPHEN 1 TABLET: 5; 325 TABLET ORAL at 04:18

## 2022-06-21 NOTE — ASSESSMENT & PLAN NOTE
- Evidenced by Leukocytosis + tachycardia + lactic acid 2 9   - possible source : colitis  - Ct abdomen : There is colonic diverticulosis without evidence of acute diverticulitis     - s/p levoquin and flagyl in the ED + IV fluids, reflex lactic wnl    Plan  Continue flagyl + rocephin   IV fluid hydration  Follow blood cultures   Monitor cbc
- Evidenced by Leukocytosis + tachycardia + lactic acid 2 9   - possible source : colitis  - Ct abdomen : There is colonic diverticulosis without evidence of acute diverticulitis     - s/p levoquin and flagyl in the ED + IV fluids, reflex lactic wnl    Plan  Will dc levoquin,  Continue flagyl + rocephin   IV fluid hydration  Follow blood cultures   Monitor cbc
- POA: multiple episodes of non bloody diarrhea for the past 3 days  - assoc sx: nausea, emesis, abd cramping  - (-) recent antibiotic use, foods unknown procedence, travels  - most recent colonoscopy 7/13/21: left sided diverticulosis , no need for repeat due to age    - initial therapy cholestyramine with partial improvement of symptoms    Plan  Continue antibiotic  C diff negative  Enteric panel pending
- POA: multiple episodes of non bloody diarrhea for the past 3 days  - assoc sx: nausea, emesis, abd cramping  - (-) recent antibiotic use, foods unknown procedence, travels  - most recent colonoscopy 7/13/21: left sided diverticulosis , no need for repeat due to age    - initial therapy cholestyramine with partial improvement of symptoms    Plan  IV AB therapy rocephin and flagyl for now  IV fluid hydration  Tigan for N/V  Stool enteric studies  C diff test
- QT/QTc  366/511 prior 408/527  - will give Tigan for nausea Tx
- QT/QTc  366/511 prior 408/527  - will give Tigan for nausea Tx
- continue levothyroxine
- continue percocet prn, gabapentin
- continue percocet prn, gabapentin
- sbp noted in the 170 range  - home regimen : lisinopril , metoprolol  - will hold lisinopril in the setting of SDAAF  - continue metoprolol  - hydralazine added 
-Ct abdomen : Subtle pneumobilia in the left lobe of the liver not seen on prior exams  Recommend correlation for evaluation of cholangitis    - no recent h/o surgical procedures/interventions    Plan  General surgery consult
-Ct abdomen : Subtle pneumobilia in the left lobe of the liver not seen on prior exams  Recommend correlation for evaluation of cholangitis    - no recent h/o surgical procedures/interventions    Plan  General surgery consult  Appreciate their input
-continue levothyroxine
/60
/77
Continue home metoprolol  Hold home lisinopril for additional 3 days
Continue minalcipran and escitalopram
Continue oxybutin
Continue protonix
Ct abdomen : Subtle pneumobilia in the left lobe of the liver not seen on prior exams  Recommend correlation for evaluation of cholangitis    no recent h/o surgical procedures/interventions    Plan  General surgery consult  Appreciate their input
Ct abdomen : Subtle pneumobilia in the left lobe of the liver not seen on prior exams  Recommend correlation for evaluation of cholangitis    no recent h/o surgical procedures/interventions  General surgery and GI on board, deemed to be incidental  No follow-up necessary
Evidenced by Leukocytosis + tachycardia + lactic acid 2 9   possible source : colitis  Ct abdomen : There is colonic diverticulosis without evidence of acute diverticulitis     Received IVF, Completed 5 days ceftriaxone and Flagyl
Evidenced by Leukocytosis + tachycardia + lactic acid 2 9   possible source : colitis  Ct abdomen : There is colonic diverticulosis without evidence of acute diverticulitis     s/p levoquin and flagyl in the ED + IV fluids, reflex lactic wnl    Plan  Continue flagyl + rocephin   IV fluid hydration  Follow blood cultures   Monitor cbc
Lab Results   Component Value Date    EGFR 37 06/18/2022    EGFR 51 04/25/2022    EGFR 46 04/24/2022    CREATININE 1 36 (H) 06/18/2022    CREATININE 1 04 04/25/2022    CREATININE 1 12 04/24/2022     - POA : Creatininine 1 3  - baseline seems to be 0 9-1 1  - no other electrolyte imbalances, appears dry on exam  - possible etiologies : prerenal in the setting of volume depletion , ATN in the setting of sepsis  Plan  IV fluid hydration  Will hold patients home lisinopril, baclofen and pyridium  Monitor kidney indices  Avoid hypotension  Avoid nephrotoxins
Lab Results   Component Value Date    EGFR 44 06/18/2022    EGFR 59 06/18/2022    EGFR 37 06/18/2022    CREATININE 1 17 06/18/2022    CREATININE 0 92 06/18/2022    CREATININE 1 36 (H) 06/18/2022     Continue to monitor kidney function  For now will hold off on lab work today as the patient had BMP at 11:00 p m   And is a hard stick  Will repeat labs in the morning
Lab Results   Component Value Date    EGFR 45 06/20/2022    EGFR 44 06/18/2022    EGFR 59 06/18/2022    CREATININE 1 14 06/20/2022    CREATININE 1 17 06/18/2022    CREATININE 0 92 06/18/2022     Continue to monitor kidney function  For now will hold off on lab work today as the patient had BMP at 11:00 p m   And is a hard stick  Will repeat labs in the morning
Lab Results   Component Value Date    EGFR 51 06/21/2022    EGFR 45 06/20/2022    EGFR 44 06/18/2022    CREATININE 1 03 06/21/2022    CREATININE 1 14 06/20/2022    CREATININE 1 17 06/18/2022     prerenal  Received IVF  Resolved
Lab Results   Component Value Date    HGBA1C 8 6 (H) 04/22/2022        home regimen metformin will be held while in hospital   SSI initiated    daily glucose checks  hypoglycemia protocol  clear liquid diet now, once better tolerance carbohydrate controlled diet  blood sugar goal 140-180    (P) [phone] [id number]
Lab Results   Component Value Date    HGBA1C 8 6 (H) 04/22/2022       - home regimen metformin will be held while in hospital  - SSI initiated   - daily glucose checks  - hypoglycemia protocol  - clear liquid diet now, once better tolerance carbohydrate controlled diet  -blood sugar goal 140-180
Lab Results   Component Value Date    HGBA1C 8 6 (H) 04/22/2022       - home regimen metformin will be held while in hospital  - SSI initiated   - daily glucose checks  - hypoglycemia protocol  - clear liquid diet now, once better tolerance carbohydrate controlled diet  -blood sugar goal 140-180    (P) 143 6
Lab Results   Component Value Date    HGBA1C 8 6 (H) 04/22/2022       Restart home metformin  DM diet   ISS    (P) 149
Likely secondary to diarrhea  Continue replete and monitor  Will send home with 400 mg Mag
POA: multiple episodes of non bloody diarrhea for the past 3 days  assoc sx: nausea, emesis, abd cramping  (-) recent antibiotic use, foods unknown procedence, travels  most recent colonoscopy 7/13/21: left sided diverticulosis , no need for repeat due to age     initial therapy cholestyramine with partial improvement of symptoms    Plan  Continue antibiotic  C diff negative  Enteric panel pending
POA: multiple episodes of non bloody diarrhea for the past 3 days  assoc sx: nausea, emesis, abd cramping  (-) recent antibiotic use, foods unknown procedence, travels  most recent colonoscopy 7/13/21: left sided diverticulosis , no need for repeat due to age    initial therapy cholestyramine with partial improvement of symptoms    Completed antibiotic and IVF  Plan  Follow-up patient GI if needed
QT/QTc  366/511 prior 408/527
QT/QTc  366/511 prior 408/527  will give Tigan for nausea Tx
continue percocet prn, gabapentin
continue percocet prn, gabapentin
no

## 2022-06-21 NOTE — DISCHARGE INSTR - AVS FIRST PAGE
Dear Nancy Brown,     It was our pleasure to care for you here at [facility]  It is our hope that we were always able to exceed the expected standards for your care during your stay  You were hospitalized due to diarrhea  You were cared for on the 4th floor by Eyal Baldwin MD under the service of [facility]* with the [facility] who covers for your primary care physician (PCP), Elizabeth Johnson, while you were hospitalized  If you have any questions or concerns related to this hospitalization, you may contact us at [phone]  For follow up as well as any medication refills, we recommend that you follow up with your primary care physician  A registered nurse will reach out to you by phone within a few days after your discharge to answer any additional questions that you may have after going home  However, at this time we provide for you here, the most important instructions / recommendations at discharge:     Notable Medication Adjustments -   Hold lisinopril for additional 3 days  Take probiotic for diarrhea  Testing Required after Discharge -   Blood test to check electrolytes and renal function  Check white count   Important follow up information -   Follow PCP  GI team referral provided  Other Instructions -   Stay hydrated in bed  Please review this entire after visit summary as additional general instructions including medication list, appointments, activity, diet, any pertinent wound care, and other additional recommendations from your care team that may be provided for you        Sincerely,     Eyal Baldwin MD

## 2022-06-21 NOTE — DISCHARGE SUMMARY
[facility]  Discharge- Oanh Muscat 1945, 76 y o  female MRN: [id number]  Unit/Bed#: S -01 Encounter: [id number]  Primary Care Provider: NANY Milton   Date and time admitted to hospital: 6/18/2022 12:19 AM    * Sepsis [facility])  Assessment & Plan  Evidenced by Leukocytosis + tachycardia + lactic acid 2 9   possible source : colitis  Ct abdomen : There is colonic diverticulosis without evidence of acute diverticulitis  Received IVF, Completed 5 days ceftriaxone and Flagyl    Diarrhea  Assessment & Plan  POA: multiple episodes of non bloody diarrhea for the past 3 days  assoc sx: nausea, emesis, abd cramping  (-) recent antibiotic use, foods unknown procedence, travels  most recent colonoscopy 7/13/21: left sided diverticulosis , no need for repeat due to age  initial therapy cholestyramine with partial improvement of symptoms    Completed antibiotic and IVF  Plan  Follow-up patient GI if needed    Acute-on-chronic kidney injury (HCC)-resolved as of 6/21/2022  Assessment & Plan  Lab Results   Component Value Date    EGFR 51 06/21/2022    EGFR 45 06/20/2022    EGFR 44 06/18/2022    CREATININE 1 03 06/21/2022    CREATININE 1 14 06/20/2022    CREATININE 1 17 06/18/2022     prerenal  Received IVF  Resolved    Pneumobilia  Assessment & Plan  Ct abdomen : Subtle pneumobilia in the left lobe of the liver not seen on prior exams  Recommend correlation for evaluation of cholangitis    no recent h/o surgical procedures/interventions  General surgery and GI on board, deemed to be incidental  No follow-up necessary    Prolonged Q-T interval on ECG  Assessment & Plan  QT/QTc  366/511 prior 408/527    Hypomagnesemia  Assessment & Plan  Likely secondary to diarrhea  Continue replete and monitor  Will send home with 400 mg Mag    Overactive bladder  Assessment & Plan  Continue oxybutin    Hypothyroidism  Assessment & Plan  -continue levothyroxine    Anxiety  Assessment & Plan  Continue minalcipran and escitalopram    Hypertension  Assessment & Plan  Continue home metoprolol  Hold home lisinopril for additional 3 days    Dyspepsia  Assessment & Plan  Continue protonix    Chronic pain disorder  Assessment & Plan  continue percocet prn, gabapentin    Type 2 diabetes mellitus (HCC)  Assessment & Plan  Lab Results   Component Value Date    HGBA1C 8 6 (H) 04/22/2022       Restart home metformin  DM diet   ISS    (P) 149        Medical Problems             Resolved Problems  Date Reviewed: 6/21/2022          Resolved    Acute-on-chronic kidney injury ([facility] [address] ) 6/21/2022     Resolved by  Carli Chapa MD              Discharging Resident: Carli Chapa MD  Discharging Attending: Josiah Lan*  PCP: Micheline Medrano, [facility]  Admission Date:   Admission Orders (From admission, onward)     Ordered        06/18/22 0329  Inpatient Admission  Once                      Discharge Date: 06/21/22    Consultations During Hospital Stay:  · Surgery  · GI    Procedures Performed:   · None    Significant Findings / Test Results:   CT abdomen pelvis wo contrast    Result Date: 6/18/2022  Impression: Subtle pneumobilia in the left lobe of the liver not seen on prior exams  Differential is broad; has the patient had a procedure? Recommend correlation for evaluation of cholangitis     I personally discussed this study with Flip Gibson on 6/18/2022 at 2:25 AM  Workstation performed: [id number]     XR chest 1 view portable    Result Date: 6/18/2022  Impression: No acute cardiopulmonary disease  Workstation performed: [id number]     US right upper quadrant    Result Date: 6/19/2022  Impression: Status post cholecystectomy  No common bile duct dilatation  Workstation performed: [id number]       No Chest XR results available for this patient  Incidental Findings:   · Pneumobilia    Test Results Pending at Discharge (will require follow up):   · None     Outpatient Tests Requested:  · BMP in 5 days    Complications:  None    Reason for Admission:  Diarrhea    Hospital Course: Giles Savage is a 76 y o  female patient who originally presented to the hospital on 6/18/2022 due to diarrhea  Likely infectious, treated with IVF and 5 days of antibiotics  GI team on board  Incidentally found pneumobilia, surgery team consulted  No follow-up  PT OT recommended has TR, patient declined   offered VNA and home PT, patient declined  Please see above list of diagnoses and related plan for additional information  Condition at Discharge: good    Discharge Day Visit / Exam:   Subjective:  Eating and drinking well  No nausea, vomiting, abdominal pain  Last bowel movement 2 days ago  Vitals: Blood Pressure: 135/51 (06/21/22 1405)  Pulse: 70 (06/21/22 1405)  Temperature: 98 4 °F (36 9 °C) (06/21/22 1405)  Temp Source: Oral (06/18/22 2358)  Respirations: 16 (06/21/22 1405)  Height: 5' (152 4 cm) (06/18/22 0300)  Weight - Scale: 79 5 kg (175 lb 4 3 oz) (06/18/22 0300)  SpO2: 94 % (06/21/22 1405)  Exam:   Physical Exam  Vitals and nursing note reviewed  Constitutional:       General: She is not in acute distress  Appearance: Normal appearance  She is well-developed  She is not ill-appearing or diaphoretic  HENT:      Head: Normocephalic and atraumatic  Cardiovascular:      Rate and Rhythm: Normal rate and regular rhythm  Pulses: Normal pulses  Heart sounds: Normal heart sounds  No murmur heard  Pulmonary:      Effort: Pulmonary effort is normal  No respiratory distress  Breath sounds: Normal breath sounds  Abdominal:      General: Bowel sounds are normal  There is no distension  Palpations: Abdomen is soft  Tenderness: There is no abdominal tenderness  Musculoskeletal:      Cervical back: Neck supple  Right lower leg: No edema  Left lower leg: No edema  Skin:     General: Skin is warm and dry     Neurological:      General: No focal deficit present  Mental Status: She is alert and oriented to person, place, and time  Mental status is at baseline  Psychiatric:         Mood and Affect: Mood normal          Behavior: Behavior normal          Thought Content: Thought content normal          Judgment: Judgment normal           Discussion with Family: Updated  (daughter) via phone  Discharge instructions/Information to patient and family:   See after visit summary for information provided to patient and family  Provisions for Follow-Up Care:  See after visit summary for information related to follow-up care and any pertinent home health orders  Disposition:   Home    Planned Readmission: no    Discharge Medications:  See after visit summary for reconciled discharge medications provided to patient and/or family        **Please Note: This note may have been constructed using a voice recognition system**

## 2022-06-22 DIAGNOSIS — M54.50 LUMBAR BACK PAIN: ICD-10-CM

## 2022-06-22 DIAGNOSIS — M96.1 POST LAMINECTOMY SYNDROME: ICD-10-CM

## 2022-06-22 RX ORDER — OXYCODONE AND ACETAMINOPHEN 10; 325 MG/1; MG/1
1 TABLET ORAL EVERY 6 HOURS PRN
Refills: 0
Start: 2022-06-22 | End: 2022-06-22 | Stop reason: SDUPTHER

## 2022-06-22 RX ORDER — OXYCODONE AND ACETAMINOPHEN 10; 325 MG/1; MG/1
1 TABLET ORAL EVERY 6 HOURS PRN
Qty: 100 TABLET | Refills: 0 | Status: SHIPPED | OUTPATIENT
Start: 2022-06-22 | End: 2022-07-22 | Stop reason: SDUPTHER

## 2022-06-23 ENCOUNTER — TRANSITIONAL CARE MANAGEMENT (OUTPATIENT)
Dept: FAMILY MEDICINE CLINIC | Facility: CLINIC | Age: 77
End: 2022-06-23

## 2022-06-23 LAB
BACTERIA BLD CULT: NORMAL
BACTERIA BLD CULT: NORMAL

## 2022-06-27 DIAGNOSIS — K59.1 FUNCTIONAL DIARRHEA: Primary | ICD-10-CM

## 2022-06-27 RX ORDER — CHOLESTYRAMINE 4 G/9G
1 POWDER, FOR SUSPENSION ORAL
Qty: 30 PACKET | Refills: 3 | Status: SHIPPED | OUTPATIENT
Start: 2022-06-27 | End: 2022-06-29 | Stop reason: SDUPTHER

## 2022-06-29 ENCOUNTER — OFFICE VISIT (OUTPATIENT)
Dept: FAMILY MEDICINE CLINIC | Facility: CLINIC | Age: 77
End: 2022-06-29
Payer: COMMERCIAL

## 2022-06-29 VITALS
WEIGHT: 185 LBS | TEMPERATURE: 97.6 F | OXYGEN SATURATION: 95 % | HEIGHT: 60 IN | SYSTOLIC BLOOD PRESSURE: 120 MMHG | HEART RATE: 97 BPM | BODY MASS INDEX: 36.32 KG/M2 | DIASTOLIC BLOOD PRESSURE: 58 MMHG

## 2022-06-29 DIAGNOSIS — J45.20 MILD INTERMITTENT ASTHMA WITHOUT COMPLICATION: ICD-10-CM

## 2022-06-29 DIAGNOSIS — F51.01 PRIMARY INSOMNIA: ICD-10-CM

## 2022-06-29 DIAGNOSIS — R19.7 DIARRHEA, UNSPECIFIED TYPE: Primary | ICD-10-CM

## 2022-06-29 DIAGNOSIS — F32.9 REACTIVE DEPRESSION: ICD-10-CM

## 2022-06-29 DIAGNOSIS — G62.9 NEUROPATHY: ICD-10-CM

## 2022-06-29 PROCEDURE — 1111F DSCHRG MED/CURRENT MED MERGE: CPT | Performed by: NURSE PRACTITIONER

## 2022-06-29 PROCEDURE — 99495 TRANSJ CARE MGMT MOD F2F 14D: CPT | Performed by: NURSE PRACTITIONER

## 2022-06-29 RX ORDER — ESCITALOPRAM OXALATE 20 MG/1
10 TABLET ORAL DAILY
Qty: 30 TABLET | Refills: 5 | Status: SHIPPED | OUTPATIENT
Start: 2022-06-29

## 2022-06-29 RX ORDER — GABAPENTIN 300 MG/1
300 CAPSULE ORAL 2 TIMES DAILY
Qty: 60 CAPSULE | Refills: 5 | Status: SHIPPED | OUTPATIENT
Start: 2022-06-29

## 2022-06-29 RX ORDER — ALBUTEROL SULFATE 90 UG/1
2 AEROSOL, METERED RESPIRATORY (INHALATION) EVERY 6 HOURS PRN
Qty: 6.7 G | Refills: 5 | Status: SHIPPED | OUTPATIENT
Start: 2022-06-29

## 2022-06-29 RX ORDER — ZOLPIDEM TARTRATE 10 MG/1
5 TABLET ORAL
Qty: 30 TABLET | Refills: 0 | Status: SHIPPED | OUTPATIENT
Start: 2022-06-29 | End: 2022-08-09 | Stop reason: SDUPTHER

## 2022-06-29 NOTE — PROGRESS NOTES
TCM Call (since 5/30/2022)     Date and time call was made  6/23/2022  4:40 PM    Hospital care reviewed  Records reviewed    Patient was hospitialized at  [address] [place] [address]    Date of Admission  06/18/22    Date of discharge  06/21/22    Diagnosis  Sepsis    Disposition  Home    Were the patients medications reviewed and updated  Yes    Current Symptoms  None      TCM Call (since 5/30/2022)     Scheduled for follow up? Yes    Did you obtain your prescribed medications  Yes    Do you need help managing your prescriptions or medications  No    Is transportation to your appointment needed  Yes    Specify why  Friend or daughter will bring patient to offie visit    I have advised the patient to call PCP with any new or worsening symptoms  [address] [place] [address] Drive    Are you recieving any outpatient services  No    Are you recieving home care services  No    Current waiver services  No    Have you fallen in the last 12 months  Yes    How many times  2      Assessment/Plan:     Diagnoses and all orders for this visit:    Diarrhea, unspecified type  -     Basic metabolic panel; Future  -     Magnesium; Future  -     CBC and differential; Future    Mild intermittent asthma without complication  -     albuterol (PROVENTIL HFA,VENTOLIN HFA) 90 mcg/act inhaler; Inhale 2 puffs every 6 (six) hours as needed for wheezing or shortness of breath    Reactive depression  -     escitalopram (Lexapro) 20 mg tablet; Take 0 5 tablets (10 mg total) by mouth daily    Neuropathy  -     gabapentin (NEURONTIN) 300 mg capsule; Take 1 capsule (300 mg total) by mouth 2 (two) times a day    Primary insomnia  -     zolpidem (AMBIEN) 10 mg tablet; Take 0 5 tablets (5 mg total) by mouth daily at bedtime as needed for sleep        Subjective:      Patient ID: Alma Delia Falcon is a 76 y o  female  HPI    Chief complaint: 76 y  o female presenting for hospital follow up for severe sepsis     HMI: Patient was admitted on 06/18/2022 to [facility] for one week of intermittent worsening diarrhea and abdominal pain  Patient discharged on 06/21/2022 with the diagnoses of sepsis, diarrhea, acute on chronic kidney injury, pneumobilia, prolonged Q-T interval, hypomagnesemia, overactive bladder, hypothyroidism, anxiety, hypertension, dyspepsia, chronic pain disorder and type 2 diabetes mellitus  Patient had been on Questran prior to admission to manage the diarrhea  Patient called her daughter on day of admission to report that the diarrhea restarted after taking the medication and eating a bland diet plus was feeling weaker  A CT of abdomen performed which exhibited colonic diverticulosis without evidence of acute diverticulitis  She was given a 5 day course of ceftriaxone and flagyl  A pneumobilia found on patient's CT scan is an incidental finding status post cholecystectomy in the past   No signs of cholangitis  Patient is to follow-up with GI as an outpatient  Patient had hypomagnesemia on admission with prolonged QT interval noted on ECG, so she was given IV magnesium and discharged on magnesium oxide  Patient's diarrhea had improved and GI cleared her for discharge  All of her pre-admission medications were restarted with the addition of magnesium oxide and saccharomyces boulardii  All of the patient's medications reviewed with her and her eldest daughter  Patient given a script to have a CBC with diff, BMP and serum magnesium to be done 5 days after discharge  Patient has a follow-up appointment as an outpatient with Dr Lawrence Asencio on 08/02/2022  Patient reported to her daughter (a [facility] physician) that the diarrhea was improving to point of constipation so Questran was being taken three times a day so decreased to twice a day  Patient was on gabapentin 400 mg daily of gabapentin but reports that it was not helping her so dosage changed to 300 mg twice a day as needed for chronic back pain  Patient has started to use a Rolator walker with increased stability with ambulation  Patient reports that she is lacking motivation to get ADLs done and falls asleep a lot during the day  she states that she laird snot feel the Lexapro is helping her so dosage to be increased  Patient instructed to return for her annual Medicare wellness visit next month and for follow-up  Family History   Problem Relation Age of Onset   Song Bones Lung cancer Mother 55    Pulmonary embolism Father     No Known Problems Sister     No Known Problems Daughter     No Known Problems Daughter     Stroke Maternal Grandmother     Heart attack Maternal Grandfather     No Known Problems Paternal Grandmother     No Known Problems Paternal Grandfather     No Known Problems Maternal Aunt     Diabetes Family         Diabetes mellitus    Hypertension Family     Stroke Family         Stroke complications     Social History     Socioeconomic History    Marital status:       Spouse name: Not on file    Number of children: Not on file    Years of education: Not on file    Highest education level: Not on file   Occupational History    Occupation: Retired   Tobacco Use    Smoking status: Former Smoker     Types: Cigarettes     Quit date: 1970     Years since quittin 5    Smokeless tobacco: Never Used    Tobacco comment: Denied history of current ever day smoker, Former smoker and Never smoker all documented in [facility]   Vaping Use    Vaping Use: Never used   Substance and Sexual Activity    Alcohol use: Not Currently     Comment: Denied history of alcohol use    Drug use: No     Comment: Denied history of drug use    Sexual activity: Not Currently   Other Topics Concern    Not on file   Social History Narrative    Marital History - Currently  per [facility]     Social Determinants of Health     Financial Resource Strain: Not on file   Food Insecurity: No Food Insecurity    Worried About Running Out of Food in the Last Year: Never true    [address] in the Last Year: Never true   Transportation Needs: No Transportation Needs    Lack of Transportation (Medical): No    Lack of Transportation (Non-Medical): No   Physical Activity: Not on file   Stress: Not on file   Social Connections: Not on file   Intimate Partner Violence: Not on file   Housing Stability: Low Risk     Unable to Pay for Housing in the Last Year: No    Number of Places Lived in the Last Year: 1    Unstable Housing in the Last Year: No     E-Cigarette/Vaping    E-Cigarette Use Never User      E-Cigarette/Vaping Substances    Nicotine No     THC No     CBD No     Flavoring No     Other No     Unknown No      Past Medical History:   Diagnosis Date    Acid reflux     Anemia     hx of iron-deficient    Anxiety     Arthritis     Asthma     last needed inhaler last year 2020    Basal cell carcinoma     upper lip    Chronic narcotic dependence (HCC)     Chronic pain     Colon polyp     Cystocele     Diabetes mellitus ([facility] [address] )     stable    Disease of thyroid gland     hypothyroidism    Diverticulosis     Dysfunctional uterine bleeding     last assessed - [id number]    Fibromyalgia     Gastric ulcer     Gastroparesis     History of colonic polyps     last assessed - [id number]    History of gastroesophageal reflux (GERD)     Hypercholesterolemia     Hyperlipidemia     Hypertension     IBS (irritable bowel syndrome)     Post laminectomy syndrome     Seasonal allergies     Spinal stenosis      Past Surgical History:   Procedure Laterality Date    APPENDECTOMY      BACK SURGERY      BREAST CYST EXCISION Left     CHOLECYSTECTOMY      COLONOSCOPY      ESOPHAGOGASTRODUODENOSCOPY N/A 09/28/2016    Procedure: ESOPHAGOGASTRODUODENOSCOPY (EGD); Surgeon: Simi Noel MD;  [facility];   Service:     HERNIA REPAIR      HYSTERECTOMY      TTA-BSO age 25   [facility] 47 age 25   [place] ARTHRODESIS POSTERIOR/POSTEROLATERAL THORACIC N/A 06/04/2018    Procedure: Reopening of lumbar incision for T12-L5 posterior instrumented fixation and fusion and T12-L4 posterior decompression;  Surgeon: Alonzo العلي MD;  [facility];  Service: Neurosurgery    [place] COLONOSCOPY FLX DX W/COLLJ SPEC WHEN PFRMD N/A 03/02/2016    Procedure: EGD AND COLONOSCOPY;  Surgeon: Faye Alexis MD;  [facility]; Service: Gastroenterology    [place] DILATE ESOPHAGUS N/A 09/28/2016    Procedure: DILATATION ESOPHAGEAL;  Surgeon: Faye Alexis MD;  [facility]; Service: Gastroenterology    [place] ESOPHAGOGASTRODUODENOSCOPY TRANSORAL DIAGNOSTIC N/A 07/18/2016    Procedure: ESOPHAGOGASTRODUODENOSCOPY (EGD); Surgeon: Faye Alexis MD;  [facility];   Service: Gastroenterology    [place] IMPLANT SPINAL NEUROSTIM/ Left 06/04/2018    Procedure: removal of left buttock implantable pulse generator and placement of new  implantable pulse generator;  Surgeon: Alonzo العلي MD;  [facility];  Service: Neurosurgery     Allergies   Allergen Reactions    Penicillins Anaphylaxis, Hives and Other (See Comments)     Other reaction(s): Unknown Reaction    Sulfa Antibiotics Anaphylaxis and Other (See Comments)     Other reaction(s): Unknown Reaction    Aspartame - Food Allergy Other (See Comments) and Hypertension     Slurred speech, weakness, stroke sx    Iodinated Diagnostic Agents Hives     Pt has taken prep prior for contrast and has not had any break through reaction    Keflex [Cephalexin] Hives    Levaquin [Levofloxacin] Rash       Current Outpatient Medications:     albuterol (PROVENTIL HFA,VENTOLIN HFA) 90 mcg/act inhaler, Inhale 2 puffs every 6 (six) hours as needed for wheezing or shortness of breath, Disp: 6 7 g, Rfl: 5    aspirin 81 mg chewable tablet, Chew 1 tablet (81 mg total) daily, Disp: 30 tablet, Rfl: 0    atorvastatin (LIPITOR) 40 mg tablet, Take 1 tablet (40 mg total) by mouth daily, Disp: 90 tablet, Rfl: 1    baclofen 10 mg tablet, Take one tablet by mouth three times a day as needed for muscle spasms, Disp: 90 tablet, Rfl: 4    cholestyramine (QUESTRAN) 4 g packet, Take 1 packet (4 g total) by mouth 2 (two) times a day with meals, Disp: 10 packet, Rfl: 0    escitalopram (Lexapro) 20 mg tablet, Take 0 5 tablets (10 mg total) by mouth daily, Disp: 30 tablet, Rfl: 5    gabapentin (NEURONTIN) 300 mg capsule, Take 1 capsule (300 mg total) by mouth 2 (two) times a day, Disp: 60 capsule, Rfl: 5    glucose blood test strip, Bid testing, Disp: 100 each, Rfl: 6    latanoprost (XALATAN) 0 005 % ophthalmic solution, Administer 1 drop to both eyes daily at bedtime, Disp: 2 5 mL, Rfl: 3    levothyroxine 25 mcg tablet, Take 1 5 tablets (37 5 mcg total) by mouth daily, Disp: 45 tablet, Rfl: 0    lisinopril (ZESTRIL) 20 mg tablet, Take 1 tablet (20 mg total) by mouth daily, Disp: 30 tablet, Rfl: 0    magnesium Oxide (MAG-OX) 400 mg TABS, Take 1 tablet (400 mg total) by mouth 2 (two) times a day, Disp: 30 tablet, Rfl: 0    meclizine (ANTIVERT) 25 mg tablet, Take 1 tablet (25 mg total) by mouth 4 (four) times a day as needed for dizziness, Disp: 60 tablet, Rfl: 0    metFORMIN (GLUCOPHAGE) 1000 MG tablet, Take 1 tablet (1,000 mg total) by mouth 2 (two) times a day with meals, Disp: 180 tablet, Rfl: 3    metoclopramide (REGLAN) 10 mg tablet, Take 1 tablet (10 mg total) by mouth 4 (four) times a day, Disp: 120 tablet, Rfl: 3    metoprolol tartrate (LOPRESSOR) 25 mg tablet, Take 0 5 tablets (12 5 mg total) by mouth every 12 (twelve) hours, Disp: 45 tablet, Rfl: 3    Milnacipran HCl (Savella) 100 MG TABS, Take 1 tablet (100 mg total) by mouth daily, Disp: 30 tablet, Rfl: 3    nystatin (MYCOSTATIN) powder, Apply topically 2 (two) times a day, Disp: 15 g, Rfl: 0    ondansetron (ZOFRAN) 8 mg tablet, Take 1 tablet (8 mg total) by mouth every 8 (eight) hours as needed for nausea or vomiting, Disp: 20 tablet, Rfl: 3    oxyCODONE-acetaminophen (PERCOCET)  mg per tablet, Take 1 tablet by mouth every 6 (six) hours as needed for severe pain Max Daily Amount: 4 tablets, Disp: 100 tablet, Rfl: 0    pantoprazole (PROTONIX) 40 mg tablet, Take 1 tablet (40 mg total) by mouth daily, Disp: 90 tablet, Rfl: 1    saccharomyces boulardii (FLORASTOR) 250 mg capsule, Take 1 capsule (250 mg total) by mouth 2 (two) times a day, Disp: 90 capsule, Rfl: 0    trospium chloride (SANCTURA) 20 mg tablet, Take 1 tablet (20 mg total) by mouth 2 (two) times a day, Disp: 90 tablet, Rfl: 3    zolpidem (AMBIEN) 10 mg tablet, Take 0 5 tablets (5 mg total) by mouth daily at bedtime as needed for sleep, Disp: 30 tablet, Rfl: 0      Review of Systems   Constitutional: Negative  HENT: Negative  Respiratory: Negative  Cardiovascular: Negative  Gastrointestinal: Positive for constipation and diarrhea  Genitourinary: Negative  Musculoskeletal: Positive for back pain, gait problem ( using a Rollator) and myalgias  Skin: Negative  Psychiatric/Behavioral:        Lacking motivation and sleeping a lot       Objective:    /58   Pulse 97   Temp 97 6 °F (36 4 °C)   Ht 5' (1 524 m)   Wt 83 9 kg (185 lb)   LMP  (LMP Unknown)   SpO2 95%   BMI 36 13 kg/m² (Reviewed)     Physical Exam  Vitals reviewed  Constitutional:       General: She is not in acute distress  Appearance: She is well-developed and well-groomed  She is not ill-appearing  HENT:      Head: Normocephalic and atraumatic  Right Ear: External ear normal       Left Ear: External ear normal    Eyes:      General: Lids are normal       Extraocular Movements: Extraocular movements intact  Conjunctiva/sclera: Conjunctivae normal       Pupils: Pupils are equal, round, and reactive to light  Neck:      Trachea: Trachea and phonation normal    Cardiovascular:      Rate and Rhythm: Normal rate and regular rhythm        Pulses: Normal pulses  Heart sounds: Normal heart sounds  Pulmonary:      Effort: Pulmonary effort is normal       Breath sounds: Normal breath sounds  Abdominal:      General: Abdomen is flat  Bowel sounds are normal  There is no distension  Palpations: Abdomen is soft  There is no mass  Tenderness: There is no abdominal tenderness  Musculoskeletal:      Cervical back: Neck supple  Skin:     General: Skin is warm and dry  Capillary Refill: Capillary refill takes less than 2 seconds  Neurological:      General: No focal deficit present  Mental Status: She is alert and oriented to person, place, and time  Psychiatric:         Mood and Affect: Mood normal          Behavior: Behavior normal  Behavior is cooperative  Thought Content:  Thought content normal

## 2022-07-05 ENCOUNTER — TELEPHONE (OUTPATIENT)
Dept: FAMILY MEDICINE CLINIC | Facility: CLINIC | Age: 77
End: 2022-07-05

## 2022-07-13 ENCOUNTER — OFFICE VISIT (OUTPATIENT)
Dept: FAMILY MEDICINE CLINIC | Facility: CLINIC | Age: 77
End: 2022-07-13
Payer: COMMERCIAL

## 2022-07-13 VITALS
DIASTOLIC BLOOD PRESSURE: 76 MMHG | TEMPERATURE: 98.5 F | HEIGHT: 60 IN | SYSTOLIC BLOOD PRESSURE: 120 MMHG | WEIGHT: 175 LBS | BODY MASS INDEX: 34.36 KG/M2 | HEART RATE: 82 BPM

## 2022-07-13 DIAGNOSIS — R11.2 NAUSEA AND VOMITING, UNSPECIFIED VOMITING TYPE: Primary | ICD-10-CM

## 2022-07-13 DIAGNOSIS — R51.9 ACUTE NONINTRACTABLE HEADACHE, UNSPECIFIED HEADACHE TYPE: ICD-10-CM

## 2022-07-13 PROCEDURE — 99214 OFFICE O/P EST MOD 30 MIN: CPT | Performed by: NURSE PRACTITIONER

## 2022-07-13 PROCEDURE — 96372 THER/PROPH/DIAG INJ SC/IM: CPT | Performed by: NURSE PRACTITIONER

## 2022-07-13 PROCEDURE — 1160F RVW MEDS BY RX/DR IN RCRD: CPT | Performed by: NURSE PRACTITIONER

## 2022-07-13 RX ORDER — KETOROLAC TROMETHAMINE 30 MG/ML
30 INJECTION, SOLUTION INTRAMUSCULAR; INTRAVENOUS ONCE
Status: DISCONTINUED | OUTPATIENT
Start: 2022-07-13 | End: 2022-07-13

## 2022-07-13 RX ORDER — PROMETHAZINE HYDROCHLORIDE 25 MG/1
25 SUPPOSITORY RECTAL EVERY 6 HOURS PRN
Qty: 12 EACH | Refills: 2 | Status: SHIPPED | OUTPATIENT
Start: 2022-07-13 | End: 2022-07-18

## 2022-07-13 RX ORDER — ONDANSETRON 2 MG/ML
4 INJECTION INTRAMUSCULAR; INTRAVENOUS ONCE
Status: DISCONTINUED | OUTPATIENT
Start: 2022-07-13 | End: 2022-07-18

## 2022-07-13 RX ADMIN — KETOROLAC TROMETHAMINE 30 MG: 30 INJECTION, SOLUTION INTRAMUSCULAR; INTRAVENOUS at 14:37

## 2022-07-14 ENCOUNTER — APPOINTMENT (INPATIENT)
Dept: CT IMAGING | Facility: HOSPITAL | Age: 77
DRG: 384 | End: 2022-07-14
Payer: COMMERCIAL

## 2022-07-14 ENCOUNTER — APPOINTMENT (INPATIENT)
Dept: RADIOLOGY | Facility: HOSPITAL | Age: 77
DRG: 384 | End: 2022-07-14
Payer: COMMERCIAL

## 2022-07-14 ENCOUNTER — HOSPITAL ENCOUNTER (INPATIENT)
Facility: HOSPITAL | Age: 77
LOS: 4 days | Discharge: HOME | DRG: 384 | End: 2022-07-18
Attending: INTERNAL MEDICINE | Admitting: INTERNAL MEDICINE
Payer: COMMERCIAL

## 2022-07-14 DIAGNOSIS — R11.2 NAUSEA AND VOMITING, UNSPECIFIED VOMITING TYPE: ICD-10-CM

## 2022-07-14 DIAGNOSIS — K31.84 GASTROPARESIS: ICD-10-CM

## 2022-07-14 DIAGNOSIS — K21.9 GASTROESOPHAGEAL REFLUX DISEASE WITHOUT ESOPHAGITIS: ICD-10-CM

## 2022-07-14 DIAGNOSIS — K25.9 PREPYLORIC ULCER, UNSPECIFIED ULCER CHRONICITY: ICD-10-CM

## 2022-07-14 DIAGNOSIS — R11.2 INTRACTABLE NAUSEA AND VOMITING: Primary | ICD-10-CM

## 2022-07-14 DIAGNOSIS — E86.0 DEHYDRATION: Primary | ICD-10-CM

## 2022-07-14 DIAGNOSIS — K27.9 PUD (PEPTIC ULCER DISEASE): ICD-10-CM

## 2022-07-14 LAB
4HR DELTA HS TROPONIN: 0 NG/L
ALBUMIN SERPL BCP-MCNC: 3.7 G/DL (ref 3.5–5)
ALP SERPL-CCNC: 98 U/L (ref 34–104)
ALT SERPL W P-5'-P-CCNC: 11 U/L (ref 7–52)
ANION GAP SERPL CALCULATED.3IONS-SCNC: 6 MMOL/L (ref 4–13)
AST SERPL W P-5'-P-CCNC: 17 U/L (ref 13–39)
BASOPHILS # BLD AUTO: 0.05 THOUSANDS/ΜL (ref 0–0.1)
BASOPHILS NFR BLD AUTO: 1 % (ref 0–1)
BILIRUB SERPL-MCNC: 0.47 MG/DL (ref 0.2–1)
BUN SERPL-MCNC: 22 MG/DL (ref 5–25)
CALCIUM SERPL-MCNC: 9.1 MG/DL (ref 8.4–10.2)
CARDIAC TROPONIN I PNL SERPL HS: 23 NG/L
CARDIAC TROPONIN I PNL SERPL HS: 23 NG/L
CHLORIDE SERPL-SCNC: 105 MMOL/L (ref 96–108)
CO2 SERPL-SCNC: 30 MMOL/L (ref 21–32)
CREAT SERPL-MCNC: 1.07 MG/DL (ref 0.6–1.3)
EOSINOPHIL # BLD AUTO: 0.07 THOUSAND/ΜL (ref 0–0.61)
EOSINOPHIL NFR BLD AUTO: 1 % (ref 0–6)
ERYTHROCYTE [DISTWIDTH] IN BLOOD BY AUTOMATED COUNT: 16.1 % (ref 11.6–15.1)
GFR SERPL CREATININE-BSD FRML MDRD: 49 ML/MIN/1.73SQ M
GLUCOSE SERPL-MCNC: 122 MG/DL (ref 65–140)
GLUCOSE SERPL-MCNC: 129 MG/DL (ref 65–140)
GLUCOSE SERPL-MCNC: 131 MG/DL (ref 65–140)
GLUCOSE SERPL-MCNC: 140 MG/DL (ref 65–140)
HCT VFR BLD AUTO: 37.6 % (ref 34.8–46.1)
HGB BLD-MCNC: 11.7 G/DL (ref 11.5–15.4)
IMM GRANULOCYTES # BLD AUTO: 0.04 THOUSAND/UL (ref 0–0.2)
IMM GRANULOCYTES NFR BLD AUTO: 1 % (ref 0–2)
LACTATE SERPL-SCNC: 0.9 MMOL/L (ref 0.5–2)
LIPASE SERPL-CCNC: 65 U/L (ref 11–82)
LYMPHOCYTES # BLD AUTO: 1.46 THOUSANDS/ΜL (ref 0.6–4.47)
LYMPHOCYTES NFR BLD AUTO: 18 % (ref 14–44)
MCH RBC QN AUTO: 27.2 PG (ref 26.8–34.3)
MCHC RBC AUTO-ENTMCNC: 31.1 G/DL (ref 31.4–37.4)
MCV RBC AUTO: 87 FL (ref 82–98)
MONOCYTES # BLD AUTO: 0.68 THOUSAND/ΜL (ref 0.17–1.22)
MONOCYTES NFR BLD AUTO: 8 % (ref 4–12)
NEUTROPHILS # BLD AUTO: 5.76 THOUSANDS/ΜL (ref 1.85–7.62)
NEUTS SEG NFR BLD AUTO: 71 % (ref 43–75)
NRBC BLD AUTO-RTO: 0 /100 WBCS
PLATELET # BLD AUTO: 329 THOUSANDS/UL (ref 149–390)
PMV BLD AUTO: 10.2 FL (ref 8.9–12.7)
POTASSIUM SERPL-SCNC: 4.4 MMOL/L (ref 3.5–5.3)
PROT SERPL-MCNC: 6.8 G/DL (ref 6.4–8.4)
RBC # BLD AUTO: 4.3 MILLION/UL (ref 3.81–5.12)
SODIUM SERPL-SCNC: 141 MMOL/L (ref 135–147)
TSH SERPL DL<=0.05 MIU/L-ACNC: 0.85 UIU/ML (ref 0.45–4.5)
WBC # BLD AUTO: 8.06 THOUSAND/UL (ref 4.31–10.16)

## 2022-07-14 PROCEDURE — 83605 ASSAY OF LACTIC ACID: CPT | Performed by: INTERNAL MEDICINE

## 2022-07-14 PROCEDURE — 85025 COMPLETE CBC W/AUTO DIFF WBC: CPT | Performed by: INTERNAL MEDICINE

## 2022-07-14 PROCEDURE — 93005 ELECTROCARDIOGRAM TRACING: CPT

## 2022-07-14 PROCEDURE — 99222 1ST HOSP IP/OBS MODERATE 55: CPT | Performed by: INTERNAL MEDICINE

## 2022-07-14 PROCEDURE — 82948 REAGENT STRIP/BLOOD GLUCOSE: CPT

## 2022-07-14 PROCEDURE — 74176 CT ABD & PELVIS W/O CONTRAST: CPT

## 2022-07-14 PROCEDURE — 80053 COMPREHEN METABOLIC PANEL: CPT | Performed by: INTERNAL MEDICINE

## 2022-07-14 PROCEDURE — 84443 ASSAY THYROID STIM HORMONE: CPT | Performed by: INTERNAL MEDICINE

## 2022-07-14 PROCEDURE — 71045 X-RAY EXAM CHEST 1 VIEW: CPT

## 2022-07-14 PROCEDURE — 83690 ASSAY OF LIPASE: CPT | Performed by: PHYSICIAN ASSISTANT

## 2022-07-14 PROCEDURE — C9113 INJ PANTOPRAZOLE SODIUM, VIA: HCPCS | Performed by: INTERNAL MEDICINE

## 2022-07-14 PROCEDURE — 84484 ASSAY OF TROPONIN QUANT: CPT | Performed by: INTERNAL MEDICINE

## 2022-07-14 PROCEDURE — G1004 CDSM NDSC: HCPCS

## 2022-07-14 PROCEDURE — 99223 1ST HOSP IP/OBS HIGH 75: CPT | Performed by: INTERNAL MEDICINE

## 2022-07-14 RX ORDER — LISINOPRIL 20 MG/1
20 TABLET ORAL DAILY
Status: DISCONTINUED | OUTPATIENT
Start: 2022-07-15 | End: 2022-07-18 | Stop reason: HOSPADM

## 2022-07-14 RX ORDER — MAGNESIUM HYDROXIDE/ALUMINUM HYDROXICE/SIMETHICONE 120; 1200; 1200 MG/30ML; MG/30ML; MG/30ML
30 SUSPENSION ORAL EVERY 6 HOURS PRN
Status: DISCONTINUED | OUTPATIENT
Start: 2022-07-14 | End: 2022-07-18 | Stop reason: HOSPADM

## 2022-07-14 RX ORDER — LATANOPROST 50 UG/ML
1 SOLUTION/ DROPS OPHTHALMIC
Status: DISCONTINUED | OUTPATIENT
Start: 2022-07-14 | End: 2022-07-18 | Stop reason: HOSPADM

## 2022-07-14 RX ORDER — ALBUTEROL SULFATE 90 UG/1
2 AEROSOL, METERED RESPIRATORY (INHALATION) EVERY 6 HOURS PRN
Status: DISCONTINUED | OUTPATIENT
Start: 2022-07-14 | End: 2022-07-18 | Stop reason: HOSPADM

## 2022-07-14 RX ORDER — ESCITALOPRAM OXALATE 10 MG/1
10 TABLET ORAL DAILY
Status: DISCONTINUED | OUTPATIENT
Start: 2022-07-15 | End: 2022-07-18 | Stop reason: HOSPADM

## 2022-07-14 RX ORDER — BACLOFEN 10 MG/1
5 TABLET ORAL 2 TIMES DAILY PRN
Status: DISCONTINUED | OUTPATIENT
Start: 2022-07-14 | End: 2022-07-16

## 2022-07-14 RX ORDER — HYDROMORPHONE HCL IN WATER/PF 6 MG/30 ML
0.2 PATIENT CONTROLLED ANALGESIA SYRINGE INTRAVENOUS EVERY 6 HOURS PRN
Status: DISCONTINUED | OUTPATIENT
Start: 2022-07-14 | End: 2022-07-16

## 2022-07-14 RX ORDER — INSULIN LISPRO 100 [IU]/ML
1-6 INJECTION, SOLUTION INTRAVENOUS; SUBCUTANEOUS
Status: DISCONTINUED | OUTPATIENT
Start: 2022-07-14 | End: 2022-07-18 | Stop reason: HOSPADM

## 2022-07-14 RX ORDER — ENOXAPARIN SODIUM 100 MG/ML
40 INJECTION SUBCUTANEOUS DAILY
Status: DISCONTINUED | OUTPATIENT
Start: 2022-07-14 | End: 2022-07-18 | Stop reason: HOSPADM

## 2022-07-14 RX ORDER — SODIUM CHLORIDE, SODIUM LACTATE, POTASSIUM CHLORIDE, CALCIUM CHLORIDE 600; 310; 30; 20 MG/100ML; MG/100ML; MG/100ML; MG/100ML
75 INJECTION, SOLUTION INTRAVENOUS CONTINUOUS
Status: DISCONTINUED | OUTPATIENT
Start: 2022-07-14 | End: 2022-07-17

## 2022-07-14 RX ORDER — MECLIZINE HYDROCHLORIDE 25 MG/1
25 TABLET ORAL 4 TIMES DAILY PRN
Status: DISCONTINUED | OUTPATIENT
Start: 2022-07-14 | End: 2022-07-16

## 2022-07-14 RX ORDER — ATORVASTATIN CALCIUM 40 MG/1
40 TABLET, FILM COATED ORAL
Status: DISCONTINUED | OUTPATIENT
Start: 2022-07-14 | End: 2022-07-18 | Stop reason: HOSPADM

## 2022-07-14 RX ORDER — PANTOPRAZOLE SODIUM 40 MG/10ML
40 INJECTION, POWDER, LYOPHILIZED, FOR SOLUTION INTRAVENOUS EVERY 12 HOURS SCHEDULED
Status: DISCONTINUED | OUTPATIENT
Start: 2022-07-14 | End: 2022-07-18 | Stop reason: HOSPADM

## 2022-07-14 RX ORDER — LEVOTHYROXINE SODIUM 0.07 MG/1
37.5 TABLET ORAL
Status: DISCONTINUED | OUTPATIENT
Start: 2022-07-15 | End: 2022-07-18 | Stop reason: HOSPADM

## 2022-07-14 RX ORDER — GABAPENTIN 300 MG/1
300 CAPSULE ORAL 2 TIMES DAILY
Status: DISCONTINUED | OUTPATIENT
Start: 2022-07-14 | End: 2022-07-18 | Stop reason: HOSPADM

## 2022-07-14 RX ORDER — ONDANSETRON 2 MG/ML
4 INJECTION INTRAMUSCULAR; INTRAVENOUS EVERY 6 HOURS PRN
Status: DISCONTINUED | OUTPATIENT
Start: 2022-07-14 | End: 2022-07-14

## 2022-07-14 RX ORDER — ZOLPIDEM TARTRATE 5 MG/1
5 TABLET ORAL
Status: DISCONTINUED | OUTPATIENT
Start: 2022-07-14 | End: 2022-07-18 | Stop reason: HOSPADM

## 2022-07-14 RX ORDER — OXYCODONE HYDROCHLORIDE 5 MG/1
5 TABLET ORAL EVERY 6 HOURS PRN
Status: DISCONTINUED | OUTPATIENT
Start: 2022-07-14 | End: 2022-07-16

## 2022-07-14 RX ORDER — ACETAMINOPHEN 325 MG/1
650 TABLET ORAL EVERY 6 HOURS PRN
Status: DISCONTINUED | OUTPATIENT
Start: 2022-07-14 | End: 2022-07-18 | Stop reason: HOSPADM

## 2022-07-14 RX ADMIN — PANTOPRAZOLE SODIUM 40 MG: 40 INJECTION, POWDER, FOR SOLUTION INTRAVENOUS at 14:09

## 2022-07-14 RX ADMIN — GABAPENTIN 300 MG: 300 CAPSULE ORAL at 18:05

## 2022-07-14 RX ADMIN — Medication 12.5 MG: at 20:26

## 2022-07-14 RX ADMIN — TRIMETHOBENZAMIDE HYDROCHLORIDE 200 MG: 100 INJECTION INTRAMUSCULAR at 16:00

## 2022-07-14 RX ADMIN — SODIUM CHLORIDE, POTASSIUM CHLORIDE, SODIUM LACTATE AND CALCIUM CHLORIDE 125 ML/HR: 600; 310; 30; 20 INJECTION, SOLUTION INTRAVENOUS at 22:24

## 2022-07-14 RX ADMIN — HYDROMORPHONE HYDROCHLORIDE 0.2 MG: 0.2 INJECTION, SOLUTION INTRAMUSCULAR; INTRAVENOUS; SUBCUTANEOUS at 14:04

## 2022-07-14 RX ADMIN — SODIUM CHLORIDE, POTASSIUM CHLORIDE, SODIUM LACTATE AND CALCIUM CHLORIDE 125 ML/HR: 600; 310; 30; 20 INJECTION, SOLUTION INTRAVENOUS at 14:10

## 2022-07-14 RX ADMIN — ZOLPIDEM TARTRATE 5 MG: 5 TABLET ORAL at 20:26

## 2022-07-14 RX ADMIN — PANTOPRAZOLE SODIUM 40 MG: 40 INJECTION, POWDER, FOR SOLUTION INTRAVENOUS at 20:33

## 2022-07-14 RX ADMIN — ATORVASTATIN CALCIUM 40 MG: 40 TABLET, FILM COATED ORAL at 18:05

## 2022-07-14 RX ADMIN — LATANOPROST 1 DROP: 50 SOLUTION OPHTHALMIC at 22:16

## 2022-07-14 RX ADMIN — ACETAMINOPHEN 650 MG: 325 TABLET ORAL at 20:26

## 2022-07-14 NOTE — CASE MANAGEMENT
Case Management Assessment & Discharge Planning Note    Patient name Kaci Parra  Location W /W -01 MRN [id number]  : 1945 Date 2022       Current Admission Date: 2022  Current Admission Diagnosis:Intractable nausea and vomiting   Patient Active Problem List    Diagnosis Date Noted    Intractable nausea and vomiting 2022    Hypomagnesemia 2022    Anemia 2022    Sepsis ([facility] [address] ) 2022    Prolonged Q-T interval on ECG 2022    Pneumobilia 2022    Stroke-like symptoms 2022    Hypoalbuminemia due to protein-calorie malnutrition ([address] ) 2021    Obesity, morbid ([address] ) 2021    Right lower quadrant abdominal pain 2021    PUD (peptic ulcer disease) 2021    Diarrhea 2021    Elevated troponin 2020    Right knee pain 2020    Melena 07/15/2020    Mild intermittent asthma without complication 66/15/9922    S/P insertion of spinal cord stimulator 2018    Iron deficiency anemia secondary to inadequate dietary iron intake 2018    Failed back surgical syndrome 2018    Status post lumbar spinal fusion 2018    Pain in right wrist 2018    Hypothyroidism 2017    Degenerative lumbar spinal stenosis 2017    Glaucoma 2017    Overactive bladder 2016    Dyspepsia 2016    High cholesterol 2016    Type 2 diabetes mellitus ([facility] [address] ) 2015    Anxiety 2015    Chronic pain disorder 2013    Hypertension 2013    Postlaminectomy syndrome, lumbar 2012      LOS (days): 0  Geometric Mean LOS (GMLOS) (days): 2 60  Days to GMLOS:2 5     OBJECTIVE:  PATIENT READMITTED TO HOSPITAL            Current admission status: Inpatient       Preferred Pharmacy:   [address] [address]  Phone: [phone] Fax: [phone]    [place] [address] [place] [address] [facility] [address]  Phone: [phone] Fax: [phone]    [facility] [address] [place], [place] - [address]  [address]  [address] [facility] [address]  Phone: [phone] Fax: [phone] [address] Lawanda Saldaña [address] [place] [address]  [address] [facility] [place] [facility]\"" [address]  Phone: [phone] Fax: [phone]    Primary Care Provider: NANY Hand    Primary Insurance: [facility]  Secondary Insurance:     ASSESSMENT:  Active Health Care Proxies    There are no active Health Care Proxies on file  Readmission Root Cause  30 Day Readmission: Yes  Who directed you to return to the hospital?: PCP  Did you understand whom to contact if you had questions or problems?: Yes  Did you get your prescriptions before you left the hospital?: No  Reason[de-identified] Not preferred pharmacy  Were you able to get your prescriptions filled when you left the hospital?: Yes  Did you take your medications as prescribed?: Yes  Were you able to get to your follow-up appointments?: Yes  During previous admission, was a post-acute recommendation made?: Yes  What post-acute resources were offered?: STR  Patient was readmitted due to: intractable nausea/vomiting, dehydration  Action Plan: PT/OT requested, may benefit from home care services/rehab if agreeable    Patient Information  Admitted from[de-identified] Home  Mental Status: Alert  During Assessment patient was accompanied by: Not accompanied during assessment  Assessment information provided by[de-identified] Patient  Primary Caregiver: Self  Support Systems: Self, [address] of Residence: [address] [facility] do you live in?: [facility] entry access options   Select all that apply : No steps to enter home  Type of Current Residence: Apartment  Floor Level: 1  Upon entering residence, is there a bedroom on the main floor (no further steps)?: Yes  Upon entering residence, is there a bathroom on the main floor (no further steps)?: Yes  In the last 12 months, was there a time when you were not able to pay the mortgage or rent on time?: No  In the last 12 months, how many places have you lived?: 1  In the last 12 months, was there a time when you did not have a steady place to sleep or slept in a shelter (including now)?: No  Homeless/housing insecurity resource given?: N/A  Living Arrangements: Lives w/ Son  Is patient a ?: No    Activities of Daily Living Prior to Admission  Functional Status: Independent  Completes ADLs independently?: Yes  Ambulates independently?: Yes  Does patient use assisted devices?: Yes  Assisted Devices (DME) used: Nissa Cantrell, Nebulizer  Does patient currently own DME?: Yes  What DME does the patient currently own?: Nissa Huitron  Does patient have a history of Outpatient Therapy (PT/OT)?: Yes ([facility])  Does the patient have a history of Short-Term Rehab?: No  Does patient have a history of HHC?: Yes (believes it was [facility])  Does patient currently have [address]?: No         Patient Information Continued  Income Source: SSI/SSD  Does patient have prescription coverage?: Yes (preference for [address] [place] [address] on [facility])  Within the past 12 months, you worried that your food would run out before you got the money to buy more : Never true  Within the past 12 months, the food you bought just didn't last and you didn't have money to get more : Never true  Food insecurity resource given?: N/A  Does patient receive dialysis treatments?: No  Does patient have a history of substance abuse?: No  Does patient have a history of Mental Health Diagnosis?: No         Means of Transportation  Means of Transport to Appts[de-identified] Family transport  In the past 12 months, has lack of transportation kept you from medical appointments or from getting medications?: No  In the past 12 months, has lack of transportation kept you from meetings, work, or from getting things needed for daily living?: No  Was application for public transport provided?: N/A        DISCHARGE DETAILS:    Discharge planning discussed with[de-identified] patient at bedside  [place] of Choice: Yes (re: home care)  Comments - Freedom of Choice: reports she's not sure if she will need it; [facility]     Were Treatment Team discharge recommendations reviewed with patient/caregiver?: Yes  Did patient/caregiver verbalize understanding of patient care needs?: Yes  Were patient/caregiver advised of the risks associated with not following Treatment Team discharge recommendations?: Yes                   Other Referral/Resources/Interventions Provided:  Interventions: [facility]  Referral Comments: Patient admitted due to intractable nausea/vomiting, dehydration  Met with patient at bedside to complete assessment  Patient reports that she lives in a 1st floor apartment with her sonSia  Has no steps to enter  States that she usually ambulates with a walker; also has a cane if needed  Had home care services, she believes, from [address], following last hospitalization for physical therapy, but denies any current services at this time  Has history of out-patient therapy at [address] [place] [address]; no history of in-patient rehab  Patient reports that she's independent with ADLs  States that family provides transportation  Usually uses [address] for medications, and confirms she has an Rx plan  Patient reports that family will be transporting her home at d/c  PT/OT requested; will continue to follow      Would you like to participate in our [address] service program?  : No - Declined          Transport at Discharge : Family                             IMM Given (Date):: 07/14/22

## 2022-07-14 NOTE — ASSESSMENT & PLAN NOTE
Patient is a direct admit from PCP's office  Patient also has history of gastroparesis, on Reglan  EGD in July 2021 showed 2 small clean based pre-pyloric ulcers  Biopsies negative for H pylori  Positive for intestinal metaplasia  Will keep patient clear liquids, give Zofran, ppi, IV fluid, will consider restarting Reglan if CT scan negative for obstruction  Consult Gastroenterology  Obtain basic labs CBC, CMP, troponin, lactate  Will order CT abdomen and pelvis  Obtain EKG

## 2022-07-14 NOTE — ASSESSMENT & PLAN NOTE
Lab Results   Component Value Date    HGBA1C 8 6 (H) 04/22/2022       No results for input(s): POCGLU in the last 72 hours  Blood Sugar Average: Last 72 hrs:   currently takes metformin at home, last A1c was 8 6  Will hold metformin and give sliding scale insulin Accu-Cheks

## 2022-07-14 NOTE — H&P
[address] 1945, 76 y o  female MRN: [id number]  Unit/Bed#: W -01 Encounter: [id number]  Primary Care Provider: Rosmery Montesinos   Date and time admitted to hospital: 7/14/2022 12:24 PM    * Intractable nausea and vomiting  Assessment & Plan  Patient is a direct admit from PCP's office  Patient also has history of gastroparesis, on Reglan  EGD in July 2021 showed 2 small clean based pre-pyloric ulcers  Biopsies negative for H pylori  Positive for intestinal metaplasia  Will keep patient clear liquids, give Zofran, ppi, IV fluid, will consider restarting Reglan if CT scan negative for obstruction  Consult Gastroenterology  Obtain basic labs CBC, CMP, troponin, lactate  Will order CT abdomen and pelvis  Obtain EKG  PUD (peptic ulcer disease)  Assessment & Plan  Give PPI  Hypertension  Assessment & Plan  Continue on metoprolol and lisinopril  Chronic pain disorder  Assessment & Plan  Patient takes Percocet at home as needed once or twice a day  At this time will continue as needed until further evaluation is done  Patient complains of headache 9/10, abdominal pain 8/10, lower back pain 6/10  Requesting for some pain medication to relieve the pain  I have ordered Dilaudid for severe pain and oxycodone for moderate pain  Will reassess pain    Type 2 diabetes mellitus ([facility] [address] )  Assessment & Plan  Lab Results   Component Value Date    HGBA1C 8 6 (H) 04/22/2022       No results for input(s): POCGLU in the last 72 hours  Blood Sugar Average: Last 72 hrs:   currently takes metformin at home, last A1c was 8 6  Will hold metformin and give sliding scale insulin Accu-Cheks        VTE Prophylaxis: Enoxaparin (Lovenox)  / sequential compression device   Code Status:  Full code  POLST: There is no POLST form on file for this patient (pre-hospital)  Discussion with family:  Patient's daughter Velasquez Leblanc    Anticipated Length of Stay:  Patient will be admitted on an Inpatient basis with an anticipated length of stay of  more than 2 midnights  Justification for Hospital Stay:  Intractable nausea and vomiting    Total Time for Visit, including Counseling / Coordination of Care: 70 minutes  Greater than 50% of this total time spent on direct patient counseling and coordination of care  Chief Complaint:   Intractable nausea vomiting, abdominal pain, headache, back pain    History of Present Illness: Og Dominguez is a 76 y o  female who presents with intractable nausea and vomiting  Per patient symptoms started 2 days ago, multiple episodes, nausea, vomiting, no hematemesis  Unable to keep anything down  Complains of abdominal pain mostly lower abdomen, 8/10 in severity, no radiation, no aggravating, relieving factors  Complains of headache frontal, 6/10 in severity, lower back pain 9/10 in severity  Patient denies any fever, chills, chest pain, shortness of breath  Denies any urinary complaints  Denies any diarrhea or constipation  Per patient daughter patient had prior history of pyloric stenosis which required dilatation  This was not visible on the recent EGDs  Will consult Gastroenterology  Patient is a direct admit from PCPs office, lab workup and imaging studies ordered  Discussed with the patient's nurse  Discussed with patient's daughter  Review of Systems:    More than 10 system review of systems was performed, pertinent positive and negative findings mentioned as per history present illness        Past Medical and Surgical History:     Past Medical History:   Diagnosis Date    Acid reflux     Anemia     hx of iron-deficient    Anxiety     Arthritis     Asthma     last needed inhaler last year 2020    Basal cell carcinoma     upper lip    Chronic narcotic dependence (HCC)     Chronic pain     Colon polyp     Cystocele     Diabetes mellitus (HCC)     stable    Disease of thyroid gland     hypothyroidism    Diverticulosis     Dysfunctional uterine bleeding     last assessed - [id number]    Fibromyalgia     Gastric ulcer     Gastroparesis     History of colonic polyps     last assessed - [id number]    History of gastroesophageal reflux (GERD)     Hypercholesterolemia     Hyperlipidemia     Hypertension     IBS (irritable bowel syndrome)     Post laminectomy syndrome     Seasonal allergies     Spinal stenosis        Past Surgical History:   Procedure Laterality Date    APPENDECTOMY      BACK SURGERY      BREAST CYST EXCISION Left     CHOLECYSTECTOMY      COLONOSCOPY      ESOPHAGOGASTRODUODENOSCOPY N/A 09/28/2016    Procedure: ESOPHAGOGASTRODUODENOSCOPY (EGD); Surgeon: Ninoska De La Cruz MD;  [facility]; Service:     HERNIA REPAIR      HYSTERECTOMY      TTAH-BSO age 25   Ardeth Needs LAMINECTOMY      LUMBAR LAMINECTOMY      OOPHORECTOMY      age 25   Ardeth Needs MO ARTHRODESIS POSTERIOR/POSTEROLATERAL THORACIC N/A 06/04/2018    Procedure: Reopening of lumbar incision for T12-L5 posterior instrumented fixation and fusion and T12-L4 posterior decompression;  Surgeon: Es Harvey MD;  [facility];  Service: Neurosurgery    MO COLONOSCOPY FLX DX W/COLLJ SPEC WHEN PFRMD N/A 03/02/2016    Procedure: EGD AND COLONOSCOPY;  Surgeon: Ninoska De La Cruz MD;  [facility]; Service: Gastroenterology    MO DILATE ESOPHAGUS N/A 09/28/2016    Procedure: DILATATION ESOPHAGEAL;  Surgeon: Ninoska De La Cruz MD;  [facility]; Service: Gastroenterology    MO ESOPHAGOGASTRODUODENOSCOPY TRANSORAL DIAGNOSTIC N/A 07/18/2016    Procedure: ESOPHAGOGASTRODUODENOSCOPY (EGD); Surgeon: Ninoska De La Cruz MD;  [facility];   Service: Gastroenterology    MO IMPLANT SPINAL NEUROSTIM/ Left 06/04/2018    Procedure: removal of left buttock implantable pulse generator and placement of new  implantable pulse generator;  Surgeon: Es Harvey MD;  [facility];  Service: Neurosurgery Meds/Allergies:    Prior to Admission medications    Medication Sig Start Date End Date Taking?  Authorizing Provider   albuterol (PROVENTIL HFA,VENTOLIN HFA) 90 mcg/act inhaler Inhale 2 puffs every 6 (six) hours as needed for wheezing or shortness of breath 6/29/22   NANY Guadalupe   aspirin 81 mg chewable tablet Chew 1 tablet (81 mg total) daily 6/21/22 7/21/22  Brittney Vazquez MD   atorvastatin (LIPITOR) 40 mg tablet Take 1 tablet (40 mg total) by mouth daily 6/3/22   NANY Guadalupe   baclofen 10 mg tablet Take one tablet by mouth three times a day as needed for muscle spasms 6/8/22   NANY Guadalupe   cholestyramine (QUESTRAN) 4 g packet Take 1 packet (4 g total) by mouth 2 (two) times a day with meals 6/16/22   NANY Guadalupe   escitalopram (Lexapro) 20 mg tablet Take 0 5 tablets (10 mg total) by mouth daily 6/29/22   NANY Guadalupe   gabapentin (NEURONTIN) 300 mg capsule Take 1 capsule (300 mg total) by mouth 2 (two) times a day 6/29/22   NANY Guadalupe   glucose blood test strip Bid testing 2/27/19   Bo Baires DO   latanoprost (XALATAN) 0 005 % ophthalmic solution Administer 1 drop to both eyes daily at bedtime 10/2/20 6/29/22  Bo Baires DO   levothyroxine 25 mcg tablet Take 1 5 tablets (37 5 mcg total) by mouth daily 6/21/22 7/21/22  Brittney Vazquez MD   lisinopril (ZESTRIL) 20 mg tablet Take 1 tablet (20 mg total) by mouth daily 6/24/22 10/22/22  Brittney Vazquez MD   magnesium Oxide (MAG-OX) 400 mg TABS Take 1 tablet (400 mg total) by mouth 2 (two) times a day 6/21/22   Brittney Vazquez MD   meclizine (ANTIVERT) 25 mg tablet Take 1 tablet (25 mg total) by mouth 4 (four) times a day as needed for dizziness 6/21/22 7/31/22  Brittney Vazquez MD   metFORMIN (GLUCOPHAGE) 1000 MG tablet Take 1 tablet (1,000 mg total) by mouth 2 (two) times a day with meals 3/9/22   NANY Guadalupe   metoclopramide (REGLAN) 10 mg tablet Take 1 tablet (10 mg total) by mouth 4 (four) times a day 6/24/21   NANY Stover   metoprolol tartrate (LOPRESSOR) 25 mg tablet Take 0 5 tablets (12 5 mg total) by mouth every 12 (twelve) hours 5/23/22   NANY Stover   Milnacipran HCl (Savella) 100 MG TABS Take 1 tablet (100 mg total) by mouth daily 9/13/21   Marc Baires DO   nystatin (MYCOSTATIN) powder Apply topically 2 (two) times a day 6/21/22   Polina Ga MD   ondansetron (ZOFRAN) 4 mg/2 mL injection Inject 2 mL (4 mg total) into a muscle 1 (one) time for 1 dose 7/13/22 7/13/22  NANY Stover   ondansetron [facility]) 8 mg tablet Take 1 tablet (8 mg total) by mouth every 8 (eight) hours as needed for nausea or vomiting 3/9/22   NANY Stover   oxyCODONE-acetaminophen (PERCOCET)  mg per tablet Take 1 tablet by mouth every 6 (six) hours as needed for severe pain Max Daily Amount: 4 tablets 6/22/22   NANY Stover   pantoprazole (PROTONIX) 40 mg tablet Take 1 tablet (40 mg total) by mouth daily 7/22/21 6/29/22  Jon Feliciano MD   promethazine (PHENERGAN) 25 mg suppository Insert 1 suppository (25 mg total) into the rectum every 6 (six) hours as needed for nausea or vomiting 7/13/22   Marc Baires DO   saccharomyces boulardii (FLORASTOR) 250 mg capsule Take 1 capsule (250 mg total) by mouth 2 (two) times a day 6/21/22   Polina Ga MD   trospium chloride (SANCTURA) 20 mg tablet Take 1 tablet (20 mg total) by mouth 2 (two) times a day 3/28/22   Ashleigh Kaur PA-C   zolpidem (AMBIEN) 10 mg tablet Take 0 5 tablets (5 mg total) by mouth daily at bedtime as needed for sleep 6/29/22   NANY Stvoer     I have reviewed home medications using allscripts  Allergies:    Allergies   Allergen Reactions    Penicillins Anaphylaxis, Hives and Other (See Comments)     Other reaction(s): Unknown Reaction    Sulfa Antibiotics Anaphylaxis and Other (See Comments)     Other reaction(s): Unknown Reaction  Aspartame - Food Allergy Other (See Comments) and Hypertension     Slurred speech, weakness, stroke sx    Iodinated Diagnostic Agents Hives     Pt has taken prep prior for contrast and has not had any break through reaction    Keflex [Cephalexin] Hives    Levaquin [Levofloxacin] Rash       Social History:     Marital Status:    Social History     Substance and Sexual Activity   Alcohol Use Not Currently    Comment: Denied history of alcohol use     Social History     Tobacco Use   Smoking Status Former Smoker    Types: Cigarettes    Quit date: 1970    Years since quittin 5   Smokeless Tobacco Never Used   Tobacco Comment    Denied history of current ever day smoker, Former smoker and Never smoker all documented in Allscripts     Social History     Substance and Sexual Activity   Drug Use No    Comment: Denied history of drug use       Family History:    non-contributory    Physical Exam:     Vitals:        Physical Exam    General appearance:  Patient not in acute distress  Eyes:  Pupils equal reacting to light  ENT:  Moist oral mucous membranes  CVS:  S1-S2 heard, regular rate and rhythm, no pedal edema  Chest:  Bilateral air entry present, clear to auscultation  Abdomen:  Soft, tender all over the abdomen, nondistended, bowel sounds present, no guarding, rigidity  CNS:  No focal neurological deficits  Genitourinary: deferred  Skin:  No acute rash   psychiatric:  No psychosis  Musculoskeletal:  No joint deformities    Additional Data:   CT abdomen and pelvis, chest x-ray ordered  Lab workup ordered  EKG, Pathology, and Other Studies Reviewed on Admission:   · EKG:  Pending    [facility] Records Reviewed: Yes     ** Please Note: This note has been constructed using a voice recognition system   **

## 2022-07-14 NOTE — CONSULTS
Consultation - [facility]  Aly Yun 76 y o  female MRN: [id number]  Unit/Bed#: W -01 Encounter: [id number]        Inpatient consult to gastroenterology  Consult performed by: Faviola Agrawal PA-C  Consult ordered by: Nikita Raman MD          Reason for Consult / Principal Problem:  Nausea, vomiting    ASSESSMENT and PLAN:      1  Nausea, vomiting with upper abdominal pain  A 78-year-old female here for direct admission with a history of gastroparesis, peptic ulcer disease who presented with nausea vomiting x2 days  CMP normal, CBC with normal white count, hemoglobin and platelets  EGD last year with gastric ulcers and repeat recommended in 3 months  H pylori negative  CT scan was ordered but not performed yet  -ordered lipase   -follow-up imaging     -plan for EGD tomorrow as she is overdue for history of gastric ulcers  -NPO at midnight   -hold morning Lovenox  -clear liquid diet  -PPI b i d   -will consider addition of Carafate tomorrow if needed  -Supportive care with antiemetics as needed  Patient is on Reglan at home   -avoid NSAIDs    -outpatient follow-up for management of gastroparesis     -------------------------------------------------------------------------------------------------------------------    HPI:     Aly Yun is a 78-year-old female with past medical history of type 2 diabetes, hypertension, history of peptic ulcer disease, gastroparesis who presented for direct admission today for nausea, vomiting  Lab work is still pending  Imaging is still pending  Patient was recently admitted 06/18/2020 for diarrhea  CT on exam showing subtle pneumobilia in the left lobe  She had right upper quadrant ultrasound nausea which was unremarkable  LFTs were normal at that time besides mild elevation of alk-phos  C diff testing at that time was negative  Patient reports she was feeling well after this admission until 2 days ago    Two days ago she started to have some nausea and vomiting of mucus  This is happen for the past 2 days  She has been unable to tolerate liquids or solids  She has not been able to keep food down  Denies any sick contacts  Reports some upper abdominal pain in the epigastric, right and left upper quadrants  She reports her bowel movements are normal, last 1 last night  She was taking Questran after discharge due to diarrhea which has since resolved and she stopped this due to constipation  Patient reports she was unaware of a history of gastroparesis however appears she takes Reglan 10 mg 4 times daily at home  Last A1c of 8 6  Patient had EGD 07/2021 showing clean based pre-pyloric ulcers and repeat recommended in 3 months  Biopsies negative for H pylori but did show intestinal metaplasia  REVIEW OF SYSTEMS:    CONSTITUTIONAL: + decreased appetite  No fevers or chills  HEENT: No earache or tinnitus  Denies hearing loss or visual disturbances  CARDIOVASCULAR: No chest pain or palpitations  RESPIRATORY: Denies any cough, hemoptysis, shortness of breath or dyspnea on exertion  GASTROINTESTINAL: As noted in the History of Present Illness  GENITOURINARY: No problems with urination  Denies any hematuria or dysuria  NEUROLOGIC: No dizziness or vertigo, denies headaches  MUSCULOSKELETAL: Denies any muscle or joint pain  SKIN: Denies skin rashes or itching  ENDOCRINE: Denies excessive thirst  Denies intolerance to heat or cold  PSYCHOSOCIAL: Denies depression or anxiety  Denies any recent memory loss         Historical Information   Past Medical History:   Diagnosis Date    Acid reflux     Anemia     hx of iron-deficient    Anxiety     Arthritis     Asthma     last needed inhaler last year 2020    Basal cell carcinoma     upper lip    Chronic narcotic dependence (HCC)     Chronic pain     Colon polyp     Cystocele     Diabetes mellitus (HCC)     stable    Disease of thyroid gland hypothyroidism    Diverticulosis     Dysfunctional uterine bleeding     last assessed - [id number]    Fibromyalgia     Gastric ulcer     Gastroparesis     History of colonic polyps     last assessed - [id number]    History of gastroesophageal reflux (GERD)     Hypercholesterolemia     Hyperlipidemia     Hypertension     IBS (irritable bowel syndrome)     Post laminectomy syndrome     Seasonal allergies     Spinal stenosis      Past Surgical History:   Procedure Laterality Date    APPENDECTOMY      BACK SURGERY      BREAST CYST EXCISION Left     CHOLECYSTECTOMY      COLONOSCOPY      ESOPHAGOGASTRODUODENOSCOPY N/A 09/28/2016    Procedure: ESOPHAGOGASTRODUODENOSCOPY (EGD); Surgeon: Surjit Zaman MD;  [facility]; Service:     HERNIA REPAIR      HYSTERECTOMY      TTAH-BSO age 25   Carleene Martinet LAMINECTOMY      LUMBAR LAMINECTOMY      OOPHORECTOMY      age 25   Carleene Martinet UT ARTHRODESIS POSTERIOR/POSTEROLATERAL THORACIC N/A 06/04/2018    Procedure: Reopening of lumbar incision for T12-L5 posterior instrumented fixation and fusion and T12-L4 posterior decompression;  Surgeon: Samira Vera MD;  [facility];  Service: Neurosurgery    UT COLONOSCOPY FLX DX W/COLLJ SPEC WHEN PFRMD N/A 03/02/2016    Procedure: EGD AND COLONOSCOPY;  Surgeon: Surjit Zaman MD;  [facility]; Service: Gastroenterology    UT DILATE ESOPHAGUS N/A 09/28/2016    Procedure: DILATATION ESOPHAGEAL;  Surgeon: Surjit Zaman MD;  [facility]; Service: Gastroenterology    UT ESOPHAGOGASTRODUODENOSCOPY TRANSORAL DIAGNOSTIC N/A 07/18/2016    Procedure: ESOPHAGOGASTRODUODENOSCOPY (EGD); Surgeon: Surjit Zaman MD;  [facility];   Service: Gastroenterology    UT IMPLANT SPINAL NEUROSTIM/ Left 06/04/2018    Procedure: removal of left buttock implantable pulse generator and placement of new  implantable pulse generator;  Surgeon: Samira Vera MD;  [facility];  Service: Neurosurgery     Social History   Social History     Substance and Sexual Activity   Alcohol Use Not Currently    Comment: Denied history of alcohol use     Social History     Substance and Sexual Activity   Drug Use No    Comment: Denied history of drug use     Social History     Tobacco Use   Smoking Status Former Smoker    Types: Cigarettes    Quit date: 1970    Years since quittin 5   Smokeless Tobacco Never Used   Tobacco Comment    Denied history of current ever day smoker, Former smoker and Never smoker all documented in [employer]     Family History   Problem Relation Age of Onset    Lung cancer Mother 55    Pulmonary embolism Father     No Known Problems Sister     No Known Problems Daughter     No Known Problems Daughter     Stroke Maternal Grandmother     Heart attack Maternal Grandfather     No Known Problems Paternal Grandmother     No Known Problems Paternal Grandfather     No Known Problems Maternal Aunt     Diabetes Family         Diabetes mellitus    Hypertension Family     Stroke Family         Stroke complications       Meds/Allergies     Facility-Administered Medications Prior to Admission   Medication    [COMPLETED] ketorolac (TORADOL) injection 30 mg    ondansetron (ZOFRAN) injection 4 mg    [DISCONTINUED] trimethobenzamide (TIGAN) IM injection 200 mg     Medications Prior to Admission   Medication    albuterol (PROVENTIL HFA,VENTOLIN HFA) 90 mcg/act inhaler    aspirin 81 mg chewable tablet    atorvastatin (LIPITOR) 40 mg tablet    baclofen 10 mg tablet    cholestyramine (QUESTRAN) 4 g packet    escitalopram (Lexapro) 20 mg tablet    gabapentin (NEURONTIN) 300 mg capsule    glucose blood test strip    latanoprost (XALATAN) 0 005 % ophthalmic solution    levothyroxine 25 mcg tablet    lisinopril (ZESTRIL) 20 mg tablet    magnesium Oxide (MAG-OX) 400 mg TABS    meclizine (ANTIVERT) 25 mg tablet    metFORMIN (GLUCOPHAGE) 1000 MG tablet    metoclopramide (REGLAN) 10 mg tablet    metoprolol tartrate (LOPRESSOR) 25 mg tablet    Milnacipran HCl (Savella) 100 MG TABS    nystatin (MYCOSTATIN) powder    ondansetron (ZOFRAN) 4 mg/2 mL injection    ondansetron (ZOFRAN) 8 mg tablet    oxyCODONE-acetaminophen (PERCOCET)  mg per tablet    pantoprazole (PROTONIX) 40 mg tablet    promethazine (PHENERGAN) 25 mg suppository    saccharomyces boulardii (FLORASTOR) 250 mg capsule    trospium chloride (SANCTURA) 20 mg tablet    zolpidem (AMBIEN) 10 mg tablet     Current Facility-Administered Medications   Medication Dose Route Frequency    acetaminophen (TYLENOL) tablet 650 mg  650 mg Oral Q6H PRN    aluminum-magnesium hydroxide-simethicone (MYLANTA) oral suspension 30 mL  30 mL Oral Q6H PRN    enoxaparin (LOVENOX) subcutaneous injection 40 mg  40 mg Subcutaneous Daily    insulin lispro (HumaLOG) 100 units/mL subcutaneous injection 1-6 Units  1-6 Units Subcutaneous TID AC    insulin lispro (HumaLOG) 100 units/mL subcutaneous injection 1-6 Units  1-6 Units Subcutaneous HS    lactated ringers infusion  125 mL/hr Intravenous Continuous    ondansetron (ZOFRAN) injection 4 mg  4 mg Intravenous Q6H PRN    pantoprazole (PROTONIX) injection 40 mg  40 mg Intravenous Q12H [address]       Allergies   Allergen Reactions    Penicillins Anaphylaxis, Hives and Other (See Comments)     Other reaction(s): Unknown Reaction    Sulfa Antibiotics Anaphylaxis and Other (See Comments)     Other reaction(s): Unknown Reaction    Aspartame - Food Allergy Other (See Comments) and Hypertension     Slurred speech, weakness, stroke sx    Iodinated Diagnostic Agents Hives     Pt has taken prep prior for contrast and has not had any break through reaction    Keflex [Cephalexin] Hives    Levaquin [Levofloxacin] Rash           Objective     not currently breastfeeding  No intake or output data in the 24 hours ending 07/14/22 1306      PHYSICAL EXAM:      General Appearance:   Lying comfortably in bed    Does not appear in distress      HEENT:   Normocephalic, atraumatic, anicteric, no oropharyngeal thrush present      Neck:  Supple, symmetrical, trachea midline, no adenopathy;    thyroid: no enlargement/tenderness/nodules; no carotid  bruit or JVD    Lungs:   Clear to auscultation bilaterally; no rales, rhonchi or wheezing; respirations unlabored    Heart[de-identified]   S1 and S2 normal; regular rate and rhythm; no murmur, rub, or gallop  Abdomen:   + reports some tenderness to palpation in the upper abdomen  No distention  Soft  Genitalia:   Deferred    Rectal:   Deferred    Extremities:  No cyanosis, clubbing or edema    Pulses:  2+ and symmetric all extremities    Skin:  Skin color, texture, turgor normal, no rashes or lesions    Lymph nodes:  No palpable cervical, axillary or inguinal lymphadenopathy        Lab Results:             Invalid input(s): LABALBU            Imaging Studies: I have personally reviewed pertinent imaging studies  No results found  Patient was seen and examined by Dr Carol Allred  All smith medical decisions were made by Dr Carol Allred  Thank you for allowing us to participate in the care of this present patient  We will follow-up with you closely

## 2022-07-14 NOTE — ASSESSMENT & PLAN NOTE
Patient takes Percocet at home as needed once or twice a day  At this time will continue as needed until further evaluation is done  Patient complains of headache 9/10, abdominal pain 8/10, lower back pain 6/10  Requesting for some pain medication to relieve the pain  I have ordered Dilaudid for severe pain and oxycodone for moderate pain    Will reassess pain

## 2022-07-14 NOTE — PLAN OF CARE
Problem: Potential for Falls  Goal: Patient will remain free of falls  Description: INTERVENTIONS:  - Educate patient/family on patient safety including physical limitations  - Instruct patient to call for assistance with activity   - Consult OT/PT to assist with strengthening/mobility   - Keep Call bell within reach  - Keep bed low and locked with side rails adjusted as appropriate  - Keep care items and personal belongings within reach  - Initiate and maintain comfort rounds  - Make Fall Risk Sign visible to staff  - Offer Toileting every  Hours, in advance of need  - Initiate/Maintain alarm  - Obtain necessary fall risk management equipment:   - Apply yellow socks and bracelet for high fall risk patients  - Consider moving patient to room near nurses station  7/14/2022 1432 by Hernandez Guerra RN  Outcome: Progressing  7/14/2022 1431 by Hernandez Guerra RN  Outcome: Progressing     Problem: PAIN - ADULT  Goal: Verbalizes/displays adequate comfort level or baseline comfort level  Description: Interventions:  - Encourage patient to monitor pain and request assistance  - Assess pain using appropriate pain scale  - Administer analgesics based on type and severity of pain and evaluate response  - Implement non-pharmacological measures as appropriate and evaluate response  - Consider cultural and social influences on pain and pain management  - Notify physician/advanced practitioner if interventions unsuccessful or patient reports new pain  7/14/2022 1432 by Hernandez Guerra RN  Outcome: Progressing  7/14/2022 1431 by Hernandez Guerra RN  Outcome: Progressing     Problem: DISCHARGE PLANNING  Goal: Discharge to home or other facility with appropriate resources  Description: INTERVENTIONS:  - Identify barriers to discharge w/patient and caregiver  - Arrange for needed discharge resources and transportation as appropriate  - Identify discharge learning needs (meds, wound care, etc )  - Arrange for interpretive services to assist at discharge as needed  - Refer to Case Management Department for coordinating discharge planning if the patient needs post-hospital services based on physician/advanced practitioner order or complex needs related to functional status, cognitive ability, or social support system  7/14/2022 1432 by Faye Fajardo RN  Outcome: Progressing  7/14/2022 1431 by Faye Fajardo RN  Outcome: Progressing     Problem: GASTROINTESTINAL - ADULT  Goal: Minimal or absence of nausea and/or vomiting  Description: INTERVENTIONS:  - Administer IV fluids if ordered to ensure adequate hydration  - Maintain NPO status until nausea and vomiting are resolved  - Nasogastric tube if ordered  - Administer ordered antiemetic medications as needed  - Provide nonpharmacologic comfort measures as appropriate  - Advance diet as tolerated, if ordered  - Consider nutrition services referral to assist patient with adequate nutrition and appropriate food choices  7/14/2022 1432 by Faye Fajardo RN  Outcome: Progressing  7/14/2022 1431 by Faye Fajardo RN  Outcome: Progressing     Problem: SKIN/TISSUE INTEGRITY - ADULT  Goal: Skin Integrity remains intact(Skin Breakdown Prevention)  Description: Assess:  -Perform Jack assessment every   -Clean and moisturize skin every   -Inspect skin when repositioning, toileting, and assisting with ADLS  -Assess under medical devices such as  every   -Assess extremities for adequate circulation and sensation     Bed Management:  -Have minimal linens on bed & keep smooth, unwrinkled  -Change linens as needed when moist or perspiring  -Avoid sitting or lying in one position for more than  hours while in bed  -Keep HOB at degrees     Toileting:  -Offer bedside commode  -Assess for incontinence every   -Use incontinent care products after each incontinent episode such as     Activity:  -Mobilize patient  times a day  -Encourage activity and walks on unit  -Encourage or provide ROM exercises   -Turn and reposition patient every  Hours  -Use appropriate equipment to lift or move patient in bed  -Instruct/ Assist with weight shifting every  when out of bed in chair  -Consider limitation of chair time  hour intervals    Skin Care:  -Avoid use of baby powder, tape, friction and shearing, hot water or constrictive clothing  -Relieve pressure over bony prominences using   -Do not massage red bony areas    Next Steps:  -Teach patient strategies to minimize risks such as    -Consider consults to  interdisciplinary teams such s  Outcome: Progressing

## 2022-07-14 NOTE — QUICK NOTE
Reviewed patient's labs, remain within normal limits  CT abdomen and pelvis no acute findings  EKG shows prolonged QTC, Zofran discontinued and started on Tigan  Will monitor the patient on telemetry secondary to prolonged QTC  Seen by Gastroenterology and scheduled for EGD tomorrow

## 2022-07-15 ENCOUNTER — ANESTHESIA EVENT (INPATIENT)
Dept: GASTROENTEROLOGY | Facility: HOSPITAL | Age: 77
DRG: 384 | End: 2022-07-15
Payer: COMMERCIAL

## 2022-07-15 ENCOUNTER — ANESTHESIA (INPATIENT)
Dept: GASTROENTEROLOGY | Facility: HOSPITAL | Age: 77
DRG: 384 | End: 2022-07-15
Payer: COMMERCIAL

## 2022-07-15 ENCOUNTER — APPOINTMENT (INPATIENT)
Dept: GASTROENTEROLOGY | Facility: HOSPITAL | Age: 77
DRG: 384 | End: 2022-07-15
Payer: COMMERCIAL

## 2022-07-15 LAB
ATRIAL RATE: 88 BPM
ATRIAL RATE: 94 BPM
ATRIAL RATE: 98 BPM
ATRIAL RATE: 99 BPM
GLUCOSE SERPL-MCNC: 109 MG/DL (ref 65–140)
GLUCOSE SERPL-MCNC: 125 MG/DL (ref 65–140)
GLUCOSE SERPL-MCNC: 140 MG/DL (ref 65–140)
GLUCOSE SERPL-MCNC: 164 MG/DL (ref 65–140)
P AXIS: -17 DEGREES
P AXIS: 25 DEGREES
P AXIS: 29 DEGREES
P AXIS: 37 DEGREES
PR INTERVAL: 128 MS
PR INTERVAL: 130 MS
PR INTERVAL: 132 MS
PR INTERVAL: 132 MS
QRS AXIS: -14 DEGREES
QRS AXIS: -36 DEGREES
QRS AXIS: -36 DEGREES
QRS AXIS: -37 DEGREES
QRSD INTERVAL: 124 MS
QRSD INTERVAL: 126 MS
QRSD INTERVAL: 128 MS
QRSD INTERVAL: 128 MS
QT INTERVAL: 412 MS
QT INTERVAL: 414 MS
QT INTERVAL: 426 MS
QT INTERVAL: 446 MS
QTC INTERVAL: 528 MS
QTC INTERVAL: 528 MS
QTC INTERVAL: 535 MS
QTC INTERVAL: 557 MS
T WAVE AXIS: 100 DEGREES
T WAVE AXIS: 102 DEGREES
T WAVE AXIS: 103 DEGREES
T WAVE AXIS: 92 DEGREES
VENTRICULAR RATE: 94 BPM
VENTRICULAR RATE: 95 BPM
VENTRICULAR RATE: 98 BPM
VENTRICULAR RATE: 99 BPM

## 2022-07-15 PROCEDURE — 82948 REAGENT STRIP/BLOOD GLUCOSE: CPT

## 2022-07-15 PROCEDURE — 93010 ELECTROCARDIOGRAM REPORT: CPT | Performed by: INTERNAL MEDICINE

## 2022-07-15 PROCEDURE — C9113 INJ PANTOPRAZOLE SODIUM, VIA: HCPCS | Performed by: INTERNAL MEDICINE

## 2022-07-15 PROCEDURE — 88305 TISSUE EXAM BY PATHOLOGIST: CPT | Performed by: PATHOLOGY

## 2022-07-15 PROCEDURE — 93005 ELECTROCARDIOGRAM TRACING: CPT

## 2022-07-15 PROCEDURE — 88312 SPECIAL STAINS GROUP 1: CPT | Performed by: PATHOLOGY

## 2022-07-15 PROCEDURE — 43239 EGD BIOPSY SINGLE/MULTIPLE: CPT | Performed by: INTERNAL MEDICINE

## 2022-07-15 PROCEDURE — 0DB58ZX EXCISION OF ESOPHAGUS, VIA NATURAL OR ARTIFICIAL OPENING ENDOSCOPIC, DIAGNOSTIC: ICD-10-PCS | Performed by: INTERNAL MEDICINE

## 2022-07-15 PROCEDURE — 0DB68ZX EXCISION OF STOMACH, VIA NATURAL OR ARTIFICIAL OPENING ENDOSCOPIC, DIAGNOSTIC: ICD-10-PCS | Performed by: INTERNAL MEDICINE

## 2022-07-15 PROCEDURE — 99233 SBSQ HOSP IP/OBS HIGH 50: CPT | Performed by: INTERNAL MEDICINE

## 2022-07-15 RX ORDER — PROPOFOL 10 MG/ML
INJECTION, EMULSION INTRAVENOUS AS NEEDED
Status: DISCONTINUED | OUTPATIENT
Start: 2022-07-15 | End: 2022-07-15

## 2022-07-15 RX ORDER — EPINEPHRINE 0.1 MG/ML
SYRINGE (ML) INJECTION CODE/TRAUMA/SEDATION MEDICATION
Status: COMPLETED | OUTPATIENT
Start: 2022-07-15 | End: 2022-07-15

## 2022-07-15 RX ORDER — LIDOCAINE HYDROCHLORIDE 10 MG/ML
INJECTION, SOLUTION EPIDURAL; INFILTRATION; INTRACAUDAL; PERINEURAL AS NEEDED
Status: DISCONTINUED | OUTPATIENT
Start: 2022-07-15 | End: 2022-07-15

## 2022-07-15 RX ORDER — PROPOFOL 10 MG/ML
INJECTION, EMULSION INTRAVENOUS CONTINUOUS PRN
Status: DISCONTINUED | OUTPATIENT
Start: 2022-07-15 | End: 2022-07-15

## 2022-07-15 RX ADMIN — ESCITALOPRAM OXALATE 10 MG: 10 TABLET ORAL at 08:27

## 2022-07-15 RX ADMIN — GABAPENTIN 300 MG: 300 CAPSULE ORAL at 17:25

## 2022-07-15 RX ADMIN — MAGNESIUM OXIDE TAB 400 MG (241.3 MG ELEMENTAL MG) 400 MG: 400 (241.3 MG) TAB at 17:25

## 2022-07-15 RX ADMIN — LISINOPRIL 20 MG: 20 TABLET ORAL at 08:26

## 2022-07-15 RX ADMIN — LIDOCAINE HYDROCHLORIDE 50 MG: 10 INJECTION, SOLUTION EPIDURAL; INFILTRATION; INTRACAUDAL at 14:03

## 2022-07-15 RX ADMIN — PANTOPRAZOLE SODIUM 40 MG: 40 INJECTION, POWDER, FOR SOLUTION INTRAVENOUS at 21:37

## 2022-07-15 RX ADMIN — INSULIN LISPRO 1 UNITS: 100 INJECTION, SOLUTION INTRAVENOUS; SUBCUTANEOUS at 21:35

## 2022-07-15 RX ADMIN — PROPOFOL 80 MCG/KG/MIN: 10 INJECTION, EMULSION INTRAVENOUS at 14:05

## 2022-07-15 RX ADMIN — PROPOFOL 30 MG: 10 INJECTION, EMULSION INTRAVENOUS at 14:05

## 2022-07-15 RX ADMIN — ATORVASTATIN CALCIUM 40 MG: 40 TABLET, FILM COATED ORAL at 15:36

## 2022-07-15 RX ADMIN — GABAPENTIN 300 MG: 300 CAPSULE ORAL at 08:27

## 2022-07-15 RX ADMIN — PANTOPRAZOLE SODIUM 40 MG: 40 INJECTION, POWDER, FOR SOLUTION INTRAVENOUS at 08:26

## 2022-07-15 RX ADMIN — PROPOFOL 50 MG: 10 INJECTION, EMULSION INTRAVENOUS at 14:03

## 2022-07-15 RX ADMIN — SODIUM CHLORIDE, POTASSIUM CHLORIDE, SODIUM LACTATE AND CALCIUM CHLORIDE 75 ML/HR: 600; 310; 30; 20 INJECTION, SOLUTION INTRAVENOUS at 16:06

## 2022-07-15 RX ADMIN — ZOLPIDEM TARTRATE 5 MG: 5 TABLET ORAL at 22:14

## 2022-07-15 RX ADMIN — LATANOPROST 1 DROP: 50 SOLUTION OPHTHALMIC at 21:38

## 2022-07-15 RX ADMIN — EPINEPHRINE 0.2 MG: 0.1 INJECTION, SOLUTION ENDOTRACHEAL; INTRACARDIAC; INTRAVENOUS at 14:10

## 2022-07-15 RX ADMIN — ALUMINA, MAGNESIA, AND SIMETHICONE ORAL SUSPENSION REGULAR STRENGTH 30 ML: 1200; 1200; 120 SUSPENSION ORAL at 16:11

## 2022-07-15 RX ADMIN — Medication 12.5 MG: at 08:26

## 2022-07-15 RX ADMIN — MAGNESIUM OXIDE TAB 400 MG (241.3 MG ELEMENTAL MG) 400 MG: 400 (241.3 MG) TAB at 08:26

## 2022-07-15 RX ADMIN — SODIUM CHLORIDE, POTASSIUM CHLORIDE, SODIUM LACTATE AND CALCIUM CHLORIDE 75 ML/HR: 600; 310; 30; 20 INJECTION, SOLUTION INTRAVENOUS at 08:32

## 2022-07-15 RX ADMIN — Medication 12.5 MG: at 21:37

## 2022-07-15 NOTE — PROGRESS NOTES
[facility]  Progress Note - Jorge Georges 1945, 76 y o  female MRN: [id number]  Unit/Bed#: S -01 Encounter: [id number]  Primary Care Provider: Rosmery Correia   Date and time admitted to hospital: 2022 12:24 PM    * Intractable nausea and vomiting  Assessment & Plan  · PPI BID  · EGD today  · GI on board  · Advance diet as tolerated after EGD  · Prn Tigan for nausea  · IV fluid  · Lipase normal   · CT abdomen unremarkable  PUD (peptic ulcer disease)  Assessment & Plan  History  Plan as above    Hypertension  Assessment & Plan  Continue on metoprolol and lisinopril  Chronic pain disorder  Assessment & Plan  · Patient takes Percocet at home as needed once or twice a day  · Geriatric pain regimen is ordered     Type 2 diabetes mellitus [facility])  Assessment & Plan  Lab Results   Component Value Date    HGBA1C 8 6 (H) 2022       Recent Labs     22  1414 22  1654 22  2219 07/15/22  0740   POCGLU 140 129 122 109       Blood Sugar Average: Last 72 hrs:  (P) 125 currently takes metformin at home, last A1c was 8 6  Will hold metformin and give sliding scale insulin Accu-Cheks  VTE Pharmacologic Prophylaxis:   VTE Score: 4 Moderate Risk (Score 3-4) - Pharmacological DVT Prophylaxis Ordered: Enoxaparin (Lovenox)  Mechanical VTE Prophylaxis in Place: Yes    Patient Centered Rounds: I have performed bedside rounds with nursing staff today  Discussions with Specialists or Other Care Team Provider: GI    Education and Discussions with Family / Patient: Updated  (daughter) via phone  Current Length of Stay: 1 day(s)    Current Patient Status: Inpatient     Discharge Plan / Estimated Discharge Date: Anticipate discharge in 48 hrs to home  Code Status: Level 1 - Full Code      Subjective:   No acute events   Continue to have nausea and abdominal pain    Objective:     Vitals:   Temp (24hrs), Av °F (37 2 °C), Min:98 3 °F (36 8 °C), Max:99 7 °F (37 6 °C)    Temp:  [98 3 °F (36 8 °C)-99 7 °F (37 6 °C)] 98 3 °F (36 8 °C)  HR:  [] 70  Resp:  [17-18] 17  BP: (141-171)/(69-82) 156/69  SpO2:  [92 %-96 %] 96 %  There is no height or weight on file to calculate BMI  Input and Output Summary (last 24 hours): Intake/Output Summary (Last 24 hours) at 7/15/2022 0855  Last data filed at 7/14/2022 2224  Gross per 24 hour   Intake 1000 ml   Output --   Net 1000 ml       Physical Exam:     Physical Exam  Vitals and nursing note reviewed  Constitutional:       General: She is not in acute distress  Appearance: She is well-developed  HENT:      Head: Normocephalic and atraumatic  Eyes:      Conjunctiva/sclera: Conjunctivae normal    Cardiovascular:      Rate and Rhythm: Normal rate and regular rhythm  Heart sounds: No murmur heard  Pulmonary:      Effort: Pulmonary effort is normal  No respiratory distress  Breath sounds: Normal breath sounds  Abdominal:      Palpations: Abdomen is soft  Tenderness: There is abdominal tenderness (mild epigastric and periumbilical )  There is no guarding or rebound  Musculoskeletal:      Cervical back: Neck supple  Skin:     General: Skin is warm and dry  Neurological:      Mental Status: She is alert     Psychiatric:         Mood and Affect: Mood normal          Behavior: Behavior normal           Additional Data:     Labs:  Results from last 7 days   Lab Units 07/14/22  1330   WBC Thousand/uL 8 06   HEMOGLOBIN g/dL 11 7   HEMATOCRIT % 37 6   PLATELETS Thousands/uL 329   NEUTROS PCT % 71   LYMPHS PCT % 18   MONOS PCT % 8   EOS PCT % 1     Results from last 7 days   Lab Units 07/14/22  1330   SODIUM mmol/L 141   POTASSIUM mmol/L 4 4   CHLORIDE mmol/L 105   CO2 mmol/L 30   BUN mg/dL 22   CREATININE mg/dL 1 07   ANION GAP mmol/L 6   CALCIUM mg/dL 9 1   ALBUMIN g/dL 3 7   TOTAL BILIRUBIN mg/dL 0 47   ALK PHOS U/L 98   ALT U/L 11   AST U/L 17   GLUCOSE RANDOM mg/dL 131 Results from last 7 days   Lab Units 07/15/22  0740 07/14/22  2219 07/14/22  1654 07/14/22  1414   POC GLUCOSE mg/dl 109 122 129 140         Results from last 7 days   Lab Units 07/14/22  1330   LACTIC ACID mmol/L 0 9       Imaging: Reviewed radiology reports from this admission including: abdominal/pelvic CT scan    Recent Cultures (last 7 days):           Lines/Drains:  Invasive Devices  Report    Peripheral Intravenous Line  Duration           Peripheral IV 07/14/22 Proximal;Right;Ventral (anterior) Antecubital <1 day                Telemetry:        Last 24 Hours Medication List:   Current Facility-Administered Medications   Medication Dose Route Frequency Provider Last Rate    acetaminophen  650 mg Oral Q6H PRN Phylicia Jaimes MD      albuterol  2 puff Inhalation Q6H PRN Phylicia Jaimes MD      aluminum-magnesium hydroxide-simethicone  30 mL Oral Q6H PRN Phylicia Jaimes MD      atorvastatin  40 mg Oral Daily With Gricelda Schwartz MD      baclofen  5 mg Oral BID PRN Phylicia Jaimes MD      enoxaparin  40 mg Subcutaneous Daily Reese Peña MD      escitalopram  10 mg Oral Daily Phylicia Jaimes MD      gabapentin  300 mg Oral BID Phylicia Jaimes MD      HYDROmorphone  0 2 mg Intravenous Q6H PRN Phylicia Jaimes MD      insulin lispro  1-6 Units Subcutaneous TID AC Collette Esparza MD      insulin lispro  1-6 Units Subcutaneous HS Phylicia Jaimes MD      lactated ringers  75 mL/hr Intravenous Continuous Reese Peña MD 75 mL/hr (07/15/22 0832)    latanoprost  1 drop Both Eyes HS Collette Esparza MD      levothyroxine  37 5 mcg Oral Early Morning Phylicia Jaimes MD      lisinopril  20 mg Oral Daily Phylicia Jaimes MD      magnesium oxide  400 mg Oral BID Phylicia Jaimes MD      meclizine  25 mg Oral 4x Daily PRN Phylicia Jaimes MD      metoprolol tartrate  12 5 mg Oral Q12H [address] Phylicia Jaimes MD      Milnacipran HCl  1 tablet Oral Daily Ronnie Barney MD      oxyCODONE  5 mg Oral Q6H PRN Ronnie Barney MD      pantoprazole  40 mg Intravenous Q12H [address], MD      trimethobenzamide  200 mg Intramuscular Q6H PRN Ronnie Barney MD      zolpidem  5 mg Oral HS PRN Ronnie Barney MD          Today, Patient Was Seen By: Zia Junior MD    ** Please Note: This note has been constructed using a voice recognition system   **

## 2022-07-15 NOTE — PLAN OF CARE
Problem: Potential for Falls  Goal: Patient will remain free of falls  Description: INTERVENTIONS:  - Educate patient/family on patient safety including physical limitations  - Instruct patient to call for assistance with activity   - Consult OT/PT to assist with strengthening/mobility   - Keep Call bell within reach  - Keep bed low and locked with side rails adjusted as appropriate  - Keep care items and personal belongings within reach  - Initiate and maintain comfort rounds  - Make Fall Risk Sign visible to staff  - Offer Toileting every  Hours, in advance of need  - Initiate/Maintain alarm  - Obtain necessary fall risk management equipment  - Apply yellow socks and bracelet for high fall risk patients  - Consider moving patient to room near nurses station  Outcome: Progressing     Problem: PAIN - ADULT  Goal: Verbalizes/displays adequate comfort level or baseline comfort level  Description: Interventions:  - Encourage patient to monitor pain and request assistance  - Assess pain using appropriate pain scale  - Administer analgesics based on type and severity of pain and evaluate response  - Implement non-pharmacological measures as appropriate and evaluate response  - Consider cultural and social influences on pain and pain management  - Notify physician/advanced practitioner if interventions unsuccessful or patient reports new pain  Outcome: Progressing     Problem: DISCHARGE PLANNING  Goal: Discharge to home or other facility with appropriate resources  Description: INTERVENTIONS:  - Identify barriers to discharge w/patient and caregiver  - Arrange for needed discharge resources and transportation as appropriate  - Identify discharge learning needs (meds, wound care, etc )  - Arrange for interpretive services to assist at discharge as needed  - Refer to Case Management Department for coordinating discharge planning if the patient needs post-hospital services based on physician/advanced practitioner order or complex needs related to functional status, cognitive ability, or social support system  Outcome: Progressing     Problem: GASTROINTESTINAL - ADULT  Goal: Minimal or absence of nausea and/or vomiting  Description: INTERVENTIONS:  - Administer IV fluids if ordered to ensure adequate hydration  - Maintain NPO status until nausea and vomiting are resolved  - Nasogastric tube if ordered  - Administer ordered antiemetic medications as needed  - Provide nonpharmacologic comfort measures as appropriate  - Advance diet as tolerated, if ordered  - Consider nutrition services referral to assist patient with adequate nutrition and appropriate food choices  Outcome: Progressing

## 2022-07-15 NOTE — ASSESSMENT & PLAN NOTE
· PPI BID  · EGD today  · GI on board  · Advance diet as tolerated after EGD  · Prn Tigan for nausea  · IV fluid  · Lipase normal   · CT abdomen unremarkable

## 2022-07-15 NOTE — ASSESSMENT & PLAN NOTE
Lab Results   Component Value Date    HGBA1C 8 6 (H) 04/22/2022       Recent Labs     07/14/22  1414 07/14/22  1654 07/14/22  2219 07/15/22  0740   POCGLU 140 129 122 109       Blood Sugar Average: Last 72 hrs:  (P) 125 currently takes metformin at home, last A1c was 8 6  Will hold metformin and give sliding scale insulin Accu-Cheks

## 2022-07-15 NOTE — ANESTHESIA PREPROCEDURE EVALUATION
Procedure:  EGD    Relevant Problems   CARDIO   (+) High cholesterol   (+) Hypertension      ENDO   (+) Hypothyroidism   (+) Type 2 diabetes mellitus (HCC)      GI/HEPATIC   (+) PUD (peptic ulcer disease)      HEMATOLOGY   (+) Anemia   (+) Iron deficiency anemia secondary to inadequate dietary iron intake      MUSCULOSKELETAL   (+) Failed back surgical syndrome      NEURO/PSYCH   (+) Anxiety   (+) Chronic pain disorder      PULMONARY   (+) Mild intermittent asthma without complication      Other   (+) Degenerative lumbar spinal stenosis     BMI 34       Physical Exam    Airway    Mallampati score: II  TM Distance: <3 FB  Neck ROM: full     Dental   upper dentures and lower dentures,     Cardiovascular      Pulmonary      Other Findings        Anesthesia Plan  ASA Score- 3     Anesthesia Type- IV sedation with anesthesia with ASA Monitors  Additional Monitors:   Airway Plan:           Plan Factors-    Chart reviewed  Patient summary reviewed  Induction- intravenous  Postoperative Plan-     Informed Consent- Anesthetic plan and risks discussed with patient  I personally reviewed this patient with the CRNA  Discussed and agreed on the Anesthesia Plan with the CRNA  Dara Soulier

## 2022-07-15 NOTE — ANESTHESIA POSTPROCEDURE EVALUATION
Post-Op Assessment Note    CV Status:  Stable    Pain management: adequate     Mental Status:  Alert and awake   Hydration Status:  Euvolemic   PONV Controlled:  Controlled   Airway Patency:  Patent      Post Op Vitals Reviewed: Yes      Staff: CRNA         No complications documented      BP   162/71   Temp     Pulse  79   Resp      SpO2   100

## 2022-07-15 NOTE — ASSESSMENT & PLAN NOTE
· Patient takes Percocet at home as needed once or twice a day    · Geriatric pain regimen is ordered

## 2022-07-15 NOTE — UTILIZATION REVIEW
Initial Clinical Review    Admission: Date/Time/Statement:   Admission Orders (From admission, onward)     Ordered        07/14/22 1235  Inpatient Admission  Once                      Orders Placed This Encounter   Procedures    Inpatient Admission     Standing Status:   Standing     Number of Occurrences:   1     Order Specific Question:   Level of Care     Answer:   Med Surg [16]     Order Specific Question:   Estimated length of stay     Answer:   More than 2 Midnights     Order Specific Question:   Certification     Answer:   I certify that inpatient services are medically necessary for this patient for a duration of greater than two midnights  See H&P and MD Progress Notes for additional information about the patient's course of treatment  ED Arrival Information     Patient not seen in ED                     No chief complaint on file  Initial Presentation: 76 y o  female presented to the ED with intractable nausea and vomiting  Per patient symptoms started 2 days ago, multiple episodes, nausea, vomiting, no hematemesis  Unable to keep anything down  Complains of abdominal pain mostly lower abdomen, 8/10 in severity  Complains of headache frontal, 6/10 in severity, lower back pain 9/10 in severity  PMH: anxiety, asthma, DM, fibromyalgia, gastroparesis, hypercholesterolemia, HLD, HTN, IBS  Plan: Inpatient admission for evaluation and treatment of intractable nausea and vomiting: clear liquid diet, IV fluids, IV [protonix, consult GI, CT abd and pelvis, EKG showed prolonged QTC will monitor on telemetry  GI consult: NPO after midnight with plans for EGD tomorrow  Hold morning Lovenox  Continue IV protonix bid  Date: 7/15   Day 2:     Internal medicine: IV protonix bid, for EGD today, advance diet as tolerated after EGD, IV fluids, Tigan for nausea, continue metoprolol and lisinopril      EGD:  IMPRESSION:  · Peptic stricture (traversable) in the middle third of the esophagus, lower third of the esophagus and GE junction; performed cold forceps biopsy  3 peptic strictures with normal intervening mucosa noted in the esophagus  · Single large, cratered, round ulcer in the prepyloric region with flat pigmented spot (Harjit IIC); performed cold forceps biopsy  · The duodenal bulb and 2nd part of the duodenum appeared normal     RECOMMENDATION:  Await pathology results  Follow up biopsies  IV PPI BID, then transition to PO PPI BID  Liquid carafate  Continue antiemetics  Clear liquid diet  Monitor hgb, transfuse if needed    ED Triage Vitals   Temperature Pulse Respirations Blood Pressure SpO2   07/14/22 1608 07/14/22 1608 07/14/22 1608 07/14/22 1608 07/14/22 1923   99 7 °F (37 6 °C) 92 18 153/80 95 %      Temp Source Heart Rate Source Patient Position - Orthostatic VS BP Location FiO2 (%)   07/14/22 1608 07/14/22 1608 07/14/22 1608 07/14/22 1608 --   Oral Monitor Lying Left arm       Pain Score       07/14/22 1239       9          Wt Readings from Last 1 Encounters:   07/13/22 79 4 kg (175 lb)     Additional Vital Signs:     Date/Time Temp Pulse Resp BP MAP (mmHg) SpO2 O2 Device   07/15/22 07:27:52 98 3 °F (36 8 °C) 70 -- 156/69 98 96 % --   07/14/22 21:32:43 98 9 °F (37 2 °C) 91 17 141/77 98 92 % --   07/14/22 19:23:01 99 °F (37 2 °C) 119 Abnormal  17 171/82 Abnormal  112 95 % --   07/14/22 1608 99 7 °F (37 6 °C) 92 18 153/80 104 -- --       Pertinent Labs/Diagnostic Test Results:   XR chest portable   Final Result by Deni Figueredo MD (07/15 7081)      No acute cardiopulmonary disease  Workstation performed: [id number]         CT abdomen pelvis wo contrast   Final Result by Cathy Coello MD (07/14 1613)      No acute intra-abdominal finding              Workstation performed: [id number]           7/15 Repeat EKG:  Sinus rhythm with Premature atrial complexes  Non-specific intra-ventricular conduction block  Abnormal QRS-T angle, consider primary T wave abnormality  Abnormal ECG  When compared with ECG of 14-JUL-2022 17:35, (unconfirmed)  Premature atrial complexes are now Present    7/14 EKG:  Sinus rhythm with frequent Premature atrial complexes  Left axis deviation  Non-specific intra-ventricular conduction block  Abnormal QRS-T angle, consider primary T wave abnormality  Abnormal ECG  When compared with ECG of 18-JUN-2022 00:43,  Premature ventricular complexes are now Present  QRS axis Shifted right  T wave inversion now evident in Lateral leads      Results from last 7 days   Lab Units 07/14/22  1330   WBC Thousand/uL 8 06   HEMOGLOBIN g/dL 11 7   HEMATOCRIT % 37 6   PLATELETS Thousands/uL 329   NEUTROS ABS Thousands/µL 5 76         Results from last 7 days   Lab Units 07/14/22  1330   SODIUM mmol/L 141   POTASSIUM mmol/L 4 4   CHLORIDE mmol/L 105   CO2 mmol/L 30   ANION GAP mmol/L 6   BUN mg/dL 22   CREATININE mg/dL 1 07   EGFR ml/min/1 73sq m 49   CALCIUM mg/dL 9 1     Results from last 7 days   Lab Units 07/14/22  1330   AST U/L 17   ALT U/L 11   ALK PHOS U/L 98   TOTAL PROTEIN g/dL 6 8   ALBUMIN g/dL 3 7   TOTAL BILIRUBIN mg/dL 0 47     Results from last 7 days   Lab Units 07/15/22  1110 07/15/22  0740 07/14/22  2219 07/14/22  1654 07/14/22  1414   POC GLUCOSE mg/dl 140 109 122 129 140     Results from last 7 days   Lab Units 07/14/22  1330   GLUCOSE RANDOM mg/dL 131           Results from last 7 days   Lab Units 07/14/22  1739 07/14/22  1330   HS TNI 0HR ng/L  --  23   HS TNI 4HR ng/L 23  --    HSTNI D4 ng/L 0  --              Results from last 7 days   Lab Units 07/14/22  1330   TSH 3RD GENERATON uIU/mL 0 854         Results from last 7 days   Lab Units 07/14/22  1330   LACTIC ACID mmol/L 0 9           Results from last 7 days   Lab Units 07/14/22  1330   LIPASE u/L 65         ED Treatment:   Medication Administration - No Administrations Displayed (No Start Event Found)     None        Past Medical History:   Diagnosis Date    Acid reflux     Anemia     hx of iron-deficient    Anxiety     Arthritis     Asthma     last needed inhaler last year 2020    Basal cell carcinoma     upper lip    Chronic narcotic dependence (HCC)     Chronic pain     Colon polyp     Cystocele     Diabetes mellitus (HCC)     stable    Disease of thyroid gland     hypothyroidism    Diverticulosis     Dysfunctional uterine bleeding     last assessed - [id number]    Fibromyalgia     Gastric ulcer     Gastroparesis     History of colonic polyps     last assessed - [id number]    History of gastroesophageal reflux (GERD)     Hypercholesterolemia     Hyperlipidemia     Hypertension     IBS (irritable bowel syndrome)     Post laminectomy syndrome     Seasonal allergies     Spinal stenosis      Present on Admission:   Type 2 diabetes mellitus (HCC)   Chronic pain disorder   Hypertension   PUD (peptic ulcer disease)   Intractable nausea and vomiting      Admitting Diagnosis: Dehydration [E86 0]  Age/Sex: 76 y o  female  Admission Orders:  Scheduled Medications:  atorvastatin, 40 mg, Oral, Daily With Dinner  enoxaparin, 40 mg, Subcutaneous, Daily  escitalopram, 10 mg, Oral, Daily  gabapentin, 300 mg, Oral, BID  insulin lispro, 1-6 Units, Subcutaneous, TID AC  insulin lispro, 1-6 Units, Subcutaneous, HS  latanoprost, 1 drop, Both Eyes, HS  levothyroxine, 37 5 mcg, Oral, Early Morning  lisinopril, 20 mg, Oral, Daily  magnesium oxide, 400 mg, Oral, BID  metoprolol tartrate, 12 5 mg, Oral, Q12H ARY  Milnacipran HCl, 1 tablet, Oral, Daily  pantoprazole, 40 mg, Intravenous, Q12H ARY      Continuous IV Infusions:  lactated ringers, 75 mL/hr, Intravenous, Continuous      PRN Meds:  acetaminophen, 650 mg, Oral, Q6H PRN  albuterol, 2 puff, Inhalation, Q6H PRN  aluminum-magnesium hydroxide-simethicone, 30 mL, Oral, Q6H PRN  baclofen, 5 mg, Oral, BID PRN  HYDROmorphone, 0 2 mg, Intravenous, Q6H PRN  meclizine, 25 mg, Oral, 4x Daily PRN  oxyCODONE, 5 mg, Oral, Q6H PRN  trimethobenzamide, 200 mg, Intramuscular, Q6H PRN  zolpidem, 5 mg, Oral, HS PRN        IP CONSULT TO GASTROENTEROLOGY    Network Utilization Review Department  ATTENTION: Please call with any questions or concerns to [phone] and carefully listen to the prompts so that you are directed to the right person  All voicemails are confidential   Bobby Alvarado all requests for admission clinical reviews, approved or denied determinations and any other requests to dedicated fax number below belonging to the campus where the patient is receiving treatment   List of dedicated fax numbers for the Facilities:  [address] DENIALS (Administrative/Medical Necessity) [phone]   [address] (Maternity/NICU/Pediatrics) [phone]   [address]  [address] [address] [facility] [place] [address] [address] [place] [address] [address] [facility] [place] [address] [phone]

## 2022-07-15 NOTE — PHYSICAL THERAPY NOTE
PHYSICAL THERAPY CANCELLATION NOTE    Patient Name: Supriya PLAZA Date: 7/15/2022       07/15/22 3590   Note Type   Note type Cancelled Session   Cancel Reasons Patient off floor/test   Additional Comments PT orders received, chart review performed  Spoke to [facility] attempted to see patient for PT evaluation, but patient hading off floor for EGD at attempted time   Will follow up to perform PT evaluation during this admission as appropriate and schedule allows     Andriy Hdz, PT, DPT

## 2022-07-16 PROBLEM — K25.9 PREPYLORIC ULCER: Status: ACTIVE | Noted: 2021-05-11

## 2022-07-16 PROBLEM — R26.2 AMBULATORY DYSFUNCTION: Status: ACTIVE | Noted: 2022-07-16

## 2022-07-16 LAB
GLUCOSE SERPL-MCNC: 123 MG/DL (ref 65–140)
GLUCOSE SERPL-MCNC: 138 MG/DL (ref 65–140)
GLUCOSE SERPL-MCNC: 149 MG/DL (ref 65–140)
GLUCOSE SERPL-MCNC: 150 MG/DL (ref 65–140)

## 2022-07-16 PROCEDURE — 82948 REAGENT STRIP/BLOOD GLUCOSE: CPT

## 2022-07-16 PROCEDURE — 99232 SBSQ HOSP IP/OBS MODERATE 35: CPT | Performed by: INTERNAL MEDICINE

## 2022-07-16 PROCEDURE — C9113 INJ PANTOPRAZOLE SODIUM, VIA: HCPCS | Performed by: INTERNAL MEDICINE

## 2022-07-16 PROCEDURE — 97163 PT EVAL HIGH COMPLEX 45 MIN: CPT

## 2022-07-16 PROCEDURE — 97166 OT EVAL MOD COMPLEX 45 MIN: CPT

## 2022-07-16 PROCEDURE — 93005 ELECTROCARDIOGRAM TRACING: CPT

## 2022-07-16 RX ORDER — FAMOTIDINE 20 MG/1
20 TABLET, FILM COATED ORAL DAILY
Status: DISCONTINUED | OUTPATIENT
Start: 2022-07-16 | End: 2022-07-16

## 2022-07-16 RX ORDER — FAMOTIDINE 20 MG/1
20 TABLET, FILM COATED ORAL 2 TIMES DAILY
Status: DISCONTINUED | OUTPATIENT
Start: 2022-07-16 | End: 2022-07-16

## 2022-07-16 RX ORDER — FAMOTIDINE 20 MG/1
20 TABLET, FILM COATED ORAL EVERY 12 HOURS
Status: DISCONTINUED | OUTPATIENT
Start: 2022-07-17 | End: 2022-07-18 | Stop reason: HOSPADM

## 2022-07-16 RX ORDER — CHOLESTYRAMINE LIGHT 4 G/5.7G
4 POWDER, FOR SUSPENSION ORAL 2 TIMES DAILY PRN
Refills: 0 | Status: DISCONTINUED | OUTPATIENT
Start: 2022-07-16 | End: 2022-07-18

## 2022-07-16 RX ORDER — GUAIFENESIN 600 MG/1
600 TABLET, EXTENDED RELEASE ORAL EVERY 12 HOURS SCHEDULED
Status: DISCONTINUED | OUTPATIENT
Start: 2022-07-16 | End: 2022-07-18 | Stop reason: HOSPADM

## 2022-07-16 RX ORDER — OXYCODONE HYDROCHLORIDE AND ACETAMINOPHEN 5; 325 MG/1; MG/1
1 TABLET ORAL EVERY 6 HOURS PRN
Refills: 0 | Status: DISCONTINUED | OUTPATIENT
Start: 2022-07-16 | End: 2022-07-18 | Stop reason: HOSPADM

## 2022-07-16 RX ORDER — SUCRALFATE 1 G/1
1 TABLET ORAL EVERY 6 HOURS SCHEDULED
Status: DISCONTINUED | OUTPATIENT
Start: 2022-07-16 | End: 2022-07-18 | Stop reason: HOSPADM

## 2022-07-16 RX ADMIN — LISINOPRIL 20 MG: 20 TABLET ORAL at 09:14

## 2022-07-16 RX ADMIN — ATORVASTATIN CALCIUM 40 MG: 40 TABLET, FILM COATED ORAL at 17:03

## 2022-07-16 RX ADMIN — GABAPENTIN 300 MG: 300 CAPSULE ORAL at 09:14

## 2022-07-16 RX ADMIN — PANTOPRAZOLE SODIUM 40 MG: 40 INJECTION, POWDER, FOR SOLUTION INTRAVENOUS at 09:13

## 2022-07-16 RX ADMIN — ALUMINA, MAGNESIA, AND SIMETHICONE ORAL SUSPENSION REGULAR STRENGTH 30 ML: 1200; 1200; 120 SUSPENSION ORAL at 06:04

## 2022-07-16 RX ADMIN — PANTOPRAZOLE SODIUM 40 MG: 40 INJECTION, POWDER, FOR SOLUTION INTRAVENOUS at 20:58

## 2022-07-16 RX ADMIN — SODIUM CHLORIDE, POTASSIUM CHLORIDE, SODIUM LACTATE AND CALCIUM CHLORIDE 75 ML/HR: 600; 310; 30; 20 INJECTION, SOLUTION INTRAVENOUS at 21:10

## 2022-07-16 RX ADMIN — SUCRALFATE 1 G: 1 TABLET ORAL at 17:03

## 2022-07-16 RX ADMIN — TRIMETHOBENZAMIDE HYDROCHLORIDE 200 MG: 100 INJECTION INTRAMUSCULAR at 09:59

## 2022-07-16 RX ADMIN — SUCRALFATE 1 G: 1 TABLET ORAL at 23:17

## 2022-07-16 RX ADMIN — GABAPENTIN 300 MG: 300 CAPSULE ORAL at 17:03

## 2022-07-16 RX ADMIN — Medication 12.5 MG: at 09:14

## 2022-07-16 RX ADMIN — SODIUM CHLORIDE, POTASSIUM CHLORIDE, SODIUM LACTATE AND CALCIUM CHLORIDE 75 ML/HR: 600; 310; 30; 20 INJECTION, SOLUTION INTRAVENOUS at 06:05

## 2022-07-16 RX ADMIN — LATANOPROST 1 DROP: 50 SOLUTION OPHTHALMIC at 20:59

## 2022-07-16 RX ADMIN — GUAIFENESIN 600 MG: 600 TABLET ORAL at 20:58

## 2022-07-16 RX ADMIN — INSULIN LISPRO 1 UNITS: 100 INJECTION, SOLUTION INTRAVENOUS; SUBCUTANEOUS at 17:04

## 2022-07-16 RX ADMIN — MAGNESIUM OXIDE TAB 400 MG (241.3 MG ELEMENTAL MG) 400 MG: 400 (241.3 MG) TAB at 09:14

## 2022-07-16 RX ADMIN — ZOLPIDEM TARTRATE 5 MG: 5 TABLET ORAL at 21:09

## 2022-07-16 RX ADMIN — MAGNESIUM OXIDE TAB 400 MG (241.3 MG ELEMENTAL MG) 400 MG: 400 (241.3 MG) TAB at 17:03

## 2022-07-16 RX ADMIN — Medication 12.5 MG: at 20:58

## 2022-07-16 RX ADMIN — ESCITALOPRAM OXALATE 10 MG: 10 TABLET ORAL at 09:14

## 2022-07-16 RX ADMIN — ENOXAPARIN SODIUM 40 MG: 40 INJECTION SUBCUTANEOUS at 09:14

## 2022-07-16 RX ADMIN — LEVOTHYROXINE SODIUM 37.5 MCG: 75 TABLET ORAL at 06:05

## 2022-07-16 RX ADMIN — CHOLESTYRAMINE 4 G: 4 POWDER, FOR SUSPENSION ORAL at 09:59

## 2022-07-16 NOTE — PLAN OF CARE
Problem: PHYSICAL THERAPY ADULT  Goal: Performs mobility at highest level of function for planned discharge setting  See evaluation for individualized goals  Description: Treatment/Interventions: Functional transfer training, LE strengthening/ROM, Therapeutic exercise, Endurance training, Patient/family training, Bed mobility, Equipment eval/education, Gait training, Elevations  Equipment Recommended: Walker       See flowsheet documentation for full assessment, interventions and recommendations  Note: Prognosis: Fair  Problem List: Decreased strength, Decreased endurance, Impaired balance, Decreased mobility, Decreased safety awareness  Assessment: Pt is a 76 y o  female seen for PT evaluation s/p admit to [address] [place] [address] on 7/14/2022 w/ Intractable nausea and vomiting  Order placed for PT  Comorbidities affecting pt's physical performance at time of assessment include: DM and HTN  Personal factors affecting pt at time of IE include: steps to enter environment and limited home support  Prior to admission, pt was was independent w/ all functional mobility w/ RW or SPC as needed, lived in one floor environment, had 2-3 ELLYN ? railing, and lived with son   Upon evaluation: Pt requires min A for sit to stand and min A for ambulation with RW  (Please find full objective findings from PT assessment regarding body systems outlined above)  Impairments and limitations also listed above, especially due to  weakness, impaired balance, decreased endurance, gait deviations, decreased activity tolerance, decreased safety awareness, and fall risk  Pt's clinical presentation is currently unstable/unpredictable seen in pt's presentation of significant decline in functional mobility compared to baseline, fall risk, and poor insight into deficits  Pt to benefit from continued skilled PT tx while in hospital and upon DC to address deficits as defined above and maximize level of functional mobility   From PT/mobility standpoint, recommendation at time of d/c would be inpatient rehab vs  Home PT pending progress  Recommend  progression of ambulation and initiation of HEP as appropriate   PT Discharge Recommendation: Post acute rehabilitation services (vs  HHPT pending progress)    See flowsheet documentation for full assessment

## 2022-07-16 NOTE — ASSESSMENT & PLAN NOTE
· Continues to be nauseous but not throwing up  · EGD showed peptic stricture and pre-pyloric ulcer  · Continue with PPI b i d , Carafate, Pepcid and antiemetics  · Continue with IV hydration  · Monitor electrolytes and replete as needed  · Advance diet as tolerated    · GI on board

## 2022-07-16 NOTE — PROGRESS NOTES
Progress Note - Sol Painter 76 y o  female MRN: [id number]    Unit/Bed#: S -01 Encounter: [id number]    Assessment and Plan:     1  Nausea, vomiting with upper abdominal pain  2  Gastric ulcer  Patient is status post EGD yesterday showing peptic strictures of the esophagus as well as a single, large crated around ulcer in the pre-pyloric region  She reports some abdominal discomfort but no pain  Her biggest symptom is feeling mucus coming up her esophagus  CT scan and lab work on arrival was unremarkable      -continue PPI b i d   -continue liquid Carafate   -will add Pepcid to take 2 times a day for refractory reflux symptoms   -discussed with primary team   Consider addition of Mucinex     -could consider trying Bentyl if abdominal cramping occurs or loose stools persists  -patient will need repeat EGD in 3 months to assess for healing of ulcer   -follow-up biopsy results    ----------------------------------------------------------------------------------------------------------------    Subjective:     Patient reports she is not doing well this morning  She continues to report feeling as though mucus is coming up her throat  She has abdominal discomfort but no pain  Reports 3 bowel movements last night  Objective:     Vitals: Blood pressure 150/72, pulse 64, temperature 98 6 °F (37 °C), resp  rate 22, SpO2 96 %, not currently breastfeeding  ,There is no height or weight on file to calculate BMI  No intake or output data in the 24 hours ending 07/16/22 1130    Physical Exam:     General Appearance:  Sitting comfortably in chair  Does not appear in distress  Lungs: Clear to auscultation bilaterally, no rales or rhonchi, no labored breathing/accessory muscle use  Heart: Regular rate and rhythm, S1, S2 normal, no murmur, click, rub or gallop  Abdomen: + soft, nontender throughout  Bowel sounds present    Extremities: No cyanosis, clubbing, or edema    Invasive Devices  Report    Peripheral Intravenous Line  Duration           Peripheral IV 07/14/22 Proximal;Right;Ventral (anterior) Antecubital 1 day                Lab Results:  Results from last 7 days   Lab Units 07/14/22  1330   WBC Thousand/uL 8 06   HEMOGLOBIN g/dL 11 7   HEMATOCRIT % 37 6   PLATELETS Thousands/uL 329   NEUTROS PCT % 71   LYMPHS PCT % 18   MONOS PCT % 8   EOS PCT % 1     Results from last 7 days   Lab Units 07/14/22  1330   POTASSIUM mmol/L 4 4   CHLORIDE mmol/L 105   CO2 mmol/L 30   BUN mg/dL 22   CREATININE mg/dL 1 07   CALCIUM mg/dL 9 1   ALK PHOS U/L 98   ALT U/L 11   AST U/L 17     Invalid input(s): BILI      Results from last 7 days   Lab Units 07/14/22  1330   LIPASE u/L 65       Imaging Studies: I have personally reviewed pertinent imaging studies  EGD    Addendum Date: 7/15/2022    [address] [address] [phone] DATE OF SERVICE: 7/15/22 PHYSICIAN(S): Attending: Lois Miller MD Fellow: No Staff Documented INDICATION: PUD (peptic ulcer disease), Intractable nausea and vomiting POST-OP DIAGNOSIS: See the impression below  PREPROCEDURE: Informed consent was obtained for the procedure, including sedation  Risks of perforation, hemorrhage, adverse drug reaction and aspiration were discussed  The patient was placed in the left lateral decubitus position  Patient was explained about the risks and benefits of the procedure  Risks including but not limited to bleeding, infection, and perforation were explained in detail  Also explained about less than 100% sensitivity with the exam and other alternatives  DETAILS OF PROCEDURE: Patient was taken to the procedure room where a time out was performed to confirm correct patient and correct procedure   The patient underwent monitored anesthesia care, which was administered by an anesthesia professional  The patient's blood pressure, heart rate, level of consciousness, respirations and oxygen were monitored throughout the procedure  The scope was advanced to the second part of the duodenum  Retroflexion was performed in the fundus  The patient experienced no blood loss  The procedure was not difficult  The patient tolerated the procedure well  There were no apparent complications  ANESTHESIA INFORMATION: ASA: III Anesthesia Type: IV Sedation with Anesthesia MEDICATIONS: EPINEPHrine (ADRENALIN) injection 0 2 mg (Totals for administrations occurring from 1357 to 1419 on 07/15/22) FINDINGS: Peptic stricture (traversable) in the middle third of the esophagus, lower third of the esophagus and GE junction; performed cold forceps biopsy  3 peptic strictures with normal intervening mucosa noted in the esophagus  Single large, cratered, round ulcer in the prepyloric region with flat pigmented spot (Harjit IIC); performed cold forceps biopsy The duodenal bulb and 2nd part of the duodenum appeared normal  SPECIMENS: ID Type Source Tests Collected by Time Destination 1 : Gastric bx Tissue Stomach TISSUE EXAM Liudmila Meyer MD 7/15/2022  2:13 PM  2 : esophagus bx Tissue Esophagus TISSUE EXAM Liudmila Meyer MD 7/15/2022  2:15 PM  IMPRESSION: Peptic stricture (traversable) in the middle third of the esophagus, lower third of the esophagus and GE junction; performed cold forceps biopsy  3 peptic strictures with normal intervening mucosa noted in the esophagus   Single large, cratered, round ulcer in the prepyloric region with flat pigmented spot (Harjit IIC); performed cold forceps biopsy The duodenal bulb and 2nd part of the duodenum appeared normal  RECOMMENDATION: Await pathology results Follow up biopsies IV PPI BID, then transition to PO PPI BID Liquid carafate Continue antiemetics Clear liquid diet Monitor hgb, transfuse if needed Return to floor Repeat EGD in 3 months  Liudmila Meyer MD     Result Date: 7/15/2022  Impression: Peptic stricture (traversable) in the middle third of the esophagus, lower third of the esophagus and GE junction; performed cold forceps biopsy  3 peptic strictures with normal intervening mucosa noted in the esophagus  Single large, cratered, round ulcer in the prepyloric region with flat pigmented spot (Harjit IIC); performed cold forceps biopsy The duodenal bulb and 2nd part of the duodenum appeared normal  RECOMMENDATION: Await pathology results Follow up biopsies IV PPI BID, then transition to PO PPI BID Liquid carafate Continue antiemetics Clear liquid diet Monitor hgb, transfuse if needed Return to floor  Krystyan Larose MD     CT abdomen pelvis wo contrast    Result Date: 7/14/2022  Impression: No acute intra-abdominal finding  Workstation performed: [id number]     XR chest portable    Result Date: 7/15/2022  Impression: No acute cardiopulmonary disease   Workstation performed: [id number]

## 2022-07-16 NOTE — CASE MANAGEMENT
Case Management Discharge Planning Note    Patient name Donia Lennox  Location S /S -01 MRN [id number]  : 1945 Date 2022       Current Admission Date: 2022  Current Admission Diagnosis:Intractable nausea and vomiting   Patient Active Problem List    Diagnosis Date Noted    Intractable nausea and vomiting 2022    Hypomagnesemia 2022    Anemia 2022    Sepsis ([facility] ) 2022    Prolonged Q-T interval on ECG 2022    Pneumobilia 2022    Stroke-like symptoms 2022    Hypoalbuminemia due to protein-calorie malnutrition ([facility] ) 2021    Obesity, morbid ([facility] ) 2021    Right lower quadrant abdominal pain 2021    PUD (peptic ulcer disease) 2021    Diarrhea 2021    Elevated troponin 2020    Right knee pain 2020    Melena 07/15/2020    Mild intermittent asthma without complication     S/P insertion of spinal cord stimulator 2018    Iron deficiency anemia secondary to inadequate dietary iron intake 2018    Failed back surgical syndrome 2018    Status post lumbar spinal fusion 2018    Pain in right wrist 2018    Hypothyroidism 2017    Degenerative lumbar spinal stenosis 2017    Glaucoma 2017    Overactive bladder 2016    Dyspepsia 2016    High cholesterol 2016    Type 2 diabetes mellitus ([facility] ) 2015    Anxiety 2015    Chronic pain disorder 2013    Hypertension 2013    Postlaminectomy syndrome, lumbar 2012      LOS (days): 2  Geometric Mean LOS (GMLOS) (days): 2 50  Days to GMLOS:0 4     OBJECTIVE:  Risk of Unplanned Readmission Score: 19         Current admission status: Inpatient   Preferred Pharmacy:   [address]  Phone: [phone] Fax: [phone]    [address] [facility] - [address] [facility]  [address] [facility] [address]  Phone: [phone] Fax: [phone]    [address] [address]  Phone: [phone] Fax: [phone]    [facility] [address] [address]  Phone: [phone] Fax: [phone]    Primary Care Provider: NANY Stover    Primary Insurance: Matt Nevarez [address] [facility] REP  Secondary Insurance:     DISCHARGE DETAILS:    Discharge planning discussed with[de-identified] Patient  Freedom of Choice: Yes  Comments - Freedom of Choice: FOC discussed regarding therapy recs for IP rehab  Pt refusing at this time  Pt also refusing HHC, as she recently had [facility] and still has the papers of exercises for her to do herself at home  Contacts  Patient Contacts: Patient  Contact Method:  In Person  Reason/Outcome: Continuity of Care, Discharge [address]         Is the patient interested in [facility] at discharge?: No    Other Referral/Resources/Interventions Provided:  Interventions: HHC, None Indicated    Treatment Team Recommendation: Short Term Rehab  Discharge Destination Plan[de-identified] Home

## 2022-07-16 NOTE — PLAN OF CARE
Problem: OCCUPATIONAL THERAPY ADULT  Goal: Performs self-care activities at highest level of function for planned discharge setting  See evaluation for individualized goals  Description: Treatment Interventions: ADL retraining, Functional transfer training, Endurance training, Patient/family training, Neuromuscular reeducation, Compensatory technique education, Continued evaluation, Activityengagement  Equipment Recommended: Shower/Tub chair with back ($)       See flowsheet documentation for full assessment, interventions and recommendations  Note: Limitation: Decreased ADL status, Decreased Safe judgement during ADL, Decreased endurance, Decreased self-care trans, Decreased high-level ADLs  Prognosis: Fair  Assessment: Patient evaluated by Occupational Therapy  Patient admitted with Intractable nausea and vomiting  The patients occupational profile, medical and therapy history includes a expanded review of medical and/or therapy records and additional review of physical, cognitive, or psychosocial history related to current functional performance  Comorbidities affecting functional mobility and ADLS include: anemia, anxiety, diabetes and hypertension  Prior to admission, patient was independent with functional mobility with RW and cane, independent with ADLS, independent with IADLS and living with son  in a 1 level home with 2-3 steps to enter  Patient performed grooming and UB bath at Sup , UB dressing Sup, LB dressing Min A , transfer Min A and functional ambulation Min A  The evaluation identifies the following performance deficits: weakness, impaired balance, decreased endurance, decreased coordination, increased fall risk, new onset of impairment of functional mobility, decreased ADLS, decreased IADLS, decreased activity tolerance and decreased safety awareness, that result in activity limitations and/or participation restrictions   This evaluation requires clinical decision making of moderate complexity, because the patient may present with comorbidities that affect occupational performance and required minimal or moderate modification of tasks or assistance with the consideration of several treatment options  The Barthel Index was used as a functional outcome tool presenting with a score of Barthel Index Score: 65,   The patient's raw score on the -PAC Daily Activity inpatient short form is 19, standardized score is 40 22, greater than 39 4  Patients at this level are likely to benefit from DC to home  Pecommendation for DC is HHOT vs Post acute rehab pending progress with balance and functional ambulation safety  Patient will benefit from skilled Occupational Therapy services to address above deficits and facilitate a safe return to prior level of function       OT Discharge Recommendation:  (Home OT Vs post acute pending progress with balance and ambulation)

## 2022-07-16 NOTE — ASSESSMENT & PLAN NOTE
Lab Results   Component Value Date    HGBA1C 8 6 (H) 04/22/2022       Recent Labs     07/15/22  1603 07/15/22  2121 07/16/22  0741 07/16/22  1119   POCGLU 125 164* 123 138       Blood Sugar Average: Last 72 hrs:  (P) 132 [id number] currently takes metformin at home, last A1c was 8 6  Will hold metformin and give sliding scale insulin Accu-Cheks

## 2022-07-16 NOTE — PLAN OF CARE
Problem: Potential for Falls  Goal: Patient will remain free of falls  Description: INTERVENTIONS:  - Educate patient/family on patient safety including physical limitations  - Instruct patient to call for assistance with activity   - Consult OT/PT to assist with strengthening/mobility   - Keep Call bell within reach  - Keep bed low and locked with side rails adjusted as appropriate  - Keep care items and personal belongings within reach  - Initiate and maintain comfort rounds  - Make Fall Risk Sign visible to staff  - Offer Toileting every  Hours, in advance of need  - Initiate/Maintain alarm  - Obtain necessary fall risk management equipment:   - Apply yellow socks and bracelet for high fall risk patients  - Consider moving patient to room near nurses station  Outcome: Progressing     Problem: PAIN - ADULT  Goal: Verbalizes/displays adequate comfort level or baseline comfort level  Description: Interventions:  - Encourage patient to monitor pain and request assistance  - Assess pain using appropriate pain scale  - Administer analgesics based on type and severity of pain and evaluate response  - Implement non-pharmacological measures as appropriate and evaluate response  - Consider cultural and social influences on pain and pain management  - Notify physician/advanced practitioner if interventions unsuccessful or patient reports new pain  Outcome: Progressing     Problem: DISCHARGE PLANNING  Goal: Discharge to home or other facility with appropriate resources  Description: INTERVENTIONS:  - Identify barriers to discharge w/patient and caregiver  - Arrange for needed discharge resources and transportation as appropriate  - Identify discharge learning needs (meds, wound care, etc )  - Arrange for interpretive services to assist at discharge as needed  - Refer to Case Management Department for coordinating discharge planning if the patient needs post-hospital services based on physician/advanced practitioner order or complex needs related to functional status, cognitive ability, or social support system  Outcome: Progressing     Problem: GASTROINTESTINAL - ADULT  Goal: Minimal or absence of nausea and/or vomiting  Description: INTERVENTIONS:  - Administer IV fluids if ordered to ensure adequate hydration  - Maintain NPO status until nausea and vomiting are resolved  - Nasogastric tube if ordered  - Administer ordered antiemetic medications as needed  - Provide nonpharmacologic comfort measures as appropriate  - Advance diet as tolerated, if ordered  - Consider nutrition services referral to assist patient with adequate nutrition and appropriate food choices  Outcome: Progressing     Problem: SKIN/TISSUE INTEGRITY - ADULT  Goal: Skin Integrity remains intact(Skin Breakdown Prevention)  Description: Assess:  -Perform Jack assessment every  -Clean and moisturize skin every   -Inspect skin when repositioning, toileting, and assisting with ADLS  -Assess under medical devices such as  every   -Assess extremities for adequate circulation and sensation     Bed Management:  -Have minimal linens on bed & keep smooth, unwrinkled  -Change linens as needed when moist or perspiring  -Avoid sitting or lying in one position for more than  hours while in bed  -Keep HOB at degrees     Toileting:  -Offer bedside commode  -Assess for incontinence every   -Use incontinent care products after each incontinent episode such     Activity:  -Mobilize patient  times a day  -Encourage activity and walks on unit  -Encourage or provide ROM exercises   -Turn and reposition patient every  Hours  -Use appropriate equipment to lift or move patient in bed  -Instruct/ Assist with weight shifting every  when out of bed in chair  -Consider limitation of chair time  hour intervals    Skin Care:  -Avoid use of baby powder, tape, friction and shearing, hot water or constrictive clothing  -Relieve pressure over bony prominences using   -Do not massage red bony areas    Next Steps:  -Teach patient strategies to minimize risks such as    -Consider consults to  interdisciplinary teams such as   Outcome: Progressing

## 2022-07-16 NOTE — PHYSICAL THERAPY NOTE
PHYSICAL THERAPY EVALUATION  NAME: Baylee Varela  AGE:   76 y o  MRN:  [id number]  ADMIT DX: Dehydration [E86 0]    PMH:   Past Medical History:   Diagnosis Date    Acid reflux     Anemia     hx of iron-deficient    Anxiety     Arthritis     Asthma     last needed inhaler last year 2020    Basal cell carcinoma     upper lip    Chronic narcotic dependence (HCC)     Chronic pain     Colon polyp     Cystocele     Diabetes mellitus ([address] )     stable    Disease of thyroid gland     hypothyroidism    Diverticulosis     Dysfunctional uterine bleeding     last assessed - [id number]    Fibromyalgia     Gastric ulcer     Gastroparesis     History of colonic polyps     last assessed - [id number]    History of gastroesophageal reflux (GERD)     Hypercholesterolemia     Hyperlipidemia     Hypertension     IBS (irritable bowel syndrome)     Post laminectomy syndrome     Seasonal allergies     Spinal stenosis      LENGTH OF STAY: 2       07/16/22 1141   PT Last Visit   PT Visit Date 07/16/22   Note Type   Note type Evaluation   Pain Assessment   Pain Assessment Tool 0-10   Pain Score No Pain  (discomfort; nausea)   Hospital Pain Intervention(s) Repositioned; Ambulation/increased activity   Restrictions/Precautions   Weight Bearing Precautions Per Order No   Other Precautions Chair Alarm; Bed Alarm;Multiple lines; Fall Risk   Home Living   Type of [facility] One level;Stairs to enter with rails  (first floor apartment with 2-3 ELLYN)   Bathroom Shower/Tub Tub/shower unit   Home Equipment Walker;Cane  (rollator)   Additional Comments Ambulates with mod I and chooses AD as needed  Prior Function   Level of Dresser Independent with ADLs and functional mobility   Lives With Son  (has MS; occasionally uses RW also-unable to assist with ADL)   Receives Help From Family; Neighbor  (neighbor assists with driving; pt reports occasional help from daughters also)   ADL Assistance Independent   IADLs Needs assistance  ((-) )   Falls in the last 6 months 0   General   Family/Caregiver Present No   Cognition   Overall Cognitive Status WFL   Arousal/Participation Cooperative   Orientation Level Oriented X4   Memory Within functional limits   Following Commands Follows one step commands without difficulty   Comments Pt identified by name and   Subjective   Subjective Agrees to PT evaluation and is pleasant and cooperative throughout session  "I feel a little better now that I moved around "   RLE Assessment   RLE Assessment X   Strength RLE   RLE Overall Strength 4-/5  (functionally)   LLE Assessment   LLE Assessment X   Strength LLE   LLE Overall Strength 4-/5  (functionally)   Bed Mobility   Supine to Sit Unable to assess  (OOB in chair pre/post session)   Transfers   Sit to Stand 4  Minimal assistance   Additional items Assist x 1; Increased time required;Verbal cues;Armrests   Stand to Sit 4  Minimal assistance   Additional items Assist x 1; Armrests; Increased time required;Verbal cues   Ambulation/Elevation   Gait pattern Forward Flexion;Decreased foot clearance; Short stride; Excessively slow   Gait Assistance 4  Minimal assist   Additional items Assist x 1;Verbal cues   Assistive Device Rolling walker   Distance 20` x2   Balance   Static Sitting Fair +   Dynamic Sitting Fair   Static Standing Fair -  ((+) sway during static stance at sink)   Dynamic Standing Poor +   Ambulatory Poor +  (with RW)   Endurance Deficit   Endurance Deficit Yes   Endurance Deficit Description limited ambulation distance, fatigue   Activity Tolerance   Activity Tolerance Patient limited by fatigue   Nurse Made Aware Per RN, pt appropriate to evaluate   Assessment   Prognosis Fair   Problem List Decreased strength;Decreased endurance; Impaired balance;Decreased mobility; Decreased safety awareness   Goals   Patient Goals to go home   STG Expiration Date 22   Short Term Goal #1 Pt will be able to: (1) perform bed mobility with supervision to promote OOB activity (2) perform sit to stand with supervision to decrease burden of care (3) ambulate at least 200` with supervision and least restrictive AD to increase activity tolerance (4) increase standing balance by 1 grade to decrease risk of falls (5) negotiate at least 2-3 stairs with supervision and use of handrail to allow safe access into apartment   PT Treatment Day 0   Plan   Treatment/Interventions Functional transfer training;LE strengthening/ROM; Therapeutic exercise; Endurance training;Patient/family training;Bed mobility; Equipment eval/education;Gait training;Elevations   PT Frequency 3-5x/wk   Recommendation   PT Discharge Recommendation Post acute rehabilitation services  (vs  HHPT pending progress)   Equipment Recommended [facility] Recommended Wheeled walker   [facility] Basic Mobility Inpatient   Turning in Bed Without Bedrails 3   Lying on Back to Sitting on Edge of Flat Bed 3   Moving Bed to Chair 3   Standing Up From Chair 3   Walk in Room 3   Climb 3-5 Stairs 2   Basic Mobility Inpatient Raw Score 17   Basic Mobility Standardized Score 39 67   Highest Level Of Mobility   -[facility] Goal 5: Stand one or more mins   -HLM Achieved 7: Walk 25 feet or more   End of Consult   Patient Position at End of Consult Bedside chair;Bed/Chair alarm activated; All needs within reach   The patient's -[facility] Basic Mobility Inpatient Short Form Raw Score is 17, Standardized Score is 39 67  A standardized score less than 40 78 or Raw Score of 16 suggests the patient may benefit from discharge to post-acute rehabilitation services, which DOES coincide with CURRENT above PT recommendations  However please refer to therapist recommendation for discharge planning given other factors that may influence destination  Adapted from [employer] [facility] 6-Clicks Basic Mobility and Daily Activity Scores With Discharge Destination   Physical Therapy, 2021;101:1-9  DOI: 10 [id number]      Pt was seen for a co-eval with OT due to potential need for significant physical assist, poor pain control, impaired mental status, limiting behaviors, and poor adherence to precautions  Assessment: Pt is a 76 y o  female seen for PT evaluation s/p admit to [address] [place] [address] on 7/14/2022 w/ Intractable nausea and vomiting  Order placed for PT  Comorbidities affecting pt's physical performance at time of assessment include: DM and HTN  Personal factors affecting pt at time of IE include: steps to enter environment and limited home support  Prior to admission, pt was was independent w/ all functional mobility w/ RW or SPC as needed, lived in one floor environment, had 2-3 ELLYN ? railing, and lived with son   Upon evaluation: Pt requires min A for sit to stand and min A for ambulation with RW  (Please find full objective findings from PT assessment regarding body systems outlined above)  Impairments and limitations also listed above, especially due to  weakness, impaired balance, decreased endurance, gait deviations, decreased activity tolerance, decreased safety awareness, and fall risk  Pt's clinical presentation is currently unstable/unpredictable seen in pt's presentation of significant decline in functional mobility compared to baseline, fall risk, and poor insight into deficits  Pt to benefit from continued skilled PT tx while in hospital and upon DC to address deficits as defined above and maximize level of functional mobility  From PT/mobility standpoint, recommendation at time of d/c would be inpatient rehab vs  Home PT pending progress  Recommend  progression of ambulation and initiation of HEP as appropriate         Steffany Grimes, PT,DPT

## 2022-07-16 NOTE — OCCUPATIONAL THERAPY NOTE
Occupational Therapy Evaluation     Patient Name: Sanjay Valdes  [id number]'[id number] Date: 7/16/2022  Problem List  Principal Problem:    Intractable nausea and vomiting  Active Problems:    Type 2 diabetes mellitus (HCC)    Chronic pain disorder    Hypertension    PUD (peptic ulcer disease)    Past Medical History  Past Medical History:   Diagnosis Date    Acid reflux     Anemia     hx of iron-deficient    Anxiety     Arthritis     Asthma     last needed inhaler last year 2020    Basal cell carcinoma     upper lip    Chronic narcotic dependence ([facility] [address] )     Chronic pain     Colon polyp     Cystocele     Diabetes mellitus ([facility] [address] )     stable    Disease of thyroid gland     hypothyroidism    Diverticulosis     Dysfunctional uterine bleeding     last assessed - [id number]    Fibromyalgia     Gastric ulcer     Gastroparesis     History of colonic polyps     last assessed - [id number]    History of gastroesophageal reflux (GERD)     Hypercholesterolemia     Hyperlipidemia     Hypertension     IBS (irritable bowel syndrome)     Post laminectomy syndrome     Seasonal allergies     Spinal stenosis      Past Surgical History  Past Surgical History:   Procedure Laterality Date    APPENDECTOMY      BACK SURGERY      BREAST CYST EXCISION Left     CHOLECYSTECTOMY      COLONOSCOPY      ESOPHAGOGASTRODUODENOSCOPY N/A 09/28/2016    Procedure: ESOPHAGOGASTRODUODENOSCOPY (EGD); Surgeon: Jessy Fu MD;  [facility]: [facility];   Service:     HERNIA REPAIR      HYSTERECTOMY      TTAH-BSO age 25    LAMINECTOMY      LUMBAR LAMINECTOMY      OOPHORECTOMY      age 25    WY ARTHRODESIS POSTERIOR/POSTEROLATERAL THORACIC N/A 06/04/2018    Procedure: Reopening of lumbar incision for T12-L5 posterior instrumented fixation and fusion and T12-L4 posterior decompression;  Surgeon: Jose Luis Del Rosario MD;  [facility];  Service: Neurosurgery    WY COLONOSCOPY FLX DX W/COLLJ SPEC WHEN PFRMD N/A 03/02/2016    Procedure: EGD AND COLONOSCOPY;  Surgeon: Sandra Perez Kimmy Brian MD;  [facility]: [facility]; Service: Gastroenterology    CT DILATE ESOPHAGUS N/A 09/28/2016    Procedure: DILATATION ESOPHAGEAL;  Surgeon: Nori Burt MD;  [facility]; Service: Gastroenterology    CT ESOPHAGOGASTRODUODENOSCOPY TRANSORAL DIAGNOSTIC N/A 07/18/2016    Procedure: ESOPHAGOGASTRODUODENOSCOPY (EGD); Surgeon: Nori Burt MD;  [facility]; Service: Gastroenterology    CT IMPLANT SPINAL NEUROSTIM/ Left 06/04/2018    Procedure: removal of left buttock implantable pulse generator and placement of new  implantable pulse generator;  Surgeon: Yair Zuniga MD;  [facility];  Service: Neurosurgery         07/16/22 1140   Note Type   Note type Evaluation   Restrictions/Precautions   Weight Bearing Precautions Per Order No   Other Precautions Chair Alarm; Bed Alarm; Fall Risk   Pain Assessment   Pain Assessment Tool 0-10   Pain Score No Pain   Home Living   Type of Home Apartment  (2-3 ELLYN)   Home Layout One level   Bathroom Shower/Tub Tub/shower unit   Bathroom Toilet Standard   Home Equipment Walker;Cane   Additional Comments Per patient uses a walker and cane when ambulating  If she feels week she uses the walker to ambulate   Prior Function   Level of Freder[place] Independent with ADLs and functional mobility   Lives With Son   Receives Help From Family   ADL Assistance Independent   IADLs Independent   Falls in the last 6 months 0   Comments Patient + drives  per patient has son to assit when DC  She also has daughter that live nearby to assist if needed   ADL   Eating Assistance 7  Independent   Grooming Assistance 5  Supervision/Setup   Grooming Deficit Wash/dry hands; Wash/dry face;Standing with assistive device; Increased time to complete;Supervision/safety   UB Bathing Assistance 5  Supervision/Setup   UB Bathing Deficit Increased time to complete;Right arm;Left arm;Supervision/safety   UB Dressing Assistance 5  Supervision/Setup   LB Dressing Assistance 4  Minimal Assistance   Toileting Assistance  5  Supervision/Setup   Additional Comments patient required CGA when standing at sink due to unsteady dynmic balance   Bed Mobility   Additional Comments unable to assess due to received in recliner   Transfers   Sit to Stand 4  Minimal assistance   Additional items Assist x 1; Increased time required;Verbal cues   Stand to Sit 4  Minimal assistance   Additional items Assist x 1; Increased time required;Verbal cues   Functional Mobility   Additional Comments patient ambulated with RW 20 feet at [address]   Static Sitting Fair +   Dynamic Sitting Fair +   Static Standing Fair -   Dynamic Standing Poor +   Activity Tolerance   Activity Tolerance Patient tolerated treatment well   Nurse Made Aware RN   RUE Assessment   RUE Assessment WFL   LUE Assessment   LUE Assessment WFL   Cognition   Overall Cognitive Status WFL   Arousal/Participation Alert; Cooperative   Attention Within functional limits   Orientation Level Oriented X4   Following Commands Follows one step commands with increased time or repetition   Comments Pt ID by wristband name and    Assessment   Limitation Decreased ADL status; Decreased Safe judgement during ADL;Decreased endurance;Decreased self-care trans;Decreased high-level ADLs   Prognosis Fair   Assessment Patient evaluated by Occupational Therapy  Patient admitted with Intractable nausea and vomiting  The patients occupational profile, medical and therapy history includes a expanded review of medical and/or therapy records and additional review of physical, cognitive, or psychosocial history related to current functional performance  Comorbidities affecting functional mobility and ADLS include: anemia, anxiety, diabetes and hypertension  Prior to admission, patient was independent with functional mobility with RW and cane, independent with ADLS, independent with IADLS and living with son  in a 1 level home with 2-3 steps to enter   Patient performed grooming and UB bath at Sup , UB dressing Sup, LB dressing Min A , transfer Min A and functional ambulation Min A  The evaluation identifies the following performance deficits: weakness, impaired balance, decreased endurance, decreased coordination, increased fall risk, new onset of impairment of functional mobility, decreased ADLS, decreased IADLS, decreased activity tolerance and decreased safety awareness, that result in activity limitations and/or participation restrictions  This evaluation requires clinical decision making of moderate complexity, because the patient may present with comorbidities that affect occupational performance and required minimal or moderate modification of tasks or assistance with the consideration of several treatment options  The Barthel Index was used as a functional outcome tool presenting with a score of Barthel Index Score: 65,   The patient's raw score on the AM-PAC Daily Activity inpatient short form is 19, standardized score is 40 22, greater than 39 4  Patients at this level are likely to benefit from DC to home  Pecommendation for DC is HHOT vs Post acute rehab pending progress with balance and functional ambulation safety  Patient will benefit from skilled Occupational Therapy services to address above deficits and facilitate a safe return to prior level of function  Goals   Patient Goals "go home   LTG Time Frame   (8-14)   Long Term Goal #1 Goals established to promote Patient Goals: to go home:  Patient will increase standing tolerance to 5 minutes during ADL task to decrease assistance level and decrease fall risk; Patient will increase bed mobility to supervision in preparation for ADLS and transfers;  Patient will increase functional mobility to and from bathroom with rolling walker with supervision to increase performance with ADLS and to use a toilet; Patient will tolerate 10 minutes of UE ROM/strengthening to increase general activity tolerance and performance in ADLS/IADLS; Patient will improve functional activity tolerance to 10 minutes of sustained functional tasks to increase participation in basic self-care and decrease assistance level;    Patient will increase dynamic standing balance to fair+ to improve postural stability and decrease fall risk during standing ADLS and transfers   Functional Transfer Goals   Pt Will Transfer To Bedside Commode With mod indep   Pt Will Transfer To Toilet With mod indep   Pt Will Transfer To Shower With mod indep   ADL Goals   Pt Will Perform Eating Independently   Pt Will Perform Grooming With mod indep   Pt Will Perform Bathing With mod indep   Pt Will Perform UE Dressing With mod indep   Pt Will Perform LE Dressing With mod indep   Pt Will Perform Toileting With mod indep   Plan   Treatment Interventions ADL retraining;Functional transfer training; Endurance training;Patient/family training;Neuromuscular reeducation; Compensatory technique education;Continued evaluation; Activityengagement   Goal Expiration Date 07/30/22   OT Frequency 2-3x/wk   Recommendation   OT Discharge Recommendation   (Home OT Vs post acute pending progress with balance and functional ambulation safety)   Equipment Recommended Shower/Tub chair with back ($)   AM-PAC Daily Activity Inpatient   Lower Body Dressing 3   Bathing 3   Toileting 3   Upper Body Dressing 3   Grooming 3   Eating 4   Daily Activity Raw Score 19   Daily Activity Standardized Score (Calc for Raw Score >=11) 40 22   AM-PAC Applied Cognition Inpatient   Following a Speech/Presentation 4   Understanding Ordinary Conversation 4   Taking Medications 4   Remembering Where Things Are Placed or Put Away 4   Remembering List of 4-5 Errands 3   Taking Care of Complicated Tasks 3   Applied Cognition Raw Score 22   Applied Cognition Standardized Score 47 83   Barthel Index   Feeding 10   Bathing 0   Grooming Score 0   Dressing Score 5   Bladder Score 5   Bowels Score 10   Toilet Use Score 10   Transfers (Bed/Chair) Score 10   Mobility (Level Surface) Score 10   Stairs Score 5   Barthel Index Score 65   Todd Ny, OT

## 2022-07-16 NOTE — PROGRESS NOTES
[facility]  Progress Note - Darshana Alexander 1945, 76 y o  female MRN: [id number]  Unit/Bed#: S -01 Encounter: [id number]  Primary Care Provider: NANY Lai   Date and time admitted to hospital: 7/14/2022 12:24 PM    PUD (peptic ulcer disease)  Assessment & Plan  Continue PPI, Carafate and Pepcid  * Intractable nausea and vomiting  Assessment & Plan  · Continues to be nauseous but not throwing up  · EGD showed peptic stricture and pre-pyloric ulcer  · Continue with PPI b i d , Carafate, Pepcid and antiemetics  · Continue with IV hydration  · Monitor electrolytes and replete as needed  · Advance diet as tolerated  · GI on board       Ambulatory dysfunction  Assessment & Plan  · Evauated by PT and recommended STR vs HHPT pending progress  · Discussed with Dr Jesu Saldaña , patient's daughter who prefers patient's to go home when stable for discharge  Hypertension  Assessment & Plan  Continue on metoprolol and lisinopril  Chronic pain disorder  Assessment & Plan  · Patient takes Percocet at home as needed once or twice a day  Type 2 diabetes mellitus [facility])  Assessment & Plan  Lab Results   Component Value Date    HGBA1C 8 6 (H) 04/22/2022       Recent Labs     07/15/22  1603 07/15/22  2121 07/16/22  0741 07/16/22  1119   POCGLU 125 164* 123 138       Blood Sugar Average: Last 72 hrs:  (P) 132 [id number] currently takes metformin at home, last A1c was 8 6  Will hold metformin and give sliding scale insulin Accu-Cheks  VTE Pharmacologic Prophylaxis:   VTE Score: 4 Moderate Risk (Score 3-4) - Pharmacological DVT Prophylaxis Ordered: Enoxaparin (Lovenox)  Mechanical VTE Prophylaxis in Place: Yes    Patient Centered Rounds: I have performed bedside rounds with nursing staff today      Discussions with Specialists or Other Care Team Provider:  GI    Education and Discussions with Family / Patient: Updated  (daughter) via phone     Current Length of Stay: 2 day(s)    Current Patient Status: Inpatient     Discharge Plan / Estimated Discharge Date: Anticipate discharge in 48 hrs to home  Code Status: Level 1 - Full Code      Subjective:   No acute events  She states that she is not feeling very well today  Continues to be nauseous but not throwing up  She did mention some episode of diarrhea for which her home dose Questran was given  She denies chest pain or shortness of breath  Objective:     Vitals:   Temp (24hrs), Av 3 °F (36 8 °C), Min:98 1 °F (36 7 °C), Max:98 6 °F (37 °C)    Temp:  [98 1 °F (36 7 °C)-98 6 °F (37 °C)] 98 1 °F (36 7 °C)  HR:  [56-75] 67  Resp:  [17] 17  BP: (126-156)/(55-72) 156/71  SpO2:  [92 %-96 %] 95 %  There is no height or weight on file to calculate BMI  Input and Output Summary (last 24 hours):     No intake or output data in the 24 hours ending 22 1518    Physical Exam:     Physical Exam  Vitals and nursing note reviewed  Constitutional:       General: She is not in acute distress  Appearance: She is well-developed  HENT:      Head: Normocephalic and atraumatic  Eyes:      Conjunctiva/sclera: Conjunctivae normal    Cardiovascular:      Rate and Rhythm: Normal rate and regular rhythm  Heart sounds: No murmur heard  Pulmonary:      Effort: Pulmonary effort is normal  No respiratory distress  Breath sounds: Normal breath sounds  Abdominal:      General: Bowel sounds are normal       Palpations: Abdomen is soft  Tenderness: There is no abdominal tenderness  There is no guarding or rebound  Musculoskeletal:      Cervical back: Neck supple  Right lower leg: No edema  Left lower leg: No edema  Skin:     General: Skin is warm and dry  Neurological:      General: No focal deficit present  Mental Status: She is alert and oriented to person, place, and time     Psychiatric:         Mood and Affect: Mood normal          Behavior: Behavior normal           Additional Data:     Labs:  Results from last 7 days   Lab Units 07/14/22  1330   WBC Thousand/uL 8 06   HEMOGLOBIN g/dL 11 7   HEMATOCRIT % 37 6   PLATELETS Thousands/uL 329   NEUTROS PCT % 71   LYMPHS PCT % 18   MONOS PCT % 8   EOS PCT % 1     Results from last 7 days   Lab Units 07/14/22  1330   SODIUM mmol/L 141   POTASSIUM mmol/L 4 4   CHLORIDE mmol/L 105   CO2 mmol/L 30   BUN mg/dL 22   CREATININE mg/dL 1 07   ANION GAP mmol/L 6   CALCIUM mg/dL 9 1   ALBUMIN g/dL 3 7   TOTAL BILIRUBIN mg/dL 0 47   ALK PHOS U/L 98   ALT U/L 11   AST U/L 17   GLUCOSE RANDOM mg/dL 131         Results from last 7 days   Lab Units 07/16/22  1119 07/16/22  0741 07/15/22  2121 07/15/22  1603 07/15/22  1110 07/15/22  0740 07/14/22  2219 07/14/22  1654 07/14/22  1414   POC GLUCOSE mg/dl 138 123 164* 125 140 109 122 129 140         Results from last 7 days   Lab Units 07/14/22  1330   LACTIC ACID mmol/L 0 9       Imaging: Reviewed radiology reports from this admission including: procedure reports    Recent Cultures (last 7 days):           Lines/Drains:  Invasive Devices  Report    Peripheral Intravenous Line  Duration           Peripheral IV 07/14/22 Proximal;Right;Ventral (anterior) Antecubital 2 days                Telemetry:        Last 24 Hours Medication List:   Current Facility-Administered Medications   Medication Dose Route Frequency Provider Last Rate    acetaminophen  650 mg Oral Q6H PRN Orestes Chang MD      albuterol  2 puff Inhalation Q6H PRN Orestes Chang MD      aluminum-magnesium hydroxide-simethicone  30 mL Oral Q6H PRN Orestes Chang MD      atorvastatin  40 mg Oral Daily With Patricia Ruiz MD      cholestyramine sugar free  4 g Oral BID PRN Reese Peña MD      enoxaparin  40 mg Subcutaneous Daily Reese Peña MD      escitalopram  10 mg Oral Daily Orestes Chang MD      famotidine  20 mg Oral Daily Reese Peña MD      gabapentin  300 mg Oral BID Collette Valentina Yip MD      insulin lispro  1-6 Units Subcutaneous TID AC Nikita MD Danisha      insulin lispro  1-6 Units Subcutaneous HS Nikita MD Danisha      lactated ringers  75 mL/hr Intravenous Continuous Reese Peña MD 75 mL/hr (07/16/22 0605)    latanoprost  1 drop Both Eyes HS Collette Valentina Yip MD      levothyroxine  37 5 mcg Oral Early Morning Nikita Danisha, MD      lisinopril  20 mg Oral Daily Nikita Danisha, MD      magnesium oxide  400 mg Oral BID Nikita Danisha, MD      metoprolol tartrate  12 5 mg Oral Q12H [address] Nikita MD Danisha      Milnacipran HCl  1 tablet Oral Daily Nikita MD Danisha      oxyCODONE-acetaminophen  1 tablet Oral Q6H PRN Reese Peña MD      pantoprazole  40 mg Intravenous Q12H [address] [facility], MD      sucralfate  1 g Oral Q6H [address] Reese Peña MD      trimethobenzamide  200 mg Intramuscular Q6H PRN Nikita MD Danisha      zolpidem  5 mg Oral HS PRN Nikita MD Danisha          Today, Patient Was Seen By: Augustina Montano MD    ** Please Note: This note has been constructed using a voice recognition system   **

## 2022-07-16 NOTE — ASSESSMENT & PLAN NOTE
· Evauated by PT and recommended STR vs HHPT pending progress  · Discussed with Dr Jeremy Sherwood , patient's daughter who prefers patient's to go home when stable for discharge

## 2022-07-17 LAB
ANION GAP SERPL CALCULATED.3IONS-SCNC: 6 MMOL/L (ref 4–13)
BUN SERPL-MCNC: 11 MG/DL (ref 5–25)
CALCIUM SERPL-MCNC: 8.5 MG/DL (ref 8.4–10.2)
CHLORIDE SERPL-SCNC: 107 MMOL/L (ref 96–108)
CO2 SERPL-SCNC: 29 MMOL/L (ref 21–32)
CREAT SERPL-MCNC: 0.92 MG/DL (ref 0.6–1.3)
ERYTHROCYTE [DISTWIDTH] IN BLOOD BY AUTOMATED COUNT: 15.8 % (ref 11.6–15.1)
GFR SERPL CREATININE-BSD FRML MDRD: 59 ML/MIN/1.73SQ M
GLUCOSE SERPL-MCNC: 102 MG/DL (ref 65–140)
GLUCOSE SERPL-MCNC: 135 MG/DL (ref 65–140)
GLUCOSE SERPL-MCNC: 156 MG/DL (ref 65–140)
GLUCOSE SERPL-MCNC: 171 MG/DL (ref 65–140)
GLUCOSE SERPL-MCNC: 186 MG/DL (ref 65–140)
HCT VFR BLD AUTO: 34.3 % (ref 34.8–46.1)
HGB BLD-MCNC: 10.4 G/DL (ref 11.5–15.4)
MAGNESIUM SERPL-MCNC: 2.1 MG/DL (ref 1.9–2.7)
MCH RBC QN AUTO: 26.9 PG (ref 26.8–34.3)
MCHC RBC AUTO-ENTMCNC: 30.3 G/DL (ref 31.4–37.4)
MCV RBC AUTO: 89 FL (ref 82–98)
PLATELET # BLD AUTO: 285 THOUSANDS/UL (ref 149–390)
PMV BLD AUTO: 10.9 FL (ref 8.9–12.7)
POTASSIUM SERPL-SCNC: 3.8 MMOL/L (ref 3.5–5.3)
RBC # BLD AUTO: 3.86 MILLION/UL (ref 3.81–5.12)
SODIUM SERPL-SCNC: 142 MMOL/L (ref 135–147)
WBC # BLD AUTO: 6.8 THOUSAND/UL (ref 4.31–10.16)

## 2022-07-17 PROCEDURE — 80048 BASIC METABOLIC PNL TOTAL CA: CPT | Performed by: INTERNAL MEDICINE

## 2022-07-17 PROCEDURE — 83735 ASSAY OF MAGNESIUM: CPT | Performed by: INTERNAL MEDICINE

## 2022-07-17 PROCEDURE — 99232 SBSQ HOSP IP/OBS MODERATE 35: CPT | Performed by: INTERNAL MEDICINE

## 2022-07-17 PROCEDURE — 85027 COMPLETE CBC AUTOMATED: CPT | Performed by: INTERNAL MEDICINE

## 2022-07-17 PROCEDURE — C9113 INJ PANTOPRAZOLE SODIUM, VIA: HCPCS | Performed by: INTERNAL MEDICINE

## 2022-07-17 PROCEDURE — 82948 REAGENT STRIP/BLOOD GLUCOSE: CPT

## 2022-07-17 RX ADMIN — GUAIFENESIN 600 MG: 600 TABLET ORAL at 08:20

## 2022-07-17 RX ADMIN — ZOLPIDEM TARTRATE 5 MG: 5 TABLET ORAL at 21:16

## 2022-07-17 RX ADMIN — CHOLESTYRAMINE 4 G: 4 POWDER, FOR SUSPENSION ORAL at 08:21

## 2022-07-17 RX ADMIN — LATANOPROST 1 DROP: 50 SOLUTION OPHTHALMIC at 21:17

## 2022-07-17 RX ADMIN — ATORVASTATIN CALCIUM 40 MG: 40 TABLET, FILM COATED ORAL at 15:50

## 2022-07-17 RX ADMIN — TRIMETHOBENZAMIDE HYDROCHLORIDE 200 MG: 100 INJECTION INTRAMUSCULAR at 15:42

## 2022-07-17 RX ADMIN — SUCRALFATE 1 G: 1 TABLET ORAL at 04:56

## 2022-07-17 RX ADMIN — OXYCODONE HYDROCHLORIDE AND ACETAMINOPHEN 1 TABLET: 5; 325 TABLET ORAL at 18:56

## 2022-07-17 RX ADMIN — FAMOTIDINE 20 MG: 20 TABLET ORAL at 04:55

## 2022-07-17 RX ADMIN — SUCRALFATE 1 G: 1 TABLET ORAL at 17:05

## 2022-07-17 RX ADMIN — INSULIN LISPRO 1 UNITS: 100 INJECTION, SOLUTION INTRAVENOUS; SUBCUTANEOUS at 15:50

## 2022-07-17 RX ADMIN — Medication 12.5 MG: at 08:20

## 2022-07-17 RX ADMIN — Medication 12.5 MG: at 21:16

## 2022-07-17 RX ADMIN — ENOXAPARIN SODIUM 40 MG: 40 INJECTION SUBCUTANEOUS at 08:19

## 2022-07-17 RX ADMIN — SODIUM CHLORIDE, POTASSIUM CHLORIDE, SODIUM LACTATE AND CALCIUM CHLORIDE 75 ML/HR: 600; 310; 30; 20 INJECTION, SOLUTION INTRAVENOUS at 11:17

## 2022-07-17 RX ADMIN — SUCRALFATE 1 G: 1 TABLET ORAL at 11:21

## 2022-07-17 RX ADMIN — INSULIN LISPRO 1 UNITS: 100 INJECTION, SOLUTION INTRAVENOUS; SUBCUTANEOUS at 12:23

## 2022-07-17 RX ADMIN — GABAPENTIN 300 MG: 300 CAPSULE ORAL at 08:20

## 2022-07-17 RX ADMIN — MAGNESIUM OXIDE TAB 400 MG (241.3 MG ELEMENTAL MG) 400 MG: 400 (241.3 MG) TAB at 17:05

## 2022-07-17 RX ADMIN — ESCITALOPRAM OXALATE 10 MG: 10 TABLET ORAL at 08:19

## 2022-07-17 RX ADMIN — GUAIFENESIN 600 MG: 600 TABLET ORAL at 21:16

## 2022-07-17 RX ADMIN — PANTOPRAZOLE SODIUM 40 MG: 40 INJECTION, POWDER, FOR SOLUTION INTRAVENOUS at 08:20

## 2022-07-17 RX ADMIN — LISINOPRIL 20 MG: 20 TABLET ORAL at 08:19

## 2022-07-17 RX ADMIN — GABAPENTIN 300 MG: 300 CAPSULE ORAL at 17:06

## 2022-07-17 RX ADMIN — INSULIN LISPRO 1 UNITS: 100 INJECTION, SOLUTION INTRAVENOUS; SUBCUTANEOUS at 21:17

## 2022-07-17 RX ADMIN — FAMOTIDINE 20 MG: 20 TABLET ORAL at 15:50

## 2022-07-17 RX ADMIN — MAGNESIUM OXIDE TAB 400 MG (241.3 MG ELEMENTAL MG) 400 MG: 400 (241.3 MG) TAB at 08:19

## 2022-07-17 RX ADMIN — CHOLESTYRAMINE 4 G: 4 POWDER, FOR SUSPENSION ORAL at 21:16

## 2022-07-17 RX ADMIN — LEVOTHYROXINE SODIUM 37.5 MCG: 75 TABLET ORAL at 04:55

## 2022-07-17 RX ADMIN — OXYCODONE HYDROCHLORIDE AND ACETAMINOPHEN 1 TABLET: 5; 325 TABLET ORAL at 09:19

## 2022-07-17 RX ADMIN — PANTOPRAZOLE SODIUM 40 MG: 40 INJECTION, POWDER, FOR SOLUTION INTRAVENOUS at 21:16

## 2022-07-17 NOTE — NURSING NOTE
Patient got up and walked the halls with nurse  Standby assist with the rolling walker   Patient walked [id number] ft

## 2022-07-17 NOTE — ASSESSMENT & PLAN NOTE
Lab Results   Component Value Date    HGBA1C 8 6 (H) 04/22/2022       Recent Labs     07/16/22  1555 07/16/22  2143 07/17/22  0714 07/17/22  1109   POCGLU 150* 149* 135 171*       Blood Sugar Average: Last 72 hrs:  (P) 797 [id number] currently takes metformin at home, last A1c was 8 6  Will hold metformin and give sliding scale insulin Accu-Cheks  Adjust treatment accordingly

## 2022-07-17 NOTE — PROGRESS NOTES
Assessment/Plan:     Diagnoses and all orders for this visit:    Nausea and vomiting, unspecified vomiting type  -     Discontinue: trimethobenzamide (TIGAN) IM injection 200 mg    Acute nonintractable headache, unspecified headache type  -     ketorolac (TORADOL) injection 30 mg  -     ondansetron (ZOFRAN) 4 mg/2 mL injection; Inject 2 mL (4 mg total) into a muscle 1 (one) time for 1 dose  #1  Nausea and vomiting  Discussed with patient plan to administer ondansetron to manage the nausea and vomiting  Discussed with patient and her daughter possible need to go to the hospital if nausea and vomiting continues due to potential dehydration   Will attempt to move GI appointment from 08/02/2022 to earlier next week  #2 Acute non-intractable headache   Discussed with patient plan to administer low dose of Toradol to manage the headache   Patient instructed to call if no improvement in 72 hours or symptoms worsen    Subjective:      Patient ID: Kaci Parra is a 76 y o  female  76 y  o female presenting with twenty-four hours of nausea and vomiting and inability to keep anything down  Her eldest daughter brought her to the appointment  She reports that she has been vomiting up mostly mucous  She states that she drinks small amounts of fluid and shortly after she vomits  She had a steak dinner last night and since that time has not been able to eat or drink          Family History   Problem Relation Age of Onset   Zita Pall Lung cancer Mother 55    Pulmonary embolism Father     No Known Problems Sister     No Known Problems Daughter     No Known Problems Daughter     Stroke Maternal Grandmother     Heart attack Maternal Grandfather     No Known Problems Paternal Grandmother     No Known Problems Paternal Grandfather     No Known Problems Maternal Aunt     Diabetes Family         Diabetes mellitus    Hypertension Family     Stroke Family         Stroke complications     Social History     Socioeconomic History    Marital status:      Spouse name: Not on file    Number of children: Not on file    Years of education: Not on file    Highest education level: Not on file   Occupational History    Occupation: Retired   Tobacco Use    Smoking status: Former Smoker     Types: Cigarettes     Quit date: 1970     Years since quittin 5    Smokeless tobacco: Never Used    Tobacco comment: Denied history of current ever day smoker, Former smoker and Never smoker all documented in [employer]   Vaping Use    Vaping Use: Never used   Substance and Sexual Activity    Alcohol use: Not Currently     Comment: Denied history of alcohol use    Drug use: No     Comment: Denied history of drug use    Sexual activity: Not Currently   Other Topics Concern    Not on file   Social History Narrative    Marital History - Currently  [facility] [place]     Social Determinants of Health     Financial Resource Strain: Not on file   Food Insecurity: No Food Insecurity    Worried About Running Out of Food in the Last Year: Never true    [address] in the Last Year: Never true   Transportation Needs: No Transportation Needs    Lack of Transportation (Medical): No    Lack of Transportation (Non-Medical):  No   Physical Activity: Not on file   Stress: Not on file   Social Connections: Not on file   Intimate Partner Violence: Not on file   Housing Stability: Low Risk     Unable to Pay for Housing in the Last Year: No    Number of Places Lived in the Last Year: 1    Unstable Housing in the Last Year: No     E-Cigarette/Vaping    E-Cigarette Use Never User      E-Cigarette/Vaping Substances    Nicotine No     THC No     CBD No     Flavoring No     Other No     Unknown No      Past Medical History:   Diagnosis Date    Acid reflux     Anemia     hx of iron-deficient    Anxiety     Arthritis     Asthma     last needed inhaler last 2020    Basal cell carcinoma     upper lip    Chronic narcotic dependence ([facility] [address] [facility]  Chronic pain     Colon polyp     Cystocele     Diabetes mellitus ([facility] [address] )     stable    Disease of thyroid gland     hypothyroidism    Diverticulosis     Dysfunctional uterine bleeding     last assessed - [id number]    Fibromyalgia     Gastric ulcer     Gastroparesis     History of colonic polyps     last assessed - [id number]    History of gastroesophageal reflux (GERD)     Hypercholesterolemia     Hyperlipidemia     Hypertension     IBS (irritable bowel syndrome)     Post laminectomy syndrome     Seasonal allergies     Spinal stenosis      Past Surgical History:   Procedure Laterality Date    APPENDECTOMY      BACK SURGERY      BREAST CYST EXCISION Left     CHOLECYSTECTOMY      COLONOSCOPY      ESOPHAGOGASTRODUODENOSCOPY N/A 09/28/2016    Procedure: ESOPHAGOGASTRODUODENOSCOPY (EGD); Surgeon: Alexsandra Reyes MD;  [facility]; Service:     HERNIA REPAIR      HYSTERECTOMY      TTAH-BSO age 25   Daniela Diones LAMINECTOMY      LUMBAR LAMINECTOMY      OOPHORECTOMY      age 25   Daniela Diones [place] ARTHRODESIS POSTERIOR/POSTEROLATERAL THORACIC N/A 06/04/2018    Procedure: Reopening of lumbar incision for T12-L5 posterior instrumented fixation and fusion and T12-L4 posterior decompression;  Surgeon: Patsy Cortez MD;  [facility];  Service: Neurosurgery    NJ COLONOSCOPY FLX DX W/COLLJ SPEC WHEN PFRMD N/A 03/02/2016    Procedure: EGD AND COLONOSCOPY;  Surgeon: Alexsandra Reyes MD;  [facility]; Service: Gastroenterology    NJ DILATE ESOPHAGUS N/A 09/28/2016    Procedure: DILATATION ESOPHAGEAL;  Surgeon: Alexsandra Reyes MD;  [facility]; Service: Gastroenterology    NJ ESOPHAGOGASTRODUODENOSCOPY TRANSORAL DIAGNOSTIC N/A 07/18/2016    Procedure: ESOPHAGOGASTRODUODENOSCOPY (EGD); Surgeon: Alexsandra Reyes MD;  [facility];   Service: Gastroenterology    NJ IMPLANT SPINAL NEUROSTIM/ Left 06/04/2018    Procedure: removal of left buttock implantable pulse generator and placement of new  implantable pulse generator;  Surgeon: Blayne Bond MD;  [facility];  Service: Neurosurgery     Allergies   Allergen Reactions    Penicillins Anaphylaxis, Hives and Other (See Comments)     Other reaction(s): Unknown Reaction    Sulfa Antibiotics Anaphylaxis and Other (See Comments)     Other reaction(s): Unknown Reaction    Aspartame - Food Allergy Other (See Comments) and Hypertension     Slurred speech, weakness, stroke sx    Iodinated Diagnostic Agents Hives     Pt has taken prep prior for contrast and has not had any break through reaction    Keflex [Cephalexin] Hives    Levaquin [Levofloxacin] Rash     No current facility-administered medications for this visit  No current outpatient medications on file      Facility-Administered Medications Ordered in Other Visits:     acetaminophen (TYLENOL) tablet 650 mg, 650 mg, Oral, Q6H PRN, Maria Kelly MD, 650 mg at 07/14/22 2026    albuterol (PROVENTIL HFA,VENTOLIN HFA) inhaler 2 puff, 2 puff, Inhalation, Q6H PRN, Maria Kelly MD    aluminum-magnesium hydroxide-simethicone (MYLANTA) oral suspension 30 mL, 30 mL, Oral, Q6H PRN, Maria Kelly MD, 30 mL at 07/16/22 0604    atorvastatin (LIPITOR) tablet 40 mg, 40 mg, Oral, Daily With Joselito Rich MD, 40 mg at 07/16/22 1703    cholestyramine sugar free (QUESTRAN LIGHT) packet 4 g, 4 g, Oral, BID PRN, Christine Damon MD, 4 g at 07/17/22 0821    enoxaparin (LOVENOX) subcutaneous injection 40 mg, 40 mg, Subcutaneous, Daily, Reese Peña MD, 40 mg at 07/17/22 0819    escitalopram (LEXAPRO) tablet 10 mg, 10 mg, Oral, Daily, Maria Kelly MD, 10 mg at 07/17/22 0819    famotidine (PEPCID) tablet 20 mg, 20 mg, Oral, Q12H, Reese Peña MD, 20 mg at 07/17/22 0455    gabapentin (NEURONTIN) capsule 300 mg, 300 mg, Oral, BID, Maria Kelly MD, 300 mg at 07/17/22 0820    guaiFENesin (MUCINEX) 12 hr tablet 600 mg, 600 mg, Oral, Q12H [address], Benja Pruett MD Ole, 600 mg at 07/17/22 0820    insulin lispro (HumaLOG) 100 units/mL subcutaneous injection 1-6 Units, 1-6 Units, Subcutaneous, TID AC, 1 Units at 07/16/22 1704 **AND** Fingerstick Glucose (POCT), , , TID AC, Collette Srivastava MD    insulin lispro (HumaLOG) 100 units/mL subcutaneous injection 1-6 Units, 1-6 Units, Subcutaneous, HS, Maria Kelly MD, 1 Units at 07/15/22 2135    lactated ringers infusion, 75 mL/hr, Intravenous, Continuous, Reese ePña MD, Last Rate: 75 mL/hr at 07/17/22 1117, 75 mL/hr at 07/17/22 1117    latanoprost (XALATAN) 0 005 % ophthalmic solution 1 drop, 1 drop, Both Eyes, HS, Maria Klely MD, 1 drop at 07/16/22 2059    levothyroxine tablet 37 5 mcg, 37 5 mcg, Oral, Early Morning, Maria Kelly MD, 37 5 mcg at 07/17/22 0455    lisinopril (ZESTRIL) tablet 20 mg, 20 mg, Oral, Daily, Maria Kelly MD, 20 mg at 07/17/22 0819    magnesium oxide (MAG-OX) tablet 400 mg, 400 mg, Oral, BID, Maria Kelly MD, 400 mg at 07/17/22 0819    metoprolol tartrate (LOPRESSOR) partial tablet 12 5 mg, 12 5 mg, Oral, Q12H [address], Maria Kelly MD, 12 5 mg at 07/17/22 0820    Milnacipran HCl TABS 100 mg, 1 tablet, Oral, Daily, Maria Kelly MD    oxyCODONE-acetaminophen (PERCOCET) 5-325 mg per tablet 1 tablet, 1 tablet, Oral, Q6H PRN, Reese Peña MD, 1 tablet at 07/17/22 0919    pantoprazole (PROTONIX) injection 40 mg, 40 mg, Intravenous, Q12H [address], Maria Kelly MD, 40 mg at 07/17/22 0820    sucralfate (CARAFATE) tablet 1 g, 1 g, Oral, Q6H [address], Reese Peña MD, 1 g at 07/17/22 1121    trimethobenzamide (TIGAN) IM injection 200 mg, 200 mg, Intramuscular, Q6H PRN, Maria Kelly MD, 200 mg at 07/16/22 0959    zolpidem (AMBIEN) tablet 5 mg, 5 mg, Oral, HS PRN, Maria Kelly MD, 5 mg at 07/16/22 2105    Review of Systems   Constitutional: Negative  Respiratory: Negative  Cardiovascular: Negative      Gastrointestinal: Positive for abdominal pain, nausea and vomiting  Negative for abdominal distention, constipation and diarrhea  Genitourinary: Negative  Skin: Negative  Neurological: Negative  Psychiatric/Behavioral: Negative  Objective:    /76   Pulse 82   Temp 98 5 °F (36 9 °C)   Ht 5' (1 524 m)   Wt 79 4 kg (175 lb)   LMP  (LMP Unknown)   BMI 34 18 kg/m² (Reviewed)     Physical Exam  Vitals reviewed  Constitutional:       General: She is not in acute distress  Appearance: She is well-developed and well-groomed  She is not ill-appearing  HENT:      Head: Normocephalic and atraumatic  Eyes:      General: Lids are normal       Extraocular Movements: Extraocular movements intact  Conjunctiva/sclera: Conjunctivae normal       Pupils: Pupils are equal, round, and reactive to light  Neck:      Thyroid: No thyromegaly or thyroid tenderness  Trachea: Trachea and phonation normal    Cardiovascular:      Rate and Rhythm: Normal rate and regular rhythm  Pulses: Normal pulses  Heart sounds: Normal heart sounds  Pulmonary:      Effort: Pulmonary effort is normal       Breath sounds: Normal breath sounds  Abdominal:      General: Bowel sounds are normal  There is no distension  Palpations: Abdomen is soft  Tenderness: There is abdominal tenderness  Skin:     General: Skin is warm and dry  Capillary Refill: Capillary refill takes less than 2 seconds  Neurological:      General: No focal deficit present  Mental Status: She is alert and oriented to person, place, and time  Psychiatric:         Mood and Affect: Mood normal          Behavior: Behavior normal  Behavior is cooperative  Thought Content:  Thought content normal

## 2022-07-17 NOTE — ASSESSMENT & PLAN NOTE
· Evauated by PT and recommended STR vs HHPT pending progress  · Discussed with Dr Ned Morin , patient's daughter who prefers patient's to go home when stable for discharge  Discussed also with the patient, who prefers to go home with home health services

## 2022-07-17 NOTE — ASSESSMENT & PLAN NOTE
· Continues to be nauseous but not throwing up; also having occasional abdominal pains  · EGD showed peptic stricture and pre-pyloric ulcer  · Continue with PPI b i d , Carafate, Pepcid and antiemetics  · On IV fluids; I may discontinue today, as patient told me, although she has been nauseous, no vomiting episodes and able to tolerate fluids and her diet  · Monitor electrolytes and replete as needed  · Advance diet as tolerated  · GI on board   · As per GI, they recommending possibility of giving the patient Bentyl, however, my discussion with the patient, she has history of significant glaucoma, and Bentyl is contraindicated as this may worsen her glaucoma

## 2022-07-17 NOTE — QUICK NOTE
Patient's nurse informed me, that patient was walked in the halls, and eventually developed nausea and dizziness and that she noticed a mild left facial droop  She told me, that she did a neuro exam and no other neurologic deficits  Thus, I went to see the patient immediately  Patient was sitting on her recliner at that time and doing fine, with no more nausea or dizziness  On my neurological examination:  Cranial nerves 2-12 are all intact, except for a very mild left mouth droop; sensory examination were all 100% to light touch in all extremities  Motor examination were all 5/5 in all extremities  We called patient's daughter, Dr Radha Contreras, and we were told, that patient has chronic left-sided facial droop  We also did face time with her for her to be able to look at her mother's face if there is any difference from before  From our discussion, no changes in her face and the left-sided facial droop is at baseline and chronic  Vital signs are stable  No further workup done at this time, aside from the present management that we are doing  Continue to monitor closely  I spoke to the patient's nurse about this

## 2022-07-17 NOTE — PROGRESS NOTES
[facility]  Progress Note - Gloria Magdaleno 1945, 76 y o  female MRN: [id number]  Unit/Bed#: S -01 Encounter: [id number]  Primary Care Provider: NANY Chino   Date and time admitted to hospital: 7/14/2022 12:24 PM    Intractable nausea and vomiting  Assessment & Plan  · Continues to be nauseous but not throwing up; also having occasional abdominal pains  · EGD showed peptic stricture and pre-pyloric ulcer  · Continue with PPI b i d , Carafate, Pepcid and antiemetics  · On IV fluids; I may discontinue today, as patient told me, although she has been nauseous, no vomiting episodes and able to tolerate fluids and her diet  · Monitor electrolytes and replete as needed  · Advance diet as tolerated  · GI on board   · As per GI, they recommending possibility of giving the patient Bentyl, however, my discussion with the patient, she has history of significant glaucoma, and Bentyl is contraindicated as this may worsen her glaucoma  Ambulatory dysfunction  Assessment & Plan  · Evauated by PT and recommended STR vs HHPT pending progress  · Discussed with Dr Sterling Lay , patient's daughter who prefers patient's to go home when stable for discharge  Discussed also with the patient, who prefers to go home with home health services  Anemia  Assessment & Plan  · From my review, patient likely has chronic anemia  · Stable  · No active bleeding  · Monitor  · For further evaluation and management if this worsens significantly  Hypertension  Assessment & Plan  Continue on metoprolol and lisinopril  Chronic pain disorder  Assessment & Plan  · Patient takes Percocet at home as needed once or twice a day        Type 2 diabetes mellitus [facility])  Assessment & Plan  Lab Results   Component Value Date    HGBA1C 8 6 (H) 04/22/2022       Recent Labs     07/16/22  1555 07/16/22  2143 07/17/22  0714 07/17/22  1109   POCGLU 150* 149* 135 171*       Blood Sugar Average: Last 72 hrs:  (P) 068 [id number] currently takes metformin at home, last A1c was 8 6  Will hold metformin and give sliding scale insulin Accu-Cheks  Adjust treatment accordingly  * Prepyloric ulcer  Assessment & Plan  Continue PPI, Carafate and Pepcid  VTE Pharmacologic Prophylaxis: VTE Score: 4 Moderate Risk (Score 3-4) - Pharmacological DVT Prophylaxis Ordered: enoxaparin (Lovenox)  Patient Centered Rounds: I performed bedside rounds with nursing staff today  Discussions with Specialists or Other Care Team Provider:     Education and Discussions with Family / Patient: Updated  (daughter) via phone  Time Spent for Care: 30 minutes  More than 50% of total time spent on counseling and coordination of care as described above  Current Length of Stay: 3 day(s)  Current Patient Status: Inpatient   Certification Statement: The patient will continue to require additional inpatient hospital stay due to Above findings and plans  Discharge Plan: Anticipate discharge tomorrow to home with home services  Code Status: Level 1 - Full Code    Subjective:   Patient still not feeling well today  Patient still having occasional nausea, but no actual vomiting episodes  According to the patient, she is able to eat and drink fluids  Patient also told me, that she has occasional abdominal pains and from her description heartburn symptoms  Patient denies any more diarrhea  She told me, her stools are now soft and not watery anymore or lose  I also confirmed this from the patient's nurse  Otherwise no other pains  No other complaints  Objective:     Vitals:   Temp (24hrs), Av 2 °F (36 8 °C), Min:98 1 °F (36 7 °C), Max:98 2 °F (36 8 °C)    Temp:  [98 1 °F (36 7 °C)-98 2 °F (36 8 °C)] 98 2 °F (36 8 °C)  HR:  [72-79] 79  Resp:  [16] 16  BP: (135-157)/(55-84) 157/84  SpO2:  [92 %-95 %] 95 %  There is no height or weight on file to calculate BMI       Input and Output Summary (last 24 hours): Intake/Output Summary (Last 24 hours) at 7/17/2022 1535  Last data filed at 7/17/2022 1301  Gross per 24 hour   Intake 1240 ml   Output 300 ml   Net 940 ml       Physical Exam:   Physical Exam  Vitals and nursing note reviewed  Constitutional:       General: She is not in acute distress  Appearance: She is not ill-appearing, toxic-appearing or diaphoretic  Cardiovascular:      Rate and Rhythm: Normal rate and regular rhythm  Heart sounds: Normal heart sounds  Pulmonary:      Effort: Pulmonary effort is normal  No respiratory distress  Breath sounds: Normal breath sounds  Abdominal:      General: Bowel sounds are normal  There is no distension  Palpations: Abdomen is soft  Tenderness: There is abdominal tenderness  There is no guarding or rebound  Comments: Mild epigastric tenderness  Musculoskeletal:      Right lower leg: No edema  Left lower leg: No edema  Skin:     General: Skin is warm  Coloration: Skin is not pale  Findings: No erythema or rash  Neurological:      General: No focal deficit present  Mental Status: She is alert  Psychiatric:         Mood and Affect: Mood normal          Behavior: Behavior normal          Thought Content:  Thought content normal             Additional Data:     Labs:  Results from last 7 days   Lab Units 07/17/22  0444 07/14/22  1330   WBC Thousand/uL 6 80 8 06   HEMOGLOBIN g/dL 10 4* 11 7   HEMATOCRIT % 34 3* 37 6   PLATELETS Thousands/uL 285 329   NEUTROS PCT %  --  71   LYMPHS PCT %  --  18   MONOS PCT %  --  8   EOS PCT %  --  1     Results from last 7 days   Lab Units 07/17/22  0444 07/14/22  1330   SODIUM mmol/L 142 141   POTASSIUM mmol/L 3 8 4 4   CHLORIDE mmol/L 107 105   CO2 mmol/L 29 30   BUN mg/dL 11 22   CREATININE mg/dL 0 92 1 07   ANION GAP mmol/L 6 6   CALCIUM mg/dL 8 5 9 1   ALBUMIN g/dL  --  3 7   TOTAL BILIRUBIN mg/dL  --  0 47   ALK PHOS U/L  --  98   ALT U/L  --  11   AST U/L  --  17 GLUCOSE RANDOM mg/dL 102 131         Results from last 7 days   Lab Units 07/17/22  1109 07/17/22  0714 07/16/22  2143 07/16/22  1555 07/16/22  1119 07/16/22  0741 07/15/22  2121 07/15/22  1603 07/15/22  1110 07/15/22  0740 07/14/22  2219 07/14/22  1654   POC GLUCOSE mg/dl 171* 135 149* 150* 138 123 164* 125 140 109 122 129         Results from last 7 days   Lab Units 07/14/22  1330   LACTIC ACID mmol/L 0 9       Lines/Drains:  Invasive Devices  Report    Peripheral Intravenous Line  Duration           Peripheral IV 07/14/22 Proximal;Right;Ventral (anterior) Antecubital 3 days                      Imaging: Reviewed radiology reports from this admission including: chest xray and abdominal/pelvic CT    Recent Cultures (last 7 days):         Last 24 Hours Medication List:   Current Facility-Administered Medications   Medication Dose Route Frequency Provider Last Rate    acetaminophen  650 mg Oral Q6H PRN Juju Spain MD      albuterol  2 puff Inhalation Q6H PRN Juju Spain MD      aluminum-magnesium hydroxide-simethicone  30 mL Oral Q6H PRN Juju Spain MD      atorvastatin  40 mg Oral Daily With Yelitza Hughes MD      cholestyramine sugar free  4 g Oral BID PRN Reese Peña MD      enoxaparin  40 mg Subcutaneous Daily Reese Peña MD      escitalopram  10 mg Oral Daily Juju Spain MD      famotidine  20 mg Oral Q12H Reese Peña MD      gabapentin  300 mg Oral BID Juju Spain MD      guaiFENesin  600 mg Oral Q12H Dalmatinbryant Handy MD      insulin lispro  1-6 Units Subcutaneous TID AC Juju Spain MD      insulin lispro  1-6 Units Subcutaneous HS Juju Spain MD      lactated ringers  75 mL/hr Intravenous Continuous Reese Peña MD 75 mL/hr (07/17/22 1117)    latanoprost  1 drop Both Eyes HS Juju Spain MD      levothyroxine  37 5 mcg Oral Early Morning Juju Spain MD      lisinopril  20 mg Oral Daily Tano Barnes MD      magnesium oxide  400 mg Oral BID Tano Barnes MD      metoprolol tartrate  12 5 mg Oral Q12H [facility] Tano Barnes MD      Milnacipran HCl  1 tablet Oral Daily Tano Barnes MD      oxyCODONE-acetaminophen  1 tablet Oral Q6H PRN Reese Peña MD      pantoprazole  40 mg Intravenous Q12H [facility] Tano Barnes MD      sucralfate  1 g Oral Q6H [facility] Reese Peña MD      trimethobenzamide  200 mg Intramuscular Q6H PRN Tano Barnes MD      zolpidem  5 mg Oral HS PRN Tano Barnes MD          Today, Patient Was Seen By: Julian Wu MD    **Please Note: This note may have been constructed using a voice recognition system  **

## 2022-07-17 NOTE — PLAN OF CARE
Problem: Potential for Falls  Goal: Patient will remain free of falls  Description: INTERVENTIONS:  - Educate patient/family on patient safety including physical limitations  - Instruct patient to call for assistance with activity   - Consult OT/PT to assist with strengthening/mobility   - Keep Call bell within reach  - Keep bed low and locked with side rails adjusted as appropriate  - Keep care items and personal belongings within reach  - Initiate and maintain comfort rounds  - Make Fall Risk Sign visible to staff  - Offer Toileting every  Hours, in advance of need  - Initiate/Maintain alarm  - Obtain necessary fall risk management equipment:   - Apply yellow socks and bracelet for high fall risk patients  - Consider moving patient to room near nurses station  Outcome: Progressing     Problem: PAIN - ADULT  Goal: Verbalizes/displays adequate comfort level or baseline comfort level  Description: Interventions:  - Encourage patient to monitor pain and request assistance  - Assess pain using appropriate pain scale  - Administer analgesics based on type and severity of pain and evaluate response  - Implement non-pharmacological measures as appropriate and evaluate response  - Consider cultural and social influences on pain and pain management  - Notify physician/advanced practitioner if interventions unsuccessful or patient reports new pain  Outcome: Progressing     Problem: DISCHARGE PLANNING  Goal: Discharge to home or other facility with appropriate resources  Description: INTERVENTIONS:  - Identify barriers to discharge w/patient and caregiver  - Arrange for needed discharge resources and transportation as appropriate  - Identify discharge learning needs (meds, wound care, etc )  - Arrange for interpretive services to assist at discharge as needed  - Refer to Case Management Department for coordinating discharge planning if the patient needs post-hospital services based on physician/advanced practitioner order or complex needs related to functional status, cognitive ability, or social support system  Outcome: Progressing     Problem: GASTROINTESTINAL - ADULT  Goal: Minimal or absence of nausea and/or vomiting  Description: INTERVENTIONS:  - Administer IV fluids if ordered to ensure adequate hydration  - Maintain NPO status until nausea and vomiting are resolved  - Nasogastric tube if ordered  - Administer ordered antiemetic medications as needed  - Provide nonpharmacologic comfort measures as appropriate  - Advance diet as tolerated, if ordered  - Consider nutrition services referral to assist patient with adequate nutrition and appropriate food choices  Outcome: Progressing     Problem: SKIN/TISSUE INTEGRITY - ADULT  Goal: Skin Integrity remains intact(Skin Breakdown Prevention)  Description: Assess:  -Perform Jack assessment every   -Clean and moisturize skin every   -Inspect skin when repositioning, toileting, and assisting with ADLS  -Assess under medical devices such as  every   -Assess extremities for adequate circulation and sensation     Bed Management:  -Have minimal linens on bed & keep smooth, unwrinkled  -Change linens as needed when moist or perspiring  -Avoid sitting or lying in one position for more than  hours while in bed  -Keep HOB at degrees     Toileting:  -Offer bedside commode  -Assess for incontinence every   -Use incontinent care products after each incontinent episode such as     Activity:  -Mobilize patient  times a day  -Encourage activity and walks on unit  -Encourage or provide ROM exercises   -Turn and reposition patient every  Hours  -Use appropriate equipment to lift or move patient in bed  -Instruct/ Assist with weight shifting every  when out of bed in chair  -Consider limitation of chair time  hour intervals    Skin Care:  -Avoid use of baby powder, tape, friction and shearing, hot water or constrictive clothing  -Relieve pressure over bony prominences using   -Do not massage red bony areas    Next Steps:  -Teach patient strategies to minimize risks such as    -Consider consults to  interdisciplinary teams such as  Outcome: Progressing     Problem: Prexisting or High Potential for Compromised Skin Integrity  Goal: Skin integrity is maintained or improved  Description: INTERVENTIONS:  - Identify patients at risk for skin breakdown  - Assess and monitor skin integrity  - Assess and monitor nutrition and hydration status  - Monitor labs   - Assess for incontinence   - Turn and reposition patient  - Assist with mobility/ambulation  - Relieve pressure over bony prominences  - Avoid friction and shearing  - Provide appropriate hygiene as needed including keeping skin clean and dry  - Evaluate need for skin moisturizer/barrier cream  - Collaborate with interdisciplinary team   - Patient/family teaching  - Consider wound care consult   Outcome: Progressing     Problem: MOBILITY - ADULT  Goal: Maintain or return to baseline ADL function  Description: INTERVENTIONS:  -  Assess patient's ability to carry out ADLs; assess patient's baseline for ADL function and identify physical deficits which impact ability to perform ADLs (bathing, care of mouth/teeth, toileting, grooming, dressing, etc )  - Assess/evaluate cause of self-care deficits   - Assess range of motion  - Assess patient's mobility; develop plan if impaired  - Assess patient's need for assistive devices and provide as appropriate  - Encourage maximum independence but intervene and supervise when necessary  - Involve family in performance of ADLs  - Assess for home care needs following discharge   - Consider OT consult to assist with ADL evaluation and planning for discharge  - Provide patient education as appropriate  Outcome: Progressing  Goal: Maintains/Returns to pre admission functional level  Description: INTERVENTIONS:  - Perform BMAT or MOVE assessment daily    - Set and communicate daily mobility goal to care team and patient/family/caregiver  - Collaborate with rehabilitation services on mobility goals if consulted  - Perform Range of Motion  times a day  - Reposition patient every  hours    - Dangle patient  times a day  - Stand patient  times a day  - Ambulate patient  times a day  - Out of bed to chair  times a day   - Out of bed for meals  times a day  - Out of bed for toileting  - Record patient progress and toleration of activity level   Outcome: Progressing

## 2022-07-18 ENCOUNTER — TELEPHONE (OUTPATIENT)
Dept: GASTROENTEROLOGY | Facility: CLINIC | Age: 77
End: 2022-07-18

## 2022-07-18 VITALS
RESPIRATION RATE: 16 BRPM | HEART RATE: 59 BPM | SYSTOLIC BLOOD PRESSURE: 154 MMHG | DIASTOLIC BLOOD PRESSURE: 75 MMHG | OXYGEN SATURATION: 99 % | TEMPERATURE: 98.1 F

## 2022-07-18 DIAGNOSIS — R94.31 QT PROLONGATION: Primary | ICD-10-CM

## 2022-07-18 DIAGNOSIS — R11.2 NAUSEA AND VOMITING, UNSPECIFIED VOMITING TYPE: ICD-10-CM

## 2022-07-18 LAB
ANION GAP SERPL CALCULATED.3IONS-SCNC: 4 MMOL/L (ref 4–13)
ATRIAL RATE: 68 BPM
ATRIAL RATE: 76 BPM
BUN SERPL-MCNC: 11 MG/DL (ref 5–25)
CALCIUM SERPL-MCNC: 8.2 MG/DL (ref 8.4–10.2)
CHLORIDE SERPL-SCNC: 108 MMOL/L (ref 96–108)
CO2 SERPL-SCNC: 30 MMOL/L (ref 21–32)
CREAT SERPL-MCNC: 0.99 MG/DL (ref 0.6–1.3)
ERYTHROCYTE [DISTWIDTH] IN BLOOD BY AUTOMATED COUNT: 15.9 % (ref 11.6–15.1)
GFR SERPL CREATININE-BSD FRML MDRD: 54 ML/MIN/1.73SQ M
GLUCOSE SERPL-MCNC: 214 MG/DL (ref 65–140)
GLUCOSE SERPL-MCNC: 72 MG/DL (ref 65–140)
GLUCOSE SERPL-MCNC: 88 MG/DL (ref 65–140)
HCT VFR BLD AUTO: 30.4 % (ref 34.8–46.1)
HGB BLD-MCNC: 9.4 G/DL (ref 11.5–15.4)
MCH RBC QN AUTO: 27.9 PG (ref 26.8–34.3)
MCHC RBC AUTO-ENTMCNC: 30.9 G/DL (ref 31.4–37.4)
MCV RBC AUTO: 90 FL (ref 82–98)
P AXIS: 13 DEGREES
P AXIS: 36 DEGREES
PLATELET # BLD AUTO: 225 THOUSANDS/UL (ref 149–390)
PMV BLD AUTO: 11.3 FL (ref 8.9–12.7)
POTASSIUM SERPL-SCNC: 3.7 MMOL/L (ref 3.5–5.3)
PR INTERVAL: 126 MS
PR INTERVAL: 128 MS
QRS AXIS: -37 DEGREES
QRS AXIS: 46 DEGREES
QRSD INTERVAL: 134 MS
QRSD INTERVAL: 84 MS
QT INTERVAL: 430 MS
QT INTERVAL: 444 MS
QTC INTERVAL: 495 MS
QTC INTERVAL: 499 MS
RBC # BLD AUTO: 3.37 MILLION/UL (ref 3.81–5.12)
SODIUM SERPL-SCNC: 142 MMOL/L (ref 135–147)
T WAVE AXIS: -75 DEGREES
T WAVE AXIS: 95 DEGREES
VENTRICULAR RATE: 76 BPM
VENTRICULAR RATE: 80 BPM
WBC # BLD AUTO: 5.79 THOUSAND/UL (ref 4.31–10.16)

## 2022-07-18 PROCEDURE — 82948 REAGENT STRIP/BLOOD GLUCOSE: CPT

## 2022-07-18 PROCEDURE — 99239 HOSP IP/OBS DSCHRG MGMT >30: CPT | Performed by: INTERNAL MEDICINE

## 2022-07-18 PROCEDURE — 93010 ELECTROCARDIOGRAM REPORT: CPT | Performed by: INTERNAL MEDICINE

## 2022-07-18 PROCEDURE — C9113 INJ PANTOPRAZOLE SODIUM, VIA: HCPCS | Performed by: INTERNAL MEDICINE

## 2022-07-18 PROCEDURE — 80048 BASIC METABOLIC PNL TOTAL CA: CPT | Performed by: INTERNAL MEDICINE

## 2022-07-18 PROCEDURE — 85027 COMPLETE CBC AUTOMATED: CPT | Performed by: INTERNAL MEDICINE

## 2022-07-18 PROCEDURE — 99232 SBSQ HOSP IP/OBS MODERATE 35: CPT | Performed by: INTERNAL MEDICINE

## 2022-07-18 PROCEDURE — 93005 ELECTROCARDIOGRAM TRACING: CPT

## 2022-07-18 RX ORDER — METOCLOPRAMIDE 10 MG/1
10 TABLET ORAL 4 TIMES DAILY PRN
Qty: 120 TABLET | Refills: 0
Start: 2022-07-18 | End: 2022-09-09 | Stop reason: SDUPTHER

## 2022-07-18 RX ORDER — CHOLESTYRAMINE LIGHT 4 G/5.7G
4 POWDER, FOR SUSPENSION ORAL 2 TIMES DAILY
Status: DISCONTINUED | OUTPATIENT
Start: 2022-07-18 | End: 2022-07-18 | Stop reason: HOSPADM

## 2022-07-18 RX ORDER — PROMETHAZINE HYDROCHLORIDE 25 MG/1
25 SUPPOSITORY RECTAL EVERY 6 HOURS PRN
Qty: 12 EACH | Refills: 2 | Status: SHIPPED | OUTPATIENT
Start: 2022-07-18 | End: 2022-08-09 | Stop reason: ALTCHOICE

## 2022-07-18 RX ORDER — PANTOPRAZOLE SODIUM 40 MG/1
40 TABLET, DELAYED RELEASE ORAL EVERY 12 HOURS
Qty: 180 TABLET | Refills: 0 | Status: SHIPPED | OUTPATIENT
Start: 2022-07-18 | End: 2022-10-16

## 2022-07-18 RX ORDER — FAMOTIDINE 20 MG/1
20 TABLET, FILM COATED ORAL EVERY 12 HOURS
Qty: 60 TABLET | Refills: 1 | Status: SHIPPED | OUTPATIENT
Start: 2022-07-18 | End: 2022-08-24 | Stop reason: ALTCHOICE

## 2022-07-18 RX ORDER — SUCRALFATE 1 G/1
1 TABLET ORAL 4 TIMES DAILY
Qty: 120 TABLET | Refills: 0 | OUTPATIENT
Start: 2022-07-18 | End: 2022-10-13

## 2022-07-18 RX ADMIN — PANTOPRAZOLE SODIUM 40 MG: 40 INJECTION, POWDER, FOR SOLUTION INTRAVENOUS at 09:13

## 2022-07-18 RX ADMIN — LISINOPRIL 20 MG: 20 TABLET ORAL at 09:14

## 2022-07-18 RX ADMIN — SUCRALFATE 1 G: 1 TABLET ORAL at 12:20

## 2022-07-18 RX ADMIN — CHOLESTYRAMINE 4 G: 4 POWDER, FOR SUSPENSION ORAL at 10:02

## 2022-07-18 RX ADMIN — LEVOTHYROXINE SODIUM 37.5 MCG: 75 TABLET ORAL at 05:24

## 2022-07-18 RX ADMIN — GABAPENTIN 300 MG: 300 CAPSULE ORAL at 09:13

## 2022-07-18 RX ADMIN — INSULIN LISPRO 2 UNITS: 100 INJECTION, SOLUTION INTRAVENOUS; SUBCUTANEOUS at 12:20

## 2022-07-18 RX ADMIN — SUCRALFATE 1 G: 1 TABLET ORAL at 05:24

## 2022-07-18 RX ADMIN — GUAIFENESIN 600 MG: 600 TABLET ORAL at 09:14

## 2022-07-18 RX ADMIN — FAMOTIDINE 20 MG: 20 TABLET ORAL at 05:24

## 2022-07-18 RX ADMIN — Medication 12.5 MG: at 09:13

## 2022-07-18 RX ADMIN — MAGNESIUM OXIDE TAB 400 MG (241.3 MG ELEMENTAL MG) 400 MG: 400 (241.3 MG) TAB at 09:13

## 2022-07-18 RX ADMIN — ENOXAPARIN SODIUM 40 MG: 40 INJECTION SUBCUTANEOUS at 09:14

## 2022-07-18 RX ADMIN — SUCRALFATE 1 G: 1 TABLET ORAL at 01:01

## 2022-07-18 RX ADMIN — ESCITALOPRAM OXALATE 10 MG: 10 TABLET ORAL at 09:13

## 2022-07-18 NOTE — DISCHARGE INSTR - AVS FIRST PAGE
Dear Herman Viramontes,     It was our pleasure to care for you here at [facility]  It is our hope that we were always able to exceed the expected standards for your care during your stay  You were hospitalized due to peptic ulcer  You were cared for on the 3rd floor by Anna Maurer MD under the service of [address] [place] [address] with the [facility] who covers for your primary care physician (PCP), Santi Martínez, while you were hospitalized  If you have any questions or concerns related to this hospitalization, you may contact us at [phone]  For follow up as well as any medication refills, we recommend that you follow up with your primary care physician  A registered nurse will reach out to you by phone within a few days after your discharge to answer any additional questions that you may have after going home  However, at this time we provide for you here, the most important instructions / recommendations at discharge:     Notable Medication Adjustments -   Please start taking pantoprazole 40 mg every 12 hours 30 minutes before meals  Please start taking Pepcid (famotidine) 20 mg every 12 hours  Start taking Carafate 1 g 4 times daily  Due to prolonged QTC interval in your EKG, will hold on zofran for now  Please follow-up with your PCP to determine when it is safe to resume this  You can take reglan as needed for nausea and vomiting  Testing Required after Discharge -   None, however recommend routine labs including complete blood count and basic metabolic panel which can be facilitated through your primary doctor  Important follow up information -   Follow-up with your primary doctor within 1 week/  Follow-up with gastroenterologist within 2-4 weeks  Contact information placed on the 2nd page for convenience  Other Instructions -   Maintain good hydration  Be compliant with your medications     Please call your primary doctor with any worsening symptoms or go to the nearest ED  The gastroenterologist office will call you to discuss the endoscopy biopsies results once they are available  Please review this entire after visit summary as additional general instructions including medication list, appointments, activity, diet, any pertinent wound care, and other additional recommendations from your care team that may be provided for you        Sincerely,     Gay Patino MD

## 2022-07-18 NOTE — CASE MANAGEMENT
Case Management Discharge Planning Note    Patient name Wes Bonner  Location S /S -01 MRN [id number]  : 1945 Date 2022       Current Admission Date: 2022  Current Admission Diagnosis:Prepyloric ulcer   Patient Active Problem List    Diagnosis Date Noted    Ambulatory dysfunction 2022    Intractable nausea and vomiting 2022    Hypomagnesemia 2022    Anemia 2022    Sepsis ([facility] [address] ) 2022    Prolonged Q-T interval on ECG 2022    Pneumobilia 2022    Stroke-like symptoms 2022    Hypoalbuminemia due to protein-calorie malnutrition ([facility] [address] ) 2021    Obesity, morbid ([facility] [address] ) 2021    Right lower quadrant abdominal pain 2021    Prepyloric ulcer 2021    Diarrhea 2021    Elevated troponin 2020    Right knee pain 2020    Melena 07/15/2020    Mild intermittent asthma without complication     S/P insertion of spinal cord stimulator 2018    Iron deficiency anemia secondary to inadequate dietary iron intake 2018    Failed back surgical syndrome 2018    Status post lumbar spinal fusion 2018    Pain in right wrist 2018    Hypothyroidism 2017    Degenerative lumbar spinal stenosis 2017    Glaucoma 2017    Overactive bladder 2016    Dyspepsia 2016    High cholesterol 2016    Type 2 diabetes mellitus ([facility] [address] ) 2015    Anxiety 2015    Chronic pain disorder 2013    Hypertension 2013    Postlaminectomy syndrome, lumbar 2012      LOS (days): 4  Geometric Mean LOS (GMLOS) (days): 2 50  Days to GMLOS:-1 6     OBJECTIVE:  Risk of Unplanned Readmission Score: 23 77         Current admission status: Inpatient   Preferred Pharmacy:   [address]  Phone: [phone] Fax: [phone]    [address], [place]  [address] [facility] [address]  Phone: [phone] Fax: [phone]    [address] [place], [place] - [address]  [address]  [address] [facility] [address]  Phone: [phone] Fax: [phone] [address] [place] [address]  [address] [facility] [address]  Phone: [phone] Fax: [phone]    Primary Care Provider: NANY Ni    Primary Insurance: [employer] [facility]  Secondary Insurance:     DISCHARGE DETAILS:    Discharge planning discussed with[de-identified] Patient and Hal Guadalupemaribel keyes  Freedom of Choice: Yes  Comments - Freedom of Choice: CM discussed discharge recommendations per our care team and patient is refusing STR or HHC at this time  CM spoke to daughterHal to confirm discharge plan ,  CM contacted family/caregiver?: Yes  Were Treatment Team discharge recommendations reviewed with patient/caregiver?: Yes  Did patient/caregiver verbalize understanding of patient care needs?: Yes  Were patient/caregiver advised of the risks associated with not following Treatment Team discharge recommendations?: Yes    Contacts  Patient Contacts: Hal AnayeliMaribel keyes  Relationship to Patient[de-identified] Family  Contact Method: In Person  Reason/Outcome: Discharge [address]         Is the patient interested in [facility] at discharge?: No    DME Referral Provided  Referral made for DME?: No    Other Referral/Resources/Interventions Provided:  Referral Comments: Patient refusing SNF or [facility] as recommended by care team  Patient also denies any equipment needs           Treatment Team Recommendation: Home  Discharge Destination Plan[de-identified] Home              ETA of Transport (Date): 07/18/22  ETA of Transport (Time): 0526     Transfer Mode: Wheelchair

## 2022-07-18 NOTE — PLAN OF CARE
Problem: Potential for Falls  Goal: Patient will remain free of falls  Description: INTERVENTIONS:  - Educate patient/family on patient safety including physical limitations  - Instruct patient to call for assistance with activity   - Consult OT/PT to assist with strengthening/mobility   - Keep Call bell within reach  - Keep bed low and locked with side rails adjusted as appropriate  - Keep care items and personal belongings within reach  - Initiate and maintain comfort rounds  - Make Fall Risk Sign visible to staff  - Offer Toileting every 2 Hours, in advance of need  - Initiate/Maintain 2alarm  - Obtain necessary fall risk management equipment: 2  - Apply yellow socks and bracelet for high fall risk patients  - Consider moving patient to room near nurses station  Outcome: Progressing     Problem: PAIN - ADULT  Goal: Verbalizes/displays adequate comfort level or baseline comfort level  Description: Interventions:  - Encourage patient to monitor pain and request assistance  - Assess pain using appropriate pain scale  - Administer analgesics based on type and severity of pain and evaluate response  - Implement non-pharmacological measures as appropriate and evaluate response  - Consider cultural and social influences on pain and pain management  - Notify physician/advanced practitioner if interventions unsuccessful or patient reports new pain  Outcome: Progressing     Problem: DISCHARGE PLANNING  Goal: Discharge to home or other facility with appropriate resources  Description: INTERVENTIONS:  - Identify barriers to discharge w/patient and caregiver  - Arrange for needed discharge resources and transportation as appropriate  - Identify discharge learning needs (meds, wound care, etc )  - Arrange for interpretive services to assist at discharge as needed  - Refer to Case Management Department for coordinating discharge planning if the patient needs post-hospital services based on physician/advanced practitioner order or complex needs related to functional status, cognitive ability, or social support system  Outcome: Progressing     Problem: GASTROINTESTINAL - ADULT  Goal: Minimal or absence of nausea and/or vomiting  Description: INTERVENTIONS:  - Administer IV fluids if ordered to ensure adequate hydration  - Maintain NPO status until nausea and vomiting are resolved  - Nasogastric tube if ordered  - Administer ordered antiemetic medications as needed  - Provide nonpharmacologic comfort measures as appropriate  - Advance diet as tolerated, if ordered  - Consider nutrition services referral to assist patient with adequate nutrition and appropriate food choices  Outcome: Progressing     Problem: SKIN/TISSUE INTEGRITY - ADULT  Goal: Skin Integrity remains intact(Skin Breakdown Prevention)  Description: Assess:  -Perform Jack assessment every 2  -Clean and moisturize skin every 2  -Inspect skin when repositioning, toileting, and assisting with ADLS  -Assess under medical devices such as 2 every 2  -Assess extremities for adequate circulation and sensation     Bed Management:  -Have minimal linens on bed & keep smooth, unwrinkled  -Change linens as needed when moist or perspiring  -Avoid sitting or lying in one position for more than 2 hours while in bed  -Keep HOB at 2degrees     Toileting:  -Offer bedside commode  -Assess for incontinence every 2  -Use incontinent care products after each incontinent episode such as 2    Activity:  -Mobilize patient 2 times a day  -Encourage activity and walks on unit  -Encourage or provide ROM exercises   -Turn and reposition patient every 2 Hours  -Use appropriate equipment to lift or move patient in bed  -Instruct/ Assist with weight shifting every 2 when out of bed in chair  -Consider limitation of chair time 2 hour intervals    Skin Care:  -Avoid use of baby powder, tape, friction and shearing, hot water or constrictive clothing  -Relieve pressure over bony prominences using 2  -Do not massage red bony areas    Next Steps:  -Teach patient strategies to minimize risks such as 2   -Consider consults to  interdisciplinary teams such as 2  Outcome: Progressing     Problem: Prexisting or High Potential for Compromised Skin Integrity  Goal: Skin integrity is maintained or improved  Description: INTERVENTIONS:  - Identify patients at risk for skin breakdown  - Assess and monitor skin integrity  - Assess and monitor nutrition and hydration status  - Monitor labs   - Assess for incontinence   - Turn and reposition patient  - Assist with mobility/ambulation  - Relieve pressure over bony prominences  - Avoid friction and shearing  - Provide appropriate hygiene as needed including keeping skin clean and dry  - Evaluate need for skin moisturizer/barrier cream  - Collaborate with interdisciplinary team   - Patient/family teaching  - Consider wound care consult   Outcome: Progressing     Problem: MOBILITY - ADULT  Goal: Maintain or return to baseline ADL function  Description: INTERVENTIONS:  -  Assess patient's ability to carry out ADLs; assess patient's baseline for ADL function and identify physical deficits which impact ability to perform ADLs (bathing, care of mouth/teeth, toileting, grooming, dressing, etc )  - Assess/evaluate cause of self-care deficits   - Assess range of motion  - Assess patient's mobility; develop plan if impaired  - Assess patient's need for assistive devices and provide as appropriate  - Encourage maximum independence but intervene and supervise when necessary  - Involve family in performance of ADLs  - Assess for home care needs following discharge   - Consider OT consult to assist with ADL evaluation and planning for discharge  - Provide patient education as appropriate  Outcome: Progressing  Goal: Maintains/Returns to pre admission functional level  Description: INTERVENTIONS:  - Perform BMAT or MOVE assessment daily    - Set and communicate daily mobility goal to care team and patient/family/caregiver  - Collaborate with rehabilitation services on mobility goals if consulted  - Perform Range of Motion 2 times a day  - Reposition patient every 2 hours    - Dangle patient 2 times a day  - Stand patient 2 times a day  - Ambulate patient 2 times a day  - Out of bed to chair 2 times a day   - Out of bed for meals 2 times a day  - Out of bed for toileting  - Record patient progress and toleration of activity level   Outcome: Progressing

## 2022-07-18 NOTE — TELEPHONE ENCOUNTER
Patients GI provider:  Dr Jack Rivas    Number to return call: [phone]    Reason for call: Pt calling stating she is in hospital  Cxld appt   For 7/19/22    Scheduled procedure/appointment date if applicable: Apt/procedure EGD in [facility]

## 2022-07-18 NOTE — PLAN OF CARE
Problem: Potential for Falls  Goal: Patient will remain free of falls  Description: INTERVENTIONS:  - Educate patient/family on patient safety including physical limitations  - Instruct patient to call for assistance with activity   - Consult OT/PT to assist with strengthening/mobility   - Keep Call bell within reach  - Keep bed low and locked with side rails adjusted as appropriate  - Keep care items and personal belongings within reach  - Initiate and maintain comfort rounds  - Make Fall Risk Sign visible to staff  - Offer Toileting every Hours, in advance of need  - Initiate/Maintain alarm  - Obtain necessary fall risk management equipment:   - Apply yellow socks and bracelet for high fall risk patients  - Consider moving patient to room near nurses station  Outcome: Completed     Problem: PAIN - ADULT  Goal: Verbalizes/displays adequate comfort level or baseline comfort level  Description: Interventions:  - Encourage patient to monitor pain and request assistance  - Assess pain using appropriate pain scale  - Administer analgesics based on type and severity of pain and evaluate response  - Implement non-pharmacological measures as appropriate and evaluate response  - Consider cultural and social influences on pain and pain management  - Notify physician/advanced practitioner if interventions unsuccessful or patient reports new pain  Outcome: Completed     Problem: DISCHARGE PLANNING  Goal: Discharge to home or other facility with appropriate resources  Description: INTERVENTIONS:  - Identify barriers to discharge w/patient and caregiver  - Arrange for needed discharge resources and transportation as appropriate  - Identify discharge learning needs (meds, wound care, etc )  - Arrange for interpretive services to assist at discharge as needed  - Refer to Case Management Department for coordinating discharge planning if the patient needs post-hospital services based on physician/advanced practitioner order or complex needs related to functional status, cognitive ability, or social support system  Outcome: Completed     Problem: GASTROINTESTINAL - ADULT  Goal: Minimal or absence of nausea and/or vomiting  Description: INTERVENTIONS:  - Administer IV fluids if ordered to ensure adequate hydration  - Maintain NPO status until nausea and vomiting are resolved  - Nasogastric tube if ordered  - Administer ordered antiemetic medications as needed  - Provide nonpharmacologic comfort measures as appropriate  - Advance diet as tolerated, if ordered  - Consider nutrition services referral to assist patient with adequate nutrition and appropriate food choices  Outcome: Completed     Problem: SKIN/TISSUE INTEGRITY - ADULT  Goal: Skin Integrity remains intact(Skin Breakdown Prevention)  Description: Assess:  -Perform Jack assessment every   -Clean and moisturize skin every   -Inspect skin when repositioning, toileting, and assisting with ADLS  -Assess under medical devices such as  every   -Assess extremities for adequate circulation and sensation     Bed Management:  -Have minimal linens on bed & keep smooth, unwrinkled  -Change linens as needed when moist or perspiring  -Avoid sitting or lying in one position for more than  hours while in bed  -Keep HOB at degrees     Toileting:  -Offer bedside commode  -Assess for incontinence every   -Use incontinent care products after each incontinent episode such as     Activity:  -Mobilize patient  times a day  -Encourage activity and walks on unit  -Encourage or provide ROM exercises   -Turn and reposition patient every  Hours  -Use appropriate equipment to lift or move patient in bed  -Instruct/ Assist with weight shifting every  when out of bed in chair  -Consider limitation of chair time  hour intervals    Skin Care:  -Avoid use of baby powder, tape, friction and shearing, hot water or constrictive clothing  -Relieve pressure over bony prominences using   -Do not massage red bony areas    Next Steps:  -Teach patient strategies to minimize risks such as    -Consider consults to  interdisciplinary teams such as   Outcome: Completed     Problem: Prexisting or High Potential for Compromised Skin Integrity  Goal: Skin integrity is maintained or improved  Description: INTERVENTIONS:  - Identify patients at risk for skin breakdown  - Assess and monitor skin integrity  - Assess and monitor nutrition and hydration status  - Monitor labs   - Assess for incontinence   - Turn and reposition patient  - Assist with mobility/ambulation  - Relieve pressure over bony prominences  - Avoid friction and shearing  - Provide appropriate hygiene as needed including keeping skin clean and dry  - Evaluate need for skin moisturizer/barrier cream  - Collaborate with interdisciplinary team   - Patient/family teaching  - Consider wound care consult   Outcome: Completed     Problem: PHYSICAL THERAPY ADULT  Goal: Performs mobility at highest level of function for planned discharge setting  See evaluation for individualized goals  Description: Treatment/Interventions: Functional transfer training, LE strengthening/ROM, Therapeutic exercise, Endurance training, Patient/family training, Bed mobility, Equipment eval/education, Gait training, Elevations  Equipment Recommended: Walker       See flowsheet documentation for full assessment, interventions and recommendations  Outcome: Completed     Problem: OCCUPATIONAL THERAPY ADULT  Goal: Performs self-care activities at highest level of function for planned discharge setting  See evaluation for individualized goals    Description: tcome: Completed     Problem: MOBILITY - ADULT  Goal: Maintain or return to baseline ADL function  Description: INTERVENTIONS:  -  Assess patient's ability to carry out ADLs; assess patient's baseline for ADL function and identify physical deficits which impact ability to perform ADLs (bathing, care of mouth/teeth, toileting, grooming, dressing, etc )  - Assess/evaluate cause of self-care deficits   - Assess range of motion  - Assess patient's mobility; develop plan if impaired  - Assess patient's need for assistive devices and provide as appropriate  - Encourage maximum independence but intervene and supervise when necessary  - Involve family in performance of ADLs  - Assess for home care needs following discharge   - Consider OT consult to assist with ADL evaluation and planning for discharge  - Provide patient education as appropriate  Outcome: Completed  Goal: Maintains/Returns to pre admission functional level  Description: INTERVENTIONS:  - Perform BMAT or MOVE assessment daily    - Set and communicate daily mobility goal to care team and patient/family/caregiver  - Collaborate with rehabilitation services on mobility goals if consulted  - Perform Range of Motion  times a day  - Reposition patient every  hours    - Dangle patient  times a day  - Stand patient  times a day  - Ambulate patient  times a day  - Out of bed to chair  times a day   - Out of bed for meals  times a day  - Out of bed for toileting  - Record patient progress and toleration of activity level   Outcome: Completed

## 2022-07-18 NOTE — ASSESSMENT & PLAN NOTE
· From my review, patient likely has chronic anemia  · Stable  · No active bleeding    · Follow-up with PCP for further evaluation and management

## 2022-07-18 NOTE — ASSESSMENT & PLAN NOTE
· Evauated by PT and recommended STR vs HHPT pending progress  · Discussed with Dr Mali Rodriguez , patient's daughter who prefers patient's to go home  Patient also refused home services

## 2022-07-18 NOTE — ASSESSMENT & PLAN NOTE
Lab Results   Component Value Date    HGBA1C 8 6 (H) 04/22/2022       Recent Labs     07/17/22  1545 07/17/22  2105 07/18/22  0751 07/18/22  1105   POCGLU 156* 186* 88 214*       Blood Sugar Average: Last 72 hrs:  (P) 796 [id number]   Follow-up with PCP for further evaluation management

## 2022-07-18 NOTE — ASSESSMENT & PLAN NOTE
· Patient takes Percocet at home as needed once or twice a day    Continue on discharge and follow-up with PCP

## 2022-07-18 NOTE — PHYSICAL THERAPY NOTE
Physical Therapy Cancellation Note       07/18/22 1514   PT Last Visit   PT Visit Date 07/18/22   Note Type   Note Type Cancelled Session   Cancel Reasons Other   Assessment   Assessment attempted to see pt for PT intervention but Juana FAROOQ reports she is scheduled to discharge from hospital between 15:30 and 16:00  PT session cancelled due to pending discharge       Camila Mai, PT

## 2022-07-18 NOTE — DISCHARGE SUMMARY
[facility]  Discharge- Lilia Crain 1945, 76 y o  female MRN: [id number]  Unit/Bed#: S -01 Encounter: [id number]  Primary Care Provider: NANY Adams   Date and time admitted to hospital: 7/14/2022 12:24 PM    * Prepyloric ulcer  Assessment & Plan    · EGD showed peptic stricture and pre-pyloric ulcer  · Continue with PPI b i d , Carafate, Pepcid and as needed Zofran  · Will hold zofran on discharge due to prolonged QTC  Patient follow-up with her PCP to have a repeat EKG before deciding if can resume this medication  · Can take Reglan as needed for N/V  · Continue with GI none ulcerogenic diet  This was placed on the discharge instructions   · Follow-up with GI as outpatient  GI will follow up with the patient in regard to the biopsy results  · As per GI, they recommending possibility of giving the patient Bentyl, however, my discussion with the patient, she has history of significant glaucoma, and Bentyl is contraindicated as this may worsen her glaucoma  Intractable nausea and vomiting-resolved as of 7/18/2022  Assessment & Plan  · Nausea has improved and vomiting has resolved  · Plan as above      Ambulatory dysfunction  Assessment & Plan  · Evauated by PT and recommended STR vs HHPT pending progress  · Discussed with Dr Ilana Cueva , patient's daughter who prefers patient's to go home  Patient also refused home services  Anemia  Assessment & Plan  · From my review, patient likely has chronic anemia  · Stable  · No active bleeding  · Follow-up with PCP for further evaluation and management    Hypertension  Assessment & Plan  Continue on metoprolol and lisinopril  Chronic pain disorder  Assessment & Plan  · Patient takes Percocet at home as needed once or twice a day    Continue on discharge and follow-up with PCP      Type 2 diabetes mellitus [facility])  Assessment & Plan  Lab Results   Component Value Date    HGBA1C 8 6 (H) 04/22/2022       Recent Labs     07/17/22  1545 07/17/22  2105 07/18/22  0751 07/18/22  1105   POCGLU 156* 186* 88 214*       Blood Sugar Average: Last 72 hrs:  (P) 239 [id number]   Follow-up with PCP for further evaluation management        Discharging Resident Physician: Christopher Taylor MD  Attending: Yvon Schaffer*  PCP: Mai Adams  Admission Date: 7/14/2022  Discharge Date: 07/18/22    Disposition:     Home    Reason for Admission:  Nausea and vomiting    Consultations During Hospital Stay:  · Gastroenterology    Procedures Performed:     · Endoscopy    Significant Findings / Test Results:     · EGD showed peptic stricture and pre-pyloric ulcer    Incidental Findings:   · None     Test Results Pending at Discharge (will require follow up): · None     Outpatient Tests Requested:  · None    Complications:  None    Hospital Course: Lilia Crain is a 76 y o  female patient who originally presented to the hospital on 7/14/2022 due to intractable nausea vomiting  She was seen by Gastroenterology and underwent upper endoscopy which showed peptic stricture and pre-pyloric ulcer  She was started on PPI b i d , Pepcid and Carafate along with antiemetics  She showed gradual improvement of her symptoms  As of today, she reports that she is doing well and she is able to tolerate her diet  Reglan will be held on discharge due to prolonged QTC and patient is instructed to follow-up with her PCP within 1 week to have a repeat EKG in order to decide if this medication can be resumed  Patient was evaluated by Physical therapy and recommended for short-term rehab however discussed with the patient daughter and they declined STR and favored for the patient to go home  Case management met with the patient to arrange home services, however the patient declined this as well  Discharge plans discussed with the patient her daughter and they are both agreeable    All medications sent to the patient's pharmacy she is currently stable for discharge with outpatient follow-up with PCP and Gastroenterology will be in touch with the patient to discuss her EGD biopsy results  Condition at Discharge: stable     Discharge Day Visit / Exam:     Subjective:  No acute events  Nausea has improved significantly  Vomiting has resolved and she is able to tolerate diet  No more abdominal pain  Diarrhea has improved which she has at baseline and she is taking Questran for this  Vitals: Blood Pressure: 154/75 (07/18/22 0726)  Pulse: 59 (07/18/22 0726)  Temperature: 98 1 °F (36 7 °C) (07/18/22 0726)  Temp Source: Temporal (07/15/22 1451)  Respirations: 16 (07/18/22 0726)  SpO2: 99 % (07/18/22 0726)  Exam:   Physical Exam  Vitals and nursing note reviewed  Constitutional:       General: She is not in acute distress  Appearance: She is not ill-appearing, toxic-appearing or diaphoretic  HENT:      Head: Normocephalic and atraumatic  Mouth/Throat:      Mouth: Mucous membranes are moist    Eyes:      Conjunctiva/sclera: Conjunctivae normal    Cardiovascular:      Rate and Rhythm: Normal rate and regular rhythm  Pulses: Normal pulses  Heart sounds: Normal heart sounds  Pulmonary:      Effort: Pulmonary effort is normal  No respiratory distress  Breath sounds: Normal breath sounds  Abdominal:      General: Bowel sounds are normal  There is no distension  Palpations: Abdomen is soft  Tenderness: There is no abdominal tenderness  There is no guarding or rebound  Musculoskeletal:      Cervical back: Normal range of motion and neck supple  Right lower leg: No edema  Left lower leg: No edema  Skin:     General: Skin is warm  Neurological:      General: No focal deficit present  Mental Status: She is alert and oriented to person, place, and time     Psychiatric:         Mood and Affect: Mood normal          Behavior: Behavior normal          Discussion with Family:  Daughter    Discharge instructions/Information to patient and family:   See after visit summary for information provided to patient and family  Provisions for Follow-Up Care:  See after visit summary for information related to follow-up care and any pertinent home health orders  Planned Readmission:  No     Discharge Medications:  See after visit summary for reconciled discharge medications provided to patient and family        ** Please Note: This note has been constructed using a voice recognition system **

## 2022-07-18 NOTE — ASSESSMENT & PLAN NOTE
· EGD showed peptic stricture and pre-pyloric ulcer  · Continue with PPI b i d , Carafate, Pepcid and as needed Zofran  · Will hold Reglan on discharge due to prolonged QTC  Patient follow-up with her PCP to have a repeat EKG before deciding if can resume this medication  · Continue with GI none ulcerogenic diet  This was placed on the discharge instructions   · Follow-up with GI as outpatient  GI will follow up with the patient in regard to the biopsy results  · As per GI, they recommending possibility of giving the patient Bentyl, however, my discussion with the patient, she has history of significant glaucoma, and Bentyl is contraindicated as this may worsen her glaucoma

## 2022-07-18 NOTE — PROGRESS NOTES
Progress Note - Sanjay Valdes 76 y o  female MRN: [id number]    Unit/Bed#: S -01 Encounter: [id number]        Subjective:   Patient reports feeling better today, denies any nausea present, reports she had breakfast this morning  Objective:     Vitals: Blood pressure 154/75, pulse 59, temperature 98 1 °F (36 7 °C), resp  rate 16, SpO2 99 %, not currently breastfeeding  ,There is no height or weight on file to calculate BMI  Intake/Output Summary (Last 24 hours) at 7/18/2022 1419  Last data filed at 7/18/2022 0531  Gross per 24 hour   Intake --   Output 500 ml   Net -500 ml       Physical Exam:   General appearance: alert, appears stated age and cooperative  Lungs: clear to auscultation bilaterally, no labored breathing/accessory muscle use  Heart: regular rate and rhythm, S1, S2 normal, no murmur, click, rub or gallop  Abdomen: soft, non-tender; bowel sounds normal; no masses,  no organomegaly  Extremities: no edema    Invasive Devices  Report    Peripheral Intravenous Line  Duration           Peripheral IV 07/14/22 Proximal;Right;Ventral (anterior) Antecubital 4 days                Lab, Imaging and other studies: I have personally reviewed pertinent reports      Admission on 07/14/2022   Component Date Value    TSH 3RD GENERATON 07/14/2022 0 854     hs TnI 0hr 07/14/2022 23     WBC 07/14/2022 8 06     RBC 07/14/2022 4 30     Hemoglobin 07/14/2022 11 7     Hematocrit 07/14/2022 37 6     MCV 07/14/2022 87     MCH 07/14/2022 27 2     MCHC 07/14/2022 31 1 (A)    RDW 07/14/2022 16 1 (A)    MPV 07/14/2022 10 2     Platelets 67/53/5102 329     nRBC 07/14/2022 0     Neutrophils Relative 07/14/2022 71     Immat GRANS % 07/14/2022 1     Lymphocytes Relative 07/14/2022 18     Monocytes Relative 07/14/2022 8     Eosinophils Relative 07/14/2022 1     Basophils Relative 07/14/2022 1     Neutrophils Absolute 07/14/2022 5 76     Immature Grans Absolute 07/14/2022 0 04     Lymphocytes Absolute 07/14/2022 1 46     Monocytes Absolute 07/14/2022 0 68     Eosinophils Absolute 07/14/2022 0 07     Basophils Absolute 07/14/2022 0 05     Sodium 07/14/2022 141     Potassium 07/14/2022 4 4     Chloride 07/14/2022 105     CO2 07/14/2022 30     ANION GAP 07/14/2022 6     BUN 07/14/2022 22     Creatinine 07/14/2022 1 07     Glucose 07/14/2022 131     Calcium 07/14/2022 9 1     AST 07/14/2022 17     ALT 07/14/2022 11     Alkaline Phosphatase 07/14/2022 98     Total Protein 07/14/2022 6 8     Albumin 07/14/2022 3 7     Total Bilirubin 07/14/2022 0 47     eGFR 07/14/2022 49     LACTIC ACID 07/14/2022 0 9     POC Glucose 07/14/2022 140     Lipase 07/14/2022 65     hs TnI 4hr 07/14/2022 23     Delta 4hr hsTnI 07/14/2022 0     POC Glucose 07/14/2022 129     POC Glucose 07/14/2022 122     POC Glucose 07/15/2022 109     Ventricular Rate 07/14/2022 95     Atrial Rate 07/14/2022 88     MD Interval 07/14/2022 130     QRSD Interval 07/14/2022 128     QT Interval 07/14/2022 426     QTC Interval 07/14/2022 535     P Axis 07/14/2022 37     QRS Axis 07/14/2022 -37     T Wave Axis 07/14/2022 92     Ventricular Rate 07/14/2022 98     Atrial Rate 07/14/2022 98     MD Interval 07/14/2022 128     QRSD Interval 07/14/2022 124     QT Interval 07/14/2022 414     QTC Interval 07/14/2022 528     P Axis 07/14/2022 -17     QRS Axis 07/14/2022 -36     T Wave Axis 07/14/2022 100     Ventricular Rate 07/14/2022 99     Atrial Rate 07/14/2022 99     MD Interval 07/14/2022 132     QRSD Interval 07/14/2022 126     QT Interval 07/14/2022 412     QTC Interval 07/14/2022 528     P Axis 07/14/2022 25     QRS Axis 07/14/2022 -36     T Wave Axis 07/14/2022 102     Ventricular Rate 07/15/2022 94     Atrial Rate 07/15/2022 94     MD Interval 07/15/2022 132     QRSD Interval 07/15/2022 128     QT Interval 07/15/2022 446     QTC Interval 07/15/2022 557     P Axis 07/15/2022 29     QRS Axis 07/15/2022 -14     T Wave Axis 07/15/2022 103     POC Glucose 07/15/2022 140     POC Glucose 07/15/2022 125     POC Glucose 07/15/2022 164 (A)    POC Glucose 07/16/2022 123     POC Glucose 07/16/2022 138     POC Glucose 07/16/2022 150 (A)    POC Glucose 07/16/2022 149 (A)    Sodium 07/17/2022 142     Potassium 07/17/2022 3 8     Chloride 07/17/2022 107     CO2 07/17/2022 29     ANION GAP 07/17/2022 6     BUN 07/17/2022 11     Creatinine 07/17/2022 0 92     Glucose 07/17/2022 102     Calcium 07/17/2022 8 5     eGFR 07/17/2022 59     Magnesium 07/17/2022 2 1     WBC 07/17/2022 6 80     RBC 07/17/2022 3 86     Hemoglobin 07/17/2022 10 4 (A)    Hematocrit 07/17/2022 34 3 (A)    MCV 07/17/2022 89     MCH 07/17/2022 26 9     MCHC 07/17/2022 30 3 (A)    RDW 07/17/2022 15 8 (A)    Platelets 27/01/2945 285     MPV 07/17/2022 10 9     POC Glucose 07/17/2022 135     POC Glucose 07/17/2022 171 (A)    POC Glucose 07/17/2022 156 (A)    POC Glucose 07/17/2022 186 (A)    WBC 07/18/2022 5 79     RBC 07/18/2022 3 37 (A)    Hemoglobin 07/18/2022 9 4 (A)    Hematocrit 07/18/2022 30 4 (A)    MCV 07/18/2022 90     MCH 07/18/2022 27 9     MCHC 07/18/2022 30 9 (A)    RDW 07/18/2022 15 9 (A)    Platelets 89/94/9034 225     MPV 07/18/2022 11 3     Sodium 07/18/2022 142     Potassium 07/18/2022 3 7     Chloride 07/18/2022 108     CO2 07/18/2022 30     ANION GAP 07/18/2022 4     BUN 07/18/2022 11     Creatinine 07/18/2022 0 99     Glucose 07/18/2022 72     Calcium 07/18/2022 8 2 (A)    eGFR 07/18/2022 54     POC Glucose 07/18/2022 88     POC Glucose 07/18/2022 214 (A)      Latest Reference Range & Units 07/17/22 21:05 07/18/22 04:55 07/18/22 07:51 07/18/22 11:05   POC Glucose 65 - 140 mg/dl 186 (H)  88 214 (H)   Sodium 135 - 147 mmol/L  142     Potassium 3 5 - 5 3 mmol/L  3 7     Chloride 96 - 108 mmol/L  108     CO2 21 - 32 mmol/L  30     Anion Gap 4 - 13 mmol/L  4     BUN 5 - 25 mg/dL  11 Creatinine 0 60 - 1 30 mg/dL  0 99     Glucose, Random 65 - 140 mg/dL  72     Calcium 8 4 - 10 2 mg/dL  8 2 (L)     eGFR ml/min/1 73sq m  54     WBC 4 31 - 10 16 Thousand/uL  5 79     Red Blood Cell Count 3 81 - 5 12 Million/uL  3 37 (L)     Hemoglobin 11 5 - 15 4 g/dL  9 4 (L)     HCT 34 8 - 46 1 %  30 4 (L)     MCV 82 - 98 fL  90     MCH 26 8 - 34 3 pg  27 9     MCHC 31 4 - 37 4 g/dL  30 9 (L)     RDW 11 6 - 15 1 %  15 9 (H)     Platelet Count 226 - 390 Thousands/uL  225     MPV 8 9 - 12 7 fL  11 3     (H): Data is abnormally high  (L): Data is abnormally low    Assessment/Plan:    1  GERD symptomatology, dysphagia and dyspepsia with EGD findings of open peptic strictures and pre-pyloric ulcer    Patient appears to be clinically improving at this time on PPI therapy    -outpatient EGD in 3 months to assess for healing of ulcer    -PPI therapy twice daily in the meantime    -continue Carafate    -antiemetics as needed, patient with history of gastroparesis, can use Reglan as needed

## 2022-07-19 ENCOUNTER — TRANSITIONAL CARE MANAGEMENT (OUTPATIENT)
Dept: FAMILY MEDICINE CLINIC | Facility: CLINIC | Age: 77
End: 2022-07-19

## 2022-07-19 ENCOUNTER — TELEPHONE (OUTPATIENT)
Dept: FAMILY MEDICINE CLINIC | Facility: CLINIC | Age: 77
End: 2022-07-19

## 2022-07-19 NOTE — TELEPHONE ENCOUNTER
----- Message from [id number]  [address] sent at 7/18/2022  2:29 PM EDT -----  Regarding: POCT ECG  Mrs Miguel A Lozada will need a nurse visit for point of care ECG in 2 weeks

## 2022-07-19 NOTE — UTILIZATION REVIEW
Notification of Discharge   This is a Notification of Discharge from our facility [address]  Please be advised that this patient has been discharge from our facility  Below you will find the admission and discharge date and time including the patients disposition  UTILIZATION REVIEW CONTACT:  Nigel Hatchet, MA  Utilization   Network Utilization Review Department  Phone: [phone] x carefully listen to the prompts  All voicemails are confidential   Email: [email or web address]     PHYSICIAN ADVISORY SERVICES:  FOR APJG-VE-XOIC REVIEW - MEDICAL NECESSITY DENIAL  Phone: [phone]  Fax: [phone]  Email: [email or web address]     PRESENTATION DATE: 7/14/2022 12:24 PM  OBERVATION ADMISSION DATE:   INPATIENT ADMISSION DATE: 7/14/22 12:35 PM   DISCHARGE DATE: 7/18/2022  4:23 PM  DISPOSITION: Home/Self Care Home/Self Care      IMPORTANT INFORMATION:  Send all requests for admission clinical reviews, approved or denied determinations and any other requests to dedicated fax number below belonging to the campus where the patient is receiving treatment   List of dedicated fax numbers:  [address] DENIALS (Administrative/Medical Necessity) [phone]   [address] (Maternity/NICU/Pediatrics) [phone]   Gini Flatten [phone]   130 [facility] [phone]   [address] [place] [address] [phone]   [address] [place] [address] [phone]   [facility]  [phone]   [address] [place] [address], [facility]  2401 [facility] 1000 [facility] [phone]

## 2022-07-22 DIAGNOSIS — M96.1 POST LAMINECTOMY SYNDROME: ICD-10-CM

## 2022-07-22 DIAGNOSIS — M54.50 LUMBAR BACK PAIN: ICD-10-CM

## 2022-07-22 RX ORDER — OXYCODONE AND ACETAMINOPHEN 10; 325 MG/1; MG/1
1 TABLET ORAL EVERY 6 HOURS PRN
Qty: 100 TABLET | Refills: 0 | Status: SHIPPED | OUTPATIENT
Start: 2022-07-22 | End: 2022-08-19 | Stop reason: SDUPTHER

## 2022-07-25 ENCOUNTER — HOSPITAL ENCOUNTER (EMERGENCY)
Facility: HOSPITAL | Age: 77
Discharge: HOME | End: 2022-07-25
Attending: EMERGENCY MEDICINE | Admitting: EMERGENCY MEDICINE
Payer: COMMERCIAL

## 2022-07-25 ENCOUNTER — APPOINTMENT (EMERGENCY)
Dept: CT IMAGING | Facility: HOSPITAL | Age: 77
End: 2022-07-25
Payer: COMMERCIAL

## 2022-07-25 VITALS
DIASTOLIC BLOOD PRESSURE: 78 MMHG | HEART RATE: 75 BPM | TEMPERATURE: 98.3 F | SYSTOLIC BLOOD PRESSURE: 150 MMHG | RESPIRATION RATE: 16 BRPM | OXYGEN SATURATION: 99 %

## 2022-07-25 DIAGNOSIS — Z78.9 NEED FOR FOLLOW-UP BY SOCIAL WORKER: Primary | ICD-10-CM

## 2022-07-25 DIAGNOSIS — S09.90XA INJURY OF HEAD, INITIAL ENCOUNTER: ICD-10-CM
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
## 2022-07-25 PROCEDURE — 99284 EMERGENCY DEPT VISIT MOD MDM: CPT | Performed by: EMERGENCY MEDICINE

## 2022-07-25 PROCEDURE — 72125 CT NECK SPINE W/O DYE: CPT

## 2022-07-25 PROCEDURE — 70450 CT HEAD/BRAIN W/O DYE: CPT

## 2022-07-25 PROCEDURE — G1004 CDSM NDSC: HCPCS

## 2022-07-25 PROCEDURE — 99284 EMERGENCY DEPT VISIT MOD MDM: CPT

## 2022-07-25 NOTE — ED PROVIDER NOTES
Emergency Department Trauma Note  Supriya Gay 76 y o  female MRN: [id number]  Unit/Bed#: Encampment 35/Encampment 35 Encounter: [id number]      Trauma Alert: Trauma Acuity: C  Model of Arrival:   via    Trauma Team: Current Providers  Attending Provider: Dorothea Duncan DO  ED Technician: Destiny Way  Registered Nurse: Javy Funk RN  Consultants:     None      History of Present Illness     Chief Complaint:   Chief Complaint   Patient presents with    Fall     Patient fell while in the kitchen  Takes ASA daily  HPI:  Supriya Gay is a 76 y o  female who presents with head injury from a fall  Patient states that she ambulates with a walker  She was in the kitchen and reached for her walker  Her walker tilted and she lost her balance  She fell onto her buttock and struck her head against the kitchen cabinet  She denies loss of consciousness  Her son was home and heard her fall  He called EMS, and patient was transported to the emergency department  Patient is on aspirin daily  She denies any other blood thinners  She denies any chest pain, shortness of breath, dizziness, palpitations or other symptoms preceding the fall      Mechanism:Details of Incident: patient was standing in the kitchen and fell, NO LOC, back of the head strike, takes ASA daily Injury Date: 07/25/22 Injury Time: 1015        History provided by:  Patient and EMS personnel  Fall  Mechanism of injury: fall    Injury location:  Head/neck  Head/neck injury location:  Head  Incident location:  Home  Arrived directly from scene: yes    Fall:     Fall occurred:  Standing    Point of impact:  Head  Suspicion of alcohol use: no    Suspicion of drug use: no    Prior to arrival data:     Loss of consciousness: no      Amnesic to event: no    Associated symptoms: no abdominal pain, no back pain, no chest pain, no difficulty breathing, no headaches, no loss of consciousness, no nausea, no neck pain, no seizures and no vomiting    Risk factors: no anticoagulation therapy      Review of Systems   Constitutional: Negative for chills, diaphoresis and fever  HENT: Negative for nosebleeds, sore throat and trouble swallowing  Eyes: Negative for photophobia, pain and visual disturbance  Respiratory: Negative for cough, chest tightness and shortness of breath  Cardiovascular: Negative for chest pain, palpitations and leg swelling  Gastrointestinal: Negative for abdominal pain, constipation, diarrhea, nausea and vomiting  Endocrine: Negative for polydipsia and polyuria  Genitourinary: Negative for difficulty urinating, dysuria, hematuria, pelvic pain, vaginal bleeding and vaginal discharge  Musculoskeletal: Negative for back pain, neck pain and neck stiffness  Skin: Negative for pallor and rash  Neurological: Negative for dizziness, seizures, loss of consciousness, light-headedness and headaches  All other systems reviewed and are negative        Historical Information     Immunizations:   Immunization History   Administered Date(s) Administered    COVID-19 PFIZER VACCINE 0 3 ML IM 01/13/2021, 02/03/2021, 10/30/2021    Influenza Split High Dose Preservative Free IM 10/21/2011, 10/23/2013, 09/28/2015, 10/31/2016, 10/02/2017    Influenza, high dose seasonal 0 7 mL 10/25/2018, 10/11/2019, 10/07/2020, 12/15/2021    Influenza, seasonal, injectable 10/16/2007, 11/16/2008, 10/17/2009    Pneumococcal Conjugate 13-Valent 10/31/2016    Pneumococcal Polysaccharide PPV23 04/17/2015    TD (adult) Preservative Free 03/21/2000    Td (adult), adsorbed 03/11/2010, 05/05/2017    Tdap 04/04/2000    Tuberculin Skin Test-PPD Intradermal 04/15/2015, 05/01/2015, 10/31/2016, 11/07/2016       Past Medical History:   Diagnosis Date    Acid reflux     Anemia     hx of iron-deficient    Anxiety     Arthritis     Asthma     last needed inhaler last year 2020    Basal cell carcinoma     upper lip    Chronic narcotic dependence (HCC)     Chronic pain     Colon polyp     Cystocele     Diabetes mellitus ([facility] [address] )     stable    Disease of thyroid gland     hypothyroidism    Diverticulosis     Dysfunctional uterine bleeding     last assessed - [id number]    Fibromyalgia     Gastric ulcer     Gastroparesis     History of colonic polyps     last assessed - [id number]    History of gastroesophageal reflux (GERD)     Hypercholesterolemia     Hyperlipidemia     Hypertension     IBS (irritable bowel syndrome)     Post laminectomy syndrome     Seasonal allergies     Spinal stenosis        Family History   Problem Relation Age of Onset    Lung cancer Mother 55    Pulmonary embolism Father     No Known Problems Sister     No Known Problems Daughter     No Known Problems Daughter     Stroke Maternal Grandmother     Heart attack Maternal Grandfather     No Known Problems Paternal Grandmother     No Known Problems Paternal Grandfather     No Known Problems Maternal Aunt     Diabetes Family         Diabetes mellitus    Hypertension Family     Stroke Family         Stroke complications     Past Surgical History:   Procedure Laterality Date    APPENDECTOMY      BACK SURGERY      BREAST CYST EXCISION Left     CHOLECYSTECTOMY      COLONOSCOPY      ESOPHAGOGASTRODUODENOSCOPY N/A 09/28/2016    Procedure: ESOPHAGOGASTRODUODENOSCOPY (EGD); Surgeon: Cecilia Sutherland MD;  [facility];   Service:     HERNIA REPAIR      HYSTERECTOMY      TTAH-BSO age 25   [facility] LAMINECTOMY      LUMBAR LAMINECTOMY      OOPHORECTOMY      age 25   [facility] SC ARTHRODESIS POSTERIOR/POSTEROLATERAL THORACIC N/A 06/04/2018    Procedure: Reopening of lumbar incision for T12-L5 posterior instrumented fixation and fusion and T12-L4 posterior decompression;  Surgeon: Tigist Guevara MD;  [facility];  Service: Neurosurgery    SC COLONOSCOPY FLX DX W/COLLJ SPEC WHEN PFRMD N/A 03/02/2016    Procedure: EGD AND COLONOSCOPY;  Surgeon: Cecilia Sutherland MD;  [facility]; Service: Gastroenterology    CO DILATE ESOPHAGUS N/A 2016    Procedure: DILATATION ESOPHAGEAL;  Surgeon: Katia Siddiqi MD;  [facility]; Service: Gastroenterology    CO ESOPHAGOGASTRODUODENOSCOPY TRANSORAL DIAGNOSTIC N/A 2016    Procedure: ESOPHAGOGASTRODUODENOSCOPY (EGD); Surgeon: Katia Siddiqi MD;  [facility]; Service: Gastroenterology    CO IMPLANT SPINAL NEUROSTIM/ Left 2018    Procedure: removal of left buttock implantable pulse generator and placement of new  implantable pulse generator;  Surgeon: Eli Lockhart MD;  [facility];  Service: Neurosurgery     Social History     Tobacco Use    Smoking status: Former Smoker     Types: Cigarettes     Quit date: 1970     Years since quittin 6    Smokeless tobacco: Never Used    Tobacco comment: Denied history of current ever day smoker, Former smoker and Never smoker all documented in [employer]   Vaping Use    Vaping Use: Never used   Substance Use Topics    Alcohol use: Not Currently     Comment: Denied history of alcohol use    Drug use: No     Comment: Denied history of drug use     E-Cigarette/Vaping    E-Cigarette Use Never User      E-Cigarette/Vaping Substances    Nicotine No     THC No     CBD No     Flavoring No     Other No     Unknown No        Family History: non-contributory    Meds/Allergies   Prior to Admission Medications   Prescriptions Last Dose Informant Patient Reported? Taking?    Milnacipran HCl (Savella) 100 MG TABS  Self No No   Sig: Take 1 tablet (100 mg total) by mouth daily   albuterol (PROVENTIL HFA,VENTOLIN HFA) 90 mcg/act inhaler  Self No No   Sig: Inhale 2 puffs every 6 (six) hours as needed for wheezing or shortness of breath   aspirin 81 mg chewable tablet  Self No No   Sig: Chew 1 tablet (81 mg total) daily   atorvastatin (LIPITOR) 40 mg tablet  Self No No   Sig: Take 1 tablet (40 mg total) by mouth daily   baclofen 10 mg tablet  Self No No   Sig: Take one tablet by mouth three times a day as needed for muscle spasms   cholestyramine (QUESTRAN) 4 g packet  Self No No   Sig: Take 1 packet (4 g total) by mouth 2 (two) times a day with meals   escitalopram (Lexapro) 20 mg tablet  Self No No   Sig: Take 0 5 tablets (10 mg total) by mouth daily   famotidine (PEPCID) 20 mg tablet   No No   Sig: Take 1 tablet (20 mg total) by mouth every 12 (twelve) hours   gabapentin (NEURONTIN) 300 mg capsule  Self No No   Sig: Take 1 capsule (300 mg total) by mouth 2 (two) times a day   glucose blood test strip  Self No No   Sig: Bid testing   latanoprost (XALATAN) 0 005 % ophthalmic solution  Self No No   Sig: Administer 1 drop to both eyes daily at bedtime   levothyroxine 25 mcg tablet  Self No No   Sig: Take 1 5 tablets (37 5 mcg total) by mouth daily   lisinopril (ZESTRIL) 20 mg tablet  Self No No   Sig: Take 1 tablet (20 mg total) by mouth daily   magnesium Oxide (MAG-OX) 400 mg TABS  Self No No   Sig: Take 1 tablet (400 mg total) by mouth 2 (two) times a day   meclizine (ANTIVERT) 25 mg tablet  Self No No   Sig: Take 1 tablet (25 mg total) by mouth 4 (four) times a day as needed for dizziness   metFORMIN (GLUCOPHAGE) 1000 MG tablet  Self No No   Sig: Take 1 tablet (1,000 mg total) by mouth 2 (two) times a day with meals   metoclopramide (REGLAN) 10 mg tablet   No No   Sig: Take 1 tablet (10 mg total) by mouth 4 (four) times a day as needed (Nausea and vomiting)   metoprolol tartrate (LOPRESSOR) 25 mg tablet  Self No No   Sig: Take 0 5 tablets (12 5 mg total) by mouth every 12 (twelve) hours   nystatin (MYCOSTATIN) powder  Self No No   Sig: Apply topically 2 (two) times a day   oxyCODONE-acetaminophen (PERCOCET)  mg per tablet   No No   Sig: Take 1 tablet by mouth every 6 (six) hours as needed for severe pain Max Daily Amount: 4 tablets   pantoprazole (PROTONIX) 40 mg tablet   No No   Sig: Take 1 tablet (40 mg total) by mouth every 12 (twelve) hours   promethazine (PHENERGAN) 25 mg suppository   No No   Sig: Insert 1 suppository (25 mg total) into the rectum every 6 (six) hours as needed for nausea or vomiting   saccharomyces boulardii (FLORASTOR) 250 mg capsule  Self No No   Sig: Take 1 capsule (250 mg total) by mouth 2 (two) times a day   sucralfate (CARAFATE) 1 g tablet   No No   Sig: Take 1 tablet (1 g total) by mouth 4 (four) times a day   trospium chloride (SANCTURA) 20 mg tablet  Self No No   Sig: Take 1 tablet (20 mg total) by mouth 2 (two) times a day   zolpidem (AMBIEN) 10 mg tablet  Self No No   Sig: Take 0 5 tablets (5 mg total) by mouth daily at bedtime as needed for sleep      Facility-Administered Medications: None       Allergies   Allergen Reactions    Penicillins Anaphylaxis, Hives and Other (See Comments)     Other reaction(s): Unknown Reaction    Sulfa Antibiotics Anaphylaxis and Other (See Comments)     Other reaction(s): Unknown Reaction    Aspartame - Food Allergy Other (See Comments) and Hypertension     Slurred speech, weakness, stroke sx    Iodinated Diagnostic Agents Hives     Pt has taken prep prior for contrast and has not had any break through reaction    Keflex [Cephalexin] Hives    Levaquin [Levofloxacin] Rash       PHYSICAL EXAM    PE limited by: n/a    Objective   Vitals:   First set: Temperature: 98 3 °F (36 8 °C) (07/25/22 1118)  Pulse: 80 (07/25/22 1118)  Respirations: 15 (07/25/22 1118)  Blood Pressure: 138/89 (07/25/22 1118)  SpO2: 98 % (07/25/22 1118)    Primary Survey:   (A) Airway: patent and protected  (B) Breathing: breath sounds equal bilaterally  (C) Circulation: Pulses:   normal  (D) Disabliity:  GCS Total:  15  (E) Expose:  Completed    Secondary Survey: (Click on Physical Exam tab above)  Physical Exam  Vitals and nursing note reviewed  Constitutional:       General: She is not in acute distress  Appearance: She is well-developed  HENT:      Head: Normocephalic        Comments: Small hematoma to the left parietal scalp    Eyes:      Pupils: Pupils are equal, round, and reactive to light  Cardiovascular:      Rate and Rhythm: Normal rate and regular rhythm  Pulses: Normal pulses  Heart sounds: Normal heart sounds  Pulmonary:      Effort: Pulmonary effort is normal  No respiratory distress  Breath sounds: Normal breath sounds  Abdominal:      General: There is no distension  Palpations: Abdomen is soft  Abdomen is not rigid  Tenderness: There is no abdominal tenderness  There is no guarding or rebound  Musculoskeletal:         General: No tenderness  Normal range of motion  Cervical back: Normal range of motion and neck supple  Comments: Hips non tender  Pelvis stable   Lymphadenopathy:      Cervical: No cervical adenopathy  Skin:     General: Skin is warm and dry  Capillary Refill: Capillary refill takes less than 2 seconds  Neurological:      Mental Status: She is alert and oriented to person, place, and time  Cranial Nerves: No cranial nerve deficit  Sensory: No sensory deficit  Cervical spine cleared by clinical criteria? No (imaging required)      Invasive Devices  Report    None                 Lab Results:   Results Reviewed     None                 Imaging Studies:   Direct to CT: Yes  CT head without contrast   Final Result by Azra Guthrie MD (07/25 1153)      No acute intracranial abnormality  Nonemergent findings above  Workstation performed: [id number]         CT spine cervical without contrast   Final Result by Azra Guthrie MD (07/25 1204)      No cervical spine fracture or traumatic malalignment  Workstation performed: [id number]               Procedures  Procedures         ED Course  ED Course as of 07/27/22 0711   Mon Jul 25, 2022   1232 Discussed results of imaging  Daughter concerned with ability to care for herself at home  Will discuss with case management     1244 Case management will evaluate patient at bedside and speak with daughter           MDM  Number of Diagnoses or Management Options  Fall, initial encounter: new and requires workup  Injury of head, initial encounter: new and requires workup  Diagnosis management comments: Patient presents after a mechanical fall  Patient did not have any symptoms preceding the fall, including but not limited to chest pain, shortness of breath, palpitations, dizziness  CT head and cervical spine do not reveal acute traumatic injury  Patient medically stable, however daughter expresses concerns for her safety at home  Although patient does live with her son, he has his own medical issues and daughter is concerned with her falling  I had case management speak with patient and daughter  After this discussion, daughter is comfortable with discharge home and will follow-up with the resources that were given to her  Patient is well-appearing and stable for discharge  Portions of the above record have been created with voice recognition software   Occasional wrong word or "sound alike" substitutions may have occurred due to the inherent limitations of voice recognition software   Read the chart carefully and recognize, using context, where substitutions may have occurred           Amount and/or Complexity of Data Reviewed  Tests in the radiology section of CPT®: ordered and reviewed  Review and summarize past medical records: yes  Independent visualization of images, tracings, or specimens: yes    Risk of Complications, Morbidity, and/or Mortality  Presenting problems: high  Diagnostic procedures: high  Management options: moderate    Patient Progress  Patient progress: stable          Disposition  Priority One Transfer: No  Final diagnoses:   Fall, initial encounter   Injury of head, initial encounter     Time reflects when diagnosis was documented in both MDM as applicable and the Disposition within this note     Time User Action Codes Description Comment    7/25/2022 1:32 PM Kaci Rodgers Add [P36  GBAQ] Fall, initial encounter     7/25/2022  1:33 PM Kaci Rodgers Add [H52 42NI] Injury of head, initial encounter       ED Disposition     ED Disposition   Discharge    Condition   Stable    Date/Time   Mon Jul 25, 2022  1:32 PM    Comment   Qi Varela discharge to home/self care                 Follow-up Information     Follow up With Specialties Details [address], Nurse Practitioner Schedule an appointment as soon as possible for a visit   [address] [id number] [facility]  [phone]          Discharge Medication List as of 7/25/2022  1:34 PM      CONTINUE these medications which have NOT CHANGED    Details   albuterol (PROVENTIL HFA,VENTOLIN HFA) 90 mcg/act inhaler Inhale 2 puffs every 6 (six) hours as needed for wheezing or shortness of breath, Starting Wed 6/29/2022, Normal      aspirin 81 mg chewable tablet Chew 1 tablet (81 mg total) daily, Starting Tue 6/21/2022, Until Thu 7/21/2022, Normal      atorvastatin (LIPITOR) 40 mg tablet Take 1 tablet (40 mg total) by mouth daily, Starting Fri 6/3/2022, Normal      baclofen 10 mg tablet Take one tablet by mouth three times a day as needed for muscle spasms, Normal      cholestyramine (QUESTRAN) 4 g packet Take 1 packet (4 g total) by mouth 2 (two) times a day with meals, Starting Thu 6/16/2022, Normal      escitalopram (Lexapro) 20 mg tablet Take 0 5 tablets (10 mg total) by mouth daily, Starting Wed 6/29/2022, Normal      famotidine (PEPCID) 20 mg tablet Take 1 tablet (20 mg total) by mouth every 12 (twelve) hours, Starting Mon 7/18/2022, Normal      gabapentin (NEURONTIN) 300 mg capsule Take 1 capsule (300 mg total) by mouth 2 (two) times a day, Starting Wed 6/29/2022, Normal      glucose blood test strip Bid testing, Print      latanoprost (XALATAN) 0 005 % ophthalmic solution Administer 1 drop to both eyes daily at bedtime, Starting Fri 10/2/2020, Until Wed 6/29/2022, Normal      levothyroxine 25 mcg tablet Take 1 5 tablets (37 5 mcg total) by mouth daily, Starting Tue 6/21/2022, Until Thu 7/21/2022, Normal      lisinopril (ZESTRIL) 20 mg tablet Take 1 tablet (20 mg total) by mouth daily, Starting Fri 6/24/2022, Until Sat 10/22/2022, Normal      magnesium Oxide (MAG-OX) 400 mg TABS Take 1 tablet (400 mg total) by mouth 2 (two) times a day, Starting Tue 6/21/2022, Normal      meclizine (ANTIVERT) 25 mg tablet Take 1 tablet (25 mg total) by mouth 4 (four) times a day as needed for dizziness, Starting Tue 6/21/2022, Until Sun 7/31/2022 at 2359, Normal      metFORMIN (GLUCOPHAGE) 1000 MG tablet Take 1 tablet (1,000 mg total) by mouth 2 (two) times a day with meals, Starting Wed 3/9/2022, Normal      metoclopramide (REGLAN) 10 mg tablet Take 1 tablet (10 mg total) by mouth 4 (four) times a day as needed (Nausea and vomiting), Starting Mon 7/18/2022, No Print      metoprolol tartrate (LOPRESSOR) 25 mg tablet Take 0 5 tablets (12 5 mg total) by mouth every 12 (twelve) hours, Starting Mon 5/23/2022, Normal      Milnacipran HCl (Savella) 100 MG TABS Take 1 tablet (100 mg total) by mouth daily, Starting Mon 9/13/2021, Normal      nystatin (MYCOSTATIN) powder Apply topically 2 (two) times a day, Starting Tue 6/21/2022, Normal      oxyCODONE-acetaminophen (PERCOCET)  mg per tablet Take 1 tablet by mouth every 6 (six) hours as needed for severe pain Max Daily Amount: 4 tablets, Starting Fri 7/22/2022, Normal      pantoprazole (PROTONIX) 40 mg tablet Take 1 tablet (40 mg total) by mouth every 12 (twelve) hours, Starting Mon 7/18/2022, Until Sun 10/16/2022, Normal      promethazine (PHENERGAN) 25 mg suppository Insert 1 suppository (25 mg total) into the rectum every 6 (six) hours as needed for nausea or vomiting, Starting Mon 7/18/2022, Normal      saccharomyces boulardii (FLORASTOR) 250 mg capsule Take 1 capsule (250 mg total) by mouth 2 (two) times a day, Starting Tue 6/21/2022, Normal      sucralfate (CARAFATE) 1 g tablet Take 1 tablet (1 g total) by mouth 4 (four) times a day, Starting Mon 7/18/2022, Normal      trospium chloride (SANCTURA) 20 mg tablet Take 1 tablet (20 mg total) by mouth 2 (two) times a day, Starting Mon 3/28/2022, Normal      zolpidem (AMBIEN) 10 mg tablet Take 0 5 tablets (5 mg total) by mouth daily at bedtime as needed for sleep, Starting Wed 6/29/2022, Normal           No discharge procedures on file      PDMP Review       Value Time User    PDMP Reviewed  Yes 7/22/2022  3:15 PM Nohemy Higginbotham DO          ED Provider  Electronically Signed by         Paul Bello DO  07/27/22 4974

## 2022-07-25 NOTE — CASE MANAGEMENT
Case Management Assessment & Discharge Planning Note    Patient name Jef Vernon  Location [facility] MRN [id number]  : 1945 Date 2022       Current Admission Date: 2022  Current Admission Diagnosis:Weakness   Patient Active Problem List    Diagnosis Date Noted    Ambulatory dysfunction 2022    Hypomagnesemia 2022    Anemia 2022    Sepsis ([facility] ) 2022    Prolonged Q-T interval on ECG 2022    Pneumobilia 2022    Stroke-like symptoms 2022    Hypoalbuminemia due to protein-calorie malnutrition ([address] ) 2021    Obesity, morbid ([facility] ) 2021    Right lower quadrant abdominal pain 2021    Prepyloric ulcer 2021    Diarrhea 2021    Elevated troponin 2020    Right knee pain 2020    Melena 07/15/2020    Mild intermittent asthma without complication     S/P insertion of spinal cord stimulator 2018    Iron deficiency anemia secondary to inadequate dietary iron intake 2018    Failed back surgical syndrome 2018    Status post lumbar spinal fusion 2018    Pain in right wrist 2018    Hypothyroidism 2017    Degenerative lumbar spinal stenosis 2017    Glaucoma 2017    Overactive bladder 2016    Dyspepsia 2016    High cholesterol 2016    Type 2 diabetes mellitus ([facility] [address] [facility] ) 2015    Anxiety 2015    Chronic pain disorder 2013    Hypertension 2013    Postlaminectomy syndrome, lumbar 2012      LOS (days): 0  Geometric Mean LOS (GMLOS) (days):   Days to GMLOS:     OBJECTIVE:              Current admission status: Emergency       Preferred Pharmacy:   [address]  Phone: [phone] Fax: [phone]    [address] [address] [address] [facility] [place] [address]  Phone: [phone] Fax: [phone]    [address] [place], [place] - [address]  [address]  [address] [facility] [address]  Phone: [phone] Fax: [phone]  [facility], [address] [address]  Phone: [phone] Fax: [phone]    Primary Care Provider: NANY Mora    Primary Insurance: [facility]  Secondary Insurance:     ASSESSMENT:  Jacki Meyer Proxies    There are no active Health Care Proxies on file  Patient Information  Admitted from[de-identified] Home  Mental Status: Alert  During Assessment patient was accompanied by: Daughter (daughter, Elena Napier)  Assessment information provided by[de-identified] Daughter  Primary Caregiver: Self  Support Systems: Self, [address] of Residence: [address] [place] [address] do you live in?: [place], [address] [place] [address] Street entry access options   Select all that apply : No steps to enter home  Type of Current Residence: Apartment  Floor Level: 1  In the last 12 months, was there a time when you were not able to pay the mortgage or rent on time?: No  In the last 12 months, how many places have you lived?: 1  In the last 12 months, was there a time when you did not have a steady place to sleep or slept in a shelter (including now)?: No  Homeless/housing insecurity resource given?: N/A  Living Arrangements: Lives w/ Son    Activities of Daily Living Prior to Admission  Functional Status: Assistance  Completes ADLs independently?: No  Level of ADL dependence: Assistance  Ambulates independently?: No  Level of ambulatory dependence: Assistance  Does patient use assisted devices?: Yes  Does patient currently own DME?: Yes  Does patient have a history of Outpatient Therapy (PT/OT)?: No  Does the patient have a history of Short-Term Rehab?: No  Does patient have a history of HHC?: Yes ([facility])  Does patient currently have [address]?: No         Patient Information Continued  Does patient have prescription coverage?: Yes  Within the past 12 months, you worried that your food would run out before you got the money to buy more : Never true  Within the past 12 months, the food you bought just didn't last and you didn't have money to get more : Never true  Food insecurity resource given?: N/A  Does patient receive dialysis treatments?: No  Does patient have a history of substance abuse?: No  Does patient have a history of Mental Health Diagnosis?: No         Means of Transportation  Means of Transport to Appts[de-identified] Family transport  In the past 12 months, has lack of transportation kept you from medical appointments or from getting medications?: No  In the past 12 months, has lack of transportation kept you from meetings, work, or from getting things needed for daily living?: No  Was application for public transport provided?: N/A        DISCHARGE DETAILS:    Discharge planning discussed with[de-identified] patient's daughter, Monie Hancock, at bedside  [place] of Choice: Yes (re: home care)  Comments - Freedom of Choice: discussed difference between home care services through insurance and private-duty services; referral initially made for home nursing, PT/OT, but daughter ultimately declined same  Reports needs are more for private-duty services to be there to assist patient throughout the day  CM contacted family/caregiver?: Yes (daughter, Monie Hancock, at bedside)  Were Treatment Team discharge recommendations reviewed with patient/caregiver?: Yes  Did patient/caregiver verbalize understanding of patient care needs?: Yes  Were patient/caregiver advised of the risks associated with not following Treatment Team discharge recommendations?: Yes    Contacts  Patient Contacts: Monie Hancock, daughter  Relationship to Patient[de-identified] Family  Contact Method:  In Person  Reason/Outcome: Continuity of Care, Emergency Contact, Referral, Discharge [address]         Is the patient interested in [facility] at discharge?: No    DME Referral Provided  Referral made for DME?: No    Other Referral/Resources/Interventions Provided:  Interventions: HHC, HHA  Referral Comments: Patient currently in the ED following a fall at home  Per MD, patient clear for d/c, but family inquiring about additional in-home services  Patient was recently admitted 6/18-6/21 due to sepsis, and then 7/14-7/18 due to gastroparesis; PT had recommended rehab both times, but patient ultimately d/c'd home with no services  Met with patient and daughter, Pilar Curry, at bedside to discuss same  Daughter inquiring about additional in-home services for patient and discussed home care services through insurance (nursing, PT/OT) as well as private-duty services  Offer made to arrange home nursing, PT/OT and discussed [place] [facility]/Aging Referral for additional in-home services, as daughter reports patient is low-income and unable to afford private-duty care  Referral sent to [address] [place] [address] , who was able to accept patient, but daughter ultimately declined nursing, PT/OT reporting that needs are more for someone to be with patient throughout the day to assist with her care, rather than for small pockets of time  Referral for home care cancelled per daughter's request  Provided daughter with information on how to apply for waiver services and listing of private-duty agencies and brochures for [employer] and [employer] [facility]  Referral called in to Aging Office to request assessment for home services; spoke with Christie Donovan to complete intake assessment - report they will reach out to family to follow-up  Spoke with daughter, Pilar Curry, about process for waiver services, and encouraged her to contact the [place] IE[facility] and apply for ALICE Curry aware that Aging Office will be reaching out to initiate assessment for any other services patient may be eligible for   Informed her of timeline for waiver services (application, approval, and initiation of care) and daughter aware that process can take some time  Referral made for out-patient CM to assist with process  Daughter reports that she will transport patient home      Would you like to participate in our [address] service program?  : No - Declined    Treatment Team Recommendation: Home with [facility]  Discharge Destination Plan[de-identified] Home

## 2022-07-26 ENCOUNTER — PATIENT OUTREACH (OUTPATIENT)
Dept: FAMILY MEDICINE CLINIC | Facility: CLINIC | Age: 77
End: 2022-07-26

## 2022-07-26 NOTE — PROGRESS NOTES
OPCM ADRIANNE received referral from PCP office requesting assistance with in home services  Patient was in the ED yesterday and d/c to home  SW contacted patient who reports that she bathes and dresses independently  Patient reports using a rolling walker and cane to ambulate  Patient states that she lives in a single level apartment with 4 steps to enter  Son, Sammie Hale, resides with patient and assists patient as needed  Patient reports that she continues to prepare meals and completes housekeeping  Patient declined needing assistance in the home except for possibly with housekeeping  Patient reports that her income includes SS $1900 a month and a small pension of $327 a month  Patient reports that she believes that she has less than $8000 in assets  Patient made aware that she would qualify for waiver services based on the financial guidelines  Patient would be willing to apply for waiver services if they would help with the housekeeping responsibilities  Patient was accompanied by daughter Leonora Rachel in the ED and patient provided verbal permission for this SW to contact daughter to discuss any questions that she may have after speaking with the [facility]  OPCM ADRIANNE spoke with Leonora Rachel who is unsure if patient would qualify for waiver  ADRIANNE advised that per discussion with patient today, patient would qualify for these services  ADRIANNE outlined the process for the referral, intake, and functional assessment  D[facility] when she is off  Daughter confirms that patient does not have funds to pay privately for services  ADRIANNE advised daughter that per [facility] note, Aging referral was placed and patient and/or daughter should be receiving a phone call from a   Daughter declined any further needs and will begin the waiver process with IEB      Patient and daughter were provided with private duty resources as well as brochures for [employer] for [employer] by [facility]  Patient and daughter have this SW contact information for future needs

## 2022-08-09 ENCOUNTER — OFFICE VISIT (OUTPATIENT)
Dept: FAMILY MEDICINE CLINIC | Facility: CLINIC | Age: 77
End: 2022-08-09
Payer: COMMERCIAL

## 2022-08-09 VITALS
HEART RATE: 72 BPM | BODY MASS INDEX: 35.73 KG/M2 | WEIGHT: 182 LBS | TEMPERATURE: 98.4 F | DIASTOLIC BLOOD PRESSURE: 80 MMHG | HEIGHT: 60 IN | SYSTOLIC BLOOD PRESSURE: 120 MMHG

## 2022-08-09 DIAGNOSIS — M54.50 LUMBAR BACK PAIN: ICD-10-CM

## 2022-08-09 DIAGNOSIS — E66.01 OBESITY, MORBID (HCC): ICD-10-CM

## 2022-08-09 DIAGNOSIS — R94.31 QT PROLONGATION: ICD-10-CM

## 2022-08-09 DIAGNOSIS — Z00.00 MEDICARE ANNUAL WELLNESS VISIT, SUBSEQUENT: Primary | ICD-10-CM

## 2022-08-09 DIAGNOSIS — E11.9 TYPE 2 DIABETES MELLITUS WITHOUT COMPLICATION, WITHOUT LONG-TERM CURRENT USE OF INSULIN (HCC): ICD-10-CM

## 2022-08-09 DIAGNOSIS — F51.01 PRIMARY INSOMNIA: ICD-10-CM

## 2022-08-09 DIAGNOSIS — R26.81 GAIT INSTABILITY: ICD-10-CM

## 2022-08-09 DIAGNOSIS — I63.9 CEREBROVASCULAR ACCIDENT (CVA), UNSPECIFIED MECHANISM (HCC): ICD-10-CM

## 2022-08-09 DIAGNOSIS — F11.20 CONTINUOUS OPIOID DEPENDENCE (HCC): ICD-10-CM

## 2022-08-09 DIAGNOSIS — E03.9 HYPOTHYROIDISM, UNSPECIFIED TYPE: ICD-10-CM

## 2022-08-09 LAB — SL AMB POCT HEMOGLOBIN AIC: 6.8 (ref ?–6.5)

## 2022-08-09 PROCEDURE — 1090F PRES/ABSN URINE INCON ASSESS: CPT | Performed by: NURSE PRACTITIONER

## 2022-08-09 PROCEDURE — 1125F AMNT PAIN NOTED PAIN PRSNT: CPT | Performed by: NURSE PRACTITIONER

## 2022-08-09 PROCEDURE — 83036 HEMOGLOBIN GLYCOSYLATED A1C: CPT | Performed by: NURSE PRACTITIONER

## 2022-08-09 PROCEDURE — 1170F FXNL STATUS ASSESSED: CPT | Performed by: NURSE PRACTITIONER

## 2022-08-09 PROCEDURE — 1160F RVW MEDS BY RX/DR IN RCRD: CPT | Performed by: NURSE PRACTITIONER

## 2022-08-09 PROCEDURE — 99215 OFFICE O/P EST HI 40 MIN: CPT | Performed by: NURSE PRACTITIONER

## 2022-08-09 PROCEDURE — 3725F SCREEN DEPRESSION PERFORMED: CPT | Performed by: NURSE PRACTITIONER

## 2022-08-09 PROCEDURE — 3288F FALL RISK ASSESSMENT DOCD: CPT | Performed by: NURSE PRACTITIONER

## 2022-08-09 PROCEDURE — 93000 ELECTROCARDIOGRAM COMPLETE: CPT | Performed by: NURSE PRACTITIONER

## 2022-08-09 PROCEDURE — 1111F DSCHRG MED/CURRENT MED MERGE: CPT | Performed by: NURSE PRACTITIONER

## 2022-08-09 PROCEDURE — G0439 PPPS, SUBSEQ VISIT: HCPCS | Performed by: NURSE PRACTITIONER

## 2022-08-09 RX ORDER — ZOLPIDEM TARTRATE 10 MG/1
5 TABLET ORAL
Qty: 30 TABLET | Refills: 0 | Status: SHIPPED | OUTPATIENT
Start: 2022-08-09 | End: 2022-08-10 | Stop reason: SDUPTHER

## 2022-08-09 RX ORDER — BACLOFEN 10 MG/1
TABLET ORAL
Qty: 90 TABLET | Refills: 4 | Status: SHIPPED | OUTPATIENT
Start: 2022-08-09

## 2022-08-09 RX ORDER — ASPIRIN 81 MG/1
81 TABLET, CHEWABLE ORAL DAILY
Qty: 30 TABLET | Refills: 0
Start: 2022-08-09 | End: 2022-10-13

## 2022-08-09 RX ORDER — LEVOTHYROXINE SODIUM 0.03 MG/1
37.5 TABLET ORAL DAILY
Qty: 45 TABLET | Refills: 4 | Status: SHIPPED | OUTPATIENT
Start: 2022-08-09 | End: 2022-10-13

## 2022-08-09 NOTE — PROGRESS NOTES
Assessment and Plan:     Problem List Items Addressed This Visit        Endocrine    Type 2 diabetes mellitus ([facility] )    Relevant Orders    POCT hemoglobin A1c (Completed)    Hypothyroidism    Relevant Medications    levothyroxine 25 mcg tablet       Cardiovascular and Mediastinum    Cerebrovascular accident (CVA), unspecified mechanism ([facility] )    Relevant Medications    aspirin 81 mg chewable tablet       Other    Obesity, morbid ([facility] )    Continuous opioid dependence ([facility] )      Other Visit Diagnoses     Medicare annual wellness visit, subsequent    -  Primary    Gait instability        Relevant Orders    Ambulatory Referral to Physical Therapy    QT prolongation        Relevant Orders    POCT ECG (Completed)    Primary insomnia        Lumbar back pain        Relevant Medications    baclofen 10 mg tablet        #1 Medicare annual wellness visit, subsequent  #2 Type 2 diabetes mellitus without complication, without long-term current use of insulin (HCC)  Stable with A1c of 6 8 - continue on current medication and recheck in 4-6 months  #3 Gait instability  Discuss with patient and her daughters plan to refer to physical therapy for gait therapy  #4 QT prolongation  Point of ECG performed with return to patient's baseline noted  #5 Cerebrovascular accident, unspecified mechanism [facility])  Patient having no residual effects and has a neurology appointment scheduled for 10/25/2022 with neurology not letting her cancel  #6 Continuous opioid dependence ([facility] )  Opioid pain agreement resigned - unable to complete full opioid management paperwork and obtain urine drug screening - plan to complete at next office visit  #7 Obesity, morbid ([facility] )  Patient is physically deconditioned so will need therapy to be able to increase activity level  #8 Primary insomnia  Stable - plan to continue on current medication and recheck in 4-6 months  #9 Lumbar back pain  Chronic but stable - plan to continue on current medication and recheck in 4-6 months  #10 Hypothyroidism, unspecified type  Stable - continue on current medication and dosage - will recheck in 4-6 months  Patient instructed to return in 4-6 months dependent on transportation or sooner if needed  Patient instructed to call office for problems or concerns in the interim    BMI Counseling: Body mass index is 35 54 kg/m²  The BMI is above normal  Nutrition recommendations include decreasing portion sizes, encouraging healthy choices of fruits and vegetables, consuming healthier snacks, moderation in carbohydrate intake and increasing intake of lean protein  Exercise recommendations include exercising 3-5 times per week and strength training exercises  Rationale for BMI follow-up plan is due to patient being overweight or obese  Depression Screening and Follow-up Plan: Patient was screened for depression during today's encounter  They screened negative with a PHQ-2 score of 0  Falls Plan of Care: balance, strength, and gait training instructions were provided and referral to physical therapy  Recommended assistive device to help with gait and balance  Assessed feet and footwear  Home safety education provided  Preventive health issues were discussed with patient, and age appropriate screening tests were ordered as noted in patient's After Visit Summary  Personalized health advice and appropriate referrals for health education or preventive services given if needed, as noted in patient's After Visit Summary  History of Present Illness:     Patient presents for a Medicare Wellness Visit as well as a emergency room follow-up  A routine follow-up for some chronic medical conditions  Patient is in need for a opoid management follow-up but do not have time for paperwork completion and drug screen specimen collection  76 y  o female presenting for an emergency room follow-up S/P fall as well as her Medicare annual wellness visit   Patient reports that she has been feeling more off balanced even using her walker  She was seen in the emergency room at [address] [place] [address] after reaching for her walker  She reports that the walker was not completed unfolded which caused the walker to tilt and throwing her off balance causing the fall  She did struck her head against some kitchen cabinet when she fall  Her son heard her fall and called EMS to transported her to the hospital  She denies any chest pain, shortness of breath, dizziness, palpitations or other symptoms preceding the fall  She reports that she know now to make sure the walker is completely unfolded prior to using  Watching the patient ambulate into the exam room she appears off balance along with her usual antalgic gait 2/2 chronic spine issues  Patient needs her eldest daughter to transport her to office visit so as much of rouitne care provided today  Patient Care Team:  Mary Irizarry as PCP - General (Family Medicine)  MD Adam Vergara DO Penelope Mckusick, DO Jaime Fury, MD Johnye Morocco, MD as Endoscopist     Review of Systems:     Review of Systems   Constitutional: Negative for activity change, appetite change and unexpected weight change  HENT: Negative for dental problem, ear pain, hearing loss, nosebleeds, sneezing, sore throat, tinnitus and trouble swallowing  Eyes: Negative for visual disturbance  Respiratory: Negative for cough, chest tightness, shortness of breath and wheezing  Cardiovascular: Negative for chest pain, palpitations and leg swelling  Gastrointestinal: Negative for abdominal distention, abdominal pain, constipation, diarrhea and nausea  Endocrine: Negative for polydipsia and polyuria  Genitourinary: Negative  Musculoskeletal: Positive for gait problem and myalgias ( left side under bra line)  Negative for arthralgias, back pain and neck pain  Skin: Negative for color change and rash     Allergic/Immunologic: Negative for environmental allergies  Neurological: Negative for dizziness, weakness, light-headedness and headaches  Hematological: Negative  Psychiatric/Behavioral: Negative  Negative for dysphoric mood and sleep disturbance  The patient is not nervous/anxious           Problem List:     Patient Active Problem List   Diagnosis    Failed back surgical syndrome    Degenerative lumbar spinal stenosis    Type 2 diabetes mellitus (HCC)    Chronic pain disorder    Dyspepsia    Glaucoma    Hypertension    Iron deficiency anemia secondary to inadequate dietary iron intake    Anxiety    Hypercholesterolemia    Hypothyroidism    Overactive bladder    Mild intermittent asthma without complication    Prepyloric ulcer    Obesity, morbid ([facility] )    Ambulatory dysfunction    [facility]    Cerebrovascular accident (CVA), unspecified mechanism ([facility] )      Past Medical and Surgical History:     Past Medical History:   Diagnosis Date    Acid reflux     Anemia     hx of iron-deficient    Anxiety     Arthritis     Asthma     last needed inhaler last year 2020    Basal cell carcinoma     upper lip    Chronic narcotic dependence (HCC)     Chronic pain     Colon polyp     Cystocele     Diabetes mellitus ([facility] )     stable    Disease of thyroid gland     hypothyroidism    Diverticulosis     Dysfunctional uterine bleeding     last assessed - [id number]    Fibromyalgia     Gastric ulcer     Gastroparesis     History of colonic polyps     last assessed - [id number]    History of gastroesophageal reflux (GERD)     Hypercholesterolemia     Hyperlipidemia     Hypertension     IBS (irritable bowel syndrome)     Pneumobilia 6/18/2022    Post laminectomy syndrome     S/P insertion of spinal cord stimulator 7/18/2018    Seasonal allergies     Spinal stenosis     Status post lumbar spinal fusion 3/16/2018     Past Surgical History:   Procedure Laterality Date    APPENDECTOMY      BACK SURGERY      BREAST CYST EXCISION Left     CHOLECYSTECTOMY      COLONOSCOPY      ESOPHAGOGASTRODUODENOSCOPY N/A 09/28/2016    Procedure: ESOPHAGOGASTRODUODENOSCOPY (EGD); Surgeon: Kassie Mcallister MD;  [facility]; Service:     HERNIA REPAIR      HYSTERECTOMY      TTAH-BSO age 25   Wilver Lemme LAMINECTOMY      LUMBAR LAMINECTOMY      OOPHORECTOMY      age 25   Wilver Lemme IN ARTHRODESIS POSTERIOR/POSTEROLATERAL THORACIC N/A 06/04/2018    Procedure: Reopening of lumbar incision for T12-L5 posterior instrumented fixation and fusion and T12-L4 posterior decompression;  Surgeon: Tim Cerda MD;  [facility];  Service: Neurosurgery    IN COLONOSCOPY FLX DX W/COLLJ SPEC WHEN PFRMD N/A 03/02/2016    Procedure: EGD AND COLONOSCOPY;  Surgeon: Kassie Mcallister MD;  [facility]; Service: Gastroenterology    IN DILATE ESOPHAGUS N/A 09/28/2016    Procedure: DILATATION ESOPHAGEAL;  Surgeon: Kassie Mcallister MD;  [facility]; Service: Gastroenterology    IN ESOPHAGOGASTRODUODENOSCOPY TRANSORAL DIAGNOSTIC N/A 07/18/2016    Procedure: ESOPHAGOGASTRODUODENOSCOPY (EGD); Surgeon: Kassie Mcallister MD;  [facility];   Service: Gastroenterology    IN IMPLANT SPINAL NEUROSTIM/ Left 06/04/2018    Procedure: removal of left buttock implantable pulse generator and placement of new  implantable pulse generator;  Surgeon: Tim Cerda MD;  [facility];  Service: Neurosurgery      Family History:     Family History   Problem Relation Age of Onset    Lung cancer Mother 55    Pulmonary embolism Father     No Known Problems Sister     No Known Problems Daughter     No Known Problems Daughter     Stroke Maternal Grandmother     Heart attack Maternal Grandfather     No Known Problems Paternal Grandmother     No Known Problems Paternal Grandfather     No Known Problems Maternal Aunt     Diabetes Family         Diabetes mellitus    Hypertension Family     Stroke Family         Stroke complications      Social History:     Social History     Socioeconomic History    Marital status:      Spouse name: None    Number of children: None    Years of education: None    Highest education level: None   Occupational History    Occupation: Retired   Tobacco Use    Smoking status: Former Smoker     Types: Cigarettes     Quit date: 1970     Years since quittin 6    Smokeless tobacco: Never Used    Tobacco comment: Denied history of current ever day smoker, Former smoker and Never smoker all documented in [employer]   Vaping Use    Vaping Use: Never used   Substance and Sexual Activity    Alcohol use: Not Currently     Comment: Denied history of alcohol use    Drug use: No     Comment: Denied history of drug use    Sexual activity: Not Currently   Other Topics Concern    None   Social History Narrative    Marital History - Currently  per [employer]     Social Determinants of Health     Financial Resource Strain: Medium Risk    Difficulty of Paying Living Expenses: Somewhat hard   Food Insecurity: No Food Insecurity    Worried About Running Out of Food in the Last Year: Never true    Naila of Food in the Last Year: Never true   Transportation Needs: No Transportation Needs    Lack of Transportation (Medical): No    Lack of Transportation (Non-Medical):  No   Physical Activity: Not on file   Stress: Not on file   Social Connections: Not on file   Intimate Partner Violence: Not on file   Housing Stability: Low Risk     Unable to Pay for Housing in the Last Year: No    Number of Places Lived in the Last Year: 1    Unstable Housing in the Last Year: No      Medications and Allergies:     Current Outpatient Medications   Medication Sig Dispense Refill    albuterol (PROVENTIL HFA,VENTOLIN HFA) 90 mcg/act inhaler Inhale 2 puffs every 6 (six) hours as needed for wheezing or shortness of breath 6 7 g 5    aspirin 81 mg chewable tablet Chew 1 tablet (81 mg total) daily 30 tablet 0    atorvastatin (LIPITOR) 40 mg tablet Take 1 tablet (40 mg total) by mouth daily 90 tablet 1    baclofen 10 mg tablet Take one tablet by mouth three times a day as needed for muscle spasms 90 tablet 4    escitalopram (Lexapro) 20 mg tablet Take 0 5 tablets (10 mg total) by mouth daily 30 tablet 5    famotidine (PEPCID) 20 mg tablet Take 1 tablet (20 mg total) by mouth every 12 (twelve) hours 60 tablet 1    gabapentin (NEURONTIN) 300 mg capsule Take 1 capsule (300 mg total) by mouth 2 (two) times a day 60 capsule 5    glucose blood test strip Bid testing 100 each 6    levothyroxine 25 mcg tablet Take 1 5 tablets (37 5 mcg total) by mouth daily 45 tablet 4    lisinopril (ZESTRIL) 20 mg tablet Take 1 tablet (20 mg total) by mouth daily 30 tablet 0    magnesium Oxide (MAG-OX) 400 mg TABS Take 1 tablet (400 mg total) by mouth 2 (two) times a day 30 tablet 0    metFORMIN (GLUCOPHAGE) 1000 MG tablet Take 1 tablet (1,000 mg total) by mouth 2 (two) times a day with meals 180 tablet 3    metoclopramide (REGLAN) 10 mg tablet Take 1 tablet (10 mg total) by mouth 4 (four) times a day as needed (Nausea and vomiting) 120 tablet 0    metoprolol tartrate (LOPRESSOR) 25 mg tablet Take 0 5 tablets (12 5 mg total) by mouth every 12 (twelve) hours 45 tablet 3    Milnacipran HCl (Savella) 100 MG TABS Take 1 tablet (100 mg total) by mouth daily 30 tablet 3    nystatin (MYCOSTATIN) powder Apply topically 2 (two) times a day 15 g 0    oxyCODONE-acetaminophen (PERCOCET)  mg per tablet Take 1 tablet by mouth every 6 (six) hours as needed for severe pain Max Daily Amount: 4 tablets 100 tablet 0    pantoprazole (PROTONIX) 40 mg tablet Take 1 tablet (40 mg total) by mouth every 12 (twelve) hours 180 tablet 0    saccharomyces boulardii (FLORASTOR) 250 mg capsule Take 1 capsule (250 mg total) by mouth 2 (two) times a day 90 capsule 0    sucralfate (CARAFATE) 1 g tablet Take 1 tablet (1 g total) by mouth 4 (four) times a day 120 tablet 0    trospium chloride (SANCTURA) 20 mg tablet Take 1 tablet (20 mg total) by mouth 2 (two) times a day 90 tablet 3    latanoprost (XALATAN) 0 005 % ophthalmic solution Administer 1 drop to both eyes daily at bedtime 2 5 mL 3    meclizine (ANTIVERT) 25 mg tablet Take 1 tablet (25 mg total) by mouth 4 (four) times a day as needed for dizziness 60 tablet 0    zolpidem (AMBIEN) 10 mg tablet Take 0 5 tablets (5 mg total) by mouth daily at bedtime as needed for sleep 30 tablet 0     No current facility-administered medications for this visit       Allergies   Allergen Reactions    Penicillins Anaphylaxis, Hives and Other (See Comments)     Other reaction(s): Unknown Reaction    Sulfa Antibiotics Anaphylaxis and Other (See Comments)     Other reaction(s): Unknown Reaction    Aspartame - Food Allergy Other (See Comments) and Hypertension     Slurred speech, weakness, stroke sx    Iodinated Diagnostic Agents Hives     Pt has taken prep prior for contrast and has not had any break through reaction    Keflex [Cephalexin] Hives    Levaquin [Levofloxacin] Rash      Immunizations:     Immunization History   Administered Date(s) Administered    COVID-19 PFIZER VACCINE 0 3 ML IM 01/13/2021, 02/03/2021, 10/30/2021    INFLUENZA 12/15/2021    Influenza Split High Dose Preservative Free IM 10/21/2011, 10/23/2013, 09/28/2015, 10/31/2016, 10/02/2017    Influenza, high dose seasonal 0 7 mL 10/25/2018, 10/11/2019, 10/07/2020, 12/15/2021    Influenza, seasonal, injectable 10/16/2007, 11/16/2008, 10/17/2009    Pneumococcal Conjugate 13-Valent 10/31/2016    Pneumococcal Polysaccharide PPV23 04/17/2015    TD (adult) Preservative Free 03/21/2000    Td (adult), adsorbed 03/11/2010, 05/05/2017    Tdap 04/04/2000    Tuberculin Skin Test-PPD Intradermal 04/15/2015, 05/01/2015, 10/31/2016, 11/07/2016      Health Maintenance:         Topic Date Due    Hepatitis C Screening  Never done    Colorectal Cancer Screening  07/22/2023         Topic Date Due    Pneumococcal Vaccine: 65+ Years (3 - PPSV23 or PCV20) 04/17/2020    COVID-19 Vaccine (4 - Booster for Pfizer series) 02/28/2022    Influenza Vaccine (1) 09/01/2022      Medicare Screening Tests and Risk Assessments: Edith Diaz is here for her Subsequent Wellness visit  Last Medicare Wellness visit information reviewed, patient interviewed and updates made to the record today  Health Risk Assessment:   Patient rates overall health as fair  Patient feels that their physical health rating is slightly worse  Patient is satisfied with their life  Eyesight was rated as same  Hearing was rated as same  Patient feels that their emotional and mental health rating is same  Patients states they are never, rarely angry  Patient states they are sometimes unusually tired/fatigued  Pain experienced in the last 7 days has been a lot  Patient's pain rating has been 9/10  Patient states that she has experienced no weight loss or gain in last 6 months  Depression Screening:   PHQ-2 Score: 0  PHQ-9 Score: 0      Fall Risk Screening: In the past year, patient has experienced: history of falling in past year    Number of falls: 2 or more  Injured during fall?: Yes    Feels unsteady when standing or walking?: Yes    Worried about falling?: Yes      Urinary Incontinence Screening:   Patient has leaked urine accidently in the last six months  Home Safety:  Patient has trouble with stairs inside or outside of their home  Patient has working smoke alarms and has no working carbon monoxide detector  Home safety hazards include: none  Nutrition:   Current diet is Regular  Medications:   Patient is currently taking over-the-counter supplements  OTC medications include: see medication list  Patient is not able to manage medications       Activities of Daily Living (ADLs)/Instrumental Activities of Daily Living (IADLs):   Walk and transfer into and out of bed and chair?: Yes  Dress and groom yourself?: Yes    Bathe or shower yourself?: Yes    Feed yourself? Yes  Do your laundry/housekeeping?: No  Manage your money, pay your bills and track your expenses?: No  Make your own meals?: Yes    Do your own shopping?: No    Previous Hospitalizations:   Any hospitalizations or ED visits within the last 12 months?: Yes      Advance Care Planning:   Living will: Yes    Durable POA for healthcare: Yes    Advanced directive: Yes      Cognitive Screening:   Provider or family/friend/caregiver concerned regarding cognition?: Yes    PREVENTIVE SCREENINGS      Cardiovascular Screening:    General: Screening Not Indicated and History Lipid Disorder      Diabetes Screening:     General: Screening Not Indicated and History Diabetes      Colorectal Cancer Screening:     General: Screening Current      Breast Cancer Screening:     General: Screening Current      Cervical Cancer Screening:    General: Screening Not Indicated      Osteoporosis Screening:    General: Patient Declines      Abdominal Aortic Aneurysm (AAA) Screening:        General: Screening Not Indicated      Lung Cancer Screening:     General: Screening Not Indicated      Hepatitis C Screening:    General: Screening Not Indicated    Screening, Brief Intervention, and Referral to Treatment (SBIRT)    Screening  Typical number of drinks in a day: 0  Typical number of drinks in a week: 0  Interpretation: Low risk drinking behavior      AUDIT-C Screenin) How often did you have a drink containing alcohol in the past year? never  2) How many drinks did you have on a typical day when you were drinking in the past year? 0  3) How often did you have 6 or more drinks on one occasion in the past year? never    AUDIT-C Score: 0  Interpretation: Score 0-2 (female): Negative screen for alcohol misuse    Single Item Drug Screening:  How often have you used an illegal drug (including marijuana) or a prescription medication for non-medical reasons in the past year? never    Single Item Drug Screen Score: 0  Interpretation: Negative screen for possible drug use disorder    Review of Current Opioid Use    Opioid Risk Tool (ORT) Interpretation: Score 0-3: Low risk for opioid misuse    [place]MP or [place]  reviewed  No red flags were identified    Other Counseling Topics:   Calcium and vitamin D intake and regular weightbearing exercise  No exam data present     Physical Exam:     /80   Pulse 72   Temp 98 4 °F (36 9 °C)   Ht 5' (1 524 m)   Wt 82 6 kg (182 lb)   LMP  (LMP Unknown)   BMI 35 54 kg/m² (Reviewed)    Physical Exam  Vitals reviewed  Constitutional:       General: She is not in acute distress  Appearance: Normal appearance  She is well-developed and well-groomed  She is not ill-appearing  HENT:      Head: Normocephalic and atraumatic  Right Ear: Tympanic membrane, ear canal and external ear normal       Left Ear: Tympanic membrane, ear canal and external ear normal       Nose: Nose normal       Mouth/Throat:      Lips: Pink  Mouth: Mucous membranes are moist       Pharynx: Oropharynx is clear  Eyes:      General: Lids are normal       Extraocular Movements: Extraocular movements intact  Conjunctiva/sclera: Conjunctivae normal       Pupils: Pupils are equal, round, and reactive to light  Neck:      Thyroid: No thyromegaly  Trachea: Trachea normal    Cardiovascular:      Rate and Rhythm: Normal rate and regular rhythm  Pulses: no weak pulses          Radial pulses are 2+ on the right side and 2+ on the left side  Dorsalis pedis pulses are 2+ on the right side and 2+ on the left side  Posterior tibial pulses are 2+ on the right side and 2+ on the left side  Heart sounds: Normal heart sounds  No murmur heard  No friction rub  No gallop  Pulmonary:      Effort: Pulmonary effort is normal       Breath sounds: Normal breath sounds  Abdominal:      General: Abdomen is flat   Bowel sounds are normal  There is no distension  Palpations: Abdomen is soft  There is no mass  Tenderness: There is no abdominal tenderness  Musculoskeletal:         General: Normal range of motion  Cervical back: Normal range of motion and neck supple  Right lower leg: No edema  Left lower leg: No edema  Feet:      Right foot:      Skin integrity: No ulcer, skin breakdown, erythema, warmth, callus or dry skin  Left foot:      Skin integrity: No ulcer, skin breakdown, erythema, warmth, callus or dry skin  Lymphadenopathy:      Cervical: No cervical adenopathy  Skin:     General: Skin is warm and dry  Capillary Refill: Capillary refill takes less than 2 seconds  Neurological:      General: No focal deficit present  Mental Status: She is alert and oriented to person, place, and time  Cranial Nerves: Cranial nerves are intact  Motor: Motor function is intact  Gait: Gait abnormal    Psychiatric:         Mood and Affect: Mood normal          Behavior: Behavior normal  Behavior is cooperative  Patient's shoes and socks removed  Right Foot/Ankle   Right Foot Inspection  Skin Exam: skin normal and skin intact  No dry skin, no warmth, no callus, no erythema, no maceration, no abnormal color, no pre-ulcer, no ulcer and no callus  Toe Exam: ROM and strength within normal limits  Sensory   Proprioception: diminished  Monofilament testing: diminished    Vascular  Capillary refills: < 3 seconds  The right DP pulse is 2+  The right PT pulse is 2+  Left Foot/Ankle  Left Foot Inspection  Skin Exam: skin normal and skin intact  No dry skin, no warmth, no erythema, no maceration, normal color, no pre-ulcer, no ulcer and no callus  Toe Exam: ROM and strength within normal limits  Sensory   Proprioception: diminished  Monofilament testing: diminished    Vascular  Capillary refills: < 3 seconds  The left DP pulse is 2+  The left PT pulse is 2+       Assign Risk Category  No deformity present  Loss of protective sensation  No weak pulses  Risk: [id number]

## 2022-08-09 NOTE — PATIENT INSTRUCTIONS
Medicare Preventive Visit Patient Instructions  Thank you for completing your Welcome to Medicare Visit or Medicare Annual Wellness Visit today  Your next wellness visit will be due in one year (8/10/2023)  The screening/preventive services that you may require over the next 5-10 years are detailed below  Some tests may not apply to you based off risk factors and/or age  Screening tests ordered at today's visit but not completed yet may show as past due  Also, please note that scanned in results may not display below  Preventive Screenings:  Service Recommendations Previous Testing/Comments   Colorectal Cancer Screening  * Colonoscopy    * Fecal Occult Blood Test (FOBT)/Fecal Immunochemical Test (FIT)  * Fecal DNA/Cologuard Test  * Flexible Sigmoidoscopy Age: 39-70 years old   Colonoscopy: every 10 years (may be performed more frequently if at higher risk)  OR  FOBT/FIT: every 1 year  OR  Cologuard: every 3 years  OR  Sigmoidoscopy: every 5 years  Screening may be recommended earlier than age 39 if at higher risk for colorectal cancer  Also, an individualized decision between you and your healthcare provider will decide whether screening between the ages of 74-80 would be appropriate  Colonoscopy: 07/22/2021  FOBT/FIT: Not on file  Cologuard: Not on file  Sigmoidoscopy: Not on file    Screening Current     Breast Cancer Screening Age: 36 years old  Frequency: every 1-2 years  Not required if history of left and right mastectomy Mammogram: 05/25/2022    Screening Current   Cervical Cancer Screening Between the ages of 21-29, pap smear recommended once every 3 years  Between the ages of 33-67, can perform pap smear with HPV co-testing every 5 years     Recommendations may differ for women with a history of total hysterectomy, cervical cancer, or abnormal pap smears in past  Pap Smear: Not on file    Screening Not Indicated   Hepatitis C Screening Once for adults born between 1945 and 1965  More frequently in patients at high risk for Hepatitis C Hep C Antibody: Not on file        Diabetes Screening 1-2 times per year if you're at risk for diabetes or have pre-diabetes Fasting glucose: 150 mg/dL (4/23/2022)  A1C: 8 6 % (4/22/2022)  Screening Not Indicated  History Diabetes   Cholesterol Screening Once every 5 years if you don't have a lipid disorder  May order more often based on risk factors  Lipid panel: 04/23/2022    Screening Not Indicated  History Lipid Disorder     Other Preventive Screenings Covered by Medicare:  1  Abdominal Aortic Aneurysm (AAA) Screening: covered once if your at risk  You're considered to be at risk if you have a family history of AAA  2  Lung Cancer Screening: covers low dose CT scan once per year if you meet all of the following conditions: (1) Age 50-69; (2) No signs or symptoms of lung cancer; (3) Current smoker or have quit smoking within the last 15 years; (4) You have a tobacco smoking history of at least 20 pack years (packs per day multiplied by number of years you smoked); (5) You get a written order from a healthcare provider  3  Glaucoma Screening: covered annually if you're considered high risk: (1) You have diabetes OR (2) Family history of glaucoma OR (3)  aged 48 and older OR (3)  American aged 72 and older  3  Osteoporosis Screening: covered every 2 years if you meet one of the following conditions: (1) You're estrogen deficient and at risk for osteoporosis based off medical history and other findings; (2) Have a vertebral abnormality; (3) On glucocorticoid therapy for more than 3 months; (4) Have primary hyperparathyroidism; (5) On osteoporosis medications and need to assess response to drug therapy  · Last bone density test (DXA Scan): Not on file  5  HIV Screening: covered annually if you're between the age of 12-76  Also covered annually if you are younger than 13 and older than 72 with risk factors for HIV infection   For pregnant patients, it is covered up to 3 times per pregnancy  Immunizations:  Immunization Recommendations   Influenza Vaccine Annual influenza vaccination during flu season is recommended for all persons aged >= 6 months who do not have contraindications   Pneumococcal Vaccine   * Pneumococcal conjugate vaccine = PCV13 (Prevnar 13), PCV15 (Vaxneuvance), PCV20 (Prevnar 20)  * Pneumococcal polysaccharide vaccine = PPSV23 (Pneumovax) Adults 25-60 years old: 1-3 doses may be recommended based on certain risk factors  Adults 72 years old: 1-2 doses may be recommended based off what pneumonia vaccine you previously received   Hepatitis B Vaccine 3 dose series if at intermediate or high risk (ex: diabetes, end stage renal disease, liver disease)   Tetanus (Td) Vaccine - COST NOT COVERED BY MEDICARE PART B Following completion of primary series, a booster dose should be given every 10 years to maintain immunity against tetanus  Td may also be given as tetanus wound prophylaxis  Tdap Vaccine - COST NOT COVERED BY MEDICARE PART B Recommended at least once for all adults  For pregnant patients, recommended with each pregnancy  Shingles Vaccine (Shingrix) - COST NOT COVERED BY MEDICARE PART B  2 shot series recommended in those aged 48 and above     Health Maintenance Due:      Topic Date Due    Hepatitis C Screening  Never done    Colorectal Cancer Screening  07/22/2023     Immunizations Due:      Topic Date Due    Pneumococcal Vaccine: 65+ Years (3 - PPSV23 or PCV20) 04/17/2020    COVID-19 Vaccine (4 - Booster for Pfizer series) 02/28/2022    Influenza Vaccine (1) 09/01/2022     Advance Directives   What are advance directives? Advance directives are legal documents that state your wishes and plans for medical care  These plans are made ahead of time in case you lose your ability to make decisions for yourself  Advance directives can apply to any medical decision, such as the treatments you want, and if you want to donate organs  What are the types of advance directives? There are many types of advance directives, and each state has rules about how to use them  You may choose a combination of any of the following:  · Living will: This is a written record of the treatment you want  You can also choose which treatments you do not want, which to limit, and which to stop at a certain time  This includes surgery, medicine, IV fluid, and tube feedings  · Durable power of  for healthcare [facility]): This is a written record that states who you want to make healthcare choices for you when you are unable to make them for yourself  This person, called a proxy, is usually a family member or a friend  You may choose more than 1 proxy  · Do not resuscitate (DNR) order:  A DNR order is used in case your heart stops beating or you stop breathing  It is a request not to have certain forms of treatment, such as CPR  A DNR order may be included in other types of advance directives  · Medical directive: This covers the care that you want if you are in a coma, near death, or unable to make decisions for yourself  You can list the treatments you want for each condition  Treatment may include pain medicine, surgery, blood transfusions, dialysis, IV or tube feedings, and a ventilator (breathing machine)  · Values history: This document has questions about your views, beliefs, and how you feel and think about life  This information can help others choose the care that you would choose  Why are advance directives important? An advance directive helps you control your care  Although spoken wishes may be used, it is better to have your wishes written down  Spoken wishes can be misunderstood, or not followed  Treatments may be given even if you do not want them  An advance directive may make it easier for your family to make difficult choices about your care     Urinary Incontinence   Urinary incontinence (UI)  is when you lose control of your bladder  UI develops because your bladder cannot store or empty urine properly  The 3 most common types of UI are stress incontinence, urge incontinence, or both  Medicines:   · May be given to help strengthen your bladder control  Report any side effects of medication to your healthcare provider  Do pelvic muscle exercises often:  Your pelvic muscles help you stop urinating  Squeeze these muscles tight for 5 seconds, then relax for 5 seconds  Gradually work up to squeezing for 10 seconds  Do 3 sets of 15 repetitions a day, or as directed  This will help strengthen your pelvic muscles and improve bladder control  Train your bladder:  Go to the bathroom at set times, such as every 2 hours, even if you do not feel the urge to go  You can also try to hold your urine when you feel the urge to go  For example, hold your urine for 5 minutes when you feel the urge to go  As that becomes easier, hold your urine for 10 minutes  Self-care:   · Keep a UI record  Write down how often you leak urine and how much you leak  Make a note of what you were doing when you leaked urine  · Drink liquids as directed  You may need to limit the amount of liquid you drink to help control your urine leakage  Do not drink any liquid right before you go to bed  Limit or do not have drinks that contain caffeine or alcohol  · Prevent constipation  Eat a variety of high-fiber foods  Good examples are high-fiber cereals, beans, vegetables, and whole-grain breads  Walking is the best way to trigger your intestines to have a bowel movement  · Exercise regularly and maintain a healthy weight  Weight loss and exercise will decrease pressure on your bladder and help you control your leakage  · Use a catheter as directed  to help empty your bladder  A catheter is a tiny, plastic tube that is put into your bladder to drain your urine  · Go to behavior therapy as directed    Behavior therapy may be used to help you learn to control your urge to urinate  Weight Management   Why it is important to manage your weight:  Being overweight increases your risk of health conditions such as heart disease, high blood pressure, type 2 diabetes, and certain types of cancer  It can also increase your risk for osteoarthritis, sleep apnea, and other respiratory problems  Aim for a slow, steady weight loss  Even a small amount of weight loss can lower your risk of health problems  How to lose weight safely:  A safe and healthy way to lose weight is to eat fewer calories and get regular exercise  You can lose up about 1 pound a week by decreasing the number of calories you eat by 500 calories each day  Healthy meal plan for weight management:  A healthy meal plan includes a variety of foods, contains fewer calories, and helps you stay healthy  A healthy meal plan includes the following:  · Eat whole-grain foods more often  A healthy meal plan should contain fiber  Fiber is the part of grains, fruits, and vegetables that is not broken down by your body  Whole-grain foods are healthy and provide extra fiber in your diet  Some examples of whole-grain foods are whole-wheat breads and pastas, oatmeal, brown rice, and bulgur  · Eat a variety of vegetables every day  Include dark, leafy greens such as spinach, kale, idalia greens, and mustard greens  Eat yellow and orange vegetables such as carrots, sweet potatoes, and winter squash  · Eat a variety of fruits every day  Choose fresh or canned fruit (canned in its own juice or light syrup) instead of juice  Fruit juice has very little or no fiber  · Eat low-fat dairy foods  Drink fat-free (skim) milk or 1% milk  Eat fat-free yogurt and low-fat cottage cheese  Try low-fat cheeses such as mozzarella and other reduced-fat cheeses  · Choose meat and other protein foods that are low in fat  Choose beans or other legumes such as split peas or lentils   Choose fish, skinless poultry (chicken or turkey), or lean cuts of red meat (beef or pork)  Before you cook meat or poultry, cut off any visible fat  · Use less fat and oil  Try baking foods instead of frying them  Add less fat, such as margarine, sour cream, regular salad dressing and mayonnaise to foods  Eat fewer high-fat foods  Some examples of high-fat foods include french fries, doughnuts, ice cream, and cakes  · Eat fewer sweets  Limit foods and drinks that are high in sugar  This includes candy, cookies, regular soda, and sweetened drinks  Exercise:  Exercise at least 30 minutes per day on most days of the week  Some examples of exercise include walking, biking, dancing, and swimming  You can also fit in more physical activity by taking the stairs instead of the elevator or parking farther away from stores  Ask your healthcare provider about the best exercise plan for you  Narcotic (Opioid) Safety    Use narcotics safely:  · Take prescribed narcotics exactly as directed  · Do not give narcotics to others or take narcotics that belong to someone else  · Do not mix narcotics without medicines or alcohol  · Do not drive or operate heavy machinery after you take the narcotic  · Monitor for side effects and notify your healthcare provider if you experienced side effects such as nausea, sleepiness, itching, or trouble thinking clearly  Manage constipation:    Constipation is the most common side effect of narcotic medicine  Constipation is when you have hard, dry bowel movements, or you go longer than usual between bowel movements  Tell your healthcare provider about all changes in your bowel movements while you are taking narcotics  He or she may recommend laxative medicine to help you have a bowel movement  He or she may also change the kind of narcotic you are taking, or change when you take it  The following are more ways you can prevent or relieve constipation:    · Drink liquids as directed    You may need to drink extra liquids to help soften and move your bowels  Ask how much liquid to drink each day and which liquids are best for you  · Eat high-fiber foods  This may help decrease constipation by adding bulk to your bowel movements  High-fiber foods include fruits, vegetables, whole-grain breads and cereals, and beans  Your healthcare provider or dietitian can help you create a high-fiber meal plan  Your provider may also recommend a fiber supplement if you cannot get enough fiber from food  · Exercise regularly  Regular physical activity can help stimulate your intestines  Walking is a good exercise to prevent or relieve constipation  Ask which exercises are best for you  · Schedule a time each day to have a bowel movement  This may help train your body to have regular bowel movements  Bend forward while you are on the toilet to help move the bowel movement out  Sit on the toilet for at least 10 minutes, even if you do not have a bowel movement  Store narcotics safely:   · Store narcotics where others cannot easily get them  Keep them in a locked cabinet or secure area  Do not  keep them in a purse or other bag you carry with you  A person may be looking for something else and find the narcotics  · Make sure narcotics are stored out of the reach of children  A child can easily overdose on narcotics  Narcotics may look like candy to a small child  The best way to dispose of narcotics: The laws vary by country and area  In the [place], the best way is to return the narcotics through a take-back program  This program is offered by the [employer] ([employer])  The following are options for using the program:  · Take the narcotics to a [facility] collection site  The site is often a law enforcement center  Call your local law enforcement center for scheduled take-back days in your area  You will be given information on where to go if the collection site is in a different location    · Take the narcotics to an approved pharmacy or hospital   A pharmacy or hospital may be set up as a collection site  You will need to ask if it is a [facility] collection site if you were not directed there  A pharmacy or doctor's office may not be able to take back narcotics unless it is a [facility] site  · Use a mail-back system  This means you are given containers to put the narcotics into  You will then mail them in the containers  · Use a take-back drop box  This is a place to leave the narcotics at any time  People and animals will not be able to get into the box  Your local law enforcement agency can tell you where to find a drop box in your area  Other ways to manage pain:   · Ask your healthcare provider about non-narcotic medicines to control pain  Nonprescription medicines include NSAIDs (such as ibuprofen) and acetaminophen  Prescription medicines include muscle relaxers, antidepressants, and steroids  · Pain may be managed without any medicines  Some ways to relieve pain include massage, aromatherapy, or meditation  Physical or occupational therapy may also help  For more information:   · Drug Enforcement Administration  [address] [place] [address] [address]  Phone: 3- 926 - [phone]  Web Address: [facility]/drug_disposal/    · [address] and Drug Administration  [facility] , [address]  Phone: 3- 489 - [phone]  Web Address: http://cafegive/     © Copyright [employer] 2018 Information is for End User's use only and may not be sold, redistributed or otherwise used for commercial purposes   All illustrations and images included in CareNotes® are the copyrighted property of A D A M , Inc  or [address] [place] [address] [employer]

## 2022-08-10 DIAGNOSIS — F51.01 PRIMARY INSOMNIA: ICD-10-CM

## 2022-08-10 RX ORDER — ZOLPIDEM TARTRATE 10 MG/1
5 TABLET ORAL
Qty: 30 TABLET | Refills: 0 | Status: SHIPPED | OUTPATIENT
Start: 2022-08-10 | End: 2022-09-12 | Stop reason: SDUPTHER

## 2022-08-12 DIAGNOSIS — R31.9 URINARY TRACT INFECTION WITH HEMATURIA, SITE UNSPECIFIED: Primary | ICD-10-CM

## 2022-08-12 DIAGNOSIS — N39.0 URINARY TRACT INFECTION WITH HEMATURIA, SITE UNSPECIFIED: Primary | ICD-10-CM

## 2022-08-12 PROBLEM — R29.90 STROKE-LIKE SYMPTOMS: Status: RESOLVED | Noted: 2022-04-22 | Resolved: 2022-08-12

## 2022-08-12 PROBLEM — K92.1 MELENA: Status: RESOLVED | Noted: 2020-07-15 | Resolved: 2022-08-12

## 2022-08-12 PROBLEM — R10.31 RIGHT LOWER QUADRANT ABDOMINAL PAIN: Status: RESOLVED | Noted: 2021-05-11 | Resolved: 2022-08-12

## 2022-08-12 PROBLEM — R77.8 ELEVATED TROPONIN: Status: RESOLVED | Noted: 2020-07-16 | Resolved: 2022-08-12

## 2022-08-12 PROBLEM — R19.7 DIARRHEA: Status: RESOLVED | Noted: 2021-05-11 | Resolved: 2022-08-12

## 2022-08-12 PROBLEM — K83.8 PNEUMOBILIA: Status: RESOLVED | Noted: 2022-06-18 | Resolved: 2022-08-12

## 2022-08-12 PROBLEM — E88.09 HYPOALBUMINEMIA DUE TO PROTEIN-CALORIE MALNUTRITION (HCC): Status: RESOLVED | Noted: 2021-05-17 | Resolved: 2022-08-12

## 2022-08-12 PROBLEM — R94.31 PROLONGED Q-T INTERVAL ON ECG: Status: RESOLVED | Noted: 2022-06-18 | Resolved: 2022-08-12

## 2022-08-12 PROBLEM — A41.9 SEPSIS (HCC): Status: RESOLVED | Noted: 2022-06-18 | Resolved: 2022-08-12

## 2022-08-12 PROBLEM — M25.531 PAIN IN RIGHT WRIST: Status: RESOLVED | Noted: 2018-02-12 | Resolved: 2022-08-12

## 2022-08-12 PROBLEM — Z98.1 STATUS POST LUMBAR SPINAL FUSION: Status: RESOLVED | Noted: 2018-03-16 | Resolved: 2022-08-12

## 2022-08-12 PROBLEM — M25.561 RIGHT KNEE PAIN: Status: RESOLVED | Noted: 2020-07-16 | Resolved: 2022-08-12

## 2022-08-12 PROBLEM — E83.42 HYPOMAGNESEMIA: Status: RESOLVED | Noted: 2022-06-21 | Resolved: 2022-08-12

## 2022-08-12 PROBLEM — E46 HYPOALBUMINEMIA DUE TO PROTEIN-CALORIE MALNUTRITION (HCC): Status: RESOLVED | Noted: 2021-05-17 | Resolved: 2022-08-12

## 2022-08-12 PROBLEM — R79.89 ELEVATED TROPONIN: Status: RESOLVED | Noted: 2020-07-16 | Resolved: 2022-08-12

## 2022-08-12 PROBLEM — Z96.89 S/P INSERTION OF SPINAL CORD STIMULATOR: Status: RESOLVED | Noted: 2018-07-18 | Resolved: 2022-08-12

## 2022-08-12 PROBLEM — D64.9 ANEMIA: Status: RESOLVED | Noted: 2022-06-21 | Resolved: 2022-08-12

## 2022-08-12 RX ORDER — CIPROFLOXACIN 500 MG/1
500 TABLET, FILM COATED ORAL EVERY 12 HOURS SCHEDULED
Qty: 20 TABLET | Refills: 0 | Status: SHIPPED | OUTPATIENT
Start: 2022-08-12 | End: 2022-08-22

## 2022-08-19 DIAGNOSIS — M96.1 POST LAMINECTOMY SYNDROME: ICD-10-CM

## 2022-08-19 DIAGNOSIS — M54.50 LUMBAR BACK PAIN: ICD-10-CM

## 2022-08-19 RX ORDER — OXYCODONE AND ACETAMINOPHEN 10; 325 MG/1; MG/1
1 TABLET ORAL EVERY 6 HOURS PRN
Qty: 100 TABLET | Refills: 0 | Status: SHIPPED | OUTPATIENT
Start: 2022-08-19 | End: 2022-09-19 | Stop reason: SDUPTHER

## 2022-08-23 ENCOUNTER — PATIENT OUTREACH (OUTPATIENT)
Dept: FAMILY MEDICINE CLINIC | Facility: CLINIC | Age: 77
End: 2022-08-23

## 2022-08-23 ENCOUNTER — PATIENT OUTREACH (OUTPATIENT)
Dept: CASE MANAGEMENT | Facility: OTHER | Age: 77
End: 2022-08-23

## 2022-08-23 DIAGNOSIS — Z78.9 NEEDS ASSISTANCE WITH COMMUNITY RESOURCES: Primary | ICD-10-CM

## 2022-08-23 NOTE — PROGRESS NOTES
[facility] received referral from [address] [place] [address] [address] to assist patient with Wendi Lester and ROSALIA  [facility] spoke to patient today and she states she already has the paperwork for Tru Jaimes and she wants to mail out the form herself  Pt indicated that she is interested in [facility]  I call [facility] and left message to call me back so I can provide the information for the patient

## 2022-08-24 ENCOUNTER — OFFICE VISIT (OUTPATIENT)
Dept: GASTROENTEROLOGY | Facility: CLINIC | Age: 77
End: 2022-08-24
Payer: COMMERCIAL

## 2022-08-24 ENCOUNTER — TELEPHONE (OUTPATIENT)
Dept: GASTROENTEROLOGY | Facility: CLINIC | Age: 77
End: 2022-08-24

## 2022-08-24 VITALS
BODY MASS INDEX: 34.47 KG/M2 | HEART RATE: 76 BPM | DIASTOLIC BLOOD PRESSURE: 58 MMHG | WEIGHT: 175.6 LBS | HEIGHT: 60 IN | SYSTOLIC BLOOD PRESSURE: 108 MMHG

## 2022-08-24 DIAGNOSIS — K31.84 GASTROPARESIS: ICD-10-CM

## 2022-08-24 DIAGNOSIS — K27.9 PUD (PEPTIC ULCER DISEASE): ICD-10-CM

## 2022-08-24 DIAGNOSIS — D64.9 ANEMIA, UNSPECIFIED TYPE: ICD-10-CM

## 2022-08-24 DIAGNOSIS — K22.2 PEPTIC STRICTURE OF ESOPHAGUS: Primary | ICD-10-CM

## 2022-08-24 DIAGNOSIS — R19.7 DIARRHEA, UNSPECIFIED TYPE: ICD-10-CM

## 2022-08-24 DIAGNOSIS — Z12.11 COLON CANCER SCREENING: ICD-10-CM

## 2022-08-24 PROCEDURE — 1160F RVW MEDS BY RX/DR IN RCRD: CPT | Performed by: NURSE PRACTITIONER

## 2022-08-24 PROCEDURE — 99213 OFFICE O/P EST LOW 20 MIN: CPT | Performed by: NURSE PRACTITIONER

## 2022-08-24 NOTE — PATIENT INSTRUCTIONS
-Continue Pantoprazole twice daily  Continue Carafate 4x daily but melt in water to create slurry consistency  -Decrease Reglan to three times daily 1/2 hour before meals  -Continue gastroparesis/soft diet  -Stop Pepcid-you can take this only as needed for breakthrough reflux symptoms  -Get labs ordered by PCP  -We will schedule EGD in October for recheck    NUTRITION RECOMMENDATIONS FOR PATIENTS WITH   DELAYED GASTRIC EMPTYING **    Nutrition education and diet modifications are important factors for controlling the symptoms of chronic nausea and vomiting associated with impaired stomach emptying  Maintaining good nutrition can be achieved with modest changes in your diet while at the same time help to reduce symptoms  Basic Points to Remember:    1  Eat smaller portions  The greater the meal volume, the slower the stomach empties  Eat smaller meals more frequently for easier digestion  2  Sit up or stand during eating and after meals  Body positioning during meals is very important  Avoid lying down while eating  Try to sit up or stand and walk around during and at least 3 hours after meals  3  Choose liquid or pureed foods should empty even on a bad day  Liquid food exits the stomach more easily than solids  Switch to a liquid diet or pureed/ground foods if necessary  The same thing can be accomplished in most patients my thoroughly chewing your food  Drink caloric drinks rather than water (e g  Peach/pear nectar, cranberry juice, Gator Aide, soup broth, Cory-Aid, etc )  4  Be aware of side effects from medications  Some medications may irritate the stomach or cause further delay of your stomach emptying  Either can lead to nausea and vomiting  Ask your doctor if you are currently taking any medications that may be causing chronic nausea and/or vomiting  5  If you have diabetes, the most important thing to do is to control your glucose levels   Elevated glucose levels >200 mg/dL can delay stomach emptying in healthy people  If you have diabetes, take frequent glucose measurements and make insulin adjustments as needed for glucose control  6  Avoid excess fiber intake  Some fiber is important to maintain normal intestinal functions  On the other hand, a high-fiber diet slows stomach emptying and increases the presence of food residue in the stomach  Avoid foods like oranges, berries, green beans, figs, skin on apples, potato peels, broccoli, cauliflower, and lettuce  Most fruits and vegetables can be digested successfully if they are smaller than 1/4 inch  Again, chewing and cutting up your food and eating with plenty of liquid is important  Avoid excess high-fiber supplements such as Metamucil, Citrucel, etc   7  Avoid high-fat foods  Fat slows the exit of food from the stomach  A low-fat diet is recommended; however, some liquid containing fat can be a good source of calories  8  Take a daily multi-vitamin supplement  A multi-vitamin supplement (specifically vitamins A and C, and the mineral iron) should be considered  9  Eat nutritious foods  Eat nutritiously before filling up on empty calories found in foods such as cakes, candy and pastries  10  Be aware of "trigger foods"  Some foods may cause symptoms more readily than others  Take note of these foods and avoid them if possible  Recommended Foods:    Skim milk, low-fat yogurt, low-fat milkshakes, low-fat cheeses, hot cereals (cream of wheat, grits)  Fat-free soups and bouillon with noodles and vegetables  Fruit juice, canned fruits without skin (applesauce, peaches, pears); add honey or syrup for extra calories  Eggs, peanut butter  Meats, fish, poultry; mix with broth, water, vegetable or V-8 juice  Low-grain bread and cereals, pasta, rice, crackers    Vegetable juices, cooked vegetables without skins (beets, carrots, mushrooms, potatoes, including mashed potatoes)  Tea, water, coffee, soda    ** These recommendations have been modified from a publication from the [address] [place] [address] [employer]  It was written by the following authors:  Nick Roberts         GERD (Gastroesophageal Reflux Disease)   WHAT YOU NEED TO KNOW:   Gastroesophageal reflux disease (GERD) is reflux that happens more than 2 times a week for a few weeks  Reflux means acid and food in your stomach back up into your esophagus  GERD can cause other health problems over time if it is not treated  DISCHARGE INSTRUCTIONS:   Call your local emergency number (911 in the [facility] [facility]) if:   You have severe chest pain and sudden trouble breathing  Return to the emergency department if:   You have trouble breathing after you vomit  You have trouble swallowing, or pain with swallowing  Your bowel movements are black, bloody, or tarry-looking  Your vomit looks like coffee grounds or has blood in it  Call your doctor or gastroenterologist if:   You feel full and cannot burp or vomit  You vomit large amounts, or you vomit often  You are losing weight without trying  Your symptoms get worse or do not improve with treatment  You have questions or concerns about your condition or care  Medicines:   Medicines  are used to decrease stomach acid  Medicine may also be used to help your lower esophageal sphincter and stomach contract (tighten) more  Take your medicine as directed  Contact your healthcare provider if you think your medicine is not helping or if you have side effects  Tell him of her if you are allergic to any medicine  Keep a list of the medicines, vitamins, and herbs you take  Include the amounts, and when and why you take them  Bring the list or the pill bottles to follow-up visits  Carry your medicine list with you in case of an emergency  Manage GERD:       Do not have foods or drinks that may increase heartburn    These include chocolate, peppermint, fried or fatty foods, drinks that contain caffeine, or carbonated drinks (soda)  Other foods include spicy foods, onions, tomatoes, and tomato-based foods  Do not have foods or drinks that can irritate your esophagus, such as citrus fruits, juices, and alcohol  Do not eat large meals  When you eat a lot of food at one time, your stomach needs more acid to digest it  Eat 6 small meals each day instead of 3 large ones, and eat slowly  Do not eat meals 2 to 3 hours before bedtime  Elevate the head of your bed  Place 6-inch blocks under the head of your bed frame  You may also use more than one pillow under your head and shoulders while you sleep  Maintain a healthy weight  If you are overweight, weight loss may help relieve symptoms of GERD  Do not smoke  Smoking weakens the lower esophageal sphincter and increases the risk of GERD  Ask your healthcare provider for information if you currently smoke and need help to quit  E-cigarettes or smokeless tobacco still contain nicotine  Talk to your healthcare provider before you use these products  Do not put pressure on your abdomen  Pressure pushes acid up into your esophagus  Do not wear clothing that is tight around your waist  Do not bend over  Bend at the knees if you need to pick something up  Follow up with your doctor or gastroenterologist as directed:  Write down your questions so you remember to ask them during your visits  © Copyright [employer] 2022 Information is for End User's use only and may not be sold, redistributed or otherwise used for commercial purposes  All illustrations and images included in CareNotes® are the copyrighted property of [employer]  or Norma Phipps  The above information is an  only  It is not intended as medical advice for individual conditions or treatments  Talk to your doctor, nurse or pharmacist before following any medical regimen to see if it is safe and effective for you

## 2022-08-24 NOTE — PROGRESS NOTES
[facility] - Outpatient Follow-up Note  Aundrea Swann 76 y o  female MRN: [id number]  Encounter: [id number]          ASSESSMENT AND PLAN:      1  Peptic stricture of esophagus  2  PUD (peptic ulcer disease)  3  Gastroparesis  Patient was recently admitted for intractable nausea and vomiting in July, she underwent EGD which showed transversable peptic stricture in the middle of the esophagus, lower 3rd of the esophagus and GE junction  Single large crater drowned ulcer in the pre-pyloric region with flat pigmented spot  Biopsies came back with acute esophagitis, negative for intestinal metaplasia  Mild chronic inactive gastritis negative for H pylori  Patient tolerating diet, denies any nausea/vomiting or reflux symptoms   -Continue Pantoprazole 40 mg b i d   -Continue Carafate 1 g q i d , melt to create slurry consistency  -Stop Pepcid BID, take only PRN  -Decrease Reglan to 10mg TID, goal will be to decrease this further if tolerating  Side effects discussed  -Follow soft, low fiber low fat gastroparesis diet  -Maintain glycemic control  Pt taking Percocet daily at night   -     EGD; Future; Expected date: 08/24/2022      4  Diarrhea, unspecified type  Controlled with Questran p r n     5  Colon cancer screening  Colonoscopy performed 7/22/2021 due to recent episode of diverticulitis and showed left-sided diverticulosis  No polyps  Repeat colonoscopy not recommended due to age          ______________________________________________________________________    SUBJECTIVE:  Aundrea Swann is a 76 y o  female with a past medical history of T2DM, Hypertension, Hypothyroidism, Overactive bladder , chronic pain syndrome, dyslipidemia, PUD, OLGA LIDIA, gastroparesis who presents for hospital follow up to 7/14-7/18 hospitalization for intractable nausea/vomiting        She had EGD performed 7/15 with Dr Christianne Valle which showed peptic stricture (transversable) in the middle 3rd of the esophagus and GE junction, single large crater round ulcer in the pre-pyloric region with flat pigmented spot (forest IIC)  He was put on PPI b i d , liquid Carafate, liquid diet, and recommended for repeat EGD in 3 months  She denies any dysphagia, nausea/vomiting, heartburn  She's sticking to a soft low fiber/low residue diet and tolerating this well  She lost 10 pounds prior to hospitalization and hasn't gained any back yet  She's also taking Pepcid Q12, Reglan QID  Denies any black stool  She has bouts of diarrhea every once in a while and takes Questran PRN which is effective for her  REVIEW OF SYSTEMS IS OTHERWISE NEGATIVE  Historical Information   Past Medical History:   Diagnosis Date    Acid reflux     Anemia     hx of iron-deficient    Anxiety     Arthritis     Asthma     last needed inhaler last year 2020    Basal cell carcinoma     upper lip    Chronic narcotic dependence (HCC)     Chronic pain     Colon polyp     Cystocele     Diabetes mellitus (HCC)     stable    Disease of thyroid gland     hypothyroidism    Diverticulosis     Dysfunctional uterine bleeding     last assessed - [id number]    Fibromyalgia     Gastric ulcer     Gastroparesis     History of colonic polyps     last assessed - [id number]    History of gastroesophageal reflux (GERD)     Hypercholesterolemia     Hyperlipidemia     Hypertension     IBS (irritable bowel syndrome)     Pneumobilia 6/18/2022    Post laminectomy syndrome     S/P insertion of spinal cord stimulator 7/18/2018    Seasonal allergies     Spinal stenosis     Status post lumbar spinal fusion 3/16/2018     Past Surgical History:   Procedure Laterality Date    APPENDECTOMY      BACK SURGERY      BREAST CYST EXCISION Left     CHOLECYSTECTOMY      COLONOSCOPY      ESOPHAGOGASTRODUODENOSCOPY N/A 09/28/2016    Procedure: ESOPHAGOGASTRODUODENOSCOPY (EGD); Surgeon: Chung Miramontes MD;  [facility];   Service:    BRANDIE Ann HYSTERECTOMY      TTAH-BSO age 25   [facility] LAMINECTOMY      LUMBAR LAMINECTOMY      OOPHORECTOMY      age 25   [facility] FL ARTHRODESIS POSTERIOR/POSTEROLATERAL THORACIC N/A 2018    Procedure: Reopening of lumbar incision for T12-L5 posterior instrumented fixation and fusion and T12-L4 posterior decompression;  Surgeon: Grace Vergara MD;  [facility];  Service: Neurosurgery    FL COLONOSCOPY FLX DX W/COLLJ SPEC WHEN PFRMD N/A 2016    Procedure: EGD AND COLONOSCOPY;  Surgeon: Hany Lane MD;  [facility]; Service: Gastroenterology    FL DILATE ESOPHAGUS N/A 2016    Procedure: DILATATION ESOPHAGEAL;  Surgeon: Hany Lane MD;  [facility]; Service: Gastroenterology    FL ESOPHAGOGASTRODUODENOSCOPY TRANSORAL DIAGNOSTIC N/A 2016    Procedure: ESOPHAGOGASTRODUODENOSCOPY (EGD); Surgeon: Hany Lane MD;  [facility];   Service: Gastroenterology    FL IMPLANT SPINAL NEUROSTIM/ Left 2018    Procedure: removal of left buttock implantable pulse generator and placement of new  implantable pulse generator;  Surgeon: Grace Vergara MD;  [facility];  Service: Neurosurgery     Social History   Social History     Substance and Sexual Activity   Alcohol Use Not Currently    Comment: Denied history of alcohol use     Social History     Substance and Sexual Activity   Drug Use No    Comment: Denied history of drug use     Social History     Tobacco Use   Smoking Status Former Smoker    Types: Cigarettes    Quit date: 1970    Years since quittin 6   Smokeless Tobacco Never Used   Tobacco Comment    Denied history of current ever day smoker, Former smoker and Never smoker all documented in [employer]     Family History   Problem Relation Age of Onset    Lung cancer Mother 55    Pulmonary embolism Father     No Known Problems Sister     No Known Problems Daughter     No Known Problems Daughter     Stroke Maternal Grandmother     Heart attack Maternal Grandfather     No Known Problems Paternal Grandmother     No Known Problems Paternal Grandfather     No Known Problems Maternal Aunt     Diabetes Family         Diabetes mellitus    Hypertension Family     Stroke Family         Stroke complications       Meds/Allergies       Current Outpatient Medications:     albuterol (PROVENTIL HFA,VENTOLIN HFA) 90 mcg/act inhaler    aspirin 81 mg chewable tablet    atorvastatin (LIPITOR) 40 mg tablet    baclofen 10 mg tablet    escitalopram (Lexapro) 20 mg tablet    gabapentin (NEURONTIN) 300 mg capsule    glucose blood test strip    levothyroxine 25 mcg tablet    lisinopril (ZESTRIL) 20 mg tablet    magnesium Oxide (MAG-OX) 400 mg TABS    metFORMIN (GLUCOPHAGE) 1000 MG tablet    metoclopramide (REGLAN) 10 mg tablet    metoprolol tartrate (LOPRESSOR) 25 mg tablet    Milnacipran HCl (Savella) 100 MG TABS    nystatin (MYCOSTATIN) powder    oxyCODONE-acetaminophen (PERCOCET)  mg per tablet    pantoprazole (PROTONIX) 40 mg tablet    saccharomyces boulardii (FLORASTOR) 250 mg capsule    sucralfate (CARAFATE) 1 g tablet    trospium chloride (SANCTURA) 20 mg tablet    zolpidem (AMBIEN) 10 mg tablet    latanoprost (XALATAN) 0 005 % ophthalmic solution    meclizine (ANTIVERT) 25 mg tablet    Allergies   Allergen Reactions    Penicillins Anaphylaxis, Hives and Other (See Comments)     Other reaction(s): Unknown Reaction    Sulfa Antibiotics Anaphylaxis and Other (See Comments)     Other reaction(s): Unknown Reaction    Aspartame - Food Allergy Other (See Comments) and Hypertension     Slurred speech, weakness, stroke sx    Iodinated Diagnostic Agents Hives     Pt has taken prep prior for contrast and has not had any break through reaction    Keflex [Cephalexin] Hives           Objective     Blood pressure 108/58, pulse 76, height 5' (1 524 m), weight 79 7 kg (175 lb 9 6 oz), not currently breastfeeding   Body mass index is 34 29 kg/m²       PHYSICAL EXAM:      General Appearance:   Alert, cooperative, no distress   HEENT:   Normocephalic, atraumatic, anicteric  Neck:  Supple, symmetrical, trachea midline   Lungs:   Clear to auscultation bilaterally; no rales, rhonchi or wheezing; respirations unlabored    Heart[de-identified]   Regular rate and rhythm; no murmur  Abdomen:   Soft, non-tender, non-distended; normal bowel sounds; no masses, no organomegaly    Genitalia:   Deferred    Rectal:   Deferred    Extremities:  No cyanosis, clubbing or edema    Skin:  No jaundice, rashes, or lesions    Lymph nodes:  No palpable cervical lymphadenopathy        Lab Results:   No visits with results within 1 Day(s) from this visit  Latest known visit with results is:   Office Visit on 08/09/2022   Component Date Value    Hemoglobin A1C 08/09/2022 6 8 (A)         Radiology Results:   CT head without contrast    Result Date: 7/25/2022  Narrative: CT BRAIN - WITHOUT CONTRAST INDICATION:   Head trauma, minor (Age >= 65y) Fall  COMPARISON:  4/24/2022 TECHNIQUE:  CT examination of the brain was performed  In addition to axial images, sagittal and coronal 2D reformatted images were created and submitted for interpretation  Radiation dose length product (DLP) for this visit:  872 mGy-cm   This examination, like all CT scans performed in the [facility], was performed utilizing techniques to minimize radiation dose exposure, including the use of iterative reconstruction and automated exposure control  IMAGE QUALITY:  Diagnostic  FINDINGS: PARENCHYMA: No hemorrhage, extra-axial fluid collection, mass effect or midline shift  Gray-white matter differentiation preserved  Nonspecific, similar and mild patchy periventricular and subcortical white matter lucencies most commonly related to changes of microangiopathy  Old lacunar infarcts or benign perivascular spaces in the basal ganglia  Vascular calcification   VENTRICLES AND EXTRA-AXIAL SPACES: Within normal limits for patient age  VISUALIZED ORBITS AND PARANASAL SINUSES: Clear sinuses  Globes and orbits are within normal limits  CALVARIUM AND EXTRACRANIAL SOFT TISSUES:  Within normal limits  Mastoid air cells are well aerated  Impression: No acute intracranial abnormality  Nonemergent findings above  Workstation performed: [id number]     CT spine cervical without contrast    Result Date: 7/25/2022  Narrative: CT CERVICAL SPINE - WITHOUT CONTRAST INDICATION:   Neck trauma (Age >= 65y) Fall  COMPARISON:  Neck CTA from 4/22/2022 TECHNIQUE:  CT examination of the cervical spine was performed without intravenous contrast   Contiguous axial images were obtained  Sagittal and coronal reconstructions were performed  Radiation dose length product (DLP) for this visit:  531 mGy-cm   This examination, like all CT scans performed in the [facility], was performed utilizing techniques to minimize radiation dose exposure, including the use of iterative reconstruction and automated exposure control  IMAGE QUALITY:  Diagnostic  FINDINGS: ALIGNMENT:  Similar mild cervicothoracic scoliosis and cervical straightening  No new spondylolisthesis  Preserved atlantoaxial interval and cranial cervical junction  VERTEBRAL BODIES:  No fracture or suspicious bone lesion  DEGENERATIVE CHANGES:  Similar multilevel marked degenerative disc disease and facet osteoarthritis  No evidence of critical central canal stenosis  PREVERTEBRAL AND PARASPINAL SOFT TISSUES:  No prevertebral soft tissue swelling  No acute findings in the visualized brain or neck  THORACIC INLET:  No acute findings  Impression: No cervical spine fracture or traumatic malalignment   Workstation performed: [id number]

## 2022-08-25 ENCOUNTER — TELEPHONE (OUTPATIENT)
Dept: GASTROENTEROLOGY | Facility: CLINIC | Age: 77
End: 2022-08-25

## 2022-08-25 NOTE — TELEPHONE ENCOUNTER
Patients GI provider:  Dr Aye Maloney    Number to return call: [phone]     Reason for call: Pt calling to reschedule her procedure     Scheduled procedure/appointment date if applicable: Procedure 98/9/44

## 2022-09-09 DIAGNOSIS — I10 ESSENTIAL HYPERTENSION: ICD-10-CM

## 2022-09-09 DIAGNOSIS — K31.84 GASTROPARESIS: ICD-10-CM

## 2022-09-09 RX ORDER — METOCLOPRAMIDE 10 MG/1
10 TABLET ORAL 4 TIMES DAILY PRN
Qty: 120 TABLET | Refills: 2 | Status: SHIPPED | OUTPATIENT
Start: 2022-09-09

## 2022-09-09 RX ORDER — LISINOPRIL 20 MG/1
20 TABLET ORAL DAILY
Qty: 30 TABLET | Refills: 6 | Status: SHIPPED | OUTPATIENT
Start: 2022-09-09 | End: 2022-11-09 | Stop reason: SDUPTHER

## 2022-09-12 DIAGNOSIS — F51.01 PRIMARY INSOMNIA: ICD-10-CM

## 2022-09-12 RX ORDER — ZOLPIDEM TARTRATE 10 MG/1
5 TABLET ORAL
Qty: 30 TABLET | Refills: 0 | Status: SHIPPED | OUTPATIENT
Start: 2022-09-12 | End: 2022-09-16

## 2022-09-13 ENCOUNTER — PATIENT OUTREACH (OUTPATIENT)
Dept: CASE MANAGEMENT | Facility: OTHER | Age: 77
End: 2022-09-13

## 2022-09-13 NOTE — PROGRESS NOTES
CMOC spoke to ANNABELLA at [employer] and she stated the application was received yesterday and patient has been granted Senior Share Ride with [employer]  Pt has to pay for all trips with [employer] [facility]  [facility] send secured e-mail to [email or web address] to place a referral for [employer] today  [facility] left message for patient to call me back  Addendum:   Pt is aware of [employer] and [employer]  CMOC told pt if she needs help scheduling a a trip she needs to let me know and I can assist her  At this time pt has no other questions or concerns

## 2022-09-14 ENCOUNTER — TELEPHONE (OUTPATIENT)
Dept: NEUROLOGY | Facility: CLINIC | Age: 77
End: 2022-09-14

## 2022-09-14 NOTE — TELEPHONE ENCOUNTER
Called patient contact to bring in sooner for HFU Dr Ellsworth Duty do to provider will be out of office on 10/25/2022

## 2022-09-14 NOTE — TELEPHONE ENCOUNTER
Called and spoke with patient to offer a sooner appointment  Patient stated that she called before when appointment needed to be cancel do to transportation  Instead patient appointment was rescheduled  Patient wanted to cancel her appointment and call back when her transportation is ready with [employer]  I also offer to reach out to  to assist with this patient declined at this time

## 2022-09-16 DIAGNOSIS — F51.01 PRIMARY INSOMNIA: ICD-10-CM

## 2022-09-16 RX ORDER — ZOLPIDEM TARTRATE 10 MG/1
10 TABLET ORAL
Qty: 30 TABLET | Refills: 0 | Status: SHIPPED | OUTPATIENT
Start: 2022-09-16 | End: 2022-10-24 | Stop reason: SDUPTHER

## 2022-09-17 RX ORDER — ZOLPIDEM TARTRATE 10 MG/1
10 TABLET ORAL
Qty: 30 TABLET | Refills: 3 | Status: SHIPPED | OUTPATIENT
Start: 2022-09-17

## 2022-09-19 DIAGNOSIS — M54.50 LUMBAR BACK PAIN: ICD-10-CM

## 2022-09-19 DIAGNOSIS — M96.1 POST LAMINECTOMY SYNDROME: ICD-10-CM

## 2022-09-19 RX ORDER — OXYCODONE AND ACETAMINOPHEN 10; 325 MG/1; MG/1
1 TABLET ORAL EVERY 6 HOURS PRN
Qty: 100 TABLET | Refills: 0 | Status: SHIPPED | OUTPATIENT
Start: 2022-09-19 | End: 2022-09-23 | Stop reason: SDUPTHER

## 2022-09-23 DIAGNOSIS — M54.50 LUMBAR BACK PAIN: ICD-10-CM

## 2022-09-23 DIAGNOSIS — M96.1 POST LAMINECTOMY SYNDROME: ICD-10-CM

## 2022-09-23 RX ORDER — OXYCODONE AND ACETAMINOPHEN 10; 325 MG/1; MG/1
1 TABLET ORAL EVERY 6 HOURS PRN
Qty: 120 TABLET | Refills: 0 | Status: SHIPPED | OUTPATIENT
Start: 2022-09-23 | End: 2022-10-24 | Stop reason: SDUPTHER

## 2022-09-28 DIAGNOSIS — R32 URINARY INCONTINENCE, UNSPECIFIED TYPE: ICD-10-CM

## 2022-09-28 RX ORDER — TROSPIUM CHLORIDE 20 MG/1
20 TABLET, FILM COATED ORAL 2 TIMES DAILY
Qty: 90 TABLET | Refills: 3 | Status: SHIPPED | OUTPATIENT
Start: 2022-09-28 | End: 2023-03-08 | Stop reason: DRUGHIGH

## 2022-09-29 ENCOUNTER — TELEMEDICINE (OUTPATIENT)
Dept: FAMILY MEDICINE CLINIC | Facility: CLINIC | Age: 77
End: 2022-09-29
Payer: COMMERCIAL

## 2022-09-29 DIAGNOSIS — L03.113 CELLULITIS OF RIGHT UPPER EXTREMITY: Primary | ICD-10-CM

## 2022-09-29 PROCEDURE — 99213 OFFICE O/P EST LOW 20 MIN: CPT | Performed by: NURSE PRACTITIONER

## 2022-09-29 RX ORDER — CEFUROXIME AXETIL 500 MG/1
500 TABLET ORAL EVERY 12 HOURS SCHEDULED
Qty: 14 TABLET | Refills: 0 | Status: SHIPPED | OUTPATIENT
Start: 2022-09-29 | End: 2022-10-06

## 2022-09-29 NOTE — PROGRESS NOTES
Virtual Regular Visit    Verification of patient location:    Patient is located in the following state in which I hold an active license [place]    Assessment/Plan:    Problem List Items Addressed This Visit    None     Visit Diagnoses     Cat bite, initial encounter    -  Primary    Relevant Medications    cefuroxime (CEFTIN) 500 mg tablet               Reason for visit is   Chief Complaint   Patient presents with    Virtual Regular Visit        Encounter provider Barbara Hollis [address]     Provider located at [address] [place], [place] [address]      Recent Visits  No visits were found meeting these conditions  Showing recent visits within past 7 days and meeting all other requirements  Today's Visits  Date Type Provider Dept   09/29/22 Telemedicine Barbara Hollis, [id number] [facility]   09/29/22 Telephone Anjali Alvarez [place] [address] today's visits and meeting all other requirements  Future Appointments  No visits were found meeting these conditions  Showing future appointments within next 150 days and meeting all other requirements       The patient was identified by name and date of birth  Sol Paitner was informed that this is a telemedicine visit and that the visit is being conducted through [facility] and patient was informed that this is a secure, HIPAA-compliant platform  She agrees to proceed     My office door was closed  No one else was in the room  She acknowledged consent and understanding of privacy and security of the video platform  The patient has agreed to participate and understands they can discontinue the visit at any time  Patient is aware this is a billable service  Subjective  Sol Painter is a 76 y o  female      76 y  o female presenting with a cat bite that occurred last night (09/28/2022)  She reports that she cleaned the wound out with soap and water after the incident occurred   The cat is her pet and is a strictly indoor cat  She reports that she was on the telephone and the cat jumped at her and bit because she was not paying attention to him  Past Medical History:   Diagnosis Date    Acid reflux     Anemia     hx of iron-deficient    Anxiety     Arthritis     Asthma     last needed inhaler last year 2020    Basal cell carcinoma     upper lip    Chronic narcotic dependence (HCC)     Chronic pain     Colon polyp     Cystocele     Diabetes mellitus (HCC)     stable    Disease of thyroid gland     hypothyroidism    Diverticulosis     Dysfunctional uterine bleeding     last assessed - [id number]    Fibromyalgia     Gastric ulcer     Gastroparesis     History of colonic polyps     last assessed - [id number]    History of gastroesophageal reflux (GERD)     Hypercholesterolemia     Hyperlipidemia     Hypertension     IBS (irritable bowel syndrome)     Pneumobilia 6/18/2022    Post laminectomy syndrome     S/P insertion of spinal cord stimulator 7/18/2018    Seasonal allergies     Spinal stenosis     Status post lumbar spinal fusion 3/16/2018       Past Surgical History:   Procedure Laterality Date    APPENDECTOMY      BACK SURGERY      BREAST CYST EXCISION Left     CHOLECYSTECTOMY      COLONOSCOPY      ESOPHAGOGASTRODUODENOSCOPY N/A 09/28/2016    Procedure: ESOPHAGOGASTRODUODENOSCOPY (EGD); Surgeon: William Rao MD;  [facility];   Service:     HERNIA REPAIR      HYSTERECTOMY      TTAH-BSO age 25   [place]  LAMINECTOMY      LUMBAR LAMINECTOMY      OOPHORECTOMY      age 25   [place]  OR ARTHRODESIS POSTERIOR/POSTEROLATERAL THORACIC N/A 06/04/2018    Procedure: Reopening of lumbar incision for T12-L5 posterior instrumented fixation and fusion and T12-L4 posterior decompression;  Surgeon: Alida Fernandez MD;  [facility];  Service: Neurosurgery    OR COLONOSCOPY FLX DX W/COLLJ SPEC WHEN PFRMD N/A 03/02/2016    Procedure: EGD AND COLONOSCOPY;  Surgeon: William Rao MD; [facility]; Service: Gastroenterology    NM DILATE ESOPHAGUS N/A 09/28/2016    Procedure: DILATATION ESOPHAGEAL;  Surgeon: Jessy Fu MD;  [facility]; Service: Gastroenterology    NM ESOPHAGOGASTRODUODENOSCOPY TRANSORAL DIAGNOSTIC N/A 07/18/2016    Procedure: ESOPHAGOGASTRODUODENOSCOPY (EGD); Surgeon: Jessy Fu MD;  [facility];   Service: Gastroenterology    NM IMPLANT SPINAL NEUROSTIM/ Left 06/04/2018    Procedure: removal of left buttock implantable pulse generator and placement of new  implantable pulse generator;  Surgeon: Jose Luis Del Rosario MD;  [facility];  Service: Neurosurgery       Current Outpatient Medications   Medication Sig Dispense Refill    cefuroxime (CEFTIN) 500 mg tablet Take 1 tablet (500 mg total) by mouth every 12 (twelve) hours for 7 days 14 tablet 0    oxyCODONE-acetaminophen (PERCOCET)  mg per tablet Take 1 tablet by mouth every 6 (six) hours as needed for severe pain Max Daily Amount: 4 tablets 120 tablet 0    albuterol (PROVENTIL HFA,VENTOLIN HFA) 90 mcg/act inhaler Inhale 2 puffs every 6 (six) hours as needed for wheezing or shortness of breath 6 7 g 5    aspirin 81 mg chewable tablet Chew 1 tablet (81 mg total) daily 30 tablet 0    atorvastatin (LIPITOR) 40 mg tablet Take 1 tablet (40 mg total) by mouth daily 90 tablet 1    baclofen 10 mg tablet Take one tablet by mouth three times a day as needed for muscle spasms 90 tablet 4    escitalopram (Lexapro) 20 mg tablet Take 0 5 tablets (10 mg total) by mouth daily 30 tablet 5    gabapentin (NEURONTIN) 300 mg capsule Take 1 capsule (300 mg total) by mouth 2 (two) times a day 60 capsule 5    glucose blood test strip Bid testing 100 each 6    latanoprost (XALATAN) 0 005 % ophthalmic solution Administer 1 drop to both eyes daily at bedtime 2 5 mL 3    levothyroxine 25 mcg tablet Take 1 5 tablets (37 5 mcg total) by mouth daily 45 tablet 4    lisinopril (ZESTRIL) 20 mg tablet Take 1 tablet (20 mg total) by mouth daily 30 tablet 6    magnesium Oxide (MAG-OX) 400 mg TABS Take 1 tablet (400 mg total) by mouth 2 (two) times a day 30 tablet 0    meclizine (ANTIVERT) 25 mg tablet Take 1 tablet (25 mg total) by mouth 4 (four) times a day as needed for dizziness 60 tablet 0    metFORMIN (GLUCOPHAGE) 1000 MG tablet Take 1 tablet (1,000 mg total) by mouth 2 (two) times a day with meals 180 tablet 3    metoclopramide (REGLAN) 10 mg tablet Take 1 tablet (10 mg total) by mouth 4 (four) times a day as needed (Nausea and vomiting) 120 tablet 2    metoprolol tartrate (LOPRESSOR) 25 mg tablet Take 0 5 tablets (12 5 mg total) by mouth every 12 (twelve) hours 45 tablet 3    Milnacipran HCl (Savella) 100 MG TABS Take 1 tablet (100 mg total) by mouth daily 30 tablet 3    nystatin (MYCOSTATIN) powder Apply topically 2 (two) times a day 15 g 0    pantoprazole (PROTONIX) 40 mg tablet Take 1 tablet (40 mg total) by mouth every 12 (twelve) hours 180 tablet 0    saccharomyces boulardii (FLORASTOR) 250 mg capsule Take 1 capsule (250 mg total) by mouth 2 (two) times a day 90 capsule 0    sucralfate (CARAFATE) 1 g tablet Take 1 tablet (1 g total) by mouth 4 (four) times a day 120 tablet 0    trospium chloride (SANCTURA) 20 mg tablet Take 1 tablet (20 mg total) by mouth 2 (two) times a day 90 tablet 3    zolpidem (AMBIEN) 10 mg tablet Take 1 tablet (10 mg total) by mouth daily at bedtime as needed for sleep 30 tablet 0    zolpidem (AMBIEN) 10 mg tablet Take 1 tablet (10 mg total) by mouth daily at bedtime as needed for sleep 30 tablet 3     No current facility-administered medications for this visit          Allergies   Allergen Reactions    Penicillins Anaphylaxis, Hives and Other (See Comments)     Other reaction(s): Unknown Reaction    Sulfa Antibiotics Anaphylaxis and Other (See Comments)     Other reaction(s): Unknown Reaction    Aspartame - Food Allergy Other (See Comments) and Hypertension Slurred speech, weakness, stroke sx    Iodinated Diagnostic Agents Hives     Pt has taken prep prior for contrast and has not had any break through reaction    Keflex [Cephalexin] Hives       Review of Systems   Constitutional: Negative  Respiratory: Negative  Cardiovascular: Negative  Gastrointestinal: Negative  Musculoskeletal: Positive for myalgias  Skin: Positive for wound ( right forearm)  Neurological: Negative  Psychiatric/Behavioral: Negative  Video Exam    There were no vitals filed for this visit  (Vital signs not performed during visit due to limitations of telemedicine format along with patient's availability to needed equipment)    Physical Exam  Vitals reviewed  Constitutional:       General: She is not in acute distress  Appearance: Normal appearance  She is well-developed and well-groomed  She is not ill-appearing  HENT:      Head: Normocephalic and atraumatic  Right Ear: External ear normal       Left Ear: External ear normal       Mouth/Throat:      Lips: Pink  Eyes:      General: Lids are normal       Extraocular Movements: Extraocular movements intact  Conjunctiva/sclera: Conjunctivae normal       Pupils: Pupils are equal, round, and reactive to light  Neck:      Trachea: Trachea normal    Cardiovascular:      Rate and Rhythm: Normal rate  Pulmonary:      Effort: Pulmonary effort is normal    Skin:     Capillary Refill: Capillary refill takes less than 2 seconds  Findings: Wound present  Neurological:      General: No focal deficit present  Mental Status: She is alert and oriented to person, place, and time  Psychiatric:         Mood and Affect: Mood normal          Behavior: Behavior normal  Behavior is cooperative            I spent 15 minutes directly with the patient during this visit

## 2022-10-04 ENCOUNTER — PATIENT OUTREACH (OUTPATIENT)
Dept: CASE MANAGEMENT | Facility: OTHER | Age: 77
End: 2022-10-04

## 2022-10-04 NOTE — PROGRESS NOTES
[facility] spoke to [employer] today and as of today they have not heard any information from the county office to see if the meals can be funded through them  Pt is aware and has been in communication with [employer]  [employer] is sending message to Ella Dobbs to see if she can contact the county office to check the status of funding for meals on wheels  At this time all goals have been completed  [facility] will now be closing the case   Pt is aware as well as team

## 2022-10-13 ENCOUNTER — HOSPITAL ENCOUNTER (OUTPATIENT)
Dept: GASTROENTEROLOGY | Facility: AMBULATORY SURGERY CENTER | Age: 77
Discharge: HOME | End: 2022-10-13
Payer: COMMERCIAL

## 2022-10-13 ENCOUNTER — ANESTHESIA EVENT (OUTPATIENT)
Dept: GASTROENTEROLOGY | Facility: AMBULATORY SURGERY CENTER | Age: 77
End: 2022-10-13

## 2022-10-13 ENCOUNTER — ANESTHESIA (OUTPATIENT)
Dept: GASTROENTEROLOGY | Facility: AMBULATORY SURGERY CENTER | Age: 77
End: 2022-10-13

## 2022-10-13 VITALS
OXYGEN SATURATION: 96 % | BODY MASS INDEX: 36.68 KG/M2 | SYSTOLIC BLOOD PRESSURE: 166 MMHG | HEART RATE: 85 BPM | HEIGHT: 57 IN | DIASTOLIC BLOOD PRESSURE: 77 MMHG | WEIGHT: 170 LBS | TEMPERATURE: 96.3 F | RESPIRATION RATE: 18 BRPM

## 2022-10-13 DIAGNOSIS — K27.9 PUD (PEPTIC ULCER DISEASE): ICD-10-CM

## 2022-10-13 DIAGNOSIS — K22.2 PEPTIC STRICTURE OF ESOPHAGUS: ICD-10-CM

## 2022-10-13 PROCEDURE — 43239 EGD BIOPSY SINGLE/MULTIPLE: CPT | Performed by: INTERNAL MEDICINE

## 2022-10-13 RX ORDER — PROPOFOL 10 MG/ML
INJECTION, EMULSION INTRAVENOUS AS NEEDED
Status: DISCONTINUED | OUTPATIENT
Start: 2022-10-13 | End: 2022-10-13

## 2022-10-13 RX ORDER — ALBUTEROL SULFATE 90 UG/1
AEROSOL, METERED RESPIRATORY (INHALATION) AS NEEDED
Status: DISCONTINUED | OUTPATIENT
Start: 2022-10-13 | End: 2022-10-13

## 2022-10-13 RX ORDER — LIDOCAINE HYDROCHLORIDE 10 MG/ML
INJECTION, SOLUTION EPIDURAL; INFILTRATION; INTRACAUDAL; PERINEURAL AS NEEDED
Status: DISCONTINUED | OUTPATIENT
Start: 2022-10-13 | End: 2022-10-13

## 2022-10-13 RX ORDER — SODIUM CHLORIDE, SODIUM LACTATE, POTASSIUM CHLORIDE, CALCIUM CHLORIDE 600; 310; 30; 20 MG/100ML; MG/100ML; MG/100ML; MG/100ML
INJECTION, SOLUTION INTRAVENOUS CONTINUOUS PRN
Status: DISCONTINUED | OUTPATIENT
Start: 2022-10-13 | End: 2022-10-13

## 2022-10-13 RX ADMIN — PROPOFOL 100 MG: 10 INJECTION, EMULSION INTRAVENOUS at 08:22

## 2022-10-13 RX ADMIN — ALBUTEROL SULFATE 2 PUFF: 90 AEROSOL, METERED RESPIRATORY (INHALATION) at 07:55

## 2022-10-13 RX ADMIN — SODIUM CHLORIDE, SODIUM LACTATE, POTASSIUM CHLORIDE, CALCIUM CHLORIDE: 600; 310; 30; 20 INJECTION, SOLUTION INTRAVENOUS at 08:17

## 2022-10-13 RX ADMIN — PROPOFOL 20 MG: 10 INJECTION, EMULSION INTRAVENOUS at 08:27

## 2022-10-13 RX ADMIN — LIDOCAINE HYDROCHLORIDE 80 MG: 10 INJECTION, SOLUTION EPIDURAL; INFILTRATION; INTRACAUDAL; PERINEURAL at 08:22

## 2022-10-13 NOTE — ANESTHESIA PREPROCEDURE EVALUATION
Procedure:  EGD    Relevant Problems   CARDIO   (+) Hypercholesterolemia   (+) Hypertension      ENDO   (+) Hypothyroidism   (+) Type 2 diabetes mellitus (HCC)      GI/HEPATIC   (+) Prepyloric ulcer      HEMATOLOGY   (+) Iron deficiency anemia secondary to inadequate dietary iron intake      MUSCULOSKELETAL   (+) Failed back surgical syndrome      NEURO/PSYCH   (+) Anxiety   (+) Cerebrovascular accident (CVA), unspecified mechanism (HCC)   (+) Chronic pain disorder   (+) Continuous opioid dependence ([address] )      PULMONARY   (+) Mild intermittent asthma without complication        Physical Exam    Airway    Mallampati score: II  TM Distance: >3 FB  Neck ROM: full     Dental   upper dentures and lower dentures,     Cardiovascular      Pulmonary      Other Findings        Anesthesia Plan  ASA Score- 3     Anesthesia Type- IV sedation with anesthesia with ASA Monitors  Additional Monitors:   Airway Plan:           Plan Factors-Exercise tolerance (METS): >4 METS  Chart reviewed  Patient is not a current smoker  Patient did not smoke on day of surgery  Obstructive sleep apnea risk education given perioperatively  Induction- intravenous  Postoperative Plan-     Informed Consent- Anesthetic plan and risks discussed with patient  I personally reviewed this patient with the CRNA  Discussed and agreed on the Anesthesia Plan with the CRNA           NPO appropriate  Discussed benefits/risks of monitored anesthetic care and discussed providing a dynamic level of mild to deep sedation  Risks include awareness, airway obstruction, aspiration which may necessitate conversion to general anesthesia  All questions answered  Patient understands and wishes to proceed  Anesthesia plan and consent discussed with Melida Allen who expressed understanding and agreement   Risks/benefits and alternatives discussed with patient including possible PONV, sore throat, damage to teeth/lips/gums and possibility of rare anesthetic and surgical emergencies

## 2022-10-13 NOTE — ANESTHESIA POSTPROCEDURE EVALUATION
Post-Op Assessment Note    CV Status:  Stable    Pain management: adequate     Mental Status:  Sleepy   Hydration Status:  Unstable   PONV Controlled:  Controlled   Airway Patency:  Patent      Post Op Vitals Reviewed: Yes      Staff: Anesthesiologist, CRNA         No complications documented      BP   155/70   Temp     Pulse  74   Resp   10   SpO2   100

## 2022-10-13 NOTE — H&P
History and Physical - [facility]  Sanjay Valdes 76 y o  female MRN: [id number]                  HPI: Sanjay Valdes is a 76y o  year old female who presents for re-evaluation of esophageal strictures, gastric ulcer      REVIEW OF SYSTEMS: Per the HPI, and otherwise unremarkable  Historical Information   Past Medical History:   Diagnosis Date   • Acid reflux    • Anemia     hx of iron-deficient   • Anxiety    • Arthritis    • Asthma     last needed inhaler last year 2020   • Basal cell carcinoma     upper lip   • Chronic narcotic dependence (HCC)    • Chronic pain    • Colon polyp    • Cystocele    • Diabetes mellitus ([address] )     stable   • Disease of thyroid gland     hypothyroidism   • Diverticulosis    • Dizziness     at times   • Dysfunctional uterine bleeding     last assessed - [id number]   • Dysphagia    • Fibromyalgia    • Gastric ulcer    • Gastroparesis    • History of colonic polyps     last assessed - [id number]   • History of gastroesophageal reflux (GERD)    • Hypercholesterolemia    • Hyperlipidemia    • Hypertension    • IBS (irritable bowel syndrome)    • Pneumobilia 06/18/2022   • Post laminectomy syndrome    • S/P insertion of spinal cord stimulator 07/18/2018   • Seasonal allergies    • Shortness of breath     exertional   • Spinal stenosis    • Status post lumbar spinal fusion 03/16/2018   • Stroke [facility])     pt states slight stroke March 2022     Past Surgical History:   Procedure Laterality Date   • APPENDECTOMY     • BACK SURGERY     • BREAST CYST EXCISION Left    • CHOLECYSTECTOMY     • COLONOSCOPY     • ESOPHAGOGASTRODUODENOSCOPY N/A 09/28/2016    Procedure: ESOPHAGOGASTRODUODENOSCOPY (EGD); Surgeon: Jessy Fu MD;  [facility];   Service:    • HERNIA REPAIR     • HYSTERECTOMY      TTAH-BSO age 25   • LAMINECTOMY     • LUMBAR LAMINECTOMY     • OOPHORECTOMY      age 25   • ME ARTHRODESIS POSTERIOR/POSTEROLATERAL THORACIC N/A 06/04/2018    Procedure: Reopening of lumbar incision for T12-L5 posterior instrumented fixation and fusion and T12-L4 posterior decompression;  Surgeon: Clifton Harrington MD;  [facility];  Service: Neurosurgery   • DE COLONOSCOPY FLX DX W/COLLJ SPEC WHEN PFRMD N/A 2016    Procedure: EGD AND COLONOSCOPY;  Surgeon: Milagro Sanchez MD;  [facility]; Service: Gastroenterology   • DE DILATE ESOPHAGUS N/A 2016    Procedure: DILATATION ESOPHAGEAL;  Surgeon: Milagro Sanchez MD;  [facility]; Service: Gastroenterology   • DE ESOPHAGOGASTRODUODENOSCOPY TRANSORAL DIAGNOSTIC N/A 2016    Procedure: ESOPHAGOGASTRODUODENOSCOPY (EGD); Surgeon: Milagro Sanchez MD;  [facility];   Service: Gastroenterology   • DE IMPLANT SPINAL NEUROSTIM/ Left 2018    Procedure: removal of left buttock implantable pulse generator and placement of new  implantable pulse generator;  Surgeon: Clifton Harrington MD;  [facility];  Service: Neurosurgery     Social History   Social History     Substance and Sexual Activity   Alcohol Use Not Currently    Comment: Denied history of alcohol use     Social History     Substance and Sexual Activity   Drug Use No    Comment: Denied history of drug use     Social History     Tobacco Use   Smoking Status Former Smoker   • Types: Cigarettes   • Quit date: 1970   • Years since quittin 8   Smokeless Tobacco Never Used   Tobacco Comment    Denied history of current ever day smoker, Former smoker and Never smoker all documented in [employer]     Family History   Problem Relation Age of Onset   • Lung cancer Mother 55   • Pulmonary embolism Father    • No Known Problems Sister    • No Known Problems Daughter    • No Known Problems Daughter    • Stroke Maternal Grandmother    • Heart attack Maternal Grandfather    • No Known Problems Paternal Grandmother    • No Known Problems Paternal Grandfather    • No Known Problems Maternal Aunt    • Diabetes Family         Diabetes mellitus   • Hypertension Family    • Stroke Family         Stroke complications       Meds/Allergies       Current Outpatient Medications:   •  albuterol (PROVENTIL HFA,VENTOLIN HFA) 90 mcg/act inhaler  •  aspirin 81 mg chewable tablet  •  atorvastatin (LIPITOR) 40 mg tablet  •  baclofen 10 mg tablet  •  escitalopram (Lexapro) 20 mg tablet  •  gabapentin (NEURONTIN) 300 mg capsule  •  levothyroxine 25 mcg tablet  •  lisinopril (ZESTRIL) 20 mg tablet  •  meclizine (ANTIVERT) 25 mg tablet  •  metFORMIN (GLUCOPHAGE) 1000 MG tablet  •  metoclopramide (REGLAN) 10 mg tablet  •  metoprolol tartrate (LOPRESSOR) 25 mg tablet  •  Milnacipran HCl (Savella) 100 MG TABS  •  nystatin (MYCOSTATIN) powder  •  oxyCODONE-acetaminophen (PERCOCET)  mg per tablet  •  pantoprazole (PROTONIX) 40 mg tablet  •  saccharomyces boulardii (FLORASTOR) 250 mg capsule  •  trospium chloride (SANCTURA) 20 mg tablet  •  zolpidem (AMBIEN) 10 mg tablet  •  glucose blood test strip  •  latanoprost (XALATAN) 0 005 % ophthalmic solution  •  zolpidem (AMBIEN) 10 mg tablet  No current facility-administered medications for this encounter  Allergies   Allergen Reactions   • Penicillins Anaphylaxis, Hives and Other (See Comments)     Other reaction(s): Unknown Reaction   • Sulfa Antibiotics Anaphylaxis and Other (See Comments)     Other reaction(s): Unknown Reaction   • Aspartame - Food Allergy Other (See Comments) and Hypertension     Slurred speech, weakness, stroke sx   • Iodinated Diagnostic Agents Hives     Pt has taken prep prior for contrast and has not had any break through reaction   • Keflex [Cephalexin] Hives       Objective     /63   Pulse 76   Temp (!) 96 3 °F (35 7 °C) (Temporal)   Resp 18   Ht 4' 9" (1 448 m)   Wt 77 1 kg (170 lb)   LMP  (LMP Unknown)   SpO2 97%   BMI 36 79 kg/m²       PHYSICAL EXAM    Gen: NAD  Head: NCAT  CV: RRR  CHEST: Clear  ABD: soft, NT/ND  EXT: no edema      ASSESSMENT/PLAN:  This is a 76 y o  year old female here for EGD, and she is stable and optimized for her procedure

## 2022-10-15 ENCOUNTER — TELEPHONE (OUTPATIENT)
Dept: FAMILY MEDICINE CLINIC | Facility: CLINIC | Age: 77
End: 2022-10-15

## 2022-10-15 DIAGNOSIS — J01.90 ACUTE SINUSITIS, RECURRENCE NOT SPECIFIED, UNSPECIFIED LOCATION: Primary | ICD-10-CM

## 2022-10-15 RX ORDER — CIPROFLOXACIN 500 MG/1
500 TABLET, FILM COATED ORAL EVERY 12 HOURS SCHEDULED
Qty: 40 TABLET | Refills: 0 | Status: SHIPPED | OUTPATIENT
Start: 2022-10-15 | End: 2022-10-24 | Stop reason: ALTCHOICE

## 2022-10-18 DIAGNOSIS — G62.9 NEUROPATHY: ICD-10-CM

## 2022-10-18 RX ORDER — GABAPENTIN 300 MG/1
300 CAPSULE ORAL 2 TIMES DAILY
Qty: 60 CAPSULE | Refills: 5
Start: 2022-10-18 | End: 2022-10-24

## 2022-10-24 DIAGNOSIS — M96.1 POST LAMINECTOMY SYNDROME: ICD-10-CM

## 2022-10-24 DIAGNOSIS — G62.9 NEUROPATHY: ICD-10-CM

## 2022-10-24 DIAGNOSIS — R26.81 GAIT INSTABILITY: Primary | ICD-10-CM

## 2022-10-24 DIAGNOSIS — M54.50 LUMBAR BACK PAIN: ICD-10-CM

## 2022-10-24 RX ORDER — OXYCODONE AND ACETAMINOPHEN 10; 325 MG/1; MG/1
1 TABLET ORAL EVERY 6 HOURS PRN
Qty: 120 TABLET | Refills: 0 | Status: SHIPPED | OUTPATIENT
Start: 2022-10-24 | End: 2022-11-25 | Stop reason: SDUPTHER

## 2022-10-24 RX ORDER — GABAPENTIN 300 MG/1
300 CAPSULE ORAL 3 TIMES DAILY
Qty: 90 CAPSULE | Refills: 5 | Status: SHIPPED | OUTPATIENT
Start: 2022-10-24

## 2022-10-27 DIAGNOSIS — R19.7 DIARRHEA, UNSPECIFIED TYPE: Primary | ICD-10-CM

## 2022-10-27 RX ORDER — CHOLESTYRAMINE 4 G/9G
1 POWDER, FOR SUSPENSION ORAL
Qty: 60 PACKET | Refills: 2 | Status: SHIPPED | OUTPATIENT
Start: 2022-10-27

## 2022-11-09 DIAGNOSIS — I10 ESSENTIAL HYPERTENSION: ICD-10-CM

## 2022-11-09 RX ORDER — LISINOPRIL 20 MG/1
20 TABLET ORAL DAILY
Qty: 100 TABLET | Refills: 0 | Status: SHIPPED | OUTPATIENT
Start: 2022-11-09 | End: 2023-02-16

## 2022-11-17 ENCOUNTER — IMMUNIZATIONS (OUTPATIENT)
Dept: FAMILY MEDICINE CLINIC | Facility: CLINIC | Age: 77
End: 2022-11-17

## 2022-11-17 DIAGNOSIS — Z23 NEED FOR VACCINATION: Primary | ICD-10-CM

## 2022-11-22 DIAGNOSIS — E78.00 HIGH CHOLESTEROL: ICD-10-CM

## 2022-11-22 RX ORDER — ATORVASTATIN CALCIUM 40 MG/1
TABLET, FILM COATED ORAL
Qty: 90 TABLET | Refills: 0 | Status: SHIPPED | OUTPATIENT
Start: 2022-11-22

## 2022-11-25 DIAGNOSIS — M54.50 LUMBAR BACK PAIN: ICD-10-CM

## 2022-11-25 DIAGNOSIS — M96.1 POST LAMINECTOMY SYNDROME: ICD-10-CM

## 2022-11-25 RX ORDER — OXYCODONE AND ACETAMINOPHEN 10; 325 MG/1; MG/1
1 TABLET ORAL EVERY 6 HOURS PRN
Qty: 120 TABLET | Refills: 0 | Status: SHIPPED | OUTPATIENT
Start: 2022-11-25 | End: 2022-12-22 | Stop reason: SDUPTHER

## 2022-11-28 ENCOUNTER — EVALUATION (OUTPATIENT)
Dept: PHYSICAL THERAPY | Facility: REHABILITATION | Age: 77
End: 2022-11-28

## 2022-11-28 DIAGNOSIS — R26.81 GAIT INSTABILITY: Primary | ICD-10-CM

## 2022-11-28 NOTE — PROGRESS NOTES
PT Evaluation     Today's date: 2022  Patient name: Meryle Kind  : 1945  MRN: [id number]  Referring provider: NANY Howell  Dx:   Encounter Diagnosis     ICD-10-CM    1  Gait instability  R26 81 Ambulatory Referral to Physical Therapy     PT plan of care cert/re-cert          Start Time: 1030  Stop Time: 1122  Total time in clinic (min): 52 minutes    Assessment  Assessment details: Meryle Kind is a 76y o  year old female who presents with chronic gait and balance deficits and patient reported multiple falls within the past year  Current deficits include impaired LE strength bilaterally, impaired static/dynamic balance, and impaired gait  These deficits impair her ability to ambulate safely and perform all typical I/ADLs, household tasks, and social/recreational activities  Recommend skilled PT services to address previously stated deficits to promote progress towards PLOF  Impairments: abnormal gait, activity intolerance, impaired balance, impaired physical strength and lacks appropriate home exercise program  Understanding of Dx/Px/POC: good   Prognosis: good    Goals  STG (4 weeks):  1  Increase hip flexion strength to at least 4/5 for improved activity tolerance  2  Increase hip abduction strength to at least 4/5 to promote improved activity tolerance  3  Decrease TUG time from 26" to 18" or less to promote progress towards decreased risk for falls  LTG (8 weeks):  1  Increased global LE strength to at least 4/5 to promote improved activity tolerance  2  Able to reach overhead without fear of losing her balance to promote ability to perform household tasks such as reaching into cabinets or closets  3  Able to walk at least 10 minutes with LRAD to promote improved community ambulation  4  Able to ascend/descend 1 flight of stairs without compensation to promote improved household and community ambulation  5  Independent with HEP    6  Increase 30" STS from 5 to 13+ to achieve age appropriate normative values to indicate decreased risk for falls  Plan  Plan details: Thank you for referring Ger Brand to Physical Therapy at [place] [address] and for the opportunity to coordinate care  Patient would benefit from: skilled physical therapy  Planned therapy interventions: abdominal trunk stabilization, activity modification, ADL retraining, balance, balance/weight bearing training, behavior modification, body mechanics training, breathing training, flexibility, functional ROM exercises, gait training, graded activity, graded exercise, graded motor, home exercise program, transfer training, therapeutic training, therapeutic exercise, therapeutic activities, stretching, strengthening, self care, postural training, patient education, neuromuscular re-education, muscle pump exercises, motor coordination training, Razo taping, manual therapy, joint mobilization, IADL retraining and canalith repositioning  Frequency: 2x week  Duration in visits: 10  Plan of Care beginning date: 11/28/2022  Treatment plan discussed with: patient        Subjective Evaluation    History of Present Illness  Mechanism of injury:   11/28/22: Ger Brand presents to skilled physical therapy with complaints of falls and impaired balance  Erin Lopez reports having 3 falls within the last year with the most recent being 2 months ago  States when she's standing she has to stand with her feet wide apart to keep her balance and will have a sense of falling backwards  Since onset, symptoms have stayed the same  Currently, Erin Lopez is having difficulty with walking, reaching for things (up primarily), bending over  Erin Lopez reports using a rollator for approximately 3 months, has previously used a RW and a SPC; Mattie's goal is to be able to walk with a SPC again  Patient admits to intermittent numbness, tingling in right foot and left foot   States she also is experiencing sciatic pain in the R leg for the past few days and has a hx of spinal stenosis  Social Support  Steps to enter house: yes (with bilateral hand rail)  4  Stairs in house: no   Lives in: apartment  Lives with: adult children    Employment status: not working (retired)  Treatments  Current treatment: physical therapy  Patient Goals  Patient goals for therapy: improved balance and increased strength  Patient goal: To be able to walk with a SPC          Objective     Strength/Myotome Testing     Left Hip   Planes of Motion   Flexion: 4-  Abduction: 3+  Adduction: 3-  External rotation: 5  Internal rotation: 4-    Right Hip   Planes of Motion   Flexion: 3+ (sciatic nerve pain)  Abduction: 3+  Adduction: 3-  External rotation: 3+ (sciatic nerve pain)  Internal rotation: 3+    Left Knee   Flexion: 3+  Extension: 4-    Right Knee   Flexion: 3+  Extension: 4-    Left Ankle/Foot   Dorsiflexion: 4  Plantar flexion: 4    Right Ankle/Foot   Dorsiflexion: 4  Plantar flexion: 4  Neuro Exam:     Functional outcomes   Functional outcome comment: 22:  TU" with rollator  30" STS: 5x with both hands on rollator and poor eccentric control  mCTSIB:     Phase 1: 60"    Phase 2: 16"    Phase 3: 17"    Phase 4: NT  Functional outcome gait comment: Use of rollator, short step length bilaterally, wide RUBIN             Precautions: Fall risk, see PHM, max 4 units/day    * Indicates part of HEP     Daily Treatment Diary     Manuals                                            Therapeutic Exercise           Bike  Try nv          Bridges           Clamshells           SLR flexion nv          Standing hip extension nv          Standing hip abduction nv          SLR abduction                                                       Neuro Re-ed           NBOS balance           Tandem balance           SL balance           Tandem walking           Walking with vertical head turns           Walking with horizontal head turns           Backwards walking Patient education done                     Therapeutic Activity           Sit to stands nv          Leg press           Heel raises nv          Toe raises nv          Band resisted side stepping           Monster walks           Forward step ups           Lateral step ups                                                       Modalities

## 2022-12-02 ENCOUNTER — OFFICE VISIT (OUTPATIENT)
Dept: PHYSICAL THERAPY | Facility: REHABILITATION | Age: 77
End: 2022-12-02

## 2022-12-02 DIAGNOSIS — R26.81 GAIT INSTABILITY: Primary | ICD-10-CM

## 2022-12-02 NOTE — PROGRESS NOTES
Daily Note     Today's date: 2022  Patient name: Elin Lincoln  : 1945  MRN: [id number]  Referring provider: Shelda Cooks, CRNP  Dx:   Encounter Diagnosis     ICD-10-CM    1  Gait instability  R26 81                      Subjective: Patiet reports mild right sciatic pain this morning  Objective: See treatment diary below      Assessment: Tolerated treatment well  Patient demonstrated fatigue post treatment, exhibited good technique with therapeutic exercises and would benefit from continued PT  Patient requires UE support with challenged standing balance activities to self correct  Good tolerance to strengthening activities with appropriate fatigue reported  Plan: Continue per plan of care        Precautions: Fall risk, see PHM, max 4 units/day    * Indicates part of HEP     Daily Treatment Diary     Manuals                                           Therapeutic Exercise           Bike (strength/endurance) Try nv 5' lvl 1         Bridges  2x10         Clamshells  Pink  Supine  2x10         SLR flexion nv 2x10         Standing hip extension nv 2x10 ea         Standing hip abduction nv 2x10 ea         SLR abduction                                                       Neuro Re-ed           NBOS balance  20" x3 EO  20"x3 EC         Tandem balance  20" x3 ea         SL balance           Tandem walking           Walking with vertical head turns           Walking with horizontal head turns           Backwards walking                      Patient education done                     Therapeutic Activity           Sit to stands nv 1x10  1x5 w foam boost         Leg press  65# B/L   3x10         Heel raises nv x20         Toe raises nv x20         Band resisted side stepping           Monster walks           Forward step ups  4" x10 ea         Lateral step ups  4" x10 ea                                                     Modalities

## 2022-12-05 ENCOUNTER — OFFICE VISIT (OUTPATIENT)
Dept: PHYSICAL THERAPY | Facility: REHABILITATION | Age: 77
End: 2022-12-05

## 2022-12-05 DIAGNOSIS — R26.81 GAIT INSTABILITY: Primary | ICD-10-CM

## 2022-12-05 NOTE — PROGRESS NOTES
Daily Note     Today's date: 2022  Patient name: Richard Mckinnon  : 1945  MRN: [id number]  Referring provider: NANY Bess  Dx:   Encounter Diagnosis     ICD-10-CM    1  Gait instability  R26 81                      Subjective: Patiet reports she was able to do some shopping yesterday with good tolerance  Legs are a little sore today  Also reports she had increased back pain following last visit but it has subsided as of today  Objective: See treatment diary below      Assessment: Tolerated treatment well  Patient demonstrated fatigue post treatment, exhibited good technique with therapeutic exercises and would benefit from continued PT  Patient demonstrated improved endurance with treatment session  Less rest breaks needed  Plan: Continue per plan of care        Precautions: Fall risk, see PHM, max 4 units/day    * Indicates part of HEP     Daily Treatment Diary     Manuals                                          Therapeutic Exercise           Bike (strength/endurance) Try nv 5' lvl 1 5' lvl 1        Bridges  2x10 2x10        Clamshells  Pink  Supine  2x10 Pink  Supine  2x10        SLR flexion nv 2x10 2x10        Standing hip extension nv 2x10 ea 2x10 ea        Standing hip abduction nv 2x10 ea 2x10 ea        SLR abduction                                                       Neuro Re-ed           NBOS balance  20" x3 EO  20"x3 EC 20" x3 EO w head turns  20"x3 EC        Tandem balance  20" x3 ea 20" x3 ea        SL balance           Tandem walking           Walking with vertical head turns           Walking with horizontal head turns           Backwards walking                      Patient education done                     Therapeutic Activity           Sit to stands nv 1x10  1x5 w foam boost 2x10 w foam boost        Leg press  65# B/L   3x10 nv        Heel raises nv x20 x20        Toe raises nv x20 x20        Band resisted side stepping Festus walks           Forward step ups  4" x10 ea 4" x10 ea        Lateral step ups  4" x10 ea 4" x10 ea                                                    Modalities

## 2022-12-08 ENCOUNTER — OFFICE VISIT (OUTPATIENT)
Dept: PHYSICAL THERAPY | Facility: REHABILITATION | Age: 77
End: 2022-12-08

## 2022-12-08 DIAGNOSIS — R26.81 GAIT INSTABILITY: Primary | ICD-10-CM

## 2022-12-08 NOTE — PROGRESS NOTES
Daily Note     Today's date: 2022  Patient name: Lilia Crain  : 1945  MRN: [id number]  Referring provider: NANY Argueta  Dx:   Encounter Diagnosis     ICD-10-CM    1  Gait instability  R26 81           Start Time:   Stop Time:   Total time in clinic (min): 59 minutes    Subjective: Latonya Dowd reports falling when she was at home after her PT session on Monday  States she was standing at her dryer folding laundry and fell backwards hitting her head  States she notified her MD of the fall  Denies any HA, nausea, vision changes  Reports low back discomfort at start of session stating "my back usually starts to hurt just before dinner time "      Objective: See treatment diary below      Assessment: Tolerated treatment well  Challenged with tandem balance requiring use of BUEs on table; verbal cuing for decreased use of BUE with increased ankle strategy evident following cuing  Patient demonstrated appropriate level of challenge and muscular fatigue throughout session and noted good status at end of visit  Patient demonstrated fatigue post treatment, exhibited good technique with therapeutic exercises and would benefit from continued PT  Plan: Continue per plan of care  Progress treatment as tolerated         Precautions: Fall risk, see PHM, max 4 units/day    * Indicates part of HEP     Daily Treatment Diary     Manuals                                         Therapeutic Exercise           Bike (strength/endurance) Try nv 5' lvl 1 5' lvl 1 5' lvl 0       LTR    15x5"       Bridges  2x10 2x10 3x10       Clamshells  Pink  Supine  2x10 Pink  Supine  2x10 Pink TB 3x10 supine       SLR flexion nv 2x10 2x10 2x10 ea - inc LBP       Standing hip extension nv 2x10 ea 2x10 ea        Standing hip abduction nv 2x10 ea 2x10 ea        SLR abduction                                                       Neuro Re-ed           NBOS balance  20" x3 EO  20"x3 EC 20" x3 EO w head turns  20"x3 EC        Tandem balance  20" x3 ea 20" x3 ea :30x2 ea       SL balance           Tandem walking           Walking with vertical head turns           Walking with horizontal head turns           Backwards walking           Hurdles - fwd           Hurdles - lateral                      Patient education done                     Therapeutic Activity           Sit to stands nv 1x10  1x5 w foam boost 2x10 w foam boost 2x10 from high low table       Leg press  65# B/L   3x10 nv        Heel raises nv x20 x20 3x10       Toe raises nv x20 x20 3x10       Band resisted side stepping           Monster walks           Forward step ups  4" x10 ea 4" x10 ea        Lateral step ups  4" x10 ea 4" x10 ea 4" step 10x ea                                                   Modalities               MHP low back 10' seated

## 2022-12-12 ENCOUNTER — OFFICE VISIT (OUTPATIENT)
Dept: PHYSICAL THERAPY | Facility: REHABILITATION | Age: 77
End: 2022-12-12

## 2022-12-12 DIAGNOSIS — R26.81 GAIT INSTABILITY: Primary | ICD-10-CM

## 2022-12-12 NOTE — PROGRESS NOTES
Daily Note     Today's date: 2022  Patient name: He Cook  : 1945  MRN: [id number]  Referring provider: NANY Rushing  Dx:   Encounter Diagnosis     ICD-10-CM    1  Gait instability  R26 81           Start Time:   Stop Time:   Total time in clinic (min): 35 minutes    Subjective: Raffaele Christy reports she's doing pretty well at start of session; denies any falls since previous session  Objective: See treatment diary below      Assessment: Tolerated treatment well  Increased fatigue this session with increased sets of sit to stands but able to complete with good form; few instances of multiple attempts required to stand but did not require UE assistance  Patient demonstrated appropriate level of challenge and muscular fatigue throughout session and noted good status at end of visit  Updated and reviewed HEP with patient; patient verbalized and demonstrated understanding of all exercises  Patient demonstrated fatigue post treatment, exhibited good technique with therapeutic exercises and would benefit from continued PT  Plan: Continue per plan of care  Progress treatment as tolerated         Precautions: Fall risk, see PHM, max 4 units/day    * Indicates part of HEP   HEP code: [facility]      Daily Treatment Diary     Manuals                                        Therapeutic Exercise           Bike (strength/endurance) Try nv 5' lvl 1 5' lvl 1 5' lvl 0 5' lvl 1      LTR    15x5"       Bridges*  2x10 2x10 3x10       Clamshells*  Pink  Supine  2x10 Pink  Supine  2x10 Pink TB 3x10 supine       SLR flexion nv 2x10 2x10 2x10 ea - inc LBP       Standing hip extension* nv 2x10 ea 2x10 ea        Standing hip abduction* nv 2x10 ea 2x10 ea        SLR abduction                                                       Neuro Re-ed           NBOS balance  20" x3 EO  20"x3 EC 20" x3 EO w head turns  20"x3 EC  :30x1 on foam     nv w/ head turns      Tandem balance  20" x3 ea 20" x3 ea :30x2 ea :45x2 ea      SL balance           Tandem walking           Walking with vertical head turns           Walking with horizontal head turns           Backwards walking           Hurdles - fwd           Hurdles - lateral                      Patient education done                     Therapeutic Activity           Sit to stands nv 1x10  1x5 w foam boost 2x10 w foam boost 2x10 from high low table 3x10 w/ foam boost      Leg press  65# B/L   3x10 nv        Heel raises nv x20 x20 3x10 3x10      Toe raises nv x20 x20 3x10 3x10      Band resisted side stepping           Monster walks           Forward step ups  4" x10 ea 4" x10 ea        Lateral step ups  4" x10 ea 4" x10 ea 4" step 10x ea                                                   Modalities               MHP low back 10' seated

## 2022-12-15 ENCOUNTER — APPOINTMENT (OUTPATIENT)
Dept: PHYSICAL THERAPY | Facility: REHABILITATION | Age: 77
End: 2022-12-15

## 2022-12-19 ENCOUNTER — OFFICE VISIT (OUTPATIENT)
Dept: PHYSICAL THERAPY | Facility: REHABILITATION | Age: 77
End: 2022-12-19

## 2022-12-19 DIAGNOSIS — R26.81 GAIT INSTABILITY: Primary | ICD-10-CM

## 2022-12-19 NOTE — PROGRESS NOTES
Daily Note     Today's date: 2022  Patient name: Cassie Bowen  : 1945  MRN: [id number]  Referring provider: NANY Gagnon  Dx:   Encounter Diagnosis     ICD-10-CM    1  Gait instability  R26 81           Start Time:   Stop Time:   Total time in clinic (min): 45 minutes    Subjective: Marquis Morin reports she's doing well at start of session but does report getting sharp back pain when doing bridges at home  Objective: See treatment diary below      Assessment: Tolerated treatment well  Performed bridges this session to ensure proper form with verbal cuing to avoid lumbar extension and focus of glute engagement with improved tolerance  Noted low back discomfort following sit to stands which was alleviated with seated ball roll outs  Challenged with addition of NBOS balance with head turn requiring use of BUEs to maintain balance  Patient demonstrated appropriate level of challenge and muscular fatigue throughout session and noted good status at end of visit  Patient demonstrated fatigue post treatment, exhibited good technique with therapeutic exercises and would benefit from continued PT  Plan: Continue per plan of care  Progress treatment as tolerated         Precautions: Fall risk, see PHM, max 4 units/day    * Indicates part of HEP   HEP code: [facility]      Daily Treatment Diary     Manuals                                       Therapeutic Exercise           Bike (strength/endurance) Try nv 5' lvl 1 5' lvl 1 5' lvl 0 5' lvl 1 5' lvl 0     Seated ball roll out      10x5"     LTR    15x5"  15x5"     Bridges*  2x10 2x10 3x10  3x10     Clamshells*  Pink  Supine  2x10 Pink  Supine  2x10 Pink TB 3x10 supine       SLR flexion nv 2x10 2x10 2x10 ea - inc LBP       Standing hip extension* nv 2x10 ea 2x10 ea        Standing hip abduction* nv 2x10 ea 2x10 ea        SLR abduction                                                       Neuro Re-ed NBOS balance  20" x3 EO  20"x3 EC 20" x3 EO w head turns  20"x3 EC  :30x1 on foam     nv w/ head turns on foam EO :30x2,  :30x2 w/ hor head turns, :30x2 w/ partha head turms     Tandem balance  20" x3 ea 20" x3 ea :30x2 ea :45x2 ea nv     SL balance           Tandem walking           Walking with vertical head turns      nv     Walking with horizontal head turns      nv     Backwards walking           Hurdles - fwd           Hurdles - lateral                      Patient education done                     Therapeutic Activity           Sit to stands nv 1x10  1x5 w foam boost 2x10 w foam boost 2x10 from high low table 3x10 w/ foam boost 3x10 w/ foam boost     Leg press  65# B/L   3x10 nv        Heel raises nv x20 x20 3x10 3x10 3x10     Toe raises nv x20 x20 3x10 3x10 3x10     Band resisted side stepping           Monster walks           Forward step ups  4" x10 ea 4" x10 ea        Lateral step ups  4" x10 ea 4" x10 ea 4" step 10x ea                                                   Modalities               MHP low back 10' seated

## 2022-12-22 ENCOUNTER — OFFICE VISIT (OUTPATIENT)
Dept: PHYSICAL THERAPY | Facility: REHABILITATION | Age: 77
End: 2022-12-22

## 2022-12-22 DIAGNOSIS — R26.81 GAIT INSTABILITY: Primary | ICD-10-CM

## 2022-12-22 DIAGNOSIS — M54.50 LUMBAR BACK PAIN: ICD-10-CM

## 2022-12-22 DIAGNOSIS — M54.31 BILATERAL SCIATICA: Primary | ICD-10-CM

## 2022-12-22 DIAGNOSIS — M54.32 BILATERAL SCIATICA: Primary | ICD-10-CM

## 2022-12-22 DIAGNOSIS — M96.1 POST LAMINECTOMY SYNDROME: ICD-10-CM

## 2022-12-22 RX ORDER — OXYCODONE AND ACETAMINOPHEN 10; 325 MG/1; MG/1
1 TABLET ORAL EVERY 6 HOURS PRN
Qty: 120 TABLET | Refills: 0 | Status: SHIPPED | OUTPATIENT
Start: 2022-12-22 | End: 2023-01-24

## 2022-12-22 RX ORDER — PREDNISOLONE SODIUM PHOSPHATE 15 MG/5ML
SOLUTION ORAL
Qty: 115 ML | Refills: 0 | Status: SHIPPED | OUTPATIENT
Start: 2022-12-22 | End: 2023-08-28

## 2022-12-22 NOTE — PROGRESS NOTES
Daily Note     Today's date: 2022  Patient name: Natty Peters  : 1945  MRN: [id number]  Referring provider: NANY Shafer  Dx:   Encounter Diagnosis     ICD-10-CM    1  Gait instability  R26 81           Start Time:   Stop Time:   Total time in clinic (min): 60 minutes    Subjective: Yusuf sousa reports she's doing well at start of session  Objective: See treatment diary below      Assessment: Tolerated treatment well  Introduced dynamic balance exercises this session with good tolerance  Utilized rollator with dynamic balance  Adjusted quad cane height this session but discussed use of rollator at home for safety  Patient demonstrated appropriate level of challenge and muscular fatigue throughout session and noted good status at end of visit  Patient demonstrated fatigue post treatment, exhibited good technique with therapeutic exercises and would benefit from continued PT  Plan: Continue per plan of care  Progress treatment as tolerated         Precautions: Fall risk, see PHM, max 4 units/day    * Indicates part of HEP   HEP code: [facility]      Daily Treatment Diary     Manuals                                      Therapeutic Exercise           Bike (strength/endurance) Try nv 5' lvl 1 5' lvl 1 5' lvl 0 5' lvl 1 5' lvl 0 5' lvl 0    Seated ball roll out      10x5" 20x5"    LTR    15x5"  15x5" 15x5"     Bridges*  2x10 2x10 3x10  3x10 3x10     Clamshells*  Pink  Supine  2x10 Pink  Supine  2x10 Pink TB 3x10 supine       SLR flexion nv 2x10 2x10 2x10 ea - inc LBP       Standing hip extension* nv 2x10 ea 2x10 ea        Standing hip abduction* nv 2x10 ea 2x10 ea        SLR abduction                                                       Neuro Re-ed           NBOS balance  20" x3 EO  20"x3 EC 20" x3 EO w head turns  20"x3 EC  :30x1 on foam     nv w/ head turns on foam EO :30x2,  :30x2 w/ hor head turns, :30x2 w/ partha head turms on foam EO  :30x2 w/ hor head turns, :30x2 w/ partha head turns    Tandem balance  20" x3 ea 20" x3 ea :30x2 ea :45x2 ea nv :45x3 ea    SL balance           Tandem walking           Walking with vertical head turns      nv 2 laps    Walking with horizontal head turns      nv 2 laps    Backwards walking           Hurdles - fwd           Hurdles - lateral                      Patient education done                     Therapeutic Activity           Sit to stands nv 1x10  1x5 w foam boost 2x10 w foam boost 2x10 from high low table 3x10 w/ foam boost 3x10 w/ foam boost     Leg press  65# B/L   3x10 nv        Heel raises nv x20 x20 3x10 3x10 3x10     Toe raises nv x20 x20 3x10 3x10 3x10     Band resisted side stepping           Monster walks           Forward step ups  4" x10 ea 4" x10 ea        Lateral step ups  4" x10 ea 4" x10 ea 4" step 10x ea                             Gait Training           Ambulation w/ rollator       2 laps around clinic               Modalities               MHP low back 10' seated

## 2022-12-27 ENCOUNTER — OFFICE VISIT (OUTPATIENT)
Dept: PHYSICAL THERAPY | Facility: REHABILITATION | Age: 77
End: 2022-12-27

## 2022-12-27 DIAGNOSIS — R26.81 GAIT INSTABILITY: Primary | ICD-10-CM

## 2022-12-27 NOTE — PROGRESS NOTES
Daily Note     Today's date: 2022  Patient name: Gloria Duong  : 1945  MRN: [id number]  Referring provider: NANY Cleaning  Dx:   Encounter Diagnosis     ICD-10-CM    1  Gait instability  R26 81                      Subjective: Marcell Newman states she is feeling a little more unstable today than she normally does  Objective: See treatment diary below      Assessment: Tolerated treatment fair as she was limited with fatigue today  Pt was able to focus on standing exercises today  Close supervision was utilized throughout duration of PT session for safety  Patient would benefit from continued PT  Plan: Continue per plan of care  Progress treatment as tolerated         Precautions: Fall risk, see PHM, max 4 units/day    * Indicates part of HEP   HEP code: [facility]      Daily Treatment Diary     Manuals                                Therapeutic Exercise         Bike (strength/  endurance)   5' lvl 1 5' lvl 0 5' lvl 1 5' lvl 0 5' lvl 0 5' lvl 0    Seated ball roll out    10x5" 20x5" 20x 5"    LTR  15x5"  15x5" 15x5"  np   Bridges* 2x10 3x10  3x10 3x10  np   Clamshells* Pink  Supine  2x10 Pink TB 3x10 supine       SLR flexion 2x10 2x10 ea - inc LBP       Standing hip extension* 2x10 ea        Standing hip abduction* 2x10 ea        SLR abduction                                             Neuro Re-ed         NBOS balance 20" x3 EO w head turns  20"x3 EC  :30x1 on foam     nv w/ head turns on foam EO :30x2,  :30x2 w/ hor head turns, :30x2 w/ partha head turms on foam EO  :30x2 w/ hor head turns, :30x2 w/ partha head turns on foam EO  :30x2 w/ hor head turns, :30x2 w/ partha head turns   Tandem balance 20" x3 ea :30x2 ea :45x2 ea nv :45x3 ea :45x3 ea   SL balance         Tandem walking         Walking with vertical head turns    nv 2 laps 2 laps    Walking with horizontal head turns    nv 2 laps 2 laps    Backwards walking         Hurdles - fwd         Hurdles - lateral Patient education                  Therapeutic Activity         Sit to stands 2x10 w foam boost 2x10 from high low table 3x10 w/ foam boost 3x10 w/ foam boost  3 x 10 w/ foam boost    Leg press nv        Heel raises x20 3x10 3x10 3x10  3 x 10   Toe raises x20 3x10 3x10 3x10  3  x10    Band resisted side stepping         Monster walks         Forward step ups 4" x10 ea        Lateral step ups 4" x10 ea 4" step 10x ea                         Gait Training         Ambulation w/ rollator     2 laps around clinic 2 laps around clinic            Modalities           MHP low back 10' seated

## 2022-12-29 ENCOUNTER — OFFICE VISIT (OUTPATIENT)
Dept: PHYSICAL THERAPY | Facility: REHABILITATION | Age: 77
End: 2022-12-29

## 2022-12-29 DIAGNOSIS — R26.81 GAIT INSTABILITY: Primary | ICD-10-CM

## 2022-12-29 NOTE — PROGRESS NOTES
Daily Note     Today's date: 2022  Patient name: Ander Perdomo  : 1945  MRN: [id number]  Referring provider: NANY Mclaughlin  Dx:   Encounter Diagnosis     ICD-10-CM    1  Gait instability  R26 81           Start Time: 1210  Stop Time: 1309  Total time in clinic (min): 59 minutes    Subjective: [place] reports she's doing well at start of session  States she feels she's improving  Objective: See treatment diary below      Assessment: Tolerated treatment well  Increased challenge with walking with head turns this session with progression from rollator to HHA  No significant LOB throughout session  Discussed safe use of rollator at home versus quad cane to decrease risk for falls  Patient demonstrated appropriate level of challenge and muscular fatigue throughout session and noted good status at end of visit  Patient demonstrated fatigue post treatment, exhibited good technique with therapeutic exercises and would benefit from continued PT  Plan: Continue per plan of care  Progress treatment as tolerated         Precautions: Fall risk, see PHM, max 4 units/day    * Indicates part of HEP   HEP code: [facility]      Daily Treatment Diary     Manuals                                      Therapeutic Exercise           Bike (strength/  endurance)   5' lvl 1 5' lvl 0 5' lvl 1 5' lvl 0 5' lvl 0 5' lvl 0  5 lvl 0    Seated ball roll out    10x5" 20x5" 20x 5"  10x5"    LTR  15x5"  15x5" 15x5"  np 15x5"    Bridges* 2x10 3x10  3x10 3x10  np 3x12    Clamshells* Pink  Supine  2x10 Pink TB 3x10 supine         SLR flexion 2x10 2x10 ea - inc LBP         Standing hip extension* 2x10 ea          Standing hip abduction* 2x10 ea          SLR abduction                                                       Neuro Re-ed           NBOS balance 20" x3 EO w head turns  20"x3 EC  :30x1 on foam     nv w/ head turns on foam EO :30x2,  :30x2 w/ hor head turns, :30x2 w/ partha head turms on foam EO  :30x2 w/ hor head turns, :30x2 w/ partha head turns on foam EO  :30x2 w/ hor head turns, :30x2 w/ partha head turns     Tandem balance 20" x3 ea :30x2 ea :45x2 ea nv :45x3 ea :45x3 ea     SL balance           Tandem walking           Walking with vertical head turns    nv 2 laps 2 laps  2 laps w/ HHA    Walking with horizontal head turns    nv 2 laps 2 laps  2 laps w/ HHA    Backwards walking           Hurdles - fwd           Hurdles - lateral                      Patient education                      Therapeutic Activity           Sit to stands 2x10 w foam boost 2x10 from high low table 3x10 w/ foam boost 3x10 w/ foam boost  3 x 10 w/ foam boost      Leg press nv          Heel raises x20 3x10 3x10 3x10  3 x 10     Toe raises x20 3x10 3x10 3x10  3  x10      Band resisted side stepping           Monster walks           Forward step ups 4" x10 ea          Lateral step ups 4" x10 ea 4" step 10x ea                               Gait Training           Ambulation w/ rollator     2 laps around clinic 2 laps around clinic 2 laps around clinic    Ambulation w/ HHA       2 laps               Modalities             MHP low back 10' seated

## 2023-01-04 DIAGNOSIS — I10 PRIMARY HYPERTENSION: ICD-10-CM

## 2023-01-08 DIAGNOSIS — N39.0 URINARY TRACT INFECTION WITHOUT HEMATURIA, SITE UNSPECIFIED: Primary | ICD-10-CM

## 2023-01-08 RX ORDER — DOXYCYCLINE HYCLATE 100 MG/1
100 CAPSULE ORAL EVERY 12 HOURS SCHEDULED
Qty: 20 CAPSULE | Refills: 0 | Status: SHIPPED | OUTPATIENT
Start: 2023-01-08 | End: 2023-01-18

## 2023-01-18 DIAGNOSIS — F51.01 PRIMARY INSOMNIA: ICD-10-CM

## 2023-01-18 RX ORDER — ZOLPIDEM TARTRATE 10 MG/1
10 TABLET ORAL
Qty: 30 TABLET | Refills: 3 | Status: SHIPPED | OUTPATIENT
Start: 2023-01-18 | End: 2023-03-08 | Stop reason: DRUGHIGH

## 2023-01-24 DIAGNOSIS — R60.0 LOCALIZED EDEMA: Primary | ICD-10-CM

## 2023-01-24 DIAGNOSIS — M54.50 LUMBAR BACK PAIN: ICD-10-CM

## 2023-01-24 DIAGNOSIS — M96.1 POST LAMINECTOMY SYNDROME: ICD-10-CM

## 2023-01-24 RX ORDER — OXYCODONE AND ACETAMINOPHEN 10; 325 MG/1; MG/1
1 TABLET ORAL EVERY 6 HOURS PRN
Qty: 120 TABLET | Refills: 0 | Status: SHIPPED | OUTPATIENT
Start: 2023-01-24 | End: 2023-02-28 | Stop reason: SDUPTHER

## 2023-01-24 RX ORDER — HYDROCHLOROTHIAZIDE 12.5 MG/1
25 TABLET ORAL DAILY PRN
Qty: 30 TABLET | Refills: 3 | Status: SHIPPED | OUTPATIENT
Start: 2023-01-24 | End: 2023-08-28

## 2023-02-02 ENCOUNTER — EVALUATION (OUTPATIENT)
Dept: PHYSICAL THERAPY | Facility: REHABILITATION | Age: 78
End: 2023-02-02

## 2023-02-02 DIAGNOSIS — R26.81 GAIT INSTABILITY: Primary | ICD-10-CM

## 2023-02-02 NOTE — PROGRESS NOTES
PT Re-Evaluation     Today's date: 2023  Patient name: Cassie Bowen  : 1945  MRN: [id number]  Referring provider: NANY Gagnon  Dx:   Encounter Diagnosis     ICD-10-CM    1  Gait instability  R26 81           Start Time: 3604  Stop Time: 0752  Total time in clinic (min): 48 minutes    Assessment  Assessment details: Re-evaluation performed to re-establish care  Per patient report and clinical findings, Cassie Bowen has made good progress since starting skilled physical therapy services  Cassie Bowen had been compliant with PT POC however was unable to attend PT for the past month secondary to being sick  To this date, Cassie Bowen has completed 9 visits of skilled physical therapy  Cassie Bowen demonstrates improvements in objective data however, continues to be limited compared to her prior level of function  Remaining deficits include impaired static/dynamic balance, impaired distal LE strength, and impaired gait  Recommend continued skilled physical therapy services to address remaining deficits to promote progress towards her prior level of function  Impairments: abnormal gait, activity intolerance, impaired balance, impaired physical strength and lacks appropriate home exercise program  Understanding of Dx/Px/POC: good   Prognosis: good    Goals  STG (4 weeks):  1  Increase hip flexion strength to at least 4/5 for improved activity tolerance  - met  2  Increase hip abduction strength to at least 4/5 to promote improved activity tolerance  - met  3  Decrease TUG time from 26" to 18" or less to promote progress towards decreased risk for falls  - progressing    LTG (8 weeks):  1  Increased global LE strength to at least 4/5 to promote improved activity tolerance  - progressing  2  Able to reach overhead without fear of losing her balance to promote ability to perform household tasks such as reaching into cabinets or closets  - progressing  3   Able to walk at least 10 minutes with LRAD to promote improved community ambulation  - progressing  4  Able to ascend/descend 1 flight of stairs without compensation to promote improved household and community ambulation  - progressing  5  Independent with HEP  - progressing  6  Increase 30" STS from 5 to 13+ to achieve age appropriate normative values to indicate decreased risk for falls  - progressing    Plan  Plan details: Thank you for referring Cristino Pizano to Physical Therapy at [place] [address] and for the opportunity to coordinate care  Patient would benefit from: skilled physical therapy  Planned therapy interventions: abdominal trunk stabilization, activity modification, ADL retraining, balance, balance/weight bearing training, behavior modification, body mechanics training, breathing training, flexibility, functional ROM exercises, gait training, graded activity, graded exercise, graded motor, home exercise program, transfer training, therapeutic training, therapeutic exercise, therapeutic activities, stretching, strengthening, self care, postural training, patient education, neuromuscular re-education, muscle pump exercises, motor coordination training, Razo taping, manual therapy, joint mobilization, IADL retraining and canalith repositioning  Frequency: 2x week  Duration in visits: 10  Plan of Care beginning date: 2/2/2023  Treatment plan discussed with: patient        Subjective Evaluation    History of Present Illness  Mechanism of injury: 02/02/23:  Cristino Pizano reports feeling she has made 60% progress since IE  Terrance Simmons reports improvements in balance; states she's been in situations where she feels she would've lost her balance before but doesn't now  Also notes that she's able to walk 4 aisles in the grocery store now  Feels that she was able to maintain most of her progress even though she wasn't able to come in for the past month   Despite improvements, Terrance Simmons reports she feels she needs to continue to work on strength and balance and would like to be able to walk with a cane  States she started to use her RW because she wants a challenge and using the rollator is easy for her now  11/28/22: Lori Ricky presents to skilled physical therapy with complaints of falls and impaired balance  Megan Ibarra reports having 3 falls within the last year with the most recent being 2 months ago  States when she's standing she has to stand with her feet wide apart to keep her balance and will have a sense of falling backwards  Since onset, symptoms have stayed the same  Currently, Megan Ibarra is having difficulty with walking, reaching for things (up primarily), bending over  Megan Ibarra reports using a rollator for approximately 3 months, has previously used a RW and a SPC; Mattie's goal is to be able to walk with a SPC again  Patient admits to intermittent numbness, tingling in right foot and left foot  States she also is experiencing sciatic pain in the R leg for the past few days and has a hx of spinal stenosis  Social Support  Steps to enter house: yes (with bilateral hand rail)  4  Stairs in house: no   Lives in: apartment  Lives with: adult children    Employment status: not working (retired)  Treatments  Current treatment: physical therapy  Patient Goals  Patient goals for therapy: improved balance and increased strength  Patient goal: To be able to walk with a SPC          Objective     Strength/Myotome Testing     Left Hip   Planes of Motion   Flexion: 4+  Abduction: 5  Adduction: 4  External rotation: 5  Internal rotation: 4    Right Hip   Planes of Motion   Flexion: 4  Abduction: 5  Adduction: 4  External rotation: 4+ (sciatic nerve pain)  Internal rotation: 4 (sciatic nerve pain)    Left Knee   Flexion: 4  Extension: 4    Right Knee   Flexion: 4- (sciatic nerve pain)  Extension: 4    Left Ankle/Foot   Dorsiflexion: 4+  Plantar flexion: 4+    Right Ankle/Foot   Dorsiflexion: 4+  Plantar flexion: 4+    Tests     Lumbar Left   Negative slump test      Right   Negative slump test    Neuro Exam:     Functional outcomes   Functional outcome comment: 23:  TU" with RW  30" STS: 5x with both hands on RW and poor eccentric control, one rep not counted where she was able to stand 3/4 of the way but lost control and sat backwards onto chair     mCTSIB:     Phase 1: 60"    Phase 2: 10"    Phase 3: 60"    Phase 4: NT      22:  TU" with rollator  30" STS: 5x with both hands on rollator and poor eccentric control  mCTSIB:     Phase 1: 60"    Phase 2: 16"    Phase 3: 17"    Phase 4: NT  Functional outcome gait comment: Use of rollator, short step length bilaterally, wide RUBIN             Precautions: Fall risk, see PHM, max 4 units/day    * Indicates part of HEP   HEP code: [facility]       Daily Treatment Diary     Manuals                                     Therapeutic Exercise           Bike (strength/  endurance)   5' lvl 1 5' lvl 0 5' lvl 1 5' lvl 0 5' lvl 0 5' lvl 0  5 lvl 0 5' lvl 1   Seated ball roll out*    10x5" 20x5" 20x 5"  10x5" 2x10x5"   LTR*  15x5"  15x5" 15x5"  np 15x5"    Bridges* 2x10 3x10  3x10 3x10  np 3x12    Clamshells* Pink  Supine  2x10 Pink TB 3x10 supine         SLR flexion 2x10 2x10 ea - inc LBP         Standing hip extension* 2x10 ea          Standing hip abduction* 2x10 ea          SLR abduction                                                       Neuro Re-ed           NBOS balance 20" x3 EO w head turns  20"x3 EC  :30x1 on foam     nv w/ head turns on foam EO :30x2,  :30x2 w/ hor head turns, :30x2 w/ partha head turms on foam EO  :30x2 w/ hor head turns, :30x2 w/ partha head turns on foam EO  :30x2 w/ hor head turns, :30x2 w/ partha head turns     Tandem balance* 20" x3 ea :30x2 ea :45x2 ea nv :45x3 ea :45x3 ea     SL balance           Tandem walking           Walking with vertical head turns    nv 2 laps 2 laps  2 laps w/ HHA    Walking with horizontal head turns    nv 2 laps 2 laps  2 laps w/ HHA    Backwards walking           Hurdles - fwd           Hurdles - lateral                      Patient education        done              Therapeutic Activity           Sit to stands 2x10 w foam boost 2x10 from high low table 3x10 w/ foam boost 3x10 w/ foam boost  3 x 10 w/ foam boost      Leg press nv          Heel raises* x20 3x10 3x10 3x10  3 x 10     Toe raises* x20 3x10 3x10 3x10  3  x10      Band resisted side stepping           Monster walks           Forward step ups 4" x10 ea          Lateral step ups 4" x10 ea 4" step 10x ea                               Gait Training           Ambulation w/ rollator     2 laps around clinic 2 laps around clinic 2 laps around clinic    Ambulation w/ HHA       2 laps               Modalities             MHP low back 10' seated

## 2023-02-06 ENCOUNTER — APPOINTMENT (OUTPATIENT)
Dept: PHYSICAL THERAPY | Facility: REHABILITATION | Age: 78
End: 2023-02-06

## 2023-02-10 DIAGNOSIS — E13.9 DIABETES 1.5, MANAGED AS TYPE 2 (HCC): ICD-10-CM

## 2023-02-16 DIAGNOSIS — I10 ESSENTIAL HYPERTENSION: ICD-10-CM

## 2023-02-16 RX ORDER — LISINOPRIL 20 MG/1
TABLET ORAL
Qty: 100 TABLET | Refills: 0 | Status: SHIPPED | OUTPATIENT
Start: 2023-02-16

## 2023-02-17 DIAGNOSIS — R60.0 LOCALIZED EDEMA: Primary | ICD-10-CM

## 2023-02-20 ENCOUNTER — APPOINTMENT (OUTPATIENT)
Dept: LAB | Facility: AMBULARY SURGERY CENTER | Age: 78
End: 2023-02-20

## 2023-02-20 DIAGNOSIS — R53.83 OTHER FATIGUE: ICD-10-CM

## 2023-02-20 DIAGNOSIS — N39.0 URINARY TRACT INFECTION WITHOUT HEMATURIA, SITE UNSPECIFIED: ICD-10-CM

## 2023-02-20 DIAGNOSIS — R19.7 DIARRHEA, UNSPECIFIED TYPE: ICD-10-CM

## 2023-02-20 DIAGNOSIS — E11.9 TYPE 2 DIABETES MELLITUS WITHOUT COMPLICATION, WITHOUT LONG-TERM CURRENT USE OF INSULIN (HCC): ICD-10-CM

## 2023-02-20 DIAGNOSIS — E11.9 TYPE 2 DIABETES MELLITUS WITHOUT COMPLICATION, WITHOUT LONG-TERM CURRENT USE OF INSULIN (HCC): Primary | ICD-10-CM

## 2023-02-20 LAB
ANION GAP SERPL CALCULATED.3IONS-SCNC: 7 MMOL/L (ref 4–13)
BASOPHILS # BLD AUTO: 0.04 THOUSANDS/ÂΜL (ref 0–0.1)
BASOPHILS NFR BLD AUTO: 1 % (ref 0–1)
BUN SERPL-MCNC: 31 MG/DL (ref 5–25)
CALCIUM SERPL-MCNC: 9.6 MG/DL (ref 8.3–10.1)
CHLORIDE SERPL-SCNC: 109 MMOL/L (ref 96–108)
CO2 SERPL-SCNC: 25 MMOL/L (ref 21–32)
CREAT SERPL-MCNC: 1.62 MG/DL (ref 0.6–1.3)
EOSINOPHIL # BLD AUTO: 0.18 THOUSAND/ÂΜL (ref 0–0.61)
EOSINOPHIL NFR BLD AUTO: 3 % (ref 0–6)
ERYTHROCYTE [DISTWIDTH] IN BLOOD BY AUTOMATED COUNT: 14.1 % (ref 11.6–15.1)
GFR SERPL CREATININE-BSD FRML MDRD: 29 ML/MIN/1.73SQ M
GLUCOSE SERPL-MCNC: 212 MG/DL (ref 65–140)
HCT VFR BLD AUTO: 35.5 % (ref 34.8–46.1)
HGB BLD-MCNC: 10.6 G/DL (ref 11.5–15.4)
IMM GRANULOCYTES # BLD AUTO: 0.01 THOUSAND/UL (ref 0–0.2)
IMM GRANULOCYTES NFR BLD AUTO: 0 % (ref 0–2)
LYMPHOCYTES # BLD AUTO: 1.5 THOUSANDS/ÂΜL (ref 0.6–4.47)
LYMPHOCYTES NFR BLD AUTO: 25 % (ref 14–44)
MAGNESIUM SERPL-MCNC: 2 MG/DL (ref 1.6–2.6)
MCH RBC QN AUTO: 27.5 PG (ref 26.8–34.3)
MCHC RBC AUTO-ENTMCNC: 29.9 G/DL (ref 31.4–37.4)
MCV RBC AUTO: 92 FL (ref 82–98)
MONOCYTES # BLD AUTO: 0.5 THOUSAND/ÂΜL (ref 0.17–1.22)
MONOCYTES NFR BLD AUTO: 9 % (ref 4–12)
NEUTROPHILS # BLD AUTO: 3.67 THOUSANDS/ÂΜL (ref 1.85–7.62)
NEUTS SEG NFR BLD AUTO: 62 % (ref 43–75)
NRBC BLD AUTO-RTO: 0 /100 WBCS
PLATELET # BLD AUTO: 235 THOUSANDS/UL (ref 149–390)
PMV BLD AUTO: 11.7 FL (ref 8.9–12.7)
POTASSIUM SERPL-SCNC: 4.3 MMOL/L (ref 3.5–5.3)
RBC # BLD AUTO: 3.85 MILLION/UL (ref 3.81–5.12)
SODIUM SERPL-SCNC: 141 MMOL/L (ref 135–147)
WBC # BLD AUTO: 5.9 THOUSAND/UL (ref 4.31–10.16)

## 2023-02-20 RX ORDER — BLOOD SUGAR DIAGNOSTIC
STRIP MISCELLANEOUS
Qty: 200 EACH | Refills: 3 | Status: SHIPPED | OUTPATIENT
Start: 2023-02-20

## 2023-02-20 RX ORDER — BLOOD-GLUCOSE METER
KIT MISCELLANEOUS
Qty: 1 KIT | Refills: 0 | Status: SHIPPED | OUTPATIENT
Start: 2023-02-20

## 2023-02-20 RX ORDER — LANCETS 33 GAUGE
EACH MISCELLANEOUS
Qty: 200 EACH | Refills: 3 | Status: SHIPPED | OUTPATIENT
Start: 2023-02-20

## 2023-02-21 ENCOUNTER — APPOINTMENT (OUTPATIENT)
Dept: LAB | Facility: AMBULARY SURGERY CENTER | Age: 78
End: 2023-02-21

## 2023-02-21 LAB
BACTERIA UR QL AUTO: ABNORMAL /HPF
BILIRUB UR QL STRIP: NEGATIVE
CLARITY UR: CLEAR
COLOR UR: YELLOW
GLUCOSE UR STRIP-MCNC: NEGATIVE MG/DL
HGB UR QL STRIP.AUTO: NEGATIVE
HYALINE CASTS #/AREA URNS LPF: ABNORMAL /LPF
KETONES UR STRIP-MCNC: NEGATIVE MG/DL
LEUKOCYTE ESTERASE UR QL STRIP: NEGATIVE
NITRITE UR QL STRIP: NEGATIVE
NON-SQ EPI CELLS URNS QL MICRO: ABNORMAL /HPF
PH UR STRIP.AUTO: 5.5 [PH]
PROT UR STRIP-MCNC: ABNORMAL MG/DL
RBC #/AREA URNS AUTO: ABNORMAL /HPF
SP GR UR STRIP.AUTO: 1.02 (ref 1–1.03)
UROBILINOGEN UR STRIP-ACNC: <2 MG/DL
WBC #/AREA URNS AUTO: ABNORMAL /HPF

## 2023-02-22 DIAGNOSIS — N39.0 URINARY TRACT INFECTION WITHOUT HEMATURIA, SITE UNSPECIFIED: Primary | ICD-10-CM

## 2023-02-22 LAB
EST. AVERAGE GLUCOSE BLD GHB EST-MCNC: 174 MG/DL
HBA1C MFR BLD: 7.7 %

## 2023-02-22 RX ORDER — DOXYCYCLINE HYCLATE 100 MG/1
100 CAPSULE ORAL EVERY 12 HOURS SCHEDULED
Qty: 20 CAPSULE | Refills: 0 | Status: SHIPPED | OUTPATIENT
Start: 2023-02-22 | End: 2023-02-26

## 2023-02-23 ENCOUNTER — APPOINTMENT (EMERGENCY)
Dept: RADIOLOGY | Facility: HOSPITAL | Age: 78
End: 2023-02-23

## 2023-02-23 ENCOUNTER — OFFICE VISIT (OUTPATIENT)
Dept: PHYSICAL THERAPY | Facility: REHABILITATION | Age: 78
End: 2023-02-23

## 2023-02-23 ENCOUNTER — APPOINTMENT (EMERGENCY)
Dept: CT IMAGING | Facility: HOSPITAL | Age: 78
End: 2023-02-23

## 2023-02-23 ENCOUNTER — HOSPITAL ENCOUNTER (INPATIENT)
Facility: HOSPITAL | Age: 78
LOS: 3 days | Discharge: HOME | End: 2023-02-26
Attending: EMERGENCY MEDICINE | Admitting: INTERNAL MEDICINE

## 2023-02-23 DIAGNOSIS — R26.81 GAIT INSTABILITY: Primary | ICD-10-CM

## 2023-02-23 DIAGNOSIS — R53.1 ASTHENIA: ICD-10-CM

## 2023-02-23 DIAGNOSIS — R41.82 AMS (ALTERED MENTAL STATUS): Primary | ICD-10-CM

## 2023-02-23 DIAGNOSIS — R26.2 AMBULATORY DYSFUNCTION: ICD-10-CM

## 2023-02-23 DIAGNOSIS — R19.7 DIARRHEA: ICD-10-CM

## 2023-02-23 DIAGNOSIS — I63.9 CEREBROVASCULAR ACCIDENT (CVA), UNSPECIFIED MECHANISM (HCC): ICD-10-CM

## 2023-02-23 DIAGNOSIS — N17.9 AKI (ACUTE KIDNEY INJURY) (HCC): ICD-10-CM

## 2023-02-23 DIAGNOSIS — E86.0 DEHYDRATION: ICD-10-CM

## 2023-02-23 DIAGNOSIS — R10.13 DYSPEPSIA: ICD-10-CM

## 2023-02-23 DIAGNOSIS — M48.061 DEGENERATIVE LUMBAR SPINAL STENOSIS: ICD-10-CM

## 2023-02-23 DIAGNOSIS — R41.0 CONFUSION: ICD-10-CM

## 2023-02-23 PROBLEM — D64.9 NORMOCYTIC ANEMIA: Chronic | Status: ACTIVE | Noted: 2023-02-23

## 2023-02-23 PROBLEM — R60.0 BILATERAL LOWER EXTREMITY EDEMA: Chronic | Status: ACTIVE | Noted: 2023-02-23

## 2023-02-23 LAB
2HR DELTA HS TROPONIN: 0 NG/L
4HR DELTA HS TROPONIN: -1 NG/L
ALBUMIN SERPL BCP-MCNC: 3.7 G/DL (ref 3.5–5)
ALP SERPL-CCNC: 89 U/L (ref 34–104)
ALT SERPL W P-5'-P-CCNC: 9 U/L (ref 7–52)
ANION GAP SERPL CALCULATED.3IONS-SCNC: 7 MMOL/L (ref 4–13)
APTT PPP: 30 SECONDS (ref 23–37)
AST SERPL W P-5'-P-CCNC: 16 U/L (ref 13–39)
ATRIAL RATE: 85 BPM
BASOPHILS # BLD AUTO: 0.04 THOUSANDS/ÂΜL (ref 0–0.1)
BASOPHILS NFR BLD AUTO: 1 % (ref 0–1)
BILIRUB SERPL-MCNC: 0.39 MG/DL (ref 0.2–1)
BILIRUB UR QL STRIP: NEGATIVE
BUN SERPL-MCNC: 40 MG/DL (ref 5–25)
CALCIUM SERPL-MCNC: 9.7 MG/DL (ref 8.4–10.2)
CARDIAC TROPONIN I PNL SERPL HS: 8 NG/L
CARDIAC TROPONIN I PNL SERPL HS: 9 NG/L
CARDIAC TROPONIN I PNL SERPL HS: 9 NG/L
CHLORIDE SERPL-SCNC: 103 MMOL/L (ref 96–108)
CLARITY UR: CLEAR
CO2 SERPL-SCNC: 28 MMOL/L (ref 21–32)
COLOR UR: NORMAL
CREAT SERPL-MCNC: 1.68 MG/DL (ref 0.6–1.3)
EOSINOPHIL # BLD AUTO: 0.25 THOUSAND/ÂΜL (ref 0–0.61)
EOSINOPHIL NFR BLD AUTO: 4 % (ref 0–6)
ERYTHROCYTE [DISTWIDTH] IN BLOOD BY AUTOMATED COUNT: 13.8 % (ref 11.6–15.1)
GFR SERPL CREATININE-BSD FRML MDRD: 28 ML/MIN/1.73SQ M
GLUCOSE SERPL-MCNC: 99 MG/DL (ref 65–140)
GLUCOSE UR STRIP-MCNC: NEGATIVE MG/DL
HCT VFR BLD AUTO: 32.8 % (ref 34.8–46.1)
HGB BLD-MCNC: 10 G/DL (ref 11.5–15.4)
HGB UR QL STRIP.AUTO: NEGATIVE
IMM GRANULOCYTES # BLD AUTO: 0.02 THOUSAND/UL (ref 0–0.2)
IMM GRANULOCYTES NFR BLD AUTO: 0 % (ref 0–2)
INR PPP: 0.93 (ref 0.84–1.19)
KETONES UR STRIP-MCNC: NEGATIVE MG/DL
LACTATE SERPL-SCNC: 1.3 MMOL/L (ref 0.5–2)
LEUKOCYTE ESTERASE UR QL STRIP: NEGATIVE
LYMPHOCYTES # BLD AUTO: 1.56 THOUSANDS/ÂΜL (ref 0.6–4.47)
LYMPHOCYTES NFR BLD AUTO: 23 % (ref 14–44)
MAGNESIUM SERPL-MCNC: 2.3 MG/DL (ref 1.9–2.7)
MCH RBC QN AUTO: 28.2 PG (ref 26.8–34.3)
MCHC RBC AUTO-ENTMCNC: 30.5 G/DL (ref 31.4–37.4)
MCV RBC AUTO: 93 FL (ref 82–98)
MONOCYTES # BLD AUTO: 0.76 THOUSAND/ÂΜL (ref 0.17–1.22)
MONOCYTES NFR BLD AUTO: 11 % (ref 4–12)
NEUTROPHILS # BLD AUTO: 4.2 THOUSANDS/ÂΜL (ref 1.85–7.62)
NEUTS SEG NFR BLD AUTO: 61 % (ref 43–75)
NITRITE UR QL STRIP: NEGATIVE
NRBC BLD AUTO-RTO: 0 /100 WBCS
P AXIS: 37 DEGREES
PH UR STRIP.AUTO: 5 [PH]
PLATELET # BLD AUTO: 222 THOUSANDS/UL (ref 149–390)
PMV BLD AUTO: 11.1 FL (ref 8.9–12.7)
POTASSIUM SERPL-SCNC: 5 MMOL/L (ref 3.5–5.3)
PR INTERVAL: 166 MS
PROT SERPL-MCNC: 6.3 G/DL (ref 6.4–8.4)
PROT UR STRIP-MCNC: NEGATIVE MG/DL
PROTHROMBIN TIME: 12.6 SECONDS (ref 11.6–14.5)
QRS AXIS: -36 DEGREES
QRSD INTERVAL: 136 MS
QT INTERVAL: 426 MS
QTC INTERVAL: 506 MS
RBC # BLD AUTO: 3.54 MILLION/UL (ref 3.81–5.12)
SODIUM SERPL-SCNC: 138 MMOL/L (ref 135–147)
SP GR UR STRIP.AUTO: 1.02 (ref 1–1.03)
T WAVE AXIS: 99 DEGREES
TSH SERPL DL<=0.05 MIU/L-ACNC: 3.19 UIU/ML (ref 0.45–4.5)
UROBILINOGEN UR STRIP-ACNC: <2 MG/DL
VENTRICULAR RATE: 85 BPM
WBC # BLD AUTO: 6.83 THOUSAND/UL (ref 4.31–10.16)

## 2023-02-23 RX ORDER — GABAPENTIN 300 MG/1
300 CAPSULE ORAL 3 TIMES DAILY
Status: DISCONTINUED | OUTPATIENT
Start: 2023-02-23 | End: 2023-02-23

## 2023-02-23 RX ORDER — OXYBUTYNIN CHLORIDE 5 MG/1
5 TABLET ORAL 2 TIMES DAILY
Status: DISCONTINUED | OUTPATIENT
Start: 2023-02-23 | End: 2023-02-26 | Stop reason: HOSPADM

## 2023-02-23 RX ORDER — ACETAMINOPHEN 325 MG/1
650 TABLET ORAL EVERY 6 HOURS PRN
Status: DISCONTINUED | OUTPATIENT
Start: 2023-02-23 | End: 2023-02-26 | Stop reason: HOSPADM

## 2023-02-23 RX ORDER — PANTOPRAZOLE SODIUM 40 MG/1
40 TABLET, DELAYED RELEASE ORAL EVERY 12 HOURS
Status: DISCONTINUED | OUTPATIENT
Start: 2023-02-23 | End: 2023-02-26 | Stop reason: HOSPADM

## 2023-02-23 RX ORDER — ALBUTEROL SULFATE 90 UG/1
2 AEROSOL, METERED RESPIRATORY (INHALATION) EVERY 6 HOURS PRN
Status: DISCONTINUED | OUTPATIENT
Start: 2023-02-23 | End: 2023-02-26 | Stop reason: HOSPADM

## 2023-02-23 RX ORDER — LISINOPRIL 20 MG/1
20 TABLET ORAL DAILY
Status: DISCONTINUED | OUTPATIENT
Start: 2023-02-24 | End: 2023-02-24

## 2023-02-23 RX ORDER — ACETAMINOPHEN 325 MG/1
325 TABLET ORAL EVERY 6 HOURS PRN
Status: DISCONTINUED | OUTPATIENT
Start: 2023-02-23 | End: 2023-02-26 | Stop reason: HOSPADM

## 2023-02-23 RX ORDER — NYSTATIN 100000 [USP'U]/G
POWDER TOPICAL 2 TIMES DAILY
Status: DISCONTINUED | OUTPATIENT
Start: 2023-02-23 | End: 2023-02-26 | Stop reason: HOSPADM

## 2023-02-23 RX ORDER — INSULIN LISPRO 100 [IU]/ML
1-5 INJECTION, SOLUTION INTRAVENOUS; SUBCUTANEOUS
Status: DISCONTINUED | OUTPATIENT
Start: 2023-02-23 | End: 2023-02-26 | Stop reason: HOSPADM

## 2023-02-23 RX ORDER — ENOXAPARIN SODIUM 100 MG/ML
30 INJECTION SUBCUTANEOUS DAILY
Status: DISCONTINUED | OUTPATIENT
Start: 2023-02-24 | End: 2023-02-23

## 2023-02-23 RX ORDER — OXYCODONE HYDROCHLORIDE 10 MG/1
10 TABLET ORAL EVERY 6 HOURS PRN
Status: DISCONTINUED | OUTPATIENT
Start: 2023-02-23 | End: 2023-02-26 | Stop reason: HOSPADM

## 2023-02-23 RX ORDER — GABAPENTIN 300 MG/1
300 CAPSULE ORAL DAILY PRN
Status: DISCONTINUED | OUTPATIENT
Start: 2023-02-23 | End: 2023-02-26 | Stop reason: HOSPADM

## 2023-02-23 RX ORDER — OXYCODONE AND ACETAMINOPHEN 10; 325 MG/1; MG/1
1 TABLET ORAL EVERY 6 HOURS PRN
Status: DISCONTINUED | OUTPATIENT
Start: 2023-02-23 | End: 2023-02-23

## 2023-02-23 RX ORDER — MECLIZINE HYDROCHLORIDE 25 MG/1
25 TABLET ORAL 4 TIMES DAILY PRN
Status: DISCONTINUED | OUTPATIENT
Start: 2023-02-23 | End: 2023-02-26 | Stop reason: HOSPADM

## 2023-02-23 RX ORDER — ASPIRIN 81 MG/1
81 TABLET, CHEWABLE ORAL DAILY
Status: DISCONTINUED | OUTPATIENT
Start: 2023-02-24 | End: 2023-02-26 | Stop reason: HOSPADM

## 2023-02-23 RX ORDER — METOCLOPRAMIDE 10 MG/1
10 TABLET ORAL 4 TIMES DAILY PRN
Status: DISCONTINUED | OUTPATIENT
Start: 2023-02-23 | End: 2023-02-26 | Stop reason: HOSPADM

## 2023-02-23 RX ORDER — ZOLPIDEM TARTRATE 5 MG/1
10 TABLET ORAL
Status: DISCONTINUED | OUTPATIENT
Start: 2023-02-23 | End: 2023-02-26 | Stop reason: HOSPADM

## 2023-02-23 RX ORDER — ENOXAPARIN SODIUM 100 MG/ML
40 INJECTION SUBCUTANEOUS DAILY
Status: DISCONTINUED | OUTPATIENT
Start: 2023-02-24 | End: 2023-02-23

## 2023-02-23 RX ORDER — SODIUM CHLORIDE, SODIUM GLUCONATE, SODIUM ACETATE, POTASSIUM CHLORIDE, MAGNESIUM CHLORIDE, SODIUM PHOSPHATE, DIBASIC, AND POTASSIUM PHOSPHATE .53; .5; .37; .037; .03; .012; .00082 G/100ML; G/100ML; G/100ML; G/100ML; G/100ML; G/100ML; G/100ML
75 INJECTION, SOLUTION INTRAVENOUS CONTINUOUS
Status: DISCONTINUED | OUTPATIENT
Start: 2023-02-23 | End: 2023-02-24

## 2023-02-23 RX ORDER — HEPARIN SODIUM 5000 [USP'U]/ML
5000 INJECTION, SOLUTION INTRAVENOUS; SUBCUTANEOUS EVERY 8 HOURS SCHEDULED
Status: DISCONTINUED | OUTPATIENT
Start: 2023-02-24 | End: 2023-02-26 | Stop reason: HOSPADM

## 2023-02-23 RX ORDER — BACLOFEN 10 MG/1
10 TABLET ORAL 3 TIMES DAILY PRN
Status: DISCONTINUED | OUTPATIENT
Start: 2023-02-23 | End: 2023-02-26 | Stop reason: HOSPADM

## 2023-02-23 RX ORDER — SACCHAROMYCES BOULARDII 250 MG
250 CAPSULE ORAL 2 TIMES DAILY
Status: DISCONTINUED | OUTPATIENT
Start: 2023-02-23 | End: 2023-02-26 | Stop reason: HOSPADM

## 2023-02-23 RX ORDER — LATANOPROST 50 UG/ML
1 SOLUTION/ DROPS OPHTHALMIC
Status: DISCONTINUED | OUTPATIENT
Start: 2023-02-23 | End: 2023-02-26 | Stop reason: HOSPADM

## 2023-02-23 RX ORDER — INSULIN LISPRO 100 [IU]/ML
1-6 INJECTION, SOLUTION INTRAVENOUS; SUBCUTANEOUS
Status: DISCONTINUED | OUTPATIENT
Start: 2023-02-24 | End: 2023-02-26 | Stop reason: HOSPADM

## 2023-02-23 RX ORDER — ATORVASTATIN CALCIUM 40 MG/1
40 TABLET, FILM COATED ORAL DAILY
Status: DISCONTINUED | OUTPATIENT
Start: 2023-02-24 | End: 2023-02-26 | Stop reason: HOSPADM

## 2023-02-23 RX ORDER — LEVOTHYROXINE SODIUM 0.07 MG/1
37.5 TABLET ORAL DAILY
Status: DISCONTINUED | OUTPATIENT
Start: 2023-02-24 | End: 2023-02-26 | Stop reason: HOSPADM

## 2023-02-23 RX ORDER — ESCITALOPRAM OXALATE 10 MG/1
10 TABLET ORAL DAILY
Status: DISCONTINUED | OUTPATIENT
Start: 2023-02-24 | End: 2023-02-23

## 2023-02-23 RX ADMIN — SODIUM CHLORIDE, SODIUM GLUCONATE, SODIUM ACETATE, POTASSIUM CHLORIDE, MAGNESIUM CHLORIDE, SODIUM PHOSPHATE, DIBASIC, AND POTASSIUM PHOSPHATE 125 ML/HR: .53; .5; .37; .037; .03; .012; .00082 INJECTION, SOLUTION INTRAVENOUS at 16:44

## 2023-02-23 RX ADMIN — Medication 12.5 MG: at 21:50

## 2023-02-23 RX ADMIN — OXYBUTYNIN CHLORIDE 5 MG: 5 TABLET ORAL at 21:50

## 2023-02-23 RX ADMIN — NYSTATIN: 100000 POWDER TOPICAL at 21:50

## 2023-02-23 RX ADMIN — SODIUM CHLORIDE 1000 ML: 0.9 INJECTION, SOLUTION INTRAVENOUS at 15:47

## 2023-02-23 RX ADMIN — Medication 250 MG: at 21:50

## 2023-02-23 RX ADMIN — LATANOPROST 1 DROP: 50 SOLUTION OPHTHALMIC at 21:50

## 2023-02-23 RX ADMIN — PANTOPRAZOLE SODIUM 40 MG: 40 TABLET, DELAYED RELEASE ORAL at 21:50

## 2023-02-23 RX ADMIN — ZOLPIDEM TARTRATE 10 MG: 5 TABLET ORAL at 21:50

## 2023-02-23 NOTE — PROGRESS NOTES
Daily Note     Today's date: 2023  Patient name: Demond Jackson  : 1945  MRN: [id number]  Referring provider: NANY House  Dx:   Encounter Diagnosis     ICD-10-CM    1  Gait instability  R26 81           Start Time:   Stop Time: 933  Total time in clinic (min): 38 minutes    Subjective: Yecenia Hunter reports she thinks the kidney infection may be back as her doctor is running some tests  States she hasn't felt steady on her feet for the past 3 days; denies any recent falls or near falls  Reports she would like to begin walking with a cane instead of the walker  Objective: See treatment diary below      Assessment: Tolerated treatment fair  Slow performance of exercises throughout session secondary to increased fatigue levels  Pixie Grief to inform PCP of increased fatigue levels recently  Discussed holding on ambulating with SPC at this time secondary to reports of not feeling steady on her feet with a RW as the [facility] will give less stability; Yecenia Hunter verbalized understanding  Session focused on balance this session with discussion of HEP compliance for LE strengthening  Patient demonstrated fatigue post treatment, exhibited good technique with therapeutic exercises and would benefit from continued PT  Plan: Continue per plan of care  Progress treatment as tolerated         Precautions: Fall risk, see PHM, max 4 units/day    * Indicates part of HEP   HEP code: [facility]       Daily Treatment Diary     Manuals                                     Therapeutic Exercise           Bike (strength/  endurance)   5' lvl 0  5' lvl 1 5' lvl 0 5' lvl 0 5' lvl 0  5 lvl 0 5' lvl 1   Seated ball roll out* 10x5"   10x5" 20x5" 20x 5"  10x5" 2x10x5"   LTR*    15x5" 15x5"  np 15x5"    Bridges*    3x10 3x10  np 3x12    Clamshells*           SLR flexion           Standing hip extension*           Standing hip abduction*           SLR abduction Neuro Re-ed           NBOS balance :30x2 floor EO w/ hor head turns, :30x2 floor EO w/ partha head turns  :30x1 on foam     nv w/ head turns on foam EO :30x2,  :30x2 w/ hor head turns, :30x2 w/ partha head turms on foam EO  :30x2 w/ hor head turns, :30x2 w/ partha head turns on foam EO  :30x2 w/ hor head turns, :30x2 w/ partha head turns     Tandem balance* :30x2 ea  :45x2 ea nv :45x3 ea :45x3 ea     SL balance           Tandem walking           Walking with vertical head turns    nv 2 laps 2 laps  2 laps w/ HHA    Walking with horizontal head turns    nv 2 laps 2 laps  2 laps w/ HHA    Backwards walking           Hurdles - fwd           Hurdles - lateral                      Patient education done       done              Therapeutic Activity           Sit to stands   3x10 w/ foam boost 3x10 w/ foam boost  3 x 10 w/ foam boost      Leg press           Heel raises*   3x10 3x10  3 x 10     Toe raises*   3x10 3x10  3  x10      Band resisted side stepping           Monster walks           Forward step ups           Lateral step ups                                 Gait Training           Ambulation w/ rollator nv    2 laps around clinic 2 laps around clinic 2 laps around clinic    Ambulation w/ HHA       2 laps               Modalities

## 2023-02-23 NOTE — ED PROVIDER NOTES
History  Chief Complaint   Patient presents with   • Medical Problem     Arrives from home via EMS  Has a stroke hx, family is concerned that patient has been more tired and confused over the last few days per EMS and would like her to be further evaluated  Being treated for a UTI currently  A&Ox4  Neuro exam intact  Patient presents to the emergency room with her family  For the past few days she has been confused  She has had increasing weakness  She has a history of being diagnosed with a urinary tract infection, 2 days ago, which she was started on doxycycline  She never started the medication as she felt that there was an interaction between the doxycycline and her water pill because she is having daily diarrhea  Family states that she is increasingly confused and weak  Her urine smells foul  Then denies any upper respiratory symptoms such as nasal congestion, postnasal drip, coughing  Patient denies any chest pain or shortness of breath  She denies any abdominal pain, nausea, vomiting,  or constipation  She denies any black tarry stools or bright red blood per rectum  Patient had an outpatient urinalysis performed at her physician's office  It was leukocyte negative, nitrate negative, 0-2 white cells  The urine was sent for a culture that is pending        History provided by:  Patient  Fatigue  Severity:  Moderate  Onset quality:  Gradual  Duration:  2 days  Timing:  Constant  Progression:  Worsening  Chronicity:  New  Context: recent infection    Context: not alcohol use, not allergies, not change in medication, not decreased sleep, not drug use, not increased activity, not pinched nerve, not stress and not urinary tract infection    Relieved by:  Nothing  Worsened by:  Nothing  Ineffective treatments:  None tried  Associated symptoms: diarrhea, difficulty walking, foul-smelling urine, frequency and lethargy    Associated symptoms: no abdominal pain, no anorexia, no aphasia, no arthralgias, no ataxia, no chest pain, no cough, no dizziness, no drooling, no dysphagia, no dysuria, no numbness in extremities, no falls, no fever, no headaches, no hematochezia, no loss of consciousness, no melena, no myalgias, no nausea, no near-syncope, no seizures, no sensory-motor deficit, no shortness of breath, no stroke symptoms, no syncope, no urgency, no vision change and no vomiting        Prior to Admission Medications   Prescriptions Last Dose Informant Patient Reported? Taking? Blood Glucose Monitoring Suppl (OneTouch Verio Reflect) w/Device KIT   No No   Sig: Check blood sugars twice daily  Please substitute with appropriate alternative as covered by patient's insurance  Dx: E11 65   Milnacipran HCl (Savella) 100 MG TABS   No No   Sig: Take 1 tablet (100 mg total) by mouth daily   OneTouch Delica Lancets 36U MISC   No No   Sig: Check blood sugars twice daily  Please substitute with appropriate alternative as covered by patient's insurance   Dx: E11 65   albuterol (PROVENTIL HFA,VENTOLIN HFA) 90 mcg/act inhaler   No No   Sig: Inhale 2 puffs every 6 (six) hours as needed for wheezing or shortness of breath   aspirin 81 mg chewable tablet   No No   Sig: Chew 1 tablet (81 mg total) daily   atorvastatin (LIPITOR) 40 mg tablet   No No   Sig: TAKE ONE TABLET BY MOUTH ONCE DAILY   baclofen 10 mg tablet   No No   Sig: Take one tablet by mouth three times a day as needed for muscle spasms   chlorthalidone (HYGROTEN) 15 MG tablet   No No   Sig: Take 1 5 tablets (22 5 mg total) by mouth daily   cholestyramine (QUESTRAN) 4 g packet   No No   Sig: Take 1 packet (4 g total) by mouth 3 (three) times a day with meals   doxycycline hyclate (VIBRAMYCIN) 100 mg capsule   No No   Sig: Take 1 capsule (100 mg total) by mouth every 12 (twelve) hours for 10 days   escitalopram (Lexapro) 20 mg tablet   No No   Sig: Take 0 5 tablets (10 mg total) by mouth daily   gabapentin (NEURONTIN) 300 mg capsule   No No   Sig: Take 1 capsule (300 mg total) by mouth 3 (three) times a day   glucose blood (OneTouch Verio) test strip   No No   Sig: Check blood sugars twice daily  Please substitute with appropriate alternative as covered by patient's insurance   Dx: E11 65   hydrochlorothiazide (HYDRODIURIL) 12 5 mg tablet   No No   Sig: Take 2 tablets (25 mg total) by mouth daily as needed (edema/swelling)   latanoprost (XALATAN) 0 005 % ophthalmic solution   No No   Sig: Administer 1 drop to both eyes daily at bedtime   levothyroxine 25 mcg tablet   No No   Sig: Take 1 5 tablets (37 5 mcg total) by mouth daily   lisinopril (ZESTRIL) 20 mg tablet   No No   Sig: TAKE ONE TABLET BY MOUTH ONCE DAILY   meclizine (ANTIVERT) 25 mg tablet   No No   Sig: Take 1 tablet (25 mg total) by mouth 4 (four) times a day as needed for dizziness   metFORMIN (GLUCOPHAGE) 1000 MG tablet   No No   Sig: Take 1 tablet (1,000 mg total) by mouth 2 (two) times a day with meals   metoclopramide (REGLAN) 10 mg tablet   No No   Sig: Take 1 tablet (10 mg total) by mouth 4 (four) times a day as needed (Nausea and vomiting)   metoprolol tartrate (LOPRESSOR) 25 mg tablet   No No   Sig: Take 0 5 tablets (12 5 mg total) by mouth every 12 (twelve) hours   nystatin (MYCOSTATIN) powder   No No   Sig: Apply topically 2 (two) times a day   oxyCODONE-acetaminophen (PERCOCET)  mg per tablet   No No   Sig: Take 1 tablet by mouth every 6 (six) hours as needed for severe pain Max Daily Amount: 4 tablets   pantoprazole (PROTONIX) 40 mg tablet   No No   Sig: Take 1 tablet (40 mg total) by mouth every 12 (twelve) hours   prednisoLONE (ORAPRED) 15 mg/5 mL oral solution   No No   Sig: 3 tsp twice/day for 2 days, 2 tsp twice/day for 2 days, 1 tsp twice/day x2 days than 1/2 tsp daily x 2 days   saccharomyces boulardii (FLORASTOR) 250 mg capsule   No No   Sig: Take 1 capsule (250 mg total) by mouth 2 (two) times a day   trospium chloride (SANCTURA) 20 mg tablet   No No   Sig: Take 1 tablet (20 mg total) by mouth 2 (two) times a day   zolpidem (AMBIEN) 10 mg tablet   No No   Sig: Take 1 tablet (10 mg total) by mouth daily at bedtime as needed for sleep      Facility-Administered Medications: None       Past Medical History:   Diagnosis Date   • Acid reflux    • Anemia     hx of iron-deficient   • Anxiety    • Arthritis    • Asthma     last needed inhaler last year 2020   • Basal cell carcinoma     upper lip   • Chronic narcotic dependence (HCC)    • Chronic pain    • Colon polyp    • Cystocele    • Diabetes mellitus ([address] )     stable   • Disease of thyroid gland     hypothyroidism   • Diverticulosis    • Dizziness     at times   • Dysfunctional uterine bleeding     last assessed - [id number]   • Dysphagia    • Fibromyalgia    • Gastric ulcer    • Gastroparesis    • History of colonic polyps     last assessed - [id number]   • History of gastroesophageal reflux (GERD)    • Hypercholesterolemia    • Hyperlipidemia    • Hypertension    • IBS (irritable bowel syndrome)    • Pneumobilia 06/18/2022   • Post laminectomy syndrome    • S/P insertion of spinal cord stimulator 07/18/2018   • Seasonal allergies    • Shortness of breath     exertional   • Spinal stenosis    • Status post lumbar spinal fusion 03/16/2018   • Stroke [facility])     pt states slight stroke March 2022       Past Surgical History:   Procedure Laterality Date   • APPENDECTOMY     • BACK SURGERY     • BREAST CYST EXCISION Left    • CHOLECYSTECTOMY     • COLONOSCOPY     • ESOPHAGOGASTRODUODENOSCOPY N/A 09/28/2016    Procedure: ESOPHAGOGASTRODUODENOSCOPY (EGD); Surgeon: Sarah Amado MD;  [facility];   Service:    • HERNIA REPAIR     • HYSTERECTOMY      TTAH-BSO age 25   • LAMINECTOMY     • LUMBAR LAMINECTOMY     • OOPHORECTOMY      age 25   • [place] ARTHRODESIS POSTERIOR/PSTLAT TQ 1NTRSPC THORACIC N/A 06/04/2018    Procedure: Reopening of lumbar incision for T12-L5 posterior instrumented fixation and fusion and T12-L4 posterior decompression;  Surgeon: Je Quiroga MD;  [facility];  Service: Neurosurgery   • VT COLONOSCOPY FLX DX W/COLLJ SPEC WHEN PFRMD N/A 2016    Procedure: EGD AND COLONOSCOPY;  Surgeon: Cameron Merchant MD;  Location: [facility]; Service: Gastroenterology   • VT DILATION [address] [facility] UNGUIDED SOUND/BOUGIE 1/MULT PASS N/A 2016    Procedure: DILATATION ESOPHAGEAL;  Surgeon: Cameron Merchant MD;  [facility]: [facility]; Service: Gastroenterology   • VT ESOPHAGOGASTRODUODENOSCOPY TRANSORAL DIAGNOSTIC N/A 2016    Procedure: ESOPHAGOGASTRODUODENOSCOPY (EGD); Surgeon: Cameron Merchant MD;  [facility]: [facility]; Service: Gastroenterology   • VT INSJ/RPLCMT SPI NPGR DIR/INDUXIVE COUPLING Left 2018    Procedure: removal of left buttock implantable pulse generator and placement of new  implantable pulse generator;  Surgeon: Harpal Toney MD;  [facility];  Service: Neurosurgery       Family History   Problem Relation Age of Onset   • Lung cancer Mother 55   • Pulmonary embolism Father    • No Known Problems Sister    • No Known Problems Daughter    • No Known Problems Daughter    • Stroke Maternal Grandmother    • Heart attack Maternal Grandfather    • No Known Problems Paternal Grandmother    • No Known Problems Paternal Grandfather    • No Known Problems Maternal Aunt    • Diabetes Family         Diabetes mellitus   • Hypertension Family    • Stroke Family         Stroke complications     I have reviewed and agree with the history as documented      E-Cigarette/Vaping   • E-Cigarette Use Never User      E-Cigarette/Vaping Substances   • Nicotine No    • THC No    • CBD No    • Flavoring No    • Other No    • Unknown No      Social History     Tobacco Use   • Smoking status: Former     Types: Cigarettes     Quit date: 1970     Years since quittin 1   • Smokeless tobacco: Never   • Tobacco comments:     Denied history of current ever day smoker, Former smoker and Never smoker all documented in [employer]   Vaping Use   • Vaping Use: Never used   Substance Use Topics   • Alcohol use: Not Currently     Comment: Denied history of alcohol use   • Drug use: No     Comment: Denied history of drug use       Review of Systems   Constitutional: Positive for activity change and fatigue  Negative for appetite change, chills, diaphoresis and fever  HENT: Negative for congestion, drooling, mouth sores, postnasal drip, rhinorrhea and sore throat  Eyes: Negative for pain, discharge, redness and itching  Respiratory: Negative for cough, chest tightness and shortness of breath  Cardiovascular: Negative for chest pain, syncope and near-syncope  Gastrointestinal: Positive for diarrhea  Negative for abdominal pain, anorexia, dysphagia, hematochezia, melena, nausea and vomiting  Genitourinary: Positive for frequency  Negative for dysuria, hematuria and urgency  Musculoskeletal: Negative for arthralgias, falls and myalgias  Skin: Negative for color change, pallor and rash  Neurological: Negative for dizziness, seizures, loss of consciousness and headaches  Psychiatric/Behavioral: Positive for confusion  All other systems reviewed and are negative  Physical Exam  Physical Exam  Vitals and nursing note reviewed  Constitutional:       General: She is not in acute distress  Appearance: Normal appearance  She is normal weight  She is not ill-appearing, toxic-appearing or diaphoretic  HENT:      Head: Normocephalic and atraumatic  Right Ear: Tympanic membrane and external ear normal       Left Ear: Tympanic membrane and external ear normal       Nose: No congestion or rhinorrhea  Mouth/Throat:      Mouth: Mucous membranes are dry  Pharynx: No oropharyngeal exudate or posterior oropharyngeal erythema  Eyes:      General:         Right eye: No discharge  Left eye: No discharge  Conjunctiva/sclera: Conjunctivae normal    Cardiovascular:      Rate and Rhythm: Normal rate and regular rhythm        Heart sounds: Normal heart sounds  No murmur heard  No friction rub  No gallop  Pulmonary:      Effort: Pulmonary effort is normal       Breath sounds: Normal breath sounds  Abdominal:      Tenderness: There is no abdominal tenderness  There is no guarding or rebound  Musculoskeletal:         General: Normal range of motion  Cervical back: Neck supple  No rigidity or tenderness  Lymphadenopathy:      Cervical: No cervical adenopathy  Skin:     General: Skin is warm  Capillary Refill: Capillary refill takes less than 2 seconds  Neurological:      General: No focal deficit present  Mental Status: She is alert and oriented to person, place, and time  Psychiatric:         Mood and Affect: Mood normal          Behavior: Behavior normal          Thought Content:  Thought content normal          Judgment: Judgment normal          Vital Signs  ED Triage Vitals [02/23/23 1427]   Temperature Pulse Respirations Blood Pressure SpO2   98 7 °F (37 1 °C) 83 18 111/58 93 %      Temp Source Heart Rate Source Patient Position - Orthostatic VS BP Location FiO2 (%)   Oral Monitor Sitting Right arm --      Pain Score       No Pain           Vitals:    02/23/23 2152 02/24/23 0710 02/24/23 1400 02/24/23 1528   BP: 167/84 109/71 109/71 149/87   Pulse: 91 84 84 87   Patient Position - Orthostatic VS:             Visual Acuity  Visual Acuity    Flowsheet Row Most Recent Value   L Pupil Size (mm) 3   R Pupil Size (mm) 3          ED Medications  Medications   aspirin chewable tablet 81 mg (81 mg Oral Given 2/24/23 0853)   latanoprost (XALATAN) 0 005 % ophthalmic solution 1 drop (1 drop Both Eyes Given 2/23/23 2150)   levothyroxine tablet 37 5 mcg (37 5 mcg Oral Given 2/24/23 0853)   metoprolol tartrate (LOPRESSOR) partial tablet 12 5 mg (12 5 mg Oral Not Given 2/24/23 0854)   Milnacipran HCl TABS 100 mg (100 mg Oral Not Given 2/24/23 0856)   nystatin (MYCOSTATIN) powder ( Topical Given 2/24/23 1704)   pantoprazole (PROTONIX) EC tablet 40 mg (40 mg Oral Given 2/24/23 0854)   saccharomyces boulardii (FLORASTOR) capsule 250 mg (250 mg Oral Given 2/24/23 1703)   oxybutynin (DITROPAN) tablet 5 mg (5 mg Oral Given 2/24/23 1703)   albuterol (PROVENTIL HFA,VENTOLIN HFA) inhaler 2 puff (has no administration in time range)   meclizine (ANTIVERT) tablet 25 mg (has no administration in time range)   metoclopramide (REGLAN) tablet 10 mg (has no administration in time range)   zolpidem (AMBIEN) tablet 10 mg (10 mg Oral Given 2/23/23 2150)   atorvastatin (LIPITOR) tablet 40 mg (40 mg Oral Given 2/24/23 0854)   baclofen tablet 10 mg (has no administration in time range)   acetaminophen (TYLENOL) tablet 650 mg (has no administration in time range)   gabapentin (NEURONTIN) capsule 300 mg (has no administration in time range)   oxyCODONE (ROXICODONE) immediate release tablet 10 mg (has no administration in time range)     And   acetaminophen (TYLENOL) tablet 325 mg (has no administration in time range)   heparin (porcine) subcutaneous injection 5,000 Units (5,000 Units Subcutaneous Given 2/24/23 1457)   insulin lispro (HumaLOG) 100 units/mL subcutaneous injection 1-6 Units (1 Units Subcutaneous Given 2/24/23 1704)   insulin lispro (HumaLOG) 100 units/mL subcutaneous injection 1-5 Units (1 Units Subcutaneous Not Given 2/24/23 0041)   multi-electrolyte (PLASMALYTE-A/ISOLYTE-S PH 7 4) IV solution (75 mL/hr Intravenous New Bag 2/24/23 0855)       Diagnostic Studies  Results Reviewed     Procedure Component Value Units Date/Time    HS Troponin I 4hr [[id number]]  (Normal) Collected: 02/23/23 2012    Lab Status: Final result Specimen: Blood from Arm, Right Updated: 02/23/23 2053     hs TnI 4hr 8 ng/L      Delta 4hr hsTnI -1 ng/L     Blood culture #1 [[id number]] Collected: 02/23/23 1533    Lab Status: Preliminary result Specimen: Blood from Arm, Left Updated: 02/23/23 2001     Blood Culture Received in Microbiology Lab  Culture in Progress      Blood culture #2 [[id number]] Collected: 02/23/23 1548    Lab Status: Preliminary result Specimen: Blood from Arm, Right Updated: 02/23/23 2001     Blood Culture Received in Microbiology Lab  Culture in Progress  HS Troponin I 2hr [[id number]]  (Normal) Collected: 02/23/23 1732    Lab Status: Final result Specimen: Blood from Line, Venous Updated: 02/23/23 1807     hs TnI 2hr 9 ng/L      Delta 2hr hsTnI 0 ng/L     Stool Enteric Bacterial Panel by PCR [[id number]]     Lab Status: No result Specimen: Stool     Clostridium difficile toxin by PCR with EIA [[id number]]     Lab Status: No result Specimen: Stool     Protime-INR [[id number]]  (Normal) Collected: 02/23/23 1533    Lab Status: Final result Specimen: Blood from Arm, Left Updated: 02/23/23 1653     Protime 12 6 seconds      INR 0 93    APTT [[id number]]  (Normal) Collected: 02/23/23 1533    Lab Status: Final result Specimen: Blood from Arm, Left Updated: 02/23/23 1653     PTT 30 seconds     UA w Reflex to Microscopic w Reflex to Culture [[id number]] Collected: 02/23/23 1614    Lab Status: Final result Specimen: Urine, Straight Cath Updated: 02/23/23 1635     Color, UA Light Yellow     Clarity, UA Clear     Specific Gravity, UA 1 017     pH, UA 5 0     Leukocytes, UA Negative     Nitrite, UA Negative     Protein, UA Negative mg/dl      Glucose, UA Negative mg/dl      Ketones, UA Negative mg/dl      Urobilinogen, UA <2 0 mg/dl      Bilirubin, UA Negative     Occult Blood, UA Negative    TSH [[id number]]  (Normal) Collected: 02/23/23 1533    Lab Status: Final result Specimen: Blood from Arm, Left Updated: 02/23/23 1632     TSH 3RD GENERATON 3 192 uIU/mL     Narrative:      Patients undergoing fluorescein dye angiography may retain small amounts of fluorescein in the body for 48-72 hours post procedure  Samples containing fluorescein can produce falsely depressed TSH values  If the patient had this procedure,a specimen should be resubmitted post fluorescein clearance        HS Troponin 0hr (reflex protocol) [[id number]]  (Normal) Collected: 02/23/23 1533    Lab Status: Final result Specimen: Blood from Arm, Left Updated: 02/23/23 1623     hs TnI 0hr 9 ng/L     Comprehensive metabolic panel [[id number]]  (Abnormal) Collected: 02/23/23 1533    Lab Status: Final result Specimen: Blood from Arm, Left Updated: 02/23/23 1613     Sodium 138 mmol/L      Potassium 5 0 mmol/L      Chloride 103 mmol/L      CO2 28 mmol/L      ANION GAP 7 mmol/L      BUN 40 mg/dL      Creatinine 1 68 mg/dL      Glucose 99 mg/dL      Calcium 9 7 mg/dL      AST 16 U/L      ALT 9 U/L      Alkaline Phosphatase 89 U/L      Total Protein 6 3 g/dL      Albumin 3 7 g/dL      Total Bilirubin 0 39 mg/dL      eGFR 28 ml/min/1 73sq m     Narrative:      [place] guidelines for Chronic Kidney Disease (CKD):   •  Stage 1 with normal or high GFR (GFR > 90 mL/min/1 73 square meters)  •  Stage 2 Mild CKD (GFR = 60-89 mL/min/1 73 square meters)  •  Stage 3A Moderate CKD (GFR = 45-59 mL/min/1 73 square meters)  •  Stage 3B Moderate CKD (GFR = 30-44 mL/min/1 73 square meters)  •  Stage 4 Severe CKD (GFR = 15-29 mL/min/1 73 square meters)  •  Stage 5 End Stage CKD (GFR <15 mL/min/1 73 square meters)  Note: GFR calculation is accurate only with a steady state creatinine    Magnesium [[id number]]  (Normal) Collected: 02/23/23 1533    Lab Status: Final result Specimen: Blood from Arm, Left Updated: 02/23/23 1613     Magnesium 2 3 mg/dL     Lactic acid [[id number]]  (Normal) Collected: 02/23/23 1533    Lab Status: Final result Specimen: Blood from Arm, Left Updated: 02/23/23 1612     LACTIC ACID 1 3 mmol/L     Narrative:      Result may be elevated if tourniquet was used during collection      CBC and differential [[id number]]  (Abnormal) Collected: 02/23/23 1533    Lab Status: Final result Specimen: Blood from Arm, Left Updated: 02/23/23 1556     WBC 6 83 Thousand/uL      RBC 3 54 Million/uL      Hemoglobin 10 0 g/dL Hematocrit 32 8 %      MCV 93 fL      MCH 28 2 pg      MCHC 30 5 g/dL      RDW 13 8 %      MPV 11 1 fL      Platelets 169 Thousands/uL      nRBC 0 /100 WBCs      Neutrophils Relative 61 %      Immat GRANS % 0 %      Lymphocytes Relative 23 %      Monocytes Relative 11 %      Eosinophils Relative 4 %      Basophils Relative 1 %      Neutrophils Absolute 4 20 Thousands/µL      Immature Grans Absolute 0 02 Thousand/uL      Lymphocytes Absolute 1 56 Thousands/µL      Monocytes Absolute 0 76 Thousand/µL      Eosinophils Absolute 0 25 Thousand/µL      Basophils Absolute 0 04 Thousands/µL                  VAS lower limb venous duplex study, complete bilateral   Final Result by Gail Cortes MD (02/24 1636)      XR chest 1 view portable   Final Result by Kaycee Hairston MD (02/23 1536)      No acute cardiopulmonary disease                    Workstation performed: [id number]         CT head without contrast   ED Interpretation by Anaya Macias PA-C (02/23 1635)   CT head without contrast  Status: Final result    PACS Images     Show images for CT head without contrast  Study Result    Wet Read  CT head without contrast   Status: Final result     PACS Images      Show images for CT head without contrast   Study Result     Narrative & Impression   CT BRAIN - WITHOUT CONTRAST       INDICATION:   ams        COMPARISON:  7/25/2022       TECHNIQUE:  CT examination of the brain was performed   In addition to axial images, sagittal and coronal 2D reformatted images were created and submitted for interpretation        Radiation dose length product (DLP) for this visit: 9147 0918 mGy-cm    This examination, like all CT scans performed in the [facility], was performed utilizing techniques to minimize radiation dose exposure, including the use of iterative   reconstruction and automated exposure control          IMAGE QUALITY:  Diagnostic        FINDINGS:       PARENCHYMA: Decreased attenuation is noted in periventricular and subcortical white    matter demonstrating an appearance that is statistically most likely to represent mild microangiopathic change        No CT signs of acute infarction   No   intracranial mass, mass effect or midline shift   No acute parenchymal hemorrhage        VENTRICLES AND EXTRA-AXIAL SPACES:  Normal for the patient's age        VISUALIZED ORBITS: Normal visualized orbits        PARANASAL SINUSES: Normal visualized paranasal sinuses        CALVARIUM AND EXTRACRANIAL SOFT TISSUES:  Normal        IMPRESSION:       No acute intracranial abnormality                        Workstation performed: [id number]         Imaging     CT head without contrast (Order: [id number]) - 2/23/2023     Result History     CT head without contrast (Order #[id number]) on 2/23/2023 - Order Result History Report     Order Report     Order Details   Order Questions     Question Answer   What is the patient's sedation requirement? No Sedation   Release to patient through Mychart Immediate   Exam reason ams   Note: Enter reason    for exam   Decision Support Exception Emergency Medical Condition (MA)           Result Information     Status Priority Source   Final result (2/23/2023 1555) STAT     Other Results from 2/23/20 23     UA w Reflex to Microscopic w Reflex to Culture  In process 2/23/2023   Blood culture #2  In process 2/23/2023   CBC and differential  Final result 2/23/2023   Protime-INR  In process 2/23/2023   APTT  In process 2/23/2023   Blood culture #1  In process 2/23/2023   Comprehensive metabolic panel  Final result 2/23/2023   Lactic acid  Final result 2/23/2023   HS Troponin 0hr (reflex protocol)  Final result 2/23/2023   TSH  Final result 2/23/2023   Magnesium  Final result 2/23/2023    important suggestion  Warning: Additional results from 2/23/2023 are available but are not displayed in this report         Reason for Exam     ams         CT head without contrast: Patient Communication     Add Comments  Not seen         Signed by     Signed Date/Time Phone Pager   Rosa Phelan 2/23/20 23 15:55 [phone]       Exam Information     Status Exam Begun Exam Ended Performing Tech   Final [99] 2/23/2023 14:56 2/23/2023 15:08 8 Anjali Mccarty       Screening Form Questions     No questions ha   ve been answered for this form  External Results Report       Open External Results Report     Encounter       View Encounter                   Patient Care Timeline     No data selected in time range     Pre-op Summary       Pre-op             Recovery Summary       Recovery               Routing History     None        Interpreted by Dora Gonzales PA-C on 2/23/2023  4:35 PM  Narrative & Impression  CT BRAIN - WITHOUT CONTRAST     INDICATION:   ams      COMPARISON:  7/25/2022     TECHNIQUE:  CT examination of the brain was performed  In addition to axial images, sagittal and coronal 2D reformatted images were created and submitted for interpretation      Radiation dose length product (DLP) for this visit:  972 mGy-cm   This examination, like all CT scans perform   ed in the [facility], was performed utilizing techniques to minimize radiation dose exposure, including the use of iterative   reconstruction and automated exposure control        IMAGE QUALITY:  Diagnostic      FINDINGS:     PARENCHYMA: Decreased attenuation is noted in periventricular and subcortical white matter demonstrating an appearance that is statistically most likely to represent mild microangiopathic change      No CT signs of acute infarction  No intracranial mass, mass effect or midline shift    No acute parenchymal hemorrhage      VENTRICLES AND EXTRA-AXIAL SPACES:  Normal for the patient's age      VISUALIZED ORBITS: Normal visualized orbits      PARANASAL SINUSES: Normal visualized paranasal sinuses      CALVARIUM AND EXTRACRANIAL SOFT TISSUES:  Normal      IMPRESSION:     No acute intracranial abnormality                  Workstation performed: [id number]       Imaging    CT head without contrast (Order: [id number]) - 2/23/2023    Result History    CT he   ad without contrast (Order #[id number]) on 2/23/2023 - Order Result History Report    Order Report    Order Details  Order Questions    Question Answer  What is the patient's sedation requirement? No Sedation  Release to patient through Mychart Immediate  Exam reason ams  Note: Enter reason for exam  Decision Support Exception Emergency Medical Condition (MA)         Result Information    Status Priority Source  Final result (2/23/2023 1634) STAT     Other Results from 2/23/2023     UA w Reflex to Microscopic w Reflex to Culture  In process 2/23/2023   Blood culture #2  In process 2/23/2023   CBC and differential  Final result 2/23/2023   Protime-INR  In process 2/23/2023   APTT  In process 2/23/2023   Blood culture #1  In process 2/23/2023   Comprehensive metabolic panel  Final result 2/23/2023   Lactic acid  Final result 2/23/2023   HS Troponin 0hr (reflex protocol)  Final result 2/23/2023   TSH  Final result 2/23/2023   Magnesium  Final result 2/23/2023   important suggestion  Sherry salgado: Additional results from 2/23/2023 are available but are not displayed in this report  Reason for Exam    ams        CT head without contrast: Patient Communication    Add Comments  Not seen        Signed by    Signed Date/Time  Phone Pager  Jo Ann Littlefield 2/23/2023 15:55 [phone]       Exam Information    Status Exam Begun  Exam Ended  Performing Tech  Final [99] 2/23/2023 14:56 2/23/2023 15:08 8 Anjali Mccarty      Screening Form Questions    No questions have been answered for this form        External Results Report      Open External Results Report     Encounter      View Encounter                 Patient Care Timeline    No data selected in time range    Pre-op Summary      Pre-op            Recovery Summary      Recovery             Routing History    None            Final Result by Kalie Charles MD (02/23 4023)      No acute intracranial abnormality  Workstation performed: [id number]                    Procedures  ECG 12 Lead Documentation Only    Date/Time: 2/23/2023 5:04 PM  Performed by: Remington Ponce PA-C  Authorized by: Remington Ponce PA-C     Indications / Diagnosis:  Weakness, altered mental status  ECG reviewed by me, the ED Provider: yes    Patient location:  ED  Previous ECG:     Previous ECG:  Compared to current    Similarity:  No change    Comparison to cardiac monitor: Yes    Interpretation:     Interpretation: abnormal    Rate:     ECG rate:  85    ECG rate assessment: normal    Rhythm:     Rhythm: sinus rhythm    Ectopy:     Ectopy: none    QRS:     QRS axis:  Left    QRS intervals: Wide  Conduction:     Conduction: abnormal      Abnormal conduction: complete LBBB    ST segments:     ST segments:  Normal  T waves:     T waves: normal    Comments:      No signs of acute ischemia, inverted T waves in the high lateral leads that are concerning for ischemia but present on previous EKGs  Independently interpreted by me             ED Course                                             Medical Decision Making  Patient presents to the emergency room with altered mental status and generalized weakness  Her daughter is a physician, Dr Shawn Rogers  She was concerned that she may have a urinary tract infection and was concerned about an acute kidney injury as her BUN and creatinine were elevated on outpatient tests  She has been having chronic diarrhea since that time  Patient was seen and examined  Her mucous membranes were dry  Her heart was regular rate and rhythm without murmur  Her lungs were clear to auscultation  Her abdomen was soft nondistended nontender without mass or hepatosplenomegaly  She had no peripheral edema palpated or inspected      Laboratory studies were taken and were reviewed  She did have an SADAF as compared to previous lab test   She was intravenously hydrated with Isolyte  He had no evidence of a urinary tract infection  A CAT scan of her head was negative for any acute process  Chest x-ray did not demonstrate any acute cardiopulmonary disease  Impression-    SADAF  Altered mental status  Asthenia  Dehydration    Plan-    Patient was admitted for further evaluation and management of her dehydration and acute kidney injury  SADAF (acute kidney injury) ([facility] ): self-limited or minor problem  AMS (altered mental status): acute illness or injury  Asthenia: acute illness or injury  Dehydration: acute illness or injury  Diarrhea: self-limited or minor problem  Amount and/or Complexity of Data Reviewed  Labs: ordered  Radiology: ordered  Risk  Decision regarding hospitalization            Disposition  Final diagnoses:   AMS (altered mental status)   SADAF (acute kidney injury) ([place] [address] )   Asthenia   Diarrhea   Dehydration     Time reflects when diagnosis was documented in both MDM as applicable and the Disposition within this note     Time User Action Codes Description Comment    2/23/2023  4:36 PM Fartun Avila [R41 82] AMS (altered mental status)     2/23/2023  4:36 PM Eileen Julius Add [N17 9] SADAF (acute kidney injury) ([facility] )     2/23/2023  4:36 PM Eileen Julius Add [R53 1] Asthenia     2/23/2023  6:12 PM Eileen Julius Add [R19 7] Diarrhea     2/23/2023  6:13 PM Eileen Julius Add [E86 0] Dehydration     2/23/2023  8:43 PM Ole, Alpiniano B Add [I63 9] Cerebrovascular accident (CVA), unspecified mechanism ([place] [address] )     2/23/2023  8:43 PM Ole, Alpiniano B Add [R41 0] Confusion with body shakes and blank stare       ED Disposition     ED Disposition   Admit    Condition   Stable    Date/Time   u Feb 23, 2023  6:11 PM    Comment   Case was discussed with Dr Dimitry Roberts and the patient's admission status was agreed to be Admission Status: inpatient status to the service of Dr Radha Richard   Follow-up Information    None         Current Discharge Medication List      CONTINUE these medications which have NOT CHANGED    Details   metoprolol tartrate (LOPRESSOR) 25 mg tablet Take 0 5 tablets (12 5 mg total) by mouth every 12 (twelve) hours  Qty: 45 tablet, Refills: 3    Associated Diagnoses: Primary hypertension      albuterol (PROVENTIL HFA,VENTOLIN HFA) 90 mcg/act inhaler Inhale 2 puffs every 6 (six) hours as needed for wheezing or shortness of breath  Qty: 6 7 g, Refills: 5    Comments: Substitution to a formulary equivalent within the same pharmaceutical class is authorized  Associated Diagnoses: Mild intermittent asthma without complication      aspirin 81 mg chewable tablet Chew 1 tablet (81 mg total) daily  Qty: 30 tablet, Refills: 0    Associated Diagnoses: Cerebrovascular accident (CVA), unspecified mechanism (HCC)      atorvastatin (LIPITOR) 40 mg tablet TAKE ONE TABLET BY MOUTH ONCE DAILY  Qty: 90 tablet, Refills: 0    Associated Diagnoses: High cholesterol      baclofen 10 mg tablet Take one tablet by mouth three times a day as needed for muscle spasms  Qty: 90 tablet, Refills: 4    Associated Diagnoses: Lumbar back pain      Blood Glucose Monitoring Suppl (OneTouch Verio Reflect) w/Device KIT Check blood sugars twice daily  Please substitute with appropriate alternative as covered by patient's insurance   Dx: E11 65  Qty: 1 kit, Refills: 0    Associated Diagnoses: Type 2 diabetes mellitus without complication, without long-term current use of insulin (HCC)      chlorthalidone (HYGROTEN) 15 MG tablet Take 1 5 tablets (22 5 mg total) by mouth daily  Qty: 30 tablet, Refills: 3    Associated Diagnoses: Localized edema      cholestyramine (QUESTRAN) 4 g packet Take 1 packet (4 g total) by mouth 3 (three) times a day with meals  Qty: 60 packet, Refills: 2    Associated Diagnoses: Diarrhea, unspecified type      doxycycline hyclate (VIBRAMYCIN) 100 mg capsule Take 1 capsule (100 mg total) by mouth every 12 (twelve) hours for 10 days  Qty: 20 capsule, Refills: 0    Associated Diagnoses: Urinary tract infection without hematuria, site unspecified      escitalopram (Lexapro) 20 mg tablet Take 0 5 tablets (10 mg total) by mouth daily  Qty: 30 tablet, Refills: 5    Associated Diagnoses: Reactive depression      gabapentin (NEURONTIN) 300 mg capsule Take 1 capsule (300 mg total) by mouth 3 (three) times a day  Qty: 90 capsule, Refills: 5    Associated Diagnoses: Neuropathy      glucose blood (OneTouch Verio) test strip Check blood sugars twice daily  Please substitute with appropriate alternative as covered by patient's insurance   Dx: E11 65  Qty: 200 each, Refills: 3    Associated Diagnoses: Type 2 diabetes mellitus without complication, without long-term current use of insulin ([facility])      hydrochlorothiazide (HYDRODIURIL) 12 5 mg tablet Take 2 tablets (25 mg total) by mouth daily as needed (edema/swelling)  Qty: 30 tablet, Refills: 3    Associated Diagnoses: Localized edema      latanoprost (XALATAN) 0 005 % ophthalmic solution Administer 1 drop to both eyes daily at bedtime  Qty: 2 5 mL, Refills: 3    Associated Diagnoses: Glaucoma of both eyes, unspecified glaucoma type      levothyroxine 25 mcg tablet Take 1 5 tablets (37 5 mcg total) by mouth daily  Qty: 45 tablet, Refills: 4    Associated Diagnoses: Hypothyroidism, unspecified type      lisinopril (ZESTRIL) 20 mg tablet TAKE ONE TABLET BY MOUTH ONCE DAILY  Qty: 100 tablet, Refills: 0    Associated Diagnoses: Essential hypertension      meclizine (ANTIVERT) 25 mg tablet Take 1 tablet (25 mg total) by mouth 4 (four) times a day as needed for dizziness  Qty: 60 tablet, Refills: 0    Associated Diagnoses: Vertigo      metFORMIN (GLUCOPHAGE) 1000 MG tablet Take 1 tablet (1,000 mg total) by mouth 2 (two) times a day with meals  Qty: 180 tablet, Refills: 3    Associated Diagnoses: Diabetes 1 5, managed as type 2 ([facility]) metoclopramide (REGLAN) 10 mg tablet Take 1 tablet (10 mg total) by mouth 4 (four) times a day as needed (Nausea and vomiting)  Qty: 120 tablet, Refills: 2    Comments: Change in dose  Associated Diagnoses: Gastroparesis      Milnacipran HCl (Savella) 100 MG TABS Take 1 tablet (100 mg total) by mouth daily  Qty: 30 tablet, Refills: 3    Associated Diagnoses: Fibromyalgia      nystatin (MYCOSTATIN) powder Apply topically 2 (two) times a day  Qty: 15 g, Refills: 0    Associated Diagnoses: Sepsis ([address] ); Obesity, morbid ([facility])      OneTouch Delica Lancets 81W MISC Check blood sugars twice daily  Please substitute with appropriate alternative as covered by patient's insurance  Dx: E11 65  Qty: 200 each, Refills: 3    Associated Diagnoses: Type 2 diabetes mellitus without complication, without long-term current use of insulin ([facility])      oxyCODONE-acetaminophen (PERCOCET)  mg per tablet Take 1 tablet by mouth every 6 (six) hours as needed for severe pain Max Daily Amount: 4 tablets  Qty: 120 tablet, Refills: 0    Comments: Patient going to use [employer] [facility] card  Associated Diagnoses: Post laminectomy syndrome; Lumbar back pain      pantoprazole (PROTONIX) 40 mg tablet Take 1 tablet (40 mg total) by mouth every 12 (twelve) hours  Qty: 180 tablet, Refills: 0    Comments: DX Code Needed      Associated Diagnoses: Gastroesophageal reflux disease without esophagitis      prednisoLONE (ORAPRED) 15 mg/5 mL oral solution 3 tsp twice/day for 2 days, 2 tsp twice/day for 2 days, 1 tsp twice/day x2 days than 1/2 tsp daily x 2 days  Qty: 115 mL, Refills: 0    Associated Diagnoses: Bilateral sciatica      saccharomyces boulardii (FLORASTOR) 250 mg capsule Take 1 capsule (250 mg total) by mouth 2 (two) times a day  Qty: 90 capsule, Refills: 0    Associated Diagnoses: Diarrhea, unspecified type      trospium chloride (SANCTURA) 20 mg tablet Take 1 tablet (20 mg total) by mouth 2 (two) times a day  Qty: 90 tablet, Refills: 3    Associated Diagnoses: Urinary incontinence, unspecified type      zolpidem (AMBIEN) 10 mg tablet Take 1 tablet (10 mg total) by mouth daily at bedtime as needed for sleep  Qty: 30 tablet, Refills: 3    Comments: Pt using good rx  Associated Diagnoses: Primary insomnia             No discharge procedures on file      PDMP Review       Value Time User    PDMP Reviewed  Yes 2/23/2023  8:42 PM Isamar Handy MD          ED Provider  Electronically Signed by           Noemí Fritz PA-C  02/24/23 [id number]

## 2023-02-24 ENCOUNTER — APPOINTMENT (INPATIENT)
Dept: VASCULAR ULTRASOUND | Facility: HOSPITAL | Age: 78
End: 2023-02-24

## 2023-02-24 ENCOUNTER — APPOINTMENT (INPATIENT)
Dept: NON INVASIVE DIAGNOSTICS | Facility: HOSPITAL | Age: 78
End: 2023-02-24

## 2023-02-24 LAB
ANION GAP SERPL CALCULATED.3IONS-SCNC: 5 MMOL/L (ref 4–13)
ANION GAP SERPL CALCULATED.3IONS-SCNC: 6 MMOL/L (ref 4–13)
AORTIC ROOT: 2.6 CM
APICAL FOUR CHAMBER EJECTION FRACTION: 56 %
ASCENDING AORTA: 3.3 CM
AV LVOT MEAN GRADIENT: 5 MMHG
AV LVOT PEAK GRADIENT: 8 MMHG
BUN SERPL-MCNC: 31 MG/DL (ref 5–25)
BUN SERPL-MCNC: 35 MG/DL (ref 5–25)
CALCIUM SERPL-MCNC: 9 MG/DL (ref 8.4–10.2)
CALCIUM SERPL-MCNC: 9.2 MG/DL (ref 8.4–10.2)
CHLORIDE SERPL-SCNC: 104 MMOL/L (ref 96–108)
CHLORIDE SERPL-SCNC: 104 MMOL/L (ref 96–108)
CO2 SERPL-SCNC: 27 MMOL/L (ref 21–32)
CO2 SERPL-SCNC: 29 MMOL/L (ref 21–32)
CREAT SERPL-MCNC: 1.15 MG/DL (ref 0.6–1.3)
CREAT SERPL-MCNC: 1.28 MG/DL (ref 0.6–1.3)
DOP CALC LVOT PEAK VEL VTI: 29.87 CM
DOP CALC LVOT PEAK VEL: 1.41 M/S
E WAVE DECELERATION TIME: 218 MS
ERYTHROCYTE [DISTWIDTH] IN BLOOD BY AUTOMATED COUNT: 13.6 % (ref 11.6–15.1)
FRACTIONAL SHORTENING: 33 % (ref 28–44)
GFR SERPL CREATININE-BSD FRML MDRD: 39 ML/MIN/1.73SQ M
GFR SERPL CREATININE-BSD FRML MDRD: 45 ML/MIN/1.73SQ M
GLUCOSE SERPL-MCNC: 103 MG/DL (ref 65–140)
GLUCOSE SERPL-MCNC: 117 MG/DL (ref 65–140)
GLUCOSE SERPL-MCNC: 119 MG/DL (ref 65–140)
GLUCOSE SERPL-MCNC: 119 MG/DL (ref 65–140)
GLUCOSE SERPL-MCNC: 133 MG/DL (ref 65–140)
GLUCOSE SERPL-MCNC: 166 MG/DL (ref 65–140)
HCT VFR BLD AUTO: 30.7 % (ref 34.8–46.1)
HGB BLD-MCNC: 9.6 G/DL (ref 11.5–15.4)
INTERVENTRICULAR SEPTUM IN DIASTOLE (PARASTERNAL SHORT AXIS VIEW): 1.2 CM
INTERVENTRICULAR SEPTUM: 1.2 CM (ref 0.6–1.1)
LAAS-AP2: 14.8 CM2
LAAS-AP4: 19.9 CM2
LEFT ATRIUM AREA SYSTOLE SINGLE PLANE A4C: 19.4 CM2
LEFT ATRIUM SIZE: 3.9 CM
LEFT INTERNAL DIMENSION IN SYSTOLE: 2.6 CM (ref 2.1–4)
LEFT VENTRICULAR INTERNAL DIMENSION IN DIASTOLE: 3.9 CM (ref 3.5–6)
LEFT VENTRICULAR POSTERIOR WALL IN END DIASTOLE: 1.2 CM
LEFT VENTRICULAR STROKE VOLUME: 39 ML
LVSV (TEICH): 39 ML
MCH RBC QN AUTO: 28.7 PG (ref 26.8–34.3)
MCHC RBC AUTO-ENTMCNC: 31.3 G/DL (ref 31.4–37.4)
MCV RBC AUTO: 92 FL (ref 82–98)
MV E'TISSUE VEL-SEP: 8 CM/S
MV PEAK A VEL: 1.37 M/S
MV PEAK E VEL: 87 CM/S
MV STENOSIS PRESSURE HALF TIME: 63 MS
MV VALVE AREA P 1/2 METHOD: 3.49 CM2
PLATELET # BLD AUTO: 208 THOUSANDS/UL (ref 149–390)
PLATELET # BLD AUTO: 213 THOUSANDS/UL (ref 149–390)
PMV BLD AUTO: 11.4 FL (ref 8.9–12.7)
PMV BLD AUTO: 11.9 FL (ref 8.9–12.7)
POTASSIUM SERPL-SCNC: 5.3 MMOL/L (ref 3.5–5.3)
POTASSIUM SERPL-SCNC: 5.5 MMOL/L (ref 3.5–5.3)
RBC # BLD AUTO: 3.34 MILLION/UL (ref 3.81–5.12)
RIGHT ATRIUM AREA SYSTOLE A4C: 12.6 CM2
RIGHT VENTRICLE ID DIMENSION: 3.1 CM
SL CV LEFT ATRIUM LENGTH A2C: 4.9 CM
SL CV LV EF: 65
SL CV PED ECHO LEFT VENTRICLE DIASTOLIC VOLUME (MOD BIPLANE) 2D: 64 ML
SL CV PED ECHO LEFT VENTRICLE SYSTOLIC VOLUME (MOD BIPLANE) 2D: 25 ML
SODIUM SERPL-SCNC: 137 MMOL/L (ref 135–147)
SODIUM SERPL-SCNC: 138 MMOL/L (ref 135–147)
TR MAX PG: 27 MMHG
TR PEAK VELOCITY: 2.6 M/S
TRICUSPID VALVE PEAK REGURGITATION VELOCITY: 2.6 M/S
WBC # BLD AUTO: 6.14 THOUSAND/UL (ref 4.31–10.16)

## 2023-02-24 RX ORDER — ECHINACEA PURPUREA EXTRACT 125 MG
1 TABLET ORAL
Status: DISCONTINUED | OUTPATIENT
Start: 2023-02-24 | End: 2023-02-26 | Stop reason: HOSPADM

## 2023-02-24 RX ORDER — BENZONATATE 100 MG/1
100 CAPSULE ORAL 3 TIMES DAILY PRN
Status: DISCONTINUED | OUTPATIENT
Start: 2023-02-24 | End: 2023-02-26 | Stop reason: HOSPADM

## 2023-02-24 RX ORDER — SODIUM CHLORIDE, SODIUM GLUCONATE, SODIUM ACETATE, POTASSIUM CHLORIDE, MAGNESIUM CHLORIDE, SODIUM PHOSPHATE, DIBASIC, AND POTASSIUM PHOSPHATE .53; .5; .37; .037; .03; .012; .00082 G/100ML; G/100ML; G/100ML; G/100ML; G/100ML; G/100ML; G/100ML
75 INJECTION, SOLUTION INTRAVENOUS ONCE
Status: COMPLETED | OUTPATIENT
Start: 2023-02-24 | End: 2023-02-24

## 2023-02-24 RX ADMIN — Medication 250 MG: at 17:03

## 2023-02-24 RX ADMIN — OXYBUTYNIN CHLORIDE 5 MG: 5 TABLET ORAL at 08:54

## 2023-02-24 RX ADMIN — OXYBUTYNIN CHLORIDE 5 MG: 5 TABLET ORAL at 17:03

## 2023-02-24 RX ADMIN — Medication 250 MG: at 08:54

## 2023-02-24 RX ADMIN — HEPARIN SODIUM 5000 UNITS: 5000 INJECTION INTRAVENOUS; SUBCUTANEOUS at 21:18

## 2023-02-24 RX ADMIN — Medication 12.5 MG: at 21:17

## 2023-02-24 RX ADMIN — HEPARIN SODIUM 5000 UNITS: 5000 INJECTION INTRAVENOUS; SUBCUTANEOUS at 14:57

## 2023-02-24 RX ADMIN — INSULIN LISPRO 1 UNITS: 100 INJECTION, SOLUTION INTRAVENOUS; SUBCUTANEOUS at 17:04

## 2023-02-24 RX ADMIN — SODIUM CHLORIDE, SODIUM GLUCONATE, SODIUM ACETATE, POTASSIUM CHLORIDE, MAGNESIUM CHLORIDE, SODIUM PHOSPHATE, DIBASIC, AND POTASSIUM PHOSPHATE 75 ML/HR: .53; .5; .37; .037; .03; .012; .00082 INJECTION, SOLUTION INTRAVENOUS at 08:55

## 2023-02-24 RX ADMIN — ZOLPIDEM TARTRATE 10 MG: 5 TABLET ORAL at 21:22

## 2023-02-24 RX ADMIN — NYSTATIN: 100000 POWDER TOPICAL at 08:56

## 2023-02-24 RX ADMIN — LATANOPROST 1 DROP: 50 SOLUTION OPHTHALMIC at 21:18

## 2023-02-24 RX ADMIN — ASPIRIN 81 MG CHEWABLE TABLET 81 MG: 81 TABLET CHEWABLE at 08:53

## 2023-02-24 RX ADMIN — ATORVASTATIN CALCIUM 40 MG: 40 TABLET, FILM COATED ORAL at 08:54

## 2023-02-24 RX ADMIN — PANTOPRAZOLE SODIUM 40 MG: 40 TABLET, DELAYED RELEASE ORAL at 21:14

## 2023-02-24 RX ADMIN — LEVOTHYROXINE SODIUM 37.5 MCG: 75 TABLET ORAL at 08:53

## 2023-02-24 RX ADMIN — HEPARIN SODIUM 5000 UNITS: 5000 INJECTION INTRAVENOUS; SUBCUTANEOUS at 06:19

## 2023-02-24 RX ADMIN — NYSTATIN: 100000 POWDER TOPICAL at 17:04

## 2023-02-24 RX ADMIN — PANTOPRAZOLE SODIUM 40 MG: 40 TABLET, DELAYED RELEASE ORAL at 08:54

## 2023-02-24 NOTE — ASSESSMENT & PLAN NOTE
· Patient had anemia since July 2022  · Presently normocytic with normal RDW  · Stable as compared to previous levels since July 2022  · According to the patient, at times she would have occasional hemorrhoidal bleeding  But no active bleeding at this point  · Monitor  · For further evaluation and management if this worsens significantly

## 2023-02-24 NOTE — ASSESSMENT & PLAN NOTE
· Based on patient's EGFR, patient likely has chronic kidney disease stage IIIa  · Patient's baseline creatinine is 0 99  Presently 1 6  · Likely prerenal with patient having diarrhea for several days now  Patient is clinically dry on examination  · Continue IV fluids that was started in the emergency room  · Monitor BMP  · Avoid nephrotoxins  Avoid hypotension  · Hold off lisinopril in the meantime  · Hold off chlorthalidone that was just prescribed recently  According to the patient's daughter, patient is not on hydrochlorothiazide anymore  · Monitor input and output  · For further evaluation and management if worsens or no improvement

## 2023-02-24 NOTE — ASSESSMENT & PLAN NOTE
· Patient has been having diarrhea since last Sunday  Presently stopped as patient took a dose of Questran today  With history of IBS  · Patient had recent course of antibiotic treatment for UTI approximately 3 weeks ago  · Stools for bacterial enteric PCR as well as C  difficile PCR/EIA  · Contact precautions with hand hygiene in the meantime  · Hold off on Questran for now  · IV fluids  · Monitor stool output

## 2023-02-24 NOTE — ASSESSMENT & PLAN NOTE
· Patient had been having diarrhea since last Sunday  She took a dose of Questran which she has at home for irritable bowel syndrome and has had no additional events  · Patient had recent course of antibiotic treatment for UTI approximately 3 weeks ago      · Stools for bacterial enteric PCR as well as C  difficile PCR/EIA were requested but not yet sent due to lack of diarrhea

## 2023-02-24 NOTE — ASSESSMENT & PLAN NOTE
Lab Results   Component Value Date    HGBA1C 7 7 (H) 02/20/2023       No results for input(s): POCGLU in the last 72 hours  Blood Sugar Average: Last 72 hrs:    · Hold off patient's metformin while in the hospital   · Insulin sliding scale  · Accu-Cheks  · Hypoglycemia protocol  · Adjust treatment accordingly

## 2023-02-24 NOTE — OCCUPATIONAL THERAPY NOTE
Occupational Therapy Evaluation     Patient Name: Angie Vu  [id number] Date: 2/24/2023  Problem List  Principal Problem:    SADAF (acute kidney injury) ([facility] [address] )  Active Problems:    Type 2 diabetes mellitus (HCC)    Diarrhea    Confusion with body shakes and blank stare    Normocytic anemia    Bilateral lower extremity edema    Past Medical History  Past Medical History:   Diagnosis Date    Acid reflux     Anemia     hx of iron-deficient    Anxiety     Arthritis     Asthma     last needed inhaler last year 2020    Basal cell carcinoma     upper lip    Chronic narcotic dependence (HCC)     Chronic pain     Colon polyp     Cystocele     Diabetes mellitus ([facility] [address] )     stable    Disease of thyroid gland     hypothyroidism    Diverticulosis     Dizziness     at times    Dysfunctional uterine bleeding     last assessed - [id number]    Dysphagia     Fibromyalgia     Gastric ulcer     Gastroparesis     History of colonic polyps     last assessed - [id number]    History of gastroesophageal reflux (GERD)     Hypercholesterolemia     Hyperlipidemia     Hypertension     IBS (irritable bowel syndrome)     Pneumobilia 06/18/2022    Post laminectomy syndrome     S/P insertion of spinal cord stimulator 07/18/2018    Seasonal allergies     Shortness of breath     exertional    Spinal stenosis     Status post lumbar spinal fusion 03/16/2018    Stroke [facility])     pt states slight stroke March 2022     Past Surgical History  Past Surgical History:   Procedure Laterality Date    APPENDECTOMY      BACK SURGERY      BREAST CYST EXCISION Left     CHOLECYSTECTOMY      COLONOSCOPY      ESOPHAGOGASTRODUODENOSCOPY N/A 09/28/2016    Procedure: ESOPHAGOGASTRODUODENOSCOPY (EGD); Surgeon: Nikki Laughlin MD;  [facility]: [facility];   Service:     HERNIA REPAIR      HYSTERECTOMY      TTAH-BSO age 25    LAMINECTOMY      LUMBAR LAMINECTOMY      OOPHORECTOMY      age 25    CO ARTHRODESIS POSTERIOR/PSTLAT TQ 1NTRSPC THORACIC N/A 06/04/2018    Procedure: Reopening of lumbar incision for T12-L5 posterior instrumented fixation and fusion and T12-L4 posterior decompression;  Surgeon: Debbe Curling, MD;  Location: [facility];  Service: Neurosurgery    CO COLONOSCOPY FLX DX W/COLLJ SPEC WHEN PFRMD N/A 03/02/2016    Procedure: EGD AND COLONOSCOPY;  Surgeon: Roya Elizabeth MD;  Location: [facility]; Service: Gastroenterology    CO DILATION [facility] UNGUIDED SOUND/BOUGIE 1/MULT PASS N/A 09/28/2016    Procedure: DILATATION ESOPHAGEAL;  Surgeon: Roya Elizabeth MD;  [facility]: [facility]; Service: Gastroenterology    CO ESOPHAGOGASTRODUODENOSCOPY TRANSORAL DIAGNOSTIC N/A 07/18/2016    Procedure: ESOPHAGOGASTRODUODENOSCOPY (EGD); Surgeon: Roya Elizabeth MD;  [facility]: [facility]; Service: Gastroenterology    CO INSJ/RPLCMT SPI NPGR DIR/INDUXIVE COUPLING Left 06/04/2018    Procedure: removal of left buttock implantable pulse generator and placement of new  implantable pulse generator;  Surgeon: Debbe Curling, MD;  Location: [facility];  Service: Neurosurgery           02/24/23 1145   OT Last Visit   OT Visit Date 02/24/23   Note Type   Note type Evaluation   Additional Comments cognitive evaluation: 11:45am to 12:15pm evaluation: 2:18pm to 2:45pm   Pain Assessment   Pain Assessment Tool 0-10   Pain Score No Pain   Restrictions/Precautions   Other Precautions Cognitive; Chair Alarm; Bed Alarm; Fall Risk;Contact/isolation   Home Living   Type of [address] Two level;Performs ADLs on one level; Able to live on main level with bedroom/bathroom;Stairs to enter with rails  (pt lives on first floor with son  2-3 ELLYN)   Bathroom Shower/Tub Tub/shower unit   Bathroom Toilet Standard   Bathroom Equipment Grab bars in shower   Bathroom Accessibility Accessible via walker   Home Equipment Walker;Cane   Additional Comments pt reports use of RW for functional mobility   Prior Function   Level of [place] Independent with ADLs; Independent with functional mobility   Lives With Son Receives Help From Family;Friend(s)   IADLs Family/Friend/Other provides transportation; Independent with meal prep; Independent with medication management   Falls in the last 6 months 1 to 4   Vocational Retired   Comments pt lives with disabled son and is primary caregiver   Lifestyle   Autonomy PTA, pt living with son in [facility], (I) with ADLs, (I) with IADLs, (-) driving, (+) falls, and use of RW for functional mobility  Reciprocal Relationships supportive friends and family   Service to Others retired   Intrinsic Gratification likes to plant flowers and read books   Subjective   Subjective "I'm so tired"   ADL   Eating Assistance 5  Supervision/Setup   Grooming Assistance 5  Supervision/Setup   Grooming Deficit Brushing hair;Setup  (pt combed hair and put on deodorant and perfume in chair)   UB Bathing Assistance 5  Supervision/Setup   LB Bathing Assistance 2  Maximal Assistance    [address]  Supervision/Setup   LB Dressing Assistance 2  Maximal Assistance   LB Dressing Deficit Thread RLE into underwear; Thread LLE into underwear; Thread RLE into pants; Thread LLE into pants;Pull up over hips; Increased time to complete;Supervision/safety;Verbal cueing  (pt donned underwear and pants at EOB)   Toileting Assistance  2  Maximal Assistance   Additional Comments While unable to assess eating, UB bathing, LB bathing, UB dressing and toileting at time of evaluation, with use of clinical reasoning, pt's performance throughout evaluation indicates that pt may be able to perform these tasks at the levels listed above  Bed Mobility   Supine to Sit 5  Supervision   Additional items Assist x 1;HOB elevated; Bedrails; Increased time required;Verbal cues   Additional Comments pt sat at EOB for 3-5 minutes to don underwear and pants   Transfers   Sit to Stand 4  Minimal assistance   Additional items Assist x 1; Increased time required;Verbal cues  (verbal cues for hand placement and walker position)   Stand to Sit 4  Minimal assistance   Additional items Assist x 1; Increased time required;Verbal cues  (verbal cues for hand placement and walker position)   Additional Comments use of RW   Functional Mobility   Functional Mobility 4  Minimal assistance   Additional Comments Ax1, verbal cues for hand placement, upright posture, and walker position  EOB>chair   Additional items Rolling walker   Balance   Static Sitting Fair   Dynamic Sitting Fair -   Static Standing Fair -   Dynamic Standing Poor +   Ambulatory Poor +   Activity Tolerance   Activity Tolerance Patient limited by fatigue   Medical Staff Made Aware RN [facility] [place]   RUE Assessment   RUE Assessment WFL   LUE Assessment   LUE Assessment WFL   Hand Function   Gross Motor Coordination Functional   Fine Motor Coordination Functional   Cognition   Overall Cognitive Status Impaired   Arousal/Participation Alert; Cooperative   Attention Attends with cues to redirect   Orientation Level Oriented X4   Memory Decreased short term memory   Following Commands Follows one step commands inconsistently   Comments pt identified via name and   pt pleasant and cooperative to participate in eval session  pt required increased response time when responding to questions  Cognition Assessment Tools (S)  ACLS   Score (S)  3 4   Assessment   Limitation Decreased cognition;Decreased ADL status; Decreased Safe judgement during ADL;Decreased self-care trans;Decreased high-level ADLs; Decreased endurance   Prognosis Good   Assessment Pt is a 76 y o  female seen for OT evaluation s/p admission to [address] [place] [address] on 2023 due to confusion with shaking and blank stare  Pt diagnosed with SADAF (acute kidney injury) ([facility] [address] )  Pt has a significant PMH impacting occupational performance including: Type II DM, hypothyroidism, spinal stenosis, anemia, HTN, CVA (2022)  Pt with no recent admissions in the last 2 months   PTA, pt living with son in [facility] home, (I) with ADLs, (I) with IADLs, (-) driving, (+) falls, and use of RW for functional mobility  Pt is motivated to return home  Personal and environmental factors supporting pt at time of IE include age and social support  Personal and environmental factors inhibiting engagement in occupations include 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, inaccessible bathroom environment, difficulty completing ADLs and difficulty completing IADLs  During evaluation pt performed as is outlined above in flowsheet  Pt required VC for safety, VC for attention to task and hand placement  Standardized assessments used to assist in identifying performance deficits include AMPAC 6-Clicks and Barthel ADL Index  Performance deficits that affect the pt’s occupational performance during the initial evaluation include impaired balance, functional mobility, endurance, activity tolerance, functional standing tolerance and overall strength, attention to task, direction following, sequencing, problem solving and learning/remembering new tasks  Based on pt’s functional performance and deficits the following occupations will be addressed in OT treatments in order to maximize pt’s independence and overall occupational performance: grooming, bathing/showering, toileting and toilet hygiene, dressing and functional mobility  Goals are listed below  Upon discharge from acute care setting recommend d/c to [address] [place] [address]   Patient Goals to get out of bed   LTG Time Frame 10-14   Long Term Goal see goals listed below   Plan   Treatment Interventions Cognitive reorientation; ADL retraining;Functional transfer training; Endurance training;Patient/family training;Equipment evaluation/education; Compensatory technique education;Continued evaluation; Energy conservation; Activityengagement   Goal Expiration Date 03/10/23   OT Treatment Day 1   OT Frequency 3-5x/wk   Recommendation   OT Discharge Recommendation Post acute rehabilitation services   AM-PAC Daily Activity Inpatient   Lower Body Dressing 2   Bathing 2   Toileting 2   Upper Body Dressing 3   Grooming 3   Eating 3   Daily Activity Raw Score 15   Daily Activity Standardized Score (Calc for Raw Score >=11) 34 69   AM-PAC Applied Cognition Inpatient   Following a Speech/Presentation 3   Understanding Ordinary Conversation 4   Taking Medications 2   Remembering Where Things Are Placed or Put Away 2   Remembering List of 4-5 Errands 2   Taking Care of Complicated Tasks 1   Applied Cognition Raw Score 14   Applied Cognition Standardized Score 32 02   Master Cognitive Level   Master Cognitive Level Score (S)  3 4   Master Cognitive Score Recommendation (S)  24 hour assistance   Barthel Index   Feeding 5   Bathing 0   Grooming Score 0   Dressing Score 5   Bladder Score 5   Bowels Score 10   Toilet Use Score 5   Transfers (Bed/Chair) Score 10   Mobility (Level Surface) Score 0   Stairs Score 0   Barthel Index Score 40   Additional Treatment Session   Treatment Assessment Pt cooperative and willing to participate in OT session on cognition  Pt participated in the ACLS cognitive assessment  Pt able to complete 3 running stitches successfully  Pt unable to recognize errors and complete 3 whipstitches after 2 visual demonstrations  Pt unable to recognize and correct the twisted lace error  Pt scored a 3 4  Recommend 24/7 assistance for basic self care  Performance deficits affecting pt are fatigue, visual impairment, decreased short term memory, direction following, learning/remembering new tasks, attention to task, sequencing, problem-solving, and acute medical status  Pt would benefit from cognitive re-evaluation once medical status improves  End of Consult   Patient Position at End of Consult Bed/Chair alarm activated; All needs within reach; Bedside chair     Pt will perform functional transfers from supine to EOB with mod (I) to improve participation in morning routine       Pt will complete functional mobility from bed><toliet with (S) to participate in daily routines  Pt will perform functional transfers to/from all surfaces with (S) to improve participation in daily routines  Pt will perform UB dressing with mod (I) to improve participation in morning routines  Pt will perform UB bathing with mod (I) to improve participation in ADLs  Pt will perform LB dressing with mod (I) and use of long-handled equipment as needed to improve participation in ADLs  Pt will perform LB bathing with mod (I) and use of long-handled equipment as needed to improve participation in morning routines  Pt will perform tolieting tasks with min (A) to improve independence in ADL tasks  Pt will stand at sink for 2 minutes to improve standing tolerance during ADL tasks  Pt will complete an ADL task with no more than 5 verbal/visual cues to increase independence in daily routines  Pt will participate in on-going cognitive evaluation to ensure safe discharge planning  The patient's raw score on the AM-PAC Daily Activity Inpatient Short Form is 15  A raw score of less than 19 suggests the patient may benefit from discharge to post-acute rehabilitation services  Please refer to the recommendation of the Occupational Therapist for safe discharge planning      Dany Dick, OTS

## 2023-02-24 NOTE — PLAN OF CARE
Problem: OCCUPATIONAL THERAPY ADULT  Goal: Performs self-care activities at highest level of function for planned discharge setting  See evaluation for individualized goals  Description: Treatment Interventions: Cognitive reorientation, ADL retraining, Functional transfer training, Endurance training, Patient/family training, Equipment evaluation/education, Compensatory technique education, Continued evaluation, Energy conservation, Activityengagement          See flowsheet documentation for full assessment, interventions and recommendations  Note: Limitation: Decreased cognition, Decreased ADL status, Decreased Safe judgement during ADL, Decreased self-care trans, Decreased high-level ADLs, Decreased endurance  Prognosis: Good  Assessment: Pt is a 76 y o  female seen for OT evaluation s/p admission to [address] [place] [address] on 2/23/2023 due to confusion with shaking and blank stare  Pt diagnosed with SADAF (acute kidney injury) ([facility] [address] )  Pt has a significant PMH impacting occupational performance including: Type II DM, hypothyroidism, spinal stenosis, anemia, HTN, CVA (8/9/2022)  Pt with no recent admissions in the last 2 months  PTA, pt living with son in [facility], (I) with ADLs, (I) with IADLs, (-) driving, (+) falls, and use of RW for functional mobility  Pt is motivated to return home  Personal and environmental factors supporting pt at time of IE include age and social support  Personal and environmental factors inhibiting engagement in occupations include 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, inaccessible bathroom environment, difficulty completing ADLs and difficulty completing IADLs  During evaluation pt performed as is outlined above in flowsheet  Pt required VC for safety, VC for attention to task and hand placement  Standardized assessments used to assist in identifying performance deficits include AMPAC 6-Clicks and Barthel ADL Index   Performance deficits that affect the pt’s occupational performance during the initial evaluation include impaired balance, functional mobility, endurance, activity tolerance, functional standing tolerance and overall strength, attention to task, direction following, sequencing, problem solving and learning/remembering new tasks  Based on pt’s functional performance and deficits the following occupations will be addressed in OT treatments in order to maximize pt’s independence and overall occupational performance: grooming, bathing/showering, toileting and toilet hygiene, dressing and functional mobility  Goals are listed below  Upon discharge from acute care setting recommend d/c to [address] [place] [address]       OT Discharge Recommendation: Post acute rehabilitation services

## 2023-02-24 NOTE — ASSESSMENT & PLAN NOTE
· According to the patient's daughter, who is one of our [address] primary care physicians, patient had been having occasional confusion, body shakes and blank stare  · Neurology consult noted  They felt this is more likely encephalopathy rather than seizure  Unable to do MRI due to spinal stimulator  · Geriatrics consulted due to polypharmacy    Need to attempt to wean down/limit mind altering medications as much as feasible

## 2023-02-24 NOTE — ASSESSMENT & PLAN NOTE
· Patient has history of chronic bilateral lower extremity edema, but had been worsening lately  According to the patient's daughter, who is a doctor, no history of congestive heart failure    · Lower extremity Doppler negative for DVT  · Get updated echocardiogram

## 2023-02-24 NOTE — PLAN OF CARE
Problem: MOBILITY - ADULT  Goal: Maintain or return to baseline ADL function  Description: INTERVENTIONS:  -  Assess patient's ability to carry out ADLs; assess patient's baseline for ADL function and identify physical deficits which impact ability to perform ADLs (bathing, care of mouth/teeth, toileting, grooming, dressing, etc )  - Assess/evaluate cause of self-care deficits   - Assess range of motion  - Assess patient's mobility; develop plan if impaired  - Assess patient's need for assistive devices and provide as appropriate  - Encourage maximum independence but intervene and supervise when necessary  - Involve family in performance of ADLs  - Assess for home care needs following discharge   - Consider OT consult to assist with ADL evaluation and planning for discharge  - Provide patient education as appropriate  Outcome: Progressing    Problem: Prexisting or High Potential for Compromised Skin Integrity  Goal: Skin integrity is maintained or improved  Description: INTERVENTIONS:  - Identify patients at risk for skin breakdown  - Assess and monitor skin integrity  - Assess and monitor nutrition and hydration status  - Monitor labs   - Assess for incontinence   - Turn and reposition patient  - Assist with mobility/ambulation  - Relieve pressure over bony prominences  - Avoid friction and shearing  - Provide appropriate hygiene as needed including keeping skin clean and dry  - Evaluate need for skin moisturizer/barrier cream  - Collaborate with interdisciplinary team   - Patient/family teaching  - Consider wound care consult   Outcome: Progressing

## 2023-02-24 NOTE — PLAN OF CARE
Problem: MOBILITY - ADULT  Goal: Maintain or return to baseline ADL function  Description: INTERVENTIONS:  -  Assess patient's ability to carry out ADLs; assess patient's baseline for ADL function and identify physical deficits which impact ability to perform ADLs (bathing, care of mouth/teeth, toileting, grooming, dressing, etc )  - Assess/evaluate cause of self-care deficits   - Assess range of motion  - Assess patient's mobility; develop plan if impaired  - Assess patient's need for assistive devices and provide as appropriate  - Encourage maximum independence but intervene and supervise when necessary  - Involve family in performance of ADLs  - Assess for home care needs following discharge   - Consider OT consult to assist with ADL evaluation and planning for discharge  - Provide patient education as appropriate  Outcome: Progressing  Goal: Maintains/Returns to pre admission functional level  Description: INTERVENTIONS:  - Perform BMAT or MOVE assessment daily    - Set and communicate daily mobility goal to care team and patient/family/caregiver     - Collaborate with rehabilitation services on mobility goals if consulted  - Perform Range of Motion   - Reposition patient   - Dangle patient   - Stand patient   - Ambulate patient   - Out of bed to chair   - Out of bed for meals   - Out of bed for toileting  - Record patient progress and toleration of activity level   Outcome: Progressing     Problem: Prexisting or High Potential for Compromised Skin Integrity  Goal: Skin integrity is maintained or improved  Description: INTERVENTIONS:  - Identify patients at risk for skin breakdown  - Assess and monitor skin integrity  - Assess and monitor nutrition and hydration status  - Monitor labs   - Assess for incontinence   - Turn and reposition patient  - Assist with mobility/ambulation  - Relieve pressure over bony prominences  - Avoid friction and shearing  - Provide appropriate hygiene as needed including keeping skin clean and dry  - Evaluate need for skin moisturizer/barrier cream  - Collaborate with interdisciplinary team   - Patient/family teaching  - Consider wound care consult   Outcome: Progressing

## 2023-02-24 NOTE — PROGRESS NOTES
[facility]  Progress Note - Angie Vu 1945, 76 y o  female MRN: [id number]  Unit/Bed#: W -01 Encounter: [id number]  Primary Care Provider: NANY Su   Date and time admitted to hospital: 2/23/2023  2:08 PM    * SADAF (acute kidney injury) [facility])  Assessment & Plan  · Based on patient's EGFR, patient likely has chronic kidney disease stage IIIa  · Patient's baseline creatinine is 0 99  Presently 1 6  · Likely prerenal with patient having diarrhea for several days now  Patient is clinically dry on examination  · Continue IV fluids that was started in the emergency room  · Monitor BMP  · Avoid nephrotoxins  Avoid hypotension  · Hold off lisinopril in the meantime  · Hold off chlorthalidone that was just prescribed recently  According to the patient's daughter, patient is not on hydrochlorothiazide anymore  · Monitor input and output  · For further evaluation and management if worsens or no improvement  Diarrhea  Assessment & Plan  · Patient has been having diarrhea since last Sunday  Presently stopped as patient took a dose of Questran today  With history of IBS  · Patient had recent course of antibiotic treatment for UTI approximately 3 weeks ago  · Stools for bacterial enteric PCR as well as C  difficile PCR/EIA  · Contact precautions with hand hygiene in the meantime  · Hold off on Questran for now  · IV fluids  · Monitor stool output  Confusion with body shakes and blank stare  Assessment & Plan  · According to the patient's daughter, who is one of our [facility] primary care physicians, patient had been having occasional confusion, body shakes and blank stare  · When I saw the patient, patient was completely coherent, oriented x3, and conversant  No confusion at the time  Neurological examination was essentially normal   · CT scan of the head: No acute findings    · History of stroke in the past   · Questionable delirium versus possibility of a seizure  Possible due to dehydration as patient is clinically dry and had been having diarrhea this past several days and confusion had resolved at this point with IV fluid hydration  · Neurology consult  · Monitor mental status  · With polypharmacy, I also consulted geriatric physician  As per discussion with patient's daughter, patient takes oxycodone and gabapentin on as-needed basis for her back pains  She told me, that her mom only usually takes once a day of these medications  Patient also on muscle relaxant as needed  Initial medication list included both Lexapro and Janet Fort, which is an SSRI and an SNRI and thus this was discussed with the patient's daughter and was told that patient is not on Lexapro anymore  Patient is also on Nelly Hobgood and Edis which is an anticholinergic medication and can cause confusion  Patient's medications may need to be reviewed extensively and possibly be updated  Bilateral lower extremity edema  Assessment & Plan  · Patient has history of chronic bilateral lower extremity edema, but had been worsening lately  According to the patient's daughter, who is a doctor, no history of congestive heart failure  · Check duplex scan to rule out DVTs  · For further evaluation and management if duplex scan is negative  · Patient was just prescribed with chlorthalidone recently and took 1 tablet  However, we will hold off in the meantime as patient actually was found to be clinically dry with patient having diarrhea and with acute kidney injury  According to the patient's daughter, patient is not on hydrochlorothiazide anymore  Normocytic anemia  Assessment & Plan  · Patient had anemia since July 2022  · Presently normocytic with normal RDW  · Stable as compared to previous levels since July 2022  · According to the patient, at times she would have occasional hemorrhoidal bleeding  But no active bleeding at this point  · Monitor    · For further evaluation and management if this worsens significantly  Type 2 diabetes mellitus ([facility] [address] )  Assessment & Plan  Lab Results   Component Value Date    HGBA1C 7 7 (H) 02/20/2023       No results for input(s): POCGLU in the last 72 hours  Blood Sugar Average: Last 72 hrs:    · Hold off patient's metformin while in the hospital   · Insulin sliding scale  · Accu-Cheks  · Hypoglycemia protocol  · Adjust treatment accordingly  VTE Pharmacologic Prophylaxis: VTE Score: 4 Moderate Risk (Score 3-4) - Pharmacological DVT Prophylaxis Ordered: heparin  Code Status: Level 1 - Full Code   Discussion with family: Updated  (daughter) at bedside  Anticipated Length of Stay: Patient will be admitted on an inpatient basis with an anticipated length of stay of greater than 2 midnights secondary to Above findings and plans       Total Time Spent on Date of Encounter in care of patient: 75 minutes This time was spent on one or more of the following: performing physical exam; counseling and coordination of care; obtaining or reviewing history; documenting in the medical record; reviewing/ordering tests, medications or procedures; communicating with other healthcare professionals and discussing with patient's family/caregivers  Chief Complaint: Confusion  History of Present Illness: Mady Spaulding is a 76 y o  female with a PMH significant for anemia, chronic opiate dependence, chronic pain with chronic back pains, diabetes mellitus, IBS, stroke, urinary tract infection who presents with confusion  During my interview with the patient, she allowed her daughter, who is one of our [facility] primary care physicians, to tell her HPI  According to the patient's daughter, recently, her mother was noted to have occasional confusion and at times would have whole body shakes and blank stares  Approximately 3 weeks ago, patient was treated with antibiotic for urinary tract infection    A few days ago (last Sunday), patient's urine was noted to be foul-smelling and thus a urinalysis was done, which was not impressive for a UTI, but patient was prescribed with doxycycline  Patient did not actually take the doxycycline antibiotic as she felt that there might be an interaction with the antibiotic medication and her water pill and that she was also having diarrhea  According to the patient, she started having diarrhea also last Sunday, with at times watery stools and at times soft stools  Patient admits that at times she would have some blood-tinged stools due to her history of hemorrhoids  Today, patient took a dose of Questran and thus patient's diarrhea had stopped  According to the patient's daughter, patient also had not been eating and drinking well  When asked, no actual fever, but according to the patient, she feels cold at times and according to the patient's daughter, would have shakes, like somebody with chills  Patient's daughter also told me, that her mom was noted to have worsening bilateral lower extremity edema  Thus, patient's primary care provider, started the patient on chlorthalidone and patient took a tablet of this and that her previous prescription of hydrochlorothiazide was discontinued  Patient denied any headaches or any loss of consciousness  Patient denied any cough or colds or any nausea or vomiting or any abdominal pains  She denied any chest pains or shortness of breath  Patient told me, that she had an episode of dizziness approximately 2 weeks ago, but this has resolved  Patient has history of dizziness based on patient's past medical history and her being on meclizine as needed  Patient denies any other symptoms other than ones mentioned above  Review of Systems:  Review of Systems     10 point review systems done and they were negative except for the ones I mentioned in my history of present illness  No focal weakness or numbness  Please see notes above      Past Medical and Surgical History:   Past Medical History:   Diagnosis Date   • Acid reflux    • Anemia     hx of iron-deficient   • Anxiety    • Arthritis    • Asthma     last needed inhaler last year 2020   • Basal cell carcinoma     upper lip   • Chronic narcotic dependence (HCC)    • Chronic pain    • Colon polyp    • Cystocele    • Diabetes mellitus ([address] )     stable   • Disease of thyroid gland     hypothyroidism   • Diverticulosis    • Dizziness     at times   • Dysfunctional uterine bleeding     last assessed - [id number]   • Dysphagia    • Fibromyalgia    • Gastric ulcer    • Gastroparesis    • History of colonic polyps     last assessed - [id number]   • History of gastroesophageal reflux (GERD)    • Hypercholesterolemia    • Hyperlipidemia    • Hypertension    • IBS (irritable bowel syndrome)    • Pneumobilia 06/18/2022   • Post laminectomy syndrome    • S/P insertion of spinal cord stimulator 07/18/2018   • Seasonal allergies    • Shortness of breath     exertional   • Spinal stenosis    • Status post lumbar spinal fusion 03/16/2018   • Stroke [facility])     pt states slight stroke March 2022       Past Surgical History:   Procedure Laterality Date   • APPENDECTOMY     • BACK SURGERY     • BREAST CYST EXCISION Left    • CHOLECYSTECTOMY     • COLONOSCOPY     • ESOPHAGOGASTRODUODENOSCOPY N/A 09/28/2016    Procedure: ESOPHAGOGASTRODUODENOSCOPY (EGD); Surgeon: Romulo Pang MD;  [facility]: [facility];   Service:    • HERNIA REPAIR     • HYSTERECTOMY      TTAH-BSO age 25   • LAMINECTOMY     • LUMBAR LAMINECTOMY     • OOPHORECTOMY      age 25   • AZ ARTHRODESIS POSTERIOR/PSTLAT TQ 1NTRSPC THORACIC N/A 06/04/2018    Procedure: Reopening of lumbar incision for T12-L5 posterior instrumented fixation and fusion and T12-L4 posterior decompression;  Surgeon: Christelle Farias MD;  Location: [facility];  Service: Neurosurgery   • AZ COLONOSCOPY FLX DX W/COLLJ SPEC WHEN PFRMD N/A 03/02/2016    Procedure: EGD AND COLONOSCOPY;  Surgeon: Romulo Pang MD; Location: [facility]; Service: Gastroenterology   • MS DILATION [facility] UNGUIDED SOUND/BOUGIE 1/MULT PASS N/A 09/28/2016    Procedure: DILATATION ESOPHAGEAL;  Surgeon: Amalia Sanford MD;  [facility]: [facility]; Service: Gastroenterology   • MS ESOPHAGOGASTRODUODENOSCOPY TRANSORAL DIAGNOSTIC N/A 07/18/2016    Procedure: ESOPHAGOGASTRODUODENOSCOPY (EGD); Surgeon: Amalia Sanford MD;  [facility]: [facility]; Service: Gastroenterology   • MS INSJ/RPLCMT SPI NPGR DIR/INDUXIVE COUPLING Left 06/04/2018    Procedure: removal of left buttock implantable pulse generator and placement of new  implantable pulse generator;  Surgeon: Qian Dumas MD;  [facility]: [facility];  Service: Neurosurgery       Meds/Allergies:  Prior to Admission medications    Medication Sig Start Date End Date Taking? Authorizing Provider   doxycycline hyclate (VIBRAMYCIN) 100 mg capsule Take 1 capsule (100 mg total) by mouth every 12 (twelve) hours for 10 days 2/22/23 3/4/23  Charmayne Rams, CRNP   gabapentin (NEURONTIN) 300 mg capsule Take 1 capsule (300 mg total) by mouth 3 (three) times a day 10/24/22   Charmayne Rams, CRNP   metoprolol tartrate (LOPRESSOR) 25 mg tablet Take 0 5 tablets (12 5 mg total) by mouth every 12 (twelve) hours 1/4/23   Charmayne Rams, CRNP   albuterol (PROVENTIL HFA,VENTOLIN HFA) 90 mcg/act inhaler Inhale 2 puffs every 6 (six) hours as needed for wheezing or shortness of breath 6/29/22   Charmayne Rams, CRNP   aspirin 81 mg chewable tablet Chew 1 tablet (81 mg total) daily 8/9/22 10/13/22  Charmayne Rams, CRNP   atorvastatin (LIPITOR) 40 mg tablet TAKE ONE TABLET BY MOUTH ONCE DAILY 11/22/22   Charmayne Rams, CRNP   baclofen 10 mg tablet Take one tablet by mouth three times a day as needed for muscle spasms 8/9/22   Charmayne Rams, CRNP   Blood Glucose Monitoring Suppl (OneTouch Verio Reflect) w/Device KIT Check blood sugars twice daily  Please substitute with appropriate alternative as covered by patient's insurance   Dx: E11 65 2/20/23   NANY Allison   chlorthalidone (HYGROTEN) 15 MG tablet Take 1 5 tablets (22 5 mg total) by mouth daily 2/17/23   NANY Allison   cholestyramine Marisol Poly) 4 g packet Take 1 packet (4 g total) by mouth 3 (three) times a day with meals 10/27/22   Sonny Baires,    escitalopram (Lexapro) 20 mg tablet Take 0 5 tablets (10 mg total) by mouth daily 6/29/22   NANY Allison   glucose blood (OneTouch Verio) test strip Check blood sugars twice daily  Please substitute with appropriate alternative as covered by patient's insurance  Dx: E11 65 2/20/23   NANY Allison   hydrochlorothiazide (HYDRODIURIL) 12 5 mg tablet Take 2 tablets (25 mg total) by mouth daily as needed (edema/swelling) 1/24/23   NANY Allison   latanoprost (XALATAN) 0 005 % ophthalmic solution Administer 1 drop to both eyes daily at bedtime 10/2/20 6/29/22  Sonny Baires, DO   levothyroxine 25 mcg tablet Take 1 5 tablets (37 5 mcg total) by mouth daily 8/9/22 10/13/22  NANY Allison   lisinopril (ZESTRIL) 20 mg tablet TAKE ONE TABLET BY MOUTH ONCE DAILY 2/16/23   NANY Allison   meclizine (ANTIVERT) 25 mg tablet Take 1 tablet (25 mg total) by mouth 4 (four) times a day as needed for dizziness 6/21/22 10/13/22  Maximilian Rendon MD   metFORMIN (GLUCOPHAGE) 1000 MG tablet Take 1 tablet (1,000 mg total) by mouth 2 (two) times a day with meals 2/10/23   NANY Allison   metoclopramide (REGLAN) 10 mg tablet Take 1 tablet (10 mg total) by mouth 4 (four) times a day as needed (Nausea and vomiting) 9/9/22   NANY Allison   Milnacipran HCl (Savella) 100 MG TABS Take 1 tablet (100 mg total) by mouth daily 9/13/21   Sonny Baires,    nystatin (MYCOSTATIN) powder Apply topically 2 (two) times a day 6/21/22   MD Octavio Sawyer Lancets 81Q MISC Check blood sugars twice daily   Please substitute with appropriate alternative as covered by patient's insurance  Dx: E11 65 2/20/23   NANY Castelan   oxyCODONE-acetaminophen (PERCOCET)  mg per tablet Take 1 tablet by mouth every 6 (six) hours as needed for severe pain Max Daily Amount: 4 tablets 1/24/23   NANY Castelan   pantoprazole (PROTONIX) 40 mg tablet Take 1 tablet (40 mg total) by mouth every 12 (twelve) hours 7/18/22 10/16/22  Reese Peña MD   prednisoLONE (ORAPRED) 15 mg/5 mL oral solution 3 tsp twice/day for 2 days, 2 tsp twice/day for 2 days, 1 tsp twice/day x2 days than 1/2 tsp daily x 2 days 12/22/22   NANY Castelan   saccharomyces boulardii (FLORASTOR) 250 mg capsule Take 1 capsule (250 mg total) by mouth 2 (two) times a day 6/21/22   Vanda Laureano MD   trospium chloride (SANCTURA) 20 mg tablet Take 1 tablet (20 mg total) by mouth 2 (two) times a day 9/28/22   NANY Castelan   zolpidem (AMBIEN) 10 mg tablet Take 1 tablet (10 mg total) by mouth daily at bedtime as needed for sleep 1/18/23   NANY Castelan   sucralfate (CARAFATE) 1 g tablet Take 1 tablet (1 g total) by mouth 4 (four) times a day 7/18/22 10/13/22  Milka Lipscomb MD     I have reviewed home medications using recent Epic encounter  Medication list was reviewed with the patient's daughter  Patient is not on Lexapro anymore  Patient only takes gabapentin at most once a day, thus patient's daughter told me to just ordered the gabapentin once a day as needed  Patient is not on hydrochlorothiazide anymore  Allergies:    Allergies   Allergen Reactions   • Penicillins Anaphylaxis, Hives and Other (See Comments)     Other reaction(s): Unknown Reaction   • Sulfa Antibiotics Anaphylaxis and Other (See Comments)     Other reaction(s): Unknown Reaction   • Aspartame - Food Allergy Other (See Comments) and Hypertension     Slurred speech, weakness, stroke sx   • Iodinated Contrast Media Hives     Pt has taken prep prior for contrast and has not had any break through reaction   • Keflex [Cephalexin] Hives Social History:  Marital Status:    Substance Use History:   Social History     Substance and Sexual Activity   Alcohol Use Not Currently    Comment: Denied history of alcohol use     Social History     Tobacco Use   Smoking Status Former   • Types: Cigarettes   • Quit date: 1970   • Years since quittin 1   Smokeless Tobacco Never   Tobacco Comments    Denied history of current ever day smoker, Former smoker and Never smoker all documented in [employer]     Social History     Substance and Sexual Activity   Drug Use No    Comment: Denied history of drug use       Family History:  Family History   Problem Relation Age of Onset   • Lung cancer Mother 55   • Pulmonary embolism Father    • No Known Problems Sister    • No Known Problems Daughter    • No Known Problems Daughter    • Stroke Maternal Grandmother    • Heart attack Maternal Grandfather    • No Known Problems Paternal Grandmother    • No Known Problems Paternal Grandfather    • No Known Problems Maternal Aunt    • Diabetes Family         Diabetes mellitus   • Hypertension Family    • Stroke Family         Stroke complications       Physical Exam:     Vitals:   Blood Pressure: 167/84 (23)  Pulse: 91 (23)  Temperature: 98 8 °F (37 1 °C) (23)  Temp Source: Oral (23 1427)  Respirations: 18 (23)  Height: 5' (152 4 cm) (23)  Weight - Scale: 86 8 kg (191 lb 5 8 oz) (23)  SpO2: 96 % (23)    Physical Exam  Vitals and nursing note reviewed  Exam conducted with a chaperone present  Constitutional:       General: She is not in acute distress  Appearance: She is ill-appearing  She is not toxic-appearing or diaphoretic  HENT:      Mouth/Throat:      Mouth: Mucous membranes are dry  Pharynx: Oropharynx is clear  No oropharyngeal exudate or posterior oropharyngeal erythema  Eyes:      General: No scleral icterus  Right eye: No discharge           Left eye: No discharge  Extraocular Movements: Extraocular movements intact  Conjunctiva/sclera: Conjunctivae normal       Pupils: Pupils are equal, round, and reactive to light  Cardiovascular:      Rate and Rhythm: Normal rate and regular rhythm  Heart sounds: Normal heart sounds  No murmur heard  No friction rub  No gallop  Pulmonary:      Effort: Pulmonary effort is normal  No respiratory distress  Breath sounds: Normal breath sounds  No stridor  No wheezing, rhonchi or rales  Abdominal:      General: Bowel sounds are normal  There is no distension  Palpations: Abdomen is soft  Tenderness: There is no abdominal tenderness  There is no guarding or rebound  Musculoskeletal:      Right lower leg: No edema  Left lower leg: No edema  Skin:     General: Skin is warm and dry  Coloration: Skin is not pale  Findings: No erythema or rash  Neurological:      General: No focal deficit present  Mental Status: She is alert and oriented to person, place, and time  Mental status is at baseline  Cranial Nerves: No cranial nerve deficit  Sensory: No sensory deficit  Motor: No weakness  Comments: Patient was able to tell me where she is and was very specific, telling me the hospital, the building floor and room number  She was able to tell me her birthdate and the names of her children  She was also able to tell me that the president of the [facility] is Mr Dee Watkins, although she told me that her favorite president was Emmitt Fothergill  All the information that she told me were verified from the patient's daughter at bedside to be right  Patient was coherent and able to answer questions appropriately  Patient was conversant at the time  No tremors noted  Psychiatric:         Mood and Affect: Mood normal          Behavior: Behavior normal          Thought Content:  Thought content normal               Additional Data:     Lab Results:  Results from last 7 days   Lab Units 02/23/23  1533   WBC Thousand/uL 6 83   HEMOGLOBIN g/dL 10 0*   HEMATOCRIT % 32 8*   PLATELETS Thousands/uL 222   NEUTROS PCT % 61   LYMPHS PCT % 23   MONOS PCT % 11   EOS PCT % 4     Results from last 7 days   Lab Units 02/23/23  1533   SODIUM mmol/L 138   POTASSIUM mmol/L 5 0   CHLORIDE mmol/L 103   CO2 mmol/L 28   BUN mg/dL 40*   CREATININE mg/dL 1 68*   ANION GAP mmol/L 7   CALCIUM mg/dL 9 7   ALBUMIN g/dL 3 7   TOTAL BILIRUBIN mg/dL 0 39   ALK PHOS U/L 89   ALT U/L 9   AST U/L 16   GLUCOSE RANDOM mg/dL 99     Results from last 7 days   Lab Units 02/23/23  1533   INR  0 93         Results from last 7 days   Lab Units 02/20/23  0958   HEMOGLOBIN A1C % 7 7*     Results from last 7 days   Lab Units 02/23/23  1533   LACTIC ACID mmol/L 1 3       Lines/Drains:  Invasive Devices     Peripheral Intravenous Line  Duration           Peripheral IV 02/23/23 Left Antecubital <1 day                    Imaging: Reviewed radiology reports from this admission including: chest xray and CT head  XR chest 1 view portable   Final Result by Agusto Sethi MD (02/23 1536)      No acute cardiopulmonary disease                    Workstation performed: [id number]         CT head without contrast   ED Interpretation by Kathy Hemphill PA-C (02/23 1635)   CT head without contrast  Status: Final result    PACS Images     Show images for CT head without contrast  Study Result    Wet Read  CT head without contrast   Status: Final result     PACS Images      Show images for CT head without contrast   Study Result     Narrative & Impression   CT BRAIN - WITHOUT CONTRAST       INDICATION:   ams        COMPARISON:  7/25/2022       TECHNIQUE:  CT examination of the brain was performed   In addition to axial images, sagittal and coronal 2D reformatted images were created and submitted for interpretation        Radiation dose length product (DLP) for this visit:  619 mGy-cm    This examination, like all CT scans performed in the [facility], was performed utilizing techniques to minimize radiation dose exposure, including the use of iterative   reconstruction and automated exposure control          IMAGE QUALITY:  Diagnostic        FINDINGS:       PARENCHYMA: Decreased attenuation is noted in periventricular and subcortical white    matter demonstrating an appearance that is statistically most likely to represent mild microangiopathic change        No CT signs of acute infarction   No   intracranial mass, mass effect or midline shift   No acute parenchymal hemorrhage        VENTRICLES AND EXTRA-AXIAL SPACES:  Normal for the patient's age        VISUALIZED ORBITS: Normal visualized orbits        PARANASAL SINUSES: Normal visualized paranasal sinuses        CALVARIUM AND EXTRACRANIAL SOFT TISSUES:  Normal        IMPRESSION:       No acute intracranial abnormality                        Workstation performed: [id number]         Imaging     CT head without contrast (Order: [id number]) - 2/23/2023     Result History     CT head without contrast (Order #[id number]) on 2/23/2023 - Order Result History Report     Order Report     Order Details   Order Questions     Question Answer   What is the patient's sedation requirement?  No Sedation   Release to patient through Mychart Immediate   Exam reason ams   Note: Enter reason    for exam   Decision Support Exception Emergency Medical Condition (MA)           Result Information     Status Priority Source   Final result (2/23/2023 1555) STAT     Other Results from 2/23/20 23     UA w Reflex to Microscopic w Reflex to Culture  In process 2/23/2023   Blood culture #2  In process 2/23/2023   CBC and differential  Final result 2/23/2023   Protime-INR  In process 2/23/2023   APTT  In process 2/23/2023   Blood culture #1  In process 2/23/2023   Comprehensive metabolic panel  Final result 2/23/2023   Lactic acid  Final result 2/23/2023   HS Troponin 0hr (reflex protocol)  Final result 2/23/2023   TSH  Final result 2/23/2023   Magnesium  Final result 2/23/2023    important suggestion  Warning: Additional results from 2/23/2023 are available but are not displayed in this report  Reason for Exam     ams         CT head without contrast: Patient Communication     Add Comments  Not seen         Signed by     Signed Date/Time Phone Pager   Reanna Tee 2/23/20 23 15:55 [phone]       Exam Information     Status Exam Begun Exam Ended Performing Tech   Final [99] 2/23/2023 14:56 2/23/2023 15:08 8 Anjali Mccarty       Screening Form Questions     No questions ha   ve been answered for this form  External Results Report       Open External Results Report     Encounter       View Encounter                   Patient Care Timeline     No data selected in time range     Pre-op Summary       Pre-op             Recovery Summary       Recovery               Routing History     None        Interpreted by Mihai Fry PA-C on 2/23/2023  4:35 PM  Narrative & Impression  CT BRAIN - WITHOUT CONTRAST     INDICATION:   ams      COMPARISON:  7/25/2022     TECHNIQUE:  CT examination of the brain was performed  In addition to axial images, sagittal and coronal 2D reformatted images were created and submitted for interpretation      Radiation dose length product (DLP) for this visit:  972 mGy-cm   This examination, like all CT scans perform   ed in the [facility], was performed utilizing techniques to minimize radiation dose exposure, including the use of iterative   reconstruction and automated exposure control        IMAGE QUALITY:  Diagnostic      FINDINGS:     PARENCHYMA: Decreased attenuation is noted in periventricular and subcortical white matter demonstrating an appearance that is statistically most likely to represent mild microangiopathic change      No CT signs of acute infarction  No intracranial mass, mass effect or midline shift    No acute parenchymal hemorrhage      VENTRICLES AND EXTRA-AXIAL SPACES:  Normal for the patient's age      VISUALIZED ORBITS: Normal visualized orbits      PARANASAL SINUSES: Normal visualized paranasal sinuses      CALVARIUM AND EXTRACRANIAL SOFT TISSUES:  Normal      IMPRESSION:     No acute intracranial abnormality                  Workstation performed: [id number]       Imaging    CT head without contrast (Order: [id number]) - 2/23/2023    Result History    CT he   ad without contrast (Order #[id number]) on 2/23/2023 - Order Result History Report    Order Report    Order Details  Order Questions    Question Answer  What is the patient's sedation requirement? No Sedation  Release to patient through Mychart Immediate  Exam reason ams  Note: Enter reason for exam  Decision Support Exception Emergency Medical Condition (MA)         Result Information    Status Priority Source  Final result (2/23/2023 1634) STAT     Other Results from 2/23/2023     UA w Reflex to Microscopic w Reflex to Culture  In process 2/23/2023   Blood culture #2  In process 2/23/2023   CBC and differential  Final result 2/23/2023   Protime-INR  In process 2/23/2023   APTT  In process 2/23/2023   Blood culture #1  In process 2/23/2023   Comprehensive metabolic panel  Final result 2/23/2023   Lactic acid  Final result 2/23/2023   HS Troponin 0hr (reflex protocol)  Final result 2/23/2023   TSH  Final result 2/23/2023   Magnesium  Final result 2/23/2023   important suggestion  Sherry salgado: Additional results from 2/23/2023 are available but are not displayed in this report        Reason for Exam    ams        CT head without contrast: Patient Communication    Add Comments  Not seen        Signed by    Signed Date/Time  Phone Pager  Layla Lemus 2/23/2023 15:55 [phone]       Exam Information    Status Exam Begun  Exam Ended  Performing Tech  Final [99] 2/23/2023 14:56 2/23/2023 15:08 [address]      Screening Form Questions    No questions have been answered for this form  External Results Report      Open External Results Report     Encounter      View Encounter                 Patient Care Timeline    No data selected in time range    Pre-op Summary      Pre-op            Recovery Summary      Recovery             Routing History    None            Final Result by Maria De Jesus Schreiber MD (02/23 1555)      No acute intracranial abnormality  Workstation performed: [id number]         VAS lower limb venous duplex study, complete bilateral    (Results Pending)       EKG and Other Studies Reviewed on Admission:   · EKG: Normal sinus rhythm, left axis deviation, LBBB  When compared to previous EKG, patient has chronic LBBB       ** Please Note: This note has been constructed using a voice recognition system   **

## 2023-02-24 NOTE — UTILIZATION REVIEW
Initial Clinical Review    Admission: Date/Time/Statement:   Admission Orders (From admission, onward)     Ordered        02/23/23 1812  INPATIENT ADMISSION  Once                      Orders Placed This Encounter   Procedures   • INPATIENT ADMISSION     Standing Status:   Standing     Number of Occurrences:   1     Order Specific Question:   Level of Care     Answer:   Med Surg [16]     Order Specific Question:   Estimated length of stay     Answer:   More than 2 Midnights     Order Specific Question:   Certification     Answer:   I certify that inpatient services are medically necessary for this patient for a duration of greater than two midnights  See H&P and MD Progress Notes for additional information about the patient's course of treatment  ED Arrival Information     Expected   -    Arrival   2/23/2023 14:07    Acuity   Urgent            Means of arrival   Ambulance    Escorted by   [facility]    Service   Hospitalist    Admission type   Emergency            Arrival complaint   confusion           Chief Complaint   Patient presents with   • Medical Problem     Arrives from home via EMS  Has a stroke hx, family is concerned that patient has been more tired and confused over the last few days per EMS and would like her to be further evaluated  Being treated for a UTI currently  A&Ox4  Neuro exam intact  Initial Presentation: 76 y o  female with hx anemia, chronic opiate dependence, chronic pain with chronic back pains, DM, IBS, stroke,UTI who presents to ED from home via EMS with confusion  Per dgt who is a physician, pt has occasional confusion and at times would have whole body shakes and blank stares  Pt tx for UTI 3 weeks ago, developed foul smelling urine 2/19/23, UA that time unimpressive but pt started on Doxycycline but didn't start it 2/2 fear of med interaction with water pill and having diarrhea that started 2/19 and stopped today after she took Questran   Dgt also reports poor po intake, worsening BLE edema  On exam, pt ill appearing, dry mucous membranes, alert, oriented , no tremors noted   Labs creat 1 68 from baseline 0 99  CXR and CT head show nothing acute   ECG- NSR, L BBB  Pt given IVF in ED  Pt admitted as inpatient with SADAF, diarrhea, intermittent confusion   planh - IVF, Monitior BMP, hold home Lisinopril and chlorthalidone  Stool studies  Neurology consult  gerontology consult for polypharmacy   Date: 2/24  Day 2:    Neurology consult- Patient not eligible for MRI due to non-compatible spinal cord stimulator  Infectious work-up pending  On exam, patient is HEALY x3, MSE is age appropriate, sensory, motor and reflexes intact BL, overall benign exam, patient is at baseline with no focal deficits  AMS more likely from infectious/metabolic/delirium etiology rather than new CVA/seizure  Continue w/ infectious/metabolic workup   Gerontology consult- SADAF- GFR today is 44 with a creatinine 1 28 patient will need continued fluid replacement  Baseline GFR is in the 50s  Recommendations- Milnacipran needs to be adjusted to renal clearance , pt taking 50 mg of the medication at home  Titrate Ditropan down decrease it to 5 mg once a day and discontinue it , better med would be Myrbetriq 25 mg orally daily- less anticholinergic activity  Medicine-Pt reports feeling the same as yesterday, feels weak, continues with leg swelling  No further diarrhea  Bibasilar crackles noted  No dyspnea or tachypnea noted  Not requiring oxygen  Plan to continue IVF but at reduced rate   BMP in am   Pt has had no further diarrhea, stool studies not sent   LE dopplers neg for DVT   Pt ordered echo today  PT eval placed   Etha Steve Knee hi compression stockings        ED Triage Vitals [02/23/23 1427]   Temperature Pulse Respirations Blood Pressure SpO2   98 7 °F (37 1 °C) 83 18 111/58 93 %      Temp Source Heart Rate Source Patient Position - Orthostatic VS BP Location FiO2 (%)   Oral Monitor Sitting Right arm -- Pain Score       No Pain          Wt Readings from Last 1 Encounters:   02/24/23 85 4 kg (188 lb 4 4 oz)     Additional Vital Signs:   Date/Time Temp Pulse Resp BP MAP (mmHg) SpO2   02/24/23 07:10:15 99 2 °F (37 3 °C) 84 16 109/71 84 95 %   02/23/23 21:52:29 98 8 °F (37 1 °C) 91 -- 167/84 112 96 %   02/23/23 2150 -- 90 -- 167/84 -- --   02/23/23 2026 -- -- -- -- -- --   02/23/23 1902 -- -- -- -- -- --   02/23/23 19:01:21 97 9 °F (36 6 °C) 86 -- 174/91 Abnormal  119 99 %   02/23/23 19:01:12 97 9 °F (36 6 °C) 86 18 -- -- 97 %   02/23/23 1830 -- 60 18 146/67 97 98 %   02/23/23 1800 -- 60 18 140/64 92 96 %   02/23/23 1700 -- 58 18 129/62 89 96 %   02/23/23 1630 -- 56 18 135/64 88 96 %     Date and Time Eye Opening Best Verbal Response Best Motor Response Loki Coma Scale Score   02/24/23 0824 4 5 6 15   02/23/23 2026 4 5 6 15   02/23/23 1430 4 5 6 15     Pertinent Labs/Diagnostic Test Results:    2/23 ECG- Normal sinus rhythm  Left axis deviation  Left bundle branch block  XR chest 1 view portable   Final Result by Karie Baker MD (02/23 1536)      No acute cardiopulmonary disease                    Workstation performed: [id number]         CT head without contrast   ED Interpretation by Shelley Garduno PA-C (02/23 1635)   CT head without contrast  Status: Final result    PACS Images     Show images for CT head without contrast  Study Result    Wet Read  CT head without contrast   Status: Final result     PACS Images      Show images for CT head without contrast   Study Result     Narrative & Impression   CT BRAIN - WITHOUT CONTRAST       INDICATION:   ams        COMPARISON:  7/25/2022       TECHNIQUE:  CT examination of the brain was performed   In addition to axial images, sagittal and coronal 2D reformatted images were created and submitted for interpretation        Radiation dose length product (DLP) for this visit: 2610 3765 mGy-cm    This examination, like all CT scans performed in the [facility], was performed utilizing techniques to minimize radiation dose exposure, including the use of iterative   reconstruction and automated exposure control          IMAGE QUALITY:  Diagnostic        FINDINGS:       PARENCHYMA: Decreased attenuation is noted in periventricular and subcortical white    matter demonstrating an appearance that is statistically most likely to represent mild microangiopathic change        No CT signs of acute infarction   No   intracranial mass, mass effect or midline shift   No acute parenchymal hemorrhage        VENTRICLES AND EXTRA-AXIAL SPACES:  Normal for the patient's age        VISUALIZED ORBITS: Normal visualized orbits        PARANASAL SINUSES: Normal visualized paranasal sinuses        CALVARIUM AND EXTRACRANIAL SOFT TISSUES:  Normal        IMPRESSION:       No acute intracranial abnormality                   Interpreted by Dorie Landry PA-C on 2/23/2023  4:35 PM  Narrative & Impression  CT BRAIN - WITHOUT CONTRAST     INDICATION:   ams      COMPARISON:  7/25/2022     TECHNIQUE:  CT examination of the brain was performed  In addition to axial images, sagittal and coronal 2D reformatted images were created and submitted for interpretation      Radiation dose length product (DLP) for this visit:  972 mGy-cm   This examination, like all CT scans perform   ed in the [facility], was performed utilizing techniques to minimize radiation dose exposure, including the use of iterative   reconstruction and automated exposure control        IMAGE QUALITY:  Diagnostic      FINDINGS:     PARENCHYMA: Decreased attenuation is noted in periventricular and subcortical white matter demonstrating an appearance that is statistically most likely to represent mild microangiopathic change      No CT signs of acute infarction  No intracranial mass, mass effect or midline shift    No acute parenchymal hemorrhage      VENTRICLES AND EXTRA-AXIAL SPACES:  Normal for the patient's age      VISUALIZED ORBITS: Normal visualized orbits      PARANASAL SINUSES: Normal visualized paranasal sinuses      CALVARIUM AND EXTRACRANIAL SOFT TISSUES:  Normal      IMPRESSION:     No acute intracranial abnormality                   Final Result by Ann Mcallister MD (02/23 1555)      No acute intracranial abnormality  Workstation performed: [id number]         VAS lower limb venous duplex study, complete bilateral    (2/24/24  RIGHT LOWER LIMB:  No evidence of acute or chronic deep vein thrombosis  No evidence of superficial thrombophlebitis noted  No evidence of valvular incompetence noted in the deep veins  Popliteal, posterior tibial and anterior tibial arterial Doppler waveforms are  triphasic/hyperemic  LEFT LOWER LIMB:  No evidence of acute or chronic deep vein thrombosis  No evidence of superficial thrombophlebitis noted  No evidence of valvular incompetence noted in the deep veins  Popliteal, posterior tibial and anterior tibial arterial Doppler waveforms are  Triphasic/hyperemic   result has not been signed   Information might be incomplete     )         Results from last 7 days   Lab Units 02/24/23  0459 02/23/23  1533 02/20/23  0958   WBC Thousand/uL 6 14 6 83 5 90   HEMOGLOBIN g/dL 9 6* 10 0* 10 6*   HEMATOCRIT % 30 7* 32 8* 35 5   PLATELETS Thousands/uL 213 222 235   NEUTROS ABS Thousands/µL  --  4 20 3 67         Results from last 7 days   Lab Units 02/24/23  0459 02/23/23  1533 02/20/23  0958   SODIUM mmol/L 137 138 141   POTASSIUM mmol/L 5 5* 5 0 4 3   CHLORIDE mmol/L 104 103 109*   CO2 mmol/L 27 28 25   ANION GAP mmol/L 6 7 7   BUN mg/dL 35* 40* 31*   CREATININE mg/dL 1 28 1 68* 1 62*   EGFR ml/min/1 73sq m 39 28 29   CALCIUM mg/dL 9 0 9 7 9 6   MAGNESIUM mg/dL  --  2 3 2 0     Results from last 7 days   Lab Units 02/23/23  1533   AST U/L 16   ALT U/L 9   ALK PHOS U/L 89   TOTAL PROTEIN g/dL 6 3*   ALBUMIN g/dL 3 7   TOTAL BILIRUBIN mg/dL 0 39 Results from last 7 days   Lab Units 02/24/23  0707   POC GLUCOSE mg/dl 117     Results from last 7 days   Lab Units 02/24/23  0459 02/23/23  1533 02/20/23  0958   GLUCOSE RANDOM mg/dL 103 99 212*         Results from last 7 days   Lab Units 02/20/23  0958   HEMOGLOBIN A1C % 7 7*   EAG mg/dl 174     No results found for: BETA-HYDROXYBUTYRATE                   Results from last 7 days   Lab Units 02/23/23 2012 02/23/23  1732 02/23/23  1533   HS TNI 0HR ng/L  --   --  9   HS TNI 2HR ng/L  --  9  --    HSTNI D2 ng/L  --  0  --    HS TNI 4HR ng/L 8  --   --    HSTNI D4 ng/L -1  --   --          Results from last 7 days   Lab Units 02/23/23  1533   PROTIME seconds 12 6   INR  0 93   PTT seconds 30     Results from last 7 days   Lab Units 02/23/23  1533   TSH 3RD GENERATON uIU/mL 3  192         Results from last 7 days   Lab Units 02/23/23  1533   LACTIC ACID mmol/L 1 3                                         Results from last 7 days   Lab Units 02/23/23  1614 02/21/23  1450   CLARITY UA  Clear Clear   COLOR UA  Light Yellow Yellow   SPEC GRAV UA  1 017 1 018   PH UA  5 0 5 5   GLUCOSE UA mg/dl Negative Negative   KETONES UA mg/dl Negative Negative   BLOOD UA  Negative Negative   PROTEIN UA mg/dl Negative Trace*   NITRITE UA  Negative Negative   BILIRUBIN UA  Negative Negative   UROBILINOGEN UA (BE) mg/dl <2 0 <2 0   LEUKOCYTES UA  Negative Negative   WBC UA /hpf  --  1-2   RBC UA /hpf  --  None Seen   BACTERIA UA /hpf  --  None Seen   EPITHELIAL CELLS WET PREP /hpf  --  Occasional                                 Results from last 7 days   Lab Units 02/23/23  1548 02/23/23  1533   BLOOD CULTURE  Received in Microbiology Lab  Culture in Progress  Received in Microbiology Lab  Culture in Progress                 ED Treatment:   Medication Administration from 02/23/2023 1407 to 02/23/2023 1849       Date/Time Order Dose Route Action     02/23/2023 1642 EST sodium chloride 0 9 % bolus 1,000 mL 0 mL Intravenous Stopped 02/23/2023 1547 EST sodium chloride 0 9 % bolus 1,000 mL 1,000 mL Intravenous New Bag     02/23/2023 1644 EST multi-electrolyte (PLASMALYTE-A/ISOLYTE-S PH 7 4) IV solution 125 mL/hr Intravenous New Bag        Past Medical History:   Diagnosis Date   • Acid reflux    • Anemia     hx of iron-deficient   • Anxiety    • Arthritis    • Asthma     last needed inhaler last year 2020   • Basal cell carcinoma     upper lip   • Chronic narcotic dependence (HCC)    • Chronic pain    • Colon polyp    • Cystocele    • Diabetes mellitus ([facility] )     stable   • Disease of thyroid gland     hypothyroidism   • Diverticulosis    • Dizziness     at times   • Dysfunctional uterine bleeding     last assessed - [id number]   • Dysphagia    • Fibromyalgia    • Gastric ulcer    • Gastroparesis    • History of colonic polyps     last assessed - [id number]   • History of gastroesophageal reflux (GERD)    • Hypercholesterolemia    • Hyperlipidemia    • Hypertension    • IBS (irritable bowel syndrome)    • Pneumobilia 06/18/2022   • Post laminectomy syndrome    • S/P insertion of spinal cord stimulator 07/18/2018   • Seasonal allergies    • Shortness of breath     exertional   • Spinal stenosis    • Status post lumbar spinal fusion 03/16/2018   • Stroke [facility])     pt states slight stroke March 2022     Present on Admission:  • Type 2 diabetes mellitus (HCC)  • Normocytic anemia  • SADAF (acute kidney injury) ([facility])  • Bilateral lower extremity edema      Admitting Diagnosis: Diarrhea [R19 7]  Dehydration [E86 0]  Asthenia [R53 1]  Weakness [R53 1]  SADAF (acute kidney injury) ([facility] ) [N17 9]  AMS (altered mental status) [R41 82]  Age/Sex: 76 y o  female  Admission Orders:  Scheduled Medications:  aspirin, 81 mg, Oral, Daily  atorvastatin, 40 mg, Oral, Daily  heparin (porcine), 5,000 Units, Subcutaneous, Q8H [address]  insulin lispro, 1-5 Units, Subcutaneous, HS  insulin lispro, 1-6 Units, Subcutaneous, TID AC  latanoprost, 1 drop, Both Eyes, HS  levothyroxine, 37 5 mcg, Oral, Daily  metoprolol tartrate, 12 5 mg, Oral, Q12H ARY  Milnacipran HCl, 1 tablet, Oral, Daily  nystatin, , Topical, BID  oxybutynin, 5 mg, Oral, BID  pantoprazole, 40 mg, Oral, Q12H  saccharomyces boulardii, 250 mg, Oral, BID    multi-electrolyte (PLASMALYTE-A/ISOLYTE-S PH 7 4) IV solution  Dose: 75 mL/hr  Freq: Once Route: IV  Last Dose: 75 mL/hr (02/24/23 0855)  Start: 02/24/23 0845 End: 02/24/23 0855  Continuous IV Infusions:    multi-electrolyte (PLASMALYTE-A/ISOLYTE-S PH 7 4) IV solution  Rate: 75 mL/hr Dose: 75 mL/hr  Freq: Continuous Route: IV  Last Dose: Stopped (02/24/23 0855)  Start: 02/23/23 1645 End: 02/24/23 0841  PRN Meds:  oxyCODONE, 10 mg, Oral, Q6H PRN   And  acetaminophen, 325 mg, Oral, Q6H PRN  acetaminophen, 650 mg, Oral, Q6H PRN  albuterol, 2 puff, Inhalation, Q6H PRN  baclofen, 10 mg, Oral, TID PRN  gabapentin, 300 mg, Oral, Daily PRN  meclizine, 25 mg, Oral, 4x Daily PRN  metoclopramide, 10 mg, Oral, 4x Daily PRN  zolpidem, 10 mg, Oral, HS PRN x1 2/23    2 Gm Na , level 2 carb diet    IP CONSULT TO NEUROLOGY  IP CONSULT TO GERONTOLOGY    Network Utilization Review Department  ATTENTION: Please call with any questions or concerns to [phone] and carefully listen to the prompts so that you are directed to the right person  All voicemails are confidential   Rosita Finder all requests for admission clinical reviews, approved or denied determinations and any other requests to dedicated fax number below belonging to the campus where the patient is receiving treatment   List of dedicated fax numbers for the Facilities:  [address] DENIALS (Administrative/Medical Necessity) [phone]   [address] (Maternity/NICU/Pediatrics) [phone]   [address] 951 [facility] Ariana Serrano 77 [phone]   1306 [facility] [phone]     [address] [phone]   750 [facility] [address] [facility] [address] [facility] 134 105 [facility] [phone]
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
## 2023-02-24 NOTE — ASSESSMENT & PLAN NOTE
· Patient has history of chronic bilateral lower extremity edema, but had been worsening lately  According to the patient's daughter, who is a doctor, no history of congestive heart failure  · Check duplex scan to rule out DVTs  · For further evaluation and management if duplex scan is negative  · Patient was just prescribed with chlorthalidone recently and took 1 tablet  However, we will hold off in the meantime as patient actually was found to be clinically dry with patient having diarrhea and with acute kidney injury  According to the patient's daughter, patient is not on hydrochlorothiazide anymore

## 2023-02-24 NOTE — CONSULTS
NEUROLOGY RESIDENCY CONSULT NOTE     Name: Ander Perdomo   Age & Sex: 76 y o  female   MRN: [id number]  Unit/Bed#: W -01   Encounter: [id number]  Length of Stay: 1    ASSESSMENT & PLAN     Confusion with body shakes and blank stare  Assessment & Plan  Patient is a 76year old female with past medical history of anemia, opiate dependence disorder, DM II, IBS, recurrent UTI, multiple previous falls within the past few years, chronic pain s/p spinal cord stimulator which is non-MRI compatible, and previous stroke in April of 2022 who presents with new onset of confusion and body chills/shaking for the past few days  Treated for UTI with Abx 3 weeks ago, finished course as prescribed  Noticed foul smelling urine again a few days ago, UA in family med office on 02/21/2023 negative for acute UTI but prescribed doxycycline  Patient has had new episodes of intermittent confusion, slurred speech, and body chills/shaking for 2-3 days with a few episodes of watery diarrhea that resolved two days ago with Questran administration  In the ED: CT head w/o contrast was negative for acute abnormalities  Patient not eligible for MRI due to non-compatible spinal cord stimulator  Infectious work-up pending, patient has previous history of multiple falls, and concerns for polypharmacy given patient's age, Gerontology consulted, OT cognitive evaluation ordered  On exam, patient is HEALY x3, MSE is age appropriate, sensory, motor and reflexes intact BL, overall benign exam, patient is at baseline with no focal deficits  Plan:  MRI contraindicated at this time  AMS more likely from infectious/metabolic/delirium etiology rather than new CVA/seizure  Continue infectious/metabolic work up per [facility]  Neurology will sign off at this time  Ander Perdomo will not need outpatient follow up with Neurology  She will not require outpatient neurological testing         Pending for discharge: Per primary team    SUBJECTIVE     Reason for Consult / Principal Problem: Altered Mental Status  Hx and PE limited by: N/A    HPI: Oksana Steel is a 76 y o  right handed female with a past medical history of anemia, opiate dependence disorder, DM II, IBS, recurrent UTI, multiple previous falls within the past few years, chronic pain s/p spinal cord stimulator which is non-MRI compatible, and previous stroke in April of 2022 who presents with new onset of confusion and body chills/shaking for the past few days  The patient currently resides with her adult son and daughter and provides her own history  The patient states she was initially being treated for UTI a few weeks ago when she noticed foul smelling urine, and completed Abx course as prescribed  The patient then states she noticed her urine to be foul smelling again a few days ago, at which time she went to her OP provider and a UA showed no signs of acute UTI, however the patient was prescribed doxycycline  The patient states since that time, she has had worsening chills with full body shakes, and endorses episodes of confusion for the past two days that have been concerning her son with whom she resides  She denies weakness, paresthesia, facial droop, incontinence, or previous witnessed seizures to her knowledge, and her speech sounds normal to her today, though she states her son told her it sounded slurred intermittently during her episodes of confusion the past couple days  She states her biggest concern has been regaining her strength as she has been fatigued and somewhat deconditioned since her stroke in April a year ago, but she has been working with PT on her gait, strength, and mobility which she states has been helping with her balance to prevent further falls  She has no questions, comments, or concerns at this time       Inpatient consult to Neurology  Consult performed by: Dasha Valdovinos DO  Consult ordered by: Eddy Handy, MD          Historical Information   Past Medical History:   Diagnosis Date   • Acid reflux    • Anemia     hx of iron-deficient   • Anxiety    • Arthritis    • Asthma     last needed inhaler last year 2020   • Basal cell carcinoma     upper lip   • Chronic narcotic dependence (HCC)    • Chronic pain    • Colon polyp    • Cystocele    • Diabetes mellitus ([address] )     stable   • Disease of thyroid gland     hypothyroidism   • Diverticulosis    • Dizziness     at times   • Dysfunctional uterine bleeding     last assessed - [id number]   • Dysphagia    • Fibromyalgia    • Gastric ulcer    • Gastroparesis    • History of colonic polyps     last assessed - [id number]   • History of gastroesophageal reflux (GERD)    • Hypercholesterolemia    • Hyperlipidemia    • Hypertension    • IBS (irritable bowel syndrome)    • Pneumobilia 06/18/2022   • Post laminectomy syndrome    • S/P insertion of spinal cord stimulator 07/18/2018   • Seasonal allergies    • Shortness of breath     exertional   • Spinal stenosis    • Status post lumbar spinal fusion 03/16/2018   • Stroke [facility])     pt states slight stroke March 2022     Past Surgical History:   Procedure Laterality Date   • APPENDECTOMY     • BACK SURGERY     • BREAST CYST EXCISION Left    • CHOLECYSTECTOMY     • COLONOSCOPY     • ESOPHAGOGASTRODUODENOSCOPY N/A 09/28/2016    Procedure: ESOPHAGOGASTRODUODENOSCOPY (EGD); Surgeon: Delia Carmona MD;  [facility];   Service:    • HERNIA REPAIR     • HYSTERECTOMY      TTAH-BSO age 25   • LAMINECTOMY     • LUMBAR LAMINECTOMY     • OOPHORECTOMY      age 25   • WA ARTHRODESIS POSTERIOR/PSTLAT TQ 1NTRSP THORACIC N/A 06/04/2018    Procedure: Reopening of lumbar incision for T12-L5 posterior instrumented fixation and fusion and T12-L4 posterior decompression;  Surgeon: Amelie Mann MD;  [facility];  Service: Neurosurgery   • WA COLONOSCOPY FLX DX W/COLLJ SPEC WHEN PFRMD N/A 03/02/2016    Procedure: EGD AND COLONOSCOPY;  Surgeon: James Nuñez MD;  Location: [facility]; Service: Gastroenterology   • TN DILATION [address] [facility] UNGUIDED SOUND/BOUGIE 1/MULT PASS N/A 2016    Procedure: DILATATION ESOPHAGEAL;  Surgeon: James Nuñez MD;  [facility]: [facility]; Service: Gastroenterology   • TN ESOPHAGOGASTRODUODENOSCOPY TRANSORAL DIAGNOSTIC N/A 2016    Procedure: ESOPHAGOGASTRODUODENOSCOPY (EGD); Surgeon: James Nuñez MD;  [facility]: [facility];   Service: Gastroenterology   • TN INSJ/RPLCMT SPI NPGR DIR/INDUXIVE COUPLING Left 2018    Procedure: removal of left buttock implantable pulse generator and placement of new  implantable pulse generator;  Surgeon: Trenton Vazquez MD;  [facility];  Service: Neurosurgery     Social History   Social History     Substance and Sexual Activity   Alcohol Use Not Currently    Comment: Denied history of alcohol use     Social History     Substance and Sexual Activity   Drug Use No    Comment: Denied history of drug use     E-Cigarette/Vaping   • E-Cigarette Use Never User      E-Cigarette/Vaping Substances   • Nicotine No    • THC No    • CBD No    • Flavoring No    • Other No    • Unknown No      Social History     Tobacco Use   Smoking Status Former   • Types: Cigarettes   • Quit date: 1970   • Years since quittin 1   Smokeless Tobacco Never   Tobacco Comments    Denied history of current ever day smoker, Former smoker and Never smoker all documented in [employer]     Family History:   Family History   Problem Relation Age of Onset   • Lung cancer Mother 55   • Pulmonary embolism Father    • No Known Problems Sister    • No Known Problems Daughter    • No Known Problems Daughter    • Stroke Maternal Grandmother    • Heart attack Maternal Grandfather    • No Known Problems Paternal Grandmother    • No Known Problems Paternal Grandfather    • No Known Problems Maternal Aunt    • Diabetes Family         Diabetes mellitus   • Hypertension Family    • Stroke Family         Stroke complications     Meds/Allergies   all current active meds have been reviewed, current meds:   Current Facility-Administered Medications   Medication Dose Route Frequency   • oxyCODONE (ROXICODONE) immediate release tablet 10 mg  10 mg Oral Q6H PRN    And   • acetaminophen (TYLENOL) tablet 325 mg  325 mg Oral Q6H PRN   • acetaminophen (TYLENOL) tablet 650 mg  650 mg Oral Q6H PRN   • albuterol (PROVENTIL HFA,VENTOLIN HFA) inhaler 2 puff  2 puff Inhalation Q6H PRN   • aspirin chewable tablet 81 mg  81 mg Oral Daily   • atorvastatin (LIPITOR) tablet 40 mg  40 mg Oral Daily   • baclofen tablet 10 mg  10 mg Oral TID PRN   • gabapentin (NEURONTIN) capsule 300 mg  300 mg Oral Daily PRN   • heparin (porcine) subcutaneous injection 5,000 Units  5,000 Units Subcutaneous Q8H [address]   • insulin lispro (HumaLOG) 100 units/mL subcutaneous injection 1-5 Units  1-5 Units Subcutaneous HS   • insulin lispro (HumaLOG) 100 units/mL subcutaneous injection 1-6 Units  1-6 Units Subcutaneous TID AC   • latanoprost (XALATAN) 0 005 % ophthalmic solution 1 drop  1 drop Both Eyes HS   • levothyroxine tablet 37 5 mcg  37 5 mcg Oral Daily   • meclizine (ANTIVERT) tablet 25 mg  25 mg Oral 4x Daily PRN   • metoclopramide (REGLAN) tablet 10 mg  10 mg Oral 4x Daily PRN   • metoprolol tartrate (LOPRESSOR) partial tablet 12 5 mg  12 5 mg Oral Q12H ARY   • Milnacipran HCl TABS 100 mg  1 tablet Oral Daily   • nystatin (MYCOSTATIN) powder   Topical BID   • oxybutynin (DITROPAN) tablet 5 mg  5 mg Oral BID   • pantoprazole (PROTONIX) EC tablet 40 mg  40 mg Oral Q12H   • saccharomyces boulardii (FLORASTOR) capsule 250 mg  250 mg Oral BID   • zolpidem (AMBIEN) tablet 10 mg  10 mg Oral HS PRN    and PTA meds:   Prior to Admission Medications   Prescriptions Last Dose Informant Patient Reported? Taking? Blood Glucose Monitoring Suppl (OneTouch Verio Reflect) w/Device KIT   No No   Sig: Check blood sugars twice daily   Please substitute with appropriate alternative as covered by patient's insurance  Dx: E11 65   Milnacipran HCl (Savella) 100 MG TABS   No No   Sig: Take 1 tablet (100 mg total) by mouth daily   OneTouch Delica Lancets 63H MISC   No No   Sig: Check blood sugars twice daily  Please substitute with appropriate alternative as covered by patient's insurance  Dx: E11 65   albuterol (PROVENTIL HFA,VENTOLIN HFA) 90 mcg/act inhaler   No No   Sig: Inhale 2 puffs every 6 (six) hours as needed for wheezing or shortness of breath   aspirin 81 mg chewable tablet   No No   Sig: Chew 1 tablet (81 mg total) daily   atorvastatin (LIPITOR) 40 mg tablet   No No   Sig: TAKE ONE TABLET BY MOUTH ONCE DAILY   baclofen 10 mg tablet   No No   Sig: Take one tablet by mouth three times a day as needed for muscle spasms   chlorthalidone (HYGROTEN) 15 MG tablet   No No   Sig: Take 1 5 tablets (22 5 mg total) by mouth daily   cholestyramine (QUESTRAN) 4 g packet   No No   Sig: Take 1 packet (4 g total) by mouth 3 (three) times a day with meals   doxycycline hyclate (VIBRAMYCIN) 100 mg capsule   No No   Sig: Take 1 capsule (100 mg total) by mouth every 12 (twelve) hours for 10 days   escitalopram (Lexapro) 20 mg tablet   No No   Sig: Take 0 5 tablets (10 mg total) by mouth daily   gabapentin (NEURONTIN) 300 mg capsule   No No   Sig: Take 1 capsule (300 mg total) by mouth 3 (three) times a day   glucose blood (OneTouch Verio) test strip   No No   Sig: Check blood sugars twice daily  Please substitute with appropriate alternative as covered by patient's insurance   Dx: E11 65   hydrochlorothiazide (HYDRODIURIL) 12 5 mg tablet   No No   Sig: Take 2 tablets (25 mg total) by mouth daily as needed (edema/swelling)   latanoprost (XALATAN) 0 005 % ophthalmic solution   No No   Sig: Administer 1 drop to both eyes daily at bedtime   levothyroxine 25 mcg tablet   No No   Sig: Take 1 5 tablets (37 5 mcg total) by mouth daily   lisinopril (ZESTRIL) 20 mg tablet   No No   Sig: TAKE ONE TABLET BY MOUTH ONCE DAILY   meclizine (ANTIVERT) 25 mg tablet   No No   Sig: Take 1 tablet (25 mg total) by mouth 4 (four) times a day as needed for dizziness   metFORMIN (GLUCOPHAGE) 1000 MG tablet   No No   Sig: Take 1 tablet (1,000 mg total) by mouth 2 (two) times a day with meals   metoclopramide (REGLAN) 10 mg tablet   No No   Sig: Take 1 tablet (10 mg total) by mouth 4 (four) times a day as needed (Nausea and vomiting)   metoprolol tartrate (LOPRESSOR) 25 mg tablet   No No   Sig: Take 0 5 tablets (12 5 mg total) by mouth every 12 (twelve) hours   nystatin (MYCOSTATIN) powder   No No   Sig: Apply topically 2 (two) times a day   oxyCODONE-acetaminophen (PERCOCET)  mg per tablet   No No   Sig: Take 1 tablet by mouth every 6 (six) hours as needed for severe pain Max Daily Amount: 4 tablets   pantoprazole (PROTONIX) 40 mg tablet   No No   Sig: Take 1 tablet (40 mg total) by mouth every 12 (twelve) hours   prednisoLONE (ORAPRED) 15 mg/5 mL oral solution   No No   Sig: 3 tsp twice/day for 2 days, 2 tsp twice/day for 2 days, 1 tsp twice/day x2 days than 1/2 tsp daily x 2 days   saccharomyces boulardii (FLORASTOR) 250 mg capsule   No No   Sig: Take 1 capsule (250 mg total) by mouth 2 (two) times a day   trospium chloride (SANCTURA) 20 mg tablet   No No   Sig: Take 1 tablet (20 mg total) by mouth 2 (two) times a day   zolpidem (AMBIEN) 10 mg tablet   No No   Sig: Take 1 tablet (10 mg total) by mouth daily at bedtime as needed for sleep      Facility-Administered Medications: None     Allergies   Allergen Reactions   • Penicillins Anaphylaxis, Hives and Other (See Comments)     Other reaction(s): Unknown Reaction   • Sulfa Antibiotics Anaphylaxis and Other (See Comments)     Other reaction(s): Unknown Reaction   • Aspartame - Food Allergy Other (See Comments) and Hypertension     Slurred speech, weakness, stroke sx   • Iodinated Contrast Media Hives     Pt has taken prep prior for contrast and has not had any break through reaction   • Keflex [Cephalexin] Hives       Review of previous medical records was completed  Review of Systems   Constitutional: Positive for chills and fatigue  Negative for activity change, diaphoresis and fever  HENT: Negative for congestion, ear pain, rhinorrhea, sore throat and trouble swallowing  Eyes: Negative for pain and visual disturbance  Respiratory: Negative for cough and shortness of breath  Cardiovascular: Negative for chest pain and palpitations  Gastrointestinal: Negative for abdominal pain and vomiting  Genitourinary: Positive for urgency  Negative for difficulty urinating, dysuria, hematuria and vaginal discharge  Musculoskeletal: Positive for back pain, gait problem, myalgias and neck pain  Negative for arthralgias  Skin: Negative for color change and rash  Neurological: Positive for tremors, speech difficulty (slurred speech intermittently past few days per patient son) and weakness  Negative for dizziness, seizures, syncope, facial asymmetry, light-headedness and headaches  Psychiatric/Behavioral: Positive for confusion and sleep disturbance  Negative for decreased concentration, dysphoric mood, hallucinations and suicidal ideas  The patient is not nervous/anxious  All other systems reviewed and are negative  OBJECTIVE     Patient ID: Gena Eid is a 76 y o  female  Vitals:   Vitals:    23 2150 23 2152 23 0600 23 0710   BP: 167/84 167/84  109/71   BP Location:       Pulse: 90 91  84   Resp:    16   Temp:  98 8 °F (37 1 °C)  99 2 °F (37 3 °C)   TempSrc:       SpO2:  96%  95%   Weight:   85 4 kg (188 lb 4 4 oz)    Height:          Body mass index is 36 77 kg/m²  No intake or output data in the 24 hours ending 23 1100    Temperature:   Temp (24hrs), Av 5 °F (36 9 °C), Min:97 9 °F (36 6 °C), Max:99 2 °F (37 3 °C)    Temperature: 99 2 °F (37 3 °C)    Invasive Devices:    Invasive Devices     Peripheral Intravenous Line Duration           Peripheral IV 02/23/23 Left Antecubital <1 day    Peripheral IV 02/24/23 Right Antecubital <1 day                Physical Exam  Vitals and nursing note reviewed  Constitutional:       General: She is not in acute distress  Appearance: Normal appearance  She is well-developed  She is obese  She is not ill-appearing, toxic-appearing or diaphoretic  HENT:      Head: Normocephalic and atraumatic  Eyes:      Conjunctiva/sclera: Conjunctivae normal    Cardiovascular:      Rate and Rhythm: Normal rate and regular rhythm  Pulmonary:      Effort: Pulmonary effort is normal  No respiratory distress  Breath sounds: Normal breath sounds  Abdominal:      General: There is no distension  Palpations: Abdomen is soft  Tenderness: There is no abdominal tenderness  Musculoskeletal:         General: No swelling or tenderness  Cervical back: Neck supple  Right lower leg: Edema present  Left lower leg: Edema present  Skin:     General: Skin is warm and dry  Capillary Refill: Capillary refill takes less than 2 seconds  Coloration: Skin is not jaundiced or pale  Findings: No lesion or rash  Neurological:      Mental Status: She is alert and oriented to person, place, and time  Cranial Nerves: Cranial nerves 2-12 are intact  Coordination: Finger-Nose-Finger Test normal       Deep Tendon Reflexes:      Reflex Scores:       Tricep reflexes are 2+ on the right side and 2+ on the left side  Bicep reflexes are 2+ on the right side and 2+ on the left side  Brachioradialis reflexes are 2+ on the right side and 2+ on the left side  Patellar reflexes are 2+ on the right side and 2+ on the left side  Achilles reflexes are 2+ on the right side and 2+ on the left side    Psychiatric:         Mood and Affect: Mood normal          Speech: Speech normal          Behavior: Behavior normal           Neurologic Exam     Mental Status Oriented to person, place, and time  Oriented to person  Oriented to place  Oriented to year, month and date  Registration: recalls 3 of 3 objects  Recall at 5 minutes: recalls 2 of 3 objects  Follows 2 step commands  Attention: normal  Concentration: normal    Speech: speech is normal   Level of consciousness: alert  Knowledge: good  Able to name object  Cranial Nerves   Cranial nerves II through XII intact  Motor Exam   Muscle bulk: normal  Overall muscle tone: normal    Strength   Right deltoid: 5/5  Left deltoid: 5/5  Right biceps: 5/5  Left biceps: 5/5  Right triceps: 5/5  Left triceps: 5/5  Right abdominals: 5/5  Left abdominals: 5/5  Right iliopsoas: 5/5  Left iliopsoas: 5/5  Right quadriceps: 5/5  Left quadriceps: 5/5  Right hamstrin/5  Left hamstrin/5  Right anterior tibial: 5/5  Left anterior tibial: 5/5  Right posterior tibial: 5/5  Left posterior tibial: 5/5  Right gastroc: 5/5  Left gastroc: 5/5    Sensory Exam   Light touch normal    Proprioception normal      Gait, Coordination, and Reflexes     Coordination   Finger to nose coordination: normal    Reflexes   Right brachioradialis: 2+  Left brachioradialis: 2+  Right biceps: 2+  Left biceps: 2+  Right triceps: 2+  Left triceps: 2+  Right patellar: 2+  Left patellar: 2+  Right achilles: 2+  Left achilles: 2+         LABORATORY DATA     Labs: I have personally reviewed pertinent reports      Results from last 7 days   Lab Units 23  1533 23  0958   WBC Thousand/uL 6 14 6 83 5 90   HEMOGLOBIN g/dL 9 6* 10 0* 10 6*   HEMATOCRIT % 30 7* 32 8* 35 5   PLATELETS Thousands/uL 213 222 235   NEUTROS PCT %  --  61 62   MONOS PCT %  --  11 9      Results from last 7 days   Lab Units 23  1533 23  0958   POTASSIUM mmol/L 5 5* 5 0 4 3   CHLORIDE mmol/L 104 103 109*   CO2 mmol/L 27 28 25   BUN mg/dL 35* 40* 31*   CREATININE mg/dL 1 28 1 68* 1 62*   CALCIUM mg/dL 9 0 9 7 9 6   ALK PHOS U/L  --  89  --    ALT U/L  --  9  --    AST U/L  --  16  --      Results from last 7 days   Lab Units 02/23/23  1533 02/20/23  0958   MAGNESIUM mg/dL 2 3 2 0          Results from last 7 days   Lab Units 02/23/23  1533   INR  0 93   PTT seconds 30     Results from last 7 days   Lab Units 02/23/23  1533   LACTIC ACID mmol/L 1 3           IMAGING & DIAGNOSTIC TESTING     Radiology Results: I have personally reviewed pertinent films in PACS  XR chest 1 view portable   Final Result by Pamella Cornejo MD (02/23 1536)      No acute cardiopulmonary disease                    Workstation performed: [id number]         CT head without contrast   ED Interpretation by Dorie Landry PA-C (02/23 1635)   CT head without contrast  Status: Final result    PACS Images     Show images for CT head without contrast  Study Result    Wet Read  CT head without contrast   Status: Final result     PACS Images      Show images for CT head without contrast   Study Result     Narrative & Impression   CT BRAIN - WITHOUT CONTRAST       INDICATION:   ams        COMPARISON:  7/25/2022       TECHNIQUE:  CT examination of the brain was performed   In addition to axial images, sagittal and coronal 2D reformatted images were created and submitted for interpretation        Radiation dose length product (DLP) for this visit: 9147 0918 mGy-cm    This examination, like all CT scans performed in the [facility], was performed utilizing techniques to minimize radiation dose exposure, including the use of iterative   reconstruction and automated exposure control          IMAGE QUALITY:  Diagnostic        FINDINGS:       PARENCHYMA: Decreased attenuation is noted in periventricular and subcortical white    matter demonstrating an appearance that is statistically most likely to represent mild microangiopathic change        No CT signs of acute infarction   No   intracranial mass, mass effect or midline shift   No acute parenchymal hemorrhage        VENTRICLES AND EXTRA-AXIAL SPACES:  Normal for the patient's age        VISUALIZED ORBITS: Normal visualized orbits        PARANASAL SINUSES: Normal visualized paranasal sinuses        CALVARIUM AND EXTRACRANIAL SOFT TISSUES:  Normal        IMPRESSION:       No acute intracranial abnormality                        Workstation performed: [id number]         Imaging     CT head without contrast (Order: [id number]) - 2/23/2023     Result History     CT head without contrast (Order #[id number]) on 2/23/2023 - Order Result History Report     Order Report     Order Details   Order Questions     Question Answer   What is the patient's sedation requirement? No Sedation   Release to patient through Mychart Immediate   Exam reason ams   Note: Enter reason    for exam   Decision Support Exception Emergency Medical Condition (MA)           Result Information     Status Priority Source   Final result (2/23/2023 1555) STAT     Other Results from 2/23/20 23     UA w Reflex to Microscopic w Reflex to Culture  In process 2/23/2023   Blood culture #2  In process 2/23/2023   CBC and differential  Final result 2/23/2023   Protime-INR  In process 2/23/2023   APTT  In process 2/23/2023   Blood culture #1  In process 2/23/2023   Comprehensive metabolic panel  Final result 2/23/2023   Lactic acid  Final result 2/23/2023   HS Troponin 0hr (reflex protocol)  Final result 2/23/2023   TSH  Final result 2/23/2023   Magnesium  Final result 2/23/2023    important suggestion  Warning: Additional results from 2/23/2023 are available but are not displayed in this report         Reason for Exam     ams         CT head without contrast: Patient Communication     Add Comments  Not seen         Signed by     Signed Date/Time Phone Pager   Sue Hutchins 2/23/20 23 15:55 [phone]       Exam Information     Status Exam Begun Exam Ended Performing Tech   Final [99] 2/23/2023 14:56 2/23/2023 15:08 8 Anjali Mccarty       Screening Form Questions     No questions ha   ve been answered for this form  External Results Report       Open External Results Report     Encounter       View Encounter                   Patient Care Timeline     No data selected in time range     Pre-op Summary       Pre-op             Recovery Summary       Recovery               Routing History     None        Interpreted by Dora Gonzales PA-C on 2/23/2023  4:35 PM  Narrative & Impression  CT BRAIN - WITHOUT CONTRAST     INDICATION:   ams      COMPARISON:  7/25/2022     TECHNIQUE:  CT examination of the brain was performed  In addition to axial images, sagittal and coronal 2D reformatted images were created and submitted for interpretation      Radiation dose length product (DLP) for this visit:  972 mGy-cm   This examination, like all CT scans perform   ed in the [facility], was performed utilizing techniques to minimize radiation dose exposure, including the use of iterative   reconstruction and automated exposure control        IMAGE QUALITY:  Diagnostic      FINDINGS:     PARENCHYMA: Decreased attenuation is noted in periventricular and subcortical white matter demonstrating an appearance that is statistically most likely to represent mild microangiopathic change      No CT signs of acute infarction  No intracranial mass, mass effect or midline shift    No acute parenchymal hemorrhage      VENTRICLES AND EXTRA-AXIAL SPACES:  Normal for the patient's age      VISUALIZED ORBITS: Normal visualized orbits      PARANASAL SINUSES: Normal visualized paranasal sinuses      CALVARIUM AND EXTRACRANIAL SOFT TISSUES:  Normal      IMPRESSION:     No acute intracranial abnormality                  Workstation performed: [id number]       Imaging    CT head without contrast (Order: [id number]) - 2/23/2023    Result History    CT he   ad without contrast (Order #[id number]) on 2/23/2023 - Order Result History Report    Order Report    Order Details  Order Questions    Question Answer  What is the patient's sedation requirement? No Sedation  Release to patient through Mychart Immediate  Exam reason ams  Note: Enter reason for exam  Decision Support Exception Emergency Medical Condition (MA)         Result Information    Status Priority Source  Final result (2/23/2023 1634) STAT     Other Results from 2/23/2023     UA w Reflex to Microscopic w Reflex to Culture  In process 2/23/2023   Blood culture #2  In process 2/23/2023   CBC and differential  Final result 2/23/2023   Protime-INR  In process 2/23/2023   APTT  In process 2/23/2023   Blood culture #1  In process 2/23/2023   Comprehensive metabolic panel  Final result 2/23/2023   Lactic acid  Final result 2/23/2023   HS Troponin 0hr (reflex protocol)  Final result 2/23/2023   TSH  Final result 2/23/2023   Magnesium  Final result 2/23/2023   important suggestion  Sherry salgado: Additional results from 2/23/2023 are available but are not displayed in this report  Reason for Exam    ams        CT head without contrast: Patient Communication    Add Comments  Not seen        Signed by    Signed Date/Time  Phone Pager  Sue Liset 2/23/2023 15:55 [phone]       Exam Information    Status Exam Begun  Exam Ended  Performing Tech  Final [99] 2/23/2023 14:56 2/23/2023 15:08 8 Rudavid Mccarty      Screening Form Questions    No questions have been answered for this form  External Results Report      Open External Results Report     Encounter      View Encounter                 Patient Care Timeline    No data selected in time range    Pre-op Summary      Pre-op            Recovery Summary      Recovery             Routing History    None            Final Result by Meggan Estevez MD (02/23 1781)      No acute intracranial abnormality                    Workstation performed: [id number]         VAS lower limb venous duplex study, complete bilateral    (Results Pending)       Other Diagnostic Testing: I have personally reviewed pertinent reports  ACTIVE MEDICATIONS     Current Facility-Administered Medications   Medication Dose Route Frequency   • oxyCODONE (ROXICODONE) immediate release tablet 10 mg  10 mg Oral Q6H PRN    And   • acetaminophen (TYLENOL) tablet 325 mg  325 mg Oral Q6H PRN   • acetaminophen (TYLENOL) tablet 650 mg  650 mg Oral Q6H PRN   • albuterol (PROVENTIL HFA,VENTOLIN HFA) inhaler 2 puff  2 puff Inhalation Q6H PRN   • aspirin chewable tablet 81 mg  81 mg Oral Daily   • atorvastatin (LIPITOR) tablet 40 mg  40 mg Oral Daily   • baclofen tablet 10 mg  10 mg Oral TID PRN   • gabapentin (NEURONTIN) capsule 300 mg  300 mg Oral Daily PRN   • heparin (porcine) subcutaneous injection 5,000 Units  5,000 Units Subcutaneous Q8H [facility]   • insulin lispro (HumaLOG) 100 units/mL subcutaneous injection 1-5 Units  1-5 Units Subcutaneous HS   • insulin lispro (HumaLOG) 100 units/mL subcutaneous injection 1-6 Units  1-6 Units Subcutaneous TID AC   • latanoprost (XALATAN) 0 005 % ophthalmic solution 1 drop  1 drop Both Eyes HS   • levothyroxine tablet 37 5 mcg  37 5 mcg Oral Daily   • meclizine (ANTIVERT) tablet 25 mg  25 mg Oral 4x Daily PRN   • metoclopramide (REGLAN) tablet 10 mg  10 mg Oral 4x Daily PRN   • metoprolol tartrate (LOPRESSOR) partial tablet 12 5 mg  12 5 mg Oral Q12H ARY   • Milnacipran HCl TABS 100 mg  1 tablet Oral Daily   • nystatin (MYCOSTATIN) powder   Topical BID   • oxybutynin (DITROPAN) tablet 5 mg  5 mg Oral BID   • pantoprazole (PROTONIX) EC tablet 40 mg  40 mg Oral Q12H   • saccharomyces boulardii (FLORASTOR) capsule 250 mg  250 mg Oral BID   • zolpidem (AMBIEN) tablet 10 mg  10 mg Oral HS PRN       Prior to Admission medications    Medication Sig Start Date End Date Taking?  Authorizing Provider   doxycycline hyclate (VIBRAMYCIN) 100 mg capsule Take 1 capsule (100 mg total) by mouth every 12 (twelve) hours for 10 days 2/22/23 3/4/23  NANY Gale   gabapentin (NEURONTIN) 300 mg capsule Take 1 capsule (300 mg total) by mouth 3 (three) times a day 10/24/22   Marika Ou, CRNP   metoprolol tartrate (LOPRESSOR) 25 mg tablet Take 0 5 tablets (12 5 mg total) by mouth every 12 (twelve) hours 1/4/23 Stephani Ou, CRNP   albuterol (PROVENTIL HFA,VENTOLIN HFA) 90 mcg/act inhaler Inhale 2 puffs every 6 (six) hours as needed for wheezing or shortness of breath 6/29/22 Stephani Ou, CRNP   aspirin 81 mg chewable tablet Chew 1 tablet (81 mg total) daily 8/9/22 10/13/22  Marika Ou, CRNP   atorvastatin (LIPITOR) 40 mg tablet TAKE ONE TABLET BY MOUTH ONCE DAILY 11/22/22 Stephani Ou, CRNP   baclofen 10 mg tablet Take one tablet by mouth three times a day as needed for muscle spasms 8/9/22 Stephani Ou, CRNP   Blood Glucose Monitoring Suppl (OneTouch Verio Reflect) w/Device KIT Check blood sugars twice daily  Please substitute with appropriate alternative as covered by patient's insurance  Dx: E11 65 2/20/23 Stephani Ou, CRNP   chlorthalidone (HYGROTEN) 15 MG tablet Take 1 5 tablets (22 5 mg total) by mouth daily 2/17/23 Stephani Ou, CRNP   cholestyramine Phyliss ) 4 g packet Take 1 packet (4 g total) by mouth 3 (three) times a day with meals 10/27/22   Joselito Baires,    escitalopram (Lexapro) 20 mg tablet Take 0 5 tablets (10 mg total) by mouth daily 6/29/22 Stephani Ou, CRNP   glucose blood (OneTouch Verio) test strip Check blood sugars twice daily  Please substitute with appropriate alternative as covered by patient's insurance   Dx: E11 65 2/20/23 Stephani Ou, CRNP   hydrochlorothiazide (HYDRODIURIL) 12 5 mg tablet Take 2 tablets (25 mg total) by mouth daily as needed (edema/swelling) 1/24/23   Marika Ou, CRNP   latanoprost (XALATAN) 0 005 % ophthalmic solution Administer 1 drop to both eyes daily at bedtime 10/2/20 6/29/22  Joselito Baires,    levothyroxine 25 mcg tablet Take 1 5 tablets (37 5 mcg total) by mouth daily 8/9/22 10/13/22  NANY Live   lisinopril (ZESTRIL) 20 mg tablet TAKE ONE TABLET BY MOUTH ONCE DAILY 2/16/23   NANY Live   meclizine (ANTIVERT) 25 mg tablet Take 1 tablet (25 mg total) by mouth 4 (four) times a day as needed for dizziness 6/21/22 10/13/22  Ramy Moscoso MD   metFORMIN (GLUCOPHAGE) 1000 MG tablet Take 1 tablet (1,000 mg total) by mouth 2 (two) times a day with meals 2/10/23   NANY Live   metoclopramide (REGLAN) 10 mg tablet Take 1 tablet (10 mg total) by mouth 4 (four) times a day as needed (Nausea and vomiting) 9/9/22   NANY Live   Milnacipran HCl (Savella) 100 MG TABS Take 1 tablet (100 mg total) by mouth daily 9/13/21   Carlos Baires DO   nystatin (MYCOSTATIN) powder Apply topically 2 (two) times a day 6/21/22   Ramy Moscoso MD   OneTouch Delica Lancets 42O MISC Check blood sugars twice daily  Please substitute with appropriate alternative as covered by patient's insurance   Dx: E11 65 2/20/23   NANY Live   oxyCODONE-acetaminophen (PERCOCET)  mg per tablet Take 1 tablet by mouth every 6 (six) hours as needed for severe pain Max Daily Amount: 4 tablets 1/24/23   NANY Live   pantoprazole (PROTONIX) 40 mg tablet Take 1 tablet (40 mg total) by mouth every 12 (twelve) hours 7/18/22 10/16/22  Reese Peña MD   prednisoLONE (ORAPRED) 15 mg/5 mL oral solution 3 tsp twice/day for 2 days, 2 tsp twice/day for 2 days, 1 tsp twice/day x2 days than 1/2 tsp daily x 2 days 12/22/22   NANY Live   saccharomyces boulardii (FLORASTOR) 250 mg capsule Take 1 capsule (250 mg total) by mouth 2 (two) times a day 6/21/22   Ramy Moscoso MD   trospium chloride (SANCTURA) 20 mg tablet Take 1 tablet (20 mg total) by mouth 2 (two) times a day 9/28/22   NANY Live   zolpidem (AMBIEN) 10 mg tablet Take 1 tablet (10 mg total) by mouth daily at bedtime as needed for sleep 1/18/23   NANY Live   sucralfate (CARAFATE) 1 g tablet Take 1 tablet (1 g total) by mouth 4 (four) times a day 7/18/22 10/13/22  Lew Ventura MD         CODE STATUS & ADVANCED DIRECTIVES     Code Status: Level 1 - Full Code  Advance Directive and Living Will:      Power of :    POLST:        VTE Pharmacologic Prophylaxis: Heparin  VTE Mechanical Prophylaxis: sequential compression device    ==  DO Sampson Vincent [facility] Psychiatry Residency, PGY-1

## 2023-02-24 NOTE — ASSESSMENT & PLAN NOTE
· According to the patient's daughter, who is one of our [place] [address] primary care physicians, patient had been having occasional confusion, body shakes and blank stare  · When I saw the patient, patient was completely coherent, oriented x3, and conversant  No confusion at the time  Neurological examination was essentially normal   · CT scan of the head: No acute findings  · History of stroke in the past   · Questionable delirium versus possibility of a seizure  Possible due to dehydration as patient is clinically dry and had been having diarrhea this past several days and confusion had resolved at this point with IV fluid hydration  · Neurology consult  · Monitor mental status  · With polypharmacy, I also consulted geriatric physician  As per discussion with patient's daughter, patient takes oxycodone and gabapentin on as-needed basis for her back pains  She told me, that her mom only usually takes once a day of these medications  Patient also on muscle relaxant as needed  Initial medication list included both Lexapro and Timbo Siskin, which is an SSRI and an SNRI and thus this was discussed with the patient's daughter and was told that patient is not on Lexapro anymore  Patient is also on [place] and Edis which is an anticholinergic medication and can cause confusion  Patient's medications may need to be reviewed extensively and possibly be updated

## 2023-02-24 NOTE — ASSESSMENT & PLAN NOTE
Lab Results   Component Value Date    HGBA1C 7 7 (H) 02/20/2023     Recent Labs     02/24/23  0707 02/24/23  1138   POCGLU 117 133       Blood Sugar Average: Last 72 hrs:  · Hold off patient's metformin while in the hospital   · Insulin sliding scale  · Accu-Cheks  · Hypoglycemia protocol  · Adjust treatment accordingly    (P) 125

## 2023-02-24 NOTE — ASSESSMENT & PLAN NOTE
· Patient's baseline creatinine is 0 99, consistent with CKD 3A  Presented with SADAF with creatinine 1 6  · Likely prerenal with patient having diarrhea for several days; seems to be self-limited  · Continue IV fluids but reduce rate  · Monitor BMP in a m  · Avoid nephrotoxins  Avoid hypotension  · Hold lisinopril  · Hold off chlorthalidone that was just prescribed recently

## 2023-02-24 NOTE — ASSESSMENT & PLAN NOTE
Patient is a 76year old female with past medical history of anemia, opiate dependence disorder, DM II, IBS, recurrent UTI, multiple previous falls within the past few years, chronic pain s/p spinal cord stimulator which is non-MRI compatible, and previous stroke in April of 2022 who presents with new onset of confusion and body chills/shaking for the past few days  Treated for UTI with Abx 3 weeks ago, finished course as prescribed  Noticed foul smelling urine again a few days ago, UA in [facility] office on 02/21/2023 negative for acute UTI but prescribed doxycycline  Patient has had new episodes of intermittent confusion, slurred speech, and body chills/shaking for 2-3 days with a few episodes of watery diarrhea that resolved two days ago with Questran administration  In the ED: CT head w/o contrast was negative for acute abnormalities  Patient not eligible for MRI due to non-compatible spinal cord stimulator  Infectious work-up pending, patient has previous history of multiple falls, and concerns for polypharmacy given patient's age, Gerontology consulted, OT cognitive evaluation ordered  On exam, patient is HEALY x3, MSE is age appropriate, sensory, motor and reflexes intact BL, overall benign exam, patient is at baseline with no focal deficits  Plan:  MRI contraindicated at this time  AMS more likely from infectious/metabolic/delirium etiology rather than new CVA/seizure  Continue infectious/metabolic work up per [place]  Neurology will sign off at this time

## 2023-02-24 NOTE — PROGRESS NOTES
[address] [facility]  Progress Note - Starlet Mode 1945, 76 y o  female MRN: [id number]  Unit/Bed#: W -01 Encounter: [id number]  Primary Care Provider: NANY Stevens   Date and time admitted to hospital: 2/23/2023  2:08 PM    * SADAF (acute kidney injury) [facility])  Assessment & Plan  · Patient's baseline creatinine is 0 99, consistent with CKD 3A  Presented with SADAF with creatinine 1 6  · Likely prerenal with patient having diarrhea for several days; seems to be self-limited  · Continue IV fluids but reduce rate  · Monitor BMP in a m  · Avoid nephrotoxins  Avoid hypotension  · Hold lisinopril  · Hold off chlorthalidone that was just prescribed recently  Diarrhea  Assessment & Plan  · Patient had been having diarrhea since last Sunday  She took a dose of Questran which she has at home for irritable bowel syndrome and has had no additional events  · Patient had recent course of antibiotic treatment for UTI approximately 3 weeks ago  · Stools for bacterial enteric PCR as well as C  difficile PCR/EIA were requested but not yet sent due to lack of diarrhea      Bilateral lower extremity edema  Assessment & Plan  · Patient has history of chronic bilateral lower extremity edema, but had been worsening lately  According to the patient's daughter, who is a doctor, no history of congestive heart failure  · Lower extremity Doppler negative for DVT  · Get updated echocardiogram    Confusion with body shakes and blank stare  Assessment & Plan  · According to the patient's daughter, who is one of our [facility] primary care physicians, patient had been having occasional confusion, body shakes and blank stare  · Neurology consult noted  They felt this is more likely encephalopathy rather than seizure  Unable to do MRI due to spinal stimulator  · Geriatrics consulted due to polypharmacy    Need to attempt to wean down/limit mind altering medications as much as feasible    Type 2 diabetes mellitus [facility])  Assessment & Plan  Lab Results   Component Value Date    HGBA1C 7 7 (H) 2023     Recent Labs     23  0707 23  1138   POCGLU 117 133       Blood Sugar Average: Last 72 hrs:  · Hold off patient's metformin while in the hospital   · Insulin sliding scale  · Accu-Cheks  · Hypoglycemia protocol  · Adjust treatment accordingly  (P) 125      VTE Pharmacologic Prophylaxis: VTE Score: 4 subcu heparin    Patient Centered Rounds: I performed bedside rounds with nursing staff today  Discussions with Specialists or Other Care Team Provider:     Education and Discussions with Family / Patient: Attempted to update  (daughter) via phone  Left voicemail  Total Time Spent on Date of Encounter in care of patient: 35 minutes This time was spent on one or more of the following: performing physical exam; counseling and coordination of care; obtaining or reviewing history; documenting in the medical record; reviewing/ordering tests, medications or procedures; communicating with other healthcare professionals and discussing with patient's family/caregivers  Current Length of Stay: 1 day(s)  Current Patient Status: Inpatient   Certification Statement: The patient will continue to require additional inpatient hospital stay due to Ongoing generalized weakness  Discharge Plan: Anticipate discharge in 24-48 hrs to discharge location to be determined pending rehab evaluations  Code Status: Level 1 - Full Code    Subjective:   Patient told me that she feels about the same today  She is not having any diarrhea  She feels weak  Still has leg swelling  otherwise offers limited history      Objective:     Vitals:   Temp (24hrs), Av 5 °F (36 9 °C), Min:97 9 °F (36 6 °C), Max:99 2 °F (37 3 °C)    Temp:  [97 9 °F (36 6 °C)-99 2 °F (37 3 °C)] 99 2 °F (37 3 °C)  HR:  [56-91] 84  Resp:  [16-18] 16  BP: (109-174)/(58-91) 109/71  SpO2:  [93 %-99 %] 95 %  Body mass index is 36 77 kg/m²  Input and Output Summary (last 24 hours):   No intake or output data in the 24 hours ending 02/24/23 1253    Physical Exam:   Physical Exam  Vitals reviewed  Constitutional:       General: She is not in acute distress  Appearance: She is obese  She is not ill-appearing, toxic-appearing or diaphoretic  HENT:      Nose: No congestion  Eyes:      General: No scleral icterus  Right eye: No discharge  Left eye: No discharge  Conjunctiva/sclera: Conjunctivae normal    Cardiovascular:      Rate and Rhythm: Normal rate and regular rhythm  Heart sounds: No murmur heard  Pulmonary:      Effort: No respiratory distress  Breath sounds: No stridor  No wheezing or rhonchi  Comments: Bibasilar crackles noted  No dyspnea or tachypnea noted  Not requiring oxygen  Abdominal:      General: There is no distension  Palpations: Abdomen is soft  Tenderness: There is no abdominal tenderness  There is no guarding  Musculoskeletal:      Right lower leg: Edema present  Left lower leg: Edema present  Comments: Significant bilateral lower extremity edema noted   Skin:     General: Skin is warm and dry  Coloration: Skin is not jaundiced or pale  Findings: No bruising, erythema, lesion or rash  Neurological:      Mental Status: She is alert  Comments: Awake alert interactive, no overt confusion    No abnormal movements   Psychiatric:      Comments: Flat affect          Additional Data:     Labs:  Results from last 7 days   Lab Units 02/24/23  1211 02/24/23  0459 02/23/23  1533   WBC Thousand/uL  --  6 14 6 83   HEMOGLOBIN g/dL  --  9 6* 10 0*   HEMATOCRIT %  --  30 7* 32 8*   PLATELETS Thousands/uL 208 213 222   NEUTROS PCT %  --   --  61   LYMPHS PCT %  --   --  23   MONOS PCT %  --   --  11   EOS PCT %  --   --  4     Results from last 7 days   Lab Units 02/24/23  1211 02/24/23  0459 02/23/23  1533   SODIUM mmol/L 138   < > 138   POTASSIUM mmol/L 5 3   < > 5 0   CHLORIDE mmol/L 104   < > 103   CO2 mmol/L 29   < > 28   BUN mg/dL 31*   < > 40*   CREATININE mg/dL 1 15   < > 1 68*   ANION GAP mmol/L 5   < > 7   CALCIUM mg/dL 9 2   < > 9 7   ALBUMIN g/dL  --   --  3 7   TOTAL BILIRUBIN mg/dL  --   --  0 39   ALK PHOS U/L  --   --  89   ALT U/L  --   --  9   AST U/L  --   --  16   GLUCOSE RANDOM mg/dL 119   < > 99    < > = values in this interval not displayed  Results from last 7 days   Lab Units 02/23/23  1533   INR  0 93     Results from last 7 days   Lab Units 02/24/23  1138 02/24/23  0707   POC GLUCOSE mg/dl 133 117     Results from last 7 days   Lab Units 02/20/23  0958   HEMOGLOBIN A1C % 7 7*     Results from last 7 days   Lab Units 02/23/23  1533   LACTIC ACID mmol/L 1 3       Lines/Drains:  Invasive Devices     Peripheral Intravenous Line  Duration           Peripheral IV 02/23/23 Left Antecubital <1 day    Peripheral IV 02/24/23 Right Antecubital <1 day              Imaging: CXR    Recent Cultures (last 7 days):   Results from last 7 days   Lab Units 02/23/23  1548 02/23/23  1533   BLOOD CULTURE  Received in Microbiology Lab  Culture in Progress  Received in Microbiology Lab  Culture in Progress         Last 24 Hours Medication List:   Current Facility-Administered Medications   Medication Dose Route Frequency Provider Last Rate   • oxyCODONE  10 mg Oral Q6H PRN Radha Cooper PA-C      And   • acetaminophen  325 mg Oral Q6H PRN Radha Cooper PA-C     • acetaminophen  650 mg Oral Q6H PRN Randy Garcia MD     • albuterol  2 puff Inhalation Q6H PRN Randy Garcia MD     • aspirin  81 mg Oral Daily Randy Garcia MD     • atorvastatin  40 mg Oral Daily Randy Garcia MD     • baclofen  10 mg Oral TID PRN Torin Handy MD     • gabapentin  300 mg Oral Daily PRN Randy Garcia MD     • heparin (porcine)  5,000 Units Subcutaneous [facility] Radha Echeverria PA-C • insulin lispro  1-5 Units Subcutaneous HS Randy Handy MD     • insulin lispro  1-6 Units Subcutaneous TID AC Randy Handy MD     • latanoprost  1 drop Both Eyes HS Faye Handy MD     • levothyroxine  37 5 mcg Oral Daily Randy Otto MD     • meclizine  25 mg Oral 4x Daily PRN Randy Otto MD     • metoclopramide  10 mg Oral 4x Daily PRN Randy Otto MD     • metoprolol tartrate  12 5 mg Oral Q12H Pj Handy MD     • Milnacipran HCl  1 tablet Oral Daily Faye Handy MD     • nystatin   Topical BID Faye Handy MD     • oxybutynin  5 mg Oral BID Faye Handy MD     • pantoprazole  40 mg Oral Q12H Faye Handy MD     • saccharomyces boulardii  250 mg Oral BID Faye Handy MD     • zolpidem  10 mg Oral HS PRN Melina Louis MD          Today, Patient Was Seen By: Pernell Son PA-C    **Please Note: This note may have been constructed using a voice recognition system  **

## 2023-02-25 PROBLEM — N17.9 AKI (ACUTE KIDNEY INJURY) (HCC): Status: RESOLVED | Noted: 2022-06-18 | Resolved: 2023-02-25

## 2023-02-25 LAB
ANION GAP SERPL CALCULATED.3IONS-SCNC: 5 MMOL/L (ref 4–13)
BASOPHILS # BLD AUTO: 0.04 THOUSANDS/ÂΜL (ref 0–0.1)
BASOPHILS NFR BLD AUTO: 1 % (ref 0–1)
BUN SERPL-MCNC: 24 MG/DL (ref 5–25)
CALCIUM SERPL-MCNC: 9.1 MG/DL (ref 8.4–10.2)
CHLORIDE SERPL-SCNC: 105 MMOL/L (ref 96–108)
CO2 SERPL-SCNC: 27 MMOL/L (ref 21–32)
CREAT SERPL-MCNC: 1 MG/DL (ref 0.6–1.3)
EOSINOPHIL # BLD AUTO: 0.18 THOUSAND/ÂΜL (ref 0–0.61)
EOSINOPHIL NFR BLD AUTO: 4 % (ref 0–6)
ERYTHROCYTE [DISTWIDTH] IN BLOOD BY AUTOMATED COUNT: 13.5 % (ref 11.6–15.1)
GFR SERPL CREATININE-BSD FRML MDRD: 53 ML/MIN/1.73SQ M
GLUCOSE SERPL-MCNC: 130 MG/DL (ref 65–140)
GLUCOSE SERPL-MCNC: 135 MG/DL (ref 65–140)
GLUCOSE SERPL-MCNC: 209 MG/DL (ref 65–140)
GLUCOSE SERPL-MCNC: 226 MG/DL (ref 65–140)
GLUCOSE SERPL-MCNC: 254 MG/DL (ref 65–140)
HCT VFR BLD AUTO: 31.2 % (ref 34.8–46.1)
HGB BLD-MCNC: 9.9 G/DL (ref 11.5–15.4)
IMM GRANULOCYTES # BLD AUTO: 0.02 THOUSAND/UL (ref 0–0.2)
IMM GRANULOCYTES NFR BLD AUTO: 0 % (ref 0–2)
LYMPHOCYTES # BLD AUTO: 1.42 THOUSANDS/ÂΜL (ref 0.6–4.47)
LYMPHOCYTES NFR BLD AUTO: 29 % (ref 14–44)
MCH RBC QN AUTO: 28.5 PG (ref 26.8–34.3)
MCHC RBC AUTO-ENTMCNC: 31.7 G/DL (ref 31.4–37.4)
MCV RBC AUTO: 90 FL (ref 82–98)
MONOCYTES # BLD AUTO: 0.53 THOUSAND/ÂΜL (ref 0.17–1.22)
MONOCYTES NFR BLD AUTO: 11 % (ref 4–12)
NEUTROPHILS # BLD AUTO: 2.79 THOUSANDS/ÂΜL (ref 1.85–7.62)
NEUTS SEG NFR BLD AUTO: 55 % (ref 43–75)
NRBC BLD AUTO-RTO: 0 /100 WBCS
PLATELET # BLD AUTO: 192 THOUSANDS/UL (ref 149–390)
PMV BLD AUTO: 10.8 FL (ref 8.9–12.7)
POTASSIUM SERPL-SCNC: 4.6 MMOL/L (ref 3.5–5.3)
RBC # BLD AUTO: 3.47 MILLION/UL (ref 3.81–5.12)
SODIUM SERPL-SCNC: 137 MMOL/L (ref 135–147)
WBC # BLD AUTO: 4.98 THOUSAND/UL (ref 4.31–10.16)

## 2023-02-25 RX ORDER — LABETALOL HYDROCHLORIDE 5 MG/ML
10 INJECTION, SOLUTION INTRAVENOUS EVERY 6 HOURS PRN
Status: DISCONTINUED | OUTPATIENT
Start: 2023-02-25 | End: 2023-02-26 | Stop reason: HOSPADM

## 2023-02-25 RX ORDER — LISINOPRIL 20 MG/1
20 TABLET ORAL DAILY
Status: DISCONTINUED | OUTPATIENT
Start: 2023-02-25 | End: 2023-02-26 | Stop reason: HOSPADM

## 2023-02-25 RX ADMIN — ACETAMINOPHEN 325 MG: 325 TABLET ORAL at 22:36

## 2023-02-25 RX ADMIN — Medication 250 MG: at 08:03

## 2023-02-25 RX ADMIN — NYSTATIN 1 APPLICATION.: 100000 POWDER TOPICAL at 08:04

## 2023-02-25 RX ADMIN — NYSTATIN: 100000 POWDER TOPICAL at 17:48

## 2023-02-25 RX ADMIN — INSULIN LISPRO 2 UNITS: 100 INJECTION, SOLUTION INTRAVENOUS; SUBCUTANEOUS at 12:16

## 2023-02-25 RX ADMIN — LATANOPROST 1 DROP: 50 SOLUTION OPHTHALMIC at 23:57

## 2023-02-25 RX ADMIN — ACETAMINOPHEN 650 MG: 325 TABLET ORAL at 03:29

## 2023-02-25 RX ADMIN — LISINOPRIL 20 MG: 20 TABLET ORAL at 12:16

## 2023-02-25 RX ADMIN — OXYBUTYNIN CHLORIDE 5 MG: 5 TABLET ORAL at 08:03

## 2023-02-25 RX ADMIN — ASPIRIN 81 MG CHEWABLE TABLET 81 MG: 81 TABLET CHEWABLE at 08:02

## 2023-02-25 RX ADMIN — HEPARIN SODIUM 5000 UNITS: 5000 INJECTION INTRAVENOUS; SUBCUTANEOUS at 22:36

## 2023-02-25 RX ADMIN — Medication 12.5 MG: at 08:03

## 2023-02-25 RX ADMIN — PANTOPRAZOLE SODIUM 40 MG: 40 TABLET, DELAYED RELEASE ORAL at 08:03

## 2023-02-25 RX ADMIN — OXYBUTYNIN CHLORIDE 5 MG: 5 TABLET ORAL at 17:48

## 2023-02-25 RX ADMIN — HEPARIN SODIUM 5000 UNITS: 5000 INJECTION INTRAVENOUS; SUBCUTANEOUS at 05:47

## 2023-02-25 RX ADMIN — Medication 250 MG: at 17:48

## 2023-02-25 RX ADMIN — ATORVASTATIN CALCIUM 40 MG: 40 TABLET, FILM COATED ORAL at 08:02

## 2023-02-25 RX ADMIN — LEVOTHYROXINE SODIUM 37.5 MCG: 75 TABLET ORAL at 08:03

## 2023-02-25 RX ADMIN — OXYCODONE HYDROCHLORIDE 10 MG: 10 TABLET ORAL at 15:31

## 2023-02-25 RX ADMIN — INSULIN LISPRO 2 UNITS: 100 INJECTION, SOLUTION INTRAVENOUS; SUBCUTANEOUS at 22:40

## 2023-02-25 RX ADMIN — LABETALOL HYDROCHLORIDE 10 MG: 5 INJECTION, SOLUTION INTRAVENOUS at 09:52

## 2023-02-25 RX ADMIN — ZOLPIDEM TARTRATE 10 MG: 5 TABLET ORAL at 22:36

## 2023-02-25 RX ADMIN — INSULIN LISPRO 3 UNITS: 100 INJECTION, SOLUTION INTRAVENOUS; SUBCUTANEOUS at 17:49

## 2023-02-25 RX ADMIN — Medication 12.5 MG: at 20:24

## 2023-02-25 RX ADMIN — PANTOPRAZOLE SODIUM 40 MG: 40 TABLET, DELAYED RELEASE ORAL at 20:24

## 2023-02-25 RX ADMIN — HEPARIN SODIUM 5000 UNITS: 5000 INJECTION INTRAVENOUS; SUBCUTANEOUS at 14:59

## 2023-02-25 NOTE — ASSESSMENT & PLAN NOTE
[facility] Emergency Department    [address]    [facility] [address]    Phone:  [phone]    Fax:  [phone]                                       Teddy Fisher   MRN: [id number]    Department:  [facility] Emergency Department   Date of Visit:  10/13/2018           Patient Information     Date Of Birth          1983        Your diagnoses for this visit were:     Chemical exposure of eye        You were seen by Dileep Roque MD.      Follow-up Information     Follow up with Eye Clinic In 2 days.    Specialty:  Ophthalmology    Why:  If your eyes are not completely healed by Monday, recheck with the eye doctors.  If you have any worsening pain, worsening vision, or any concern, return to ER right away    Contact information:    [facility]  [address] [facility]  9th Fl Clin [address] [place] [id number]  [phone]    Additional information:    Located in the [facility].  Parking is available in the Patient/Visitor parking ramp located on the corner of [facility] and [facility].        Discharge Instructions         Exposición ocular, Química  Pricilla exposición química, o lesión, del brandon se puede producir cuando pricilla solución ácida o alcalina entra en contacto con el brandon. En cuanto se produce la exposición química, es muy importante irrigar y enjuagar el brandon con pricilla solución salina estéril. Si no cuenta con pricilla solución salina estéril de inmediato, el enjuague con agua de la canilla es pricilla alternativa aceptable. Después de enjuagar el brandon, es importante realizar un seguimiento con un proveedor de atención médica si tiene visión borrosa o siente dolor.  La exposición química puede causar desde pricilla irritación leve hasta pricilla cicatriz duradera (permanente) y pérdida de la visión. La gravedad de la lesión depende de:    Qué tipo de sustancia química causó la lesión.    La concentración de la  · Patient's baseline creatinine is 0 99, consistent with CKD 3A  Presented with SADAF with creatinine 1 6  · This is now resolved  · Likely prerenal with patient having diarrhea for several days; seems to be self-limited  · Avoid nephrotoxins  Avoid hypotension  · Can resume lisinopril  · Hold off chlorthalidone that was just prescribed recently  sustancia.    Si era pricilla solución ácido o alcalina.    El tiempo que la sustancia química permaneció en root brandon.  Es común que presente cierta irritación yudith las próximas 24 horas, incluso en los casos leves. Si la exposición fue algo más maribell, mary las visitas de control según le hayan indicado.  La presión dentro del brandon puede aumentar yudith horas o días después de que el brandon tuvo pricilla lesión por contacto con pricilla sustancia química. Eso requiere tratamiento inmediato. Observe si se presentan los síntomas que se describen más abajo.  Cuidados en root casa    Puede aplicar pricilla compresa fría sobre el brandon yudith 20 minutos cada vez, para aliviar el dolor. Para armar pricilla compresa fría, coloque cubos de hielo en pricilla bolsa plástica que tenga cierre en la parte de arriba y envuélvala con pricilla toalla o un paño dacosta y limpio.     Es posible que le receten gotas para los ojos a fin de aliviar la irritación, la inflamación y el riesgo de infección. Si no le recetaron gotas, puede usar gotas descongestionantes para los ojos que vickie de venta sin receta para aliviar la irritación o el enrojecimiento.    Puede usar acetaminofén o ibuprofeno para controlar el dolor, a menos que le hayan recetado otro medicamento. Si tiene pricilla enfermedad hepática o renal crónica, o ha tenido alguna vez pricilla úlcera estomacal o sangrado gastrointestinal, consulte con root médico antes de prieto estos medicamentos.    Si le colocaron pricilla venda en el brandon:  ? Puede colocar la compresa de hielo directamente sobre la venda.  ? Si le dieron pricilla wilberto para que regrese a que le quiten la venda y vuelvan a examinarle el brandon, no falte. Pricilla venda colocada sobre el brandon no debe dejarse yudith más de 48 horas, a menos que así se lo indique el proveedor de atención médica.  ? No conduzca un vehículo automotor ni use máquinas mientras tenga puesta la venda en el brandon. Es difícil calcular las distancias con un solo brandon.  Visitas de control  Programe pricilla vista de  control con root proveedor de atención médica, o según se le haya indicado.  Cuándo debe buscar atención médica  Llame de inmediato a root proveedor de atención médica  si algo de lo siguiente ocurre:    Hinchazón del párpado que va en aumento.    El dolor que siente en el brandon va en aumento.    Mayor enrojecimiento o supuración de líquido del brandon.    No recupera la visión normal dentro de las próximas 24 a 48 horas.    Sigue sintiendo molestias en el brandon después de transcurridas 48 horas.  Date Last Reviewed: 11/16/2017 2000-2017 [employer] [employer]. [address] [place] [address]. Todos los derechos reservados. Esta información no pretende sustituir la atención médica profesional. Sólo root médico puede diagnosticar y tratar un problema de batsheva.          24 Hour Appointment Hotline       To make an appointment at any [facility] clinic, call [phone] ([phone]). If you don't have a family doctor or clinic, we will help you find one. [facility] clinics are conveniently located to serve the needs of you and your family.             Review of your medicines      START taking        Dose / Directions Last dose taken    HYDROcodone-acetaminophen 5-325 MG per tablet   Commonly known as:  NORCO   Dose:  1 tablet   Quantity:  10 tablet        Take 1 tablet by mouth every 6 hours as needed for severe pain   Refills:  0        tobramycin 0.3 % ophthalmic solution   Commonly known as:  TOBREX   Dose:  1-2 drop   Quantity:  1 Bottle        Place 1-2 drops into both eyes every 2 hours   Refills:  0                Information about OPIOIDS     PRESCRIPTION OPIOIDS: WHAT YOU NEED TO KNOW   We gave you an opioid (narcotic) pain medicine. It is important to manage your pain, but opioids are not always the best choice. You should first try all the other options your care team gave you. Take this medicine for as short a time (and as few doses) as possible.    Some activities can increase your pain, such as  bandage changes or therapy sessions. It may help to take your pain medicine 30 to 60 minutes before these activities. Reduce your stress by getting enough sleep, working on hobbies you enjoy and practicing relaxation or meditation. Talk to your care team about ways to manage your pain beyond prescription opioids.    These medicines have risks:    DO NOT drive when on new or higher doses of pain medicine. These medicines can affect your alertness and reaction times, and you could be arrested for driving under the influence (DUI). If you need to use opioids long-term, talk to your care team about driving.    DO NOT operate heavy machinery    DO NOT do any other dangerous activities while taking these medicines.    DO NOT drink any alcohol while taking these medicines.     If the opioid prescribed includes acetaminophen, DO NOT take with any other medicines that contain acetaminophen. Read all labels carefully. Look for the word  acetaminophen  or  Tylenol.  Ask your pharmacist if you have questions or are unsure.    You can get addicted to pain medicines, especially if you have a history of addiction (chemical, alcohol or substance dependence). Talk to your care team about ways to reduce this risk.    All opioids tend to cause constipation. Drink plenty of water and eat foods that have a lot of fiber, such as fruits, vegetables, prune juice, apple juice and high-fiber cereal. Take a laxative (Miralax, milk of magnesia, Colace, Senna) if you don t move your bowels at least every other day. Other side effects include upset stomach, sleepiness, dizziness, throwing up, tolerance (needing more of the medicine to have the same effect), physical dependence and slowed breathing.    Store your pills in a secure place, locked if possible. We will not replace any lost or stolen medicine. If you don t finish your medicine, please throw away (dispose) as directed by your pharmacist. The [place] Pollution Control Agency has more  information about safe disposal: https://www.pca.[facility].mn.us/living-green/managing-unwanted-medications        Prescriptions were sent or printed at these locations (2 Prescriptions)                   Other Prescriptions                Printed at Department/Unit printer (2 of 2)         HYDROcodone-acetaminophen (NORCO) 5-325 MG per tablet               tobramycin (TOBREX) 0.3 % ophthalmic solution                Orders Needing Specimen Collection     None      Pending Results     No orders found from 10/11/2018 to 10/14/2018.            Pending Culture Results     No orders found from 10/11/2018 to 10/14/2018.            Pending Results Instructions     If you had any lab results that were not finalized at the time of your Discharge, you can call the ED Lab Result RN at [phone]. You will be contacted by this team for any positive Lab results or changes in treatment. The nurses are available 7 days a week from 10A to 6:30P.  You can leave a message 24 hours per day and they will return your call.        Test Results From Your Hospital Stay               Clinical Quality Measure: Blood Pressure Screening     Your blood pressure was checked while you were in the emergency department today. The last reading we obtained was  BP: 126/69 . Please read the guidelines below about what these numbers mean and what you should do about them.  If your systolic blood pressure (the top number) is less than 120 and your diastolic blood pressure (the bottom number) is less than 80, then your blood pressure is normal. There is nothing more that you need to do about it.  If your systolic blood pressure (the top number) is 120-139 or your diastolic blood pressure (the bottom number) is 80-89, your blood pressure may be higher than it should be. You should have your blood pressure rechecked within a year by a primary care provider.  If your systolic blood pressure (the top number) is 140 or greater or your diastolic blood pressure  "(the bottom number) is 90 or greater, you may have high blood pressure. High blood pressure is treatable, but if left untreated over time it can put you at risk for heart attack, stroke, or kidney failure. You should have your blood pressure rechecked by a primary care provider within the next 4 weeks.  If your provider in the emergency department today gave you specific instructions to follow-up with your doctor or provider even sooner than that, you should follow that instruction and not wait for up to 4 weeks for your follow-up visit.        Thank you for choosing [facility]       Thank you for choosing [facility] for your care. Our goal is always to provide you with excellent care. Hearing back from our patients is one way we can continue to improve our services. Please take a few minutes to complete the written survey that you may receive in the mail after you visit with us. Thank you!        [employer] Information     [employer] lets you send messages to your doctor, view your test results, renew your prescriptions, schedule appointments and more. To sign up, go to [email or web address] . Click on \"Log in\" on the left side of the screen, which will take you to the Welcome page. Then click on \"Sign up Now\" on the right side of the page.     You will be asked to enter the access code listed below, as well as some personal information. Please follow the directions to create your username and password.     Your access code is: [email or web address]  Expires: 2019 10:23 AM     Your access code will  in 90 days. If you need help or a new code, please call your [facility] clinic or [phone].        Care EveryWhere ID     This is your Care EveryWhere ID. This could be used by other organizations to access your [facility] medical records  [id number]        Equal Access to Services     CAMRYN RODRIGUEZ AH: Nathaly Hickman, kimi ferrell, silvestre corona. So norberto " [phone].    ATENCIÓN: Si habla español, tiene a root disposición servicios gratuitos de asistencia lingüística. Llame al [phone].    We comply with applicable federal civil rights laws and [place] laws. We do not discriminate on the basis of race, color, national origin, age, disability, sex, sexual orientation, or gender identity.            After Visit Summary       This is your record. Keep this with you and show to your community pharmacist(s) and doctor(s) at your next visit.

## 2023-02-25 NOTE — PLAN OF CARE
Problem: PHYSICAL THERAPY ADULT  Goal: Performs mobility at highest level of function for planned discharge setting  See evaluation for individualized goals  Description: Treatment/Interventions: Functional transfer training, LE strengthening/ROM, Elevations, Endurance training, Therapeutic exercise, Cognitive reorientation, Patient/family training, Equipment eval/education, Bed mobility, Gait training          See flowsheet documentation for full assessment, interventions and recommendations  Note:    Problem List: Decreased strength, Decreased endurance, Impaired balance, Decreased mobility, Decreased cognition, Decreased safety awareness, Impaired tone, Obesity, Pain  Assessment: Pt presents with confusion  Dx: diarrhea, B LE edema, normocytic anemia, encephalopathy, DM, SADAF, ambulatory dysfunction, and chronic pain  order placed for PT eval and tx, w/ activity order of up w/ assist and ambulate patient  pt presents w/ comorbidities of anemia, arthritis, asthma, chronic pain, DM, dysphagia, gastroparesis, hypercholesterolemia, HTN, CVA, back surgery, and spinal cord stimulator placement and personal factors of advanced age, mobilizing w/ assistive device, stair(s) to enter home and anxiety  pt presents w/ pain, weakness, decreased endurance, impaired cognition, impaired balance, gait deviations, impaired tone, decreased safety awareness, fall risk and LE edema  these impairments are evident in findings from physical examination (weakness, altered sensation, edema of extremities and impaired tone), mobility assessment (need for min to mod assist w/ all phases of mobility when usually mobilizing independently, tolerance to only 6 feet of ambulation and need for cueing for mobility technique), and Barthel Index: 50/100  pt needed input for task focus and mobility technique  pt is at risk for falls due to physical and safety awareness deficits   pt's clinical presentation is unstable/unpredictable (evident in poor blood sugar control, poor blood pressure control, need for assist w/ all phases of mobility when usually mobilizing independently, tolerance to only 6 feet of ambulation and need for input for task focus and mobility technique)  pt needs inpatient PT tx to improve mobility deficits and progress mobility training as appropriate  discharge recommendation is for inpatient rehab to reduce fall risk and maximize level of functional independence  PT Discharge Recommendation: Post acute rehabilitation services    See flowsheet documentation for full assessment

## 2023-02-25 NOTE — ASSESSMENT & PLAN NOTE
Lab Results   Component Value Date    HGBA1C 7 7 (H) 02/20/2023     Recent Labs     02/24/23  1655 02/24/23  2110 02/25/23  0751 02/25/23  1130   POCGLU 166* 119 130 209*       Blood Sugar Average: Last 72 hrs:  · Hold off patient's metformin while in the hospital   · Insulin sliding scale  · Accu-Cheks  · Hypoglycemia protocol  · Adjust treatment accordingly    67 [phone] 02 83

## 2023-02-25 NOTE — CASE MANAGEMENT
Case Management Assessment & Discharge Planning Note    Patient name Starfercho Mode  Location W /W -01 MRN [id number]  : 1945 Date 2023       Current Admission Date: 2023  Current Admission [address] [address]   Patient Active Problem List    Diagnosis Date Noted   • Confusion with body shakes and blank stare 2023   • Normocytic anemia 2023   • Bilateral lower extremity edema 2023   • Continuous opioid dependence ([address] ) 2022   • Cerebrovascular accident (CVA), unspecified mechanism ([address] ) 2022   • Ambulatory dysfunction 2022   • Obesity, morbid ([facility] [address] ) 2021   • Prepyloric ulcer 2021   • Diarrhea 2021   • Mild intermittent asthma without complication    • Iron deficiency anemia secondary to inadequate dietary iron intake 2018   • Failed back surgical syndrome 2018   • Hypothyroidism 2017   • Degenerative lumbar spinal stenosis 2017   • Glaucoma 2017   • Overactive bladder 2016   • Dyspepsia 2016   • Hypercholesterolemia 2016   • Type 2 diabetes mellitus ([facility] ) 2015   • Anxiety 2015   • Chronic pain disorder 2013   • Hypertension 2013      LOS (days): 2  Geometric Mean LOS (GMLOS) (days): 3 10  Days to GMLOS:1 1     OBJECTIVE:    Risk of Unplanned Readmission Score: 30 24         Current admission status: Inpatient       Preferred Pharmacy:   [address], [address] [place] [address]  [address] [place] [address]  Phone: [phone] Fax: [phone] [address] [address]  Phone: [phone] Fax: [phone]    [address] [place] [address] Maia Beyer [address] [address]  Phone: [phone] Fax: [phone] - Lawanda Dominguez [address] [place] [address] [address] [facility]  Phone: [phone] Fax: [phone]    Primary Care Provider: NANY Garcia    Primary Insurance: [facility] [address] [facility]  Secondary Insurance:     ASSESSMENT:  [address] Proxies    There are no active Health Care Proxies on file  Patient Information  Admitted from[de-identified] Home  Mental Status: Alert  During Assessment patient was accompanied by: Not accompanied during assessment  Assessment information provided by[de-identified] Patient  Primary Caregiver: Self  Support Systems: Self, Children, Son, Daughter  [place] of Residence: [address] [place] [address] do you live in?: [place] entry access options   Select all that apply : Stairs  Number of steps to enter home : 3  Do the steps have railings?: Yes  Type of Current Residence: Apartment  Floor Level: 1  Upon entering residence, is there a bedroom on the main floor (no further steps)?: Yes  Upon entering residence, is there a bathroom on the main floor (no further steps)?: Yes  In the last 12 months, was there a time when you were not able to pay the mortgage or rent on time?: No  In the last 12 months, how many places have you lived?: 1  In the last 12 months, was there a time when you did not have a steady place to sleep or slept in a shelter (including now)?: No  Homeless/housing insecurity resource given?: N/A  Living Arrangements: Lives w/ Son  Is patient a ?: No    Activities of Daily Living Prior to Admission  Functional Status: Independent  Completes ADLs independently?: Yes  Ambulates independently?: Yes  Does patient use assisted devices?: Yes  Assisted Devices (DME) used: Octavio Peña  Does patient currently own DME?: Yes  What DME does the patient currently own?: Straight Cane, Walker  Does patient have a history of Outpatient Therapy (PT/OT)?: Yes (prior to hospitalization was going to [address]  in [facility])  Does the patient have a history of Short-Term Rehab?: No  Does patient have a history of HHC?: Yes (cannot recall agency, but confirmed history a few years ago)  Does patient currently have [address]?: No         Patient Information Continued  Does patient have prescription coverage?: Yes  Within the past 12 months, you worried that your food would run out before you got the money to buy more : Never true  Within the past 12 months, the food you bought just didn't last and you didn't have money to get more : Never true  Food insecurity resource given?: N/A  Does patient receive dialysis treatments?: No         Means of Transportation  Means of Transport to Appts[de-identified] Family transport  In the past 12 months, has lack of transportation kept you from medical appointments or from getting medications?: No  In the past 12 months, has lack of transportation kept you from meetings, work, or from getting things needed for daily living?: No  Was application for public transport provided?: N/A        DISCHARGE DETAILS:    Discharge planning discussed with[de-identified] patient at bedside; daughter, Cedric Funk, by phone  Freedom of Choice: Yes (re: rehab)  Comments - Freedom of Choice: patient refusing STR; reports preference to return home with her out-patient therapy services  CM contacted family/caregiver?: Yes (daughter, Cedric Funk, by phone)  Were Treatment Team discharge recommendations reviewed with patient/caregiver?: Yes  Did patient/caregiver verbalize understanding of patient care needs?: Yes  Were patient/caregiver advised of the risks associated with not following Treatment Team discharge recommendations?: Yes    Contacts  Patient Contacts: Cedric Funk, daughter  Relationship to Patient[de-identified] Family  Reason/Outcome: Continuity of Care, Emergency Contact, Referral, Discharge Planning    Requested [address]         Is the patient interested in [address] at discharge?: No    DME Referral Provided  Referral made for DME?: No    Other Referral/Resources/Interventions Provided:  Interventions: Outpatient OT, Outpatient PT  Referral Comments: Patient admitted due to diarrhea; ruling out cdiff  PT/OT evaluations both complete and recommending rehab  Met with patient at bedside to discuss same  Patient reports she lives at home with her son - apartment is on the 1st floor with 3 steps (with railings) to enter  Patient reports that she usually ambulates with her walker or cane  Denies any history of rehab  Reports history of home care services "a few years ago," but unable to recall agency  Patient states she's currently going to out-patient therapy through [facility]  PT/OT evaluations reviewed, but patient declining rehab recommendation at this time  States preference to return home with resumption of out-patient therapy  Offer made to contact family; patient reports that she's already talked to daughter about resuming out-patient therapy, and they were in agreement with same  Call made to patient's daughter, Len Demarco, and reviewed conversation with patient re: resumption of outpatient therapy  Daughter does confirm that patient refusing rehab, stating that patient's spouse had multiple negative experiences at rehab facilities which have impacted patient's perception  Daughter does confirm that family able to accommodate patient at home, and states that they have a family friend who will be coming to the home to assist with therapy/patient's care as well  Daughter/patient state that family or a neighbor provides transportation to appointments, and granddaughter likely to pick-up patient at discharge  Anticipate d/c plan is for patient to return home with resumption of out-patient therapy, but will continue to follow      Would you like to participate in our [address] service program?  : No - Declined    Treatment Team Recommendation: Home  Discharge Destination Plan[de-identified] Home  Transport at Discharge : Family                             IMM Given (Date):: 02/23/23  IMM Given to[de-identified] Patient

## 2023-02-25 NOTE — PROGRESS NOTES
[facility]  Progress Note - Chyna Liter 1945, 76 y o  female MRN: [id number]  Unit/Bed#: W -01 Encounter: [id number]  Primary Care Provider: Yashira Florence   Date and time admitted to hospital: 2/23/2023  2:08 PM    * Diarrhea  Assessment & Plan  · Patient had been having diarrhea since last Sunday  She took a dose of Questran which she has at home for irritable bowel syndrome and has had no additional events  · Patient had recent course of antibiotic treatment for UTI approximately 3 weeks ago  · Stools for bacterial enteric PCR as well as C  difficile PCR/EIA were requested but not yet sent due to lack of diarrhea  · Please send any stool to the lab for evaluation    Bilateral lower extremity edema  Assessment & Plan  · Patient has history of chronic bilateral lower extremity edema, but had been worsening lately  According to the patient's daughter, who is a doctor, no history of congestive heart failure  · Lower extremity Doppler negative for DVT  · Updated echocardiogram with preserved EF and no diastolic dysfunction    Normocytic anemia  Assessment & Plan  · Patient had anemia since July 2022  · Presently normocytic with normal RDW  · Stable as compared to previous levels since July 2022  · According to the patient, at times she would have occasional hemorrhoidal bleeding  But no active bleeding at this point  · Monitor  Confusion with body shakes and blank stare  Assessment & Plan  · According to the patient's daughter, who is one of our [facility] primary care physicians, patient had been having occasional confusion, body shakes and blank stare  · Neurology consult noted  They felt this is more likely encephalopathy rather than seizure  Unable to do MRI due to spinal stimulator  · Geriatrics consulted due to polypharmacy    Need to attempt to wean down/limit mind altering medications as much as feasible    Type 2 diabetes mellitus [facility])  Assessment & Plan  Lab Results   Component Value Date    HGBA1C 7 7 (H) 02/20/2023     Recent Labs     02/24/23  1655 02/24/23  2110 02/25/23  0751 02/25/23  1130   POCGLU 166* 119 130 209*       Blood Sugar Average: Last 72 hrs:  · Hold off patient's metformin while in the hospital   · Insulin sliding scale  · Accu-Cheks  · Hypoglycemia protocol  · Adjust treatment accordingly  (P) 040 [id number]    SADAF (acute kidney injury) (HCC)-resolved as of 2/25/2023  Assessment & Plan  · Patient's baseline creatinine is 0 99, consistent with CKD 3A  Presented with SADAF with creatinine 1 6  · This is now resolved  · Likely prerenal with patient having diarrhea for several days; seems to be self-limited  · Avoid nephrotoxins  Avoid hypotension  · Can resume lisinopril  · Hold off chlorthalidone that was just prescribed recently  VTE Pharmacologic Prophylaxis: VTE Score: 4 Moderate Risk (Score 3-4) - Pharmacological DVT Prophylaxis Ordered: heparin  Patient Centered Rounds: I performed bedside rounds with nursing staff today  Discussions with Specialists or Other Care Team Provider: rachelle, rn    Education and Discussions with Family / Patient: Updated  (daughter) via phone  Time Spent for Care: 45 minutes  More than 50% of total time spent on counseling and coordination of care as described above  Current Length of Stay: 2 day(s)  Current Patient Status: Inpatient   Certification Statement: The patient will continue to require additional inpatient hospital stay due to Dehydration with diarrhea as well as amatory dysfunction needing PT eval and possible rehab placement  Discharge Plan: Anticipate discharge in 48-72 hrs to discharge location to be determined pending rehab evaluations  Code Status: Level 1 - Full Code    Subjective:   No overnight events per nursing  No more diarrhea noted  Patient has been eating, and is feeling okay    Awaiting PT evaluation today to determine discharge disposition  Objective:     Vitals:   Temp (24hrs), Av 5 °F (37 5 °C), Min:99 3 °F (37 4 °C), Max:99 8 °F (37 7 °C)    Temp:  [99 3 °F (37 4 °C)-99 8 °F (37 7 °C)] 99 3 °F (37 4 °C)  HR:  [] 79  Resp:  [17-19] 18  BP: (109-192)/() 164/87  SpO2:  [93 %-96 %] 94 %  Body mass index is 36 73 kg/m²  Input and Output Summary (last 24 hours): Intake/Output Summary (Last 24 hours) at 2023 1140  Last data filed at 2023 0901  Gross per 24 hour   Intake 200 ml   Output 2100 ml   Net -1900 ml       Physical Exam:   Physical Exam  Vitals reviewed  Constitutional:       General: She is not in acute distress  Appearance: She is obese  She is not ill-appearing, toxic-appearing or diaphoretic  HENT:      Nose: No congestion  Eyes:      General: No scleral icterus  Right eye: No discharge  Left eye: No discharge  Conjunctiva/sclera: Conjunctivae normal    Cardiovascular:      Rate and Rhythm: Normal rate and regular rhythm  Heart sounds: No murmur heard  Pulmonary:      Effort: No respiratory distress  Breath sounds: No stridor  No wheezing or rhonchi  Comments: Bibasilar crackles noted  No dyspnea or tachypnea noted  Not requiring oxygen  Abdominal:      General: There is no distension  Palpations: Abdomen is soft  Tenderness: There is no abdominal tenderness  There is no guarding  Musculoskeletal:      Right lower leg: Edema present  Left lower leg: Edema present  Comments: Significant bilateral lower extremity edema noted   Skin:     General: Skin is warm and dry  Coloration: Skin is not jaundiced or pale  Findings: No bruising, erythema, lesion or rash  Neurological:      Mental Status: She is alert  Comments: Awake alert interactive, no overt confusion    No abnormal movements   Psychiatric:      Comments: Flat affect          Additional Data:     Labs:  Results from last 7 days   Lab Units 02/25/23  0532   WBC Thousand/uL 4 98   HEMOGLOBIN g/dL 9 9*   HEMATOCRIT % 31 2*   PLATELETS Thousands/uL 192   NEUTROS PCT % 55   LYMPHS PCT % 29   MONOS PCT % 11   EOS PCT % 4     Results from last 7 days   Lab Units 02/25/23  0532 02/24/23  0459 02/23/23  1533   SODIUM mmol/L 137   < > 138   POTASSIUM mmol/L 4 6   < > 5 0   CHLORIDE mmol/L 105   < > 103   CO2 mmol/L 27   < > 28   BUN mg/dL 24   < > 40*   CREATININE mg/dL 1 00   < > 1 68*   ANION GAP mmol/L 5   < > 7   CALCIUM mg/dL 9 1   < > 9 7   ALBUMIN g/dL  --   --  3 7   TOTAL BILIRUBIN mg/dL  --   --  0 39   ALK PHOS U/L  --   --  89   ALT U/L  --   --  9   AST U/L  --   --  16   GLUCOSE RANDOM mg/dL 135   < > 99    < > = values in this interval not displayed  Results from last 7 days   Lab Units 02/23/23  1533   INR  0 93     Results from last 7 days   Lab Units 02/25/23  1130 02/25/23  0751 02/24/23  2110 02/24/23  1655 02/24/23  1138 02/24/23  0707   POC GLUCOSE mg/dl 209* 130 119 166* 133 117     Results from last 7 days   Lab Units 02/20/23  0958   HEMOGLOBIN A1C % 7 7*     Results from last 7 days   Lab Units 02/23/23  1533   LACTIC ACID mmol/L 1 3       Lines/Drains:  Invasive Devices     Peripheral Intravenous Line  Duration           Peripheral IV 02/23/23 Left Antecubital 1 day    Peripheral IV 02/24/23 Right Antecubital 1 day                      Imaging: No pertinent imaging reviewed  Recent Cultures (last 7 days):   Results from last 7 days   Lab Units 02/23/23  1548 02/23/23  1533   BLOOD CULTURE  No Growth at 24 hrs  No Growth at 24 hrs         Last 24 Hours Medication List:   Current Facility-Administered Medications   Medication Dose Route Frequency Provider Last Rate   • oxyCODONE  10 mg Oral Q6H PRN Ya Cooper PA-C      And   • acetaminophen  325 mg Oral Q6H PRN Ya Cooper PA-C     • acetaminophen  650 mg Oral Q6H PRN Randy Alvarez MD     • albuterol  2 puff Inhalation Q6H PRN Randy Mora Molina MD     • aspirin  81 mg Oral Daily Dorothy Handy MD     • atorvastatin  40 mg Oral Daily Randy Molina MD     • baclofen  10 mg Oral TID PRN Dorothy Handy MD     • benzonatate  100 mg Oral TID PRN Audra Later, CRNP     • gabapentin  300 mg Oral Daily PRN Dorothy Handy MD     • heparin (porcine)  5,000 Units Subcutaneous Q8H [id number] [place] [address] Dr Allison PA-C     • insulin lispro  1-5 Units Subcutaneous HS Dorothy Handy MD     • insulin lispro  1-6 Units Subcutaneous TID AC Randy Molina MD     • labetalol  10 mg Intravenous Q6H PRN Irena Larose PA-C     • latanoprost  1 drop Both Eyes HS Dorothy Tracie Handy MD     • levothyroxine  37 5 mcg Oral Daily Randy Molina MD     • lisinopril  20 mg Oral Daily Irena Larose PA-C     • meclizine  25 mg Oral 4x Daily PRN Dorothy Handy MD     • metoclopramide  10 mg Oral 4x Daily PRN Randy Molina MD     • metoprolol tartrate  12 5 mg Oral Q12H Pj Handy MD     • Milnacipran HCl  1 tablet Oral Daily Dorothy Handy MD     • nystatin   Topical BID Dorothy Handy MD     • oxybutynin  5 mg Oral BID Dorothy Handy MD     • pantoprazole  40 mg Oral Q12H Dorothy Handy MD     • saccharomyces boulardii  250 mg Oral BID Dorothy Handy MD     • sodium chloride  1 spray Each Nare Q1H PRN Audra Later, CRNP     • zolpidem  10 mg Oral HS PRN Kristi Hargrove MD          Today, Patient Was Seen By: Lisa Pichardo PA-C    **Please Note: This note may have been constructed using a voice recognition system  **

## 2023-02-25 NOTE — PHYSICAL THERAPY NOTE
PHYSICAL THERAPY EVALUATION NOTE    Patient Name: Aniya RIVAS Date: 2/25/2023  AGE:   76 y o   Mrn:   [id number]  ADMIT DX:  Diarrhea [R19 7]  Dehydration [E86 0]  Asthenia [R53 1]  Weakness [R53 1]  SADAF (acute kidney injury) ([address] ) [N17 9]  AMS (altered mental status) [R41 82]    Past Medical History:   Diagnosis Date    Acid reflux     Anemia     hx of iron-deficient    Anxiety     Arthritis     Asthma     last needed inhaler last year 2020    Basal cell carcinoma     upper lip    Chronic narcotic dependence (HCC)     Chronic pain     Colon polyp     Cystocele     Diabetes mellitus ([facility] )     stable    Disease of thyroid gland     hypothyroidism    Diverticulosis     Dizziness     at times    Dysfunctional uterine bleeding     last assessed - [id number]    Dysphagia     Fibromyalgia     Gastric ulcer     Gastroparesis     History of colonic polyps     last assessed - [id number]    History of gastroesophageal reflux (GERD)     Hypercholesterolemia     Hyperlipidemia     Hypertension     IBS (irritable bowel syndrome)     Pneumobilia 06/18/2022    Post laminectomy syndrome     S/P insertion of spinal cord stimulator 07/18/2018    Seasonal allergies     Shortness of breath     exertional    Spinal stenosis     Status post lumbar spinal fusion 03/16/2018    Stroke [facility])     pt states slight stroke March 2022     Length Of Stay: 2  PHYSICAL THERAPY EVALUATION :   02/25/23 1448   PT Last Visit   PT Visit Date 02/25/23   Pain Assessment   Pain Assessment Tool 0-10   Pain Score 3   Pain Location/Orientation Location: Head   Restrictions/Precautions   Other Precautions Cognitive; Bed Alarm; Chair Alarm;Contact/isolation;Multiple lines; Fall Risk;Pain   Home Living   Type of [address] [place] [address] Two level; Able to live on main level with bedroom/bathroom; Other (Comment)  (2-3 ELLYN )   Additional Comments lives w/ son   ambulates w/ roller walker  owns cane  independent w/ ADLs and IADLs  2 falls in last 6 months  pt was receiving outpatient PT - working towards transitioning from roller walker to cane  General   Additional Pertinent History 2/25/23 at 11:30 glucose was 209 and at 8:52 blood pressure was 171/94   Family/Caregiver Present No   Cognition   Arousal/Participation Cooperative   Attention Attends with cues to redirect   Orientation Level Oriented to person; Other (Comment)  (pt was identified w/ full name, birth date)   Following Commands Follows one step commands with increased time or repetition   Comments room air resting pulse ox 96% and 64 BPM, active 93% and 72 BPM   (minimal edema noted B LEs )   Subjective   Subjective pt seen supine in bed  agreed to PT eval  pt states having a headache  RUE Assessment   RUE Assessment WFL   LUE Assessment   LUE Assessment WFL   RLE Assessment   RLE Assessment WFL  (3 to 3+/5)   LLE Assessment   LLE Assessment WFL  (3 to 3+/5)   Coordination   Movements are Fluid and Coordinated 0   Coordination and Movement Description intermittent tremors noted UEs   Light Touch   RLE Light Touch Grossly intact   LLE Light Touch Grossly intact   Bed Mobility   Supine to Sit 4  Minimal assistance   Additional items Assist x 1;HOB elevated; Bedrails; Increased time required;Verbal cues;LE management  (for bedrail use, trunk/LE positioning)   Transfers   Sit to Stand 3  Moderate assistance   Additional items Assist x 1; Increased time required;Verbal cues  (for hand placement)   Stand to Sit 4  Minimal assistance  (poorly controlled descent to chair)   Additional items Assist x 1;Verbal cues  (for body positioning, safety)   Ambulation/Elevation   Gait pattern Foward flexed; Short stride; Wide RUBIN; Excessively slow   Gait Assistance 4  Minimal assist   Additional items Assist x 1;Verbal cues  (for walker positioning)   Assistive Device Rolling walker   Distance 6 feet  (additional not possible due to fatigue) Stair Management Assistance Not tested  (due to limited ambulation tolerance, safety concern)   Balance   Static Sitting Fair +   Static Standing Poor +  (w/ roller walker)   Ambulatory Poor +  (w/ roller walker)   Activity Tolerance   Activity Tolerance Patient limited by fatigue   Nurse Made Aware spoke to [facility] NSG   Assessment   Problem List Decreased strength;Decreased endurance; Impaired balance;Decreased mobility; Decreased cognition;Decreased safety awareness; Impaired tone;Obesity;Pain   Assessment Pt presents with confusion  Dx: diarrhea, B LE edema, normocytic anemia, encephalopathy, DM, SADAF, ambulatory dysfunction, and chronic pain  order placed for PT eval and tx, w/ activity order of up w/ assist and ambulate patient  pt presents w/ comorbidities of anemia, arthritis, asthma, chronic pain, DM, dysphagia, gastroparesis, hypercholesterolemia, HTN, CVA, back surgery, and spinal cord stimulator placement and personal factors of advanced age, mobilizing w/ assistive device, stair(s) to enter home and anxiety  pt presents w/ pain, weakness, decreased endurance, impaired cognition, impaired balance, gait deviations, impaired tone, decreased safety awareness, fall risk and LE edema  these impairments are evident in findings from physical examination (weakness, altered sensation, edema of extremities and impaired tone), mobility assessment (need for min to mod assist w/ all phases of mobility when usually mobilizing independently, tolerance to only 6 feet of ambulation and need for cueing for mobility technique), and Barthel Index: 50/100  pt needed input for task focus and mobility technique  pt is at risk for falls due to physical and safety awareness deficits   pt's clinical presentation is unstable/unpredictable (evident in poor blood sugar control, poor blood pressure control, need for assist w/ all phases of mobility when usually mobilizing independently, tolerance to only 6 feet of ambulation and need for input for task focus and mobility technique)  pt needs inpatient PT tx to improve mobility deficits and progress mobility training as appropriate  discharge recommendation is for inpatient rehab to reduce fall risk and maximize level of functional independence  Goals   Patient Goals go home   STG Expiration Date 03/07/23   Short Term Goal #1 pt will: Increase bilateral LE strength 1/2 grade to facilitate independent mobility, Perform bed mobility modified independent to increase level of independence, Perform all transfers w/ supervision to improve independence, Ambulate 120 ft  with roller walker w/ supervision w/o LOB to improve functional independence, Navigate 3 stair(s) w/ minx1 with unilateral handrail to facilitate return to previous living environment, Increase ambulatory balance 1 grade to decrease risk for falls, Tolerate 3 hr OOB to faciliate upright tolerance, Improve Barthel Index score to 70 or greater to facilitate independence and Complete Timed Up and Go or Comfortable Gait Speed to further assess mobility and monitor progress   PT Treatment Day 1   Plan   Treatment/Interventions Functional transfer training;LE strengthening/ROM; Elevations; Endurance training; Therapeutic exercise;Cognitive reorientation;Patient/family training;Equipment eval/education; Bed mobility;Gait training   PT Frequency 3-5x/wk   Recommendation   PT Discharge Recommendation Post acute rehabilitation services   AM-PAC Basic Mobility Inpatient   Turning in Flat Bed Without Bedrails 3   Lying on Back to Sitting on Edge of Flat Bed Without Bedrails 2   Moving Bed to Chair 3   Standing Up From Chair Using Arms 2   Walk in Room 3   Climb 3-5 Stairs With Railing 1   Basic Mobility Inpatient Raw Score 14   Basic Mobility Standardized Score 35 55   Highest Level Of Mobility   JH-HLM Goal 4: Move to chair/commode   JH-HLM Achieved 6: Walk 10 steps or more   Barthel Index   Feeding 10   Bathing 0   Grooming Score 5   Dressing Score 5 Bladder Score 5   Bowels Score 10   Toilet Use Score 5   Transfers (Bed/Chair) Score 10   Mobility (Level Surface) Score 0   Stairs Score 0   Barthel Index Score 50   Additional Treatment Session   Start Time 1448   End Time 1458   Treatment Assessment pt agreed to participate in PT intervention  pt agreed to participate in additional mobilization to address mobility and physical impairments noted during the eval  pt was provided education regarding walker management  sit <---> stand transfer w/ minx1  ambulated 20 feet w/ roller walker w/ minx1 and chair follow  additional ambulation was not possible due to fatigue  pt shows improvement w/ increased ambulation tolerance but continues to require same level of assistance  pt needs continued inpatient PT to address fall risk factors and maximzie independence  Equipment Use roller walker   Additional Treatment Day 1   End of Consult   Patient Position at End of Consult Bedside chair;Bed/Chair alarm activated; All needs within reach     The patient's AM-PAC Basic Mobility Inpatient Short Form Raw Score is 14  A Raw score of less than or equal to 16 suggests the patient may benefit from discharge to post-acute rehabilitation services  Please also refer to the recommendation of the Physical Therapist for safe discharge planning  Skilled PT recommended while in hospital and upon DC to progress pt toward treatment goals       Steve Ward, PT

## 2023-02-25 NOTE — ASSESSMENT & PLAN NOTE
· Patient has history of chronic bilateral lower extremity edema, but had been worsening lately  According to the patient's daughter, who is a doctor, no history of congestive heart failure    · Lower extremity Doppler negative for DVT  · Updated echocardiogram with preserved EF and no diastolic dysfunction

## 2023-02-25 NOTE — PLAN OF CARE
Problem: MOBILITY - ADULT  Goal: Maintain or return to baseline ADL function  Description: INTERVENTIONS:  -  Assess patient's ability to carry out ADLs; assess patient's baseline for ADL function and identify physical deficits which impact ability to perform ADLs (bathing, care of mouth/teeth, toileting, grooming, dressing, etc )  - Assess/evaluate cause of self-care deficits   - Assess range of motion  - Assess patient's mobility; develop plan if impaired  - Assess patient's need for assistive devices and provide as appropriate  - Encourage maximum independence but intervene and supervise when necessary  - Involve family in performance of ADLs  - Assess for home care needs following discharge   - Consider OT consult to assist with ADL evaluation and planning for discharge  - Provide patient education as appropriate  Outcome: Progressing  Goal: Maintains/Returns to pre admission functional level  Description: INTERVENTIONS:  - Perform BMAT or MOVE assessment daily    - Set and communicate daily mobility goal to care team and patient/family/caregiver     - Collaborate with rehabilitation services on mobility goals if consulted  - Stand patient 1 times a day  - Ambulate patient 1 times a day  - Out of bed to chair 1 times a day   - Out of bed for meals 1 times a day  - Out of bed for toileting  - Record patient progress and toleration of activity level   Outcome: Progressing

## 2023-02-25 NOTE — ASSESSMENT & PLAN NOTE
· Patient had anemia since July 2022  · Presently normocytic with normal RDW  · Stable as compared to previous levels since July 2022  · According to the patient, at times she would have occasional hemorrhoidal bleeding  But no active bleeding at this point  · Monitor

## 2023-02-25 NOTE — ASSESSMENT & PLAN NOTE
· Patient had been having diarrhea since last Sunday  She took a dose of Questran which she has at home for irritable bowel syndrome and has had no additional events  · Patient had recent course of antibiotic treatment for UTI approximately 3 weeks ago  · Stools for bacterial enteric PCR as well as C  difficile PCR/EIA were requested but not yet sent due to lack of diarrhea    · Please send any stool to the lab for evaluation

## 2023-02-26 VITALS
HEART RATE: 75 BPM | DIASTOLIC BLOOD PRESSURE: 95 MMHG | RESPIRATION RATE: 18 BRPM | WEIGHT: 181.8 LBS | SYSTOLIC BLOOD PRESSURE: 166 MMHG | TEMPERATURE: 99.1 F | HEIGHT: 60 IN | OXYGEN SATURATION: 93 % | BODY MASS INDEX: 35.69 KG/M2

## 2023-02-26 LAB
GLUCOSE SERPL-MCNC: 148 MG/DL (ref 65–140)
GLUCOSE SERPL-MCNC: 289 MG/DL (ref 65–140)

## 2023-02-26 RX ADMIN — HEPARIN SODIUM 5000 UNITS: 5000 INJECTION INTRAVENOUS; SUBCUTANEOUS at 06:24

## 2023-02-26 RX ADMIN — Medication 12.5 MG: at 08:00

## 2023-02-26 RX ADMIN — Medication 250 MG: at 08:00

## 2023-02-26 RX ADMIN — OXYBUTYNIN CHLORIDE 5 MG: 5 TABLET ORAL at 08:00

## 2023-02-26 RX ADMIN — PANTOPRAZOLE SODIUM 40 MG: 40 TABLET, DELAYED RELEASE ORAL at 08:00

## 2023-02-26 RX ADMIN — ATORVASTATIN CALCIUM 40 MG: 40 TABLET, FILM COATED ORAL at 08:00

## 2023-02-26 RX ADMIN — ASPIRIN 81 MG CHEWABLE TABLET 81 MG: 81 TABLET CHEWABLE at 08:00

## 2023-02-26 RX ADMIN — NYSTATIN 1 APPLICATION.: 100000 POWDER TOPICAL at 08:05

## 2023-02-26 RX ADMIN — INSULIN LISPRO 4 UNITS: 100 INJECTION, SOLUTION INTRAVENOUS; SUBCUTANEOUS at 11:58

## 2023-02-26 RX ADMIN — LEVOTHYROXINE SODIUM 37.5 MCG: 75 TABLET ORAL at 08:00

## 2023-02-26 RX ADMIN — LISINOPRIL 20 MG: 20 TABLET ORAL at 08:00

## 2023-02-26 NOTE — ASSESSMENT & PLAN NOTE
· Patient had been having diarrhea since last Sunday  She took a dose of Questran which she has at home for irritable bowel syndrome and has had no additional events  · Patient had recent course of antibiotic treatment for UTI approximately 3 weeks ago  · Stools for bacterial enteric PCR as well as C  difficile PCR/EIA were requested but not yet sent due to lack of diarrhea    · Please send any stool to the lab for evaluation after dc

## 2023-02-26 NOTE — ASSESSMENT & PLAN NOTE
Lab Results   Component Value Date    HGBA1C 7 7 (H) 02/20/2023     Recent Labs     02/25/23  1130 02/25/23  1544 02/25/23 2058 02/26/23  0737   POCGLU 209* 254* 226* 148*       Blood Sugar Average: Last 72 hrs:  · Resume metformin  (P) 074 [id number]

## 2023-02-26 NOTE — DISCHARGE SUMMARY
[facility]  Discharge- Juan Carlos Cuadra 1945, 76 y o  female MRN: [id number]  Unit/Bed#: W -01 Encounter: [id number]  Primary Care Provider: Arlina Closs   Date and time admitted to hospital: 2/23/2023  2:08 PM    * Diarrhea  Assessment & Plan  · Patient had been having diarrhea since last Sunday  She took a dose of Questran which she has at home for irritable bowel syndrome and has had no additional events  · Patient had recent course of antibiotic treatment for UTI approximately 3 weeks ago  · Stools for bacterial enteric PCR as well as C  difficile PCR/EIA were requested but not yet sent due to lack of diarrhea  · Please send any stool to the lab for evaluation after dc    Bilateral lower extremity edema  Assessment & Plan  · Patient has history of chronic bilateral lower extremity edema, but had been worsening lately  According to the patient's daughter, who is a doctor, no history of congestive heart failure  · Lower extremity Doppler negative for DVT  · Updated echocardiogram with preserved EF and no diastolic dysfunction    Normocytic anemia  Assessment & Plan  · Patient had anemia since July 2022  · Presently normocytic with normal RDW  · Stable as compared to previous levels since July 2022  · According to the patient, at times she would have occasional hemorrhoidal bleeding  But no active bleeding at this point  · Monitor  Confusion with body shakes and blank stare  Assessment & Plan  · According to the patient's daughter, who is one of our [facility] primary care physicians, patient had been having occasional confusion, body shakes and blank stare  · Neurology consult noted  They felt this is more likely encephalopathy rather than seizure  Unable to do MRI due to spinal stimulator  · Geriatrics consulted due to polypharmacy    Need to attempt to wean down/limit mind altering medications as much as feasible    Type 2 diabetes mellitus [facility])  Assessment & Plan  Lab Results   Component Value Date    HGBA1C 7 7 (H) 02/20/2023     Recent Labs     02/25/23  1130 02/25/23  1544 02/25/23 2058 02/26/23  0737   POCGLU 209* 254* 226* 148*       Blood Sugar Average: Last 72 hrs:  · Resume metformin  (P) 079 [id number]    Medical Problems     Resolved Problems  Date Reviewed: 2/25/2023          Resolved    SADAF (acute kidney injury) ([facility] [address] ) 2/25/2023     Resolved by  Ary Herrera PA-C        Discharging Physician / Practitioner: Irving Means PA-C  PCP: Rosmery [facility]   Admission Date:   Admission Orders (From admission, onward)     Ordered        02/23/23 1812  INPATIENT ADMISSION  Once                      Discharge Date: 02/26/23    Consultations During Hospital Stay:  · Neurology     Procedures Performed:   · none    Significant Findings / Test Results:   · SADAF with creatinine 1 6 on admission, normalized upon discharge  · Electrolytes stable    Incidental Findings:   · none     Test Results Pending at Discharge (will require follow up): · Stool studies     Outpatient Tests Requested:  · BMP    Complications:  None     Reason for Admission: SADAF and weakness    Hospital Course: Ascencion Mas is a 76 y o  female patient who originally presented to the hospital on 2/23/2023 due to diarrhea, dehydration and SADAF  Has had intermittent diarrhea in the outpatient setting, took a dose of cholestyramine which did help her symptoms  She was admitted for IV hydration, lisinopril and chlorthalidone was held  She clinically improved  Was seen by neurology throughout her stay, for staring episodes which were deemed likely medication induced with no evidence of seizure  Was seen by PT and OT who recommended her for rehab however she is declining and will go home with outpatient therapy  Please see above list of diagnoses and related plan for additional information       Condition at Discharge: stable    Discharge Day Visit / Exam: Subjective: Feeling good, wants to go home today  Does not want to go to rehab  Vitals: Blood Pressure: 166/95 (02/26/23 0857)  Pulse: 75 (02/26/23 0857)  Temperature: 99 1 °F (37 3 °C) (02/26/23 0743)  Temp Source: Oral (02/26/23 0741)  Respirations: 18 (02/26/23 0741)  Height: 5' (152 4 cm) (02/24/23 1400)  Weight - Scale: 82 5 kg (181 lb 12 8 oz) (02/26/23 0600)  SpO2: 93 % (02/26/23 0857)  Exam:   Physical Exam  Vitals reviewed  Constitutional:       General: She is not in acute distress  Appearance: She is obese  She is not ill-appearing, toxic-appearing or diaphoretic  HENT:      Nose: No congestion  Eyes:      General: No scleral icterus  Right eye: No discharge  Left eye: No discharge  Conjunctiva/sclera: Conjunctivae normal    Cardiovascular:      Rate and Rhythm: Normal rate and regular rhythm  Heart sounds: No murmur heard  Pulmonary:      Effort: No respiratory distress  Breath sounds: No stridor  No wheezing or rhonchi  Comments: Bibasilar crackles noted  No dyspnea or tachypnea noted  Not requiring oxygen  Abdominal:      General: There is no distension  Palpations: Abdomen is soft  Tenderness: There is no abdominal tenderness  There is no guarding  Musculoskeletal:      Right lower leg: Edema present  Left lower leg: Edema present  Comments: Significant bilateral lower extremity edema noted   Skin:     General: Skin is warm and dry  Coloration: Skin is not jaundiced or pale  Findings: No bruising, erythema, lesion or rash  Neurological:      Mental Status: She is alert  Comments: Awake alert interactive, no overt confusion  No abnormal movements   Psychiatric:      Comments: Flat affect        Discussion with Family: Updated  (daughter) via phone  Discharge instructions/Information to patient and family:   See after visit summary for information provided to patient and family        Provisions for Follow-Up Care:  See after visit summary for information related to follow-up care and any pertinent home health orders  Disposition:   Home    Planned Readmission: no     Discharge Statement:  I spent 45 minutes discharging the patient  This time was spent on the day of discharge  I had direct contact with the patient on the day of discharge  Greater than 50% of the total time was spent examining patient, answering all patient questions, arranging and discussing plan of care with patient as well as directly providing post-discharge instructions  Additional time then spent on discharge activities  Discharge Medications:  See after visit summary for reconciled discharge medications provided to patient and/or family        **Please Note: This note may have been constructed using a voice recognition system**

## 2023-02-26 NOTE — H&P
[facility] Note Nu Huizar 1945, 76 y o  female MRN: [id number]  Unit/Bed#: W -01 Encounter: [id number]  Primary Care Provider: NANY Ayala   Date and time admitted to hospital: 2/23/2023  2:08 PM    * SADAF (acute kidney injury) [facility])  Assessment & Plan  · Based on patient's EGFR, patient likely has chronic kidney disease stage IIIa  · Patient's baseline creatinine is 0 99  Presently 1 6  · Likely prerenal with patient having diarrhea for several days now  Patient is clinically dry on examination  · Continue IV fluids that was started in the emergency room  · Monitor BMP  · Avoid nephrotoxins  Avoid hypotension  · Hold off lisinopril in the meantime  · Hold off chlorthalidone that was just prescribed recently  According to the patient's daughter, patient is not on hydrochlorothiazide anymore  · Monitor input and output  · For further evaluation and management if worsens or no improvement  Diarrhea  Assessment & Plan  · Patient has been having diarrhea since last Sunday  Presently stopped as patient took a dose of Questran today  With history of IBS  · Patient had recent course of antibiotic treatment for UTI approximately 3 weeks ago  · Stools for bacterial enteric PCR as well as C  difficile PCR/EIA  · Contact precautions with hand hygiene in the meantime  · Hold off on Questran for now  · IV fluids  · Monitor stool output  Confusion with body shakes and blank stare  Assessment & Plan  · According to the patient's daughter, who is one of our [facility] primary care physicians, patient had been having occasional confusion, body shakes and blank stare  · When I saw the patient, patient was completely coherent, oriented x3, and conversant  No confusion at the time  Neurological examination was essentially normal   · CT scan of the head: No acute findings    · History of stroke in the past   · Questionable delirium versus possibility of a seizure  Possible due to dehydration as patient is clinically dry and had been having diarrhea this past several days and confusion had resolved at this point with IV fluid hydration  · Neurology consult  · Monitor mental status  · With polypharmacy, I also consulted geriatric physician  As per discussion with patient's daughter, patient takes oxycodone and gabapentin on as-needed basis for her back pains  She told me, that her mom only usually takes once a day of these medications  Patient also on muscle relaxant as needed  Initial medication list included both Lexapro and Terressa Ashley, which is an SSRI and an SNRI and thus this was discussed with the patient's daughter and was told that patient is not on Lexapro anymore  Patient is also on Nelly Roundup and Edis which is an anticholinergic medication and can cause confusion  Patient's medications may need to be reviewed extensively and possibly be updated  Bilateral lower extremity edema  Assessment & Plan  · Patient has history of chronic bilateral lower extremity edema, but had been worsening lately  According to the patient's daughter, who is a doctor, no history of congestive heart failure  · Check duplex scan to rule out DVTs  · For further evaluation and management if duplex scan is negative  · Patient was just prescribed with chlorthalidone recently and took 1 tablet  However, we will hold off in the meantime as patient actually was found to be clinically dry with patient having diarrhea and with acute kidney injury  According to the patient's daughter, patient is not on hydrochlorothiazide anymore  Normocytic anemia  Assessment & Plan  · Patient had anemia since July 2022  · Presently normocytic with normal RDW  · Stable as compared to previous levels since July 2022  · According to the patient, at times she would have occasional hemorrhoidal bleeding  But no active bleeding at this point  · Monitor    · For further evaluation and management if this worsens significantly  Type 2 diabetes mellitus ([facility] [address] )  Assessment & Plan  Lab Results   Component Value Date    HGBA1C 7 7 (H) 02/20/2023       No results for input(s): POCGLU in the last 72 hours  Blood Sugar Average: Last 72 hrs:    · Hold off patient's metformin while in the hospital   · Insulin sliding scale  · Accu-Cheks  · Hypoglycemia protocol  · Adjust treatment accordingly  VTE Pharmacologic Prophylaxis: VTE Score: 4 Moderate Risk (Score 3-4) - Pharmacological DVT Prophylaxis Ordered: heparin  Code Status: Level 1 - Full Code   Discussion with family: Updated  (daughter) at bedside  Anticipated Length of Stay: Patient will be admitted on an inpatient basis with an anticipated length of stay of greater than 2 midnights secondary to Above findings and plans       Total Time Spent on Date of Encounter in care of patient: 75 minutes This time was spent on one or more of the following: performing physical exam; counseling and coordination of care; obtaining or reviewing history; documenting in the medical record; reviewing/ordering tests, medications or procedures; communicating with other healthcare professionals and discussing with patient's family/caregivers  Chief Complaint: Confusion  History of Present Illness: Juan Carlos Cuadra is a 76 y o  female with a PMH significant for anemia, chronic opiate dependence, chronic pain with chronic back pains, diabetes mellitus, IBS, stroke, urinary tract infection who presents with confusion  During my interview with the patient, she allowed her daughter, who is one of our [facility] primary care physicians, to tell her HPI  According to the patient's daughter, recently, her mother was noted to have occasional confusion and at times would have whole body shakes and blank stares  Approximately 3 weeks ago, patient was treated with antibiotic for urinary tract infection    A few days ago (last Sunday), patient's urine was noted to be foul-smelling and thus a urinalysis was done, which was not impressive for a UTI, but patient was prescribed with doxycycline  Patient did not actually take the doxycycline antibiotic as she felt that there might be an interaction with the antibiotic medication and her water pill and that she was also having diarrhea  According to the patient, she started having diarrhea also last Sunday, with at times watery stools and at times soft stools  Patient admits that at times she would have some blood-tinged stools due to her history of hemorrhoids  Today, patient took a dose of Questran and thus patient's diarrhea had stopped  According to the patient's daughter, patient also had not been eating and drinking well  When asked, no actual fever, but according to the patient, she feels cold at times and according to the patient's daughter, would have shakes, like somebody with chills  Patient's daughter also told me, that her mom was noted to have worsening bilateral lower extremity edema  Thus, patient's primary care provider, started the patient on chlorthalidone and patient took a tablet of this and that her previous prescription of hydrochlorothiazide was discontinued  Patient denied any headaches or any loss of consciousness  Patient denied any cough or colds or any nausea or vomiting or any abdominal pains  She denied any chest pains or shortness of breath  Patient told me, that she had an episode of dizziness approximately 2 weeks ago, but this has resolved  Patient has history of dizziness based on patient's past medical history and her being on meclizine as needed  Patient denies any other symptoms other than ones mentioned above  Review of Systems:  Review of Systems     10 point review systems done and they were negative except for the ones I mentioned in my history of present illness  No focal weakness or numbness  Please see notes above      Past Medical and Surgical History:   Past Medical History:   Diagnosis Date   • Acid reflux    • Anemia     hx of iron-deficient   • Anxiety    • Arthritis    • Asthma     last needed inhaler last year 2020   • Basal cell carcinoma     upper lip   • Chronic narcotic dependence (HCC)    • Chronic pain    • Colon polyp    • Cystocele    • Diabetes mellitus ([address] )     stable   • Disease of thyroid gland     hypothyroidism   • Diverticulosis    • Dizziness     at times   • Dysfunctional uterine bleeding     last assessed - [id number]   • Dysphagia    • Fibromyalgia    • Gastric ulcer    • Gastroparesis    • History of colonic polyps     last assessed - [id number]   • History of gastroesophageal reflux (GERD)    • Hypercholesterolemia    • Hyperlipidemia    • Hypertension    • IBS (irritable bowel syndrome)    • Pneumobilia 06/18/2022   • Post laminectomy syndrome    • S/P insertion of spinal cord stimulator 07/18/2018   • Seasonal allergies    • Shortness of breath     exertional   • Spinal stenosis    • Status post lumbar spinal fusion 03/16/2018   • Stroke [facility])     pt states slight stroke March 2022       Past Surgical History:   Procedure Laterality Date   • APPENDECTOMY     • BACK SURGERY     • BREAST CYST EXCISION Left    • CHOLECYSTECTOMY     • COLONOSCOPY     • ESOPHAGOGASTRODUODENOSCOPY N/A 09/28/2016    Procedure: ESOPHAGOGASTRODUODENOSCOPY (EGD); Surgeon: Sarah Amado MD;  [facility];   Service:    • HERNIA REPAIR     • HYSTERECTOMY      TTAH-BSO age 25   • LAMINECTOMY     • LUMBAR LAMINECTOMY     • OOPHORECTOMY      age 25   • CT ARTHRODESIS POSTERIOR/PSTLAT TQ 1NTRSPC THORACIC N/A 06/04/2018    Procedure: Reopening of lumbar incision for T12-L5 posterior instrumented fixation and fusion and T12-L4 posterior decompression;  Surgeon: Je Quiroga MD;  [facility]: [facility];  Service: Neurosurgery   • CT COLONOSCOPY FLX DX W/COLLJ SPEC WHEN PFRMD N/A 03/02/2016    Procedure: EGD AND COLONOSCOPY;  Surgeon: Sarah Amado MD; Location: [facility]; Service: Gastroenterology   • OR DILATION [facility] [facility] UNGUIDED SOUND/BOUGIE 1/MULT PASS N/A 09/28/2016    Procedure: DILATATION ESOPHAGEAL;  Surgeon: Ajay Ham MD;  [facility]: [facility]; Service: Gastroenterology   • OR ESOPHAGOGASTRODUODENOSCOPY TRANSORAL DIAGNOSTIC N/A 07/18/2016    Procedure: ESOPHAGOGASTRODUODENOSCOPY (EGD); Surgeon: Ajay Ham MD;  [facility]: [facility]; Service: Gastroenterology   • OR INSJ/RPLCMT SPI NPGR DIR/INDUXIVE COUPLING Left 06/04/2018    Procedure: removal of left buttock implantable pulse generator and placement of new  implantable pulse generator;  Surgeon: Vivian Sinha MD;  [facility];  Service: Neurosurgery       Meds/Allergies:  Prior to Admission medications    Medication Sig Start Date End Date Taking? Authorizing Provider   doxycycline hyclate (VIBRAMYCIN) 100 mg capsule Take 1 capsule (100 mg total) by mouth every 12 (twelve) hours for 10 days 2/22/23 3/4/23  Garrie Blue CRNP   gabapentin (NEURONTIN) 300 mg capsule Take 1 capsule (300 mg total) by mouth 3 (three) times a day 10/24/22   Garrie Blue CRNP   metoprolol tartrate (LOPRESSOR) 25 mg tablet Take 0 5 tablets (12 5 mg total) by mouth every 12 (twelve) hours 1/4/23   Garrie Blue CRNP   albuterol (PROVENTIL HFA,VENTOLIN HFA) 90 mcg/act inhaler Inhale 2 puffs every 6 (six) hours as needed for wheezing or shortness of breath 6/29/22   Garrie Blue CRNP   aspirin 81 mg chewable tablet Chew 1 tablet (81 mg total) daily 8/9/22 10/13/22  Garrie Blue CRNP   atorvastatin (LIPITOR) 40 mg tablet TAKE ONE TABLET BY MOUTH ONCE DAILY 11/22/22   Garrie Blue CRNP   baclofen 10 mg tablet Take one tablet by mouth three times a day as needed for muscle spasms 8/9/22   Garrie Blue CRNP   Blood Glucose Monitoring Suppl (OneTouch Verio Reflect) w/Device KIT Check blood sugars twice daily  Please substitute with appropriate alternative as covered by patient's insurance   Dx: E11 65 2/20/23   NANY Martins   chlorthalidone (HYGROTEN) 15 MG tablet Take 1 5 tablets (22 5 mg total) by mouth daily 2/17/23   NANY Martins   cholestyramine Murriel Bora) 4 g packet Take 1 packet (4 g total) by mouth 3 (three) times a day with meals 10/27/22   [facility] Korina Baires,    escitalopram (Lexapro) 20 mg tablet Take 0 5 tablets (10 mg total) by mouth daily 6/29/22   NANY Martins   glucose blood (OneTouch Verio) test strip Check blood sugars twice daily  Please substitute with appropriate alternative as covered by patient's insurance  Dx: E11 65 2/20/23   NANY Martins   hydrochlorothiazide (HYDRODIURIL) 12 5 mg tablet Take 2 tablets (25 mg total) by mouth daily as needed (edema/swelling) 1/24/23   NANY Martins   latanoprost (XALATAN) 0 005 % ophthalmic solution Administer 1 drop to both eyes daily at bedtime 10/2/20 6/29/22  [facility] Baires, DO   levothyroxine 25 mcg tablet Take 1 5 tablets (37 5 mcg total) by mouth daily 8/9/22 10/13/22  NANY Martins   lisinopril (ZESTRIL) 20 mg tablet TAKE ONE TABLET BY MOUTH ONCE DAILY 2/16/23   NANY Martins   meclizine (ANTIVERT) 25 mg tablet Take 1 tablet (25 mg total) by mouth 4 (four) times a day as needed for dizziness 6/21/22 10/13/22  Deonte Faustin MD   metFORMIN (GLUCOPHAGE) 1000 MG tablet Take 1 tablet (1,000 mg total) by mouth 2 (two) times a day with meals 2/10/23   NANY Martins   metoclopramide (REGLAN) 10 mg tablet Take 1 tablet (10 mg total) by mouth 4 (four) times a day as needed (Nausea and vomiting) 9/9/22   NANY Martins   Milnacipran HCl (Savella) 100 MG TABS Take 1 tablet (100 mg total) by mouth daily 9/13/21   Ritu Baires,    nystatin (MYCOSTATIN) powder Apply topically 2 (two) times a day 6/21/22   Marval January, MD   OneTouch Delica Lancets 16K MISC Check blood sugars twice daily   Please substitute with appropriate alternative as covered by patient's insurance  Dx: E11 65 2/20/23   NANY Brar   oxyCODONE-acetaminophen (PERCOCET)  mg per tablet Take 1 tablet by mouth every 6 (six) hours as needed for severe pain Max Daily Amount: 4 tablets 1/24/23   NANY Brar   pantoprazole (PROTONIX) 40 mg tablet Take 1 tablet (40 mg total) by mouth every 12 (twelve) hours 7/18/22 10/16/22  Reese Peña MD   prednisoLONE (ORAPRED) 15 mg/5 mL oral solution 3 tsp twice/day for 2 days, 2 tsp twice/day for 2 days, 1 tsp twice/day x2 days than 1/2 tsp daily x 2 days 12/22/22   NANY Brar   saccharomyces boulardii (FLORASTOR) 250 mg capsule Take 1 capsule (250 mg total) by mouth 2 (two) times a day 6/21/22   Mary Ward MD   trospium chloride (SANCTURA) 20 mg tablet Take 1 tablet (20 mg total) by mouth 2 (two) times a day 9/28/22   NANY Brar   zolpidem (AMBIEN) 10 mg tablet Take 1 tablet (10 mg total) by mouth daily at bedtime as needed for sleep 1/18/23   NANY Brar   sucralfate (CARAFATE) 1 g tablet Take 1 tablet (1 g total) by mouth 4 (four) times a day 7/18/22 10/13/22  Silvana Recio MD     I have reviewed home medications using recent Epic encounter  Medication list was reviewed with the patient's daughter  Patient is not on Lexapro anymore  Patient only takes gabapentin at most once a day, thus patient's daughter told me to just ordered the gabapentin once a day as needed  Patient is not on hydrochlorothiazide anymore  Allergies:    Allergies   Allergen Reactions   • Penicillins Anaphylaxis, Hives and Other (See Comments)     Other reaction(s): Unknown Reaction   • Sulfa Antibiotics Anaphylaxis and Other (See Comments)     Other reaction(s): Unknown Reaction   • Aspartame - Food Allergy Other (See Comments) and Hypertension     Slurred speech, weakness, stroke sx   • Iodinated Contrast Media Hives     Pt has taken prep prior for contrast and has not had any break through reaction   • Keflex [Cephalexin] Hives Social History:  Marital Status:    Substance Use History:   Social History     Substance and Sexual Activity   Alcohol Use Not Currently    Comment: Denied history of alcohol use     Social History     Tobacco Use   Smoking Status Former   • Types: Cigarettes   • Quit date: 1970   • Years since quittin 1   Smokeless Tobacco Never   Tobacco Comments    Denied history of current ever day smoker, Former smoker and Never smoker all documented in [employer]     Social History     Substance and Sexual Activity   Drug Use No    Comment: Denied history of drug use       Family History:  Family History   Problem Relation Age of Onset   • Lung cancer Mother 55   • Pulmonary embolism Father    • No Known Problems Sister    • No Known Problems Daughter    • No Known Problems Daughter    • Stroke Maternal Grandmother    • Heart attack Maternal Grandfather    • No Known Problems Paternal Grandmother    • No Known Problems Paternal Grandfather    • No Known Problems Maternal Aunt    • Diabetes Family         Diabetes mellitus   • Hypertension Family    • Stroke Family         Stroke complications       Physical Exam:     Vitals:   Blood Pressure: 167/84 (23)  Pulse: 91 (23)  Temperature: 98 8 °F (37 1 °C) (23)  Temp Source: Oral (23 1427)  Respirations: 18 (23)  Height: 5' (152 4 cm) (23)  Weight - Scale: 86 8 kg (191 lb 5 8 oz) (23)  SpO2: 96 % (23)    Physical Exam  Vitals and nursing note reviewed  Exam conducted with a chaperone present  Constitutional:       General: She is not in acute distress  Appearance: She is ill-appearing  She is not toxic-appearing or diaphoretic  HENT:      Mouth/Throat:      Mouth: Mucous membranes are dry  Pharynx: Oropharynx is clear  No oropharyngeal exudate or posterior oropharyngeal erythema  Eyes:      General: No scleral icterus  Right eye: No discharge           Left eye: No discharge  Extraocular Movements: Extraocular movements intact  Conjunctiva/sclera: Conjunctivae normal       Pupils: Pupils are equal, round, and reactive to light  Cardiovascular:      Rate and Rhythm: Normal rate and regular rhythm  Heart sounds: Normal heart sounds  No murmur heard  No friction rub  No gallop  Pulmonary:      Effort: Pulmonary effort is normal  No respiratory distress  Breath sounds: Normal breath sounds  No stridor  No wheezing, rhonchi or rales  Abdominal:      General: Bowel sounds are normal  There is no distension  Palpations: Abdomen is soft  Tenderness: There is no abdominal tenderness  There is no guarding or rebound  Musculoskeletal:      Right lower leg: No edema  Left lower leg: No edema  Skin:     General: Skin is warm and dry  Coloration: Skin is not pale  Findings: No erythema or rash  Neurological:      General: No focal deficit present  Mental Status: She is alert and oriented to person, place, and time  Mental status is at baseline  Cranial Nerves: No cranial nerve deficit  Sensory: No sensory deficit  Motor: No weakness  Comments: Patient was able to tell me where she is and was very specific, telling me the hospital, the building floor and room number  She was able to tell me her birthdate and the names of her children  She was also able to tell me that the president of the [facility] is Mr Aline Gee, although she told me that her favorite president was Tahira Abraham  All the information that she told me were verified from the patient's daughter at bedside to be right  Patient was coherent and able to answer questions appropriately  Patient was conversant at the time  No tremors noted  Psychiatric:         Mood and Affect: Mood normal          Behavior: Behavior normal          Thought Content:  Thought content normal               Additional Data:     Lab Results:  Results from last 7 days   Lab Units 02/23/23  1533   WBC Thousand/uL 6 83   HEMOGLOBIN g/dL 10 0*   HEMATOCRIT % 32 8*   PLATELETS Thousands/uL 222   NEUTROS PCT % 61   LYMPHS PCT % 23   MONOS PCT % 11   EOS PCT % 4     Results from last 7 days   Lab Units 02/23/23  1533   SODIUM mmol/L 138   POTASSIUM mmol/L 5 0   CHLORIDE mmol/L 103   CO2 mmol/L 28   BUN mg/dL 40*   CREATININE mg/dL 1 68*   ANION GAP mmol/L 7   CALCIUM mg/dL 9 7   ALBUMIN g/dL 3 7   TOTAL BILIRUBIN mg/dL 0 39   ALK PHOS U/L 89   ALT U/L 9   AST U/L 16   GLUCOSE RANDOM mg/dL 99     Results from last 7 days   Lab Units 02/23/23  1533   INR  0 93         Results from last 7 days   Lab Units 02/20/23  0958   HEMOGLOBIN A1C % 7 7*     Results from last 7 days   Lab Units 02/23/23  1533   LACTIC ACID mmol/L 1 3       Lines/Drains:  Invasive Devices     Peripheral Intravenous Line  Duration           Peripheral IV 02/23/23 Left Antecubital <1 day                    Imaging: Reviewed radiology reports from this admission including: chest xray and CT head  XR chest 1 view portable   Final Result by Keyonna Estrada MD (02/23 1536)      No acute cardiopulmonary disease                    Workstation performed: [id number]         CT head without contrast   ED Interpretation by Noemí Fritz PA-C (02/23 1635)   CT head without contrast  Status: Final result    PACS Images     Show images for CT head without contrast  Study Result    Wet Read  CT head without contrast   Status: Final result     PACS Images      Show images for CT head without contrast   Study Result     Narrative & Impression   CT BRAIN - WITHOUT CONTRAST       INDICATION:   ams        COMPARISON:  7/25/2022       TECHNIQUE:  CT examination of the brain was performed   In addition to axial images, sagittal and coronal 2D reformatted images were created and submitted for interpretation        Radiation dose length product (DLP) for this visit:  893 mGy-cm    This examination, like all CT scans performed in the [facility], was performed utilizing techniques to minimize radiation dose exposure, including the use of iterative   reconstruction and automated exposure control          IMAGE QUALITY:  Diagnostic        FINDINGS:       PARENCHYMA: Decreased attenuation is noted in periventricular and subcortical white    matter demonstrating an appearance that is statistically most likely to represent mild microangiopathic change        No CT signs of acute infarction   No   intracranial mass, mass effect or midline shift   No acute parenchymal hemorrhage        VENTRICLES AND EXTRA-AXIAL SPACES:  Normal for the patient's age        VISUALIZED ORBITS: Normal visualized orbits        PARANASAL SINUSES: Normal visualized paranasal sinuses        CALVARIUM AND EXTRACRANIAL SOFT TISSUES:  Normal        IMPRESSION:       No acute intracranial abnormality                        Workstation performed: [id number]         Imaging     CT head without contrast (Order: [id number]) - 2/23/2023     Result History     CT head without contrast (Order #[id number]) on 2/23/2023 - Order Result History Report     Order Report     Order Details   Order Questions     Question Answer   What is the patient's sedation requirement?  No Sedation   Release to patient through Mychart Immediate   Exam reason ams   Note: Enter reason    for exam   Decision Support Exception Emergency Medical Condition (MA)           Result Information     Status Priority Source   Final result (2/23/2023 1555) STAT     Other Results from 2/23/20 23     UA w Reflex to Microscopic w Reflex to Culture  In process 2/23/2023   Blood culture #2  In process 2/23/2023   CBC and differential  Final result 2/23/2023   Protime-INR  In process 2/23/2023   APTT  In process 2/23/2023   Blood culture #1  In process 2/23/2023   Comprehensive metabolic panel  Final result 2/23/2023   Lactic acid  Final result 2/23/2023   HS Troponin 0hr (reflex protocol)  Final result 2/23/2023   TSH  Final result 2/23/2023   Magnesium  Final result 2/23/2023    important suggestion  Warning: Additional results from 2/23/2023 are available but are not displayed in this report  Reason for Exam     ams         CT head without contrast: Patient Communication     Add Comments  Not seen         Signed by     Signed Date/Time Phone Pager   Ming Herbert 2/23/20 23 15:55 [phone]       Exam Information     Status Exam Begun Exam Ended Performing Tech   Final [99] 2/23/2023 14:56 2/23/2023 15:08 8 Suhaildavid Delarosamichi       Screening Form Questions     No questions ha   ve been answered for this form  External Results Report       Open External Results Report     Encounter       View Encounter                   Patient Care Timeline     No data selected in time range     Pre-op Summary       Pre-op             Recovery Summary       Recovery               Routing History     None        Interpreted by Lance Keenan PA-C on 2/23/2023  4:35 PM  Narrative & Impression  CT BRAIN - WITHOUT CONTRAST     INDICATION:   ams      COMPARISON:  7/25/2022     TECHNIQUE:  CT examination of the brain was performed  In addition to axial images, sagittal and coronal 2D reformatted images were created and submitted for interpretation      Radiation dose length product (DLP) for this visit:  972 mGy-cm   This examination, like all CT scans perform   ed in the [facility], was performed utilizing techniques to minimize radiation dose exposure, including the use of iterative   reconstruction and automated exposure control        IMAGE QUALITY:  Diagnostic      FINDINGS:     PARENCHYMA: Decreased attenuation is noted in periventricular and subcortical white matter demonstrating an appearance that is statistically most likely to represent mild microangiopathic change      No CT signs of acute infarction  No intracranial mass, mass effect or midline shift    No acute parenchymal hemorrhage      VENTRICLES AND EXTRA-AXIAL SPACES:  Normal for the patient's age      VISUALIZED ORBITS: Normal visualized orbits      PARANASAL SINUSES: Normal visualized paranasal sinuses      CALVARIUM AND EXTRACRANIAL SOFT TISSUES:  Normal      IMPRESSION:     No acute intracranial abnormality                  Workstation performed: [id number]       Imaging    CT head without contrast (Order: [id number]) - 2/23/2023    Result History    CT he   ad without contrast (Order #[id number]) on 2/23/2023 - Order Result History Report    Order Report    Order Details  Order Questions    Question Answer  What is the patient's sedation requirement? No Sedation  Release to patient through Mychart Immediate  Exam reason ams  Note: Enter reason for exam  Decision Support Exception Emergency Medical Condition (MA)         Result Information    Status Priority Source  Final result (2/23/2023 1634) STAT     Other Results from 2/23/2023     UA w Reflex to Microscopic w Reflex to Culture  In process 2/23/2023   Blood culture #2  In process 2/23/2023   CBC and differential  Final result 2/23/2023   Protime-INR  In process 2/23/2023   APTT  In process 2/23/2023   Blood culture #1  In process 2/23/2023   Comprehensive metabolic panel  Final result 2/23/2023   Lactic acid  Final result 2/23/2023   HS Troponin 0hr (reflex protocol)  Final result 2/23/2023   TSH  Final result 2/23/2023   Magnesium  Final result 2/23/2023   important suggestion  Sherry salgado: Additional results from 2/23/2023 are available but are not displayed in this report        Reason for Exam    ams        CT head without contrast: Patient Communication    Add Comments  Not seen        Signed by    Signed Date/Time  Phone Pager  Leonora Mariee 2/23/2023 15:55 [phone]       Exam Information    Status Exam Begun  Exam Ended  Performing Tech  Final [99] 2/23/2023 14:56 2/23/2023 15:08 [address]      Screening Form Questions    No questions have been answered for this form  External Results Report      Open External Results Report     Encounter      View Encounter                 Patient Care Timeline    No data selected in time range    Pre-op Summary      Pre-op            Recovery Summary      Recovery             Routing History    None            Final Result by Lance Simeon MD (02/23 1555)      No acute intracranial abnormality  Workstation performed: [id number]         VAS lower limb venous duplex study, complete bilateral    (Results Pending)       EKG and Other Studies Reviewed on Admission:   · EKG: Normal sinus rhythm, left axis deviation, LBBB  When compared to previous EKG, patient has chronic LBBB       ** Please Note: This note has been constructed using a voice recognition system   **

## 2023-02-27 ENCOUNTER — TRANSITIONAL CARE MANAGEMENT (OUTPATIENT)
Dept: FAMILY MEDICINE CLINIC | Facility: CLINIC | Age: 78
End: 2023-02-27

## 2023-02-27 NOTE — UTILIZATION REVIEW
NOTIFICATION OF ADMISSION DISCHARGE   This is a Notification of Discharge from [address]  Please be advised that this patient has been discharge from our facility  Below you will find the admission and discharge date and time including the patient’s disposition  UTILIZATION REVIEW CONTACT:  Rosario Rand MA  Utilization   Network Utilization Review Department  Phone: [phone] x carefully listen to the prompts  All voicemails are confidential   Email: [email or web address]     ADMISSION INFORMATION  PRESENTATION DATE: 2/23/2023  2:08 PM  OBERVATION ADMISSION DATE:   INPATIENT ADMISSION DATE: 2/23/23  6:12 PM   DISCHARGE DATE: 2/26/2023 12:55 PM   DISPOSITION:Home/Self Care    IMPORTANT INFORMATION:  Send all requests for admission clinical reviews, approved or denied determinations and any other requests to dedicated fax number below belonging to the campus where the patient is receiving treatment   List of dedicated fax numbers:  [address] DENIALS (Administrative/Medical Necessity) [phone]   [address] (Maternity/NICU/Pediatrics) [phone]   [facility] [phone]   [place] [address] [phone]   [address] [phone]   220 [facility] [phone] 90 [facility] [phone]   [address] [place], [place] [address] [phone]   [facility]  [phone] 4058 [facility] [phone]   412 [facility] [address] [facility] [phone]

## 2023-02-28 ENCOUNTER — PATIENT OUTREACH (OUTPATIENT)
Dept: FAMILY MEDICINE CLINIC | Facility: CLINIC | Age: 78
End: 2023-02-28

## 2023-02-28 DIAGNOSIS — M54.50 LUMBAR BACK PAIN: ICD-10-CM

## 2023-02-28 DIAGNOSIS — M96.1 POST LAMINECTOMY SYNDROME: ICD-10-CM

## 2023-02-28 DIAGNOSIS — Z71.89 COMPLEX CARE COORDINATION: Primary | ICD-10-CM

## 2023-02-28 LAB
BACTERIA BLD CULT: NORMAL
BACTERIA BLD CULT: NORMAL

## 2023-02-28 RX ORDER — OXYCODONE AND ACETAMINOPHEN 10; 325 MG/1; MG/1
1 TABLET ORAL EVERY 6 HOURS PRN
Qty: 120 TABLET | Refills: 0 | Status: SHIPPED | OUTPATIENT
Start: 2023-02-28

## 2023-02-28 NOTE — PROGRESS NOTES
Spoke with patients daughter Ayse Wheat  Family will be providing transportation to outpatient PT for Demond Benson  They also have a family friend to come to the house to do PT with her  She has PCP appointment set up for 3/8/23  Ayse Wheat does her medications She has meals on wheels  No further needs at this time

## 2023-03-05 DIAGNOSIS — N17.9 ACUTE KIDNEY INJURY (HCC): Primary | ICD-10-CM

## 2023-03-05 DIAGNOSIS — M79.7 FIBROMYALGIA: ICD-10-CM

## 2023-03-05 RX ORDER — MILNACIPRAN HYDROCHLORIDE 100 MG/1
1 TABLET, FILM COATED ORAL DAILY
Qty: 30 TABLET | Refills: 3 | Status: SHIPPED | OUTPATIENT
Start: 2023-03-05

## 2023-03-08 ENCOUNTER — OFFICE VISIT (OUTPATIENT)
Dept: FAMILY MEDICINE CLINIC | Facility: CLINIC | Age: 78
End: 2023-03-08

## 2023-03-08 ENCOUNTER — APPOINTMENT (OUTPATIENT)
Dept: LAB | Facility: CLINIC | Age: 78
End: 2023-03-08

## 2023-03-08 VITALS
BODY MASS INDEX: 35.53 KG/M2 | SYSTOLIC BLOOD PRESSURE: 112 MMHG | TEMPERATURE: 97.7 F | OXYGEN SATURATION: 98 % | DIASTOLIC BLOOD PRESSURE: 78 MMHG | HEART RATE: 94 BPM | WEIGHT: 181 LBS | HEIGHT: 60 IN

## 2023-03-08 DIAGNOSIS — N17.9 ACUTE KIDNEY INJURY (HCC): ICD-10-CM

## 2023-03-08 DIAGNOSIS — R32 URINARY INCONTINENCE, UNSPECIFIED TYPE: ICD-10-CM

## 2023-03-08 DIAGNOSIS — J44.9 CHRONIC OBSTRUCTIVE PULMONARY DISEASE, UNSPECIFIED COPD TYPE (HCC): ICD-10-CM

## 2023-03-08 DIAGNOSIS — R41.82 ALTERED MENTAL STATUS, UNSPECIFIED ALTERED MENTAL STATUS TYPE: Primary | ICD-10-CM

## 2023-03-08 DIAGNOSIS — I63.9 CEREBROVASCULAR ACCIDENT (CVA), UNSPECIFIED MECHANISM (HCC): ICD-10-CM

## 2023-03-08 DIAGNOSIS — E66.01 OBESITY, MORBID (HCC): ICD-10-CM

## 2023-03-08 DIAGNOSIS — E11.65 TYPE 2 DIABETES MELLITUS WITH HYPERGLYCEMIA, WITHOUT LONG-TERM CURRENT USE OF INSULIN (HCC): ICD-10-CM

## 2023-03-08 DIAGNOSIS — Z09 HOSPITAL DISCHARGE FOLLOW-UP: ICD-10-CM

## 2023-03-08 DIAGNOSIS — F51.01 PRIMARY INSOMNIA: ICD-10-CM

## 2023-03-08 LAB
BUN SERPL-MCNC: 27 MG/DL (ref 5–25)
CREAT SERPL-MCNC: 1.34 MG/DL (ref 0.6–1.3)
GFR SERPL CREATININE-BSD FRML MDRD: 37 ML/MIN/1.73SQ M

## 2023-03-08 RX ORDER — TROSPIUM CHLORIDE ER 60 MG/1
60 CAPSULE ORAL
Qty: 30 CAPSULE | Refills: 3 | Status: SHIPPED | OUTPATIENT
Start: 2023-03-08

## 2023-03-08 RX ORDER — TROSPIUM CHLORIDE 20 MG/1
20 TABLET, FILM COATED ORAL 2 TIMES DAILY
Qty: 90 TABLET | Refills: 3 | Status: CANCELLED | OUTPATIENT
Start: 2023-03-08

## 2023-03-08 RX ORDER — ZOLPIDEM TARTRATE 5 MG/1
5 TABLET ORAL
Qty: 30 TABLET | Refills: 0 | Status: SHIPPED | OUTPATIENT
Start: 2023-03-08

## 2023-03-08 NOTE — PROGRESS NOTES
TCM Call     Date and time call was made  2023  2:36 PM    Hospital care reviewed  Discussed with Inpatient Physician    Patient was hospitialized at  [facility]    Date of Admission  23    Date of discharge  23    Diagnosis  Diarrhea    Disposition  Home    Were the patients medications reviewed and updated  No    Current Symptoms  None    Fatigue severity  Moderate      TCM Call     Post hospital issues  Reduced activity    Should patient be enrolled in anticoag monitoring? No    Scheduled for follow up? Yes    Patients specialists  Neurologist    Neurologist name  not scheduled    Neurologist contact #  [phone]     Referrals needed  no    Did you obtain your prescribed medications  Yes    Do you need help managing your prescriptions or medications  No    Is transportation to your appointment needed  No    Specify why  Friend or daughter will bring patient to offie visit    I have advised the patient to call PCP with any new or worsening symptoms  Yasmine Gama, [address] [place] [address]; Family; [place]-based    The type of support provided  Physical; Emotional    Do you have social support  Yes, as much as I need    Are you recieving any outpatient services  No    What type of services  Physical Therapy     Are you recieving home care services  No    Are you using any community resources  Yes    Which resources are you recieving  [facility]     Current waiver services  No    Have you fallen in the last 12 months  Yes    How many times  2    Interperter language line needed  No    Counseling  Patient    Counseling topics  instructions for management        Name: La Mina      : 1945      MRN: [id number]  Encounter Provider: NANY Rashid  Encounter Date: 3/8/2023   Encounter department: [address] [place] [address]     1   Altered mental status, unspecified altered mental status type    2  Hospital discharge follow-up    3  Urinary incontinence, unspecified type  -     trospium (SANCTURA XR) 60 mg 24 hr capsule; Take 1 capsule (60 mg total) by mouth daily before breakfast    4  Primary insomnia  -     zolpidem (AMBIEN) 5 mg tablet; Take 1 tablet (5 mg total) by mouth daily at bedtime as needed for sleep    5  Chronic obstructive pulmonary disease, unspecified COPD type ([facility] )    6  Obesity, morbid ([facility] )    7  Cerebrovascular accident (CVA), unspecified [facility] )    8  Type 2 diabetes mellitus with hyperglycemia, without long-term current use of insulin ([facility])         Subjective      Chief complaint: 76 y  o female presenting for hospital follow up for altered mental status      HMI: Patient was admitted on 02/23/2023 to [facility] for diarrhea, dehydration and acute kidney injury  Patient discharged on 02/26/2023 with the diagnoses of diarrhea, bilateral lower extremity edema, normocyctic anemia, confusion with body shakes and blank stare, and type 2 diabetes mellitus  She was having intermittent episodes of diarrhea at home and took a dose of cholestyramine at home which did help with her symptoms  She was evaluated by neurology during the hospital stay due to staring episodes and it was determined that the symptoms is most like due to medications  She was evaluated by occupational and physical therapy with recommendation for rehab but patient choice outpatient therapy and discharge home  She was given IV fluids and lisinopril and chlorthalidone was held  Patient's discharge medications reviewed with her and her daughter  Her daughter has placed the patient's medication in a medication dispensing tray to assist with proper medications being taken  She did have an episode of altered mental status again per her son and daughter, Her zolpidem and gabapentin were held with improvement of symptoms   Patient reports that she is not sleeping well and it is not due to pain, so will restart her on zolpidem at a lower dosage  Patient lives with her son and her two daughters check in her periodically and as needed  Her one daughter is a physician so does attempt to treat health issues at home  Review of Systems   Constitutional: Negative  HENT: Negative  Respiratory: Negative  Cardiovascular: Negative  Gastrointestinal: Negative  Genitourinary: Negative  Musculoskeletal: Negative  Skin: Negative  Neurological: Positive for dizziness and light-headedness  Negative for headaches  Psychiatric/Behavioral: Negative  Current Outpatient Medications on File Prior to Visit   Medication Sig   • albuterol (PROVENTIL HFA,VENTOLIN HFA) 90 mcg/act inhaler Inhale 2 puffs every 6 (six) hours as needed for wheezing or shortness of breath   • atorvastatin (LIPITOR) 40 mg tablet TAKE ONE TABLET BY MOUTH ONCE DAILY   • baclofen 10 mg tablet Take one tablet by mouth three times a day as needed for muscle spasms   • Blood Glucose Monitoring Suppl (OneTouch Verio Reflect) w/Device KIT Check blood sugars twice daily  Please substitute with appropriate alternative as covered by patient's insurance  Dx: E11 65   • chlorthalidone (HYGROTEN) 15 MG tablet Take 1 5 tablets (22 5 mg total) by mouth daily   • escitalopram (Lexapro) 20 mg tablet Take 0 5 tablets (10 mg total) by mouth daily   • gabapentin (NEURONTIN) 300 mg capsule Take 1 capsule (300 mg total) by mouth 3 (three) times a day   • glucose blood (OneTouch Verio) test strip Check blood sugars twice daily  Please substitute with appropriate alternative as covered by patient's insurance   Dx: E11 65   • hydrochlorothiazide (HYDRODIURIL) 12 5 mg tablet Take 2 tablets (25 mg total) by mouth daily as needed (edema/swelling)   • lisinopril (ZESTRIL) 20 mg tablet TAKE ONE TABLET BY MOUTH ONCE DAILY   • metFORMIN (GLUCOPHAGE) 1000 MG tablet Take 1 tablet (1,000 mg total) by mouth 2 (two) times a day with meals   • metoclopramide (REGLAN) 10 mg tablet Take 1 tablet (10 mg total) by mouth 4 (four) times a day as needed (Nausea and vomiting)   • metoprolol tartrate (LOPRESSOR) 25 mg tablet Take 0 5 tablets (12 5 mg total) by mouth every 12 (twelve) hours   • Milnacipran HCl (Savella) 100 MG TABS Take 1 tablet (100 mg total) by mouth daily   • nystatin (MYCOSTATIN) powder Apply topically 2 (two) times a day   • OneTouch Delica Lancets 29U MISC Check blood sugars twice daily  Please substitute with appropriate alternative as covered by patient's insurance   Dx: E11 65   • oxyCODONE-acetaminophen (PERCOCET)  mg per tablet Take 1 tablet by mouth every 6 (six) hours as needed for severe pain Max Daily Amount: 4 tablets   • prednisoLONE (ORAPRED) 15 mg/5 mL oral solution 3 tsp twice/day for 2 days, 2 tsp twice/day for 2 days, 1 tsp twice/day x2 days than 1/2 tsp daily x 2 days   • saccharomyces boulardii (FLORASTOR) 250 mg capsule Take 1 capsule (250 mg total) by mouth 2 (two) times a day   • aspirin 81 mg chewable tablet Chew 1 tablet (81 mg total) daily   • cholestyramine (QUESTRAN) 4 g packet Take 1 packet (4 g total) by mouth 3 (three) times a day with meals (Patient not taking: Reported on 3/8/2023)   • latanoprost (XALATAN) 0 005 % ophthalmic solution Administer 1 drop to both eyes daily at bedtime   • levothyroxine 25 mcg tablet Take 1 5 tablets (37 5 mcg total) by mouth daily   • meclizine (ANTIVERT) 25 mg tablet Take 1 tablet (25 mg total) by mouth 4 (four) times a day as needed for dizziness   • pantoprazole (PROTONIX) 40 mg tablet Take 1 tablet (40 mg total) by mouth every 12 (twelve) hours   • [DISCONTINUED] sucralfate (CARAFATE) 1 g tablet Take 1 tablet (1 g total) by mouth 4 (four) times a day       Objective     /78 (BP Location: Left arm, Patient Position: Sitting, Cuff Size: Adult)   Pulse 94   Temp 97 7 °F (36 5 °C)   Ht 5' (1 524 m)   Wt 82 1 kg (181 lb)   LMP  (LMP Unknown)   SpO2 98%   BMI 35 35 kg/m² (Reviewed)    Physical Exam  Vitals reviewed  Constitutional:       General: She is not in acute distress  Appearance: She is well-developed and well-groomed  She is not ill-appearing  HENT:      Head: Normocephalic and atraumatic  Nose: Nose normal       Mouth/Throat:      Mouth: Mucous membranes are moist    Eyes:      General: Lids are normal       Extraocular Movements: Extraocular movements intact  Conjunctiva/sclera: Conjunctivae normal       Pupils: Pupils are equal, round, and reactive to light  Neck:      Trachea: Trachea and phonation normal    Cardiovascular:      Rate and Rhythm: Normal rate and regular rhythm  Pulses:           Radial pulses are 2+ on the right side and 2+ on the left side  Dorsalis pedis pulses are 2+ on the right side and 2+ on the left side  Posterior tibial pulses are 2+ on the right side and 2+ on the left side  Heart sounds: Normal heart sounds  Pulmonary:      Effort: Pulmonary effort is normal       Breath sounds: Normal breath sounds  Abdominal:      General: Abdomen is flat  Bowel sounds are normal  There is no distension  Palpations: Abdomen is soft  Tenderness: There is no abdominal tenderness  Musculoskeletal:      Cervical back: Neck supple  Right lower leg: Edema present  Left lower leg: Edema present  Skin:     General: Skin is warm and dry  Capillary Refill: Capillary refill takes less than 2 seconds  Neurological:      General: No focal deficit present  Mental Status: She is alert and oriented to person, place, and time  Psychiatric:         Mood and Affect: Mood normal          Behavior: Behavior normal  Behavior is cooperative  Thought Content:  Thought content normal        NANY Palma

## 2023-03-24 DIAGNOSIS — R42 VERTIGO: ICD-10-CM

## 2023-03-26 DIAGNOSIS — R42 VERTIGO: ICD-10-CM

## 2023-03-26 RX ORDER — MECLIZINE HYDROCHLORIDE 25 MG/1
25 TABLET ORAL 4 TIMES DAILY PRN
Qty: 60 TABLET | Refills: 0 | Status: SHIPPED | OUTPATIENT
Start: 2023-03-26 | End: 2023-05-05

## 2023-03-27 DIAGNOSIS — M54.50 LUMBAR BACK PAIN: ICD-10-CM

## 2023-03-27 DIAGNOSIS — M96.1 POST LAMINECTOMY SYNDROME: ICD-10-CM

## 2023-03-27 RX ORDER — OXYCODONE AND ACETAMINOPHEN 10; 325 MG/1; MG/1
1 TABLET ORAL EVERY 6 HOURS PRN
Qty: 120 TABLET | Refills: 0 | Status: SHIPPED | OUTPATIENT
Start: 2023-03-27

## 2023-04-03 DIAGNOSIS — R32 URINARY INCONTINENCE, UNSPECIFIED TYPE: ICD-10-CM

## 2023-04-03 RX ORDER — TROSPIUM CHLORIDE ER 60 MG/1
60 CAPSULE ORAL
Qty: 30 CAPSULE | Refills: 3 | Status: SHIPPED | OUTPATIENT
Start: 2023-04-03

## 2023-04-25 DIAGNOSIS — M54.50 LUMBAR BACK PAIN: ICD-10-CM

## 2023-04-25 DIAGNOSIS — M96.1 POST LAMINECTOMY SYNDROME: ICD-10-CM

## 2023-04-25 RX ORDER — OXYCODONE AND ACETAMINOPHEN 10; 325 MG/1; MG/1
1 TABLET ORAL EVERY 6 HOURS PRN
Qty: 120 TABLET | Refills: 0 | Status: SHIPPED | OUTPATIENT
Start: 2023-04-25 | End: 2023-05-24 | Stop reason: SDUPTHER

## 2023-05-15 DIAGNOSIS — M54.50 LUMBAR BACK PAIN: ICD-10-CM

## 2023-05-15 RX ORDER — BACLOFEN 10 MG/1
TABLET ORAL
Qty: 90 TABLET | Refills: 0 | Status: SHIPPED | OUTPATIENT
Start: 2023-05-15

## 2023-05-24 DIAGNOSIS — M54.50 LUMBAR BACK PAIN: ICD-10-CM

## 2023-05-24 DIAGNOSIS — M96.1 POST LAMINECTOMY SYNDROME: ICD-10-CM

## 2023-05-24 RX ORDER — OXYCODONE AND ACETAMINOPHEN 10; 325 MG/1; MG/1
1 TABLET ORAL EVERY 6 HOURS PRN
Qty: 120 TABLET | Refills: 0 | Status: SHIPPED | OUTPATIENT
Start: 2023-05-24

## 2023-05-26 DIAGNOSIS — K58.9 SPASM OF COLON: Primary | ICD-10-CM

## 2023-05-26 RX ORDER — HYOSCYAMINE SULFATE 0.125 MG
0.12 TABLET ORAL EVERY 4 HOURS PRN
Qty: 120 TABLET | Refills: 0 | Status: SHIPPED | OUTPATIENT
Start: 2023-05-26

## 2023-05-30 DIAGNOSIS — I10 ESSENTIAL HYPERTENSION: ICD-10-CM

## 2023-05-30 RX ORDER — LISINOPRIL 20 MG/1
TABLET ORAL
Qty: 100 TABLET | Refills: 0 | Status: SHIPPED | OUTPATIENT
Start: 2023-05-30

## 2023-06-09 ENCOUNTER — LAB REQUISITION (OUTPATIENT)
Dept: LAB | Facility: HOSPITAL | Age: 78
End: 2023-06-09
Payer: COMMERCIAL

## 2023-06-09 DIAGNOSIS — Z00.00 ENCOUNTER FOR GENERAL ADULT MEDICAL EXAMINATION WITHOUT ABNORMAL FINDINGS: ICD-10-CM

## 2023-06-09 LAB
25(OH)D3 SERPL-MCNC: 27.8 NG/ML (ref 30–100)
ANION GAP SERPL CALCULATED.3IONS-SCNC: 6 MMOL/L (ref 4–13)
BASOPHILS # BLD AUTO: 0.08 THOUSANDS/ÂΜL (ref 0–0.1)
BASOPHILS NFR BLD AUTO: 1 % (ref 0–1)
BUN SERPL-MCNC: 19 MG/DL (ref 5–25)
CALCIUM SERPL-MCNC: 9 MG/DL (ref 8.3–10.1)
CHLORIDE SERPL-SCNC: 111 MMOL/L (ref 96–108)
CHOLEST SERPL-MCNC: 132 MG/DL
CO2 SERPL-SCNC: 24 MMOL/L (ref 21–32)
CREAT SERPL-MCNC: 1.3 MG/DL (ref 0.6–1.3)
EOSINOPHIL # BLD AUTO: 0.67 THOUSAND/ÂΜL (ref 0–0.61)
EOSINOPHIL NFR BLD AUTO: 8 % (ref 0–6)
ERYTHROCYTE [DISTWIDTH] IN BLOOD BY AUTOMATED COUNT: 13.6 % (ref 11.6–15.1)
EST. AVERAGE GLUCOSE BLD GHB EST-MCNC: 143 MG/DL
GFR SERPL CREATININE-BSD FRML MDRD: 38 ML/MIN/1.73SQ M
GLUCOSE SERPL-MCNC: 110 MG/DL (ref 65–140)
HBA1C MFR BLD: 6.6 %
HCT VFR BLD AUTO: 38.1 % (ref 34.8–46.1)
HDLC SERPL-MCNC: 48 MG/DL
HGB BLD-MCNC: 11.6 G/DL (ref 11.5–15.4)
IMM GRANULOCYTES # BLD AUTO: 0.03 THOUSAND/UL (ref 0–0.2)
IMM GRANULOCYTES NFR BLD AUTO: 0 % (ref 0–2)
LDLC SERPL CALC-MCNC: 51 MG/DL (ref 0–100)
LYMPHOCYTES # BLD AUTO: 2.17 THOUSANDS/ÂΜL (ref 0.6–4.47)
LYMPHOCYTES NFR BLD AUTO: 26 % (ref 14–44)
MCH RBC QN AUTO: 29.1 PG (ref 26.8–34.3)
MCHC RBC AUTO-ENTMCNC: 30.4 G/DL (ref 31.4–37.4)
MCV RBC AUTO: 96 FL (ref 82–98)
MONOCYTES # BLD AUTO: 0.57 THOUSAND/ÂΜL (ref 0.17–1.22)
MONOCYTES NFR BLD AUTO: 7 % (ref 4–12)
NEUTROPHILS # BLD AUTO: 4.76 THOUSANDS/ÂΜL (ref 1.85–7.62)
NEUTS SEG NFR BLD AUTO: 58 % (ref 43–75)
NONHDLC SERPL-MCNC: 84 MG/DL
NRBC BLD AUTO-RTO: 0 /100 WBCS
PLATELET # BLD AUTO: 305 THOUSANDS/UL (ref 149–390)
PMV BLD AUTO: 10.7 FL (ref 8.9–12.7)
POTASSIUM SERPL-SCNC: 4.6 MMOL/L (ref 3.5–5.3)
RBC # BLD AUTO: 3.99 MILLION/UL (ref 3.81–5.12)
SODIUM SERPL-SCNC: 141 MMOL/L (ref 135–147)
TRIGL SERPL-MCNC: 166 MG/DL
TSH SERPL DL<=0.05 MIU/L-ACNC: 1.52 UIU/ML (ref 0.45–4.5)
WBC # BLD AUTO: 8.28 THOUSAND/UL (ref 4.31–10.16)

## 2023-06-09 PROCEDURE — 83036 HEMOGLOBIN GLYCOSYLATED A1C: CPT | Performed by: FAMILY MEDICINE

## 2023-06-09 PROCEDURE — 84443 ASSAY THYROID STIM HORMONE: CPT | Performed by: FAMILY MEDICINE

## 2023-06-09 PROCEDURE — 85025 COMPLETE CBC W/AUTO DIFF WBC: CPT | Performed by: FAMILY MEDICINE

## 2023-06-09 PROCEDURE — 80048 BASIC METABOLIC PNL TOTAL CA: CPT | Performed by: FAMILY MEDICINE

## 2023-06-09 PROCEDURE — 82306 VITAMIN D 25 HYDROXY: CPT | Performed by: FAMILY MEDICINE

## 2023-06-09 PROCEDURE — 80061 LIPID PANEL: CPT | Performed by: FAMILY MEDICINE

## 2023-06-22 RX ORDER — MECLIZINE HYDROCHLORIDE 25 MG/1
TABLET ORAL
Qty: 60 TABLET | Refills: 0 | OUTPATIENT
Start: 2023-06-22

## 2023-07-13 PROCEDURE — 87086 URINE CULTURE/COLONY COUNT: CPT | Performed by: FAMILY MEDICINE

## 2023-07-13 PROCEDURE — 81001 URINALYSIS AUTO W/SCOPE: CPT | Performed by: FAMILY MEDICINE

## 2023-07-14 ENCOUNTER — LAB REQUISITION (OUTPATIENT)
Dept: LAB | Facility: HOSPITAL | Age: 78
End: 2023-07-14
Payer: COMMERCIAL

## 2023-07-14 DIAGNOSIS — Z00.00 ENCOUNTER FOR GENERAL ADULT MEDICAL EXAMINATION WITHOUT ABNORMAL FINDINGS: ICD-10-CM

## 2023-07-14 LAB
BACTERIA UR QL AUTO: ABNORMAL /HPF
BILIRUB UR QL STRIP: NEGATIVE
CLARITY UR: CLEAR
COLOR UR: ABNORMAL
GLUCOSE UR STRIP-MCNC: NEGATIVE MG/DL
HGB UR QL STRIP.AUTO: NEGATIVE
HYALINE CASTS #/AREA URNS LPF: ABNORMAL /LPF
KETONES UR STRIP-MCNC: NEGATIVE MG/DL
LEUKOCYTE ESTERASE UR QL STRIP: ABNORMAL
NITRITE UR QL STRIP: NEGATIVE
NON-SQ EPI CELLS URNS QL MICRO: ABNORMAL /HPF
PH UR STRIP.AUTO: 6 [PH]
PROT UR STRIP-MCNC: NEGATIVE MG/DL
RBC #/AREA URNS AUTO: ABNORMAL /HPF
SP GR UR STRIP.AUTO: 1.02 (ref 1–1.03)
UROBILINOGEN UR STRIP-ACNC: <2 MG/DL
WBC #/AREA URNS AUTO: ABNORMAL /HPF

## 2023-07-15 LAB — BACTERIA UR CULT: NORMAL

## 2023-07-26 ENCOUNTER — HOSPITAL ENCOUNTER (OUTPATIENT)
Dept: RADIOLOGY | Age: 78
Discharge: HOME | End: 2023-07-26
Payer: MEDICARE

## 2023-07-26 DIAGNOSIS — Z12.31 ENCOUNTER FOR SCREENING MAMMOGRAM FOR MALIGNANT NEOPLASM OF BREAST: ICD-10-CM

## 2023-07-26 PROCEDURE — 77067 SCR MAMMO BI INCL CAD: CPT

## 2023-07-26 PROCEDURE — 77063 BREAST TOMOSYNTHESIS BI: CPT

## 2023-08-26 ENCOUNTER — APPOINTMENT (EMERGENCY)
Dept: CT IMAGING | Facility: HOSPITAL | Age: 78
End: 2023-08-26
Payer: MEDICARE

## 2023-08-26 ENCOUNTER — APPOINTMENT (EMERGENCY)
Dept: RADIOLOGY | Facility: HOSPITAL | Age: 78
End: 2023-08-26
Payer: MEDICARE

## 2023-08-26 ENCOUNTER — HOSPITAL ENCOUNTER (INPATIENT)
Facility: HOSPITAL | Age: 78
LOS: 1 days | Discharge: HOME | End: 2023-08-28
Attending: EMERGENCY MEDICINE | Admitting: HOSPITALIST
Payer: MEDICARE

## 2023-08-26 DIAGNOSIS — R53.1 GENERALIZED WEAKNESS: ICD-10-CM

## 2023-08-26 DIAGNOSIS — M54.9 BACK PAIN: ICD-10-CM

## 2023-08-26 DIAGNOSIS — I95.1 ORTHOSTATIC HYPOTENSION: ICD-10-CM
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
## 2023-08-26 DIAGNOSIS — R26.2 AMBULATORY DYSFUNCTION: ICD-10-CM

## 2023-08-26 DIAGNOSIS — M54.2 NECK PAIN: ICD-10-CM

## 2023-08-26 DIAGNOSIS — R00.0 TACHYCARDIA: ICD-10-CM

## 2023-08-26 LAB
2HR DELTA HS TROPONIN: 3 NG/L
4HR DELTA HS TROPONIN: 8 NG/L
ALBUMIN SERPL BCP-MCNC: 3.6 G/DL (ref 3.5–5)
ALP SERPL-CCNC: 102 U/L (ref 34–104)
ALT SERPL W P-5'-P-CCNC: 8 U/L (ref 7–52)
ANION GAP SERPL CALCULATED.3IONS-SCNC: 8 MMOL/L
APTT PPP: 22 SECONDS (ref 23–37)
AST SERPL W P-5'-P-CCNC: 15 U/L (ref 13–39)
BACTERIA UR QL AUTO: ABNORMAL /HPF
BASOPHILS # BLD AUTO: 0.06 THOUSANDS/ÂΜL (ref 0–0.1)
BASOPHILS NFR BLD AUTO: 1 % (ref 0–1)
BILIRUB SERPL-MCNC: 0.24 MG/DL (ref 0.2–1)
BILIRUB UR QL STRIP: NEGATIVE
BUN SERPL-MCNC: 17 MG/DL (ref 5–25)
CALCIUM SERPL-MCNC: 9.3 MG/DL (ref 8.4–10.2)
CARDIAC TROPONIN I PNL SERPL HS: 12 NG/L
CARDIAC TROPONIN I PNL SERPL HS: 15 NG/L
CARDIAC TROPONIN I PNL SERPL HS: 20 NG/L
CHLORIDE SERPL-SCNC: 105 MMOL/L (ref 96–108)
CLARITY UR: ABNORMAL
CO2 SERPL-SCNC: 27 MMOL/L (ref 21–32)
COLOR UR: ABNORMAL
CREAT SERPL-MCNC: 0.92 MG/DL (ref 0.6–1.3)
EOSINOPHIL # BLD AUTO: 1.43 THOUSAND/ÂΜL (ref 0–0.61)
EOSINOPHIL NFR BLD AUTO: 16 % (ref 0–6)
ERYTHROCYTE [DISTWIDTH] IN BLOOD BY AUTOMATED COUNT: 13.6 % (ref 11.6–15.1)
GFR SERPL CREATININE-BSD FRML MDRD: 58 ML/MIN/1.73SQ M
GLUCOSE SERPL-MCNC: 138 MG/DL (ref 65–140)
GLUCOSE SERPL-MCNC: 145 MG/DL (ref 65–140)
GLUCOSE UR STRIP-MCNC: NEGATIVE MG/DL
HCT VFR BLD AUTO: 36.4 % (ref 34.8–46.1)
HGB BLD-MCNC: 11.2 G/DL (ref 11.5–15.4)
HGB UR QL STRIP.AUTO: NEGATIVE
IMM GRANULOCYTES # BLD AUTO: 0.03 THOUSAND/UL (ref 0–0.2)
IMM GRANULOCYTES NFR BLD AUTO: 0 % (ref 0–2)
INR PPP: 0.86 (ref 0.84–1.19)
KETONES UR STRIP-MCNC: NEGATIVE MG/DL
LACTATE SERPL-SCNC: 1.6 MMOL/L (ref 0.5–2)
LEUKOCYTE ESTERASE UR QL STRIP: ABNORMAL
LYMPHOCYTES # BLD AUTO: 1.87 THOUSANDS/ÂΜL (ref 0.6–4.47)
LYMPHOCYTES NFR BLD AUTO: 20 % (ref 14–44)
MCH RBC QN AUTO: 28.4 PG (ref 26.8–34.3)
MCHC RBC AUTO-ENTMCNC: 30.8 G/DL (ref 31.4–37.4)
MCV RBC AUTO: 92 FL (ref 82–98)
MONOCYTES # BLD AUTO: 0.71 THOUSAND/ÂΜL (ref 0.17–1.22)
MONOCYTES NFR BLD AUTO: 8 % (ref 4–12)
NEUTROPHILS # BLD AUTO: 5.09 THOUSANDS/ÂΜL (ref 1.85–7.62)
NEUTS SEG NFR BLD AUTO: 55 % (ref 43–75)
NITRITE UR QL STRIP: NEGATIVE
NON-SQ EPI CELLS URNS QL MICRO: ABNORMAL /HPF
NRBC BLD AUTO-RTO: 0 /100 WBCS
PH UR STRIP.AUTO: 5.5 [PH]
PLATELET # BLD AUTO: 262 THOUSANDS/UL (ref 149–390)
PMV BLD AUTO: 11.2 FL (ref 8.9–12.7)
POTASSIUM SERPL-SCNC: 3.9 MMOL/L (ref 3.5–5.3)
PROT SERPL-MCNC: 6.4 G/DL (ref 6.4–8.4)
PROT UR STRIP-MCNC: NEGATIVE MG/DL
PROTHROMBIN TIME: 12.3 SECONDS (ref 11.6–14.5)
RBC # BLD AUTO: 3.94 MILLION/UL (ref 3.81–5.12)
RBC #/AREA URNS AUTO: ABNORMAL /HPF
SODIUM SERPL-SCNC: 140 MMOL/L (ref 135–147)
SP GR UR STRIP.AUTO: 1.01 (ref 1–1.03)
UROBILINOGEN UR STRIP-ACNC: <2 MG/DL
WBC # BLD AUTO: 9.19 THOUSAND/UL (ref 4.31–10.16)
WBC #/AREA URNS AUTO: ABNORMAL /HPF

## 2023-08-26 PROCEDURE — 85025 COMPLETE CBC W/AUTO DIFF WBC: CPT

## 2023-08-26 PROCEDURE — 99285 EMERGENCY DEPT VISIT HI MDM: CPT | Performed by: EMERGENCY MEDICINE

## 2023-08-26 PROCEDURE — 96375 TX/PRO/DX INJ NEW DRUG ADDON: CPT

## 2023-08-26 PROCEDURE — 96365 THER/PROPH/DIAG IV INF INIT: CPT

## 2023-08-26 PROCEDURE — 36415 COLL VENOUS BLD VENIPUNCTURE: CPT

## 2023-08-26 PROCEDURE — G1004 CDSM NDSC: HCPCS

## 2023-08-26 PROCEDURE — 81001 URINALYSIS AUTO W/SCOPE: CPT

## 2023-08-26 PROCEDURE — 85610 PROTHROMBIN TIME: CPT

## 2023-08-26 PROCEDURE — 80053 COMPREHEN METABOLIC PANEL: CPT

## 2023-08-26 PROCEDURE — 72125 CT NECK SPINE W/O DYE: CPT

## 2023-08-26 PROCEDURE — 93005 ELECTROCARDIOGRAM TRACING: CPT

## 2023-08-26 PROCEDURE — 82948 REAGENT STRIP/BLOOD GLUCOSE: CPT

## 2023-08-26 PROCEDURE — 73080 X-RAY EXAM OF ELBOW: CPT

## 2023-08-26 PROCEDURE — 84484 ASSAY OF TROPONIN QUANT: CPT

## 2023-08-26 PROCEDURE — 70450 CT HEAD/BRAIN W/O DYE: CPT

## 2023-08-26 PROCEDURE — 85730 THROMBOPLASTIN TIME PARTIAL: CPT

## 2023-08-26 PROCEDURE — 99285 EMERGENCY DEPT VISIT HI MDM: CPT

## 2023-08-26 PROCEDURE — 96366 THER/PROPH/DIAG IV INF ADDON: CPT

## 2023-08-26 PROCEDURE — 87086 URINE CULTURE/COLONY COUNT: CPT

## 2023-08-26 PROCEDURE — 74176 CT ABD & PELVIS W/O CONTRAST: CPT

## 2023-08-26 PROCEDURE — 99223 1ST HOSP IP/OBS HIGH 75: CPT | Performed by: HOSPITALIST

## 2023-08-26 PROCEDURE — 83605 ASSAY OF LACTIC ACID: CPT

## 2023-08-26 PROCEDURE — 87040 BLOOD CULTURE FOR BACTERIA: CPT

## 2023-08-26 PROCEDURE — 71250 CT THORAX DX C-: CPT

## 2023-08-26 RX ORDER — BACLOFEN 10 MG/1
10 TABLET ORAL 3 TIMES DAILY PRN
Status: DISCONTINUED | OUTPATIENT
Start: 2023-08-26 | End: 2023-08-28 | Stop reason: HOSPADM

## 2023-08-26 RX ORDER — INSULIN LISPRO 100 [IU]/ML
1-5 INJECTION, SOLUTION INTRAVENOUS; SUBCUTANEOUS
Status: DISCONTINUED | OUTPATIENT
Start: 2023-08-27 | End: 2023-08-28 | Stop reason: HOSPADM

## 2023-08-26 RX ORDER — ACETAMINOPHEN 325 MG/1
975 TABLET ORAL EVERY 8 HOURS SCHEDULED
Status: DISCONTINUED | OUTPATIENT
Start: 2023-08-27 | End: 2023-08-28 | Stop reason: HOSPADM

## 2023-08-26 RX ORDER — ACETAMINOPHEN 325 MG/1
975 TABLET ORAL EVERY 8 HOURS SCHEDULED
Status: DISCONTINUED | OUTPATIENT
Start: 2023-08-26 | End: 2023-08-26

## 2023-08-26 RX ORDER — ONDANSETRON 2 MG/ML
4 INJECTION INTRAMUSCULAR; INTRAVENOUS ONCE
Status: COMPLETED | OUTPATIENT
Start: 2023-08-26 | End: 2023-08-26

## 2023-08-26 RX ORDER — ATORVASTATIN CALCIUM 40 MG/1
40 TABLET, FILM COATED ORAL DAILY
Status: DISCONTINUED | OUTPATIENT
Start: 2023-08-27 | End: 2023-08-28 | Stop reason: HOSPADM

## 2023-08-26 RX ORDER — METOPROLOL SUCCINATE 25 MG/1
25 TABLET, EXTENDED RELEASE ORAL DAILY
COMMUNITY

## 2023-08-26 RX ORDER — LIDOCAINE 50 MG/G
1 PATCH TOPICAL DAILY
Status: DISCONTINUED | OUTPATIENT
Start: 2023-08-26 | End: 2023-08-28 | Stop reason: HOSPADM

## 2023-08-26 RX ORDER — SACCHAROMYCES BOULARDII 250 MG
250 CAPSULE ORAL 2 TIMES DAILY
Status: DISCONTINUED | OUTPATIENT
Start: 2023-08-26 | End: 2023-08-28 | Stop reason: HOSPADM

## 2023-08-26 RX ORDER — OXYBUTYNIN CHLORIDE 5 MG/1
10 TABLET, EXTENDED RELEASE ORAL DAILY
Status: DISCONTINUED | OUTPATIENT
Start: 2023-08-27 | End: 2023-08-28 | Stop reason: HOSPADM

## 2023-08-26 RX ORDER — METOCLOPRAMIDE 10 MG/1
10 TABLET ORAL 4 TIMES DAILY PRN
Status: DISCONTINUED | OUTPATIENT
Start: 2023-08-26 | End: 2023-08-28 | Stop reason: HOSPADM

## 2023-08-26 RX ORDER — LISINOPRIL 20 MG/1
20 TABLET ORAL DAILY
Status: DISCONTINUED | OUTPATIENT
Start: 2023-08-27 | End: 2023-08-28 | Stop reason: HOSPADM

## 2023-08-26 RX ORDER — PANTOPRAZOLE SODIUM 40 MG/1
40 TABLET, DELAYED RELEASE ORAL
Status: DISCONTINUED | OUTPATIENT
Start: 2023-08-27 | End: 2023-08-28 | Stop reason: HOSPADM

## 2023-08-26 RX ORDER — DOXYCYCLINE HYCLATE 100 MG
100 TABLET ORAL 2 TIMES DAILY
COMMUNITY
Start: 2023-06-16 | End: 2023-08-28

## 2023-08-26 RX ORDER — ALBUTEROL SULFATE 90 UG/1
2 AEROSOL, METERED RESPIRATORY (INHALATION) EVERY 6 HOURS PRN
Status: DISCONTINUED | OUTPATIENT
Start: 2023-08-26 | End: 2023-08-28 | Stop reason: HOSPADM

## 2023-08-26 RX ORDER — LISINOPRIL 20 MG/1
20 TABLET ORAL DAILY
Status: DISCONTINUED | OUTPATIENT
Start: 2023-08-27 | End: 2023-08-26

## 2023-08-26 RX ORDER — ACETAMINOPHEN 325 MG/1
975 TABLET ORAL ONCE
Status: COMPLETED | OUTPATIENT
Start: 2023-08-26 | End: 2023-08-26

## 2023-08-26 RX ORDER — OXYCODONE HYDROCHLORIDE AND ACETAMINOPHEN 5; 325 MG/1; MG/1
2 TABLET ORAL EVERY 6 HOURS PRN
Status: DISCONTINUED | OUTPATIENT
Start: 2023-08-26 | End: 2023-08-27

## 2023-08-26 RX ORDER — LEVOTHYROXINE SODIUM 0.07 MG/1
37.5 TABLET ORAL
Status: DISCONTINUED | OUTPATIENT
Start: 2023-08-27 | End: 2023-08-28 | Stop reason: HOSPADM

## 2023-08-26 RX ORDER — ENOXAPARIN SODIUM 100 MG/ML
40 INJECTION SUBCUTANEOUS DAILY
Status: DISCONTINUED | OUTPATIENT
Start: 2023-08-27 | End: 2023-08-28 | Stop reason: HOSPADM

## 2023-08-26 RX ORDER — ASPIRIN 81 MG/1
81 TABLET, CHEWABLE ORAL DAILY
Status: DISCONTINUED | OUTPATIENT
Start: 2023-08-27 | End: 2023-08-28 | Stop reason: HOSPADM

## 2023-08-26 RX ORDER — LABETALOL HYDROCHLORIDE 5 MG/ML
10 INJECTION, SOLUTION INTRAVENOUS EVERY 6 HOURS PRN
Status: DISCONTINUED | OUTPATIENT
Start: 2023-08-26 | End: 2023-08-28 | Stop reason: HOSPADM

## 2023-08-26 RX ORDER — PREDNISONE 50 MG/1
50 TABLET ORAL DAILY
COMMUNITY
Start: 2023-06-16 | End: 2023-06-20

## 2023-08-26 RX ORDER — LANOLIN ALCOHOL/MO/W.PET/CERES
6 CREAM (GRAM) TOPICAL
Status: DISCONTINUED | OUTPATIENT
Start: 2023-08-26 | End: 2023-08-28 | Stop reason: HOSPADM

## 2023-08-26 RX ORDER — METOPROLOL SUCCINATE 25 MG/1
25 TABLET, EXTENDED RELEASE ORAL DAILY
Status: DISCONTINUED | OUTPATIENT
Start: 2023-08-27 | End: 2023-08-28 | Stop reason: HOSPADM

## 2023-08-26 RX ORDER — LABETALOL HYDROCHLORIDE 5 MG/ML
10 INJECTION, SOLUTION INTRAVENOUS ONCE
Status: COMPLETED | OUTPATIENT
Start: 2023-08-26 | End: 2023-08-26

## 2023-08-26 RX ADMIN — BACLOFEN 10 MG: 10 TABLET ORAL at 23:41

## 2023-08-26 RX ADMIN — SODIUM CHLORIDE, SODIUM LACTATE, POTASSIUM CHLORIDE, AND CALCIUM CHLORIDE 500 ML: .6; .31; .03; .02 INJECTION, SOLUTION INTRAVENOUS at 19:04

## 2023-08-26 RX ADMIN — ACETAMINOPHEN 975 MG: 325 TABLET, FILM COATED ORAL at 21:23

## 2023-08-26 RX ADMIN — LIDOCAINE 1 PATCH: 700 PATCH TOPICAL at 23:10

## 2023-08-26 RX ADMIN — ONDANSETRON 4 MG: 2 INJECTION INTRAMUSCULAR; INTRAVENOUS at 16:19

## 2023-08-26 RX ADMIN — Medication 2.5 MG: at 21:22

## 2023-08-26 RX ADMIN — Medication 10 MG: at 23:18

## 2023-08-26 NOTE — LETTER
Thank you for allowing us to participate in the care of your patient, Virgilio Mendez, who was hospitalized from 8/26/2023 through 8/28/2023 with the admitting diagnosis of ambulatory dysfunction due to fall. She was found to be orthostatic in the ED and was given lactated Ringer bolus. Blood pressures were high which were treated with her at home blood pressure medications and subsequently normalized. CT scans all resulted normal.  PT OT evaluated and deemed her fit for discharge. She was discharged home with instructions to continue care with her [facility] life and her private nurse. Would like for her to get audiometry testing and to follow up with a neurologist outpatient. If you have any additional questions or would like to discuss further, please feel free to contact me.     Brooke Omer MD  [facility] Internal Medicine, Hospitalist  [phone]

## 2023-08-26 NOTE — ED NOTES
Completed ortho vitals. Patient was able to stand but only with assistance.  Very unsteady on her feet and said she "felt a little dizzy."     Uzair Jeff  08/26/23 1817

## 2023-08-26 NOTE — Clinical Note
Case was discussed with [facility] resident and the patient's admission status was agreed to be Admission Status: observation status to the service of Dr. Danilo Ramirez .

## 2023-08-26 NOTE — ED ATTENDING ATTESTATION
8/26/2023  I, Debra Hanson MD, saw and evaluated the patient. I have discussed the patient with the resident/non-physician practitioner and agree with the resident's/non-physician practitioner's findings, Plan of Care, and MDM as documented in the resident's/non-physician practitioner's note, except where noted. All available labs and Radiology studies were reviewed. I was present for key portions of any procedure(s) performed by the resident/non-physician practitioner and I was immediately available to provide assistance. At this point I agree with the current assessment done in the Emergency Department. I have conducted an independent evaluation of this patient a history and physical is as follows:    History    Patient is an 78-year-old female, with a history significant for diabetes, hyperlipidemia, prior laminectomy, prior spinal fusion, spinal cord stimulator, who presents to the ED today, via EMS, after falling backwards and striking her upper and mid back on the coffee table. Patient states this is the second time she has fallen in the last 1.5 weeks. She denies amnesia surrounding the event as well as prodromal symptoms such as palpitations, lightheadedness, chest pain, dyspnea, abdominal pain. Patient did not seek medical attention after the first fall. Patient also denies weakness, numbness, vision change, dysphagia. Patient states that, while using her walker, each time she fell she had stood up, taken 3 steps and falling backwards without head strike or loss of consciousness. Patient is currently complaining about diffuse pain throughout the chest wall, back, abdomen, left elbow. Per patient's daughter, present in room, patient is at her baseline mental status. Patient is without other concerns at this time.      ROS  Patient denies: Fever; dysphagia; vision change; chest pain; dyspnea; abdominal pain; dysuria; rash; weakness; numbness; confusion    Physical Exam    GENERAL APPEARANCE: NAD  NEURO: Patient is speaking clearly in complete sentences. Patient is answering appropriately and able follow commands. Patient is moving all four extremities spontaneously. No facial droop. Tongue midline. Intact L5 and S1 motor and sensory function bilaterally. HEENT: PERRL, Moist mucous membranes, external ears normal, nose normal  Neck: No cervical adenopathy  CV: RRR. No murmurs, rubs, gallops  LUNGS: Clear to auscultation: No wheezes, stridor, rhonchi, rales  GI: Abdomen non-distended. Soft. Diffusely tender to palpation with neither rebound nor guarding. : Deferred at this time  MSK: No deformity. Left elbow, bilateral chest wall, and midline C, T, L spine tenderness to palpation. No tenderness to palpation of the bilateral shoulders, arms, thighs, knees, legs. No  pelvic tenderness to palpation. B/l LE edema. Skin: Warm and dry. No skin defect over the tender area   Capillary refill: <2 seconds    Assessment/Plan  Fall, diffuse pain, tachycardia, lower extremity edema  -Concern for ACS, electrolyte abnormality, arrhythmia, fracture, ICH, orthostatic hypotension, renal failure, liver failure. Patient at risk for pneumonia and UTI. -Will investigate with Ct imaging of the head, neck, chest/abdomen/pelvis, sepsis panel order set, troponin, orthostatic vital signs  -Will manage with Zofran, further based on work-up. ED Course  ED Course as of 08/26/23 2131   Sat Aug 26, 2023   1610 Hemoglobin(!): 11.2  Low, near baseline    1620 WBC: 9.19  WNL   1634 LACTIC ACID: 1.6  WNL   1849 Patient has positive orthostatics. Will administer fluid bolus and attempt to stand. Patient was significantly symptomatic with standing during orthostatic testing.    2045 Delta 4hr hsTnI: 8  High          Critical Care Time  Procedures

## 2023-08-27 PROBLEM — R82.90 ABNORMAL URINALYSIS: Status: ACTIVE | Noted: 2023-08-27

## 2023-08-27 LAB
ANION GAP SERPL CALCULATED.3IONS-SCNC: 7 MMOL/L
ATRIAL RATE: 105 BPM
BUN SERPL-MCNC: 15 MG/DL (ref 5–25)
CALCIUM SERPL-MCNC: 8.8 MG/DL (ref 8.4–10.2)
CHLORIDE SERPL-SCNC: 105 MMOL/L (ref 96–108)
CO2 SERPL-SCNC: 25 MMOL/L (ref 21–32)
CREAT SERPL-MCNC: 0.87 MG/DL (ref 0.6–1.3)
ERYTHROCYTE [DISTWIDTH] IN BLOOD BY AUTOMATED COUNT: 13.7 % (ref 11.6–15.1)
GFR SERPL CREATININE-BSD FRML MDRD: 63 ML/MIN/1.73SQ M
GLUCOSE SERPL-MCNC: 129 MG/DL (ref 65–140)
GLUCOSE SERPL-MCNC: 144 MG/DL (ref 65–140)
GLUCOSE SERPL-MCNC: 170 MG/DL (ref 65–140)
GLUCOSE SERPL-MCNC: 173 MG/DL (ref 65–140)
GLUCOSE SERPL-MCNC: 270 MG/DL (ref 65–140)
HCT VFR BLD AUTO: 32.9 % (ref 34.8–46.1)
HGB BLD-MCNC: 10.3 G/DL (ref 11.5–15.4)
MCH RBC QN AUTO: 28.9 PG (ref 26.8–34.3)
MCHC RBC AUTO-ENTMCNC: 31.3 G/DL (ref 31.4–37.4)
MCV RBC AUTO: 92 FL (ref 82–98)
P AXIS: 50 DEGREES
PLATELET # BLD AUTO: 245 THOUSANDS/UL (ref 149–390)
PMV BLD AUTO: 10.2 FL (ref 8.9–12.7)
POTASSIUM SERPL-SCNC: 4.3 MMOL/L (ref 3.5–5.3)
PR INTERVAL: 140 MS
QRS AXIS: -20 DEGREES
QRSD INTERVAL: 122 MS
QT INTERVAL: 400 MS
QTC INTERVAL: 528 MS
RBC # BLD AUTO: 3.57 MILLION/UL (ref 3.81–5.12)
SODIUM SERPL-SCNC: 137 MMOL/L (ref 135–147)
T WAVE AXIS: 88 DEGREES
VENTRICULAR RATE: 105 BPM
WBC # BLD AUTO: 7.91 THOUSAND/UL (ref 4.31–10.16)

## 2023-08-27 PROCEDURE — 85027 COMPLETE CBC AUTOMATED: CPT

## 2023-08-27 PROCEDURE — 93010 ELECTROCARDIOGRAM REPORT: CPT | Performed by: INTERNAL MEDICINE

## 2023-08-27 PROCEDURE — 82948 REAGENT STRIP/BLOOD GLUCOSE: CPT

## 2023-08-27 PROCEDURE — 80048 BASIC METABOLIC PNL TOTAL CA: CPT

## 2023-08-27 RX ORDER — OXYCODONE HYDROCHLORIDE 5 MG/1
5 TABLET ORAL EVERY 4 HOURS PRN
Status: DISCONTINUED | OUTPATIENT
Start: 2023-08-27 | End: 2023-08-28 | Stop reason: HOSPADM

## 2023-08-27 RX ORDER — OXYCODONE HYDROCHLORIDE 10 MG/1
10 TABLET ORAL EVERY 4 HOURS PRN
Status: DISCONTINUED | OUTPATIENT
Start: 2023-08-27 | End: 2023-08-28 | Stop reason: HOSPADM

## 2023-08-27 RX ADMIN — Medication 250 MG: at 17:19

## 2023-08-27 RX ADMIN — METOCLOPRAMIDE 10 MG: 10 TABLET ORAL at 06:00

## 2023-08-27 RX ADMIN — ASPIRIN 81 MG 81 MG: 81 TABLET ORAL at 08:26

## 2023-08-27 RX ADMIN — ACETAMINOPHEN 975 MG: 325 TABLET, FILM COATED ORAL at 05:53

## 2023-08-27 RX ADMIN — ACETAMINOPHEN 975 MG: 325 TABLET, FILM COATED ORAL at 21:36

## 2023-08-27 RX ADMIN — ENOXAPARIN SODIUM 40 MG: 40 INJECTION SUBCUTANEOUS at 08:26

## 2023-08-27 RX ADMIN — LISINOPRIL 20 MG: 20 TABLET ORAL at 08:26

## 2023-08-27 RX ADMIN — ATORVASTATIN CALCIUM 40 MG: 40 TABLET, FILM COATED ORAL at 08:26

## 2023-08-27 RX ADMIN — PANTOPRAZOLE SODIUM 40 MG: 40 TABLET, DELAYED RELEASE ORAL at 05:53

## 2023-08-27 RX ADMIN — INSULIN LISPRO 3 UNITS: 100 INJECTION, SOLUTION INTRAVENOUS; SUBCUTANEOUS at 12:51

## 2023-08-27 RX ADMIN — LEVOTHYROXINE SODIUM 37.5 MCG: 75 TABLET ORAL at 05:53

## 2023-08-27 RX ADMIN — OXYCODONE HYDROCHLORIDE 10 MG: 10 TABLET ORAL at 10:42

## 2023-08-27 RX ADMIN — ACETAMINOPHEN 975 MG: 325 TABLET, FILM COATED ORAL at 13:55

## 2023-08-27 RX ADMIN — INSULIN LISPRO 1 UNITS: 100 INJECTION, SOLUTION INTRAVENOUS; SUBCUTANEOUS at 17:38

## 2023-08-27 RX ADMIN — METOPROLOL SUCCINATE 25 MG: 25 TABLET, EXTENDED RELEASE ORAL at 08:26

## 2023-08-27 RX ADMIN — OXYBUTYNIN CHLORIDE 10 MG: 5 TABLET, EXTENDED RELEASE ORAL at 08:26

## 2023-08-27 RX ADMIN — Medication 6 MG: at 21:36

## 2023-08-27 RX ADMIN — Medication 250 MG: at 08:32

## 2023-08-27 RX ADMIN — BACLOFEN 10 MG: 10 TABLET ORAL at 05:53

## 2023-08-27 RX ADMIN — LIDOCAINE 1 PATCH: 700 PATCH TOPICAL at 21:36

## 2023-08-27 NOTE — ASSESSMENT & PLAN NOTE
Lab Results   Component Value Date    HGBA1C 6.6 (H) 06/09/2023       No results for input(s): "POCGLU" in the last 72 hours. Blood Sugar Average: Last 72 hrs:  · Holding home metformin   · Continue sliding scale  · Continue carbohydrate controlled diet.

## 2023-08-27 NOTE — PLAN OF CARE
Problem: Potential for Falls  Goal: Patient will remain free of falls  Description: INTERVENTIONS:  - Educate patient/family on patient safety including physical limitations  - Instruct patient to call for assistance with activity   - Consult OT/PT to assist with strengthening/mobility   - Keep Call bell within reach  - Keep bed low and locked with side rails adjusted as appropriate  - Keep care items and personal belongings within reach  - Initiate and maintain comfort rounds  - Make Fall Risk Sign visible to staff  - Offer Toileting every 2 Hours, in advance of need  - Initiate/Maintain bed/chair alarm  - Obtain necessary fall risk management equipment:   - Apply yellow socks and bracelet for high fall risk patients  - Consider moving patient to room near nurses station  Outcome: Progressing     Problem: MOBILITY - ADULT  Goal: Maintain or return to baseline ADL function  Description: INTERVENTIONS:  -  Assess patient's ability to carry out ADLs; assess patient's baseline for ADL function and identify physical deficits which impact ability to perform ADLs (bathing, care of mouth/teeth, toileting, grooming, dressing, etc.)  - Assess/evaluate cause of self-care deficits   - Assess range of motion  - Assess patient's mobility; develop plan if impaired  - Assess patient's need for assistive devices and provide as appropriate  - Encourage maximum independence but intervene and supervise when necessary  - Involve family in performance of ADLs  - Assess for home care needs following discharge   - Consider OT consult to assist with ADL evaluation and planning for discharge  - Provide patient education as appropriate  Outcome: Progressing  Goal: Maintains/Returns to pre admission functional level  Description: INTERVENTIONS:  - Perform BMAT or MOVE assessment daily.   - Set and communicate daily mobility goal to care team and patient/family/caregiver.    - Collaborate with rehabilitation services on mobility goals if consulted  - Perform Range of Motion 3 times a day.   - Dangle patient 3 times a day  - Stand patient 2 times a day  - Ambulate patient 2 times a day  - Out of bed to chair 3 times a day   - Out of bed for meals 3 times a day  - Out of bed for toileting  - Record patient progress and toleration of activity level   Outcome: Progressing

## 2023-08-27 NOTE — UTILIZATION REVIEW
Initial Clinical Review    Admission: Date/Time/Statement:   Admission Orders (From admission, onward)     Ordered        08/26/23 2130  Place in Observation  Once                      08/27/23 1424  Inpatient Admission  Once        Transfer Service: Hospitalist    Question Answer Comment   Level of Care Med Surg    Estimated length of stay More than 2 Midnights    Certification I certify that inpatient services are medically necessary for this patient for a duration of greater than two midnights. See H&P and MD Progress Notes for additional information about the patient's course of treatment. 08/27/23 1423   OBSERVATION    8/26  @  2131  CHANGED TO IP ADMISSION   8/27  @   1423  NEEDS  PT/OT/REHAB    ED Arrival Information     Expected   -    Arrival   8/26/2023 15:12    Acuity   Emergent            Means of arrival   Ambulance    Escorted by   [facility]    Service   Hospitalist    Admission type   Emergency            Arrival complaint   ems           Chief Complaint   Patient presents with   • Fall     Arrives via EMS from home s/p fall @ 1230 today. Patient reports feeling slight dizziness while ambulating, causing her to fall backwards, striking back and upper body on wooden coffee table. EMS reports coffee table completed destroyed from force of fall. (-) headstrike/LOC (-) thinners. Ambulatory after fall. C/o lower back pain radiating to shoulder. Hx multiple back surgeries. Initial Presentation: 78 y.o. female presents to ED via  EMS  From home after falling backwards, striking her upper and mid back on a table. Now  Second  Fall in past  1-2  Weeks. No prodrome. PMH  Is   Chronic pain syndrome,  COPD, DM2, not on  Insulin, HTN and  Hypothyroidism. Goes to OP PT. Complains of soreness in neck and back. Denies headache, dizziness, weakness or numbness. Positive orthostasis in  ED and received  1  Bolus  500 cc  LR. BP   150 - 247/80 - 120. Ct scan negative.    Admit  Observation with  Ambulatory dysfunction,  Hypertensive urgency, anemia, abnormal  U/A and plan is  Monitor  BP,  PT/OT, fall precautions, hold antibiotics, tele, pain control and continue home meds. Date:   8/28    Day 3: Has surpassed a 2nd midnight with active treatments and services, which include   Continue  PT/OT. Kishore Humphrey Plan is  D/c  Home this pm        ED Triage Vitals   Temperature Pulse Respirations Blood Pressure SpO2   08/26/23 1521 08/26/23 1515 08/26/23 1515 08/26/23 1515 08/26/23 1515   98.3 °F (36.8 °C) 104 18 (!) 150/108 96 %      Temp Source Heart Rate Source Patient Position - Orthostatic VS BP Location FiO2 (%)   08/26/23 1521 08/26/23 1515 08/26/23 1515 08/26/23 1515 --   Oral Monitor Sitting Right arm       Pain Score       08/26/23 1515       8          Wt Readings from Last 1 Encounters:   08/27/23 69.2 kg (152 lb 9.6 oz)     Additional Vital Signs:   98 °F (36.7 °C) -- -- -- -- -- -- --    08/27/23 07:02:11 -- 92 -- 149/81 104 93 % -- --   08/26/23 2145 -- 107 Abnormal  18 158/79 113 98 % None (Room air) Lying   08/26/23 2055 -- 123 Abnormal  18 185/89 Abnormal  -- -- -- Standing for 3 minutes - Orthostatic VS   08/26/23 2052 -- 119 Abnormal  18 135/73 -- -- -- Standing - Orthostatic VS   08/26/23 2049 -- 107 Abnormal  18 165/83 -- -- -- Sitting - Orthostatic VS   08/26/23 2048 -- 104 18 192/85 Abnormal  -- -- -- --   08/26/23 2000 -- 103 18 193/95 Abnormal  132 98 % None (Room air) Lying   08/26/23 1816 -- -- -- 170/88 -- -- -- Standing for 3 minutes - Orthostatic VS   08/26/23 1813 -- -- -- 127/79 -- -- -- Standing - Orthostatic VS   08/26/23 1810 -- -- -- 163/82 -- -- -- Sitting - Orthostatic VS   08/26/23 1809 -- -- -- 171/84 Abnormal  -- -- -- Lying - Orthostatic VS   08/26/23 1745 -- 102 18 152/74 107 95 % None (Room air) Sitting   08/26/23 1630 -- 102 18 179/88 Abnormal  -- 93 % None (Room air) Sitting   08/26/23 1545 -- 112 Abnormal  18 247/122 Abnormal   175 95 % None (Room air) Sitting   BP: ED provider at bedside and aware at 08/26/23 1545   08/26/23 1532 -- -- -- -- -- -- None (Room air) --   08/26/23 1530 -- 102 18 142/101 Abnormal  115 97 % None (Room air) Sitting   08/26/23 1521 98.3 °F (36.8 °C) 100 18 142/101 Abnormal    -- 98 % None (Room air) Sitting   BP: /120's en route via ems - hx HTN and took all BP meds as prescribed today at 08/26/23 1521   08/26/23 1515 -- 104 18 150/108 Abnormal  -- 96 % None (Room air) Sitting       Pertinent Labs/Diagnostic Test Results:   XR elbow 3+ vw LEFT   ED Interpretation by Priya Carey MD (08/26 1850)   Per my independent interpretation: No acute osseous abnormality      CT head wo contrast   Final Result by Yani Pacheco MD (08/26 1730)      No acute intracranial abnormality. Chronic microangiopathic changes. Workstation performed: [id number]         CT spine cervical without contrast   Final Result by Yani Pacheco MD (08/26 1734)      No cervical spine fracture or traumatic malalignment. Workstation performed: [id number]         CT chest abdomen pelvis wo contrast   Final Result by Yani Pacheco MD (08/26 1800)      No evidence of acute traumatic injury throughout the chest, abdomen or pelvis. Workstation performed: [id number]         CT recon only thoracolumbar   Final Result by Yani Pacheco MD (08/26 1749)      No fracture or traumatic subluxation. Extensive T12-L5 posterior fusion appears intact.                Workstation performed: [id number]               Results from last 7 days   Lab Units 08/27/23  0704 08/26/23  1556   WBC Thousand/uL 7.91 9.19   HEMOGLOBIN g/dL 10.3* 11.2*   HEMATOCRIT % 32.9* 36.4   PLATELETS Thousands/uL 245 262   NEUTROS ABS Thousands/µL  --  5.09         Results from last 7 days   Lab Units 08/27/23  0704 08/26/23  1556   SODIUM mmol/L 137 140   POTASSIUM mmol/L 4.3 3.9   CHLORIDE mmol/L 105 105   CO2 mmol/L 25 27   ANION GAP mmol/L 7 8   BUN mg/dL 15 17 CREATININE mg/dL 0.87 0.92   EGFR ml/min/1.73sq m 63 58   CALCIUM mg/dL 8.8 9.3     Results from last 7 days   Lab Units 08/26/23  1556   AST U/L 15   ALT U/L 8   ALK PHOS U/L 102   TOTAL PROTEIN g/dL 6.4   ALBUMIN g/dL 3.6   TOTAL BILIRUBIN mg/dL 0.24     Results from last 7 days   Lab Units 08/27/23  0601 08/26/23  2237   POC GLUCOSE mg/dl 129 145*     Results from last 7 days   Lab Units 08/27/23  0704 08/26/23  1556   GLUCOSE RANDOM mg/dL 144* 138           Results from last 7 days   Lab Units 08/26/23 2007 08/26/23  1756 08/26/23  1556   HS TNI 0HR ng/L  --   --  12   HS TNI 2HR ng/L  --  15  --    HSTNI D2 ng/L  --  3  --    HS TNI 4HR ng/L 20  --   --    HSTNI D4 ng/L 8  --   --          Results from last 7 days   Lab Units 08/26/23  1556   PROTIME seconds 12.3   INR  0.86   PTT seconds 22*             Results from last 7 days   Lab Units 08/26/23  1556   LACTIC ACID mmol/L 1.6               Results from last 7 days   Lab Units 08/26/23 2007   CLARITY UA  Turbid   COLOR UA  Light Yellow   SPEC GRAV UA  1.013   PH UA  5.5   GLUCOSE UA mg/dl Negative   KETONES UA mg/dl Negative   BLOOD UA  Negative   PROTEIN UA mg/dl Negative   NITRITE UA  Negative   BILIRUBIN UA  Negative   UROBILINOGEN UA (BE) mg/dl <2.0   LEUKOCYTES UA  Moderate*   WBC UA /hpf 4-10*   RBC UA /hpf 1-2   BACTERIA UA /hpf Occasional   EPITHELIAL CELLS WET PREP /hpf Moderate*               Results from last 7 days   Lab Units 08/26/23  1620 08/26/23  1600   BLOOD CULTURE  Received in Microbiology Lab. Culture in Progress. Received in Microbiology Lab. Culture in Progress.                    ED Treatment:   Medication Administration from 08/26/2023 1512 to 08/26/2023 2220       Date/Time Order Dose Route Action Comments     08/26/2023 1619 EDT ondansetron (ZOFRAN) injection 4 mg 4 mg Intravenous Given --     08/26/2023 2043 EDT lactated ringers bolus 500 mL 0 mL Intravenous Stopped --     08/26/2023 1904 EDT lactated ringers bolus 500 mL 500 mL Intravenous New Bag --     08/26/2023 2122 EDT oxyCODONE (ROXICODONE) split tablet 2.5 mg 2.5 mg Oral Given --     08/26/2023 2123 EDT acetaminophen (TYLENOL) tablet 975 mg 975 mg Oral Given --        Present on Admission:  • Type 2 diabetes mellitus with hyperglycemia, without long-term current use of insulin (HCC)  • Hypothyroidism  • Overactive bladder  • Ambulatory dysfunction  • Chronic pain disorder  • Hypertensive urgency      Admitting Diagnosis: Orthostatic hypotension [I95.1]  Neck pain [M54.2]  Back pain [M54.9]  Injury [T14.90XA]  Tachycardia [R00.0]  Generalized weakness [R53.1]  Ambulatory dysfunction [R26.2]  Age/Sex: 78 y.o. female  Admission Orders:  Scheduled Medications:  acetaminophen, 975 mg, Oral, Q8H [facility]  aspirin, 81 mg, Oral, Daily  atorvastatin, 40 mg, Oral, Daily  enoxaparin, 40 mg, Subcutaneous, Daily  insulin lispro, 1-5 Units, Subcutaneous, TID AC  levothyroxine, 37.5 mcg, Oral, Early Morning  lidocaine, 1 patch, Topical, Daily  lisinopril, 20 mg, Oral, Daily  melatonin, 6 mg, Oral, HS  metoprolol succinate, 25 mg, Oral, Daily  oxybutynin, 10 mg, Oral, Daily  pantoprazole, 40 mg, Oral, Early Morning  saccharomyces boulardii, 250 mg, Oral, BID      Continuous IV Infusions:     PRN Meds:  albuterol, 2 puff, Inhalation, Q6H PRN  baclofen, 10 mg, Oral, TID PRN  labetalol, 10 mg, Intravenous, Q6H PRN  metoclopramide, 10 mg, Oral, 4x Daily PRN  oxyCODONE-acetaminophen, 2 tablet, Oral, Q6H PRN          Network Utilization Review Department  ATTENTION: Please call with any questions or concerns to [phone] and carefully listen to the prompts so that you are directed to the right person. All voicemails are confidential.  Conner Kuo all requests for admission clinical reviews, approved or denied determinations and any other requests to dedicated fax number below belonging to the campus where the patient is receiving treatment.  List of dedicated fax numbers for the Facilities:  [address] [place] [address] FAX NUMBER   ADMISSION DENIALS (Administrative/Medical Necessity) [phone] [address] (Maternity/NICU/Pediatrics) [address] 1000 [facility] [phone]   1504 [facility] [address] [facility] [address] [address] [facility] [address] [phone]

## 2023-08-27 NOTE — ASSESSMENT & PLAN NOTE
· POA  -247/  · Orthostasis positive with labile BP at the ED receive 500 cc bolus LR  · Upon my evaluation patient does not seem to be hypovolemic  Blood Pressure: 158/79  · Acceptable at this time    · Plan  · Continue home Toprol-XL 25 mg once a day  · Continue lisinopril 20 mg once a days  · Medication reconciliation with paperwork provided by patient does not take hydrochlorothiazide or chlorthalidone as written in epic.   · Recheck orthostasis the morning

## 2023-08-27 NOTE — H&P
[address] [facility]  H&P  Name: Diego Munoz 78 y.o. female I MRN: [id number]  Unit/Bed#: S -01 I Date of Admission: 8/26/2023   Date of Service: 8/27/2023 I Hospital Day: 0      Assessment/Plan   * Ambulatory dysfunction  Assessment & Plan  · POA status post fall. Ambulates with assisted rollator walker at baseline fell back hitting her Head, neck and back with coffee table. Endorse had a fall last week similar where she fell back from rollator walker. · Prior to fall denied any lightheadedness or dizziness as well as no palpitations however during orthostasis at the ED felt dizzy upon standing. · At the ED blood pressure was noted significantly elevated despite home medications taken today however subsequently labile blood pressures with associated tachycardia. · ECG noted sinus tachycardia heart rate 105 with LBBB however this is not new compared to last  February 2023    · Plan  · Continue telemetry however low suspicious for cardiac origin mostly mechanical falls  · Recheck orthostasis although does not seem to be hypovolemic at this time. · Continue home lisinopril 20 mg once a day  · Resume home Toprol-XL 25 mg once a day consider increase dose if remains tachycardic  · Vital signs per protocol  · PT OT  · Fall precautions    Hypertensive urgency  Assessment & Plan  · POA  -247/  · Orthostasis positive with labile BP at the ED receive 500 cc bolus LR  · Upon my evaluation patient does not seem to be hypovolemic  Blood Pressure: 158/79  · Acceptable at this time    · Plan  · Continue home Toprol-XL 25 mg once a day  · Continue lisinopril 20 mg once a days  · Medication reconciliation with paperwork provided by patient does not take hydrochlorothiazide or chlorthalidone as written in epic.   · Recheck orthostasis the morning  · Labetalol 10 mg as needed every 6 hours for sustained SBP >180/[id number]    Anemia  Assessment & Plan  · Hx Iron deficiency anemia  · POA Hb 11.2 baseline seems to be 10-11  · No over bleeding  · CBC am    Abnormal urinalysis  Assessment & Plan  · POA UA moderate leukocyte with associated epithelial cell contaminate sample  · Patient denies any symptoms  · We will monitor off antibiotic    Type 2 diabetes mellitus with hyperglycemia, without long-term current use of insulin [facility])  Assessment & Plan  Lab Results   Component Value Date    HGBA1C 6.6 (H) 06/09/2023       Recent Labs     08/26/23  2237   POCGLU 145*       Blood Sugar Average: Last 72 hrs:  · (P) 145Holding home metformin   · Continue sliding scale  · Continue carbohydrate controlled diet. Overactive bladder  Assessment & Plan  · Continue oxybutynin 10 mg once a day per hospital formulary for [place] 60 mg    Chronic pain disorder  Assessment & Plan  · History thoracolumbar spinal stenosis  · Negative pan CT  · Continue home Percocet  milligram every 4 hours as needed  · Continue Tylenol schedule 925 mg every 8 hours  · Continue aqua K after fall  · Continue Lidoderm patch for neck pain. Hypothyroidism  Assessment & Plan  · Continue home levothyroxine 37.5 milligram once a day       VTE Pharmacologic Prophylaxis: VTE Score: 5 High Risk (Score >/= 5) - Pharmacological DVT Prophylaxis Ordered: enoxaparin (Lovenox). Sequential Compression Devices Ordered. Code Status: Level 1 - Full Code patient  Discussion with family: Patient declined call to . Anticipated Length of Stay: Patient will be admitted on an observation basis with an anticipated length of stay of less than 2 midnights secondary to Ambulatory dysfunction. Chief Complaint: Multiple falls    History of Present Illness: Franko Macias is a 78 y.o. female with a PMH of chronic pain syndrome, COPD, hypertension, hyperlipidemia, type 2 diabetes not on insulin, overactive bladder, hypothyroidism who presents after falling backwards and striking her upper and mid back on the coffee table.   Patient endorsed that this is the second time she has swelling in the past 1 to 2 weeks. Endorsed that stool output took 3-5 steps on her RW then she fell backwards however denied any lightheadedness, dizziness, palpitations during the event. Patient endorsed that the fall was similar to the 1 week and a half for which she did not had any prodromal symptoms. Patient endorsed that lives with her son on a one-story house only 3 steps upon entrance, participates on physical therapy in the outpatient setting and most recently was told to increase her PT OT for 1-2 sessions. Upon my evaluation patient complaining of soreness throughout the neck and back however denied any headaches, blurry vision, chest pain, abdominal pain, urinary symptoms as well as no lightheadedness, dizziness, weakness or numbness. At the ED blood noted elevated with labile readings positive orthostasis for which received 1 bolus of 500 cc LR. Patient CT scans negative for acute pathology. Patient will be admitted to [facility] for further management evaluation    Review of Systems:  Review of Systems   Constitutional: Negative for chills and fever. HENT: Negative for ear pain and sore throat. Eyes: Negative for pain and visual disturbance. Respiratory: Negative for cough and shortness of breath. Cardiovascular: Negative for chest pain and palpitations. Gastrointestinal: Negative for abdominal distention, abdominal pain, constipation, diarrhea, nausea and vomiting. Genitourinary: Negative for difficulty urinating, dysuria and hematuria. Musculoskeletal: Positive for back pain, gait problem and neck pain. Negative for arthralgias. Skin: Negative for color change and rash. Neurological: Negative for dizziness, seizures, syncope, weakness, light-headedness and numbness. All other systems reviewed and are negative.       Past Medical and Surgical History:   Past Medical History:   Diagnosis Date   • Acid reflux    • Anemia     hx of iron-deficient   • Anxiety    • Arthritis    • Asthma     last needed inhaler last year 2020   • Basal cell carcinoma     upper lip   • Chronic narcotic dependence (HCC)    • Chronic pain    • Colon polyp    • Cystocele    • Diabetes mellitus ([address])     stable   • Disease of thyroid gland     hypothyroidism   • Diverticulosis    • Dizziness     at times   • Dysfunctional uterine bleeding     last assessed - [id number]   • Dysphagia    • Fibromyalgia    • Gastric ulcer    • Gastroparesis    • History of colonic polyps     last assessed - [id number]   • History of gastroesophageal reflux (GERD)    • Hypercholesterolemia    • Hyperlipidemia    • Hypertension    • IBS (irritable bowel syndrome)    • Pneumobilia 06/18/2022   • Post laminectomy syndrome    • S/P insertion of spinal cord stimulator 07/18/2018   • Seasonal allergies    • Shortness of breath     exertional   • Spinal stenosis    • Status post lumbar spinal fusion 03/16/2018   • Stroke [facility])     pt states slight stroke March 2022       Past Surgical History:   Procedure Laterality Date   • APPENDECTOMY     • BACK SURGERY     • BREAST CYST EXCISION Left    • CHOLECYSTECTOMY     • COLONOSCOPY     • ESOPHAGOGASTRODUODENOSCOPY N/A 09/28/2016    Procedure: ESOPHAGOGASTRODUODENOSCOPY (EGD); Surgeon: Genetta Klinefelter, MD;  Location: [facility]; Service:    • HERNIA REPAIR     • HYSTERECTOMY      TTAH-BSO age 25   • LAMINECTOMY     • LUMBAR LAMINECTOMY     • OOPHORECTOMY      age 25   • MI ARTHRODESIS POSTERIOR/PSTLAT TQ 1NTRSPC THORACIC N/A 06/04/2018    Procedure: Reopening of lumbar incision for T12-L5 posterior instrumented fixation and fusion and T12-L4 posterior decompression;  Surgeon: Faye Merchant MD;  Location: [facility];  Service: Neurosurgery   • MI COLONOSCOPY FLX DX W/COLLJ SPEC WHEN PFRMD N/A 03/02/2016    Procedure: EGD AND COLONOSCOPY;  Surgeon: Genetta Klinefelter, MD;  Location: [facility];   Service: Gastroenterology   • MI DILATION ESOPH UNGUIDED SOUND/BOUGIE 1/MULT PASS N/A 09/28/2016    Procedure: DILATATION ESOPHAGEAL;  Surgeon: Johnathan Tinoco MD;  Location: [facility]; Service: Gastroenterology   • IN ESOPHAGOGASTRODUODENOSCOPY TRANSORAL DIAGNOSTIC N/A 07/18/2016    Procedure: ESOPHAGOGASTRODUODENOSCOPY (EGD); Surgeon: Johnathan Tinoco MD;  [facility]: [facility]; Service: Gastroenterology   • IN INSJ/RPLCMT SPI NPGR DIR/INDUXIVE COUPLING Left 06/04/2018    Procedure: removal of left buttock implantable pulse generator and placement of new  implantable pulse generator;  Surgeon: Suyapa Barton MD;  Location: [facility];  Service: Neurosurgery       Meds/Allergies:  Prior to Admission medications    Medication Sig Start Date End Date Taking?  Authorizing Provider   aspirin 81 mg chewable tablet Chew 1 tablet (81 mg total) daily 8/9/22 8/26/23 Yes NANY Melendez   atorvastatin (LIPITOR) 40 mg tablet TAKE ONE TABLET BY MOUTH ONCE DAILY 11/22/22  Yes NANY Melendez   baclofen 10 mg tablet TAKE ONE TABLET BY MOUTH THREE TIMES DAILY AS NEEDED FOR MUSCLE SPASM 5/15/23  Yes NANY Melendez   escitalopram (Lexapro) 20 mg tablet Take 0.5 tablets (10 mg total) by mouth daily  Patient taking differently: Take 5 mg by mouth daily after dinner 6/29/22  Yes NANY Melendez   levothyroxine 25 mcg tablet Take 1.5 tablets (37.5 mcg total) by mouth daily 8/9/22 8/26/23 Yes NANY Melendez   lisinopril (ZESTRIL) 20 mg tablet TAKE ONE TABLET BY MOUTH ONCE DAILY 5/30/23  Yes NANY Melendez   meclizine (ANTIVERT) 25 mg tablet Take 1 tablet (25 mg total) by mouth 4 (four) times a day as needed for dizziness 3/26/23 8/26/23 Yes Venice Baires DO   metFORMIN (GLUCOPHAGE) 1000 MG tablet Take 1 tablet (1,000 mg total) by mouth 2 (two) times a day with meals 2/10/23  Yes NANY Melendez   metoclopramide (REGLAN) 10 mg tablet Take 1 tablet (10 mg total) by mouth 4 (four) times a day as needed (Nausea and vomiting) 9/9/22  Yes NANY Melendez metoprolol succinate (TOPROL-XL) 25 mg 24 hr tablet Take 25 mg by mouth daily   Yes Historical Provider, MD   Milnacipran HCl (Savella) 100 MG TABS Take 1 tablet (100 mg total) by mouth daily 3/5/23  Yes Herbert Baires DO   oxyCODONE-acetaminophen (PERCOCET)  mg per tablet Take 1 tablet by mouth every 6 (six) hours as needed for severe pain Max Daily Amount: 4 tablets 5/24/23  Yes NANY Francisco   pantoprazole (PROTONIX) 40 mg tablet Take 1 tablet (40 mg total) by mouth every 12 (twelve) hours 7/18/22 8/26/23 Yes Al Noel MD   saccharomyces boulardii (FLORASTOR) 250 mg capsule Take 1 capsule (250 mg total) by mouth 2 (two) times a day 6/21/22  Yes Chel Hughes MD   trospium (SANCTURA XR) 60 mg 24 hr capsule Take 1 capsule (60 mg total) by mouth daily before breakfast 4/3/23  Yes NANY Francisco   albuterol (PROVENTIL HFA,VENTOLIN HFA) 90 mcg/act inhaler Inhale 2 puffs every 6 (six) hours as needed for wheezing or shortness of breath 6/29/22   NANY Francisco   Blood Glucose Monitoring Suppl (OneTouch Verio Reflect) w/Device KIT Check blood sugars twice daily. Please substitute with appropriate alternative as covered by patient's insurance.  Dx: E11.65 2/20/23   NANY Francisco   chlorthalidone (HYGROTEN) 15 MG tablet Take 1.5 tablets (22.5 mg total) by mouth daily  Patient not taking: Reported on 8/26/2023 2/17/23   NANY Francisco   cholestyramine Wyona Gouge) 4 g packet Take 1 packet (4 g total) by mouth 3 (three) times a day with meals  Patient not taking: Reported on 3/8/2023 10/27/22   Herbert Baires DO   doxycycline hyclate (VIBRA-TABS) 100 mg tablet Take 100 mg by mouth 2 (two) times a day  Patient not taking: Reported on 8/26/2023 6/16/23   Historical Provider, MD   gabapentin (NEURONTIN) 300 mg capsule Take 1 capsule (300 mg total) by mouth 3 (three) times a day  Patient not taking: Reported on 8/26/2023 10/24/22   NANY Francisco   glucose blood (OneTouch Verio) test strip Check blood sugars twice daily. Please substitute with appropriate alternative as covered by patient's insurance. Dx: E11.65 2/20/23   Ashleydavid ThomasonNANY   hydrochlorothiazide (HYDRODIURIL) 12.5 mg tablet Take 2 tablets (25 mg total) by mouth daily as needed (edema/swelling)  Patient not taking: Reported on 8/26/2023 1/24/23   Ashley PaddyNANY   hyoscyamine (ANASPAZ,LEVSIN) 0.125 MG tablet Take 1 tablet (0.125 mg total) by mouth every 4 (four) hours as needed for cramping 5/26/23   NANY Zhu   latanoprost (XALATAN) 0.005 % ophthalmic solution Administer 1 drop to both eyes daily at bedtime 10/2/20 6/29/22  Cyrus Baires,    metoprolol tartrate (LOPRESSOR) 25 mg tablet Take 0.5 tablets (12.5 mg total) by mouth every 12 (twelve) hours  Patient not taking: Reported on 8/26/2023 1/4/23   NANY Zhu   nystatin (MYCOSTATIN) powder Apply topically 2 (two) times a day  Patient not taking: Reported on 8/26/2023 6/21/22   Neela Seo MD   OneTouch Delica Lancets 69A MISC Check blood sugars twice daily. Please substitute with appropriate alternative as covered by patient's insurance. Dx: E11.65 2/20/23   AshleyNANY Shaver   prednisoLONE (ORAPRED) 15 mg/5 mL oral solution 3 tsp twice/day for 2 days, 2 tsp twice/day for 2 days, 1 tsp twice/day x2 days than 1/2 tsp daily x 2 days  Patient not taking: Reported on 8/26/2023 12/22/22   NANY Zhu   zolpidem (AMBIEN) 5 mg tablet Take 1 tablet (5 mg total) by mouth daily at bedtime as needed for sleep  Patient not taking: Reported on 8/26/2023 3/8/23   NANY Zhu   sucralfate (CARAFATE) 1 g tablet Take 1 tablet (1 g total) by mouth 4 (four) times a day 7/18/22 10/13/22  Sammie Kennedy MD     I have reviewed home medications with patient as well as documentation provided. .     Allergies:    Allergies   Allergen Reactions   • Penicillins Anaphylaxis, Hives and Other (See Comments) Other reaction(s): Unknown Reaction   • Sulfa Antibiotics Anaphylaxis and Other (See Comments)     Other reaction(s): Unknown Reaction   • Aspartame - Food Allergy Other (See Comments) and Hypertension     Slurred speech, weakness, stroke sx   • Iodinated Contrast Media Hives     Pt has taken prep prior for contrast and has not had any break through reaction   • Keflex [Cephalexin] Hives       Social History:  Marital Status:     Occupation: Retired  Patient Pre-hospital Living Situation: With other family member: Son  Patient Pre-hospital Level of Mobility: walks with walker  Patient Pre-hospital Diet Restrictions: None  Substance Use History:   Social History     Substance and Sexual Activity   Alcohol Use Not Currently    Comment: Denied history of alcohol use     Social History     Tobacco Use   Smoking Status Former   • Types: Cigarettes   • Quit date: 1970   • Years since quittin.6   Smokeless Tobacco Never   Tobacco Comments    Denied history of current ever day smoker, Former smoker and Never smoker all documented in [employer]     Social History     Substance and Sexual Activity   Drug Use No    Comment: Denied history of drug use       Family History:  Family History   Problem Relation Age of Onset   • Lung cancer Mother 55   • Pulmonary embolism Father    • No Known Problems Sister    • No Known Problems Daughter    • No Known Problems Daughter    • Stroke Maternal Grandmother    • Heart attack Maternal Grandfather    • No Known Problems Paternal Grandmother    • No Known Problems Paternal Grandfather    • No Known Problems Maternal Aunt    • Diabetes Family         Diabetes mellitus   • Hypertension Family    • Stroke Family         Stroke complications       Physical Exam:     Vitals:   Blood Pressure: 158/79 (23)  Pulse: (!) 107 (23)  Temperature: 98.3 °F (36.8 °C) (23 1521)  Temp Source: Oral (23 1521)  Respirations: 18 (23)  Height: 5' (152.4 cm) (08/26/23 1521)  Weight - Scale: 72.6 kg (160 lb) (08/26/23 1521)  SpO2: 98 % (08/26/23 2145)    Physical Exam  Vitals and nursing note reviewed. Constitutional:       General: She is not in acute distress. Appearance: She is well-developed. She is not ill-appearing. HENT:      Head: Normocephalic and atraumatic. Right Ear: External ear normal.      Left Ear: External ear normal.      Nose: Nose normal.      Mouth/Throat:      Mouth: Mucous membranes are moist.   Eyes:      General: No scleral icterus. Extraocular Movements: Extraocular movements intact. Conjunctiva/sclera: Conjunctivae normal.      Pupils: Pupils are equal, round, and reactive to light. Cardiovascular:      Rate and Rhythm: Normal rate and regular rhythm. Pulses: Normal pulses. Heart sounds: Normal heart sounds. No murmur heard. Pulmonary:      Effort: Pulmonary effort is normal. No respiratory distress. Breath sounds: Normal breath sounds. Abdominal:      General: Bowel sounds are normal. There is no distension. Palpations: Abdomen is soft. Tenderness: There is no abdominal tenderness. Musculoskeletal:         General: No swelling. Cervical back: Normal range of motion and neck supple. Right lower leg: Edema present. Left lower leg: Edema present. Comments: Bilateral lower extremity edema up to ankles +1   Skin:     General: Skin is warm and dry. Capillary Refill: Capillary refill takes less than 2 seconds. Neurological:      General: No focal deficit present. Mental Status: She is alert. Comments: Tender to palpation neck paraspinal muscles low back.    Psychiatric:         Mood and Affect: Mood normal.          Additional Data:     Lab Results:  Results from last 7 days   Lab Units 08/26/23  1556   WBC Thousand/uL 9.19   HEMOGLOBIN g/dL 11.2*   HEMATOCRIT % 36.4   PLATELETS Thousands/uL 262   NEUTROS PCT % 55   LYMPHS PCT % 20   MONOS PCT % 8 EOS PCT % 16*     Results from last 7 days   Lab Units 08/26/23  1556   SODIUM mmol/L 140   POTASSIUM mmol/L 3.9   CHLORIDE mmol/L 105   CO2 mmol/L 27   BUN mg/dL 17   CREATININE mg/dL 0.92   ANION GAP mmol/L 8   CALCIUM mg/dL 9.3   ALBUMIN g/dL 3.6   TOTAL BILIRUBIN mg/dL 0.24   ALK PHOS U/L 102   ALT U/L 8   AST U/L 15   GLUCOSE RANDOM mg/dL 138     Results from last 7 days   Lab Units 08/26/23  1556   INR  0.86     Results from last 7 days   Lab Units 08/26/23  2237   POC GLUCOSE mg/dl 145*         Results from last 7 days   Lab Units 08/26/23  1556   LACTIC ACID mmol/L 1.6       Lines/Drains:  Invasive Devices     Peripheral Intravenous Line  Duration           Peripheral IV 08/26/23 Left Antecubital <1 day                    Imaging: Reviewed radiology reports from this admission including: chest CT scan, abdominal/pelvic CT, CT head and xray(s)  XR elbow 3+ vw LEFT   ED Interpretation by Melba Rebolledo MD (08/26 1850)   Per my independent interpretation: No acute osseous abnormality      CT head wo contrast   Final Result by Dottie Dickson MD (08/26 1730)      No acute intracranial abnormality. Chronic microangiopathic changes. Workstation performed: [id number]         CT spine cervical without contrast   Final Result by Dottie Dickson MD (08/26 1734)      No cervical spine fracture or traumatic malalignment. Workstation performed: [id number]         CT chest abdomen pelvis wo contrast   Final Result by Dottie Dickson MD (08/26 1800)      No evidence of acute traumatic injury throughout the chest, abdomen or pelvis. Workstation performed: [id number]         CT recon only thoracolumbar   Final Result by Dottie Dickson MD (08/26 1749)      No fracture or traumatic subluxation. Extensive T12-L5 posterior fusion appears intact. Workstation performed: [id number]             EKG and Other Studies Reviewed on Admission:   · EKG: Sinus Tachycardia. . ** Please Note: This note has been constructed using a voice recognition system.  **

## 2023-08-27 NOTE — ED PROVIDER NOTES
History  Chief Complaint   Patient presents with   • Fall     Arrives via EMS from home s/p fall @ 1230 today. Patient reports feeling slight dizziness while ambulating, causing her to fall backwards, striking back and upper body on wooden coffee table. EMS reports coffee table completed destroyed from force of fall. (-) headstrike/LOC (-) thinners. Ambulatory after fall. C/o lower back pain radiating to shoulder. Hx multiple back surgeries. May León is an 78-year-old female with history of HTN, HLD, DM, and prior thoracic and lumbar spine surgeries who presents after a fall. States that she was up and walking with the use of her walker, notes that she was asymptomatic and fell backwards for no apparent reason, striking her neck and upper back on her coffee table. Denies head strike or LOC, is not on aspirin or anticoagulants. Had immediate onset of neck, upper back, and right scapula pain, also notes bilateral rib pain. States that pain is worst in her upper back and right scapula, is worse with movement, better at rest, does not radiate into her arms or legs, is currently moderate in severity. Also complains of mild left elbow pain(states he broke a coaster with it during the fall but did not sustain an abrasion or laceration), is able to move her upper and lower extremities without pain. Also endorses nausea. Daughter, Ramiro Joyner, at bedside, is concerned given that his is her second fall in a week and a half. Sustained a right lower abdominal contusion in the prior fall, states that she also fell backwards without warning while ambulating with walker during that fall as well, was not evaluated medically after that fall. Denies fever, chills, weakness, lightheadedness, vision changes, chest pain, shortness of breath, cough, abdominal pain, vomiting, diarrhea, constipation, flank pain, dysuria, hematuria, peripheral edema, unilateral calf pain or swelling, wounds, or rash.           Prior to Admission Medications   Prescriptions Last Dose Informant Patient Reported? Taking? Blood Glucose Monitoring Suppl (OneTouch Verio Reflect) w/Device KIT   No No   Sig: Check blood sugars twice daily. Please substitute with appropriate alternative as covered by patient's insurance. Dx: E11.65   Milnacipran HCl (Savella) 100 MG TABS 8/26/2023  No Yes   Sig: Take 1 tablet (100 mg total) by mouth daily   OneTouch Delica Lancets 91W MISC   No No   Sig: Check blood sugars twice daily. Please substitute with appropriate alternative as covered by patient's insurance.  Dx: E11.65   albuterol (PROVENTIL HFA,VENTOLIN HFA) 90 mcg/act inhaler More than a month Self No No   Sig: Inhale 2 puffs every 6 (six) hours as needed for wheezing or shortness of breath   aspirin 81 mg chewable tablet 8/26/2023 Self No Yes   Sig: Chew 1 tablet (81 mg total) daily   atorvastatin (LIPITOR) 40 mg tablet 8/25/2023  No Yes   Sig: TAKE ONE TABLET BY MOUTH ONCE DAILY   baclofen 10 mg tablet Past Week  No Yes   Sig: TAKE ONE TABLET BY MOUTH THREE TIMES DAILY AS NEEDED FOR MUSCLE SPASM   chlorthalidone (HYGROTEN) 15 MG tablet Not Taking  No No   Sig: Take 1.5 tablets (22.5 mg total) by mouth daily   Patient not taking: Reported on 8/26/2023   cholestyramine (QUESTRAN) 4 g packet Not Taking  No No   Sig: Take 1 packet (4 g total) by mouth 3 (three) times a day with meals   Patient not taking: Reported on 3/8/2023   doxycycline hyclate (VIBRA-TABS) 100 mg tablet Not Taking  Yes No   Sig: Take 100 mg by mouth 2 (two) times a day   Patient not taking: Reported on 8/26/2023   escitalopram (Lexapro) 20 mg tablet 8/25/2023 Self No Yes   Sig: Take 0.5 tablets (10 mg total) by mouth daily   Patient taking differently: Take 5 mg by mouth daily after dinner   gabapentin (NEURONTIN) 300 mg capsule Not Taking  No No   Sig: Take 1 capsule (300 mg total) by mouth 3 (three) times a day   Patient not taking: Reported on 8/26/2023   glucose blood (OneTouch Verio) test strip No No   Sig: Check blood sugars twice daily. Please substitute with appropriate alternative as covered by patient's insurance.  Dx: E11.65   hydrochlorothiazide (HYDRODIURIL) 12.5 mg tablet Not Taking  No No   Sig: Take 2 tablets (25 mg total) by mouth daily as needed (edema/swelling)   Patient not taking: Reported on 8/26/2023   hyoscyamine (ANASPAZ,LEVSIN) 0.125 MG tablet   No No   Sig: Take 1 tablet (0.125 mg total) by mouth every 4 (four) hours as needed for cramping   latanoprost (XALATAN) 0.005 % ophthalmic solution  Self No No   Sig: Administer 1 drop to both eyes daily at bedtime   levothyroxine 25 mcg tablet 8/26/2023 Self No Yes   Sig: Take 1.5 tablets (37.5 mcg total) by mouth daily   lisinopril (ZESTRIL) 20 mg tablet 8/26/2023  No Yes   Sig: TAKE ONE TABLET BY MOUTH ONCE DAILY   meclizine (ANTIVERT) 25 mg tablet Past Week  No Yes   Sig: Take 1 tablet (25 mg total) by mouth 4 (four) times a day as needed for dizziness   metFORMIN (GLUCOPHAGE) 1000 MG tablet 8/26/2023  No Yes   Sig: Take 1 tablet (1,000 mg total) by mouth 2 (two) times a day with meals   metoclopramide (REGLAN) 10 mg tablet 8/26/2023  No Yes   Sig: Take 1 tablet (10 mg total) by mouth 4 (four) times a day as needed (Nausea and vomiting)   metoprolol succinate (TOPROL-XL) 25 mg 24 hr tablet 8/26/2023  Yes Yes   Sig: Take 25 mg by mouth daily   metoprolol tartrate (LOPRESSOR) 25 mg tablet Not Taking  No No   Sig: Take 0.5 tablets (12.5 mg total) by mouth every 12 (twelve) hours   Patient not taking: Reported on 8/26/2023   nystatin (MYCOSTATIN) powder Not Taking Self No No   Sig: Apply topically 2 (two) times a day   Patient not taking: Reported on 8/26/2023   oxyCODONE-acetaminophen (PERCOCET)  mg per tablet 8/26/2023  No Yes   Sig: Take 1 tablet by mouth every 6 (six) hours as needed for severe pain Max Daily Amount: 4 tablets   pantoprazole (PROTONIX) 40 mg tablet 8/26/2023 Self No Yes   Sig: Take 1 tablet (40 mg total) by mouth every 12 (twelve) hours   predniSONE 50 mg tablet Not Taking  Yes No   Sig: Take 50 mg by mouth daily   Patient not taking: Reported on 8/26/2023   prednisoLONE (ORAPRED) 15 mg/5 mL oral solution Not Taking  No No   Sig: 3 tsp twice/day for 2 days, 2 tsp twice/day for 2 days, 1 tsp twice/day x2 days than 1/2 tsp daily x 2 days   Patient not taking: Reported on 8/26/2023   saccharomyces boulardii (FLORASTOR) 250 mg capsule 8/26/2023 Self No Yes   Sig: Take 1 capsule (250 mg total) by mouth 2 (two) times a day   trospium (SANCTURA XR) 60 mg 24 hr capsule 8/25/2023  No Yes   Sig: Take 1 capsule (60 mg total) by mouth daily before breakfast   zolpidem (AMBIEN) 5 mg tablet Not Taking  No No   Sig: Take 1 tablet (5 mg total) by mouth daily at bedtime as needed for sleep   Patient not taking: Reported on 8/26/2023      Facility-Administered Medications: None       Past Medical History:   Diagnosis Date   • Acid reflux    • Anemia     hx of iron-deficient   • Anxiety    • Arthritis    • Asthma     last needed inhaler last year 2020   • Basal cell carcinoma     upper lip   • Chronic narcotic dependence (HCC)    • Chronic pain    • Colon polyp    • Cystocele    • Diabetes mellitus ([address])     stable   • Disease of thyroid gland     hypothyroidism   • Diverticulosis    • Dizziness     at times   • Dysfunctional uterine bleeding     last assessed - [id number]   • Dysphagia    • Fibromyalgia    • Gastric ulcer    • Gastroparesis    • History of colonic polyps     last assessed - [id number]   • History of gastroesophageal reflux (GERD)    • Hypercholesterolemia    • Hyperlipidemia    • Hypertension    • IBS (irritable bowel syndrome)    • Pneumobilia 06/18/2022   • Post laminectomy syndrome    • S/P insertion of spinal cord stimulator 07/18/2018   • Seasonal allergies    • Shortness of breath     exertional   • Spinal stenosis    • Status post lumbar spinal fusion 03/16/2018   • Stroke [facility])     pt states slight stroke March 2022 Past Surgical History:   Procedure Laterality Date   • APPENDECTOMY     • BACK SURGERY     • BREAST CYST EXCISION Left    • CHOLECYSTECTOMY     • COLONOSCOPY     • ESOPHAGOGASTRODUODENOSCOPY N/A 09/28/2016    Procedure: ESOPHAGOGASTRODUODENOSCOPY (EGD); Surgeon: Simon Mack MD;  [facility]: [facility]; Service:    • HERNIA REPAIR     • HYSTERECTOMY      TTAH-BSO age 25   • LAMINECTOMY     • LUMBAR LAMINECTOMY     • OOPHORECTOMY      age 25   • DE ARTHRODESIS POSTERIOR/PSTLAT TQ 1NTRSPC THORACIC N/A 06/04/2018    Procedure: Reopening of lumbar incision for T12-L5 posterior instrumented fixation and fusion and T12-L4 posterior decompression;  Surgeon: Saravanan Calixto MD;  [facility];  Service: Neurosurgery   • DE COLONOSCOPY FLX DX W/COLLJ SPEC WHEN PFRMD N/A 03/02/2016    Procedure: EGD AND COLONOSCOPY;  Surgeon: Simon Mack MD;  [facility]: [facility]; Service: Gastroenterology   • DE DILATION [address] [facility] UNGUIDED SOUND/BOUGIE 1/MULT PASS N/A 09/28/2016    Procedure: DILATATION ESOPHAGEAL;  Surgeon: Simon Mack MD;  [facility]: [facility]; Service: Gastroenterology   • DE ESOPHAGOGASTRODUODENOSCOPY TRANSORAL DIAGNOSTIC N/A 07/18/2016    Procedure: ESOPHAGOGASTRODUODENOSCOPY (EGD); Surgeon: Simon Mack MD;  [facility]: [facility];   Service: Gastroenterology   • DE INSJ/RPLCMT SPI NPGR DIR/INDUXIVE COUPLING Left 06/04/2018    Procedure: removal of left buttock implantable pulse generator and placement of new  implantable pulse generator;  Surgeon: Saravanan Calixto MD;  [facility];  Service: Neurosurgery       Family History   Problem Relation Age of Onset   • Lung cancer Mother 55   • Pulmonary embolism Father    • No Known Problems Sister    • No Known Problems Daughter    • No Known Problems Daughter    • Stroke Maternal Grandmother    • Heart attack Maternal Grandfather    • No Known Problems Paternal Grandmother    • No Known Problems Paternal Grandfather    • No Known Problems Maternal Aunt    • Diabetes Family         Diabetes mellitus   • Hypertension Family    • Stroke Family         Stroke complications     I have reviewed and agree with the history as documented. E-Cigarette/Vaping   • E-Cigarette Use Never User      E-Cigarette/Vaping Substances   • Nicotine No    • THC No    • CBD No    • Flavoring No    • Other No    • Unknown No      Social History     Tobacco Use   • Smoking status: Former     Types: Cigarettes     Quit date: 1970     Years since quittin.6   • Smokeless tobacco: Never   • Tobacco comments:     Denied history of current ever day smoker, Former smoker and Never smoker all documented in [employer]   Vaping Use   • Vaping Use: Never used   Substance Use Topics   • Alcohol use: Not Currently     Comment: Denied history of alcohol use   • Drug use: No     Comment: Denied history of drug use        Review of Systems   Constitutional: Negative. Negative for chills, diaphoresis, fatigue and fever. HENT: Negative. Negative for congestion, rhinorrhea, sneezing and sore throat. Eyes: Negative. Negative for visual disturbance. Respiratory: Negative. Negative for cough and shortness of breath. Cardiovascular: Negative. Negative for palpitations and leg swelling. Bilateral posterolateral rib pain but no anterior chest pain. Gastrointestinal: Positive for nausea. Negative for abdominal distention, abdominal pain, constipation, diarrhea and vomiting. Endocrine: Negative. Genitourinary: Negative. Negative for decreased urine volume, difficulty urinating, dysuria, flank pain, hematuria and urgency. Musculoskeletal: Positive for back pain and neck pain. Negative for myalgias. Skin: Negative. Negative for color change, pallor, rash and wound. Allergic/Immunologic: Negative. Neurological: Negative. Negative for dizziness, syncope, weakness, light-headedness, numbness and headaches. Hematological: Negative. Does not bruise/bleed easily. Psychiatric/Behavioral: Negative. Negative for confusion. All other systems reviewed and are negative. Physical Exam  ED Triage Vitals   Temperature Pulse Respirations Blood Pressure SpO2   08/26/23 1521 08/26/23 1515 08/26/23 1515 08/26/23 1515 08/26/23 1515   98.3 °F (36.8 °C) 104 18 (!) 150/108 96 %      Temp Source Heart Rate Source Patient Position - Orthostatic VS BP Location FiO2 (%)   08/26/23 1521 08/26/23 1515 08/26/23 1515 08/26/23 1515 --   Oral Monitor Sitting Right arm       Pain Score       08/26/23 1515       8             Orthostatic Vital Signs  Vitals:    08/26/23 2049 08/26/23 2052 08/26/23 2055 08/26/23 2145   BP: 165/83 135/73 (!) 185/89 158/79   Pulse: (!) 107 (!) 119 (!) 123 (!) 107   Patient Position - Orthostatic VS: Sitting - Orthostatic VS Standing - Orthostatic VS Standing for 3 minutes - Orthostatic VS Lying       Physical Exam  Vitals and nursing note reviewed. Exam conducted with a chaperone present. Constitutional:       General: She is not in acute distress. Appearance: Normal appearance. She is not ill-appearing or diaphoretic. HENT:      Head: Normocephalic and atraumatic. Right Ear: Tympanic membrane and external ear normal.      Left Ear: Tympanic membrane and external ear normal.      Nose: Nose normal. No congestion. Mouth/Throat:      Mouth: Mucous membranes are moist.      Pharynx: Oropharynx is clear. Eyes:      General: No scleral icterus. Right eye: No discharge. Left eye: No discharge. Extraocular Movements: Extraocular movements intact. Conjunctiva/sclera: Conjunctivae normal.      Pupils: Pupils are equal, round, and reactive to light. Cardiovascular:      Rate and Rhythm: Normal rate and regular rhythm. Pulses: Normal pulses. Heart sounds: Normal heart sounds. No murmur heard. No friction rub. No gallop. Pulmonary:      Effort: Pulmonary effort is normal. No respiratory distress.       Breath sounds: Normal breath sounds. Comments: Lateral rib pain bilaterally without underlying deformity or crepitus. No anterior chest wall pain to percussion. Chest:      Chest wall: Tenderness present. Abdominal:      General: Abdomen is flat. Bowel sounds are normal. There is no distension. Palpations: Abdomen is soft. Tenderness: There is no abdominal tenderness. There is no right CVA tenderness, left CVA tenderness, guarding or rebound. Comments: 3cm hematoma on right lower, lateral abdomen, non tender to palpation. Musculoskeletal:      Right shoulder: Normal.      Left shoulder: Normal.      Right upper arm: Normal.      Left upper arm: Normal.      Right elbow: Normal.      Left elbow: Normal.      Right forearm: Normal.      Left forearm: Normal.      Right wrist: Normal.      Left wrist: Normal.      Right hand: Normal. Normal capillary refill. Normal pulse. Left hand: Normal. Normal capillary refill. Normal pulse. Cervical back: Neck supple. Tenderness and bony tenderness present. No swelling, deformity or signs of trauma. Decreased range of motion. Thoracic back: Tenderness and bony tenderness present. No swelling, deformity or spasms. Decreased range of motion. Lumbar back: Tenderness and bony tenderness present. No swelling, deformity or spasms. Decreased range of motion. Negative right straight leg raise test and negative left straight leg raise test.        Back:       Right lower leg: No edema. Left lower leg: No edema. Comments: Tenderness to palpation over the lower cervical spine, entire thoracic spine, and upper lumbar spine without deformities or step-offs. Decreased spine ROM 2/2 pain. No paravertebral muscle spasm noted. Has full active, pain free ROM of bilateral upper and lower extremities. Upper and lower extremities neurovascularly intact. No saddle numbness on exam, rectal tone intact.     Lymphadenopathy:      Cervical: No cervical adenopathy. Skin:     General: Skin is warm and dry. Capillary Refill: Capillary refill takes less than 2 seconds. Coloration: Skin is not pale. Findings: No rash. Neurological:      General: No focal deficit present. Mental Status: She is alert.    Psychiatric:         Mood and Affect: Mood normal.         Behavior: Behavior normal.         ED Medications  Medications   enoxaparin (LOVENOX) subcutaneous injection 40 mg (has no administration in time range)   acetaminophen (TYLENOL) tablet 975 mg (has no administration in time range)   lidocaine (LIDODERM) 5 % patch 1 patch (1 patch Topical Medication Applied 8/26/23 2310)   albuterol (PROVENTIL HFA,VENTOLIN HFA) inhaler 2 puff (has no administration in time range)   aspirin chewable tablet 81 mg (has no administration in time range)   atorvastatin (LIPITOR) tablet 40 mg (has no administration in time range)   baclofen tablet 10 mg (has no administration in time range)   levothyroxine tablet 37.5 mcg (has no administration in time range)   metoclopramide (REGLAN) tablet 10 mg (has no administration in time range)   metoprolol succinate (TOPROL-XL) 24 hr tablet 25 mg (has no administration in time range)   oxyCODONE-acetaminophen (PERCOCET) 5-325 mg per tablet 2 tablet (has no administration in time range)   pantoprazole (PROTONIX) EC tablet 40 mg (has no administration in time range)   saccharomyces boulardii (FLORASTOR) capsule 250 mg (250 mg Oral Given 8/26/23 2310)   melatonin tablet 6 mg (6 mg Oral Given 8/26/23 2310)   oxybutynin (DITROPAN-XL) 24 hr tablet 10 mg (has no administration in time range)   lisinopril (ZESTRIL) tablet 20 mg (has no administration in time range)   insulin lispro (HumaLOG) 100 units/mL subcutaneous injection 1-5 Units (has no administration in time range)   labetalol (NORMODYNE) injection 10 mg (has no administration in time range)   ondansetron (ZOFRAN) injection 4 mg (4 mg Intravenous Given 8/26/23 1619) lactated ringers bolus 500 mL (0 mL Intravenous Stopped 8/26/23 2043)   oxyCODONE (ROXICODONE) split tablet 2.5 mg (2.5 mg Oral Given 8/26/23 2122)   acetaminophen (TYLENOL) tablet 975 mg (975 mg Oral Given 8/26/23 2123)       Diagnostic Studies  Results Reviewed     Procedure Component Value Units Date/Time    Urine culture [[id number]] Collected: 08/26/23 2221    Lab Status: In process Specimen: Urine Updated: 08/26/23 2221    HS Troponin I 4hr [[id number]]  (Normal) Collected: 08/26/23 2007    Lab Status: Final result Specimen: Blood from Arm, Left Updated: 08/26/23 2044     hs TnI 4hr 20 ng/L      Delta 4hr hsTnI 8 ng/L     Urine Microscopic [[id number]]  (Abnormal) Collected: 08/26/23 2007    Lab Status: Final result Specimen: Urine, Other Updated: 08/26/23 2035     RBC, UA 1-2 /hpf      WBC, UA 4-10 /hpf      Epithelial Cells Moderate /hpf      Bacteria, UA Occasional /hpf     UA w Reflex to Microscopic w Reflex to Culture [[id number]]  (Abnormal) Collected: 08/26/23 2007    Lab Status: Final result Specimen: Urine, Other Updated: 08/26/23 2030     Color, UA Light Yellow     Clarity, UA Turbid     Specific Gravity, UA 1.013     pH, UA 5.5     Leukocytes, UA Moderate     Nitrite, UA Negative     Protein, UA Negative mg/dl      Glucose, UA Negative mg/dl      Ketones, UA Negative mg/dl      Urobilinogen, UA <2.0 mg/dl      Bilirubin, UA Negative     Occult Blood, UA Negative    HS Troponin I 2hr [[id number]]  (Normal) Collected: 08/26/23 1756    Lab Status: Final result Specimen: Blood from Line Updated: 08/26/23 1832     hs TnI 2hr 15 ng/L      Delta 2hr hsTnI 3 ng/L     Blood culture #1 [[id number]] Collected: 08/26/23 1600    Lab Status:  In process Specimen: Blood from Hand, Right Updated: 08/26/23 1716    Comprehensive metabolic panel [[id number]] Collected: 08/26/23 1556    Lab Status: Final result Specimen: Blood from Arm, Left Updated: 08/26/23 1634     Sodium 140 mmol/L      Potassium 3.9 mmol/L      Chloride 105 mmol/L      CO2 27 mmol/L      ANION GAP 8 mmol/L      BUN 17 mg/dL      Creatinine 0.92 mg/dL      Glucose 138 mg/dL      Calcium 9.3 mg/dL      AST 15 U/L      ALT 8 U/L      Alkaline Phosphatase 102 U/L      Total Protein 6.4 g/dL      Albumin 3.6 g/dL      Total Bilirubin 0.24 mg/dL      eGFR 58 ml/min/1.73sq m     Narrative:      [facility] guidelines for Chronic Kidney Disease (CKD):   •  Stage 1 with normal or high GFR (GFR > 90 mL/min/1.73 square meters)  •  Stage 2 Mild CKD (GFR = 60-89 mL/min/1.73 square meters)  •  Stage 3A Moderate CKD (GFR = 45-59 mL/min/1.73 square meters)  •  Stage 3B Moderate CKD (GFR = 30-44 mL/min/1.73 square meters)  •  Stage 4 Severe CKD (GFR = 15-29 mL/min/1.73 square meters)  •  Stage 5 End Stage CKD (GFR <15 mL/min/1.73 square meters)  Note: GFR calculation is accurate only with a steady state creatinine    Protime-INR [[id number]]  (Normal) Collected: 08/26/23 1556    Lab Status: Final result Specimen: Blood from Arm, Left Updated: 08/26/23 1629     Protime 12.3 seconds      INR 0.86    APTT [[id number]]  (Abnormal) Collected: 08/26/23 1556    Lab Status: Final result Specimen: Blood from Arm, Left Updated: 08/26/23 1629     PTT 22 seconds     HS Troponin 0hr (reflex protocol) [[id number]]  (Normal) Collected: 08/26/23 1556    Lab Status: Final result Specimen: Blood from Arm, Left Updated: 08/26/23 1629     hs TnI 0hr 12 ng/L     Blood culture #2 [[id number]] Collected: 08/26/23 1620    Lab Status: In process Specimen: Blood from Arm, Right Updated: 08/26/23 1625    Lactic acid, plasma (w/reflex if result > 2.0) [[id number]]  (Normal) Collected: 08/26/23 1556    Lab Status: Final result Specimen: Blood from Arm, Left Updated: 08/26/23 1620     LACTIC ACID 1.6 mmol/L     Narrative:      Result may be elevated if tourniquet was used during collection.     CBC and differential [[id number]]  (Abnormal) Collected: 08/26/23 5839    Lab Status: Final result Specimen: Blood from Arm, Left Updated: 08/26/23 1607     WBC 9.19 Thousand/uL      RBC 3.94 Million/uL      Hemoglobin 11.2 g/dL      Hematocrit 36.4 %      MCV 92 fL      MCH 28.4 pg      MCHC 30.8 g/dL      RDW 13.6 %      MPV 11.2 fL      Platelets 001 Thousands/uL      nRBC 0 /100 WBCs      Neutrophils Relative 55 %      Immat GRANS % 0 %      Lymphocytes Relative 20 %      Monocytes Relative 8 %      Eosinophils Relative 16 %      Basophils Relative 1 %      Neutrophils Absolute 5.09 Thousands/µL      Immature Grans Absolute 0.03 Thousand/uL      Lymphocytes Absolute 1.87 Thousands/µL      Monocytes Absolute 0.71 Thousand/µL      Eosinophils Absolute 1.43 Thousand/µL      Basophils Absolute 0.06 Thousands/µL                  XR elbow 3+ vw LEFT   ED Interpretation by Osman Freire MD (08/26 1850)   Per my independent interpretation: No acute osseous abnormality      CT head wo contrast   Final Result by Jack May MD (08/26 1730)      No acute intracranial abnormality. Chronic microangiopathic changes. Workstation performed: [id number]         CT spine cervical without contrast   Final Result by Jack May MD (08/26 1734)      No cervical spine fracture or traumatic malalignment. Workstation performed: [id number]         CT chest abdomen pelvis wo contrast   Final Result by Jack May MD (08/26 1800)      No evidence of acute traumatic injury throughout the chest, abdomen or pelvis. Workstation performed: [id number]         CT recon only thoracolumbar   Final Result by Jack May MD (08/26 1749)      No fracture or traumatic subluxation. Extensive T12-L5 posterior fusion appears intact.                Workstation performed: [id number]               Procedures  ECG 12 Lead Documentation Only    Date/Time: 8/26/2023 3:19 PM    Performed by: Melo Humphrey DO  Authorized by: Melo Humphrey DO    Indications / Diagnosis: Fall  Previous ECG:     Previous ECG:  Compared to current    Comparison ECG info:  2/23/2023    Similarity:  Changes noted  Interpretation:     Interpretation: non-specific    Rate:     ECG rate:  105    ECG rate assessment: tachycardic    Rhythm:     Rhythm: sinus tachycardia    Ectopy:     Ectopy: none    QRS:     QRS axis:  Left    QRS intervals:  Normal  Conduction:     Conduction: normal    ST segments:     ST segments:  Normal  T waves:     T waves: normal            ED Course  ED Course as of 08/26/23 2327   Sat Aug 26, 2023   1612 CBC and differential(!)  No leukocytosis, H&H within baseline range. 1629 PTT(!): 22   1629 POCT INR: 0.86   1629 LACTIC ACID: 1.6   1643 Comprehensive metabolic panel  No electrolyte abnormalities, no evidence of endorgan dysfunction. 1643 hs TnI 0hr: 12  Patient denies chest pain, however will obtain 2-hour repeat. 1733 CT head wo contrast  IMPRESSION:     No acute intracranial abnormality. Chronic microangiopathic changes   1736 CT spine cervical without contrast  IMPRESSION:     No cervical spine fracture or traumatic malalignment.      1751 CT recon only thoracolumbar  IMPRESSION:     No fracture or traumatic subluxation. Extensive T12-L5 posterior fusion appears intact.      1811 CT chest abdomen pelvis wo contrast  IMPRESSION:     No evidence of acute traumatic injury throughout the chest, abdomen or pelvis   1835 Pt with positive orthostatics. Updated pt and doctor on test results, need for IV fluid bolus, and need to obtain urine specimen. Will obtain repeat orthostatics after fluid bolus. [address] 2hr hsTnI: 3  Wnl.   1853 XR elbow 3+ vw LEFT  Per my independent interpretation: No acute osseous abnormality   2032 Leukocytes, UA(!): Moderate  Pending micro. 2040 Urine Microscopic(!)  Contaminated. Will send culture but will not treat with abx at this time. 2047 [place] 4hr hsTnI: 8  Will admit.    2107 Repeat orthostatics negative, patient feels generally weak, has continued pain. Will give small dose oxycodone plus acetaminophen for pain, admit to medicine. SBIRT 20yo+    Flowsheet Row Most Recent Value   Initial Alcohol Screen: US AUDIT-C     1. How often do you have a drink containing alcohol? 0 Filed at: 08/26/2023 1523   2. How many drinks containing alcohol do you have on a typical day you are drinking? 0 Filed at: 08/26/2023 1523   3a. Male UNDER 65: How often do you have five or more drinks on one occasion? 0 Filed at: 08/26/2023 1523   3b. FEMALE Any Age, or MALE 65+: How often do you have 4 or more drinks on one occassion? 0 Filed at: 08/26/2023 1523   Audit-C Score 0 Filed at: 08/26/2023 1523   DUNCAN: How many times in the past year have you. .. Used an illegal drug or used a prescription medication for non-medical reasons? Never Filed at: 08/26/2023 1523                Medical Decision Making  Patient is an 57-year-old female who presents with neck and back pain after fall. Denies LOC or head strike. Had similar previous fall a week and a half ago, was not evaluated at that time. Denies prodromal symptoms prior to the fall. Is mildly tachycardic, vital signs otherwise within normal limits. Physical exam as above with concern for injury to the neck and back, also potential rib injury based on exam.  Given mechanism, will obtain imaging of head, cervical spine, and thorax. We will also obtain labs to evaluate for potential source of recurrent falls. Also plan to obtain orthostatic vital signs if imaging negative for acute injuries. Pt initially declined pain medications, also adamantly declined cervical spine immobilizer despite risks of not wearing being thoroughly explained. See ED course for remainder of MDM.       Ambulatory dysfunction: acute illness or injury  Back pain: acute illness or injury  Fall, initial encounter: acute illness or injury  Generalized weakness: acute illness or injury  Neck pain: acute illness or injury  Orthostatic hypotension: acute illness or injury  Tachycardia: acute illness or injury  Amount and/or Complexity of Data Reviewed  External Data Reviewed: labs, radiology, ECG and notes. Labs: ordered. Decision-making details documented in ED Course. Radiology: ordered and independent interpretation performed. Decision-making details documented in ED Course. ECG/medicine tests: ordered and independent interpretation performed. Risk  OTC drugs. Prescription drug management. Decision regarding hospitalization. Disposition  Final diagnoses:   Fall, initial encounter   Back pain   Neck pain   Orthostatic hypotension   Tachycardia   Generalized weakness   Ambulatory dysfunction     Time reflects when diagnosis was documented in both MDM as applicable and the Disposition within this note     Time User Action Codes Description Comment    8/26/2023  9:12 PM Ky Michaels [F86. Ezzie Centers Fall, initial encounter     8/26/2023  9:12 PM Joesphine Elly Add [M54.9] Back pain     8/26/2023  9:12 PM Joesphine Elly Add [M54.2] Neck pain     8/26/2023  9:13 PM Joesphine Elly Add [I95.1] Orthostatic hypotension     8/26/2023  9:13 PM Joesphine Elly Add [R00.0] Tachycardia     8/26/2023  9:13 PM Joesphine Elly Add [R53.1] Generalized weakness     8/26/2023  9:13 PM Joesphine Elly Add [R26.2] Ambulatory dysfunction       ED Disposition     ED Disposition   Admit    Condition   Stable    Date/Time   Sat Aug 26, 2023  9:30 PM    Comment   Case was discussed with [facility] resident Elbert Freeman and the patient's admission status was agreed to be Admission Status: observation status to the service of Dr. Sy Sanchez .            Follow-up Information    None         Current Discharge Medication List      CONTINUE these medications which have NOT CHANGED    Details   aspirin 81 mg chewable tablet Chew 1 tablet (81 mg total) daily  Qty: 30 tablet, Refills: 0    Associated Diagnoses: Cerebrovascular accident (CVA), unspecified mechanism ([address])      atorvastatin (LIPITOR) 40 mg tablet TAKE ONE TABLET BY MOUTH ONCE DAILY  Qty: 90 tablet, Refills: 0    Associated Diagnoses: High cholesterol      baclofen 10 mg tablet TAKE ONE TABLET BY MOUTH THREE TIMES DAILY AS NEEDED FOR MUSCLE SPASM  Qty: 90 tablet, Refills: 0    Associated Diagnoses: Lumbar back pain      escitalopram (Lexapro) 20 mg tablet Take 0.5 tablets (10 mg total) by mouth daily  Qty: 30 tablet, Refills: 5    Associated Diagnoses: Reactive depression      levothyroxine 25 mcg tablet Take 1.5 tablets (37.5 mcg total) by mouth daily  Qty: 45 tablet, Refills: 4    Associated Diagnoses: Hypothyroidism, unspecified type      lisinopril (ZESTRIL) 20 mg tablet TAKE ONE TABLET BY MOUTH ONCE DAILY  Qty: 100 tablet, Refills: 0    Associated Diagnoses: Essential hypertension      meclizine (ANTIVERT) 25 mg tablet Take 1 tablet (25 mg total) by mouth 4 (four) times a day as needed for dizziness  Qty: 60 tablet, Refills: 0    Associated Diagnoses: Vertigo      metFORMIN (GLUCOPHAGE) 1000 MG tablet Take 1 tablet (1,000 mg total) by mouth 2 (two) times a day with meals  Qty: 180 tablet, Refills: 3    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, managed as type 2 (HCC)      metoclopramide (REGLAN) 10 mg tablet Take 1 tablet (10 mg total) by mouth 4 (four) times a day as needed (Nausea and vomiting)  Qty: 120 tablet, Refills: 2    Comments: Change in dose  Associated Diagnoses: Gastroparesis      metoprolol succinate (TOPROL-XL) 25 mg 24 hr tablet Take 25 mg by mouth daily      Milnacipran HCl (Savella) 100 MG TABS Take 1 tablet (100 mg total) by mouth daily  Qty: 30 tablet, Refills: 3    Associated Diagnoses: Fibromyalgia      oxyCODONE-acetaminophen (PERCOCET)  mg per tablet Take 1 tablet by mouth every 6 (six) hours as needed for severe pain Max Daily Amount: 4 tablets  Qty: 120 tablet, Refills: 0    Comments: Patient going to use Good RX card  Associated Diagnoses: Post laminectomy syndrome; Lumbar back pain      pantoprazole (PROTONIX) 40 mg tablet Take 1 tablet (40 mg total) by mouth every 12 (twelve) hours  Qty: 180 tablet, Refills: 0    Comments: DX Code Needed  . Associated Diagnoses: Gastroesophageal reflux disease without esophagitis      saccharomyces boulardii (FLORASTOR) 250 mg capsule Take 1 capsule (250 mg total) by mouth 2 (two) times a day  Qty: 90 capsule, Refills: 0    Associated Diagnoses: Diarrhea, unspecified type      trospium (SANCTURA XR) 60 mg 24 hr capsule Take 1 capsule (60 mg total) by mouth daily before breakfast  Qty: 30 capsule, Refills: 3    Associated Diagnoses: Urinary incontinence, unspecified type      albuterol (PROVENTIL HFA,VENTOLIN HFA) 90 mcg/act inhaler Inhale 2 puffs every 6 (six) hours as needed for wheezing or shortness of breath  Qty: 6.7 g, Refills: 5    Comments: Substitution to a formulary equivalent within the same pharmaceutical class is authorized. Associated Diagnoses: Mild intermittent asthma without complication      Blood Glucose Monitoring Suppl (OneTouch Verio Reflect) w/Device KIT Check blood sugars twice daily. Please substitute with appropriate alternative as covered by patient's insurance.  Dx: E11.65  Qty: 1 kit, Refills: 0    Associated Diagnoses: Type 2 diabetes mellitus without complication, without long-term current use of insulin (HCC)      chlorthalidone (HYGROTEN) 15 MG tablet Take 1.5 tablets (22.5 mg total) by mouth daily  Qty: 30 tablet, Refills: 3    Associated Diagnoses: Localized edema      cholestyramine (QUESTRAN) 4 g packet Take 1 packet (4 g total) by mouth 3 (three) times a day with meals  Qty: 60 packet, Refills: 2    Associated Diagnoses: Diarrhea, unspecified type      doxycycline hyclate (VIBRA-TABS) 100 mg tablet Take 100 mg by mouth 2 (two) times a day      gabapentin (NEURONTIN) 300 mg capsule Take 1 capsule (300 mg total) by mouth 3 (three) times a day  Qty: 90 capsule, Refills: 5    Associated Diagnoses: Neuropathy      glucose blood (OneTouch Verio) test strip Check blood sugars twice daily. Please substitute with appropriate alternative as covered by patient's insurance. Dx: E11.65  Qty: 200 each, Refills: 3    Associated Diagnoses: Type 2 diabetes mellitus without complication, without long-term current use of insulin ([facility])      hydrochlorothiazide (HYDRODIURIL) 12.5 mg tablet Take 2 tablets (25 mg total) by mouth daily as needed (edema/swelling)  Qty: 30 tablet, Refills: 3    Associated Diagnoses: Localized edema      hyoscyamine (ANASPAZ,LEVSIN) 0.125 MG tablet Take 1 tablet (0.125 mg total) by mouth every 4 (four) hours as needed for cramping  Qty: 120 tablet, Refills: 0    Associated Diagnoses: Spasm of colon      latanoprost (XALATAN) 0.005 % ophthalmic solution Administer 1 drop to both eyes daily at bedtime  Qty: 2.5 mL, Refills: 3    Associated Diagnoses: Glaucoma of both eyes, unspecified glaucoma type      metoprolol tartrate (LOPRESSOR) 25 mg tablet Take 0.5 tablets (12.5 mg total) by mouth every 12 (twelve) hours  Qty: 45 tablet, Refills: 3    Associated Diagnoses: Primary hypertension      nystatin (MYCOSTATIN) powder Apply topically 2 (two) times a day  Qty: 15 g, Refills: 0    Associated Diagnoses: Sepsis ([address]); Obesity, morbid (HCC)      OneTouch Delica Lancets 82O MISC Check blood sugars twice daily. Please substitute with appropriate alternative as covered by patient's insurance.  Dx: E11.65  Qty: 200 each, Refills: 3    Associated Diagnoses: Type 2 diabetes mellitus without complication, without long-term current use of insulin ([facility])      prednisoLONE (ORAPRED) 15 mg/5 mL oral solution 3 tsp twice/day for 2 days, 2 tsp twice/day for 2 days, 1 tsp twice/day x2 days than 1/2 tsp daily x 2 days  Qty: 115 mL, Refills: 0    Associated Diagnoses: Bilateral sciatica      zolpidem (AMBIEN) 5 mg tablet Take 1 tablet (5 mg total) by mouth daily at bedtime as needed for sleep  Qty: 30 tablet, Refills: 0    Associated Diagnoses: Primary insomnia         STOP taking these medications       predniSONE 50 mg tablet Comments:   Reason for Stopping:             No discharge procedures on file. PDMP Review       Value Time User    PDMP Reviewed  Yes 8/26/2023  9:34 PM Tejas Duran MD           ED Provider  Attending physically available and evaluated Ronnie Castro. I managed the patient along with the ED Attending.     Electronically Signed by         Luis Ivy DO  08/26/23 5324

## 2023-08-27 NOTE — ASSESSMENT & PLAN NOTE
· POA  -247/  · Orthostasis positive with labile BP at the ED receive 500 cc bolus LR  · Upon my evaluation patient does not seem to be hypovolemic  Blood Pressure: 158/79  · Acceptable at this time    · Plan  · Continue home Toprol-XL 25 mg once a day  · Continue lisinopril 20 mg once a days  · Medication reconciliation with paperwork provided by patient does not take hydrochlorothiazide or chlorthalidone as written in epic.   · Labetalol 10 mg as needed every 6 hours for sustained SBP >180/[id number]

## 2023-08-27 NOTE — UTILIZATION REVIEW
NOTIFICATION OF OBSERVATION ADMISSION   AUTHORIZATION REQUEST   SERVICING FACILITY:   [address] [place] [address]  Tax ID: [id number]  NPI: [id number]   ATTENDING PROVIDER:  Attending Name and NPI#: Jesus Mann Md [[id number]]  Address: [address] [place] [address]  Phone: [phone]   ADMISSION INFORMATION:  [facility] Code: 22 CPT Code:   Admitting Diagnosis Code/Description:  Orthostatic hypotension [I95.1]  Neck pain [M54.2]  Back pain [M54.9]  Injury [T14.90XA]  Tachycardia [R00.0]  Generalized weakness [R53.1]  Ambulatory dysfunction [R26.2]  Observation Admission Date/Time: [email or web address]  Discharge Date/Time: No discharge date for patient encounter. UTILIZATION REVIEW CONTACT:  Demian Watkins, Utilization   Network Utilization Review Department  Phone: [phone]  Fax: [phone]  Email: [email or web address]  Contact for approvals/pending authorizations, clinical reviews, and discharge. PHYSICIAN ADVISORY SERVICES:  Medical Necessity Denial & Efns-ii-Ycyb Review  Phone: [phone]  Fax: [phone]  Email: [email or web address]

## 2023-08-27 NOTE — PROGRESS NOTES
[address] [facility]  Progress Note  Name: Maryse Cornell  MRN: [id number]  Unit/Bed#: S -01 I Date of Admission: 8/26/2023   Date of Service: 8/27/2023 I Hospital Day: 0    Assessment/Plan   * Ambulatory dysfunction  Assessment & Plan  · POA status post fall. Ambulates with assisted rollator walker at baseline fell back hitting her Head, neck and back with coffee table. Endorse had a fall last week similar where she fell back from rollator walker. · Prior to fall denied any lightheadedness or dizziness as well as no palpitations however during orthostasis at the ED felt dizzy upon standing. · At the ED blood pressure was noted significantly elevated despite home medications taken today however subsequently labile blood pressures with associated tachycardia. · ECG noted sinus tachycardia heart rate 105 with LBBB however this is not new compared to last  February 2023  · Orthostats positive on 27AUG23  · Pt lives in 1st floor apartment with son (according to daughter, the son also has health concerns and cannot take care of pt constantly)    · Plan  · Continue telemetry however low suspicious for cardiac origin mostly mechanical falls  · Recheck orthostasis although does not seem to be hypovolemic at this time. · Continue home lisinopril 20 mg once a day  · Resume home Toprol-XL 25 mg once a day consider increase dose if remains tachycardic  · Vital signs per protocol  · PT OT  · Fall precautions  · Discharge to short term rehab pending PT/OT eval  · Compression stockings if pt continues to have positive orthotasts  · Provide pt education on safe living in the setting of orthostatic hypotension. · FU with PCP and potentioally cardiology outpatient. · Pt family was contacted and are fearfull of another fall, suggested a skill nursing facilty/nursing home would be appropriate.      Abnormal urinalysis  Assessment & Plan  · POA UA moderate leukocyte with associated epithelial cell contaminate sample  · Patient denies any symptoms  · We will monitor off antibiotic    Anemia  Assessment & Plan  · Hx Iron deficiency anemia  · POA Hb 11.2 baseline seems to be 10-11  · No over bleeding  · CBC am    Overactive bladder  Assessment & Plan  · Continue oxybutynin 10 mg once a day per hospital formulary for [place] 60 mg    Hypothyroidism  Assessment & Plan  · Continue home levothyroxine 37.5 milligram once a day    Hypertensive urgency  Assessment & Plan  · POA  -247/  · Orthostasis positive with labile BP at the ED receive 500 cc bolus LR  · Upon my evaluation patient does not seem to be hypovolemic  Blood Pressure: 158/79  · Acceptable at this time    · Plan  · Continue home Toprol-XL 25 mg once a day  · Continue lisinopril 20 mg once a days  · Medication reconciliation with paperwork provided by patient does not take hydrochlorothiazide or chlorthalidone as written in epic. · Labetalol 10 mg as needed every 6 hours for sustained SBP >180/[id number]    Chronic pain disorder  Assessment & Plan  · History thoracolumbar spinal stenosis  · Negative pan CT  · Continue home Percocet  milligram every 4 hours as needed  · Continue Tylenol schedule 925 mg every 8 hours  · Continue aqua K after fall  · Continue Lidoderm patch for neck pain. Type 2 diabetes mellitus with hyperglycemia, without long-term current use of insulin [facility])  Assessment & Plan  Lab Results   Component Value Date    HGBA1C 6.6 (H) 06/09/2023       Recent Labs     08/26/23  2237 08/27/23  0601   POCGLU 145* 129       Blood Sugar Average: Last 72 hrs:  · (P) 137Holding home metformin   · Continue sliding scale  · Continue carbohydrate controlled diet. VTE Pharmacologic Prophylaxis: VTE Score: 5 High Risk (Score >/= 5) - Pharmacological DVT Prophylaxis Ordered: enoxaparin (Lovenox). Sequential Compression Devices Ordered.     Patient Centered Rounds: I performed bedside rounds with nursing staff today.  Discussions with Specialists or Other Care Team Provider: N/A    Education and Discussions with Family / Patient: Updated  (daughter) via phone. Current Length of Stay: 0 day(s)  Current Patient Status: Observation   Discharge Plan: Anticipate discharge tomorrow to rehab facility. Code Status: Level 1 - Full Code    Subjective:   Pt seen in the morning and was requesting pain medications. Pt noted that the fall was backwards with no head strike no loss of consciousness. Pt denies any prodrome or symptoms other than instability. Objective:     Vitals:   Temp (24hrs), Av.2 °F (36.8 °C), Min:98 °F (36.7 °C), Max:98.3 °F (36.8 °C)    Temp:  [98 °F (36.7 °C)-98.3 °F (36.8 °C)] 98 °F (36.7 °C)  HR:  [] 93  Resp:  [18] 18  BP: (127-247)/() 144/84  SpO2:  [93 %-98 %] 93 %  Body mass index is 29.8 kg/m². Input and Output Summary (last 24 hours): Intake/Output Summary (Last 24 hours) at 2023 1152  Last data filed at 2023  Gross per 24 hour   Intake 500 ml   Output --   Net 500 ml       Physical Exam:   Physical Exam  Vitals and nursing note reviewed. Constitutional:       General: She is not in acute distress. Appearance: She is well-developed. She is not ill-appearing. HENT:      Head: Normocephalic and atraumatic. Right Ear: External ear normal.      Left Ear: External ear normal.      Nose: Nose normal.      Mouth/Throat:      Mouth: Mucous membranes are moist.   Eyes:      General: No scleral icterus. Extraocular Movements: Extraocular movements intact. Conjunctiva/sclera: Conjunctivae normal.      Pupils: Pupils are equal, round, and reactive to light. Cardiovascular:      Rate and Rhythm: Normal rate and regular rhythm. Pulses: Normal pulses. Heart sounds: Normal heart sounds. No murmur heard. Pulmonary:      Effort: Pulmonary effort is normal. No respiratory distress. Breath sounds: Normal breath sounds. Abdominal:      General: Bowel sounds are normal. There is no distension. Palpations: Abdomen is soft. Tenderness: There is no abdominal tenderness. Musculoskeletal:         General: No swelling. Cervical back: Normal range of motion and neck supple. Right lower leg: Edema present. Left lower leg: Edema present. Comments: Bilateral lower extremity edema up to ankles +1   Skin:     General: Skin is warm and dry. Capillary Refill: Capillary refill takes less than 2 seconds. Neurological:      General: No focal deficit present. Mental Status: She is alert. Comments: Tender to palpation neck paraspinal muscles low back.    Psychiatric:         Mood and Affect: Mood normal.          Additional Data:     Labs:  Results from last 7 days   Lab Units 08/27/23  0704 08/26/23  1556   WBC Thousand/uL 7.91 9.19   HEMOGLOBIN g/dL 10.3* 11.2*   HEMATOCRIT % 32.9* 36.4   PLATELETS Thousands/uL 245 262   NEUTROS PCT %  --  55   LYMPHS PCT %  --  20   MONOS PCT %  --  8   EOS PCT %  --  16*     Results from last 7 days   Lab Units 08/27/23  0704 08/26/23  1556   SODIUM mmol/L 137 140   POTASSIUM mmol/L 4.3 3.9   CHLORIDE mmol/L 105 105   CO2 mmol/L 25 27   BUN mg/dL 15 17   CREATININE mg/dL 0.87 0.92   ANION GAP mmol/L 7 8   CALCIUM mg/dL 8.8 9.3   ALBUMIN g/dL  --  3.6   TOTAL BILIRUBIN mg/dL  --  0.24   ALK PHOS U/L  --  102   ALT U/L  --  8   AST U/L  --  15   GLUCOSE RANDOM mg/dL 144* 138     Results from last 7 days   Lab Units 08/26/23  1556   INR  0.86     Results from last 7 days   Lab Units 08/27/23  1114 08/27/23  0601 08/26/23  2237   POC GLUCOSE mg/dl 270* 129 145*         Results from last 7 days   Lab Units 08/26/23  1556   LACTIC ACID mmol/L 1.6       Lines/Drains:  Invasive Devices     Peripheral Intravenous Line  Duration           Peripheral IV 08/26/23 Left Antecubital <1 day                  Telemetry:  Telemetry Orders (From admission, onward)             24 Hour Telemetry Monitoring  Continuous x 24 Hours (Telem)        Question:  Reason for 24 Hour Telemetry  Answer:  Syncope suspected to be cardiac in origin                 Telemetry Reviewed: Normal Sinus Rhythm  Indication for Continued Telemetry Use: No indication for continued use. Will discontinue. Imaging: Reviewed radiology reports from this admission including: CT head and CT spine    Recent Cultures (last 7 days):   Results from last 7 days   Lab Units 08/26/23  1620 08/26/23  1600   BLOOD CULTURE  Received in Microbiology Lab. Culture in Progress. Received in Microbiology Lab. Culture in Progress.        Last 24 Hours Medication List:   Current Facility-Administered Medications   Medication Dose Route Frequency Provider Last Rate   • acetaminophen  975 mg Oral [facility] Celestino Umanzor MD     • albuterol  2 puff Inhalation Q6H PRN Celestino Umanzor MD     • aspirin  81 mg Oral Daily Celestino Umanzor MD     • atorvastatin  40 mg Oral Daily Celestino Umanzor MD     • baclofen  10 mg Oral TID PRN Celestino Umanzor MD     • enoxaparin  40 mg Subcutaneous Daily Celestino Umanzor MD     • insulin lispro  1-5 Units Subcutaneous TID [address] MD     • labetalol  10 mg Intravenous Q6H PRN Celestino Umanzor MD     • levothyroxine  37.5 mcg Oral Early Morning Celestino Umanzor MD     • lidocaine  1 patch Topical Daily Celestino Umanzor MD     • lisinopril  20 mg Oral Daily Celestino Umanzor MD     • melatonin  6 mg Oral HS Celestino Umanzor MD     • metoclopramide  10 mg Oral 4x Daily PRN Celestino Umanzor MD     • metoprolol succinate  25 mg Oral Daily Celestino Umanzor MD     • oxybutynin  10 mg Oral Daily Celestino Umanzor MD     • oxyCODONE  10 mg Oral Q4H PRN Martha Clark MD     • oxyCODONE  5 mg Oral Q4H PRN Martha Clark MD     • pantoprazole  40 mg Oral Early Morning Emelia Campbell MD     • saccharomyces boulardii  250 mg Oral BID Emelia Campbell MD          Today, Patient Was Seen By: Samaria Ramírez MD    **Please Note: This note may have been constructed using a voice recognition system. **

## 2023-08-27 NOTE — ASSESSMENT & PLAN NOTE
· POA status post fall. Ambulates with assisted rollator walker at baseline fell back hitting her Head, neck and back with coffee table. Endorse had a fall last week similar where she fell back from rollator walker. · Prior to fall denied any lightheadedness or dizziness as well as no palpitations however during orthostasis at the ED felt dizzy upon standing. · At the ED blood pressure was noted significantly elevated despite home medications taken today however subsequently labile blood pressures with associated tachycardia. · ECG noted sinus tachycardia heart rate 105 with LBBB however this is not new compared to last  February 2023  · Orthostats positive on [id number]  · Pt lives in 1st floor apartment with son (according to daughter, the son also has health concerns and cannot take care of pt constantly)    · Plan  · Continue telemetry however low suspicious for cardiac origin mostly mechanical falls  · Recheck orthostasis although does not seem to be hypovolemic at this time. · Continue home lisinopril 20 mg once a day  · Resume home Toprol-XL 25 mg once a day consider increase dose if remains tachycardic  · Vital signs per protocol  · PT OT  · Fall precautions  · Discharge to short term rehab pending PT/OT eval  · Compression stockings if pt continues to have positive orthotasts  · Provide pt education on safe living in the setting of orthostatic hypotension. · FU with PCP and potentioally cardiology outpatient. · Pt family was contacted and are fearfull of another fall, suggested a skill nursing facilty/nursing home would be appropriate.

## 2023-08-27 NOTE — ASSESSMENT & PLAN NOTE
· POA status post fall. Ambulates with assisted rollator walker at baseline fell back hitting her Head, neck and back with coffee table. Endorse had a fall last week similar where she fell back from rollator walker. · Prior to fall denied any lightheadedness or dizziness as well as no palpitations however during orthostasis at the ED felt dizzy upon standing. · At the ED blood pressure was noted significantly elevated despite home medications taken today however subsequently labile blood pressures with associated tachycardia. · ECG noted sinus tachycardia heart rate 105 with LBBB however this is not new compared to last  February 2023    · Plan  · Continue telemetry however low suspicious for cardiac origin mostly mechanical falls  · Recheck orthostasis although does not seem to be hypovolemic at this time.   · Continue home lisinopril 20 mg once a day  · Resume home Toprol-XL 25 mg once a day  · Vital signs per protocol  · PT OT  · Fall precautions

## 2023-08-27 NOTE — ASSESSMENT & PLAN NOTE
· POA UA moderate leukocyte with associated epithelial cell contaminate sample  · Patient denies any symptoms  · We will monitor off antibiotic

## 2023-08-27 NOTE — ASSESSMENT & PLAN NOTE
· History thoracolumbar spinal stenosis  · Negative pan CT  · Continue home Percocet  milligram every 4 hours as needed  · Continue Tylenol schedule 925 mg every 8 hours  · Continue aqua K after fall  · Continue Lidoderm patch for neck pain.

## 2023-08-28 VITALS
TEMPERATURE: 98.3 F | HEIGHT: 60 IN | HEART RATE: 86 BPM | OXYGEN SATURATION: 97 % | SYSTOLIC BLOOD PRESSURE: 145 MMHG | WEIGHT: 153 LBS | DIASTOLIC BLOOD PRESSURE: 92 MMHG | RESPIRATION RATE: 18 BRPM | BODY MASS INDEX: 30.04 KG/M2

## 2023-08-28 LAB
ANION GAP SERPL CALCULATED.3IONS-SCNC: 3 MMOL/L
BACTERIA UR CULT: NORMAL
BASOPHILS # BLD AUTO: 0.03 THOUSANDS/ÂΜL (ref 0–0.1)
BASOPHILS NFR BLD AUTO: 0 % (ref 0–1)
BUN SERPL-MCNC: 15 MG/DL (ref 5–25)
CALCIUM SERPL-MCNC: 9.2 MG/DL (ref 8.4–10.2)
CHLORIDE SERPL-SCNC: 107 MMOL/L (ref 96–108)
CO2 SERPL-SCNC: 31 MMOL/L (ref 21–32)
CREAT SERPL-MCNC: 0.92 MG/DL (ref 0.6–1.3)
EOSINOPHIL # BLD AUTO: 1.25 THOUSAND/ÂΜL (ref 0–0.61)
EOSINOPHIL NFR BLD AUTO: 17 % (ref 0–6)
ERYTHROCYTE [DISTWIDTH] IN BLOOD BY AUTOMATED COUNT: 13.8 % (ref 11.6–15.1)
GFR SERPL CREATININE-BSD FRML MDRD: 58 ML/MIN/1.73SQ M
GLUCOSE SERPL-MCNC: 156 MG/DL (ref 65–140)
GLUCOSE SERPL-MCNC: 160 MG/DL (ref 65–140)
GLUCOSE SERPL-MCNC: 229 MG/DL (ref 65–140)
GLUCOSE SERPL-MCNC: 259 MG/DL (ref 65–140)
HCT VFR BLD AUTO: 35.9 % (ref 34.8–46.1)
HGB BLD-MCNC: 11.1 G/DL (ref 11.5–15.4)
IMM GRANULOCYTES # BLD AUTO: 0.02 THOUSAND/UL (ref 0–0.2)
IMM GRANULOCYTES NFR BLD AUTO: 0 % (ref 0–2)
LYMPHOCYTES # BLD AUTO: 1.52 THOUSANDS/ÂΜL (ref 0.6–4.47)
LYMPHOCYTES NFR BLD AUTO: 21 % (ref 14–44)
MCH RBC QN AUTO: 29.1 PG (ref 26.8–34.3)
MCHC RBC AUTO-ENTMCNC: 30.9 G/DL (ref 31.4–37.4)
MCV RBC AUTO: 94 FL (ref 82–98)
MONOCYTES # BLD AUTO: 0.69 THOUSAND/ÂΜL (ref 0.17–1.22)
MONOCYTES NFR BLD AUTO: 10 % (ref 4–12)
NEUTROPHILS # BLD AUTO: 3.67 THOUSANDS/ÂΜL (ref 1.85–7.62)
NEUTS SEG NFR BLD AUTO: 52 % (ref 43–75)
NRBC BLD AUTO-RTO: 0 /100 WBCS
PLATELET # BLD AUTO: 262 THOUSANDS/UL (ref 149–390)
PMV BLD AUTO: 10.5 FL (ref 8.9–12.7)
POTASSIUM SERPL-SCNC: 4.2 MMOL/L (ref 3.5–5.3)
RBC # BLD AUTO: 3.81 MILLION/UL (ref 3.81–5.12)
SODIUM SERPL-SCNC: 141 MMOL/L (ref 135–147)
WBC # BLD AUTO: 7.18 THOUSAND/UL (ref 4.31–10.16)

## 2023-08-28 PROCEDURE — 82948 REAGENT STRIP/BLOOD GLUCOSE: CPT

## 2023-08-28 PROCEDURE — 97116 GAIT TRAINING THERAPY: CPT

## 2023-08-28 PROCEDURE — 80048 BASIC METABOLIC PNL TOTAL CA: CPT

## 2023-08-28 PROCEDURE — 85025 COMPLETE CBC W/AUTO DIFF WBC: CPT

## 2023-08-28 PROCEDURE — 99239 HOSP IP/OBS DSCHRG MGMT >30: CPT | Performed by: HOSPITALIST

## 2023-08-28 PROCEDURE — 97163 PT EVAL HIGH COMPLEX 45 MIN: CPT

## 2023-08-28 RX ORDER — IODINE/SODIUM IODIDE 2 %
TINCTURE TOPICAL DAILY
Status: DISCONTINUED | OUTPATIENT
Start: 2023-08-28 | End: 2023-08-28 | Stop reason: HOSPADM

## 2023-08-28 RX ORDER — MECLIZINE HYDROCHLORIDE 25 MG/1
25 TABLET ORAL EVERY 8 HOURS PRN
Status: DISCONTINUED | OUTPATIENT
Start: 2023-08-28 | End: 2023-08-28 | Stop reason: HOSPADM

## 2023-08-28 RX ADMIN — MECLIZINE HYDROCHLORIDE 25 MG: 25 TABLET ORAL at 12:14

## 2023-08-28 RX ADMIN — INSULIN LISPRO 1 UNITS: 100 INJECTION, SOLUTION INTRAVENOUS; SUBCUTANEOUS at 08:27

## 2023-08-28 RX ADMIN — ENOXAPARIN SODIUM 40 MG: 40 INJECTION SUBCUTANEOUS at 08:21

## 2023-08-28 RX ADMIN — LEVOTHYROXINE SODIUM 37.5 MCG: 75 TABLET ORAL at 05:15

## 2023-08-28 RX ADMIN — LISINOPRIL 20 MG: 20 TABLET ORAL at 08:21

## 2023-08-28 RX ADMIN — Medication 250 MG: at 08:21

## 2023-08-28 RX ADMIN — METOCLOPRAMIDE 10 MG: 10 TABLET ORAL at 07:02

## 2023-08-28 RX ADMIN — PANTOPRAZOLE SODIUM 40 MG: 40 TABLET, DELAYED RELEASE ORAL at 05:15

## 2023-08-28 RX ADMIN — METOCLOPRAMIDE 10 MG: 10 TABLET ORAL at 12:03

## 2023-08-28 RX ADMIN — ACETAMINOPHEN 975 MG: 325 TABLET, FILM COATED ORAL at 14:06

## 2023-08-28 RX ADMIN — OXYCODONE HYDROCHLORIDE 5 MG: 5 TABLET ORAL at 08:34

## 2023-08-28 RX ADMIN — OXYBUTYNIN CHLORIDE 10 MG: 5 TABLET, EXTENDED RELEASE ORAL at 08:21

## 2023-08-28 RX ADMIN — ASPIRIN 81 MG 81 MG: 81 TABLET ORAL at 08:21

## 2023-08-28 RX ADMIN — CALAMINE 8% AND ZINC OXIDE 8%: 160 LOTION TOPICAL at 08:34

## 2023-08-28 RX ADMIN — INSULIN LISPRO 2 UNITS: 100 INJECTION, SOLUTION INTRAVENOUS; SUBCUTANEOUS at 12:43

## 2023-08-28 RX ADMIN — ATORVASTATIN CALCIUM 40 MG: 40 TABLET, FILM COATED ORAL at 08:21

## 2023-08-28 RX ADMIN — ACETAMINOPHEN 975 MG: 325 TABLET, FILM COATED ORAL at 05:14

## 2023-08-28 RX ADMIN — METOPROLOL SUCCINATE 25 MG: 25 TABLET, EXTENDED RELEASE ORAL at 08:21

## 2023-08-28 NOTE — DISCHARGE INSTR - AVS FIRST PAGE
Dear Christopher Willis,     It was our pleasure to care for you here at [facility]. It is our hope that we were always able to exceed the expected standards for your care during your stay. You were hospitalized due to fall. You were cared for on the second floor by Stephan Noriega MD under the service of Noe Benedict MD with the [facility] who covers for your primary care physician (PCP), Vanesa Castro, while you were hospitalized. If you have any questions or concerns related to this hospitalization, you may contact us at [phone]. For follow up as well as any medication refills, we recommend that you follow up with your primary care physician. A registered nurse will reach out to you by phone within a few days after your discharge to answer any additional questions that you may have after going home. However, at this time we provide for you here, the most important instructions / recommendations at discharge:     Notable Medication Adjustments -   None  Testing Required after Discharge -   Audiometry Testing for Meniere's Disease   Important follow up information -   Please follow up with your primary care physician after discharge  Follow up with a neurologist outpatient. Can be set up through your [facility]. Other Instructions -   Should your symptoms return, or you develop new concerning symptoms, please call your doctor and/or go to your nearest Emergency Department. Please review this entire after visit summary as additional general instructions including medication list, appointments, activity, diet, any pertinent wound care, and other additional recommendations from your care team that may be provided for you.       Sincerely,     Stephan Noriega MD

## 2023-08-28 NOTE — PHYSICAL THERAPY NOTE
PHYSICAL THERAPY EVALUATION  NAME:  Farhana Varela  DATE: 08/28/23    AGE:   78 y.o. Mrn:   [id number]  Principal problem: Principal Problem:    Ambulatory dysfunction  Active Problems:    Type 2 diabetes mellitus with hyperglycemia, without long-term current use of insulin (HCC)    Chronic pain disorder    Hypertensive urgency    Hypothyroidism    Overactive bladder    Anemia    Abnormal urinalysis      Vitals:    08/28/23 1128 08/28/23 1342  Supine 08/28/23 1343  Sitting 08/28/23 1344  Standing after 3 min   BP: 144/79 (!) 173/91 166/93 151/80   BP Location:  Right arm Right arm Right arm   Pulse: 77      Resp:       Temp:       TempSrc:       SpO2: 96%      Weight:       Height:           Length Of Stay: 1  Performed at least 2 patient identifiers during session: Name and Birthday  PHYSICAL THERAPY EVALUATION :    08/28/23 1115   PT Last Visit   PT Visit Date 08/28/23   Note Type   Note type Evaluation   Pain Assessment   Pain Assessment Tool FLACC   Pain Score   (did not quantify)   Pain Location/Orientation Location: Head  (occipital to frontal headache)   Pain Radiating Towards occipital to frontal headache   Pain Onset/Description Frequency: Constant/Continuous   Effect of Pain on Daily Activities limits speed and indep of mobility, increased when pt repositioned toward head of bed . Patient's Stated Pain Goal No pain   Multiple Pain Sites   (chronic pain @ lumbar spine and legs.)   Pain Rating: FLACC (Rest) - Face 1   Pain Rating: FLACC (Rest) - Legs 1   Pain Rating: FLACC (Rest) - Activity 0   Pain Rating: FLACC (Rest) - Cry 1   Pain Rating: FLACC (Rest) - Consolability 1   Score: FLACC (Rest) 4   Pain Rating: FLACC (Activity) - Face 1   Pain Rating: FLACC (Activity) - Legs 1   Pain Rating: FLACC (Activity) - Activity 1   Pain Rating: FLACC (Activity) - Cry 1   Pain Rating: FLACC (Activity) - Consolability 1   Score: FLACC (Activity) 5   Restrictions/Precautions   Other Precautions Bed Alarm; Chair Alarm; Fall Risk;Pain   Home Living   Type of [facility]  One level;Stairs to enter with rails  (4 ELLYN, does not perfon on her own.)   [place]  (rolling vs rollator)   Prior Function   Level of [place] Independent with functional mobility; Needs assistance with IADLS   Lives With Son   Receives Help From Other (Comment)  (senior life services)   Falls in the last 6 months 1 to 4  (4 total; fall backward to R; one standing from bed w/o walker (dizziness); one without walker feeling like I was on a boat)   General   Family/Caregiver Present No   Cognition   Arousal/Participation Alert   Orientation Level Oriented to person;Oriented to place   Memory Decreased short term memory   Following Commands Follows one step commands with increased time or repetition  (w/ MMT)   Subjective   Subjective "I'm still a little weak from sitting all of the time"  Pt cooperative, reports having more LIGHTHEADEDNESS than spinning with change of position AND and with head turning. Pt reports having hx of vertigo and has PRN meclazine.  Attributes some of feeling "off" from "oxy"   RUE Assessment   RUE Assessment WFL   LUE Assessment   LUE Assessment WFL   Strength RLE   R Hip Flexion 4/5   R Knee Extension 4/5   R Ankle Dorsiflexion 4/5   Strength LLE   L Hip Flexion 4/5   L Knee Extension 4/5   L Ankle Dorsiflexion 4/5   Vision-Basic Assessment   Current Vision Wears glasses only for reading   Vestibular   Spontaneous Nystagmus (-) no evidence of nystagmus at rest in room light   Gaze Holding Nystagmus (+) nystagmus room light   Gaze Holding Nystagmus / Lateral Gaze (+) Horizontal Nystagmus (bi-directional) room light  (stronger L beat vs R beat)   Vestibular Comments Reports "lightheadedness" (but not spinning) only with movement, especially change of position and head turning   Coordination   Movements are Fluid and Coordinated 0   Coordination and Movement Description bradykinetic   Light Touch   RLE Light Touch (S)  Grossly intact  (however redness/purple noted at distal aspects of all 5 toes)   LLE Light Touch Grossly intact  (however redness/purple noted at distal aspects of all 5 toes)   Timed Up and Go   TUG Trial 1 (Seconds) 27 Seconds  (w/walker)   Bed Mobility   Supine to Sit Unable to assess   Sit to Supine 5  Supervision   Additional items Increased time required;Verbal cues   Additional Comments However pt needed A for repositioning toward head of bed once in supine   Transfers   Sit to Stand 5  Supervision   Additional items Increased time required;Armrests   Stand to Sit 5  Supervision   Additional items Assist x 1; Increased time required;Armrests   Ambulation/Elevation   Gait pattern Excessively slow; Step through pattern  (slight increased walker advancement)   Gait Assistance 5  Supervision   Additional items Assist x 1;Verbal cues   Assistive Device Rolling walker   Distance 20'   Balance   Static Sitting Good   Static Standing Fair   Ambulatory Fair -   Endurance Deficit   Endurance Deficit Yes   Endurance Deficit Description limited amb distance and standing tolerance   Activity Tolerance   Activity Tolerance Patient limited by pain; Patient limited by fatigue   Medical Staff Made Aware TT coordination w/ Dr Manuel Aguayo, resident, case management, OT. Nurse Made Aware spoke to RN before and after session, including symptoms of lightheadedness     Assessment:   Pt is a 78 y.o. female seen for PT evaluation s/p admit to [facility]/26/2023 w/ Ambulatory dysfunction. Order placed for PT. Prior to admission: Pt lived w/family in one story home w/4 ELLYN-used rolling vs rollator walker. Upon evaluation: Pt needed no physical A for transfers nor gait using walker in room, but displayed gait deviations as well as c/o lightheadedness and +nystagmus .       Pt's clinical presentation is currently unstable/unpredictable given the functional mobility deficits above, especially (but not limited to) gait deviations, pain and decreased functional mobility tolerance, and combined with medical complications of hypertension , tachycardia, fear/retreat and +orthostatics yesterday (and today via nursing vitals post session). She is at risk for falls based on hx of falls, impaired balance, altered vision and abnormal TUG score. During this admission, pt would benefit from continued skilled inpatient PT in the acute care setting in order to address deficits as defined above to maximize function and mobility. Recommendations:   •  From a PT standpoint, recommend next several sessions focus on continued mobility training w/walker, transfer training, stair training. Prognosis Fair   Problem List Pain; Impaired vision;Decreased coordination;Decreased mobility; Impaired balance;Decreased endurance  (gait deviations, lightheadedness)   Goals   Patient Goals to go home, feel less lightheaded but more strong   STG Expiration Date 09/04/23  (if not DCed)   Short Term Goal #1 Pt will: Perform rolling  and supine<>sit bed mobility tasks with modified I to prepare for transfers and reposition in bed. Perform transfers with modified I to promote proper hand placement and approach. Perform ambulation with LRAD for up to 150' with Supervision to improve gait quality and promote proper use of assistive device. Perform 4 stairs w/ railing +/- DME and w/no more than min A to return to home with ELLYN. Improve TUG score to at least 24 sec w/rolling walker to demonstrate less risk for falls. PT Treatment Day 1   Plan   Treatment/Interventions Functional transfer training;LE strengthening/ROM; Elevations; Therapeutic exercise; Endurance training;Cognitive reorientation;Patient/family training;Equipment eval/education;Gait training;Bed mobility;Spoke to nursing;Spoke to case management;Spoke to MD;OT   PT Frequency 3-5x/wk   Recommendation   PT Discharge Recommendation Home with home health rehabilitation   Equipment Recommended Ezella Ray Package Recommended   (has a rollator and rolling walker)   Additional Comments Nursing Recommendations:   Mobility Plan as of 08/28/23: Pt is S Assist with RW to/from recliner, BSC and bathroom. Additional Comments 2 Co-morbidities affecting pt's physical performance at time of assessment include but are not limited to: Anxiety, Chronic narcotic dependence, DM, Hypertension, Post laminectomy syndrome, S/P insertion of spinal cord stimulator (07/18/2018), Spinal stenosis, Status post lumbar spinal fusion (03/16/2018), Stroke, Laminectomy; Lumbar laminectomy; Back surgery; pr arthrodesis posterior/pstlat tq 1ntrspc thoracic. Personal factors affecting pt at time of IE include: steps to enter environment, limited home support, advanced age, behavioral pattern and recent fall(s). AM-PAC Basic Mobility Inpatient   Turning in Flat Bed Without Bedrails 3   Lying on Back to Sitting on Edge of Flat Bed Without Bedrails 3   Moving Bed to Chair 3   Standing Up From Chair Using Arms 3   Walk in Room 3   Climb 3-5 Stairs With Railing 2   Basic Mobility Inpatient Raw Score 17   Basic Mobility Standardized Score 39.67   Highest Level Of Mobility   JH-HLM Goal 5: Stand one or more mins   JH-HLM Achieved 7: Walk 25 feet or more  (during treatment portion of session)   Additional Treatment Session   Start Time 1135   End Time 1145   Treatment Assessment Pt was able to amb further distances w/rolling walker, and displays inconsistent carryover of walker management and transfer technique. Pt's gait speed is at household distance amb pace. Skilled PT recommended to progress pt toward treatment goals. Equipment Use rolling vs rollator walker   Additional Treatment Day 1   Exercises   Neuro re-ed Transfers S and verbal instruction for proper hand placement.  Amb w/rolling walker for 90' total w/ S but verbal instruction for staying inside of walker and walker mgmt (however walker may be too high for pt and she may need short walker). Gait speed .33m/s on average during session. Pt also able to perform target stepping w/ BUE support of walker w/ only steadying Assist (one x each leg)   End of Consult   Patient Position at End of Consult All needs within reach;Bed/Chair alarm activated;Supine  (Pt placed in supine to allow nursing to perform orthostatic vitals from supine.)   (Please find full objective findings from PT assessment regarding body systems outlined above). The patient's -[facility] Basic Mobility Inpatient Short Form Raw Score is 17. A Raw score of greater than 16 suggests the patient may benefit from discharge to home, which DOES coincide with CURRENT above PT recommendations pending home support, continued PT and medical optimization (orthostatic hypotension). However please refer to therapist recommendation for discharge planning given other factors that may influence destination. Adapted from [employer] [facility] “6-Clicks” Basic Mobility and Daily Activity Scores With Discharge Destination. Physical Therapy, 2021;101:1-9. DOI: [email or web address]/[email or web address]    Portions of the record may have been created with voice recognition software. Occasional wrong word or "sound a like" substitutions may have occurred due to the inherent limitations of voice recognition software.   Read the chart carefully and recognize, using context, where substitutions have occurred

## 2023-08-28 NOTE — DISCHARGE SUMMARY
[facility]  Discharge-  Every 1945, 78 y.o. female MRN: [id number]  Unit/Bed#: S -01 Encounter: [id number]  Primary Care Provider: NANY Pederson   Date and time admitted to hospital: 8/26/2023  3:12 PM    * Ambulatory dysfunction  Assessment & Plan  A assessment:  · POA status post fall. Ambulates with assisted rollator walker at baseline fell back hitting her Head, neck and back with coffee table. Endorse had a fall last week similar where she fell back from rollator walker. · Prior to fall denied any lightheadedness or dizziness as well as no palpitations however during orthostasis at the ED felt dizzy upon standing. · At the ED blood pressure was noted significantly elevated despite home medications taken today however subsequently labile blood pressures with associated tachycardia.   · ECG noted sinus tachycardia heart rate 105 with LBBB however this is not new compared to last  February 2023  · Orthostats positive on 27AUG23  · Pt lives in 1st floor apartment with son (according to daughter, the son also has health concerns and cannot take care of pt constantly)  · PT/OT has evaluated her and determined she is fit for discharge    Plan:  · Plan to discharge today  · Patient follows with [employer] which helped her medically and physically with chores around her house  · She also has a private nurse to help her as needed that she can call upon  · Continue Toprol XL 25 mg daily   · FU with PCP, neurologist  · Get audiometry testing     Abnormal urinalysis  Assessment & Plan  Assessment:  · POA UA moderate leukocyte with associated epithelial cell contaminate sample  · Patient continues to denies any symptoms  · We will monitor off antibiotic    Plan:  · Nothing to do    Anemia  Assessment & Plan  · Hx Iron deficiency anemia  · POA Hb 11.2 baseline seems to be 10-11  · Hgb 8/27 was 10.3    Overactive bladder  Assessment & Plan  · Continue oxybutynin 10 mg once a day per hospital formulary for Sanctura 60 mg    Hypothyroidism  Assessment & Plan  · Continue home levothyroxine 37.5 milligram once a day    Hypertensive urgency  Assessment & Plan  · POA  -247/  · Orthostasis positive with labile BP at the ED receive 500 cc bolus LR  · Upon my evaluation patient does not seem to be hypovolemic  Blood Pressure: 145/92  · Acceptable at this time    Plan:  · Continue home Toprol-XL 25 mg once a day  · Continue lisinopril 20 mg once a day  · Medication reconciliation with paperwork provided by patient does not take hydrochlorothiazide or chlorthalidone as written in epic. Chronic pain disorder  Assessment & Plan  · History thoracolumbar spinal stenosis  · Negative pan CT  · Continue home Percocet  milligram every 4 hours as needed  · Continue aqua K after fall  · Continue Lidoderm patch for neck pain. Type 2 diabetes mellitus with hyperglycemia, without long-term current use of insulin [facility])  Assessment & Plan  Lab Results   Component Value Date    HGBA1C 6.6 (H) 06/09/2023       Recent Labs     08/27/23  1546 08/27/23  2102 08/28/23  0734 08/28/23  1052   POCGLU 173* 170* 160* 259*       Blood Sugar Average: Last 72 hrs:  · (P) [id number] home metformin   · Continue sliding scale  · Continue carbohydrate controlled diet.       Medical Problems     Resolved Problems  Date Reviewed: 8/27/2023   None       Discharging Resident: Jameson Edmonds MD  Discharging Attending: Janine John MD  PCP: Mario Barnett  Admission Date:   Admission Orders (From admission, onward)     Ordered        08/27/23 1423  Inpatient Admission  Once            08/26/23 2130  Place in Observation  Once                      Discharge Date: 08/28/23    Consultations During Hospital Stay:  · None    Procedures Performed:   · None    Significant Findings / Test Results:   · None    Incidental Findings:   · None     Test Results Pending at Discharge (will require follow up): · None     Outpatient Tests Requested:  · Audiometry testing for Mèniére's Disease  · Neurology evaluation to be set up through [employer]     Complications:  None    Reason for Admission: [facility] Course: Franko Macias is a 78 y.o. female patient with PMH of chronic pain syndrome, COPD, hypertension, hyperlipidemia, prior laminectomy, prior spinal fusion, spinal cord stimulator type 2 diabetes on insulin, overactive bladder, hypothyroidism who originally presented to the hospital on 8/26/2023 due to fall where she struck her upper and mid back on a coffee table breaking the table in half. She did not hit her head or lose consciousnes. In the hospital she had a CT head without contrast which showed no acute intracranial abnormality. CT spine cervical without contrast showed no signs of cervical spine fracture or traumatic malalignment, CT chest abdomen pelvis without contrast showed no evidence of acute traumatic injury throughout the chest abdomen or pelvis, CT recon thoracolumbar showed no fracture or traumatic subluxation and and a T12-L5 posterior fusion that was intact. X-ray of the elbow showed no acute osseous abnormality. She did have elevated blood pressures in the ED and was found to be orthostatic and received 1 bolus lactated Ringer. She was treated with her home dose blood pressure medications for high blood pressures which subsequently resolved and metoclopramide for occasional nausea. PT/OT saw her and deemed her safe for discharge. She wishes to go home and defers skilled nursing facility as she follows with "[employer]" which helps her physically complete tasks around her home and provides physical therapy. She also has a private nurse to help her as needed. The patient, initially admitted to the hospital as inpatient, was discharged earlier than expected given the following: chronic ambulatory dysfunction .     Please see above list of diagnoses and related plan for additional information. Condition at Discharge: good    Discharge Day Visit / Exam:   Subjective: No overnight events. Patient examined at bedside and complained of a headache and persistent back pain for which she received Tylenol with improvement from 8 to 4 pain. She also complained of some nausea. She had no other complaints. Vitals: Blood Pressure: 145/92 (08/28/23 1554)  Pulse: 86 (08/28/23 1554)  Temperature: 98.3 °F (36.8 °C) (08/28/23 1554)  Temp Source: Oral (08/27/23 1510)  Respirations: 18 (08/28/23 1554)  Height: 5' (152.4 cm) (08/26/23 1521)  Weight - Scale: 69.4 kg (153 lb) (08/28/23 0540)  SpO2: 97 % (08/28/23 1554)  Exam:   Physical Exam  Vitals and nursing note reviewed. Constitutional:       General: She is not in acute distress. Appearance: She is well-developed. She is not ill-appearing. HENT:      Head: Normocephalic and atraumatic. Right Ear: External ear normal.      Left Ear: External ear normal.      Nose: Nose normal.      Mouth/Throat:      Mouth: Mucous membranes are moist.   Eyes:      General: No scleral icterus. Extraocular Movements: Extraocular movements intact. Conjunctiva/sclera: Conjunctivae normal.      Pupils: Pupils are equal, round, and reactive to light. Cardiovascular:      Rate and Rhythm: Normal rate and regular rhythm. Pulses: Normal pulses. Heart sounds: Normal heart sounds. No murmur heard. Pulmonary:      Effort: Pulmonary effort is normal. No respiratory distress. Breath sounds: Normal breath sounds. Abdominal:      General: Bowel sounds are normal. There is no distension. Palpations: Abdomen is soft. Tenderness: There is abdominal tenderness. Comments: Across all quadrants     Musculoskeletal:         General: No swelling. Cervical back: Normal range of motion and neck supple. Right lower leg: Edema present. Left lower leg: Edema present.       Comments: Bilateral lower extremity edema up to ankles +1   Skin:     General: Skin is warm and dry. Capillary Refill: Capillary refill takes less than 2 seconds. Neurological:      General: No focal deficit present. Mental Status: She is alert. Comments: Tender to palpation in the paraspinal muscles of the lumbar spine. Psychiatric:         Mood and Affect: Mood normal.          Discussion with Family: Updated  (daughter) via phone. Discharge instructions/Information to patient and family:   See after visit summary for information provided to patient and family. Provisions for Follow-Up Care:  See after visit summary for information related to follow-up care and any pertinent home health orders. Disposition:   Home    Planned Readmission: No    Discharge Medications:  See after visit summary for reconciled discharge medications provided to patient and/or family.       **Please Note: This note may have been constructed using a voice recognition system**

## 2023-08-28 NOTE — PLAN OF CARE
Problem: PHYSICAL THERAPY ADULT  Goal: Performs mobility at highest level of function for planned discharge setting. See evaluation for individualized goals. Description: Treatment/Interventions: Functional transfer training, LE strengthening/ROM, Elevations, Therapeutic exercise, Endurance training, Cognitive reorientation, Patient/family training, Equipment eval/education, Gait training, Bed mobility, Spoke to nursing, Spoke to case management, Spoke to MD, OT  Equipment Recommended: Aidan Harvey       See flowsheet documentation for full assessment, interventions and recommendations. Note: Prognosis: Fair  Problem List: Pain, Impaired vision, Decreased coordination, Decreased mobility, Impaired balance, Decreased endurance (gait deviations, lightheadedness)  Assessment: Pt is a 78 y.o. female seen for PT evaluation s/p admit to [address] [place] [address] on 8/26/2023 w/ Ambulatory dysfunction. Order placed for PT. Prior to admission: Pt lived w/family in one story home w/4 ELLYN-used rolling vs rollator walker. Upon evaluation: Pt needed no physical A for transfers nor gait using walker in room, but displayed gait deviations as well as c/o lightheadedness and +nystagmus . Pt's clinical presentation is currently unstable/unpredictable given the functional mobility deficits above, especially (but not limited to) gait deviations, pain and decreased functional mobility tolerance, and combined with medical complications of hypertension , tachycardia, fear/retreat and +orthostatics yesterday (and today via nursing vitals post session). She is at risk for falls based on hx of falls, impaired balance, altered vision and abnormal TUG score. During this admission, pt would benefit from continued skilled inpatient PT in the acute care setting in order to address deficits as defined above to maximize function and mobility.       Recommendations:    From a PT standpoint, recommend next several sessions focus on continued mobility training w/walker, transfer training, stair training. Barriers to Discharge: Inaccessible home environment     PT Discharge Recommendation: Home with home health rehabilitation    See flowsheet documentation for full assessment.

## 2023-08-28 NOTE — PLAN OF CARE
Problem: Potential for Falls  Goal: Patient will remain free of falls  Description: INTERVENTIONS:  - Educate patient/family on patient safety including physical limitations  - Instruct patient to call for assistance with activity   - Consult OT/PT to assist with strengthening/mobility   - Keep Call bell within reach  - Keep bed low and locked with side rails adjusted as appropriate  - Keep care items and personal belongings within reach  - Initiate and maintain comfort rounds  - Make Fall Risk Sign visible to staff  - Offer Toileting every 2 Hours, in advance of need  - Initiate/Maintain bed/chair alarm  - Obtain necessary fall risk management equipment:   - Apply yellow socks and bracelet for high fall risk patients  - Consider moving patient to room near nurses station  Outcome: Progressing     Problem: MOBILITY - ADULT  Goal: Maintain or return to baseline ADL function  Description: INTERVENTIONS:  -  Assess patient's ability to carry out ADLs; assess patient's baseline for ADL function and identify physical deficits which impact ability to perform ADLs (bathing, care of mouth/teeth, toileting, grooming, dressing, etc.)  - Assess/evaluate cause of self-care deficits   - Assess range of motion  - Assess patient's mobility; develop plan if impaired  - Assess patient's need for assistive devices and provide as appropriate  - Encourage maximum independence but intervene and supervise when necessary  - Involve family in performance of ADLs  - Assess for home care needs following discharge   - Consider OT consult to assist with ADL evaluation and planning for discharge  - Provide patient education as appropriate  Outcome: Progressing  Goal: Maintains/Returns to pre admission functional level  Description: INTERVENTIONS:  - Perform BMAT or MOVE assessment daily.   - Set and communicate daily mobility goal to care team and patient/family/caregiver.    - Collaborate with rehabilitation services on mobility goals if consulted  - Perform Range of Motion 3 times a day.   - Ambulate patient 3 times a day  - Out of bed to chair 3 times a day   - Out of bed for meals 3  times a day  - Out of bed for toileting  - Record patient progress and toleration of activity level   Outcome: Progressing

## 2023-08-28 NOTE — ASSESSMENT & PLAN NOTE
A assessment:  · POA status post fall. Ambulates with assisted rollator walker at baseline fell back hitting her Head, neck and back with coffee table. Drea had a fall last week similar where she fell back from rollator walker. · Prior to fall denied any lightheadedness or dizziness as well as no palpitations however during orthostasis at the ED felt dizzy upon standing. · At the ED blood pressure was noted significantly elevated despite home medications taken today however subsequently labile blood pressures with associated tachycardia.   · ECG noted sinus tachycardia heart rate 105 with LBBB however this is not new compared to last  February 2023  · Orthostats positive on 02IWP48  · Pt lives in 1st floor apartment with son (according to daughter, the son also has health concerns and cannot take care of pt constantly)  · PT/OT has evaluated her and determined she is fit for discharge    Plan:  · Plan to discharge today  · Patient follows with [employer] which helped her medically and physically with chores around her house  · She also has a private nurse to help her as needed that she can call upon  · Continue Toprol XL 25 mg daily   · FU with PCP, neurologist  · Get audiometry testing

## 2023-08-28 NOTE — ASSESSMENT & PLAN NOTE
· History thoracolumbar spinal stenosis  · Negative pan CT  · Continue home Percocet  milligram every 4 hours as needed  · Continue aqua K after fall  · Continue Lidoderm patch for neck pain.

## 2023-08-28 NOTE — CASE MANAGEMENT
Case Management Progress Note    Patient name Kimo Ro  Location S /S -01 MRN [id number]  : 1945 Date 2023       LOS (days): 1  Geometric Mean LOS (GMLOS) (days): 2.80  Days to GMLOS:1.7        OBJECTIVE:        Current admission status: Inpatient  Preferred Pharmacy:   [facility] [id number] - [place], [address] [facility]  [address] [facility] [address] [facility]  Phone: [phone] Fax: [phone] [address] [facility] [address] [place], [place] - [address] [facility]  [address] [facility] [address]  Phone: [phone] Fax: [phone]    [facility] [address] [place], [place] - [address]  [address]  [address] [facility] [address]  Phone: [phone] Fax: [phone]    [facility] [address] [place] [address]  Phone: [phone] Fax: [phone]    Primary Care Provider: NANY Meadows    Primary Insurance: [address] [facility] [address] [facility]  Secondary Insurance:     PROGRESS NOTE:      Cm faxed over AVS as per Duarte's request from [employer] to fax# [phone].

## 2023-08-28 NOTE — ASSESSMENT & PLAN NOTE
· POA  -247/  · Orthostasis positive with labile BP at the ED receive 500 cc bolus LR  · Upon my evaluation patient does not seem to be hypovolemic  Blood Pressure: 145/92  · Acceptable at this time    Plan:  · Continue home Toprol-XL 25 mg once a day  · Continue lisinopril 20 mg once a day  · Medication reconciliation with paperwork provided by patient does not take hydrochlorothiazide or chlorthalidone as written in epic.

## 2023-08-28 NOTE — ASSESSMENT & PLAN NOTE
Lab Results   Component Value Date    HGBA1C 6.6 (H) 06/09/2023       Recent Labs     08/27/23  1546 08/27/23  2102 08/28/23  0734 08/28/23  1052   POCGLU 173* 170* 160* 259*       Blood Sugar Average: Last 72 hrs:  · (P) [id number] home metformin   · Continue sliding scale  · Continue carbohydrate controlled diet.

## 2023-08-28 NOTE — ASSESSMENT & PLAN NOTE
· POA  -247/  · Orthostasis positive with labile BP at the ED receive 500 cc bolus LR  · Upon my evaluation patient does not seem to be hypovolemic  Blood Pressure: 137/96  · Acceptable at this time    Plan:  · Continue home Toprol-XL 25 mg once a day  · Continue lisinopril 20 mg once a day  · Medication reconciliation with paperwork provided by patient does not take hydrochlorothiazide or chlorthalidone as written in epic.

## 2023-08-28 NOTE — ASSESSMENT & PLAN NOTE
Lab Results   Component Value Date    HGBA1C 6.6 (H) 06/09/2023       Recent Labs     08/27/23  1114 08/27/23  1546 08/27/23  2102 08/28/23  0734   POCGLU 270* 173* 170* 160*       Blood Sugar Average: Last 72 hrs:  · (P) 174.5Holding home metformin   · Continue sliding scale  · Continue carbohydrate controlled diet.

## 2023-08-28 NOTE — CASE MANAGEMENT
Case Management Discharge Planning Note    Patient name Sal Every  Location S /S -01 MRN [id number]  : 1945 Date 2023       Current Admission Date: 2023  Current Admission Diagnosis:Ambulatory dysfunction   Patient Active Problem List    Diagnosis Date Noted   • Abnormal urinalysis 2023   • Chronic obstructive pulmonary disease, unspecified COPD type ([address]) 2023   • Confusion with body shakes and blank stare 2023   • Normocytic anemia 2023   • Bilateral lower extremity edema 2023   • Continuous opioid dependence ([address]) 2022   • Cerebrovascular accident (CVA), unspecified mechanism ([address]) 2022   • Ambulatory dysfunction 2022   • Anemia 2022   • Obesity, morbid ([address]) 2021   • Prepyloric ulcer 2021   • Diarrhea 2021   • Mild intermittent asthma without complication    • Iron deficiency anemia secondary to inadequate dietary iron intake 2018   • Type 2 diabetes mellitus with hyperglycemia, without long-term current use of insulin ([address])    • Failed back surgical syndrome 2018   • Hypothyroidism 2017   • Degenerative lumbar spinal stenosis 2017   • Glaucoma 2017   • Overactive bladder 2016   • Dyspepsia 2016   • Hypercholesterolemia 2016   • Anxiety 2015   • Chronic pain disorder 2013   • Hypertensive urgency 2013      LOS (days): 1  Geometric Mean LOS (GMLOS) (days): 2.80  Days to GMLOS:1.8     OBJECTIVE:  Risk of Unplanned Readmission Score: 23.03         Current admission status: Inpatient   Preferred Pharmacy:   [address] [place] [address]  Phone: [phone] Fax: [phone]    [facility] [address] [place] [address]  Phone: [phone] Fax: [phone]    [address] [place] [address] [address] [place], [place] - [address] [facility] [address]  [address] [facility] [address] [facility] [address]  [address] [address]  Phone: [phone] Fax: [phone]    [facility] [address] [place] [address], [address] [place] [address]  Phone: [phone] Fax: [phone]    Primary Care Provider: NANY Baldwin    Primary Insurance: [employer] [address] [facility]  Secondary Insurance:     DISCHARGE DETAILS:    Discharge planning discussed with[de-identified] Patient and daughter - Paco Rodarte  Freedom of Choice: Yes  Comments - Freedom of Choice: CM discussed discharge plans per care team recommendations - patient and daughter confirmed she has services at home for PT/OT and aides who assist with bathing twice a week. CM contacted Brent Arce from [employer] who confirmed these services will be in place once she discharges home and that no referrals or scripts are required at discharge.   CM contacted family/caregiver?: Yes  Were Treatment Team discharge recommendations reviewed with patient/caregiver?: Yes  Did patient/caregiver verbalize understanding of patient care needs?: Yes  Were patient/caregiver advised of the risks associated with not following Treatment Team discharge recommendations?: Yes    Contacts  Patient Contacts: Carjuany Graf, daughter  Relationship to Patient[de-identified] Family  Contact Method: Phone  Phone Number: See face sheet  Reason/Outcome: Discharge [address] [facility] Lorenzo Lombardo         Is the patient interested in [address] at discharge?:  (Ventura Shala will be provided by [employer] - no orders from the hospital are required as per Brent Arce.)    DME Referral Provided  Referral made for DME?: No    Other Referral/Resources/Interventions Provided:  Interventions: C  Referral Comments: Via [employer]    Would you like to participate in our [address] [facility] [address] service program?  : No - Declined    Treatment Team Recommendation: Home with [address] [facility] (Ventura Shala service to be provided by [employer])  Discharge Destination Plan[de-identified] Home with [address] (Home Health service to be provided by [employer])        Patient's son-in-law will  patient at discharge this evening.

## 2023-08-28 NOTE — ASSESSMENT & PLAN NOTE
Assessment:  · POA UA moderate leukocyte with associated epithelial cell contaminate sample  · Patient continues to denies any symptoms  · We will monitor off antibiotic    Plan:  · Nothing to do

## 2023-08-28 NOTE — ASSESSMENT & PLAN NOTE
A assessment:  · POA status post fall. Ambulates with assisted rollator walker at baseline fell back hitting her Head, neck and back with coffee table. Drea had a fall last week similar where she fell back from rollator walker. · Prior to fall denied any lightheadedness or dizziness as well as no palpitations however during orthostasis at the ED felt dizzy upon standing. · At the ED blood pressure was noted significantly elevated despite home medications taken today however subsequently labile blood pressures with associated tachycardia.   · ECG noted sinus tachycardia heart rate 105 with LBBB however this is not new compared to last  February 2023  · Orthostats positive on [id number]  · Pt lives in 1st floor apartment with son (according to daughter, the son also has health concerns and cannot take care of pt constantly)  · PT/OT has evaluated her and determined she is fit for discharge    Plan:  · Plan to discharge today  · Patient follows with [employer] which helped her medically and physically with chores around her house  · She also has a private nurse to help her as needed that she can call upon  · Continue Toprol XL 25 mg daily   · FU with PCP

## 2023-08-29 NOTE — UTILIZATION REVIEW
NOTIFICATION OF ADMISSION DISCHARGE   This is a Notification of Discharge from [facility]. Please be advised that this patient has been discharge from our facility. Below you will find the admission and discharge date and time including the patient’s disposition. UTILIZATION REVIEW CONTACT:  Silvana Ladd  Utilization   Network Utilization Review Department  Phone: [phone] x carefully listen to the prompts. All voicemails are confidential.  Email: [email or web address]     ADMISSION INFORMATION  PRESENTATION DATE: 8/26/2023  3:12 PM  OBERVATION ADMISSION DATE:   INPATIENT ADMISSION DATE: 8/27/23  2:23 PM   DISCHARGE DATE: 8/28/2023  4:47 PM   DISPOSITION:Home with [address] INFORMATION:  Send all requests for admission clinical reviews, approved or denied determinations and any other requests to dedicated fax number below belonging to the campus where the patient is receiving treatment.  List of dedicated fax numbers:  [place] DENIALS (Administrative/Medical Necessity) [phone] 2303 [facility] (Maternity/NICU/Pediatrics) [phone]   [facility] [phone]   [facility] [phone] [address] [facility] [phone]   [address] [place] [address] [phone]   [facility] [phone]   [address] [place] [address] [address] [place] [address] [phone]   [address] [place] [address] [phone] [address] [facility] [phone] 27[address] [facility] [address] [facility] [phone]

## 2023-08-30 ENCOUNTER — TRANSITIONAL CARE MANAGEMENT (OUTPATIENT)
Dept: FAMILY MEDICINE CLINIC | Facility: CLINIC | Age: 78
End: 2023-08-30

## 2023-09-01 LAB
BACTERIA BLD CULT: NORMAL
BACTERIA BLD CULT: NORMAL

## 2023-09-15 ENCOUNTER — LAB REQUISITION (OUTPATIENT)
Dept: LAB | Facility: HOSPITAL | Age: 78
End: 2023-09-15
Payer: MEDICARE

## 2023-09-15 DIAGNOSIS — Z00.00 ENCOUNTER FOR GENERAL ADULT MEDICAL EXAMINATION WITHOUT ABNORMAL FINDINGS: ICD-10-CM

## 2023-09-15 LAB
BASOPHILS # BLD AUTO: 0.05 THOUSANDS/ÂΜL (ref 0–0.1)
BASOPHILS NFR BLD AUTO: 1 % (ref 0–1)
EOSINOPHIL # BLD AUTO: 0.41 THOUSAND/ÂΜL (ref 0–0.61)
EOSINOPHIL NFR BLD AUTO: 6 % (ref 0–6)
ERYTHROCYTE [DISTWIDTH] IN BLOOD BY AUTOMATED COUNT: 13.9 % (ref 11.6–15.1)
HCT VFR BLD AUTO: 33.8 % (ref 34.8–46.1)
HGB BLD-MCNC: 10.2 G/DL (ref 11.5–15.4)
IMM GRANULOCYTES # BLD AUTO: 0.01 THOUSAND/UL (ref 0–0.2)
IMM GRANULOCYTES NFR BLD AUTO: 0 % (ref 0–2)
LYMPHOCYTES # BLD AUTO: 1.66 THOUSANDS/ÂΜL (ref 0.6–4.47)
LYMPHOCYTES NFR BLD AUTO: 25 % (ref 14–44)
MCH RBC QN AUTO: 28.3 PG (ref 26.8–34.3)
MCHC RBC AUTO-ENTMCNC: 30.2 G/DL (ref 31.4–37.4)
MCV RBC AUTO: 94 FL (ref 82–98)
MONOCYTES # BLD AUTO: 0.64 THOUSAND/ÂΜL (ref 0.17–1.22)
MONOCYTES NFR BLD AUTO: 10 % (ref 4–12)
NEUTROPHILS # BLD AUTO: 3.88 THOUSANDS/ÂΜL (ref 1.85–7.62)
NEUTS SEG NFR BLD AUTO: 58 % (ref 43–75)
NRBC BLD AUTO-RTO: 0 /100 WBCS
PLATELET # BLD AUTO: 315 THOUSANDS/UL (ref 149–390)
PMV BLD AUTO: 10.6 FL (ref 8.9–12.7)
RBC # BLD AUTO: 3.6 MILLION/UL (ref 3.81–5.12)
WBC # BLD AUTO: 6.65 THOUSAND/UL (ref 4.31–10.16)

## 2023-09-15 PROCEDURE — 85025 COMPLETE CBC W/AUTO DIFF WBC: CPT | Performed by: FAMILY MEDICINE

## 2023-09-18 PROCEDURE — 87635 SARS-COV-2 COVID-19 AMP PRB: CPT | Performed by: FAMILY MEDICINE

## 2023-09-19 ENCOUNTER — LAB REQUISITION (OUTPATIENT)
Dept: LAB | Facility: HOSPITAL | Age: 78
End: 2023-09-19
Payer: MEDICARE

## 2023-09-19 DIAGNOSIS — Z00.00 ENCOUNTER FOR GENERAL ADULT MEDICAL EXAMINATION WITHOUT ABNORMAL FINDINGS: ICD-10-CM

## 2023-09-19 LAB — SARS-COV-2 RNA RESP QL NAA+PROBE: NEGATIVE

## 2023-10-02 ENCOUNTER — HOSPITAL ENCOUNTER (OUTPATIENT)
Dept: RADIOLOGY | Age: 78
Discharge: HOME | End: 2023-10-02

## 2023-10-02 DIAGNOSIS — R42 DIZZINESS OF UNKNOWN ETIOLOGY: ICD-10-CM

## 2023-10-17 ENCOUNTER — HOSPITAL ENCOUNTER (OUTPATIENT)
Dept: CT IMAGING | Facility: HOSPITAL | Age: 78
Discharge: HOME | End: 2023-10-17

## 2023-10-17 PROCEDURE — G1004 CDSM NDSC: HCPCS

## 2023-10-17 PROCEDURE — 70496 CT ANGIOGRAPHY HEAD: CPT

## 2023-10-17 PROCEDURE — 70498 CT ANGIOGRAPHY NECK: CPT

## 2023-10-17 RX ADMIN — IOHEXOL 85 ML: 350 INJECTION, SOLUTION INTRAVENOUS at 14:37

## 2023-10-20 ENCOUNTER — CLINICAL SUPPORT (OUTPATIENT)
Dept: FAMILY MEDICINE CLINIC | Facility: CLINIC | Age: 78
End: 2023-10-20

## 2023-10-20 DIAGNOSIS — Z23 NEED FOR PNEUMOCOCCAL VACCINE: Primary | ICD-10-CM

## 2023-10-20 DIAGNOSIS — Z23 ENCOUNTER FOR IMMUNIZATION: Primary | ICD-10-CM

## 2023-11-10 ENCOUNTER — HOSPITAL ENCOUNTER (INPATIENT)
Facility: HOSPITAL | Age: 78
LOS: 5 days | Discharge: HOME | DRG: 286 | End: 2023-11-15
Attending: EMERGENCY MEDICINE | Admitting: INTERNAL MEDICINE
Payer: MEDICARE

## 2023-11-10 ENCOUNTER — APPOINTMENT (EMERGENCY)
Dept: RADIOLOGY | Facility: HOSPITAL | Age: 78
DRG: 286 | End: 2023-11-10
Payer: MEDICARE

## 2023-11-10 DIAGNOSIS — M54.50 LUMBAR BACK PAIN: ICD-10-CM

## 2023-11-10 DIAGNOSIS — D64.9 ANEMIA, UNSPECIFIED TYPE: ICD-10-CM

## 2023-11-10 DIAGNOSIS — R06.02 SHORTNESS OF BREATH: Primary | ICD-10-CM

## 2023-11-10 DIAGNOSIS — M96.1 POST LAMINECTOMY SYNDROME: ICD-10-CM

## 2023-11-10 DIAGNOSIS — R07.9 CHEST PAIN: ICD-10-CM

## 2023-11-10 DIAGNOSIS — N17.9 AKI (ACUTE KIDNEY INJURY) (HCC): ICD-10-CM

## 2023-11-10 DIAGNOSIS — G62.9 NEUROPATHY: ICD-10-CM

## 2023-11-10 DIAGNOSIS — R94.31 ABNORMAL EKG: ICD-10-CM

## 2023-11-10 DIAGNOSIS — I25.10 CAD (CORONARY ARTERY DISEASE): ICD-10-CM

## 2023-11-10 PROBLEM — Z98.890 HISTORY OF LUMBAR SURGERY: Status: ACTIVE | Noted: 2023-11-10

## 2023-11-10 LAB
2HR DELTA HS TROPONIN: 2 NG/L
4HR DELTA HS TROPONIN: 4 NG/L
ALBUMIN SERPL BCP-MCNC: 3.7 G/DL (ref 3.5–5)
ALP SERPL-CCNC: 84 U/L (ref 34–104)
ALT SERPL W P-5'-P-CCNC: 8 U/L (ref 7–52)
ANION GAP SERPL CALCULATED.3IONS-SCNC: 10 MMOL/L
APTT PPP: 194 SECONDS (ref 23–37)
APTT PPP: 28 SECONDS (ref 23–37)
AST SERPL W P-5'-P-CCNC: 14 U/L (ref 13–39)
ATRIAL RATE: 100 BPM
ATRIAL RATE: 106 BPM
BASOPHILS # BLD AUTO: 0.06 THOUSANDS/ÂΜL (ref 0–0.1)
BASOPHILS NFR BLD AUTO: 1 % (ref 0–1)
BILIRUB SERPL-MCNC: 0.28 MG/DL (ref 0.2–1)
BNP SERPL-MCNC: 25 PG/ML (ref 0–100)
BUN SERPL-MCNC: 33 MG/DL (ref 5–25)
CALCIUM SERPL-MCNC: 9.3 MG/DL (ref 8.4–10.2)
CARDIAC TROPONIN I PNL SERPL HS: 10 NG/L
CARDIAC TROPONIN I PNL SERPL HS: 12 NG/L
CARDIAC TROPONIN I PNL SERPL HS: 14 NG/L
CHLORIDE SERPL-SCNC: 101 MMOL/L (ref 96–108)
CO2 SERPL-SCNC: 25 MMOL/L (ref 21–32)
CREAT SERPL-MCNC: 1.48 MG/DL (ref 0.6–1.3)
D DIMER PPP FEU-MCNC: 0.86 UG/ML FEU
EOSINOPHIL # BLD AUTO: 0.3 THOUSAND/ÂΜL (ref 0–0.61)
EOSINOPHIL NFR BLD AUTO: 3 % (ref 0–6)
ERYTHROCYTE [DISTWIDTH] IN BLOOD BY AUTOMATED COUNT: 14.6 % (ref 11.6–15.1)
FERRITIN SERPL-MCNC: 21 NG/ML (ref 11–307)
FOLATE SERPL-MCNC: 21 NG/ML
GFR SERPL CREATININE-BSD FRML MDRD: 33 ML/MIN/1.73SQ M
GLUCOSE SERPL-MCNC: 139 MG/DL (ref 65–140)
GLUCOSE SERPL-MCNC: 142 MG/DL (ref 65–140)
GLUCOSE SERPL-MCNC: 169 MG/DL (ref 65–140)
HCT VFR BLD AUTO: 33.6 % (ref 34.8–46.1)
HGB BLD-MCNC: 10.4 G/DL (ref 11.5–15.4)
IMM GRANULOCYTES # BLD AUTO: 0.03 THOUSAND/UL (ref 0–0.2)
IMM GRANULOCYTES NFR BLD AUTO: 0 % (ref 0–2)
INR PPP: 0.9 (ref 0.84–1.19)
IRON SATN MFR SERPL: 9 % (ref 15–50)
IRON SERPL-MCNC: 31 UG/DL (ref 50–212)
LYMPHOCYTES # BLD AUTO: 1.24 THOUSANDS/ÂΜL (ref 0.6–4.47)
LYMPHOCYTES NFR BLD AUTO: 13 % (ref 14–44)
MCH RBC QN AUTO: 28.5 PG (ref 26.8–34.3)
MCHC RBC AUTO-ENTMCNC: 31 G/DL (ref 31.4–37.4)
MCV RBC AUTO: 92 FL (ref 82–98)
MONOCYTES # BLD AUTO: 0.63 THOUSAND/ÂΜL (ref 0.17–1.22)
MONOCYTES NFR BLD AUTO: 7 % (ref 4–12)
NEUTROPHILS # BLD AUTO: 7.29 THOUSANDS/ÂΜL (ref 1.85–7.62)
NEUTS SEG NFR BLD AUTO: 76 % (ref 43–75)
NRBC BLD AUTO-RTO: 0 /100 WBCS
P AXIS: 27 DEGREES
P AXIS: 28 DEGREES
PLATELET # BLD AUTO: 263 THOUSANDS/UL (ref 149–390)
PLATELET # BLD AUTO: 281 THOUSANDS/UL (ref 149–390)
PMV BLD AUTO: 10.8 FL (ref 8.9–12.7)
PMV BLD AUTO: 11 FL (ref 8.9–12.7)
POTASSIUM SERPL-SCNC: 4.6 MMOL/L (ref 3.5–5.3)
PR INTERVAL: 138 MS
PR INTERVAL: 152 MS
PROT SERPL-MCNC: 6.4 G/DL (ref 6.4–8.4)
PROTHROMBIN TIME: 12.7 SECONDS (ref 11.6–14.5)
QRS AXIS: -10 DEGREES
QRS AXIS: -25 DEGREES
QRSD INTERVAL: 126 MS
QRSD INTERVAL: 126 MS
QT INTERVAL: 396 MS
QT INTERVAL: 406 MS
QTC INTERVAL: 523 MS
QTC INTERVAL: 526 MS
RBC # BLD AUTO: 3.65 MILLION/UL (ref 3.81–5.12)
SODIUM SERPL-SCNC: 136 MMOL/L (ref 135–147)
T WAVE AXIS: -4 DEGREES
T WAVE AXIS: 30 DEGREES
TIBC SERPL-MCNC: 331 UG/DL (ref 250–450)
UIBC SERPL-MCNC: 300 UG/DL (ref 155–355)
VENTRICULAR RATE: 100 BPM
VENTRICULAR RATE: 106 BPM
VIT B12 SERPL-MCNC: 311 PG/ML (ref 180–914)
WBC # BLD AUTO: 9.55 THOUSAND/UL (ref 4.31–10.16)

## 2023-11-10 PROCEDURE — 80053 COMPREHEN METABOLIC PANEL: CPT | Performed by: EMERGENCY MEDICINE

## 2023-11-10 PROCEDURE — 83550 IRON BINDING TEST: CPT | Performed by: PHARMACIST

## 2023-11-10 PROCEDURE — 99285 EMERGENCY DEPT VISIT HI MDM: CPT

## 2023-11-10 PROCEDURE — 82607 VITAMIN B-12: CPT | Performed by: PHARMACIST

## 2023-11-10 PROCEDURE — 36415 COLL VENOUS BLD VENIPUNCTURE: CPT | Performed by: EMERGENCY MEDICINE

## 2023-11-10 PROCEDURE — 82746 ASSAY OF FOLIC ACID SERUM: CPT | Performed by: PHARMACIST

## 2023-11-10 PROCEDURE — 85379 FIBRIN DEGRADATION QUANT: CPT | Performed by: EMERGENCY MEDICINE

## 2023-11-10 PROCEDURE — 71046 X-RAY EXAM CHEST 2 VIEWS: CPT

## 2023-11-10 PROCEDURE — 85610 PROTHROMBIN TIME: CPT | Performed by: EMERGENCY MEDICINE

## 2023-11-10 PROCEDURE — 82728 ASSAY OF FERRITIN: CPT | Performed by: PHARMACIST

## 2023-11-10 PROCEDURE — 99285 EMERGENCY DEPT VISIT HI MDM: CPT | Performed by: EMERGENCY MEDICINE

## 2023-11-10 PROCEDURE — 93010 ELECTROCARDIOGRAM REPORT: CPT | Performed by: INTERNAL MEDICINE

## 2023-11-10 PROCEDURE — 85730 THROMBOPLASTIN TIME PARTIAL: CPT | Performed by: INTERNAL MEDICINE

## 2023-11-10 PROCEDURE — 83880 ASSAY OF NATRIURETIC PEPTIDE: CPT | Performed by: EMERGENCY MEDICINE

## 2023-11-10 PROCEDURE — 84484 ASSAY OF TROPONIN QUANT: CPT | Performed by: EMERGENCY MEDICINE

## 2023-11-10 PROCEDURE — 93005 ELECTROCARDIOGRAM TRACING: CPT

## 2023-11-10 PROCEDURE — 99223 1ST HOSP IP/OBS HIGH 75: CPT | Performed by: INTERNAL MEDICINE

## 2023-11-10 PROCEDURE — 82948 REAGENT STRIP/BLOOD GLUCOSE: CPT

## 2023-11-10 PROCEDURE — 4A023N7 MEASUREMENT OF CARDIAC SAMPLING AND PRESSURE, LEFT HEART, PERCUTANEOUS APPROACH: ICD-10-PCS | Performed by: INTERNAL MEDICINE

## 2023-11-10 PROCEDURE — 85730 THROMBOPLASTIN TIME PARTIAL: CPT | Performed by: EMERGENCY MEDICINE

## 2023-11-10 PROCEDURE — 85025 COMPLETE CBC W/AUTO DIFF WBC: CPT | Performed by: EMERGENCY MEDICINE

## 2023-11-10 PROCEDURE — 85049 AUTOMATED PLATELET COUNT: CPT | Performed by: PHARMACIST

## 2023-11-10 PROCEDURE — B2111ZZ FLUOROSCOPY OF MULTIPLE CORONARY ARTERIES USING LOW OSMOLAR CONTRAST: ICD-10-PCS | Performed by: INTERNAL MEDICINE

## 2023-11-10 PROCEDURE — 83540 ASSAY OF IRON: CPT | Performed by: PHARMACIST

## 2023-11-10 RX ORDER — LEVOTHYROXINE SODIUM 0.07 MG/1
37.5 TABLET ORAL DAILY
Status: DISCONTINUED | OUTPATIENT
Start: 2023-11-11 | End: 2023-11-15 | Stop reason: HOSPADM

## 2023-11-10 RX ORDER — HEPARIN SODIUM 1000 [USP'U]/ML
5200 INJECTION, SOLUTION INTRAVENOUS; SUBCUTANEOUS EVERY 6 HOURS PRN
Status: DISCONTINUED | OUTPATIENT
Start: 2023-11-10 | End: 2023-11-14

## 2023-11-10 RX ORDER — MECLIZINE HCL 12.5 MG/1
12.5 TABLET ORAL EVERY 8 HOURS PRN
Status: DISCONTINUED | OUTPATIENT
Start: 2023-11-10 | End: 2023-11-15 | Stop reason: HOSPADM

## 2023-11-10 RX ORDER — ASPIRIN 81 MG/1
81 TABLET, CHEWABLE ORAL DAILY
Status: DISCONTINUED | OUTPATIENT
Start: 2023-11-11 | End: 2023-11-13

## 2023-11-10 RX ORDER — ATORVASTATIN CALCIUM 40 MG/1
40 TABLET, FILM COATED ORAL DAILY
Status: DISCONTINUED | OUTPATIENT
Start: 2023-11-11 | End: 2023-11-15 | Stop reason: HOSPADM

## 2023-11-10 RX ORDER — BACLOFEN 10 MG/1
10 TABLET ORAL 2 TIMES DAILY PRN
Status: DISCONTINUED | OUTPATIENT
Start: 2023-11-10 | End: 2023-11-15 | Stop reason: HOSPADM

## 2023-11-10 RX ORDER — GABAPENTIN 300 MG/1
300 CAPSULE ORAL
Status: DISCONTINUED | OUTPATIENT
Start: 2023-11-10 | End: 2023-11-15 | Stop reason: HOSPADM

## 2023-11-10 RX ORDER — SODIUM CHLORIDE, SODIUM GLUCONATE, SODIUM ACETATE, POTASSIUM CHLORIDE, MAGNESIUM CHLORIDE, SODIUM PHOSPHATE, DIBASIC, AND POTASSIUM PHOSPHATE .53; .5; .37; .037; .03; .012; .00082 G/100ML; G/100ML; G/100ML; G/100ML; G/100ML; G/100ML; G/100ML
75 INJECTION, SOLUTION INTRAVENOUS CONTINUOUS
Status: DISPENSED | OUTPATIENT
Start: 2023-11-10 | End: 2023-11-11

## 2023-11-10 RX ORDER — INSULIN LISPRO 100 [IU]/ML
1-5 INJECTION, SOLUTION INTRAVENOUS; SUBCUTANEOUS
Status: DISCONTINUED | OUTPATIENT
Start: 2023-11-10 | End: 2023-11-15 | Stop reason: HOSPADM

## 2023-11-10 RX ORDER — ESCITALOPRAM OXALATE 10 MG/1
10 TABLET ORAL DAILY
Status: DISCONTINUED | OUTPATIENT
Start: 2023-11-11 | End: 2023-11-15 | Stop reason: HOSPADM

## 2023-11-10 RX ORDER — LANOLIN ALCOHOL/MO/W.PET/CERES
6 CREAM (GRAM) TOPICAL
Status: DISCONTINUED | OUTPATIENT
Start: 2023-11-10 | End: 2023-11-15 | Stop reason: HOSPADM

## 2023-11-10 RX ORDER — PANTOPRAZOLE SODIUM 40 MG/1
40 TABLET, DELAYED RELEASE ORAL DAILY
Status: DISCONTINUED | OUTPATIENT
Start: 2023-11-11 | End: 2023-11-15 | Stop reason: HOSPADM

## 2023-11-10 RX ORDER — OXYCODONE HYDROCHLORIDE AND ACETAMINOPHEN 5; 325 MG/1; MG/1
2 TABLET ORAL EVERY 12 HOURS PRN
Status: DISCONTINUED | OUTPATIENT
Start: 2023-11-10 | End: 2023-11-15 | Stop reason: HOSPADM

## 2023-11-10 RX ORDER — OXYBUTYNIN CHLORIDE 5 MG/1
10 TABLET, EXTENDED RELEASE ORAL DAILY
Status: DISCONTINUED | OUTPATIENT
Start: 2023-11-11 | End: 2023-11-15 | Stop reason: HOSPADM

## 2023-11-10 RX ORDER — HEPARIN SODIUM 1000 [USP'U]/ML
5200 INJECTION, SOLUTION INTRAVENOUS; SUBCUTANEOUS ONCE
Status: COMPLETED | OUTPATIENT
Start: 2023-11-10 | End: 2023-11-10

## 2023-11-10 RX ORDER — HEPARIN SODIUM 10000 [USP'U]/100ML
3-30 INJECTION, SOLUTION INTRAVENOUS
Status: DISCONTINUED | OUTPATIENT
Start: 2023-11-10 | End: 2023-11-14

## 2023-11-10 RX ORDER — LATANOPROST 50 UG/ML
1 SOLUTION/ DROPS OPHTHALMIC
Status: DISCONTINUED | OUTPATIENT
Start: 2023-11-10 | End: 2023-11-15 | Stop reason: HOSPADM

## 2023-11-10 RX ORDER — AMOXICILLIN 250 MG
2 CAPSULE ORAL
Status: DISCONTINUED | OUTPATIENT
Start: 2023-11-10 | End: 2023-11-15 | Stop reason: HOSPADM

## 2023-11-10 RX ORDER — HEPARIN SODIUM 1000 [USP'U]/ML
2600 INJECTION, SOLUTION INTRAVENOUS; SUBCUTANEOUS EVERY 6 HOURS PRN
Status: DISCONTINUED | OUTPATIENT
Start: 2023-11-10 | End: 2023-11-14

## 2023-11-10 RX ORDER — METOPROLOL SUCCINATE 25 MG/1
25 TABLET, EXTENDED RELEASE ORAL DAILY
Status: DISCONTINUED | OUTPATIENT
Start: 2023-11-11 | End: 2023-11-13

## 2023-11-10 RX ADMIN — SODIUM CHLORIDE, SODIUM GLUCONATE, SODIUM ACETATE, POTASSIUM CHLORIDE, MAGNESIUM CHLORIDE, SODIUM PHOSPHATE, DIBASIC, AND POTASSIUM PHOSPHATE 75 ML/HR: .53; .5; .37; .037; .03; .012; .00082 INJECTION, SOLUTION INTRAVENOUS at 17:45

## 2023-11-10 RX ADMIN — HEPARIN SODIUM 18 UNITS/KG/HR: 10000 INJECTION, SOLUTION INTRAVENOUS at 16:21

## 2023-11-10 RX ADMIN — Medication 6 MG: at 22:55

## 2023-11-10 RX ADMIN — HEPARIN SODIUM 5200 UNITS: 1000 INJECTION INTRAVENOUS; SUBCUTANEOUS at 16:22

## 2023-11-10 RX ADMIN — SENNOSIDES, DOCUSATE SODIUM 2 TABLET: 8.6; 5 TABLET ORAL at 22:55

## 2023-11-10 NOTE — ASSESSMENT & PLAN NOTE
POA Cr 1.48 with baseline 0.8-1. Possible due to decreased PO intake per daughter report    Plan  Isolyte 75 mL/hr x10 hours  Encourage PO intake  Hold PTA lisinopril in setting of SADAF  Repeat BMP.  Replete electrolytes prn

## 2023-11-10 NOTE — ED PROVIDER NOTES
History  Chief Complaint   Patient presents with    Shortness of Breath     Pt has SOB with activity. Pt sent in from 6 month health check because her toes were blue     HPI    64-year-old female with history of hypertension sent in from her PCPs office today due to chest pressure with exertion as well as shortness of breath. After walking into her PCPs office, the patient's heart rate was found to be in the 160s and SPO2 in the mid 70s. This came up with oxygen. She is not hypoxic on arrival and denies ongoing shortness of breath. Reports shortness of breath with activity over the last couple of weeks as well as chest tightness with activity. No cough, fevers, or chills. Reports constipation with last bowel movement 1 week ago. Abdomen feels bloated but denies severe pain. She is passing flatus. No history of CAD or A-fib. No history of thromboembolism. Denies leg swelling. Prior to Admission Medications   Prescriptions Last Dose Informant Patient Reported? Taking? Blood Glucose Monitoring Suppl (OneTouch Verio Reflect) w/Device KIT   No No   Sig: Check blood sugars twice daily. Please substitute with appropriate alternative as covered by patient's insurance. Dx: E11.65   Milnacipran HCl (Savella) 100 MG TABS   No No   Sig: Take 1 tablet (100 mg total) by mouth daily   OneTouch Delica Lancets 05I MISC   No No   Sig: Check blood sugars twice daily. Please substitute with appropriate alternative as covered by patient's insurance.  Dx: E11.65   albuterol (PROVENTIL HFA,VENTOLIN HFA) 90 mcg/act inhaler  Self No No   Sig: Inhale 2 puffs every 6 (six) hours as needed for wheezing or shortness of breath   aspirin 81 mg chewable tablet  Self No No   Sig: Chew 1 tablet (81 mg total) daily   atorvastatin (LIPITOR) 40 mg tablet   No No   Sig: TAKE ONE TABLET BY MOUTH ONCE DAILY   baclofen 10 mg tablet   No No   Sig: TAKE ONE TABLET BY MOUTH THREE TIMES DAILY AS NEEDED FOR MUSCLE SPASM   cholestyramine (QUESTRAN) 4 g packet   No No   Sig: Take 1 packet (4 g total) by mouth 3 (three) times a day with meals   Patient not taking: Reported on 3/8/2023   escitalopram (Lexapro) 20 mg tablet  Self No No   Sig: Take 0.5 tablets (10 mg total) by mouth daily   Patient taking differently: Take 5 mg by mouth daily after dinner   gabapentin (NEURONTIN) 300 mg capsule   No No   Sig: Take 1 capsule (300 mg total) by mouth 3 (three) times a day   Patient not taking: Reported on 8/26/2023   glucose blood (OneTouch Verio) test strip   No No   Sig: Check blood sugars twice daily. Please substitute with appropriate alternative as covered by patient's insurance.  Dx: E11.65   hyoscyamine (ANASPAZ,LEVSIN) 0.125 MG tablet   No No   Sig: Take 1 tablet (0.125 mg total) by mouth every 4 (four) hours as needed for cramping   latanoprost (XALATAN) 0.005 % ophthalmic solution  Self No No   Sig: Administer 1 drop to both eyes daily at bedtime   levothyroxine 25 mcg tablet  Self No No   Sig: Take 1.5 tablets (37.5 mcg total) by mouth daily   lisinopril (ZESTRIL) 20 mg tablet   No No   Sig: TAKE ONE TABLET BY MOUTH ONCE DAILY   meclizine (ANTIVERT) 25 mg tablet   No No   Sig: Take 1 tablet (25 mg total) by mouth 4 (four) times a day as needed for dizziness   metFORMIN (GLUCOPHAGE) 1000 MG tablet   No No   Sig: Take 1 tablet (1,000 mg total) by mouth 2 (two) times a day with meals   metoclopramide (REGLAN) 10 mg tablet   No No   Sig: Take 1 tablet (10 mg total) by mouth 4 (four) times a day as needed (Nausea and vomiting)   metoprolol succinate (TOPROL-XL) 25 mg 24 hr tablet   Yes No   Sig: Take 25 mg by mouth daily   nystatin (MYCOSTATIN) powder  Self No No   Sig: Apply topically 2 (two) times a day   Patient not taking: Reported on 8/26/2023   oxyCODONE-acetaminophen (PERCOCET)  mg per tablet   No No   Sig: Take 1 tablet by mouth every 6 (six) hours as needed for severe pain Max Daily Amount: 4 tablets   pantoprazole (PROTONIX) 40 mg tablet  Self No No   Sig: Take 1 tablet (40 mg total) by mouth every 12 (twelve) hours   saccharomyces boulardii (FLORASTOR) 250 mg capsule  Self No No   Sig: Take 1 capsule (250 mg total) by mouth 2 (two) times a day   trospium (SANCTURA XR) 60 mg 24 hr capsule   No No   Sig: Take 1 capsule (60 mg total) by mouth daily before breakfast      Facility-Administered Medications: None       Past Medical History:   Diagnosis Date    Acid reflux     Anemia     hx of iron-deficient    Anxiety     Arthritis     Asthma     last needed inhaler last year 2020    Basal cell carcinoma     upper lip    Chronic narcotic dependence (HCC)     Chronic pain     Colon polyp     Cystocele     Diabetes mellitus ([address])     stable    Disease of thyroid gland     hypothyroidism    Diverticulosis     Dizziness     at times    Dysfunctional uterine bleeding     last assessed - [id number]    Dysphagia     Fibromyalgia     Gastric ulcer     Gastroparesis     History of colonic polyps     last assessed - [id number]    History of gastroesophageal reflux (GERD)     Hypercholesterolemia     Hyperlipidemia     Hypertension     IBS (irritable bowel syndrome)     Pneumobilia 06/18/2022    Post laminectomy syndrome     S/P insertion of spinal cord stimulator 07/18/2018    Seasonal allergies     Shortness of breath     exertional    Spinal stenosis     Status post lumbar spinal fusion 03/16/2018    Stroke [facility])     pt states slight stroke March 2022       Past Surgical History:   Procedure Laterality Date    APPENDECTOMY      BACK SURGERY      BREAST CYST EXCISION Left     CHOLECYSTECTOMY      COLONOSCOPY      ESOPHAGOGASTRODUODENOSCOPY N/A 09/28/2016    Procedure: ESOPHAGOGASTRODUODENOSCOPY (EGD); Surgeon: Leonidas Rabago MD;  [facility];   Service:     HERNIA REPAIR      HYSTERECTOMY      TTAH-BSO age 25    LAMINECTOMY      LUMBAR LAMINECTOMY      OOPHORECTOMY      age 25    CT ARTHRODESIS POSTERIOR/PSTLAT TQ 1NTRSPC THORACIC N/A 06/04/2018 Procedure: Reopening of lumbar incision for T12-L5 posterior instrumented fixation and fusion and T12-L4 posterior decompression;  Surgeon: Becky Samuel MD;  [facility];  Service: Neurosurgery    NJ COLONOSCOPY FLX DX W/COLLJ SPEC WHEN PFRMD N/A 2016    Procedure: EGD AND COLONOSCOPY;  Surgeon: Sheryl Guerrero MD;  [facility]: [facility]; Service: Gastroenterology    NJ DILATION [address] [facility] UNGUIDED SOUND/BOUGIE 1/MULT PASS N/A 2016    Procedure: DILATATION ESOPHAGEAL;  Surgeon: Sheryl Guerrero MD;  [facility]: [facility]; Service: Gastroenterology    NJ ESOPHAGOGASTRODUODENOSCOPY TRANSORAL DIAGNOSTIC N/A 2016    Procedure: ESOPHAGOGASTRODUODENOSCOPY (EGD); Surgeon: Sheryl Guerrero MD;  [facility]; Service: Gastroenterology    NJ INSJ/RPLCMT SPI NPGR DIR/INDUXIVE COUPLING Left 2018    Procedure: removal of left buttock implantable pulse generator and placement of new  implantable pulse generator;  Surgeon: Becky Samuel MD;  [facility];  Service: Neurosurgery       Family History   Problem Relation Age of Onset    Lung cancer Mother 55    Pulmonary embolism Father     No Known Problems Sister     No Known Problems Daughter     No Known Problems Daughter     Stroke Maternal Grandmother     Heart attack Maternal Grandfather     No Known Problems Paternal Grandmother     No Known Problems Paternal Grandfather     No Known Problems Maternal Aunt     Diabetes Family         Diabetes mellitus    Hypertension Family     Stroke Family         Stroke complications     I have reviewed and agree with the history as documented.     E-Cigarette/Vaping    E-Cigarette Use Never User      E-Cigarette/Vaping Substances    Nicotine No     THC No     CBD No     Flavoring No     Other No     Unknown No      Social History     Tobacco Use    Smoking status: Former     Types: Cigarettes     Quit date: 1970     Years since quittin.8    Smokeless tobacco: Never    Tobacco comments: Denied history of current ever day smoker, Former smoker and Never smoker all documented in [employer]   Vaping Use    Vaping Use: Never used   Substance Use Topics    Alcohol use: Not Currently     Comment: Denied history of alcohol use    Drug use: No     Comment: Denied history of drug use       Review of Systems   Constitutional:  Negative for chills and fever. HENT:  Negative for congestion. Eyes:  Negative for visual disturbance. Respiratory:  Positive for shortness of breath (with exertion). Negative for cough. Cardiovascular:  Positive for chest pain (with exertion). Negative for leg swelling. Gastrointestinal:  Positive for constipation. Negative for abdominal pain, diarrhea, nausea and vomiting. Genitourinary:  Negative for dysuria and frequency. Musculoskeletal:  Negative for arthralgias, back pain, neck pain and neck stiffness. Skin:  Negative for rash. Neurological:  Negative for weakness, numbness and headaches. Psychiatric/Behavioral:  Negative for agitation, behavioral problems and confusion. Physical Exam  Physical Exam  Vitals and nursing note reviewed. Constitutional:       General: She is not in acute distress. Appearance: She is well-developed. She is not diaphoretic. HENT:      Head: Normocephalic and atraumatic. Right Ear: External ear normal.      Left Ear: External ear normal.      Nose: Nose normal.   Eyes:      Conjunctiva/sclera: Conjunctivae normal.   Cardiovascular:      Rate and Rhythm: Regular rhythm. Tachycardia present. Pulses: Normal pulses. Heart sounds: Normal heart sounds. No murmur heard. No friction rub. No gallop. Pulmonary:      Effort: Pulmonary effort is normal. No respiratory distress. Breath sounds: Normal breath sounds. No wheezing or rales. Abdominal:      General: Bowel sounds are normal. There is no distension ("soreness" to deep palpation of the abdomen diffusely, exam overall benign).       Palpations: Abdomen is soft. Tenderness: There is no abdominal tenderness. There is no guarding. Musculoskeletal:         General: No deformity. Normal range of motion. Cervical back: Normal range of motion and neck supple. Right lower leg: No edema. Left lower leg: No edema. Comments: No calf tenderness   Skin:     General: Skin is warm and dry. Comments: 2+ pedal pulses bilaterally but feet are notably cold with some mottling of the skin   Neurological:      Mental Status: She is alert and oriented to person, place, and time. Motor: No abnormal muscle tone.    Psychiatric:         Mood and Affect: Mood normal.         Vital Signs  ED Triage Vitals   Temperature Pulse Respirations Blood Pressure SpO2   11/10/23 1233 11/10/23 1233 11/10/23 1234 11/10/23 1233 11/10/23 1233   97.5 °F (36.4 °C) 100 17 135/72 95 %      Temp src Heart Rate Source Patient Position - Orthostatic VS BP Location FiO2 (%)   -- -- -- -- --             Pain Score       --                  Vitals:    11/10/23 1233 11/10/23 1300 11/10/23 1332   BP: 135/72 131/70 133/71   Pulse: 100 100 101         Visual Acuity      ED Medications  Medications   heparin (porcine) injection 5,200 Units (has no administration in time range)   heparin (porcine) 25,000 units in 0.45% NaCl 250 mL infusion (premix) (has no administration in time range)   heparin (porcine) injection 5,200 Units (has no administration in time range)   heparin (porcine) injection 2,600 Units (has no administration in time range)       Diagnostic Studies  Results Reviewed       Procedure Component Value Units Date/Time    APTT [[id number]]  (Normal) Collected: 11/10/23 1332    Lab Status: Final result Specimen: Blood from Arm, Left Updated: 11/10/23 1606     PTT 28 seconds     Protime-INR [[id number]]  (Normal) Collected: 11/10/23 1332    Lab Status: Final result Specimen: Blood from Arm, Left Updated: 11/10/23 1606     Protime 12.7 seconds      INR 0.90    HS Troponin I 2hr [[id number]] Collected: 11/10/23 1546    Lab Status: In process Specimen: Blood from Line, Venous Updated: 11/10/23 1550    HS Troponin I 4hr [[id number]]     Lab Status: No result Specimen: Blood     B-Type Natriuretic Peptide(BNP) [[id number]]  (Normal) Collected: 11/10/23 1332    Lab Status: Final result Specimen: Blood from Arm, Left Updated: 11/10/23 1417     BNP 25 pg/mL     HS Troponin 0hr (reflex protocol) [[id number]]  (Normal) Collected: 11/10/23 1332    Lab Status: Final result Specimen: Blood from Arm, Left Updated: 11/10/23 1416     hs TnI 0hr 10 ng/L     Comprehensive metabolic panel [[id number]]  (Abnormal) Collected: 11/10/23 1332    Lab Status: Final result Specimen: Blood from Arm, Left Updated: 11/10/23 1408     Sodium 136 mmol/L      Potassium 4.6 mmol/L      Chloride 101 mmol/L      CO2 25 mmol/L      ANION GAP 10 mmol/L      BUN 33 mg/dL      Creatinine 1.48 mg/dL      Glucose 169 mg/dL      Calcium 9.3 mg/dL      AST 14 U/L      ALT 8 U/L      Alkaline Phosphatase 84 U/L      Total Protein 6.4 g/dL      Albumin 3.7 g/dL      Total Bilirubin 0.28 mg/dL      eGFR 33 ml/min/1.73sq m     Narrative:      [facility] guidelines for Chronic Kidney Disease (CKD):     Stage 1 with normal or high GFR (GFR > 90 mL/min/1.73 square meters)    Stage 2 Mild CKD (GFR = 60-89 mL/min/1.73 square meters)    Stage 3A Moderate CKD (GFR = 45-59 mL/min/1.73 square meters)    Stage 3B Moderate CKD (GFR = 30-44 mL/min/1.73 square meters)    Stage 4 Severe CKD (GFR = 15-29 mL/min/1.73 square meters)    Stage 5 End Stage CKD (GFR <15 mL/min/1.73 square meters)  Note: GFR calculation is accurate only with a steady state creatinine    D-dimer, quantitative [[id number]]  (Abnormal) Collected: 11/10/23 1332    Lab Status: Final result Specimen: Blood from Arm, Left Updated: 11/10/23 1407     D-Dimer, Quant 0.86 ug/ml FEU     Narrative:       In the evaluation for possible pulmonary embolism, in the appropriate (Well's Score of 4 or less) patient, the age adjusted d-dimer cutoff for this patient can be calculated as:    Age x 0.01 (in ug/mL) for Age-adjusted D-dimer exclusion threshold for a patient over 50 years. CBC and differential [[id number]]  (Abnormal) Collected: 11/10/23 1332    Lab Status: Final result Specimen: Blood from Arm, Left Updated: 11/10/23 1351     WBC 9.55 Thousand/uL      RBC 3.65 Million/uL      Hemoglobin 10.4 g/dL      Hematocrit 33.6 %      MCV 92 fL      MCH 28.5 pg      MCHC 31.0 g/dL      RDW 14.6 %      MPV 11.0 fL      Platelets 466 Thousands/uL      nRBC 0 /100 WBCs      Neutrophils Relative 76 %      Immat GRANS % 0 %      Lymphocytes Relative 13 %      Monocytes Relative 7 %      Eosinophils Relative 3 %      Basophils Relative 1 %      Neutrophils Absolute 7.29 Thousands/µL      Immature Grans Absolute 0.03 Thousand/uL      Lymphocytes Absolute 1.24 Thousands/µL      Monocytes Absolute 0.63 Thousand/µL      Eosinophils Absolute 0.30 Thousand/µL      Basophils Absolute 0.06 Thousands/µL                    XR chest 2 views   ED Interpretation by Mike Carlton MD (11/10 1435)   CXR with no acute cardiopulmonary abnormalities. Procedures  Procedures         ED Course  ED Course as of 11/10/23 1609   Fri Nov 10, 2023   1326 I personally interpreted the patient's EKG which reveals normal rate, normal sinus rhythm, normal axis, nonspecific intraventricular conduction block with QRS duration of 126 seconds similar to prior, new T wave inversion in lead III compared to most recent prior EKG with subtle less than 1 mm of ST elevation in lead aVR. 1435 Patient is satting well on room air with heart rate at around 100 bpm when sitting in bed. Standing up for chest x-ray, however, she became hypoxic with SPO2 in the high 80s and tachycardic to the 130s reported onset of chest pressure. Initial troponin is 10, will obtain delta in 2 hours. BNP 25.   D-dimer is mildly elevated to 0.86. Unfortunately, patient is allergic to IV contrast dye and GFR is less than 35 so cannot obtain CTA of the chest.  Plan to start heparin drip and admit for VQ scan. 1436 Patient has an SADAF, with creatinine of 1.48 from 0.92.    1520 I personally interpreted the patient's EKG which reveals sinus tachycardia to 106 bpm, normal axis, normal intervals, 1 mm of ST depression in lead I new from prior, continued T wave inversion in lead III, biphasic T wave in lead V1.   1544 Had a risk-benefit conversation with the patient and her physician daughter as well as other family members at bedside regarding starting empiric heparin. We discussed risk of internal bleeding including GI bleed. Also discussed that the patient does have some EKG changes and is having intermittent exertional chest pressure with elevated D-dimer and hypoxia with episodes of ambulation. At this point I am still concerned for the possibility of coronary artery disease and PE so would recommend starting empiric heparin. They are comfortable with this plan and agreeable to starting heparin and admission to the hospital.   1609 Patient reports feeling bloated with some abdominal discomfort that she suspects is due to constipation his last bowel movement was 1 week ago. Reports some "soreness" with generalized palpation of the abdomen but abdomen is overall benign. She is passing flatus, not vomiting, low suspicion for intra-abdominal surgical pathology such as bowel obstruction at this time. Medical Decision Making  Please see ED course above for details of the Medical Decision Making. Amount and/or Complexity of Data Reviewed  Labs: ordered. Radiology: ordered and independent interpretation performed. ECG/medicine tests: ordered and independent interpretation performed. Decision-making details documented in ED Course. Risk  Prescription drug management.   Decision regarding hospitalization. Disposition  Final diagnoses:   Shortness of breath   Chest pain   Abnormal EKG   SADAF (acute kidney injury) ([address])     Time reflects when diagnosis was documented in both MDM as applicable and the Disposition within this note       Time User Action Codes Description Comment    11/10/2023  4:01 PM Iron Gil Add [R06.02] Shortness of breath     11/10/2023  4:01 PM Iron Gil Add [R07.9] Chest pain     11/10/2023  4:01 PM [facility] Gil Add [R94.31] Abnormal EKG     11/10/2023  4:01 PM Iron Gil Add [N17.9] SADAF (acute kidney injury) [facility])           ED Disposition       ED Disposition   Admit    Condition   Stable    Date/Time   Fri Nov 10, 2023  4:01 PM    Comment   Case was discussed with GREG and the patient's admission status was agreed to be Admission Status: inpatient status to the service of Dr. Lake Davalso . Follow-up Information    None         Patient's Medications   Discharge Prescriptions    No medications on file       No discharge procedures on file.     PDMP Review         Value Time User    PDMP Reviewed  Yes 8/26/2023  9:34 PM Gregory Ambriz MD            ED Provider  Electronically Signed by             Chayo Arteaga MD  11/10/23 591 [facility] Paola Casas MD  11/10/23 [id number]

## 2023-11-10 NOTE — ASSESSMENT & PLAN NOTE
Hb 10.4, within baseline on 10-11, MCV normal. Likely anemia of chronic disease vs mixed anemia (micro+macro with averaging to normal MCV), no recent iron panel, B12, folate.  Prior iron and ferritin low 3 years ago    Plan  Order iron panel, B12, folate

## 2023-11-10 NOTE — PLAN OF CARE
Problem: Potential for Falls  Goal: Patient will remain free of falls  Description: INTERVENTIONS:  - Educate patient/family on patient safety including physical limitations  - Instruct patient to call for assistance with activity   - Consult OT/PT to assist with strengthening/mobility   - Keep Call bell within reach  - Keep bed low and locked with side rails adjusted as appropriate  - Keep care items and personal belongings within reach  - Initiate and maintain comfort rounds  - Make Fall Risk Sign visible to staff  - Offer Toileting every 2 Hours, in advance of need  - Initiate/Maintain alarm  - Obtain necessary fall risk management equipment:   - Apply yellow socks and bracelet for high fall risk patients  - Consider moving patient to room near nurses station  Outcome: Progressing     Problem: MOBILITY - ADULT  Goal: Maintain or return to baseline ADL function  Description: INTERVENTIONS:  -  Assess patient's ability to carry out ADLs; assess patient's baseline for ADL function and identify physical deficits which impact ability to perform ADLs (bathing, care of mouth/teeth, toileting, grooming, dressing, etc.)  - Assess/evaluate cause of self-care deficits   - Assess range of motion  - Assess patient's mobility; develop plan if impaired  - Assess patient's need for assistive devices and provide as appropriate  - Encourage maximum independence but intervene and supervise when necessary  - Involve family in performance of ADLs  - Assess for home care needs following discharge   - Consider OT consult to assist with ADL evaluation and planning for discharge  - Provide patient education as appropriate  Outcome: Progressing  Goal: Maintains/Returns to pre admission functional level  Description: INTERVENTIONS:  - Perform BMAT or MOVE assessment daily.   - Set and communicate daily mobility goal to care team and patient/family/caregiver.    - Collaborate with rehabilitation services on mobility goals if consulted  - Perform Range of Motion 2 times a day. - Reposition patient every 2 hours.   - Dangle patient 2 times a day  - Stand patient 2 times a day  - Ambulate patient 2 times a day  - Out of bed to chair 2 times a day   - Out of bed for meals 2 times a day  - Out of bed for toileting  - Record patient progress and toleration of activity level   Outcome: Progressing

## 2023-11-10 NOTE — ASSESSMENT & PLAN NOTE
Prior thoracic and lumbar surgery, prior laminectomy, spinal cord stimulator in place (non-functional per daughter)    Plan  Continue PTA Percocet 10/325 mg Q12 prn  Continue PTA Savella  Continue PTA gabapentin 300 mg hs and baclofen prn

## 2023-11-10 NOTE — ASSESSMENT & PLAN NOTE
Lab Results   Component Value Date    HGBA1C 6.6 (H) 06/09/2023       No results for input(s): "POCGLU" in the last 72 hours. Blood Sugar Average: Last 72 hrs:    Well-controlled.  Hold PTA metformin, initiate sliding scale

## 2023-11-10 NOTE — H&P
[facility] [facility]  H&P  Name: Arnulfo Gutierrez 78 y.o. female I MRN: [id number]  Unit/Bed#: [facility] I Date of Admission: 11/10/2023   Date of Service: 11/10/2023 I Hospital Day: 0      Assessment/Plan   * Shortness of breath  Assessment & Plan  Patient presents from PCP office with chest pressure, SOB, lightheadedness and hypoxia with exertion, and tachycardia  At rest in the ED O2Sat mid 90s on RA. Desat to high 80s with ambulation and tachycardia  EKG sinus tachycardia with T wave inversion in lead III, prolonged Qtc  Last echo 2/23 showing 65% EF and normal systolic function. No aortic stenosis  CXR normal per my read. History of tobacco use, quit 40 years ago  History of falls in the past 3 months with unknown etiology. Seen by ENT in 9/23 - likely medication induced vs vascular issue. Vestibular therapy recommended  Elevated D-dimer 0.86. Troponin negative, BNP 25  SADAF with Cr 1.48 (baseline 0.8-1)    Plan  Order V/Q scan for PE rule out. If kidney function normalizes tomorrow with fluids and PO intake, switch to CTA PE with steroid and Benadryl pre-treatment. Has contrast allergy (hives)  Heparin gtt started  Repeat TTE  Ambulatory pulse ox  Monitor on telemetry  May need stress test outpatient for CAD workup if PE ruled out    Acute kidney injury [facility])  Assessment & Plan  POA Cr 1.48 with baseline 0.8-1. Possible due to decreased PO intake per daughter report    Plan  Isolyte 75 mL/hr x10 hours  Encourage PO intake  Hold PTA lisinopril in setting of SADAF  Repeat BMP. Replete electrolytes prn    Ambulatory dysfunction  Assessment & Plan  Ambulates with walker at home.  Frequent falls recently with admission on 8/26    Plan  PT/OT eval and treat  Orthostatic vital signs  Continue meclizine prn for dizziness    Anemia  Assessment & Plan  Hb 10.4, within baseline on 10-11, MCV normal. Likely anemia of chronic disease vs mixed anemia (micro+macro with averaging to normal MCV), no recent iron panel, B12, folate. Prior iron and ferritin low 3 years ago    Plan  Order iron panel, B12, folate    Type 2 diabetes mellitus (HCC)  Assessment & Plan  Lab Results   Component Value Date    HGBA1C 6.6 (H) 06/09/2023       No results for input(s): "POCGLU" in the last 72 hours. Blood Sugar Average: Last 72 hrs:    Well-controlled. Hold PTA metformin, initiate sliding scale    History of lumbar surgery  Assessment & Plan  Prior thoracic and lumbar surgery, prior laminectomy, spinal cord stimulator in place (non-functional per daughter)    Plan  Continue PTA Percocet 10/325 mg Q12 prn  Continue PTA Savella  Continue PTA gabapentin 300 mg hs and baclofen prn    Hypertension  Assessment & Plan  Continue PTA metoprolol  Hold lisinopril in setting of SADAF    Overactive bladder  Assessment & Plan  Start oxybutynin, replacement for home trospium per formulary    Hypothyroidism  Assessment & Plan  Continue PTA levothyroxine    Glaucoma  Assessment & Plan  Continue PTA latanoprost         VTE Pharmacologic Prophylaxis: VTE Score: 6 High Risk (Score >/= 5) - Pharmacological DVT Prophylaxis Ordered: enoxaparin (Lovenox). Sequential Compression Devices Ordered. Code Status: Level 1 - Full Code Per patient  Discussion with family: Updated  (daughter) at bedside. Anticipated Length of Stay: Patient will be admitted on an inpatient basis with an anticipated length of stay of greater than 2 midnights secondary to SOB. Chief Complaint: SOB with exertion, tachycardia, lightheadedness, chest pressure    History of Present Illness: Virgilio Mendez is a 78 y.o. female with a PMH of HTN, HLD, T2DM, OAB, hypothyroidism, prior lumbar and thoracic surgery, chronic pain who presents from PCP office due to hypoxia SpO2 in 70s, HR 160s with ambulation, chest pressure, palpitations, lightheadedness.  In the ED, O2sat in 90s at rest, but desat to high 80s with ambulation and tachycardia in 130s, with recurrence of similar symptoms. Not on home O2. No history of COPD, despite chart review diagnoses. Per patient and daughter report, symptoms have recurred for the past few months, more frequent recently, along with falls and worsening ambulatory function. No recent illness, sick contacts, flu-like symptoms, N/V/D, dysuria. Has chronic pain from prior spine surgeries and constipation with BM every 4 days. Some blood streaks noted on toilet paper related to hemorrhoids, but no bright red blood per rectum. EKG with sinus tachycardia and T wave inversion in lead III and prolonged Qtc, LBBB. D-dimer elevated at 0.86. CXR normal per my read. Cr 1.48 from baseline 0.8-1. Troponin 10--12, BNP 25. Last A1c 6.6 in Spring 2023. Last echo 2/23 with 65% EF and normal systolic function. Patient is being admitted for PE rule-out and workup of SOB, SADAF, ambulatory dysfunction. Review of Systems:  Review of Systems   Constitutional:  Negative for chills and fever. HENT:  Negative for sore throat. Respiratory:  Positive for shortness of breath (with ambulation only). Negative for cough and wheezing. Chest pressure with ambulation   Cardiovascular:  Positive for palpitations (with ambulation only). Gastrointestinal:  Negative for blood in stool (hemorrhoids), nausea and vomiting. Genitourinary:  Negative for dysuria and urgency. Musculoskeletal:  Positive for back pain (chronic). Negative for myalgias. Neurological:  Positive for dizziness (chronic) and light-headedness (with ambulation only). Negative for headaches. Psychiatric/Behavioral:  Negative for agitation and confusion. The patient is not nervous/anxious.         Past Medical and Surgical History:   Past Medical History:   Diagnosis Date    Acid reflux     Anemia     hx of iron-deficient    Anxiety     Arthritis     Asthma     last needed inhaler last year 2020    Basal cell carcinoma     upper lip    Chronic narcotic dependence (HCC)     Chronic pain     Colon polyp     Cystocele     Diabetes mellitus ([address])     stable    Disease of thyroid gland     hypothyroidism    Diverticulosis     Dizziness     at times    Dysfunctional uterine bleeding     last assessed - [id number]    Dysphagia     Fibromyalgia     Gastric ulcer     Gastroparesis     History of colonic polyps     last assessed - [id number]    History of gastroesophageal reflux (GERD)     Hypercholesterolemia     Hyperlipidemia     Hypertension     IBS (irritable bowel syndrome)     Pneumobilia 06/18/2022    Post laminectomy syndrome     S/P insertion of spinal cord stimulator 07/18/2018    Seasonal allergies     Shortness of breath     exertional    Spinal stenosis     Status post lumbar spinal fusion 03/16/2018    Stroke [facility])     pt states slight stroke March 2022       Past Surgical History:   Procedure Laterality Date    APPENDECTOMY      BACK SURGERY      BREAST CYST EXCISION Left     CHOLECYSTECTOMY      COLONOSCOPY      ESOPHAGOGASTRODUODENOSCOPY N/A 09/28/2016    Procedure: ESOPHAGOGASTRODUODENOSCOPY (EGD); Surgeon: Carleen Mir MD;  [facility]; Service:     HERNIA REPAIR      HYSTERECTOMY      TTAH-BSO age 25    LAMINECTOMY      LUMBAR LAMINECTOMY      OOPHORECTOMY      age 25    AK ARTHRODESIS POSTERIOR/PSTLAT TQ 1NTRSPC THORACIC N/A 06/04/2018    Procedure: Reopening of lumbar incision for T12-L5 posterior instrumented fixation and fusion and T12-L4 posterior decompression;  Surgeon: Sameera Valle MD;  [facility]: [facility];  Service: Neurosurgery    AK COLONOSCOPY FLX DX W/COLLJ SPEC WHEN PFRMD N/A 03/02/2016    Procedure: EGD AND COLONOSCOPY;  Surgeon: Carleen Mir MD;  [facility]: [facility]; Service: Gastroenterology    AK DILATION [address] [facility] UNGUIDED SOUND/BOUGIE 1/MULT PASS N/A 09/28/2016    Procedure: DILATATION ESOPHAGEAL;  Surgeon: Carleen Mir MD;  [facility];   Service: Gastroenterology    AK ESOPHAGOGASTRODUODENOSCOPY TRANSORAL DIAGNOSTIC N/A 07/18/2016    Procedure: ESOPHAGOGASTRODUODENOSCOPY (EGD); Surgeon: Hayde Bermudez MD;  [facility]; Service: Gastroenterology    TX INSJ/RPLCMT SPI NPGR DIR/INDUXIVE COUPLING Left 06/04/2018    Procedure: removal of left buttock implantable pulse generator and placement of new  implantable pulse generator;  Surgeon: Nomi Polo MD;  [facility];  Service: Neurosurgery       Meds/Allergies:  Prior to Admission medications    Medication Sig Start Date End Date Taking?  Authorizing Provider   albuterol (PROVENTIL HFA,VENTOLIN HFA) 90 mcg/act inhaler Inhale 2 puffs every 6 (six) hours as needed for wheezing or shortness of breath 6/29/22  Yes NANY Solares   atorvastatin (LIPITOR) 40 mg tablet TAKE ONE TABLET BY MOUTH ONCE DAILY 11/22/22  Yes NANY Solares   gabapentin (NEURONTIN) 300 mg capsule Take 1 capsule (300 mg total) by mouth 3 (three) times a day  Patient taking differently: Take 300 mg by mouth daily at bedtime 10/24/22  Yes NANY Solares   latanoprost (XALATAN) 0.005 % ophthalmic solution Administer 1 drop to both eyes daily at bedtime 10/2/20 11/10/23 Yes Venice Baires,    levothyroxine 25 mcg tablet Take 1.5 tablets (37.5 mcg total) by mouth daily 8/9/22 11/10/23 Yes NANY Solares   lisinopril (ZESTRIL) 20 mg tablet TAKE ONE TABLET BY MOUTH ONCE DAILY 5/30/23  Yes NANY Solares   metFORMIN (GLUCOPHAGE) 1000 MG tablet Take 1 tablet (1,000 mg total) by mouth 2 (two) times a day with meals 2/10/23  Yes NANY Solares   metoprolol succinate (TOPROL-XL) 25 mg 24 hr tablet Take 25 mg by mouth daily   Yes Historical Provider, MD   Milnacipran HCl (Savella) 100 MG TABS Take 1 tablet (100 mg total) by mouth daily 3/5/23  Yes Alayna Baires,    oxyCODONE-acetaminophen (PERCOCET)  mg per tablet Take 1 tablet by mouth every 6 (six) hours as needed for severe pain Max Daily Amount: 4 tablets  Patient taking differently: Take 1 tablet by mouth every 12 (twelve) hours as needed for severe pain 5/24/23  Yes NANY Shafer   pantoprazole (PROTONIX) 40 mg tablet Take 1 tablet (40 mg total) by mouth every 12 (twelve) hours  Patient taking differently: Take 40 mg by mouth daily 7/18/22 11/10/23 Yes Darwin Vallejo MD   trospium (SANCTURA XR) 60 mg 24 hr capsule Take 1 capsule (60 mg total) by mouth daily before breakfast 4/3/23  Yes NANY Shafer   aspirin 81 mg chewable tablet Chew 1 tablet (81 mg total) daily 8/9/22 8/26/23  NANY Shafer   baclofen 10 mg tablet TAKE ONE TABLET BY MOUTH THREE TIMES DAILY AS NEEDED FOR MUSCLE SPASM 5/15/23   NANY Shafer   Blood Glucose Monitoring Suppl (OneTouch Verio Reflect) w/Device KIT Check blood sugars twice daily. Please substitute with appropriate alternative as covered by patient's insurance. Dx: E11.65 2/20/23   NANY Shafer   cholestyramine Florrie Hakim) 4 g packet Take 1 packet (4 g total) by mouth 3 (three) times a day with meals  Patient not taking: Reported on 11/10/2023 10/27/22   Martha Baires DO   escitalopram (Lexapro) 20 mg tablet Take 0.5 tablets (10 mg total) by mouth daily  Patient not taking: Reported on 11/10/2023 6/29/22   NANY Shafer   glucose blood (OneTouch Verio) test strip Check blood sugars twice daily. Please substitute with appropriate alternative as covered by patient's insurance.  Dx: E11.65 2/20/23   NANY Shafer   hyoscyamine (ANASPAZ,LEVSIN) 0.125 MG tablet Take 1 tablet (0.125 mg total) by mouth every 4 (four) hours as needed for cramping  Patient not taking: Reported on 11/10/2023 5/26/23   NANY Shafer   meclizine (ANTIVERT) 25 mg tablet Take 1 tablet (25 mg total) by mouth 4 (four) times a day as needed for dizziness 3/26/23 8/26/23  Martha Baires DO   metoclopramide (REGLAN) 10 mg tablet Take 1 tablet (10 mg total) by mouth 4 (four) times a day as needed (Nausea and vomiting)  Patient not taking: Reported on 11/10/2023 9/9/22   NANY Shafer   nystatin (MYCOSTATIN) powder Apply topically 2 (two) times a day  Patient not taking: Reported on 2023   Yuni Birmingham MD   OneTouch Delica Lancets 55M MISC Check blood sugars twice daily. Please substitute with appropriate alternative as covered by patient's insurance. Dx: E11.65 23   NANY Shafer   saccharomyces boulardii (FLORASTOR) 250 mg capsule Take 1 capsule (250 mg total) by mouth 2 (two) times a day  Patient not taking: Reported on 11/10/2023 6/21/22   Yuni Birmingham MD   sucralfate (CARAFATE) 1 g tablet Take 1 tablet (1 g total) by mouth 4 (four) times a day 7/18/22 10/13/22  Darwin Vallejo MD     I have reviewed home medications with patient family member. Allergies: Allergies   Allergen Reactions    Penicillins Anaphylaxis, Hives and Other (See Comments)     Other reaction(s): Unknown Reaction    Sulfa Antibiotics Anaphylaxis and Other (See Comments)     Other reaction(s): Unknown Reaction    Aspartame - Food Allergy Other (See Comments) and Hypertension     Slurred speech, weakness, stroke sx    Iodinated Contrast Media Hives     Pt has taken prep prior for contrast and has not had any break through reaction    Keflex [Cephalexin] Hives       Social History:  Marital Status:     Occupation: Retired  Patient Pre-hospital Living Situation: Home  Patient Pre-hospital Level of Mobility: walks with walker  Patient Pre-hospital Diet Restrictions: None  Substance Use History:   Social History     Substance and Sexual Activity   Alcohol Use Not Currently    Comment: Denied history of alcohol use     Social History     Tobacco Use   Smoking Status Former    Types: Cigarettes    Quit date: 1970    Years since quittin.8   Smokeless Tobacco Never   Tobacco Comments    Denied history of current ever day smoker, Former smoker and Never smoker all documented in [employer]     Social History     Substance and Sexual Activity Drug Use No    Comment: Denied history of drug use       Family History:  Family History   Problem Relation Age of Onset    Lung cancer Mother 55    Pulmonary embolism Father     No Known Problems Sister     No Known Problems Daughter     No Known Problems Daughter     Stroke Maternal Grandmother     Heart attack Maternal Grandfather     No Known Problems Paternal Grandmother     No Known Problems Paternal Grandfather     No Known Problems Maternal Aunt     Diabetes Family         Diabetes mellitus    Hypertension Family     Stroke Family         Stroke complications       Physical Exam:     Vitals:   Blood Pressure: 137/71 (11/10/23 1745)  Pulse: (!) 112 (11/10/23 1745)  Temperature: 97.5 °F (36.4 °C) (11/10/23 1233)  Respirations: 17 (11/10/23 1234)  SpO2: 97 % (11/10/23 1745)    Physical Exam  Constitutional:       General: She is not in acute distress. Appearance: Normal appearance. She is not ill-appearing or toxic-appearing. HENT:      Head: Normocephalic and atraumatic. Mouth/Throat:      Mouth: Mucous membranes are moist.      Pharynx: No oropharyngeal exudate. Eyes:      General: No scleral icterus. Conjunctiva/sclera: Conjunctivae normal.   Cardiovascular:      Rate and Rhythm: Regular rhythm. Tachycardia present. Pulses: Normal pulses. Heart sounds: Murmur (1/6 systolic ejection murmur) heard. Pulmonary:      Effort: Pulmonary effort is normal. No respiratory distress. Breath sounds: Normal breath sounds. No wheezing, rhonchi or rales. Abdominal:      General: Bowel sounds are normal. There is no distension. Palpations: Abdomen is soft. Tenderness: There is no abdominal tenderness. Musculoskeletal:      Cervical back: Neck supple. No tenderness. Right lower leg: No edema. Left lower leg: No edema. Lymphadenopathy:      Cervical: No cervical adenopathy. Skin:     General: Skin is warm.       Capillary Refill: Capillary refill takes less than 2 seconds. Coloration: Skin is not jaundiced. Neurological:      General: No focal deficit present. Mental Status: She is alert and oriented to person, place, and time. Mental status is at baseline. Psychiatric:         Mood and Affect: Mood normal.         Behavior: Behavior normal.          Additional Data:     Lab Results:  Results from last 7 days   Lab Units 11/10/23  1332   WBC Thousand/uL 9.55   HEMOGLOBIN g/dL 10.4*   HEMATOCRIT % 33.6*   PLATELETS Thousands/uL 263   NEUTROS PCT % 76*   LYMPHS PCT % 13*   MONOS PCT % 7   EOS PCT % 3     Results from last 7 days   Lab Units 11/10/23  1332   SODIUM mmol/L 136   POTASSIUM mmol/L 4.6   CHLORIDE mmol/L 101   CO2 mmol/L 25   BUN mg/dL 33*   CREATININE mg/dL 1.48*   ANION GAP mmol/L 10   CALCIUM mg/dL 9.3   ALBUMIN g/dL 3.7   TOTAL BILIRUBIN mg/dL 0.28   ALK PHOS U/L 84   ALT U/L 8   AST U/L 14   GLUCOSE RANDOM mg/dL 169*     Results from last 7 days   Lab Units 11/10/23  1332   INR  0.90     Results from last 7 days   Lab Units 11/10/23  1749   POC GLUCOSE mg/dl 142*               Lines/Drains:  Invasive Devices       Peripheral Intravenous Line  Duration             Peripheral IV 11/10/23 Distal;Left;Upper;Ventral (anterior) Arm <1 day                        Imaging: Reviewed radiology reports from this admission including: chest xray  XR chest 2 views   ED Interpretation by Jaclyn Peter MD (11/10 1435)   CXR with no acute cardiopulmonary abnormalities. Final Result by Mirian Hodges MD (11/10 1630)      No acute cardiopulmonary disease. Workstation performed: [id number]         NM inpatient order    (Results Pending)       EKG and Other Studies Reviewed on Admission:   EKG: Sinus Tachycardia.  with T wave inversion in lead III, prolonged QTc.    ** Please Note: This note has been constructed using a voice recognition system.  **

## 2023-11-10 NOTE — ASSESSMENT & PLAN NOTE
Ambulates with walker at home.  Frequent falls recently with admission on 8/26    Plan  PT/OT eval and treat  Orthostatic vital signs  Continue meclizine prn for dizziness

## 2023-11-10 NOTE — ASSESSMENT & PLAN NOTE
Patient presents from PCP office with chest pressure, SOB, lightheadedness and hypoxia with exertion, and tachycardia  At rest in the ED O2Sat mid 90s on RA. Desat to high 80s with ambulation and tachycardia  EKG sinus tachycardia with T wave inversion in lead III, prolonged Qtc  Last echo 2/23 showing 65% EF and normal systolic function. No aortic stenosis  CXR normal per my read. History of tobacco use, quit 40 years ago  History of falls in the past 3 months with unknown etiology. Seen by ENT in 9/23 - likely medication induced vs vascular issue. Vestibular therapy recommended  Elevated D-dimer 0.86. Troponin negative, BNP 25  SADAF with Cr 1.48 (baseline 0.8-1)    Plan  Order V/Q scan for PE rule out. If kidney function normalizes tomorrow with fluids and PO intake, switch to CTA PE with steroid and Benadryl pre-treatment.  Has contrast allergy (hives)  Heparin gtt started  Repeat TTE  Ambulatory pulse ox  Monitor on telemetry  May need stress test outpatient for CAD workup if PE ruled out

## 2023-11-11 ENCOUNTER — APPOINTMENT (INPATIENT)
Dept: VASCULAR ULTRASOUND | Facility: HOSPITAL | Age: 78
DRG: 286 | End: 2023-11-11
Payer: MEDICARE

## 2023-11-11 LAB
ANION GAP SERPL CALCULATED.3IONS-SCNC: 8 MMOL/L
APTT PPP: 93 SECONDS (ref 23–37)
APTT PPP: 94 SECONDS (ref 23–37)
BASOPHILS # BLD AUTO: 0.06 THOUSANDS/ÂΜL (ref 0–0.1)
BASOPHILS NFR BLD AUTO: 1 % (ref 0–1)
BUN SERPL-MCNC: 27 MG/DL (ref 5–25)
CALCIUM SERPL-MCNC: 9.1 MG/DL (ref 8.4–10.2)
CHLORIDE SERPL-SCNC: 104 MMOL/L (ref 96–108)
CHOLEST SERPL-MCNC: 129 MG/DL
CO2 SERPL-SCNC: 26 MMOL/L (ref 21–32)
CREAT SERPL-MCNC: 1.26 MG/DL (ref 0.6–1.3)
EOSINOPHIL # BLD AUTO: 0.37 THOUSAND/ÂΜL (ref 0–0.61)
EOSINOPHIL NFR BLD AUTO: 4 % (ref 0–6)
ERYTHROCYTE [DISTWIDTH] IN BLOOD BY AUTOMATED COUNT: 14.9 % (ref 11.6–15.1)
GFR SERPL CREATININE-BSD FRML MDRD: 40 ML/MIN/1.73SQ M
GLUCOSE SERPL-MCNC: 133 MG/DL (ref 65–140)
GLUCOSE SERPL-MCNC: 143 MG/DL (ref 65–140)
GLUCOSE SERPL-MCNC: 152 MG/DL (ref 65–140)
GLUCOSE SERPL-MCNC: 161 MG/DL (ref 65–140)
GLUCOSE SERPL-MCNC: 167 MG/DL (ref 65–140)
HCT VFR BLD AUTO: 33.8 % (ref 34.8–46.1)
HDLC SERPL-MCNC: 50 MG/DL
HGB BLD-MCNC: 10.3 G/DL (ref 11.5–15.4)
IMM GRANULOCYTES # BLD AUTO: 0.04 THOUSAND/UL (ref 0–0.2)
IMM GRANULOCYTES NFR BLD AUTO: 1 % (ref 0–2)
LDLC SERPL CALC-MCNC: 62 MG/DL (ref 0–100)
LYMPHOCYTES # BLD AUTO: 2 THOUSANDS/ÂΜL (ref 0.6–4.47)
LYMPHOCYTES NFR BLD AUTO: 24 % (ref 14–44)
MAGNESIUM SERPL-MCNC: 1.5 MG/DL (ref 1.9–2.7)
MCH RBC QN AUTO: 27.8 PG (ref 26.8–34.3)
MCHC RBC AUTO-ENTMCNC: 30.5 G/DL (ref 31.4–37.4)
MCV RBC AUTO: 91 FL (ref 82–98)
MONOCYTES # BLD AUTO: 0.78 THOUSAND/ÂΜL (ref 0.17–1.22)
MONOCYTES NFR BLD AUTO: 9 % (ref 4–12)
NEUTROPHILS # BLD AUTO: 5.11 THOUSANDS/ÂΜL (ref 1.85–7.62)
NEUTS SEG NFR BLD AUTO: 61 % (ref 43–75)
NRBC BLD AUTO-RTO: 0 /100 WBCS
PLATELET # BLD AUTO: 265 THOUSANDS/UL (ref 149–390)
PMV BLD AUTO: 11.1 FL (ref 8.9–12.7)
POTASSIUM SERPL-SCNC: 4.4 MMOL/L (ref 3.5–5.3)
RBC # BLD AUTO: 3.71 MILLION/UL (ref 3.81–5.12)
SODIUM SERPL-SCNC: 138 MMOL/L (ref 135–147)
TRIGL SERPL-MCNC: 84 MG/DL
WBC # BLD AUTO: 8.36 THOUSAND/UL (ref 4.31–10.16)

## 2023-11-11 PROCEDURE — 83735 ASSAY OF MAGNESIUM: CPT | Performed by: PHARMACIST

## 2023-11-11 PROCEDURE — 93970 EXTREMITY STUDY: CPT

## 2023-11-11 PROCEDURE — 80048 BASIC METABOLIC PNL TOTAL CA: CPT | Performed by: PHARMACIST

## 2023-11-11 PROCEDURE — 80061 LIPID PANEL: CPT

## 2023-11-11 PROCEDURE — 93970 EXTREMITY STUDY: CPT | Performed by: INTERNAL MEDICINE

## 2023-11-11 PROCEDURE — 99232 SBSQ HOSP IP/OBS MODERATE 35: CPT | Performed by: INTERNAL MEDICINE

## 2023-11-11 PROCEDURE — 99223 1ST HOSP IP/OBS HIGH 75: CPT | Performed by: INTERNAL MEDICINE

## 2023-11-11 PROCEDURE — 85730 THROMBOPLASTIN TIME PARTIAL: CPT | Performed by: INTERNAL MEDICINE

## 2023-11-11 PROCEDURE — 82948 REAGENT STRIP/BLOOD GLUCOSE: CPT

## 2023-11-11 PROCEDURE — 85025 COMPLETE CBC W/AUTO DIFF WBC: CPT | Performed by: PHARMACIST

## 2023-11-11 RX ORDER — MAGNESIUM HYDROXIDE/ALUMINUM HYDROXICE/SIMETHICONE 120; 1200; 1200 MG/30ML; MG/30ML; MG/30ML
30 SUSPENSION ORAL EVERY 4 HOURS PRN
Status: DISCONTINUED | OUTPATIENT
Start: 2023-11-11 | End: 2023-11-15 | Stop reason: HOSPADM

## 2023-11-11 RX ORDER — MAGNESIUM SULFATE HEPTAHYDRATE 40 MG/ML
2 INJECTION, SOLUTION INTRAVENOUS ONCE
Status: COMPLETED | OUTPATIENT
Start: 2023-11-11 | End: 2023-11-11

## 2023-11-11 RX ORDER — DIPHENHYDRAMINE HCL 25 MG
50 TABLET ORAL
Status: DISCONTINUED | OUTPATIENT
Start: 2023-11-11 | End: 2023-11-11

## 2023-11-11 RX ORDER — POLYETHYLENE GLYCOL 3350 17 G/17G
17 POWDER, FOR SOLUTION ORAL DAILY
Status: DISCONTINUED | OUTPATIENT
Start: 2023-11-11 | End: 2023-11-15 | Stop reason: HOSPADM

## 2023-11-11 RX ORDER — METHYLPREDNISOLONE 16 MG/1
32 TABLET ORAL
Status: DISCONTINUED | OUTPATIENT
Start: 2023-11-11 | End: 2023-11-11

## 2023-11-11 RX ADMIN — LATANOPROST 1 DROP: 50 SOLUTION OPHTHALMIC at 21:19

## 2023-11-11 RX ADMIN — INSULIN LISPRO 1 UNITS: 100 INJECTION, SOLUTION INTRAVENOUS; SUBCUTANEOUS at 12:52

## 2023-11-11 RX ADMIN — SENNOSIDES, DOCUSATE SODIUM 2 TABLET: 8.6; 5 TABLET ORAL at 21:18

## 2023-11-11 RX ADMIN — MILNACIPRAN HYDROCHLORIDE 100 MG: 100 TABLET, FILM COATED ORAL at 13:59

## 2023-11-11 RX ADMIN — HEPARIN SODIUM 11 UNITS/KG/HR: 10000 INJECTION, SOLUTION INTRAVENOUS at 20:37

## 2023-11-11 RX ADMIN — ATORVASTATIN CALCIUM 40 MG: 40 TABLET, FILM COATED ORAL at 10:00

## 2023-11-11 RX ADMIN — POLYETHYLENE GLYCOL 3350 17 G: 17 POWDER, FOR SOLUTION ORAL at 14:56

## 2023-11-11 RX ADMIN — Medication 6 MG: at 21:18

## 2023-11-11 RX ADMIN — LEVOTHYROXINE SODIUM 37.5 MCG: 75 TABLET ORAL at 10:00

## 2023-11-11 RX ADMIN — INSULIN LISPRO 1 UNITS: 100 INJECTION, SOLUTION INTRAVENOUS; SUBCUTANEOUS at 21:21

## 2023-11-11 RX ADMIN — PANTOPRAZOLE SODIUM 40 MG: 40 TABLET, DELAYED RELEASE ORAL at 10:06

## 2023-11-11 RX ADMIN — ASPIRIN 81 MG CHEWABLE TABLET 81 MG: 81 TABLET CHEWABLE at 10:00

## 2023-11-11 RX ADMIN — MAGNESIUM SULFATE HEPTAHYDRATE 2 G: 40 INJECTION, SOLUTION INTRAVENOUS at 10:02

## 2023-11-11 RX ADMIN — METOPROLOL SUCCINATE ER TABLETS 25 MG: 25 TABLET, FILM COATED, EXTENDED RELEASE ORAL at 10:00

## 2023-11-11 RX ADMIN — INSULIN LISPRO 1 UNITS: 100 INJECTION, SOLUTION INTRAVENOUS; SUBCUTANEOUS at 16:41

## 2023-11-11 RX ADMIN — ALUMINUM HYDROXIDE, MAGNESIUM HYDROXIDE, AND DIMETHICONE 30 ML: 200; 20; 200 SUSPENSION ORAL at 10:06

## 2023-11-11 RX ADMIN — OXYBUTYNIN CHLORIDE 10 MG: 5 TABLET, EXTENDED RELEASE ORAL at 10:00

## 2023-11-11 RX ADMIN — OXYCODONE HYDROCHLORIDE AND ACETAMINOPHEN 2 TABLET: 5; 325 TABLET ORAL at 20:37

## 2023-11-11 RX ADMIN — ESCITALOPRAM OXALATE 10 MG: 10 TABLET ORAL at 10:00

## 2023-11-11 NOTE — PROGRESS NOTES
[facility] [facility]  Progress Note  Name: Anselmo De La Cruz  MRN: [id number]  Unit/Bed#: S -01 I Date of Admission: 11/10/2023   Date of Service: 11/11/2023 I Hospital Day: 1    Assessment/Plan   * Acute respiratory failure  Assessment & Plan  Patient presents from PCP office with chest pressure, SOB, lightheadedness and hypoxia with exertion, and tachycardia  At rest in the ED O2Sat mid 90s on RA. Desat to high 80s with ambulation and tachycardia  EKG sinus tachycardia with T wave inversion in lead III, prolonged Qtc  Last echo 2/23 showing 65% EF and normal systolic function. No aortic stenosis  CXR normal per my read. History of tobacco use, quit 40 years ago  History of falls in the past 3 months with unknown etiology. Seen by ENT in 9/23 - likely medication induced vs vascular issue. Vestibular therapy recommended  Elevated D-dimer 0.86. Troponin negative, BNP 25  SADAF with Cr 1.48 on admission  (baseline 0.8-1)  Creatinine improved 1.26   Patient gets chest pressure and desaturates when ambulating   No evidence of acute or chronic deep vein thrombosis VAS lower limb     Plan   Continue Heparin gtt   V/Q scan for PE rule out ordered for Monday  Cardiology consulted due to chest pressure with ambulation   Possible Cardiac Cath on Tuesday  if no PE but will need Contrast allergy prep for Monday    Repeat TTE  Ambulatory pulse ox  Monitor on telemetry      History of lumbar surgery  Assessment & Plan  Prior thoracic and lumbar surgery, prior laminectomy, spinal cord stimulator in place (non-functional per daughter)    Plan  Continue PTA Percocet 10/325 mg Q12 prn  Continue PTA Savella  Continue PTA gabapentin 300 mg hs and baclofen prn    Ambulatory dysfunction  Assessment & Plan  Ambulates with walker at home.  Frequent falls recently with admission on 8/26    Plan  PT/OT eval and treat  Orthostatic vital signs  Continue meclizine prn for dizziness    Anemia  Assessment & Plan  Hb 10.4, within baseline on 10-11, MCV normal. Likely anemia of chronic disease vs mixed anemia (micro+macro with averaging to normal MCV), no recent iron panel, B12, folate. Prior iron and ferritin low 3 years ago  B12 and Folate  wnl, iron-31, TIBC 331, iron saturation 9     Plan  Can start iron supplement tomorrow  Bowel regime to avoid constipation     Acute kidney injury ([address])  Assessment & Plan  POA Cr 1.48 with baseline 0.8-1. Possible due to decreased PO intake per daughter report  Creatinine improved to 1.26 today   Plan    Encourage PO intake  Hold PTA lisinopril in setting of SADAF  Repeat BMP. Replete electrolytes prn    Overactive bladder  Assessment & Plan  Start oxybutynin, replacement for home trospium per formulary    Hypothyroidism  Assessment & Plan  Continue PTA levothyroxine    Hypertension  Assessment & Plan  Continue PTA metoprolol  Hold lisinopril in setting of SADAF    Glaucoma  Assessment & Plan  Continue PTA latanoprost    Type 2 diabetes mellitus [facility])  Assessment & Plan  Lab Results   Component Value Date    HGBA1C 6.6 (H) 06/09/2023       Recent Labs     11/10/23  1749 11/10/23  2308 11/11/23  0823 11/11/23  1224   POCGLU 142* 139 133 167*       Blood Sugar Average: Last 72 hrs:  (P) 145.25  Well-controlled. Hold PTA metformin, initiate sliding scale               VTE Pharmacologic Prophylaxis: VTE Score: 6 High Risk (Score >/= 5) - Pharmacological DVT Prophylaxis Ordered: heparin drip. Sequential Compression Devices Ordered. Patient Centered Rounds: I performed bedside rounds with nursing staff today. Discussions with Specialists or Other Care Team Provider: Cardiology     Education and Discussions with Family / Patient: Updated  (daughter) at bedside.     Current Length of Stay: 1 day(s)  Current Patient Status: Inpatient   Discharge Plan: TBA    Code Status: Level 1 - Full Code    Subjective:   Patient needed melatonin overnight to help her sleep and her Telemetry show sinus tachycardia. This morning patient is off oxygen but desaturates when she walks. She reported chest discomfort when ambulating. Denied headache, fever and chill. Objective:     Vitals:   Temp (24hrs), Av.3 °F (36.8 °C), Min:97.7 °F (36.5 °C), Max:98.6 °F (37 °C)    Temp:  [97.7 °F (36.5 °C)-98.6 °F (37 °C)] 97.7 °F (36.5 °C)  HR:  [109-112] 111  BP: (113-137)/(71-85) 127/71  SpO2:  [96 %-98 %] 96 %  There is no height or weight on file to calculate BMI. Input and Output Summary (last 24 hours): Intake/Output Summary (Last 24 hours) at 2023 1642  Last data filed at 11/10/2023 1917  Gross per 24 hour   Intake 240 ml   Output --   Net 240 ml       Physical Exam:   Physical Exam  Vitals and nursing note reviewed. Constitutional:       General: She is not in acute distress. Appearance: She is well-developed. HENT:      Head: Normocephalic and atraumatic. Mouth/Throat:      Mouth: Mucous membranes are moist.   Eyes:      Conjunctiva/sclera: Conjunctivae normal.   Cardiovascular:      Rate and Rhythm: Regular rhythm. Tachycardia present. Heart sounds: Murmur heard. Pulmonary:      Effort: Pulmonary effort is normal. No respiratory distress. Breath sounds: Normal breath sounds. Abdominal:      Palpations: Abdomen is soft. Tenderness: There is no abdominal tenderness. Musculoskeletal:         General: No swelling. Cervical back: Neck supple. Skin:     General: Skin is warm and dry. Capillary Refill: Capillary refill takes less than 2 seconds. Neurological:      Mental Status: She is alert and oriented to person, place, and time.    Psychiatric:         Mood and Affect: Mood normal.        Additional Data:     Labs:  Results from last 7 days   Lab Units 23  0833   WBC Thousand/uL 8.36   HEMOGLOBIN g/dL 10.3*   HEMATOCRIT % 33.8*   PLATELETS Thousands/uL 265   NEUTROS PCT % 61   LYMPHS PCT % 24   MONOS PCT % 9   EOS PCT % 4     Results from last 7 days Lab Units 11/11/23  0833 11/10/23  1332   SODIUM mmol/L 138 136   POTASSIUM mmol/L 4.4 4.6   CHLORIDE mmol/L 104 101   CO2 mmol/L 26 25   BUN mg/dL 27* 33*   CREATININE mg/dL 1.26 1.48*   ANION GAP mmol/L 8 10   CALCIUM mg/dL 9.1 9.3   ALBUMIN g/dL  --  3.7   TOTAL BILIRUBIN mg/dL  --  0.28   ALK PHOS U/L  --  84   ALT U/L  --  8   AST U/L  --  14   GLUCOSE RANDOM mg/dL 143* 169*     Results from last 7 days   Lab Units 11/10/23  1332   INR  0.90     Results from last 7 days   Lab Units 11/11/23  1615 11/11/23  1224 11/11/23  0823 11/10/23  2308 11/10/23  1749   POC GLUCOSE mg/dl 161* 167* 133 139 142*               Lines/Drains:  Invasive Devices       Peripheral Intravenous Line  Duration             Peripheral IV 11/10/23 Distal;Left;Upper;Ventral (anterior) Arm 1 day    Peripheral IV 11/10/23 Proximal;Right;Ventral (anterior) Antecubital <1 day                      Telemetry:  Telemetry Orders (From admission, onward)               24 Hour Telemetry Monitoring  Continuous x 24 Hours (Telem)        Comments: Currently PE rule out   Expiring   Question:  Reason for 24 Hour Telemetry  Answer:  Pulmonary Embolism                     Telemetry Reviewed: Sinus Tachycardia  Indication for Continued Telemetry Use: PE             Imaging: Reviewed radiology reports from this admission including: chest xray    Recent Cultures (last 7 days):         Last 24 Hours Medication List:   Current Facility-Administered Medications   Medication Dose Route Frequency Provider Last Rate    aluminum-magnesium hydroxide-simethicone  30 mL Oral Q4H PRN Franci Richardson DO      aspirin  81 mg Oral Daily Lisa Pittman MD      atorvastatin  40 mg Oral Daily Lisa Pittman MD      baclofen  10 mg Oral BID PRN Lisa Pittman MD      escitalopram  10 mg Oral Daily Lisa Pittman MD      gabapentin  300 mg Oral HS Lisa Pittman MD      heparin (porcine)  3-30 Units/kg/hr (Order-Specific) Intravenous Titrated Aracely Eng MD 13 Units/kg/hr (11/11/23 1000) heparin (porcine)  2,600 Units Intravenous Q6H PRN Deyvi Wellington MD      heparin (porcine)  5,200 Units Intravenous Q6H PRN Deyvi Wellington MD      insulin lispro  1-5 Units Subcutaneous 4x Daily (AC & HS) Clarissa Gerber MD      latanoprost  1 drop Both Eyes HS Clarissa Gerber MD      levothyroxine  37.5 mcg Oral Daily Clarissa Gerber MD      meclizine  12.5 mg Oral Q8H PRN Clarissa Gerber MD      melatonin  6 mg Oral HS Jong Fulton DO      metoprolol succinate  25 mg Oral Daily Clarissa Gerber MD      Milnacipran HCl  1 tablet Oral Daily Clarissa Gerber MD      oxybutynin  10 mg Oral Daily Clarissa Gerber MD      oxyCODONE-acetaminophen  2 tablet Oral Q12H PRN Clarissa Gerber MD      pantoprazole  40 mg Oral Daily Clarissa Gerber MD      polyethylene glycol  17 g Oral Daily Otis Cortes MD      senna-docusate sodium  2 tablet Oral HS Clarissa Gerber MD          Today, Patient Was Seen By: Otis Cortes MD    **Please Note: This note may have been constructed using a voice recognition system. **

## 2023-11-11 NOTE — UTILIZATION REVIEW
Initial Clinical Review    Admission: Date/Time/Statement:   Admission Orders (From admission, onward)       Ordered        11/10/23 1602  INPATIENT ADMISSION  Once                          Orders Placed This Encounter   Procedures    INPATIENT ADMISSION     Standing Status:   Standing     Number of Occurrences:   1     Order Specific Question:   Level of Care     Answer:   Med Surg [16]     Order Specific Question:   Estimated length of stay     Answer:   More than 2 Midnights     Order Specific Question:   Certification     Answer:   I certify that inpatient services are medically necessary for this patient for a duration of greater than two midnights. See H&P and MD Progress Notes for additional information about the patient's course of treatment. ED Arrival Information       Expected   -    Arrival   11/10/2023 12:22    Acuity   Urgent              Means of arrival   Ambulance    Escorted by   [facility]    Service   Hospitalist    Admission type   Emergency              Arrival complaint   ems             Chief Complaint   Patient presents with    Shortness of Breath     Pt has SOB with activity. Pt sent in from 6 month health check because her toes were blue       Initial Presentation: 78 y.o. female with a PMH of HTN, HLD, T2DM, OAB, hypothyroidism, prior lumbar and thoracic surgery, chronic pain who presents from PCP office due to hypoxia SpO2 in 70s, HR 160s with ambulation, chest pressure, palpitations, lightheadedness. In the ED, O2sat in 90s at rest, but desat to high 80s with ambulation and tachycardia in 130s, with recurrence of similar symptoms. Not on home O2. Per patient and daughter report, symptoms have recurred for the past few months, more frequent recently, along with falls and worsening ambulatory function. Has chronic pain from prior spine surgeries and constipation with BM every 4 days.  Plan: Inpatient admission for evaluation and treatment of shortness of breath, SADAF with creatinine of 1.48 (baseline 0.8-1), ambulatory dysfunction, anemia, DM, hx of lumbar surgery, HTN, overactive bladder, hypothyroidism, glaucoma: VQ scan, heparin drip, repeat TTE, ambulatory pulse ox, telemetry, IV fluids, hold lisinopril, repeat BMP, orthostatic vitals, PT/OT eval, prn meclizine, iron panel, B12, folate, continue Percocet, Savella and gabapentin, start oxybutynin, continue levothyroxine and latanoprost.     Date: 11/11   Day 2:     Cardiology consult: Echo pending, plan for ischemic work up once VQ scan has resulted on Monday, creatinine improving today, continue metoprolol. Date: 11/12    Day 3: Has surpassed a 2nd midnight with active treatments and services, which include heparin drip      ED Triage Vitals   Temperature Pulse Respirations Blood Pressure SpO2   11/10/23 1233 11/10/23 1233 11/10/23 1234 11/10/23 1233 11/10/23 1233   97.5 °F (36.4 °C) 100 17 135/72 95 %      Temp src Heart Rate Source Patient Position - Orthostatic VS BP Location FiO2 (%)   -- -- -- -- --             Pain Score       11/10/23 1810       6          Wt Readings from Last 1 Encounters:   09/13/23 69.4 kg (153 lb)     Additional Vital Signs:     Date/Time Temp Pulse Resp BP MAP (mmHg) SpO2 O2 Device   11/11/23 08:22:53 98.5 °F (36.9 °C) 111 Abnormal  -- 119/81 94 96 % --   11/11/23 01:07:58 98.6 °F (37 °C) 109 Abnormal  -- 113/85 94 97 % --   11/10/23 1810 -- -- -- -- -- 98 % None (Room air)   11/10/23 1745 -- 112 Abnormal  -- 137/71 99 97 % --   11/10/23 1504 -- -- -- -- -- 97 % None (Room air)   11/10/23 1500 -- -- -- -- -- 97 % --   11/10/23 1455 -- -- -- -- -- 97 % --   11/10/23 1332 -- 101 -- 133/71 96 98 % --   11/10/23 1322 -- -- -- -- -- 96 % --   11/10/23 1300 -- 100 -- 131/70 93 99 % --   11/10/23 1234 -- -- 17 -- -- 92 % --     Pertinent Labs/Diagnostic Test Results:   XR chest 2 views   ED Interpretation by Bertrand Damon MD (11/10 8639)   CXR with no acute cardiopulmonary abnormalities.        Final Result by Chuy Gee MD (11/10 1630)      No acute cardiopulmonary disease.                Workstation performed: [id number]         NM inpatient order    (Results Pending)   VAS lower limb venous duplex study, complete bilateral    (Results Pending)     11/10 EKG:  Normal sinus rhythm  Left bundle branch block  Abnormal ECG  When compared with ECG of 26-AUG-2023 15:17,  No significant change was found      Results from last 7 days   Lab Units 11/11/23  0833 11/10/23  2240 11/10/23  1332   WBC Thousand/uL 8.36  --  9.55   HEMOGLOBIN g/dL 10.3*  --  10.4*   HEMATOCRIT % 33.8*  --  33.6*   PLATELETS Thousands/uL 265 281 263   NEUTROS ABS Thousands/µL 5.11  --  7.29         Results from last 7 days   Lab Units 11/11/23  0833 11/10/23  1332   SODIUM mmol/L 138 136   POTASSIUM mmol/L 4.4 4.6   CHLORIDE mmol/L 104 101   CO2 mmol/L 26 25   ANION GAP mmol/L 8 10   BUN mg/dL 27* 33*   CREATININE mg/dL 1.26 1.48*   EGFR ml/min/1.73sq m 40 33   CALCIUM mg/dL 9.1 9.3   MAGNESIUM mg/dL 1.5*  --      Results from last 7 days   Lab Units 11/10/23  1332   AST U/L 14   ALT U/L 8   ALK PHOS U/L 84   TOTAL PROTEIN g/dL 6.4   ALBUMIN g/dL 3.7   TOTAL BILIRUBIN mg/dL 0.28     Results from last 7 days   Lab Units 11/11/23  1224 11/11/23  0823 11/10/23  2308 11/10/23  1749   POC GLUCOSE mg/dl 167* 133 139 142*     Results from last 7 days   Lab Units 11/11/23  0833 11/10/23  1332   GLUCOSE RANDOM mg/dL 143* 169*           Results from last 7 days   Lab Units 11/10/23  1744 11/10/23  1546 11/10/23  1332   HS TNI 0HR ng/L  --   --  10   HS TNI 2HR ng/L  --  12  --    HSTNI D2 ng/L  --  2  --    HS TNI 4HR ng/L 14  --   --    HSTNI D4 ng/L 4  --   --      Results from last 7 days   Lab Units 11/10/23  1332   D-DIMER QUANTITATIVE ug/ml FEU 0.86*     Results from last 7 days   Lab Units 11/11/23  0833 11/10/23  2240 11/10/23  1332   PROTIME seconds  --   --  12.7   INR   --   --  0.90   PTT seconds 93* 194* 28           Results from last 7 days   Lab Units 11/10/23  1332   BNP pg/mL 25     Results from last 7 days   Lab Units 11/10/23  1332   FERRITIN ng/mL 21   IRON SATURATION % 9*   IRON ug/dL 31*   TIBC ug/dL 331         ED Treatment:   Medication Administration from 11/10/2023 1222 to 11/10/2023 1804         Date/Time Order Dose Route Action     11/10/2023 1622 EST heparin (porcine) injection 5,200 Units 5,200 Units Intravenous Given     11/10/2023 1621 EST heparin (porcine) 25,000 units in 0.45% NaCl 250 mL infusion (premix) 18 Units/kg/hr Intravenous New Bag     11/10/2023 1745 EST multi-electrolyte (PLASMALYTE-A/ISOLYTE-S PH 7.4) IV solution 75 mL/hr Intravenous New Bag          Past Medical History:   Diagnosis Date    Acid reflux     Anemia     hx of iron-deficient    Anxiety     Arthritis     Asthma     last needed inhaler last year 2020    Basal cell carcinoma     upper lip    Chronic narcotic dependence (HCC)     Chronic pain     Colon polyp     Cystocele     Diabetes mellitus ([address])     stable    Disease of thyroid gland     hypothyroidism    Diverticulosis     Dizziness     at times    Dysfunctional uterine bleeding     last assessed - [id number]    Dysphagia     Fibromyalgia     Gastric ulcer     Gastroparesis     History of colonic polyps     last assessed - [id number]    History of gastroesophageal reflux (GERD)     Hypercholesterolemia     Hyperlipidemia     Hypertension     IBS (irritable bowel syndrome)     Pneumobilia 06/18/2022    Post laminectomy syndrome     S/P insertion of spinal cord stimulator 07/18/2018    Seasonal allergies     Shortness of breath     exertional    Spinal stenosis     Status post lumbar spinal fusion 03/16/2018    Stroke [facility])     pt states slight stroke March 2022     Present on Admission:   Glaucoma   Hypothyroidism   Overactive bladder   Acute kidney injury ([address])   Ambulatory dysfunction   Type 2 diabetes mellitus ([address])   Hypertension   Anemia      Admitting Diagnosis: Shortness of breath [R06.02]  Chest pain [R07.9]  SOB (shortness of breath) [R06.02]  Abnormal EKG [R94.31]  SADAF (acute kidney injury) ([address]) [N17.9]  Age/Sex: 78 y.o. female  Admission Orders:  Scheduled Medications:  aspirin, 81 mg, Oral, Daily  atorvastatin, 40 mg, Oral, Daily  escitalopram, 10 mg, Oral, Daily  gabapentin, 300 mg, Oral, HS  insulin lispro, 1-5 Units, Subcutaneous, 4x Daily (AC & HS)  latanoprost, 1 drop, Both Eyes, HS  levothyroxine, 37.5 mcg, Oral, Daily  melatonin, 6 mg, Oral, HS  metoprolol succinate, 25 mg, Oral, Daily  Milnacipran HCl, 1 tablet, Oral, Daily  oxybutynin, 10 mg, Oral, Daily  pantoprazole, 40 mg, Oral, Daily  senna-docusate sodium, 2 tablet, Oral, HS      Continuous IV Infusions:  heparin (porcine), 3-30 Units/kg/hr (Order-Specific), Intravenous, Titrated      PRN Meds:  aluminum-magnesium hydroxide-simethicone, 30 mL, Oral, Q4H PRN  baclofen, 10 mg, Oral, BID PRN  heparin (porcine), 2,600 Units, Intravenous, Q6H PRN  heparin (porcine), 5,200 Units, Intravenous, Q6H PRN  meclizine, 12.5 mg, Oral, Q8H PRN  oxyCODONE-acetaminophen, 2 tablet, Oral, Q12H PRN        IP CONSULT TO CARDIOLOGY    Network Utilization Review Department  ATTENTION: Please call with any questions or concerns to [phone] and carefully listen to the prompts so that you are directed to the right person. All voicemails are confidential.   For Discharge needs, contact Care Management DC Support Team at [phone] opt. 2  Send all requests for admission clinical reviews, approved or denied determinations and any other requests to dedicated fax number below belonging to the campus where the patient is receiving treatment.  List of dedicated fax numbers for the Facilities:  [place] DENIALS (Administrative/Medical Necessity) [phone]   DISCHARGE SUPPORT TEAM (NETWORK) [id number] (Maternity/NICU/Pediatrics) [phone] [address] [phone] [facility] [address] [facility] [phone] [address] [address] [facility] [address] [place] [address] Nn [phone]

## 2023-11-11 NOTE — ASSESSMENT & PLAN NOTE
Lab Results   Component Value Date    HGBA1C 6.6 (H) 06/09/2023       Recent Labs     11/10/23  1749 11/10/23  2308 11/11/23  0823 11/11/23  1224   POCGLU 142* 139 133 167*       Blood Sugar Average: Last 72 hrs:  (P) 145.25  Well-controlled.  Hold PTA metformin, initiate sliding scale

## 2023-11-11 NOTE — PLAN OF CARE
Problem: Potential for Falls  Goal: Patient will remain free of falls  Description: INTERVENTIONS:  - Educate patient/family on patient safety including physical limitations  - Instruct patient to call for assistance with activity   - Consult OT/PT to assist with strengthening/mobility   - Keep Call bell within reach  - Keep bed low and locked with side rails adjusted as appropriate  - Keep care items and personal belongings within reach  - Initiate and maintain comfort rounds  - Make Fall Risk Sign visible to staff  - Offer Toileting every  Hours, in advance of need  - Initiate/Maintain larm  - Obtain necessary fall risk management equipment:   - Apply yellow socks and bracelet for high fall risk patients  - Consider moving patient to room near nurses station  Outcome: Progressing     Problem: MOBILITY - ADULT  Goal: Maintain or return to baseline ADL function  Description: INTERVENTIONS:  -  Assess patient's ability to carry out ADLs; assess patient's baseline for ADL function and identify physical deficits which impact ability to perform ADLs (bathing, care of mouth/teeth, toileting, grooming, dressing, etc.)  - Assess/evaluate cause of self-care deficits   - Assess range of motion  - Assess patient's mobility; develop plan if impaired  - Assess patient's need for assistive devices and provide as appropriate  - Encourage maximum independence but intervene and supervise when necessary  - Involve family in performance of ADLs  - Assess for home care needs following discharge   - Consider OT consult to assist with ADL evaluation and planning for discharge  - Provide patient education as appropriate  Outcome: Progressing  Goal: Maintains/Returns to pre admission functional level  Description: INTERVENTIONS:  - Perform BMAT or MOVE assessment daily.   - Set and communicate daily mobility goal to care team and patient/family/caregiver.    - Collaborate with rehabilitation services on mobility goals if consulted  - Perform Range of Motion times a day. - Reposition patient every  hours.   - Dangle patient  times a day  - Stand patient  times a day  - Ambulate patient  times a day  - Out of bed to chair times a day   - Out of bed for meals  times a day  - Out of bed for toileting  - Record patient progress and toleration of activity level   Outcome: Progressing     Problem: Prexisting or High Potential for Compromised Skin Integrity  Goal: Skin integrity is maintained or improved  Description: INTERVENTIONS:  - Identify patients at risk for skin breakdown  - Assess and monitor skin integrity  - Assess and monitor nutrition and hydration status  - Monitor labs   - Assess for incontinence   - Turn and reposition patient  - Assist with mobility/ambulation  - Relieve pressure over bony prominences  - Avoid friction and shearing  - Provide appropriate hygiene as needed including keeping skin clean and dry  - Evaluate need for skin moisturizer/barrier cream  - Collaborate with interdisciplinary team   - Patient/family teaching  - Consider wound care consult   Outcome: Progressing

## 2023-11-11 NOTE — ASSESSMENT & PLAN NOTE
POA Cr 1.48 with baseline 0.8-1. Possible due to decreased PO intake per daughter report  Creatinine improved to 1.26 today   Plan    Encourage PO intake  Hold PTA lisinopril in setting of SADAF  Repeat BMP.  Replete electrolytes prn

## 2023-11-11 NOTE — PLAN OF CARE
Problem: Potential for Falls  Goal: Patient will remain free of falls  Description: INTERVENTIONS:  - Educate patient/family on patient safety including physical limitations  - Instruct patient to call for assistance with activity   - Consult OT/PT to assist with strengthening/mobility   - Keep Call bell within reach  - Keep bed low and locked with side rails adjusted as appropriate  - Keep care items and personal belongings within reach  - Initiate and maintain comfort rounds  - Make Fall Risk Sign visible to staff  - Apply yellow socks and bracelet for high fall risk patients  - Consider moving patient to room near nurses station  Outcome: Progressing     Problem: MOBILITY - ADULT  Goal: Maintain or return to baseline ADL function  Description: INTERVENTIONS:  -  Assess patient's ability to carry out ADLs; assess patient's baseline for ADL function and identify physical deficits which impact ability to perform ADLs (bathing, care of mouth/teeth, toileting, grooming, dressing, etc.)  - Assess/evaluate cause of self-care deficits   - Assess range of motion  - Assess patient's mobility; develop plan if impaired  - Assess patient's need for assistive devices and provide as appropriate  - Encourage maximum independence but intervene and supervise when necessary  - Involve family in performance of ADLs  - Assess for home care needs following discharge   - Consider OT consult to assist with ADL evaluation and planning for discharge  - Provide patient education as appropriate  Outcome: Progressing  Goal: Maintains/Returns to pre admission functional level  Description: INTERVENTIONS:  - Perform BMAT or MOVE assessment daily.   - Set and communicate daily mobility goal to care team and patient/family/caregiver.    - Collaborate with rehabilitation services on mobility goals if consulted  - Perform Range of Motion   - Out of bed for toileting  - Record patient progress and toleration of activity level   Outcome: Progressing

## 2023-11-11 NOTE — ASSESSMENT & PLAN NOTE
Hb 10.4, within baseline on 10-11, MCV normal. Likely anemia of chronic disease vs mixed anemia (micro+macro with averaging to normal MCV), no recent iron panel, B12, folate.  Prior iron and ferritin low 3 years ago  B12 and Folate  wnl, iron-31, TIBC 331, iron saturation 9     Plan  Can start iron supplement tomorrow  Bowel regime to avoid constipation

## 2023-11-11 NOTE — ASSESSMENT & PLAN NOTE
Patient presents from PCP office with chest pressure, SOB, lightheadedness and hypoxia with exertion, and tachycardia  At rest in the ED O2Sat mid 90s on RA. Desat to high 80s with ambulation and tachycardia  EKG sinus tachycardia with T wave inversion in lead III, prolonged Qtc  Last echo 2/23 showing 65% EF and normal systolic function. No aortic stenosis  CXR normal per my read. History of tobacco use, quit 40 years ago  History of falls in the past 3 months with unknown etiology. Seen by ENT in 9/23 - likely medication induced vs vascular issue. Vestibular therapy recommended  Elevated D-dimer 0.86.  Troponin negative, BNP 25  SADAF with Cr 1.48 on admission  (baseline 0.8-1)  Creatinine improved 1.26   Patient gets chest pressure and desaturates when ambulating   No evidence of acute or chronic deep vein thrombosis VAS lower limb     Plan   Continue Heparin gtt   V/Q scan for PE rule out ordered for Monday  Cardiology consulted due to chest pressure with ambulation   Possible Cardiac Cath on Tuesday  if no PE but will need Contrast allergy prep for Monday    Repeat TTE  Ambulatory pulse ox  Monitor on telemetry

## 2023-11-12 ENCOUNTER — APPOINTMENT (INPATIENT)
Dept: NON INVASIVE DIAGNOSTICS | Facility: HOSPITAL | Age: 78
DRG: 286 | End: 2023-11-12
Payer: MEDICARE

## 2023-11-12 LAB
ANION GAP SERPL CALCULATED.3IONS-SCNC: 4 MMOL/L
AORTIC ROOT: 3.2 CM
AORTIC VALVE MEAN VELOCITY: 12.4 M/S
APICAL FOUR CHAMBER EJECTION FRACTION: 71 %
APTT PPP: 48 SECONDS (ref 23–37)
APTT PPP: 60 SECONDS (ref 23–37)
APTT PPP: 84 SECONDS (ref 23–37)
AV MEAN GRADIENT: 7 MMHG
AV PEAK GRADIENT: 11 MMHG
BASOPHILS # BLD AUTO: 0.07 THOUSANDS/ÂΜL (ref 0–0.1)
BASOPHILS NFR BLD AUTO: 1 % (ref 0–1)
BUN SERPL-MCNC: 23 MG/DL (ref 5–25)
CALCIUM SERPL-MCNC: 9 MG/DL (ref 8.4–10.2)
CHLORIDE SERPL-SCNC: 106 MMOL/L (ref 96–108)
CO2 SERPL-SCNC: 25 MMOL/L (ref 21–32)
CREAT SERPL-MCNC: 1.13 MG/DL (ref 0.6–1.3)
DOP CALC AO PEAK VEL: 1.67 M/S
DOP CALC AO VTI: 36.63 CM
DOP CALC LVOT AREA: 3.14 CM2
DOP CALC LVOT DIAMETER: 2 CM
E WAVE DECELERATION TIME: 145 MS
E/A RATIO: 0.47
EOSINOPHIL # BLD AUTO: 0.47 THOUSAND/ÂΜL (ref 0–0.61)
EOSINOPHIL NFR BLD AUTO: 7 % (ref 0–6)
ERYTHROCYTE [DISTWIDTH] IN BLOOD BY AUTOMATED COUNT: 15.1 % (ref 11.6–15.1)
GFR SERPL CREATININE-BSD FRML MDRD: 46 ML/MIN/1.73SQ M
GLUCOSE SERPL-MCNC: 126 MG/DL (ref 65–140)
GLUCOSE SERPL-MCNC: 136 MG/DL (ref 65–140)
GLUCOSE SERPL-MCNC: 142 MG/DL (ref 65–140)
GLUCOSE SERPL-MCNC: 147 MG/DL (ref 65–140)
GLUCOSE SERPL-MCNC: 160 MG/DL (ref 65–140)
HCT VFR BLD AUTO: 32.6 % (ref 34.8–46.1)
HGB BLD-MCNC: 9.7 G/DL (ref 11.5–15.4)
IMM GRANULOCYTES # BLD AUTO: 0.04 THOUSAND/UL (ref 0–0.2)
IMM GRANULOCYTES NFR BLD AUTO: 1 % (ref 0–2)
LAAS-AP2: 17.3 CM2
LAAS-AP4: 17.9 CM2
LEFT ATRIUM SIZE: 4 CM
LEFT ATRIUM VOLUME (MOD BIPLANE): 49 ML
LEFT ATRIUM VOLUME INDEX (MOD BIPLANE): 29.3 ML/M2
LYMPHOCYTES # BLD AUTO: 1.82 THOUSANDS/ÂΜL (ref 0.6–4.47)
LYMPHOCYTES NFR BLD AUTO: 26 % (ref 14–44)
MCH RBC QN AUTO: 28.4 PG (ref 26.8–34.3)
MCHC RBC AUTO-ENTMCNC: 29.8 G/DL (ref 31.4–37.4)
MCV RBC AUTO: 96 FL (ref 82–98)
MONOCYTES # BLD AUTO: 0.56 THOUSAND/ÂΜL (ref 0.17–1.22)
MONOCYTES NFR BLD AUTO: 8 % (ref 4–12)
MV E'TISSUE VEL-LAT: 6 CM/S
MV E'TISSUE VEL-SEP: 6 CM/S
MV PEAK A VEL: 1.21 M/S
MV PEAK E VEL: 57 CM/S
MV STENOSIS PRESSURE HALF TIME: 42 MS
MV VALVE AREA P 1/2 METHOD: 5.24
NEUTROPHILS # BLD AUTO: 4.08 THOUSANDS/ÂΜL (ref 1.85–7.62)
NEUTS SEG NFR BLD AUTO: 57 % (ref 43–75)
NRBC BLD AUTO-RTO: 1 /100 WBCS
PLATELET # BLD AUTO: 304 THOUSANDS/UL (ref 149–390)
PMV BLD AUTO: 11.2 FL (ref 8.9–12.7)
POTASSIUM SERPL-SCNC: 4.6 MMOL/L (ref 3.5–5.3)
RBC # BLD AUTO: 3.41 MILLION/UL (ref 3.81–5.12)
RIGHT ATRIAL 2D VOLUME: 31 ML
RIGHT ATRIUM AREA SYSTOLE A4C: 13 CM2
RIGHT VENTRICLE ID DIMENSION: 3 CM
SL CV ECHO LV DYNAMIC OBSTRUCTION PEAK GRADIENT (VALSAL: 117 MMHG
SL CV LEFT ATRIUM LENGTH A2C: 5.2 CM
SL CV LV EF: 70
SODIUM SERPL-SCNC: 135 MMOL/L (ref 135–147)
TRICUSPID ANNULAR PLANE SYSTOLIC EXCURSION: 1.5 CM
WBC # BLD AUTO: 7.04 THOUSAND/UL (ref 4.31–10.16)

## 2023-11-12 PROCEDURE — 93306 TTE W/DOPPLER COMPLETE: CPT

## 2023-11-12 PROCEDURE — 97167 OT EVAL HIGH COMPLEX 60 MIN: CPT

## 2023-11-12 PROCEDURE — 82948 REAGENT STRIP/BLOOD GLUCOSE: CPT

## 2023-11-12 PROCEDURE — 93306 TTE W/DOPPLER COMPLETE: CPT | Performed by: INTERNAL MEDICINE

## 2023-11-12 PROCEDURE — 99232 SBSQ HOSP IP/OBS MODERATE 35: CPT | Performed by: INTERNAL MEDICINE

## 2023-11-12 PROCEDURE — 85025 COMPLETE CBC W/AUTO DIFF WBC: CPT | Performed by: INTERNAL MEDICINE

## 2023-11-12 PROCEDURE — 80048 BASIC METABOLIC PNL TOTAL CA: CPT | Performed by: INTERNAL MEDICINE

## 2023-11-12 PROCEDURE — 85730 THROMBOPLASTIN TIME PARTIAL: CPT | Performed by: INTERNAL MEDICINE

## 2023-11-12 PROCEDURE — 97163 PT EVAL HIGH COMPLEX 45 MIN: CPT

## 2023-11-12 RX ORDER — FERROUS SULFATE 325(65) MG
325 TABLET ORAL
Status: DISCONTINUED | OUTPATIENT
Start: 2023-11-13 | End: 2023-11-15 | Stop reason: HOSPADM

## 2023-11-12 RX ORDER — ACETAMINOPHEN 325 MG/1
650 TABLET ORAL EVERY 6 HOURS PRN
Status: DISCONTINUED | OUTPATIENT
Start: 2023-11-12 | End: 2023-11-15 | Stop reason: HOSPADM

## 2023-11-12 RX ORDER — SODIUM CHLORIDE 9 MG/ML
100 INJECTION, SOLUTION INTRAVENOUS CONTINUOUS
Status: DISCONTINUED | OUTPATIENT
Start: 2023-11-12 | End: 2023-11-13

## 2023-11-12 RX ADMIN — Medication 6 MG: at 21:45

## 2023-11-12 RX ADMIN — OXYBUTYNIN CHLORIDE 10 MG: 5 TABLET, EXTENDED RELEASE ORAL at 08:59

## 2023-11-12 RX ADMIN — METOPROLOL SUCCINATE ER TABLETS 25 MG: 25 TABLET, FILM COATED, EXTENDED RELEASE ORAL at 08:59

## 2023-11-12 RX ADMIN — ASPIRIN 81 MG CHEWABLE TABLET 81 MG: 81 TABLET CHEWABLE at 08:59

## 2023-11-12 RX ADMIN — OXYCODONE HYDROCHLORIDE AND ACETAMINOPHEN 2 TABLET: 5; 325 TABLET ORAL at 21:45

## 2023-11-12 RX ADMIN — LATANOPROST 1 DROP: 50 SOLUTION OPHTHALMIC at 21:46

## 2023-11-12 RX ADMIN — POLYETHYLENE GLYCOL 3350 17 G: 17 POWDER, FOR SOLUTION ORAL at 08:59

## 2023-11-12 RX ADMIN — SODIUM CHLORIDE 100 ML/HR: 0.9 INJECTION, SOLUTION INTRAVENOUS at 14:04

## 2023-11-12 RX ADMIN — ACETAMINOPHEN 650 MG: 325 TABLET, FILM COATED ORAL at 09:32

## 2023-11-12 RX ADMIN — LEVOTHYROXINE SODIUM 37.5 MCG: 75 TABLET ORAL at 08:59

## 2023-11-12 RX ADMIN — ESCITALOPRAM OXALATE 10 MG: 10 TABLET ORAL at 08:59

## 2023-11-12 RX ADMIN — PANTOPRAZOLE SODIUM 40 MG: 40 TABLET, DELAYED RELEASE ORAL at 08:59

## 2023-11-12 RX ADMIN — HEPARIN SODIUM 2600 UNITS: 1000 INJECTION INTRAVENOUS; SUBCUTANEOUS at 10:20

## 2023-11-12 RX ADMIN — MILNACIPRAN HYDROCHLORIDE 100 MG: 100 TABLET, FILM COATED ORAL at 09:00

## 2023-11-12 RX ADMIN — SENNOSIDES, DOCUSATE SODIUM 2 TABLET: 8.6; 5 TABLET ORAL at 21:45

## 2023-11-12 RX ADMIN — SODIUM CHLORIDE 100 ML/HR: 0.9 INJECTION, SOLUTION INTRAVENOUS at 21:50

## 2023-11-12 RX ADMIN — ATORVASTATIN CALCIUM 40 MG: 40 TABLET, FILM COATED ORAL at 09:05

## 2023-11-12 NOTE — PHYSICAL THERAPY NOTE
Guide removed intact.   PHYSICAL THERAPY EVALUATION NOTE          Patient Name: Mckenzie Mcfarland  FZGCT'N Date: 11/12/2023          AGE:   78 y.o. Mrn:   [id number]  ADMIT DX:  Shortness of breath [R06.02]  Chest pain [R07.9]  SOB (shortness of breath) [R06.02]  Abnormal EKG [R94.31]  SADAF (acute kidney injury) ([address]) [N17.9]    Past Medical History:  Past Medical History:   Diagnosis Date    Acid reflux     Anemia     hx of iron-deficient    Anxiety     Arthritis     Asthma     last needed inhaler last year 2020    Basal cell carcinoma     upper lip    Chronic narcotic dependence (HCC)     Chronic pain     Colon polyp     Cystocele     Diabetes mellitus ([address])     stable    Disease of thyroid gland     hypothyroidism    Diverticulosis     Dizziness     at times    Dysfunctional uterine bleeding     last assessed - [id number]    Dysphagia     Fibromyalgia     Gastric ulcer     Gastroparesis     History of colonic polyps     last assessed - [id number]    History of gastroesophageal reflux (GERD)     Hypercholesterolemia     Hyperlipidemia     Hypertension     IBS (irritable bowel syndrome)     Pneumobilia 06/18/2022    Post laminectomy syndrome     S/P insertion of spinal cord stimulator 07/18/2018    Seasonal allergies     Shortness of breath     exertional    Spinal stenosis     Status post lumbar spinal fusion 03/16/2018    Stroke [facility])     pt states slight stroke March 2022       Past Surgical History:  Past Surgical History:   Procedure Laterality Date    APPENDECTOMY      BACK SURGERY      BREAST CYST EXCISION Left     CHOLECYSTECTOMY      COLONOSCOPY      ESOPHAGOGASTRODUODENOSCOPY N/A 09/28/2016    Procedure: ESOPHAGOGASTRODUODENOSCOPY (EGD); Surgeon: Carleen Mir MD;  [facility];   Service:     HERNIA REPAIR      HYSTERECTOMY      TTAH-BSO age 25    LAMINECTOMY      LUMBAR LAMINECTOMY      OOPHORECTOMY      age 25    MI ARTHRODESIS POSTERIOR/PSTLAT TQ 1NTRSPC THORACIC N/A 2018    Procedure: Reopening of lumbar incision for T12-L5 posterior instrumented fixation and fusion and T12-L4 posterior decompression;  Surgeon: Makeda Minaya MD;  [facility];  Service: Neurosurgery    PA COLONOSCOPY FLX DX W/COLLJ SPEC WHEN PFRMD N/A 2016    Procedure: EGD AND COLONOSCOPY;  Surgeon: Rasheeda Singh MD;  Location: [facility]; Service: Gastroenterology    PA DILATION [facility] UNGUIDED SOUND/BOUGIE 1/MULT PASS N/A 2016    Procedure: DILATATION ESOPHAGEAL;  Surgeon: Rasheeda Singh MD;  [facility]; Service: Gastroenterology    PA ESOPHAGOGASTRODUODENOSCOPY TRANSORAL DIAGNOSTIC N/A 2016    Procedure: ESOPHAGOGASTRODUODENOSCOPY (EGD); Surgeon: Rasheeda Singh MD;  [facility]; Service: Gastroenterology    PA INSJ/RPLCMT SPI NPGR DIR/INDUXIVE COUPLING Left 2018    Procedure: removal of left buttock implantable pulse generator and placement of new  implantable pulse generator;  Surgeon: Makeda Minaya MD;  [facility];  Service: Neurosurgery     Length Of Stay: 2        PHYSICAL THERAPY EVALUATION:    Patient's identity confirmed via 2 patient identifiers (full name and ) at start of session       23 1156   PT Last Visit   PT Visit Date 23   Note Type   Note type Evaluation   Restrictions/Precautions   Weight Bearing Precautions Per Order No   Other Precautions Cognitive; Chair Alarm; Bed Alarm;Multiple lines; Fall Risk;Pain   Home Living   Type of [address] [place] [address] Two level;Performs ADLs on one level; Able to live on main level with bedroom/bathroom;Stairs to enter with rails  (2-3 ELLYN w/ L railing, maintains 1st floor set-up)   Bathroom Shower/Tub Tub/shower unit   Bathroom Toilet Standard   Bathroom Equipment Grab bars in shower   [address] Walker;Cane  (rollator walker)   Prior Function   Level of [place] Independent with functional mobility; Independent with ADLs; Needs assistance with IADLS   Lives With Alan Philip Help From Family; Outpatient therapy  ([facility] and OPPT 3x/wk through [facility] per pt, local crystal)   IADLs Independent with meal prep; Family/Friend/Other provides transportation; Independent with medication management  (daughters, friends, and/or [facility] provides transportation)   Falls in the last 6 months 0   Comments Pt reports at baseline she amb ind w/ her rollator walker, negotiates 3 ELLYN w/ BUE on L railing, son and family are able to assist w/ IADLs as needed   General   Additional Pertinent History Pt on IV heparin >24hrs w/ most recent APTT 60. SpO2 reading at or >90% on Dinamap post ambulation; however, unable to consistently obtain O2 and HR due to poor perfusion (attempted 2 Dinamaps and Jeremiah in room). Post ambulation, pt w/ c/o "fast" heartbeat, HR observed on tele monitor in hallway to be 79-80. RN notified post   Family/Caregiver Present No   Cognition   Overall Cognitive Status Impaired   Arousal/Participation Cooperative   Attention Attends with cues to redirect   Orientation Level Oriented X4   Memory Decreased recall of recent events;Decreased short term memory;Decreased recall of precautions   Following Commands Follows one step commands with increased time or repetition   Comments Pt ID via name and ; pt agreeable to PT eval and mobility. Pt appears to have poor insight into deficits, requires repeated VC for hand placement and locking breaks of rollator walker prior to transferring   Strength RLE   RLE Overall Strength 3+/5  (at least 3+/5, grossly assessed w/ functional mobility)   Strength LLE   LLE Overall Strength 3+/5  (at least 3+/5, grossly assessed w/ functional mobility)   Transfers   Sit to Stand 5  Supervision   Additional items Assist x 1; Armrests; Increased time required;Verbal cues   Stand to Sit 5  Supervision   Additional items Assist x 1; Armrests; Increased time required;Verbal cues   Additional Comments VC for locking of walker breaks   Ambulation/Elevation   Gait pattern Wide RUBIN; Decreased foot clearance; Short stride; Excessively slow   Gait Assistance 5  Supervision   Additional items Assist x 1;Verbal cues   Assistive Device   (pt's rollator walker)   Distance 79'   Stair Management Assistance Not tested  (pt declined stair trial at this time)   Ambulation/Elevation Additional Comments pt demonstrated ability to ambulate w/ her rollator walker, negotiate multiple obstacles and turns w/o evidence of LOB   Balance   Static Sitting Good   Dynamic Sitting Fair +   Static Standing Fair   Dynamic Standing Fair   Ambulatory Fair -  (w/ walker)   Endurance Deficit   Endurance Deficit Yes   Endurance Deficit Description decreased activity tolerance, ambulatory endurance   Activity Tolerance   Activity Tolerance Patient limited by fatigue   Medical Staff Made Aware Spoke to Claudy Pollack [address] to ARLETTE Carlos pre and post   Assessment   Prognosis Good   Problem List Decreased endurance; Impaired balance;Decreased mobility; Decreased cognition;Decreased safety awareness; Impaired judgement   Assessment Kam De La Torre is a 78 y.o. Female who [facility]10[facility]23 due to SOB and diagnosis of acute respiratory failure. Orders for PT eval and treat received, w/ activity orders of up and OOB as tolerated. Comorbidities affecting pt's functional mobility at time of evaluation include: DM, glaucoma, COPD, anemia, CVA. Personal factors affecting DC include: ambulating w/ assistive device, stairs to enter home, decreased cognition, and inability to perform IADLs. At baseline, pt mobilizes independently w/ rollator walker, and w/ 0 fall(s) in the previous 6 months. Upon evaluation, pt presents w/ the following deficits: impaired balance, impaired cognition, decreased safety awareness, decreased endurance/activity tolerance, and gait deviations.  Pt currently requires  supervision for transfers, supervision w/ rollator walker for ambulation. Pt's clinical presentation is unstable/unpredictable due to need for input for mobility technique, need to input for safety awareness, ongoing medical management. From a PT/mobility standpoint given the above findings, DC recommendation is level: III (Minimum Rehab Resource Intensity). During current admission, pt will benefit from continued skilled inpatient PT in the acute care setting in order to address the above deficits and to maximize function and mobility prior to DC from acute care. Goals   Patient Goals to rest   STG Expiration Date 11/22/23   Short Term Goal #1 Pt will: perform bed mobility w/ mod I to decrease pt's burden of care and increase pt's independence w/ repositioning in bed; perform transfers w/ mod I to promote increased OOB mobility; ambulate at least 150' w/ rollator walker and mod I to increase pt's ambulatory endurance/tolerance; negotiate at least 3 stair(s) w/ UE support and supervision to facilitate pt returning to previous living environment; increase all balance ratings by at least 1 grade to decrease pt's risk of falls   PT Treatment Day 0   Plan   Treatment/Interventions Functional transfer training;LE strengthening/ROM; Elevations; Therapeutic exercise; Endurance training;Cognitive reorientation;Patient/family training;Equipment eval/education; Bed mobility;Gait training; Compensatory technique education   PT Frequency 3-5x/wk   Discharge Recommendation   Rehab Resource Intensity Level, PT III (Minimum Resource Intensity)   AM-PAC Basic Mobility Inpatient   Turning in Flat Bed Without Bedrails 3   Lying on Back to Sitting on Edge of Flat Bed Without Bedrails 3   Moving Bed to Chair 3   Standing Up From Chair Using Arms 3   Walk in Room 3   Climb 3-5 Stairs With Railing 3   Basic Mobility Inpatient Raw Score 18   Basic Mobility Standardized Score 41.05   Highest Level Of Mobility   JH-HLM Goal 6: Walk 10 steps or more   JH-HLM Achieved 7: Walk 25 feet or more End of Consult   Patient Position at End of Consult Bedside chair;Bed/Chair alarm activated; All needs within reach       The patient's AM-PAC Basic Mobility Inpatient Short Form Raw Score is 18. A Raw score of greater than 16 suggests the patient may benefit from discharge to home. Please also refer to the recommendation of the Physical Therapist for safe discharge planning.     Pt will benefit from skilled inpatient PT during this admission in order to facilitate progress towards goals and to maximize functional independence prior to [address] [facility] rec: level III (Minimum Rehab Resource Intensity)        Nadine [place], PT, DPT  11/12/23

## 2023-11-12 NOTE — UTILIZATION REVIEW
NOTIFICATION OF INPATIENT ADMISSION   AUTHORIZATION REQUEST   SERVICING FACILITY:   [address] [place] [address]  Tax ID: [id number]  NPI: [id number]   ATTENDING PROVIDER:  Attending Name and NPI#: Tracee Paulino Md [[id number]]  Address: [address] [place] [address], [address] [place] [address]  Phone: [phone]     ADMISSION INFORMATION:  Place of Service: [address]patient [address]  Place of Service Code: 21  Inpatient Admission Date/Time: 11/10/23  4:02 PM  Discharge Date/Time: No discharge date for patient encounter. Admitting Diagnosis Code/Description:  Shortness of breath [R06.02]  Chest pain [R07.9]  SOB (shortness of breath) [R06.02]  Abnormal EKG [R94.31]  SADAF (acute kidney injury) ([address]) [N17.9]     UTILIZATION REVIEW CONTACT:  Fawad Georges Utilization   Network Utilization Review Department  Phone: [phone]  Fax: [phone]  Email: [email or web address]  Contact for approvals/pending authorizations, clinical reviews, and discharge. PHYSICIAN ADVISORY SERVICES:  Medical Necessity Denial & Kefi-te-Flvl Review  Phone: [phone]  Fax: [phone]  Email: [email or web address]     DISCHARGE SUPPORT TEAM:  For Patients Discharge Needs & Updates  Phone: [phone] opt. 2 Fax: [phone]  Email: [email or web address]

## 2023-11-12 NOTE — ASSESSMENT & PLAN NOTE
Hb 10.4, within baseline on 10-11, MCV normal. Likely anemia of chronic disease vs mixed anemia (micro+macro with averaging to normal MCV), no recent iron panel, B12, folate.  Prior iron and ferritin low 3 years ago  B12 and Folate  wnl, iron-31, TIBC 331, iron saturation 9 , most likely iron deficiency anemia  Lab Results   Component Value Date    HGB 9.7 (L) 11/12/2023    HGB 10.3 (L) 11/11/2023    HGB 10.4 (L) 11/10/2023    HGB 10.2 (L) 09/15/2023        Plan  Can start iron supplement   Bowel regime to avoid constipation

## 2023-11-12 NOTE — ASSESSMENT & PLAN NOTE
Lab Results   Component Value Date    HGBA1C 6.6 (H) 06/09/2023       Recent Labs     11/11/23  1615 11/11/23  2057 11/12/23  0755 11/12/23  1120   POCGLU 161* 152* 126 142*       Blood Sugar Average: Last 72 hrs:  (P) 145.25  Well-controlled.  Hold PTA metformin, initiate sliding scale  Accu-Chek

## 2023-11-12 NOTE — CASE MANAGEMENT
Case Management Assessment & Discharge Planning Note    Patient name Augustine Quesada  Location S /S -42 MRN [id number]  : 1945 Date 2023       Current Admission Date: 11/10/2023  Current Admission Diagnosis:Acute respiratory failure   Patient Active Problem List    Diagnosis Date Noted    Acute respiratory failure 11/10/2023    History of lumbar surgery 11/10/2023    Abnormal urinalysis 2023    Chronic obstructive pulmonary disease, unspecified COPD type ([address]) 2023    Confusion with body shakes and blank stare 2023    Normocytic anemia 2023    Bilateral lower extremity edema 2023    Continuous opioid dependence ([address]) 2022    Cerebrovascular accident (CVA), unspecified mechanism ([address]) 2022    Ambulatory dysfunction 2022    Anemia 2022    Acute kidney injury ([address]) 2022    Obesity, morbid ([address]) 2021    Prepyloric ulcer 2021    Diarrhea 2021    Mild intermittent asthma without complication     Iron deficiency anemia secondary to inadequate dietary iron intake 2018    Type 2 diabetes mellitus ([address])     Failed back surgical syndrome 2018    Hypothyroidism 2017    Degenerative lumbar spinal stenosis 2017    Glaucoma 2017    Overactive bladder 2016    Dyspepsia 2016    Hypercholesterolemia 2016    Anxiety 2015    Chronic pain disorder 2013    Hypertension 2013      LOS (days): 2  Geometric Mean LOS (GMLOS) (days):   Days to GMLOS:     OBJECTIVE:    Risk of Unplanned Readmission Score: 24.76         Current admission status: Inpatient       Preferred Pharmacy:   [address] [place] [address] [address] [facility]  Phone: [phone] Fax: [phone]    [facility] [address] [address]  Phone: [phone] Fax: [phone]    [address] [address] [address]  [address] [facility] [address]  [address] [address]  Phone: [phone] Fax: [phone]    [facility] [address] [place] [address], [address] [place] [address]  Phone: [phone] Fax: [phone]    Primary Care Provider: NANY Spence    Primary Insurance: [facility] [address] [facility]  Secondary Insurance:     ASSESSMENT:  [place] Proxies    There are no active Health Care Proxies on file. Readmission Root Cause  30 Day Readmission: No    Patient Information  Admitted from[de-identified] Home  Mental Status: Alert  During Assessment patient was accompanied by: Not accompanied during assessment  Assessment information provided by[de-identified] Patient  Primary Caregiver: Self  Support Systems: Self, Children, Friend, Family members, Daughter, [place] of Residence: [address] [place] [address] do you live in?: [facility] entry access options.  Select all that apply.: Stairs  Number of steps to enter home.: 4  Do the steps have railings?: Yes  Type of Current Residence: Apartment  Floor Level: 1  Upon entering residence, is there a bedroom on the main floor (no further steps)?: Yes  Upon entering residence, is there a bathroom on the main floor (no further steps)?: Yes  In the last 12 months, was there a time when you were not able to pay the mortgage or rent on time?: No  In the last 12 months, how many places have you lived?: 1  In the last 12 months, was there a time when you did not have a steady place to sleep or slept in a shelter (including now)?: No  Homeless/housing insecurity resource given?: N/A  Living Arrangements: Lives w/ Son  Is patient a ?: No    Activities of Daily Living Prior to Admission  Functional Status: Independent  Completes ADLs independently?: Yes  Ambulates independently?: Yes  Does patient use assisted devices?: Yes  Assisted Devices (DME) used: Rollator, Shower Chair  Does patient currently own DME?: Yes  What DME does the patient currently own?: Sarkis Garcia  Does patient have a history of Outpatient Therapy (PT/OT)?: Yes  Does the patient have a history of Short-Term Rehab?: No  Does patient have a history of HHC?: Yes (Through [facility])  Does patient currently have [facility]?: No         Patient Information Continued  Income Source: Pension/USP  Does patient have prescription coverage?: Yes  Within the past 12 months, you worried that your food would run out before you got the money to buy more.: Never true  Within the past 12 months, the food you bought just didn't last and you didn't have money to get more.: Never true  Food insecurity resource given?: N/A  Does patient receive dialysis treatments?: No  Does patient have a history of substance abuse?: No  Does patient have a history of Mental Health Diagnosis?: No         Means of Transportation  Means of Transport to Appts[de-identified] Other (Comment) ([facility])  In the past 12 months, has lack of transportation kept you from medical appointments or from getting medications?: No  In the past 12 months, has lack of transportation kept you from meetings, work, or from getting things needed for daily living?: No  Was application for public transport provided?: N/A        DISCHARGE DETAILS:    Discharge planning discussed with[de-identified] Patient  Freedom of Choice: Yes  Comments - Freedom of Choice: CM discussed discharge plans per care team recommendations - patient in agreement with [facility] - CM to contact [facility] on Monday to inform of [facility] needs at discharge.   CM contacted family/caregiver?: Yes  Were Treatment Team discharge recommendations reviewed with patient/caregiver?: Yes  Did patient/caregiver verbalize understanding of patient care needs?: Yes       Contacts  Patient Contacts: Karley Markymiguel angelet, daughter  Relationship to Patient[de-identified] Family  Contact Method: Phone  Phone Number: See face sheet  Reason/Outcome: Discharge [address]         Is the patient interested in [address] at discharge?: Yes ([facility] will arrange services at home)    DME Referral Provided  Referral made for DME?: No    Other Referral/Resources/Interventions Provided:  Interventions: [facility]  Referral Comments: [facility] to contact [facility] on Monday, 11/13 to inform of [facility]  [address] needs.     Would you like to participate in our [facility] service program?  : No - Declined    Treatment Team Recommendation: Home with [facility]  Discharge Destination Plan[de-identified] Home with [facility]

## 2023-11-12 NOTE — PLAN OF CARE
Problem: OCCUPATIONAL THERAPY ADULT  Goal: Performs self-care activities at highest level of function for planned discharge setting. See evaluation for individualized goals. Description: Treatment Interventions: ADL retraining, Functional transfer training, Endurance training, Cognitive reorientation, Patient/family training, Equipment evaluation/education, Compensatory technique education, Activityengagement          See flowsheet documentation for full assessment, interventions and recommendations. Note: Limitation: Decreased ADL status, Decreased Safe judgement during ADL, Decreased cognition, Decreased endurance, Decreased self-care trans, Decreased high-level ADLs (impaired pain, balance, fxnl mobility, act lila, standing lila, strength, attention to task, safety awareness, insight, problem solving)  Prognosis: Good  Assessment: Pt is a 78 y.o. female seen for OT evaluation s/p admission to [facility] on 11/10/2023 due to Shortness of breath. Personal and env factors supporting pt at time of IE include accessible home environment, [address], and (I) with ADLs, shares IADLs, supportive family . Personal and env factors inhibiting engagement in occupations include advanced age and difficulty completing IADLs. Performance deficits that affect the pt’s occupational performance can be seen above. Due to pt's current functional limitations and medical complications pt is functioning below baseline. Pt would benefit from continued skilled OT treatment in order to maximize safety, independence and overall performance with ADLs, functional mobility, functional transfers, and cognition in order to achieve highest level of function.      Rehab Resource Intensity Level, OT: III (Minimum Resource Intensity)

## 2023-11-12 NOTE — PROGRESS NOTES
[address]  Progress Note  Name: Hermelindo Vu  MRN: [id number]  Unit/Bed#: S -01 I Date of Admission: 11/10/2023   Date of Service: 11/13/2023 I Hospital Day: 3    Assessment/Plan   * Acute respiratory failure  Assessment & Plan  Patient presents from PCP office with chest pressure, SOB, lightheadedness and hypoxia with exertion, and tachycardia  At rest in the ED O2Sat mid 90s on RA. Desat to high 80s with ambulation and tachycardia  EKG sinus tachycardia with T wave inversion in lead III, prolonged Qtc  Last echo 2/23 showing 65% EF and normal systolic function. No aortic stenosis  CXR normal per my read. History of tobacco use, quit 40 years ago  History of falls in the past 3 months with unknown etiology. Seen by ENT in 9/23 - likely medication induced vs vascular issue. Vestibular therapy recommended  Elevated D-dimer 0.86. Troponin negative, BNP 25  SADAF with Cr 1.48 on admission  (baseline 0.8-1)  Creatinine improved 1.26   Patient gets chest pressure and desaturates when ambulating   No evidence of acute or chronic deep vein thrombosis VAS lower limb   Echo: LVEF 70%,LVOT obstruction noted with Valsalva, but not at rest.     Plan  Continue Heparin gtt currently  V/Q scan for PE rule out today   cardiology consulted due to chest pressure with ambulation and recommendations appreciated  Possible Cardiac Cath on Tuesday  if no PE but will need Contrast allergy prep for cardiac cath tomorrow  Ambulatory pulse ox  Monitor on telemetry      Acute kidney injury [facility])  Assessment & Plan  POA Cr 1.48 with baseline 0.8-1. Possible due to decreased PO intake per daughter report    Lab Results   Component Value Date    CREATININE 0.83 11/13/2023    CREATININE 1.13 11/12/2023    CREATININE 1.26 11/11/2023    CREATININE 1.48 (H) 11/10/2023      Plan  Encourage PO intake  Hold PTA lisinopril in setting of SADAF  Repeat BMP.  Replete electrolytes prn    Hypertension  Assessment & Plan  Blood Pressure: 162/74   Continue PTA metoprolol  Hold lisinopril in setting of SADAF    History of lumbar surgery  Assessment & Plan  Prior thoracic and lumbar surgery, prior laminectomy, spinal cord stimulator in place (non-functional per daughter)    Plan  Continue PTA Percocet 10/325 mg Q12 prn  Continue PTA Savella  Continue PTA gabapentin 300 mg hs and baclofen prn    Ambulatory dysfunction  Assessment & Plan  Ambulates with walker at home. Frequent falls recently with admission on 8/26  PT/OT: recs Level 3    Plan  Orthostatic vital signs  Continue meclizine prn for dizziness    Anemia  Assessment & Plan  Hb 10.4, within baseline on 10-11, MCV normal. Likely anemia of chronic disease vs mixed anemia (micro+macro with averaging to normal MCV), no recent iron panel, B12, folate. Prior iron and ferritin low 3 years ago  B12 and Folate  wnl, iron-31, TIBC 331, iron saturation 9 , most likely iron deficiency anemia  Lab Results   Component Value Date    HGB 8.0 (L) 11/13/2023    HGB 9.7 (L) 11/12/2023    HGB 10.3 (L) 11/11/2023    HGB 10.4 (L) 11/10/2023        Plan  Started Ferrous sulfate 325 mg daily  Bowel regime to avoid constipation     Overactive bladder  Assessment & Plan  Start oxybutynin, replacement for home trospium per formulary    Hypothyroidism  Assessment & Plan  Continue PTA levothyroxine    Glaucoma  Assessment & Plan  Continue PTA latanoprost    Type 2 diabetes mellitus [facility])  Assessment & Plan  Lab Results   Component Value Date    HGBA1C 6.6 (H) 06/09/2023       Recent Labs     11/12/23  1120 11/12/23  1636 11/12/23  2109 11/13/23  0814   POCGLU 142* 136 147* 108       Blood Sugar Average: Last 72 hrs:  (P) [id number]  Well-controlled. Hold PTA metformin, initiate sliding scale  Accu-Chek             VTE Pharmacologic Prophylaxis: VTE Score: 6 High Risk (Score >/= 5) - Pharmacological DVT Prophylaxis Ordered: heparin drip. Sequential Compression Devices Ordered.     Patient Centered Rounds: I performed bedside rounds with nursing staff today. Discussions with Specialists or Other Care Team Provider: Cardiology    Education and Discussions with Family / Patient: Attempted to update  (daughter) via phone. Left voicemail. Pt's daughter is a doctor ( Family [employer]). Current Length of Stay: 3 day(s)  Current Patient Status: Inpatient   Discharge Plan: Anticipate discharge in 48-72 hrs to home. Code Status: Level 1 - Full Code    Subjective:   I have examined the patient bedside this morning. Overnight: No ON events . She is AAOx3, denies any headache, CP, SOB, abdominal pain, N/V/D/C, urinary symptoms. Reports abdominal pressure d/t constipation. Last BM before admission as per patient and UO is normal. Pt is hemodynamical stable. She understands that she will have the VQ scan to rule out PE. The cardiology is planning to have the cardiac cath likely on Tuesday. Objective:     Vitals:   Temp (24hrs), Av.2 °F (36.8 °C), Min:98 °F (36.7 °C), Max:98.3 °F (36.8 °C)    Temp:  [98 °F (36.7 °C)-98.3 °F (36.8 °C)] 98.3 °F (36.8 °C)  HR:  [72-85] 72  Resp:  [18] 18  BP: (126-176)/(60-92) 162/74  SpO2:  [97 %] 97 %  Body mass index is 29.88 kg/m². Input and Output Summary (last 24 hours): Intake/Output Summary (Last 24 hours) at 2023 0843  Last data filed at 2023 1801  Gross per 24 hour   Intake 800 ml   Output 550 ml   Net 250 ml       Physical Exam:   Physical Exam  Vitals and nursing note reviewed. Constitutional:       General: She is not in acute distress. Appearance: She is well-developed. She is not toxic-appearing. Cardiovascular:      Rate and Rhythm: Normal rate and regular rhythm. Heart sounds: Murmur heard. Systolic murmur is present with a grade of 3/6. Pulmonary:      Effort: Pulmonary effort is normal. No respiratory distress. Breath sounds: Normal breath sounds. No wheezing or rales.    Abdominal:      General: Bowel sounds are normal. There is no distension. Palpations: Abdomen is soft. Tenderness: There is no abdominal tenderness. Musculoskeletal:      Right lower leg: No edema. Left lower leg: No edema. Neurological:      Mental Status: She is alert and oriented to person, place, and time. Psychiatric:         Mood and Affect: Mood normal.          Additional Data:     Labs:  Results from last 7 days   Lab Units 11/13/23  0343 11/12/23  0917   WBC Thousand/uL 5.93 7.04   HEMOGLOBIN g/dL 8.0* 9.7*   HEMATOCRIT % 26.4* 32.6*   PLATELETS Thousands/uL 178 304   NEUTROS PCT %  --  57   LYMPHS PCT %  --  26   MONOS PCT %  --  8   EOS PCT %  --  7*     Results from last 7 days   Lab Units 11/13/23  0343 11/11/23  0833 11/10/23  1332   SODIUM mmol/L 139   < > 136   POTASSIUM mmol/L 4.0   < > 4.6   CHLORIDE mmol/L 113*   < > 101   CO2 mmol/L 21   < > 25   BUN mg/dL 16   < > 33*   CREATININE mg/dL 0.83   < > 1.48*   ANION GAP mmol/L 5   < > 10   CALCIUM mg/dL 7.1*   < > 9.3   ALBUMIN g/dL  --   --  3.7   TOTAL BILIRUBIN mg/dL  --   --  0.28   ALK PHOS U/L  --   --  84   ALT U/L  --   --  8   AST U/L  --   --  14   GLUCOSE RANDOM mg/dL 106   < > 169*    < > = values in this interval not displayed.      Results from last 7 days   Lab Units 11/10/23  1332   INR  0.90     Results from last 7 days   Lab Units 11/13/23  0814 11/12/23  2109 11/12/23  1636 11/12/23  1120 11/12/23  0755 11/11/23  2057 11/11/23  1615 11/11/23  1224 11/11/23  0823 11/10/23  2308 11/10/23  1749   POC GLUCOSE mg/dl 108 147* 136 142* 126 152* 161* 167* 133 139 142*               Lines/Drains:  Invasive Devices       Peripheral Intravenous Line  Duration             Peripheral IV 11/10/23 Distal;Left;Upper;Ventral (anterior) Arm 2 days    Peripheral IV 11/10/23 Proximal;Right;Ventral (anterior) Antecubital 2 days                      Telemetry:  Telemetry Orders (From admission, onward)               24 Hour Telemetry Monitoring  Continuous x 24 Hours (Telem)        Comments: Currently PE rule out   Question:  Reason for 24 Hour Telemetry  Answer:  Pulmonary Embolism                     Telemetry Reviewed: Normal Sinus Rhythm  Indication for Continued Telemetry Use: PE      Imaging: Reviewed radiology reports from this admission including: ultrasound(s)  VAS lower limb venous duplex study, complete bilateral   Final Result by Reina Cheadle, MD (11/11 1226)      XR chest 2 views   ED Interpretation by Bubba Lay MD (11/10 1435)   CXR with no acute cardiopulmonary abnormalities. Final Result by Priscilla Aguilar MD (11/10 1630)      No acute cardiopulmonary disease.                Workstation performed: [id number]         NM inpatient order    (Results Pending)       Recent Cultures (last 7 days):         Last 24 Hours Medication List:   Current Facility-Administered Medications   Medication Dose Route Frequency Provider Last Rate    acetaminophen  650 mg Oral Q6H PRN Amy Batista MD      aluminum-magnesium hydroxide-simethicone  30 mL Oral Q4H PRN Kenya [place], DO      aspirin  81 mg Oral Daily Jennie Doran MD      atorvastatin  40 mg Oral Daily Jennie Doran MD      baclofen  10 mg Oral BID PRN Jennie Doran MD      bisacodyl  10 mg Rectal Daily PRN Yael Riley MD      escitalopram  10 mg Oral Daily Jennie Doran MD      ferrous sulfate  325 mg Oral Daily With Breakfast May Emilia Roland MD      gabapentin  300 mg Oral HS Jennie Doran MD      heparin (porcine)  3-30 Units/kg/hr (Order-Specific) Intravenous Titrated Bubba Lay MD 15 Units/kg/hr (11/13/23 0239)    heparin (porcine)  2,600 Units Intravenous Q6H PRN Bubba Lay MD      heparin (porcine)  5,200 Units Intravenous Q6H PRN Bubba Lay MD      insulin lispro  1-5 Units Subcutaneous 4x Daily (AC & HS) Jennie Doran MD      latanoprost  1 drop Both Eyes HS Jennie Doran MD      levothyroxine  37.5 mcg Oral Daily Jennie Doran MD      magnesium sulfate  2 g Intravenous Once Gustabo Chet Newman MD      meclizine  12.5 mg Oral Q8H PRN Priscilla Rainey MD      melatonin  6 mg Oral HS Isarua Reilly DO      metoprolol succinate  25 mg Oral Daily Priscilla Rainey MD      Milnacipran HCl  1 tablet Oral Daily Priscilla Rainey MD      oxybutynin  10 mg Oral Daily Priscilla Rainey MD      oxyCODONE-acetaminophen  2 tablet Oral Q12H PRN Priscilla Rainey MD      pantoprazole  40 mg Oral Daily Priscilla Rainey MD      polyethylene glycol  17 g Oral Daily Jeremiah King MD      senna-docusate sodium  2 tablet Oral HS Priscilla Rainey MD             Today, Patient Was Seen By: Ángel Galindo MD    **Please Note: This note may have been constructed using a voice recognition system. **

## 2023-11-12 NOTE — PROGRESS NOTES
Progress Note - Cardiology   Jonathan Lim 78 y.o. female MRN: [id number]  Unit/Bed#: S -01 Encounter: [id number]    Assessment:  Principal Problem:    Acute respiratory failure  Active Problems:    Type 2 diabetes mellitus ([address])    Glaucoma    Hypertension    Hypothyroidism    Overactive bladder    Acute kidney injury ([address])    Anemia    Ambulatory dysfunction    History of lumbar surgery    64-year-old female who presents with exertional shortness of breath. There is concern for pulmonary embolism. Because creatinine was elevated, VQ scan is planned. Features also could be consistent with angina. CAT scan done in August did show calcification of the LAD. There was a murmur on auscultation. Prior echocardiogram did not show any significant valvular disease but was a poor study when I personally reviewed it. Today's echocardiogram does show LVOT obstruction with Valsalva but not at rest. The cavity appears underfilled to me. I see no clear systolic anterior motion of the mitral valve but again, the images are not ideal. Other features of HCM are not present. Plan:    Continue with previous plan for VQ scan to rule out PE. If negative, proceed with left heart catheterization. Given the LVOT obstruction which seems to be secondary to a small, underfilled left ventricle, I will give her IV fluid today. She has a contrast allergy and will need pretreatment if she is going to get a dye load. Subjective/Objective     Subjective: At rest, she feels okay. When she walks or ambulates to the bathroom, she feels dizzy. Echocardiogram was done today and personally reviewed. There is LVOT obstruction, but not of the features of HCM. It appears the cavity is small and underfilled.   RV was normal.    Objective:    Vitals: /60 (BP Location: Right arm, Patient Position: Supine, Cuff Size: Standard)   Pulse 79   Temp (!) 97.2 °F (36.2 °C) (Oral)   Resp 18   Ht 5' (1.524 m)   Wt 69.4 kg (153 lb)   LMP  (LMP Unknown)   SpO2 99%   BMI 29.88 kg/m²   Vitals:    11/12/23 0950   Weight: 69.4 kg (153 lb)     Orthostatic Blood Pressures      Flowsheet Row Most Recent Value   Blood Pressure 126/60 filed at 11/12/2023 4260   Patient Position - Orthostatic VS Lying filed at 11/12/2023 0732              Intake/Output Summary (Last 24 hours) at 11/12/2023 1346  Last data filed at 11/11/2023 1758  Gross per 24 hour   Intake 0 ml   Output --   Net 0 ml     Physical Exam:   General appearance: alert and in no acute distress  Head: Normocephalic, without obvious abnormality, atraumatic  Neck: no carotid bruit, no JVD and supple, symmetrical, trachea midline  Lungs: clear to auscultation bilaterally. Normal air entry. Normal effort. Heart: S1, S2 soft murmur at rest.  Abdomen: soft, non-tender; bowel sounds normal; no masses,  no organomegaly  Extremities: extremities normal, atraumatic, no cyanosis or edema  Pulses: symmetric bilaterally  Skin: Skin color, texture, turgor normal. No rashes or lesions  Neurologic: Grossly normal. Alert and oriented.     Medications:    Current Facility-Administered Medications:     acetaminophen (TYLENOL) tablet 650 mg, 650 mg, Oral, Q6H PRN, Shiloh Saavedra MD, 650 mg at 11/12/23 0932    aluminum-magnesium hydroxide-simethicone (MAALOX) oral suspension 30 mL, 30 mL, Oral, Q4H PRN, Kenya [place], DO, 30 mL at 11/11/23 1006    aspirin chewable tablet 81 mg, 81 mg, Oral, Daily, Jennie Doran MD, 81 mg at 11/12/23 0859    atorvastatin (LIPITOR) tablet 40 mg, 40 mg, Oral, Daily, Jennie Doran MD, 40 mg at 11/12/23 3477    baclofen tablet 10 mg, 10 mg, Oral, BID PRN, Jennie Doran MD    escitalopram (LEXAPRO) tablet 10 mg, 10 mg, Oral, Daily, Jennie Doran MD, 10 mg at 11/12/23 0859    gabapentin (NEURONTIN) capsule 300 mg, 300 mg, Oral, HS, Jennie Doran MD    heparin (porcine) 25,000 units in 0.45% NaCl 250 mL infusion (premix), 3-30 Units/kg/hr (Order-Specific), Intravenous, Titrated, Foster Terence Jen Jaffe MD, Last Rate: 8.5 mL/hr at 11/12/23 1021, 13 Units/kg/hr at 11/12/23 1021    heparin (porcine) injection 2,600 Units, 2,600 Units, Intravenous, Q6H PRN, Rojas Page MD, 2,600 Units at 11/12/23 1020    heparin (porcine) injection 5,200 Units, 5,200 Units, Intravenous, Q6H PRN, Rojas Page MD    insulin lispro (HumaLOG) 100 units/mL subcutaneous injection 1-5 Units, 1-5 Units, Subcutaneous, 4x Daily (AC & HS), 1 Units at 11/11/23 2121 **AND** Fingerstick Glucose (POCT), , , 4x Daily AC and at bedtime, Patsy Dominguez MD    latanoprost (XALATAN) 0.005 % ophthalmic solution 1 drop, 1 drop, Both Eyes, HS, Patsy Dominguez MD, 1 drop at 11/11/23 2119    levothyroxine tablet 37.5 mcg, 37.5 mcg, Oral, Daily, Patsy Dominguez MD, 37.5 mcg at 11/12/23 0859    meclizine (ANTIVERT) tablet 12.5 mg, 12.5 mg, Oral, Q8H PRN, Patsy Dominguez MD    melatonin tablet 6 mg, 6 mg, Oral, HS, Fadi Moros, DO, 6 mg at 11/11/23 2118    metoprolol succinate (TOPROL-XL) 24 hr tablet 25 mg, 25 mg, Oral, Daily, Patsy Dominguez MD, 25 mg at 11/12/23 2648    Milnacipran HCl TABS 100 mg, 1 tablet, Oral, Daily, Patsy Dominguez MD, 100 mg at 11/12/23 0900    oxybutynin (DITROPAN-XL) 24 hr tablet 10 mg, 10 mg, Oral, Daily, Patsy Dominguez MD, 10 mg at 11/12/23 0859    oxyCODONE-acetaminophen (PERCOCET) 5-325 mg per tablet 2 tablet, 2 tablet, Oral, Q12H PRN, Patsy Dominguez MD, 2 tablet at 11/11/23 2037    pantoprazole (PROTONIX) EC tablet 40 mg, 40 mg, Oral, Daily, Patsy Dominguez MD, 40 mg at 11/12/23 0859    polyethylene glycol (MIRALAX) packet 17 g, 17 g, Oral, Daily, Ashlyn Lynn MD, 17 g at 11/12/23 0859    senna-docusate sodium (SENOKOT S) 8.6-50 mg per tablet 2 tablet, 2 tablet, Oral, HS, Patsy Dominguez MD, 2 tablet at 11/11/23 2118    sodium chloride 0.9 % infusion, 100 mL/hr, Intravenous, Continuous, Yenny Lorenz MD    Lab Results:      Results from last 7 days   Lab Units 11/12/23  0917 11/11/23  0833 11/10/23  2240 11/10/23  1332   WBC Thousand/uL 7.04 8.36 --  9.55   HEMOGLOBIN g/dL 9.7* 10.3*  --  10.4*   HEMATOCRIT % 32.6* 33.8*  --  33.6*   PLATELETS Thousands/uL 304 265 281 263     Results from last 7 days   Lab Units 11/11/23  0833   TRIGLYCERIDES mg/dL 84   HDL mg/dL 50     Results from last 7 days   Lab Units 11/12/23  0917 11/11/23  0833 11/10/23  1332   SODIUM mmol/L 135 138 136   POTASSIUM mmol/L 4.6 4.4 4.6   CHLORIDE mmol/L 106 104 101   CO2 mmol/L 25 26 25   BUN mg/dL 23 27* 33*   CREATININE mg/dL 1.13 1.26 1.48*   CALCIUM mg/dL 9.0 9.1 9.3   ALK PHOS U/L  --   --  84   ALT U/L  --   --  8   AST U/L  --   --  14     Results from last 7 days   Lab Units 11/12/23  0917 11/11/23  2312 11/11/23  1616 11/10/23  2240 11/10/23  1332   INR   --   --   --   --  0.90   PTT seconds 48* 60* 94*   < > 28    < > = values in this interval not displayed. Results from last 7 days   Lab Units 11/11/23  0833   MAGNESIUM mg/dL 1.5*       Telemetry: Personally reviewed. Rare PSVT    Echo:  Left Ventricle: Wall thickness is not well visualized. The left ventricular ejection fraction is 70%. Systolic function is hyperdynamic. Although no diagnostic regional wall motion abnormality was identified, this possibility cannot be completely excluded on the basis of this study. Diastolic function is mildly abnormal, consistent with grade I (abnormal) relaxation. There is outflow tract dynamic obstruction with valsalva with a peak gradient of 117 mmHg. Right Ventricle: Right ventricular cavity size is normal. Systolic function is normal.    Left Atrium: The atrium is mildly dilated. Mitral Valve: There is mild annular calcification. LVOT obstruction noted with Valsalva, but not at rest. No other typical features of HCM are seen. LV appears small, underfilled.

## 2023-11-12 NOTE — PROGRESS NOTES
[facility]  Progress Note  Name: Anselmo De La Cruz  MRN: [id number]  Unit/Bed#: S -01 I Date of Admission: 11/10/2023   Date of Service: 11/12/2023 I Hospital Day: 2    Assessment/Plan   * Acute respiratory failure  Assessment & Plan  Patient presents from PCP office with chest pressure, SOB, lightheadedness and hypoxia with exertion, and tachycardia  At rest in the ED O2Sat mid 90s on RA. Desat to high 80s with ambulation and tachycardia  EKG sinus tachycardia with T wave inversion in lead III, prolonged Qtc  Last echo 2/23 showing 65% EF and normal systolic function. No aortic stenosis  CXR normal per my read. History of tobacco use, quit 40 years ago  History of falls in the past 3 months with unknown etiology. Seen by ENT in 9/23 - likely medication induced vs vascular issue. Vestibular therapy recommended  Elevated D-dimer 0.86. Troponin negative, BNP 25  SADAF with Cr 1.48 on admission  (baseline 0.8-1)  Creatinine improved 1.26   Patient gets chest pressure and desaturates when ambulating   No evidence of acute or chronic deep vein thrombosis VAS lower limb   Recheck echocardiogram yesterday revealed LVEF 70%    Plan  Continue Heparin gtt currently  V/Q scan for PE rule out ordered for Monday  Cardiology consulted due to chest pressure with ambulation and recommendations appreciated  Possible Cardiac Cath on Tuesday  if no PE but will need Contrast allergy prep for Monday    Ambulatory pulse ox  Monitor on telemetry      History of lumbar surgery  Assessment & Plan  Prior thoracic and lumbar surgery, prior laminectomy, spinal cord stimulator in place (non-functional per daughter)    Plan  Continue PTA Percocet 10/325 mg Q12 prn  Continue PTA Savella  Continue PTA gabapentin 300 mg hs and baclofen prn    Ambulatory dysfunction  Assessment & Plan  Ambulates with walker at home.  Frequent falls recently with admission on 8/26    Plan  PT/OT eval and treat  Orthostatic vital signs  Continue meclizine prn for dizziness    Anemia  Assessment & Plan  Hb 10.4, within baseline on 10-11, MCV normal. Likely anemia of chronic disease vs mixed anemia (micro+macro with averaging to normal MCV), no recent iron panel, B12, folate. Prior iron and ferritin low 3 years ago  B12 and Folate  wnl, iron-31, TIBC 331, iron saturation 9 , most likely iron deficiency anemia  Lab Results   Component Value Date    HGB 9.7 (L) 11/12/2023    HGB 10.3 (L) 11/11/2023    HGB 10.4 (L) 11/10/2023    HGB 10.2 (L) 09/15/2023        Plan  Can start iron supplement   Bowel regime to avoid constipation     Acute kidney injury ([address])  Assessment & Plan  POA Cr 1.48 with baseline 0.8-1. Possible due to decreased PO intake per daughter report    Lab Results   Component Value Date    CREATININE 1.13 11/12/2023    CREATININE 1.26 11/11/2023    CREATININE 1.48 (H) 11/10/2023    CREATININE 0.92 08/28/2023      Plan    Encourage PO intake  Hold PTA lisinopril in setting of SADAF  Repeat BMP. Replete electrolytes prn    Overactive bladder  Assessment & Plan  Start oxybutynin, replacement for home trospium per formulary    Hypothyroidism  Assessment & Plan  Continue PTA levothyroxine    Hypertension  Assessment & Plan  Continue PTA metoprolol  Hold lisinopril in setting of SADAF    Glaucoma  Assessment & Plan  Continue PTA latanoprost    Type 2 diabetes mellitus [facility])  Assessment & Plan  Lab Results   Component Value Date    HGBA1C 6.6 (H) 06/09/2023       Recent Labs     11/11/23  1615 11/11/23  2057 11/12/23  0755 11/12/23  1120   POCGLU 161* 152* 126 142*       Blood Sugar Average: Last 72 hrs:  (P) 145.25  Well-controlled. Hold PTA metformin, initiate sliding scale  Accu-Chek             VTE Pharmacologic Prophylaxis: VTE Score: 6 High Risk (Score >/= 5) - Pharmacological DVT Prophylaxis Ordered: heparin drip. Sequential Compression Devices Ordered.     Patient Centered Rounds: I performed bedside rounds with nursing staff today.  Discussions with Specialists or Other Care Team Provider: Cardiology    Education and Discussions with Family / Patient: Updated  (daughter) via phone. Pt's daughter is a doctor ( Family [employer]). Current Length of Stay: 2 day(s)  Current Patient Status: Inpatient   Discharge Plan: Anticipate discharge in 48-72 hrs to home. Code Status: Level 1 - Full Code    Subjective:   Patient was seen and examined at bedside this morning. She was sitting in the recliner chair and receiving heparin drip. She denied chest pain, shortness of breath, palpitation, leg cramps. She understands that she will have the VQ scan to rule out PE. The cardiology is planning to have the cardiac cath likely on Tuesday. She denied fever, chills, nausea, vomiting. No significant overnight event reported per nursing staff. Objective:     Vitals:   Temp (24hrs), Av.8 °F (36.6 °C), Min:97.2 °F (36.2 °C), Max:98.5 °F (36.9 °C)    Temp:  [97.2 °F (36.2 °C)-98.5 °F (36.9 °C)] 97.2 °F (36.2 °C)  HR:  [79-91] 79  Resp:  [16-18] 18  BP: (126-141)/(60-76) 126/60  SpO2:  [95 %-99 %] 99 %  Body mass index is 29.88 kg/m². Input and Output Summary (last 24 hours): Intake/Output Summary (Last 24 hours) at 2023 1432  Last data filed at 2023 1429  Gross per 24 hour   Intake 550 ml   Output 300 ml   Net 250 ml       Physical Exam:   Physical Exam  Vitals and nursing note reviewed. Constitutional:       General: She is not in acute distress. Appearance: She is well-developed. She is not toxic-appearing. HENT:      Head: Normocephalic and atraumatic. Mouth/Throat:      Pharynx: Oropharynx is clear. Eyes:      Extraocular Movements: Extraocular movements intact. Conjunctiva/sclera: Conjunctivae normal.      Pupils: Pupils are equal, round, and reactive to light. Cardiovascular:      Rate and Rhythm: Normal rate and regular rhythm. Heart sounds: No murmur heard.   Pulmonary: Effort: Pulmonary effort is normal. No respiratory distress. Breath sounds: Normal breath sounds. No wheezing or rales. Abdominal:      General: Bowel sounds are normal. There is no distension. Palpations: Abdomen is soft. Tenderness: There is no abdominal tenderness. Musculoskeletal:         General: No swelling. Cervical back: Neck supple. Right lower leg: No edema. Left lower leg: No edema. Skin:     General: Skin is warm and dry. Capillary Refill: Capillary refill takes less than 2 seconds. Coloration: Skin is not jaundiced or pale. Neurological:      Mental Status: She is alert and oriented to person, place, and time. Psychiatric:         Mood and Affect: Mood normal.          Additional Data:     Labs:  Results from last 7 days   Lab Units 11/12/23  0917   WBC Thousand/uL 7.04   HEMOGLOBIN g/dL 9.7*   HEMATOCRIT % 32.6*   PLATELETS Thousands/uL 304   NEUTROS PCT % 57   LYMPHS PCT % 26   MONOS PCT % 8   EOS PCT % 7*     Results from last 7 days   Lab Units 11/12/23  0917 11/11/23  0833 11/10/23  1332   SODIUM mmol/L 135   < > 136   POTASSIUM mmol/L 4.6   < > 4.6   CHLORIDE mmol/L 106   < > 101   CO2 mmol/L 25   < > 25   BUN mg/dL 23   < > 33*   CREATININE mg/dL 1.13   < > 1.48*   ANION GAP mmol/L 4   < > 10   CALCIUM mg/dL 9.0   < > 9.3   ALBUMIN g/dL  --   --  3.7   TOTAL BILIRUBIN mg/dL  --   --  0.28   ALK PHOS U/L  --   --  84   ALT U/L  --   --  8   AST U/L  --   --  14   GLUCOSE RANDOM mg/dL 160*   < > 169*    < > = values in this interval not displayed.      Results from last 7 days   Lab Units 11/10/23  1332   INR  0.90     Results from last 7 days   Lab Units 11/12/23  1120 11/12/23  0755 11/11/23  2057 11/11/23  1615 11/11/23  1224 11/11/23  0823 11/10/23  2308 11/10/23  1749   POC GLUCOSE mg/dl 142* 126 152* 161* 167* 133 139 142*               Lines/Drains:  Invasive Devices       Peripheral Intravenous Line  Duration             Peripheral IV 11/10/23 Distal;Left;Upper;Ventral (anterior) Arm 2 days    Peripheral IV 11/10/23 Proximal;Right;Ventral (anterior) Antecubital 1 day                      Telemetry:  Telemetry Orders (From admission, onward)               24 Hour Telemetry Monitoring  Continuous x 24 Hours (Telem)        Comments: Currently PE rule out   Question:  Reason for 24 Hour Telemetry  Answer:  Pulmonary Embolism                     Telemetry Reviewed: Normal Sinus Rhythm  Indication for Continued Telemetry Use: PE      Imaging: Reviewed radiology reports from this admission including: ultrasound(s)  VAS lower limb venous duplex study, complete bilateral   Final Result by Makayla Mcghee MD (11/11 1226)      XR chest 2 views   ED Interpretation by Chet Duque MD (11/10 1435)   CXR with no acute cardiopulmonary abnormalities. Final Result by Costa Rae MD (11/10 1630)      No acute cardiopulmonary disease.                Workstation performed: [id number]         NM inpatient order    (Results Pending)       Recent Cultures (last 7 days):         Last 24 Hours Medication List:   Current Facility-Administered Medications   Medication Dose Route Frequency Provider Last Rate    acetaminophen  650 mg Oral Q6H PRN Alex Reyes MD      aluminum-magnesium hydroxide-simethicone  30 mL Oral Q4H PRN Prem Factor, DO      aspirin  81 mg Oral Daily Dragan Patiño MD      atorvastatin  40 mg Oral Daily Dragan Patiño MD      baclofen  10 mg Oral BID PRN Dragan Patiño MD      escitalopram  10 mg Oral Daily Dragan Patiño MD      [START ON 11/13/2023] ferrous sulfate  325 mg Oral Daily With Breakfast May Emilia Roland MD      gabapentin  300 mg Oral HS Dragan Patiño MD      heparin (porcine)  3-30 Units/kg/hr (Order-Specific) Intravenous Titrated Chet Duque MD 13 Units/kg/hr (11/12/23 1021)    heparin (porcine)  2,600 Units Intravenous Q6H PRN Chet Duque MD      heparin (porcine)  5,200 Units Intravenous Q6H PRN Chet Duque MD insulin lispro  1-5 Units Subcutaneous 4x Daily (AC & HS) Judah Adams MD      latanoprost  1 drop Both Eyes HS Judah Adams MD      levothyroxine  37.5 mcg Oral Daily Judah Adams MD      meclizine  12.5 mg Oral Q8H PRN Judah Adams MD      melatonin  6 mg Oral HS Susana Santana DO      metoprolol succinate  25 mg Oral Daily Judah Adams MD      Milnacipran HCl  1 tablet Oral Daily Judah Adams MD      oxybutynin  10 mg Oral Daily Judah Adams MD      oxyCODONE-acetaminophen  2 tablet Oral Q12H PRN Judah Adams MD      pantoprazole  40 mg Oral Daily Judah Adams MD      polyethylene glycol  17 g Oral Daily Sy Zafar MD      senna-docusate sodium  2 tablet Oral HS Judah Adams MD      sodium chloride  100 mL/hr Intravenous Continuous Zoë Oliva  mL/hr (11/12/23 1404)     Nutrition Assessment and Intervention:     Reviewed food recall journal      Physical Activity Assessment and Intervention:    Activity journal reviewed    Activity journal completion recommended      Emotional and Mental Well-being, Sleep, Connectedness Assessment and Intervention:    Sleep/stress assessment performed    Depression and anxiety screening performed and reviewed      Tobacco and Toxic Substance Assessment and Intervention:     Tobacco use screening performed    Alcohol and drug use screening performed      Therapeutic Lifestyle Change Visit:     One-on-one comprehensive counseling, coaching, and health behavior change visit completed          Today, Patient Was Seen By: Jacinda Sepulveda MD    **Please Note: This note may have been constructed using a voice recognition system. **

## 2023-11-12 NOTE — ASSESSMENT & PLAN NOTE
POA Cr 1.48 with baseline 0.8-1. Possible due to decreased PO intake per daughter report    Lab Results   Component Value Date    CREATININE 1.13 11/12/2023    CREATININE 1.26 11/11/2023    CREATININE 1.48 (H) 11/10/2023    CREATININE 0.92 08/28/2023      Plan    Encourage PO intake  Hold PTA lisinopril in setting of SADAF  Repeat BMP.  Replete electrolytes prn

## 2023-11-12 NOTE — PLAN OF CARE
Problem: PHYSICAL THERAPY ADULT  Goal: Performs mobility at highest level of function for planned discharge setting. See evaluation for individualized goals. Description: Treatment/Interventions: Functional transfer training, LE strengthening/ROM, Elevations, Therapeutic exercise, Endurance training, Cognitive reorientation, Patient/family training, Equipment eval/education, Bed mobility, Gait training, Compensatory technique education          See flowsheet documentation for full assessment, interventions and recommendations. 11/12/2023 1355 by Dorothea Pike PT  Note: Prognosis: Good  Problem List: Decreased endurance, Impaired balance, Decreased mobility, Decreased cognition, Decreased safety awareness, Impaired judgement  Assessment: Sal Salter is a 78 y.o. Female who [facility] due to SOB and diagnosis of acute respiratory failure. Orders for PT eval and treat received, w/ activity orders of up and OOB as tolerated. Comorbidities affecting pt's functional mobility at time of evaluation include: DM, glaucoma, COPD, anemia, CVA. Personal factors affecting DC include: ambulating w/ assistive device, stairs to enter home, decreased cognition, and inability to perform IADLs. At baseline, pt mobilizes independently w/ rollator walker, and w/ 0 fall(s) in the previous 6 months. Upon evaluation, pt presents w/ the following deficits: impaired balance, impaired cognition, decreased safety awareness, decreased endurance/activity tolerance, and gait deviations. Pt currently requires  supervision for transfers, supervision w/ rollator walker for ambulation. Pt's clinical presentation is unstable/unpredictable due to need for input for mobility technique, need to input for safety awareness, ongoing medical management. From a PT/mobility standpoint given the above findings, DC recommendation is level: III (Minimum Rehab Resource Intensity).  During current admission, pt will benefit from continued skilled inpatient PT in the acute care setting in order to address the above deficits and to maximize function and mobility prior to DC from acute care. Rehab Resource Intensity Level, PT: III (Minimum Resource Intensity)    See flowsheet documentation for full assessment.

## 2023-11-12 NOTE — ASSESSMENT & PLAN NOTE
Patient presents from PCP office with chest pressure, SOB, lightheadedness and hypoxia with exertion, and tachycardia  At rest in the ED O2Sat mid 90s on RA. Desat to high 80s with ambulation and tachycardia  EKG sinus tachycardia with T wave inversion in lead III, prolonged Qtc  Last echo 2/23 showing 65% EF and normal systolic function. No aortic stenosis  CXR normal per my read. History of tobacco use, quit 40 years ago  History of falls in the past 3 months with unknown etiology. Seen by ENT in 9/23 - likely medication induced vs vascular issue. Vestibular therapy recommended  Elevated D-dimer 0.86.  Troponin negative, BNP 25  SADAF with Cr 1.48 on admission  (baseline 0.8-1)  Creatinine improved 1.26   Patient gets chest pressure and desaturates when ambulating   No evidence of acute or chronic deep vein thrombosis VAS lower limb   Recheck echocardiogram yesterday revealed LVEF 70%    Plan  Continue Heparin gtt currently  V/Q scan for PE rule out ordered for Monday  Cardiology consulted due to chest pressure with ambulation and recommendations appreciated  Possible Cardiac Cath on Tuesday  if no PE but will need Contrast allergy prep for Monday    Ambulatory pulse ox  Monitor on telemetry

## 2023-11-12 NOTE — OCCUPATIONAL THERAPY NOTE
Occupational Therapy Evaluation     Patient Name: Willis Pinto  BNQPM'N Date: 11/12/2023  Problem List  Principal Problem:    Acute respiratory failure  Active Problems:    Type 2 diabetes mellitus (HCC)    Glaucoma    Hypertension    Hypothyroidism    Overactive bladder    Acute kidney injury ([address])    Anemia    Ambulatory dysfunction    History of lumbar surgery    Past Medical History  Past Medical History:   Diagnosis Date    Acid reflux     Anemia     hx of iron-deficient    Anxiety     Arthritis     Asthma     last needed inhaler last year 2020    Basal cell carcinoma     upper lip    Chronic narcotic dependence (HCC)     Chronic pain     Colon polyp     Cystocele     Diabetes mellitus ([address])     stable    Disease of thyroid gland     hypothyroidism    Diverticulosis     Dizziness     at times    Dysfunctional uterine bleeding     last assessed - [id number]    Dysphagia     Fibromyalgia     Gastric ulcer     Gastroparesis     History of colonic polyps     last assessed - [id number]    History of gastroesophageal reflux (GERD)     Hypercholesterolemia     Hyperlipidemia     Hypertension     IBS (irritable bowel syndrome)     Pneumobilia 06/18/2022    Post laminectomy syndrome     S/P insertion of spinal cord stimulator 07/18/2018    Seasonal allergies     Shortness of breath     exertional    Spinal stenosis     Status post lumbar spinal fusion 03/16/2018    Stroke [facility])     pt states slight stroke March 2022     Past Surgical History  Past Surgical History:   Procedure Laterality Date    APPENDECTOMY      BACK SURGERY      BREAST CYST EXCISION Left     CHOLECYSTECTOMY      COLONOSCOPY      ESOPHAGOGASTRODUODENOSCOPY N/A 09/28/2016    Procedure: ESOPHAGOGASTRODUODENOSCOPY (EGD); Surgeon: Awa Nunez MD;  [facility]: [facility];   Service:     HERNIA REPAIR      HYSTERECTOMY      TTAH-BSO age 25    LAMINECTOMY      LUMBAR LAMINECTOMY      OOPHORECTOMY      age 25    FL ARTHRODESIS POSTERIOR/PSTLAT TQ 1NTRSPC THORACIC N/A 06/04/2018    Procedure: Reopening of lumbar incision for T12-L5 posterior instrumented fixation and fusion and T12-L4 posterior decompression;  Surgeon: Iver Merlin, MD;  [facility]: [facility];  Service: Neurosurgery    AZ COLONOSCOPY FLX DX W/COLLJ SPEC WHEN PFRMD N/A 03/02/2016    Procedure: EGD AND COLONOSCOPY;  Surgeon: Vishnu Lea MD;  [facility]: [facility]; Service: Gastroenterology    AZ DILATION [address] [facility] UNGUIDED SOUND/BOUGIE 1/MULT PASS N/A 09/28/2016    Procedure: DILATATION ESOPHAGEAL;  Surgeon: Vishnu Lea MD;  [facility]: [facility]; Service: Gastroenterology    AZ ESOPHAGOGASTRODUODENOSCOPY TRANSORAL DIAGNOSTIC N/A 07/18/2016    Procedure: ESOPHAGOGASTRODUODENOSCOPY (EGD); Surgeon: Vishnu Lea MD;  [facility]; Service: Gastroenterology    AZ INSJ/RPLCMT SPI NPGR DIR/INDUXIVE COUPLING Left 06/04/2018    Procedure: removal of left buttock implantable pulse generator and placement of new  implantable pulse generator;  Surgeon: Iver Merlin, MD;  [facility];  Service: Neurosurgery             11/12/23 0809   OT Last Visit   OT Visit Date 11/12/23   Note Type   Note type Evaluation   Pain Assessment   Pain Assessment Tool 0-10   Pain Score 9   Pain Location/Orientation Location: Head   Restrictions/Precautions   Weight Bearing Precautions Per Order No   Other Precautions Cognitive; Chair Alarm; Bed Alarm; Fall Risk;Multiple lines;Pain   Home Living   Type of [address] [place] [address] Two level; Able to live on main level with bedroom/bathroom;Stairs to enter with rails  (FFSU 2-3 ELLYN)   Bathroom Shower/Tub Tub/shower unit   Bathroom Toilet Standard   Bathroom Equipment Grab bars in shower   [address] [place];Cane   Additional Comments use of rollator at baseline   Prior Function   Level of [place] Independent with ADLs   Lives With Son  (pt reports that son is disabled)   Receives Help From Family  (supportive daughters are local and assist when able)   IADLs Independent with meal prep; Independent with medication management; Family/Friend/Other provides transportation  (daughters/friends provide transportation)   Falls in the last 6 months 0   Vocational Retired   Lifestyle   Autonomy PTA pt living with son in [facility] with [address], pt (I) with ADLs and (A) with IADLs, (-)falls, (-)drives, use of rollator at baseline   Reciprocal Relationships supportive children   Service to Others retired   Intrinsic Gratification enjoys going out to breakfast with her friends   General   Family/Caregiver Present No   Subjective   Subjective "I just have such a headache"   ADL   Eating Assistance 6  Modified independent   Eating Deficit   (eating breakfast at end of session)   Grooming Assistance 5  Supervision/Setup   Grooming Deficit Increased time to complete;Wash/dry hands   UB Bathing Assistance 5  Supervision/Setup   LB Bathing Assistance 5  [address] 5  Supervision/Setup   LB Dressing Assistance 5  Supervision/Setup   LB Dressing Deficit Increased time to complete;Supervision/safety;Verbal cueing; Thread RLE into underwear; Thread LLE into underwear;Pull up over hips   Toileting Assistance  5  Supervision/Setup   Toileting Deficit Clothing management down;Perineal hygiene;Clothing management up;Grab bar use  (completed sitting on toilet)   Additional Comments Did not observe UB bathing, LB bathing, and UB dressing at time of evaluation, with use of clinical reasoning, pt's performance throughout evaluation indicates that pt may be able to perform these tasks at the levels listed above   Bed Mobility   Supine to Sit 5  Supervision   Additional items Increased time required;Verbal cues   Transfers   Sit to Stand 5  Supervision   Additional items Increased time required;Verbal cues   Stand to Sit 5  Supervision   Additional items Increased time required;Verbal cues   Toilet transfer 5  Supervision   Additional items Increased time required;Verbal cues;Standard toilet   Additional Comments use of rollator, min VC for reminders that brakes not intact on rollator   Functional Mobility   Functional Mobility 5  Supervision   Additional Comments functional household distance   Additional items   (rollator)   Balance   Static Sitting Good   Dynamic Sitting Good   Static Standing Fair   Dynamic Standing Fair -   Ambulatory Fair -   Activity Tolerance   Activity Tolerance Patient tolerated treatment well   Medical Staff Made Aware ARLETTE Carlos   RUMEHRAN Assessment   RUE Assessment WFL   LUE Assessment   LUE Assessment WFL   Hand Function   Gross Motor Coordination Functional   Fine Motor Coordination Functional   Vision-Basic Assessment   Current Vision Wears glasses only for reading   Cognition   Overall Cognitive Status Impaired   Arousal/Participation Alert; Cooperative   Attention Attends with cues to redirect   Orientation Level Oriented X4   Memory Decreased short term memory;Decreased recall of recent events   Following Commands Follows one step commands with increased time or repetition   Comments limited insight into deficits, poor problem solving. Flat affect   Assessment   Limitation Decreased ADL status; Decreased Safe judgement during ADL;Decreased cognition;Decreased endurance;Decreased self-care trans;Decreased high-level ADLs  (impaired pain, balance, fxnl mobility, act lila, standing lila, strength, attention to task, safety awareness, insight, problem solving)   Prognosis Good   Assessment Pt is a 78 y.o. female seen for OT evaluation s/p [facility] on 11/10/2023 due to Shortness of breath. Personal and env factors supporting pt at time of IE include accessible home environment, [address], and (I) with ADLs, shares IADLs, supportive family . Personal and env factors inhibiting engagement in occupations include advanced age and difficulty completing IADLs. Performance deficits that affect the pt’s occupational performance can be seen above.  Due to pt's current functional limitations and medical complications pt is functioning below baseline. Pt would benefit from continued skilled OT treatment in order to maximize safety, independence and overall performance with ADLs, functional mobility, functional transfers, and cognition in order to achieve highest level of function. Goals   Patient Goals to get some rest   LTG Time Frame 10-14   Long Term Goal see goals listed below   Plan   Treatment Interventions ADL retraining;Functional transfer training; Endurance training;Cognitive reorientation;Patient/family training;Equipment evaluation/education; Compensatory technique education; Activityengagement   Goal Expiration Date 11/22/23   OT Treatment Day 0   OT Frequency 3-5x/wk   Discharge Recommendation   Rehab Resource Intensity Level, OT III (Minimum Resource Intensity)   AM-PAC Daily Activity Inpatient   Lower Body Dressing 3   Bathing 3   Toileting 3   Upper Body Dressing 3   Grooming 3   Eating 4   Daily Activity Raw Score 19   Daily Activity Standardized Score (Calc for Raw Score >=11) 40.22   AM-PAC Applied Cognition Inpatient   Following a Speech/Presentation 3   Understanding Ordinary Conversation 3   Taking Medications 2   Remembering Where Things Are Placed or Put Away 2   Remembering List of 4-5 Errands 2   Taking Care of Complicated Tasks 1   Applied Cognition Raw Score 13   Applied Cognition Standardized Score 30.46   End of Consult   Patient Position at End of Consult Bedside chair;Bed/Chair alarm activated; All needs within reach     GOALS:      -Patient will perform grooming tasks standing at sink with overall Mod I in order to increase overall independence    -Patient will be Mod I with UB dressing using AE and AD as needed in order to increase (I) with ADLs    -Patient will be Mod I with UB bathing using AE and AD as needed in order to increase (I) with ADLs    -Patient will be Mod I with LB dressing with use of AE and AD as needed in order to increase (I) with ADLs    -Patient will be Mod I with LB bathing with use of AE and AD as needed in order to increase (I) with ADLs    -Patient will complete toileting w/ Mod I w/ G hygiene/thoroughness in order to reduce caregiver burden    -Patient will demonstrate Mod I with bed mobility for ability to manage own comfort and initiate OOB tasks.     -Patient will perform functional transfers with Mod I to/from all surfaces using DME as needed in order to increase (I) with functional tasks    -Patient will be Mod I with functional mobility to/from bathroom for increased independence with toileting tasks    -Patient will engage in ongoing cognitive assessment in order to assist with safe discharge planning/recommendations. The patient's raw score on the -PAC Daily Activity Inpatient Short Form is 19. A raw score of greater than or equal to 19 suggests the patient may benefit from discharge to home. Please refer to the recommendation of the Occupational Therapist for safe discharge planning.       Royce Weaver MS, OTR/L

## 2023-11-13 ENCOUNTER — APPOINTMENT (INPATIENT)
Dept: RADIOLOGY | Facility: HOSPITAL | Age: 78
DRG: 286 | End: 2023-11-13
Payer: MEDICARE

## 2023-11-13 ENCOUNTER — APPOINTMENT (OUTPATIENT)
Dept: NUCLEAR MEDICINE | Facility: HOSPITAL | Age: 78
DRG: 286 | End: 2023-11-13
Payer: MEDICARE

## 2023-11-13 LAB
ANION GAP SERPL CALCULATED.3IONS-SCNC: 5 MMOL/L
APTT PPP: 59 SECONDS (ref 23–37)
APTT PPP: 72 SECONDS (ref 23–37)
APTT PPP: 94 SECONDS (ref 23–37)
BUN SERPL-MCNC: 16 MG/DL (ref 5–25)
CALCIUM SERPL-MCNC: 7.1 MG/DL (ref 8.4–10.2)
CHLORIDE SERPL-SCNC: 113 MMOL/L (ref 96–108)
CO2 SERPL-SCNC: 21 MMOL/L (ref 21–32)
CREAT SERPL-MCNC: 0.83 MG/DL (ref 0.6–1.3)
ERYTHROCYTE [DISTWIDTH] IN BLOOD BY AUTOMATED COUNT: 14.7 % (ref 11.6–15.1)
GFR SERPL CREATININE-BSD FRML MDRD: 66 ML/MIN/1.73SQ M
GLUCOSE SERPL-MCNC: 106 MG/DL (ref 65–140)
GLUCOSE SERPL-MCNC: 108 MG/DL (ref 65–140)
GLUCOSE SERPL-MCNC: 122 MG/DL (ref 65–140)
GLUCOSE SERPL-MCNC: 127 MG/DL (ref 65–140)
GLUCOSE SERPL-MCNC: 170 MG/DL (ref 65–140)
HCT VFR BLD AUTO: 26.4 % (ref 34.8–46.1)
HGB BLD-MCNC: 8 G/DL (ref 11.5–15.4)
HGB BLD-MCNC: 9.4 G/DL (ref 11.5–15.4)
MAGNESIUM SERPL-MCNC: 1.6 MG/DL (ref 1.9–2.7)
MCH RBC QN AUTO: 28.8 PG (ref 26.8–34.3)
MCHC RBC AUTO-ENTMCNC: 30.3 G/DL (ref 31.4–37.4)
MCV RBC AUTO: 95 FL (ref 82–98)
PHOSPHATE SERPL-MCNC: 2.7 MG/DL (ref 2.3–4.1)
PLATELET # BLD AUTO: 178 THOUSANDS/UL (ref 149–390)
PMV BLD AUTO: 10.8 FL (ref 8.9–12.7)
POTASSIUM SERPL-SCNC: 4 MMOL/L (ref 3.5–5.3)
RBC # BLD AUTO: 2.78 MILLION/UL (ref 3.81–5.12)
SODIUM SERPL-SCNC: 139 MMOL/L (ref 135–147)
WBC # BLD AUTO: 5.93 THOUSAND/UL (ref 4.31–10.16)

## 2023-11-13 PROCEDURE — 71045 X-RAY EXAM CHEST 1 VIEW: CPT

## 2023-11-13 PROCEDURE — 99232 SBSQ HOSP IP/OBS MODERATE 35: CPT | Performed by: INTERNAL MEDICINE

## 2023-11-13 PROCEDURE — 84100 ASSAY OF PHOSPHORUS: CPT | Performed by: INTERNAL MEDICINE

## 2023-11-13 PROCEDURE — 80048 BASIC METABOLIC PNL TOTAL CA: CPT | Performed by: INTERNAL MEDICINE

## 2023-11-13 PROCEDURE — 85027 COMPLETE CBC AUTOMATED: CPT | Performed by: INTERNAL MEDICINE

## 2023-11-13 PROCEDURE — 78582 LUNG VENTILAT&PERFUS IMAGING: CPT

## 2023-11-13 PROCEDURE — A9558 XE133 XENON 10MCI: HCPCS

## 2023-11-13 PROCEDURE — 85730 THROMBOPLASTIN TIME PARTIAL: CPT | Performed by: INTERNAL MEDICINE

## 2023-11-13 PROCEDURE — A9540 TC99M MAA: HCPCS

## 2023-11-13 PROCEDURE — G1004 CDSM NDSC: HCPCS

## 2023-11-13 PROCEDURE — 82948 REAGENT STRIP/BLOOD GLUCOSE: CPT

## 2023-11-13 PROCEDURE — 83735 ASSAY OF MAGNESIUM: CPT | Performed by: INTERNAL MEDICINE

## 2023-11-13 PROCEDURE — 85018 HEMOGLOBIN: CPT

## 2023-11-13 RX ORDER — ASPIRIN 81 MG/1
81 TABLET, CHEWABLE ORAL DAILY
Status: DISCONTINUED | OUTPATIENT
Start: 2023-11-15 | End: 2023-11-15 | Stop reason: HOSPADM

## 2023-11-13 RX ORDER — BISACODYL 10 MG
10 SUPPOSITORY, RECTAL RECTAL DAILY PRN
Status: DISCONTINUED | OUTPATIENT
Start: 2023-11-13 | End: 2023-11-15 | Stop reason: HOSPADM

## 2023-11-13 RX ORDER — ASPIRIN 81 MG/1
324 TABLET, CHEWABLE ORAL ONCE
Status: COMPLETED | OUTPATIENT
Start: 2023-11-14 | End: 2023-11-14

## 2023-11-13 RX ORDER — SODIUM CHLORIDE 9 MG/ML
100 INJECTION, SOLUTION INTRAVENOUS CONTINUOUS
Status: DISCONTINUED | OUTPATIENT
Start: 2023-11-14 | End: 2023-11-14

## 2023-11-13 RX ORDER — METHYLPREDNISOLONE 16 MG/1
32 TABLET ORAL
Status: COMPLETED | OUTPATIENT
Start: 2023-11-13 | End: 2023-11-14

## 2023-11-13 RX ORDER — DIPHENHYDRAMINE HCL 25 MG
50 TABLET ORAL
Status: DISCONTINUED | OUTPATIENT
Start: 2023-11-14 | End: 2023-11-14

## 2023-11-13 RX ORDER — MAGNESIUM SULFATE HEPTAHYDRATE 40 MG/ML
2 INJECTION, SOLUTION INTRAVENOUS ONCE
Status: COMPLETED | OUTPATIENT
Start: 2023-11-13 | End: 2023-11-13

## 2023-11-13 RX ORDER — METOPROLOL SUCCINATE 25 MG/1
25 TABLET, EXTENDED RELEASE ORAL EVERY 12 HOURS
Status: DISCONTINUED | OUTPATIENT
Start: 2023-11-13 | End: 2023-11-14

## 2023-11-13 RX ORDER — DIPHENHYDRAMINE HCL 25 MG
50 TABLET ORAL
Status: DISCONTINUED | OUTPATIENT
Start: 2023-11-13 | End: 2023-11-13

## 2023-11-13 RX ORDER — METHYLPREDNISOLONE 16 MG/1
32 TABLET ORAL
Status: DISCONTINUED | OUTPATIENT
Start: 2023-11-13 | End: 2023-11-13

## 2023-11-13 RX ADMIN — SENNOSIDES, DOCUSATE SODIUM 2 TABLET: 8.6; 5 TABLET ORAL at 22:09

## 2023-11-13 RX ADMIN — OXYCODONE HYDROCHLORIDE AND ACETAMINOPHEN 2 TABLET: 5; 325 TABLET ORAL at 16:18

## 2023-11-13 RX ADMIN — LATANOPROST 1 DROP: 50 SOLUTION OPHTHALMIC at 22:11

## 2023-11-13 RX ADMIN — Medication 6 MG: at 22:10

## 2023-11-13 RX ADMIN — OXYBUTYNIN CHLORIDE 10 MG: 5 TABLET, EXTENDED RELEASE ORAL at 09:19

## 2023-11-13 RX ADMIN — FERROUS SULFATE TAB 325 MG (65 MG ELEMENTAL FE) 325 MG: 325 (65 FE) TAB at 09:19

## 2023-11-13 RX ADMIN — HEPARIN SODIUM 15 UNITS/KG/HR: 10000 INJECTION, SOLUTION INTRAVENOUS at 02:39

## 2023-11-13 RX ADMIN — ESCITALOPRAM OXALATE 10 MG: 10 TABLET ORAL at 09:20

## 2023-11-13 RX ADMIN — HEPARIN SODIUM 2600 UNITS: 1000 INJECTION INTRAVENOUS; SUBCUTANEOUS at 02:39

## 2023-11-13 RX ADMIN — PANTOPRAZOLE SODIUM 40 MG: 40 TABLET, DELAYED RELEASE ORAL at 09:20

## 2023-11-13 RX ADMIN — ATORVASTATIN CALCIUM 40 MG: 40 TABLET, FILM COATED ORAL at 09:19

## 2023-11-13 RX ADMIN — GABAPENTIN 300 MG: 300 CAPSULE ORAL at 22:10

## 2023-11-13 RX ADMIN — LEVOTHYROXINE SODIUM 37.5 MCG: 75 TABLET ORAL at 09:19

## 2023-11-13 RX ADMIN — METHYLPREDNISOLONE 32 MG: 16 TABLET ORAL at 22:10

## 2023-11-13 RX ADMIN — METOPROLOL SUCCINATE ER TABLETS 25 MG: 25 TABLET, FILM COATED, EXTENDED RELEASE ORAL at 09:19

## 2023-11-13 RX ADMIN — MILNACIPRAN HYDROCHLORIDE 100 MG: 100 TABLET, FILM COATED ORAL at 09:21

## 2023-11-13 RX ADMIN — POLYETHYLENE GLYCOL 3350 17 G: 17 POWDER, FOR SOLUTION ORAL at 09:19

## 2023-11-13 RX ADMIN — INSULIN LISPRO 1 UNITS: 100 INJECTION, SOLUTION INTRAVENOUS; SUBCUTANEOUS at 22:09

## 2023-11-13 RX ADMIN — MAGNESIUM SULFATE HEPTAHYDRATE 2 G: 40 INJECTION, SOLUTION INTRAVENOUS at 09:18

## 2023-11-13 RX ADMIN — ASPIRIN 81 MG CHEWABLE TABLET 81 MG: 81 TABLET CHEWABLE at 09:19

## 2023-11-13 RX ADMIN — METOPROLOL SUCCINATE 25 MG: 25 TABLET, EXTENDED RELEASE ORAL at 22:13

## 2023-11-13 RX ADMIN — BISACODYL 10 MG: 10 SUPPOSITORY RECTAL at 09:19

## 2023-11-13 NOTE — CASE MANAGEMENT
Case Management Progress Note    Patient name Kam De La Torre  Location S /S -10 MRN [id number]  : 1945 Date 2023       LOS (days): 3  Geometric Mean LOS (GMLOS) (days):   Days to GMLOS:        OBJECTIVE:        Current admission status: Inpatient  Preferred Pharmacy:   [facility] [address] [place] [address] [address] [facility]  Phone: [phone] Fax: [phone]    [address] [place], [place] - [address]  [address] [address]  Phone: [phone] Fax: [phone]    [facility] [address] [place], [place] - [address]  [address]  [address] [facility] [address]  Phone: [phone] Fax: [phone]    [facility] [address] [place] [address]  Phone: [phone] Fax: [phone]    Primary Care Provider: NANY Melendez    Primary Insurance: [address] [facility] [address] [facility]  Secondary Insurance:     PROGRESS NOTE:    CM contacted [facility] (ph: [phone]) re: patient dcp, s/w Sabina Walsh (pt's CM) notified him of patient need for home PT/OT upon d/c. Pt likely d/c 48-72hrs per SLIM rounds. Sabina Walsh confirmed [facility] able to provide [address] for patient, once or twice a week. CM to continue to follow for any further CM d/c needs.

## 2023-11-13 NOTE — ASSESSMENT & PLAN NOTE
Lab Results   Component Value Date    HGBA1C 6.6 (H) 06/09/2023       Recent Labs     11/14/23  1201 11/14/23  1528 11/14/23  2225 11/15/23  0750   POCGLU 173* 210* 215* 142*       Blood Sugar Average: Last 72 hrs:  (P) [id number]  Well-controlled.  Hold PTA metformin, initiate sliding scale  Accu-Chek

## 2023-11-13 NOTE — PROGRESS NOTES
General Cardiology   Progress Note -  [facility]   [facility] Elder 78 y.o. female MRN: [id number]  Unit/Bed#: S -01 Encounter: [id number]    Assessment    Exertional shortness of breath  Chest pressure and exertional SOB for some time prior to admission  Symptoms resolve with rest  BNP 25. No signs of heart failure, likely hypovolemic on admission with SADAF and under filled LV on echo. D Dimer 0.86  Had O2 desats with ambulation- V/Q scan today to eval for PE. Per RN- no desats with PT yesterday  TTE 11/12: LV hyperdynamic, EF 38%, grade 1 diastolic dysfunction, dynamic LVOT obstruction with Valsalva with a peak gradient of 117 mmHg. RV size/function normal and no significant valve disease. SADAF- improved with IVF, creatinine 1.48 on admission now 0.86    HTN-initially controlled but now elevated, last /74. Home lisinopril 20 mg daily is held given SADAF on admission. She has been continued on metoprolol succinate 25 mg daily. HLD- , triglycerides 84, HDL 50, LDL 62 on atorvastatin 40 mg daily    Type 2 DM-A1c 6.6% in June 2023    Hypothyroidism-TSH WNL in June    Anemia-hemoglobin 8.0, from 9.7 yesterday. ?Dilutional from IV fluids. Baseline 9-10    Hypomagnesemia-magnesium 1.6    Plan/discussion  Follow-up VQ scan being completed today  If VQ scan shows low risk for PE then would proceed with cardiac catheterization tomorrow. This was discussed with patient and she is agreeable. She will need precath prep due to IV dye allergy. Continue aspirin, statin, beta-blocker  Continue to hold lisinopril for now given plans for possible dye exposure  Continue to monitor on telemetry  A.m. BMP    Subjective  Worked with PT yesterday, had recurrence of chest pressure/heaviness and SOB. Took 45 minutes to fully resolve with rest.  Review of Systems   Constitutional: Negative for chills, malaise/fatigue and weight gain. Cardiovascular:  Positive for chest pain and dyspnea on exertion.  Negative for leg swelling, orthopnea, palpitations and syncope. Respiratory:  Negative for cough, shortness of breath, sleep disturbances due to breathing and sputum production. Endocrine: Negative. Hematologic/Lymphatic: Negative. Gastrointestinal:  Negative for bloating, nausea and vomiting. Genitourinary: Negative. Neurological:  Negative for dizziness, light-headedness and weakness. Psychiatric/Behavioral:  Negative for altered mental status. All other systems reviewed and are negative. Objective:   Vitals: Blood pressure 162/74, pulse 72, temperature 98.3 °F (36.8 °C), resp. rate 18, height 5' (1.524 m), weight 69.4 kg (153 lb), SpO2 97 %, not currently breastfeeding., Body mass index is 29.88 kg/m². ,     Systolic (79ZGH), BXL:445 , Min:162 , THV:992     Diastolic (70YGS), SNC:80, Min:74, Max:92        Intake/Output Summary (Last 24 hours) at 11/13/2023 1015  Last data filed at 11/12/2023 1801  Gross per 24 hour   Intake 550 ml   Output 550 ml   Net 0 ml     Wt Readings from Last 3 Encounters:   11/12/23 69.4 kg (153 lb)   09/13/23 69.4 kg (153 lb)   08/28/23 69.4 kg (153 lb)     Telemetry Review: NSR    Physical Exam  Vitals reviewed. Constitutional:       General: She is not in acute distress. Neck:      Vascular: No hepatojugular reflux or JVD. Cardiovascular:      Rate and Rhythm: Normal rate and regular rhythm. Heart sounds: Murmur heard. Systolic murmur is present with a grade of 1/6. No friction rub. No gallop. Pulmonary:      Effort: Pulmonary effort is normal. No respiratory distress. Breath sounds: No rales. Abdominal:      General: Bowel sounds are normal. There is no distension. Palpations: Abdomen is soft. Tenderness: There is no abdominal tenderness. Musculoskeletal:         General: No tenderness. Normal range of motion. Cervical back: Neck supple. Right lower leg: No edema. Left lower leg: No edema.    Skin:     General: Skin is warm and dry. Findings: No erythema. Neurological:      Mental Status: She is alert and oriented to person, place, and time.    Psychiatric:         Mood and Affect: Mood normal.       LABORATORY RESULTS      CBC with diff:   Results from last 7 days   Lab Units 11/13/23  0343 11/12/23  0917 11/11/23  0833 11/10/23  2240 11/10/23  1332   WBC Thousand/uL 5.93 7.04 8.36  --  9.55   HEMOGLOBIN g/dL 8.0* 9.7* 10.3*  --  10.4*   HEMATOCRIT % 26.4* 32.6* 33.8*  --  33.6*   MCV fL 95 96 91  --  92   PLATELETS Thousands/uL 178 304 265 281 263   RBC Million/uL 2.78* 3.41* 3.71*  --  3.65*   MCH pg 28.8 28.4 27.8  --  28.5   MCHC g/dL 30.3* 29.8* 30.5*  --  31.0*   RDW % 14.7 15.1 14.9  --  14.6   MPV fL 10.8 11.2 11.1 10.8 11.0   NRBC AUTO /100 WBCs  --  1 0  --  0     CMP:  Results from last 7 days   Lab Units 11/13/23  0343 11/12/23  0917 11/11/23  0833 11/10/23  1332   POTASSIUM mmol/L 4.0 4.6 4.4 4.6   CHLORIDE mmol/L 113* 106 104 101   CO2 mmol/L 21 25 26 25   BUN mg/dL 16 23 27* 33*   CREATININE mg/dL 0.83 1.13 1.26 1.48*   CALCIUM mg/dL 7.1* 9.0 9.1 9.3   AST U/L  --   --   --  14   ALT U/L  --   --   --  8   ALK PHOS U/L  --   --   --  84   EGFR ml/min/1.73sq m 66 46 40 33     BMP:  Results from last 7 days   Lab Units 11/13/23  0343 11/12/23  0917 11/11/23  0833 11/10/23  1332   POTASSIUM mmol/L 4.0 4.6 4.4 4.6   CHLORIDE mmol/L 113* 106 104 101   CO2 mmol/L 21 25 26 25   BUN mg/dL 16 23 27* 33*   CREATININE mg/dL 0.83 1.13 1.26 1.48*   CALCIUM mg/dL 7.1* 9.0 9.1 9.3     Lab Results   Component Value Date    CREATININE 0.83 11/13/2023    CREATININE 1.13 11/12/2023    CREATININE 1.26 11/11/2023      Results from last 7 days   Lab Units 11/13/23  0343 11/11/23  0833   MAGNESIUM mg/dL 1.6* 1.5*     Results from last 7 days   Lab Units 11/10/23  1332   INR  0.90     Lipid Profile:   Lab Results   Component Value Date    CHOL 165 04/30/2015    CHOL 208 03/20/2014     Lab Results   Component Value Date    HDL 50 11/11/2023    HDL 48 (L) 06/09/2023    HDL 48 (L) 04/23/2022     Lab Results   Component Value Date    LDLCALC 62 11/11/2023    LDLCALC 51 06/09/2023    LDLCALC 92 04/23/2022     Lab Results   Component Value Date    TRIG 84 11/11/2023    TRIG 166 (H) 06/09/2023    TRIG 97 04/23/2022     Meds/Allergies   all current active meds have been reviewed and current meds:   Current Facility-Administered Medications   Medication Dose Route Frequency    acetaminophen (TYLENOL) tablet 650 mg  650 mg Oral Q6H PRN    aluminum-magnesium hydroxide-simethicone (MAALOX) oral suspension 30 mL  30 mL Oral Q4H PRN    aspirin chewable tablet 81 mg  81 mg Oral Daily    atorvastatin (LIPITOR) tablet 40 mg  40 mg Oral Daily    baclofen tablet 10 mg  10 mg Oral BID PRN    bisacodyl (DULCOLAX) rectal suppository 10 mg  10 mg Rectal Daily PRN    escitalopram (LEXAPRO) tablet 10 mg  10 mg Oral Daily    ferrous sulfate tablet 325 mg  325 mg Oral Daily With Breakfast    gabapentin (NEURONTIN) capsule 300 mg  300 mg Oral HS    heparin (porcine) 25,000 units in 0.45% NaCl 250 mL infusion (premix)  3-30 Units/kg/hr (Order-Specific) Intravenous Titrated    heparin (porcine) injection 2,600 Units  2,600 Units Intravenous Q6H PRN    heparin (porcine) injection 5,200 Units  5,200 Units Intravenous Q6H PRN    insulin lispro (HumaLOG) 100 units/mL subcutaneous injection 1-5 Units  1-5 Units Subcutaneous 4x Daily (AC & HS)    latanoprost (XALATAN) 0.005 % ophthalmic solution 1 drop  1 drop Both Eyes HS    levothyroxine tablet 37.5 mcg  37.5 mcg Oral Daily    magnesium sulfate 2 g/50 mL IVPB (premix) 2 g  2 g Intravenous Once    meclizine (ANTIVERT) tablet 12.5 mg  12.5 mg Oral Q8H PRN    melatonin tablet 6 mg  6 mg Oral HS    metoprolol succinate (TOPROL-XL) 24 hr tablet 25 mg  25 mg Oral Daily    Milnacipran HCl TABS 100 mg  1 tablet Oral Daily    oxybutynin (DITROPAN-XL) 24 hr tablet 10 mg  10 mg Oral Daily    oxyCODONE-acetaminophen (PERCOCET) 5-325 mg per tablet 2 tablet  2 tablet Oral Q12H PRN    pantoprazole (PROTONIX) EC tablet 40 mg  40 mg Oral Daily    polyethylene glycol (MIRALAX) packet 17 g  17 g Oral Daily    senna-docusate sodium (SENOKOT S) 8.6-50 mg per tablet 2 tablet  2 tablet Oral HS     Medications Prior to Admission   Medication    albuterol (PROVENTIL HFA,VENTOLIN HFA) 90 mcg/act inhaler    atorvastatin (LIPITOR) 40 mg tablet    gabapentin (NEURONTIN) 300 mg capsule    latanoprost (XALATAN) 0.005 % ophthalmic solution    levothyroxine 25 mcg tablet    lisinopril (ZESTRIL) 20 mg tablet    metFORMIN (GLUCOPHAGE) 1000 MG tablet    metoprolol succinate (TOPROL-XL) 25 mg 24 hr tablet    Milnacipran HCl (Savella) 100 MG TABS    oxyCODONE-acetaminophen (PERCOCET)  mg per tablet    pantoprazole (PROTONIX) 40 mg tablet    trospium (SANCTURA XR) 60 mg 24 hr capsule    aspirin 81 mg chewable tablet    baclofen 10 mg tablet    Blood Glucose Monitoring Suppl (OneTouch Verio Reflect) w/Device KIT    cholestyramine (QUESTRAN) 4 g packet    escitalopram (Lexapro) 20 mg tablet    glucose blood (OneTouch Verio) test strip    hyoscyamine (ANASPAZ,LEVSIN) 0.125 MG tablet    meclizine (ANTIVERT) 25 mg tablet    metoclopramide (REGLAN) 10 mg tablet    nystatin (MYCOSTATIN) powder    OneTouch Delica Lancets 32Z MISC    saccharomyces boulardii (FLORASTOR) 250 mg capsule     heparin (porcine), 3-30 Units/kg/hr (Order-Specific), Last Rate: 15 Units/kg/hr (11/13/23 0239)    Counseling / Coordination of Care  Total floor / unit time spent today 20 minutes. Greater than 50% of total time was spent with the patient and / or family counseling and / or coordination of care. ** Please Note: Dragon 360 Dictation voice to text software may have been used in the creation of this document.  **

## 2023-11-13 NOTE — ASSESSMENT & PLAN NOTE
Patient presents from PCP office with chest pressure, SOB, lightheadedness and hypoxia with exertion, and tachycardia  At rest in the ED O2Sat mid 90s on RA. Desat to high 80s with ambulation and tachycardia  EKG sinus tachycardia with T wave inversion in lead III, prolonged Qtc  Last echo 2/23 showing 65% EF and normal systolic function. No aortic stenosis  CXR normal per my read. History of tobacco use, quit 40 years ago  History of falls in the past 3 months with unknown etiology. Seen by ENT in 9/23 - likely medication induced vs vascular issue. Vestibular therapy recommended  Elevated D-dimer 0.86. Troponin negative, BNP 25  SADAF with Cr 1.48 on admission  (baseline 0.8-1)  Creatinine improved 1.26   Patient gets chest pressure and desaturates when ambulating   No evidence of acute or chronic deep vein thrombosis VAS lower limb   Echo: LVEF 70%,LVOT obstruction noted with Valsalva, but not at rest.   V/Q scan shows low probability of PE  Cardiac cath: Diffuse calcified CAD. No focal significant lesions. 1st Mrg lesion is 70% stenosed. First Obtuse Marginal Branch 1st Mrg lesion is 70% stenosed. The vessel size is medium. LETY flow is 3. The lesion is non high-C and focal. Proximal vessel. iFR was measured in the 1st Mrg. iFR ratio: 0.95. The iFR was nonsignificant, not intervened.     Plan  Discontinue heparin gtt low probability of PE on VQ scan  cardiology consulted  Ambulatory pulse ox  Monitor on telemetry

## 2023-11-13 NOTE — PROGRESS NOTES
[id number] [facility]  Progress Note  Name: Syeda Schroeder  MRN: [id number]  Unit/Bed#: S -01 I Date of Admission: 11/10/2023   Date of Service: 11/14/2023 I Hospital Day: 4    Assessment/Plan   * Acute respiratory failure  Assessment & Plan  Patient presents from PCP office with chest pressure, SOB, lightheadedness and hypoxia with exertion, and tachycardia  At rest in the ED O2Sat mid 90s on RA. Desat to high 80s with ambulation and tachycardia  EKG sinus tachycardia with T wave inversion in lead III, prolonged Qtc  Last echo 2/23 showing 65% EF and normal systolic function. No aortic stenosis  CXR normal per my read. History of tobacco use, quit 40 years ago  History of falls in the past 3 months with unknown etiology. Seen by ENT in 9/23 - likely medication induced vs vascular issue. Vestibular therapy recommended  Elevated D-dimer 0.86. Troponin negative, BNP 25  SADAF with Cr 1.48 on admission  (baseline 0.8-1)  Creatinine improved 1.26   Patient gets chest pressure and desaturates when ambulating   No evidence of acute or chronic deep vein thrombosis VAS lower limb   Echo: LVEF 70%,LVOT obstruction noted with Valsalva, but not at rest.   V/Q scan shows low probability of PE    Plan  Discontinue heparin gtt low probability of PE on VQ scan  cardiology consulted due to chest pressure with ambulation and recommendations appreciated  Cardiac cath today-NPO since midnight  Follow-up cath result  Ambulatory pulse ox  Monitor on telemetry      Acute kidney injury [facility])  Assessment & Plan  POA Cr 1.48 with baseline 0.8-1. Possible due to decreased PO intake per daughter report    Lab Results   Component Value Date    CREATININE 0.90 11/14/2023    CREATININE 0.83 11/13/2023    CREATININE 1.13 11/12/2023    CREATININE 1.26 11/11/2023      Plan  Encourage PO intake  Hold PTA lisinopril in setting of SADAF  Repeat BMP.  Replete electrolytes prn    Hypertension  Assessment & Plan  Blood Pressure: 121/63   Continue metoprolol 25 twice daily  Hold lisinopril in setting of SADAF    History of lumbar surgery  Assessment & Plan  Prior thoracic and lumbar surgery, prior laminectomy, spinal cord stimulator in place (non-functional per daughter)    Plan  Continue PTA Percocet 10/325 mg Q12 prn  Continue PTA Savella  Continue PTA gabapentin 300 mg hs and baclofen prn    Ambulatory dysfunction  Assessment & Plan  Ambulates with walker at home. Frequent falls recently with admission on 8/26  PT/OT: recs Level 3    Plan  Orthostatic vital signs  Continue meclizine prn for dizziness    Anemia  Assessment & Plan  Hb 10.4, within baseline on 10-11, MCV normal. Likely anemia of chronic disease vs mixed anemia (micro+macro with averaging to normal MCV), no recent iron panel, B12, folate. Prior iron and ferritin low 3 years ago  B12 and Folate  wnl, iron-31, TIBC 331, iron saturation 9 , most likely iron deficiency anemia  Lab Results   Component Value Date    HGB 8.0 (L) 11/13/2023    HGB 9.7 (L) 11/12/2023    HGB 10.3 (L) 11/11/2023    HGB 10.4 (L) 11/10/2023        Plan  Started Ferrous sulfate 325 mg daily  Bowel regime to avoid constipation     Overactive bladder  Assessment & Plan  Start oxybutynin, replacement for home trospium per formulary    Hypothyroidism  Assessment & Plan  Continue PTA levothyroxine    Glaucoma  Assessment & Plan  Continue PTA latanoprost    Type 2 diabetes mellitus [facility])  Assessment & Plan  Lab Results   Component Value Date    HGBA1C 6.6 (H) 06/09/2023       Recent Labs     11/13/23  0814 11/13/23  1139 11/13/23  1641 11/13/23  2114   POCGLU 108 122 127 170*       Blood Sugar Average: Last 72 hrs:  (P) 262.1860976966369165  Well-controlled. Hold PTA metformin, initiate sliding scale  Accu-Chek             VTE Pharmacologic Prophylaxis: VTE Score: 6 High Risk (Score >/= 5) - Pharmacological DVT Prophylaxis Ordered: heparin drip. Sequential Compression Devices Ordered. Patient Centered Rounds:  I performed bedside rounds with nursing staff today. Discussions with Specialists or Other Care Team Provider: Cardiology    Education and Discussions with Family / Patient: Attempted to update  (daughter) via phone. Left voicemail. Pt's daughter is a doctor ( Family med). Current Length of Stay: 4 day(s)  Current Patient Status: Inpatient   Discharge Plan: Anticipate discharge in 24-48 hrs to home. Code Status: Level 1 - Full Code    Subjective:   I have examined the patient bedside this morning. Overnight: No ON events . She is AAOx3, denies any CP, SOB, abdominal pain, N/V/D/C, urinary symptoms. Reports mild headache, abdominal cramping. Last BM yesterday after rectal suppository and UO is normal. Pt is hemodynamical stable. She understands that she will have cardiac cath today. Objective:     Vitals:   Temp (24hrs), Av.2 °F (36.8 °C), Min:98.2 °F (36.8 °C), Max:98.2 °F (36.8 °C)    Temp:  [98.2 °F (36.8 °C)] 98.2 °F (36.8 °C)  HR:  [86-88] 88  Resp:  [16-18] 16  BP: (121-139)/(63-71) 139/71  SpO2:  [96 %-99 %] 98 %  Body mass index is 29.88 kg/m². Input and Output Summary (last 24 hours): Intake/Output Summary (Last 24 hours) at 2023 0833  Last data filed at 2023 0434  Gross per 24 hour   Intake --   Output 2100 ml   Net -2100 ml       Physical Exam:   Physical Exam  Vitals and nursing note reviewed. Constitutional:       General: She is not in acute distress. Appearance: She is well-developed. She is not toxic-appearing. Cardiovascular:      Rate and Rhythm: Normal rate and regular rhythm. Heart sounds: Murmur heard. Systolic murmur is present with a grade of 3/6. Pulmonary:      Effort: Pulmonary effort is normal. No respiratory distress. Breath sounds: Examination of the right-lower field reveals rales. Examination of the left-lower field reveals rales. Rales present. No wheezing.    Abdominal:      General: Bowel sounds are normal. There is no distension. Palpations: Abdomen is soft. Tenderness: There is no abdominal tenderness. Musculoskeletal:      Right lower leg: No edema. Left lower leg: No edema. Neurological:      Mental Status: She is alert and oriented to person, place, and time. Psychiatric:         Mood and Affect: Mood normal.          Additional Data:     Labs:  Results from last 7 days   Lab Units 11/14/23  0701 11/13/23  0343 11/12/23  0917   WBC Thousand/uL 5.41   < > 7.04   HEMOGLOBIN g/dL 9.5*   < > 9.7*   HEMATOCRIT % 31.0*   < > 32.6*   PLATELETS Thousands/uL 220   < > 304   NEUTROS PCT %  --   --  57   LYMPHS PCT %  --   --  26   MONOS PCT %  --   --  8   EOS PCT %  --   --  7*    < > = values in this interval not displayed. Results from last 7 days   Lab Units 11/14/23  0701 11/11/23  0833 11/10/23  1332   SODIUM mmol/L 135   < > 136   POTASSIUM mmol/L 5.0   < > 4.6   CHLORIDE mmol/L 105   < > 101   CO2 mmol/L 25   < > 25   BUN mg/dL 17   < > 33*   CREATININE mg/dL 0.90   < > 1.48*   ANION GAP mmol/L 5   < > 10   CALCIUM mg/dL 8.9   < > 9.3   ALBUMIN g/dL  --   --  3.7   TOTAL BILIRUBIN mg/dL  --   --  0.28   ALK PHOS U/L  --   --  84   ALT U/L  --   --  8   AST U/L  --   --  14   GLUCOSE RANDOM mg/dL 214*   < > 169*    < > = values in this interval not displayed.      Results from last 7 days   Lab Units 11/10/23  1332   INR  0.90     Results from last 7 days   Lab Units 11/14/23  0718 11/13/23  2114 11/13/23  1641 11/13/23  1139 11/13/23  0814 11/12/23  2109 11/12/23  1636 11/12/23  1120 11/12/23  0755 11/11/23  2057 11/11/23  1615 11/11/23  1224   POC GLUCOSE mg/dl 216* 170* 127 122 108 147* 136 142* 126 152* 161* 167*               Lines/Drains:  Invasive Devices       Peripheral Intravenous Line  Duration             Peripheral IV 11/10/23 Proximal;Right;Ventral (anterior) Antecubital 3 days    Long-Dwell Peripheral IV (Midline) 11/13/23 Right Brachial <1 day Telemetry:  Telemetry Orders (From admission, onward)               24 Hour Telemetry Monitoring  Continuous x 24 Hours (Telem)        Comments: Currently PE rule out   Question:  Reason for 24 Hour Telemetry  Answer:  Pulmonary Embolism                     Telemetry Reviewed: Normal Sinus Rhythm  Indication for Continued Telemetry Use: PE      Imaging: Reviewed radiology reports from this admission including: ultrasound(s)  XR chest portable   Final Result by Nia Richardson MD (11/13 1636)      No acute cardiopulmonary disease. Workstation performed: [id number]         NM lung ventilation / perfusion   Final Result by Al Lennox, MD (11/13 1416)      The probability for pulmonary embolus is very low. Workstation performed: [id number]         VAS lower limb venous duplex study, complete bilateral   Final Result by Norma Knott MD (11/11 1226)      XR chest 2 views   ED Interpretation by Mike Carlton MD (11/10 1435)   CXR with no acute cardiopulmonary abnormalities. Final Result by Pita Tamayo MD (11/10 1630)      No acute cardiopulmonary disease.                Workstation performed: [id number]             Recent Cultures (last 7 days):         Last 24 Hours Medication List:   Current Facility-Administered Medications   Medication Dose Route Frequency Provider Last Rate    acetaminophen  650 mg Oral Q6H PRN Arnulfo Haas MD      aluminum-magnesium hydroxide-simethicone  30 mL Oral Q4H PRN Karlie Cartagena DO      aspirin  324 mg Oral Once Shola CRNP      [START ON 11/15/2023] aspirin  81 mg Oral Daily [place], CRNP      atorvastatin  40 mg Oral Daily Christian Morrow MD      baclofen  10 mg Oral BID PRN Christian Morrow MD      bisacodyl  10 mg Rectal Daily PRN Nighat Bermudez MD      diphenhydrAMINE  50 mg Oral 60 Min Pre-Op [place], CRNP      escitalopram  10 mg Oral Daily Christian Morrow MD      ferrous sulfate  325 mg Oral Daily With Breakfast May Thu Jerald Betancourt MD      gabapentin  300 mg Oral HS Rosie Tran MD      insulin lispro  1-5 Units Subcutaneous 4x Daily (AC & HS) Rosie Tran MD      latanoprost  1 drop Both Eyes HS Rosie Tran, MD      levothyroxine  37.5 mcg Oral Daily Rosie Tran, MD      meclizine  12.5 mg Oral Q8H PRN Rosie Tran MD      melatonin  6 mg Oral HS Berkley Kelly DO      methylPREDNISolone  32 mg Oral Q10H NANY De La Cruz      metoprolol succinate  25 mg Oral Q12H NANY De La Cruz      Milnacipran HCl  1 tablet Oral Daily Rosie Tran MD      oxybutynin  10 mg Oral Daily Rosie Tran MD      oxyCODONE-acetaminophen  2 tablet Oral Q12H PRN Rosie Tran MD      pantoprazole  40 mg Oral Daily Rosie Tran MD      polyethylene glycol  17 g Oral Daily Diana eLe MD      senna-docusate sodium  2 tablet Oral HS Rosie Tran MD      sodium chloride  100 mL/hr Intravenous Continuous NANY De La Cruz             Today, Patient Was Seen By: Sugar Reynoso MD    **Please Note: This note may have been constructed using a voice recognition system. **

## 2023-11-13 NOTE — ASSESSMENT & PLAN NOTE
Hb 10.4, within baseline on 10-11, MCV normal. Likely anemia of chronic disease vs mixed anemia (micro+macro with averaging to normal MCV), no recent iron panel, B12, folate.  Prior iron and ferritin low 3 years ago  B12 and Folate  wnl, iron-31, TIBC 331, iron saturation 9 , most likely iron deficiency anemia  Lab Results   Component Value Date    HGB 8.6 (L) 11/15/2023    HGB 9.5 (L) 11/14/2023    HGB 9.4 (L) 11/13/2023    HGB 8.0 (L) 11/13/2023        Plan  Started Ferrous sulfate 325 mg daily  Bowel regime to avoid constipation

## 2023-11-13 NOTE — ASSESSMENT & PLAN NOTE
POA Cr 1.48 with baseline 0.8-1. Possible due to decreased PO intake per daughter report  SADAF resolved creatinine back to baseline    Lab Results   Component Value Date    CREATININE 0.90 11/14/2023    CREATININE 0.83 11/13/2023    CREATININE 1.13 11/12/2023    CREATININE 1.26 11/11/2023      Plan  Encourage PO intake  Hold PTA lisinopril for now can resume on discharge  Repeat BMP.  Replete electrolytes prn

## 2023-11-13 NOTE — ASSESSMENT & PLAN NOTE
Ambulates with walker at home.  Frequent falls recently with admission on 8/26  PT/OT: pattie Level 3    Plan  Orthostatic vital signs  Continue meclizine prn for dizziness

## 2023-11-14 PROBLEM — J96.01 ACUTE RESPIRATORY FAILURE WITH HYPOXIA (HCC): Status: ACTIVE | Noted: 2023-11-10

## 2023-11-14 LAB
ANION GAP SERPL CALCULATED.3IONS-SCNC: 5 MMOL/L
APTT PPP: 56 SECONDS (ref 23–37)
APTT PPP: 67 SECONDS (ref 23–37)
BUN SERPL-MCNC: 17 MG/DL (ref 5–25)
CALCIUM SERPL-MCNC: 8.9 MG/DL (ref 8.4–10.2)
CHLORIDE SERPL-SCNC: 105 MMOL/L (ref 96–108)
CO2 SERPL-SCNC: 25 MMOL/L (ref 21–32)
CREAT SERPL-MCNC: 0.9 MG/DL (ref 0.6–1.3)
ERYTHROCYTE [DISTWIDTH] IN BLOOD BY AUTOMATED COUNT: 14.7 % (ref 11.6–15.1)
GFR SERPL CREATININE-BSD FRML MDRD: 60 ML/MIN/1.73SQ M
GLUCOSE SERPL-MCNC: 173 MG/DL (ref 65–140)
GLUCOSE SERPL-MCNC: 210 MG/DL (ref 65–140)
GLUCOSE SERPL-MCNC: 214 MG/DL (ref 65–140)
GLUCOSE SERPL-MCNC: 215 MG/DL (ref 65–140)
GLUCOSE SERPL-MCNC: 216 MG/DL (ref 65–140)
HCT VFR BLD AUTO: 31 % (ref 34.8–46.1)
HGB BLD-MCNC: 9.5 G/DL (ref 11.5–15.4)
MCH RBC QN AUTO: 28.4 PG (ref 26.8–34.3)
MCHC RBC AUTO-ENTMCNC: 30.6 G/DL (ref 31.4–37.4)
MCV RBC AUTO: 93 FL (ref 82–98)
PLATELET # BLD AUTO: 220 THOUSANDS/UL (ref 149–390)
PMV BLD AUTO: 10.8 FL (ref 8.9–12.7)
POTASSIUM SERPL-SCNC: 5 MMOL/L (ref 3.5–5.3)
RBC # BLD AUTO: 3.35 MILLION/UL (ref 3.81–5.12)
SODIUM SERPL-SCNC: 135 MMOL/L (ref 135–147)
WBC # BLD AUTO: 5.41 THOUSAND/UL (ref 4.31–10.16)

## 2023-11-14 PROCEDURE — 93571 IV DOP VEL&/PRESS C FLO 1ST: CPT | Performed by: INTERNAL MEDICINE

## 2023-11-14 PROCEDURE — 97116 GAIT TRAINING THERAPY: CPT

## 2023-11-14 PROCEDURE — 80048 BASIC METABOLIC PNL TOTAL CA: CPT | Performed by: NURSE PRACTITIONER

## 2023-11-14 PROCEDURE — 93005 ELECTROCARDIOGRAM TRACING: CPT

## 2023-11-14 PROCEDURE — C1887 CATHETER, GUIDING: HCPCS | Performed by: INTERNAL MEDICINE

## 2023-11-14 PROCEDURE — 99232 SBSQ HOSP IP/OBS MODERATE 35: CPT | Performed by: INTERNAL MEDICINE

## 2023-11-14 PROCEDURE — 82948 REAGENT STRIP/BLOOD GLUCOSE: CPT

## 2023-11-14 PROCEDURE — 99152 MOD SED SAME PHYS/QHP 5/>YRS: CPT | Performed by: INTERNAL MEDICINE

## 2023-11-14 PROCEDURE — NC001 PR NO CHARGE: Performed by: INTERNAL MEDICINE

## 2023-11-14 PROCEDURE — 93454 CORONARY ARTERY ANGIO S&I: CPT | Performed by: INTERNAL MEDICINE

## 2023-11-14 PROCEDURE — C1769 GUIDE WIRE: HCPCS | Performed by: INTERNAL MEDICINE

## 2023-11-14 PROCEDURE — 85730 THROMBOPLASTIN TIME PARTIAL: CPT | Performed by: INTERNAL MEDICINE

## 2023-11-14 PROCEDURE — 99153 MOD SED SAME PHYS/QHP EA: CPT | Performed by: INTERNAL MEDICINE

## 2023-11-14 PROCEDURE — C1760 CLOSURE DEV, VASC: HCPCS | Performed by: INTERNAL MEDICINE

## 2023-11-14 PROCEDURE — 85027 COMPLETE CBC AUTOMATED: CPT

## 2023-11-14 PROCEDURE — C1894 INTRO/SHEATH, NON-LASER: HCPCS | Performed by: INTERNAL MEDICINE

## 2023-11-14 DEVICE — ANGIO-SEAL VIP VASCULAR CLOSURE DEVICE
Type: IMPLANTABLE DEVICE | Status: FUNCTIONAL
Brand: ANGIO-SEAL

## 2023-11-14 RX ORDER — RANOLAZINE 500 MG/1
500 TABLET, EXTENDED RELEASE ORAL EVERY 12 HOURS SCHEDULED
Status: DISCONTINUED | OUTPATIENT
Start: 2023-11-14 | End: 2023-11-15 | Stop reason: HOSPADM

## 2023-11-14 RX ORDER — METOPROLOL SUCCINATE 50 MG/1
50 TABLET, EXTENDED RELEASE ORAL EVERY 12 HOURS SCHEDULED
Status: DISCONTINUED | OUTPATIENT
Start: 2023-11-14 | End: 2023-11-15 | Stop reason: HOSPADM

## 2023-11-14 RX ORDER — FENTANYL CITRATE 50 UG/ML
INJECTION, SOLUTION INTRAMUSCULAR; INTRAVENOUS CODE/TRAUMA/SEDATION MEDICATION
Status: DISCONTINUED | OUTPATIENT
Start: 2023-11-14 | End: 2023-11-14 | Stop reason: HOSPADM

## 2023-11-14 RX ORDER — HEPARIN SODIUM 1000 [USP'U]/ML
INJECTION, SOLUTION INTRAVENOUS; SUBCUTANEOUS CODE/TRAUMA/SEDATION MEDICATION
Status: DISCONTINUED | OUTPATIENT
Start: 2023-11-14 | End: 2023-11-14 | Stop reason: HOSPADM

## 2023-11-14 RX ORDER — LIDOCAINE HYDROCHLORIDE 10 MG/ML
INJECTION, SOLUTION EPIDURAL; INFILTRATION; INTRACAUDAL; PERINEURAL CODE/TRAUMA/SEDATION MEDICATION
Status: DISCONTINUED | OUTPATIENT
Start: 2023-11-14 | End: 2023-11-14 | Stop reason: HOSPADM

## 2023-11-14 RX ORDER — MIDAZOLAM HYDROCHLORIDE 2 MG/2ML
INJECTION, SOLUTION INTRAMUSCULAR; INTRAVENOUS CODE/TRAUMA/SEDATION MEDICATION
Status: DISCONTINUED | OUTPATIENT
Start: 2023-11-14 | End: 2023-11-14 | Stop reason: HOSPADM

## 2023-11-14 RX ORDER — SODIUM CHLORIDE 9 MG/ML
50 INJECTION, SOLUTION INTRAVENOUS CONTINUOUS
Status: DISPENSED | OUTPATIENT
Start: 2023-11-14 | End: 2023-11-14

## 2023-11-14 RX ADMIN — RANOLAZINE 500 MG: 500 TABLET, FILM COATED, EXTENDED RELEASE ORAL at 13:12

## 2023-11-14 RX ADMIN — METHYLPREDNISOLONE 32 MG: 16 TABLET ORAL at 09:33

## 2023-11-14 RX ADMIN — Medication 6 MG: at 22:29

## 2023-11-14 RX ADMIN — SODIUM CHLORIDE 50 ML/HR: 0.9 INJECTION, SOLUTION INTRAVENOUS at 11:26

## 2023-11-14 RX ADMIN — ATORVASTATIN CALCIUM 40 MG: 40 TABLET, FILM COATED ORAL at 08:35

## 2023-11-14 RX ADMIN — ASPIRIN 81 MG CHEWABLE TABLET 324 MG: 81 TABLET CHEWABLE at 08:35

## 2023-11-14 RX ADMIN — INSULIN LISPRO 2 UNITS: 100 INJECTION, SOLUTION INTRAVENOUS; SUBCUTANEOUS at 16:38

## 2023-11-14 RX ADMIN — INSULIN LISPRO 2 UNITS: 100 INJECTION, SOLUTION INTRAVENOUS; SUBCUTANEOUS at 22:28

## 2023-11-14 RX ADMIN — HEPARIN SODIUM 13 UNITS/KG/HR: 10000 INJECTION, SOLUTION INTRAVENOUS at 06:30

## 2023-11-14 RX ADMIN — OXYBUTYNIN CHLORIDE 10 MG: 5 TABLET, EXTENDED RELEASE ORAL at 08:35

## 2023-11-14 RX ADMIN — RANOLAZINE 500 MG: 500 TABLET, FILM COATED, EXTENDED RELEASE ORAL at 22:29

## 2023-11-14 RX ADMIN — METOPROLOL SUCCINATE 25 MG: 25 TABLET, EXTENDED RELEASE ORAL at 08:35

## 2023-11-14 RX ADMIN — ACETAMINOPHEN 650 MG: 325 TABLET, FILM COATED ORAL at 00:53

## 2023-11-14 RX ADMIN — DIPHENHYDRAMINE HYDROCHLORIDE 50 MG: 25 TABLET ORAL at 08:48

## 2023-11-14 RX ADMIN — SODIUM CHLORIDE 100 ML/HR: 0.9 INJECTION, SOLUTION INTRAVENOUS at 08:52

## 2023-11-14 RX ADMIN — HEPARIN SODIUM 2600 UNITS: 1000 INJECTION INTRAVENOUS; SUBCUTANEOUS at 08:26

## 2023-11-14 RX ADMIN — ESCITALOPRAM OXALATE 10 MG: 10 TABLET ORAL at 08:35

## 2023-11-14 RX ADMIN — INSULIN LISPRO 1 UNITS: 100 INJECTION, SOLUTION INTRAVENOUS; SUBCUTANEOUS at 12:40

## 2023-11-14 RX ADMIN — PANTOPRAZOLE SODIUM 40 MG: 40 TABLET, DELAYED RELEASE ORAL at 08:35

## 2023-11-14 RX ADMIN — LATANOPROST 1 DROP: 50 SOLUTION OPHTHALMIC at 22:28

## 2023-11-14 RX ADMIN — METOPROLOL SUCCINATE 50 MG: 50 TABLET, EXTENDED RELEASE ORAL at 22:29

## 2023-11-14 RX ADMIN — SENNOSIDES, DOCUSATE SODIUM 2 TABLET: 8.6; 5 TABLET ORAL at 22:29

## 2023-11-14 RX ADMIN — MILNACIPRAN HYDROCHLORIDE 100 MG: 100 TABLET, FILM COATED ORAL at 08:39

## 2023-11-14 RX ADMIN — FERROUS SULFATE TAB 325 MG (65 MG ELEMENTAL FE) 325 MG: 325 (65 FE) TAB at 08:35

## 2023-11-14 RX ADMIN — GABAPENTIN 300 MG: 300 CAPSULE ORAL at 22:29

## 2023-11-14 RX ADMIN — LEVOTHYROXINE SODIUM 37.5 MCG: 75 TABLET ORAL at 08:35

## 2023-11-14 NOTE — PLAN OF CARE
Problem: PHYSICAL THERAPY ADULT  Goal: Performs mobility at highest level of function for planned discharge setting. See evaluation for individualized goals. Description: Treatment/Interventions: Functional transfer training, LE strengthening/ROM, Elevations, Therapeutic exercise, Endurance training, Cognitive reorientation, Patient/family training, Equipment eval/education, Bed mobility, Gait training, Compensatory technique education          See flowsheet documentation for full assessment, interventions and recommendations. Outcome: Progressing  Note: Prognosis: Good  Problem List: Decreased endurance, Impaired balance, Decreased mobility, Decreased cognition, Decreased safety awareness, Impaired judgement  Assessment: Pt agrees to PT treatment after encouragement. Pt reporting significant fatigue. Continues to require supervision for bed mobility, sit to stand transfers, and ambulation with rollator. Limited distance this session due to fatigue. Will continue to benefit from ongoing skilled PT to maximize her functional mobility and increase her level of independence. Rehab Resource Intensity Level, PT: III (Minimum Resource Intensity)    See flowsheet documentation for full assessment.

## 2023-11-14 NOTE — DISCHARGE SUMMARY
[address] [facility]  [facility] 1945, 78 y.o. female MRN: [id number]  Unit/Bed#: S -01 Encounter: [id number]  Primary Care Provider: NANY Musa   Date and time admitted to hospital: 11/10/2023 12:22 PM      * Non obstructive CAD  Assessment & Plan  Cardiac cath: Diffuse calcified CAD. No focal significant lesions. 1st Mrg lesion is 70% stenosed. First Obtuse Marginal Branch 1st Mrg lesion is 70% stenosed. The vessel size is medium. LETY flow is 3. The lesion is non high-C and focal. Proximal vessel. iFR was measured in the 1st Mrg. iFR ratio: 0.95. The iFR was nonsignificant, not intervened. Plan:  Inc Metoprolol to 50 BID  Started Renexa 500 q12h  Continue ASA and statin  Discontinue lisinopril  Follow-up cardiology as outpatient  Medically stable and cleared for discharge    Acute respiratory failure with hypoxia (HCC)-resolved as of 11/15/2023  Assessment & Plan  Patient presents from PCP office with chest pressure, SOB, lightheadedness and hypoxia with exertion, and tachycardia  At rest in the ED O2Sat mid 90s on RA. Desat to high 80s with ambulation and tachycardia  EKG sinus tachycardia with T wave inversion in lead III, prolonged Qtc  Last echo 2/23 showing 65% EF and normal systolic function. No aortic stenosis  CXR normal per my read. History of tobacco use, quit 40 years ago  History of falls in the past 3 months with unknown etiology. Seen by ENT in 9/23 - likely medication induced vs vascular issue. Vestibular therapy recommended  Elevated D-dimer 0.86. Troponin negative, BNP 25  SADAF with Cr 1.48 on admission  (baseline 0.8-1)  Creatinine improved 1.26   Patient gets chest pressure and desaturates when ambulating   No evidence of acute or chronic deep vein thrombosis VAS lower limb   Echo: LVEF 70%,LVOT obstruction noted with Valsalva, but not at rest.   V/Q scan shows low probability of PE  Cardiac cath: Diffuse calcified CAD.  No focal significant lesions. 1st Mrg lesion is 70% stenosed. First Obtuse Marginal Branch 1st Mrg lesion is 70% stenosed. The vessel size is medium. LETY flow is 3. The lesion is non high-C and focal. Proximal vessel. iFR was measured in the 1st Mrg. iFR ratio: 0.95. The iFR was nonsignificant, not intervened. Plan  Discontinue heparin gtt low probability of PE on VQ scan  cardiology consulted  Ambulatory pulse ox  Monitor on telemetry      Acute kidney injury ([address])  Assessment & Plan  POA Cr 1.48 with baseline 0.8-1. Possible due to decreased PO intake per daughter report    Lab Results   Component Value Date    CREATININE 0.90 11/14/2023    CREATININE 0.83 11/13/2023    CREATININE 1.13 11/12/2023    CREATININE 1.26 11/11/2023      Plan  Encourage PO intake  Cr improved. Discontinue lisinopril  Repeat BMP after 1 week. Avoid nephrotoxic agents    Hypertension  Assessment & Plan  Blood Pressure: 121/63   Continue metoprolol 25 twice daily  Discontinue lisinopril     History of lumbar surgery  Assessment & Plan  Prior thoracic and lumbar surgery, prior laminectomy, spinal cord stimulator in place (non-functional per daughter)    Plan  Continue PTA Percocet 10/325 mg Q12 prn  Continue PTA Savella  Continue PTA gabapentin 300 mg hs and baclofen prn    Ambulatory dysfunction  Assessment & Plan  Ambulates with walker at home. Frequent falls recently with admission on 8/26  PT/OT: recs Level 3    Plan  Continue meclizine prn for dizziness    Anemia  Assessment & Plan  Hb 10.4, within baseline on 10-11, MCV normal. Likely anemia of chronic disease vs mixed anemia (micro+macro with averaging to normal MCV), no recent iron panel, B12, folate.  Prior iron and ferritin low 3 years ago  B12 and Folate  wnl, iron-31, TIBC 331, iron saturation 9 , most likely iron deficiency anemia  Lab Results   Component Value Date    HGB 8.0 (L) 11/13/2023    HGB 9.7 (L) 11/12/2023    HGB 10.3 (L) 11/11/2023    HGB 10.4 (L) 11/10/2023 Plan  Started Ferrous sulfate 325 mg daily  Use OTC stool softner to avoid constipation     Overactive bladder  Assessment & Plan  Continue home trospium per formulary    Hypothyroidism  Assessment & Plan  Continue PTA levothyroxine    Glaucoma  Assessment & Plan  Continue PTA latanoprost    Type 2 diabetes mellitus ([address])  Assessment & Plan  Lab Results   Component Value Date    HGBA1C 6.6 (H) 06/09/2023       Recent Labs     11/13/23  0814 11/13/23  1139 11/13/23  1641 11/13/23  2114   POCGLU 108 122 127 170*       Blood Sugar Average: Last 72 hrs:  (P) 690.0911821815597416  Continue PTA metformin      Medical Problems       Resolved Problems  Date Reviewed: 11/14/2023   None       Discharging Resident: Yael Riley MD  Discharging Attending: Padmini Melo MD  PCP: Mitchell Cunningham, [address] [place] [address]  Admission Date:   Admission Orders (From admission, onward)       Ordered        11/10/23 [place]  Once                          Discharge Date: 11/14/23    Consultations During Hospital Stay:  Cardiology    Procedures Performed:   Cardiac catheterization    Significant Findings / Test Results:   XR chest portable   Final Result by Makenzie Grossman MD (11/13 1636)      No acute cardiopulmonary disease. Workstation performed: [id number]         NM lung ventilation / perfusion   Final Result by Merlin Salt, MD (11/13 1416)      The probability for pulmonary embolus is very low. Workstation performed: [id number]         VAS lower limb venous duplex study, complete bilateral   Final Result by Reina Cheadle, MD (11/11 1226)      XR chest 2 views   ED Interpretation by Bubba Lay MD (11/10 1435)   CXR with no acute cardiopulmonary abnormalities. Final Result by Priscilla Aguilar MD (11/10 1630)      No acute cardiopulmonary disease. Workstation performed: [id number]           Cardiac Cath:    Diffuse calcified CAD. No focal significant lesions.     1st Mrg lesion is 70% stenosed. First Obtuse Marginal Branch 1st Mrg lesion is 70% stenosed. The vessel size is medium. LETY flow is 3. The lesion is non high-C and focal. Proximal vessel. iFR was measured in the 1st Mrg. iFR ratio: 0.95. The iFR was nonsignificant, not intervened. Results for orders placed during the hospital encounter of 11/10/23    Echo complete w/ contrast if indicated    Interpretation Summary    Left Ventricle: Wall thickness is not well visualized. The left ventricular ejection fraction is 70%. Systolic function is hyperdynamic. Although no diagnostic regional wall motion abnormality was identified, this possibility cannot be completely excluded on the basis of this study. Diastolic function is mildly abnormal, consistent with grade I (abnormal) relaxation. There is outflow tract dynamic obstruction with valsalva with a peak gradient of 117 mmHg. Right Ventricle: Right ventricular cavity size is normal. Systolic function is normal.    Left Atrium: The atrium is mildly dilated. Mitral Valve: There is mild annular calcification. LVOT obstruction noted with Valsalva, but not at rest. No other typical features of HCM are seen. LV appears small, underfilled. Incidental Findings:   None    Test Results Pending at Discharge (will require follow up): None      Outpatient Tests Requested:  None    Complications: None    Reason for Admission: Chest pain-to rule out PE versus cardiac pathology    Hospital Course: Cornell Carrillo is a 78 y.o. female with a PMH of HTN, HLD, T2DM, OAB, hypothyroidism, prior lumbar and thoracic surgery, chronic pain  patient who originally presented to the hospital on 11/10/2023 due to hypoxia SpO2 in 70s, HR 160s with ambulation, chest pressure, palpitations, lightheadedness. Initially she was put on 5 L NC which progressively weaned off on presentation she was her D-dimer was modestly elevated with normal trops, BNP.   Considering her contrast allergies she underwent a VQ scan which ruled out pulmonary embolism. She had cardiac catheterization which showed results as above. she had a SADAF on presentation which was treated with gentle hydration and lisinopril was on hold. Please see above list of diagnoses and related plan for additional information. Condition at Discharge: fair    Discharge Day Visit / Exam:   Subjective: I have examined the patient bedside this morning. Overnight: No ON events . She is AAOx3, denies any CP, SOB, abdominal pain, N/V/D/C, urinary symptoms. Last BM yesterday and UO is normal. Pt is hemodynamical stable. She is feeling fine after cath and no new complaints and she is clear for discharge. Vitals: Blood Pressure: 106/52 (11/14/23 1505)  Pulse: 70 (11/14/23 1505)  Temperature: (!) 97.2 °F (36.2 °C) (11/14/23 1505)  Temp Source: Axillary (11/14/23 1505)  Respirations: 12 (11/14/23 1150)  Height: 5' (152.4 cm) (11/12/23 0950)  Weight - Scale: 69.4 kg (153 lb) (11/12/23 0950)  SpO2: 97 % (11/14/23 1505)  Exam:      Physical Exam  Vitals and nursing note reviewed. Constitutional:       General: She is not in acute distress. Appearance: She is well-developed. She is not toxic-appearing. Cardiovascular:      Rate and Rhythm: Normal rate and regular rhythm. Pulmonary:      Effort: Pulmonary effort is normal. No respiratory distress. Breath sounds: No wheezing or rales. Abdominal:      General: Bowel sounds are normal. There is no distension. Palpations: Abdomen is soft. Tenderness: There is no abdominal tenderness. Musculoskeletal:      Right lower leg: No edema. Left lower leg: No edema. Neurological:      Mental Status: She is alert and oriented to person, place, and time. Psychiatric:         Mood and Affect: Mood normal.     Discussion with Family: Updated  (daughter) via phone.     Discharge instructions/Information to patient and family:   See after visit summary for information provided to patient and family. Provisions for Follow-Up Care:  See after visit summary for information related to follow-up care and any pertinent home health orders. Mobility at time of Discharge:   Basic Mobility Inpatient Raw Score: 18  JH-HLM Goal: 6: Walk 10 steps or more  JH-HLM Achieved: 7: Walk 25 feet or more  HLM Goal achieved. Continue to encourage appropriate mobility. Disposition:   Home with VNA Services (Reminder: Complete face to face encounter)    Planned Readmission: None    Discharge Medications:  See after visit summary for reconciled discharge medications provided to patient and/or family.       **Please Note: This note may have been constructed using a voice recognition system**

## 2023-11-14 NOTE — PLAN OF CARE
Problem: Potential for Falls  Goal: Patient will remain free of falls  Description: INTERVENTIONS:  - Educate patient/family on patient safety including physical limitations  - Instruct patient to call for assistance with activity   - Consult OT/PT to assist with strengthening/mobility   - Keep Call bell within reach  - Keep bed low and locked with side rails adjusted as appropriate  - Keep care items and personal belongings within reach  - Initiate and maintain comfort rounds  - Make Fall Risk Sign visible to staff  - Offer Toileting every 2 Hours, in advance of need  - Initiate/Maintain alarm  - Obtain necessary fall risk management equipment:   - Apply yellow socks and bracelet for high fall risk patients  - Consider moving patient to room near nurses station  Outcome: Progressing     Problem: MOBILITY - ADULT  Goal: Maintain or return to baseline ADL function  Description: INTERVENTIONS:  -  Assess patient's ability to carry out ADLs; assess patient's baseline for ADL function and identify physical deficits which impact ability to perform ADLs (bathing, care of mouth/teeth, toileting, grooming, dressing, etc.)  - Assess/evaluate cause of self-care deficits   - Assess range of motion  - Assess patient's mobility; develop plan if impaired  - Assess patient's need for assistive devices and provide as appropriate  - Encourage maximum independence but intervene and supervise when necessary  - Involve family in performance of ADLs  - Assess for home care needs following discharge   - Consider OT consult to assist with ADL evaluation and planning for discharge  - Provide patient education as appropriate  Outcome: Progressing  Goal: Maintains/Returns to pre admission functional level  Description: INTERVENTIONS:  - Perform BMAT or MOVE assessment daily.   - Set and communicate daily mobility goal to care team and patient/family/caregiver.    - Collaborate with rehabilitation services on mobility goals if consulted  - Perform Range of Motion 2 times a day. - Reposition patient every 2 hours.   - Dangle patient 2 times a day  - Stand patient 2 times a day  - Ambulate patient 2 times a day  - Out of bed to chair 2 times a day   - Out of bed for meals 2 times a day  - Out of bed for toileting  - Record patient progress and toleration of activity level   Outcome: Progressing     Problem: Prexisting or High Potential for Compromised Skin Integrity  Goal: Skin integrity is maintained or improved  Description: INTERVENTIONS:  - Identify patients at risk for skin breakdown  - Assess and monitor skin integrity  - Assess and monitor nutrition and hydration status  - Monitor labs   - Assess for incontinence   - Turn and reposition patient  - Assist with mobility/ambulation  - Relieve pressure over bony prominences  - Avoid friction and shearing  - Provide appropriate hygiene as needed including keeping skin clean and dry  - Evaluate need for skin moisturizer/barrier cream  - Collaborate with interdisciplinary team   - Patient/family teaching  - Consider wound care consult   Outcome: Progressing

## 2023-11-14 NOTE — PHYSICAL THERAPY NOTE
PHYSICAL THERAPY NOTE    Patient Name: Franko Macias  GSUGL'B Date: 2023 1551   PT Last Visit   PT Visit Date 23   Note Type   Note Type Treatment   Pain Assessment   Pain Assessment Tool 0-10   Pain Score No Pain   Restrictions/Precautions   Weight Bearing Precautions Per Order No   Other Precautions Cognitive; Chair Alarm; Bed Alarm; Fall Risk;Multiple lines;O2  (2 L O2 NC)   General   Chart Reviewed Yes   Additional Pertinent History Per chart: Cardiac cath performed via the RFA. Closed with angioseal.  (Pt's bedrest  13:20 per RN)   Response to Previous Treatment Patient reporting fatigue but able to participate. Family/Caregiver Present No   Cognition   Overall Cognitive Status Impaired   Arousal/Participation Alert; Cooperative   Attention Attends with cues to redirect   Comments Pt identified by name and . Subjective   Subjective Agrees to PT treatment after encouragement. Bed Mobility   Supine to Sit 5  Supervision   Additional items HOB elevated; Bedrails; Increased time required;Verbal cues   Sit to Supine Unable to assess  (left OOB in chair post session with alarm intact)   Transfers   Sit to Stand 5  Supervision   Additional items Increased time required;Verbal cues   Stand to Sit 5  Supervision   Additional items Increased time required;Verbal cues   Ambulation/Elevation   Gait pattern Improper Weight shift; Forward Flexion;Decreased foot clearance; Wide RUBIN; Short stride; Excessively slow   Gait Assistance 5  Supervision   Additional items Verbal cues   Assistive Device   (pt's rollator)   Distance ~20` x2   Balance   Static Sitting Good   Dynamic Sitting Fair +   Static Standing Fair   Dynamic Standing Fair   Ambulatory Fair -   Endurance Deficit   Endurance Deficit Yes   Endurance Deficit Description limited ambulation distance, fatigue   Activity Tolerance   Activity Tolerance Patient limited by fatigue   Nurse Made Aware Per RN, pt appropriate to treat   Assessment   Problem List Decreased endurance; Impaired balance;Decreased mobility; Decreased cognition;Decreased safety awareness; Impaired judgement   Assessment Pt agrees to PT treatment after encouragement. Pt reporting significant fatigue. Continues to require supervision for bed mobility, sit to stand transfers, and ambulation with rollator. Limited distance this session due to fatigue. Will continue to benefit from ongoing skilled PT to maximize her functional mobility and increase her level of independence. Goals   Patient Goals to get some sleep   STG Expiration Date 11/22/23   PT Treatment Day 1   Plan   Treatment/Interventions Functional transfer training;LE strengthening/ROM; Therapeutic exercise; Endurance training;Patient/family training;Equipment eval/education; Bed mobility;Gait training;Elevations   Progress Slow progress, decreased activity tolerance   PT Frequency 3-5x/wk   Discharge Recommendation   Rehab Resource Intensity Level, PT III (Minimum Resource Intensity)   Equipment Recommended   (pt owns a rollator)   AM-PAC Basic Mobility Inpatient   Turning in Flat Bed Without Bedrails 3   Lying on Back to Sitting on Edge of Flat Bed Without Bedrails 3   Moving Bed to Chair 3   Standing Up From Chair Using Arms 3   Walk in Room 3   Climb 3-5 Stairs With Railing 3   Basic Mobility Inpatient Raw Score 18   Basic Mobility Standardized Score 41.05   Highest Level Of Mobility   JH-HLM Goal 6: Walk 10 steps or more   JH-HLM Achieved 7: Walk 25 feet or more   Education   Education Provided Mobility training;Assistive device   Patient Reinforcement needed   End of Consult   Patient Position at End of Consult Bedside chair;Bed/Chair alarm activated; All needs within reach     The patient's AM-PAC Basic Mobility Inpatient Short Form Raw Score is 18. A Raw score of greater than 16 suggests the patient may benefit from discharge to home.      However please refer to therapist recommendation for discharge planning given other factors that may influence destination. Adapted from [facility] “6-Clicks” Basic Mobility and Daily Activity Scores With Discharge Destination. Physical Therapy, 2021;101:1-9.  DOI: [id number]    Giacomo Jimenez

## 2023-11-14 NOTE — PROGRESS NOTES
General Cardiology   Progress Note -  Team One    Every 78 y.o. female MRN: [id number]  Unit/Bed#: S -01 Encounter: [id number]    Assessment     Exertional shortness of breath  Chest pressure and exertional SOB for some time prior to admission  Symptoms resolve with rest  Calcified LAD noted on prior CT scan  BNP 25. No signs of heart failure, likely hypovolemic on admission with SADAF and under filled LV on echo. D Dimer 0.86- V/Q scan 11/13- very low probability of PE  TTE 11/12: LV hyperdynamic, EF 14%, grade 1 diastolic dysfunction, dynamic LVOT obstruction with Valsalva with a peak gradient of 117 mmHg. RV size/function normal and no significant valve disease. SADAF, resolved- improved with IVF, creatinine 1.48 on admission now 0.90     HTN-stable, last /71. Home lisinopril 20 mg daily is held given SADAF on admission. She has been continued on metoprolol succinate 25 mg daily. HLD- , triglycerides 84, HDL 50, LDL 62 on atorvastatin 40 mg daily     Type 2 DM-A1c 6.6% in June 2023     Hypothyroidism-TSH WNL in June     Anemia-hemoglobin back to baseline today, 9.5     Plan/discussion  NPO for cardiac catheterization today. Risks/benefits discussed and patient is agreeable. Prep given pre cath due to hx of IV dye allergy (hives)  Continue aspirin, statin, beta-blocker  Continue to hold lisinopril for now   Continue to monitor on telemetry  A.m. BMP    Subjective  No further chest pain or SOB but has not been walking much over past 24 hours. Review of Systems   Constitutional: Negative for chills, malaise/fatigue and weight gain. Cardiovascular:  Negative for chest pain, dyspnea on exertion, leg swelling, orthopnea, palpitations and syncope. Respiratory:  Negative for cough, shortness of breath, sleep disturbances due to breathing and sputum production. Gastrointestinal:  Negative for bloating, nausea and vomiting.    Neurological:  Negative for dizziness, light-headedness and weakness. Psychiatric/Behavioral:  Negative for altered mental status. All other systems reviewed and are negative. Objective:   Vitals: Blood pressure 139/71, pulse 80, temperature 98.2 °F (36.8 °C), resp. rate 16, height 5' (1.524 m), weight 69.4 kg (153 lb), SpO2 98 %, not currently breastfeeding., Body mass index is 29.88 kg/m². ,     Systolic (28OOY), HDI:486 , Min:121 , NPV:028     Diastolic (71MGV), GJJ:23, Min:63, Max:71    Intake/Output Summary (Last 24 hours) at 11/14/2023 1011  Last data filed at 11/14/2023 0434  Gross per 24 hour   Intake --   Output 2100 ml   Net -2100 ml     Wt Readings from Last 3 Encounters:   11/12/23 69.4 kg (153 lb)   09/13/23 69.4 kg (153 lb)   08/28/23 69.4 kg (153 lb)     Telemetry Review: NSR    Physical Exam  Vitals reviewed. Constitutional:       General: She is not in acute distress. Neck:      Vascular: No hepatojugular reflux or JVD. Cardiovascular:      Rate and Rhythm: Normal rate and regular rhythm. Heart sounds: Murmur heard. Systolic murmur is present. No friction rub. No gallop. Pulmonary:      Effort: Pulmonary effort is normal. No respiratory distress. Breath sounds: No rales. Abdominal:      General: Bowel sounds are normal. There is no distension. Palpations: Abdomen is soft. Tenderness: There is no abdominal tenderness. Musculoskeletal:         General: No tenderness. Normal range of motion. Cervical back: Neck supple. Right lower leg: No edema. Left lower leg: No edema. Skin:     General: Skin is warm and dry. Findings: No erythema. Neurological:      Mental Status: She is alert and oriented to person, place, and time.    Psychiatric:         Mood and Affect: Mood normal.       LABORATORY RESULTS      CBC with diff:   Results from last 7 days   Lab Units 11/14/23  0701 11/13/23  1717 11/13/23  0343 11/12/23  0917 11/11/23  0833 11/10/23  2240 11/10/23  1332   WBC Thousand/uL 5.41  -- 5. 93 7.04 8.36  --  9.55   HEMOGLOBIN g/dL 9.5* 9.4* 8.0* 9.7* 10.3*  --  10.4*   HEMATOCRIT % 31.0*  --  26.4* 32.6* 33.8*  --  33.6*   MCV fL 93  --  95 96 91  --  92   PLATELETS Thousands/uL 220  --  178 304 265 281 263   RBC Million/uL 3.35*  --  2.78* 3.41* 3.71*  --  3.65*   MCH pg 28.4  --  28.8 28.4 27.8  --  28.5   MCHC g/dL 30.6*  --  30.3* 29.8* 30.5*  --  31.0*   RDW % 14.7  --  14.7 15.1 14.9  --  14.6   MPV fL 10.8  --  10.8 11.2 11.1 10.8 11.0   NRBC AUTO /100 WBCs  --   --   --  1 0  --  0       CMP:  Results from last 7 days   Lab Units 11/14/23  0701 11/13/23  0343 11/12/23  0917 11/11/23  0833 11/10/23  1332   POTASSIUM mmol/L 5.0 4.0 4.6 4.4 4.6   CHLORIDE mmol/L 105 113* 106 104 101   CO2 mmol/L 25 21 25 26 25   BUN mg/dL 17 16 23 27* 33*   CREATININE mg/dL 0.90 0.83 1.13 1.26 1.48*   CALCIUM mg/dL 8.9 7.1* 9.0 9.1 9.3   AST U/L  --   --   --   --  14   ALT U/L  --   --   --   --  8   ALK PHOS U/L  --   --   --   --  84   EGFR ml/min/1.73sq m 60 66 46 40 33       BMP:  Results from last 7 days   Lab Units 11/14/23  0701 11/13/23  0343 11/12/23  0917 11/11/23  0833 11/10/23  1332   POTASSIUM mmol/L 5.0 4.0 4.6 4.4 4.6   CHLORIDE mmol/L 105 113* 106 104 101   CO2 mmol/L 25 21 25 26 25   BUN mg/dL 17 16 23 27* 33*   CREATININE mg/dL 0.90 0.83 1.13 1.26 1.48*   CALCIUM mg/dL 8.9 7.1* 9.0 9.1 9.3       Lab Results   Component Value Date    CREATININE 0.90 11/14/2023    CREATININE 0.83 11/13/2023    CREATININE 1.13 11/12/2023       No results found for: "NTBNP"        Results from last 7 days   Lab Units 11/13/23  0343 11/11/23  0833   MAGNESIUM mg/dL 1.6* 1.5*                     Results from last 7 days   Lab Units 11/10/23  1332   INR  0.90       Lipid Profile:   Lab Results   Component Value Date    CHOL 165 04/30/2015    CHOL 208 03/20/2014     Lab Results   Component Value Date    HDL 50 11/11/2023    HDL 48 (L) 06/09/2023    HDL 48 (L) 04/23/2022     Lab Results   Component Value Date LDLCALC 62 11/11/2023    LDLCALC 51 06/09/2023    LDLCALC 92 04/23/2022     Lab Results   Component Value Date    TRIG 84 11/11/2023    TRIG 166 (H) 06/09/2023    TRIG 97 04/23/2022       Meds/Allergies   all current active meds have been reviewed and current meds:   Current Facility-Administered Medications   Medication Dose Route Frequency    acetaminophen (TYLENOL) tablet 650 mg  650 mg Oral Q6H PRN    aluminum-magnesium hydroxide-simethicone (MAALOX) oral suspension 30 mL  30 mL Oral Q4H PRN    [START ON 11/15/2023] aspirin chewable tablet 81 mg  81 mg Oral Daily    atorvastatin (LIPITOR) tablet 40 mg  40 mg Oral Daily    baclofen tablet 10 mg  10 mg Oral BID PRN    bisacodyl (DULCOLAX) rectal suppository 10 mg  10 mg Rectal Daily PRN    diphenhydrAMINE (BENADRYL) tablet 50 mg  50 mg Oral 60 Min Pre-Op    escitalopram (LEXAPRO) tablet 10 mg  10 mg Oral Daily    ferrous sulfate tablet 325 mg  325 mg Oral Daily With Breakfast    gabapentin (NEURONTIN) capsule 300 mg  300 mg Oral HS    insulin lispro (HumaLOG) 100 units/mL subcutaneous injection 1-5 Units  1-5 Units Subcutaneous 4x Daily (AC & HS)    latanoprost (XALATAN) 0.005 % ophthalmic solution 1 drop  1 drop Both Eyes HS    levothyroxine tablet 37.5 mcg  37.5 mcg Oral Daily    meclizine (ANTIVERT) tablet 12.5 mg  12.5 mg Oral Q8H PRN    melatonin tablet 6 mg  6 mg Oral HS    metoprolol succinate (TOPROL-XL) 24 hr tablet 25 mg  25 mg Oral Q12H    Milnacipran HCl TABS 100 mg  1 tablet Oral Daily    oxybutynin (DITROPAN-XL) 24 hr tablet 10 mg  10 mg Oral Daily    oxyCODONE-acetaminophen (PERCOCET) 5-325 mg per tablet 2 tablet  2 tablet Oral Q12H PRN    pantoprazole (PROTONIX) EC tablet 40 mg  40 mg Oral Daily    polyethylene glycol (MIRALAX) packet 17 g  17 g Oral Daily    senna-docusate sodium (SENOKOT S) 8.6-50 mg per tablet 2 tablet  2 tablet Oral HS    sodium chloride 0.9 % infusion  100 mL/hr Intravenous Continuous     Medications Prior to Admission Medication    albuterol (PROVENTIL HFA,VENTOLIN HFA) 90 mcg/act inhaler    atorvastatin (LIPITOR) 40 mg tablet    gabapentin (NEURONTIN) 300 mg capsule    latanoprost (XALATAN) 0.005 % ophthalmic solution    levothyroxine 25 mcg tablet    lisinopril (ZESTRIL) 20 mg tablet    metFORMIN (GLUCOPHAGE) 1000 MG tablet    metoprolol succinate (TOPROL-XL) 25 mg 24 hr tablet    Milnacipran HCl (Savella) 100 MG TABS    oxyCODONE-acetaminophen (PERCOCET)  mg per tablet    pantoprazole (PROTONIX) 40 mg tablet    trospium (SANCTURA XR) 60 mg 24 hr capsule    aspirin 81 mg chewable tablet    baclofen 10 mg tablet    Blood Glucose Monitoring Suppl (OneTouch Verio Reflect) w/Device KIT    cholestyramine (QUESTRAN) 4 g packet    escitalopram (Lexapro) 20 mg tablet    glucose blood (OneTouch Verio) test strip    hyoscyamine (ANASPAZ,LEVSIN) 0.125 MG tablet    meclizine (ANTIVERT) 25 mg tablet    metoclopramide (REGLAN) 10 mg tablet    nystatin (MYCOSTATIN) powder    OneTouch Delica Lancets 59R MISC    saccharomyces boulardii (FLORASTOR) 250 mg capsule     sodium chloride, 100 mL/hr, Last Rate: 100 mL/hr (11/14/23 8973)    Counseling / Coordination of Care  Total floor / unit time spent today 20 minutes. Greater than 50% of total time was spent with the patient and / or family counseling and / or coordination of care. ** Please Note: Dragon 360 Dictation voice to text software may have been used in the creation of this document.  **

## 2023-11-14 NOTE — DISCHARGE INSTR - AVS FIRST PAGE
Dear Mckenzie Mcfarland,     It was our pleasure to care for you here at [facility]. It is our hope that we were always able to exceed the expected standards for your care during your stay. You were hospitalized due to Acute Respiratory failure. You were cared for on the third floor by Florida Lopez MD under the service of Willa Navarro MD with the [facility] who covers for your primary care physician (PCP), Dallin Mallory, while you were hospitalized. If you have any questions or concerns related to this hospitalization, you may contact us at [phone]. For follow up as well as any medication refills, we recommend that you follow up with your primary care physician. A registered nurse will reach out to you by phone within a few days after your discharge to answer any additional questions that you may have after going home.   However, at this time we provide for you here, the most important instructions / recommendations at discharge:     Notable Medication Adjustments -   Please take metoprolol 50 mg twice daily  Please take ranolazine 500 mg every 12 hours  Please take ferrous sulfate 325 mg daily  Stop lisinopril 20 mg daily  Please continue all other home meds as prior  Testing Required after Discharge -   None  ** Please contact your PCP to request testing orders for any of the testing recommended here **  Important follow up information -   Please follow-up with your PCP within a week of discharge  Please follow-up with cardiology as outpatient  Other Instructions -   Please take all your home medicine regularly as directed  If symptoms persist/returns please contact your PCP if unable then visit ER  Please review this entire after visit summary as additional general instructions including medication list, appointments, activity, diet, any pertinent wound care, and other additional recommendations from your care team that may be provided for you.       Sincerely,     Aaron Todd MD

## 2023-11-14 NOTE — PLAN OF CARE
Problem: Potential for Falls  Goal: Patient will remain free of falls  Description: INTERVENTIONS:  - Educate patient/family on patient safety including physical limitations  - Instruct patient to call for assistance with activity   - Consult OT/PT to assist with strengthening/mobility   - Keep Call bell within reach  - Keep bed low and locked with side rails adjusted as appropriate  - Keep care items and personal belongings within reach  - Initiate and maintain comfort rounds  - Make Fall Risk Sign visible to staff  - Apply yellow socks and bracelet for high fall risk patients  - Consider moving patient to room near nurses station  Outcome: Progressing     Problem: MOBILITY - ADULT  Goal: Maintain or return to baseline ADL function  Description: INTERVENTIONS:  -  Assess patient's ability to carry out ADLs; assess patient's baseline for ADL function and identify physical deficits which impact ability to perform ADLs (bathing, care of mouth/teeth, toileting, grooming, dressing, etc.)  - Assess/evaluate cause of self-care deficits   - Assess range of motion  - Assess patient's mobility; develop plan if impaired  - Assess patient's need for assistive devices and provide as appropriate  - Encourage maximum independence but intervene and supervise when necessary  - Involve family in performance of ADLs  - Assess for home care needs following discharge   - Consider OT consult to assist with ADL evaluation and planning for discharge  - Provide patient education as appropriate  Outcome: Progressing  Goal: Maintains/Returns to pre admission functional level  Description: INTERVENTIONS:  - Perform BMAT or MOVE assessment daily.   - Set and communicate daily mobility goal to care team and patient/family/caregiver.    - Collaborate with rehabilitation services on mobility goals if consulted  - Perform Range of Motion   - Out of bed for toileting  - Record patient progress and toleration of activity level   Outcome: Progressing     Problem: Prexisting or High Potential for Compromised Skin Integrity  Goal: Skin integrity is maintained or improved  Description: INTERVENTIONS:  - Identify patients at risk for skin breakdown  - Assess and monitor skin integrity  - Assess and monitor nutrition and hydration status  - Monitor labs   - Assess for incontinence   - Turn and reposition patient  - Assist with mobility/ambulation  - Relieve pressure over bony prominences  - Avoid friction and shearing  - Provide appropriate hygiene as needed including keeping skin clean and dry  - Evaluate need for skin moisturizer/barrier cream  - Collaborate with interdisciplinary team   - Patient/family teaching  - Consider wound care consult   Outcome: Progressing

## 2023-11-14 NOTE — PROGRESS NOTES
Cardiac cath performed via the RFA. Closed with angioseal.    60 -70 % focal OM 1 lesion - IFR negative for ischemia. Patent LAD and RCA. Medical management.

## 2023-11-15 VITALS
HEART RATE: 69 BPM | WEIGHT: 153 LBS | DIASTOLIC BLOOD PRESSURE: 65 MMHG | SYSTOLIC BLOOD PRESSURE: 126 MMHG | HEIGHT: 60 IN | OXYGEN SATURATION: 99 % | TEMPERATURE: 97.7 F | RESPIRATION RATE: 18 BRPM | BODY MASS INDEX: 30.04 KG/M2

## 2023-11-15 PROBLEM — J96.01 ACUTE RESPIRATORY FAILURE WITH HYPOXIA (HCC): Status: RESOLVED | Noted: 2023-11-10 | Resolved: 2023-11-15

## 2023-11-15 PROBLEM — I25.10 CAD (CORONARY ARTERY DISEASE): Status: ACTIVE | Noted: 2023-11-15

## 2023-11-15 LAB
ANION GAP SERPL CALCULATED.3IONS-SCNC: 4 MMOL/L
BUN SERPL-MCNC: 19 MG/DL (ref 5–25)
CALCIUM SERPL-MCNC: 9 MG/DL (ref 8.4–10.2)
CHLORIDE SERPL-SCNC: 106 MMOL/L (ref 96–108)
CO2 SERPL-SCNC: 26 MMOL/L (ref 21–32)
CREAT SERPL-MCNC: 1.04 MG/DL (ref 0.6–1.3)
ERYTHROCYTE [DISTWIDTH] IN BLOOD BY AUTOMATED COUNT: 14.7 % (ref 11.6–15.1)
GFR SERPL CREATININE-BSD FRML MDRD: 50 ML/MIN/1.73SQ M
GLUCOSE SERPL-MCNC: 111 MG/DL (ref 65–140)
GLUCOSE SERPL-MCNC: 128 MG/DL (ref 65–140)
GLUCOSE SERPL-MCNC: 142 MG/DL (ref 65–140)
GLUCOSE SERPL-MCNC: 181 MG/DL (ref 65–140)
HCT VFR BLD AUTO: 28.6 % (ref 34.8–46.1)
HGB BLD-MCNC: 8.6 G/DL (ref 11.5–15.4)
MCH RBC QN AUTO: 27.9 PG (ref 26.8–34.3)
MCHC RBC AUTO-ENTMCNC: 30.1 G/DL (ref 31.4–37.4)
MCV RBC AUTO: 93 FL (ref 82–98)
PLATELET # BLD AUTO: 260 THOUSANDS/UL (ref 149–390)
PMV BLD AUTO: 10.8 FL (ref 8.9–12.7)
POTASSIUM SERPL-SCNC: 4.9 MMOL/L (ref 3.5–5.3)
RBC # BLD AUTO: 3.08 MILLION/UL (ref 3.81–5.12)
SODIUM SERPL-SCNC: 136 MMOL/L (ref 135–147)
WBC # BLD AUTO: 13.31 THOUSAND/UL (ref 4.31–10.16)

## 2023-11-15 PROCEDURE — 80048 BASIC METABOLIC PNL TOTAL CA: CPT

## 2023-11-15 PROCEDURE — 99232 SBSQ HOSP IP/OBS MODERATE 35: CPT | Performed by: INTERNAL MEDICINE

## 2023-11-15 PROCEDURE — 82948 REAGENT STRIP/BLOOD GLUCOSE: CPT

## 2023-11-15 PROCEDURE — 99239 HOSP IP/OBS DSCHRG MGMT >30: CPT | Performed by: INTERNAL MEDICINE

## 2023-11-15 PROCEDURE — 85027 COMPLETE CBC AUTOMATED: CPT

## 2023-11-15 RX ORDER — FERROUS SULFATE 325(65) MG
325 TABLET ORAL
Qty: 30 TABLET | Refills: 0 | Status: SHIPPED | OUTPATIENT
Start: 2023-11-16

## 2023-11-15 RX ORDER — GABAPENTIN 300 MG/1
300 CAPSULE ORAL
Qty: 30 CAPSULE | Refills: 0 | Status: SHIPPED | OUTPATIENT
Start: 2023-11-15

## 2023-11-15 RX ORDER — RANOLAZINE 500 MG/1
500 TABLET, EXTENDED RELEASE ORAL EVERY 12 HOURS SCHEDULED
Qty: 60 TABLET | Refills: 0 | Status: SHIPPED | OUTPATIENT
Start: 2023-11-15

## 2023-11-15 RX ORDER — METOPROLOL SUCCINATE 50 MG/1
50 TABLET, EXTENDED RELEASE ORAL EVERY 12 HOURS SCHEDULED
Qty: 60 TABLET | Refills: 0 | Status: SHIPPED | OUTPATIENT
Start: 2023-11-15

## 2023-11-15 RX ADMIN — ATORVASTATIN CALCIUM 40 MG: 40 TABLET, FILM COATED ORAL at 09:47

## 2023-11-15 RX ADMIN — METOPROLOL SUCCINATE 50 MG: 50 TABLET, EXTENDED RELEASE ORAL at 09:48

## 2023-11-15 RX ADMIN — INSULIN LISPRO 1 UNITS: 100 INJECTION, SOLUTION INTRAVENOUS; SUBCUTANEOUS at 11:54

## 2023-11-15 RX ADMIN — FERROUS SULFATE TAB 325 MG (65 MG ELEMENTAL FE) 325 MG: 325 (65 FE) TAB at 09:48

## 2023-11-15 RX ADMIN — OXYBUTYNIN CHLORIDE 10 MG: 5 TABLET, EXTENDED RELEASE ORAL at 09:47

## 2023-11-15 RX ADMIN — PANTOPRAZOLE SODIUM 40 MG: 40 TABLET, DELAYED RELEASE ORAL at 09:48

## 2023-11-15 RX ADMIN — ASPIRIN 81 MG CHEWABLE TABLET 81 MG: 81 TABLET CHEWABLE at 09:47

## 2023-11-15 RX ADMIN — IRON SUCROSE 200 MG: 20 INJECTION, SOLUTION INTRAVENOUS at 11:38

## 2023-11-15 RX ADMIN — ESCITALOPRAM OXALATE 10 MG: 10 TABLET ORAL at 09:48

## 2023-11-15 RX ADMIN — RANOLAZINE 500 MG: 500 TABLET, FILM COATED, EXTENDED RELEASE ORAL at 09:48

## 2023-11-15 RX ADMIN — LEVOTHYROXINE SODIUM 37.5 MCG: 75 TABLET ORAL at 09:48

## 2023-11-15 NOTE — PROGRESS NOTES
[address] [place] [address]  Progress Note  Name: Alida Perez  MRN: [id number]  Unit/Bed#: S -03 I Date of Admission: 11/10/2023   Date of Service: 11/15/2023 I Hospital Day: 5    Assessment/Plan   * Non obstructive CAD  Assessment & Plan  Cardiac cath: Diffuse calcified CAD. No focal significant lesions. 1st Mrg lesion is 70% stenosed. First Obtuse Marginal Branch 1st Mrg lesion is 70% stenosed. The vessel size is medium. LETY flow is 3. The lesion is non high-C and focal. Proximal vessel. iFR was measured in the 1st Mrg. iFR ratio: 0.95. The iFR was nonsignificant, not intervened. Plan:  Inc Metoprolol to 50 BID  Started Renexa 500 q12h  Cardiology on board-appreciate recommendation  Monitor vitals/Telemetry    Acute respiratory failure with hypoxia (HCC)-resolved as of 11/15/2023  Assessment & Plan  Patient presents from PCP office with chest pressure, SOB, lightheadedness and hypoxia with exertion, and tachycardia  At rest in the ED O2Sat mid 90s on RA. Desat to high 80s with ambulation and tachycardia  EKG sinus tachycardia with T wave inversion in lead III, prolonged Qtc  Last echo 2/23 showing 65% EF and normal systolic function. No aortic stenosis  CXR normal per my read. History of tobacco use, quit 40 years ago  History of falls in the past 3 months with unknown etiology. Seen by ENT in 9/23 - likely medication induced vs vascular issue. Vestibular therapy recommended  Elevated D-dimer 0.86. Troponin negative, BNP 25  SADAF with Cr 1.48 on admission  (baseline 0.8-1)  Creatinine improved 1.26   Patient gets chest pressure and desaturates when ambulating   No evidence of acute or chronic deep vein thrombosis VAS lower limb   Echo: LVEF 70%,LVOT obstruction noted with Valsalva, but not at rest.   V/Q scan shows low probability of PE  Cardiac cath: Diffuse calcified CAD. No focal significant lesions. 1st Mrg lesion is 70% stenosed.  First Obtuse Marginal Branch 1st Mrg lesion is 70% stenosed. The vessel size is medium. LETY flow is 3. The lesion is non high-C and focal. Proximal vessel. iFR was measured in the 1st Mrg. iFR ratio: 0.95. The iFR was nonsignificant, not intervened. Plan  Discontinue heparin gtt low probability of PE on VQ scan  cardiology consulted  Ambulatory pulse ox  Monitor on telemetry      Acute kidney injury ([address])  Assessment & Plan  POA Cr 1.48 with baseline 0.8-1. Possible due to decreased PO intake per daughter report  SADAF resolved creatinine back to baseline    Lab Results   Component Value Date    CREATININE 0.90 11/14/2023    CREATININE 0.83 11/13/2023    CREATININE 1.13 11/12/2023    CREATININE 1.26 11/11/2023      Plan  Encourage PO intake  Hold PTA lisinopril for now can resume on discharge  Repeat BMP. Replete electrolytes prn    Hypertension  Assessment & Plan  Blood Pressure: 121/63   Increase metoprolol to 50 twice daily  Hold lisinopril in setting of SADAF    History of lumbar surgery  Assessment & Plan  Prior thoracic and lumbar surgery, prior laminectomy, spinal cord stimulator in place (non-functional per daughter)    Plan  Continue PTA Percocet 10/325 mg Q12 prn  Continue PTA Savella  Continue PTA gabapentin 300 mg hs and baclofen prn    Ambulatory dysfunction  Assessment & Plan  Ambulates with walker at home. Frequent falls recently with admission on 8/26  PT/OT: recs Level 3    Plan  Orthostatic vital signs  Continue meclizine prn for dizziness    Anemia  Assessment & Plan  Hb 10.4, within baseline on 10-11, MCV normal. Likely anemia of chronic disease vs mixed anemia (micro+macro with averaging to normal MCV), no recent iron panel, B12, folate.  Prior iron and ferritin low 3 years ago  B12 and Folate  wnl, iron-31, TIBC 331, iron saturation 9 , most likely iron deficiency anemia  Lab Results   Component Value Date    HGB 8.6 (L) 11/15/2023    HGB 9.5 (L) 11/14/2023    HGB 9.4 (L) 11/13/2023    HGB 8.0 (L) 11/13/2023        Plan  Started Ferrous sulfate 325 mg daily  Bowel regime to avoid constipation     Overactive bladder  Assessment & Plan  Start oxybutynin, replacement for home trospium per formulary    Hypothyroidism  Assessment & Plan  Continue PTA levothyroxine    Glaucoma  Assessment & Plan  Continue PTA latanoprost    Type 2 diabetes mellitus [facility])  Assessment & Plan  Lab Results   Component Value Date    HGBA1C 6.6 (H) 2023       Recent Labs     23  1201 23  1528 23  2225 11/15/23  0750   POCGLU 173* 210* 215* 142*       Blood Sugar Average: Last 72 hrs:  (P) [id number]  Well-controlled. Hold PTA metformin, initiate sliding scale  Accu-Chek             VTE Pharmacologic Prophylaxis: VTE Score: 6 High Risk (Score >/= 5) - Pharmacological DVT Prophylaxis Ordered: heparin drip. Sequential Compression Devices Ordered. Patient Centered Rounds: I performed bedside rounds with nursing staff today. Discussions with Specialists or Other Care Team Provider: Cardiology    Education and Discussions with Family / Patient: Attempted to update  (daughter) via phone. Left voicemail. Pt's daughter is a doctor ( Family med). Current Length of Stay: 5 day(s)  Current Patient Status: Inpatient   Discharge Plan: Anticipate discharge in 24-48 hrs to home. Code Status: Level 1 - Full Code    Subjective:   I have examined the patient bedside this morning. Overnight: No ON events . She is AAOx3, denies any CP, SOB, abdominal pain, N/V/D/C, urinary symptoms. Last BM yesterday and UO is normal. Pt is hemodynamical stable. She is feeling fine after cath and no new complaints. Objective:     Vitals:   Temp (24hrs), Av.8 °F (36.6 °C), Min:97.2 °F (36.2 °C), Max:98.3 °F (36.8 °C)    Temp:  [97.2 °F (36.2 °C)-98.3 °F (36.8 °C)] 97.7 °F (36.5 °C)  HR:  [69-80] 69  Resp:  [12-18] 18  BP: (101-142)/(45-73) 101/45  SpO2:  [97 %-100 %] 99 %  Body mass index is 29.88 kg/m².      Input and Output Summary (last 24 hours): Intake/Output Summary (Last 24 hours) at 11/15/2023 0806  Last data filed at 11/14/2023 2100  Gross per 24 hour   Intake 480 ml   Output --   Net 480 ml       Physical Exam:   Physical Exam  Vitals and nursing note reviewed. Constitutional:       General: She is not in acute distress. Appearance: She is well-developed. She is not toxic-appearing. Cardiovascular:      Rate and Rhythm: Normal rate and regular rhythm. Pulmonary:      Effort: Pulmonary effort is normal. No respiratory distress. Breath sounds: No wheezing or rales. Abdominal:      General: Bowel sounds are normal. There is no distension. Palpations: Abdomen is soft. Tenderness: There is no abdominal tenderness. Musculoskeletal:      Right lower leg: No edema. Left lower leg: No edema. Neurological:      Mental Status: She is alert and oriented to person, place, and time. Psychiatric:         Mood and Affect: Mood normal.          Additional Data:     Labs:  Results from last 7 days   Lab Units 11/15/23  0525 11/13/23  0343 11/12/23  0917   WBC Thousand/uL 13.31*   < > 7.04   HEMOGLOBIN g/dL 8.6*   < > 9.7*   HEMATOCRIT % 28.6*   < > 32.6*   PLATELETS Thousands/uL 260   < > 304   NEUTROS PCT %  --   --  57   LYMPHS PCT %  --   --  26   MONOS PCT %  --   --  8   EOS PCT %  --   --  7*    < > = values in this interval not displayed. Results from last 7 days   Lab Units 11/15/23  0525 11/11/23  0833 11/10/23  1332   SODIUM mmol/L 136   < > 136   POTASSIUM mmol/L 4.9   < > 4.6   CHLORIDE mmol/L 106   < > 101   CO2 mmol/L 26   < > 25   BUN mg/dL 19   < > 33*   CREATININE mg/dL 1.04   < > 1.48*   ANION GAP mmol/L 4   < > 10   CALCIUM mg/dL 9.0   < > 9.3   ALBUMIN g/dL  --   --  3.7   TOTAL BILIRUBIN mg/dL  --   --  0.28   ALK PHOS U/L  --   --  84   ALT U/L  --   --  8   AST U/L  --   --  14   GLUCOSE RANDOM mg/dL 128   < > 169*    < > = values in this interval not displayed.      Results from last 7 days Lab Units 11/10/23  1332   INR  0.90     Results from last 7 days   Lab Units 11/15/23  0750 11/14/23  2225 11/14/23  1528 11/14/23  1201 11/14/23  0718 11/13/23  2114 11/13/23  1641 11/13/23  1139 11/13/23  0814 11/12/23  2109 11/12/23  1636 11/12/23  1120   POC GLUCOSE mg/dl 142* 215* 210* 173* 216* 170* 127 122 108 147* 136 142*               Lines/Drains:  Invasive Devices       Peripheral Intravenous Line  Duration             Long-Dwell Peripheral IV (Midline) 11/13/23 Right Brachial 1 day                      Telemetry:  Telemetry Orders (From admission, onward)               24 Hour Telemetry Monitoring  Continuous x 24 Hours (Telem)        Comments: Currently PE rule out   Question:  Reason for 24 Hour Telemetry  Answer:  Pulmonary Embolism                     Telemetry Reviewed: Normal Sinus Rhythm  Indication for Continued Telemetry Use: PE      Imaging: Reviewed radiology reports from this admission including: Cardiac cath  XR chest portable   Final Result by Alondra Mott MD (11/13 1636)      No acute cardiopulmonary disease. Workstation performed: [id number]         NM lung ventilation / perfusion   Final Result by Angie Espino MD (11/13 1416)      The probability for pulmonary embolus is very low. Workstation performed: [id number]         VAS lower limb venous duplex study, complete bilateral   Final Result by Saravanan Hale MD (11/11 1226)      XR chest 2 views   ED Interpretation by Hayde Rao MD (11/10 1435)   CXR with no acute cardiopulmonary abnormalities. Final Result by Prema Hansen MD (11/10 1630)      No acute cardiopulmonary disease.                Workstation performed: [id number]             Recent Cultures (last 7 days):         Last 24 Hours Medication List:   Current Facility-Administered Medications   Medication Dose Route Frequency Provider Last Rate    acetaminophen  650 mg Oral Q6H PRN NANY Ramirez aluminum-magnesium hydroxide-simethicone  30 mL Oral Q4H PRN Arby Badder, CRNP      aspirin  81 mg Oral Daily Arby Badder, CRNP      atorvastatin  40 mg Oral Daily Arby Badder, CRNP      baclofen  10 mg Oral BID PRN Arby Badder, CRNP      bisacodyl  10 mg Rectal Daily PRN Arby Badder, CRNP      escitalopram  10 mg Oral Daily Nelly Grecsek, CRNP      ferrous sulfate  325 mg Oral Daily With Breakfast Nelly Grecsek, CRNP      gabapentin  300 mg Oral HS Nelly Grecsek, CRNP      insulin lispro  1-5 Units Subcutaneous 4x Daily (AC & HS) Nelly Grecsek, CRNP      latanoprost  1 drop Both Eyes HS Nelly Grecsek, CRNP      levothyroxine  37.5 mcg Oral Daily Nelly Grecsek, CRNP      meclizine  12.5 mg Oral Q8H PRN Arby Badder, CRNP      melatonin  6 mg Oral HS Nelly Grecsek, CRNP      metoprolol succinate  50 mg Oral Q12H [address] [facility], CRNP      Milnacipran HCl  1 tablet Oral Daily Nelly Grecsek, CRNP      oxybutynin  10 mg Oral Daily Arby Badder, CRNP      oxyCODONE-acetaminophen  2 tablet Oral Q12H PRN Arby Badder, CRNP      pantoprazole  40 mg Oral Daily Nelly Grecsek, CRNP      polyethylene glycol  17 g Oral Daily Nelly Grecsek, CRNP      ranolazine  500 mg Oral Q12H [address] [facility], CRNP      senna-docusate sodium  2 tablet Oral HS Arby Badder, CRNP             Today, Patient Was Seen By: Rashel Krueger MD    **Please Note: This note may have been constructed using a voice recognition system. **

## 2023-11-15 NOTE — PROGRESS NOTES
General Cardiology   Progress Note -  Team One   Romy Bosch 78 y.o. female MRN: [id number]  Unit/Bed#: S -01 Encounter: [id number]    Assessment     Exertional shortness of breath  Chest pressure and exertional SOB for some time prior to admission  Symptoms resolve with rest  BNP 25. No signs of heart failure, likely hypovolemic on admission with SADAF and under filled LV on echo. D Dimer 0.86- V/Q scan 11/13- very low probability of PE  TTE 11/12: LV hyperdynamic, EF 38%, grade 1 diastolic dysfunction, dynamic LVOT obstruction with Valsalva with a peak gradient of 117 mmHg. RV size/function normal and no significant valve disease. Metoprolol succinate increased to 50 mg BID this admission  LHC showed moderate nonobstructive disease of LCx with negative IFR. Ranexa added 11/14     SADAF, resolved- improved with IVF, creatinine 1.48 on admission now 1.0     HTN-controlled, avg /63. Home lisinopril 20 mg daily is held given SADAF on admission. Metoprolol succinate increased to 50 mg BID on 11/14. HLD- , triglycerides 84, HDL 50, LDL 62 on atorvastatin 40 mg daily     Type 2 DM-A1c 6.6% in June 2023     Hypothyroidism-TSH WNL in June     Anemia-hemoglobin 8.6     Plan/discussion  Continue increased dose of Toprol XL 50 mg BID  Continue Ranexa 500 mg BID  Continue ASA and statin  D/c lisinopril indefinitely   Ambulate hallways to assess symptoms. If symptoms improved/resolved then stable for d/c home today    Subjective  Review of Systems   Constitutional: Negative for chills, malaise/fatigue and weight gain. Cardiovascular:  Negative for chest pain, dyspnea on exertion, leg swelling, orthopnea, palpitations and syncope. Respiratory:  Negative for cough, shortness of breath, sleep disturbances due to breathing and sputum production. Gastrointestinal:  Negative for bloating and nausea. Neurological:  Negative for dizziness, light-headedness and weakness.    Psychiatric/Behavioral: Negative for altered mental status. All other systems reviewed and are negative. Objective:   Vitals: Blood pressure (!) 101/45, pulse 69, temperature 97.7 °F (36.5 °C), resp. rate 18, height 5' (1.524 m), weight 69.4 kg (153 lb), SpO2 99 %, not currently breastfeeding., Body mass index is 29.88 kg/m². ,     Systolic (30HYH), XMS:729 , Min:101 , VHZ:694     Diastolic (43TJR), YE, Min:45, Max:73      Intake/Output Summary (Last 24 hours) at 11/15/2023 0942  Last data filed at 11/15/2023 0932  Gross per 24 hour   Intake 840 ml   Output --   Net 840 ml     Wt Readings from Last 3 Encounters:   23 69.4 kg (153 lb)   23 69.4 kg (153 lb)   23 69.4 kg (153 lb)     Telemetry Review: NSR    Physical Exam  Vitals reviewed. Constitutional:       General: She is not in acute distress. Neck:      Vascular: No hepatojugular reflux or JVD. Cardiovascular:      Rate and Rhythm: Normal rate and regular rhythm. Heart sounds: Murmur heard. Systolic murmur is present with a grade of 1/6. No friction rub. No gallop. Pulmonary:      Effort: Pulmonary effort is normal. No respiratory distress. Breath sounds: No rales. Abdominal:      General: Bowel sounds are normal. There is no distension. Palpations: Abdomen is soft. Tenderness: There is no abdominal tenderness. Musculoskeletal:         General: No tenderness. Normal range of motion. Cervical back: Neck supple. Right lower leg: No edema. Left lower leg: No edema. Skin:     General: Skin is warm and dry. Findings: No erythema. Comments: Right femoral cath site- dressing C/D/I. No bleeding, erythema, or ecchymosis. Groin soft. Neurological:      Mental Status: She is alert and oriented to person, place, and time.    Psychiatric:         Mood and Affect: Mood normal.     LABORATORY RESULTS      CBC with diff:   Results from last 7 days   Lab Units 11/15/23  0525 23  0701 23  1544 11/13/23  0343 11/12/23  0917 11/11/23  0833 11/10/23  2240 11/10/23  1332   WBC Thousand/uL 13.31* 5.41  --  5.93 7.04 8.36  --  9.55   HEMOGLOBIN g/dL 8.6* 9.5* 9.4* 8.0* 9.7* 10.3*  --  10.4*   HEMATOCRIT % 28.6* 31.0*  --  26.4* 32.6* 33.8*  --  33.6*   MCV fL 93 93  --  95 96 91  --  92   PLATELETS Thousands/uL 260 220  --  178 304 265 281 263   RBC Million/uL 3.08* 3.35*  --  2.78* 3.41* 3.71*  --  3.65*   MCH pg 27.9 28.4  --  28.8 28.4 27.8  --  28.5   MCHC g/dL 30.1* 30.6*  --  30.3* 29.8* 30.5*  --  31.0*   RDW % 14.7 14.7  --  14.7 15.1 14.9  --  14.6   MPV fL 10.8 10.8  --  10.8 11.2 11.1 10.8 11.0   NRBC AUTO /100 WBCs  --   --   --   --  1 0  --  0       CMP:  Results from last 7 days   Lab Units 11/15/23  0525 11/14/23  0701 11/13/23  0343 11/12/23  0917 11/11/23  0833 11/10/23  1332   POTASSIUM mmol/L 4.9 5.0 4.0 4.6 4.4 4.6   CHLORIDE mmol/L 106 105 113* 106 104 101   CO2 mmol/L 26 25 21 25 26 25   BUN mg/dL 19 17 16 23 27* 33*   CREATININE mg/dL 1.04 0.90 0.83 1.13 1.26 1.48*   CALCIUM mg/dL 9.0 8.9 7.1* 9.0 9.1 9.3   AST U/L  --   --   --   --   --  14   ALT U/L  --   --   --   --   --  8   ALK PHOS U/L  --   --   --   --   --  84   EGFR ml/min/1.73sq m 50 60 66 46 40 33       BMP:  Results from last 7 days   Lab Units 11/15/23  0525 11/14/23  0701 11/13/23  0343 11/12/23  0917 11/11/23  0833 11/10/23  1332   POTASSIUM mmol/L 4.9 5.0 4.0 4.6 4.4 4.6   CHLORIDE mmol/L 106 105 113* 106 104 101   CO2 mmol/L 26 25 21 25 26 25   BUN mg/dL 19 17 16 23 27* 33*   CREATININE mg/dL 1.04 0.90 0.83 1.13 1.26 1.48*   CALCIUM mg/dL 9.0 8.9 7.1* 9.0 9.1 9.3       Lab Results   Component Value Date    CREATININE 1.04 11/15/2023    CREATININE 0.90 11/14/2023    CREATININE 0.83 11/13/2023      Results from last 7 days   Lab Units 11/13/23  0343 11/11/23  0833   MAGNESIUM mg/dL 1.6* 1.5*     Results from last 7 days   Lab Units 11/10/23  1332   INR  0.90       Lipid Profile:   Lab Results   Component Value Date CHOL 165 04/30/2015    CHOL 208 03/20/2014     Lab Results   Component Value Date    HDL 50 11/11/2023    HDL 48 (L) 06/09/2023    HDL 48 (L) 04/23/2022     Lab Results   Component Value Date    LDLCALC 62 11/11/2023    LDLCALC 51 06/09/2023    LDLCALC 92 04/23/2022     Lab Results   Component Value Date    TRIG 84 11/11/2023    TRIG 166 (H) 06/09/2023    TRIG 97 04/23/2022       Meds/Allergies   all current active meds have been reviewed and current meds:   Current Facility-Administered Medications   Medication Dose Route Frequency    acetaminophen (TYLENOL) tablet 650 mg  650 mg Oral Q6H PRN    aluminum-magnesium hydroxide-simethicone (MAALOX) oral suspension 30 mL  30 mL Oral Q4H PRN    aspirin chewable tablet 81 mg  81 mg Oral Daily    atorvastatin (LIPITOR) tablet 40 mg  40 mg Oral Daily    baclofen tablet 10 mg  10 mg Oral BID PRN    bisacodyl (DULCOLAX) rectal suppository 10 mg  10 mg Rectal Daily PRN    escitalopram (LEXAPRO) tablet 10 mg  10 mg Oral Daily    ferrous sulfate tablet 325 mg  325 mg Oral Daily With Breakfast    gabapentin (NEURONTIN) capsule 300 mg  300 mg Oral HS    insulin lispro (HumaLOG) 100 units/mL subcutaneous injection 1-5 Units  1-5 Units Subcutaneous 4x Daily (AC & HS)    iron sucrose (VENOFER) 200 mg in sodium chloride 0.9 % 100 mL IVPB  200 mg Intravenous Once    latanoprost (XALATAN) 0.005 % ophthalmic solution 1 drop  1 drop Both Eyes HS    levothyroxine tablet 37.5 mcg  37.5 mcg Oral Daily    meclizine (ANTIVERT) tablet 12.5 mg  12.5 mg Oral Q8H PRN    melatonin tablet 6 mg  6 mg Oral HS    metoprolol succinate (TOPROL-XL) 24 hr tablet 50 mg  50 mg Oral Q12H ARY    Milnacipran HCl TABS 100 mg  1 tablet Oral Daily    oxybutynin (DITROPAN-XL) 24 hr tablet 10 mg  10 mg Oral Daily    oxyCODONE-acetaminophen (PERCOCET) 5-325 mg per tablet 2 tablet  2 tablet Oral Q12H PRN    pantoprazole (PROTONIX) EC tablet 40 mg  40 mg Oral Daily    polyethylene glycol (MIRALAX) packet 17 g  17 g Oral Daily    ranolazine (RANEXA) 12 hr tablet 500 mg  500 mg Oral Q12H [facility]    senna-docusate sodium (SENOKOT S) 8.6-50 mg per tablet 2 tablet  2 tablet Oral HS     Medications Prior to Admission   Medication    albuterol (PROVENTIL HFA,VENTOLIN HFA) 90 mcg/act inhaler    atorvastatin (LIPITOR) 40 mg tablet    gabapentin (NEURONTIN) 300 mg capsule    latanoprost (XALATAN) 0.005 % ophthalmic solution    levothyroxine 25 mcg tablet    lisinopril (ZESTRIL) 20 mg tablet    metFORMIN (GLUCOPHAGE) 1000 MG tablet    metoprolol succinate (TOPROL-XL) 25 mg 24 hr tablet    Milnacipran HCl (Savella) 100 MG TABS    oxyCODONE-acetaminophen (PERCOCET)  mg per tablet    pantoprazole (PROTONIX) 40 mg tablet    trospium (SANCTURA XR) 60 mg 24 hr capsule    aspirin 81 mg chewable tablet    baclofen 10 mg tablet    Blood Glucose Monitoring Suppl (OneTouch Verio Reflect) w/Device KIT    cholestyramine (QUESTRAN) 4 g packet    escitalopram (Lexapro) 20 mg tablet    glucose blood (OneTouch Verio) test strip    hyoscyamine (ANASPAZ,LEVSIN) 0.125 MG tablet    meclizine (ANTIVERT) 25 mg tablet    metoclopramide (REGLAN) 10 mg tablet    nystatin (MYCOSTATIN) powder    OneTouch Delica Lancets 50S MISC    saccharomyces boulardii (FLORASTOR) 250 mg capsule     Counseling / Coordination of Care  Total floor / unit time spent today 20 minutes. Greater than 50% of total time was spent with the patient and / or family counseling and / or coordination of care. ** Please Note: Dragon 360 Dictation voice to text software may have been used in the creation of this document.  **

## 2023-11-15 NOTE — CASE MANAGEMENT
Case Management Discharge Planning Note    Patient name Author Grecia PETERSON /S -00 MRN [id number]  : 1945 Date 11/15/2023       Current Admission Date: 11/10/2023  Current Admission Diagnosis:Non obstructive CAD   Patient Active Problem List    Diagnosis Date Noted    Non obstructive CAD 11/15/2023    History of lumbar surgery 11/10/2023    Abnormal urinalysis 2023    Chronic obstructive pulmonary disease, unspecified COPD type ([address]) 2023    Confusion with body shakes and blank stare 2023    Normocytic anemia 2023    Bilateral lower extremity edema 2023    Continuous opioid dependence ([address]) 2022    Cerebrovascular accident (CVA), unspecified mechanism ([address]) 2022    Ambulatory dysfunction 2022    Anemia 2022    Acute kidney injury ([address]) 2022    Obesity, morbid ([address]) 2021    Prepyloric ulcer 2021    Diarrhea 2021    Mild intermittent asthma without complication     Iron deficiency anemia secondary to inadequate dietary iron intake 2018    Type 2 diabetes mellitus ([address])     Failed back surgical syndrome 2018    Hypothyroidism 2017    Degenerative lumbar spinal stenosis 2017    Glaucoma 2017    Overactive bladder 2016    Dyspepsia 2016    Hypercholesterolemia 2016    Anxiety 2015    Chronic pain disorder 2013    Hypertension 2013      LOS (days): 5  Geometric Mean LOS (GMLOS) (days): 5.40  Days to GMLOS:0.5     OBJECTIVE:  Risk of Unplanned Readmission Score: 25.58         Current admission status: Inpatient   Preferred Pharmacy:   [address] [place] [address] [place]  [address] [place] [address]  Phone: [phone] Fax: [phone] [address] [facility] [id number] [place] [address] [place] [address]  Phone: [phone] Fax: [phone]    [address], [address] [facility]  [address] [facility]  Phone: [phone] Fax: [phone]    [facility] [address] [facility] [address], [address]  [address] [place] [address]  Phone: [phone] Fax: [address] [facility], [address] [facility] [address]  [address]  [address] [facility] [address]  Phone: [phone] Fax: [phone]    Primary Care Provider: NANY Etienne    Primary Insurance: [employer] [address] [facility]  Secondary Insurance:     DISCHARGE DETAILS:    Discharge planning discussed with[de-identified] patient and pt's dtr Khadijah Callahan of Choice: Yes  Comments - Freedom of Choice: CM f/u with pt at bedside re: dc for today per SLIM, pt relayed her daughter will provide transport home when ready. CM f/u with pt's dtr Derick Acevedo via phone re: same - dtr confirmed pt's other dtr Arianna Sandoval will provide transport home today after work - after 4pm. CM contacted [employer] (ph: [phone]) left  for pt's [facility] notifying of pt d/c to home today. CM contacted family/caregiver?: Yes             Contacts  Patient Contacts: Derick Acevedo, daughter  Relationship to Patient[de-identified] Family  Contact Method: Phone  Phone Number: [phone]  Reason/Outcome: Discharge Planning                                                         IMM reviewed with patient, patient agrees with discharge determination.   IMM Given (Date):: 11/15/23  IMM Given to[de-identified] Patient

## 2023-11-15 NOTE — ASSESSMENT & PLAN NOTE
Cardiac cath: Diffuse calcified CAD. No focal significant lesions. 1st Mrg lesion is 70% stenosed. First Obtuse Marginal Branch 1st Mrg lesion is 70% stenosed. The vessel size is medium. LETY flow is 3. The lesion is non high-C and focal. Proximal vessel. iFR was measured in the 1st Mrg. iFR ratio: 0.95. The iFR was nonsignificant, not intervened.     Plan:  Inc Metoprolol to 50 BID  Started Renexa 500 q12h  Cardiology on board-appreciate recommendation  Monitor vitals/Telemetry

## 2023-11-15 NOTE — PLAN OF CARE
Problem: Potential for Falls  Goal: Patient will remain free of falls  Description: INTERVENTIONS:  - Educate patient/family on patient safety including physical limitations  - Instruct patient to call for assistance with activity   - Consult OT/PT to assist with strengthening/mobility   - Keep Call bell within reach  - Keep bed low and locked with side rails adjusted as appropriate  - Keep care items and personal belongings within reach  - Initiate and maintain comfort rounds  - Make Fall Risk Sign visible to staff  - Offer Toileting every 2 Hours, in advance of need  - Initiate/Maintain bed/chair alarm  - Obtain necessary fall risk management equipment  - Apply yellow socks and bracelet for high fall risk patients  - Consider moving patient to room near nurses station  Outcome: Progressing     Problem: MOBILITY - ADULT  Goal: Maintain or return to baseline ADL function  Description: INTERVENTIONS:  -  Assess patient's ability to carry out ADLs; assess patient's baseline for ADL function and identify physical deficits which impact ability to perform ADLs (bathing, care of mouth/teeth, toileting, grooming, dressing, etc.)  - Assess/evaluate cause of self-care deficits   - Assess range of motion  - Assess patient's mobility; develop plan if impaired  - Assess patient's need for assistive devices and provide as appropriate  - Encourage maximum independence but intervene and supervise when necessary  - Involve family in performance of ADLs  - Assess for home care needs following discharge   - Consider OT consult to assist with ADL evaluation and planning for discharge  - Provide patient education as appropriate  Outcome: Progressing  Goal: Maintains/Returns to pre admission functional level  Description: INTERVENTIONS:  - Perform BMAT or MOVE assessment daily.   - Set and communicate daily mobility goal to care team and patient/family/caregiver.    - Collaborate with rehabilitation services on mobility goals if consulted  - Perform Range of Motion   - Reposition patient every   - Dangle patient   - Stand patient   - Ambulate patient   - Out of bed to chair    - Out of bed for meals  - Out of bed for toileting  - Record patient progress and toleration of activity level   Outcome: Progressing     Problem: Prexisting or High Potential for Compromised Skin Integrity  Goal: Skin integrity is maintained or improved  Description: INTERVENTIONS:  - Identify patients at risk for skin breakdown  - Assess and monitor skin integrity  - Assess and monitor nutrition and hydration status  - Monitor labs   - Assess for incontinence   - Turn and reposition patient  - Assist with mobility/ambulation  - Relieve pressure over bony prominences  - Avoid friction and shearing  - Provide appropriate hygiene as needed including keeping skin clean and dry  - Evaluate need for skin moisturizer/barrier cream  - Collaborate with interdisciplinary team   - Patient/family teaching  - Consider wound care consult   Outcome: Progressing

## 2023-11-16 ENCOUNTER — TRANSITIONAL CARE MANAGEMENT (OUTPATIENT)
Dept: FAMILY MEDICINE CLINIC | Facility: CLINIC | Age: 78
End: 2023-11-16

## 2023-11-17 LAB
ATRIAL RATE: 72 BPM
P AXIS: 33 DEGREES
PR INTERVAL: 140 MS
QRS AXIS: -31 DEGREES
QRSD INTERVAL: 136 MS
QT INTERVAL: 470 MS
QTC INTERVAL: 514 MS
T WAVE AXIS: 82 DEGREES
VENTRICULAR RATE: 72 BPM

## 2023-11-17 PROCEDURE — 93010 ELECTROCARDIOGRAM REPORT: CPT | Performed by: INTERNAL MEDICINE

## 2023-11-17 NOTE — UTILIZATION REVIEW
NOTIFICATION OF ADMISSION DISCHARGE   This is a Notification of Discharge from [facility]. Please be advised that this patient has been discharge from our facility. Below you will find the admission and discharge date and time including the patient’s disposition. UTILIZATION REVIEW CONTACT:  Glenda Corral  Utilization   Network Utilization Review Department  Phone: [phone] x carefully listen to the prompts. All voicemails are confidential.  Email: [email or web address]     ADMISSION INFORMATION  PRESENTATION DATE: 11/10/2023 12:22 PM  OBERVATION ADMISSION DATE:   INPATIENT ADMISSION DATE: 11/10/23  4:02 PM   DISCHARGE DATE: 11/15/2023  5:53 PM   DISPOSITION:Home/Self Care    Network Utilization Review Department  ATTENTION: Please call with any questions or concerns to [phone] and carefully listen to the prompts so that you are directed to the right person. All voicemails are confidential.   For Discharge needs, contact Care Management DC Support Team at [phone] opt. 2  Send all requests for admission clinical reviews, approved or denied determinations and any other requests to dedicated fax number below belonging to the campus where the patient is receiving treatment.  List of dedicated fax numbers for the Facilities:  [place] DENIALS (Administrative/Medical Necessity) [phone]   DISCHARGE SUPPORT TEAM (Network) [phone] [facility] (Maternity/NICU/Pediatrics) [phone]   [address] [facility] [address] [phone] [facility] [phone]   [address] [place] [address]  [facility] [phone]

## 2023-11-18 ENCOUNTER — HOSPITAL ENCOUNTER (EMERGENCY)
Facility: HOSPITAL | Age: 78
Discharge: HOME | End: 2023-11-18
Attending: INTERNAL MEDICINE
Payer: MEDICARE

## 2023-11-18 ENCOUNTER — APPOINTMENT (EMERGENCY)
Dept: RADIOLOGY | Facility: HOSPITAL | Age: 78
End: 2023-11-18
Payer: MEDICARE

## 2023-11-18 VITALS
HEART RATE: 66 BPM | SYSTOLIC BLOOD PRESSURE: 139 MMHG | DIASTOLIC BLOOD PRESSURE: 61 MMHG | TEMPERATURE: 98.3 F | RESPIRATION RATE: 18 BRPM | OXYGEN SATURATION: 92 %

## 2023-11-18 DIAGNOSIS — Z51.89 ENCOUNTER FOR REHABILITATION: ICD-10-CM

## 2023-11-18 DIAGNOSIS — R53.1 GENERALIZED WEAKNESS: Primary | ICD-10-CM

## 2023-11-18 LAB
ALBUMIN SERPL BCP-MCNC: 3.7 G/DL (ref 3.5–5)
ALP SERPL-CCNC: 73 U/L (ref 34–104)
ALT SERPL W P-5'-P-CCNC: 11 U/L (ref 7–52)
ANION GAP SERPL CALCULATED.3IONS-SCNC: 10 MMOL/L
AST SERPL W P-5'-P-CCNC: 18 U/L (ref 13–39)
ATRIAL RATE: 69 BPM
BASOPHILS # BLD AUTO: 0.06 THOUSANDS/ÂΜL (ref 0–0.1)
BASOPHILS NFR BLD AUTO: 1 % (ref 0–1)
BILIRUB SERPL-MCNC: 0.28 MG/DL (ref 0.2–1)
BILIRUB UR QL STRIP: NEGATIVE
BUN SERPL-MCNC: 22 MG/DL (ref 5–25)
CALCIUM SERPL-MCNC: 9.2 MG/DL (ref 8.4–10.2)
CARDIAC TROPONIN I PNL SERPL HS: 12 NG/L (ref 8–18)
CHLORIDE SERPL-SCNC: 101 MMOL/L (ref 96–108)
CLARITY UR: CLEAR
CO2 SERPL-SCNC: 24 MMOL/L (ref 21–32)
COLOR UR: YELLOW
CREAT SERPL-MCNC: 1.32 MG/DL (ref 0.6–1.3)
EOSINOPHIL # BLD AUTO: 0.52 THOUSAND/ÂΜL (ref 0–0.61)
EOSINOPHIL NFR BLD AUTO: 5 % (ref 0–6)
ERYTHROCYTE [DISTWIDTH] IN BLOOD BY AUTOMATED COUNT: 14.9 % (ref 11.6–15.1)
GFR SERPL CREATININE-BSD FRML MDRD: 38 ML/MIN/1.73SQ M
GLUCOSE SERPL-MCNC: 93 MG/DL (ref 65–140)
GLUCOSE UR STRIP-MCNC: NEGATIVE MG/DL
HCT VFR BLD AUTO: 30.4 % (ref 34.8–46.1)
HGB BLD-MCNC: 9.3 G/DL (ref 11.5–15.4)
HGB UR QL STRIP.AUTO: NEGATIVE
IMM GRANULOCYTES # BLD AUTO: 0.03 THOUSAND/UL (ref 0–0.2)
IMM GRANULOCYTES NFR BLD AUTO: 0 % (ref 0–2)
KETONES UR STRIP-MCNC: NEGATIVE MG/DL
LEUKOCYTE ESTERASE UR QL STRIP: NEGATIVE
LYMPHOCYTES # BLD AUTO: 3.01 THOUSANDS/ÂΜL (ref 0.6–4.47)
LYMPHOCYTES NFR BLD AUTO: 31 % (ref 14–44)
MCH RBC QN AUTO: 28.5 PG (ref 26.8–34.3)
MCHC RBC AUTO-ENTMCNC: 30.6 G/DL (ref 31.4–37.4)
MCV RBC AUTO: 93 FL (ref 82–98)
MONOCYTES # BLD AUTO: 0.92 THOUSAND/ÂΜL (ref 0.17–1.22)
MONOCYTES NFR BLD AUTO: 9 % (ref 4–12)
NEUTROPHILS # BLD AUTO: 5.2 THOUSANDS/ÂΜL (ref 1.85–7.62)
NEUTS SEG NFR BLD AUTO: 54 % (ref 43–75)
NITRITE UR QL STRIP: NEGATIVE
NRBC BLD AUTO-RTO: 0 /100 WBCS
P AXIS: 31 DEGREES
PH UR STRIP.AUTO: 6 [PH]
PLATELET # BLD AUTO: 277 THOUSANDS/UL (ref 149–390)
PMV BLD AUTO: 10.7 FL (ref 8.9–12.7)
POTASSIUM SERPL-SCNC: 5.5 MMOL/L (ref 3.5–5.3)
PR INTERVAL: 158 MS
PROT SERPL-MCNC: 6.3 G/DL (ref 6.4–8.4)
PROT UR STRIP-MCNC: NEGATIVE MG/DL
QRS AXIS: -36 DEGREES
QRSD INTERVAL: 138 MS
QT INTERVAL: 458 MS
QTC INTERVAL: 490 MS
RBC # BLD AUTO: 3.26 MILLION/UL (ref 3.81–5.12)
SODIUM SERPL-SCNC: 135 MMOL/L (ref 135–147)
SP GR UR STRIP.AUTO: 1.01 (ref 1–1.03)
T WAVE AXIS: 86 DEGREES
UROBILINOGEN UR QL STRIP.AUTO: 0.2 E.U./DL
VENTRICULAR RATE: 69 BPM
WBC # BLD AUTO: 9.74 THOUSAND/UL (ref 4.31–10.16)

## 2023-11-18 PROCEDURE — 81003 URINALYSIS AUTO W/O SCOPE: CPT | Performed by: INTERNAL MEDICINE

## 2023-11-18 PROCEDURE — 99285 EMERGENCY DEPT VISIT HI MDM: CPT

## 2023-11-18 PROCEDURE — 84484 ASSAY OF TROPONIN QUANT: CPT | Performed by: INTERNAL MEDICINE

## 2023-11-18 PROCEDURE — 99285 EMERGENCY DEPT VISIT HI MDM: CPT | Performed by: INTERNAL MEDICINE

## 2023-11-18 PROCEDURE — 85025 COMPLETE CBC W/AUTO DIFF WBC: CPT | Performed by: INTERNAL MEDICINE

## 2023-11-18 PROCEDURE — 36415 COLL VENOUS BLD VENIPUNCTURE: CPT | Performed by: INTERNAL MEDICINE

## 2023-11-18 PROCEDURE — 93005 ELECTROCARDIOGRAM TRACING: CPT

## 2023-11-18 PROCEDURE — 71045 X-RAY EXAM CHEST 1 VIEW: CPT

## 2023-11-18 PROCEDURE — 80053 COMPREHEN METABOLIC PANEL: CPT | Performed by: INTERNAL MEDICINE

## 2023-11-18 NOTE — ED NOTES
Pt has been accepted to [facility] rehab. Waiting to hear back from case management regarding transport.        China Dyson RN  11/18/23 [id number]

## 2023-11-18 NOTE — ED NOTES
[facility], staff state they have no admissions today to accept patient. [facility] state admission department is not in to clarify if this is true. This nurse also called [id number] to confirm that patient wasn't being sent to neighboring facility. [facility] confirmed no admissions today. [facility] informed that patient is on the way and given [facility] ED charge number to call if patient is being sent back. Full report and discharge instructions were given to transport van.        Edward Diego RN  11/18/23 [id number]

## 2023-11-18 NOTE — ED PROVIDER NOTES
History  Chief Complaint   Patient presents with    Weakness - Generalized     Brought in by EMS, pt slid to ground after her legs gave out, pt reports being weaker than usual     This is an 78years old discharged from [facility] 11/14/2023 as patient was admitted for respiratory failure. Patient came back today complaining of generalized weakness and she stated her arms were shaking her legs were shaking and she uses a walker at home and he does not currently have upper and today she feels that her knees are not going to have an about 2 4 has feelings give away. Patient has no CP, SOB, abdominal pain, nausea vomiting diarrhea. During the admission at [facility] patient has cardiac cath which shows calcification and stenosis of the coronary arteries. Patient also having VQ scan , chest came back low probability for PE. Patient has pulse ox 95% on room air. 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 At the ER patient requested to be admitted to rehab. Prior to Admission Medications   Prescriptions Last Dose Informant Patient Reported? Taking? Blood Glucose Monitoring Suppl (OneTouch Verio Reflect) w/Device KIT   No No   Sig: Check blood sugars twice daily. Please substitute with appropriate alternative as covered by patient's insurance. Dx: E11.65   Milnacipran HCl (Savella) 100 MG TABS   No No   Sig: Take 1 tablet (100 mg total) by mouth daily   OneTouch Delica Lancets 66P MISC   No No   Sig: Check blood sugars twice daily. Please substitute with appropriate alternative as covered by patient's insurance.  Dx: E11.65   albuterol (PROVENTIL HFA,VENTOLIN HFA) 90 mcg/act inhaler  Self No No   Sig: Inhale 2 puffs every 6 (six) hours as needed for wheezing or shortness of breath   aspirin 81 mg chewable tablet  Self No No   Sig: Chew 1 tablet (81 mg total) daily atorvastatin (LIPITOR) 40 mg tablet   No No   Sig: TAKE ONE TABLET BY MOUTH ONCE DAILY   baclofen 10 mg tablet   No No   Sig: TAKE ONE TABLET BY MOUTH THREE TIMES DAILY AS NEEDED FOR MUSCLE SPASM   cholestyramine (QUESTRAN) 4 g packet   No No   Sig: Take 1 packet (4 g total) by mouth 3 (three) times a day with meals   Patient not taking: Reported on 11/10/2023   escitalopram (Lexapro) 20 mg tablet  Self No No   Sig: Take 0.5 tablets (10 mg total) by mouth daily   Patient not taking: Reported on 11/10/2023   ferrous sulfate 325 (65 Fe) mg tablet   No No   Sig: Take 1 tablet (325 mg total) by mouth daily with breakfast Do not start before November 16, 2023. gabapentin (NEURONTIN) 300 mg capsule   No No   Sig: Take 1 capsule (300 mg total) by mouth daily at bedtime   glucose blood (OneTouch Verio) test strip   No No   Sig: Check blood sugars twice daily. Please substitute with appropriate alternative as covered by patient's insurance.  Dx: E11.65   hyoscyamine (ANASPAZ,LEVSIN) 0.125 MG tablet   No No   Sig: Take 1 tablet (0.125 mg total) by mouth every 4 (four) hours as needed for cramping   Patient not taking: Reported on 11/10/2023   latanoprost (XALATAN) 0.005 % ophthalmic solution  Self No No   Sig: Administer 1 drop to both eyes daily at bedtime   levothyroxine 25 mcg tablet  Self No No   Sig: Take 1.5 tablets (37.5 mcg total) by mouth daily   meclizine (ANTIVERT) 25 mg tablet   No No   Sig: Take 1 tablet (25 mg total) by mouth 4 (four) times a day as needed for dizziness   metFORMIN (GLUCOPHAGE) 1000 MG tablet   No No   Sig: Take 1 tablet (1,000 mg total) by mouth 2 (two) times a day with meals   metoclopramide (REGLAN) 10 mg tablet   No No   Sig: Take 1 tablet (10 mg total) by mouth 4 (four) times a day as needed (Nausea and vomiting)   Patient not taking: Reported on 11/10/2023   metoprolol succinate (TOPROL-XL) 50 mg 24 hr tablet   No No   Sig: Take 1 tablet (50 mg total) by mouth every 12 (twelve) hours nystatin (MYCOSTATIN) powder  Self No No   Sig: Apply topically 2 (two) times a day   Patient not taking: Reported on 8/26/2023   oxyCODONE-acetaminophen (PERCOCET)  mg per tablet   No No   Sig: Take 1 tablet by mouth every 6 (six) hours as needed for severe pain Max Daily Amount: 4 tablets   Patient taking differently: Take 1 tablet by mouth every 12 (twelve) hours as needed for severe pain   pantoprazole (PROTONIX) 40 mg tablet  Self No No   Sig: Take 1 tablet (40 mg total) by mouth every 12 (twelve) hours   Patient taking differently: Take 40 mg by mouth daily   ranolazine (RANEXA) 500 mg 12 hr tablet   No No   Sig: Take 1 tablet (500 mg total) by mouth every 12 (twelve) hours   saccharomyces boulardii (FLORASTOR) 250 mg capsule  Self No No   Sig: Take 1 capsule (250 mg total) by mouth 2 (two) times a day   Patient not taking: Reported on 11/10/2023   trospium (SANCTURA XR) 60 mg 24 hr capsule   No No   Sig: Take 1 capsule (60 mg total) by mouth daily before breakfast      Facility-Administered Medications: None       Past Medical History:   Diagnosis Date    Acid reflux     Anemia     hx of iron-deficient    Anxiety     Arthritis     Asthma     last needed inhaler last year 2020    Basal cell carcinoma     upper lip    Chronic narcotic dependence (HCC)     Chronic pain     Colon polyp     Cystocele     Diabetes mellitus ([address])     stable    Disease of thyroid gland     hypothyroidism    Diverticulosis     Dizziness     at times    Dysfunctional uterine bleeding     last assessed - [id number]    Dysphagia     Fibromyalgia     Gastric ulcer     Gastroparesis     History of colonic polyps     last assessed - [id number]    History of gastroesophageal reflux (GERD)     Hypercholesterolemia     Hyperlipidemia     Hypertension     IBS (irritable bowel syndrome)     Pneumobilia 06/18/2022    Post laminectomy syndrome     S/P insertion of spinal cord stimulator 07/18/2018    Seasonal allergies     Shortness of breath     exertional    Spinal stenosis     Status post lumbar spinal fusion 03/16/2018    Stroke [facility])     pt states slight stroke March 2022       Past Surgical History:   Procedure Laterality Date    APPENDECTOMY      BACK SURGERY      BREAST CYST EXCISION Left     CARDIAC CATHETERIZATION  11/14/2023    Procedure: Cardiac catheterization;  Surgeon: Aunm Guthrie MD;  [facility]; Service: Cardiology    CARDIAC CATHETERIZATION N/A 11/14/2023    Procedure: Cardiac Coronary Angiogram;  Surgeon: Anum Guthrie MD;  [facility]: [facility]; Service: Cardiology    CHOLECYSTECTOMY      COLONOSCOPY      ESOPHAGOGASTRODUODENOSCOPY N/A 09/28/2016    Procedure: ESOPHAGOGASTRODUODENOSCOPY (EGD); Surgeon: Augustus Garcia MD;  Location: [facility] GI LAB; Service:     HERNIA REPAIR      HYSTERECTOMY      TTAH-BSO age 25    LAMINECTOMY      LUMBAR LAMINECTOMY      OOPHORECTOMY      age 25    RI ARTHRODESIS POSTERIOR/PSTLAT TQ 1NTRSPC THORACIC N/A 06/04/2018    Procedure: Reopening of lumbar incision for T12-L5 posterior instrumented fixation and fusion and T12-L4 posterior decompression;  Surgeon: Rebecca Sandoval MD;  Location: [facility] MAIN OR;  Service: Neurosurgery    RI COLONOSCOPY FLX DX W/COLLJ SPEC WHEN PFRMD N/A 03/02/2016    Procedure: EGD AND COLONOSCOPY;  Surgeon: Augustus Garcia MD;  Location: [facility] GI LAB; Service: Gastroenterology    RI DILATION [facility] UNGUIDED SOUND/BOUGIE 1/MULT PASS N/A 09/28/2016    Procedure: DILATATION ESOPHAGEAL;  Surgeon: Augustus Garcia MD;  Location: [facility] GI [facility]; Service: Gastroenterology    RI ESOPHAGOGASTRODUODENOSCOPY TRANSORAL DIAGNOSTIC N/A 07/18/2016    Procedure: ESOPHAGOGASTRODUODENOSCOPY (EGD); Surgeon: Augustus Garcia MD;  Location: [facility] GI [facility];   Service: Gastroenterology    RI INSJ/RPLCMT SPI NPGR DIR/INDUXIVE COUPLING Left 06/04/2018    Procedure: removal of left buttock implantable pulse generator and placement of new  implantable pulse generator;  Surgeon: Hossein Singleton Sade Tate MD;  Location: [facility];  Service: Neurosurgery       Family History   Problem Relation Age of Onset    Lung cancer Mother 55    Pulmonary embolism Father     No Known Problems Sister     No Known Problems Daughter     No Known Problems Daughter     Stroke Maternal Grandmother     Heart attack Maternal Grandfather     No Known Problems Paternal Grandmother     No Known Problems Paternal Grandfather     No Known Problems Maternal Aunt     Diabetes Family         Diabetes mellitus    Hypertension Family     Stroke Family         Stroke complications     I have reviewed and agree with the history as documented. E-Cigarette/Vaping    E-Cigarette Use Never User      E-Cigarette/Vaping Substances    Nicotine No     THC No     CBD No     Flavoring No     Other No     Unknown No      Social History     Tobacco Use    Smoking status: Former     Types: Cigarettes     Quit date: 1970     Years since quittin.9    Smokeless tobacco: Never    Tobacco comments:     Denied history of current ever day smoker, Former smoker and Never smoker all documented in [employer]   Vaping Use    Vaping Use: Never used   Substance Use Topics    Alcohol use: Not Currently     Comment: Denied history of alcohol use    Drug use: No     Comment: Denied history of drug use       Review of Systems   Constitutional:  Negative for fatigue and fever. HENT:  Negative for congestion, ear discharge, ear pain, sinus pressure, sneezing, sore throat, tinnitus and trouble swallowing. Respiratory:  Negative for cough and shortness of breath. Cardiovascular:  Negative for chest pain and palpitations. Gastrointestinal:  Negative for abdominal pain, constipation, diarrhea, nausea and vomiting. Genitourinary:  Negative for difficulty urinating, dysuria, flank pain and frequency. Musculoskeletal:  Negative for back pain, myalgias, neck pain and neck stiffness. Skin:  Negative for color change, pallor and rash.    Neurological: Negative for dizziness, light-headedness and headaches. Psychiatric/Behavioral:  Negative for agitation and behavioral problems. Physical Exam  Physical Exam  Vitals and nursing note reviewed. Constitutional:       General: She is not in acute distress. Appearance: Normal appearance. She is well-developed. She is not ill-appearing, toxic-appearing or diaphoretic. HENT:      Head: Normocephalic and atraumatic. Right Ear: Ear canal normal. There is no impacted cerumen. Left Ear: Ear canal normal. There is no impacted cerumen. Nose: No congestion or rhinorrhea. Mouth/Throat:      Pharynx: No oropharyngeal exudate or posterior oropharyngeal erythema. Eyes:      Extraocular Movements: Extraocular movements intact. Pupils: Pupils are equal, round, and reactive to light. Neck:      Vascular: No carotid bruit. Cardiovascular:      Rate and Rhythm: Normal rate and regular rhythm. Heart sounds: Normal heart sounds. No murmur heard. No friction rub. No gallop. Pulmonary:      Effort: Pulmonary effort is normal. No respiratory distress. Breath sounds: Normal breath sounds. No stridor. No wheezing, rhonchi or rales. Chest:      Chest wall: No tenderness. Abdominal:      General: Bowel sounds are normal. There is no distension. Palpations: Abdomen is soft. There is no mass. Tenderness: There is no abdominal tenderness. There is no right CVA tenderness, left CVA tenderness, guarding or rebound. Hernia: No hernia is present. Musculoskeletal:         General: No swelling, tenderness, deformity or signs of injury. Normal range of motion. Cervical back: Normal range of motion and neck supple. No rigidity or tenderness. Right lower leg: No edema. Left lower leg: No edema. Lymphadenopathy:      Cervical: No cervical adenopathy. Skin:     General: Skin is warm and dry. Capillary Refill: Capillary refill takes less than 2 seconds. Coloration: Skin is not jaundiced or pale. Findings: No bruising, erythema, lesion or rash. Neurological:      Mental Status: She is alert and oriented to person, place, and time.    Psychiatric:         Behavior: Behavior normal.         Vital Signs  ED Triage Vitals   Temperature Pulse Respirations Blood Pressure SpO2   11/18/23 1343 11/18/23 1344 11/18/23 1344 11/18/23 1344 11/18/23 1344   98.3 °F (36.8 °C) 70 20 150/68 91 %      Temp Source Heart Rate Source Patient Position - Orthostatic VS BP Location FiO2 (%)   11/18/23 1343 11/18/23 1529 11/18/23 1529 11/18/23 1529 --   Oral Monitor Sitting Left arm       Pain Score       11/18/23 1430       No Pain           Vitals:    11/18/23 1344 11/18/23 1529   BP: 150/68 139/61   Pulse: 70 66   Patient Position - Orthostatic VS:  Sitting         Visual Acuity      ED Medications  Medications - No data to display    Diagnostic Studies  Results Reviewed       Procedure Component Value Units Date/Time    UA w Reflex to Microscopic w Reflex to Culture [[id number]]  (Normal) Collected: 11/18/23 1610    Lab Status: Final result Specimen: Urine, Clean Catch Updated: 11/18/23 1618     Color, UA Yellow     Clarity, UA Clear     Specific Gravity, UA 1.015     pH, UA 6.0     Leukocytes, UA Negative     Nitrite, UA Negative     Protein, UA Negative mg/dl      Glucose, UA Negative mg/dl      Ketones, UA Negative mg/dl      Urobilinogen, UA 0.2 E.U./dl      Bilirubin, UA Negative     Occult Blood, UA Negative    High Sensitivity Troponin I Random [[id number]]  (Normal) Collected: 11/18/23 1403    Lab Status: Final result Specimen: Blood from Arm, Left Updated: 11/18/23 1430     HS TnI random 12 ng/L     Comprehensive metabolic panel [[id number]]  (Abnormal) Collected: 11/18/23 1403    Lab Status: Final result Specimen: Blood from Arm, Left Updated: 11/18/23 1423     Sodium 135 mmol/L      Potassium 5.5 mmol/L      Chloride 101 mmol/L      CO2 24 mmol/L      ANION GAP 10 mmol/L      BUN 22 mg/dL      Creatinine 1.32 mg/dL      Glucose 93 mg/dL      Calcium 9.2 mg/dL      AST 18 U/L      ALT 11 U/L      Alkaline Phosphatase 73 U/L      Total Protein 6.3 g/dL      Albumin 3.7 g/dL      Total Bilirubin 0.28 mg/dL      eGFR 38 ml/min/1.73sq m     Narrative:      [facility] guidelines for Chronic Kidney Disease (CKD):     Stage 1 with normal or high GFR (GFR > 90 mL/min/1.73 square meters)    Stage 2 Mild CKD (GFR = 60-89 mL/min/1.73 square meters)    Stage 3A Moderate CKD (GFR = 45-59 mL/min/1.73 square meters)    Stage 3B Moderate CKD (GFR = 30-44 mL/min/1.73 square meters)    Stage 4 Severe CKD (GFR = 15-29 mL/min/1.73 square meters)    Stage 5 End Stage CKD (GFR <15 mL/min/1.73 square meters)  Note: GFR calculation is accurate only with a steady state creatinine    CBC and differential [[id number]]  (Abnormal) Collected: 11/18/23 1403    Lab Status: Final result Specimen: Blood from Arm, Left Updated: 11/18/23 1408     WBC 9.74 Thousand/uL      RBC 3.26 Million/uL      Hemoglobin 9.3 g/dL      Hematocrit 30.4 %      MCV 93 fL      MCH 28.5 pg      MCHC 30.6 g/dL      RDW 14.9 %      MPV 10.7 fL      Platelets 682 Thousands/uL      nRBC 0 /100 WBCs      Neutrophils Relative 54 %      Immat GRANS % 0 %      Lymphocytes Relative 31 %      Monocytes Relative 9 %      Eosinophils Relative 5 %      Basophils Relative 1 %      Neutrophils Absolute 5.20 Thousands/µL      Immature Grans Absolute 0.03 Thousand/uL      Lymphocytes Absolute 3.01 Thousands/µL      Monocytes Absolute 0.92 Thousand/µL      Eosinophils Absolute 0.52 Thousand/µL      Basophils Absolute 0.06 Thousands/µL                    XR chest 1 view portable    (Results Pending)              Procedures  ECG 12 Lead Documentation Only    Date/Time: 11/18/2023 2:27 PM    Performed by: Vadim Whitfield MD  Authorized by: Vadim Whitfield MD    Indications / Diagnosis:  Weakness  ECG reviewed by me, the ED Provider: yes    Patient location:  ED and bedside  Previous ECG:     Previous ECG:  Compared to current    Similarity:  No change    Comparison to cardiac monitor: Yes    Interpretation:     Interpretation: abnormal    Rate:     ECG rate:  69    ECG rate assessment: normal    Rhythm:     Rhythm: sinus rhythm    Ectopy:     Ectopy: none    QRS:     QRS axis:  Normal  Conduction:     Conduction: abnormal      Abnormal conduction: complete LBBB    ST segments:     ST segments:  Normal  T waves:     T waves: normal             ED Course                                             Medical Decision Making  This is an 78years old came from home for having generalized weakness. Patient is stated she has shakes of the hands and legs and she feels like she cannot walk because of generalized weakness. Patient just discharged from [facility] 4 days ago for acute respiratory failure and at that time patient has cardiac catheterization. Patient to contact [facility] life agency who advised him to go to the ER as she need to be admitted for rehab. The patient daughter came to the ER and stated that she cannot take care of her and she has no resources for that. Patient has no CP, SOB, abdominal pain, nausea vomiting diarrhea, no urinary symptoms. Labs reviewed and CBC shows H/H 9.3/30.4. On his CMP K is 5.5. EKG shows NSR rate 69 , LBBB which is old 1. Contact the  to advise for replacement. Palak Palmer is a  tried to reach the rehab facility on the stated that [facility] accepted her and can take her today .  Palak Palmer , contacted her daughter Melany Gooden [phone]. Amount and/or Complexity of Data Reviewed  Labs: ordered. Details: CMP K 5.5, CR 1.32. WBC 9.74 H/H 9.3/30.4  Radiology: ordered. Details: cxr;no acute cardiopulmonary findings. ECG/medicine tests: ordered.      Details: EKG; NSR rate 69/min, LBBB             Disposition  Final diagnoses: Generalized weakness   Encounter for rehabilitation     Time reflects when diagnosis was documented in both MDM as applicable and the Disposition within this note       Time User Action Codes Description Comment    11/18/2023  4:38 PM Luiza Miller Add [R53.1] Generalized weakness     11/18/2023  4:53 PM Luiza Jefferson Add [Z51.89] Encounter for rehabilitation           ED Disposition       ED Disposition   Discharge    Condition   Stable    Date/Time   Sat Nov 18, 2023  4:52 PM    Comment   Aicha Beans Schrei discharge to home/self care. MD Documentation      [address] Name, [facility]          RN Documentation      Flowsheet Row Most Recent Value   Accepting Facility Name, [facility]          Follow-up Information       Follow up With Specialties Details Why Contact Info    Lucio Carney, [facility], Nurse Practitioner  As needed [address]  [address] [place] [address] Dr  [phone]              Discharge Medication List as of 11/18/2023  4:54 PM        CONTINUE these medications which have NOT CHANGED    Details   albuterol (PROVENTIL HFA,VENTOLIN HFA) 90 mcg/act inhaler Inhale 2 puffs every 6 (six) hours as needed for wheezing or shortness of breath, Starting Wed 6/29/2022, Normal      aspirin 81 mg chewable tablet Chew 1 tablet (81 mg total) daily, Starting Tue 8/9/2022, Until Sat 8/26/2023, No Print      atorvastatin (LIPITOR) 40 mg tablet TAKE ONE TABLET BY MOUTH ONCE DAILY, Normal      baclofen 10 mg tablet TAKE ONE TABLET BY MOUTH THREE TIMES DAILY AS NEEDED FOR MUSCLE SPASM, Normal      Blood Glucose Monitoring Suppl (OneTouch Verio Reflect) w/Device KIT Check blood sugars twice daily. Please substitute with appropriate alternative as covered by patient's insurance.  Dx: E11.65, Normal      cholestyramine (QUESTRAN) 4 g packet Take 1 packet (4 g total) by mouth 3 (three) times a day with meals, Starting Thu 10/27/2022, Normal      escitalopram (Lexapro) 20 mg tablet Take 0.5 tablets (10 mg total) by mouth daily, Starting Wed 6/29/2022, Normal      ferrous sulfate 325 (65 Fe) mg tablet Take 1 tablet (325 mg total) by mouth daily with breakfast Do not start before November 16, 2023., Starting Thu 11/16/2023, Normal      gabapentin (NEURONTIN) 300 mg capsule Take 1 capsule (300 mg total) by mouth daily at bedtime, Starting Wed 11/15/2023, Normal      glucose blood (OneTouch Verio) test strip Check blood sugars twice daily. Please substitute with appropriate alternative as covered by patient's insurance.  Dx: E11.65, Normal      hyoscyamine (ANASPAZ,LEVSIN) 0.125 MG tablet Take 1 tablet (0.125 mg total) by mouth every 4 (four) hours as needed for cramping, Starting Fri 5/26/2023, Normal      latanoprost (XALATAN) 0.005 % ophthalmic solution Administer 1 drop to both eyes daily at bedtime, Starting Fri 10/2/2020, Until Fri 11/10/2023, Normal      levothyroxine 25 mcg tablet Take 1.5 tablets (37.5 mcg total) by mouth daily, Starting Tue 8/9/2022, Until Fri 11/10/2023, Normal      meclizine (ANTIVERT) 25 mg tablet Take 1 tablet (25 mg total) by mouth 4 (four) times a day as needed for dizziness, Starting Sun 3/26/2023, Until Sat 8/26/2023 at 2359, Normal      metFORMIN (GLUCOPHAGE) 1000 MG tablet Take 1 tablet (1,000 mg total) by mouth 2 (two) times a day with meals, Starting Fri 2/10/2023, Normal      metoclopramide (REGLAN) 10 mg tablet Take 1 tablet (10 mg total) by mouth 4 (four) times a day as needed (Nausea and vomiting), Starting Fri 9/9/2022, Normal      metoprolol succinate (TOPROL-XL) 50 mg 24 hr tablet Take 1 tablet (50 mg total) by mouth every 12 (twelve) hours, Starting Wed 11/15/2023, Normal      Milnacipran HCl (Savella) 100 MG TABS Take 1 tablet (100 mg total) by mouth daily, Starting Sun 3/5/2023, Normal      nystatin (MYCOSTATIN) powder Apply topically 2 (two) times a day, Starting Tue 6/21/2022, Normal      OneTouch Delica Lancets 89P MISC Check blood sugars twice daily. Please substitute with appropriate alternative as covered by patient's insurance. Dx: E11.65, Normal      oxyCODONE-acetaminophen (PERCOCET)  mg per tablet Take 1 tablet by mouth every 6 (six) hours as needed for severe pain Max Daily Amount: 4 tablets, Starting Wed 5/24/2023, Normal      pantoprazole (PROTONIX) 40 mg tablet Take 1 tablet (40 mg total) by mouth every 12 (twelve) hours, Starting Mon 7/18/2022, Until Fri 11/10/2023, Normal      ranolazine (RANEXA) 500 mg 12 hr tablet Take 1 tablet (500 mg total) by mouth every 12 (twelve) hours, Starting Wed 11/15/2023, Normal      saccharomyces boulardii (FLORASTOR) 250 mg capsule Take 1 capsule (250 mg total) by mouth 2 (two) times a day, Starting Tue 6/21/2022, Normal      trospium (SANCTURA XR) 60 mg 24 hr capsule Take 1 capsule (60 mg total) by mouth daily before breakfast, Starting Mon 4/3/2023, Normal             No discharge procedures on file.     PDMP Review         Value Time User    PDMP Reviewed  Yes 8/26/2023  9:34 PM Sav Humphrey MD            ED Provider  Electronically Signed by             Tito Barbour MD  11/19/23 8934

## 2023-11-18 NOTE — CASE MANAGEMENT
Case Management Discharge Planning Note    Patient name Cindy Lock  Location ED 05/ED 05 MRN [id number]  : 1945 Date 2023       Current Admission Date: 2023  Current Admission Diagnosis:Weakness generalized   Patient Active Problem List    Diagnosis Date Noted    Non obstructive CAD 11/15/2023    History of lumbar surgery 11/10/2023    Abnormal urinalysis 2023    Chronic obstructive pulmonary disease, unspecified COPD type ([address]) 2023    Confusion with body shakes and blank stare 2023    Normocytic anemia 2023    Bilateral lower extremity edema 2023    Continuous opioid dependence ([address]) 2022    Cerebrovascular accident (CVA), unspecified mechanism ([address]) 2022    Ambulatory dysfunction 2022    Anemia 2022    Acute kidney injury ([address]) 2022    Obesity, morbid ([address]) 2021    Prepyloric ulcer 2021    Diarrhea 2021    Mild intermittent asthma without complication     Iron deficiency anemia secondary to inadequate dietary iron intake 2018    Type 2 diabetes mellitus ([address])     Failed back surgical syndrome 2018    Hypothyroidism 2017    Degenerative lumbar spinal stenosis 2017    Glaucoma 2017    Overactive bladder 2016    Dyspepsia 2016    Hypercholesterolemia 2016    Anxiety 2015    Chronic pain disorder 2013    Hypertension 2013      LOS (days): 0  Geometric Mean LOS (GMLOS) (days):   Days to GMLOS:     OBJECTIVE:            Current admission status: Emergency   Preferred Pharmacy:   [facility] [address] COOPER Miranda 551 [facility] [address]  [address] [place] [address] 1200 [facility]  Phone: [phone] Fax: [phone] 6071 [facility] [address] [place], [place] - [facility] [place] [address]  Phone: [phone] Fax: [phone]    [facility] for [facility] [address] Marilee Miranda  3271 [facility]  425 [facility] [address]  Phone: [phone] Fax: [phone] - [facility], [address]  3060 [facility] [address]  Phone: [phone] Fax: [phone] [address] [place] [address], [address] [place] [address] [facility]  [address]  [address] [place] [address]  Phone: [phone] Fax: [phone]    Primary Care Provider: NANY Botello    Primary Insurance: [employer] [address]  Secondary Insurance:     DISCHARGE DETAILS:  CM sent referrals to [facility] and 1455 Patricio Cherry at [facility]) for inpatient rehab. [facility] is contracted with those facilities. Waiting for response if able to accept direct admission from ED.

## 2023-11-18 NOTE — ED NOTES
Please call rohit (daughter) with any questions, concerns or updates     Pepe Guerrero RN  11/18/23 4891

## 2023-11-18 NOTE — DISCHARGE INSTRUCTIONS
Follow up at [facility]. Labs Reviewed   CBC AND DIFFERENTIAL - Abnormal       Result Value Ref Range Status    WBC 9.74  4.31 - 10.16 Thousand/uL Final    RBC 3.26 (*) 3.81 - 5.12 Million/uL Final    Hemoglobin 9.3 (*) 11.5 - 15.4 g/dL Final    Hematocrit 30.4 (*) 34.8 - 46.1 % Final    MCV 93  82 - 98 fL Final    MCH 28.5  26.8 - 34.3 pg Final    MCHC 30.6 (*) 31.4 - 37.4 g/dL Final    RDW 14.9  11.6 - 15.1 % Final    MPV 10.7  8.9 - 12.7 fL Final    Platelets 487  738 - 390 Thousands/uL Final    nRBC 0  /100 WBCs Final    Neutrophils Relative 54  43 - 75 % Final    Immat GRANS % 0  0 - 2 % Final    Lymphocytes Relative 31  14 - 44 % Final    Monocytes Relative 9  4 - 12 % Final    Eosinophils Relative 5  0 - 6 % Final    Basophils Relative 1  0 - 1 % Final    Neutrophils Absolute 5.20  1.85 - 7.62 Thousands/µL Final    Immature Grans Absolute 0.03  0.00 - 0.20 Thousand/uL Final    Lymphocytes Absolute 3.01  0.60 - 4.47 Thousands/µL Final    Monocytes Absolute 0.92  0.17 - 1.22 Thousand/µL Final    Eosinophils Absolute 0.52  0.00 - 0.61 Thousand/µL Final    Basophils Absolute 0.06  0.00 - 0.10 Thousands/µL Final   COMPREHENSIVE METABOLIC PANEL - Abnormal    Sodium 135  135 - 147 mmol/L Final    Potassium 5.5 (*) 3.5 - 5.3 mmol/L Final    Chloride 101  96 - 108 mmol/L Final    CO2 24  21 - 32 mmol/L Final    ANION GAP 10  mmol/L Final    BUN 22  5 - 25 mg/dL Final    Creatinine 1.32 (*) 0.60 - 1.30 mg/dL Final    Comment: Standardized to IDMS reference method    Glucose 93  65 - 140 mg/dL Final    Comment: If the patient is fasting, the ADA then defines impaired fasting glucose as > 100 mg/dL and diabetes as > or equal to 123 mg/dL. Calcium 9.2  8.4 - 10.2 mg/dL Final    AST 18  13 - 39 U/L Final    Comment: Slightly Hemolyzed:Results may be affected.     ALT 11  7 - 52 U/L Final    Comment: Specimen collection should occur prior to Sulfasalazine administration due to the potential for falsely depressed results. Alkaline Phosphatase 73  34 - 104 U/L Final    Total Protein 6.3 (*) 6.4 - 8.4 g/dL Final    Albumin 3.7  3.5 - 5.0 g/dL Final    Total Bilirubin 0.28  0.20 - 1.00 mg/dL Final    Comment: Use of this assay is not recommended for patients undergoing treatment with eltrombopag due to the potential for falsely elevated results. N-acetyl-p-benzoquinone imine (metabolite of Acetaminophen) will generate erroneously low results in samples for patients that have taken an overdose of Acetaminophen. eGFR 38  ml/min/1.73sq m Final    Narrative:     [facility] guidelines for Chronic Kidney Disease (CKD):     Stage 1 with normal or high GFR (GFR > 90 mL/min/1.73 square meters)    Stage 2 Mild CKD (GFR = 60-89 mL/min/1.73 square meters)    Stage 3A Moderate CKD (GFR = 45-59 mL/min/1.73 square meters)    Stage 3B Moderate CKD (GFR = 30-44 mL/min/1.73 square meters)    Stage 4 Severe CKD (GFR = 15-29 mL/min/1.73 square meters)    Stage 5 End Stage CKD (GFR <15 mL/min/1.73 square meters)  Note: GFR calculation is accurate only with a steady state creatinine   HIGH SENSITIVITY TROPONIN I RANDOM - Normal    HS TnI random 12  8 - 18 ng/L Final    Comment:                                              Initial (time 0) result  If >=50 ng/L, Myocardial injury suggested ;  Type of myocardial injury and treatment strategy  to be determined. If 5-49 ng/L, a delta result at 2 hours and or 4 hours will be needed to further evaluate. If <4 ng/L, and chest pain has been >3 hours since onset, patient may qualify for discharge based on the HEART score in the ED. If <5 ng/L and <3hours since onset of chest pain, a delta result at 2 hours will be needed to further evaluate. HS Troponin 99th Percentile URL of a Health Population=12 ng/L with a 95% Confidence Interval of 8-18 ng/L.     Second Troponin (time 2 hours)  If calculated delta >= 20 ng/L,  Myocardial injury suggested ; Type of myocardial injury and treatment strategy to be determined. If 5-49 ng/L and the calculated delta is 5-19 ng/L, consult medical service for evaluation. Continue evaluation for ischemia on ecg and other possible etiology and repeat hs troponin at 4 hours. If delta is <5 ng/L at 2 hours, consider discharge based on risk stratification via the HEART score (if in ED), or LETY risk score in IP/Observation.     HS Troponin 99th Percentile URL of a Health Population=12 ng/L with a 95% Confidence Interval of 8-18 ng/L.   UA W REFLEX TO MICROSCOPIC WITH REFLEX TO CULTURE - Normal    Color, UA Yellow  Yellow Final    Clarity, UA Clear  Clear Final    Specific Gravity, UA 1.015  1.001 - 1.030 Final    pH, UA 6.0  5.0, 5.5, 6.0, 6.5, 7.0, 7.5, 8.0 Final    Leukocytes, UA Negative  Negative Final    Nitrite, UA Negative  Negative Final    Protein, UA Negative  Negative, Interference- unable to analyze mg/dl Final    Glucose, UA Negative  Negative mg/dl Final    Ketones, UA Negative  Negative mg/dl Final    Urobilinogen, UA 0.2  0.2, 1.0 E.U./dl E.U./dl Final    Bilirubin, UA Negative  Negative Final    Occult Blood, UA Negative  Negative Final     XR chest 1 view portable    (Results Pending)

## 2023-11-18 NOTE — CASE MANAGEMENT
Case Management Discharge Planning Note    Patient name Sal Salter  Location ED 05/ED 05 MRN [id number]  : 1945 Date 2023       Current Admission Date: 2023  Current Admission Diagnosis:Weakness generalized   Patient Active Problem List    Diagnosis Date Noted    Non obstructive CAD 11/15/2023    History of lumbar surgery 11/10/2023    Abnormal urinalysis 2023    Chronic obstructive pulmonary disease, unspecified COPD type ([address]) 2023    Confusion with body shakes and blank stare 2023    Normocytic anemia 2023    Bilateral lower extremity edema 2023    Continuous opioid dependence ([address]) 2022    Cerebrovascular accident (CVA), unspecified mechanism ([address]) 2022    Ambulatory dysfunction 2022    Anemia 2022    Acute kidney injury ([address]) 2022    Obesity, morbid ([address]) 2021    Prepyloric ulcer 2021    Diarrhea 2021    Mild intermittent asthma without complication 21/15/9660    Iron deficiency anemia secondary to inadequate dietary iron intake 2018    Type 2 diabetes mellitus ([address])     Failed back surgical syndrome 2018    Hypothyroidism 2017    Degenerative lumbar spinal stenosis 2017    Glaucoma 2017    Overactive bladder 2016    Dyspepsia 2016    Hypercholesterolemia 2016    Anxiety 2015    Chronic pain disorder 2013    Hypertension 2013      LOS (days): 0  Geometric Mean LOS (GMLOS) (days):   Days to GMLOS:     OBJECTIVE:            Current admission status: Emergency   Preferred Pharmacy:   [facility] [address] COOPER Mckay - [address] [facility] [address]  [address] [place] [address] 1200 [facility]  Phone: [phone] Fax: [phone]    73 [facility] [address] [place] [address] - [facility] [place] [address]  Phone: [phone] Fax: [phone]    [facility] for [facility] #[address] [place] [address] [facility]  425 [facility] [address]  Phone: [phone] Fax: [phone] - Christine, [address]  [address] [place] [address]  Phone: [phone] Fax: [phone] [address] [place] [address], [address] [place] [address] [address]  [address]  [address] [place] [address]  Phone: [phone] Fax: [phone]    Primary Care Provider: NANY Uriostegui    Primary Insurance: [employer]  Secondary Insurance:     DISCHARGE DETAILS:  CM updated patient's transport time is 1800 via [employer]. CM updated nursing and receiving facility admissions. CM updated patient's daughter Anna Bah with  time. All questions/concerns answered at this time.                                                                                                Accepting Facility Name, [address] [facility] [address] : 315 [facility] [address] [place]  Receiving Facility/Agency Phone Number: [phone]  Facility/Agency Fax Number: [phone]

## 2023-11-18 NOTE — CASE MANAGEMENT
Case Management Discharge Planning Note    Patient name Jennifer Finch  Location ED 05/ED 05 MRN [id number]  : 1945 Date 2023       Current Admission Date: 2023  Current Admission Diagnosis:Weakness generalized   Patient Active Problem List    Diagnosis Date Noted    Non obstructive CAD 11/15/2023    History of lumbar surgery 11/10/2023    Abnormal urinalysis 2023    Chronic obstructive pulmonary disease, unspecified COPD type ([address]) 2023    Confusion with body shakes and blank stare 2023    Normocytic anemia 2023    Bilateral lower extremity edema 2023    Continuous opioid dependence ([address]) 2022    Cerebrovascular accident (CVA), unspecified mechanism ([address]) 2022    Ambulatory dysfunction 2022    Anemia 2022    Acute kidney injury ([address]) 2022    Obesity, morbid ([address]) 2021    Prepyloric ulcer 2021    Diarrhea 2021    Mild intermittent asthma without complication 9396    Iron deficiency anemia secondary to inadequate dietary iron intake 2018    Type 2 diabetes mellitus ([address])     Failed back surgical syndrome 2018    Hypothyroidism 2017    Degenerative lumbar spinal stenosis 2017    Glaucoma 2017    Overactive bladder 2016    Dyspepsia 2016    Hypercholesterolemia 2016    Anxiety 2015    Chronic pain disorder 2013    Hypertension 2013      LOS (days): 0  Geometric Mean LOS (GMLOS) (days):   Days to GMLOS:     OBJECTIVE:            Current admission status: Emergency   Preferred Pharmacy:   [facility] [address] COOPER Aguilar - [facility] [address]  [address] [place] [address] 1200 [facility]  Phone: [phone] Fax: [phone] 6071 [facility] [address] [place] [address] - [facility] Bret Tpke  [address] [place] [address]  Phone: [phone] Fax: [phone]    [facility]   Brenda Lee [address] [place] [address] [address] [address]  Phone: [phone] Fax: [phone] - [facility] [place], [address] [address]  Phone: [phone] Fax: [phone] [place] [address] [employer]  [employer]  [address] [place] [address]  Phone: [phone] Fax: [phone]    Primary Care Provider: NANY Meadows    Primary Insurance: [email or web address] [facility] [facility]  Secondary Insurance:     DISCHARGE DETAILS:  CM left detailed voice message with [employer] regarding plan of care.

## 2023-11-18 NOTE — ED NOTES
Pt assisted with bedpan. Jovana care provided. Pt provided boxed lunch.      Zheng Nazario RN  11/18/23 1733

## 2023-11-18 NOTE — CASE MANAGEMENT
Case Management Discharge Planning Note    Patient name Arnulfo Gutierrez  [facility] ED 05/ED 05 MRN [id number]  : 1945 Date 2023       Current Admission Date: 2023  Current Admission Diagnosis:Weakness generalized   Patient Active Problem List    Diagnosis Date Noted    Non obstructive CAD 11/15/2023    History of lumbar surgery 11/10/2023    Abnormal urinalysis 2023    Chronic obstructive pulmonary disease, unspecified COPD type ([address]) 2023    Confusion with body shakes and blank stare 2023    Normocytic anemia 2023    Bilateral lower extremity edema 2023    Continuous opioid dependence ([address]) 2022    Cerebrovascular accident (CVA), unspecified mechanism ([address]) 2022    Ambulatory dysfunction 2022    Anemia 2022    Acute kidney injury ([address]) 2022    Obesity, morbid ([address]) 2021    Prepyloric ulcer 2021    Diarrhea 2021    Mild intermittent asthma without complication     Iron deficiency anemia secondary to inadequate dietary iron intake 2018    Type 2 diabetes mellitus ([address])     Failed back surgical syndrome 2018    Hypothyroidism 2017    Degenerative lumbar spinal stenosis 2017    Glaucoma 2017    Overactive bladder 2016    Dyspepsia 2016    Hypercholesterolemia 2016    Anxiety 2015    Chronic pain disorder 2013    Hypertension 2013      LOS (days): 0  Geometric Mean LOS (GMLOS) (days):   Days to GMLOS:     OBJECTIVE:            Current admission status: Emergency   Preferred Pharmacy:   [facility] [address] COOPER Vasquez - [facility] [address]  [address] [place] [address] 1200 [facility]  Phone: [phone] Fax: [phone] 6071 [facility] [address] [place] [address] - [facility] [place] [address]  Phone: [phone] Fax: [phone]    [facility] for [facility] [address] Marilee Vasquez  0970 [facility]  425 [facility] [address]  Phone: [phone] Fax: [phone] - Jose J, [address]  3060 [facility] [address]  Phone: [phone] Fax: [phone] [address] [place] [address], 10 42 [facility] [address]  [address]  800 [facility] [address]  Phone: [phone] Fax: [phone]    Primary Care Provider: NANY Escobedo    Primary Insurance: [employer]  Secondary Insurance:     DISCHARGE DETAILS:  CM reached out to admission liaison for [facility]. Admission liaison able to accept at [facility] today. CM updated physician. CM contacted patient's daughter Kim Brochure at . CM introduced self and role. Patient's daughter updated [facility] is contracted with Halley Gambino at [facility] for inpatient rehab. Patient's daughter updated [facility] is able to accept today for inpatient rehab. Daughter stated her preference is that facility. Daughter updated CM will work on transport to facility and f/u with  time. CM provided address to daughter on the phone. Patient's daughter stated she will update her sister Carlie Hernandez with plan of care. CM sent referral via [facility]. Waiting for transport time.

## 2023-11-20 ENCOUNTER — VBI (OUTPATIENT)
Dept: FAMILY MEDICINE CLINIC | Facility: CLINIC | Age: 78
End: 2023-11-20

## 2023-11-20 LAB
ATRIAL RATE: 69 BPM
P AXIS: 31 DEGREES
PR INTERVAL: 158 MS
QRS AXIS: -36 DEGREES
QRSD INTERVAL: 138 MS
QT INTERVAL: 458 MS
QTC INTERVAL: 490 MS
T WAVE AXIS: 86 DEGREES
VENTRICULAR RATE: 69 BPM

## 2023-11-20 PROCEDURE — 93010 ELECTROCARDIOGRAM REPORT: CPT | Performed by: INTERNAL MEDICINE

## 2023-11-20 NOTE — TELEPHONE ENCOUNTER
11/20/23 11:24 AM    Patient contacted post ED visit, first outreach attempt made. Message was left for patient to return a call to the VBI Department at [facility]: Phone [phone]. Thank you.   Yelena Rivas MA  [facility]

## 2023-11-20 NOTE — TELEPHONE ENCOUNTER
11/20/23 12:17 PM    Patient contacted post ED visit, VBI department spoke with patient/caregiver and outreach was successful. Thank you.   Lynda Poole MA  [facility]

## 2023-11-26 ENCOUNTER — APPOINTMENT (EMERGENCY)
Dept: RADIOLOGY | Facility: HOSPITAL | Age: 78
DRG: 683 | End: 2023-11-26
Payer: MEDICARE

## 2023-11-26 ENCOUNTER — HOSPITAL ENCOUNTER (INPATIENT)
Facility: HOSPITAL | Age: 78
LOS: 5 days | Discharge: NURSING, REHAB OR LONG-TERM CARE | DRG: 683 | End: 2023-12-01
Attending: EMERGENCY MEDICINE | Admitting: FAMILY MEDICINE
Payer: MEDICARE

## 2023-11-26 DIAGNOSIS — E87.5 HYPERKALEMIA: ICD-10-CM

## 2023-11-26 DIAGNOSIS — R29.90 STROKE-LIKE SYMPTOMS: Primary | ICD-10-CM

## 2023-11-26 DIAGNOSIS — N17.9 AKI (ACUTE KIDNEY INJURY) (HCC): ICD-10-CM

## 2023-11-26 DIAGNOSIS — M54.50 LUMBAR BACK PAIN: ICD-10-CM

## 2023-11-26 DIAGNOSIS — E87.1 HYPONATREMIA: ICD-10-CM

## 2023-11-26 DIAGNOSIS — M96.1 POST LAMINECTOMY SYNDROME: ICD-10-CM

## 2023-11-26 DIAGNOSIS — R31.9 HEMATURIA: ICD-10-CM

## 2023-11-26 DIAGNOSIS — R33.9 URINARY RETENTION: ICD-10-CM

## 2023-11-26 LAB
2HR DELTA HS TROPONIN: 0 NG/L
4HR DELTA HS TROPONIN: 1 NG/L
ANION GAP SERPL CALCULATED.3IONS-SCNC: 10 MMOL/L
APTT PPP: 29 SECONDS (ref 23–37)
ATRIAL RATE: 72 BPM
BUN SERPL-MCNC: 32 MG/DL (ref 5–25)
CALCIUM SERPL-MCNC: 8.8 MG/DL (ref 8.4–10.2)
CARDIAC TROPONIN I PNL SERPL HS: 10 NG/L
CARDIAC TROPONIN I PNL SERPL HS: 9 NG/L
CARDIAC TROPONIN I PNL SERPL HS: 9 NG/L
CHLORIDE SERPL-SCNC: 94 MMOL/L (ref 96–108)
CHOLEST SERPL-MCNC: 120 MG/DL
CO2 SERPL-SCNC: 24 MMOL/L (ref 21–32)
CREAT SERPL-MCNC: 1.58 MG/DL (ref 0.6–1.3)
ERYTHROCYTE [DISTWIDTH] IN BLOOD BY AUTOMATED COUNT: 14.6 % (ref 11.6–15.1)
EST. AVERAGE GLUCOSE BLD GHB EST-MCNC: 140 MG/DL
FLUAV RNA RESP QL NAA+PROBE: NEGATIVE
FLUBV RNA RESP QL NAA+PROBE: NEGATIVE
GFR SERPL CREATININE-BSD FRML MDRD: 30 ML/MIN/1.73SQ M
GLUCOSE SERPL-MCNC: 79 MG/DL (ref 65–140)
GLUCOSE SERPL-MCNC: 82 MG/DL (ref 65–140)
GLUCOSE SERPL-MCNC: 88 MG/DL (ref 65–140)
GLUCOSE SERPL-MCNC: 89 MG/DL (ref 65–140)
HBA1C MFR BLD: 6.5 %
HCT VFR BLD AUTO: 30.2 % (ref 34.8–46.1)
HDLC SERPL-MCNC: 42 MG/DL
HGB BLD-MCNC: 9.7 G/DL (ref 11.5–15.4)
INR PPP: 0.96 (ref 0.84–1.19)
LDLC SERPL CALC-MCNC: 42 MG/DL (ref 0–100)
MCH RBC QN AUTO: 29 PG (ref 26.8–34.3)
MCHC RBC AUTO-ENTMCNC: 32.1 G/DL (ref 31.4–37.4)
MCV RBC AUTO: 90 FL (ref 82–98)
P AXIS: 49 DEGREES
PLATELET # BLD AUTO: 247 THOUSANDS/UL (ref 149–390)
PLATELET # BLD AUTO: 303 THOUSANDS/UL (ref 149–390)
PMV BLD AUTO: 10.2 FL (ref 8.9–12.7)
PMV BLD AUTO: 10.7 FL (ref 8.9–12.7)
POTASSIUM SERPL-SCNC: 5.9 MMOL/L (ref 3.5–5.3)
PR INTERVAL: 176 MS
PROTHROMBIN TIME: 12.7 SECONDS (ref 11.6–14.5)
QRS AXIS: -19 DEGREES
QRSD INTERVAL: 152 MS
QT INTERVAL: 538 MS
QTC INTERVAL: 589 MS
RBC # BLD AUTO: 3.35 MILLION/UL (ref 3.81–5.12)
RSV RNA RESP QL NAA+PROBE: NEGATIVE
SARS-COV-2 RNA RESP QL NAA+PROBE: NEGATIVE
SODIUM SERPL-SCNC: 128 MMOL/L (ref 135–147)
T WAVE AXIS: 56 DEGREES
TRIGL SERPL-MCNC: 179 MG/DL
TSH SERPL DL<=0.05 MIU/L-ACNC: 3.9 UIU/ML (ref 0.45–4.5)
VENTRICULAR RATE: 72 BPM
WBC # BLD AUTO: 9.92 THOUSAND/UL (ref 4.31–10.16)

## 2023-11-26 PROCEDURE — 85027 COMPLETE CBC AUTOMATED: CPT | Performed by: EMERGENCY MEDICINE

## 2023-11-26 PROCEDURE — 84484 ASSAY OF TROPONIN QUANT: CPT | Performed by: EMERGENCY MEDICINE

## 2023-11-26 PROCEDURE — 96374 THER/PROPH/DIAG INJ IV PUSH: CPT

## 2023-11-26 PROCEDURE — 85730 THROMBOPLASTIN TIME PARTIAL: CPT | Performed by: EMERGENCY MEDICINE

## 2023-11-26 PROCEDURE — 93010 ELECTROCARDIOGRAM REPORT: CPT | Performed by: INTERNAL MEDICINE

## 2023-11-26 PROCEDURE — 36415 COLL VENOUS BLD VENIPUNCTURE: CPT | Performed by: EMERGENCY MEDICINE

## 2023-11-26 PROCEDURE — 84443 ASSAY THYROID STIM HORMONE: CPT | Performed by: FAMILY MEDICINE

## 2023-11-26 PROCEDURE — 80061 LIPID PANEL: CPT | Performed by: NURSE PRACTITIONER

## 2023-11-26 PROCEDURE — 82948 REAGENT STRIP/BLOOD GLUCOSE: CPT

## 2023-11-26 PROCEDURE — 71275 CT ANGIOGRAPHY CHEST: CPT

## 2023-11-26 PROCEDURE — 99285 EMERGENCY DEPT VISIT HI MDM: CPT

## 2023-11-26 PROCEDURE — 0241U HB NFCT DS VIR RESP RNA 4 TRGT: CPT | Performed by: EMERGENCY MEDICINE

## 2023-11-26 PROCEDURE — 74174 CTA ABD&PLVS W/CONTRAST: CPT

## 2023-11-26 PROCEDURE — 99285 EMERGENCY DEPT VISIT HI MDM: CPT | Performed by: EMERGENCY MEDICINE

## 2023-11-26 PROCEDURE — G1004 CDSM NDSC: HCPCS

## 2023-11-26 PROCEDURE — 99223 1ST HOSP IP/OBS HIGH 75: CPT | Performed by: FAMILY MEDICINE

## 2023-11-26 PROCEDURE — 85610 PROTHROMBIN TIME: CPT | Performed by: EMERGENCY MEDICINE

## 2023-11-26 PROCEDURE — 99245 OFF/OP CONSLTJ NEW/EST HI 55: CPT | Performed by: STUDENT IN AN ORGANIZED HEALTH CARE EDUCATION/TRAINING PROGRAM

## 2023-11-26 PROCEDURE — 80048 BASIC METABOLIC PNL TOTAL CA: CPT | Performed by: EMERGENCY MEDICINE

## 2023-11-26 PROCEDURE — 85049 AUTOMATED PLATELET COUNT: CPT | Performed by: FAMILY MEDICINE

## 2023-11-26 PROCEDURE — 83036 HEMOGLOBIN GLYCOSYLATED A1C: CPT | Performed by: NURSE PRACTITIONER

## 2023-11-26 PROCEDURE — 93005 ELECTROCARDIOGRAM TRACING: CPT

## 2023-11-26 PROCEDURE — 70496 CT ANGIOGRAPHY HEAD: CPT

## 2023-11-26 PROCEDURE — 70498 CT ANGIOGRAPHY NECK: CPT

## 2023-11-26 RX ORDER — CLOPIDOGREL BISULFATE 75 MG/1
300 TABLET ORAL ONCE
Status: COMPLETED | OUTPATIENT
Start: 2023-11-26 | End: 2023-11-26

## 2023-11-26 RX ORDER — INSULIN LISPRO 100 [IU]/ML
1-5 INJECTION, SOLUTION INTRAVENOUS; SUBCUTANEOUS
Status: DISCONTINUED | OUTPATIENT
Start: 2023-11-26 | End: 2023-12-01 | Stop reason: HOSPADM

## 2023-11-26 RX ORDER — OXYCODONE HYDROCHLORIDE AND ACETAMINOPHEN 5; 325 MG/1; MG/1
2 TABLET ORAL EVERY 12 HOURS PRN
Status: DISCONTINUED | OUTPATIENT
Start: 2023-11-26 | End: 2023-12-01 | Stop reason: HOSPADM

## 2023-11-26 RX ORDER — HEPARIN SODIUM 5000 [USP'U]/ML
5000 INJECTION, SOLUTION INTRAVENOUS; SUBCUTANEOUS EVERY 8 HOURS SCHEDULED
Status: DISCONTINUED | OUTPATIENT
Start: 2023-11-26 | End: 2023-12-01 | Stop reason: HOSPADM

## 2023-11-26 RX ORDER — DIPHENHYDRAMINE HYDROCHLORIDE 50 MG/ML
50 INJECTION INTRAMUSCULAR; INTRAVENOUS ONCE
Status: COMPLETED | OUTPATIENT
Start: 2023-11-26 | End: 2023-11-26

## 2023-11-26 RX ORDER — RANOLAZINE 500 MG/1
500 TABLET, EXTENDED RELEASE ORAL EVERY 12 HOURS SCHEDULED
Status: DISCONTINUED | OUTPATIENT
Start: 2023-11-26 | End: 2023-12-01 | Stop reason: HOSPADM

## 2023-11-26 RX ORDER — FERROUS SULFATE 325(65) MG
325 TABLET ORAL
Status: DISCONTINUED | OUTPATIENT
Start: 2023-11-27 | End: 2023-12-01 | Stop reason: HOSPADM

## 2023-11-26 RX ORDER — ALBUTEROL SULFATE 90 UG/1
2 AEROSOL, METERED RESPIRATORY (INHALATION) EVERY 6 HOURS PRN
Status: DISCONTINUED | OUTPATIENT
Start: 2023-11-26 | End: 2023-12-01 | Stop reason: HOSPADM

## 2023-11-26 RX ORDER — METOPROLOL SUCCINATE 50 MG/1
50 TABLET, EXTENDED RELEASE ORAL EVERY 12 HOURS SCHEDULED
Status: DISCONTINUED | OUTPATIENT
Start: 2023-11-26 | End: 2023-12-01 | Stop reason: HOSPADM

## 2023-11-26 RX ORDER — ASPIRIN 325 MG
325 TABLET ORAL ONCE
Status: COMPLETED | OUTPATIENT
Start: 2023-11-26 | End: 2023-11-26

## 2023-11-26 RX ORDER — PANTOPRAZOLE SODIUM 40 MG/1
40 TABLET, DELAYED RELEASE ORAL DAILY
Status: DISCONTINUED | OUTPATIENT
Start: 2023-11-27 | End: 2023-12-01 | Stop reason: HOSPADM

## 2023-11-26 RX ORDER — CLOPIDOGREL BISULFATE 75 MG/1
75 TABLET ORAL ONCE
Status: DISCONTINUED | OUTPATIENT
Start: 2023-11-26 | End: 2023-11-26

## 2023-11-26 RX ORDER — LATANOPROST 50 UG/ML
1 SOLUTION/ DROPS OPHTHALMIC
Status: DISCONTINUED | OUTPATIENT
Start: 2023-11-26 | End: 2023-12-01 | Stop reason: HOSPADM

## 2023-11-26 RX ORDER — LEVOTHYROXINE SODIUM 0.07 MG/1
37.5 TABLET ORAL DAILY
Status: DISCONTINUED | OUTPATIENT
Start: 2023-11-27 | End: 2023-12-01 | Stop reason: HOSPADM

## 2023-11-26 RX ORDER — OXYBUTYNIN CHLORIDE 10 MG/1
10 TABLET, EXTENDED RELEASE ORAL DAILY
Status: DISCONTINUED | OUTPATIENT
Start: 2023-11-27 | End: 2023-11-30

## 2023-11-26 RX ADMIN — RANOLAZINE 500 MG: 500 TABLET, FILM COATED, EXTENDED RELEASE ORAL at 20:55

## 2023-11-26 RX ADMIN — METOPROLOL SUCCINATE 50 MG: 50 TABLET, EXTENDED RELEASE ORAL at 20:55

## 2023-11-26 RX ADMIN — CLOPIDOGREL BISULFATE 300 MG: 75 TABLET ORAL at 14:25

## 2023-11-26 RX ADMIN — HEPARIN SODIUM 5000 UNITS: 5000 INJECTION INTRAVENOUS; SUBCUTANEOUS at 22:41

## 2023-11-26 RX ADMIN — DIPHENHYDRAMINE HYDROCHLORIDE 50 MG: 50 INJECTION, SOLUTION INTRAMUSCULAR; INTRAVENOUS at 13:47

## 2023-11-26 RX ADMIN — ASPIRIN 325 MG ORAL TABLET 325 MG: 325 PILL ORAL at 14:25

## 2023-11-26 RX ADMIN — IOHEXOL 64 ML: 350 INJECTION, SOLUTION INTRAVENOUS at 14:00

## 2023-11-26 RX ADMIN — IOHEXOL 85 ML: 350 INJECTION, SOLUTION INTRAVENOUS at 14:02

## 2023-11-26 RX ADMIN — OXYCODONE HYDROCHLORIDE AND ACETAMINOPHEN 2 TABLET: 5; 325 TABLET ORAL at 19:47

## 2023-11-26 RX ADMIN — SODIUM CHLORIDE 1000 ML: 0.9 INJECTION, SOLUTION INTRAVENOUS at 15:22

## 2023-11-26 NOTE — ASSESSMENT & PLAN NOTE
Patient is on Percocet 10-3 25 every 12 hours as needed pain ( Pt usually takes it once per day if needed)   PDMP was reviewed  Continue

## 2023-11-26 NOTE — ED PROVIDER NOTES
History  Chief Complaint   Patient presents with    STROKE Alert     78-year-old female patient with history of diabetes, hypothyroid, HLD, HTN presenting with left-sided facial numbness and left arm numbness and weakness onset today at 8 AM.  Symptoms started with pain midline to her back. Patient states then had hot numbness to her left hand and her left face. No chest pain or shortness of breath. Patient denies any fevers, cold symptoms, urinary symptoms. Denies any recent falls. Prior to Admission Medications   Prescriptions Last Dose Informant Patient Reported? Taking? Blood Glucose Monitoring Suppl (OneTouch Verio Reflect) w/Device KIT 11/25/2023  No Yes   Sig: Check blood sugars twice daily. Please substitute with appropriate alternative as covered by patient's insurance. Dx: E11.65   Milnacipran HCl (Savella) 100 MG TABS 11/26/2023  No Yes   Sig: Take 1 tablet (100 mg total) by mouth daily   OneTouch Delica Lancets 03M MISC 11/25/2023  No Yes   Sig: Check blood sugars twice daily. Please substitute with appropriate alternative as covered by patient's insurance.  Dx: E11.65   albuterol (PROVENTIL HFA,VENTOLIN HFA) 90 mcg/act inhaler Past Month Self No Yes   Sig: Inhale 2 puffs every 6 (six) hours as needed for wheezing or shortness of breath   aspirin 81 mg chewable tablet  Self No No   Sig: Chew 1 tablet (81 mg total) daily   atorvastatin (LIPITOR) 40 mg tablet 11/25/2023  No Yes   Sig: TAKE ONE TABLET BY MOUTH ONCE DAILY   baclofen 10 mg tablet 11/25/2023  No Yes   Sig: TAKE ONE TABLET BY MOUTH THREE TIMES DAILY AS NEEDED FOR MUSCLE SPASM   cholestyramine (QUESTRAN) 4 g packet 11/25/2023  No Yes   Sig: Take 1 packet (4 g total) by mouth 3 (three) times a day with meals   escitalopram (Lexapro) 20 mg tablet 11/25/2023 Self No Yes   Sig: Take 0.5 tablets (10 mg total) by mouth daily   ferrous sulfate 325 (65 Fe) mg tablet 11/25/2023  No Yes   Sig: Take 1 tablet (325 mg total) by mouth daily with breakfast Do not start before November 16, 2023. gabapentin (NEURONTIN) 300 mg capsule Not Taking  No No   Sig: Take 1 capsule (300 mg total) by mouth daily at bedtime   Patient not taking: Reported on 11/26/2023   glucose blood (OneTouch Verio) test strip 11/25/2023  No Yes   Sig: Check blood sugars twice daily. Please substitute with appropriate alternative as covered by patient's insurance.  Dx: E11.65   hyoscyamine (ANASPAZ,LEVSIN) 0.125 MG tablet Not Taking  No No   Sig: Take 1 tablet (0.125 mg total) by mouth every 4 (four) hours as needed for cramping   Patient not taking: Reported on 11/10/2023   latanoprost (XALATAN) 0.005 % ophthalmic solution  Self No No   Sig: Administer 1 drop to both eyes daily at bedtime   levothyroxine 25 mcg tablet  Self No No   Sig: Take 1.5 tablets (37.5 mcg total) by mouth daily   meclizine (ANTIVERT) 25 mg tablet   No No   Sig: Take 1 tablet (25 mg total) by mouth 4 (four) times a day as needed for dizziness   metFORMIN (GLUCOPHAGE) 1000 MG tablet 11/26/2023  No Yes   Sig: Take 1 tablet (1,000 mg total) by mouth 2 (two) times a day with meals   metoclopramide (REGLAN) 10 mg tablet Not Taking  No No   Sig: Take 1 tablet (10 mg total) by mouth 4 (four) times a day as needed (Nausea and vomiting)   Patient not taking: Reported on 11/10/2023   metoprolol succinate (TOPROL-XL) 50 mg 24 hr tablet 11/26/2023  No Yes   Sig: Take 1 tablet (50 mg total) by mouth every 12 (twelve) hours   nystatin (MYCOSTATIN) powder Not Taking Self No No   Sig: Apply topically 2 (two) times a day   Patient not taking: Reported on 8/26/2023   oxyCODONE-acetaminophen (PERCOCET)  mg per tablet 11/25/2023  No Yes   Sig: Take 1 tablet by mouth every 6 (six) hours as needed for severe pain Max Daily Amount: 4 tablets   Patient taking differently: Take 1 tablet by mouth every 12 (twelve) hours as needed for severe pain   pantoprazole (PROTONIX) 40 mg tablet  Self No No   Sig: Take 1 tablet (40 mg total) by mouth every 12 (twelve) hours   Patient taking differently: Take 40 mg by mouth daily   ranolazine (RANEXA) 500 mg 12 hr tablet 11/25/2023  No Yes   Sig: Take 1 tablet (500 mg total) by mouth every 12 (twelve) hours   saccharomyces boulardii (FLORASTOR) 250 mg capsule Not Taking Self No No   Sig: Take 1 capsule (250 mg total) by mouth 2 (two) times a day   Patient not taking: Reported on 11/10/2023   trospium (SANCTURA XR) 60 mg 24 hr capsule 11/25/2023  No Yes   Sig: Take 1 capsule (60 mg total) by mouth daily before breakfast      Facility-Administered Medications: None       Past Medical History:   Diagnosis Date    Acid reflux     Anemia     hx of iron-deficient    Anxiety     Arthritis     Asthma     last needed inhaler last year 2020    Basal cell carcinoma     upper lip    Chronic narcotic dependence (HCC)     Chronic pain     Colon polyp     Cystocele     Diabetes mellitus ([address])     stable    Disease of thyroid gland     hypothyroidism    Diverticulosis     Dizziness     at times    Dysfunctional uterine bleeding     last assessed - [id number]    Dysphagia     Fibromyalgia     Gastric ulcer     Gastroparesis     History of colonic polyps     last assessed - [id number]    History of gastroesophageal reflux (GERD)     Hypercholesterolemia     Hyperlipidemia     Hypertension     IBS (irritable bowel syndrome)     Pneumobilia 06/18/2022    Post laminectomy syndrome     S/P insertion of spinal cord stimulator 07/18/2018    Seasonal allergies     Shortness of breath     exertional    Spinal stenosis     Status post lumbar spinal fusion 03/16/2018    Stroke [facility])     pt states slight stroke March 2022       Past Surgical History:   Procedure Laterality Date    APPENDECTOMY      BACK SURGERY      BREAST CYST EXCISION Left     CARDIAC CATHETERIZATION  11/14/2023    Procedure: Cardiac catheterization;  Surgeon: Syeda Cash MD;  [facility];   Service: Cardiology    CARDIAC CATHETERIZATION N/A 11/14/2023    Procedure: Cardiac Coronary Angiogram;  Surgeon: Garrison Mckinley MD;  [facility]; Service: Cardiology    CHOLECYSTECTOMY      COLONOSCOPY      ESOPHAGOGASTRODUODENOSCOPY N/A 09/28/2016    Procedure: ESOPHAGOGASTRODUODENOSCOPY (EGD); Surgeon: Anna Moss MD;  Location: [facility]; Service:     HERNIA REPAIR      HYSTERECTOMY      TTAH-BSO age 25    LAMINECTOMY      LUMBAR LAMINECTOMY      OOPHORECTOMY      age 25    IA ARTHRODESIS POSTERIOR/PSTLAT TQ 1NTRSPC THORACIC N/A 06/04/2018    Procedure: Reopening of lumbar incision for T12-L5 posterior instrumented fixation and fusion and T12-L4 posterior decompression;  Surgeon: Pau Donovan MD;  Location: [facility];  Service: Neurosurgery    IA COLONOSCOPY FLX DX W/COLLJ SPEC WHEN PFRMD N/A 03/02/2016    Procedure: EGD AND COLONOSCOPY;  Surgeon: Anna Moss MD;  Location: [facility]; Service: Gastroenterology    IA DILATION [facility] UNGUIDED SOUND/BOUGIE 1/MULT PASS N/A 09/28/2016    Procedure: DILATATION ESOPHAGEAL;  Surgeon: Anna Moss MD;  [facility]: [facility]; Service: Gastroenterology    IA ESOPHAGOGASTRODUODENOSCOPY TRANSORAL DIAGNOSTIC N/A 07/18/2016    Procedure: ESOPHAGOGASTRODUODENOSCOPY (EGD); Surgeon: Anna Moss MD;  Location: [facility];   Service: Gastroenterology    IA INSJ/RPLCMT SPI NPGR DIR/INDUXIVE COUPLING Left 06/04/2018    Procedure: removal of left buttock implantable pulse generator and placement of new  implantable pulse generator;  Surgeon: Pau Donovan MD;  [facility];  Service: Neurosurgery       Family History   Problem Relation Age of Onset    Lung cancer Mother 55    Pulmonary embolism Father     No Known Problems Sister     No Known Problems Daughter     No Known Problems Daughter     Stroke Maternal Grandmother     Heart attack Maternal Grandfather     No Known Problems Paternal Grandmother     No Known Problems Paternal Grandfather     No Known Problems Maternal Aunt     Diabetes Family         Diabetes mellitus    Hypertension Family     Stroke Family         Stroke complications     I have reviewed and agree with the history as documented. E-Cigarette/Vaping    E-Cigarette Use Never User      E-Cigarette/Vaping Substances    Nicotine No     THC No     CBD No     Flavoring No     Other No     Unknown No      Social History     Tobacco Use    Smoking status: Former     Types: Cigarettes     Quit date: 1970     Years since quittin.9    Smokeless tobacco: Never    Tobacco comments:     Denied history of current ever day smoker, Former smoker and Never smoker all documented in [employer]   Vaping Use    Vaping Use: Never used   Substance Use Topics    Alcohol use: Not Currently     Comment: Denied history of alcohol use    Drug use: No     Comment: Denied history of drug use        Review of Systems   Neurological:  Positive for numbness. All other systems reviewed and are negative. Physical Exam  ED Triage Vitals   Temperature Pulse Respirations Blood Pressure SpO2   23 1337 23 1337 23 1337 23 1337 23 1333   (!) 97.3 °F (36.3 °C) 83 18 125/64 98 %      Temp Source Heart Rate Source Patient Position - Orthostatic VS BP Location FiO2 (%)   23 1337 23 1337 23 1337 23 1719 --   Tympanic Monitor Lying Right arm       Pain Score       23 1422       8             Orthostatic Vital Signs  Vitals:    23 1422 23 1437 23 1507 23 1719   BP: 129/59 136/63 115/57 133/62   Pulse: 74 74 72 72   Patient Position - Orthostatic VS: Lying Lying Lying Lying       Physical Exam  Vitals reviewed. Constitutional:       Appearance: Normal appearance. HENT:      Head: Normocephalic and atraumatic. Nose: Nose normal.      Mouth/Throat:      Mouth: Mucous membranes are moist.      Pharynx: Oropharynx is clear. Eyes:      Extraocular Movements: Extraocular movements intact.       Conjunctiva/sclera: Conjunctivae normal.      Pupils: Pupils are equal, round, and reactive to light. Cardiovascular:      Rate and Rhythm: Normal rate and regular rhythm. Pulses: Normal pulses. Heart sounds: Normal heart sounds. Pulmonary:      Effort: Pulmonary effort is normal.      Breath sounds: Normal breath sounds. Abdominal:      General: Bowel sounds are normal.      Palpations: Abdomen is soft. Tenderness: There is no abdominal tenderness. Musculoskeletal:         General: Normal range of motion. Cervical back: Normal range of motion. Skin:     General: Skin is warm and dry. Neurological:      General: No focal deficit present. Mental Status: She is alert and oriented to person, place, and time. Mental status is at baseline. Cranial Nerves: Cranial nerve deficit (Left-sided facial droop) present. Motor: Weakness (3 out of 5 strength left lower extremity) present.          ED Medications  Medications   insulin lispro (HumaLOG) 100 units/mL subcutaneous injection 1-5 Units ( Subcutaneous Not Given 11/26/23 1716)   diphenhydrAMINE (BENADRYL) injection 50 mg (50 mg Intravenous Given 11/26/23 1347)   iohexol (OMNIPAQUE) 350 MG/ML injection (MULTI-DOSE) 64 mL (64 mL Intravenous Given 11/26/23 1400)   iohexol (OMNIPAQUE) 350 MG/ML injection (MULTI-DOSE) 85 mL (85 mL Intravenous Given 11/26/23 1402)   aspirin tablet 325 mg (325 mg Oral Given 11/26/23 1425)   clopidogrel (PLAVIX) tablet 300 mg (300 mg Oral Given 11/26/23 1425)   sodium chloride 0.9 % bolus 1,000 mL (1,000 mL Intravenous New Bag 11/26/23 1522)       Diagnostic Studies  Results Reviewed       Procedure Component Value Units Date/Time    HS Troponin I 4hr [[id number]]  (Normal) Collected: 11/26/23 1812    Lab Status: Final result Specimen: Blood from Line, Venous Updated: 11/26/23 1859     hs TnI 4hr 10 ng/L      Delta 4hr hsTnI 1 ng/L     Fingerstick Glucose (POCT) [[id number]]  (Normal) Collected: 11/26/23 1717    Lab Status: Final result Updated: 11/26/23 1719     POC Glucose 82 mg/dl     TSH, 3rd generation with Free T4 reflex [[id number]]  (Normal) Collected: 11/26/23 1604    Lab Status: Final result Specimen: Blood from Line, Venous Updated: 11/26/23 1651     TSH 3RD GENERATON 3.899 uIU/mL     Hemoglobin A1C [[id number]]  (Abnormal) Collected: 11/26/23 1355    Lab Status: Final result Specimen: Blood from Arm, Right Updated: 11/26/23 1647     Hemoglobin A1C 6.5 %       mg/dl     HS Troponin I 2hr [[id number]]  (Normal) Collected: 11/26/23 1604    Lab Status: Final result Specimen: Blood from Line, Venous Updated: 11/26/23 1642     hs TnI 2hr 9 ng/L      Delta 2hr hsTnI 0 ng/L     Lipid Panel with Direct LDL reflex [[id number]]  (Abnormal) Collected: 11/26/23 1355    Lab Status: Final result Specimen: Blood from Line, Venous Updated: 11/26/23 1633     Cholesterol 120 mg/dL      Triglycerides 179 mg/dL      HDL, Direct 42 mg/dL      LDL Calculated 42 mg/dL     HS Troponin 0hr (reflex protocol) [[id number]]  (Normal) Collected: 11/26/23 1355    Lab Status: Final result Specimen: Blood from Line, Venous Updated: 11/26/23 1505     hs TnI 0hr 9 ng/L     FLU/RSV/COVID - if FLU/RSV clinically relevant [[id number]]  (Normal) Collected: 11/26/23 1355    Lab Status: Final result Specimen: Nares from Nose Updated: 11/26/23 1457     SARS-CoV-2 Negative     INFLUENZA A PCR Negative     INFLUENZA B PCR Negative     RSV PCR Negative    Narrative:      FOR PEDIATRIC PATIENTS - copy/paste COVID Guidelines URL to browser: [email or web address] ashx    SARS-CoV-2 assay is a Nucleic Acid Amplification assay intended for the  qualitative detection of nucleic acid from SARS-CoV-2 in nasopharyngeal  swabs. Results are for the presumptive identification of SARS-CoV-2 RNA.     Positive results are indicative of infection with SARS-CoV-2, the virus  causing COVID-19, but do not rule out bacterial infection or co-infection  with other viruses. Laboratories within the [facility] and its  territories are required to report all positive results to the appropriate  public health authorities. Negative results do not preclude SARS-CoV-2  infection and should not be used as the sole basis for treatment or other  patient management decisions. Negative results must be combined with  clinical observations, patient history, and epidemiological information. This test has not been FDA cleared or approved. This test has been authorized by FDA under an Emergency Use Authorization  (EUA). This test is only authorized for the duration of time the  declaration that circumstances exist justifying the authorization of the  emergency use of an in vitro diagnostic tests for detection of SARS-CoV-2  virus and/or diagnosis of COVID-19 infection under section 564(b)(1) of  the Act, 21 U. S.C. 014RST-0(O)(5), unless the authorization is terminated  or revoked sooner. The test has been validated but independent review by FDA  and CLIA is pending. Test performed using [employer] GeneXpert: This RT-PCR assay targets N2,  a region unique to SARS-CoV-2. A conserved region in the E-gene was chosen  for pan-Sarbecovirus detection which includes SARS-CoV-2. According to CMS-2020-01-R, this platform meets the definition of high-throughput technology.     Basic metabolic panel [[id number]]  (Abnormal) Collected: 11/26/23 1355    Lab Status: Final result Specimen: Blood from Line, Venous Updated: 11/26/23 1436     Sodium 128 mmol/L      Potassium 5.9 mmol/L      Chloride 94 mmol/L      CO2 24 mmol/L      ANION GAP 10 mmol/L      BUN 32 mg/dL      Creatinine 1.58 mg/dL      Glucose 89 mg/dL      Calcium 8.8 mg/dL      eGFR 30 ml/min/1.73sq m     Narrative:      [place] guidelines for Chronic Kidney Disease (CKD):     Stage 1 with normal or high GFR (GFR > 90 mL/min/1.73 square meters)    Stage 2 Mild CKD (GFR = 60-89 mL/min/1.73 square meters)    Stage 3A Moderate CKD (GFR = 45-59 mL/min/1.73 square meters)    Stage 3B Moderate CKD (GFR = 30-44 mL/min/1.73 square meters)    Stage 4 Severe CKD (GFR = 15-29 mL/min/1.73 square meters)    Stage 5 End Stage CKD (GFR <15 mL/min/1.73 square meters)  Note: GFR calculation is accurate only with a steady state creatinine    Protime-INR [[id number]]  (Normal) Collected: 11/26/23 1355    Lab Status: Final result Specimen: Blood from Arm, Right Updated: 11/26/23 1431     Protime 12.7 seconds      INR 0.96    APTT [[id number]]  (Normal) Collected: 11/26/23 1355    Lab Status: Final result Specimen: Blood from Arm, Right Updated: 11/26/23 1431     PTT 29 seconds     Fingerstick Glucose (POCT) [[id number]]  (Normal) Collected: 11/26/23 1409    Lab Status: Final result Updated: 11/26/23 1410     POC Glucose 88 mg/dl     CBC and Platelet [[id number]]  (Abnormal) Collected: 11/26/23 1355    Lab Status: Final result Specimen: Blood from Arm, Right Updated: 11/26/23 1409     WBC 9.92 Thousand/uL      RBC 3.35 Million/uL      Hemoglobin 9.7 g/dL      Hematocrit 30.2 %      MCV 90 fL      MCH 29.0 pg      MCHC 32.1 g/dL      RDW 14.6 %      Platelets 352 Thousands/uL      MPV 10.7 fL                    CTA dissection protocol chest/abdomen/pelvis   Final Result by Marc Mckeon MD (11/26 3329)      1. No acute aortic dissection. 2.  Moderate fecal stasis               Workstation performed: [id number]         CTA stroke alert (head/neck)   Final Result by Luisito Islas MD (11/26 7515)   1. CTA head: Stable moderate stenosis at the distal right MCA M1 segment compared to the prior 10/17/2023 examination. Unchanged focal moderate stenosis involving the inferior basilar artery with infundibular dilatation at the right AICA origin. 2. CTA neck: Moderate right extracranial ICA stenosis. The cervical vertebral arteries are patent.       Findings were directly discussed with Dr. Viktor Vazquez at [facility] AT [facility] p.m.                           Workstation performed: [id number]         CT stroke alert brain   Final Result by Matt Gamble MD (11/26 1443)      No acute intracranial abnormality. Chronic microangiopathic ischemic changes. Findings were directly discussed with Dr. To Gillette at 2:40 p.m. Workstation performed: [id number]         CT head wo contrast    (Results Pending)         Procedures  Procedures      ED Course  ED Course as of 11/26/23 1914   [place]  Nov 26, 2023 1914 EKG: normal EKG, normal sinus rhythm, unchanged from previous tracings, LBBB                 Identification of Seniors at [address] Most Recent Value   (ISAR) Identification of Seniors at Risk    Before the illness or injury that brought you to the Emergency, did you need someone to help you on a regular basis? 1 Filed at: 11/26/2023 1433   In the last 24 hours, have you needed more help than usual? 1 Filed at: 11/26/2023 1433   Have you been hospitalized for one or more nights during the past 6 months? 1 Filed at: 11/26/2023 1433   In general, do you see well? 0 Filed at: 11/26/2023 1433   In general, do you have serious problems with your memory? 0 Filed at: 11/26/2023 1433   Do you take more than three different medications every day?  1 Filed at: 11/26/2023 1433   ISAR Score 4 Filed at: 11/26/2023 1433           Stroke Assessment       Row Name 11/26/23 1538             NIH Stroke Scale    Interval Baseline      Level of Consciousness (1a.) 0      LOC Questions (1b.) 0      LOC Commands (1c.) 0      Best Gaze (2.) 0      Visual (3.) 0      Facial Palsy (4.) 1      Motor Arm, Left (5a.) 0      Motor Arm, Right (5b.) 0      Motor Leg, Left (6a.) 1      Motor Leg, Right (6b.) 0      Limb Ataxia (7.) 0      Sensory (8.) 0      Best Language (9.) 0      Dysarthria (10.) 0      Extinction and Inattention (11.) (Formerly Neglect) 0      Total 2                    Flowsheet Row Most Recent Value   Thrombolytic Decision Options    Thrombolytic Decision Patient not a candidate. SBIRT 20yo+      Flowsheet Row Most Recent Value   Initial Alcohol Screen: US AUDIT-C     1. How often do you have a drink containing alcohol? 0 Filed at: 11/26/2023 1434   2. How many drinks containing alcohol do you have on a typical day you are drinking? 0 Filed at: 11/26/2023 1434   3b. FEMALE Any Age, or MALE 65+: How often do you have 4 or more drinks on one occassion? 0 Filed at: 11/26/2023 1434   Audit-C Score 0 Filed at: 11/26/2023 1434   DUNCAN: How many times in the past year have you. .. Used an illegal drug or used a prescription medication for non-medical reasons? Never Filed at: 11/26/2023 1434                  Medical Decision Making  42-year-old female patient presenting with left-sided facial numbness and left-sided arm numbness. On exam, patient has left-sided facial droop and 3 out of 5 strength on left lower extremity. Patient is not on any blood thinners. Also has back pain. DDx includes dissection, stroke, UTI, ACS. Stroke alert was called. CTA head and neck showed no acute findings. Labs show hyponatremia and hyperkalemia and SADAF. Patient was treated with fluids. Patient states back pain has resolved. Decision was made not to start TNK due to age and low stroke score. Admitted to medicine for stroke pathway. Amount and/or Complexity of Data Reviewed  Labs: ordered. Radiology: ordered. Risk  OTC drugs. Prescription drug management. Decision regarding hospitalization.           Disposition  Final diagnoses:   Stroke-like symptoms   SADAF (acute kidney injury) ([address])   Hyponatremia   Hyperkalemia     Time reflects when diagnosis was documented in both MDM as applicable and the Disposition within this note       Time User Action Codes Description Comment    11/26/2023  1:32 PM Annie Colindres Add [R29.90] Stroke-like symptoms     11/26/2023  3:23 PM Francia Cox Add [N17.9] SADAF (acute kidney injury) ([address])     11/26/2023  3:23 PM Vanda Duane [facility] Add [E87.1] Hyponatremia     11/26/2023  3:23 PM Rustam Larose Add [E87.5] Hyperkalemia           ED Disposition       ED Disposition   Admit    Condition   Stable    Date/Time   Sun Nov 26, 2023 1233    Comment   Case was discussed with Dr. Jovany Stern and the patient's admission status was agreed to be Admission Status: inpatient status to the service of Dr. Jovany Stern . Follow-up Information    None         Patient's Medications   Discharge Prescriptions    No medications on file     No discharge procedures on file. PDMP Review         Value Time User    PDMP Reviewed  Yes 11/26/2023  3:43 PM Hieu Rocha MD             ED Provider  Attending physically available and evaluated Og Correa. I managed the patient along with the ED Attending.     Electronically Signed by           Rustam Larose MD  11/26/23 3627

## 2023-11-26 NOTE — ASSESSMENT & PLAN NOTE
Will allow for permissive hypertension for first 24 hours  Patient previously takes metoprolol 50 mg mg twice daily, will restart tomorrow

## 2023-11-26 NOTE — ASSESSMENT & PLAN NOTE
78-year-old female with prior strokes on aspirin at baseline, anxiety, arthritis, asthma, diabetes, hypothyroidism, fibromyalgia, hyperlipidemia, hypertension, spinal cord stimulator placement, who presented as a prehospital stroke alert on 11/26 for left jaw numbness, left arm numbness and left facial droop. BP on presentation 125/64. NIHSS 2-left facial droop and left lower extremity drift. Patient was deemed not a thrombolytic candidate due to unclear last known well time and nondisabling symptoms. CT head: No acute intracranial abnormality. Chronic microangiopathic ischemic changes. CTA head and neck:  1. CTA head: Stable moderate stenosis at the distal right MCA M1 segment compared to the prior 10/17/2023 examination. Unchanged focal moderate stenosis involving the inferior basilar artery with infundibular dilatation at the right AICA origin. 2. CTA neck: Moderate right extracranial ICA stenosis. The cervical vertebral arteries are patent. Labs on presentation: sodium 128, creatinine 1.58  2D echo on 11/12/23 with EF 70%, mildly dilated left atrium, normal-sized right atrium, mild MAC. LDL 42, A1C 6.5    Unclear if patient had new stroke vs. recrudescence related to metabolic derangements. Plan:  - Stroke pathway  Unable to complete MRI due to spinal cord stimulator  Repeat CTH on 11/27 pending  Can defer echo as this was just performed   S/P Plavix 300 mg x1. Continue Plavix 75 mg daily along with ASA 81mg daily x90 days, then d/c Plavix and continue on ASA 81mg daily. Atorvastatin 40 mg  Recommend outpatient Zio patch/loop recorder  Patient should have tremor evaluated/treated as outpatient if persistent once derangements are corrected  Goal normotension  Continue telemetry  PT/OT/ST  Frequent neuro checks. Continue to monitor and notify neurology with any changes. - Medical management and supportive care per primary team. Correction of any metabolic or infectious disturbances.

## 2023-11-26 NOTE — ASSESSMENT & PLAN NOTE
Hemoglobin today is 9.7 which appears to be around patient's baseline  Continue iron supplementation  Continue to monitor  Transfuse below 7

## 2023-11-26 NOTE — ASSESSMENT & PLAN NOTE
Patient presented as a prehospital stroke alert for left jaw numbness, left arm numbness, left facial droop  Patient's NIHSS score was a 2  Patient did not have a clear last known well time and had no disabling symptoms and thus was not a candidate for TNK  CT head was done and showed no acute intracranial abnormality  CTA was done and showed no acute abnormality  Patient is not an MRI candidate due to a spinal cord stimulator  Will repeat CT head tomorrow  Follow-up with echo  Follow-up with labs including lipid panel as well as A1c  Patient was loaded with Plavix we will continue Plavix at 75 mg tomorrow  Continue aspirin 81 mg daily  Continue high intensity statin, atorvastatin 40 mg  Will allow for permissive hypertension, maintain systolic blood pressure below 200  Monitor patient with telemetry  Physical therapy-Occupational Therapy-speech therapy evaluate patient  Required neurochecks  Patient will require swallow evaluation

## 2023-11-26 NOTE — H&P
[facility] [facility]  H&P  Name: Mira Sheppard 78 y.o. female I MRN: [id number]  Unit/Bed#: ED 25 I Date of Admission: 11/26/2023   Date of Service: 11/26/2023 I Hospital Day: 0      Assessment/Plan   * Stroke-like symptoms  Assessment & Plan  Patient presented as a prehospital stroke alert for left jaw numbness, left arm numbness, left facial droop  Patient's NIHSS score was a 2  Patient did not have a clear last known well time and had no disabling symptoms and thus was not a candidate for TNK  CT head was done and showed no acute intracranial abnormality  CTA was done and showed no acute abnormality  Patient is not an MRI candidate due to a spinal cord stimulator  Will repeat CT head tomorrow  Follow-up with echo  Follow-up with labs including lipid panel as well as A1c  Patient was loaded with Plavix we will continue Plavix at 75 mg tomorrow  Continue aspirin 81 mg daily  Continue high intensity statin, atorvastatin 40 mg  Will allow for permissive hypertension, maintain systolic blood pressure below 200  Monitor patient with telemetry  Physical therapy-Occupational Therapy-speech therapy evaluate patient  Required neurochecks  Patient will require swallow evaluation    Chronic obstructive pulmonary disease, unspecified COPD type ([address])  Assessment & Plan  Currently patient is not in any respiratory exacerbation  Albuterol as needed    Continuous opioid dependence ([address])  Assessment & Plan  Management as per chronic pain disorder  Will monitor patient for opioid toxicity    Ambulatory dysfunction  Assessment & Plan  Will have occupational therapy physical therapy evaluate patient  Will place fall precautions    Anemia  Assessment & Plan  Hemoglobin today is 9.7 which appears to be around patient's baseline  Continue iron supplementation  Continue to monitor  Transfuse below 7    Hypothyroidism  Assessment & Plan  Recheck TSH  Continue levothyroxine 37.5 mg daily, titrate as needed    Hypercholesterolemia  Assessment & Plan  Continue Lipitor 40 mg daily  Continue Colesytramine 4 g 3 times daily with meals    Anxiety  Assessment & Plan  At baseline  Continue Lexapro    Iron deficiency anemia secondary to inadequate dietary iron intake  Assessment & Plan  Continue iron supplementation    Hypertension  Assessment & Plan  Will allow for permissive hypertension for first 24 hours  Patient previously takes metoprolol 50 mg mg twice daily, will restart tomorrow    Chronic pain disorder  Assessment & Plan  Patient is on Percocet 10-3 25 every 12 hours as needed pain ( Pt usually takes it once per day if needed)   PDMP was reviewed  Continue    Type 2 diabetes mellitus ([address])  Assessment & Plan  Lab Results   Component Value Date    HGBA1C 6.6 (H) 06/09/2023       Recent Labs     11/26/23  1409   POCGLU 88       Blood Sugar Average: Last 72 hrs:  (P) 88    Well-controlled as A1c is 6.6, patient takes metformin 1000 mg twice daily as an outpatient  Will hold metformin while she is in the hospital  Will place patient on insulin sliding scale             VTE Pharmacologic Prophylaxis: VTE Score: 10 Moderate Risk (Score 3-4) - Pharmacological DVT Prophylaxis Ordered: heparin. Code Status: Prior Full code  Discussion with family:  Patient updated family. Anticipated Length of Stay: Patient will be admitted on an inpatient basis with an anticipated length of stay of greater than 2 midnights secondary to stroke workup. Total Time Spent on Date of Encounter in care of patient: 45 mins. This time was spent on one or more of the following: performing physical exam; counseling and coordination of care; obtaining or reviewing history; documenting in the medical record; reviewing/ordering tests, medications or procedures; communicating with other healthcare professionals and discussing with patient's family/caregivers. Chief Complaint: Strokelike symptoms    History of Present Illness:   Edi Mckeon Costa Lazaro is a 78 y.o. female with a PMH of prior stroke on aspirin, anxiety, osteoarthritis, diabetes mellitus, hypertension, hyperlipidemia who presents with who presented with left-sided numbness, left-sided leg weakness starting this morning at approximately 8 AM.  Patient also stated that she had chest pain radiating to left shoulder blade. In the emergency department patient had a CT head that did not show any acute bleed. Patient also had a CTA head and did not show any acute pathology. Patient also had a CTA chest due to her chest pain radiating to her left shoulder blade to rule out aortic dissection. No aortic dissection noted on imaging. Will be unable to obtain MRI as patient has a stimulator. Pt states her  CP as resolved. She continues to feel left sided weakness as well as a left sided UE tremors. Review of Systems:  Review of Systems   Constitutional:  Negative for chills and fever. HENT:  Negative for ear pain and sore throat. Eyes:  Negative for pain and visual disturbance. Respiratory:  Negative for cough and shortness of breath. Cardiovascular:  Negative for chest pain and palpitations. Gastrointestinal:  Negative for abdominal pain and vomiting. Genitourinary:  Negative for dysuria and hematuria. Musculoskeletal:  Negative for arthralgias and back pain. Skin:  Negative for color change and rash. Neurological:  Positive for tremors, facial asymmetry, weakness and numbness. Negative for seizures and syncope. All other systems reviewed and are negative.       Past Medical and Surgical History:   Past Medical History:   Diagnosis Date    Acid reflux     Anemia     hx of iron-deficient    Anxiety     Arthritis     Asthma     last needed inhaler last year 2020    Basal cell carcinoma     upper lip    Chronic narcotic dependence (HCC)     Chronic pain     Colon polyp     Cystocele     Diabetes mellitus ([address])     stable    Disease of thyroid gland     hypothyroidism Diverticulosis     Dizziness     at times    Dysfunctional uterine bleeding     last assessed - [id number]    Dysphagia     Fibromyalgia     Gastric ulcer     Gastroparesis     History of colonic polyps     last assessed - [id number]    History of gastroesophageal reflux (GERD)     Hypercholesterolemia     Hyperlipidemia     Hypertension     IBS (irritable bowel syndrome)     Pneumobilia 06/18/2022    Post laminectomy syndrome     S/P insertion of spinal cord stimulator 07/18/2018    Seasonal allergies     Shortness of breath     exertional    Spinal stenosis     Status post lumbar spinal fusion 03/16/2018    Stroke [facility])     pt states slight stroke March 2022       Past Surgical History:   Procedure Laterality Date    APPENDECTOMY      BACK SURGERY      BREAST CYST EXCISION Left     CARDIAC CATHETERIZATION  11/14/2023    Procedure: Cardiac catheterization;  Surgeon: Soha Brock MD;  [facility]; Service: Cardiology    CARDIAC CATHETERIZATION N/A 11/14/2023    Procedure: Cardiac Coronary Angiogram;  Surgeon: Soha Brock MD;  [facility]; Service: Cardiology    CHOLECYSTECTOMY      COLONOSCOPY      ESOPHAGOGASTRODUODENOSCOPY N/A 09/28/2016    Procedure: ESOPHAGOGASTRODUODENOSCOPY (EGD); Surgeon: Candelario Underwood MD;  Location: [facility] GI LAB; Service:     HERNIA REPAIR      HYSTERECTOMY      TTAH-BSO age 25    LAMINECTOMY      LUMBAR LAMINECTOMY      OOPHORECTOMY      age 25    NJ ARTHRODESIS POSTERIOR/PSTLAT TQ 1NTRSPC THORACIC N/A 06/04/2018    Procedure: Reopening of lumbar incision for T12-L5 posterior instrumented fixation and fusion and T12-L4 posterior decompression;  Surgeon: Carmencita Koyanagi, MD;  Location: [facility];  Service: Neurosurgery    NJ COLONOSCOPY FLX DX W/COLLJ SPEC WHEN PFRMD N/A 03/02/2016    Procedure: EGD AND COLONOSCOPY;  Surgeon: Candelario Underwood MD;  Location: [facility];   Service: Gastroenterology    NJ DILATION ESOPH UNGUIDED SOUND/BOUGIE 1/MULT PASS N/A 09/28/2016    Procedure: DILATATION ESOPHAGEAL;  Surgeon: Augustus Garcia MD;  Location: [facility]; Service: Gastroenterology    AL ESOPHAGOGASTRODUODENOSCOPY TRANSORAL DIAGNOSTIC N/A 07/18/2016    Procedure: ESOPHAGOGASTRODUODENOSCOPY (EGD); Surgeon: Augustus Garcia MD;  Location: [facility]; Service: Gastroenterology    AL INSJ/RPLCMT SPI NPGR DIR/INDUXIVE COUPLING Left 06/04/2018    Procedure: removal of left buttock implantable pulse generator and placement of new  implantable pulse generator;  Surgeon: Rebecca Sandoval MD;  Location: [facility];  Service: Neurosurgery       Meds/Allergies:  Prior to Admission medications    Medication Sig Start Date End Date Taking? Authorizing Provider   albuterol (PROVENTIL HFA,VENTOLIN HFA) 90 mcg/act inhaler Inhale 2 puffs every 6 (six) hours as needed for wheezing or shortness of breath 6/29/22   NANY Mead   aspirin 81 mg chewable tablet Chew 1 tablet (81 mg total) daily 8/9/22 8/26/23  NANY Mead   atorvastatin (LIPITOR) 40 mg tablet TAKE ONE TABLET BY MOUTH ONCE DAILY 11/22/22   NANY Mead   baclofen 10 mg tablet TAKE ONE TABLET BY MOUTH THREE TIMES DAILY AS NEEDED FOR MUSCLE SPASM 5/15/23   NANY Mead   Blood Glucose Monitoring Suppl (OneTouch Verio Reflect) w/Device KIT Check blood sugars twice daily. Please substitute with appropriate alternative as covered by patient's insurance.  Dx: E11.65 2/20/23   NANY Mead   cholestyramine Li Holes) 4 g packet Take 1 packet (4 g total) by mouth 3 (three) times a day with meals  Patient not taking: Reported on 11/10/2023 10/27/22   Cedrick Baires DO   escitalopram (Lexapro) 20 mg tablet Take 0.5 tablets (10 mg total) by mouth daily  Patient not taking: Reported on 11/10/2023 6/29/22   NANY Mead   ferrous sulfate 325 (65 Fe) mg tablet Take 1 tablet (325 mg total) by mouth daily with breakfast Do not start before November 16, 2023. 11/16/23   Scar Duong MD gabapentin (NEURONTIN) 300 mg capsule Take 1 capsule (300 mg total) by mouth daily at bedtime 11/15/23   Ángel Galindo MD   glucose blood (OneTouch Verio) test strip Check blood sugars twice daily. Please substitute with appropriate alternative as covered by patient's insurance. Dx: E11.65 2/20/23   NANY Zhu   hyoscyamine (ANASPAZ,LEVSIN) 0.125 MG tablet Take 1 tablet (0.125 mg total) by mouth every 4 (four) hours as needed for cramping  Patient not taking: Reported on 11/10/2023 5/26/23   NANY Zhu   latanoprost (XALATAN) 0.005 % ophthalmic solution Administer 1 drop to both eyes daily at bedtime 10/2/20 11/10/23  Cyrus Baires, DO   levothyroxine 25 mcg tablet Take 1.5 tablets (37.5 mcg total) by mouth daily 8/9/22 11/10/23  NANY Zhu   meclizine (ANTIVERT) 25 mg tablet Take 1 tablet (25 mg total) by mouth 4 (four) times a day as needed for dizziness 3/26/23 8/26/23  Cyrus Baires, DO   metFORMIN (GLUCOPHAGE) 1000 MG tablet Take 1 tablet (1,000 mg total) by mouth 2 (two) times a day with meals 2/10/23   NANY Zhu   metoclopramide (REGLAN) 10 mg tablet Take 1 tablet (10 mg total) by mouth 4 (four) times a day as needed (Nausea and vomiting)  Patient not taking: Reported on 11/10/2023 9/9/22   NANY Zhu   metoprolol succinate (TOPROL-XL) 50 mg 24 hr tablet Take 1 tablet (50 mg total) by mouth every 12 (twelve) hours 11/15/23   Ángel Galindo MD   Milnacipran HCl (Savella) 100 MG TABS Take 1 tablet (100 mg total) by mouth daily 3/5/23   Cyrus Baires, DO   nystatin (MYCOSTATIN) powder Apply topically 2 (two) times a day  Patient not taking: Reported on 8/26/2023 6/21/22   Delman Cassis, MD   OneTouch Delica Lancets 66Q MISC Check blood sugars twice daily. Please substitute with appropriate alternative as covered by patient's insurance.  Dx: E11.65 2/20/23   NANY Zhu   oxyCODONE-acetaminophen (PERCOCET)  mg per tablet Take 1 tablet by mouth every 6 (six) hours as needed for severe pain Max Daily Amount: 4 tablets  Patient taking differently: Take 1 tablet by mouth every 12 (twelve) hours as needed for severe pain 5/24/23   NANY Leach   pantoprazole (PROTONIX) 40 mg tablet Take 1 tablet (40 mg total) by mouth every 12 (twelve) hours  Patient taking differently: Take 40 mg by mouth daily 7/18/22 11/10/23  Reese Peña MD   ranolazine (RANEXA) 500 mg 12 hr tablet Take 1 tablet (500 mg total) by mouth every 12 (twelve) hours 11/15/23   Will Quintanilla MD   saccharomyces boulardii (FLORASTOR) 250 mg capsule Take 1 capsule (250 mg total) by mouth 2 (two) times a day  Patient not taking: Reported on 11/10/2023 6/21/22   Chapis Santiago MD   trospium (SANCTURA XR) 60 mg 24 hr capsule Take 1 capsule (60 mg total) by mouth daily before breakfast 4/3/23   NANY Leach   sucralfate (CARAFATE) 1 g tablet Take 1 tablet (1 g total) by mouth 4 (four) times a day 7/18/22 10/13/22  Ofelia Suero MD     I have reviewed home medications with patient personally. Allergies: Allergies   Allergen Reactions    Penicillins Anaphylaxis, Hives and Other (See Comments)     Other reaction(s): Unknown Reaction    Sulfa Antibiotics Anaphylaxis and Other (See Comments)     Other reaction(s): Unknown Reaction    Aspartame - Food Allergy Other (See Comments) and Hypertension     Slurred speech, weakness, stroke sx    Iodinated Contrast Media Hives     Pt has taken prep prior for contrast and has not had any break through reaction    Keflex [Cephalexin] Hives       Social History:  Marital Status:     Occupation: Retired  Patient Pre-hospital Living Situation: Apartment  Patient Pre-hospital Level of Mobility: walks with walker  Patient Pre-hospital Diet Restrictions: None  Substance Use History:   Social History     Substance and Sexual Activity   Alcohol Use Not Currently    Comment: Denied history of alcohol use     Social History     Tobacco Use   Smoking Status Former    Types: Cigarettes    Quit date: 1970    Years since quittin.9   Smokeless Tobacco Never   Tobacco Comments    Denied history of current ever day smoker, Former smoker and Never smoker all documented in [employer]     Social History     Substance and Sexual Activity   Drug Use No    Comment: Denied history of drug use       Family History:  Family History   Problem Relation Age of Onset    Lung cancer Mother 55    Pulmonary embolism Father     No Known Problems Sister     No Known Problems Daughter     No Known Problems Daughter     Stroke Maternal Grandmother     Heart attack Maternal Grandfather     No Known Problems Paternal Grandmother     No Known Problems Paternal Grandfather     No Known Problems Maternal Aunt     Diabetes Family         Diabetes mellitus    Hypertension Family     Stroke Family         Stroke complications       Physical Exam:     Vitals:   Blood Pressure: 115/57 (23 1507)  Pulse: 72 (23 1507)  Temperature: (!) 97.3 °F (36.3 °C) (23 1337)  Temp Source: Tympanic (23 1337)  Respirations: 15 (23 1507)  Height: 5' (152.4 cm) (23 1418)  Weight - Scale: 77.6 kg (171 lb 1.2 oz) (23 1418)  SpO2: 96 % (23 1507)    Physical Exam  Vitals reviewed. Constitutional:       General: She is not in acute distress. Appearance: Normal appearance. She is not ill-appearing. HENT:      Head: Normocephalic and atraumatic. Right Ear: External ear normal.      Left Ear: External ear normal.      Nose: Nose normal.   Eyes:      Extraocular Movements: Extraocular movements intact. Conjunctiva/sclera: Conjunctivae normal.   Cardiovascular:      Rate and Rhythm: Normal rate and regular rhythm. Pulses: Normal pulses. Heart sounds: Normal heart sounds. Pulmonary:      Effort: Pulmonary effort is normal.      Breath sounds: Normal breath sounds. Abdominal:      General: Abdomen is flat. Bowel sounds are normal.      Palpations: Abdomen is soft. Musculoskeletal:         General: No deformity or signs of injury. Cervical back: Normal range of motion. Skin:     General: Skin is warm and dry. Neurological:      General: No focal deficit present. Mental Status: She is alert. Mental status is at baseline. Motor: Weakness present. Psychiatric:         Mood and Affect: Mood normal.         Behavior: Behavior normal.          Additional Data:     Lab Results:  Results from last 7 days   Lab Units 11/26/23  1355   WBC Thousand/uL 9.92   HEMOGLOBIN g/dL 9.7*   HEMATOCRIT % 30.2*   PLATELETS Thousands/uL 303     Results from last 7 days   Lab Units 11/26/23  1355   SODIUM mmol/L 128*   POTASSIUM mmol/L 5.9*   CHLORIDE mmol/L 94*   CO2 mmol/L 24   BUN mg/dL 32*   CREATININE mg/dL 1.58*   ANION GAP mmol/L 10   CALCIUM mg/dL 8.8   GLUCOSE RANDOM mg/dL 89     Results from last 7 days   Lab Units 11/26/23  1355   INR  0.96     Results from last 7 days   Lab Units 11/26/23  1409   POC GLUCOSE mg/dl 88     Results from last 7 days   Lab Units 11/26/23  1355   HEMOGLOBIN A1C % 6.5*           Lines/Drains:  Invasive Devices       Peripheral Intravenous Line  Duration             Peripheral IV 11/18/23 Right Antecubital 8 days    Peripheral IV 11/26/23 Right Antecubital <1 day    Peripheral IV 11/26/23 Right Hand <1 day                        Imaging: Reviewed radiology reports from this admission including: CT head  CTA dissection protocol chest/abdomen/pelvis   Final Result by Ten Chamorro MD (11/26 7354)      1. No acute aortic dissection. 2.  Moderate fecal stasis               Workstation performed: [id number]         CTA stroke alert (head/neck)   Final Result by Arnulfo Franks MD (11/26 9872)   1. CTA head: Stable moderate stenosis at the distal right MCA M1 segment compared to the prior 10/17/2023 examination.  Unchanged focal moderate stenosis involving the inferior basilar artery with infundibular dilatation at the right AICA origin. 2. CTA neck: Moderate right extracranial ICA stenosis. The cervical vertebral arteries are patent. Findings were directly discussed with Dr. Ender Melgoza at 2:40 p.m. Workstation performed: [id number]         CT stroke alert brain   Final Result by Sylvie Manriquez MD (11/26 1443)      No acute intracranial abnormality. Chronic microangiopathic ischemic changes. Findings were directly discussed with Dr. Ender Melgoza at 2:40 p.m. Workstation performed: [id number]         CT head wo contrast    (Results Pending)       EKG and Other Studies Reviewed on Admission:   EKG: NSR. HR 72.     ** Please Note: This note has been constructed using a voice recognition system.  **

## 2023-11-26 NOTE — ASSESSMENT & PLAN NOTE
Lab Results   Component Value Date    HGBA1C 6.6 (H) 06/09/2023       Recent Labs     11/26/23  1409   POCGLU 88       Blood Sugar Average: Last 72 hrs:  (P) 88    Well-controlled as A1c is 6.6, patient takes metformin 1000 mg twice daily as an outpatient  Will hold metformin while she is in the hospital  Will place patient on insulin sliding scale

## 2023-11-26 NOTE — CONSULTS
Consultation - Stroke   Kristal Pappas 78 y.o. female MRN: [id number]  Unit/Bed#: ED 25 Encounter: [id number]      Assessment/Plan     Stroke-like symptoms  Assessment & Plan  78-year-old female with prior strokes on aspirin, anxiety, arthritis, asthma, diabetes, hypothyroidism, fibromyalgia, hyperlipidemia, hypertension, spinal cord stimulator placement, who presented as a prehospital stroke alert on 11/26 for left jaw numbness, left arm numbness and left facial droop. BP on presentation 125/64. NIHSS 2-left facial droop and left lower extremity drift. Patient was deemed not a thrombolytic candidate due to unclear last known well time and nondisabling symptoms. CT head: No acute intracranial abnormality. Chronic microangiopathic ischemic changes. CTA head and neck:  1. CTA head: Stable moderate stenosis at the distal right MCA M1 segment compared to the prior 10/17/2023 examination. Unchanged focal moderate stenosis involving the inferior basilar artery with infundibular dilatation at the right AICA origin. 2. CTA neck: Moderate right extracranial ICA stenosis. The cervical vertebral arteries are patent. Labs: sodium 128, creatinine 1.58    Plan:  - Stroke pathway  Unable to complete MRI due to spinal cord stimulator  Repeat CTH tomorrow  Echo  Lipid Panel  Hemoglobin A1c  Give Plavix 300 mg now, then start Plavix 75 mg tomorrow AM  Aspirin 81 mg   Atorvastatin 40 mg  Permissive HTN, labetalol if SBP >200  Continue telemetry  PT/OT/ST  Frequent neuro checks. Continue to monitor and notify neurology with any changes. - Medical management and supportive care per primary team. Correction of any metabolic or infectious disturbances. Thrombolytic Decision: Patient not a candidate. Unclear time of onset outside appropriate time window. and Symptoms resolved/clearly non disabling. Recommendations for outpatient neurological follow up have yet to be determined.     Case and treatment plan reviewed with attending neurologist, Dr. Sheba Sanabria. Please see attending attestation for any further recommendations. History of Present Illness     Reason for Consult / Principal Problem: Strokelike symptoms  Patient last known well: Unclear-possibly 8 AM  Stroke alert called: Prehospital 1:31 PM  Neurology time of arrival: 1:35 PM  HPI: Sal Salter is a 78 y.o. right handed female with prior strokes on aspirin, anxiety, arthritis, asthma, diabetes, hypothyroidism, fibromyalgia, hyperlipidemia, hypertension, spinal cord stimulator placement, who presents with strokelike symptoms. Patient presented to the ED as a prehospital stroke alert for left jaw numbness, left arm numbness, and left facial droop. Patient presents from a nursing facility. Per EMS, patient's last known normal was 1.5 hours ago and approximately 40 minutes ago, patient also reported having pain between her shoulder blades. Attempted to contact nursing facility to clarify last known well time and unfortunately was unable to speak with RN who was taking care of patient. Did speak with another RN at nursing facility who noted that she helped patient's RN to put patient on the commode at approximately 10:45 AM this morning and patient did seem like her normal self. According to RNs review of nursing facility documentation, patient's RN documented that patient reported that she had left-sided numbness at approximately 11:45 AM.  Patient seen and evaluated at the bedside with attending neurologist.  Patient reports to attending neurologist that she felt lower extremity weakness at approximately 8 AM.  Of note, patient recently started antibiotic therapy a few days ago for UTI. Patient was evaluated by neurology in 04/2022 for strokelike symptoms of right facial numbness and dysarthria. Workup at that time included CT head which demonstrated chronic right basal ganglia and left subinsular cortex hypodensities.   Patient is unable to complete MRI due to spinal cord stimulator. She was continued on DAPT for 3 weeks and then transition to aspirin monotherapy. Consult to Neurology  Consult performed by: NANY Durán  Consult ordered by: Marisol Figueroa DO          Review of Systems  A 12 system ROS was completed. Other than the above mentioned complaints in the HPI and those commented on below, all remaining systems were negative. Historical Information   Past Medical History:   Diagnosis Date    Acid reflux     Anemia     hx of iron-deficient    Anxiety     Arthritis     Asthma     last needed inhaler last year 2020    Basal cell carcinoma     upper lip    Chronic narcotic dependence (HCC)     Chronic pain     Colon polyp     Cystocele     Diabetes mellitus ([address])     stable    Disease of thyroid gland     hypothyroidism    Diverticulosis     Dizziness     at times    Dysfunctional uterine bleeding     last assessed - [id number]    Dysphagia     Fibromyalgia     Gastric ulcer     Gastroparesis     History of colonic polyps     last assessed - [id number]    History of gastroesophageal reflux (GERD)     Hypercholesterolemia     Hyperlipidemia     Hypertension     IBS (irritable bowel syndrome)     Pneumobilia 06/18/2022    Post laminectomy syndrome     S/P insertion of spinal cord stimulator 07/18/2018    Seasonal allergies     Shortness of breath     exertional    Spinal stenosis     Status post lumbar spinal fusion 03/16/2018    Stroke [facility])     pt states slight stroke March 2022     Past Surgical History:   Procedure Laterality Date    APPENDECTOMY      BACK SURGERY      BREAST CYST EXCISION Left     CARDIAC CATHETERIZATION  11/14/2023    Procedure: Cardiac catheterization;  Surgeon: Irving Byrne MD;  [facility]; Service: Cardiology    CARDIAC CATHETERIZATION N/A 11/14/2023    Procedure: Cardiac Coronary Angiogram;  Surgeon: Irving Byrne MD;  [facility];   Service: Cardiology    CHOLECYSTECTOMY COLONOSCOPY      ESOPHAGOGASTRODUODENOSCOPY N/A 2016    Procedure: ESOPHAGOGASTRODUODENOSCOPY (EGD); Surgeon: Milvia Riley MD;  Location: [facility]; Service:     HERNIA REPAIR      HYSTERECTOMY      TTAH-BSO age 25    LAMINECTOMY      LUMBAR LAMINECTOMY      OOPHORECTOMY      age 25    AR ARTHRODESIS POSTERIOR/PSTLAT TQ 1NTRSPC THORACIC N/A 2018    Procedure: Reopening of lumbar incision for T12-L5 posterior instrumented fixation and fusion and T12-L4 posterior decompression;  Surgeon: Dulce Dukes MD;  Location: [facility];  Service: Neurosurgery    AR COLONOSCOPY FLX DX W/COLLJ SPEC WHEN PFRMD N/A 2016    Procedure: EGD AND COLONOSCOPY;  Surgeon: Milvia Riley MD;  Location: [facility]; Service: Gastroenterology    AR DILATION [address] [facility] UNGUIDED SOUND/BOUGIE 1/MULT PASS N/A 2016    Procedure: DILATATION ESOPHAGEAL;  Surgeon: Milvia Riley MD;  [facility]: [facility]; Service: Gastroenterology    AR ESOPHAGOGASTRODUODENOSCOPY TRANSORAL DIAGNOSTIC N/A 2016    Procedure: ESOPHAGOGASTRODUODENOSCOPY (EGD); Surgeon: Milvia Riley MD;  Location: [facility];   Service: Gastroenterology    AR INSJ/RPLCMT SPI NPGR DIR/INDUXIVE COUPLING Left 2018    Procedure: removal of left buttock implantable pulse generator and placement of new  implantable pulse generator;  Surgeon: Dulce Dukes MD;  [facility];  Service: Neurosurgery     Social History   Social History     Substance and Sexual Activity   Alcohol Use Not Currently    Comment: Denied history of alcohol use     Social History     Substance and Sexual Activity   Drug Use No    Comment: Denied history of drug use     E-Cigarette/Vaping    E-Cigarette Use Never User      E-Cigarette/Vaping Substances    Nicotine No     THC No     CBD No     Flavoring No     Other No     Unknown No      Social History     Tobacco Use   Smoking Status Former    Types: Cigarettes    Quit date: 1970    Years since quittin.9   Smokeless Tobacco Never   Tobacco Comments    Denied history of current ever day smoker, Former smoker and Never smoker all documented in [employer]     Family History:   Family History   Problem Relation Age of Onset    Lung cancer Mother 55    Pulmonary embolism Father     No Known Problems Sister     No Known Problems Daughter     No Known Problems Daughter     Stroke Maternal Grandmother     Heart attack Maternal Grandfather     No Known Problems Paternal Grandmother     No Known Problems Paternal Grandfather     No Known Problems Maternal Aunt     Diabetes Family         Diabetes mellitus    Hypertension Family     Stroke Family         Stroke complications       Review of previous medical records was completed. Meds/Allergies   all current active meds have been reviewed, current meds:   No current facility-administered medications for this encounter. , and PTA meds:   Prior to Admission Medications   Prescriptions Last Dose Informant Patient Reported? Taking? Blood Glucose Monitoring Suppl (OneTouch Verio Reflect) w/Device KIT   No No   Sig: Check blood sugars twice daily. Please substitute with appropriate alternative as covered by patient's insurance. Dx: E11.65   Milnacipran HCl (Savella) 100 MG TABS   No No   Sig: Take 1 tablet (100 mg total) by mouth daily   OneTouch Delica Lancets 74R MISC   No No   Sig: Check blood sugars twice daily. Please substitute with appropriate alternative as covered by patient's insurance.  Dx: E11.65   albuterol (PROVENTIL HFA,VENTOLIN HFA) 90 mcg/act inhaler  Self No No   Sig: Inhale 2 puffs every 6 (six) hours as needed for wheezing or shortness of breath   aspirin 81 mg chewable tablet  Self No No   Sig: Chew 1 tablet (81 mg total) daily   atorvastatin (LIPITOR) 40 mg tablet   No No   Sig: TAKE ONE TABLET BY MOUTH ONCE DAILY   baclofen 10 mg tablet   No No   Sig: TAKE ONE TABLET BY MOUTH THREE TIMES DAILY AS NEEDED FOR MUSCLE SPASM   cholestyramine (QUESTRAN) 4 g packet   No No Sig: Take 1 packet (4 g total) by mouth 3 (three) times a day with meals   Patient not taking: Reported on 11/10/2023   escitalopram (Lexapro) 20 mg tablet  Self No No   Sig: Take 0.5 tablets (10 mg total) by mouth daily   Patient not taking: Reported on 11/10/2023   ferrous sulfate 325 (65 Fe) mg tablet   No No   Sig: Take 1 tablet (325 mg total) by mouth daily with breakfast Do not start before November 16, 2023. gabapentin (NEURONTIN) 300 mg capsule   No No   Sig: Take 1 capsule (300 mg total) by mouth daily at bedtime   glucose blood (OneTouch Verio) test strip   No No   Sig: Check blood sugars twice daily. Please substitute with appropriate alternative as covered by patient's insurance.  Dx: E11.65   hyoscyamine (ANASPAZ,LEVSIN) 0.125 MG tablet   No No   Sig: Take 1 tablet (0.125 mg total) by mouth every 4 (four) hours as needed for cramping   Patient not taking: Reported on 11/10/2023   latanoprost (XALATAN) 0.005 % ophthalmic solution  Self No No   Sig: Administer 1 drop to both eyes daily at bedtime   levothyroxine 25 mcg tablet  Self No No   Sig: Take 1.5 tablets (37.5 mcg total) by mouth daily   meclizine (ANTIVERT) 25 mg tablet   No No   Sig: Take 1 tablet (25 mg total) by mouth 4 (four) times a day as needed for dizziness   metFORMIN (GLUCOPHAGE) 1000 MG tablet   No No   Sig: Take 1 tablet (1,000 mg total) by mouth 2 (two) times a day with meals   metoclopramide (REGLAN) 10 mg tablet   No No   Sig: Take 1 tablet (10 mg total) by mouth 4 (four) times a day as needed (Nausea and vomiting)   Patient not taking: Reported on 11/10/2023   metoprolol succinate (TOPROL-XL) 50 mg 24 hr tablet   No No   Sig: Take 1 tablet (50 mg total) by mouth every 12 (twelve) hours   nystatin (MYCOSTATIN) powder  Self No No   Sig: Apply topically 2 (two) times a day   Patient not taking: Reported on 8/26/2023   oxyCODONE-acetaminophen (PERCOCET)  mg per tablet   No No   Sig: Take 1 tablet by mouth every 6 (six) hours as needed for severe pain Max Daily Amount: 4 tablets   Patient taking differently: Take 1 tablet by mouth every 12 (twelve) hours as needed for severe pain   pantoprazole (PROTONIX) 40 mg tablet  Self No No   Sig: Take 1 tablet (40 mg total) by mouth every 12 (twelve) hours   Patient taking differently: Take 40 mg by mouth daily   ranolazine (RANEXA) 500 mg 12 hr tablet   No No   Sig: Take 1 tablet (500 mg total) by mouth every 12 (twelve) hours   saccharomyces boulardii (FLORASTOR) 250 mg capsule  Self No No   Sig: Take 1 capsule (250 mg total) by mouth 2 (two) times a day   Patient not taking: Reported on 11/10/2023   trospium (SANCTURA XR) 60 mg 24 hr capsule   No No   Sig: Take 1 capsule (60 mg total) by mouth daily before breakfast      Facility-Administered Medications: None       Allergies   Allergen Reactions    Penicillins Anaphylaxis, Hives and Other (See Comments)     Other reaction(s): Unknown Reaction    Sulfa Antibiotics Anaphylaxis and Other (See Comments)     Other reaction(s): Unknown Reaction    Aspartame - Food Allergy Other (See Comments) and Hypertension     Slurred speech, weakness, stroke sx    Iodinated Contrast Media Hives     Pt has taken prep prior for contrast and has not had any break through reaction    Keflex [Cephalexin] Hives       Objective   Vitals:Blood pressure 136/63, pulse 74, temperature (!) 97.3 °F (36.3 °C), temperature source Tympanic, resp. rate 16, height 5' (1.524 m), weight 77.6 kg (171 lb 1.2 oz), SpO2 96 %, not currently breastfeeding. ,Body mass index is 33.41 kg/m². No intake or output data in the 24 hours ending 11/26/23 1844    Invasive Devices:    Invasive Devices       Peripheral Intravenous Line  Duration             Peripheral IV 11/18/23 Right Antecubital 8 days    Peripheral IV 11/26/23 Right Antecubital <1 day    Peripheral IV 11/26/23 Right Hand <1 day                  Physical and neurologic exam performed by attending neurologist:  Physical Exam  Vitals and nursing note reviewed. Constitutional:       General: She is not in acute distress. Appearance: Normal appearance. She is not ill-appearing. HENT:      Head: Normocephalic. Mouth/Throat:      Mouth: Mucous membranes are moist.      Pharynx: Oropharynx is clear. Eyes:      General: No scleral icterus. Right eye: No discharge. Left eye: No discharge. Extraocular Movements: Extraocular movements intact and EOM normal.      Conjunctiva/sclera: Conjunctivae normal.   Cardiovascular:      Rate and Rhythm: Normal rate. Pulmonary:      Effort: Pulmonary effort is normal. No respiratory distress. Musculoskeletal:         General: Normal range of motion. Cervical back: Normal range of motion. Skin:     General: Skin is warm and dry. Coloration: Skin is not jaundiced or pale. Neurological:      Mental Status: She is alert and oriented to person, place, and time. Coordination: Finger-Nose-Finger Test normal.   Psychiatric:         Mood and Affect: Mood normal.       Neurologic Exam     Mental Status   Oriented to person, place, and time. Level of consciousness: alert  Able to follow commands appropriately. Able to name objects appropriately. No dysarthria noted. Cranial Nerves     CN II   Right visual field deficit: none  Left visual field deficit: none     CN III, IV, VI   Extraocular motions are normal.     CN V   Facial sensation intact.      CN VIII   Hearing: intact    CN IX, X   Palate: symmetric    CN XI   CN XI normal.     CN XII   CN XII normal.   Subtle left facial weakness     Motor Exam   Muscle bulk: normal  Able to raise bilateral upper extremities and hold antigravity x 10 seconds without drift noted  Able to raise bilateral lower extremities and hold antigravity x 5 seconds with some drift noted in left lower extremity     Sensory Exam   Light touch normal.     Gait, Coordination, and Reflexes     Coordination   Finger to nose coordination: normal    Tremor   Resting tremor: absent  Intention tremor: absent      NIHSS:  1a.Level of Consciousness: 0 = Alert   1b. LOC Questions: 0 = Answers both correctly   1c. LOC Commands: 0 = Obeys both correctly   2. Best Gaze: 0 = Normal   3. Visual: 0 = No visual field loss   4. Facial Palsy: 1=Minor paralysis (flattened nasolabial fold, asymmetric on smiling)   5a. Motor Right Arm: 0=No drift, limb holds 90 (or 45) degrees for full 10 seconds   5b. Motor Left Arm: 0=No drift, limb holds 90 (or 45) degrees for full 10 seconds   6a. Motor Right Le=No drift, limb holds 90 (or 45) degrees for full 10 seconds   6b. Motor Left Le=Drift, limb holds 90 (or 45) degrees but drifts down before full 10 seconds: does not hit bed   7. Limb Ataxia:  0=Absent   8. Sensory: 0=Normal; no sensory loss   9. Best Language:  0=No aphasia, normal   10. Dysarthria: 0=Normal articulation   11. Extinction and Inattention (formerly Neglect): 0=No abnormality   Total Score: 0     Time NIHSS was completed: 1:    Modified Liam Score:  Unable to determine currently, will gather additional data    Lab Results: I have personally reviewed pertinent reports.   , CBC:   Results from last 7 days   Lab Units 23  1355   WBC Thousand/uL 9.92   RBC Million/uL 3.35*   HEMOGLOBIN g/dL 9.7*   HEMATOCRIT % 30.2*   MCV fL 90   PLATELETS Thousands/uL 303   , BMP/CMP:   Results from last 7 days   Lab Units 23  1355   SODIUM mmol/L 128*   POTASSIUM mmol/L 5.9*   CHLORIDE mmol/L 94*   CO2 mmol/L 24   BUN mg/dL 32*   CREATININE mg/dL 1.58*   CALCIUM mg/dL 8.8   EGFR ml/min/1.73sq m 30   , Vitamin B12:   , HgBA1C:   , TSH:   , Coagulation:   Results from last 7 days   Lab Units 23  1355   INR  0.96   , Lipid Profile:   , Ammonia:   , Urinalysis:       Invalid input(s): "URIBILINOGEN", Drug Screen:   , Medication Drug Levels:       Invalid input(s): "CARBAMAZEPINE", "LACOSAMIDE", "OXCARBAZEPINE"    Imaging Studies: I have personally reviewed pertinent reports. and I have personally reviewed pertinent films in PACS  EKG, Pathology, and Other Studies: I have personally reviewed pertinent reports.     VTE Prophylaxis: Reason for no pharmacologic prophylaxis patient in ED    Code Status: Prior  Advance Directive and Living Will:      Power of :    POLST:  \

## 2023-11-27 ENCOUNTER — APPOINTMENT (INPATIENT)
Dept: RADIOLOGY | Facility: HOSPITAL | Age: 78
DRG: 683 | End: 2023-11-27
Payer: MEDICARE

## 2023-11-27 LAB
ALBUMIN SERPL BCP-MCNC: 3.1 G/DL (ref 3.5–5)
ALP SERPL-CCNC: 56 U/L (ref 34–104)
ALT SERPL W P-5'-P-CCNC: 9 U/L (ref 7–52)
ANION GAP SERPL CALCULATED.3IONS-SCNC: 10 MMOL/L
AST SERPL W P-5'-P-CCNC: 21 U/L (ref 13–39)
BASOPHILS # BLD AUTO: 0.04 THOUSANDS/ÂΜL (ref 0–0.1)
BASOPHILS NFR BLD AUTO: 1 % (ref 0–1)
BILIRUB SERPL-MCNC: 0.31 MG/DL (ref 0.2–1)
BUN SERPL-MCNC: 31 MG/DL (ref 5–25)
CALCIUM ALBUM COR SERPL-MCNC: 8.3 MG/DL (ref 8.3–10.1)
CALCIUM SERPL-MCNC: 7.6 MG/DL (ref 8.4–10.2)
CHLORIDE SERPL-SCNC: 97 MMOL/L (ref 96–108)
CO2 SERPL-SCNC: 24 MMOL/L (ref 21–32)
CREAT SERPL-MCNC: 1.52 MG/DL (ref 0.6–1.3)
EOSINOPHIL # BLD AUTO: 0.2 THOUSAND/ÂΜL (ref 0–0.61)
EOSINOPHIL NFR BLD AUTO: 3 % (ref 0–6)
ERYTHROCYTE [DISTWIDTH] IN BLOOD BY AUTOMATED COUNT: 14.9 % (ref 11.6–15.1)
GFR SERPL CREATININE-BSD FRML MDRD: 32 ML/MIN/1.73SQ M
GLUCOSE SERPL-MCNC: 138 MG/DL (ref 65–140)
GLUCOSE SERPL-MCNC: 185 MG/DL (ref 65–140)
GLUCOSE SERPL-MCNC: 239 MG/DL (ref 65–140)
GLUCOSE SERPL-MCNC: 72 MG/DL (ref 65–140)
GLUCOSE SERPL-MCNC: 97 MG/DL (ref 65–140)
HCT VFR BLD AUTO: 26.2 % (ref 34.8–46.1)
HGB BLD-MCNC: 8.3 G/DL (ref 11.5–15.4)
IMM GRANULOCYTES # BLD AUTO: 0.03 THOUSAND/UL (ref 0–0.2)
IMM GRANULOCYTES NFR BLD AUTO: 0 % (ref 0–2)
LYMPHOCYTES # BLD AUTO: 1.61 THOUSANDS/ÂΜL (ref 0.6–4.47)
LYMPHOCYTES NFR BLD AUTO: 22 % (ref 14–44)
MCH RBC QN AUTO: 28.7 PG (ref 26.8–34.3)
MCHC RBC AUTO-ENTMCNC: 31.7 G/DL (ref 31.4–37.4)
MCV RBC AUTO: 91 FL (ref 82–98)
MONOCYTES # BLD AUTO: 0.72 THOUSAND/ÂΜL (ref 0.17–1.22)
MONOCYTES NFR BLD AUTO: 10 % (ref 4–12)
NEUTROPHILS # BLD AUTO: 4.66 THOUSANDS/ÂΜL (ref 1.85–7.62)
NEUTS SEG NFR BLD AUTO: 64 % (ref 43–75)
NRBC BLD AUTO-RTO: 0 /100 WBCS
PLATELET # BLD AUTO: 199 THOUSANDS/UL (ref 149–390)
PMV BLD AUTO: 10.7 FL (ref 8.9–12.7)
POTASSIUM SERPL-SCNC: 5 MMOL/L (ref 3.5–5.3)
PROT SERPL-MCNC: 5.2 G/DL (ref 6.4–8.4)
RBC # BLD AUTO: 2.89 MILLION/UL (ref 3.81–5.12)
SODIUM SERPL-SCNC: 131 MMOL/L (ref 135–147)
WBC # BLD AUTO: 7.26 THOUSAND/UL (ref 4.31–10.16)

## 2023-11-27 PROCEDURE — 70450 CT HEAD/BRAIN W/O DYE: CPT

## 2023-11-27 PROCEDURE — 99232 SBSQ HOSP IP/OBS MODERATE 35: CPT | Performed by: NURSE PRACTITIONER

## 2023-11-27 PROCEDURE — 80053 COMPREHEN METABOLIC PANEL: CPT | Performed by: FAMILY MEDICINE

## 2023-11-27 PROCEDURE — 36415 COLL VENOUS BLD VENIPUNCTURE: CPT | Performed by: FAMILY MEDICINE

## 2023-11-27 PROCEDURE — 85025 COMPLETE CBC W/AUTO DIFF WBC: CPT | Performed by: FAMILY MEDICINE

## 2023-11-27 PROCEDURE — 99232 SBSQ HOSP IP/OBS MODERATE 35: CPT | Performed by: PSYCHIATRY & NEUROLOGY

## 2023-11-27 PROCEDURE — 82948 REAGENT STRIP/BLOOD GLUCOSE: CPT

## 2023-11-27 PROCEDURE — G1004 CDSM NDSC: HCPCS

## 2023-11-27 RX ORDER — ATORVASTATIN CALCIUM 40 MG/1
40 TABLET, FILM COATED ORAL
Status: DISCONTINUED | OUTPATIENT
Start: 2023-11-27 | End: 2023-12-01 | Stop reason: HOSPADM

## 2023-11-27 RX ADMIN — PANTOPRAZOLE SODIUM 40 MG: 40 TABLET, DELAYED RELEASE ORAL at 09:37

## 2023-11-27 RX ADMIN — HEPARIN SODIUM 5000 UNITS: 5000 INJECTION INTRAVENOUS; SUBCUTANEOUS at 06:41

## 2023-11-27 RX ADMIN — FERROUS SULFATE TAB 325 MG (65 MG ELEMENTAL FE) 325 MG: 325 (65 FE) TAB at 08:30

## 2023-11-27 RX ADMIN — HEPARIN SODIUM 5000 UNITS: 5000 INJECTION INTRAVENOUS; SUBCUTANEOUS at 15:15

## 2023-11-27 RX ADMIN — METOPROLOL SUCCINATE 50 MG: 50 TABLET, EXTENDED RELEASE ORAL at 22:03

## 2023-11-27 RX ADMIN — HEPARIN SODIUM 5000 UNITS: 5000 INJECTION INTRAVENOUS; SUBCUTANEOUS at 22:04

## 2023-11-27 RX ADMIN — OXYCODONE HYDROCHLORIDE AND ACETAMINOPHEN 2 TABLET: 5; 325 TABLET ORAL at 22:16

## 2023-11-27 RX ADMIN — OXYBUTYNIN 10 MG: 10 TABLET, FILM COATED, EXTENDED RELEASE ORAL at 09:37

## 2023-11-27 RX ADMIN — LEVOTHYROXINE SODIUM 37.5 MCG: 75 TABLET ORAL at 09:37

## 2023-11-27 RX ADMIN — RANOLAZINE 500 MG: 500 TABLET, FILM COATED, EXTENDED RELEASE ORAL at 22:03

## 2023-11-27 RX ADMIN — ATORVASTATIN CALCIUM 40 MG: 40 TABLET, FILM COATED ORAL at 18:10

## 2023-11-27 RX ADMIN — LATANOPROST 1 DROP: 50 SOLUTION OPHTHALMIC at 22:53

## 2023-11-27 NOTE — CASE MANAGEMENT
Case Management Assessment & Discharge Planning Note    Patient name Arnulfo Gutierrez  Location [facility]-74 MRN [id number]  : 1945 Date 2023       Current Admission Date: 2023  Current Admission Diagnosis:Stroke-like symptoms   Patient Active Problem List    Diagnosis Date Noted    Non obstructive CAD 11/15/2023    History of lumbar surgery 11/10/2023    Abnormal urinalysis 2023    Chronic obstructive pulmonary disease, unspecified COPD type ([address]) 2023    Confusion with body shakes and blank stare 2023    Normocytic anemia 2023    Bilateral lower extremity edema 2023    Continuous opioid dependence ([address]) 2022    Cerebrovascular accident (CVA), unspecified mechanism ([address]) 2022    Ambulatory dysfunction 2022    Anemia 2022    Acute kidney injury ([address]) 2022    Stroke-like symptoms 2022    Obesity, morbid ([address]) 2021    Prepyloric ulcer 2021    Diarrhea 2021    Mild intermittent asthma without complication     Iron deficiency anemia secondary to inadequate dietary iron intake 2018    Type 2 diabetes mellitus ([address])     Failed back surgical syndrome 2018    Hypothyroidism 2017    Degenerative lumbar spinal stenosis 2017    Glaucoma 2017    Overactive bladder 2016    Dyspepsia 2016    Hypercholesterolemia 2016    Anxiety 2015    Chronic pain disorder 2013    Hypertension 2013      LOS (days): 1  Geometric Mean LOS (GMLOS) (days):   Days to GMLOS:     OBJECTIVE:  PATIENT READMITTED TO HOSPITAL  Risk of Unplanned Readmission Score: 39.77         Current admission status: Inpatient       Preferred Pharmacy:   [address] [place] [address]  Phone: [phone] Fax: [phone] [address],7Th Floor [address] [place] [address], [address] [place] [address],[address]  [address] [facility] [place] [address] [facility] [address]  Phone: [phone] Fax: [phone]    [address], 1301 [facility] & [place] [address] [facility] [address]  [address] [address]  Phone: [phone] Fax: [phone]    [facility] [address], [address] [facility]  3060 [facility] [address]  Phone: [phone] Fax: [address], 10 42 [facility] [address]  [address]  800 [facility] [address]  Phone: [phone] Fax: [phone]    Primary Care Provider: NANY Leach    Primary Insurance: [facility]  Secondary Insurance:     ASSESSMENT:  [address] [place] [address] Representative - Daughter   Primary Phone: [phone] (Mobile)  Home Phone: [phone] Directives  Does patient have a [address]?: Yes  Does patient have Advance Directives?: Yes  Advance Directives: Living will, Power of  for health care  Primary Contact: daughterTravis    Readmission Root Cause  30 Day Readmission: Yes  Who directed you to return to the hospital?: Other (comment) ([address])  Did you understand whom to contact if you had questions or problems?: Yes  Did you get your prescriptions before you left the hospital?: Yes  Were you able to get your prescriptions filled when you left the hospital?: Yes  Did you take your medications as prescribed?: Yes  Were you able to get to your follow-up appointments?: Yes  During previous admission, was a post-acute recommendation made?: Yes  What post-acute resources were offered?: STR  Patient was readmitted due to: stroke like symptoms  Action Plan: awaiting medical stability/ therapy recs    Patient Information  Admitted from[de-identified] Facility ([address])  Mental Status: Alert  During Assessment patient was accompanied by: Not accompanied during assessment  Assessment information provided by[de-identified] Patient  Primary Caregiver: Self  Support Systems: Self, Family members  [place] of Residence: [facility] do you live in?: Health system entry access options.  Select all that apply.: Stairs  Number of steps to enter home.: 4  Type of Current Residence: Apartment  Floor Level: 1  Upon entering residence, is there a bedroom on the main floor (no further steps)?: Yes  Upon entering residence, is there a bathroom on the main floor (no further steps)?: Yes  Living Arrangements: Lives w/ Son  Is patient a ?: No    Activities of Daily Living Prior to Admission  Functional Status: Assistance  Completes ADLs independently?: No  Level of ADL dependence: Assistance  Does patient use assisted devices?: Yes  Assisted Devices (DME) used: Rollator, Shower Chair, Straight Rodell   Does patient have a history of Outpatient Therapy (PT/OT)?: Yes  Does the patient have a history of Short-Term Rehab?: Yes ([address])  Does patient have a history of HHC?: Yes ([facility])  Does patient currently have [facility]?: Yes    Current Home Health Care  Type of Current Home Care Services: Home health aide  Current Home Health Agency[de-identified] Other (please enter name in comment) ([facility])  [address] Provider[de-identified] PCP    Patient Information Continued  Income Source: Pension/assisted  Does patient have prescription coverage?: Yes  Does patient receive dialysis treatments?: Yes  Does patient have a history of substance abuse?: No  Does patient have a history of Mental Health Diagnosis?: No    Means of Transportation  Means of Transport to Appts[de-identified] Family transport      Housing Stability: Low Risk  (11/12/2023)    Housing Stability Vital Sign     Unable to Pay for Housing in the Last Year: No     Number of [address] in the Last Year: 1     Unstable Housing in the Last Year: No   Food Insecurity: No Food Insecurity (11/12/2023)    Hunger Vital Sign     Worried About Running Out of Food in the Last Year: Never true     Ran Out of Food in the Last Year: Never true   Transportation Needs: No Transportation Needs (11/12/2023)    PRAPARE - Transportation     Lack of Transportation (Medical): No     Lack of Transportation (Non-Medical): No   Utilities: Not on file       DISCHARGE DETAILS:    Discharge planning discussed with[de-identified] Patient  Freedom of Choice: Yes  Comments - Freedom of Choice: Discussed FOC  CM contacted family/caregiver?: No- see comments (Declined)     Other Referral/Resources/Interventions Provided:  Referral Comments: Patient was recently at [facility], would like referral to [facility], entered in [place]    CM reviewed d/c planning process including the following: identifying help at home, patient preference for d/c planning needs, Discharge Lounge, [facility] Meds to Bed program, availability of treatment team to discuss questions or concerns patient and/or family may have regarding understanding medications and recognizing signs and symptoms once discharged. CM also encouraged patient to follow up with all recommended appointments after discharge. Patient advised of importance for patient and family to participate in managing patient’s medical well being. Information obtained from patient, lives in 1st floor apartment with son, 4 ELLYN. Was IADLS prior to previous admission, now requires assistance with ADLS. Has rollator (uses all the time), walker, cane, and SC at home.   Daughter Daria Varela is patient's medical POA

## 2023-11-27 NOTE — ASSESSMENT & PLAN NOTE
Lab Results   Component Value Date    HGBA1C 6.5 (H) 11/26/2023       Recent Labs     11/26/23  1717 11/26/23  2242 11/27/23  0826 11/27/23  1142   POCGLU 82 79 97 138         Blood Sugar Average: Last 72 hrs:  (P) 96.8    Well-controlled as A1c is 6.6, patient takes metformin 1000 mg twice daily as an outpatient  Will hold metformin while she is in the hospital  Will place patient on insulin sliding scale  Diabetic diet

## 2023-11-27 NOTE — PROGRESS NOTES
[facility]  Progress Note  Name: Sal Salter I  MRN: [id number]  Unit/Bed#: [facility] 212-48 I Date of Admission: 11/26/2023   Date of Service: 11/27/2023 I Hospital Day: 1    Assessment/Plan   * Stroke-like symptoms  Assessment & Plan  Patient presented as a prehospital stroke alert for left jaw numbness, left arm numbness, left facial droop  Patient's NIHSS score was a 2  Patient did not have a clear last known well time and had no disabling symptoms and thus was not a candidate for TNK  CT head was done and showed no acute intracranial abnormality  CTA was done and showed no acute abnormality  Patient is not an MRI candidate due to a spinal cord stimulator  Will repeat CT; pending   Patient had recent echocardiogram 11/12/2023 with EF 70%  lipid panel shows triglycerides elevated 179, HDL 42, LDL 42  Recommend heart healthy diet, increased exercise and weight loss   Hemoglobin A1c seven 6.5  Patient was loaded with Plavix we will continue Plavix at 75 mg tomorrow  Continue aspirin 81 mg daily  Continue high intensity statin, atorvastatin 40 mg  Will allow for permissive hypertension, maintain systolic blood pressure below 200  Monitor patient with telemetry  Physical therapy-Occupational Therapy-speech therapy evaluate patient  Required neurochecks  Patient will require swallow evaluation    Ambulatory dysfunction  Assessment & Plan  Will have occupational therapy physical therapy evaluate patient  Fall precautions    Type 2 diabetes mellitus ([address])  Assessment & Plan  Lab Results   Component Value Date    HGBA1C 6.5 (H) 11/26/2023       Recent Labs     11/26/23  1717 11/26/23  2242 11/27/23  0826 11/27/23  1142   POCGLU 82 79 97 138         Blood Sugar Average: Last 72 hrs:  (P) 96.8    Well-controlled as A1c is 6.6, patient takes metformin 1000 mg twice daily as an outpatient  Will hold metformin while she is in the hospital  Will place patient on insulin sliding scale  Diabetic diet     Anemia  Assessment & Plan  Hemoglobin today is 8.3   Continue iron supplementation  Continue to monitor  Transfuse below 7    Chronic obstructive pulmonary disease, unspecified COPD type ([address])  Assessment & Plan  Currently patient is not in any respiratory exacerbation  Albuterol as needed    Hypertension  Assessment & Plan  Will allow for permissive hypertension for first 24 hours  Patient previously takes metoprolol 50 mg mg twice daily, resumed     Iron deficiency anemia secondary to inadequate dietary iron intake  Assessment & Plan  Continue iron supplementation  Monitor hgb     Hypercholesterolemia  Assessment & Plan  Continue Lipitor 40 mg daily  Patient takes Colesytramine 4 g 3 times daily with meals; non formulary; ask patient to bring in her med from home (we only have the sugar free and she is allergic to aspartamine)  Heart healthy diet     Chronic pain disorder  Assessment & Plan  Patient is on Percocet 10-3 25 every 12 hours as needed pain ( Pt usually takes it once per day if needed)   PDMP was reviewed  Continue    Continuous opioid dependence ([address])  Assessment & Plan  Management as per chronic pain disorder  Will monitor patient for opioid toxicity    Hypothyroidism  Assessment & Plan  TSH 3.889  Continue levothyroxine 37.5 mg daily  Follow up outpatient PCP     Anxiety  Assessment & Plan  At baseline  Continue Lexapro  Supportive care     Overactive bladder  Assessment & Plan  Continue home medication of oxybutynin 10 mg               VTE Pharmacologic Prophylaxis: VTE Score: 10 High Risk (Score >/= 5) - Pharmacological DVT Prophylaxis Ordered: heparin. Sequential Compression Devices Ordered. Mobility:   Basic Mobility Inpatient Raw Score: 13  JH-HLM Goal: 4: Move to chair/commode  JH-HLM Achieved: 5: Stand (1 or more minutes)  HLM Goal achieved. Continue to encourage appropriate mobility. Patient Centered Rounds: I performed bedside rounds with nursing staff today. Discussions with Specialists or Other Care Team Provider: case management     Education and Discussions with Family / Patient: Patient declined call to . Total Time Spent on Date of Encounter in care of patient: greater than 45 mins. This time was spent on one or more of the following: performing physical exam; counseling and coordination of care; obtaining or reviewing history; documenting in the medical record; reviewing/ordering tests, medications or procedures; communicating with other healthcare professionals and discussing with patient's family/caregivers. Current Length of Stay: 1 day(s)  Current Patient Status: Inpatient   Certification Statement: The patient will continue to require additional inpatient hospital stay due to PT and OT evaluation and treatment; follow up on recommendations   Discharge Plan: Anticipate discharge in 24-48 hrs to discharge location to be determined pending rehab evaluations. Code Status: Level 1 - Full Code    Subjective:   Patient seen sitting up in bed resting comfortably. Watching TV. Denies any further numbness tingling in her left hand and forearm, no numbness and tingling in her jaw. Denies any this time. No deficits noted. No nausea or vomiting, no dizziness or headache. Objective:     Vitals:   No data recorded. HR:  [68-78] 75  Resp:  [15-19] 16  BP: (103-146)/(52-67) 103/61  SpO2:  [93 %-100 %] 93 %  Body mass index is 33.41 kg/m². Input and Output Summary (last 24 hours): Intake/Output Summary (Last 24 hours) at 11/27/2023 1339  Last data filed at 11/27/2023 0475  Gross per 24 hour   Intake 1000 ml   Output 1000 ml   Net 0 ml       Physical Exam:   Physical Exam  Vitals and nursing note reviewed. Constitutional:       General: She is not in acute distress. Appearance: She is obese. HENT:      Head: Normocephalic.       Nose: Nose normal.      Mouth/Throat:      Mouth: Mucous membranes are moist.   Eyes: Extraocular Movements: Extraocular movements intact. Conjunctiva/sclera: Conjunctivae normal.      Pupils: Pupils are equal, round, and reactive to light. Cardiovascular:      Rate and Rhythm: Normal rate and regular rhythm. Pulses: Normal pulses. Heart sounds: Normal heart sounds. Pulmonary:      Effort: Pulmonary effort is normal.      Breath sounds: Normal breath sounds. Abdominal:      General: Bowel sounds are normal.      Palpations: Abdomen is soft. Genitourinary:     Comments: Voiding spontaneously  Musculoskeletal:         General: Normal range of motion. Cervical back: Normal range of motion. Right lower leg: No edema. Left lower leg: No edema. Skin:     General: Skin is warm and dry. Capillary Refill: Capillary refill takes less than 2 seconds. Neurological:      General: No focal deficit present. Mental Status: She is alert and oriented to person, place, and time. Psychiatric:         Mood and Affect: Mood normal.         Behavior: Behavior normal.         Thought Content:  Thought content normal.         Judgment: Judgment normal.          Additional Data:     Labs:  Results from last 7 days   Lab Units 11/27/23  0413   WBC Thousand/uL 7.26   HEMOGLOBIN g/dL 8.3*   HEMATOCRIT % 26.2*   PLATELETS Thousands/uL 199   NEUTROS PCT % 64   LYMPHS PCT % 22   MONOS PCT % 10   EOS PCT % 3     Results from last 7 days   Lab Units 11/27/23  0413   SODIUM mmol/L 131*   POTASSIUM mmol/L 5.0   CHLORIDE mmol/L 97   CO2 mmol/L 24   BUN mg/dL 31*   CREATININE mg/dL 1.52*   ANION GAP mmol/L 10   CALCIUM mg/dL 7.6*   ALBUMIN g/dL 3.1*   TOTAL BILIRUBIN mg/dL 0.31   ALK PHOS U/L 56   ALT U/L 9   AST U/L 21   GLUCOSE RANDOM mg/dL 72     Results from last 7 days   Lab Units 11/26/23  1355   INR  0.96     Results from last 7 days   Lab Units 11/27/23  1142 11/27/23  0826 11/26/23  2242 11/26/23  1717 11/26/23  1409   POC GLUCOSE mg/dl 138 97 79 82 88     Results from last 7 days   Lab Units 11/26/23  1355   HEMOGLOBIN A1C % 6.5*           Lines/Drains:  Invasive Devices       Peripheral Intravenous Line  Duration             Peripheral IV 11/18/23 Right Antecubital 8 days    Peripheral IV 11/26/23 Right Antecubital 1 day    Peripheral IV 11/26/23 Right Hand 1 day                          Imaging: Reviewed radiology reports from this admission including: abdominal/pelvic CT and CT head    Recent Cultures (last 7 days):         Last 24 Hours Medication List:   Current Facility-Administered Medications   Medication Dose Route Frequency Provider Last Rate    albuterol  2 puff Inhalation Q6H PRN Hieu Rocha MD      atorvastatin  40 mg Oral Daily With [address]      ferrous sulfate  325 mg Oral Daily With Breakfast Hieu Rocha MD      heparin (porcine)  5,000 Units Subcutaneous [facility] Hieu Rocha MD      insulin lispro  1-5 Units Subcutaneous TID AC Hieu Rocha MD      latanoprost  1 drop Both Eyes HS Hieu Rocha MD      levothyroxine  37.5 mcg Oral Daily Hieu Rocha MD      metoprolol succinate  50 mg Oral Q12H [facility] Hieu Rocha MD      Milnacipran HCl  1 tablet Oral Daily Hieu Rocha MD      oxybutynin  10 mg Oral Daily Hieu Rocha MD      oxyCODONE-acetaminophen  2 tablet Oral Q12H PRN Hieu Rocha MD      pantoprazole  40 mg Oral Daily Hieu Rocha MD      ranolazine  500 mg Oral Q12H Alfa Ta MD          Today, Patient Was Seen By: NANY Alonzo    **Please Note: This note may have been constructed using a voice recognition system. **

## 2023-11-27 NOTE — ASSESSMENT & PLAN NOTE
Continue Lipitor 40 mg daily  Patient takes Colesytramine 4 g 3 times daily with meals; non formulary; ask patient to bring in her med from home (we only have the sugar free and she is allergic to aspartamine)  Heart healthy diet

## 2023-11-27 NOTE — PROGRESS NOTES
Progress Note - Neurology   Elan Richard 78 y.o. female MRN: [id number]  Unit/Bed#: [facility] 623-01 Encounter: [id number]      Assessment/Plan     * Stroke-like symptoms  Assessment & Plan  77-year-old female with prior strokes on aspirin at baseline, anxiety, arthritis, asthma, diabetes, hypothyroidism, fibromyalgia, hyperlipidemia, hypertension, spinal cord stimulator placement, who presented as a prehospital stroke alert on 11/26 for left jaw numbness, left arm numbness and left facial droop. BP on presentation 125/64. NIHSS 2-left facial droop and left lower extremity drift. Patient was deemed not a thrombolytic candidate due to unclear last known well time and nondisabling symptoms. CT head: No acute intracranial abnormality. Chronic microangiopathic ischemic changes. CTA head and neck:  1. CTA head: Stable moderate stenosis at the distal right MCA M1 segment compared to the prior 10/17/2023 examination. Unchanged focal moderate stenosis involving the inferior basilar artery with infundibular dilatation at the right AICA origin. 2. CTA neck: Moderate right extracranial ICA stenosis. The cervical vertebral arteries are patent. Labs on presentation: sodium 128, creatinine 1.58  2D echo on 11/12/23 with EF 70%, mildly dilated left atrium, normal-sized right atrium, mild MAC. LDL 42, A1C 6.5    Unclear if patient had new stroke vs. recrudescence related to metabolic derangements. Plan:  - Stroke pathway  Unable to complete MRI due to spinal cord stimulator  Repeat CTH on 11/27 pending  Can defer echo as this was just performed   S/P Plavix 300 mg x1. Continue Plavix 75 mg daily along with ASA 81mg daily x90 days, then d/c Plavix and continue on ASA 81mg daily. Atorvastatin 40 mg  Recommend outpatient Zio patch/loop recorder  Patient should have tremor evaluated/treated as outpatient if persistent once derangements are corrected  Goal normotension  Continue telemetry  PT/OT/ST  Frequent neuro checks. Continue to monitor and notify neurology with any changes. - Medical management and supportive care per primary team. Correction of any metabolic or infectious disturbances. Recommendations for outpatient neurological follow up have yet to be determined. Subjective:   Patient reports ongoing concern with the "giving out" and tremulousness of her LUE and LLE, only apparent when ambulating. She states this has been going on for about 3 weeks. A family member at bedside states her face looks "crooked" with drooping on the left. Patient also notes h/o skin cancer removal in the nasolabial fold. When asked to describe her presenting symptoms, she states she was having numbness of her face (points to her chin, crossing midline) and LUE. This seems to have resolved. Another family member says the patient has been told to hydrate better in the past, but she refuses and she does not wish to frequently get up to use the restroom. Patient notes her sister has a head tremor when asked about FH of tremor. ROS: 12 point review of systems performed and negative except as indicated above. Vitals: Blood pressure 103/61, pulse 75, temperature (!) 97.3 °F (36.3 °C), temperature source Tympanic, resp. rate 16, height 5' (1.524 m), weight 77.6 kg (171 lb 1.2 oz), SpO2 93 %, not currently breastfeeding. ,Body mass index is 33.41 kg/m². Physical Exam:   General:  Patient is well-developed, obese BMI 33.41, and in no acute distress. HEENT:  Head normocephalic. Eyes anicteric. Cardiovascular:  With regular rhythm. Lungs:  Normal effort. Nonlabored breathing. Extremities:  With no significant edema. Skin: No rashes. Neurologic:  Patient is alert, pleasantly interactive, and appropriately conversational.  No obvious symbolic language difficulty or dysarthria, and the patient is fully oriented. Gait deferred for safety. Cranial Nerves:   II: Visual fields full to confrontation.  Pupils equal, round, reactive to light with normal accomodation. Cannot visualize optic fundi. III,IV,VI: extraocular movements intact with no nystagmus. V: Sensation in the V1 through V3 distributions intact to light touch bilaterally. VII: Left lower facial asymmetry noted, with scarring in left nasolabial fold and left side of mouth slightly downturned as compared to right. XII: Tongue midline with no atrophy or fasciculations with appropriate movement. Coordination:  Accurate with finger-to-nose maneuvers bilaterally. Motor testing with no upper or lower extremity drift. Bilateral should abduction 4/5; bilateral biceps 4+/5. LE strength 5/5. Essential-appearing high-frequency, lower amplitude tremor noted in BL UE, with additional subtle high-frequency rest tremor noted left index finger. Tongue tremor noted. Sensory testing grossly intact to light touch throughout.      Lab, Imaging and other studies: CBC:   Results from last 7 days   Lab Units 11/27/23 0413 11/26/23 1951 11/26/23  1355   WBC Thousand/uL 7.26  --  9.92   RBC Million/uL 2.89*  --  3.35*   HEMOGLOBIN g/dL 8.3*  --  9.7*   HEMATOCRIT % 26.2*  --  30.2*   MCV fL 91  --  90   PLATELETS Thousands/uL 199 247 303   , BMP/CMP:   Results from last 7 days   Lab Units 11/27/23 0413 11/26/23  1355   SODIUM mmol/L 131* 128*   POTASSIUM mmol/L 5.0 5.9*   CHLORIDE mmol/L 97 94*   CO2 mmol/L 24 24   BUN mg/dL 31* 32*   CREATININE mg/dL 1.52* 1.58*   CALCIUM mg/dL 7.6* 8.8   AST U/L 21  --    ALT U/L 9  --    ALK PHOS U/L 56  --    EGFR ml/min/1.73sq m 32 30     VTE Prophylaxis: Sequential compression device (Venodyne)     Counseling / Coordination of Care  I have spent a total time of 45 minutes on 11/27/23 in caring for this patient including Diagnostic results, Prognosis, Risks and benefits of tx options, Instructions for management, Patient and family education, Importance of tx compliance, Impressions, Documenting in the medical record, Reviewing / ordering tests, medicine, procedures  , and Obtaining or reviewing history  .

## 2023-11-27 NOTE — ASSESSMENT & PLAN NOTE
Patient presented as a prehospital stroke alert for left jaw numbness, left arm numbness, left facial droop  Patient's NIHSS score was a 2  Patient did not have a clear last known well time and had no disabling symptoms and thus was not a candidate for TNK  CT head was done and showed no acute intracranial abnormality  CTA was done and showed no acute abnormality  Patient is not an MRI candidate due to a spinal cord stimulator  Will repeat CT; pending   Patient had recent echocardiogram 11/12/2023 with EF 70%  lipid panel shows triglycerides elevated 179, HDL 42, LDL 42  Recommend heart healthy diet, increased exercise and weight loss   Hemoglobin A1c seven 6.5  Patient was loaded with Plavix we will continue Plavix at 75 mg tomorrow  Continue aspirin 81 mg daily  Continue high intensity statin, atorvastatin 40 mg  Will allow for permissive hypertension, maintain systolic blood pressure below 200  Monitor patient with telemetry  Physical therapy-Occupational Therapy-speech therapy evaluate patient  Required neurochecks  Patient will require swallow evaluation

## 2023-11-27 NOTE — ASSESSMENT & PLAN NOTE
Will allow for permissive hypertension for first 24 hours  Patient previously takes metoprolol 50 mg mg twice daily, resumed

## 2023-11-27 NOTE — UTILIZATION REVIEW
Initial Clinical Review    Admission: Date/Time/Statement:   Admission Orders (From admission, onward)       Ordered        11/26/23 1523  INPATIENT ADMISSION  Once                          Orders Placed This Encounter   Procedures    INPATIENT ADMISSION     Standing Status:   Standing     Number of Occurrences:   1     Order Specific Question:   Level of Care     Answer:   Med Surg [16]     Order Specific Question:   Estimated length of stay     Answer:   More than 2 Midnights     Order Specific Question:   Certification     Answer:   I certify that inpatient services are medically necessary for this patient for a duration of greater than two midnights. See H&P and MD Progress Notes for additional information about the patient's course of treatment. ED Arrival Information       Expected   -    Arrival   11/26/2023 13:35    Acuity   Urgent              Means of arrival   Ambulance    Escorted by   [facility]    Service   Hospitalist    Admission type   Emergency              Arrival complaint   Stroke Alert             Chief Complaint   Patient presents with    STROKE Alert       Initial Presentation: 78 y.o. female with PMHx prior stroke on aspirin, anxiety, osteoarthritis, DM, HTN, HLD presents to ED by ems with left jaw numbness, left arm numbness, left facial droop. On presentation NIHSS score 2. Not a candidate for TNK as no clear last known well time and had no disabling symptoms. CTH with no acute abnl, CTA no acute abnl. Unable to do MRI as pt has a stimulator. H/H 9.7/30.2. Na 128, K 5.9, CL 94, BUN 32, Cr 1.58. Plavix load given. Admitted inpatient to MS unit with Stork-like symptoms -- Echo, lipid panel, A1c ordered. Continue plavix 75 mg daily. Allow for permissive HTN, maintain SBP <200. Neuro checks q 4h. Hgb ~baseline. Continue iron suppl, transfuse if <7. Continue pta po meds. Accuchecks w/ ssi. PT/OT evals.  Swallow eval.     Date: 11/27   Day 2: Pt reports ongoing concern with the "giving out" and tremulousness of her LUE and LLE, only apparent when ambulating. She states this has been going on for ~3 wks. A family member at bedside states her face looks "crooked" with drooping on the left. Pt also notes h/o skin cancer removal in the nasolabial fold. When asked to describe her presenting symptoms, she states she was having numbness of her face (points to her chin, crossing midline) and LUE. This seems to have resolved. Another family member says the patient has been told to hydrate better in the past, but she refuses and she does not wish to frequently get up to use the restroom. Pt notes her sister has a head tremor when asked about FH of tremor. B/l should abduction 4/5; bilateral biceps 4+/5. LE strength 5/5. Essential-appearing high-frequency, lower amplitude tremor noted in BL UE, with additional subtle high-frequency rest tremor noted left index finger. Tongue tremor noted. CTH repeated today. Continue asa and plavix.        ED Triage Vitals   Temperature Pulse Respirations Blood Pressure SpO2   11/26/23 1337 11/26/23 1337 11/26/23 1337 11/26/23 1337 11/26/23 1333   (!) 97.3 °F (36.3 °C) 83 18 125/64 98 %      Temp Source Heart Rate Source Patient Position - Orthostatic VS BP Location FiO2 (%)   11/26/23 1337 11/26/23 1337 11/26/23 1337 11/26/23 1719 --   Tympanic Monitor Lying Right arm       Pain Score       11/26/23 1422       8          Wt Readings from Last 1 Encounters:   11/26/23 77.6 kg (171 lb 1.2 oz)     Additional Vital Signs:   Date/Time Temp Pulse Resp BP MAP (mmHg) SpO2 O2 Device Patient Position - Orthostatic VS   11/27/23 15:48:31 98.2 °F (36.8 °C) 79 16 120/57 78 97 % -- --   11/27/23 14:23:53 97.5 °F (36.4 °C) 81 16 110/58 75 93 % -- --   11/27/23 1000 -- -- -- -- -- -- None (Room air) --   11/27/23 09:33:34 -- 75 -- 103/61 75 93 % -- --   11/27/23 0933 -- 75 -- 103/61 -- -- -- --   11/27/23 0700 -- 78 16 137/60 87 97 % None (Room air) Lying   11/27/23 0500 -- 78 16 144/65 94 96 % None (Room air) Lying   11/27/23 0300 -- 76 16 146/63 90 97 % None (Room air) Lying   11/27/23 0100 -- 74 16 129/60 87 100 % None (Room air) Lying   11/26/23 2245 -- 74 16 126/61 86 97 % None (Room air) Lying   11/26/23 2055 -- 75 -- 133/62 -- -- -- --   11/26/23 2015 -- 76 18 143/67 97 96 % None (Room air) Lying   11/26/23 2000 -- 76 18 136/63 -- 98 % None (Room air) --   11/26/23 1900 -- 75 16 136/62 -- 99 % None (Room air) --   11/26/23 1719 -- 72 15 133/62 89 98 % None (Room air) Lying   11/26/23 1507 -- 72 15 115/57 -- 96 % None (Room air) Lying   11/26/23 1437 -- 74 16 136/63 -- 96 % None (Room air) Lying   11/26/23 1422 -- 74 19 129/59 -- 96 % None (Room air) Lying   11/26/23 1407 -- 72 18 123/52 -- 96 % None (Room air) Lying   11/26/23 1352 -- 68 18 141/64 -- 100 % None (Room air) Lying   11/26/23 13:37:04 97.3 °F (36.3 °C) Abnormal  83 18 125/64 -- 98 % None (Room air) Lying   11/26/23 1333 -- -- -- -- -- 98 % -- --     Pertinent Labs/Diagnostic Test Results:   CT head wo contrast   Final Result by Priya Mendez MD (11/27 3286)      No acute intracranial abnormality. Stable chronic microangiopathic changes within the brain. A few secretions in the left sphenoid sinus which can represent acute sinusitis. Workstation performed: [id number]         CTA dissection protocol chest/abdomen/pelvis   Final Result by Andrés Simms MD (11/26 2078)      1. No acute aortic dissection. 2.  Moderate fecal stasis         CTA stroke alert (head/neck)   Final Result by Arabella Weiner MD (11/26 1442)   1. CTA head: Stable moderate stenosis at the distal right MCA M1 segment compared to the prior 10/17/2023 examination. Unchanged focal moderate stenosis involving the inferior basilar artery with infundibular dilatation at the right AICA origin. 2. CTA neck: Moderate right extracranial ICA stenosis. The cervical vertebral arteries are patent.          CT stroke alert brain Final Result by Sylvie Manriquez MD (11/26 1443)      No acute intracranial abnormality. Chronic microangiopathic ischemic changes.         Results from last 7 days   Lab Units 11/26/23  1355   SARS-COV-2  Negative     Results from last 7 days   Lab Units 11/27/23 0413 11/26/23 1951 11/26/23  1355   WBC Thousand/uL 7.26  --  9.92   HEMOGLOBIN g/dL 8.3*  --  9.7*   HEMATOCRIT % 26.2*  --  30.2*   PLATELETS Thousands/uL 199 247 303   NEUTROS ABS Thousands/µL 4.66  --   --      Results from last 7 days   Lab Units 11/27/23 0413 11/26/23  1355   SODIUM mmol/L 131* 128*   POTASSIUM mmol/L 5.0 5.9*   CHLORIDE mmol/L 97 94*   CO2 mmol/L 24 24   ANION GAP mmol/L 10 10   BUN mg/dL 31* 32*   CREATININE mg/dL 1.52* 1.58*   EGFR ml/min/1.73sq m 32 30   CALCIUM mg/dL 7.6* 8.8     Results from last 7 days   Lab Units 11/27/23  0413   AST U/L 21   ALT U/L 9   ALK PHOS U/L 56   TOTAL PROTEIN g/dL 5.2*   ALBUMIN g/dL 3.1*   TOTAL BILIRUBIN mg/dL 0.31     Results from last 7 days   Lab Units 11/27/23  1142 11/27/23  0826 11/26/23  2242 11/26/23  1717 11/26/23  1409   POC GLUCOSE mg/dl 138 97 79 82 88     Results from last 7 days   Lab Units 11/27/23  0413 11/26/23  1355   GLUCOSE RANDOM mg/dL 72 89     Results from last 7 days   Lab Units 11/26/23  1355   HEMOGLOBIN A1C % 6.5*   EAG mg/dl 140     Results from last 7 days   Lab Units 11/26/23  1812 11/26/23  1604 11/26/23  1355   HS TNI 0HR ng/L  --   --  9   HS TNI 2HR ng/L  --  9  --    HSTNI D2 ng/L  --  0  --    HS TNI 4HR ng/L 10  --   --    HSTNI D4 ng/L 1  --   --      Results from last 7 days   Lab Units 11/26/23  1355   PROTIME seconds 12.7   INR  0.96   PTT seconds 29     Results from last 7 days   Lab Units 11/26/23  1604   TSH 3RD GENERATON uIU/mL 3.899     Results from last 7 days   Lab Units 11/26/23  1355   INFLUENZA A PCR  Negative   INFLUENZA B PCR  Negative   RSV PCR  Negative     ED Treatment:   Medication Administration from 11/26/2023 1330 to 11/27/2023 0859         Date/Time Order Dose Route Action     11/26/2023 1347 EST diphenhydrAMINE (BENADRYL) injection 50 mg 50 mg Intravenous Given     11/26/2023 1402 EST iohexol (OMNIPAQUE) 350 MG/ML injection (MULTI-DOSE) 85 mL 85 mL Intravenous Given     11/26/2023 1425 EST aspirin tablet 325 mg 325 mg Oral Given     11/26/2023 1425 EST clopidogrel (PLAVIX) tablet 300 mg 300 mg Oral Given     11/26/2023 1522 EST sodium chloride 0.9 % bolus 1,000 mL 1,000 mL Intravenous New Bag     11/27/2023 0830 EST ferrous sulfate tablet 325 mg 325 mg Oral Given     11/26/2023 2055 EST metoprolol succinate (TOPROL-XL) 24 hr tablet 50 mg 50 mg Oral Given     11/26/2023 1947 EST oxyCODONE-acetaminophen (PERCOCET) 5-325 mg per tablet 2 tablet 2 tablet Oral Given     11/26/2023 2055 EST ranolazine (RANEXA) 12 hr tablet 500 mg 500 mg Oral Given     11/27/2023 0641 EST heparin (porcine) subcutaneous injection 5,000 Units 5,000 Units Subcutaneous Given     11/26/2023 2241 EST heparin (porcine) subcutaneous injection 5,000 Units 5,000 Units Subcutaneous Given       Past Medical History:   Diagnosis Date    Acid reflux     Anemia     hx of iron-deficient    Anxiety     Arthritis     Asthma     last needed inhaler last year 2020    Basal cell carcinoma     upper lip    Chronic narcotic dependence (HCC)     Chronic pain     Colon polyp     Cystocele     Diabetes mellitus ([address])     stable    Disease of thyroid gland     hypothyroidism    Diverticulosis     Dizziness     at times    Dysfunctional uterine bleeding     last assessed - [id number]    Dysphagia     Fibromyalgia     Gastric ulcer     Gastroparesis     History of colonic polyps     last assessed - [id number]    History of gastroesophageal reflux (GERD)     Hypercholesterolemia     Hyperlipidemia     Hypertension     IBS (irritable bowel syndrome)     Pneumobilia 06/18/2022    Post laminectomy syndrome     S/P insertion of spinal cord stimulator 07/18/2018    Seasonal allergies Shortness of breath     exertional    Spinal stenosis     Status post lumbar spinal fusion 03/16/2018    Stroke [facility])     pt states slight stroke March 2022     Present on Admission:   Stroke-like symptoms   Ambulatory dysfunction   Anemia   Anxiety   Chronic obstructive pulmonary disease, unspecified COPD type ([address] )   Chronic pain disorder   Continuous opioid dependence ([address])   Hypertension   Hypothyroidism   Hypercholesterolemia   Iron deficiency anemia secondary to inadequate dietary iron intake   Overactive bladder   Type 2 diabetes mellitus (HCC)      Admitting Diagnosis: Hyperkalemia [E87.5]  Hyponatremia [E87.1]  CVA (cerebral vascular accident) ([address]) [I63.9]  SADAF (acute kidney injury) ([address] [facility]) [N17.9]  Stroke-like symptoms [R29.90]  Age/Sex: 78 y.o. female  Admission Orders:  Scheduled Medications:  atorvastatin, 40 mg, Oral, Daily With Dinner  ferrous sulfate, 325 mg, Oral, Daily With Breakfast  heparin (porcine), 5,000 Units, Subcutaneous, Q8H [address]  insulin lispro, 1-5 Units, Subcutaneous, TID AC  latanoprost, 1 drop, Both Eyes, HS  levothyroxine, 37.5 mcg, Oral, Daily  metoprolol succinate, 50 mg, Oral, Q12H ARY  Milnacipran HCl, 1 tablet, Oral, Daily  oxybutynin, 10 mg, Oral, Daily  pantoprazole, 40 mg, Oral, Daily  ranolazine, 500 mg, Oral, Q12H [address]    PRN Meds:  albuterol, 2 puff, Inhalation, Q6H PRN  oxyCODONE-acetaminophen, 2 tablet, Oral, Q12H PRN 11/26 x1            Network Utilization Review Department  ATTENTION: Please call with any questions or concerns to [phone] and carefully listen to the prompts so that you are directed to the right person. All voicemails are confidential.   For Discharge needs, contact Care Management DC Support Team at [phone] opt. 2  Send all requests for admission clinical reviews, approved or denied determinations and any other requests to dedicated fax number below belonging to the campus where the patient is receiving treatment.  List of dedicated fax numbers for the Facilities:  FACILITY NAME UR FAX NUMBER   ADMISSION DENIALS (Administrative/Medical Necessity) [phone]   DISCHARGE SUPPORT TEAM (NETWORK) [address] [address] [facility] (Maternity/NICU/Pediatrics) [address] 1000 [facility] [phone] 1505 [facility] 207 [facility] 5220 [facility] [address] [address] [facility] [address] [phone]

## 2023-11-28 PROBLEM — N17.9 AKI (ACUTE KIDNEY INJURY) (HCC): Status: ACTIVE | Noted: 2023-11-28

## 2023-11-28 PROBLEM — R06.02 EXERTIONAL SHORTNESS OF BREATH: Status: ACTIVE | Noted: 2023-11-28

## 2023-11-28 PROBLEM — R25.1 TREMOR: Status: ACTIVE | Noted: 2023-11-28

## 2023-11-28 LAB
ANION GAP SERPL CALCULATED.3IONS-SCNC: 9 MMOL/L
BASOPHILS # BLD AUTO: 0.04 THOUSANDS/ÂΜL (ref 0–0.1)
BASOPHILS NFR BLD AUTO: 1 % (ref 0–1)
BUN SERPL-MCNC: 30 MG/DL (ref 5–25)
CALCIUM SERPL-MCNC: 8.5 MG/DL (ref 8.4–10.2)
CHLORIDE SERPL-SCNC: 96 MMOL/L (ref 96–108)
CO2 SERPL-SCNC: 27 MMOL/L (ref 21–32)
CREAT SERPL-MCNC: 1.66 MG/DL (ref 0.6–1.3)
EOSINOPHIL # BLD AUTO: 0.19 THOUSAND/ÂΜL (ref 0–0.61)
EOSINOPHIL NFR BLD AUTO: 3 % (ref 0–6)
ERYTHROCYTE [DISTWIDTH] IN BLOOD BY AUTOMATED COUNT: 15.2 % (ref 11.6–15.1)
GFR SERPL CREATININE-BSD FRML MDRD: 28 ML/MIN/1.73SQ M
GLUCOSE SERPL-MCNC: 126 MG/DL (ref 65–140)
GLUCOSE SERPL-MCNC: 139 MG/DL (ref 65–140)
GLUCOSE SERPL-MCNC: 184 MG/DL (ref 65–140)
GLUCOSE SERPL-MCNC: 187 MG/DL (ref 65–140)
GLUCOSE SERPL-MCNC: 189 MG/DL (ref 65–140)
HCT VFR BLD AUTO: 26.8 % (ref 34.8–46.1)
HGB BLD-MCNC: 8.7 G/DL (ref 11.5–15.4)
IMM GRANULOCYTES # BLD AUTO: 0.03 THOUSAND/UL (ref 0–0.2)
IMM GRANULOCYTES NFR BLD AUTO: 0 % (ref 0–2)
LYMPHOCYTES # BLD AUTO: 1.34 THOUSANDS/ÂΜL (ref 0.6–4.47)
LYMPHOCYTES NFR BLD AUTO: 19 % (ref 14–44)
MCH RBC QN AUTO: 29.2 PG (ref 26.8–34.3)
MCHC RBC AUTO-ENTMCNC: 32.5 G/DL (ref 31.4–37.4)
MCV RBC AUTO: 90 FL (ref 82–98)
MONOCYTES # BLD AUTO: 0.88 THOUSAND/ÂΜL (ref 0.17–1.22)
MONOCYTES NFR BLD AUTO: 12 % (ref 4–12)
NEUTROPHILS # BLD AUTO: 4.64 THOUSANDS/ÂΜL (ref 1.85–7.62)
NEUTS SEG NFR BLD AUTO: 65 % (ref 43–75)
NRBC BLD AUTO-RTO: 0 /100 WBCS
PLATELET # BLD AUTO: 240 THOUSANDS/UL (ref 149–390)
PMV BLD AUTO: 10.6 FL (ref 8.9–12.7)
POTASSIUM SERPL-SCNC: 4.2 MMOL/L (ref 3.5–5.3)
RBC # BLD AUTO: 2.98 MILLION/UL (ref 3.81–5.12)
SODIUM SERPL-SCNC: 132 MMOL/L (ref 135–147)
WBC # BLD AUTO: 7.12 THOUSAND/UL (ref 4.31–10.16)

## 2023-11-28 PROCEDURE — 99232 SBSQ HOSP IP/OBS MODERATE 35: CPT | Performed by: INTERNAL MEDICINE

## 2023-11-28 PROCEDURE — 82948 REAGENT STRIP/BLOOD GLUCOSE: CPT

## 2023-11-28 PROCEDURE — 85025 COMPLETE CBC W/AUTO DIFF WBC: CPT | Performed by: NURSE PRACTITIONER

## 2023-11-28 PROCEDURE — 80048 BASIC METABOLIC PNL TOTAL CA: CPT | Performed by: NURSE PRACTITIONER

## 2023-11-28 PROCEDURE — 99221 1ST HOSP IP/OBS SF/LOW 40: CPT | Performed by: NURSE PRACTITIONER

## 2023-11-28 RX ORDER — BACLOFEN 10 MG/1
10 TABLET ORAL ONCE AS NEEDED
Status: COMPLETED | OUTPATIENT
Start: 2023-11-28 | End: 2023-11-28

## 2023-11-28 RX ORDER — LIDOCAINE 50 MG/G
1 PATCH TOPICAL DAILY
Status: DISCONTINUED | OUTPATIENT
Start: 2023-11-28 | End: 2023-12-01 | Stop reason: HOSPADM

## 2023-11-28 RX ORDER — SODIUM CHLORIDE 9 MG/ML
100 INJECTION, SOLUTION INTRAVENOUS CONTINUOUS
Status: DISPENSED | OUTPATIENT
Start: 2023-11-28 | End: 2023-11-29

## 2023-11-28 RX ADMIN — BACLOFEN 10 MG: 10 TABLET ORAL at 05:04

## 2023-11-28 RX ADMIN — LIDOCAINE 1 PATCH: 50 PATCH CUTANEOUS at 11:56

## 2023-11-28 RX ADMIN — INSULIN LISPRO 1 UNITS: 100 INJECTION, SOLUTION INTRAVENOUS; SUBCUTANEOUS at 18:09

## 2023-11-28 RX ADMIN — OXYBUTYNIN 10 MG: 10 TABLET, FILM COATED, EXTENDED RELEASE ORAL at 08:08

## 2023-11-28 RX ADMIN — RANOLAZINE 500 MG: 500 TABLET, FILM COATED, EXTENDED RELEASE ORAL at 21:27

## 2023-11-28 RX ADMIN — ATORVASTATIN CALCIUM 40 MG: 40 TABLET, FILM COATED ORAL at 15:31

## 2023-11-28 RX ADMIN — FERROUS SULFATE TAB 325 MG (65 MG ELEMENTAL FE) 325 MG: 325 (65 FE) TAB at 08:07

## 2023-11-28 RX ADMIN — INSULIN LISPRO 1 UNITS: 100 INJECTION, SOLUTION INTRAVENOUS; SUBCUTANEOUS at 12:12

## 2023-11-28 RX ADMIN — METOPROLOL SUCCINATE 50 MG: 50 TABLET, EXTENDED RELEASE ORAL at 08:09

## 2023-11-28 RX ADMIN — RANOLAZINE 500 MG: 500 TABLET, FILM COATED, EXTENDED RELEASE ORAL at 08:08

## 2023-11-28 RX ADMIN — HEPARIN SODIUM 5000 UNITS: 5000 INJECTION INTRAVENOUS; SUBCUTANEOUS at 14:03

## 2023-11-28 RX ADMIN — LATANOPROST 1 DROP: 50 SOLUTION OPHTHALMIC at 21:31

## 2023-11-28 RX ADMIN — LEVOTHYROXINE SODIUM 37.5 MCG: 75 TABLET ORAL at 08:07

## 2023-11-28 RX ADMIN — SODIUM CHLORIDE 100 ML/HR: 0.9 INJECTION, SOLUTION INTRAVENOUS at 12:08

## 2023-11-28 RX ADMIN — HEPARIN SODIUM 5000 UNITS: 5000 INJECTION INTRAVENOUS; SUBCUTANEOUS at 21:29

## 2023-11-28 RX ADMIN — METOPROLOL SUCCINATE 50 MG: 50 TABLET, EXTENDED RELEASE ORAL at 21:27

## 2023-11-28 RX ADMIN — HEPARIN SODIUM 5000 UNITS: 5000 INJECTION INTRAVENOUS; SUBCUTANEOUS at 05:04

## 2023-11-28 RX ADMIN — SODIUM CHLORIDE 100 ML/HR: 0.9 INJECTION, SOLUTION INTRAVENOUS at 21:33

## 2023-11-28 RX ADMIN — PANTOPRAZOLE SODIUM 40 MG: 40 TABLET, DELAYED RELEASE ORAL at 08:08

## 2023-11-28 NOTE — ASSESSMENT & PLAN NOTE
Lab Results   Component Value Date    HGBA1C 6.5 (H) 11/26/2023       Recent Labs     11/27/23  1142 11/27/23  1638 11/27/23 2129 11/28/23  0744   POCGLU 138 239* 185* 126         Blood Sugar Average: Last 72 hrs:  (P) 129.25    Well-controlled as A1c is 6.6, patient takes metformin 1000 mg twice daily as an outpatient  Metformin currently held, on sliding scale insulin

## 2023-11-28 NOTE — ASSESSMENT & PLAN NOTE
Baseline appears between 8 and 9, unclear if this has been worked up in the outpatient setting   continue iron supplementation  Monitor hgb

## 2023-11-28 NOTE — ASSESSMENT & PLAN NOTE
Recently admitted to [facility] for this approx 2 weeks ago. Was seen by cardiology, felt not to be heart failure, more likely hypovolemic given SADAF.   VQ scan was negative, echo reviewed EF 93%, grade 1 diastolic dysfunction and no significant valvular disease  Monitor O2 sats with ambulation

## 2023-11-28 NOTE — ASSESSMENT & PLAN NOTE
Blood pressure relatively well-controlled, continue Toprol-XL 50 mg twice daily, this was recently increased during prior admission

## 2023-11-28 NOTE — ASSESSMENT & PLAN NOTE
POA creatinine 1.58, baseline previously appears to be around 0.8-1.0 however during recent hospital stay did have SADAF with peak of 1.40.   Creat worsened to 1.66 on 11/28  Volume status appears relatively stable however will trial IVF x 12 hours  Avoid hypotension  During recent admission was taken off lisinopril indefinitely  Check bladder scan and follow retention protocol  Consider nephrology consult if worsens tomorrow

## 2023-11-28 NOTE — ASSESSMENT & PLAN NOTE
Noted upon examination  Neurology evaluated, unclear if related to metabolic derangements, however patient reportedly does have a family history of tremor  Neurology is recommending outpatient workup for this

## 2023-11-28 NOTE — PROGRESS NOTES
Patient:  Cornell Carrillo    MRN:  [id number]    Aidin Request ID:  [id number]    Level of care reserved:  [address]    Partner Reserved:  [place] [facility]  Skilled [facility]  , [address] [phone]    Clinical needs requested:    Geography searched:  10 miles around [phone]    Start of Service:    Request sent:  3:10pm EST on 11/27/2023 by [facility]    Partner reserved:  9:26am EST on 11/28/2023 by [facility]    Choice list shared:  9:26am EST on 11/28/2023 by [facility]

## 2023-11-28 NOTE — QUICK NOTE
Sudeepla Mela will need follow up in 6-8 weeks with neurovascular or general attending . She will not require outpatient neurological testing. No further inpatient Neuro recommendations. Statement Selected

## 2023-11-28 NOTE — PROGRESS NOTES
[facility] [facility]  Progress Note  Name: Hermelindo Vu  MRN: [id number]  Unit/Bed#: [facility] 732-62 I Date of Admission: 11/26/2023   Date of Service: 11/28/2023 I Hospital Day: 2    Left VM for daughter  Shreya Michaels with office call back number to give medical update. Assessment/Plan   * Stroke-like symptoms  Assessment & Plan  Presented as a prehospital stroke alert on 11/26 for left jaw numbness, left arm numbness and left facial droop. Patietn NIHSS 2 due to L facial droop and LLE drift. Was not a thrombolytic candidate secondary to last unknown well time. CT head without any acute abnormalities  CTA showed Stable moderate stenosis at the distal right MCA M1 segment compared to the prior 10/17/2023 examination. Unchanged focal moderate stenosis involving the inferior basilar artery with infundibular dilatation at the right AICA origin. 2. CTA neck: Moderate right extracranial ICA stenosis. The cervical vertebral arteries are patent. Seen by neurology, unable to have MRI due to spinal cord stimulator repeat head CT stable  Echo deferred as recently completed  Status post Plavix load x 1, recommendation by neurology to continue Plavix 75 mg daily along with aspirin 81 mg daily for 90 days then DC Plavix and continue on aspirin monotherapy  Continue statin  Needs outpatient Zio patch/loop recorder  Recommending outpatient evaluation of her tremor once metabolic derangements are corrected  Pending PT/OT evaluations     Tremor  Assessment & Plan  Noted upon examination  Neurology evaluated, unclear if related to metabolic derangements, however patient reportedly does have a family history of tremor  Neurology is recommending outpatient workup for this    SADAF (acute kidney injury) ([address])  Assessment & Plan  POA creatinine 1.58, baseline previously appears to be around 0.8-1.0 however during recent hospital stay did have SADAF with peak of 1.40.   Creat worsened to 1.66 on 11/28  Volume status appears relatively stable however will trial IVF x 12 hours  Avoid hypotension  During recent admission was taken off lisinopril indefinitely  Check bladder scan and follow retention protocol  Consider nephrology consult if worsens tomorrow     Exertional shortness of breath  Assessment & Plan  [facility] for this approx 2 weeks ago. Was seen by cardiology, felt not to be heart failure, more likely hypovolemic given SADAF.   VQ scan was negative, echo reviewed EF 19%, grade 1 diastolic dysfunction and no significant valvular disease  Monitor O2 sats with ambulation    Chronic obstructive pulmonary disease, unspecified COPD type ([address])  Assessment & Plan  Currently patient is not in any respiratory exacerbation  Albuterol as needed    Overactive bladder  Assessment & Plan  Continue home medication of oxybutynin 10 mg    Hypothyroidism  Assessment & Plan  TSH 3.889  Continue levothyroxine 37.5 mg daily  Follow up outpatient PCP     Hypercholesterolemia  Assessment & Plan  Continue statin therapy    Anxiety  Assessment & Plan  At baseline  Continue Lexapro  Supportive care     Iron deficiency anemia secondary to inadequate dietary iron intake  Assessment & Plan  Baseline appears between 8 and 9, unclear if this has been worked up in the outpatient setting   continue iron supplementation  Monitor hgb     Hypertension  Assessment & Plan  Blood pressure relatively well-controlled, continue Toprol-XL 50 mg twice daily, this was recently increased during prior admission    Chronic pain disorder  Assessment & Plan  Patient is on Percocet 10-3 25 every 12 hours as needed pain ( Pt usually takes it once per day if needed)   PDMP was reviewed by prior provider    Type 2 diabetes mellitus [facility])  Assessment & Plan  Lab Results   Component Value Date    HGBA1C 6.5 (H) 11/26/2023       Recent Labs     11/27/23  1142 11/27/23  1638 11/27/23 2129 11/28/23  0744   POCGLU 138 239* 185* 126 Blood Sugar Average: Last 72 hrs:  (P) 129.25    Well-controlled as A1c is 6.6, patient takes metformin 1000 mg twice daily as an outpatient  Metformin currently held, on sliding scale insulin             VTE Pharmacologic Prophylaxis: VTE Score: 10 Moderate Risk (Score 3-4) - Pharmacological DVT Prophylaxis Ordered: heparin. Mobility:   Basic Mobility Inpatient Raw Score: 13  -HL Goal: 4: Move to chair/commode  JH-HLM Achieved: 2: Bed activities/Dependent transfer  HLM Goal NOT achieved. Continue with multidisciplinary rounding and encourage appropriate mobility to improve upon [facility] goals. Patient Centered Rounds: I performed bedside rounds with nursing staff today. Discussions with Specialists or Other Care Team Provider: d/w CM    Education and Discussions with Family / Patient: Attempted to update  (daughter) via phone. Left voicemail. Total Time Spent on Date of Encounter in care of patient: 38 mins. This time was spent on one or more of the following: performing physical exam; counseling and coordination of care; obtaining or reviewing history; documenting in the medical record; reviewing/ordering tests, medications or procedures; communicating with other healthcare professionals and discussing with patient's family/caregivers. Current Length of Stay: 2 day(s)  Current Patient Status: Inpatient   Certification Statement: The patient will continue to require additional inpatient hospital stay due to SADAF, PT/OT evaluations  Discharge Plan: Anticipate discharge in 24-48 hrs to discharge location to be determined pending rehab evaluations. Code Status: Level 1 - Full Code    Subjective:   Feels okay. Still states her L arm and L leg are "numb." Has no weakness. Mild headache from being in bed and neck being stiff. Reports she is doing her best to eat and drink but not a big eater at baseline. No other complaints.      Objective:     Vitals:   Temp (24hrs), Av.1 °F (36.7 °C), Min:97.5 °F (36.4 °C), Max:98.5 °F (36.9 °C)    Temp:  [97.5 °F (36.4 °C)-98.5 °F (36.9 °C)] 98.2 °F (36.8 °C)  HR:  [78-82] 80  Resp:  [16-18] 16  BP: (110-149)/(57-80) 127/68  SpO2:  [93 %-98 %] 98 %  Body mass index is 33.41 kg/m². Input and Output Summary (last 24 hours): Intake/Output Summary (Last 24 hours) at 11/28/2023 1127  Last data filed at 11/28/2023 0855  Gross per 24 hour   Intake 600 ml   Output 1590 ml   Net -990 ml       Physical Exam:   Physical Exam  Vitals and nursing note reviewed. Constitutional:       General: She is not in acute distress. Comments: On RA    Cardiovascular:      Rate and Rhythm: Normal rate. Pulmonary:      Effort: No respiratory distress. Breath sounds: Normal breath sounds. No wheezing. Abdominal:      General: Bowel sounds are normal. There is no distension. Palpations: Abdomen is soft. Musculoskeletal:      Right lower leg: No edema. Left lower leg: No edema. Skin:     Coloration: Skin is pale. Neurological:      Mental Status: She is oriented to person, place, and time. Mental status is at baseline.       Comments: Tremor LUE, able to move all extremities, lift both legs off bed    Psychiatric:         Mood and Affect: Mood normal.          Additional Data:     Labs:  Results from last 7 days   Lab Units 11/28/23  0514   WBC Thousand/uL 7.12   HEMOGLOBIN g/dL 8.7*   HEMATOCRIT % 26.8*   PLATELETS Thousands/uL 240   NEUTROS PCT % 65   LYMPHS PCT % 19   MONOS PCT % 12   EOS PCT % 3     Results from last 7 days   Lab Units 11/28/23  0514 11/27/23  0413   SODIUM mmol/L 132* 131*   POTASSIUM mmol/L 4.2 5.0   CHLORIDE mmol/L 96 97   CO2 mmol/L 27 24   BUN mg/dL 30* 31*   CREATININE mg/dL 1.66* 1.52*   ANION GAP mmol/L 9 10   CALCIUM mg/dL 8.5 7.6*   ALBUMIN g/dL  --  3.1*   TOTAL BILIRUBIN mg/dL  --  0.31   ALK PHOS U/L  --  56   ALT U/L  --  9   AST U/L  --  21   GLUCOSE RANDOM mg/dL 139 72     Results from last 7 days   Lab Units 11/26/23  1355   INR  0.96     Results from last 7 days   Lab Units 11/28/23  0744 11/27/23  2129 11/27/23  1638 11/27/23  1142 11/27/23  0826 11/26/23  2242 11/26/23  1717 11/26/23  1409   POC GLUCOSE mg/dl 126 185* 239* 138 97 79 82 88     Results from last 7 days   Lab Units 11/26/23  1355   HEMOGLOBIN A1C % 6.5*           Lines/Drains:  Invasive Devices       Peripheral Intravenous Line  Duration             Peripheral IV 11/18/23 Right Antecubital 9 days    Peripheral IV 11/26/23 Right Antecubital 1 day              Drain  Duration             External Urinary Catheter <1 day                          Imaging: Reviewed radiology reports from this admission including: all     Recent Cultures (last 7 days):         Last 24 Hours Medication List:   Current Facility-Administered Medications   Medication Dose Route Frequency Provider Last Rate    albuterol  2 puff Inhalation Q6H PRN Jennifer Alexis MD      atorvastatin  40 mg Oral Daily With [address], CRNP      ferrous sulfate  325 mg Oral Daily With Breakfast Jennifer Alexis MD      heparin (porcine)  5,000 Units Subcutaneous [facility] Jennifer Alexis MD      insulin lispro  1-5 Units Subcutaneous TID AC Jennifer Alexis MD      latanoprost  1 drop Both Eyes HS Jennifer Alexis MD      levothyroxine  37.5 mcg Oral Daily Jennifer Alexis MD      metoprolol succinate  50 mg Oral Q12H [address] Jennifer Alexis MD      Milnacipran HCl  1 tablet Oral Daily Jennifer Alexis MD      oxybutynin  10 mg Oral Daily Jenniefr Alexis MD      oxyCODONE-acetaminophen  2 tablet Oral Q12H PRN Jennifer Alexis MD      pantoprazole  40 mg Oral Daily Jennifer Alexis MD      ranolazine  500 mg Oral Q12H [address] Jennifer Alexis MD      sodium chloride  100 mL/hr Intravenous Continuous Malaika Yu PA-C          Today, Patient Was Seen By: Malaika Yu PA-C    **Please Note: This note may have been constructed using a voice recognition system. **

## 2023-11-28 NOTE — CONSULTS
Consultation - [address] [facility] 78 y.o. female MRN: [id number]  Unit/Bed#: [facility] 623-01 Encounter: [id number]    Assessment/Plan     Assessment:  Pressure ulcer of sacral region stage I  Controlled type 2 diabetes mellitus    Plan:  1. Sacrum: No open wounds present on sacrum or bilateral buttock. Sacrum reddened but blanchable. Please cleanse sacrum and bilateral buttocks with soap and water, pat dry, apply Hydraguard cream 3 times daily and as needed for prevention  2. Bilateral heels intact and blanchable  3. We will place preventative wound care orders  4. A1C results reviewed with the patient today. Patient's most recent A1c was 6.5 as of 11/26/2023. Blood glucose being managed by primary care team  5. Wound care sign off please reconsult with any new wound care issues    History of Present Illness   HPI:  Slime Vivas is a 78 y.o. female with a past medical history of prior stroke on aspirin, anxiety, osteoarthritis, type 2 diabetes mellitus, HTN, and HLD who was admitted yesterday for left-sided numbness and left leg weakness. Wound care was evaluated for a possible pressure ulcer of her sacrum. Upon assessment, patient had no open wounds present to her sacrum or bilateral buttocks. Consults    Review of Systems   Constitutional: Negative. HENT:  Negative for ear pain and hearing loss. Eyes:  Negative for pain. Respiratory:  Negative for chest tightness and shortness of breath. Cardiovascular:  Negative for chest pain, palpitations and leg swelling. Gastrointestinal:  Negative for diarrhea, nausea and vomiting. Genitourinary:  Negative for dysuria. Musculoskeletal:  Negative for gait problem. Skin:  Negative for wound. Neurological:  Positive for weakness. Negative for tremors. Psychiatric/Behavioral:  Negative for behavioral problems, confusion and suicidal ideas.         Historical Information   Past Medical History:   Diagnosis Date    Acid reflux Anemia     hx of iron-deficient    Anxiety     Arthritis     Asthma     last needed inhaler last year 2020    Basal cell carcinoma     upper lip    Chronic narcotic dependence (HCC)     Chronic pain     Colon polyp     Cystocele     Diabetes mellitus ([address])     stable    Disease of thyroid gland     hypothyroidism    Diverticulosis     Dizziness     at times    Dysfunctional uterine bleeding     last assessed - [id number]    Dysphagia     Fibromyalgia     Gastric ulcer     Gastroparesis     History of colonic polyps     last assessed - [id number]    History of gastroesophageal reflux (GERD)     Hypercholesterolemia     Hyperlipidemia     Hypertension     IBS (irritable bowel syndrome)     Pneumobilia 06/18/2022    Post laminectomy syndrome     S/P insertion of spinal cord stimulator 07/18/2018    Seasonal allergies     Shortness of breath     exertional    Spinal stenosis     Status post lumbar spinal fusion 03/16/2018    Stroke [facility])     pt states slight stroke March 2022     Past Surgical History:   Procedure Laterality Date    APPENDECTOMY      BACK SURGERY      BREAST CYST EXCISION Left     CARDIAC CATHETERIZATION  11/14/2023    Procedure: Cardiac catheterization;  Surgeon: Syeda Cash MD;  [facility]: [facility]; Service: Cardiology    CARDIAC CATHETERIZATION N/A 11/14/2023    Procedure: Cardiac Coronary Angiogram;  Surgeon: Syeda Cash MD;  [facility]: [facility]; Service: Cardiology    CHOLECYSTECTOMY      COLONOSCOPY      ESOPHAGOGASTRODUODENOSCOPY N/A 09/28/2016    Procedure: ESOPHAGOGASTRODUODENOSCOPY (EGD); Surgeon: Claudia Gergg MD;  Location: [facility] GI LAB;   Service:     HERNIA REPAIR      HYSTERECTOMY      TTAH-BSO age 27    LAMINECTOMY      LUMBAR LAMINECTOMY      OOPHORECTOMY      age 25    IL ARTHRODESIS POSTERIOR/PSTLAT TQ 1NTRSPC THORACIC N/A 06/04/2018    Procedure: Reopening of lumbar incision for T12-L5 posterior instrumented fixation and fusion and T12-L4 posterior decompression;  Surgeon: Lien Acosta MD;  Location: [facility]IN [facility];  Service: Neurosurgery    ME COLONOSCOPY FLX DX W/COLLJ SPEC WHEN PFRMD N/A 2016    Procedure: EGD AND COLONOSCOPY;  Surgeon: Curlene Bence, MD;  Location: [facility] GI [facility]; Service: Gastroenterology    ME DILATION [facility] [facility] UNGUIDED SOUND/BOUGIE 1/MULT PASS N/A 2016    Procedure: DILATATION ESOPHAGEAL;  Surgeon: Curlene Bence, MD;  Location: [facility]; Service: Gastroenterology    ME ESOPHAGOGASTRODUODENOSCOPY TRANSORAL DIAGNOSTIC N/A 2016    Procedure: ESOPHAGOGASTRODUODENOSCOPY (EGD); Surgeon: Curlene Bence, MD;  Location: [facility];   Service: Gastroenterology    ME INSJ/RPLCMT SPI NPGR DIR/INDUXIVE COUPLING Left 2018    Procedure: removal of left buttock implantable pulse generator and placement of new  implantable pulse generator;  Surgeon: Lien Acosta MD;  Location: [facility];  Service: Neurosurgery     Social History   Social History     Substance and Sexual Activity   Alcohol Use Not Currently    Comment: Denied history of alcohol use     Social History     Substance and Sexual Activity   Drug Use No    Comment: Denied history of drug use     E-Cigarette/Vaping     E-Cigarette/Vaping Substances    Nicotine No     THC No     CBD No     Flavoring No     Other No     Unknown No      Social History     Tobacco Use   Smoking Status Former    Types: Cigarettes    Quit date: 1970    Years since quittin.9   Smokeless Tobacco Never   Tobacco Comments    Denied history of current ever day smoker, Former smoker and Never smoker all documented in [employer]     Family History:   Family History   Problem Relation Age of Onset    Lung cancer Mother 55    Pulmonary embolism Father     No Known Problems Sister     No Known Problems Daughter     No Known Problems Daughter     Stroke Maternal Grandmother     Heart attack Maternal Grandfather     No Known Problems Paternal Grandmother     No Known Problems Paternal Grandfather     No Known Problems Maternal Aunt     Diabetes Family         Diabetes mellitus    Hypertension Family     Stroke Family         Stroke complications       Meds/Allergies   current meds:   Current Facility-Administered Medications   Medication Dose Route Frequency    albuterol (PROVENTIL HFA,VENTOLIN HFA) inhaler 2 puff  2 puff Inhalation Q6H PRN    atorvastatin (LIPITOR) tablet 40 mg  40 mg Oral Daily With Dinner    ferrous sulfate tablet 325 mg  325 mg Oral Daily With Breakfast    heparin (porcine) subcutaneous injection 5,000 Units  5,000 Units Subcutaneous Q8H [address]    insulin lispro (HumaLOG) 100 units/mL subcutaneous injection 1-5 Units  1-5 Units Subcutaneous TID AC    latanoprost (XALATAN) 0.005 % ophthalmic solution 1 drop  1 drop Both Eyes HS    levothyroxine tablet 37.5 mcg  37.5 mcg Oral Daily    lidocaine (LIDODERM) 5 % patch 1 patch  1 patch Topical Daily    metoprolol succinate (TOPROL-XL) 24 hr tablet 50 mg  50 mg Oral Q12H ARY    Milnacipran HCl TABS 100 mg  1 tablet Oral Daily    oxybutynin (DITROPAN-XL) 24 hr tablet 10 mg  10 mg Oral Daily    oxyCODONE-acetaminophen (PERCOCET) 5-325 mg per tablet 2 tablet  2 tablet Oral Q12H PRN    pantoprazole (PROTONIX) EC tablet 40 mg  40 mg Oral Daily    ranolazine (RANEXA) 12 hr tablet 500 mg  500 mg Oral Q12H ARY    sodium chloride 0.9 % infusion  100 mL/hr Intravenous Continuous     Allergies   Allergen Reactions    Penicillins Anaphylaxis, Hives and Other (See Comments)     Other reaction(s): Unknown Reaction    Sulfa Antibiotics Anaphylaxis and Other (See Comments)     Other reaction(s): Unknown Reaction    Aspartame - Food Allergy Other (See Comments) and Hypertension     Slurred speech, weakness, stroke sx    Iodinated Contrast Media Hives     Pt has taken prep prior for contrast and has not had any break through reaction    Keflex [Cephalexin] Hives       Objective   Vitals: Blood pressure 127/68, pulse 80, temperature 98.2 °F (36.8 °C), resp.  rate 16, height 5' (1.524 m), weight 77.6 kg (171 lb 1.2 oz), SpO2 98 %, not currently breastfeeding. Physical Exam  Vitals and nursing note reviewed. Constitutional:       General: She is not in acute distress. Appearance: Normal appearance. She is normal weight. HENT:      Head: Normocephalic and atraumatic. Eyes:      General:         Right eye: No discharge. Left eye: No discharge. Pulmonary:      Effort: Pulmonary effort is normal. No respiratory distress. Musculoskeletal:         General: Normal range of motion. Cervical back: Normal range of motion and neck supple. No rigidity. Right lower leg: No edema. Left lower leg: No edema. Skin:     General: Skin is warm and dry. Findings: Erythema present. No wound. Comments: Sacrum was slightly erythematous but blanchable   Neurological:      General: No focal deficit present. Mental Status: She is alert and oriented to person, place, and time. Mental status is at baseline. Psychiatric:         Mood and Affect: Mood normal.         Behavior: Behavior normal.         Thought Content: Thought content normal.         Judgment: Judgment normal.           Lab Results: I have personally reviewed pertinent reports. Imaging: I have personally reviewed pertinent reports. EKG, Pathology, and Other Studies: I have personally reviewed pertinent reports. Code Status: Level 1 - Full Code      Counseling / Coordination of Care  Total floor / unit time spent today 15 minutes. Greater than 50% of total time was spent with the patient and / or family counseling and / or coordination of care. A description of the counseling / coordination of care: Wound care plan of care.

## 2023-11-28 NOTE — PLAN OF CARE

## 2023-11-28 NOTE — ASSESSMENT & PLAN NOTE
Presented as a prehospital stroke alert on 11/26 for left jaw numbness, left arm numbness and left facial droop. Patietn NIHSS 2 due to L facial droop and LLE drift. Was not a thrombolytic candidate secondary to last unknown well time. CT head without any acute abnormalities  CTA showed Stable moderate stenosis at the distal right MCA M1 segment compared to the prior 10/17/2023 examination. Unchanged focal moderate stenosis involving the inferior basilar artery with infundibular dilatation at the right AICA origin. 2. CTA neck: Moderate right extracranial ICA stenosis. The cervical vertebral arteries are patent.   Seen by neurology, unable to have MRI due to spinal cord stimulator repeat head CT stable  Echo deferred as recently completed  Status post Plavix load x 1, recommendation by neurology to continue Plavix 75 mg daily along with aspirin 81 mg daily for 90 days then DC Plavix and continue on aspirin monotherapy  Continue statin  Needs outpatient Zio patch/loop recorder  Recommending outpatient evaluation of her tremor once metabolic derangements are corrected  Pending PT/OT evaluations

## 2023-11-28 NOTE — ASSESSMENT & PLAN NOTE
Patient is on Percocet 10-3 25 every 12 hours as needed pain ( Pt usually takes it once per day if needed)   PDMP was reviewed by prior provider

## 2023-11-29 ENCOUNTER — APPOINTMENT (INPATIENT)
Dept: RADIOLOGY | Facility: HOSPITAL | Age: 78
DRG: 683 | End: 2023-11-29
Payer: MEDICARE

## 2023-11-29 PROBLEM — R33.9 URINARY RETENTION: Status: ACTIVE | Noted: 2023-11-29

## 2023-11-29 LAB
ANION GAP SERPL CALCULATED.3IONS-SCNC: 6 MMOL/L
BACTERIA UR QL AUTO: ABNORMAL /HPF
BASOPHILS # BLD AUTO: 0.03 THOUSANDS/ÂΜL (ref 0–0.1)
BASOPHILS NFR BLD AUTO: 1 % (ref 0–1)
BILIRUB UR QL STRIP: NEGATIVE
BUN SERPL-MCNC: 24 MG/DL (ref 5–25)
CALCIUM SERPL-MCNC: 7.6 MG/DL (ref 8.4–10.2)
CHLORIDE SERPL-SCNC: 104 MMOL/L (ref 96–108)
CHLORIDE UR-SCNC: 47 MMOL/L
CLARITY UR: CLEAR
CO2 SERPL-SCNC: 25 MMOL/L (ref 21–32)
COLOR UR: ABNORMAL
CREAT SERPL-MCNC: 1.6 MG/DL (ref 0.6–1.3)
CREAT UR-MCNC: 48.3 MG/DL
EOSINOPHIL # BLD AUTO: 0.32 THOUSAND/ÂΜL (ref 0–0.61)
EOSINOPHIL NFR BLD AUTO: 5 % (ref 0–6)
ERYTHROCYTE [DISTWIDTH] IN BLOOD BY AUTOMATED COUNT: 15.3 % (ref 11.6–15.1)
GFR SERPL CREATININE-BSD FRML MDRD: 30 ML/MIN/1.73SQ M
GLUCOSE SERPL-MCNC: 122 MG/DL (ref 65–140)
GLUCOSE SERPL-MCNC: 132 MG/DL (ref 65–140)
GLUCOSE SERPL-MCNC: 165 MG/DL (ref 65–140)
GLUCOSE SERPL-MCNC: 176 MG/DL (ref 65–140)
GLUCOSE SERPL-MCNC: 212 MG/DL (ref 65–140)
GLUCOSE UR STRIP-MCNC: NEGATIVE MG/DL
HCT VFR BLD AUTO: 26.3 % (ref 34.8–46.1)
HGB BLD-MCNC: 8.3 G/DL (ref 11.5–15.4)
HGB UR QL STRIP.AUTO: ABNORMAL
IMM GRANULOCYTES # BLD AUTO: 0.01 THOUSAND/UL (ref 0–0.2)
IMM GRANULOCYTES NFR BLD AUTO: 0 % (ref 0–2)
KETONES UR STRIP-MCNC: NEGATIVE MG/DL
LEUKOCYTE ESTERASE UR QL STRIP: NEGATIVE
LYMPHOCYTES # BLD AUTO: 1.51 THOUSANDS/ÂΜL (ref 0.6–4.47)
LYMPHOCYTES NFR BLD AUTO: 23 % (ref 14–44)
MCH RBC QN AUTO: 29.2 PG (ref 26.8–34.3)
MCHC RBC AUTO-ENTMCNC: 31.6 G/DL (ref 31.4–37.4)
MCV RBC AUTO: 93 FL (ref 82–98)
MONOCYTES # BLD AUTO: 0.7 THOUSAND/ÂΜL (ref 0.17–1.22)
MONOCYTES NFR BLD AUTO: 11 % (ref 4–12)
NEUTROPHILS # BLD AUTO: 3.89 THOUSANDS/ÂΜL (ref 1.85–7.62)
NEUTS SEG NFR BLD AUTO: 60 % (ref 43–75)
NITRITE UR QL STRIP: NEGATIVE
NON-SQ EPI CELLS URNS QL MICRO: ABNORMAL /HPF
NRBC BLD AUTO-RTO: 0 /100 WBCS
PH UR STRIP.AUTO: 6 [PH]
PLATELET # BLD AUTO: 209 THOUSANDS/UL (ref 149–390)
PMV BLD AUTO: 11.1 FL (ref 8.9–12.7)
POTASSIUM SERPL-SCNC: 4.3 MMOL/L (ref 3.5–5.3)
PROT UR STRIP-MCNC: NEGATIVE MG/DL
RBC # BLD AUTO: 2.84 MILLION/UL (ref 3.81–5.12)
RBC #/AREA URNS AUTO: ABNORMAL /HPF
SODIUM 24H UR-SCNC: 52 MOL/L
SODIUM SERPL-SCNC: 135 MMOL/L (ref 135–147)
SP GR UR STRIP.AUTO: 1.01 (ref 1–1.03)
UROBILINOGEN UR STRIP-ACNC: <2 MG/DL
UUN 24H UR-MCNC: 327 MG/DL
WBC # BLD AUTO: 6.46 THOUSAND/UL (ref 4.31–10.16)
WBC #/AREA URNS AUTO: ABNORMAL /HPF

## 2023-11-29 PROCEDURE — 99223 1ST HOSP IP/OBS HIGH 75: CPT | Performed by: INTERNAL MEDICINE

## 2023-11-29 PROCEDURE — 97167 OT EVAL HIGH COMPLEX 60 MIN: CPT

## 2023-11-29 PROCEDURE — G1004 CDSM NDSC: HCPCS

## 2023-11-29 PROCEDURE — 84300 ASSAY OF URINE SODIUM: CPT | Performed by: INTERNAL MEDICINE

## 2023-11-29 PROCEDURE — 99232 SBSQ HOSP IP/OBS MODERATE 35: CPT | Performed by: INTERNAL MEDICINE

## 2023-11-29 PROCEDURE — 84166 PROTEIN E-PHORESIS/URINE/CSF: CPT | Performed by: PHYSICIAN ASSISTANT

## 2023-11-29 PROCEDURE — 85025 COMPLETE CBC W/AUTO DIFF WBC: CPT | Performed by: INTERNAL MEDICINE

## 2023-11-29 PROCEDURE — 97163 PT EVAL HIGH COMPLEX 45 MIN: CPT

## 2023-11-29 PROCEDURE — 84540 ASSAY OF URINE/UREA-N: CPT | Performed by: INTERNAL MEDICINE

## 2023-11-29 PROCEDURE — 82436 ASSAY OF URINE CHLORIDE: CPT | Performed by: INTERNAL MEDICINE

## 2023-11-29 PROCEDURE — 82570 ASSAY OF URINE CREATININE: CPT | Performed by: INTERNAL MEDICINE

## 2023-11-29 PROCEDURE — 72131 CT LUMBAR SPINE W/O DYE: CPT

## 2023-11-29 PROCEDURE — 82948 REAGENT STRIP/BLOOD GLUCOSE: CPT

## 2023-11-29 PROCEDURE — 80048 BASIC METABOLIC PNL TOTAL CA: CPT | Performed by: INTERNAL MEDICINE

## 2023-11-29 PROCEDURE — 81001 URINALYSIS AUTO W/SCOPE: CPT | Performed by: INTERNAL MEDICINE

## 2023-11-29 RX ORDER — SODIUM CHLORIDE 9 MG/ML
75 INJECTION, SOLUTION INTRAVENOUS CONTINUOUS
Status: DISPENSED | OUTPATIENT
Start: 2023-11-29 | End: 2023-11-29

## 2023-11-29 RX ORDER — ACETAMINOPHEN 325 MG/1
650 TABLET ORAL EVERY 6 HOURS PRN
Status: DISCONTINUED | OUTPATIENT
Start: 2023-11-29 | End: 2023-12-01 | Stop reason: HOSPADM

## 2023-11-29 RX ADMIN — INSULIN LISPRO 1 UNITS: 100 INJECTION, SOLUTION INTRAVENOUS; SUBCUTANEOUS at 12:39

## 2023-11-29 RX ADMIN — LIDOCAINE 1 PATCH: 50 PATCH CUTANEOUS at 09:32

## 2023-11-29 RX ADMIN — PANTOPRAZOLE SODIUM 40 MG: 40 TABLET, DELAYED RELEASE ORAL at 09:31

## 2023-11-29 RX ADMIN — SODIUM CHLORIDE 75 ML/HR: 0.9 INJECTION, SOLUTION INTRAVENOUS at 10:53

## 2023-11-29 RX ADMIN — IRON SUCROSE 300 MG: 20 INJECTION, SOLUTION INTRAVENOUS at 12:29

## 2023-11-29 RX ADMIN — LEVOTHYROXINE SODIUM 37.5 MCG: 75 TABLET ORAL at 09:28

## 2023-11-29 RX ADMIN — HEPARIN SODIUM 5000 UNITS: 5000 INJECTION INTRAVENOUS; SUBCUTANEOUS at 21:57

## 2023-11-29 RX ADMIN — HEPARIN SODIUM 5000 UNITS: 5000 INJECTION INTRAVENOUS; SUBCUTANEOUS at 05:35

## 2023-11-29 RX ADMIN — HEPARIN SODIUM 5000 UNITS: 5000 INJECTION INTRAVENOUS; SUBCUTANEOUS at 13:31

## 2023-11-29 RX ADMIN — LATANOPROST 1 DROP: 50 SOLUTION OPHTHALMIC at 21:56

## 2023-11-29 RX ADMIN — INSULIN LISPRO 1 UNITS: 100 INJECTION, SOLUTION INTRAVENOUS; SUBCUTANEOUS at 09:47

## 2023-11-29 RX ADMIN — RANOLAZINE 500 MG: 500 TABLET, FILM COATED, EXTENDED RELEASE ORAL at 09:39

## 2023-11-29 RX ADMIN — OXYCODONE HYDROCHLORIDE AND ACETAMINOPHEN 2 TABLET: 5; 325 TABLET ORAL at 17:12

## 2023-11-29 RX ADMIN — OXYBUTYNIN 10 MG: 10 TABLET, FILM COATED, EXTENDED RELEASE ORAL at 09:38

## 2023-11-29 RX ADMIN — ATORVASTATIN CALCIUM 40 MG: 40 TABLET, FILM COATED ORAL at 16:54

## 2023-11-29 RX ADMIN — RANOLAZINE 500 MG: 500 TABLET, FILM COATED, EXTENDED RELEASE ORAL at 21:56

## 2023-11-29 RX ADMIN — OXYCODONE HYDROCHLORIDE AND ACETAMINOPHEN 2 TABLET: 5; 325 TABLET ORAL at 06:52

## 2023-11-29 RX ADMIN — FERROUS SULFATE TAB 325 MG (65 MG ELEMENTAL FE) 325 MG: 325 (65 FE) TAB at 09:41

## 2023-11-29 RX ADMIN — METOPROLOL SUCCINATE 50 MG: 50 TABLET, EXTENDED RELEASE ORAL at 21:55

## 2023-11-29 NOTE — PLAN OF CARE
Problem: Potential for Falls  Goal: Patient will remain free of falls  Description: INTERVENTIONS:  - Educate patient/family on patient safety including physical limitations  - Instruct patient to call for assistance with activity   - Consult OT/PT to assist with strengthening/mobility   - Keep Call bell within reach  - Keep bed low and locked with side rails adjusted as appropriate  - Keep care items and personal belongings within reach  - Initiate and maintain comfort rounds  - Make Fall Risk Sign visible to staff  - Offer Toileting every  Hours, in advance of need  - Initiate/Maintain alarm  - Obtain necessary fall risk management equipment:   - Apply yellow socks and bracelet for high fall risk patients  - Consider moving patient to room near nurses station  Outcome: Progressing     Problem: PAIN - ADULT  Goal: Verbalizes/displays adequate comfort level or baseline comfort level  Description: Interventions:  - Encourage patient to monitor pain and request assistance  - Assess pain using appropriate pain scale  - Administer analgesics based on type and severity of pain and evaluate response  - Implement non-pharmacological measures as appropriate and evaluate response  - Consider cultural and social influences on pain and pain management  - Notify physician/advanced practitioner if interventions unsuccessful or patient reports new pain  Outcome: Progressing     Problem: INFECTION - ADULT  Goal: Absence or prevention of progression during hospitalization  Description: INTERVENTIONS:  - Assess and monitor for signs and symptoms of infection  - Monitor lab/diagnostic results  - Monitor all insertion sites, i.e. indwelling lines, tubes, and drains  - Monitor endotracheal if appropriate and nasal secretions for changes in amount and color  - [place] appropriate cooling/warming therapies per order  - Administer medications as ordered  - Instruct and encourage patient and family to use good hand hygiene technique  - Identify and instruct in appropriate isolation precautions for identified infection/condition  Outcome: Progressing  Goal: Absence of fever/infection during neutropenic period  Description: INTERVENTIONS:  - Monitor WBC    Outcome: Progressing     Problem: SAFETY ADULT  Goal: Patient will remain free of falls  Description: INTERVENTIONS:  - Educate patient/family on patient safety including physical limitations  - Instruct patient to call for assistance with activity   - Consult OT/PT to assist with strengthening/mobility   - Keep Call bell within reach  - Keep bed low and locked with side rails adjusted as appropriate  - Keep care items and personal belongings within reach  - Initiate and maintain comfort rounds  - Make Fall Risk Sign visible to staff  - Offer Toileting every  Hours, in advance of need  - Initiate/Maintain alarm  - Obtain necessary fall risk management equipment:   - Apply yellow socks and bracelet for high fall risk patients  - Consider moving patient to room near nurses station  Outcome: Progressing  Goal: Maintain or return to baseline ADL function  Description: INTERVENTIONS:  -  Assess patient's ability to carry out ADLs; assess patient's baseline for ADL function and identify physical deficits which impact ability to perform ADLs (bathing, care of mouth/teeth, toileting, grooming, dressing, etc.)  - Assess/evaluate cause of self-care deficits   - Assess range of motion  - Assess patient's mobility; develop plan if impaired  - Assess patient's need for assistive devices and provide as appropriate  - Encourage maximum independence but intervene and supervise when necessary  - Involve family in performance of ADLs  - Assess for home care needs following discharge   - Consider OT consult to assist with ADL evaluation and planning for discharge  - Provide patient education as appropriate  Outcome: Progressing  Goal: Maintains/Returns to pre admission functional level  Description: INTERVENTIONS:  - Perform AM-PAC 6 Click Basic Mobility/ Daily Activity assessment daily.  - Set and communicate daily mobility goal to care team and patient/family/caregiver. - Collaborate with rehabilitation services on mobility goals if consulted  - Perform Range of Motion  times a day. - Reposition patient every  hours. - Dangle patient  times a day  - Stand patient  times a day  - Ambulate patient  times a day  - Out of bed to chair  times a day   - Out of bed for meals times a day  - Out of bed for toileting  - Record patient progress and toleration of activity level   Outcome: Progressing     Problem: DISCHARGE PLANNING  Goal: Discharge to home or other facility with appropriate resources  Description: INTERVENTIONS:  - Identify barriers to discharge w/patient and caregiver  - Arrange for needed discharge resources and transportation as appropriate  - Identify discharge learning needs (meds, wound care, etc.)  - Arrange for interpretive services to assist at discharge as needed  - Refer to Case Management Department for coordinating discharge planning if the patient needs post-hospital services based on physician/advanced practitioner order or complex needs related to functional status, cognitive ability, or social support system  Outcome: Progressing     Problem: Knowledge Deficit  Goal: Patient/family/caregiver demonstrates understanding of disease process, treatment plan, medications, and discharge instructions  Description: Complete learning assessment and assess knowledge base. Interventions:  - Provide teaching at level of understanding  - Provide teaching via preferred learning methods  Outcome: Progressing     Problem: Nutrition/Hydration-ADULT  Goal: Nutrient/Hydration intake appropriate for improving, restoring or maintaining nutritional needs  Description: Monitor and assess patient's nutrition/hydration status for malnutrition.  Collaborate with interdisciplinary team and initiate plan and interventions as ordered. Monitor patient's weight and dietary intake as ordered or per policy. Utilize nutrition screening tool and intervene as necessary. Determine patient's food preferences and provide high-protein, high-caloric foods as appropriate.      INTERVENTIONS:  - Monitor oral intake, urinary output, labs, and treatment plans  - Assess nutrition and hydration status and recommend course of action  - Evaluate amount of meals eaten  - Assist patient with eating if necessary   - Allow adequate time for meals  - Recommend/ encourage appropriate diets, oral nutritional supplements, and vitamin/mineral supplements  - Order, calculate, and assess calorie counts as needed  - Recommend, monitor, and adjust tube feedings and TPN/PPN based on assessed needs  - Assess need for intravenous fluids  - Provide specific nutrition/hydration education as appropriate  - Include patient/family/caregiver in decisions related to nutrition  Outcome: Progressing

## 2023-11-29 NOTE — PLAN OF CARE
Problem: PHYSICAL THERAPY ADULT  Goal: Performs mobility at highest level of function for planned discharge setting. See evaluation for individualized goals. Description: Treatment/Interventions: ADL retraining, LE strengthening/ROM, Functional transfer training, Therapeutic exercise, Endurance training, Patient/family training, Equipment eval/education, Bed mobility, Gait training, Compensatory technique education, Spoke to MD, Spoke to nursing, Spoke to case management, Spoke to advanced practitioner, OT, Family  Equipment Recommended: Leonarda Villa       See flowsheet documentation for full assessment, interventions and recommendations. Note: Prognosis: Fair  Problem List: Decreased strength, Decreased range of motion, Decreased endurance, Impaired balance, Decreased mobility, Decreased coordination, Pain  Assessment: Pt is a 78 y.o female presenting to [facility] on 11/26/2023 with stroke like symptoms being seen for a PT evaluation 11/29/2023. Pertinent PMH includes anemia, anxiety, arthritis, basal cell carcinoma in upper lip, chronic narcotic dependence, chronic pain, DM (unstable), dizziness, diverticulosis, fibromyalgia, GERD, HTN, hyperlipidemia, IBS, and gastroparesis. Pt is a high complexity evaluation due to decreased functional mobility compared to baseline, extensive PMH, increased risk of falls, and tremor in LLE. Pt required mod a x 2 for bed mobility, transfers, and gait requiring VC for proper hand placement with transfers. Pt ambulated 4' with the RW demonstrating narrow RUBIN, forward flexed posture, shortened stride length, increased UE dependence on AD, and decreased foot clearance. Important PT findings include dec B/L LE strength and power L>R, impaired endurance, impaired balance, and impaired coordination. Pt received an [facility] basic mobility inpatient raw score of 11. PT d/c recommendation at this time is return to rehab pending medical clearance.  Pt will continue to benefit from skilled PT during the remaining hospital stay to address these impairments. Pt left in the recliner chair at bedside with all needs within reach and the chair alarm activated. Rehab Resource Intensity Level, PT: II (Moderate Resource Intensity)    See flowsheet documentation for full assessment.

## 2023-11-29 NOTE — ASSESSMENT & PLAN NOTE
POA creatinine 1.58, baseline previously appears to be around 0.8-1.0 however during recent hospital stay did have SADAF with peak of 1.40. Creat worsened to 1.66 on 11/28.    During recent admission was taken off lisinopril indefinitely  S/P IVF overnight, creat 1.60 on 11/29  Noted to have urinary retention requiring straight caths x3--ahn placed 11/29  Avoid hypotension  Renal consulted

## 2023-11-29 NOTE — PROGRESS NOTES
[facility]  Progress Note  Name: Alida Perez  MRN: [id number]  [facility] Date of Admission: 11/26/2023   Date of Service: 11/29/2023 I Hospital Day: 3    Assessment/Plan   * Stroke-like symptoms  Assessment & Plan  Presented as a prehospital stroke alert on 11/26 for left jaw numbness, left arm numbness and left facial droop. Patietn NIHSS 2 due to L facial droop and LLE drift. Was not a thrombolytic candidate secondary to last unknown well time. CT head without any acute abnormalities  CTA showed Stable moderate stenosis at the distal right MCA M1 segment compared to the prior 10/17/2023 examination. Unchanged focal moderate stenosis involving the inferior basilar artery with infundibular dilatation at the right AICA origin. 2. CTA neck: Moderate right extracranial ICA stenosis. The cervical vertebral arteries are patent. Seen by neurology, unable to have MRI due to spinal cord stimulator repeat head CT stable  Echo deferred as recently completed  Status post Plavix load x 1, recommendation by neurology to continue Plavix 75 mg daily along with aspirin 81 mg daily for 90 days then DC Plavix and continue on aspirin monotherapy  Continue statin  Needs outpatient Zio patch/loop recorder  Recommending outpatient evaluation of her tremor once metabolic derangements are corrected  PT/OT recommending rehab     Urinary retention  Assessment & Plan  Noted overnight after SADAF protocol ordered  S/p straight cath x3--ahn will be placed now per protocol  Never noted prior per daughter, given above will check CT Lumbar spine. Cannot do MRI due to prior SCS    Tremor  Assessment & Plan  Noted upon examination.  Neurology evaluated, unclear if related to metabolic derangements, however patient reportedly does have a family history of tremor  Neurology is recommending outpatient workup for this after her SADAF is resolved   Appears mildly improved on 11/29  Checking lumbar CT    SADAF (acute kidney injury) ([address])  Assessment & Plan  POA creatinine 1.58, baseline previously appears to be around 0.8-1.0 however during recent hospital stay did have SADAF with peak of 1.40. Creat worsened to 1.66 on 11/28. During recent admission was taken off lisinopril indefinitely  S/P IVF overnight, creat 1.60 on 11/29  Noted to have urinary retention requiring straight caths x3--ahn placed 11/29  Avoid hypotension  Renal consulted     History of lumbar surgery  Assessment & Plan  Unclear details but last imaging in system CT thoracic and lumbar spine wo contrast 4/19/2018 showed remote L4-L5 fusion, accomplished with dual threaded metallic interbody cages inserted posteriorly through wide bilateral laminectomy defects, no residual stenosis. Critical canal stenosis L3-L4, severe canal stenosis T12-L1, L1-2, and L2-3. May require neurosurgery eval    Exertional shortness of breath  Assessment & Plan  [facility] for this approx 2 weeks ago. Was seen by cardiology, felt not to be heart failure, more likely hypovolemic given SADAF.   VQ scan was negative, echo reviewed EF 32%, grade 1 diastolic dysfunction and no significant valvular disease  Monitor O2 sats with ambulation    Chronic obstructive pulmonary disease, unspecified COPD type ([address])  Assessment & Plan  Currently patient is not in any respiratory exacerbation  Albuterol as needed    S/P insertion of spinal cord stimulator  Assessment & Plan  Unclear details but not MRI compatible     Overactive bladder  Assessment & Plan  Continue home medication of oxybutynin 10 mg    Hypothyroidism  Assessment & Plan  TSH 3.889  Continue levothyroxine 37.5 mg daily  Follow up outpatient PCP     Hypercholesterolemia  Assessment & Plan  Continue statin therapy    Anxiety  Assessment & Plan  At baseline  Continue Lexapro  Supportive care     Iron deficiency anemia secondary to inadequate dietary iron intake  Assessment & Plan  Baseline appears between 8 and 9, unclear if this has been worked up in the outpatient setting   continue iron supplementation  Monitor hgb     Hypertension  Assessment & Plan  Blood pressure relatively well-controlled, continue Toprol-XL 50 mg twice daily, this was recently increased during prior admission as she was taken off lisinopril  Apparently was previously on HCTZ as well--should remain off     Chronic pain disorder  Assessment & Plan  Patient is on Percocet 10-3 25 every 12 hours as needed pain ( Pt usually takes it once per day if needed)   PDMP was reviewed by prior provider    Type 2 diabetes mellitus [facility])  Assessment & Plan  Lab Results   Component Value Date    HGBA1C 6.5 (H) 11/26/2023       Recent Labs     11/28/23  1115 11/28/23  1808 11/28/23  2049 11/29/23  0946   POCGLU 187* 189* 184* 176*         Blood Sugar Average: Last 72 hrs:  (P) 147.5    Well-controlled as A1c is 6.5, patient takes metformin 1000 mg twice daily as an outpatient  Metformin currently held, on sliding scale insulin  Likely can resume metformin at discharge             VTE Pharmacologic Prophylaxis: VTE Score: 10 Moderate Risk (Score 3-4) - Pharmacological DVT Prophylaxis Ordered: heparin. Mobility:   Basic Mobility Inpatient Raw Score: 13  -HLM Goal: 4: Move to chair/commode  JH-HLM Achieved: 2: Bed activities/Dependent transfer  HLM Goal NOT achieved. Continue with multidisciplinary rounding and encourage appropriate mobility to improve upon [facility] goals. Patient Centered Rounds: I performed bedside rounds with nursing staff today. Discussions with Specialists or Other Care Team Provider: d/w CM, nephrology    Education and Discussions with Family / Patient: Updated  (daughter) via phone. Total Time Spent on Date of Encounter in care of patient: 34 mins.  This time was spent on one or more of the following: performing physical exam; counseling and coordination of care; obtaining or reviewing history; documenting in the medical record; reviewing/ordering tests, medications or procedures; communicating with other healthcare professionals and discussing with patient's family/caregivers. Current Length of Stay: 3 day(s)  Current Patient Status: Inpatient   Certification Statement: The patient will continue to require additional inpatient hospital stay due to SADAF, urinary retention protocol, PT/OT evaluations   Discharge Plan: Anticipate discharge in 24-48 hrs to discharge location to be determined pending rehab evaluations. Code Status: Level 1 - Full Code    Subjective:   Feels okay. States her tremor feels better at rest. Waiting to get up with PT. Has required straight caths. No history of this per patient. No dysuria. Numbness complaint improved per my d/w patient. Objective:     Vitals:   Temp (24hrs), Av.3 °F (36.8 °C), Min:97.4 °F (36.3 °C), Max:99 °F (37.2 °C)    Temp:  [97.4 °F (36.3 °C)-99 °F (37.2 °C)] 98.2 °F (36.8 °C)  HR:  [74-80] 76  Resp:  [16-18] 17  BP: (107-144)/(61-71) 107/61  SpO2:  [94 %-99 %] 98 %  Body mass index is 33.41 kg/m². Input and Output Summary (last 24 hours): Intake/Output Summary (Last 24 hours) at 2023 1239  Last data filed at 2023 1155  Gross per 24 hour   Intake 1793.33 ml   Output 3355 ml   Net -1561.67 ml       Physical Exam:   Physical Exam  Vitals and nursing note reviewed. Constitutional:       General: She is not in acute distress. Appearance: She is obese. Comments: On RA    Cardiovascular:      Rate and Rhythm: Normal rate and regular rhythm. Pulmonary:      Effort: No respiratory distress. Breath sounds: No wheezing. Abdominal:      General: There is no distension. Tenderness: There is no abdominal tenderness. Musculoskeletal:      Right lower leg: No edema. Left lower leg: No edema. Comments: Strength 5/5 lower extremities, sensation intact to gross touch    Skin:     Coloration: Skin is pale.    Neurological: Mental Status: She is oriented to person, place, and time. Psychiatric:         Mood and Affect: Mood normal.         Additional Data:     Labs:  Results from last 7 days   Lab Units 11/29/23  0537   WBC Thousand/uL 6.46   HEMOGLOBIN g/dL 8.3*   HEMATOCRIT % 26.3*   PLATELETS Thousands/uL 209   NEUTROS PCT % 60   LYMPHS PCT % 23   MONOS PCT % 11   EOS PCT % 5     Results from last 7 days   Lab Units 11/29/23  0537 11/28/23  0514 11/27/23  0413   SODIUM mmol/L 135   < > 131*   POTASSIUM mmol/L 4.3   < > 5.0   CHLORIDE mmol/L 104   < > 97   CO2 mmol/L 25   < > 24   BUN mg/dL 24   < > 31*   CREATININE mg/dL 1.60*   < > 1.52*   ANION GAP mmol/L 6   < > 10   CALCIUM mg/dL 7.6*   < > 7.6*   ALBUMIN g/dL  --   --  3.1*   TOTAL BILIRUBIN mg/dL  --   --  0.31   ALK PHOS U/L  --   --  56   ALT U/L  --   --  9   AST U/L  --   --  21   GLUCOSE RANDOM mg/dL 122   < > 72    < > = values in this interval not displayed. Results from last 7 days   Lab Units 11/26/23  1355   INR  0.96     Results from last 7 days   Lab Units 11/29/23  1145 11/29/23  0946 11/28/23  2049 11/28/23  1808 11/28/23  1115 11/28/23  0744 11/27/23  2129 11/27/23  1638 11/27/23  1142 11/27/23  0826 11/26/23  2242 11/26/23  1717   POC GLUCOSE mg/dl 165* 176* 184* 189* 187* 126 185* 239* 138 97 79 82     Results from last 7 days   Lab Units 11/26/23  1355   HEMOGLOBIN A1C % 6.5*           Lines/Drains:  Invasive Devices       Peripheral Intravenous Line  Duration             Peripheral IV 11/18/23 Right Antecubital 10 days    Peripheral IV 11/29/23 Left Antecubital <1 day              Drain  Duration             Urethral Catheter Latex 16 Fr. <1 day                          Imaging: No pertinent imaging reviewed.     Recent Cultures (last 7 days):         Last 24 Hours Medication List:   Current Facility-Administered Medications   Medication Dose Route Frequency Provider Last Rate    albuterol  2 puff Inhalation Q6H PRN Jerica Rodriguez MD      atorvastatin 40 mg Oral Daily With [address], CRNP      ferrous sulfate  325 mg Oral Daily With Breakfast Vitaly Wise, MD      heparin (porcine)  5,000 Units Subcutaneous [facility] Thor Billie, MD      insulin lispro  1-5 Units Subcutaneous TID [facility] [place] Vitaly Delay, MD      iron sucrose  300 mg Intravenous Daily [address], [facility] 300 mg (11/29/23 1229)    latanoprost  1 drop Both Eyes HS Thor Delay, MD      levothyroxine  37.5 mcg Oral Daily Thor Delay, MD      lidocaine  1 patch Topical Daily Clementine Alcocer PA-C      metoprolol succinate  50 mg Oral Q12H [address] Thor Delay, MD      Milnacipran HCl  1 tablet Oral Daily Thor Delay, MD      oxybutynin  10 mg Oral Daily Thor Delay, MD      oxyCODONE-acetaminophen  2 tablet Oral Q12H PRN Thor Delay, MD      pantoprazole  40 mg Oral Daily Thor Delay, MD      ranolazine  500 mg Oral Q12H [address] Thor Delay, MD      sodium chloride  75 mL/hr Intravenous Continuous [address] [place] 75 mL/hr (11/29/23 1053)        Today, Patient Was Seen By: Clementine Alcocer PA-C    **Please Note: This note may have been constructed using a voice recognition system. **

## 2023-11-29 NOTE — PLAN OF CARE
Problem: OCCUPATIONAL THERAPY ADULT  Goal: Performs self-care activities at highest level of function for planned discharge setting. See evaluation for individualized goals. Description: Treatment Interventions: ADL retraining, Functional transfer training, Endurance training, Patient/family training, Cognitive reorientation, Equipment evaluation/education, Compensatory technique education, Energy conservation, Activityengagement          See flowsheet documentation for full assessment, interventions and recommendations. Note: Limitation: Decreased ADL status, Decreased Safe judgement during ADL, Decreased cognition, Decreased endurance, Decreased self-care trans, Decreased high-level ADLs  Prognosis: Good  Assessment: 77 YO Female SEEN FOR INITIAL OCCUPATIONAL THERAPY EVALUATION FOLLOWING ADMISSION TO [facility] WITH STROKE-LIKE SYMPTOMS. CTH (-). CTA showed Stable moderate stenosis at the distal right MCA M1 segment compared to the prior 10/17/2023 examination. Unchanged focal moderate stenosis involving the inferior basilar artery with infundibular dilatation at the right AICA origin. 2. CTA neck: Moderate right extracranial ICA stenosis. The cervical vertebral arteries are patent. UNABLE TO OBTAIN MRI. PROBLEMS LIST/PMH INCLUDES Acid reflux, Anemia, Anxiety, Arthritis, Asthma, Basal cell carcinoma, Chronic narcotic dependence ([address]), Chronic pain, Colon polyp, Cystocele, Diabetes mellitus ([address]), Disease of thyroid gland, Diverticulosis, Dizziness, Dysfunctional uterine bleeding, Dysphagia, Fibromyalgia, Gastric ulcer, Gastroparesis, History of colonic polyps, History of gastroesophageal reflux (GERD), Hypercholesterolemia, Hyperlipidemia, Hypertension, IBS (irritable bowel syndrome), Pneumobilia, Post laminectomy syndrome, S/P insertion of spinal cord stimulator, Seasonal allergies, Shortness of breath, Spinal stenosis, Status post lumbar spinal fusion, and Stroke ([address]).  PT IS FROM HOME WITH SON AT BASELINE WHERE SHE REPORTS BEING INDEPENDENT WITH ADLS WITH ASSIST FOR BATHING FROM [facility] 2X/WK. [facility]CEIV[facility] INPT REHAB SERVICES AND REQUIRING ADDITIONAL ASSIST. PT CURRENTLY REQUIRES OVERALL MIN A WITH UB ADLS, MAX A WITH LB ADLS, AND MOD A X2 WITH TRANSFERS / LIMITED FUNCTIONAL MOBILITY WITH USE OF RW. PT IS LIMITED 2' FATIGUE, IMPAIRED BALANCE, FALL RISK , OVERALL WEAKNESS/DECONDITIONING , MILD LUE WEAKNESS, MILD LLE SENSORY DEFICITS, LLE TREMORS NOTED WITH ACTIVITY, DIZZINESS WITH CHANGE OF POSITIONING , INACCESSIBLE HOME ENVIRONMENT, and OVERALL LIMITED ACTIVITY TOLERANCE. PT EDUCATED ON DEEP BREATHING TECHNIQUES T/O ACTIVITY, SLOWING OF PACE, ENERGY CONSERVATION TECHNIQUES FOR CARRY OVER UPON D/C, and CONTINUE PARTICIPATION IN SELF-CARE/MOBILITY WITH STAFF WHILE IN THE HOSPITAL . The patient's raw score on the AM-PAC Daily Activity Inpatient Short Form is 14. A raw score of less than 19 suggests the patient may benefit from discharge to post-acute rehabilitation services. Please refer to the recommendation of the Occupational Therapist for safe discharge planning. FROM AN OCCUPATIONAL THERAPY PERSPECTIVE, RECOMMEND LEVEL II RESOURCES UPON D/C. WILL CONT TO FOLLOW TO ADDRESS THE BELOW DESCRIBED GOALS.      Rehab Resource Intensity Level, OT: II (Moderate Resource Intensity)

## 2023-11-29 NOTE — CONSULTS
Consultation - Nephrology   Diego Munoz 78 y.o. female MRN: [id number]  Unit/Bed#: [facility] 623-01 Encounter: [id number]    ASSESSMENT:  Acute Kidney Injury, POA- likely secondary to prerenal azotemia from decreased oral intake complicated by IV contrast on admission + post renal urinary retention without hydronephrosis  Baseline creatinine 0.9  Admission creatinine 1.6  Creatinine remains stable without change on day of consult  UA: bland on 11/18/23  Repeat UA pending  PVR: elevated, continue serial monitoring  Recommend ahn insertion if continues to be elevated without improvement in creatinine  Imaging: no hydronephrosis on CT  Will give further IVF x10 hours today due to contrast on admission and decreased oral intake  Urinary Retention- without hydronephrosis  Continue to monitor PVRs  Hyponatremia- improved with IVF  Hypertension- BP acceptable  Previously on lisinopril but has been discontinued indefinitely last admission  Anemia- hgb below goal but stable in the 8s  iron deficient- venofer 300mg x3 days  Check SPEP, UPEP, FLC  Diabetes mellitus- per primary team, outpatient metformin on hold  Stroke like symptoms- neurology signed off    PLAN:  Venofer  Myeloma workup  AM labs  IVF  UA  PVR    HISTORY OF PRESENT ILLNESS:  Requesting Physician: Margarita Jay DO  Reason for Consult: SADAF    Diego Munoz is a 78y.o. year old female who was admitted to [address] [place] [address],[address] after presenting with stroke like symptoms. She received a CTA head, chest, abdomen, pelvis. A renal consultation is requested today for assistance in the management of acute kidney injury. She is currently feeling well overall. She denies SOB, N/V/D, dizziness, lightheadedness. No drop in blood pressure noted. She states her urine output has been diminished recently and she has had + frequency with small amounts of urine. She states she eats breakfast but not too much else during the day.   She has mild ankle edema at baseline. PAST MEDICAL HISTORY:  Past Medical History:   Diagnosis Date    Acid reflux     Anemia     hx of iron-deficient    Anxiety     Arthritis     Asthma     last needed inhaler last year 2020    Basal cell carcinoma     upper lip    Chronic narcotic dependence (HCC)     Chronic pain     Colon polyp     Cystocele     Diabetes mellitus ([address])     stable    Disease of thyroid gland     hypothyroidism    Diverticulosis     Dizziness     at times    Dysfunctional uterine bleeding     last assessed - [id number]    Dysphagia     Fibromyalgia     Gastric ulcer     Gastroparesis     History of colonic polyps     last assessed - [id number]    History of gastroesophageal reflux (GERD)     Hypercholesterolemia     Hyperlipidemia     Hypertension     IBS (irritable bowel syndrome)     Pneumobilia 06/18/2022    Post laminectomy syndrome     S/P insertion of spinal cord stimulator 07/18/2018    Seasonal allergies     Shortness of breath     exertional    Spinal stenosis     Status post lumbar spinal fusion 03/16/2018    Stroke [facility])     pt states slight stroke March 2022       PAST SURGICAL HISTORY:  Past Surgical History:   Procedure Laterality Date    APPENDECTOMY      BACK SURGERY      BREAST CYST EXCISION Left     CARDIAC CATHETERIZATION  11/14/2023    Procedure: Cardiac catheterization;  Surgeon: Pricila Rose MD;  [facility]: [facility]ARD[facility]; Service: Cardiology    CARDIAC CATHETERIZATION N/A 11/14/2023    Procedure: Cardiac Coronary Angiogram;  Surgeon: Pricila Rose MD;  [facility]: [facility] CARD[facility]AC [facility]ATH [facility]; Service: Cardiology    CHOLECYSTECTOMY      COLONOSCOPY      ESOPHAGOGASTRODUODENOSCOPY N/A 09/28/2016    Procedure: ESOPHAGOGASTRODUODENOSCOPY (EGD); Surgeon: Maria Victoria Conley MD;  Location: [facility] GI LAB;   Service:     HERNIA REPAIR      HYSTERECTOMY      TTAH-BSO age 25    LAMINECTOMY      LUMBAR LAMINECTOMY      OOPHORECTOMY      age 25    IA ARTHRODESIS POSTERIOR/PSTLAT TQ 1NTRSPC THORACIC N/A 06/04/2018 Procedure: Reopening of lumbar incision for T12-L5 posterior instrumented fixation and fusion and T12-L4 posterior decompression;  Surgeon: Rebecca Sandoval MD;  Location: [facility] MAIN OR;  Service: Neurosurgery    AZ COLONOSCOPY FLX DX W/COLLJ SPEC WHEN PFRMD N/A 2016    Procedure: EGD AND COLONOSCOPY;  Surgeon: Augustus Garcia MD;  Location: [facility] GI LAB; Service: Gastroenterology    AZ DILATION [facility] UNGUIDED SOUND/BOUGIE 1/MULT PASS N/A 2016    Procedure: DILATATION ESOPHAGEAL;  Surgeon: Augustus Garcia MD;  Location: [facility] GI LAB; Service: Gastroenterology    AZ ESOPHAGOGASTRODUODENOSCOPY TRANSORAL DIAGNOSTIC N/A 2016    Procedure: ESOPHAGOGASTRODUODENOSCOPY (EGD); Surgeon: Augustus Garcia MD;  Location: [facility] GI LAB;   Service: Gastroenterology    AZ INSJ/RPLCMT SPI NPGR DIR/INDUXIVE COUPLING Left 2018    Procedure: removal of left buttock implantable pulse generator and placement of new  implantable pulse generator;  Surgeon: Rebecca Sandoval MD;  Location: [facility] MAIN [facility];  Service: Neurosurgery       ALLERGIES:  Allergies   Allergen Reactions    Penicillins Anaphylaxis, Hives and Other (See Comments)     Other reaction(s): Unknown Reaction    Sulfa Antibiotics Anaphylaxis and Other (See Comments)     Other reaction(s): Unknown Reaction    Aspartame - Food Allergy Other (See Comments) and Hypertension     Slurred speech, weakness, stroke sx    Iodinated Contrast Media Hives     Pt has taken prep prior for contrast and has not had any break through reaction    Keflex [Cephalexin] Hives       SOCIAL HISTORY:  Social History     Substance and Sexual Activity   Alcohol Use Not Currently    Comment: Denied history of alcohol use     Social History     Substance and Sexual Activity   Drug Use No    Comment: Denied history of drug use     Social History     Tobacco Use   Smoking Status Former    Types: Cigarettes    Quit date: 1970    Years since quittin.9   Smokeless Tobacco Never   Tobacco Comments    Denied history of current ever day smoker, Former smoker and Never smoker all documented in [employer]       FAMILY HISTORY:  Family History   Problem Relation Age of Onset    Lung cancer Mother 55    Pulmonary embolism Father     No Known Problems Sister     No Known Problems Daughter     No Known Problems Daughter     Stroke Maternal Grandmother     Heart attack Maternal Grandfather     No Known Problems Paternal Grandmother     No Known Problems Paternal Grandfather     No Known Problems Maternal Aunt     Diabetes Family         Diabetes mellitus    Hypertension Family     Stroke Family         Stroke complications       MEDICATIONS:    Current Facility-Administered Medications:     albuterol (PROVENTIL HFA,VENTOLIN HFA) inhaler 2 puff, 2 puff, Inhalation, Q6H PRN, Pan Leal MD    atorvastatin (LIPITOR) tablet 40 mg, 40 mg, Oral, Daily With [place] Cool, CRNP, 40 mg at 11/28/23 1531    ferrous sulfate tablet 325 mg, 325 mg, Oral, Daily With Breakfast, Pan Leal MD, 325 mg at 11/29/23 0941    heparin (porcine) subcutaneous injection 5,000 Units, 5,000 Units, Subcutaneous, Q8H [address], 5,000 Units at 11/29/23 0535 **AND** [COMPLETED] Platelet count, , , Once, Pan Leal MD    insulin lispro (HumaLOG) 100 units/mL subcutaneous injection 1-5 Units, 1-5 Units, Subcutaneous, TID AC, 1 Units at 11/29/23 0947 **AND** Fingerstick Glucose (POCT), , , TID AC, Pan Leal MD    iron sucrose (VENOFER) 300 mg in sodium chloride 0.9 % 250 mL IVPB, 300 mg, Intravenous, Daily, Gail Chi PA-C    latanoprost (XALATAN) 0.005 % ophthalmic solution 1 drop, 1 drop, Both Eyes, HS, Pan Leal MD, 1 drop at 11/28/23 2131    levothyroxine tablet 37.5 mcg, 37.5 mcg, Oral, Daily, Pan Leal MD, 37.5 mcg at 11/29/23 0928    lidocaine (LIDODERM) 5 % patch 1 patch, 1 patch, Topical, Daily, Elizabeth Duffy PA-C, 1 patch at 11/29/23 0932    metoprolol succinate (TOPROL-XL) 24 hr tablet 50 mg, 50 mg, Oral, Q12H [address], Marcelino Garcia MD, 50 mg at 11/28/23 2127    Milnacipran HCl TABS 100 mg, 1 tablet, Oral, Daily, Marcelino Garcia MD    oxybutynin (DITROPAN-XL) 24 hr tablet 10 mg, 10 mg, Oral, Daily, Marcelino Garcia MD, 10 mg at 11/29/23 2090    oxyCODONE-acetaminophen (PERCOCET) 5-325 mg per tablet 2 tablet, 2 tablet, Oral, Q12H PRN, Marcelino Garcia MD, 2 tablet at 11/29/23 5903    pantoprazole (PROTONIX) EC tablet 40 mg, 40 mg, Oral, Daily, Marcelino Garcia MD, 40 mg at 11/29/23 0931    ranolazine (RANEXA) 12 hr tablet 500 mg, 500 mg, Oral, Q12H [address], Marcelino Garcia MD, 500 mg at 11/29/23 0939    sodium chloride 0.9 % infusion, 75 mL/hr, Intravenous, Continuous, [address] [place] [address], Last Rate: 75 mL/hr at 11/29/23 1053, 75 mL/hr at 11/29/23 1053    REVIEW OF SYSTEMS:  A complete 10 point review of systems was performed and found to be negative unless otherwise noted in the history of present illness. General: No fevers, chills. Cardiovascular:  No chest pain,  mild  leg edema. Respiratory: No cough, sputum production,  No shortness of breath. Gastrointestinal:  No nausea/vomiting,  No diarrhea,  No abdominal pain. Genitourinary: No hematuria. No foamy urine. No dysuria.  + frequency. + decreased amount.      PHYSICAL EXAM:  Current Weight: Weight - Scale: 77.6 kg (171 lb 1.2 oz)  First Weight: Weight - Scale: 77.6 kg (171 lb 1.2 oz)  Vitals:    11/29/23 0539 11/29/23 0801 11/29/23 0931 11/29/23 0937   BP: 118/65 144/71 107/61 107/61   Pulse: 76 74 76 76   Resp: 18 17     Temp: (!) 97.4 °F (36.3 °C) 98.2 °F (36.8 °C)     TempSrc:       SpO2: 99% 94%  98%   Weight:       Height:           Intake/Output Summary (Last 24 hours) at 11/29/2023 1203  Last data filed at 11/29/2023 2851  Gross per 24 hour   Intake 1793.33 ml   Output 2875 ml   Net -1081.67 ml     General: NAD  Skin: no rash  Eyes: anicteric  ENMT: mm moist  Neck: no masses  Respiratory: CTAB  CVS: RRR  Extremities: mild bilateral LE edema  GI: soft nt nd  Neuro: alert awake  Psych: mood and affect appropriate    Lab Results:   Results from last 7 days   Lab Units 11/29/23  0537 11/28/23  0514 11/27/23  0413   WBC Thousand/uL 6.46 7.12 7.26   HEMOGLOBIN g/dL 8.3* 8.7* 8.3*   HEMATOCRIT % 26.3* 26.8* 26.2*   PLATELETS Thousands/uL 209 240 199   POTASSIUM mmol/L 4.3 4.2 5.0   CHLORIDE mmol/L 104 96 97   CO2 mmol/L 25 27 24   BUN mg/dL 24 30* 31*   CREATININE mg/dL 1.60* 1.66* 1.52*   CALCIUM mg/dL 7.6* 8.5 7.6*   ALK PHOS U/L  --   --  56   ALT U/L  --   --  9   AST U/L  --   --  21     I have personally reviewed the blood work as stated above and in my note. I have personally reviewed slim note.

## 2023-11-29 NOTE — ASSESSMENT & PLAN NOTE
Noted upon examination.  Neurology evaluated, unclear if related to metabolic derangements, however patient reportedly does have a family history of tremor  Neurology is recommending outpatient workup for this after her SADAF is resolved   Appears mildly improved on 11/29  Checking lumbar CT

## 2023-11-29 NOTE — PLAN OF CARE
Problem: Potential for Falls  Goal: Patient will remain free of falls  Description: INTERVENTIONS:  - Educate patient/family on patient safety including physical limitations  - Instruct patient to call for assistance with activity   - Consult OT/PT to assist with strengthening/mobility   - Keep Call bell within reach  - Keep bed low and locked with side rails adjusted as appropriate  - Keep care items and personal belongings within reach  - Initiate and maintain comfort rounds  - Make Fall Risk Sign visible to staff  - Offer Toileting every 2 Hours, in advance of need  - Initiate/Maintain alarm  - Obtain necessary fall risk management equipment:   - Apply yellow socks and bracelet for high fall risk patients  - Consider moving patient to room near nurses station  Outcome: Progressing     Problem: PAIN - ADULT  Goal: Verbalizes/displays adequate comfort level or baseline comfort level  Description: Interventions:  - Encourage patient to monitor pain and request assistance  - Assess pain using appropriate pain scale  - Administer analgesics based on type and severity of pain and evaluate response  - Implement non-pharmacological measures as appropriate and evaluate response  - Consider cultural and social influences on pain and pain management  - Notify physician/advanced practitioner if interventions unsuccessful or patient reports new pain  Outcome: Progressing     Problem: INFECTION - ADULT  Goal: Absence or prevention of progression during hospitalization  Description: INTERVENTIONS:  - Assess and monitor for signs and symptoms of infection  - Monitor lab/diagnostic results  - Monitor all insertion sites, i.e. indwelling lines, tubes, and drains  - Monitor endotracheal if appropriate and nasal secretions for changes in amount and color  - [place] appropriate cooling/warming therapies per order  - Administer medications as ordered  - Instruct and encourage patient and family to use good hand hygiene technique  - Identify and instruct in appropriate isolation precautions for identified infection/condition  Outcome: Progressing  Goal: Absence of fever/infection during neutropenic period  Description: INTERVENTIONS:  - Monitor WBC    Outcome: Progressing     Problem: SAFETY ADULT  Goal: Patient will remain free of falls  Description: INTERVENTIONS:  - Educate patient/family on patient safety including physical limitations  - Instruct patient to call for assistance with activity   - Consult OT/PT to assist with strengthening/mobility   - Keep Call bell within reach  - Keep bed low and locked with side rails adjusted as appropriate  - Keep care items and personal belongings within reach  - Initiate and maintain comfort rounds  - Make Fall Risk Sign visible to staff  - Offer Toileting every 2 Hours, in advance of need  - Initiate/Maintain alarm  - Obtain necessary fall risk management equipment:   - Apply yellow socks and bracelet for high fall risk patients  - Consider moving patient to room near nurses station  Outcome: Progressing  Goal: Maintain or return to baseline ADL function  Description: INTERVENTIONS:  - Educate patient/family on patient safety including physical limitations  - Instruct patient to call for assistance with activity   - Consult OT/PT to assist with strengthening/mobility   - Keep Call bell within reach  - Keep bed low and locked with side rails adjusted as appropriate  - Keep care items and personal belongings within reach  - Initiate and maintain comfort rounds  - Make Fall Risk Sign visible to staff  - Offer Toileting every 2 Hours, in advance of need  - Initiate/Maintain alarm  - Obtain necessary fall risk management equipment:  - Apply yellow socks and bracelet for high fall risk patients  - Consider moving patient to room near nurses station  Outcome: Progressing  Goal: Maintains/Returns to pre admission functional level  Description: INTERVENTIONS:  -  Assess patient's ability to carry out ADLs; assess patient's baseline for ADL function and identify physical deficits which impact ability to perform ADLs (bathing, care of mouth/teeth, toileting, grooming, dressing, etc.)  - Assess/evaluate cause of self-care deficits   - Assess range of motion  - Assess patient's mobility; develop plan if impaired  - Assess patient's need for assistive devices and provide as appropriate  - Encourage maximum independence but intervene and supervise when necessary  - Involve family in performance of ADLs  - Assess for home care needs following discharge   - Consider OT consult to assist with ADL evaluation and planning for discharge  - Provide patient education as appropriate  Outcome: Progressing     Problem: DISCHARGE PLANNING  Goal: Discharge to home or other facility with appropriate resources  Description: INTERVENTIONS:  - Identify barriers to discharge w/patient and caregiver  - Arrange for needed discharge resources and transportation as appropriate  - Identify discharge learning needs (meds, wound care, etc.)  - Arrange for interpretive services to assist at discharge as needed  - Refer to Case Management Department for coordinating discharge planning if the patient needs post-hospital services based on physician/advanced practitioner order or complex needs related to functional status, cognitive ability, or social support system  Outcome: Progressing     Problem: Knowledge Deficit  Goal: Patient/family/caregiver demonstrates understanding of disease process, treatment plan, medications, and discharge instructions  Description: Complete learning assessment and assess knowledge base.   Interventions:  - Provide teaching at level of understanding  - Provide teaching via preferred learning methods  Outcome: Progressing     Problem: Nutrition/Hydration-ADULT  Goal: Nutrient/Hydration intake appropriate for improving, restoring or maintaining nutritional needs  Description: Monitor and assess patient's nutrition/hydration status for malnutrition. Collaborate with interdisciplinary team and initiate plan and interventions as ordered. Monitor patient's weight and dietary intake as ordered or per policy. Utilize nutrition screening tool and intervene as necessary. Determine patient's food preferences and provide high-protein, high-caloric foods as appropriate.      INTERVENTIONS:  - Monitor oral intake, urinary output, labs, and treatment plans  - Assess nutrition and hydration status and recommend course of action  - Evaluate amount of meals eaten  - Assist patient with eating if necessary   - Allow adequate time for meals  - Recommend/ encourage appropriate diets, oral nutritional supplements, and vitamin/mineral supplements  - Order, calculate, and assess calorie counts as needed  - Recommend, monitor, and adjust tube feedings and TPN/PPN based on assessed needs  - Assess need for intravenous fluids  - Provide specific nutrition/hydration education as appropriate  - Include patient/family/caregiver in decisions related to nutrition  Outcome: Progressing

## 2023-11-29 NOTE — ASSESSMENT & PLAN NOTE
Noted overnight after SADAF protocol ordered  S/p straight cath x3--ahn will be placed now per protocol  Never noted prior per daughter, given above will check CT Lumbar spine.  Cannot do MRI due to prior SCS

## 2023-11-29 NOTE — OCCUPATIONAL THERAPY NOTE
Occupational Therapy Evaluation     Patient Name: Arnulfo GAMBOA Date: 11/29/2023  Problem List  Principal Problem:    Stroke-like symptoms  Active Problems:    Type 2 diabetes mellitus (HCC)    Chronic pain disorder    Hypertension    Iron deficiency anemia secondary to inadequate dietary iron intake    Anxiety    Hypercholesterolemia    Hypothyroidism    Overactive bladder    S/P insertion of spinal cord stimulator    Chronic obstructive pulmonary disease, unspecified COPD type ([address])    History of lumbar surgery    SADAF (acute kidney injury) ([address])    Tremor    Exertional shortness of breath    Urinary retention    Past Medical History  Past Medical History:   Diagnosis Date    Acid reflux     Anemia     hx of iron-deficient    Anxiety     Arthritis     Asthma     last needed inhaler last year 2020    Basal cell carcinoma     upper lip    Chronic narcotic dependence (HCC)     Chronic pain     Colon polyp     Cystocele     Diabetes mellitus ([address])     stable    Disease of thyroid gland     hypothyroidism    Diverticulosis     Dizziness     at times    Dysfunctional uterine bleeding     last assessed - [id number]    Dysphagia     Fibromyalgia     Gastric ulcer     Gastroparesis     History of colonic polyps     last assessed - [id number]    History of gastroesophageal reflux (GERD)     Hypercholesterolemia     Hyperlipidemia     Hypertension     IBS (irritable bowel syndrome)     Pneumobilia 06/18/2022    Post laminectomy syndrome     S/P insertion of spinal cord stimulator 07/18/2018    Seasonal allergies     Shortness of breath     exertional    Spinal stenosis     Status post lumbar spinal fusion 03/16/2018    Stroke [facility])     pt states slight stroke March 2022     Past Surgical History  Past Surgical History:   Procedure Laterality Date    APPENDECTOMY      BACK SURGERY      BREAST CYST EXCISION Left     CARDIAC CATHETERIZATION  11/14/2023    Procedure: Cardiac catheterization;  Surgeon: Annabelle Mejia Donovan Rogel MD;  Location: [facility] CARD[facility] LAB; Service: Cardiology    CARDIAC CATHETERIZATION N/A 11/14/2023    Procedure: Cardiac Coronary Angiogram;  Surgeon: Yann Todd MD;  Location: [facility] CARD[facility]AC CATH [facility]; Service: Cardiology    CHOLECYSTECTOMY      COLONOSCOPY      ESOPHAGOGASTRODUODENOSCOPY N/A 09/28/2016    Procedure: ESOPHAGOGASTRODUODENOSCOPY (EGD); Surgeon: Gilmer Mcgill MD;  Location: [facility] GI LAB; Service:     HERNIA REPAIR      HYSTERECTOMY      TTAH-BSO age 25    LAMINECTOMY      LUMBAR LAMINECTOMY      OOPHORECTOMY      age 25    OH ARTHRODESIS POSTERIOR/PSTLAT TQ 1NTRSPC THORACIC N/A 06/04/2018    Procedure: Reopening of lumbar incision for T12-L5 posterior instrumented fixation and fusion and T12-L4 posterior decompression;  Surgeon: Estela Foster MD;  Location: [facility] MAIN OR;  Service: Neurosurgery    OH COLONOSCOPY FLX DX W/COLLJ SPEC WHEN PFRMD N/A 03/02/2016    Procedure: EGD AND COLONOSCOPY;  Surgeon: Gilmer Mcgill MD;  Location: [facility] GI LAB; Service: Gastroenterology    OH DILATION [address] [facility] UNGUIDED SOUND/BOUGIE 1/MULT PASS N/A 09/28/2016    Procedure: DILATATION ESOPHAGEAL;  Surgeon: Gilmer Mcgill MD;  Location: [facility] GI LAB; Service: Gastroenterology    OH ESOPHAGOGASTRODUODENOSCOPY TRANSORAL DIAGNOSTIC N/A 07/18/2016    Procedure: ESOPHAGOGASTRODUODENOSCOPY (EGD); Surgeon: Gilmer Mcgill MD;  Location: [facility] GI LAB; Service: Gastroenterology    OH INSJ/RPLCMT SPI NPGR DIR/INDUXIVE COUPLING Left 06/04/2018    Procedure: removal of left buttock implantable pulse generator and placement of new  implantable pulse generator;  Surgeon: Estela Foster MD;  Location: [facility] MAIN [facility];  Service: Neurosurgery         11/29/23 1130   OT Last Visit   OT Visit Date 11/29/23   Note Type   Note type Evaluation   Pain Assessment   Pain Assessment Tool 0-10   Pain Score No Pain   Patient's Stated Pain Goal No pain   Hospital Pain Intervention(s) Repositioned; Ambulation/increased activity; Emotional support Restrictions/Precautions   Weight Bearing Precautions Per Order No   Other Precautions Cognitive; Chair Alarm; Bed Alarm;Multiple lines; Fall Risk;Pain   Home Living   Type of [facility] [address] One level;Stairs to enter with rails  (4 ELLYN)   Bathroom Shower/Tub Tub/shower unit   Bathroom Toilet Standard   Bathroom Accessibility Accessible   Home Equipment   (ROLLATOR)   Additional Comments PT IS FROM THE ABOVE HOME SET-UP AT BASELINE HOWEVER ADMITTED FROM [facility] REHAB WHERE SHE WAS RECEIVING THERAPY SERVICES PTA. + USE OF ROLLATOR AT BASELINE   Prior Function   Level of [place] Independent with ADLs; Independent with functional mobility; Needs assistance with IADLS   Lives With Son   Receives Help From Family;Home health   IADLs Family/Friend/Other provides medication management; Family/Friend/Other provides meals; Family/Friend/Other provides transportation   Falls in the last 6 months (S)  5 to 10   Vocational Retired   Lifestyle   [employer] PT [address] [facility] IN THE SHOWER FROM [facility] 2X/WK. Reciprocal Relationships FROM HOME WITH SON. A 2X/WK. LOCAL SUPPORTIVE DAUGHTER. 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[place] [address] ENJOYS WATCHING TV   ADL   Eating Assistance 5  Supervision/Setup   Grooming Assistance 5  Supervision/Setup   UB Bathing Assistance 4  Minimal Assistance   LB Bathing Assistance 2  Maximal Assistance   UB Dressing Assistance 4  Minimal Assistance   LB Dressing Assistance 2  Maximal Assistance   Toileting Assistance  2  Maximal Assistance   Functional Assistance 2  Maximal Assistance   Bed Mobility   Supine to Sit 3  Moderate assistance   Additional items Assist x 2; Increased time required;Verbal cues;LE management   Sit to Supine Unable to assess  (PT LEFT OOB WITH ALL NEEDS IN REACH + ALARM ON)   Transfers   Sit to Stand 3  Moderate assistance   Additional items Assist x 2;Increased time required;Verbal cues   Stand to Sit 3  Moderate assistance   Additional items Assist x 2; Increased time required;Verbal cues   Functional Mobility   Functional Mobility 3  Moderate assistance   Additional Comments AX2 FOR LIMITED MOBILITY WITH USE OF RW   Additional items Rolling walker   Balance   Static Sitting Fair   Static Standing Poor +   Ambulatory Poor   Activity Tolerance   Activity Tolerance Patient limited by fatigue;Patient limited by pain   Medical Staff Made Aware PT SEEN FOR CO-SESSION WITH SKILLED PHYSICAL THERAPIST 2' CLINICALLY UNSTABLE PRESENTATION, NEW PRECAUTIONS/LIMITATIONS, LIMITED ACTIVITY TOLERANCE AND PRESENT IMPAIRMENTS WHICH ARE A REGRESSION FROM THE PT'S BASELINE AND IMPACTING OVERALL OCCUPATIONAL PERFORMANCE. Nurse Made Aware APPROPRIATE TO SEE   RUE Assessment   RUE Assessment WFL   LUE Assessment   LUE Assessment WFL  (MINIMAL L SIDED WEAKNESS)   Hand Function   Gross Motor Coordination Functional   Fine Motor Coordination Functional   Sensation   Light Touch Partial deficits in the LLE   Perception   Inattention/Neglect Appears intact   Motor Planning Appears intact   Perseveration Not present   Cognition   Overall Cognitive Status WFL   Arousal/Participation Alert; Cooperative   Attention Within functional limits   Orientation Level Oriented X4   Memory Decreased short term memory   Following Commands Follows one step commands without difficulty   Comments PT IS PLEASANT AND COOPERATIVE. ALARM ON FOR SAFETY. Assessment   Limitation Decreased ADL status; Decreased Safe judgement during ADL;Decreased cognition;Decreased endurance;Decreased self-care trans;Decreased high-level ADLs   Prognosis Good   Assessment 77 YO Female SEEN FOR INITIAL OCCUPATIONAL THERAPY EVALUATION FOLLOWING ADMISSION TO [facility] WITH STROKE-LIKE SYMPTOMS. CTH (-). CTA showed Stable moderate stenosis at the distal right MCA M1 segment compared to the prior 10/17/2023 examination. Unchanged focal moderate stenosis involving the inferior basilar artery with infundibular dilatation at the right AICA origin. 2. CTA neck: Moderate right extracranial ICA stenosis. The cervical vertebral arteries are patent. UNABLE TO OBTAIN MRI. PROBLEMS LIST/PMH INCLUDES Acid reflux, Anemia, Anxiety, Arthritis, Asthma, Basal cell carcinoma, Chronic narcotic dependence ([address]), Chronic pain, Colon polyp, Cystocele, Diabetes mellitus ([address]), Disease of thyroid gland, Diverticulosis, Dizziness, Dysfunctional uterine bleeding, Dysphagia, Fibromyalgia, Gastric ulcer, Gastroparesis, History of colonic polyps, History of gastroesophageal reflux (GERD), Hypercholesterolemia, Hyperlipidemia, Hypertension, IBS (irritable bowel syndrome), Pneumobilia, Post laminectomy syndrome, S/P insertion of spinal cord stimulator, Seasonal allergies, Shortness of breath, Spinal stenosis, Status post lumbar spinal fusion, and Stroke ([address]). PT IS FROM HOME WITH SON AT BASELINE WHERE SHE REPORTS BEING INDEPENDENT WITH ADLS WITH ASSIST FOR BATHING FROM [facility] 2X/WK. PT ADMITTED FROM [facility] REHAB WHERE SHE WAS RECEIVING IN REHAB SERVICES AND REQUIRING ADDITIONAL ASSIST. PT CURRENTLY REQUIRES OVERALL MIN A WITH UB ADLS, MAX A WITH LB ADLS, AND MOD A X2 WITH TRANSFERS / LIMITED FUNCTIONAL MOBILITY WITH USE OF RW. PT IS LIMITED 2' FATIGUE, IMPAIRED BALANCE, FALL RISK , OVERALL WEAKNESS/DECONDITIONING , MILD LUE WEAKNESS, MILD LLE SENSORY DEFICITS, LLE TREMORS NOTED WITH ACTIVITY, DIZZINESS WITH CHANGE OF POSITIONING , INACCESSIBLE HOME ENVIRONMENT, and OVERALL LIMITED ACTIVITY TOLERANCE. PT EDUCATED ON DEEP BREATHING TECHNIQUES T/O ACTIVITY, SLOWING OF PACE, ENERGY CONSERVATION TECHNIQUES FOR CARRY OVER UPON D/C, and CONTINUE PARTICIPATION IN SELF-CARE/MOBILITY WITH STAFF WHILE IN THE HOSPITAL . The patient's raw score on the AM-PAC Daily Activity Inpatient Short Form is 14.  A raw score of less than 19 suggests the patient may benefit from discharge to post-acute rehabilitation services. Please refer to the recommendation of the Occupational Therapist for safe discharge planning. FROM AN OCCUPATIONAL THERAPY PERSPECTIVE, RECOMMEND LEVEL II RESOURCES UPON D/C. WILL CONT TO FOLLOW TO ADDRESS THE BELOW DESCRIBED GOALS. Goals   Patient Goals TO GET BETTER   LTG Time Frame 10-14   Long Term Goal #1 SEE BELOW   Plan   Treatment Interventions ADL retraining;Functional transfer training; Endurance training;Patient/family training;Cognitive reorientation;Equipment evaluation/education; Compensatory technique education; Energy conservation; Activityengagement   Goal Expiration Date 12/13/23   OT Frequency 2-3x/wk   Discharge Recommendation   Rehab Resource Intensity Level, OT II (Moderate Resource Intensity)   AM-PAC Daily Activity Inpatient   Lower Body Dressing 2   Bathing 2   Toileting 2   Upper Body Dressing 3   Grooming 3   Eating 2   Daily Activity Raw Score 14   Daily Activity Standardized Score (Calc for Raw Score >=11) 33.39   AM-PAC Applied Cognition Inpatient   Following a Speech/Presentation 4   Understanding Ordinary Conversation 4   Taking Medications 3   Remembering Where Things Are Placed or Put Away 3   Remembering List of 4-5 Errands 3   Taking Care of Complicated Tasks 3   Applied Cognition Raw Score 20   Applied Cognition Standardized Score 41.76       OCCUPATIONAL THERAPY GOALS TO BE MET WITHIN 14 DAYS:    -Pt will increase bed mobility to MOD I to participate in functional activities with G tolerance and balance.   -Pt will improve functional mobility and transfers to MOD I on/off all surfaces w/ G balance/safety including toileting.  -Pt will increase independence in all ADLS to MOD I with G balance sitting upright in chair.  -Pt will improve activity tolerance to G for 20 min txment sessions w/ G carry over of learned energy conservation techniques.  -Pt will demonstrate G carryover of learned safety techniques and proper body mechanics in functional and leisure activities with use of DME.  -Pt will complete additional cognitive assessment with 100% attention to task in order to assist with safe d/c plan.        Documentation completed by Kim Ashraf, [facility], R/[facility] OF THE [facility] Certified ID# [id number]

## 2023-11-29 NOTE — ASSESSMENT & PLAN NOTE
Presented as a prehospital stroke alert on 11/26 for left jaw numbness, left arm numbness and left facial droop. Patietn NIHSS 2 due to L facial droop and LLE drift. Was not a thrombolytic candidate secondary to last unknown well time. CT head without any acute abnormalities  CTA showed Stable moderate stenosis at the distal right MCA M1 segment compared to the prior 10/17/2023 examination. Unchanged focal moderate stenosis involving the inferior basilar artery with infundibular dilatation at the right AICA origin. 2. CTA neck: Moderate right extracranial ICA stenosis. The cervical vertebral arteries are patent.   Seen by neurology, unable to have MRI due to spinal cord stimulator repeat head CT stable  Echo deferred as recently completed  Status post Plavix load x 1, recommendation by neurology to continue Plavix 75 mg daily along with aspirin 81 mg daily for 90 days then DC Plavix and continue on aspirin monotherapy  Continue statin  Needs outpatient Zio patch/loop recorder  Recommending outpatient evaluation of her tremor once metabolic derangements are corrected  PT/OT recommending rehab

## 2023-11-29 NOTE — ASSESSMENT & PLAN NOTE
Recently admitted to [facility] for this approx 2 weeks ago. Was seen by cardiology, felt not to be heart failure, more likely hypovolemic given SADAF.   VQ scan was negative, echo reviewed EF 05%, grade 1 diastolic dysfunction and no significant valvular disease  Monitor O2 sats with ambulation

## 2023-11-29 NOTE — ASSESSMENT & PLAN NOTE
Blood pressure relatively well-controlled, continue Toprol-XL 50 mg twice daily, this was recently increased during prior admission as she was taken off lisinopril  Apparently was previously on HCTZ as well--should remain off

## 2023-11-29 NOTE — ASSESSMENT & PLAN NOTE
Unclear details but last imaging in system CT thoracic and lumbar spine wo contrast 4/19/2018 showed remote L4-L5 fusion, accomplished with dual threaded metallic interbody cages inserted posteriorly through wide bilateral laminectomy defects, no residual stenosis. Critical canal stenosis L3-L4, severe canal stenosis T12-L1, L1-2, and L2-3.   May require neurosurgery eval

## 2023-11-29 NOTE — ASSESSMENT & PLAN NOTE
Lab Results   Component Value Date    HGBA1C 6.5 (H) 11/26/2023       Recent Labs     11/28/23  1115 11/28/23  1808 11/28/23 2049 11/29/23  0946   POCGLU 187* 189* 184* 176*         Blood Sugar Average: Last 72 hrs:  (P) 147.5    Well-controlled as A1c is 6.5, patient takes metformin 1000 mg twice daily as an outpatient  Metformin currently held, on sliding scale insulin  Likely can resume metformin at discharge

## 2023-11-29 NOTE — PHYSICAL THERAPY NOTE
PHYSICAL THERAPY EVALUATION          Patient Name: Manuel Mcclellan  [id number] Date: 11/29/2023 11/29/23 1128   PT Last Visit   PT Visit Date 11/29/23   Note Type   Note type Evaluation   Pain Assessment   Pain Assessment Tool 0-10   Pain Score No Pain   Patient's Stated Pain Goal No pain   Hospital Pain Intervention(s) Repositioned; Ambulation/increased activity   Restrictions/Precautions   Weight Bearing Precautions Per Order No   Other Precautions Chair Alarm; Bed Alarm;Multiple lines; Fall Risk;Pain   Home Living   Type of [address] [place] [address] One level;Stairs to enter without rails; Able to live on main level with bedroom/bathroom; Performs ADLs on one level  (4 ELLYN)   Bathroom Shower/Tub Tub only   Bathroom Toilet Standard   Bathroom Equipment   (No bathroom equipment noted)   [address] [place] (Comment)  (Rollator)   Additional Comments Pt noted she has home health aides that visit. Prior Function   Level of [place] Independent with functional mobility; Independent with ADLs; Needs assistance with IADLS   Lives With General Fuse Help From Family;Home health   IADLs Family/Friend/Other provides transportation; Family/Friend/Other provides meals; Family/Friend/Other provides medication management   Falls in the last 6 months (S)  5 to 10   Vocational Retired   Comments Pt notes using a Rollator at baseline. At baseline, patient lives in the above setup. Pt is currently receiving rehab at [facility] where she was at for a week prior to admission to [facility].    General   Family/Caregiver Present No   Cognition   Overall Cognitive Status WFL   Arousal/Participation Responsive   Orientation Level Oriented X4   Memory Decreased short term memory   Following Commands Follows one step commands without difficulty   Comments Pt pleasant and cooperative to PT evaluation   Subjective   Subjective I feel worse then where I was at   RLE Assessment   RLE Assessment X   Strength RLE   RLE Overall Strength 4-/5   LLE Assessment   LLE Assessment X   Strength LLE   LLE Overall Strength 3+/5   Light Touch   RLE Light Touch Grossly intact   LLE Light Touch Grossly intact   Bed Mobility   Rolling R 3  Moderate assistance   Additional items Assist x 2; Increased time required;Verbal cues;LE management   Rolling L Unable to assess   Supine to Sit 3  Moderate assistance   Additional items Assist x 2; Increased time required;Verbal cues;LE management   Sit to Supine Unable to assess   Transfers   Sit to Stand 3  Moderate assistance   Additional items Assist x 2; Increased time required;Verbal cues   Stand to Sit 3  Moderate assistance   Additional items Assist x 2; Increased time required;Verbal cues   Additional Comments Transfers with RW   Ambulation/Elevation   Gait pattern Antalgic;Narrow RUBIN; Forward Flexion; Short stride; Step to;Excessively slow;Decreased foot clearance   Gait Assistance 3  Moderate assist   Additional items Assist x 2;Verbal cues; Tactile cues   Assistive Device Rolling walker   Distance 4'   Stair Management Assistance Not tested   Balance   Static Sitting Fair   Dynamic Sitting Fair -   Static Standing Poor +   Dynamic Standing Poor +   Ambulatory Poor   Endurance Deficit   Endurance Deficit Yes   Endurance Deficit Description Fatigue   Activity Tolerance   Activity Tolerance Patient tolerated treatment well;Patient limited by fatigue   Medical Staff Made Aware OT Mateo,PT    Nurse Made Aware RN cleared pt for PT evaluation   Assessment   Prognosis Fair   Problem List Decreased strength;Decreased range of motion;Decreased endurance; Impaired balance;Decreased mobility; Decreased coordination;Pain   Assessment Pt is a 78 y.o female [facility] with stroke like symptoms being seen for a PT evaluation 11/29/2023.  Pertinent PMH includes anemia, anxiety, arthritis, basal cell carcinoma in upper lip, chronic narcotic dependence, chronic pain, DM (unstable), dizziness, diverticulosis, fibromyalgia, GERD, HTN, hyperlipidemia, IBS, and gastroparesis. Pt is a high complexity evaluation due to decreased functional mobility compared to baseline, extensive PMH, increased risk of falls, and tremor in LLE. Pt required mod a x 2 for bed mobility, transfers, and gait requiring VC for proper hand placement with transfers. Pt ambulated 4' with the RW demonstrating narrow RUBIN, forward flexed posture, shortened stride length, increased UE dependence on AD, and decreased foot clearance. Important PT findings include dec B/L LE strength and power L>R, impaired endurance, impaired balance, and impaired coordination. Pt received an [facility] basic mobility inpatient raw score of 11. PT d/c recommendation at this time is return to rehab pending medical clearance. Pt will continue to benefit from skilled PT during the remaining hospital stay to address these impairments. Pt left in the recliner chair at bedside with all needs within reach and the chair alarm activated. Goals   Patient Goals to get better and go home   STG Expiration Date 12/09/23   Short Term Goal #1 In 10 days, pt will be able to 1) perform all bed mobility with supervision to improve functional independence. 2) perform all transfers with supervision to improve overall independence. 3) improve general LE strength by 1/2 to improve transfers and ambulation. 4) improve overall balance by 1/2 to reduce risk of falls. 5) ambulate 100' with LRAD requiring supervision to improve LE strength and overall function. PT Treatment Day 0   Plan   Treatment/Interventions ADL retraining;LE strengthening/ROM; Functional transfer training; Therapeutic exercise; Endurance training;Patient/family training;Equipment eval/education; Bed mobility;Gait training; Compensatory technique education;Spoke to MD;Spoke to nursing;Spoke to case management;Spoke to advanced practitioner;OT;Family   PT Frequency 3-5x/wk   Discharge Recommendation   Rehab Resource Intensity Level, PT II (Moderate Resource Intensity)   Equipment Recommended Walker   Walker Package Recommended Wheeled walker   AM-PAC Basic Mobility Inpatient   Turning in Flat Bed Without Bedrails 2   Lying on Back to Sitting on Edge of Flat Bed Without Bedrails 2   Moving Bed to Chair 2   Standing Up From Chair Using Arms 2   Walk in Room 2   Climb 3-5 Stairs With Railing 1   Basic Mobility Inpatient Raw Score 11   Basic Mobility Standardized Score 30.25   Highest Level Of Mobility   -HLM Goal 4: Move to chair/commode   -HLM Achieved 4: Move to chair/commode   Modified [place] Scale   Modified [place] Scale 4   Barthel Index   Feeding 5   Bathing 0   Grooming Score 0   Dressing Score 5   Bladder Score 10   Bowels Score 10   Toilet Use Score 5   Transfers (Bed/Chair) Score 5   Mobility (Level Surface) Score 10   Stairs Score 0   Barthel Index Score 50   End of Consult   Patient Position at End of Consult Bedside chair;Bed/Chair alarm activated; All needs within reach   Alem Welch, SPT

## 2023-11-29 NOTE — UTILIZATION REVIEW
Date: 11/28    Day 3: Has surpassed a 2nd midnight with active treatments and services, which include ongoing workup and treat for Stroke like symptoms and SADAF.

## 2023-11-30 ENCOUNTER — TELEPHONE (OUTPATIENT)
Dept: OTHER | Facility: HOSPITAL | Age: 78
End: 2023-11-30

## 2023-11-30 ENCOUNTER — APPOINTMENT (INPATIENT)
Dept: RADIOLOGY | Facility: HOSPITAL | Age: 78
DRG: 683 | End: 2023-11-30
Payer: MEDICARE

## 2023-11-30 LAB
ALBUMIN UR ELPH-MCNC: 100 %
ALPHA1 GLOB MFR UR ELPH: 0 %
ALPHA2 GLOB MFR UR ELPH: 0 %
ANION GAP SERPL CALCULATED.3IONS-SCNC: 5 MMOL/L
B-GLOBULIN MFR UR ELPH: 0 %
BUN SERPL-MCNC: 20 MG/DL (ref 5–25)
CALCIUM SERPL-MCNC: 8.5 MG/DL (ref 8.4–10.2)
CHLORIDE SERPL-SCNC: 106 MMOL/L (ref 96–108)
CO2 SERPL-SCNC: 26 MMOL/L (ref 21–32)
CREAT SERPL-MCNC: 1.28 MG/DL (ref 0.6–1.3)
GAMMA GLOB MFR UR ELPH: 0 %
GFR SERPL CREATININE-BSD FRML MDRD: 39 ML/MIN/1.73SQ M
GLUCOSE SERPL-MCNC: 120 MG/DL (ref 65–140)
GLUCOSE SERPL-MCNC: 121 MG/DL (ref 65–140)
GLUCOSE SERPL-MCNC: 140 MG/DL (ref 65–140)
GLUCOSE SERPL-MCNC: 149 MG/DL (ref 65–140)
GLUCOSE SERPL-MCNC: 174 MG/DL (ref 65–140)
POTASSIUM SERPL-SCNC: 4.4 MMOL/L (ref 3.5–5.3)
PROT PATTERN UR ELPH-IMP: NORMAL
PROT UR-MCNC: 7.5 MG/DL
SODIUM SERPL-SCNC: 137 MMOL/L (ref 135–147)

## 2023-11-30 PROCEDURE — 99232 SBSQ HOSP IP/OBS MODERATE 35: CPT | Performed by: INTERNAL MEDICINE

## 2023-11-30 PROCEDURE — 84165 PROTEIN E-PHORESIS SERUM: CPT | Performed by: PHYSICIAN ASSISTANT

## 2023-11-30 PROCEDURE — 76775 US EXAM ABDO BACK WALL LIM: CPT

## 2023-11-30 PROCEDURE — 97535 SELF CARE MNGMENT TRAINING: CPT

## 2023-11-30 PROCEDURE — 83521 IG LIGHT CHAINS FREE EACH: CPT | Performed by: PHYSICIAN ASSISTANT

## 2023-11-30 PROCEDURE — 80048 BASIC METABOLIC PNL TOTAL CA: CPT | Performed by: PHYSICIAN ASSISTANT

## 2023-11-30 PROCEDURE — 84166 PROTEIN E-PHORESIS/URINE/CSF: CPT | Performed by: PATHOLOGY

## 2023-11-30 PROCEDURE — 86334 IMMUNOFIX E-PHORESIS SERUM: CPT | Performed by: PHYSICIAN ASSISTANT

## 2023-11-30 PROCEDURE — 99222 1ST HOSP IP/OBS MODERATE 55: CPT | Performed by: UROLOGY

## 2023-11-30 PROCEDURE — 82948 REAGENT STRIP/BLOOD GLUCOSE: CPT

## 2023-11-30 RX ORDER — METHOCARBAMOL 500 MG/1
500 TABLET, FILM COATED ORAL EVERY 6 HOURS PRN
Status: DISCONTINUED | OUTPATIENT
Start: 2023-11-30 | End: 2023-12-01 | Stop reason: HOSPADM

## 2023-11-30 RX ADMIN — FERROUS SULFATE TAB 325 MG (65 MG ELEMENTAL FE) 325 MG: 325 (65 FE) TAB at 08:44

## 2023-11-30 RX ADMIN — ACETAMINOPHEN 650 MG: 325 TABLET, FILM COATED ORAL at 00:21

## 2023-11-30 RX ADMIN — RANOLAZINE 500 MG: 500 TABLET, FILM COATED, EXTENDED RELEASE ORAL at 21:32

## 2023-11-30 RX ADMIN — ATORVASTATIN CALCIUM 40 MG: 40 TABLET, FILM COATED ORAL at 16:20

## 2023-11-30 RX ADMIN — OXYBUTYNIN 10 MG: 10 TABLET, FILM COATED, EXTENDED RELEASE ORAL at 08:43

## 2023-11-30 RX ADMIN — IRON SUCROSE 300 MG: 20 INJECTION, SOLUTION INTRAVENOUS at 12:34

## 2023-11-30 RX ADMIN — ACETAMINOPHEN 650 MG: 325 TABLET, FILM COATED ORAL at 22:54

## 2023-11-30 RX ADMIN — RANOLAZINE 500 MG: 500 TABLET, FILM COATED, EXTENDED RELEASE ORAL at 08:44

## 2023-11-30 RX ADMIN — OXYCODONE HYDROCHLORIDE AND ACETAMINOPHEN 2 TABLET: 5; 325 TABLET ORAL at 06:16

## 2023-11-30 RX ADMIN — METOPROLOL SUCCINATE 50 MG: 50 TABLET, EXTENDED RELEASE ORAL at 21:30

## 2023-11-30 RX ADMIN — METOPROLOL SUCCINATE 50 MG: 50 TABLET, EXTENDED RELEASE ORAL at 08:43

## 2023-11-30 RX ADMIN — OXYCODONE HYDROCHLORIDE AND ACETAMINOPHEN 2 TABLET: 5; 325 TABLET ORAL at 16:23

## 2023-11-30 RX ADMIN — METHOCARBAMOL 500 MG: 500 TABLET ORAL at 23:16

## 2023-11-30 RX ADMIN — LIDOCAINE 1 PATCH: 50 PATCH CUTANEOUS at 08:43

## 2023-11-30 RX ADMIN — HEPARIN SODIUM 5000 UNITS: 5000 INJECTION INTRAVENOUS; SUBCUTANEOUS at 05:03

## 2023-11-30 RX ADMIN — PANTOPRAZOLE SODIUM 40 MG: 40 TABLET, DELAYED RELEASE ORAL at 08:43

## 2023-11-30 RX ADMIN — HEPARIN SODIUM 5000 UNITS: 5000 INJECTION INTRAVENOUS; SUBCUTANEOUS at 21:30

## 2023-11-30 RX ADMIN — LATANOPROST 1 DROP: 50 SOLUTION OPHTHALMIC at 21:31

## 2023-11-30 RX ADMIN — LEVOTHYROXINE SODIUM 37.5 MCG: 75 TABLET ORAL at 08:43

## 2023-11-30 RX ADMIN — HEPARIN SODIUM 5000 UNITS: 5000 INJECTION INTRAVENOUS; SUBCUTANEOUS at 13:06

## 2023-11-30 NOTE — ASSESSMENT & PLAN NOTE
Recently admitted to [facility] for this approx 2 weeks ago. Was seen by cardiology, felt not to be heart failure, more likely hypovolemic given SADAF.   VQ scan was negative, echo reviewed EF 48%, grade 1 diastolic dysfunction and no significant valvular disease  Monitor O2 sats with ambulation

## 2023-11-30 NOTE — ASSESSMENT & PLAN NOTE
POA creatinine 1.58, baseline previously appears to be around 0.8-1.0 however during recent hospital stay did have SADAF with peak of 1.40. Suspected related to IV contrast on admission, decreased PO intake as well as urinary retention as below  During recent admission was taken off lisinopril indefinitely.  Daughter states was previously on HCTZ as well (not on med rec tho?)--continue to hold  [place] now placed, see below  Avoid hypotension  S/p IVF  Renal consulted, input appreciated  As long as creatinne remains stable 12/1 should be good for discharge

## 2023-11-30 NOTE — ASSESSMENT & PLAN NOTE
Presented as a prehospital stroke alert on 11/26 for left jaw numbness, left arm numbness and left facial droop. Patietn NIHSS 2 due to L facial droop and LLE drift. Was not a thrombolytic candidate secondary to last unknown well time. CT head without any acute abnormalities  CTA showed Stable moderate stenosis at the distal right MCA M1 segment compared to the prior 10/17/2023 examination. Unchanged focal moderate stenosis involving the inferior basilar artery with infundibular dilatation at the right AICA origin. 2. CTA neck: Moderate right extracranial ICA stenosis. The cervical vertebral arteries are patent.   Seen by neurology, unable to have MRI due to spinal cord stimulator repeat head CT stable  Echo deferred as recently completed  Status post Plavix load x 1, recommendation by neurology to continue Plavix 75 mg daily along with aspirin 81 mg daily for 90 days then DC Plavix and continue on aspirin monotherapy  Continue statin  Needs outpatient Zio patch/loop recorder arranged via PCP  Neuro recommending outpatient evaluation of her tremor once metabolic derangements are corrected  PT/OT recommending rehab

## 2023-11-30 NOTE — ASSESSMENT & PLAN NOTE
Noted upon examination.  Neurology evaluated, unclear if related to metabolic derangements, however patient reportedly does have a family history of tremor  Neurology is recommending outpatient workup for this after SADAF is resolved   Appears mildly improved overall  Lumbar CT reviewed with Nsx as above

## 2023-11-30 NOTE — PLAN OF CARE
Problem: Potential for Falls  Goal: Patient will remain free of falls  Description: INTERVENTIONS:  - Educate patient/family on patient safety including physical limitations  - Instruct patient to call for assistance with activity   - Consult OT/PT to assist with strengthening/mobility   - Keep Call bell within reach  - Keep bed low and locked with side rails adjusted as appropriate  - Keep care items and personal belongings within reach  - Initiate and maintain comfort rounds  - Make Fall Risk Sign visible to staff  - Offer Toileting every 2 Hours, in advance of need  - Initiate/Maintain alarm  - Obtain necessary fall risk management equipment:   - Apply yellow socks and bracelet for high fall risk patients  - Consider moving patient to room near nurses station  Outcome: Progressing     Problem: PAIN - ADULT  Goal: Verbalizes/displays adequate comfort level or baseline comfort level  Description: Interventions:  - Encourage patient to monitor pain and request assistance  - Assess pain using appropriate pain scale  - Administer analgesics based on type and severity of pain and evaluate response  - Implement non-pharmacological measures as appropriate and evaluate response  - Consider cultural and social influences on pain and pain management  - Notify physician/advanced practitioner if interventions unsuccessful or patient reports new pain  Outcome: Progressing     Problem: INFECTION - ADULT  Goal: Absence or prevention of progression during hospitalization  Description: INTERVENTIONS:  - Assess and monitor for signs and symptoms of infection  - Monitor lab/diagnostic results  - Monitor all insertion sites, i.e. indwelling lines, tubes, and drains  - Monitor endotracheal if appropriate and nasal secretions for changes in amount and color  - [place] appropriate cooling/warming therapies per order  - Administer medications as ordered  - Instruct and encourage patient and family to use good hand hygiene technique  - Identify and instruct in appropriate isolation precautions for identified infection/condition  Outcome: Progressing  Goal: Absence of fever/infection during neutropenic period  Description: INTERVENTIONS:  - Monitor WBC    Outcome: Progressing     Problem: SAFETY ADULT  Goal: Patient will remain free of falls  Description: INTERVENTIONS:  - Educate patient/family on patient safety including physical limitations  - Instruct patient to call for assistance with activity   - Consult OT/PT to assist with strengthening/mobility   - Keep Call bell within reach  - Keep bed low and locked with side rails adjusted as appropriate  - Keep care items and personal belongings within reach  - Initiate and maintain comfort rounds  - Make Fall Risk Sign visible to staff  - Offer Toileting every 2 Hours, in advance of need  - Initiate/Maintain alarm  - Obtain necessary fall risk management equipment:   - Apply yellow socks and bracelet for high fall risk patients  - Consider moving patient to room near nurses station  Outcome: Progressing  Goal: Maintain or return to baseline ADL function  Description: INTERVENTIONS:  -  Assess patient's ability to carry out ADLs; assess patient's baseline for ADL function and identify physical deficits which impact ability to perform ADLs (bathing, care of mouth/teeth, toileting, grooming, dressing, etc.)  - Assess/evaluate cause of self-care deficits   - Assess range of motion  - Assess patient's mobility; develop plan if impaired  - Assess patient's need for assistive devices and provide as appropriate  - Encourage maximum independence but intervene and supervise when necessary  - Involve family in performance of ADLs  - Assess for home care needs following discharge   - Consider OT consult to assist with ADL evaluation and planning for discharge  - Provide patient education as appropriate  Outcome: Progressing  Goal: Maintains/Returns to pre admission functional level  Description: INTERVENTIONS:  - Perform AM-PAC 6 Click Basic Mobility/ Daily Activity assessment daily.  - Set and communicate daily mobility goal to care team and patient/family/caregiver. - Collaborate with rehabilitation services on mobility goals if consulted  - Perform Range of Motion 3 times a day. - Reposition patient every 2 hours. - Dangle patient 3 times a day  - Stand patient 3 times a day  - Ambulate patient 3 times a day  - Out of bed to chair 3 times a day   - Out of bed for meals 3 times a day  - Out of bed for toileting  - Record patient progress and toleration of activity level   Outcome: Progressing     Problem: DISCHARGE PLANNING  Goal: Discharge to home or other facility with appropriate resources  Description: INTERVENTIONS:  - Identify barriers to discharge w/patient and caregiver  - Arrange for needed discharge resources and transportation as appropriate  - Identify discharge learning needs (meds, wound care, etc.)  - Arrange for interpretive services to assist at discharge as needed  - Refer to Case Management Department for coordinating discharge planning if the patient needs post-hospital services based on physician/advanced practitioner order or complex needs related to functional status, cognitive ability, or social support system  Outcome: Progressing     Problem: Knowledge Deficit  Goal: Patient/family/caregiver demonstrates understanding of disease process, treatment plan, medications, and discharge instructions  Description: Complete learning assessment and assess knowledge base. Interventions:  - Provide teaching at level of understanding  - Provide teaching via preferred learning methods  Outcome: Progressing     Problem: Nutrition/Hydration-ADULT  Goal: Nutrient/Hydration intake appropriate for improving, restoring or maintaining nutritional needs  Description: Monitor and assess patient's nutrition/hydration status for malnutrition.  Collaborate with interdisciplinary team and initiate plan and interventions as ordered. Monitor patient's weight and dietary intake as ordered or per policy. Utilize nutrition screening tool and intervene as necessary. Determine patient's food preferences and provide high-protein, high-caloric foods as appropriate.      INTERVENTIONS:  - Monitor oral intake, urinary output, labs, and treatment plans  - Assess nutrition and hydration status and recommend course of action  - Evaluate amount of meals eaten  - Assist patient with eating if necessary   - Allow adequate time for meals  - Recommend/ encourage appropriate diets, oral nutritional supplements, and vitamin/mineral supplements  - Order, calculate, and assess calorie counts as needed  - Recommend, monitor, and adjust tube feedings and TPN/PPN based on assessed needs  - Assess need for intravenous fluids  - Provide specific nutrition/hydration education as appropriate  - Include patient/family/caregiver in decisions related to nutrition  Outcome: Progressing     Problem: Prexisting or High Potential for Compromised Skin Integrity  Goal: Skin integrity is maintained or improved  Description: INTERVENTIONS:  - Identify patients at risk for skin breakdown  - Assess and monitor skin integrity  - Assess and monitor nutrition and hydration status  - Monitor labs   - Assess for incontinence   - Turn and reposition patient  - Assist with mobility/ambulation  - Relieve pressure over bony prominences  - Avoid friction and shearing  - Provide appropriate hygiene as needed including keeping skin clean and dry  - Evaluate need for skin moisturizer/barrier cream  - Collaborate with interdisciplinary team   - Patient/family teaching  - Consider wound care consult   Outcome: Progressing

## 2023-11-30 NOTE — ASSESSMENT & PLAN NOTE
Unclear details but last imaging in system CT thoracic and lumbar spine wo contrast 4/19/2018 showed remote L4-L5 fusion, accomplished with dual threaded metallic interbody cages inserted posteriorly through wide bilateral laminectomy defects, no residual stenosis. Critical canal stenosis L3-L4, severe canal stenosis T12-L1, L1-2, and L2-3. Obtained CT Lumbar spine as cannot have MRI due to SCS and reviewed findings with neurosurgery, appear stable.  No acute findings to explain retention

## 2023-11-30 NOTE — PLAN OF CARE
Problem: OCCUPATIONAL THERAPY ADULT  Goal: Performs self-care activities at highest level of function for planned discharge setting. See evaluation for individualized goals. Description: Treatment Interventions: ADL retraining, Functional transfer training, Endurance training, Patient/family training, Cognitive reorientation, Equipment evaluation/education, Compensatory technique education, Energy conservation, Activityengagement          See flowsheet documentation for full assessment, interventions and recommendations. Outcome: Progressing  Note: Limitation: Decreased ADL status, Decreased Safe judgement during ADL, Decreased cognition, Decreased endurance, Decreased self-care trans, Decreased high-level ADLs  Prognosis: Good  Assessment: pt participated in pm ot session and was seen focusing on adl tasks, sit to from stand functional transfers and functional mobility. pt tolerated session well. she was oob for most of day in chair. she was able to walk in room short distance with mod asst x 1 c chair follow using rw.  pt required proximal balance support. pt was ab;e tp mark underdear with max asst inclusing threading ahn catheter through pantleg. pt required min asst for ub dressing while seated and max asst for socks. pt perfomred arom b ue's with no c/o coordination deficit. wll continue to assess functionally. no tremor noted l ue or le this session. pt reports being at rehab for a week prior to admission. plan to continue to focus on goals from ie.      Rehab Resource Intensity Level, OT: II (Moderate Resource Intensity)     April A Storm

## 2023-11-30 NOTE — CASE MANAGEMENT
Case Management Discharge Planning Note    Patient name Mckenzie Mcfarland  Location [facility] 623/[facility] 603-19 MRN [id number]  : 1945 Date 2023       Current Admission Date: 2023  Current Admission Diagnosis:Stroke-like symptoms   Patient Active Problem List    Diagnosis Date Noted    Urinary retention 2023    SADAF (acute kidney injury) ([address]) 2023    Tremor 2023    Exertional shortness of breath 2023    Non obstructive CAD 11/15/2023    History of lumbar surgery 11/10/2023    Abnormal urinalysis 2023    Chronic obstructive pulmonary disease, unspecified COPD type ([address]) 2023    Confusion with body shakes and blank stare 2023    Normocytic anemia 2023    Bilateral lower extremity edema 2023    Cerebrovascular accident (CVA), unspecified mechanism ([address]) 2022    Acute kidney injury ([address]) 2022    Stroke-like symptoms 2022    Obesity, morbid ([address]) 2021    Prepyloric ulcer 2021    Diarrhea 2021    Mild intermittent asthma without complication     S/P insertion of spinal cord stimulator 2018    Iron deficiency anemia secondary to inadequate dietary iron intake 2018    Type 2 diabetes mellitus ([address])     Failed back surgical syndrome 2018    Hypothyroidism 2017    Degenerative lumbar spinal stenosis 2017    Glaucoma 2017    Overactive bladder 2016    Dyspepsia 2016    Hypercholesterolemia 2016    Anxiety 2015    Chronic pain disorder 2013    Hypertension 2013      LOS (days): 4  Geometric Mean LOS (GMLOS) (days): 3.10  Days to GMLOS:-0.9     OBJECTIVE:  Risk of Unplanned Readmission Score: 24.16         Current admission status: Inpatient   Preferred Pharmacy:   [address] [place] [address]  Phone: [phone] Fax: [phone]    [facility] [address] [facility]  [address] [facility] [address]  Phone: [phone] Fax: [phone]    [address] [place], [place] - [address]  [address]  [address] [address]  Phone: [phone] Fax: [phone]    [address]  [facility], [facility] [address]  [address] [place] [address]  Phone: [phone] Fax: [address] [facility], [address] [facility] [address]  [address]  [address] [facility] [address]  Phone: [phone] Fax: [phone]    Primary Care Provider: NANY Carrillo    Primary Insurance: [employer] [address] [facility]  Secondary Insurance:     DISCHARGE DETAILS:    Other Referral/Resources/Interventions Provided:  Interventions: Short Term Rehab  Referral Comments: This CM left VM for daughter, Marietta Damon her that patient will likely be discharged to [facility] tomorrow at [facility].  TC to [employer], left VM for [employer] regarding DCP    Transport at Discharge : [address]  Dispatcher Contacted: Yes  Number/Name of Dispatcher: SLETS  Transported by [employer] and Unit #): Kalina  ETA of Transport (Date): 11/30/23  ETA of Transport (Time): [phone]

## 2023-11-30 NOTE — TELEPHONE ENCOUNTER
Patient seen in consultation for urinary retention. Discontinued Sanctura and oxybutynin.   Please schedule outpatient follow-up and void trial

## 2023-11-30 NOTE — ASSESSMENT & PLAN NOTE
Noted after workup for SADAF. Etiology unclear.  ?neurogenic bladder in setting of DM  S/p straight cath x3--ahn placed 11/30 per protocol  Urology consulted, pending input   Cannot have MRI due to SCS  Reviewed lumbar CT findigns with neurosurgery, nothing acute to explain

## 2023-11-30 NOTE — ASSESSMENT & PLAN NOTE
Baseline appears between 8 and 9, unclear if this has been worked up in the outpatient setting   S/p IV venofer  SPEP, UPEP, Free light chains pending

## 2023-11-30 NOTE — ASSESSMENT & PLAN NOTE
CT 11/26: No hydronephrosis, distended urinary bladder, moderate fecal stasis  Creat 1.28, trending down from 1.66  Maintain Bateman catheter  Discontinue oxybutynin inpatient and outpatient sanctura  Daily bowel regimen to prevent constipation  Avoid medications that can cause urinary retention such as narcotics, anticholinergics, antihistamines etc.  Void trial when more ambulatory, no longer constipated and oxybutynin discontinued, this can be done in outpatient setting  Follow-up with urology outpatient

## 2023-11-30 NOTE — PROGRESS NOTES
[facility]  Progress Note  Name: Kitty Will  MRN: [id number]  Unit/Bed#: PPHP 511-02 I Date of Admission: 11/26/2023   Date of Service: 11/30/2023 I Hospital Day: 4    Assessment/Plan   * Stroke-like symptoms  Assessment & Plan  Presented as a prehospital stroke alert on 11/26 for left jaw numbness, left arm numbness and left facial droop. Patietn NIHSS 2 due to L facial droop and LLE drift. Was not a thrombolytic candidate secondary to last unknown well time. CT head without any acute abnormalities  CTA showed Stable moderate stenosis at the distal right MCA M1 segment compared to the prior 10/17/2023 examination. Unchanged focal moderate stenosis involving the inferior basilar artery with infundibular dilatation at the right AICA origin. 2. CTA neck: Moderate right extracranial ICA stenosis. The cervical vertebral arteries are patent. Seen by neurology, unable to have MRI due to spinal cord stimulator repeat head CT stable  Echo deferred as recently completed  Status post Plavix load x 1, recommendation by neurology to continue Plavix 75 mg daily along with aspirin 81 mg daily for 90 days then DC Plavix and continue on aspirin monotherapy  Continue statin  Needs outpatient Zio patch/loop recorder arranged via PCP  Neuro recommending outpatient evaluation of her tremor once metabolic derangements are corrected  PT/OT recommending rehab     Urinary retention  Assessment & Plan  Noted after workup for SADAF. Etiology unclear. ?neurogenic bladder in setting of DM  S/p straight cath x3--ahn placed 11/30 per protocol  Urology consulted, pending input   Cannot have MRI due to SCS  Reviewed lumbar CT findigns with neurosurgery, nothing acute to explain    Tremor  Assessment & Plan  Noted upon examination.  Neurology evaluated, unclear if related to metabolic derangements, however patient reportedly does have a family history of tremor  Neurology is recommending outpatient workup for this after SADAF is resolved   Appears mildly improved overall  Lumbar CT reviewed with Nsx as above     SADAF (acute kidney injury) ([address])  Assessment & Plan  POA creatinine 1.58, baseline previously appears to be around 0.8-1.0 however during recent hospital stay did have SADAF with peak of 1.40. Suspected related to IV contrast on admission, decreased PO intake as well as urinary retention as below  During recent admission was taken off lisinopril indefinitely. Daughter states was previously on HCTZ as well (not on med rec tho?)--continue to hold  Bateman now placed, see below  Avoid hypotension  S/p IVF  Renal consulted, input appreciated  As long as creatinne remains stable 12/1 should be good for discharge     History of lumbar surgery  Assessment & Plan  Unclear details but last imaging in system CT thoracic and lumbar spine wo contrast 4/19/2018 showed remote L4-L5 fusion, accomplished with dual threaded metallic interbody cages inserted posteriorly through wide bilateral laminectomy defects, no residual stenosis. Critical canal stenosis L3-L4, severe canal stenosis T12-L1, L1-2, and L2-3. Obtained CT Lumbar spine as cannot have MRI due to SCS and reviewed findings with neurosurgery, appear stable. No acute findings to explain retention    Exertional shortness of breath  Assessment & Plan  [facility] for this approx 2 weeks ago. Was seen by cardiology, felt not to be heart failure, more likely hypovolemic given SADAF.   VQ scan was negative, echo reviewed EF 35%, grade 1 diastolic dysfunction and no significant valvular disease  Monitor O2 sats with ambulation    Chronic obstructive pulmonary disease, unspecified COPD type ([address])  Assessment & Plan  Currently patient is not in any respiratory exacerbation  Albuterol as needed    S/P insertion of spinal cord stimulator  Assessment & Plan  Unclear details but not MRI compatible     Overactive bladder  Assessment & Plan  Continue home medication of oxybutynin 10 mg    Hypothyroidism  Assessment & Plan  TSH 3.889  Continue levothyroxine 37.5 mg daily  Follow up outpatient PCP     Hypercholesterolemia  Assessment & Plan  Continue statin therapy    Anxiety  Assessment & Plan  At baseline  Continue Lexapro  Supportive care     Iron deficiency anemia secondary to inadequate dietary iron intake  Assessment & Plan  Baseline appears between 8 and 9, unclear if this has been worked up in the outpatient setting   S/p IV venofer  SPEP, UPEP, Free light chains pending    Hypertension  Assessment & Plan  Blood pressure relatively well-controlled, continue Toprol-XL 50 mg twice daily, this was recently increased during prior admission as she was taken off lisinopril  Apparently was previously on HCTZ as well--should remain off     Chronic pain disorder  Assessment & Plan  Patient is on Percocet 10-3 25 every 12 hours as needed pain ( Pt usually takes it once per day if needed)   PDMP was reviewed by prior provider    Type 2 diabetes mellitus [facility])  Assessment & Plan  Lab Results   Component Value Date    HGBA1C 6.5 (H) 11/26/2023       Recent Labs     11/29/23  1652 11/29/23  2204 11/30/23  0737 11/30/23  1104   POCGLU 132 212* 140 121         Blood Sugar Average: Last 72 hrs:  (P) [id number]    Well-controlled as A1c is 6.5, patient takes metformin 1000 mg twice daily as an outpatient  Metformin currently held, on sliding scale insulin  Likely can resume metformin at discharge               VTE Pharmacologic Prophylaxis: VTE Score: 10 Moderate Risk (Score 3-4) - Pharmacological DVT Prophylaxis Ordered: heparin. Mobility:   Basic Mobility Inpatient Raw Score: 10  -HLM Goal: 4: Move to chair/commode  -HLM Achieved: 6: Walk 10 steps or more  HLM Goal achieved. Continue to encourage appropriate mobility. Patient Centered Rounds: I performed bedside rounds with nursing staff today.   Brennen Yeung RN  Discussions with Specialists or Other Care Team Provider: d/w renal, CM. [facility] [place] neurosurgery    Education and Discussions with Family / Patient: Updated  (daughter) via phone. Total Time Spent on Date of Encounter in care of patient: 45 mins. This time was spent on one or more of the following: performing physical exam; counseling and coordination of care; obtaining or reviewing history; documenting in the medical record; reviewing/ordering tests, medications or procedures; communicating with other healthcare professionals and discussing with patient's family/caregivers. Current Length of Stay: 4 day(s)  Current Patient Status: Inpatient   Certification Statement: The patient will continue to require additional inpatient hospital stay due to ensure stabilization of creatinine, IV iron, discharge planning   Discharge Plan: Anticipate discharge tomorrow to rehab facility. Code Status: Level 1 - Full Code    Subjective:   Feels well today. Was OOB in chair. Feels tremors are improved. Denies numbness other than in L foot. She is tolerating ahn, no blood at the moment. Breathing stable. Denies back pain. Appetite fair. Had BM yesterday. Objective:     Vitals:   Temp (24hrs), Av.9 °F (36.6 °C), Min:97.3 °F (36.3 °C), Max:98.8 °F (37.1 °C)    Temp:  [97.3 °F (36.3 °C)-98.8 °F (37.1 °C)] 97.8 °F (36.6 °C)  HR:  [69-82] 69  Resp:  [17-18] 18  BP: (124-152)/(61-73) 135/72  SpO2:  [95 %-96 %] 96 %  Body mass index is 33.41 kg/m². Input and Output Summary (last 24 hours): Intake/Output Summary (Last 24 hours) at 2023 1248  Last data filed at 2023 0501  Gross per 24 hour   Intake 1182.5 ml   Output 1275 ml   Net -92.5 ml       Physical Exam:   Physical Exam  Vitals and nursing note reviewed. Constitutional:       General: She is not in acute distress. Appearance: She is obese. Comments: On RA, oob in chair    Cardiovascular:      Rate and Rhythm: Normal rate. Pulmonary:      Effort: No respiratory distress.       Breath sounds: No wheezing. Abdominal:      General: Bowel sounds are normal. There is no distension. Palpations: Abdomen is soft. Genitourinary:     Comments: Bateman in place draining clear yellow urine   Musculoskeletal:      Right lower leg: No edema. Left lower leg: No edema. Skin:     Coloration: Skin is pale. Neurological:      Mental Status: She is oriented to person, place, and time. Cranial Nerves: No cranial nerve deficit. Motor: No weakness. Psychiatric:         Mood and Affect: Mood normal.          Additional Data:     Labs:  Results from last 7 days   Lab Units 11/29/23  0537   WBC Thousand/uL 6.46   HEMOGLOBIN g/dL 8.3*   HEMATOCRIT % 26.3*   PLATELETS Thousands/uL 209   NEUTROS PCT % 60   LYMPHS PCT % 23   MONOS PCT % 11   EOS PCT % 5     Results from last 7 days   Lab Units 11/30/23  0627 11/28/23  0514 11/27/23  0413   SODIUM mmol/L 137   < > 131*   POTASSIUM mmol/L 4.4   < > 5.0   CHLORIDE mmol/L 106   < > 97   CO2 mmol/L 26   < > 24   BUN mg/dL 20   < > 31*   CREATININE mg/dL 1.28   < > 1.52*   ANION GAP mmol/L 5   < > 10   CALCIUM mg/dL 8.5   < > 7.6*   ALBUMIN g/dL  --   --  3.1*   TOTAL BILIRUBIN mg/dL  --   --  0.31   ALK PHOS U/L  --   --  56   ALT U/L  --   --  9   AST U/L  --   --  21   GLUCOSE RANDOM mg/dL 120   < > 72    < > = values in this interval not displayed.      Results from last 7 days   Lab Units 11/26/23  1355   INR  0.96     Results from last 7 days   Lab Units 11/30/23  1104 11/30/23  0737 11/29/23  2204 11/29/23  1652 11/29/23  1145 11/29/23  0946 11/28/23  2049 11/28/23  1808 11/28/23  1115 11/28/23  0744 11/27/23  2129 11/27/23  1638   POC GLUCOSE mg/dl 121 140 212* 132 165* 176* 184* 189* 187* 126 185* 239*     Results from last 7 days   Lab Units 11/26/23  1355   HEMOGLOBIN A1C % 6.5*           Lines/Drains:  Invasive Devices       Peripheral Intravenous Line  Duration             Peripheral IV 11/18/23 Right Antecubital 11 days    Peripheral IV 11/29/23 Left Antecubital 1 day              Drain  Duration             Urethral Catheter Latex 16 Fr. 1 day                  Urinary Catheter:  Goal for removal: Voiding trial when ambulation improves               Imaging: No pertinent imaging reviewed. Recent Cultures (last 7 days):         Last 24 Hours Medication List:   Current Facility-Administered Medications   Medication Dose Route Frequency Provider Last Rate    acetaminophen  650 mg Oral Q6H PRN Christie Gan PA-C      albuterol  2 puff Inhalation Q6H PRN Tyron Fails, MD      atorvastatin  40 mg Oral Daily With [address], CRNP      ferrous sulfate  325 mg Oral Daily With Breakfast Tyron Fails, MD      heparin (porcine)  5,000 Units Subcutaneous [facility] Tyron Fails, MD      insulin lispro  1-5 Units Subcutaneous TID [facility] Tyron Fails, MD      iron sucrose  300 mg Intravenous Daily STEPHANIE Montague 0 mg (11/29/23 1359)    latanoprost  1 drop Both Eyes HS Tyron Fails, MD      levothyroxine  37.5 mcg Oral Daily Tyron Fails, MD      lidocaine  1 patch Topical Daily Livier Ruiz PA-C      metoprolol succinate  50 mg Oral Q12H [address] Tyron Fails, MD      Milnacipran HCl  1 tablet Oral Daily Tyron Fails, MD      oxybutynin  10 mg Oral Daily Tyron Fails, MD      oxyCODONE-acetaminophen  2 tablet Oral Q12H PRN Tyron Fails, MD      pantoprazole  40 mg Oral Daily Tyron Fails, MD      ranolazine  500 mg Oral Q12H Judah Stein MD          Today, Patient Was Seen By: Livier Ruiz PA-C    **Please Note: This note may have been constructed using a voice recognition system. **

## 2023-11-30 NOTE — PROGRESS NOTES
Progress Note - Nephrology    Every 78 y.o. female MRN: [id number]  Unit/Bed#: [facility] 623-01 Encounter: [id number]      Assessment / Plan:    Acute kidney injury-POA  Baseline creatinine 0.9  SADAF secondary to prerenal azotemia/volume depletion, dye exposure and urinary retention  Renal function has improved  Bateman catheter [id number] plan per primary service  Urinalysis with trace heme, otherwise bland /CT without hydronephrosis  Urinary retention  Status post Bateman catheter  Follow-up plan per primary service  Hyponatremia  Resolved after volume depletion corrected with intravenous fluids  Hypertension  Prior history of lisinopril usage  Currently off ACE in addition  Blood pressures currently acceptable  Would strive for systolic blood pressure of 1 30-1 40 to maintain renal perfusion  Anemia  Check SPEP/UPEP/FLC  Receiving Venofer  Other issues:  Diabetes mellitus-Per primary team.  Metformin on hold  Strokelike symptoms-status post neuro eval    Subjective: The patient was seen and examined early this morning. Overall, she felt well. She denied any shortness of breath, chest pain or pressure, abdominal pain, vomiting, diarrhea, sweats or chills. Objective:     Vitals: Blood pressure 135/72, pulse 69, temperature 97.8 °F (36.6 °C), resp. rate 18, height 5' (1.524 m), weight 77.6 kg (171 lb 1.2 oz), SpO2 96 %, not currently breastfeeding. ,Body mass index is 33.41 kg/m². Temp (24hrs), Av.9 °F (36.6 °C), Min:97.3 °F (36.3 °C), Max:98.8 °F (37.1 °C)      Weight (last 2 days)       None                   Intake/Output Summary (Last 24 hours) at 2023 1215  Last data filed at 2023 0501  Gross per 24 hour   Intake 1182.5 ml   Output 1275 ml   Net -92.5 ml     I/O last 24 hours: In: 1382.5 [P.O.:620;  I.V.:512.5; IV Piggyback:250]  Out:  [Urine:]        Physical Exam    /72   Pulse 69   Temp 97.8 °F (36.6 °C)   Resp 18   Ht 5' (1.524 m)   Wt 77.6 kg (171 lb 1.2 oz) LMP  (LMP Unknown)   SpO2 96%   BMI 33.41 kg/m²   Vital signs were reviewed  Constitutional: The patient was awake, alert, pleasant, cooperative and in no apparent distress  Cardiovascular: No JVD was noted, S1-S2, no pericardial friction rub S3 were appreciated, no peripheral edema noted  Pulmonary: Clear to auscultation and percussion                Invasive Devices       Peripheral Intravenous Line  Duration             Peripheral IV 11/18/23 Right Antecubital 11 days    Peripheral IV 11/29/23 Left Antecubital <1 day              Drain  Duration             Urethral Catheter Latex 16 Fr. 1 day                    Medications:    Scheduled Meds:  Current Facility-Administered Medications   Medication Dose Route Frequency Provider Last Rate    acetaminophen  650 mg Oral Q6H PRN Dhara Eason PA-C      albuterol  2 puff Inhalation Q6H PRN Jessica New MD      atorvastatin  40 mg Oral Daily With Elizabeth Media CRNP      ferrous sulfate  325 mg Oral Daily With Breakfast Jessica New MD      heparin (porcine)  5,000 Units Subcutaneous [facility] Jessica New MD      insulin lispro  1-5 Units Subcutaneous TID [facility] [place] Jessica New MD      iron sucrose  300 mg Intravenous Daily STEPHANIE Montague Stopped (11/29/23 1359)    latanoprost  1 drop Both Eyes HS Jessica New MD      levothyroxine  37.5 mcg Oral Daily Jessica New MD      lidocaine  1 patch Topical Daily Cassandra Guerrero PA-C      metoprolol succinate  50 mg Oral Q12H [address] Jessica New MD      Milnacipran HCl  1 tablet Oral Daily Jessica New MD      oxybutynin  10 mg Oral Daily Jessica New MD      oxyCODONE-acetaminophen  2 tablet Oral Q12H PRN Jessica New MD      pantoprazole  40 mg Oral Daily Jessica New MD      ranolazine  500 mg Oral Q12H [address] Jessica New MD         PRN Meds:.  acetaminophen    albuterol    oxyCODONE-acetaminophen    Continuous Infusions:         LAB RESULTS:      Results from last 7 days   Lab Units 11/30/23  0627 11/29/23  0537 11/28/23  0514 11/27/23  0413 11/26/23  1951 11/26/23  1355   WBC Thousand/uL  --  6.46 7.12 7.26  --  9.92   HEMOGLOBIN g/dL  --  8.3* 8.7* 8.3*  --  9.7*   HEMATOCRIT %  --  26.3* 26.8* 26.2*  --  30.2*   PLATELETS Thousands/uL  --  209 240 199 247 303   NEUTROS PCT %  --  60 65 64  --   --    LYMPHS PCT %  --  23 19 22  --   --    MONOS PCT %  --  11 12 10  --   --    EOS PCT %  --  5 3 3  --   --    POTASSIUM mmol/L 4.4 4.3 4.2 5.0  --  5.9*   CHLORIDE mmol/L 106 104 96 97  --  94*   CO2 mmol/L 26 25 27 24  --  24   BUN mg/dL 20 24 30* 31*  --  32*   CREATININE mg/dL 1.28 1.60* 1.66* 1.52*  --  1.58*   CALCIUM mg/dL 8.5 7.6* 8.5 7.6*  --  8.8   ALK PHOS U/L  --   --   --  56  --   --    ALT U/L  --   --   --  9  --   --    AST U/L  --   --   --  21  --   --        CUTURES:  Lab Results   Component Value Date    BLOODCX No Growth After 5 Days. 08/26/2023    BLOODCX No Growth After 5 Days. 08/26/2023    BLOODCX No Growth After 5 Days. 02/23/2023    BLOODCX No Growth After 5 Days. 02/23/2023    BLOODCX No Growth After 5 Days. 06/18/2022    BLOODCX No Growth After 5 Days. 06/18/2022    URINECX 50,000-59,000 cfu/ml 08/26/2023    URINECX No Growth <1000 cfu/mL 07/13/2023    URINECX 30,000-39,000 cfu/ml 01/31/2022    URINECX >100,000 cfu/ml 04/20/2021    URINECX No Growth <1000 cfu/mL 03/17/2020                 PLEASE NOTE:  This encounter was completed utilizing the [employer] Direct Speech Voice Recognition Software. Grammatical errors, random word insertions, pronoun errors and incomplete sentences are occasional consequences of the system due to software limitations, ambient noise and hardware issues. These may be missed by proof reading prior to affixing electronic signature. Any questions or concerns about the content, text or information contained within the body of this dictation should be directly addressed to the physician for clarification.  Please do not hesitate to call me directly if you have any any questions or concerns.

## 2023-11-30 NOTE — OCCUPATIONAL THERAPY NOTE
Occupational Therapy Treatment Note:      11/30/23 1500   OT Last Visit   OT Visit Date 11/30/23   Note Type   Note Type Treatment   Pain Assessment   Pain Assessment Tool 0-10   Pain Score 5  (reports chronic back pain)   ADL   Where Assessed Chair   Grooming Assistance 4  Minimal Assistance   Grooming Deficit   (asst to manage sleeves of gown while donning roll on deodorant)   UB Dressing Assistance 4  Minimal Assistance   UB Dressing Comments to mark mock over head shirt (using hospital gown)   LB Dressing Assistance 2  Maximal Assistance   LB Dressing Comments pt donned underwear seated in chair, max asst socks, max asst th thread underwear over legs and over catheter   [address]  1  Total Assistance   Functional Standing Tolerance   Time fair balance using rw static, fair minus with rw dynamic balance ie walking   Transfers   Sit to Stand 4  Minimal assistance   Additional items   (from tall arm chair towards beginning of session required mod assist, improved throughout session)   Functional Mobility   Functional Mobility 3  Moderate assistance   Additional Comments ax1 c rw   Additional items Rolling walker   Cognition   Overall Cognitive Status WFL   Activity Tolerance   Activity Tolerance Patient tolerated treatment well   Assessment   Assessment pt participated in pm ot session and was seen focusing on adl tasks, sit to from stand functional transfers and functional mobility. pt tolerated session well. she was oob for most of day in chair. she was able to walk in room short distance with mod asst x 1 c chair follow using rw.  pt required proximal balance support. pt was ab;e tp mark underdear with max asst inclusing threading ahn catheter through pantleg. pt required min asst for ub dressing while seated and max asst for socks. pt perfomred arom b ue's with no c/o coordination deficit. wll continue to assess functionally. no tremor noted l ue or le this session.  pt reports being at rehab for a week prior to admission. plan to continue to focus on goals from ie. Plan   Treatment Interventions ADL retraining;Functional transfer training; Endurance training;Patient/family training;Equipment evaluation/education; Activityengagement   Goal Expiration Date 12/13/23   OT Treatment Day 1   OT Frequency 2-3x/wk   Discharge Recommendation   Rehab Resource Intensity Level, OT II (Moderate Resource Intensity)   AM-PAC Daily Activity Inpatient   Lower Body Dressing 2   Bathing 2   Toileting 2   Upper Body Dressing 3   Grooming 3   Eating 3   Daily Activity Raw Score 15   Daily Activity Standardized Score (Calc for Raw Score >=11) 34.69   AM-PAC Applied Cognition Inpatient   Following a Speech/Presentation 4   Understanding Ordinary Conversation 4   Taking Medications 3   Remembering Where Things Are Placed or Put Away 3   Remembering List of 4-5 Errands 3   Taking Care of Complicated Tasks 3   Applied Cognition Raw Score 20   Applied Cognition Standardized Score 41.76     .me

## 2023-11-30 NOTE — CONSULTS
Consult - Urology   Flaca Elam 1945, 78 y.o. female MRN: [id number]    Unit/Bed#: [facility] 623-01 Encounter: [id number]    Urinary retention  Assessment & Plan    CT 11/26: No hydronephrosis, distended urinary bladder, moderate fecal stasis  Creat 1.28, trending down from 1.66  Maintain Bateman catheter  Discontinue oxybutynin inpatient and outpatient [facility]  Daily bowel regimen to prevent constipation  Avoid medications that can cause urinary retention such as narcotics, anticholinergics, antihistamines etc.  Void trial when more ambulatory, no longer constipated and oxybutynin discontinued, this can be done in outpatient setting  Follow-up with urology outpatient      Urology will sign off but remain available for any further inpatient needs. Please feel free to contact the provider currently covering the Urology [facility] role for this campus with questions or concerns. Subjective:   78-year-old female admitted for strokelike symptoms on 11/26. Symptoms for jaw numbness left arm numbness and left-sided facial droop with left lower extremity drift was not a candidate for thrombolytic treatment. She underwent Plavix load as well as addition of aspirin for treatment. She was initially having hematuria which has since resolved. She was noted to have urinary retention on CT study. She was straight cathed x 3 with Bateman catheter placement 11/29 due to failing retention protocol. Her baseline creatinine is 0.8-1.0 and her creatinine on this admission was 1.58 possibly related to IV contrast versus retention. He does have a prior history of lumbar surgery. She is also diabetic with an A1c of 6.5-8.6. She also takes oxybutynin for overactive bladder. CT scan from 11/26 revealed moderate fecal stasis, unremarkable urinary bladder, no hydronephrosis    Review of Systems   Constitutional:  Negative for activity change, appetite change, chills, fatigue, fever and unexpected weight change.    HENT: Negative for facial swelling. Eyes:  Negative for discharge. Respiratory: Negative. Negative for cough and shortness of breath. Cardiovascular:  Negative for chest pain and leg swelling. Gastrointestinal:  Positive for constipation. Negative for abdominal distention, abdominal pain, diarrhea, nausea and vomiting. Endocrine: Negative. Genitourinary:  Positive for difficulty urinating. Negative for decreased urine volume, dysuria, enuresis, flank pain, frequency, genital sores, hematuria and urgency. Musculoskeletal:  Negative for back pain and myalgias. Skin:  Negative for pallor and rash. Allergic/Immunologic: Negative. Negative for immunocompromised state. Neurological:  Negative for facial asymmetry and speech difficulty. Psychiatric/Behavioral:  Negative for agitation and confusion. Objective:  Vitals: Blood pressure 135/72, pulse 69, temperature 97.8 °F (36.6 °C), resp. rate 18, height 5' (1.524 m), weight 77.6 kg (171 lb 1.2 oz), SpO2 96 %, not currently breastfeeding. ,Body mass index is 33.41 kg/m². Physical Exam  Vitals and nursing note reviewed. Constitutional:       General: She is not in acute distress. Appearance: Normal appearance. She is not ill-appearing, toxic-appearing or diaphoretic. HENT:      Head: Normocephalic. Cardiovascular:      Rate and Rhythm: Normal rate. Pulmonary:      Effort: Pulmonary effort is normal. No respiratory distress. Abdominal:      General: Abdomen is flat. There is no distension. Genitourinary:     Comments: Bateman catheter draining clear yellow urine  Musculoskeletal:         General: No swelling. Cervical back: Normal range of motion. Skin:     General: Skin is warm and dry. Neurological:      General: No focal deficit present. Mental Status: She is alert and oriented to person, place, and time.    Psychiatric:         Mood and Affect: Mood normal.         Behavior: Behavior normal.         Thought Content: Thought content normal.         Imaging:  CT Abdomen: CTA chest abdomen and pelvis  Narrative & Impression   CTA - CHEST, ABDOMEN AND PELVIS - WITHOUT AND WITH IV CONTRAST     INDICATION:   back pain, stroke alert. COMPARISON: 8/26/2020. TECHNIQUE: CT examination of the chest, abdomen and pelvis was performed both prior to and after the administration of intravenous contrast.   The noncontrast portion of this examination was performed utilizing low radiation dose technique. Thin   section angiographic arterial phase post contrast technique was used in order to evaluate for aortic dissection. 3D reformatted images and volume rendering were performed on an independent workstation. Multiplanar 2D reformatted images were created   from the source data. Radiation dose length product (DLP) for this visit:  1510.04 mGy-cm . This examination, like all CT scans performed in the [facility], was performed utilizing techniques to minimize radiation dose exposure, including the use of   iterative reconstruction and automated exposure control. IV Contrast:  85 mL of iohexol (OMNIPAQUE)  Enteric Contrast:  Enteric contrast was not administered. FINDINGS:     Limited by beam hardening artifact from lumbar spine hardware. AORTA:  There is no aortic dissection or intramural hematoma. There is no aortic aneurysm. There is diffuse atherosclerotic disease, expected for age. There is bilateral renal artery stenosis secondary to atherosclerotic calcification. There is narrowing at the origin of the celiac axis though patent distally. The SMA is slightly narrowed at   its origin though patent     CHEST  LUNGS:  Lungs are clear. There is no tracheal or endobronchial lesion. PLEURA:  Unremarkable. HEART/PULMONARY ARTERIAL TREE:  Unremarkable for patient's age. MEDIASTINUM AND AYLA:  Unremarkable. CHEST WALL AND LOWER NECK:  Unremarkable.      ABDOMEN     LIVER/BILIARY TREE: Unremarkable. GALLBLADDER: Gallbladder is surgically absent. SPLEEN:  Unremarkable. PANCREAS:  Unremarkable. ADRENAL GLANDS:  Unremarkable. KIDNEYS/URETERS:  Unremarkable. No hydronephrosis. STOMACH AND BOWEL: There is moderate fecal stasis. .     APPENDIX:  No findings to suggest appendicitis. ABDOMINOPELVIC CAVITY:  No ascites or free intraperitoneal air. No lymphadenopathy. PELVIS     REPRODUCTIVE ORGANS: Patient is status post hysterectomy. URINARY BLADDER:  Unremarkable. ABDOMINAL WALL/INGUINAL REGIONS:  Unremarkable. OSSEOUS STRUCTURES:  No acute fracture or destructive osseous lesion. There has been extensive lumbar spine surgery with hardware throughout. There are multilevel degenerative changes throughout the thoracic spine. There is a spinal stimulator in place. IMPRESSION:     1. No acute aortic dissection. 2.  Moderate fecal stasis              Workstation performed: [id number]     Imaging reviewed - both report and images personally reviewed. Labs:  Recent Labs     23  05   WBC 7.12 6.46     Recent Labs     23  0514 23  0537   HGB 8.7* 8.3*       Recent Labs     2314 23  0537 23  0627   CREATININE 1.66* 1.60* 1.28       Microbiology:  Negative urine testing on     History:  Social History     Socioeconomic History    Marital status:       Spouse name: None    Number of children: None    Years of education: None    Highest education level: None   Occupational History    Occupation: Retired   Tobacco Use    Smoking status: Former     Types: Cigarettes     Quit date: 1970     Years since quittin.9    Smokeless tobacco: Never    Tobacco comments:     Denied history of current ever day smoker, Former smoker and Never smoker all documented in [employer]   Vaping Use    Vaping Use: None   Substance and Sexual Activity    Alcohol use: Not Currently     Comment: Denied history of alcohol use    Drug use: No     Comment: Denied history of drug use    Sexual activity: Not Currently   Other Topics Concern    None   Social History Narrative    Marital History - Currently  per [employer]     Social Determinants of Health     Financial Resource Strain: Medium Risk (8/9/2022)    Overall Financial Resource Strain (CARDIA)     Difficulty of Paying Living Expenses: Somewhat hard   Food Insecurity: No Food Insecurity (11/12/2023)    Hunger Vital Sign     Worried About Running Out of Food in the Last Year: Never true     Ran Out of Food in the Last Year: Never true   Transportation Needs: No Transportation Needs (11/12/2023)    PRAPARE - Transportation     Lack of Transportation (Medical): No     Lack of Transportation (Non-Medical):  No   Physical Activity: Not on file   Stress: Not on file   Social Connections: Not on file   Intimate Partner Violence: Not on file   Housing Stability: Low Risk  (11/12/2023)    Housing Stability Vital Sign     Unable to Pay for Housing in the Last Year: No     Number of Places Lived in the Last Year: 1     Unstable Housing in the Last Year: No       Past Medical History:   Diagnosis Date    Acid reflux     Anemia     hx of iron-deficient    Anxiety     Arthritis     Asthma     last needed inhaler last year 2020    Basal cell carcinoma     upper lip    Chronic narcotic dependence (HCC)     Chronic pain     Colon polyp     Cystocele     Diabetes mellitus ([address])     stable    Disease of thyroid gland     hypothyroidism    Diverticulosis     Dizziness     at times    Dysfunctional uterine bleeding     last assessed - [id number]    Dysphagia     Fibromyalgia     Gastric ulcer     Gastroparesis     History of colonic polyps     last assessed - [id number]    History of gastroesophageal reflux (GERD)     Hypercholesterolemia     Hyperlipidemia     Hypertension     IBS (irritable bowel syndrome)     Pneumobilia 06/18/2022    Post laminectomy syndrome     S/P insertion of spinal cord stimulator 07/18/2018    Seasonal allergies     Shortness of breath     exertional    Spinal stenosis     Status post lumbar spinal fusion 03/16/2018    Stroke [facility])     pt states slight stroke March 2022     Past Surgical History:   Procedure Laterality Date    APPENDECTOMY      BACK SURGERY      BREAST CYST EXCISION Left     CARDIAC CATHETERIZATION  11/14/2023    Procedure: Cardiac catheterization;  Surgeon: Anthony Augustin MD;  [facility]; Service: Cardiology    CARDIAC CATHETERIZATION N/A 11/14/2023    Procedure: Cardiac Coronary Angiogram;  Surgeon: Anthony Augustin MD;  [facility]; Service: Cardiology    CHOLECYSTECTOMY      COLONOSCOPY      ESOPHAGOGASTRODUODENOSCOPY N/A 09/28/2016    Procedure: ESOPHAGOGASTRODUODENOSCOPY (EGD); Surgeon: Johnathan Tnioco MD;  Location: [facility] GI [facility]; Service:     HERNIA REPAIR      HYSTERECTOMY      TTAH-BSO age 25    LAMINECTOMY      LUMBAR LAMINECTOMY      OOPHORECTOMY      age 25    IN ARTHRODESIS POSTERIOR/PSTLAT TQ 1NTRSPC THORACIC N/A 06/04/2018    Procedure: Reopening of lumbar incision for T12-L5 posterior instrumented fixation and fusion and T12-L4 posterior decompression;  Surgeon: Suyapa Barton MD;  Location: [facility];  Service: Neurosurgery    IN COLONOSCOPY FLX DX W/COLLJ SPEC WHEN PFRMD N/A 03/02/2016    Procedure: EGD AND COLONOSCOPY;  Surgeon: Johnathan Tinoco MD;  [facility]: [facility]; Service: Gastroenterology    IN DILATION [facility] UNGUIDED SOUND/BOUGIE 1/MULT PASS N/A 09/28/2016    Procedure: DILATATION ESOPHAGEAL;  Surgeon: Johnathan Tinoco MD;  [facility]: [facility]; Service: Gastroenterology    IN ESOPHAGOGASTRODUODENOSCOPY TRANSORAL DIAGNOSTIC N/A 07/18/2016    Procedure: ESOPHAGOGASTRODUODENOSCOPY (EGD); Surgeon: Johnathan Tinoco MD;  Location: [facility];   Service: Gastroenterology    IN INSJ/RPLCMT SPI NPGR DIR/INDUXIVE COUPLING Left 06/04/2018    Procedure: removal of left buttock implantable pulse generator and placement of new  implantable pulse generator;  Surgeon: Vertie Lanes, MD;  Location: [facility];  Service: Neurosurgery     Family History   Problem Relation Age of Onset    Lung cancer Mother 55    Pulmonary embolism Father     No Known Problems Sister     No Known Problems Daughter     No Known Problems Daughter     Stroke Maternal Grandmother     Heart attack Maternal Grandfather     No Known Problems Paternal Grandmother     No Known Problems Paternal Grandfather     No Known Problems Maternal Aunt     Diabetes Family         Diabetes mellitus    Hypertension Family     Stroke Family         Stroke complications       The following portions of the patient's history were reviewed and updated as appropriate: allergies, current medications, past family history, past medical history, past social history, past surgical history and problem list    NANY Dias  Date: 11/30/2023 Time: 4:43 PM

## 2023-11-30 NOTE — ASSESSMENT & PLAN NOTE
Lab Results   Component Value Date    HGBA1C 6.5 (H) 11/26/2023       Recent Labs     11/29/23  1652 11/29/23  2204 11/30/23  0737 11/30/23  1104   POCGLU 132 212* 140 121         Blood Sugar Average: Last 72 hrs:  (P) [id number]    Well-controlled as A1c is 6.5, patient takes metformin 1000 mg twice daily as an outpatient  Metformin currently held, on sliding scale insulin  Likely can resume metformin at discharge

## 2023-11-30 NOTE — PLAN OF CARE
Problem: Potential for Falls  Goal: Patient will remain free of falls  Description: INTERVENTIONS:  - Educate patient/family on patient safety including physical limitations  - Instruct patient to call for assistance with activity   - Consult OT/PT to assist with strengthening/mobility   - Keep Call bell within reach  - Keep bed low and locked with side rails adjusted as appropriate  - Keep care items and personal belongings within reach  - Initiate and maintain comfort rounds  - Make Fall Risk Sign visible to staff  - Offer Toileting every  Hours, in advance of need  - Initiate/Maintain alarm  - Obtain necessary fall risk management equipment:   - Apply yellow socks and bracelet for high fall risk patients  - Consider moving patient to room near nurses station  Outcome: Progressing     Problem: PAIN - ADULT  Goal: Verbalizes/displays adequate comfort level or baseline comfort level  Description: Interventions:  - Encourage patient to monitor pain and request assistance  - Assess pain using appropriate pain scale  - Administer analgesics based on type and severity of pain and evaluate response  - Implement non-pharmacological measures as appropriate and evaluate response  - Consider cultural and social influences on pain and pain management  - Notify physician/advanced practitioner if interventions unsuccessful or patient reports new pain  Outcome: Progressing     Problem: INFECTION - ADULT  Goal: Absence or prevention of progression during hospitalization  Description: INTERVENTIONS:  - Assess and monitor for signs and symptoms of infection  - Monitor lab/diagnostic results  - Monitor all insertion sites, i.e. indwelling lines, tubes, and drains  - Monitor endotracheal if appropriate and nasal secretions for changes in amount and color  - Gilman appropriate cooling/warming therapies per order  - Administer medications as ordered  - Instruct and encourage patient and family to use good hand hygiene technique  - Identify and instruct in appropriate isolation precautions for identified infection/condition  Outcome: Progressing  Goal: Absence of fever/infection during neutropenic period  Description: INTERVENTIONS:  - Monitor WBC    Outcome: Progressing     Problem: SAFETY ADULT  Goal: Patient will remain free of falls  Description: INTERVENTIONS:  - Educate patient/family on patient safety including physical limitations  - Instruct patient to call for assistance with activity   - Consult OT/PT to assist with strengthening/mobility   - Keep Call bell within reach  - Keep bed low and locked with side rails adjusted as appropriate  - Keep care items and personal belongings within reach  - Initiate and maintain comfort rounds  - Make Fall Risk Sign visible to staff  - Offer Toileting every  Hours, in advance of need  - Initiate/Maintain alarm  - Obtain necessary fall risk management equipment:   - Apply yellow socks and bracelet for high fall risk patients  - Consider moving patient to room near nurses station  Outcome: Progressing  Goal: Maintain or return to baseline ADL function  Description: INTERVENTIONS:  -  Assess patient's ability to carry out ADLs; assess patient's baseline for ADL function and identify physical deficits which impact ability to perform ADLs (bathing, care of mouth/teeth, toileting, grooming, dressing, etc.)  - Assess/evaluate cause of self-care deficits   - Assess range of motion  - Assess patient's mobility; develop plan if impaired  - Assess patient's need for assistive devices and provide as appropriate  - Encourage maximum independence but intervene and supervise when necessary  - Involve family in performance of ADLs  - Assess for home care needs following discharge   - Consider OT consult to assist with ADL evaluation and planning for discharge  - Provide patient education as appropriate  Outcome: Progressing  Goal: Maintains/Returns to pre admission functional level  Description: INTERVENTIONS:  - Perform AM-PAC 6 Click Basic Mobility/ Daily Activity assessment daily.  - Set and communicate daily mobility goal to care team and patient/family/caregiver. - Collaborate with rehabilitation services on mobility goals if consulted  - Perform Range of Motion  times a day. - Reposition patient every  hours. - Dangle patient  times a day  - Stand patient  times a day  - Ambulate patient  times a day  - Out of bed to chair  times a day   - Out of bed for meal times a day  - Out of bed for toileting  - Record patient progress and toleration of activity level   Outcome: Progressing     Problem: DISCHARGE PLANNING  Goal: Discharge to home or other facility with appropriate resources  Description: INTERVENTIONS:  - Identify barriers to discharge w/patient and caregiver  - Arrange for needed discharge resources and transportation as appropriate  - Identify discharge learning needs (meds, wound care, etc.)  - Arrange for interpretive services to assist at discharge as needed  - Refer to Case Management Department for coordinating discharge planning if the patient needs post-hospital services based on physician/advanced practitioner order or complex needs related to functional status, cognitive ability, or social support system  Outcome: Progressing     Problem: Knowledge Deficit  Goal: Patient/family/caregiver demonstrates understanding of disease process, treatment plan, medications, and discharge instructions  Description: Complete learning assessment and assess knowledge base. Interventions:  - Provide teaching at level of understanding  - Provide teaching via preferred learning methods  Outcome: Progressing     Problem: Nutrition/Hydration-ADULT  Goal: Nutrient/Hydration intake appropriate for improving, restoring or maintaining nutritional needs  Description: Monitor and assess patient's nutrition/hydration status for malnutrition.  Collaborate with interdisciplinary team and initiate plan and interventions as ordered. Monitor patient's weight and dietary intake as ordered or per policy. Utilize nutrition screening tool and intervene as necessary. Determine patient's food preferences and provide high-protein, high-caloric foods as appropriate.      INTERVENTIONS:  - Monitor oral intake, urinary output, labs, and treatment plans  - Assess nutrition and hydration status and recommend course of action  - Evaluate amount of meals eaten  - Assist patient with eating if necessary   - Allow adequate time for meals  - Recommend/ encourage appropriate diets, oral nutritional supplements, and vitamin/mineral supplements  - Order, calculate, and assess calorie counts as needed  - Recommend, monitor, and adjust tube feedings and TPN/PPN based on assessed needs  - Assess need for intravenous fluids  - Provide specific nutrition/hydration education as appropriate  - Include patient/family/caregiver in decisions related to nutrition  Outcome: Progressing     Problem: Prexisting or High Potential for Compromised Skin Integrity  Goal: Skin integrity is maintained or improved  Description: INTERVENTIONS:  - Identify patients at risk for skin breakdown  - Assess and monitor skin integrity  - Assess and monitor nutrition and hydration status  - Monitor labs   - Assess for incontinence   - Turn and reposition patient  - Assist with mobility/ambulation  - Relieve pressure over bony prominences  - Avoid friction and shearing  - Provide appropriate hygiene as needed including keeping skin clean and dry  - Evaluate need for skin moisturizer/barrier cream  - Collaborate with interdisciplinary team   - Patient/family teaching  - Consider wound care consult   Outcome: Progressing

## 2023-12-01 VITALS
TEMPERATURE: 98.5 F | WEIGHT: 171.08 LBS | RESPIRATION RATE: 18 BRPM | SYSTOLIC BLOOD PRESSURE: 109 MMHG | HEIGHT: 60 IN | DIASTOLIC BLOOD PRESSURE: 60 MMHG | HEART RATE: 71 BPM | OXYGEN SATURATION: 93 % | BODY MASS INDEX: 33.59 KG/M2

## 2023-12-01 LAB
ALBUMIN SERPL ELPH-MCNC: 2.85 G/DL (ref 3.2–5.1)
ALBUMIN SERPL ELPH-MCNC: 56.9 % (ref 48–70)
ALPHA1 GLOB SERPL ELPH-MCNC: 0.3 G/DL (ref 0.15–0.47)
ALPHA1 GLOB SERPL ELPH-MCNC: 6 % (ref 1.8–7)
ALPHA2 GLOB SERPL ELPH-MCNC: 0.78 G/DL (ref 0.42–1.04)
ALPHA2 GLOB SERPL ELPH-MCNC: 15.6 % (ref 5.9–14.9)
ANION GAP SERPL CALCULATED.3IONS-SCNC: 9 MMOL/L
BETA GLOB ABNORMAL SERPL ELPH-MCNC: 0.33 G/DL (ref 0.31–0.57)
BETA1 GLOB SERPL ELPH-MCNC: 6.6 % (ref 4.7–7.7)
BETA2 GLOB SERPL ELPH-MCNC: 5.2 % (ref 3.1–7.9)
BETA2+GAMMA GLOB SERPL ELPH-MCNC: 0.26 G/DL (ref 0.2–0.58)
BUN SERPL-MCNC: 16 MG/DL (ref 5–25)
CALCIUM SERPL-MCNC: 8.5 MG/DL (ref 8.4–10.2)
CHLORIDE SERPL-SCNC: 106 MMOL/L (ref 96–108)
CO2 SERPL-SCNC: 24 MMOL/L (ref 21–32)
CREAT SERPL-MCNC: 1.14 MG/DL (ref 0.6–1.3)
GAMMA GLOB ABNORMAL SERPL ELPH-MCNC: 0.49 G/DL (ref 0.4–1.66)
GAMMA GLOB SERPL ELPH-MCNC: 9.7 % (ref 6.9–22.3)
GFR SERPL CREATININE-BSD FRML MDRD: 45 ML/MIN/1.73SQ M
GLUCOSE SERPL-MCNC: 121 MG/DL (ref 65–140)
GLUCOSE SERPL-MCNC: 124 MG/DL (ref 65–140)
GLUCOSE SERPL-MCNC: 212 MG/DL (ref 65–140)
HCT VFR BLD AUTO: 26.5 % (ref 34.8–46.1)
HGB BLD-MCNC: 8.2 G/DL (ref 11.5–15.4)
IGG/ALB SER: 1.32 {RATIO} (ref 1.1–1.8)
INTERPRETATION UR IFE-IMP: NORMAL
KAPPA LC FREE SER-MCNC: 42.1 MG/L (ref 3.3–19.4)
KAPPA LC FREE/LAMBDA FREE SER: 2.33 {RATIO} (ref 0.26–1.65)
LAMBDA LC FREE SERPL-MCNC: 18.1 MG/L (ref 5.7–26.3)
POTASSIUM SERPL-SCNC: 4.1 MMOL/L (ref 3.5–5.3)
PROT PATTERN SERPL ELPH-IMP: ABNORMAL
PROT SERPL-MCNC: 5 G/DL (ref 6.4–8.2)
SODIUM SERPL-SCNC: 139 MMOL/L (ref 135–147)

## 2023-12-01 PROCEDURE — 85014 HEMATOCRIT: CPT | Performed by: INTERNAL MEDICINE

## 2023-12-01 PROCEDURE — 97116 GAIT TRAINING THERAPY: CPT

## 2023-12-01 PROCEDURE — 86334 IMMUNOFIX E-PHORESIS SERUM: CPT | Performed by: PATHOLOGY

## 2023-12-01 PROCEDURE — 99239 HOSP IP/OBS DSCHRG MGMT >30: CPT

## 2023-12-01 PROCEDURE — 84165 PROTEIN E-PHORESIS SERUM: CPT | Performed by: PATHOLOGY

## 2023-12-01 PROCEDURE — 97530 THERAPEUTIC ACTIVITIES: CPT

## 2023-12-01 PROCEDURE — 80048 BASIC METABOLIC PNL TOTAL CA: CPT | Performed by: INTERNAL MEDICINE

## 2023-12-01 PROCEDURE — 82948 REAGENT STRIP/BLOOD GLUCOSE: CPT

## 2023-12-01 PROCEDURE — 99232 SBSQ HOSP IP/OBS MODERATE 35: CPT | Performed by: INTERNAL MEDICINE

## 2023-12-01 PROCEDURE — 85018 HEMOGLOBIN: CPT | Performed by: INTERNAL MEDICINE

## 2023-12-01 RX ORDER — OXYCODONE AND ACETAMINOPHEN 10; 325 MG/1; MG/1
1 TABLET ORAL EVERY 12 HOURS PRN
Qty: 14 TABLET | Refills: 0 | Status: SHIPPED | OUTPATIENT
Start: 2023-12-01 | End: 2023-12-08

## 2023-12-01 RX ORDER — CLOPIDOGREL BISULFATE 75 MG/1
75 TABLET ORAL DAILY
Qty: 90 TABLET | Refills: 0 | Status: SHIPPED | OUTPATIENT
Start: 2023-12-01 | End: 2024-02-29

## 2023-12-01 RX ADMIN — IRON SUCROSE 300 MG: 20 INJECTION, SOLUTION INTRAVENOUS at 10:54

## 2023-12-01 RX ADMIN — FERROUS SULFATE TAB 325 MG (65 MG ELEMENTAL FE) 325 MG: 325 (65 FE) TAB at 08:26

## 2023-12-01 RX ADMIN — LEVOTHYROXINE SODIUM 37.5 MCG: 75 TABLET ORAL at 08:26

## 2023-12-01 RX ADMIN — METOPROLOL SUCCINATE 50 MG: 50 TABLET, EXTENDED RELEASE ORAL at 08:26

## 2023-12-01 RX ADMIN — RANOLAZINE 500 MG: 500 TABLET, FILM COATED, EXTENDED RELEASE ORAL at 08:27

## 2023-12-01 RX ADMIN — HEPARIN SODIUM 5000 UNITS: 5000 INJECTION INTRAVENOUS; SUBCUTANEOUS at 06:27

## 2023-12-01 RX ADMIN — LIDOCAINE 1 PATCH: 50 PATCH CUTANEOUS at 08:26

## 2023-12-01 RX ADMIN — INSULIN LISPRO 2 UNITS: 100 INJECTION, SOLUTION INTRAVENOUS; SUBCUTANEOUS at 12:43

## 2023-12-01 RX ADMIN — PANTOPRAZOLE SODIUM 40 MG: 40 TABLET, DELAYED RELEASE ORAL at 08:26

## 2023-12-01 NOTE — DISCHARGE INSTR - AVS FIRST PAGE
Please be aware of added Plavix to daily medication regimen. Plavix and aspirin should be taken for the next 90 days. After 90 days please continue with only taking the aspirin. Please no longer take your high Cosamin or baclofen as this may aid in your urinary retention issues  Please follow-up with the neurology team or neuro surgical team in 6 to 8 weeks. Lastly the urology was recommending that you trial a voiding trial in the short-term rehab. This voiding trial is the trial that should help you be able to remove the Bateman catheter. Please follow-up with your PCP within 1 week.

## 2023-12-01 NOTE — CASE MANAGEMENT
Case Management Discharge Planning Note    Patient name Ronnie Castro  Location [facility] 623/[facility] 906-14 MRN [id number]  : 1945 Date 2023       Current Admission Date: 2023  Current Admission Diagnosis:Stroke-like symptoms   Patient Active Problem List    Diagnosis Date Noted    Urinary retention 2023    SADAF (acute kidney injury) ([address]) 2023    Tremor 2023    Exertional shortness of breath 2023    Non obstructive CAD 11/15/2023    History of lumbar surgery 11/10/2023    Abnormal urinalysis 2023    Chronic obstructive pulmonary disease, unspecified COPD type ([address]) 2023    Confusion with body shakes and blank stare 2023    Normocytic anemia 2023    Bilateral lower extremity edema 2023    Cerebrovascular accident (CVA), unspecified mechanism ([address]) 2022    Acute kidney injury ([address]) 2022    Stroke-like symptoms 2022    Obesity, morbid ([address]) 2021    Prepyloric ulcer 2021    Diarrhea 2021    Mild intermittent asthma without complication     S/P insertion of spinal cord stimulator 2018    Iron deficiency anemia secondary to inadequate dietary iron intake 2018    Type 2 diabetes mellitus ([address])     Failed back surgical syndrome 2018    Hypothyroidism 2017    Degenerative lumbar spinal stenosis 2017    Glaucoma 2017    Overactive bladder 2016    Dyspepsia 2016    Hypercholesterolemia 2016    Anxiety 2015    Chronic pain disorder 2013    Hypertension 2013      LOS (days): 5  Geometric Mean LOS (GMLOS) (days): 3.10  Days to GMLOS:-1.6     OBJECTIVE:  Risk of Unplanned Readmission Score: 24.37         Current admission status: Inpatient   Preferred Pharmacy:   [address] [place] [address]  Phone: [phone] Fax: [phone]    [facility] [place] [address] [facility]  [address] [facility] [address]  Phone: [phone] Fax: [phone]    [address], [address] [place] [address] [address] [facility] [address]  [address] [address]  Phone: [phone] Fax: [phone]    [address]  [facility], Replaced by [facility] [place] [address] [address] [address]  Phone: [phone] Fax: [phone] [place] [address], [address]  [address]  [facility] [address] [address]  Phone: [phone] Fax: [phone]    Primary Care Provider: NANY Musa    Primary Insurance: [employer]ty  Secondary Insurance:     DISCHARGE DETAILS:      [address]         Is the patient interested in [address] at discharge?: No    DME Referral Provided  Referral made for DME?: No    Treatment Team Recommendation: Short Term Rehab  Discharge Destination Plan[de-identified] Short Term Rehab    IMM Given (Date):: 12/01/23  IMM Given to[de-identified] Family  Family notified[de-identified] Xochilt Daigle spoke to pt's dtr, Massiel Mallory, to inform her of the pt's d/c today @5384 to [facility]. Pt's dtr in agreement. Facility aware. CM contacted Marisol Figueroa @ [facility] [phone] and left him a voicemail.   CM will fax them pt's AVS.

## 2023-12-01 NOTE — ASSESSMENT & PLAN NOTE
Recently admitted to [facility] for this approx 2 weeks ago. Was seen by cardiology, felt not to be heart failure, more likely hypovolemic given SADAF.   VQ scan was negative, echo reviewed EF 91%, grade 1 diastolic dysfunction and no significant valvular disease  Monitor O2 sats with ambulation

## 2023-12-01 NOTE — DISCHARGE SUMMARY
[address] [facility]  Discharge- Diego Comber 1945, 78 y.o. female MRN: [id number]  Unit/Bed#: [facility] 623-01 Encounter: [id number]  Primary Care Provider: Rachna Lorenzo [address] [place] [address]   Date and time admitted to hospital: 11/26/2023  1:35 PM    * Stroke-like symptoms  Assessment & Plan  Presented as a prehospital stroke alert on 11/26 for left jaw numbness, left arm numbness and left facial droop. Patietn NIHSS 2 due to L facial droop and LLE drift. Was not a thrombolytic candidate secondary to last unknown well time. CT head without any acute abnormalities  CTA showed Stable moderate stenosis at the distal right MCA M1 segment compared to the prior 10/17/2023 examination. Unchanged focal moderate stenosis involving the inferior basilar artery with infundibular dilatation at the right AICA origin. 2. CTA neck: Moderate right extracranial ICA stenosis. The cervical vertebral arteries are patent. Seen by neurology, unable to have MRI due to spinal cord stimulator repeat head CT stable  Echo deferred as recently completed  Status post Plavix load x 1, recommendation by neurology to continue Plavix 75 mg daily along with aspirin 81 mg daily for 90 days then DC Plavix and continue on aspirin monotherapy  Continue statin  Needs outpatient Zio patch/loop recorder arranged via PCP  Neuro recommending outpatient evaluation of her tremor once metabolic derangements are corrected  PT/OT recommending rehab     Exertional shortness of breath  Assessment & Plan  Recently admitted to [facility] for this approx 2 weeks ago. Was seen by cardiology, felt not to be heart failure, more likely hypovolemic given SADAF. VQ scan was negative, echo reviewed EF 18%, grade 1 diastolic dysfunction and no significant valvular disease  Monitor O2 sats with ambulation    Tremor  Assessment & Plan  Noted upon examination.  Neurology evaluated, unclear if related to metabolic derangements, however patient reportedly does have a family history of tremor  Neurology is recommending outpatient workup for this after SADAF is resolved   Appears significantly improved overall  Lumbar CT reviewed with Nsx as above     SADAF (acute kidney injury) ([address])  Assessment & Plan  POA creatinine 1.58, baseline previously appears to be around 0.8-1.0 however during recent hospital stay did have SADAF with peak of 1.40. Suspected related to IV contrast on admission, decreased PO intake as well as urinary retention as below  During recent admission was taken off lisinopril indefinitely.  Daughter states was previously on HCTZ as well (not on med rec tho?)--continue to hold  Bateman now placed, see below  Avoid hypotension  S/p IVF  Renal consulted, input appreciated  Creatinine improved to 1.14    Chronic obstructive pulmonary disease, unspecified COPD type ([address] )  Assessment & Plan  Currently patient is not in any respiratory exacerbation  Albuterol as needed    S/P insertion of spinal cord stimulator  Assessment & Plan  Unclear details but not MRI compatible     Overactive bladder  Assessment & Plan  Continue home medication of oxybutynin 10 mg    Anxiety  Assessment & Plan  At baseline  Continue Lexapro  Supportive care     Iron deficiency anemia secondary to inadequate dietary iron intake  Assessment & Plan  Baseline appears between 8 and 9, unclear if this has been worked up in the outpatient setting   S/p IV venofer  SPEP, UPEP, Free light chains pending    Hypertension  Assessment & Plan  Blood pressure relatively well-controlled, continue Toprol-XL 50 mg twice daily, this was recently increased during prior admission as she was taken off lisinopril  Apparently was previously on HCTZ as well--should remain off     Chronic pain disorder  Assessment & Plan  Patient is on Percocet 10-3 25 every 12 hours as needed pain ( Pt usually takes it once per day if needed)   PDMP was reviewed by prior provider    Type 2 diabetes mellitus ([address] )  Assessment & Plan  Lab Results   Component Value Date    HGBA1C 6.5 (H) 11/26/2023       Recent Labs     11/30/23  1613 11/30/23  2044 12/01/23  0720 12/01/23  1142   POCGLU 149* 174* 124 212*         Blood Sugar Average: Last 72 hrs:  (P) [id number]    Well-controlled as A1c is 6.5, patient takes metformin 1000 mg twice daily as an outpatient  Metformin currently held, on sliding scale insulin  Likely can resume metformin at discharge      Medical Problems       Resolved Problems  Date Reviewed: 11/30/2023   None       Discharging Physician / Practitioner: Rosa Anne PA-C  PCP: Rosa Campbell [place]  Admission Date:   Admission Orders (From admission, onward)       Ordered        11/26/23 1523  INPATIENT ADMISSION  Once                          Discharge Date: 12/01/23    Consultations During Hospital Stay:  Urology, nephrology, wound care, neurology    Procedures Performed:   US kidney and bladder  Impression: No suspicious renal findings. Workstation performed: [id number]     CT spine lumbar wo contrast  Impression: Stable postsurgical changes status post fusion from T12-L5 with laminectomies from L1-L4. Degenerative spondylosis with multilevel neuroforaminal stenosis. Mild canal stenosis at L4-5 and L5-S1    CT head wo contrast  Impression: No acute intracranial abnormality. Stable chronic microangiopathic changes within the brain. A few secretions in the left sphenoid sinus which can represent acute sinusitis    CTA dissection protocol chest/abdomen/pelvis  Impression: 1. No acute aortic dissection. 2.  Moderate fecal stasis     CT stroke alert brain  Impression: No acute intracranial abnormality. Chronic microangiopathic ischemic changes. CTA stroke alert (head/neck)  Impression: 1. CTA head: Stable moderate stenosis at the distal right MCA M1 segment compared to the prior 10/17/2023 examination.  Unchanged focal moderate stenosis involving the inferior basilar artery with infundibular dilatation at the right AICA origin. 2. CTA neck: Moderate right extracranial ICA stenosis. The cervical vertebral arteries are patent      Significant Findings / Test Results: On admission sodium 128, potassium 5.9, creatinine 1.58, on time of DC sodium 139, potassium 4.1, creatinine 1.14  UA did not indicate UTI      Incidental Findings:   None      Test Results Pending at Discharge (will require follow up):   SPEP, UPEP, free light chains, follow-up with PCP     Outpatient Tests Requested:  Zio patch, follow-up PCP    Complications: None    Reason for Admission: Strokelike symptoms    Hospital Course: Flaca Elam is a 78 y.o. female patient who originally presented to the hospital on 11/26/2023 due to left jaw numbness, left arm numbness, left facial droop that lasted for an unknown period of time prior to admission. A CT head was done and showed no acute abnormalities. The patient was admitted for strokelike work-up. An MRI was not able to be conducted given the patient's spinal cord stimulator. Neurology was ultimately consulted. At that time the neurology team recommended a repeat CT of head in 24 hours, obtaining echocardiogram, and start loading doses of ASA and Plavix. The neurology then recommended the patient should have a follow-up in 6 to 8 weeks and should be continued on ASA and Plavix for 90 days before starting monotherapy of ASA. The patient was additionally found to have an SADAF and nephrology was consulted on 11/29. The nephrology team recommended a repeat ultrasound of the kidneys and bladder which showed no acute abnormalities. .  Previous CT imaging showed no hydronephrosis. They also recommended a myeloma work-up which is currently pending and recommended she follow-up with PCP. A Bateman catheter was placed which prompted the urology team to be consulted. After the Bateman catheter was placed and given gentle IVF, the patient's SADAF significantly improved.   The urology was recommending an outpatient voiding trial.  At this time the patient is medically stable for discharge and agreeable for plan at discharge. She will be discharged to a short-term rehab, [facility]. For further information regards course hospitalization, please see notes attached. Please see above list of diagnoses and related plan for additional information. Condition at Discharge: good    Discharge Day Visit / Exam:   Subjective: Patient reports feeling well like to be discharged today. She denies chest pain/pressure, palpitations lightheadedness, nausea, shortness breath, or chills. Vitals: Blood Pressure: 109/60 (12/01/23 0640)  Pulse: 71 (12/01/23 0640)  Temperature: 98.5 °F (36.9 °C) (12/01/23 0640)  Temp Source: Tympanic (11/26/23 1337)  Respirations: 18 (12/01/23 0640)  Height: 5' (152.4 cm) (11/26/23 1418)  Weight - Scale: 77.6 kg (171 lb 1.2 oz) (11/26/23 1418)  SpO2: 93 % (12/01/23 0640)  Exam:   Physical Exam  Vitals and nursing note reviewed. Constitutional:       Appearance: She is normal weight. HENT:      Head: Normocephalic. Nose: Nose normal.      Mouth/Throat:      Mouth: Mucous membranes are moist.      Pharynx: Oropharynx is clear. Eyes:      General: No scleral icterus. Conjunctiva/sclera: Conjunctivae normal.      Pupils: Pupils are equal, round, and reactive to light. Cardiovascular:      Rate and Rhythm: Normal rate and regular rhythm. Heart sounds: No murmur heard. No friction rub. No gallop. Pulmonary:      Effort: Pulmonary effort is normal. No respiratory distress. Breath sounds: Normal breath sounds. No stridor. No wheezing, rhonchi or rales. Abdominal:      General: Abdomen is flat. Palpations: Abdomen is soft. Musculoskeletal:         General: Normal range of motion. Cervical back: Normal range of motion and neck supple. Right lower leg: No edema. Left lower leg: No edema. Lymphadenopathy:      Cervical: No cervical adenopathy.    Skin: General: Skin is warm. Coloration: Skin is not jaundiced or pale. Findings: No bruising, erythema or lesion. Neurological:      General: No focal deficit present. Mental Status: She is alert and oriented to person, place, and time. Mental status is at baseline. Cranial Nerves: No cranial nerve deficit. Motor: No weakness. Psychiatric:         Mood and Affect: Mood normal.         Behavior: Behavior normal.         Thought Content: Thought content normal.          Discussion with Family: Attempted to update  (daughter) via phone. Left voicemail. Discharge instructions/Information to patient and family:   See after visit summary for information provided to patient and family. Provisions for Follow-Up Care:  See after visit summary for information related to follow-up care and any pertinent home health orders. Mobility at time of Discharge:   Basic Mobility Inpatient Raw Score: 10  JH-HLM Goal: 4: Move to chair/commode  JH-HLM Achieved: 6: Walk 10 steps or more  HLM Goal achieved. Continue to encourage appropriate mobility. Disposition:   Assisted Living Facility at [facility]    Planned Readmission: No     Discharge Statement:  I spent 60 minutes discharging the patient. This time was spent on the day of discharge. I had direct contact with the patient on the day of discharge. Greater than 50% of the total time was spent examining patient, answering all patient questions, arranging and discussing plan of care with patient as well as directly providing post-discharge instructions. Additional time then spent on discharge activities. Discharge Medications:  See after visit summary for reconciled discharge medications provided to patient and/or family.       **Please Note: This note may have been constructed using a voice recognition system** Bill For Surgical Tray: no Depth Of Biopsy: dermis Electrodesiccation Text: The wound bed was treated with electrodesiccation after the biopsy was performed. Biopsy Type: H and E X Size Of Lesion In Cm: 0 Consent: Written consent was obtained and risks were reviewed including but not limited to scarring, infection, bleeding, scabbing, incomplete removal, nerve damage and allergy to anesthesia. Type Of Destruction Used: Curettage Anesthesia Volume In Cc (Will Not Render If 0): 0.5 Electrodesiccation And Curettage Text: The wound bed was treated with electrodesiccation and curettage after the biopsy was performed. Dressing: Band-Aid Hemostasis: Monsel's and Electrocautery Silver Nitrate Text: The wound bed was treated with silver nitrate after the biopsy was performed. Was A Bandage Applied: Yes Billing Type: Third-Party Bill Biopsy Method: double edge Personna blade Curettage Text: The wound bed was treated with curettage after the biopsy was performed. Detail Level: Detailed Lab: -[phone] Post-Care Instructions: I reviewed with the patient in detail post-care instructions. Patient is to keep the biopsy site dry overnight, and then apply bacitracin twice daily until healed. Patient may apply hydrogen peroxide soaks to remove any crusting. Cryotherapy Text: The wound bed was treated with cryotherapy after the biopsy was performed. Anesthesia Type: 1% lidocaine with 1:100,000 epinephrine and a 1:10 solution of 8.4% sodium bicarbonate Wound Care: Petrolatum Notification Instructions: Patient will be notified of biopsy results. However, patient instructed to call the office if not contacted within 2 weeks.

## 2023-12-01 NOTE — ASSESSMENT & PLAN NOTE
POA creatinine 1.58, baseline previously appears to be around 0.8-1.0 however during recent hospital stay did have SADAF with peak of 1.40. Suspected related to IV contrast on admission, decreased PO intake as well as urinary retention as below  During recent admission was taken off lisinopril indefinitely.  Daughter states was previously on HCTZ as well (not on med rec tho?)--continue to hold  Bateman now placed, see below  Avoid hypotension  S/p IVF  Renal consulted, input appreciated  Creatinine improved to 1.14

## 2023-12-01 NOTE — PLAN OF CARE
Problem: Potential for Falls  Goal: Patient will remain free of falls  Description: INTERVENTIONS:  - Educate patient/family on patient safety including physical limitations  - Instruct patient to call for assistance with activity   - Consult OT/PT to assist with strengthening/mobility   - Keep Call bell within reach  - Keep bed low and locked with side rails adjusted as appropriate  - Keep care items and personal belongings within reach  - Initiate and maintain comfort rounds  - Make Fall Risk Sign visible to staff  - Offer Toileting every  Hours, in advance of need  - Initiate/Maintain alarm  - Obtain necessary fall risk management equipment:   - Apply yellow socks and bracelet for high fall risk patients  - Consider moving patient to room near nurses station  Outcome: Progressing     Problem: PAIN - ADULT  Goal: Verbalizes/displays adequate comfort level or baseline comfort level  Description: Interventions:  - Encourage patient to monitor pain and request assistance  - Assess pain using appropriate pain scale  - Administer analgesics based on type and severity of pain and evaluate response  - Implement non-pharmacological measures as appropriate and evaluate response  - Consider cultural and social influences on pain and pain management  - Notify physician/advanced practitioner if interventions unsuccessful or patient reports new pain  Outcome: Progressing     Problem: INFECTION - ADULT  Goal: Absence or prevention of progression during hospitalization  Description: INTERVENTIONS:  - Assess and monitor for signs and symptoms of infection  - Monitor lab/diagnostic results  - Monitor all insertion sites, i.e. indwelling lines, tubes, and drains  - Monitor endotracheal if appropriate and nasal secretions for changes in amount and color  - [place] appropriate cooling/warming therapies per order  - Administer medications as ordered  - Instruct and encourage patient and family to use good hand hygiene technique  - Identify and instruct in appropriate isolation precautions for identified infection/condition  Outcome: Progressing  Goal: Absence of fever/infection during neutropenic period  Description: INTERVENTIONS:  - Monitor WBC    Outcome: Progressing     Problem: SAFETY ADULT  Goal: Patient will remain free of falls  Description: INTERVENTIONS:  - Educate patient/family on patient safety including physical limitations  - Instruct patient to call for assistance with activity   - Consult OT/PT to assist with strengthening/mobility   - Keep Call bell within reach  - Keep bed low and locked with side rails adjusted as appropriate  - Keep care items and personal belongings within reach  - Initiate and maintain comfort rounds  - Make Fall Risk Sign visible to staff  - Offer Toileting every  Hours, in advance of need  - Initiate/Maintain alarm  - Obtain necessary fall risk management equipment:   - Apply yellow socks and bracelet for high fall risk patients  - Consider moving patient to room near nurses station  Outcome: Progressing  Goal: Maintain or return to baseline ADL function  Description: INTERVENTIONS:  -  Assess patient's ability to carry out ADLs; assess patient's baseline for ADL function and identify physical deficits which impact ability to perform ADLs (bathing, care of mouth/teeth, toileting, grooming, dressing, etc.)  - Assess/evaluate cause of self-care deficits   - Assess range of motion  - Assess patient's mobility; develop plan if impaired  - Assess patient's need for assistive devices and provide as appropriate  - Encourage maximum independence but intervene and supervise when necessary  - Involve family in performance of ADLs  - Assess for home care needs following discharge   - Consider OT consult to assist with ADL evaluation and planning for discharge  - Provide patient education as appropriate  Outcome: Progressing  Goal: Maintains/Returns to pre admission functional level  Description: INTERVENTIONS:  - Perform AM-PAC 6 Click Basic Mobility/ Daily Activity assessment daily.  - Set and communicate daily mobility goal to care team and patient/family/caregiver. - Collaborate with rehabilitation services on mobility goals if consulted  - Perform Range of Motion  times a day. - Reposition patient every  hours. - Dangle patient  times a day  - Stand patient  times a day  - Ambulate patient  times a day  - Out of bed to chair  times a day   - Out of bed for meal times a day  - Out of bed for toileting  - Record patient progress and toleration of activity level   Outcome: Progressing     Problem: DISCHARGE PLANNING  Goal: Discharge to home or other facility with appropriate resources  Description: INTERVENTIONS:  - Identify barriers to discharge w/patient and caregiver  - Arrange for needed discharge resources and transportation as appropriate  - Identify discharge learning needs (meds, wound care, etc.)  - Arrange for interpretive services to assist at discharge as needed  - Refer to Case Management Department for coordinating discharge planning if the patient needs post-hospital services based on physician/advanced practitioner order or complex needs related to functional status, cognitive ability, or social support system  Outcome: Progressing     Problem: Knowledge Deficit  Goal: Patient/family/caregiver demonstrates understanding of disease process, treatment plan, medications, and discharge instructions  Description: Complete learning assessment and assess knowledge base. Interventions:  - Provide teaching at level of understanding  - Provide teaching via preferred learning methods  Outcome: Progressing     Problem: Nutrition/Hydration-ADULT  Goal: Nutrient/Hydration intake appropriate for improving, restoring or maintaining nutritional needs  Description: Monitor and assess patient's nutrition/hydration status for malnutrition.  Collaborate with interdisciplinary team and initiate plan and interventions as ordered. Monitor patient's weight and dietary intake as ordered or per policy. Utilize nutrition screening tool and intervene as necessary. Determine patient's food preferences and provide high-protein, high-caloric foods as appropriate.      INTERVENTIONS:  - Monitor oral intake, urinary output, labs, and treatment plans  - Assess nutrition and hydration status and recommend course of action  - Evaluate amount of meals eaten  - Assist patient with eating if necessary   - Allow adequate time for meals  - Recommend/ encourage appropriate diets, oral nutritional supplements, and vitamin/mineral supplements  - Order, calculate, and assess calorie counts as needed  - Recommend, monitor, and adjust tube feedings and TPN/PPN based on assessed needs  - Assess need for intravenous fluids  - Provide specific nutrition/hydration education as appropriate  - Include patient/family/caregiver in decisions related to nutrition  Outcome: Progressing     Problem: Prexisting or High Potential for Compromised Skin Integrity  Goal: Skin integrity is maintained or improved  Description: INTERVENTIONS:  - Identify patients at risk for skin breakdown  - Assess and monitor skin integrity  - Assess and monitor nutrition and hydration status  - Monitor labs   - Assess for incontinence   - Turn and reposition patient  - Assist with mobility/ambulation  - Relieve pressure over bony prominences  - Avoid friction and shearing  - Provide appropriate hygiene as needed including keeping skin clean and dry  - Evaluate need for skin moisturizer/barrier cream  - Collaborate with interdisciplinary team   - Patient/family teaching  - Consider wound care consult   Outcome: Progressing

## 2023-12-01 NOTE — TELEPHONE ENCOUNTER
Patient will be discharged today to [facility].  Will reach out and request void trial in facility next week

## 2023-12-01 NOTE — ASSESSMENT & PLAN NOTE
Lab Results   Component Value Date    HGBA1C 6.5 (H) 11/26/2023       Recent Labs     11/30/23  1613 11/30/23  2044 12/01/23  0720 12/01/23  1142   POCGLU 149* 174* 124 212*         Blood Sugar Average: Last 72 hrs:  (P) [id number]    Well-controlled as A1c is 6.5, patient takes metformin 1000 mg twice daily as an outpatient  Metformin currently held, on sliding scale insulin  Likely can resume metformin at discharge

## 2023-12-01 NOTE — PHYSICAL THERAPY NOTE
Physical Therapy Progress Note     12/01/23 1050   PT Last Visit   PT Visit Date 12/01/23   Note Type   Note Type Treatment   Pain Assessment   Pain Assessment Tool FLACC   Pain Rating: FLACC (Rest) - Face 1   Pain Rating: FLACC (Rest) - Legs 0   Pain Rating: FLACC (Rest) - Activity 0   Pain Rating: FLACC (Rest) - Cry 1   Pain Rating: FLACC (Rest) - Consolability 0   Score: FLACC (Rest) 2   Pain Rating: FLACC (Activity) - Face 1   Pain Rating: FLACC (Activity) - Legs 1   Pain Rating: FLACC (Activity) - Activity 1   Pain Rating: FLACC (Activity) - Cry 1   Pain Rating: FLACC (Activity) - Consolability 0   Score: FLACC (Activity) 4   Restrictions/Precautions   Other Precautions Cognitive; Chair Alarm;Pain; Fall Risk   Subjective   Subjective Pt encountered supine in bed, pleasant and agreeable to treatment. Reports feeling better overall & offers no new complaints at rest.  Reports tremors are better overall & none demonstrated during today's session. Pt reports LLE & back soreness post activity, but did not request even her own baseline prn meds when prompted. Bed Mobility   Supine to Sit 4  Minimal assistance   Additional items Assist x 1;HOB elevated; Bedrails; Increased time required;Verbal cues;LE management   Transfers   Sit to Stand 4  Minimal assistance   Additional items Assist x 1; Armrests; Increased time required   Stand to Sit 4  Minimal assistance   Additional items Assist x 1; Armrests; Increased time required;Verbal cues   Ambulation/Elevation   Gait pattern Short stride; Foward flexed; Inconsistent jorge; Shuffling;Decreased L stance;Decreased foot clearance;Narrow RUBIN; Improper Weight shift   Gait Assistance 3  Moderate assist   Additional items Assist x 1  (+ chair follow)   Assistive Device Rolling walker   Distance 70'   Balance   Static Sitting Fair   Static Standing Fair -   Ambulatory Poor +   Activity Tolerance   Activity Tolerance Patient tolerated treatment well;Patient limited by fatigue;Patient limited by pain   Nurse [address] Carley RN   Assessment   Prognosis Fair   Problem List Decreased strength;Decreased range of motion;Decreased endurance; Impaired balance;Decreased mobility; Decreased coordination;Pain   Assessment pt demonstrated improved functional strength, endurance, and overall mobitliy this session. Performed all transfers with decreased assist, then ambulated up to household distances without LOB despite gait deviations as noted above. She did require assist for RW management, as well as instructions to maintain proximity to AD, which may be habituatl given baseline rollator use. Pt reached her self appointed goals when prompted, which gave her satisfaction with progress today as well. Further gait trials deferred so as not to exhaust pt's energy reserves since this was her first extended gait trial in recent days without onset of worsening neurological deficits that limited her in previous trials. PT will continue to follow and treat as appropriate to address functional deficits & ensure safe return to PLOF. Goals   Patient Goals to get better & go home   STG Expiration Date 12/09/23   PT Treatment Day 1   Plan   Treatment/Interventions Functional transfer training;LE strengthening/ROM; Elevations; Therapeutic exercise; Endurance training;Equipment eval/education;Patient/family training;Bed mobility;Gait training   Progress Progressing toward goals   PT Frequency 3-5x/wk   Discharge Recommendation   Rehab Resource Intensity Level, PT II (Moderate Resource Intensity)   Equipment Recommended Abdi Lewis Package Recommended Wheeled walker   AM-PAC Basic Mobility Inpatient   Turning in Flat Bed Without Bedrails 2   Lying on Back to Sitting on Edge of Flat Bed Without Bedrails 2   Moving Bed to Chair 2   Standing Up From Chair Using Arms 2   Walk in Room 2   Climb 3-5 Stairs With Railing 1   Basic Mobility Inpatient Raw Score 11   Basic Mobility Standardized Score 30.25   Highest Level Of Mobility   JH-HLM Goal 4: Move to chair/commode   JH-HLM Achieved 7: Walk 25 feet or more       Zander Vines PTA    An [facility] Basic Mobility Raw Score less than 17 suggests pt would benefit from post acute rehab. Please also refer to the recommendation of the Physical Therapist for safe discharge planning.

## 2023-12-01 NOTE — PROGRESS NOTES
Progress Note - Nephrology   Cindy Lock 78 y.o. 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 Needs outpatient urology follow-up discussed with the [facility] team.  Urinalysis with trace heme, otherwise bland /CT without hydronephrosis  Urinary retention  Status post Bateman catheter  Trial of voiding as an outpatient, urology follow-up  Hyponatremia  Resolved after volume depletion corrected with intravenous fluids. Sodium level remains normal  Hypertension  Prior history of lisinopril usage  Currently off ACE in addition  Blood pressures are currently acceptable  Anemia  Check SPEP/UPEP-no monoclonal bands/FLC/immunofixation  Received Venofer  Other issues:  Diabetes mellitus-Per primary team.  Metformin on hold  Strokelike symptoms-status post neuro esteban    Disposition: Okay to discharge from a renal standpoint. The patient can follow-up with her PCP. Subjective: The patient was seen and examined earlier this morning. She denied any shortness of breath, chest pain or pressure, abdominal pain, vomiting, sweats, chills, paroxysmal atrial dyspnea orthopnea. Objective:     Vitals: Blood pressure 109/60, pulse 71, temperature 98.5 °F (36.9 °C), resp. rate 18, height 5' (1.524 m), weight 77.6 kg (171 lb 1.2 oz), SpO2 93 %, not currently breastfeeding. ,Body mass index is 33.41 kg/m². Temp (24hrs), Av.5 °F (36.9 °C), Min:98.5 °F (36.9 °C), Max:98.5 °F (36.9 °C)      Weight (last 2 days)       None                   Intake/Output Summary (Last 24 hours) at 2023 1234  Last data filed at 2023 0900  Gross per 24 hour   Intake 240 ml   Output 1700 ml   Net -1460 ml     I/O last 24 hours:   In: 240 [P.O.:240]  Out: 1700 [Urine:1700]        Physical Exam    /60   Pulse 71   Temp 98.5 °F (36.9 °C)   Resp 18   Ht 5' (1.524 m)   Wt 77.6 kg (171 lb 1.2 oz)   LMP  (LMP Unknown)   SpO2 93%   BMI 33.41 kg/m²   Vital signs were reviewed  Constitutional: The patient was awake, alert, pleasant, cooperative and in no apparent distress  Cardiovascular: No overt jugular vein distention was noted, S1-S2, no pericardial friction rub or S3 were appreciated, no edema was noted  Pulmonary: Adequate inspiratory effort, decreased breath sounds, lungs were clear                Invasive Devices       Peripheral Intravenous Line  Duration             Peripheral IV 11/29/23 Left Antecubital 2 days              Drain  Duration             Urethral Catheter Latex 16 Fr. 2 days                    Medications:    Scheduled Meds:  Current Facility-Administered Medications   Medication Dose Route Frequency Provider Last Rate    acetaminophen  650 mg Oral Q6H PRN Christie Friend, STEPHANIE      albuterol  2 puff Inhalation Q6H PRN Tyron Fails, MD      atorvastatin  40 mg Oral Daily With [address], CRNP      ferrous sulfate  325 mg Oral Daily With Breakfast Tyron Fails, MD      heparin (porcine)  5,000 Units Subcutaneous [facility] Tyron Fails, MD      insulin lispro  1-5 Units Subcutaneous TID AC Tyron Fails, MD      latanoprost  1 drop Both Eyes HS Tyron Fails, MD      levothyroxine  37.5 mcg Oral Daily Tyron Fails, MD      lidocaine  1 patch Topical Daily Livier Hence, STEPHANIE      methocarbamol  500 mg Oral Q6H PRN Christie FriendSTEPHANIE      metoprolol succinate  50 mg Oral Q12H [facility] Tyron Fails, MD      Milnacipran HCl  1 tablet Oral Daily Tyron Fails, MD      oxyCODONE-acetaminophen  2 tablet Oral Q12H PRN Tyron Fails, MD      pantoprazole  40 mg Oral Daily Tyron Fails, MD      ranolazine  500 mg Oral Q12H [facility] Tyron Fails, MD         PRN Meds:.  acetaminophen    albuterol    methocarbamol oxyCODONE-acetaminophen    Continuous Infusions:         LAB RESULTS:      Results from last 7 days   Lab Units 12/01/23  0630 11/30/23  0627 11/29/23  0537 11/28/23  0514 11/27/23  0413 11/26/23 1951 11/26/23  1355   WBC Thousand/uL  --   --  6.46 7.12 7.26  --  9.92   HEMOGLOBIN g/dL 8.2*  --  8.3* 8.7* 8.3*  --  9.7*   HEMATOCRIT % 26.5*  --  26.3* 26.8* 26.2*  --  30.2*   PLATELETS Thousands/uL  --   --  209 240 199 247 303   NEUTROS PCT %  --   --  60 65 64  --   --    LYMPHS PCT %  --   --  23 19 22  --   --    MONOS PCT %  --   --  11 12 10  --   --    EOS PCT %  --   --  5 3 3  --   --    POTASSIUM mmol/L 4.1 4.4 4.3 4.2 5.0  --  5.9*   CHLORIDE mmol/L 106 106 104 96 97  --  94*   CO2 mmol/L 24 26 25 27 24  --  24   BUN mg/dL 16 20 24 30* 31*  --  32*   CREATININE mg/dL 1.14 1.28 1.60* 1.66* 1.52*  --  1.58*   CALCIUM mg/dL 8.5 8.5 7.6* 8.5 7.6*  --  8.8   ALK PHOS U/L  --   --   --   --  56  --   --    ALT U/L  --   --   --   --  9  --   --    AST U/L  --   --   --   --  21  --   --        CUTURES:  Lab Results   Component Value Date    BLOODCX No Growth After 5 Days. 08/26/2023    BLOODCX No Growth After 5 Days. 08/26/2023    BLOODCX No Growth After 5 Days. 02/23/2023    BLOODCX No Growth After 5 Days. 02/23/2023    BLOODCX No Growth After 5 Days. 06/18/2022    BLOODCX No Growth After 5 Days. 06/18/2022    URINECX 50,000-59,000 cfu/ml 08/26/2023    URINECX No Growth <1000 cfu/mL 07/13/2023    URINECX 30,000-39,000 cfu/ml 01/31/2022    URINECX >100,000 cfu/ml 04/20/2021    URINECX No Growth <1000 cfu/mL 03/17/2020                 PLEASE NOTE:  This encounter was completed utilizing the [employer] Direct Speech Voice Recognition Software. Grammatical errors, random word insertions, pronoun errors and incomplete sentences are occasional consequences of the system due to software limitations, ambient noise and hardware issues. These may be missed by proof reading prior to affixing electronic signature. Any questions or concerns about the content, text or information contained within the body of this dictation should be directly addressed to the physician for clarification. Please do not hesitate to call me directly if you have any any questions or concerns.

## 2023-12-01 NOTE — ASSESSMENT & PLAN NOTE
Noted upon examination.  Neurology evaluated, unclear if related to metabolic derangements, however patient reportedly does have a family history of tremor  Neurology is recommending outpatient workup for this after SADAF is resolved   Appears significantly improved overall  Lumbar CT reviewed with Nsx as above

## 2023-12-01 NOTE — PLAN OF CARE
Problem: Potential for Falls  Goal: Patient will remain free of falls  Description: INTERVENTIONS:  - Educate patient/family on patient safety including physical limitations  - Instruct patient to call for assistance with activity   - Consult OT/PT to assist with strengthening/mobility   - Keep Call bell within reach  - Keep bed low and locked with side rails adjusted as appropriate  - Keep care items and personal belongings within reach  - Initiate and maintain comfort rounds  - Make Fall Risk Sign visible to staff  - Offer Toileting every  Hours, in advance of need  - Initiate/Maintain alarm  - Obtain necessary fall risk management equipment:   - Apply yellow socks and bracelet for high fall risk patients  - Consider moving patient to room near nurses station  Outcome: Adequate for Discharge     Problem: PAIN - ADULT  Goal: Verbalizes/displays adequate comfort level or baseline comfort level  Description: Interventions:  - Encourage patient to monitor pain and request assistance  - Assess pain using appropriate pain scale  - Administer analgesics based on type and severity of pain and evaluate response  - Implement non-pharmacological measures as appropriate and evaluate response  - Consider cultural and social influences on pain and pain management  - Notify physician/advanced practitioner if interventions unsuccessful or patient reports new pain  Outcome: Adequate for Discharge     Problem: INFECTION - ADULT  Goal: Absence or prevention of progression during hospitalization  Description: INTERVENTIONS:  - Assess and monitor for signs and symptoms of infection  - Monitor lab/diagnostic results  - Monitor all insertion sites, i.e. indwelling lines, tubes, and drains  - Monitor endotracheal if appropriate and nasal secretions for changes in amount and color  - Campbell appropriate cooling/warming therapies per order  - Administer medications as ordered  - Instruct and encourage patient and family to use good hand hygiene technique  - Identify and instruct in appropriate isolation precautions for identified infection/condition  Outcome: Adequate for Discharge  Goal: Absence of fever/infection during neutropenic period  Description: INTERVENTIONS:  - Monitor WBC    Outcome: Adequate for Discharge     Problem: SAFETY ADULT  Goal: Patient will remain free of falls  Description: INTERVENTIONS:  - Educate patient/family on patient safety including physical limitations  - Instruct patient to call for assistance with activity   - Consult OT/PT to assist with strengthening/mobility   - Keep Call bell within reach  - Keep bed low and locked with side rails adjusted as appropriate  - Keep care items and personal belongings within reach  - Initiate and maintain comfort rounds  - Make Fall Risk Sign visible to staff  - Offer Toileting every  Hours, in advance of need  - Initiate/Maintain alarm  - Obtain necessary fall risk management equipment:   - Apply yellow socks and bracelet for high fall risk patients  - Consider moving patient to room near nurses station  Outcome: Adequate for Discharge  Goal: Maintain or return to baseline ADL function  Description: INTERVENTIONS:  -  Assess patient's ability to carry out ADLs; assess patient's baseline for ADL function and identify physical deficits which impact ability to perform ADLs (bathing, care of mouth/teeth, toileting, grooming, dressing, etc.)  - Assess/evaluate cause of self-care deficits   - Assess range of motion  - Assess patient's mobility; develop plan if impaired  - Assess patient's need for assistive devices and provide as appropriate  - Encourage maximum independence but intervene and supervise when necessary  - Involve family in performance of ADLs  - Assess for home care needs following discharge   - Consider OT consult to assist with ADL evaluation and planning for discharge  - Provide patient education as appropriate  Outcome: Adequate for Discharge  Goal: Maintains/Returns to pre admission functional level  Description: INTERVENTIONS:  - Perform AM-PAC 6 Click Basic Mobility/ Daily Activity assessment daily.  - Set and communicate daily mobility goal to care team and patient/family/caregiver. - Collaborate with rehabilitation services on mobility goals if consulted  - Perform Range of Motion  times a day. - Reposition patient every  hours. - Dangle patient  times a day  - Stand patient  times a day  - Ambulate patient  times a day  - Out of bed to chair  times a day   - Out of bed for meals times a day  - Out of bed for toileting  - Record patient progress and toleration of activity level   Outcome: Adequate for Discharge     Problem: DISCHARGE PLANNING  Goal: Discharge to home or other facility with appropriate resources  Description: INTERVENTIONS:  - Identify barriers to discharge w/patient and caregiver  - Arrange for needed discharge resources and transportation as appropriate  - Identify discharge learning needs (meds, wound care, etc.)  - Arrange for interpretive services to assist at discharge as needed  - Refer to Case Management Department for coordinating discharge planning if the patient needs post-hospital services based on physician/advanced practitioner order or complex needs related to functional status, cognitive ability, or social support system  Outcome: Adequate for Discharge     Problem: Knowledge Deficit  Goal: Patient/family/caregiver demonstrates understanding of disease process, treatment plan, medications, and discharge instructions  Description: Complete learning assessment and assess knowledge base.   Interventions:  - Provide teaching at level of understanding  - Provide teaching via preferred learning methods  Outcome: Adequate for Discharge     Problem: Nutrition/Hydration-ADULT  Goal: Nutrient/Hydration intake appropriate for improving, restoring or maintaining nutritional needs  Description: Monitor and assess patient's nutrition/hydration status for malnutrition. Collaborate with interdisciplinary team and initiate plan and interventions as ordered. Monitor patient's weight and dietary intake as ordered or per policy. Utilize nutrition screening tool and intervene as necessary. Determine patient's food preferences and provide high-protein, high-caloric foods as appropriate.      INTERVENTIONS:  - Monitor oral intake, urinary output, labs, and treatment plans  - Assess nutrition and hydration status and recommend course of action  - Evaluate amount of meals eaten  - Assist patient with eating if necessary   - Allow adequate time for meals  - Recommend/ encourage appropriate diets, oral nutritional supplements, and vitamin/mineral supplements  - Order, calculate, and assess calorie counts as needed  - Recommend, monitor, and adjust tube feedings and TPN/PPN based on assessed needs  - Assess need for intravenous fluids  - Provide specific nutrition/hydration education as appropriate  - Include patient/family/caregiver in decisions related to nutrition  Outcome: Adequate for Discharge     Problem: Prexisting or High Potential for Compromised Skin Integrity  Goal: Skin integrity is maintained or improved  Description: INTERVENTIONS:  - Identify patients at risk for skin breakdown  - Assess and monitor skin integrity  - Assess and monitor nutrition and hydration status  - Monitor labs   - Assess for incontinence   - Turn and reposition patient  - Assist with mobility/ambulation  - Relieve pressure over bony prominences  - Avoid friction and shearing  - Provide appropriate hygiene as needed including keeping skin clean and dry  - Evaluate need for skin moisturizer/barrier cream  - Collaborate with interdisciplinary team   - Patient/family teaching  - Consider wound care consult   Outcome: Adequate for Discharge

## 2023-12-01 NOTE — PLAN OF CARE
Problem: PHYSICAL THERAPY ADULT  Goal: Performs mobility at highest level of function for planned discharge setting. See evaluation for individualized goals. Description: Treatment/Interventions: ADL retraining, LE strengthening/ROM, Functional transfer training, Therapeutic exercise, Endurance training, Patient/family training, Equipment eval/education, Bed mobility, Gait training, Compensatory technique education, Spoke to MD, Spoke to nursing, Spoke to case management, Spoke to advanced practitioner, OT, Family  Equipment Recommended: Alisha Davalos       See flowsheet documentation for full assessment, interventions and recommendations. Outcome: Progressing  Note: Prognosis: Fair  Problem List: Decreased strength, Decreased range of motion, Decreased endurance, Impaired balance, Decreased mobility, Decreased coordination, Pain  Assessment: pt demonstrated improved functional strength, endurance, and overall mobitliy this session. Performed all transfers with decreased assist, then ambulated up to household distances without LOB despite gait deviations as noted above. She did require assist for RW management, as well as instructions to maintain proximity to AD, which may be habituatl given baseline rollator use. Pt reached her self appointed goals when prompted, which gave her satisfaction with progress today as well. Further gait trials deferred so as not to exhaust pt's energy reserves since this was her first extended gait trial in recent days without onset of worsening neurological deficits that limited her in previous trials. PT will continue to follow and treat as appropriate to address functional deficits & ensure safe return to PLOF. Rehab Resource Intensity Level, PT: II (Moderate Resource Intensity)    See flowsheet documentation for full assessment.

## 2023-12-04 ENCOUNTER — TRANSITIONAL CARE MANAGEMENT (OUTPATIENT)
Dept: FAMILY MEDICINE CLINIC | Facility: CLINIC | Age: 78
End: 2023-12-04

## 2023-12-04 NOTE — TELEPHONE ENCOUNTER
Called rehab, was transferred to unit patient was on, phone rang for a few minutes and then disconnected.  Will try again later

## 2023-12-04 NOTE — ED ATTENDING ATTESTATION
Final Diagnoses:     1. Stroke-like symptoms    2. SADAF (acute kidney injury) ([address])    3. Hyponatremia    4. Hyperkalemia    5. Urinary retention    6. Hematuria    7. Post laminectomy syndrome    8. Lumbar back pain           IMeaghan DO, saw and evaluated the patient. All available labs and X-rays were ordered by me or the resident / non-physician and have been reviewed by myself. I discussed the patient with the resident / non-physician and agree with the resident's / non-physician practitioner's findings and plan as documented in the resident's / non-physician practicitioner's note, except where noted. At this point, I agree with the current assessment done in the ED. I was present during key portions of all procedures performed unless otherwise stated. Nursing Triage:     Chief Complaint   Patient presents with    STROKE Alert       HPI:   This is a 78 y.o. female presenting for evaluation of stroke. Prehospital stroke alrt. Onset 7 hours ago possibly. Left hand/left face numbness. ASSESSMENT + PLAN:   Stroke alert  Diff dx includes cva, tia, ich, metabolic toxic encephalopthy  Ct, neuro consult    Physical:     Vital signs reviewed. Gen. appearance: No acute distress. Alert and oriented x3. Head: Atraumatic, normocephalic. Eyes: EOMI, PERRLA. No icterus. Neck: Supple, no lymphadenopathy, full range of motion. Chest: Regular rate and rhythm. Lungs are clear to auscultation bilaterally. Nontender. Abdomen: Soft nontender nondistended. No signs of ecchymosis. Positive bowel sounds. Extremities: Full range of motion in all extremities. DTRs intact. Neuro: Cranial nerves II through XII intact. Nonfocal exam. GCS 15  Skin: Warm, dry. Vital signs reviewed.             Past Medical:    has a past medical history of Acid reflux, Anemia, Anxiety, Arthritis, Asthma, Basal cell carcinoma, Chronic narcotic dependence (HCC), Chronic pain, Colon polyp, Cystocele, Diabetes mellitus ([address]), Disease of thyroid gland, Diverticulosis, Dizziness, Dysfunctional uterine bleeding, Dysphagia, Fibromyalgia, Gastric ulcer, Gastroparesis, History of colonic polyps, History of gastroesophageal reflux (GERD), Hypercholesterolemia, Hyperlipidemia, Hypertension, IBS (irritable bowel syndrome), Pneumobilia (06/18/2022), Post laminectomy syndrome, S/P insertion of spinal cord stimulator (07/18/2018), Seasonal allergies, Shortness of breath, Spinal stenosis, Status post lumbar spinal fusion (03/16/2018), and Stroke ([address] [facility]). Past Surgical:    has a past surgical history that includes Appendectomy; Cholecystectomy; Hernia repair; Laminectomy; pr dilation esoph unguided sound/bougie 1/mult pass (N/A, 09/28/2016); Esophagogastroduodenoscopy (N/A, 09/28/2016); pr esophagogastroduodenoscopy transoral diagnostic (N/A, 07/18/2016); pr colonoscopy flx dx w/collj spec when pfrmd (N/A, 03/02/2016); Lumbar laminectomy; Back surgery; pr arthrodesis posterior/pstlat tq 1ntrspc thoracic (N/A, 06/04/2018); pr insj/rplcmt spi npgr dir/induxive coupling (Left, 06/04/2018); Hysterectomy; Oophorectomy; Colonoscopy; Breast cyst excision (Left); Cardiac catheterization (11/14/2023); and Cardiac catheterization (N/A, 11/14/2023). US kidney and bladder   Final Result by Chet Wolf MD (12/01 0019)      No suspicious renal findings. Workstation performed: [id number]         CT spine lumbar wo contrast   Final Result by BOBBY Camarillo MD (11/30 8632)   Stable postsurgical changes status post fusion from T12-L5 with laminectomies from L1-L4. Degenerative spondylosis with multilevel neuroforaminal stenosis. Mild canal stenosis at L4-5 and L5-S1. Workstation performed: [id number]         CT head wo contrast   Final Result by Bindu Diego MD (11/27 2015)      No acute intracranial abnormality. Stable chronic microangiopathic changes within the brain.       A few secretions in the left sphenoid sinus which can represent acute sinusitis. Workstation performed: [id number]         CTA dissection protocol chest/abdomen/pelvis   Final Result by Giles Raman MD (11/26 6526)      1. No acute aortic dissection. 2.  Moderate fecal stasis               Workstation performed: [id number]         CTA stroke alert (head/neck)   Final Result by Jamia Santana MD (11/26 1444)   1. CTA head: Stable moderate stenosis at the distal right MCA M1 segment compared to the prior 10/17/2023 examination. Unchanged focal moderate stenosis involving the inferior basilar artery with infundibular dilatation at the right AICA origin. 2. CTA neck: Moderate right extracranial ICA stenosis. The cervical vertebral arteries are patent. Findings were directly discussed with Dr. Kim Guerrero at 2:40 p.m. Workstation performed: [id number]         CT stroke alert brain   Final Result by Jamia Santana MD (11/26 7503)      No acute intracranial abnormality. Chronic microangiopathic ischemic changes. Findings were directly discussed with Dr. Kim Guerrero at 2:40 p.m. Workstation performed: [id number]             I reviewed patient's most recent discharge summary and/or outpatient office notes. Portions of the record may have been created with voice recognition software. Occasional wrong word or "sound a like" substitutions may have occurred due to the inherent limitations of voice recognition software. Read the chart carefully and recognize, using context, where substitutions have occurred.     Electronically signed:  Vivian Carvalho DO

## 2023-12-07 NOTE — TELEPHONE ENCOUNTER
Called nursing at [facility] again and they asked to have orders faxed to : [phone] attn: 1 st floor nursing for a void trial

## 2024-01-06 PROBLEM — E66.01 OBESITY, MORBID (HCC): Status: RESOLVED | Noted: 2021-05-17 | Resolved: 2024-01-06

## 2024-01-06 PROBLEM — E66.9 OBESITY, CLASS I, BMI 30-34.9: Status: ACTIVE | Noted: 2024-01-06

## 2024-01-06 PROBLEM — E66.811 OBESITY, CLASS I, BMI 30-34.9: Status: ACTIVE | Noted: 2024-01-06

## 2024-01-06 PROBLEM — Q24.8 LEFT VENTRICULAR OUTFLOW TRACT OBSTRUCTION: Status: ACTIVE | Noted: 2024-01-06

## 2024-01-10 ENCOUNTER — LAB REQUISITION (OUTPATIENT)
Dept: LAB | Facility: HOSPITAL | Age: 79
End: 2024-01-10
Payer: MEDICARE

## 2024-01-10 ENCOUNTER — OFFICE VISIT (OUTPATIENT)
Dept: CARDIOLOGY CLINIC | Facility: CLINIC | Age: 79
End: 2024-01-10
Payer: MEDICARE

## 2024-01-10 VITALS
BODY MASS INDEX: 29.29 KG/M2 | DIASTOLIC BLOOD PRESSURE: 64 MMHG | SYSTOLIC BLOOD PRESSURE: 124 MMHG | HEART RATE: 84 BPM | WEIGHT: 150 LBS

## 2024-01-10 DIAGNOSIS — Q24.8 LEFT VENTRICULAR OUTFLOW TRACT OBSTRUCTION: ICD-10-CM

## 2024-01-10 DIAGNOSIS — K21.9 GASTROESOPHAGEAL REFLUX DISEASE WITHOUT ESOPHAGITIS: ICD-10-CM

## 2024-01-10 DIAGNOSIS — D50.8 IRON DEFICIENCY ANEMIA SECONDARY TO INADEQUATE DIETARY IRON INTAKE: ICD-10-CM

## 2024-01-10 DIAGNOSIS — E66.9 OBESITY, CLASS I, BMI 30-34.9: ICD-10-CM

## 2024-01-10 DIAGNOSIS — D64.9 ANEMIA, UNSPECIFIED TYPE: ICD-10-CM

## 2024-01-10 DIAGNOSIS — N18.31 STAGE 3A CHRONIC KIDNEY DISEASE (HCC): ICD-10-CM

## 2024-01-10 DIAGNOSIS — R60.0 BILATERAL LOWER EXTREMITY EDEMA: Chronic | ICD-10-CM

## 2024-01-10 DIAGNOSIS — R06.02 EXERTIONAL SHORTNESS OF BREATH: ICD-10-CM

## 2024-01-10 DIAGNOSIS — J44.9 CHRONIC OBSTRUCTIVE PULMONARY DISEASE, UNSPECIFIED COPD TYPE (HCC): ICD-10-CM

## 2024-01-10 DIAGNOSIS — D64.9 NORMOCYTIC ANEMIA: Chronic | ICD-10-CM

## 2024-01-10 DIAGNOSIS — R29.90 STROKE-LIKE SYMPTOMS: Primary | ICD-10-CM

## 2024-01-10 DIAGNOSIS — E11.628 TYPE 2 DIABETES MELLITUS WITH OTHER SKIN COMPLICATIONS (HCC): ICD-10-CM

## 2024-01-10 DIAGNOSIS — D64.9 ANEMIA, UNSPECIFIED: ICD-10-CM

## 2024-01-10 DIAGNOSIS — I25.10 CORONARY ARTERY DISEASE INVOLVING NATIVE CORONARY ARTERY OF NATIVE HEART WITHOUT ANGINA PECTORIS: ICD-10-CM

## 2024-01-10 DIAGNOSIS — J45.20 MILD INTERMITTENT ASTHMA WITHOUT COMPLICATION: ICD-10-CM

## 2024-01-10 DIAGNOSIS — E78.00 HYPERCHOLESTEROLEMIA: ICD-10-CM

## 2024-01-10 DIAGNOSIS — I63.9 CEREBROVASCULAR ACCIDENT (CVA), UNSPECIFIED MECHANISM (HCC): ICD-10-CM

## 2024-01-10 DIAGNOSIS — N17.9 ACUTE KIDNEY INJURY (HCC): ICD-10-CM

## 2024-01-10 DIAGNOSIS — Z96.89 S/P INSERTION OF SPINAL CORD STIMULATOR: ICD-10-CM

## 2024-01-10 DIAGNOSIS — N32.81 OVERACTIVE BLADDER: ICD-10-CM

## 2024-01-10 DIAGNOSIS — I10 PRIMARY HYPERTENSION: ICD-10-CM

## 2024-01-10 LAB
ANION GAP SERPL CALCULATED.3IONS-SCNC: 13 MMOL/L
BACTERIA UR QL AUTO: ABNORMAL /HPF
BASOPHILS # BLD AUTO: 0.03 THOUSANDS/ÂΜL (ref 0–0.1)
BASOPHILS NFR BLD AUTO: 1 % (ref 0–1)
BILIRUB UR QL STRIP: ABNORMAL
BUN SERPL-MCNC: 25 MG/DL (ref 5–25)
CALCIUM SERPL-MCNC: 8.5 MG/DL (ref 8.4–10.2)
CHLORIDE SERPL-SCNC: 98 MMOL/L (ref 96–108)
CLARITY UR: ABNORMAL
CO2 SERPL-SCNC: 24 MMOL/L (ref 21–32)
COLOR UR: ABNORMAL
CREAT SERPL-MCNC: 1.7 MG/DL (ref 0.6–1.3)
EOSINOPHIL # BLD AUTO: 0.16 THOUSAND/ÂΜL (ref 0–0.61)
EOSINOPHIL NFR BLD AUTO: 3 % (ref 0–6)
ERYTHROCYTE [DISTWIDTH] IN BLOOD BY AUTOMATED COUNT: 14.6 % (ref 11.6–15.1)
GFR SERPL CREATININE-BSD FRML MDRD: 28 ML/MIN/1.73SQ M
GLUCOSE SERPL-MCNC: 114 MG/DL (ref 65–140)
GLUCOSE UR STRIP-MCNC: NEGATIVE MG/DL
HCT VFR BLD AUTO: 34.9 % (ref 34.8–46.1)
HGB BLD-MCNC: 11.2 G/DL (ref 11.5–15.4)
HGB UR QL STRIP.AUTO: NEGATIVE
IMM GRANULOCYTES # BLD AUTO: 0.02 THOUSAND/UL (ref 0–0.2)
IMM GRANULOCYTES NFR BLD AUTO: 0 % (ref 0–2)
KETONES UR STRIP-MCNC: NEGATIVE MG/DL
LEUKOCYTE ESTERASE UR QL STRIP: NEGATIVE
LYMPHOCYTES # BLD AUTO: 1.29 THOUSANDS/ÂΜL (ref 0.6–4.47)
LYMPHOCYTES NFR BLD AUTO: 27 % (ref 14–44)
MCH RBC QN AUTO: 30.3 PG (ref 26.8–34.3)
MCHC RBC AUTO-ENTMCNC: 32.1 G/DL (ref 31.4–37.4)
MCV RBC AUTO: 94 FL (ref 82–98)
MONOCYTES # BLD AUTO: 0.65 THOUSAND/ÂΜL (ref 0.17–1.22)
MONOCYTES NFR BLD AUTO: 14 % (ref 4–12)
NEUTROPHILS # BLD AUTO: 2.67 THOUSANDS/ÂΜL (ref 1.85–7.62)
NEUTS SEG NFR BLD AUTO: 55 % (ref 43–75)
NITRITE UR QL STRIP: POSITIVE
NON-SQ EPI CELLS URNS QL MICRO: ABNORMAL /HPF
NRBC BLD AUTO-RTO: 0 /100 WBCS
PH UR STRIP.AUTO: 5.5 [PH]
PLATELET # BLD AUTO: 279 THOUSANDS/UL (ref 149–390)
PMV BLD AUTO: 11.2 FL (ref 8.9–12.7)
POTASSIUM SERPL-SCNC: 4.5 MMOL/L (ref 3.5–5.3)
PROT UR STRIP-MCNC: ABNORMAL MG/DL
RBC # BLD AUTO: 3.7 MILLION/UL (ref 3.81–5.12)
RBC #/AREA URNS AUTO: ABNORMAL /HPF
SODIUM SERPL-SCNC: 135 MMOL/L (ref 135–147)
SP GR UR STRIP.AUTO: 1.02 (ref 1–1.03)
UROBILINOGEN UR STRIP-ACNC: 2 MG/DL
WBC # BLD AUTO: 4.82 THOUSAND/UL (ref 4.31–10.16)
WBC #/AREA URNS AUTO: ABNORMAL /HPF

## 2024-01-10 PROCEDURE — 87077 CULTURE AEROBIC IDENTIFY: CPT | Performed by: FAMILY MEDICINE

## 2024-01-10 PROCEDURE — 80048 BASIC METABOLIC PNL TOTAL CA: CPT | Performed by: FAMILY MEDICINE

## 2024-01-10 PROCEDURE — 85025 COMPLETE CBC W/AUTO DIFF WBC: CPT | Performed by: FAMILY MEDICINE

## 2024-01-10 PROCEDURE — 81001 URINALYSIS AUTO W/SCOPE: CPT | Performed by: FAMILY MEDICINE

## 2024-01-10 PROCEDURE — 93000 ELECTROCARDIOGRAM COMPLETE: CPT | Performed by: INTERNAL MEDICINE

## 2024-01-10 PROCEDURE — 87147 CULTURE TYPE IMMUNOLOGIC: CPT | Performed by: FAMILY MEDICINE

## 2024-01-10 PROCEDURE — 87186 SC STD MICRODIL/AGAR DIL: CPT | Performed by: FAMILY MEDICINE

## 2024-01-10 PROCEDURE — 87086 URINE CULTURE/COLONY COUNT: CPT | Performed by: FAMILY MEDICINE

## 2024-01-10 PROCEDURE — 99214 OFFICE O/P EST MOD 30 MIN: CPT | Performed by: INTERNAL MEDICINE

## 2024-01-10 RX ORDER — PANTOPRAZOLE SODIUM 40 MG/1
40 TABLET, DELAYED RELEASE ORAL DAILY
Start: 2024-01-10 | End: 2024-04-09

## 2024-01-10 RX ORDER — FERROUS SULFATE 325(65) MG
325 TABLET ORAL
Qty: 90 TABLET | Refills: 0 | Status: SHIPPED | OUTPATIENT
Start: 2024-01-10

## 2024-01-10 NOTE — PROGRESS NOTES
[facility]  [address] [place] [address]  Phone#  [phone]   Fax#  [phone]  *-*-*-*-*-*-*-*-*-*-*-*-*-*-*-*-*-*-*-*-*-*-*-*-*-*-*-*-*-*-*-*-*-*-*-*-*-*-*-*-*-*-*-*-*-*-*-*-*-*-*-*-*-*                                              Cardiology Consultation       ENCOUNTER DATE: 01/10/24 9:28 AM  PATIENT NAME: Mattie Varela   : 1945    MRN: [id number]  AGE:78 y.o.      SEX: female  ENCOUNTER PROVIDER:Paco Boyer MD     PRIMARY CARE PHYSICIAN: NANY Pollock    ACTIVE DIAGNOSIS THIS VISIT  1. Stroke-like symptoms  POCT ECG      2. Left ventricular outflow tract obstruction        3. Hypercholesterolemia        4. Coronary artery disease involving native coronary artery of native heart without angina pectoris  POCT ECG      5. Exertional shortness of breath        6. S/P insertion of spinal cord stimulator        7. Obesity, Class I, BMI 30-34.9        8. Normocytic anemia        9. Iron deficiency anemia secondary to inadequate dietary iron intake  CBC and differential      10. Mild intermittent asthma without complication        11. Primary hypertension        12. Chronic obstructive pulmonary disease, unspecified COPD type (HCC)        13. Cerebrovascular accident (CVA), unspecified mechanism (HCC)        14. Bilateral lower extremity edema        15. Acute kidney injury (HCC)        16. Anemia, unspecified type  ferrous sulfate 325 (65 Fe) mg tablet      17. Gastroesophageal reflux disease without esophagitis  pantoprazole (PROTONIX) 40 mg tablet      18. Stage 3a chronic kidney disease (HCC)        19. Type 2 diabetes mellitus with other skin complications (HCC)          ACTIVE PROBLEM LIST  Patient Active Problem List   Diagnosis    Failed back surgical syndrome    Degenerative lumbar spinal stenosis    Type 2 diabetes mellitus with other skin complications (HCC)    Chronic pain disorder    Dyspepsia    Glaucoma    Hypertension    Iron deficiency anemia  secondary to inadequate dietary iron intake    Anxiety    Hypercholesterolemia    Hypothyroidism    Overactive bladder    S/P insertion of spinal cord stimulator    Mild intermittent asthma without complication    Prepyloric ulcer    Diarrhea    Stroke-like symptoms    Acute kidney injury (HCC)    Cerebrovascular accident (CVA), unspecified mechanism ([facility])    Confusion with body shakes and blank stare    Normocytic anemia    Bilateral lower extremity edema    Chronic obstructive pulmonary disease, unspecified COPD type ([facility])    Abnormal urinalysis    History of lumbar surgery    Non obstructive CAD    SADAF (acute kidney injury) ([facility])    Tremor    Exertional shortness of breath    Urinary retention    Left ventricular outflow tract obstruction    Obesity, Class I, BMI 30-34.9    Stage 3a chronic kidney disease ([facility])       CARDIOLOGY SPECIALTY COMMENTS  8/26-8/28/2023 [facility]: Ambulatory dysfunction    11/10-11/15/2023 presented with shortness of breath and oxygen desaturation.  Reports that she had been having symptoms suggestive of angina pectoris.  CAT scan demonstrated calcification particularly in the LAD system.  Acute kidney injury along with contrast allergy, patient scheduled for VQ scan.  Cardiac catheterization demonstrated first OM 70% IFR not significant.    11/12/2023 echocardiogram: Normal left ventricular systolic function, EF 70%.  No diagnostic regional wall motion abnormalities, grade 1 diastolic dysfunction.  Outflow tract obstruction with Valsalva with peak gradient of 117 mmHg mild LAE and mild MAC    11/14/2023 cardiac catheterization: Diffuse calcified CAD without focal lesions.  First obtuse marginal 70% stenosis.  iFR demonstrated the lesion to be nonsignificant    11/18/2023  [facility] generalized weakness    11/26-12/1/2023 [facility]: Strokelike symptoms.  CTA demonstrated stable moderate stenosis of distal right MCA M1 segment unchanged from prior examination focal moderate  stenosis involving the inferior basilar artery with infundibular dilatation at the right a ICA origin.  Moderate right external carotid artery stenosis.  Patent vertebral arteries.  Unable to have MRI due to spinal cord stimulator.  Neurology recommended aspirin and Plavix for 90 days and then aspirin monotherapy    Dyspnea on exertion probable dehydration rather than heart failure (may also be deconditioning, anemia and outflow tract obstruction with gradient noted), tremor, SADAF, COPD, status post spinal cord stimulator insertion, overactive bladder, anxiety, iron deficiency anemia secondary to in adequate iron intake, hypertension, chronic pain disorder, and type 2 diabetes mellitus      HPI:        Patient recently hospitalized at [facility] with strokelike symptoms.  She was recommended to take dual antiplatelet therapy for an 90 days followed by monotherapy which originally aspirin was recommended but patient is presently taking Plavix without aspirin.  Plavix with aspirin has been demonstrated to be safer because the aspirin is a gastric irritant leading to GI bleeding.    Patient was referred to cardiology for arrangement of a Zio patch monitor or loop recorder.  I believe patient would be better off with a loop recorder.    She denies chest discomfort.  She denies shortness of breath.  She has a substantial anemia which appears to be an iron deficiency.  An echocardiogram in the hospital demonstrated no left ventricular outflow tract resting gradient but with Valsalva maneuver the patient developed a peak gradient of 117 mmHg.  Further investigation would require a supine bicycle exercise echocardiogram and I am not sure patient could cooperate to have the procedure.  In addition since she is very inactive, it probably is not clinically significant.    Patient denies chest pain.  Under the circumstances, I am not sure she needs to take Ranexa.  Will discontinue Ranexa but if chest discomfort  returns, it certainly can be resumed.    DISCUSSION/PLAN:         Arrangements to schedule loop recorder at [facility] to rule out atrial fibrillation made and is in the planning.  Patient to resume iron sulfate 1 tablet daily with breakfast  Patient to have a CBC in 3 months prior to her return visit  If patient continues to have anemia will consider referral to hematologist.  Return in 3 months.    Lab Studies:    Lab Results   Component Value Date    CHOLESTEROL 120 11/26/2023    CHOLESTEROL 129 11/11/2023    CHOLESTEROL 132 06/09/2023     Lab Results   Component Value Date    TRIG 179 (H) 11/26/2023    TRIG 84 11/11/2023    TRIG 166 (H) 06/09/2023     Lab Results   Component Value Date    HDL 42 (L) 11/26/2023    HDL 50 11/11/2023    HDL 48 (L) 06/09/2023     Lab Results   Component Value Date    LDLCALC 42 11/26/2023    LDLCALC 62 11/11/2023    LDLCALC 51 06/09/2023     Lab Results   Component Value Date    HGBA1C 6.5 (H) 11/26/2023      Lab Results   Component Value Date    EGFR 45 12/01/2023    EGFR 39 11/30/2023    EGFR 30 11/29/2023    SODIUM 139 12/01/2023    SODIUM 137 11/30/2023    SODIUM 135 11/29/2023    K 4.1 12/01/2023    K 4.4 11/30/2023    K 4.3 11/29/2023     12/01/2023     11/30/2023     11/29/2023    CO2 24 12/01/2023    CO2 26 11/30/2023    CO2 25 11/29/2023    ANIONGAP 9 04/30/2015    ANIONGAP 10 03/20/2014    BUN 16 12/01/2023    BUN 20 11/30/2023    BUN 24 11/29/2023    CREATININE 1.14 12/01/2023    CREATININE 1.28 11/30/2023    CREATININE 1.60 (H) 11/29/2023     Lab Results   Component Value Date    WBC 6.46 11/29/2023    WBC 7.12 11/28/2023    WBC 7.26 11/27/2023    HGB 8.2 (L) 12/01/2023    HGB 8.3 (L) 11/29/2023    HGB 8.7 (L) 11/28/2023    HCT 26.5 (L) 12/01/2023    HCT 26.3 (L) 11/29/2023    HCT 26.8 (L) 11/28/2023    MCV 93 11/29/2023    MCV 90 11/28/2023    MCV 91 11/27/2023    MCH 29.2 11/29/2023    MCH 29.2 11/28/2023    MCH 28.7 11/27/2023    MCHC 31.6 11/29/2023     MCHC 32.5 11/28/2023    MCHC 31.7 11/27/2023     11/29/2023     11/28/2023     11/27/2023        Lab Results   Component Value Date    GLUCOSE 193 (H) 06/04/2018    GLUCOSE 179 (H) 06/04/2018    GLUCOSE 202 (H) 06/04/2018    CALCIUM 8.5 12/01/2023    CALCIUM 8.5 11/30/2023    CALCIUM 7.6 (L) 11/29/2023    AST 21 11/27/2023    AST 18 11/18/2023    AST 14 11/10/2023    ALT 9 11/27/2023    ALT 11 11/18/2023    ALT 8 11/10/2023    ALKPHOS 56 11/27/2023    ALKPHOS 73 11/18/2023    ALKPHOS 84 11/10/2023    PROT 7.1 04/30/2015    PROT 7.2 03/20/2014    BILITOT 0.4 04/30/2015    BILITOT 0.2 03/20/2014    MG 1.6 (L) 11/13/2023    MG 1.5 (L) 11/11/2023    MG 2.3 02/23/2023     Lab Results   Component Value Date    TROPONINI 0.04 07/16/2020    TROPONINI 0.06 (H) 07/15/2020     Lab Results   Component Value Date    DDIMER 0.86 (H) 11/10/2023       Lab Results   Component Value Date    FERRITIN 21 11/10/2023    IRON 31 (L) 11/10/2023    TIBC 331 11/10/2023     Results for orders placed or performed in visit on 01/10/24   POCT ECG    Narrative    Normal sinus rhythm at a rate of 84 bpm.  Left bundle branch block pattern with left axis deviation and secondary ST-T wave abnormalities.  Abnormal EKG.         Current Outpatient Medications:     albuterol (PROVENTIL HFA,VENTOLIN HFA) 90 mcg/act inhaler, Inhale 2 puffs every 6 (six) hours as needed for wheezing or shortness of breath, Disp: 6.7 g, Rfl: 5    atorvastatin (LIPITOR) 40 mg tablet, TAKE ONE TABLET BY MOUTH ONCE DAILY, Disp: 90 tablet, Rfl: 0    Blood Glucose Monitoring Suppl (OneTouch Verio Reflect) w/Device KIT, Check blood sugars twice daily. Please substitute with appropriate alternative as covered by patient's insurance. Dx: E11.65, Disp: 1 kit, Rfl: 0    cholestyramine (QUESTRAN) 4 g packet, Take 1 packet (4 g total) by mouth 3 (three) times a day with meals, Disp: 60 packet, Rfl: 2    clopidogrel (Plavix) 75 mg tablet, Take 1 tablet (75 mg  total) by mouth daily, Disp: 90 tablet, Rfl: 0    escitalopram (Lexapro) 20 mg tablet, Take 0.5 tablets (10 mg total) by mouth daily, Disp: 30 tablet, Rfl: 5    ferrous sulfate 325 (65 Fe) mg tablet, Take 1 tablet (325 mg total) by mouth daily with breakfast, Disp: 90 tablet, Rfl: 0    glucose blood (OneTouch Verio) test strip, Check blood sugars twice daily. Please substitute with appropriate alternative as covered by patient's insurance. Dx: E11.65, Disp: 200 each, Rfl: 3    levothyroxine 25 mcg tablet, Take 1.5 tablets (37.5 mcg total) by mouth daily, Disp: 45 tablet, Rfl: 4    meclizine (ANTIVERT) 25 mg tablet, Take 1 tablet (25 mg total) by mouth 4 (four) times a day as needed for dizziness, Disp: 60 tablet, Rfl: 0    metFORMIN (GLUCOPHAGE) 1000 MG tablet, Take 1 tablet (1,000 mg total) by mouth 2 (two) times a day with meals, Disp: 180 tablet, Rfl: 3    metoprolol succinate (TOPROL-XL) 50 mg 24 hr tablet, Take 1 tablet (50 mg total) by mouth every 12 (twelve) hours, Disp: 60 tablet, Rfl: 0    Milnacipran HCl (Savella) 100 MG TABS, Take 1 tablet (100 mg total) by mouth daily, Disp: 30 tablet, Rfl: 3    OneTouch Delica Lancets 33G MISC, Check blood sugars twice daily. Please substitute with appropriate alternative as covered by patient's insurance. Dx: E11.65, Disp: 200 each, Rfl: 3    pantoprazole (PROTONIX) 40 mg tablet, Take 1 tablet (40 mg total) by mouth daily, Disp: , Rfl:     trospium (SANCTURA XR) 60 mg 24 hr capsule, Take 1 capsule (60 mg total) by mouth daily before breakfast, Disp: 30 capsule, Rfl: 3  Allergies   Allergen Reactions    Penicillins Anaphylaxis, Hives and Other (See Comments)     Other reaction(s): Unknown Reaction    Sulfa Antibiotics Anaphylaxis and Other (See Comments)     Other reaction(s): Unknown Reaction    Aspartame - Food Allergy Other (See Comments) and Hypertension     Slurred speech, weakness, stroke sx    Iodinated Contrast Media Hives     Pt has taken prep prior for  contrast and has not had any break through reaction    Keflex [Cephalexin] Hives       Past Medical History:   Diagnosis Date    Acid reflux     Anemia     hx of iron-deficient    Anxiety     Arthritis     Asthma     last needed inhaler last 2020    Basal cell carcinoma     upper lip    Chronic narcotic dependence (HCC)     Chronic pain     Colon polyp     Cystocele     Diabetes mellitus (HCC)     stable    Disease of thyroid gland     hypothyroidism    Diverticulosis     Dizziness     at times    Dysfunctional uterine bleeding     last assessed - [id number]    Dysphagia     Fibromyalgia     Gastric ulcer     Gastroparesis     History of colonic polyps     last assessed - [id number]    History of gastroesophageal reflux (GERD)     Hypercholesterolemia     Hyperlipidemia     Hypertension     IBS (irritable bowel syndrome)     Pneumobilia 2022    Post laminectomy syndrome     S/P insertion of spinal cord stimulator 2018    Seasonal allergies     Shortness of breath     exertional    Spinal stenosis     Status post lumbar spinal fusion 2018    Stroke ([facility])     pt states slight stroke 2022     Social History     Socioeconomic History    Marital status:      Spouse name: Not on file    Number of children: Not on file    Years of education: Not on file    Highest education level: Not on file   Occupational History    Occupation: Retired   Tobacco Use    Smoking status: Former     Current packs/day: 0.00     Types: Cigarettes     Quit date: 1970     Years since quittin.0    Smokeless tobacco: Never    Tobacco comments:     Denied history of current ever day smoker, Former smoker and Never smoker all documented in [employer]   Vaping Use    Vaping status: Not on file   Substance and Sexual Activity    Alcohol use: Not Currently     Comment: Denied history of alcohol use    Drug use: No     Comment: Denied history of drug use    Sexual activity: Not Currently   Other Topics Concern     Not on file   Social History Narrative    Marital History - Currently  per [employer]     Social Determinants of Health     Financial Resource Strain: Medium Risk (8/9/2022)    Overall Financial Resource Strain (CARDIA)     Difficulty of Paying Living Expenses: Somewhat hard   Food Insecurity: No Food Insecurity (11/12/2023)    Hunger Vital Sign     Worried About Running Out of Food in the Last Year: Never true     Ran Out of Food in the Last Year: Never true   Transportation Needs: No Transportation Needs (11/12/2023)    PRAPARE - Transportation     Lack of Transportation (Medical): No     Lack of Transportation (Non-Medical): No   Physical Activity: Not on file   Stress: Not on file   Social Connections: Not on file   Intimate Partner Violence: Not on file   Housing Stability: Low Risk  (11/12/2023)    Housing Stability Vital Sign     Unable to Pay for Housing in the Last Year: No     Number of Places Lived in the Last Year: 1     Unstable Housing in the Last Year: No      Family History   Problem Relation Age of Onset    Lung cancer Mother 46    Pulmonary embolism Father     No Known Problems Sister     No Known Problems Daughter     No Known Problems Daughter     Stroke Maternal Grandmother     Heart attack Maternal Grandfather     No Known Problems Paternal Grandmother     No Known Problems Paternal Grandfather     No Known Problems Maternal Aunt     Diabetes Family         Diabetes mellitus    Hypertension Family     Stroke Family         Stroke complications     Past Surgical History:   Procedure Laterality Date    APPENDECTOMY      BACK SURGERY      BREAST CYST EXCISION Left     CARDIAC CATHETERIZATION  11/14/2023    Procedure: Cardiac catheterization;  Surgeon: Randolph Holt MD;  [facility];  Service: Cardiology    CARDIAC CATHETERIZATION N/A 11/14/2023    Procedure: Cardiac Coronary Angiogram;  Surgeon: Randolph Holt MD;  [facility];  Service:  Cardiology    CHOLECYSTECTOMY      COLONOSCOPY      ESOPHAGOGASTRODUODENOSCOPY N/A 09/28/2016    Procedure: ESOPHAGOGASTRODUODENOSCOPY (EGD);  Surgeon: Mylene Moeller MD;  Location: [facility];  Service:     HERNIA REPAIR      HYSTERECTOMY      TTAH-BSO age 28    LAMINECTOMY      LUMBAR LAMINECTOMY      OOPHORECTOMY      age 28    DC ARTHRODESIS POSTERIOR/PSTLAT TQ 1NTRSPC THORACIC N/A 06/04/2018    Procedure: Reopening of lumbar incision for T12-L5 posterior instrumented fixation and fusion and T12-L4 posterior decompression;  Surgeon: Wood Rogel MD;  Location: [facility];  Service: Neurosurgery    DC COLONOSCOPY FLX DX W/COLLJ SPEC WHEN PFRMD N/A 03/02/2016    Procedure: EGD AND COLONOSCOPY;  Surgeon: Mylene Moeller MD;  [facility]: [facility];  Service: Gastroenterology    DC DILATION ESOPH UNGUIDED SOUND/BOUGIE 1/MULT PASS N/A 09/28/2016    Procedure: DILATATION ESOPHAGEAL;  Surgeon: Mylene Moeller MD;  [facility]: [facility];  Service: Gastroenterology    DC ESOPHAGOGASTRODUODENOSCOPY TRANSORAL DIAGNOSTIC N/A 07/18/2016    Procedure: ESOPHAGOGASTRODUODENOSCOPY (EGD);  Surgeon: Mylene Moeller MD;  [facility]: [facility];  Service: Gastroenterology    DC INSJ/RPLCMT SPINAL NPG/RCVR POCKET CRTJ&CONNJ Left 06/04/2018    Procedure: removal of left buttock implantable pulse generator and placement of new  implantable pulse generator;  Surgeon: Wood Rogel MD;  [facility];  Service: Neurosurgery       Review of Systems:  Review of Systems   Constitutional: Negative.    HENT: Negative.     Respiratory:  Negative for cough, choking, chest tightness, shortness of breath, wheezing and stridor.    Cardiovascular:  Negative for chest pain, palpitations and leg swelling.   Gastrointestinal: Negative.    Endocrine: Negative.    Genitourinary: Negative.    Musculoskeletal: Negative.  Negative for gait problem.   Skin: Negative.  Negative for rash.   Allergic/Immunologic: Negative.    Neurological: Negative.  Negative for  dizziness, tremors, syncope, weakness, light-headedness, numbness and headaches.   Hematological: Negative.    Psychiatric/Behavioral: Negative.  Negative for agitation and behavioral problems. The patient is not hyperactive.        Physical Exam:  /64 (BP Location: Left arm, Patient Position: Sitting, Cuff Size: Standard)   Pulse 84   Wt 68 kg (150 lb)   LMP  (LMP Unknown)   BMI 29.29 kg/m²     PREVIOUS WEIGHTS:   Wt Readings from Last 10 Encounters:   01/10/24 68 kg (150 lb)   11/26/23 77.6 kg (171 lb 1.2 oz)   11/12/23 69.4 kg (153 lb)   09/13/23 69.4 kg (153 lb)   08/28/23 69.4 kg (153 lb)   03/08/23 82.1 kg (181 lb)   02/26/23 82.5 kg (181 lb 12.8 oz)   10/13/22 77.1 kg (170 lb)   08/24/22 79.7 kg (175 lb 9.6 oz)   08/09/22 82.6 kg (182 lb)      Physical Exam  Constitutional:       General: She is not in acute distress.     Appearance: She is well-developed.   HENT:      Head: Normocephalic and atraumatic.   Neck:      Thyroid: No thyromegaly.      Vascular: No carotid bruit or JVD.      Trachea: No tracheal deviation.   Cardiovascular:      Rate and Rhythm: Normal rate and regular rhythm.      Pulses: Normal pulses.      Heart sounds: Normal heart sounds. No murmur heard.     No friction rub. No gallop.   Pulmonary:      Effort: Pulmonary effort is normal. No respiratory distress.      Breath sounds: Normal breath sounds. No wheezing, rhonchi or rales.   Chest:      Chest wall: No tenderness.   Abdominal:      General: Bowel sounds are normal. There is no distension.      Palpations: Abdomen is soft.      Tenderness: There is no abdominal tenderness.   Musculoskeletal:         General: Normal range of motion.      Cervical back: Normal range of motion and neck supple.      Right lower leg: No edema.      Left lower leg: No edema.   Skin:     General: Skin is warm and dry.   Neurological:      Mental Status: She is alert and oriented to person, place, and time.      Gait: Gait abnormal.      Comments:  "Patient wheelchair-bound   Psychiatric:         Mood and Affect: Mood normal.         Behavior: Behavior normal.         Thought Content: Thought content normal.         Judgment: Judgment normal.       ======================================================   I have personally reviewed pertinent reports.      Portions of the record may have been created with voice recognition software. Occasional wrong word or \"sound a like\" substitutions may have occurred due to the inherent limitations of voice recognition software. Read the chart carefully and recognize, using context, where substitutions have occurred.    SIGNATURES:   Paco Boyer MD   "

## 2024-01-11 ENCOUNTER — TELEPHONE (OUTPATIENT)
Dept: CARDIOLOGY CLINIC | Facility: CLINIC | Age: 79
End: 2024-01-11

## 2024-01-11 ENCOUNTER — PREP FOR PROCEDURE (OUTPATIENT)
Dept: CARDIOLOGY CLINIC | Facility: CLINIC | Age: 79
End: 2024-01-11

## 2024-01-11 DIAGNOSIS — R29.90 STROKE-LIKE SYMPTOMS: Primary | ICD-10-CM

## 2024-01-11 NOTE — TELEPHONE ENCOUNTER
"Doretha,    Please mail instructions (LOOP Recorder Implant) to patient's home address on file.     Thanks,  Ashley \"Anya\" Willie     "

## 2024-01-11 NOTE — LETTER
"  CARDIAC LOOP RECORDER IMPLANT/EXPLANT INSTRUCTIONS   2024    Mattie Varela          : 1945        MRN: [id number]   [address] [facility] [id number]    Procedure Name: CARDIAC LOOP RECORDER IMPLANT    Procedure date: 3/2/24    Location: [facility]  Address: [address] [place] [id number]    You may have breakfast the morning of the procedure.    Please take your morning medication as prescribed.    The hospital will contact you one day prior to your procedure date between 4PM - 6PM to give you the time and place to report. If you do not hear from [facility] by 6PM the evening prior to your procedure, please call  [phone] ([place]) or [phone] ([place]).     Please notify us if you have been admitted to the hospital within the past 30 days.    You may drive yourself to and from the hospital for the procedure.    Make a list of your medications, including doses, to bring to the hospital. Please johnny which medications you took the morning of the procedure.    Leave all valuables at home.    If you have any further questions, I can be reached at [phone].    Medication holds:   N/A    Blood work to be done on:  N/A    Thank you,   Ashley \"Anya\" Willie  Surgery Coordinator  [facility]   Teams: [phone]  "

## 2024-01-11 NOTE — TELEPHONE ENCOUNTER
----- Message from Doretha Philip MA sent at 1/10/2024 10:00 AM EST -----  We will contact patient.  Thank you.    ----- Message -----  From: Paco Boyer MD  Sent: 1/10/2024   9:37 AM EST  To: Doretha Philip MA; #    Patient recently hospitalized at [facility] from [place] with strokelike symptoms.  Subsequently patient sent to me to arrange for loop recorder.  Would you please set up patient to have a loop recorder at [facility].  She lives with [facility].  If you many questions contact the [facility] office or myself.

## 2024-01-12 ENCOUNTER — TELEPHONE (OUTPATIENT)
Dept: NEUROLOGY | Facility: CLINIC | Age: 79
End: 2024-01-12

## 2024-01-12 NOTE — TELEPHONE ENCOUNTER
1ST ATTEMPT,     Called pt no answer, LINES SOUNDS LIKE A FAX NUMBER , I THEN CALLED LACIE WHO IS A [employer] AND STATED PT IS UNABLE TO LEAVE THE HOME, THEY WILL CALL US BACK WHEN ABLE TO SCHEDULE.    Patient declined to schedule. I did advise her that she can schedule an HFU up to 1 yr from her d/c date, anything after that would be a new patient appt. She verbalized understanding.    Not scheduling HFU at this time       Thank you,     Allison      Thank you,     Allison         HFU/ PATY ODALIS/ Stroke-like symptoms     DC- HOME - 12/1/2023.    Mattie Varela will need follow up in 6-8 weeks with neurovascular or general attending .  She will not require outpatient neurological testing.

## 2024-01-13 ENCOUNTER — HOSPITAL ENCOUNTER (INPATIENT)
Facility: HOSPITAL | Age: 79
LOS: 3 days | Discharge: NURSING, REHAB OR LONG-TERM CARE | DRG: 682 | End: 2024-01-17
Attending: EMERGENCY MEDICINE | Admitting: INTERNAL MEDICINE
Payer: MEDICARE

## 2024-01-13 ENCOUNTER — APPOINTMENT (EMERGENCY)
Dept: RADIOLOGY | Facility: HOSPITAL | Age: 79
DRG: 682 | End: 2024-01-13
Payer: MEDICARE

## 2024-01-13 ENCOUNTER — APPOINTMENT (EMERGENCY)
Dept: CT IMAGING | Facility: HOSPITAL | Age: 79
DRG: 682 | End: 2024-01-13
Payer: MEDICARE

## 2024-01-13 DIAGNOSIS — I47.19 MULTIFOCAL ATRIAL TACHYCARDIA: ICD-10-CM

## 2024-01-13 DIAGNOSIS — G93.40 ACUTE ENCEPHALOPATHY: Primary | ICD-10-CM

## 2024-01-13 DIAGNOSIS — R00.0 TACHYCARDIA: ICD-10-CM

## 2024-01-13 DIAGNOSIS — E03.9 HYPOTHYROIDISM, UNSPECIFIED TYPE: ICD-10-CM

## 2024-01-13 DIAGNOSIS — D64.9 ANEMIA: ICD-10-CM

## 2024-01-13 DIAGNOSIS — M48.061 DEGENERATIVE LUMBAR SPINAL STENOSIS: ICD-10-CM

## 2024-01-13 DIAGNOSIS — I16.0 HYPERTENSIVE URGENCY: ICD-10-CM

## 2024-01-13 DIAGNOSIS — R10.9 ACUTE ABDOMINAL PAIN: ICD-10-CM

## 2024-01-13 DIAGNOSIS — R03.0 ELEVATED BLOOD PRESSURE READING: ICD-10-CM

## 2024-01-13 DIAGNOSIS — N39.0 UTI (URINARY TRACT INFECTION): ICD-10-CM

## 2024-01-13 DIAGNOSIS — R79.89 ELEVATED SERUM CREATININE: ICD-10-CM

## 2024-01-13 DIAGNOSIS — E87.1 HYPONATREMIA: ICD-10-CM

## 2024-01-13 DIAGNOSIS — I10 PRIMARY HYPERTENSION: ICD-10-CM

## 2024-01-13 DIAGNOSIS — K52.9 ENTERITIS: ICD-10-CM

## 2024-01-13 PROBLEM — R41.82 AMS (ALTERED MENTAL STATUS): Status: ACTIVE | Noted: 2024-01-13

## 2024-01-13 PROBLEM — Z02.2 ENCOUNTER FOR EXAMINATION FOR ADMISSION TO NURSING HOME: Status: ACTIVE | Noted: 2024-01-13

## 2024-01-13 LAB
2HR DELTA HS TROPONIN: 5 NG/L
4HR DELTA HS TROPONIN: 9 NG/L
ALBUMIN SERPL BCP-MCNC: 4 G/DL (ref 3.5–5)
ALP SERPL-CCNC: 83 U/L (ref 34–104)
ALT SERPL W P-5'-P-CCNC: 8 U/L (ref 7–52)
ANION GAP SERPL CALCULATED.3IONS-SCNC: 11 MMOL/L
APTT PPP: 29 SECONDS (ref 23–37)
AST SERPL W P-5'-P-CCNC: 15 U/L (ref 13–39)
ATRIAL RATE: 112 BPM
BACTERIA UR CULT: ABNORMAL
BACTERIA UR CULT: ABNORMAL
BASOPHILS # BLD AUTO: 0.02 THOUSANDS/ÂΜL (ref 0–0.1)
BASOPHILS NFR BLD AUTO: 0 % (ref 0–1)
BILIRUB SERPL-MCNC: 0.47 MG/DL (ref 0.2–1)
BILIRUB UR QL STRIP: NEGATIVE
BUN SERPL-MCNC: 31 MG/DL (ref 5–25)
CALCIUM SERPL-MCNC: 8 MG/DL (ref 8.4–10.2)
CARDIAC TROPONIN I PNL SERPL HS: 15 NG/L
CARDIAC TROPONIN I PNL SERPL HS: 20 NG/L
CARDIAC TROPONIN I PNL SERPL HS: 24 NG/L
CHLORIDE SERPL-SCNC: 96 MMOL/L (ref 96–108)
CLARITY UR: CLEAR
CO2 SERPL-SCNC: 25 MMOL/L (ref 21–32)
COLOR UR: NORMAL
CREAT SERPL-MCNC: 1.69 MG/DL (ref 0.6–1.3)
EOSINOPHIL # BLD AUTO: 0.05 THOUSAND/ÂΜL (ref 0–0.61)
EOSINOPHIL NFR BLD AUTO: 1 % (ref 0–6)
ERYTHROCYTE [DISTWIDTH] IN BLOOD BY AUTOMATED COUNT: 14.6 % (ref 11.6–15.1)
FLUAV RNA RESP QL NAA+PROBE: NEGATIVE
FLUBV RNA RESP QL NAA+PROBE: NEGATIVE
GFR SERPL CREATININE-BSD FRML MDRD: 28 ML/MIN/1.73SQ M
GLUCOSE SERPL-MCNC: 182 MG/DL (ref 65–140)
GLUCOSE SERPL-MCNC: 203 MG/DL (ref 65–140)
GLUCOSE SERPL-MCNC: 209 MG/DL (ref 65–140)
GLUCOSE UR STRIP-MCNC: NEGATIVE MG/DL
HCT VFR BLD AUTO: 34.3 % (ref 34.8–46.1)
HGB BLD-MCNC: 11.1 G/DL (ref 11.5–15.4)
HGB UR QL STRIP.AUTO: NEGATIVE
IMM GRANULOCYTES # BLD AUTO: 0.06 THOUSAND/UL (ref 0–0.2)
IMM GRANULOCYTES NFR BLD AUTO: 1 % (ref 0–2)
INR PPP: 0.88 (ref 0.84–1.19)
KETONES UR STRIP-MCNC: NEGATIVE MG/DL
LACTATE SERPL-SCNC: 1.1 MMOL/L (ref 0.5–2)
LEUKOCYTE ESTERASE UR QL STRIP: NEGATIVE
LYMPHOCYTES # BLD AUTO: 0.89 THOUSANDS/ÂΜL (ref 0.6–4.47)
LYMPHOCYTES NFR BLD AUTO: 9 % (ref 14–44)
MCH RBC QN AUTO: 30.7 PG (ref 26.8–34.3)
MCHC RBC AUTO-ENTMCNC: 32.4 G/DL (ref 31.4–37.4)
MCV RBC AUTO: 95 FL (ref 82–98)
MONOCYTES # BLD AUTO: 1.13 THOUSAND/ÂΜL (ref 0.17–1.22)
MONOCYTES NFR BLD AUTO: 11 % (ref 4–12)
NEUTROPHILS # BLD AUTO: 7.94 THOUSANDS/ÂΜL (ref 1.85–7.62)
NEUTS SEG NFR BLD AUTO: 78 % (ref 43–75)
NITRITE UR QL STRIP: NEGATIVE
NRBC BLD AUTO-RTO: 0 /100 WBCS
OSMOLALITY UR/SERPL-RTO: 287 MMOL/KG (ref 282–298)
OSMOLALITY UR/SERPL-RTO: 299 MMOL/KG (ref 282–298)
PH UR STRIP.AUTO: 5 [PH]
PLATELET # BLD AUTO: 241 THOUSANDS/UL (ref 149–390)
PLATELET # BLD AUTO: 248 THOUSANDS/UL (ref 149–390)
PMV BLD AUTO: 10.2 FL (ref 8.9–12.7)
PMV BLD AUTO: 10.7 FL (ref 8.9–12.7)
POTASSIUM SERPL-SCNC: 4.3 MMOL/L (ref 3.5–5.3)
PROCALCITONIN SERPL-MCNC: 0.08 NG/ML
PROT SERPL-MCNC: 7.1 G/DL (ref 6.4–8.4)
PROT UR STRIP-MCNC: NEGATIVE MG/DL
PROTHROMBIN TIME: 12.5 SECONDS (ref 11.6–14.5)
QRS AXIS: 6 DEGREES
QRSD INTERVAL: 122 MS
QT INTERVAL: 358 MS
QTC INTERVAL: 488 MS
RBC # BLD AUTO: 3.62 MILLION/UL (ref 3.81–5.12)
RSV RNA RESP QL NAA+PROBE: NEGATIVE
SARS-COV-2 RNA RESP QL NAA+PROBE: NEGATIVE
SODIUM SERPL-SCNC: 132 MMOL/L (ref 135–147)
SP GR UR STRIP.AUTO: 1.01 (ref 1–1.03)
T WAVE AXIS: 63 DEGREES
TSH SERPL DL<=0.05 MIU/L-ACNC: 1.67 UIU/ML (ref 0.45–4.5)
UROBILINOGEN UR STRIP-ACNC: <2 MG/DL
VENTRICULAR RATE: 112 BPM
WBC # BLD AUTO: 10.09 THOUSAND/UL (ref 4.31–10.16)

## 2024-01-13 PROCEDURE — 0241U HB NFCT DS VIR RESP RNA 4 TRGT: CPT | Performed by: EMERGENCY MEDICINE

## 2024-01-13 PROCEDURE — 85610 PROTHROMBIN TIME: CPT | Performed by: EMERGENCY MEDICINE

## 2024-01-13 PROCEDURE — 84484 ASSAY OF TROPONIN QUANT: CPT | Performed by: EMERGENCY MEDICINE

## 2024-01-13 PROCEDURE — 85049 AUTOMATED PLATELET COUNT: CPT

## 2024-01-13 PROCEDURE — 99223 1ST HOSP IP/OBS HIGH 75: CPT | Performed by: HOSPITALIST

## 2024-01-13 PROCEDURE — G1004 CDSM NDSC: HCPCS

## 2024-01-13 PROCEDURE — 74176 CT ABD & PELVIS W/O CONTRAST: CPT

## 2024-01-13 PROCEDURE — 84300 ASSAY OF URINE SODIUM: CPT

## 2024-01-13 PROCEDURE — 99285 EMERGENCY DEPT VISIT HI MDM: CPT | Performed by: EMERGENCY MEDICINE

## 2024-01-13 PROCEDURE — 87040 BLOOD CULTURE FOR BACTERIA: CPT | Performed by: EMERGENCY MEDICINE

## 2024-01-13 PROCEDURE — 99285 EMERGENCY DEPT VISIT HI MDM: CPT

## 2024-01-13 PROCEDURE — 71045 X-RAY EXAM CHEST 1 VIEW: CPT

## 2024-01-13 PROCEDURE — 96361 HYDRATE IV INFUSION ADD-ON: CPT

## 2024-01-13 PROCEDURE — 96374 THER/PROPH/DIAG INJ IV PUSH: CPT

## 2024-01-13 PROCEDURE — 82948 REAGENT STRIP/BLOOD GLUCOSE: CPT

## 2024-01-13 PROCEDURE — 36415 COLL VENOUS BLD VENIPUNCTURE: CPT | Performed by: EMERGENCY MEDICINE

## 2024-01-13 PROCEDURE — 93005 ELECTROCARDIOGRAM TRACING: CPT

## 2024-01-13 PROCEDURE — 81003 URINALYSIS AUTO W/O SCOPE: CPT | Performed by: EMERGENCY MEDICINE

## 2024-01-13 PROCEDURE — 83935 ASSAY OF URINE OSMOLALITY: CPT

## 2024-01-13 PROCEDURE — 80053 COMPREHEN METABOLIC PANEL: CPT | Performed by: EMERGENCY MEDICINE

## 2024-01-13 PROCEDURE — 83930 ASSAY OF BLOOD OSMOLALITY: CPT

## 2024-01-13 PROCEDURE — 84145 PROCALCITONIN (PCT): CPT | Performed by: EMERGENCY MEDICINE

## 2024-01-13 PROCEDURE — 85730 THROMBOPLASTIN TIME PARTIAL: CPT | Performed by: EMERGENCY MEDICINE

## 2024-01-13 PROCEDURE — 85025 COMPLETE CBC W/AUTO DIFF WBC: CPT | Performed by: EMERGENCY MEDICINE

## 2024-01-13 PROCEDURE — 84443 ASSAY THYROID STIM HORMONE: CPT

## 2024-01-13 PROCEDURE — 96375 TX/PRO/DX INJ NEW DRUG ADDON: CPT

## 2024-01-13 PROCEDURE — 83605 ASSAY OF LACTIC ACID: CPT | Performed by: EMERGENCY MEDICINE

## 2024-01-13 RX ORDER — HEPARIN SODIUM 5000 [USP'U]/ML
5000 INJECTION, SOLUTION INTRAVENOUS; SUBCUTANEOUS EVERY 8 HOURS SCHEDULED
Status: DISCONTINUED | OUTPATIENT
Start: 2024-01-13 | End: 2024-01-17 | Stop reason: HOSPADM

## 2024-01-13 RX ORDER — ONDANSETRON 2 MG/ML
4 INJECTION INTRAMUSCULAR; INTRAVENOUS ONCE
Status: COMPLETED | OUTPATIENT
Start: 2024-01-13 | End: 2024-01-13

## 2024-01-13 RX ORDER — METOCLOPRAMIDE HYDROCHLORIDE 5 MG/ML
10 INJECTION INTRAMUSCULAR; INTRAVENOUS ONCE
Status: COMPLETED | OUTPATIENT
Start: 2024-01-13 | End: 2024-01-13

## 2024-01-13 RX ORDER — ATORVASTATIN CALCIUM 40 MG/1
40 TABLET, FILM COATED ORAL DAILY
Status: DISCONTINUED | OUTPATIENT
Start: 2024-01-14 | End: 2024-01-17 | Stop reason: HOSPADM

## 2024-01-13 RX ORDER — AMOXICILLIN 250 MG
1 CAPSULE ORAL
Status: DISCONTINUED | OUTPATIENT
Start: 2024-01-13 | End: 2024-01-17 | Stop reason: HOSPADM

## 2024-01-13 RX ORDER — VANCOMYCIN HYDROCHLORIDE 1 G/200ML
12.5 INJECTION, SOLUTION INTRAVENOUS DAILY PRN
Status: DISCONTINUED | OUTPATIENT
Start: 2024-01-14 | End: 2024-01-14 | Stop reason: DRUGHIGH

## 2024-01-13 RX ORDER — RANOLAZINE 500 MG/1
500 TABLET, EXTENDED RELEASE ORAL 2 TIMES DAILY
COMMUNITY

## 2024-01-13 RX ORDER — ACETAMINOPHEN 325 MG/1
975 TABLET ORAL EVERY 8 HOURS SCHEDULED
Status: DISCONTINUED | OUTPATIENT
Start: 2024-01-13 | End: 2024-01-15

## 2024-01-13 RX ORDER — PANTOPRAZOLE SODIUM 40 MG/1
40 TABLET, DELAYED RELEASE ORAL DAILY
Status: DISCONTINUED | OUTPATIENT
Start: 2024-01-14 | End: 2024-01-17 | Stop reason: HOSPADM

## 2024-01-13 RX ORDER — SODIUM CHLORIDE, SODIUM GLUCONATE, SODIUM ACETATE, POTASSIUM CHLORIDE, MAGNESIUM CHLORIDE, SODIUM PHOSPHATE, DIBASIC, AND POTASSIUM PHOSPHATE .53; .5; .37; .037; .03; .012; .00082 G/100ML; G/100ML; G/100ML; G/100ML; G/100ML; G/100ML; G/100ML
75 INJECTION, SOLUTION INTRAVENOUS CONTINUOUS
Status: DISPENSED | OUTPATIENT
Start: 2024-01-13 | End: 2024-01-14

## 2024-01-13 RX ORDER — ONDANSETRON 2 MG/ML
4 INJECTION INTRAMUSCULAR; INTRAVENOUS EVERY 6 HOURS PRN
Status: DISCONTINUED | OUTPATIENT
Start: 2024-01-13 | End: 2024-01-17 | Stop reason: HOSPADM

## 2024-01-13 RX ORDER — ALBUTEROL SULFATE 90 UG/1
2 AEROSOL, METERED RESPIRATORY (INHALATION) EVERY 6 HOURS PRN
Status: DISCONTINUED | OUTPATIENT
Start: 2024-01-13 | End: 2024-01-17 | Stop reason: HOSPADM

## 2024-01-13 RX ORDER — BACLOFEN 10 MG/1
10 TABLET ORAL 2 TIMES DAILY PRN
Status: DISCONTINUED | OUTPATIENT
Start: 2024-01-13 | End: 2024-01-17 | Stop reason: HOSPADM

## 2024-01-13 RX ORDER — ASPIRIN 81 MG/1
81 TABLET, CHEWABLE ORAL DAILY
COMMUNITY

## 2024-01-13 RX ORDER — LIDOCAINE 50 MG/G
1 PATCH TOPICAL ONCE
Status: DISCONTINUED | OUTPATIENT
Start: 2024-01-13 | End: 2024-01-13

## 2024-01-13 RX ORDER — FENTANYL CITRATE 50 UG/ML
50 INJECTION, SOLUTION INTRAMUSCULAR; INTRAVENOUS ONCE
Status: COMPLETED | OUTPATIENT
Start: 2024-01-13 | End: 2024-01-13

## 2024-01-13 RX ORDER — FERROUS SULFATE 325(65) MG
325 TABLET ORAL
Status: DISCONTINUED | OUTPATIENT
Start: 2024-01-14 | End: 2024-01-17 | Stop reason: HOSPADM

## 2024-01-13 RX ORDER — LEVOTHYROXINE SODIUM 0.07 MG/1
37.5 TABLET ORAL
Status: DISCONTINUED | OUTPATIENT
Start: 2024-01-14 | End: 2024-01-17 | Stop reason: HOSPADM

## 2024-01-13 RX ORDER — METOPROLOL SUCCINATE 50 MG/1
50 TABLET, EXTENDED RELEASE ORAL EVERY 12 HOURS SCHEDULED
Status: DISCONTINUED | OUTPATIENT
Start: 2024-01-13 | End: 2024-01-17 | Stop reason: HOSPADM

## 2024-01-13 RX ORDER — LIDOCAINE 50 MG/G
1 PATCH TOPICAL DAILY
Status: DISCONTINUED | OUTPATIENT
Start: 2024-01-14 | End: 2024-01-17 | Stop reason: HOSPADM

## 2024-01-13 RX ORDER — BACLOFEN 10 MG/1
10 TABLET ORAL 2 TIMES DAILY PRN
COMMUNITY

## 2024-01-13 RX ORDER — INSULIN LISPRO 100 [IU]/ML
1-6 INJECTION, SOLUTION INTRAVENOUS; SUBCUTANEOUS
Status: DISCONTINUED | OUTPATIENT
Start: 2024-01-13 | End: 2024-01-17 | Stop reason: HOSPADM

## 2024-01-13 RX ORDER — RANOLAZINE 500 MG/1
500 TABLET, EXTENDED RELEASE ORAL EVERY 12 HOURS SCHEDULED
Status: DISCONTINUED | OUTPATIENT
Start: 2024-01-13 | End: 2024-01-17 | Stop reason: HOSPADM

## 2024-01-13 RX ORDER — POLYETHYLENE GLYCOL 3350 17 G/17G
17 POWDER, FOR SOLUTION ORAL DAILY
Status: DISCONTINUED | OUTPATIENT
Start: 2024-01-14 | End: 2024-01-17 | Stop reason: HOSPADM

## 2024-01-13 RX ORDER — CLOPIDOGREL BISULFATE 75 MG/1
75 TABLET ORAL DAILY
Status: DISCONTINUED | OUTPATIENT
Start: 2024-01-14 | End: 2024-01-17 | Stop reason: HOSPADM

## 2024-01-13 RX ORDER — NYSTATIN 100000 [USP'U]/G
POWDER TOPICAL 2 TIMES DAILY
Status: DISCONTINUED | OUTPATIENT
Start: 2024-01-13 | End: 2024-01-17 | Stop reason: HOSPADM

## 2024-01-13 RX ORDER — ASPIRIN 81 MG/1
81 TABLET, CHEWABLE ORAL DAILY
Status: DISCONTINUED | OUTPATIENT
Start: 2024-01-14 | End: 2024-01-17 | Stop reason: HOSPADM

## 2024-01-13 RX ORDER — OXYCODONE AND ACETAMINOPHEN 10; 325 MG/1; MG/1
1 TABLET ORAL EVERY 6 HOURS PRN
COMMUNITY

## 2024-01-13 RX ADMIN — ACETAMINOPHEN 975 MG: 325 TABLET, FILM COATED ORAL at 18:01

## 2024-01-13 RX ADMIN — SODIUM CHLORIDE, SODIUM GLUCONATE, SODIUM ACETATE, POTASSIUM CHLORIDE, MAGNESIUM CHLORIDE, SODIUM PHOSPHATE, DIBASIC, AND POTASSIUM PHOSPHATE 75 ML/HR: .53; .5; .37; .037; .03; .012; .00082 INJECTION, SOLUTION INTRAVENOUS at 18:02

## 2024-01-13 RX ADMIN — METOPROLOL SUCCINATE 50 MG: 50 TABLET, EXTENDED RELEASE ORAL at 22:16

## 2024-01-13 RX ADMIN — SENNOSIDES, DOCUSATE SODIUM 1 TABLET: 8.6; 5 TABLET ORAL at 22:16

## 2024-01-13 RX ADMIN — VANCOMYCIN HYDROCHLORIDE 1500 MG: 1 INJECTION, POWDER, LYOPHILIZED, FOR SOLUTION INTRAVENOUS at 18:19

## 2024-01-13 RX ADMIN — METOCLOPRAMIDE 10 MG: 5 INJECTION, SOLUTION INTRAMUSCULAR; INTRAVENOUS at 16:02

## 2024-01-13 RX ADMIN — ONDANSETRON 4 MG: 2 INJECTION INTRAMUSCULAR; INTRAVENOUS at 14:24

## 2024-01-13 RX ADMIN — FENTANYL CITRATE 50 MCG: 50 INJECTION INTRAMUSCULAR; INTRAVENOUS at 14:08

## 2024-01-13 RX ADMIN — FENTANYL CITRATE 50 MCG: 50 INJECTION INTRAMUSCULAR; INTRAVENOUS at 16:02

## 2024-01-13 RX ADMIN — SODIUM CHLORIDE, SODIUM GLUCONATE, SODIUM ACETATE, POTASSIUM CHLORIDE, MAGNESIUM CHLORIDE, SODIUM PHOSPHATE, DIBASIC, AND POTASSIUM PHOSPHATE 75 ML/HR: .53; .5; .37; .037; .03; .012; .00082 INJECTION, SOLUTION INTRAVENOUS at 18:10

## 2024-01-13 RX ADMIN — ONDANSETRON 4 MG: 2 INJECTION INTRAMUSCULAR; INTRAVENOUS at 20:20

## 2024-01-13 RX ADMIN — SODIUM CHLORIDE 1000 ML: 0.9 INJECTION, SOLUTION INTRAVENOUS at 14:24

## 2024-01-13 RX ADMIN — NYSTATIN: 100000 POWDER TOPICAL at 22:17

## 2024-01-13 RX ADMIN — RANOLAZINE 500 MG: 500 TABLET, FILM COATED, EXTENDED RELEASE ORAL at 23:28

## 2024-01-13 RX ADMIN — HEPARIN SODIUM 5000 UNITS: 5000 INJECTION INTRAVENOUS; SUBCUTANEOUS at 22:16

## 2024-01-13 NOTE — ASSESSMENT & PLAN NOTE
Patient with altered mental status since evening of 1/12  In context of UTI  Patient's baseline allows for a normal conversation with attention and thought process per family  Denies recent injury, absent of focal neurologic defect  Complicated by likely medical noncompliance in recent days  Possible urinary retention  Significant back pain reported 10/10 - unclear contribution of chronic back pain vs acute flank pain    Patient is high risk of delirium due to acute encephalopathy    Plan:  Initiate delirium precautions  Maintain normal sleep/wake cycle  Minimize overnight interruptions, group overnight vitals/labs/nursing checks as possible  Dim lights, close blinds and turn off tv to minimize stimulation and encourage sleep environment in evenings  Ensure that pain is well controlled; Tylenol 975mg Q8H scheduled  Monitor for fecal and urinary retention which may precipitate delirium, initiated to urinary retention protocol  Encourage early mobilization and ambulation  Provide frequent reorientation and redirection  Encourage family and friends at the bedside to help help calm patient if anxious  Avoid medications which may precipitate or worsen delirium such as tramadol, benzodiazepine, anticholinergics, and benadryl  Encourage hydration and nutrition   Redirect unwanted behaviors as first line, avoid physical restraints, use chemical restraint only if all other attempts have been Unsuccessful, would consider Zyprexa 2.5mg IM Q, monitor for orthostatic hypotension and QTc prolongation with repeat dosing, recommend lowest dose possible for shortest duration possible     Treatment for UTI as noted under abnormal urinalysis  Follow-up TSH  Reinitiation of patient's home meds with the exception of opiates and muscarinic antagonist  Frequent reassessments for improvement as patient is treated for UTI and likely dehydration  No need for head imaging at this time given low suspicion for intracranial abnormality

## 2024-01-13 NOTE — ASSESSMENT & PLAN NOTE
Continue home levothyroxine 37.5 mcg  Follow-up TSH  Mind that the patient may have recently been noncompliant, TSH out of limits may not indicate need for dose change

## 2024-01-13 NOTE — Clinical Note
Case was discussed with  and the patient's admission status was agreed to be  to the service of Dr. Barrera

## 2024-01-13 NOTE — ASSESSMENT & PLAN NOTE
Recent Labs     01/10/24  1847 01/13/24  1354   HGB 11.2* 11.1*     Continue home ferrous sulfate 325 mg daily

## 2024-01-13 NOTE — ASSESSMENT & PLAN NOTE
Lab Results   Component Value Date    SODIUM 132 (L) 01/13/2024    SODIUM 135 01/10/2024    SODIUM 139 12/01/2023      Suspected etiology: Low solute intake    Received 1 L normal saline in ED    Plan:  Follow-up serum osmolality, urine osmolality, urine sodium  Isolyte 75 cc/h  Encourage p.o. intake  Monitor with daily metabolic panel

## 2024-01-13 NOTE — ED PROVIDER NOTES
History  Chief Complaint   Patient presents with    Altered Mental Status     Patient arrived via EMS from home c/o low back pain, possible UTI per EMS     Patient is an 78-year-old female, with a history significant for diabetes, diverticular disease, fibromyalgia, prior stroke, back pain status postlaminectomy, who presents to the ED today, via EMS, due to back pain/left flank pain.  There are associated UTI symptoms characterized as dysuria and lower abdominal pain.  Patient denies fever, chest pain, dyspnea, weakness, numbness, bowel or bladder incontinence, urinary retention, history of IVDU.  Notably, patient states that she has not taken any of her medications in 3 days.  She is unable to provide a reason for this.  She denies suicidal ideation.  Per my review of the medical record, patient had a UA performed on 1/10/2024 that is positive for UTI.  Movement exacerbates patient's symptoms.  Patient is without other concerns at this time.        Prior to Admission Medications   Prescriptions Last Dose Informant Patient Reported? Taking?   Blood Glucose Monitoring Suppl (OneTouch Verio Reflect) w/Device KIT   No No   Sig: Check blood sugars twice daily. Please substitute with appropriate alternative as covered by patient's insurance. Dx: E11.65   Milnacipran HCl (Savella) 100 MG TABS   No No   Sig: Take 1 tablet (100 mg total) by mouth daily   OneTouch Delica Lancets 33G MISC   No No   Sig: Check blood sugars twice daily. Please substitute with appropriate alternative as covered by patient's insurance. Dx: E11.65   albuterol (PROVENTIL HFA,VENTOLIN HFA) 90 mcg/act inhaler  Self No No   Sig: Inhale 2 puffs every 6 (six) hours as needed for wheezing or shortness of breath   atorvastatin (LIPITOR) 40 mg tablet   No No   Sig: TAKE ONE TABLET BY MOUTH ONCE DAILY   cholestyramine (QUESTRAN) 4 g packet   No No   Sig: Take 1 packet (4 g total) by mouth 3 (three) times a day with meals   clopidogrel (Plavix) 75 mg tablet    No No   Sig: Take 1 tablet (75 mg total) by mouth daily   escitalopram (Lexapro) 20 mg tablet  Self No No   Sig: Take 0.5 tablets (10 mg total) by mouth daily   ferrous sulfate 325 (65 Fe) mg tablet   No No   Sig: Take 1 tablet (325 mg total) by mouth daily with breakfast   glucose blood (OneTouch Verio) test strip   No No   Sig: Check blood sugars twice daily. Please substitute with appropriate alternative as covered by patient's insurance. Dx: E11.65   levothyroxine 25 mcg tablet  Self No No   Sig: Take 1.5 tablets (37.5 mcg total) by mouth daily   meclizine (ANTIVERT) 25 mg tablet   No No   Sig: Take 1 tablet (25 mg total) by mouth 4 (four) times a day as needed for dizziness   metFORMIN (GLUCOPHAGE) 1000 MG tablet   No No   Sig: Take 1 tablet (1,000 mg total) by mouth 2 (two) times a day with meals   metoprolol succinate (TOPROL-XL) 50 mg 24 hr tablet   No No   Sig: Take 1 tablet (50 mg total) by mouth every 12 (twelve) hours   pantoprazole (PROTONIX) 40 mg tablet   No No   Sig: Take 1 tablet (40 mg total) by mouth daily   trospium (SANCTURA XR) 60 mg 24 hr capsule   No No   Sig: Take 1 capsule (60 mg total) by mouth daily before breakfast      Facility-Administered Medications: None       Past Medical History:   Diagnosis Date    Acid reflux     Anemia     hx of iron-deficient    Anxiety     Arthritis     Asthma     last needed inhaler last year 2020    Basal cell carcinoma     upper lip    Chronic narcotic dependence (HCC)     Chronic pain     Colon polyp     Cystocele     Diabetes mellitus (HCC)     stable    Disease of thyroid gland     hypothyroidism    Diverticulosis     Dizziness     at times    Dysfunctional uterine bleeding     last assessed - 07May2014    Dysphagia     Fibromyalgia     Gastric ulcer     Gastroparesis     History of colonic polyps     last assessed - 09Feb2016    History of gastroesophageal reflux (GERD)     Hypercholesterolemia     Hyperlipidemia     Hypertension     IBS (irritable  bowel syndrome)     Pneumobilia 06/18/2022    Post laminectomy syndrome     S/P insertion of spinal cord stimulator 07/18/2018    Seasonal allergies     Shortness of breath     exertional    Spinal stenosis     Status post lumbar spinal fusion 03/16/2018    Stroke (HCC)     pt states slight stroke March 2022       Past Surgical History:   Procedure Laterality Date    APPENDECTOMY      BACK SURGERY      BREAST CYST EXCISION Left     CARDIAC CATHETERIZATION  11/14/2023    Procedure: Cardiac catheterization;  Surgeon: Randolph Holt MD;  [facility];  Service: Cardiology    CARDIAC CATHETERIZATION N/A 11/14/2023    Procedure: Cardiac Coronary Angiogram;  Surgeon: Randolph Holt MD;  [facility];  Service: Cardiology    CHOLECYSTECTOMY      COLONOSCOPY      ESOPHAGOGASTRODUODENOSCOPY N/A 09/28/2016    Procedure: ESOPHAGOGASTRODUODENOSCOPY (EGD);  Surgeon: Mylene Moeller MD;  Location: [facility];  Service:     HERNIA REPAIR      HYSTERECTOMY      TTAH-BSO age 28    LAMINECTOMY      LUMBAR LAMINECTOMY      OOPHORECTOMY      age 28    CA ARTHRODESIS POSTERIOR/PSTLAT TQ 1NTRSPC THORACIC N/A 06/04/2018    Procedure: Reopening of lumbar incision for T12-L5 posterior instrumented fixation and fusion and T12-L4 posterior decompression;  Surgeon: Wood Rogel MD;  Location: [facility];  Service: Neurosurgery    CA COLONOSCOPY FLX DX W/COLLJ SPEC WHEN PFRMD N/A 03/02/2016    Procedure: EGD AND COLONOSCOPY;  Surgeon: Mylene Moeller MD;  [facility]: [facility];  Service: Gastroenterology    CA DILATION ESOPH UNGUIDED SOUND/BOUGIE 1/MULT PASS N/A 09/28/2016    Procedure: DILATATION ESOPHAGEAL;  Surgeon: Mylene Moeller MD;  [facility];  Service: Gastroenterology    CA ESOPHAGOGASTRODUODENOSCOPY TRANSORAL DIAGNOSTIC N/A 07/18/2016    Procedure: ESOPHAGOGASTRODUODENOSCOPY (EGD);  Surgeon: Mylene Moeller MD;  [facility];  Service: Gastroenterology    CA INSJ/RPLCMT SPINAL NPG/RCVR  POCKET CRTJ&CONNJ Left 2018    Procedure: removal of left buttock implantable pulse generator and placement of new  implantable pulse generator;  Surgeon: Wood Rogel MD;  [facility];  Service: Neurosurgery       Family History   Problem Relation Age of Onset    Lung cancer Mother 46    Pulmonary embolism Father     No Known Problems Sister     No Known Problems Daughter     No Known Problems Daughter     Stroke Maternal Grandmother     Heart attack Maternal Grandfather     No Known Problems Paternal Grandmother     No Known Problems Paternal Grandfather     No Known Problems Maternal Aunt     Diabetes Family         Diabetes mellitus    Hypertension Family     Stroke Family         Stroke complications     I have reviewed and agree with the history as documented.    E-Cigarette/Vaping     E-Cigarette/Vaping Substances    Nicotine No     THC No     CBD No     Flavoring No     Other No     Unknown No      Social History     Tobacco Use    Smoking status: Former     Current packs/day: 0.00     Types: Cigarettes     Quit date: 1970     Years since quittin.0    Smokeless tobacco: Never    Tobacco comments:     Denied history of current ever day smoker, Former smoker and Never smoker all documented in [employer]   Substance Use Topics    Alcohol use: Not Currently     Comment: Denied history of alcohol use    Drug use: No     Comment: Denied history of drug use       Review of Systems   Constitutional:  Positive for chills. Negative for fever.   HENT:  Negative for trouble swallowing.    Eyes:  Negative for visual disturbance.   Respiratory:  Negative for shortness of breath.    Cardiovascular:  Negative for chest pain.   Gastrointestinal:  Positive for abdominal pain.   Endocrine: Negative for polyuria.   Genitourinary:  Positive for dysuria.   Musculoskeletal:  Negative for gait problem.   Skin:  Negative for rash.   Allergic/Immunologic: Positive for environmental allergies.   Neurological:   Negative for weakness and numbness.   Hematological:  Negative for adenopathy.   Psychiatric/Behavioral:  Positive for confusion.    All other systems reviewed and are negative.      Physical Exam  Physical Exam  Vitals and nursing note reviewed. Exam conducted with a chaperone present.   Constitutional:       Appearance: She is obese. She is ill-appearing. She is not toxic-appearing.   HENT:      Head: Normocephalic and atraumatic.      Right Ear: External ear normal.      Left Ear: External ear normal.      Nose: Nose normal. No rhinorrhea.      Mouth/Throat:      Mouth: Mucous membranes are moist.      Pharynx: Oropharynx is clear. No oropharyngeal exudate or posterior oropharyngeal erythema.      Comments: Uvula midline. No oropharyngeal or submandibular mass/swelling  Eyes:      General: No scleral icterus.        Right eye: No discharge.         Left eye: No discharge.      Conjunctiva/sclera: Conjunctivae normal.      Pupils: Pupils are equal, round, and reactive to light.   Neck:      Comments: Patient is spontaneously rotating their neck to the left and right during the history and physical exam interaction without difficulty or apparent discomfort    Cardiovascular:      Rate and Rhythm: Tachycardia present. Rhythm irregular.      Pulses: Normal pulses.      Heart sounds: Normal heart sounds. No murmur heard.     No friction rub. No gallop.      Comments: 2+ Radial  Pulmonary:      Effort: Pulmonary effort is normal. No respiratory distress.      Breath sounds: Normal breath sounds. No stridor. No wheezing, rhonchi or rales.   Abdominal:      General: Abdomen is flat. There is no distension.      Palpations: Abdomen is soft.      Tenderness: There is abdominal tenderness (Diffuse). There is left CVA tenderness and guarding. There is no right CVA tenderness or rebound.   Genitourinary:     General: Normal vulva.      Rectum: Normal.   Musculoskeletal:         General: No deformity.      Cervical back: Neck  supple. No tenderness. No muscular tenderness.      Right lower leg: No edema.      Left lower leg: No edema.      Comments: No midline C, T, L-spine tenderness to palpation.   Lymphadenopathy:      Cervical: No cervical adenopathy.   Skin:     General: Skin is warm and dry.      Capillary Refill: Capillary refill takes less than 2 seconds.      Findings: Erythema present.      Comments: Fungal skin changes in the intertriginous area, no signs of Cecil's   Neurological:      Mental Status: She is alert.      Comments: AAOx4/4.  Patient moving all 4 extremities equally.    No saddle anesthesia.  Intact L5 and S1 motor and sensory function bilaterally.   Psychiatric:         Mood and Affect: Mood normal.         Behavior: Behavior normal.         Vital Signs  ED Triage Vitals   Temperature Pulse Respirations Blood Pressure SpO2   01/13/24 1414 01/13/24 1327 01/13/24 1327 01/13/24 1327 01/13/24 1327   98.2 °F (36.8 °C) 56 16 (!) 177/88 94 %      Temp Source Heart Rate Source Patient Position - Orthostatic VS BP Location FiO2 (%)   01/13/24 1414 01/13/24 1327 01/13/24 1327 01/13/24 1327 --   Oral Monitor Sitting Right arm       Pain Score       01/13/24 1327       10 - Worst Possible Pain           Vitals:    01/13/24 1340 01/13/24 1430 01/13/24 1553 01/13/24 1556   BP:    (S) (!) 215/103   Pulse: (!) 110 (!) 109 (!) 112    Patient Position - Orthostatic VS:  Lying           Visual Acuity      ED Medications  Medications   nystatin (MYCOSTATIN) powder (has no administration in time range)   fentanyl citrate (PF) 100 MCG/2ML 50 mcg (has no administration in time range)   lidocaine (LIDODERM) 5 % patch 1 patch (has no administration in time range)   naloxone (NARCAN) 0.04 mg/mL syringe 0.04 mg (has no administration in time range)   metoclopramide (REGLAN) injection 10 mg (has no administration in time range)   sodium chloride 0.9 % bolus 1,000 mL (0 mL Intravenous Stopped 1/13/24 1524)   fentanyl citrate (PF) 100  MCG/2ML 50 mcg (50 mcg Intravenous Given 1/13/24 1408)   ondansetron (ZOFRAN) injection 4 mg (4 mg Intravenous Given 1/13/24 1424)       Diagnostic Studies  Results Reviewed       Procedure Component Value Units Date/Time    HS Troponin I 2hr [[id number]] Collected: 01/13/24 1559    Lab Status: No result Specimen: Blood from Arm, Left     HS Troponin I 4hr [[id number]]     Lab Status: No result Specimen: Blood     UA w Reflex to Microscopic w Reflex to Culture [[id number]] Collected: 01/13/24 1406    Lab Status: Final result Specimen: Urine, Straight Cath Updated: 01/13/24 1452     Color, UA Dark Orange     Clarity, UA Clear     Specific Gravity, UA 1.015     pH, UA 5.0     Leukocytes, UA Negative     Nitrite, UA Negative     Protein, UA Negative mg/dl      Glucose, UA Negative mg/dl      Ketones, UA Negative mg/dl      Urobilinogen, UA <2.0 mg/dl      Bilirubin, UA Negative     Occult Blood, UA Negative    Blood culture #2 [[id number]] Collected: 01/13/24 1438    Lab Status: In process Specimen: Blood from Arm, Right Updated: 01/13/24 1451    COVID/FLU/RSV [[id number]]  (Normal) Collected: 01/13/24 1354    Lab Status: Final result Specimen: Nares from Nose Updated: 01/13/24 1439     SARS-CoV-2 Negative     INFLUENZA A PCR Negative     INFLUENZA B PCR Negative     RSV PCR Negative    Narrative:      FOR PEDIATRIC PATIENTS - copy/paste COVID Guidelines URL to browser: [email or web address]    SARS-CoV-2 assay is a Nucleic Acid Amplification assay intended for the  qualitative detection of nucleic acid from SARS-CoV-2 in nasopharyngeal  swabs. Results are for the presumptive identification of SARS-CoV-2 RNA.    Positive results are indicative of infection with SARS-CoV-2, the virus  causing COVID-19, but do not rule out bacterial infection or co-infection  with other viruses. Laboratories within the [place] and its  territories are required to report all positive  results to the appropriate  public health authorities. Negative results do not preclude SARS-CoV-2  infection and should not be used as the sole basis for treatment or other  patient management decisions. Negative results must be combined with  clinical observations, patient history, and epidemiological information.  This test has not been FDA cleared or approved.    This test has been authorized by FDA under an Emergency Use Authorization  (EUA). This test is only authorized for the duration of time the  declaration that circumstances exist justifying the authorization of the  emergency use of an in vitro diagnostic tests for detection of SARS-CoV-2  virus and/or diagnosis of COVID-19 infection under section 564(b)(1) of  the Act, 21 U.S.C. 360bbb-3(b)(1), unless the authorization is terminated  or revoked sooner. The test has been validated but independent review by FDA  and CLIA is pending.    Test performed using Cepheid GeneXpert: This RT-PCR assay targets N2,  a region unique to SARS-CoV-2. A conserved region in the E-gene was chosen  for pan-Sarbecovirus detection which includes SARS-CoV-2.    According to CMS-2020-01-R, this platform meets the definition of high-throughput technology.    Procalcitonin [[id number]]  (Normal) Collected: 01/13/24 1354    Lab Status: Final result Specimen: Blood from Arm, Left Updated: 01/13/24 1432     Procalcitonin 0.08 ng/ml     HS Troponin 0hr (reflex protocol) [[id number]]  (Normal) Collected: 01/13/24 1354    Lab Status: Final result Specimen: Blood from Arm, Left Updated: 01/13/24 1425     hs TnI 0hr 15 ng/L     Comprehensive metabolic panel [[id number]]  (Abnormal) Collected: 01/13/24 1354    Lab Status: Final result Specimen: Blood from Arm, Left Updated: 01/13/24 1422     Sodium 132 mmol/L      Potassium 4.3 mmol/L      Chloride 96 mmol/L      CO2 25 mmol/L      ANION GAP 11 mmol/L      BUN 31 mg/dL      Creatinine 1.69 mg/dL      Glucose 182 mg/dL      Calcium 8.0 mg/dL       AST 15 U/L      ALT 8 U/L      Alkaline Phosphatase 83 U/L      Total Protein 7.1 g/dL      Albumin 4.0 g/dL      Total Bilirubin 0.47 mg/dL      eGFR 28 ml/min/1.73sq m     Narrative:      National Kidney Disease Foundation guidelines for Chronic Kidney Disease (CKD):     Stage 1 with normal or high GFR (GFR > 90 mL/min/1.73 square meters)    Stage 2 Mild CKD (GFR = 60-89 mL/min/1.73 square meters)    Stage 3A Moderate CKD (GFR = 45-59 mL/min/1.73 square meters)    Stage 3B Moderate CKD (GFR = 30-44 mL/min/1.73 square meters)    Stage 4 Severe CKD (GFR = 15-29 mL/min/1.73 square meters)    Stage 5 End Stage CKD (GFR <15 mL/min/1.73 square meters)  Note: GFR calculation is accurate only with a steady state creatinine    Lactic acid [[id number]]  (Normal) Collected: 01/13/24 1354    Lab Status: Final result Specimen: Blood from Arm, Left Updated: 01/13/24 1422     LACTIC ACID 1.1 mmol/L     Narrative:      Result may be elevated if tourniquet was used during collection.    Protime-INR [[id number]]  (Normal) Collected: 01/13/24 1354    Lab Status: Final result Specimen: Blood from Arm, Left Updated: 01/13/24 1417     Protime 12.5 seconds      INR 0.88    APTT [[id number]]  (Normal) Collected: 01/13/24 1354    Lab Status: Final result Specimen: Blood from Arm, Left Updated: 01/13/24 1417     PTT 29 seconds     CBC and differential [[id number]]  (Abnormal) Collected: 01/13/24 1354    Lab Status: Final result Specimen: Blood from Arm, Left Updated: 01/13/24 1408     WBC 10.09 Thousand/uL      RBC 3.62 Million/uL      Hemoglobin 11.1 g/dL      Hematocrit 34.3 %      MCV 95 fL      MCH 30.7 pg      MCHC 32.4 g/dL      RDW 14.6 %      MPV 10.2 fL      Platelets 241 Thousands/uL      nRBC 0 /100 WBCs      Neutrophils Relative 78 %      Immat GRANS % 1 %      Lymphocytes Relative 9 %      Monocytes Relative 11 %      Eosinophils Relative 1 %      Basophils Relative 0 %      Neutrophils Absolute 7.94 Thousands/µL      Immature  Grans Absolute 0.06 Thousand/uL      Lymphocytes Absolute 0.89 Thousands/µL      Monocytes Absolute 1.13 Thousand/µL      Eosinophils Absolute 0.05 Thousand/µL      Basophils Absolute 0.02 Thousands/µL     Blood culture #1 [[id number]] Collected: 01/13/24 1354    Lab Status: In process Specimen: Blood from Arm, Left Updated: 01/13/24 1358    Fingerstick Glucose (POCT) [[id number]]  (Abnormal) Collected: 01/13/24 1340    Lab Status: Final result Updated: 01/13/24 1342     POC Glucose 203 mg/dl                    CT abdomen pelvis wo contrast   Final Result by Oz Ham MD (01/13 1528)      1.  Thickened loops of jejunum in the right upper quadrant with possible mild adjacent mesenteric edema though this may be artifactual. A low-grade enteritis is not excluded.      2.  Mild fecal stasis predominantly in the left colon.            Workstation performed: [id number]         XR chest 1 view portable   ED Interpretation by Naveen Pittman MD (01/13 1419)   Per my independent interpretation: No acute cardiopulmonary process                 Procedures  Procedures         ED Course  ED Course as of 01/13/24 1601   Sat Jan 13, 2024   1348 ECG per my independent interpretation: Tachcyardic rate, irregular rhythm, no ectopy, normal axis, no ST elevations.  Multifocal atrial tachycardia appearance   1352 Per my review of the medical record, patient has had a 5-day course of ceftriaxone in 2022   1413 Hemoglobin(!): 11.1  Low, baseline    1424 LACTIC ACID: 1.1   1424 Creatinine(!): 1.69  High   1426 hs TnI 0hr: 15  High, slight elevation. Will require delta    1434 Patient's daughter and POA, [place], now present in room.  She states that patient was confused this morning and having rigors.  She confirms that patient has not been taking her medication she lives at home.  She wishes for patient to be admitted for placement.  Results to this point reviewed with her.  Questions answered to her satisfaction.   5245  Leukocytes, UA: Negative  WNL   1507 Nitrite, UA: Negative  WNL                               SBIRT 22yo+      Flowsheet Row Most Recent Value   Initial Alcohol Screen: US AUDIT-C     1. How often do you have a drink containing alcohol? 0 Filed at: 01/13/2024 1329   2. How many drinks containing alcohol do you have on a typical day you are drinking?  0 Filed at: 01/13/2024 1329   3a. Male UNDER 65: How often do you have five or more drinks on one occasion? 0 Filed at: 01/13/2024 1329   3b. FEMALE Any Age, or MALE 65+: How often do you have 4 or more drinks on one occassion? 0 Filed at: 01/13/2024 1329   Audit-C Score 0 Filed at: 01/13/2024 1329   DUNCAN: How many times in the past year have you...    Used an illegal drug or used a prescription medication for non-medical reasons? Never Filed at: 01/13/2024 1329                      Medical Decision Making  Patient is an 78-year-old female, with a history significant for diabetes, diverticular disease, fibromyalgia, prior stroke, back pain status postlaminectomy, who presents to the ED today, via EMS, due to back pain/left flank pain.  There are associated UTI symptoms characterized as dysuria and lower abdominal pain.  Patient denies fever, chest pain, dyspnea, weakness, numbness, bowel or bladder incontinence, urinary retention, history of IVDU.  Notably, patient states that she has not taken any of her medications in 3 days.  She is unable to provide a reason for this.  She denies suicidal ideation.  Per my review of the medical record, patient had a UA performed on 1/10/2024 that is positive for UTI.  Movement exacerbates patient's symptoms.  Point-of-care glucose on arrival slightly elevated but overall unremarkable.  Patient is currently afebrile, tachycardic, otherwise stable.  Her physical exam is notable for tachycardia, ill appearance, diffuse abdominal tenderness and left CVA tenderness, clear heart and lungs.  This presentation is concerning for: ACS,  lateral abnormality, anemia, bowel obstruction, arrhythmia.  Patient at risk for diverticulitis/UTI/pyelo.  No reason to suspect cauda equina/conus medullaris/epidural abscess at this time based on history physical exam.  Will investigate with sepsis panel order set, cardiac workup, CT abdomen pelvis.  Will manage with fluids, fentanyl for analgesia, further based on workup.    Amount and/or Complexity of Data Reviewed  Labs: ordered. Decision-making details documented in ED Course.  Radiology: ordered and independent interpretation performed.    Risk  Prescription drug management.  Decision regarding hospitalization.             Disposition  Final diagnoses:   Acute encephalopathy   Elevated blood pressure reading   Tachycardia   Multifocal atrial tachycardia   Hyponatremia   Elevated serum creatinine   Acute abdominal pain   Anemia   Enteritis     Time reflects when diagnosis was documented in both MDM as applicable and the Disposition within this note       Time User Action Codes Description Comment    1/13/2024  2:27 PM Legare, Christopher A Add [G93.40] Acute encephalopathy     1/13/2024  2:27 PM Legare, Christopher A Add [R03.0] Elevated blood pressure reading     1/13/2024  2:27 PM Legare, Christopher A Add [R00.0] Tachycardia     1/13/2024  2:27 PM Legare, Christopher A Add [I47.19] Multifocal atrial tachycardia     1/13/2024  2:27 PM Legare, Christopher A Add [E87.1] Hyponatremia     1/13/2024  2:27 PM Legare, Christopher A Add [R79.89] Elevated serum creatinine     1/13/2024  2:27 PM Legare, Christopher A Add [R10.9] Acute abdominal pain     1/13/2024  2:28 PM Legare, Christopher A Add [D64.9] Anemia     1/13/2024  3:35 PM Legare, Christopher A Add [K52.9] Enteritis           ED Disposition       ED Disposition   Admit    Condition   Stable    Date/Time   Sat Jan 13, 2024 1600    Comment   Case was discussed with GREG and the patient's admission status was agreed observation to be  to the service of   [facility] .               Follow-up Information    None         Patient's Medications   Discharge Prescriptions    No medications on file       No discharge procedures on file.    PDMP Review         Value Time User    PDMP Reviewed  Yes 11/26/2023  3:43 PM Julian Low MD            ED Provider  Electronically Signed by             Naveen Pittman MD  01/13/24 8319

## 2024-01-13 NOTE — ASSESSMENT & PLAN NOTE
Currently lives with disabled son  Family reports medical noncompliance  Patient minimally ambulatory only able to transfer to bathroom  Significant medical need, unable to provide for self  Family report that they feel unable to provide patient with necessary care at home any longer, request placement after this admission    Plan:  Consult to case management

## 2024-01-13 NOTE — LETTER
FAX      [facility]   [place]:  Phone:       Fax:        Email:        From:   Rosario Guzman  Phone:          Fax:    Email:      ________________________________________________    AVS attached. Pick-up scheduled for 10:00am, but running a little late - should be here shortly. RN to call report. Thanks!      NOTICE:  This communication, including attachments, may contain information that is confidential and protected by the -client or other privilege(s).

## 2024-01-13 NOTE — ASSESSMENT & PLAN NOTE
Lab Results   Component Value Date    HGBA1C 6.5 (H) 11/26/2023       Recent Labs     01/13/24  1340   POCGLU 203*       Blood Sugar Average: Last 72 hrs:  (P) 203    Hold home metformin  Insulin sliding scale  Hypoglycemic protocol

## 2024-01-13 NOTE — ASSESSMENT & PLAN NOTE
Recent Labs     01/10/24  1847 01/13/24  1354   CREATININE 1.70* 1.69*   EGFR 28 28     Estimated Creatinine Clearance: 24.1 mL/min (A) (by C-G formula based on SCr of 1.69 mg/dL (H)).    Baseline creatinine appears to be 1-1.2  Per chart review patient seems to have frequent SADAF's  S/p 1 L normal saline in ED  Per family, patient has been refusing p.o. intake for several days and retaining urine  Lack of sis azotemia    Suspected etiology: Dehydration    Plan:  Strict I's and O's  Gentle hydration with Isolyte 75 cc/h  Urinary retention protocol

## 2024-01-13 NOTE — H&P
[facility]  H&P  Name: Mattie Varela 78 y.o. female I MRN: [id number]  Unit/Bed#: ED-25 I Date of Admission: 1/13/2024   Date of Service: 1/13/2024 I Hospital Day: 0      Assessment/Plan   AMS (altered mental status)  Assessment & Plan  Patient with altered mental status since evening of 1/12  In context of UTI  Patient's baseline allows for a normal conversation with attention and thought process per family  Denies recent injury, absent of focal neurologic defect  Complicated by likely medical noncompliance in recent days  Possible urinary retention  Significant back pain reported 10/10 - unclear contribution of chronic back pain vs acute flank pain    Patient is high risk of delirium due to acute encephalopathy    Plan:  Initiate delirium precautions  Maintain normal sleep/wake cycle  Minimize overnight interruptions, group overnight vitals/labs/nursing checks as possible  Dim lights, close blinds and turn off tv to minimize stimulation and encourage sleep environment in evenings  Ensure that pain is well controlled; Tylenol 975mg Q8H scheduled  Monitor for fecal and urinary retention which may precipitate delirium, initiated to urinary retention protocol  Encourage early mobilization and ambulation  Provide frequent reorientation and redirection  Encourage family and friends at the bedside to help help calm patient if anxious  Avoid medications which may precipitate or worsen delirium such as tramadol, benzodiazepine, anticholinergics, and benadryl  Encourage hydration and nutrition   Redirect unwanted behaviors as first line, avoid physical restraints, use chemical restraint only if all other attempts have been Unsuccessful, would consider Zyprexa 2.5mg IM Q, monitor for orthostatic hypotension and QTc prolongation with repeat dosing, recommend lowest dose possible for shortest duration possible     Treatment for UTI as noted under abnormal urinalysis  Follow-up TSH  Reinitiation of  patient's home meds with the exception of opiates and muscarinic antagonist  Frequent reassessments for improvement as patient is treated for UTI and likely dehydration  No need for head imaging at this time given low suspicion for intracranial abnormality      Abnormal urinalysis  Assessment & Plan  Patient with abnormal urinalysis growing Enterococcus faecalis on 1/10  History of recurrent UTI with drug-resistant organisms  Was not treated on 1/10 given MDRO history pending sensitivities  Reports dysuria and flank pain  Urinalysis 1/13 clean                                                    Ampicillin                 <=2.00 ug/ml   Susceptible    Levofloxacin   <=1.00 ug/ml Susceptible    Nitrofurantoin  <=32 ug/ml Susceptible    Tetracycline  >8 ug/ml Resistant    Vancomycin   1.00 ug/ml Susceptible        Patient is allergic to ampicillin and cephalosporins    Plan:  Will treat UTI given urinary symptoms, AMS, and recent positive urinalysis   Vancomycin initiated 1/13      Acute kidney injury (HCC)  Assessment & Plan  Recent Labs     01/10/24  1847 01/13/24  1354   CREATININE 1.70* 1.69*   EGFR 28 28     Estimated Creatinine Clearance: 24.1 mL/min (A) (by C-G formula based on SCr of 1.69 mg/dL (H)).    Baseline creatinine appears to be 1-1.2  Per chart review patient seems to have frequent SADAF's  S/p 1 L normal saline in ED  Per family, patient has been refusing p.o. intake for several days and retaining urine  Lack of sis azotemia    Suspected etiology: Dehydration    Plan:  Strict I's and O's  Gentle hydration with Isolyte 75 cc/h  Urinary retention protocol    Hypertensive urgency  Assessment & Plan  Blood Pressure: (!) 190/84  Likely in setting of medical noncompliance    Plan:  Restart home metoprolol succinate 50 Mg twice daily  Continue to monitor, consider adding additional antihypertensive medication pending response  Can consider as needed hydralazine if needed for uncontrolled hypertension in the  meantime    Hyponatremia  Assessment & Plan  Lab Results   Component Value Date    SODIUM 132 (L) 01/13/2024    SODIUM 135 01/10/2024    SODIUM 139 12/01/2023      Suspected etiology: Low solute intake    Received 1 L normal saline in ED    Plan:  Follow-up serum osmolality, urine osmolality, urine sodium  Isolyte 75 cc/h  Encourage p.o. intake  Monitor with daily metabolic panel    Hypothyroidism  Assessment & Plan  Continue home levothyroxine 37.5 mcg  Follow-up TSH  Mind that the patient may have recently been noncompliant, TSH out of limits may not indicate need for dose change    Type 2 diabetes mellitus with other skin complications (HCC)  Assessment & Plan  Lab Results   Component Value Date    HGBA1C 6.5 (H) 11/26/2023       Recent Labs     01/13/24  1340   POCGLU 203*       Blood Sugar Average: Last 72 hrs:  (P) 203    Hold home metformin  Insulin sliding scale  Hypoglycemic protocol    Cerebrovascular accident (CVA), unspecified mechanism (HCC)  Assessment & Plan  History of CVA in addition to CAD    Plan:  Continue home Plavix and statin    Iron deficiency anemia secondary to inadequate dietary iron intake  Assessment & Plan  Recent Labs     01/10/24  1847 01/13/24  1354   HGB 11.2* 11.1*     Continue home ferrous sulfate 325 mg daily    Encounter for examination for admission to nursing home  Assessment & Plan  Currently lives with disabled son  Family reports medical noncompliance  Patient minimally ambulatory only able to transfer to bathroom  Significant medical need, unable to provide for self  Family report that they feel unable to provide patient with necessary care at home any longer, request placement after this admission    Plan:  Consult to case management    VTE Pharmacologic Prophylaxis: VTE Score: 4 Moderate Risk (Score 3-4) - Pharmacological DVT Prophylaxis Ordered: heparin.  Code Status: Level 3 - DNAR and DNI  Discussion with family: Updated  (daughter) via  "phone.    Anticipated Length of Stay: Patient will be admitted on an observation basis with an anticipated length of stay of less than 2 midnights secondary to AMS with UTI.    Chief Complaint: \"My back, please help me\"    History of Present Illness:  Mattie Varela is a 78 y.o. female with a PMH of T2DM, prior stroke, chronic back pain s/p laminectomy, multiple recent admissions for dehydration, recurrent UTIs with MDRO who presents with altered mental status.    Patient is a poor historian, somnolent and perseverating on back pain.  Unable to provide consistent history other than presence of back pain.  History gathered from daughter who is an attending family medicine physician at [employer].    Patient has history of recurrent UTIs with MDRO in addition to 6 hospitalizations for dehydration resulting in AMS within the last year.  Patient has reportedly been noncompliant with home medications for an unknown.  It is suspected that patient may have taken too much of certain medications and taking other medications not at all, we are uncertain which medications may be affected.  Reportedly, recently began complaining of dysuria and increasing left flank pain.  Home nursing checked a urine sample on 1/10 which demonstrated bacteriuria.  Given patient's history of MDRO antibiotic treatment was withheld pending susceptibilities.  In the interim, patient began becoming increasingly altered first appreciated on the evening of 1/12.  The patient has reportedly been retaining urine and refusing p.o. food and fluids.  Patient has apparently reported constipation to family, however this is inconsistent with messes left in the bathroom over the last several days.    No known history of trauma and no focal neurologic defects are present.  On admission, patient was found to be afebrile without leukocytosis.  Urinalysis collected 1/13 was clean.  CMP demonstrated hyponatremia and SADAF.  Glucose was mildly elevated at 203.  CT " abdomen pelvis demonstrated thickened loops of jejunum in the right upper quadrant with possible mild adjacent mesenteric edema consistent with low-grade enteritis in addition to mild fecal stasis.  Per my independent interpretation, chest x-ray demonstrates no acute cardiopulmonary disease.  Patient requires no extra oxygen.    Patient reportedly lives with disabled son.  Family report that they feel they can no longer provide sufficient medical care to the patient and request placement at a facility upon discharge from this admission.      Review of Systems:  Review of Systems   Reason unable to perform ROS: Review of systems was limited by AMS.   Respiratory:  Negative for chest tightness and shortness of breath.    Cardiovascular:  Negative for chest pain.   Gastrointestinal:  Positive for nausea. Negative for abdominal pain.   Musculoskeletal:  Positive for back pain.       Past Medical and Surgical History:   Past Medical History:   Diagnosis Date    Acid reflux     Anemia     hx of iron-deficient    Anxiety     Arthritis     Asthma     last needed inhaler last year 2020    Basal cell carcinoma     upper lip    Chronic narcotic dependence (HCC)     Chronic pain     Colon polyp     Cystocele     Diabetes mellitus (HCC)     stable    Disease of thyroid gland     hypothyroidism    Diverticulosis     Dizziness     at times    Dysfunctional uterine bleeding     last assessed - [id number]    Dysphagia     Fibromyalgia     Gastric ulcer     Gastroparesis     History of colonic polyps     last assessed - [id number]    History of gastroesophageal reflux (GERD)     Hypercholesterolemia     Hyperlipidemia     Hypertension     IBS (irritable bowel syndrome)     Pneumobilia 06/18/2022    Post laminectomy syndrome     S/P insertion of spinal cord stimulator 07/18/2018    Seasonal allergies     Shortness of breath     exertional    Spinal stenosis     Status post lumbar spinal fusion 03/16/2018    Stroke (HCC)     pt states  slight stroke March 2022       Past Surgical History:   Procedure Laterality Date    APPENDECTOMY      BACK SURGERY      BREAST CYST EXCISION Left     CARDIAC CATHETERIZATION  11/14/2023    Procedure: Cardiac catheterization;  Surgeon: Randolph Holt MD;  [facility];  Service: Cardiology    CARDIAC CATHETERIZATION N/A 11/14/2023    Procedure: Cardiac Coronary Angiogram;  Surgeon: Randolph Holt MD;  [facility];  Service: Cardiology    CHOLECYSTECTOMY      COLONOSCOPY      ESOPHAGOGASTRODUODENOSCOPY N/A 09/28/2016    Procedure: ESOPHAGOGASTRODUODENOSCOPY (EGD);  Surgeon: Mylene Moeller MD;  Location: [facility];  Service:     HERNIA REPAIR      HYSTERECTOMY      TTAH-BSO age 28    LAMINECTOMY      LUMBAR LAMINECTOMY      OOPHORECTOMY      age 28    WI ARTHRODESIS POSTERIOR/PSTLAT TQ 1NTRSPC THORACIC N/A 06/04/2018    Procedure: Reopening of lumbar incision for T12-L5 posterior instrumented fixation and fusion and T12-L4 posterior decompression;  Surgeon: Wood Rogel MD;  Location: [facility];  Service: Neurosurgery    WI COLONOSCOPY FLX DX W/COLLJ SPEC WHEN PFRMD N/A 03/02/2016    Procedure: EGD AND COLONOSCOPY;  Surgeon: Mylene Moeller MD;  [facility]: [facility];  Service: Gastroenterology    WI DILATION ESOPH UNGUIDED SOUND/BOUGIE 1/MULT PASS N/A 09/28/2016    Procedure: DILATATION ESOPHAGEAL;  Surgeon: Mylene Moeller MD;  [facility]: [facility];  Service: Gastroenterology    WI ESOPHAGOGASTRODUODENOSCOPY TRANSORAL DIAGNOSTIC N/A 07/18/2016    Procedure: ESOPHAGOGASTRODUODENOSCOPY (EGD);  Surgeon: Mylene Moeller MD;  [facility];  Service: Gastroenterology    WI INSJ/RPLCMT SPINAL NPG/RCVR POCKET CRTJ&CONNJ Left 06/04/2018    Procedure: removal of left buttock implantable pulse generator and placement of new  implantable pulse generator;  Surgeon: Wood Rogel MD;  [facility]: [facility];  Service: Neurosurgery       Meds/Allergies:  Prior to Admission medications     Medication Sig Start Date End Date Taking? Authorizing Provider   albuterol (PROVENTIL HFA,VENTOLIN HFA) 90 mcg/act inhaler Inhale 2 puffs every 6 (six) hours as needed for wheezing or shortness of breath 6/29/22   NANY Pollock   aspirin 81 mg chewable tablet Chew 81 mg daily    Historical Provider, MD   atorvastatin (LIPITOR) 40 mg tablet TAKE ONE TABLET BY MOUTH ONCE DAILY 11/22/22   NANY Pollock   baclofen 10 mg tablet Take 10 mg by mouth 2 (two) times a day as needed for muscle spasms    Historical Provider, MD   Blood Glucose Monitoring Suppl (OneTouch Verio Reflect) w/Device KIT Check blood sugars twice daily. Please substitute with appropriate alternative as covered by patient's insurance. Dx: E11.65 2/20/23   NANY Pollock   cholestyramine (QUESTRAN) 4 g packet Take 1 packet (4 g total) by mouth 3 (three) times a day with meals 10/27/22   Venice Baires DO   clopidogrel (Plavix) 75 mg tablet Take 1 tablet (75 mg total) by mouth daily 12/1/23 2/29/24  Faheem Anne PA-C   ferrous sulfate 325 (65 Fe) mg tablet Take 1 tablet (325 mg total) by mouth daily with breakfast 1/10/24   Paco Boyer MD   glucose blood (OneTouch Verio) test strip Check blood sugars twice daily. Please substitute with appropriate alternative as covered by patient's insurance. Dx: E11.65 2/20/23   NANY Pollock   levothyroxine 25 mcg tablet Take 1.5 tablets (37.5 mcg total) by mouth daily 8/9/22 1/10/24  NANY Pollock   meclizine (ANTIVERT) 25 mg tablet Take 1 tablet (25 mg total) by mouth 4 (four) times a day as needed for dizziness 3/26/23 1/10/24  Venice Baires DO   metFORMIN (GLUCOPHAGE) 1000 MG tablet Take 1 tablet (1,000 mg total) by mouth 2 (two) times a day with meals 2/10/23   NANY Pollock   metoprolol succinate (TOPROL-XL) 50 mg 24 hr tablet Take 1 tablet (50 mg total) by mouth every 12 (twelve) hours 11/15/23   Gustabo Lara MD   Milnacipran HCl  (Savella) 100 MG TABS Take 1 tablet (100 mg total) by mouth daily 3/5/23   Venice Baires, DO   OneTouch Delica Lancets 33G MISC Check blood sugars twice daily. Please substitute with appropriate alternative as covered by patient's insurance. Dx: E11.65 2/20/23   NANY Pollock   oxyCODONE-acetaminophen (PERCOCET)  mg per tablet Take 1 tablet by mouth every 6 (six) hours as needed for moderate pain    Historical Provider, MD   pantoprazole (PROTONIX) 40 mg tablet Take 1 tablet (40 mg total) by mouth daily 1/10/24 4/9/24  Paco Boyer MD   ranolazine (RANEXA) 500 mg 12 hr tablet Take 500 mg by mouth 2 (two) times a day    Historical Provider, MD   trospium (SANCTURA XR) 60 mg 24 hr capsule Take 1 capsule (60 mg total) by mouth daily before breakfast 4/3/23   NANY Pollock   escitalopram (Lexapro) 20 mg tablet Take 0.5 tablets (10 mg total) by mouth daily  Patient not taking: Reported on 1/13/2024 6/29/22 1/13/24  NANY Pollock   sucralfate (CARAFATE) 1 g tablet Take 1 tablet (1 g total) by mouth 4 (four) times a day 7/18/22 10/13/22  Reese Peña MD     I have reviewed home medications with patient family member.    Allergies:   Allergies   Allergen Reactions    Penicillins Anaphylaxis, Hives and Other (See Comments)     Other reaction(s): Unknown Reaction    Sulfa Antibiotics Anaphylaxis and Other (See Comments)     Other reaction(s): Unknown Reaction    Aspartame - Food Allergy Other (See Comments) and Hypertension     Slurred speech, weakness, stroke sx    Iodinated Contrast Media Hives     Pt has taken prep prior for contrast and has not had any break through reaction    Keflex [Cephalexin] Hives       Social History:  Marital Status:    Occupation: Retired  Patient Pre-hospital Living Situation: Home with son  Patient Pre-hospital Level of Mobility: walks with walker,   Patient Pre-hospital Diet Restrictions: None  Substance Use History:   Social History      Substance and Sexual Activity   Alcohol Use Not Currently    Comment: Denied history of alcohol use     Social History     Tobacco Use   Smoking Status Former    Current packs/day: 0.00    Types: Cigarettes    Quit date: 1970    Years since quittin.0   Smokeless Tobacco Never   Tobacco Comments    Denied history of current ever day smoker, Former smoker and Never smoker all documented in [employer]     Social History     Substance and Sexual Activity   Drug Use No    Comment: Denied history of drug use       Family History:  Family History   Problem Relation Age of Onset    Lung cancer Mother 46    Pulmonary embolism Father     No Known Problems Sister     No Known Problems Daughter     No Known Problems Daughter     Stroke Maternal Grandmother     Heart attack Maternal Grandfather     No Known Problems Paternal Grandmother     No Known Problems Paternal Grandfather     No Known Problems Maternal Aunt     Diabetes Family         Diabetes mellitus    Hypertension Family     Stroke Family         Stroke complications       Physical Exam:     Vitals:   Blood Pressure: (!) 179/90 (24 1718)  Pulse: (!) 108 (24 1718)  Temperature: 98.2 °F (36.8 °C) (24 1414)  Temp Source: Oral (24 1414)  Respirations: 16 (24 1718)  Weight - Scale: 75.6 kg (166 lb 10.7 oz) (24 1327)  SpO2: 93 % (24 1718)    Physical Exam  HENT:      Head: Normocephalic and atraumatic.      Nose: No congestion.      Mouth/Throat:      Mouth: Mucous membranes are dry.   Eyes:      General: No scleral icterus.        Right eye: No discharge.         Left eye: No discharge.      Conjunctiva/sclera: Conjunctivae normal.   Cardiovascular:      Rate and Rhythm: Tachycardia present. Rhythm irregular.      Pulses: Normal pulses.      Heart sounds: Murmur (systolic) heard.   Pulmonary:      Effort: Pulmonary effort is normal.      Breath sounds: Normal breath sounds. No wheezing, rhonchi or rales.   Abdominal:       Palpations: Abdomen is soft. There is no mass.      Tenderness: There is abdominal tenderness (non-localized). There is no right CVA tenderness, left CVA tenderness or guarding.   Musculoskeletal:      Cervical back: Neck supple.      Right lower leg: Edema (1+) present.      Left lower leg: Edema (1+) present.   Skin:     General: Skin is warm and dry.      Capillary Refill: Capillary refill takes 2 to 3 seconds.   Neurological:      Mental Status: She is lethargic, disoriented and confused.         Additional Data:     Lab Results:  Results from last 7 days   Lab Units 01/13/24  1354   WBC Thousand/uL 10.09   HEMOGLOBIN g/dL 11.1*   HEMATOCRIT % 34.3*   PLATELETS Thousands/uL 241   NEUTROS PCT % 78*   LYMPHS PCT % 9*   MONOS PCT % 11   EOS PCT % 1     Results from last 7 days   Lab Units 01/13/24  1354   SODIUM mmol/L 132*   POTASSIUM mmol/L 4.3   CHLORIDE mmol/L 96   CO2 mmol/L 25   BUN mg/dL 31*   CREATININE mg/dL 1.69*   ANION GAP mmol/L 11   CALCIUM mg/dL 8.0*   ALBUMIN g/dL 4.0   TOTAL BILIRUBIN mg/dL 0.47   ALK PHOS U/L 83   ALT U/L 8   AST U/L 15   GLUCOSE RANDOM mg/dL 182*     Results from last 7 days   Lab Units 01/13/24  1354   INR  0.88     Results from last 7 days   Lab Units 01/13/24  1804 01/13/24  1340   POC GLUCOSE mg/dl 209* 203*         Results from last 7 days   Lab Units 01/13/24  1354   LACTIC ACID mmol/L 1.1   PROCALCITONIN ng/ml 0.08       Lines/Drains:  Invasive Devices       Peripheral Intravenous Line  Duration             Peripheral IV 01/13/24 Left Antecubital <1 day              Drain  Duration             Urethral Catheter Latex 16 Fr. 45 days                  Urinary Catheter:  Goal for removal: Remove after 48 hrs of I/O monitoring             Imaging: Reviewed radiology reports from this admission including: abdominal/pelvic CT and xray(s)  CT abdomen pelvis wo contrast   Final Result by Oz Ham MD (01/13 1528)      1.  Thickened loops of jejunum in the right upper  quadrant with possible mild adjacent mesenteric edema though this may be artifactual. A low-grade enteritis is not excluded.      2.  Mild fecal stasis predominantly in the left colon.            Workstation performed: [id number]         XR chest 1 view portable   ED Interpretation by Naveen Pittman MD (01/13 9219)   Per my independent interpretation: No acute cardiopulmonary process          EKG and Other Studies Reviewed on Admission:   EKG: Sinus Tachycardia.  with frequent premature atrial complexes in bigeminy.    ** Please Note: This note has been constructed using a voice recognition system. **

## 2024-01-13 NOTE — ASSESSMENT & PLAN NOTE
Patient with abnormal urinalysis growing Enterococcus faecalis on 1/10  History of recurrent UTI with drug-resistant organisms  Was not treated on 1/10 given MDRO history pending sensitivities  Reports dysuria and flank pain  Urinalysis 1/13 clean                                                    Ampicillin                 <=2.00 ug/ml   Susceptible    Levofloxacin   <=1.00 ug/ml Susceptible    Nitrofurantoin  <=32 ug/ml Susceptible    Tetracycline  >8 ug/ml Resistant    Vancomycin   1.00 ug/ml Susceptible        Patient is allergic to ampicillin and cephalosporins    Plan:  Will treat UTI given urinary symptoms, AMS, and recent positive urinalysis   Vancomycin initiated 1/13

## 2024-01-13 NOTE — ASSESSMENT & PLAN NOTE
Blood Pressure: (!) 190/84  Likely in setting of medical noncompliance    Plan:  Restart home metoprolol succinate 50 Mg twice daily  Continue to monitor, consider adding additional antihypertensive medication pending response  Can consider as needed hydralazine if needed for uncontrolled hypertension in the meantime

## 2024-01-13 NOTE — LETTER
FAX      To:     [employer]   Company:  Phone:       Fax:        Email:        From:   Rosario Maeming  Phone:       Fax:    Email:      ________________________________________________    AVS and Summary of Care for Mattie Varela - transferring to [facility] ([facility]) today at 10:00am - [facility].     Thanks!      NOTICE:  This communication, including attachments, may contain information that is confidential and protected by the -client or other privilege(s).

## 2024-01-14 LAB
ALBUMIN SERPL BCP-MCNC: 3.1 G/DL (ref 3.5–5)
ALP SERPL-CCNC: 58 U/L (ref 34–104)
ALT SERPL W P-5'-P-CCNC: 6 U/L (ref 7–52)
ANION GAP SERPL CALCULATED.3IONS-SCNC: 8 MMOL/L
AST SERPL W P-5'-P-CCNC: 11 U/L (ref 13–39)
BASOPHILS # BLD AUTO: 0.01 THOUSANDS/ÂΜL (ref 0–0.1)
BASOPHILS NFR BLD AUTO: 0 % (ref 0–1)
BILIRUB SERPL-MCNC: 0.44 MG/DL (ref 0.2–1)
BUN SERPL-MCNC: 22 MG/DL (ref 5–25)
CALCIUM ALBUM COR SERPL-MCNC: 8.3 MG/DL (ref 8.3–10.1)
CALCIUM SERPL-MCNC: 7.6 MG/DL (ref 8.4–10.2)
CHLORIDE SERPL-SCNC: 107 MMOL/L (ref 96–108)
CO2 SERPL-SCNC: 25 MMOL/L (ref 21–32)
CREAT SERPL-MCNC: 1.08 MG/DL (ref 0.6–1.3)
EOSINOPHIL # BLD AUTO: 0.05 THOUSAND/ÂΜL (ref 0–0.61)
EOSINOPHIL NFR BLD AUTO: 1 % (ref 0–6)
ERYTHROCYTE [DISTWIDTH] IN BLOOD BY AUTOMATED COUNT: 14.6 % (ref 11.6–15.1)
GFR SERPL CREATININE-BSD FRML MDRD: 48 ML/MIN/1.73SQ M
GLUCOSE P FAST SERPL-MCNC: 130 MG/DL (ref 65–99)
GLUCOSE SERPL-MCNC: 118 MG/DL (ref 65–140)
GLUCOSE SERPL-MCNC: 130 MG/DL (ref 65–140)
GLUCOSE SERPL-MCNC: 148 MG/DL (ref 65–140)
GLUCOSE SERPL-MCNC: 161 MG/DL (ref 65–140)
GLUCOSE SERPL-MCNC: 163 MG/DL (ref 65–140)
GLUCOSE SERPL-MCNC: 323 MG/DL (ref 65–140)
HCT VFR BLD AUTO: 27.5 % (ref 34.8–46.1)
HGB BLD-MCNC: 8.7 G/DL (ref 11.5–15.4)
IMM GRANULOCYTES # BLD AUTO: 0.03 THOUSAND/UL (ref 0–0.2)
IMM GRANULOCYTES NFR BLD AUTO: 0 % (ref 0–2)
LYMPHOCYTES # BLD AUTO: 1.02 THOUSANDS/ÂΜL (ref 0.6–4.47)
LYMPHOCYTES NFR BLD AUTO: 14 % (ref 14–44)
MAGNESIUM SERPL-MCNC: 0.8 MG/DL (ref 1.9–2.7)
MCH RBC QN AUTO: 29.7 PG (ref 26.8–34.3)
MCHC RBC AUTO-ENTMCNC: 31.6 G/DL (ref 31.4–37.4)
MCV RBC AUTO: 94 FL (ref 82–98)
MONOCYTES # BLD AUTO: 0.94 THOUSAND/ÂΜL (ref 0.17–1.22)
MONOCYTES NFR BLD AUTO: 13 % (ref 4–12)
NEUTROPHILS # BLD AUTO: 5.06 THOUSANDS/ÂΜL (ref 1.85–7.62)
NEUTS SEG NFR BLD AUTO: 72 % (ref 43–75)
NRBC BLD AUTO-RTO: 0 /100 WBCS
OSMOLALITY UR/SERPL-RTO: 298 MMOL/KG (ref 282–298)
OSMOLALITY UR: 379 MMOL/KG
PLATELET # BLD AUTO: 225 THOUSANDS/UL (ref 149–390)
PMV BLD AUTO: 10.4 FL (ref 8.9–12.7)
POTASSIUM SERPL-SCNC: 4.5 MMOL/L (ref 3.5–5.3)
PROT SERPL-MCNC: 5.6 G/DL (ref 6.4–8.4)
RBC # BLD AUTO: 2.93 MILLION/UL (ref 3.81–5.12)
SODIUM 24H UR-SCNC: 56 MOL/L
SODIUM SERPL-SCNC: 140 MMOL/L (ref 135–147)
VANCOMYCIN SERPL-MCNC: 7.8 UG/ML (ref 10–20)
WBC # BLD AUTO: 7.11 THOUSAND/UL (ref 4.31–10.16)

## 2024-01-14 PROCEDURE — 85025 COMPLETE CBC W/AUTO DIFF WBC: CPT

## 2024-01-14 PROCEDURE — 83930 ASSAY OF BLOOD OSMOLALITY: CPT

## 2024-01-14 PROCEDURE — 80053 COMPREHEN METABOLIC PANEL: CPT

## 2024-01-14 PROCEDURE — 82948 REAGENT STRIP/BLOOD GLUCOSE: CPT

## 2024-01-14 PROCEDURE — 99232 SBSQ HOSP IP/OBS MODERATE 35: CPT | Performed by: INTERNAL MEDICINE

## 2024-01-14 PROCEDURE — 36415 COLL VENOUS BLD VENIPUNCTURE: CPT

## 2024-01-14 PROCEDURE — 83735 ASSAY OF MAGNESIUM: CPT

## 2024-01-14 PROCEDURE — 80202 ASSAY OF VANCOMYCIN: CPT

## 2024-01-14 RX ORDER — MAGNESIUM SULFATE HEPTAHYDRATE 40 MG/ML
4 INJECTION, SOLUTION INTRAVENOUS ONCE
Status: COMPLETED | OUTPATIENT
Start: 2024-01-14 | End: 2024-01-14

## 2024-01-14 RX ORDER — AMLODIPINE BESYLATE 5 MG/1
5 TABLET ORAL DAILY
Status: DISCONTINUED | OUTPATIENT
Start: 2024-01-14 | End: 2024-01-17 | Stop reason: HOSPADM

## 2024-01-14 RX ORDER — VANCOMYCIN HYDROCHLORIDE 1 G/200ML
1000 INJECTION, SOLUTION INTRAVENOUS ONCE
Status: DISCONTINUED | OUTPATIENT
Start: 2024-01-14 | End: 2024-01-14 | Stop reason: CLARIF

## 2024-01-14 RX ORDER — VANCOMYCIN HYDROCHLORIDE 1 G/200ML
1000 INJECTION, SOLUTION INTRAVENOUS EVERY 24 HOURS
Status: DISCONTINUED | OUTPATIENT
Start: 2024-01-14 | End: 2024-01-16

## 2024-01-14 RX ADMIN — CLOPIDOGREL BISULFATE 75 MG: 75 TABLET ORAL at 09:00

## 2024-01-14 RX ADMIN — AMLODIPINE BESYLATE 5 MG: 5 TABLET ORAL at 11:05

## 2024-01-14 RX ADMIN — LEVOTHYROXINE SODIUM 37.5 MCG: 75 TABLET ORAL at 07:03

## 2024-01-14 RX ADMIN — SENNOSIDES, DOCUSATE SODIUM 1 TABLET: 8.6; 5 TABLET ORAL at 21:50

## 2024-01-14 RX ADMIN — HEPARIN SODIUM 5000 UNITS: 5000 INJECTION INTRAVENOUS; SUBCUTANEOUS at 14:50

## 2024-01-14 RX ADMIN — INSULIN LISPRO 5 UNITS: 100 INJECTION, SOLUTION INTRAVENOUS; SUBCUTANEOUS at 21:51

## 2024-01-14 RX ADMIN — NYSTATIN: 100000 POWDER TOPICAL at 17:11

## 2024-01-14 RX ADMIN — ACETAMINOPHEN 975 MG: 325 TABLET, FILM COATED ORAL at 21:50

## 2024-01-14 RX ADMIN — METOPROLOL SUCCINATE 50 MG: 50 TABLET, EXTENDED RELEASE ORAL at 21:50

## 2024-01-14 RX ADMIN — POLYETHYLENE GLYCOL 3350 17 G: 17 POWDER, FOR SOLUTION ORAL at 08:57

## 2024-01-14 RX ADMIN — ASPIRIN 81 MG CHEWABLE TABLET 81 MG: 81 TABLET CHEWABLE at 08:59

## 2024-01-14 RX ADMIN — RANOLAZINE 500 MG: 500 TABLET, FILM COATED, EXTENDED RELEASE ORAL at 21:50

## 2024-01-14 RX ADMIN — RANOLAZINE 500 MG: 500 TABLET, FILM COATED, EXTENDED RELEASE ORAL at 11:05

## 2024-01-14 RX ADMIN — NYSTATIN 1 APPLICATION: 100000 POWDER TOPICAL at 09:01

## 2024-01-14 RX ADMIN — INSULIN LISPRO 1 UNITS: 100 INJECTION, SOLUTION INTRAVENOUS; SUBCUTANEOUS at 17:16

## 2024-01-14 RX ADMIN — FERROUS SULFATE TAB 325 MG (65 MG ELEMENTAL FE) 325 MG: 325 (65 FE) TAB at 07:03

## 2024-01-14 RX ADMIN — MAGNESIUM SULFATE IN WATER 4 G: 4 INJECTION, SOLUTION INTRAVENOUS at 08:55

## 2024-01-14 RX ADMIN — VANCOMYCIN HYDROCHLORIDE 1000 MG: 1 INJECTION, SOLUTION INTRAVENOUS at 10:22

## 2024-01-14 RX ADMIN — ATORVASTATIN CALCIUM 40 MG: 40 TABLET, FILM COATED ORAL at 08:58

## 2024-01-14 RX ADMIN — HEPARIN SODIUM 5000 UNITS: 5000 INJECTION INTRAVENOUS; SUBCUTANEOUS at 21:50

## 2024-01-14 RX ADMIN — HEPARIN SODIUM 5000 UNITS: 5000 INJECTION INTRAVENOUS; SUBCUTANEOUS at 06:18

## 2024-01-14 RX ADMIN — ACETAMINOPHEN 975 MG: 325 TABLET, FILM COATED ORAL at 14:50

## 2024-01-14 RX ADMIN — PANTOPRAZOLE SODIUM 40 MG: 40 TABLET, DELAYED RELEASE ORAL at 08:59

## 2024-01-14 RX ADMIN — METOPROLOL SUCCINATE 50 MG: 50 TABLET, EXTENDED RELEASE ORAL at 09:01

## 2024-01-14 NOTE — ASSESSMENT & PLAN NOTE
Recent Labs     01/13/24  1354 01/14/24  0558   HGB 11.1* 8.7*       Continue home ferrous sulfate 325 mg daily

## 2024-01-14 NOTE — ASSESSMENT & PLAN NOTE
Recent Labs     01/13/24  1354 01/14/24  0710 01/15/24  0513   CREATININE 1.69* 1.08 0.97   EGFR 28 48 55     Estimated Creatinine Clearance: 40.8 mL/min (by C-G formula based on SCr of 0.97 mg/dL).    Baseline creatinine appears to be 1-1.2  Per chart review patient seems to have frequent SADAF's  S/p 1 L normal saline in ED  Per family, patient has been refusing p.o. intake for several days and retaining urine  Lack of sis azotemia    Suspected etiology: Dehydration  Currently resolved    Plan:  Strict I's and O's  Urinary retention protocol

## 2024-01-14 NOTE — ASSESSMENT & PLAN NOTE
Lab Results   Component Value Date    [id number] 3.899 11/26/2023     Continue home levothyroxine 37.5 mcg  Mind that the patient may have recently been noncompliant, TSH out of limits may not indicate need for dose change

## 2024-01-14 NOTE — ASSESSMENT & PLAN NOTE
Blood Pressure: 135/63  Likely in setting of medical noncompliance    Plan:  Continue home metoprolol succinate 50 Mg twice daily  Continue to monitor, consider adding additional antihypertensive medication pending response  Can consider as needed hydralazine if needed for uncontrolled hypertension in the meantime

## 2024-01-14 NOTE — PROGRESS NOTES
[facility]  Progress Note  Name: Mattie Varela I  MRN: [id number]  Unit/Bed#: ED-30 I Date of Admission: 1/13/2024   Date of Service: 1/14/2024 I Hospital Day: 0    Assessment/Plan   * AMS (altered mental status)  Assessment & Plan  Patient with altered mental status since evening of 1/12  In context of UTI  Patient's baseline allows for a normal conversation with attention and thought process per family  Denies recent injury, absent of focal neurologic defect  Complicated by likely medical noncompliance in recent days  Possible urinary retention  Significant back pain reported 10/10 - unclear contribution of chronic back pain vs acute flank pain    Patient is high risk of delirium due to acute encephalopathy    Plan:  Initiate delirium precautions  Maintain normal sleep/wake cycle  Minimize overnight interruptions, group overnight vitals/labs/nursing checks as possible  Dim lights, close blinds and turn off tv to minimize stimulation and encourage sleep environment in evenings  Ensure that pain is well controlled; Tylenol 975mg Q8H scheduled  Monitor for fecal and urinary retention which may precipitate delirium, initiated to urinary retention protocol  Encourage early mobilization and ambulation  Provide frequent reorientation and redirection  Encourage family and friends at the bedside to help help calm patient if anxious  Avoid medications which may precipitate or worsen delirium such as tramadol, benzodiazepine, anticholinergics, and benadryl  Encourage hydration and nutrition   Redirect unwanted behaviors as first line, avoid physical restraints, use chemical restraint only if all other attempts have been Unsuccessful, would consider Zyprexa 2.5mg IM Q, monitor for orthostatic hypotension and QTc prolongation with repeat dosing, recommend lowest dose possible for shortest duration possible     Treatment for UTI as noted under abnormal urinalysis  Continued patient's home meds with  the exception of opiates and muscarinic antagonist  Frequent reassessments for improvement as patient is treated for UTI and likely dehydration  No need for head imaging at this time given low suspicion for intracranial abnormality      Encounter for examination for admission to nursing home  Assessment & Plan  Currently lives with disabled son  Family reports medical noncompliance  Patient minimally ambulatory only able to transfer to bathroom  Significant medical need, unable to provide for self  Family report that they feel unable to provide patient with necessary care at home any longer, request placement after this admission    Plan:  Consult to case management    Hyponatremia  Assessment & Plan  Lab Results   Component Value Date    SODIUM 140 01/14/2024    SODIUM 132 (L) 01/13/2024    SODIUM 135 01/10/2024      Suspected etiology: Low solute intake    Received 1 L normal saline in ED    Plan:  Follow-up serum osmolality, urine osmolality, urine sodium  Isolyte 75 cc/h  Encourage p.o. intake  Monitor with daily metabolic panel    Hypertensive urgency  Assessment & Plan  Blood Pressure: 135/63  Likely in setting of medical noncompliance    Plan:  Continue home metoprolol succinate 50 Mg twice daily  Continue to monitor, consider adding additional antihypertensive medication pending response  Can consider as needed hydralazine if needed for uncontrolled hypertension in the meantime    Abnormal urinalysis  Assessment & Plan  Patient with abnormal urinalysis growing Enterococcus faecalis on 1/10  History of recurrent UTI with drug-resistant organisms  Was not treated on 1/10 given MDRO history pending sensitivities  Reports dysuria and flank pain  Urinalysis 1/13 clean                                                    Ampicillin                 <=2.00 ug/ml   Susceptible    Levofloxacin   <=1.00 ug/ml Susceptible    Nitrofurantoin  <=32 ug/ml Susceptible    Tetracycline  >8 ug/ml Resistant    Vancomycin   1.00  ug/ml Susceptible        Patient is allergic to ampicillin and cephalosporins    Plan:  Will treat UTI given urinary symptoms, AMS, and recent positive urinalysis   Vancomycin initiated 1/13      Cerebrovascular accident (CVA), unspecified mechanism (HCC)  Assessment & Plan  History of CVA in addition to CAD    Plan:  Continue home Plavix and statin    Acute kidney injury (HCC)  Assessment & Plan  Recent Labs     01/13/24  1354 01/14/24  0710   CREATININE 1.69* 1.08   EGFR 28 48       Estimated Creatinine Clearance: 37.8 mL/min (by C-G formula based on SCr of 1.08 mg/dL).    Baseline creatinine appears to be 1-1.2  Per chart review patient seems to have frequent SADAF's  S/p 1 L normal saline in ED  Per family, patient has been refusing p.o. intake for several days and retaining urine  Lack of sis azotemia    Suspected etiology: Dehydration    Plan:  Strict I's and O's  Gentle hydration with Isolyte 75 cc/h  Urinary retention protocol    Hypothyroidism  Assessment & Plan  Lab Results   Component Value Date    [id number] 3.899 11/26/2023     Continue home levothyroxine 37.5 mcg  Mind that the patient may have recently been noncompliant, TSH out of limits may not indicate need for dose change    Iron deficiency anemia secondary to inadequate dietary iron intake  Assessment & Plan  Recent Labs     01/13/24  1354 01/14/24  0558   HGB 11.1* 8.7*       Continue home ferrous sulfate 325 mg daily    Type 2 diabetes mellitus with other skin complications ([facility])  Assessment & Plan  Lab Results   Component Value Date    HGBA1C 6.5 (H) 11/26/2023       Recent Labs     01/13/24  1340 01/13/24  1804 01/14/24  0048 01/14/24  0733   POCGLU 203* 209* 148* 118         Blood Sugar Average: Last 72 hrs:  (P) 169.5    Hold home metformin  Insulin sliding scale  Hypoglycemic protocol             VTE Pharmacologic Prophylaxis: VTE Score: 4 Moderate Risk (Score 3-4) - Pharmacological DVT Prophylaxis Ordered: heparin.    Mobility:   Basic  Mobility Inpatient Raw Score: 6  -HLM Goal: 2: Bed activities/Dependent transfer  JH-HLM Achieved: 1: Laying in bed  HLM Goal NOT achieved. Continue with multidisciplinary rounding and encourage appropriate mobility to improve upon HLM goals.    Patient Centered Rounds: I evaluated the patient without nursing staff present due to Patient in ED pending transfer  Discussions with Specialists or Other Care Team Provider: N/A     Education and Discussions with Family / Patient: Updated  (daughter) via phone.    Current Length of Stay: 0 day(s)  Current Patient Status: Inpatient   Discharge Plan: Anticipate discharge in 24-48 hrs to be determined    Code Status: Level 3 - DNAR and DNI    Subjective:   No acute events overnight. Patient sitting up in bed in no acute distress. Discussed inpatient management of UTI and pending PT/OT evaluations. Patient verbalized agreement.     Objective:     Vitals:   Temp (24hrs), Av.3 °F (36.8 °C), Min:98.2 °F (36.8 °C), Max:98.4 °F (36.9 °C)    Temp:  [98.2 °F (36.8 °C)-98.4 °F (36.9 °C)] 98.4 °F (36.9 °C)  HR:  [] 95  Resp:  [16-20] 16  BP: (122-215)/() 150/75  SpO2:  [93 %-98 %] 94 %  Body mass index is 32.72 kg/m².     Input and Output Summary (last 24 hours):     Intake/Output Summary (Last 24 hours) at 2024 1003  Last data filed at 2024 0705  Gross per 24 hour   Intake 1250 ml   Output 1400 ml   Net -150 ml       Physical Exam:   Physical Exam  HENT:      Head: Normocephalic and atraumatic.      Nose: No congestion.      Mouth/Throat:      Mouth: Mucous membranes are dry.   Eyes:      General: No scleral icterus.        Right eye: No discharge.         Left eye: No discharge.      Conjunctiva/sclera: Conjunctivae normal.   Cardiovascular:      Rate and Rhythm: Tachycardia present. Rhythm irregular.      Pulses: Normal pulses.      Heart sounds: Murmur (systolic) heard.   Pulmonary:      Effort: Pulmonary effort is normal.      Breath  sounds: Normal breath sounds. No wheezing, rhonchi or rales.   Abdominal:      Palpations: Abdomen is soft. There is no mass.      Tenderness: There is abdominal tenderness (non-localized). There is no right CVA tenderness, left CVA tenderness or guarding.   Musculoskeletal:      Cervical back: Neck supple.      Right lower leg: Edema (1+) present.      Left lower leg: Edema (1+) present.   Skin:     General: Skin is warm and dry.      Capillary Refill: Capillary refill takes 2 to 3 seconds.   Neurological:      Mental Status: She is lethargic, disoriented and confused.          Additional Data:     Labs:  Results from last 7 days   Lab Units 01/14/24  0558   WBC Thousand/uL 7.11   HEMOGLOBIN g/dL 8.7*   HEMATOCRIT % 27.5*   PLATELETS Thousands/uL 225   NEUTROS PCT % 72   LYMPHS PCT % 14   MONOS PCT % 13*   EOS PCT % 1     Results from last 7 days   Lab Units 01/14/24  0710   SODIUM mmol/L 140   POTASSIUM mmol/L 4.5   CHLORIDE mmol/L 107   CO2 mmol/L 25   BUN mg/dL 22   CREATININE mg/dL 1.08   ANION GAP mmol/L 8   CALCIUM mg/dL 7.6*   ALBUMIN g/dL 3.1*   TOTAL BILIRUBIN mg/dL 0.44   ALK PHOS U/L 58   ALT U/L 6*   AST U/L 11*   GLUCOSE RANDOM mg/dL 130     Results from last 7 days   Lab Units 01/13/24  1354   INR  0.88     Results from last 7 days   Lab Units 01/14/24  0733 01/14/24  0048 01/13/24  1804 01/13/24  1340   POC GLUCOSE mg/dl 118 148* 209* 203*         Results from last 7 days   Lab Units 01/13/24  1354   LACTIC ACID mmol/L 1.1   PROCALCITONIN ng/ml 0.08       Lines/Drains:  Invasive Devices       Peripheral Intravenous Line  Duration             Peripheral IV 01/13/24 Left Antecubital <1 day              Drain  Duration             External Urinary Catheter <1 day                          Imaging: Reviewed radiology reports from this admission including: abdominal/pelvic CT    Recent Cultures (last 7 days):   Results from last 7 days   Lab Units 01/13/24  1438 01/13/24  1354 01/10/24  1847   BLOOD  CULTURE  Received in Microbiology Lab. Culture in Progress. Received in Microbiology Lab. Culture in Progress.  --    URINE CULTURE   --   --  40,000-49,000 cfu/ml Enterococcus faecalis*  20,000-29,000 cfu/ml       Last 24 Hours Medication List:   Current Facility-Administered Medications   Medication Dose Route Frequency Provider Last Rate    acetaminophen  975 mg Oral Q8H [facility] Zak Jones MD      albuterol  2 puff Inhalation Q6H PRN Zak Jones MD      aspirin  81 mg Oral Daily Zak Jones MD      atorvastatin  40 mg Oral Daily Zak Jones MD      baclofen  10 mg Oral BID PRN Zak Jones MD      clopidogrel  75 mg Oral Daily Zak Jones MD      ferrous sulfate  325 mg Oral Daily With Breakfast Zak Jones MD      heparin (porcine)  5,000 Units Subcutaneous Q8H ARY Zak Jones MD      insulin lispro  1-6 Units Subcutaneous TID AC Zak Jones MD      insulin lispro  1-6 Units Subcutaneous HS Zak Jones MD      levothyroxine  37.5 mcg Oral Early Morning Zak Jones MD      lidocaine  1 patch Topical Daily Zak Jones MD      magnesium sulfate  4 g Intravenous Once Ray Velazco MD 4 g (01/14/24 0855)    metoprolol succinate  50 mg Oral Q12H [facility] Zak Jones MD      Milnacipran HCl  1 tablet Oral Daily Zak Jones MD      multi-electrolyte  75 mL/hr Intravenous Continuous Zak Jones MD Stopped (01/14/24 0854)    naloxone  0.04 mg Intravenous Q1MIN PRN Naveen Pittman MD      nystatin   Topical BID Naveen Pittman MD      ondansetron  4 mg Intravenous Q6H PRN Zak Jones MD      pantoprazole  40 mg Oral Daily Zak Jones MD      polyethylene glycol  17 g Oral Daily Zak Jones MD      ranolazine  500 mg Oral Q12H [facility] Zak Jones MD      senna-docusate sodium  1 tablet Oral HS Zak Jones MD      vancomycin  1,000 mg Intravenous Q24H Luis Wiggins MD          Today, Patient Was Seen By: aRy Velazco MD    **Please Note: This note may have  been constructed using a voice recognition system.**

## 2024-01-14 NOTE — ASSESSMENT & PLAN NOTE
Recent Labs     01/13/24  1354 01/14/24  0558 01/15/24  0513   HGB 11.1* 8.7* 8.1*     Continue home ferrous sulfate 325 mg daily

## 2024-01-14 NOTE — ASSESSMENT & PLAN NOTE
Patient with altered mental status since evening of 1/12  In context of UTI  Patient's baseline allows for a normal conversation with attention and thought process per family  Denies recent injury, absent of focal neurologic defect  Complicated by likely medical noncompliance in recent days  Possible urinary retention  Significant back pain reported 10/10 - unclear contribution of chronic back pain vs acute flank pain    Patient is high risk of delirium due to acute encephalopathy    Plan:  Initiate delirium precautions  Maintain normal sleep/wake cycle  Minimize overnight interruptions, group overnight vitals/labs/nursing checks as possible  Dim lights, close blinds and turn off tv to minimize stimulation and encourage sleep environment in evenings  Ensure that pain is well controlled; Tylenol 975mg Q8H scheduled  Monitor for fecal and urinary retention which may precipitate delirium, initiated to urinary retention protocol  Encourage early mobilization and ambulation  Provide frequent reorientation and redirection  Encourage family and friends at the bedside to help help calm patient if anxious  Avoid medications which may precipitate or worsen delirium such as tramadol, benzodiazepine, anticholinergics, and benadryl  Encourage hydration and nutrition   Redirect unwanted behaviors as first line, avoid physical restraints, use chemical restraint only if all other attempts have been Unsuccessful, would consider Zyprexa 2.5mg IM Q, monitor for orthostatic hypotension and QTc prolongation with repeat dosing, recommend lowest dose possible for shortest duration possible     Treatment for UTI as noted under abnormal urinalysis  Continued patient's home meds with the exception of opiates and muscarinic antagonist  Frequent reassessments for improvement as patient is treated for UTI and likely dehydration  No need for head imaging at this time given low suspicion for intracranial abnormality

## 2024-01-14 NOTE — ASSESSMENT & PLAN NOTE
Lab Results   Component Value Date    SODIUM 140 01/14/2024    SODIUM 132 (L) 01/13/2024    SODIUM 135 01/10/2024      Suspected etiology: Low solute intake    Received 1 L normal saline in ED    Plan:  Follow-up serum osmolality, urine osmolality, urine sodium  Isolyte 75 cc/h  Encourage p.o. intake  Monitor with daily metabolic panel

## 2024-01-14 NOTE — PROGRESS NOTES
Mattie Varela is a 78 y.o. female who is currently ordered Vancomycin IV with management by the Pharmacy Consult service.  Relevant clinical data and objective / subjective history reviewed.  Vancomycin Assessment:  Indication and Goal AUC/Trough: Urinary tract infection (goal -600, trough >10)  Clinical Status: stable  Micro:     Renal Function:  SCr: 1.08 mg/dL  CrCl: 37 mL/min  Renal replacement: Not on dialysis  Days of Therapy: 2  Current Dose: 1000 mg IV PRN for random level < or = 15  Vancomycin Plan:  New Dosin mg IV every 24 hours  Estimated AUC: 427 mcg*hr/mL  Estimated Trough: 14.3 mcg/mL  Next Level:  @ 0600  Renal Function Monitoring: Daily BMP and UOP  Pharmacy will continue to follow closely for s/sx of nephrotoxicity, infusion reactions and appropriateness of therapy.  BMP and CBC will be ordered per protocol. We will continue to follow the patient’s culture results and clinical progress daily.    Teresa Chirinos, Pharmacist

## 2024-01-14 NOTE — PLAN OF CARE
Problem: Potential for Falls  Goal: Patient will remain free of falls  Description: INTERVENTIONS:  - Educate patient/family on patient safety including physical limitations  - Instruct patient to call for assistance with activity   - Consult OT/PT to assist with strengthening/mobility   - Keep Call bell within reach  - Keep bed low and locked with side rails adjusted as appropriate  - Keep care items and personal belongings within reach  - Initiate and maintain comfort rounds  - Make Fall Risk Sign visible to staff  - Offer Toileting every 2 Hours, in advance of need  - Initiate/Maintain bed alarm  - Apply yellow socks and bracelet for high fall risk patients  - Consider moving patient to room near nurses station  Outcome: Progressing     Problem: Prexisting or High Potential for Compromised Skin Integrity  Goal: Skin integrity is maintained or improved  Description: INTERVENTIONS:  - Identify patients at risk for skin breakdown  - Assess and monitor skin integrity  - Assess and monitor nutrition and hydration status  - Monitor labs   - Assess for incontinence   - Turn and reposition patient  - Assist with mobility/ambulation  - Relieve pressure over bony prominences  - Avoid friction and shearing  - Provide appropriate hygiene as needed including keeping skin clean and dry  - Evaluate need for skin moisturizer/barrier cream  - Collaborate with interdisciplinary team   - Patient/family teaching  - Consider wound care consult   Outcome: Progressing     Problem: PAIN - ADULT  Goal: Verbalizes/displays adequate comfort level or baseline comfort level  Description: Interventions:  - Encourage patient to monitor pain and request assistance  - Assess pain using appropriate pain scale  - Administer analgesics based on type and severity of pain and evaluate response  - Implement non-pharmacological measures as appropriate and evaluate response  - Consider cultural and social influences on pain and pain management  -  Notify physician/advanced practitioner if interventions unsuccessful or patient reports new pain  Outcome: Progressing     Problem: INFECTION - ADULT  Goal: Absence or prevention of progression during hospitalization  Description: INTERVENTIONS:  - Assess and monitor for signs and symptoms of infection  - Monitor lab/diagnostic results  - Monitor all insertion sites, i.e. indwelling lines, tubes, and drains  - Monitor endotracheal if appropriate and nasal secretions for changes in amount and color  - [place] appropriate cooling/warming therapies per order  - Administer medications as ordered  - Instruct and encourage patient and family to use good hand hygiene technique  - Identify and instruct in appropriate isolation precautions for identified infection/condition  Outcome: Progressing  Goal: Absence of fever/infection during neutropenic period  Description: INTERVENTIONS:  - Monitor WBC    Outcome: Progressing     Problem: SAFETY ADULT  Goal: Patient will remain free of falls  Description: INTERVENTIONS:  - Educate patient/family on patient safety including physical limitations  - Instruct patient to call for assistance with activity   - Consult OT/PT to assist with strengthening/mobility   - Keep Call bell within reach  - Keep bed low and locked with side rails adjusted as appropriate  - Keep care items and personal belongings within reach  - Initiate and maintain comfort rounds  - Make Fall Risk Sign visible to staff  - Offer Toileting every 2 Hours, in advance of need  - Initiate/Maintain bed alarm  - Apply yellow socks and bracelet for high fall risk patients  - Consider moving patient to room near nurses station  Outcome: Progressing  Goal: Maintain or return to baseline ADL function  Description: INTERVENTIONS:  -  Assess patient's ability to carry out ADLs; assess patient's baseline for ADL function and identify physical deficits which impact ability to perform ADLs (bathing, care of mouth/teeth,  toileting, grooming, dressing, etc.)  - Assess/evaluate cause of self-care deficits   - Assess range of motion  - Assess patient's mobility; develop plan if impaired  - Assess patient's need for assistive devices and provide as appropriate  - Encourage maximum independence but intervene and supervise when necessary  - Involve family in performance of ADLs  - Assess for home care needs following discharge   - Consider OT consult to assist with ADL evaluation and planning for discharge  - Provide patient education as appropriate  Outcome: Progressing  Goal: Maintains/Returns to pre admission functional level  Description: INTERVENTIONS:  - Perform AM-PAC 6 Click Basic Mobility/ Daily Activity assessment daily.  - Set and communicate daily mobility goal to care team and patient/family/caregiver.   - Collaborate with rehabilitation services on mobility goals if consulted  - Out of bed for toileting  - Record patient progress and toleration of activity level   Outcome: Progressing     Problem: DISCHARGE PLANNING  Goal: Discharge to home or other facility with appropriate resources  Description: INTERVENTIONS:  - Identify barriers to discharge w/patient and caregiver  - Arrange for needed discharge resources and transportation as appropriate  - Identify discharge learning needs (meds, wound care, etc.)  - Arrange for interpretive services to assist at discharge as needed  - Refer to Case Management Department for coordinating discharge planning if the patient needs post-hospital services based on physician/advanced practitioner order or complex needs related to functional status, cognitive ability, or social support system  Outcome: Progressing     Problem: Knowledge Deficit  Goal: Patient/family/caregiver demonstrates understanding of disease process, treatment plan, medications, and discharge instructions  Description: Complete learning assessment and assess knowledge base.  Interventions:  - Provide teaching at level  of understanding  - Provide teaching via preferred learning methods  Outcome: Progressing

## 2024-01-14 NOTE — ASSESSMENT & PLAN NOTE
Recent Labs     01/13/24  1354 01/14/24  0710   CREATININE 1.69* 1.08   EGFR 28 48       Estimated Creatinine Clearance: 37.8 mL/min (by C-G formula based on SCr of 1.08 mg/dL).    Baseline creatinine appears to be 1-1.2  Per chart review patient seems to have frequent SADAF's  S/p 1 L normal saline in ED  Per family, patient has been refusing p.o. intake for several days and retaining urine  Lack of sis azotemia    Suspected etiology: Dehydration    Plan:  Strict I's and O's  Gentle hydration with Isolyte 75 cc/h  Urinary retention protocol

## 2024-01-14 NOTE — ASSESSMENT & PLAN NOTE
Lab Results   Component Value Date    HGBA1C 6.5 (H) 11/26/2023       Recent Labs     01/14/24  1511 01/14/24  2049 01/15/24  0730 01/15/24  1131   POCGLU 163* 323* 128 206*       Blood Sugar Average: Last 72 hrs:  (P) [id number]    Hold home metformin  Insulin sliding scale  Hypoglycemic protocol

## 2024-01-14 NOTE — ASSESSMENT & PLAN NOTE
Lab Results   Component Value Date    HGBA1C 6.5 (H) 11/26/2023       Recent Labs     01/13/24  1340 01/13/24  1804 01/14/24  0048 01/14/24  0733   POCGLU 203* 209* 148* 118         Blood Sugar Average: Last 72 hrs:  (P) 169.5    Hold home metformin  Insulin sliding scale  Hypoglycemic protocol

## 2024-01-14 NOTE — ED NOTES
Per pharmacy, milnacipran is a medication pt needs to get from home, pharmacy does not stock it. Pt does not have medication with her. Provider made aware.      Josie Tejeda RN  01/14/24 3234

## 2024-01-14 NOTE — ASSESSMENT & PLAN NOTE
Lab Results   Component Value Date    [id number] 3.899 11/26/2023     Continue home levothyroxine 37.5 mcg  Mind that the patient may have recently been noncompliant, TSH out of limits may not indicate need for dose change

## 2024-01-14 NOTE — PROGRESS NOTES
Mattie Varela is a 78 y.o. female who is currently ordered Vancomycin IV with management by the Pharmacy Consult service.  Relevant clinical data and objective / subjective history reviewed.  Vancomycin Assessment:  Indication and Goal AUC/Trough: Urinary tract infection (goal -600, trough >10)  Clinical Status:  new  Micro:     Renal Function:  SCr: 1.69 mg/dL  CrCl: 23.8 mL/min  Renal replacement: Not on dialysis  Days of Therapy: 1  Current Dose: 1500mg IV loading dose  Vancomycin Plan:  New Dosing IV daily prn level <15  Next Level:  @ 0600  Renal Function Monitoring: Daily BMP and UOP  Pharmacy will continue to follow closely for s/sx of nephrotoxicity, infusion reactions and appropriateness of therapy.  BMP and CBC will be ordered per protocol. We will continue to follow the patient’s culture results and clinical progress daily.    Jovita Jenkins, Pharmacist

## 2024-01-14 NOTE — ED NOTES
Night team 5 messaged regarding patient's status and nausea.      Sara Salomon RN  01/13/24 5508

## 2024-01-14 NOTE — UTILIZATION REVIEW
Initial Clinical Review    Admission: Date/Time/Statement:     OBSERVATION 1-13-24 CHANGED TO INPATIENT 1-14-24 FOR   Admission Orders (From admission, onward)       Ordered        01/14/24 1003  Inpatient Admission  Once            01/13/24 1600  Place in Observation  Once                            ED Arrival Information       Expected   -    Arrival   1/13/2024 13:21    Acuity   Urgent              Means of arrival   Ambulance    Escorted by   [facility]    Service   Hospitalist    Admission type   Emergency              Arrival complaint   EMS             Chief Complaint   Patient presents with    Altered Mental Status     Patient arrived via EMS from home c/o low back pain, possible UTI per EMS       Initial Presentation: 78 y.o. female presents to ed from home via ems for evaluation and treatment of low back pain and possible UTI.  PMHX: DM, IBS, HTN,  FIBROMYALGIA, STROKE, LAMINECTOMY.  Patient reports flank pain, dysuria.  She had not taken medication x 3 days. Daughter reports confusion and rigors today. Recent UA indicates UTI.  Pt lives alone and daughter requests placement. Clinical assessment significant for diffuse CVA tenderness and guarding, hypertension and tachypnea. GCS =14.  , BUN 31, CR 1.69.  Imaging shows possible enteritis, mesenteric edema, fecal stasis left colon.  EKG : atrial tachycardia / no St elevations. 1-10 urine culture shows enterococcus faecalis.  Initially treated with iv .9% ns bolus x1, iv fentanyl x2, iv reglan x1, iv zofran x2, iv multi electrolyte 75.hr, iv vancomycin x1. Admit to observation for altered mental status, hyponatremia, back pain.     Date: 1-14-24    Day 2: observation changed to inpatient.    Trend CBC and BMP. Check vanco random and urine sodium / osmolality.    Mag 0.8. Give Iv mag x 1.  Back pain improved. Hypertension resolved.   Remains confused - waxing and waning GCS 12 in the evening and 14 this am. Plan includes PT/OT evaluations,   continue iv multi electrolyte 75/ hr, iv vancomycin. Check for urinary retention with bladder scan. Continue neuro checks.  Follow up blood.      Date: 1-15-24     Day 3: Has surpassed a 2nd midnight with active treatments and services, which include neuro checks, iv vancomycin, trend CBC  BMP & MAG, replete electrolytes, initiate scheduled tylenol and prn oxycodone for pain management.     ED Triage Vitals   01/13/24 1414 01/13/24 1327 01/13/24 1327 01/13/24 1327 01/13/24 1327   98.2 °F (36.8 °C) 56 16 (!) 177/88 94 %      Oral Monitor         10 - Worst Possible Pain          01/13/24 76 kg (167 lb 8.8 oz)     Additional Vital Signs:     Date/Time Temp Pulse Resp BP MAP (mmHg) SpO2 O2 Device   01/14/24 0901 -- 95 -- 150/75 -- -- --   01/14/24 0900 -- 100 16 150/75 102 94 % None (Room air)   01/13/24 2300 98.4 °F (36.9 °C) 108 Abnormal  18 151/111 Abnormal  -- 94 % None (Room air)   01/13/24 2230 -- 107 Abnormal  18 160/110 Abnormal  -- 94 % None (Room air)   01/13/24 2217 -- 108 Abnormal  18 140/101 Abnormal  -- 95 % None (Room air)   01/13/24 2000 -- 102 18 183/83 Abnormal  -- 95 % None (Room air)   01/13/24 1908 -- 102 20 183/86 Abnormal  -- 93 % None (Room air)   01/13/24 1718 -- 108 Abnormal  16 179/90 Abnormal  126 93 % None (Room air)   01/13/24 1615 -- 109 Abnormal  17 190/84 Abnormal  -- 97 % --   01/13/24 1608 -- -- -- 204/102 Abnormal  -- -- --   01/13/24 1556 -- -- -- 215/103 Abnormal   -- -- --   01/13/24 1553 -- 112 Abnormal  18 -- -- -- None (Room air)   01/13/24 1430 -- 109 Abnormal  16 -- -- 95 % None (Room air)   01/13/24 1414 98.2 °F (36.8 °C) -- -- -- -- -- --   01/13/24 1340 -- 110 Abnormal  -- -- -- -- --   01/13/24 1327 -- 56 16 177/88 Abnormal  122 94 % None (Room air)         Pertinent Labs/Diagnostic Test Results:     CT abdomen pelvis wo contrast   Final 01/13 1528)      1.  Thickened loops of jejunum in the right upper quadrant with possible mild adjacent mesenteric edema though this may  be artifactual. A low-grade enteritis is not excluded.      2.  Mild fecal stasis predominantly in the left colon.         XR chest 1 view portable   ED  (01/13 1419)   No acute cardiopulmonary process        Results from last 7 days   Lab Units 01/13/24  1354   SARS-COV-2  Negative     Results from last 7 days   Lab Units 01/14/24  0558 01/13/24 2028 01/13/24  1354 01/10/24  1847   WBC Thousand/uL 7.11  --  10.09 4.82   HEMOGLOBIN g/dL 8.7*  --  11.1* 11.2*   HEMATOCRIT % 27.5*  --  34.3* 34.9   PLATELETS Thousands/uL 225 248 241 279   NEUTROS ABS Thousands/µL 5.06  --  7.94* 2.67         Results from last 7 days   Lab Units 01/14/24  0710 01/13/24  1354 01/10/24  1847   SODIUM mmol/L 140 132* 135   POTASSIUM mmol/L 4.5 4.3 4.5   CHLORIDE mmol/L 107 96 98   CO2 mmol/L 25 25 24   ANION GAP mmol/L 8 11 13   BUN mg/dL 22 31* 25   CREATININE mg/dL 1.08 1.69* 1.70*   EGFR ml/min/1.73sq m 48 28 28   CALCIUM mg/dL 7.6* 8.0* 8.5   MAGNESIUM mg/dL 0.8*  --   --      Results from last 7 days   Lab Units 01/14/24  0710 01/13/24  1354   AST U/L 11* 15   ALT U/L 6* 8   ALK PHOS U/L 58 83   TOTAL PROTEIN g/dL 5.6* 7.1   ALBUMIN g/dL 3.1* 4.0   TOTAL BILIRUBIN mg/dL 0.44 0.47     Results from last 7 days   Lab Units 01/14/24  0733 01/14/24  0048 01/13/24  1804 01/13/24  1340   POC GLUCOSE mg/dl 118 148* 209* 203*     Results from last 7 days   Lab Units 01/14/24  0710 01/13/24  1354 01/10/24  1847   GLUCOSE RANDOM mg/dL 130 182* 114     Results from last 7 days   Lab Units 01/14/24  0422 01/13/24 2028 01/13/24  1559   OSMOLALITY, SERUM mmol/ 299* 287       Results from last 7 days   Lab Units 01/13/24  1816 01/13/24  1559 01/13/24  1354   HS TNI 0HR ng/L  --   --  15   HS TNI 2HR ng/L  --  20  --    HSTNI D2 ng/L  --  5  --    HS TNI 4HR ng/L 24  --   --    HSTNI D4 ng/L 9  --   --          Results from last 7 days   Lab Units 01/13/24  1354   PROTIME seconds 12.5   INR  0.88   PTT seconds 29         Results from last 7  days   Lab Units 01/13/24  1354   PROCALCITONIN ng/ml 0.08     Results from last 7 days   Lab Units 01/13/24  1354   LACTIC ACID mmol/L 1.1     Results from last 7 days   Lab Units 01/14/24  0422 01/13/24  2348 01/13/24  2028 01/13/24  1559   OSMOLALITY, SERUM mmol/  --  299* 287   OSMO UR mmol/KG  --  379  --   --      Results from last 7 days   Lab Units 01/13/24  2348 01/13/24  1406 01/10/24  1847   CLARITY UA   --  Clear Turbid   COLOR UA   --  Dark Orange Dark Orange   SPEC GRAV UA   --  1.015 1.016   PH UA   --  5.0 5.5   GLUCOSE UA mg/dl  --  Negative Negative   KETONES UA mg/dl  --  Negative Negative   BLOOD UA   --  Negative Negative   PROTEIN UA mg/dl  --  Negative Trace*   NITRITE UA   --  Negative Positive*   BILIRUBIN UA   --  Negative Small*   UROBILINOGEN UA (BE) mg/dl  --  <2.0 2.0*   LEUKOCYTES UA   --  Negative Negative   WBC UA /hpf  --   --  10-20*   RBC UA /hpf  --   --  None Seen   BACTERIA UA /hpf  --   --  Occasional   EPITHELIAL CELLS WET PREP /hpf  --   --  Moderate*   SODIUM UR  56  --   --      Results from last 7 days   Lab Units 01/13/24  1354   INFLUENZA A PCR  Negative   INFLUENZA B PCR  Negative   RSV PCR  Negative       Results from last 7 days   Lab Units 01/13/24  1438 01/13/24  1354 01/10/24  1847   BLOOD CULTURE  Received in Microbiology Lab. Culture in Progress. Received in Microbiology Lab. Culture in Progress.  --    URINE CULTURE   --   --  40,000-49,000 cfu/ml Enterococcus faecalis*  20,000-29,000 cfu/ml           Results from last 7 days   Lab Units 01/14/24  0558   VANCOMYCIN RM ug/mL 7.8*       ED Treatment:   Medication Administration from 01/13/2024 1321 to 01/14/2024 1009         Date/Time Order Dose Route Action     01/13/2024 1424 EST sodium chloride 0.9 % bolus 1,000 mL 1,000 mL Intravenous New Bag     01/13/2024 1408 EST fentanyl citrate (PF) 100 MCG/2ML 50 mcg 50 mcg Intravenous Given     01/14/2024 0901 EST nystatin (MYCOSTATIN) powder 1 Application  Topical Given     01/13/2024 2217 EST nystatin (MYCOSTATIN) powder -- Topical Given     01/13/2024 1424 EST ondansetron (ZOFRAN) injection 4 mg 4 mg Intravenous Given     01/13/2024 1602 EST fentanyl citrate (PF) 100 MCG/2ML 50 mcg 50 mcg Intravenous Given     01/13/2024 1602 EST metoclopramide (REGLAN) injection 10 mg 10 mg Intravenous Given     01/14/2024 0859 EST aspirin chewable tablet 81 mg 81 mg Oral Given     01/14/2024 0858 EST atorvastatin (LIPITOR) tablet 40 mg 40 mg Oral Given     01/14/2024 0900 EST clopidogrel (PLAVIX) tablet 75 mg 75 mg Oral Given     01/14/2024 0703 EST ferrous sulfate tablet 325 mg 325 mg Oral Given     01/14/2024 0703 EST levothyroxine tablet 37.5 mcg 37.5 mcg Oral Given     01/14/2024 0901 EST metoprolol succinate (TOPROL-XL) 24 hr tablet 50 mg 50 mg Oral Given     01/13/2024 2216 EST metoprolol succinate (TOPROL-XL) 24 hr tablet 50 mg 50 mg Oral Given     01/14/2024 0859 EST pantoprazole (PROTONIX) EC tablet 40 mg 40 mg Oral Given     01/13/2024 2328 EST ranolazine (RANEXA) 12 hr tablet 500 mg 500 mg Oral Given     01/13/2024 1810 EST multi-electrolyte (PLASMALYTE-A/ISOLYTE-S PH 7.4) IV solution 75 mL/hr Intravenous New Bag     01/13/2024 1802 EST multi-electrolyte (PLASMALYTE-A/ISOLYTE-S PH 7.4) IV solution 75 mL/hr Intravenous New Bag     01/14/2024 0618 EST heparin (porcine) subcutaneous injection 5,000 Units 5,000 Units Subcutaneous Given     01/13/2024 2216 EST heparin (porcine) subcutaneous injection 5,000 Units 5,000 Units Subcutaneous Given     01/13/2024 1801 EST acetaminophen (TYLENOL) tablet 975 mg 975 mg Oral Given     01/14/2024 0857 EST polyethylene glycol (MIRALAX) packet 17 g 17 g Oral Given     01/13/2024 2216 EST senna-docusate sodium (SENOKOT S) 8.6-50 mg per tablet 1 tablet 1 tablet Oral Given     01/14/2024 0901 EST lidocaine (LIDODERM) 5 % patch 1 patch 1 patch Topical Not Given     01/13/2024 2024 EST vancomycin (VANCOCIN) 1500 mg in sodium chloride 0.9% 250  mL IVPB 0 mg Intravenous Stopped     01/13/2024 1820 EST vancomycin (VANCOCIN) 1500 mg in sodium chloride 0.9% 250 mL IVPB -- Intravenous Rate/  Dose Change     01/13/2024 1819 EST vancomycin (VANCOCIN) 1500 mg in sodium chloride 0.9% 250 mL IVPB 1,500 mg Intravenous New Bag     01/13/2024 2020 EST ondansetron (ZOFRAN) injection 4 mg 4 mg Intravenous Given     01/14/2024 0855 EST magnesium sulfate 4 g/100 mL IVPB (premix) 4 g 4 g Intravenous New Bag          Past Medical History:   Diagnosis Date    Acid reflux     Anemia     hx of iron-deficient    Anxiety     Arthritis     Asthma     last needed inhaler last year 2020    Basal cell carcinoma     upper lip    Chronic narcotic dependence (HCC)     Chronic pain     Colon polyp     Cystocele     Diabetes mellitus (HCC)     stable    Disease of thyroid gland     hypothyroidism    Diverticulosis     Dizziness     at times    Dysfunctional uterine bleeding     last assessed - [id number]    Dysphagia     Fibromyalgia     Gastric ulcer     Gastroparesis     History of colonic polyps     last assessed - [id number]    History of gastroesophageal reflux (GERD)     Hypercholesterolemia     Hyperlipidemia     Hypertension     IBS (irritable bowel syndrome)     Pneumobilia 06/18/2022    Post laminectomy syndrome     S/P insertion of spinal cord stimulator 07/18/2018    Seasonal allergies     Shortness of breath     exertional    Spinal stenosis     Status post lumbar spinal fusion 03/16/2018    Stroke ([facility])     pt states slight stroke March 2022     Present on Admission:   Abnormal urinalysis   Acute kidney injury (HCC)   Hypothyroidism   Type 2 diabetes mellitus with other skin complications (HCC)   Iron deficiency anemia secondary to inadequate dietary iron intake   Cerebrovascular accident (CVA), unspecified mechanism (HCC)      Admitting Diagnosis: AMS (altered mental status) [R41.82]  Age/Sex: 78 y.o. female    Scheduled Medications:    acetaminophen, 975 mg, Oral, Q8H  ARY  aspirin, 81 mg, Oral, Daily  atorvastatin, 40 mg, Oral, Daily  clopidogrel, 75 mg, Oral, Daily  ferrous sulfate, 325 mg, Oral, Daily With Breakfast  heparin (porcine), 5,000 Units, Subcutaneous, Q8H ARY  insulin lispro, 1-6 Units, Subcutaneous, TID AC  insulin lispro, 1-6 Units, Subcutaneous, HS  levothyroxine, 37.5 mcg, Oral, Early Morning  lidocaine, 1 patch, Topical, Daily  magnesium sulfate, 4 g, Intravenous, Once  metoprolol succinate, 50 mg, Oral, Q12H ARY  Milnacipran HCl, 1 tablet, Oral, Daily  nystatin, , Topical, BID  pantoprazole, 40 mg, Oral, Daily  polyethylene glycol, 17 g, Oral, Daily  ranolazine, 500 mg, Oral, Q12H ARY  senna-docusate sodium, 1 tablet, Oral, HS  vancomycin, 1,000 mg, Intravenous, Q24H      Continuous IV Infusions:  multi-electrolyte, 75 mL/hr, Intravenous, Continuous      PRN Meds:  albuterol, 2 puff, Inhalation, Q6H PRN  baclofen, 10 mg, Oral, BID PRN  naloxone, 0.04 mg, Intravenous, Q1MIN PRN  ondansetron, 4 mg, Intravenous, Q6H PRN        IP CONSULT TO CASE MANAGEMENT  IP CONSULT TO PHARMACY    Network Utilization Review Department  ATTENTION: Please call with any questions or concerns to [phone] and carefully listen to the prompts so that you are directed to the right person. All voicemails are confidential.   For Discharge needs, contact Care Management DC Support Team at [phone] opt. 2  Send all requests for admission clinical reviews, approved or denied determinations and any other requests to dedicated fax number below belonging to the campus where the patient is receiving treatment. List of dedicated fax numbers for the Facilities:  FACILITY NAME UR FAX NUMBER   ADMISSION DENIALS (Administrative/Medical Necessity) [phone]   DISCHARGE SUPPORT TEAM (NETWORK) [phone]   PARENT CHILD HEALTH (Maternity/NICU/Pediatrics) [phone]   [facility] [phone]   [facility] [phone]   [facility]  [facility] [phone]   [facility] [phone]   [facility] [phone]   [facility] [phone]   [facility] [phone]   [facility] [phone]   [facility] [phone]   [facility] [phone]   [facility] [phone]

## 2024-01-14 NOTE — ED NOTES
Pt given perineal care, x1 wet diaper. Rolled and repositioned onto R side.      Sara Salomon RN  01/13/24 4718

## 2024-01-15 PROBLEM — E87.1 HYPONATREMIA: Status: RESOLVED | Noted: 2024-01-13 | Resolved: 2024-01-15

## 2024-01-15 LAB
ANION GAP SERPL CALCULATED.3IONS-SCNC: 5 MMOL/L
BUN SERPL-MCNC: 18 MG/DL (ref 5–25)
CALCIUM SERPL-MCNC: 8.2 MG/DL (ref 8.4–10.2)
CHLORIDE SERPL-SCNC: 108 MMOL/L (ref 96–108)
CO2 SERPL-SCNC: 27 MMOL/L (ref 21–32)
CREAT SERPL-MCNC: 0.97 MG/DL (ref 0.6–1.3)
ERYTHROCYTE [DISTWIDTH] IN BLOOD BY AUTOMATED COUNT: 15.1 % (ref 11.6–15.1)
GFR SERPL CREATININE-BSD FRML MDRD: 55 ML/MIN/1.73SQ M
GLUCOSE SERPL-MCNC: 122 MG/DL (ref 65–140)
GLUCOSE SERPL-MCNC: 128 MG/DL (ref 65–140)
GLUCOSE SERPL-MCNC: 144 MG/DL (ref 65–140)
GLUCOSE SERPL-MCNC: 184 MG/DL (ref 65–140)
GLUCOSE SERPL-MCNC: 206 MG/DL (ref 65–140)
HCT VFR BLD AUTO: 26.9 % (ref 34.8–46.1)
HGB BLD-MCNC: 8.1 G/DL (ref 11.5–15.4)
MAGNESIUM SERPL-MCNC: 1.7 MG/DL (ref 1.9–2.7)
MCH RBC QN AUTO: 29.8 PG (ref 26.8–34.3)
MCHC RBC AUTO-ENTMCNC: 30.1 G/DL (ref 31.4–37.4)
MCV RBC AUTO: 99 FL (ref 82–98)
PLATELET # BLD AUTO: 224 THOUSANDS/UL (ref 149–390)
PMV BLD AUTO: 10.7 FL (ref 8.9–12.7)
POTASSIUM SERPL-SCNC: 4.4 MMOL/L (ref 3.5–5.3)
RBC # BLD AUTO: 2.72 MILLION/UL (ref 3.81–5.12)
SODIUM SERPL-SCNC: 140 MMOL/L (ref 135–147)
WBC # BLD AUTO: 5.64 THOUSAND/UL (ref 4.31–10.16)

## 2024-01-15 PROCEDURE — 83735 ASSAY OF MAGNESIUM: CPT

## 2024-01-15 PROCEDURE — 97163 PT EVAL HIGH COMPLEX 45 MIN: CPT

## 2024-01-15 PROCEDURE — 97167 OT EVAL HIGH COMPLEX 60 MIN: CPT

## 2024-01-15 PROCEDURE — 85027 COMPLETE CBC AUTOMATED: CPT

## 2024-01-15 PROCEDURE — 99232 SBSQ HOSP IP/OBS MODERATE 35: CPT | Performed by: INTERNAL MEDICINE

## 2024-01-15 PROCEDURE — 82948 REAGENT STRIP/BLOOD GLUCOSE: CPT

## 2024-01-15 PROCEDURE — 80048 BASIC METABOLIC PNL TOTAL CA: CPT

## 2024-01-15 RX ORDER — OXYCODONE HYDROCHLORIDE AND ACETAMINOPHEN 5; 325 MG/1; MG/1
1 TABLET ORAL EVERY 4 HOURS PRN
Status: DISCONTINUED | OUTPATIENT
Start: 2024-01-15 | End: 2024-01-17 | Stop reason: HOSPADM

## 2024-01-15 RX ORDER — MAGNESIUM SULFATE HEPTAHYDRATE 40 MG/ML
2 INJECTION, SOLUTION INTRAVENOUS ONCE
Qty: 50 ML | Refills: 0 | Status: COMPLETED | OUTPATIENT
Start: 2024-01-15 | End: 2024-01-15

## 2024-01-15 RX ORDER — ACETAMINOPHEN 325 MG/1
650 TABLET ORAL EVERY 8 HOURS SCHEDULED
Status: DISCONTINUED | OUTPATIENT
Start: 2024-01-15 | End: 2024-01-17 | Stop reason: HOSPADM

## 2024-01-15 RX ORDER — ECHINACEA PURPUREA EXTRACT 125 MG
1 TABLET ORAL
Status: DISCONTINUED | OUTPATIENT
Start: 2024-01-15 | End: 2024-01-17 | Stop reason: HOSPADM

## 2024-01-15 RX ADMIN — RANOLAZINE 500 MG: 500 TABLET, FILM COATED, EXTENDED RELEASE ORAL at 12:08

## 2024-01-15 RX ADMIN — HEPARIN SODIUM 5000 UNITS: 5000 INJECTION INTRAVENOUS; SUBCUTANEOUS at 14:42

## 2024-01-15 RX ADMIN — ASPIRIN 81 MG CHEWABLE TABLET 81 MG: 81 TABLET CHEWABLE at 08:45

## 2024-01-15 RX ADMIN — ACETAMINOPHEN 650 MG: 325 TABLET, FILM COATED ORAL at 21:24

## 2024-01-15 RX ADMIN — CLOPIDOGREL BISULFATE 75 MG: 75 TABLET ORAL at 08:45

## 2024-01-15 RX ADMIN — METOPROLOL SUCCINATE 50 MG: 50 TABLET, EXTENDED RELEASE ORAL at 21:24

## 2024-01-15 RX ADMIN — ACETAMINOPHEN 975 MG: 325 TABLET, FILM COATED ORAL at 05:41

## 2024-01-15 RX ADMIN — SENNOSIDES, DOCUSATE SODIUM 1 TABLET: 8.6; 5 TABLET ORAL at 21:24

## 2024-01-15 RX ADMIN — MAGNESIUM SULFATE HEPTAHYDRATE 2 G: 40 INJECTION, SOLUTION INTRAVENOUS at 08:50

## 2024-01-15 RX ADMIN — NYSTATIN: 100000 POWDER TOPICAL at 08:47

## 2024-01-15 RX ADMIN — PANTOPRAZOLE SODIUM 40 MG: 40 TABLET, DELAYED RELEASE ORAL at 08:49

## 2024-01-15 RX ADMIN — RANOLAZINE 500 MG: 500 TABLET, FILM COATED, EXTENDED RELEASE ORAL at 21:24

## 2024-01-15 RX ADMIN — NYSTATIN: 100000 POWDER TOPICAL at 16:55

## 2024-01-15 RX ADMIN — AMLODIPINE BESYLATE 5 MG: 5 TABLET ORAL at 08:46

## 2024-01-15 RX ADMIN — ATORVASTATIN CALCIUM 40 MG: 40 TABLET, FILM COATED ORAL at 08:45

## 2024-01-15 RX ADMIN — INSULIN LISPRO 1 UNITS: 100 INJECTION, SOLUTION INTRAVENOUS; SUBCUTANEOUS at 16:54

## 2024-01-15 RX ADMIN — HEPARIN SODIUM 5000 UNITS: 5000 INJECTION INTRAVENOUS; SUBCUTANEOUS at 05:41

## 2024-01-15 RX ADMIN — HEPARIN SODIUM 5000 UNITS: 5000 INJECTION INTRAVENOUS; SUBCUTANEOUS at 21:24

## 2024-01-15 RX ADMIN — OXYCODONE HYDROCHLORIDE AND ACETAMINOPHEN 1 TABLET: 5; 325 TABLET ORAL at 13:15

## 2024-01-15 RX ADMIN — LIDOCAINE 5% 1 PATCH: 700 PATCH TOPICAL at 08:45

## 2024-01-15 RX ADMIN — MILNACIPRAN HYDROCHLORIDE 100 MG: 100 TABLET, FILM COATED ORAL at 08:46

## 2024-01-15 RX ADMIN — FERROUS SULFATE TAB 325 MG (65 MG ELEMENTAL FE) 325 MG: 325 (65 FE) TAB at 08:49

## 2024-01-15 RX ADMIN — VANCOMYCIN HYDROCHLORIDE 1000 MG: 1 INJECTION, SOLUTION INTRAVENOUS at 08:50

## 2024-01-15 RX ADMIN — POLYETHYLENE GLYCOL 3350 17 G: 17 POWDER, FOR SOLUTION ORAL at 08:45

## 2024-01-15 RX ADMIN — ACETAMINOPHEN 650 MG: 325 TABLET, FILM COATED ORAL at 14:42

## 2024-01-15 RX ADMIN — INSULIN LISPRO 2 UNITS: 100 INJECTION, SOLUTION INTRAVENOUS; SUBCUTANEOUS at 12:08

## 2024-01-15 RX ADMIN — METOPROLOL SUCCINATE 50 MG: 50 TABLET, EXTENDED RELEASE ORAL at 08:45

## 2024-01-15 RX ADMIN — LEVOTHYROXINE SODIUM 37.5 MCG: 75 TABLET ORAL at 05:41

## 2024-01-15 NOTE — MALNUTRITION/BMI
This medical record reflects one or more clinical indicators suggestive of malnutrition.    Malnutrition Findings:   Adult Malnutrition type: Chronic illness  Adult Degree of Malnutrition: Malnutrition of moderate degree  Malnutrition Characteristics: Weight loss, Inadequate energy                  360 Statement: Chronic/moderate malnutrition r/t condition, as evidenced by intake meeting <75% estimated needs > 1 month, and 8% wt loss x 2 months (1/15/24: 157#, 11/26/23: 171#). Treatment: PO diet, pt reporting improvement in appetite over past couple days and doesn't care for oral nutrition supplements       Body mass index is 30.78 kg/m².     See Nutrition note dated 1/15/2024 for additional details.  Completed nutrition assessment is viewable in the nutrition documentation.

## 2024-01-15 NOTE — PLAN OF CARE
Problem: Potential for Falls  Goal: Patient will remain free of falls  Description: INTERVENTIONS:  - Educate patient/family on patient safety including physical limitations  - Instruct patient to call for assistance with activity   - Consult OT/PT to assist with strengthening/mobility   - Keep Call bell within reach  - Keep bed low and locked with side rails adjusted as appropriate  - Keep care items and personal belongings within reach  - Initiate and maintain comfort rounds  - Make Fall Risk Sign visible to staff  - Offer Toileting every 2 Hours, in advance of need  - Initiate/Maintain alarm  - Obtain necessary fall risk management equipment:   - Apply yellow socks and bracelet for high fall risk patients  - Consider moving patient to room near nurses station  Outcome: Progressing     Problem: Prexisting or High Potential for Compromised Skin Integrity  Goal: Skin integrity is maintained or improved  Description: INTERVENTIONS:  - Identify patients at risk for skin breakdown  - Assess and monitor skin integrity  - Assess and monitor nutrition and hydration status  - Monitor labs   - Assess for incontinence   - Turn and reposition patient  - Assist with mobility/ambulation  - Relieve pressure over bony prominences  - Avoid friction and shearing  - Provide appropriate hygiene as needed including keeping skin clean and dry  - Evaluate need for skin moisturizer/barrier cream  - Collaborate with interdisciplinary team   - Patient/family teaching  - Consider wound care consult   Outcome: Progressing     Problem: PAIN - ADULT  Goal: Verbalizes/displays adequate comfort level or baseline comfort level  Description: Interventions:  - Encourage patient to monitor pain and request assistance  - Assess pain using appropriate pain scale  - Administer analgesics based on type and severity of pain and evaluate response  - Implement non-pharmacological measures as appropriate and evaluate response  - Consider cultural and  social influences on pain and pain management  - Notify physician/advanced practitioner if interventions unsuccessful or patient reports new pain  Outcome: Progressing     Problem: INFECTION - ADULT  Goal: Absence or prevention of progression during hospitalization  Description: INTERVENTIONS:  - Assess and monitor for signs and symptoms of infection  - Monitor lab/diagnostic results  - Monitor all insertion sites, i.e. indwelling lines, tubes, and drains  - Monitor endotracheal if appropriate and nasal secretions for changes in amount and color  - [place] appropriate cooling/warming therapies per order  - Administer medications as ordered  - Instruct and encourage patient and family to use good hand hygiene technique  - Identify and instruct in appropriate isolation precautions for identified infection/condition  Outcome: Progressing  Goal: Absence of fever/infection during neutropenic period  Description: INTERVENTIONS:  - Monitor WBC    Outcome: Progressing     Problem: SAFETY ADULT  Goal: Patient will remain free of falls  Description: INTERVENTIONS:  - Educate patient/family on patient safety including physical limitations  - Instruct patient to call for assistance with activity   - Consult OT/PT to assist with strengthening/mobility   - Keep Call bell within reach  - Keep bed low and locked with side rails adjusted as appropriate  - Keep care items and personal belongings within reach  - Initiate and maintain comfort rounds  - Make Fall Risk Sign visible to staff  - Offer Toileting every 2 Hours, in advance of need  - Initiate/Maintain alarm  - Obtain necessary fall risk management equipment:   - Apply yellow socks and bracelet for high fall risk patients  - Consider moving patient to room near nurses station  Outcome: Progressing  Goal: Maintain or return to baseline ADL function  Description: INTERVENTIONS:  -  Assess patient's ability to carry out ADLs; assess patient's baseline for ADL function and  identify physical deficits which impact ability to perform ADLs (bathing, care of mouth/teeth, toileting, grooming, dressing, etc.)  - Assess/evaluate cause of self-care deficits   - Assess range of motion  - Assess patient's mobility; develop plan if impaired  - Assess patient's need for assistive devices and provide as appropriate  - Encourage maximum independence but intervene and supervise when necessary  - Involve family in performance of ADLs  - Assess for home care needs following discharge   - Consider OT consult to assist with ADL evaluation and planning for discharge  - Provide patient education as appropriate  Outcome: Progressing  Goal: Maintains/Returns to pre admission functional level  Description: INTERVENTIONS:  - Perform AM-PAC 6 Click Basic Mobility/ Daily Activity assessment daily.  - Set and communicate daily mobility goal to care team and patient/family/caregiver.   - Collaborate with rehabilitation services on mobility goals if consulted  - Perform Range of Motion 2 times a day.  - Reposition patient every 2 hours.  - Dangle patient 2 times a day  - Stand patient 2 times a day  - Ambulate patient 2 times a day  - Out of bed to chair 2 times a day   - Out of bed for meals 2 times a day  - Out of bed for toileting  - Record patient progress and toleration of activity level   Outcome: Progressing     Problem: DISCHARGE PLANNING  Goal: Discharge to home or other facility with appropriate resources  Description: INTERVENTIONS:  - Identify barriers to discharge w/patient and caregiver  - Arrange for needed discharge resources and transportation as appropriate  - Identify discharge learning needs (meds, wound care, etc.)  - Arrange for interpretive services to assist at discharge as needed  - Refer to Case Management Department for coordinating discharge planning if the patient needs post-hospital services based on physician/advanced practitioner order or complex needs related to functional status,  cognitive ability, or social support system  Outcome: Progressing     Problem: Knowledge Deficit  Goal: Patient/family/caregiver demonstrates understanding of disease process, treatment plan, medications, and discharge instructions  Description: Complete learning assessment and assess knowledge base.  Interventions:  - Provide teaching at level of understanding  - Provide teaching via preferred learning methods  Outcome: Progressing     Problem: Nutrition/Hydration-ADULT  Goal: Nutrient/Hydration intake appropriate for improving, restoring or maintaining nutritional needs  Description: Monitor and assess patient's nutrition/hydration status for malnutrition. Collaborate with interdisciplinary team and initiate plan and interventions as ordered.  Monitor patient's weight and dietary intake as ordered or per policy. Utilize nutrition screening tool and intervene as necessary. Determine patient's food preferences and provide high-protein, high-caloric foods as appropriate.     INTERVENTIONS:  - Monitor oral intake, urinary output, labs, and treatment plans  - Assess nutrition and hydration status and recommend course of action  - Evaluate amount of meals eaten  - Assist patient with eating if necessary   - Allow adequate time for meals  - Recommend/ encourage appropriate diets, oral nutritional supplements, and vitamin/mineral supplements  - Order, calculate, and assess calorie counts as needed  - Recommend, monitor, and adjust tube feedings and TPN/PPN based on assessed needs  - Assess need for intravenous fluids  - Provide specific nutrition/hydration education as appropriate  - Include patient/family/caregiver in decisions related to nutrition  Outcome: Progressing

## 2024-01-15 NOTE — DISCHARGE SUMMARY
[facility]  Discharge- Mattie Varela 1945, 78 y.o. female MRN: [id number]  Unit/Bed#: S -01 Encounter: [id number]  Primary Care Provider: NANY Pollock   Date and time admitted to hospital: 1/13/2024  1:22 PM    * AMS (altered mental status)  Assessment & Plan  Patient with altered mental status since evening of 1/12  In context of UTI  Patient's baseline allows for a normal conversation with attention and thought process per family  Denies recent injury, absent of focal neurologic defect  Complicated by likely medical noncompliance in recent days  Possible urinary retention  Significant back pain reported 10/10 - unclear contribution of chronic back pain vs acute flank pain    Patient is high risk of delirium  Acute encephalopathy resolved    Plan:  Resolved  Patient likely with baseline dementia  Discharging to [facility]    Abnormal urinalysis  Assessment & Plan  Patient with abnormal urinalysis growing Enterococcus faecalis on 1/10  History of recurrent UTI with drug-resistant organisms  Was not treated on 1/10 given MDRO history pending sensitivities  Reports dysuria and flank pain  Urinalysis 1/13 clean                                                    Ampicillin                 <=2.00 ug/ml   Susceptible    Levofloxacin   <=1.00 ug/ml Susceptible    Nitrofurantoin  <=32 ug/ml Susceptible    Tetracycline  >8 ug/ml Resistant    Vancomycin   1.00 ug/ml Susceptible        Patient is allergic to ampicillin and cephalosporins    Plan:  Patient received 3 days of vancomycin, given insufficient colony formation number, will discontinue vancomycin  1 day Pyridium 1/16  Resolved    Encounter for examination for admission to nursing home  Assessment & Plan  Currently lives with disabled son  Family reports medical noncompliance  Patient minimally ambulatory only able to transfer to bathroom  Significant medical need, unable to provide for self  Family report that they feel unable  to provide patient with necessary care at home any longer, request placement after this admission    Plan:  Discouraged to skilled nursing facility    Moderate protein-calorie malnutrition (HCC)  Assessment & Plan  Malnutrition Findings:   Adult Malnutrition type: Chronic illness  Adult Degree of Malnutrition: Malnutrition of moderate degree  Malnutrition Characteristics: Weight loss, Inadequate energy                  360 Statement: Chronic/moderate malnutrition r/t condition, as evidenced by intake meeting <75% estimated needs > 1 month, and 8% wt loss x 2 months (1/15/24: 157#, 11/26/23: 171#). Treatment: PO diet, pt reporting improvement in appetite over past couple days and doesn't care for oral nutrition supplements    BMI Findings:           Body mass index is 31.13 kg/m².       Hypertensive urgency  Assessment & Plan  Blood Pressure: 134/63  Likely in setting of medical noncompliance    Plan:  Continue home metoprolol succinate 50 Mg twice daily    Cerebrovascular accident (CVA), unspecified mechanism (HCC)  Assessment & Plan  History of CVA in addition to CAD    Plan:  Continue home Plavix and statin    Hypothyroidism  Assessment & Plan  Lab Results   Component Value Date    GOP3OYAEEDTT 3.899 11/26/2023     Continue home levothyroxine 37.5 mcg    Iron deficiency anemia secondary to inadequate dietary iron intake  Assessment & Plan  Recent Labs     01/15/24  0513 01/16/24  0445 01/17/24  0529   HGB 8.1* 8.4* 8.2*       Continue home ferrous sulfate 325 mg daily    Type 2 diabetes mellitus with other skin complications (HCC)  Assessment & Plan  Lab Results   Component Value Date    HGBA1C 6.5 (H) 11/26/2023       Recent Labs     01/16/24  1126 01/16/24  1630 01/16/24 2026 01/17/24  0754   POCGLU 215* 134 117 115         Blood Sugar Average: Last 72 hrs:  (P) 163.7677038894677128    Okay to restart home regimen        Medical Problems       Resolved Problems  Date Reviewed: 1/16/2024            Resolved     Acute kidney injury (HCC) 1/16/2024     Resolved by  Zak Jones MD    Hyponatremia 1/15/2024     Resolved by  Alejandro Philip MD        Discharging Resident: Alejandro Philip MD  Discharging Attending: No att. providers found  PCP: NANY Pollock  Admission Date:   Admission Orders (From admission, onward)       Ordered        01/14/24 1003  Inpatient Admission  Once            01/13/24 1600  Place in Observation  Once                          Discharge Date: 01/17/24    Consultations During Hospital Stay:  Case management  Pharmacy    Procedures Performed:   None    Significant Findings / Test Results:   Urinalysis on 1/10 growing Enterococcus faecalis, sensitive to vancomycin.  Unable to use ampicillin or cephalosporins due to patient allergy.    Incidental Findings:   None    Test Results Pending at Discharge (will require follow up):  None     Outpatient Tests Requested:  None    Complications: None    Reason for Admission: AMS secondary to UTI    Hospital Course:   Mattie Varela is a 78 y.o. female patient who originally presented to the hospital on 1/13/2024 due to worsening encephalopathy.  As per patient's daughter she has had multiple episodes of dehydration and noncompliance with medications resulting in multiple hospitalizations.  During evaluation in the ED patient was found to have possible UTI, urine sample taken 2 days prior to admission returned positive for Enterococcus with susceptibility to vancomycin, unable to switch patient over to oral as allergic to penicillins and cephalosporins.  Patient was placed on IV vancomycin for course of 3 days, with improvement of mental status and orientation.  Patient was also given IV hydration for noted hyponatremia and SADAF, with resolution during course of hospitalization.    On 1/16 it was determined that the patient's previous urine culture was growing insufficient quantity of colonizing units to justify continued IV antibiotics.   Vancomycin was discontinued.  Patient's mentation was much improved, and rehab was found.    Prior to discharge patient stated that she had some back pain, obtain lumbar x-ray, independent impression without any changes in patient's hardware or acute fractures.  Final read in agreement, no acute abnormalities.    Patient will be discharged to SNF with new prescription for amlodipine and change dosage of levothyroxine.        Please see above list of diagnoses and related plan for additional information.     Condition at Discharge: stable    Discharge Day Visit / Exam:   Subjective: Patient at bedside this morning, patient was sitting on edge of bed.  Stated that she was comfortable today, and only had some mild lower back pain.  Stated that this would be aided with her usual pain regimen.  Denied any lightheadedness, dizziness, confusion, chest pains, shortness of breath, abdominal pains, dysuria.  Did state that she had some frequency but denies any other symptoms of urinary infection.  Remained afebrile and mostly oriented x 3.  Vitals: Blood Pressure: 134/63 (01/17/24 0728)  Pulse: 76 (01/17/24 0728)  Temperature: 98.4 °F (36.9 °C) (01/17/24 0728)  Temp Source: Oral (01/17/24 0728)  Respirations: 18 (01/17/24 0728)  Height: 5' (152.4 cm) (01/13/24 2300)  Weight - Scale: 72.3 kg (159 lb 6.4 oz) (01/17/24 0535)  SpO2: 94 % (01/17/24 0728)  Exam:   Physical Exam  Vitals and nursing note reviewed.   Constitutional:       General: She is not in acute distress.     Appearance: She is well-developed. She is not toxic-appearing.   HENT:      Head: Normocephalic and atraumatic.   Eyes:      Conjunctiva/sclera: Conjunctivae normal.   Cardiovascular:      Rate and Rhythm: Normal rate. Rhythm irregular.      Heart sounds: Murmur heard.   Pulmonary:      Effort: Pulmonary effort is normal. No respiratory distress.      Breath sounds: Normal breath sounds.   Abdominal:      Palpations: Abdomen is soft.      Tenderness: There  is no abdominal tenderness.   Musculoskeletal:      Cervical back: Neck supple.      Right lower leg: Edema present.      Left lower leg: Edema present.   Skin:     General: Skin is warm and dry.      Capillary Refill: Capillary refill takes less than 2 seconds.   Neurological:      Mental Status: She is alert and oriented to person, place, and time.      Comments: Patient tends to wax and wane, will oriented today.  Was aware of impending discharge and was very pleasant to speak with.   Psychiatric:         Mood and Affect: Mood normal.         Discussion with Family: Updated  (daughter) via phone.    Discharge instructions/Information to patient and family:   See after visit summary for information provided to patient and family.      Provisions for Follow-Up Care:  See after visit summary for information related to follow-up care and any pertinent home health orders.      Mobility at time of Discharge:   Basic Mobility Inpatient Raw Score: 13  JH-HLM Goal: 4: Move to chair/commode  JH-HLM Achieved: 3: Sit at edge of bed  HLM Goal achieved. Continue to encourage appropriate mobility.     Disposition:   Other Skilled Nursing Facility at [facility]    Planned Readmission: No    Discharge Medications:  See after visit summary for reconciled discharge medications provided to patient and/or family.      **Please Note: This note may have been constructed using a voice recognition system**

## 2024-01-15 NOTE — CASE MANAGEMENT
Case Management Assessment & Discharge Planning Note    Patient name Mattie Varela  Location S /S -01 MRN [id number]  : 1945 Date 1/15/2024       Current Admission Date: 2024  Current Admission Diagnosis:AMS (altered mental status)   Patient Active Problem List    Diagnosis Date Noted    Hypertensive urgency 2024    Hyponatremia 2024    AMS (altered mental status) 2024    Encounter for examination for admission to nursing home 2024    Stage 3a chronic kidney disease (HCC) 01/10/2024    Left ventricular outflow tract obstruction 2024    Obesity, Class I, BMI 30-34.9 2024    Urinary retention 2023    SADAF (acute kidney injury) (HCC) 2023    Tremor 2023    Exertional shortness of breath 2023    Non obstructive CAD 11/15/2023    History of lumbar surgery 11/10/2023    Abnormal urinalysis 2023    Chronic obstructive pulmonary disease, unspecified COPD type (HCC) 2023    Confusion with body shakes and blank stare 2023    Normocytic anemia 2023    Bilateral lower extremity edema 2023    Cerebrovascular accident (CVA), unspecified mechanism (HCC) 2022    Acute kidney injury (HCC) 2022    Stroke-like symptoms 2022    Prepyloric ulcer 2021    Diarrhea 2021    Mild intermittent asthma without complication 2020    S/P insertion of spinal cord stimulator 2018    Iron deficiency anemia secondary to inadequate dietary iron intake 2018    Type 2 diabetes mellitus with other skin complications ([facility])     Failed back surgical syndrome 2018    Hypothyroidism 2017    Degenerative lumbar spinal stenosis 2017    Glaucoma 2017    Overactive bladder 2016    Dyspepsia 2016    Hypercholesterolemia 2016    Anxiety 2015    Chronic pain disorder 2013    Hypertension 2013      LOS (days): 1  Geometric Mean LOS (GMLOS)  (days):   Days to GMLOS:     OBJECTIVE:    Risk of Unplanned Readmission Score: 31.42         Current admission status: Inpatient       Preferred Pharmacy:   [id number]  [place] - [address]  [address]  [place] [address]  Phone: [phone] Fax: [phone]    [id number]  [place] - [address] [place] [address]  Phone: [phone] Fax: [phone]    [id number] [place] - [address] [facility] [place] [address]  Phone: [phone] Fax: [phone]    [id number] [place] - [address]  [address] [facility] [place] [address]  Phone: [phone] Fax: [phone]    [facility] [place] [place] - [address]  [address]  [address] [place] [address]  Phone: [phone] Fax: [phone]    Primary Care Provider: NANY Pollock    Primary Insurance: [employer] [facility]  Secondary Insurance:     ASSESSMENT:  Active Health Care Proxies       Venice Baires [facility] Representative - Daughter   Primary Phone: [phone] (Mobile)  Home Phone: [phone]                                Patient Information  Admitted from:: Home  Mental Status: Other (Comment) (spoke with daughter, Dr. Baires, by phone)  Assessment information provided by:: Daughter  Primary Caregiver: Family  Support Systems: Self, Family members  County of Residence: [place]  What city do you live in?: [place]  Home entry access options. Select all that apply.: No steps to enter home  Type of Current Residence: Apartment  Living Arrangements: Lives w/ Son    Activities of Daily Living Prior to Admission  Functional Status: Assistance  Completes ADLs independently?: No  Level of ADL dependence: Assistance  Ambulates independently?: No  Level of ambulatory dependence: Assistance  Does the patient have a history of Short-Term Rehab?:  Yes (recently at [facility])         Patient Information Continued  Does patient have prescription coverage?: Yes                Housing Stability: Low Risk  (1/15/2024)    Housing Stability Vital Sign     Unable to Pay for Housing in the Last Year: No     Number of Places Lived in the Last Year: 1     Unstable Housing in the Last Year: No   Food Insecurity: No Food Insecurity (1/15/2024)    Hunger Vital Sign     Worried About Running Out of Food in the Last Year: Never true     Ran Out of Food in the Last Year: Never true   Transportation Needs: No Transportation Needs (1/15/2024)    PRAPARE - Transportation     Lack of Transportation (Medical): No     Lack of Transportation (Non-Medical): No   Utilities: Not At Risk (1/15/2024)    [facility] Utilities     Threatened with loss of utilities: No       DISCHARGE DETAILS:    Discharge planning discussed with:: patient's daughter, Dr. Venice Baires, by phone  Freedom of Choice: Yes (re: facility placement)  Comments - Freedom of Choice: does not want to consider [facility] again; relays preference [facility] if possible, however aware that [employer] insurance may prevent that; blanket referrals sent - awaiting PT/OT  CM contacted family/caregiver?: Yes  Were Treatment Team discharge recommendations reviewed with patient/caregiver?: Yes  Did patient/caregiver verbalize understanding of patient care needs?: N/A- going to facility  Were patient/caregiver advised of the risks associated with not following Treatment Team discharge recommendations?: Yes    Contacts  Patient Contacts: daughter, Dr. Venice Baires  Relationship to Patient:: Family  Contact Method: Phone  Phone Number: [phone]  Reason/Outcome: Continuity of Care, Emergency Contact, Referral, Discharge Planning              Other Referral/Resources/Interventions Provided:  Interventions: SNF  Referral Comments: TT received from  relaying daughter, Dr. Venice Baires, requesting  call from CM. Call made to daughter to complete assessent and discuss d/c plan. Per daughter, patient lives at home with her son (daughter's brother) who is disabled. Reports patient had been to [facility] over the holidays but does not want to go back to that facility. Relays that she feels her mom may need long term care, and preference would be for patient to go to [facility]. Relayed that [facility] is only contracted with two facilities in this area - [facility] and [facility], so may be difficult to get a bed at an alternative facility. Daughter reports she's been in touch with Chen Guthrie ([phone]) at [facility] to relay that they no longer want to be under their services, but states that insurance won't change back to traditional Medicare until next month. Aware that this writer will follow-up with [facility] re: same. PT/OT evaluations pending for this patient. Will send blanket referrals as LTC being requested. Will continue to follow.    Would you like to participate in our [facility] service program?  : No - Declined    Treatment Team Recommendation: SNF  Discharge Destination Plan:: SNF

## 2024-01-15 NOTE — PLAN OF CARE
Problem: PHYSICAL THERAPY ADULT  Goal: Performs mobility at highest level of function for planned discharge setting.  See evaluation for individualized goals.  Description: Treatment/Interventions: Functional transfer training, LE strengthening/ROM, Therapeutic exercise, Cognitive reorientation, Patient/family training, Equipment eval/education, Bed mobility, Gait training, Spoke to nursing, Spoke to case management, OT  Equipment Recommended: Walker       See flowsheet documentation for full assessment, interventions and recommendations.  1/15/2024 1538 by Naveen Mesa PT  Note: Prognosis: Fair  Problem List: Decreased strength, Decreased endurance, Impaired balance, Decreased mobility, Decreased cognition, Impaired judgement, Obesity  Assessment: Pt is a 78 y.o. female seen for PT evaluation s/p admit to [facility] on 1/13/2024. Pt was admitted with a primary dx of: AMS.  PT now consulted for assessment of mobility and d/c needs. Pt with Ambulate orders.  Pts current comorbidities and personal factors effecting treatment include: BMI, chronic LBP, DM II, glaucoma, CKD, HLD, HTN, chronic pain syndrome. Pts current clinical presentation is Unstable/Unpredictable (high complexity) due to Ongoing medical management for primary dx, Decreased activity tolerance compared to baseline, Fall risk, Increased assistance needed from caregiver at current time, Ongoing telemetry monitoring, Cog status. Prior to admission, pt was independent with use of RW. Upon evaluation, pt currently is requiring ; [place] for transfers and ModA for ambulation 5 ft w/ RW. Pt presents at PT eval functioning below baseline and currently w/ overall mobility deficits 2* to: BLE weakness, impaired balance, gait deviations, pain, decreased activity tolerance compared to baseline, decreased functional mobility tolerance compared to baseline, impaired judgement, fall risk, decreased cognition. Pt currently at a fall risk 2* to  impairments listed above.  Pt will continue to benefit from skilled acute PT interventions to address stated impairments; to maximize functional mobility; for ongoing pt/ family training; and DME needs. At conclusion of PT session chair alarm engaged, all needs in reach, RN notified of session findings/recommendations, and pt returned back in recliner chair with phone and call bell within reach. Pt denies any further questions at this time. Recommend Level II (Moderate Resource Intensity)  upon hospital D/C.        Rehab Resource Intensity Level, PT: II (Moderate Resource Intensity)    See flowsheet documentation for full assessment.     1/15/2024 1537 by Naveen Mesa PT  Note: Prognosis: Fair  Problem List: Decreased strength, Decreased endurance, Impaired balance, Decreased mobility, Decreased cognition, Impaired judgement, Obesity  Assessment: Pt is a 78 y.o. female seen for PT evaluation s/p admit to [facility] on 1/13/2024. Pt was admitted with a primary dx of: AMS.  PT now consulted for assessment of mobility and d/c needs. Pt with Ambulate orders.  Pts current comorbidities and personal factors effecting treatment include: BMI, chronic LBP, DM II, glaucoma, CKD, HLD, HTN, chronic pain syndrome. Pts current clinical presentation is Unstable/Unpredictable (high complexity) due to Ongoing medical management for primary dx, Decreased activity tolerance compared to baseline, Fall risk, Increased assistance needed from caregiver at current time, Ongoing telemetry monitoring, Cog status. Prior to admission, pt was independent with use of RW. Upon evaluation, pt currently is requiring ; [place] for transfers and ModA for ambulation 5 ft w/ RW. Pt presents at PT eval functioning below baseline and currently w/ overall mobility deficits 2* to: BLE weakness, impaired balance, gait deviations, pain, decreased activity tolerance compared to baseline, decreased functional mobility tolerance compared to  baseline, impaired judgement, fall risk, decreased cognition. Pt currently at a fall risk 2* to impairments listed above.  Pt will continue to benefit from skilled acute PT interventions to address stated impairments; to maximize functional mobility; for ongoing pt/ family training; and DME needs. At conclusion of PT session chair alarm engaged, all needs in reach, RN notified of session findings/recommendations, and pt returned back in recliner chair with phone and call bell within reach. Pt denies any further questions at this time. Recommend Level II (Moderate Resource Intensity)  upon hospital D/C.        Rehab Resource Intensity Level, PT: II (Moderate Resource Intensity)    See flowsheet documentation for full assessment.

## 2024-01-15 NOTE — ASSESSMENT & PLAN NOTE
Patient with altered mental status since evening of 1/12  In context of UTI  Patient's baseline allows for a normal conversation with attention and thought process per family  Denies recent injury, absent of focal neurologic defect  Complicated by likely medical noncompliance in recent days  Possible urinary retention  Significant back pain reported 10/10 - unclear contribution of chronic back pain vs acute flank pain    Patient is high risk of delirium  Acute encephalopathy resolved    Plan:  Initiate delirium precautions  Maintain normal sleep/wake cycle  Minimize overnight interruptions, group overnight vitals/labs/nursing checks as possible  Dim lights, close blinds and turn off tv to minimize stimulation and encourage sleep environment in evenings  Ensure that pain is well controlled; Tylenol 975mg Q8H scheduled  Monitor for fecal and urinary retention which may precipitate delirium, initiated to urinary retention protocol  Encourage early mobilization and ambulation  Provide frequent reorientation and redirection  Encourage family and friends at the bedside to help help calm patient if anxious  Avoid medications which may precipitate or worsen delirium such as tramadol, benzodiazepine, anticholinergics, and benadryl  Encourage hydration and nutrition   Redirect unwanted behaviors as first line, avoid physical restraints, use chemical restraint only if all other attempts have been Unsuccessful, would consider Zyprexa 2.5mg IM Q, monitor for orthostatic hypotension and QTc prolongation with repeat dosing, recommend lowest dose possible for shortest duration possible       Treatment for UTI as noted under abnormal urinalysis  Continued patient's home meds with the exception of opiates and muscarinic antagonist  No need for head imaging at this time given low suspicion for intracranial abnormality

## 2024-01-15 NOTE — OCCUPATIONAL THERAPY NOTE
Occupational Therapy Evaluation     Patient Name: Mattie Varela  Today's Date: 1/15/2024  Problem List  Principal Problem:    AMS (altered mental status)  Active Problems:    Type 2 diabetes mellitus with other skin complications (HCC)    Iron deficiency anemia secondary to inadequate dietary iron intake    Hypothyroidism    Acute kidney injury (HCC)    Cerebrovascular accident (CVA), unspecified mechanism (HCC)    Abnormal urinalysis    Hypertensive urgency    Encounter for examination for admission to nursing home    Past Medical History  Past Medical History:   Diagnosis Date    Acid reflux     Anemia     hx of iron-deficient    Anxiety     Arthritis     Asthma     last needed inhaler last year 2020    Basal cell carcinoma     upper lip    Chronic narcotic dependence (HCC)     Chronic pain     Colon polyp     Cystocele     Diabetes mellitus (HCC)     stable    Disease of thyroid gland     hypothyroidism    Diverticulosis     Dizziness     at times    Dysfunctional uterine bleeding     last assessed - [id number]    Dysphagia     Fibromyalgia     Gastric ulcer     Gastroparesis     History of colonic polyps     last assessed - [id number]    History of gastroesophageal reflux (GERD)     Hypercholesterolemia     Hyperlipidemia     Hypertension     Hyponatremia     IBS (irritable bowel syndrome)     Pneumobilia 06/18/2022    Post laminectomy syndrome     S/P insertion of spinal cord stimulator 07/18/2018    Seasonal allergies     Shortness of breath     exertional    Spinal stenosis     Status post lumbar spinal fusion 03/16/2018    Stroke ([facility])     pt states slight stroke March 2022     Past Surgical History  Past Surgical History:   Procedure Laterality Date    APPENDECTOMY      BACK SURGERY      BREAST CYST EXCISION Left     CARDIAC CATHETERIZATION  11/14/2023    Procedure: Cardiac catheterization;  Surgeon: aRndolph Holt MD;  [facility];  Service: Cardiology    CARDIAC CATHETERIZATION  N/A 11/14/2023    Procedure: Cardiac Coronary Angiogram;  Surgeon: Randolph Holt MD;  [facility];  Service: Cardiology    CHOLECYSTECTOMY      COLONOSCOPY      ESOPHAGOGASTRODUODENOSCOPY N/A 09/28/2016    Procedure: ESOPHAGOGASTRODUODENOSCOPY (EGD);  Surgeon: Mylene Moeller MD;  Location: [facility];  Service:     HERNIA REPAIR      HYSTERECTOMY      TTAH-BSO age 28    LAMINECTOMY      LUMBAR LAMINECTOMY      OOPHORECTOMY      age 28    OK ARTHRODESIS POSTERIOR/PSTLAT TQ 1NTRSPC THORACIC N/A 06/04/2018    Procedure: Reopening of lumbar incision for T12-L5 posterior instrumented fixation and fusion and T12-L4 posterior decompression;  Surgeon: Wood Rogel MD;  Location: [facility];  Service: Neurosurgery    OK COLONOSCOPY FLX DX W/COLLJ SPEC WHEN PFRMD N/A 03/02/2016    Procedure: EGD AND COLONOSCOPY;  Surgeon: Mylene Moeller MD;  [facility]: [facility];  Service: Gastroenterology    OK DILATION ESOPH UNGUIDED SOUND/BOUGIE 1/MULT PASS N/A 09/28/2016    Procedure: DILATATION ESOPHAGEAL;  Surgeon: Mylene Moeller MD;  [facility]: [facility];  Service: Gastroenterology    OK ESOPHAGOGASTRODUODENOSCOPY TRANSORAL DIAGNOSTIC N/A 07/18/2016    Procedure: ESOPHAGOGASTRODUODENOSCOPY (EGD);  Surgeon: Mylene Moeller MD;  [facility]: [facility];  Service: Gastroenterology    OK INSJ/RPLCMT SPINAL NPG/RCVR POCKET CRTJ&CONNJ Left 06/04/2018    Procedure: removal of left buttock implantable pulse generator and placement of new  implantable pulse generator;  Surgeon: Wood Rogel MD;  [facility]: [facility];  Service: Neurosurgery             01/15/24 1354   OT Last Visit   OT Visit Date 01/15/24   Note Type   Note type Evaluation   Pain Assessment   Pain Assessment Tool 0-10   Pain Score 10 - Worst Possible Pain  (reports decreased to 8/10 following movement)   Pain Location/Orientation Orientation: Lower;Location: Back  (acute on chronic pain)   Pain Onset/Description Onset: Ongoing;Frequency: Constant/Continuous    Effect of Pain on Daily Activities activity tolerance, comfort, transfers   Hospital Pain Intervention(s) Repositioned;Ambulation/increased activity;Emotional support  (RN aware, SLIM made aware)   Restrictions/Precautions   Weight Bearing Precautions Per Order No   Other Precautions Cognitive;Chair Alarm;Bed Alarm;Fall Risk;Multiple lines;Pain   Home Living   Type of Home House   Home Layout Two level;Performs ADLs on one level;Able to live on main level with bedroom/bathroom;Stairs to enter with rails  (4 ELLYN, maintains FFSU c full bathroom)   Bathroom Shower/Tub Tub/shower unit   Bathroom Toilet Standard   Bathroom Equipment Grab bars in shower   Bathroom Accessibility Accessible   Home Equipment Walker;Cane  (rollator at baseline, RW)   Additional Comments sleeps on sofa   Prior Function   Level of [place] Independent with ADLs;Independent with functional mobility;Needs assistance with IADLS  ([employer] HHA A with bathing 2x /week , spongebathes other days)   Lives With Son  (disabled son)   Receives Help From Family  (daughter, son, [employer])   IADLs Family/Friend/Other provides transportation  (Dtr A with meds.)   Falls in the last 6 months 1 to 4  (pt reports recent near fall d/t LE fatigue, son caught patient. 2 additional falls prior to STR. CM reports family reporting multiple recent falls)   Vocational Retired   Comments rollator vs RW at baseline. Recently D/Hans from STR   Lifestyle   Autonomy At baseline, pt is (I) with ADLs, A with IADLs , mod (I) c RW vs rollator. Lives c disabled son + falls (-) driving   Reciprocal Relationships supportiv family   Service to Others retired   Intrinsic Gratification pt expressing it is important for her to maintain her independence   General   Additional Pertinent History pt admitted d/t acute --> chronic LBP. Family expressing increased difficulty at home caring for self. AMS 2/2 UTI. Hx of CVA, DM2, hypertensive urgency   Family/Caregiver Present No  "  Subjective   Subjective Pt tearful throughout session intermittently re: functional status. States \"I dont know how I got like this\" Emotional support provided   ADL   Eating Assistance 5  Supervision/Setup   Grooming Assistance 5  Supervision/Setup  (in seated position, Anticipate min A in stance)   UB Bathing Assistance 4  Minimal Assistance   LB Bathing Assistance 3  Moderate Assistance   UB Dressing Assistance 4  Minimal Assistance   LB Dressing Assistance 3  Moderate Assistance   Toileting Assistance  3  Moderate Assistance   Functional Assistance 3  Moderate Assistance   Additional Comments Did not observe UB bathing, LB bathing, UB dressing, LB dressing, and toileting at time of evaluation, with use of clinical reasoning, pt's performance throughout evaluation indicates that pt may be able to perform these tasks at the levels listed above   Bed Mobility   Supine to Sit 3  Moderate assistance   Additional items Assist x 2;Increased time required;Verbal cues;LE management  (Log roll technique)   Sit to Supine   (seated OOB in recliner at end of session)   Additional Comments initial Min A progressing to supervision to maintain static sitting EOB.   Transfers   Sit to Stand 4  Minimal assistance   Additional items Assist x 1;Increased time required;Verbal cues   Stand to Sit 4  Minimal assistance   Additional items Assist x 1;Increased time required;Verbal cues   Additional Comments increased time, cues for hand placements and body mechanics during transfers , Rw used   Functional Mobility   Functional Mobility 3  Moderate assistance   Additional Comments short distance functional mobility. increased time required, chair brought behind pt d/t fatigue   Additional items Rolling walker   Balance   Static Sitting Fair   Dynamic Sitting Fair -   Static Standing Poor +   Dynamic Standing Poor +   Activity Tolerance   Activity Tolerance Patient limited by fatigue;Other (Comment)  (cognition)   RUE Assessment   RUE " Assessment WFL  (MMT grossly 4/5 based on functional assessment)   LUE Assessment   LUE Assessment WFL  (MMT grossly 4/5 based on functional assessment)   Hand Function   Gross Motor Coordination Functional   Fine Motor Coordination Functional   Sensation   Light Touch (S)  Partial deficits in the RLE;Partial deficits in the LLE;Partial deficits in the RUE;Partial deficits in the LUE  (pt reports new onset numbness in UEs, equal upon assessment. Reports numbness in LEs, inconsistent reports of onset. SLIM resident made aware)   Vision-Basic Assessment   Patient Visual Report   (denies acute changes in vision)   Cognition   Overall Cognitive Status WFL   Arousal/Participation Alert;Cooperative   Attention Within functional limits   Orientation Level Oriented X4   Memory Decreased recall of precautions;Decreased recall of recent events;Decreased short term memory   Following Commands Follows one step commands with increased time or repetition   Comments Pt agreeable to OT session. Inconsistencies reported throughout session, intermittent confusion noted. Admitted c UTI, will continue to assess   Assessment   Limitation Decreased UE strength;Decreased ADL status;Decreased cognition;Decreased Safe judgement during ADL;Decreased self-care trans;Decreased high-level ADLs;Decreased endurance  (balance, + pain, activity tolerance, trunk control. functional reach)   Prognosis Fair   Assessment Patient is a 78 y.o. female seen for OT evaluation at [facility] following admission on 1/13/2024  s/p AMS (altered mental status). Please see above for comprehensive list of comorbidities and significant PMHx impacting functional performance.  Upon initial evaluation, pt appears to be performing below baseline functional status.   Occupational performance is affected by the following deficits: endurance ,  decreased muscular strength , decreased balance , decreased standing tolerance for self care tasks , decreased  trunk control , decreased activity tolerance , impaired memory , impaired judgement and problem solving , and (+) pain . Personal/Environmental factors impacting D/C include: (+) Hx of falls , steps to enter/navigate the home, Assistance needed for ADLs and functional mobility, and High fall risk . Supporting factors include: attitude towards recovery Patient would benefit from OT services within the acute care setting to maximize level of functional independence in the following areas self-care transfers, functional mobility, formal cognitive evaluation, and ADLs.  From OT standpoint, recommendation at time of D/C would be Level 2: moderate resource intensity .   Goals   Patient Goals to be independent with walking, to do exercises   Plan   Treatment Interventions ADL retraining;Functional transfer training;UE strengthening/ROM;Endurance training;Patient/family training;Compensatory technique education;Continued evaluation;Equipment evaluation/education  (cog assessments)   Goal Expiration Date 01/25/24   OT Treatment Day 0   OT Frequency 3-5x/wk   Discharge Recommendation   Rehab Resource Intensity Level, OT II (Moderate Resource Intensity)   Additional Comments  The patient's raw score on the AM-PAC Daily Activity Inpatient Short Form is 16. A raw score of less than 19 suggests the patient may benefit from discharge to post-acute rehabilitation services. Please refer to the recommendation of the Occupational Therapist for safe discharge planning.   AM-PAC Daily Activity Inpatient   Lower Body Dressing 2   Bathing 2   Toileting 2   Upper Body Dressing 3   Grooming 3   Eating 4   Daily Activity Raw Score 16   Daily Activity Standardized Score (Calc for Raw Score >=11) 35.96   AM-PAC Applied Cognition Inpatient   Following a Speech/Presentation 2   Understanding Ordinary Conversation 3   Taking Medications 2   Remembering Where Things Are Placed or Put Away 3   Remembering List of 4-5 Errands 2   Taking Care of  Complicated Tasks 2   Applied Cognition Raw Score 14   Applied Cognition Standardized Score 32.02   End of Consult   Education Provided Yes   Patient Position at End of Consult Bed/Chair alarm activated;All needs within reach;Bedside chair   Nurse Communication Nurse aware of consult   Goals established on initial evaluation in order to achieve pt's goal of being independent, walking more and doing exercises      Pt will complete UB ADLs Mod independent   for increased ADL independence within 10 days.     Pt will complete LB ADLs Min A  for increased ADL independence within 10 days.     Pt will complete toileting Min A  with use of DME for increased ADL independence within 10 days.     Pt will demonstrate proper body mechanics to complete self-care transfers and functional mobility household distances  with Min A and use of LRAD for increased safety and functional independence within 10 days.     Pt will complete bed mobility Supervision for increased independence in repositioning, pressure offloading, and managing comfort.     Pt will demonstrate proper body mechanics and fall prevention strategies during 100% of tx sessions for increased safety awareness during ADL/IADLs    Pt will demonstrate activity tolerance of 30 min in therapeutic tasks for increased participation in meaningful activities upon D/C.    Pt will participate in ongoing cognitive assessments to assist with safe D/C planning and supervision/assistance recommendations.     Pt will demonstrate OOB sitting tolerance of 2-4 hr/day for increased activity tolerance and engagement in leisure activities within 10 days.       Pt benefited from co-session of skilled OT and PT therapists in order to most appropriately address functional deficits d/t extensive physical assistance required for safe mobility .  OT/PT objectives were addressed separately; please see PT note for specific goal areas targeted.       Daylin Schreiber, OT

## 2024-01-15 NOTE — ASSESSMENT & PLAN NOTE
Currently lives with disabled son  Family reports medical noncompliance  Patient minimally ambulatory only able to transfer to bathroom  Significant medical need, unable to provide for self  Family report that they feel unable to provide patient with necessary care at home any longer, request placement after this admission    Plan:  Consult to case management, awaiting PT/OT recommendations

## 2024-01-15 NOTE — PROGRESS NOTES
Mattie Varela is a 78 y.o. female who is currently ordered Vancomycin IV with management by the Pharmacy Consult service.  Relevant clinical data and objective / subjective history reviewed.  Vancomycin Assessment:  Indication and Goal AUC/Trough: Urinary tract infection (goal -600, trough >10)  Clinical Status: stable  Micro:     Renal Function:  SCr: 0.97 mg/dL  CrCl: 40.3 mL/min  Renal replacement: Not on dialysis  Days of Therapy: 3  Current Dose: 1500mg IV loading dose  Vancomycin Plan:  New Dosing IV daily prn level <15  Estimated AUC: 419 mcg*hr/mL  Estimated Trough: 13.7 mcg/mL  Next Level: 24 @ 0600  Renal Function Monitoring: Daily BMP and UOP  Pharmacy will continue to follow closely for s/sx of nephrotoxicity, infusion reactions and appropriateness of therapy.  BMP and CBC will be ordered per protocol. We will continue to follow the patient’s culture results and clinical progress daily.    Jovita Jenkins, Pharmacist

## 2024-01-15 NOTE — PROGRESS NOTES
[facility]  Progress Note  Name: Mattie Varela I  MRN: [id number]  Unit/Bed#: S -01 I Date of Admission: 1/13/2024   Date of Service: 1/15/2024 I Hospital Day: 1    Assessment/Plan   * AMS (altered mental status)  Assessment & Plan  Patient with altered mental status since evening of 1/12  In context of UTI  Patient's baseline allows for a normal conversation with attention and thought process per family  Denies recent injury, absent of focal neurologic defect  Complicated by likely medical noncompliance in recent days  Possible urinary retention  Significant back pain reported 10/10 - unclear contribution of chronic back pain vs acute flank pain    Patient is high risk of delirium  Acute encephalopathy resolved    Plan:  Initiate delirium precautions  Maintain normal sleep/wake cycle  Minimize overnight interruptions, group overnight vitals/labs/nursing checks as possible  Dim lights, close blinds and turn off tv to minimize stimulation and encourage sleep environment in evenings  Ensure that pain is well controlled; Tylenol 975mg Q8H scheduled  Monitor for fecal and urinary retention which may precipitate delirium, initiated to urinary retention protocol  Encourage early mobilization and ambulation  Provide frequent reorientation and redirection  Encourage family and friends at the bedside to help help calm patient if anxious  Avoid medications which may precipitate or worsen delirium such as tramadol, benzodiazepine, anticholinergics, and benadryl  Encourage hydration and nutrition   Redirect unwanted behaviors as first line, avoid physical restraints, use chemical restraint only if all other attempts have been Unsuccessful, would consider Zyprexa 2.5mg IM Q, monitor for orthostatic hypotension and QTc prolongation with repeat dosing, recommend lowest dose possible for shortest duration possible       Treatment for UTI as noted under abnormal urinalysis  Continued patient's home  meds with the exception of opiates and muscarinic antagonist  No need for head imaging at this time given low suspicion for intracranial abnormality      Abnormal urinalysis  Assessment & Plan  Patient with abnormal urinalysis growing Enterococcus faecalis on 1/10  History of recurrent UTI with drug-resistant organisms  Was not treated on 1/10 given MDRO history pending sensitivities  Reports dysuria and flank pain  Urinalysis 1/13 clean                                                    Ampicillin                 <=2.00 ug/ml   Susceptible    Levofloxacin   <=1.00 ug/ml Susceptible    Nitrofurantoin  <=32 ug/ml Susceptible    Tetracycline  >8 ug/ml Resistant    Vancomycin   1.00 ug/ml Susceptible        Patient is allergic to ampicillin and cephalosporins    Plan:  Will treat UTI given urinary symptoms, AMS, and recent positive urinalysis   Vancomycin initiated 1/13      Encounter for examination for admission to nursing home  Assessment & Plan  Currently lives with disabled son  Family reports medical noncompliance  Patient minimally ambulatory only able to transfer to bathroom  Significant medical need, unable to provide for self  Family report that they feel unable to provide patient with necessary care at home any longer, request placement after this admission    Plan:  Consult to case management, awaiting PT/OT recommendations    Hypertensive urgency  Assessment & Plan  Blood Pressure: 144/63  Likely in setting of medical noncompliance    Plan:  Continue home metoprolol succinate 50 Mg twice daily  Continue to monitor, consider adding additional antihypertensive medication if needed  Can consider as needed hydralazine if needed for uncontrolled hypertension    Cerebrovascular accident (CVA), unspecified mechanism (HCC)  Assessment & Plan  History of CVA in addition to CAD    Plan:  Continue home Plavix and statin    Acute kidney injury (HCC)  Assessment & Plan  Recent Labs     01/13/24  1354 01/14/24  0710  01/15/24  0513   CREATININE 1.69* 1.08 0.97   EGFR 28 48 55     Estimated Creatinine Clearance: 40.8 mL/min (by C-G formula based on SCr of 0.97 mg/dL).    Baseline creatinine appears to be 1-1.2  Per chart review patient seems to have frequent SADAF's  S/p 1 L normal saline in ED  Per family, patient has been refusing p.o. intake for several days and retaining urine  Lack of sis azotemia    Suspected etiology: Dehydration  Currently resolved    Plan:  Strict I's and O's  Urinary retention protocol    Hypothyroidism  Assessment & Plan  Lab Results   Component Value Date    RCC9GZTOWBXC 3.899 11/26/2023     Continue home levothyroxine 37.5 mcg  Mind that the patient may have recently been noncompliant, TSH out of limits may not indicate need for dose change    Iron deficiency anemia secondary to inadequate dietary iron intake  Assessment & Plan  Recent Labs     01/13/24  1354 01/14/24  0558 01/15/24  0513   HGB 11.1* 8.7* 8.1*     Continue home ferrous sulfate 325 mg daily    Type 2 diabetes mellitus with other skin complications (HCC)  Assessment & Plan  Lab Results   Component Value Date    HGBA1C 6.5 (H) 11/26/2023       Recent Labs     01/14/24  1511 01/14/24  2049 01/15/24  0730 01/15/24  1131   POCGLU 163* 323* 128 206*       Blood Sugar Average: Last 72 hrs:  (P) [id number]    Hold home metformin  Insulin sliding scale  Hypoglycemic protocol    Hyponatremia-resolved as of 1/15/2024  Assessment & Plan  Lab Results   Component Value Date    SODIUM 140 01/15/2024    SODIUM 140 01/14/2024    SODIUM 132 (L) 01/13/2024      Suspected etiology: Low solute intake    Received 1 L normal saline in ED  Currently resolved    Plan:  Encourage p.o. intake  Monitor with daily metabolic panel               VTE Pharmacologic Prophylaxis: VTE Score: 4 Moderate Risk (Score 3-4) - Pharmacological DVT Prophylaxis Ordered: heparin.    Mobility:   Basic Mobility Inpatient Raw Score: 10  -[facility] Goal: 4: Move to  chair/commode  -HLM Achieved: 2: Bed activities/Dependent transfer  HLM Goal NOT achieved. Continue with multidisciplinary rounding and encourage appropriate mobility to improve upon HLM goals.    Patient Centered Rounds: I performed bedside rounds with nursing staff today.  Discussions with Specialists or Other Care Team Provider: Attending physician, senior resident    Education and Discussions with Family / Patient: Updated  (daughter) via phone.    Current Length of Stay: 1 day(s)  Current Patient Status: Inpatient   Discharge Plan: Anticipate discharge tomorrow to discharge location to be determined pending rehab evaluations.    Code Status: Level 3 - DNAR and DNI    Subjective:   Evaluated patient at bedside this morning, patient stated that she was feeling well despite having some chronic lower back pain.  Was agreeable to trying patch at this time.  Otherwise mentating well, oriented x 3 and understands that she had UTI which is being treated.  Denying any fevers, lightheadedness, dizziness, nausea, vomiting, chest pains, abdominal pains, dysuria, urinary hesitancy.    Objective:     Vitals:   Temp (24hrs), Av.3 °F (36.8 °C), Min:97.7 °F (36.5 °C), Max:99.1 °F (37.3 °C)    Temp:  [97.7 °F (36.5 °C)-99.1 °F (37.3 °C)] 97.7 °F (36.5 °C)  HR:  [77-95] 77  Resp:  [16-18] 16  BP: (130-144)/(60-72) 144/63  SpO2:  [95 %-96 %] 95 %  Body mass index is 30.78 kg/m².     Input and Output Summary (last 24 hours):     Intake/Output Summary (Last 24 hours) at 1/15/2024 1452  Last data filed at 1/15/2024 0538  Gross per 24 hour   Intake 870 ml   Output 954 ml   Net -84 ml       Physical Exam:   Physical Exam  Vitals and nursing note reviewed.   Constitutional:       General: She is not in acute distress.     Appearance: She is well-developed. She is not toxic-appearing.   HENT:      Head: Normocephalic and atraumatic.   Eyes:      Conjunctiva/sclera: Conjunctivae normal.   Cardiovascular:      Rate and  Rhythm: Normal rate. Rhythm irregular.      Heart sounds: Murmur heard.   Pulmonary:      Effort: Pulmonary effort is normal. No respiratory distress.      Breath sounds: Normal breath sounds.   Abdominal:      Palpations: Abdomen is soft.      Tenderness: There is no abdominal tenderness.   Musculoskeletal:      Cervical back: Neck supple.      Right lower leg: Edema present.      Left lower leg: Edema present.   Skin:     General: Skin is warm and dry.      Capillary Refill: Capillary refill takes less than 2 seconds.   Neurological:      Mental Status: She is alert and oriented to person, place, and time.      Comments: Patient tends to wax and wane, appears to be more oriented today than in the past   Psychiatric:         Mood and Affect: Mood normal.          Additional Data:     Labs:  Results from last 7 days   Lab Units 01/15/24  0513 01/14/24  0558   WBC Thousand/uL 5.64 7.11   HEMOGLOBIN g/dL 8.1* 8.7*   HEMATOCRIT % 26.9* 27.5*   PLATELETS Thousands/uL 224 225   NEUTROS PCT %  --  72   LYMPHS PCT %  --  14   MONOS PCT %  --  13*   EOS PCT %  --  1     Results from last 7 days   Lab Units 01/15/24  0513 01/14/24  0710   SODIUM mmol/L 140 140   POTASSIUM mmol/L 4.4 4.5   CHLORIDE mmol/L 108 107   CO2 mmol/L 27 25   BUN mg/dL 18 22   CREATININE mg/dL 0.97 1.08   ANION GAP mmol/L 5 8   CALCIUM mg/dL 8.2* 7.6*   ALBUMIN g/dL  --  3.1*   TOTAL BILIRUBIN mg/dL  --  0.44   ALK PHOS U/L  --  58   ALT U/L  --  6*   AST U/L  --  11*   GLUCOSE RANDOM mg/dL 122 130     Results from last 7 days   Lab Units 01/13/24  1354   INR  0.88     Results from last 7 days   Lab Units 01/15/24  1131 01/15/24  0730 01/14/24  2049 01/14/24  1511 01/14/24  1111 01/14/24  0733 01/14/24  0048 01/13/24  1804 01/13/24  1340   POC GLUCOSE mg/dl 206* 128 323* 163* 161* 118 148* 209* 203*         Results from last 7 days   Lab Units 01/13/24  1354   LACTIC ACID mmol/L 1.1   PROCALCITONIN ng/ml 0.08       Lines/Drains:  Invasive Devices        Peripheral Intravenous Line  Duration             Peripheral IV 01/13/24 Left Antecubital 2 days    Peripheral IV 01/14/24 Distal;Right;Ventral (anterior) Forearm 1 day                          Imaging: Reviewed radiology reports from this admission including: chest xray    Recent Cultures (last 7 days):   Results from last 7 days   Lab Units 01/13/24  1438 01/13/24  1354 01/10/24  1847   BLOOD CULTURE  No Growth at 24 hrs. No Growth at 24 hrs.  --    URINE CULTURE   --   --  40,000-49,000 cfu/ml Enterococcus faecalis*  20,000-29,000 cfu/ml       Last 24 Hours Medication List:   Current Facility-Administered Medications   Medication Dose Route Frequency Provider Last Rate    acetaminophen  650 mg Oral Q8H [facility] Ray Velazco MD      albuterol  2 puff Inhalation Q6H PRN Zak Jones MD      amLODIPine  5 mg Oral Daily Ray Velazco MD      aspirin  81 mg Oral Daily Zak Jones MD      atorvastatin  40 mg Oral Daily Zak Jones MD      baclofen  10 mg Oral BID PRN Zak Jones MD      clopidogrel  75 mg Oral Daily Zak Jones MD      ferrous sulfate  325 mg Oral Daily With Breakfast Zak Jones MD      heparin (porcine)  5,000 Units Subcutaneous Q8H [facility] Zak Jones MD      insulin lispro  1-6 Units Subcutaneous TID AC Zak Jones MD      insulin lispro  1-6 Units Subcutaneous HS Zak Jones MD      levothyroxine  37.5 mcg Oral Early Morning Zak Jones MD      lidocaine  1 patch Topical Daily Zak Jones MD      metoprolol succinate  50 mg Oral Q12H [facility] Zak Jones MD      Milnacipran HCl  1 tablet Oral Daily Zak Jones MD      naloxone  0.04 mg Intravenous Q1MIN PRN Naveen Pittman MD      nystatin   Topical BID Naveen Pittman MD      ondansetron  4 mg Intravenous Q6H PRN Zak Jones MD      oxyCODONE-acetaminophen  1 tablet Oral Q4H PRN aRy Velazco MD      pantoprazole  40 mg Oral Daily Zak Jones MD      polyethylene glycol  17 g Oral Daily  Zak Jones MD      ranolazine  500 mg Oral Q12H ARY Zak Jones MD      senna-docusate sodium  1 tablet Oral HS Zak Jones MD      sodium chloride  1 spray Each Nare Q1H PRN Alejandro Philip MD      vancomycin  1,000 mg Intravenous Q24H Luis Wiggins MD 1,000 mg (01/15/24 0850)        Today, Patient Was Seen By: Alejandro Philip MD    **Please Note: This note may have been constructed using a voice recognition system.**

## 2024-01-15 NOTE — CASE MANAGEMENT
Case Management Progress Note    Patient name Mattie Varela  Location S /S -01 MRN [id number]  : 1945 Date 1/15/2024       LOS (days): 1  Geometric Mean LOS (GMLOS) (days):   Days to GMLOS:        OBJECTIVE:        Current admission status: Inpatient  Preferred Pharmacy:   [id number] - [place], [place] - [address]  [address] [address]  Phone: [phone] Fax: [phone]    [id number] [place] [facility] [address] [facility] [address]  Phone: [phone] Fax: [phone]    [id number] [place] [place] [facility] [address]  [address] [facility] [address]  Phone: [phone] Fax: [phone]    [id number] [place] - [address]  [address] [facility] [address]  Phone: [phone] Fax: [phone]    [facility] [email or web address]  [email or web address]  [address] [place] [address]  Phone: [phone] Fax: [phone]    Primary Care Provider: NANY Pollock    Primary Insurance: [employer] [facility]  Secondary Insurance:     PROGRESS NOTE:    PT/OT recommending subacute rehab for patient at this time. Multiple conversations today with Duarte at [facility] re: plan for STR with likely transition to LTC. Duarte reports that if patient/family sign her off of [facility], she will be converted back to traditional Medicare as of . Aware that family interested in [facility] as SNF option and that referral was sent, but awaiting their review (appears office is closed today secondary to [place] Dieudonne Day); VM left for admissions requesting return call in AM.     Only bed offers received at this time are [facility] and [facility]. Return call made to patient's daughter, Dr. Venice Baires, and VM left relaying conversation with [employer] and  need to follow-up with Chuck in AM once admissions team back in the office.     Will continue to follow for STR/LTC placement at this time.

## 2024-01-15 NOTE — ASSESSMENT & PLAN NOTE
Lab Results   Component Value Date    SODIUM 140 01/15/2024    SODIUM 140 01/14/2024    SODIUM 132 (L) 01/13/2024      Suspected etiology: Low solute intake    Received 1 L normal saline in ED  Currently resolved    Plan:  Encourage p.o. intake  Monitor with daily metabolic panel

## 2024-01-15 NOTE — UTILIZATION REVIEW
NOTIFICATION OF INPATIENT ADMISSION   AUTHORIZATION REQUEST   SERVICING FACILITY:   [place] [address]  Tax ID: [id number]  NPI: [id number]   ATTENDING PROVIDER:  Attending Name and NPI#: Luis Wiggins Md [[id number]]  Address: [address] [place] [address]  Phone: [phone]     ADMISSION INFORMATION:  Place of Service: Inpatient [facility]  Place of Service Code: 21  Inpatient Admission Date/Time: 1/14/24 10:03 AM  Discharge Date/Time: No discharge date for patient encounter.  Admitting Diagnosis Code/Description:  Multifocal atrial tachycardia [I47.19]  Acute abdominal pain [R10.9]  Enteritis [K52.9]  Hyponatremia [E87.1]  UTI (urinary tract infection) [N39.0]  Elevated blood pressure reading [R03.0]  Anemia [D64.9]  Tachycardia [R00.0]  Elevated serum creatinine [R79.89]  Acute encephalopathy [G93.40]  AMS (altered mental status) [R41.82]     UTILIZATION REVIEW CONTACT:  Romeo Carter, Utilization   Network Utilization Review Department  Phone: [phone]  Fax: [phone]  Email: [email or web address]  Contact for approvals/pending authorizations, clinical reviews, and discharge.     PHYSICIAN ADVISORY SERVICES:  Medical Necessity Denial & [place] Review  Phone: [phone]  Fax: [phone]  Email: [email or web address]     DISCHARGE SUPPORT TEAM:  For Patients Discharge Needs & Updates  Phone: [phone] opt. 2 Fax: [phone]  Email: [email or web address]

## 2024-01-15 NOTE — ASSESSMENT & PLAN NOTE
Blood Pressure: 144/63  Likely in setting of medical noncompliance    Plan:  Continue home metoprolol succinate 50 Mg twice daily  Continue to monitor, consider adding additional antihypertensive medication if needed  Can consider as needed hydralazine if needed for uncontrolled hypertension

## 2024-01-15 NOTE — PLAN OF CARE
Problem: OCCUPATIONAL THERAPY ADULT  Goal: Performs self-care activities at highest level of function for planned discharge setting.  See evaluation for individualized goals.  Description: Treatment Interventions: ADL retraining, Functional transfer training, UE strengthening/ROM, Endurance training, Patient/family training, Compensatory technique education, Continued evaluation, Equipment evaluation/education (cog assessments)          See flowsheet documentation for full assessment, interventions and recommendations.   Note: Limitation: Decreased UE strength, Decreased ADL status, Decreased cognition, Decreased Safe judgement during ADL, Decreased self-care trans, Decreased high-level ADLs, Decreased endurance (balance, + pain, activity tolerance, trunk control. functional reach)  Prognosis: Fair  Assessment: Patient is a 78 y.o. female seen for OT evaluation at [facility] following admission on 1/13/2024  s/p AMS (altered mental status). Please see above for comprehensive list of comorbidities and significant PMHx impacting functional performance.  Upon initial evaluation, pt appears to be performing below baseline functional status.   Occupational performance is affected by the following deficits: endurance ,  decreased muscular strength , decreased balance , decreased standing tolerance for self care tasks , decreased trunk control , decreased activity tolerance , impaired memory , impaired judgement and problem solving , and (+) pain . Personal/Environmental factors impacting D/C include: (+) Hx of falls , steps to enter/navigate the home, Assistance needed for ADLs and functional mobility, and High fall risk . Supporting factors include: attitude towards recovery Patient would benefit from OT services within the acute care setting to maximize level of functional independence in the following areas self-care transfers, functional mobility, formal cognitive evaluation, and ADLs.  From OT  standpoint, recommendation at time of D/C would be Level 2: moderate resource intensity .     Rehab Resource Intensity Level, OT: II (Moderate Resource Intensity)        Daylin Schreiber, OT

## 2024-01-15 NOTE — PHYSICAL THERAPY NOTE
Physical Therapy Evaluation    Patient's Name: Mattie Varela    Admitting Diagnosis  Multifocal atrial tachycardia [I47.19]  Acute abdominal pain [R10.9]  Enteritis [K52.9]  Hyponatremia [E87.1]  UTI (urinary tract infection) [N39.0]  Elevated blood pressure reading [R03.0]  Anemia [D64.9]  Tachycardia [R00.0]  Elevated serum creatinine [R79.89]  Acute encephalopathy [G93.40]  AMS (altered mental status) [R41.82]    Problem List  Patient Active Problem List   Diagnosis    Failed back surgical syndrome    Degenerative lumbar spinal stenosis    Type 2 diabetes mellitus with other skin complications (HCC)    Chronic pain disorder    Dyspepsia    Glaucoma    Hypertension    Iron deficiency anemia secondary to inadequate dietary iron intake    Anxiety    Hypercholesterolemia    Hypothyroidism    Overactive bladder    S/P insertion of spinal cord stimulator    Mild intermittent asthma without complication    Prepyloric ulcer    Diarrhea    Stroke-like symptoms    Acute kidney injury (HCC)    Cerebrovascular accident (CVA), unspecified mechanism (HCC)    Confusion with body shakes and blank stare    Normocytic anemia    Bilateral lower extremity edema    Chronic obstructive pulmonary disease, unspecified COPD type (HCC)    Abnormal urinalysis    History of lumbar surgery    Non obstructive CAD    SADAF (acute kidney injury) (HCC)    Tremor    Exertional shortness of breath    Urinary retention    Left ventricular outflow tract obstruction    Obesity, Class I, BMI 30-34.9    Stage 3a chronic kidney disease (HCC)    Hypertensive urgency    AMS (altered mental status)    Encounter for examination for admission to nursing home       Past Medical History  Past Medical History:   Diagnosis Date    Acid reflux     Anemia     hx of iron-deficient    Anxiety     Arthritis     Asthma     last needed inhaler last year 2020    Basal cell carcinoma     upper lip    Chronic narcotic dependence (HCC)     Chronic pain     Colon polyp      Cystocele     Diabetes mellitus (HCC)     stable    Disease of thyroid gland     hypothyroidism    Diverticulosis     Dizziness     at times    Dysfunctional uterine bleeding     last assessed - [id number]    Dysphagia     Fibromyalgia     Gastric ulcer     Gastroparesis     History of colonic polyps     last assessed - [id number]    History of gastroesophageal reflux (GERD)     Hypercholesterolemia     Hyperlipidemia     Hypertension     Hyponatremia     IBS (irritable bowel syndrome)     Pneumobilia 06/18/2022    Post laminectomy syndrome     S/P insertion of spinal cord stimulator 07/18/2018    Seasonal allergies     Shortness of breath     exertional    Spinal stenosis     Status post lumbar spinal fusion 03/16/2018    Stroke (HCC)     pt states slight stroke March 2022       Past Surgical History  Past Surgical History:   Procedure Laterality Date    APPENDECTOMY      BACK SURGERY      BREAST CYST EXCISION Left     CARDIAC CATHETERIZATION  11/14/2023    Procedure: Cardiac catheterization;  Surgeon: Randolph Holt MD;  [facility];  Service: Cardiology    CARDIAC CATHETERIZATION N/A 11/14/2023    Procedure: Cardiac Coronary Angiogram;  Surgeon: Randolph Holt MD;  [facility];  Service: Cardiology    CHOLECYSTECTOMY      COLONOSCOPY      ESOPHAGOGASTRODUODENOSCOPY N/A 09/28/2016    Procedure: ESOPHAGOGASTRODUODENOSCOPY (EGD);  Surgeon: Mylene Moeller MD;  Location: [facility];  Service:     HERNIA REPAIR      HYSTERECTOMY      TTAH-BSO age 28    LAMINECTOMY      LUMBAR LAMINECTOMY      OOPHORECTOMY      age 28    DC ARTHRODESIS POSTERIOR/PSTLAT TQ 1NTRSPC THORACIC N/A 06/04/2018    Procedure: Reopening of lumbar incision for T12-L5 posterior instrumented fixation and fusion and T12-L4 posterior decompression;  Surgeon: Wood Rogel MD;  Location: [facility];  Service: Neurosurgery    DC COLONOSCOPY FLX DX W/COLLJ SPEC WHEN PFRMD N/A 03/02/2016    Procedure: EGD AND  COLONOSCOPY;  Surgeon: Mylene Moeller MD;  [facility]: [facility];  Service: Gastroenterology    WI DILATION ESOPH UNGUIDED SOUND/BOUGIE 1/MULT PASS N/A 2016    Procedure: DILATATION ESOPHAGEAL;  Surgeon: Mylene Moeller MD;  [facility];  Service: Gastroenterology    WI ESOPHAGOGASTRODUODENOSCOPY TRANSORAL DIAGNOSTIC N/A 2016    Procedure: ESOPHAGOGASTRODUODENOSCOPY (EGD);  Surgeon: Mylene Moeller MD;  [facility];  Service: Gastroenterology    WI INSJ/RPLCMT SPINAL NPG/RCVR POCKET CRTJ&CONNJ Left 2018    Procedure: removal of left buttock implantable pulse generator and placement of new  implantable pulse generator;  Surgeon: Wood Rogel MD;  [facility];  Service: Neurosurgery          01/15/24 1436   Note Type   Note type Evaluation   Pain Assessment   Pain Assessment Tool 0-10   Pain Score 10 - Worst Possible Pain  (progressing to 8 with mobility)   Pain Location/Orientation Orientation: Right;Orientation: Lower;Location: Back   Restrictions/Precautions   Weight Bearing Precautions Per Order No   Other Precautions Cognitive;Bed Alarm;Chair Alarm;Fall Risk;Multiple lines   Home Living   Type of Home House   Home Layout Two level;Performs ADLs on one level;Able to live on main level with bedroom/bathroom;Stairs to enter with rails  (4 ELLYN)   Bathroom Shower/Tub Tub/shower unit   Bathroom Toilet Standard   Bathroom Equipment Grab bars in shower   Bathroom Accessibility Accessible   Home Equipment Walker;Cane   Prior Function   Lives With Son   Receives Help From Family   Falls in the last 6 months 1 to 4  (x2)   Comments at baseline pt yuko kahn, lives with her son   Cognition   Orientation Level Oriented X4   Following Commands Follows one step commands with increased time or repetition   Comments pt ID by wristband, name and    Subjective   Subjective pt seated EOB with OT as PT enters, pt noting LBP which improves with movement   RLE Assessment   RLE Assessment  X  (3+/5 grossly assessed with mobility)   LLE Assessment   LLE Assessment X  (3+/5 grossly assessed with mobility)   Light Touch   RLE Light Touch Grossly intact  (pt with c/o numbness and tingling, questionable onset chronic vs acute)   LLE Light Touch Grossly intact   Bed Mobility   Additional Comments pt received sitting EOB   Transfers   Sit to Stand 4  Minimal assistance   Additional items Assist x 1;Increased time required;Verbal cues   Stand to Sit 4  Minimal assistance   Additional items Assist x 1;Increased time required;Verbal cues   Ambulation/Elevation   Gait pattern Decreased foot clearance;Short stride;Improper Weight shift   Gait Assistance 3  Moderate assist   Additional items Assist x 1;Verbal cues   Assistive Device Rolling walker   Distance 5'x1   Ambulation/Elevation Additional Comments 0   Balance   Static Sitting Fair   Dynamic Sitting Fair -   Activity Tolerance   Activity Tolerance Patient limited by pain;Treatment limited secondary to medical complications (Comment)  (cognition)   Medical Staff Made Aware care coordianted with KEATON epstein, spoke with CM   Nurse Made Aware Lula lopez pre/post   Assessment   Prognosis Fair   Problem List Decreased strength;Decreased endurance;Impaired balance;Decreased mobility;Decreased cognition;Impaired judgement;Obesity   Assessment Pt is a 78 y.o. female seen for PT evaluation s/p admit to [facility] on 1/13/2024. Pt was admitted with a primary dx of: AMS.  PT now consulted for assessment of mobility and d/c needs. Pt with Ambulate orders.  Pts current comorbidities and personal factors effecting treatment include: BMI, chronic LBP, DM II, glaucoma, CKD, HLD, HTN, chronic pain syndrome. Pts current clinical presentation is Unstable/Unpredictable (high complexity) due to Ongoing medical management for primary dx, Decreased activity tolerance compared to baseline, Fall risk, Increased assistance needed from caregiver at current time, Ongoing telemetry  monitoring, Cog status. Prior to admission, pt was independent with use of RW. Upon evaluation, pt currently is requiring ; Mynor for transfers and ModA for ambulation 5 ft w/ RW. Pt presents at PT eval functioning below baseline and currently w/ overall mobility deficits 2* to: BLE weakness, impaired balance, gait deviations, pain, decreased activity tolerance compared to baseline, decreased functional mobility tolerance compared to baseline, impaired judgement, fall risk, decreased cognition. Pt currently at a fall risk 2* to impairments listed above.  Pt will continue to benefit from skilled acute PT interventions to address stated impairments; to maximize functional mobility; for ongoing pt/ family training; and DME needs. At conclusion of PT session chair alarm engaged, all needs in reach, RN notified of session findings/recommendations, and pt returned back in recliner chair with phone and call bell within reach. Pt denies any further questions at this time. Recommend Level II (Moderate Resource Intensity)  upon hospital D/C.   Goals   Patient Goals to get back to being independent   STG Expiration Date 01/25/24   Short Term Goal #1 In 10 days pt will be able to: 1. Demonstrate ability to perform all aspects of bed mobility with Mynor to improve functional safety.  2. Perform functional transfers with supervision to facilitate safe return to previous living environment.  3.  Ambulate at least 75  ft with LRAD and supervision with stable vitals to improve safety with household distances and reduce fall risk.  4. Improve LE strength grades by 1 to increase ease of functional mobility with transfers and gait. 5. Pt will demonstrate improved balance by one grade in order to decrease risk of falls.   PT Treatment Day 0   Plan   Treatment/Interventions Functional transfer training;LE strengthening/ROM;Therapeutic exercise;Cognitive reorientation;Patient/family training;Equipment eval/education;Bed mobility;Gait  training;Spoke to nursing;Spoke to case management;OT   PT Frequency 3-5x/wk   Discharge Recommendation   Rehab Resource Intensity Level, PT II (Moderate Resource Intensity)   Equipment Recommended Walker   Walker Package Recommended Wheeled walker   Change/add to Walker Package? No   AM-PAC Basic Mobility Inpatient   Turning in Flat Bed Without Bedrails 2   Lying on Back to Sitting on Edge of Flat Bed Without Bedrails 2   Moving Bed to Chair 3   Standing Up From Chair Using Arms 3   Walk in Room 2   Climb 3-5 Stairs With Railing 1   Basic Mobility Inpatient Raw Score 13   Basic Mobility Standardized Score 33.99   Highest Level Of Mobility   -HLM Goal 4: Move to chair/commode   JH-HLM Achieved 4: Move to chair/commode   End of Consult   Patient Position at End of Consult Bedside chair;Bed/Chair alarm activated;All needs within reach   The patient's AM-PAC Basic Mobility Inpatient Short Form Raw Score is 13. A Raw score of less than or equal to 16 suggests the patient may benefit from discharge to post-acute rehabilitation services. Please also refer to the recommendation of the Physical Therapist for safe discharge planning.    Naveen Mesa, PT

## 2024-01-16 ENCOUNTER — APPOINTMENT (INPATIENT)
Dept: RADIOLOGY | Facility: HOSPITAL | Age: 79
DRG: 682 | End: 2024-01-16
Payer: MEDICARE

## 2024-01-16 ENCOUNTER — PATIENT OUTREACH (OUTPATIENT)
Dept: FAMILY MEDICINE CLINIC | Facility: CLINIC | Age: 79
End: 2024-01-16

## 2024-01-16 DIAGNOSIS — Z78.9 NEED FOR FOLLOW-UP BY SOCIAL WORKER: Primary | ICD-10-CM

## 2024-01-16 PROBLEM — N17.9 ACUTE KIDNEY INJURY (HCC): Status: RESOLVED | Noted: 2022-06-18 | Resolved: 2024-01-16

## 2024-01-16 PROBLEM — E44.0 MODERATE PROTEIN-CALORIE MALNUTRITION (HCC): Status: ACTIVE | Noted: 2024-01-16

## 2024-01-16 LAB
ANION GAP SERPL CALCULATED.3IONS-SCNC: 6 MMOL/L
BUN SERPL-MCNC: 19 MG/DL (ref 5–25)
CALCIUM SERPL-MCNC: 8.8 MG/DL (ref 8.4–10.2)
CHLORIDE SERPL-SCNC: 105 MMOL/L (ref 96–108)
CO2 SERPL-SCNC: 26 MMOL/L (ref 21–32)
CREAT SERPL-MCNC: 0.95 MG/DL (ref 0.6–1.3)
ERYTHROCYTE [DISTWIDTH] IN BLOOD BY AUTOMATED COUNT: 14.8 % (ref 11.6–15.1)
GFR SERPL CREATININE-BSD FRML MDRD: 56 ML/MIN/1.73SQ M
GLUCOSE SERPL-MCNC: 117 MG/DL (ref 65–140)
GLUCOSE SERPL-MCNC: 132 MG/DL (ref 65–140)
GLUCOSE SERPL-MCNC: 132 MG/DL (ref 65–140)
GLUCOSE SERPL-MCNC: 134 MG/DL (ref 65–140)
GLUCOSE SERPL-MCNC: 215 MG/DL (ref 65–140)
HCT VFR BLD AUTO: 27.6 % (ref 34.8–46.1)
HGB BLD-MCNC: 8.4 G/DL (ref 11.5–15.4)
MCH RBC QN AUTO: 29.5 PG (ref 26.8–34.3)
MCHC RBC AUTO-ENTMCNC: 30.4 G/DL (ref 31.4–37.4)
MCV RBC AUTO: 97 FL (ref 82–98)
PLATELET # BLD AUTO: 239 THOUSANDS/UL (ref 149–390)
PMV BLD AUTO: 10.1 FL (ref 8.9–12.7)
POTASSIUM SERPL-SCNC: 4.5 MMOL/L (ref 3.5–5.3)
RBC # BLD AUTO: 2.85 MILLION/UL (ref 3.81–5.12)
SODIUM SERPL-SCNC: 137 MMOL/L (ref 135–147)
WBC # BLD AUTO: 5.16 THOUSAND/UL (ref 4.31–10.16)

## 2024-01-16 PROCEDURE — 82948 REAGENT STRIP/BLOOD GLUCOSE: CPT

## 2024-01-16 PROCEDURE — 99232 SBSQ HOSP IP/OBS MODERATE 35: CPT | Performed by: HOSPITALIST

## 2024-01-16 PROCEDURE — 72100 X-RAY EXAM L-S SPINE 2/3 VWS: CPT

## 2024-01-16 PROCEDURE — 80048 BASIC METABOLIC PNL TOTAL CA: CPT

## 2024-01-16 PROCEDURE — 85027 COMPLETE CBC AUTOMATED: CPT

## 2024-01-16 RX ORDER — LANOLIN ALCOHOL/MO/W.PET/CERES
6 CREAM (GRAM) TOPICAL
Status: DISCONTINUED | OUTPATIENT
Start: 2024-01-16 | End: 2024-01-17 | Stop reason: HOSPADM

## 2024-01-16 RX ORDER — PHENAZOPYRIDINE HYDROCHLORIDE 100 MG/1
100 TABLET, FILM COATED ORAL
Status: DISPENSED | OUTPATIENT
Start: 2024-01-16 | End: 2024-01-17

## 2024-01-16 RX ORDER — FUROSEMIDE 20 MG/1
20 TABLET ORAL ONCE
Status: COMPLETED | OUTPATIENT
Start: 2024-01-16 | End: 2024-01-16

## 2024-01-16 RX ADMIN — PHENAZOPYRIDINE 100 MG: 100 TABLET ORAL at 12:57

## 2024-01-16 RX ADMIN — MILNACIPRAN HYDROCHLORIDE 100 MG: 100 TABLET, FILM COATED ORAL at 09:43

## 2024-01-16 RX ADMIN — METOPROLOL SUCCINATE 50 MG: 50 TABLET, EXTENDED RELEASE ORAL at 09:41

## 2024-01-16 RX ADMIN — VANCOMYCIN HYDROCHLORIDE 1000 MG: 1 INJECTION, SOLUTION INTRAVENOUS at 09:52

## 2024-01-16 RX ADMIN — FERROUS SULFATE TAB 325 MG (65 MG ELEMENTAL FE) 325 MG: 325 (65 FE) TAB at 09:42

## 2024-01-16 RX ADMIN — LEVOTHYROXINE SODIUM 37.5 MCG: 75 TABLET ORAL at 05:17

## 2024-01-16 RX ADMIN — HEPARIN SODIUM 5000 UNITS: 5000 INJECTION INTRAVENOUS; SUBCUTANEOUS at 12:56

## 2024-01-16 RX ADMIN — RANOLAZINE 500 MG: 500 TABLET, FILM COATED, EXTENDED RELEASE ORAL at 12:56

## 2024-01-16 RX ADMIN — RANOLAZINE 500 MG: 500 TABLET, FILM COATED, EXTENDED RELEASE ORAL at 21:43

## 2024-01-16 RX ADMIN — ATORVASTATIN CALCIUM 40 MG: 40 TABLET, FILM COATED ORAL at 09:42

## 2024-01-16 RX ADMIN — NYSTATIN 1 APPLICATION: 100000 POWDER TOPICAL at 09:46

## 2024-01-16 RX ADMIN — INSULIN LISPRO 2 UNITS: 100 INJECTION, SOLUTION INTRAVENOUS; SUBCUTANEOUS at 12:57

## 2024-01-16 RX ADMIN — PANTOPRAZOLE SODIUM 40 MG: 40 TABLET, DELAYED RELEASE ORAL at 09:41

## 2024-01-16 RX ADMIN — ASPIRIN 81 MG CHEWABLE TABLET 81 MG: 81 TABLET CHEWABLE at 09:41

## 2024-01-16 RX ADMIN — HEPARIN SODIUM 5000 UNITS: 5000 INJECTION INTRAVENOUS; SUBCUTANEOUS at 05:18

## 2024-01-16 RX ADMIN — NYSTATIN 1 APPLICATION: 100000 POWDER TOPICAL at 18:12

## 2024-01-16 RX ADMIN — SENNOSIDES, DOCUSATE SODIUM 1 TABLET: 8.6; 5 TABLET ORAL at 21:43

## 2024-01-16 RX ADMIN — FUROSEMIDE 20 MG: 20 TABLET ORAL at 09:41

## 2024-01-16 RX ADMIN — ONDANSETRON 4 MG: 2 INJECTION INTRAMUSCULAR; INTRAVENOUS at 18:11

## 2024-01-16 RX ADMIN — AMLODIPINE BESYLATE 5 MG: 5 TABLET ORAL at 09:42

## 2024-01-16 RX ADMIN — POLYETHYLENE GLYCOL 3350 17 G: 17 POWDER, FOR SOLUTION ORAL at 09:43

## 2024-01-16 RX ADMIN — CLOPIDOGREL BISULFATE 75 MG: 75 TABLET ORAL at 09:42

## 2024-01-16 RX ADMIN — ACETAMINOPHEN 650 MG: 325 TABLET, FILM COATED ORAL at 05:17

## 2024-01-16 RX ADMIN — ACETAMINOPHEN 650 MG: 325 TABLET, FILM COATED ORAL at 21:43

## 2024-01-16 RX ADMIN — HEPARIN SODIUM 5000 UNITS: 5000 INJECTION INTRAVENOUS; SUBCUTANEOUS at 21:43

## 2024-01-16 RX ADMIN — OXYCODONE HYDROCHLORIDE AND ACETAMINOPHEN 1 TABLET: 5; 325 TABLET ORAL at 09:42

## 2024-01-16 RX ADMIN — Medication 6 MG: at 21:43

## 2024-01-16 RX ADMIN — METOPROLOL SUCCINATE 50 MG: 50 TABLET, EXTENDED RELEASE ORAL at 21:44

## 2024-01-16 RX ADMIN — LIDOCAINE 5% 1 PATCH: 700 PATCH TOPICAL at 09:42

## 2024-01-16 RX ADMIN — ACETAMINOPHEN 650 MG: 325 TABLET, FILM COATED ORAL at 12:56

## 2024-01-16 NOTE — PROGRESS NOTES
The patient's vancomycin therapy has been completed/discontinued. Thank you for allowing us to take part in this patient's care. Pharmacy will sign-off now. Please call or re-consult if there are any questions or changes.

## 2024-01-16 NOTE — ASSESSMENT & PLAN NOTE
Recent Labs     01/14/24  0710 01/15/24  0513 01/16/24  0445   CREATININE 1.08 0.97 0.95   EGFR 48 55 56       Estimated Creatinine Clearance: 41.7 mL/min (by C-G formula based on SCr of 0.95 mg/dL).    Baseline creatinine appears to be 1-1.2  Per chart review patient seems to have frequent SADAF's  S/p 1 L normal saline in ED  Per family, patient has been refusing p.o. intake for several days and retaining urine  Lack of sis azotemia    Suspected etiology: Dehydration  Currently resolved    Plan:  Strict I's and O's  Urinary retention protocol

## 2024-01-16 NOTE — CASE MANAGEMENT
Case Management Progress Note    Patient name Mattie Varela  Location S /S -01 MRN [id number]  : 1945 Date 2024       LOS (days): 2  Geometric Mean LOS (GMLOS) (days): 3.1  Days to GMLOS:0.8        OBJECTIVE:        Current admission status: Inpatient  Preferred Pharmacy:   [id number] - [place] - [address]  [address] [address]  Phone: [phone] Fax: [phone]    [id number] - [place], [place] - [address] [address]  Phone: [phone] Fax: [phone]    [id number] - [place] - [address] [address]  Phone: [phone] Fax: [phone]    [id number] - [place] - [address]  [address] [facility] [address]  Phone: [phone] Fax: [phone]    [facility] [address]  [address]  [address] [place], [place] [address]  Phone: [phone] Fax: [phone]    Primary Care Provider: NANY Pollock    Primary Insurance: [employer] [facility]  Secondary Insurance:     PROGRESS NOTE:    Patient requesting to speak with this writer re: d/c plan. Met with patient at bedside and reviewed therapy recommendations and conversation with daughter from yesterday. Patient confirms that she's unable to return home with her son and relays agreement with going to rehab from the hospital. Reviewed, at length, limitations to rehab facilities secondary to insurance coverage (currently with [employer]) and outlined different levels of care between facilities - independent living, assisted/personal care, SNF. Patient did relay preference for [facility] if able to offer, but reviewed that [facility] may not be an option due to availability/insurance coverage. Reviewed facilities that had offered a bed - [facility] and  [facility].     Call made to [facility] and  left for admissions. Response then received from them relaying that they are unable to offer a bed for patient at this time.     Call made to patient's daughter, Dr. Venice Baires, and reviewed above. Daughter aware that only current options at this time are those listed above. Encouraged daughter to reach out to [facility] admission team to discuss getting patient on waitlist, and that she can follow-up with next skilled facility re: transition to long-term care, changing coverage back to traditional Medicare, etc.     Options discussed and daughter confirmed agreement with transfer to [facility] and aware that patient is medically stable for d/c at this time. Daughter in agreement for transfer once bed available at [facility]- and aware this writer to be in touch re: same.     [facility] confirmed in [facility] and message sent to inquire about when they can accept; awaiting response.

## 2024-01-16 NOTE — ASSESSMENT & PLAN NOTE
Blood Pressure: (!) 187/77  Likely in setting of medical noncompliance    Plan:  Continue home metoprolol succinate 50 Mg twice daily  Continue to monitor, consider adding additional antihypertensive medication if needed  Can consider as needed hydralazine if needed for uncontrolled hypertension

## 2024-01-16 NOTE — PLAN OF CARE
Problem: Potential for Falls  Goal: Patient will remain free of falls  Description: INTERVENTIONS:  - Educate patient/family on patient safety including physical limitations  - Instruct patient to call for assistance with activity   - Consult OT/PT to assist with strengthening/mobility   - Keep Call bell within reach  - Keep bed low and locked with side rails adjusted as appropriate  - Keep care items and personal belongings within reach  - Initiate and maintain comfort rounds  - Make Fall Risk Sign visible to staff  - Apply yellow socks and bracelet for high fall risk patients  - Consider moving patient to room near nurses station  Outcome: Progressing     Problem: Prexisting or High Potential for Compromised Skin Integrity  Goal: Skin integrity is maintained or improved  Description: INTERVENTIONS:  - Identify patients at risk for skin breakdown  - Assess and monitor skin integrity  - Assess and monitor nutrition and hydration status  - Monitor labs   - Assess for incontinence   - Turn and reposition patient  - Assist with mobility/ambulation  - Relieve pressure over bony prominences  - Avoid friction and shearing  - Provide appropriate hygiene as needed including keeping skin clean and dry  - Evaluate need for skin moisturizer/barrier cream  - Collaborate with interdisciplinary team   - Patient/family teaching  - Consider wound care consult   Outcome: Progressing     Problem: PAIN - ADULT  Goal: Verbalizes/displays adequate comfort level or baseline comfort level  Description: Interventions:  - Encourage patient to monitor pain and request assistance  - Assess pain using appropriate pain scale  - Administer analgesics based on type and severity of pain and evaluate response  - Implement non-pharmacological measures as appropriate and evaluate response  - Consider cultural and social influences on pain and pain management  - Notify physician/advanced practitioner if interventions unsuccessful or patient reports  new pain  Outcome: Progressing     Problem: INFECTION - ADULT  Goal: Absence or prevention of progression during hospitalization  Description: INTERVENTIONS:  - Assess and monitor for signs and symptoms of infection  - Monitor lab/diagnostic results  - Monitor all insertion sites, i.e. indwelling lines, tubes, and drains  - Monitor endotracheal if appropriate and nasal secretions for changes in amount and color  - [place] appropriate cooling/warming therapies per order  - Administer medications as ordered  - Instruct and encourage patient and family to use good hand hygiene technique  - Identify and instruct in appropriate isolation precautions for identified infection/condition  Outcome: Progressing  Goal: Absence of fever/infection during neutropenic period  Description: INTERVENTIONS:  - Monitor WBC    Outcome: Progressing     Problem: SAFETY ADULT  Goal: Patient will remain free of falls  Description: INTERVENTIONS:  - Educate patient/family on patient safety including physical limitations  - Instruct patient to call for assistance with activity   - Consult OT/PT to assist with strengthening/mobility   - Keep Call bell within reach  - Keep bed low and locked with side rails adjusted as appropriate  - Keep care items and personal belongings within reach  - Initiate and maintain comfort rounds  - Make Fall Risk Sign visible to staff  - Apply yellow socks and bracelet for high fall risk patients  - Consider moving patient to room near nurses station  Outcome: Progressing  Goal: Maintain or return to baseline ADL function  Description: INTERVENTIONS:  -  Assess patient's ability to carry out ADLs; assess patient's baseline for ADL function and identify physical deficits which impact ability to perform ADLs (bathing, care of mouth/teeth, toileting, grooming, dressing, etc.)  - Assess/evaluate cause of self-care deficits   - Assess range of motion  - Assess patient's mobility; develop plan if impaired  - Assess  patient's need for assistive devices and provide as appropriate  - Encourage maximum independence but intervene and supervise when necessary  - Involve family in performance of ADLs  - Assess for home care needs following discharge   - Consider OT consult to assist with ADL evaluation and planning for discharge  - Provide patient education as appropriate  Outcome: Progressing  Goal: Maintains/Returns to pre admission functional level  Description: INTERVENTIONS:  - Perform AM-PAC 6 Click Basic Mobility/ Daily Activity assessment daily.  - Set and communicate daily mobility goal to care team and patient/family/caregiver.   - Collaborate with rehabilitation services on mobility goals if consulted  - Out of bed for toileting  - Record patient progress and toleration of activity level   Outcome: Progressing     Problem: DISCHARGE PLANNING  Goal: Discharge to home or other facility with appropriate resources  Description: INTERVENTIONS:  - Identify barriers to discharge w/patient and caregiver  - Arrange for needed discharge resources and transportation as appropriate  - Identify discharge learning needs (meds, wound care, etc.)  - Arrange for interpretive services to assist at discharge as needed  - Refer to Case Management Department for coordinating discharge planning if the patient needs post-hospital services based on physician/advanced practitioner order or complex needs related to functional status, cognitive ability, or social support system  Outcome: Progressing     Problem: Knowledge Deficit  Goal: Patient/family/caregiver demonstrates understanding of disease process, treatment plan, medications, and discharge instructions  Description: Complete learning assessment and assess knowledge base.  Interventions:  - Provide teaching at level of understanding  - Provide teaching via preferred learning methods  Outcome: Progressing     Problem: Nutrition/Hydration-ADULT  Goal: Nutrient/Hydration intake appropriate for  improving, restoring or maintaining nutritional needs  Description: Monitor and assess patient's nutrition/hydration status for malnutrition. Collaborate with interdisciplinary team and initiate plan and interventions as ordered.  Monitor patient's weight and dietary intake as ordered or per policy. Utilize nutrition screening tool and intervene as necessary. Determine patient's food preferences and provide high-protein, high-caloric foods as appropriate.     INTERVENTIONS:  - Monitor oral intake, urinary output, labs, and treatment plans  - Assess nutrition and hydration status and recommend course of action  - Evaluate amount of meals eaten  - Assist patient with eating if necessary   - Allow adequate time for meals  - Recommend/ encourage appropriate diets, oral nutritional supplements, and vitamin/mineral supplements  - Order, calculate, and assess calorie counts as needed  - Recommend, monitor, and adjust tube feedings and TPN/PPN based on assessed needs  - Assess need for intravenous fluids  - Provide specific nutrition/hydration education as appropriate  - Include patient/family/caregiver in decisions related to nutrition  Outcome: Progressing

## 2024-01-16 NOTE — ASSESSMENT & PLAN NOTE
Patient with abnormal urinalysis growing Enterococcus faecalis on 1/10  History of recurrent UTI with drug-resistant organisms  Was not treated on 1/10 given MDRO history pending sensitivities  Reports dysuria and flank pain  Urinalysis 1/13 clean                                                    Ampicillin                 <=2.00 ug/ml   Susceptible    Levofloxacin   <=1.00 ug/ml Susceptible    Nitrofurantoin  <=32 ug/ml Susceptible    Tetracycline  >8 ug/ml Resistant    Vancomycin   1.00 ug/ml Susceptible        Patient is allergic to ampicillin and cephalosporins    Plan:  Patient received 3 days of vancomycin, given insufficient colony formation number, will discontinue vancomycin  1 day Pyridium 1/16

## 2024-01-16 NOTE — PROGRESS NOTES
[facility]  Progress Note  Name: Mattie Varela I  MRN: [id number]  Unit/Bed#: S -01 I Date of Admission: 1/13/2024   Date of Service: 1/16/2024 I Hospital Day: 2    Assessment/Plan   * AMS (altered mental status)  Assessment & Plan  Patient with altered mental status since evening of 1/12  In context of UTI  Patient's baseline allows for a normal conversation with attention and thought process per family  Denies recent injury, absent of focal neurologic defect  Complicated by likely medical noncompliance in recent days  Possible urinary retention  Significant back pain reported 10/10 - unclear contribution of chronic back pain vs acute flank pain    Patient is high risk of delirium  Acute encephalopathy resolved    Plan:  Initiate delirium precautions  Maintain normal sleep/wake cycle  Minimize overnight interruptions, group overnight vitals/labs/nursing checks as possible  Dim lights, close blinds and turn off tv to minimize stimulation and encourage sleep environment in evenings  Ensure that pain is well controlled; Tylenol 975mg Q8H scheduled  Monitor for fecal and urinary retention which may precipitate delirium, initiated to urinary retention protocol  Encourage early mobilization and ambulation  Provide frequent reorientation and redirection  Encourage family and friends at the bedside to help help calm patient if anxious  Avoid medications which may precipitate or worsen delirium such as tramadol, benzodiazepine, anticholinergics, and benadryl  Encourage hydration and nutrition   Redirect unwanted behaviors as first line, avoid physical restraints, use chemical restraint only if all other attempts have been Unsuccessful, would consider Zyprexa 2.5mg IM Q, monitor for orthostatic hypotension and QTc prolongation with repeat dosing, recommend lowest dose possible for shortest duration possible       Treatment for UTI as noted under abnormal urinalysis  Continued patient's home  meds with the exception of opiates and muscarinic antagonist  No need for head imaging at this time given low suspicion for intracranial abnormality      Abnormal urinalysis  Assessment & Plan  Patient with abnormal urinalysis growing Enterococcus faecalis on 1/10  History of recurrent UTI with drug-resistant organisms  Was not treated on 1/10 given MDRO history pending sensitivities  Reports dysuria and flank pain  Urinalysis 1/13 clean                                                    Ampicillin                 <=2.00 ug/ml   Susceptible    Levofloxacin   <=1.00 ug/ml Susceptible    Nitrofurantoin  <=32 ug/ml Susceptible    Tetracycline  >8 ug/ml Resistant    Vancomycin   1.00 ug/ml Susceptible        Patient is allergic to ampicillin and cephalosporins    Plan:  Patient received 3 days of vancomycin, given insufficient colony formation number, will discontinue vancomycin  1 day Pyridium 1/16    Acute kidney injury (HCC)-resolved as of 1/16/2024  Assessment & Plan  Recent Labs     01/14/24  0710 01/15/24  0513 01/16/24  0445   CREATININE 1.08 0.97 0.95   EGFR 48 55 56       Estimated Creatinine Clearance: 41.7 mL/min (by C-G formula based on SCr of 0.95 mg/dL).    Baseline creatinine appears to be 1-1.2  Per chart review patient seems to have frequent SADAF's  S/p 1 L normal saline in ED  Per family, patient has been refusing p.o. intake for several days and retaining urine  Lack of sis azotemia    Suspected etiology: Dehydration  Currently resolved    Plan:  Strict I's and O's  Urinary retention protocol    Hypertensive urgency  Assessment & Plan  Blood Pressure: (!) 187/77  Likely in setting of medical noncompliance    Plan:  Continue home metoprolol succinate 50 Mg twice daily  Continue to monitor, consider adding additional antihypertensive medication if needed  Can consider as needed hydralazine if needed for uncontrolled hypertension    Hypothyroidism  Assessment & Plan  Lab Results   Component Value Date     [id number] 3.899 11/26/2023     Continue home levothyroxine 37.5 mcg  Mind that the patient may have recently been noncompliant, TSH out of limits may not indicate need for dose change    Type 2 diabetes mellitus with other skin complications (HCC)  Assessment & Plan  Lab Results   Component Value Date    HGBA1C 6.5 (H) 11/26/2023       Recent Labs     01/15/24  1515 01/15/24  2019 01/16/24  0837 01/16/24  1126   POCGLU 184* 144* 132 215*         Blood Sugar Average: Last 72 hrs:  (P) 179.4186635647077015    Hold home metformin  Insulin sliding scale  Hypoglycemic protocol    Cerebrovascular accident (CVA), unspecified mechanism (HCC)  Assessment & Plan  History of CVA in addition to CAD    Plan:  Continue home Plavix and statin    Iron deficiency anemia secondary to inadequate dietary iron intake  Assessment & Plan  Recent Labs     01/14/24  0558 01/15/24  0513 01/16/24  0445   HGB 8.7* 8.1* 8.4*       Continue home ferrous sulfate 325 mg daily    Encounter for examination for admission to nursing home  Assessment & Plan  Currently lives with disabled son  Family reports medical noncompliance  Patient minimally ambulatory only able to transfer to bathroom  Significant medical need, unable to provide for self  Family report that they feel unable to provide patient with necessary care at home any longer, request placement after this admission    Plan:  Consult to case management, awaiting PT/OT recommendations    Moderate protein-calorie malnutrition (HCC)  Assessment & Plan  Malnutrition Findings:   Adult Malnutrition type: Chronic illness  Adult Degree of Malnutrition: Malnutrition of moderate degree  Malnutrition Characteristics: Weight loss, Inadequate energy                  360 Statement: Chronic/moderate malnutrition r/t condition, as evidenced by intake meeting <75% estimated needs > 1 month, and 8% wt loss x 2 months (1/15/24: 157#, 11/26/23: 171#). Treatment: PO diet, pt reporting improvement in  appetite over past couple days and doesn't care for oral nutrition supplements    BMI Findings:           Body mass index is 30.78 kg/m².                  VTE Pharmacologic Prophylaxis: VTE Score: 4 Moderate Risk (Score 3-4) - Pharmacological DVT Prophylaxis Ordered: heparin.    Mobility:   Basic Mobility Inpatient Raw Score: 13  -HLM Goal: 4: Move to chair/commode  JH-HLM Achieved: 3: Sit at edge of bed  HLM Goal NOT achieved. Continue with multidisciplinary rounding and encourage appropriate mobility to improve upon HLM goals.    Patient Centered Rounds: I performed bedside rounds with nursing staff today.  Discussions with Specialists or Other Care Team Provider: None    Education and Discussions with Family / Patient: Updated  (daughter) via phone.    Current Length of Stay: 2 day(s)  Current Patient Status: Inpatient   Discharge Plan: Anticipate discharge tomorrow to discharge location to be determined pending rehab evaluations.    Code Status: Level 3 - DNAR and DNI    Subjective:   Evaluated patient at bedside this morning, patient stated that she was feeling well despite having some chronic lower back pain.  We will evaluate her pain with x-ray. Otherwise mentating well, oriented x 3 and understands that she had UTI which is being treated.  Denying any fevers, lightheadedness, dizziness, nausea, vomiting, chest pains, abdominal pains.  Patient felt her hands and legs were swollen, asked for a low-dose Lasix, this will be provided and we will monitor renal response.    Objective:     Vitals:   No data recorded.    HR:  [82-90] 82  Resp:  [16] 16  BP: (160-187)/(73-77) 187/77  SpO2:  [96 %] 96 %  Body mass index is 30.78 kg/m².     Input and Output Summary (last 24 hours):     Intake/Output Summary (Last 24 hours) at 1/16/2024 1213  Last data filed at 1/16/2024 0853  Gross per 24 hour   Intake 200 ml   Output 475 ml   Net -275 ml       Physical Exam:   Physical Exam  Vitals and nursing note  reviewed.   Constitutional:       General: She is not in acute distress.     Appearance: She is well-developed. She is not toxic-appearing.   HENT:      Head: Normocephalic and atraumatic.   Eyes:      Conjunctiva/sclera: Conjunctivae normal.   Cardiovascular:      Rate and Rhythm: Normal rate. Rhythm irregular.      Heart sounds: Murmur heard.   Pulmonary:      Effort: Pulmonary effort is normal. No respiratory distress.      Breath sounds: Normal breath sounds.   Abdominal:      Palpations: Abdomen is soft.      Tenderness: There is no abdominal tenderness.   Musculoskeletal:      Cervical back: Neck supple.      Right lower leg: Edema present.      Left lower leg: Edema present.   Skin:     General: Skin is warm and dry.      Capillary Refill: Capillary refill takes less than 2 seconds.   Neurological:      Mental Status: She is alert and oriented to person, place, and time.      Comments: Patient tends to wax and wane, appears to be more oriented today than in the past   Psychiatric:         Mood and Affect: Mood normal.          Additional Data:     Labs:  Results from last 7 days   Lab Units 01/16/24  0445 01/15/24  0513 01/14/24  0558   WBC Thousand/uL 5.16   < > 7.11   HEMOGLOBIN g/dL 8.4*   < > 8.7*   HEMATOCRIT % 27.6*   < > 27.5*   PLATELETS Thousands/uL 239   < > 225   NEUTROS PCT %  --   --  72   LYMPHS PCT %  --   --  14   MONOS PCT %  --   --  13*   EOS PCT %  --   --  1    < > = values in this interval not displayed.     Results from last 7 days   Lab Units 01/16/24  0445 01/15/24  0513 01/14/24  0710   SODIUM mmol/L 137   < > 140   POTASSIUM mmol/L 4.5   < > 4.5   CHLORIDE mmol/L 105   < > 107   CO2 mmol/L 26   < > 25   BUN mg/dL 19   < > 22   CREATININE mg/dL 0.95   < > 1.08   ANION GAP mmol/L 6   < > 8   CALCIUM mg/dL 8.8   < > 7.6*   ALBUMIN g/dL  --   --  3.1*   TOTAL BILIRUBIN mg/dL  --   --  0.44   ALK PHOS U/L  --   --  58   ALT U/L  --   --  6*   AST U/L  --   --  11*   GLUCOSE RANDOM mg/dL  132   < > 130    < > = values in this interval not displayed.     Results from last 7 days   Lab Units 01/13/24  1354   INR  0.88     Results from last 7 days   Lab Units 01/16/24  1126 01/16/24  0837 01/15/24  2019 01/15/24  1515 01/15/24  1131 01/15/24  0730 01/14/24  2049 01/14/24  1511 01/14/24  1111 01/14/24  0733 01/14/24  0048 01/13/24  1804   POC GLUCOSE mg/dl 215* 132 144* 184* 206* 128 323* 163* 161* 118 148* 209*         Results from last 7 days   Lab Units 01/13/24  1354   LACTIC ACID mmol/L 1.1   PROCALCITONIN ng/ml 0.08       Lines/Drains:  Invasive Devices       Peripheral Intravenous Line  Duration             Peripheral IV 01/13/24 Left Antecubital 2 days    Peripheral IV 01/14/24 Distal;Right;Ventral (anterior) Forearm 2 days                          Imaging: Reviewed radiology reports from this admission including: chest xray    Recent Cultures (last 7 days):   Results from last 7 days   Lab Units 01/13/24  1438 01/13/24  1354 01/10/24  1847   BLOOD CULTURE  No Growth at 48 hrs. No Growth at 48 hrs.  --    URINE CULTURE   --   --  40,000-49,000 cfu/ml Enterococcus faecalis*  20,000-29,000 cfu/ml       Last 24 Hours Medication List:   Current Facility-Administered Medications   Medication Dose Route Frequency Provider Last Rate    acetaminophen  650 mg Oral Q8H [facility] Ray Velazco MD      albuterol  2 puff Inhalation Q6H PRN Zak Jones MD      amLODIPine  5 mg Oral Daily Ray Velazoc MD      aspirin  81 mg Oral Daily Zak Jones MD      atorvastatin  40 mg Oral Daily Zak Jones MD      baclofen  10 mg Oral BID PRN Zak Jones MD      clopidogrel  75 mg Oral Daily Zak Jones MD      ferrous sulfate  325 mg Oral Daily With Breakfast Zak Jones MD      heparin (porcine)  5,000 Units Subcutaneous Q8H ARY Zak Jones MD      insulin lispro  1-6 Units Subcutaneous TID AC Zak Jones MD      insulin lispro  1-6 Units Subcutaneous HS Zak Jones MD      levothyroxine   37.5 mcg Oral Early Morning Zak Jones MD      lidocaine  1 patch Topical Daily Zak Jones MD      melatonin  6 mg Oral HS Zak Jones MD      metoprolol succinate  50 mg Oral Q12H ARY Zak Jones MD      Milnacipran HCl  1 tablet Oral Daily Zak Jones MD      naloxone  0.04 mg Intravenous Q1MIN PRN Naveen Pittman MD      nystatin   Topical BID Naveen Pittman MD      ondansetron  4 mg Intravenous Q6H PRN Zak Jones MD      oxyCODONE-acetaminophen  1 tablet Oral Q4H PRN Ray Velazco MD      pantoprazole  40 mg Oral Daily Zak Jones MD      phenazopyridine  100 mg Oral TID With Meals Zak Jones MD      polyethylene glycol  17 g Oral Daily Zak Jones MD      ranolazine  500 mg Oral Q12H ARY Zak Jones MD      senna-docusate sodium  1 tablet Oral HS Zak Jones MD      sodium chloride  1 spray Each Nare Q1H PRN Alejandro Philip MD          Today, Patient Was Seen By: Zak Jones MD    **Please Note: This note may have been constructed using a voice recognition system.**

## 2024-01-16 NOTE — PROGRESS NOTES
Mattie Varela is a 78 y.o. female who is currently ordered Vancomycin IV with management by the Pharmacy Consult service.  Relevant clinical data and objective / subjective history reviewed.  Vancomycin Assessment:  Indication and Goal AUC/Trough: Urinary tract infection (goal -600, trough >10)  Clinical Status: stable  Micro:     Renal Function:  SCr: 0.97 mg/dL  CrCl: 36.1 mL/min  Renal replacement: Not on dialysis  Days of Therapy: 4  Current Dose: 1000 mg IV every 24 hours  Vancomycin Plan:  New Dosing: Continue current dosing  Estimated AUC: 414 mcg*hr/mL  Estimated Trough: 13.5 mcg/mL  Next Level: 1/21 at 0600  Renal Function Monitoring: Daily BMP and UOP  Pharmacy will continue to follow closely for s/sx of nephrotoxicity, infusion reactions and appropriateness of therapy.  BMP and CBC will be ordered per protocol. We will continue to follow the patient’s culture results and clinical progress daily.    Lashae Mason, Pharmacist

## 2024-01-16 NOTE — CASE MANAGEMENT
Case Management Discharge Planning Note    Patient name Mattie Varela  Location S /S -01 MRN [id number]  : 1945 Date 2024       Current Admission Date: 2024  Current Admission Diagnosis:AMS (altered mental status)   Patient Active Problem List    Diagnosis Date Noted    Moderate protein-calorie malnutrition (HCC) 2024    Hypertensive urgency 2024    AMS (altered mental status) 2024    Encounter for examination for admission to nursing home 2024    Stage 3a chronic kidney disease (HCC) 01/10/2024    Left ventricular outflow tract obstruction 2024    Obesity, Class I, BMI 30-34.9 2024    Urinary retention 2023    SADAF (acute kidney injury) ([facility]) 2023    Tremor 2023    Exertional shortness of breath 2023    Non obstructive CAD 11/15/2023    History of lumbar surgery 11/10/2023    Abnormal urinalysis 2023    Chronic obstructive pulmonary disease, unspecified COPD type ([facility]) 2023    Confusion with body shakes and blank stare 2023    Normocytic anemia 2023    Bilateral lower extremity edema 2023    Cerebrovascular accident (CVA), unspecified mechanism ([facility]) 2022    Stroke-like symptoms 2022    Prepyloric ulcer 2021    Diarrhea 2021    Mild intermittent asthma without complication 2020    S/P insertion of spinal cord stimulator 2018    Iron deficiency anemia secondary to inadequate dietary iron intake 2018    Type 2 diabetes mellitus with other skin complications ([facility])     Failed back surgical syndrome 2018    Hypothyroidism 2017    Degenerative lumbar spinal stenosis 2017    Glaucoma 2017    Overactive bladder 2016    Dyspepsia 2016    Hypercholesterolemia 2016    Anxiety 2015    Chronic pain disorder 2013    Hypertension 2013      LOS (days): 2  Geometric Mean LOS (GMLOS) (days): 3.1  Days to  GMLOS:0.8     OBJECTIVE:  Risk of Unplanned Readmission Score: 28.94         Current admission status: Inpatient   Preferred Pharmacy:   [id number] - [place] - [address]  [address]  [place] [address]  Phone: [phone] Fax: [phone]    [id number] - [place] - [address] [address] [facility] [place] [address]  Phone: [phone] Fax: [phone]    [id number] - [place] - [id number] [facility] [place] [address]  Phone: [phone] Fax: [phone]    [id number] - [place] - [address]  [address] [facility] [place] [address]  Phone: [phone] Fax: [phone]    [employer] [address]  [address]  [address]  [place] [address]  Phone: [phone] Fax: [phone]    Primary Care Provider: NANY Pollock    Primary Insurance: [facility]  Secondary Insurance:     DISCHARGE DETAILS:    Discharge planning discussed with:: patient's daughter, Dr. Baires, by phone  Freedom of Choice: Yes (re: SNF)  Comments - [place] of Choice: [facility] contacted family/caregiver?: Yes  Were Treatment Team discharge recommendations reviewed with patient/caregiver?: Yes  Did patient/caregiver verbalize understanding of patient care needs?: N/A- going to facility  Were patient/caregiver advised of the risks associated with not following Treatment Team discharge recommendations?: Yes    Contacts  Patient Contacts: daughter, Dr. Venice Baires  Relationship to Patient:: Family  Contact Method: Phone  Phone Number: [phone]  Reason/Outcome: Continuity of Care, Emergency Contact, Referral, Discharge Planning    Requested Home Health Care         Is the patient interested in HHC at discharge?: No    DME Referral Provided  Referral made for DME?: No    Other Referral/Resources/Interventions  Provided:  Interventions: SNF  Referral Comments: Confirmation received from [facility]) that they can bring patient in tomorrow, 1/17, for admission. Aware that transport being requested tonight for tomorrow. Transport requested in [facility] for 11:00am; TT received from [facility] at [facility] requesting 10:00am pick-up - MD confirmed agreement with same. Transport confirmed at this time for 10:00am with [facility] to [facility] for tomorrow, 1/17. Call made to [facility] ([phone]) and  left for Duarte - inbox message also sent to him to relay plan. Call made to patient's daughter, Dr. Baires, and reviewed plan for d/c tomorrow and transport scheduled at 10:00am; confirmed agreement with same. Met with patient at bedside and also reviewed d/c to [facility] for the morning. Provided copy of IMM for her records. Patient confirmed agreement with rehab transfer in AM.    Would you like to participate in our [facility] service program?  : No - Declined    Treatment Team Recommendation: SNF  Discharge Destination Plan:: SNF ([facility])  Transport at Discharge : [facility]  Dispatcher Contacted: Yes  Number/Name of Dispatcher: [facility]  Transported by (Company and Unit #): [facility]  ETA of Transport (Date): 01/17/24  ETA of Transport (Time): 1000 (confirmed)              IMM Given (Date):: 01/16/24  IMM Given to:: Patient          Accepting Facility Name, City & State : [facility]  Receiving Facility/Agency Phone Number: [phone]  Facility/Agency Fax Number: [phone]

## 2024-01-16 NOTE — DISCHARGE INSTR - AVS FIRST PAGE
Dear Mattie Varela,     It was our pleasure to care for you here at [facility].  It is our hope that we were always able to exceed the expected standards for your care during your stay.  You were hospitalized due to altered mental status.  You were cared for on the South third floor by Alejandro Philip MD under the service of Siomara Alvarado MD with the [facility]ist [facility] who covers for your primary care physician (PCP), NANY Pollock, while you were hospitalized.  If you have any questions or concerns related to this hospitalization, you may contact us at .  For follow up as well as any medication refills, we recommend that you follow up with your primary care physician.  A registered nurse will reach out to you by phone within a few days after your discharge to answer any additional questions that you may have after going home.  However, at this time we provide for you here, the most important instructions / recommendations at discharge:     Notable Medication Adjustments -   Begin taking amlodipine 5 mg daily  We have changed the dose of your levothyroxine, please take one half tab (37.5 Mcg) daily  Testing Required after Discharge -   None  ** Please contact your PCP to request testing orders for any of the testing recommended here **  Important follow up information -   Please follow-up with primary care provider as routine  Please establish care with orthopedics for treatment using the provided ambulatory referral to further evaluate your back pain  Other Instructions -   Please ensure medical compliance with all prescribed medications  Return to the hospital if you have new or worsening symptoms including fever or confusion  Please review this entire after visit summary as additional general instructions including medication list, appointments, activity, diet, any pertinent wound care, and other additional recommendations from your  care team that may be provided for you.      Sincerely,     Alejandro Philip MD

## 2024-01-16 NOTE — ASSESSMENT & PLAN NOTE
Malnutrition Findings:   Adult Malnutrition type: Chronic illness  Adult Degree of Malnutrition: Malnutrition of moderate degree  Malnutrition Characteristics: Weight loss, Inadequate energy                  360 Statement: Chronic/moderate malnutrition r/t condition, as evidenced by intake meeting <75% estimated needs > 1 month, and 8% wt loss x 2 months (1/15/24: 157#, 11/26/23: 171#). Treatment: PO diet, pt reporting improvement in appetite over past couple days and doesn't care for oral nutrition supplements    BMI Findings:           Body mass index is 30.78 kg/m².

## 2024-01-16 NOTE — ASSESSMENT & PLAN NOTE
Recent Labs     01/14/24  0558 01/15/24  0513 01/16/24  0445   HGB 8.7* 8.1* 8.4*       Continue home ferrous sulfate 325 mg daily

## 2024-01-16 NOTE — ASSESSMENT & PLAN NOTE
Lab Results   Component Value Date    [id number] 3.899 11/26/2023     Continue home levothyroxine 37.5 mcg  Mind that the patient may have recently been noncompliant, TSH out of limits may not indicate need for dose change

## 2024-01-16 NOTE — PROGRESS NOTES
Request received from PCP for Outpatient Social Work Care Manager (OP SWCM) to outreach patient's dtr (Dr. Venice Baires - on communication consent form) and assist with changing Medicare plan as they are no longer interested in patient remaining with [facility].  They are also needing SNF placement for patient.    Per chart review, patient currently admitted to [facility].  IPCM working on SNF referrals.  Patient's family's preference is [facility]; awaiting determination if they are able to accept patient for LTC.  Patient's insurance will change to traditional Medicare on 2/1/2024.    Message sent to patient's dtr to inquire if OP SWCM can assist with additional needs while patient is admitted and receiving assistance from IPCMs regarding discharge plan.  Awaiting response.

## 2024-01-16 NOTE — ASSESSMENT & PLAN NOTE
Lab Results   Component Value Date    HGBA1C 6.5 (H) 11/26/2023       Recent Labs     01/15/24  1515 01/15/24  2019 01/16/24  0837 01/16/24  1126   POCGLU 184* 144* 132 215*         Blood Sugar Average: Last 72 hrs:  (P) 179.2856905365071387    Hold home metformin  Insulin sliding scale  Hypoglycemic protocol

## 2024-01-17 VITALS
TEMPERATURE: 98.4 F | OXYGEN SATURATION: 94 % | HEART RATE: 76 BPM | WEIGHT: 159.4 LBS | DIASTOLIC BLOOD PRESSURE: 63 MMHG | BODY MASS INDEX: 31.29 KG/M2 | RESPIRATION RATE: 18 BRPM | HEIGHT: 60 IN | SYSTOLIC BLOOD PRESSURE: 134 MMHG

## 2024-01-17 LAB
ANION GAP SERPL CALCULATED.3IONS-SCNC: 6 MMOL/L
BASOPHILS # BLD AUTO: 0.03 THOUSANDS/ÂΜL (ref 0–0.1)
BASOPHILS NFR BLD AUTO: 1 % (ref 0–1)
BUN SERPL-MCNC: 16 MG/DL (ref 5–25)
CALCIUM SERPL-MCNC: 8.7 MG/DL (ref 8.4–10.2)
CHLORIDE SERPL-SCNC: 105 MMOL/L (ref 96–108)
CO2 SERPL-SCNC: 26 MMOL/L (ref 21–32)
CREAT SERPL-MCNC: 0.99 MG/DL (ref 0.6–1.3)
EOSINOPHIL # BLD AUTO: 0.22 THOUSAND/ÂΜL (ref 0–0.61)
EOSINOPHIL NFR BLD AUTO: 5 % (ref 0–6)
ERYTHROCYTE [DISTWIDTH] IN BLOOD BY AUTOMATED COUNT: 14.9 % (ref 11.6–15.1)
GFR SERPL CREATININE-BSD FRML MDRD: 53 ML/MIN/1.73SQ M
GLUCOSE SERPL-MCNC: 115 MG/DL (ref 65–140)
GLUCOSE SERPL-MCNC: 123 MG/DL (ref 65–140)
HCT VFR BLD AUTO: 26.6 % (ref 34.8–46.1)
HGB BLD-MCNC: 8.2 G/DL (ref 11.5–15.4)
IMM GRANULOCYTES # BLD AUTO: 0.01 THOUSAND/UL (ref 0–0.2)
IMM GRANULOCYTES NFR BLD AUTO: 0 % (ref 0–2)
LYMPHOCYTES # BLD AUTO: 1.39 THOUSANDS/ÂΜL (ref 0.6–4.47)
LYMPHOCYTES NFR BLD AUTO: 31 % (ref 14–44)
MAGNESIUM SERPL-MCNC: 1.3 MG/DL (ref 1.9–2.7)
MCH RBC QN AUTO: 29.9 PG (ref 26.8–34.3)
MCHC RBC AUTO-ENTMCNC: 30.8 G/DL (ref 31.4–37.4)
MCV RBC AUTO: 97 FL (ref 82–98)
MONOCYTES # BLD AUTO: 0.53 THOUSAND/ÂΜL (ref 0.17–1.22)
MONOCYTES NFR BLD AUTO: 12 % (ref 4–12)
NEUTROPHILS # BLD AUTO: 2.36 THOUSANDS/ÂΜL (ref 1.85–7.62)
NEUTS SEG NFR BLD AUTO: 51 % (ref 43–75)
NRBC BLD AUTO-RTO: 0 /100 WBCS
PLATELET # BLD AUTO: 262 THOUSANDS/UL (ref 149–390)
PMV BLD AUTO: 10.6 FL (ref 8.9–12.7)
POTASSIUM SERPL-SCNC: 4.4 MMOL/L (ref 3.5–5.3)
RBC # BLD AUTO: 2.74 MILLION/UL (ref 3.81–5.12)
SODIUM SERPL-SCNC: 137 MMOL/L (ref 135–147)
WBC # BLD AUTO: 4.54 THOUSAND/UL (ref 4.31–10.16)

## 2024-01-17 PROCEDURE — 85025 COMPLETE CBC W/AUTO DIFF WBC: CPT

## 2024-01-17 PROCEDURE — 99238 HOSP IP/OBS DSCHRG MGMT 30/<: CPT | Performed by: HOSPITALIST

## 2024-01-17 PROCEDURE — 80048 BASIC METABOLIC PNL TOTAL CA: CPT

## 2024-01-17 PROCEDURE — 83735 ASSAY OF MAGNESIUM: CPT

## 2024-01-17 PROCEDURE — 82948 REAGENT STRIP/BLOOD GLUCOSE: CPT

## 2024-01-17 RX ORDER — AMLODIPINE BESYLATE 5 MG/1
5 TABLET ORAL DAILY
Start: 2024-01-17 | End: 2024-02-16

## 2024-01-17 RX ORDER — LEVOTHYROXINE SODIUM 0.07 MG/1
37.5 TABLET ORAL
Start: 2024-01-17 | End: 2024-02-16

## 2024-01-17 RX ADMIN — ATORVASTATIN CALCIUM 40 MG: 40 TABLET, FILM COATED ORAL at 09:05

## 2024-01-17 RX ADMIN — PHENAZOPYRIDINE 100 MG: 100 TABLET ORAL at 09:05

## 2024-01-17 RX ADMIN — LIDOCAINE 5% 1 PATCH: 700 PATCH TOPICAL at 09:05

## 2024-01-17 RX ADMIN — MILNACIPRAN HYDROCHLORIDE 100 MG: 100 TABLET, FILM COATED ORAL at 09:06

## 2024-01-17 RX ADMIN — ASPIRIN 81 MG CHEWABLE TABLET 81 MG: 81 TABLET CHEWABLE at 09:05

## 2024-01-17 RX ADMIN — FERROUS SULFATE TAB 325 MG (65 MG ELEMENTAL FE) 325 MG: 325 (65 FE) TAB at 09:05

## 2024-01-17 RX ADMIN — CLOPIDOGREL BISULFATE 75 MG: 75 TABLET ORAL at 09:05

## 2024-01-17 RX ADMIN — ACETAMINOPHEN 650 MG: 325 TABLET, FILM COATED ORAL at 05:00

## 2024-01-17 RX ADMIN — METOPROLOL SUCCINATE 50 MG: 50 TABLET, EXTENDED RELEASE ORAL at 09:05

## 2024-01-17 RX ADMIN — AMLODIPINE BESYLATE 5 MG: 5 TABLET ORAL at 09:05

## 2024-01-17 RX ADMIN — HEPARIN SODIUM 5000 UNITS: 5000 INJECTION INTRAVENOUS; SUBCUTANEOUS at 05:00

## 2024-01-17 RX ADMIN — PANTOPRAZOLE SODIUM 40 MG: 40 TABLET, DELAYED RELEASE ORAL at 09:05

## 2024-01-17 RX ADMIN — LEVOTHYROXINE SODIUM 37.5 MCG: 75 TABLET ORAL at 05:00

## 2024-01-17 NOTE — ASSESSMENT & PLAN NOTE
Patient with abnormal urinalysis growing Enterococcus faecalis on 1/10  History of recurrent UTI with drug-resistant organisms  Was not treated on 1/10 given MDRO history pending sensitivities  Reports dysuria and flank pain  Urinalysis 1/13 clean                                                    Ampicillin                 <=2.00 ug/ml   Susceptible    Levofloxacin   <=1.00 ug/ml Susceptible    Nitrofurantoin  <=32 ug/ml Susceptible    Tetracycline  >8 ug/ml Resistant    Vancomycin   1.00 ug/ml Susceptible        Patient is allergic to ampicillin and cephalosporins    Plan:  Patient received 3 days of vancomycin, given insufficient colony formation number, will discontinue vancomycin  1 day Pyridium 1/16  Resolved

## 2024-01-17 NOTE — ASSESSMENT & PLAN NOTE
Patient with altered mental status since evening of 1/12  In context of UTI  Patient's baseline allows for a normal conversation with attention and thought process per family  Denies recent injury, absent of focal neurologic defect  Complicated by likely medical noncompliance in recent days  Possible urinary retention  Significant back pain reported 10/10 - unclear contribution of chronic back pain vs acute flank pain    Patient is high risk of delirium  Acute encephalopathy resolved    Plan:  Resolved  Patient likely with baseline dementia  Discharging to SNF

## 2024-01-17 NOTE — PLAN OF CARE
Problem: Potential for Falls  Goal: Patient will remain free of falls  Description: INTERVENTIONS:  - Educate patient/family on patient safety including physical limitations  - Instruct patient to call for assistance with activity   - Consult OT/PT to assist with strengthening/mobility   - Keep Call bell within reach  - Keep bed low and locked with side rails adjusted as appropriate  - Keep care items and personal belongings within reach  - Initiate and maintain comfort rounds  - Make Fall Risk Sign visible to staff  - Offer Toileting every  Hours, in advance of need  - Initiate/Maintain alarm  - Obtain necessary fall risk management equipment:   - Apply yellow socks and bracelet for high fall risk patients  - Consider moving patient to room near nurses station  Outcome: Progressing     Problem: Prexisting or High Potential for Compromised Skin Integrity  Goal: Skin integrity is maintained or improved  Description: INTERVENTIONS:  - Identify patients at risk for skin breakdown  - Assess and monitor skin integrity  - Assess and monitor nutrition and hydration status  - Monitor labs   - Assess for incontinence   - Turn and reposition patient  - Assist with mobility/ambulation  - Relieve pressure over bony prominences  - Avoid friction and shearing  - Provide appropriate hygiene as needed including keeping skin clean and dry  - Evaluate need for skin moisturizer/barrier cream  - Collaborate with interdisciplinary team   - Patient/family teaching  - Consider wound care consult   Outcome: Progressing     Problem: PAIN - ADULT  Goal: Verbalizes/displays adequate comfort level or baseline comfort level  Description: Interventions:  - Encourage patient to monitor pain and request assistance  - Assess pain using appropriate pain scale  - Administer analgesics based on type and severity of pain and evaluate response  - Implement non-pharmacological measures as appropriate and evaluate response  - Consider cultural and  social influences on pain and pain management  - Notify physician/advanced practitioner if interventions unsuccessful or patient reports new pain  Outcome: Progressing     Problem: INFECTION - ADULT  Goal: Absence or prevention of progression during hospitalization  Description: INTERVENTIONS:  - Assess and monitor for signs and symptoms of infection  - Monitor lab/diagnostic results  - Monitor all insertion sites, i.e. indwelling lines, tubes, and drains  - Monitor endotracheal if appropriate and nasal secretions for changes in amount and color  - [place] appropriate cooling/warming therapies per order  - Administer medications as ordered  - Instruct and encourage patient and family to use good hand hygiene technique  - Identify and instruct in appropriate isolation precautions for identified infection/condition  Outcome: Progressing  Goal: Absence of fever/infection during neutropenic period  Description: INTERVENTIONS:  - Monitor WBC    Outcome: Progressing     Problem: SAFETY ADULT  Goal: Patient will remain free of falls  Description: INTERVENTIONS:  - Educate patient/family on patient safety including physical limitations  - Instruct patient to call for assistance with activity   - Consult OT/PT to assist with strengthening/mobility   - Keep Call bell within reach  - Keep bed low and locked with side rails adjusted as appropriate  - Keep care items and personal belongings within reach  - Initiate and maintain comfort rounds  - Make Fall Risk Sign visible to staff  - Offer Toileting every  Hours, in advance of need  - Initiate/Maintain alarm  - Obtain necessary fall risk management equipment:  - Apply yellow socks and bracelet for high fall risk patients  - Consider moving patient to room near nurses station  Outcome: Progressing  Goal: Maintain or return to baseline ADL function  Description: INTERVENTIONS:  -  Assess patient's ability to carry out ADLs; assess patient's baseline for ADL function and  identify physical deficits which impact ability to perform ADLs (bathing, care of mouth/teeth, toileting, grooming, dressing, etc.)  - Assess/evaluate cause of self-care deficits   - Assess range of motion  - Assess patient's mobility; develop plan if impaired  - Assess patient's need for assistive devices and provide as appropriate  - Encourage maximum independence but intervene and supervise when necessary  - Involve family in performance of ADLs  - Assess for home care needs following discharge   - Consider OT consult to assist with ADL evaluation and planning for discharge  - Provide patient education as appropriate  Outcome: Progressing  Goal: Maintains/Returns to pre admission functional level  Description: INTERVENTIONS:  - Perform AM-PAC 6 Click Basic Mobility/ Daily Activity assessment daily.  - Set and communicate daily mobility goal to care team and patient/family/caregiver.   - Collaborate with rehabilitation services on mobility goals if consulted  - Perform Range of Motion  times a day.  - Reposition patient every  hours.  - Dangle patient  times a day  - Stand patient  times a day  - Ambulate patient  times a day  - Out of bed to chair times a day   - Out of bed for meals  times a day  - Out of bed for toileting  - Record patient progress and toleration of activity level   Outcome: Progressing     Problem: DISCHARGE PLANNING  Goal: Discharge to home or other facility with appropriate resources  Description: INTERVENTIONS:  - Identify barriers to discharge w/patient and caregiver  - Arrange for needed discharge resources and transportation as appropriate  - Identify discharge learning needs (meds, wound care, etc.)  - Arrange for interpretive services to assist at discharge as needed  - Refer to Case Management Department for coordinating discharge planning if the patient needs post-hospital services based on physician/advanced practitioner order or complex needs related to functional status, cognitive  ability, or social support system  Outcome: Progressing     Problem: Knowledge Deficit  Goal: Patient/family/caregiver demonstrates understanding of disease process, treatment plan, medications, and discharge instructions  Description: Complete learning assessment and assess knowledge base.  Interventions:  - Provide teaching at level of understanding  - Provide teaching via preferred learning methods  Outcome: Progressing     Problem: Nutrition/Hydration-ADULT  Goal: Nutrient/Hydration intake appropriate for improving, restoring or maintaining nutritional needs  Description: Monitor and assess patient's nutrition/hydration status for malnutrition. Collaborate with interdisciplinary team and initiate plan and interventions as ordered.  Monitor patient's weight and dietary intake as ordered or per policy. Utilize nutrition screening tool and intervene as necessary. Determine patient's food preferences and provide high-protein, high-caloric foods as appropriate.     INTERVENTIONS:  - Monitor oral intake, urinary output, labs, and treatment plans  - Assess nutrition and hydration status and recommend course of action  - Evaluate amount of meals eaten  - Assist patient with eating if necessary   - Allow adequate time for meals  - Recommend/ encourage appropriate diets, oral nutritional supplements, and vitamin/mineral supplements  - Order, calculate, and assess calorie counts as needed  - Recommend, monitor, and adjust tube feedings and TPN/PPN based on assessed needs  - Assess need for intravenous fluids  - Provide specific nutrition/hydration education as appropriate  - Include patient/family/caregiver in decisions related to nutrition  Outcome: Progressing

## 2024-01-17 NOTE — ASSESSMENT & PLAN NOTE
Lab Results   Component Value Date    HGBA1C 6.5 (H) 11/26/2023       Recent Labs     01/16/24  1126 01/16/24  1630 01/16/24 2026 01/17/24  0754   POCGLU 215* 134 117 115         Blood Sugar Average: Last 72 hrs:  (P) [id number]    Okay to restart home regimen

## 2024-01-17 NOTE — ASSESSMENT & PLAN NOTE
Malnutrition Findings:   Adult Malnutrition type: Chronic illness  Adult Degree of Malnutrition: Malnutrition of moderate degree  Malnutrition Characteristics: Weight loss, Inadequate energy                  360 Statement: Chronic/moderate malnutrition r/t condition, as evidenced by intake meeting <75% estimated needs > 1 month, and 8% wt loss x 2 months (1/15/24: 157#, 11/26/23: 171#). Treatment: PO diet, pt reporting improvement in appetite over past couple days and doesn't care for oral nutrition supplements    BMI Findings:           Body mass index is 31.13 kg/m².

## 2024-01-17 NOTE — ASSESSMENT & PLAN NOTE
Recent Labs     01/15/24  0513 01/16/24  0445 01/17/24  0529   HGB 8.1* 8.4* 8.2*       Continue home ferrous sulfate 325 mg daily

## 2024-01-17 NOTE — ASSESSMENT & PLAN NOTE
Blood Pressure: 134/63  Likely in setting of medical noncompliance    Plan:  Continue home metoprolol succinate 50 Mg twice daily

## 2024-01-17 NOTE — ASSESSMENT & PLAN NOTE
Currently lives with disabled son  Family reports medical noncompliance  Patient minimally ambulatory only able to transfer to bathroom  Significant medical need, unable to provide for self  Family report that they feel unable to provide patient with necessary care at home any longer, request placement after this admission    Plan:  Discouraged to skilled nursing facility

## 2024-01-17 NOTE — CASE MANAGEMENT
Case Management Discharge Planning Note    Patient name Mattie Varela  Location S /S -01 MRN [id number]  : 1945 Date 2024       Current Admission Date: 2024  Current Admission Diagnosis:AMS (altered mental status)   Patient Active Problem List    Diagnosis Date Noted    Moderate protein-calorie malnutrition (HCC) 2024    Hypertensive urgency 2024    AMS (altered mental status) 2024    Encounter for examination for admission to nursing home 2024    Stage 3a chronic kidney disease (HCC) 01/10/2024    Left ventricular outflow tract obstruction 2024    Obesity, Class I, BMI 30-34.9 2024    Urinary retention 2023    SADAF (acute kidney injury) ([facility]) 2023    Tremor 2023    Exertional shortness of breath 2023    Non obstructive CAD 11/15/2023    History of lumbar surgery 11/10/2023    Abnormal urinalysis 2023    Chronic obstructive pulmonary disease, unspecified COPD type ([facility]) 2023    Confusion with body shakes and blank stare 2023    Normocytic anemia 2023    Bilateral lower extremity edema 2023    Cerebrovascular accident (CVA), unspecified mechanism ([facility]) 2022    Stroke-like symptoms 2022    Prepyloric ulcer 2021    Diarrhea 2021    Mild intermittent asthma without complication 2020    S/P insertion of spinal cord stimulator 2018    Iron deficiency anemia secondary to inadequate dietary iron intake 2018    Type 2 diabetes mellitus with other skin complications ([facility])     Failed back surgical syndrome 2018    Hypothyroidism 2017    Degenerative lumbar spinal stenosis 2017    Glaucoma 2017    Overactive bladder 2016    Dyspepsia 2016    Hypercholesterolemia 2016    Anxiety 2015    Chronic pain disorder 2013    Hypertension 2013      LOS (days): 3  Geometric Mean LOS (GMLOS) (days): 4.4  Days to  GMLOS:1.4     OBJECTIVE:  Risk of Unplanned Readmission Score: 29.18         Current admission status: Inpatient   Preferred Pharmacy:   [id number] - [place] - [address]  [address]  [place] [address]  Phone: [phone] Fax: [phone]    [id number] - [place] - [address] [facility] [place] [address]  Phone: [phone] Fax: [phone]    [id number] - [place] - [address] [facility] [place] [address]  Phone: [phone] Fax: [phone]    [id number] - [place] - [address]  [address] [facility] [place] [address]  Phone: [phone] Fax: [phone]    [facility] [place] - [address]  [address]  [address]  [place] [address]  Phone: [phone] Fax: [phone]    Primary Care Provider: NANY Pollock    Primary Insurance: [facility]  Secondary Insurance:     DISCHARGE DETAILS:    Discharge planning discussed with:: patient's daughter, Dr. Baires, by phone; Duarte at [facility]  [place] of Choice: Yes (re: SNF)  Comments - [place] of Choice: [facility] (2029)  CM contacted family/caregiver?: Yes  Were Treatment Team discharge recommendations reviewed with patient/caregiver?: Yes  Did patient/caregiver verbalize understanding of patient care needs?: N/A- going to facility  Were patient/caregiver advised of the risks associated with not following Treatment Team discharge recommendations?: Yes    Contacts  Patient Contacts: daughter, Dr. [place] Baires  Relationship to Patient:: Family  Contact Method: Phone  Phone Number: [phone]  Reason/Outcome: Continuity of Care, Emergency Contact, Referral, Discharge Planning    Requested Home Health Care         Is the patient interested in HHC at discharge?: No    DME Referral Provided  Referral made for DME?: No    Other  Referral/Resources/Interventions Provided:  Interventions: SNF  Referral Comments: [facility] confirmed for 10:00am transport. AVS forwarded to [facility] and facility confirmed ability to accept patient this morning. Call made to Duarte at  ([phone]) to relay plan for d/c to [facility] this afternoon and transport arranged via [facility] for this morning. [facility] confirmed agreement with same. AVS faxed to them for their records ([phone]). Call made to patient's daughter, Dr. Baires, and reviewed above. Daughter in agreement with plan for transfer to [facility] this morning. Aware that she can follow with  at [facility] re: plan for long-term care vs patient's transition back to the community depending on how therapy goes. Encouraged her to reach out to [facility] to see waitlist option for LTC if that's what they wish to pursue.    Would you like to participate in our [facility] service program?  : No - Declined    Treatment Team Recommendation: SNF  Discharge Destination Plan:: SNF ([facility] (2029))  Transport at Discharge : [facility]  Dispatcher Contacted: Yes  Number/Name of Dispatcher: [employer]  Transported by (Company and Unit #): [facility]  ETA of Transport (Date): 01/17/24  ETA of Transport (Time): 1000 (confirmed)              IMM Given (Date):: 01/16/24  IMM Given to:: Patient          Accepting Facility Name, City & State : [facility])  Receiving Facility/Agency Phone Number: [phone]  Facility/Agency Fax Number: [phone]

## 2024-01-17 NOTE — ASSESSMENT & PLAN NOTE
Lab Results   Component Value Date    ZSP0LGNMYWWM 3.899 11/26/2023     Continue home levothyroxine 37.5 mcg

## 2024-01-17 NOTE — UTILIZATION REVIEW
NOTIFICATION OF ADMISSION DISCHARGE   This is a Notification of Discharge from [facility]. Please be advised that this patient has been discharge from our facility. Below you will find the admission and discharge date and time including the patient’s disposition.   UTILIZATION REVIEW CONTACT:  Romeo Carter  Utilization   Network Utilization Review Department  Phone: [phone] x carefully listen to the prompts. All voicemails are confidential.  Email: [email or web address]     ADMISSION INFORMATION  PRESENTATION DATE: 1/13/2024  1:22 PM  OBERVATION ADMISSION DATE:   INPATIENT ADMISSION DATE: 1/14/24 10:03 AM   DISCHARGE DATE: 1/17/2024  2:29 PM   DISPOSITION:Released to SNF/TCU/SNU Facility    Network Utilization Review Department  ATTENTION: Please call with any questions or concerns to [phone] and carefully listen to the prompts so that you are directed to the right person. All voicemails are confidential.   For Discharge needs, contact Care Management DC Support Team at [phone] opt. 2  Send all requests for admission clinical reviews, approved or denied determinations and any other requests to dedicated fax number below belonging to the campus where the patient is receiving treatment. List of dedicated fax numbers for the Facilities:  FACILITY NAME UR FAX NUMBER   ADMISSION DENIALS (Administrative/Medical Necessity) [phone]   DISCHARGE SUPPORT TEAM ([facility]) [phone]   PARENT CHILD HEALTH (Maternity/NICU/Pediatrics) [phone]   [facility] [phone]   [facility] [phone]   [facility] [phone]   [facility] [phone]   [facility] [phone]   [facility] [phone]   [facility] [phone]   [facility]  [phone]   [facility] [phone]   [facility] [phone]   [facility] [phone]

## 2024-01-18 ENCOUNTER — TRANSITIONAL CARE MANAGEMENT (OUTPATIENT)
Dept: FAMILY MEDICINE CLINIC | Facility: CLINIC | Age: 79
End: 2024-01-18

## 2024-01-18 LAB
BACTERIA BLD CULT: NORMAL
BACTERIA BLD CULT: NORMAL

## 2024-01-22 ENCOUNTER — PATIENT OUTREACH (OUTPATIENT)
Dept: FAMILY MEDICINE CLINIC | Facility: CLINIC | Age: 79
End: 2024-01-22

## 2024-01-22 NOTE — PROGRESS NOTES
ADT Notification received.  Patient discharged to [facility] 2029 under [employer].  Plan is for patient's dtr Dr. Baires to follow-up with  at [facility] to transition patient to long-term placement at [facility] vs transition patient back to the community pending progress with therapy.    Spoke with patient's dtr Dr. Baires.  Plan is for patient to now keep her [employer] insurance/program and will transition to [facility] [place] from [facility] SNF as [facility] is a contracted [facility] with [facility].  Will remain available to assist as needed.

## 2024-02-23 NOTE — CONSULTS
Consultation - Anesthesia Acute Pain Management   Victor Manuel Dockery 74 y o  female MRN: [id number]  Unit/Bed#: -01 Encounter: [id number]               Inpatient consult to Acute Pain Service  Consult performed by: Lyndsey Beauchamp ordered by: Sea Oliva        Admission Diagnosis:  Status post lumbar spinal fusion [Z98 1]    SURGERY DATE: 6/4/2018  Post-Op Diagnosis Codes:     * Status post lumbar spinal fusion [Z98 1]     * 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]     * Lumbar stenosis with neurogenic claudication [M48 062]     * Battery end of life of spinal cord stimulator [Z45 42]    Assessment:   74 y o  female STATUS POST   Procedure(s):  Reopening of lumbar incision for T12-L5 posterior instrumented fixation and fusion and T12-L4 posterior decompression  removal of left buttock implantable pulse generator and placement of new  implantable pulse generator  POD# 1 with acute on chronic pain and chronic opioid dependence     Principal Problem:    Compression of spinal cord (HCC)  Active Problems:    Failed back surgical syndrome    Status post lumbar spinal fusion    Lumbar stenosis with neurogenic claudication    Battery end of life of spinal cord stimulator      Plan:   PDMP reviewed iaw requirements before making medication recommendations  1  Reorder home fentanyl 50mcg patch stat  2  Will start a ketamine infusion at 0 1mg/kg/hr for 24 hours to minimize escalation on opioid regimen  3  Continue rest of current pain regimen and will reevaluate tomorrow    APS will continue to follow; please call  / [phone] ( [facility] [phone]) with any questions  These recommendations were communicated to Neurosurgery PA, plan was also discussed with patient  Please note that the APS provides consultative services regarding pain management only    With the exception of ketamine and epidural infusions and except when indicated, final decisions regarding starting or changing doses of analgesic medications are at the discretion of the consulting service  Off hours consultation and/or medication management is generally not available  History of Present Illness    Admit Date:  6/4/2018  Hospital Day:  1 day  Primary Service:  Neurosurgery  Attending Provider:  Stephen Abraham MD  Reason for Consult / Principal Problem: ACUTE POSTOPERATIVE PAIN CONTROL  Hx and PE limited by: Michael Cadena is a 74 y o  female presenting with status post back fusion and pulse generator  Patient states the pain in her back at incision is severe and hurts when she moves  She also states that she has been having to urinate about every hour  She wears a fentanyl patch at home and on an average day takes no more then 2x 10mg percocet  She denies recreational drugs  Patient states that her chronic lower extremity pain feels good with the spinal stimulator  No other complaints at this time  Daughter is a family physician and in room for discussion  We discussed the benefits and possible side effects of a ketamine infusion for 24 hours to help with pain  Patient and daughter in agreement to try ketamine and if patient has a negative side effect can be discontinued  Review of Systems   Constitutional: Negative  HENT: Negative  Respiratory: Negative  Cardiovascular: Negative  Genitourinary:        Frequent urination   Musculoskeletal: Positive for back pain  Skin: Negative          Historical Information   Past Medical History:   Diagnosis Date    Acid reflux     Anxiety     Arthritis     Asthma     Chronic narcotic dependence (HCC)     Chronic pain     Colon polyp     Cystocele     Diabetes mellitus ([address] )     Diverticulosis     Dysfunctional uterine bleeding     last assessed - [id number]    Fibromyalgia     Gastric ulcer     Gastroparesis     History of colonic polyps     last assessed - [id number]    History of gastroesophageal reflux (GERD)     Hypercholesterolemia     Hyperlipidemia     Hypertension     IBS (irritable bowel syndrome)     Kidney stone     Post laminectomy syndrome     Seasonal allergies     Spinal stenosis      Past Surgical History:   Procedure Laterality Date    APPENDECTOMY      BACK SURGERY      CHOLECYSTECTOMY      ESOPHAGOGASTRODUODENOSCOPY N/A 9/28/2016    Procedure: ESOPHAGOGASTRODUODENOSCOPY (EGD); Surgeon: Bob Pratt MD;  [facility]; Service:     HERNIA REPAIR      HYSTERECTOMY      TTAH-BSO last assessed - [id number]    LAMINECTOMY      LUMBAR LAMINECTOMY      WI COLONOSCOPY FLX DX W/COLLJ SPEC WHEN PFRMD N/A 3/2/2016    Procedure: EGD AND COLONOSCOPY;  Surgeon: oBb Pratt MD;  [facility]; Service: Gastroenterology    WI DILATE ESOPHAGUS N/A 9/28/2016    Procedure: DILATATION ESOPHAGEAL;  Surgeon: Bob Pratt MD;  [facility]; Service: Gastroenterology    WI ESOPHAGOGASTRODUODENOSCOPY TRANSORAL DIAGNOSTIC N/A 7/18/2016    Procedure: ESOPHAGOGASTRODUODENOSCOPY (EGD); Surgeon: Bob Pratt MD;  [facility];   Service: Gastroenterology     Social History   History   Alcohol Use No     Comment: Denied history of alcohol use     History   Drug Use No     Comment: Denied history of drug use     History   Smoking Status    Former Smoker    Quit date: 1/1/1970   Smokeless Tobacco    Never Used     Comment: Denied history of current ever day smoker, Former smoker and Never smoker all documented in [employer]       Meds/Allergies   Prescriptions Prior to Admission   Medication    Azilsartan Medoxomil (EDARBI) 40 MG TABS    dicyclomine (BENTYL) 10 mg capsule    Docusate Calcium (STOOL SOFTENER PO)    gabapentin (NEURONTIN) 100 mg capsule    guaiFENesin 400 mg    IRON PO    latanoprost (XALATAN) 0 005 % ophthalmic solution    levothyroxine 25 mcg tablet    loratadine (CLARITIN) 10 mg tablet    Lutein 10 MG TABS    meclizine (ANTIVERT) 25 mg tablet    meloxicam (MOBIC) 15 mg tablet    metaxalone Price (Do Not Change): 0.00 (SKELAXIN) 800 mg tablet    metFORMIN (GLUCOPHAGE) 1000 MG tablet    metoclopramide (REGLAN) 5 mg tablet    Milnacipran HCl (SAVELLA) 100 MG TABS    oxybutynin (DITROPAN XL) 15 MG 24 hr tablet    oxyCODONE-acetaminophen (PERCOCET)  mg per tablet    pantoprazole (PROTONIX) 40 mg tablet    pitavastatin (LIVALO) 2 mg    pyridoxine (VITAMIN B6) 100 mg tablet    fentaNYL (DURAGESIC) 50 mcg/hr       Inpatient MEDS  Scheduled Meds:   Current Facility-Administered Medications:  acetaminophen 975 mg Oral Q8H Crys Hines PA-C   dicyclomine 10 mg Oral TID PRN Aggie Herbert MD   docusate sodium 100 mg Oral BID Aggie Herbert MD   ferrous sulfate 325 mg Oral BID With Wayne Schlatter, MD   gabapentin 300 mg Oral BID Aggie Herbert MD   GuaiFENesin 400 mg Oral Daily PRN Aggie Herbert MD   heparin (porcine) 5,000 Units Subcutaneous Q8H [address] Aggie Herbert MD   HYDROmorphone 0 5 mg Intravenous Q1H PRN Aggie Herbert MD   latanoprost 1 drop Both Eyes HS Aggie Herbert MD   levothyroxine 25 mcg Oral Early Morning Aggie Herbert MD   loratadine 10 mg Oral Daily PRN Aggie Herbert MD   meclizine 25 mg Oral Q8H [address] Aggie Herbert MD   metFORMIN 1,000 mg Oral BID With Meals Aggie Herbert MD   methocarbamol 500 mg Oral Q6H [address] Aggie Herbert MD   Milnacipran HCl 1 tablet Oral Daily Aggie Herbert MD   ondansetron 4 mg Intravenous Q6H PRN Aggie Herbert MD   oxybutynin 15 mg Oral HS Aggie Herbert MD   oxyCODONE 10 mg Oral Q4H PRN Aggie Herbert MD   oxyCODONE 15 mg Oral Q4H PRN Buzzy Sandifer, PA-C   pantoprazole 40 mg Oral BID AC Aggie Herbert MD   pravastatin 40 mg Oral Daily With Fidelia Gonzalez MD   pyridoxine 100 mg Oral Daily Aggie Herbert MD   senna 1 tablet Oral Daily Aggie Herbert MD   valsartan 40 mg Oral Daily Aggie Herbert MD     Continuous Infusions:    PRN Meds:    dicyclomine 10 mg TID PRN   GuaiFENesin 400 mg Daily PRN   HYDROmorphone 0 5 mg Q1H PRN   loratadine 10 mg Daily PRN   ondansetron 4 mg Q6H PRN   oxyCODONE 10 mg Q4H PRN Instructions: This plan will send the code FBSE to the PM system.  DO NOT or CHANGE the price. Detail Level: Generalized oxyCODONE 15 mg Q4H PRN       Allergies   Allergen Reactions    Penicillins Anaphylaxis     Other reaction(s): Unknown Reaction    Sulfa Antibiotics Anaphylaxis     Other reaction(s): Unknown Reaction    Aspartame Other (See Comments) and Hypertension     Slurred speech, weakness, stroke sx    Iodinated Diagnostic Agents Hives     Pt has taken prep prior for contrast and has not had any break through reaction    Other Hives     Other reaction(s): Unknown Reaction  Contrast dye       Objective   /75 (BP Location: Right arm)   Pulse 105   Temp 98 1 °F (36 7 °C) (Oral)   Resp 20   Ht 5' (1 524 m)   Wt 86 4 kg (190 lb 7 6 oz)   LMP  (LMP Unknown)   SpO2 98%   BMI 37 20 kg/m²   Temp:  [97 5 °F (36 4 °C)-98 3 °F (36 8 °C)] 98 1 °F (36 7 °C)  HR:  [] 105  Resp:  [12-20] 20  BP: (103-176)/(52-84) 140/75  Arterial Line BP: (140-156)/(60-66) 140/60    Physical Exam   Constitutional: She is oriented to person, place, and time  Appears uncomfortable   Eyes: Conjunctivae and EOM are normal  Pupils are equal, round, and reactive to light  Neck: Normal range of motion  Pulmonary/Chest: Effort normal    Musculoskeletal: Normal range of motion  Neurological: She is alert and oriented to person, place, and time  Skin: Skin is warm and dry  She is not diaphoretic  Psychiatric: She has a normal mood and affect  Her behavior is normal        Counseling / Coordination of Care  Total floor / unit time spent today 20 minutes minutes  Greater than 50% of total time was spent with the patient and / or family counseling and / or coordination of care   A description of the counseling / coordination of care: pain management

## 2024-02-26 NOTE — DISCHARGE INSTR - AVS FIRST PAGE
LOOP RECORDER INSTRUCTIONS:  - Keep loop recorder incision dry for one week. Do not use lotions, powders, creams, or ointments on incision.     - Remove outer bandage 24-48 hours after procedure. There is waterproof glue present over the incision. This should keep the incision dry. Avoid picking at the glue or peeling it off. The glue will come off in its own time.     - Because the glue is waterproof, it is safe to shower if the glue remains on over the incision. It is ok to let the soap and water gently run over the incision. Avoid direct exposure to the stream of water. Do not soak the incision. Do not scrub. Pat dry.    - If the glue comes off in less that 7 days, place a waterproof bandage over the incision before showering.    - If present, leave underlying steri-strips in place. They will either fall off on their own or will be removed at the 2 week follow up appointment. If present, leave stitches in place. They will be removed at the 2 week follow up appointment.    - Please call the office if you notice redness, swelling, bleeding, or drainage from incision or if you develop fevers.      Cardiac Loop Recorder Insertion      WHAT YOU SHOULD KNOW:    A cardiac loop recorder is a device used to diagnose heart rhythm problems, such as a fast or irregular heartbeat. It is implanted in your left chest, just under the skin. The device records a pattern of your heart's rhythm, called an EKG. Your device records automatic EKGs, depending on how your caregiver programs it. You may also receive a handheld controller. You press a button on the controller when you have symptoms, such as dizziness, lightheadedness, or palpitations. The device will record an EKG at that moment. The recording can help your caregiver see if your symptoms may be caused by heart rhythm problems. Your caregiver will remove the device after it has collected enough data. You may need the device for up to 3 years. The procedure to remove the  device is similar to the procedure used to implant it.      AFTER YOU LEAVE:    Follow up with your cardiologist as directed: You will need to present to the office in 2 weeks. A nurse at the device clinic will check your incision and remove any stitches or steri strips that may be present. They may also program your device settings again. They will retrieve data from the device every 1 to 3 months with a monitor held over your skin. You may be able to transmit data from your device from home as well. You will do this by calling a number provided by the device clinic or as they have instructed you. Ask for information about this process. Write down your questions so you remember to ask them during your visits.      Wound care: Keep loop recorder incision dry for one week. Do not use lotions, powders, creams, or ointments on incision. Remove outer bandage 24-48 hours after procedure. There is waterproof glue present over the incision. This should keep the incision dry. Avoid picking at the glue or peeling it off. The glue will come off in its own time. Because the glue is waterproof, it is safe to shower if the glue remains on over the incision. It is ok to let the soap and water gently run over the incision. Avoid direct exposure to the stream of water. Do not soak the incision. Do not scrub. Pat dry.If the glue comes off in less that 7 days, place a waterproof bandage over the incision before showering. If present, leave underlying steri-strips in place. They will either fall off on their own or will be removed at the 2 week follow up appointment. If present, leave stitches in place. They will be removed at the 2 week follow up appointment.    Please call the office if you notice redness, swelling, bleeding, or drainage from incision or if you develop fevers.    Return to activity: If you received anesthesia, you will not be able to drive for 24 hours. Otherwise, most people can return to normal activities soon  after the procedure. Your cardiologist may want to know if your work involves electrical current or high-voltage equipment. Ask about other electrical items that could interfere with your cardiac loop recorder.      Contact your cardiologist if:   You have a fever or chills.    Your wound is red, swollen, or draining pus.    You have questions or concerns about your condition or care.    Seek care immediately or call 911 if:   You feel weak, dizzy, or faint.    You lose consciousness.      © 2014 [employer]. Information is for End User's use only and may not be sold, redistributed or otherwise used for commercial purposes. All illustrations and images included in CareNotes® are the copyrighted property of [employer], [employer] or [employer].    The above information is an  only. It is not intended as medical advice for individual conditions or treatments. Talk to your doctor, nurse or pharmacist before following any medical regimen to see if it is safe and effective for you.

## 2024-02-26 NOTE — H&P
EP Service    Mattiesa CARLEY Varela is an 78 year old female with a history of possible CVA on aspirin/Plavix, syncope, DM2, HTN, HLD, COPD, CKD who presents for loop recorder implant for arrhythmia surveillance.     Plan:  Loop recorder implant  Pair remote monitor  Keep implant site dry x 1 week  D/C home post procedure  F/U device clinic in 2 weeks for wound check and device interrogation

## 2024-03-02 ENCOUNTER — HOSPITAL ENCOUNTER (OUTPATIENT)
Facility: HOSPITAL | Age: 79
Setting detail: OUTPATIENT SURGERY
Discharge: HOME | End: 2024-03-02
Attending: INTERNAL MEDICINE | Admitting: INTERNAL MEDICINE
Payer: MEDICARE

## 2024-03-02 VITALS
OXYGEN SATURATION: 95 % | SYSTOLIC BLOOD PRESSURE: 139 MMHG | DIASTOLIC BLOOD PRESSURE: 66 MMHG | WEIGHT: 161 LBS | HEART RATE: 90 BPM | TEMPERATURE: 98.3 F | HEIGHT: 60 IN | BODY MASS INDEX: 31.61 KG/M2 | RESPIRATION RATE: 16 BRPM

## 2024-03-02 DIAGNOSIS — Z98.890 HISTORY OF LOOP RECORDER: Primary | ICD-10-CM

## 2024-03-02 DIAGNOSIS — R29.90 STROKE-LIKE SYMPTOMS: ICD-10-CM

## 2024-03-02 PROCEDURE — 33285 INSJ SUBQ CAR RHYTHM MNTR: CPT | Performed by: INTERNAL MEDICINE

## 2024-03-02 PROCEDURE — C1764 EVENT RECORDER, CARDIAC: HCPCS | Performed by: INTERNAL MEDICINE

## 2024-03-02 DEVICE — LOOP RECORDER REVEAL LINQ II SYS DEVICE ONLY: Type: IMPLANTABLE DEVICE | Site: CHEST | Status: FUNCTIONAL

## 2024-03-02 RX ORDER — LIDOCAINE HYDROCHLORIDE AND EPINEPHRINE 10; 10 MG/ML; UG/ML
INJECTION, SOLUTION INFILTRATION; PERINEURAL CODE/TRAUMA/SEDATION MEDICATION
Status: DISCONTINUED | OUTPATIENT
Start: 2024-03-02 | End: 2024-03-02 | Stop reason: HOSPADM

## 2024-03-06 ENCOUNTER — APPOINTMENT (OUTPATIENT)
Dept: LAB | Facility: HOSPITAL | Age: 79
End: 2024-03-06
Payer: MEDICARE

## 2024-03-06 DIAGNOSIS — I15.9 SECONDARY HYPERTENSION: ICD-10-CM

## 2024-03-06 DIAGNOSIS — E78.00 HYPERCHOLESTEROLEMIA: ICD-10-CM

## 2024-03-06 DIAGNOSIS — N17.9 AKI (ACUTE KIDNEY INJURY) (HCC): ICD-10-CM

## 2024-03-06 LAB
ANION GAP SERPL CALCULATED.3IONS-SCNC: 12 MMOL/L
BNP SERPL-MCNC: 325 PG/ML (ref 0–100)
BUN SERPL-MCNC: 13 MG/DL (ref 5–25)
CALCIUM SERPL-MCNC: 9.4 MG/DL (ref 8.4–10.2)
CHLORIDE SERPL-SCNC: 100 MMOL/L (ref 96–108)
CO2 SERPL-SCNC: 29 MMOL/L (ref 21–32)
CREAT SERPL-MCNC: 0.93 MG/DL (ref 0.6–1.3)
GFR SERPL CREATININE-BSD FRML MDRD: 58 ML/MIN/1.73SQ M
GLUCOSE SERPL-MCNC: 102 MG/DL (ref 65–140)
MAGNESIUM SERPL-MCNC: 1.9 MG/DL (ref 1.9–2.7)
PHOSPHATE SERPL-MCNC: 2.8 MG/DL (ref 2.3–4.1)
POTASSIUM SERPL-SCNC: 4.6 MMOL/L (ref 3.5–5.3)
SODIUM SERPL-SCNC: 141 MMOL/L (ref 135–147)

## 2024-03-06 PROCEDURE — 84100 ASSAY OF PHOSPHORUS: CPT

## 2024-03-06 PROCEDURE — 83735 ASSAY OF MAGNESIUM: CPT

## 2024-03-06 PROCEDURE — 80048 BASIC METABOLIC PNL TOTAL CA: CPT

## 2024-03-06 PROCEDURE — 36415 COLL VENOUS BLD VENIPUNCTURE: CPT

## 2024-03-06 PROCEDURE — 83880 ASSAY OF NATRIURETIC PEPTIDE: CPT

## 2024-03-14 ENCOUNTER — IN-CLINIC DEVICE VISIT (OUTPATIENT)
Dept: CARDIOLOGY CLINIC | Facility: CLINIC | Age: 79
End: 2024-03-14
Payer: MEDICARE

## 2024-03-14 DIAGNOSIS — Z95.818 PRESENCE OF OTHER CARDIAC IMPLANTS AND GRAFTS: Primary | ICD-10-CM

## 2024-03-14 PROCEDURE — 93291 INTERROG DEV EVAL SCRMS IP: CPT | Performed by: INTERNAL MEDICINE

## 2024-03-14 NOTE — PROGRESS NOTES
"Results for orders placed or performed in visit on 03/14/24   Cardiac EP device report    Narrative    MDT [id number] ICM - ACTIVE SYSTEM IS MRI CONDITIONAL  DEVICE INTERROGATED IN THE [facility] OFFICE: WOUND CHECK: INCISION CLEAN AND DRY WITH EDGES APPROXIMATED; SUTURES REMOVED; WOUND CARE AND RESTRICTIONS REVIEWED WITH PATIENT. BATTERY VOLTAGE \"OK\". PRESENTING ECG SHOWING SR, PAC'S @ AVG 64 BPM; R WAVE MEASURE 0.25 MV; NO PATIENT ACTIVATED EPISODES. 2 DEVICE CLASSIFIED AF EPISODES, LONGEST EPISODE IS 4:00 MINS, AVAILABLE STRIPS DEMONSTRATE NO ATRIAL FIBRILLATION. INSTEAD EGM'S SHOW SR, FREQUENT PAC'S, PAT; AF BURDEN IS <0.1%; PVC BURDEN 2%; PATIENT TAKING ASA 81, CLOPIDOGREL, METOPROLOL SUCC; NO PROGRAMMING CHANGES MADE TO DEVICE PARAMETERS. NORMAL DEVICE FUNCTION.  ES        "

## 2024-03-26 ENCOUNTER — LAB REQUISITION (OUTPATIENT)
Dept: LAB | Facility: HOSPITAL | Age: 79
End: 2024-03-26
Payer: MEDICARE

## 2024-03-26 DIAGNOSIS — N39.0 URINARY TRACT INFECTION, SITE NOT SPECIFIED: ICD-10-CM

## 2024-03-26 LAB
ANION GAP SERPL CALCULATED.3IONS-SCNC: 7 MMOL/L (ref 4–13)
BUN SERPL-MCNC: 16 MG/DL (ref 5–25)
CALCIUM SERPL-MCNC: 9 MG/DL (ref 8.4–10.2)
CHLORIDE SERPL-SCNC: 103 MMOL/L (ref 96–108)
CO2 SERPL-SCNC: 28 MMOL/L (ref 21–32)
CREAT 24H UR-MRATE: 0.4 G/24HR (ref 0.6–1.8)
CREAT SERPL-MCNC: 0.86 MG/DL (ref 0.6–1.3)
GFR SERPL CREATININE-BSD FRML MDRD: 63 ML/MIN/1.73SQ M
GLUCOSE SERPL-MCNC: 123 MG/DL (ref 65–140)
MAGNESIUM SERPL-MCNC: 2 MG/DL (ref 1.9–2.7)
PERIOD: 24 HOURS
POTASSIUM 24H UR-SCNC: 32.85 MMOL/24 HRS (ref 25–125)
POTASSIUM SERPL-SCNC: 4.4 MMOL/L (ref 3.5–5.3)
SODIUM 24H UR-SRATE: 47.25 MMOL/24 HRS (ref 40–220)
SODIUM SERPL-SCNC: 138 MMOL/L (ref 135–147)
SPECIMEN VOL UR: 750 ML

## 2024-03-26 PROCEDURE — 82570 ASSAY OF URINE CREATININE: CPT | Performed by: FAMILY MEDICINE

## 2024-03-26 PROCEDURE — 83735 ASSAY OF MAGNESIUM: CPT | Performed by: FAMILY MEDICINE

## 2024-03-26 PROCEDURE — 84300 ASSAY OF URINE SODIUM: CPT | Performed by: FAMILY MEDICINE

## 2024-03-26 PROCEDURE — 80048 BASIC METABOLIC PNL TOTAL CA: CPT | Performed by: FAMILY MEDICINE

## 2024-03-26 PROCEDURE — 84133 ASSAY OF URINE POTASSIUM: CPT | Performed by: FAMILY MEDICINE

## 2024-04-22 RX ORDER — DOXYCYCLINE HYCLATE 100 MG/1
100 CAPSULE ORAL 2 TIMES DAILY
COMMUNITY
Start: 2024-03-08

## 2024-04-22 RX ORDER — GUAIFENESIN 600 MG/1
600 TABLET, EXTENDED RELEASE ORAL 2 TIMES DAILY
COMMUNITY
Start: 2024-03-08

## 2024-04-23 NOTE — PROGRESS NOTES
4/24/2024      No chief complaint on file.        Assessment and Plan    79 y.o. female     1. Urinary frequency and urgency  2. History of urinary retention  3. Mixed urinary incontinence  - UA today negative for nitrites, leukocytes, blood  - PVR today 0 mL   - Voiding trial was performed at facility, has been voiding since  - Continue conservative measures with adequate hydration, avoidance of bladder irritants, avoidance of constipation  - Continue topical estrogen replacement  -  Has tried and failed Ditropan, Ditropan XL, Vesicare, Detrol LA, Toviaz, Sanctura, Myrbetriq. Discussed tertiary treatments including bladder botox. Will need cystoscopy for evaluation prior to ensure patient is candidate.   - Discussed Kegels and pelvic floor PT. Discussed workup of stress incontinence and potential procedures   - Discussed workup of bladder   - Ultrasound kidney and bladder ordered  - Follow up for UDS and cystoscopy  - Call with any questions or concerns in the meantime  - All questions answered; patient understands and agrees with plan       History of Present Illness  Mattie Varela is a 79 y.o. female patient with history of urinary frequency and urgency, urinary retention here for follow up.     Patient was previously seen in the office for urinary frequency and urgency.  She has previously tried oxybutynin, however with side effects.  She was switched to Sanctura and topical estrogen cream.  Patient was lost to follow-up.  Patient was then evaluated in the hospital in November 2023 and was found to have urinary retention. Has tried and failed Ditropan, Ditropan XL, Vesicare, Detrol LA, Toviaz, Sanctura, Myrbetriq. She has also tried Kegel exercise and biofeedback without success.   She had voiding trial while in nursing home and has not had catheter since.  States she continues with frequency and urgency.  Denies gross hematuria, dysuria, feeling of incomplete bladder emptying. States she does have urinary  incontinence associated with both urinary urgency and stress (coughing, sneezing, laughing, standing).     Review of Systems   Constitutional:  Negative for activity change, appetite change, chills and fever.   HENT:  Negative for congestion and trouble swallowing.    Respiratory:  Negative for cough and shortness of breath.    Cardiovascular:  Negative for chest pain, palpitations and leg swelling.   Gastrointestinal:  Negative for abdominal pain, constipation, diarrhea, nausea and vomiting.   Genitourinary:  Positive for frequency and urgency. Negative for difficulty urinating, dysuria, flank pain and hematuria.   Musculoskeletal:  Negative for back pain and gait problem.   Skin:  Negative for wound.   Allergic/Immunologic: Negative for immunocompromised state.   Neurological:  Negative for dizziness and syncope.   Hematological:  Does not bruise/bleed easily.   Psychiatric/Behavioral:  Negative for confusion.    All other systems reviewed and are negative.      Vitals  Vitals:    04/24/24 0911   BP: 138/78   BP Location: Left arm   Patient Position: Sitting   Cuff Size: Standard   Pulse: 67   Resp: 14   SpO2: 100%   Weight: 78.7 kg (173 lb 9.6 oz)   Height: 5' (1.524 m)       Physical Exam  Constitutional:       General: She is not in acute distress.     Appearance: Normal appearance. She is not ill-appearing, toxic-appearing or diaphoretic.   HENT:      Head: Normocephalic.      Nose: No congestion.   Eyes:      General: No scleral icterus.        Right eye: No discharge.         Left eye: No discharge.      Conjunctiva/sclera: Conjunctivae normal.      Pupils: Pupils are equal, round, and reactive to light.   Pulmonary:      Effort: Pulmonary effort is normal.   Musculoskeletal:      Cervical back: Normal range of motion.   Skin:     General: Skin is warm and dry.      Coloration: Skin is not jaundiced or pale.      Findings: No bruising, erythema, lesion or rash.   Neurological:      General: No focal deficit  present.      Mental Status: She is alert and oriented to person, place, and time. Mental status is at baseline.      Gait: Gait normal.   Psychiatric:         Mood and Affect: Mood normal.         Behavior: Behavior normal.         Thought Content: Thought content normal.         Judgment: Judgment normal.           Past History  Past Medical History:   Diagnosis Date    Acid reflux     Acute kidney injury (HCC)     Anemia     hx of iron-deficient    Anxiety     Arthritis     Asthma     last needed inhaler last 2020    Basal cell carcinoma     upper lip    Chronic narcotic dependence (HCC)     Chronic pain     Colon polyp     Cystocele     Diabetes mellitus (HCC)     stable    Disease of thyroid gland     hypothyroidism    Diverticulosis     Dizziness     at times    Dysfunctional uterine bleeding     last assessed - [id number]    Dysphagia     Fibromyalgia     Gastric ulcer     Gastroparesis     History of colonic polyps     last assessed - 2016    History of gastroesophageal reflux (GERD)     Hypercholesterolemia     Hyperlipidemia     Hypertension     Hyponatremia     IBS (irritable bowel syndrome)     Pneumobilia 2022    Post laminectomy syndrome     S/P insertion of spinal cord stimulator 2018    s/p [employer] loop recorder 3/2/2024 2024    Seasonal allergies     Shortness of breath     exertional    Spinal stenosis     Status post lumbar spinal fusion 2018    Stroke ([facility])     pt states slight stroke 2022     Social History     Socioeconomic History    Marital status:      Spouse name: None    Number of children: None    Years of education: None    Highest education level: None   Occupational History    Occupation: Retired   Tobacco Use    Smoking status: Former     Current packs/day: 0.00     Types: Cigarettes     Quit date: 1970     Years since quittin.3    Smokeless tobacco: Never    Tobacco comments:     Denied history of current ever day smoker,  Former smoker and Never smoker all documented in [employer]   Vaping Use    Vaping status: None   Substance and Sexual Activity    Alcohol use: Not Currently     Comment: Denied history of alcohol use    Drug use: No     Comment: Denied history of drug use    Sexual activity: Not Currently   Other Topics Concern    None   Social History Narrative    Marital History - Currently  per [employer]     Social Determinants of Health     Financial Resource Strain: Medium Risk (2022)    Overall Financial Resource Strain (CARDIA)     Difficulty of Paying Living Expenses: Somewhat hard   Food Insecurity: No Food Insecurity (1/15/2024)    Hunger Vital Sign     Worried About Running Out of Food in the Last Year: Never true     Ran Out of Food in the Last Year: Never true   Transportation Needs: No Transportation Needs (1/15/2024)    PRAPARE - Transportation     Lack of Transportation (Medical): No     Lack of Transportation (Non-Medical): No   Physical Activity: Not on file   Stress: Not on file   Social Connections: Not on file   Intimate Partner Violence: Not on file   Housing Stability: Low Risk  (1/15/2024)    Housing Stability Vital Sign     Unable to Pay for Housing in the Last Year: No     Number of Places Lived in the Last Year: 1     Unstable Housing in the Last Year: No     Social History     Tobacco Use   Smoking Status Former    Current packs/day: 0.00    Types: Cigarettes    Quit date: 1970    Years since quittin.3   Smokeless Tobacco Never   Tobacco Comments    Denied history of current ever day smoker, Former smoker and Never smoker all documented in [employer]     Family History   Problem Relation Age of Onset    Lung cancer Mother 46    Pulmonary embolism Father     No Known Problems Sister     No Known Problems Daughter     No Known Problems Daughter     Stroke Maternal Grandmother     Heart attack Maternal Grandfather     No Known Problems Paternal Grandmother     No Known Problems Paternal  "Grandfather     No Known Problems Maternal Aunt     Diabetes Family         Diabetes mellitus    Hypertension Family     Stroke Family         Stroke complications       The following portions of the patient's history were reviewed and updated as appropriate: allergies, current medications, past medical history, past social history, past surgical history and problem list.    Results  No results found for this or any previous visit (from the past 1 hour(s)).  ]  No results found for: \"PSA\"  Lab Results   Component Value Date    GLUCOSE 193 (H) 06/04/2018    CALCIUM 9.0 03/26/2024     04/30/2015    K 4.4 03/26/2024    CO2 28 03/26/2024     03/26/2024    BUN 16 03/26/2024    CREATININE 0.86 03/26/2024     Lab Results   Component Value Date    WBC 4.54 01/17/2024    HGB 8.2 (L) 01/17/2024    HCT 26.6 (L) 01/17/2024    MCV 97 01/17/2024     01/17/2024       Debbie Chris PA-C  "

## 2024-04-24 ENCOUNTER — OFFICE VISIT (OUTPATIENT)
Dept: UROLOGY | Facility: CLINIC | Age: 79
End: 2024-04-24
Payer: MEDICARE

## 2024-04-24 ENCOUNTER — TELEPHONE (OUTPATIENT)
Dept: UROLOGY | Facility: CLINIC | Age: 79
End: 2024-04-24

## 2024-04-24 VITALS
BODY MASS INDEX: 34.08 KG/M2 | WEIGHT: 173.6 LBS | RESPIRATION RATE: 14 BRPM | DIASTOLIC BLOOD PRESSURE: 78 MMHG | SYSTOLIC BLOOD PRESSURE: 138 MMHG | HEART RATE: 67 BPM | OXYGEN SATURATION: 100 % | HEIGHT: 60 IN

## 2024-04-24 DIAGNOSIS — R32 URINARY INCONTINENCE, UNSPECIFIED TYPE: Primary | ICD-10-CM

## 2024-04-24 LAB
POST-VOID RESIDUAL VOLUME, ML POC: 0 ML
SL AMB  POCT GLUCOSE, UA: NORMAL
SL AMB LEUKOCYTE ESTERASE,UA: NORMAL
SL AMB POCT BILIRUBIN,UA: NORMAL
SL AMB POCT BLOOD,UA: NORMAL
SL AMB POCT CLARITY,UA: CLEAR
SL AMB POCT COLOR,UA: YELLOW
SL AMB POCT KETONES,UA: NORMAL
SL AMB POCT NITRITE,UA: NORMAL
SL AMB POCT PH,UA: 7.5
SL AMB POCT SPECIFIC GRAVITY,UA: 1.01
SL AMB POCT URINE PROTEIN: NORMAL
SL AMB POCT UROBILINOGEN: NORMAL

## 2024-04-24 PROCEDURE — 99213 OFFICE O/P EST LOW 20 MIN: CPT | Performed by: PHYSICIAN ASSISTANT

## 2024-04-24 PROCEDURE — 81002 URINALYSIS NONAUTO W/O SCOPE: CPT | Performed by: PHYSICIAN ASSISTANT

## 2024-04-24 PROCEDURE — 51798 US URINE CAPACITY MEASURE: CPT | Performed by: PHYSICIAN ASSISTANT

## 2024-04-24 NOTE — TELEPHONE ENCOUNTER
Please schedule patient for UDS on 5/8 at 1 PM at the [place] office. Patient is scheduled for next available cysto with DAPHNE in September for review and cysto per Debbie's instructions. If that cysto needs to be moved up please let us know and advise on a new appt since that is Daphne's first available and she is established with him. Thank you!            Return for UDS, cystoscopy, US kidney and bladder, pelvic floor PT.

## 2024-04-25 NOTE — TELEPHONE ENCOUNTER
Patient called and confirmed appointment dates. For UDS 05/08/24 and follow up 05/29/24 with Dr. Ramirez.

## 2024-04-25 NOTE — TELEPHONE ENCOUNTER
UDS review scheduled. Will just need to have two separate appointments for the cysto and the review. Please call and confirm

## 2024-04-29 ENCOUNTER — LAB REQUISITION (OUTPATIENT)
Dept: LAB | Facility: HOSPITAL | Age: 79
End: 2024-04-29
Payer: MEDICARE

## 2024-04-29 ENCOUNTER — TELEPHONE (OUTPATIENT)
Dept: UROLOGY | Facility: CLINIC | Age: 79
End: 2024-04-29

## 2024-04-29 DIAGNOSIS — R53.83 OTHER FATIGUE: ICD-10-CM

## 2024-04-29 LAB — BNP SERPL-MCNC: 311 PG/ML (ref 0–100)

## 2024-04-29 PROCEDURE — 83880 ASSAY OF NATRIURETIC PEPTIDE: CPT | Performed by: NURSE PRACTITIONER

## 2024-04-29 NOTE — TELEPHONE ENCOUNTER
----- Message from Tierra Lr RN sent at 4/29/2024  7:19 AM EDT -----  Regarding: FW:  Clerical: Please confirm sooner cystoscopy scheduled for June. If accepted please cancel appt for September, thanks.     Debbie and Allison knutson  ----- Message -----  From: Debbie Chris PA-C  Sent: 4/28/2024   8:43 PM EDT  To: Tierra Lr RN  Subject: FW:                                              Hey. Not sure what we can do about this? You think you could help? She has mixed incontinence. Failed all OAB meds. I was going to have her do pelvic floor PT, UDS, and cystoscopy to evaluate for bladder Botox/sling.  ----- Message -----  From: NANY Pollock  Sent: 4/26/2024   4:29 PM EDT  To: STEPHANIE Wallis,     I hate to bother you but I work with Mrs Varela's daughter (Dr Baires) and she is not happy that her mother needs to wait until September before anything can be done. She states that her mother (th patient) has no quality a life because of the large amount of urinary incontinence. Dr Baires would like to see if something might be done sooner.  She report's the patient is developing sores from always being wet.     I understand that people should not special treatment but I will appreciate if you could look into seeing her quicker.       NANY Klein

## 2024-05-01 ENCOUNTER — LAB REQUISITION (OUTPATIENT)
Dept: LAB | Facility: HOSPITAL | Age: 79
End: 2024-05-01
Payer: MEDICARE

## 2024-05-01 DIAGNOSIS — R53.83 OTHER FATIGUE: ICD-10-CM

## 2024-05-01 LAB
ANION GAP SERPL CALCULATED.3IONS-SCNC: 8 MMOL/L (ref 4–13)
BUN SERPL-MCNC: 19 MG/DL (ref 5–25)
CALCIUM SERPL-MCNC: 9.2 MG/DL (ref 8.4–10.2)
CHLORIDE SERPL-SCNC: 102 MMOL/L (ref 96–108)
CO2 SERPL-SCNC: 30 MMOL/L (ref 21–32)
CREAT SERPL-MCNC: 1.1 MG/DL (ref 0.6–1.3)
GFR SERPL CREATININE-BSD FRML MDRD: 47 ML/MIN/1.73SQ M
GLUCOSE SERPL-MCNC: 134 MG/DL (ref 65–140)
POTASSIUM SERPL-SCNC: 4.3 MMOL/L (ref 3.5–5.3)
SODIUM SERPL-SCNC: 140 MMOL/L (ref 135–147)

## 2024-05-01 PROCEDURE — 80048 BASIC METABOLIC PNL TOTAL CA: CPT | Performed by: NURSE PRACTITIONER

## 2024-05-08 ENCOUNTER — PROCEDURE VISIT (OUTPATIENT)
Dept: UROLOGY | Facility: MEDICAL CENTER | Age: 79
End: 2024-05-08
Payer: MEDICARE

## 2024-05-08 DIAGNOSIS — N39.46 MIXED STRESS AND URGE URINARY INCONTINENCE: Primary | ICD-10-CM

## 2024-05-08 DIAGNOSIS — R39.15 URGENCY OF URINATION: ICD-10-CM

## 2024-05-08 DIAGNOSIS — N32.81 DETRUSOR INSTABILITY: ICD-10-CM

## 2024-05-08 DIAGNOSIS — N36.44 DETRUSOR SPHINCTER DYSSYNERGIA: ICD-10-CM

## 2024-05-08 LAB
SL AMB  POCT GLUCOSE, UA: NEGATIVE
SL AMB LEUKOCYTE ESTERASE,UA: NEGATIVE
SL AMB POCT BILIRUBIN,UA: NEGATIVE
SL AMB POCT BLOOD,UA: NEGATIVE
SL AMB POCT CLARITY,UA: CLEAR
SL AMB POCT COLOR,UA: YELLOW
SL AMB POCT KETONES,UA: NEGATIVE
SL AMB POCT NITRITE,UA: NEGATIVE
SL AMB POCT PH,UA: 7.5
SL AMB POCT SPECIFIC GRAVITY,UA: 1.01
SL AMB POCT URINE PROTEIN: NEGATIVE
SL AMB POCT UROBILINOGEN: NEGATIVE

## 2024-05-08 PROCEDURE — 51728 CYSTOMETROGRAM W/VP: CPT

## 2024-05-08 PROCEDURE — 51784 ANAL/URINARY MUSCLE STUDY: CPT

## 2024-05-08 PROCEDURE — 81002 URINALYSIS NONAUTO W/O SCOPE: CPT

## 2024-05-08 PROCEDURE — 51797 INTRAABDOMINAL PRESSURE TEST: CPT

## 2024-05-08 NOTE — PROGRESS NOTES
"CC: \"I have a weak bladder, it's not holding my urine\"    Denies pain / burning with voiding    Uroflow:       Voided volume:       20.5 ml       Max flow rate:        12.1 ml/sec       Average flow rate:   3.1  ml/sec       PVR:                25 ml       Patient felt volume voided  was:   nomal        CMG:       Position:  sitting           Fill sensation:     8  ml,    pdet   -2  cmH2O       First urge:         56  ml,    pdet   -8 cmH2O        Normal urge:    68  ml,    pdet    0 cmH2O       Must urge:        87 ml,     pdet   -1 cmH2O    Permission to void:             377  ml,     pdet 14 cmH2O       Max pdet during void     23 cm H2O       Voided volume:    115.7 ml    Bladder stability:     unstable at  285 ml  without leakage   + DI at 337 ml with leak   + DI at 377 ml with leak and void    Compliance:  normal        Sphincter Function:   No MAXX provoked at 100 and 200 ml   + MAXX at 300 ml at 77 cmH2O    EMG activity:       Normal during filling,        abnormal during voiding    Comments::     Sensory urgency   + DI with leakage   + MAXX   + EMG activity during voiding   Straight catheterized for 150 ml post test    Urodynamics    Date/Time: 5/8/2024 1:00 PM    Performed by: Annalee Mclaughlin RN  Authorized by: Torey Draper MD  Universal Protocol:  Consent: Verbal consent obtained. Written consent obtained.  Risks and benefits: risks, benefits and alternatives were discussed  Consent given by: patient  Patient understanding: patient states understanding of the procedure being performed  Patient consent: the patient's understanding of the procedure matches consent given  Procedure consent: procedure consent matches procedure scheduled  Patient identity confirmed: verbally with patient  Procedure - Urodynamics:  Procedure details: CMG and EMG      Voiding Pressure Study: Yes    Intra-abdominal Voiding Pressure Study: Yes    Post-procedure:     Patient tolerance: Patient tolerated procedure well with no " immediate complications

## 2024-05-22 ENCOUNTER — HOSPITAL ENCOUNTER (INPATIENT)
Facility: HOSPITAL | Age: 79
LOS: 6 days | Discharge: NURSING, REHAB OR LONG-TERM CARE | DRG: 291 | End: 2024-05-28
Attending: EMERGENCY MEDICINE | Admitting: INTERNAL MEDICINE
Payer: MEDICARE

## 2024-05-22 ENCOUNTER — APPOINTMENT (EMERGENCY)
Dept: RADIOLOGY | Facility: HOSPITAL | Age: 79
DRG: 291 | End: 2024-05-22
Payer: MEDICARE

## 2024-05-22 DIAGNOSIS — I87.2 CHRONIC VENOUS INSUFFICIENCY: ICD-10-CM

## 2024-05-22 DIAGNOSIS — M79.606 LEG PAIN: ICD-10-CM

## 2024-05-22 DIAGNOSIS — M79.604 LEG PAIN, RIGHT: ICD-10-CM

## 2024-05-22 DIAGNOSIS — R60.0 BILATERAL LOWER EXTREMITY EDEMA: Primary | ICD-10-CM

## 2024-05-22 DIAGNOSIS — I50.9 ACUTE CONGESTIVE HEART FAILURE, UNSPECIFIED HEART FAILURE TYPE (HCC): ICD-10-CM

## 2024-05-22 PROBLEM — M79.89 LEG SWELLING: Status: ACTIVE | Noted: 2024-05-22

## 2024-05-22 LAB
2HR DELTA HS TROPONIN: 2 NG/L
ALBUMIN SERPL BCP-MCNC: 3.9 G/DL (ref 3.5–5)
ALP SERPL-CCNC: 126 U/L (ref 34–104)
ALT SERPL W P-5'-P-CCNC: 8 U/L (ref 7–52)
ANION GAP SERPL CALCULATED.3IONS-SCNC: 5 MMOL/L (ref 4–13)
APTT PPP: 28 SECONDS (ref 23–37)
AST SERPL W P-5'-P-CCNC: 16 U/L (ref 13–39)
ATRIAL RATE: 70 BPM
ATRIAL RATE: 77 BPM
BASOPHILS # BLD AUTO: 0.08 THOUSANDS/ÂΜL (ref 0–0.1)
BASOPHILS NFR BLD AUTO: 1 % (ref 0–1)
BILIRUB SERPL-MCNC: 0.28 MG/DL (ref 0.2–1)
BNP SERPL-MCNC: 302 PG/ML (ref 0–100)
BUN SERPL-MCNC: 22 MG/DL (ref 5–25)
CALCIUM SERPL-MCNC: 9.1 MG/DL (ref 8.4–10.2)
CARDIAC TROPONIN I PNL SERPL HS: 17 NG/L
CARDIAC TROPONIN I PNL SERPL HS: 19 NG/L
CHLORIDE SERPL-SCNC: 104 MMOL/L (ref 96–108)
CO2 SERPL-SCNC: 30 MMOL/L (ref 21–32)
CREAT SERPL-MCNC: 1.35 MG/DL (ref 0.6–1.3)
EOSINOPHIL # BLD AUTO: 0.41 THOUSAND/ÂΜL (ref 0–0.61)
EOSINOPHIL NFR BLD AUTO: 6 % (ref 0–6)
ERYTHROCYTE [DISTWIDTH] IN BLOOD BY AUTOMATED COUNT: 13.4 % (ref 11.6–15.1)
GFR SERPL CREATININE-BSD FRML MDRD: 36 ML/MIN/1.73SQ M
GLUCOSE SERPL-MCNC: 137 MG/DL (ref 65–140)
GLUCOSE SERPL-MCNC: 80 MG/DL (ref 65–140)
HCT VFR BLD AUTO: 35.8 % (ref 34.8–46.1)
HGB BLD-MCNC: 11.2 G/DL (ref 11.5–15.4)
IMM GRANULOCYTES # BLD AUTO: 0.02 THOUSAND/UL (ref 0–0.2)
IMM GRANULOCYTES NFR BLD AUTO: 0 % (ref 0–2)
INR PPP: 0.94 (ref 0.84–1.19)
LYMPHOCYTES # BLD AUTO: 2.08 THOUSANDS/ÂΜL (ref 0.6–4.47)
LYMPHOCYTES NFR BLD AUTO: 29 % (ref 14–44)
MCH RBC QN AUTO: 28.6 PG (ref 26.8–34.3)
MCHC RBC AUTO-ENTMCNC: 31.3 G/DL (ref 31.4–37.4)
MCV RBC AUTO: 92 FL (ref 82–98)
MONOCYTES # BLD AUTO: 0.7 THOUSAND/ÂΜL (ref 0.17–1.22)
MONOCYTES NFR BLD AUTO: 10 % (ref 4–12)
NEUTROPHILS # BLD AUTO: 3.82 THOUSANDS/ÂΜL (ref 1.85–7.62)
NEUTS SEG NFR BLD AUTO: 54 % (ref 43–75)
NRBC BLD AUTO-RTO: 0 /100 WBCS
P AXIS: 47 DEGREES
P AXIS: 65 DEGREES
PLATELET # BLD AUTO: 290 THOUSANDS/UL (ref 149–390)
PMV BLD AUTO: 10.2 FL (ref 8.9–12.7)
POTASSIUM SERPL-SCNC: 4.5 MMOL/L (ref 3.5–5.3)
PR INTERVAL: 152 MS
PR INTERVAL: 154 MS
PROT SERPL-MCNC: 6.9 G/DL (ref 6.4–8.4)
PROTHROMBIN TIME: 12.8 SECONDS (ref 11.6–14.5)
QRS AXIS: -28 DEGREES
QRS AXIS: -32 DEGREES
QRSD INTERVAL: 128 MS
QRSD INTERVAL: 134 MS
QT INTERVAL: 468 MS
QT INTERVAL: 484 MS
QTC INTERVAL: 522 MS
QTC INTERVAL: 529 MS
RBC # BLD AUTO: 3.91 MILLION/UL (ref 3.81–5.12)
SODIUM SERPL-SCNC: 139 MMOL/L (ref 135–147)
T WAVE AXIS: 78 DEGREES
T WAVE AXIS: 79 DEGREES
TSH SERPL DL<=0.05 MIU/L-ACNC: 3.41 UIU/ML (ref 0.45–4.5)
VENTRICULAR RATE: 70 BPM
VENTRICULAR RATE: 77 BPM
WBC # BLD AUTO: 7.11 THOUSAND/UL (ref 4.31–10.16)

## 2024-05-22 PROCEDURE — 93005 ELECTROCARDIOGRAM TRACING: CPT

## 2024-05-22 PROCEDURE — 85610 PROTHROMBIN TIME: CPT | Performed by: EMERGENCY MEDICINE

## 2024-05-22 PROCEDURE — 93010 ELECTROCARDIOGRAM REPORT: CPT | Performed by: STUDENT IN AN ORGANIZED HEALTH CARE EDUCATION/TRAINING PROGRAM

## 2024-05-22 PROCEDURE — 82948 REAGENT STRIP/BLOOD GLUCOSE: CPT

## 2024-05-22 PROCEDURE — 84484 ASSAY OF TROPONIN QUANT: CPT | Performed by: EMERGENCY MEDICINE

## 2024-05-22 PROCEDURE — 83880 ASSAY OF NATRIURETIC PEPTIDE: CPT | Performed by: EMERGENCY MEDICINE

## 2024-05-22 PROCEDURE — 99285 EMERGENCY DEPT VISIT HI MDM: CPT

## 2024-05-22 PROCEDURE — 85730 THROMBOPLASTIN TIME PARTIAL: CPT | Performed by: EMERGENCY MEDICINE

## 2024-05-22 PROCEDURE — 71045 X-RAY EXAM CHEST 1 VIEW: CPT

## 2024-05-22 PROCEDURE — 99285 EMERGENCY DEPT VISIT HI MDM: CPT | Performed by: EMERGENCY MEDICINE

## 2024-05-22 PROCEDURE — 80053 COMPREHEN METABOLIC PANEL: CPT | Performed by: EMERGENCY MEDICINE

## 2024-05-22 PROCEDURE — 36415 COLL VENOUS BLD VENIPUNCTURE: CPT | Performed by: EMERGENCY MEDICINE

## 2024-05-22 PROCEDURE — 85025 COMPLETE CBC W/AUTO DIFF WBC: CPT | Performed by: EMERGENCY MEDICINE

## 2024-05-22 PROCEDURE — 84443 ASSAY THYROID STIM HORMONE: CPT | Performed by: EMERGENCY MEDICINE

## 2024-05-22 PROCEDURE — 99223 1ST HOSP IP/OBS HIGH 75: CPT | Performed by: INTERNAL MEDICINE

## 2024-05-22 PROCEDURE — 96374 THER/PROPH/DIAG INJ IV PUSH: CPT

## 2024-05-22 RX ORDER — ACETAMINOPHEN 325 MG/1
650 TABLET ORAL EVERY 6 HOURS PRN
Status: DISCONTINUED | OUTPATIENT
Start: 2024-05-22 | End: 2024-05-28 | Stop reason: HOSPADM

## 2024-05-22 RX ORDER — LEVOTHYROXINE SODIUM 0.03 MG/1
25 TABLET ORAL
Status: DISCONTINUED | OUTPATIENT
Start: 2024-05-23 | End: 2024-05-28 | Stop reason: HOSPADM

## 2024-05-22 RX ORDER — OXYCODONE HYDROCHLORIDE AND ACETAMINOPHEN 5; 325 MG/1; MG/1
2 TABLET ORAL EVERY 6 HOURS PRN
Status: DISCONTINUED | OUTPATIENT
Start: 2024-05-22 | End: 2024-05-28 | Stop reason: HOSPADM

## 2024-05-22 RX ORDER — ASPIRIN 81 MG/1
81 TABLET, CHEWABLE ORAL DAILY
Status: DISCONTINUED | OUTPATIENT
Start: 2024-05-23 | End: 2024-05-28 | Stop reason: HOSPADM

## 2024-05-22 RX ORDER — FUROSEMIDE 20 MG/1
20 TABLET ORAL DAILY
Status: ON HOLD | COMMUNITY
End: 2024-05-28

## 2024-05-22 RX ORDER — FUROSEMIDE 10 MG/ML
40 INJECTION INTRAMUSCULAR; INTRAVENOUS ONCE
Status: COMPLETED | OUTPATIENT
Start: 2024-05-22 | End: 2024-05-22

## 2024-05-22 RX ORDER — LATANOPROST 50 UG/ML
1 SOLUTION/ DROPS OPHTHALMIC
COMMUNITY

## 2024-05-22 RX ORDER — METOPROLOL SUCCINATE 50 MG/1
50 TABLET, EXTENDED RELEASE ORAL EVERY 12 HOURS SCHEDULED
Status: DISCONTINUED | OUTPATIENT
Start: 2024-05-22 | End: 2024-05-28 | Stop reason: HOSPADM

## 2024-05-22 RX ORDER — PANTOPRAZOLE SODIUM 40 MG/1
40 TABLET, DELAYED RELEASE ORAL
Status: DISCONTINUED | OUTPATIENT
Start: 2024-05-23 | End: 2024-05-28 | Stop reason: HOSPADM

## 2024-05-22 RX ORDER — FUROSEMIDE 10 MG/ML
40 INJECTION INTRAMUSCULAR; INTRAVENOUS
Status: DISCONTINUED | OUTPATIENT
Start: 2024-05-23 | End: 2024-05-24

## 2024-05-22 RX ORDER — FERROUS SULFATE 325(65) MG
325 TABLET ORAL
Status: DISCONTINUED | OUTPATIENT
Start: 2024-05-23 | End: 2024-05-28 | Stop reason: HOSPADM

## 2024-05-22 RX ORDER — INSULIN LISPRO 100 [IU]/ML
1-5 INJECTION, SOLUTION INTRAVENOUS; SUBCUTANEOUS
Status: DISCONTINUED | OUTPATIENT
Start: 2024-05-22 | End: 2024-05-28 | Stop reason: HOSPADM

## 2024-05-22 RX ORDER — HEPARIN SODIUM 5000 [USP'U]/ML
5000 INJECTION, SOLUTION INTRAVENOUS; SUBCUTANEOUS EVERY 8 HOURS SCHEDULED
Status: DISCONTINUED | OUTPATIENT
Start: 2024-05-22 | End: 2024-05-28 | Stop reason: HOSPADM

## 2024-05-22 RX ORDER — INSULIN LISPRO 100 [IU]/ML
1-5 INJECTION, SOLUTION INTRAVENOUS; SUBCUTANEOUS
Status: DISCONTINUED | OUTPATIENT
Start: 2024-05-23 | End: 2024-05-28 | Stop reason: HOSPADM

## 2024-05-22 RX ORDER — SENNA AND DOCUSATE SODIUM 50; 8.6 MG/1; MG/1
2 TABLET, FILM COATED ORAL AS NEEDED
COMMUNITY
End: 2024-06-05

## 2024-05-22 RX ORDER — CLOPIDOGREL BISULFATE 75 MG/1
75 TABLET ORAL DAILY
Status: DISCONTINUED | OUTPATIENT
Start: 2024-05-23 | End: 2024-05-28 | Stop reason: HOSPADM

## 2024-05-22 RX ORDER — ONDANSETRON 2 MG/ML
4 INJECTION INTRAMUSCULAR; INTRAVENOUS EVERY 6 HOURS PRN
Status: DISCONTINUED | OUTPATIENT
Start: 2024-05-22 | End: 2024-05-28 | Stop reason: HOSPADM

## 2024-05-22 RX ORDER — OXYBUTYNIN CHLORIDE 10 MG/1
10 TABLET, EXTENDED RELEASE ORAL DAILY
Status: DISCONTINUED | OUTPATIENT
Start: 2024-05-23 | End: 2024-05-28 | Stop reason: HOSPADM

## 2024-05-22 RX ORDER — ATORVASTATIN CALCIUM 40 MG/1
40 TABLET, FILM COATED ORAL DAILY
Status: DISCONTINUED | OUTPATIENT
Start: 2024-05-23 | End: 2024-05-28 | Stop reason: HOSPADM

## 2024-05-22 RX ORDER — LANOLIN ALCOHOL/MO/W.PET/CERES
400 CREAM (GRAM) TOPICAL 2 TIMES DAILY
Status: DISCONTINUED | OUTPATIENT
Start: 2024-05-22 | End: 2024-05-28 | Stop reason: HOSPADM

## 2024-05-22 RX ORDER — AMOXICILLIN 250 MG
2 CAPSULE ORAL 2 TIMES DAILY
Status: DISCONTINUED | OUTPATIENT
Start: 2024-05-22 | End: 2024-05-28 | Stop reason: HOSPADM

## 2024-05-22 RX ORDER — BACLOFEN 10 MG/1
10 TABLET ORAL 2 TIMES DAILY PRN
Status: DISCONTINUED | OUTPATIENT
Start: 2024-05-22 | End: 2024-05-28 | Stop reason: HOSPADM

## 2024-05-22 RX ORDER — LATANOPROST 50 UG/ML
1 SOLUTION/ DROPS OPHTHALMIC
Status: DISCONTINUED | OUTPATIENT
Start: 2024-05-22 | End: 2024-05-28 | Stop reason: HOSPADM

## 2024-05-22 RX ORDER — MECLIZINE HCL 12.5 MG/1
25 TABLET ORAL 4 TIMES DAILY PRN
Status: DISCONTINUED | OUTPATIENT
Start: 2024-05-22 | End: 2024-05-28 | Stop reason: HOSPADM

## 2024-05-22 RX ADMIN — HEPARIN SODIUM 5000 UNITS: 5000 INJECTION INTRAVENOUS; SUBCUTANEOUS at 22:04

## 2024-05-22 RX ADMIN — SENNOSIDES AND DOCUSATE SODIUM 2 TABLET: 8.6; 5 TABLET ORAL at 22:01

## 2024-05-22 RX ADMIN — FUROSEMIDE 40 MG: 10 INJECTION, SOLUTION INTRAVENOUS at 16:16

## 2024-05-22 RX ADMIN — LATANOPROST 1 DROP: 50 SOLUTION OPHTHALMIC at 22:04

## 2024-05-22 RX ADMIN — OXYCODONE HYDROCHLORIDE AND ACETAMINOPHEN 2 TABLET: 5; 325 TABLET ORAL at 22:02

## 2024-05-22 RX ADMIN — MAGNESIUM OXIDE TAB 400 MG (241.3 MG ELEMENTAL MG) 400 MG: 400 (241.3 MG) TAB at 22:01

## 2024-05-22 RX ADMIN — METOPROLOL SUCCINATE 50 MG: 50 TABLET, EXTENDED RELEASE ORAL at 22:02

## 2024-05-22 NOTE — ASSESSMENT & PLAN NOTE
"Lab Results   Component Value Date    HGBA1C 6.5 (H) 11/26/2023       No results for input(s): \"POCGLU\" in the last 72 hours.    Blood Sugar Average: Last 72 hrs:    SSI; Subcutaneous Insulin Order Set  Blood Glucose checks TIDWM and QHS (Q6H for NPO patients)  Hold oral medications  Blood Glucose goal while inpatient is 140-180  Reduce basal insulin by 25-50% while inpatient  Consistent Carbohydrate Diet    "

## 2024-05-22 NOTE — ASSESSMENT & PLAN NOTE
Patient presents with complaints of leg swelling; noted to have 21 pound weight gain in the last 3 months  Has had similar problems in the past; denies shortness of breath or chest pain  Will initiate IV diuresis, monitor daily weights and ins and outs  Was to have echocardiogram done recently; will order while inpatient  Consider consult to cardiology if patient exhibits acute CHF symptoms  BNP noted to be 302; this appears to be her baseline  Maintain fluid restricted and low-sodium diet  Compression stocking  Well score for DVT-0; low risk

## 2024-05-22 NOTE — ED PROVIDER NOTES
"History  Chief Complaint   Patient presents with    Leg Swelling     Pt c/o bilateral leg swelling for a few weeks, worse over the past week. Pt also c/o \"blisters\", weeping, and pain in bilateral legs. Denies sob, cp.     79-year-old female from a nursing home with history of CVA on Plavix and aspirin, hypertension, diabetes, CAD presents to the ED with bilateral lower extremity swelling x 1 week.  She states the swelling has progressively become worse causing pain.  She has noticed weeping from the legs.  She is able to ambulate with pain.  No chest pain or shortness of breath.  No change in her medications.      History provided by:  Patient and medical records   used: No    Leg Pain  Location:  Leg  Time since incident:  1 week  Injury: no    Leg location:  L lower leg and R lower leg  Chronicity:  New  Relieved by:  Nothing  Worsened by:  Nothing  Ineffective treatments: Lasix.  Associated symptoms: swelling    Associated symptoms: no back pain, no decreased ROM, no fatigue, no fever and no numbness    Swelling:     Location:  Leg    Onset quality:  Gradual    Duration:  1 week    Timing:  Constant    Progression:  Worsening    Chronicity:  New  Risk factors: no recent illness        Prior to Admission Medications   Prescriptions Last Dose Informant Patient Reported? Taking?   Blood Glucose Monitoring Suppl (OneTouch Verio Reflect) w/Device KIT  Self No No   Sig: Check blood sugars twice daily. Please substitute with appropriate alternative as covered by patient's insurance. Dx: E11.65   [employer] Mucus Extended Release 600 MG 12 hr tablet  Self Yes No   Sig: Take 600 mg by mouth 2 (two) times a day   Patient not taking: Reported on 4/24/2024   Milnacipran HCl (Savella) 100 MG TABS  Self No No   Sig: Take 1 tablet (100 mg total) by mouth daily   OneTouch Delica Lancets 33G MISC  Self No No   Sig: Check blood sugars twice daily. Please substitute with appropriate alternative as covered by patient's " insurance. Dx: E11.65   albuterol (PROVENTIL HFA,VENTOLIN HFA) 90 mcg/act inhaler  Self No No   Sig: Inhale 2 puffs every 6 (six) hours as needed for wheezing or shortness of breath   amLODIPine (NORVASC) 5 mg tablet   No No   Sig: Take 1 tablet (5 mg total) by mouth daily   aspirin 81 mg chewable tablet  Self Yes No   Sig: Chew 81 mg daily   Patient not taking: Reported on 4/24/2024   atorvastatin (LIPITOR) 40 mg tablet  Self No No   Sig: TAKE ONE TABLET BY MOUTH ONCE DAILY   baclofen 10 mg tablet  Self Yes No   Sig: Take 10 mg by mouth 2 (two) times a day as needed for muscle spasms   cholestyramine (QUESTRAN) 4 g packet  Self No No   Sig: Take 1 packet (4 g total) by mouth 3 (three) times a day with meals   Patient not taking: Reported on 4/24/2024   clopidogrel (Plavix) 75 mg tablet   No No   Sig: Take 1 tablet (75 mg total) by mouth daily   doxycycline hyclate (VIBRAMYCIN) 100 mg capsule  Self Yes No   Sig: Take 100 mg by mouth 2 (two) times a day   Patient not taking: Reported on 4/24/2024   ferrous sulfate 325 (65 Fe) mg tablet  Self No No   Sig: Take 1 tablet (325 mg total) by mouth daily with breakfast   glucose blood (OneTouch Verio) test strip  Self No No   Sig: Check blood sugars twice daily. Please substitute with appropriate alternative as covered by patient's insurance. Dx: E11.65   levothyroxine 75 mcg tablet   No No   Sig: Take 0.5 tablets (37.5 mcg total) by mouth daily in the early morning   meclizine (ANTIVERT) 25 mg tablet   No No   Sig: Take 1 tablet (25 mg total) by mouth 4 (four) times a day as needed for dizziness   metFORMIN (GLUCOPHAGE) 1000 MG tablet  Self No No   Sig: Take 1 tablet (1,000 mg total) by mouth 2 (two) times a day with meals   metoprolol succinate (TOPROL-XL) 50 mg 24 hr tablet  Self No No   Sig: Take 1 tablet (50 mg total) by mouth every 12 (twelve) hours   oxyCODONE-acetaminophen (PERCOCET)  mg per tablet  Self Yes No   Sig: Take 1 tablet by mouth every 6 (six) hours  as needed for moderate pain   pantoprazole (PROTONIX) 40 mg tablet   No No   Sig: Take 1 tablet (40 mg total) by mouth daily   trospium (SANCTURA XR) 60 mg 24 hr capsule  Self No No   Sig: Take 1 capsule (60 mg total) by mouth daily before breakfast      Facility-Administered Medications: None       Past Medical History:   Diagnosis Date    Acid reflux     Acute kidney injury (HCC)     Anemia     hx of iron-deficient    Anxiety     Arthritis     Asthma     last needed inhaler last year 2020    Basal cell carcinoma     upper lip    Chronic narcotic dependence (HCC)     Chronic pain     Colon polyp     Cystocele     Diabetes mellitus (HCC)     stable    Disease of thyroid gland     hypothyroidism    Diverticulosis     Dizziness     at times    Dysfunctional uterine bleeding     last assessed - [id number]    Dysphagia     Fibromyalgia     Gastric ulcer     Gastroparesis     History of colonic polyps     last assessed - [id number]    History of gastroesophageal reflux (GERD)     Hypercholesterolemia     Hyperlipidemia     Hypertension     Hyponatremia     IBS (irritable bowel syndrome)     Pneumobilia 06/18/2022    Post laminectomy syndrome     S/P insertion of spinal cord stimulator 07/18/2018    s/p [employer]onic loop recorder 3/2/2024 03/02/2024    Seasonal allergies     Shortness of breath     exertional    Spinal stenosis     Status post lumbar spinal fusion 03/16/2018    Stroke ([facility])     pt states slight stroke March 2022       Past Surgical History:   Procedure Laterality Date    APPENDECTOMY      BACK SURGERY      BREAST CYST EXCISION Left     CARDIAC CATHETERIZATION  11/14/2023    Procedure: Cardiac catheterization;  Surgeon: Randolph Holt MD;  [facility];  Service: Cardiology    CARDIAC CATHETERIZATION N/A 11/14/2023    Procedure: Cardiac Coronary Angiogram;  Surgeon: Randolph Holt MD;  [facility];  Service: Cardiology    CARDIAC ELECTROPHYSIOLOGY PROCEDURE N/A 3/2/2024     Procedure: Cardiac loop recorder implant;  Surgeon: Edu Fontaine MD;  [facility];  Service: Cardiology    CHOLECYSTECTOMY      COLONOSCOPY      ESOPHAGOGASTRODUODENOSCOPY N/A 09/28/2016    Procedure: ESOPHAGOGASTRODUODENOSCOPY (EGD);  Surgeon: Mylene Moeller MD;  Location: [facility];  Service:     HERNIA REPAIR      HYSTERECTOMY      TTAH-BSO age 28    LAMINECTOMY      LUMBAR LAMINECTOMY      OOPHORECTOMY      age 28    NE ARTHRODESIS POSTERIOR/PSTLAT TQ 1NTRSPC THORACIC N/A 06/04/2018    Procedure: Reopening of lumbar incision for T12-L5 posterior instrumented fixation and fusion and T12-L4 posterior decompression;  Surgeon: Wood Rogel MD;  Location: [facility];  Service: Neurosurgery    NE COLONOSCOPY FLX DX W/COLLJ SPEC WHEN PFRMD N/A 03/02/2016    Procedure: EGD AND COLONOSCOPY;  Surgeon: Mylene Moeller MD;  [facility]: [facility];  Service: Gastroenterology    NE DILATION ESOPH UNGUIDED SOUND/BOUGIE 1/MULT PASS N/A 09/28/2016    Procedure: DILATATION ESOPHAGEAL;  Surgeon: Mylene Moeller MD;  [facility]: [facility];  Service: Gastroenterology    NE ESOPHAGOGASTRODUODENOSCOPY TRANSORAL DIAGNOSTIC N/A 07/18/2016    Procedure: ESOPHAGOGASTRODUODENOSCOPY (EGD);  Surgeon: Mylene Moeller MD;  [facility];  Service: Gastroenterology    NE INSJ/RPLCMT SPINAL NPG/RCVR POCKET CRTJ&CONNJ Left 06/04/2018    Procedure: removal of left buttock implantable pulse generator and placement of new  implantable pulse generator;  Surgeon: Wood Rogel MD;  Location: [facility];  Service: Neurosurgery       Family History   Problem Relation Age of Onset    Lung cancer Mother 46    Pulmonary embolism Father     No Known Problems Sister     No Known Problems Daughter     No Known Problems Daughter     Stroke Maternal Grandmother     Heart attack Maternal Grandfather     No Known Problems Paternal Grandmother     No Known Problems Paternal Grandfather     No Known Problems Maternal Aunt     Diabetes Family          Diabetes mellitus    Hypertension Family     Stroke Family         Stroke complications     I have reviewed and agree with the history as documented.    E-Cigarette/Vaping     E-Cigarette/Vaping Substances    Nicotine No     THC No     CBD No     Flavoring No     Other No     Unknown No      Social History     Tobacco Use    Smoking status: Former     Current packs/day: 0.00     Types: Cigarettes     Quit date: 1970     Years since quittin.4    Smokeless tobacco: Never    Tobacco comments:     Denied history of current ever day smoker, Former smoker and Never smoker all documented in [employer]   Substance Use Topics    Alcohol use: Not Currently     Comment: Denied history of alcohol use    Drug use: No     Comment: Denied history of drug use       Review of Systems   Constitutional: Negative.  Negative for chills, diaphoresis, fatigue and fever.   HENT: Negative.  Negative for congestion, rhinorrhea and sore throat.    Eyes: Negative.  Negative for discharge, redness and itching.   Respiratory: Negative.  Negative for apnea, cough, chest tightness, shortness of breath and wheezing.    Cardiovascular:  Positive for leg swelling. Negative for chest pain and palpitations.   Gastrointestinal: Negative.  Negative for abdominal pain.   Endocrine: Negative.    Genitourinary: Negative.  Negative for flank pain, frequency and urgency.   Musculoskeletal: Negative.  Negative for back pain.   Skin: Negative.    Allergic/Immunologic: Negative.    Neurological: Negative.  Negative for dizziness, syncope, weakness, light-headedness, numbness and headaches.   Hematological: Negative.    All other systems reviewed and are negative.      Physical Exam  Physical Exam  Vitals and nursing note reviewed.   Constitutional:       General: She is awake. She is not in acute distress.     Appearance: Normal appearance. She is well-developed and overweight. She is not ill-appearing, toxic-appearing or diaphoretic.   HENT:       Head: Normocephalic and atraumatic.      Right Ear: External ear normal.      Left Ear: External ear normal.      Nose: Nose normal.      Mouth/Throat:      Mouth: Oropharynx is clear and moist. Mucous membranes are moist.   Eyes:      General: No scleral icterus.        Right eye: No discharge.         Left eye: No discharge.      Extraocular Movements: EOM normal.      Conjunctiva/sclera: Conjunctivae normal.   Neck:      Thyroid: No thyromegaly.      Vascular: No JVD.      Trachea: No tracheal deviation.   Cardiovascular:      Rate and Rhythm: Normal rate. Rhythm irregular.      Heart sounds: Normal heart sounds. No murmur heard.     No friction rub. No gallop.   Pulmonary:      Effort: Pulmonary effort is normal. No tachypnea or respiratory distress.      Breath sounds: Normal breath sounds. No stridor. No wheezing, rhonchi or rales.   Chest:      Chest wall: No tenderness.   Abdominal:      General: Bowel sounds are normal. There is no distension.      Palpations: Abdomen is soft. There is no mass.      Tenderness: There is no abdominal tenderness.      Hernia: No hernia is present.   Musculoskeletal:         General: No tenderness or deformity. Normal range of motion.      Right lower leg: Edema (+2 pitting edema to the knee) present.      Left lower leg: Edema (+2 pitting edema to the knee) present.      Comments: Some weeping noted to bilateral lower extremities below the knee.  Superficial ulceration to right lower extremity without purulent drainage or surrounding erythema.  The legs are otherwise warm.  No concern for ischemia.   Skin:     General: Skin is warm and dry.      Coloration: Skin is not jaundiced or pale.      Findings: No bruising, erythema, lesion or rash.   Neurological:      General: No focal deficit present.      Mental Status: She is alert and oriented to person, place, and time.      Motor: No weakness or abnormal muscle tone.      Deep Tendon Reflexes: Reflexes are normal and  symmetric.   Psychiatric:         Mood and Affect: Mood and affect and mood normal.         Behavior: Behavior is cooperative.         Vital Signs  ED Triage Vitals [05/22/24 1456]   Temperature Pulse Respirations Blood Pressure SpO2   98.6 °F (37 °C) 70 18 113/57 99 %      Temp Source Heart Rate Source Patient Position - Orthostatic VS BP Location FiO2 (%)   Oral Monitor Sitting Right arm --      Pain Score       --           Vitals:    05/22/24 1456   BP: 113/57   Pulse: 70   Patient Position - Orthostatic VS: Sitting         Visual Acuity      ED Medications  Medications   furosemide (LASIX) injection 40 mg (has no administration in time range)       Diagnostic Studies  Results Reviewed       Procedure Component Value Units Date/Time    CBC and differential [[id number]]     Lab Status: No result Specimen: Blood     Protime-INR [[id number]]     Lab Status: No result Specimen: Blood     APTT [[id number]]     Lab Status: No result Specimen: Blood     Comprehensive metabolic panel [[id number]]     Lab Status: No result Specimen: Blood     TSH, 3rd generation with Free T4 reflex [[id number]]     Lab Status: No result Specimen: Blood     HS Troponin 0hr (reflex protocol) [[id number]]     Lab Status: No result Specimen: Blood     B-Type Natriuretic Peptide(BNP) [[id number]]     Lab Status: No result Specimen: Blood                    No orders to display              Procedures  ECG 12 Lead Documentation Only    Date/Time: 5/22/2024 4:04 PM    Performed by: Nicky Johnson DO  Authorized by: Nicky Johnson DO    Indications / Diagnosis:  Leg swelling  ECG reviewed by me, the ED Provider: yes    Patient location:  ED  Previous ECG:     Previous ECG:  Compared to current    Comparison ECG info:  1/13/24    Similarity:  No change  Interpretation:     Interpretation: abnormal    Rate:     ECG rate:  77    ECG rate assessment: normal    Rhythm:     Rhythm: sinus rhythm    Ectopy:     Ectopy: PAC    Conduction:      Conduction: abnormal      Abnormal conduction: non-specific intraventricular conduction delay    ST segments:     ST segments:  Normal  T waves:     T waves: normal             ED Course  ED Course as of 05/22/24 1648   Wed May 22, 2024   1604 WBC: 7.11   1604 Hemoglobin(!): 11.2   1615 BNP(!): 302   1616 Sodium: 139   1616 Creatinine(!): 1.35  Baseline approximately 1   1616 hs TnI 0hr: 17   1646 TSH 3RD GENERATON: 3.406   1647 Chest x-ray: Mild interstitial edema                                             Medical Decision Making  79-year-old female from a nursing home presents to the emergency department with a 1 week history of worsening bilateral lower extremity edema and now leg pain and weeping of both legs.  She is still ambulatory.  No chest pain or shortness of breath.  She does take Lasix but this has not been helping.  On exam she looks well she is in no acute distress.  She does have irregularity on heart exam.  Lungs are clear.  She has bilateral lower extremity edema below the knee with some weeping and a shallow ulcer to the right lower extremity below the knee.  The legs are otherwise warm without any concern for infection or ischemic leg.  Doubt DVT given bilateral pitting edema.  Will do cardiac workup to rule out CHF.  Will give dose of IV Lasix here.  Cardiac cath 11/14/2023:  Diffuse calcified CAD. No focal significant lesions.  ·  1st Mrg lesion is 70% stenosed.  ·  First Obtuse Marginal Branch 1st Mrg lesion is 70% stenosed. The vessel size is medium. LETY flow is 3. The lesion is non high-C and focal. Proximal vessel. iFR was measured in the 1st Mrg. iFR ratio: 0.95. The iFR was nonsignificant, not intervened.  Echo 11/12/2023:Left Ventricle: Wall thickness is not well visualized. The left ventricular ejection fraction is 70%. Systolic function is hyperdynamic. Although no diagnostic regional wall motion abnormality was identified, this possibility cannot be completely excluded on the basis  of this study. Diastolic function is mildly abnormal, consistent with grade I (abnormal) relaxation. There is outflow tract dynamic obstruction with valsalva with a peak gradient of 117 mmHg.  ·  Right Ventricle: Right ventricular cavity size is normal. Systolic function is normal.  ·  Left Atrium: The atrium is mildly dilated.  ·  Mitral Valve: There is mild annular calcification.         Amount and/or Complexity of Data Reviewed  Labs: ordered. Decision-making details documented in ED Course.  Radiology: ordered.  ECG/medicine tests: ordered and independent interpretation performed.    Risk  Prescription drug management.             Disposition  Final diagnoses:   None     ED Disposition       None          Follow-up Information    None         Patient's Medications   Discharge Prescriptions    No medications on file       No discharge procedures on file.    PDMP Review         Value Time User    PDMP Reviewed  Yes 11/26/2023  3:43 PM Julian Low MD            ED Provider  Electronically Signed by             Nicky Johnson, DO  05/22/24 [id number]       Nicky Johnson, DO  05/22/24 [id number]

## 2024-05-22 NOTE — ASSESSMENT & PLAN NOTE
History of COPD; currently 94% SpO2 on room air  Denies symptoms of shortness of breath/dyspnea on exertion; not in acute exacerbation  Continue home therapies

## 2024-05-22 NOTE — ASSESSMENT & PLAN NOTE
Blood pressure well-controlled on admission  Will hold amlodipine in the setting of leg swelling; continue Toprol 50 mg twice daily

## 2024-05-22 NOTE — PLAN OF CARE
Problem: PAIN - ADULT  Goal: Verbalizes/displays adequate comfort level or baseline comfort level  Description: Interventions:  - Encourage patient to monitor pain and request assistance  - Assess pain using appropriate pain scale  - Administer analgesics based on type and severity of pain and evaluate response  - Implement non-pharmacological measures as appropriate and evaluate response  - Consider cultural and social influences on pain and pain management  - Notify physician/advanced practitioner if interventions unsuccessful or patient reports new pain  Outcome: Progressing     Problem: INFECTION - ADULT  Goal: Absence or prevention of progression during hospitalization  Description: INTERVENTIONS:  - Assess and monitor for signs and symptoms of infection  - Monitor lab/diagnostic results  - Monitor all insertion sites, i.e. indwelling lines, tubes, and drains  - Monitor endotracheal if appropriate and nasal secretions for changes in amount and color  - Beggs appropriate cooling/warming therapies per order  - Administer medications as ordered  - Instruct and encourage patient and family to use good hand hygiene technique  - Identify and instruct in appropriate isolation precautions for identified infection/condition  Outcome: Progressing  Goal: Absence of fever/infection during neutropenic period  Description: INTERVENTIONS:  - Monitor WBC    Outcome: Progressing     Problem: SAFETY ADULT  Goal: Patient will remain free of falls  Description: INTERVENTIONS:  - Educate patient/family on patient safety including physical limitations  - Instruct patient to call for assistance with activity   - Consult OT/PT to assist with strengthening/mobility   - Keep Call bell within reach  - Keep bed low and locked with side rails adjusted as appropriate  - Keep care items and personal belongings within reach  - Initiate and maintain comfort rounds  - Make Fall Risk Sign visible to staff  - Offer Toileting every  Hours,  in advance of need  - Initiate/Maintain alarm  - Obtain necessary fall risk management equipment:   - Apply yellow socks and bracelet for high fall risk patients  - Consider moving patient to room near nurses station  Outcome: Progressing  Goal: Maintain or return to baseline ADL function  Description: INTERVENTIONS:  -  Assess patient's ability to carry out ADLs; assess patient's baseline for ADL function and identify physical deficits which impact ability to perform ADLs (bathing, care of mouth/teeth, toileting, grooming, dressing, etc.)  - Assess/evaluate cause of self-care deficits   - Assess range of motion  - Assess patient's mobility; develop plan if impaired  - Assess patient's need for assistive devices and provide as appropriate  - Encourage maximum independence but intervene and supervise when necessary  - Involve family in performance of ADLs  - Assess for home care needs following discharge   - Consider OT consult to assist with ADL evaluation and planning for discharge  - Provide patient education as appropriate  Outcome: Progressing  Goal: Maintains/Returns to pre admission functional level  Description: INTERVENTIONS:  - Perform AM-PAC 6 Click Basic Mobility/ Daily Activity assessment daily.  - Set and communicate daily mobility goal to care team and patient/family/caregiver.   - Collaborate with rehabilitation services on mobility goals if consulted  - Perform Range of Motion  times a day.  - Reposition patient every  hours.  - Dangle patient  times a day  - Stand patient  times a day  - Ambulate patient  times a day  - Out of bed to chair  times a day   - Out of bed for meals times a day  - Out of bed for toileting  - Record patient progress and toleration of activity level   Outcome: Progressing     Problem: DISCHARGE PLANNING  Goal: Discharge to home or other facility with appropriate resources  Description: INTERVENTIONS:  - Identify barriers to discharge w/patient and caregiver  - Arrange for  needed discharge resources and transportation as appropriate  - Identify discharge learning needs (meds, wound care, etc.)  - Arrange for interpretive services to assist at discharge as needed  - Refer to Case Management Department for coordinating discharge planning if the patient needs post-hospital services based on physician/advanced practitioner order or complex needs related to functional status, cognitive ability, or social support system  Outcome: Progressing     Problem: Knowledge Deficit  Goal: Patient/family/caregiver demonstrates understanding of disease process, treatment plan, medications, and discharge instructions  Description: Complete learning assessment and assess knowledge base.  Interventions:  - Provide teaching at level of understanding  - Provide teaching via preferred learning methods  Outcome: Progressing

## 2024-05-22 NOTE — ASSESSMENT & PLAN NOTE
Lab Results   Component Value Date    EGFR 36 05/22/2024    EGFR 47 05/01/2024    EGFR 63 03/26/2024    CREATININE 1.35 (H) 05/22/2024    CREATININE 1.10 05/01/2024    CREATININE 0.86 03/26/2024     Patient with history of CKD 3; baseline creatinine is approximately 1.0  Currently near baseline; monitor on morning labs  May be slightly elevated in the setting of fluid overload; initiate diuresis and monitor for improvement

## 2024-05-22 NOTE — ASSESSMENT & PLAN NOTE
Patient with history of CVA; continue aspirin, Plavix, Lipitor  Denies stroke-like symptoms, monitor

## 2024-05-23 ENCOUNTER — APPOINTMENT (INPATIENT)
Dept: NON INVASIVE DIAGNOSTICS | Facility: HOSPITAL | Age: 79
DRG: 291 | End: 2024-05-23
Payer: MEDICARE

## 2024-05-23 LAB
ALBUMIN SERPL BCP-MCNC: 3.4 G/DL (ref 3.5–5)
ALP SERPL-CCNC: 103 U/L (ref 34–104)
ALT SERPL W P-5'-P-CCNC: 7 U/L (ref 7–52)
ANION GAP SERPL CALCULATED.3IONS-SCNC: 7 MMOL/L (ref 4–13)
AORTIC ROOT: 2.8 CM
APICAL FOUR CHAMBER EJECTION FRACTION: 55 %
AST SERPL W P-5'-P-CCNC: 14 U/L (ref 13–39)
AV PEAK GRADIENT: 18 MMHG
BASOPHILS # BLD AUTO: 0.04 THOUSANDS/ÂΜL (ref 0–0.1)
BASOPHILS NFR BLD AUTO: 1 % (ref 0–1)
BILIRUB SERPL-MCNC: 0.27 MG/DL (ref 0.2–1)
BSA FOR ECHO PROCEDURE: 1.74 M2
BUN SERPL-MCNC: 26 MG/DL (ref 5–25)
CALCIUM ALBUM COR SERPL-MCNC: 9.5 MG/DL (ref 8.3–10.1)
CALCIUM SERPL-MCNC: 9 MG/DL (ref 8.4–10.2)
CHLORIDE SERPL-SCNC: 102 MMOL/L (ref 96–108)
CO2 SERPL-SCNC: 32 MMOL/L (ref 21–32)
CREAT SERPL-MCNC: 1.27 MG/DL (ref 0.6–1.3)
DOP CALC LVOT AREA: 3.14 CM2
DOP CALC LVOT DIAMETER: 2 CM
E WAVE DECELERATION TIME: 273 MS
E/A RATIO: 0.8
EOSINOPHIL # BLD AUTO: 0.29 THOUSAND/ÂΜL (ref 0–0.61)
EOSINOPHIL NFR BLD AUTO: 6 % (ref 0–6)
ERYTHROCYTE [DISTWIDTH] IN BLOOD BY AUTOMATED COUNT: 13.5 % (ref 11.6–15.1)
FRACTIONAL SHORTENING: 32 (ref 28–44)
GFR SERPL CREATININE-BSD FRML MDRD: 39 ML/MIN/1.73SQ M
GLUCOSE SERPL-MCNC: 122 MG/DL (ref 65–140)
GLUCOSE SERPL-MCNC: 129 MG/DL (ref 65–140)
GLUCOSE SERPL-MCNC: 130 MG/DL (ref 65–140)
GLUCOSE SERPL-MCNC: 150 MG/DL (ref 65–140)
GLUCOSE SERPL-MCNC: 207 MG/DL (ref 65–140)
HCT VFR BLD AUTO: 33.3 % (ref 34.8–46.1)
HGB BLD-MCNC: 10.6 G/DL (ref 11.5–15.4)
IMM GRANULOCYTES # BLD AUTO: 0.01 THOUSAND/UL (ref 0–0.2)
IMM GRANULOCYTES NFR BLD AUTO: 0 % (ref 0–2)
INTERVENTRICULAR SEPTUM IN DIASTOLE (PARASTERNAL SHORT AXIS VIEW): 1.2 CM
INTERVENTRICULAR SEPTUM: 1.2 CM (ref 0.6–1.1)
LAAS-AP2: 16 CM2
LAAS-AP4: 15.6 CM2
LEFT ATRIUM SIZE: 2.9 CM
LEFT ATRIUM VOLUME (MOD BIPLANE): 39 ML
LEFT ATRIUM VOLUME INDEX (MOD BIPLANE): 22.4 ML/M2
LEFT INTERNAL DIMENSION IN SYSTOLE: 2.8 CM (ref 2.1–4)
LEFT VENTRICULAR INTERNAL DIMENSION IN DIASTOLE: 4.1 CM (ref 3.5–6)
LEFT VENTRICULAR POSTERIOR WALL IN END DIASTOLE: 1.1 CM
LEFT VENTRICULAR STROKE VOLUME: 42 ML
LVSV (TEICH): 42 ML
LYMPHOCYTES # BLD AUTO: 1.99 THOUSANDS/ÂΜL (ref 0.6–4.47)
LYMPHOCYTES NFR BLD AUTO: 40 % (ref 14–44)
MAGNESIUM SERPL-MCNC: 2.1 MG/DL (ref 1.9–2.7)
MCH RBC QN AUTO: 28.7 PG (ref 26.8–34.3)
MCHC RBC AUTO-ENTMCNC: 31.8 G/DL (ref 31.4–37.4)
MCV RBC AUTO: 90 FL (ref 82–98)
MONOCYTES # BLD AUTO: 0.53 THOUSAND/ÂΜL (ref 0.17–1.22)
MONOCYTES NFR BLD AUTO: 11 % (ref 4–12)
MV E'TISSUE VEL-LAT: 8 CM/S
MV E'TISSUE VEL-SEP: 8 CM/S
MV PEAK A VEL: 1.14 M/S
MV PEAK E VEL: 91 CM/S
MV STENOSIS PRESSURE HALF TIME: 79 MS
MV VALVE AREA P 1/2 METHOD: 2.78
NEUTROPHILS # BLD AUTO: 2.12 THOUSANDS/ÂΜL (ref 1.85–7.62)
NEUTS SEG NFR BLD AUTO: 42 % (ref 43–75)
NRBC BLD AUTO-RTO: 0 /100 WBCS
PHOSPHATE SERPL-MCNC: 4.7 MG/DL (ref 2.3–4.1)
PLATELET # BLD AUTO: 258 THOUSANDS/UL (ref 149–390)
PMV BLD AUTO: 10.3 FL (ref 8.9–12.7)
POTASSIUM SERPL-SCNC: 4.2 MMOL/L (ref 3.5–5.3)
PROT SERPL-MCNC: 6 G/DL (ref 6.4–8.4)
RBC # BLD AUTO: 3.69 MILLION/UL (ref 3.81–5.12)
RIGHT ATRIAL 2D VOLUME: 30 ML
RIGHT ATRIUM AREA SYSTOLE A4C: 14.7 CM2
RIGHT VENTRICLE ID DIMENSION: 2.6 CM
SL CV LEFT ATRIUM LENGTH A2C: 5.4 CM
SL CV LV EF: 70
SL CV PED ECHO LEFT VENTRICLE DIASTOLIC VOLUME (MOD BIPLANE) 2D: 73 ML
SL CV PED ECHO LEFT VENTRICLE SYSTOLIC VOLUME (MOD BIPLANE) 2D: 31 ML
SODIUM SERPL-SCNC: 141 MMOL/L (ref 135–147)
TR MAX PG: 26 MMHG
TR PEAK VELOCITY: 2.5 M/S
TRICUSPID ANNULAR PLANE SYSTOLIC EXCURSION: 2 CM
TRICUSPID VALVE PEAK REGURGITATION VELOCITY: 2.53 M/S
WBC # BLD AUTO: 4.98 THOUSAND/UL (ref 4.31–10.16)

## 2024-05-23 PROCEDURE — 84100 ASSAY OF PHOSPHORUS: CPT | Performed by: INTERNAL MEDICINE

## 2024-05-23 PROCEDURE — 93306 TTE W/DOPPLER COMPLETE: CPT

## 2024-05-23 PROCEDURE — 97162 PT EVAL MOD COMPLEX 30 MIN: CPT

## 2024-05-23 PROCEDURE — 83735 ASSAY OF MAGNESIUM: CPT | Performed by: INTERNAL MEDICINE

## 2024-05-23 PROCEDURE — 82948 REAGENT STRIP/BLOOD GLUCOSE: CPT

## 2024-05-23 PROCEDURE — 97166 OT EVAL MOD COMPLEX 45 MIN: CPT | Performed by: OCCUPATIONAL THERAPIST

## 2024-05-23 PROCEDURE — 99232 SBSQ HOSP IP/OBS MODERATE 35: CPT | Performed by: PHYSICIAN ASSISTANT

## 2024-05-23 PROCEDURE — 93306 TTE W/DOPPLER COMPLETE: CPT | Performed by: INTERNAL MEDICINE

## 2024-05-23 PROCEDURE — 80053 COMPREHEN METABOLIC PANEL: CPT | Performed by: INTERNAL MEDICINE

## 2024-05-23 PROCEDURE — 85025 COMPLETE CBC W/AUTO DIFF WBC: CPT | Performed by: INTERNAL MEDICINE

## 2024-05-23 RX ORDER — HYDROMORPHONE HCL IN WATER/PF 6 MG/30 ML
0.2 PATIENT CONTROLLED ANALGESIA SYRINGE INTRAVENOUS EVERY 6 HOURS PRN
Status: DISCONTINUED | OUTPATIENT
Start: 2024-05-23 | End: 2024-05-28 | Stop reason: HOSPADM

## 2024-05-23 RX ADMIN — OXYCODONE HYDROCHLORIDE AND ACETAMINOPHEN 2 TABLET: 5; 325 TABLET ORAL at 04:03

## 2024-05-23 RX ADMIN — MAGNESIUM OXIDE TAB 400 MG (241.3 MG ELEMENTAL MG) 400 MG: 400 (241.3 MG) TAB at 22:00

## 2024-05-23 RX ADMIN — MAGNESIUM OXIDE TAB 400 MG (241.3 MG ELEMENTAL MG) 400 MG: 400 (241.3 MG) TAB at 09:22

## 2024-05-23 RX ADMIN — HEPARIN SODIUM 5000 UNITS: 5000 INJECTION INTRAVENOUS; SUBCUTANEOUS at 22:00

## 2024-05-23 RX ADMIN — BACLOFEN 10 MG: 10 TABLET ORAL at 02:35

## 2024-05-23 RX ADMIN — LEVOTHYROXINE SODIUM 25 MCG: 25 TABLET ORAL at 05:48

## 2024-05-23 RX ADMIN — OXYCODONE HYDROCHLORIDE AND ACETAMINOPHEN 2 TABLET: 5; 325 TABLET ORAL at 20:03

## 2024-05-23 RX ADMIN — HEPARIN SODIUM 5000 UNITS: 5000 INJECTION INTRAVENOUS; SUBCUTANEOUS at 13:48

## 2024-05-23 RX ADMIN — FERROUS SULFATE TAB 325 MG (65 MG ELEMENTAL FE) 325 MG: 325 (65 FE) TAB at 09:22

## 2024-05-23 RX ADMIN — CLOPIDOGREL BISULFATE 75 MG: 75 TABLET ORAL at 09:22

## 2024-05-23 RX ADMIN — ATORVASTATIN CALCIUM 40 MG: 40 TABLET, FILM COATED ORAL at 09:22

## 2024-05-23 RX ADMIN — FUROSEMIDE 40 MG: 10 INJECTION, SOLUTION INTRAMUSCULAR; INTRAVENOUS at 17:42

## 2024-05-23 RX ADMIN — SENNOSIDES AND DOCUSATE SODIUM 2 TABLET: 8.6; 5 TABLET ORAL at 22:00

## 2024-05-23 RX ADMIN — OXYBUTYNIN CHLORIDE 10 MG: 10 TABLET, EXTENDED RELEASE ORAL at 09:22

## 2024-05-23 RX ADMIN — ASPIRIN 81 MG CHEWABLE TABLET 81 MG: 81 TABLET CHEWABLE at 09:22

## 2024-05-23 RX ADMIN — PANTOPRAZOLE SODIUM 40 MG: 40 TABLET, DELAYED RELEASE ORAL at 05:48

## 2024-05-23 RX ADMIN — HYDROMORPHONE HYDROCHLORIDE 0.2 MG: 0.2 INJECTION, SOLUTION INTRAMUSCULAR; INTRAVENOUS; SUBCUTANEOUS at 22:01

## 2024-05-23 RX ADMIN — FUROSEMIDE 40 MG: 10 INJECTION, SOLUTION INTRAMUSCULAR; INTRAVENOUS at 09:28

## 2024-05-23 RX ADMIN — ACETAMINOPHEN 650 MG: 325 TABLET, FILM COATED ORAL at 09:22

## 2024-05-23 RX ADMIN — LATANOPROST 1 DROP: 50 SOLUTION OPHTHALMIC at 22:00

## 2024-05-23 RX ADMIN — METOPROLOL SUCCINATE 50 MG: 50 TABLET, EXTENDED RELEASE ORAL at 22:00

## 2024-05-23 RX ADMIN — HEPARIN SODIUM 5000 UNITS: 5000 INJECTION INTRAVENOUS; SUBCUTANEOUS at 05:47

## 2024-05-23 RX ADMIN — INSULIN LISPRO 1 UNITS: 100 INJECTION, SOLUTION INTRAVENOUS; SUBCUTANEOUS at 12:21

## 2024-05-23 RX ADMIN — OXYCODONE HYDROCHLORIDE AND ACETAMINOPHEN 2 TABLET: 5; 325 TABLET ORAL at 13:53

## 2024-05-23 RX ADMIN — ACETAMINOPHEN 650 MG: 325 TABLET, FILM COATED ORAL at 15:55

## 2024-05-23 RX ADMIN — HYDROMORPHONE HYDROCHLORIDE 0.2 MG: 0.2 INJECTION, SOLUTION INTRAMUSCULAR; INTRAVENOUS; SUBCUTANEOUS at 06:16

## 2024-05-23 RX ADMIN — INSULIN LISPRO 1 UNITS: 100 INJECTION, SOLUTION INTRAVENOUS; SUBCUTANEOUS at 17:42

## 2024-05-23 NOTE — ASSESSMENT & PLAN NOTE
Lab Results   Component Value Date    HGBA1C 6.5 (H) 11/26/2023     Recent Labs     05/22/24  2110 05/23/24  0731 05/23/24  1112   POCGLU 137 130 207*   Hold oral metformin   Placed on insulin sliding scale

## 2024-05-23 NOTE — UTILIZATION REVIEW
"Initial Clinical Review    Admission: Date/Time/Statement:   Admission Orders (From admission, onward)       Ordered        05/22/24 1805  INPATIENT ADMISSION  Once                          Orders Placed This Encounter   Procedures    INPATIENT ADMISSION     Standing Status:   Standing     Number of Occurrences:   1     Order Specific Question:   Level of Care     Answer:   Med Surg [16]     Order Specific Question:   Estimated length of stay     Answer:   More than 2 Midnights     Order Specific Question:   Certification     Answer:   I certify that inpatient services are medically necessary for this patient for a duration of greater than two midnights. See H&P and MD Progress Notes for additional information about the patient's course of treatment.     ED Arrival Information       Expected   -    Arrival   5/22/2024 14:51    Acuity   Urgent              Means of arrival   Ambulance    Escorted by   [facility] ([facility])    Service   Hospitalist    Admission type   Emergency              Arrival complaint   swelling             Chief Complaint   Patient presents with    Leg Swelling     Pt c/o bilateral leg swelling for a few weeks, worse over the past week. Pt also c/o \"blisters\", weeping, and pain in bilateral legs. Denies sob, cp.       Initial Presentation: 79 y.o. female to ED from NH via EMS  PMH of CVA, IBS, hyperlipidemia, GERD, gastric ulcer, type 2 diabetes, gastroparesis, and anemia who presents with bilateral leg swelling.  Patient reports several weeks of increased leg swelling as well as pain and drainage.  Also notes mild erythema on both legs with similar distribution. Admitted IP status w/ leg swelling . Plan for IV diuresis , monitor weights and I&O , cardiology consult , maintain fld restriction , low Na diet , echo . CKD CR 1.35 , baseline 1.0 , monitor labs . CVA asa, plavix , lipitor , monitor . Fe def anemia hgb 11.2 cont Fe therapy . HTN BP controlled , hold amlodipine , cont " toprol . DM SSI and monitor .     Anticipated Length of Stay/Certification Statement: Patient will be admitted on an inpatient basis with an anticipated length of stay of greater than 2 midnights secondary to fluid overload.     Date:   5/23 Day 2: Her lower extremities are decreased from when she initially came in but still swollen. + BLE edema . -1430 ml output last 24 hrs . Cont IV lasix BID . Monitor I&O , weights , compression stockings . Fld restriction and low Na diet . CR improved to 1.27 from 1.35 , recheck in am .     Date: 5/24  Day 3: Has surpassed a 2nd midnight with active treatments and services. Cont IV lasix , will likely stop tomorrow. Noted dec in weight . CR inc to 10.39 from 1.27 monitor . C/o R lower leg pain . Began yest and is worse back of calf . Good urine output .       ED Triage Vitals   Temperature Pulse Respirations Blood Pressure SpO2   05/22/24 1456 05/22/24 1456 05/22/24 1456 05/22/24 1456 05/22/24 1456   98.6 °F (37 °C) 70 18 113/57 99 %      Temp Source Heart Rate Source Patient Position - Orthostatic VS BP Location FiO2 (%)   05/22/24 1456 05/22/24 1456 05/22/24 1456 05/22/24 1456 --   Oral Monitor Sitting Right arm       Pain Score       05/22/24 1615       7          Wt Readings from Last 1 Encounters:   05/24/24 77.4 kg (170 lb 10.2 oz)     Additional Vital Signs:   05/24/24 0736 97.8 °F (36.6 °C) 70 18 113/55 79 94 % -- Lying   05/23/24 2342 97.8 °F (36.6 °C) 76 18 121/54 84 96 % None (Room air) Lying   05/23/24 1929 97.8 °F (36.6 °C) 72 18 138/72 87 97 % None (Room air) Sitting   05/23/24 1920 -- -- -- -- -- -- None (Room air) --   05/23/24 1500 97.7 °F (36.5 °C) 70 18 131/59 85 99 % -- Sitting   05/23/24 1345 -- -- -- -- -- -- None (Room air) --   05/23/24 1251 -- 70 -- 125/58 --        05/23/24 0929 -- -- -- 125/58 83 -- -- Sitting   05/23/24 0732 97.5 °F (36.4 °C) 69 18 101/50 72 92 % None (Room air) Sitting   05/23/24 0247 98 °F (36.7 °C) 94 18 129/79 98 94 % None (Room  air) Lying   05/23/24 0245 97.6 °F (36.4 °C) 69 16 150/62 89 95 % None (Room air) Lying   05/22/24 2302 97 °F (36.1 °C) Abnormal  70 18 132/62 89 96 % None (Room air) Lying   05/22/24 2200 -- 75 -- 166/74 -- -- -- Lying   05/22/24 1950 -- -- -- -- -- -- None (Room air) --   05/22/24 1857 98 °F (36.7 °C) 69 18 120/78 -- 94 % None (Room air) Lying   05/22/24 1845 -- -- -- -- -- -- None (Room air) --   05/22/24 1800 -- 66 17 165/70 100 98 % None (Room air) Lying   05/22/24 1730 -- 66 14 159/67 97 95 % None (Room air) Lying   05/22/24 1615 -- 74 18 139/61 88 97 % None (Room air) Lying     Pertinent Labs/Diagnostic Test Results:   5/23 Echo  Left Ventricle: Left ventricular cavity size is normal. Wall thickness is mildly increased. The left ventricular ejection fraction is 70%. Systolic function is vigorous. Wall motion is normal. Diastolic function is mildly abnormal, consistent with grade I (abnormal) relaxation.  There is mild dynamic LVOT obstruction (peak pressure gradient 25 mmHg with valsalva).    Mitral Valve: There is moderate annular calcification.    Tricuspid Valve: There is mild regurgitation.     5/22 EKG Sinus rhythm with marked sinus arrhythmia  Non-specific intra-ventricular conduction block  Abnormal ECG  XR chest 1 view portable   Final Result by uSsannah Jimenez MD (05/23 0944)      No acute cardiopulmonary disease.            Workstation performed: [id number]               Results from last 7 days   Lab Units 05/23/24  0531 05/22/24  1544   WBC Thousand/uL 4.98 7.11   HEMOGLOBIN g/dL 10.6* 11.2*   HEMATOCRIT % 33.3* 35.8   PLATELETS Thousands/uL 258 290   TOTAL NEUT ABS Thousands/µL 2.12 3.82         Results from last 7 days   Lab Units 05/24/24  0529 05/23/24  0531 05/22/24  1544   SODIUM mmol/L 139 141 139   POTASSIUM mmol/L 4.2 4.2 4.5   CHLORIDE mmol/L 99 102 104   CO2 mmol/L 33* 32 30   ANION GAP mmol/L 7 7 5   BUN mg/dL 31* 26* 22   CREATININE mg/dL 1.39* 1.27 1.35*   EGFR ml/min/1.73sq m 35 39  36   CALCIUM mg/dL 8.9 9.0 9.1   MAGNESIUM mg/dL  --  2.1  --    PHOSPHORUS mg/dL  --  4.7*  --      Results from last 7 days   Lab Units 05/23/24  0531 05/22/24  1544   AST U/L 14 16   ALT U/L 7 8   ALK PHOS U/L 103 126*   TOTAL PROTEIN g/dL 6.0* 6.9   ALBUMIN g/dL 3.4* 3.9   TOTAL BILIRUBIN mg/dL 0.27 0.28     Results from last 7 days   Lab Units 05/24/24  0736 05/23/24  2157 05/23/24  1619 05/23/24  1112 05/23/24  0731 05/22/24  2110   POC GLUCOSE mg/dl 136 122 150* 207* 130 137     Results from last 7 days   Lab Units 05/24/24  0529 05/23/24  0531 05/22/24  1544   GLUCOSE RANDOM mg/dL 133 129 80     Results from last 7 days   Lab Units 05/22/24  1800 05/22/24  1544   HS TNI 0HR ng/L  --  17   HS TNI 2HR ng/L 19  --    HSTNI D2 ng/L 2  --      Results from last 7 days   Lab Units 05/22/24  1544   PROTIME seconds 12.8   INR  0.94   PTT seconds 28     Results from last 7 days   Lab Units 05/22/24  1544   TSH 3RD GENERATON uIU/mL 3.406       Results from last 7 days   Lab Units 05/22/24  1544   BNP pg/mL 302*         ED Treatment:   Medication Administration from 05/22/2024 1451 to 05/22/2024 1834         Date/Time Order Dose Route Action Action by Comments     05/22/2024 1616 EDT furosemide (LASIX) injection 40 mg 40 mg Intravenous Given Yoli Chong, RN --          Past Medical History:   Diagnosis Date    Acid reflux     Acute kidney injury (HCC)     Anemia     hx of iron-deficient    Anxiety     Arthritis     Asthma     last needed inhaler last year 2020    Basal cell carcinoma     upper lip    Chronic narcotic dependence (HCC)     Chronic pain     Colon polyp     Cystocele     Diabetes mellitus (HCC)     stable    Disease of thyroid gland     hypothyroidism    Diverticulosis     Dizziness     at times    Dysfunctional uterine bleeding     last assessed - 07May2014    Dysphagia     Fibromyalgia     Gastric ulcer     Gastroparesis     History of colonic polyps     last assessed - 09Feb2016    History of  gastroesophageal reflux (GERD)     Hypercholesterolemia     Hyperlipidemia     Hypertension     Hyponatremia     IBS (irritable bowel syndrome)     Pneumobilia 06/18/2022    Post laminectomy syndrome     S/P insertion of spinal cord stimulator 07/18/2018    s/p Medtronic loop recorder 3/2/2024 03/02/2024    Seasonal allergies     Shortness of breath     exertional    Spinal stenosis     Status post lumbar spinal fusion 03/16/2018    Stroke ([facility])     pt states slight stroke March 2022     Present on Admission:   Cerebrovascular accident (CVA), unspecified mechanism ([facility])   Stage 3a chronic kidney disease ([facility])   Chronic obstructive pulmonary disease, unspecified COPD type ([facility])   Iron deficiency anemia secondary to inadequate dietary iron intake   Hypertension   Type 2 diabetes mellitus with other skin complications ([facility])   Leg swelling      Admitting Diagnosis: Leg swelling [M79.89]  Bilateral lower extremity edema [R60.0]  Acute congestive heart failure, unspecified heart failure type ([facility]) [I50.9]  Age/Sex: 79 y.o. female  Admission Orders:  Scheduled Medications:  aspirin, 81 mg, Oral, Daily  atorvastatin, 40 mg, Oral, Daily  clopidogrel, 75 mg, Oral, Daily  ferrous sulfate, 325 mg, Oral, Daily With Breakfast  furosemide, 40 mg, Intravenous, BID (diuretic)  heparin (porcine), 5,000 Units, Subcutaneous, Q8H ARY  insulin lispro, 1-5 Units, Subcutaneous, TID AC  insulin lispro, 1-5 Units, Subcutaneous, HS  latanoprost, 1 drop, Both Eyes, HS  levothyroxine, 25 mcg, Oral, Early Morning  magnesium Oxide, 400 mg, Oral, BID  metoprolol succinate, 50 mg, Oral, Q12H ARY  Milnacipran HCl, 1 tablet, Oral, Daily  oxybutynin, 10 mg, Oral, Daily  pantoprazole, 40 mg, Oral, Daily Before Breakfast  senna-docusate sodium, 2 tablet, Oral, BID      Continuous IV Infusions:     PRN Meds:  acetaminophen, 650 mg, Oral, Q6H PRN  baclofen, 10 mg, Oral, BID PRN  HYDROmorphone, 0.2 mg, Intravenous, Q6H PRN  5/23 x1  meclizine, 25 mg, Oral,  4x Daily PRN  ondansetron, 4 mg, Intravenous, Q6H PRN  oxyCODONE-acetaminophen, 2 tablet, Oral, Q6H PRN    Fingerstick ac and hs   PT OT eval   Weights   I&O   Cons carb diet   Up and OOB   Tele       Network Utilization Review Department  ATTENTION: Please call with any questions or concerns to [phone] and carefully listen to the prompts so that you are directed to the right person. All voicemails are confidential.   For Discharge needs, contact Care Management DC Support Team at [phone] opt. [phone]  Send all requests for admission clinical reviews, approved or denied determinations and any other requests to dedicated fax number below belonging to the campus where the patient is receiving treatment. List of dedicated fax numbers for the Facilities:  FACILITY NAME UR FAX NUMBER   ADMISSION DENIALS (Administrative/Medical Necessity) [phone]   DISCHARGE SUPPORT TEAM (NETWORK) [phone]   PARENT CHILD HEALTH (Maternity/NICU/Pediatrics) [phone]   [facility] [phone]   [facility] [phone]   [facility] [phone]   [facility] [phone]   [facility] [phone]   [facility] [phone]   [facility] [phone]   [facility] [phone]   [facility] [phone]   [facility] [phone]   [facility] [phone]   [facility] [phone]

## 2024-05-23 NOTE — ASSESSMENT & PLAN NOTE
Lab Results   Component Value Date    EGFR 39 05/23/2024    EGFR 36 05/22/2024    EGFR 47 05/01/2024    CREATININE 1.27 05/23/2024    CREATININE 1.35 (H) 05/22/2024    CREATININE 1.10 05/01/2024   Patient with history of CKD 3; baseline creatinine is approximately 1.0  Currently near baseline; monitor on morning labs  May be slightly elevated in the setting of fluid overload; initiated diuresis and monitor for improvement

## 2024-05-23 NOTE — ASSESSMENT & PLAN NOTE
Blood pressure well-controlled on admission  Home regimen amlodipine 5 mg daily, metoprolol succinate 50 mg twice daily   Held amlodipine in the setting of leg swelling  Continue metoprolol

## 2024-05-23 NOTE — CASE MANAGEMENT
Case Management Assessment & Discharge Planning Note    Patient name Mattie Varela  Location [facility] /[facility]4 -* MRN [id number]  : 1945 Date 2024       Current Admission Date: 2024  Current Admission Diagnosis:Leg swelling   Patient Active Problem List    Diagnosis Date Noted Date Diagnosed    Leg swelling 2024     s/p Medtronic loop recorder 3/2/2024 2024     Moderate protein-calorie malnutrition (HCC) 2024     Hypertensive urgency 2024     AMS (altered mental status) 2024     Encounter for examination for admission to nursing home 2024     Stage 3a chronic kidney disease (HCC) 01/10/2024     Left ventricular outflow tract obstruction 2024     Obesity, Class I, BMI 30-34.9 2024     Urinary retention 2023     SADAF (acute kidney injury) ([facility]) 2023     Tremor 2023     Exertional shortness of breath 2023     Non obstructive CAD 11/15/2023     History of lumbar surgery 11/10/2023     Abnormal urinalysis 2023     Chronic obstructive pulmonary disease, unspecified COPD type ([facility]) 2023     Confusion with body shakes and blank stare 2023     Normocytic anemia 2023     Bilateral lower extremity edema 2023     Cerebrovascular accident (CVA), unspecified mechanism ([facility]) 2022     Stroke-like symptoms 2022     Prepyloric ulcer 2021     Diarrhea 2021     Mild intermittent asthma without complication 2020     S/P insertion of spinal cord stimulator 2018     Iron deficiency anemia secondary to inadequate dietary iron intake 2018     Type 2 diabetes mellitus with other skin complications ([facility])      Failed back surgical syndrome 2018     Hypothyroidism 2017     Degenerative lumbar spinal stenosis 2017     Glaucoma 2017     Overactive bladder 2016     Dyspepsia 2016     Hypercholesterolemia 2016     Anxiety 2015      Chronic pain disorder 12/18/2013     Hypertension 06/12/2013       LOS (days): 1  Geometric Mean LOS (GMLOS) (days): 2.6  Days to GMLOS:1.8     OBJECTIVE:    Risk of Unplanned Readmission Score: 27.52         Current admission status: Inpatient       Preferred Pharmacy:   [id number] - [place] - [address]  [address] [facility] [place] [address]  Phone: [phone] Fax: [phone]    [id number] - [place] - [id number] [facility] [place] [address]  Phone: [phone] Fax: [phone]    [id number] - [place] - [address] [address]  Phone: [phone] Fax: [phone]    [facility][id number] - [place] - [address] [facility] [address]  Phone: [phone] Fax: [phone]    [facility] [place] [address]  [facility]  [place] [address]  Phone: [phone] Fax: [phone]    [facility] [place] - [address]  [facility],  [address]  [facility],  First [address] [facility] [address]  Phone: [phone] Fax: [phone]    Primary Care Provider: NANY Pollock    Primary Insurance: [employer] [facility]  Secondary Insurance:     ASSESSMENT:  Active Health Care Proxies       Venice Baires [facility] Care Representative - Daughter   Primary Phone: [phone] (Mobile)  Home Phone: [phone]                 Advance Directives  Does patient have a Health Care POA?: Yes  Does patient have Advance Directives?: Yes  Advance Directives: Power of  for health care, Power of  for finance, Living will  Primary Contact: Venice [phone]         Readmission Root Cause  30 Day Readmission: No    Patient Information  Admitted from:: Facility ([facility])  Mental Status: Alert  During Assessment patient was accompanied by: Not accompanied during  assessment  Assessment information provided by:: Patient  Primary Caregiver: Self  Support Systems: Self, Mosque/cassandra community, Daughter  County of Residence: [place]  What city do you live in?: [place]  Home entry access options. Select all that apply.: Elevator  Type of Current Residence: Apartment  Floor Level: 2  Upon entering residence, is there a bedroom on the main floor (no further steps)?: Yes  Living Arrangements: Other (Comment) (Patient has a roomate at the [facility])  Is patient a ?: No    Activities of Daily Living Prior to Admission  Functional Status: Assistance  Completes ADLs independently?: No  Level of ADL dependence: Assistance ([facility] provides assistance with meals, housekeeping, bathing, and medication managment.)  Ambulates independently?: Yes  Does patient use assisted devices?: Yes  Assisted Devices (DME) used: Walker, Rollator, Shower Chair  Does patient currently own DME?: Yes  What DME does the patient currently own?: Rollator, Shower Chair, Walker  Does patient have a history of Outpatient Therapy (PT/OT)?: Yes  Does the patient have a history of Short-Term Rehab?: Yes ([facility])  Does patient have a history of HHC?: Yes  Does patient currently have HHC?: No         Patient Information Continued  Income Source: Pension/intermediate  Does patient have prescription coverage?: Yes  Does patient receive dialysis treatments?: No  Does patient have a history of substance abuse?: No  Does patient have a history of Mental Health Diagnosis?: Yes (Anxiety)  Is patient receiving treatment for mental health?: Yes  Has patient received inpatient treatment related to mental health in the last 2 years?: No         Means of Transportation  Means of Transport to [facility]:: Other (Comment) ([employer])      Social Determinants of Health (SDOH)      Flowsheet Row Most Recent Value   Housing Stability    In the last 12 months, was there a time when you were not able to pay the mortgage  or rent on time? N   In the past 12 months, how many times have you moved where you were living? 0   At any time in the past 12 months, were you homeless or living in a shelter (including now)? N   Transportation Needs    In the past 12 months, has lack of transportation kept you from medical appointments or from getting medications? no   In the past 12 months, has lack of transportation kept you from meetings, work, or from getting things needed for daily living? No   Food Insecurity    Within the past 12 months, you worried that your food would run out before you got the money to buy more. Never true   Within the past 12 months, the food you bought just didn't last and you didn't have money to get more. Never true   Utilities    In the past 12 months has the electric, gas, oil, or water company threatened to shut off services in your home? No            DISCHARGE DETAILS:    Discharge planning discussed with:: Patient  Freedom of Choice: Yes     CM contacted family/caregiver?: Yes  Were Treatment Team discharge recommendations reviewed with patient/caregiver?: Yes  Did patient/caregiver verbalize understanding of patient care needs?: N/A- going to facility (Return to [facility])  Were patient/caregiver advised of the risks associated with not following Treatment Team discharge recommendations?: Yes    Contacts  Patient Contacts: daughter, Dr. SchaferVenice Viry  Relationship to Patient:: Family  Contact Method: Phone  Phone Number: [phone]  Reason/Outcome: Continuity of Care, Emergency Contact, Referral, Discharge Planning    Requested Home Health Care         Is the patient interested in HHC at discharge?: No    DME Referral Provided  Referral made for DME?: No    Other Referral/Resources/Interventions Provided:  Interventions: Assisted Living  Referral Comments: Return to [facility]    Would you like to participate in our [facility] service program?  : No - Declined    Treatment  Team Recommendation: Assisted Living  Discharge Destination Plan:: Assisted Living       CM met with patient at bedside to introduce self and role with discharge planning.  Patient lives at [facility].  Patient reports receiving assistance through [employer] with medication management,  housekeeping, bathing, and transportation.   Patient reports utilizing a RW, rollator, and shower chair at home.    CM placed PC to patient's daughter to introduce self and role with discharge planning.  Patient's daughter reports that [employer] provides OP PT/OT and assists with transportation.  Patient will need transportation arranged at time of discharge, [employer] should be able to transport.  CM will coordinate with [employer], if they do not provide transportation they will cover the cost of WC Van, family would just need to provide them with the bill.     CM made PC to [employer] [phone] x [phone], CM spoke with Faye who confirmed patient receives assistance with transportation, personal care, and PT/OT.  [employer] will not provide transportation at time of discharge but they will cover the cost.  If family were to receive a bill, they would just provide this to [employer] and they will pay the bill.      CM placed PC to [facility], no answer.  CM left voicemail requesting a call back to discuss discharge planning and to facilitate return to facility once medically stable.  CM will continue to follow for discharge coordination.

## 2024-05-23 NOTE — UTILIZATION REVIEW
NOTIFICATION OF INPATIENT ADMISSION   AUTHORIZATION REQUEST   SERVICING FACILITY:   [place] [address]  Tax ID: [id number]  NPI: [id number] ATTENDING PROVIDER:  Attending Name and NPI#: Alexys Angel Do [[id number]]  Address: [address] [place] [address]  Phone: [phone]     ADMISSION INFORMATION:  Place of Service: Inpatient [facility]  Place of Service Code: 21  Inpatient Admission Date/Time: 5/22/24  6:05 PM  Discharge Date/Time: No discharge date for patient encounter.  Admitting Diagnosis Code/Description:  Leg swelling [M79.89]  Bilateral lower extremity edema [R60.0]  Acute congestive heart failure, unspecified heart failure type (HCC) [I50.9]     UTILIZATION REVIEW CONTACT:  Lorraine Carter, Utilization   Network Utilization Review Department  Phone: [phone]  Fax [phone]  Email: [email or web address]  Contact for approvals/pending authorizations, clinical reviews, and discharge.     PHYSICIAN ADVISORY SERVICES:  Medical Necessity Denial & Hdpm-tc-Xzyq Review  Phone: [phone]  Fax: [phone]  Email: [email or web address]     DISCHARGE SUPPORT TEAM:  For Patients Discharge Needs & Updates  Phone: [phone] opt. [phone] Fax: [phone]  Email: [email or web address]

## 2024-05-23 NOTE — OCCUPATIONAL THERAPY NOTE
Occupational Therapy Evaluation     Patient Name: Mattie Varela  Today's Date: 5/23/2024  Problem List  Principal Problem:    Leg swelling  Active Problems:    Type 2 diabetes mellitus with other skin complications (HCC)    Hypertension    Iron deficiency anemia secondary to inadequate dietary iron intake    Cerebrovascular accident (CVA), unspecified mechanism (HCC)    Chronic obstructive pulmonary disease, unspecified COPD type (HCC)    Stage 3a chronic kidney disease (HCC)    Past Medical History  Past Medical History:   Diagnosis Date    Acid reflux     Acute kidney injury (HCC)     Anemia     hx of iron-deficient    Anxiety     Arthritis     Asthma     last needed inhaler last year 2020    Basal cell carcinoma     upper lip    Chronic narcotic dependence (HCC)     Chronic pain     Colon polyp     Cystocele     Diabetes mellitus (HCC)     stable    Disease of thyroid gland     hypothyroidism    Diverticulosis     Dizziness     at times    Dysfunctional uterine bleeding     last assessed - [id number]    Dysphagia     Fibromyalgia     Gastric ulcer     Gastroparesis     History of colonic polyps     last assessed - [id number]    History of gastroesophageal reflux (GERD)     Hypercholesterolemia     Hyperlipidemia     Hypertension     Hyponatremia     IBS (irritable bowel syndrome)     Pneumobilia 06/18/2022    Post laminectomy syndrome     S/P insertion of spinal cord stimulator 07/18/2018    s/p [employer] loop recorder 3/2/2024 03/02/2024    Seasonal allergies     Shortness of breath     exertional    Spinal stenosis     Status post lumbar spinal fusion 03/16/2018    Stroke ([facility])     pt states slight stroke March 2022     Past Surgical History  Past Surgical History:   Procedure Laterality Date    APPENDECTOMY      BACK SURGERY      BREAST CYST EXCISION Left     CARDIAC CATHETERIZATION  11/14/2023    Procedure: Cardiac catheterization;  Surgeon: Randolph Holt MD;  [facility];  Service:  Cardiology    CARDIAC CATHETERIZATION N/A 11/14/2023    Procedure: Cardiac Coronary Angiogram;  Surgeon: Randolph Holt MD;  [facility];  Service: Cardiology    CARDIAC ELECTROPHYSIOLOGY PROCEDURE N/A 3/2/2024    Procedure: Cardiac loop recorder implant;  Surgeon: Edu Fontaine MD;  [facility];  Service: Cardiology    CHOLECYSTECTOMY      COLONOSCOPY      ESOPHAGOGASTRODUODENOSCOPY N/A 09/28/2016    Procedure: ESOPHAGOGASTRODUODENOSCOPY (EGD);  Surgeon: Mylene Moeller MD;  Location: [facility];  Service:     HERNIA REPAIR      HYSTERECTOMY      TTAH-BSO age 28    LAMINECTOMY      LUMBAR LAMINECTOMY      OOPHORECTOMY      age 28    MA ARTHRODESIS POSTERIOR/PSTLAT TQ 1NTRSPC THORACIC N/A 06/04/2018    Procedure: Reopening of lumbar incision for T12-L5 posterior instrumented fixation and fusion and T12-L4 posterior decompression;  Surgeon: Wood Rogel MD;  Location: [facility];  Service: Neurosurgery    MA COLONOSCOPY FLX DX W/COLLJ SPEC WHEN PFRMD N/A 03/02/2016    Procedure: EGD AND COLONOSCOPY;  Surgeon: Mylene Moeller MD;  [facility]: [facility];  Service: Gastroenterology    MA DILATION ESOPH UNGUIDED SOUND/BOUGIE 1/MULT PASS N/A 09/28/2016    Procedure: DILATATION ESOPHAGEAL;  Surgeon: Mylene Moeller MD;  [facility]: [facility];  Service: Gastroenterology    MA ESOPHAGOGASTRODUODENOSCOPY TRANSORAL DIAGNOSTIC N/A 07/18/2016    Procedure: ESOPHAGOGASTRODUODENOSCOPY (EGD);  Surgeon: Mylene Moeller MD;  [facility];  Service: Gastroenterology    MA INSJ/RPLCMT SPINAL NPG/RCVR POCKET CRTJ&CONNJ Left 06/04/2018    Procedure: removal of left buttock implantable pulse generator and placement of new  implantable pulse generator;  Surgeon: Wood Rogel MD;  [facility];  Service: Neurosurgery         05/23/24 0830   OT Last Visit   OT Visit Date 05/23/24   Note Type   Note type Evaluation   Pain Assessment   Pain Assessment Tool 0-10   Pain Score 5   Pain  "Location/Orientation Orientation: Bilateral;Location: Leg   Hospital Pain Intervention(s) Repositioned;Ambulation/increased activity   Restrictions/Precautions   Weight Bearing Precautions Per Order No   Other Precautions Bed Alarm;Telemetry;Fall Risk;Pain   Home Living   Type of Home Assisted living   Bathroom Shower/Tub Walk-in shower   Bathroom Equipment Shower chair   Home Equipment   (rollator)   Additional Comments [facility]   Prior Function   Level of [place] Independent with ADLs;Independent with functional mobility;Needs assistance with IADLS   Lives With Facility staff   Receives Help From   (staff. gets PT x2/wk and OT x1/wk)   IADLs Family/Friend/Other provides transportation;Family/Friend/Other provides meals;Family/Friend/Other provides medication management   Comments Pt independent with BADLs such as dressing and toileting; some assistance with bathing from staff   General   Additional Pertinent History Mattie Varela is a 79 y.o. female with a PMH of CVA, IBS, hyperlipidemia, GERD, gastric ulcer, type 2 diabetes, gastroparesis, and anemia who presents with bilateral leg swelling.  Patient reports several weeks of increased leg swelling as well as pain and drainage.  Also notes mild erythema on both legs with similar distribution.  Has had similar admission in the past and was noted to have 21 pound weight gain as compared to weights taken 3 months previous to this admission.  Patient denies shortness of breath, orthopnea, cough, fever, chills; saturating well on room air with no signs of discomfort on exam.  Complaint is pain in legs.  She resides at [facility] and walks with a walker at baseline.   Subjective   Subjective \"My legs are swollen.\"   ADL   Eating Assistance 7  Independent   Grooming Assistance 5  Supervision/Setup   UB Bathing Assistance 5  Supervision/Setup   LB Bathing Assistance 4  Minimal Assistance   UB Dressing Assistance 5  Supervision/Setup   LB Dressing Assistance 4  Minimal " Assistance   Toileting Assistance  4  Minimal Assistance   Additional Comments Min A for toileting and LBD 2* dec balance and knee buckle   Bed Mobility   Additional Comments pt OOB to side chair   Transfers   Sit to Stand 5  Supervision   Additional items Armrests;Increased time required   Stand to Sit 5  Supervision   Additional items Increased time required   Balance   Static Sitting Fair +   Dynamic Sitting Fair   Static Standing Fair   Dynamic Standing Fair -   Activity Tolerance   Activity Tolerance Patient tolerated treatment well   Nurse Made Aware Krystina CHONG   RUE Assessment   RUE Assessment WFL   LUE Assessment   LUE Assessment WFL   Hand Function   Gross Motor Coordination Functional   Fine Motor Coordination Functional   Psychosocial   Psychosocial (WDL) WDL   Cognition   Overall Cognitive Status WFL   Arousal/Participation Alert;Responsive   Attention Within functional limits   Orientation Level Oriented to person;Oriented to place;Oriented to situation  (forgetful of date)   Memory Within functional limits   Following Commands Follows one step commands without difficulty   Assessment   Limitation Decreased UE strength;Decreased Safe judgement during ADL;Decreased endurance;Decreased ADL status   Prognosis Good   Assessment Patient is a 79 y.o. year old female seen for OT eval s/p admit to [facility] on 5/22/2024 with BLE swelling, blisters and weeping.  Patient with active OT orders and activity orders for Activity as tolerated, Up and OOB as tolerated . OT consulted to assess ADLs/IADLs/functional mobility and assist w/ D/C planning. PTA, was (I) with ADLs, required (A) with IADLs, and completed functional mobility/transfers with Leah utilizing rollator . Patient lives in an [facility]; has assistance with IADLs, and certain BADLs such as bathing from facility staff. [facility] provided meals, laundry and cleaning services. Patient demonstrates the following deficits impacting occupational performance: weakness, decreased  strength , decreased balance, decreased activity tolerance, decreased safety awareness, and increased pain. These impairments, as well at pt’s difficulty performing ADLs, difficulty performing transfers/mobility, and fall risk , limit pt’s ability to safely engage in all baseline areas of occupation. Pt's CLOF as follows: eating/grooming: Independent, UB ADLs: supervision , LB ADLs: [place], toileting: [place], bed mobility: DNT, functional transfers: SBA-CGA, functional mobility: SBA-CGA, sitting/standing tolerance: F+/F-. Pt noted with dec/loss of bal during standing tasks, which made her require Min A for safety. Pt would benefit from continued skilled OT while in acute setting to address deficits as defined above and to maximize (I) w/ ADLs/functional mobility. Occupational performance areas to address include: bathing/shower, toilet hygiene, dressing, functional mobility, and clothing management. Based on the aforementioned evaluation, functional performance deficits, and assessments, pt has been identified as a moderate complexity evaluation. At this time, recommendation for pt to receive post-acute rehabilitation services at a Level III (minimum resource intensity) for possible lymphedema therapy/ home OT/PT services; due to above deficits and CLOF. OT will continue to follow pt 3-5x/wk to address the goals listed below to  w/in 10-14 days.   Goals   Patient Goals to go home   LTG Time Frame 10-14   Long Term Goal 1- Pt will perf LBD tasks with S/setup; 2- pt will inc stanidng bal/lila to F+ for 5 min during setup of sinkside grooming; 3- Pt will perf toileting tasks and toile transfers with S/Mod I; 4- pt will complete bed mob supine to sit and demo G EOB sitting bal for 5 min in prep UBD.   Plan   Treatment Interventions ADL retraining;Visual perceptual retraining;Functional transfer training;UE strengthening/ROM;Endurance training;Energy conservation;Activityengagement   Goal Expiration Date 24   OT  Treatment Day 0   OT Frequency 3-5x/wk   Discharge Recommendation   Rehab Resource Intensity Level, OT III (Minimum Resource Intensity)  (continue with home OT/PT)   AM-PAC Daily Activity Inpatient   Lower Body Dressing 3   Bathing 3   Toileting 3   Upper Body Dressing 4   Grooming 4   Eating 4   Daily Activity Raw Score 21   Daily Activity Standardized Score (Calc for Raw Score >=11) 44.27   AM-PAC Applied Cognition Inpatient   Following a Speech/Presentation 4   Understanding Ordinary Conversation 4   Taking Medications 3   Remembering Where Things Are Placed or Put Away 3   Remembering List of 4-5 Errands 3   Taking Care of Complicated Tasks 3   Applied Cognition Raw Score 20   Applied Cognition Standardized Score 41.76     Edwina Moore MS OTR/L CLT

## 2024-05-23 NOTE — ASSESSMENT & PLAN NOTE
Patient presents with complaints of leg swelling; noted to have 21 pound weight gain in the last 3 months  Has had similar problems in the past; denies shortness of breath or chest pain  Echo 2023: EF 70%, 1 abnormal relaxation  Home regimen Lasix 20 mg twice daily  Was to have echocardiogram done as an outpatient; will order while inpatient  Echocardiogram pending  BNP noted to be 302; this appears to be around her baseline  Maintain fluid restricted and low-sodium diet  Compression stocking  Continue IV lasix 40 mg BID  Monitor I and O and daily standing weights

## 2024-05-23 NOTE — H&P
[facility]  H&P  Name: Mattie Varela 79 y.o. female I MRN: [id number]  Unit/Bed#: E4 -01 I Date of Admission: 5/22/2024   Date of Service: 5/22/2024  Hospital Day: 0      Assessment & Plan   * Leg swelling  Assessment & Plan  Patient presents with complaints of leg swelling; noted to have 21 pound weight gain in the last 3 months  Has had similar problems in the past; denies shortness of breath or chest pain  Will initiate IV diuresis, monitor daily weights and ins and outs  Was to have echocardiogram done recently; will order while inpatient  Consider consult to cardiology if patient exhibits acute CHF symptoms  BNP noted to be 302; this appears to be her baseline  Maintain fluid restricted and low-sodium diet  Compression stocking  Well score for DVT-0; low risk    Stage 3a chronic kidney disease (HCC)  Assessment & Plan  Lab Results   Component Value Date    EGFR 36 05/22/2024    EGFR 47 05/01/2024    EGFR 63 03/26/2024    CREATININE 1.35 (H) 05/22/2024    CREATININE 1.10 05/01/2024    CREATININE 0.86 03/26/2024     Patient with history of CKD 3; baseline creatinine is approximately 1.0  Currently near baseline; monitor on morning labs  May be slightly elevated in the setting of fluid overload; initiate diuresis and monitor for improvement    Chronic obstructive pulmonary disease, unspecified COPD type (HCC)  Assessment & Plan  History of COPD; currently 94% SpO2 on room air  Denies symptoms of shortness of breath/dyspnea on exertion; not in acute exacerbation  Continue home therapies    Cerebrovascular accident (CVA), unspecified mechanism (HCC)  Assessment & Plan  Patient with history of CVA; continue aspirin, Plavix, Lipitor  Denies stroke-like symptoms, monitor    Iron deficiency anemia secondary to inadequate dietary iron intake  Assessment & Plan  Hemoglobin stable 11.2; continue iron therapy    Hypertension  Assessment & Plan  Blood pressure well-controlled on  "admission  Will hold amlodipine in the setting of leg swelling; continue Toprol 50 mg twice daily    Type 2 diabetes mellitus with other skin complications (HCC)  Assessment & Plan  Lab Results   Component Value Date    HGBA1C 6.5 (H) 11/26/2023       No results for input(s): \"POCGLU\" in the last 72 hours.    Blood Sugar Average: Last 72 hrs:    SSI; Subcutaneous Insulin Order Set  Blood Glucose checks TIDWM and QHS (Q6H for NPO patients)  Hold oral medications  Blood Glucose goal while inpatient is 140-180  Reduce basal insulin by 25-50% while inpatient  Consistent Carbohydrate Diet           VTE Pharmacologic Prophylaxis: VTE Score: 4 Moderate Risk (Score 3-4) - Pharmacological DVT Prophylaxis Ordered: heparin.  Code Status: Level 1 - Full Code   Discussion with family: Updated  (daughter) via phone.    Anticipated Length of Stay: Patient will be admitted on an inpatient basis with an anticipated length of stay of greater than 2 midnights secondary to fluid overload.    Total Time Spent on Date of Encounter in care of patient: 45 mins. This time was spent on one or more of the following: performing physical exam; counseling and coordination of care; obtaining or reviewing history; documenting in the medical record; reviewing/ordering tests, medications or procedures; communicating with other healthcare professionals and discussing with patient's family/caregivers.    Chief Complaint: Bilateral leg swelling    History of Present Illness:  Mattie Varela is a 79 y.o. female with a PMH of CVA, IBS, hyperlipidemia, GERD, gastric ulcer, type 2 diabetes, gastroparesis, and anemia who presents with bilateral leg swelling.  Patient reports several weeks of increased leg swelling as well as pain and drainage.  Also notes mild erythema on both legs with similar distribution.  Has had similar admission in the past and was noted to have 21 pound weight gain as compared to weights taken 3 months previous to " this admission.  Patient denies shortness of breath, orthopnea, cough, fever, chills; saturating well on room air with no signs of discomfort on exam.  Complaint is pain in legs.  [facility] and walks with a walker at baseline.    Review of Systems:  Review of Systems   Constitutional:  Positive for activity change. Negative for chills and fever.   HENT:  Negative for ear pain and sore throat.    Eyes:  Negative for pain and visual disturbance.   Respiratory:  Negative for cough and shortness of breath.    Cardiovascular:  Positive for leg swelling. Negative for chest pain and palpitations.   Gastrointestinal:  Negative for abdominal pain and vomiting.   Genitourinary:  Positive for enuresis. Negative for dysuria and hematuria.   Musculoskeletal:  Positive for gait problem. Negative for arthralgias and back pain.   Skin:  Positive for rash and wound. Negative for color change.   Neurological:  Negative for seizures and syncope.   All other systems reviewed and are negative.      Past Medical and Surgical History:   Past Medical History:   Diagnosis Date    Acid reflux     Acute kidney injury (HCC)     Anemia     hx of iron-deficient    Anxiety     Arthritis     Asthma     last needed inhaler last year 2020    Basal cell carcinoma     upper lip    Chronic narcotic dependence (HCC)     Chronic pain     Colon polyp     Cystocele     Diabetes mellitus (HCC)     stable    Disease of thyroid gland     hypothyroidism    Diverticulosis     Dizziness     at times    Dysfunctional uterine bleeding     last assessed - [id number]    Dysphagia     Fibromyalgia     Gastric ulcer     Gastroparesis     History of colonic polyps     last assessed - [id number]    History of gastroesophageal reflux (GERD)     Hypercholesterolemia     Hyperlipidemia     Hypertension     Hyponatremia     IBS (irritable bowel syndrome)     Pneumobilia 06/18/2022    Post laminectomy syndrome     S/P insertion of spinal cord stimulator 07/18/2018     s/p [employer] loop recorder 3/2/2024 03/02/2024    Seasonal allergies     Shortness of breath     exertional    Spinal stenosis     Status post lumbar spinal fusion 03/16/2018    Stroke (HCC)     pt states slight stroke March 2022       Past Surgical History:   Procedure Laterality Date    APPENDECTOMY      BACK SURGERY      BREAST CYST EXCISION Left     CARDIAC CATHETERIZATION  11/14/2023    Procedure: Cardiac catheterization;  Surgeon: Randolph Holt MD;  [facility];  Service: Cardiology    CARDIAC CATHETERIZATION N/A 11/14/2023    Procedure: Cardiac Coronary Angiogram;  Surgeon: Randolph Holt MD;  [facility];  Service: Cardiology    CARDIAC ELECTROPHYSIOLOGY PROCEDURE N/A 3/2/2024    Procedure: Cardiac loop recorder implant;  Surgeon: Edu Fontaine MD;  [facility];  Service: Cardiology    CHOLECYSTECTOMY      COLONOSCOPY      ESOPHAGOGASTRODUODENOSCOPY N/A 09/28/2016    Procedure: ESOPHAGOGASTRODUODENOSCOPY (EGD);  Surgeon: Mylene Moeller MD;  Location: [facility];  Service:     HERNIA REPAIR      HYSTERECTOMY      TTAH-BSO age 28    LAMINECTOMY      LUMBAR LAMINECTOMY      OOPHORECTOMY      age 28    MI ARTHRODESIS POSTERIOR/PSTLAT TQ 1NTRSPC THORACIC N/A 06/04/2018    Procedure: Reopening of lumbar incision for T12-L5 posterior instrumented fixation and fusion and T12-L4 posterior decompression;  Surgeon: Wood Rogel MD;  Location: [facility];  Service: Neurosurgery    MI COLONOSCOPY FLX DX W/COLLJ SPEC WHEN PFRMD N/A 03/02/2016    Procedure: EGD AND COLONOSCOPY;  Surgeon: Mylene Moeller MD;  [facility]: [facility];  Service: Gastroenterology    MI DILATION ESOPH UNGUIDED SOUND/BOUGIE 1/MULT PASS N/A 09/28/2016    Procedure: DILATATION ESOPHAGEAL;  Surgeon: Mylene Moeller MD;  [facility]: [facility];  Service: Gastroenterology    MI ESOPHAGOGASTRODUODENOSCOPY TRANSORAL DIAGNOSTIC N/A 07/18/2016    Procedure: ESOPHAGOGASTRODUODENOSCOPY (EGD);  Surgeon:  Mylene Moeller MD;  [facility]: [facility];  Service: Gastroenterology    VA INSJ/RPLCMT SPINAL NPG/RCVR POCKET CRTJ&CONNJ Left 06/04/2018    Procedure: removal of left buttock implantable pulse generator and placement of new  implantable pulse generator;  Surgeon: Wood Rogel MD;  [facility];  Service: Neurosurgery       Meds/Allergies:  Prior to Admission medications    Medication Sig Start Date End Date Taking? Authorizing Provider   amLODIPine (NORVASC) 5 mg tablet Take 1 tablet (5 mg total) by mouth daily 1/17/24 5/22/24 Yes Alejandro Philip MD   aspirin 81 mg chewable tablet Chew 81 mg daily   Yes Historical Provider, MD   atorvastatin (LIPITOR) 40 mg tablet TAKE ONE TABLET BY MOUTH ONCE DAILY 11/22/22  Yes NANY Pollock   ferrous sulfate 325 (65 Fe) mg tablet Take 1 tablet (325 mg total) by mouth daily with breakfast 1/10/24  Yes Paco Boyer MD   furosemide (LASIX) 20 mg tablet Take 20 mg by mouth daily   Yes Historical Provider, MD   latanoprost (XALATAN) 0.005 % ophthalmic solution 1 drop daily at bedtime   Yes Historical Provider, MD   levothyroxine 75 mcg tablet Take 0.5 tablets (37.5 mcg total) by mouth daily in the early morning  Patient taking differently: Take 25 mcg by mouth daily in the early morning 1/17/24 5/22/24 Yes Alejandro Philip MD   MAGNESIUM OXIDE 400 PO Take 400 mg by mouth 2 (two) times a day   Yes Historical Provider, MD   meclizine (ANTIVERT) 25 mg tablet Take 1 tablet (25 mg total) by mouth 4 (four) times a day as needed for dizziness 3/26/23 5/22/24 Yes Venice Baires DO   metFORMIN (GLUCOPHAGE) 1000 MG tablet Take 1 tablet (1,000 mg total) by mouth 2 (two) times a day with meals  Patient taking differently: Take 500 mg by mouth 2 (two) times a day with meals 2/10/23  Yes NANY Pollock   metoprolol succinate (TOPROL-XL) 50 mg 24 hr tablet Take 1 tablet (50 mg total) by mouth every 12 (twelve) hours 11/15/23  Yes Gustabo Lara MD   Milnacipran  HCl (Savella) 100 MG TABS Take 1 tablet (100 mg total) by mouth daily 3/5/23  Yes Venice Baires DO   oxyCODONE-acetaminophen (PERCOCET)  mg per tablet Take 1 tablet by mouth every 6 (six) hours as needed for moderate pain   Yes Historical Provider, MD   pantoprazole (PROTONIX) 40 mg tablet Take 1 tablet (40 mg total) by mouth daily 1/10/24 5/22/24 Yes Paco Boyer MD   senna-docusate sodium (SENOKOT-S) 8.6-50 mg per tablet Take 2 tablets by mouth if needed for constipation   Yes Historical Provider, MD   albuterol (PROVENTIL HFA,VENTOLIN HFA) 90 mcg/act inhaler Inhale 2 puffs every 6 (six) hours as needed for wheezing or shortness of breath 6/29/22   NANY Pollock   baclofen 10 mg tablet Take 10 mg by mouth 2 (two) times a day as needed for muscle spasms    Historical Provider, MD   Blood Glucose Monitoring Suppl (OneTouch Verio Reflect) w/Device KIT Check blood sugars twice daily. Please substitute with appropriate alternative as covered by patient's insurance. Dx: E11.65 2/20/23   NANY Pollock   cholestyramine (QUESTRAN) 4 g packet Take 1 packet (4 g total) by mouth 3 (three) times a day with meals  Patient not taking: Reported on 4/24/2024 10/27/22   Venice Baires DO   clopidogrel (Plavix) 75 mg tablet Take 1 tablet (75 mg total) by mouth daily 12/1/23 3/2/24  Faheem Anne PA-C   [employer] Mucus Extended Release 600 MG 12 hr tablet Take 600 mg by mouth 2 (two) times a day  Patient not taking: Reported on 4/24/2024 3/8/24   Historical Provider, MD   doxycycline hyclate (VIBRAMYCIN) 100 mg capsule Take 100 mg by mouth 2 (two) times a day  Patient not taking: Reported on 4/24/2024 3/8/24   Historical Provider, MD   glucose blood (OneTouch Verio) test strip Check blood sugars twice daily. Please substitute with appropriate alternative as covered by patient's insurance. Dx: E11.65 2/20/23   NANY Pollock   OneTouch Delica Lancets 33G MISC Check blood sugars twice  daily. Please substitute with appropriate alternative as covered by patient's insurance. Dx: E11.65 23   NANY Pollock   trospium (SANCTURA XR) 60 mg 24 hr capsule Take 1 capsule (60 mg total) by mouth daily before breakfast 4/3/23   NANY Pollock   sucralfate (CARAFATE) 1 g tablet Take 1 tablet (1 g total) by mouth 4 (four) times a day 7/18/22 10/13/22  Reese Peña MD     I have reviewed home medications using recent Epic encounter.    Allergies:   Allergies   Allergen Reactions    Penicillins Anaphylaxis, Hives and Other (See Comments)     Other reaction(s): Unknown Reaction    Sulfa Antibiotics Anaphylaxis and Other (See Comments)     Other reaction(s): Unknown Reaction    Aspartame - Food Allergy Other (See Comments) and Hypertension     Slurred speech, weakness, stroke sx    Iodinated Contrast Media Hives     Pt has taken prep prior for contrast and has not had any break through reaction    Keflex [Cephalexin] Hives       Social History:  Marital Status:    Occupation:   Patient Pre-hospital Living Situation: Long Term Care Facility  Patient Pre-hospital Level of Mobility: walks with walker  Patient Pre-hospital Diet Restrictions: Diabetic  Substance Use History:   Social History     Substance and Sexual Activity   Alcohol Use Not Currently    Comment: Denied history of alcohol use     Social History     Tobacco Use   Smoking Status Former    Current packs/day: 0.00    Types: Cigarettes    Quit date: 1970    Years since quittin.4   Smokeless Tobacco Never   Tobacco Comments    Denied history of current ever day smoker, Former smoker and Never smoker all documented in [employer]     Social History     Substance and Sexual Activity   Drug Use No    Comment: Denied history of drug use       Family History:  Family History   Problem Relation Age of Onset    Lung cancer Mother 46    Pulmonary embolism Father     No Known Problems Sister     No Known Problems Daughter     No Known  Problems Daughter     Stroke Maternal Grandmother     Heart attack Maternal Grandfather     No Known Problems Paternal Grandmother     No Known Problems Paternal Grandfather     No Known Problems Maternal Aunt     Diabetes Family         Diabetes mellitus    Hypertension Family     Stroke Family         Stroke complications       Physical Exam:     Vitals:   Blood Pressure: 120/78 (05/22/24 1857)  Pulse: 69 (05/22/24 1857)  Temperature: 98 °F (36.7 °C) (05/22/24 1857)  Temp Source: Temporal (05/22/24 1857)  Respirations: 18 (05/22/24 1857)  Height: 5' (152.4 cm) (05/22/24 1857)  Weight - Scale: 82.7 kg (182 lb 5.1 oz) (05/22/24 1456)  SpO2: 94 % (05/22/24 1857)    Physical Exam  Vitals and nursing note reviewed.   Constitutional:       General: She is not in acute distress.     Appearance: She is well-developed.   HENT:      Head: Normocephalic and atraumatic.   Eyes:      Conjunctiva/sclera: Conjunctivae normal.   Cardiovascular:      Rate and Rhythm: Normal rate.   Pulmonary:      Effort: Pulmonary effort is normal. No respiratory distress.   Abdominal:      Palpations: Abdomen is soft.      Tenderness: There is no abdominal tenderness.   Musculoskeletal:         General: No swelling.      Cervical back: Neck supple.      Right lower leg: Edema present.      Left lower leg: Edema present.      Comments: 3+ pitting edema bilateral lower extremities   Skin:     General: Skin is warm and dry.      Findings: Erythema present.   Neurological:      Mental Status: She is alert.   Psychiatric:         Mood and Affect: Mood normal.            Additional Data:     Lab Results:  Results from last 7 days   Lab Units 05/22/24  1544   WBC Thousand/uL 7.11   HEMOGLOBIN g/dL 11.2*   HEMATOCRIT % 35.8   PLATELETS Thousands/uL 290   SEGS PCT % 54   LYMPHO PCT % 29   MONO PCT % 10   EOS PCT % 6     Results from last 7 days   Lab Units 05/22/24  1544   SODIUM mmol/L 139   POTASSIUM mmol/L 4.5   CHLORIDE mmol/L 104   CO2 mmol/L 30    BUN mg/dL 22   CREATININE mg/dL 1.35*   ANION GAP mmol/L 5   CALCIUM mg/dL 9.1   ALBUMIN g/dL 3.9   TOTAL BILIRUBIN mg/dL 0.28   ALK PHOS U/L 126*   ALT U/L 8   AST U/L 16   GLUCOSE RANDOM mg/dL 80     Results from last 7 days   Lab Units 05/22/24  1544   INR  0.94     Results from last 7 days   Lab Units 05/22/24  2110   POC GLUCOSE mg/dl 137               Lines/Drains:  Invasive Devices       Peripheral Intravenous Line  Duration             Peripheral IV 05/22/24 Dorsal (posterior);Left Forearm <1 day                        Imaging: Personally reviewed the following imaging: chest xray  XR chest 1 view portable    (Results Pending)       EKG and Other Studies Reviewed on Admission:   EKG: Sinus rhythm with marked sinus arrhythmia; heart rate 77    ** Please Note: This note has been constructed using a voice recognition system. **

## 2024-05-23 NOTE — PHYSICAL THERAPY NOTE
"    PHYSICAL THERAPY EVALUATION          Patient Name: Mattie Varela  Today's Date: 5/23/2024 05/23/24 0902   PT Last Visit   PT Visit Date 05/23/24   Note Type   Note type Evaluation   Pain Assessment   Pain Assessment Tool 0-10   Pain Score 6   Pain Location/Orientation Orientation: Bilateral;Location: Leg   Hospital Pain Intervention(s) Repositioned;Ambulation/increased activity;Emotional support;Rest   Restrictions/Precautions   Weight Bearing Precautions Per Order No   Other Precautions Telemetry;Fall Risk;Pain   Home Living   Type of Home Assisted living   Bathroom Shower/Tub Walk-in shower   Bathroom Equipment Shower chair   Home Equipment Other (Comment)  (Rollator)   Additional Comments from [facility]   Prior Function   Level of [place] Independent with ADLs;Independent with functional mobility;Needs assistance with IADLS   Lives With Facility staff   Receives Help From Other (Comment);Home health  (staff. gets PT x2/wk and OT x1/wk)   IADLs Family/Friend/Other provides transportation;Family/Friend/Other provides meals;Family/Friend/Other provides medication management   Falls in the last 6 months 0   Comments indep for ADLs (except for showers) and ambulation w Rollator   General   Additional Pertinent History pt admitted 5/22/24 for leg swelling. up and oob orders. PMHx significant for T2DM, CVA, COPD, CKD, chronic pain disorder, glaucoma, anxiety, tremors, obesity   Cognition   Overall Cognitive Status WFL   Arousal/Participation Cooperative   Orientation Level Oriented to situation   Memory Within functional limits   Following Commands Follows one step commands without difficulty   Comments flat affect   Subjective   Subjective \"My tremors come on when I dont drink enough liquid\"   Transfers   Sit to Stand 5  Supervision   Additional items Armrests;Increased time required   Stand to Sit 5  Supervision   Additional items Increased time required;Other;Armrests  (RW)   Ambulation/Elevation "   Gait pattern Forward Flexion;Short stride;Excessively slow   Gait Assistance 5  Supervision   Additional items Assist x 1   Assistive Device Rolling walker   Distance 35'   Balance   Static Standing Fair   Dynamic Standing Fair -   Ambulatory Fair -   Endurance Deficit   Endurance Deficit No   Activity Tolerance   Activity Tolerance Patient tolerated treatment well;Other (Comment)  (self limiting)   Nurse Made Aware yes   Assessment   Prognosis Good   Assessment Mattie Varela is a 79 y.o. female admitted to [facility] on 5/22/2024 for Leg swelling. PT was consulted and pt was seen on 5/23/2024 for mobility assessment and d/c planning. Pt presents w high fall risk, telemetry monitoring, BLE pain. At baseline is indep for ADLs (except showers) and ambulation w Rollator. Pt is currently functioning at a S level for transfers and ambulation w RW. Pt demonstrated ability to ambulate household distances. Decline further ambulation at this time dt fear of falling. Reports increased fall risk dt tremors however no tremors observed. Pt w steady gait and no LOB. Appears to be functioning at baseline. Pt will benefit from continued mobilization via nsg/restorative. The patient's AM-PAC Basic Mobility Inpatient Short Form Raw Score is 19. A Raw score of greater than 16 suggests the patient may benefit from discharge to home.   Barriers to Discharge None   Plan   PT Frequency   (d/c PT; maintain on restorative)   Discharge Recommendation   Rehab Resource Intensity Level, PT No post-acute rehabilitation needs  (cont PT/OT at facility as appropriate)   AM-PAC Basic Mobility Inpatient   Turning in Flat Bed Without Bedrails 4   Lying on Back to Sitting on Edge of Flat Bed Without Bedrails 3   Moving Bed to Chair 3   Standing Up From Chair Using Arms 3   Walk in Room 3   Climb 3-5 Stairs With Railing 3   Basic Mobility Inpatient Raw Score 19   Basic Mobility Standardized Score 42.48   [facility] Level Of Mobility   [facility]  Goal 6: Walk 10 steps or more   -HLM Achieved 7: Walk 25 feet or more   End of Consult   Patient Position at End of Consult Bedside chair;All needs within reach     History: co - morbidities including age, use of assistive device, assist for iadl's, current experience including fall risk  Exam: impairments in systems including multiple body structures involved; neuromuscular (balance, gait, transfers), cognition; am-pac  Clinical: stable/unpredictable  Complexity: moderate    Orquidea Kohli, PT

## 2024-05-23 NOTE — PROGRESS NOTES
[facility]  Progress Note  Name: Mattie Varela I  MRN: [id number]  Unit/Bed#: E4 -01 I Date of Admission: 5/22/2024   Date of Service: 5/23/2024 I Hospital Day: 1    Assessment & Plan   * Leg swelling  Assessment & Plan  Patient presents with complaints of leg swelling; noted to have 21 pound weight gain in the last 3 months  Has had similar problems in the past; denies shortness of breath or chest pain  Echo 2023: EF 70%, 1 abnormal relaxation  Home regimen Lasix 20 mg twice daily  Was to have echocardiogram done as an outpatient; will order while inpatient  Echocardiogram pending  BNP noted to be 302; this appears to be around her baseline  Maintain fluid restricted and low-sodium diet  Compression stocking  Continue IV lasix 40 mg BID  Monitor I and O and daily standing weights    Stage 3a chronic kidney disease (HCC)  Assessment & Plan  Lab Results   Component Value Date    EGFR 39 05/23/2024    EGFR 36 05/22/2024    EGFR 47 05/01/2024    CREATININE 1.27 05/23/2024    CREATININE 1.35 (H) 05/22/2024    CREATININE 1.10 05/01/2024   Patient with history of CKD 3; baseline creatinine is approximately 1.0  Currently near baseline; monitor on morning labs  May be slightly elevated in the setting of fluid overload; initiated diuresis and monitor for improvement    Chronic obstructive pulmonary disease, unspecified COPD type (HCC)  Assessment & Plan  History of COPD; currently 94% SpO2 on room air  Denies symptoms of shortness of breath/dyspnea on exertion; not in acute exacerbation  Continue home therapies    Cerebrovascular accident (CVA), unspecified mechanism (HCC)  Assessment & Plan  Patient with history of CVA; continue aspirin, Plavix, Lipitor  Denies stroke-like symptoms, monitor    Iron deficiency anemia secondary to inadequate dietary iron intake  Assessment & Plan  Hemoglobin stable 11.2; continue oral iron therapy    Hypertension  Assessment & Plan  Blood pressure  well-controlled on admission  Home regimen amlodipine 5 mg daily, metoprolol succinate 50 mg twice daily   Held amlodipine in the setting of leg swelling  Continue metoprolol    Type 2 diabetes mellitus with other skin complications (HCC)  Assessment & Plan  Lab Results   Component Value Date    HGBA1C 6.5 (H) 2023     Recent Labs     24  2110 24  0731 24  1112   POCGLU 137 130 207*   Hold oral metformin   Placed on insulin sliding scale          VTE Pharmacologic Prophylaxis:   Pharmacologic: Heparin  Mechanical VTE Prophylaxis in Place: No    AM-PAC Basic Mobility:  Basic Mobility Inpatient Raw Score: 19  JH-HLM Achieved: 7: Walk 25 feet or more  JH-HLM Goal: 6: Walk 10 steps or more    Discharge Plan: With need for continued inpatient stay for ongoing IV diuresis, pending echocardiogram    Discussions with Specialists or Other Care Team Provider: nursing, CM    Education and Discussions with Family / Patient:     Time Spent for Care: This time was spent on one or more of the following: performing physical exam; counseling and coordination of care; obtaining or reviewing history; documenting in the medical record; reviewing/ordering tests, medications, or procedures; communicating with other healthcare professionals and discussing with patient's family/caregivers.    Current Length of Stay: 1 day(s)  Current Patient Status: Inpatient   Code Status: Level 1 - Full Code    Subjective:   Seen during rounds.  Patient reports feeling okay.  Her lower extremities are decreased from when she initially came in but still swollen.  Denies any shortness of breath or chest pain.    Objective:     Vitals:   Temp (24hrs), Av.8 °F (36.6 °C), Min:97 °F (36.1 °C), Max:98.6 °F (37 °C)    Temp:  [97 °F (36.1 °C)-98.6 °F (37 °C)] 97.5 °F (36.4 °C)  HR:  [66-94] 70  Resp:  [14-18] 18  BP: (101-166)/(50-79) 125/58  SpO2:  [92 %-99 %] 92 %  Body mass index is 33.2 kg/m².     Input and Output Summary (last 24  hours):       Intake/Output Summary (Last 24 hours) at 5/23/2024 1319  Last data filed at 5/23/2024 1300  Gross per 24 hour   Intake 720 ml   Output 2150 ml   Net -1430 ml       Physical Exam:     Physical Exam  Vitals reviewed.   Constitutional:       Appearance: She is obese. She is not toxic-appearing.   HENT:      Head: Normocephalic and atraumatic.   Eyes:      General: No scleral icterus.  Cardiovascular:      Rate and Rhythm: Normal rate and regular rhythm.   Pulmonary:      Effort: Pulmonary effort is normal. No respiratory distress.      Breath sounds: Normal breath sounds. No stridor. No wheezing or rhonchi.   Abdominal:      General: There is no distension.      Palpations: Abdomen is soft. There is no mass.      Tenderness: There is no abdominal tenderness.      Hernia: No hernia is present.   Musculoskeletal:         General: Swelling present.      Cervical back: Neck supple.      Right lower leg: Edema present.      Left lower leg: Edema present.   Skin:     General: Skin is warm and dry.   Neurological:      Mental Status: She is alert and oriented to person, place, and time. Mental status is at baseline.   Psychiatric:         Mood and Affect: Mood normal.         Behavior: Behavior normal.         Additional Data:     Labs:    Results from last 7 days   Lab Units 05/23/24  0531   WBC Thousand/uL 4.98   HEMOGLOBIN g/dL 10.6*   HEMATOCRIT % 33.3*   PLATELETS Thousands/uL 258   SEGS PCT % 42*   LYMPHO PCT % 40   MONO PCT % 11   EOS PCT % 6     Results from last 7 days   Lab Units 05/23/24  0531   POTASSIUM mmol/L 4.2   CHLORIDE mmol/L 102   CO2 mmol/L 32   BUN mg/dL 26*   CREATININE mg/dL 1.27   CALCIUM mg/dL 9.0   ALK PHOS U/L 103   ALT U/L 7   AST U/L 14     Results from last 7 days   Lab Units 05/22/24  1544   INR  0.94       * I Have Reviewed All Lab Data Listed Above.  * Additional Pertinent Lab Tests Reviewed: All Labs For Current Hospital Admission Reviewed    Imaging:    Imaging Reports  Reviewed Today Include:   Imaging Personally Reviewed by Myself Includes:      Recent Cultures (last 7 days):           Lines/Drains:  Invasive Devices       Peripheral Intravenous Line  Duration             Peripheral IV 05/22/24 Dorsal (posterior);Left Forearm <1 day              Drain  Duration             External Urinary Catheter <1 day                    Last 24 Hours Medication List:   Current Facility-Administered Medications   Medication Dose Route Frequency Provider Last Rate    acetaminophen  650 mg Oral Q6H PRN Gee Loza MD      aspirin  81 mg Oral Daily Gee Loza MD      atorvastatin  40 mg Oral Daily Gee Loza MD      baclofen  10 mg Oral BID PRN Gee Loza MD      clopidogrel  75 mg Oral Daily Gee Loza MD      ferrous sulfate  325 mg Oral Daily With Breakfast Gee Loza MD      furosemide  40 mg Intravenous BID (diuretic) Gee Loza MD      heparin (porcine)  5,000 Units Subcutaneous Q8H [facility] Gee Loza MD      HYDROmorphone  0.2 mg Intravenous Q6H PRN NANY Miller      insulin lispro  1-5 Units Subcutaneous TID AC Gee Loza MD      insulin lispro  1-5 Units Subcutaneous HS Gee Loza MD      latanoprost  1 drop Both Eyes HS Gee Loza MD      levothyroxine  25 mcg Oral Early Morning Gee Loza MD      magnesium Oxide  400 mg Oral BID Gee Loza MD      meclizine  25 mg Oral 4x Daily PRN Gee Loza MD      metoprolol succinate  50 mg Oral Q12H [facility] Gee Loza MD      Milnacipran HCl  1 tablet Oral Daily Gee Loza MD      ondansetron  4 mg Intravenous Q6H PRN Gee Loza MD      oxybutynin  10 mg Oral Daily Gee Loza MD      oxyCODONE-acetaminophen  2 tablet Oral Q6H PRN Gee Loza MD      pantoprazole  40 mg Oral Daily Before Breakfast Gee Loza MD      senna-docusate sodium  2 tablet Oral BID Gee Loza MD          Today, Patient Was Seen By: Susannah Mcclain PA-C    ** Please Note: This note has been constructed using a  voice recognition system. **

## 2024-05-23 NOTE — APP STUDENT NOTE
[facility] STUDENT  Inpatient Progress Note for TRAINING ONLY  Not Part of Legal Medical Record       Progress Note - Mattie Varela 79 y.o. female MRN: [id number]    Unit/Bed#: E4 -01 Encounter: [id number]      Assessment:  Leg swelling  Chronic kidney disease  Chronic obstructive pulmonary disease  Hypertension  History of CVA  History of urinary incontinence  Type 2 DM without current long term insulin use  Hypothyroidism     Plan:  Leg swelling  Patient presented yesterday with bilateral leg swelling, reported 21lb weight gain in past three months.   Stated swelling became progressively worse causing her pain, noticed weeping from legs.  IV diuresis initiated on admission  Weight down to 170lbs today from 182lbs on 5/22  BNP at baseline 302.  Echo scheduled outpatient, being done today 5/23. Results pending.  Continue Lasix 40mg BID  Monitor daily weights, I/Os  Chronic kidney disease  Cr 1.0 at baseline, currently 1.28 suspect in setting of volume overload  Continue to monitor daily BMPs  Continue BID lasix 40mg  Chronic obstructive pulmonary disease  Currently 92% on RA, not in acute exacerbation  Hypertension  Currently controlled.  Home regimen includes Toprol, amlodipine   Hold amlodipine in the setting of leg swelling  History of CVA  History of CVA 2/25/2022.  Continue home regimen of Plavix, ASA and atorvastatin  Urinary incontinence   Continue oxybutynin 10mg daily   Encourage continued outpatient follow up with urology.   Type 2 DM without current long term insulin use  Diet controlled. Last A1C in November 2023 was 6.5  Continue ISS while inpatient.   Continue POC glucose monitoring.   Hypothyroidism  Continue home levothyroxine regimen.    Subjective:   Patient examined at bedside. Patient reports increased pain overnight in her legs. Reports worry about her fluid restriction due to her kidney disease. Denies any other complaints at this time. No issues with defecation or urination. Denies chest  pain, shortness of breath, lightheadedness or dizziness at this time.    Objective:     Vitals: Blood pressure 125/58, pulse 69, temperature 97.5 °F (36.4 °C), temperature source Temporal, resp. rate 18, height 5' (1.524 m), weight 77.4 kg (170 lb 10.2 oz), SpO2 92%, not currently breastfeeding.,Body mass index is 33.33 kg/m².      Intake/Output Summary (Last 24 hours) at 5/23/2024 1108  Last data filed at 5/23/2024 0900  Gross per 24 hour   Intake 340 ml   Output 2000 ml   Net -1660 ml       Physical Exam  Constitutional:       General: She is not in acute distress.     Appearance: She is ill-appearing. She is not toxic-appearing.   Cardiovascular:      Rate and Rhythm: Normal rate and regular rhythm.      Pulses: Normal pulses.      Heart sounds: Normal heart sounds.   Pulmonary:      Effort: Pulmonary effort is normal. No respiratory distress.      Breath sounds: Normal breath sounds. No stridor. No wheezing or rhonchi.   Abdominal:      General: There is no distension.      Palpations: Abdomen is soft.      Tenderness: There is no abdominal tenderness.   Musculoskeletal:      Right lower leg: Edema present.      Left lower leg: Edema present.   Skin:     Comments: Dressing in place over wounds on RLE. Sensations intact. +1 pitting edema noted bilaterally.    Neurological:      Mental Status: She is alert and oriented to person, place, and time. Mental status is at baseline.      Sensory: No sensory deficit.   Psychiatric:         Mood and Affect: Mood normal.         Behavior: Behavior normal.          Invasive Devices       Peripheral Intravenous Line  Duration             Peripheral IV 05/22/24 Dorsal (posterior);Left Forearm <1 day              Drain  Duration             External Urinary Catheter <1 day                    Lab, Imaging and other studies: I have personally reviewed pertinent reports.    VTE Pharmacologic Prophylaxis: Heparin

## 2024-05-23 NOTE — PLAN OF CARE
Problem: OCCUPATIONAL THERAPY ADULT  Goal: Performs self-care activities at highest level of function for planned discharge setting.  See evaluation for individualized goals.  Description: Treatment Interventions: ADL retraining, Visual perceptual retraining, Functional transfer training, UE strengthening/ROM, Endurance training, Energy conservation, Activityengagement          See flowsheet documentation for full assessment, interventions and recommendations.   Outcome: Progressing  Note: Limitation: Decreased UE strength, Decreased Safe judgement during ADL, Decreased endurance, Decreased ADL status  Prognosis: Good  Assessment: Patient is a 79 y.o. year old female seen for OT eval s/p admit to [facility] on 5/22/2024 with BLE swelling, blisters and weeping.  Patient with active OT orders and activity orders for Activity as tolerated, Up and OOB as tolerated . OT consulted to assess ADLs/IADLs/functional mobility and assist w/ D/C planning. PTA, was (I) with ADLs, required (A) with IADLs, and completed functional mobility/transfers with Leah utilizing rollator . Patient lives in an [place]; has assistance with IADLs, and certain BADLs such as bathing from facility staff. [facility] provided meals, laundry and cleaning services. Patient demonstrates the following deficits impacting occupational performance: weakness, decreased strength , decreased balance, decreased activity tolerance, decreased safety awareness, and increased pain. These impairments, as well at pt’s difficulty performing ADLs, difficulty performing transfers/mobility, and fall risk , limit pt’s ability to safely engage in all baseline areas of occupation. Pt's CLOF as follows: eating/grooming: Independent, UB ADLs: supervision , LB ADLs: Mynor, toileting: Mynor, bed mobility: DNT, functional transfers: SBA-CGA, functional mobility: SBA-CGA, sitting/standing tolerance: F+/F-. Pt would benefit from continued skilled OT while in acute setting to address deficits as  defined above and to maximize (I) w/ ADLs/functional mobility. Occupational performance areas to address include: bathing/shower, toilet hygiene, dressing, functional mobility, and clothing management. Based on the aforementioned evaluation, functional performance deficits, and assessments, pt has been identified as a moderate complexity evaluation. At this time, recommendation for pt to receive post-acute rehabilitation services at a Level III (minimum resource intensity) for possible lymphedema therapy/ home OT/PT services; due to above deficits and CLOF. OT will continue to follow pt 3-5x/wk to address the goals listed below to  w/in 10-14 days.     Rehab Resource Intensity Level, OT: III (Minimum Resource Intensity) (continue with home OT/PT)

## 2024-05-23 NOTE — PLAN OF CARE
Problem: PAIN - ADULT  Goal: Verbalizes/displays adequate comfort level or baseline comfort level  Description: Interventions:  - Encourage patient to monitor pain and request assistance  - Assess pain using appropriate pain scale  - Administer analgesics based on type and severity of pain and evaluate response  - Implement non-pharmacological measures as appropriate and evaluate response  - Consider cultural and social influences on pain and pain management  - Notify physician/advanced practitioner if interventions unsuccessful or patient reports new pain  Outcome: Progressing     Problem: INFECTION - ADULT  Goal: Absence or prevention of progression during hospitalization  Description: INTERVENTIONS:  - Assess and monitor for signs and symptoms of infection  - Monitor lab/diagnostic results  - Monitor all insertion sites, i.e. indwelling lines, tubes, and drains  - Monitor endotracheal if appropriate and nasal secretions for changes in amount and color  - [place] appropriate cooling/warming therapies per order  - Administer medications as ordered  - Instruct and encourage patient and family to use good hand hygiene technique  - Identify and instruct in appropriate isolation precautions for identified infection/condition  Outcome: Progressing  Goal: Absence of fever/infection during neutropenic period  Description: INTERVENTIONS:  - Monitor WBC    Outcome: Progressing     Problem: SAFETY ADULT  Goal: Patient will remain free of falls  Description: INTERVENTIONS:  - Educate patient/family on patient safety including physical limitations  - Instruct patient to call for assistance with activity   - Consult OT/PT to assist with strengthening/mobility   - Keep Call bell within reach  - Keep bed low and locked with side rails adjusted as appropriate  - Keep care items and personal belongings within reach  - Initiate and maintain comfort rounds  - Make Fall Risk Sign visible to staff  - Offer Toileting every 2 Hours,  in advance of need  - Initiate/Maintain bed alarm  - Obtain necessary fall risk management equipment:   - Apply yellow socks and bracelet for high fall risk patients  - Consider moving patient to room near nurses station  Outcome: Progressing  Goal: Maintain or return to baseline ADL function  Description: INTERVENTIONS:  -  Assess patient's ability to carry out ADLs; assess patient's baseline for ADL function and identify physical deficits which impact ability to perform ADLs (bathing, care of mouth/teeth, toileting, grooming, dressing, etc.)  - Assess/evaluate cause of self-care deficits   - Assess range of motion  - Assess patient's mobility; develop plan if impaired  - Assess patient's need for assistive devices and provide as appropriate  - Encourage maximum independence but intervene and supervise when necessary  - Involve family in performance of ADLs  - Assess for home care needs following discharge   - Consider OT consult to assist with ADL evaluation and planning for discharge  - Provide patient education as appropriate  Outcome: Progressing  Goal: Maintains/Returns to pre admission functional level  Description: INTERVENTIONS:  - Perform AM-PAC 6 Click Basic Mobility/ Daily Activity assessment daily.  - Set and communicate daily mobility goal to care team and patient/family/caregiver.   - Collaborate with rehabilitation services on mobility goals if consulted  - Perform Range of Motion 3 times a day.  - Reposition patient every 2 hours.  - Dangle patient 3 times a day  - Stand patient 3 times a day  - Ambulate patient 3 times a day  - Out of bed to chair 3 times a day   - Out of bed for meals 3 times a day  - Out of bed for toileting  - Record patient progress and toleration of activity level   Outcome: Progressing     Problem: DISCHARGE PLANNING  Goal: Discharge to home or other facility with appropriate resources  Description: INTERVENTIONS:  - Identify barriers to discharge w/patient and caregiver  -  Arrange for needed discharge resources and transportation as appropriate  - Identify discharge learning needs (meds, wound care, etc.)  - Arrange for interpretive services to assist at discharge as needed  - Refer to Case Management Department for coordinating discharge planning if the patient needs post-hospital services based on physician/advanced practitioner order or complex needs related to functional status, cognitive ability, or social support system  Outcome: Progressing     Problem: Knowledge Deficit  Goal: Patient/family/caregiver demonstrates understanding of disease process, treatment plan, medications, and discharge instructions  Description: Complete learning assessment and assess knowledge base.  Interventions:  - Provide teaching at level of understanding  - Provide teaching via preferred learning methods  Outcome: Progressing     Problem: Prexisting or High Potential for Compromised Skin Integrity  Goal: Skin integrity is maintained or improved  Description: INTERVENTIONS:  - Identify patients at risk for skin breakdown  - Assess and monitor skin integrity  - Assess and monitor nutrition and hydration status  - Monitor labs   - Assess for incontinence   - Turn and reposition patient  - Assist with mobility/ambulation  - Relieve pressure over bony prominences  - Avoid friction and shearing  - Provide appropriate hygiene as needed including keeping skin clean and dry  - Evaluate need for skin moisturizer/barrier cream  - Collaborate with interdisciplinary team   - Patient/family teaching  - Consider wound care consult   Outcome: Progressing     Problem: CARDIOVASCULAR - ADULT  Goal: Maintains optimal cardiac output and hemodynamic stability  Description: INTERVENTIONS:  - Monitor I/O, vital signs and rhythm  - Monitor for S/S and trends of decreased cardiac output  - Administer and titrate ordered vasoactive medications to optimize hemodynamic stability  - Assess quality of pulses, skin color and  temperature  - Assess for signs of decreased coronary artery perfusion  - Instruct patient to report change in severity of symptoms  Outcome: Progressing  Goal: Absence of cardiac dysrhythmias or at baseline rhythm  Description: INTERVENTIONS:  - Continuous cardiac monitoring, vital signs, obtain 12 lead EKG if ordered  - Administer antiarrhythmic and heart rate control medications as ordered  - Monitor electrolytes and administer replacement therapy as ordered  Outcome: Progressing

## 2024-05-24 ENCOUNTER — APPOINTMENT (INPATIENT)
Dept: NON INVASIVE DIAGNOSTICS | Facility: HOSPITAL | Age: 79
DRG: 291 | End: 2024-05-24
Payer: MEDICARE

## 2024-05-24 PROBLEM — M79.604 LEG PAIN, RIGHT: Status: ACTIVE | Noted: 2024-05-24

## 2024-05-24 LAB
ANION GAP SERPL CALCULATED.3IONS-SCNC: 7 MMOL/L (ref 4–13)
BUN SERPL-MCNC: 31 MG/DL (ref 5–25)
CALCIUM SERPL-MCNC: 8.9 MG/DL (ref 8.4–10.2)
CHLORIDE SERPL-SCNC: 99 MMOL/L (ref 96–108)
CO2 SERPL-SCNC: 33 MMOL/L (ref 21–32)
CREAT SERPL-MCNC: 1.39 MG/DL (ref 0.6–1.3)
GFR SERPL CREATININE-BSD FRML MDRD: 35 ML/MIN/1.73SQ M
GLUCOSE SERPL-MCNC: 114 MG/DL (ref 65–140)
GLUCOSE SERPL-MCNC: 133 MG/DL (ref 65–140)
GLUCOSE SERPL-MCNC: 136 MG/DL (ref 65–140)
GLUCOSE SERPL-MCNC: 156 MG/DL (ref 65–140)
GLUCOSE SERPL-MCNC: 168 MG/DL (ref 65–140)
POTASSIUM SERPL-SCNC: 4.2 MMOL/L (ref 3.5–5.3)
SODIUM SERPL-SCNC: 139 MMOL/L (ref 135–147)

## 2024-05-24 PROCEDURE — 93970 EXTREMITY STUDY: CPT

## 2024-05-24 PROCEDURE — 93970 EXTREMITY STUDY: CPT | Performed by: SURGERY

## 2024-05-24 PROCEDURE — 97530 THERAPEUTIC ACTIVITIES: CPT

## 2024-05-24 PROCEDURE — 97535 SELF CARE MNGMENT TRAINING: CPT

## 2024-05-24 PROCEDURE — 82948 REAGENT STRIP/BLOOD GLUCOSE: CPT

## 2024-05-24 PROCEDURE — 80048 BASIC METABOLIC PNL TOTAL CA: CPT | Performed by: PHYSICIAN ASSISTANT

## 2024-05-24 PROCEDURE — 99232 SBSQ HOSP IP/OBS MODERATE 35: CPT | Performed by: PHYSICIAN ASSISTANT

## 2024-05-24 RX ORDER — FUROSEMIDE 10 MG/ML
40 INJECTION INTRAMUSCULAR; INTRAVENOUS
Status: COMPLETED | OUTPATIENT
Start: 2024-05-24 | End: 2024-05-24

## 2024-05-24 RX ADMIN — MAGNESIUM OXIDE TAB 400 MG (241.3 MG ELEMENTAL MG) 400 MG: 400 (241.3 MG) TAB at 20:11

## 2024-05-24 RX ADMIN — INSULIN LISPRO 1 UNITS: 100 INJECTION, SOLUTION INTRAVENOUS; SUBCUTANEOUS at 21:10

## 2024-05-24 RX ADMIN — OXYCODONE HYDROCHLORIDE AND ACETAMINOPHEN 2 TABLET: 5; 325 TABLET ORAL at 16:55

## 2024-05-24 RX ADMIN — HYDROMORPHONE HYDROCHLORIDE 0.2 MG: 0.2 INJECTION, SOLUTION INTRAMUSCULAR; INTRAVENOUS; SUBCUTANEOUS at 07:36

## 2024-05-24 RX ADMIN — ASPIRIN 81 MG CHEWABLE TABLET 81 MG: 81 TABLET CHEWABLE at 08:49

## 2024-05-24 RX ADMIN — FERROUS SULFATE TAB 325 MG (65 MG ELEMENTAL FE) 325 MG: 325 (65 FE) TAB at 08:50

## 2024-05-24 RX ADMIN — LEVOTHYROXINE SODIUM 25 MCG: 25 TABLET ORAL at 05:40

## 2024-05-24 RX ADMIN — PANTOPRAZOLE SODIUM 40 MG: 40 TABLET, DELAYED RELEASE ORAL at 05:40

## 2024-05-24 RX ADMIN — HEPARIN SODIUM 5000 UNITS: 5000 INJECTION INTRAVENOUS; SUBCUTANEOUS at 05:42

## 2024-05-24 RX ADMIN — FUROSEMIDE 40 MG: 10 INJECTION, SOLUTION INTRAMUSCULAR; INTRAVENOUS at 08:51

## 2024-05-24 RX ADMIN — HYDROMORPHONE HYDROCHLORIDE 0.2 MG: 0.2 INJECTION, SOLUTION INTRAMUSCULAR; INTRAVENOUS; SUBCUTANEOUS at 20:16

## 2024-05-24 RX ADMIN — OXYCODONE HYDROCHLORIDE AND ACETAMINOPHEN 2 TABLET: 5; 325 TABLET ORAL at 05:40

## 2024-05-24 RX ADMIN — METOPROLOL SUCCINATE 50 MG: 50 TABLET, EXTENDED RELEASE ORAL at 20:11

## 2024-05-24 RX ADMIN — LATANOPROST 1 DROP: 50 SOLUTION OPHTHALMIC at 21:10

## 2024-05-24 RX ADMIN — HEPARIN SODIUM 5000 UNITS: 5000 INJECTION INTRAVENOUS; SUBCUTANEOUS at 13:51

## 2024-05-24 RX ADMIN — MAGNESIUM OXIDE TAB 400 MG (241.3 MG ELEMENTAL MG) 400 MG: 400 (241.3 MG) TAB at 08:50

## 2024-05-24 RX ADMIN — FUROSEMIDE 40 MG: 10 INJECTION, SOLUTION INTRAMUSCULAR; INTRAVENOUS at 16:55

## 2024-05-24 RX ADMIN — CLOPIDOGREL BISULFATE 75 MG: 75 TABLET ORAL at 08:50

## 2024-05-24 RX ADMIN — OXYBUTYNIN CHLORIDE 10 MG: 10 TABLET, EXTENDED RELEASE ORAL at 09:01

## 2024-05-24 RX ADMIN — INSULIN LISPRO 1 UNITS: 100 INJECTION, SOLUTION INTRAVENOUS; SUBCUTANEOUS at 11:52

## 2024-05-24 RX ADMIN — HEPARIN SODIUM 5000 UNITS: 5000 INJECTION INTRAVENOUS; SUBCUTANEOUS at 21:10

## 2024-05-24 RX ADMIN — ATORVASTATIN CALCIUM 40 MG: 40 TABLET, FILM COATED ORAL at 08:51

## 2024-05-24 RX ADMIN — METOPROLOL SUCCINATE 50 MG: 50 TABLET, EXTENDED RELEASE ORAL at 08:50

## 2024-05-24 RX ADMIN — SENNOSIDES AND DOCUSATE SODIUM 2 TABLET: 8.6; 5 TABLET ORAL at 20:11

## 2024-05-24 NOTE — CASE MANAGEMENT
Case Management Discharge Planning Note    Patient name Mattie Varela  Location [facility] 4 /E4 -* MRN [id number]  : 1945 Date 2024       Current Admission Date: 2024  Current Admission Diagnosis:Leg swelling   Patient Active Problem List    Diagnosis Date Noted Date Diagnosed    Leg pain, right 2024     Leg swelling 2024     s/p Medtronic loop recorder 3/2/2024 2024     Moderate protein-calorie malnutrition (HCC) 2024     Hypertensive urgency 2024     AMS (altered mental status) 2024     Encounter for examination for admission to nursing home 2024     Stage 3a chronic kidney disease (HCC) 01/10/2024     Left ventricular outflow tract obstruction 2024     Obesity, Class I, BMI 30-34.9 2024     Urinary retention 2023     SADAF (acute kidney injury) (Former[facility]) 2023     Tremor 2023     Exertional shortness of breath 2023     Non obstructive CAD 11/15/2023     History of lumbar surgery 11/10/2023     Abnormal urinalysis 2023     Chronic obstructive pulmonary disease, unspecified COPD type ([facility]) 2023     Confusion with body shakes and blank stare 2023     Normocytic anemia 2023     Bilateral lower extremity edema 2023     Cerebrovascular accident (CVA), unspecified mechanism ([facility]) 2022     Stroke-like symptoms 2022     Prepyloric ulcer 2021     Diarrhea 2021     Mild intermittent asthma without complication 2020     S/P insertion of spinal cord stimulator 2018     Iron deficiency anemia secondary to inadequate dietary iron intake 2018     Type 2 diabetes mellitus with other skin complications ([facility])      Failed back surgical syndrome 2018     Hypothyroidism 2017     Degenerative lumbar spinal stenosis 2017     Glaucoma 2017     Overactive bladder 2016     Dyspepsia 2016     Hypercholesterolemia 2016     Anxiety  02/09/2015     Chronic pain disorder 12/18/2013     Hypertension 06/12/2013       LOS (days): 2  Geometric Mean LOS (GMLOS) (days): 2.6  Days to GMLOS:0.8     OBJECTIVE:  Risk of Unplanned Readmission Score: 29.21         Current admission status: Inpatient   Preferred Pharmacy:   [id number] - [place] - [address] [facility]  [address] [facility] [place] [address]  Phone: [phone] Fax: [phone]    [id number] - [place] - [id number] [facility] [place] [address]  Phone: [phone] Fax: [phone]    [id number] - [place] - [address] [address]  Phone: [phone] Fax: [phone]    [id number] [place] - [id number] [facility] [address]  Phone: [phone] Fax: [phone]    [facility] [place] [address]  [facility] [address]  [address] [address]  Phone: [phone] Fax: [phone]    [facility] [place] [place] - [address]  [facility],  [address]  [facility],  First Floor [facility] [address]  Phone: [phone] Fax: [phone]    Primary Care Provider: NANY Pollock    Primary Insurance: [employer] [facility]  Secondary Insurance:     DISCHARGE DETAILS:    Discharge planning discussed with:: Patient  Freedom of Choice: Yes     CM contacted family/caregiver?: Yes  Were Treatment Team discharge recommendations reviewed with patient/caregiver?: Yes  Did patient/caregiver verbalize understanding of patient care needs?: N/A- going to facility (Return to [facility])  Were patient/caregiver advised of the risks associated with not following Treatment Team discharge recommendations?: Yes         Requested Home Health Care         Is the patient interested in HHC at discharge?: Yes  Home Health Discipline requested:: Nursing  Home Health Agency  Name:: Other ([facility])  HHA External Referral Reason (only applicable if external HHA name selected): Patient has established relationship with provider  Home Health Follow-Up Provider:: PCP  Home Health Services Needed:: Wound/Ostomy Care  Homebound Criteria Met:: Uses an Assist Device (i.e. cane, walker, etc), Requires the Assistance of Another Person for Safe Ambulation or to Leave the Home  Supporting Clincal Findings:: Fatigues Easliy in Short Distances, Limited Endurance    DME Referral Provided  Referral made for DME?: No    Other Referral/Resources/Interventions Provided:  Interventions: Assisted Living, HHC  Referral Comments: Return to [facility]    Would you like to participate in our [facility] service program?  : No - Declined    Treatment Team Recommendation: Assisted Living, Home with Home Health Care  Discharge Destination Plan:: Assisted Living, Home with Home Health Care              [facility] placed PC to [facility] to arrange HHC for patient.  [facility] spoke with [facility], they provide HHC services to the patient.  Patient receives PT, OT, and SN for wound care.  [facility] will continue to provide these services at time of discharge.

## 2024-05-24 NOTE — WOUND OSTOMY CARE
Consult Note - Wound   Mattie A Nichole 79 y.o. female MRN: [id number]  Unit/Bed#: E4 -01 Encounter: [id number]      History and Present Illness:  79 year old female presented to the hospital with bilateral lower extremity swelling, pain, and drainage.  Patient's history significant for CVA, DM, anemia, HTN, CKD, COPD.    Assessment Findings:   Patient agreeable to assessment.  She is sitting up in the chair, able to stand with assist x 1 and walker.  Continent of bowel and bladder.  Nutrition team following.  Bilateral heels intact, dry.    Venous ulceration to left pretibial--partially intact blister with moderate serous drainage and pink wound bed.  Jovana-wound intact with edema.  Venous ulceration to right pretibial--open blister with pink, partial thickness tissue loss and small serous drainage.  Jovana-wound intact with edema.  Present on admission recently healed pressure injury to left buttock--patient states she has had a wound on her buttocks for quite some time.  She is unsure exactly how long.  On exam today, wound is dry and intact with scabbing/scaling.  Jovana-wound with pink and hyperpigmented, blanchable scar tissue.  No pain, induration, or fluctuance.    See flowsheet for wound details.    Patient states she has never worn compression stockings or wraps.  She is agreeable to try ACE wraps today.  Instructed patient to let her nurse know if wraps become uncomfortable or feet feel numb/tingly at all.      Wound Care Plan:   1-Silicone Cream/Hydraguard lotion to bilateral buttocks, sacrum, and heels three times daily and as needed.  2-Elevate lower extremities for edema management.  Ensure heels float off of bed/chair surface to offload pressure.  3-Offloading air cushion in chair when out of bed.  4-Apply moisturizing skin cream to body daily and as needed.  5-Turn/reposition every 2 hours while in bed and weight shift frequently while in chair for pressure re-distribution on skin.  6- Bilateral  pretibial wounds--cleanse with normal saline, pat dry.  Lotion to intact skin.  Apply oil emulsion dressing (adaptic) and calcium alginate (Melgisorb).  Cover with ABD and wrap with danita and ACE from toes to tibial tuberosity.  Change dressings daily and as needed.    Wound care team to follow.  Plan of care reviewed with primary RN.    [facility] states [facility]--will have nurses to assist with wound care on discharge.  Case management made aware that patient will need wound care on discharge.      Ambulatory referral to the wound center placed.    Wound 11/27/23 Pressure Injury Buttocks Left (Active)   Wound Image   05/24/24 1029   Wound Description Dry;Brown;Intact 05/24/24 1029   Jovana-wound Assessment Scar Tissue;Hyperpigmented;Intact 05/24/24 1029   Wound Length (cm) 0 cm 05/24/24 1029   Wound Width (cm) 0 cm 05/24/24 1029   Wound Depth (cm) 0 cm 05/24/24 1029   Wound Surface Area (cm^2) 0 cm^2 05/24/24 1029   Wound Volume (cm^3) 0 cm^3 05/24/24 1029   Calculated Wound Volume (cm^3) 0 cm^3 05/24/24 1029   Drainage Amount None 05/24/24 1029   Dressing Moisture barrier 05/24/24 1029       Wound 05/22/24 Venous Ulcer Pretibial Right (Active)   Wound Image   05/24/24 1025   Wound Description Pink 05/24/24 1025   Jovana-wound Assessment Intact;Edema 05/24/24 1025   Wound Length (cm) 2.5 cm 05/24/24 1025   Wound Width (cm) 2 cm 05/24/24 1025   Wound Depth (cm) 0.1 cm 05/24/24 1025   Wound Surface Area (cm^2) 5 cm^2 05/24/24 1025   Wound Volume (cm^3) 0.5 cm^3 05/24/24 1025   Calculated Wound Volume (cm^3) 0.5 cm^3 05/24/24 1025   Drainage Amount Small 05/24/24 1025   Drainage Description Serous 05/24/24 1025   Non-staged Wound Description Partial thickness 05/24/24 1025   Treatments Cleansed;Elevated 05/24/24 1025   Dressing Non adherent;Calcium Alginate;ABD;Gauze;Other (Comment) 05/24/24 1025   Dressing Changed Changed 05/24/24 1025   Patient Tolerance Tolerated well 05/24/24 1025    Dressing Status Clean;Dry;Intact 05/24/24 1025       Wound 05/22/24 Venous Ulcer Pretibial Left (Active)   Wound Image   05/24/24 1027   Wound Description Pink 05/24/24 1027   Jovana-wound Assessment Intact;Edema 05/24/24 1027   Wound Length (cm) 2.5 cm 05/24/24 1027   Wound Width (cm) 2.2 cm 05/24/24 1027   Wound Depth (cm) 0.1 cm 05/24/24 1027   Wound Surface Area (cm^2) 5.5 cm^2 05/24/24 1027   Wound Volume (cm^3) 0.55 cm^3 05/24/24 1027   Calculated Wound Volume (cm^3) 0.55 cm^3 05/24/24 1027   Drainage Amount Moderate 05/24/24 1027   Drainage Description Serous 05/24/24 1027   Non-staged Wound Description Not applicable 05/24/24 1027   Treatments Cleansed;Elevated 05/24/24 1027   Dressing Non adherent;Calcium Alginate;ABD;Gauze 05/24/24 1027   Dressing Changed Changed 05/24/24 1027   Patient Tolerance Tolerated well 05/24/24 1027   Dressing Status Clean;Intact;Dry 05/24/24 1027       Michell Ennis BSN, RN, CWON

## 2024-05-24 NOTE — PLAN OF CARE
Problem: PAIN - ADULT  Goal: Verbalizes/displays adequate comfort level or baseline comfort level  Description: Interventions:  - Encourage patient to monitor pain and request assistance  - Assess pain using appropriate pain scale  - Administer analgesics based on type and severity of pain and evaluate response  - Implement non-pharmacological measures as appropriate and evaluate response  - Consider cultural and social influences on pain and pain management  - Notify physician/advanced practitioner if interventions unsuccessful or patient reports new pain  Outcome: Progressing     Problem: INFECTION - ADULT  Goal: Absence or prevention of progression during hospitalization  Description: INTERVENTIONS:  - Assess and monitor for signs and symptoms of infection  - Monitor lab/diagnostic results  - Monitor all insertion sites, i.e. indwelling lines, tubes, and drains  - Monitor endotracheal if appropriate and nasal secretions for changes in amount and color  - [place] appropriate cooling/warming therapies per order  - Administer medications as ordered  - Instruct and encourage patient and family to use good hand hygiene technique  - Identify and instruct in appropriate isolation precautions for identified infection/condition  Outcome: Progressing  Goal: Absence of fever/infection during neutropenic period  Description: INTERVENTIONS:  - Monitor WBC    Outcome: Progressing     Problem: SAFETY ADULT  Goal: Patient will remain free of falls  Description: INTERVENTIONS:  - Educate patient/family on patient safety including physical limitations  - Instruct patient to call for assistance with activity   - Consult OT/PT to assist with strengthening/mobility   - Keep Call bell within reach  - Keep bed low and locked with side rails adjusted as appropriate  - Keep care items and personal belongings within reach  - Initiate and maintain comfort rounds  - Make Fall Risk Sign visible to staff  - Offer Toileting every  Hours,  in advance of need  - Initiate/Maintain alarm  - Obtain necessary fall risk management equipment:   - Apply yellow socks and bracelet for high fall risk patients  - Consider moving patient to room near nurses station  Outcome: Progressing  Goal: Maintain or return to baseline ADL function  Description: INTERVENTIONS:  -  Assess patient's ability to carry out ADLs; assess patient's baseline for ADL function and identify physical deficits which impact ability to perform ADLs (bathing, care of mouth/teeth, toileting, grooming, dressing, etc.)  - Assess/evaluate cause of self-care deficits   - Assess range of motion  - Assess patient's mobility; develop plan if impaired  - Assess patient's need for assistive devices and provide as appropriate  - Encourage maximum independence but intervene and supervise when necessary  - Involve family in performance of ADLs  - Assess for home care needs following discharge   - Consider OT consult to assist with ADL evaluation and planning for discharge  - Provide patient education as appropriate  Outcome: Progressing  Goal: Maintains/Returns to pre admission functional level  Description: INTERVENTIONS:  - Perform AM-PAC 6 Click Basic Mobility/ Daily Activity assessment daily.  - Set and communicate daily mobility goal to care team and patient/family/caregiver.   - Collaborate with rehabilitation services on mobility goals if consulted  - Perform Range of Motion  times a day.  - Reposition patient every  hours.  - Dangle patient  times a day  - Stand patient  times a day  - Ambulate patient times a day  - Out of bed to chair  times a day   - Out of bed for meals  times a day  - Out of bed for toileting  - Record patient progress and toleration of activity level   Outcome: Progressing     Problem: DISCHARGE PLANNING  Goal: Discharge to home or other facility with appropriate resources  Description: INTERVENTIONS:  - Identify barriers to discharge w/patient and caregiver  - Arrange for  needed discharge resources and transportation as appropriate  - Identify discharge learning needs (meds, wound care, etc.)  - Arrange for interpretive services to assist at discharge as needed  - Refer to Case Management Department for coordinating discharge planning if the patient needs post-hospital services based on physician/advanced practitioner order or complex needs related to functional status, cognitive ability, or social support system  Outcome: Progressing     Problem: Knowledge Deficit  Goal: Patient/family/caregiver demonstrates understanding of disease process, treatment plan, medications, and discharge instructions  Description: Complete learning assessment and assess knowledge base.  Interventions:  - Provide teaching at level of understanding  - Provide teaching via preferred learning methods  Outcome: Progressing     Problem: Prexisting or High Potential for Compromised Skin Integrity  Goal: Skin integrity is maintained or improved  Description: INTERVENTIONS:  - Identify patients at risk for skin breakdown  - Assess and monitor skin integrity  - Assess and monitor nutrition and hydration status  - Monitor labs   - Assess for incontinence   - Turn and reposition patient  - Assist with mobility/ambulation  - Relieve pressure over bony prominences  - Avoid friction and shearing  - Provide appropriate hygiene as needed including keeping skin clean and dry  - Evaluate need for skin moisturizer/barrier cream  - Collaborate with interdisciplinary team   - Patient/family teaching  - Consider wound care consult   Outcome: Progressing     Problem: CARDIOVASCULAR - ADULT  Goal: Maintains optimal cardiac output and hemodynamic stability  Description: INTERVENTIONS:  - Monitor I/O, vital signs and rhythm  - Monitor for S/S and trends of decreased cardiac output  - Administer and titrate ordered vasoactive medications to optimize hemodynamic stability  - Assess quality of pulses, skin color and temperature  -  Assess for signs of decreased coronary artery perfusion  - Instruct patient to report change in severity of symptoms  Outcome: Progressing  Goal: Absence of cardiac dysrhythmias or at baseline rhythm  Description: INTERVENTIONS:  - Continuous cardiac monitoring, vital signs, obtain 12 lead EKG if ordered  - Administer antiarrhythmic and heart rate control medications as ordered  - Monitor electrolytes and administer replacement therapy as ordered  Outcome: Progressing

## 2024-05-24 NOTE — ASSESSMENT & PLAN NOTE
Complaining of right lower leg pain and right calf  Noted wounds to bilateral shins and venous stasis changes  Obtain lower extremity venous duplex  Wound care consulted

## 2024-05-24 NOTE — ASSESSMENT & PLAN NOTE
Lab Results   Component Value Date    EGFR 35 05/24/2024    EGFR 39 05/23/2024    EGFR 36 05/22/2024    CREATININE 1.39 (H) 05/24/2024    CREATININE 1.27 05/23/2024    CREATININE 1.35 (H) 05/22/2024   Patient with history of CKD 3; baseline creatinine is approximately 1.0  Currently near baseline; monitor on morning labs  May be slightly elevated in the setting of fluid overload; initiated diuresis and monitor for improvement

## 2024-05-24 NOTE — PLAN OF CARE
Problem: OCCUPATIONAL THERAPY ADULT  Goal: Performs self-care activities at highest level of function for planned discharge setting.  See evaluation for individualized goals.  Description: Treatment Interventions: ADL retraining, Visual perceptual retraining, Functional transfer training, UE strengthening/ROM, Endurance training, Energy conservation, Activityengagement          See flowsheet documentation for full assessment, interventions and recommendations.   Outcome: Progressing  Note: Limitation: Decreased UE strength, Decreased Safe judgement during ADL, Decreased endurance, Decreased ADL status  Prognosis: Good  Assessment: Pt seen for skilled OT tx this date. Tx focused on improving strength, activity tolerance and balance, safety awareness to increase independence with self care tasks. Pt tolerated session well. Pt was limited by minimal c/o pain reports 7/10 pain.     Pt performed LB dressing / bathing supervision , Toileting supervision ,  transfers supervision, mobility with RW supervision.     Pt demonstrated ability to safely and appropriately attend to all tasks during session.     Pt required minimal verbal cuing during session to safely complete tasks.     Pt completed 1x10 of various BUE exercises to increase strength. Performed while seated. Tolerated well.     Current OT DC recommendations for pt is level 3 resources.     Rehab Resource Intensity Level, OT: III (Minimum Resource Intensity)

## 2024-05-24 NOTE — ASSESSMENT & PLAN NOTE
Patient presents with complaints of leg swelling; noted to have 21 pound weight gain in the last 3 months  Has had similar problems in the past; denies shortness of breath or chest pain  Echo 2023: EF 70%, 1 abnormal relaxation  Home regimen Lasix 20 mg twice daily  Was to have echocardiogram done as an outpatient  Echocardiogram here similar to prior: EF 70%, grade 1 abnormal relaxation  BNP noted to be 302 on admission  Maintain fluid restricted and low-sodium diet  Compression stocking  Continue IV lasix 40 mg BID today and likely stop tomorrow  Monitor I and O and daily standing weights- noted decreased in weights

## 2024-05-24 NOTE — ASSESSMENT & PLAN NOTE
Blood pressure well-controlled on admission  Home regimen amlodipine 5 mg daily, metoprolol succinate 50 mg twice daily   Held amlodipine in the setting of leg swelling  Continue metoprolol  BP stable

## 2024-05-24 NOTE — OCCUPATIONAL THERAPY NOTE
Occupational Therapy Progress Note     Patient Name: Mattie Varela  Today's Date: 5/24/2024  Problem List  Principal Problem:    Leg swelling  Active Problems:    Type 2 diabetes mellitus with other skin complications (HCC)    Hypertension    Iron deficiency anemia secondary to inadequate dietary iron intake    Cerebrovascular accident (CVA), unspecified mechanism (HCC)    Chronic obstructive pulmonary disease, unspecified COPD type (HCC)    Stage 3a chronic kidney disease (HCC)    Leg pain, right            05/24/24 1425   OT Last Visit   OT Visit Date 05/24/24   Note Type   Note Type Treatment   Pain Assessment   Pain Assessment Tool 0-10   Pain Score 7   Pain Location/Orientation Orientation: Bilateral;Location: Leg   Restrictions/Precautions   Weight Bearing Precautions Per Order No   Other Precautions Telemetry;Fall Risk   ADL   LB Dressing Assistance 5  Supervision/Setup   LB Dressing Deficit Verbal cueing;Supervision/safety;Increased time to complete;Thread RLE into pants;Thread LLE into pants   Toileting Assistance  5  Supervision/Setup   Toileting Deficit Verbal cueing;Supervison/safety;Increased time to complete   Functional Standing Tolerance   Time 2-4 min   Activity self care tasks and mobility.   Comments Rw   Transfers   Sit to Stand 5  Supervision   Additional items Armrests;Increased time required   Stand to Sit 5  Supervision   Additional items Armrests;Increased time required   Toilet transfer 5  Supervision   Additional items Standard toilet  (grab bar)   Functional Mobility   Functional Mobility 5  Supervision   Additional Comments household distances with RW   Additional items Rolling walker   Toilet Transfers   Toilet Transfer From Rolling walker   Toilet Transfer Type To and from   Toilet Transfer to Standard toilet   Toilet Transfer Technique Ambulating   Toilet Transfers Supervision   Therapeutic Excerise-Strength   UE Strength Yes   Right Upper Extremity- Strength   R Shoulder  Flexion;ABduction;Extension;Horizontal ABduction   R Elbow Elbow flexion;Elbow extension   Left Upper Extremity-Strength   L Shoulder Flexion;ABduction;Extension;Horizontal ABduction   L Elbow Elbow flexion;Elbow extension   Cognition   Overall Cognitive Status WFL   Arousal/Participation Alert;Responsive   Attention Within functional limits   Orientation Level Oriented X4   Memory Within functional limits   Following Commands Follows one step commands without difficulty   Activity Tolerance   Activity Tolerance Patient tolerated treatment well;Patient limited by pain   Medical Staff Made Aware RN   Assessment   Assessment Pt seen for skilled OT tx this date. Tx focused on improving strength, activity tolerance and balance, safety awareness to increase independence with self care tasks. Pt tolerated session well. Pt was limited by minimal c/o pain reports 7/10 pain.     Pt performed LB dressing / bathing supervision , Toileting supervision ,  transfers supervision, mobility with RW supervision.     Pt demonstrated ability to safely and appropriately attend to all tasks during session.     Pt required minimal verbal cuing during session to safely complete tasks.     Pt completed 1x10 of various BUE exercises to increase strength. Performed while seated. Tolerated well.     Current OT DC recommendations for pt is level 3 resources.   Plan   Treatment Interventions ADL retraining;Visual perceptual retraining;Functional transfer training;UE strengthening/ROM;Endurance training;Energy conservation;Activityengagement   Goal Expiration Date 06/06/24   OT Treatment Day 1   OT Frequency 3-5x/wk   Discharge Recommendation   Rehab Resource Intensity Level, OT III (Minimum Resource Intensity)   Additional Comments  The patient's raw score on the AM-PAC Daily Activity Inpatient Short Form is 21. A raw score of greater than or equal to 19 suggests the patient may benefit from discharge to home. Please refer to the recommendation  of the Occupational Therapist for safe discharge planning.   AM-PAC Daily Activity Inpatient   Lower Body Dressing 3   Bathing 3   Toileting 3   Upper Body Dressing 4   Grooming 4   Eating 4   Daily Activity Raw Score 21   Daily Activity Standardized Score (Calc for Raw Score >=11) 44.27   AM-PAC Applied Cognition Inpatient   Following a Speech/Presentation 4   Understanding Ordinary Conversation 4   Taking Medications 3   Remembering Where Things Are Placed or Put Away 3   Remembering List of 4-5 Errands 3   Taking Care of Complicated Tasks 3   Applied Cognition Raw Score 20   Applied Cognition Standardized Score 41.76   Miri Bunn

## 2024-05-24 NOTE — PROGRESS NOTES
[facility]  Progress Note  Name: Mattie Varela I  MRN: [id number]  Unit/Bed#: E4 -01 I Date of Admission: 5/22/2024   Date of Service: 5/24/2024 I Hospital Day: 2    Assessment & Plan   * Leg swelling  Assessment & Plan  Patient presents with complaints of leg swelling; noted to have 21 pound weight gain in the last 3 months  Has had similar problems in the past; denies shortness of breath or chest pain  Echo 2023: EF 70%, 1 abnormal relaxation  Home regimen Lasix 20 mg twice daily  Was to have echocardiogram done as an outpatient  Echocardiogram here similar to prior: EF 70%, grade 1 abnormal relaxation  BNP noted to be 302 on admission  Maintain fluid restricted and low-sodium diet  Compression stocking  Continue IV lasix 40 mg BID today and likely stop tomorrow  Monitor I and O and daily standing weights- noted decreased in weights    Leg pain, right  Assessment & Plan  Complaining of right lower leg pain and right calf  Noted wounds to bilateral shins and venous stasis changes  Obtain lower extremity venous duplex  Wound care consulted    Stage 3a chronic kidney disease (HCC)  Assessment & Plan  Lab Results   Component Value Date    EGFR 35 05/24/2024    EGFR 39 05/23/2024    EGFR 36 05/22/2024    CREATININE 1.39 (H) 05/24/2024    CREATININE 1.27 05/23/2024    CREATININE 1.35 (H) 05/22/2024   Patient with history of CKD 3; baseline creatinine is approximately 1.0  Currently near baseline; monitor on morning labs  May be slightly elevated in the setting of fluid overload; initiated diuresis and monitor for improvement    Chronic obstructive pulmonary disease, unspecified COPD type ([facility])  Assessment & Plan  History of COPD; currently 94% SpO2 on room air  Denies symptoms of shortness of breath/dyspnea on exertion; not in acute exacerbation  Continue home therapies    Cerebrovascular accident (CVA), unspecified mechanism ([facility])  Assessment & Plan  Patient with history of CVA;  continue aspirin, Plavix, Lipitor  Denies stroke-like symptoms, monitor    Iron deficiency anemia secondary to inadequate dietary iron intake  Assessment & Plan  Hemoglobin stable 11.2; continue oral iron therapy    Hypertension  Assessment & Plan  Blood pressure well-controlled on admission  Home regimen amlodipine 5 mg daily, metoprolol succinate 50 mg twice daily   Held amlodipine in the setting of leg swelling  Continue metoprolol  BP stable    Type 2 diabetes mellitus with other skin complications (HCC)  Assessment & Plan  Lab Results   Component Value Date    HGBA1C 6.5 (H) 11/26/2023     Recent Labs     05/23/24  1619 05/23/24  2157 05/24/24  0736 05/24/24  1101   POCGLU 150* 122 136 168*   Hold oral metformin   Placed on insulin sliding scale          VTE Pharmacologic Prophylaxis:   Pharmacologic: Heparin  Mechanical VTE Prophylaxis in Place: Yes    AM-PAC Basic Mobility:  Basic Mobility Inpatient Raw Score: 20  JH-HLM Achieved: 7: Walk 25 feet or more  JH-HLM Goal: 6: Walk 10 steps or more    Discharge Plan: Due to patient's stay for ongoing IV diuresis and lower extremity venous duplex    Discussions with Specialists or Other Care Team Provider: Nursing    Education and Discussions with Family / Patient: Patient    Time Spent for Care: This time was spent on one or more of the following: performing physical exam; counseling and coordination of care; obtaining or reviewing history; documenting in the medical record; reviewing/ordering tests, medications, or procedures; communicating with other healthcare professionals and discussing with patient's family/caregivers.    Current Length of Stay: 2 day(s)  Current Patient Status: Inpatient   Code Status: Level 1 - Full Code    Subjective:   Patient complaining of right lower leg pain today.  She reports pain began yesterday and is significantly worse in the back of her calf.  Having good urine output.    Previously residing at  [facility]  facility.      Objective:     Vitals:   Temp (24hrs), Av.8 °F (36.6 °C), Min:97.7 °F (36.5 °C), Max:97.8 °F (36.6 °C)    Temp:  [97.7 °F (36.5 °C)-97.8 °F (36.6 °C)] 97.8 °F (36.6 °C)  HR:  [70-76] 70  Resp:  [18] 18  BP: (113-138)/(54-72) 113/55  SpO2:  [94 %-99 %] 94 %  Body mass index is 33.33 kg/m².     Input and Output Summary (last 24 hours):       Intake/Output Summary (Last 24 hours) at 2024 1404  Last data filed at 2024 1346  Gross per 24 hour   Intake 1380 ml   Output 2025 ml   Net -645 ml       Physical Exam:     Physical Exam  Vitals and nursing note reviewed.   Constitutional:       General: She is not in acute distress.     Appearance: She is obese. She is not ill-appearing, toxic-appearing or diaphoretic.   HENT:      Head: Normocephalic and atraumatic.   Eyes:      General: No scleral icterus.  Cardiovascular:      Rate and Rhythm: Normal rate and regular rhythm.   Pulmonary:      Effort: Pulmonary effort is normal. No respiratory distress.      Breath sounds: Normal breath sounds. No stridor. No wheezing or rhonchi.   Abdominal:      General: Bowel sounds are normal. There is no distension.      Palpations: Abdomen is soft. There is no mass.      Tenderness: There is no abdominal tenderness.      Hernia: No hernia is present.   Musculoskeletal:         General: Swelling, tenderness and deformity present.      Cervical back: Neck supple.      Comments: Tenderness to right calf   Skin:     General: Skin is warm and dry.   Neurological:      Mental Status: She is alert and oriented to person, place, and time. Mental status is at baseline.   Psychiatric:         Mood and Affect: Mood normal.         Behavior: Behavior normal.         Additional Data:     Labs:    Results from last 7 days   Lab Units 24  0531   WBC Thousand/uL 4.98   HEMOGLOBIN g/dL 10.6*   HEMATOCRIT % 33.3*   PLATELETS Thousands/uL 258   SEGS PCT % 42*   LYMPHO PCT % 40   MONO PCT % 11   EOS PCT % 6     Results from  last 7 days   Lab Units 05/24/24  0529 05/23/24  0531   POTASSIUM mmol/L 4.2 4.2   CHLORIDE mmol/L 99 102   CO2 mmol/L 33* 32   BUN mg/dL 31* 26*   CREATININE mg/dL 1.39* 1.27   CALCIUM mg/dL 8.9 9.0   ALK PHOS U/L  --  103   ALT U/L  --  7   AST U/L  --  14     Results from last 7 days   Lab Units 05/22/24  1544   INR  0.94       * I Have Reviewed All Lab Data Listed Above.  * Additional Pertinent Lab Tests Reviewed: All Labs For Current Hospital Admission Reviewed    Imaging:    Imaging Reports Reviewed Today Include:   Imaging Personally Reviewed by Myself Includes:      Recent Cultures (last 7 days):           Lines/Drains:  Invasive Devices       Peripheral Intravenous Line  Duration             Peripheral IV 05/22/24 Dorsal (posterior);Left Forearm 1 day                    Last 24 Hours Medication List:   Current Facility-Administered Medications   Medication Dose Route Frequency Provider Last Rate    acetaminophen  650 mg Oral Q6H PRN Gee Loza MD      aspirin  81 mg Oral Daily Gee Loza MD      atorvastatin  40 mg Oral Daily Gee Loza MD      baclofen  10 mg Oral BID PRN Gee Loza MD      clopidogrel  75 mg Oral Daily Gee Loza MD      ferrous sulfate  325 mg Oral Daily With Breakfast Gee Loza MD      furosemide  40 mg Intravenous BID (diuretic) Susannah Mcclain PA-C      heparin (porcine)  5,000 Units Subcutaneous Q8H [facility] Gee Loza MD      HYDROmorphone  0.2 mg Intravenous Q6H PRN NANY Miller      insulin lispro  1-5 Units Subcutaneous TID AC Gee Loza MD      insulin lispro  1-5 Units Subcutaneous HS Gee Loza MD      latanoprost  1 drop Both Eyes HS Gee Loza MD      levothyroxine  25 mcg Oral Early Morning Gee Loza MD      magnesium Oxide  400 mg Oral BID Gee Loza MD      meclizine  25 mg Oral 4x Daily PRN Gee Loza MD      metoprolol succinate  50 mg Oral Q12H [facility] Gee Loza MD      Milnacipran HCl  1 tablet Oral Daily Gee Loza MD       ondansetron  4 mg Intravenous Q6H PRN Gee Loza MD      oxybutynin  10 mg Oral Daily Gee Loza MD      oxyCODONE-acetaminophen  2 tablet Oral Q6H PRN Gee Loza MD      pantoprazole  40 mg Oral Daily Before Breakfast Gee Loza MD      senna-docusate sodium  2 tablet Oral BID Gee Loza MD          Today, Patient Was Seen By: Susannah Mcclain PA-C    ** Please Note: This note has been constructed using a voice recognition system. **

## 2024-05-24 NOTE — PLAN OF CARE
Problem: PAIN - ADULT  Goal: Verbalizes/displays adequate comfort level or baseline comfort level  Description: Interventions:  - Encourage patient to monitor pain and request assistance  - Assess pain using appropriate pain scale  - Administer analgesics based on type and severity of pain and evaluate response  - Implement non-pharmacological measures as appropriate and evaluate response  - Consider cultural and social influences on pain and pain management  - Notify physician/advanced practitioner if interventions unsuccessful or patient reports new pain  Outcome: Progressing     Problem: INFECTION - ADULT  Goal: Absence or prevention of progression during hospitalization  Description: INTERVENTIONS:  - Assess and monitor for signs and symptoms of infection  - Monitor lab/diagnostic results  - Monitor all insertion sites, i.e. indwelling lines, tubes, and drains  - Monitor endotracheal if appropriate and nasal secretions for changes in amount and color  - [place] appropriate cooling/warming therapies per order  - Administer medications as ordered  - Instruct and encourage patient and family to use good hand hygiene technique  - Identify and instruct in appropriate isolation precautions for identified infection/condition  Outcome: Progressing  Goal: Absence of fever/infection during neutropenic period  Description: INTERVENTIONS:  - Monitor WBC    Outcome: Progressing     Problem: SAFETY ADULT  Goal: Patient will remain free of falls  Description: INTERVENTIONS:  - Educate patient/family on patient safety including physical limitations  - Instruct patient to call for assistance with activity   - Consult OT/PT to assist with strengthening/mobility   - Keep Call bell within reach  - Keep bed low and locked with side rails adjusted as appropriate  - Keep care items and personal belongings within reach  - Initiate and maintain comfort rounds  - Make Fall Risk Sign visible to staff  - Offer Toileting every 2 Hours,  in advance of need  - Initiate/Maintain bed alarm  - Obtain necessary fall risk management equipment:   - Apply yellow socks and bracelet for high fall risk patients  - Consider moving patient to room near nurses station  Outcome: Progressing  Goal: Maintain or return to baseline ADL function  Description: INTERVENTIONS:  -  Assess patient's ability to carry out ADLs; assess patient's baseline for ADL function and identify physical deficits which impact ability to perform ADLs (bathing, care of mouth/teeth, toileting, grooming, dressing, etc.)  - Assess/evaluate cause of self-care deficits   - Assess range of motion  - Assess patient's mobility; develop plan if impaired  - Assess patient's need for assistive devices and provide as appropriate  - Encourage maximum independence but intervene and supervise when necessary  - Involve family in performance of ADLs  - Assess for home care needs following discharge   - Consider OT consult to assist with ADL evaluation and planning for discharge  - Provide patient education as appropriate  Outcome: Progressing  Goal: Maintains/Returns to pre admission functional level  Description: INTERVENTIONS:  - Perform AM-PAC 6 Click Basic Mobility/ Daily Activity assessment daily.  - Set and communicate daily mobility goal to care team and patient/family/caregiver.   - Collaborate with rehabilitation services on mobility goals if consulted  - Perform Range of Motion 3 times a day.  - Reposition patient every 2 hours.  - Dangle patient 3 times a day  - Stand patient 3 times a day  - Ambulate patient 3 times a day  - Out of bed to chair 3 times a day   - Out of bed for meals 3 times a day  - Out of bed for toileting  - Record patient progress and toleration of activity level   Outcome: Progressing     Problem: DISCHARGE PLANNING  Goal: Discharge to home or other facility with appropriate resources  Description: INTERVENTIONS:  - Identify barriers to discharge w/patient and caregiver  -  Arrange for needed discharge resources and transportation as appropriate  - Identify discharge learning needs (meds, wound care, etc.)  - Arrange for interpretive services to assist at discharge as needed  - Refer to Case Management Department for coordinating discharge planning if the patient needs post-hospital services based on physician/advanced practitioner order or complex needs related to functional status, cognitive ability, or social support system  Outcome: Progressing     Problem: Knowledge Deficit  Goal: Patient/family/caregiver demonstrates understanding of disease process, treatment plan, medications, and discharge instructions  Description: Complete learning assessment and assess knowledge base.  Interventions:  - Provide teaching at level of understanding  - Provide teaching via preferred learning methods  Outcome: Progressing     Problem: Prexisting or High Potential for Compromised Skin Integrity  Goal: Skin integrity is maintained or improved  Description: INTERVENTIONS:  - Identify patients at risk for skin breakdown  - Assess and monitor skin integrity  - Assess and monitor nutrition and hydration status  - Monitor labs   - Assess for incontinence   - Turn and reposition patient  - Assist with mobility/ambulation  - Relieve pressure over bony prominences  - Avoid friction and shearing  - Provide appropriate hygiene as needed including keeping skin clean and dry  - Evaluate need for skin moisturizer/barrier cream  - Collaborate with interdisciplinary team   - Patient/family teaching  - Consider wound care consult   Outcome: Progressing     Problem: CARDIOVASCULAR - ADULT  Goal: Maintains optimal cardiac output and hemodynamic stability  Description: INTERVENTIONS:  - Monitor I/O, vital signs and rhythm  - Monitor for S/S and trends of decreased cardiac output  - Administer and titrate ordered vasoactive medications to optimize hemodynamic stability  - Assess quality of pulses, skin color and  temperature  - Assess for signs of decreased coronary artery perfusion  - Instruct patient to report change in severity of symptoms  Outcome: Progressing  Goal: Absence of cardiac dysrhythmias or at baseline rhythm  Description: INTERVENTIONS:  - Continuous cardiac monitoring, vital signs, obtain 12 lead EKG if ordered  - Administer antiarrhythmic and heart rate control medications as ordered  - Monitor electrolytes and administer replacement therapy as ordered  Outcome: Progressing

## 2024-05-24 NOTE — DISCHARGE INSTR - OTHER ORDERS
Wound Care Plan:   1-Silicone Cream/Hydraguard lotion to bilateral buttocks, sacrum, and heels three times daily and as needed.  2-Elevate lower extremities for edema management.  Ensure heels float off of bed/chair surface to offload pressure.  3-Offloading air cushion in chair when out of bed.  4-Apply lotion to body daily and as needed.  5-Turn/reposition every 2 hours while in bed and weight shift frequently while in chair for pressure re-distribution on skin.  6- Bilateral pretibial wounds--cleanse with normal saline, pat dry.  Lotion to intact skin.  Apply oil emulsion dressing (adaptic) and calcium alginate (Melgisorb).  Cover with ABD and wrap with danita and ACE from toes to tibial tuberosity.  Change dressings daily and as needed.    Follow-up at the [facility]--[phone].

## 2024-05-24 NOTE — ASSESSMENT & PLAN NOTE
Lab Results   Component Value Date    HGBA1C 6.5 (H) 11/26/2023     Recent Labs     05/23/24  1619 05/23/24  2157 05/24/24  0736 05/24/24  1101   POCGLU 150* 122 136 168*   Hold oral metformin   Placed on insulin sliding scale

## 2024-05-25 LAB
ANION GAP SERPL CALCULATED.3IONS-SCNC: 9 MMOL/L (ref 4–13)
BUN SERPL-MCNC: 31 MG/DL (ref 5–25)
CALCIUM SERPL-MCNC: 9.6 MG/DL (ref 8.4–10.2)
CHLORIDE SERPL-SCNC: 96 MMOL/L (ref 96–108)
CO2 SERPL-SCNC: 33 MMOL/L (ref 21–32)
CREAT SERPL-MCNC: 1.39 MG/DL (ref 0.6–1.3)
GFR SERPL CREATININE-BSD FRML MDRD: 35 ML/MIN/1.73SQ M
GLUCOSE SERPL-MCNC: 126 MG/DL (ref 65–140)
GLUCOSE SERPL-MCNC: 151 MG/DL (ref 65–140)
GLUCOSE SERPL-MCNC: 152 MG/DL (ref 65–140)
GLUCOSE SERPL-MCNC: 175 MG/DL (ref 65–140)
GLUCOSE SERPL-MCNC: 206 MG/DL (ref 65–140)
POTASSIUM SERPL-SCNC: 4.2 MMOL/L (ref 3.5–5.3)
SODIUM SERPL-SCNC: 138 MMOL/L (ref 135–147)

## 2024-05-25 PROCEDURE — 82948 REAGENT STRIP/BLOOD GLUCOSE: CPT

## 2024-05-25 PROCEDURE — 80048 BASIC METABOLIC PNL TOTAL CA: CPT | Performed by: PHYSICIAN ASSISTANT

## 2024-05-25 PROCEDURE — 99232 SBSQ HOSP IP/OBS MODERATE 35: CPT | Performed by: PHYSICIAN ASSISTANT

## 2024-05-25 RX ORDER — FUROSEMIDE 20 MG/1
20 TABLET ORAL DAILY
Status: DISCONTINUED | OUTPATIENT
Start: 2024-05-25 | End: 2024-05-26

## 2024-05-25 RX ADMIN — PANTOPRAZOLE SODIUM 40 MG: 40 TABLET, DELAYED RELEASE ORAL at 06:10

## 2024-05-25 RX ADMIN — HEPARIN SODIUM 5000 UNITS: 5000 INJECTION INTRAVENOUS; SUBCUTANEOUS at 05:18

## 2024-05-25 RX ADMIN — ATORVASTATIN CALCIUM 40 MG: 40 TABLET, FILM COATED ORAL at 08:17

## 2024-05-25 RX ADMIN — ASPIRIN 81 MG CHEWABLE TABLET 81 MG: 81 TABLET CHEWABLE at 08:17

## 2024-05-25 RX ADMIN — INSULIN LISPRO 1 UNITS: 100 INJECTION, SOLUTION INTRAVENOUS; SUBCUTANEOUS at 08:18

## 2024-05-25 RX ADMIN — OXYCODONE HYDROCHLORIDE AND ACETAMINOPHEN 2 TABLET: 5; 325 TABLET ORAL at 19:51

## 2024-05-25 RX ADMIN — OXYBUTYNIN CHLORIDE 10 MG: 10 TABLET, EXTENDED RELEASE ORAL at 08:18

## 2024-05-25 RX ADMIN — LATANOPROST 1 DROP: 50 SOLUTION OPHTHALMIC at 21:09

## 2024-05-25 RX ADMIN — METOPROLOL SUCCINATE 50 MG: 50 TABLET, EXTENDED RELEASE ORAL at 20:01

## 2024-05-25 RX ADMIN — MAGNESIUM OXIDE TAB 400 MG (241.3 MG ELEMENTAL MG) 400 MG: 400 (241.3 MG) TAB at 20:01

## 2024-05-25 RX ADMIN — METOPROLOL SUCCINATE 50 MG: 50 TABLET, EXTENDED RELEASE ORAL at 08:18

## 2024-05-25 RX ADMIN — INSULIN LISPRO 1 UNITS: 100 INJECTION, SOLUTION INTRAVENOUS; SUBCUTANEOUS at 12:26

## 2024-05-25 RX ADMIN — CLOPIDOGREL BISULFATE 75 MG: 75 TABLET ORAL at 08:17

## 2024-05-25 RX ADMIN — FERROUS SULFATE TAB 325 MG (65 MG ELEMENTAL FE) 325 MG: 325 (65 FE) TAB at 08:18

## 2024-05-25 RX ADMIN — SENNOSIDES AND DOCUSATE SODIUM 2 TABLET: 8.6; 5 TABLET ORAL at 08:18

## 2024-05-25 RX ADMIN — SENNOSIDES AND DOCUSATE SODIUM 2 TABLET: 8.6; 5 TABLET ORAL at 20:01

## 2024-05-25 RX ADMIN — HEPARIN SODIUM 5000 UNITS: 5000 INJECTION INTRAVENOUS; SUBCUTANEOUS at 21:09

## 2024-05-25 RX ADMIN — FUROSEMIDE 20 MG: 20 TABLET ORAL at 15:33

## 2024-05-25 RX ADMIN — OXYCODONE HYDROCHLORIDE AND ACETAMINOPHEN 2 TABLET: 5; 325 TABLET ORAL at 05:21

## 2024-05-25 RX ADMIN — MAGNESIUM OXIDE TAB 400 MG (241.3 MG ELEMENTAL MG) 400 MG: 400 (241.3 MG) TAB at 08:17

## 2024-05-25 RX ADMIN — HEPARIN SODIUM 5000 UNITS: 5000 INJECTION INTRAVENOUS; SUBCUTANEOUS at 15:33

## 2024-05-25 RX ADMIN — LEVOTHYROXINE SODIUM 25 MCG: 25 TABLET ORAL at 05:18

## 2024-05-25 RX ADMIN — INSULIN LISPRO 1 UNITS: 100 INJECTION, SOLUTION INTRAVENOUS; SUBCUTANEOUS at 21:09

## 2024-05-25 NOTE — ASSESSMENT & PLAN NOTE
Lab Results   Component Value Date    EGFR 35 05/25/2024    EGFR 35 05/24/2024    EGFR 39 05/23/2024    CREATININE 1.39 (H) 05/25/2024    CREATININE 1.39 (H) 05/24/2024    CREATININE 1.27 05/23/2024   Patient with history of CKD 3; baseline creatinine is approximately 1.0  May be slightly elevated in the setting of fluid overload; initiated diuresis

## 2024-05-25 NOTE — ASSESSMENT & PLAN NOTE
Lab Results   Component Value Date    HGBA1C 6.5 (H) 11/26/2023     Recent Labs     05/24/24  1603 05/24/24 2032 05/25/24  0720 05/25/24  1155   POCGLU 114 156* 151* 206*   Hold oral metformin   Placed on insulin sliding scale

## 2024-05-25 NOTE — PLAN OF CARE
Problem: PAIN - ADULT  Goal: Verbalizes/displays adequate comfort level or baseline comfort level  Description: Interventions:  - Encourage patient to monitor pain and request assistance  - Assess pain using appropriate pain scale  - Administer analgesics based on type and severity of pain and evaluate response  - Implement non-pharmacological measures as appropriate and evaluate response  - Consider cultural and social influences on pain and pain management  - Notify physician/advanced practitioner if interventions unsuccessful or patient reports new pain  Outcome: Progressing     Problem: INFECTION - ADULT  Goal: Absence or prevention of progression during hospitalization  Description: INTERVENTIONS:  - Assess and monitor for signs and symptoms of infection  - Monitor lab/diagnostic results  - Monitor all insertion sites, i.e. indwelling lines, tubes, and drains  - Monitor endotracheal if appropriate and nasal secretions for changes in amount and color  - [place] appropriate cooling/warming therapies per order  - Administer medications as ordered  - Instruct and encourage patient and family to use good hand hygiene technique  - Identify and instruct in appropriate isolation precautions for identified infection/condition  Outcome: Progressing  Goal: Absence of fever/infection during neutropenic period  Description: INTERVENTIONS:  - Monitor WBC    Outcome: Progressing     Problem: SAFETY ADULT  Goal: Patient will remain free of falls  Description: INTERVENTIONS:  - Educate patient/family on patient safety including physical limitations  - Instruct patient to call for assistance with activity   - Consult OT/PT to assist with strengthening/mobility   - Keep Call bell within reach  - Keep bed low and locked with side rails adjusted as appropriate  - Keep care items and personal belongings within reach  - Initiate and maintain comfort rounds  - Make Fall Risk Sign visible to staff  - Offer Toileting every  Hours,  in advance of need  - Initiate/Maintain alarm  - Obtain necessary fall risk management equipment:   - Apply yellow socks and bracelet for high fall risk patients  - Consider moving patient to room near nurses station  Outcome: Progressing  Goal: Maintain or return to baseline ADL function  Description: INTERVENTIONS:  -  Assess patient's ability to carry out ADLs; assess patient's baseline for ADL function and identify physical deficits which impact ability to perform ADLs (bathing, care of mouth/teeth, toileting, grooming, dressing, etc.)  - Assess/evaluate cause of self-care deficits   - Assess range of motion  - Assess patient's mobility; develop plan if impaired  - Assess patient's need for assistive devices and provide as appropriate  - Encourage maximum independence but intervene and supervise when necessary  - Involve family in performance of ADLs  - Assess for home care needs following discharge   - Consider OT consult to assist with ADL evaluation and planning for discharge  - Provide patient education as appropriate  Outcome: Progressing  Goal: Maintains/Returns to pre admission functional level  Description: INTERVENTIONS:  - Perform AM-PAC 6 Click Basic Mobility/ Daily Activity assessment daily.  - Set and communicate daily mobility goal to care team and patient/family/caregiver.   - Collaborate with rehabilitation services on mobility goals if consulted  - Perform Range of Motion  times a day.  - Reposition patient every  hours.  - Dangle patient  times a day  - Stand patient  times a day  - Ambulate patient  times a day  - Out of bed to chair  times a day   - Out of bed for meals  times a day  - Out of bed for toileting  - Record patient progress and toleration of activity level   Outcome: Progressing     Problem: DISCHARGE PLANNING  Goal: Discharge to home or other facility with appropriate resources  Description: INTERVENTIONS:  - Identify barriers to discharge w/patient and caregiver  - Arrange for  needed discharge resources and transportation as appropriate  - Identify discharge learning needs (meds, wound care, etc.)  - Arrange for interpretive services to assist at discharge as needed  - Refer to Case Management Department for coordinating discharge planning if the patient needs post-hospital services based on physician/advanced practitioner order or complex needs related to functional status, cognitive ability, or social support system  Outcome: Progressing     Problem: Knowledge Deficit  Goal: Patient/family/caregiver demonstrates understanding of disease process, treatment plan, medications, and discharge instructions  Description: Complete learning assessment and assess knowledge base.  Interventions:  - Provide teaching at level of understanding  - Provide teaching via preferred learning methods  Outcome: Progressing     Problem: Prexisting or High Potential for Compromised Skin Integrity  Goal: Skin integrity is maintained or improved  Description: INTERVENTIONS:  - Identify patients at risk for skin breakdown  - Assess and monitor skin integrity  - Assess and monitor nutrition and hydration status  - Monitor labs   - Assess for incontinence   - Turn and reposition patient  - Assist with mobility/ambulation  - Relieve pressure over bony prominences  - Avoid friction and shearing  - Provide appropriate hygiene as needed including keeping skin clean and dry  - Evaluate need for skin moisturizer/barrier cream  - Collaborate with interdisciplinary team   - Patient/family teaching  - Consider wound care consult   Outcome: Progressing     Problem: CARDIOVASCULAR - ADULT  Goal: Maintains optimal cardiac output and hemodynamic stability  Description: INTERVENTIONS:  - Monitor I/O, vital signs and rhythm  - Monitor for S/S and trends of decreased cardiac output  - Administer and titrate ordered vasoactive medications to optimize hemodynamic stability  - Assess quality of pulses, skin color and temperature  -  Assess for signs of decreased coronary artery perfusion  - Instruct patient to report change in severity of symptoms  Outcome: Progressing  Goal: Absence of cardiac dysrhythmias or at baseline rhythm  Description: INTERVENTIONS:  - Continuous cardiac monitoring, vital signs, obtain 12 lead EKG if ordered  - Administer antiarrhythmic and heart rate control medications as ordered  - Monitor electrolytes and administer replacement therapy as ordered  Outcome: Progressing

## 2024-05-25 NOTE — PLAN OF CARE
Problem: PAIN - ADULT  Goal: Verbalizes/displays adequate comfort level or baseline comfort level  Description: Interventions:  - Encourage patient to monitor pain and request assistance  - Assess pain using appropriate pain scale  - Administer analgesics based on type and severity of pain and evaluate response  - Implement non-pharmacological measures as appropriate and evaluate response  - Consider cultural and social influences on pain and pain management  - Notify physician/advanced practitioner if interventions unsuccessful or patient reports new pain  Outcome: Progressing     Problem: INFECTION - ADULT  Goal: Absence or prevention of progression during hospitalization  Description: INTERVENTIONS:  - Assess and monitor for signs and symptoms of infection  - Monitor lab/diagnostic results  - Monitor all insertion sites, i.e. indwelling lines, tubes, and drains  - Monitor endotracheal if appropriate and nasal secretions for changes in amount and color  - [place] appropriate cooling/warming therapies per order  - Administer medications as ordered  - Instruct and encourage patient and family to use good hand hygiene technique  - Identify and instruct in appropriate isolation precautions for identified infection/condition  Outcome: Progressing  Goal: Absence of fever/infection during neutropenic period  Description: INTERVENTIONS:  - Monitor WBC    Outcome: Progressing     Problem: SAFETY ADULT  Goal: Patient will remain free of falls  Description: INTERVENTIONS:  - Educate patient/family on patient safety including physical limitations  - Instruct patient to call for assistance with activity   - Consult OT/PT to assist with strengthening/mobility   - Keep Call bell within reach  - Keep bed low and locked with side rails adjusted as appropriate  - Keep care items and personal belongings within reach  - Initiate and maintain comfort rounds  - Make Fall Risk Sign visible to staff  - Offer Toileting every 2 Hours,  in advance of need  - Initiate/Maintain bed alarm  - Obtain necessary fall risk management equipment: call bell  - Apply yellow socks and bracelet for high fall risk patients  - Consider moving patient to room near nurses station  Outcome: Progressing  Goal: Maintain or return to baseline ADL function  Description: INTERVENTIONS:  -  Assess patient's ability to carry out ADLs; assess patient's baseline for ADL function and identify physical deficits which impact ability to perform ADLs (bathing, care of mouth/teeth, toileting, grooming, dressing, etc.)  - Assess/evaluate cause of self-care deficits   - Assess range of motion  - Assess patient's mobility; develop plan if impaired  - Assess patient's need for assistive devices and provide as appropriate  - Encourage maximum independence but intervene and supervise when necessary  - Involve family in performance of ADLs  - Assess for home care needs following discharge   - Consider OT consult to assist with ADL evaluation and planning for discharge  - Provide patient education as appropriate  Outcome: Progressing  Goal: Maintains/Returns to pre admission functional level  Description: INTERVENTIONS:  - Perform AM-PAC 6 Click Basic Mobility/ Daily Activity assessment daily.  - Set and communicate daily mobility goal to care team and patient/family/caregiver.   - Collaborate with rehabilitation services on mobility goals if consulted  - Perform Range of Motion 3 times a day.  - Reposition patient every 2 hours.  - Dangle patient 3 times a day  - Stand patient 3 times a day  - Ambulate patient 3 times a day  - Out of bed to chair 3 times a day   - Out of bed for meals 3  Problem: DISCHARGE PLANNING  Goal: Discharge to home or other facility with appropriate resources  Description: INTERVENTIONS:  - Identify barriers to discharge w/patient and caregiver  - Arrange for needed discharge resources and transportation as appropriate  - Identify discharge learning needs (meds,  wound care, etc.)  - Arrange for interpretive services to assist at discharge as needed  - Refer to Case Management Department for coordinating discharge planning if the patient needs post-hospital services based on physician/advanced practitioner order or complex needs related to functional status, cognitive ability, or social support system  Outcome: Progressing     Problem: Knowledge Deficit  Goal: Patient/family/caregiver demonstrates understanding of disease process, treatment plan, medications, and discharge instructions  Description: Complete learning assessment and assess knowledge base.  Interventions:  - Provide teaching at level of understanding  - Provide teaching via preferred learning methods  Outcome: Progressing     Problem: Prexisting or High Potential for Compromised Skin Integrity  Goal: Skin integrity is maintained or improved  Description: INTERVENTIONS:  - Identify patients at risk for skin breakdown  - Assess and monitor skin integrity  - Assess and monitor nutrition and hydration status  - Monitor labs   - Assess for incontinence   - Turn and reposition patient  - Assist with mobility/ambulation  - Relieve pressure over bony prominences  - Avoid friction and shearing  - Provide appropriate hygiene as needed including keeping skin clean and dry  - Evaluate need for skin moisturizer/barrier cream  - Collaborate with interdisciplinary team   - Patient/family teaching  - Consider wound care consult   Outcome: Progressing     Problem: CARDIOVASCULAR - ADULT  Goal: Maintains optimal cardiac output and hemodynamic stability  Description: INTERVENTIONS:  - Monitor I/O, vital signs and rhythm  - Monitor for S/S and trends of decreased cardiac output  - Administer and titrate ordered vasoactive medications to optimize hemodynamic stability  - Assess quality of pulses, skin color and temperature  - Assess for signs of decreased coronary artery perfusion  - Instruct patient to report change in severity  of symptoms  Outcome: Progressing  Goal: Absence of cardiac dysrhythmias or at baseline rhythm  Description: INTERVENTIONS:  - Continuous cardiac monitoring, vital signs, obtain 12 lead EKG if ordered  - Administer antiarrhythmic and heart rate control medications as ordered  - Monitor electrolytes and administer replacement therapy as ordered  Outcome: Progressing    times a day  - Out of bed for toileting  - Record patient progress and toleration of activity level   Outcome: Progressing

## 2024-05-25 NOTE — PLAN OF CARE
Problem: PAIN - ADULT  Goal: Verbalizes/displays adequate comfort level or baseline comfort level  Description: Interventions:  - Encourage patient to monitor pain and request assistance  - Assess pain using appropriate pain scale  - Administer analgesics based on type and severity of pain and evaluate response  - Implement non-pharmacological measures as appropriate and evaluate response  - Consider cultural and social influences on pain and pain management  - Notify physician/advanced practitioner if interventions unsuccessful or patient reports new pain  Outcome: Progressing     Problem: INFECTION - ADULT  Goal: Absence or prevention of progression during hospitalization  Description: INTERVENTIONS:  - Assess and monitor for signs and symptoms of infection  - Monitor lab/diagnostic results  - Monitor all insertion sites, i.e. indwelling lines, tubes, and drains  - Monitor endotracheal if appropriate and nasal secretions for changes in amount and color  - [place] appropriate cooling/warming therapies per order  - Administer medications as ordered  - Instruct and encourage patient and family to use good hand hygiene technique  - Identify and instruct in appropriate isolation precautions for identified infection/condition  Outcome: Progressing  Goal: Absence of fever/infection during neutropenic period  Description: INTERVENTIONS:  - Monitor WBC    Outcome: Progressing     Problem: SAFETY ADULT  Goal: Patient will remain free of falls  Description: INTERVENTIONS:  - Educate patient/family on patient safety including physical limitations  - Instruct patient to call for assistance with activity   - Consult OT/PT to assist with strengthening/mobility   - Keep Call bell within reach  - Keep bed low and locked with side rails adjusted as appropriate  - Keep care items and personal belongings within reach  - Initiate and maintain comfort rounds  - Make Fall Risk Sign visible to staff  - Offer Toileting every 2 Hours,  in advance of need  - Initiate/Maintain bed alarm  - Obtain necessary fall risk management equipment: call bell  - Apply yellow socks and bracelet for high fall risk patients  - Consider moving patient to room near nurses station  Outcome: Progressing  Goal: Maintain or return to baseline ADL function  Description: INTERVENTIONS:  -  Assess patient's ability to carry out ADLs; assess patient's baseline for ADL function and identify physical deficits which impact ability to perform ADLs (bathing, care of mouth/teeth, toileting, grooming, dressing, etc.)  - Assess/evaluate cause of self-care deficits   - Assess range of motion  - Assess patient's mobility; develop plan if impaired  - Assess patient's need for assistive devices and provide as appropriate  - Encourage maximum independence but intervene and supervise when necessary  - Involve family in performance of ADLs  - Assess for home care needs following discharge   - Consider OT consult to assist with ADL evaluation and planning for discharge  - Provide patient education as appropriate  Outcome: Progressing  Goal: Maintains/Returns to pre admission functional level  Description: INTERVENTIONS:  - Perform AM-PAC 6 Click Basic Mobility/ Daily Activity assessment daily.  - Set and communicate daily mobility goal to care team and patient/family/caregiver.   - Collaborate with rehabilitation services on mobility goals if consulted  - Perform Range of Motion 3 times a day.  - Reposition patient every 2 hours.  - Dangle patient 3 times a day  - Stand patient 3 times a day  - Ambulate patient 3 times a day  - Out of bed to chair 3 times a day   - Out of bed for meals 3  Problem: DISCHARGE PLANNING  Goal: Discharge to home or other facility with appropriate resources  Description: INTERVENTIONS:  - Identify barriers to discharge w/patient and caregiver  - Arrange for needed discharge resources and transportation as appropriate  - Identify discharge learning needs (meds,  wound care, etc.)  - Arrange for interpretive services to assist at discharge as needed  - Refer to Case Management Department for coordinating discharge planning if the patient needs post-hospital services based on physician/advanced practitioner order or complex needs related to functional status, cognitive ability, or social support system  Outcome: Progressing     Problem: Knowledge Deficit  Goal: Patient/family/caregiver demonstrates understanding of disease process, treatment plan, medications, and discharge instructions  Description: Complete learning assessment and assess knowledge base.  Interventions:  - Provide teaching at level of understanding  - Provide teaching via preferred learning methods  Outcome: Progressing     Problem: Prexisting or High Potential for Compromised Skin Integrity  Goal: Skin integrity is maintained or improved  Description: INTERVENTIONS:  - Identify patients at risk for skin breakdown  - Assess and monitor skin integrity  - Assess and monitor nutrition and hydration status  - Monitor labs   - Assess for incontinence   - Turn and reposition patient  - Assist with mobility/ambulation  - Relieve pressure over bony prominences  - Avoid friction and shearing  - Provide appropriate hygiene as needed including keeping skin clean and dry  - Evaluate need for skin moisturizer/barrier cream  - Collaborate with interdisciplinary team   - Patient/family teaching  - Consider wound care consult   Outcome: Progressing     Problem: CARDIOVASCULAR - ADULT  Goal: Maintains optimal cardiac output and hemodynamic stability  Description: INTERVENTIONS:  - Monitor I/O, vital signs and rhythm  - Monitor for S/S and trends of decreased cardiac output  - Administer and titrate ordered vasoactive medications to optimize hemodynamic stability  - Assess quality of pulses, skin color and temperature  - Assess for signs of decreased coronary artery perfusion  - Instruct patient to report change in severity  of symptoms  Outcome: Progressing  Goal: Absence of cardiac dysrhythmias or at baseline rhythm  Description: INTERVENTIONS:  - Continuous cardiac monitoring, vital signs, obtain 12 lead EKG if ordered  - Administer antiarrhythmic and heart rate control medications as ordered  - Monitor electrolytes and administer replacement therapy as ordered  Outcome: Progressing    times a day  - Out of bed for toileting  - Record patient progress and toleration of activity level   Outcome: Progressing

## 2024-05-25 NOTE — ASSESSMENT & PLAN NOTE
Complaining of right lower leg pain and right calf  Noted wounds to bilateral shins and venous stasis changes  Obtain lower extremity venous duplex- without acute or chronic DVT   Wound care consulted  Vascular surgery consulted

## 2024-05-25 NOTE — ASSESSMENT & PLAN NOTE
Patient presents with complaints of leg swelling; noted to have 21 pound weight gain in the last 3 months  Has had similar problems in the past; denies shortness of breath or chest pain  Echo 2023: EF 70%, 1 abnormal relaxation  Home regimen Lasix 20 mg twice daily  Was to have echocardiogram done as an outpatient  Echocardiogram here similar to prior: EF 70%, grade 1 abnormal relaxation  BNP noted to be 302 on admission  Maintain fluid restricted and low-sodium diet  Compression stocking  S/p IV lasix 40 mg BID   Monitor I and O and daily standing weights- noted decreased in weights  Will transition to orals today

## 2024-05-25 NOTE — PROGRESS NOTES
[facility]  Progress Note  Name: Mattie Varela I  MRN: [id number]  Unit/Bed#: E4 -01 I Date of Admission: 5/22/2024   Date of Service: 5/25/2024 I Hospital Day: 3    Assessment & Plan   * Leg swelling  Assessment & Plan  Patient presents with complaints of leg swelling; noted to have 21 pound weight gain in the last 3 months  Has had similar problems in the past; denies shortness of breath or chest pain  Echo 2023: EF 70%, 1 abnormal relaxation  Home regimen Lasix 20 mg twice daily  Was to have echocardiogram done as an outpatient  Echocardiogram here similar to prior: EF 70%, grade 1 abnormal relaxation  BNP noted to be 302 on admission  Maintain fluid restricted and low-sodium diet  Compression stocking  S/p IV lasix 40 mg BID   Monitor I and O and daily standing weights- noted decreased in weights  Will transition to orals today     Leg pain, right  Assessment & Plan  Complaining of right lower leg pain and right calf  Noted wounds to bilateral shins and venous stasis changes  Obtain lower extremity venous duplex- without acute or chronic DVT   Wound care consulted  Vascular surgery consulted    Stage 3a chronic kidney disease (HCC)  Assessment & Plan  Lab Results   Component Value Date    EGFR 35 05/25/2024    EGFR 35 05/24/2024    EGFR 39 05/23/2024    CREATININE 1.39 (H) 05/25/2024    CREATININE 1.39 (H) 05/24/2024    CREATININE 1.27 05/23/2024   Patient with history of CKD 3; baseline creatinine is approximately 1.0  May be slightly elevated in the setting of fluid overload; initiated diuresis    Chronic obstructive pulmonary disease, unspecified COPD type ([facility])  Assessment & Plan  History of COPD; currently 94% SpO2 on room air  Denies symptoms of shortness of breath/dyspnea on exertion; not in acute exacerbation  Continue home therapies    Cerebrovascular accident (CVA), unspecified mechanism ([facility])  Assessment & Plan  Patient with history of CVA; continue aspirin, Plavix,  Lipitor  Denies stroke-like symptoms, monitor    Iron deficiency anemia secondary to inadequate dietary iron intake  Assessment & Plan  Hemoglobin stable 11.2; continue oral iron therapy    Hypertension  Assessment & Plan  Blood pressure well-controlled on admission  Home regimen amlodipine 5 mg daily, metoprolol succinate 50 mg twice daily   Held amlodipine in the setting of leg swelling  Continue metoprolol  BP stable    Type 2 diabetes mellitus with other skin complications (HCC)  Assessment & Plan  Lab Results   Component Value Date    HGBA1C 6.5 (H) 2023     Recent Labs     24  1603 24  20324  0720 24  1155   POCGLU 114 156* 151* 206*   Hold oral metformin   Placed on insulin sliding scale          VTE Pharmacologic Prophylaxis:   Pharmacologic: Heparin  Mechanical VTE Prophylaxis in Place: Yes    AM-PAC Basic Mobility:  Basic Mobility Inpatient Raw Score: 20  JH-HLM Achieved: 7: Walk 25 feet or more  JH-HLM Goal: 6: Walk 10 steps or more    Discharge Plan: with need for continued inpatient stay for ongoing workup    Discussions with Specialists or Other Care Team Provider: nursing    Education and Discussions with Family / Patient: patient    Time Spent for Care: This time was spent on one or more of the following: performing physical exam; counseling and coordination of care; obtaining or reviewing history; documenting in the medical record; reviewing/ordering tests, medications, or procedures; communicating with other healthcare professionals and discussing with patient's family/caregivers.    Current Length of Stay: 3 day(s)  Current Patient Status: Inpatient   Code Status: Level 1 - Full Code    Subjective:   Patient still complaining of leg pain - very tender to touch and painful at rest. Has had good urine output. Swelling improved and pt is down weight.     Objective:     Vitals:   Temp (24hrs), Av.6 °F (37 °C), Min:98.5 °F (36.9 °C), Max:98.6 °F (37 °C)    Temp:   [98.5 °F (36.9 °C)-98.6 °F (37 °C)] 98.5 °F (36.9 °C)  HR:  [69-85] 78  Resp:  [16-18] 18  BP: (142-176)/(71-87) 142/71  SpO2:  [92 %-95 %] 95 %  Body mass index is 32.55 kg/m².     Input and Output Summary (last 24 hours):       Intake/Output Summary (Last 24 hours) at 5/25/2024 1427  Last data filed at 5/25/2024 0717  Gross per 24 hour   Intake --   Output 1900 ml   Net -1900 ml       Physical Exam:     Physical Exam  Vitals and nursing note reviewed.   Constitutional:       General: She is not in acute distress.     Appearance: She is obese. She is not ill-appearing, toxic-appearing or diaphoretic.   HENT:      Head: Normocephalic and atraumatic.   Eyes:      General: No scleral icterus.  Cardiovascular:      Rate and Rhythm: Normal rate and regular rhythm.   Pulmonary:      Effort: Pulmonary effort is normal. No respiratory distress.      Breath sounds: Normal breath sounds. No stridor. No wheezing or rhonchi.   Abdominal:      General: Bowel sounds are normal. There is no distension.      Palpations: Abdomen is soft. There is no mass.      Tenderness: There is no abdominal tenderness.      Hernia: No hernia is present.   Musculoskeletal:         General: Swelling and tenderness present.      Cervical back: Neck supple.   Neurological:      Mental Status: She is alert and oriented to person, place, and time. Mental status is at baseline.   Psychiatric:         Mood and Affect: Mood normal.         Behavior: Behavior normal.         Additional Data:     Labs:    Results from last 7 days   Lab Units 05/23/24  0531   WBC Thousand/uL 4.98   HEMOGLOBIN g/dL 10.6*   HEMATOCRIT % 33.3*   PLATELETS Thousands/uL 258   SEGS PCT % 42*   LYMPHO PCT % 40   MONO PCT % 11   EOS PCT % 6     Results from last 7 days   Lab Units 05/25/24  0557 05/24/24  0529 05/23/24  0531   POTASSIUM mmol/L 4.2   < > 4.2   CHLORIDE mmol/L 96   < > 102   CO2 mmol/L 33*   < > 32   BUN mg/dL 31*   < > 26*   CREATININE mg/dL 1.39*   < > 1.27    CALCIUM mg/dL 9.6   < > 9.0   ALK PHOS U/L  --   --  103   ALT U/L  --   --  7   AST U/L  --   --  14    < > = values in this interval not displayed.     Results from last 7 days   Lab Units 05/22/24  1544   INR  0.94       * I Have Reviewed All Lab Data Listed Above.  * Additional Pertinent Lab Tests Reviewed: All Labs For Current Hospital Admission Reviewed    Imaging:    Imaging Reports Reviewed Today Include:   Imaging Personally Reviewed by Myself Includes:      Recent Cultures (last 7 days):           Lines/Drains:  Invasive Devices       Peripheral Intravenous Line  Duration             Peripheral IV 05/22/24 Dorsal (posterior);Left Forearm 2 days                    Last 24 Hours Medication List:   Current Facility-Administered Medications   Medication Dose Route Frequency Provider Last Rate    acetaminophen  650 mg Oral Q6H PRN Gee Loza MD      aspirin  81 mg Oral Daily Gee Loza MD      atorvastatin  40 mg Oral Daily Gee Loza MD      baclofen  10 mg Oral BID PRN Gee Loza MD      clopidogrel  75 mg Oral Daily Gee Loza MD      ferrous sulfate  325 mg Oral Daily With Breakfast Gee Loza MD      heparin (porcine)  5,000 Units Subcutaneous Q8H [facility] Gee Loza MD      HYDROmorphone  0.2 mg Intravenous Q6H PRN NANY Miller      insulin lispro  1-5 Units Subcutaneous TID AC Gee Loza MD      insulin lispro  1-5 Units Subcutaneous HS Gee Loza MD      latanoprost  1 drop Both Eyes HS Gee Loza MD      levothyroxine  25 mcg Oral Early Morning Gee Loza MD      magnesium Oxide  400 mg Oral BID Gee Loza MD      meclizine  25 mg Oral 4x Daily PRN Gee Loza MD      metoprolol succinate  50 mg Oral Q12H [facility] Gee Loza MD      Milnacipran HCl  1 tablet Oral Daily Gee Loza MD      ondansetron  4 mg Intravenous Q6H PRN Gee Loza MD      oxybutynin  10 mg Oral Daily Gee Loza MD      oxyCODONE-acetaminophen  2 tablet Oral Q6H PRN Gee  MD Dago      pantoprazole  40 mg Oral Daily Before Breakfast Gee Loza MD      senna-docusate sodium  2 tablet Oral BID Gee Loza MD          Today, Patient Was Seen By: Susannah Mcclain PA-C    ** Please Note: This note has been constructed using a voice recognition system. **

## 2024-05-26 PROBLEM — I50.33 ACUTE ON CHRONIC DIASTOLIC CONGESTIVE HEART FAILURE (HCC): Status: ACTIVE | Noted: 2024-05-22

## 2024-05-26 LAB
ALBUMIN SERPL BCP-MCNC: 3.8 G/DL (ref 3.5–5)
ALP SERPL-CCNC: 110 U/L (ref 34–104)
ALT SERPL W P-5'-P-CCNC: 8 U/L (ref 7–52)
ANION GAP SERPL CALCULATED.3IONS-SCNC: 8 MMOL/L (ref 4–13)
AST SERPL W P-5'-P-CCNC: 16 U/L (ref 13–39)
BILIRUB SERPL-MCNC: 0.41 MG/DL (ref 0.2–1)
BUN SERPL-MCNC: 25 MG/DL (ref 5–25)
CALCIUM SERPL-MCNC: 10 MG/DL (ref 8.4–10.2)
CHLORIDE SERPL-SCNC: 98 MMOL/L (ref 96–108)
CK SERPL-CCNC: 50 U/L (ref 26–192)
CO2 SERPL-SCNC: 32 MMOL/L (ref 21–32)
CREAT SERPL-MCNC: 1.17 MG/DL (ref 0.6–1.3)
ERYTHROCYTE [DISTWIDTH] IN BLOOD BY AUTOMATED COUNT: 13.6 % (ref 11.6–15.1)
GFR SERPL CREATININE-BSD FRML MDRD: 43 ML/MIN/1.73SQ M
GLUCOSE SERPL-MCNC: 137 MG/DL (ref 65–140)
GLUCOSE SERPL-MCNC: 154 MG/DL (ref 65–140)
GLUCOSE SERPL-MCNC: 156 MG/DL (ref 65–140)
GLUCOSE SERPL-MCNC: 177 MG/DL (ref 65–140)
GLUCOSE SERPL-MCNC: 199 MG/DL (ref 65–140)
HCT VFR BLD AUTO: 36.7 % (ref 34.8–46.1)
HGB BLD-MCNC: 11.7 G/DL (ref 11.5–15.4)
MAGNESIUM SERPL-MCNC: 2.1 MG/DL (ref 1.9–2.7)
MCH RBC QN AUTO: 28.5 PG (ref 26.8–34.3)
MCHC RBC AUTO-ENTMCNC: 31.9 G/DL (ref 31.4–37.4)
MCV RBC AUTO: 89 FL (ref 82–98)
PLATELET # BLD AUTO: 264 THOUSANDS/UL (ref 149–390)
PMV BLD AUTO: 10.3 FL (ref 8.9–12.7)
POTASSIUM SERPL-SCNC: 3.8 MMOL/L (ref 3.5–5.3)
PROT SERPL-MCNC: 7 G/DL (ref 6.4–8.4)
RBC # BLD AUTO: 4.11 MILLION/UL (ref 3.81–5.12)
SODIUM SERPL-SCNC: 138 MMOL/L (ref 135–147)
WBC # BLD AUTO: 6.55 THOUSAND/UL (ref 4.31–10.16)

## 2024-05-26 PROCEDURE — 99232 SBSQ HOSP IP/OBS MODERATE 35: CPT | Performed by: PHYSICIAN ASSISTANT

## 2024-05-26 PROCEDURE — 82948 REAGENT STRIP/BLOOD GLUCOSE: CPT

## 2024-05-26 PROCEDURE — 80053 COMPREHEN METABOLIC PANEL: CPT | Performed by: PHYSICIAN ASSISTANT

## 2024-05-26 PROCEDURE — 83735 ASSAY OF MAGNESIUM: CPT | Performed by: PHYSICIAN ASSISTANT

## 2024-05-26 PROCEDURE — 85027 COMPLETE CBC AUTOMATED: CPT | Performed by: PHYSICIAN ASSISTANT

## 2024-05-26 PROCEDURE — 99222 1ST HOSP IP/OBS MODERATE 55: CPT | Performed by: SURGERY

## 2024-05-26 PROCEDURE — 82550 ASSAY OF CK (CPK): CPT | Performed by: PHYSICIAN ASSISTANT

## 2024-05-26 RX ORDER — FUROSEMIDE 20 MG/1
20 TABLET ORAL ONCE
Status: COMPLETED | OUTPATIENT
Start: 2024-05-26 | End: 2024-05-26

## 2024-05-26 RX ORDER — LANOLIN ALCOHOL/MO/W.PET/CERES
9 CREAM (GRAM) TOPICAL
Status: DISCONTINUED | OUTPATIENT
Start: 2024-05-26 | End: 2024-05-28 | Stop reason: HOSPADM

## 2024-05-26 RX ORDER — FUROSEMIDE 40 MG/1
40 TABLET ORAL DAILY
Status: DISCONTINUED | OUTPATIENT
Start: 2024-05-27 | End: 2024-05-28 | Stop reason: HOSPADM

## 2024-05-26 RX ORDER — AMLODIPINE BESYLATE 5 MG/1
5 TABLET ORAL DAILY
Status: DISCONTINUED | OUTPATIENT
Start: 2024-05-26 | End: 2024-05-28 | Stop reason: HOSPADM

## 2024-05-26 RX ADMIN — MAGNESIUM OXIDE TAB 400 MG (241.3 MG ELEMENTAL MG) 400 MG: 400 (241.3 MG) TAB at 08:50

## 2024-05-26 RX ADMIN — BACLOFEN 10 MG: 10 TABLET ORAL at 21:27

## 2024-05-26 RX ADMIN — METOPROLOL SUCCINATE 50 MG: 50 TABLET, EXTENDED RELEASE ORAL at 21:15

## 2024-05-26 RX ADMIN — SENNOSIDES AND DOCUSATE SODIUM 2 TABLET: 8.6; 5 TABLET ORAL at 08:50

## 2024-05-26 RX ADMIN — SENNOSIDES AND DOCUSATE SODIUM 2 TABLET: 8.6; 5 TABLET ORAL at 21:15

## 2024-05-26 RX ADMIN — OXYCODONE HYDROCHLORIDE AND ACETAMINOPHEN 2 TABLET: 5; 325 TABLET ORAL at 03:47

## 2024-05-26 RX ADMIN — CLOPIDOGREL BISULFATE 75 MG: 75 TABLET ORAL at 08:50

## 2024-05-26 RX ADMIN — HEPARIN SODIUM 5000 UNITS: 5000 INJECTION INTRAVENOUS; SUBCUTANEOUS at 05:28

## 2024-05-26 RX ADMIN — HYDROMORPHONE HYDROCHLORIDE 0.2 MG: 0.2 INJECTION, SOLUTION INTRAMUSCULAR; INTRAVENOUS; SUBCUTANEOUS at 00:20

## 2024-05-26 RX ADMIN — ATORVASTATIN CALCIUM 40 MG: 40 TABLET, FILM COATED ORAL at 08:50

## 2024-05-26 RX ADMIN — LATANOPROST 1 DROP: 50 SOLUTION OPHTHALMIC at 21:16

## 2024-05-26 RX ADMIN — OXYBUTYNIN CHLORIDE 10 MG: 10 TABLET, EXTENDED RELEASE ORAL at 08:50

## 2024-05-26 RX ADMIN — ASPIRIN 81 MG CHEWABLE TABLET 81 MG: 81 TABLET CHEWABLE at 08:50

## 2024-05-26 RX ADMIN — INSULIN LISPRO 1 UNITS: 100 INJECTION, SOLUTION INTRAVENOUS; SUBCUTANEOUS at 22:11

## 2024-05-26 RX ADMIN — FUROSEMIDE 20 MG: 20 TABLET ORAL at 13:46

## 2024-05-26 RX ADMIN — PANTOPRAZOLE SODIUM 40 MG: 40 TABLET, DELAYED RELEASE ORAL at 06:00

## 2024-05-26 RX ADMIN — INSULIN LISPRO 1 UNITS: 100 INJECTION, SOLUTION INTRAVENOUS; SUBCUTANEOUS at 08:50

## 2024-05-26 RX ADMIN — OXYCODONE HYDROCHLORIDE AND ACETAMINOPHEN 2 TABLET: 5; 325 TABLET ORAL at 16:28

## 2024-05-26 RX ADMIN — FERROUS SULFATE TAB 325 MG (65 MG ELEMENTAL FE) 325 MG: 325 (65 FE) TAB at 08:52

## 2024-05-26 RX ADMIN — AMLODIPINE BESYLATE 5 MG: 5 TABLET ORAL at 13:46

## 2024-05-26 RX ADMIN — MELATONIN 9 MG: 3 TAB ORAL at 22:10

## 2024-05-26 RX ADMIN — INSULIN LISPRO 1 UNITS: 100 INJECTION, SOLUTION INTRAVENOUS; SUBCUTANEOUS at 13:52

## 2024-05-26 RX ADMIN — LEVOTHYROXINE SODIUM 25 MCG: 25 TABLET ORAL at 05:52

## 2024-05-26 RX ADMIN — HEPARIN SODIUM 5000 UNITS: 5000 INJECTION INTRAVENOUS; SUBCUTANEOUS at 21:15

## 2024-05-26 RX ADMIN — FUROSEMIDE 20 MG: 20 TABLET ORAL at 08:50

## 2024-05-26 RX ADMIN — METOPROLOL SUCCINATE 50 MG: 50 TABLET, EXTENDED RELEASE ORAL at 08:50

## 2024-05-26 RX ADMIN — MAGNESIUM OXIDE TAB 400 MG (241.3 MG ELEMENTAL MG) 400 MG: 400 (241.3 MG) TAB at 21:15

## 2024-05-26 RX ADMIN — HEPARIN SODIUM 5000 UNITS: 5000 INJECTION INTRAVENOUS; SUBCUTANEOUS at 13:50

## 2024-05-26 RX ADMIN — INSULIN LISPRO 1 UNITS: 100 INJECTION, SOLUTION INTRAVENOUS; SUBCUTANEOUS at 16:25

## 2024-05-26 NOTE — PROGRESS NOTES
[facility]  Progress Note  Name: Mattie Varela I  MRN: [id number]  Unit/Bed#: E4 -01 I Date of Admission: 5/22/2024   Date of Service: 5/26/2024 I Hospital Day: 4    Assessment & Plan   * Acute on chronic diastolic congestive heart failure (HCC)  Assessment & Plan  Patient presents with complaints of leg swelling; noted to have 21 pound weight gain in the last 3 months  Diuretics temporally held 2 weeks ago to see if this would help her urinary incontinence and has been having issues since  Echo 2023: EF 70%, grade 1 abnormal relaxation  Home regimen Lasix 20 mg daily  Was to have echocardiogram done as an outpatient  Echocardiogram performed here similar to prior: EF 70%, grade 1 abnormal relaxation  BNP noted to be 302 on admission  S/p IV lasix 40 mg BID - weight has overall decreased since admission  Transitioned to home lasix 20 mg daily but noted bump in weight since yesterday  Will increase to lasix 40 mg daily  Maintain fluid restriction, low-sodium diet, compression stocking, elevation    Leg pain, right  Assessment & Plan  Complaining of leg pain - right lower leg pain and right calf worse than left  Noted wounds to bilateral shins and venous stasis changes  Obtained lower extremity venous duplex- without acute or chronic DVT   Wound care consulted  Vascular surgery consulted- needs outpt followup with vascular for additional outpt workup and discussion of treatment options  Will need to see wound care in the office as well  Will need dressing changes performed daily -this will likely occur through [facility]    Stage 3a chronic kidney disease (HCC)  Assessment & Plan  Lab Results   Component Value Date    EGFR 43 05/26/2024    EGFR 35 05/25/2024    EGFR 35 05/24/2024    CREATININE 1.17 05/26/2024    CREATININE 1.39 (H) 05/25/2024    CREATININE 1.39 (H) 05/24/2024   Patient with history of CKD 3; baseline creatinine is approximately 1.0  Improved with IV  diuresis    Chronic obstructive pulmonary disease, unspecified COPD type ([facility])  Assessment & Plan  History of COPD; stable on room air  Denies symptoms of shortness of breath/dyspnea on exertion; not in acute exacerbation  Continue home inhalers    Cerebrovascular accident (CVA), unspecified mechanism ([facility])  Assessment & Plan  Patient with history of CVA  Continue aspirin, Plavix, Lipitor    Iron deficiency anemia secondary to inadequate dietary iron intake  Assessment & Plan  Hemoglobin stable in the 11s  Already on oral iron therapy    Hypertension  Assessment & Plan  Home regimen amlodipine 5 mg daily, metoprolol succinate 50 mg twice daily   Held amlodipine on admission to allow for diuresis   Will resume now that pressures are elevating    Type 2 diabetes mellitus with other skin complications ([facility])  Assessment & Plan  Lab Results   Component Value Date    HGBA1C 6.5 (H) 11/26/2023     Recent Labs     05/25/24  1614 05/25/24  2039 05/26/24  0713 05/26/24  1130   POCGLU 126 175* 156* 199*   Home regimen oral metformin - held here  Placed on insulin sliding scale        VTE Pharmacologic Prophylaxis:   Pharmacologic: Heparin  Mechanical VTE Prophylaxis in Place: Yes    AM-PAC Basic Mobility:  Basic Mobility Inpatient Raw Score: 20  JH-HLM Achieved: 7: Walk 25 feet or more  JH-HLM Goal: 6: Walk 10 steps or more    Discharge Plan: Stable for discharge-anticipating DC back to facility-[facility] with [facility] care on Tuesday 528    Discussions with Specialists or Other Care Team Provider: Nursing, case management    Education and Discussions with Family / Patient: Patient, daughter [place] updated via telephone    Time Spent for Care: This time was spent on one or more of the following: performing physical exam; counseling and coordination of care; obtaining or reviewing history; documenting in the medical record; reviewing/ordering tests, medications, or procedures; communicating with other healthcare professionals  and discussing with patient's family/caregivers.    Current Length of Stay: 4 day(s)  Current Patient Status: Inpatient   Code Status: Level 1 - Full Code    Subjective:   Patient in bathroom upon arrival.  With ongoing lower extremity pain.  Discussed venous stasis and need for outpatient follow-up with vascular to discuss further treatment/management.  Very concerned about being able to attend her urology appointment which is this upcoming Wednesday on 2024.  Updated daughter via telephone.    Objective:     Vitals:   Temp (24hrs), Av.7 °F (36.5 °C), Min:97.6 °F (36.4 °C), Max:97.8 °F (36.6 °C)    Temp:  [97.6 °F (36.4 °C)-97.8 °F (36.6 °C)] 97.6 °F (36.4 °C)  HR:  [66-76] 68  Resp:  [18] 18  BP: (127-166)/(60-70) 142/60  SpO2:  [94 %-98 %] 94 %  Body mass index is 33.07 kg/m².     Input and Output Summary (last 24 hours):       Intake/Output Summary (Last 24 hours) at 2024 1226  Last data filed at 2024 1157  Gross per 24 hour   Intake 360 ml   Output 2000 ml   Net -1640 ml       Physical Exam:     Physical Exam  Vitals and nursing note reviewed.   Constitutional:       General: She is not in acute distress.     Appearance: She is obese. She is not ill-appearing, toxic-appearing or diaphoretic.   HENT:      Head: Normocephalic and atraumatic.   Eyes:      General: No scleral icterus.  Pulmonary:      Effort: Pulmonary effort is normal.      Breath sounds: Normal breath sounds.   Abdominal:      General: Bowel sounds are normal. There is no distension.      Palpations: Abdomen is soft. There is no mass.      Tenderness: There is no abdominal tenderness.      Hernia: No hernia is present.   Musculoskeletal:      Cervical back: Neck supple.      Comments: Swelling much improved since admission - lower extremities ace wrapped   Skin:     General: Skin is warm and dry.   Neurological:      Mental Status: She is alert and oriented to person, place, and time. Mental status is at baseline.    Psychiatric:         Mood and Affect: Mood normal.         Behavior: Behavior normal.         Additional Data:     Labs:    Results from last 7 days   Lab Units 05/26/24  0547 05/23/24  0531   WBC Thousand/uL 6.55 4.98   HEMOGLOBIN g/dL 11.7 10.6*   HEMATOCRIT % 36.7 33.3*   PLATELETS Thousands/uL 264 258   SEGS PCT %  --  42*   LYMPHO PCT %  --  40   MONO PCT %  --  11   EOS PCT %  --  6     Results from last 7 days   Lab Units 05/26/24  0547   POTASSIUM mmol/L 3.8   CHLORIDE mmol/L 98   CO2 mmol/L 32   BUN mg/dL 25   CREATININE mg/dL 1.17   CALCIUM mg/dL 10.0   ALK PHOS U/L 110*   ALT U/L 8   AST U/L 16     Results from last 7 days   Lab Units 05/22/24  1544   INR  0.94       * I Have Reviewed All Lab Data Listed Above.  * Additional Pertinent Lab Tests Reviewed: All Labs For Current Hospital Admission Reviewed    Imaging:    Imaging Reports Reviewed Today Include:   Imaging Personally Reviewed by Myself Includes:      Recent Cultures (last 7 days):           Lines/Drains:  Invasive Devices       Peripheral Intravenous Line  Duration             Peripheral IV 05/26/24 Left;Ventral (anterior) Forearm <1 day                    Last 24 Hours Medication List:   Current Facility-Administered Medications   Medication Dose Route Frequency Provider Last Rate    acetaminophen  650 mg Oral Q6H PRN Gee Loza MD      amLODIPine  5 mg Oral Daily Susannah Mcclain PA-C      aspirin  81 mg Oral Daily Gee Loza MD      atorvastatin  40 mg Oral Daily Gee Loza MD      baclofen  10 mg Oral BID PRN Gee Loza MD      clopidogrel  75 mg Oral Daily Gee Loza MD      ferrous sulfate  325 mg Oral Daily With Breakfast Gee oLza MD      furosemide  20 mg Oral Once Susannah Mcclain PA-C      [START ON 5/27/2024] furosemide  40 mg Oral Daily Susannah Mcclain PA-C      heparin (porcine)  5,000 Units Subcutaneous Q8H [facility] Gee Loza MD      HYDROmorphone  0.2 mg Intravenous Q6H PRN NANY Miller      insulin  lispro  1-5 Units Subcutaneous TID AC Gee Loza MD      insulin lispro  1-5 Units Subcutaneous HS Gee Loza MD      latanoprost  1 drop Both Eyes HS Gee Loza MD      levothyroxine  25 mcg Oral Early Morning Gee Loza MD      magnesium Oxide  400 mg Oral BID Gee Loza MD      meclizine  25 mg Oral 4x Daily PRN Gee Loza MD      metoprolol succinate  50 mg Oral Q12H [facility] Gee Loza MD      Milnacipran HCl  1 tablet Oral Daily Gee Loza MD      ondansetron  4 mg Intravenous Q6H PRN Gee Loza MD      oxybutynin  10 mg Oral Daily Gee Loza MD      oxyCODONE-acetaminophen  2 tablet Oral Q6H PRN Gee Loza MD      pantoprazole  40 mg Oral Daily Before Breakfast Gee Loza MD      senna-docusate sodium  2 tablet Oral BID Gee Loza MD          Today, Patient Was Seen By: Susannah Mcclain PA-C    ** Please Note: This note has been constructed using a voice recognition system. **

## 2024-05-26 NOTE — CONSULTS
Consult Note - Vascular Surgery   Mattie Varela 79 y.o. female MRN: [id number]    Unit/Bed#: E4 -01 Encounter: [id number]    Assessment:  78 y/o F w/ Bilateral leg swelling and chronic bilateral pretibial venous ulcerations. Legs are sensitive to touch below the knee. LEVD 5/24 is negative for DVT and is not suggestive of arterial disease w/ triphasic/biphasic doppler wave forms. On exam B/L femoral, popliteal and DP pulses are palpable.    Plan:  Chronic bilateral leg edema w/ chronic venous stasis and pretibial ulcerations. No evidence of acute or chronic DVT by duplex no evidence for significant PAD that would warrant intervention.  Leg Elevation  Compression w/ ACE wrap and on discharge needs compression stockings knee high 20-30 mmHG.  Continue local wound care w/ wound center.  [facility]patient [facility]up [facility] for possible reflux study and consideration for compression pumps.     Consulting Service: [facility]    Chief Complaint: Bilateral lower extremity edema with pretibial ulcerations and pain    HPI: Mattie Varela is a 79 y.o. female with past medical history of CVA, DM, anemia, HTN, CKD, CAD, COPD, IBS, HLD, GERD, gastric ulcer, gastroparesis who presents with several weeks of increased leg swelling and pain with drainage from ulcerations on the shins.  On admission she was noted to have 21 pound weight gain since prior weights documented 3 months ago.  Lower extremity venous duplex was obtained and was negative for chronic or acute DVT.  On that study bilateral arterial waveforms were triphasic/biphasic with normal response to augmentation maneuvers.  On examination bilateral femoral, popliteal and DP pulses are palpable 2+.  Motor and sensation is intact bilaterally.  The legs are edematous and skin of the entire lower extremity is sensitive to light touch.  She denies symptoms that are consistent with rest pain or claudication.  She has been seen by wound care.    Review of  Systems:  General: negative  Cardiovascular: no chest pain or dyspnea on exertion  Respiratory: no cough, shortness of breath, or wheezing  Gastrointestinal: no abdominal pain, change in bowel habits, or black or bloody stools  Genitourinary ROS: no dysuria, trouble voiding, or hematuria  Musculoskeletal ROS: positive for - swelling in leg - bilateral  negative for - gait disturbance, joint pain, joint stiffness, joint swelling, muscle pain, or muscular weakness  Neurological ROS: no TIA or stroke symptoms  Hematological and Lymphatic ROS: negative  Dermatological ROS: positive for skin lesion changes  negative for acne, dry skin, eczema, hair changes, lumps, mole changes, nail changes, pruritus, and rash  Psychological ROS: negative  Ophthalmic ROS: negative  ENT ROS: negative    Past Medical History:  Past Medical History:   Diagnosis Date    Acid reflux     Acute kidney injury (HCC)     Anemia     hx of iron-deficient    Anxiety     Arthritis     Asthma     last needed inhaler last year 2020    Basal cell carcinoma     upper lip    Chronic narcotic dependence (HCC)     Chronic pain     Colon polyp     Cystocele     Diabetes mellitus (HCC)     stable    Disease of thyroid gland     hypothyroidism    Diverticulosis     Dizziness     at times    Dysfunctional uterine bleeding     last assessed - [id number]    Dysphagia     Fibromyalgia     Gastric ulcer     Gastroparesis     History of colonic polyps     last assessed - [id number]    History of gastroesophageal reflux (GERD)     Hypercholesterolemia     Hyperlipidemia     Hypertension     Hyponatremia     IBS (irritable bowel syndrome)     Pneumobilia 06/18/2022    Post laminectomy syndrome     S/P insertion of spinal cord stimulator 07/18/2018    s/p [employer] loop recorder 3/2/2024 03/02/2024    Seasonal allergies     Shortness of breath     exertional    Spinal stenosis     Status post lumbar spinal fusion 03/16/2018    Stroke (HCC)     pt states slight stroke  March 2022       Past Surgical History:  Past Surgical History:   Procedure Laterality Date    APPENDECTOMY      BACK SURGERY      BREAST CYST EXCISION Left     CARDIAC CATHETERIZATION  11/14/2023    Procedure: Cardiac catheterization;  Surgeon: Randolph Holt MD;  [facility];  Service: Cardiology    CARDIAC CATHETERIZATION N/A 11/14/2023    Procedure: Cardiac Coronary Angiogram;  Surgeon: Randolph Holt MD;  [facility];  Service: Cardiology    CARDIAC ELECTROPHYSIOLOGY PROCEDURE N/A 3/2/2024    Procedure: Cardiac loop recorder implant;  Surgeon: Edu Fontaine MD;  [facility];  Service: Cardiology    CHOLECYSTECTOMY      COLONOSCOPY      ESOPHAGOGASTRODUODENOSCOPY N/A 09/28/2016    Procedure: ESOPHAGOGASTRODUODENOSCOPY (EGD);  Surgeon: Mylene Moeller MD;  Location: [facility];  Service:     HERNIA REPAIR      HYSTERECTOMY      TTAH-BSO age 28    LAMINECTOMY      LUMBAR LAMINECTOMY      OOPHORECTOMY      age 28    SD ARTHRODESIS POSTERIOR/PSTLAT TQ 1NTRSPC THORACIC N/A 06/04/2018    Procedure: Reopening of lumbar incision for T12-L5 posterior instrumented fixation and fusion and T12-L4 posterior decompression;  Surgeon: Wood Rogel MD;  Location: [facility];  Service: Neurosurgery    SD COLONOSCOPY FLX DX W/COLLJ SPEC WHEN PFRMD N/A 03/02/2016    Procedure: EGD AND COLONOSCOPY;  Surgeon: Mylene Moeller MD;  [facility]: [facility];  Service: Gastroenterology    SD DILATION ESOPH UNGUIDED SOUND/BOUGIE 1/MULT PASS N/A 09/28/2016    Procedure: DILATATION ESOPHAGEAL;  Surgeon: Mylene Moeller MD;  [facility]: [facility];  Service: Gastroenterology    SD ESOPHAGOGASTRODUODENOSCOPY TRANSORAL DIAGNOSTIC N/A 07/18/2016    Procedure: ESOPHAGOGASTRODUODENOSCOPY (EGD);  Surgeon: Mylene Moeller MD;  [facility]: [facility];  Service: Gastroenterology    SD INSJ/RPLCMT SPINAL NPG/RCVR POCKET CRTJ&CONNJ Left 06/04/2018    Procedure: removal of left buttock implantable pulse  generator and placement of new  implantable pulse generator;  Surgeon: Wood Rogel MD;  Location: [facility];  Service: Neurosurgery       Social History:  Social History     Substance and Sexual Activity   Alcohol Use Not Currently    Comment: Denied history of alcohol use     Social History     Substance and Sexual Activity   Drug Use No    Comment: Denied history of drug use     Social History     Tobacco Use   Smoking Status Former    Current packs/day: 0.00    Types: Cigarettes    Quit date: 1970    Years since quittin.4   Smokeless Tobacco Never   Tobacco Comments    Denied history of current ever day smoker, Former smoker and Never smoker all documented in [employer]       Family History:  Family History   Problem Relation Age of Onset    Lung cancer Mother 46    Pulmonary embolism Father     No Known Problems Sister     No Known Problems Daughter     No Known Problems Daughter     Stroke Maternal Grandmother     Heart attack Maternal Grandfather     No Known Problems Paternal Grandmother     No Known Problems Paternal Grandfather     No Known Problems Maternal Aunt     Diabetes Family         Diabetes mellitus    Hypertension Family     Stroke Family         Stroke complications       Allergies:  Allergies   Allergen Reactions    Penicillins Anaphylaxis, Hives and Other (See Comments)     Other reaction(s): Unknown Reaction    Sulfa Antibiotics Anaphylaxis and Other (See Comments)     Other reaction(s): Unknown Reaction    Aspartame - Food Allergy Other (See Comments) and Hypertension     Slurred speech, weakness, stroke sx    Iodinated Contrast Media Hives     Pt has taken prep prior for contrast and has not had any break through reaction    Keflex [Cephalexin] Hives       Medications:  Current Facility-Administered Medications   Medication Dose Route Frequency    acetaminophen (TYLENOL) tablet 650 mg  650 mg Oral Q6H PRN    aspirin chewable tablet 81 mg  81 mg Oral Daily    atorvastatin (LIPITOR)  tablet 40 mg  40 mg Oral Daily    baclofen tablet 10 mg  10 mg Oral BID PRN    clopidogrel (PLAVIX) tablet 75 mg  75 mg Oral Daily    ferrous sulfate tablet 325 mg  325 mg Oral Daily With Breakfast    furosemide (LASIX) tablet 20 mg  20 mg Oral Daily    heparin (porcine) subcutaneous injection 5,000 Units  5,000 Units Subcutaneous Q8H ARY    HYDROmorphone HCl (DILAUDID) injection 0.2 mg  0.2 mg Intravenous Q6H PRN    insulin lispro (HumALOG/ADMELOG) 100 units/mL subcutaneous injection 1-5 Units  1-5 Units Subcutaneous TID AC    insulin lispro (HumALOG/ADMELOG) 100 units/mL subcutaneous injection 1-5 Units  1-5 Units Subcutaneous HS    latanoprost (XALATAN) 0.005 % ophthalmic solution 1 drop  1 drop Both Eyes HS    levothyroxine tablet 25 mcg  25 mcg Oral Early Morning    magnesium Oxide (MAG-OX) tablet 400 mg  400 mg Oral BID    meclizine (ANTIVERT) tablet 25 mg  25 mg Oral 4x Daily PRN    metoprolol succinate (TOPROL-XL) 24 hr tablet 50 mg  50 mg Oral Q12H ARY    Milnacipran HCl TABS 100 mg  1 tablet Oral Daily    ondansetron (ZOFRAN) injection 4 mg  4 mg Intravenous Q6H PRN    oxybutynin (DITROPAN-XL) 24 hr tablet 10 mg  10 mg Oral Daily    oxyCODONE-acetaminophen (PERCOCET) 5-325 mg per tablet 2 tablet  2 tablet Oral Q6H PRN    pantoprazole (PROTONIX) EC tablet 40 mg  40 mg Oral Daily Before Breakfast    senna-docusate sodium (SENOKOT S) 8.6-50 mg per tablet 2 tablet  2 tablet Oral BID       Vitals:  /60 (BP Location: Right arm)   Pulse 68   Temp 97.6 °F (36.4 °C) (Temporal)   Resp 18   Ht 5' (1.524 m)   Wt 76.8 kg (169 lb 5 oz)   LMP  (LMP Unknown)   SpO2 94%   BMI 33.07 kg/m²     I/Os:  I/O last 24 hours:  In: 360 [P.O.:360]  Out: 1700 [Urine:1700]    Lab Results and Cultures:   Lab Results   Component Value Date    WBC 6.55 05/26/2024    HGB 11.7 05/26/2024    HCT 36.7 05/26/2024    MCV 89 05/26/2024     05/26/2024     Lab Results   Component Value Date    GLUCOSE 193 (H) 06/04/2018     "CALCIUM 10.0 05/26/2024     04/30/2015    K 3.8 05/26/2024    CO2 32 05/26/2024    CL 98 05/26/2024    BUN 25 05/26/2024    CREATININE 1.17 05/26/2024     Lab Results   Component Value Date    INR 0.94 05/22/2024    INR 0.88 01/13/2024    INR 0.96 11/26/2023    PROTIME 12.8 05/22/2024    PROTIME 12.5 01/13/2024    PROTIME 12.7 11/26/2023       Lipid Panel:   Lab Results   Component Value Date    CHOL 165 04/30/2015   ,     Blood Culture:   Lab Results   Component Value Date    BLOODCX No Growth After 5 Days. 01/13/2024   ,   Urinalysis:   Lab Results   Component Value Date    COLORU yellow 05/08/2024    COLORU Dark Orange 01/13/2024    CLARITYU clear 05/08/2024    CLARITYU Clear 01/13/2024    SPECGRAV 1.015 01/13/2024    PHUR 5.0 01/13/2024    PHUR 5.5 05/10/2021    LEUKOCYTESUR negative 05/08/2024    LEUKOCYTESUR Negative 01/13/2024    NITRITE negative 05/08/2024    NITRITE Negative 01/13/2024    GLUCOSEU negative 05/08/2024    GLUCOSEU Negative 01/13/2024    KETONESU negative 05/08/2024    KETONESU Negative 01/13/2024    BILIRUBINUR negative 05/08/2024    BILIRUBINUR Negative 01/13/2024    BLOODU negative 05/08/2024    BLOODU Negative 01/13/2024   ,   Urine Culture:   Lab Results   Component Value Date    URINECX 40,000-49,000 cfu/ml Enterococcus faecalis (A) 01/10/2024    URINECX 20,000-29,000 cfu/ml 01/10/2024   ,   Wound Culure: No results found for: \"WOUNDCULT\"    Imaging:  LEVD as noted    Physical Exam:    General appearance: alert and oriented, in no acute distress  Skin: Skin color, texture, turgor normal. No rashes or lesions or Clean pink pretibial ulcerations with minimal serous drainage  Neurologic: Grossly normal  Head: Normocephalic, without obvious abnormality, atraumatic  Eyes: conjunctivae/corneas clear. PERRL, EOM's intact. Fundi benign.  Throat: lips, mucosa, and tongue normal; teeth and gums normal  Neck: no adenopathy, no carotid bruit, no JVD, supple, symmetrical, trachea midline, and " thyroid not enlarged, symmetric, no tenderness/mass/nodules  Back: symmetric, no curvature. ROM normal. No CVA tenderness.  Lungs: clear to auscultation bilaterally  Chest wall: no tenderness  Heart: regular rate and rhythm, S1, S2 normal, no murmur, click, rub or gallop  Abdomen: soft, non-tender; bowel sounds normal; no masses,  no organomegaly  Extremities: extremities +1 edema, warm and well-perfused, + hair growth of lower legs, M/S intact; no cyanosis, clubbing      Pulse exam:  Femoral: Right: 2+ Left: 2+  Popliteal: Right: 2+ Left: 2+  DP: Right: 2+ Left: 2+  PT: Right: doppler signal Left: doppler signal biphasic      Caitie Burk PA-C  5/26/2024

## 2024-05-26 NOTE — ASSESSMENT & PLAN NOTE
Lab Results   Component Value Date    HGBA1C 6.5 (H) 11/26/2023     Recent Labs     05/25/24  1614 05/25/24 2039 05/26/24  0713 05/26/24  1130   POCGLU 126 175* 156* 199*   Home regimen oral metformin - held here  Placed on insulin sliding scale

## 2024-05-26 NOTE — ASSESSMENT & PLAN NOTE
History of COPD; stable on room air  Denies symptoms of shortness of breath/dyspnea on exertion; not in acute exacerbation  Continue home inhalers

## 2024-05-26 NOTE — ASSESSMENT & PLAN NOTE
Home regimen amlodipine 5 mg daily, metoprolol succinate 50 mg twice daily   Held amlodipine on admission to allow for diuresis   Will resume now that pressures are elevating

## 2024-05-26 NOTE — ASSESSMENT & PLAN NOTE
Complaining of leg pain - right lower leg pain and right calf worse than left  Noted wounds to bilateral shins and venous stasis changes  Obtained lower extremity venous duplex- without acute or chronic DVT   Wound care consulted  Vascular surgery consulted- needs outpt followup with vascular for additional outpt workup and discussion of treatment options  Will need to see wound care in the office as well  Will need dressing changes performed daily -this will likely occur through [facility]

## 2024-05-26 NOTE — ASSESSMENT & PLAN NOTE
Lab Results   Component Value Date    EGFR 43 05/26/2024    EGFR 35 05/25/2024    EGFR 35 05/24/2024    CREATININE 1.17 05/26/2024    CREATININE 1.39 (H) 05/25/2024    CREATININE 1.39 (H) 05/24/2024   Patient with history of CKD 3; baseline creatinine is approximately 1.0  Improved with IV diuresis

## 2024-05-26 NOTE — ASSESSMENT & PLAN NOTE
Patient presents with complaints of leg swelling; noted to have 21 pound weight gain in the last 3 months  Diuretics temporally held 2 weeks ago to see if this would help her urinary incontinence and has been having issues since  Echo 2023: EF 70%, grade 1 abnormal relaxation  Home regimen Lasix 20 mg daily  Was to have echocardiogram done as an outpatient  Echocardiogram performed here similar to prior: EF 70%, grade 1 abnormal relaxation  BNP noted to be 302 on admission  S/p IV lasix 40 mg BID - weight has overall decreased since admission  Transitioned to home lasix 20 mg daily but noted bump in weight since yesterday  Will increase to lasix 40 mg daily  Maintain fluid restriction, low-sodium diet, compression stocking, elevation

## 2024-05-26 NOTE — PLAN OF CARE
Problem: PAIN - ADULT  Goal: Verbalizes/displays adequate comfort level or baseline comfort level  Description: Interventions:  - Encourage patient to monitor pain and request assistance  - Assess pain using appropriate pain scale  - Administer analgesics based on type and severity of pain and evaluate response  - Implement non-pharmacological measures as appropriate and evaluate response  - Consider cultural and social influences on pain and pain management  - Notify physician/advanced practitioner if interventions unsuccessful or patient reports new pain  Outcome: Progressing     Problem: INFECTION - ADULT  Goal: Absence or prevention of progression during hospitalization  Description: INTERVENTIONS:  - Assess and monitor for signs and symptoms of infection  - Monitor lab/diagnostic results  - Monitor all insertion sites, i.e. indwelling lines, tubes, and drains  - Monitor endotracheal if appropriate and nasal secretions for changes in amount and color  - [place] appropriate cooling/warming therapies per order  - Administer medications as ordered  - Instruct and encourage patient and family to use good hand hygiene technique  - Identify and instruct in appropriate isolation precautions for identified infection/condition  Outcome: Progressing  Goal: Absence of fever/infection during neutropenic period  Description: INTERVENTIONS:  - Monitor WBC    Outcome: Progressing     Problem: SAFETY ADULT  Goal: Patient will remain free of falls  Description: INTERVENTIONS:  - Educate patient/family on patient safety including physical limitations  - Instruct patient to call for assistance with activity   - Consult OT/PT to assist with strengthening/mobility   - Keep Call bell within reach  - Keep bed low and locked with side rails adjusted as appropriate  - Keep care items and personal belongings within reach  - Initiate and maintain comfort rounds  - Make Fall Risk Sign visible to staff  - Offer Toileting every 2 Hours,  in advance of need  - Initiate/Maintain bed alarm  - Obtain necessary fall risk management equipment: alarm   - Apply yellow socks and bracelet for high fall risk patients  - Consider moving patient to room near nurses station  Outcome: Progressing  Goal: Maintain or return to baseline ADL function  Description: INTERVENTIONS:  -  Assess patient's ability to carry out ADLs; assess patient's baseline for ADL function and identify physical deficits which impact ability to perform ADLs (bathing, care of mouth/teeth, toileting, grooming, dressing, etc.)  - Assess/evaluate cause of self-care deficits   - Assess range of motion  - Assess patient's mobility; develop plan if impaired  - Assess patient's need for assistive devices and provide as appropriate  - Encourage maximum independence but intervene and supervise when necessary  - Involve family in performance of ADLs  - Assess for home care needs following discharge   - Consider OT consult to assist with ADL evaluation and planning for discharge  - Provide patient education as appropriate  Outcome: Progressing  Goal: Maintains/Returns to pre admission functional level  Description: INTERVENTIONS:  - Perform AM-PAC 6 Click Basic Mobility/ Daily Activity assessment daily.  - Set and communicate daily mobility goal to care team and patient/family/caregiver.   - Collaborate with rehabilitation services on mobility goals if consulted  - Perform Range of Motion 3 times a day.  - Reposition patient every 2 hours.  - Dangle patient 3 times a day  - Stand patient 3 times a day  - Ambulate patient 3 times a day  - Out of bed to chair 3 times a day   - Out of bed for meals 3 times a day  - Out of bed for toileting  - Record patient progress and toleration of activity level   Outcome: Progressing     Problem: DISCHARGE PLANNING  Goal: Discharge to home or other facility with appropriate resources  Description: INTERVENTIONS:  - Identify barriers to discharge w/patient and  caregiver  - Arrange for needed discharge resources and transportation as appropriate  - Identify discharge learning needs (meds, wound care, etc.)  - Arrange for interpretive services to assist at discharge as needed  - Refer to Case Management Department for coordinating discharge planning if the patient needs post-hospital services based on physician/advanced practitioner order or complex needs related to functional status, cognitive ability, or social support system  Outcome: Progressing     Problem: Knowledge Deficit  Goal: Patient/family/caregiver demonstrates understanding of disease process, treatment plan, medications, and discharge instructions  Description: Complete learning assessment and assess knowledge base.  Interventions:  - Provide teaching at level of understanding  - Provide teaching via preferred learning methods  Outcome: Progressing     Problem: Prexisting or High Potential for Compromised Skin Integrity  Goal: Skin integrity is maintained or improved  Description: INTERVENTIONS:  - Identify patients at risk for skin breakdown  - Assess and monitor skin integrity  - Assess and monitor nutrition and hydration status  - Monitor labs   - Assess for incontinence   - Turn and reposition patient  - Assist with mobility/ambulation  - Relieve pressure over bony prominences  - Avoid friction and shearing  - Provide appropriate hygiene as needed including keeping skin clean and dry  - Evaluate need for skin moisturizer/barrier cream  - Collaborate with interdisciplinary team   - Patient/family teaching  - Consider wound care consult   Outcome: Progressing     Problem: CARDIOVASCULAR - ADULT  Goal: Maintains optimal cardiac output and hemodynamic stability  Description: INTERVENTIONS:  - Monitor I/O, vital signs and rhythm  - Monitor for S/S and trends of decreased cardiac output  - Administer and titrate ordered vasoactive medications to optimize hemodynamic stability  - Assess quality of pulses, skin  color and temperature  - Assess for signs of decreased coronary artery perfusion  - Instruct patient to report change in severity of symptoms  Outcome: Progressing  Goal: Absence of cardiac dysrhythmias or at baseline rhythm  Description: INTERVENTIONS:  - Continuous cardiac monitoring, vital signs, obtain 12 lead EKG if ordered  - Administer antiarrhythmic and heart rate control medications as ordered  - Monitor electrolytes and administer replacement therapy as ordered  Outcome: Progressing

## 2024-05-27 LAB
GLUCOSE SERPL-MCNC: 146 MG/DL (ref 65–140)
GLUCOSE SERPL-MCNC: 147 MG/DL (ref 65–140)
GLUCOSE SERPL-MCNC: 152 MG/DL (ref 65–140)
GLUCOSE SERPL-MCNC: 170 MG/DL (ref 65–140)

## 2024-05-27 PROCEDURE — 99232 SBSQ HOSP IP/OBS MODERATE 35: CPT | Performed by: PHYSICIAN ASSISTANT

## 2024-05-27 PROCEDURE — 82948 REAGENT STRIP/BLOOD GLUCOSE: CPT

## 2024-05-27 RX ADMIN — OXYCODONE HYDROCHLORIDE AND ACETAMINOPHEN 2 TABLET: 5; 325 TABLET ORAL at 01:38

## 2024-05-27 RX ADMIN — FERROUS SULFATE TAB 325 MG (65 MG ELEMENTAL FE) 325 MG: 325 (65 FE) TAB at 08:25

## 2024-05-27 RX ADMIN — FUROSEMIDE 40 MG: 40 TABLET ORAL at 08:25

## 2024-05-27 RX ADMIN — LATANOPROST 1 DROP: 50 SOLUTION OPHTHALMIC at 21:31

## 2024-05-27 RX ADMIN — OXYBUTYNIN CHLORIDE 10 MG: 10 TABLET, EXTENDED RELEASE ORAL at 08:25

## 2024-05-27 RX ADMIN — ASPIRIN 81 MG CHEWABLE TABLET 81 MG: 81 TABLET CHEWABLE at 08:25

## 2024-05-27 RX ADMIN — SENNOSIDES AND DOCUSATE SODIUM 2 TABLET: 8.6; 5 TABLET ORAL at 21:30

## 2024-05-27 RX ADMIN — MELATONIN 9 MG: 3 TAB ORAL at 21:30

## 2024-05-27 RX ADMIN — LEVOTHYROXINE SODIUM 25 MCG: 25 TABLET ORAL at 06:03

## 2024-05-27 RX ADMIN — SENNOSIDES AND DOCUSATE SODIUM 2 TABLET: 8.6; 5 TABLET ORAL at 08:25

## 2024-05-27 RX ADMIN — PANTOPRAZOLE SODIUM 40 MG: 40 TABLET, DELAYED RELEASE ORAL at 06:03

## 2024-05-27 RX ADMIN — AMLODIPINE BESYLATE 5 MG: 5 TABLET ORAL at 08:28

## 2024-05-27 RX ADMIN — MAGNESIUM OXIDE TAB 400 MG (241.3 MG ELEMENTAL MG) 400 MG: 400 (241.3 MG) TAB at 21:31

## 2024-05-27 RX ADMIN — METOPROLOL SUCCINATE 50 MG: 50 TABLET, EXTENDED RELEASE ORAL at 08:25

## 2024-05-27 RX ADMIN — INSULIN LISPRO 1 UNITS: 100 INJECTION, SOLUTION INTRAVENOUS; SUBCUTANEOUS at 08:24

## 2024-05-27 RX ADMIN — INSULIN LISPRO 1 UNITS: 100 INJECTION, SOLUTION INTRAVENOUS; SUBCUTANEOUS at 21:31

## 2024-05-27 RX ADMIN — OXYCODONE HYDROCHLORIDE AND ACETAMINOPHEN 2 TABLET: 5; 325 TABLET ORAL at 14:34

## 2024-05-27 RX ADMIN — OXYCODONE HYDROCHLORIDE AND ACETAMINOPHEN 2 TABLET: 5; 325 TABLET ORAL at 08:33

## 2024-05-27 RX ADMIN — HEPARIN SODIUM 5000 UNITS: 5000 INJECTION INTRAVENOUS; SUBCUTANEOUS at 06:03

## 2024-05-27 RX ADMIN — BACLOFEN 10 MG: 10 TABLET ORAL at 22:08

## 2024-05-27 RX ADMIN — MAGNESIUM OXIDE TAB 400 MG (241.3 MG ELEMENTAL MG) 400 MG: 400 (241.3 MG) TAB at 08:28

## 2024-05-27 RX ADMIN — CLOPIDOGREL BISULFATE 75 MG: 75 TABLET ORAL at 08:25

## 2024-05-27 RX ADMIN — METOPROLOL SUCCINATE 50 MG: 50 TABLET, EXTENDED RELEASE ORAL at 21:31

## 2024-05-27 RX ADMIN — BACLOFEN 10 MG: 10 TABLET ORAL at 11:34

## 2024-05-27 RX ADMIN — ATORVASTATIN CALCIUM 40 MG: 40 TABLET, FILM COATED ORAL at 08:25

## 2024-05-27 RX ADMIN — HEPARIN SODIUM 5000 UNITS: 5000 INJECTION INTRAVENOUS; SUBCUTANEOUS at 17:28

## 2024-05-27 NOTE — ASSESSMENT & PLAN NOTE
Patient presents with complaints of leg swelling; noted to have 21 pound weight gain in the last 3 months  Diuretics temporally held 2 weeks ago to see if this would help her urinary incontinence has follow up with urology 5/29   Echo 2023: EF 70%, grade 1 abnormal relaxation, dynamic LVOT obstruction, moderate annular mitral valve calcification, mild TR  Home regimen Lasix 20 mg daily, this has been increased to 40 mg once daily   Has follow up with cardiology 6/18/24 to reassess volume status and if she can go back on 20 mg or if tolerating increased dose   BMP in 1 week   Maintain fluid restriction, low-sodium diet, compression stocking, elevation  Outpatient follow-up with vascular surgery, wound care and cardiology  Goal for discharge tomorrow to [facility]

## 2024-05-27 NOTE — ASSESSMENT & PLAN NOTE
Stable   Continue iron supplementation   Lab Results   Component Value Date    HGB 11.7 05/26/2024    HGB 10.6 (L) 05/23/2024    HGB 11.2 (L) 05/22/2024    HGB 8.2 (L) 01/17/2024    HGB 8.4 (L) 01/16/2024

## 2024-05-27 NOTE — PLAN OF CARE
Problem: PAIN - ADULT  Goal: Verbalizes/displays adequate comfort level or baseline comfort level  Description: Interventions:  - Encourage patient to monitor pain and request assistance  - Assess pain using appropriate pain scale  - Administer analgesics based on type and severity of pain and evaluate response  - Implement non-pharmacological measures as appropriate and evaluate response  - Consider cultural and social influences on pain and pain management  - Notify physician/advanced practitioner if interventions unsuccessful or patient reports new pain  Outcome: Progressing     Problem: INFECTION - ADULT  Goal: Absence or prevention of progression during hospitalization  Description: INTERVENTIONS:  - Assess and monitor for signs and symptoms of infection  - Monitor lab/diagnostic results  - Monitor all insertion sites, i.e. indwelling lines, tubes, and drains  - Monitor endotracheal if appropriate and nasal secretions for changes in amount and color  - Vivian appropriate cooling/warming therapies per order  - Administer medications as ordered  - Instruct and encourage patient and family to use good hand hygiene technique  - Identify and instruct in appropriate isolation precautions for identified infection/condition  Outcome: Progressing  Goal: Absence of fever/infection during neutropenic period  Description: INTERVENTIONS:  - Monitor WBC    Outcome: Progressing     Problem: SAFETY ADULT  Goal: Patient will remain free of falls  Description: INTERVENTIONS:  - Educate patient/family on patient safety including physical limitations  - Instruct patient to call for assistance with activity   - Consult OT/PT to assist with strengthening/mobility   - Keep Call bell within reach  - Keep bed low and locked with side rails adjusted as appropriate  - Keep care items and personal belongings within reach  - Initiate and maintain comfort rounds  - Make Fall Risk Sign visible to staff  - Offer Toileting every 2 Hours,  in advance of need  - Initiate/Maintain bed alarm  - Obtain necessary fall risk management equipment: call bell  - Apply yellow socks and bracelet for high fall risk patients  - Consider moving patient to room near nurses station  Outcome: Progressing  Goal: Maintain or return to baseline ADL function  Description: INTERVENTIONS:  -  Assess patient's ability to carry out ADLs; assess patient's baseline for ADL function and identify physical deficits which impact ability to perform ADLs (bathing, care of mouth/teeth, toileting, grooming, dressing, etc.)  - Assess/evaluate cause of self-care deficits   - Assess range of motion  - Assess patient's mobility; develop plan if impaired  - Assess patient's need for assistive devices and provide as appropriate  - Encourage maximum independence but intervene and supervise when necessary  - Involve family in performance of ADLs  - Assess for home care needs following discharge   - Consider OT consult to assist with ADL evaluation and planning for discharge  - Provide patient education as appropriate  Outcome: Progressing  Goal: Maintains/Returns to pre admission functional level  Description: INTERVENTIONS:  - Perform AM-PAC 6 Click Basic Mobility/ Daily Activity assessment daily.  - Set and communicate daily mobility goal to care team and patient/family/caregiver.   - Collaborate with rehabilitation services on mobility goals if consulted  - Perform Range of Motion 3 times a day.  - Reposition patient every 2 hours.  - Dangle patient 3 times a day  - Stand patient 3 times a day  - Ambulate patient 3 times a day  - Out of bed to chair 3 times a day   - Out of bed for meals 3  Problem: DISCHARGE PLANNING  Goal: Discharge to home or other facility with appropriate resources  Description: INTERVENTIONS:  - Identify barriers to discharge w/patient and caregiver  - Arrange for needed discharge resources and transportation as appropriate  - Identify discharge learning needs (meds,  wound care, etc.)  - Arrange for interpretive services to assist at discharge as needed  - Refer to Case Management Department for coordinating discharge planning if the patient needs post-hospital services based on physician/advanced practitioner order or complex needs related to functional status, cognitive ability, or social support system  Outcome: Progressing     Problem: Knowledge Deficit  Goal: Patient/family/caregiver demonstrates understanding of disease process, treatment plan, medications, and discharge instructions  Description: Complete learning assessment and assess knowledge base.  Interventions:  - Provide teaching at level of understanding  - Provide teaching via preferred learning methods  Outcome: Progressing     Problem: Prexisting or High Potential for Compromised Skin Integrity  Goal: Skin integrity is maintained or improved  Description: INTERVENTIONS:  - Identify patients at risk for skin breakdown  - Assess and monitor skin integrity  - Assess and monitor nutrition and hydration status  - Monitor labs   - Assess for incontinence   - Turn and reposition patient  - Assist with mobility/ambulation  - Relieve pressure over bony prominences  - Avoid friction and shearing  - Provide appropriate hygiene as needed including keeping skin clean and dry  - Evaluate need for skin moisturizer/barrier cream  - Collaborate with interdisciplinary team   - Patient/family teaching  - Consider wound care consult   Outcome: Progressing     Problem: CARDIOVASCULAR - ADULT  Goal: Maintains optimal cardiac output and hemodynamic stability  Description: INTERVENTIONS:  - Monitor I/O, vital signs and rhythm  - Monitor for S/S and trends of decreased cardiac output  - Administer and titrate ordered vasoactive medications to optimize hemodynamic stability  - Assess quality of pulses, skin color and temperature  - Assess for signs of decreased coronary artery perfusion  - Instruct patient to report change in severity  of symptoms  Outcome: Progressing  Goal: Absence of cardiac dysrhythmias or at baseline rhythm  Description: INTERVENTIONS:  - Continuous cardiac monitoring, vital signs, obtain 12 lead EKG if ordered  - Administer antiarrhythmic and heart rate control medications as ordered  - Monitor electrolytes and administer replacement therapy as ordered  Outcome: Progressing    times a day  - Out of bed for toileting  - Record patient progress and toleration of activity level   Outcome: Progressing

## 2024-05-27 NOTE — ASSESSMENT & PLAN NOTE
Complaining of leg pain - right lower leg pain and right calf worse than left  venous stasis changes  Duplex negative for DVT  Seen by vascular surgery with chronic venous insufficiency, no evidence of significant arterial insufficiency  Recommend to continue local wound care, elevation of legs and compression, outpatient vascular surgery follow-up as well as wound care

## 2024-05-27 NOTE — CASE MANAGEMENT
Case Management Discharge Planning Note    Patient name Mattie Varela  Location [facility] /E4 -* MRN [id number]  : 1945 Date 2024       Current Admission Date: 2024  Current Admission Diagnosis:Acute on chronic diastolic congestive heart failure (HCC)   Patient Active Problem List    Diagnosis Date Noted Date Diagnosed    Leg pain, right 2024     Acute on chronic diastolic congestive heart failure (HCC) 2024     s/p [employer] loop recorder 3/2/2024 2024     Moderate protein-calorie malnutrition (HCC) 2024     Hypertensive urgency 2024     AMS (altered mental status) 2024     Encounter for examination for admission to nursing home 2024     Stage 3a chronic kidney disease ([facility]) 01/10/2024     Left ventricular outflow tract obstruction 2024     Obesity, Class I, BMI 30-34.9 2024     Urinary retention 2023     SADAF (acute kidney injury) ([facility]) 2023     Tremor 2023     Exertional shortness of breath 2023     Non obstructive CAD 11/15/2023     History of lumbar surgery 11/10/2023     Abnormal urinalysis 2023     Chronic obstructive pulmonary disease, unspecified COPD type ([facility]) 2023     Confusion with body shakes and blank stare 2023     Normocytic anemia 2023     Bilateral lower extremity edema 2023     Cerebrovascular accident (CVA), unspecified mechanism ([facility]) 2022     Stroke-like symptoms 2022     Prepyloric ulcer 2021     Diarrhea 2021     Mild intermittent asthma without complication 2020     S/P insertion of spinal cord stimulator 2018     Iron deficiency anemia secondary to inadequate dietary iron intake 2018     Type 2 diabetes mellitus with other skin complications ([facility])      Failed back surgical syndrome 2018     Hypothyroidism 2017     Degenerative lumbar spinal stenosis 2017     Glaucoma 2017     Overactive  bladder 08/04/2016     Dyspepsia 02/09/2016     Hypercholesterolemia 02/09/2016     Anxiety 02/09/2015     Chronic pain disorder 12/18/2013     Hypertension 06/12/2013       LOS (days): 4  Geometric Mean LOS (GMLOS) (days): 2.6  Days to GMLOS:-1.5     OBJECTIVE:  Risk of Unplanned Readmission Score: 22.13         Current admission status: Inpatient   Preferred Pharmacy:   [id number] - [place] - [address] [facility] [address]  [address] [facility] [address]  Phone: [phone] Fax: [phone]    [id number] - [place] - [address] [address]  Phone: [phone] Fax: [phone]    [id number] [place], [place] - [address] [address]  Phone: [phone] Fax: [phone]    [id number]  [place] - [address]  [address] [facility] [address]  Phone: [phone] Fax: [phone]    [facility] [place] - [address]  [address]  [address] [place] [address]  Phone: [phone] Fax: [phone]    [facility] [place] - [address]  [facility],  [address]  [facility],  First Floor [facility] [address]  Phone: [phone] Fax: [phone]    Primary Care Provider: NANY Pollock    Primary Insurance: [facility]  Secondary Insurance:     DISCHARGE DETAILS:    Additional Comments: CM consulted by [facility]IM for Pt's discharge back to [facility] today. CM called and spoke with the PCA. Unfortunately Pt will not be able to return until Tuesday, due to the [facility] being closed and having no access their pharmacy over the holiday weekend. CM was unable to connect with with the PCA again regarding the preferred pharmacy contact, as there are several choices on Pt's profile. CM to connect with [facility] Tuesday morning 5/28, to determine where the facility prefers  medications be sent. CM continues to follow. CM consult addressed.

## 2024-05-27 NOTE — PROGRESS NOTES
[facility]  Progress Note  Name: Mattie Varela I  MRN: [id number]  Unit/Bed#: E4 -01 I Date of Admission: 5/22/2024   Date of Service: 5/27/2024  Hospital Day: 5    Assessment & Plan   * Acute on chronic diastolic congestive heart failure (HCC)  Assessment & Plan  Patient presents with complaints of leg swelling; noted to have 21 pound weight gain in the last 3 months  Diuretics temporally held 2 weeks ago to see if this would help her urinary incontinence has follow up with urology 5/29   Echo 2023: EF 70%, grade 1 abnormal relaxation, dynamic LVOT obstruction, moderate annular mitral valve calcification, mild TR  Home regimen Lasix 20 mg daily, this has been increased to 40 mg once daily   Has follow up with cardiology 6/18/24 to reassess volume status and if she can go back on 20 mg or if tolerating increased dose   BMP in 1 week   Maintain fluid restriction, low-sodium diet, compression stocking, elevation  Outpatient follow-up with vascular surgery, wound care and cardiology  Goal for discharge tomorrow to [facility]     Leg pain, right  Assessment & Plan  Complaining of leg pain - right lower leg pain and right calf worse than left  venous stasis changes  Duplex negative for DVT  Seen by vascular surgery with chronic venous insufficiency, no evidence of significant arterial insufficiency  Recommend to continue local wound care, elevation of legs and compression, outpatient vascular surgery follow-up as well as wound care    Stage 3a chronic kidney disease (HCC)  Assessment & Plan  Lab Results   Component Value Date    EGFR 43 05/26/2024    EGFR 35 05/25/2024    EGFR 35 05/24/2024    CREATININE 1.17 05/26/2024    CREATININE 1.39 (H) 05/25/2024    CREATININE 1.39 (H) 05/24/2024   Patient with history of CKD 3; stable on labs yesterday   Repeat BMP in 1 week to monitor renal function and electrolytes     Left ventricular outflow obstruction  Assessment & Plan  Known - follows with  Thy have a puppy and today found out that it has genardia--parasite that puppies get and was told the doctors office to see what Dr. Solorzano would recommend.   Dr. Boyer outpt   Next follow up 6/18     Chronic obstructive pulmonary disease, unspecified COPD type ([facility])  Assessment & Plan  History of COPD; stable on room air  Denies symptoms of shortness of breath/dyspnea on exertion; not in acute exacerbation  Continue home inhalers    Cerebrovascular accident (CVA), unspecified mechanism ([facility])  Assessment & Plan  Patient with history of CVA  Continue aspirin, Plavix, Lipitor    Overactive bladder  Assessment & Plan  Has follow-up on 5/29 with urology    Iron deficiency anemia secondary to inadequate dietary iron intake  Assessment & Plan  Stable   Continue iron supplementation   Lab Results   Component Value Date    HGB 11.7 05/26/2024    HGB 10.6 (L) 05/23/2024    HGB 11.2 (L) 05/22/2024    HGB 8.2 (L) 01/17/2024    HGB 8.4 (L) 01/16/2024           Hypertension  Assessment & Plan  Home regimen amlodipine 5 mg daily, metoprolol succinate 50 mg twice daily   Added lasix 40 mg daily     Type 2 diabetes mellitus with other skin complications ([facility])  Assessment & Plan  Lab Results   Component Value Date    HGBA1C 6.5 (H) 11/26/2023     Recent Labs     05/26/24  1552 05/26/24  2150 05/27/24  0740 05/27/24  1138   POCGLU 154* 177* 152* 146*     Home regimen oral metformin - resume at discharge   A1c well controlled              VTE Pharmacologic Prophylaxis: VTE Score: 4 Moderate Risk (Score 3-4) - Pharmacological DVT Prophylaxis Ordered: heparin.    Mobility:   Basic Mobility Inpatient Raw Score: 20  JH-HLM Goal: 6: Walk 10 steps or more  JH-HLM Achieved: 6: Walk 10 steps or more  JH-HLM Goal achieved. Continue to encourage appropriate mobility.    Education and Discussions with Family / Patient: Updated  (daughter) via phone.    Total Time Spent on Date of Encounter in care of patient:  mins. This time was spent on one or more of the following: performing physical exam; counseling and coordination of care; obtaining or reviewing history; documenting in the  medical record; reviewing/ordering tests, medications or procedures; communicating with other healthcare professionals and discussing with patient's family/caregivers.    Current Length of Stay: 5 day(s)  Current Patient Status: Inpatient   Certification Statement: The patient will continue to require additional inpatient hospital stay due to acute on chronic diastolic CHF  Discharge Plan: Anticipate discharge tomorrow to [facility]     Code Status: Level 1 - Full Code    Subjective:   Patient feeling well today.  No chest pain shortness of breath fevers chills lightheadedness dizziness abdominal pain nausea vomiting diarrhea.  Leg swelling has improved and continues with Ace compression wrapping and they are elevated at this time.    Objective:     Vitals:   Temp (24hrs), Av.9 °F (36.6 °C), Min:97.6 °F (36.4 °C), Max:98.3 °F (36.8 °C)    Temp:  [97.6 °F (36.4 °C)-98.3 °F (36.8 °C)] 97.6 °F (36.4 °C)  HR:  [68-78] 68  Resp:  [18] 18  BP: (104-157)/(59-74) 125/59  SpO2:  [91 %-96 %] 94 %  Body mass index is 32.38 kg/m².     Input and Output Summary (last 24 hours):     Intake/Output Summary (Last 24 hours) at 2024 1335  Last data filed at 2024 1254  Gross per 24 hour   Intake 920 ml   Output 1275 ml   Net -355 ml       Physical Exam:   Physical Exam  Vitals and nursing note reviewed.   Constitutional:       General: She is not in acute distress.     Appearance: She is not ill-appearing, toxic-appearing or diaphoretic.   Cardiovascular:      Rate and Rhythm: Normal rate and regular rhythm.   Pulmonary:      Effort: Pulmonary effort is normal. No respiratory distress.      Breath sounds: Normal breath sounds. No wheezing or rales.   Abdominal:      General: Bowel sounds are normal.      Palpations: Abdomen is soft.   Skin:     Comments: Ace dressings over lower legs bilaterally    Neurological:      Mental Status: She is alert. Mental status is at baseline.   Psychiatric:         Mood and Affect: Mood normal.           Additional Data:     Labs:  Results from last 7 days   Lab Units 05/26/24  0547 05/23/24  0531   WBC Thousand/uL 6.55 4.98   HEMOGLOBIN g/dL 11.7 10.6*   HEMATOCRIT % 36.7 33.3*   PLATELETS Thousands/uL 264 258   SEGS PCT %  --  42*   LYMPHO PCT %  --  40   MONO PCT %  --  11   EOS PCT %  --  6     Results from last 7 days   Lab Units 05/26/24  0547   SODIUM mmol/L 138   POTASSIUM mmol/L 3.8   CHLORIDE mmol/L 98   CO2 mmol/L 32   BUN mg/dL 25   CREATININE mg/dL 1.17   ANION GAP mmol/L 8   CALCIUM mg/dL 10.0   ALBUMIN g/dL 3.8   TOTAL BILIRUBIN mg/dL 0.41   ALK PHOS U/L 110*   ALT U/L 8   AST U/L 16   GLUCOSE RANDOM mg/dL 137     Results from last 7 days   Lab Units 05/22/24  1544   INR  0.94     Results from last 7 days   Lab Units 05/27/24  1138 05/27/24  0740 05/26/24  2150 05/26/24  1552 05/26/24  1130 05/26/24  0713 05/25/24  2039 05/25/24  1614 05/25/24  1155 05/25/24  0720 05/24/24  2032 05/24/24  1603   POC GLUCOSE mg/dl 146* 152* 177* 154* 199* 156* 175* 126 206* 151* 156* 114               Lines/Drains:  Invasive Devices       Peripheral Intravenous Line  Duration             Peripheral IV 05/26/24 Left;Ventral (anterior) Forearm 1 day                          Imaging: echo, ultradouns, xray     Recent Cultures (last 7 days):         Last 24 Hours Medication List:   Current Facility-Administered Medications   Medication Dose Route Frequency Provider Last Rate    acetaminophen  650 mg Oral Q6H PRN Gee Loza MD      amLODIPine  5 mg Oral Daily Susannah Mcclain PA-C      aspirin  81 mg Oral Daily Gee Loza MD      atorvastatin  40 mg Oral Daily Gee Loza MD      baclofen  10 mg Oral BID PRN Gee Loza MD      clopidogrel  75 mg Oral Daily Gee Loza MD      ferrous sulfate  325 mg Oral Daily With Breakfast Gee Loza MD      furosemide  40 mg Oral Daily Susannah Mcclain PA-C      heparin (porcine)  5,000 Units Subcutaneous Q8H [facility] Gee Loza MD      HYDROmorphone  0.2 mg  Intravenous Q6H PRN NANY Miller      insulin lispro  1-5 Units Subcutaneous TID AC Gee Loza MD      insulin lispro  1-5 Units Subcutaneous HS Gee Loza MD      latanoprost  1 drop Both Eyes HS Gee Loza MD      levothyroxine  25 mcg Oral Early Morning Gee Loza MD      magnesium Oxide  400 mg Oral BID Gee Loza MD      meclizine  25 mg Oral 4x Daily PRN Gee Loza MD      melatonin  9 mg Oral HS NANY Miller      metoprolol succinate  50 mg Oral Q12H ARY Gee Loza MD      Milnacipran HCl  1 tablet Oral Daily Gee Loza MD      ondansetron  4 mg Intravenous Q6H PRN Gee Loza MD      oxybutynin  10 mg Oral Daily Gee Loza MD      oxyCODONE-acetaminophen  2 tablet Oral Q6H PRN Gee Loza MD      pantoprazole  40 mg Oral Daily Before Breakfast eGe Loza MD      senna-docusate sodium  2 tablet Oral BID Gee Loza MD          Today, Patient Was Seen By: Krystina Gilliam PA-C    **Please Note: This note may have been constructed using a voice recognition system.**

## 2024-05-27 NOTE — PLAN OF CARE
Problem: PAIN - ADULT  Goal: Verbalizes/displays adequate comfort level or baseline comfort level  Description: Interventions:  - Encourage patient to monitor pain and request assistance  - Assess pain using appropriate pain scale  - Administer analgesics based on type and severity of pain and evaluate response  - Implement non-pharmacological measures as appropriate and evaluate response  - Consider cultural and social influences on pain and pain management  - Notify physician/advanced practitioner if interventions unsuccessful or patient reports new pain  Outcome: Progressing     Problem: INFECTION - ADULT  Goal: Absence or prevention of progression during hospitalization  Description: INTERVENTIONS:  - Assess and monitor for signs and symptoms of infection  - Monitor lab/diagnostic results  - Monitor all insertion sites, i.e. indwelling lines, tubes, and drains  - Monitor endotracheal if appropriate and nasal secretions for changes in amount and color  - [place] appropriate cooling/warming therapies per order  - Administer medications as ordered  - Instruct and encourage patient and family to use good hand hygiene technique  - Identify and instruct in appropriate isolation precautions for identified infection/condition  Outcome: Progressing  Goal: Absence of fever/infection during neutropenic period  Description: INTERVENTIONS:  - Monitor WBC    Outcome: Progressing     Problem: SAFETY ADULT  Goal: Patient will remain free of falls  Description: INTERVENTIONS:  - Educate patient/family on patient safety including physical limitations  - Instruct patient to call for assistance with activity   - Consult OT/PT to assist with strengthening/mobility   - Keep Call bell within reach  - Keep bed low and locked with side rails adjusted as appropriate  - Keep care items and personal belongings within reach  - Initiate and maintain comfort rounds  - Make Fall Risk Sign visible to staff  - Offer Toileting every 2 Hours,  in advance of need  - Initiate/Maintain bed alarm  - Obtain necessary fall risk management equipment: alarm   - Apply yellow socks and bracelet for high fall risk patients  - Consider moving patient to room near nurses station  Outcome: Progressing  Goal: Maintain or return to baseline ADL function  Description: INTERVENTIONS:  -  Assess patient's ability to carry out ADLs; assess patient's baseline for ADL function and identify physical deficits which impact ability to perform ADLs (bathing, care of mouth/teeth, toileting, grooming, dressing, etc.)  - Assess/evaluate cause of self-care deficits   - Assess range of motion  - Assess patient's mobility; develop plan if impaired  - Assess patient's need for assistive devices and provide as appropriate  - Encourage maximum independence but intervene and supervise when necessary  - Involve family in performance of ADLs  - Assess for home care needs following discharge   - Consider OT consult to assist with ADL evaluation and planning for discharge  - Provide patient education as appropriate  Outcome: Progressing  Goal: Maintains/Returns to pre admission functional level  Description: INTERVENTIONS:  - Perform AM-PAC 6 Click Basic Mobility/ Daily Activity assessment daily.  - Set and communicate daily mobility goal to care team and patient/family/caregiver.   - Collaborate with rehabilitation services on mobility goals if consulted  - Perform Range of Motion 3 times a day.  - Reposition patient every 2 hours.  - Dangle patient 3 times a day  - Stand patient 3 times a day  - Ambulate patient 3 times a day  - Out of bed to chair 3 times a day   - Out of bed for meals 3 times a day  - Out of bed for toileting  - Record patient progress and toleration of activity level   Outcome: Progressing     Problem: DISCHARGE PLANNING  Goal: Discharge to home or other facility with appropriate resources  Description: INTERVENTIONS:  - Identify barriers to discharge w/patient and  caregiver  - Arrange for needed discharge resources and transportation as appropriate  - Identify discharge learning needs (meds, wound care, etc.)  - Arrange for interpretive services to assist at discharge as needed  - Refer to Case Management Department for coordinating discharge planning if the patient needs post-hospital services based on physician/advanced practitioner order or complex needs related to functional status, cognitive ability, or social support system  Outcome: Progressing     Problem: Knowledge Deficit  Goal: Patient/family/caregiver demonstrates understanding of disease process, treatment plan, medications, and discharge instructions  Description: Complete learning assessment and assess knowledge base.  Interventions:  - Provide teaching at level of understanding  - Provide teaching via preferred learning methods  Outcome: Progressing     Problem: Prexisting or High Potential for Compromised Skin Integrity  Goal: Skin integrity is maintained or improved  Description: INTERVENTIONS:  - Identify patients at risk for skin breakdown  - Assess and monitor skin integrity  - Assess and monitor nutrition and hydration status  - Monitor labs   - Assess for incontinence   - Turn and reposition patient  - Assist with mobility/ambulation  - Relieve pressure over bony prominences  - Avoid friction and shearing  - Provide appropriate hygiene as needed including keeping skin clean and dry  - Evaluate need for skin moisturizer/barrier cream  - Collaborate with interdisciplinary team   - Patient/family teaching  - Consider wound care consult   Outcome: Progressing     Problem: CARDIOVASCULAR - ADULT  Goal: Maintains optimal cardiac output and hemodynamic stability  Description: INTERVENTIONS:  - Monitor I/O, vital signs and rhythm  - Monitor for S/S and trends of decreased cardiac output  - Administer and titrate ordered vasoactive medications to optimize hemodynamic stability  - Assess quality of pulses, skin  color and temperature  - Assess for signs of decreased coronary artery perfusion  - Instruct patient to report change in severity of symptoms  Outcome: Progressing  Goal: Absence of cardiac dysrhythmias or at baseline rhythm  Description: INTERVENTIONS:  - Continuous cardiac monitoring, vital signs, obtain 12 lead EKG if ordered  - Administer antiarrhythmic and heart rate control medications as ordered  - Monitor electrolytes and administer replacement therapy as ordered  Outcome: Progressing

## 2024-05-27 NOTE — ASSESSMENT & PLAN NOTE
Home regimen amlodipine 5 mg daily, metoprolol succinate 50 mg twice daily   Added lasix 40 mg daily

## 2024-05-27 NOTE — ASSESSMENT & PLAN NOTE
Lab Results   Component Value Date    HGBA1C 6.5 (H) 11/26/2023     Recent Labs     05/26/24  1552 05/26/24  2150 05/27/24  0740 05/27/24  1138   POCGLU 154* 177* 152* 146*     Home regimen oral metformin - resume at discharge   A1c well controlled

## 2024-05-27 NOTE — ASSESSMENT & PLAN NOTE
Lab Results   Component Value Date    EGFR 43 05/26/2024    EGFR 35 05/25/2024    EGFR 35 05/24/2024    CREATININE 1.17 05/26/2024    CREATININE 1.39 (H) 05/25/2024    CREATININE 1.39 (H) 05/24/2024   Patient with history of CKD 3; stable on labs yesterday   Repeat BMP in 1 week to monitor renal function and electrolytes

## 2024-05-28 VITALS
SYSTOLIC BLOOD PRESSURE: 111 MMHG | HEART RATE: 71 BPM | DIASTOLIC BLOOD PRESSURE: 60 MMHG | TEMPERATURE: 97.5 F | HEIGHT: 60 IN | RESPIRATION RATE: 18 BRPM | BODY MASS INDEX: 32.68 KG/M2 | OXYGEN SATURATION: 95 % | WEIGHT: 166.45 LBS

## 2024-05-28 LAB
GLUCOSE SERPL-MCNC: 154 MG/DL (ref 65–140)
GLUCOSE SERPL-MCNC: 191 MG/DL (ref 65–140)

## 2024-05-28 PROCEDURE — 99239 HOSP IP/OBS DSCHRG MGMT >30: CPT | Performed by: PHYSICIAN ASSISTANT

## 2024-05-28 PROCEDURE — 82948 REAGENT STRIP/BLOOD GLUCOSE: CPT

## 2024-05-28 RX ORDER — FUROSEMIDE 20 MG/1
40 TABLET ORAL DAILY
Qty: 60 TABLET | Refills: 0 | Status: SHIPPED | OUTPATIENT
Start: 2024-05-28 | End: 2024-06-05

## 2024-05-28 RX ADMIN — HEPARIN SODIUM 5000 UNITS: 5000 INJECTION INTRAVENOUS; SUBCUTANEOUS at 05:13

## 2024-05-28 RX ADMIN — BACLOFEN 10 MG: 10 TABLET ORAL at 11:26

## 2024-05-28 RX ADMIN — OXYBUTYNIN CHLORIDE 10 MG: 10 TABLET, EXTENDED RELEASE ORAL at 08:40

## 2024-05-28 RX ADMIN — ATORVASTATIN CALCIUM 40 MG: 40 TABLET, FILM COATED ORAL at 08:40

## 2024-05-28 RX ADMIN — METOPROLOL SUCCINATE 50 MG: 50 TABLET, EXTENDED RELEASE ORAL at 08:41

## 2024-05-28 RX ADMIN — FERROUS SULFATE TAB 325 MG (65 MG ELEMENTAL FE) 325 MG: 325 (65 FE) TAB at 08:40

## 2024-05-28 RX ADMIN — SENNOSIDES AND DOCUSATE SODIUM 2 TABLET: 8.6; 5 TABLET ORAL at 08:40

## 2024-05-28 RX ADMIN — OXYCODONE HYDROCHLORIDE AND ACETAMINOPHEN 2 TABLET: 5; 325 TABLET ORAL at 02:35

## 2024-05-28 RX ADMIN — LEVOTHYROXINE SODIUM 25 MCG: 25 TABLET ORAL at 05:13

## 2024-05-28 RX ADMIN — MAGNESIUM OXIDE TAB 400 MG (241.3 MG ELEMENTAL MG) 400 MG: 400 (241.3 MG) TAB at 08:41

## 2024-05-28 RX ADMIN — INSULIN LISPRO 1 UNITS: 100 INJECTION, SOLUTION INTRAVENOUS; SUBCUTANEOUS at 08:43

## 2024-05-28 RX ADMIN — AMLODIPINE BESYLATE 5 MG: 5 TABLET ORAL at 08:41

## 2024-05-28 RX ADMIN — ASPIRIN 81 MG CHEWABLE TABLET 81 MG: 81 TABLET CHEWABLE at 08:40

## 2024-05-28 RX ADMIN — OXYCODONE HYDROCHLORIDE AND ACETAMINOPHEN 2 TABLET: 5; 325 TABLET ORAL at 11:52

## 2024-05-28 RX ADMIN — CLOPIDOGREL BISULFATE 75 MG: 75 TABLET ORAL at 08:41

## 2024-05-28 RX ADMIN — PANTOPRAZOLE SODIUM 40 MG: 40 TABLET, DELAYED RELEASE ORAL at 05:13

## 2024-05-28 RX ADMIN — FUROSEMIDE 40 MG: 40 TABLET ORAL at 08:41

## 2024-05-28 NOTE — ASSESSMENT & PLAN NOTE
Patient presents with complaints of leg swelling; noted to have 21 pound weight gain in the last 3 months  Diuretics temporally held 2 weeks ago to see if this would help her urinary incontinence has follow up with urology 5/29   Echo 2023: EF 70%, grade 1 abnormal relaxation, dynamic LVOT obstruction, moderate annular mitral valve calcification, mild TR  Home regimen Lasix 20 mg daily, this has been increased to 40 mg once daily   Has follow up with cardiology 6/18/24 to reassess volume status and if she can go back on 20 mg or if tolerating increased dose   BMP in 1 week   Maintain fluid restriction, low-sodium diet, compression stocking, elevation  Outpatient follow-up with vascular surgery, wound care and cardiology  [facility] to [facility]   Called daughter for update prior to discharge

## 2024-05-28 NOTE — NURSING NOTE
Patient discharged, stable at this time.  IV removed.  Report called to Antonella at [facility].  No further questions at this time.  Patient wheeled out by transporter in wheelchair.

## 2024-05-28 NOTE — ASSESSMENT & PLAN NOTE
Lab Results   Component Value Date    HGBA1C 6.5 (H) 11/26/2023     Recent Labs     05/27/24  1601 05/27/24 2058 05/28/24  0741 05/28/24  1111   POCGLU 147* 170* 154* 191*     Home regimen oral metformin - resume at discharge   A1c well controlled

## 2024-05-28 NOTE — RESTORATIVE TECHNICIAN NOTE
Restorative Technician Note      Patient Name: Mattie Varela     Restorative Tech Visit Date: 05/28/24  Note Type: Mobility  Patient Position Upon Consult: Bedside chair  Activity Performed: Ambulated; Range of motion  Assistive Device: Roller walker  Patient Position at End of Consult: All needs within reach; Bedside chair

## 2024-05-28 NOTE — PLAN OF CARE
Problem: PAIN - ADULT  Goal: Verbalizes/displays adequate comfort level or baseline comfort level  Description: Interventions:  - Encourage patient to monitor pain and request assistance  - Assess pain using appropriate pain scale  - Administer analgesics based on type and severity of pain and evaluate response  - Implement non-pharmacological measures as appropriate and evaluate response  - Consider cultural and social influences on pain and pain management  - Notify physician/advanced practitioner if interventions unsuccessful or patient reports new pain  Outcome: Progressing     Problem: INFECTION - ADULT  Goal: Absence or prevention of progression during hospitalization  Description: INTERVENTIONS:  - Assess and monitor for signs and symptoms of infection  - Monitor lab/diagnostic results  - Monitor all insertion sites, i.e. indwelling lines, tubes, and drains  - Monitor endotracheal if appropriate and nasal secretions for changes in amount and color  - [place] appropriate cooling/warming therapies per order  - Administer medications as ordered  - Instruct and encourage patient and family to use good hand hygiene technique  - Identify and instruct in appropriate isolation precautions for identified infection/condition  Outcome: Progressing  Goal: Absence of fever/infection during neutropenic period  Description: INTERVENTIONS:  - Monitor WBC    Outcome: Progressing     Problem: SAFETY ADULT  Goal: Patient will remain free of falls  Description: INTERVENTIONS:  - Educate patient/family on patient safety including physical limitations  - Instruct patient to call for assistance with activity   - Consult OT/PT to assist with strengthening/mobility   - Keep Call bell within reach  - Keep bed low and locked with side rails adjusted as appropriate  - Keep care items and personal belongings within reach  - Initiate and maintain comfort rounds  - Make Fall Risk Sign visible to staff  - Offer Toileting every 2 Hours,  in advance of need  - Initiate/Maintain bed alarm  - Obtain necessary fall risk management equipment: In place   - Apply yellow socks and bracelet for high fall risk patients  - Consider moving patient to room near nurses station  Outcome: Progressing  Goal: Maintain or return to baseline ADL function  Description: INTERVENTIONS:  -  Assess patient's ability to carry out ADLs; assess patient's baseline for ADL function and identify physical deficits which impact ability to perform ADLs (bathing, care of mouth/teeth, toileting, grooming, dressing, etc.)  - Assess/evaluate cause of self-care deficits   - Assess range of motion  - Assess patient's mobility; develop plan if impaired  - Assess patient's need for assistive devices and provide as appropriate  - Encourage maximum independence but intervene and supervise when necessary  - Involve family in performance of ADLs  - Assess for home care needs following discharge   - Consider OT consult to assist with ADL evaluation and planning for discharge  - Provide patient education as appropriate  Outcome: Progressing  Goal: Maintains/Returns to pre admission functional level  Description: INTERVENTIONS:  - Perform AM-PAC 6 Click Basic Mobility/ Daily Activity assessment daily.  - Set and communicate daily mobility goal to care team and patient/family/caregiver.   - Collaborate with rehabilitation services on mobility goals if consulted  - Perform Range of Motion 3 times a day.  - Reposition patient every 2 hours.  - Dangle patient 3 times a day  - Stand patient 3 times a day  - Ambulate patient 3 times a day  - Out of bed to chair 3 times a day   - Out of bed for meals 3 times a day  - Out of bed for toileting  - Record patient progress and toleration of activity level   Outcome: Progressing     Problem: DISCHARGE PLANNING  Goal: Discharge to home or other facility with appropriate resources  Description: INTERVENTIONS:  - Identify barriers to discharge w/patient and  caregiver  - Arrange for needed discharge resources and transportation as appropriate  - Identify discharge learning needs (meds, wound care, etc.)  - Arrange for interpretive services to assist at discharge as needed  - Refer to Case Management Department for coordinating discharge planning if the patient needs post-hospital services based on physician/advanced practitioner order or complex needs related to functional status, cognitive ability, or social support system  Outcome: Progressing     Problem: Knowledge Deficit  Goal: Patient/family/caregiver demonstrates understanding of disease process, treatment plan, medications, and discharge instructions  Description: Complete learning assessment and assess knowledge base.  Interventions:  - Provide teaching at level of understanding  - Provide teaching via preferred learning methods  Outcome: Progressing     Problem: Prexisting or High Potential for Compromised Skin Integrity  Goal: Skin integrity is maintained or improved  Description: INTERVENTIONS:  - Identify patients at risk for skin breakdown  - Assess and monitor skin integrity  - Assess and monitor nutrition and hydration status  - Monitor labs   - Assess for incontinence   - Turn and reposition patient  - Assist with mobility/ambulation  - Relieve pressure over bony prominences  - Avoid friction and shearing  - Provide appropriate hygiene as needed including keeping skin clean and dry  - Evaluate need for skin moisturizer/barrier cream  - Collaborate with interdisciplinary team   - Patient/family teaching  - Consider wound care consult   Outcome: Progressing     Problem: CARDIOVASCULAR - ADULT  Goal: Maintains optimal cardiac output and hemodynamic stability  Description: INTERVENTIONS:  - Monitor I/O, vital signs and rhythm  - Monitor for S/S and trends of decreased cardiac output  - Administer and titrate ordered vasoactive medications to optimize hemodynamic stability  - Assess quality of pulses, skin  color and temperature  - Assess for signs of decreased coronary artery perfusion  - Instruct patient to report change in severity of symptoms  Outcome: Progressing  Goal: Absence of cardiac dysrhythmias or at baseline rhythm  Description: INTERVENTIONS:  - Continuous cardiac monitoring, vital signs, obtain 12 lead EKG if ordered  - Administer antiarrhythmic and heart rate control medications as ordered  - Monitor electrolytes and administer replacement therapy as ordered  Outcome: Progressing

## 2024-05-28 NOTE — PLAN OF CARE
Problem: PAIN - ADULT  Goal: Verbalizes/displays adequate comfort level or baseline comfort level  Description: Interventions:  - Encourage patient to monitor pain and request assistance  - Assess pain using appropriate pain scale  - Administer analgesics based on type and severity of pain and evaluate response  - Implement non-pharmacological measures as appropriate and evaluate response  - Consider cultural and social influences on pain and pain management  - Notify physician/advanced practitioner if interventions unsuccessful or patient reports new pain  Outcome: Completed     Problem: INFECTION - ADULT  Goal: Absence or prevention of progression during hospitalization  Description: INTERVENTIONS:  - Assess and monitor for signs and symptoms of infection  - Monitor lab/diagnostic results  - Monitor all insertion sites, i.e. indwelling lines, tubes, and drains  - Monitor endotracheal if appropriate and nasal secretions for changes in amount and color  - [place] appropriate cooling/warming therapies per order  - Administer medications as ordered  - Instruct and encourage patient and family to use good hand hygiene technique  - Identify and instruct in appropriate isolation precautions for identified infection/condition  Outcome: Completed  Goal: Absence of fever/infection during neutropenic period  Description: INTERVENTIONS:  - Monitor WBC    Outcome: Completed     Problem: SAFETY ADULT  Goal: Patient will remain free of falls  Description: INTERVENTIONS:  - Educate patient/family on patient safety including physical limitations  - Instruct patient to call for assistance with activity   - Consult OT/PT to assist with strengthening/mobility   - Keep Call bell within reach  - Keep bed low and locked with side rails adjusted as appropriate  - Keep care items and personal belongings within reach  - Initiate and maintain comfort rounds  - Make Fall Risk Sign visible to staff  - Offer Toileting every  Hours, in  advance of need  - Initiate/Maintain alarm  - Obtain necessary fall risk management equipment:   - Apply yellow socks and bracelet for high fall risk patients  - Consider moving patient to room near nurses station  Outcome: Completed  Goal: Maintain or return to baseline ADL function  Description: INTERVENTIONS:  -  Assess patient's ability to carry out ADLs; assess patient's baseline for ADL function and identify physical deficits which impact ability to perform ADLs (bathing, care of mouth/teeth, toileting, grooming, dressing, etc.)  - Assess/evaluate cause of self-care deficits   - Assess range of motion  - Assess patient's mobility; develop plan if impaired  - Assess patient's need for assistive devices and provide as appropriate  - Encourage maximum independence but intervene and supervise when necessary  - Involve family in performance of ADLs  - Assess for home care needs following discharge   - Consider OT consult to assist with ADL evaluation and planning for discharge  - Provide patient education as appropriate  Outcome: Completed  Goal: Maintains/Returns to pre admission functional level  Description: INTERVENTIONS:  - Perform AM-PAC 6 Click Basic Mobility/ Daily Activity assessment daily.  - Set and communicate daily mobility goal to care team and patient/family/caregiver.   - Collaborate with rehabilitation services on mobility goals if consulted  - Perform Range of Motion  times a day.  - Reposition patient every  hours.  - Dangle patient  times a day  - Stand patient  times a day  - Ambulate patient  times a day  - Out of bed to chair times a day   - Out of bed for meals times a day  - Out of bed for toileting  - Record patient progress and toleration of activity level   Outcome: Completed     Problem: DISCHARGE PLANNING  Goal: Discharge to home or other facility with appropriate resources  Description: INTERVENTIONS:  - Identify barriers to discharge w/patient and caregiver  - Arrange for needed  discharge resources and transportation as appropriate  - Identify discharge learning needs (meds, wound care, etc.)  - Arrange for interpretive services to assist at discharge as needed  - Refer to Case Management Department for coordinating discharge planning if the patient needs post-hospital services based on physician/advanced practitioner order or complex needs related to functional status, cognitive ability, or social support system  Outcome: Completed     Problem: Knowledge Deficit  Goal: Patient/family/caregiver demonstrates understanding of disease process, treatment plan, medications, and discharge instructions  Description: Complete learning assessment and assess knowledge base.  Interventions:  - Provide teaching at level of understanding  - Provide teaching via preferred learning methods  Outcome: Completed     Problem: Prexisting or High Potential for Compromised Skin Integrity  Goal: Skin integrity is maintained or improved  Description: INTERVENTIONS:  - Identify patients at risk for skin breakdown  - Assess and monitor skin integrity  - Assess and monitor nutrition and hydration status  - Monitor labs   - Assess for incontinence   - Turn and reposition patient  - Assist with mobility/ambulation  - Relieve pressure over bony prominences  - Avoid friction and shearing  - Provide appropriate hygiene as needed including keeping skin clean and dry  - Evaluate need for skin moisturizer/barrier cream  - Collaborate with interdisciplinary team   - Patient/family teaching  - Consider wound care consult   Outcome: Completed     Problem: CARDIOVASCULAR - ADULT  Goal: Maintains optimal cardiac output and hemodynamic stability  Description: INTERVENTIONS:  - Monitor I/O, vital signs and rhythm  - Monitor for S/S and trends of decreased cardiac output  - Administer and titrate ordered vasoactive medications to optimize hemodynamic stability  - Assess quality of pulses, skin color and temperature  - Assess for  signs of decreased coronary artery perfusion  - Instruct patient to report change in severity of symptoms  Outcome: Completed  Goal: Absence of cardiac dysrhythmias or at baseline rhythm  Description: INTERVENTIONS:  - Continuous cardiac monitoring, vital signs, obtain 12 lead EKG if ordered  - Administer antiarrhythmic and heart rate control medications as ordered  - Monitor electrolytes and administer replacement therapy as ordered  Outcome: Completed

## 2024-05-28 NOTE — DISCHARGE SUMMARY
[facility]  Discharge- Mattie Varela 1945, 79 y.o. female MRN: [id number]  Unit/Bed#: E4 -01 Encounter: [id number]  Primary Care Provider: NANY Pollock   Date and time admitted to hospital: 5/22/2024  2:51 PM    * Acute on chronic diastolic congestive heart failure (HCC)  Assessment & Plan  Patient presents with complaints of leg swelling; noted to have 21 pound weight gain in the last 3 months  Diuretics temporally held 2 weeks ago to see if this would help her urinary incontinence has follow up with urology 5/29   Echo 2023: EF 70%, grade 1 abnormal relaxation, dynamic LVOT obstruction, moderate annular mitral valve calcification, mild TR  Home regimen Lasix 20 mg daily, this has been increased to 40 mg once daily   Has follow up with cardiology 6/18/24 to reassess volume status and if she can go back on 20 mg or if tolerating increased dose   BMP in 1 week   Maintain fluid restriction, low-sodium diet, compression stocking, elevation  Outpatient follow-up with vascular surgery, wound care and cardiology  Discharge to [facility]   Called daughter for update prior to discharge     Leg pain, right  Assessment & Plan  Complaining of leg pain - right lower leg pain and right calf worse than left, improving with increase in ambualtion and imrpovement in swelling   venous stasis changes  Duplex negative for DVT  Seen by vascular surgery with chronic venous insufficiency, no evidence of significant arterial insufficiency  Recommend to continue local wound care, elevation of legs and compression, outpatient vascular surgery follow-up as well as wound care    Stage 3a chronic kidney disease (HCC)  Assessment & Plan  Lab Results   Component Value Date    EGFR 43 05/26/2024    EGFR 35 05/25/2024    EGFR 35 05/24/2024    CREATININE 1.17 05/26/2024    CREATININE 1.39 (H) 05/25/2024    CREATININE 1.39 (H) 05/24/2024   Patient with history of CKD 3; stable on labs yesterday    Repeat BMP in 1 week to monitor renal function and electrolytes     Left ventricular outflow obstruction  Assessment & Plan  Known - follows with Dr. Boyer outpt   Next follow up 6/18     Chronic obstructive pulmonary disease, unspecified COPD type (HCC)  Assessment & Plan  History of COPD; stable on room air  Denies symptoms of shortness of breath/dyspnea on exertion; not in acute exacerbation  Continue home inhalers    Cerebrovascular accident (CVA), unspecified mechanism (HCC)  Assessment & Plan  Patient with history of CVA  Continue aspirin, Plavix, Lipitor    Overactive bladder  Assessment & Plan  Has follow-up on 5/29 with urology    Iron deficiency anemia secondary to inadequate dietary iron intake  Assessment & Plan  Stable   Continue iron supplementation   Lab Results   Component Value Date    HGB 11.7 05/26/2024    HGB 10.6 (L) 05/23/2024    HGB 11.2 (L) 05/22/2024    HGB 8.2 (L) 01/17/2024    HGB 8.4 (L) 01/16/2024           Hypertension  Assessment & Plan  Home regimen amlodipine 5 mg daily, metoprolol succinate 50 mg twice daily   Stopped norvasc to avoid issues with leg edema   Added lasix 40 mg daily   Low sodium diet   Follow BP trends outpt with PCP to see if need to add back an agent with holding norvasc     Type 2 diabetes mellitus with other skin complications (HCC)  Assessment & Plan  Lab Results   Component Value Date    HGBA1C 6.5 (H) 11/26/2023     Recent Labs     05/27/24  1601 05/27/24  2058 05/28/24  0741 05/28/24  1111   POCGLU 147* 170* 154* 191*     Home regimen oral metformin - resume at discharge   A1c well controlled           Medical Problems       Resolved Problems  Date Reviewed: 5/27/2024   None       Discharging Physician / Practitioner: Krystina Gilliam PA-C  PCP: NANY Pollock  Admission Date:   Admission Orders (From admission, onward)       Ordered        05/22/24 1805  INPATIENT ADMISSION  Once                          Discharge Date: 05/28/24    Consultations  During Hospital Stay:  Vascular surgery    Procedures Performed:   Echo:   Interpretation Summary  Show Result Comparison     Left Ventricle: Left ventricular cavity size is normal. Wall thickness is mildly increased. The left ventricular ejection fraction is 70%. Systolic function is vigorous. Wall motion is normal. Diastolic function is mildly abnormal, consistent with grade I (abnormal) relaxation.  There is mild dynamic LVOT obstruction (peak pressure gradient 25 mmHg with valsalva).    Mitral Valve: There is moderate annular calcification.    Tricuspid Valve: There is mild regurgitation.     Findings    Left Ventricle Left ventricular cavity size is normal. Wall thickness is mildly increased. The left ventricular ejection fraction is 70%. Systolic function is vigorous. Wall motion is normal. Diastolic function is mildly abnormal, consistent with grade I (abnormal) relaxation.  There is mild dynamic LVOT obstruction (peak pressure gradient 25 mmHg with valsalva).   Right Ventricle Right ventricular cavity size is normal. Systolic function is normal. Wall thickness is normal.   Left Atrium The atrium is normal in size.   Right Atrium The atrium is normal in size.   Aortic Valve The aortic valve is trileaflet. The leaflets are mildly thickened. The leaflets are not calcified. The leaflets exhibit normal mobility. There is no evidence of regurgitation. The aortic valve has no significant stenosis.   Mitral Valve The leaflets exhibit normal mobility. There is moderate annular calcification.  There is no evidence of regurgitation. There is no evidence of stenosis. The valve has normal function.   Tricuspid Valve Tricuspid valve structure is normal. There is mild regurgitation. There is no evidence of stenosis.   Pulmonic Valve Pulmonic valve structure is normal. There is no evidence of regurgitation. There is no evidence of stenosis.   Ascending Aorta The aortic root is normal in size.   IVC/SVC The inferior vena  cava is normal in size.   Pericardium There is no pericardial effusion. The pericardium is normal in appearance.       Significant Findings / Test Results:   Venous duplex   CONCLUSION:  Impression:  RIGHT LOWER LIMB:  No evidence of acute or chronic deep vein thrombosis.  No evidence of superficial thrombophlebitis noted.  Doppler evaluation shows a normal response to augmentation maneuvers.  Popliteal, posterior tibial and anterior tibial arterial Doppler waveform's are  triphasic/biphasic.     LEFT LOWER LIMB:  No evidence of acute or chronic deep vein thrombosis.  No evidence of superficial thrombophlebitis noted.  Doppler evaluation shows a normal response to augmentation maneuvers.  Popliteal, posterior tibial and anterior tibial arterial Doppler waveform's are  triphasic/biphasic.  CXR  FINDINGS:     Clear lungs. No pneumothorax or pleural effusion.     Normal cardiomediastinal silhouette.     Degenerative changes in the shoulders. Portable loop monitoring device projecting over the left heart.     Normal upper abdomen.     IMPRESSION:     No acute cardiopulmonary disease.    Incidental Findings:   none    Test Results Pending at Discharge (will require follow up):   Recommend BMP on Monday     Outpatient Tests Requested:  Follow-up with vascular surgery, wound care, urology follow-up tomorrow 5/29 and cardiology follow-up 6/18    Complications:      Reason for Admission: Acute on chronic diastolic CHF    Hospital Course:   Mattie Varela is a 79 y.o. female patient who originally presented to the hospital on 5/22/2024 due to complaints of leg swelling; noted to have 21 pound weight gain in the last 3 months.  Diuretics were temporarily held for 2 weeks secondary to urinary incontinence, she does have follow-up with urology tomorrow 5/29..  She was treated with IV diuretics with improvement in volume status.  Her Lasix was increased to 40 mg once daily.  Her Norvasc was discontinued so does not contribute  to leg edema.  She should have a repeat BMP on Monday to evaluate renal function and electrolytes.  She is a follow-up with cardiology 6/18/2024.  She had a venous duplex that was negative for DVT.  She was seen by vascular surgery with chronic venous insufficiency with no evidence of significant arterial insufficiency.  She should continue local wound care, elevation of legs, compression outpatient follow-up with vascular surgery, wound care and cardiology.  She is being discharged to [facility] with [facility]       Please see above list of diagnoses and related plan for additional information.     Condition at Discharge: stable    Discharge Day Visit / Exam:   Subjective: Patient up walking the halls today feels good feels like legs are still stiff for blood improvement.  No chest pain shortness of breath lightheadedness dizziness nausea vomiting fevers chills.  She feels ready to go to [facility] today.    Vitals: Blood Pressure: 111/60 (05/28/24 0736)  Pulse: 71 (05/28/24 0736)  Temperature: 97.5 °F (36.4 °C) (05/28/24 0736)  Temp Source: Temporal (05/28/24 0736)  Respirations: 18 (05/28/24 0736)  Height: 5' (152.4 cm) (05/23/24 1251)  Weight - Scale: 75.5 kg (166 lb 7.2 oz) (05/28/24 0600)  SpO2: 95 % (05/28/24 0736)  Exam:   Physical Exam  Vitals and nursing note reviewed.   Constitutional:       General: She is not in acute distress.     Appearance: She is not ill-appearing.   Cardiovascular:      Rate and Rhythm: Normal rate and regular rhythm.   Pulmonary:      Effort: Pulmonary effort is normal. No respiratory distress.      Comments: Walking in halls without oxygen   Musculoskeletal:      Comments: Walking in halls with walker    Neurological:      Mental Status: She is alert.   Psychiatric:         Mood and Affect: Mood normal.          Discussion with Family: Updated  (daughter) via phone.    Discharge instructions/Information to patient and family:   See after visit summary for information  provided to patient and family.      Provisions for Follow-Up Care:  See after visit summary for information related to follow-up care and any pertinent home health orders.      Mobility at time of Discharge:   Basic Mobility Inpatient Raw Score: 22  JH-HLM Goal: 7: Walk 25 feet or more  JH-HLM Achieved: 6: Walk 10 steps or more  HLM Goal achieved. Continue to encourage appropriate mobility.     Disposition:   Other: abode with [facility]     Planned Readmission: none     Discharge Statement:  I spent 45 minutes discharging the patient. This time was spent on the day of discharge. I had direct contact with the patient on the day of discharge. Greater than 50% of the total time was spent examining patient, answering all patient questions, arranging and discussing plan of care with patient as well as directly providing post-discharge instructions.  Additional time then spent on discharge activities.    Discharge Medications:  See after visit summary for reconciled discharge medications provided to patient and/or family.      **Please Note: This note may have been constructed using a voice recognition system**

## 2024-05-28 NOTE — ASSESSMENT & PLAN NOTE
Home regimen amlodipine 5 mg daily, metoprolol succinate 50 mg twice daily   Stopped norvasc to avoid issues with leg edema   Added lasix 40 mg daily   Low sodium diet   Follow BP trends outpt with PCP to see if need to add back an agent with holding norvasc

## 2024-05-28 NOTE — ASSESSMENT & PLAN NOTE
Complaining of leg pain - right lower leg pain and right calf worse than left, improving with increase in ambualtion and imrpovement in swelling   venous stasis changes  Duplex negative for DVT  Seen by vascular surgery with chronic venous insufficiency, no evidence of significant arterial insufficiency  Recommend to continue local wound care, elevation of legs and compression, outpatient vascular surgery follow-up as well as wound care

## 2024-05-29 ENCOUNTER — PATIENT OUTREACH (OUTPATIENT)
Dept: FAMILY MEDICINE CLINIC | Facility: CLINIC | Age: 79
End: 2024-05-29

## 2024-05-29 ENCOUNTER — TELEPHONE (OUTPATIENT)
Dept: UROLOGY | Facility: CLINIC | Age: 79
End: 2024-05-29

## 2024-05-29 ENCOUNTER — TRANSITIONAL CARE MANAGEMENT (OUTPATIENT)
Dept: FAMILY MEDICINE CLINIC | Facility: CLINIC | Age: 79
End: 2024-05-29

## 2024-05-29 ENCOUNTER — TELEPHONE (OUTPATIENT)
Dept: CARDIOLOGY CLINIC | Facility: CLINIC | Age: 79
End: 2024-05-29

## 2024-05-29 DIAGNOSIS — I50.33 ACUTE ON CHRONIC DIASTOLIC CONGESTIVE HEART FAILURE (HCC): Primary | ICD-10-CM

## 2024-05-29 NOTE — PROGRESS NOTES
CMOC received patient referral from [facility] ARLETTE Underwood.  Patient referred for HF  program.    In basket message from RN, no CMOC needed. [facility] referral will be closed and will remove myself from the care team.

## 2024-05-29 NOTE — PROGRESS NOTES
Mattie returned my call.   Following low sodium diet:   yes  Following fluid restriction:  does not have any listed  Hospital discharge weight:   166.45 pounds     Medications reviewed:  provided by assisted living     Cardiology follow up appointment: message left for patient to schedule     PCP follow up appointment: will see [facility]  Transportation: provided by [facility] health care agency n/a    Mattie reports some vomiting earlier today sipping 7up now and feels better.   She can pick low salt items to eat. Stated she has some kidney tremors because she was not drinking enough   I will check with [facility] about pain medication   Using walker to ambulate. Open to further outreaches

## 2024-05-29 NOTE — TELEPHONE ENCOUNTER
PT WAS SCHEDULED FOR CYSTO BOTOX ON 10/2/24. PT IS MADE AWARE AND ALSO MESSAGE WAS LEFT FOR VICKI([phone])  FROM [facility] REGARDING TRANSPORTATION. VICKI IS TO CALL BACK AND CONFIRM SHE RECEIVED MESSAGE.      NAM: JENNIFFER ABOUT BOTOX FOR 10/2/24

## 2024-05-29 NOTE — TELEPHONE ENCOUNTER
Left message for patient to call office to get update.     Self-management/education and teach back:  Primary learner:   Following low sodium diet:   Following fluid restriction:  Hospital discharge weight: 166 lbs 7.2 oz  Weighing daily:            Recordinst home weight       Weight today:  Monitoring symptoms:   Any current symptoms:   Knows when to call provider:   Medication reviewed and taking all as prescribed (bring medications to follow up visits): lasix 40 mg daily  Knows name of diuretic:  Any labs/studies needed for follow up: BMP 1 week  Escalation plan:   HF education reviewed/reinforced including low sodium diet, 64 oz fluid restriction, activity, symptoms of decompensation and when and who to call.     Care Coordination:  Aware of cardiology follow up appointment: needs to be scheduled  Aware of PCP follow up appointment: needs to be scheduled  Transportation:  Social Support:  Home health care:   Health literacy:  Engagement:  Personal Goal:  Additional comments: compression stockings

## 2024-05-29 NOTE — TELEPHONE ENCOUNTER
Patient missed today's office visit with me to review her urodynamics however it looks like she was just discharged from the hospital yesterday.  I called and reviewed the findings with her by phone    No need to schedule a urodynamics review.  Instead could you please schedule the patient with me for bladder Botox, 100 units next available.  Please make sure this is prior authorized for the visit.  Thank you

## 2024-05-29 NOTE — PROGRESS NOTES
Spoke with Antonella at the health office. Reports they provide patients medications. They can only weigh patient if they have an order to do so.   I asked about low salt diet she just said patients have food choices.   She will provide my phone number to patient for her to call me.

## 2024-05-29 NOTE — TELEPHONE ENCOUNTER
PT WAS SUPPOSE TO BE SEEN TODAY(5/29/24) FOR UDS review/ PTNS. SHE WAS DISCHARGE FROM THE HOSPITAL ON 5/28/24 AND PROBABLY FORGOT ABOUT THE APPT. COULD YOU PLEASE ADVISE ON ANOTHER DATE SINCE THE NEXT AVAILABLE IS WAY OUT.

## 2024-05-29 NOTE — PROGRESS NOTES
Spoke with Maureen at [facility] she saw patient today for wound check I explained patient is asking for pain medication for wounds. Maureen said providers need to look at discharge paper work and then will send orders over. I asked if they could add daily weights she said yes.   I will let Mattie know

## 2024-05-30 ENCOUNTER — PATIENT OUTREACH (OUTPATIENT)
Dept: FAMILY MEDICINE CLINIC | Facility: CLINIC | Age: 79
End: 2024-05-30

## 2024-05-30 ENCOUNTER — TELEPHONE (OUTPATIENT)
Dept: CARDIOLOGY CLINIC | Facility: CLINIC | Age: 79
End: 2024-05-30

## 2024-05-30 ENCOUNTER — TELEPHONE (OUTPATIENT)
Age: 79
End: 2024-05-30

## 2024-05-30 NOTE — TELEPHONE ENCOUNTER
"Per encounter by Dr. Ramirez yesterday -    \"Patient missed today's office visit with me to review her urodynamics however it looks like she was just discharged from the hospital yesterday.  I called and reviewed the findings with her by phone     No need to schedule a urodynamics review.  Instead could you please schedule the patient with me for bladder Botox, 100 units next available.  Please make sure this is prior authorized for the visit.  Thank you\"        Next available is appropriate per MD   "

## 2024-05-30 NOTE — TELEPHONE ENCOUNTER
I have Antonella from Dr. Baires she wants to speak with a nurse regarding procedure for a pt.     Dr. Baires can be reached at [phone]

## 2024-05-30 NOTE — TELEPHONE ENCOUNTER
Antonella from 's office called requesting to speak with a member of the clinical team regarding a procedure for the pt. Call connected with [facility] for assistance.

## 2024-05-30 NOTE — TELEPHONE ENCOUNTER
Spoke to staff from Dr. Baires's office in regards to cystoscopy with botox for October. Please call Dr. Baires directly with a sooner date and time as discussed with the urologist to perform the procedure. Phone number of [phone].

## 2024-05-30 NOTE — TELEPHONE ENCOUNTER
Tried to contact patient left a voicemail for her to call us back and confirm she can come in today t 320 with Dr. Guzman. She is already in spot if she can not take this spot please send her to the [place] office unless another slot opens with the doctor for Friday 5/31

## 2024-05-30 NOTE — PROGRESS NOTES
Mattie returned my call. Reports her whole body feels out of kilter today. She did have breakfast. Did have a pain pill. Informed her she does have a prn pain pill she can ask for per Maureen nurse from [facility].   Informed her she has cardiology appointment today at 3:20pm. She was not aware. I will check back next week with mattie.

## 2024-05-31 ENCOUNTER — PATIENT OUTREACH (OUTPATIENT)
Dept: FAMILY MEDICINE CLINIC | Facility: CLINIC | Age: 79
End: 2024-05-31

## 2024-05-31 ENCOUNTER — HOSPITAL ENCOUNTER (EMERGENCY)
Facility: HOSPITAL | Age: 79
Discharge: HOME | End: 2024-05-31
Attending: EMERGENCY MEDICINE
Payer: MEDICARE

## 2024-05-31 ENCOUNTER — PATIENT OUTREACH (OUTPATIENT)
Dept: CASE MANAGEMENT | Facility: OTHER | Age: 79
End: 2024-05-31

## 2024-05-31 VITALS
DIASTOLIC BLOOD PRESSURE: 64 MMHG | SYSTOLIC BLOOD PRESSURE: 138 MMHG | TEMPERATURE: 97.9 F | OXYGEN SATURATION: 97 % | HEART RATE: 69 BPM | RESPIRATION RATE: 18 BRPM

## 2024-05-31 DIAGNOSIS — E86.0 DEHYDRATION: ICD-10-CM

## 2024-05-31 DIAGNOSIS — R51.9 HEADACHE: Primary | ICD-10-CM

## 2024-05-31 LAB
ALBUMIN SERPL BCP-MCNC: 3.9 G/DL (ref 3.5–5)
ALP SERPL-CCNC: 115 U/L (ref 34–104)
ALT SERPL W P-5'-P-CCNC: 10 U/L (ref 7–52)
ANION GAP SERPL CALCULATED.3IONS-SCNC: 11 MMOL/L (ref 4–13)
AST SERPL W P-5'-P-CCNC: 18 U/L (ref 13–39)
BASOPHILS # BLD AUTO: 0.06 THOUSANDS/ÂΜL (ref 0–0.1)
BASOPHILS NFR BLD AUTO: 1 % (ref 0–1)
BILIRUB SERPL-MCNC: 0.3 MG/DL (ref 0.2–1)
BUN SERPL-MCNC: 23 MG/DL (ref 5–25)
CALCIUM SERPL-MCNC: 9.2 MG/DL (ref 8.4–10.2)
CHLORIDE SERPL-SCNC: 94 MMOL/L (ref 96–108)
CO2 SERPL-SCNC: 25 MMOL/L (ref 21–32)
CREAT SERPL-MCNC: 1.12 MG/DL (ref 0.6–1.3)
EOSINOPHIL # BLD AUTO: 0.38 THOUSAND/ÂΜL (ref 0–0.61)
EOSINOPHIL NFR BLD AUTO: 5 % (ref 0–6)
ERYTHROCYTE [DISTWIDTH] IN BLOOD BY AUTOMATED COUNT: 13.4 % (ref 11.6–15.1)
GFR SERPL CREATININE-BSD FRML MDRD: 46 ML/MIN/1.73SQ M
GLUCOSE SERPL-MCNC: 189 MG/DL (ref 65–140)
HCT VFR BLD AUTO: 36.4 % (ref 34.8–46.1)
HGB BLD-MCNC: 11.4 G/DL (ref 11.5–15.4)
IMM GRANULOCYTES # BLD AUTO: 0.01 THOUSAND/UL (ref 0–0.2)
IMM GRANULOCYTES NFR BLD AUTO: 0 % (ref 0–2)
LYMPHOCYTES # BLD AUTO: 2.59 THOUSANDS/ÂΜL (ref 0.6–4.47)
LYMPHOCYTES NFR BLD AUTO: 36 % (ref 14–44)
MAGNESIUM SERPL-MCNC: 2 MG/DL (ref 1.9–2.7)
MCH RBC QN AUTO: 28.8 PG (ref 26.8–34.3)
MCHC RBC AUTO-ENTMCNC: 31.3 G/DL (ref 31.4–37.4)
MCV RBC AUTO: 92 FL (ref 82–98)
MONOCYTES # BLD AUTO: 0.79 THOUSAND/ÂΜL (ref 0.17–1.22)
MONOCYTES NFR BLD AUTO: 11 % (ref 4–12)
NEUTROPHILS # BLD AUTO: 3.33 THOUSANDS/ÂΜL (ref 1.85–7.62)
NEUTS SEG NFR BLD AUTO: 47 % (ref 43–75)
NRBC BLD AUTO-RTO: 0 /100 WBCS
PLATELET # BLD AUTO: 236 THOUSANDS/UL (ref 149–390)
PMV BLD AUTO: 10.5 FL (ref 8.9–12.7)
POTASSIUM SERPL-SCNC: 4.1 MMOL/L (ref 3.5–5.3)
PROT SERPL-MCNC: 6.7 G/DL (ref 6.4–8.4)
RBC # BLD AUTO: 3.96 MILLION/UL (ref 3.81–5.12)
SODIUM SERPL-SCNC: 130 MMOL/L (ref 135–147)
WBC # BLD AUTO: 7.16 THOUSAND/UL (ref 4.31–10.16)

## 2024-05-31 PROCEDURE — 85025 COMPLETE CBC W/AUTO DIFF WBC: CPT | Performed by: EMERGENCY MEDICINE

## 2024-05-31 PROCEDURE — 83735 ASSAY OF MAGNESIUM: CPT | Performed by: EMERGENCY MEDICINE

## 2024-05-31 PROCEDURE — 96365 THER/PROPH/DIAG IV INF INIT: CPT

## 2024-05-31 PROCEDURE — 80053 COMPREHEN METABOLIC PANEL: CPT | Performed by: EMERGENCY MEDICINE

## 2024-05-31 PROCEDURE — 99285 EMERGENCY DEPT VISIT HI MDM: CPT | Performed by: EMERGENCY MEDICINE

## 2024-05-31 PROCEDURE — 99284 EMERGENCY DEPT VISIT MOD MDM: CPT

## 2024-05-31 PROCEDURE — 36415 COLL VENOUS BLD VENIPUNCTURE: CPT | Performed by: EMERGENCY MEDICINE

## 2024-05-31 RX ORDER — ACETAMINOPHEN 325 MG/1
975 TABLET ORAL ONCE
Status: COMPLETED | OUTPATIENT
Start: 2024-05-31 | End: 2024-05-31

## 2024-05-31 RX ORDER — MAGNESIUM SULFATE HEPTAHYDRATE 40 MG/ML
2 INJECTION, SOLUTION INTRAVENOUS ONCE
Status: COMPLETED | OUTPATIENT
Start: 2024-05-31 | End: 2024-05-31

## 2024-05-31 RX ADMIN — SODIUM CHLORIDE 500 ML: 0.9 INJECTION, SOLUTION INTRAVENOUS at 03:43

## 2024-05-31 RX ADMIN — MAGNESIUM SULFATE HEPTAHYDRATE 2 G: 40 INJECTION, SOLUTION INTRAVENOUS at 03:43

## 2024-05-31 RX ADMIN — ACETAMINOPHEN 975 MG: 325 TABLET, FILM COATED ORAL at 03:40

## 2024-05-31 NOTE — ED PROVIDER NOTES
"History  Chief Complaint   Patient presents with    Headache     Pt arrived via EMS. Pt states \"I am experiencing the symptoms I usually experience when I have kidney failure.\" Pt states HA and tremors started two days ago.  Pt also c/o lightheadedness and N/V. Denies CP and SOB. Pt states she's been in kidney failure for \"over a year\".      Patient is an 79-year-old female that arrives via ambulance.  She states that for the past 2 days she has been having headaches, tremors and nausea.  States that she has had the symptoms in the past with kidney failure.  Patient is concerned for kidney failure tonight.  She states that she has been trying to hydrate at home but has not been able to adequately control her symptoms like she usually can with oral hydration.  States that she was not able to sleep tonight due to the persistent headache and tremors.  Patient denies any chest pain, shortness of breath, fevers, chills or worsening leg swelling from her recent admission.          Prior to Admission Medications   Prescriptions Last Dose Informant Patient Reported? Taking?   Blood Glucose Monitoring Suppl (OneTouch Verio Reflect) w/Device KIT  Self No No   Sig: Check blood sugars twice daily. Please substitute with appropriate alternative as covered by patient's insurance. Dx: E11.65   MAGNESIUM OXIDE 400 PO   Yes No   Sig: Take 400 mg by mouth 2 (two) times a day   Milnacipran HCl (Savella) 100 MG TABS  Self No No   Sig: Take 1 tablet (100 mg total) by mouth daily   OneTouch Delica Lancets 33G MISC  Self No No   Sig: Check blood sugars twice daily. Please substitute with appropriate alternative as covered by patient's insurance. Dx: E11.65   albuterol (PROVENTIL HFA,VENTOLIN HFA) 90 mcg/act inhaler  Self No No   Sig: Inhale 2 puffs every 6 (six) hours as needed for wheezing or shortness of breath   aspirin 81 mg chewable tablet  Self Yes No   Sig: Chew 81 mg daily   atorvastatin (LIPITOR) 40 mg tablet  Self No No   Sig: " TAKE ONE TABLET BY MOUTH ONCE DAILY   baclofen 10 mg tablet  Self Yes No   Sig: Take 10 mg by mouth 2 (two) times a day as needed for muscle spasms   clopidogrel (Plavix) 75 mg tablet   No No   Sig: Take 1 tablet (75 mg total) by mouth daily   ferrous sulfate 325 (65 Fe) mg tablet  Self No No   Sig: Take 1 tablet (325 mg total) by mouth daily with breakfast   furosemide (LASIX) 20 mg tablet   No No   Sig: Take 2 tablets (40 mg total) by mouth daily   glucose blood (OneTouch Verio) test strip  Self No No   Sig: Check blood sugars twice daily. Please substitute with appropriate alternative as covered by patient's insurance. Dx: E11.65   latanoprost (XALATAN) 0.005 % ophthalmic solution   Yes No   Si drop daily at bedtime   levothyroxine 75 mcg tablet   No No   Sig: Take 0.5 tablets (37.5 mcg total) by mouth daily in the early morning   Patient taking differently: Take 25 mcg by mouth daily in the early morning   meclizine (ANTIVERT) 25 mg tablet   No No   Sig: Take 1 tablet (25 mg total) by mouth 4 (four) times a day as needed for dizziness   metFORMIN (GLUCOPHAGE) 1000 MG tablet  Self No No   Sig: Take 1 tablet (1,000 mg total) by mouth 2 (two) times a day with meals   Patient taking differently: Take 500 mg by mouth 2 (two) times a day with meals   metoprolol succinate (TOPROL-XL) 50 mg 24 hr tablet  Self No No   Sig: Take 1 tablet (50 mg total) by mouth every 12 (twelve) hours   oxyCODONE-acetaminophen (PERCOCET)  mg per tablet  Self Yes No   Sig: Take 1 tablet by mouth every 6 (six) hours as needed for moderate pain   pantoprazole (PROTONIX) 40 mg tablet   No No   Sig: Take 1 tablet (40 mg total) by mouth daily   senna-docusate sodium (SENOKOT-S) 8.6-50 mg per tablet   Yes No   Sig: Take 2 tablets by mouth if needed for constipation   trospium (SANCTURA XR) 60 mg 24 hr capsule  Self No No   Sig: Take 1 capsule (60 mg total) by mouth daily before breakfast      Facility-Administered Medications: None        Past Medical History:   Diagnosis Date    Acid reflux     Acute kidney injury (HCC)     Anemia     hx of iron-deficient    Anxiety     Arthritis     Asthma     last needed inhaler last year 2020    Basal cell carcinoma     upper lip    Chronic narcotic dependence (HCC)     Chronic pain     Colon polyp     Cystocele     Diabetes mellitus (HCC)     stable    Disease of thyroid gland     hypothyroidism    Diverticulosis     Dizziness     at times    Dysfunctional uterine bleeding     last assessed - [id number]    Dysphagia     Fibromyalgia     Gastric ulcer     Gastroparesis     History of colonic polyps     last assessed - [id number]    History of gastroesophageal reflux (GERD)     Hypercholesterolemia     Hyperlipidemia     Hypertension     Hyponatremia     IBS (irritable bowel syndrome)     Pneumobilia 06/18/2022    Post laminectomy syndrome     S/P insertion of spinal cord stimulator 07/18/2018    s/p Medtronic loop recorder 3/2/2024 03/02/2024    Seasonal allergies     Shortness of breath     exertional    Spinal stenosis     Status post lumbar spinal fusion 03/16/2018    Stroke ([facility])     pt states slight stroke March 2022       Past Surgical History:   Procedure Laterality Date    APPENDECTOMY      BACK SURGERY      BREAST CYST EXCISION Left     CARDIAC CATHETERIZATION  11/14/2023    Procedure: Cardiac catheterization;  Surgeon: Randolph Holt MD;  [facility];  Service: Cardiology    CARDIAC CATHETERIZATION N/A 11/14/2023    Procedure: Cardiac Coronary Angiogram;  Surgeon: Randolph Holt MD;  [facility];  Service: Cardiology    CARDIAC ELECTROPHYSIOLOGY PROCEDURE N/A 3/2/2024    Procedure: Cardiac loop recorder implant;  Surgeon: Edu Fontaine MD;  [facility];  Service: Cardiology    CHOLECYSTECTOMY      COLONOSCOPY      ESOPHAGOGASTRODUODENOSCOPY N/A 09/28/2016    Procedure: ESOPHAGOGASTRODUODENOSCOPY (EGD);  Surgeon: Mylene Moeller MD;   Location: [facility] GI [facility];  Service:     HERNIA REPAIR      HYSTERECTOMY      TTAH-BSO age 28    LAMINECTOMY      LUMBAR LAMINECTOMY      OOPHORECTOMY      age 28    DC ARTHRODESIS POSTERIOR/PSTLAT TQ 1NTRSPC THORACIC N/A 06/04/2018    Procedure: Reopening of lumbar incision for T12-L5 posterior instrumented fixation and fusion and T12-L4 posterior decompression;  Surgeon: Wood Rogel MD;  Location: [facility];  Service: Neurosurgery    DC COLONOSCOPY FLX DX W/COLLJ SPEC WHEN PFRMD N/A 03/02/2016    Procedure: EGD AND COLONOSCOPY;  Surgeon: Mylene Moeller MD;  [facility]: [facility];  Service: Gastroenterology    DC DILATION ESOPH UNGUIDED SOUND/BOUGIE 1/MULT PASS N/A 09/28/2016    Procedure: DILATATION ESOPHAGEAL;  Surgeon: Mylene Moeller MD;  [facility];  Service: Gastroenterology    DC ESOPHAGOGASTRODUODENOSCOPY TRANSORAL DIAGNOSTIC N/A 07/18/2016    Procedure: ESOPHAGOGASTRODUODENOSCOPY (EGD);  Surgeon: Mylene Moeller MD;  [facility];  Service: Gastroenterology    DC INSJ/RPLCMT SPINAL NPG/RCVR POCKET CRTJ&CONNJ Left 06/04/2018    Procedure: removal of left buttock implantable pulse generator and placement of new  implantable pulse generator;  Surgeon: Wood Rogel MD;  [facility];  Service: Neurosurgery       Family History   Problem Relation Age of Onset    Lung cancer Mother 46    Pulmonary embolism Father     No Known Problems Sister     No Known Problems Daughter     No Known Problems Daughter     Stroke Maternal Grandmother     Heart attack Maternal Grandfather     No Known Problems Paternal Grandmother     No Known Problems Paternal Grandfather     No Known Problems Maternal Aunt     Diabetes Family         Diabetes mellitus    Hypertension Family     Stroke Family         Stroke complications     I have reviewed and agree with the history as documented.    E-Cigarette/Vaping     E-Cigarette/Vaping Substances    Nicotine No     THC No     CBD No     Flavoring No     Other No      Unknown No      Social History     Tobacco Use    Smoking status: Former     Current packs/day: 0.00     Types: Cigarettes     Quit date: 1970     Years since quittin.4    Smokeless tobacco: Never    Tobacco comments:     Denied history of current ever day smoker, Former smoker and Never smoker all documented in [employer]   Substance Use Topics    Alcohol use: Not Currently     Comment: Denied history of alcohol use    Drug use: No     Comment: Denied history of drug use       Review of Systems   Constitutional:  Positive for fatigue. Negative for chills and fever.   HENT:  Negative for congestion, facial swelling and rhinorrhea.    Eyes:  Negative for photophobia, discharge and visual disturbance.   Respiratory:  Negative for cough.    Cardiovascular:  Negative for chest pain.   Gastrointestinal:  Positive for nausea. Negative for vomiting.   Skin:  Negative for color change and rash.   Allergic/Immunologic: Negative for immunocompromised state.   Neurological:  Positive for tremors and headaches. Negative for syncope, speech difficulty and weakness.   All other systems reviewed and are negative.      Physical Exam  Physical Exam  Vitals and nursing note reviewed.   Constitutional:       General: She is not in acute distress.     Appearance: She is well-developed. She is not ill-appearing, toxic-appearing or diaphoretic.   HENT:      Head: Normocephalic and atraumatic.      Right Ear: External ear normal.      Left Ear: External ear normal.      Nose: Nose normal. No congestion or rhinorrhea.      Mouth/Throat:      Mouth: Oropharynx is clear and moist.   Eyes:      General: No scleral icterus.        Right eye: No discharge.         Left eye: No discharge.      Conjunctiva/sclera: Conjunctivae normal.      Pupils: Pupils are equal, round, and reactive to light.   Neck:      Trachea: No tracheal deviation.   Cardiovascular:      Rate and Rhythm: Normal rate and regular rhythm.      Heart sounds: Normal  heart sounds. No murmur heard.     No friction rub.   Pulmonary:      Effort: Pulmonary effort is normal. No tachypnea, accessory muscle usage or respiratory distress.      Breath sounds: Normal breath sounds. No stridor. No wheezing or rales.   Abdominal:      General: There is no distension.      Palpations: Abdomen is soft.      Tenderness: There is no abdominal tenderness. There is no guarding or rebound.   Musculoskeletal:         General: No tenderness or deformity. Normal range of motion.      Cervical back: Normal range of motion and neck supple.      Right lower le+ Pitting Edema present.      Left lower le+ Pitting Edema present.   Skin:     General: Skin is warm and dry.   Neurological:      General: No focal deficit present.      Mental Status: She is alert and oriented to person, place, and time. She is not disoriented.      Gait: Gait normal.   Psychiatric:         Mood and Affect: Mood and affect normal.         Speech: Speech normal.         Behavior: Behavior normal.         Vital Signs  ED Triage Vitals   Temperature Pulse Respirations Blood Pressure SpO2   24 031   97.9 °F (36.6 °C) 70 18 (!) 198/81 93 %      Temp Source Heart Rate Source Patient Position - Orthostatic VS BP Location FiO2 (%)   24 03124 0310 24 03124 031 --   Oral Monitor Lying Right arm       Pain Score       24 0340       7           Vitals:    24 0310 24 0400   BP: (!) 198/81 159/74   Pulse: 70 66   Patient Position - Orthostatic VS: Lying Lying         Visual Acuity      ED Medications  Medications   sodium chloride 0.9 % bolus 500 mL (500 mL Intravenous New Bag 24 0343)   magnesium sulfate 2 g/50 mL IVPB (premix) 2 g (2 g Intravenous New Bag 24)   acetaminophen (TYLENOL) tablet 975 mg (975 mg Oral Given 24)       Diagnostic Studies  Results Reviewed       Procedure Component Value  Units Date/Time    Comprehensive metabolic panel [[id number]]  (Abnormal) Collected: 05/31/24 0331    Lab Status: Final result Specimen: Blood from Arm, Right Updated: 05/31/24 0354     Sodium 130 mmol/L      Potassium 4.1 mmol/L      Chloride 94 mmol/L      CO2 25 mmol/L      ANION GAP 11 mmol/L      BUN 23 mg/dL      Creatinine 1.12 mg/dL      Glucose 189 mg/dL      Calcium 9.2 mg/dL      AST 18 U/L      ALT 10 U/L      Alkaline Phosphatase 115 U/L      Total Protein 6.7 g/dL      Albumin 3.9 g/dL      Total Bilirubin 0.30 mg/dL      eGFR 46 ml/min/1.73sq m     Narrative:      National Kidney Disease Foundation guidelines for Chronic Kidney Disease (CKD):     Stage 1 with normal or high GFR (GFR > 90 mL/min/1.73 square meters)    Stage 2 Mild CKD (GFR = 60-89 mL/min/1.73 square meters)    Stage 3A Moderate CKD (GFR = 45-59 mL/min/1.73 square meters)    Stage 3B Moderate CKD (GFR = 30-44 mL/min/1.73 square meters)    Stage 4 Severe CKD (GFR = 15-29 mL/min/1.73 square meters)    Stage 5 End Stage CKD (GFR <15 mL/min/1.73 square meters)  Note: GFR calculation is accurate only with a steady state creatinine    Magnesium [[id number]]  (Normal) Collected: 05/31/24 0331    Lab Status: Final result Specimen: Blood from Arm, Right Updated: 05/31/24 0353     Magnesium 2.0 mg/dL     CBC and differential [[id number]]  (Abnormal) Collected: 05/31/24 0331    Lab Status: Final result Specimen: Blood from Arm, Right Updated: 05/31/24 0337     WBC 7.16 Thousand/uL      RBC 3.96 Million/uL      Hemoglobin 11.4 g/dL      Hematocrit 36.4 %      MCV 92 fL      MCH 28.8 pg      MCHC 31.3 g/dL      RDW 13.4 %      MPV 10.5 fL      Platelets 236 Thousands/uL      nRBC 0 /100 WBCs      Segmented % 47 %      Immature Grans % 0 %      Lymphocytes % 36 %      Monocytes % 11 %      Eosinophils Relative 5 %      Basophils Relative 1 %      Absolute Neutrophils 3.33 Thousands/µL      Absolute Immature Grans 0.01 Thousand/uL      Absolute  Lymphocytes 2.59 Thousands/µL      Absolute Monocytes 0.79 Thousand/µL      Eosinophils Absolute 0.38 Thousand/µL      Basophils Absolute 0.06 Thousands/µL                    No orders to display              Procedures  Procedures         ED Course  ED Course as of 05/31/24 0431   Fri May 31, 2024   0427 Comprehensive metabolic panel(!)  Hyponatremia and hypochloremia.  Creatinine and GFR at baseline   0428 CBC and differential(!)  Chronic anemia at baseline   0428 Magnesium  Normal                               SBIRT 22yo+      Flowsheet Row Most Recent Value   Initial Alcohol Screen: US AUDIT-C     1. How often do you have a drink containing alcohol? 0 Filed at: 05/31/2024 0317   2. How many drinks containing alcohol do you have on a typical day you are drinking?  0 Filed at: 05/31/2024 0317   3b. FEMALE Any Age, or MALE 65+: How often do you have 4 or more drinks on one occassion? 0 Filed at: 05/31/2024 0317   Audit-C Score 0 Filed at: 05/31/2024 0317   DUNCAN: How many times in the past year have you...    Used an illegal drug or used a prescription medication for non-medical reasons? Never Filed at: 05/31/2024 0317                      Medical Decision Making  Patient appears well on exam.  No active tremors.  She has no focal neurologic deficit.  Looking through her records, the patient has a baseline creatinine of around 1.3.  She was diuresed during her last admission and lost almost 20 pounds according to her.  Possible that there is electrolyte shifting with an SADAF causing her symptoms.  She states that normally she feels better with fluids.  Will give her a small bolus of normal saline because she does have a history of CHF.  Will also give her Tylenol and magnesium for her symptoms.  Blood pressure is elevated but she has no chest pain or shortness of breath at this time.  Will continue to monitor this.  She is neurologically intact without deficits so I do not feel that she needs a CT at this  time.    4:28 AM  Labs are overall unremarkable.  Mild hyponatremia and hypochloremia which is probably from her recent diuresis.  Small amount of IV fluids given already which would treat this.  Plan is for discharge with PCP follow-up if symptoms are persisting.  I do not feel that she needs repeat labs since this could be explained by the diuresis unless she has persistent symptoms for which again I will have her follow-up with her PCP.  Discussed return to ER precautions.  Patient stable for discharge.    Amount and/or Complexity of Data Reviewed  Labs: ordered. Decision-making details documented in ED Course.    Risk  OTC drugs.  Prescription drug management.             Disposition  Final diagnoses:   Headache   Dehydration     Time reflects when diagnosis was documented in both MDM as applicable and the Disposition within this note       Time User Action Codes Description Comment    5/31/2024  4:29 AM Eric Kennedy [R51.9] Headache     5/31/2024  4:29 AM Eric Kennedy [E86.0] Dehydration           ED Disposition       ED Disposition   Discharge    Condition   Stable    Date/Time   Fri May 31, 2024 0429    Comment   Mattie Varela discharge to home/self care.                   Follow-up Information       Follow up With Specialties Details Why Contact Info Additional Information    [facility], Nurse Practitioner Call  If symptoms persist, To schedule an appointment as soon as you can [address]  [address]  [facility] [address] [phone]       [facility] Emergency Department Emergency Medicine Go to  If symptoms worsen [address] [id number] [phone] [facility] Emergency Department, [address] [place] [address], [address]            Patient's Medications   Discharge Prescriptions    No medications on file       No discharge procedures on file.    PDMP Review         Value Time User    PDMP  Reviewed  Yes 5/23/2024  2:12 AM NANY Miller            ED Provider  Electronically Signed by             Eric Kennedy Jr.,   05/31/24 [id number]

## 2024-05-31 NOTE — ED NOTES
[facility]ia [facility], [facility] will be picking up pt at 1100 on 5/31/2024.     Jose Galvan RN  05/31/24 0593

## 2024-05-31 NOTE — UTILIZATION REVIEW
NOTIFICATION OF ADMISSION DISCHARGE   This is a Notification of Discharge from [facility]. Please be advised that this patient has been discharge from our facility. Below you will find the admission and discharge date and time including the patient’s disposition.   UTILIZATION REVIEW CONTACT:  Lorraine Carter MA  Utilization   Network Utilization Review Department  Phone: [phone] x carefully listen to the prompts. All voicemails are confidential.  Email: [email or web address]     ADMISSION INFORMATION  PRESENTATION DATE: 5/22/2024  2:51 PM  OBERVATION ADMISSION DATE:   INPATIENT ADMISSION DATE: 5/22/24  6:05 PM   DISCHARGE DATE: 5/28/2024 12:52 PM   DISPOSITION:Home/Self Care    Network Utilization Review Department  ATTENTION: Please call with any questions or concerns to [phone] and carefully listen to the prompts so that you are directed to the right person. All voicemails are confidential.   For Discharge needs, contact Care Management DC Support Team at [phone] opt. 2  Send all requests for admission clinical reviews, approved or denied determinations and any other requests to dedicated fax number below belonging to the campus where the patient is receiving treatment. List of dedicated fax numbers for the Facilities:  FACILITY NAME UR FAX NUMBER   ADMISSION DENIALS (Administrative/Medical Necessity) [phone]   DISCHARGE SUPPORT TEAM ([facility]) [phone]   PARENT CHILD HEALTH (Maternity/NICU/Pediatrics) [phone]   [facility] [phone]   [facility] [phone]   [facility] [phone]   [facility] [phone]   [facility] [phone]   [facility] [phone]   [facility] [phone]   [facility]  [phone]   [facility] [phone]   [facility] [phone]   [facility] [phone]   [facility] [phone]

## 2024-05-31 NOTE — ED NOTES
Patient rang call bell to request pain medication for right leg pain. MD TT and made aware.      Olivia Romero, RN  05/31/24 0867

## 2024-05-31 NOTE — TELEPHONE ENCOUNTER
Discussed with Dr. Ramirez. For now will keep appt as scheduled and will ensure patient is on the waitlist

## 2024-05-31 NOTE — PROGRESS NOTES
ADT in basket message patient is currently in the emergency room at [facility] for headache Will monitor for discharge.

## 2024-05-31 NOTE — PROGRESS NOTES
ADT alert received in error.    CMOC remained on patient care team in error. Error corrected and CMOC removed from the care team.

## 2024-06-03 ENCOUNTER — TELEPHONE (OUTPATIENT)
Dept: CARDIOLOGY CLINIC | Facility: CLINIC | Age: 79
End: 2024-06-03

## 2024-06-03 ENCOUNTER — PATIENT OUTREACH (OUTPATIENT)
Dept: FAMILY MEDICINE CLINIC | Facility: CLINIC | Age: 79
End: 2024-06-03

## 2024-06-03 ENCOUNTER — HOSPITAL ENCOUNTER (INPATIENT)
Facility: HOSPITAL | Age: 79
LOS: 1 days | Discharge: NURSING, REHAB OR LONG-TERM CARE | DRG: 683 | End: 2024-06-05
Attending: EMERGENCY MEDICINE | Admitting: INTERNAL MEDICINE
Payer: MEDICARE

## 2024-06-03 DIAGNOSIS — N17.9 AKI (ACUTE KIDNEY INJURY) (HCC): Primary | ICD-10-CM

## 2024-06-03 DIAGNOSIS — Z79.899 POLYPHARMACY: ICD-10-CM

## 2024-06-03 DIAGNOSIS — R60.0 LOWER EXTREMITY EDEMA: ICD-10-CM

## 2024-06-03 DIAGNOSIS — E86.0 DEHYDRATION: ICD-10-CM

## 2024-06-03 DIAGNOSIS — K59.00 CONSTIPATION, UNSPECIFIED CONSTIPATION TYPE: Chronic | ICD-10-CM

## 2024-06-03 LAB
ANION GAP SERPL CALCULATED.3IONS-SCNC: 3 MMOL/L (ref 4–13)
BASOPHILS # BLD AUTO: 0.05 THOUSANDS/ÂΜL (ref 0–0.1)
BASOPHILS NFR BLD AUTO: 1 % (ref 0–1)
BILIRUB UR QL STRIP: NEGATIVE
BUN SERPL-MCNC: 26 MG/DL (ref 5–25)
CALCIUM SERPL-MCNC: 8.9 MG/DL (ref 8.4–10.2)
CHLORIDE SERPL-SCNC: 100 MMOL/L (ref 96–108)
CLARITY UR: CLEAR
CO2 SERPL-SCNC: 31 MMOL/L (ref 21–32)
COLOR UR: YELLOW
CREAT SERPL-MCNC: 1.48 MG/DL (ref 0.6–1.3)
EOSINOPHIL # BLD AUTO: 0.47 THOUSAND/ÂΜL (ref 0–0.61)
EOSINOPHIL NFR BLD AUTO: 7 % (ref 0–6)
ERYTHROCYTE [DISTWIDTH] IN BLOOD BY AUTOMATED COUNT: 13.3 % (ref 11.6–15.1)
GFR SERPL CREATININE-BSD FRML MDRD: 32 ML/MIN/1.73SQ M
GLUCOSE SERPL-MCNC: 120 MG/DL (ref 65–140)
GLUCOSE UR STRIP-MCNC: NEGATIVE MG/DL
HCT VFR BLD AUTO: 32.2 % (ref 34.8–46.1)
HGB BLD-MCNC: 9.8 G/DL (ref 11.5–15.4)
HGB UR QL STRIP.AUTO: NEGATIVE
IMM GRANULOCYTES # BLD AUTO: 0.02 THOUSAND/UL (ref 0–0.2)
IMM GRANULOCYTES NFR BLD AUTO: 0 % (ref 0–2)
KETONES UR STRIP-MCNC: NEGATIVE MG/DL
LEUKOCYTE ESTERASE UR QL STRIP: NEGATIVE
LYMPHOCYTES # BLD AUTO: 1.91 THOUSANDS/ÂΜL (ref 0.6–4.47)
LYMPHOCYTES NFR BLD AUTO: 27 % (ref 14–44)
MCH RBC QN AUTO: 27.9 PG (ref 26.8–34.3)
MCHC RBC AUTO-ENTMCNC: 30.4 G/DL (ref 31.4–37.4)
MCV RBC AUTO: 92 FL (ref 82–98)
MONOCYTES # BLD AUTO: 0.76 THOUSAND/ÂΜL (ref 0.17–1.22)
MONOCYTES NFR BLD AUTO: 11 % (ref 4–12)
NEUTROPHILS # BLD AUTO: 3.76 THOUSANDS/ÂΜL (ref 1.85–7.62)
NEUTS SEG NFR BLD AUTO: 54 % (ref 43–75)
NITRITE UR QL STRIP: NEGATIVE
NRBC BLD AUTO-RTO: 0 /100 WBCS
PH UR STRIP.AUTO: 5.5 [PH] (ref 4.5–8)
PLATELET # BLD AUTO: 226 THOUSANDS/UL (ref 149–390)
PMV BLD AUTO: 10.2 FL (ref 8.9–12.7)
POTASSIUM SERPL-SCNC: 4.8 MMOL/L (ref 3.5–5.3)
PROT UR STRIP-MCNC: NEGATIVE MG/DL
RBC # BLD AUTO: 3.51 MILLION/UL (ref 3.81–5.12)
SODIUM SERPL-SCNC: 134 MMOL/L (ref 135–147)
SP GR UR STRIP.AUTO: <=1.005 (ref 1–1.03)
UROBILINOGEN UR QL STRIP.AUTO: 0.2 E.U./DL
WBC # BLD AUTO: 6.97 THOUSAND/UL (ref 4.31–10.16)

## 2024-06-03 PROCEDURE — 80048 BASIC METABOLIC PNL TOTAL CA: CPT

## 2024-06-03 PROCEDURE — 96365 THER/PROPH/DIAG IV INF INIT: CPT

## 2024-06-03 PROCEDURE — 85025 COMPLETE CBC W/AUTO DIFF WBC: CPT

## 2024-06-03 PROCEDURE — 99222 1ST HOSP IP/OBS MODERATE 55: CPT | Performed by: INTERNAL MEDICINE

## 2024-06-03 PROCEDURE — 81003 URINALYSIS AUTO W/O SCOPE: CPT

## 2024-06-03 PROCEDURE — 99285 EMERGENCY DEPT VISIT HI MDM: CPT | Performed by: EMERGENCY MEDICINE

## 2024-06-03 PROCEDURE — 99285 EMERGENCY DEPT VISIT HI MDM: CPT

## 2024-06-03 PROCEDURE — 36415 COLL VENOUS BLD VENIPUNCTURE: CPT

## 2024-06-03 RX ORDER — PANTOPRAZOLE SODIUM 40 MG/1
40 TABLET, DELAYED RELEASE ORAL
Status: DISCONTINUED | OUTPATIENT
Start: 2024-06-04 | End: 2024-06-05 | Stop reason: HOSPADM

## 2024-06-03 RX ORDER — ATORVASTATIN CALCIUM 40 MG/1
40 TABLET, FILM COATED ORAL
Status: DISCONTINUED | OUTPATIENT
Start: 2024-06-04 | End: 2024-06-05 | Stop reason: HOSPADM

## 2024-06-03 RX ORDER — OXYCODONE HYDROCHLORIDE AND ACETAMINOPHEN 5; 325 MG/1; MG/1
1 TABLET ORAL EVERY 12 HOURS PRN
Status: DISCONTINUED | OUTPATIENT
Start: 2024-06-03 | End: 2024-06-05 | Stop reason: HOSPADM

## 2024-06-03 RX ORDER — AMOXICILLIN 250 MG
2 CAPSULE ORAL 2 TIMES DAILY PRN
Status: DISCONTINUED | OUTPATIENT
Start: 2024-06-03 | End: 2024-06-04

## 2024-06-03 RX ORDER — SODIUM CHLORIDE, SODIUM GLUCONATE, SODIUM ACETATE, POTASSIUM CHLORIDE, MAGNESIUM CHLORIDE, SODIUM PHOSPHATE, DIBASIC, AND POTASSIUM PHOSPHATE .53; .5; .37; .037; .03; .012; .00082 G/100ML; G/100ML; G/100ML; G/100ML; G/100ML; G/100ML; G/100ML
500 INJECTION, SOLUTION INTRAVENOUS ONCE
Status: COMPLETED | OUTPATIENT
Start: 2024-06-03 | End: 2024-06-03

## 2024-06-03 RX ORDER — LANOLIN ALCOHOL/MO/W.PET/CERES
400 CREAM (GRAM) TOPICAL 2 TIMES DAILY
Status: DISCONTINUED | OUTPATIENT
Start: 2024-06-04 | End: 2024-06-05 | Stop reason: HOSPADM

## 2024-06-03 RX ORDER — HEPARIN SODIUM 5000 [USP'U]/ML
5000 INJECTION, SOLUTION INTRAVENOUS; SUBCUTANEOUS EVERY 8 HOURS SCHEDULED
Status: DISCONTINUED | OUTPATIENT
Start: 2024-06-03 | End: 2024-06-05 | Stop reason: HOSPADM

## 2024-06-03 RX ORDER — ACETAMINOPHEN 325 MG/1
650 TABLET ORAL ONCE
Status: COMPLETED | OUTPATIENT
Start: 2024-06-03 | End: 2024-06-03

## 2024-06-03 RX ORDER — METOPROLOL SUCCINATE 50 MG/1
50 TABLET, EXTENDED RELEASE ORAL EVERY 12 HOURS SCHEDULED
Status: DISCONTINUED | OUTPATIENT
Start: 2024-06-04 | End: 2024-06-05 | Stop reason: HOSPADM

## 2024-06-03 RX ORDER — FERROUS SULFATE 325(65) MG
325 TABLET ORAL
Status: DISCONTINUED | OUTPATIENT
Start: 2024-06-04 | End: 2024-06-05 | Stop reason: HOSPADM

## 2024-06-03 RX ORDER — BACLOFEN 10 MG/1
10 TABLET ORAL 2 TIMES DAILY PRN
Status: DISCONTINUED | OUTPATIENT
Start: 2024-06-03 | End: 2024-06-05 | Stop reason: HOSPADM

## 2024-06-03 RX ADMIN — SODIUM CHLORIDE, SODIUM GLUCONATE, SODIUM ACETATE, POTASSIUM CHLORIDE, MAGNESIUM CHLORIDE, SODIUM PHOSPHATE, DIBASIC, AND POTASSIUM PHOSPHATE 500 ML: .53; .5; .37; .037; .03; .012; .00082 INJECTION, SOLUTION INTRAVENOUS at 19:08

## 2024-06-03 RX ADMIN — ACETAMINOPHEN 325MG 650 MG: 325 TABLET ORAL at 19:34

## 2024-06-03 RX ADMIN — HEPARIN SODIUM 5000 UNITS: 5000 INJECTION INTRAVENOUS; SUBCUTANEOUS at 23:18

## 2024-06-03 NOTE — TELEPHONE ENCOUNTER
Called patient to schedule a HF HFU she informed me she can only do a virtual at the moment [facility] and they would need at least a week to get her in. She was able to take the 3PM to day with ash if it was virtual.

## 2024-06-03 NOTE — ED ATTENDING ATTESTATION
6/3/2024  I, Baldomero Vega MD, saw and evaluated the patient. I have discussed the patient with the resident/non-physician practitioner and agree with the resident's/non-physician practitioner's findings, Plan of Care, and MDM as documented in the resident's/non-physician practitioner's note, except where noted. All available labs and Radiology studies were reviewed.  I was present for key portions of any procedure(s) performed by the resident/non-physician practitioner and I was immediately available to provide assistance.       At this point I agree with the current assessment done in the Emergency Department.  I have conducted an independent evaluation of this patient a history and physical is as follows:  Patient is an 80 yo female, HTN, DM, Chornic LE edema, c/o upper and lower extremity tremors, says she gets these when she gets dehydrated, seen for similar symptoms recently, given IVF and was discharged. On exam, initially patient appeared drowsy, however answers appropriately, no focal neurodeficits, pupils equal round reactive to light, lungs clear, heart sounds regular, abdomen soft nontender, peripheral edema noted. Patient was noted to walk to the bathroom using walker without assistance; upon re-evaluation patient appeared more alert and oriented. Differential diagnosis: Possible dehydration, electrolyte derangement, anemia, we will check labs.    ED Course     Workup pending, case discussed with Dr. Huddleston in sign out.    Critical Care Time  Procedures

## 2024-06-03 NOTE — CASE MANAGEMENT
Case Management ED Discharge Planning Note    Patient name Mattie Varela  Location ED-12/ED-12 MRN [id number]  : 1945 Date 6/3/2024        OBJECTIVE:  Predictive Model Details          92%  Factor Value    Risk of Hospital Admission or ED Visit Model 29% Number of ED Visits 2     24% Number of Hospitalizations 5+     9% Is in Relationship No     9% Has Chronic Kidney Disease Yes     6% Has COPD Yes     6% Has Anemia Yes     5% Has CVD Yes     5% Has Diabetes Yes     4% Has Asthma Yes     3% Has CHF Yes     1% Has PCP Yes            Chief Complaint: Tremors of nervous system .  Patient Class: Emergency  Preferred Pharmacy:   [id number] - [place] - [address]  [address] [facility] [address]  Phone: [phone] Fax: [phone]    [id number] - [place] - [address] [address]  Phone: [phone] Fax: [phone]    [id number] - [place] - [address] [address]  Phone: [phone] Fax: [phone]    [id number] [place] - [address]  [address] [facility] [address]  Phone: [phone] Fax: [phone]    [facility] [address]  [facility] [address]  [place] [address]  Phone: [phone] Fax: [phone]    [facility] [place] [facility] [address]  [facility],  [address]  [facility],  First Floor [facility] [address]  Phone: [phone] Fax: [phone]    Primary Care Provider: NANY Pollock    Primary Insurance: [employer] [facility]  Secondary Insurance:     ED Discharge Details:    Discharge planning discussed with:: Pt           Other Referral/Resources/Interventions Provided:  Interventions: None Indicated                      Additional Comments: Introduced self and role at bedside given ED readmission score. Discussed  recent admission. After discussion no unmet social or medical needs identified. Pt current with [facility] and is living at [facility]. Pt also followed by [facility]. CM available through dc.

## 2024-06-03 NOTE — PROGRESS NOTES
Spoke with Mattie she is currently drinking Gatorade to increase her electrolytes. States she has arm and leg tremors from her kidneys. Instructed to drink Gatorade  by [facility] who was in last week to see her.   To see cardiologist at 3pm today.   She states the staff does not weigh her daily.   Uses walker to ambulate.   Appetite good.   Asked to end the call so she could continue to drink her Gatorade. I will check back next week.

## 2024-06-03 NOTE — CASE MANAGEMENT
Case Management ED Assessment    Patient name Mattie Varela  Location ED-12/ED-12 MRN [id number]  : 1945 Date 6/3/2024        OBJECTIVE:  Predictive Model Details   Calculating. Refresh or re-add the SmartLink.           Chief Complaint: Tremors of nervous system .  Patient Class: Emergency  Preferred Pharmacy:   [id number] - [place], [place] - [address] [facility]  [address] [facility] [address]  Phone: [phone] Fax: [phone]    [id number] - [place] - [address] [address]  Phone: [phone] Fax: [phone]    [facility] - [place] - [address] [address]  Phone: [phone] Fax: [phone]    [id number] [place] - [address]  [address] [facility] [address]  Phone: [phone] Fax: [phone]    [facility] [address]  [address]  [address] [place] [address]  Phone: [phone] Fax: [phone]    [facility] [place] - [address]  [facility],  [address]  [facility],  First Floor [facility] [address]  Phone: [phone] Fax: [phone]    Primary Care Provider: NANY Pollock    Primary Insurance: [employer] [facility]  Secondary Insurance:     ED ASSESSMENT:  Active Health Care Proxies       Venice B[facility] Care Representative - Daughter   Primary Phone: [phone] (Mobile)  Home Phone: [phone]                      Readmission Root Cause  30 Day Readmission: Yes  Who directed you to return to the hospital?: Self  Did you understand whom to contact if you had questions or problems?: Yes  Did you get your prescriptions before you left the hospital?: No (Stated they did not rx anything)  Did you take your medications as prescribed?: No (States she is not on meds, chart review confirms at this time)  Were  you able to get to your follow-up appointments?: No  Reason:: Transportation  During previous admission, was a post-acute recommendation made?: No    Outpatient Care Information  Have you seen a doctor in the last year?: Yes    Prescribed Medications Prior to Admission/ED Visit  Has patient been prescribed medications (prior to this ED visit/admission)?: No    Patient Information  Admitted from:: Facility  Mental Status: Confused (Delayed speech with some confusion)  During Assessment patient was accompanied by: Not accompanied during assessment  Assessment information provided by:: Patient  Primary Caregiver: Other (Comment) (Corinne)  Support Systems: Self, Jewish/cassandra community, Daughter  County of Residence: [place]  What [facility] do you live in?: [place]  Home entry access options. Select all that apply.: No steps to enter home  Type of Current Residence: Facility  Upon entering residence, is there a bedroom on the main floor (no further steps)?: Yes  Upon entering residence, is there a bathroom on the main floor (no further steps)?: Yes  Living Arrangements: Other (Comment) (Corinne PINEDA)  Is patient a ?: No    Patient Information Continued  Income Source: SSI/SSD  Does patient have prescription coverage?: Yes  Does patient receive dialysis treatments?: No  Does patient have a history of substance abuse?: No  Does patient have a history of Mental Health Diagnosis?: No    Food and Transportation  What is patient's usual means of transportation?: None (Corinne)  Ask patient: How would you describe your eating habits?: Fair

## 2024-06-03 NOTE — ED PROVIDER NOTES
History  Chief Complaint   Patient presents with    Tremors     Pt BIB EMS from [facility]. Pt reports tremors occur when she is dehydrated. Pt has slight tremor to both hands. Pt recently seen for same complaint.     HPI  79-year-old female with history of hypertension, hyperlipidemia, chronic pain, history of stroke, hypothyroidism, heart failure presents to the ED from [facility] for tremors which started this afternoon.  She states that she started having tremors in her upper and lower extremities and this has happened before, most recently on 5/31 and she thinks it is triggered by dehydration.  She states that she is told to drink a certain amount of water each day but does not drink enough because she falls asleep.  Currently she is complaining of the tremors and nausea.  She also endorses decreased urine output.  She tells me that she is going to have her level of care upgraded at [facility] due to this recurrent problem.  Case management is following.  She denies headache, vision changes, dizziness, chest pain, shortness of breath, palpitations, abdominal pain, vomiting, diarrhea, hematuria, dysuria.    Prior to Admission Medications   Prescriptions Last Dose Informant Patient Reported? Taking?   Blood Glucose Monitoring Suppl (OneTouch Verio Reflect) w/Device KIT Unknown Self No No   Sig: Check blood sugars twice daily. Please substitute with appropriate alternative as covered by patient's insurance. Dx: E11.65   MAGNESIUM OXIDE 400 PO   Yes Yes   Sig: Take 400 mg by mouth 2 (two) times a day   Milnacipran HCl (Savella) 100 MG TABS  Self No Yes   Sig: Take 1 tablet (100 mg total) by mouth daily   OneTouch Delica Lancets 33G MISC Unknown Self No No   Sig: Check blood sugars twice daily. Please substitute with appropriate alternative as covered by patient's insurance. Dx: E11.65   albuterol (PROVENTIL HFA,VENTOLIN HFA) 90 mcg/act inhaler Unknown Self No No   Sig: Inhale 2 puffs every 6 (six) hours as needed for  wheezing or shortness of breath   aspirin 81 mg chewable tablet Unknown Self Yes No   Sig: Chew 81 mg daily   atorvastatin (LIPITOR) 40 mg tablet  Self No Yes   Sig: TAKE ONE TABLET BY MOUTH ONCE DAILY   Patient taking differently: Take 40 mg by mouth daily   baclofen 10 mg tablet  Self Yes Yes   Sig: Take 10 mg by mouth 2 (two) times a day as needed for muscle spasms   clopidogrel (Plavix) 75 mg tablet   No No   Sig: Take 1 tablet (75 mg total) by mouth daily   ferrous sulfate 325 (65 Fe) mg tablet  Self No Yes   Sig: Take 1 tablet (325 mg total) by mouth daily with breakfast   furosemide (LASIX) 20 mg tablet   No Yes   Sig: Take 2 tablets (40 mg total) by mouth daily   Patient taking differently: Take 20 mg by mouth 2 (two) times a day   glucose blood (OneTouch Verio) test strip Unknown Self No No   Sig: Check blood sugars twice daily. Please substitute with appropriate alternative as covered by patient's insurance. Dx: E11.65   latanoprost (XALATAN) 0.005 % ophthalmic solution   Yes Yes   Sig: Administer 1 drop to both eyes daily at bedtime   levothyroxine 75 mcg tablet   No Yes   Sig: Take 0.5 tablets (37.5 mcg total) by mouth daily in the early morning   Patient taking differently: Take 25 mcg by mouth daily in the early morning Take 1 and 1/2 tablet every morning   meclizine (ANTIVERT) 25 mg tablet   No No   Sig: Take 1 tablet (25 mg total) by mouth 4 (four) times a day as needed for dizziness   Patient taking differently: Take 25 mg by mouth daily as needed for dizziness   metFORMIN (GLUCOPHAGE) 1000 MG tablet  Self No Yes   Sig: Take 1 tablet (1,000 mg total) by mouth 2 (two) times a day with meals   Patient taking differently: Take 500 mg by mouth 2 (two) times a day with meals   metoprolol succinate (TOPROL-XL) 50 mg 24 hr tablet  Self No Yes   Sig: Take 1 tablet (50 mg total) by mouth every 12 (twelve) hours   oxyCODONE-acetaminophen (PERCOCET)  mg per tablet  Self Yes Yes   Sig: Take 1 tablet by  mouth 2 (two) times a day   pantoprazole (PROTONIX) 40 mg tablet   No Yes   Sig: Take 1 tablet (40 mg total) by mouth daily   senna-docusate sodium (SENOKOT-S) 8.6-50 mg per tablet   Yes Yes   Sig: Take 2 tablets by mouth if needed for constipation   trospium (SANCTURA XR) 60 mg 24 hr capsule  Self No No   Sig: Take 1 capsule (60 mg total) by mouth daily before breakfast      Facility-Administered Medications: None       Past Medical History:   Diagnosis Date    Acid reflux     Acute kidney injury (HCC)     Anemia     hx of iron-deficient    Anxiety     Arthritis     Asthma     last needed inhaler last year 2020    Basal cell carcinoma     upper lip    Chronic narcotic dependence (HCC)     Chronic pain     Colon polyp     Cystocele     Diabetes mellitus (HCC)     stable    Disease of thyroid gland     hypothyroidism    Diverticulosis     Dizziness     at times    Dysfunctional uterine bleeding     last assessed - 07May2014    Dysphagia     Fibromyalgia     Gastric ulcer     Gastroparesis     History of colonic polyps     last assessed - 09Feb2016    History of gastroesophageal reflux (GERD)     Hypercholesterolemia     Hyperlipidemia     Hypertension     Hyponatremia     IBS (irritable bowel syndrome)     Pneumobilia 06/18/2022    Post laminectomy syndrome     S/P insertion of spinal cord stimulator 07/18/2018    s/p Medtronic loop recorder 3/2/2024 03/02/2024    Seasonal allergies     Shortness of breath     exertional    Spinal stenosis     Status post lumbar spinal fusion 03/16/2018    Stroke (HCC)     pt states slight stroke March 2022       Past Surgical History:   Procedure Laterality Date    APPENDECTOMY      BACK SURGERY      BREAST CYST EXCISION Left     CARDIAC CATHETERIZATION  11/14/2023    Procedure: Cardiac catheterization;  Surgeon: Randolph Holt MD;  [facility];  Service: Cardiology    CARDIAC CATHETERIZATION N/A 11/14/2023    Procedure: Cardiac Coronary Angiogram;  Surgeon:  Randolph Holt MD;  [facility];  Service: Cardiology    CARDIAC ELECTROPHYSIOLOGY PROCEDURE N/A 3/2/2024    Procedure: Cardiac loop recorder implant;  Surgeon: Edu Fontaine MD;  Location: [facility];  Service: Cardiology    CHOLECYSTECTOMY      COLONOSCOPY      ESOPHAGOGASTRODUODENOSCOPY N/A 09/28/2016    Procedure: ESOPHAGOGASTRODUODENOSCOPY (EGD);  Surgeon: Mylene Moeller MD;  Location: [facility] GI LAB;  Service:     HERNIA REPAIR      HYSTERECTOMY      TTAH-BSO age 28    LAMINECTOMY      LUMBAR LAMINECTOMY      OOPHORECTOMY      age 28    KY ARTHRODESIS POSTERIOR/PSTLAT TQ 1NTRSPC THORACIC N/A 06/04/2018    Procedure: Reopening of lumbar incision for T12-L5 posterior instrumented fixation and fusion and T12-L4 posterior decompression;  Surgeon: Wood Rogel MD;  Location: [facility] MAIN [facility];  Service: Neurosurgery    KY COLONOSCOPY FLX DX W/COLLJ SPEC WHEN PFRMD N/A 03/02/2016    Procedure: EGD AND COLONOSCOPY;  Surgeon: Mylene Moeller MD;  Location: [facility] GI [facility];  Service: Gastroenterology    KY DILATION ESOPH UNGUIDED SOUND/BOUGIE 1/MULT PASS N/A 09/28/2016    Procedure: DILATATION ESOPHAGEAL;  Surgeon: Mylene Moeller MD;  Location: [facility] GI [facility];  Service: Gastroenterology    KY ESOPHAGOGASTRODUODENOSCOPY TRANSORAL DIAGNOSTIC N/A 07/18/2016    Procedure: ESOPHAGOGASTRODUODENOSCOPY (EGD);  Surgeon: Mylene Moeller MD;  Location: [facility];  Service: Gastroenterology    KY INSJ/RPLCMT SPINAL NPG/RCVR POCKET CRTJ&CONNJ Left 06/04/2018    Procedure: removal of left buttock implantable pulse generator and placement of new  implantable pulse generator;  Surgeon: Wood Rogel MD;  Location: [facility];  Service: Neurosurgery       Family History   Problem Relation Age of Onset    Lung cancer Mother 46    Pulmonary embolism Father     No Known Problems Sister     No Known Problems Daughter     No Known Problems Daughter     Stroke Maternal Grandmother     Heart attack Maternal Grandfather     No Known  Problems Paternal Grandmother     No Known Problems Paternal Grandfather     No Known Problems Maternal Aunt     Diabetes Family         Diabetes mellitus    Hypertension Family     Stroke Family         Stroke complications     I have reviewed and agree with the history as documented.    E-Cigarette/Vaping     E-Cigarette/Vaping Substances    Nicotine No     THC No     CBD No     Flavoring No     Other No     Unknown No      Social History     Tobacco Use    Smoking status: Former     Current packs/day: 0.00     Types: Cigarettes     Quit date: 1970     Years since quittin.4    Smokeless tobacco: Never    Tobacco comments:     Denied history of current ever day smoker, Former smoker and Never smoker all documented in [employer]   Substance Use Topics    Alcohol use: Not Currently     Comment: Denied history of alcohol use    Drug use: No     Comment: Denied history of drug use        Review of Systems  See HPI    Physical Exam  ED Triage Vitals   Temperature Pulse Respirations Blood Pressure SpO2   24 1740 24 1740 24 1740 24 1740 24 174   97.8 °F (36.6 °C) 68 18 131/60 97 %      Temp Source Heart Rate Source Patient Position - Orthostatic VS BP Location FiO2 (%)   24 1740 24 1740 24 1740 24 174 --   Oral Monitor Lying Right arm       Pain Score       24 1934       8             Orthostatic Vital Signs  Vitals:    240 24 1954 24   BP: 131/60 (!) 188/80 158/68   Pulse: 68 69 68   Patient Position - Orthostatic VS: Lying Sitting Sitting       Physical Exam  Constitutional:       General: She is not in acute distress.     Appearance: She is not toxic-appearing.   HENT:      Head: Normocephalic.      Mouth/Throat:      Mouth: Mucous membranes are dry.      Pharynx: Oropharynx is clear.   Eyes:      Extraocular Movements: Extraocular movements intact.   Cardiovascular:      Rate and Rhythm: Normal rate and regular rhythm.       Pulses: Normal pulses.      Heart sounds: Normal heart sounds.   Pulmonary:      Effort: Pulmonary effort is normal.      Breath sounds: Normal breath sounds.   Abdominal:      General: There is no distension.      Palpations: Abdomen is soft.      Tenderness: There is no abdominal tenderness.   Musculoskeletal:      Right lower leg: Edema present.      Left lower leg: Edema present.   Skin:     General: Skin is warm and dry.      Capillary Refill: Capillary refill takes more than 3 seconds.      Comments: There is some mild erythema of the bilateral anterior lower legs without excess warmth, tenderness.  There is evidence of healed wounds bilaterally in the same area but again without signs of infection any drainage, warmth, tenderness, significant redness.   Neurological:      General: No focal deficit present.      Mental Status: She is oriented to person, place, and time.      Comments: Patient has no notable tremor of either the upper or lower extremities on exam.  Patient is drowsy but opens eyes to voice and remains alert for duration of interview and exam.         ED Medications  Medications   multi-electrolyte (ISOLYTE-S PH 7.4) bolus 500 mL (0 mL Intravenous Stopped 6/3/24 2008)   acetaminophen (TYLENOL) tablet 650 mg (650 mg Oral Given 6/3/24 1934)       Diagnostic Studies  Results Reviewed       Procedure Component Value Units Date/Time    Urine Macroscopic, POC [[id number]] Collected: 06/03/24 1951    Lab Status: Final result Specimen: Urine Updated: 06/03/24 1953     Color, UA Yellow     Clarity, UA Clear     pH, UA 5.5     Leukocytes, UA Negative     Nitrite, UA Negative     Protein, UA Negative mg/dl      Glucose, UA Negative mg/dl      Ketones, UA Negative mg/dl      Urobilinogen, UA 0.2 E.U./dl      Bilirubin, UA Negative     Occult Blood, UA Negative     Specific Gravity, UA <=1.005    Narrative:      CLINITEK RESULT    Basic metabolic panel [[id number]]  (Abnormal) Collected: 06/03/24 1907    Lab  Status: Final result Specimen: Blood from Arm, Left Updated: 06/03/24 1930     Sodium 134 mmol/L      Potassium 4.8 mmol/L      Chloride 100 mmol/L      CO2 31 mmol/L      ANION GAP 3 mmol/L      BUN 26 mg/dL      Creatinine 1.48 mg/dL      Glucose 120 mg/dL      Calcium 8.9 mg/dL      eGFR 32 ml/min/1.73sq m     Narrative:      National Kidney Disease Foundation guidelines for Chronic Kidney Disease (CKD):     Stage 1 with normal or high GFR (GFR > 90 mL/min/1.73 square meters)    Stage 2 Mild CKD (GFR = 60-89 mL/min/1.73 square meters)    Stage 3A Moderate CKD (GFR = 45-59 mL/min/1.73 square meters)    Stage 3B Moderate CKD (GFR = 30-44 mL/min/1.73 square meters)    Stage 4 Severe CKD (GFR = 15-29 mL/min/1.73 square meters)    Stage 5 End Stage CKD (GFR <15 mL/min/1.73 square meters)  Note: GFR calculation is accurate only with a steady state creatinine    CBC and differential [[id number]]  (Abnormal) Collected: 06/03/24 1907    Lab Status: Final result Specimen: Blood from Arm, Left Updated: 06/03/24 1915     WBC 6.97 Thousand/uL      RBC 3.51 Million/uL      Hemoglobin 9.8 g/dL      Hematocrit 32.2 %      MCV 92 fL      MCH 27.9 pg      MCHC 30.4 g/dL      RDW 13.3 %      MPV 10.2 fL      Platelets 226 Thousands/uL      nRBC 0 /100 WBCs      Segmented % 54 %      Immature Grans % 0 %      Lymphocytes % 27 %      Monocytes % 11 %      Eosinophils Relative 7 %      Basophils Relative 1 %      Absolute Neutrophils 3.76 Thousands/µL      Absolute Immature Grans 0.02 Thousand/uL      Absolute Lymphocytes 1.91 Thousands/µL      Absolute Monocytes 0.76 Thousand/µL      Eosinophils Absolute 0.47 Thousand/µL      Basophils Absolute 0.05 Thousands/µL                    No orders to display         Procedures  Procedures      ED Course  ED Course as of 06/03/24 2055 Mon Jun 03, 2024 1924 CBC and differential(!)  Slightly worsened anemia.  No leukocytosis or thrombocytopenia.   1939 Basic metabolic panel(!)  Borderline  but improved hyponatremia.  SADAF.   2054 Discussed case with GREG who agrees to place patient in observation.                             SBIRT 22yo+      Flowsheet Row Most Recent Value   Initial Alcohol Screen: US AUDIT-C     1. How often do you have a drink containing alcohol? 0 Filed at: 06/03/2024 1741   2. How many drinks containing alcohol do you have on a typical day you are drinking?  0 Filed at: 06/03/2024 1741   3b. FEMALE Any Age, or MALE 65+: How often do you have 4 or more drinks on one occassion? 0 Filed at: 06/03/2024 1741   Audit-C Score 0 Filed at: 06/03/2024 1741   DUNCAN: How many times in the past year have you...    Used an illegal drug or used a prescription medication for non-medical reasons? Never Filed at: 06/03/2024 1741                  Medical Decision Making  79-year-old female presents with decreased fluid intake, tremors, nausea.  Patient also endorses decreased urine output.  Denies headache, vision changes, dizziness, chest pain, shortness of breath, palpitations, abdominal pain, vomiting, diarrhea, hematuria, dysuria.  Patient with normal vital signs on presentation.  Patient is initially drowsy but opens eyes to voice and remains alert for duration of interview and exam.  Patient has dry mucous membranes.  Heart sounds are normal with regular and rhythm.  Lungs clear to auscultation.  Abdomen is benign.  Distal pulses are equal and intact.  Capillary refill is significantly delayed.  There is bilateral lower extremity edema with some overlying mild redness.  There is evidence of bilateral well-healed wounds without any drainage or excess erythema, warmth, tenderness.  Concern for dehydration, SADAF, anemia, polypharmacy.  I will get CBC, BMP, UA and give small fluid bolus.    Amount and/or Complexity of Data Reviewed  Labs: ordered. Decision-making details documented in ED Course.    Risk  OTC drugs.  Prescription drug management.  Decision regarding  hospitalization.          Disposition  Final diagnoses:   SADAF (acute kidney injury) (HCC)   Dehydration   Lower extremity edema   Polypharmacy     Time reflects when diagnosis was documented in both MDM as applicable and the Disposition within this note       Time User Action Codes Description Comment    6/3/2024  7:39 PM Edu Rasmussen [N17.9] SADAF (acute kidney injury) (HCC)     6/3/2024  7:40 PM Edu Rasmussen [E86.0] Dehydration     6/3/2024  7:40 PM Edu Rasmussen [R60.0] Lower extremity edema     6/3/2024  7:58 PM Edu Rasmussen [Z79.899] Polypharmacy           ED Disposition       ED Disposition   Admit    Condition   Stable    Date/Time   Mon Oimd 3, 2024 2043    Comment                  Follow-up Information    None         Patient's Medications   Discharge Prescriptions    No medications on file     No discharge procedures on file.    PDMP Review         Value Time User    PDMP Reviewed  Yes 5/23/2024  2:12 AM NANY Miller             ED Provider  Attending physically available and evaluated Mattie Varela. I managed the patient along with the ED Attending.    Electronically Signed by           Edu Rasmussen DO  06/03/24 2055

## 2024-06-04 ENCOUNTER — PATIENT OUTREACH (OUTPATIENT)
Dept: FAMILY MEDICINE CLINIC | Facility: CLINIC | Age: 79
End: 2024-06-04

## 2024-06-04 PROBLEM — R60.0 LOWER EXTREMITY EDEMA: Status: ACTIVE | Noted: 2023-02-23

## 2024-06-04 PROBLEM — R25.1 TREMOR: Status: RESOLVED | Noted: 2023-11-28 | Resolved: 2024-06-04

## 2024-06-04 PROBLEM — R11.0 NAUSEA: Status: ACTIVE | Noted: 2024-06-04

## 2024-06-04 LAB
ANION GAP SERPL CALCULATED.3IONS-SCNC: 6 MMOL/L (ref 4–13)
BUN SERPL-MCNC: 24 MG/DL (ref 5–25)
CALCIUM SERPL-MCNC: 9.3 MG/DL (ref 8.4–10.2)
CHLORIDE SERPL-SCNC: 102 MMOL/L (ref 96–108)
CO2 SERPL-SCNC: 33 MMOL/L (ref 21–32)
CREAT SERPL-MCNC: 1.17 MG/DL (ref 0.6–1.3)
GFR SERPL CREATININE-BSD FRML MDRD: 43 ML/MIN/1.73SQ M
GLUCOSE SERPL-MCNC: 150 MG/DL (ref 65–140)
POTASSIUM SERPL-SCNC: 4.8 MMOL/L (ref 3.5–5.3)
SODIUM SERPL-SCNC: 141 MMOL/L (ref 135–147)

## 2024-06-04 PROCEDURE — 99232 SBSQ HOSP IP/OBS MODERATE 35: CPT

## 2024-06-04 PROCEDURE — 80048 BASIC METABOLIC PNL TOTAL CA: CPT | Performed by: INTERNAL MEDICINE

## 2024-06-04 RX ORDER — CLOPIDOGREL BISULFATE 75 MG/1
75 TABLET ORAL DAILY
Status: DISCONTINUED | OUTPATIENT
Start: 2024-06-04 | End: 2024-06-05 | Stop reason: HOSPADM

## 2024-06-04 RX ORDER — ALBUTEROL SULFATE 90 UG/1
2 AEROSOL, METERED RESPIRATORY (INHALATION) EVERY 6 HOURS PRN
Status: DISCONTINUED | OUTPATIENT
Start: 2024-06-04 | End: 2024-06-05 | Stop reason: HOSPADM

## 2024-06-04 RX ORDER — ONDANSETRON 2 MG/ML
4 INJECTION INTRAMUSCULAR; INTRAVENOUS EVERY 6 HOURS PRN
Status: DISCONTINUED | OUTPATIENT
Start: 2024-06-04 | End: 2024-06-05 | Stop reason: HOSPADM

## 2024-06-04 RX ORDER — FUROSEMIDE 20 MG/1
20 TABLET ORAL DAILY
Status: DISCONTINUED | OUTPATIENT
Start: 2024-06-05 | End: 2024-06-05 | Stop reason: HOSPADM

## 2024-06-04 RX ORDER — LATANOPROST 50 UG/ML
1 SOLUTION/ DROPS OPHTHALMIC
Status: DISCONTINUED | OUTPATIENT
Start: 2024-06-04 | End: 2024-06-05 | Stop reason: HOSPADM

## 2024-06-04 RX ORDER — OXYBUTYNIN CHLORIDE 10 MG/1
10 TABLET, EXTENDED RELEASE ORAL DAILY
Status: DISCONTINUED | OUTPATIENT
Start: 2024-06-04 | End: 2024-06-05 | Stop reason: HOSPADM

## 2024-06-04 RX ORDER — ACETAMINOPHEN 325 MG/1
650 TABLET ORAL EVERY 6 HOURS PRN
Status: DISCONTINUED | OUTPATIENT
Start: 2024-06-04 | End: 2024-06-05 | Stop reason: HOSPADM

## 2024-06-04 RX ORDER — POLYETHYLENE GLYCOL 3350 17 G/17G
17 POWDER, FOR SOLUTION ORAL DAILY PRN
Status: DISCONTINUED | OUTPATIENT
Start: 2024-06-04 | End: 2024-06-05

## 2024-06-04 RX ORDER — DOCUSATE SODIUM 100 MG/1
100 CAPSULE, LIQUID FILLED ORAL 2 TIMES DAILY
Status: DISCONTINUED | OUTPATIENT
Start: 2024-06-04 | End: 2024-06-05 | Stop reason: HOSPADM

## 2024-06-04 RX ORDER — SENNOSIDES 8.6 MG
2 TABLET ORAL
Status: DISCONTINUED | OUTPATIENT
Start: 2024-06-04 | End: 2024-06-05 | Stop reason: HOSPADM

## 2024-06-04 RX ORDER — ASPIRIN 81 MG/1
81 TABLET, CHEWABLE ORAL DAILY
Status: DISCONTINUED | OUTPATIENT
Start: 2024-06-04 | End: 2024-06-05 | Stop reason: HOSPADM

## 2024-06-04 RX ADMIN — METOPROLOL SUCCINATE 50 MG: 50 TABLET, EXTENDED RELEASE ORAL at 20:00

## 2024-06-04 RX ADMIN — METOPROLOL SUCCINATE 50 MG: 50 TABLET, EXTENDED RELEASE ORAL at 00:21

## 2024-06-04 RX ADMIN — FERROUS SULFATE TAB 325 MG (65 MG ELEMENTAL FE) 325 MG: 325 (65 FE) TAB at 08:23

## 2024-06-04 RX ADMIN — DOCUSATE SODIUM 100 MG: 100 CAPSULE, LIQUID FILLED ORAL at 11:13

## 2024-06-04 RX ADMIN — OXYCODONE HYDROCHLORIDE AND ACETAMINOPHEN 1 TABLET: 5; 325 TABLET ORAL at 00:21

## 2024-06-04 RX ADMIN — METOPROLOL SUCCINATE 50 MG: 50 TABLET, EXTENDED RELEASE ORAL at 08:23

## 2024-06-04 RX ADMIN — ATORVASTATIN CALCIUM 40 MG: 40 TABLET, FILM COATED ORAL at 17:20

## 2024-06-04 RX ADMIN — HEPARIN SODIUM 5000 UNITS: 5000 INJECTION INTRAVENOUS; SUBCUTANEOUS at 15:10

## 2024-06-04 RX ADMIN — LATANOPROST 1 DROP: 50 SOLUTION OPHTHALMIC at 21:19

## 2024-06-04 RX ADMIN — CLOPIDOGREL BISULFATE 75 MG: 75 TABLET ORAL at 08:23

## 2024-06-04 RX ADMIN — OXYCODONE HYDROCHLORIDE AND ACETAMINOPHEN 1 TABLET: 5; 325 TABLET ORAL at 19:56

## 2024-06-04 RX ADMIN — DOCUSATE SODIUM 100 MG: 100 CAPSULE, LIQUID FILLED ORAL at 17:20

## 2024-06-04 RX ADMIN — HEPARIN SODIUM 5000 UNITS: 5000 INJECTION INTRAVENOUS; SUBCUTANEOUS at 21:19

## 2024-06-04 RX ADMIN — HEPARIN SODIUM 5000 UNITS: 5000 INJECTION INTRAVENOUS; SUBCUTANEOUS at 06:00

## 2024-06-04 RX ADMIN — SENNOSIDES 17.2 MG: 8.6 TABLET, FILM COATED ORAL at 21:19

## 2024-06-04 RX ADMIN — BACLOFEN 10 MG: 10 TABLET ORAL at 15:10

## 2024-06-04 RX ADMIN — MAGNESIUM OXIDE TAB 400 MG (241.3 MG ELEMENTAL MG) 400 MG: 400 (241.3 MG) TAB at 17:20

## 2024-06-04 RX ADMIN — ACETAMINOPHEN 650 MG: 325 TABLET, FILM COATED ORAL at 11:13

## 2024-06-04 RX ADMIN — OXYBUTYNIN CHLORIDE 10 MG: 10 TABLET, EXTENDED RELEASE ORAL at 08:23

## 2024-06-04 RX ADMIN — ASPIRIN 81 MG CHEWABLE TABLET 81 MG: 81 TABLET CHEWABLE at 08:23

## 2024-06-04 RX ADMIN — PANTOPRAZOLE SODIUM 40 MG: 40 TABLET, DELAYED RELEASE ORAL at 06:00

## 2024-06-04 RX ADMIN — MAGNESIUM OXIDE TAB 400 MG (241.3 MG ELEMENTAL MG) 400 MG: 400 (241.3 MG) TAB at 08:23

## 2024-06-04 NOTE — PROGRESS NOTES
[facility]  Progress Note  Name: Mattie Varela I MRN: [id number]  Unit/Bed#: E4 -01I Date of Admission: 6/3/2024   Date of Service: 6/3/2024  I Hospital Day: 0    * SADAF (acute kidney injury) (HCC)  Assessment & Plan  SADAF on CKD 3A  Baseline creatinine: 0.8-1.1  Creatinine on admission: 1.48, peak  Potentially from overdiuresis/dehydration  S/p 500 mL IVF bolus in ED  Creatinine improved  Restart lasix 6/5 at decreased dosage of 20 mg daily  Continue to monitor renal function daily  Daily weights    Lab Results   Component Value Date/Time    CREATININE 1.17 06/04/2024 10:40 AM    CREATININE 1.48 (H) 06/03/2024 07:07 PM        Lower extremity edema  Assessment & Plan  Has chronic edema due to heart failure and venous insufficiency  Continue with leg elevation, appreciate nursing care   Chronic wounds non infected, wound care consulted    Cerebrovascular accident (CVA), unspecified mechanism (HCC)  Assessment & Plan  Continue with aspirin, Plavix and statin    Mild intermittent asthma without complication  Assessment & Plan  Albuterol as needed    Overactive bladder  Assessment & Plan  Takes [facility] as outpatient, receiving oxybutynin as substitute inpatient  Follows with outpatient Urology      Iron deficiency anemia secondary to inadequate dietary iron intake  Assessment & Plan  Hemoglobin is currently around her baseline  Continue with iron supplement      Tremor-resolved as of 6/4/2024  Assessment & Plan  Per patient.  No gross tremor noted on examination today  Could be secondary to metabolic derangements in the setting of SADAF/dehydration  Patient has a spinal stimulator??  If tremor does not resolve this may need to be investigated further.  Will monitor for now  Treat SADAF as above  Fall precautions  6/4 No tremor seen, patient says it has resolved          VTE Pharmacologic Prophylaxis: VTE Score: 4 Moderate Risk (Score 3-4) - Pharmacological DVT Prophylaxis Ordered:  heparin.    Mobility:   Basic Mobility Inpatient Raw Score: 15  JH-HLM Goal: 4: Move to chair/commode  JH-HLM Achieved: 1: Laying in bed  JH-HLM Goal NOT achieved. Continue with multidisciplinary rounding and encourage appropriate mobility to improve upon JH-HLM goals.    Patient Centered Rounds: I performed bedside rounds with nursing staff today. ARLETTE Hodge  Discussions with Specialists or Other Care Team Provider: Case managment    Education and Discussions with Family / Patient: Patient declined call to .     Total Time Spent on Date of Encounter in care of patient: 50 mins. This time was spent on one or more of the following: performing physical exam; counseling and coordination of care; obtaining or reviewing history; documenting in the medical record; reviewing/ordering tests, medications or procedures; communicating with other healthcare professionals and discussing with patient's family/caregivers.    Current Length of Stay: 0 day(s)  Current Patient Status: Inpatient   Certification Statement: The patient will continue to require additional inpatient hospital stay due to resolving SADAF  Discharge Plan: Anticipate discharge tomorrow to prior assisted or independent living facility.    Code Status: Level 1 - Full Code    Subjective:   Patient sitting in bed eating breakfast.  She has headache which she says occurs sometimes when she hasn't eaten.  She did not get lunch last night so feels very hungry.  She has no other pain.  She feels the tremors have resolved after getting the fluids yesterday.  She says the problem is that she doesn't sleep well at night because she has to get up every 30-45 minutes to urinate.  She has an appointment with Dr Ramirez of urology in October for a bladder botox injection.  She said she then sleeps during the day and does not eat/drink enough.    Objective:     Vitals:   Temp (24hrs), Av.3 °F (36.3 °C), Min:97.1 °F (36.2 °C), Max:97.8 °F (36.6  °C)    Temp:  [97.1 °F (36.2 °C)-97.8 °F (36.6 °C)] 97.1 °F (36.2 °C)  HR:  [68-76] 71  Resp:  [16-18] 18  BP: (131-188)/(59-80) 137/59  SpO2:  [97 %] 97 %  Body mass index is 35.52 kg/m².     Input and Output Summary (last 24 hours):     Intake/Output Summary (Last 24 hours) at 6/4/2024 1451  Last data filed at 6/4/2024 1349  Gross per 24 hour   Intake 480 ml   Output 2050 ml   Net -1570 ml       Physical Exam:   Physical Exam  Vitals and nursing note reviewed.   Constitutional:       Appearance: She is obese.   HENT:      Head: Normocephalic.      Comments: Headache     Nose: Nose normal.      Mouth/Throat:      Mouth: Mucous membranes are moist.      Pharynx: Oropharynx is clear.   Eyes:      Conjunctiva/sclera: Conjunctivae normal.   Cardiovascular:      Rate and Rhythm: Normal rate and regular rhythm.      Pulses: Normal pulses.      Heart sounds: Normal heart sounds. No murmur heard.  Pulmonary:      Effort: Pulmonary effort is normal. No respiratory distress.      Breath sounds: Normal breath sounds. No wheezing or rhonchi.   Abdominal:      General: Bowel sounds are normal. There is no distension.      Tenderness: There is no abdominal tenderness. There is no guarding.   Musculoskeletal:         General: Normal range of motion.      Cervical back: Normal range of motion.      Right lower leg: No edema.      Left lower leg: No edema.   Skin:     General: Skin is warm and dry.      Comments: Bilateral shins wrapped with ace wrap   Neurological:      General: No focal deficit present.      Mental Status: She is alert and oriented to person, place, and time. Mental status is at baseline.          Additional Data:     Labs:  Results from last 7 days   Lab Units 06/03/24  1907   WBC Thousand/uL 6.97   HEMOGLOBIN g/dL 9.8*   HEMATOCRIT % 32.2*   PLATELETS Thousands/uL 226   SEGS PCT % 54   LYMPHO PCT % 27   MONO PCT % 11   EOS PCT % 7*     Results from last 7 days   Lab Units 06/04/24  1040 06/03/24  1907  05/31/24  0331   SODIUM mmol/L 141   < > 130*   POTASSIUM mmol/L 4.8   < > 4.1   CHLORIDE mmol/L 102   < > 94*   CO2 mmol/L 33*   < > 25   BUN mg/dL 24   < > 23   CREATININE mg/dL 1.17   < > 1.12   ANION GAP mmol/L 6   < > 11   CALCIUM mg/dL 9.3   < > 9.2   ALBUMIN g/dL  --   --  3.9   TOTAL BILIRUBIN mg/dL  --   --  0.30   ALK PHOS U/L  --   --  115*   ALT U/L  --   --  10   AST U/L  --   --  18   GLUCOSE RANDOM mg/dL 150*   < > 189*    < > = values in this interval not displayed.                         Lines/Drains:  Invasive Devices       Peripheral Intravenous Line  Duration             Peripheral IV 06/03/24 Left Antecubital <1 day                          Imaging: No pertinent imaging reviewed.    Recent Cultures (last 7 days):         Last 24 Hours Medication List:   Current Facility-Administered Medications   Medication Dose Route Frequency Provider Last Rate    acetaminophen  650 mg Oral Q6H PRN NANY Ruby      albuterol  2 puff Inhalation Q6H PRN Jessie Guadarrama MD      aspirin  81 mg Oral Daily Jessie Guadarrama MD      atorvastatin  40 mg Oral Daily With Dinner Jessie Guadarrama MD      baclofen  10 mg Oral BID PRN Jessie Guadarrama MD      clopidogrel  75 mg Oral Daily Jessie Guadarrama MD      docusate sodium  100 mg Oral BID NANY Ruby      ferrous sulfate  325 mg Oral Daily With Breakfast Jessie Guadarrama MD      [START ON 6/5/2024] furosemide  20 mg Oral Daily ANNY Ruby      heparin (porcine)  5,000 Units Subcutaneous Q8H Replaced by [facility] Jessie Guadarrama MD      latanoprost  1 drop Both Eyes HS Jessie Guadarrama MD      magnesium Oxide  400 mg Oral BID Jessie Guadarrama MD      metoprolol succinate  50 mg Oral Q12H Replaced by [facility] Jessie Guadarrama MD      Milnacipran HCl  1 tablet Oral Daily Jessie Guadarrama MD      ondansetron  4 mg Intravenous Q6H PRN Jessie Guadarrama MD      oxybutynin  10 mg Oral Daily Jessie Guadarrama MD      oxyCODONE-acetaminophen   1 tablet Oral Q12H PRN Jessie Guadarrama MD      pantoprazole  40 mg Oral Early Morning Jessiecari Guadarrama MD      polyethylene glycol  17 g Oral Daily PRN NANY Ruby      senna  2 tablet Oral HS NANY Ruby          Today, Patient Was Seen By: NANY Ruby    **Please Note: This note may have been constructed using a voice recognition system.**

## 2024-06-04 NOTE — ASSESSMENT & PLAN NOTE
Per patient.  No gross tremor noted on examination today  Could be secondary to metabolic derangements in the setting of SADAF/dehydration  Patient has a spinal stimulator??  If tremor does not resolve this may need to be investigated further.  Will monitor for now  Treat SADAF as above  Fall precautions  6/4 No tremor seen, patient says it has resolved

## 2024-06-04 NOTE — CASE MANAGEMENT
Case Management Discharge Planning Note    Patient name Mattie Varela  Location [facility] /E4 -* MRN [id number]  : 1945 Date 2024       Current Admission Date: 6/3/2024  Current Admission Diagnosis:SADAF (acute kidney injury) ([facility])   Patient Active Problem List    Diagnosis Date Noted Date Diagnosed    Nausea 2024     Leg pain, right 2024     Acute on chronic diastolic congestive heart failure ([facility]) 2024     s/p [employer] loop recorder 3/2/2024 2024     Moderate protein-calorie malnutrition ([facility]) 2024     Hypertensive urgency 2024     AMS (altered mental status) 2024     Encounter for examination for admission to nursing home 2024     Stage 3a chronic kidney disease ([facility]) 01/10/2024     Left ventricular outflow obstruction 2024     Obesity, Class I, BMI 30-34.9 2024     Urinary retention 2023     SADAF (acute kidney injury) ([facility]) 2023     Tremor 2023     Exertional shortness of breath 2023     Non obstructive CAD 11/15/2023     History of lumbar surgery 11/10/2023     Abnormal urinalysis 2023     Chronic obstructive pulmonary disease, unspecified COPD type ([facility]) 2023     Confusion with body shakes and blank stare 2023     Normocytic anemia 2023     Lower extremity edema 2023     Cerebrovascular accident (CVA), unspecified mechanism ([facility]) 2022     Stroke-like symptoms 2022     Prepyloric ulcer 2021     Diarrhea 2021     Mild intermittent asthma without complication 2020     S/P insertion of spinal cord stimulator 2018     Iron deficiency anemia secondary to inadequate dietary iron intake 2018     Type 2 diabetes mellitus with other skin complications ([facility])      Failed back surgical syndrome 2018     Hypothyroidism 2017     Degenerative lumbar spinal stenosis 2017     Glaucoma 2017     Overactive bladder 2016      Dyspepsia 02/09/2016     Hypercholesterolemia 02/09/2016     Anxiety 02/09/2015     Chronic pain disorder 12/18/2013     Hypertension 06/12/2013       LOS (days): 0  Geometric Mean LOS (GMLOS) (days):   Days to GMLOS:     OBJECTIVE:            Current admission status: Observation   Preferred Pharmacy:   [id number] - [place] - [address]  [address] [facility] [address]  Phone: [phone] Fax: [phone]    [id number] - [place], [place] - [address] [facility] [address]  Phone: [phone] Fax: [phone]    [id number] - [place], [place] - [address] [address]  Phone: [phone] Fax: [phone]    [id number]  [place] - [address]  [address] [facility] [address]  Phone: [phone] Fax: [phone]    [facility]. [place] [address]  [facility] [address]  [address] [place] [address]  Phone: [phone] Fax: [phone]    [facility] [place] - [address]  [facility],  [address]  [facility],  First [address] [facility] [address]  Phone: [phone] Fax: [phone]    Primary Care Provider: NANY Pollock    Primary Insurance: [employer] [facility]  Secondary Insurance:     DISCHARGE DETAILS:    CM received a PC from [employer] requesting an update on patient.  CM informed them that anticipated DC is 24 hours.  Patient would benefit from additional SN visits to assist with HF management.  [employer] RN meets with patient monthly, they will re evaluate her at their center to determine if additional SN, PT, and OT visits are warranted.   Patient resides at [facility].  [employer] confirmed that patient can be transported via [facility], if patient receives a bill they will cover the cost.

## 2024-06-04 NOTE — ASSESSMENT & PLAN NOTE
Per patient.  No gross tremor noted on examination today  Could be secondary to metabolic derangements in the setting of SADAF/dehydration  Patient has a spinal stimulator??  If tremor does not resolve this may need to be investigated further.  Will monitor for now  Treat SADAF as above  Fall precautions

## 2024-06-04 NOTE — PROGRESS NOTES
ADT in basket for admission to [facility] for SADAF. Will monitor for discharge

## 2024-06-04 NOTE — ASSESSMENT & PLAN NOTE
SADAF on CKD 3A  Potentially from overdiuresis  S/p IVF bolus in ED  Will defer further fluids at this time, repeat renal function in a.m.  Hold lasix for now, may need to reduce dose from tomorrow  Continue to monitor renal function daily  Daily weights   ROS o/w negative Unable to Obtain Unobtainable Unobtainable

## 2024-06-04 NOTE — ASSESSMENT & PLAN NOTE
Has chronic edema due to heart failure and venous insufficiency  Continue with leg elevation, appreciate nursing care   Chronic wounds appear non infected, wound care consulted

## 2024-06-04 NOTE — ASSESSMENT & PLAN NOTE
SADAF on CKD 3A  Baseline creatinine: 0.8-1.1  Creatinine on admission: 1.48, peak  Potentially from overdiuresis/dehydration  S/p 500 mL IVF bolus in ED  Creatinine improved  Restart lasix 6/5 at decreased dosage of 20 mg daily  Continue to monitor renal function daily  Daily weights  SADAF resolved, plan to discharge on decreased lasix dose    Lab Results   Component Value Date/Time    CREATININE 1.09 06/05/2024 05:10 AM    CREATININE 1.17 06/04/2024 10:40 AM    CREATININE 1.48 (H) 06/03/2024 07:07 PM

## 2024-06-04 NOTE — ASSESSMENT & PLAN NOTE
Reports has been ongoing since earlier in the day, no inciting events  No recent changes to her medications.  She is on aspirin and Plavix however no epigastric pain  Suspect this is due to chronic dyspepsia  Can continue home meds for now  Continue with PPI daily  Zofran as needed nausea or vomiting

## 2024-06-04 NOTE — H&P
[facility]  H&P  Name: Mattie Varela 79 y.o. female I MRN: [id number]  Unit/Bed#: E4 -01 I Date of Admission: 6/3/2024   Date of Service: 6/4/2024 I Hospital Day: 0      Assessment & Plan   * SADAF (acute kidney injury) (HCC)  Assessment & Plan  SADAF on CKD 3A  Potentially from overdiuresis  S/p IVF bolus in ED  Will defer further fluids at this time, repeat renal function in a.m.  Hold lasix for now, may need to reduce dose from tomorrow  Continue to monitor renal function daily  Daily weights    Nausea  Assessment & Plan  Reports has been ongoing since earlier in the day, no inciting events  No recent changes to her medications.  She is on aspirin and Plavix however no epigastric pain  Suspect this is due to chronic dyspepsia  Can continue home meds for now  Continue with PPI daily  Zofran as needed nausea or vomiting    Tremor  Assessment & Plan  Per patient.  No gross tremor noted on examination today  Could be secondary to metabolic derangements in the setting of SADAF/dehydration  Patient has a spinal stimulator??  If tremor does not resolve this may need to be investigated further.  Will monitor for now  Treat SADAF as above  Fall precautions    Lower extremity edema  Assessment & Plan  Has chronic edema due to heart failure and venous insufficiency  Continue with leg elevation, appreciate nursing care   Chronic wounds non infected    Cerebrovascular accident (CVA), unspecified mechanism (HCC)  Assessment & Plan  Continue with aspirin, Plavix and statin    Mild intermittent asthma without complication  Assessment & Plan  Albuterol as needed    Overactive bladder  Assessment & Plan  Continue with oxybutynin inpatient    Iron deficiency anemia secondary to inadequate dietary iron intake  Assessment & Plan  Hemoglobin is currently around her baseline  Continue with iron supplement             VTE Prophylaxis: Heparin  / sequential compression device   Code Status: Level 1   POLST:  There is no POLST form on file for this patient (pre-hospital)  Discussion with family: No    Anticipated Length of Stay:  Patient will be admitted on an Observation basis with an anticipated length of stay of  1 - 2 midnights.   Justification for Hospital Stay: as above    Total Time for Visit, including Counseling / Coordination of Care: 45 minutes.  Greater than 50% of this total time spent on direct patient counseling and coordination of care.    Chief Complaint:   Tremors because I'm dehydrated    History of Present Illness:    Mattie Varela is a 79 y.o. female with multiple medical problems as listed below who presents to the nursing home with complaints of tremors and dehydration. Patient states since this afternoon, she has felt tremors to her lower legs. She tends to have tremors when she is dehydrated and is her body's way of telling her so. She has not had any changes to her diet but notes she is not drinking as much despite peeing more frequently. She also has nausea which has been ongoing since today. Nothing makes it better or worse. Of note, patient was admitted for heart failure exacerbation 5/22 - 5/28/2024. At that time she was diuresed and her lasix dose was increased to 40mg from 20 mg daily on discharge. She has chronic leg swelling and leg wounds which are stable although she continues to take baclofen and oxycodone for pain in her legs and lower back.  She denies any vomiting, melena, dizziness, chest pain or coughing.  Patient lives at [facility] but feels she needs a higher level of care to address all her needs.     In the emergency room, workup with notable for increased creatinine of 1.48 from 1.12.  Urinalysis is clean.  She received a 500 ml bolus and is plan for admission for SADAF.    Review of Systems:    Review of Systems   Constitutional:  Negative for chills and fever.   HENT:  Negative for rhinorrhea.    Respiratory:  Negative for cough and shortness of breath.     Cardiovascular:  Positive for leg swelling. Negative for chest pain and palpitations.   Gastrointestinal:  Positive for nausea. Negative for abdominal pain, diarrhea and vomiting.   Endocrine: Negative for polyuria.   Genitourinary:  Negative for dysuria.   Musculoskeletal:  Positive for back pain.   Skin:  Positive for wound.   Neurological:  Positive for tremors. Negative for dizziness and light-headedness.       Past Medical and Surgical History:     Past Medical History:   Diagnosis Date    Acid reflux     Acute kidney injury (HCC)     Anemia     hx of iron-deficient    Anxiety     Arthritis     Asthma     last needed inhaler last year 2020    Basal cell carcinoma     upper lip    Chronic narcotic dependence (HCC)     Chronic pain     Colon polyp     Cystocele     Diabetes mellitus (HCC)     stable    Disease of thyroid gland     hypothyroidism    Diverticulosis     Dizziness     at times    Dysfunctional uterine bleeding     last assessed - [id number]    Dysphagia     Fibromyalgia     Gastric ulcer     Gastroparesis     History of colonic polyps     last assessed - [id number]    History of gastroesophageal reflux (GERD)     Hypercholesterolemia     Hyperlipidemia     Hypertension     Hyponatremia     IBS (irritable bowel syndrome)     Pneumobilia 06/18/2022    Post laminectomy syndrome     S/P insertion of spinal cord stimulator 07/18/2018    s/p [employer] loop recorder 3/2/2024 03/02/2024    Seasonal allergies     Shortness of breath     exertional    Spinal stenosis     Status post lumbar spinal fusion 03/16/2018    Stroke ([facility])     pt states slight stroke March 2022       Past Surgical History:   Procedure Laterality Date    APPENDECTOMY      BACK SURGERY      BREAST CYST EXCISION Left     CARDIAC CATHETERIZATION  11/14/2023    Procedure: Cardiac catheterization;  Surgeon: Randolph Holt MD;  [facility];  Service: Cardiology    CARDIAC CATHETERIZATION N/A 11/14/2023    Procedure: Cardiac  Coronary Angiogram;  Surgeon: Randolph Holt MD;  [facility];  Service: Cardiology    CARDIAC ELECTROPHYSIOLOGY PROCEDURE N/A 3/2/2024    Procedure: Cardiac loop recorder implant;  Surgeon: Edu Fontaine MD;  [facility]: [facility];  Service: Cardiology    CHOLECYSTECTOMY      COLONOSCOPY      ESOPHAGOGASTRODUODENOSCOPY N/A 09/28/2016    Procedure: ESOPHAGOGASTRODUODENOSCOPY (EGD);  Surgeon: Mylene Moeller MD;  Location: [facility] GI LAB;  Service:     HERNIA REPAIR      HYSTERECTOMY      TTAH-BSO age 28    LAMINECTOMY      LUMBAR LAMINECTOMY      OOPHORECTOMY      age 28    NE ARTHRODESIS POSTERIOR/PSTLAT TQ 1NTRSPC THORACIC N/A 06/04/2018    Procedure: Reopening of lumbar incision for T12-L5 posterior instrumented fixation and fusion and T12-L4 posterior decompression;  Surgeon: Wood Rogel MD;  Location: [facility] MAIN [facility];  Service: Neurosurgery    NE COLONOSCOPY FLX DX W/COLLJ SPEC WHEN PFRMD N/A 03/02/2016    Procedure: EGD AND COLONOSCOPY;  Surgeon: Mylene Moeller MD;  [facility]: [facility] GI [facility];  Service: Gastroenterology    NE DILATION ESOPH UNGUIDED SOUND/BOUGIE 1/MULT PASS N/A 09/28/2016    Procedure: DILATATION ESOPHAGEAL;  Surgeon: Mylene Moeller MD;  [facility]: [facility] GI [facility];  Service: Gastroenterology    NE ESOPHAGOGASTRODUODENOSCOPY TRANSORAL DIAGNOSTIC N/A 07/18/2016    Procedure: ESOPHAGOGASTRODUODENOSCOPY (EGD);  Surgeon: Mylene Moeller MD;  [facility]: [facility];  Service: Gastroenterology    NE INSJ/RPLCMT SPINAL NPG/RCVR POCKET CRTJ&CONNJ Left 06/04/2018    Procedure: removal of left buttock implantable pulse generator and placement of new  implantable pulse generator;  Surgeon: Wood Rogel MD;  [facility]: [facility];  Service: Neurosurgery       Meds/Allergies:    Prior to Admission medications    Medication Sig Start Date End Date Taking? Authorizing Provider   atorvastatin (LIPITOR) 40 mg tablet TAKE ONE TABLET BY MOUTH ONCE DAILY  Patient taking differently: Take 40 mg by mouth daily  11/22/22  Yes NANY Pollock   baclofen 10 mg tablet Take 10 mg by mouth 2 (two) times a day as needed for muscle spasms   Yes Historical Provider, MD   ferrous sulfate 325 (65 Fe) mg tablet Take 1 tablet (325 mg total) by mouth daily with breakfast 1/10/24  Yes Paco Boyer MD   furosemide (LASIX) 20 mg tablet Take 2 tablets (40 mg total) by mouth daily  Patient taking differently: Take 20 mg by mouth 2 (two) times a day 5/28/24  Yes Krystina Gilliam PA-C   latanoprost (XALATAN) 0.005 % ophthalmic solution Administer 1 drop to both eyes daily at bedtime   Yes Historical Provider, MD   MAGNESIUM OXIDE 400 PO Take 400 mg by mouth 2 (two) times a day   Yes Historical Provider, MD   metFORMIN (GLUCOPHAGE) 1000 MG tablet Take 1 tablet (1,000 mg total) by mouth 2 (two) times a day with meals  Patient taking differently: Take 500 mg by mouth 2 (two) times a day with meals 2/10/23  Yes NANY Pollock   metoprolol succinate (TOPROL-XL) 50 mg 24 hr tablet Take 1 tablet (50 mg total) by mouth every 12 (twelve) hours 11/15/23  Yes Gustabo Lara MD   Milnacipran HCl (Savella) 100 MG TABS Take 1 tablet (100 mg total) by mouth daily 3/5/23  Yes Venice Baires DO   oxyCODONE-acetaminophen (PERCOCET)  mg per tablet Take 1 tablet by mouth 2 (two) times a day   Yes Historical Provider, MD   pantoprazole (PROTONIX) 40 mg tablet Take 1 tablet (40 mg total) by mouth daily 1/10/24 6/3/24 Yes Paco Boyer MD   trospium (SANCTURA XR) 60 mg 24 hr capsule Take 1 capsule (60 mg total) by mouth daily before breakfast 4/3/23  Yes NANY Pollock   albuterol (PROVENTIL HFA,VENTOLIN HFA) 90 mcg/act inhaler Inhale 2 puffs every 6 (six) hours as needed for wheezing or shortness of breath 6/29/22   NANY Pollock   aspirin 81 mg chewable tablet Chew 81 mg daily    Historical Provider, MD   Blood Glucose Monitoring Suppl (OneTouch Verio Reflect) w/Device KIT Check blood sugars twice daily. Please  substitute with appropriate alternative as covered by patient's insurance. Dx: E11.65 2/20/23   NANY Pollock   clopidogrel (Plavix) 75 mg tablet Take 1 tablet (75 mg total) by mouth daily 12/1/23 3/2/24  Faheem Anne PA-C   glucose blood (OneTouch Verio) test strip Check blood sugars twice daily. Please substitute with appropriate alternative as covered by patient's insurance. Dx: E11.65 2/20/23   NANY Pollock   levothyroxine 75 mcg tablet Take 0.5 tablets (37.5 mcg total) by mouth daily in the early morning  Patient not taking: Reported on 6/3/2024 1/17/24 6/3/24  Alejandro Philip MD   meclizine (ANTIVERT) 25 mg tablet Take 1 tablet (25 mg total) by mouth 4 (four) times a day as needed for dizziness  Patient taking differently: Take 25 mg by mouth daily as needed for dizziness 3/26/23 5/22/24  Venice Baires, DO   OneTouch Delica Lancets 33G MISC Check blood sugars twice daily. Please substitute with appropriate alternative as covered by patient's insurance. Dx: E11.65 2/20/23   NANY Pollock   senna-docusate sodium (SENOKOT-S) 8.6-50 mg per tablet Take 2 tablets by mouth if needed for constipation    Historical Provider, MD   sucralfate (CARAFATE) 1 g tablet Take 1 tablet (1 g total) by mouth 4 (four) times a day 7/18/22 10/13/22  Reese Peña MD     I have reviewed home medications using allscripts.    Allergies:   Allergies   Allergen Reactions    Penicillins Anaphylaxis, Hives and Other (See Comments)     Other reaction(s): Unknown Reaction    Sulfa Antibiotics Anaphylaxis and Other (See Comments)     Other reaction(s): Unknown Reaction    Aspartame - Food Allergy Other (See Comments) and Hypertension     Slurred speech, weakness, stroke sx    Iodinated Contrast Media Hives     Pt has taken prep prior for contrast and has not had any break through reaction    Keflex [Cephalexin] Hives       Social History:     Marital Status:    Substance Use History:   Social  History     Substance and Sexual Activity   Alcohol Use Not Currently    Comment: Denied history of alcohol use     Social History     Tobacco Use   Smoking Status Former    Current packs/day: 0.00    Types: Cigarettes    Quit date: 1970    Years since quittin.4   Smokeless Tobacco Never   Tobacco Comments    Denied history of current ever day smoker, Former smoker and Never smoker all documented in Allscripts     Social History     Substance and Sexual Activity   Drug Use No    Comment: Denied history of drug use       Family History:    non-contributory    Physical Exam:     Vitals:   Blood Pressure: 133/76 (24 0021)  Pulse: 76 (24 0021)  Temperature: (!) 97.1 °F (36.2 °C) (24)  Temp Source: Temporal (24)  Respirations: 16 (24)  Height: 5' (152.4 cm) (24)  Weight - Scale: 83.7 kg (184 lb 8.4 oz) (24)  SpO2: 97 % (24)    Physical Exam  Constitutional:       General: She is not in acute distress.     Appearance: She is obese. She is not ill-appearing.   HENT:      Head: Normocephalic.      Nose: No rhinorrhea.   Eyes:      General: No scleral icterus.  Cardiovascular:      Rate and Rhythm: Normal rate and regular rhythm.      Heart sounds: No murmur heard.  Pulmonary:      Effort: Pulmonary effort is normal. No respiratory distress.      Breath sounds: Normal breath sounds.   Abdominal:      General: Bowel sounds are normal. There is no distension.      Palpations: Abdomen is soft.   Musculoskeletal:      Right lower leg: Edema present.      Left lower leg: Edema present.      Comments: Healing wounds to bilateral legs, clean. Refer to media.    Skin:     General: Skin is warm and dry.      Capillary Refill: Capillary refill takes less than 2 seconds.   Neurological:      General: No focal deficit present.      Mental Status: She is alert.       Additional Data:     Lab Results: I have personally reviewed pertinent reports.       Results from last 7 days   Lab Units 06/03/24  1907   WBC Thousand/uL 6.97   HEMOGLOBIN g/dL 9.8*   HEMATOCRIT % 32.2*   PLATELETS Thousands/uL 226   SEGS PCT % 54   LYMPHO PCT % 27   MONO PCT % 11   EOS PCT % 7*     Results from last 7 days   Lab Units 06/03/24  1907 05/31/24  0331   SODIUM mmol/L 134* 130*   POTASSIUM mmol/L 4.8 4.1   CHLORIDE mmol/L 100 94*   CO2 mmol/L 31 25   BUN mg/dL 26* 23   CREATININE mg/dL 1.48* 1.12   ANION GAP mmol/L 3* 11   CALCIUM mg/dL 8.9 9.2   ALBUMIN g/dL  --  3.9   TOTAL BILIRUBIN mg/dL  --  0.30   ALK PHOS U/L  --  115*   ALT U/L  --  10   AST U/L  --  18   GLUCOSE RANDOM mg/dL 120 189*         Results from last 7 days   Lab Units 05/28/24  1111 05/28/24  0741   POC GLUCOSE mg/dl 191* 154*               Imaging: I have personally reviewed pertinent reports.      No orders to display       EKG, Pathology, and Other Studies Reviewed on Admission:   EKG: N/A    Allscripts / Epic Records Reviewed: Yes     ** Please Note: This note has been constructed using a voice recognition system. **

## 2024-06-04 NOTE — PLAN OF CARE
Problem: PAIN - ADULT  Goal: Verbalizes/displays adequate comfort level or baseline comfort level  Description: Interventions:  - Encourage patient to monitor pain and request assistance  - Assess pain using appropriate pain scale  - Administer analgesics based on type and severity of pain and evaluate response  - Implement non-pharmacological measures as appropriate and evaluate response  - Consider cultural and social influences on pain and pain management  - Notify physician/advanced practitioner if interventions unsuccessful or patient reports new pain  Outcome: Progressing     Problem: INFECTION - ADULT  Goal: Absence or prevention of progression during hospitalization  Description: INTERVENTIONS:  - Assess and monitor for signs and symptoms of infection  - Monitor lab/diagnostic results  - Monitor all insertion sites, i.e. indwelling lines, tubes, and drains  - Monitor endotracheal if appropriate and nasal secretions for changes in amount and color  - [place] appropriate cooling/warming therapies per order  - Administer medications as ordered  - Instruct and encourage patient and family to use good hand hygiene technique  - Identify and instruct in appropriate isolation precautions for identified infection/condition  Outcome: Progressing  Goal: Absence of fever/infection during neutropenic period  Description: INTERVENTIONS:  - Monitor WBC    Outcome: Progressing     Problem: SAFETY ADULT  Goal: Patient will remain free of falls  Description: INTERVENTIONS:  - Educate patient/family on patient safety including physical limitations  - Instruct patient to call for assistance with activity   - Consult OT/PT to assist with strengthening/mobility   - Keep Call bell within reach  - Keep bed low and locked with side rails adjusted as appropriate  - Keep care items and personal belongings within reach  - Initiate and maintain comfort rounds  - Make Fall Risk Sign visible to staff  - Offer Toileting every 2 Hours,  in advance of need  - Initiate/Maintain bed alarm  - Obtain necessary fall risk management equipment: In place   - Apply yellow socks and bracelet for high fall risk patients  - Consider moving patient to room near nurses station  Outcome: Progressing  Goal: Maintain or return to baseline ADL function  Description: INTERVENTIONS:  -  Assess patient's ability to carry out ADLs; assess patient's baseline for ADL function and identify physical deficits which impact ability to perform ADLs (bathing, care of mouth/teeth, toileting, grooming, dressing, etc.)  - Assess/evaluate cause of self-care deficits   - Assess range of motion  - Assess patient's mobility; develop plan if impaired  - Assess patient's need for assistive devices and provide as appropriate  - Encourage maximum independence but intervene and supervise when necessary  - Involve family in performance of ADLs  - Assess for home care needs following discharge   - Consider OT consult to assist with ADL evaluation and planning for discharge  - Provide patient education as appropriate  Outcome: Progressing  Goal: Maintains/Returns to pre admission functional level  Description: INTERVENTIONS:  - Perform AM-PAC 6 Click Basic Mobility/ Daily Activity assessment daily.  - Set and communicate daily mobility goal to care team and patient/family/caregiver.   - Collaborate with rehabilitation services on mobility goals if consulted  - Perform Range of Motion 3 times a day.  - Reposition patient every 2 hours.  - Dangle patient 3 times a day  - Stand patient 3 times a day  - Ambulate patient 3 times a day  - Out of bed to chair 3 times a day   - Out of bed for meals 3 times a day  - Out of bed for toileting  - Record patient progress and toleration of activity level   Outcome: Progressing     Problem: DISCHARGE PLANNING  Goal: Discharge to home or other facility with appropriate resources  Description: INTERVENTIONS:  - Identify barriers to discharge w/patient and  caregiver  - Arrange for needed discharge resources and transportation as appropriate  - Identify discharge learning needs (meds, wound care, etc.)  - Arrange for interpretive services to assist at discharge as needed  - Refer to Case Management Department for coordinating discharge planning if the patient needs post-hospital services based on physician/advanced practitioner order or complex needs related to functional status, cognitive ability, or social support system  Outcome: Progressing     Problem: Knowledge Deficit  Goal: Patient/family/caregiver demonstrates understanding of disease process, treatment plan, medications, and discharge instructions  Description: Complete learning assessment and assess knowledge base.  Interventions:  - Provide teaching at level of understanding  - Provide teaching via preferred learning methods  Outcome: Progressing

## 2024-06-04 NOTE — ASSESSMENT & PLAN NOTE
Has chronic edema due to heart failure and venous insufficiency  Continue with leg elevation, appreciate nursing care   Chronic wounds non infected

## 2024-06-04 NOTE — ASSESSMENT & PLAN NOTE
Takes [facility] as outpatient, receiving oxybutynin as substitute inpatient  Follows with outpatient Urology

## 2024-06-04 NOTE — UTILIZATION REVIEW
Initial Clinical Review    OBSERVATION 6/3 CHANGED TO INPATIENT ON 6/4 WITH SADAF TREATMENT, MED ADJUSTMENTS    Admission: Date/Time/Statement:   Admission Orders (From admission, onward)       Ordered        06/04/24 1442  INPATIENT ADMISSION  Once                          Orders Placed This Encounter   Procedures    INPATIENT ADMISSION     Standing Status:   Standing     Number of Occurrences:   1     Order Specific Question:   Level of Care     Answer:   Med Surg [16]     Order Specific Question:   Estimated length of stay     Answer:   More than 2 Midnights     Order Specific Question:   Certification     Answer:   I certify that inpatient services are medically necessary for this patient for a duration of greater than two midnights. See H&P and MD Progress Notes for additional information about the patient's course of treatment.     ED Arrival Information       Expected   -    Arrival   6/3/2024 17:38    Acuity   Urgent              Means of arrival   Stretcher    Escorted by   [facility] ([facility])    Service   Hospitalist    Admission type   Emergency              Arrival complaint   medical problem             Chief Complaint   Patient presents with    Tremors     Pt BIB EMS from [facility]. Pt reports tremors occur when she is dehydrated. Pt has slight tremor to both hands. Pt recently seen for same complaint.       Initial Presentation: 79 y.o. female presents to the ED via EMS from nursing facility with c/o tremors and dehydration.  Has not been drinking as much.  + nausea, recent d/c on 5/28 for exac CHF.  Was treated with Lasix and d/c on 20 mg daily.  PMH: chronic BLE swelling, CHF, multiple medical problems, h/o CVA, asthma, OAB, OLGA LIDIA.  In the ED VS stable.  Labs - low NA, elevated BUN/Cr, Alk phos. Treated with IV fluids, Tylenol.  On exam healing wound BLE with edema.  Normal breath sounds.  Admitted to OBSERVATION status with SADAF, nausea, tremor, BLE edema - hold Lasix for now, daily BMP,  weights, PRN Zofran, PPI, treat SADAF.     Anticipated Length of Stay/Certification Statement:  Patient will be admitted on an Observation basis with an anticipated length of stay of  1 - 2 midnights.   Justification for Hospital Stay: as above     Date: 6/4  CHANGED TO IP STATUS:   Restart Lasix on 6/5 at 20 mg daily, continue daily wt, no tremor seen on exam today.  + headache, has problems with sleep d/t urinating a lot during the night.  Sleeps during day from fatigue and is not drinking well.  BUN/CR WNL.  Hgb down to 9.8.     ED Triage Vitals   Temperature Pulse Respirations Blood Pressure SpO2   06/03/24 1740 06/03/24 1740 06/03/24 1740 06/03/24 1740 06/03/24 1740   97.8 °F (36.6 °C) 68 18 131/60 97 %      Temp Source Heart Rate Source Patient Position - Orthostatic VS BP Location FiO2 (%)   06/03/24 1740 06/03/24 1740 06/03/24 1740 06/03/24 1740 --   Oral Monitor Lying Right arm       Pain Score       06/03/24 1934       8          Wt Readings from Last 1 Encounters:   06/04/24 82.5 kg (181 lb 14.1 oz)     Additional Vital Signs:   Date/Time Temp Pulse Resp BP MAP (mmHg) SpO2 O2 Device Patient Position - Orthostatic VS   06/04/24 1514 98 °F (36.7 °C) 68 18 134/62 -- 94 % None (Room air) Lying   06/04/24 0734 97.1 °F (36.2 °C) Abnormal  71 18 137/59 85 97 % None (Room air) Lying   06/04/24 0021 -- 76 -- 133/76 -- -- -- --   06/03/24 2159 97.1 °F (36.2 °C) Abnormal  71 16 135/70 105 97 % None (Room air) Lying   06/03/24 2000 -- 68 -- 158/68 98 97 % None (Room air) Sitting   06/03/24 1954 -- 69 18 188/80 Abnormal  -- 97 % None (Room air) Sitting       Pertinent Labs/Diagnostic Test Results:     No orders to display         Results from last 7 days   Lab Units 06/03/24  1907 05/31/24  0331   WBC Thousand/uL 6.97 7.16   HEMOGLOBIN g/dL 9.8* 11.4*   HEMATOCRIT % 32.2* 36.4   PLATELETS Thousands/uL 226 236   TOTAL NEUT ABS Thousands/µL 3.76 3.33         Results from last 7 days   Lab Units 06/04/24  1040  06/03/24  1907 05/31/24  0331   SODIUM mmol/L 141 134* 130*   POTASSIUM mmol/L 4.8 4.8 4.1   CHLORIDE mmol/L 102 100 94*   CO2 mmol/L 33* 31 25   ANION GAP mmol/L 6 3* 11   BUN mg/dL 24 26* 23   CREATININE mg/dL 1.17 1.48* 1.12   EGFR ml/min/1.73sq m 43 32 46   CALCIUM mg/dL 9.3 8.9 9.2   MAGNESIUM mg/dL  --   --  2.0     Results from last 7 days   Lab Units 05/31/24  0331   AST U/L 18   ALT U/L 10   ALK PHOS U/L 115*   TOTAL PROTEIN g/dL 6.7   ALBUMIN g/dL 3.9   TOTAL BILIRUBIN mg/dL 0.30         Results from last 7 days   Lab Units 06/04/24  1040 06/03/24  1907 05/31/24  0331   GLUCOSE RANDOM mg/dL 150* 120 189*     Results from last 7 days   Lab Units 06/03/24  1951   CLARITY UA  Clear   COLOR UA  Yellow   SPEC GRAV UA  <=1.005   PH UA  5.5   GLUCOSE UA mg/dl Negative   KETONES UA mg/dl Negative   BLOOD UA  Negative   PROTEIN UA mg/dl Negative   NITRITE UA  Negative   BILIRUBIN UA  Negative   UROBILINOGEN UA E.U./dl 0.2   LEUKOCYTES UA  Negative     ED Treatment:   Medication Administration from 06/03/2024 1738 to 06/03/2024 2145         Date/Time Order Dose Route Action     06/03/2024 1908 EDT multi-electrolyte (ISOLYTE-S PH 7.4) bolus 500 mL 500 mL Intravenous New Bag     06/03/2024 1934 EDT acetaminophen (TYLENOL) tablet 650 mg 650 mg Oral Given          Past Medical History:   Diagnosis Date    Acid reflux     Acute kidney injury (HCC)     Anemia     hx of iron-deficient    Anxiety     Arthritis     Asthma     last needed inhaler last year 2020    Basal cell carcinoma     upper lip    Chronic narcotic dependence (HCC)     Chronic pain     Colon polyp     Cystocele     Diabetes mellitus (HCC)     stable    Disease of thyroid gland     hypothyroidism    Diverticulosis     Dizziness     at times    Dysfunctional uterine bleeding     last assessed - [id number]    Dysphagia     Fibromyalgia     Gastric ulcer     Gastroparesis     History of colonic polyps     last assessed - [id number]    History of gastroesophageal  reflux (GERD)     Hypercholesterolemia     Hyperlipidemia     Hypertension     Hyponatremia     IBS (irritable bowel syndrome)     Pneumobilia 06/18/2022    Post laminectomy syndrome     S/P insertion of spinal cord stimulator 07/18/2018    s/p [employer] loop recorder 3/2/2024 03/02/2024    Seasonal allergies     Shortness of breath     exertional    Spinal stenosis     Status post lumbar spinal fusion 03/16/2018    Stroke ([facility])     pt states slight stroke March 2022     Present on Admission:   SADAF (acute kidney injury) ([facility])   Iron deficiency anemia secondary to inadequate dietary iron intake   Mild intermittent asthma without complication   (Resolved) Tremor   Cerebrovascular accident (CVA), unspecified mechanism ([facility])   Lower extremity edema   Overactive bladder      Admitting Diagnosis: Dehydration [E86.0]  Lower extremity edema [R60.0]  Polypharmacy [Z79.899]  SADAF (acute kidney injury) ([facility]) [N17.9]  Tremors of nervous system [R25.1]  Age/Sex: 79 y.o. female  Admission Orders:  Scheduled Medications:  aspirin, 81 mg, Oral, Daily  atorvastatin, 40 mg, Oral, Daily With Dinner  clopidogrel, 75 mg, Oral, Daily  docusate sodium, 100 mg, Oral, BID  ferrous sulfate, 325 mg, Oral, Daily With Breakfast  [START ON 6/5/2024] furosemide, 20 mg, Oral, Daily  heparin (porcine), 5,000 Units, Subcutaneous, Q8H ARY  latanoprost, 1 drop, Both Eyes, HS  magnesium Oxide, 400 mg, Oral, BID  metoprolol succinate, 50 mg, Oral, Q12H ARY  Milnacipran HCl, 1 tablet, Oral, Daily  oxybutynin, 10 mg, Oral, Daily  pantoprazole, 40 mg, Oral, Early Morning  senna, 2 tablet, Oral, HS      Continuous IV Infusions:     PRN Meds:  acetaminophen, 650 mg, Oral, Q6H PRN - x 1 6/4  albuterol, 2 puff, Inhalation, Q6H PRN  baclofen, 10 mg, Oral, BID PRN - x 1 6/4  ondansetron, 4 mg, Intravenous, Q6H PRN  oxyCODONE-acetaminophen, 1 tablet, Oral, Q12H PRN - x 1 6/4  polyethylene glycol, 17 g, Oral, Daily PRN    PO PPI  PO Lasix   Wound care consult   OOB    Cardiac diet       Network Utilization Review Department  ATTENTION: Please call with any questions or concerns to [phone] and carefully listen to the prompts so that you are directed to the right person. All voicemails are confidential.   For Discharge needs, contact Care Management DC Support Team at [phone] opt. 2  Send all requests for admission clinical reviews, approved or denied determinations and any other requests to dedicated fax number below belonging to the campus where the patient is receiving treatment. List of dedicated fax numbers for the Facilities:  FACILITY NAME UR FAX NUMBER   ADMISSION DENIALS (Administrative/Medical Necessity) [phone]   DISCHARGE SUPPORT TEAM (NETWORK) [phone]   PARENT CHILD HEALTH (Maternity/NICU/Pediatrics) [phone]   [facility] [phone]   [facility] [phone]   [facility] [phone]   [facility] [phone]   [facility] [phone]   [facility] [phone]   [facility] [phone]   [facility] [phone]   [facility] [phone]   [facility] [phone]   [facility] [phone]   [facility] [phone]

## 2024-06-04 NOTE — UTILIZATION REVIEW
NOTIFICATION OF OBSERVATION ADMISSION   AUTHORIZATION REQUEST   SERVICING FACILITY:   [place] [address]  Tax ID: [id number]  NPI: [id number]   ATTENDING PROVIDER:  Attending Name and NPI#: Kaycee Foley Do [[id number]]  Address: [address] [place] [address]  Phone: [phone]     ADMISSION INFORMATION:  Place of Service: [facility]  Place of Service Code: 22 CPT Code:   Admitting Diagnosis Code/Description:  Dehydration [E86.0]  Lower extremity edema [R60.0]  Polypharmacy [Z79.899]  SADAF (acute kidney injury) (HCC) [N17.9]  Tremors of nervous system [R25.1]  Observation Admission Date/Time:   Discharge Date/Time: No discharge date for patient encounter.     UTILIZATION REVIEW CONTACT:  Lorraine Carter, Utilization   Network Utilization Review Department  Phone: [phone]  Fax: [phone]  Email: [email or web address]  Contact for approvals/pending authorizations, clinical reviews, and discharge.     PHYSICIAN ADVISORY SERVICES:  Medical Necessity Denial & Ghuk-yg-Lhma Review  Phone: [phone]  Fax: [phone]  Email: [email or web address]     DISCHARGE SUPPORT TEAM:  For Patients Discharge Needs & Updates  Phone: [phone] opt. 2 Fax: [phone]  Email: [email or web address]

## 2024-06-04 NOTE — PLAN OF CARE
Problem: PAIN - ADULT  Goal: Verbalizes/displays adequate comfort level or baseline comfort level  Description: Interventions:  - Encourage patient to monitor pain and request assistance  - Assess pain using appropriate pain scale  - Administer analgesics based on type and severity of pain and evaluate response  - Implement non-pharmacological measures as appropriate and evaluate response  - Consider cultural and social influences on pain and pain management  - Notify physician/advanced practitioner if interventions unsuccessful or patient reports new pain  Outcome: Progressing     Problem: INFECTION - ADULT  Goal: Absence or prevention of progression during hospitalization  Description: INTERVENTIONS:  - Assess and monitor for signs and symptoms of infection  - Monitor lab/diagnostic results  - Monitor all insertion sites, i.e. indwelling lines, tubes, and drains  - Monitor endotracheal if appropriate and nasal secretions for changes in amount and color  - Leslie appropriate cooling/warming therapies per order  - Administer medications as ordered  - Instruct and encourage patient and family to use good hand hygiene technique  - Identify and instruct in appropriate isolation precautions for identified infection/condition  Outcome: Progressing  Goal: Absence of fever/infection during neutropenic period  Description: INTERVENTIONS:  - Monitor WBC    Outcome: Progressing     Problem: SAFETY ADULT  Goal: Patient will remain free of falls  Description: INTERVENTIONS:  - Educate patient/family on patient safety including physical limitations  - Instruct patient to call for assistance with activity   - Consult OT/PT to assist with strengthening/mobility   - Keep Call bell within reach  - Keep bed low and locked with side rails adjusted as appropriate  - Keep care items and personal belongings within reach  - Initiate and maintain comfort rounds  - Make Fall Risk Sign visible to staff  - Offer Toileting every 3 Hours,  in advance of need  - Initiate/Maintain bed alarm  - Obtain necessary fall risk management equipment: alarm   - Apply yellow socks and bracelet for high fall risk patients  - Consider moving patient to room near nurses station  Outcome: Progressing  Goal: Maintain or return to baseline ADL function  Description: INTERVENTIONS:  -  Assess patient's ability to carry out ADLs; assess patient's baseline for ADL function and identify physical deficits which impact ability to perform ADLs (bathing, care of mouth/teeth, toileting, grooming, dressing, etc.)  - Assess/evaluate cause of self-care deficits   - Assess range of motion  - Assess patient's mobility; develop plan if impaired  - Assess patient's need for assistive devices and provide as appropriate  - Encourage maximum independence but intervene and supervise when necessary  - Involve family in performance of ADLs  - Assess for home care needs following discharge   - Consider OT consult to assist with ADL evaluation and planning for discharge  - Provide patient education as appropriate  Outcome: Progressing  Goal: Maintains/Returns to pre admission functional level  Description: INTERVENTIONS:  - Perform AM-PAC 6 Click Basic Mobility/ Daily Activity assessment daily.  - Set and communicate daily mobility goal to care team and patient/family/caregiver.   - Collaborate with rehabilitation services on mobility goals if consulted  - Perform Range of Motion 3 times a day.  - Reposition patient every 3 hours.  - Dangle patient 3 times a day  - Stand patient 3 times a day  - Ambulate patient 3 times a day  - Out of bed to chair 3 times a day   - Out of bed for meals 3 times a day  - Out of bed for toileting  - Record patient progress and toleration of activity level   Outcome: Progressing     Problem: DISCHARGE PLANNING  Goal: Discharge to home or other facility with appropriate resources  Description: INTERVENTIONS:  - Identify barriers to discharge w/patient and  caregiver  - Arrange for needed discharge resources and transportation as appropriate  - Identify discharge learning needs (meds, wound care, etc.)  - Arrange for interpretive services to assist at discharge as needed  - Refer to Case Management Department for coordinating discharge planning if the patient needs post-hospital services based on physician/advanced practitioner order or complex needs related to functional status, cognitive ability, or social support system  Outcome: Progressing     Problem: Knowledge Deficit  Goal: Patient/family/caregiver demonstrates understanding of disease process, treatment plan, medications, and discharge instructions  Description: Complete learning assessment and assess knowledge base.  Interventions:  - Provide teaching at level of understanding  - Provide teaching via preferred learning methods  Outcome: Progressing

## 2024-06-05 VITALS
HEART RATE: 63 BPM | SYSTOLIC BLOOD PRESSURE: 133 MMHG | OXYGEN SATURATION: 96 % | HEIGHT: 60 IN | TEMPERATURE: 98 F | WEIGHT: 182.98 LBS | RESPIRATION RATE: 18 BRPM | BODY MASS INDEX: 35.92 KG/M2 | DIASTOLIC BLOOD PRESSURE: 62 MMHG

## 2024-06-05 PROBLEM — R11.0 NAUSEA: Status: RESOLVED | Noted: 2024-06-04 | Resolved: 2024-06-05

## 2024-06-05 PROBLEM — K59.00 CONSTIPATION: Chronic | Status: ACTIVE | Noted: 2024-06-05

## 2024-06-05 LAB
ANION GAP SERPL CALCULATED.3IONS-SCNC: 5 MMOL/L (ref 4–13)
BUN SERPL-MCNC: 24 MG/DL (ref 5–25)
CALCIUM SERPL-MCNC: 9.4 MG/DL (ref 8.4–10.2)
CHLORIDE SERPL-SCNC: 103 MMOL/L (ref 96–108)
CO2 SERPL-SCNC: 31 MMOL/L (ref 21–32)
CREAT SERPL-MCNC: 1.09 MG/DL (ref 0.6–1.3)
ERYTHROCYTE [DISTWIDTH] IN BLOOD BY AUTOMATED COUNT: 13.5 % (ref 11.6–15.1)
GFR SERPL CREATININE-BSD FRML MDRD: 47 ML/MIN/1.73SQ M
GLUCOSE SERPL-MCNC: 140 MG/DL (ref 65–140)
HCT VFR BLD AUTO: 33 % (ref 34.8–46.1)
HGB BLD-MCNC: 10.4 G/DL (ref 11.5–15.4)
MCH RBC QN AUTO: 28.4 PG (ref 26.8–34.3)
MCHC RBC AUTO-ENTMCNC: 31.5 G/DL (ref 31.4–37.4)
MCV RBC AUTO: 90 FL (ref 82–98)
PLATELET # BLD AUTO: 207 THOUSANDS/UL (ref 149–390)
PMV BLD AUTO: 10.9 FL (ref 8.9–12.7)
POTASSIUM SERPL-SCNC: 4.8 MMOL/L (ref 3.5–5.3)
RBC # BLD AUTO: 3.66 MILLION/UL (ref 3.81–5.12)
SODIUM SERPL-SCNC: 139 MMOL/L (ref 135–147)
WBC # BLD AUTO: 5.73 THOUSAND/UL (ref 4.31–10.16)

## 2024-06-05 PROCEDURE — 99239 HOSP IP/OBS DSCHRG MGMT >30: CPT

## 2024-06-05 PROCEDURE — 85027 COMPLETE CBC AUTOMATED: CPT

## 2024-06-05 PROCEDURE — 80048 BASIC METABOLIC PNL TOTAL CA: CPT

## 2024-06-05 RX ORDER — BISACODYL 10 MG
10 SUPPOSITORY, RECTAL RECTAL DAILY PRN
Status: DISCONTINUED | OUTPATIENT
Start: 2024-06-05 | End: 2024-06-05 | Stop reason: HOSPADM

## 2024-06-05 RX ORDER — POLYETHYLENE GLYCOL 3350 17 G/17G
17 POWDER, FOR SOLUTION ORAL DAILY
Start: 2024-06-05

## 2024-06-05 RX ORDER — FUROSEMIDE 20 MG/1
20 TABLET ORAL DAILY
Start: 2024-06-06

## 2024-06-05 RX ORDER — POLYETHYLENE GLYCOL 3350 17 G/17G
17 POWDER, FOR SOLUTION ORAL 2 TIMES DAILY
Status: DISCONTINUED | OUTPATIENT
Start: 2024-06-05 | End: 2024-06-05 | Stop reason: HOSPADM

## 2024-06-05 RX ORDER — BISACODYL 10 MG
10 SUPPOSITORY, RECTAL RECTAL DAILY PRN
Qty: 12 SUPPOSITORY | Refills: 0 | Status: SHIPPED | OUTPATIENT
Start: 2024-06-05

## 2024-06-05 RX ORDER — DOCUSATE SODIUM 100 MG/1
100 CAPSULE, LIQUID FILLED ORAL 2 TIMES DAILY
Start: 2024-06-05

## 2024-06-05 RX ORDER — SENNOSIDES 8.6 MG
17.2 TABLET ORAL
Start: 2024-06-05

## 2024-06-05 RX ADMIN — MAGNESIUM OXIDE TAB 400 MG (241.3 MG ELEMENTAL MG) 400 MG: 400 (241.3 MG) TAB at 08:36

## 2024-06-05 RX ADMIN — OXYBUTYNIN CHLORIDE 10 MG: 10 TABLET, EXTENDED RELEASE ORAL at 08:36

## 2024-06-05 RX ADMIN — ASPIRIN 81 MG CHEWABLE TABLET 81 MG: 81 TABLET CHEWABLE at 08:36

## 2024-06-05 RX ADMIN — POLYETHYLENE GLYCOL 3350 17 G: 17 POWDER, FOR SOLUTION ORAL at 11:46

## 2024-06-05 RX ADMIN — DOCUSATE SODIUM 100 MG: 100 CAPSULE, LIQUID FILLED ORAL at 08:36

## 2024-06-05 RX ADMIN — METOPROLOL SUCCINATE 50 MG: 50 TABLET, EXTENDED RELEASE ORAL at 08:36

## 2024-06-05 RX ADMIN — FERROUS SULFATE TAB 325 MG (65 MG ELEMENTAL FE) 325 MG: 325 (65 FE) TAB at 08:36

## 2024-06-05 RX ADMIN — PANTOPRAZOLE SODIUM 40 MG: 40 TABLET, DELAYED RELEASE ORAL at 05:00

## 2024-06-05 RX ADMIN — ACETAMINOPHEN 650 MG: 325 TABLET, FILM COATED ORAL at 02:28

## 2024-06-05 RX ADMIN — CLOPIDOGREL BISULFATE 75 MG: 75 TABLET ORAL at 08:36

## 2024-06-05 RX ADMIN — HEPARIN SODIUM 5000 UNITS: 5000 INJECTION INTRAVENOUS; SUBCUTANEOUS at 05:00

## 2024-06-05 RX ADMIN — OXYCODONE HYDROCHLORIDE AND ACETAMINOPHEN 1 TABLET: 5; 325 TABLET ORAL at 08:36

## 2024-06-05 RX ADMIN — BACLOFEN 10 MG: 10 TABLET ORAL at 02:28

## 2024-06-05 RX ADMIN — FUROSEMIDE 20 MG: 20 TABLET ORAL at 08:36

## 2024-06-05 NOTE — UTILIZATION REVIEW
NOTIFICATION OF INPATIENT ADMISSION   AUTHORIZATION REQUEST   SERVICING FACILITY:   [place] [address]  Tax ID: [id number]  NPI: [id number] ATTENDING PROVIDER:  Attending Name and NPI#: Kaycee Foley Do [[id number]]  Address: [address] [place] [address]  Phone: [phone]     ADMISSION INFORMATION:  Place of Service: Inpatient [facility]  Place of Service Code: 21  Inpatient Admission Date/Time: 6/4/24  2:42 PM  Discharge Date/Time: No discharge date for patient encounter.  Admitting Diagnosis Code/Description:  Dehydration [E86.0]  Lower extremity edema [R60.0]  Polypharmacy [Z79.899]  SADAF (acute kidney injury) (HCC) [N17.9]  Tremors of nervous system [R25.1]     UTILIZATION REVIEW CONTACT:  Lorraine Carter, Utilization   Network Utilization Review Department  Phone: [phone]  Fax [phone]  Email: [email or web address]  Contact for approvals/pending authorizations, clinical reviews, and discharge.     PHYSICIAN ADVISORY SERVICES:  Medical Necessity Denial & Opgt-ft-Acss Review  Phone: [phone]  Fax: [phone]  Email: [email or web address]     DISCHARGE SUPPORT TEAM:  For Patients Discharge Needs & Updates  Phone: [phone] opt. 2 Fax: [phone]  Email: [email or web address]

## 2024-06-05 NOTE — DISCHARGE SUMMARY
[facility]  Discharge Summary  Name: Mattie Varela I MRN: [id number]  Unit/Bed#: E4 -01I Date of Admission: 6/3/2024   Date of Service: 6/3/2024  I Hospital Day: 1    * SADAF (acute kidney injury) (HCC)  Assessment & Plan  SADAF on CKD 3A  Baseline creatinine: 0.8-1.1  Creatinine on admission: 1.48, peak  Potentially from overdiuresis/dehydration  S/p 500 mL IVF bolus in ED  Creatinine improved  Restart lasix 6/5 at decreased dosage of 20 mg daily  Continue to monitor renal function daily  Daily weights  SADAF resolved, plan to discharge on decreased lasix dose    Lab Results   Component Value Date/Time    CREATININE 1.09 06/05/2024 05:10 AM    CREATININE 1.17 06/04/2024 10:40 AM    CREATININE 1.48 (H) 06/03/2024 07:07 PM        Constipation  Assessment & Plan  Patient states she has not had bowel movement since hospitalization.  She says it is normal for her to go 4-5 days between BMs  Continue Colace BID and Senna HS  Changed Miralax from PRN to BID  Ordered dulcolax suppository PRN  Continue bowel regimen on discharge    Lower extremity edema  Assessment & Plan  Has chronic edema due to heart failure and venous insufficiency  Continue with leg elevation, appreciate nursing care   Chronic wounds appear non infected, wound care consulted    Cerebrovascular accident (CVA), unspecified mechanism (HCC)  Assessment & Plan  Continue with aspirin, Plavix and statin    Mild intermittent asthma without complication  Assessment & Plan  Albuterol as needed    Overactive bladder  Assessment & Plan  Takes [facility] as outpatient, receiving oxybutynin as substitute inpatient  Follows with outpatient Urology      Iron deficiency anemia secondary to inadequate dietary iron intake  Assessment & Plan  Hemoglobin is currently around her baseline  Continue with iron supplement      Nausea-resolved as of 6/5/2024  Assessment & Plan  Reports has been ongoing since earlier in the day, no inciting events  No  recent changes to her medications.  She is on aspirin and Plavix however no epigastric pain  Suspect this is due to chronic dyspepsia  Can continue home meds for now  Continue with PPI daily  Zofran as needed nausea or vomiting    Tremor-resolved as of 6/4/2024  Assessment & Plan  Per patient.  No gross tremor noted on examination today  Could be secondary to metabolic derangements in the setting of SADAF/dehydration  Patient has a spinal stimulator??  If tremor does not resolve this may need to be investigated further.  Will monitor for now  Treat SADAF as above  Fall precautions  6/4 No tremor seen, patient says it has resolved        Medical Problems       Resolved Problems  Date Reviewed: 5/27/2024            Resolved    Tremor 6/4/2024     Resolved by  NANY Ruby    Nausea 6/5/2024     Resolved by  NANY Ruby        Discharging Physician / Practitioner: NANY Ruby  PCP: NANY Pollock  Admission Date:   Admission Orders (From admission, onward)       Ordered        06/04/24 1442  INPATIENT ADMISSION  Once            06/03/24 2054  Place in Observation  Once                          Discharge Date: 06/05/24    Consultations During Hospital Stay:  None    Procedures Performed:   None    Significant Findings / Test Results:   None    Incidental Findings:   None    Test Results Pending at Discharge (will require follow up):   None     Outpatient Tests Requested:  BMP in 1 week    Complications:  None    Reason for Admission: SADAF    Hospital Course:   Mattie Varela is a 79 y.o. female patient with  has a past medical history of Acid reflux, Acute kidney injury (HCC), Anemia, Anxiety, Arthritis, Asthma, Basal cell carcinoma, Chronic narcotic dependence (HCC), Chronic pain, Colon polyp, Cystocele, Diabetes mellitus (HCC), Disease of thyroid gland, Diverticulosis, Dizziness, Dysfunctional uterine bleeding, Dysphagia, Fibromyalgia, Gastric ulcer, Gastroparesis, History  of colonic polyps, History of gastroesophageal reflux (GERD), Hypercholesterolemia, Hyperlipidemia, Hypertension, Hyponatremia, IBS (irritable bowel syndrome), Pneumobilia (06/18/2022), Post laminectomy syndrome, S/P insertion of spinal cord stimulator (07/18/2018), s/p [employer] loop recorder 3/2/2024 (03/02/2024), Seasonal allergies, Shortness of breath, Spinal stenosis, Status post lumbar spinal fusion (03/16/2018), and Stroke (HCC). who originally presented to the hospital on 6/3/2024 due to Tremors (Pt BIB EMS from [facility] [place]. Pt reports tremors occur when she is dehydrated. Pt has slight tremor to both hands. Pt recently seen for same complaint.). Patient was found to have SADAF which was treated with 500 IVF bolus and hold home lasix.  SADAF and tremors resolved.  Patient being discharged with decreased lasix dosage of 20 mg daily instead of BID.        Please see above list of diagnoses and related plan for additional information.     Condition at Discharge: stable    Discharge Day Visit / Exam:   Subjective:  Patient   Vitals: Blood Pressure: 133/62 (06/05/24 0740)  Pulse: 63 (06/05/24 0740)  Temperature: 98 °F (36.7 °C) (06/04/24 2328)  Temp Source: Temporal (06/04/24 2328)  Respirations: 18 (06/05/24 0740)  Height: 5' (152.4 cm) (06/03/24 1740)  Weight - Scale: 83 kg (182 lb 15.7 oz) (06/05/24 0600)  SpO2: 96 % (06/05/24 0740)  Exam:   Physical Exam  Vitals and nursing note reviewed.   Constitutional:       Appearance: She is obese.   HENT:      Head: Normocephalic.      Comments: Headache     Nose: Nose normal.      Mouth/Throat:      Mouth: Mucous membranes are moist.      Pharynx: Oropharynx is clear.   Eyes:      Conjunctiva/sclera: Conjunctivae normal.   Cardiovascular:      Rate and Rhythm: Normal rate and regular rhythm.      Pulses: Normal pulses.      Heart sounds: Normal heart sounds. No murmur heard.  Pulmonary:      Effort: Pulmonary effort is normal. No respiratory distress.      Breath sounds:  Normal breath sounds. No wheezing or rhonchi.   Abdominal:      General: Bowel sounds are normal. There is no distension.      Tenderness: There is abdominal tenderness (lower abdomen). There is no guarding.   Musculoskeletal:         General: Normal range of motion.      Cervical back: Normal range of motion.      Right lower le+ Pitting Edema present.      Left lower le+ Pitting Edema present.   Skin:     General: Skin is warm and dry.      Comments: Bilateral shins wrapped with ace wrap   Neurological:      General: No focal deficit present.      Mental Status: She is alert and oriented to person, place, and time. Mental status is at baseline.   Psychiatric:         Mood and Affect: Mood normal.         Thought Content: Thought content normal.         Discussion with Family: Updated  (daughter) via phone.    Discharge instructions/Information to patient and family:   See after visit summary for information provided to patient and family.      Provisions for Follow-Up Care:  See after visit summary for information related to follow-up care and any pertinent home health orders.      Mobility at time of Discharge:   Basic Mobility Inpatient Raw Score: 15  JH-HLM Goal: 4: Move to chair/commode  JH-HLM Achieved: 4: Move to chair/commode  HLM Goal achieved. Continue to encourage appropriate mobility.     Disposition:   Assisted Living Facility at [facility]    Planned Readmission: None     Discharge Statement:  I spent 55 minutes discharging the patient. This time was spent on the day of discharge. I had direct contact with the patient on the day of discharge. Greater than 50% of the total time was spent examining patient, answering all patient questions, arranging and discussing plan of care with patient as well as directly providing post-discharge instructions.  Additional time then spent on discharge activities.    Discharge Medications:  See after visit summary for reconciled discharge  medications provided to patient and/or family.      **Please Note: This note may have been constructed using a voice recognition system**

## 2024-06-05 NOTE — UTILIZATION REVIEW
Continued Stay Review    Date: 6/5                          Current Patient Class: IP   Current Level of Care: MS    HPI:79 y.o. female initially admitted on 6/4  for SADAF     Assessment/Plan:     Date: 6/5  Day 3: Has surpassed a 2nd midnight with active treatments and services. Cr improved to 1.09 from 1.48 . Restart daily po lasix . Monitor weights . Cont w/ leg elevation . + 1 pitting edema sotero . Shins wrapped . CM following . Arrangements to return to her facility .         Vital Signs:   06/05/24 0740 -- 63 18 133/62 -- 96 % None (Room air) Lying   06/04/24 2328 98 °F (36.7 °C) 66 18 141/62 102 94 % None (Room air) Lying   06/04/24 1947 -- 81 18 143/62 95 96 % None (Room air) Lying       Pertinent Labs/Diagnostic Results:       Results from last 7 days   Lab Units 06/05/24  0510 06/03/24 1907 05/31/24  0331   WBC Thousand/uL 5.73 6.97 7.16   HEMOGLOBIN g/dL 10.4* 9.8* 11.4*   HEMATOCRIT % 33.0* 32.2* 36.4   PLATELETS Thousands/uL 207 226 236   TOTAL NEUT ABS Thousands/µL  --  3.76 3.33         Results from last 7 days   Lab Units 06/05/24  0510 06/04/24  1040 06/03/24 1907 05/31/24  0331   SODIUM mmol/L 139 141 134* 130*   POTASSIUM mmol/L 4.8 4.8 4.8 4.1   CHLORIDE mmol/L 103 102 100 94*   CO2 mmol/L 31 33* 31 25   ANION GAP mmol/L 5 6 3* 11   BUN mg/dL 24 24 26* 23   CREATININE mg/dL 1.09 1.17 1.48* 1.12   EGFR ml/min/1.73sq m 47 43 32 46   CALCIUM mg/dL 9.4 9.3 8.9 9.2   MAGNESIUM mg/dL  --   --   --  2.0     Results from last 7 days   Lab Units 05/31/24  0331   AST U/L 18   ALT U/L 10   ALK PHOS U/L 115*   TOTAL PROTEIN g/dL 6.7   ALBUMIN g/dL 3.9   TOTAL BILIRUBIN mg/dL 0.30         Results from last 7 days   Lab Units 06/05/24  0510 06/04/24  1040 06/03/24  1907 05/31/24  0331   GLUCOSE RANDOM mg/dL 140 150* 120 189*         Results from last 7 days   Lab Units 06/03/24 1951   CLARITY UA  Clear   COLOR UA  Yellow   SPEC GRAV UA  <=1.005   PH UA  5.5   GLUCOSE UA mg/dl Negative   KETONES UA mg/dl  Negative   BLOOD UA  Negative   PROTEIN UA mg/dl Negative   NITRITE UA  Negative   BILIRUBIN UA  Negative   UROBILINOGEN UA E.U./dl 0.2   LEUKOCYTES UA  Negative           Medications:   Scheduled Medications:  aspirin, 81 mg, Oral, Daily  atorvastatin, 40 mg, Oral, Daily With Dinner  clopidogrel, 75 mg, Oral, Daily  docusate sodium, 100 mg, Oral, BID  ferrous sulfate, 325 mg, Oral, Daily With Breakfast  furosemide, 20 mg, Oral, Daily  heparin (porcine), 5,000 Units, Subcutaneous, Q8H ARY  latanoprost, 1 drop, Both Eyes, HS  magnesium Oxide, 400 mg, Oral, BID  metoprolol succinate, 50 mg, Oral, Q12H ARY  Milnacipran HCl, 1 tablet, Oral, Daily  oxybutynin, 10 mg, Oral, Daily  pantoprazole, 40 mg, Oral, Early Morning  polyethylene glycol, 17 g, Oral, BID  senna, 2 tablet, Oral, HS      Continuous IV Infusions:     PRN Meds:  acetaminophen, 650 mg, Oral, Q6H PRN  albuterol, 2 puff, Inhalation, Q6H PRN  baclofen, 10 mg, Oral, BID PRN  bisacodyl, 10 mg, Rectal, Daily PRN  ondansetron, 4 mg, Intravenous, Q6H PRN  oxyCODONE-acetaminophen, 1 tablet, Oral, Q12H PRN        Discharge Plan: D     Network Utilization Review Department  ATTENTION: Please call with any questions or concerns to [phone] and carefully listen to the prompts so that you are directed to the right person. All voicemails are confidential.   For Discharge needs, contact Care Management DC Support Team at [phone] opt. [phone]  Send all requests for admission clinical reviews, approved or denied determinations and any other requests to dedicated fax number below belonging to the campus where the patient is receiving treatment. List of dedicated fax numbers for the Facilities:  FACILITY NAME UR FAX NUMBER   ADMISSION DENIALS (Administrative/Medical Necessity) [phone]   DISCHARGE SUPPORT TEAM (NETWORK) [phone]   PARENT CHILD HEALTH (Maternity/NICU/Pediatrics) [phone]   [facility] [phone]   [facility]  [facility] [phone]   [facility] [phone]   [facility] [phone]   [facility] [phone]   [facility] [phone]   [facility] [phone]   [facility] [phone]   [facility] [phone]   [facility] [phone]   [facility] [phone]   [facility] [phone]

## 2024-06-05 NOTE — PROGRESS NOTES
Patient:  LEO ZAPATA    MRN:  [id number]    Aidin Request ID:  [id number]    Level of care reserved:  Assisted Living/Congregate    Partner Reserved:  [facility] [id number] [phone]    Clinical needs requested:    Geography searched:  10 miles around [id number]    Start of Service:    Request sent:  12:00pm EDT on 6/5/2024 by Irving Olivas    Partner reserved:  12:50pm EDT on 6/5/2024 by Irving Olivas    Choice list shared:

## 2024-06-05 NOTE — PLAN OF CARE
Problem: PAIN - ADULT  Goal: Verbalizes/displays adequate comfort level or baseline comfort level  Description: Interventions:  - Encourage patient to monitor pain and request assistance  - Assess pain using appropriate pain scale  - Administer analgesics based on type and severity of pain and evaluate response  - Implement non-pharmacological measures as appropriate and evaluate response  - Consider cultural and social influences on pain and pain management  - Notify physician/advanced practitioner if interventions unsuccessful or patient reports new pain  Outcome: Progressing     Problem: INFECTION - ADULT  Goal: Absence or prevention of progression during hospitalization  Description: INTERVENTIONS:  - Assess and monitor for signs and symptoms of infection  - Monitor lab/diagnostic results  - Monitor all insertion sites, i.e. indwelling lines, tubes, and drains  - Monitor endotracheal if appropriate and nasal secretions for changes in amount and color  - [place] appropriate cooling/warming therapies per order  - Administer medications as ordered  - Instruct and encourage patient and family to use good hand hygiene technique  - Identify and instruct in appropriate isolation precautions for identified infection/condition  Outcome: Progressing  Goal: Absence of fever/infection during neutropenic period  Description: INTERVENTIONS:  - Monitor WBC    Outcome: Progressing     Problem: SAFETY ADULT  Goal: Patient will remain free of falls  Description: INTERVENTIONS:  - Educate patient/family on patient safety including physical limitations  - Instruct patient to call for assistance with activity   - Consult OT/PT to assist with strengthening/mobility   - Keep Call bell within reach  - Keep bed low and locked with side rails adjusted as appropriate  - Keep care items and personal belongings within reach  - Initiate and maintain comfort rounds  - Make Fall Risk Sign visible to staff  - Offer Toileting every 2 Hours,  in advance of need  - Initiate/Maintain bed alarm  - Obtain necessary fall risk management equipment: call bell  - Apply yellow socks and bracelet for high fall risk patients  - Consider moving patient to room near nurses station  Outcome: Progressing  Goal: Maintain or return to baseline ADL function  Description: INTERVENTIONS:  -  Assess patient's ability to carry out ADLs; assess patient's baseline for ADL function and identify physical deficits which impact ability to perform ADLs (bathing, care of mouth/teeth, toileting, grooming, dressing, etc.)  - Assess/evaluate cause of self-care deficits   - Assess range of motion  - Assess patient's mobility; develop plan if impaired  - Assess patient's need for assistive devices and provide as appropriate  - Encourage maximum independence but intervene and supervise when necessary  - Involve family in performance of ADLs  - Assess for home care needs following discharge   - Consider OT consult to assist with ADL evaluation and planning for discharge  - Provide patient education as appropriate  Outcome: Progressing  Goal: Maintains/Returns to pre admission functional level  Description: INTERVENTIONS:  - Perform AM-PAC 6 Click Basic Mobility/ Daily Activity assessment daily.  - Set and communicate daily mobility goal to care team and patient/family/caregiver.   - Collaborate with rehabilitation services on mobility goals if consulted  - Perform Range of Motion 3 times a day.  - Reposition patient every 2 hours.  - Dangle patient 3 times a day  - Stand patient 3 times a day  - Ambulate patient 3 times a day  - Out of bed to chair 3 times a day   - Out of bed for meals 3  Problem: DISCHARGE PLANNING  Goal: Discharge to home or other facility with appropriate resources  Description: INTERVENTIONS:  - Identify barriers to discharge w/patient and caregiver  - Arrange for needed discharge resources and transportation as appropriate  - Identify discharge learning needs (meds,  wound care, etc.)  - Arrange for interpretive services to assist at discharge as needed  - Refer to Case Management Department for coordinating discharge planning if the patient needs post-hospital services based on physician/advanced practitioner order or complex needs related to functional status, cognitive ability, or social support system  Outcome: Progressing     Problem: Knowledge Deficit  Goal: Patient/family/caregiver demonstrates understanding of disease process, treatment plan, medications, and discharge instructions  Description: Complete learning assessment and assess knowledge base.  Interventions:  - Provide teaching at level of understanding  - Provide teaching via preferred learning methods  Outcome: Progressing     Problem: Prexisting or High Potential for Compromised Skin Integrity  Goal: Skin integrity is maintained or improved  Description: INTERVENTIONS:  - Identify patients at risk for skin breakdown  - Assess and monitor skin integrity  - Assess and monitor nutrition and hydration status  - Monitor labs   - Assess for incontinence   - Turn and reposition patient  - Assist with mobility/ambulation  - Relieve pressure over bony prominences  - Avoid friction and shearing  - Provide appropriate hygiene as needed including keeping skin clean and dry  - Evaluate need for skin moisturizer/barrier cream  - Collaborate with interdisciplinary team   - Patient/family teaching  - Consider wound care consult   Outcome: Progressing    times a day  - Out of bed for toileting  - Record patient progress and toleration of activity level   Outcome: Progressing

## 2024-06-05 NOTE — ASSESSMENT & PLAN NOTE
Patient states she has not had bowel movement since hospitalization.  She says it is normal for her to go 4-5 days between BMs  Continue Colace BID and Senna HS  Changed Miralax from PRN to BID  Ordered dulcolax suppository PRN  Continue bowel regimen on discharge

## 2024-06-05 NOTE — NURSING NOTE
AVS printed and given to Pt to bring to receiving facility  IV was removed/ No tele to remove  Pt was d/c via w/c van accompanied by transporter

## 2024-06-05 NOTE — DISCHARGE INSTR - OTHER ORDERS
Wound Care Plan:   1-Silicone Cream/Hydraguard lotion to bilateral buttocks, sacrum, and heels three times daily and as needed.  2-Elevate lower extremities for edema management.  Ensure heels float off of bed/chair surface to offload pressure.  3-Offloading air cushion in chair when out of bed.  4-Apply moisturizing skin cream to body daily and as needed.  5-Turn/reposition every 2 hours while in bed and weight shift frequently while in chair for pressure re-distribution on skin.  6- Bilateral Lower Extremities--cleanse with soap and water, pat dry.  Lotion to intact skin.  Apply oil emulsion dressing (adaptic) to recently healed areas. Cover with ABD and wrap with danita and ACE from toes to tibial tuberosity.  Change dressings three times a week and as needed.

## 2024-06-05 NOTE — CASE MANAGEMENT
Case Management Discharge Planning Note    Patient name Mattie Varela  Location East 4 /E4 -* MRN [id number]  : 1945 Date 2024       Current Admission Date: 6/3/2024  Current Admission Diagnosis:SADAF (acute kidney injury) ([facility])   Patient Active Problem List    Diagnosis Date Noted Date Diagnosed    Constipation 2024     Leg pain, right 2024     Acute on chronic diastolic congestive heart failure (HCC) 2024     s/p Medtronic loop recorder 3/2/2024 2024     Moderate protein-calorie malnutrition ([facility]) 2024     Hypertensive urgency 2024     AMS (altered mental status) 2024     Encounter for examination for admission to nursing home 2024     Stage 3a chronic kidney disease ([facility]) 01/10/2024     Left ventricular outflow obstruction 2024     Obesity, Class I, BMI 30-34.9 2024     Urinary retention 2023     SADAF (acute kidney injury) ([facility]) 2023     Exertional shortness of breath 2023     Non obstructive CAD 11/15/2023     History of lumbar surgery 11/10/2023     Abnormal urinalysis 2023     Chronic obstructive pulmonary disease, unspecified COPD type ([facility]) 2023     Confusion with body shakes and blank stare 2023     Normocytic anemia 2023     Lower extremity edema 2023     Cerebrovascular accident (CVA), unspecified mechanism ([facility]) 2022     Stroke-like symptoms 2022     Prepyloric ulcer 2021     Diarrhea 2021     Mild intermittent asthma without complication 2020     S/P insertion of spinal cord stimulator 2018     Iron deficiency anemia secondary to inadequate dietary iron intake 2018     Type 2 diabetes mellitus with other skin complications ([facility])      Failed back surgical syndrome 2018     Hypothyroidism 2017     Degenerative lumbar spinal stenosis 2017     Glaucoma 2017     Overactive bladder 2016     Dyspepsia  02/09/2016     Hypercholesterolemia 02/09/2016     Anxiety 02/09/2015     Chronic pain disorder 12/18/2013     Hypertension 06/12/2013       LOS (days): 1  Geometric Mean LOS (GMLOS) (days): 3.1  Days to GMLOS:2.2     OBJECTIVE:  Risk of Unplanned Readmission Score: 29.34      Current admission status: Inpatient   Preferred Pharmacy:   [id number] - [place], [place] - [address] [address]  Phone: [phone] Fax: [phone]    [id number] - [place], [place] - [address] [place] [address]  Phone: [phone] Fax: [phone]    [id number] - [place] - [address] [address]  Phone: [phone] Fax: [phone]    [id number] - [place] - [address]  [address] [facility] [address]  Phone: [phone] Fax: [phone]    [place] - [address]  [address]  [place] [address]  Phone: [phone] Fax: [phone]    [facility] [place] - [address]  [facility],  [address]  [facility],  First [address] [facility] [address]  Phone: [phone] Fax: [phone]    Primary Care Provider: NANY Pollock  Primary Insurance: [facility]  Secondary Insurance:     DISCHARGE DETAILS:    Discharge planning discussed with:: Patient and family at bedside.  Freedom of Choice: Yes  Comments - Freedom of Choice: FOC maintained - CM introduced self and role.  Reviewed DCP.  Patient medically cleared for facility return to [facility] today.  CM contacted family/caregiver?: Yes (present at bedside.)  Were Treatment Team discharge recommendations reviewed with patient/caregiver?: Yes  Did patient/caregiver verbalize understanding of patient care needs?: N/A- going to facility  Were patient/caregiver advised of the risks associated with not  following Treatment Team discharge recommendations?: Yes    Requested Home Health Care         Is the patient interested in HHC at discharge?:  ([facility] to manage)    Other Referral/Resources/Interventions Provided:  Interventions: Facility Return, Transportation  Referral Comments: CM contacted Corinne (EDWIN) to advise of patient return.  CM also contacted [facility] CM (Paul) with update.  AVS/discharge summary to be faxed to Corinne ([phone]) and [facility] ([phone]).  W/C van transport coordinated via [employer] for 1400 p/u.    Treatment Team Recommendation: Facility Return  Discharge Destination Plan:: Facility Return  Transport at Discharge : Wheelchair van  Dispatcher Contacted: Yes  Number/Name of Dispatcher: [employer] [phone]     ETA of Transport (Date): 06/05/24  ETA of Transport (Time): 1400     Transfer Mode: Wheelchair    Accepting Facility Name, City & State : [address], [facility]  Receiving Facility/Agency Phone Number: Phone: [phone]  Facility/Agency Fax Number: Fax: [phone]       @ 0541  FR faxed D/C Summary to [facility]inne ([phone]) and [facility] ([phone]).    @ 1254 - 9575 p/u confirmed with Ambucaleidy.

## 2024-06-05 NOTE — TELEPHONE ENCOUNTER
Called and LVM that sooner appt was found so want to [facility] and [facility] new [facility]

## 2024-06-05 NOTE — TELEPHONE ENCOUNTER
Sooner appt scheduled in cancellation spot. Please confirm.    If June appt confirmed ok to cancel October appt. If unable to make appt leave October appt as scheduled and patient will remain on wait list.

## 2024-06-05 NOTE — CONSULTS
Consult Note - Wound   Mattie A Nichole 79 y.o. female MRN: [id number]  Unit/Bed#: E4 -01 Encounter: [id number]      History and Present Illness:  79 Year old female with past medical history of SADAF, Anemia, Basal cell carcinoma, Diabetes, HTN, Fibromyalgia, Stroke presented to hospital with tremor to both hands.        Assessment Findings:   Pt seen for wound care consultation.  Patient was sitting in recliner with feet elevated and was agreeable to assessment.  Stands with assist X 1 with walker. Patent is incontinent at times.  Bilateral tibial areas were assessed have healed.  Bilateral buttocks and sacrum intact with scar tissue and hyperpigmented, clean dry and intact.  Bilateral heels both pink, blanchable.      Continue to recommend protection to recently healed areas with dressings and compression for edema control.     Wound Care Plan:   1-Silicone Cream/Hydraguard lotion to bilateral buttocks, sacrum, and heels three times daily and as needed.  2-Elevate lower extremities for edema management.  Ensure heels float off of bed/chair surface to offload pressure.  3-Offloading air cushion in chair when out of bed.  4-Apply moisturizing skin cream to body daily and as needed.  5-Turn/reposition every 2 hours while in bed and weight shift frequently while in chair for pressure re-distribution on skin.  6- Bilateral Lower Extremities--cleanse with soap and water, pat dry.  Lotion to intact skin.  Apply oil emulsion dressing (adaptic) to recently healed areas. Cover with ABD and wrap with danita and ACE from toes to tibial tuberosity.  Change dressings three times a week and as needed.    Recommendations placed on discharge AVS, patient being discharged today.     Wound 11/27/23 Pressure Injury Buttocks Left (Active)   Wound Image   06/05/24 1022   Wound Description Clean;Dry;Intact;Pink;Other (Comment) 06/05/24 1022   Jovana-wound Assessment Clean;Dry;Intact;Hyperpigmented 06/05/24 1022   Treatments Cleansed  06/05/24 1022   Dressing Open to air 06/05/24 1022   Patient Tolerance Tolerated well 06/05/24 1022       Wound 05/22/24 Venous Ulcer Pretibial Right (Active)   Wound Image   06/05/24 1026   Wound Description Clean;Dry;Pink 06/05/24 1026   Jovana-wound Assessment Clean;Dry;Intact;Pink 06/05/24 1026   Treatments Cleansed;Irrigation with NSS 06/05/24 1026   Dressing Calcium Alginate;Dry dressing;Other (Comment) 06/05/24 1026   Dressing Changed Changed 06/05/24 1026   Patient Tolerance Tolerated well 06/05/24 1026   Dressing Status Clean;Dry;Intact 06/05/24 1026       Wound 05/22/24 Venous Ulcer Pretibial Left (Active)   Wound Image   06/05/24 1025   Wound Description Pink;Clean;Dry 06/05/24 1025   Jovana-wound Assessment Clean;Dry;Intact;Pink 06/05/24 1025   Wound Length (cm) 0 cm 06/05/24 1025   Wound Width (cm) 0 cm 06/05/24 1025   Wound Depth (cm) 0 cm 06/05/24 1025   Wound Surface Area (cm^2) 0 cm^2 06/05/24 1025   Wound Volume (cm^3) 0 cm^3 06/05/24 1025   Calculated Wound Volume (cm^3) 0 cm^3 06/05/24 1025   Change in Wound Size % 100 06/05/24 1025   Treatments Cleansed;Irrigation with NSS 06/05/24 1025   Dressing Calcium Alginate 06/05/24 1026   Dressing Changed Changed 06/05/24 1025   Patient Tolerance Tolerated well 06/05/24 1025   Dressing Status Clean;Dry;Intact 06/05/24 1025     Nga Mccurdy, RN, BSN    Assessment and plan of care completed with Michell Ennis RN, BSN, CWON

## 2024-06-06 ENCOUNTER — TRANSITIONAL CARE MANAGEMENT (OUTPATIENT)
Dept: FAMILY MEDICINE CLINIC | Facility: CLINIC | Age: 79
End: 2024-06-06

## 2024-06-06 ENCOUNTER — TELEPHONE (OUTPATIENT)
Dept: CARDIOLOGY CLINIC | Facility: CLINIC | Age: 79
End: 2024-06-06

## 2024-06-06 ENCOUNTER — PATIENT OUTREACH (OUTPATIENT)
Dept: FAMILY MEDICINE CLINIC | Facility: CLINIC | Age: 79
End: 2024-06-06

## 2024-06-06 ENCOUNTER — TELEPHONE (OUTPATIENT)
Dept: FAMILY MEDICINE CLINIC | Facility: CLINIC | Age: 79
End: 2024-06-06

## 2024-06-06 NOTE — UTILIZATION REVIEW
NOTIFICATION OF ADMISSION DISCHARGE   This is a Notification of Discharge from [facility]. Please be advised that this patient has been discharge from our facility. Below you will find the admission and discharge date and time including the patient’s disposition.   UTILIZATION REVIEW CONTACT:  Lorraine Carter MA  Utilization   Network Utilization Review Department  Phone: [phone] x carefully listen to the prompts. All voicemails are confidential.  Email: [email or web address]     ADMISSION INFORMATION  PRESENTATION DATE: 6/3/2024  5:38 PM  OBERVATION ADMISSION DATE:   INPATIENT ADMISSION DATE: 6/4/24  2:42 PM   DISCHARGE DATE: 6/5/2024  2:30 PM   DISPOSITION:Non [facility] SNF/TCU/SNU    Network Utilization Review Department  ATTENTION: Please call with any questions or concerns to [phone] and carefully listen to the prompts so that you are directed to the right person. All voicemails are confidential.   For Discharge needs, contact Care Management DC Support Team at [phone] opt. 2  Send all requests for admission clinical reviews, approved or denied determinations and any other requests to dedicated fax number below belonging to the campus where the patient is receiving treatment. List of dedicated fax numbers for the Facilities:  FACILITY NAME UR FAX NUMBER   ADMISSION DENIALS (Administrative/Medical Necessity) [phone]   DISCHARGE SUPPORT TEAM ([facility]) [phone]   PARENT CHILD HEALTH (Maternity/NICU/Pediatrics) [phone]   [facility] [phone]   [facility] [phone]   [facility] [phone]   [facility] [phone]   [facility] [phone]   [facility] [phone]   [facility] [phone]   [facility]  [phone]   [facility] [phone]   [facility] [phone]   [facility] [phone]   [facility] [phone]

## 2024-06-06 NOTE — TELEPHONE ENCOUNTER
Left message for patient to call office to follow up after hospitalization.     Self-management/education and teach back:  Primary learner: self  Following low sodium diet: yes  Following fluid restriction: yes  Hospital discharge weight: 182 lbs 15.7 oz  Weighing daily: yes            Recording: yes  1st home weight 177 lbs      Weight today: 177 lbs    Monitoring symptoms: yes  Any current symptoms: no  Knows when to call provider: yes  Medication reviewed and taking all as prescribed (bring medications to follow up visits): Lasix 20 mg daily  Knows name of diuretic: yes  Any labs/studies needed for follow up: BMP 1 week     Escalation plan: reviewed  HF education reviewed/reinforced including low sodium diet, 64 oz fluid restriction, activity, symptoms of decompensation and when and who to call.     Care Coordination:  Aware of cardiology follow up appointment: 6/11/24 Jigna Bullock  Aware of PCP follow up appointment: has one scheduled, but unsure of date  Transportation: family  Social Support: family  Home health care: yes  Health literacy: good  Engagement: good  Personal Goal:   Additional comments: ok with weekly calls

## 2024-06-06 NOTE — RESTORATIVE TECHNICIAN NOTE
Restorative Technician Note      Patient Name: Mattie Sureshcoopergisell     Restorative Tech Visit Date: 06/06/24  Note Type: Mobility  Patient Position Upon Consult: Supine  Activity Performed: Ambulated; Range of motion  Assistive Device: Roller walker  Patient Position at End of Consult: Supine; All needs within reach

## 2024-06-06 NOTE — PROGRESS NOTES
Returned Mattie's call Reports she is doing pretty good. No tremors Appetite good. [facility] nurse was there to see her. Drinking fluids. No questions or concerns.

## 2024-06-13 ENCOUNTER — PATIENT OUTREACH (OUTPATIENT)
Dept: FAMILY MEDICINE CLINIC | Facility: CLINIC | Age: 79
End: 2024-06-13

## 2024-06-13 ENCOUNTER — TELEPHONE (OUTPATIENT)
Dept: CARDIOLOGY CLINIC | Facility: CLINIC | Age: 79
End: 2024-06-13

## 2024-06-13 NOTE — TELEPHONE ENCOUNTER
Spoke with patient, states she is getting stronger every day. No SOB or CP. States her legs are still swollen, but they do look better. They are wrapping them every 3 days.   Patient did come for follow up 6/11/24 but the bus needed to stop for gas and she arrived late and would not be seen.  Advised will call [facility] to reschedule.   Patient states that they do not weigh her every day and is usually after she eats.   Her last weight was Tues 6/11/24 and was 183 lbs.  DC weight was 182 lbs.   Reminded her if she gains 3 lbs over night or 5 lbs in 1 week to call office.  Patient trying to stay with low sodium diet, but needs to choose from the 2 options that are available at dining room.   She is watching her fluid intake.     [facility], advised patient do get daily weights, but they are done usually later in the day. States Mattie had weight yesterday at 1130 and it was 183.6 lbs.  He also advised that [facility] does monitor the weights.  Advised if patient has 3 lb weight gain overnight or 5 lbs in 1 week to notify our office.

## 2024-06-13 NOTE — PROGRESS NOTES
Spoke with Mattie reports she is doing okay. Getting stronger. Uses walker to ambulate. Legs are not weeping.  They rewrapped every other day. States she Saw [facility] doctor a few weeks ago   No questions or concerns

## 2024-06-18 ENCOUNTER — PROCEDURE VISIT (OUTPATIENT)
Dept: UROLOGY | Facility: CLINIC | Age: 79
End: 2024-06-18
Payer: MEDICARE

## 2024-06-18 ENCOUNTER — REMOTE DEVICE CLINIC VISIT (OUTPATIENT)
Dept: CARDIOLOGY CLINIC | Facility: CLINIC | Age: 79
End: 2024-06-18
Payer: MEDICARE

## 2024-06-18 VITALS
DIASTOLIC BLOOD PRESSURE: 84 MMHG | OXYGEN SATURATION: 99 % | HEART RATE: 75 BPM | SYSTOLIC BLOOD PRESSURE: 126 MMHG | WEIGHT: 179 LBS | BODY MASS INDEX: 35.14 KG/M2 | HEIGHT: 60 IN

## 2024-06-18 DIAGNOSIS — N32.81 DETRUSOR INSTABILITY: ICD-10-CM

## 2024-06-18 DIAGNOSIS — N36.44 DETRUSOR SPHINCTER DYSSYNERGIA: Primary | ICD-10-CM

## 2024-06-18 DIAGNOSIS — I63.9 CEREBROVASCULAR ACCIDENT (CVA), UNSPECIFIED MECHANISM (HCC): Primary | ICD-10-CM

## 2024-06-18 LAB — POST-VOID RESIDUAL VOLUME, ML POC: 107 ML

## 2024-06-18 PROCEDURE — 51798 US URINE CAPACITY MEASURE: CPT | Performed by: UROLOGY

## 2024-06-18 PROCEDURE — 52287 CYSTOSCOPY CHEMODENERVATION: CPT | Performed by: UROLOGY

## 2024-06-18 PROCEDURE — 93298 REM INTERROG DEV EVAL SCRMS: CPT | Performed by: INTERNAL MEDICINE

## 2024-06-18 NOTE — PROGRESS NOTES
Cystoscopy     Date/Time  6/18/2024 2:00 PM     Performed by  Benjamin Ramirez MD   Authorized by  Benjamin Ramirez MD         Procedure Details:  Procedure type: cystoscopy, injection for chemodenervation       Office Cystoscopy Procedure Note    Indication:    Detrusor overactivity with incontinence    Informed consent   The risks, benefits, complications, treatment options, and expected outcomes were discussed with the patient. The patient concurred with the proposed plan and provided informed consent.    Anesthesia  Lidocaine 2% was instilled through a Bateman catheter and allowed to indwell for 10 minutes prior to starting the procedure.      Procedure  In the presence of a female nurse, the patient was placed in the supine frog-legged position, was prepped and draped in the usual manner using sterile technique, and 2% lidocaine jelly instilled into the urethra.  A 17 F flexible cystoscope was then inserted into the urethra and the urethra and bladder carefully examined.  The following findings were noted:    Findings:  Urethra:  Normal  Bladder:  Normal  Ureteral orifices:  Normal  Other findings:  None     Units of bladder Botox was diluted injectable saline according to manufacture instructions and administered in a standard supratrigonal template    Following the procedure, the bladder was insufflated to 200 cc and the patient underwent an active voiding trial prior to discharge from the office.    Specimens: None                 Complications:    None; patient tolerated the procedure well           Disposition: To home after 30 minute observation.           Condition: Stable    Plan:     Will prescheduled the patient for repeat injection in 4 months.  At that time we will plan to titrate up to 200 units

## 2024-06-18 NOTE — PROGRESS NOTES
"MDT LNQ22 ICM - ACTIVE SYSTEM IS MRI CONDITIONAL   CARELINK TRANSMISSION: LOOP RECORDER. PRESENTING RHYTHM NSR @ 84 BPM. BATTERY STATUS \"OK.\" NO PATIENT OR DEVICE ACTIVATED EPISODES. NORMAL DEVICE FUNCTION. DL   "

## 2024-06-20 ENCOUNTER — TELEPHONE (OUTPATIENT)
Dept: CARDIOLOGY CLINIC | Facility: CLINIC | Age: 79
End: 2024-06-20

## 2024-06-20 ENCOUNTER — PATIENT OUTREACH (OUTPATIENT)
Dept: FAMILY MEDICINE CLINIC | Facility: CLINIC | Age: 79
End: 2024-06-20

## 2024-06-20 NOTE — TELEPHONE ENCOUNTER
Left message for patient calling for post hospital follow up. Advised to call office, phone # provided.

## 2024-06-20 NOTE — PROGRESS NOTES
Spoke with Mattie reports that the staff at [facility] has been trying to reach Maureen the nurse at [email or web address] for over a week and they have not been able to speak with her. Mattie wants me to make her daughter aware of this so she can try to reach Maureen  Legs are looking better but still are sore and need of wrapping.  Has her own scale weight today was 179 pounds yesterday 181 pounds. Instructed Mattie to report weight gain of 3 pounds in one day or 5 pounds in one week to staff at [facility]  States she drinks 30 ounces of water a day plus one juice and one coffee.   I will check back with her next week.  To see cardiology on 6/26/24

## 2024-06-20 NOTE — TELEPHONE ENCOUNTER
Patient returned my call    Symptoms:  -leg swelling []  -abdominal bloating, distension, pants fitting tighter []              -loss of appetite, feeling full sooner than usual []  -worsening shortness of breath/DUNBAR, activity intolerance[]  -difficulty lying flat, waking up a night feeling breathless, using more pillows, sleeping in a recliner []  -palpitations []  -chest pain/pressure []  -new or worsening cough []  -unusual fatigue []    Comments:no new symptoms        Weight:   -weighs self daily [x]  -dry/target weight  182 lbs  -today's weight  patient is now weighing herself daily, today 179 lbs  -understands what to do for rapid weight gain, ie 3lbs in 24 hours or 5 lbs in one week[x]     Comments:        Diet:   -consuming any high sodium foods (canned soups, lunch meats, fast food/take out, snack food, prepared foods, sport drinks) yes[]no[x]trying but difficult because she only has 2 options in the dining room.   - fluid consumed per day-      OZ  -2g (2000 mg) Sodium, 2L (2000 ml, around 60-64 oz) fluid restriction] reinforced [x]        Comments:        Medications:  -med list reviewed []   -missed doses or taking a different dose than prescribed?  yes[]no[x]  -still urinates well on current diuretic ? yes[x]no[]    -using O2 as directed? yes[]no[]NA     Comments:        Home vital signs: (if available)NA  BP ____  HR____  Pulse ox____     Recent labs: NA  BMP/CMP -   BNP, NT Pro BNP-  CBC-     Device check: NA  Arrhythmia burden ?      Optivol/Corvue crossed ?        Other possible triggers ?: NA  Recent viral symptoms/infection []  NSAID use []  Steroids []  Anemia []  COPD/hypoxia []  Not using CPAP/BiPAP []     Comments:patient aware of follow up appointment 6/26/24 with Rukhsana

## 2024-06-20 NOTE — PROGRESS NOTES
Left message on Mattie's voicemail that Maureen from [employer] will be back in office on Monday 6/24/24

## 2024-06-24 NOTE — PROGRESS NOTES
Mattie Varela  1945  [id number]  [address] [id number] [phone]    1. Chronic diastolic congestive heart failure (HCC)        2. Primary hypertension        3. Coronary artery disease involving native coronary artery of native heart without angina pectoris        4. Stroke-like symptoms        5. Hypercholesterolemia        6. Obesity, Class I, BMI 30-34.9            1. Chronic diastolic congestive heart failure (HCC)  Assessment & Plan:  - echo 05/23/24: LVEF 70%, grade I diastolic dysfunction, mild LVOT obstruction, moderate annular calcification, mild TR  - discharge weight: 166 lbs (at lowest weight in hospital 05/30/24)  - office weight: 179 lbs   - Neurohormonal Blockade:   -- beta blocker: Toprol- XL 50 mg BID   -- ACE/ARB/ARNi: none   -- aldosterone antagonist: none    -- diuretic: furosemide 20 mg daily     Pt is volume overloaded today with at least a 10-13 lb weight gain. Discussed increasing her diuretic to 40 mg daily and decreasing her metoprolol to 50 mg daily. Pt wants to discuss with her daughter who is a MD. She would rather not start diuretics at this time. She wants to try compression boots. Discussed the risks that compression may worsen the heart failure due to distributing fluid elsewhere other than her legs. She is concerned with worsening her kidney function.   - continue current regimen, continue daily weights  - call out office if she decides to make any changes to her regimen  - follow up in one month  2. Primary hypertension  Assessment & Plan:  - BP in office today: 100/60 mmHg  - hypertensive regimen: Toprol- XL 50 mg BID    Recommended decreasing beta blocker to 50 mg daily. Pt will call back after discussing with her daughter.  3. Coronary artery disease involving native coronary artery of native heart without angina pectoris  Assessment & Plan:  - [facility] 11/14/23:  diffuse calcified CAD, no focal significant lesions, OM1 70% stenosed, iFR nonsignificant   - beta blocker: Toprol- XL 50 mg BID  - antiplatelet: ASA 81 mg daily     Denies anginal symptoms.  4. Stroke-like symptoms  Assessment & Plan:  - November 2023: CTA demonstrated moderate stenosis of distal right MCA M1, focal moderate stenosis involving the inferior basilar artery with infundibular dilation at the right ICA origin   - antiplatelet: Plavix 75 mg daily   - statin: atorvastatin 80 mg daily   - loop interrogation 06/18/24: NSR no patient or device  activated episodes     Interrogation in March showed 2 episodes of AF. Anticoagulation per primary cardiologist.  5. Hypercholesterolemia  Assessment & Plan:  - lipid panel 11/26/23: cholesterol 120, triglycerides 179, HDL 42, LDL 42  - stain: atorvastatin 80 mg daily  6. Obesity, Class I, BMI 30-34.9  Assessment & Plan:  - BMI 34.96 kg/m2     RTO in one month.    Prior cardiac testing  Echo 05/23/24: LVEF 70%, grade 1 diastolic dysfunction, mild LVOT obstruction, moderate mitral annular calcification, mild TR  Cardiac catheterization: nonobstructive CAD   NM stress test 05/21/18: no perfusion defects, LVEF 69%, negative for ischemia     Interval History:   This is a 78 y/o female with a PMH of nonobstructive CAD, chronic HFpEF, stroke like symptoms, and HTN who is presenting today for follow up. Pt was last seen in office 01/10/24 by Dr. Boyer. Pt was not experiencing any chest discomfort at this visit and he had discontinued her Ranexa. She was referred to cardiology for further workup of her stroke like symptoms. She underwent Medtronic loop recorder implantation 03/02/24. She did have 2 device classified atrial fibrillation episodes on 03/14/24 with < 0.1% of atrial fibrillation burden. Her last interrogation June 2024 showed normal device function with no underlying arrhythmias.    Pt was hospitalized in the end of May 2024 with bilateral leg swelling and a 21  pound weight gain in the last 3 months. On discharge her lasix was increased to 40 mg daily. Cardiology was no consulted during her hospitalization. Her BNP was elevated at 302. CXR showed no acute cardiopulmonary disease.     Today pt is up about 10-13 pounds from the end of may (166 lbs). She states she feels as if her legs did not get any better. She also admits to abdominal bloating. She is involved in [employer] who helps her wrap her legs in ACE bandages about 3 times a week. They also provide meals and help with medications. She denies chest pain, lightheadedness, dizziness, SOB, palpitations, and flutters in her chest. She states her legs are about 3x the size compared to last fall. She estimates her dry weight to be about 163 lbs.     Past Medical History:   Diagnosis Date    Acid reflux     Acute kidney injury (HCC)     Anemia     hx of iron-deficient    Anxiety     Arthritis     Asthma     last needed inhaler last year 2020    Basal cell carcinoma     upper lip    Chronic narcotic dependence (HCC)     Chronic pain     Colon polyp     Cystocele     Diabetes mellitus (HCC)     stable    Disease of thyroid gland     hypothyroidism    Diverticulosis     Dizziness     at times    Dysfunctional uterine bleeding     last assessed - [id number]    Dysphagia     Fibromyalgia     Gastric ulcer     Gastroparesis     History of colonic polyps     last assessed - [id number]    History of gastroesophageal reflux (GERD)     Hypercholesterolemia     Hyperlipidemia     Hypertension     Hyponatremia     IBS (irritable bowel syndrome)     Pneumobilia 06/18/2022    Post laminectomy syndrome     S/P insertion of spinal cord stimulator 07/18/2018    s/p Medtronic loop recorder 3/2/2024 03/02/2024    Seasonal allergies     Shortness of breath     exertional    Spinal stenosis     Status post lumbar spinal fusion 03/16/2018    Stroke (HCC)     pt states slight stroke March 2022     Social History     Socioeconomic History     Marital status:      Spouse name: Not on file    Number of children: Not on file    Years of education: Not on file    Highest education level: Not on file   Occupational History    Occupation: Retired   Tobacco Use    Smoking status: Former     Current packs/day: 0.00     Types: Cigarettes     Quit date: 1970     Years since quittin.5    Smokeless tobacco: Never    Tobacco comments:     Denied history of current ever day smoker, Former smoker and Never smoker all documented in [employer]   Vaping Use    Vaping status: Never Used   Substance and Sexual Activity    Alcohol use: Not Currently     Comment: Denied history of alcohol use    Drug use: No     Comment: Denied history of drug use    Sexual activity: Not Currently   Other Topics Concern    Not on file   Social History Narrative    Marital History - Currently  per [employer]     Social Determinants of Health     Financial Resource Strain: Medium Risk (2022)    Overall Financial Resource Strain (CARDIA)     Difficulty of Paying Living Expenses: Somewhat hard   Food Insecurity: No Food Insecurity (6/3/2024)    Hunger Vital Sign     Worried About Running Out of Food in the Last Year: Never true     Ran Out of Food in the Last Year: Never true   Transportation Needs: No Transportation Needs (6/3/2024)    PRAPARE - Transportation     Lack of Transportation (Medical): No     Lack of Transportation (Non-Medical): No   Physical Activity: Not on file   Stress: Not on file   Social Connections: Not on file   Intimate Partner Violence: Not on file   Housing Stability: Low Risk  (6/3/2024)    Housing Stability Vital Sign     Unable to Pay for Housing in the Last Year: No     Number of Times Moved in the Last Year: 0     Homeless in the Last Year: No      Family History   Problem Relation Age of Onset    Lung cancer Mother 46    Pulmonary embolism Father     No Known Problems Sister     No Known Problems Daughter     No Known Problems Daughter      Stroke Maternal Grandmother     Heart attack Maternal Grandfather     No Known Problems Paternal Grandmother     No Known Problems Paternal Grandfather     No Known Problems Maternal Aunt     Diabetes Family         Diabetes mellitus    Hypertension Family     Stroke Family         Stroke complications     Past Surgical History:   Procedure Laterality Date    APPENDECTOMY      BACK SURGERY      BREAST CYST EXCISION Left     CARDIAC CATHETERIZATION  11/14/2023    Procedure: Cardiac catheterization;  Surgeon: Randolph Holt MD;  [facility];  Service: Cardiology    CARDIAC CATHETERIZATION N/A 11/14/2023    Procedure: Cardiac Coronary Angiogram;  Surgeon: Randolph Holt MD;  [facility];  Service: Cardiology    CARDIAC ELECTROPHYSIOLOGY PROCEDURE N/A 3/2/2024    Procedure: Cardiac loop recorder implant;  Surgeon: Edu Fontaine MD;  [facility];  Service: Cardiology    CHOLECYSTECTOMY      COLONOSCOPY      ESOPHAGOGASTRODUODENOSCOPY N/A 09/28/2016    Procedure: ESOPHAGOGASTRODUODENOSCOPY (EGD);  Surgeon: Mylene Moeller MD;  Location: [facility];  Service:     HERNIA REPAIR      HYSTERECTOMY      TTAH-BSO age 28    LAMINECTOMY      LUMBAR LAMINECTOMY      OOPHORECTOMY      age 28    MS ARTHRODESIS POSTERIOR/PSTLAT TQ 1NTRSPC THORACIC N/A 06/04/2018    Procedure: Reopening of lumbar incision for T12-L5 posterior instrumented fixation and fusion and T12-L4 posterior decompression;  Surgeon: Wood Rogel MD;  Location: [facility];  Service: Neurosurgery    MS COLONOSCOPY FLX DX W/COLLJ SPEC WHEN PFRMD N/A 03/02/2016    Procedure: EGD AND COLONOSCOPY;  Surgeon: Mylene Moeller MD;  [facility]: [facility];  Service: Gastroenterology    MS DILATION ESOPH UNGUIDED SOUND/BOUGIE 1/MULT PASS N/A 09/28/2016    Procedure: DILATATION ESOPHAGEAL;  Surgeon: Mylene Moeller MD;  [facility];  Service: Gastroenterology    MS ESOPHAGOGASTRODUODENOSCOPY TRANSORAL DIAGNOSTIC  N/A 07/18/2016    Procedure: ESOPHAGOGASTRODUODENOSCOPY (EGD);  Surgeon: Mylene Moeller MD;  [facility];  Service: Gastroenterology    OR INSJ/RPLCMT SPINAL NPG/RCVR POCKET CRTJ&CONNJ Left 06/04/2018    Procedure: removal of left buttock implantable pulse generator and placement of new  implantable pulse generator;  Surgeon: Wood Rogel MD;  [facility];  Service: Neurosurgery       Current Outpatient Medications:     albuterol (PROVENTIL HFA,VENTOLIN HFA) 90 mcg/act inhaler, Inhale 2 puffs every 6 (six) hours as needed for wheezing or shortness of breath, Disp: 6.7 g, Rfl: 5    aspirin 81 mg chewable tablet, Chew 81 mg daily, Disp: , Rfl:     atorvastatin (LIPITOR) 40 mg tablet, TAKE ONE TABLET BY MOUTH ONCE DAILY (Patient taking differently: Take 40 mg by mouth daily), Disp: 90 tablet, Rfl: 0    baclofen 10 mg tablet, Take 10 mg by mouth 2 (two) times a day as needed for muscle spasms, Disp: , Rfl:     Blood Glucose Monitoring Suppl (OneTouch Verio Reflect) w/Device KIT, Check blood sugars twice daily. Please substitute with appropriate alternative as covered by patient's insurance. Dx: E11.65, Disp: 1 kit, Rfl: 0    clopidogrel (Plavix) 75 mg tablet, Take 1 tablet (75 mg total) by mouth daily, Disp: 90 tablet, Rfl: 0    docusate sodium (COLACE) 100 mg capsule, Take 1 capsule (100 mg total) by mouth 2 (two) times a day, Disp: , Rfl:     ferrous sulfate 325 (65 Fe) mg tablet, Take 1 tablet (325 mg total) by mouth daily with breakfast, Disp: 90 tablet, Rfl: 0    furosemide (LASIX) 20 mg tablet, Take 1 tablet (20 mg total) by mouth daily, Disp: , Rfl:     glucose blood (OneTouch Verio) test strip, Check blood sugars twice daily. Please substitute with appropriate alternative as covered by patient's insurance. Dx: E11.65, Disp: 200 each, Rfl: 3    latanoprost (XALATAN) 0.005 % ophthalmic solution, Administer 1 drop to both eyes daily at bedtime, Disp: , Rfl:     MAGNESIUM OXIDE 400 PO, Take 400 mg  by mouth 2 (two) times a day, Disp: , Rfl:     meclizine (ANTIVERT) 25 mg tablet, Take 1 tablet (25 mg total) by mouth 4 (four) times a day as needed for dizziness (Patient taking differently: Take 25 mg by mouth daily as needed for dizziness), Disp: 60 tablet, Rfl: 0    metFORMIN (GLUCOPHAGE) 1000 MG tablet, Take 1 tablet (1,000 mg total) by mouth 2 (two) times a day with meals (Patient taking differently: Take 500 mg by mouth 2 (two) times a day with meals), Disp: 180 tablet, Rfl: 3    metoprolol succinate (TOPROL-XL) 50 mg 24 hr tablet, Take 1 tablet (50 mg total) by mouth every 12 (twelve) hours, Disp: 60 tablet, Rfl: 0    Milnacipran HCl (Savella) 100 MG TABS, Take 1 tablet (100 mg total) by mouth daily, Disp: 30 tablet, Rfl: 3    OneTouch Delica Lancets 33G MISC, Check blood sugars twice daily. Please substitute with appropriate alternative as covered by patient's insurance. Dx: E11.65, Disp: 200 each, Rfl: 3    oxyCODONE-acetaminophen (PERCOCET)  mg per tablet, Take 1 tablet by mouth 2 (two) times a day, Disp: , Rfl:     pantoprazole (PROTONIX) 40 mg tablet, Take 1 tablet (40 mg total) by mouth daily, Disp: , Rfl:     polyethylene glycol (MIRALAX) 17 g packet, Take 17 g by mouth daily, Disp: , Rfl:     senna (SENOKOT) 8.6 mg, Take 2 tablets (17.2 mg total) by mouth daily at bedtime, Disp: , Rfl:     trospium (SANCTURA XR) 60 mg 24 hr capsule, Take 1 capsule (60 mg total) by mouth daily before breakfast, Disp: 30 capsule, Rfl: 3    bisacodyl (DULCOLAX) 10 mg suppository, Insert 1 suppository (10 mg total) into the rectum daily as needed for constipation (Patient not taking: Reported on 6/26/2024), Disp: 12 suppository, Rfl: 0    levothyroxine 75 mcg tablet, Take 0.5 tablets (37.5 mcg total) by mouth daily in the early morning (Patient not taking: Reported on 6/3/2024), Disp: , Rfl:   Allergies   Allergen Reactions    Penicillins Anaphylaxis, Hives and Other (See Comments)     Other reaction(s): Unknown  "Reaction    Sulfa Antibiotics Anaphylaxis and Other (See Comments)     Other reaction(s): Unknown Reaction    Aspartame - Food Allergy Other (See Comments) and Hypertension     Slurred speech, weakness, stroke sx    Iodinated Contrast Media Hives     Pt has taken prep prior for contrast and has not had any break through reaction    Keflex [Cephalexin] Hives       Labs:     Chemistry        Component Value Date/Time     04/30/2015 0752    K 4.8 06/05/2024 0510    K 4.6 12/18/2023 1108     06/05/2024 0510    CL 99 (L) 12/18/2023 1108    CO2 31 06/05/2024 0510    CO2 30 12/18/2023 1108    BUN 24 06/05/2024 0510    BUN 31 (H) 12/18/2023 1108    CREATININE 1.09 06/05/2024 0510    CREATININE 1.55 (H) 12/18/2023 1108        Component Value Date/Time    CALCIUM 9.4 06/05/2024 0510    CALCIUM 9.1 12/18/2023 1108    ALKPHOS 115 (H) 05/31/2024 0331    ALKPHOS 78 12/18/2023 1108    AST 18 05/31/2024 0331    AST 19 12/18/2023 1108    ALT 10 05/31/2024 0331    ALT 17 12/18/2023 1108    BILITOT 0.4 04/30/2015 0752            Lab Results   Component Value Date    CHOL 165 04/30/2015    CHOL 208 03/20/2014     Lab Results   Component Value Date    HDL 42 (L) 11/26/2023    HDL 50 11/11/2023    HDL 48 (L) 06/09/2023     Lab Results   Component Value Date    LDLCALC 42 11/26/2023    LDLCALC 62 11/11/2023    LDLCALC 51 06/09/2023     Lab Results   Component Value Date    TRIG 179 (H) 11/26/2023    TRIG 84 11/11/2023    TRIG 166 (H) 06/09/2023     Imaging: Cardiac EP device report    Result Date: 6/18/2024  Narrative: JACOB LNQ22 ICM - ACTIVE SYSTEM IS MRI CONDITIONAL CARELINK TRANSMISSION: LOOP RECORDER. PRESENTING RHYTHM NSR @ 84 BPM. BATTERY STATUS \"OK.\" NO PATIENT OR DEVICE ACTIVATED EPISODES. NORMAL DEVICE FUNCTION. DL     Cardiac EP device report    Result Date: 6/18/2024  Narrative: JACOB LNQ22 ICM - ACTIVE SYSTEM IS MRI CONDITIONAL CARELINK TRANSMISSION: LOOP RECORDER. PRESENTING RHYTHM NSR @ 84 BPM. BATTERY STATUS \"OK.\" " NO PATIENT OR DEVICE ACTIVATED EPISODES. NORMAL DEVICE FUNCTION. DL     Review of Systems   Constitutional: Negative for chills, diaphoresis, fever, malaise/fatigue and weight gain.   Cardiovascular:  Positive for leg swelling. Negative for chest pain, dyspnea on exertion, irregular heartbeat, near-syncope, orthopnea, palpitations, paroxysmal nocturnal dyspnea and syncope.   Respiratory:  Negative for cough, shortness of breath, sleep disturbances due to breathing, snoring and wheezing.    Skin:  Negative for rash.   Gastrointestinal:  Positive for bloating. Negative for abdominal pain and nausea.   Neurological:  Negative for dizziness and light-headedness.       Vitals:    06/26/24 1106   BP: 100/50   Pulse: 68     Vitals:    06/26/24 1106   Weight: 81.2 kg (179 lb)         Body mass index is 34.96 kg/m².    Physical Exam  Constitutional:       General: She is not in acute distress.     Appearance: Normal appearance. She is not ill-appearing.   HENT:      Head: Normocephalic and atraumatic.      Nose: Nose normal.      Mouth/Throat:      Mouth: Mucous membranes are moist.   Eyes:      General: No scleral icterus.  Cardiovascular:      Rate and Rhythm: Normal rate and regular rhythm.      Pulses:           Radial pulses are 2+ on the right side and 2+ on the left side.      Heart sounds: No murmur heard.     No friction rub. No gallop. No S3 or S4 sounds.   Pulmonary:      Effort: Pulmonary effort is normal. No respiratory distress.      Breath sounds: Normal breath sounds. No stridor. No wheezing, rhonchi or rales.   Abdominal:      General: Abdomen is flat.   Musculoskeletal:         General: Swelling (+2 bilateral pitting edema to at least the knee) present.      Right lower leg: Edema present.      Left lower leg: Edema present.   Skin:     General: Skin is warm.      Capillary Refill: Capillary refill takes less than 2 seconds.   Neurological:      Mental Status: She is alert. Mental status is at baseline.    Psychiatric:         Thought Content: Thought content normal.

## 2024-06-25 PROBLEM — I50.32 CHRONIC DIASTOLIC CONGESTIVE HEART FAILURE (HCC): Status: ACTIVE | Noted: 2024-05-22

## 2024-06-25 NOTE — ASSESSMENT & PLAN NOTE
- [facility] 11/14/23: diffuse calcified CAD, no focal significant lesions, OM1 70% stenosed, iFR nonsignificant   - beta blocker: Toprol- XL 50 mg BID  - antiplatelet: ASA 81 mg daily     Denies anginal symptoms.

## 2024-06-25 NOTE — ASSESSMENT & PLAN NOTE
- November 2023: CTA demonstrated moderate stenosis of distal right MCA M1, focal moderate stenosis involving the inferior basilar artery with infundibular dilation at the right ICA origin   - antiplatelet: Plavix 75 mg daily   - statin: atorvastatin 80 mg daily   - loop interrogation 06/18/24: NSR no patient or device  activated episodes     Interrogation in March showed 2 episodes of AF. Anticoagulation per primary cardiologist.

## 2024-06-25 NOTE — ASSESSMENT & PLAN NOTE
- echo 05/23/24: LVEF 70%, grade I diastolic dysfunction, mild LVOT obstruction, moderate annular calcification, mild TR  - discharge weight: 166 lbs (at lowest weight in hospital 05/30/24)  - office weight: 179 lbs   - Neurohormonal Blockade:   -- beta blocker: Toprol- XL 50 mg BID   -- ACE/ARB/ARNi: none   -- aldosterone antagonist: none    -- diuretic: furosemide 20 mg daily     Pt is volume overloaded today with at least a 10-13 lb weight gain. Discussed increasing her diuretic to 40 mg daily and decreasing her metoprolol to 50 mg daily. Pt wants to discuss with her daughter who is a MD. She would rather not start diuretics at this time. She wants to try compression boots. Discussed the risks that compression may worsen the heart failure due to distributing fluid elsewhere other than her legs. She is concerned with worsening her kidney function.   - continue current regimen, continue daily weights  - call out office if she decides to make any changes to her regimen  - follow up in one month

## 2024-06-25 NOTE — ASSESSMENT & PLAN NOTE
- lipid panel 11/26/23: cholesterol 120, triglycerides 179, HDL 42, LDL 42  - stain: atorvastatin 80 mg daily

## 2024-06-25 NOTE — ASSESSMENT & PLAN NOTE
- BP in office today: 100/60 mmHg  - hypertensive regimen: Toprol- XL 50 mg BID    Recommended decreasing beta blocker to 50 mg daily. Pt will call back after discussing with her daughter.

## 2024-06-26 ENCOUNTER — OFFICE VISIT (OUTPATIENT)
Dept: CARDIOLOGY CLINIC | Facility: CLINIC | Age: 79
End: 2024-06-26
Payer: MEDICARE

## 2024-06-26 VITALS
DIASTOLIC BLOOD PRESSURE: 50 MMHG | WEIGHT: 179 LBS | HEART RATE: 68 BPM | BODY MASS INDEX: 34.96 KG/M2 | SYSTOLIC BLOOD PRESSURE: 100 MMHG

## 2024-06-26 DIAGNOSIS — R29.90 STROKE-LIKE SYMPTOMS: ICD-10-CM

## 2024-06-26 DIAGNOSIS — E66.9 OBESITY, CLASS I, BMI 30-34.9: ICD-10-CM

## 2024-06-26 DIAGNOSIS — I10 PRIMARY HYPERTENSION: ICD-10-CM

## 2024-06-26 DIAGNOSIS — E78.00 HYPERCHOLESTEROLEMIA: ICD-10-CM

## 2024-06-26 DIAGNOSIS — I50.32 CHRONIC DIASTOLIC CONGESTIVE HEART FAILURE (HCC): Primary | ICD-10-CM

## 2024-06-26 DIAGNOSIS — I25.10 CORONARY ARTERY DISEASE INVOLVING NATIVE CORONARY ARTERY OF NATIVE HEART WITHOUT ANGINA PECTORIS: ICD-10-CM

## 2024-06-26 PROCEDURE — 99214 OFFICE O/P EST MOD 30 MIN: CPT

## 2024-06-27 ENCOUNTER — TELEPHONE (OUTPATIENT)
Dept: CARDIOLOGY CLINIC | Facility: CLINIC | Age: 79
End: 2024-06-27

## 2024-06-27 NOTE — TELEPHONE ENCOUNTER
Patient had follow up with Rukhsana yesterday, weight up from end of May discharge, recommend increasing furosemide, but patient wanted to discuss with her daughter, whom is a physician.   Scheduled follow up 7/24/24    Spoke with patient, states she is feeling well, her weight is the same as yesterday. No SOB, chest pain or dizziness. States she will be discussing increasing diuretic with her daughter this weekend when she sees her.   Advised will call next week for update, advised to call with questions or concerns prior to that appointment.

## 2024-06-28 ENCOUNTER — PATIENT OUTREACH (OUTPATIENT)
Dept: FAMILY MEDICINE CLINIC | Facility: CLINIC | Age: 79
End: 2024-06-28

## 2024-06-28 NOTE — PROGRESS NOTES
"Spoke with Mattie reports feeling off today. Legs hurt. They are swollen. Mattie does elevate legs when she is sitting Has nocturia is every hour. \" I dont sleep much\"  Denies shortness of breath. She will talk with her daughter this weekend about increasing diuretic.  Weight today 180 pounds same as yesterday she reports.    No questions or concerns. I will check back next week   "

## 2024-07-03 ENCOUNTER — PATIENT OUTREACH (OUTPATIENT)
Dept: FAMILY MEDICINE CLINIC | Facility: CLINIC | Age: 79
End: 2024-07-03

## 2024-07-03 NOTE — PROGRESS NOTES
Left message with my contact information for patient to return my call  30 day HF  is over Will close from care management at this time

## 2024-07-05 ENCOUNTER — TELEPHONE (OUTPATIENT)
Dept: CARDIOLOGY CLINIC | Facility: CLINIC | Age: 79
End: 2024-07-05

## 2024-07-05 NOTE — TELEPHONE ENCOUNTER
Spoke with patient, states her weight is up to 182.5 lbs today, states that is the highest weight. Her weight at visit 6/26 was 179 lbs.  States she developed 5 new blisters on her legs, her legs are very swollen and painful. States she hasn't slept in 2 days due to the pain. States she did have DUNBAR once when walking outside with her therapist, but now she is good.   Asked if she spoke to her daughter about increasing diuretic. States they are not doing anything until seen by nephrologist, due to her kidney status. States she does not have appointment scheduled yet. States the [facility] [employer] RN will be in Monday and hopefully will have an appointment.    Patient continues to take lasix 20 mg daily.    Patient does have follow up with us 7/24/24 and will be seeing vascular 8/7/24

## 2024-07-12 ENCOUNTER — TELEPHONE (OUTPATIENT)
Dept: CARDIOLOGY CLINIC | Facility: CLINIC | Age: 79
End: 2024-07-12

## 2024-07-12 NOTE — TELEPHONE ENCOUNTER
Spoke with patient, states her weight has been steady at 179 lbs. Her left leg swelling is a little better.   States that she has to walk a little further to get to the lunch room, and the last 2 days has been having some issues with breathing.   Today she states she had a BM and is now having belly pain, has a heating pad on and is hoping it will get better.  Patient continues to only take Lasix 20 mg daily, advised per Rukhsana and DR Valdes, they recommend increasing Lasix to twice daily.   Patient states they told her she does have appointment with nephrology, but she doesn't know the date. Advised when I look at her appointment desk I don't see an appointment. She will check on it.   Patient has follow up with Rukhsana 7/24/24    Advised will call next week for another update.    Advised if she continues with belly pain or increased DUNBAR to notify physician. Patient agreeable.     Dr Valdes and Rukhsana aware of above.

## 2024-07-19 ENCOUNTER — TELEPHONE (OUTPATIENT)
Dept: CARDIOLOGY CLINIC | Facility: CLINIC | Age: 79
End: 2024-07-19

## 2024-07-19 NOTE — TELEPHONE ENCOUNTER
Left message for patient, requesting call back for update. Phone # provided  Reminded patient of appointment 7/24/24 at 11 am with Rukhsana in the [place] office.

## 2024-07-23 NOTE — PROGRESS NOTES
Mattie Varela  1945  [id number]  [address] [id number] [phone]    1. Chronic diastolic congestive heart failure (HCC)        2. Primary hypertension        3. Coronary artery disease involving native coronary artery of native heart without angina pectoris        4. Stroke-like symptoms            1. Chronic diastolic congestive heart failure (HCC)  Assessment & Plan:  -  echo 05/23/24: LVEF 70%, grade I diastolic dysfunction, mild LVOT obstruction, moderate annular calcification, mild TR  - discharge weight: 166 lbs (at lowest weight in hospital 05/30/24)  - office weight 06/26/24: 179 lbs   - office weight today: 176 lbs  - Neurohormonal Blockade:  -- beta blocker: Toprol- XL 50 mg BID  -- ACE/ARB/ARNi: none   -- aldosterone antagonist: none   -- diuretic: furosemide 20 mg daily     Pt looks euvolemic. Her legs are wrapped so it is hard to look at overall fluid status. I encouraged her to not keep her legs wrapped continuously for one week straight as this can distribute the fluid somewhere else in her body. She does not want to increase her lasix at this time.  - continue current regimen  - encouraged daily weights and low salt diet   - encouraged her to call out office with a weight gain > 3 lbs in one day or > 5 lbs in one month   2. Primary hypertension  Assessment & Plan:  - BP in office today: 128/60 mmHg  - hypertensive regimen: Toprol- XL 50 mg BID    Controlled, continue current regimen  3. Coronary artery disease involving native coronary artery of native heart without angina pectoris  Assessment & Plan:  - [facility] 11/14/23: diffuse calcified CAD, no focal significant lesions, OM1 70% stenosed, iFR nonsignificant   - beta blocker: Toprol- XL 50 mg BID  - antiplatelet: ASA 81 mg daily    Denies anginal chest pain, continue current regimen  4. Stroke-like symptoms  Assessment & Plan:  - November 2023: CTA demonstrated  moderate stenosis of distal right MCA M1, focal moderate stenosis involving the inferior basilar artery with infundibular dilation at the right ICA origin   - antiplatelet: Plavix 75 mg daily   - statin: atorvastatin 80 mg daily   - loop interrogation 06/18/24: NSR no patient or device activated episodes (Interrogation in March showed 2 episodes of AF)    Anticoagulation per primary cardiologist.     RTO 10/28/24 with Dr. Valdes     Prior cardiac testing  Echo 05/23/24: LVEF 70%, grade 1 diastolic dysfunction, mild LVOT obstruction, moderate mitral annular calcification, mild TR  Cardiac catheterization: nonobstructive CAD   NM stress test 05/21/18: no perfusion defects, LVEF 69%, negative for ischemia     Interval History:   This is a 80 y/o female with a PMH of nonobstructive CAD, chronic HFpEF, stroke like symptoms, and HTN who is presenting today for follow up. Pt last seen in office 06/26/24 by me for acute CHF hospital follow up. At this visit, she was noted to be volume up from her discharge in May (10-13 lbs). She had 2-3 + bilateral leg edema and admitted to abdominal bloating. Dr. Valdes and I both advised the patient to increase her lasix to 40 mg daily and decreasing her Toprol-XL to 50 mg daily. Pt did not want to make any changes at this visit and wanted to talk it over with her daughter. Pt is part of the the heart failure program and did state her leg are very edematous to the point where they were weeping. She discussed with her daughter and they want to see a nephrologist before increasing her diuretic dose.    Pt states she feels like her legs have gone down significantly with the swelling since her last visit. She has visiting nurses come once a week to wrap her legs. The wrap stays on her legs for one week straight. She is taking sponge baths because she does not get the wrap wet. Patient states towards the end of the week her legs are very itchy and irritated. I explained to patient that the  wrap should not be staying on her legs for one week straight and it usually comes off at night to help her legs breath and be exposed to moisture. Her meals are provided by the facility so she does not control the amount of salt she puts in her food. She has a nephrology appointment scheduled on 09/26/24.  She is weighing herself daily and staying stable ~ 178 lbs. She admits to some DUNBAR but believes this is related to her allergies. She states taking her inhaler helps her breath better. I explained to patient that our office needs to know of the SOB is getting worse as this may indicate fluid build up in the lungs.     Past Medical History:   Diagnosis Date    Acid reflux     Acute kidney injury (HCC)     Anemia     hx of iron-deficient    Anxiety     Arthritis     Asthma     last needed inhaler last year 2020    Basal cell carcinoma     upper lip    Chronic narcotic dependence (HCC)     Chronic pain     Colon polyp     Cystocele     Diabetes mellitus (HCC)     stable    Disease of thyroid gland     hypothyroidism    Diverticulosis     Dizziness     at times    Dysfunctional uterine bleeding     last assessed - [id number]    Dysphagia     Fibromyalgia     Gastric ulcer     Gastroparesis     History of colonic polyps     last assessed - [id number]    History of gastroesophageal reflux (GERD)     Hypercholesterolemia     Hyperlipidemia     Hypertension     Hyponatremia     IBS (irritable bowel syndrome)     Pneumobilia 06/18/2022    Post laminectomy syndrome     S/P insertion of spinal cord stimulator 07/18/2018    s/p [employer] loop recorder 3/2/2024 03/02/2024    Seasonal allergies     Shortness of breath     exertional    Spinal stenosis     Status post lumbar spinal fusion 03/16/2018    Stroke ([facility])     pt states slight stroke March 2022     Social History     Socioeconomic History    Marital status:      Spouse name: Not on file    Number of children: Not on file    Years of education: Not on file     Highest education level: Not on file   Occupational History    Occupation: Retired   Tobacco Use    Smoking status: Former     Current packs/day: 0.00     Types: Cigarettes     Quit date: 1970     Years since quittin.5    Smokeless tobacco: Never    Tobacco comments:     Denied history of current ever day smoker, Former smoker and Never smoker all documented in [employer]   Vaping Use    Vaping status: Never Used   Substance and Sexual Activity    Alcohol use: Not Currently     Comment: Denied history of alcohol use    Drug use: No     Comment: Denied history of drug use    Sexual activity: Not Currently   Other Topics Concern    Not on file   Social History Narrative    Marital History - Currently  per [employer]     Social Determinants of Health     Financial Resource Strain: Medium Risk (2022)    Overall Financial Resource Strain (CARDIA)     Difficulty of Paying Living Expenses: Somewhat hard   Food Insecurity: No Food Insecurity (6/3/2024)    Hunger Vital Sign     Worried About Running Out of Food in the Last Year: Never true     Ran Out of Food in the Last Year: Never true   Transportation Needs: No Transportation Needs (6/3/2024)    PRAPARE - Transportation     Lack of Transportation (Medical): No     Lack of Transportation (Non-Medical): No   Physical Activity: Not on file   Stress: Not on file   Social Connections: Not on file   Intimate Partner Violence: Not on file   Housing Stability: Low Risk  (6/3/2024)    Housing Stability Vital Sign     Unable to Pay for Housing in the Last Year: No     Number of Times Moved in the Last Year: 0     Homeless in the Last Year: No      Family History   Problem Relation Age of Onset    Lung cancer Mother 46    Pulmonary embolism Father     No Known Problems Sister     No Known Problems Daughter     No Known Problems Daughter     Stroke Maternal Grandmother     Heart attack Maternal Grandfather     No Known Problems Paternal Grandmother     No Known  Problems Paternal Grandfather     No Known Problems Maternal Aunt     Diabetes Family         Diabetes mellitus    Hypertension Family     Stroke Family         Stroke complications     Past Surgical History:   Procedure Laterality Date    APPENDECTOMY      BACK SURGERY      BREAST CYST EXCISION Left     CARDIAC CATHETERIZATION  11/14/2023    Procedure: Cardiac catheterization;  Surgeon: Randolph Holt MD;  [facility];  Service: Cardiology    CARDIAC CATHETERIZATION N/A 11/14/2023    Procedure: Cardiac Coronary Angiogram;  Surgeon: Randolph Holt MD;  [facility];  Service: Cardiology    CARDIAC ELECTROPHYSIOLOGY PROCEDURE N/A 3/2/2024    Procedure: Cardiac loop recorder implant;  Surgeon: Edu Fontaine MD;  [facility];  Service: Cardiology    CHOLECYSTECTOMY      COLONOSCOPY      ESOPHAGOGASTRODUODENOSCOPY N/A 09/28/2016    Procedure: ESOPHAGOGASTRODUODENOSCOPY (EGD);  Surgeon: Mylene Moeller MD;  Location: [facility];  Service:     HERNIA REPAIR      HYSTERECTOMY      TTAH-BSO age 28    LAMINECTOMY      LUMBAR LAMINECTOMY      OOPHORECTOMY      age 28    AR ARTHRODESIS POSTERIOR/PSTLAT TQ 1NTRSPC THORACIC N/A 06/04/2018    Procedure: Reopening of lumbar incision for T12-L5 posterior instrumented fixation and fusion and T12-L4 posterior decompression;  Surgeon: Wood Rogel MD;  [facility];  Service: Neurosurgery    AR COLONOSCOPY FLX DX W/COLLJ SPEC WHEN PFRMD N/A 03/02/2016    Procedure: EGD AND COLONOSCOPY;  Surgeon: Mylene Moeller MD;  [facility];  Service: Gastroenterology    AR DILATION ESOPH UNGUIDED SOUND/BOUGIE 1/MULT PASS N/A 09/28/2016    Procedure: DILATATION ESOPHAGEAL;  Surgeon: Mylene Moeller MD;  [facility];  Service: Gastroenterology    AR ESOPHAGOGASTRODUODENOSCOPY TRANSORAL DIAGNOSTIC N/A 07/18/2016    Procedure: ESOPHAGOGASTRODUODENOSCOPY (EGD);  Surgeon: Mylene Moeller MD;  [facility];  Service:  Gastroenterology    RI INSJ/RPLCMT SPINAL NPG/RCVR POCKET CRTJ&CONNJ Left 06/04/2018    Procedure: removal of left buttock implantable pulse generator and placement of new  implantable pulse generator;  Surgeon: Wood Rogel MD;  [facility];  Service: Neurosurgery       Current Outpatient Medications:     albuterol (PROVENTIL HFA,VENTOLIN HFA) 90 mcg/act inhaler, Inhale 2 puffs every 6 (six) hours as needed for wheezing or shortness of breath, Disp: 6.7 g, Rfl: 5    aspirin 81 mg chewable tablet, Chew 81 mg daily, Disp: , Rfl:     atorvastatin (LIPITOR) 40 mg tablet, TAKE ONE TABLET BY MOUTH ONCE DAILY (Patient taking differently: Take 40 mg by mouth daily), Disp: 90 tablet, Rfl: 0    baclofen 10 mg tablet, Take 10 mg by mouth 2 (two) times a day as needed for muscle spasms, Disp: , Rfl:     Blood Glucose Monitoring Suppl (OneTouch Verio Reflect) w/Device KIT, Check blood sugars twice daily. Please substitute with appropriate alternative as covered by patient's insurance. Dx: E11.65, Disp: 1 kit, Rfl: 0    clopidogrel (Plavix) 75 mg tablet, Take 1 tablet (75 mg total) by mouth daily, Disp: 90 tablet, Rfl: 0    docusate sodium (COLACE) 100 mg capsule, Take 1 capsule (100 mg total) by mouth 2 (two) times a day (Patient taking differently: Take 100 mg by mouth 2 (two) times a day as needed), Disp: , Rfl:     ferrous sulfate 325 (65 Fe) mg tablet, Take 1 tablet (325 mg total) by mouth daily with breakfast, Disp: 90 tablet, Rfl: 0    furosemide (LASIX) 20 mg tablet, Take 1 tablet (20 mg total) by mouth daily, Disp: , Rfl:     glucose blood (OneTouch Verio) test strip, Check blood sugars twice daily. Please substitute with appropriate alternative as covered by patient's insurance. Dx: E11.65, Disp: 200 each, Rfl: 3    latanoprost (XALATAN) 0.005 % ophthalmic solution, Administer 1 drop to both eyes daily at bedtime, Disp: , Rfl:     levothyroxine 75 mcg tablet, Take 0.5 tablets (37.5 mcg total) by mouth daily in  the early morning, Disp: , Rfl:     MAGNESIUM OXIDE 400 PO, Take 400 mg by mouth 2 (two) times a day, Disp: , Rfl:     meclizine (ANTIVERT) 25 mg tablet, Take 1 tablet (25 mg total) by mouth 4 (four) times a day as needed for dizziness (Patient taking differently: Take 25 mg by mouth daily as needed for dizziness), Disp: 60 tablet, Rfl: 0    metFORMIN (GLUCOPHAGE) 1000 MG tablet, Take 1 tablet (1,000 mg total) by mouth 2 (two) times a day with meals (Patient taking differently: Take 500 mg by mouth 2 (two) times a day with meals), Disp: 180 tablet, Rfl: 3    metoprolol succinate (TOPROL-XL) 50 mg 24 hr tablet, Take 1 tablet (50 mg total) by mouth every 12 (twelve) hours, Disp: 60 tablet, Rfl: 0    Milnacipran HCl (Savella) 100 MG TABS, Take 1 tablet (100 mg total) by mouth daily, Disp: 30 tablet, Rfl: 3    OneTouch Delica Lancets 33G MISC, Check blood sugars twice daily. Please substitute with appropriate alternative as covered by patient's insurance. Dx: E11.65, Disp: 200 each, Rfl: 3    oxyCODONE-acetaminophen (PERCOCET)  mg per tablet, Take 1 tablet by mouth 2 (two) times a day, Disp: , Rfl:     pantoprazole (PROTONIX) 40 mg tablet, Take 1 tablet (40 mg total) by mouth daily, Disp: , Rfl:     trospium (SANCTURA XR) 60 mg 24 hr capsule, Take 1 capsule (60 mg total) by mouth daily before breakfast, Disp: 30 capsule, Rfl: 3    polyethylene glycol (MIRALAX) 17 g packet, Take 17 g by mouth daily (Patient not taking: Reported on 7/24/2024), Disp: , Rfl:     senna (SENOKOT) 8.6 mg, Take 2 tablets (17.2 mg total) by mouth daily at bedtime (Patient not taking: Reported on 7/24/2024), Disp: , Rfl:   Allergies   Allergen Reactions    Penicillins Anaphylaxis, Hives and Other (See Comments)     Other reaction(s): Unknown Reaction    Sulfa Antibiotics Anaphylaxis and Other (See Comments)     Other reaction(s): Unknown Reaction    Aspartame - Food Allergy Other (See Comments) and Hypertension     Slurred speech, weakness,  stroke sx    Iodinated Contrast Media Hives     Pt has taken prep prior for contrast and has not had any break through reaction    Keflex [Cephalexin] Hives       Labs:     Chemistry        Component Value Date/Time     04/30/2015 0752    K 4.8 06/05/2024 0510    K 4.6 12/18/2023 1108     06/05/2024 0510    CL 99 (L) 12/18/2023 1108    CO2 31 06/05/2024 0510    CO2 30 12/18/2023 1108    BUN 24 06/05/2024 0510    BUN 31 (H) 12/18/2023 1108    CREATININE 1.09 06/05/2024 0510    CREATININE 1.55 (H) 12/18/2023 1108        Component Value Date/Time    CALCIUM 9.4 06/05/2024 0510    CALCIUM 9.1 12/18/2023 1108    ALKPHOS 115 (H) 05/31/2024 0331    ALKPHOS 78 12/18/2023 1108    AST 18 05/31/2024 0331    AST 19 12/18/2023 1108    ALT 10 05/31/2024 0331    ALT 17 12/18/2023 1108    BILITOT 0.4 04/30/2015 0752            Lab Results   Component Value Date    CHOL 165 04/30/2015    CHOL 208 03/20/2014     Lab Results   Component Value Date    HDL 42 (L) 11/26/2023    HDL 50 11/11/2023    HDL 48 (L) 06/09/2023     Lab Results   Component Value Date    LDLCALC 42 11/26/2023    LDLCALC 62 11/11/2023    LDLCALC 51 06/09/2023     Lab Results   Component Value Date    TRIG 179 (H) 11/26/2023    TRIG 84 11/11/2023    TRIG 166 (H) 06/09/2023     Imaging: No results found.    Review of Systems   Constitutional: Negative for chills, diaphoresis, fever, malaise/fatigue and weight gain.   Cardiovascular:  Negative for chest pain, dyspnea on exertion, irregular heartbeat, leg swelling, near-syncope, orthopnea, palpitations, paroxysmal nocturnal dyspnea and syncope.   Respiratory:  Negative for cough, shortness of breath, sleep disturbances due to breathing, snoring and wheezing.    Skin:  Negative for rash.   Gastrointestinal:  Negative for bloating, abdominal pain and nausea.   Neurological:  Negative for dizziness and light-headedness.       Vitals:    07/24/24 1035   BP: 128/60   Pulse: 84   SpO2: 99%     Vitals:    07/24/24  1035   Weight: 79.8 kg (176 lb)     Height: 5' (152.4 cm)   Body mass index is 34.37 kg/m².    Physical Exam  Constitutional:       General: She is not in acute distress.     Appearance: Normal appearance. She is not ill-appearing.   HENT:      Head: Normocephalic and atraumatic.      Nose: Nose normal.      Mouth/Throat:      Mouth: Mucous membranes are moist.   Eyes:      General: No scleral icterus.  Cardiovascular:      Rate and Rhythm: Normal rate and regular rhythm.      Pulses:           Radial pulses are 2+ on the right side and 2+ on the left side.      Heart sounds: No murmur heard.     No friction rub. No gallop. No S3 or S4 sounds.   Pulmonary:      Effort: Pulmonary effort is normal. No respiratory distress.      Breath sounds: Normal breath sounds. No stridor. No wheezing, rhonchi or rales.   Abdominal:      General: Abdomen is flat.   Musculoskeletal:         General: No swelling.      Right lower leg: No edema.      Left lower leg: No edema.   Skin:     General: Skin is warm.      Capillary Refill: Capillary refill takes less than 2 seconds.      Comments: ACE bandages + compression stocking on bilateral legs. Skin was very dry when I peeled down the top of the ACE bandage    Neurological:      Mental Status: She is alert. Mental status is at baseline.   Psychiatric:         Thought Content: Thought content normal.

## 2024-07-23 NOTE — ASSESSMENT & PLAN NOTE
- [facility] 11/14/23: diffuse calcified CAD, no focal significant lesions, OM1 70% stenosed, iFR nonsignificant   - beta blocker: Toprol- XL 50 mg BID  - antiplatelet: ASA 81 mg daily    Denies anginal chest pain, continue current regimen

## 2024-07-23 NOTE — ASSESSMENT & PLAN NOTE
- November 2023: CTA demonstrated moderate stenosis of distal right MCA M1, focal moderate stenosis involving the inferior basilar artery with infundibular dilation at the right ICA origin   - antiplatelet: Plavix 75 mg daily   - statin: atorvastatin 80 mg daily   - loop interrogation 06/18/24: NSR no patient or device activated episodes (Interrogation in March showed 2 episodes of AF)    Anticoagulation per primary cardiologist.

## 2024-07-23 NOTE — ASSESSMENT & PLAN NOTE
-  echo 05/23/24: LVEF 70%, grade I diastolic dysfunction, mild LVOT obstruction, moderate annular calcification, mild TR  - discharge weight: 166 lbs (at lowest weight in hospital 05/30/24)  - office weight 06/26/24: 179 lbs   - office weight today: 176 lbs  - Neurohormonal Blockade:  -- beta blocker: Toprol- XL 50 mg BID  -- ACE/ARB/ARNi: none   -- aldosterone antagonist: none   -- diuretic: furosemide 20 mg daily     Pt looks euvolemic. Her legs are wrapped so it is hard to look at overall fluid status. I encouraged her to not keep her legs wrapped continuously for one week straight as this can distribute the fluid somewhere else in her body. She does not want to increase her lasix at this time.  - continue current regimen  - encouraged daily weights and low salt diet   - encouraged her to call out office with a weight gain > 3 lbs in one day or > 5 lbs in one month

## 2024-07-23 NOTE — ASSESSMENT & PLAN NOTE
- BP in office today: 128/60 mmHg  - hypertensive regimen: Toprol- XL 50 mg BID    Controlled, continue current regimen

## 2024-07-24 ENCOUNTER — OFFICE VISIT (OUTPATIENT)
Dept: CARDIOLOGY CLINIC | Facility: CLINIC | Age: 79
End: 2024-07-24
Payer: MEDICARE

## 2024-07-24 VITALS
OXYGEN SATURATION: 99 % | SYSTOLIC BLOOD PRESSURE: 128 MMHG | BODY MASS INDEX: 34.55 KG/M2 | WEIGHT: 176 LBS | DIASTOLIC BLOOD PRESSURE: 60 MMHG | HEART RATE: 84 BPM | HEIGHT: 60 IN

## 2024-07-24 DIAGNOSIS — I25.10 CORONARY ARTERY DISEASE INVOLVING NATIVE CORONARY ARTERY OF NATIVE HEART WITHOUT ANGINA PECTORIS: ICD-10-CM

## 2024-07-24 DIAGNOSIS — I50.32 CHRONIC DIASTOLIC CONGESTIVE HEART FAILURE (HCC): Primary | ICD-10-CM

## 2024-07-24 DIAGNOSIS — I10 PRIMARY HYPERTENSION: ICD-10-CM

## 2024-07-24 DIAGNOSIS — R29.90 STROKE-LIKE SYMPTOMS: ICD-10-CM

## 2024-07-24 PROCEDURE — 99214 OFFICE O/P EST MOD 30 MIN: CPT

## 2024-08-07 ENCOUNTER — CONSULT (OUTPATIENT)
Dept: VASCULAR SURGERY | Facility: CLINIC | Age: 79
End: 2024-08-07
Payer: MEDICARE

## 2024-08-07 VITALS
BODY MASS INDEX: 35.14 KG/M2 | HEIGHT: 60 IN | DIASTOLIC BLOOD PRESSURE: 72 MMHG | HEART RATE: 66 BPM | OXYGEN SATURATION: 99 % | SYSTOLIC BLOOD PRESSURE: 118 MMHG | WEIGHT: 179 LBS

## 2024-08-07 DIAGNOSIS — R60.0 LOWER EXTREMITY EDEMA: Primary | ICD-10-CM

## 2024-08-07 PROCEDURE — 99214 OFFICE O/P EST MOD 30 MIN: CPT | Performed by: SURGERY

## 2024-08-07 NOTE — PROGRESS NOTES
Ambulatory Visit  Name: Mattie Varela      : 1945      MRN: [id number]  Encounter Provider: Torey Dixon MD  Encounter Date: 2024   Enc[facility] department: [facility] [place]    Assessment & Plan   1. Lower extremity edema  Assessment & Plan:  79-year-old female presented to the office with complaints of bilateral lower extremity edema and wounds that occurred 6 months ago.  Patient states the wounds at this time have fully healed.  She does have a small scratch on the anterior right lower leg which occurred recently but otherwise no other complaints.  Patient has been wearing compression stockings and reports significant improvement in her leg edema.  Does try to elevate legs when she is at home.  She is limited in her physical activity and uses a walker to ambulate.    I discussed with patient that likely she does have venous insufficiency based on physical exam.  There is some mild edema of the bilateral lower extremities as well as hyperpigmentation and some scaling.  We discussed that wound healing will be likely slow with her and our first recommendation is for conservative measures prior to pursuing any intervention.     I reviewed the patient's venous duplex that she had which shows no DVT bilaterally.  This unfortunately is not an insufficiency study so not able to evaluate for reflux.  If the patient does have recurrent wounds or nonhealing wound can consider at that time a insufficiency study and a discussion of potential intervention.    For now recommend she continue conservative measures as she has been doing.  Patient can follow-up with me as needed      History of Present Illness       Mattie Varela is a 79 y.o. female    Patient is new here to our office. Patient had a LEV done 2024. She c/o B/L LE swelling. She wears compression stockings daily. Patient admits that the swelling has subsided. She also c/o blisters and drainage on both lower legs that has  also subsided. Patient reports that she uses moisturizer on both of her legs. She denies any pain in her legs.  Patient is taking ASA 81 mg, Plavix and Atorvastatin. Patient is a former smoker.     Review of Systems   Constitutional: Negative.    HENT:  Positive for rhinorrhea.    Eyes: Negative.    Respiratory: Negative.     Cardiovascular: Negative.    Gastrointestinal: Negative.    Endocrine: Negative.    Genitourinary: Negative.    Musculoskeletal: Negative.    Skin: Negative.    Allergic/Immunologic: Positive for environmental allergies.   Neurological: Negative.    Hematological: Negative.    Psychiatric/Behavioral: Negative.       Medical History Reviewed by provider this encounter:  Tobacco  Allergies  Meds  Problems  Med Hx  Surg Hx  Fam Hx       Objective     /72 (BP Location: Right arm, Patient Position: Sitting, Cuff Size: Standard)   Pulse 66   Ht 5' (1.524 m)   Wt 81.2 kg (179 lb)   LMP  (LMP Unknown)   SpO2 99%   BMI 34.96 kg/m²     Physical Exam  Vitals and nursing note reviewed.   Constitutional:       General: She is not in acute distress.     Appearance: She is well-developed.   HENT:      Head: Normocephalic and atraumatic.   Eyes:      Conjunctiva/sclera: Conjunctivae normal.   Cardiovascular:      Rate and Rhythm: Normal rate and regular rhythm.   Pulmonary:      Effort: Pulmonary effort is normal.      Breath sounds: Normal breath sounds.   Abdominal:      Palpations: Abdomen is soft.      Tenderness: There is no abdominal tenderness.   Musculoskeletal:         General: Swelling present.      Cervical back: Neck supple.   Skin:     Capillary Refill: Capillary refill takes less than 2 seconds.      Comments: Bilateral distal lower leg hyperpigmentation and scaling   Neurological:      Mental Status: She is alert.   Psychiatric:         Mood and Affect: Mood normal.       Administrative Statements   I have spent a total time of 30 minutes in caring for this patient on the day  of the visit/encounter including Diagnostic results, Prognosis, Risks and benefits of tx options, Instructions for management, Patient and family education, Importance of tx compliance, Risk factor reductions, Impressions, Counseling / Coordination of care, Documenting in the medical record, Reviewing / ordering tests, medicine, procedures  , and Obtaining or reviewing history  .

## 2024-08-07 NOTE — ASSESSMENT & PLAN NOTE
79-year-old female presented to the office with complaints of bilateral lower extremity edema and wounds that occurred 6 months ago.  Patient states the wounds at this time have fully healed.  She does have a small scratch on the anterior right lower leg which occurred recently but otherwise no other complaints.  Patient has been wearing compression stockings and reports significant improvement in her leg edema.  Does try to elevate legs when she is at home.  She is limited in her physical activity and uses a walker to ambulate.    I discussed with patient that likely she does have venous insufficiency based on physical exam.  There is some mild edema of the bilateral lower extremities as well as hyperpigmentation and some scaling.  We discussed that wound healing will be likely slow with her and our first recommendation is for conservative measures prior to pursuing any intervention.     I reviewed the patient's venous duplex that she had which shows no DVT bilaterally.  This unfortunately is not an insufficiency study so not able to evaluate for reflux.  If the patient does have recurrent wounds or nonhealing wound can consider at that time a insufficiency study and a discussion of potential intervention.    For now recommend she continue conservative measures as she has been doing.  Patient can follow-up with me as needed

## 2024-09-12 ENCOUNTER — HOSPITAL ENCOUNTER (OUTPATIENT)
Dept: MAMMOGRAPHY | Facility: CLINIC | Age: 79
End: 2024-09-12
Payer: MEDICARE

## 2024-09-12 VITALS — WEIGHT: 179 LBS | HEIGHT: 60 IN | BODY MASS INDEX: 35.14 KG/M2

## 2024-09-12 DIAGNOSIS — N63.0 LUMP IN FEMALE BREAST: ICD-10-CM

## 2024-09-12 PROCEDURE — 77066 DX MAMMO INCL CAD BI: CPT

## 2024-09-12 PROCEDURE — G0279 TOMOSYNTHESIS, MAMMO: HCPCS

## 2024-09-12 PROCEDURE — 76642 ULTRASOUND BREAST LIMITED: CPT

## 2024-09-17 ENCOUNTER — REMOTE DEVICE CLINIC VISIT (OUTPATIENT)
Dept: CARDIOLOGY CLINIC | Facility: CLINIC | Age: 79
End: 2024-09-17
Payer: MEDICARE

## 2024-09-17 DIAGNOSIS — I63.9 CRYPTOGENIC STROKE (HCC): Primary | ICD-10-CM

## 2024-09-17 PROCEDURE — 93298 REM INTERROG DEV EVAL SCRMS: CPT | Performed by: INTERNAL MEDICINE

## 2024-09-17 NOTE — PROGRESS NOTES
"MDT LNQ22 ICM - ACTIVE SYSTEM IS MRI CONDITIONAL   CARELINK TRANSMISSION: LOOP RECORDER. PRESENTING RHYTHM NSR @ 75 BPM. BATTERY STATUS \"OK.\" NO PATIENT OR DEVICE ACTIVATED EPISODES. NORMAL DEVICE FUNCTION. DL   "

## 2024-09-26 ENCOUNTER — OFFICE VISIT (OUTPATIENT)
Dept: NEPHROLOGY | Facility: CLINIC | Age: 79
End: 2024-09-26
Payer: MEDICARE

## 2024-09-26 VITALS
BODY MASS INDEX: 37.3 KG/M2 | SYSTOLIC BLOOD PRESSURE: 110 MMHG | DIASTOLIC BLOOD PRESSURE: 50 MMHG | WEIGHT: 190 LBS | HEIGHT: 60 IN

## 2024-09-26 DIAGNOSIS — D63.1 ANEMIA IN STAGE 3A CHRONIC KIDNEY DISEASE  (HCC): ICD-10-CM

## 2024-09-26 DIAGNOSIS — N18.31 STAGE 3A CHRONIC KIDNEY DISEASE (HCC): ICD-10-CM

## 2024-09-26 DIAGNOSIS — I50.32 CHRONIC DIASTOLIC CONGESTIVE HEART FAILURE (HCC): Primary | ICD-10-CM

## 2024-09-26 DIAGNOSIS — N18.31 ANEMIA IN STAGE 3A CHRONIC KIDNEY DISEASE  (HCC): ICD-10-CM

## 2024-09-26 PROBLEM — N17.9 AKI (ACUTE KIDNEY INJURY) (HCC): Status: RESOLVED | Noted: 2023-11-28 | Resolved: 2024-09-26

## 2024-09-26 PROBLEM — R60.1 GENERALIZED EDEMA: Status: ACTIVE | Noted: 2024-09-26

## 2024-09-26 PROCEDURE — 99214 OFFICE O/P EST MOD 30 MIN: CPT | Performed by: INTERNAL MEDICINE

## 2024-09-26 NOTE — ASSESSMENT & PLAN NOTE
Suspected stage III chronic kidney disease with fluctuant baseline creatinine which appears to be between 1 - 1.4.  Etiology most likely secondary to age-related changes, hypertension and a component due to cardiorenal syndrome and need for diuretic therapy.  Previous urinalysis shows bland urine sediment.

## 2024-09-26 NOTE — ASSESSMENT & PLAN NOTE
Previous echocardiogram just an ejection fraction of 70% with grade 1 diastolic dysfunction with mild dynamic LVOT obstruction  Given significant weight gain would recommend changing furosemide to torsemide 30 mg daily.  Maintain fluid restriction  With loop diuretic therapy change with hope for gradual weight loss back to a weight of approximately 180 lbs

## 2024-09-26 NOTE — PROGRESS NOTES
NEPHROLOGY OUTPATIENT PROGRESS NOTE   Mattie Varela 79 y.o. female MRN: [id number]  Reason for visit: Chronic kidney disease    Assessment & Plan  Stage 3a chronic kidney disease (HCC)  Suspected stage III chronic kidney disease with fluctuant baseline creatinine which appears to be between 1 - 1.4.  Etiology most likely secondary to age-related changes, hypertension and a component due to cardiorenal syndrome and need for diuretic therapy.  Previous urinalysis shows bland urine sediment.  Chronic diastolic congestive heart failure (HCC)  Previous echocardiogram just an ejection fraction of 70% with grade 1 diastolic dysfunction with mild dynamic LVOT obstruction  Given significant weight gain would recommend changing furosemide to torsemide 30 mg daily.  Maintain fluid restriction  With loop diuretic therapy change with hope for gradual weight loss back to a weight of approximately 180 lbs  Anemia in stage 3a chronic kidney disease  (HCC)  Patient reports anticipated IV iron infusions given worsening anemia.    Summary: Unfortunately do not have access to her most recent laboratory studies.  A call has been placed to [facility] for repeat discussion including with her primary care doctor.  Given her weight gain of 10 pounds with worsening edema extending into the upper thighs I would recommend a therapeutic change from Lasix to torsemide to 30 mg daily.  Hopefully will be a gradual weight loss to approximately 100 pounds.  However given her recurrent SADAF and fragile volume status would recommend checking laboratory studies at least monthly.    Depending on her laboratory studies and weight gains, tentative plan to see her in 6 months with repeat labs at that time however again further recommendations will be based upon her weight and repeat laboratory studies.    SUBJECTIVE / INTERVAL HISTORY:  She has been doing reasonably well although does complain of increasing lower extremity swelling now up into her  thighs.  Noted 10 pound weight gain since June.  Mild shortness of breath especially with exertion.  Denies any chest pain or pressure.  No reported abdominal pain.      OBJECTIVE:  /50 (BP Location: Right arm, Patient Position: Sitting, Cuff Size: Adult)   Ht 5' (1.524 m)   Wt 86.2 kg (190 lb)   LMP  (LMP Unknown)   BMI 37.11 kg/m²   Vitals:    09/26/24 1414   Weight: 86.2 kg (190 lb)       Physical Exam  Constitutional:       Appearance: She is not ill-appearing.   Eyes:      General: No scleral icterus.  Cardiovascular:      Rate and Rhythm: Normal rate and regular rhythm.   Pulmonary:      Effort: Pulmonary effort is normal.      Breath sounds: Normal breath sounds.   Abdominal:      General: There is no distension.      Palpations: Abdomen is soft.   Musculoskeletal:      Right lower leg: Edema present.      Left lower leg: Edema present.   Skin:     General: Skin is warm and dry.      Findings: No rash.   Neurological:      Mental Status: She is alert and oriented to person, place, and time.           Medications:    Current Outpatient Medications:     albuterol (PROVENTIL HFA,VENTOLIN HFA) 90 mcg/act inhaler, Inhale 2 puffs every 6 (six) hours as needed for wheezing or shortness of breath, Disp: 6.7 g, Rfl: 5    aspirin 81 mg chewable tablet, Chew 81 mg daily, Disp: , Rfl:     atorvastatin (LIPITOR) 40 mg tablet, TAKE ONE TABLET BY MOUTH ONCE DAILY (Patient taking differently: Take 40 mg by mouth daily), Disp: 90 tablet, Rfl: 0    baclofen 10 mg tablet, Take 10 mg by mouth 2 (two) times a day as needed for muscle spasms, Disp: , Rfl:     Blood Glucose Monitoring Suppl (OneTouch Verio Reflect) w/Device KIT, Check blood sugars twice daily. Please substitute with appropriate alternative as covered by patient's insurance. Dx: E11.65, Disp: 1 kit, Rfl: 0    clopidogrel (Plavix) 75 mg tablet, Take 1 tablet (75 mg total) by mouth daily, Disp: 90 tablet, Rfl: 0    docusate sodium (COLACE) 100 mg capsule,  Take 1 capsule (100 mg total) by mouth 2 (two) times a day (Patient taking differently: Take 100 mg by mouth 2 (two) times a day as needed), Disp: , Rfl:     ferrous sulfate 325 (65 Fe) mg tablet, Take 1 tablet (325 mg total) by mouth daily with breakfast, Disp: 90 tablet, Rfl: 0    furosemide (LASIX) 20 mg tablet, Take 1 tablet (20 mg total) by mouth daily (Patient taking differently: Take 40 mg by mouth daily), Disp: , Rfl:     glucose blood (OneTouch Verio) test strip, Check blood sugars twice daily. Please substitute with appropriate alternative as covered by patient's insurance. Dx: E11.65, Disp: 200 each, Rfl: 3    latanoprost (XALATAN) 0.005 % ophthalmic solution, Administer 1 drop to both eyes daily at bedtime, Disp: , Rfl:     levothyroxine 75 mcg tablet, Take 0.5 tablets (37.5 mcg total) by mouth daily in the early morning, Disp: , Rfl:     MAGNESIUM OXIDE 400 PO, Take 400 mg by mouth 2 (two) times a day, Disp: , Rfl:     meclizine (ANTIVERT) 25 mg tablet, Take 1 tablet (25 mg total) by mouth 4 (four) times a day as needed for dizziness (Patient taking differently: Take 25 mg by mouth daily as needed for dizziness), Disp: 60 tablet, Rfl: 0    metFORMIN (GLUCOPHAGE) 1000 MG tablet, Take 1 tablet (1,000 mg total) by mouth 2 (two) times a day with meals, Disp: 180 tablet, Rfl: 3    metoprolol succinate (TOPROL-XL) 50 mg 24 hr tablet, Take 1 tablet (50 mg total) by mouth every 12 (twelve) hours, Disp: 60 tablet, Rfl: 0    Milnacipran HCl (Savella) 100 MG TABS, Take 1 tablet (100 mg total) by mouth daily, Disp: 30 tablet, Rfl: 3    OneTouch Delica Lancets 33G MISC, Check blood sugars twice daily. Please substitute with appropriate alternative as covered by patient's insurance. Dx: E11.65, Disp: 200 each, Rfl: 3    oxyCODONE-acetaminophen (PERCOCET)  mg per tablet, Take 1 tablet by mouth every 6 (six) hours as needed, Disp: , Rfl:     pantoprazole (PROTONIX) 40 mg tablet, Take 1 tablet (40 mg total) by  mouth daily, Disp: , Rfl:     trospium (SANCTURA XR) 60 mg 24 hr capsule, Take 1 capsule (60 mg total) by mouth daily before breakfast, Disp: 30 capsule, Rfl: 3    polyethylene glycol (MIRALAX) 17 g packet, Take 17 g by mouth daily (Patient not taking: Reported on 7/24/2024), Disp: , Rfl:     senna (SENOKOT) 8.6 mg, Take 2 tablets (17.2 mg total) by mouth daily at bedtime (Patient not taking: Reported on 7/24/2024), Disp: , Rfl:     Laboratory Results:  Results for orders placed or performed in visit on 06/18/24   POCT Measure PVR    Collection Time: 06/18/24  2:44 PM   Result Value Ref Range    POST-VOID RESIDUAL VOLUME, ML  mL             '

## 2024-10-01 ENCOUNTER — TELEPHONE (OUTPATIENT)
Dept: LAB | Facility: HOSPITAL | Age: 79
End: 2024-10-01

## 2024-10-01 PROBLEM — N36.44 DETRUSOR SPHINCTER DYSSYNERGIA: Status: ACTIVE | Noted: 2024-10-01

## 2024-10-01 NOTE — TELEPHONE ENCOUNTER
Sharla from [employer] is initiating another call back to patient, Script was faxed to mobile.

## 2024-10-01 NOTE — PROGRESS NOTES
Cystoscopy     Date/Time  10/2/2024 9:00 AM     Performed by  Benjamin Ramirez MD   Authorized by  Benjamin Ramirez MD         Procedure Details:  Procedure type: cystoscopy, injection for chemodenervation       Office Cystoscopy Procedure Note    Indication:    Detrusor overactivity with incontinence    Informed consent   The risks, benefits, complications, treatment options, and expected outcomes were discussed with the patient. The patient concurred with the proposed plan and provided informed consent.    Anesthesia  Lidocaine 2% was instilled through a Bateman catheter and allowed to indwell for 10 minutes prior to starting the procedure.      Procedure  In the presence of a female nurse, the patient was placed in the supine frog-legged position, was prepped and draped in the usual manner using sterile technique, and 2% lidocaine jelly instilled into the urethra.  A 17 F flexible cystoscope was then inserted into the urethra and the urethra and bladder carefully examined.  The following findings were noted:    Findings:  Urethra:  Normal  Bladder:  Normal  Ureteral orifices:  Normal  Other findings:  None     200 Units of bladder Botox was diluted injectable saline according to manufacture instructions and administered in a standard supratrigonal template    Following the procedure, the bladder was insufflated to 200 cc and the patient underwent an active voiding trial prior to discharge from the office.    Specimens: None                 Complications:    None; patient tolerated the procedure well           Disposition: To home after 30 minute observation.           Condition: Stable    Plan:     Will prescheduled the patient for repeat injection in 4 months, we will again utilize 200 units

## 2024-10-01 NOTE — TELEPHONE ENCOUNTER
Patient gave a phone number to call and schedule.  Persons name is  [phone]  left voicemail on 2:34pm

## 2024-10-01 NOTE — TELEPHONE ENCOUNTER
10/1 - pt did not know why we were calling - she said to reach out to [facility] - will reach out to them

## 2024-10-02 ENCOUNTER — PROCEDURE VISIT (OUTPATIENT)
Dept: UROLOGY | Facility: CLINIC | Age: 79
End: 2024-10-02
Payer: MEDICARE

## 2024-10-02 VITALS
OXYGEN SATURATION: 97 % | SYSTOLIC BLOOD PRESSURE: 102 MMHG | WEIGHT: 188.6 LBS | DIASTOLIC BLOOD PRESSURE: 68 MMHG | BODY MASS INDEX: 37.03 KG/M2 | HEART RATE: 84 BPM | HEIGHT: 60 IN

## 2024-10-02 DIAGNOSIS — N36.44 DETRUSOR SPHINCTER DYSSYNERGIA: Primary | ICD-10-CM

## 2024-10-02 DIAGNOSIS — R39.15 URGENCY OF URINATION: ICD-10-CM

## 2024-10-02 DIAGNOSIS — N39.46 MIXED INCONTINENCE: ICD-10-CM

## 2024-10-02 DIAGNOSIS — R32 URINARY INCONTINENCE, UNSPECIFIED TYPE: ICD-10-CM

## 2024-10-02 PROCEDURE — 52287 CYSTOSCOPY CHEMODENERVATION: CPT | Performed by: UROLOGY

## 2024-10-03 ENCOUNTER — TELEPHONE (OUTPATIENT)
Dept: LAB | Facility: HOSPITAL | Age: 79
End: 2024-10-03

## 2024-10-07 ENCOUNTER — NURSE TRIAGE (OUTPATIENT)
Age: 79
End: 2024-10-07

## 2024-10-07 DIAGNOSIS — R39.9 UTI SYMPTOMS: Primary | ICD-10-CM

## 2024-10-07 RX ORDER — CIPROFLOXACIN 500 MG/1
500 TABLET, FILM COATED ORAL EVERY 12 HOURS SCHEDULED
Qty: 14 TABLET | Refills: 0 | Status: SHIPPED | OUTPATIENT
Start: 2024-10-07 | End: 2024-10-19

## 2024-10-07 NOTE — TELEPHONE ENCOUNTER
[facility] called to schedule patient for 4 month injection with Dr. Ramirez in February but I could not find anything until April of next year.     CB: [phone]      [Follow-Up - Clinic] : a clinic follow-up of [Dyspnea] : dyspnea

## 2024-10-07 NOTE — TELEPHONE ENCOUNTER
Ideally would have urine culture obtained prior to starting antibiotics. Due to symptoms, I sent over cipro. Multiple drug allergies, limited medication options. Please monitor for final results to ensure cipro is susceptible

## 2024-10-07 NOTE — TELEPHONE ENCOUNTER
Patient of Dr. Ramirez, last seen 10/2/24    2 days after Cystoscopy patient states she began with UTI symptoms. Patient is requesting to start on antibiotics    Patient states her daughter who is a MD brought her a home urine test. Patient states it was very high  and abnormal but unsure of exact abnormalities.  She lives in assistant living and she does not drive. [employer] [facility] is a service patient has that  may come and collect her urine sample. She will call and request and also get there fax number so we can fax the orders for her.    Encouraged to increase water to 64 ounces daily and decrease bladder irritants, call back if new or worsening symptoms    [facility] pharmacy on file     UA and UCX orders placed  Answer Assessment - Initial Assessment Questions  1. When did your urinary frequency begin? Can you describe if you are voiding normal or small amounts of urine? Do you have any other urinary symptoms such as incontinence, nocturia (urinating frequently at night), weak urine stream or dysuria (discomfort, pain such as burning, itching, or stinging?)  10/4/24 small output, burning, nocturia, increased  incontinence  2. Have you seen any blood in your urine?   denies  3. Do you have a fever of 101 or higher?   denies  4. Have you taken any cold, allergy medications or taking a diuretic?  Lasix  5. Have you had a recent urologic surgery or procedure?  10/2/24 Cystoscopy  6 .Are you diabetic?  yes  8. Do you have a history of recurrent UTI (urinary tract infections) with self-start therapy? If yes, to start antibiotics after submitting your urine for testing.  yes    Protocols used: Urology-Urinary Frequency-ADULT-[place]

## 2024-10-11 ENCOUNTER — TELEPHONE (OUTPATIENT)
Dept: UROLOGY | Facility: CLINIC | Age: 79
End: 2024-10-11

## 2024-10-11 ENCOUNTER — APPOINTMENT (EMERGENCY)
Dept: CT IMAGING | Facility: HOSPITAL | Age: 79
DRG: 871 | End: 2024-10-11
Payer: MEDICARE

## 2024-10-11 ENCOUNTER — APPOINTMENT (EMERGENCY)
Dept: RADIOLOGY | Facility: HOSPITAL | Age: 79
DRG: 871 | End: 2024-10-11
Payer: MEDICARE

## 2024-10-11 ENCOUNTER — HOSPITAL ENCOUNTER (INPATIENT)
Facility: HOSPITAL | Age: 79
LOS: 8 days | Discharge: NURSING, REHAB OR LONG-TERM CARE | DRG: 871 | End: 2024-10-19
Attending: EMERGENCY MEDICINE | Admitting: INTERNAL MEDICINE
Payer: MEDICARE

## 2024-10-11 DIAGNOSIS — I50.9 CHF EXACERBATION (HCC): ICD-10-CM

## 2024-10-11 DIAGNOSIS — N17.9 AKI (ACUTE KIDNEY INJURY) (HCC): ICD-10-CM

## 2024-10-11 DIAGNOSIS — K21.9 GERD (GASTROESOPHAGEAL REFLUX DISEASE): ICD-10-CM

## 2024-10-11 DIAGNOSIS — R07.9 CHEST PAIN: Primary | ICD-10-CM

## 2024-10-11 DIAGNOSIS — G47.00 INSOMNIA: ICD-10-CM

## 2024-10-11 DIAGNOSIS — E13.9 DIABETES 1.5, MANAGED AS TYPE 2 (HCC): ICD-10-CM

## 2024-10-11 DIAGNOSIS — R33.9 URINARY RETENTION: ICD-10-CM

## 2024-10-11 DIAGNOSIS — M48.061 DEGENERATIVE LUMBAR SPINAL STENOSIS: ICD-10-CM

## 2024-10-11 DIAGNOSIS — N30.00 ACUTE CYSTITIS WITHOUT HEMATURIA: ICD-10-CM

## 2024-10-11 LAB
2HR DELTA HS TROPONIN: -2 NG/L
ALBUMIN SERPL BCG-MCNC: 3.8 G/DL (ref 3.5–5)
ALP SERPL-CCNC: 123 U/L (ref 34–104)
ALT SERPL W P-5'-P-CCNC: 7 U/L (ref 7–52)
ANION GAP SERPL CALCULATED.3IONS-SCNC: 7 MMOL/L (ref 4–13)
AST SERPL W P-5'-P-CCNC: 17 U/L (ref 13–39)
ATRIAL RATE: 68 BPM
ATRIAL RATE: 79 BPM
BASOPHILS # BLD AUTO: 0.07 THOUSANDS/ΜL (ref 0–0.1)
BASOPHILS NFR BLD AUTO: 1 % (ref 0–1)
BILIRUB SERPL-MCNC: 0.34 MG/DL (ref 0.2–1)
BILIRUB UR QL STRIP: NEGATIVE
BNP SERPL-MCNC: 55 PG/ML (ref 0–100)
BUN SERPL-MCNC: 29 MG/DL (ref 5–25)
CALCIUM SERPL-MCNC: 9.4 MG/DL (ref 8.4–10.2)
CARDIAC TROPONIN I PNL SERPL HS: 10 NG/L
CARDIAC TROPONIN I PNL SERPL HS: 8 NG/L
CHLORIDE SERPL-SCNC: 99 MMOL/L (ref 96–108)
CLARITY UR: CLEAR
CO2 SERPL-SCNC: 30 MMOL/L (ref 21–32)
COLOR UR: NORMAL
CREAT SERPL-MCNC: 1.65 MG/DL (ref 0.6–1.3)
EOSINOPHIL # BLD AUTO: 1.11 THOUSAND/ΜL (ref 0–0.61)
EOSINOPHIL NFR BLD AUTO: 14 % (ref 0–6)
ERYTHROCYTE [DISTWIDTH] IN BLOOD BY AUTOMATED COUNT: 13.2 % (ref 11.6–15.1)
GFR SERPL CREATININE-BSD FRML MDRD: 28 ML/MIN/1.73SQ M
GLUCOSE SERPL-MCNC: 124 MG/DL (ref 65–140)
GLUCOSE SERPL-MCNC: 128 MG/DL (ref 65–140)
GLUCOSE UR STRIP-MCNC: NEGATIVE MG/DL
HCT VFR BLD AUTO: 34.5 % (ref 34.8–46.1)
HGB BLD-MCNC: 10.8 G/DL (ref 11.5–15.4)
HGB UR QL STRIP.AUTO: NEGATIVE
IMM GRANULOCYTES # BLD AUTO: 0.01 THOUSAND/UL (ref 0–0.2)
IMM GRANULOCYTES NFR BLD AUTO: 0 % (ref 0–2)
KETONES UR STRIP-MCNC: NEGATIVE MG/DL
LEUKOCYTE ESTERASE UR QL STRIP: NEGATIVE
LIPASE SERPL-CCNC: 42 U/L (ref 11–82)
LYMPHOCYTES # BLD AUTO: 1.91 THOUSANDS/ΜL (ref 0.6–4.47)
LYMPHOCYTES NFR BLD AUTO: 25 % (ref 14–44)
MCH RBC QN AUTO: 28.4 PG (ref 26.8–34.3)
MCHC RBC AUTO-ENTMCNC: 31.3 G/DL (ref 31.4–37.4)
MCV RBC AUTO: 91 FL (ref 82–98)
MONOCYTES # BLD AUTO: 0.69 THOUSAND/ΜL (ref 0.17–1.22)
MONOCYTES NFR BLD AUTO: 9 % (ref 4–12)
NEUTROPHILS # BLD AUTO: 3.97 THOUSANDS/ΜL (ref 1.85–7.62)
NEUTS SEG NFR BLD AUTO: 51 % (ref 43–75)
NITRITE UR QL STRIP: NEGATIVE
NRBC BLD AUTO-RTO: 0 /100 WBCS
P AXIS: 49 DEGREES
P AXIS: 50 DEGREES
PH UR STRIP.AUTO: 6 [PH]
PLATELET # BLD AUTO: 264 THOUSANDS/UL (ref 149–390)
PMV BLD AUTO: 10.5 FL (ref 8.9–12.7)
POTASSIUM SERPL-SCNC: 5 MMOL/L (ref 3.5–5.3)
PR INTERVAL: 150 MS
PR INTERVAL: 156 MS
PROT SERPL-MCNC: 6.7 G/DL (ref 6.4–8.4)
PROT UR STRIP-MCNC: NEGATIVE MG/DL
QRS AXIS: -33 DEGREES
QRS AXIS: -43 DEGREES
QRSD INTERVAL: 130 MS
QRSD INTERVAL: 134 MS
QT INTERVAL: 456 MS
QT INTERVAL: 496 MS
QTC INTERVAL: 522 MS
QTC INTERVAL: 527 MS
RBC # BLD AUTO: 3.8 MILLION/UL (ref 3.81–5.12)
SODIUM SERPL-SCNC: 136 MMOL/L (ref 135–147)
SP GR UR STRIP.AUTO: 1.01 (ref 1–1.03)
T WAVE AXIS: 90 DEGREES
T WAVE AXIS: 92 DEGREES
UROBILINOGEN UR STRIP-ACNC: <2 MG/DL
VENTRICULAR RATE: 68 BPM
VENTRICULAR RATE: 79 BPM
WBC # BLD AUTO: 7.76 THOUSAND/UL (ref 4.31–10.16)

## 2024-10-11 PROCEDURE — 71045 X-RAY EXAM CHEST 1 VIEW: CPT

## 2024-10-11 PROCEDURE — 74176 CT ABD & PELVIS W/O CONTRAST: CPT

## 2024-10-11 PROCEDURE — 99285 EMERGENCY DEPT VISIT HI MDM: CPT

## 2024-10-11 PROCEDURE — 71250 CT THORAX DX C-: CPT

## 2024-10-11 PROCEDURE — 83690 ASSAY OF LIPASE: CPT

## 2024-10-11 PROCEDURE — 83880 ASSAY OF NATRIURETIC PEPTIDE: CPT

## 2024-10-11 PROCEDURE — 85025 COMPLETE CBC W/AUTO DIFF WBC: CPT | Performed by: EMERGENCY MEDICINE

## 2024-10-11 PROCEDURE — 93010 ELECTROCARDIOGRAM REPORT: CPT | Performed by: STUDENT IN AN ORGANIZED HEALTH CARE EDUCATION/TRAINING PROGRAM

## 2024-10-11 PROCEDURE — 84484 ASSAY OF TROPONIN QUANT: CPT | Performed by: EMERGENCY MEDICINE

## 2024-10-11 PROCEDURE — 81003 URINALYSIS AUTO W/O SCOPE: CPT

## 2024-10-11 PROCEDURE — 90662 IIV NO PRSV INCREASED AG IM: CPT | Performed by: INTERNAL MEDICINE

## 2024-10-11 PROCEDURE — 96361 HYDRATE IV INFUSION ADD-ON: CPT

## 2024-10-11 PROCEDURE — 82948 REAGENT STRIP/BLOOD GLUCOSE: CPT

## 2024-10-11 PROCEDURE — 93005 ELECTROCARDIOGRAM TRACING: CPT

## 2024-10-11 PROCEDURE — 36415 COLL VENOUS BLD VENIPUNCTURE: CPT

## 2024-10-11 PROCEDURE — 80053 COMPREHEN METABOLIC PANEL: CPT | Performed by: EMERGENCY MEDICINE

## 2024-10-11 PROCEDURE — 99223 1ST HOSP IP/OBS HIGH 75: CPT | Performed by: INTERNAL MEDICINE

## 2024-10-11 PROCEDURE — 96360 HYDRATION IV INFUSION INIT: CPT

## 2024-10-11 PROCEDURE — G0008 ADMIN INFLUENZA VIRUS VAC: HCPCS | Performed by: INTERNAL MEDICINE

## 2024-10-11 RX ORDER — HEPARIN SODIUM 5000 [USP'U]/ML
5000 INJECTION, SOLUTION INTRAVENOUS; SUBCUTANEOUS EVERY 8 HOURS SCHEDULED
Status: DISCONTINUED | OUTPATIENT
Start: 2024-10-11 | End: 2024-10-19 | Stop reason: HOSPADM

## 2024-10-11 RX ORDER — INSULIN LISPRO 100 [IU]/ML
1-5 INJECTION, SOLUTION INTRAVENOUS; SUBCUTANEOUS
Status: DISCONTINUED | OUTPATIENT
Start: 2024-10-11 | End: 2024-10-19 | Stop reason: HOSPADM

## 2024-10-11 RX ORDER — CIPROFLOXACIN 500 MG/1
500 TABLET, FILM COATED ORAL EVERY 24 HOURS
Status: COMPLETED | OUTPATIENT
Start: 2024-10-12 | End: 2024-10-16

## 2024-10-11 RX ORDER — PANTOPRAZOLE SODIUM 40 MG/1
40 TABLET, DELAYED RELEASE ORAL
Status: DISCONTINUED | OUTPATIENT
Start: 2024-10-12 | End: 2024-10-19 | Stop reason: HOSPADM

## 2024-10-11 RX ORDER — MECLIZINE HYDROCHLORIDE 25 MG/1
25 TABLET ORAL DAILY PRN
Status: DISCONTINUED | OUTPATIENT
Start: 2024-10-11 | End: 2024-10-19 | Stop reason: HOSPADM

## 2024-10-11 RX ORDER — ASPIRIN 81 MG/1
81 TABLET, CHEWABLE ORAL DAILY
Status: DISCONTINUED | OUTPATIENT
Start: 2024-10-12 | End: 2024-10-19 | Stop reason: HOSPADM

## 2024-10-11 RX ORDER — POLYETHYLENE GLYCOL 3350 17 G/17G
17 POWDER, FOR SOLUTION ORAL DAILY
Status: DISCONTINUED | OUTPATIENT
Start: 2024-10-12 | End: 2024-10-19 | Stop reason: HOSPADM

## 2024-10-11 RX ORDER — ATORVASTATIN CALCIUM 40 MG/1
40 TABLET, FILM COATED ORAL DAILY
Status: DISCONTINUED | OUTPATIENT
Start: 2024-10-12 | End: 2024-10-19 | Stop reason: HOSPADM

## 2024-10-11 RX ORDER — FERROUS SULFATE 325(65) MG
325 TABLET ORAL
Status: DISCONTINUED | OUTPATIENT
Start: 2024-10-12 | End: 2024-10-19 | Stop reason: HOSPADM

## 2024-10-11 RX ORDER — DOCUSATE SODIUM 100 MG/1
100 CAPSULE, LIQUID FILLED ORAL 2 TIMES DAILY PRN
Status: DISCONTINUED | OUTPATIENT
Start: 2024-10-11 | End: 2024-10-13

## 2024-10-11 RX ORDER — ACETAMINOPHEN 325 MG/1
650 TABLET ORAL EVERY 6 HOURS PRN
Status: DISCONTINUED | OUTPATIENT
Start: 2024-10-11 | End: 2024-10-14

## 2024-10-11 RX ORDER — FUROSEMIDE 10 MG/ML
60 INJECTION INTRAMUSCULAR; INTRAVENOUS 2 TIMES DAILY
Status: DISCONTINUED | OUTPATIENT
Start: 2024-10-11 | End: 2024-10-14

## 2024-10-11 RX ORDER — ALBUTEROL SULFATE 90 UG/1
2 INHALANT RESPIRATORY (INHALATION) EVERY 6 HOURS PRN
Status: DISCONTINUED | OUTPATIENT
Start: 2024-10-11 | End: 2024-10-19 | Stop reason: HOSPADM

## 2024-10-11 RX ORDER — MAGNESIUM HYDROXIDE/ALUMINUM HYDROXICE/SIMETHICONE 120; 1200; 1200 MG/30ML; MG/30ML; MG/30ML
30 SUSPENSION ORAL EVERY 6 HOURS PRN
Status: DISCONTINUED | OUTPATIENT
Start: 2024-10-11 | End: 2024-10-19 | Stop reason: HOSPADM

## 2024-10-11 RX ORDER — LEVOTHYROXINE SODIUM 75 UG/1
37.5 TABLET ORAL
Status: DISCONTINUED | OUTPATIENT
Start: 2024-10-12 | End: 2024-10-19 | Stop reason: HOSPADM

## 2024-10-11 RX ORDER — SENNOSIDES 8.6 MG
17.2 TABLET ORAL
Status: DISCONTINUED | OUTPATIENT
Start: 2024-10-11 | End: 2024-10-13

## 2024-10-11 RX ORDER — METOPROLOL SUCCINATE 50 MG/1
50 TABLET, EXTENDED RELEASE ORAL EVERY 12 HOURS SCHEDULED
Status: DISCONTINUED | OUTPATIENT
Start: 2024-10-11 | End: 2024-10-19 | Stop reason: HOSPADM

## 2024-10-11 RX ORDER — LANOLIN ALCOHOL/MO/W.PET/CERES
400 CREAM (GRAM) TOPICAL 2 TIMES DAILY
Status: DISCONTINUED | OUTPATIENT
Start: 2024-10-11 | End: 2024-10-19 | Stop reason: HOSPADM

## 2024-10-11 RX ORDER — FERROUS SULFATE 325(65) MG
325 TABLET ORAL
Status: DISCONTINUED | OUTPATIENT
Start: 2024-10-12 | End: 2024-10-11

## 2024-10-11 RX ORDER — TORSEMIDE 20 MG/1
20 TABLET ORAL DAILY
Status: ON HOLD | COMMUNITY

## 2024-10-11 RX ORDER — ONDANSETRON 2 MG/ML
4 INJECTION INTRAMUSCULAR; INTRAVENOUS EVERY 6 HOURS PRN
Status: DISCONTINUED | OUTPATIENT
Start: 2024-10-11 | End: 2024-10-19 | Stop reason: HOSPADM

## 2024-10-11 RX ORDER — LATANOPROST 50 UG/ML
1 SOLUTION/ DROPS OPHTHALMIC
Status: DISCONTINUED | OUTPATIENT
Start: 2024-10-11 | End: 2024-10-19 | Stop reason: HOSPADM

## 2024-10-11 RX ORDER — OXYCODONE AND ACETAMINOPHEN 5; 325 MG/1; MG/1
2 TABLET ORAL ONCE
Status: COMPLETED | OUTPATIENT
Start: 2024-10-11 | End: 2024-10-11

## 2024-10-11 RX ORDER — CIPROFLOXACIN 250 MG/1
250 TABLET, FILM COATED ORAL EVERY 12 HOURS SCHEDULED
Status: DISCONTINUED | OUTPATIENT
Start: 2024-10-11 | End: 2024-10-11

## 2024-10-11 RX ORDER — BACLOFEN 10 MG/1
10 TABLET ORAL 2 TIMES DAILY PRN
Status: DISCONTINUED | OUTPATIENT
Start: 2024-10-11 | End: 2024-10-19 | Stop reason: HOSPADM

## 2024-10-11 RX ORDER — OXYCODONE HYDROCHLORIDE 10 MG/1
10 TABLET ORAL 3 TIMES DAILY PRN
Status: DISCONTINUED | OUTPATIENT
Start: 2024-10-11 | End: 2024-10-13

## 2024-10-11 RX ADMIN — INFLUENZA A VIRUS A/VICTORIA/4897/2022 IVR-238 (H1N1) ANTIGEN (FORMALDEHYDE INACTIVATED), INFLUENZA A VIRUS A/CALIFORNIA/122/2022 SAN-022 (H3N2) ANTIGEN (FORMALDEHYDE INACTIVATED), AND INFLUENZA B VIRUS B/MICHIGAN/01/2021 ANTIGEN (FORMALDEHYDE INACTIVATED) 0.5 ML: 60; 60; 60 INJECTION, SUSPENSION INTRAMUSCULAR at 18:00

## 2024-10-11 RX ADMIN — HEPARIN SODIUM 5000 UNITS: 5000 INJECTION INTRAVENOUS; SUBCUTANEOUS at 22:03

## 2024-10-11 RX ADMIN — Medication 400 MG: at 22:03

## 2024-10-11 RX ADMIN — OXYCODONE HYDROCHLORIDE AND ACETAMINOPHEN 2 TABLET: 5; 325 TABLET ORAL at 14:48

## 2024-10-11 RX ADMIN — BACLOFEN 10 MG: 10 TABLET ORAL at 22:12

## 2024-10-11 RX ADMIN — OXYCODONE HYDROCHLORIDE 10 MG: 10 TABLET ORAL at 22:07

## 2024-10-11 RX ADMIN — FUROSEMIDE 60 MG: 10 INJECTION, SOLUTION INTRAVENOUS at 19:54

## 2024-10-11 RX ADMIN — LATANOPROST 1 DROP: 50 SOLUTION OPHTHALMIC at 22:10

## 2024-10-11 RX ADMIN — METOPROLOL SUCCINATE 50 MG: 50 TABLET, EXTENDED RELEASE ORAL at 22:03

## 2024-10-11 RX ADMIN — SODIUM CHLORIDE 500 ML: 0.9 INJECTION, SOLUTION INTRAVENOUS at 14:51

## 2024-10-11 RX ADMIN — SENNOSIDES 17.2 MG: 8.6 TABLET, FILM COATED ORAL at 22:03

## 2024-10-11 NOTE — ASSESSMENT & PLAN NOTE
Suspect cardiorenal in setting of heart failure  Monitor BMP with diuretics  Consider nephrology evaluation

## 2024-10-11 NOTE — ASSESSMENT & PLAN NOTE
Patient was diagnosed with acute cystitis as an outpatient was started on ciprofloxacin 7-day course through 10/15/2024  Continue renal adjusted dose through 10/15/2024

## 2024-10-11 NOTE — ASSESSMENT & PLAN NOTE
Status post spinal stimulator utilizes baclofen as needed and Percocet 10 mg 3 times daily as needed

## 2024-10-11 NOTE — ASSESSMENT & PLAN NOTE
"Lab Results   Component Value Date    HGBA1C 6.5 (H) 11/26/2023       No results for input(s): \"POCGLU\" in the last 72 hours.    Blood Sugar Average: Last 72 hrs:    Carb controlled diet  Sliding scale insulin  "

## 2024-10-11 NOTE — ASSESSMENT & PLAN NOTE
EKG normal sinus rhythm without ischemic changes  Troponin unremarkable  Suspect related to heart failure, component of anxiety  Monitor telemetry

## 2024-10-11 NOTE — ED PROVIDER NOTES
Time reflects when diagnosis was documented in both MDM as applicable and the Disposition within this note       Time User Action Codes Description Comment    10/11/2024  4:30 PM Lamonte Minor [R07.9] Chest pain     10/11/2024  4:31 PM Lamonte Minor [I50.9] CHF exacerbation (HCC)     10/11/2024  4:31 PM Lamonte Minor [N17.9] SADAF (acute kidney injury) (HCC)           ED Disposition       ED Disposition   Admit    Condition   Stable    Date/Time   Fri Oct 11, 2024  4:45 PM    Comment   Case was discussed with Dr Angel and the patient's admission status was agreed to be Admission Status: inpatient status to the service of Dr. Angel.               Assessment & Plan       Medical Decision Making  79-year-old female presents to the ED for evaluation of chest pain, also complains of bilateral flank pain, increased urinary frequency as well as unintentional 20 pound weight gain over the last few weeks with increasing leg swelling.  Patient afebrile in ED with normal vital signs.  Initial and repeat troponin negative, EKG without acute ischemic changes.  Labs were concerning for SADAF with elevated creatinine.  Case was discussed with Isaias who was agreeable to admission.  See ED course for additional details.    Amount and/or Complexity of Data Reviewed  Labs: ordered. Decision-making details documented in ED Course.  Radiology: ordered. Decision-making details documented in ED Course.    Risk  Prescription drug management.  Decision regarding hospitalization.        ED Course as of 10/11/24 1859   Fri Oct 11, 2024   1340 Hemoglobin(!): 10.8   1340 Hemoglobin(!): 10.8  History of anemia, stable from prior labs.   1539 CT chest abdomen pelvis wo contrast  IMPRESSION:     No evidence of consolidation or pleural effusion.     No evidence of hydronephrosis or stone.     Bladder distention with air-fluid level should be correlated with urinalysis and any recent instrumentation. Correlation with any voiding  dysfunction is advised. Bateman catheter may be useful.     No small bowel dilatation.     The study is limited due to extensive streak artifacts from orthopedic hardware.     1539 UA w Reflex to Microscopic w Reflex to Culture   1540 CT chest abdomen pelvis wo contrast  Discussed case with NANY Mccabe with urology.  Per urology, given recent cystoscopy on 10/02 it is not abnormal to have some gas and distention still in the bladder.  Patient had approximately 450 mL urine on straight cath.  Urinalysis showing no signs of UTI.   1547 Creatinine(!): 1.65  Creatinine 1.094 months prior.  Concerning for SADAF.   1640 SLIM paged.   2086 Discussed case with Slpetra advised admission MedSurg telemetry.       Medications   sodium chloride 0.9 % bolus 500 mL (0 mL Intravenous Stopped 10/11/24 1658)   oxyCODONE-acetaminophen (PERCOCET) 5-325 mg per tablet 2 tablet (2 tablets Oral Given 10/11/24 1448)       ED Risk Strat Scores   HEART Risk Score      Flowsheet Row Most Recent Value   Heart Score Risk Calculator    History 0 Filed at: 10/11/2024 1543   ECG 1 Filed at: 10/11/2024 1543   Age 2 Filed at: 10/11/2024 1543   Risk Factors 2 Filed at: 10/11/2024 1543   Troponin 0 Filed at: 10/11/2024 1543   HEART Score 5 Filed at: 10/11/2024 1543                               SBIRT 20yo+      Flowsheet Row Most Recent Value   Initial Alcohol Screen: US AUDIT-C     1. How often do you have a drink containing alcohol? 0 Filed at: 10/11/2024 1223   2. How many drinks containing alcohol do you have on a typical day you are drinking?  0 Filed at: 10/11/2024 1223   3a. Male UNDER 65: How often do you have five or more drinks on one occasion? 0 Filed at: 10/11/2024 1223   3b. FEMALE Any Age, or MALE 65+: How often do you have 4 or more drinks on one occassion? 0 Filed at: 10/11/2024 1223   Audit-C Score 0 Filed at: 10/11/2024 1223   DUNCAN: How many times in the past year have you...    Used an illegal drug or used a prescription medication for  non-medical reasons? Never Filed at: 10/11/2024 1223          LETY Risk Score      Flowsheet Row Most Recent Value   Age >= 65 1 Filed at: 10/11/2024 1858   Known CAD (stenosis >= 50%) 1 Filed at: 10/11/2024 1858   Recent (<=24 hrs) Service Angina 1 Filed at: 10/11/2024 1858   ST Deviation >= 0.5 mm 0 Filed at: 10/11/2024 1858   3+ CAD Risk Factors (FHx, HTN, HLP, DM, Smoker) 1 Filed at: 10/11/2024 1858   Aspirin Use Past 7 Days 1 Filed at: 10/11/2024 1858   Elevated Cardiac Markers 0 Filed at: 10/11/2024 1858   LETY Risk Score (Calculated) 5 Filed at: 10/11/2024 1858                          History of Present Illness       Chief Complaint   Patient presents with    Chest Pain     Pt reports Mid sternal and Radiates to right side that began last night. Pt reports weakness. Pt states she's being treated for kidney infection and bilateral flank pain.        Past Medical History:   Diagnosis Date    Acid reflux     Acute kidney injury (HCC)     Anemia     hx of iron-deficient    Anxiety     Arthritis     Asthma     last needed inhaler last year 2020    Basal cell carcinoma     upper lip    Chronic narcotic dependence (HCC)     Chronic pain     Colon polyp     Cystocele     Diabetes mellitus (HCC)     stable    Disease of thyroid gland     hypothyroidism    Diverticulosis     Dizziness     at times    Dysfunctional uterine bleeding     last assessed - [id number]    Dysphagia     Fibromyalgia     Gastric ulcer     Gastroparesis     History of colonic polyps     last assessed - [id number]    History of gastroesophageal reflux (GERD)     Hypercholesterolemia     Hyperlipidemia     Hypertension     Hyponatremia     IBS (irritable bowel syndrome)     Pneumobilia 06/18/2022    Post laminectomy syndrome     S/P insertion of spinal cord stimulator 07/18/2018    s/p [employer] loop recorder 3/2/2024 03/02/2024    Seasonal allergies     Shortness of breath     exertional    Spinal stenosis     Status post lumbar spinal fusion  03/16/2018    Stroke (HCC)     pt states slight stroke March 2022      Past Surgical History:   Procedure Laterality Date    APPENDECTOMY      BACK SURGERY      BREAST CYST EXCISION Left     CARDIAC CATHETERIZATION  11/14/2023    Procedure: Cardiac catheterization;  Surgeon: Randolph Holt MD;  [facility];  Service: Cardiology    CARDIAC CATHETERIZATION N/A 11/14/2023    Procedure: Cardiac Coronary Angiogram;  Surgeon: Randolph Holt MD;  [facility];  Service: Cardiology    CARDIAC ELECTROPHYSIOLOGY PROCEDURE N/A 3/2/2024    Procedure: Cardiac loop recorder implant;  Surgeon: Edu Fontaine MD;  [facility];  Service: Cardiology    CHOLECYSTECTOMY      COLONOSCOPY      ESOPHAGOGASTRODUODENOSCOPY N/A 09/28/2016    Procedure: ESOPHAGOGASTRODUODENOSCOPY (EGD);  Surgeon: Mylene Moeller MD;  [facility]: [facility];  Service:     HERNIA REPAIR      HYSTERECTOMY      TTAH-BSO age 28    LAMINECTOMY      LUMBAR LAMINECTOMY      OOPHORECTOMY      age 28    SD ARTHRODESIS POSTERIOR/PSTLAT TQ 1NTRSPC THORACIC N/A 06/04/2018    Procedure: Reopening of lumbar incision for T12-L5 posterior instrumented fixation and fusion and T12-L4 posterior decompression;  Surgeon: Wood Rogel MD;  Location: [facility];  Service: Neurosurgery    SD COLONOSCOPY FLX DX W/COLLJ SPEC WHEN PFRMD N/A 03/02/2016    Procedure: EGD AND COLONOSCOPY;  Surgeon: Mylene Moeller MD;  [facility]: [facility];  Service: Gastroenterology    SD DILATION ESOPH UNGUIDED SOUND/BOUGIE 1/MULT PASS N/A 09/28/2016    Procedure: DILATATION ESOPHAGEAL;  Surgeon: Mylene Moeller MD;  [facility];  Service: Gastroenterology    SD ESOPHAGOGASTRODUODENOSCOPY TRANSORAL DIAGNOSTIC N/A 07/18/2016    Procedure: ESOPHAGOGASTRODUODENOSCOPY (EGD);  Surgeon: Mylene Moeller MD;  [facility];  Service: Gastroenterology    SD INSJ/RPLCMT SPINAL NPG/RCVR POCKET CRTJ&CONNJ Left 06/04/2018    Procedure: removal of left  buttock implantable pulse generator and placement of new  implantable pulse generator;  Surgeon: Wood Rogel MD;  [facility];  Service: Neurosurgery      Family History   Problem Relation Age of Onset    Lung cancer Mother 46    Pulmonary embolism Father     No Known Problems Sister     No Known Problems Daughter     No Known Problems Daughter     Stroke Maternal Grandmother     Heart attack Maternal Grandfather     No Known Problems Paternal Grandmother     No Known Problems Paternal Grandfather     No Known Problems Maternal Aunt     Diabetes Family         Diabetes mellitus    Hypertension Family     Stroke Family         Stroke complications      Social History     Tobacco Use    Smoking status: Former     Current packs/day: 0.00     Types: Cigarettes     Quit date: 1970     Years since quittin.8    Smokeless tobacco: Never    Tobacco comments:     Denied history of current ever day smoker, Former smoker and Never smoker all documented in [employer]   Vaping Use    Vaping status: Never Used   Substance Use Topics    Alcohol use: Not Currently     Comment: Denied history of alcohol use    Drug use: No     Comment: Denied history of drug use      E-Cigarette/Vaping    E-Cigarette Use Never User       E-Cigarette/Vaping Substances    Nicotine No     THC No     CBD No     Flavoring No     Other No     Unknown No       I have reviewed and agree with the history as documented.     This is an 79-year-old female with a medical history significant for HTN, HLD, anxiety, DM, COPD, anemia, CHF, urinary incontinence who presents to the ED via EMS for evaluation of multiple issues.  Patient does report right-sided nonradiating chest pain that she describes as an aching sensation that began last night while she was sitting.  Reports pain is not exertional nature, nothing in particular makes the pain better or worse, states pain has been improving since onset.  States the pain is nonradiating.  Denies any  associated shortness of breath.  Patient does report increasing bilateral flank pain has been going on for several days.  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  She reports having UTI symptoms and flank pain since that time, urology had started on ciprofloxacin approximately 4 days prior but she states her symptoms have been worsening.  She reports increased urinary frequency, states she feels that she is retaining urine since yesterday.  Reports she last urinated earlier this morning.  Additionally, patient does report that her legs have been swelling over the last several days.  She also reports an unintentional 20 pound weight gain over the last few weeks.  Patient is on torsemide 20 mg daily, states she has been compliant with her medication.  Patient does report increased generalized weakness over the same time, states especially in her legs.  Reports she does get around with a walker at baseline.  Denies any focal weakness.  She denies any recent fevers, vision changes, neck pain, SOB, nausea, vomiting, diarrhea.      Chest Pain  Associated symptoms: abdominal pain and weakness (Reports generalized weakness)    Associated symptoms: no cough, no dizziness, no fever, no nausea, no shortness of breath and not vomiting        Review of Systems   Constitutional:  Positive for chills. Negative for fever.   HENT: Negative.     Eyes:  Negative for photophobia and visual disturbance.   Respiratory:  Negative for cough and shortness of breath.    Cardiovascular:  Positive for chest pain.   Gastrointestinal:  Positive for abdominal pain. Negative for diarrhea, nausea and vomiting.   Genitourinary:  Positive for difficulty urinating, flank pain (Reports bilateral flank pain) and frequency (Reports initially had increased urinary frequency.  States she feels she is retaining urine, last urinated this morning.). Negative for hematuria.    Musculoskeletal:  Negative for neck pain and neck stiffness.   Neurological:  Positive for weakness (Reports generalized weakness). Negative for dizziness, syncope and light-headedness.           Objective       ED Triage Vitals   Temperature Pulse Blood Pressure Respirations SpO2 Patient Position - Orthostatic VS   10/11/24 1214 10/11/24 1214 10/11/24 1214 10/11/24 1400 10/11/24 1214 10/11/24 1214   98 °F (36.7 °C) 65 134/65 16 95 % Lying      Temp Source Heart Rate Source BP Location FiO2 (%) Pain Score    10/11/24 1214 10/11/24 1214 10/11/24 1214 -- 10/11/24 1214    Oral Monitor Right arm  5      Vitals      Date and Time Temp Pulse SpO2 Resp BP Pain Score FACES Pain Rating User   10/11/24 1750 -- -- -- -- -- No Pain -- SD   10/11/24 1724 97.9 °F (36.6 °C) 84 96 % -- 117/78 -- -- DII   10/11/24 1724 97.9 °F (36.6 °C) 84 96 % -- 117/78 -- -- DII   10/11/24 1700 -- 85 97 % 20 165/60 -- -- AH   10/11/24 1600 -- 81 90 % 16 148/65 -- -- MG   10/11/24 1532 -- -- -- -- -- 6 -- EG   10/11/24 1529 -- -- -- -- -- 7 -- MF   10/11/24 1520 -- 81 96 % 16 120/83 -- -- MG   10/11/24 1448 -- -- -- -- -- 9 -- EG   10/11/24 1400 -- 78 96 % 16 147/66 10 - Worst Possible Pain -- MF   10/11/24 1214 98 °F (36.7 °C) 65 95 % -- 134/65 5 -- EG            Physical Exam  Vitals and nursing note reviewed.   Constitutional:       General: She is not in acute distress.     Appearance: She is well-developed. She is not toxic-appearing or diaphoretic.   HENT:      Head: Normocephalic and atraumatic.      Mouth/Throat:      Pharynx: Oropharynx is clear.   Eyes:      Conjunctiva/sclera: Conjunctivae normal.   Cardiovascular:      Rate and Rhythm: Normal rate and regular rhythm.      Pulses:           Carotid pulses are 2+ on the right side and 2+ on the left side.       Radial pulses are 2+ on the right side and 2+ on the left side.        Dorsalis pedis pulses are 2+ on the right side and 2+ on the left side.      Heart sounds: No murmur  heard.  Pulmonary:      Effort: Pulmonary effort is normal. No respiratory distress.      Breath sounds: Normal breath sounds. No decreased breath sounds, wheezing, rhonchi or rales.   Chest:      Chest wall: Tenderness present.       Abdominal:      Palpations: Abdomen is soft.      Tenderness: There is generalized abdominal tenderness. There is right CVA tenderness and left CVA tenderness. There is no guarding.   Musculoskeletal:         General: No swelling.      Cervical back: Neck supple.      Right lower le+ Edema present.      Left lower le+ Edema present.      Comments: Patient has normal range of motion in bilateral upper and lower extremities with proximal and distal sensation intact, normal distal pulses.   Skin:     General: Skin is warm and dry.      Capillary Refill: Capillary refill takes less than 2 seconds.   Neurological:      General: No focal deficit present.      Mental Status: She is alert and oriented to person, place, and time.      GCS: GCS eye subscore is 4. GCS verbal subscore is 5. GCS motor subscore is 6.      Cranial Nerves: Cranial nerves 2-12 are intact.   Psychiatric:         Mood and Affect: Mood normal.         Results Reviewed       Procedure Component Value Units Date/Time    B-Type Natriuretic Peptide(BNP) [[id number]]  (Normal) Collected: 10/11/24 1228    Lab Status: Final result Specimen: Blood from Arm, Right Updated: 10/11/24 1630     BNP 55 pg/mL     Lipase [[id number]]  (Normal) Collected: 10/11/24 1228    Lab Status: Final result Specimen: Blood from Arm, Right Updated: 10/11/24 1621     Lipase 42 u/L     HS Troponin I 2hr [[id number]]  (Normal) Collected: 10/11/24 1455    Lab Status: Final result Specimen: Blood from Arm, Right Updated: 10/11/24 1539     hs TnI 2hr 8 ng/L      Delta 2hr hsTnI -2 ng/L     UA w Reflex to Microscopic w Reflex to Culture [[id number]] Collected: 10/11/24 1507    Lab Status: Final result Specimen: Urine, Straight Cath Updated: 10/11/24  1520     Color, UA Light Yellow     Clarity, UA Clear     Specific Gravity, UA 1.010     pH, UA 6.0     Leukocytes, UA Negative     Nitrite, UA Negative     Protein, UA Negative mg/dl      Glucose, UA Negative mg/dl      Ketones, UA Negative mg/dl      Urobilinogen, UA <2.0 mg/dl      Bilirubin, UA Negative     Occult Blood, UA Negative    HS Troponin 0hr (reflex protocol) [[id number]]  (Normal) Collected: 10/11/24 1228    Lab Status: Final result Specimen: Blood from Arm, Right Updated: 10/11/24 1303     hs TnI 0hr 10 ng/L     Comprehensive metabolic panel [[id number]]  (Abnormal) Collected: 10/11/24 1228    Lab Status: Final result Specimen: Blood from Arm, Right Updated: 10/11/24 1302     Sodium 136 mmol/L      Potassium 5.0 mmol/L      Chloride 99 mmol/L      CO2 30 mmol/L      ANION GAP 7 mmol/L      BUN 29 mg/dL      Creatinine 1.65 mg/dL      Glucose 128 mg/dL      Calcium 9.4 mg/dL      AST 17 U/L      ALT 7 U/L      Alkaline Phosphatase 123 U/L      Total Protein 6.7 g/dL      Albumin 3.8 g/dL      Total Bilirubin 0.34 mg/dL      eGFR 28 ml/min/1.73sq m     Narrative:      National Kid[employer] Disease Foundation guidelines for Chronic Kidney Disease (CKD):     Stage 1 with normal or high GFR (GFR > 90 mL/min/1.73 square meters)    Stage 2 Mild CKD (GFR = 60-89 mL/min/1.73 square meters)    Stage 3A Moderate CKD (GFR = 45-59 mL/min/1.73 square meters)    Stage 3B Moderate CKD (GFR = 30-44 mL/min/1.73 square meters)    Stage 4 Severe CKD (GFR = 15-29 mL/min/1.73 square meters)    Stage 5 End Stage CKD (GFR <15 mL/min/1.73 square meters)  Note: GFR calculation is accurate only with a steady state creatinine    CBC and differential [[id number]]  (Abnormal) Collected: 10/11/24 1228    Lab Status: Final result Specimen: Blood from Arm, Right Updated: 10/11/24 1239     WBC 7.76 Thousand/uL      RBC 3.80 Million/uL      Hemoglobin 10.8 g/dL      Hematocrit 34.5 %      MCV 91 fL      MCH 28.4 pg      MCHC 31.3 g/dL      RDW  13.2 %      MPV 10.5 fL      Platelets 264 Thousands/uL      nRBC 0 /100 WBCs      Segmented % 51 %      Immature Grans % 0 %      Lymphocytes % 25 %      Monocytes % 9 %      Eosinophils Relative 14 %      Basophils Relative 1 %      Absolute Neutrophils 3.97 Thousands/µL      Absolute Immature Grans 0.01 Thousand/uL      Absolute Lymphocytes 1.91 Thousands/µL      Absolute Monocytes 0.69 Thousand/µL      Eosinophils Absolute 1.11 Thousand/µL      Basophils Absolute 0.07 Thousands/µL             CT chest abdomen pelvis wo contrast   Final Interpretation by Liam Potts MD (10/11 2104)      No evidence of consolidation or pleural effusion.      No evidence of hydronephrosis or stone.      Bladder distention with air-fluid level should be correlated with urinalysis and any recent instrumentation. Correlation with any voiding dysfunction is advised. Bateman catheter may be useful.      No small bowel dilatation.      The study is limited due to extensive streak artifacts from orthopedic hardware.      This was discussed with Lamonte GAMBOA at 3:23 p.m.               Workstation performed: [id number]         XR chest 1 view portable   Final Interpretation by Natan Sena MD (10/11 2798)      No acute cardiopulmonary disease.            Workstation performed: [id number]             ECG 12 Lead Documentation Only    Date/Time: 10/11/2024 1:56 PM    Performed by: Lamonte Minor PA-C  Authorized by: Lamonte Minor PA-C    Rate:     ECG rate:  68    ECG rate assessment: normal    Rhythm:     Rhythm: sinus rhythm    Ectopy:     Ectopy: none    QRS:     QRS axis:  Normal  Conduction:     Conduction: abnormal      Abnormal conduction: complete LBBB    ST segments:     ST segments:  Normal      ED Medication and Procedure Management   Prior to Admission Medications   Prescriptions Last Dose Informant Patient Reported? Taking?   Blood Glucose Monitoring Suppl (OneTouch Verio Reflect) w/Device KIT  Self No No   Sig: Check  blood sugars twice daily. Please substitute with appropriate alternative as covered by patient's insurance. Dx: E11.65   MAGNESIUM OXIDE 400 PO 10/11/2024 Self Yes Yes   Sig: Take 400 mg by mouth 2 (two) times a day   Milnacipran HCl (Savella) 100 MG TABS 10/11/2024 Self No Yes   Sig: Take 1 tablet (100 mg total) by mouth daily   OneTouch Delica Lancets 33G MISC  Self No No   Sig: Check blood sugars twice daily. Please substitute with appropriate alternative as covered by patient's insurance. Dx: E11.65   albuterol (PROVENTIL HFA,VENTOLIN HFA) 90 mcg/act inhaler 10/11/2024 Self No Yes   Sig: Inhale 2 puffs every 6 (six) hours as needed for wheezing or shortness of breath   aspirin 81 mg chewable tablet 10/11/2024 Self Yes Yes   Sig: Chew 81 mg daily   atorvastatin (LIPITOR) 40 mg tablet 10/11/2024 Self No Yes   Sig: TAKE ONE TABLET BY MOUTH ONCE DAILY   Patient taking differently: Take 40 mg by mouth daily   baclofen 10 mg tablet 10/11/2024 Self Yes Yes   Sig: Take 10 mg by mouth 2 (two) times a day as needed for muscle spasms   ciprofloxacin (CIPRO) 500 mg tablet 10/11/2024  No Yes   Sig: Take 1 tablet (500 mg total) by mouth every 12 (twelve) hours for 7 days   docusate sodium (COLACE) 100 mg capsule 10/11/2024 Self No Yes   Sig: Take 1 capsule (100 mg total) by mouth 2 (two) times a day   Patient taking differently: Take 100 mg by mouth 2 (two) times a day as needed   ferrous sulfate 325 (65 Fe) mg tablet 10/11/2024 Self No Yes   Sig: Take 1 tablet (325 mg total) by mouth daily with breakfast   glucose blood (OneTouch Verio) test strip  Self No No   Sig: Check blood sugars twice daily. Please substitute with appropriate alternative as covered by patient's insurance. Dx: E11.65   latanoprost (XALATAN) 0.005 % ophthalmic solution 10/11/2024 Self Yes Yes   Sig: Administer 1 drop to both eyes daily at bedtime   levothyroxine 75 mcg tablet 10/11/2024 Self No Yes   Sig: Take 0.5 tablets (37.5 mcg total) by mouth daily  in the early morning   meclizine (ANTIVERT) 25 mg tablet 10/11/2024 Self No Yes   Sig: Take 1 tablet (25 mg total) by mouth 4 (four) times a day as needed for dizziness   Patient taking differently: Take 25 mg by mouth daily as needed for dizziness   metFORMIN (GLUCOPHAGE) 1000 MG tablet 10/11/2024 Self, Outside Facility (Specify) No Yes   Sig: Take 1 tablet (1,000 mg total) by mouth 2 (two) times a day with meals   Patient taking differently: Take 500 mg by mouth 2 (two) times a day with meals   metoprolol succinate (TOPROL-XL) 50 mg 24 hr tablet 10/11/2024 Self No Yes   Sig: Take 1 tablet (50 mg total) by mouth every 12 (twelve) hours   oxyCODONE-acetaminophen (PERCOCET)  mg per tablet 10/11/2024 Self Yes Yes   Sig: Take 1 tablet by mouth every 6 (six) hours as needed   pantoprazole (PROTONIX) 40 mg tablet 10/11/2024 Self No Yes   Sig: Take 1 tablet (40 mg total) by mouth daily   polyethylene glycol (MIRALAX) 17 g packet 10/11/2024 Self No Yes   Sig: Take 17 g by mouth daily   senna (SENOKOT) 8.6 mg 10/11/2024 Self No Yes   Sig: Take 2 tablets (17.2 mg total) by mouth daily at bedtime   torsemide (DEMADEX) 20 mg tablet 10/11/2024 Outside Facility (Specify) Yes Yes   Sig: Take 20 mg by mouth daily      Facility-Administered Medications: None     Current Discharge Medication List        CONTINUE these medications which have NOT CHANGED    Details   albuterol (PROVENTIL HFA,VENTOLIN HFA) 90 mcg/act inhaler Inhale 2 puffs every 6 (six) hours as needed for wheezing or shortness of breath  Qty: 6.7 g, Refills: 5    Comments: Substitution to a formulary equivalent within the same pharmaceutical class is authorized.  Associated Diagnoses: Mild intermittent asthma without complication      aspirin 81 mg chewable tablet Chew 81 mg daily      atorvastatin (LIPITOR) 40 mg tablet TAKE ONE TABLET BY MOUTH ONCE DAILY  Qty: 90 tablet, Refills: 0    Associated Diagnoses: High cholesterol      baclofen 10 mg tablet Take 10 mg  by mouth 2 (two) times a day as needed for muscle spasms      ciprofloxacin (CIPRO) 500 mg tablet Take 1 tablet (500 mg total) by mouth every 12 (twelve) hours for 7 days  Qty: 14 tablet, Refills: 0    Associated Diagnoses: UTI symptoms      docusate sodium (COLACE) 100 mg capsule Take 1 capsule (100 mg total) by mouth 2 (two) times a day    Associated Diagnoses: Constipation, unspecified constipation type      ferrous sulfate 325 (65 Fe) mg tablet Take 1 tablet (325 mg total) by mouth daily with breakfast  Qty: 90 tablet, Refills: 0    Associated Diagnoses: Anemia, unspecified type      latanoprost (XALATAN) 0.005 % ophthalmic solution Administer 1 drop to both eyes daily at bedtime      levothyroxine 75 mcg tablet Take 0.5 tablets (37.5 mcg total) by mouth daily in the early morning    Associated Diagnoses: Hypothyroidism, unspecified type      MAGNESIUM OXIDE 400 PO Take 400 mg by mouth 2 (two) times a day      meclizine (ANTIVERT) 25 mg tablet Take 1 tablet (25 mg total) by mouth 4 (four) times a day as needed for dizziness  Qty: 60 tablet, Refills: 0    Associated Diagnoses: Vertigo      metFORMIN (GLUCOPHAGE) 1000 MG tablet Take 1 tablet (1,000 mg total) by mouth 2 (two) times a day with meals  Qty: 180 tablet, Refills: 3    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, managed as type 2 (HCC)      metoprolol succinate (TOPROL-XL) 50 mg 24 hr tablet Take 1 tablet (50 mg total) by mouth every 12 (twelve) hours  Qty: 60 tablet, Refills: 0    Associated Diagnoses: CAD (coronary artery disease)      Milnacipran HCl (Savella) 100 MG TABS Take 1 tablet (100 mg total) by mouth daily  Qty: 30 tablet, Refills: 3    Associated Diagnoses: Fibromyalgia      oxyCODONE-acetaminophen (PERCOCET)  mg per tablet Take 1 tablet by mouth every 6 (six) hours as needed      pantoprazole (PROTONIX) 40 mg tablet Take 1 tablet (40 mg total) by mouth daily    Comments: DX Code Needed  .  Associated Diagnoses: Gastroesophageal reflux disease  without esophagitis      polyethylene glycol (MIRALAX) 17 g packet Take 17 g by mouth daily    Associated Diagnoses: Constipation, unspecified constipation type      senna (SENOKOT) 8.6 mg Take 2 tablets (17.2 mg total) by mouth daily at bedtime    Associated Diagnoses: Constipation, unspecified constipation type      torsemide (DEMADEX) 20 mg tablet Take 20 mg by mouth daily      Blood Glucose Monitoring Suppl (OneTouch Verio Reflect) w/Device KIT Check blood sugars twice daily. Please substitute with appropriate alternative as covered by patient's insurance. Dx: E11.65  Qty: 1 kit, Refills: 0    Associated Diagnoses: Type 2 diabetes mellitus without complication, without long-term current use of insulin ([facility])      glucose blood (OneTouch Verio) test strip Check blood sugars twice daily. Please substitute with appropriate alternative as covered by patient's insurance. Dx: E11.65  Qty: 200 each, Refills: 3    Associated Diagnoses: Type 2 diabetes mellitus without complication, without long-term current use of insulin (HCC)      OneTouch Delica Lancets 33G MISC Check blood sugars twice daily. Please substitute with appropriate alternative as covered by patient's insurance. Dx: E11.65  Qty: 200 each, Refills: 3    Associated Diagnoses: Type 2 diabetes mellitus without complication, without long-term current use of insulin (HCC)           No discharge procedures on file.  ED SEPSIS DOCUMENTATION   Time reflects when diagnosis was documented in both MDM as applicable and the Disposition within this note       Time User Action Codes Description Comment    10/11/2024  4:30 PM Lamonte Minor [R07.9] Chest pain     10/11/2024  4:31 PM Lamonte Minor [I50.9] CHF exacerbation ([facility])     10/11/2024  4:31 PM Lamonte Minor [N17.9] SADAF (acute kidney injury) ([facility])                  Lamonte Minor PA-C  10/11/24 [id number]

## 2024-10-11 NOTE — ASSESSMENT & PLAN NOTE
Wt Readings from Last 3 Encounters:   10/11/24 86.9 kg (191 lb 9.3 oz)   10/02/24 85.5 kg (188 lb 9.6 oz)   09/26/24 86.2 kg (190 lb)     Patient has had a significant weight gain since most recent hospitalization as well as cardiology follow-up  She has evidence of bilateral pitting lower extremity edema  Start IV Lasix 60 mg IV twice daily  Daily weights/ins and outs  Recent echo with preserved EF  Cardiology consultation

## 2024-10-11 NOTE — H&P
"H&P - Hospitalist   Name: Mattie Varela 79 y.o. female I MRN: [id number]  Unit/Bed#: E5 -01 I Date of Admission: 10/11/2024   Date of Service: 10/11/2024 I Hospital Day: 0     Assessment & Plan  Acute on chronic diastolic congestive heart failure (HCC)  Wt Readings from Last 3 Encounters:   10/11/24 86.9 kg (191 lb 9.3 oz)   10/02/24 85.5 kg (188 lb 9.6 oz)   09/26/24 86.2 kg (190 lb)     Patient has had a significant weight gain since most recent hospitalization as well as cardiology follow-up  She has evidence of bilateral pitting lower extremity edema  Start IV Lasix 60 mg IV twice daily  Daily weights/ins and outs  Recent echo with preserved EF  Cardiology consultation        Degenerative lumbar spinal stenosis  Status post spinal stimulator utilizes baclofen as needed and Percocet 10 mg 3 times daily as needed  Type 2 diabetes mellitus with other skin complications (HCC)  Lab Results   Component Value Date    HGBA1C 6.5 (H) 11/26/2023       No results for input(s): \"POCGLU\" in the last 72 hours.    Blood Sugar Average: Last 72 hrs:    Carb controlled diet  Sliding scale insulin  Chronic pain disorder  Reviewed PDMP as well as facility paperwork  Continue home regiment  Hypertension  Continue metoprolol succinate twice daily  Hypothyroidism  Continue Synthroid  Overactive bladder  Received Botox injections in the past  Urinary retention protocol  Cerebrovascular accident (CVA), unspecified mechanism (HCC)  Continue aspirin, statin  PT/OT  Chronic obstructive pulmonary disease, unspecified COPD type (HCC)  Not in acute exacerbation  Continue albuterol as needed  SADAF (acute kidney injury) (HCC)  Suspect cardiorenal in setting of heart failure  Monitor BMP with diuretics  Consider nephrology evaluation  Chest pain  EKG normal sinus rhythm without ischemic changes  Troponin unremarkable  Suspect related to heart failure, component of anxiety  Monitor telemetry  Acute cystitis  Patient was diagnosed with " acute cystitis as an outpatient was started on ciprofloxacin 7-day course through 10/15/2024  Continue renal adjusted dose through 10/15/2024    VTE Prophylaxis: Heparin  Code Status: Level 1 full code    Anticipated Length of Stay:  Patient will be admitted on an Inpatient basis with an anticipated length of stay of greater than 2 midnights.   Justification for Hospital Stay: Need for IV diuretics for management of acute heart failure, ACS rule out    Total Time for Visit, including Counseling / Coordination of Care: I have spent a total time of 80 minutes on 10/11/24 in caring for this patient including Diagnostic results, Risks and benefits of tx options, Instructions for management, Importance of tx compliance, Impressions, Counseling / Coordination of care, Documenting in the medical record, Reviewing / ordering tests, medicine, procedures  , Obtaining or reviewing history  , and Communicating with other healthcare professionals .    Chief Complaint:   Chest Pain     History of Present Illness:    Mattie Varela is a 79 y.o. female With past medical history of chronic diastolic CHF, hypertension, CAD, CKD, CVA who presents to the hospital with midsternal stabbing chest pain that radiates to the right side beginning last night.  Patient was recently initiated on ciprofloxacin 500 mg twice daily for UTI like symptoms 10/7/2024 for 7-day course.  At the bedside patient reports that she still has some chest discomfort.  Mostly she feels some dyspnea on exertion and is noticing some lower leg edema.  She also has right flank pain which she says is a part of her spinal stenosis and worse with the UTI.    Review of Systems:    Review of Systems   Constitutional:  Positive for activity change. Negative for chills and fever.   HENT:  Negative for trouble swallowing.    Eyes:  Negative for photophobia and visual disturbance.   Respiratory:  Positive for shortness of breath. Negative for chest tightness and wheezing.     Cardiovascular:  Positive for chest pain. Negative for palpitations.   Gastrointestinal:  Negative for constipation, diarrhea, nausea and vomiting.   Genitourinary:  Positive for flank pain. Negative for difficulty urinating and dysuria.   Musculoskeletal:  Negative for arthralgias and myalgias.   Skin:  Negative for rash and wound.   Neurological:  Negative for dizziness, light-headedness and headaches.       Past Medical and Surgical History:     Past Medical History:   Diagnosis Date    Acid reflux     Acute kidney injury (HCC)     Anemia     hx of iron-deficient    Anxiety     Arthritis     Asthma     last needed inhaler last year 2020    Basal cell carcinoma     upper lip    Chronic narcotic dependence (HCC)     Chronic pain     Colon polyp     Cystocele     Diabetes mellitus (HCC)     stable    Disease of thyroid gland     hypothyroidism    Diverticulosis     Dizziness     at times    Dysfunctional uterine bleeding     last assessed - [id number]    Dysphagia     Fibromyalgia     Gastric ulcer     Gastroparesis     History of colonic polyps     last assessed - [id number]    History of gastroesophageal reflux (GERD)     Hypercholesterolemia     Hyperlipidemia     Hypertension     Hyponatremia     IBS (irritable bowel syndrome)     Pneumobilia 06/18/2022    Post laminectomy syndrome     S/P insertion of spinal cord stimulator 07/18/2018    s/p [employer] loop recorder 3/2/2024 03/02/2024    Seasonal allergies     Shortness of breath     exertional    Spinal stenosis     Status post lumbar spinal fusion 03/16/2018    Stroke ([facility])     pt states slight stroke March 2022       Past Surgical History:   Procedure Laterality Date    APPENDECTOMY      BACK SURGERY      BREAST CYST EXCISION Left     CARDIAC CATHETERIZATION  11/14/2023    Procedure: Cardiac catheterization;  Surgeon: Randolph Holt MD;  [facility];  Service: Cardiology    CARDIAC CATHETERIZATION N/A 11/14/2023    Procedure: Cardiac  Coronary Angiogram;  Surgeon: Randolph Holt MD;  [facility];  Service: Cardiology    CARDIAC ELECTROPHYSIOLOGY PROCEDURE N/A 3/2/2024    Procedure: Cardiac loop recorder implant;  Surgeon: Edu Fontaine MD;  [facility];  Service: Cardiology    CHOLECYSTECTOMY      COLONOSCOPY      ESOPHAGOGASTRODUODENOSCOPY N/A 09/28/2016    Procedure: ESOPHAGOGASTRODUODENOSCOPY (EGD);  Surgeon: Mylene Moeller MD;  Location: [facility];  Service:     HERNIA REPAIR      HYSTERECTOMY      TTAH-BSO age 28    LAMINECTOMY      LUMBAR LAMINECTOMY      OOPHORECTOMY      age 28    TN ARTHRODESIS POSTERIOR/PSTLAT TQ 1NTRSPC THORACIC N/A 06/04/2018    Procedure: Reopening of lumbar incision for T12-L5 posterior instrumented fixation and fusion and T12-L4 posterior decompression;  Surgeon: Wood Rogel MD;  Location: [facility];  Service: Neurosurgery    TN COLONOSCOPY FLX DX W/COLLJ SPEC WHEN PFRMD N/A 03/02/2016    Procedure: EGD AND COLONOSCOPY;  Surgeon: Mylene Moeller MD;  [facility]: [facility];  Service: Gastroenterology    TN DILATION ESOPH UNGUIDED SOUND/BOUGIE 1/MULT PASS N/A 09/28/2016    Procedure: DILATATION ESOPHAGEAL;  Surgeon: Mylene Moeller MD;  [facility]: [facility];  Service: Gastroenterology    TN ESOPHAGOGASTRODUODENOSCOPY TRANSORAL DIAGNOSTIC N/A 07/18/2016    Procedure: ESOPHAGOGASTRODUODENOSCOPY (EGD);  Surgeon: Mylene Moeller MD;  [facility]: [facility];  Service: Gastroenterology    TN INSJ/RPLCMT SPINAL NPG/RCVR POCKET CRTJ&CONNJ Left 06/04/2018    Procedure: removal of left buttock implantable pulse generator and placement of new  implantable pulse generator;  Surgeon: Wood Rogel MD;  [facility]: [facility];  Service: Neurosurgery       Meds/Allergies:    Prior to Admission medications    Medication Sig Start Date End Date Taking? Authorizing Provider   albuterol (PROVENTIL HFA,VENTOLIN HFA) 90 mcg/act inhaler Inhale 2 puffs every 6 (six) hours as needed for wheezing or shortness of  breath 6/29/22  Yes NANY Pollock   aspirin 81 mg chewable tablet Chew 81 mg daily   Yes Historical Provider, MD   atorvastatin (LIPITOR) 40 mg tablet TAKE ONE TABLET BY MOUTH ONCE DAILY  Patient taking differently: Take 40 mg by mouth daily 11/22/22  Yes NANY Pollock   baclofen 10 mg tablet Take 10 mg by mouth 2 (two) times a day as needed for muscle spasms   Yes Historical Provider, MD   ciprofloxacin (CIPRO) 500 mg tablet Take 1 tablet (500 mg total) by mouth every 12 (twelve) hours for 7 days 10/7/24 10/14/24 Yes Debbie Chris PA-C   ferrous sulfate 325 (65 Fe) mg tablet Take 1 tablet (325 mg total) by mouth daily with breakfast 1/10/24  Yes Paco Boyer MD   latanoprost (XALATAN) 0.005 % ophthalmic solution Administer 1 drop to both eyes daily at bedtime   Yes Historical Provider, MD   levothyroxine 75 mcg tablet Take 0.5 tablets (37.5 mcg total) by mouth daily in the early morning 1/17/24 10/11/24 Yes Alejandro Philip MD   MAGNESIUM OXIDE 400 PO Take 400 mg by mouth 2 (two) times a day   Yes Historical Provider, MD   meclizine (ANTIVERT) 25 mg tablet Take 1 tablet (25 mg total) by mouth 4 (four) times a day as needed for dizziness  Patient taking differently: Take 25 mg by mouth daily as needed for dizziness 3/26/23 10/11/24 Yes Venice Baires DO   metFORMIN (GLUCOPHAGE) 1000 MG tablet Take 1 tablet (1,000 mg total) by mouth 2 (two) times a day with meals  Patient taking differently: Take 500 mg by mouth 2 (two) times a day with meals 2/10/23  Yes NANY Pollock   metoprolol succinate (TOPROL-XL) 50 mg 24 hr tablet Take 1 tablet (50 mg total) by mouth every 12 (twelve) hours 11/15/23  Yes Gustabo Lara MD   Milnacipran HCl (Savella) 100 MG TABS Take 1 tablet (100 mg total) by mouth daily 3/5/23  Yes Venice Baires DO   oxyCODONE-acetaminophen (PERCOCET)  mg per tablet Take 1 tablet by mouth every 6 (six) hours as needed   Yes Historical Provider, MD    pantoprazole (PROTONIX) 40 mg tablet Take 1 tablet (40 mg total) by mouth daily 1/10/24 10/11/24 Yes Paco Boyer MD   senna (SENOKOT) 8.6 mg Take 2 tablets (17.2 mg total) by mouth daily at bedtime 6/5/24  Yes NANY Ruby   torsemide (DEMADEX) 20 mg tablet Take 20 mg by mouth daily   Yes Historical Provider, MD   Blood Glucose Monitoring Suppl (OneTouch Verio Reflect) w/Device KIT Check blood sugars twice daily. Please substitute with appropriate alternative as covered by patient's insurance. Dx: E11.65 2/20/23   NANY Pollock   clopidogrel (Plavix) 75 mg tablet Take 1 tablet (75 mg total) by mouth daily 12/1/23 10/2/24  Faheem Anne PA-C   docusate sodium (COLACE) 100 mg capsule Take 1 capsule (100 mg total) by mouth 2 (two) times a day  Patient taking differently: Take 100 mg by mouth 2 (two) times a day as needed 6/5/24   NANY Ruby   glucose blood (OneTouch Verio) test strip Check blood sugars twice daily. Please substitute with appropriate alternative as covered by patient's insurance. Dx: E11.65 2/20/23   NANY Pollock   OneTouch Delica Lancets 33G MISC Check blood sugars twice daily. Please substitute with appropriate alternative as covered by patient's insurance. Dx: E11.65 2/20/23   NANY Pollock   polyethylene glycol (MIRALAX) 17 g packet Take 17 g by mouth daily 6/5/24   NANY Ruby   trospium (SANCTURA XR) 60 mg 24 hr capsule Take 1 capsule (60 mg total) by mouth daily before breakfast 4/3/23   NANY Pollock   furosemide (LASIX) 20 mg tablet Take 1 tablet (20 mg total) by mouth daily  Patient taking differently: Take 40 mg by mouth daily 6/6/24 10/11/24  NANY Ruby   sucralfate (CARAFATE) 1 g tablet Take 1 tablet (1 g total) by mouth 4 (four) times a day 7/18/22 10/13/22  Reese Peña MD       Allergies:   Allergies   Allergen Reactions    Penicillins Anaphylaxis, Hives and Other (See Comments)     Other  reaction(s): Unknown Reaction    Sulfa Antibiotics Anaphylaxis and Other (See Comments)     Other reaction(s): Unknown Reaction    Aspartame - Food Allergy Other (See Comments) and Hypertension     Slurred speech, weakness, stroke sx    Iodinated Contrast Media Hives     Pt has taken prep prior for contrast and has not had any break through reaction    Keflex [Cephalexin] Hives       Social History:     Marital Status:    Substance Use History:   Social History     Substance and Sexual Activity   Alcohol Use Not Currently    Comment: Denied history of alcohol use     Social History     Tobacco Use   Smoking Status Former    Current packs/day: 0.00    Types: Cigarettes    Quit date: 1970    Years since quittin.8   Smokeless Tobacco Never   Tobacco Comments    Denied history of current ever day smoker, Former smoker and Never smoker all documented in [employer]     Social History     Substance and Sexual Activity   Drug Use No    Comment: Denied history of drug use       Family History:    Pertinent family history reviewed    Physical Exam:     Vitals:   Blood Pressure: 112/73 (10/11/24 1950)  Pulse: 83 (10/11/24 1950)  Temperature: 97.9 °F (36.6 °C) (10/11/24 1724)  Temp Source: Oral (10/11/24 1214)  Respirations: 20 (10/11/24 1700)  Height: 5' (152.4 cm) (10/11/24 1700)  Weight - Scale: 86.9 kg (191 lb 9.3 oz) (10/11/24 1214)  SpO2: 96 % (10/11/24 1950)    Physical Exam  Vitals reviewed.   Constitutional:       General: She is not in acute distress.     Appearance: She is well-developed. She is not ill-appearing, toxic-appearing or diaphoretic.   HENT:      Head: Normocephalic and atraumatic.      Mouth/Throat:      Mouth: Mucous membranes are moist.   Eyes:      General: No scleral icterus.     Extraocular Movements: Extraocular movements intact.   Cardiovascular:      Rate and Rhythm: Normal rate and regular rhythm.      Heart sounds: Normal heart sounds.   Pulmonary:      Effort: Pulmonary effort  is normal. No respiratory distress.      Breath sounds: Normal breath sounds. No wheezing or rales.      Comments: Decreased breath sounds in bases bilaterally  Abdominal:      General: There is no distension.      Palpations: Abdomen is soft.      Tenderness: There is no abdominal tenderness. There is no guarding or rebound.   Musculoskeletal:         General: No swelling, tenderness or deformity.      Right lower leg: Edema present.      Left lower leg: Edema present.   Skin:     General: Skin is warm and dry.   Neurological:      General: No focal deficit present.      Mental Status: She is alert. Mental status is at baseline.   Psychiatric:         Mood and Affect: Mood normal.         Behavior: Behavior normal.         Thought Content: Thought content normal.         Judgment: Judgment normal.          Additional Data:     Lab Results: I have reviewed pertinent results     Results from last 7 days   Lab Units 10/11/24  1228   WBC Thousand/uL 7.76   HEMOGLOBIN g/dL 10.8*   HEMATOCRIT % 34.5*   PLATELETS Thousands/uL 264   SEGS PCT % 51   LYMPHO PCT % 25   MONO PCT % 9   EOS PCT % 14*     Results from last 7 days   Lab Units 10/11/24  1228   SODIUM mmol/L 136   POTASSIUM mmol/L 5.0   CHLORIDE mmol/L 99   CO2 mmol/L 30   BUN mg/dL 29*   CREATININE mg/dL 1.65*   ANION GAP mmol/L 7   CALCIUM mg/dL 9.4   ALBUMIN g/dL 3.8   TOTAL BILIRUBIN mg/dL 0.34   ALK PHOS U/L 123*   ALT U/L 7   AST U/L 17   GLUCOSE RANDOM mg/dL 128                       Imaging: I have reviewed pertinent imaging     CT chest abdomen pelvis wo contrast   Final Result by Liam Potts MD (10/11 3066)      No evidence of consolidation or pleural effusion.      No evidence of hydronephrosis or stone.      Bladder distention with air-fluid level should be correlated with urinalysis and any recent instrumentation. Correlation with any voiding dysfunction is advised. Bateman catheter may be useful.      No small bowel dilatation.      The study is  limited due to extensive streak artifacts from orthopedic hardware.      This was discussed with Lamonte GAMBOA at 3:23 p.m.               Workstation performed: [id number]         XR chest 1 view portable   Final Result by Natan Sena MD (10/11 5390)      No acute cardiopulmonary disease.            Workstation performed: [id number]             EKG, Pathology, and Other Studies Reviewed on Admission:   EKG: Normal sinus rhythm    Allscripts / Epic Records Reviewed    ** Please Note: This note has been constructed using a voice recognition system. **

## 2024-10-11 NOTE — TELEPHONE ENCOUNTER
I called and scheduled patient for Botox with DAPHNE on 2/12/25. She was going to be getting admit to [facility] when I called her and asked for a call back in 1 week to confirm date and time with her. When she confirms appt, please send an encounter to [facility] for Botox ordering and cancel April visit with DAPHNE.    Normal for race

## 2024-10-12 LAB
ANION GAP SERPL CALCULATED.3IONS-SCNC: 9 MMOL/L (ref 4–13)
BUN SERPL-MCNC: 33 MG/DL (ref 5–25)
CALCIUM SERPL-MCNC: 9.1 MG/DL (ref 8.4–10.2)
CHLORIDE SERPL-SCNC: 99 MMOL/L (ref 96–108)
CO2 SERPL-SCNC: 30 MMOL/L (ref 21–32)
CREAT SERPL-MCNC: 1.5 MG/DL (ref 0.6–1.3)
ERYTHROCYTE [DISTWIDTH] IN BLOOD BY AUTOMATED COUNT: 13.2 % (ref 11.6–15.1)
EST. AVERAGE GLUCOSE BLD GHB EST-MCNC: 166 MG/DL
GFR SERPL CREATININE-BSD FRML MDRD: 32 ML/MIN/1.73SQ M
GLUCOSE SERPL-MCNC: 127 MG/DL (ref 65–140)
GLUCOSE SERPL-MCNC: 135 MG/DL (ref 65–140)
GLUCOSE SERPL-MCNC: 143 MG/DL (ref 65–140)
GLUCOSE SERPL-MCNC: 210 MG/DL (ref 65–140)
GLUCOSE SERPL-MCNC: 244 MG/DL (ref 65–140)
HBA1C MFR BLD: 7.4 %
HCT VFR BLD AUTO: 33.4 % (ref 34.8–46.1)
HGB BLD-MCNC: 10.8 G/DL (ref 11.5–15.4)
MAGNESIUM SERPL-MCNC: 2.2 MG/DL (ref 1.9–2.7)
MCH RBC QN AUTO: 29 PG (ref 26.8–34.3)
MCHC RBC AUTO-ENTMCNC: 32.3 G/DL (ref 31.4–37.4)
MCV RBC AUTO: 90 FL (ref 82–98)
PLATELET # BLD AUTO: 265 THOUSANDS/UL (ref 149–390)
PMV BLD AUTO: 10.3 FL (ref 8.9–12.7)
POTASSIUM SERPL-SCNC: 4.2 MMOL/L (ref 3.5–5.3)
RBC # BLD AUTO: 3.73 MILLION/UL (ref 3.81–5.12)
SODIUM SERPL-SCNC: 138 MMOL/L (ref 135–147)
WBC # BLD AUTO: 8.56 THOUSAND/UL (ref 4.31–10.16)

## 2024-10-12 PROCEDURE — 82948 REAGENT STRIP/BLOOD GLUCOSE: CPT

## 2024-10-12 PROCEDURE — 99232 SBSQ HOSP IP/OBS MODERATE 35: CPT | Performed by: PHYSICIAN ASSISTANT

## 2024-10-12 PROCEDURE — 80048 BASIC METABOLIC PNL TOTAL CA: CPT | Performed by: INTERNAL MEDICINE

## 2024-10-12 PROCEDURE — 87081 CULTURE SCREEN ONLY: CPT | Performed by: INTERNAL MEDICINE

## 2024-10-12 PROCEDURE — 85027 COMPLETE CBC AUTOMATED: CPT | Performed by: INTERNAL MEDICINE

## 2024-10-12 PROCEDURE — 83036 HEMOGLOBIN GLYCOSYLATED A1C: CPT | Performed by: INTERNAL MEDICINE

## 2024-10-12 PROCEDURE — 99222 1ST HOSP IP/OBS MODERATE 55: CPT | Performed by: STUDENT IN AN ORGANIZED HEALTH CARE EDUCATION/TRAINING PROGRAM

## 2024-10-12 PROCEDURE — 83735 ASSAY OF MAGNESIUM: CPT | Performed by: INTERNAL MEDICINE

## 2024-10-12 RX ADMIN — ATORVASTATIN CALCIUM 40 MG: 40 TABLET, FILM COATED ORAL at 09:54

## 2024-10-12 RX ADMIN — FUROSEMIDE 60 MG: 10 INJECTION, SOLUTION INTRAVENOUS at 18:24

## 2024-10-12 RX ADMIN — LEVOTHYROXINE SODIUM 37.5 MCG: 75 TABLET ORAL at 05:36

## 2024-10-12 RX ADMIN — HEPARIN SODIUM 5000 UNITS: 5000 INJECTION INTRAVENOUS; SUBCUTANEOUS at 21:42

## 2024-10-12 RX ADMIN — POLYETHYLENE GLYCOL 3350 17 G: 17 POWDER, FOR SOLUTION ORAL at 09:55

## 2024-10-12 RX ADMIN — CIPROFLOXACIN 500 MG: 500 TABLET ORAL at 12:13

## 2024-10-12 RX ADMIN — FUROSEMIDE 60 MG: 10 INJECTION, SOLUTION INTRAVENOUS at 09:55

## 2024-10-12 RX ADMIN — BACLOFEN 10 MG: 10 TABLET ORAL at 09:55

## 2024-10-12 RX ADMIN — ACETAMINOPHEN 650 MG: 325 TABLET, FILM COATED ORAL at 09:54

## 2024-10-12 RX ADMIN — OXYCODONE HYDROCHLORIDE 10 MG: 10 TABLET ORAL at 05:37

## 2024-10-12 RX ADMIN — OXYCODONE HYDROCHLORIDE 10 MG: 10 TABLET ORAL at 16:38

## 2024-10-12 RX ADMIN — ACETAMINOPHEN 650 MG: 325 TABLET, FILM COATED ORAL at 16:14

## 2024-10-12 RX ADMIN — FERROUS SULFATE TAB 325 MG (65 MG ELEMENTAL FE) 325 MG: 325 (65 FE) TAB at 16:14

## 2024-10-12 RX ADMIN — HEPARIN SODIUM 5000 UNITS: 5000 INJECTION INTRAVENOUS; SUBCUTANEOUS at 14:18

## 2024-10-12 RX ADMIN — HEPARIN SODIUM 5000 UNITS: 5000 INJECTION INTRAVENOUS; SUBCUTANEOUS at 05:37

## 2024-10-12 RX ADMIN — Medication 400 MG: at 09:55

## 2024-10-12 RX ADMIN — LATANOPROST 1 DROP: 50 SOLUTION OPHTHALMIC at 22:04

## 2024-10-12 RX ADMIN — SENNOSIDES 17.2 MG: 8.6 TABLET, FILM COATED ORAL at 21:43

## 2024-10-12 RX ADMIN — ASPIRIN 81 MG CHEWABLE TABLET 81 MG: 81 TABLET CHEWABLE at 09:55

## 2024-10-12 RX ADMIN — INSULIN LISPRO 2 UNITS: 100 INJECTION, SOLUTION INTRAVENOUS; SUBCUTANEOUS at 16:14

## 2024-10-12 RX ADMIN — METOPROLOL SUCCINATE 50 MG: 50 TABLET, EXTENDED RELEASE ORAL at 09:55

## 2024-10-12 RX ADMIN — Medication 400 MG: at 21:43

## 2024-10-12 RX ADMIN — PANTOPRAZOLE SODIUM 40 MG: 40 TABLET, DELAYED RELEASE ORAL at 07:40

## 2024-10-12 RX ADMIN — METOPROLOL SUCCINATE 50 MG: 50 TABLET, EXTENDED RELEASE ORAL at 21:43

## 2024-10-12 RX ADMIN — INSULIN LISPRO 2 UNITS: 100 INJECTION, SOLUTION INTRAVENOUS; SUBCUTANEOUS at 12:13

## 2024-10-12 RX ADMIN — ACETAMINOPHEN 650 MG: 325 TABLET, FILM COATED ORAL at 01:53

## 2024-10-12 NOTE — CONSULTS
Consultation - Cardiology   Mattie Varela 79 y.o. female MRN: [id number]  Unit/Bed#: E5 -01 Encounter: [id number]      Assessment and Plan:    Acute on chronic diastolic congestive heart failure (HCC)  -TTE 5/23/2024 showed EF 70%, grade 1 DD, mild dynamic LVOT obstruction with a peak gradient of 25 mmHg with Valsalva, mild TR  - BNP 55 on admission  - Chest x-ray on admission showed no acute cardiopulmonary disease  - CT chest abdomen pelvis 10/11/2024 showed no evidence of consolidation or pleural effusion  - Discharge weight 5/30/2024 166 pounds, office weight 7/24/2024 176 pounds, weight on admission was 191 pounds  - Outpatient diuretic Rx furosemide 20 mg p.o. daily-> changed to torsemide 30 mg daily on 9/26/2024 due to weight gain  - Current diuretic Rx furosemide 60 mg IV twice daily    Precordial chest pain  - Patient midsternal chest discomfort radiating to her right side prior to admission  - No acute ischemic changes on ECG  - Troponins negative x 2 on admission  - No evidence of ACS at this time    Coronary artery disease  - LHC 11/14/2024 showed diffuse calcified CAD with no focal lesions, OM1 70% with normal IFR, no interventions performed  - Continue with aspirin 81 mg daily    Hypertension  - Outpatient Rx Toprol-XL 50 mg twice daily    Hyperlipidemia  - Continue with atorvastatin 40 mg daily    Cerebrovascular accident (CVA), unspecified mechanism (HCC)  -CTA November 2023 showed moderate stenosis of the distal right MCA M1, focal moderate stenosis involving the inferior basilar artery with a diffuse tubular dilation at the right ICA origin  - Continue with aspirin 81 mg daily and atorvastatin 40 mg daily  - Underwent Medtronic loop recorder implantation on 3/2/2024  - No recorded events as of last interrogation on 9/17/2024    Degenerative lumbar spinal stenosis    Type 2 diabetes mellitus with other skin complications (HCC)    Chronic pain disorder    Chronic obstructive pulmonary  disease, unspecified COPD type (HCC)    SADAF (acute kidney injury) (HCC)    Acute cystitis    Hypothyroidism    Overactive bladder    Summary:  -Unclear cause for patient's chest discomfort, no evidence of ACS  - Appears to be noncardiac chest pain  - Patient is significant lower extremity edema and weight gain, no evidence of pulmonary edema at this time  - It appears her weight had increased during her nephrology appointment on 9/26/2024 and her furosemide 20 mg daily was increased to torsemide 30 mg daily  -Continue with furosemide 60 mg IV twice daily for diuresis  - Check daily standing weights  - Monitor creatinine and electrolytes closely    History of Present Illness   Physician Requesting Consult: Royer Carballo DO  Reason for Consult / Principal Problem: Chest pain and CHF exacerbation  HPI: Mattie Varela is a 79 y.o. year old female who presents with chest pain.  She has past medical history of diastolic CHF, CAD, hypertension, hyperlipidemia, type 2 diabetes, prior CVA, COPD, overactive bladder, and hypothyroidism.  Patient presents to the emergency department with chest discomfort, bilateral flank pain, increased urinary frequency and an unintentional 20 pound weight gain over the last 2 weeks associated with lower extremity swelling.  She reports for about 18 hours prior to admission, she was having poking/needle like sensation in her hands, feet and lower jaw.  Associated with this, she was also having a sharp chest discomfort that radiated to the right side of her chest.  She reports the chest discomfort was initially 7 or 8 out of 10.  She reports these pains were constant and there was no significant exacerbating or alleviating factors.  Denies any change in her pains with activity, eating, or breathing.  She reports still having these discomforts, but they are now her for or 5 out of 10 in intensity.  For the last couple weeks prior to admission, she also reports noticing weight gain and  worsening lower extremity edema.  Also reports having some fluid blisters on her left lower extremity.  Denies any change in her diet or missing her medications  Patient saw nephrology on 9/26/2024 and her weight increased to 190 pounds.  Due to this, her furosemide 20 mg daily was changed to torsemide 30 mg daily to diurese her.    Inpatient consult to Cardiology  Consult performed by: Natan Guzman MD  Consult ordered by: Alexys Angel DO          Review of Systems:  Review of Systems   Constitutional:  Positive for unexpected weight change. Negative for chills, diaphoresis, fatigue and fever.   HENT:  Negative for congestion.    Eyes:  Negative for photophobia and visual disturbance.   Respiratory:  Negative for chest tightness and shortness of breath.    Cardiovascular:  Positive for chest pain and leg swelling. Negative for palpitations.   Gastrointestinal:  Negative for abdominal distention, abdominal pain, diarrhea, nausea and vomiting.   Genitourinary:  Negative for difficulty urinating and dysuria.   Musculoskeletal:  Positive for arthralgias and myalgias. Negative for gait problem and joint swelling.   Skin:  Negative for color change, pallor and rash.   Neurological:  Negative for dizziness, syncope, numbness and headaches.   Psychiatric/Behavioral:  Negative for agitation, behavioral problems and confusion.          Historical Information   Past Medical History:   Diagnosis Date    Acid reflux     Acute kidney injury (HCC)     Anemia     hx of iron-deficient    Anxiety     Arthritis     Asthma     last needed inhaler last year 2020    Basal cell carcinoma     upper lip    Chronic narcotic dependence (HCC)     Chronic pain     Colon polyp     Cystocele     Diabetes mellitus (HCC)     stable    Disease of thyroid gland     hypothyroidism    Diverticulosis     Dizziness     at times    Dysfunctional uterine bleeding     last assessed - [id number]    Dysphagia     Fibromyalgia     Gastric ulcer      Gastroparesis     History of colonic polyps     last assessed - [id number]    History of gastroesophageal reflux (GERD)     Hypercholesterolemia     Hyperlipidemia     Hypertension     Hyponatremia     IBS (irritable bowel syndrome)     Pneumobilia 06/18/2022    Post laminectomy syndrome     S/P insertion of spinal cord stimulator 07/18/2018    s/p Medtronic loop recorder 3/2/2024 03/02/2024    Seasonal allergies     Shortness of breath     exertional    Spinal stenosis     Status post lumbar spinal fusion 03/16/2018    Stroke (HCC)     pt states slight stroke March 2022     Past Surgical History:   Procedure Laterality Date    APPENDECTOMY      BACK SURGERY      BREAST CYST EXCISION Left     CARDIAC CATHETERIZATION  11/14/2023    Procedure: Cardiac catheterization;  Surgeon: Randolph Holt MD;  [facility];  Service: Cardiology    CARDIAC CATHETERIZATION N/A 11/14/2023    Procedure: Cardiac Coronary Angiogram;  Surgeon: Randolph Holt MD;  [facility];  Service: Cardiology    CARDIAC ELECTROPHYSIOLOGY PROCEDURE N/A 3/2/2024    Procedure: Cardiac loop recorder implant;  Surgeon: Edu Fontaine MD;  [facility];  Service: Cardiology    CHOLECYSTECTOMY      COLONOSCOPY      ESOPHAGOGASTRODUODENOSCOPY N/A 09/28/2016    Procedure: ESOPHAGOGASTRODUODENOSCOPY (EGD);  Surgeon: Mylene Moeller MD;  [facility]: [facility];  Service:     HERNIA REPAIR      HYSTERECTOMY      TTAH-BSO age 28    LAMINECTOMY      LUMBAR LAMINECTOMY      OOPHORECTOMY      age 28    LA ARTHRODESIS POSTERIOR/PSTLAT TQ 1NTRSPC THORACIC N/A 06/04/2018    Procedure: Reopening of lumbar incision for T12-L5 posterior instrumented fixation and fusion and T12-L4 posterior decompression;  Surgeon: Wood Rogel MD;  Location: [facility];  Service: Neurosurgery    LA COLONOSCOPY FLX DX W/COLLJ SPEC WHEN PFRMD N/A 03/02/2016    Procedure: EGD AND COLONOSCOPY;  Surgeon: Mylene Moeller MD;  [facility]: [facility];  Service: Gastroenterology    KS DILATION ESOPH UNGUIDED SOUND/BOUGIE 1/MULT PASS N/A 2016    Procedure: DILATATION ESOPHAGEAL;  Surgeon: Mylene Moeller MD;  [facility];  Service: Gastroenterology    KS ESOPHAGOGASTRODUODENOSCOPY TRANSORAL DIAGNOSTIC N/A 2016    Procedure: ESOPHAGOGASTRODUODENOSCOPY (EGD);  Surgeon: Mylene Moeller MD;  [facility];  Service: Gastroenterology    KS INSJ/RPLCMT SPINAL NPG/RCVR POCKET CRTJ&CONNJ Left 2018    Procedure: removal of left buttock implantable pulse generator and placement of new  implantable pulse generator;  Surgeon: Wood Rogel MD;  Location: [facility];  Service: Neurosurgery     Social History     Substance and Sexual Activity   Alcohol Use Not Currently    Comment: Denied history of alcohol use     Social History     Substance and Sexual Activity   Drug Use No    Comment: Denied history of drug use     Social History     Tobacco Use   Smoking Status Former    Current packs/day: 0.00    Types: Cigarettes    Quit date: 1970    Years since quittin.8   Smokeless Tobacco Never   Tobacco Comments    Denied history of current ever day smoker, Former smoker and Never smoker all documented in [employer]     Family History: Family history non-contributory    Meds/Allergies   all current active meds have been reviewed and current meds:   Current Facility-Administered Medications:     acetaminophen (TYLENOL) tablet 650 mg, Q6H PRN    albuterol (PROVENTIL HFA,VENTOLIN HFA) inhaler 2 puff, Q6H PRN    aluminum-magnesium hydroxide-simethicone (MAALOX) oral suspension 30 mL, Q6H PRN    aspirin chewable tablet 81 mg, Daily    atorvastatin (LIPITOR) tablet 40 mg, Daily    baclofen tablet 10 mg, BID PRN    ciprofloxacin (CIPRO) tablet 500 mg, Q24H    docusate sodium (COLACE) capsule 100 mg, BID PRN    ferrous sulfate tablet 325 mg, Daily With Dinner    furosemide (LASIX) injection 60 mg, BID    heparin (porcine) subcutaneous injection  5,000 Units, Q8H ARY    insulin lispro (HumALOG/ADMELOG) 100 units/mL subcutaneous injection 1-5 Units, 4x Daily (AC & HS) **AND** Fingerstick Glucose (POCT), 4x Daily AC and at bedtime    latanoprost (XALATAN) 0.005 % ophthalmic solution 1 drop, HS    levothyroxine tablet 37.5 mcg, Early Morning    magnesium Oxide (MAG-OX) tablet 400 mg, BID    meclizine (ANTIVERT) tablet 25 mg, Daily PRN    metoprolol succinate (TOPROL-XL) 24 hr tablet 50 mg, Q12H ARY    Milnacipran HCl TABS 100 mg, Daily    ondansetron (ZOFRAN) injection 4 mg, Q6H PRN    oxyCODONE (ROXICODONE) immediate release tablet 10 mg, TID PRN    pantoprazole (PROTONIX) EC tablet 40 mg, Daily Before Breakfast    polyethylene glycol (MIRALAX) packet 17 g, Daily    senna (SENOKOT) tablet 17.2 mg, HS  Allergies   Allergen Reactions    Penicillins Anaphylaxis, Hives and Other (See Comments)     Other reaction(s): Unknown Reaction    Sulfa Antibiotics Anaphylaxis and Other (See Comments)     Other reaction(s): Unknown Reaction    Aspartame - Food Allergy Other (See Comments) and Hypertension     Slurred speech, weakness, stroke sx    Iodinated Contrast Media Hives     Pt has taken prep prior for contrast and has not had any break through reaction    Keflex [Cephalexin] Hives       Objective   Vitals: Blood pressure 121/62, pulse 82, temperature 98.6 °F (37 °C), temperature source Axillary, resp. rate 16, height 5' (1.524 m), weight 86.1 kg (189 lb 13.1 oz), SpO2 97%, not currently breastfeeding., Body mass index is 37.07 kg/m².,   Orthostatic Blood Pressures      Flowsheet Row Most Recent Value   Blood Pressure 121/62 filed at 10/12/2024 0726   Patient Position - Orthostatic VS Lying filed at 10/12/2024 0726              Intake/Output Summary (Last 24 hours) at 10/12/2024 1010  Last data filed at 10/12/2024 0601  Gross per 24 hour   Intake --   Output 3300 ml   Net -3300 ml       Invasive Devices       Peripheral Intravenous Line  Duration             Peripheral  IV 10/11/24 Right Antecubital <1 day              Drain  Duration             External Urinary Catheter <1 day                        Physical Exam:  Physical Exam  Constitutional:       General: She is not in acute distress.     Appearance: She is well-developed. She is not diaphoretic.   HENT:      Mouth/Throat:      Mouth: Oropharynx is clear and moist.   Eyes:      Extraocular Movements: EOM normal.      Conjunctiva/sclera: Conjunctivae normal.      Pupils: Pupils are equal, round, and reactive to light.   Neck:      Vascular: No JVD.   Cardiovascular:      Rate and Rhythm: Normal rate and regular rhythm.      Heart sounds: Normal heart sounds. No murmur heard.     No friction rub. No gallop.   Pulmonary:      Effort: Pulmonary effort is normal. No respiratory distress.      Breath sounds: Normal breath sounds. No wheezing.   Abdominal:      General: Bowel sounds are normal. There is no distension.      Palpations: Abdomen is soft.      Tenderness: There is no abdominal tenderness. There is no guarding.   Musculoskeletal:         General: Normal range of motion.      Right lower le+ Pitting Edema present.      Left lower le+ Pitting Edema present.   Skin:     General: Skin is warm and dry.   Neurological:      Mental Status: She is alert and oriented to person, place, and time.   Psychiatric:         Mood and Affect: Mood and affect normal.         Behavior: Behavior normal.           Lab Results:     Lab Results   Component Value Date    CKTOTAL 50 2024    TROPONINI 0.04 2020    TROPONINI 0.06 (H) 07/15/2020       Lab Results   Component Value Date    GLUCOSE 193 (H) 2018    CALCIUM 9.1 10/12/2024     2015    K 4.2 10/12/2024    CO2 30 10/12/2024    CL 99 10/12/2024    BUN 33 (H) 10/12/2024    CREATININE 1.50 (H) 10/12/2024       Lab Results   Component Value Date    WBC 8.56 10/12/2024    HGB 10.8 (L) 10/12/2024    HCT 33.4 (L) 10/12/2024    MCV 90 10/12/2024      10/12/2024       Lab Results   Component Value Date    CHOL 165 04/30/2015    CHOL 208 03/20/2014     Lab Results   Component Value Date    HDL 42 (L) 11/26/2023    HDL 50 11/11/2023    HDL 48 (L) 06/09/2023     Lab Results   Component Value Date    LDLCALC 42 11/26/2023    LDLCALC 62 11/11/2023    LDLCALC 51 06/09/2023     Lab Results   Component Value Date    TRIG 179 (H) 11/26/2023    TRIG 84 11/11/2023    TRIG 166 (H) 06/09/2023       Lab Results   Component Value Date    ALT 7 10/11/2024    AST 17 10/11/2024    ALKPHOS 123 (H) 10/11/2024               Imaging: Results Review Statement: I reviewed radiology reports from this admission including: chest xray, CT chest, and CT abdomen/pelvis.    EKG: NSR, no acute ST changes

## 2024-10-12 NOTE — PLAN OF CARE
Problem: Potential for Falls  Goal: Patient will remain free of falls  Description: INTERVENTIONS:  - Educate patient/family on patient safety including physical limitations  - Instruct patient to call for assistance with activity   - Consult OT/PT to assist with strengthening/mobility   - Keep Call bell within reach  - Keep bed low and locked with side rails adjusted as appropriate  - Keep care items and personal belongings within reach  - Initiate and maintain comfort rounds  - Make Fall Risk Sign visible to staff  - Apply yellow socks and bracelet for high fall risk patients  - Consider moving patient to room near nurses station  Outcome: Progressing     Problem: PAIN - ADULT  Goal: Verbalizes/displays adequate comfort level or baseline comfort level  Description: Interventions:  - Encourage patient to monitor pain and request assistance  - Assess pain using appropriate pain scale  - Administer analgesics based on type and severity of pain and evaluate response  - Implement non-pharmacological measures as appropriate and evaluate response  - Consider cultural and social influences on pain and pain management  - Notify physician/advanced practitioner if interventions unsuccessful or patient reports new pain  Outcome: Progressing     Problem: INFECTION - ADULT  Goal: Absence or prevention of progression during hospitalization  Description: INTERVENTIONS:  - Assess and monitor for signs and symptoms of infection  - Monitor lab/diagnostic results  - Monitor all insertion sites, i.e. indwelling lines, tubes, and drains  - Monitor endotracheal if appropriate and nasal secretions for changes in amount and color  - [place] appropriate cooling/warming therapies per order  - Administer medications as ordered  - Instruct and encourage patient and family to use good hand hygiene technique  - Identify and instruct in appropriate isolation precautions for identified infection/condition  Outcome: Progressing     Problem:  SAFETY ADULT  Goal: Patient will remain free of falls  Description: INTERVENTIONS:  - Educate patient/family on patient safety including physical limitations  - Instruct patient to call for assistance with activity   - Consult OT/PT to assist with strengthening/mobility   - Keep Call bell within reach  - Keep bed low and locked with side rails adjusted as appropriate  - Keep care items and personal belongings within reach  - Initiate and maintain comfort rounds  - Make Fall Risk Sign visible to staff  - Apply yellow socks and bracelet for high fall risk patients  - Consider moving patient to room near nurses station  Outcome: Progressing  Goal: Maintain or return to baseline ADL function  Description: INTERVENTIONS:  -  Assess patient's ability to carry out ADLs; assess patient's baseline for ADL function and identify physical deficits which impact ability to perform ADLs (bathing, care of mouth/teeth, toileting, grooming, dressing, etc.)  - Assess/evaluate cause of self-care deficits   - Assess range of motion  - Assess patient's mobility; develop plan if impaired  - Assess patient's need for assistive devices and provide as appropriate  - Encourage maximum independence but intervene and supervise when necessary  - Involve family in performance of ADLs  - Assess for home care needs following discharge   - Consider OT consult to assist with ADL evaluation and planning for discharge  - Provide patient education as appropriate  Outcome: Progressing  Goal: Maintains/Returns to pre admission functional level  Description: INTERVENTIONS:  - Perform AM-PAC 6 Click Basic Mobility/ Daily Activity assessment daily.  - Set and communicate daily mobility goal to care team and patient/family/caregiver.   - Collaborate with rehabilitation services on mobility goals if consulted    - Record patient progress and toleration of activity level   Outcome: Progressing

## 2024-10-12 NOTE — ASSESSMENT & PLAN NOTE
Lab Results   Component Value Date    HGBA1C 7.4 (H) 10/12/2024     Recent Labs     10/11/24  2059 10/12/24  0729 10/12/24  1112   POCGLU 124 135 244*   Carb controlled diet  Sliding scale insulin  Noted spike - may require long acting insulin but will continue to monitor

## 2024-10-12 NOTE — PROGRESS NOTES
Progress Note - Hospitalist   Name: Mattie Varela 79 y.o. female I MRN: [id number]  Unit/Bed#: E5 -01 I Date of Admission: 10/11/2024   Date of Service: 10/12/2024 I Hospital Day: 1    Assessment & Plan  Acute on chronic diastolic congestive heart failure (HCC)  Wt Readings from Last 3 Encounters:   10/12/24 86.1 kg (189 lb 13.1 oz)   10/02/24 85.5 kg (188 lb 9.6 oz)   09/26/24 86.2 kg (190 lb)   Patient has had a significant weight gain since most recent hospitalization as well as cardiology follow-up  She has evidence of bilateral pitting lower extremity edema on admission  Home regimen was previously lasix but now she is on torsemide 30 mg daily (started 9/26/24)  Started on IV Lasix 60 mg IV twice daily here  Daily weights/ins and outs- down about 3.3 L since arrival   Recent echo with preserved EF  Cardiology following  SADAF (acute kidney injury) ([facility])  Results from last 7 days   Lab Units 10/12/24  0535 10/11/24  1228   CREATININE mg/dL 1.50* 1.65*   Baseline around 1.1-1.3  Suspect cardiorenal in setting of heart failure  Monitor BMP with diuretics  Consider nephrology evaluation if Cr worsens  Degenerative lumbar spinal stenosis  Status post spinal stimulator utilizes baclofen as needed and Percocet 10 mg 3 times daily as needed  Type 2 diabetes mellitus with other skin complications ([facility])  Lab Results   Component Value Date    HGBA1C 7.4 (H) 10/12/2024     Recent Labs     10/11/24  2059 10/12/24  0729 10/12/24  1112   POCGLU 124 135 244*   Carb controlled diet  Sliding scale insulin  Noted spike - may require long acting insulin but will continue to monitor  Chronic pain disorder  Reviewed PDMP as well as facility paperwork  Continue home regiment  Hypertension  Continue metoprolol succinate twice daily  Hypothyroidism  Continue Synthroid  Acute cystitis  Patient was diagnosed with acute cystitis as an outpatient was started on ciprofloxacin 7-day course through 10/15/2024  Continue renal adjusted  dose through 10/15/2024  Overactive bladder  Received Botox injections in the past  Urinary retention protocol  Cerebrovascular accident (CVA), unspecified mechanism (HCC)  Continue aspirin, statin  PT/OT  Chronic obstructive pulmonary disease, unspecified COPD type (HCC)  Not in acute exacerbation  Continue albuterol as needed  Chest pain  EKG normal sinus rhythm without ischemic changes  Troponin unremarkable  Suspect related to heart failure, component of anxiety  Monitor telemetry    VTE Pharmacologic Prophylaxis: VTE Score: 6 heparin    Mobility:   Basic Mobility Inpatient Raw Score: 12  -[facility] Goal: 4: Move to chair/commode  -[facility] Achieved: 4: Move to chair/commode      Patient Centered Rounds: I performed bedside rounds with nursing staff today.   Discussions with Specialists or Other Care Team Provider: nursing    Education and Discussions with Family / Patient: patient    Current Length of Stay: 1 day(s)  Current Patient Status: Inpatient   Certification Statement: Fever continued inpatient stay for ongoing IV diuresis in setting of acute CHF duration    Code Status: Level 1 - Full Code    Subjective   Resting in bed. Reports legs are slowly improving.  Currently resides at [facility].  Reports she is having some low back pain and it is hard to get comfortable in this bed.    Objective :  Temp:  [97.9 °F (36.6 °C)-98.6 °F (37 °C)] 98.6 °F (37 °C)  HR:  [78-92] 82  BP: (112-165)/(60-83) 121/62  Resp:  [16-20] 16  SpO2:  [90 %-98 %] 97 %  O2 Device: None (Room air)    Body mass index is 37.07 kg/m².     Input and Output Summary (last 24 hours):     Intake/Output Summary (Last 24 hours) at 10/12/2024 1306  Last data filed at 10/12/2024 0601  Gross per 24 hour   Intake --   Output 3300 ml   Net -3300 ml       Physical Exam  Vitals reviewed.   Constitutional:       General: She is not in acute distress.     Appearance: She is obese. She is not ill-appearing, toxic-appearing or diaphoretic.   HENT:       Head: Normocephalic and atraumatic.   Eyes:      General: No scleral icterus.  Cardiovascular:      Rate and Rhythm: Normal rate and regular rhythm.   Pulmonary:      Effort: Pulmonary effort is normal. No respiratory distress.      Breath sounds: Normal breath sounds. No stridor. No wheezing or rhonchi.   Abdominal:      General: Bowel sounds are normal. There is no distension.      Palpations: Abdomen is soft. There is no mass.      Tenderness: There is no abdominal tenderness.      Hernia: No hernia is present.   Musculoskeletal:         General: Swelling present.      Cervical back: Neck supple.      Right lower leg: Edema present.      Left lower leg: Edema present.   Skin:     General: Skin is warm and dry.   Neurological:      Mental Status: She is alert and oriented to person, place, and time. Mental status is at baseline.   Psychiatric:         Mood and Affect: Mood normal.         Behavior: Behavior normal.     Lines/Drains:  Lines/Drains/Airways       Active Status       Name Placement date Placement time Site Days    External Urinary Catheter 10/11/24  2300  -- less than 1                      Telemetry:  Telemetry Orders (From admission, onward)               24 Hour Telemetry Monitoring  Continuous x 24 Hours (Telem)        Expiring   Question:  Reason for 24 Hour Telemetry  Answer:  Decompensated CHF- and any one of the following: continuous diuretic infusion or total diuretic dose >200 mg daily, associated electrolyte derangement (I.e. K < 3.0), ionotropic drip (continuous infusion), hx of ventricular arrhythmia, or new EF < 35%                           Lab Results: I have reviewed the following results:   Results from last 7 days   Lab Units 10/12/24  0535 10/11/24  1228   WBC Thousand/uL 8.56 7.76   HEMOGLOBIN g/dL 10.8* 10.8*   HEMATOCRIT % 33.4* 34.5*   PLATELETS Thousands/uL 265 264   SEGS PCT %  --  51   LYMPHO PCT %  --  25   MONO PCT %  --  9   EOS PCT %  --  14*     Results from last 7 days    Lab Units 10/12/24  0535 10/11/24  1228   SODIUM mmol/L 138 136   POTASSIUM mmol/L 4.2 5.0   CHLORIDE mmol/L 99 99   CO2 mmol/L 30 30   BUN mg/dL 33* 29*   CREATININE mg/dL 1.50* 1.65*   ANION GAP mmol/L 9 7   CALCIUM mg/dL 9.1 9.4   ALBUMIN g/dL  --  3.8   TOTAL BILIRUBIN mg/dL  --  0.34   ALK PHOS U/L  --  123*   ALT U/L  --  7   AST U/L  --  17   GLUCOSE RANDOM mg/dL 127 128         Results from last 7 days   Lab Units 10/12/24  1112 10/12/24  0729 10/11/24  2059   POC GLUCOSE mg/dl 244* 135 124     Results from last 7 days   Lab Units 10/12/24  0535   HEMOGLOBIN A1C % 7.4*           Recent Cultures (last 7 days):               Last 24 Hours Medication List:     Current Facility-Administered Medications:     acetaminophen (TYLENOL) tablet 650 mg, Q6H PRN    albuterol (PROVENTIL HFA,VENTOLIN HFA) inhaler 2 puff, Q6H PRN    aluminum-magnesium hydroxide-simethicone (MAALOX) oral suspension 30 mL, Q6H PRN    aspirin chewable tablet 81 mg, Daily    atorvastatin (LIPITOR) tablet 40 mg, Daily    baclofen tablet 10 mg, BID PRN    ciprofloxacin (CIPRO) tablet 500 mg, Q24H    docusate sodium (COLACE) capsule 100 mg, BID PRN    ferrous sulfate tablet 325 mg, Daily With Dinner    furosemide (LASIX) injection 60 mg, BID    heparin (porcine) subcutaneous injection 5,000 Units, Q8H ARY    insulin lispro (HumALOG/ADMELOG) 100 units/mL subcutaneous injection 1-5 Units, 4x Daily (AC & HS) **AND** Fingerstick Glucose (POCT), 4x Daily AC and at bedtime    latanoprost (XALATAN) 0.005 % ophthalmic solution 1 drop, HS    levothyroxine tablet 37.5 mcg, Early Morning    magnesium Oxide (MAG-OX) tablet 400 mg, BID    meclizine (ANTIVERT) tablet 25 mg, Daily PRN    metoprolol succinate (TOPROL-XL) 24 hr tablet 50 mg, Q12H ARY    Milnacipran HCl TABS 100 mg, Daily    ondansetron (ZOFRAN) injection 4 mg, Q6H PRN    oxyCODONE (ROXICODONE) immediate release tablet 10 mg, TID PRN    pantoprazole (PROTONIX) EC tablet 40 mg, Daily Before  Breakfast    polyethylene glycol (MIRALAX) packet 17 g, Daily    senna (SENOKOT) tablet 17.2 mg, HS    Administrative Statements   Today, Patient Was Seen By: Susannah Mcclain PA-C      **Please Note: This note may have been constructed using a voice recognition system.**

## 2024-10-12 NOTE — CASE MANAGEMENT
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     Generalized edema 2024     Constipation 2024     Leg pain, right 2024     Acute on chronic diastolic congestive heart failure (HCC) 2024     s/p [employer] loop recorder 3/2/2024 2024     Moderate protein-calorie malnutrition (HCC) 2024     Hypertensive urgency 2024     AMS (altered mental status) 2024     Encounter for examination for admission to nursing home 2024     Stage 3a chronic kidney disease ([facility]) 01/10/2024     Left ventricular outflow obstruction 2024     Obesity, Class I, BMI 30-34.9 2024     Urinary retention 2023     SADAF (acute kidney injury) ([facility]) 2023     Exertional shortness of breath 2023     Non obstructive CAD 11/15/2023     History of lumbar surgery 11/10/2023     Abnormal urinalysis 2023     Chronic obstructive pulmonary disease, unspecified COPD type ([facility]) 2023     Confusion with body shakes and blank stare 2023     Anemia in stage 3a chronic kidney disease  (HCC) 2023     Lower extremity edema 2023     Cerebrovascular accident (CVA), unspecified mechanism ([facility]) 2022     Stroke-like symptoms 2022     Prepyloric ulcer 2021     Diarrhea 2021     Mild intermittent asthma without complication 2020     S/P insertion of spinal cord stimulator 2018     Iron deficiency anemia secondary to inadequate dietary iron intake 2018     Type 2 diabetes mellitus with other skin complications  (HCC)      Failed back surgical syndrome 03/16/2018     Hypothyroidism 11/20/2017     Degenerative lumbar spinal stenosis 01/25/2017     Glaucoma 01/06/2017     Overactive bladder 08/04/2016     Dyspepsia 02/09/2016     Hypercholesterolemia 02/09/2016     Anxiety 02/09/2015     Chronic pain disorder 12/18/2013     Hypertension 06/12/2013       LOS (days): 1  Geometric Mean LOS (GMLOS) (days):   Days to GMLOS:     OBJECTIVE:    Risk of Unplanned Readmission Score: 31.08         Current admission status: Inpatient       Preferred Pharmacy:   [facility] [place], [place] - [address]  [facility] [address]  Phone: [phone] Fax: [phone]    [facility] [place], [place] - [address]  [facility],  [address]  [facility],  First Floor [facility] [address]  Phone: [phone] Fax: [phone]    [facility] - [place], [place] - [address]  [address]  [place] [address]  Phone: [phone] Fax: [phone]    Primary Care Provider: NANY Pollock    Primary Insurance: [facility]  Secondary Insurance:     ASSESSMENT:  Active Health Care Proxies       [facility] Representative - Daughter   Primary Phone: [phone] (Mobile)  Home Phone: [phone]                           Readmission Root Cause  30 Day Readmission: No    Patient Information  Admitted from:: Home  Mental Status: Alert  During Assessment patient was accompanied by: Not accompanied during assessment  Assessment information provided by:: Patient  Primary Caregiver: Self  Support Systems: Self, Daughter, Children  County of Residence: [place]  What city do you live in?: [place]  Home entry access options. Select all that apply.: Elevator  Type of Current Residence: Apartment  Floor Level: 2  Upon entering residence, is there a bedroom on the main floor (no further steps)?: No  A bedroom is located on the following floor levels of residence  (select all that apply):: 2nd Floor  Living Arrangements: Lives in Facility    Activities of Daily Living Prior to Admission  Functional Status: Independent  Completes ADLs independently?: Yes  Ambulates independently?: Yes  Does patient use assisted devices?: Yes  Assisted Devices (DME) used: Walker  Does patient currently own DME?: Yes  Does patient have a history of Outpatient Therapy (PT/OT)?: No  Does the patient have a history of Short-Term Rehab?: Yes  Does patient have a history of HHC?: Yes  Does patient currently have HHC?: Yes    Current Home Health Care  Type of Current Home Care Services: Other (Comment)    Patient Information Continued  Income Source: Pension/[facility]  Does patient have a history of Mental Health Diagnosis?: No                    DISCHARGE DETAILS:    Discharge planning discussed with:: Chart reviewed. Cm met with pt, introduced self, role and discharged process. Pt lives at [facility], indep with ADLS PTA. Pt has hx of inpt rehab and HHC services. Potential discharged home pending recommendation. Pt is current with [facility]. Cm will continue to follow up and assist with discharged planning needs.  Freedom of Choice: Yes     CM contacted family/caregiver?: Yes  Were Treatment Team discharge recommendations reviewed with patient/caregiver?: Yes  Did patient/caregiver verbalize understanding of patient care needs?: Yes       Contacts  Reason/Outcome: Discharge Planning

## 2024-10-12 NOTE — ASSESSMENT & PLAN NOTE
Results from last 7 days   Lab Units 10/12/24  0535 10/11/24  1228   CREATININE mg/dL 1.50* 1.65*   Baseline around 1.1-1.3  Suspect cardiorenal in setting of heart failure  Monitor BMP with diuretics  Consider nephrology evaluation if Cr worsens

## 2024-10-12 NOTE — CONSULTS
Consult received and appreciated. Please refer to nutrition flowsheets for assessment details.     Patient with decreased appetite in the hospital setting. Encouraged PO intake and discussed ways to increase PO intake. No nutrition supplements at this time.     Michell Spear MS, RD, LDN

## 2024-10-12 NOTE — UTILIZATION REVIEW
NOTIFICATION OF INPATIENT ADMISSION   AUTHORIZATION REQUEST   SERVICING FACILITY:   [place] [address]  Tax ID: [id number]  NPI: [id number] ATTENDING PROVIDER:  Attending Name and NPI#: Royer Carballo Do [[id number]]  Address: [address] [place] [address]  Phone: [phone]     ADMISSION INFORMATION:  Place of Service: Inpatient [facility]  Place of Service Code: 21  Inpatient Admission Date/Time: 10/11/24  4:46 PM  Discharge Date/Time: No discharge date for patient encounter.  Admitting Diagnosis Code/Description:  Chest pain [R07.9]  CHF exacerbation (HCC) [I50.9]  SADAF (acute kidney injury) (HCC) [N17.9]     UTILIZATION REVIEW CONTACT:  Lorraine Carter, Utilization   Network Utilization Review Department  Phone: [phone]  Fax [phone]  Email: [email or web address]  Contact for approvals/pending authorizations, clinical reviews, and discharge.     PHYSICIAN ADVISORY SERVICES:  Medical Necessity Denial & Cnky-qs-Aurj Review  Phone: [phone]  Fax: [phone]  Email: [email or web address]     DISCHARGE SUPPORT TEAM:  For Patients Discharge Needs & Updates  Phone: [phone] opt. 2 Fax: [phone]  Email: [email or web address]

## 2024-10-12 NOTE — ASSESSMENT & PLAN NOTE
Wt Readings from Last 3 Encounters:   10/12/24 86.1 kg (189 lb 13.1 oz)   10/02/24 85.5 kg (188 lb 9.6 oz)   09/26/24 86.2 kg (190 lb)   Patient has had a significant weight gain since most recent hospitalization as well as cardiology follow-up  She has evidence of bilateral pitting lower extremity edema on admission  Home regimen was previously lasix but now she is on torsemide 30 mg daily (started 9/26/24)  Started on IV Lasix 60 mg IV twice daily here  Daily weights/ins and outs- down about 3.3 L since arrival   Recent echo with preserved EF  Cardiology following

## 2024-10-12 NOTE — PLAN OF CARE
Problem: Potential for Falls  Goal: Patient will remain free of falls  Description: INTERVENTIONS:  - Educate patient/family on patient safety including physical limitations  - Instruct patient to call for assistance with activity   - Consult OT/PT to assist with strengthening/mobility   - Keep Call bell within reach  - Keep bed low and locked with side rails adjusted as appropriate  - Keep care items and personal belongings within reach  - Initiate and maintain comfort rounds  - Make Fall Risk Sign visible to staff  - Apply yellow socks and bracelet for high fall risk patients  - Consider moving patient to room near nurses station  Outcome: Progressing     Problem: PAIN - ADULT  Goal: Verbalizes/displays adequate comfort level or baseline comfort level  Description: Interventions:  - Encourage patient to monitor pain and request assistance  - Assess pain using appropriate pain scale  - Administer analgesics based on type and severity of pain and evaluate response  - Implement non-pharmacological measures as appropriate and evaluate response  - Consider cultural and social influences on pain and pain management  - Notify physician/advanced practitioner if interventions unsuccessful or patient reports new pain  Outcome: Progressing     Problem: INFECTION - ADULT  Goal: Absence or prevention of progression during hospitalization  Description: INTERVENTIONS:  - Assess and monitor for signs and symptoms of infection  - Monitor lab/diagnostic results  - Monitor all insertion sites, i.e. indwelling lines, tubes, and drains  - Monitor endotracheal if appropriate and nasal secretions for changes in amount and color  - [place] appropriate cooling/warming therapies per order  - Administer medications as ordered  - Instruct and encourage patient and family to use good hand hygiene technique  - Identify and instruct in appropriate isolation precautions for identified infection/condition  Outcome: Progressing  Goal: Absence  of fever/infection during neutropenic period  Description: INTERVENTIONS:  - Monitor WBC    Outcome: Progressing     Problem: SAFETY ADULT  Goal: Patient will remain free of falls  Description: INTERVENTIONS:  - Educate patient/family on patient safety including physical limitations  - Instruct patient to call for assistance with activity   - Consult OT/PT to assist with strengthening/mobility   - Keep Call bell within reach  - Keep bed low and locked with side rails adjusted as appropriate  - Keep care items and personal belongings within reach  - Initiate and maintain comfort rounds  - Make Fall Risk Sign visible to staff  - Apply yellow socks and bracelet for high fall risk patients  - Consider moving patient to room near nurses station  Outcome: Progressing  Goal: Maintain or return to baseline ADL function  Description: INTERVENTIONS:  -  Assess patient's ability to carry out ADLs; assess patient's baseline for ADL function and identify physical deficits which impact ability to perform ADLs (bathing, care of mouth/teeth, toileting, grooming, dressing, etc.)  - Assess/evaluate cause of self-care deficits   - Assess range of motion  - Assess patient's mobility; develop plan if impaired  - Assess patient's need for assistive devices and provide as appropriate  - Encourage maximum independence but intervene and supervise when necessary  - Involve family in performance of ADLs  - Assess for home care needs following discharge   - Consider OT consult to assist with ADL evaluation and planning for discharge  - Provide patient education as appropriate  Outcome: Progressing  Goal: Maintains/Returns to pre admission functional level  Description: INTERVENTIONS:  - Perform AM-PAC 6 Click Basic Mobility/ Daily Activity assessment daily.  - Set and communicate daily mobility goal to care team and patient/family/caregiver.   - Collaborate with rehabilitation services on mobility goals if consulted  - Out of bed for  toileting  - Record patient progress and toleration of activity level   Outcome: Progressing     Problem: DISCHARGE PLANNING  Goal: Discharge to home or other facility with appropriate resources  Description: INTERVENTIONS:  - Identify barriers to discharge w/patient and caregiver  - Arrange for needed discharge resources and transportation as appropriate  - Identify discharge learning needs (meds, wound care, etc.)  - Arrange for interpretive services to assist at discharge as needed  - Refer to Case Management Department for coordinating discharge planning if the patient needs post-hospital services based on physician/advanced practitioner order or complex needs related to functional status, cognitive ability, or social support system  Outcome: Progressing     Problem: Knowledge Deficit  Goal: Patient/family/caregiver demonstrates understanding of disease process, treatment plan, medications, and discharge instructions  Description: Complete learning assessment and assess knowledge base.  Interventions:  - Provide teaching at level of understanding  - Provide teaching via preferred learning methods  Outcome: Progressing     Problem: Prexisting or High Potential for Compromised Skin Integrity  Goal: Skin integrity is maintained or improved  Description: INTERVENTIONS:  - Identify patients at risk for skin breakdown  - Assess and monitor skin integrity  - Assess and monitor nutrition and hydration status  - Monitor labs   - Assess for incontinence   - Turn and reposition patient  - Assist with mobility/ambulation  - Relieve pressure over bony prominences  - Avoid friction and shearing  - Provide appropriate hygiene as needed including keeping skin clean and dry  - Evaluate need for skin moisturizer/barrier cream  - Collaborate with interdisciplinary team   - Patient/family teaching  - Consider wound care consult   Outcome: Progressing     Problem: Nutrition/Hydration-ADULT  Goal: Nutrient/Hydration intake  appropriate for improving, restoring or maintaining nutritional needs  Description: Monitor and assess patient's nutrition/hydration status for malnutrition. Collaborate with interdisciplinary team and initiate plan and interventions as ordered.  Monitor patient's weight and dietary intake as ordered or per policy. Utilize nutrition screening tool and intervene as necessary. Determine patient's food preferences and provide high-protein, high-caloric foods as appropriate.     INTERVENTIONS:  - Monitor oral intake, urinary output, labs, and treatment plans  - Assess nutrition and hydration status and recommend course of action  - Evaluate amount of meals eaten  - Assist patient with eating if necessary   - Allow adequate time for meals  - Recommend/ encourage appropriate diets, oral nutritional supplements, and vitamin/mineral supplements  - Order, calculate, and assess calorie counts as needed  - Recommend, monitor, and adjust tube feedings and TPN/PPN based on assessed needs  - Assess need for intravenous fluids  - Provide specific nutrition/hydration education as appropriate  - Include patient/family/caregiver in decisions related to nutrition  Outcome: Progressing

## 2024-10-13 ENCOUNTER — APPOINTMENT (INPATIENT)
Dept: CT IMAGING | Facility: HOSPITAL | Age: 79
DRG: 871 | End: 2024-10-13
Payer: MEDICARE

## 2024-10-13 PROBLEM — M54.50 ACUTE LOW BACK PAIN: Status: ACTIVE | Noted: 2024-10-13

## 2024-10-13 LAB
ANION GAP SERPL CALCULATED.3IONS-SCNC: 8 MMOL/L (ref 4–13)
BUN SERPL-MCNC: 28 MG/DL (ref 5–25)
CALCIUM SERPL-MCNC: 9.5 MG/DL (ref 8.4–10.2)
CHLORIDE SERPL-SCNC: 95 MMOL/L (ref 96–108)
CO2 SERPL-SCNC: 35 MMOL/L (ref 21–32)
CREAT SERPL-MCNC: 1.43 MG/DL (ref 0.6–1.3)
ERYTHROCYTE [DISTWIDTH] IN BLOOD BY AUTOMATED COUNT: 13.3 % (ref 11.6–15.1)
GFR SERPL CREATININE-BSD FRML MDRD: 34 ML/MIN/1.73SQ M
GLUCOSE SERPL-MCNC: 148 MG/DL (ref 65–140)
GLUCOSE SERPL-MCNC: 157 MG/DL (ref 65–140)
GLUCOSE SERPL-MCNC: 171 MG/DL (ref 65–140)
GLUCOSE SERPL-MCNC: 198 MG/DL (ref 65–140)
GLUCOSE SERPL-MCNC: 213 MG/DL (ref 65–140)
HCT VFR BLD AUTO: 36.9 % (ref 34.8–46.1)
HGB BLD-MCNC: 11.9 G/DL (ref 11.5–15.4)
MCH RBC QN AUTO: 29.5 PG (ref 26.8–34.3)
MCHC RBC AUTO-ENTMCNC: 32.2 G/DL (ref 31.4–37.4)
MCV RBC AUTO: 91 FL (ref 82–98)
PLATELET # BLD AUTO: 267 THOUSANDS/UL (ref 149–390)
PMV BLD AUTO: 10.3 FL (ref 8.9–12.7)
POTASSIUM SERPL-SCNC: 4.5 MMOL/L (ref 3.5–5.3)
RBC # BLD AUTO: 4.04 MILLION/UL (ref 3.81–5.12)
SODIUM SERPL-SCNC: 138 MMOL/L (ref 135–147)
WBC # BLD AUTO: 8.28 THOUSAND/UL (ref 4.31–10.16)

## 2024-10-13 PROCEDURE — 80048 BASIC METABOLIC PNL TOTAL CA: CPT | Performed by: PHYSICIAN ASSISTANT

## 2024-10-13 PROCEDURE — 99232 SBSQ HOSP IP/OBS MODERATE 35: CPT | Performed by: PHYSICIAN ASSISTANT

## 2024-10-13 PROCEDURE — 82948 REAGENT STRIP/BLOOD GLUCOSE: CPT

## 2024-10-13 PROCEDURE — 97163 PT EVAL HIGH COMPLEX 45 MIN: CPT

## 2024-10-13 PROCEDURE — 85027 COMPLETE CBC AUTOMATED: CPT | Performed by: PHYSICIAN ASSISTANT

## 2024-10-13 PROCEDURE — 99232 SBSQ HOSP IP/OBS MODERATE 35: CPT | Performed by: STUDENT IN AN ORGANIZED HEALTH CARE EDUCATION/TRAINING PROGRAM

## 2024-10-13 PROCEDURE — 97166 OT EVAL MOD COMPLEX 45 MIN: CPT

## 2024-10-13 PROCEDURE — 72131 CT LUMBAR SPINE W/O DYE: CPT

## 2024-10-13 RX ORDER — OXYCODONE HYDROCHLORIDE 10 MG/1
10 TABLET ORAL 3 TIMES DAILY PRN
Status: DISCONTINUED | OUTPATIENT
Start: 2024-10-13 | End: 2024-10-16

## 2024-10-13 RX ORDER — LANOLIN ALCOHOL/MO/W.PET/CERES
6 CREAM (GRAM) TOPICAL
Status: DISCONTINUED | OUTPATIENT
Start: 2024-10-13 | End: 2024-10-19 | Stop reason: HOSPADM

## 2024-10-13 RX ORDER — ZOLPIDEM TARTRATE 5 MG/1
5 TABLET ORAL
Status: DISCONTINUED | OUTPATIENT
Start: 2024-10-13 | End: 2024-10-19 | Stop reason: HOSPADM

## 2024-10-13 RX ORDER — AMOXICILLIN 250 MG
1 CAPSULE ORAL 2 TIMES DAILY
Status: DISCONTINUED | OUTPATIENT
Start: 2024-10-13 | End: 2024-10-19 | Stop reason: HOSPADM

## 2024-10-13 RX ORDER — LIDOCAINE 50 MG/G
1 PATCH TOPICAL DAILY
Status: DISCONTINUED | OUTPATIENT
Start: 2024-10-13 | End: 2024-10-19 | Stop reason: HOSPADM

## 2024-10-13 RX ADMIN — Medication 400 MG: at 08:03

## 2024-10-13 RX ADMIN — ZOLPIDEM TARTRATE 5 MG: 5 TABLET, COATED ORAL at 21:00

## 2024-10-13 RX ADMIN — FUROSEMIDE 60 MG: 10 INJECTION, SOLUTION INTRAVENOUS at 08:03

## 2024-10-13 RX ADMIN — SENNOSIDES AND DOCUSATE SODIUM 1 TABLET: 8.6; 5 TABLET ORAL at 08:03

## 2024-10-13 RX ADMIN — ASPIRIN 81 MG CHEWABLE TABLET 81 MG: 81 TABLET CHEWABLE at 08:03

## 2024-10-13 RX ADMIN — ATORVASTATIN CALCIUM 40 MG: 40 TABLET, FILM COATED ORAL at 08:03

## 2024-10-13 RX ADMIN — PANTOPRAZOLE SODIUM 40 MG: 40 TABLET, DELAYED RELEASE ORAL at 05:23

## 2024-10-13 RX ADMIN — Medication 400 MG: at 20:58

## 2024-10-13 RX ADMIN — FUROSEMIDE 60 MG: 10 INJECTION, SOLUTION INTRAVENOUS at 17:59

## 2024-10-13 RX ADMIN — FERROUS SULFATE TAB 325 MG (65 MG ELEMENTAL FE) 325 MG: 325 (65 FE) TAB at 16:57

## 2024-10-13 RX ADMIN — METOPROLOL SUCCINATE 50 MG: 50 TABLET, EXTENDED RELEASE ORAL at 21:02

## 2024-10-13 RX ADMIN — HEPARIN SODIUM 5000 UNITS: 5000 INJECTION INTRAVENOUS; SUBCUTANEOUS at 05:23

## 2024-10-13 RX ADMIN — CIPROFLOXACIN 500 MG: 500 TABLET ORAL at 12:01

## 2024-10-13 RX ADMIN — INSULIN LISPRO 1 UNITS: 100 INJECTION, SOLUTION INTRAVENOUS; SUBCUTANEOUS at 11:59

## 2024-10-13 RX ADMIN — MORPHINE SULFATE 2 MG: 2 INJECTION, SOLUTION INTRAMUSCULAR; INTRAVENOUS at 18:05

## 2024-10-13 RX ADMIN — BACLOFEN 10 MG: 10 TABLET ORAL at 08:03

## 2024-10-13 RX ADMIN — POLYETHYLENE GLYCOL 3350 17 G: 17 POWDER, FOR SOLUTION ORAL at 08:03

## 2024-10-13 RX ADMIN — HEPARIN SODIUM 5000 UNITS: 5000 INJECTION INTRAVENOUS; SUBCUTANEOUS at 14:18

## 2024-10-13 RX ADMIN — LEVOTHYROXINE SODIUM 37.5 MCG: 75 TABLET ORAL at 05:23

## 2024-10-13 RX ADMIN — LIDOCAINE 1 PATCH: 700 PATCH TOPICAL at 08:03

## 2024-10-13 RX ADMIN — ACETAMINOPHEN 650 MG: 325 TABLET, FILM COATED ORAL at 14:18

## 2024-10-13 RX ADMIN — INSULIN LISPRO 1 UNITS: 100 INJECTION, SOLUTION INTRAVENOUS; SUBCUTANEOUS at 08:03

## 2024-10-13 RX ADMIN — ACETAMINOPHEN 650 MG: 325 TABLET, FILM COATED ORAL at 06:12

## 2024-10-13 RX ADMIN — OXYCODONE HYDROCHLORIDE 10 MG: 10 TABLET ORAL at 10:32

## 2024-10-13 RX ADMIN — SENNOSIDES AND DOCUSATE SODIUM 1 TABLET: 8.6; 5 TABLET ORAL at 17:59

## 2024-10-13 RX ADMIN — HEPARIN SODIUM 5000 UNITS: 5000 INJECTION INTRAVENOUS; SUBCUTANEOUS at 21:00

## 2024-10-13 RX ADMIN — METOPROLOL SUCCINATE 50 MG: 50 TABLET, EXTENDED RELEASE ORAL at 08:03

## 2024-10-13 RX ADMIN — LATANOPROST 1 DROP: 50 SOLUTION OPHTHALMIC at 21:05

## 2024-10-13 RX ADMIN — INSULIN LISPRO 1 UNITS: 100 INJECTION, SOLUTION INTRAVENOUS; SUBCUTANEOUS at 21:02

## 2024-10-13 RX ADMIN — OXYCODONE HYDROCHLORIDE 10 MG: 10 TABLET ORAL at 03:34

## 2024-10-13 NOTE — ASSESSMENT & PLAN NOTE
Results from last 7 days   Lab Units 10/13/24  0554 10/12/24  0535 10/11/24  1228   CREATININE mg/dL 1.43* 1.50* 1.65*   Baseline around 1.1-1.3  Suspect cardiorenal in setting of heart failure  Monitor BMP with diuretics  Consider nephrology evaluation if Cr worsens

## 2024-10-13 NOTE — QUICK NOTE
Updated daughter via telephone.     She expressed concerns about her moms low back pain in the setting of known degenerative lumbar spinal stenosis with hx of prior laminectomy  We discussed her CT lumbar spine results without clear cause of increased pain.  Spinal stimulator that is in place no longer works but was left in place due to surrounding scar tissue. Question if this could be the source of pain.  Her current outpatient regimen for pain control is percocet  TID PRN and baclofen.   Will provide IV morphine for severe pain at this time.     Daughter questioning if botox injection about 1 week ago by urology could be contributing however suspect this is unlikely. Of note, she was also started on oral cipro for suspected UTI and is currently completing course. Has an additional 3 days left to complete 7 day course.     Lastly, she is requesting sleep aid as melatonin does not seem to help her mom. She has had ambien in the past and this worked well for her.

## 2024-10-13 NOTE — PLAN OF CARE
Problem: OCCUPATIONAL THERAPY ADULT  Goal: Performs self-care activities at highest level of function for planned discharge setting.  See evaluation for individualized goals.  Description: Treatment Interventions: ADL retraining, Functional transfer training, UE strengthening/ROM, Endurance training, Patient/family training, Neuromuscular reeducation, Equipment evaluation/education, Compensatory technique education, Activityengagement, Energy conservation          See flowsheet documentation for full assessment, interventions and recommendations.   Outcome: Progressing  Note: Limitation: Decreased ADL status, Decreased UE strength, Decreased Safe judgement during ADL, Decreased endurance, Decreased self-care trans, Decreased high-level ADLs  Prognosis: Good  Assessment: Mattie Varela is a 79 y.o. female seen for OT evaluation s/p admit to [facility] on 10/11/2024 w/ Acute on chronic diastolic congestive heart failure (HCC).  Comorbidities affecting pt's functional performance at time of assessment include: HTN and obesity. Pt with active OT orders and activity orders for Up and OOB as tolerated. Personal factors affecting pt at time of IE include:difficulty performing ADLs, difficulty performing IADLs, difficulty performing transfers/mobility, fall risk , and functional decline . Prior to admission, pt lives at [facility]. Was I with ADLS and mobility with rollator. 0 falls. A for IADLs. Upon evaluation: Pt currently requires modA for UB ADLs, maxA for LB ADLs, total assist for toileting, maxA for bed mobility, EFRAIN for functional transfers, and EFRAIN mobility 2* the following deficits impacting occupational performance:weakness, decreased strength , decreased balance, and decreased activity tolerance. Pt to benefit from continued skilled OT tx while in the hospital to address deficits as defined above and maximize level of functional independence w ADL's and functional mobility. Occupational Performance areas to address  include: grooming, bathing/shower, toilet hygiene, dressing, health maintenance, functional mobility, and clothing management. From OT standpoint, recommendation at time of d/c would be level 2 resources. OT to follow pt on caseload 3-5x/wk.     Rehab Resource Intensity Level, OT: II (Moderate Resource Intensity)

## 2024-10-13 NOTE — PLAN OF CARE
Problem: PHYSICAL THERAPY ADULT  Goal: Performs mobility at highest level of function for planned discharge setting.  See evaluation for individualized goals.  Description: Treatment/Interventions: Functional transfer training, LE strengthening/ROM, Therapeutic exercise, Endurance training, Patient/family training, Bed mobility, Spoke to nursing, Gait training, OT          See flowsheet documentation for full assessment, interventions and recommendations.  Note: Prognosis: Fair  Problem List: Decreased strength, Decreased endurance, Impaired balance, Decreased mobility, Impaired sensation, Obesity, Pain  Assessment: Pt. 79 y.o.female presented w/ c/o chest pain, dyspnea & LE edema. Pt admitted for Acute on chronic diastolic congestive heart failure (HCC) w/ Chest pain & SADAF (acute kidney injury).  Pt referred to PT for mobility assessment & D/C planning w/ orders of Up as tolerated. Please see above for information re: home set-up & PLOF as well as objective findings during PT assessment. PTA, pt reports being fairly I w/ ADLs & amb w/ rollator. On eval, pt functioning below baseline hence will continue skilled PT to improve function & safety. Pt require maxAx2 for bed mobility + cues for techniques & safety. Pt w/ poor sitting tolerance at EOB 2* to severe back pain. Hence unable to tolerate transfers & amb at this time. The patient's AM-PAC Basic Mobility Inpatient Short Form Raw Score is 6. A Raw score of less than or equal to 16 suggests the patient may benefit from discharge to post-acute rehabilitation services. Please also refer to the recommendation of the Physical Therapist for safe discharge planning. From PT standpoint, will recommend Level II (moderate resource intensity) rehab services at D/C. No SOB & dizziness reported t/o session. Nsg staff most recent vital signs as follows: /74 (BP Location: Left arm)   Pulse 83   Temp 98.1 °F (36.7 °C) (Oral)   Resp 16   Ht 5' (1.524 m)   Wt 86.1 kg  (189 lb 13.1 oz)   LMP  (LMP Unknown)   SpO2 93%   BMI 37.07 kg/m² . At end of session, pt back in bed in stable condition, call bell & phone in reach, bed alarm activated, all lines intact. Fall precautions reinforced w/ good understanding. CM to follow. Nsg staff to continue to mobilized pt as tolerated to prevent further decline in function.  Nsg notified. Co-eval was necessary to complete this PT eval for the pt's best interest given pt's medical acuity & complexity.        Rehab Resource Intensity Level, PT: II (Moderate Resource Intensity)    See flowsheet documentation for full assessment.

## 2024-10-13 NOTE — PROGRESS NOTES
Progress Note - Hospitalist   Name: Mattie Varela 79 y.o. female I MRN: [id number]  Unit/Bed#: E5 -01 I Date of Admission: 10/11/2024   Date of Service: 10/13/2024 I Hospital Day: 2    Assessment & Plan  Acute on chronic diastolic congestive heart failure (HCC)  Wt Readings from Last 3 Encounters:   10/13/24 86.1 kg (189 lb 13.1 oz)   10/02/24 85.5 kg (188 lb 9.6 oz)   09/26/24 86.2 kg (190 lb)   Patient has had a significant weight gain since most recent hospitalization as well as cardiology follow-up  She has evidence of bilateral pitting lower extremity edema on admission  Home regimen was previously lasix but now she is on torsemide 30 mg daily (started 9/26/24)  Started on IV Lasix 60 mg IV twice daily here  Daily weights/ins and outs- down about 4.3 L since arrival   Question accuracy of weight since it did not change from yesterday  Recent echo with preserved EF  Cardiology following-ongoing diuresis   SADAF (acute kidney injury) (HCC)  Results from last 7 days   Lab Units 10/13/24  0554 10/12/24  0535 10/11/24  1228   CREATININE mg/dL 1.43* 1.50* 1.65*   Baseline around 1.1-1.3  Suspect cardiorenal in setting of heart failure  Monitor BMP with diuretics  Consider nephrology evaluation if Cr worsens  Acute low back pain  Complaining of low back pain  Pain with straight leg raise although pain also to palpitation  Denies any bowel or bladder incontinence  No saddle anesthesia on exam.  Reviewed prior imaging of lumbar spine 1/16/2024: T12-L5 posterior instrumented fusion.  Severe disc space narrowing at all visualized thoracolumbar levels similar to previous exam.  Partially imaged spinal stimulator device imaged.  Will get CT lumbar spine here  Applied Lidoderm patch today  Continue home regimen of Percocet and baclofen as needed  Degenerative lumbar spinal stenosis  Status post spinal stimulator  Utilizes baclofen as needed and Percocet 10 mg 3 times daily as needed  Type 2 diabetes mellitus with other  skin complications (HCC)  Lab Results   Component Value Date    HGBA1C 7.4 (H) 10/12/2024     Recent Labs     10/12/24  1605 10/12/24  2129 10/13/24  0658 10/13/24  1053   POCGLU 210* 143* 157* 198*   Carb controlled diet  Sliding scale insulin  Chronic pain disorder  Reviewed PDMP as well as facility paperwork  Prescribed Percocet 10-3 25 prn  Chest pain  EKG normal sinus rhythm without ischemic changes  Troponin unremarkable  Suspect related to heart failure, component of anxiety  Tele unremarkable  Hypertension  Continue metoprolol succinate 50 mg BID    Hypothyroidism  Continue Synthroid  Acute cystitis  Patient was diagnosed with acute cystitis as an outpatient was started on ciprofloxacin 7-day course through 10/15/2024  Continue renal adjusted dose through 10/15/2024  Overactive bladder  Received Botox injections in the past  Urinary retention protocol  Cerebrovascular accident (CVA), unspecified mechanism (HCC)  Continue aspirin, statin  Chronic obstructive pulmonary disease, unspecified COPD type (HCC)  Not in acute exacerbation  Continue albuterol as needed    VTE Pharmacologic Prophylaxis: VTE Score: 6 heparin    Mobility:   Basic Mobility Inpatient Raw Score: 6  -[facility] Goal: 2: Bed activities/Dependent transfer  -[facility] Achieved: 3: Sit at edge of bed      Patient Centered Rounds: I performed bedside rounds with nursing staff today.     Education and Discussions with Family / Patient: patient    Current Length of Stay: 2 day(s)  Current Patient Status: Inpatient   Certification Statement: With need for continued inpatient stay for ongoing IV diuresis and back pain    Code Status: Level 1 - Full Code    Subjective   Resting in bed.  Complaining of ongoing lower back pain.  Reports pain with any type of movement.  He denies any recent fall that her facilities.  She usually is able to ambulate.  Reports pain is severe and feels like she is unable to catch her breath because of it.  Decrease in lower  extremity edema noted.    Objective :  Temp:  [98 °F (36.7 °C)-98.4 °F (36.9 °C)] 98.1 °F (36.7 °C)  HR:  [77-88] 83  BP: (122-137)/(65-75) 137/74  Resp:  [16-18] 16  SpO2:  [93 %-99 %] 93 %    Body mass index is 37.07 kg/m².     Input and Output Summary (last 24 hours):     Intake/Output Summary (Last 24 hours) at 10/13/2024 1222  Last data filed at 10/13/2024 1205  Gross per 24 hour   Intake 180 ml   Output 2450 ml   Net -2270 ml       Physical Exam  Vitals and nursing note reviewed.   Constitutional:       General: She is not in acute distress.     Appearance: She is obese. She is not ill-appearing, toxic-appearing or diaphoretic.   HENT:      Head: Normocephalic and atraumatic.   Eyes:      General: No scleral icterus.  Cardiovascular:      Rate and Rhythm: Normal rate and regular rhythm.   Pulmonary:      Effort: Pulmonary effort is normal. No respiratory distress.      Breath sounds: Normal breath sounds. No stridor. No wheezing or rhonchi.   Abdominal:      General: Bowel sounds are normal. There is no distension.      Palpations: Abdomen is soft. There is no mass.      Tenderness: There is no abdominal tenderness.      Hernia: No hernia is present.   Musculoskeletal:         General: Tenderness present. No swelling.      Cervical back: Neck supple.   Skin:     General: Skin is warm and dry.   Neurological:      Mental Status: She is alert and oriented to person, place, and time. Mental status is at baseline.      Sensory: No sensory deficit.      Motor: No weakness.   Psychiatric:         Mood and Affect: Mood normal.         Behavior: Behavior normal.       Lines/Drains:  Lines/Drains/Airways       Active Status       Name Placement date Placement time Site Days    External Urinary Catheter 10/11/24  2300  -- 1                      Telemetry:  Telemetry Orders (From admission, onward)               24 Hour Telemetry Monitoring  Continuous x 24 Hours (Telem)        Expiring   Question:  Reason for 24 Hour  Telemetry  Answer:  Decompensated CHF- and any one of the following: continuous diuretic infusion or total diuretic dose >200 mg daily, associated electrolyte derangement (I.e. K < 3.0), ionotropic drip (continuous infusion), hx of ventricular arrhythmia, or new EF < 35%                                  Lab Results: I have reviewed the following results:   Results from last 7 days   Lab Units 10/13/24  0554 10/12/24  0535 10/11/24  1228   WBC Thousand/uL 8.28   < > 7.76   HEMOGLOBIN g/dL 11.9   < > 10.8*   HEMATOCRIT % 36.9   < > 34.5*   PLATELETS Thousands/uL 267   < > 264   SEGS PCT %  --   --  51   LYMPHO PCT %  --   --  25   MONO PCT %  --   --  9   EOS PCT %  --   --  14*    < > = values in this interval not displayed.     Results from last 7 days   Lab Units 10/13/24  0554 10/12/24  0535 10/11/24  1228   SODIUM mmol/L 138   < > 136   POTASSIUM mmol/L 4.5   < > 5.0   CHLORIDE mmol/L 95*   < > 99   CO2 mmol/L 35*   < > 30   BUN mg/dL 28*   < > 29*   CREATININE mg/dL 1.43*   < > 1.65*   ANION GAP mmol/L 8   < > 7   CALCIUM mg/dL 9.5   < > 9.4   ALBUMIN g/dL  --   --  3.8   TOTAL BILIRUBIN mg/dL  --   --  0.34   ALK PHOS U/L  --   --  123*   ALT U/L  --   --  7   AST U/L  --   --  17   GLUCOSE RANDOM mg/dL 213*   < > 128    < > = values in this interval not displayed.         Results from last 7 days   Lab Units 10/13/24  1053 10/13/24  0658 10/12/24  2129 10/12/24  1605 10/12/24  1112 10/12/24  0729 10/11/24  2059   POC GLUCOSE mg/dl 198* 157* 143* 210* 244* 135 124     Results from last 7 days   Lab Units 10/12/24  0535   HEMOGLOBIN A1C % 7.4*           Recent Cultures (last 7 days):               Last 24 Hours Medication List:     Current Facility-Administered Medications:     acetaminophen (TYLENOL) tablet 650 mg, Q6H PRN    albuterol (PROVENTIL HFA,VENTOLIN HFA) inhaler 2 puff, Q6H PRN    aluminum-magnesium hydroxide-simethicone (MAALOX) oral suspension 30 mL, Q6H PRN    aspirin chewable tablet 81 mg, Daily     atorvastatin (LIPITOR) tablet 40 mg, Daily    baclofen tablet 10 mg, BID PRN    ciprofloxacin (CIPRO) tablet 500 mg, Q24H    ferrous sulfate tablet 325 mg, Daily With Dinner    furosemide (LASIX) injection 60 mg, BID    heparin (porcine) subcutaneous injection 5,000 Units, Q8H ARY    insulin lispro (HumALOG/ADMELOG) 100 units/mL subcutaneous injection 1-5 Units, 4x Daily (AC & HS) **AND** Fingerstick Glucose (POCT), 4x Daily AC and at bedtime    latanoprost (XALATAN) 0.005 % ophthalmic solution 1 drop, HS    levothyroxine tablet 37.5 mcg, Early Morning    lidocaine (LIDODERM) 5 % patch 1 patch, Daily    magnesium Oxide (MAG-OX) tablet 400 mg, BID    meclizine (ANTIVERT) tablet 25 mg, Daily PRN    metoprolol succinate (TOPROL-XL) 24 hr tablet 50 mg, Q12H ARY    [Held by provider] Milnacipran HCl TABS 100 mg, Daily    ondansetron (ZOFRAN) injection 4 mg, Q6H PRN    oxyCODONE (ROXICODONE) immediate release tablet 10 mg, TID PRN    pantoprazole (PROTONIX) EC tablet 40 mg, Daily Before Breakfast    polyethylene glycol (MIRALAX) packet 17 g, Daily    senna-docusate sodium (SENOKOT S) 8.6-50 mg per tablet 1 tablet, BID    Administrative Statements   Today, Patient Was Seen By: Susannah Mcclain PA-C      **Please Note: This note may have been constructed using a voice recognition system.**

## 2024-10-13 NOTE — PLAN OF CARE
Problem: Potential for Falls  Goal: Patient will remain free of falls  Description: INTERVENTIONS:  - Educate patient/family on patient safety including physical limitations  - Instruct patient to call for assistance with activity   - Consult OT/PT to assist with strengthening/mobility   - Keep Call bell within reach  - Keep bed low and locked with side rails adjusted as appropriate  - Keep care items and personal belongings within reach  - Initiate and maintain comfort rounds  - Make Fall Risk Sign visible to staff  - Apply yellow socks and bracelet for high fall risk patients  - Consider moving patient to room near nurses station  Outcome: Progressing     Problem: PAIN - ADULT  Goal: Verbalizes/displays adequate comfort level or baseline comfort level  Description: Interventions:  - Encourage patient to monitor pain and request assistance  - Assess pain using appropriate pain scale  - Administer analgesics based on type and severity of pain and evaluate response  - Implement non-pharmacological measures as appropriate and evaluate response  - Consider cultural and social influences on pain and pain management  - Notify physician/advanced practitioner if interventions unsuccessful or patient reports new pain  Outcome: Progressing     Problem: INFECTION - ADULT  Goal: Absence or prevention of progression during hospitalization  Description: INTERVENTIONS:  - Assess and monitor for signs and symptoms of infection  - Monitor lab/diagnostic results  - Monitor all insertion sites, i.e. indwelling lines, tubes, and drains  - Monitor endotracheal if appropriate and nasal secretions for changes in amount and color  - [place] appropriate cooling/warming therapies per order  - Administer medications as ordered  - Instruct and encourage patient and family to use good hand hygiene technique  - Identify and instruct in appropriate isolation precautions for identified infection/condition  Outcome: Progressing  Goal: Absence  of fever/infection during neutropenic period  Description: INTERVENTIONS:  - Monitor WBC    Outcome: Progressing     Problem: SAFETY ADULT  Goal: Patient will remain free of falls  Description: INTERVENTIONS:  - Educate patient/family on patient safety including physical limitations  - Instruct patient to call for assistance with activity   - Consult OT/PT to assist with strengthening/mobility   - Keep Call bell within reach  - Keep bed low and locked with side rails adjusted as appropriate  - Keep care items and personal belongings within reach  - Initiate and maintain comfort rounds  - Make Fall Risk Sign visible to staff  - Apply yellow socks and bracelet for high fall risk patients  - Consider moving patient to room near nurses station  Outcome: Progressing  Goal: Maintain or return to baseline ADL function  Description: INTERVENTIONS:  - Educate patient/family on patient safety including physical limitations  - Instruct patient to call for assistance with activity   - Consult OT/PT to assist with strengthening/mobility   - Keep Call bell within reach  - Keep bed low and locked with side rails adjusted as appropriate  - Keep care items and personal belongings within reach  - Initiate and maintain comfort rounds  - Make Fall Risk Sign visible to staff  - Apply yellow socks and bracelet for high fall risk patients  - Consider moving patient to room near nurses station  Outcome: Progressing  Goal: Maintains/Returns to pre admission functional level  Description: INTERVENTIONS:  -  Assess patient's ability to carry out ADLs; assess patient's baseline for ADL function and identify physical deficits which impact ability to perform ADLs (bathing, care of mouth/teeth, toileting, grooming, dressing, etc.)  - Assess/evaluate cause of self-care deficits   - Assess range of motion  - Assess patient's mobility; develop plan if impaired  - Assess patient's need for assistive devices and provide as appropriate  - Encourage  maximum independence but intervene and supervise when necessary  - Involve family in performance of ADLs  - Assess for home care needs following discharge   - Consider OT consult to assist with ADL evaluation and planning for discharge  - Provide patient education as appropriate  Outcome: Progressing     Problem: DISCHARGE PLANNING  Goal: Discharge to home or other facility with appropriate resources  Description: INTERVENTIONS:  - Identify barriers to discharge w/patient and caregiver  - Arrange for needed discharge resources and transportation as appropriate  - Identify discharge learning needs (meds, wound care, etc.)  - Arrange for interpretive services to assist at discharge as needed  - Refer to Case Management Department for coordinating discharge planning if the patient needs post-hospital services based on physician/advanced practitioner order or complex needs related to functional status, cognitive ability, or social support system  Outcome: Progressing     Problem: Knowledge Deficit  Goal: Patient/family/caregiver demonstrates understanding of disease process, treatment plan, medications, and discharge instructions  Description: Complete learning assessment and assess knowledge base.  Interventions:  - Provide teaching at level of understanding  - Provide teaching via preferred learning methods  Outcome: Progressing     Problem: Prexisting or High Potential for Compromised Skin Integrity  Goal: Skin integrity is maintained or improved  Description: INTERVENTIONS:  - Identify patients at risk for skin breakdown  - Assess and monitor skin integrity  - Assess and monitor nutrition and hydration status  - Monitor labs   - Assess for incontinence   - Turn and reposition patient  - Assist with mobility/ambulation  - Relieve pressure over bony prominences  - Avoid friction and shearing  - Provide appropriate hygiene as needed including keeping skin clean and dry  - Evaluate need for skin moisturizer/barrier  cream  - Collaborate with interdisciplinary team   - Patient/family teaching  - Consider wound care consult   Outcome: Progressing     Problem: Nutrition/Hydration-ADULT  Goal: Nutrient/Hydration intake appropriate for improving, restoring or maintaining nutritional needs  Description: Monitor and assess patient's nutrition/hydration status for malnutrition. Collaborate with interdisciplinary team and initiate plan and interventions as ordered.  Monitor patient's weight and dietary intake as ordered or per policy. Utilize nutrition screening tool and intervene as necessary. Determine patient's food preferences and provide high-protein, high-caloric foods as appropriate.     INTERVENTIONS:  - Monitor oral intake, urinary output, labs, and treatment plans  - Assess nutrition and hydration status and recommend course of action  - Evaluate amount of meals eaten  - Assist patient with eating if necessary   - Allow adequate time for meals  - Recommend/ encourage appropriate diets, oral nutritional supplements, and vitamin/mineral supplements  - Order, calculate, and assess calorie counts as needed  - Recommend, monitor, and adjust tube feedings and TPN/PPN based on assessed needs  - Assess need for intravenous fluids  - Provide specific nutrition/hydration education as appropriate  - Include patient/family/caregiver in decisions related to nutrition  Outcome: Progressing

## 2024-10-13 NOTE — ASSESSMENT & PLAN NOTE
Complaining of low back pain  Pain with straight leg raise although pain also to palpitation  Denies any bowel or bladder incontinence  No saddle anesthesia on exam.  Reviewed prior imaging of lumbar spine 1/16/2024: T12-L5 posterior instrumented fusion.  Severe disc space narrowing at all visualized thoracolumbar levels similar to previous exam.  Partially imaged spinal stimulator device imaged.  Will get CT lumbar spine here  Applied Lidoderm patch today  Continue home regimen of Percocet and baclofen as needed

## 2024-10-13 NOTE — OCCUPATIONAL THERAPY NOTE
Occupational Therapy Evaluation     Patient Name: Mattie Varela  Today's Date: 10/13/2024  Problem List  Principal Problem:    Acute on chronic diastolic congestive heart failure (HCC)  Active Problems:    Degenerative lumbar spinal stenosis    Type 2 diabetes mellitus with other skin complications ([facility])    Chronic pain disorder    Hypertension    Hypothyroidism    Overactive bladder    Cerebrovascular accident (CVA), unspecified mechanism (HCC)    Chronic obstructive pulmonary disease, unspecified COPD type (HCC)    SADAF (acute kidney injury) ([facility])    Chest pain    Acute cystitis    Acute low back pain    Past Medical History  Past Medical History:   Diagnosis Date    Acid reflux     Acute kidney injury (HCC)     Anemia     hx of iron-deficient    Anxiety     Arthritis     Asthma     last needed inhaler last year 2020    Basal cell carcinoma     upper lip    Chronic narcotic dependence (HCC)     Chronic pain     Colon polyp     Cystocele     Diabetes mellitus ([facility])     stable    Disease of thyroid gland     hypothyroidism    Diverticulosis     Dizziness     at times    Dysfunctional uterine bleeding     last assessed - [id number]    Dysphagia     Fibromyalgia     Gastric ulcer     Gastroparesis     History of colonic polyps     last assessed - [id number]    History of gastroesophageal reflux (GERD)     Hypercholesterolemia     Hyperlipidemia     Hypertension     Hyponatremia     IBS (irritable bowel syndrome)     Pneumobilia 06/18/2022    Post laminectomy syndrome     S/P insertion of spinal cord stimulator 07/18/2018    s/p Medtronic loop recorder 3/2/2024 03/02/2024    Seasonal allergies     Shortness of breath     exertional    Spinal stenosis     Status post lumbar spinal fusion 03/16/2018    Stroke ([facility])     pt states slight stroke March 2022     Past Surgical History  Past Surgical History:   Procedure Laterality Date    APPENDECTOMY      BACK SURGERY      BREAST CYST EXCISION Left     CARDIAC  CATHETERIZATION  11/14/2023    Procedure: Cardiac catheterization;  Surgeon: Randolph Holt MD;  [facility];  Service: Cardiology    CARDIAC CATHETERIZATION N/A 11/14/2023    Procedure: Cardiac Coronary Angiogram;  Surgeon: Randolph Holt MD;  [facility];  Service: Cardiology    CARDIAC ELECTROPHYSIOLOGY PROCEDURE N/A 3/2/2024    Procedure: Cardiac loop recorder implant;  Surgeon: Edu Fontaine MD;  [facility];  Service: Cardiology    CHOLECYSTECTOMY      COLONOSCOPY      ESOPHAGOGASTRODUODENOSCOPY N/A 09/28/2016    Procedure: ESOPHAGOGASTRODUODENOSCOPY (EGD);  Surgeon: Mylene Moeller MD;  Location: [facility];  Service:     HERNIA REPAIR      HYSTERECTOMY      TTAH-BSO age 28    LAMINECTOMY      LUMBAR LAMINECTOMY      OOPHORECTOMY      age 28    OK ARTHRODESIS POSTERIOR/PSTLAT TQ 1NTRSPC THORACIC N/A 06/04/2018    Procedure: Reopening of lumbar incision for T12-L5 posterior instrumented fixation and fusion and T12-L4 posterior decompression;  Surgeon: Wood Rogel MD;  Location: [facility];  Service: Neurosurgery    OK COLONOSCOPY FLX DX W/COLLJ SPEC WHEN PFRMD N/A 03/02/2016    Procedure: EGD AND COLONOSCOPY;  Surgeon: Mylene Moeller MD;  Location: [facility];  Service: Gastroenterology    OK DILATION ESOPH UNGUIDED SOUND/BOUGIE 1/MULT PASS N/A 09/28/2016    Procedure: DILATATION ESOPHAGEAL;  Surgeon: Mylene Moeller MD;  [facility]: [facility];  Service: Gastroenterology    OK ESOPHAGOGASTRODUODENOSCOPY TRANSORAL DIAGNOSTIC N/A 07/18/2016    Procedure: ESOPHAGOGASTRODUODENOSCOPY (EGD);  Surgeon: Mylene Moeller MD;  [facility]: [facility];  Service: Gastroenterology    OK INSJ/RPLCMT SPINAL NPG/RCVR POCKET CRTJ&CONNJ Left 06/04/2018    Procedure: removal of left buttock implantable pulse generator and placement of new  implantable pulse generator;  Surgeon: Wood Rogel MD;  [facility]: [facility];  Service: Neurosurgery           10/13/24 0956   OT Last Visit    OT Visit Date 10/13/24   Note Type   Note type Evaluation   Additional Comments greeted in supine and agreeable   Pain Assessment   Pain Assessment Tool 0-10   Pain Score 10 - Worst Possible Pain   Pain Location/Orientation Location: Back   Restrictions/Precautions   Weight Bearing Precautions Per Order No   Other Precautions Chair Alarm;Bed Alarm;Multiple lines;Fall Risk;Pain   Home Living   Type of Home Assisted living  ([facility])   Home Layout One level   Bathroom Shower/Tub Walk-in shower   Bathroom Toilet Raised   Bathroom Equipment Grab bars in shower;Shower chair;Grab bars around toilet   Home Equipment Walker  (rollator)   Prior Function   Level of [place] Independent with functional mobility;Independent with ADLs;Needs assistance with IADLS   Lives With Facility staff   Receives Help From   (facility staff)   IADLs Family/Friend/Other provides transportation;Family/Friend/Other provides meals;Family/Friend/Other provides medication management   Falls in the last 6 months 0   Vocational Retired   ADL   Where Assessed Edge of bed   Eating Assistance 5  Supervision/Setup   Grooming Assistance 5  Supervision/Setup   UB Bathing Assistance 4  Minimal Assistance   LB Bathing Assistance 2  Maximal Assistance   UB Dressing Assistance 3  Moderate Assistance   LB Dressing Assistance 2  Maximal Assistance   Toileting Assistance  1  Total Assistance   Bed Mobility   Rolling R 2  Maximal assistance   Additional items Assist x 1;Bedrails   Rolling L 2  Maximal assistance   Additional items Assist x 1;Bedrails   Supine to Sit 2  Maximal assistance   Additional items Assist x 2;HOB elevated;Bedrails;Increased time required;Verbal cues;LE management   Sit to Supine 2  Maximal assistance   Additional items Assist x 2;Increased time required;Verbal cues;LE management   Transfers   Sit to Stand Unable to assess   Balance   Static Sitting Fair   Dynamic Sitting Fair -   Static Standing Zero   Activity Tolerance    Activity Tolerance Patient limited by fatigue;Patient limited by pain   Medical Staff Made Aware PT Cosme   Nurse Made Aware RN Veronica   RUMEHRAN Assessment   RUE Assessment WFL   LUE Assessment   LUE Assessment WFL   Hand Function   Gross Motor Coordination Functional   Fine Motor Coordination Functional   Cognition   Overall Cognitive Status WFL   Arousal/Participation Cooperative   Attention Attends with cues to redirect   Orientation Level Oriented to person;Oriented to place;Oriented to time   Memory Decreased recall of recent events   Following Commands Follows one step commands without difficulty   Assessment   Limitation Decreased ADL status;Decreased UE strength;Decreased Safe judgement during ADL;Decreased endurance;Decreased self-care trans;Decreased high-level ADLs   Prognosis Good   Assessment Mattie Varela is a 79 y.o. female seen for OT evaluation s/p admit to [facility] on 10/11/2024 w/ Acute on chronic diastolic congestive heart failure (HCC).  Comorbidities affecting pt's functional performance at time of assessment include: HTN and obesity. Pt with active OT orders and activity orders for Up and OOB as tolerated. Personal factors affecting pt at time of IE include:difficulty performing ADLs, difficulty performing IADLs, difficulty performing transfers/mobility, fall risk , and functional decline . Prior to admission, pt lives at [facility]. Was I with ADLS and mobility with rollator. 0 falls. A for IADLs. Upon evaluation: Pt currently requires modA for UB ADLs, maxA for LB ADLs, total assist for toileting, maxA for bed mobility, EFRAIN for functional transfers, and EFRAIN mobility 2* the following deficits impacting occupational performance:weakness, decreased strength , decreased balance, and decreased activity tolerance. Pt to benefit from continued skilled OT tx while in the hospital to address deficits as defined above and maximize level of functional independence w ADL's and functional mobility.  Occupational Performance areas to address include: grooming, bathing/shower, toilet hygiene, dressing, health maintenance, functional mobility, and clothing management. From OT standpoint, recommendation at time of d/c would be level 2 resources. OT to follow pt on caseload 3-5x/wk.   Goals   Patient Goals less pain   STG Time Frame 3-5   Short Term Goal #1 Pt will complete bed mobility at a Mod I level w/ G balance/safety demonstrated to decrease caregiver assistance required   Short Term Goal #2 Pt will complete LB dressing/self care w/ mod I using adaptive device and DME as needed   Short Term Goal  Pt will complete toileting w/ mod I w/ G hygiene/thoroughness using DME as needed   LTG Time Frame 10-14   Long Term Goal #1 Pt will improve functional transfers to Mod I on/off all surfaces using DME as needed w/ G balance/safety   Long Term Goal #2 Pt will improve functional mobility during ADL/IADL/leisure tasks to Mod I using DME as needed w/ G balance/safety   Long Term Goal Pt will demonstrate G carryover of pt/caregiver education and training as appropriate w/o cues w/ good tolerance to increase safety during functional tasks   Plan   Treatment Interventions ADL retraining;Functional transfer training;UE strengthening/ROM;Endurance training;Patient/family training;Neuromuscular reeducation;Equipment evaluation/education;Compensatory technique education;Activityengagement;Energy conservation   Goal Expiration Date 10/27/24   OT Treatment Day 0   OT Frequency 3-5x/wk   Discharge Recommendation   Rehab Resource Intensity Level, OT II (Moderate Resource Intensity)   Additional Comments  The patient's raw score on the -PAC Daily Activity Inpatient Short Form is 13. A raw score of less than 19 suggests the patient may benefit from discharge to post-acute rehabilitation services. Please refer to the recommendation of the Occupational Therapist for safe discharge planning.   AM-PAC Daily Activity Inpatient   Lower  Body Dressing 1   Bathing 2   Toileting 1   Upper Body Dressing 2   Grooming 3   Eating 4   Daily Activity Raw Score 13   Daily Activity Standardized Score (Calc for Raw Score >=11) 32.03   AM-PAC Applied Cognition Inpatient   Following a Speech/Presentation 4   Understanding Ordinary Conversation 4   Taking Medications 3   Remembering Where Things Are Placed or Put Away 4   Remembering List of 4-5 Errands 4   Taking Care of Complicated Tasks 4   Applied Cognition Raw Score 23   Applied Cognition Standardized Score 53.08   Miri Bunn, OT

## 2024-10-13 NOTE — PROGRESS NOTES
Cardiology Progress Note - Mattie Varela 79 y.o. female MRN: [id number]    Unit/Bed#: E5 -01 Encounter: [id number]      Assessment & Plan:    Acute on chronic diastolic congestive heart failure (HCC)  -TTE 5/23/2024 showed EF 70%, grade 1 DD, mild dynamic LVOT obstruction with a peak gradient of 25 mmHg with Valsalva, mild TR  - BNP 55 on admission  - Chest x-ray on admission showed no acute cardiopulmonary disease  - CT chest abdomen pelvis 10/11/2024 showed no evidence of consolidation or pleural effusion  - Discharge weight 5/30/2024 166 pounds, office weight 7/24/2024 176 pounds, weight on admission was 191 pounds  - Outpatient diuretic Rx furosemide 20 mg p.o. daily-> changed to torsemide 30 mg daily on 9/26/2024 due to weight gain  - Current diuretic Rx furosemide 60 mg IV twice daily    Precordial chest pain  - Patient midsternal chest discomfort radiating to her right side prior to admission  - No acute ischemic changes on ECG  - Troponins negative x 2 on admission  - No evidence of ACS at this time    Coronary artery disease  - LHC 11/14/2024 showed diffuse calcified CAD with no focal lesions, OM1 70% with normal IFR, no interventions performed  - Continue with aspirin 81 mg daily    Hypertension  - Outpatient Rx Toprol-XL 50 mg twice daily    Hyperlipidemia  - Continue with atorvastatin 40 mg daily    Cerebrovascular accident (CVA), unspecified mechanism (HCC)  -CTA November 2023 showed moderate stenosis of the distal right MCA M1, focal moderate stenosis involving the inferior basilar artery with a diffuse tubular dilation at the right ICA origin  - Continue with aspirin 81 mg daily and atorvastatin 40 mg daily  - Underwent Medtronic loop recorder implantation on 3/2/2024  - No recorded events as of last interrogation on 9/17/2024    Degenerative lumbar spinal stenosis    Type 2 diabetes mellitus with other skin complications (HCC)    Chronic pain disorder    Chronic obstructive pulmonary disease,  unspecified COPD type (HCC)    SADAF (acute kidney injury) ([facility])    Acute cystitis    Hypothyroidism    Overactive bladder     Summary:  -Patient diuresed 3.4 L yesterday, volume status appears to be improving on exam  -BUN/creatinine also improving  -Continue with furosemide 60 mg IV twice daily for diuresis  - Check daily standing weights  - Monitor creatinine and electrolytes closely       Subjective:   No significant events overnight.  She reports having severe back pain this morning that is limiting her mobility.  Reports having difficulty taking deep breaths to the back pain as well.  Denies chest pain, abdominal pain, nausea, vomiting, fever, chills, headache, dizziness or palpitations.    Objective:     Vitals: Blood pressure 137/74, pulse 83, temperature 98.1 °F (36.7 °C), temperature source Oral, resp. rate 16, height 5' (1.524 m), weight 86.1 kg (189 lb 13.1 oz), SpO2 93%, not currently breastfeeding., Body mass index is 37.07 kg/m².,   Orthostatic Blood Pressures      Flowsheet Row Most Recent Value   Blood Pressure 137/74 filed at 10/13/2024 0654   Patient Position - Orthostatic VS Lying filed at 10/13/2024 0654              Intake/Output Summary (Last 24 hours) at 10/13/2024 0948  Last data filed at 10/13/2024 0900  Gross per 24 hour   Intake 180 ml   Output 1650 ml   Net -1470 ml           Physical Exam:    GEN: Mattie Varela appears well, alert and oriented x 3, pleasant and cooperative   HEENT: Mucous membranes moist, no scleral icterus, no conjunctival pallor  NECK: No elevated JVD  HEART: Regular rate and rhythm, normal S1 and S2, no murmurs or rubs   LUNGS: clear to auscultation bilaterally; no wheezes, rales, or rhonchi   ABDOMEN: normal bowel sounds, soft, no tenderness, no distention  EXTREMITIES: peripheral pulses normal; 1-2+ bilateral lower extremity edema  NEURO: no focal findings   SKIN: No lesions or rashes on exposed skin        Current Facility-Administered Medications:      acetaminophen (TYLENOL) tablet 650 mg, 650 mg, Oral, Q6H PRN, Alexys Angel DO, 650 mg at 10/13/24 0612    albuterol (PROVENTIL HFA,VENTOLIN HFA) inhaler 2 puff, 2 puff, Inhalation, Q6H PRN, Alexys Angel DO    aluminum-magnesium hydroxide-simethicone (MAALOX) oral suspension 30 mL, 30 mL, Oral, Q6H PRN, Alexys Angel DO    aspirin chewable tablet 81 mg, 81 mg, Oral, Daily, Alexys Angel DO, 81 mg at 10/13/24 0803    atorvastatin (LIPITOR) tablet 40 mg, 40 mg, Oral, Daily, Alexys Angel DO, 40 mg at 10/13/24 0803    baclofen tablet 10 mg, 10 mg, Oral, BID PRN, Alexys Angel DO, 10 mg at 10/13/24 0803    ciprofloxacin (CIPRO) tablet 500 mg, 500 mg, Oral, Q24H, Alexys Angel DO, 500 mg at 10/12/24 1213    ferrous sulfate tablet 325 mg, 325 mg, Oral, Daily With Dinner, Alexys Angel DO, 325 mg at 10/12/24 1614    furosemide (LASIX) injection 60 mg, 60 mg, Intravenous, BID, Alexys Angel DO, 60 mg at 10/13/24 0803    heparin (porcine) subcutaneous injection 5,000 Units, 5,000 Units, Subcutaneous, Q8H ARY, Alexys Angel DO, 5,000 Units at 10/13/24 0523    insulin lispro (HumALOG/ADMELOG) 100 units/mL subcutaneous injection 1-5 Units, 1-5 Units, Subcutaneous, 4x Daily (AC & HS), 1 Units at 10/13/24 0803 **AND** Fingerstick Glucose (POCT), , , 4x Daily AC and at bedtime, Alexys Angel DO    latanoprost (XALATAN) 0.005 % ophthalmic solution 1 drop, 1 drop, Both Eyes, HS, Alexys Angel DO, 1 drop at 10/12/24 2204    levothyroxine tablet 37.5 mcg, 37.5 mcg, Oral, Early Morning, Alexys Angel DO, 37.5 mcg at 10/13/24 0523    lidocaine (LIDODERM) 5 % patch 1 patch, 1 patch, Topical, Daily, Susannah Mcclain PA-C, 1 patch at 10/13/24 0803    magnesium Oxide (MAG-OX) tablet 400 mg, 400 mg, Oral, BID, Alexys Angel DO, 400 mg at 10/13/24 0803    meclizine (ANTIVERT) tablet 25 mg, 25 mg, Oral, Daily PRN, Alexys Angel DO    metoprolol succinate (TOPROL-XL) 24  hr tablet 50 mg, 50 mg, Oral, Q12H ARY, Alexys Angel DO, 50 mg at 10/13/24 0803    [Held by provider] Milnacipran HCl TABS 100 mg, 1 tablet, Oral, Daily, Alexys Angel DO    ondansetron (ZOFRAN) injection 4 mg, 4 mg, Intravenous, Q6H PRN, Alexys Angel DO    oxyCODONE (ROXICODONE) immediate release tablet 10 mg, 10 mg, Oral, TID PRN, Alexys Angel DO, 10 mg at 10/13/24 0334    pantoprazole (PROTONIX) EC tablet 40 mg, 40 mg, Oral, Daily Before Breakfast, Alexys Angel DO, 40 mg at 10/13/24 0523    polyethylene glycol (MIRALAX) packet 17 g, 17 g, Oral, Daily, Alexys Angel DO, 17 g at 10/13/24 0803    senna-docusate sodium (SENOKOT S) 8.6-50 mg per tablet 1 tablet, 1 tablet, Oral, BID, Susannah Mcclain PA-C, 1 tablet at 10/13/24 0803    Labs & Results:    Lab Results   Component Value Date    CKTOTAL 50 05/26/2024    TROPONINI 0.04 07/16/2020    TROPONINI 0.06 (H) 07/15/2020       Lab Results   Component Value Date    GLUCOSE 193 (H) 06/04/2018    CALCIUM 9.5 10/13/2024     04/30/2015    K 4.5 10/13/2024    CO2 35 (H) 10/13/2024    CL 95 (L) 10/13/2024    BUN 28 (H) 10/13/2024    CREATININE 1.43 (H) 10/13/2024       Lab Results   Component Value Date    WBC 8.28 10/13/2024    HGB 11.9 10/13/2024    HCT 36.9 10/13/2024    MCV 91 10/13/2024     10/13/2024           Lab Results   Component Value Date    CHOL 165 04/30/2015    CHOL 208 03/20/2014     Lab Results   Component Value Date    HDL 42 (L) 11/26/2023    HDL 50 11/11/2023     Lab Results   Component Value Date    LDLCALC 42 11/26/2023    LDLCALC 62 11/11/2023     Lab Results   Component Value Date    TRIG 179 (H) 11/26/2023    TRIG 84 11/11/2023       Lab Results   Component Value Date    ALT 7 10/11/2024    AST 17 10/11/2024    ALKPHOS 123 (H) 10/11/2024         EKG personally reviewed by )Natan Guzman MD. No acute changes   TELE: No significant arrhythmias seen on telemetry review.

## 2024-10-13 NOTE — ASSESSMENT & PLAN NOTE
Wt Readings from Last 3 Encounters:   10/13/24 86.1 kg (189 lb 13.1 oz)   10/02/24 85.5 kg (188 lb 9.6 oz)   09/26/24 86.2 kg (190 lb)   Patient has had a significant weight gain since most recent hospitalization as well as cardiology follow-up  She has evidence of bilateral pitting lower extremity edema on admission  Home regimen was previously lasix but now she is on torsemide 30 mg daily (started 9/26/24)  Started on IV Lasix 60 mg IV twice daily here  Daily weights/ins and outs- down about 4.3 L since arrival   Question accuracy of weight since it did not change from yesterday  Recent echo with preserved EF  Cardiology following-ongoing diuresis

## 2024-10-13 NOTE — PHYSICAL THERAPY NOTE
PT EVALUATION    Pt. Name: Mattie Varela  Pt. Age: 79 y.o.  MRN: [id number]  LENGTH OF STAY: 2      Admitting Diagnoses:   Chest pain [R07.9]  CHF exacerbation (HCC) [I50.9]  SADAF (acute kidney injury) (HCC) [N17.9]    Past Medical History:   Diagnosis Date    Acid reflux     Acute kidney injury (HCC)     Anemia     hx of iron-deficient    Anxiety     Arthritis     Asthma     last needed inhaler last year 2020    Basal cell carcinoma     upper lip    Chronic narcotic dependence (HCC)     Chronic pain     Colon polyp     Cystocele     Diabetes mellitus (HCC)     stable    Disease of thyroid gland     hypothyroidism    Diverticulosis     Dizziness     at times    Dysfunctional uterine bleeding     last assessed - [id number]    Dysphagia     Fibromyalgia     Gastric ulcer     Gastroparesis     History of colonic polyps     last assessed - [id number]    History of gastroesophageal reflux (GERD)     Hypercholesterolemia     Hyperlipidemia     Hypertension     Hyponatremia     IBS (irritable bowel syndrome)     Pneumobilia 06/18/2022    Post laminectomy syndrome     S/P insertion of spinal cord stimulator 07/18/2018    s/p [employer] loop recorder 3/2/2024 03/02/2024    Seasonal allergies     Shortness of breath     exertional    Spinal stenosis     Status post lumbar spinal fusion 03/16/2018    Stroke ([facility])     pt states slight stroke March 2022       Past Surgical History:   Procedure Laterality Date    APPENDECTOMY      BACK SURGERY      BREAST CYST EXCISION Left     CARDIAC CATHETERIZATION  11/14/2023    Procedure: Cardiac catheterization;  Surgeon: Randolph Holt MD;  [facility];  Service: Cardiology    CARDIAC CATHETERIZATION N/A 11/14/2023    Procedure: Cardiac Coronary Angiogram;  Surgeon: Randolph Holt MD;  [facility];  Service: Cardiology    CARDIAC ELECTROPHYSIOLOGY PROCEDURE N/A 3/2/2024    Procedure: Cardiac loop recorder implant;  Surgeon: Edu Fontaine MD;   [facility]: [facility];  Service: Cardiology    CHOLECYSTECTOMY      COLONOSCOPY      ESOPHAGOGASTRODUODENOSCOPY N/A 09/28/2016    Procedure: ESOPHAGOGASTRODUODENOSCOPY (EGD);  Surgeon: Mylene Moeller MD;  Location: [facility] GI [facility];  Service:     HERNIA REPAIR      HYSTERECTOMY      TTAH-BSO age 28    LAMINECTOMY      LUMBAR LAMINECTOMY      OOPHORECTOMY      age 28    HI ARTHRODESIS POSTERIOR/PSTLAT TQ 1NTRSPC THORACIC N/A 06/04/2018    Procedure: Reopening of lumbar incision for T12-L5 posterior instrumented fixation and fusion and T12-L4 posterior decompression;  Surgeon: Wood Rogel MD;  Location: [facility];  Service: Neurosurgery    HI COLONOSCOPY FLX DX W/COLLJ SPEC WHEN PFRMD N/A 03/02/2016    Procedure: EGD AND COLONOSCOPY;  Surgeon: Mylene Moeller MD;  [facility]: [facility];  Service: Gastroenterology    HI DILATION ESOPH UNGUIDED SOUND/BOUGIE 1/MULT PASS N/A 09/28/2016    Procedure: DILATATION ESOPHAGEAL;  Surgeon: Mylene Moeller MD;  Location: [facility];  Service: Gastroenterology    HI ESOPHAGOGASTRODUODENOSCOPY TRANSORAL DIAGNOSTIC N/A 07/18/2016    Procedure: ESOPHAGOGASTRODUODENOSCOPY (EGD);  Surgeon: Mylene Moeller MD;  Location: [facility];  Service: Gastroenterology    HI INSJ/RPLCMT SPINAL NPG/RCVR POCKET CRTJ&CONNJ Left 06/04/2018    Procedure: removal of left buttock implantable pulse generator and placement of new  implantable pulse generator;  Surgeon: Wood Rogel MD;  [facility]: [facility];  Service: Neurosurgery       Imaging Studies:  CT chest abdomen pelvis wo contrast   Final Result by Liam Potts MD (10/11 8596)      No evidence of consolidation or pleural effusion.      No evidence of hydronephrosis or stone.      Bladder distention with air-fluid level should be correlated with urinalysis and any recent instrumentation. Correlation with any voiding dysfunction is advised. Bateman catheter may be useful.      No small bowel dilatation.      The study is limited due to extensive streak  artifacts from orthopedic hardware.      This was discussed with Lamonte GAMBOA at 3:23 p.m.               Workstation performed: [id number]         XR chest 1 view portable   Final Result by Natan Sena MD (10/11 7963)      No acute cardiopulmonary disease.            Workstation performed: [id number]               10/13/24 1010   PT Last Visit   PT Visit Date 10/13/24   Note Type   Note type Evaluation   Pain Assessment   Pain Score 10 - Worst Possible Pain   Pain Location/Orientation Location: Back   Hospital Pain Intervention(s) Repositioned;Ambulation/increased activity;Emotional support;Rest  (RN made aware)   Restrictions/Precautions   Weight Bearing Precautions Per Order No   Other Precautions Chair Alarm;Bed Alarm;Multiple lines;Fall Risk;Pain   Home Living   Type of Home Assisted living  ([facility])   Home Layout One level   Bathroom Shower/Tub Walk-in shower   Bathroom Toilet Raised   Bathroom Equipment Grab bars in shower;Shower chair;Grab bars around toilet   Home Equipment Walker  (rollator)   Prior Function   Level of [place] Independent with functional mobility;Independent with ADLs;Needs assistance with IADLS  (ambulates w/ rollator)   Lives With Facility staff   Receives Help From Other (Comment)  (facility staff)   Falls in the last 6 months 0   Vocational Retired   Comments (-) ; pt reports able to complete ADL's & ambulate w/ rollator independently at baseline   General   Family/Caregiver Present No   Cognition   Overall Cognitive Status WFL   Arousal/Participation Alert   Orientation Level Oriented to person;Oriented to place;Oriented to time   Following Commands Follows one step commands without difficulty   Comments pleasant & cooperative w/ encouragement   Subjective   Subjective Pt agreeable to PT/OT evals w/ encouragement 2* to severe back pain.   RUE Assessment   RUE Assessment   (refer to OT)   LUE Assessment   LUE Assessment   (refer to OT)   RLE Assessment   RLE  Assessment X  (overall strength limited to pain except ankle 3+/5 grossly)   LLE Assessment   LLE Assessment X  (overall strength limited to pain except ankle 3+/5 grossly)   Coordination   Sensation X  (B/L feet numbness)   Bed Mobility   Rolling R 2  Maximal assistance   Additional items Assist x 1;HOB elevated;Bedrails;Increased time required;Verbal cues;LE management   Rolling L 2  Maximal assistance   Additional items Assist x 1;HOB elevated;Bedrails;Increased time required;Verbal cues;LE management   Supine to Sit 2  Maximal assistance   Additional items Assist x 2;HOB elevated;Bedrails;Increased time required;Verbal cues;LE management   Sit to Supine 2  Maximal assistance   Additional items Assist x 2;Increased time required;Verbal cues;LE management   Additional Comments cues for techniques & safety; pt able to sit EOB but w/ poor sitting tolerance 2* to severe back pain hence requested to get back in bed   Transfers   Sit to Stand Unable to assess   Stand to Sit Unable to assess   Additional Comments pt unable to tolerate 2* to severe back pain upon sitting EOB   Ambulation/Elevation   Gait pattern Not tested   Ambulation/Elevation Additional Comments pt unable to tolerate 2* to severe back pain upon sitting EOB   Balance   Static Sitting Fair   Dynamic Sitting Fair -   Static Standing Zero   Endurance Deficit   Endurance Deficit Yes   Endurance Deficit Description severe back pain   Activity Tolerance   Activity Tolerance Patient limited by pain;Treatment limited secondary to medical complications (Comment)   Medical Staff Made Aware EMIGDIO Siddiqui   Nurse Made Aware yesVeronica   Assessment   Prognosis Fair   Problem List Decreased strength;Decreased endurance;Impaired balance;Decreased mobility;Impaired sensation;Obesity;Pain   Assessment Pt. 79 y.o.female presented w/ c/o chest pain, dyspnea & LE edema. Pt admitted for Acute on chronic diastolic congestive heart failure (HCC) w/ Chest pain & SADAF (acute  kidney injury).  Pt referred to PT for mobility assessment & D/C planning w/ orders of Up as tolerated. Please see above for information re: home set-up & PLOF as well as objective findings during PT assessment. PTA, pt reports being fairly I w/ ADLs & amb w/ rollator. On eval, pt functioning below baseline hence will continue skilled PT to improve function & safety. Pt require maxAx2 for bed mobility + cues for techniques & safety. Pt w/ poor sitting tolerance at EOB 2* to severe back pain. Hence unable to tolerate transfers & amb at this time. The patient's AM-PAC Basic Mobility Inpatient Short Form Raw Score is 6. A Raw score of less than or equal to 16 suggests the patient may benefit from discharge to post-acute rehabilitation services. Please also refer to the recommendation of the Physical Therapist for safe discharge planning. From PT standpoint, will recommend Level II (moderate resource intensity) rehab services at D/C. No SOB & dizziness reported t/o session. Nsg staff most recent vital signs as follows: /74 (BP Location: Left arm)   Pulse 83   Temp 98.1 °F (36.7 °C) (Oral)   Resp 16   Ht 5' (1.524 m)   Wt 86.1 kg (189 lb 13.1 oz)   LMP  (LMP Unknown)   SpO2 93%   BMI 37.07 kg/m² . At end of session, pt back in bed in stable condition, call bell & phone in reach, bed alarm activated, all lines intact. Fall precautions reinforced w/ good understanding. CM to follow. Nsg staff to continue to mobilized pt as tolerated to prevent further decline in function.  Nsg notified. Co-eval was necessary to complete this PT eval for the pt's best interest given pt's medical acuity & complexity.   Goals   Patient Goals to have less pain   STG Expiration Date 10/27/24   Short Term Goal #1 Goals to be met in 14 days; pt will be able to: 1) inc strength & balance by 1/2 grade to improve overall functional mobility & dec fall risk; 2) inc bed mobility to minAx1 for pt to be able to get in/OOB safely w/ proper  techniques 100% of the time, to dec caregiver burden & safely function at home; 3) inc transfers to minAx1 for pt to transition safely from one surface to another w/o % of the time, to dec caregiver burden & safely function at home; 4) continue pre-gait training w/ RW then progress to amb w/ appropriate AD w/ modAx1 approx. >20'; 5) PT to revise goals as appropriate; 6) pt/caregiver ed   PT Treatment Day 0   Plan   Treatment/Interventions Functional transfer training;LE strengthening/ROM;Therapeutic exercise;Endurance training;Patient/family training;Bed mobility;Spoke to nursing;Gait training;OT   PT Frequency 3-5x/wk   Discharge Recommendation   Rehab Resource Intensity Level, PT II (Moderate Resource Intensity)   AM-PAC Basic Mobility Inpatient   Turning in Flat Bed Without Bedrails 1   Lying on Back to Sitting on Edge of Flat Bed Without Bedrails 1   Moving Bed to Chair 1   Standing Up From Chair Using Arms 1   Walk in Room 1   Climb 3-5 Stairs With Railing 1   Basic Mobility Inpatient Raw Score 6   Turning Head Towards Sound 4   Follow Simple Instructions 4   Low Function Basic Mobility Raw Score  14   Low Function Basic Mobility Standardized Score  22.01   [facility] Highest Level Of Mobility   -HL Goal 2: Bed activities/Dependent transfer   -[facility] Achieved 3: Sit at edge of bed   End of Consult   Patient Position at End of Consult Supine;Bed/Chair alarm activated;All needs within reach   End of Consult Comments Pt in stable condition. All needs in reach. All lines intact. Bed alarm activated.   Hx/personal factors: co-morbidities, inaccessible home, advanced age, mutliple lines, use of AD, pain, fall risk, assist w/ ADL's, and obesity  Examination: dec mobility, dec balance, dec endurance, dec amb, risk for falls, pain  Clinical: unpredictable (ongoing medical status, abnormal lab values, risk for falls, and pain mgt)  Complexity: high    Cosme Natalie

## 2024-10-13 NOTE — ASSESSMENT & PLAN NOTE
EKG normal sinus rhythm without ischemic changes  Troponin unremarkable  Suspect related to heart failure, component of anxiety  Tele unremarkable

## 2024-10-13 NOTE — PLAN OF CARE
Problem: Potential for Falls  Goal: Patient will remain free of falls  Description: INTERVENTIONS:  - Educate patient/family on patient safety including physical limitations  - Instruct patient to call for assistance with activity   - Consult OT/PT to assist with strengthening/mobility   - Keep Call bell within reach  - Keep bed low and locked with side rails adjusted as appropriate  - Keep care items and personal belongings within reach  - Initiate and maintain comfort rounds  - Make Fall Risk Sign visible to staff  - Offer Toileting every 2 Hours, in advance of need  - Initiate/Maintain bed alarm  - Obtain necessary fall risk management equipment: gripper socks and call bell  - Apply yellow socks and bracelet for high fall risk patients  - Consider moving patient to room near nurses station  Outcome: Progressing     Problem: PAIN - ADULT  Goal: Verbalizes/displays adequate comfort level or baseline comfort level  Description: Interventions:  - Encourage patient to monitor pain and request assistance  - Assess pain using appropriate pain scale  - Administer analgesics based on type and severity of pain and evaluate response  - Implement non-pharmacological measures as appropriate and evaluate response  - Consider cultural and social influences on pain and pain management  - Notify physician/advanced practitioner if interventions unsuccessful or patient reports new pain  Outcome: Progressing     Problem: INFECTION - ADULT  Goal: Absence or prevention of progression during hospitalization  Description: INTERVENTIONS:  - Assess and monitor for signs and symptoms of infection  - Monitor lab/diagnostic results  - Monitor all insertion sites, i.e. indwelling lines, tubes, and drains  - Monitor endotracheal if appropriate and nasal secretions for changes in amount and color  - [place] appropriate cooling/warming therapies per order  - Administer medications as ordered  - Instruct and encourage patient and family to  use good hand hygiene technique  - Identify and instruct in appropriate isolation precautions for identified infection/condition  Outcome: Progressing  Goal: Absence of fever/infection during neutropenic period  Description: INTERVENTIONS:  - Monitor WBC    Outcome: Progressing     Problem: SAFETY ADULT  Goal: Patient will remain free of falls  Description: INTERVENTIONS:  - Educate patient/family on patient safety including physical limitations  - Instruct patient to call for assistance with activity   - Consult OT/PT to assist with strengthening/mobility   - Keep Call bell within reach  - Keep bed low and locked with side rails adjusted as appropriate  - Keep care items and personal belongings within reach  - Initiate and maintain comfort rounds  - Make Fall Risk Sign visible to staff  - Offer Toileting every 2 Hours, in advance of need  - Initiate/Maintain bed alarm  - Obtain necessary fall risk management equipment: gripper socks and call bell  - Apply yellow socks and bracelet for high fall risk patients  - Consider moving patient to room near nurses station  Outcome: Progressing  Goal: Maintain or return to baseline ADL function  Description: INTERVENTIONS:  -  Assess patient's ability to carry out ADLs; assess patient's baseline for ADL function and identify physical deficits which impact ability to perform ADLs (bathing, care of mouth/teeth, toileting, grooming, dressing, etc.)  - Assess/evaluate cause of self-care deficits   - Assess range of motion  - Assess patient's mobility; develop plan if impaired  - Assess patient's need for assistive devices and provide as appropriate  - Encourage maximum independence but intervene and supervise when necessary  - Involve family in performance of ADLs  - Assess for home care needs following discharge   - Consider OT consult to assist with ADL evaluation and planning for discharge  - Provide patient education as appropriate  Outcome: Progressing  Goal:  Maintains/Returns to pre admission functional level  Description: INTERVENTIONS:  - Perform AM-PAC 6 Click Basic Mobility/ Daily Activity assessment daily.  - Set and communicate daily mobility goal to care team and patient/family/caregiver.   - Collaborate with rehabilitation services on mobility goals if consulted  - Perform Range of Motion 3 times a day.  - Reposition patient every 2 hours.  - Dangle patient 3 times a day  - Stand patient 3 times a day  - Ambulate patient 3 times a day  - Out of bed to chair 3 times a day   - Out of bed for meals 3 times a day  - Out of bed for toileting  - Record patient progress and toleration of activity level   Outcome: Progressing     Problem: DISCHARGE PLANNING  Goal: Discharge to home or other facility with appropriate resources  Description: INTERVENTIONS:  - Identify barriers to discharge w/patient and caregiver  - Arrange for needed discharge resources and transportation as appropriate  - Identify discharge learning needs (meds, wound care, etc.)  - Arrange for interpretive services to assist at discharge as needed  - Refer to Case Management Department for coordinating discharge planning if the patient needs post-hospital services based on physician/advanced practitioner order or complex needs related to functional status, cognitive ability, or social support system  Outcome: Progressing     Problem: Knowledge Deficit  Goal: Patient/family/caregiver demonstrates understanding of disease process, treatment plan, medications, and discharge instructions  Description: Complete learning assessment and assess knowledge base.  Interventions:  - Provide teaching at level of understanding  - Provide teaching via preferred learning methods  Outcome: Progressing     Problem: Prexisting or High Potential for Compromised Skin Integrity  Goal: Skin integrity is maintained or improved  Description: INTERVENTIONS:  - Identify patients at risk for skin breakdown  - Assess and monitor  skin integrity  - Assess and monitor nutrition and hydration status  - Monitor labs   - Assess for incontinence   - Turn and reposition patient  - Assist with mobility/ambulation  - Relieve pressure over bony prominences  - Avoid friction and shearing  - Provide appropriate hygiene as needed including keeping skin clean and dry  - Evaluate need for skin moisturizer/barrier cream  - Collaborate with interdisciplinary team   - Patient/family teaching  - Consider wound care consult   Outcome: Progressing     Problem: Nutrition/Hydration-ADULT  Goal: Nutrient/Hydration intake appropriate for improving, restoring or maintaining nutritional needs  Description: Monitor and assess patient's nutrition/hydration status for malnutrition. Collaborate with interdisciplinary team and initiate plan and interventions as ordered.  Monitor patient's weight and dietary intake as ordered or per policy. Utilize nutrition screening tool and intervene as necessary. Determine patient's food preferences and provide high-protein, high-caloric foods as appropriate.     INTERVENTIONS:  - Monitor oral intake, urinary output, labs, and treatment plans  - Assess nutrition and hydration status and recommend course of action  - Evaluate amount of meals eaten  - Assist patient with eating if necessary   - Allow adequate time for meals  - Recommend/ encourage appropriate diets, oral nutritional supplements, and vitamin/mineral supplements  - Order, calculate, and assess calorie counts as needed  - Recommend, monitor, and adjust tube feedings and TPN/PPN based on assessed needs  - Assess need for intravenous fluids  - Provide specific nutrition/hydration education as appropriate  - Include patient/family/caregiver in decisions related to nutrition  Outcome: Progressing

## 2024-10-13 NOTE — ASSESSMENT & PLAN NOTE
Lab Results   Component Value Date    HGBA1C 7.4 (H) 10/12/2024     Recent Labs     10/12/24  1605 10/12/24  2129 10/13/24  0658 10/13/24  1053   POCGLU 210* 143* 157* 198*   Carb controlled diet  Sliding scale insulin

## 2024-10-14 ENCOUNTER — TELEPHONE (OUTPATIENT)
Dept: OTHER | Facility: HOSPITAL | Age: 79
End: 2024-10-14

## 2024-10-14 LAB
ANION GAP SERPL CALCULATED.3IONS-SCNC: 9 MMOL/L (ref 4–13)
BUN SERPL-MCNC: 25 MG/DL (ref 5–25)
CALCIUM SERPL-MCNC: 9.7 MG/DL (ref 8.4–10.2)
CHLORIDE SERPL-SCNC: 92 MMOL/L (ref 96–108)
CO2 SERPL-SCNC: 34 MMOL/L (ref 21–32)
CREAT SERPL-MCNC: 1.33 MG/DL (ref 0.6–1.3)
GFR SERPL CREATININE-BSD FRML MDRD: 37 ML/MIN/1.73SQ M
GLUCOSE SERPL-MCNC: 164 MG/DL (ref 65–140)
GLUCOSE SERPL-MCNC: 184 MG/DL (ref 65–140)
GLUCOSE SERPL-MCNC: 189 MG/DL (ref 65–140)
GLUCOSE SERPL-MCNC: 195 MG/DL (ref 65–140)
GLUCOSE SERPL-MCNC: 275 MG/DL (ref 65–140)
MRSA NOSE QL CULT: NORMAL
POTASSIUM SERPL-SCNC: 4.1 MMOL/L (ref 3.5–5.3)
SODIUM SERPL-SCNC: 135 MMOL/L (ref 135–147)

## 2024-10-14 PROCEDURE — 82948 REAGENT STRIP/BLOOD GLUCOSE: CPT

## 2024-10-14 PROCEDURE — 99232 SBSQ HOSP IP/OBS MODERATE 35: CPT | Performed by: STUDENT IN AN ORGANIZED HEALTH CARE EDUCATION/TRAINING PROGRAM

## 2024-10-14 PROCEDURE — 99222 1ST HOSP IP/OBS MODERATE 55: CPT | Performed by: UROLOGY

## 2024-10-14 PROCEDURE — 80048 BASIC METABOLIC PNL TOTAL CA: CPT | Performed by: PHYSICIAN ASSISTANT

## 2024-10-14 RX ORDER — HYDROMORPHONE HCL IN WATER/PF 6 MG/30 ML
0.2 PATIENT CONTROLLED ANALGESIA SYRINGE INTRAVENOUS EVERY 4 HOURS PRN
Status: DISCONTINUED | OUTPATIENT
Start: 2024-10-14 | End: 2024-10-14

## 2024-10-14 RX ORDER — HYDROMORPHONE HCL IN WATER/PF 6 MG/30 ML
0.2 PATIENT CONTROLLED ANALGESIA SYRINGE INTRAVENOUS EVERY 6 HOURS PRN
Status: DISCONTINUED | OUTPATIENT
Start: 2024-10-14 | End: 2024-10-16

## 2024-10-14 RX ORDER — FUROSEMIDE 10 MG/ML
60 INJECTION INTRAMUSCULAR; INTRAVENOUS
Status: DISCONTINUED | OUTPATIENT
Start: 2024-10-14 | End: 2024-10-15

## 2024-10-14 RX ORDER — ACETAMINOPHEN 325 MG/1
975 TABLET ORAL EVERY 8 HOURS SCHEDULED
Status: DISCONTINUED | OUTPATIENT
Start: 2024-10-14 | End: 2024-10-19 | Stop reason: HOSPADM

## 2024-10-14 RX ADMIN — ACETAMINOPHEN 975 MG: 325 TABLET, FILM COATED ORAL at 21:25

## 2024-10-14 RX ADMIN — MORPHINE SULFATE 2 MG: 2 INJECTION, SOLUTION INTRAMUSCULAR; INTRAVENOUS at 23:31

## 2024-10-14 RX ADMIN — BACLOFEN 10 MG: 10 TABLET ORAL at 13:27

## 2024-10-14 RX ADMIN — FERROUS SULFATE TAB 325 MG (65 MG ELEMENTAL FE) 325 MG: 325 (65 FE) TAB at 15:39

## 2024-10-14 RX ADMIN — LEVOTHYROXINE SODIUM 37.5 MCG: 75 TABLET ORAL at 05:01

## 2024-10-14 RX ADMIN — LIDOCAINE 1 PATCH: 700 PATCH TOPICAL at 08:37

## 2024-10-14 RX ADMIN — MORPHINE SULFATE 2 MG: 2 INJECTION, SOLUTION INTRAMUSCULAR; INTRAVENOUS at 11:53

## 2024-10-14 RX ADMIN — Medication 400 MG: at 08:37

## 2024-10-14 RX ADMIN — ACETAMINOPHEN 650 MG: 325 TABLET, FILM COATED ORAL at 05:00

## 2024-10-14 RX ADMIN — Medication 400 MG: at 21:25

## 2024-10-14 RX ADMIN — SENNOSIDES AND DOCUSATE SODIUM 1 TABLET: 8.6; 5 TABLET ORAL at 16:52

## 2024-10-14 RX ADMIN — HEPARIN SODIUM 5000 UNITS: 5000 INJECTION INTRAVENOUS; SUBCUTANEOUS at 21:26

## 2024-10-14 RX ADMIN — HEPARIN SODIUM 5000 UNITS: 5000 INJECTION INTRAVENOUS; SUBCUTANEOUS at 13:19

## 2024-10-14 RX ADMIN — MORPHINE SULFATE 2 MG: 2 INJECTION, SOLUTION INTRAMUSCULAR; INTRAVENOUS at 15:34

## 2024-10-14 RX ADMIN — OXYCODONE HYDROCHLORIDE 10 MG: 10 TABLET ORAL at 08:46

## 2024-10-14 RX ADMIN — SENNOSIDES AND DOCUSATE SODIUM 1 TABLET: 8.6; 5 TABLET ORAL at 08:37

## 2024-10-14 RX ADMIN — FUROSEMIDE 60 MG: 10 INJECTION, SOLUTION INTRAVENOUS at 08:46

## 2024-10-14 RX ADMIN — HYDROMORPHONE HYDROCHLORIDE 0.2 MG: 0.2 INJECTION, SOLUTION INTRAMUSCULAR; INTRAVENOUS; SUBCUTANEOUS at 17:45

## 2024-10-14 RX ADMIN — BACLOFEN 10 MG: 10 TABLET ORAL at 21:38

## 2024-10-14 RX ADMIN — MORPHINE SULFATE 2 MG: 2 INJECTION, SOLUTION INTRAMUSCULAR; INTRAVENOUS at 01:17

## 2024-10-14 RX ADMIN — ACETAMINOPHEN 975 MG: 325 TABLET, FILM COATED ORAL at 13:19

## 2024-10-14 RX ADMIN — ATORVASTATIN CALCIUM 40 MG: 40 TABLET, FILM COATED ORAL at 08:37

## 2024-10-14 RX ADMIN — CIPROFLOXACIN 500 MG: 500 TABLET ORAL at 11:54

## 2024-10-14 RX ADMIN — HEPARIN SODIUM 5000 UNITS: 5000 INJECTION INTRAVENOUS; SUBCUTANEOUS at 05:01

## 2024-10-14 RX ADMIN — METOPROLOL SUCCINATE 50 MG: 50 TABLET, EXTENDED RELEASE ORAL at 08:37

## 2024-10-14 RX ADMIN — ASPIRIN 81 MG CHEWABLE TABLET 81 MG: 81 TABLET CHEWABLE at 08:37

## 2024-10-14 RX ADMIN — MELATONIN 6 MG: 3 TAB ORAL at 21:25

## 2024-10-14 RX ADMIN — INSULIN LISPRO 1 UNITS: 100 INJECTION, SOLUTION INTRAVENOUS; SUBCUTANEOUS at 08:35

## 2024-10-14 RX ADMIN — METOPROLOL SUCCINATE 50 MG: 50 TABLET, EXTENDED RELEASE ORAL at 21:25

## 2024-10-14 RX ADMIN — FUROSEMIDE 60 MG: 10 INJECTION, SOLUTION INTRAVENOUS at 15:38

## 2024-10-14 RX ADMIN — POLYETHYLENE GLYCOL 3350 17 G: 17 POWDER, FOR SOLUTION ORAL at 08:37

## 2024-10-14 RX ADMIN — INSULIN LISPRO 1 UNITS: 100 INJECTION, SOLUTION INTRAVENOUS; SUBCUTANEOUS at 16:51

## 2024-10-14 RX ADMIN — PANTOPRAZOLE SODIUM 40 MG: 40 TABLET, DELAYED RELEASE ORAL at 05:01

## 2024-10-14 RX ADMIN — LATANOPROST 1 DROP: 50 SOLUTION OPHTHALMIC at 21:26

## 2024-10-14 RX ADMIN — INSULIN LISPRO 3 UNITS: 100 INJECTION, SOLUTION INTRAVENOUS; SUBCUTANEOUS at 21:26

## 2024-10-14 RX ADMIN — INSULIN LISPRO 1 UNITS: 100 INJECTION, SOLUTION INTRAVENOUS; SUBCUTANEOUS at 11:53

## 2024-10-14 NOTE — CONSULTS
Inpatient consult to Urology  Consult performed by: NANY Brown  Consult ordered by: Paco Carmona MD      : UROLOGY  Mattie Varela 79 y.o. female [id number]   Unit/Bed #: E5 -01  Encounter: [id number]        Assessment  & Plan  :    Acute low back pain  Assessment & Plan  Patient complaining of severe worsening bilateral lower back pain. CT imaging in the ER was negative for any acute findings. Of note, bladder was distended on CT. Per ER documentation patient had documented PVR of 68 mL, but 1 hour later was straight catheterized for 500 mL.   Patient reports she is incontinent at baseline and has only been voiding small amounts. Nursing has been using a Purewick to collect urine. Patient reports she last voided about 15 minutes prior to my exam.   Post-void residual measured at 713 mL.       Plan:  I suspect patients back pain is likely due to large volume urinary retention following recent cystoscopy injection of botox on 10/02/2024.  Nursing instructed to place ahn catheter and maintain upon discharge. Our office will coordinate void trial. There is a good chance patient may be catheter dependent for undetermined amount of time until the effects of botox wears off.  Cannot entirely exclude the possibility of acute cystitis to account for her retention as well.  Patient should hopefully have immediate relief of her bilateral back pain following bladder decompression with Ahn catheter.  Should also see continued improvement in her renal function as well.  Continue ciprofloxacin as prescribed.  Low concern for pyelonephritis given lack of fevers, negative leukocytosis, and negative CT imaging.    Acute cystitis  Assessment & Plan  Cannot exclude possible cause of acute urinary tension in combination with Botox.  Continue with ciprofloxacin as prescribed.  Low concern for pyelonephritis given lack of fevers, leukocytosis, and negative CT imaging    Overactive bladder  Assessment & Plan  Status  post cystoscopy injection of Botox 200 international units on 10/2/2024.  This is likely the cause of her acute urinary retention.  Maintain Bateman catheter upon discharge.  Our office will call to schedule void trial.         Urology will sign off but remain available for any further inpatient needs. Please feel free to contact the provider currently covering the Urology [facility] role for this campus with questions or concerns.            Subjective : Patient complaining of severe bilateral lower back pain.  She states that pain worsens with any movement.  She is not able to roll on her side for me to assess for CVA tenderness.  No where she able to sit upright long enough.  She does have palpable tenderness bilaterally across lower back.  Abdomen is soft, nondistended, and nontender.    Mattie Varela  is a 79 y.o. female with PMHx significant for CHF, HTN, CAD, CKD, CVA who originally presented for chest pain on 10/11/2024. Urology was consulted at patient request due to persistent lower back pain which started after she received Botox on 10/02/2024. Patient has a long standing history of urinary frequency and mixed incontinence.  She failed multiple oral therapies in the past.  She received her first injection of Botox on 6/18/2024 for 100 internation units with plans to titrate up to 200 units in 4 months. She is status post cystoscopy injection of Botox 200 international units on 10/02/2024.  She reports that a few days following Botox she started with increased urinary frequency, incontinence, and dysuria.  The nursing facility she resides at started her on ciprofloxacin 500 mg twice daily for UTI like symptoms 10/7/2024 for 7-day course.        She continues to complain of severe bilateral lower back pain. CT lumbar spine on 10/13 showed no acute changes. CT chest abdomen and pelvis without in the ED showed no hydronephrosis or shadowing calculi bilaterally. Bladder was distended with air-filled level  should be correlated with urinalysis and any recent instrumentation. According to ER documentation, patient was bladder scanned with residual volume of 63 mL. Approximately 1 hour later, patient was straight catheterized for 500 mL.     Patients states that she has been incontinent of urine despite receiving botox. She urinates a small amount and nursing has been using a Purewick to collect urine. She denies any fevers, chills, gross hematuria, or persistent dysuria. Her only complaint is severe lower back pain that worsens with any movement.                   Allergies   Allergen Reactions    Penicillins Anaphylaxis, Hives and Other (See Comments)     Other reaction(s): Unknown Reaction    Sulfa Antibiotics Anaphylaxis and Other (See Comments)     Other reaction(s): Unknown Reaction    Aspartame - Food Allergy Other (See Comments) and Hypertension     Slurred speech, weakness, stroke sx    Iodinated Contrast Media Hives     Pt has taken prep prior for contrast and has not had any break through reaction    Keflex [Cephalexin] Hives      Current Outpatient Medications   Medication Instructions    albuterol (PROVENTIL HFA,VENTOLIN HFA) 90 mcg/act inhaler 2 puffs, Inhalation, Every 6 hours PRN    aspirin 81 mg, Oral, Daily    atorvastatin (LIPITOR) 40 mg tablet TAKE ONE TABLET BY MOUTH ONCE DAILY    baclofen 10 mg, Oral, 2 times daily PRN    Blood Glucose Monitoring Suppl (OneTouch Verio Reflect) w/Device KIT Check blood sugars twice daily. Please substitute with appropriate alternative as covered by patient's insurance. Dx: E11.65    ciprofloxacin (CIPRO) 500 mg, Oral, Every 12 hours scheduled    docusate sodium (COLACE) 100 mg, Oral, 2 times daily    ferrous sulfate 325 mg, Oral, Daily with breakfast    glucose blood (OneTouch Verio) test strip Check blood sugars twice daily. Please substitute with appropriate alternative as covered by patient's insurance. Dx: E11.65    latanoprost (XALATAN) 0.005 % ophthalmic  solution 1 drop, Both Eyes, Daily at bedtime    levothyroxine 37.5 mcg, Oral, Daily (early morning)    MAGNESIUM OXIDE 400  mg, Oral, 2 times daily    meclizine (ANTIVERT) 25 mg, Oral, 4 times daily PRN    metFORMIN (GLUCOPHAGE) 1,000 mg, Oral, 2 times daily with meals    metoprolol succinate (TOPROL-XL) 50 mg, Oral, Every 12 hours scheduled    OneTouch Delica Lancets 33G MISC Check blood sugars twice daily. Please substitute with appropriate alternative as covered by patient's insurance. Dx: E11.65    oxyCODONE-acetaminophen (PERCOCET)  mg per tablet 1 tablet, Oral, Every 6 hours PRN    pantoprazole (PROTONIX) 40 mg, Oral, Daily    polyethylene glycol (MIRALAX) 17 g, Oral, Daily    Savella 100 mg, Oral, Daily    senna (SENOKOT) 17.2 mg, Oral, Daily at bedtime    torsemide (DEMADEX) 20 mg, Oral, Daily      Past Medical History:   Diagnosis Date    Acid reflux     Acute kidney injury (HCC)     Anemia     hx of iron-deficient    Anxiety     Arthritis     Asthma     last needed inhaler last year 2020    Basal cell carcinoma     upper lip    Chronic narcotic dependence (HCC)     Chronic pain     Colon polyp     Cystocele     Diabetes mellitus (HCC)     stable    Disease of thyroid gland     hypothyroidism    Diverticulosis     Dizziness     at times    Dysfunctional uterine bleeding     last assessed - [id number]    Dysphagia     Fibromyalgia     Gastric ulcer     Gastroparesis     History of colonic polyps     last assessed - [id number]    History of gastroesophageal reflux (GERD)     Hypercholesterolemia     Hyperlipidemia     Hypertension     Hyponatremia     IBS (irritable bowel syndrome)     Pneumobilia 06/18/2022    Post laminectomy syndrome     S/P insertion of spinal cord stimulator 07/18/2018    s/p Medtronic loop recorder 3/2/2024 03/02/2024    Seasonal allergies     Shortness of breath     exertional    Spinal stenosis     Status post lumbar spinal fusion 03/16/2018    Stroke (HCC)     pt states  slight stroke March 2022     Past Surgical History:   Procedure Laterality Date    APPENDECTOMY      BACK SURGERY      BREAST CYST EXCISION Left     CARDIAC CATHETERIZATION  11/14/2023    Procedure: Cardiac catheterization;  Surgeon: Randolph Holt MD;  [facility];  Service: Cardiology    CARDIAC CATHETERIZATION N/A 11/14/2023    Procedure: Cardiac Coronary Angiogram;  Surgeon: Randolph Holt MD;  [facility];  Service: Cardiology    CARDIAC ELECTROPHYSIOLOGY PROCEDURE N/A 3/2/2024    Procedure: Cardiac loop recorder implant;  Surgeon: Edu Fontaine MD;  [facility];  Service: Cardiology    CHOLECYSTECTOMY      COLONOSCOPY      ESOPHAGOGASTRODUODENOSCOPY N/A 09/28/2016    Procedure: ESOPHAGOGASTRODUODENOSCOPY (EGD);  Surgeon: Mylene Moeller MD;  Location: [facility];  Service:     HERNIA REPAIR      HYSTERECTOMY      TTAH-BSO age 28    LAMINECTOMY      LUMBAR LAMINECTOMY      OOPHORECTOMY      age 28    OK ARTHRODESIS POSTERIOR/PSTLAT TQ 1NTRSPC THORACIC N/A 06/04/2018    Procedure: Reopening of lumbar incision for T12-L5 posterior instrumented fixation and fusion and T12-L4 posterior decompression;  Surgeon: Wood Rogel MD;  Location: [facility];  Service: Neurosurgery    OK COLONOSCOPY FLX DX W/COLLJ SPEC WHEN PFRMD N/A 03/02/2016    Procedure: EGD AND COLONOSCOPY;  Surgeon: Mylene Moeller MD;  [facility];  Service: Gastroenterology    OK DILATION ESOPH UNGUIDED SOUND/BOUGIE 1/MULT PASS N/A 09/28/2016    Procedure: DILATATION ESOPHAGEAL;  Surgeon: Mylene Moeller MD;  [facility];  Service: Gastroenterology    OK ESOPHAGOGASTRODUODENOSCOPY TRANSORAL DIAGNOSTIC N/A 07/18/2016    Procedure: ESOPHAGOGASTRODUODENOSCOPY (EGD);  Surgeon: Mylene Moeller MD;  [facility];  Service: Gastroenterology    OK INSJ/RPLCMT SPINAL NPG/RCVR POCKET CRTJ&CONNJ Left 06/04/2018    Procedure: removal of left buttock implantable pulse generator and  placement of new  implantable pulse generator;  Surgeon: Wood Rogel MD;  [facility];  Service: Neurosurgery     Family History   Problem Relation Age of Onset    Lung cancer Mother 46    Pulmonary embolism Father     No Known Problems Sister     No Known Problems Daughter     No Known Problems Daughter     Stroke Maternal Grandmother     Heart attack Maternal Grandfather     No Known Problems Paternal Grandmother     No Known Problems Paternal Grandfather     No Known Problems Maternal Aunt     Diabetes Family         Diabetes mellitus    Hypertension Family     Stroke Family         Stroke complications     Social History     Socioeconomic History    Marital status:      Spouse name: None    Number of children: None    Years of education: None    Highest education level: None   Occupational History    Occupation: Retired   Tobacco Use    Smoking status: Former     Current packs/day: 0.00     Types: Cigarettes     Quit date: 1970     Years since quittin.8    Smokeless tobacco: Never    Tobacco comments:     Denied history of current ever day smoker, Former smoker and Never smoker all documented in [employer]   Vaping Use    Vaping status: Never Used   Substance and Sexual Activity    Alcohol use: Not Currently     Comment: Denied history of alcohol use    Drug use: No     Comment: Denied history of drug use    Sexual activity: Not Currently   Other Topics Concern    None   Social History Narrative    Marital History - Currently  per [employer]     Social Determinants of Health     Financial Resource Strain: Medium Risk (2022)    Overall Financial Resource Strain (CARDIA)     Difficulty of Paying Living Expenses: Somewhat hard   Food Insecurity: No Food Insecurity (6/3/2024)    Hunger Vital Sign     Worried About Running Out of Food in the Last Year: Never true     Ran Out of Food in the Last Year: Never true   Transportation Needs: No Transportation Needs (6/3/2024)    PRAPARE  - Transportation     Lack of Transportation (Medical): No     Lack of Transportation (Non-Medical): No   Physical Activity: Not on file   Stress: Not on file   Social Connections: Not on file   Intimate Partner Violence: Not on file   Housing Stability: Low Risk  (6/3/2024)    Housing Stability Vital Sign     Unable to Pay for Housing in the Last Year: No     Number of Times Moved in the Last Year: 0     Homeless in the Last Year: No        Review of Systems   Constitutional:  Negative for chills and fever.   HENT:  Negative for ear pain and sore throat.    Eyes:  Negative for pain and visual disturbance.   Respiratory:  Negative for cough and shortness of breath.    Cardiovascular:  Negative for chest pain and palpitations.   Gastrointestinal:  Negative for abdominal pain and vomiting.   Genitourinary:  Positive for flank pain, frequency and urgency. Negative for difficulty urinating, dysuria and hematuria.   Musculoskeletal:  Positive for back pain. Negative for arthralgias.   Skin:  Negative for color change and rash.   Neurological:  Negative for seizures and syncope.   All other systems reviewed and are negative.       Objective     Physical Exam  Vitals reviewed.   Constitutional:       General: She is not in acute distress.     Appearance: Normal appearance. She is normal weight. She is not ill-appearing or toxic-appearing.   HENT:      Head: Normocephalic and atraumatic.      Nose: Nose normal.   Eyes:      General: No scleral icterus.     Conjunctiva/sclera: Conjunctivae normal.   Cardiovascular:      Rate and Rhythm: Normal rate.      Pulses: Normal pulses.   Pulmonary:      Effort: Pulmonary effort is normal. No respiratory distress.   Abdominal:      General: Abdomen is flat. There is no distension.      Palpations: Abdomen is soft.      Tenderness: There is no abdominal tenderness. Right CVA tenderness: unable to assess due to patient positioning. Left CVA tenderness: unable to assess due to patient  positioning.      Hernia: No hernia is present.   Musculoskeletal:         General: Normal range of motion.      Cervical back: Normal range of motion.      Lumbar back: Tenderness present.        Back:    Skin:     General: Skin is warm and dry.   Neurological:      General: No focal deficit present.      Mental Status: She is alert and oriented to person, place, and time. Mental status is at baseline.   Psychiatric:         Mood and Affect: Mood normal.         Behavior: Behavior normal.         Thought Content: Thought content normal.         Judgment: Judgment normal.                Imaging:      10/13/2024    CT LUMBAR SPINE WITHOUT CONTRAST     INDICATION:   low back pain.     COMPARISON: 11/29/2023     TECHNIQUE:  Contiguous axial images through the lumbar spine were obtained. Sagittal and coronal reconstructions were performed.     IV Contrast:     Radiation dose length product (DLP) for this visit:  748 mGy-cm .  This examination, like all CT scans performed in the [facility], was performed utilizing techniques to minimize radiation dose exposure, including the use of iterative   reconstruction and automated exposure control.     IMAGE QUALITY: Beam-hardening artifact from spinal construct limits evaluation     FINDINGS:     ALIGNMENT:  There are 5 lumbar type vertebral bodies. Kyphotic angulation of the thoracolumbar junction redemonstrated.     VERTEBRAE:  No fracture.  No lytic or blastic lesion. Extensive postoperative changes redemonstrated with bilateral pedicle screws T12-L5. Metallic intervertebral cage at L4-5. Decompressive laminectomies L1-L4 redemonstrated     DEGENERATIVE CHANGES: Extensive beam-hardening artifact limits evaluation. Scattered moderate multilevel spondylotic changes noted. Spinal construct intact.     PARASPINAL SOFT TISSUES: Atherosclerotic calcifications noted. Cholecystectomy clips noted. Rounded calcific radiodensity versus medication measuring 1 cm in the  gastric pylorus incidentally noted.     OTHER: None.     IMPRESSION:        1. No acute fracture.  2. Extensive postoperative and spondylotic changes are similar to the prior study.            10/11/2024    CT CHEST, ABDOMEN AND PELVIS WITHOUT IV CONTRAST     INDICATION: Right-sided chest pain beginning last night.  Bilateral flank pain, diffuse abdominal pain.  SADAF on labs, states she's retaining urine..     COMPARISON: None.     TECHNIQUE: CT examination of the chest, abdomen and pelvis was performed without intravenous contrast. Multiplanar 2D reformatted images were created from the source data. Study is limited due to streak artifacts from orthopedic hardware.     This examination, like all CT scans performed in the [facility], was performed utilizing techniques to minimize radiation dose exposure, including the use of iterative reconstruction and automated exposure control. Radiation dose length   product (DLP) for this visit: 655 mGy-cm     Enteric Contrast: Not administered.     FINDINGS:     CHEST     LUNGS: No tracheal or endobronchial lesion.     A few scattered nodules less than 4 mm in size are not significantly changed and may represent small granulomas.     Right lower lobe scarring adjacent to osteophyte is similar to prior study.     PLEURA: Unremarkable.     HEART/GREAT VESSELS: Heart is unremarkable for patient's age. No thoracic aortic aneurysm. Coronary calcifications are present.     MEDIASTINUM AND AYLA: Unremarkable.     CHEST WALL AND LOWER NECK: Eggshell calcification left breast is unchanged.     ABDOMEN     LIVER/BILIARY TREE: Unremarkable.     GALLBLADDER: Post cholecystectomy.     SPLEEN: Unremarkable.     PANCREAS: Unremarkable.     ADRENAL GLANDS: Unremarkable.     KIDNEYS/URETERS: No hydronephrosis or evidence of calculi. No definite ureteral stone.     STOMACH AND BOWEL: Stomach is distended with food debris and radiopaque tablet. Small bowel is not dilated less  pronounced than the prior study. Some redundant jejunum does extend into the right upper quadrant although the SMV appears to be to the right   of the SMA. Stool scattered throughout the colon left colon diverticulosis without CT evidence of diverticulitis.     APPENDIX: No findings to suggest appendicitis.     ABDOMINOPELVIC CAVITY: No ascites. No pneumoperitoneum. No lymphadenopathy.     VESSELS: Unremarkable for patient's age.     PELVIS     REPRODUCTIVE ORGANS: Unremarkable for patient's age.     URINARY BLADDER: The bladder is distended with air-fluid level should be correlated with any recent instrumentation..     ABDOMINAL WALL/INGUINAL REGIONS: There is a right lateral hernia seen at the pelvic inlet containing fat and limited by the external oblique muscle. Small bowel loops enter the internal inguinal ring on the right although a large hernia is not seen   distally.     BONES: No acute fracture or suspicious osseous lesion. Fusion hardware is noted in the lumbar spine causing streak artifacts which limit the examination. Pedicle screws extend from the T12 level to the L5 level. Disc prosthesis is noted at L4-L5.     Stimulator device extends into the thoracic spinal canal with generator noted in the left flank region.     IMPRESSION:     No evidence of consolidation or pleural effusion.     No evidence of hydronephrosis or stone.     Bladder distention with air-fluid level should be correlated with urinalysis and any recent instrumentation. Correlation with any voiding dysfunction is advised. Bateman catheter may be useful.     No small bowel dilatation.     The study is limited due to extensive streak artifacts from orthopedic hardware.        Labs:  Lab Results   Component Value Date    SODIUM 135 10/14/2024    K 4.1 10/14/2024    CL 92 (L) 10/14/2024    CO2 34 (H) 10/14/2024    BUN 25 10/14/2024    CREATININE 1.33 (H) 10/14/2024    GLUC 184 (H) 10/14/2024    CALCIUM 9.7 10/14/2024         Lab Results    Component Value Date    WBC 8.28 10/13/2024    HGB 11.9 10/13/2024    HCT 36.9 10/13/2024    MCV 91 10/13/2024     10/13/2024              Paco AYON

## 2024-10-14 NOTE — PLAN OF CARE
Problem: Potential for Falls  Goal: Patient will remain free of falls  Description: INTERVENTIONS:  - Educate patient/family on patient safety including physical limitations  - Instruct patient to call for assistance with activity   - Consult OT/PT to assist with strengthening/mobility   - Keep Call bell within reach  - Keep bed low and locked with side rails adjusted as appropriate  - Keep care items and personal belongings within reach  - Initiate and maintain comfort rounds  - Make Fall Risk Sign visible to staff  - Offer Toileting every 2 Hours, in advance of need  - Initiate/Maintain bed alarm  - Apply yellow socks and bracelet for high fall risk patients  - Consider moving patient to room near nurses station  Outcome: Progressing     Problem: PAIN - ADULT  Goal: Verbalizes/displays adequate comfort level or baseline comfort level  Description: Interventions:  - Encourage patient to monitor pain and request assistance  - Assess pain using appropriate pain scale  - Administer analgesics based on type and severity of pain and evaluate response  - Implement non-pharmacological measures as appropriate and evaluate response  - Consider cultural and social influences on pain and pain management  - Notify physician/advanced practitioner if interventions unsuccessful or patient reports new pain  Outcome: Progressing     Problem: INFECTION - ADULT  Goal: Absence or prevention of progression during hospitalization  Description: INTERVENTIONS:  - Assess and monitor for signs and symptoms of infection  - Monitor lab/diagnostic results  - Monitor all insertion sites, i.e. indwelling lines, tubes, and drains  - Monitor endotracheal if appropriate and nasal secretions for changes in amount and color  - [place] appropriate cooling/warming therapies per order  - Administer medications as ordered  - Instruct and encourage patient and family to use good hand hygiene technique  - Identify and instruct in appropriate isolation  precautions for identified infection/condition  Outcome: Progressing  Goal: Absence of fever/infection during neutropenic period  Description: INTERVENTIONS:  - Monitor WBC    Outcome: Progressing     Problem: SAFETY ADULT  Goal: Patient will remain free of falls  Description: INTERVENTIONS:  - Educate patient/family on patient safety including physical limitations  - Instruct patient to call for assistance with activity   - Consult OT/PT to assist with strengthening/mobility   - Keep Call bell within reach  - Keep bed low and locked with side rails adjusted as appropriate  - Keep care items and personal belongings within reach  - Initiate and maintain comfort rounds  - Make Fall Risk Sign visible to staff  - Offer Toileting every 2 Hours, in advance of need  - Initiate/Maintain bed alarm  - Apply yellow socks and bracelet for high fall risk patients  - Consider moving patient to room near nurses station  Outcome: Progressing  Goal: Maintain or return to baseline ADL function  Description: INTERVENTIONS:  - Educate patient/family on patient safety including physical limitations  - Instruct patient to call for assistance with activity   - Consult OT/PT to assist with strengthening/mobility   - Keep Call bell within reach  - Keep bed low and locked with side rails adjusted as appropriate  - Keep care items and personal belongings within reach  - Initiate and maintain comfort rounds  - Make Fall Risk Sign visible to staff  - Offer Toileting every 2 Hours, in advance of need  - Initiate/Maintain bed alarm  - Apply yellow socks and bracelet for high fall risk patients  - Consider moving patient to room near nurses station  Outcome: Progressing  Goal: Maintains/Returns to pre admission functional level  Description: INTERVENTIONS:  - Perform AM-PAC 6 Click Basic Mobility/ Daily Activity assessment daily.  - Set and communicate daily mobility goal to care team and patient/family/caregiver.   - Collaborate with  rehabilitation services on mobility goals if consulted  - Out of bed for toileting  - Record patient progress and toleration of activity level   Outcome: Progressing     Problem: DISCHARGE PLANNING  Goal: Discharge to home or other facility with appropriate resources  Description: INTERVENTIONS:  - Identify barriers to discharge w/patient and caregiver  - Arrange for needed discharge resources and transportation as appropriate  - Identify discharge learning needs (meds, wound care, etc.)  - Arrange for interpretive services to assist at discharge as needed  - Refer to Case Management Department for coordinating discharge planning if the patient needs post-hospital services based on physician/advanced practitioner order or complex needs related to functional status, cognitive ability, or social support system  Outcome: Progressing     Problem: Knowledge Deficit  Goal: Patient/family/caregiver demonstrates understanding of disease process, treatment plan, medications, and discharge instructions  Description: Complete learning assessment and assess knowledge base.  Interventions:  - Provide teaching at level of understanding  - Provide teaching via preferred learning methods  Outcome: Progressing     Problem: Prexisting or High Potential for Compromised Skin Integrity  Goal: Skin integrity is maintained or improved  Description: INTERVENTIONS:  - Identify patients at risk for skin breakdown  - Assess and monitor skin integrity  - Assess and monitor nutrition and hydration status  - Monitor labs   - Assess for incontinence   - Turn and reposition patient  - Assist with mobility/ambulation  - Relieve pressure over bony prominences  - Avoid friction and shearing  - Provide appropriate hygiene as needed including keeping skin clean and dry  - Evaluate need for skin moisturizer/barrier cream  - Collaborate with interdisciplinary team   - Patient/family teaching  - Consider wound care consult   Outcome: Progressing      Problem: Nutrition/Hydration-ADULT  Goal: Nutrient/Hydration intake appropriate for improving, restoring or maintaining nutritional needs  Description: Monitor and assess patient's nutrition/hydration status for malnutrition. Collaborate with interdisciplinary team and initiate plan and interventions as ordered.  Monitor patient's weight and dietary intake as ordered or per policy. Utilize nutrition screening tool and intervene as necessary. Determine patient's food preferences and provide high-protein, high-caloric foods as appropriate.     INTERVENTIONS:  - Monitor oral intake, urinary output, labs, and treatment plans  - Assess nutrition and hydration status and recommend course of action  - Evaluate amount of meals eaten  - Assist patient with eating if necessary   - Allow adequate time for meals  - Recommend/ encourage appropriate diets, oral nutritional supplements, and vitamin/mineral supplements  - Order, calculate, and assess calorie counts as needed  - Recommend, monitor, and adjust tube feedings and TPN/PPN based on assessed needs  - Assess need for intravenous fluids  - Provide specific nutrition/hydration education as appropriate  - Include patient/family/caregiver in decisions related to nutrition  Outcome: Progressing

## 2024-10-14 NOTE — ASSESSMENT & PLAN NOTE
Status post cystoscopy injection of Botox 200 international units on 10/2/2024.  This is likely the cause of her acute urinary retention.  Maintain Bateman catheter upon discharge.  Our office will call to schedule void trial.

## 2024-10-14 NOTE — PROGRESS NOTES
Progress Note - Hospitalist   Name: Mattie Varela 79 y.o. female I MRN: [id number]  Unit/Bed#: E5 -01 I Date of Admission: 10/11/2024   Date of Service: 10/14/2024 I Hospital Day: 3    Assessment & Plan  Acute on chronic diastolic congestive heart failure (HCC)  Wt Readings from Last 3 Encounters:   10/14/24 87.6 kg (193 lb 2 oz)   10/02/24 85.5 kg (188 lb 9.6 oz)   09/26/24 86.2 kg (190 lb)     79-year-old female was admitted due to volume overload, weight gain, and edema secondary to acute on chronic diastolic congestive heart failure.  Diuretic management per cardiology.  Currently on Lasix 60 mg IV twice daily.  Daily weights, I's and O's  SADAF (acute kidney injury) (HCC)  Results from last 7 days   Lab Units 10/14/24  0457 10/13/24  0554 10/12/24  0535 10/11/24  1228   CREATININE mg/dL 1.33* 1.43* 1.50* 1.65*   Baseline 1.1-1.3.  Etiology suspected to be due to cardiorenal.  Improving.    Acute low back pain  Complaining of low back pain  No red flags.  No acute fractures and CT spine lumbar without contrast study  Continue supportive care  Degenerative lumbar spinal stenosis  Status post spinal stimulator, although daughter stated that this is no longer working.  Utilizes baclofen as needed and Percocet 10 mg 3 times daily as needed  Type 2 diabetes mellitus with other skin complications ([facility])  Lab Results   Component Value Date    HGBA1C 7.4 (H) 10/12/2024     Recent Labs     10/13/24  1053 10/13/24  1621 10/13/24  2057 10/14/24  0732   POCGLU 198* 148* 171* 164*   Continue sliding scale  Chronic pain disorder  Reviewed PDMP as well as facility paperwork  Prescribed Percocet 10-3 25 prn  Chest pain  Appreciate cardiology recommendations.  No evidence of ACS at this time.  Hypertension  Continue metoprolol succinate 50 mg BID    Hypothyroidism  Continue Synthroid  Acute cystitis  Patient was diagnosed with acute cystitis as an outpatient was started on ciprofloxacin 7-day course through  10/15/2024  Continue renal adjusted dose through 10/15/2024  Overactive bladder  Received Botox injections in the past  Urinary retention protocol  Cerebrovascular accident (CVA), unspecified mechanism (HCC)  Continue aspirin, statin  Chronic obstructive pulmonary disease, unspecified COPD type (HCC)  Not in acute exacerbation  Continue albuterol as needed    VTE Pharmacologic Prophylaxis: VTE Score: 6 Moderate Risk (Score 3-4) - Pharmacological DVT Prophylaxis Ordered: heparin.    Mobility:   Basic Mobility Inpatient Raw Score: 6  -[facility] Goal: 2: Bed activities/Dependent transfer  -HL Achieved: 2: Bed activities/Dependent transfer    Discussions with Specialists or Other Care Team Provider: cardiology    Education and Discussions with Family / Patient: patient, daughter    Current Length of Stay: 3 day(s)  Current Patient Status: Inpatient   Certification Statement: The patient will continue to require additional inpatient hospital stay due to iv diuretics  Discharge Plan: 48-72 hours    Code Status: Level 1 - Full Code    Subjective   Patient seen and examined. Continues to complain of back pain, hasn't really had a bowel movement.    Objective   Vitals:   Temp (24hrs), Av.5 °F (36.9 °C), Min:98.4 °F (36.9 °C), Max:98.7 °F (37.1 °C)    Temp:  [98.4 °F (36.9 °C)-98.7 °F (37.1 °C)] 98.7 °F (37.1 °C)  HR:  [] 90  Resp:  [15-18] 16  BP: ()/(56-71) 96/56  SpO2:  [91 %-97 %] 91 %  Body mass index is 37.72 kg/m².     Input and Output Summary (last 24 hours):     Intake/Output Summary (Last 24 hours) at 10/14/2024 1031  Last data filed at 10/14/2024 0634  Gross per 24 hour   Intake --   Output 2280 ml   Net -2280 ml       Physical Exam  Vitals reviewed.   Constitutional:       General: She is not in acute distress.  HENT:      Head: Normocephalic.      Nose: Nose normal.      Mouth/Throat:      Mouth: Mucous membranes are moist.   Eyes:      General: No scleral icterus.  Cardiovascular:      Rate and  Rhythm: Normal rate.   Pulmonary:      Effort: Pulmonary effort is normal. No respiratory distress.      Breath sounds: No wheezing.   Abdominal:      General: There is no distension.      Palpations: Abdomen is soft.      Tenderness: There is no abdominal tenderness.   Musculoskeletal:      Right lower leg: No edema.      Left lower leg: No edema.   Skin:     General: Skin is warm.   Neurological:      Mental Status: She is alert.   Psychiatric:         Mood and Affect: Mood normal.         Behavior: Behavior normal.       Lines/Drains:  Lines/Drains/Airways       Active Status       Name Placement date Placement time Site Days    External Urinary Catheter 10/11/24  2300  -- 2                    Lab Results: I have reviewed the following results:   Results from last 7 days   Lab Units 10/13/24  0554 10/12/24  0535 10/11/24  1228   WBC Thousand/uL 8.28 8.56 7.76   HEMOGLOBIN g/dL 11.9 10.8* 10.8*   PLATELETS Thousands/uL 267 265 264   MCV fL 91 90 91     Results from last 7 days   Lab Units 10/14/24  0457 10/13/24  0554 10/12/24  0535 10/11/24  1228   SODIUM mmol/L 135 138 138 136   POTASSIUM mmol/L 4.1 4.5 4.2 5.0   CHLORIDE mmol/L 92* 95* 99 99   CO2 mmol/L 34* 35* 30 30   ANION GAP mmol/L 9 8 9 7   BUN mg/dL 25 28* 33* 29*   CREATININE mg/dL 1.33* 1.43* 1.50* 1.65*   CALCIUM mg/dL 9.7 9.5 9.1 9.4   ALBUMIN g/dL  --   --   --  3.8   TOTAL BILIRUBIN mg/dL  --   --   --  0.34   ALK PHOS U/L  --   --   --  123*   ALT U/L  --   --   --  7   AST U/L  --   --   --  17   EGFR ml/min/1.73sq m 37 34 32 28   GLUCOSE RANDOM mg/dL 184* 213* 127 128     Results from last 7 days   Lab Units 10/12/24  0535   MAGNESIUM mg/dL 2.2         Results from last 7 days   Lab Units 10/11/24  1455 10/11/24  1228   HS TNI 0HR ng/L  --  10   HS TNI 2HR ng/L 8  --               Results from last 7 days   Lab Units 10/14/24  0732 10/13/24  2057 10/13/24  1621 10/13/24  1053 10/13/24  0658 10/12/24  2129 10/12/24  1605 10/12/24  1112  10/12/24  0729 10/11/24  2059   POC GLUCOSE mg/dl 164* 171* 148* 198* 157* 143* 210* 244* 135 124     Results from last 7 days   Lab Units 10/12/24  0535   HEMOGLOBIN A1C % 7.4*           Recent Cultures (last 7 days):         Imaging:  Reviewed radiology reports from this admission: xr chest, ct cap    Last 24 Hours Medication List:     Current Facility-Administered Medications:     acetaminophen (TYLENOL) tablet 650 mg, Q6H PRN    albuterol (PROVENTIL HFA,VENTOLIN HFA) inhaler 2 puff, Q6H PRN    aluminum-magnesium hydroxide-simethicone (MAALOX) oral suspension 30 mL, Q6H PRN    aspirin chewable tablet 81 mg, Daily    atorvastatin (LIPITOR) tablet 40 mg, Daily    baclofen tablet 10 mg, BID PRN    ciprofloxacin (CIPRO) tablet 500 mg, Q24H    ferrous sulfate tablet 325 mg, Daily With Dinner    furosemide (LASIX) injection 60 mg, BID (diuretic)    heparin (porcine) subcutaneous injection 5,000 Units, Q8H ARY    insulin lispro (HumALOG/ADMELOG) 100 units/mL subcutaneous injection 1-5 Units, 4x Daily (AC & HS) **AND** Fingerstick Glucose (POCT), 4x Daily AC and at bedtime    latanoprost (XALATAN) 0.005 % ophthalmic solution 1 drop, HS    levothyroxine tablet 37.5 mcg, Early Morning    lidocaine (LIDODERM) 5 % patch 1 patch, Daily    magnesium Oxide (MAG-OX) tablet 400 mg, BID    meclizine (ANTIVERT) tablet 25 mg, Daily PRN    melatonin tablet 6 mg, HS    metoprolol succinate (TOPROL-XL) 24 hr tablet 50 mg, Q12H ARY    [Held by provider] Milnacipran HCl TABS 100 mg, Daily    morphine injection 2 mg, Q4H PRN    ondansetron (ZOFRAN) injection 4 mg, Q6H PRN    oxyCODONE (ROXICODONE) immediate release tablet 10 mg, TID PRN    pantoprazole (PROTONIX) EC tablet 40 mg, Daily Before Breakfast    polyethylene glycol (MIRALAX) packet 17 g, Daily    senna-docusate sodium (SENOKOT S) 8.6-50 mg per tablet 1 tablet, BID    zolpidem (AMBIEN) tablet 5 mg, HS PRN      **Please Note: This note may have been constructed using a voice  recognition system.**

## 2024-10-14 NOTE — ASSESSMENT & PLAN NOTE
Complaining of low back pain  No red flags.  No acute fractures and CT spine lumbar without contrast study  Continue supportive care

## 2024-10-14 NOTE — ASSESSMENT & PLAN NOTE
Wt Readings from Last 3 Encounters:   10/14/24 87.6 kg (193 lb 2 oz)   10/02/24 85.5 kg (188 lb 9.6 oz)   09/26/24 86.2 kg (190 lb)     79-year-old female was admitted due to volume overload, weight gain, and edema secondary to acute on chronic diastolic congestive heart failure.  Diuretic management per cardiology.  Currently on Lasix 60 mg IV twice daily.  Daily weights, I's and O's

## 2024-10-14 NOTE — UTILIZATION REVIEW
Initial Clinical Review    Admission: Date/Time/Statement:   Admission Orders (From admission, onward)       Ordered        10/11/24 1646  INPATIENT ADMISSION  Once                          Orders Placed This Encounter   Procedures    INPATIENT ADMISSION     Standing Status:   Standing     Number of Occurrences:   1     Order Specific Question:   Level of Care     Answer:   Med Surg [16]     Order Specific Question:   Estimated length of stay     Answer:   More than 2 Midnights     Order Specific Question:   Certification     Answer:   I certify that inpatient services are medically necessary for this patient for a duration of greater than two midnights. See H&P and MD Progress Notes for additional information about the patient's course of treatment.     ED Arrival Information       Expected   -    Arrival   10/11/2024 12:09    Acuity   Emergent              Means of arrival   Ambulance    Escorted by   [facility] ([facility])    Service   Hospitalist    Admission type   Emergency              Arrival complaint   chest pain             Chief Complaint   Patient presents with    Chest Pain     Pt reports Mid sternal and Radiates to right side that began last night. Pt reports weakness. Pt states she's being treated for kidney infection and bilateral flank pain.        Initial Presentation: 79 y.o. female presents to the ED via EMS from home with c/o midsternal stabbing CP w/ r/tl R side since evening before admit.  Recently tx with Cipro 500 mg BID for UTI.  PMH: chronic dHF, HTN, CAD, CKD, CVA, spinal stenosis, NIDDM, chronic pain, OAB, COPD, .   In the ED VS stable, rated pain 5/10.  Labs - elevated BUN/Cr, alk phos, Hgb 10.8.  Imaging - no acute CP disease, Bladder distention with air-fluid level, no acute L spine fx, Extensive postoperative and spondylotic changes.  ECG - NSR.  Treated with 500 cc IV fluid bolus, Percocet x 1.  On exam residual CP, some SOB, BLE edema, R flank pain, decreased breath  sounds bilat.  Admitted to INPATIENT status with Acute on chronic dHF, L spine stenosis,  SADAF, chest pain, acute cystitis - IV Lasix 60 mg BID, ACS r/o, daily wt, cardio consult, SSI cover, trend BMP, tele, continue Cipro through 10/15, therapy evals.     Anticipated Length of Stay/Certification Statement: Patient will be admitted on an Inpatient basis with an anticipated length of stay of greater than 2 midnights.   Justification for Hospital Stay: Need for IV diuretics for management of acute heart failure, ACS rule out     Date: 10/12   Day 2:   Acute on chronic dHF, L spine stenosis,  SADAF, chest pain, acute cystitis  - noted spike in glucose, may need long acting insulin, continue Metoprolol, continues on Cipro,   On exam slow improvement in BLE swelling, back pain worsened by the bed.     10/12 Cardio Consult - Acute on chronic dHF, precordial CP, HTN - no evidence ACS, unclear etiology of CP, likely noncardiac, BLE edema, wt increase, OP diuretic change and increase to Torsemide, continue IV Lasix 60 mg BID.  On exam BLE 2+ pitting edema.      Date: 10/13  Day 3: Has surpassed a 2nd midnight with active treatments and services.  Acute on chronic dHF, L spine stenosis,  SADAF, chest pain, acute cystitis - continues on IV Lasix BID, PO Cipro, volume status improving. Had 3.4 L diuresed yesterday, BUN/CR improving. C/o severe back pain, no CP or any other complaints.  BLE edema 1-2+, tenderness in MSK. Worked with PT - mobility score 6/16 will need post d/c rehab likely.  Pt has spinal cord stimulator that no longer works, will add IV Morphine PRN for severe back pain.      ED Treatment-Medication Administration from 10/11/2024 1209 to 10/11/2024 1716         Date/Time Order Dose Route Action     10/11/2024 1451 sodium chloride 0.9 % bolus 500 mL 500 mL Intravenous New Bag     10/11/2024 1448 oxyCODONE-acetaminophen (PERCOCET) 5-325 mg per tablet 2 tablet 2 tablet Oral Given            Scheduled  Medications:  acetaminophen, 975 mg, Oral, Q8H ARY  aspirin, 81 mg, Oral, Daily  atorvastatin, 40 mg, Oral, Daily  ciprofloxacin, 500 mg, Oral, Q24H  ferrous sulfate, 325 mg, Oral, Daily With Dinner  furosemide, 60 mg, Intravenous, BID (diuretic)  heparin (porcine), 5,000 Units, Subcutaneous, Q8H ARY  insulin lispro, 1-5 Units, Subcutaneous, 4x Daily (AC & HS)  latanoprost, 1 drop, Both Eyes, HS  levothyroxine, 37.5 mcg, Oral, Early Morning  lidocaine, 1 patch, Topical, Daily  magnesium Oxide, 400 mg, Oral, BID  melatonin, 6 mg, Oral, HS  metoprolol succinate, 50 mg, Oral, Q12H ARY  [Held by provider] Milnacipran HCl, 1 tablet, Oral, Daily  pantoprazole, 40 mg, Oral, Daily Before Breakfast  polyethylene glycol, 17 g, Oral, Daily  senna-docusate sodium, 1 tablet, Oral, BID      Continuous IV Infusions:     PRN Meds:  albuterol, 2 puff, Inhalation, Q6H PRN  aluminum-magnesium hydroxide-simethicone, 30 mL, Oral, Q6H PRN  baclofen, 10 mg, Oral, BID PRN - x 1 10/11, 10/12, 10/13  meclizine, 25 mg, Oral, Daily PRN  morphine injection, 2 mg, Intravenous, Q4H PRN - x 1 10/13, x 2 10/14  ondansetron, 4 mg, Intravenous, Q6H PRN  oxyCODONE, 10 mg, Oral, TID PRN - x 1 10/11, x 2 10/12, x 3 10/13 and dc/   zolpidem, 5 mg, Oral, HS PRN - x 1 10/13  Tylenol 650 mg q 6 hr PRN - x 3 10/12, x 2 10/13, x 1 10/14 and d/c      ED Triage Vitals   Temperature Pulse Respirations Blood Pressure SpO2 Pain Score   10/11/24 1214 10/11/24 1214 10/11/24 1400 10/11/24 1214 10/11/24 1214 10/11/24 1214   98 °F (36.7 °C) 65 16 134/65 95 % 5     Weight (last 2 days)       Date/Time Weight    10/14/24 0536 87.6 (193.12)    10/13/24 0548 86.1 (189.82)    10/12/24 0545 86.1 (189.82)            Vital Signs (last 3 days)       Date/Time Temp Pulse Resp BP MAP (mmHg) SpO2 O2 Device Patient Position - Orthostatic VS Pain    10/14/24 1153 -- -- -- -- -- -- -- -- 10 - Worst Possible Pain    10/14/24 0846 -- -- -- -- -- -- -- -- 7    10/14/24 0832 -- -- -- --  -- -- None (Room air) -- --    10/14/24 07:29:05 98.7 °F (37.1 °C) 90 16 96/56 69 91 % None (Room air) Lying --    10/14/24 0500 -- -- -- -- -- -- -- -- 8    10/14/24 0117 -- -- -- -- -- -- -- -- 10 - Worst Possible Pain    10/13/24 23:16:15 98.4 °F (36.9 °C) 100 18 111/64 80 94 % -- -- --    10/13/24 2100 -- -- -- -- -- 97 % None (Room air) -- --    10/13/24 20:59:57 -- 98 18 148/71 97 97 % -- -- --    10/13/24 2029 -- -- -- -- -- -- -- -- No Pain    10/13/24 1805 -- -- -- -- -- -- -- -- 10 - Worst Possible Pain    10/13/24 15:12:42 98.4 °F (36.9 °C) 92 15 133/66 88 95 % -- -- --    10/13/24 1418 -- -- -- -- -- -- -- -- 10 - Worst Possible Pain    10/13/24 1032 -- -- -- -- -- -- -- -- 10 - Worst Possible Pain    10/13/24 1010 -- -- -- -- -- -- -- -- 10 - Worst Possible Pain    10/13/24 0956 -- -- -- -- -- -- -- -- 10 - Worst Possible Pain    10/13/24 06:54:36 98.1 °F (36.7 °C) 83 16 137/74 95 93 % -- Lying --    10/13/24 0612 -- -- -- -- -- -- -- -- 4    10/13/24 0334 -- -- -- -- -- -- -- -- 8    10/12/24 22:18:51 98 °F (36.7 °C) 88 18 122/75 91 99 % -- -- --    10/12/24 2143 -- 86 -- 137/74 -- -- -- -- --    10/12/24 2021 -- -- -- -- -- -- -- -- 6    10/12/24 1638 -- -- -- -- -- -- -- -- 9    10/12/24 1614 -- -- -- -- -- -- -- -- 8    10/12/24 15:03:18 98.4 °F (36.9 °C) 77 18 134/65 88 99 % -- -- --    10/12/24 0954 -- -- -- -- -- -- -- -- 9    10/12/24 07:26:31 98.6 °F (37 °C) 82 16 121/62 82 97 % None (Room air) Lying --    10/12/24 00:05:43 98.5 °F (36.9 °C) 87 16 131/70 90 98 % -- -- --    10/11/24 2207 -- -- -- -- -- -- -- -- 9    10/11/24 21:47:13 -- 92 -- 122/78 93 97 % -- -- --    10/11/24 19:50:43 -- 83 -- 112/73 86 96 % -- -- No Pain    10/11/24 1750 -- -- -- -- -- -- -- -- No Pain    10/11/24 17:24:44 97.9 °F (36.6 °C) 84 -- 117/78 91 96 % -- -- --    10/11/24 17:24:37 97.9 °F (36.6 °C) 84 -- 117/78 91 96 % -- -- --    10/11/24 1700 -- 85 20 165/60 87 97 % None (Room air) Lying --    10/11/24 1600 -- 81  16 148/65 94 90 % -- -- --    10/11/24 1532 -- -- -- -- -- -- -- -- 6    10/11/24 1529 -- -- -- -- -- -- -- -- 7    10/11/24 1520 -- 81 16 120/83 -- 96 % None (Room air) Lying --    10/11/24 1448 -- -- -- -- -- -- -- -- 9    10/11/24 1400 -- 78 16 147/66 95 96 % None (Room air) Lying 10 - Worst Possible Pain    10/11/24 1245 -- -- -- -- -- -- None (Room air) -- --    10/11/24 1214 98 °F (36.7 °C) 65 -- 134/65 -- 95 % None (Room air) Lying 5              Pertinent Labs/Diagnostic Test Results:   Radiology:  CT spine lumbar wo contrast   Final Interpretation by Natan Sena MD (10/13 1608)         1. No acute fracture.   2. Extensive postoperative and spondylotic changes are similar to the prior study.      CT chest abdomen pelvis wo contrast   Final Interpretation by Liam Potts MD (10/11 8484)      No evidence of consolidation or pleural effusion.      No evidence of hydronephrosis or stone.      Bladder distention with air-fluid level should be correlated with urinalysis and any recent instrumentation. Correlation with any voiding dysfunction is advised. Bateman catheter may be useful.      No small bowel dilatation.      XR chest 1 view portable   Final Interpretation by Natan Sena MD (10/11 7053)      No acute cardiopulmonary disease.     Cardiology:    10/11 ECG - Normal sinus rhythm  Left axis deviation  Non-specific intra-ventricular conduction block  Cannot rule out Anterior infarct , age undetermined  Abnormal ECG  GI:  No orders to display           Results from last 7 days   Lab Units 10/13/24  0554 10/12/24  0535 10/11/24  1228   WBC Thousand/uL 8.28 8.56 7.76   HEMOGLOBIN g/dL 11.9 10.8* 10.8*   HEMATOCRIT % 36.9 33.4* 34.5*   PLATELETS Thousands/uL 267 265 264   TOTAL NEUT ABS Thousands/µL  --   --  3.97         Results from last 7 days   Lab Units 10/14/24  0457 10/13/24  0554 10/12/24  0535 10/11/24  1228   SODIUM mmol/L 135 138 138 136   POTASSIUM mmol/L 4.1 4.5 4.2 5.0   CHLORIDE  mmol/L 92* 95* 99 99   CO2 mmol/L 34* 35* 30 30   ANION GAP mmol/L 9 8 9 7   BUN mg/dL 25 28* 33* 29*   CREATININE mg/dL 1.33* 1.43* 1.50* 1.65*   EGFR ml/min/1.73sq m 37 34 32 28   CALCIUM mg/dL 9.7 9.5 9.1 9.4   MAGNESIUM mg/dL  --   --  2.2  --      Results from last 7 days   Lab Units 10/11/24  1228   AST U/L 17   ALT U/L 7   ALK PHOS U/L 123*   TOTAL PROTEIN g/dL 6.7   ALBUMIN g/dL 3.8   TOTAL BILIRUBIN mg/dL 0.34     Results from last 7 days   Lab Units 10/14/24  1141 10/14/24  0732 10/13/24  2057 10/13/24  1621 10/13/24  1053 10/13/24  0658 10/12/24  2129 10/12/24  1605 10/12/24  1112 10/12/24  0729 10/11/24  2059   POC GLUCOSE mg/dl 195* 164* 171* 148* 198* 157* 143* 210* 244* 135 124     Results from last 7 days   Lab Units 10/14/24  0457 10/13/24  0554 10/12/24  0535 10/11/24  1228   GLUCOSE RANDOM mg/dL 184* 213* 127 128         Results from last 7 days   Lab Units 10/12/24  0535   HEMOGLOBIN A1C % 7.4*   EAG mg/dl 166     Results from last 7 days   Lab Units 10/11/24  1455 10/11/24  1228   HS TNI 0HR ng/L  --  10   HS TNI 2HR ng/L 8  --    HSTNI D2 ng/L -2  --      Results from last 7 days   Lab Units 10/11/24  1228   BNP pg/mL 55     Results from last 7 days   Lab Units 10/11/24  1228   LIPASE u/L 42                 Results from last 7 days   Lab Units 10/11/24  1507   CLARITY UA  Clear   COLOR UA  Light Yellow   SPEC GRAV UA  1.010   PH UA  6.0   GLUCOSE UA mg/dl Negative   KETONES UA mg/dl Negative   BLOOD UA  Negative   PROTEIN UA mg/dl Negative   NITRITE UA  Negative   BILIRUBIN UA  Negative   UROBILINOGEN UA (BE) mg/dl <2.0   LEUKOCYTES UA  Negative         Past Medical History:   Diagnosis Date    Acid reflux     Acute kidney injury (HCC)     Anemia     hx of iron-deficient    Anxiety     Arthritis     Asthma     last needed inhaler last year 2020    Basal cell carcinoma     upper lip    Chronic narcotic dependence (HCC)     Chronic pain     Colon polyp     Cystocele     Diabetes mellitus (HCC)      stable    Disease of thyroid gland     hypothyroidism    Diverticulosis     Dizziness     at times    Dysfunctional uterine bleeding     last assessed - [id number]    Dysphagia     Fibromyalgia     Gastric ulcer     Gastroparesis     History of colonic polyps     last assessed - [id number]    History of gastroesophageal reflux (GERD)     Hypercholesterolemia     Hyperlipidemia     Hypertension     Hyponatremia     IBS (irritable bowel syndrome)     Pneumobilia 06/18/2022    Post laminectomy syndrome     S/P insertion of spinal cord stimulator 07/18/2018    s/p [employer] loop recorder 3/2/2024 03/02/2024    Seasonal allergies     Shortness of breath     exertional    Spinal stenosis     Status post lumbar spinal fusion 03/16/2018    Stroke (HCC)     pt states slight stroke March 2022     Present on Admission:   Degenerative lumbar spinal stenosis   Type 2 diabetes mellitus with other skin complications ([facility])   Chronic pain disorder   Hypertension   Hypothyroidism   Overactive bladder   Cerebrovascular accident (CVA), unspecified mechanism ([facility])   Chronic obstructive pulmonary disease, unspecified COPD type ([facility])   Acute on chronic diastolic congestive heart failure ([facility])   SADAF (acute kidney injury) ([facility])      Admitting Diagnosis: Chest pain [R07.9]  CHF exacerbation ([facility]) [I50.9]  SADAF (acute kidney injury) ([facility]) [N17.9]  Age/Sex: 79 y.o. female    Network Utilization Review Department  ATTENTION: Please call with any questions or concerns to [phone] and carefully listen to the prompts so that you are directed to the right person. All voicemails are confidential.   For Discharge needs, contact Care Management DC Support Team at [phone] opt. 2  Send all requests for admission clinical reviews, approved or denied determinations and any other requests to dedicated fax number below belonging to the campus where the patient is receiving treatment. List of dedicated fax numbers for the Facilities:  FACILITY NAME  UR FAX NUMBER   ADMISSION DENIALS (Administrative/Medical Necessity) [phone]   DISCHARGE SUPPORT TEAM (NETWORK) [phone]   PARENT CHILD HEALTH (Maternity/NICU/Pediatrics) [phone]   [facility] [phone]   [facility] [phone]   [facility] [phone]   [facility] [phone]   [facility] [phone]   [facility] [phone]   [facility] [phone]   [facility] [phone]   [facility] [phone]   [facility] [phone]   [facility] [phone]   [facility] [phone]

## 2024-10-14 NOTE — TELEPHONE ENCOUNTER
Patient seen in consultation at [facility] for b/l back pain. Found to be retaining large volume greater than 700 mL. Ahn catheter placed. She will need outpatietn void trial in approximately 1 week. She is s/p injection of Botox 200 international units by Dr. Ramirez on 10/02. She may require ahn catheter for extended period of time.

## 2024-10-14 NOTE — UTILIZATION REVIEW
NOTIFICATION OF INPATIENT ADMISSION   AUTHORIZATION REQUEST   SERVICING FACILITY:   [place] [address]  Tax ID: [id number]  NPI: [id number] ATTENDING PROVIDER:  Attending Name and NPI#: Paco Carmona Md [[id number]]  Address: [address] [place] [address]  Phone: [phone]     ADMISSION INFORMATION:  Place of Service: Inpatient [facility]  Place of Service Code: 21  Inpatient Admission Date/Time: 10/11/24  4:46 PM  Discharge Date/Time: No discharge date for patient encounter.  Admitting Diagnosis Code/Description:  Chest pain [R07.9]  CHF exacerbation (HCC) [I50.9]  SADAF (acute kidney injury) (HCC) [N17.9]     UTILIZATION REVIEW CONTACT:  Lorraine Carter Utilization   Network Utilization Review Department  Phone: [phone]  Fax [phone]  Email: [email or web address]  Contact for approvals/pending authorizations, clinical reviews, and discharge.     PHYSICIAN ADVISORY SERVICES:  Medical Necessity Denial & Hxdq-pd-Qlia Review  Phone: [phone]  Fax: [phone]  Email: [email or web address]     DISCHARGE SUPPORT TEAM:  For Patients Discharge Needs & Updates  Phone: [phone] opt. 2 Fax: [phone]  Email: [email or web address]

## 2024-10-14 NOTE — ASSESSMENT & PLAN NOTE
Cannot exclude possible cause of acute urinary tension in combination with Botox.  Continue with ciprofloxacin as prescribed.  Low concern for pyelonephritis given lack of fevers, leukocytosis, and negative CT imaging

## 2024-10-14 NOTE — PROGRESS NOTES
Cardiology Progress Note - Mattie Varela 79 y.o. female MRN: [id number]    Unit/Bed#: E5 -01 Encounter: [id number]      Assessment & Plan:    Acute on chronic diastolic congestive heart failure (HCC)  -TTE 5/23/2024 showed EF 70%, grade 1 DD, mild dynamic LVOT obstruction with a peak gradient of 25 mmHg with Valsalva, mild TR  - BNP 55 on admission  - Chest x-ray on admission showed no acute cardiopulmonary disease  - CT chest abdomen pelvis 10/11/2024 showed no evidence of consolidation or pleural effusion  - Discharge weight 5/30/2024 166 pounds, office weight 7/24/2024 176 pounds, weight on admission was 191 pounds  - Outpatient diuretic Rx furosemide 20 mg p.o. daily-> changed to torsemide 30 mg daily on 9/26/2024 due to weight gain  - Current diuretic Rx furosemide 60 mg IV twice daily    Precordial chest pain  - Patient midsternal chest discomfort radiating to her right side prior to admission  - No acute ischemic changes on ECG  - Troponins negative x 2 on admission  - No evidence of ACS at this time    Coronary artery disease  - C 11/14/2023 showed diffuse calcified CAD with no focal lesions, OM1 70% with normal IFR, no interventions performed  - Continue with aspirin 81 mg daily    Hypertension  - Outpatient Rx Toprol-XL 50 mg twice daily    Hyperlipidemia  - Continue with atorvastatin 40 mg daily    Cerebrovascular accident (CVA), unspecified mechanism (HCC)  -CTA November 2023 showed moderate stenosis of the distal right MCA M1, focal moderate stenosis involving the inferior basilar artery with a diffuse tubular dilation at the right ICA origin  - Continue with aspirin 81 mg daily and atorvastatin 40 mg daily  - Underwent [employer] loop recorder implantation on 3/2/2024  - No recorded events as of last interrogation on 9/17/2024    Degenerative lumbar spinal stenosis    Type 2 diabetes mellitus with other skin complications (HCC)    Chronic pain disorder    Chronic obstructive pulmonary disease,  unspecified COPD type (HCC)    SADAF (acute kidney injury) ([facility])    Acute cystitis    Hypothyroidism    Overactive bladder     Summary:  -Patient diuresed about 2.5 L yesterday, volume status continues to improve on exam  -BUN/creatinine also continuing to improve   -Continue with furosemide 60 mg IV twice daily for diuresis  - Likely transition back to oral diuretics in 24 to 48 hours, plan to change to torsemide 20 mg p.o. twice daily  - Check daily standing weights  - Monitor creatinine and electrolytes closely       Subjective:   No significant events overnight.  Continues to have severe back pain this morning which limits her mobility.  Reports worsening of her pain with deep inspiration.  Denies chest pain, abdominal pain, nausea, vomiting, fever, chills, headache, dizziness or palpitations.    Objective:     Vitals: Blood pressure 96/56, pulse 90, temperature 98.7 °F (37.1 °C), temperature source Oral, resp. rate 16, height 5' (1.524 m), weight 87.6 kg (193 lb 2 oz), SpO2 91%, not currently breastfeeding., Body mass index is 37.72 kg/m².,   Orthostatic Blood Pressures      Flowsheet Row Most Recent Value   Blood Pressure 96/56 filed at 10/14/2024 0729   Patient Position - Orthostatic VS Lying filed at 10/14/2024 0729              Intake/Output Summary (Last 24 hours) at 10/14/2024 1120  Last data filed at 10/14/2024 0634  Gross per 24 hour   Intake --   Output 2280 ml   Net -2280 ml           Physical Exam:    GEN: Mattiesa CARLEY Varela appears well, alert and oriented x 3, pleasant and cooperative   HEENT: Mucous membranes moist, no scleral icterus, no conjunctival pallor  NECK: Trace JVD  HEART: Regular rate and rhythm, normal S1 and S2, 2/6 systolic murmur  LUNGS: clear to auscultation bilaterally; no wheezes, rales, or rhonchi   ABDOMEN: normal bowel sounds, soft, no tenderness, no distention  EXTREMITIES: peripheral pulses normal; trace bilateral lower extremity edema  NEURO: no focal findings   SKIN: No  lesions or rashes on exposed skin        Current Facility-Administered Medications:     acetaminophen (TYLENOL) tablet 975 mg, 975 mg, Oral, Q8H [facility], Paco Carmona MD    albuterol (PROVENTIL HFA,VENTOLIN HFA) inhaler 2 puff, 2 puff, Inhalation, Q6H PRN, Alexys Angel DO    aluminum-magnesium hydroxide-simethicone (MAALOX) oral suspension 30 mL, 30 mL, Oral, Q6H PRN, Alexys Angel DO    aspirin chewable tablet 81 mg, 81 mg, Oral, Daily, Alexys Angel DO, 81 mg at 10/14/24 0837    atorvastatin (LIPITOR) tablet 40 mg, 40 mg, Oral, Daily, Alexys Angel DO, 40 mg at 10/14/24 0837    baclofen tablet 10 mg, 10 mg, Oral, BID PRN, Alexys Angel DO, 10 mg at 10/13/24 0803    ciprofloxacin (CIPRO) tablet 500 mg, 500 mg, Oral, Q24H, Alexys Angel DO, 500 mg at 10/13/24 1201    ferrous sulfate tablet 325 mg, 325 mg, Oral, Daily With Dinner, Alexys Angel DO, 325 mg at 10/13/24 1657    furosemide (LASIX) injection 60 mg, 60 mg, Intravenous, BID (diuretic), Natan Guzman MD, 60 mg at 10/14/24 0846    heparin (porcine) subcutaneous injection 5,000 Units, 5,000 Units, Subcutaneous, Q8H ARY, Alexys Angel DO, 5,000 Units at 10/14/24 0501    insulin lispro (HumALOG/ADMELOG) 100 units/mL subcutaneous injection 1-5 Units, 1-5 Units, Subcutaneous, 4x Daily (AC & HS), 1 Units at 10/14/24 0835 **AND** Fingerstick Glucose (POCT), , , 4x Daily AC and at bedtime, Alexys Angel DO    latanoprost (XALATAN) 0.005 % ophthalmic solution 1 drop, 1 drop, Both Eyes, HS, Alexys Angel DO, 1 drop at 10/13/24 2105    levothyroxine tablet 37.5 mcg, 37.5 mcg, Oral, Early Morning, Alexys Angel DO, 37.5 mcg at 10/14/24 0501    lidocaine (LIDODERM) 5 % patch 1 patch, 1 patch, Topical, Daily, Susannah Mcclain PA-C, 1 patch at 10/14/24 0837    magnesium Oxide (MAG-OX) tablet 400 mg, 400 mg, Oral, BID, Alexys Angel DO, 400 mg at 10/14/24 0837    meclizine (ANTIVERT) tablet 25 mg, 25 mg, Oral,  Daily PRN, Alexys Angel DO    melatonin tablet 6 mg, 6 mg, Oral, HS, Royer Carballo DO    metoprolol succinate (TOPROL-XL) 24 hr tablet 50 mg, 50 mg, Oral, Q12H ARY, Alexys Angel DO, 50 mg at 10/14/24 0837    [Held by provider] Milnacipran HCl TABS 100 mg, 1 tablet, Oral, Daily, Royer Carballo DO    morphine injection 2 mg, 2 mg, Intravenous, Q4H PRN, Susannah Mcclain PA-C, 2 mg at 10/14/24 0117    morphine injection 2 mg, 2 mg, Intravenous, Q4H PRN, Paco Carmona MD    ondansetron (ZOFRAN) injection 4 mg, 4 mg, Intravenous, Q6H PRN, Alexys Angel DO    oxyCODONE (ROXICODONE) immediate release tablet 10 mg, 10 mg, Oral, TID PRN, Susannah Mcclain PA-C, 10 mg at 10/14/24 0846    pantoprazole (PROTONIX) EC tablet 40 mg, 40 mg, Oral, Daily Before Breakfast, Alexys Angel DO, 40 mg at 10/14/24 0501    polyethylene glycol (MIRALAX) packet 17 g, 17 g, Oral, Daily, Alexys Angel DO, 17 g at 10/14/24 0837    senna-docusate sodium (SENOKOT S) 8.6-50 mg per tablet 1 tablet, 1 tablet, Oral, BID, Susannah Mcclain PA-C, 1 tablet at 10/14/24 0837    zolpidem (AMBIEN) tablet 5 mg, 5 mg, Oral, HS PRN, Susannah Mcclain PA-C, 5 mg at 10/13/24 2100    Labs & Results:    Lab Results   Component Value Date    CKTOTAL 50 05/26/2024    TROPONINI 0.04 07/16/2020    TROPONINI 0.06 (H) 07/15/2020       Lab Results   Component Value Date    GLUCOSE 193 (H) 06/04/2018    CALCIUM 9.7 10/14/2024     04/30/2015    K 4.1 10/14/2024    CO2 34 (H) 10/14/2024    CL 92 (L) 10/14/2024    BUN 25 10/14/2024    CREATININE 1.33 (H) 10/14/2024       Lab Results   Component Value Date    WBC 8.28 10/13/2024    HGB 11.9 10/13/2024    HCT 36.9 10/13/2024    MCV 91 10/13/2024     10/13/2024           Lab Results   Component Value Date    CHOL 165 04/30/2015    CHOL 208 03/20/2014     Lab Results   Component Value Date    HDL 42 (L) 11/26/2023    HDL 50 11/11/2023     Lab Results   Component Value Date    LDLCALC 42  11/26/2023    LDLCALC 62 11/11/2023     Lab Results   Component Value Date    TRIG 179 (H) 11/26/2023    TRIG 84 11/11/2023       Lab Results   Component Value Date    ALT 7 10/11/2024    AST 17 10/11/2024    ALKPHOS 123 (H) 10/11/2024         EKG personally reviewed by )Natan Guzman MD. No acute changes

## 2024-10-14 NOTE — ASSESSMENT & PLAN NOTE
Patient complaining of severe worsening bilateral lower back pain. CT imaging in the ER was negative for any acute findings. Of note, bladder was distended on CT. Per ER documentation patient had documented PVR of 68 mL, but 1 hour later was straight catheterized for 500 mL.   Patient reports she is incontinent at baseline and has only been voiding small amounts. Nursing has been using a Purewick to collect urine. Patient reports she last voided about 15 minutes prior to my exam.   Post-void residual measured at 713 mL.       Plan:  I suspect patients back pain is likely due to large volume urinary retention following recent cystoscopy injection of botox on 10/02/2024.  Nursing instructed to place ahn catheter and maintain upon discharge. Our office will coordinate void trial. There is a good chance patient may be catheter dependent for undetermined amount of time until the effects of botox wears off.  Cannot entirely exclude the possibility of acute cystitis to account for her retention as well.  Patient should hopefully have immediate relief of her bilateral back pain following bladder decompression with Ahn catheter.  Should also see continued improvement in her renal function as well.  Continue ciprofloxacin as prescribed.  Low concern for pyelonephritis given lack of fevers, negative leukocytosis, and negative CT imaging.

## 2024-10-14 NOTE — ASSESSMENT & PLAN NOTE
Status post spinal stimulator, although daughter stated that this is no longer working.  Utilizes baclofen as needed and Percocet 10 mg 3 times daily as needed

## 2024-10-14 NOTE — ASSESSMENT & PLAN NOTE
Results from last 7 days   Lab Units 10/14/24  0457 10/13/24  0554 10/12/24  0535 10/11/24  1228   CREATININE mg/dL 1.33* 1.43* 1.50* 1.65*   Baseline 1.1-1.3.  Etiology suspected to be due to cardiorenal.  Improving.

## 2024-10-14 NOTE — ASSESSMENT & PLAN NOTE
Lab Results   Component Value Date    HGBA1C 7.4 (H) 10/12/2024     Recent Labs     10/13/24  1053 10/13/24  1621 10/13/24  2057 10/14/24  0732   POCGLU 198* 148* 171* 164*   Continue sliding scale

## 2024-10-14 NOTE — PLAN OF CARE
Problem: Potential for Falls  Goal: Patient will remain free of falls  Description: INTERVENTIONS:  - Educate patient/family on patient safety including physical limitations  - Instruct patient to call for assistance with activity   - Consult OT/PT to assist with strengthening/mobility   - Keep Call bell within reach  - Keep bed low and locked with side rails adjusted as appropriate  - Keep care items and personal belongings within reach  - Initiate and maintain comfort rounds  - Make Fall Risk Sign visible to staff  - Offer Toileting every 2 Hours, in advance of need  - Initiate/Maintain  bed alarm  - Obtain necessary fall risk management equipment:   - Apply yellow socks and bracelet for high fall risk patients  - Consider moving patient to room near nurses station  Outcome: Progressing     Problem: PAIN - ADULT  Goal: Verbalizes/displays adequate comfort level or baseline comfort level  Description: Interventions:  - Encourage patient to monitor pain and request assistance  - Assess pain using appropriate pain scale  - Administer analgesics based on type and severity of pain and evaluate response  - Implement non-pharmacological measures as appropriate and evaluate response  - Consider cultural and social influences on pain and pain management  - Notify physician/advanced practitioner if interventions unsuccessful or patient reports new pain  Outcome: Progressing     Problem: INFECTION - ADULT  Goal: Absence or prevention of progression during hospitalization  Description: INTERVENTIONS:  - Assess and monitor for signs and symptoms of infection  - Monitor lab/diagnostic results  - Monitor all insertion sites, i.e. indwelling lines, tubes, and drains  - Monitor endotracheal if appropriate and nasal secretions for changes in amount and color  - [place] appropriate cooling/warming therapies per order  - Administer medications as ordered  - Instruct and encourage patient and family to use good hand hygiene  technique  - Identify and instruct in appropriate isolation precautions for identified infection/condition  Outcome: Progressing  Goal: Absence of fever/infection during neutropenic period  Description: INTERVENTIONS:  - Monitor WBC    Outcome: Progressing     Problem: SAFETY ADULT  Goal: Patient will remain free of falls  Description: INTERVENTIONS:  - Educate patient/family on patient safety including physical limitations  - Instruct patient to call for assistance with activity   - Consult OT/PT to assist with strengthening/mobility   - Keep Call bell within reach  - Keep bed low and locked with side rails adjusted as appropriate  - Keep care items and personal belongings within reach  - Initiate and maintain comfort rounds  - Make Fall Risk Sign visible to staff  - Offer Toileting every 2 Hours, in advance of need  - Initiate/Maintain bed alarm  - Obtain necessary fall risk management equipment:   - Apply yellow socks and bracelet for high fall risk patients  - Consider moving patient to room near nurses station  Outcome: Progressing  Goal: Maintain or return to baseline ADL function  Description: INTERVENTIONS:  -  Assess patient's ability to carry out ADLs; assess patient's baseline for ADL function and identify physical deficits which impact ability to perform ADLs (bathing, care of mouth/teeth, toileting, grooming, dressing, etc.)  - Assess/evaluate cause of self-care deficits   - Assess range of motion  - Assess patient's mobility; develop plan if impaired  - Assess patient's need for assistive devices and provide as appropriate  - Encourage maximum independence but intervene and supervise when necessary  - Involve family in performance of ADLs  - Assess for home care needs following discharge   - Consider OT consult to assist with ADL evaluation and planning for discharge  - Provide patient education as appropriate  Outcome: Progressing  Goal: Maintains/Returns to pre admission functional  level  Description: INTERVENTIONS:  - Perform AM-PAC 6 Click Basic Mobility/ Daily Activity assessment daily.  - Set and communicate daily mobility goal to care team and patient/family/caregiver.   - Collaborate with rehabilitation services on mobility goals if consulted  - Perform Range of Motion 3 times a day.  - Reposition patient every 2 hours.  - Dangle patient 3 times a day  - Stand patient 3 times a day  - Ambulate patient 3 times a day  - Out of bed to chair 3 times a day   - Out of bed for meals 3 times a day  - Out of bed for toileting  - Record patient progress and toleration of activity level   Outcome: Progressing     Problem: DISCHARGE PLANNING  Goal: Discharge to home or other facility with appropriate resources  Description: INTERVENTIONS:  - Identify barriers to discharge w/patient and caregiver  - Arrange for needed discharge resources and transportation as appropriate  - Identify discharge learning needs (meds, wound care, etc.)  - Arrange for interpretive services to assist at discharge as needed  - Refer to Case Management Department for coordinating discharge planning if the patient needs post-hospital services based on physician/advanced practitioner order or complex needs related to functional status, cognitive ability, or social support system  Outcome: Progressing     Problem: Knowledge Deficit  Goal: Patient/family/caregiver demonstrates understanding of disease process, treatment plan, medications, and discharge instructions  Description: Complete learning assessment and assess knowledge base.  Interventions:  - Provide teaching at level of understanding  - Provide teaching via preferred learning methods  Outcome: Progressing     Problem: Nutrition/Hydration-ADULT  Goal: Nutrient/Hydration intake appropriate for improving, restoring or maintaining nutritional needs  Description: Monitor and assess patient's nutrition/hydration status for malnutrition. Collaborate with interdisciplinary  team and initiate plan and interventions as ordered.  Monitor patient's weight and dietary intake as ordered or per policy. Utilize nutrition screening tool and intervene as necessary. Determine patient's food preferences and provide high-protein, high-caloric foods as appropriate.     INTERVENTIONS:  - Monitor oral intake, urinary output, labs, and treatment plans  - Assess nutrition and hydration status and recommend course of action  - Evaluate amount of meals eaten  - Assist patient with eating if necessary   - Allow adequate time for meals  - Recommend/ encourage appropriate diets, oral nutritional supplements, and vitamin/mineral supplements  - Order, calculate, and assess calorie counts as needed  - Recommend, monitor, and adjust tube feedings and TPN/PPN based on assessed needs  - Assess need for intravenous fluids  - Provide specific nutrition/hydration education as appropriate  - Include patient/family/caregiver in decisions related to nutrition  Outcome: Progressing

## 2024-10-15 LAB
ANION GAP SERPL CALCULATED.3IONS-SCNC: 8 MMOL/L (ref 4–13)
BUN SERPL-MCNC: 30 MG/DL (ref 5–25)
CALCIUM SERPL-MCNC: 9.6 MG/DL (ref 8.4–10.2)
CHLORIDE SERPL-SCNC: 91 MMOL/L (ref 96–108)
CO2 SERPL-SCNC: 36 MMOL/L (ref 21–32)
CREAT SERPL-MCNC: 1.5 MG/DL (ref 0.6–1.3)
ERYTHROCYTE [DISTWIDTH] IN BLOOD BY AUTOMATED COUNT: 13.4 % (ref 11.6–15.1)
GFR SERPL CREATININE-BSD FRML MDRD: 32 ML/MIN/1.73SQ M
GLUCOSE SERPL-MCNC: 178 MG/DL (ref 65–140)
GLUCOSE SERPL-MCNC: 196 MG/DL (ref 65–140)
GLUCOSE SERPL-MCNC: 221 MG/DL (ref 65–140)
GLUCOSE SERPL-MCNC: 224 MG/DL (ref 65–140)
GLUCOSE SERPL-MCNC: 242 MG/DL (ref 65–140)
HCT VFR BLD AUTO: 35.7 % (ref 34.8–46.1)
HGB BLD-MCNC: 11.3 G/DL (ref 11.5–15.4)
MAGNESIUM SERPL-MCNC: 2 MG/DL (ref 1.9–2.7)
MCH RBC QN AUTO: 28.2 PG (ref 26.8–34.3)
MCHC RBC AUTO-ENTMCNC: 31.7 G/DL (ref 31.4–37.4)
MCV RBC AUTO: 89 FL (ref 82–98)
PHOSPHATE SERPL-MCNC: 4.9 MG/DL (ref 2.3–4.1)
PLATELET # BLD AUTO: 268 THOUSANDS/UL (ref 149–390)
PMV BLD AUTO: 10.3 FL (ref 8.9–12.7)
POTASSIUM SERPL-SCNC: 3.8 MMOL/L (ref 3.5–5.3)
RBC # BLD AUTO: 4.01 MILLION/UL (ref 3.81–5.12)
SODIUM SERPL-SCNC: 135 MMOL/L (ref 135–147)
WBC # BLD AUTO: 8.42 THOUSAND/UL (ref 4.31–10.16)

## 2024-10-15 PROCEDURE — 80048 BASIC METABOLIC PNL TOTAL CA: CPT | Performed by: STUDENT IN AN ORGANIZED HEALTH CARE EDUCATION/TRAINING PROGRAM

## 2024-10-15 PROCEDURE — 99232 SBSQ HOSP IP/OBS MODERATE 35: CPT | Performed by: STUDENT IN AN ORGANIZED HEALTH CARE EDUCATION/TRAINING PROGRAM

## 2024-10-15 PROCEDURE — 97110 THERAPEUTIC EXERCISES: CPT

## 2024-10-15 PROCEDURE — 83735 ASSAY OF MAGNESIUM: CPT | Performed by: STUDENT IN AN ORGANIZED HEALTH CARE EDUCATION/TRAINING PROGRAM

## 2024-10-15 PROCEDURE — 97530 THERAPEUTIC ACTIVITIES: CPT

## 2024-10-15 PROCEDURE — 84100 ASSAY OF PHOSPHORUS: CPT | Performed by: STUDENT IN AN ORGANIZED HEALTH CARE EDUCATION/TRAINING PROGRAM

## 2024-10-15 PROCEDURE — 85027 COMPLETE CBC AUTOMATED: CPT | Performed by: STUDENT IN AN ORGANIZED HEALTH CARE EDUCATION/TRAINING PROGRAM

## 2024-10-15 PROCEDURE — 82948 REAGENT STRIP/BLOOD GLUCOSE: CPT

## 2024-10-15 PROCEDURE — 97116 GAIT TRAINING THERAPY: CPT

## 2024-10-15 PROCEDURE — 97535 SELF CARE MNGMENT TRAINING: CPT

## 2024-10-15 RX ORDER — MUSCLE RUB CREAM 100; 150 MG/G; MG/G
CREAM TOPICAL 4 TIMES DAILY PRN
Status: DISCONTINUED | OUTPATIENT
Start: 2024-10-15 | End: 2024-10-19 | Stop reason: HOSPADM

## 2024-10-15 RX ORDER — BISACODYL 5 MG/1
10 TABLET, DELAYED RELEASE ORAL ONCE
Status: COMPLETED | OUTPATIENT
Start: 2024-10-15 | End: 2024-10-15

## 2024-10-15 RX ADMIN — INSULIN LISPRO 2 UNITS: 100 INJECTION, SOLUTION INTRAVENOUS; SUBCUTANEOUS at 21:31

## 2024-10-15 RX ADMIN — INSULIN LISPRO 2 UNITS: 100 INJECTION, SOLUTION INTRAVENOUS; SUBCUTANEOUS at 12:05

## 2024-10-15 RX ADMIN — LIDOCAINE 1 PATCH: 700 PATCH TOPICAL at 07:51

## 2024-10-15 RX ADMIN — INSULIN LISPRO 1 UNITS: 100 INJECTION, SOLUTION INTRAVENOUS; SUBCUTANEOUS at 07:46

## 2024-10-15 RX ADMIN — PANTOPRAZOLE SODIUM 40 MG: 40 TABLET, DELAYED RELEASE ORAL at 05:13

## 2024-10-15 RX ADMIN — MELATONIN 6 MG: 3 TAB ORAL at 21:28

## 2024-10-15 RX ADMIN — ATORVASTATIN CALCIUM 40 MG: 40 TABLET, FILM COATED ORAL at 07:51

## 2024-10-15 RX ADMIN — POLYETHYLENE GLYCOL 3350 17 G: 17 POWDER, FOR SOLUTION ORAL at 07:51

## 2024-10-15 RX ADMIN — LATANOPROST 1 DROP: 50 SOLUTION OPHTHALMIC at 21:34

## 2024-10-15 RX ADMIN — ONDANSETRON 4 MG: 2 INJECTION INTRAMUSCULAR; INTRAVENOUS at 05:06

## 2024-10-15 RX ADMIN — FERROUS SULFATE TAB 325 MG (65 MG ELEMENTAL FE) 325 MG: 325 (65 FE) TAB at 15:50

## 2024-10-15 RX ADMIN — CIPROFLOXACIN 500 MG: 500 TABLET ORAL at 12:05

## 2024-10-15 RX ADMIN — BISACODYL 10 MG: 5 TABLET, COATED ORAL at 16:24

## 2024-10-15 RX ADMIN — Medication 400 MG: at 21:28

## 2024-10-15 RX ADMIN — LEVOTHYROXINE SODIUM 37.5 MCG: 75 TABLET ORAL at 05:13

## 2024-10-15 RX ADMIN — HEPARIN SODIUM 5000 UNITS: 5000 INJECTION INTRAVENOUS; SUBCUTANEOUS at 15:50

## 2024-10-15 RX ADMIN — HEPARIN SODIUM 5000 UNITS: 5000 INJECTION INTRAVENOUS; SUBCUTANEOUS at 05:17

## 2024-10-15 RX ADMIN — METOPROLOL SUCCINATE 50 MG: 50 TABLET, EXTENDED RELEASE ORAL at 07:51

## 2024-10-15 RX ADMIN — FUROSEMIDE 60 MG: 10 INJECTION, SOLUTION INTRAVENOUS at 07:48

## 2024-10-15 RX ADMIN — HYDROMORPHONE HYDROCHLORIDE 0.2 MG: 0.2 INJECTION, SOLUTION INTRAMUSCULAR; INTRAVENOUS; SUBCUTANEOUS at 21:30

## 2024-10-15 RX ADMIN — METOPROLOL SUCCINATE 50 MG: 50 TABLET, EXTENDED RELEASE ORAL at 21:28

## 2024-10-15 RX ADMIN — ACETAMINOPHEN 975 MG: 325 TABLET, FILM COATED ORAL at 15:50

## 2024-10-15 RX ADMIN — ASPIRIN 81 MG CHEWABLE TABLET 81 MG: 81 TABLET CHEWABLE at 07:52

## 2024-10-15 RX ADMIN — MORPHINE SULFATE 2 MG: 2 INJECTION, SOLUTION INTRAMUSCULAR; INTRAVENOUS at 05:13

## 2024-10-15 RX ADMIN — INSULIN LISPRO 2 UNITS: 100 INJECTION, SOLUTION INTRAVENOUS; SUBCUTANEOUS at 16:24

## 2024-10-15 RX ADMIN — ACETAMINOPHEN 975 MG: 325 TABLET, FILM COATED ORAL at 05:13

## 2024-10-15 RX ADMIN — SENNOSIDES AND DOCUSATE SODIUM 1 TABLET: 8.6; 5 TABLET ORAL at 07:52

## 2024-10-15 RX ADMIN — MORPHINE SULFATE 2 MG: 2 INJECTION, SOLUTION INTRAMUSCULAR; INTRAVENOUS at 10:06

## 2024-10-15 RX ADMIN — Medication 400 MG: at 07:51

## 2024-10-15 RX ADMIN — HEPARIN SODIUM 5000 UNITS: 5000 INJECTION INTRAVENOUS; SUBCUTANEOUS at 21:34

## 2024-10-15 RX ADMIN — ZOLPIDEM TARTRATE 5 MG: 5 TABLET, COATED ORAL at 21:28

## 2024-10-15 RX ADMIN — HYDROMORPHONE HYDROCHLORIDE 0.2 MG: 0.2 INJECTION, SOLUTION INTRAMUSCULAR; INTRAVENOUS; SUBCUTANEOUS at 12:08

## 2024-10-15 RX ADMIN — BACLOFEN 10 MG: 10 TABLET ORAL at 12:08

## 2024-10-15 RX ADMIN — ACETAMINOPHEN 975 MG: 325 TABLET, FILM COATED ORAL at 21:28

## 2024-10-15 NOTE — PLAN OF CARE
Problem: PHYSICAL THERAPY ADULT  Goal: Performs mobility at highest level of function for planned discharge setting.  See evaluation for individualized goals.  Description: Treatment/Interventions: Functional transfer training, LE strengthening/ROM, Therapeutic exercise, Endurance training, Patient/family training, Bed mobility, Spoke to nursing, Gait training, OT          See flowsheet documentation for full assessment, interventions and recommendations.  Outcome: Progressing  Note: Prognosis: Fair  Problem List: Decreased strength, Decreased endurance, Impaired balance, Decreased mobility, Obesity, Orthopedic restrictions, Pain  Assessment: Pt seen for PT treatment session this date with interventions consisting of bed mobility, transfer training, gait training, and HEP, and education provided as needed for safety and direction to improve functional mobility, safety awareness, and activity tolerance. Pt agreeable to PT treatment session upon arrival, pt found supine in bed . At end of session, pt left  seated out of bed in recliner with all needs in reach. In comparison to previous session, pt with improvement in activity tolerance, endurance, standing balance, ambulation distances, ambulatory balance, AM- pac score, and functional mobility. Pt  is showing improved tolerance to activity and endurance with progress this session.  Pt is currently functioning at  mod assist x1 for supine to sit with head of bed elevated and use of bed rail.  Sit to stand transfers with min assist x2 with verbal cues for hand placement. Pt  progressed with ambulation.  Pt  ambulates 15' with min assist x2 with close chair follow due to low back pain and fatigue. Verbal cues for improved gait quality, pt  initially taking short strides with decreased foot  clearance and step to gait  progressing to longer strides with step through gait. Pt  performs supine b/l le arom exercises x 10- 20 reps to tolerance. Increased time to perform  and complete all mobility required due to low back pain.  Pt  is functioning well below baseline as far as ambulation distances and assistance required for all functional mobility including bed mobility,  transfers and ambulation with use of rollator walker.    Please refer to endurance deficit section of flowsheet for vitals. Continue to recommend  level II moderate rehab resource intensity (unless EDWIN is able to care for pt at current level of mobilty then home with HHPT)  at time of d/c in order to maximize pt's functional independence and safety w/ mobility. Pt continues to be functioning below baseline level. PT will continue to see pt while here in order to address the deficits listed above and provide interventions consistent w/ POC in effort to achieve STGs.    The patient's AM-PAC Basic Mobility Inpatient Short Form Raw Score is 14. A raw score less than 16 suggests the patient may benefit from discharge to post-acute rehabilitation services. Please also refer to the recommendation of the Physical Therapist for safe discharge planning.        Rehab Resource Intensity Level, PT: II (Moderate Resource Intensity)    See flowsheet documentation for full assessment.

## 2024-10-15 NOTE — PLAN OF CARE
Problem: Potential for Falls  Goal: Patient will remain free of falls  Description: INTERVENTIONS:  - Educate patient/family on patient safety including physical limitations  - Instruct patient to call for assistance with activity   - Consult OT/PT to assist with strengthening/mobility   - Keep Call bell within reach  - Keep bed low and locked with side rails adjusted as appropriate  - Keep care items and personal belongings within reach  - Initiate and maintain comfort rounds  - Make Fall Risk Sign visible to staff  - Offer Toileting every 2 Hours, in advance of need  - Initiate/Maintain bed alarm  - Apply yellow socks and bracelet for high fall risk patients  - Consider moving patient to room near nurses station  Outcome: Progressing     Problem: PAIN - ADULT  Goal: Verbalizes/displays adequate comfort level or baseline comfort level  Description: Interventions:  - Encourage patient to monitor pain and request assistance  - Assess pain using appropriate pain scale  - Administer analgesics based on type and severity of pain and evaluate response  - Implement non-pharmacological measures as appropriate and evaluate response  - Consider cultural and social influences on pain and pain management  - Notify physician/advanced practitioner if interventions unsuccessful or patient reports new pain  Outcome: Progressing     Problem: INFECTION - ADULT  Goal: Absence or prevention of progression during hospitalization  Description: INTERVENTIONS:  - Assess and monitor for signs and symptoms of infection  - Monitor lab/diagnostic results  - Monitor all insertion sites, i.e. indwelling lines, tubes, and drains  - Monitor endotracheal if appropriate and nasal secretions for changes in amount and color  - Adams appropriate cooling/warming therapies per order  - Administer medications as ordered  - Instruct and encourage patient and family to use good hand hygiene technique  - Identify and instruct in appropriate isolation  precautions for identified infection/condition  Outcome: Progressing  Goal: Absence of fever/infection during neutropenic period  Description: INTERVENTIONS:  - Monitor WBC    Outcome: Progressing     Problem: SAFETY ADULT  Goal: Patient will remain free of falls  Description: INTERVENTIONS:  - Educate patient/family on patient safety including physical limitations  - Instruct patient to call for assistance with activity   - Consult OT/PT to assist with strengthening/mobility   - Keep Call bell within reach  - Keep bed low and locked with side rails adjusted as appropriate  - Keep care items and personal belongings within reach  - Initiate and maintain comfort rounds  - Make Fall Risk Sign visible to staff  - Offer Toileting every 2 Hours, in advance of need  - Initiate/Maintain bed alarm  - Apply yellow socks and bracelet for high fall risk patients  - Consider moving patient to room near nurses station  Outcome: Progressing  Goal: Maintain or return to baseline ADL function  Description: INTERVENTIONS:  - Educate patient/family on patient safety including physical limitations  - Instruct patient to call for assistance with activity   - Consult OT/PT to assist with strengthening/mobility   - Keep Call bell within reach  - Keep bed low and locked with side rails adjusted as appropriate  - Keep care items and personal belongings within reach  - Initiate and maintain comfort rounds  - Make Fall Risk Sign visible to staff  - Offer Toileting every 2 Hours, in advance of need  - Initiate/Maintain bed alarm  - Apply yellow socks and bracelet for high fall risk patients  - Consider moving patient to room near nurses station  Outcome: Progressing  Goal: Maintains/Returns to pre admission functional level  Description: INTERVENTIONS:  - Perform AM-PAC 6 Click Basic Mobility/ Daily Activity assessment daily.  - Set and communicate daily mobility goal to care team and patient/family/caregiver.   - Collaborate with  rehabilitation services on mobility goals if consulted  - Perform Range of Motion  times a day.  - Reposition patient every  hours.  - Dangle patient  times a day  - Stand patient  times a day  - Ambulate patient  times a day  - Out of bed to chair  times a day   - Out of bed for meals times a day  - Out of bed for toileting  - Record patient progress and toleration of activity level   Outcome: Progressing     Problem: DISCHARGE PLANNING  Goal: Discharge to home or other facility with appropriate resources  Description: INTERVENTIONS:  - Identify barriers to discharge w/patient and caregiver  - Arrange for needed discharge resources and transportation as appropriate  - Identify discharge learning needs (meds, wound care, etc.)  - Arrange for interpretive services to assist at discharge as needed  - Refer to Case Management Department for coordinating discharge planning if the patient needs post-hospital services based on physician/advanced practitioner order or complex needs related to functional status, cognitive ability, or social support system  Outcome: Progressing     Problem: Knowledge Deficit  Goal: Patient/family/caregiver demonstrates understanding of disease process, treatment plan, medications, and discharge instructions  Description: Complete learning assessment and assess knowledge base.  Interventions:  - Provide teaching at level of understanding  - Provide teaching via preferred learning methods  Outcome: Progressing     Problem: Prexisting or High Potential for Compromised Skin Integrity  Goal: Skin integrity is maintained or improved  Description: INTERVENTIONS:  - Identify patients at risk for skin breakdown  - Assess and monitor skin integrity  - Assess and monitor nutrition and hydration status  - Monitor labs   - Assess for incontinence   - Turn and reposition patient  - Assist with mobility/ambulation  - Relieve pressure over bony prominences  - Avoid friction and shearing  - Provide  appropriate hygiene as needed including keeping skin clean and dry  - Evaluate need for skin moisturizer/barrier cream  - Collaborate with interdisciplinary team   - Patient/family teaching  - Consider wound care consult   Outcome: Progressing     Problem: Nutrition/Hydration-ADULT  Goal: Nutrient/Hydration intake appropriate for improving, restoring or maintaining nutritional needs  Description: Monitor and assess patient's nutrition/hydration status for malnutrition. Collaborate with interdisciplinary team and initiate plan and interventions as ordered.  Monitor patient's weight and dietary intake as ordered or per policy. Utilize nutrition screening tool and intervene as necessary. Determine patient's food preferences and provide high-protein, high-caloric foods as appropriate.     INTERVENTIONS:  - Monitor oral intake, urinary output, labs, and treatment plans  - Assess nutrition and hydration status and recommend course of action  - Evaluate amount of meals eaten  - Assist patient with eating if necessary   - Allow adequate time for meals  - Recommend/ encourage appropriate diets, oral nutritional supplements, and vitamin/mineral supplements  - Order, calculate, and assess calorie counts as needed  - Recommend, monitor, and adjust tube feedings and TPN/PPN based on assessed needs  - Assess need for intravenous fluids  - Provide specific nutrition/hydration education as appropriate  - Include patient/family/caregiver in decisions related to nutrition  Outcome: Progressing

## 2024-10-15 NOTE — CASE MANAGEMENT
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     Generalized edema 2024     Constipation 2024     Leg pain, right 2024     Acute on chronic diastolic congestive heart failure (HCC) 2024     s/p [employer] loop recorder 3/2/2024 2024     Moderate protein-calorie malnutrition (HCC) 2024     Hypertensive urgency 2024     AMS (altered mental status) 2024     Encounter for examination for admission to nursing home 2024     Stage 3a chronic kidney disease ([facility]) 01/10/2024     Left ventricular outflow obstruction 2024     Obesity, Class I, BMI 30-34.9 2024     Urinary retention 2023     SADAF (acute kidney injury) ([facility]) 2023     Exertional shortness of breath 2023     Non obstructive CAD 11/15/2023     History of lumbar surgery 11/10/2023     Abnormal urinalysis 2023     Chronic obstructive pulmonary disease, unspecified COPD type ([facility]) 2023     Confusion with body shakes and blank stare 2023     Anemia in stage 3a chronic kidney disease  (HCC) 2023     Lower extremity edema 2023     Cerebrovascular accident (CVA), unspecified mechanism ([facility]) 2022     Stroke-like symptoms 2022     Prepyloric ulcer 2021     Diarrhea 2021     Mild intermittent asthma without complication 2020     S/P insertion of spinal cord stimulator 2018     Iron deficiency anemia secondary to inadequate dietary iron intake 2018     Type 2 diabetes mellitus with other  skin complications (HCC)      Failed back surgical syndrome 03/16/2018     Hypothyroidism 11/20/2017     Degenerative lumbar spinal stenosis 01/25/2017     Glaucoma 01/06/2017     Overactive bladder 08/04/2016     Dyspepsia 02/09/2016     Hypercholesterolemia 02/09/2016     Anxiety 02/09/2015     Chronic pain disorder 12/18/2013     Hypertension 06/12/2013       LOS (days): 4  Geometric Mean LOS (GMLOS) (days): 4.4  Days to GMLOS:0.5     OBJECTIVE:  Risk of Unplanned Readmission Score: 34.34         Current admission status: Inpatient   Preferred Pharmacy:   [facility] [place] - [address]  [address]  [facility] [address]  Phone: [phone] Fax: [phone]    [facility] [place] - [address]  [facility],  [address]  [facility],  First Floor [facility] [address]  Phone: [phone] Fax: [phone]    [facility] [place], [place] - [address]  [address]  [place] [address]  Phone: [phone] Fax: [phone]    Primary Care Provider: NANY Pollock    Primary Insurance: [employer] [facility]  Secondary Insurance:     DISCHARGE DETAILS:         Additional Comments: [facility] spoke with Corinne and [facility]. CM advised patient is refusing rehab. CM faxed PT OT notes to Corinne to review her current level of function to determine if she is allowed back at [facility] or needs to discharge to rehab. CM awaiting reply from Corinne.    [facility] [phone]  [place]: [phone], fax [phone]

## 2024-10-15 NOTE — ASSESSMENT & PLAN NOTE
Wt Readings from Last 3 Encounters:   10/15/24 76 kg (167 lb 8.8 oz)   10/02/24 85.5 kg (188 lb 9.6 oz)   09/26/24 86.2 kg (190 lb)     79-year-old female was admitted due to volume overload, weight gain, and edema secondary to acute on chronic diastolic congestive heart failure.  Diuretic management per cardiology. May possibly be transitioned to po diuretics within the next 24 hours  Daily weights, I's and O's

## 2024-10-15 NOTE — TELEPHONE ENCOUNTER
Discussed in office with Dr. Ramirez. Will proceed with TOV next week. Patient will no longer be a candidate for botox so further appointments will be canceled. Will schedule a routine follow up with Teresa AYON for symptom follow up

## 2024-10-15 NOTE — RESTORATIVE TECHNICIAN NOTE
Restorative Technician Note      Patient Name: Mattie Sureshcoopergisell     Restorative Tech Visit Date: 10/15/24  Note Type: Mobility  Patient Position Upon Consult: Supine  Activity Performed: Ambulated  Assistive Device: Roller walker  Patient Position at End of Consult: Bedside chair; All needs within reach; Bed/Chair alarm activated        ns

## 2024-10-15 NOTE — PLAN OF CARE
Problem: OCCUPATIONAL THERAPY ADULT  Goal: Performs self-care activities at highest level of function for planned discharge setting.  See evaluation for individualized goals.  Description: Treatment Interventions: ADL retraining, Functional transfer training, UE strengthening/ROM, Endurance training, Patient/family training, Neuromuscular reeducation, Equipment evaluation/education, Compensatory technique education, Activityengagement, Energy conservation          See flowsheet documentation for full assessment, interventions and recommendations.   Outcome: Progressing  Note: Limitation: Decreased ADL status, Decreased UE strength, Decreased Safe judgement during ADL, Decreased endurance, Decreased self-care trans, Decreased high-level ADLs  Prognosis: Good  Assessment: Pt seen for 30 min tx session with focus on functional balance, functional mobility, ADL status, transfer safety, b/l UE ROM, and cognition. Pt able to tolerate OOB mobility; sitting balance=f+/f, standing balance=f/f-. Pt required verbal/physical assistance to maintain transfer safety. Pt demonstrating need for assistance with LE ADLs. Pt able to demonstrate good b/l UE AROM and cognition(i.e.orientation, memory). BP's stable with functional mobility tasks. Tx tolerated well. The patient's raw score on the AM-PAC Daily Activity Inpatient Short Form is 18. A raw score of less than 19 suggests the patient may benefit from discharge to post-acute rehabilitation services. Please refer to the recommendation of the Occupational Therapist for safe discharge planning.     Rehab Resource Intensity Level, OT: II (Moderate Resource Intensity)

## 2024-10-15 NOTE — ASSESSMENT & PLAN NOTE
Lab Results   Component Value Date    HGBA1C 7.4 (H) 10/12/2024     Recent Labs     10/14/24  1141 10/14/24  1557 10/14/24  2054 10/15/24  0707   POCGLU 195* 189* 275* 196*   Continue sliding scale

## 2024-10-15 NOTE — OCCUPATIONAL THERAPY NOTE
Occupational Therapy Progress Note     Patient Name: Mattie Varela  Today's Date: 10/15/2024  Problem List  Principal Problem:    Acute on chronic diastolic congestive heart failure ([facility])  Active Problems:    Degenerative lumbar spinal stenosis    Type 2 diabetes mellitus with other skin complications ([facility])    Chronic pain disorder    Hypertension    Hypothyroidism    Overactive bladder    Cerebrovascular accident (CVA), unspecified mechanism ([facility])    Chronic obstructive pulmonary disease, unspecified COPD type ([facility])    SADAF (acute kidney injury) ([facility])    Chest pain    Acute cystitis    Acute low back pain            10/15/24 0927   Note Type   Note Type Treatment   Pain Assessment   Pain Assessment Tool 0-10   Pain Score 10 - Worst Possible Pain   Pain Location/Orientation Orientation: Lower;Location: Back   Pain Rating: FLACC (Rest) - Face 0   Pain Rating: FLACC (Rest) - Legs 0   Pain Rating: FLACC (Rest) - Activity 0   Pain Rating: FLACC (Rest) - Cry 1   Pain Rating: FLACC (Rest) - Consolability 0   Score: FLACC (Rest) 1   Restrictions/Precautions   Weight Bearing Precautions Per Order No   Other Precautions Fall Risk;Pain;Chair Alarm;Bed Alarm   ADL   Where Assessed Edge of bed   Eating Assistance 6  Modified independent   Grooming Assistance 6  Modified Independent   UB Bathing Assistance 5  Supervision/Setup   LB Bathing Assistance 3  Moderate Assistance   UB Dressing Assistance 5  Supervision/Setup   LB Dressing Assistance 2  Maximal Assistance   Functional Standing Tolerance   Time 2-3mins   Bed Mobility   Rolling R 3  Moderate assistance   Additional items Assist x 1;Increased time required;Verbal cues;LE management   Rolling L 3  Moderate assistance   Additional items Assist x 1;Increased time required;Verbal cues;LE management   Supine to Sit 3  Moderate assistance   Additional items Assist x 1;Increased time required;Verbal cues;LE management   Transfers   Sit to Stand 4  Minimal assistance  "  Additional items Assist x 2;Increased time required;Verbal cues   Stand to Sit 4  Minimal assistance   Additional items Assist x 2;Increased time required;Verbal cues   Additional Comments bp's=142/78(EOB), 152/73(standing)   Functional Mobility   Functional Mobility 4  Minimal assistance   Additional Comments x2   Additional items Rolling walker   Therapeutic Exercise - ROM   UE-ROM Yes   Subjective   Subjective \"I usually get around better than this.\"   Cognition   Overall Cognitive Status WFL   Arousal/Participation Alert   Attention Attends with cues to redirect   Orientation Level Oriented X4   Memory Decreased recall of precautions   Following Commands Follows one step commands without difficulty   Activity Tolerance   Activity Tolerance Patient limited by fatigue;Patient limited by pain   Medical Staff Made Aware nsg, P.T.  (Co-tx with P.T. 2* need for additional skilled intervention assisting to improve pt handling, transfer safety, and improved functional status)   Assessment   Assessment Pt seen for 30 min tx session with focus on functional balance, functional mobility, ADL status, transfer safety, b/l UE ROM, and cognition. Pt able to tolerate OOB mobility; sitting balance=f+/f, standing balance=f/f-. Pt required verbal/physical assistance to maintain transfer safety. Pt demonstrating need for assistance with LE ADLs. Pt able to demonstrate good b/l UE AROM and cognition(i.e.orientation, memory). BP's stable with functional mobility tasks. Tx tolerated well. The patient's raw score on the AM-PAC Daily Activity Inpatient Short Form is 18. A raw score of less than 19 suggests the patient may benefit from discharge to post-acute rehabilitation services. Please refer to the recommendation of the Occupational Therapist for safe discharge planning.   Plan   Treatment Interventions ADL retraining;Functional transfer training;UE strengthening/ROM;Endurance training;Cognitive reorientation;Patient/family " training;Equipment evaluation/education;Compensatory technique education;Continued evaluation   Goal Expiration Date 10/27/24   OT Treatment Day 1   OT Frequency 3-5x/wk   Discharge Recommendation   Rehab Resource Intensity Level, OT II (Moderate Resource Intensity)   AM-PAC Daily Activity Inpatient   Lower Body Dressing 2   Bathing 2   Toileting 3   Upper Body Dressing 3   Grooming 4   Eating 4   Daily Activity Raw Score 18   Daily Activity Standardized Score (Calc for Raw Score >=11) 38.66   AM-PAC Applied Cognition Inpatient   Following a Speech/Presentation 4   Understanding Ordinary Conversation 4   Taking Medications 3   Remembering Where Things Are Placed or Put Away 3   Remembering List of 4-5 Errands 3   Taking Care of Complicated Tasks 3   Applied Cognition Raw Score 20   Applied Cognition Standardized Score 41.76     Rob Haque

## 2024-10-15 NOTE — PROGRESS NOTES
Progress Note - Hospitalist   Name: Mattie Varela 79 y.o. female I MRN: [id number]  Unit/Bed#: E5 -01 I Date of Admission: 10/11/2024   Date of Service: 10/15/2024 I Hospital Day: 4    Assessment & Plan  Acute on chronic diastolic congestive heart failure (HCC)  Wt Readings from Last 3 Encounters:   10/15/24 76 kg (167 lb 8.8 oz)   10/02/24 85.5 kg (188 lb 9.6 oz)   09/26/24 86.2 kg (190 lb)     79-year-old female was admitted due to volume overload, weight gain, and edema secondary to acute on chronic diastolic congestive heart failure.  Diuretic management per cardiology. May possibly be transitioned to po diuretics within the next 24 hours  Daily weights, I's and O's  SADAF (acute kidney injury) (HCC)  Results from last 7 days   Lab Units 10/15/24  0449 10/14/24  0457 10/13/24  0554 10/12/24  0535   CREATININE mg/dL 1.50* 1.33* 1.43* 1.50*   Baseline 1.1-1.3.  Etiology suspected to be due to cardiorenal.  Improving.    Acute low back pain  Complaining of low back pain  No red flags.  No acute fractures and CT spine lumbar without contrast study  Continue supportive care  Degenerative lumbar spinal stenosis  Status post spinal stimulator, although daughter stated that this is no longer working.  Utilizes baclofen as needed and Percocet 10 mg 3 times daily as needed  Type 2 diabetes mellitus with other skin complications (HCC)  Lab Results   Component Value Date    HGBA1C 7.4 (H) 10/12/2024     Recent Labs     10/14/24  1141 10/14/24  1557 10/14/24  2054 10/15/24  0707   POCGLU 195* 189* 275* 196*   Continue sliding scale  Chronic pain disorder  Reviewed PDMP as well as facility paperwork  Prescribed Percocet 10-3 25 prn  Chest pain  Appreciate cardiology recommendations.  No evidence of ACS at this time.  Hypertension  Continue metoprolol succinate 50 mg BID    Hypothyroidism  Continue Synthroid  Acute cystitis  Patient was diagnosed with acute cystitis as an outpatient was started on ciprofloxacin 7-day  course through 10/15/2024  Continue renal adjusted dose through 10/15/2024  Overactive bladder  Received Botox injections in the past  Urinary retention protocol, ahn catheter was placed by urology overnight for additional symptom relief. >700 cc's drained. No suprpubic tenderness at this time.  Cerebrovascular accident (CVA), unspecified mechanism (HCC)  Continue aspirin, statin  Chronic obstructive pulmonary disease, unspecified COPD type (HCC)  Not in acute exacerbation  Continue albuterol as needed    VTE Pharmacologic Prophylaxis: VTE Score: 6 Moderate Risk (Score 3-4) - Pharmacological DVT Prophylaxis Ordered: heparin.    Mobility:   Basic Mobility Inpatient Raw Score: 14  -[facility] Goal: 4: Move to chair/commode  -[facility] Achieved: 2: Bed activities/Dependent transfer    Discussions with Specialists or Other Care Team Provider: nursing, urology    Education and Discussions with Family / Patient: patient, daughter    Current Length of Stay: 4 day(s)  Current Patient Status: Inpatient   Certification Statement: The patient will continue to require additional inpatient hospital stay due to diuretic management, cards reeval, dispo planning  Discharge Plan: 24 hours rehab?    Code Status: Level 1 - Full Code    Subjective   Patient seen and examined. Reports some clinical improvement after ahn catheter placement and pain regimen overnight. She was able to do standing scale and work a bit with physical therapy today.     Objective   Vitals:   Temp (24hrs), Av.1 °F (36.7 °C), Min:97.4 °F (36.3 °C), Max:99 °F (37.2 °C)    Temp:  [97.4 °F (36.3 °C)-99 °F (37.2 °C)] 97.4 °F (36.3 °C)  HR:  [84-94] 84  Resp:  [16-19] 16  BP: (124-133)/(62-69) 133/62  SpO2:  [93 %-95 %] 95 %  Body mass index is 32.72 kg/m².     Input and Output Summary (last 24 hours):     Intake/Output Summary (Last 24 hours) at 10/15/2024 0909  Last data filed at 10/15/2024 0965  Gross per 24 hour   Intake 240 ml   Output 1207 ml   Net -967 ml        Physical Exam  Vitals reviewed.   Constitutional:       General: She is not in acute distress.  HENT:      Head: Normocephalic.      Nose: Nose normal.      Mouth/Throat:      Mouth: Mucous membranes are moist.   Eyes:      General: No scleral icterus.  Cardiovascular:      Rate and Rhythm: Normal rate.   Pulmonary:      Effort: Pulmonary effort is normal. No respiratory distress.      Breath sounds: No wheezing.   Abdominal:      General: There is no distension.      Palpations: Abdomen is soft.      Tenderness: There is no abdominal tenderness.   Skin:     General: Skin is warm.   Neurological:      Mental Status: She is alert.   Psychiatric:         Mood and Affect: Mood normal.         Behavior: Behavior normal.       Lines/Drains:  Lines/Drains/Airways       Active Status       Name Placement date Placement time Site Days    Urethral Catheter Latex 16 Fr. 10/14/24  1507  Latex  less than 1                  Urinary Catheter:  Goal for removal: N/A- Discharging with [place]                 Lab Results: I have reviewed the following results:   Results from last 7 days   Lab Units 10/15/24  0449 10/13/24  0554 10/12/24  0535   WBC Thousand/uL 8.42 8.28 8.56   HEMOGLOBIN g/dL 11.3* 11.9 10.8*   PLATELETS Thousands/uL 268 267 265   MCV fL 89 91 90     Results from last 7 days   Lab Units 10/15/24  0449 10/14/24  0457 10/13/24  0554 10/12/24  0535 10/11/24  1228   SODIUM mmol/L 135 135 138   < > 136   POTASSIUM mmol/L 3.8 4.1 4.5   < > 5.0   CHLORIDE mmol/L 91* 92* 95*   < > 99   CO2 mmol/L 36* 34* 35*   < > 30   ANION GAP mmol/L 8 9 8   < > 7   BUN mg/dL 30* 25 28*   < > 29*   CREATININE mg/dL 1.50* 1.33* 1.43*   < > 1.65*   CALCIUM mg/dL 9.6 9.7 9.5   < > 9.4   ALBUMIN g/dL  --   --   --   --  3.8   TOTAL BILIRUBIN mg/dL  --   --   --   --  0.34   ALK PHOS U/L  --   --   --   --  123*   ALT U/L  --   --   --   --  7   AST U/L  --   --   --   --  17   EGFR ml/min/1.73sq m 32 37 34   < > 28   GLUCOSE RANDOM mg/dL  178* 184* 213*   < > 128    < > = values in this interval not displayed.     Results from last 7 days   Lab Units 10/15/24  0449 10/12/24  0535   MAGNESIUM mg/dL 2.0 2.2   PHOSPHORUS mg/dL 4.9*  --          Results from last 7 days   Lab Units 10/11/24  1455 10/11/24  1228   HS TNI 0HR ng/L  --  10   HS TNI 2HR ng/L 8  --               Results from last 7 days   Lab Units 10/15/24  0707 10/14/24  2054 10/14/24  1557 10/14/24  1141 10/14/24  0732 10/13/24  2057 10/13/24  1621 10/13/24  1053 10/13/24  0658 10/12/24  2129 10/12/24  1605 10/12/24  1112   POC GLUCOSE mg/dl 196* 275* 189* 195* 164* 171* 148* 198* 157* 143* 210* 244*     Results from last 7 days   Lab Units 10/12/24  0535   HEMOGLOBIN A1C % 7.4*           Recent Cultures (last 7 days):         Imaging:  Reviewed radiology reports from this admission: no new imaging    Last 24 Hours Medication List:     Current Facility-Administered Medications:     acetaminophen (TYLENOL) tablet 975 mg, Q8H ARY    albuterol (PROVENTIL HFA,VENTOLIN HFA) inhaler 2 puff, Q6H PRN    aluminum-magnesium hydroxide-simethicone (MAALOX) oral suspension 30 mL, Q6H PRN    aspirin chewable tablet 81 mg, Daily    atorvastatin (LIPITOR) tablet 40 mg, Daily    baclofen tablet 10 mg, BID PRN    ciprofloxacin (CIPRO) tablet 500 mg, Q24H    ferrous sulfate tablet 325 mg, Daily With Dinner    heparin (porcine) subcutaneous injection 5,000 Units, Q8H ARY    HYDROmorphone HCl (DILAUDID) injection 0.2 mg, Q6H PRN    insulin lispro (HumALOG/ADMELOG) 100 units/mL subcutaneous injection 1-5 Units, 4x Daily (AC & HS) **AND** Fingerstick Glucose (POCT), 4x Daily AC and at bedtime    latanoprost (XALATAN) 0.005 % ophthalmic solution 1 drop, HS    levothyroxine tablet 37.5 mcg, Early Morning    lidocaine (LIDODERM) 5 % patch 1 patch, Daily    magnesium Oxide (MAG-OX) tablet 400 mg, BID    meclizine (ANTIVERT) tablet 25 mg, Daily PRN    melatonin tablet 6 mg, HS    metoprolol succinate (TOPROL-XL) 24  hr tablet 50 mg, Q12H ARY    Milnacipran HCl TABS 100 mg, Daily    morphine injection 2 mg, Q4H PRN    ondansetron (ZOFRAN) injection 4 mg, Q6H PRN    oxyCODONE (ROXICODONE) immediate release tablet 10 mg, TID PRN    pantoprazole (PROTONIX) EC tablet 40 mg, Daily Before Breakfast    polyethylene glycol (MIRALAX) packet 17 g, Daily    senna-docusate sodium (SENOKOT S) 8.6-50 mg per tablet 1 tablet, BID    zolpidem (AMBIEN) tablet 5 mg, HS PRN      **Please Note: This note may have been constructed using a voice recognition system.**

## 2024-10-15 NOTE — PHYSICAL THERAPY NOTE
PHYSICAL THERAPY NOTE          Patient Name: Mattie Varela  Today's Date: 10/15/2024    10/15/24 0920   Note Type   Note Type Treatment   Pain Assessment   Pain Assessment Tool 0-10   Pain Score 10 - Worst Possible Pain   Pain Location/Orientation Location: Back   Hospital Pain Intervention(s) Repositioned;Ambulation/increased activity;Emotional support   Restrictions/Precautions   Weight Bearing Precautions Per Order No   Other Precautions Bed Alarm;Chair Alarm;Pain;Fall Risk   General   Chart Reviewed Yes   Family/Caregiver Present No   Cognition   Overall Cognitive Status WFL   Arousal/Participation Cooperative   Attention Within functional limits   Orientation Level Oriented X4   Memory Within functional limits   Following Commands Follows one step commands without difficulty   Subjective   Subjective I would like to start with some exercises with my legs  first to warm- up.   Bed Mobility   Rolling L 3  Moderate assistance   Additional items Assist x 1;Bedrails;HOB elevated;Increased time required;Verbal cues;LE management   Supine to Sit 3  Moderate assistance   Additional items Assist x 1;Increased time required;Verbal cues;LE management   Transfers   Sit to Stand 4  Minimal assistance   Additional items Assist x 2;Increased time required;Verbal cues   Stand to Sit 4  Minimal assistance   Additional items Assist x 2;Armrests;Increased time required;Verbal cues   Additional Comments verbal cues for hand placement   Ambulation/Elevation   Gait pattern Forward Flexion;Decreased foot clearance;Short stride;Step to;Excessively slow;Step through pattern   Gait Assistance 4  Minimal assist   Additional items Assist x 2;Verbal cues   Assistive Device Other (Comment)  (rollator walker)   Distance 15' x1 with chair follow   Balance   Static Sitting Fair +   Dynamic Sitting Fair   Static Standing Fair -  (w rollator)   Dynamic Standing  Poor +   Ambulatory Poor +  (w rollator walker)   Endurance Deficit   Endurance Deficit Yes   Endurance Deficit Description fatigue, pain, generalized weakness   Activity Tolerance   Activity Tolerance Patient limited by pain;Patient limited by fatigue   Exercises   Quad Sets Supine;10 reps;AROM;Bilateral   Heelslides Supine;10 reps;AROM;Bilateral   Hip Abduction Supine;10 reps;AROM;Bilateral   Hip Adduction Supine;10 reps;AROM;Bilateral   Ankle Pumps Supine;20 reps;AROM;Bilateral   Assessment   Prognosis Fair   Problem List Decreased strength;Decreased endurance;Impaired balance;Decreased mobility;Obesity;Orthopedic restrictions;Pain   Assessment Pt seen for PT treatment session this date with interventions consisting of bed mobility, transfer training, gait training, and HEP, and education provided as needed for safety and direction to improve functional mobility, safety awareness, and activity tolerance. Pt agreeable to PT treatment session upon arrival, pt found supine in bed . At end of session, pt left  seated out of bed in recliner with all needs in reach. In comparison to previous session, pt with improvement in activity tolerance, endurance, standing balance, ambulation distances, ambulatory balance, AM- pac score, and functional mobility. Pt  is showing improved tolerance to activity and endurance with progress this session.  Pt is currently functioning at  mod assist x1 for supine to sit with head of bed elevated and use of bed rail.  Sit to stand transfers with min assist x2 with verbal cues for hand placement. Pt  progressed with ambulation.  Pt  ambulates 15' with min assist x2 with close chair follow due to low back pain and fatigue. Verbal cues for improved gait quality, pt  initially taking short strides with decreased foot  clearance and step to gait  progressing to longer strides with step through gait. Pt  performs supine b/l le arom exercises x 10- 20 reps to tolerance. Increased time to perform  and complete all mobility required due to low back pain.  Pt  is functioning well below baseline as far as ambulation distances and assistance required for all functional mobility including bed mobility,  transfers and ambulation with use of rollator walker.    Please refer to endurance deficit section of flowsheet for vitals. Continue to recommend  level II moderate rehab resource intensity (unless [facility] is able to care for pt at current level of mobilty then home with HHPT)  at time of d/c in order to maximize pt's functional independence and safety w/ mobility. Pt continues to be functioning below baseline level. PT will continue to see pt while here in order to address the deficits listed above and provide interventions consistent w/ POC in effort to achieve STGs.    The patient's AM-PAC Basic Mobility Inpatient Short Form Raw Score is 14. A raw score less than 16 suggests the patient may benefit from discharge to post-acute rehabilitation services. Please also refer to the recommendation of the Physical Therapist for safe discharge planning.   Goals   Patient Goals to walk with my rollator walker.   STG Expiration Date 10/27/24   PT Treatment Day 2   Plan   Treatment/Interventions Functional transfer training   Progress Progressing toward goals   PT Frequency 3-5x/wk   Discharge Recommendation   Rehab Resource Intensity Level, PT II (Moderate Resource Intensity)   Additional Comments unless [facility] is able to care for pt at current level of mobilty then home with HHPT.   AM-PAC Basic Mobility Inpatient   Turning in Flat Bed Without Bedrails 2   Lying on Back to Sitting on Edge of Flat Bed Without Bedrails 2   Moving Bed to Chair 3   Standing Up From Chair Using Arms 3   Walk in Room 3   Climb 3-5 Stairs With Railing 1   Basic Mobility Inpatient Raw Score 14   Basic Mobility Standardized Score 35.55   [facility] Highest Level Of Mobility   -[facility] Goal 4: Move to chair/commode   -[facility] Achieved 6: Walk 10 steps or  more   Education   Education Provided Mobility training;Home exercise program;Assistive device   Patient Demonstrates acceptance/verbal understanding   End of Consult   Patient Position at End of Consult Bed/Chair alarm activated;All needs within reach;Bedside chair   Bettye Bullock, PTA

## 2024-10-15 NOTE — PLAN OF CARE
Problem: Potential for Falls  Goal: Patient will remain free of falls  Description: INTERVENTIONS:  - Educate patient/family on patient safety including physical limitations  - Instruct patient to call for assistance with activity   - Consult OT/PT to assist with strengthening/mobility   - Keep Call bell within reach  - Keep bed low and locked with side rails adjusted as appropriate  - Keep care items and personal belongings within reach  - Initiate and maintain comfort rounds  - Make Fall Risk Sign visible to staff  - Offer Toileting every 2 Hours, in advance of need  - Initiate/Maintain bed alarm  - Apply yellow socks and bracelet for high fall risk patients  - Consider moving patient to room near nurses station  Outcome: Progressing     Problem: PAIN - ADULT  Goal: Verbalizes/displays adequate comfort level or baseline comfort level  Description: Interventions:  - Encourage patient to monitor pain and request assistance  - Assess pain using appropriate pain scale  - Administer analgesics based on type and severity of pain and evaluate response  - Implement non-pharmacological measures as appropriate and evaluate response  - Consider cultural and social influences on pain and pain management  - Notify physician/advanced practitioner if interventions unsuccessful or patient reports new pain  Outcome: Progressing     Problem: INFECTION - ADULT  Goal: Absence or prevention of progression during hospitalization  Description: INTERVENTIONS:  - Assess and monitor for signs and symptoms of infection  - Monitor lab/diagnostic results  - Monitor all insertion sites, i.e. indwelling lines, tubes, and drains  - Monitor endotracheal if appropriate and nasal secretions for changes in amount and color  - [place] appropriate cooling/warming therapies per order  - Administer medications as ordered  - Instruct and encourage patient and family to use good hand hygiene technique  - Identify and instruct in appropriate isolation  precautions for identified infection/condition  Outcome: Progressing  Goal: Absence of fever/infection during neutropenic period  Description: INTERVENTIONS:  - Monitor WBC    Outcome: Progressing     Problem: SAFETY ADULT  Goal: Patient will remain free of falls  Description: INTERVENTIONS:  - Educate patient/family on patient safety including physical limitations  - Instruct patient to call for assistance with activity   - Consult OT/PT to assist with strengthening/mobility   - Keep Call bell within reach  - Keep bed low and locked with side rails adjusted as appropriate  - Keep care items and personal belongings within reach  - Initiate and maintain comfort rounds  - Make Fall Risk Sign visible to staff  - Offer Toileting every 2 Hours, in advance of need  - Initiate/Maintain bed alarm  - Apply yellow socks and bracelet for high fall risk patients  - Consider moving patient to room near nurses station  Outcome: Progressing  Goal: Maintain or return to baseline ADL function  Description: INTERVENTIONS:  - Educate patient/family on patient safety including physical limitations  - Instruct patient to call for assistance with activity   - Consult OT/PT to assist with strengthening/mobility   - Keep Call bell within reach  - Keep bed low and locked with side rails adjusted as appropriate  - Keep care items and personal belongings within reach  - Initiate and maintain comfort rounds  - Make Fall Risk Sign visible to staff  - Offer Toileting every 2 Hours, in advance of need  - Initiate/Maintain bed alarm  - Apply yellow socks and bracelet for high fall risk patients  - Consider moving patient to room near nurses station  Outcome: Progressing  Goal: Maintains/Returns to pre admission functional level  Description: INTERVENTIONS:  - Perform AM-PAC 6 Click Basic Mobility/ Daily Activity assessment daily.  - Set and communicate daily mobility goal to care team and patient/family/caregiver.   - Collaborate with  rehabilitation services on mobility goals if consulted  - Out of bed for toileting  - Record patient progress and toleration of activity level   Outcome: Progressing     Problem: DISCHARGE PLANNING  Goal: Discharge to home or other facility with appropriate resources  Description: INTERVENTIONS:  - Identify barriers to discharge w/patient and caregiver  - Arrange for needed discharge resources and transportation as appropriate  - Identify discharge learning needs (meds, wound care, etc.)  - Arrange for interpretive services to assist at discharge as needed  - Refer to Case Management Department for coordinating discharge planning if the patient needs post-hospital services based on physician/advanced practitioner order or complex needs related to functional status, cognitive ability, or social support system  Outcome: Progressing     Problem: Knowledge Deficit  Goal: Patient/family/caregiver demonstrates understanding of disease process, treatment plan, medications, and discharge instructions  Description: Complete learning assessment and assess knowledge base.  Interventions:  - Provide teaching at level of understanding  - Provide teaching via preferred learning methods  Outcome: Progressing     Problem: Prexisting or High Potential for Compromised Skin Integrity  Goal: Skin integrity is maintained or improved  Description: INTERVENTIONS:  - Identify patients at risk for skin breakdown  - Assess and monitor skin integrity  - Assess and monitor nutrition and hydration status  - Monitor labs   - Assess for incontinence   - Turn and reposition patient  - Assist with mobility/ambulation  - Relieve pressure over bony prominences  - Avoid friction and shearing  - Provide appropriate hygiene as needed including keeping skin clean and dry  - Evaluate need for skin moisturizer/barrier cream  - Collaborate with interdisciplinary team   - Patient/family teaching  - Consider wound care consult   Outcome: Progressing      Problem: Nutrition/Hydration-ADULT  Goal: Nutrient/Hydration intake appropriate for improving, restoring or maintaining nutritional needs  Description: Monitor and assess patient's nutrition/hydration status for malnutrition. Collaborate with interdisciplinary team and initiate plan and interventions as ordered.  Monitor patient's weight and dietary intake as ordered or per policy. Utilize nutrition screening tool and intervene as necessary. Determine patient's food preferences and provide high-protein, high-caloric foods as appropriate.     INTERVENTIONS:  - Monitor oral intake, urinary output, labs, and treatment plans  - Assess nutrition and hydration status and recommend course of action  - Evaluate amount of meals eaten  - Assist patient with eating if necessary   - Allow adequate time for meals  - Recommend/ encourage appropriate diets, oral nutritional supplements, and vitamin/mineral supplements  - Order, calculate, and assess calorie counts as needed  - Recommend, monitor, and adjust tube feedings and TPN/PPN based on assessed needs  - Assess need for intravenous fluids  - Provide specific nutrition/hydration education as appropriate  - Include patient/family/caregiver in decisions related to nutrition  Outcome: Progressing

## 2024-10-15 NOTE — ASSESSMENT & PLAN NOTE
Results from last 7 days   Lab Units 10/15/24  0449 10/14/24  0457 10/13/24  0554 10/12/24  0535   CREATININE mg/dL 1.50* 1.33* 1.43* 1.50*   Baseline 1.1-1.3.  Etiology suspected to be due to cardiorenal.  Improving.

## 2024-10-15 NOTE — PROGRESS NOTES
Cardiology Progress Note - Mattie Varela 79 y.o. female MRN: [id number]    Unit/Bed#: E5 -01 Encounter: [id number]      Assessment & Plan:    Acute on chronic diastolic congestive heart failure (HCC)  -TTE 5/23/2024 showed EF 70%, grade 1 DD, mild dynamic LVOT obstruction with a peak gradient of 25 mmHg with Valsalva, mild TR  - BNP 55 on admission  - Chest x-ray on admission showed no acute cardiopulmonary disease  - CT chest abdomen pelvis 10/11/2024 showed no evidence of consolidation or pleural effusion  - Discharge weight 5/30/2024 166 pounds, office weight 7/24/2024 176 pounds, weight on admission was 191 pounds  - Outpatient diuretic Rx furosemide 20 mg p.o. daily-> changed to torsemide 30 mg daily on 9/26/2024 due to weight gain  - Current diuretic Rx furosemide 60 mg IV twice daily-> discontinued this morning    Precordial chest pain  - Patient midsternal chest discomfort radiating to her right side prior to admission  - No acute ischemic changes on ECG  - Troponins negative x 2 on admission  - No evidence of ACS at this time    Coronary artery disease  - LHC 11/14/2023 showed diffuse calcified CAD with no focal lesions, OM1 70% with normal IFR, no interventions performed  - Continue with aspirin 81 mg daily    Hypertension  - Outpatient Rx Toprol-XL 50 mg twice daily    Hyperlipidemia  - Continue with atorvastatin 40 mg daily    Cerebrovascular accident (CVA), unspecified mechanism (HCC)  -CTA November 2023 showed moderate stenosis of the distal right MCA M1, focal moderate stenosis involving the inferior basilar artery with a diffuse tubular dilation at the right ICA origin  - Continue with aspirin 81 mg daily and atorvastatin 40 mg daily  - Underwent Medtronic loop recorder implantation on 3/2/2024  - No recorded events as of last interrogation on 9/17/2024    Degenerative lumbar spinal stenosis    Type 2 diabetes mellitus with other skin complications (HCC)    Chronic pain disorder    Chronic  obstructive pulmonary disease, unspecified COPD type ([facility])    SADAF (acute kidney injury) ([facility])    Acute cystitis    Hypothyroidism    Overactive bladder     Summary:  -Patient had a standing weight for the first time this admission and her weight is significantly decreased   -BUN/creatinine trended up today and no significant volume on exam  -Will discontinue IV furosemide today, tentative plan to resume torsemide at 20 mg p.o. twice daily starting tomorrow if labs improve  - Check daily standing weights  - Monitor creatinine and electrolytes closely       Subjective:   No significant events overnight.  Continues to have severe pain throughout her body and her back.  Reports little bit of improvement post [place] placement last night.  Clinically she appears improved and she was able to be set up and stood up with PT this morning.  Denies chest pain, abdominal pain, nausea, vomiting, fever, chills, headache, dizziness or palpitations.    Objective:     Vitals: Blood pressure 142/78, pulse 96, temperature (!) 97.4 °F (36.3 °C), temperature source Oral, resp. rate 16, height 5' (1.524 m), weight 76 kg (167 lb 8.8 oz), SpO2 95%, not currently breastfeeding., Body mass index is 32.72 kg/m².,   Orthostatic Blood Pressures      Flowsheet Row Most Recent Value   Blood Pressure 142/78 filed at 10/15/2024 0936   Patient Position - Orthostatic VS Lying filed at 10/15/2024 0705              Intake/Output Summary (Last 24 hours) at 10/15/2024 0944  Last data filed at 10/15/2024 0925  Gross per 24 hour   Intake 240 ml   Output 1207 ml   Net -967 ml           Physical Exam:    GEN: Mattiesa CARLEY Varela appears well, alert and oriented x 3, pleasant and cooperative   HEENT: Mucous membranes moist, no scleral icterus, no conjunctival pallor  NECK: Trace JVD  HEART: Regular rate and rhythm, normal S1 and S2, 3/6 systolic murmur  LUNGS: Decreased breath sounds at bases bilaterally  ABDOMEN: normal bowel sounds, soft, no tenderness, no  distention  EXTREMITIES: peripheral pulses normal; trace bilateral lower extremity edema  NEURO: no focal findings   SKIN: No lesions or rashes on exposed skin        Current Facility-Administered Medications:     acetaminophen (TYLENOL) tablet 975 mg, 975 mg, Oral, Q8H [facility], Paco Carmona MD, 975 mg at 10/15/24 0513    albuterol (PROVENTIL HFA,VENTOLIN HFA) inhaler 2 puff, 2 puff, Inhalation, Q6H PRN, Alexys Angel DO    aluminum-magnesium hydroxide-simethicone (MAALOX) oral suspension 30 mL, 30 mL, Oral, Q6H PRN, Alexys Angel DO    aspirin chewable tablet 81 mg, 81 mg, Oral, Daily, Alexys Angel DO, 81 mg at 10/15/24 0752    atorvastatin (LIPITOR) tablet 40 mg, 40 mg, Oral, Daily, Alexys Angel DO, 40 mg at 10/15/24 0751    baclofen tablet 10 mg, 10 mg, Oral, BID PRN, Alexys Angel DO, 10 mg at 10/14/24 2138    ciprofloxacin (CIPRO) tablet 500 mg, 500 mg, Oral, Q24H, Alexys Angel DO, 500 mg at 10/14/24 1154    ferrous sulfate tablet 325 mg, 325 mg, Oral, Daily With Dinner, Alexys Angel DO, 325 mg at 10/14/24 1539    heparin (porcine) subcutaneous injection 5,000 Units, 5,000 Units, Subcutaneous, Q8H [facility], Alexys Angel DO, 5,000 Units at 10/15/24 0517    HYDROmorphone HCl (DILAUDID) injection 0.2 mg, 0.2 mg, Intravenous, Q6H PRN, Paco Carmona MD    insulin lispro (HumALOG/ADMELOG) 100 units/mL subcutaneous injection 1-5 Units, 1-5 Units, Subcutaneous, 4x Daily (AC & HS), 1 Units at 10/15/24 0746 **AND** Fingerstick Glucose (POCT), , , 4x Daily AC and at bedtime, Alexys Angel DO    latanoprost (XALATAN) 0.005 % ophthalmic solution 1 drop, 1 drop, Both Eyes, HS, Alexys Angel DO, 1 drop at 10/14/24 2126    levothyroxine tablet 37.5 mcg, 37.5 mcg, Oral, Early Morning, Alexys Angel DO, 37.5 mcg at 10/15/24 0513    lidocaine (LIDODERM) 5 % patch 1 patch, 1 patch, Topical, Daily, Susannah Mcclain PA-C, 1 patch at 10/15/24 0751    magnesium Oxide (MAG-OX) tablet  400 mg, 400 mg, Oral, BID, Alexys Angel DO, 400 mg at 10/15/24 0751    meclizine (ANTIVERT) tablet 25 mg, 25 mg, Oral, Daily PRN, Alexys Angel DO    melatonin tablet 6 mg, 6 mg, Oral, HS, Royer Aburto Carballo, DO, 6 mg at 10/14/24 2125    metoprolol succinate (TOPROL-XL) 24 hr tablet 50 mg, 50 mg, Oral, Q12H ARY, Alexys Angel DO, 50 mg at 10/15/24 0751    morphine injection 2 mg, 2 mg, Intravenous, Q4H PRN, Susannah Mcclain PA-C, 2 mg at 10/15/24 0513    ondansetron (ZOFRAN) injection 4 mg, 4 mg, Intravenous, Q6H PRN, Alexys Angel DO, 4 mg at 10/15/24 0506    oxyCODONE (ROXICODONE) immediate release tablet 10 mg, 10 mg, Oral, TID PRN, Susannah Mcclain PA-C, 10 mg at 10/14/24 0846    pantoprazole (PROTONIX) EC tablet 40 mg, 40 mg, Oral, Daily Before Breakfast, Alexys Angel DO, 40 mg at 10/15/24 0513    polyethylene glycol (MIRALAX) packet 17 g, 17 g, Oral, Daily, Alexys Angel DO, 17 g at 10/15/24 0751    senna-docusate sodium (SENOKOT S) 8.6-50 mg per tablet 1 tablet, 1 tablet, Oral, BID, Susannah Mcclain PA-C, 1 tablet at 10/15/24 0752    zolpidem (AMBIEN) tablet 5 mg, 5 mg, Oral, HS PRN, Susannah Mcclain PA-C, 5 mg at 10/13/24 2100    Labs & Results:    Lab Results   Component Value Date    CKTOTAL 50 05/26/2024    TROPONINI 0.04 07/16/2020    TROPONINI 0.06 (H) 07/15/2020       Lab Results   Component Value Date    GLUCOSE 193 (H) 06/04/2018    CALCIUM 9.6 10/15/2024     04/30/2015    K 3.8 10/15/2024    CO2 36 (H) 10/15/2024    CL 91 (L) 10/15/2024    BUN 30 (H) 10/15/2024    CREATININE 1.50 (H) 10/15/2024       Lab Results   Component Value Date    WBC 8.42 10/15/2024    HGB 11.3 (L) 10/15/2024    HCT 35.7 10/15/2024    MCV 89 10/15/2024     10/15/2024           Lab Results   Component Value Date    CHOL 165 04/30/2015    CHOL 208 03/20/2014     Lab Results   Component Value Date    HDL 42 (L) 11/26/2023    HDL 50 11/11/2023     Lab Results   Component Value Date    LDLCALC  42 11/26/2023    LDLCALC 62 11/11/2023     Lab Results   Component Value Date    TRIG 179 (H) 11/26/2023    TRIG 84 11/11/2023       Lab Results   Component Value Date    ALT 7 10/11/2024    AST 17 10/11/2024    ALKPHOS 123 (H) 10/11/2024         EKG personally reviewed by )Natan Guzman MD. No acute changes

## 2024-10-16 LAB
ANION GAP SERPL CALCULATED.3IONS-SCNC: 8 MMOL/L (ref 4–13)
BUN SERPL-MCNC: 39 MG/DL (ref 5–25)
CALCIUM SERPL-MCNC: 9.7 MG/DL (ref 8.4–10.2)
CHLORIDE SERPL-SCNC: 90 MMOL/L (ref 96–108)
CO2 SERPL-SCNC: 34 MMOL/L (ref 21–32)
CREAT SERPL-MCNC: 1.54 MG/DL (ref 0.6–1.3)
GFR SERPL CREATININE-BSD FRML MDRD: 31 ML/MIN/1.73SQ M
GLUCOSE SERPL-MCNC: 208 MG/DL (ref 65–140)
GLUCOSE SERPL-MCNC: 214 MG/DL (ref 65–140)
GLUCOSE SERPL-MCNC: 225 MG/DL (ref 65–140)
GLUCOSE SERPL-MCNC: 312 MG/DL (ref 65–140)
GLUCOSE SERPL-MCNC: 341 MG/DL (ref 65–140)
MAGNESIUM SERPL-MCNC: 2.5 MG/DL (ref 1.9–2.7)
POTASSIUM SERPL-SCNC: 4.1 MMOL/L (ref 3.5–5.3)
SODIUM SERPL-SCNC: 132 MMOL/L (ref 135–147)

## 2024-10-16 PROCEDURE — 99232 SBSQ HOSP IP/OBS MODERATE 35: CPT | Performed by: INTERNAL MEDICINE

## 2024-10-16 PROCEDURE — 83735 ASSAY OF MAGNESIUM: CPT | Performed by: STUDENT IN AN ORGANIZED HEALTH CARE EDUCATION/TRAINING PROGRAM

## 2024-10-16 PROCEDURE — 99232 SBSQ HOSP IP/OBS MODERATE 35: CPT | Performed by: STUDENT IN AN ORGANIZED HEALTH CARE EDUCATION/TRAINING PROGRAM

## 2024-10-16 PROCEDURE — 82948 REAGENT STRIP/BLOOD GLUCOSE: CPT

## 2024-10-16 PROCEDURE — 80048 BASIC METABOLIC PNL TOTAL CA: CPT | Performed by: STUDENT IN AN ORGANIZED HEALTH CARE EDUCATION/TRAINING PROGRAM

## 2024-10-16 RX ORDER — CYCLOBENZAPRINE HCL 10 MG
10 TABLET ORAL ONCE
Status: COMPLETED | OUTPATIENT
Start: 2024-10-16 | End: 2024-10-17

## 2024-10-16 RX ORDER — OXYCODONE HYDROCHLORIDE 10 MG/1
10 TABLET ORAL EVERY 6 HOURS PRN
Status: DISCONTINUED | OUTPATIENT
Start: 2024-10-16 | End: 2024-10-19 | Stop reason: HOSPADM

## 2024-10-16 RX ORDER — DIPHENHYDRAMINE HCL 25 MG
25 TABLET ORAL EVERY 6 HOURS PRN
Status: DISCONTINUED | OUTPATIENT
Start: 2024-10-16 | End: 2024-10-19 | Stop reason: HOSPADM

## 2024-10-16 RX ORDER — TORSEMIDE 20 MG/1
20 TABLET ORAL
Status: DISCONTINUED | OUTPATIENT
Start: 2024-10-17 | End: 2024-10-17

## 2024-10-16 RX ADMIN — MENTHOL 10%, METHYL SALICYLATE 15%: 10; 15 CREAM TOPICAL at 06:14

## 2024-10-16 RX ADMIN — INSULIN LISPRO 3 UNITS: 100 INJECTION, SOLUTION INTRAVENOUS; SUBCUTANEOUS at 17:16

## 2024-10-16 RX ADMIN — Medication 400 MG: at 08:17

## 2024-10-16 RX ADMIN — MORPHINE SULFATE 2 MG: 2 INJECTION, SOLUTION INTRAMUSCULAR; INTRAVENOUS at 03:10

## 2024-10-16 RX ADMIN — FERROUS SULFATE TAB 325 MG (65 MG ELEMENTAL FE) 325 MG: 325 (65 FE) TAB at 17:16

## 2024-10-16 RX ADMIN — LIDOCAINE 1 PATCH: 700 PATCH TOPICAL at 08:17

## 2024-10-16 RX ADMIN — ACETAMINOPHEN 975 MG: 325 TABLET, FILM COATED ORAL at 21:10

## 2024-10-16 RX ADMIN — ZOLPIDEM TARTRATE 5 MG: 5 TABLET, COATED ORAL at 21:15

## 2024-10-16 RX ADMIN — CIPROFLOXACIN 500 MG: 500 TABLET ORAL at 11:58

## 2024-10-16 RX ADMIN — BACLOFEN 10 MG: 10 TABLET ORAL at 21:18

## 2024-10-16 RX ADMIN — INSULIN LISPRO 1 UNITS: 100 INJECTION, SOLUTION INTRAVENOUS; SUBCUTANEOUS at 21:16

## 2024-10-16 RX ADMIN — ASPIRIN 81 MG CHEWABLE TABLET 81 MG: 81 TABLET CHEWABLE at 08:16

## 2024-10-16 RX ADMIN — HEPARIN SODIUM 5000 UNITS: 5000 INJECTION INTRAVENOUS; SUBCUTANEOUS at 21:15

## 2024-10-16 RX ADMIN — Medication 400 MG: at 21:11

## 2024-10-16 RX ADMIN — ACETAMINOPHEN 975 MG: 325 TABLET, FILM COATED ORAL at 04:24

## 2024-10-16 RX ADMIN — SENNOSIDES AND DOCUSATE SODIUM 1 TABLET: 8.6; 5 TABLET ORAL at 17:16

## 2024-10-16 RX ADMIN — METOPROLOL SUCCINATE 50 MG: 50 TABLET, EXTENDED RELEASE ORAL at 08:16

## 2024-10-16 RX ADMIN — METOPROLOL SUCCINATE 50 MG: 50 TABLET, EXTENDED RELEASE ORAL at 21:11

## 2024-10-16 RX ADMIN — LEVOTHYROXINE SODIUM 37.5 MCG: 75 TABLET ORAL at 04:24

## 2024-10-16 RX ADMIN — SENNOSIDES AND DOCUSATE SODIUM 1 TABLET: 8.6; 5 TABLET ORAL at 08:16

## 2024-10-16 RX ADMIN — PANTOPRAZOLE SODIUM 40 MG: 40 TABLET, DELAYED RELEASE ORAL at 08:16

## 2024-10-16 RX ADMIN — MELATONIN 6 MG: 3 TAB ORAL at 21:11

## 2024-10-16 RX ADMIN — HYDROMORPHONE HYDROCHLORIDE 0.2 MG: 0.2 INJECTION, SOLUTION INTRAMUSCULAR; INTRAVENOUS; SUBCUTANEOUS at 06:06

## 2024-10-16 RX ADMIN — ATORVASTATIN CALCIUM 40 MG: 40 TABLET, FILM COATED ORAL at 08:16

## 2024-10-16 RX ADMIN — INSULIN LISPRO 2 UNITS: 100 INJECTION, SOLUTION INTRAVENOUS; SUBCUTANEOUS at 08:15

## 2024-10-16 RX ADMIN — LATANOPROST 1 DROP: 50 SOLUTION OPHTHALMIC at 21:16

## 2024-10-16 RX ADMIN — POLYETHYLENE GLYCOL 3350, SODIUM SULFATE ANHYDROUS, SODIUM BICARBONATE, SODIUM CHLORIDE, POTASSIUM CHLORIDE 1000 ML: 236; 22.74; 6.74; 5.86; 2.97 POWDER, FOR SOLUTION ORAL at 15:30

## 2024-10-16 RX ADMIN — HEPARIN SODIUM 5000 UNITS: 5000 INJECTION INTRAVENOUS; SUBCUTANEOUS at 04:34

## 2024-10-16 RX ADMIN — OXYCODONE HYDROCHLORIDE 10 MG: 10 TABLET ORAL at 18:09

## 2024-10-16 RX ADMIN — HEPARIN SODIUM 5000 UNITS: 5000 INJECTION INTRAVENOUS; SUBCUTANEOUS at 15:24

## 2024-10-16 RX ADMIN — POLYETHYLENE GLYCOL 3350 17 G: 17 POWDER, FOR SOLUTION ORAL at 08:15

## 2024-10-16 RX ADMIN — INSULIN LISPRO 4 UNITS: 100 INJECTION, SOLUTION INTRAVENOUS; SUBCUTANEOUS at 11:58

## 2024-10-16 RX ADMIN — ACETAMINOPHEN 975 MG: 325 TABLET, FILM COATED ORAL at 15:25

## 2024-10-16 NOTE — ASSESSMENT & PLAN NOTE
Received Botox injections in the past  Urinary retention protocol, ahn catheter was placed by urology overnight for additional symptom relief. >700 cc's drained. No suprpubic tenderness at this time.

## 2024-10-16 NOTE — PLAN OF CARE
Problem: Potential for Falls  Goal: Patient will remain free of falls  Description: INTERVENTIONS:  - Educate patient/family on patient safety including physical limitations  - Instruct patient to call for assistance with activity   - Consult OT/PT to assist with strengthening/mobility   - Keep Call bell within reach  - Keep bed low and locked with side rails adjusted as appropriate  - Keep care items and personal belongings within reach  - Initiate and maintain comfort rounds  - Make Fall Risk Sign visible to staff  - Offer Toileting every 2 Hours, in advance of need  - Initiate/Maintain bed alarm  - Apply yellow socks and bracelet for high fall risk patients  - Consider moving patient to room near nurses station  Outcome: Progressing     Problem: PAIN - ADULT  Goal: Verbalizes/displays adequate comfort level or baseline comfort level  Description: Interventions:  - Encourage patient to monitor pain and request assistance  - Assess pain using appropriate pain scale  - Administer analgesics based on type and severity of pain and evaluate response  - Implement non-pharmacological measures as appropriate and evaluate response  - Consider cultural and social influences on pain and pain management  - Notify physician/advanced practitioner if interventions unsuccessful or patient reports new pain  Outcome: Progressing     Problem: INFECTION - ADULT  Goal: Absence or prevention of progression during hospitalization  Description: INTERVENTIONS:  - Assess and monitor for signs and symptoms of infection  - Monitor lab/diagnostic results  - Monitor all insertion sites, i.e. indwelling lines, tubes, and drains  - Monitor endotracheal if appropriate and nasal secretions for changes in amount and color  - Wendel appropriate cooling/warming therapies per order  - Administer medications as ordered  - Instruct and encourage patient and family to use good hand hygiene technique  - Identify and instruct in appropriate isolation  precautions for identified infection/condition  Outcome: Progressing  Goal: Absence of fever/infection during neutropenic period  Description: INTERVENTIONS:  - Monitor WBC    Outcome: Progressing     Problem: SAFETY ADULT  Goal: Patient will remain free of falls  Description: INTERVENTIONS:  - Educate patient/family on patient safety including physical limitations  - Instruct patient to call for assistance with activity   - Consult OT/PT to assist with strengthening/mobility   - Keep Call bell within reach  - Keep bed low and locked with side rails adjusted as appropriate  - Keep care items and personal belongings within reach  - Initiate and maintain comfort rounds  - Make Fall Risk Sign visible to staff  - Offer Toileting every 2 Hours, in advance of need  - Initiate/Maintain bed alarm  - Apply yellow socks and bracelet for high fall risk patients  - Consider moving patient to room near nurses station  Outcome: Progressing  Goal: Maintain or return to baseline ADL function  Description: INTERVENTIONS:  - Educate patient/family on patient safety including physical limitations  - Instruct patient to call for assistance with activity   - Consult OT/PT to assist with strengthening/mobility   - Keep Call bell within reach  - Keep bed low and locked with side rails adjusted as appropriate  - Keep care items and personal belongings within reach  - Initiate and maintain comfort rounds  - Make Fall Risk Sign visible to staff  - Offer Toileting every 2 Hours, in advance of need  - Initiate/Maintain bed alarm  - Apply yellow socks and bracelet for high fall risk patients  - Consider moving patient to room near nurses station  Outcome: Progressing  Goal: Maintains/Returns to pre admission functional level  Description: INTERVENTIONS:  - Perform AM-PAC 6 Click Basic Mobility/ Daily Activity assessment daily.  - Set and communicate daily mobility goal to care team and patient/family/caregiver.   - Collaborate with  rehabilitation services on mobility goals if consulted  - Perform Range of Motion  times a day.  - Reposition patient every  hours.  - Dangle patient  times a day  - Stand patient  times a day  - Ambulate patient  times a day  - Out of bed to chair  times a day   - Out of bed for meals times a day  - Out of bed for toileting  - Record patient progress and toleration of activity level   Outcome: Progressing     Problem: DISCHARGE PLANNING  Goal: Discharge to home or other facility with appropriate resources  Description: INTERVENTIONS:  - Identify barriers to discharge w/patient and caregiver  - Arrange for needed discharge resources and transportation as appropriate  - Identify discharge learning needs (meds, wound care, etc.)  - Arrange for interpretive services to assist at discharge as needed  - Refer to Case Management Department for coordinating discharge planning if the patient needs post-hospital services based on physician/advanced practitioner order or complex needs related to functional status, cognitive ability, or social support system  Outcome: Progressing     Problem: Knowledge Deficit  Goal: Patient/family/caregiver demonstrates understanding of disease process, treatment plan, medications, and discharge instructions  Description: Complete learning assessment and assess knowledge base.  Interventions:  - Provide teaching at level of understanding  - Provide teaching via preferred learning methods  Outcome: Progressing     Problem: Prexisting or High Potential for Compromised Skin Integrity  Goal: Skin integrity is maintained or improved  Description: INTERVENTIONS:  - Identify patients at risk for skin breakdown  - Assess and monitor skin integrity  - Assess and monitor nutrition and hydration status  - Monitor labs   - Assess for incontinence   - Turn and reposition patient  - Assist with mobility/ambulation  - Relieve pressure over bony prominences  - Avoid friction and shearing  - Provide  appropriate hygiene as needed including keeping skin clean and dry  - Evaluate need for skin moisturizer/barrier cream  - Collaborate with interdisciplinary team   - Patient/family teaching  - Consider wound care consult   Outcome: Progressing     Problem: Nutrition/Hydration-ADULT  Goal: Nutrient/Hydration intake appropriate for improving, restoring or maintaining nutritional needs  Description: Monitor and assess patient's nutrition/hydration status for malnutrition. Collaborate with interdisciplinary team and initiate plan and interventions as ordered.  Monitor patient's weight and dietary intake as ordered or per policy. Utilize nutrition screening tool and intervene as necessary. Determine patient's food preferences and provide high-protein, high-caloric foods as appropriate.     INTERVENTIONS:  - Monitor oral intake, urinary output, labs, and treatment plans  - Assess nutrition and hydration status and recommend course of action  - Evaluate amount of meals eaten  - Assist patient with eating if necessary   - Allow adequate time for meals  - Recommend/ encourage appropriate diets, oral nutritional supplements, and vitamin/mineral supplements  - Order, calculate, and assess calorie counts as needed  - Recommend, monitor, and adjust tube feedings and TPN/PPN based on assessed needs  - Assess need for intravenous fluids  - Provide specific nutrition/hydration education as appropriate  - Include patient/family/caregiver in decisions related to nutrition  Outcome: Progressing

## 2024-10-16 NOTE — CASE MANAGEMENT
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     Generalized edema 2024     Constipation 2024     Leg pain, right 2024     Acute on chronic diastolic congestive heart failure (HCC) 2024     s/p Medtronic loop recorder 3/2/2024 2024     Moderate protein-calorie malnutrition (HCC) 2024     Hypertensive urgency 2024     AMS (altered mental status) 2024     Encounter for examination for admission to nursing home 2024     Stage 3a chronic kidney disease ([facility]) 01/10/2024     Left ventricular outflow obstruction 2024     Obesity, Class I, BMI 30-34.9 2024     Urinary retention 2023     SADAF (acute kidney injury) ([facility]) 2023     Exertional shortness of breath 2023     Non obstructive CAD 11/15/2023     History of lumbar surgery 11/10/2023     Abnormal urinalysis 2023     Chronic obstructive pulmonary disease, unspecified COPD type ([facility]) 2023     Confusion with body shakes and blank stare 2023     Anemia in stage 3a chronic kidney disease  (HCC) 2023     Lower extremity edema 2023     Cerebrovascular accident (CVA), unspecified mechanism ([facility]) 2022     Stroke-like symptoms 2022     Prepyloric ulcer 2021     Diarrhea 2021     Mild intermittent asthma without complication 2020     S/P insertion of spinal cord stimulator 2018     Iron deficiency anemia secondary to inadequate dietary iron intake 2018     Type 2 diabetes mellitus with other  skin complications (HCC)      Failed back surgical syndrome 03/16/2018     Hypothyroidism 11/20/2017     Degenerative lumbar spinal stenosis 01/25/2017     Glaucoma 01/06/2017     Overactive bladder 08/04/2016     Dyspepsia 02/09/2016     Hypercholesterolemia 02/09/2016     Anxiety 02/09/2015     Chronic pain disorder 12/18/2013     Hypertension 06/12/2013       LOS (days): 5  Geometric Mean LOS (GMLOS) (days): 4.4  Days to GMLOS:-0.5     OBJECTIVE:  Risk of Unplanned Readmission Score: 35.31         Current admission status: Inpatient   Preferred Pharmacy:   [facility] [place], [place] - [address]  [address]  [address]  [facility] [address]  Phone: [phone] Fax: [phone]    [facility] [place] - [address]  [facility]  [address]  [facility]  [address] [place] [facility] [address]  Phone: [phone] Fax: [phone]    [facility] - [place], [place] - [address]  [address]  [place] [address]  Phone: [phone] Fax: [phone]    Primary Care Provider: NANY Pollock    Primary Insurance: [employer] [facility]  Secondary Insurance:     DISCHARGE DETAILS:     Additional Comments: CM called [facility] ([phone]) and left a voicemail with the wellness office requesting a return call to discuss whether they are able to accommodate pt's needs or if they are recommending rehab prior to return to their facility. CM informed that, per Dr. Carmona, pt will likely be medically stable for discharge tomorrow. CM department to follow.

## 2024-10-16 NOTE — ASSESSMENT & PLAN NOTE
Results from last 7 days   Lab Units 10/16/24  0751 10/15/24  0449 10/14/24  0457 10/13/24  0554   CREATININE mg/dL 1.54* 1.50* 1.33* 1.43*   Baseline 1.1-1.3.  Etiology suspected to be due to cardiorenal. Stable.

## 2024-10-16 NOTE — ASSESSMENT & PLAN NOTE
Lab Results   Component Value Date    HGBA1C 7.4 (H) 10/12/2024     Recent Labs     10/15/24  1608 10/15/24  2048 10/16/24  0702 10/16/24  1105   POCGLU 221* 224* 225* 341*   Continue sliding scale

## 2024-10-16 NOTE — PROGRESS NOTES
Progress Note - Hospitalist   Name: Mattie Varela 79 y.o. female I MRN: [id number]  Unit/Bed#: E5 -01 I Date of Admission: 10/11/2024   Date of Service: 10/16/2024 I Hospital Day: 5    Assessment & Plan  Acute on chronic diastolic congestive heart failure (HCC)  Wt Readings from Last 3 Encounters:   10/16/24 76.5 kg (168 lb 10.4 oz)   10/02/24 85.5 kg (188 lb 9.6 oz)   09/26/24 86.2 kg (190 lb)     79-year-old female was admitted due to volume overload, weight gain, and edema secondary to acute on chronic diastolic congestive heart failure.  Diuretic management per cardiology. May possibly be transitioned to po diuretics within the next 24 hours, awaiting next step in management.  Daily weights, I's and O's  SADAF (acute kidney injury) ([facility])  Results from last 7 days   Lab Units 10/16/24  0751 10/15/24  0449 10/14/24  0457 10/13/24  0554   CREATININE mg/dL 1.54* 1.50* 1.33* 1.43*   Baseline 1.1-1.3.  Etiology suspected to be due to cardiorenal. Stable.  Acute low back pain  Complaining of low back pain  No red flags.  No acute fractures and CT spine lumbar without contrast study  Continue supportive care  Degenerative lumbar spinal stenosis  Status post spinal stimulator, although daughter stated that this is no longer working.  Utilizes baclofen as needed and Percocet 10 mg 3 times daily as needed  Type 2 diabetes mellitus with other skin complications ([facility])  Lab Results   Component Value Date    HGBA1C 7.4 (H) 10/12/2024     Recent Labs     10/15/24  1608 10/15/24  2048 10/16/24  0702 10/16/24  1105   POCGLU 221* 224* 225* 341*   Continue sliding scale  Chronic pain disorder  Reviewed PDMP as well as facility paperwork  Prescribed Percocet 10-3 25 prn  Discontinue all IV medications, discussed with daughter  Bowel regimen for constipation, amenable to bowel prep  Chest pain  Appreciate cardiology recommendations.  No evidence of ACS at this time.  Hypertension  Continue metoprolol succinate 50 mg BID     Hypothyroidism  Continue Synthroid  Acute cystitis  Patient was diagnosed with acute cystitis as an outpatient was started on ciprofloxacin 7-day course through 10/15/2024  Continue renal adjusted dose through 10/15/2024  Overactive bladder  Received Botox injections in the past  Urinary retention protocol, ahn catheter was placed by urology overnight for additional symptom relief. >700 cc's drained. No suprpubic tenderness at this time.  Cerebrovascular accident (CVA), unspecified mechanism (HCC)  Continue aspirin, statin  Chronic obstructive pulmonary disease, unspecified COPD type (HCC)  Not in acute exacerbation  Continue albuterol as needed    VTE Pharmacologic Prophylaxis: VTE Score: 6 Moderate Risk (Score 3-4) - Pharmacological DVT Prophylaxis Ordered: heparin.    Mobility:   Basic Mobility Inpatient Raw Score: 14  -[facility] Goal: 4: Move to chair/commode  -[facility] Achieved: 4: Move to chair/commode    Discussions with Specialists or Other Care Team Provider: case management    Education and Discussions with Family / Patient: patient, daughter    Current Length of Stay: 5 day(s)  Current Patient Status: Inpatient   Certification Statement: The patient will continue to require additional inpatient hospital stay due to pain control, bowel regimen / constipation  Discharge Plan: 24 hours    Code Status: Level 1 - Full Code    Subjective   Patient seen and examined. Still with chronic pain, no new warning signs.     Objective   Vitals:   Temp (24hrs), Av.8 °F (36.6 °C), Min:97.4 °F (36.3 °C), Max:98 °F (36.7 °C)    Temp:  [97.4 °F (36.3 °C)-98 °F (36.7 °C)] 98 °F (36.7 °C)  HR:  [91-97] 97  Resp:  [15-16] 15  BP: (114-142)/(76-81) 131/80  SpO2:  [93 %-98 %] 93 %  Body mass index is 32.94 kg/m².     Input and Output Summary (last 24 hours):     Intake/Output Summary (Last 24 hours) at 10/16/2024 1349  Last data filed at 10/16/2024 1320  Gross per 24 hour   Intake 450 ml   Output 650 ml   Net -200 ml        Physical Exam  Vitals reviewed.   Constitutional:       General: She is not in acute distress.  HENT:      Head: Normocephalic.      Nose: Nose normal.      Mouth/Throat:      Mouth: Mucous membranes are moist.   Eyes:      General: No scleral icterus.  Cardiovascular:      Rate and Rhythm: Normal rate and regular rhythm.   Pulmonary:      Effort: Pulmonary effort is normal. No respiratory distress.      Breath sounds: No wheezing.   Abdominal:      General: There is no distension.      Palpations: Abdomen is soft.      Tenderness: There is no abdominal tenderness.   Skin:     General: Skin is warm.   Neurological:      Mental Status: She is alert.   Psychiatric:         Mood and Affect: Mood normal.         Behavior: Behavior normal.       Lines/Drains:  Lines/Drains/Airways       Active Status       Name Placement date Placement time Site Days    Urethral Catheter Latex 16 Fr. 10/14/24  1507  Latex  1                  Urinary Catheter:  Goal for removal: Voiding trial when ambulation improves                 Lab Results: I have reviewed the following results:   Results from last 7 days   Lab Units 10/15/24  0449 10/13/24  0554 10/12/24  0535   WBC Thousand/uL 8.42 8.28 8.56   HEMOGLOBIN g/dL 11.3* 11.9 10.8*   PLATELETS Thousands/uL 268 267 265   MCV fL 89 91 90     Results from last 7 days   Lab Units 10/16/24  0751 10/15/24  0449 10/14/24  0457 10/12/24  0535 10/11/24  1228   SODIUM mmol/L 132* 135 135   < > 136   POTASSIUM mmol/L 4.1 3.8 4.1   < > 5.0   CHLORIDE mmol/L 90* 91* 92*   < > 99   CO2 mmol/L 34* 36* 34*   < > 30   ANION GAP mmol/L 8 8 9   < > 7   BUN mg/dL 39* 30* 25   < > 29*   CREATININE mg/dL 1.54* 1.50* 1.33*   < > 1.65*   CALCIUM mg/dL 9.7 9.6 9.7   < > 9.4   ALBUMIN g/dL  --   --   --   --  3.8   TOTAL BILIRUBIN mg/dL  --   --   --   --  0.34   ALK PHOS U/L  --   --   --   --  123*   ALT U/L  --   --   --   --  7   AST U/L  --   --   --   --  17   EGFR ml/min/1.73sq m 31 32 37   < > 28    GLUCOSE RANDOM mg/dL 214* 178* 184*   < > 128    < > = values in this interval not displayed.     Results from last 7 days   Lab Units 10/16/24  0751 10/15/24  0449 10/12/24  0535   MAGNESIUM mg/dL 2.5 2.0 2.2   PHOSPHORUS mg/dL  --  4.9*  --          Results from last 7 days   Lab Units 10/11/24  1455 10/11/24  1228   HS TNI 0HR ng/L  --  10   HS TNI 2HR ng/L 8  --               Results from last 7 days   Lab Units 10/16/24  1105 10/16/24  0702 10/15/24  2048 10/15/24  1608 10/15/24  1057 10/15/24  0707 10/14/24  2054 10/14/24  1557 10/14/24  1141 10/14/24  0732 10/13/24  2057 10/13/24  1621   POC GLUCOSE mg/dl 341* 225* 224* 221* 242* 196* 275* 189* 195* 164* 171* 148*     Results from last 7 days   Lab Units 10/12/24  0535   HEMOGLOBIN A1C % 7.4*           Recent Cultures (last 7 days):         Imaging:  Reviewed radiology reports from this admission: no new imaging    Last 24 Hours Medication List:     Current Facility-Administered Medications:     acetaminophen (TYLENOL) tablet 975 mg, Q8H ARY    albuterol (PROVENTIL HFA,VENTOLIN HFA) inhaler 2 puff, Q6H PRN    aluminum-magnesium hydroxide-simethicone (MAALOX) oral suspension 30 mL, Q6H PRN    aspirin chewable tablet 81 mg, Daily    atorvastatin (LIPITOR) tablet 40 mg, Daily    baclofen tablet 10 mg, BID PRN    ferrous sulfate tablet 325 mg, Daily With Dinner    heparin (porcine) subcutaneous injection 5,000 Units, Q8H ARY    HYDROmorphone HCl (DILAUDID) injection 0.2 mg, Q6H PRN    insulin lispro (HumALOG/ADMELOG) 100 units/mL subcutaneous injection 1-5 Units, 4x Daily (AC & HS) **AND** Fingerstick Glucose (POCT), 4x Daily AC and at bedtime    latanoprost (XALATAN) 0.005 % ophthalmic solution 1 drop, HS    levothyroxine tablet 37.5 mcg, Early Morning    lidocaine (LIDODERM) 5 % patch 1 patch, Daily    magnesium Oxide (MAG-OX) tablet 400 mg, BID    meclizine (ANTIVERT) tablet 25 mg, Daily PRN    melatonin tablet 6 mg, HS    menthol-methyl salicylate (BENGAY)  10-15 % cream, 4x Daily PRN    metoprolol succinate (TOPROL-XL) 24 hr tablet 50 mg, Q12H ARY    morphine injection 2 mg, Q4H PRN    ondansetron (ZOFRAN) injection 4 mg, Q6H PRN    oxyCODONE (ROXICODONE) immediate release tablet 10 mg, TID PRN    pantoprazole (PROTONIX) EC tablet 40 mg, Daily Before Breakfast    polyethylene glycol (MIRALAX) packet 17 g, Daily    senna-docusate sodium (SENOKOT S) 8.6-50 mg per tablet 1 tablet, BID    zolpidem (AMBIEN) tablet 5 mg, HS PRN    **Please Note: This note may have been constructed using a voice recognition system.**

## 2024-10-16 NOTE — PROGRESS NOTES
Cardiology Progress Note   MD Natan Ruiz MD, FACC  Emerson Valdes DO, [facility]  MD Sindy Goldstein DO, [facility]  Torey Farnsworth DO, [facility]  ----------------------------------------------------------------  [address] [address]    Mattie Varela 79 y.o. female MRN: [id number]  Unit/Bed#: E5 -01 Encounter: [id number]      ASSESSMENT:   Acute on chronic HFpEF  LVEF 70%, grade 1 diastolic dysfunction, elevated LVOT velocities without obstruction with PG 25 mmHg with Valsalva, moderate MAC, mild TR, May 2024  Acute kidney injury  Hypertension  Dyslipidemia  Type 2 diabetes mellitus  Hypothyroid  History of CVA  DJD    PLAN:  Patient's weights have been substantially improving she is likely approaching euvolemia  Creatinine continuing to trend upward  Plan to reinitiate torsemide 20 mg twice daily tomorrow  Strict I's/O's and daily weights  Keep potassium greater than 4 magnesium greater than 2  Continue aspirin, high intensity statin and Toprol-XL  Plan to start torsemide 20 mg twice daily tomorrow  Monitor the patient 24 hours on oral torsemide and if weights are stable, reasonable for discharge from CV perspective    Signed: Emerson Valdes DO, [facility], LALO, LANCE, FACP      History of Present Illness:  Patient seen and examined.  Admits to feeling much improved overall.  Denies chest pain, pressure, tightness or squeezing.  Denies lightheadedness, dizziness or palpitations.  Denies lower extremity swelling, orthopnea or paroxysmal nocturnal dyspnea.      Review of Systems:  Review of Systems   Constitutional: Negative for decreased appetite, fever, weight gain and weight loss.   HENT:  Negative for congestion and sore throat.    Eyes:  Negative for visual disturbance.   Cardiovascular:  Negative for chest pain, dyspnea on exertion, leg swelling, near-syncope and palpitations.   Respiratory:  Negative for cough and shortness of breath.     Hematologic/Lymphatic: Negative for bleeding problem.   Skin:  Negative for rash.   Musculoskeletal:  Negative for myalgias and neck pain.   Gastrointestinal:  Negative for abdominal pain and nausea.   Neurological:  Negative for light-headedness and weakness.   Psychiatric/Behavioral:  Negative for depression.        Allergies   Allergen Reactions    Penicillins Anaphylaxis, Hives and Other (See Comments)     Other reaction(s): Unknown Reaction    Sulfa Antibiotics Anaphylaxis and Other (See Comments)     Other reaction(s): Unknown Reaction    Aspartame - Food Allergy Other (See Comments) and Hypertension     Slurred speech, weakness, stroke sx    Iodinated Contrast Media Hives     Pt has taken prep prior for contrast and has not had any break through reaction    Keflex [Cephalexin] Hives       No current facility-administered medications on file prior to encounter.     Current Outpatient Medications on File Prior to Encounter   Medication Sig    albuterol (PROVENTIL HFA,VENTOLIN HFA) 90 mcg/act inhaler Inhale 2 puffs every 6 (six) hours as needed for wheezing or shortness of breath    aspirin 81 mg chewable tablet Chew 81 mg daily    atorvastatin (LIPITOR) 40 mg tablet TAKE ONE TABLET BY MOUTH ONCE DAILY (Patient taking differently: Take 40 mg by mouth daily)    baclofen 10 mg tablet Take 10 mg by mouth 2 (two) times a day as needed for muscle spasms    docusate sodium (COLACE) 100 mg capsule Take 1 capsule (100 mg total) by mouth 2 (two) times a day (Patient taking differently: Take 100 mg by mouth 2 (two) times a day as needed)    ferrous sulfate 325 (65 Fe) mg tablet Take 1 tablet (325 mg total) by mouth daily with breakfast    latanoprost (XALATAN) 0.005 % ophthalmic solution Administer 1 drop to both eyes daily at bedtime    levothyroxine 75 mcg tablet Take 0.5 tablets (37.5 mcg total) by mouth daily in the early morning    MAGNESIUM OXIDE 400 PO Take 400 mg by mouth 2 (two) times a day    meclizine  (ANTIVERT) 25 mg tablet Take 1 tablet (25 mg total) by mouth 4 (four) times a day as needed for dizziness (Patient taking differently: Take 25 mg by mouth daily as needed for dizziness)    metFORMIN (GLUCOPHAGE) 1000 MG tablet Take 1 tablet (1,000 mg total) by mouth 2 (two) times a day with meals (Patient taking differently: Take 500 mg by mouth 2 (two) times a day with meals)    metoprolol succinate (TOPROL-XL) 50 mg 24 hr tablet Take 1 tablet (50 mg total) by mouth every 12 (twelve) hours    Milnacipran HCl (Savella) 100 MG TABS Take 1 tablet (100 mg total) by mouth daily    oxyCODONE-acetaminophen (PERCOCET)  mg per tablet Take 1 tablet by mouth every 6 (six) hours as needed    pantoprazole (PROTONIX) 40 mg tablet Take 1 tablet (40 mg total) by mouth daily    polyethylene glycol (MIRALAX) 17 g packet Take 17 g by mouth daily    senna (SENOKOT) 8.6 mg Take 2 tablets (17.2 mg total) by mouth daily at bedtime    torsemide (DEMADEX) 20 mg tablet Take 20 mg by mouth daily    Blood Glucose Monitoring Suppl (OneTouch Verio Reflect) w/Device KIT Check blood sugars twice daily. Please substitute with appropriate alternative as covered by patient's insurance. Dx: E11.65    glucose blood (OneTouch Verio) test strip Check blood sugars twice daily. Please substitute with appropriate alternative as covered by patient's insurance. Dx: E11.65    OneTouch Delica Lancets 33G MISC Check blood sugars twice daily. Please substitute with appropriate alternative as covered by patient's insurance. Dx: E11.65    [DISCONTINUED] sucralfate (CARAFATE) 1 g tablet Take 1 tablet (1 g total) by mouth 4 (four) times a day        Current Facility-Administered Medications   Medication Dose Route Frequency Provider Last Rate    acetaminophen  975 mg Oral Q8H ARY Paco Carmona MD      albuterol  2 puff Inhalation Q6H PRN Alexys Angel,       aluminum-magnesium hydroxide-simethicone  30 mL Oral Q6H PRN Alexys Angel DO      aspirin   81 mg Oral Daily Alexys Angel DO      atorvastatin  40 mg Oral Daily Alexys Angel DO      baclofen  10 mg Oral BID PRN Alexys Angel DO      ciprofloxacin  500 mg Oral Q24H Alexys Angel DO      ferrous sulfate  325 mg Oral Daily With Dinner Alexys Angel DO      heparin (porcine)  5,000 Units Subcutaneous Q8H [facility] Alexys Angel DO      HYDROmorphone  0.2 mg Intravenous Q6H PRN Paco Carmona MD      insulin lispro  1-5 Units Subcutaneous 4x Daily (AC & HS) Alexys Angel DO      latanoprost  1 drop Both Eyes HS Alexys Angel DO      levothyroxine  37.5 mcg Oral Early Morning Alexys Angel DO      lidocaine  1 patch Topical Daily Susannah Mcclain PA-C      magnesium Oxide  400 mg Oral BID Alexys Angel DO      meclizine  25 mg Oral Daily PRN Alexys Angel, DO      melatonin  6 mg Oral HS Royer Carballo, DO      menthol-methyl salicylate   Apply externally 4x Daily PRN Bi Luke PA-C      metoprolol succinate  50 mg Oral Q12H [facility] Alexys Angel DO      morphine injection  2 mg Intravenous Q4H PRN Susannah Mcclain PA-C      ondansetron  4 mg Intravenous Q6H PRN Alexys Angel DO      oxyCODONE  10 mg Oral TID PRN Susannah Mcclain PA-C      pantoprazole  40 mg Oral Daily Before Breakfast Alexys Angel DO      polyethylene glycol  17 g Oral Daily Alexys Angel DO      senna-docusate sodium  1 tablet Oral BID Susannah Mcclain PA-C      zolpidem  5 mg Oral HS PRN Susannah Mcclain PA-C              Vitals:    10/15/24 1519 10/15/24 2315 10/16/24 0600 10/16/24 0701   BP: 114/76 142/81  131/80   BP Location: Left arm   Left arm   Pulse: 91 94  97   Resp: 16 16  15   Temp: (!) 97.4 °F (36.3 °C) 97.9 °F (36.6 °C)  98 °F (36.7 °C)   TempSrc: Oral   Oral   SpO2: 95% 98%  93%   Weight:   76.5 kg (168 lb 10.4 oz)    Height:         Body mass index is 32.94 kg/m².      Intake/Output Summary (Last 24 hours) at 10/16/2024 1045  Last data filed at 10/16/2024  "0910  Gross per 24 hour   Intake 450 ml   Output 650 ml   Net -200 ml       Weight change: 0.5 kg (1 lb 1.6 oz)    PHYSICAL EXAMINATION:  Gen: Awake, Alert, NAD  Head/eyes: AT/NC, pupils equal and round, Anicteric  ENT: mmm  Neck: Supple, No elevated JVP, trachea midline  Resp: CTA bilaterally no w/r/r  CV: RRR +S1, S2, No m/r/g  Abd: Soft, obese, NT/ND + BS  Ext: no LE edema bilaterally  Neuro: Follows commands, moves all extermities  Psych: Appropriate affect, normal mood, pleasant attitude, non-combative  Skin: warm; no rash, erythema or venous stasis changes on exposed skin    Lab Results:  Results from last 7 days   Lab Units 10/15/24  0449   WBC Thousand/uL 8.42   HEMOGLOBIN g/dL 11.3*   HEMATOCRIT % 35.7   PLATELETS Thousands/uL 268     Results from last 7 days   Lab Units 10/16/24  0751 10/12/24  0535 10/11/24  1228   POTASSIUM mmol/L 4.1   < > 5.0   CHLORIDE mmol/L 90*   < > 99   CO2 mmol/L 34*   < > 30   BUN mg/dL 39*   < > 29*   CREATININE mg/dL 1.54*   < > 1.65*   CALCIUM mg/dL 9.7   < > 9.4   ALK PHOS U/L  --   --  123*   ALT U/L  --   --  7   AST U/L  --   --  17    < > = values in this interval not displayed.     No results found for: \"TROPONINT\"      Results from last 7 days   Lab Units 10/11/24  1455 10/11/24  1228   HS TNI 0HR ng/L  --  10   HS TNI 2HR ng/L 8  --              This note was completed in part utilizing M-Modal Fluency Direct Software.  Grammatical errors, random word insertions, spelling mistakes, and incomplete sentences may be an occasional consequence of this system secondary to software limitations, ambient noise, and hardware issues.  If you have any questions or concerns about the content, text, or information contained within the body of this dictation, please contact the provider for clarification.      "

## 2024-10-16 NOTE — ASSESSMENT & PLAN NOTE
Reviewed PDMP as well as facility paperwork  Prescribed Percocet 10-3 25 prn  Discontinue all IV medications, discussed with daughter  Bowel regimen for constipation, amenable to bowel prep

## 2024-10-16 NOTE — ASSESSMENT & PLAN NOTE
Wt Readings from Last 3 Encounters:   10/16/24 76.5 kg (168 lb 10.4 oz)   10/02/24 85.5 kg (188 lb 9.6 oz)   09/26/24 86.2 kg (190 lb)     79-year-old female was admitted due to volume overload, weight gain, and edema secondary to acute on chronic diastolic congestive heart failure.  Diuretic management per cardiology. May possibly be transitioned to po diuretics within the next 24 hours, awaiting next step in management.  Daily weights, I's and O's

## 2024-10-17 LAB
ANION GAP SERPL CALCULATED.3IONS-SCNC: 11 MMOL/L (ref 4–13)
BUN SERPL-MCNC: 39 MG/DL (ref 5–25)
CALCIUM SERPL-MCNC: 9.5 MG/DL (ref 8.4–10.2)
CHLORIDE SERPL-SCNC: 88 MMOL/L (ref 96–108)
CO2 SERPL-SCNC: 34 MMOL/L (ref 21–32)
CREAT SERPL-MCNC: 1.42 MG/DL (ref 0.6–1.3)
ERYTHROCYTE [DISTWIDTH] IN BLOOD BY AUTOMATED COUNT: 13.3 % (ref 11.6–15.1)
GFR SERPL CREATININE-BSD FRML MDRD: 34 ML/MIN/1.73SQ M
GLUCOSE SERPL-MCNC: 193 MG/DL (ref 65–140)
GLUCOSE SERPL-MCNC: 205 MG/DL (ref 65–140)
GLUCOSE SERPL-MCNC: 209 MG/DL (ref 65–140)
GLUCOSE SERPL-MCNC: 225 MG/DL (ref 65–140)
GLUCOSE SERPL-MCNC: 231 MG/DL (ref 65–140)
HCT VFR BLD AUTO: 37.8 % (ref 34.8–46.1)
HGB BLD-MCNC: 12.2 G/DL (ref 11.5–15.4)
MAGNESIUM SERPL-MCNC: 2.6 MG/DL (ref 1.9–2.7)
MCH RBC QN AUTO: 29 PG (ref 26.8–34.3)
MCHC RBC AUTO-ENTMCNC: 32.3 G/DL (ref 31.4–37.4)
MCV RBC AUTO: 90 FL (ref 82–98)
PLATELET # BLD AUTO: 352 THOUSANDS/UL (ref 149–390)
PMV BLD AUTO: 10.6 FL (ref 8.9–12.7)
POTASSIUM SERPL-SCNC: 4.1 MMOL/L (ref 3.5–5.3)
RBC # BLD AUTO: 4.2 MILLION/UL (ref 3.81–5.12)
SODIUM SERPL-SCNC: 133 MMOL/L (ref 135–147)
WBC # BLD AUTO: 9.04 THOUSAND/UL (ref 4.31–10.16)

## 2024-10-17 PROCEDURE — 82948 REAGENT STRIP/BLOOD GLUCOSE: CPT

## 2024-10-17 PROCEDURE — 97530 THERAPEUTIC ACTIVITIES: CPT

## 2024-10-17 PROCEDURE — 80048 BASIC METABOLIC PNL TOTAL CA: CPT | Performed by: STUDENT IN AN ORGANIZED HEALTH CARE EDUCATION/TRAINING PROGRAM

## 2024-10-17 PROCEDURE — 97116 GAIT TRAINING THERAPY: CPT

## 2024-10-17 PROCEDURE — 85027 COMPLETE CBC AUTOMATED: CPT | Performed by: STUDENT IN AN ORGANIZED HEALTH CARE EDUCATION/TRAINING PROGRAM

## 2024-10-17 PROCEDURE — 83735 ASSAY OF MAGNESIUM: CPT | Performed by: STUDENT IN AN ORGANIZED HEALTH CARE EDUCATION/TRAINING PROGRAM

## 2024-10-17 PROCEDURE — 99232 SBSQ HOSP IP/OBS MODERATE 35: CPT | Performed by: STUDENT IN AN ORGANIZED HEALTH CARE EDUCATION/TRAINING PROGRAM

## 2024-10-17 PROCEDURE — 97110 THERAPEUTIC EXERCISES: CPT

## 2024-10-17 PROCEDURE — 99232 SBSQ HOSP IP/OBS MODERATE 35: CPT | Performed by: INTERNAL MEDICINE

## 2024-10-17 RX ORDER — TORSEMIDE 20 MG/1
20 TABLET ORAL
Status: DISCONTINUED | OUTPATIENT
Start: 2024-10-17 | End: 2024-10-18

## 2024-10-17 RX ORDER — TORSEMIDE 20 MG/1
40 TABLET ORAL
Status: DISCONTINUED | OUTPATIENT
Start: 2024-10-17 | End: 2024-10-17

## 2024-10-17 RX ORDER — HYDROCORTISONE 25 MG/G
CREAM TOPICAL 4 TIMES DAILY PRN
Status: DISCONTINUED | OUTPATIENT
Start: 2024-10-17 | End: 2024-10-19 | Stop reason: HOSPADM

## 2024-10-17 RX ADMIN — TORSEMIDE 20 MG: 20 TABLET ORAL at 08:12

## 2024-10-17 RX ADMIN — INSULIN LISPRO 1 UNITS: 100 INJECTION, SOLUTION INTRAVENOUS; SUBCUTANEOUS at 08:12

## 2024-10-17 RX ADMIN — ACETAMINOPHEN 975 MG: 325 TABLET, FILM COATED ORAL at 05:17

## 2024-10-17 RX ADMIN — INSULIN LISPRO 1 UNITS: 100 INJECTION, SOLUTION INTRAVENOUS; SUBCUTANEOUS at 12:16

## 2024-10-17 RX ADMIN — ACETAMINOPHEN 975 MG: 325 TABLET, FILM COATED ORAL at 21:15

## 2024-10-17 RX ADMIN — FERROUS SULFATE TAB 325 MG (65 MG ELEMENTAL FE) 325 MG: 325 (65 FE) TAB at 17:36

## 2024-10-17 RX ADMIN — Medication 400 MG: at 21:15

## 2024-10-17 RX ADMIN — TORSEMIDE 20 MG: 20 TABLET ORAL at 17:36

## 2024-10-17 RX ADMIN — METOPROLOL SUCCINATE 50 MG: 50 TABLET, EXTENDED RELEASE ORAL at 08:12

## 2024-10-17 RX ADMIN — METOPROLOL SUCCINATE 50 MG: 50 TABLET, EXTENDED RELEASE ORAL at 21:16

## 2024-10-17 RX ADMIN — OXYCODONE HYDROCHLORIDE 10 MG: 10 TABLET ORAL at 05:17

## 2024-10-17 RX ADMIN — PANTOPRAZOLE SODIUM 40 MG: 40 TABLET, DELAYED RELEASE ORAL at 05:23

## 2024-10-17 RX ADMIN — LATANOPROST 1 DROP: 50 SOLUTION OPHTHALMIC at 21:15

## 2024-10-17 RX ADMIN — ASPIRIN 81 MG CHEWABLE TABLET 81 MG: 81 TABLET CHEWABLE at 08:12

## 2024-10-17 RX ADMIN — OXYCODONE HYDROCHLORIDE 10 MG: 10 TABLET ORAL at 01:40

## 2024-10-17 RX ADMIN — Medication 400 MG: at 08:12

## 2024-10-17 RX ADMIN — INSULIN LISPRO 2 UNITS: 100 INJECTION, SOLUTION INTRAVENOUS; SUBCUTANEOUS at 21:15

## 2024-10-17 RX ADMIN — CYCLOBENZAPRINE 10 MG: 10 TABLET, FILM COATED ORAL at 00:39

## 2024-10-17 RX ADMIN — DIPHENHYDRAMINE HYDROCHLORIDE 25 MG: 25 TABLET ORAL at 21:19

## 2024-10-17 RX ADMIN — MENTHOL 10%, METHYL SALICYLATE 15%: 10; 15 CREAM TOPICAL at 17:39

## 2024-10-17 RX ADMIN — ATORVASTATIN CALCIUM 40 MG: 40 TABLET, FILM COATED ORAL at 08:12

## 2024-10-17 RX ADMIN — HEPARIN SODIUM 5000 UNITS: 5000 INJECTION INTRAVENOUS; SUBCUTANEOUS at 05:23

## 2024-10-17 RX ADMIN — HEPARIN SODIUM 5000 UNITS: 5000 INJECTION INTRAVENOUS; SUBCUTANEOUS at 15:34

## 2024-10-17 RX ADMIN — HEPARIN SODIUM 5000 UNITS: 5000 INJECTION INTRAVENOUS; SUBCUTANEOUS at 21:15

## 2024-10-17 RX ADMIN — MELATONIN 6 MG: 3 TAB ORAL at 21:15

## 2024-10-17 RX ADMIN — ACETAMINOPHEN 975 MG: 325 TABLET, FILM COATED ORAL at 15:33

## 2024-10-17 RX ADMIN — BACLOFEN 10 MG: 10 TABLET ORAL at 11:05

## 2024-10-17 RX ADMIN — OXYCODONE HYDROCHLORIDE 10 MG: 10 TABLET ORAL at 19:33

## 2024-10-17 RX ADMIN — LEVOTHYROXINE SODIUM 37.5 MCG: 75 TABLET ORAL at 05:17

## 2024-10-17 RX ADMIN — INSULIN LISPRO 2 UNITS: 100 INJECTION, SOLUTION INTRAVENOUS; SUBCUTANEOUS at 17:36

## 2024-10-17 RX ADMIN — OXYCODONE HYDROCHLORIDE 10 MG: 10 TABLET ORAL at 12:16

## 2024-10-17 NOTE — PHYSICAL THERAPY NOTE
PHYSICAL THERAPY NOTE          Patient Name: Mattie Varela  Today's Date: 10/17/2024     10/17/24 1040   Note Type   Note Type Treatment   Pain Assessment   Pain Assessment Tool 0-10   Pain Score 9   Pain Location/Orientation Location: Back  (B/L FLANK)   Pain Onset/Description   (MUSCLE SPASMS)   Hospital Pain Intervention(s) Repositioned;Ambulation/increased activity;Emotional support;Rest   Restrictions/Precautions   Other Precautions Fall Risk;Fluid restriction;Bed Alarm;Chair Alarm;Spinal precautions   General   Chart Reviewed Yes   Family/Caregiver Present No   Cognition   Overall Cognitive Status WFL   Arousal/Participation Alert   Attention Attends with cues to redirect   Orientation Level Oriented X4   Memory Decreased recall of precautions   Following Commands Follows one step commands with increased time or repetition   Subjective   Subjective I HAVE MUSCLE SPASMS TODAY IN MY BACK AND MY SIDES,   Bed Mobility   Additional Comments PT  OUTOF BED IN CHAIR UPON ARRIVAL.  pt remained out of bed in chair post PT session.   Transfers   Sit to Stand 4  Minimal assistance   Additional items Assist x 1;Armrests;Increased time required;Verbal cues  (progressing to supervision)   Stand to Sit 4  Minimal assistance   Additional items Assist x 1;Armrests;Increased time required;Verbal cues  (progressing to supervision)   Stand pivot 4  Minimal assistance   Additional items Assist x 1;Increased time required;Verbal cues   Toilet transfer 4  Minimal assistance   Additional items Assist x 1;Armrests;Increased time required;Verbal cues;Commode   Additional Comments verbal ceus for hand placement, body positioning and alignment prior to sitting. sit to stand transfers performed 5x with improved ability to perform  progressing from min assist x1 to supervision x1.   Ambulation/Elevation   Gait pattern Forward Flexion;Excessively slow;Short  stride;Step through pattern   Gait Assistance 4  Minimal assist   Additional items Assist x 1;Verbal cues   Assistive Device Rolling walker   Distance 25' x1,  5'x1 with rollator walker limited by bowel incontinence   Balance   Static Sitting Fair +   Static Standing Fair -  (with support of  wheeled walker or rollator)   Dynamic Standing Poor +   Ambulatory Poor +   Endurance Deficit   Endurance Deficit Description fatigue,  pain and bowel incontinence   Activity Tolerance   Activity Tolerance Patient limited by pain;Patient limited by fatigue   Medical Staff Made Aware kylie, restorative therapist   Nurse Made Aware yes   Exercises   Quad Sets Sitting;AROM;Bilateral  (x 12 reps)   Hip Abduction Sitting;AROM;Bilateral  (x 12 reps.)   Hip Adduction Sitting;AROM;Bilateral  (x 12 reps.  isometric hip add  x 12 reps.)   Knee AROM Short Arc Quad Sitting;AROM;Bilateral;10 reps  (longsitting)   Knee AROM Long Arc Quad Sitting;10 reps;AROM;Bilateral   Ankle Pumps Sitting;15 reps;AROM;Bilateral   Equipment Use   Comments PT  incontinent of bowel x2 during PT session requiring total assist for personal hygiene, gown and sock doffing and donning.   Assessment   Prognosis Fair   Problem List Decreased strength;Decreased endurance;Impaired balance;Decreased mobility;Obesity;Orthopedic restrictions;Pain   Assessment Pt seen for PT treatment session this date with interventions consisting of transfer training, gait training, and HEP, and education provided as needed for safety and direction to improve functional mobility, safety awareness, and activity tolerance. Pt agreeable to PT treatment session upon arrival, pt found seated out of bed in recliner. At end of session, pt left seated out of bed in recliner with all needs in reach. In comparison to previous session, pt with improvement in activity tolerance, endurance, standing balance, ambulation distances, ambulatory balance, and functional mobility. Pt is showing progress with  improvements as noted.  Pt  is requiring less assistance for transfers and ambulation on levels with use of Rw.pt  progressed from min assist x1 for sit to stand and stand to sit tranfers w supervision x1 with verbal cues for hand placement and body positioning/ alignment with chair or commode prior to sitting. Improved activity tolerance with progressing of ambulation distances to 25' with use of rw.  Pt  is limited by fatigue and low back pain. No gross LOB noted.  Gait deviations as noted in flow sheet.  Increased time is required to perform and complete all mobility.  Two episodes of incontinence this session requiring total assist for elvira anal hygiene, donning and doffing of b/l slipper socks and gown.  Pt  performs seated and long sitting b/l le arom and isometric exercises  x 10-15 reps to tolerance.  Increased time required to perform and complete all mobility and there ex.  Continue to recommend  level II moderate rehab resource intensity (unless [facility] is able to care for pt at current level of mobilty then home with HHPT)  at time of d/c in order to maximize pt's functional independence and safety w/ mobility. Pt continues to be functioning below baseline level. PT will continue to see pt while here in order to address the deficits listed above and provide interventions consistent w/ POC in effort to achieve STGs.    The patient's AM-PAC Basic Mobility Inpatient Short Form Raw Score is 14. A raw score less than 16 suggests the patient may benefit from discharge to post-acute rehabilitation services. Please also refer to the recommendation of the Physical Therapist for safe discharge planning.   Goals   Patient Goals To walk more.   STG Expiration Date 10/27/24   PT Treatment Day 3   Plan   Treatment/Interventions Functional transfer training;LE strengthening/ROM;Therapeutic exercise;Endurance training;Patient/family training;Equipment eval/education;Gait training;Spoke to nursing  (restorative therapist)    Progress Progressing toward goals   PT Frequency 3-5x/wk   Discharge Recommendation   Rehab Resource Intensity Level, PT II (Moderate Resource Intensity)   Additional Comments unless halfway is able to care for pt at current level of mobilty then home with HHPT.   AM-PAC Basic Mobility Inpatient   Turning in Flat Bed Without Bedrails 2   Lying on Back to Sitting on Edge of Flat Bed Without Bedrails 2   Moving Bed to Chair 3   Standing Up From Chair Using Arms 3   Walk in Room 3   Climb 3-5 Stairs With Railing 1   Basic Mobility Inpatient Raw Score 14   Basic Mobility Standardized Score 35.55   [facility] Highest Level Of Mobility   -[facility] Goal 4: Move to chair/commode   -[facility] Achieved 7: Walk 25 feet or more   Education   Education Provided Mobility training;Home exercise program;Assistive device   Patient Demonstrates verbal understanding   End of Consult   Patient Position at End of Consult Bedside chair;Bed/Chair alarm activated;All needs within reach   Bettye Bullock, PTA    stretcher

## 2024-10-17 NOTE — ASSESSMENT & PLAN NOTE
Received Botox injections in the past  Urinary retention protocol, ahn catheter was placed by urology >700 cc's drained.  Urology recommends outpatient ahn catheter voiding trial

## 2024-10-17 NOTE — PLAN OF CARE
Problem: Potential for Falls  Goal: Patient will remain free of falls  Description: INTERVENTIONS:  - Educate patient/family on patient safety including physical limitations  - Instruct patient to call for assistance with activity   - Consult OT/PT to assist with strengthening/mobility   - Keep Call bell within reach  - Keep bed low and locked with side rails adjusted as appropriate  - Keep care items and personal belongings within reach  - Initiate and maintain comfort rounds  - Make Fall Risk Sign visible to staff  - Offer Toileting every 2 Hours, in advance of need  - Initiate/Maintain bed alarm  - Apply yellow socks and bracelet for high fall risk patients  - Consider moving patient to room near nurses station  Outcome: Progressing     Problem: PAIN - ADULT  Goal: Verbalizes/displays adequate comfort level or baseline comfort level  Description: Interventions:  - Encourage patient to monitor pain and request assistance  - Assess pain using appropriate pain scale  - Administer analgesics based on type and severity of pain and evaluate response  - Implement non-pharmacological measures as appropriate and evaluate response  - Consider cultural and social influences on pain and pain management  - Notify physician/advanced practitioner if interventions unsuccessful or patient reports new pain  Outcome: Progressing     Problem: INFECTION - ADULT  Goal: Absence or prevention of progression during hospitalization  Description: INTERVENTIONS:  - Assess and monitor for signs and symptoms of infection  - Monitor lab/diagnostic results  - Monitor all insertion sites, i.e. indwelling lines, tubes, and drains  - Monitor endotracheal if appropriate and nasal secretions for changes in amount and color  - [place] appropriate cooling/warming therapies per order  - Administer medications as ordered  - Instruct and encourage patient and family to use good hand hygiene technique  - Identify and instruct in appropriate isolation  precautions for identified infection/condition  Outcome: Progressing  Goal: Absence of fever/infection during neutropenic period  Description: INTERVENTIONS:  - Monitor WBC    Outcome: Progressing     Problem: SAFETY ADULT  Goal: Patient will remain free of falls  Description: INTERVENTIONS:  - Educate patient/family on patient safety including physical limitations  - Instruct patient to call for assistance with activity   - Consult OT/PT to assist with strengthening/mobility   - Keep Call bell within reach  - Keep bed low and locked with side rails adjusted as appropriate  - Keep care items and personal belongings within reach  - Initiate and maintain comfort rounds  - Make Fall Risk Sign visible to staff  - Offer Toileting every 2 Hours, in advance of need  - Initiate/Maintain bed alarm  - Apply yellow socks and bracelet for high fall risk patients  - Consider moving patient to room near nurses station  Outcome: Progressing  Goal: Maintain or return to baseline ADL function  Description: INTERVENTIONS:  - Educate patient/family on patient safety including physical limitations  - Instruct patient to call for assistance with activity   - Consult OT/PT to assist with strengthening/mobility   - Keep Call bell within reach  - Keep bed low and locked with side rails adjusted as appropriate  - Keep care items and personal belongings within reach  - Initiate and maintain comfort rounds  - Make Fall Risk Sign visible to staff  - Offer Toileting every 2 Hours, in advance of need  - Initiate/Maintain bed alarm  - Apply yellow socks and bracelet for high fall risk patients  - Consider moving patient to room near nurses station  Outcome: Progressing  Goal: Maintains/Returns to pre admission functional level  Description: INTERVENTIONS:  - Perform AM-PAC 6 Click Basic Mobility/ Daily Activity assessment daily.  - Set and communicate daily mobility goal to care team and patient/family/caregiver.   - Collaborate with  rehabilitation services on mobility goals if consulted  - Perform Range of Motion  times a day.  - Reposition patient every  hours.  - Dangle patient  times a day  - Stand patient  times a day  - Ambulate patient  times a day  - Out of bed to chair  times a day   - Out of bed for meals times a day  - Out of bed for toileting  - Record patient progress and toleration of activity level   Outcome: Progressing     Problem: DISCHARGE PLANNING  Goal: Discharge to home or other facility with appropriate resources  Description: INTERVENTIONS:  - Identify barriers to discharge w/patient and caregiver  - Arrange for needed discharge resources and transportation as appropriate  - Identify discharge learning needs (meds, wound care, etc.)  - Arrange for interpretive services to assist at discharge as needed  - Refer to Case Management Department for coordinating discharge planning if the patient needs post-hospital services based on physician/advanced practitioner order or complex needs related to functional status, cognitive ability, or social support system  Outcome: Progressing     Problem: Knowledge Deficit  Goal: Patient/family/caregiver demonstrates understanding of disease process, treatment plan, medications, and discharge instructions  Description: Complete learning assessment and assess knowledge base.  Interventions:  - Provide teaching at level of understanding  - Provide teaching via preferred learning methods  Outcome: Progressing     Problem: Prexisting or High Potential for Compromised Skin Integrity  Goal: Skin integrity is maintained or improved  Description: INTERVENTIONS:  - Identify patients at risk for skin breakdown  - Assess and monitor skin integrity  - Assess and monitor nutrition and hydration status  - Monitor labs   - Assess for incontinence   - Turn and reposition patient  - Assist with mobility/ambulation  - Relieve pressure over bony prominences  - Avoid friction and shearing  - Provide  appropriate hygiene as needed including keeping skin clean and dry  - Evaluate need for skin moisturizer/barrier cream  - Collaborate with interdisciplinary team   - Patient/family teaching  - Consider wound care consult   Outcome: Progressing     Problem: Nutrition/Hydration-ADULT  Goal: Nutrient/Hydration intake appropriate for improving, restoring or maintaining nutritional needs  Description: Monitor and assess patient's nutrition/hydration status for malnutrition. Collaborate with interdisciplinary team and initiate plan and interventions as ordered.  Monitor patient's weight and dietary intake as ordered or per policy. Utilize nutrition screening tool and intervene as necessary. Determine patient's food preferences and provide high-protein, high-caloric foods as appropriate.     INTERVENTIONS:  - Monitor oral intake, urinary output, labs, and treatment plans  - Assess nutrition and hydration status and recommend course of action  - Evaluate amount of meals eaten  - Assist patient with eating if necessary   - Allow adequate time for meals  - Recommend/ encourage appropriate diets, oral nutritional supplements, and vitamin/mineral supplements  - Order, calculate, and assess calorie counts as needed  - Recommend, monitor, and adjust tube feedings and TPN/PPN based on assessed needs  - Assess need for intravenous fluids  - Provide specific nutrition/hydration education as appropriate  - Include patient/family/caregiver in decisions related to nutrition  Outcome: Progressing

## 2024-10-17 NOTE — PROGRESS NOTES
Progress Note - Hospitalist   Name: Mattie Varela 79 y.o. female I MRN: [id number]  Unit/Bed#: E5 -01 I Date of Admission: 10/11/2024   Date of Service: 10/17/2024 I Hospital Day: 6    Assessment & Plan  Acute on chronic diastolic congestive heart failure (HCC)  Wt Readings from Last 3 Encounters:   10/17/24 77.4 kg (170 lb 10.2 oz)   10/02/24 85.5 kg (188 lb 9.6 oz)   09/26/24 86.2 kg (190 lb)     79-year-old female was admitted due to volume overload, weight gain, and edema secondary to acute on chronic diastolic congestive heart failure.  Diuretic management per cardiology. Currently on po diuretics, anticipate discharge clearance tomorrow from a CV standpoint  Daily weights, I's and O's  Awaiting discharge to rehab tomorrow  SADAF (acute kidney injury) ([facility])  Results from last 7 days   Lab Units 10/17/24  0637 10/16/24  0751 10/15/24  0449 10/14/24  0457   CREATININE mg/dL 1.42* 1.54* 1.50* 1.33*   Baseline 1.1-1.3.  Etiology suspected to be due to cardiorenal. Stable.  Acute low back pain  Complaining of low back pain  No red flags.  No acute fractures and CT spine lumbar without contrast study  Continue supportive care  Degenerative lumbar spinal stenosis  Status post spinal stimulator, although daughter stated that this is no longer working.  Utilizes baclofen as needed and Percocet 10 mg 3 times daily as needed  Type 2 diabetes mellitus with other skin complications ([facility])  Lab Results   Component Value Date    HGBA1C 7.4 (H) 10/12/2024     Recent Labs     10/16/24  1606 10/16/24  2040 10/17/24  0656 10/17/24  1051   POCGLU 312* 208* 193* 205*   Continue sliding scale  Chronic pain disorder  Reviewed PDMP as well as facility paperwork  Prescribed Percocet 10-3 25 prn  Discontinue all IV medications, discussed with daughter  Constipation resolved with bowel regimen.   Chest pain  Appreciate cardiology recommendations.  No evidence of ACS at this time.  Hypertension  Continue metoprolol succinate 50 mg  BID    Hypothyroidism  Continue Synthroid  Acute cystitis  Patient was diagnosed with acute cystitis as an outpatient was started on ciprofloxacin 7-day course through 10/15/2024  Continue renal adjusted dose through 10/15/2024  Overactive bladder  Received Botox injections in the past  Urinary retention protocol, ahn catheter was placed by urology >700 cc's drained.  Urology recommends outpatient ahn catheter voiding trial  Cerebrovascular accident (CVA), unspecified mechanism (HCC)  Continue aspirin, statin  Chronic obstructive pulmonary disease, unspecified COPD type (HCC)  Not in acute exacerbation  Continue albuterol as needed    VTE Pharmacologic Prophylaxis: VTE Score: 6 Moderate Risk (Score 3-4) - Pharmacological DVT Prophylaxis Ordered: heparin.    Mobility:   Basic Mobility Inpatient Raw Score: 14  -[facility] Goal: 4: Move to chair/commode  -[facility] Achieved: 4: Move to chair/commode    Discussions with Specialists or Other Care Team Provider: case management    Education and Discussions with Family / Patient: patient, called daughter no answer left vm    Current Length of Stay: 6 day(s)  Current Patient Status: Inpatient   Certification Statement: The patient will continue to require additional inpatient hospital stay due to awaiting safe discharge plan  Discharge Plan: rashad to rehab    Code Status: Level 1 - Full Code    Subjective   Patient seen and examined. Had singificant bowel movement overnight. Patient feeling better. Patient and her daughter are now in agreement with rehab placement.    Objective   Vitals:   Temp (24hrs), Av.1 °F (36.7 °C), Min:97.7 °F (36.5 °C), Max:98.6 °F (37 °C)    Temp:  [97.7 °F (36.5 °C)-98.6 °F (37 °C)] 97.9 °F (36.6 °C)  HR:  [92-96] 92  Resp:  [16-18] 18  BP: (133-170)/(66-91) 170/85  SpO2:  [89 %-96 %] 96 %  Body mass index is 33.33 kg/m².     Input and Output Summary (last 24 hours):     Intake/Output Summary (Last 24 hours) at 10/17/2024 1530  Last data filed at  10/17/2024 1440  Gross per 24 hour   Intake 480 ml   Output 675 ml   Net -195 ml       Physical Exam  Vitals reviewed.   HENT:      Head: Normocephalic.      Nose: Nose normal.      Mouth/Throat:      Mouth: Mucous membranes are moist.   Eyes:      General: No scleral icterus.  Cardiovascular:      Rate and Rhythm: Normal rate and regular rhythm.   Pulmonary:      Effort: Pulmonary effort is normal. No respiratory distress.      Breath sounds: No wheezing or rales.   Abdominal:      General: There is no distension.      Palpations: Abdomen is soft.      Tenderness: There is no abdominal tenderness.   Skin:     General: Skin is warm.   Neurological:      Mental Status: She is alert.   Psychiatric:         Mood and Affect: Mood normal.         Behavior: Behavior normal.       Lines/Drains:  Lines/Drains/Airways       Active Status       Name Placement date Placement time Site Days    Urethral Catheter Latex 16 Fr. 10/14/24  1507  Latex  3                  Urinary Catheter:  Goal for removal: Voiding trial when ambulation improves                 Lab Results: I have reviewed the following results:   Results from last 7 days   Lab Units 10/17/24  0435 10/15/24  0449 10/13/24  0554   WBC Thousand/uL 9.04 8.42 8.28   HEMOGLOBIN g/dL 12.2 11.3* 11.9   PLATELETS Thousands/uL 352 268 267   MCV fL 90 89 91     Results from last 7 days   Lab Units 10/17/24  0637 10/16/24  0751 10/15/24  0449 10/12/24  0535 10/11/24  1228   SODIUM mmol/L 133* 132* 135   < > 136   POTASSIUM mmol/L 4.1 4.1 3.8   < > 5.0   CHLORIDE mmol/L 88* 90* 91*   < > 99   CO2 mmol/L 34* 34* 36*   < > 30   ANION GAP mmol/L 11 8 8   < > 7   BUN mg/dL 39* 39* 30*   < > 29*   CREATININE mg/dL 1.42* 1.54* 1.50*   < > 1.65*   CALCIUM mg/dL 9.5 9.7 9.6   < > 9.4   ALBUMIN g/dL  --   --   --   --  3.8   TOTAL BILIRUBIN mg/dL  --   --   --   --  0.34   ALK PHOS U/L  --   --   --   --  123*   ALT U/L  --   --   --   --  7   AST U/L  --   --   --   --  17   EGFR  ml/min/1.73sq m 34 31 32   < > 28   GLUCOSE RANDOM mg/dL 209* 214* 178*   < > 128    < > = values in this interval not displayed.     Results from last 7 days   Lab Units 10/17/24  0637 10/16/24  0751 10/15/24  0449 10/12/24  0535   MAGNESIUM mg/dL 2.6 2.5 2.0 2.2   PHOSPHORUS mg/dL  --   --  4.9*  --          Results from last 7 days   Lab Units 10/11/24  1455 10/11/24  1228   HS TNI 0HR ng/L  --  10   HS TNI 2HR ng/L 8  --               Results from last 7 days   Lab Units 10/17/24  1051 10/17/24  0656 10/16/24  2040 10/16/24  1606 10/16/24  1105 10/16/24  0702 10/15/24  2048 10/15/24  1608 10/15/24  1057 10/15/24  0707 10/14/24  2054 10/14/24  1557   POC GLUCOSE mg/dl 205* 193* 208* 312* 341* 225* 224* 221* 242* 196* 275* 189*     Results from last 7 days   Lab Units 10/12/24  0535   HEMOGLOBIN A1C % 7.4*           Recent Cultures (last 7 days):         Imaging:  Reviewed radiology reports from this admission: no new imaging    Last 24 Hours Medication List:     Current Facility-Administered Medications:     acetaminophen (TYLENOL) tablet 975 mg, Q8H ARY    albuterol (PROVENTIL HFA,VENTOLIN HFA) inhaler 2 puff, Q6H PRN    aluminum-magnesium hydroxide-simethicone (MAALOX) oral suspension 30 mL, Q6H PRN    aspirin chewable tablet 81 mg, Daily    atorvastatin (LIPITOR) tablet 40 mg, Daily    baclofen tablet 10 mg, BID PRN    diphenhydrAMINE (BENADRYL) tablet 25 mg, Q6H PRN    ferrous sulfate tablet 325 mg, Daily With Dinner    heparin (porcine) subcutaneous injection 5,000 Units, Q8H ARY    insulin lispro (HumALOG/ADMELOG) 100 units/mL subcutaneous injection 1-5 Units, 4x Daily (AC & HS) **AND** Fingerstick Glucose (POCT), 4x Daily AC and at bedtime    latanoprost (XALATAN) 0.005 % ophthalmic solution 1 drop, HS    levothyroxine tablet 37.5 mcg, Early Morning    lidocaine (LIDODERM) 5 % patch 1 patch, Daily    magnesium Oxide (MAG-OX) tablet 400 mg, BID    meclizine (ANTIVERT) tablet 25 mg, Daily PRN    melatonin  tablet 6 mg, HS    menthol-methyl salicylate (BENGAY) 10-15 % cream, 4x Daily PRN    metoprolol succinate (TOPROL-XL) 24 hr tablet 50 mg, Q12H ARY    ondansetron (ZOFRAN) injection 4 mg, Q6H PRN    oxyCODONE (ROXICODONE) immediate release tablet 10 mg, Q6H PRN    pantoprazole (PROTONIX) EC tablet 40 mg, Daily Before Breakfast    polyethylene glycol (MIRALAX) packet 17 g, Daily    senna-docusate sodium (SENOKOT S) 8.6-50 mg per tablet 1 tablet, BID    torsemide (DEMADEX) tablet 20 mg, BID (diuretic)    zolpidem (AMBIEN) tablet 5 mg, HS PRN      **Please Note: This note may have been constructed using a voice recognition system.**

## 2024-10-17 NOTE — CASE MANAGEMENT
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) 01/10/2024     Left ventricular outflow obstruction 2024     Obesity, Class I, BMI 30-34.9 2024     Urinary retention 2023     SADAF (acute kidney injury) ([facility]) 2023     Exertional shortness of breath 2023     Non obstructive CAD 11/15/2023     History of lumbar surgery 11/10/2023     Abnormal urinalysis 2023     Chronic obstructive pulmonary disease, unspecified COPD type ([facility]) 2023     Confusion with body shakes and blank stare 2023     Anemia in stage 3a chronic kidney disease  (HCC) 2023     Lower extremity edema 2023     Cerebrovascular accident (CVA), unspecified mechanism ([facility]) 2022     Stroke-like symptoms 2022     Prepyloric ulcer 2021     Diarrhea 2021     Mild intermittent asthma without complication 2020     S/P insertion of spinal cord stimulator 2018     Iron deficiency anemia secondary to inadequate dietary iron intake 2018     Type 2 diabetes mellitus with other  skin complications (HCC)      Failed back surgical syndrome 03/16/2018     Hypothyroidism 11/20/2017     Degenerative lumbar spinal stenosis 01/25/2017     Glaucoma 01/06/2017     Overactive bladder 08/04/2016     Dyspepsia 02/09/2016     Hypercholesterolemia 02/09/2016     Anxiety 02/09/2015     Chronic pain disorder 12/18/2013     Hypertension 06/12/2013       LOS (days): 6  Geometric Mean LOS (GMLOS) (days): 4.4  Days to GMLOS:-1.6     OBJECTIVE:  Risk of Unplanned Readmission Score: 33.25         Current admission status: Inpatient   Preferred Pharmacy:   [facility] [place] - [address]  [address]  [address]  [facility] [address]  Phone: [phone] Fax: [phone]    [facility] [place] - [address]  [facility],  [address]  [facility],  First Floor [facility] [address]  Phone: [phone] Fax: [phone]    [facility] [place], [place] - [address]  [address]  [place] [address]  Phone: [phone] Fax: [phone]    Primary Care Provider: NANY Pollock    Primary Insurance: [email or web address] [facility]  Secondary Insurance:     DISCHARGE DETAILS:    Discharge planning discussed with:: daughter Dr Baires and [facility] Liaison at [facility]  [place] of Choice: Yes  Comments - Freedom of Choice: [facility] contacted family/caregiver?: Yes  Were Treatment Team discharge recommendations reviewed with patient/caregiver?: Yes  Did patient/caregiver verbalize understanding of patient care needs?: N/A- going to facility  Were patient/caregiver advised of the risks associated with not following Treatment Team discharge recommendations?: Yes    Contacts  Patient Contacts: daughter, Dr. Venice Baires  Relationship to Patient:: Family  Contact Method: Phone  Phone Number: [phone]  Reason/Outcome: Discharge Planning, Continuity of Care, Emergency Contact    Requested Home Health Care         Is the patient interested in [facility] at  discharge?: No         Other Referral/Resources/Interventions Provided:  Interventions: Short Term Rehab         Treatment Team Recommendation: Short Term Rehab  Discharge Destination Plan:: Short Term Rehab  Transport at Discharge : Stretcher van     Number/Name of Dispatcher: [id number]  Transported by (Company and Unit #): PENDING  ETA of Transport (Date): 10/18/24  ETA of Transport (Time):  (1030 am REQUESTED AND PENDING)       Additional Comments: Patient & daughter now in agreeance to rehab and would like [facility]. CM requested stretcher van for tomorrow for 1030 AM 10/18. CM was unable to speak with [employer], has not had any return calls    Accepting Facility Name, City & State : [facility]  Receiving Facility/Agency Phone Number: Phone: [phone]  Facility/Agency Fax Number: Fax: [phone]

## 2024-10-17 NOTE — PROGRESS NOTES
Cardiology Progress Note   MD Natan Ruiz MD, FACC  Emerson Valdes DO, [facility]  MD Sindy Goldstein DO, [facility]  Torey Farnsworth DO, [facility]  ----------------------------------------------------------------  [address] [address]    Mattie Varela 79 y.o. female MRN: [id number]  Unit/Bed#: E5 -01 Encounter: [id number]      ASSESSMENT:   Acute on chronic HFpEF  LVEF 70%, grade 1 diastolic dysfunction, elevated LVOT velocities without obstruction with PG 25 mmHg with Valsalva, moderate MAC, mild TR, May 2024  Acute kidney injury  Hypertension  Dyslipidemia  Type 2 diabetes mellitus  Hypothyroid  History of CVA  DJD    PLAN:  Weight is up 2 pounds today, but would monitor over the next 24 hours and uptitrate diuretics further based on response today  Creatinine stable  Continue torsemide 20 mg twice daily  Strict I's/O's and daily weights  Keep potassium greater than 4 magnesium greater than 2  Continue aspirin, high intensity statin and Toprol-XL  If weight stable tomorrow or improving on oral torsemide, reasonable for discharge from CV perspective    Signed: Emerson Valdes DO, [facility], FASE, FASNC, FACP      History of Present Illness:  Patient seen and examined.  Admits to feeling much improved overall.  Denies chest pain, pressure, tightness or squeezing.  Denies lightheadedness, dizziness or palpitations.  Denies lower extremity swelling, orthopnea or paroxysmal nocturnal dyspnea.  Out of bed to chair.      Review of Systems:  Review of Systems   Constitutional: Negative for decreased appetite, fever, weight gain and weight loss.   HENT:  Negative for congestion and sore throat.    Eyes:  Negative for visual disturbance.   Cardiovascular:  Negative for chest pain, dyspnea on exertion, leg swelling, near-syncope and palpitations.   Respiratory:  Negative for cough and shortness of breath.    Hematologic/Lymphatic: Negative for bleeding  problem.   Skin:  Negative for rash.   Musculoskeletal:  Negative for myalgias and neck pain.   Gastrointestinal:  Negative for abdominal pain and nausea.   Neurological:  Negative for light-headedness and weakness.   Psychiatric/Behavioral:  Negative for depression.        Allergies   Allergen Reactions    Penicillins Anaphylaxis, Hives and Other (See Comments)     Other reaction(s): Unknown Reaction    Sulfa Antibiotics Anaphylaxis and Other (See Comments)     Other reaction(s): Unknown Reaction    Aspartame - Food Allergy Other (See Comments) and Hypertension     Slurred speech, weakness, stroke sx    Iodinated Contrast Media Hives     Pt has taken prep prior for contrast and has not had any break through reaction    Keflex [Cephalexin] Hives       No current facility-administered medications on file prior to encounter.     Current Outpatient Medications on File Prior to Encounter   Medication Sig    albuterol (PROVENTIL HFA,VENTOLIN HFA) 90 mcg/act inhaler Inhale 2 puffs every 6 (six) hours as needed for wheezing or shortness of breath    aspirin 81 mg chewable tablet Chew 81 mg daily    atorvastatin (LIPITOR) 40 mg tablet TAKE ONE TABLET BY MOUTH ONCE DAILY (Patient taking differently: Take 40 mg by mouth daily)    baclofen 10 mg tablet Take 10 mg by mouth 2 (two) times a day as needed for muscle spasms    docusate sodium (COLACE) 100 mg capsule Take 1 capsule (100 mg total) by mouth 2 (two) times a day (Patient taking differently: Take 100 mg by mouth 2 (two) times a day as needed)    ferrous sulfate 325 (65 Fe) mg tablet Take 1 tablet (325 mg total) by mouth daily with breakfast    latanoprost (XALATAN) 0.005 % ophthalmic solution Administer 1 drop to both eyes daily at bedtime    levothyroxine 75 mcg tablet Take 0.5 tablets (37.5 mcg total) by mouth daily in the early morning    MAGNESIUM OXIDE 400 PO Take 400 mg by mouth 2 (two) times a day    meclizine (ANTIVERT) 25 mg tablet Take 1 tablet (25 mg total) by  mouth 4 (four) times a day as needed for dizziness (Patient taking differently: Take 25 mg by mouth daily as needed for dizziness)    metFORMIN (GLUCOPHAGE) 1000 MG tablet Take 1 tablet (1,000 mg total) by mouth 2 (two) times a day with meals (Patient taking differently: Take 500 mg by mouth 2 (two) times a day with meals)    metoprolol succinate (TOPROL-XL) 50 mg 24 hr tablet Take 1 tablet (50 mg total) by mouth every 12 (twelve) hours    Milnacipran HCl (Savella) 100 MG TABS Take 1 tablet (100 mg total) by mouth daily    oxyCODONE-acetaminophen (PERCOCET)  mg per tablet Take 1 tablet by mouth every 6 (six) hours as needed    pantoprazole (PROTONIX) 40 mg tablet Take 1 tablet (40 mg total) by mouth daily    polyethylene glycol (MIRALAX) 17 g packet Take 17 g by mouth daily    senna (SENOKOT) 8.6 mg Take 2 tablets (17.2 mg total) by mouth daily at bedtime    torsemide (DEMADEX) 20 mg tablet Take 20 mg by mouth daily    Blood Glucose Monitoring Suppl (OneTouch Verio Reflect) w/Device KIT Check blood sugars twice daily. Please substitute with appropriate alternative as covered by patient's insurance. Dx: E11.65    glucose blood (OneTouch Verio) test strip Check blood sugars twice daily. Please substitute with appropriate alternative as covered by patient's insurance. Dx: E11.65    OneTouch Delica Lancets 33G MISC Check blood sugars twice daily. Please substitute with appropriate alternative as covered by patient's insurance. Dx: E11.65    [DISCONTINUED] sucralfate (CARAFATE) 1 g tablet Take 1 tablet (1 g total) by mouth 4 (four) times a day        Current Facility-Administered Medications   Medication Dose Route Frequency Provider Last Rate    acetaminophen  975 mg Oral Q8H [facility] Paco Carmona MD      albuterol  2 puff Inhalation Q6H PRN Alexys Angel DO      aluminum-magnesium hydroxide-simethicone  30 mL Oral Q6H PRN Alexys Angel DO      aspirin  81 mg Oral Daily Alexys Angel DO       atorvastatin  40 mg Oral Daily Alexys Angel DO      baclofen  10 mg Oral BID PRN Alexys Angel DO      diphenhydrAMINE  25 mg Oral Q6H PRN Bi Luke PA-C      ferrous sulfate  325 mg Oral Daily With Dinner Alexys Angel DO      heparin (porcine)  5,000 Units Subcutaneous Q8H [facility] Alexys Angel DO      insulin lispro  1-5 Units Subcutaneous 4x Daily (AC & HS) Alexys Angel DO      latanoprost  1 drop Both Eyes HS Alexys Angel DO      levothyroxine  37.5 mcg Oral Early Morning Alexys Angel DO      lidocaine  1 patch Topical Daily Susannah Mcclain PA-C      magnesium Oxide  400 mg Oral BID Alexys Angel, DO      meclizine  25 mg Oral Daily PRN Alexys Angel, DO      melatonin  6 mg Oral HS Royer Carballo, DO      menthol-methyl salicylate   Apply externally 4x Daily PRN Bi Luke PA-C      metoprolol succinate  50 mg Oral Q12H [facility] Alexys Angel DO      ondansetron  4 mg Intravenous Q6H PRN Alexys Angel, DO      oxyCODONE  10 mg Oral Q6H PRN Paco Carmona MD      pantoprazole  40 mg Oral Daily Before Breakfast Alexys Angel DO      polyethylene glycol  17 g Oral Daily Alexys Angel DO      senna-docusate sodium  1 tablet Oral BID Susannah Mcclain PA-C      torsemide  20 mg Oral BID (diuretic) Emerson Valdes, DO      zolpidem  5 mg Oral HS PRN Susannah Mcclain PA-C              Vitals:    10/16/24 1608 10/16/24 2316 10/17/24 0600 10/17/24 0658   BP: 133/66 156/91  170/85   BP Location: Left arm Left arm  Left arm   Pulse: 94 96  92   Resp: 16 18  18   Temp: 98.6 °F (37 °C) 97.7 °F (36.5 °C)  97.9 °F (36.6 °C)   TempSrc: Oral Axillary  Oral   SpO2: 91% (!) 89%  96%   Weight:   77.4 kg (170 lb 10.2 oz)    Height:         Body mass index is 33.33 kg/m².      Intake/Output Summary (Last 24 hours) at 10/17/2024 1236  Last data filed at 10/17/2024 0837  Gross per 24 hour   Intake 420 ml   Output 675 ml   Net -255 ml       Weight change: 0.9 kg (1  "lb 15.8 oz)    PHYSICAL EXAMINATION:  Gen: Awake, Alert, NAD  Head/eyes: AT/NC, pupils equal and round, Anicteric  ENT: mmm  Neck: Supple, No elevated JVP, trachea midline  Resp: CTA bilaterally no w/r/r  CV: RRR +S1, S2, No m/r/g  Abd: Soft, obese, NT/ND + BS  Ext: no LE edema bilaterally  Neuro: Follows commands, moves all extermities  Psych: Appropriate affect, happy mood, pleasant attitude, non-combative  Skin: warm; no rash, erythema or venous stasis changes on exposed skin    Lab Results:  Results from last 7 days   Lab Units 10/17/24  0435   WBC Thousand/uL 9.04   HEMOGLOBIN g/dL 12.2   HEMATOCRIT % 37.8   PLATELETS Thousands/uL 352     Results from last 7 days   Lab Units 10/17/24  0637 10/12/24  0535 10/11/24  1228   POTASSIUM mmol/L 4.1   < > 5.0   CHLORIDE mmol/L 88*   < > 99   CO2 mmol/L 34*   < > 30   BUN mg/dL 39*   < > 29*   CREATININE mg/dL 1.42*   < > 1.65*   CALCIUM mg/dL 9.5   < > 9.4   ALK PHOS U/L  --   --  123*   ALT U/L  --   --  7   AST U/L  --   --  17    < > = values in this interval not displayed.     No results found for: \"TROPONINT\"      Results from last 7 days   Lab Units 10/11/24  1455 10/11/24  1228   HS TNI 0HR ng/L  --  10   HS TNI 2HR ng/L 8  --              This note was completed in part utilizing M-Modal Fluency Direct Software.  Grammatical errors, random word insertions, spelling mistakes, and incomplete sentences may be an occasional consequence of this system secondary to software limitations, ambient noise, and hardware issues.  If you have any questions or concerns about the content, text, or information contained within the body of this dictation, please contact the provider for clarification.      "

## 2024-10-17 NOTE — PLAN OF CARE
Problem: PHYSICAL THERAPY ADULT  Goal: Performs mobility at highest level of function for planned discharge setting.  See evaluation for individualized goals.  Description: Treatment/Interventions: Functional transfer training, LE strengthening/ROM, Therapeutic exercise, Endurance training, Patient/family training, Bed mobility, Spoke to nursing, Gait training, OT          See flowsheet documentation for full assessment, interventions and recommendations.  Outcome: Progressing  Note: Prognosis: Fair  Problem List: Decreased strength, Decreased endurance, Impaired balance, Decreased mobility, Obesity, Orthopedic restrictions, Pain  Assessment: Pt seen for PT treatment session this date with interventions consisting of transfer training, gait training, and HEP, and education provided as needed for safety and direction to improve functional mobility, safety awareness, and activity tolerance. Pt agreeable to PT treatment session upon arrival, pt found seated out of bed in recliner. At end of session, pt left seated out of bed in recliner with all needs in reach. In comparison to previous session, pt with improvement in activity tolerance, endurance, standing balance, ambulation distances, ambulatory balance, and functional mobility. Pt is showing progress with improvements as noted.  Pt  is requiring less assistance for transfers and ambulation on levels with use of Rw.pt  progressed from min assist x1 for sit to stand and stand to sit tranfers w supervision x1 with verbal cues for hand placement and body positioning/ alignment with chair or commode prior to sitting. Improved activity tolerance with progressing of ambulation distances to 25' with use of rw.  Pt  is limited by fatigue and low back pain. No gross LOB noted.  Gait deviations as noted in flow sheet.  Increased time is required to perform and complete all mobility.  Two episodes of incontinence this session requiring total assist for elvira anal hygiene,  donning and doffing of b/l slipper socks and gown.  Pt  performs seated and long sitting b/l le arom and isometric exercises  x 10-15 reps to tolerance.  Increased time required to perform and complete all mobility and there ex.  Continue to recommend  level II moderate rehab resource intensity (unless EDWIN is able to care for pt at current level of mobilty then home with HHPT)  at time of d/c in order to maximize pt's functional independence and safety w/ mobility. Pt continues to be functioning below baseline level. PT will continue to see pt while here in order to address the deficits listed above and provide interventions consistent w/ POC in effort to achieve STGs.    The patient's AM-PAC Basic Mobility Inpatient Short Form Raw Score is 14. A raw score less than 16 suggests the patient may benefit from discharge to post-acute rehabilitation services. Please also refer to the recommendation of the Physical Therapist for safe discharge planning.        Rehab Resource Intensity Level, PT: II (Moderate Resource Intensity)    See flowsheet documentation for full assessment.

## 2024-10-17 NOTE — ASSESSMENT & PLAN NOTE
Reviewed PDMP as well as facility paperwork  Prescribed Percocet 10-3 25 prn  Discontinue all IV medications, discussed with daughter  Constipation resolved with bowel regimen.

## 2024-10-17 NOTE — ASSESSMENT & PLAN NOTE
Results from last 7 days   Lab Units 10/17/24  0637 10/16/24  0751 10/15/24  0449 10/14/24  0457   CREATININE mg/dL 1.42* 1.54* 1.50* 1.33*   Baseline 1.1-1.3.  Etiology suspected to be due to cardiorenal. Stable.

## 2024-10-17 NOTE — CASE MANAGEMENT
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     Generalized edema 2024     Constipation 2024     Leg pain, right 2024     Acute on chronic diastolic congestive heart failure (HCC) 2024     s/p Medtronic loop recorder 3/2/2024 2024     Moderate protein-calorie malnutrition (HCC) 2024     Hypertensive urgency 2024     AMS (altered mental status) 2024     Encounter for examination for admission to nursing home 2024     Stage 3a chronic kidney disease ([facility]) 01/10/2024     Left ventricular outflow obstruction 2024     Obesity, Class I, BMI 30-34.9 2024     Urinary retention 2023     SADAF (acute kidney injury) ([facility]) 2023     Exertional shortness of breath 2023     Non obstructive CAD 11/15/2023     History of lumbar surgery 11/10/2023     Abnormal urinalysis 2023     Chronic obstructive pulmonary disease, unspecified COPD type ([facility]) 2023     Confusion with body shakes and blank stare 2023     Anemia in stage 3a chronic kidney disease  (HCC) 2023     Lower extremity edema 2023     Cerebrovascular accident (CVA), unspecified mechanism ([facility]) 2022     Stroke-like symptoms 2022     Prepyloric ulcer 2021     Diarrhea 2021     Mild intermittent asthma without complication 2020     S/P insertion of spinal cord stimulator 2018     Iron deficiency anemia secondary to inadequate dietary iron intake 2018     Type 2 diabetes mellitus with other  skin complications (HCC)      Failed back surgical syndrome 03/16/2018     Hypothyroidism 11/20/2017     Degenerative lumbar spinal stenosis 01/25/2017     Glaucoma 01/06/2017     Overactive bladder 08/04/2016     Dyspepsia 02/09/2016     Hypercholesterolemia 02/09/2016     Anxiety 02/09/2015     Chronic pain disorder 12/18/2013     Hypertension 06/12/2013       LOS (days): 6  Geometric Mean LOS (GMLOS) (days): 4.4  Days to GMLOS:-1.3     OBJECTIVE:  Risk of Unplanned Readmission Score: 33.47         Current admission status: Inpatient   Preferred Pharmacy:   [facility] [place] - [address]  [address]  [address]  [facility] [address]  Phone: [phone] Fax: [phone]    [facility] [place] - [address]  [facility],  [address]  [facility],  First Floor [facility] [address]  Phone: [phone] Fax: [phone]    [facility] - [place], [place] - [address]  [address]  [place] [address]  Phone: [phone] Fax: [phone]    Primary Care Provider: NANY Pollock    Primary Insurance: [employer] [facility]  Secondary Insurance:     DISCHARGE DETAILS:    Discharge planning discussed with:: patient and daughter Dr. Baires  Freedom of Choice: Yes  Comments - Freedom of Choice: wants her mom to return to [facility] with services arranged by [facility]   contacted family/caregiver?: Yes  Were Treatment Team discharge recommendations reviewed with patient/caregiver?: Yes  Did patient/caregiver verbalize understanding of patient care needs?: N/A- going to facility  Were patient/caregiver advised of the risks associated with not following Treatment Team discharge recommendations?: Yes    Contacts  Patient Contacts: daughter, Dr. Venice Baires  Relationship to Patient:: Family  Contact Method: Phone  Phone Number: [phone]  Reason/Outcome: Discharge Planning, Continuity of Care, Emergency Contact    Requested Home Health Care         Is  the patient interested in [facility] at discharge?: Yes  Home Health Discipline requested:: Occupational Therapy, Physical Therapy  Home Health Agency Name:: Other  Home Health Follow-Up Provider:: PCP  Home Health Services Needed:: Evaluate Functional Status and Safety, Strengthening/Theraputic Exercises to Improve Function, Gait/ADL Training  Homebound Criteria Met:: Uses an Assist Device (i.e. cane, walker, etc), Requires the Assistance of Another Person for Safe Ambulation or to Leave the Home  Supporting Clincal Findings:: Limited Endurance    DME Referral Provided  Referral made for DME?: No    Other Referral/Resources/Interventions Provided:  Interventions: C    Treatment Team Recommendation: Short Term Rehab  Discharge Destination Plan:: Home with Home Health Care  Transport at Discharge : Stretcher van, BLS Ambulance     Additional Comments: CAPRI spoke with patient patient states she isn't sure if she wants to go to rehab said she would go to [facility] if needed. CM called patient's daughter Dr Baires, who states she would like patient to return to [facility] w/ services arranged by [facility] for therapy    Update:    CAPRI spoke with Antonella and Dheeraj at [facility], they will discuss with management regarding a return to [facility] vs STR at [facility] and let CM know.

## 2024-10-17 NOTE — ASSESSMENT & PLAN NOTE
Wt Readings from Last 3 Encounters:   10/17/24 77.4 kg (170 lb 10.2 oz)   10/02/24 85.5 kg (188 lb 9.6 oz)   09/26/24 86.2 kg (190 lb)     79-year-old female was admitted due to volume overload, weight gain, and edema secondary to acute on chronic diastolic congestive heart failure.  Diuretic management per cardiology. Currently on po diuretics, anticipate discharge clearance tomorrow from a CV standpoint  Daily weights, I's and O's  Awaiting discharge to rehab tomorrow

## 2024-10-17 NOTE — PLAN OF CARE
Problem: Potential for Falls  Goal: Patient will remain free of falls  Description: INTERVENTIONS:  - Educate patient/family on patient safety including physical limitations  - Instruct patient to call for assistance with activity   - Consult OT/PT to assist with strengthening/mobility   - Keep Call bell within reach  - Keep bed low and locked with side rails adjusted as appropriate  - Keep care items and personal belongings within reach  - Initiate and maintain comfort rounds  - Make Fall Risk Sign visible to staff  - Offer Toileting every 2 Hours, in advance of need  - Initiate/Maintain bed alarm  - Apply yellow socks and bracelet for high fall risk patients  - Consider moving patient to room near nurses station  Outcome: Progressing     Problem: PAIN - ADULT  Goal: Verbalizes/displays adequate comfort level or baseline comfort level  Description: Interventions:  - Encourage patient to monitor pain and request assistance  - Assess pain using appropriate pain scale  - Administer analgesics based on type and severity of pain and evaluate response  - Implement non-pharmacological measures as appropriate and evaluate response  - Consider cultural and social influences on pain and pain management  - Notify physician/advanced practitioner if interventions unsuccessful or patient reports new pain  Outcome: Progressing     Problem: INFECTION - ADULT  Goal: Absence or prevention of progression during hospitalization  Description: INTERVENTIONS:  - Assess and monitor for signs and symptoms of infection  - Monitor lab/diagnostic results  - Monitor all insertion sites, i.e. indwelling lines, tubes, and drains  - Monitor endotracheal if appropriate and nasal secretions for changes in amount and color  - [place] appropriate cooling/warming therapies per order  - Administer medications as ordered  - Instruct and encourage patient and family to use good hand hygiene technique  - Identify and instruct in appropriate isolation  precautions for identified infection/condition  Outcome: Progressing  Goal: Absence of fever/infection during neutropenic period  Description: INTERVENTIONS:  - Monitor WBC    Outcome: Progressing     Problem: SAFETY ADULT  Goal: Patient will remain free of falls  Description: INTERVENTIONS:  - Educate patient/family on patient safety including physical limitations  - Instruct patient to call for assistance with activity   - Consult OT/PT to assist with strengthening/mobility   - Keep Call bell within reach  - Keep bed low and locked with side rails adjusted as appropriate  - Keep care items and personal belongings within reach  - Initiate and maintain comfort rounds  - Make Fall Risk Sign visible to staff  - Offer Toileting every 2 Hours, in advance of need  - Initiate/Maintain bed alarm  - Apply yellow socks and bracelet for high fall risk patients  - Consider moving patient to room near nurses station  Outcome: Progressing  Goal: Maintain or return to baseline ADL function  Description: INTERVENTIONS:  - Educate patient/family on patient safety including physical limitations  - Instruct patient to call for assistance with activity   - Consult OT/PT to assist with strengthening/mobility   - Keep Call bell within reach  - Keep bed low and locked with side rails adjusted as appropriate  - Keep care items and personal belongings within reach  - Initiate and maintain comfort rounds  - Make Fall Risk Sign visible to staff  - Offer Toileting every 2 Hours, in advance of need  - Initiate/Maintain bed alarm  - Apply yellow socks and bracelet for high fall risk patients  - Consider moving patient to room near nurses station  Outcome: Progressing  Goal: Maintains/Returns to pre admission functional level  Description: INTERVENTIONS:  - Perform AM-PAC 6 Click Basic Mobility/ Daily Activity assessment daily.  - Set and communicate daily mobility goal to care team and patient/family/caregiver.   - Collaborate with  rehabilitation services on mobility goals if consulted  - Perform Range of Motion 3 times a day.  - Reposition patient every 2 hours.  - Dangle patient 3 times a day  - Stand patient 3 times a day  - Ambulate patient 3 times a day  - Out of bed to chair 3 times a day   - Out of bed for meals 3 times a day  - Out of bed for toileting  - Record patient progress and toleration of activity level   Outcome: Progressing     Problem: DISCHARGE PLANNING  Goal: Discharge to home or other facility with appropriate resources  Description: INTERVENTIONS:  - Identify barriers to discharge w/patient and caregiver  - Arrange for needed discharge resources and transportation as appropriate  - Identify discharge learning needs (meds, wound care, etc.)  - Arrange for interpretive services to assist at discharge as needed  - Refer to Case Management Department for coordinating discharge planning if the patient needs post-hospital services based on physician/advanced practitioner order or complex needs related to functional status, cognitive ability, or social support system  Outcome: Progressing     Problem: Knowledge Deficit  Goal: Patient/family/caregiver demonstrates understanding of disease process, treatment plan, medications, and discharge instructions  Description: Complete learning assessment and assess knowledge base.  Interventions:  - Provide teaching at level of understanding  - Provide teaching via preferred learning methods  Outcome: Progressing     Problem: Prexisting or High Potential for Compromised Skin Integrity  Goal: Skin integrity is maintained or improved  Description: INTERVENTIONS:  - Identify patients at risk for skin breakdown  - Assess and monitor skin integrity  - Assess and monitor nutrition and hydration status  - Monitor labs   - Assess for incontinence   - Turn and reposition patient  - Assist with mobility/ambulation  - Relieve pressure over bony prominences  - Avoid friction and shearing  - Provide  appropriate hygiene as needed including keeping skin clean and dry  - Evaluate need for skin moisturizer/barrier cream  - Collaborate with interdisciplinary team   - Patient/family teaching  - Consider wound care consult   Outcome: Progressing     Problem: Nutrition/Hydration-ADULT  Goal: Nutrient/Hydration intake appropriate for improving, restoring or maintaining nutritional needs  Description: Monitor and assess patient's nutrition/hydration status for malnutrition. Collaborate with interdisciplinary team and initiate plan and interventions as ordered.  Monitor patient's weight and dietary intake as ordered or per policy. Utilize nutrition screening tool and intervene as necessary. Determine patient's food preferences and provide high-protein, high-caloric foods as appropriate.     INTERVENTIONS:  - Monitor oral intake, urinary output, labs, and treatment plans  - Assess nutrition and hydration status and recommend course of action  - Evaluate amount of meals eaten  - Assist patient with eating if necessary   - Allow adequate time for meals  - Recommend/ encourage appropriate diets, oral nutritional supplements, and vitamin/mineral supplements  - Order, calculate, and assess calorie counts as needed  - Recommend, monitor, and adjust tube feedings and TPN/PPN based on assessed needs  - Assess need for intravenous fluids  - Provide specific nutrition/hydration education as appropriate  - Include patient/family/caregiver in decisions related to nutrition  Outcome: Progressing

## 2024-10-17 NOTE — RESTORATIVE TECHNICIAN NOTE
Restorative Technician Note      Patient Name: Mattie Varela     Restorative Tech Visit Date: 10/17/24  Note Type: Mobility  Patient Position Upon Consult: Bedside chair  Activity Performed: Ambulated  Assistive Device: Roller walker  Patient Position at End of Consult: Bedside chair; All needs within reach; Bed/Chair alarm activated            ns

## 2024-10-17 NOTE — ASSESSMENT & PLAN NOTE
Lab Results   Component Value Date    HGBA1C 7.4 (H) 10/12/2024     Recent Labs     10/16/24  1606 10/16/24  2040 10/17/24  0656 10/17/24  1051   POCGLU 312* 208* 193* 205*   Continue sliding scale

## 2024-10-18 ENCOUNTER — APPOINTMENT (INPATIENT)
Dept: RADIOLOGY | Facility: HOSPITAL | Age: 79
DRG: 871 | End: 2024-10-18
Payer: MEDICARE

## 2024-10-18 PROBLEM — N18.9 ACUTE KIDNEY INJURY SUPERIMPOSED ON CHRONIC KIDNEY DISEASE  (HCC): Status: ACTIVE | Noted: 2024-10-18

## 2024-10-18 PROBLEM — N17.9 ACUTE KIDNEY INJURY SUPERIMPOSED ON CHRONIC KIDNEY DISEASE  (HCC): Status: ACTIVE | Noted: 2024-10-18

## 2024-10-18 LAB
2HR DELTA HS TROPONIN: 6 NG/L
ANION GAP SERPL CALCULATED.3IONS-SCNC: 11 MMOL/L (ref 4–13)
BUN SERPL-MCNC: 42 MG/DL (ref 5–25)
CALCIUM SERPL-MCNC: 10.2 MG/DL (ref 8.4–10.2)
CARDIAC TROPONIN I PNL SERPL HS: 24 NG/L (ref 8–18)
CARDIAC TROPONIN I PNL SERPL HS: 33 NG/L
CARDIAC TROPONIN I PNL SERPL HS: 39 NG/L
CHLORIDE SERPL-SCNC: 88 MMOL/L (ref 96–108)
CO2 SERPL-SCNC: 35 MMOL/L (ref 21–32)
CREAT SERPL-MCNC: 1.7 MG/DL (ref 0.6–1.3)
ERYTHROCYTE [DISTWIDTH] IN BLOOD BY AUTOMATED COUNT: 13.4 % (ref 11.6–15.1)
GFR SERPL CREATININE-BSD FRML MDRD: 27 ML/MIN/1.73SQ M
GLUCOSE SERPL-MCNC: 166 MG/DL (ref 65–140)
GLUCOSE SERPL-MCNC: 209 MG/DL (ref 65–140)
GLUCOSE SERPL-MCNC: 258 MG/DL (ref 65–140)
GLUCOSE SERPL-MCNC: 260 MG/DL (ref 65–140)
GLUCOSE SERPL-MCNC: 273 MG/DL (ref 65–140)
HCT VFR BLD AUTO: 35.8 % (ref 34.8–46.1)
HGB BLD-MCNC: 11.5 G/DL (ref 11.5–15.4)
MAGNESIUM SERPL-MCNC: 2.5 MG/DL (ref 1.9–2.7)
MCH RBC QN AUTO: 28.3 PG (ref 26.8–34.3)
MCHC RBC AUTO-ENTMCNC: 32.1 G/DL (ref 31.4–37.4)
MCV RBC AUTO: 88 FL (ref 82–98)
PLATELET # BLD AUTO: 319 THOUSANDS/UL (ref 149–390)
PMV BLD AUTO: 10 FL (ref 8.9–12.7)
POTASSIUM SERPL-SCNC: 3.7 MMOL/L (ref 3.5–5.3)
RBC # BLD AUTO: 4.07 MILLION/UL (ref 3.81–5.12)
SODIUM SERPL-SCNC: 134 MMOL/L (ref 135–147)
WBC # BLD AUTO: 8.6 THOUSAND/UL (ref 4.31–10.16)

## 2024-10-18 PROCEDURE — 97110 THERAPEUTIC EXERCISES: CPT

## 2024-10-18 PROCEDURE — 97116 GAIT TRAINING THERAPY: CPT

## 2024-10-18 PROCEDURE — 71045 X-RAY EXAM CHEST 1 VIEW: CPT

## 2024-10-18 PROCEDURE — 99232 SBSQ HOSP IP/OBS MODERATE 35: CPT | Performed by: INTERNAL MEDICINE

## 2024-10-18 PROCEDURE — 99232 SBSQ HOSP IP/OBS MODERATE 35: CPT | Performed by: STUDENT IN AN ORGANIZED HEALTH CARE EDUCATION/TRAINING PROGRAM

## 2024-10-18 PROCEDURE — 85027 COMPLETE CBC AUTOMATED: CPT | Performed by: STUDENT IN AN ORGANIZED HEALTH CARE EDUCATION/TRAINING PROGRAM

## 2024-10-18 PROCEDURE — 83735 ASSAY OF MAGNESIUM: CPT | Performed by: STUDENT IN AN ORGANIZED HEALTH CARE EDUCATION/TRAINING PROGRAM

## 2024-10-18 PROCEDURE — 84484 ASSAY OF TROPONIN QUANT: CPT | Performed by: STUDENT IN AN ORGANIZED HEALTH CARE EDUCATION/TRAINING PROGRAM

## 2024-10-18 PROCEDURE — 97530 THERAPEUTIC ACTIVITIES: CPT

## 2024-10-18 PROCEDURE — 80048 BASIC METABOLIC PNL TOTAL CA: CPT | Performed by: STUDENT IN AN ORGANIZED HEALTH CARE EDUCATION/TRAINING PROGRAM

## 2024-10-18 PROCEDURE — 84484 ASSAY OF TROPONIN QUANT: CPT | Performed by: INTERNAL MEDICINE

## 2024-10-18 PROCEDURE — 82948 REAGENT STRIP/BLOOD GLUCOSE: CPT

## 2024-10-18 RX ADMIN — ALUMINUM HYDROXIDE, MAGNESIUM HYDROXIDE, DIMETHICONE 30 ML: 400; 400; 40 SUSPENSION ORAL at 14:07

## 2024-10-18 RX ADMIN — DIPHENHYDRAMINE HYDROCHLORIDE 25 MG: 25 TABLET ORAL at 21:29

## 2024-10-18 RX ADMIN — LEVOTHYROXINE SODIUM 37.5 MCG: 75 TABLET ORAL at 05:04

## 2024-10-18 RX ADMIN — TORSEMIDE 20 MG: 20 TABLET ORAL at 08:12

## 2024-10-18 RX ADMIN — HEPARIN SODIUM 5000 UNITS: 5000 INJECTION INTRAVENOUS; SUBCUTANEOUS at 05:05

## 2024-10-18 RX ADMIN — ALBUTEROL SULFATE 2 PUFF: 90 AEROSOL, METERED RESPIRATORY (INHALATION) at 11:34

## 2024-10-18 RX ADMIN — SENNOSIDES AND DOCUSATE SODIUM 1 TABLET: 8.6; 5 TABLET ORAL at 08:12

## 2024-10-18 RX ADMIN — INSULIN LISPRO 2 UNITS: 100 INJECTION, SOLUTION INTRAVENOUS; SUBCUTANEOUS at 11:34

## 2024-10-18 RX ADMIN — ATORVASTATIN CALCIUM 40 MG: 40 TABLET, FILM COATED ORAL at 08:12

## 2024-10-18 RX ADMIN — Medication 400 MG: at 21:14

## 2024-10-18 RX ADMIN — ASPIRIN 81 MG CHEWABLE TABLET 81 MG: 81 TABLET CHEWABLE at 08:12

## 2024-10-18 RX ADMIN — INSULIN LISPRO 3 UNITS: 100 INJECTION, SOLUTION INTRAVENOUS; SUBCUTANEOUS at 16:58

## 2024-10-18 RX ADMIN — ACETAMINOPHEN 975 MG: 325 TABLET, FILM COATED ORAL at 21:14

## 2024-10-18 RX ADMIN — METOPROLOL SUCCINATE 50 MG: 50 TABLET, EXTENDED RELEASE ORAL at 08:12

## 2024-10-18 RX ADMIN — FERROUS SULFATE TAB 325 MG (65 MG ELEMENTAL FE) 325 MG: 325 (65 FE) TAB at 16:57

## 2024-10-18 RX ADMIN — OXYCODONE HYDROCHLORIDE 10 MG: 10 TABLET ORAL at 16:57

## 2024-10-18 RX ADMIN — ACETAMINOPHEN 975 MG: 325 TABLET, FILM COATED ORAL at 05:05

## 2024-10-18 RX ADMIN — ACETAMINOPHEN 975 MG: 325 TABLET, FILM COATED ORAL at 14:07

## 2024-10-18 RX ADMIN — OXYCODONE HYDROCHLORIDE 10 MG: 10 TABLET ORAL at 08:12

## 2024-10-18 RX ADMIN — INSULIN LISPRO 1 UNITS: 100 INJECTION, SOLUTION INTRAVENOUS; SUBCUTANEOUS at 08:11

## 2024-10-18 RX ADMIN — PANTOPRAZOLE SODIUM 40 MG: 40 TABLET, DELAYED RELEASE ORAL at 06:34

## 2024-10-18 RX ADMIN — MELATONIN 6 MG: 3 TAB ORAL at 21:14

## 2024-10-18 RX ADMIN — BACLOFEN 10 MG: 10 TABLET ORAL at 18:27

## 2024-10-18 RX ADMIN — MENTHOL 10%, METHYL SALICYLATE 15%: 10; 15 CREAM TOPICAL at 18:28

## 2024-10-18 RX ADMIN — POLYETHYLENE GLYCOL 3350 17 G: 17 POWDER, FOR SOLUTION ORAL at 08:12

## 2024-10-18 RX ADMIN — HEPARIN SODIUM 5000 UNITS: 5000 INJECTION INTRAVENOUS; SUBCUTANEOUS at 14:07

## 2024-10-18 RX ADMIN — HEPARIN SODIUM 5000 UNITS: 5000 INJECTION INTRAVENOUS; SUBCUTANEOUS at 21:14

## 2024-10-18 RX ADMIN — Medication 400 MG: at 08:12

## 2024-10-18 RX ADMIN — METOPROLOL SUCCINATE 50 MG: 50 TABLET, EXTENDED RELEASE ORAL at 21:20

## 2024-10-18 RX ADMIN — LIDOCAINE 1 PATCH: 700 PATCH TOPICAL at 08:12

## 2024-10-18 RX ADMIN — INSULIN LISPRO 2 UNITS: 100 INJECTION, SOLUTION INTRAVENOUS; SUBCUTANEOUS at 21:28

## 2024-10-18 RX ADMIN — LATANOPROST 1 DROP: 50 SOLUTION OPHTHALMIC at 21:19

## 2024-10-18 NOTE — TELEPHONE ENCOUNTER
Patient likely to be discharged to the following -      Accepting Facility Name, City & State : [facility]  Receiving Facility/Agency Phone Number: Phone: [phone]  Facility/Agency Fax Number: Fax: [phone]      Will monitor for d/c and call to set up the TOV

## 2024-10-18 NOTE — ASSESSMENT & PLAN NOTE
Lab Results   Component Value Date    HGBA1C 7.4 (H) 10/12/2024     Recent Labs     10/17/24  1051 10/17/24  1606 10/17/24  2047 10/18/24  0706   POCGLU 205* 231* 225* 166*     Continue sliding scale

## 2024-10-18 NOTE — PROGRESS NOTES
Progress Note - Hospitalist   Name: Mattie Varela 79 y.o. female I MRN: [id number]  Unit/Bed#: E5 -01 I Date of Admission: 10/11/2024   Date of Service: 10/18/2024 I Hospital Day: 7    Assessment & Plan  Acute on chronic diastolic congestive heart failure (HCC)  Wt Readings from Last 3 Encounters:   10/18/24 74.3 kg (163 lb 12.8 oz)   10/02/24 85.5 kg (188 lb 9.6 oz)   09/26/24 86.2 kg (190 lb)     79-year-old female was admitted due to volume overload, weight gain, and edema secondary to acute on chronic diastolic congestive heart failure.  Diuretic management per cardiology. Diuretics on hold for now due to SADAF.   Daily weights, I's and O's  Discharge with torsemide tomorrow as long as renal function improves.  SADAF (acute kidney injury) (HCC)  Results from last 7 days   Lab Units 10/18/24  0448 10/17/24  0637 10/16/24  0751 10/15/24  0449   CREATININE mg/dL 1.70* 1.42* 1.54* 1.50*   Baseline 1.1-1.3.  Etiology suspected to be due to cardiorenal. Stable.  Acute low back pain  Complaining of low back pain  No red flags.  No acute fractures and CT spine lumbar without contrast study  Continue supportive care  Degenerative lumbar spinal stenosis  Status post spinal stimulator, although daughter stated that this is no longer working.  Utilizes baclofen as needed and Percocet 10 mg 3 times daily as needed  Type 2 diabetes mellitus with other skin complications ([facility])  Lab Results   Component Value Date    HGBA1C 7.4 (H) 10/12/2024     Recent Labs     10/17/24  1606 10/17/24  2047 10/18/24  0706 10/18/24  1101   POCGLU 231* 225* 166* 258*   Continue sliding scale  Chronic pain disorder  Reviewed PDMP as well as facility paperwork  Prescribed Percocet 10-3 25 prn  Discontinue all IV medications, discussed with daughter  Constipation resolved with bowel regimen.   Chest pain  Appreciate cardiology recommendations.  No evidence of ACS at this time.  Hypertension  Continue metoprolol succinate 50 mg BID     Hypothyroidism  Continue Synthroid  Acute cystitis  Patient was diagnosed with acute cystitis as an outpatient. Completed 7 day course/    Overactive bladder  Received Botox injections in the past  Urinary retention protocol, ahn catheter was placed by urology >700 cc's drained.  Urology recommends outpatient ahn catheter voiding trial  Cerebrovascular accident (CVA), unspecified mechanism (HCC)  Continue aspirin, statin  Chronic obstructive pulmonary disease, unspecified COPD type (HCC)  Not in acute exacerbation  Continue albuterol as needed  Acute kidney injury superimposed on chronic kidney disease  (HCC)  Lab Results   Component Value Date    EGFR 27 10/18/2024    EGFR 34 10/17/2024    EGFR 31 10/16/2024    CREATININE 1.70 (H) 10/18/2024    CREATININE 1.42 (H) 10/17/2024    CREATININE 1.54 (H) 10/16/2024     Possibly due to overdiuresis, hold torsemide today. Monitor overnight.     VTE Pharmacologic Prophylaxis: VTE Score: 6 Moderate Risk (Score 3-4) - Pharmacological DVT Prophylaxis Ordered: heparin.    Mobility:   Basic Mobility Inpatient Raw Score: 16  -[facility] Goal: 5: Stand one or more mins  -HL Achieved: 7: Walk 25 feet or more    Discussions with Specialists or Other Care Team Provider: nephrology    Education and Discussions with Family / Patient: patient, daughter    Current Length of Stay: 7 day(s)  Current Patient Status: Inpatient   Certification Statement: The patient will continue to require additional inpatient hospital stay due to filiberto  Discharge Plan: Anticipate discharge tomorrow to rehab facility.    Code Status: Level 1 - Full Code    Subjective   Patient seen and examined. Denies any chest pain or shortness of breath. Still with chronic back pain. No more constipation.    Objective   Vitals:   Temp (24hrs), Av.8 °F (36.6 °C), Min:97.2 °F (36.2 °C), Max:98.1 °F (36.7 °C)    Temp:  [97.2 °F (36.2 °C)-98.1 °F (36.7 °C)] 97.2 °F (36.2 °C)  HR:  [86-97] 86  Resp:  [15-18] 15  BP:  (104-127)/(58-77) 127/58  SpO2:  [93 %-95 %] 95 %  Body mass index is 31.99 kg/m².     Input and Output Summary (last 24 hours):     Intake/Output Summary (Last 24 hours) at 10/18/2024 1201  Last data filed at 10/18/2024 0902  Gross per 24 hour   Intake 780 ml   Output 1450 ml   Net -670 ml       Physical Exam  Vitals reviewed.   Constitutional:       General: She is not in acute distress.  HENT:      Head: Normocephalic.      Nose: Nose normal.      Mouth/Throat:      Mouth: Mucous membranes are moist.   Eyes:      General: No scleral icterus.  Cardiovascular:      Rate and Rhythm: Normal rate.   Pulmonary:      Effort: Pulmonary effort is normal. No respiratory distress.   Abdominal:      General: There is no distension.      Palpations: Abdomen is soft.      Tenderness: There is no abdominal tenderness.   Skin:     General: Skin is warm.   Neurological:      Mental Status: She is alert.   Psychiatric:         Mood and Affect: Mood normal.         Behavior: Behavior normal.       Lines/Drains:  Lines/Drains/Airways       Active Status       Name Placement date Placement time Site Days    Urethral Catheter Latex 16 Fr. 10/14/24  1507  Latex  3                  Urinary Catheter:  Goal for removal: outpatient tov                 Lab Results: I have reviewed the following results:   Results from last 7 days   Lab Units 10/18/24  0448 10/17/24  0435 10/15/24  0449   WBC Thousand/uL 8.60 9.04 8.42   HEMOGLOBIN g/dL 11.5 12.2 11.3*   PLATELETS Thousands/uL 319 352 268   MCV fL 88 90 89     Results from last 7 days   Lab Units 10/18/24  0448 10/17/24  0637 10/16/24  0751 10/12/24  0535 10/11/24  1228   SODIUM mmol/L 134* 133* 132*   < > 136   POTASSIUM mmol/L 3.7 4.1 4.1   < > 5.0   CHLORIDE mmol/L 88* 88* 90*   < > 99   CO2 mmol/L 35* 34* 34*   < > 30   ANION GAP mmol/L 11 11 8   < > 7   BUN mg/dL 42* 39* 39*   < > 29*   CREATININE mg/dL 1.70* 1.42* 1.54*   < > 1.65*   CALCIUM mg/dL 10.2 9.5 9.7   < > 9.4   ALBUMIN g/dL   --   --   --   --  3.8   TOTAL BILIRUBIN mg/dL  --   --   --   --  0.34   ALK PHOS U/L  --   --   --   --  123*   ALT U/L  --   --   --   --  7   AST U/L  --   --   --   --  17   EGFR ml/min/1.73sq m 27 34 31   < > 28   GLUCOSE RANDOM mg/dL 209* 209* 214*   < > 128    < > = values in this interval not displayed.     Results from last 7 days   Lab Units 10/18/24  0448 10/17/24  0637 10/16/24  0751 10/15/24  0449 10/12/24  0535   MAGNESIUM mg/dL 2.5 2.6 2.5 2.0 2.2   PHOSPHORUS mg/dL  --   --   --  4.9*  --          Results from last 7 days   Lab Units 10/11/24  1455 10/11/24  1228   HS TNI 0HR ng/L  --  10   HS TNI 2HR ng/L 8  --               Results from last 7 days   Lab Units 10/18/24  1101 10/18/24  0706 10/17/24  2047 10/17/24  1606 10/17/24  1051 10/17/24  0656 10/16/24  2040 10/16/24  1606 10/16/24  1105 10/16/24  0702 10/15/24  2048 10/15/24  1608   POC GLUCOSE mg/dl 258* 166* 225* 231* 205* 193* 208* 312* 341* 225* 224* 221*     Results from last 7 days   Lab Units 10/12/24  0535   HEMOGLOBIN A1C % 7.4*           Recent Cultures (last 7 days):         Imaging:  Reviewed radiology reports from this admission: ct spine lumbar    Last 24 Hours Medication List:     Current Facility-Administered Medications:     acetaminophen (TYLENOL) tablet 975 mg, Q8H ARY    albuterol (PROVENTIL HFA,VENTOLIN HFA) inhaler 2 puff, Q6H PRN    aluminum-magnesium hydroxide-simethicone (MAALOX) oral suspension 30 mL, Q6H PRN    aspirin chewable tablet 81 mg, Daily    atorvastatin (LIPITOR) tablet 40 mg, Daily    baclofen tablet 10 mg, BID PRN    diphenhydrAMINE (BENADRYL) tablet 25 mg, Q6H PRN    ferrous sulfate tablet 325 mg, Daily With Dinner    heparin (porcine) subcutaneous injection 5,000 Units, Q8H ARY    hydrocortisone (ANUSOL-HC) 2.5 % rectal cream, 4x Daily PRN    insulin lispro (HumALOG/ADMELOG) 100 units/mL subcutaneous injection 1-5 Units, 4x Daily (AC & HS) **AND** Fingerstick Glucose (POCT), 4x Daily AC and at  bedtime    latanoprost (XALATAN) 0.005 % ophthalmic solution 1 drop, HS    levothyroxine tablet 37.5 mcg, Early Morning    lidocaine (LIDODERM) 5 % patch 1 patch, Daily    magnesium Oxide (MAG-OX) tablet 400 mg, BID    meclizine (ANTIVERT) tablet 25 mg, Daily PRN    melatonin tablet 6 mg, HS    menthol-methyl salicylate (BENGAY) 10-15 % cream, 4x Daily PRN    metoprolol succinate (TOPROL-XL) 24 hr tablet 50 mg, Q12H ARY    ondansetron (ZOFRAN) injection 4 mg, Q6H PRN    oxyCODONE (ROXICODONE) immediate release tablet 10 mg, Q6H PRN    pantoprazole (PROTONIX) EC tablet 40 mg, Daily Before Breakfast    polyethylene glycol (MIRALAX) packet 17 g, Daily    senna-docusate sodium (SENOKOT S) 8.6-50 mg per tablet 1 tablet, BID    zolpidem (AMBIEN) tablet 5 mg, HS PRN      **Please Note: This note may have been constructed using a voice recognition system.**

## 2024-10-18 NOTE — ASSESSMENT & PLAN NOTE
Wt Readings from Last 3 Encounters:   10/18/24 74.3 kg (163 lb 12.8 oz)   10/02/24 85.5 kg (188 lb 9.6 oz)   09/26/24 86.2 kg (190 lb)     79-year-old female was admitted due to volume overload, weight gain, and edema secondary to acute on chronic diastolic congestive heart failure.  Diuretic management per cardiology. Diuretics on hold for now due to SADAF.   Daily weights, I's and O's  Discharge with torsemide tomorrow as long as renal function improves.

## 2024-10-18 NOTE — ASSESSMENT & PLAN NOTE
Results from last 7 days   Lab Units 10/19/24  0807 10/18/24  0448 10/17/24  0637 10/16/24  0751   CREATININE mg/dL 1.34* 1.70* 1.42* 1.54*   Baseline 1.1-1.3  Etiology suspected to be due to cardiorenal. Improved.

## 2024-10-18 NOTE — ASSESSMENT & PLAN NOTE
Wt Readings from Last 3 Encounters:   10/18/24 74.3 kg (163 lb 12.8 oz)   10/02/24 85.5 kg (188 lb 9.6 oz)   09/26/24 86.2 kg (190 lb)     79-year-old female was admitted due to volume overload, weight gain, and edema secondary to acute on chronic diastolic congestive heart failure.  Diuretic management per cardiology. Cleared by cardiology for discharge with once daily torsemide.  Discharge to rehab today   normal...

## 2024-10-18 NOTE — CASE MANAGEMENT
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     Generalized edema 2024     Constipation 2024     Leg pain, right 2024     Acute on chronic diastolic congestive heart failure (HCC) 2024     s/p [employer]onic loop recorder 3/2/2024 2024     Moderate protein-calorie malnutrition (HCC) 2024     Hypertensive urgency 2024     AMS (altered mental status) 2024     Encounter for examination for admission to nursing home 2024     Stage 3a chronic kidney disease (HCC) 01/10/2024     Left ventricular outflow obstruction 2024     Obesity, Class I, BMI 30-34.9 2024     Urinary retention 2023     SADAF (acute kidney injury) ([facility]) 2023     Exertional shortness of breath 2023     Non obstructive CAD 11/15/2023     History of lumbar surgery 11/10/2023     Abnormal urinalysis 2023     Chronic obstructive pulmonary disease, unspecified COPD type ([facility]) 2023     Confusion with body shakes and blank stare 2023     Anemia in stage 3a chronic kidney disease  (HCC) 2023     Lower extremity edema 2023     Cerebrovascular accident (CVA), unspecified mechanism ([facility]) 2022     Stroke-like symptoms 2022     Prepyloric ulcer 2021     Diarrhea 2021     Mild intermittent asthma without complication 2020     S/P insertion of spinal cord stimulator 2018     Iron deficiency anemia secondary to  inadequate dietary iron intake 06/19/2018     Type 2 diabetes mellitus with other skin complications (HCC)      Failed back surgical syndrome 03/16/2018     Hypothyroidism 11/20/2017     Degenerative lumbar spinal stenosis 01/25/2017     Glaucoma 01/06/2017     Overactive bladder 08/04/2016     Dyspepsia 02/09/2016     Hypercholesterolemia 02/09/2016     Anxiety 02/09/2015     Chronic pain disorder 12/18/2013     Hypertension 06/12/2013       LOS (days): 7  Geometric Mean LOS (GMLOS) (days): 4.4  Days to GMLOS:-2.5     OBJECTIVE:  Risk of Unplanned Readmission Score: 30.73         Current admission status: Inpatient   Preferred Pharmacy:   [facility] [place], [place] - [address]  [address]  [facility] [address]  Phone: [phone] Fax: [phone]    [facility] [place] - [address]  [facility],  [address]  [facility] [address],  First [address] [place]  [facility] [address]  Phone: [phone] Fax: [phone]    [facility] - [place], [place] - [address]  [address]  [place] [address]  Phone: [phone] Fax: [phone]    Primary Care Provider: NANY Pollock    Primary Insurance: [employer] [facility]  Secondary Insurance:     DISCHARGE DETAILS:    Number/Name of Dispatcher: [phone]  Transported by (Company and Unit #): [facility] [employer]  ETA of Transport (Date): 10/19/24  ETA of Transport (Time): 1230       Additional Comments: Pt's discharge was canceled for today.  Anticipate discharge tomorrow.  CM updated liaison with [facility] via [employer].  PT worked with Pt today. Pt ambulated 120 ft.  Pt asked if she could return to [facility].  CM called, s/w Antonella in the Wellness Office.  Antonella is still requesting STR for the pt.  CM notified the pt of this.  Pt still agreeable to [facility].  CM arranged transport via [employer] with [employer] [facility] by stretcher van for 12:30PM tomorrow.  CM updated the pt, pt's  daughter, Senior Twan Penn, RN and MD.

## 2024-10-18 NOTE — DISCHARGE SUMMARY
Discharge Summary - Hospitalist   Name: Mattie Varela 79 y.o. female I MRN: [id number]  Unit/Bed#: E5 -01 I Date of Admission: 10/11/2024   Date of Service: 10/19/2024 I Hospital Day: 8     Assessment & Plan  Acute on chronic diastolic congestive heart failure (HCC)  Wt Readings from Last 3 Encounters:   10/18/24 74.3 kg (163 lb 12.8 oz)   10/02/24 85.5 kg (188 lb 9.6 oz)   09/26/24 86.2 kg (190 lb)     79-year-old female was admitted due to volume overload, weight gain, and edema secondary to acute on chronic diastolic congestive heart failure.  Diuretic management per cardiology. Cleared by cardiology for discharge with once daily torsemide.  Discharge to rehab today  [facility] (acute kidney injury) (Formerly [facility])  Results from last 7 days   Lab Units 10/19/24  0807 10/18/24  0448 10/17/24  0637 10/16/24  0751   CREATININE mg/dL 1.34* 1.70* 1.42* 1.54*   Baseline 1.1-1.3  Etiology suspected to be due to cardiorenal. Improved.  Acute low back pain  Complaining of low back pain  No red flags.  No acute fractures and CT spine lumbar without contrast study  Continue supportive care  Degenerative lumbar spinal stenosis  Status post spinal stimulator, although daughter stated that this is no longer working.  Utilizes baclofen as needed and Percocet 10 mg 3 times daily as needed  Type 2 diabetes mellitus with other skin complications (Formerly [facility])  Lab Results   Component Value Date    HGBA1C 7.4 (H) 10/12/2024     Recent Labs     10/17/24  1051 10/17/24  1606 10/17/24  2047 10/18/24  0706   POCGLU 205* 231* 225* 166*     Continue sliding scale  Chronic pain disorder  Reviewed PDMP as well as facility paperwork  Prescribed Percocet 10-3 25 prn  Discontinue all IV medications, discussed with daughter  Constipation resolved with bowel regimen.   Chest pain  Appreciate cardiology recommendations.  No evidence of ACS at this time. Suspect GERD like symptoms. Continue PPI and Carafate for now  Hypertension  Continue metoprolol succinate 50  mg BID    Hypothyroidism  Continue Synthroid  Acute cystitis  Patient was diagnosed with acute cystitis as an outpatient was started on ciprofloxacin 7-day course through 10/15/2024  Continue renal adjusted dose through 10/15/2024  Overactive bladder  Received Botox injections in the past  Urinary retention protocol, ahn catheter was placed by urology >700 cc's drained.  Urology recommends outpatient ahn catheter voiding trial  Cerebrovascular accident (CVA), unspecified mechanism (HCC)  Continue aspirin, statin  Chronic obstructive pulmonary disease, unspecified COPD type (HCC)  Not in acute exacerbation  Continue albuterol as needed     Discharging Physician / Practitioner: Paco Carmona MD  PCP: NANY Pollock  Admission Date:   Admission Orders (From admission, onward)       Ordered        10/11/24 1646  INPATIENT ADMISSION  Once                          Discharge Date: 10/19/24    Medical Problems       Resolved Problems  Date Reviewed: 8/7/2024   None         Consultations During Hospital Stay:  Urology, nutrition, cardiology    Procedures Performed:   none    Significant Findings / Test Results:   Imaging  XR Chest:    No acute cardiopulmonary disease.     CT chest abdomen pelvis:    No evidence of consolidation or pleural effusion.     No evidence of hydronephrosis or stone.     Bladder distention with air-fluid level should be correlated with urinalysis and any recent instrumentation. Correlation with any voiding dysfunction is advised. Ahn catheter may be useful.     No small bowel dilatation.     The study is limited due to extensive streak artifacts from orthopedic hardware.    CT spine lumbar:  1. No acute fracture.  2. Extensive postoperative and spondylotic changes are similar to the prior study.    XR chest:    No acute cardiopulmonary disease.     Low lung volumes producing vascular crowding.    Incidental Findings:   As written above     Test Results Pending at Discharge (will require  follow up):   none     Outpatient Tests Requested:  Pcp, cbc, bmp  cardiology    Complications:  none    Reason for Admission: chest pain    Hospital Course:     Mattie Varela is a 79 y.o. female patient who originally presented to the hospital on 10/11/2024 due to chest pain.    Patient is 79-year-old female history of CHF, hypertension, CAD, CKD, and CVA who presented to institution due to chest pain which radiated to the right side of her chest.  Cardiology was consulted who suspected that the precordial chest pain is nonischemic in nature and not suggestive of an ACS.  She was treated as a case of acute on chronic diastolic heart failure with diuretics.  This continued to improve clinically.  Unfortunately, she seemed to develop an acute kidney injury which improved after her medication doses were adjusted.  She has since then been cleared by cardiology for discharge.    Of note, patient would continue to have occasional episodes of chest discomfort associated with eating.  According to her daughter, Carafate was tried before which resulted in some improvement.  Will trial this for the time being.  She also had constipation which was addressed after she was started on a bowel regimen    Patient seem to have developed urinary retention whether or not this is associated for recent Botox injection urology was consulted.  A Bateman catheter was placed and the recommendation was that she continues to keep it for the time being with an outpatient trial of void later on.    Physical therapy and Occupational Therapy evaluated her and recommended that she would benefit from a rehab placement.  She will be discharged to rehab today.      Please see above list of diagnoses and related plan for additional information.     Condition at Discharge: fair     Discharge Day Visit / Exam:     Subjective:  patient seen and examined at bedside. Reports no more abdominal pain, still with occasional chest discomfort.   Vitals: Blood  Pressure: (!) 98/49 (10/19/24 0713)  Pulse: 98 (10/19/24 1055)  Temperature: 98.5 °F (36.9 °C) (10/19/24 0713)  Temp Source: Oral (10/18/24 2243)  Respirations: 16 (10/19/24 1055)  Height: 5' (152.4 cm) (10/11/24 1700)  Weight - Scale: 74.3 kg (163 lb 12.8 oz) (10/18/24 0600)  SpO2: 94 % (10/19/24 1055)  Exam:   Physical Exam  Vitals reviewed.   Constitutional:       General: She is not in acute distress.  HENT:      Head: Normocephalic.      Nose: Nose normal.      Mouth/Throat:      Mouth: Mucous membranes are moist.   Eyes:      General: No scleral icterus.  Cardiovascular:      Rate and Rhythm: Normal rate.   Pulmonary:      Effort: Pulmonary effort is normal. No respiratory distress.   Abdominal:      General: There is no distension.      Palpations: Abdomen is soft.      Tenderness: There is no abdominal tenderness.   Skin:     General: Skin is warm.   Neurological:      Mental Status: She is alert.   Psychiatric:         Mood and Affect: Mood normal.         Behavior: Behavior normal.       Discussion with Family: hayley    Discharge instructions/Information to patient and family:   See after visit summary for information provided to patient and family.      Provisions for Follow-Up Care:  See after visit summary for information related to follow-up care and any pertinent home health orders.      Disposition:     rehab    Planned Readmission: No     Discharge Statement:  I spent 40 minutes discharging the patient. This time was spent on the day of discharge. I had direct contact with the patient on the day of discharge. Greater than 50% of the total time was spent examining patient, answering all patient questions, arranging and discussing plan of care with patient as well as directly providing post-discharge instructions.  Additional time then spent on discharge activities.    Discharge Medications:  See after visit summary for reconciled discharge medications provided to patient and family.      **  Please Note: This note has been constructed using a voice recognition system **

## 2024-10-18 NOTE — PLAN OF CARE
Problem: PHYSICAL THERAPY ADULT  Goal: Performs mobility at highest level of function for planned discharge setting.  See evaluation for individualized goals.  Description: Treatment/Interventions: Functional transfer training, LE strengthening/ROM, Therapeutic exercise, Endurance training, Patient/family training, Bed mobility, Spoke to nursing, Gait training, OT          See flowsheet documentation for full assessment, interventions and recommendations.  Outcome: Progressing  Note: Prognosis: Fair  Problem List: Decreased strength, Decreased range of motion, Decreased endurance, Decreased mobility, Pain  Assessment: Pt. progressing well with overall mobility. Pt. needed extended time to complete all activities. Pt. was given A for elvira hygiene post BM on BSC. No LOB or instability noted during ambulation. Cues to stay within RW and to increase her jorge length during ambulation given. Seated rest between ambulation. Pt. reported pain in the R low back area which is restricting her mobility. Will continue to follow per PT POC. Pt. is currently functioning below her baseline and needs assist for all mobility.  Barriers to Discharge: None     Rehab Resource Intensity Level, PT: II (Moderate Resource Intensity)    See flowsheet documentation for full assessment.

## 2024-10-18 NOTE — PHYSICAL THERAPY NOTE
PHYSICAL THERAPY NOTE          Patient Name: Mattie Varela  Today's Date: 10/18/2024       10/18/24 1407   PT Last Visit   PT Visit Date 10/18/24              Assessment   Assessment Discussed pt's progress in PT w/ PTA. PTA reported that pt progressing well in PT & have met initial PT goals. Please see PT progress notes 10/18/24 for details. Appropriate to update PT goals. PT goals as stated below. Will continue PT per POC & D/C rec to maximized rehab potential & to return to PLOF.   Goals   STG Expiration Date 11/01/24   Short Term Goal #1 Goals to be met in 14 days; pt will be able to: 1) inc strength & balance by 1/2 grade to improve overall functional mobility & dec fall risk; 2) inc bed mobility to S for pt to be able to get in/OOB safely w/ proper techniques 100% of the time, to dec caregiver burden & safely function at home; 3) inc transfers to S for pt to transition safely from one surface to another w/o % of the time, to dec caregiver burden & safely function at home; 4) inc amb w/ RW approx. >250' w/ S for pt to ambulate community distances w/o any % of the time, to dec caregiver burden & safely function at home; 5) pt/caregiver ed     Cosme Estrada

## 2024-10-18 NOTE — PHYSICAL THERAPY NOTE
Physical Therapy Treatment Note     10/18/24 1023   PT Last Visit   PT Visit Date 10/18/24   Note Type   Note Type Treatment   Pain Assessment   Pain Assessment Tool 0-10   Pain Score 5   Pain Location/Orientation Location: Back;Orientation: Lower;Orientation: Right   Restrictions/Precautions   Weight Bearing Precautions Per Order No   Other Precautions Pain;Fall Risk;Fluid restriction;Bed Alarm;Chair Alarm;Multiple lines  (+ ahn)   General   Chart Reviewed Yes   Family/Caregiver Present No   Subjective   Subjective Pt. agreeable to PT   Transfers   Sit to Stand 4  Minimal assistance   Additional items Assist x 1;Increased time required;Armrests   Stand to Sit 4  Minimal assistance   Additional items Assist x 1;Armrests;Increased time required   Stand pivot 4  Minimal assistance   Additional items Assist x 1;Increased time required   Toilet transfer 4  Minimal assistance   Additional items Increased time required;Commode;Verbal cues;Armrests;Assist x 1   Ambulation/Elevation   Gait pattern Decreased foot clearance;Forward Flexion;Narrow RUBIN;Foward flexed;Short stride;Excessively slow;Decreased toe off;Decreased heel strike   Gait Assistance 4  Minimal assist   Additional items Assist x 1;Verbal cues;Assist x 2;Other (Comment)  (2nd A for chair follow only)   Assistive Device Rolling walker   Distance 2ft, 120ft, 30ft   Balance   Static Sitting Good   Dynamic Sitting Fair   Static Standing Fair   Dynamic Standing Fair -   Ambulatory Fair -   Endurance Deficit   Endurance Deficit Yes   Endurance Deficit Description pain, fatigue   Activity Tolerance   Activity Tolerance Patient tolerated treatment well   Nurse Made Aware yes   Exercises   TKR Sitting;Bilateral;AROM;20 reps;10 reps   Assessment   Prognosis Fair   Problem List Decreased strength;Decreased range of motion;Decreased endurance;Decreased mobility;Pain   Assessment Pt. progressing well with overall mobility. Pt. needed extended time to complete all  activities. Pt. was given A for elvira hygiene post BM on BSC. No LOB or instability noted during ambulation. Cues to stay within RW and to increase her jorge length during ambulation given. Seated rest between ambulation. Pt. reported pain in the R low back area which is restricting her mobility. Will continue to follow per PT POC. Pt. is currently functioning below her baseline and needs assist for all mobility.   Barriers to Discharge None   Goals   Patient Goals None reported   STG Expiration Date 10/27/24   PT Treatment Day 4   Plan   Treatment/Interventions Functional transfer training;LE strengthening/ROM;Therapeutic exercise;Spoke to nursing;Gait training;Equipment eval/education;Patient/family training   Progress Progressing toward goals   PT Frequency 3-5x/wk   Discharge Recommendation   Rehab Resource Intensity Level, PT II (Moderate Resource Intensity)   Equipment Recommended Walker   AM-PAC Basic Mobility Inpatient   Turning in Flat Bed Without Bedrails 3   Lying on Back to Sitting on Edge of Flat Bed Without Bedrails 3   Moving Bed to Chair 3   Standing Up From Chair Using Arms 3   Walk in Room 3   Climb 3-5 Stairs With Railing 2   Basic Mobility Inpatient Raw Score 17   Basic Mobility Standardized Score 39.67   Turning Head Towards Sound 4   Follow Simple Instructions 4   Low Function Basic Mobility Raw Score  25   Low Function Basic Mobility Standardized Score  39.85   [facility] Highest Level Of Mobility   -HL Goal 5: Stand one or more mins   -HLM Achieved 7: Walk 25 feet or more   End of Consult   Patient Position at End of Consult All needs within reach;Bed/Chair alarm activated;Bedside chair           Heaven Harper PTA    An AM-PAC basic mobility standardized score less than 42.9 suggest the patient may benefit from discharge to post-acute rehab services.

## 2024-10-18 NOTE — PLAN OF CARE
Problem: Potential for Falls  Goal: Patient will remain free of falls  Description: INTERVENTIONS:  - Educate patient/family on patient safety including physical limitations  - Instruct patient to call for assistance with activity   - Consult OT/PT to assist with strengthening/mobility   - Keep Call bell within reach  - Keep bed low and locked with side rails adjusted as appropriate  - Keep care items and personal belongings within reach  - Initiate and maintain comfort rounds  - Make Fall Risk Sign visible to staff  - Offer Toileting every 2 Hours, in advance of need  - Initiate/Maintain bed alarm  - Apply yellow socks and bracelet for high fall risk patients  - Consider moving patient to room near nurses station  Outcome: Progressing     Problem: PAIN - ADULT  Goal: Verbalizes/displays adequate comfort level or baseline comfort level  Description: Interventions:  - Encourage patient to monitor pain and request assistance  - Assess pain using appropriate pain scale  - Administer analgesics based on type and severity of pain and evaluate response  - Implement non-pharmacological measures as appropriate and evaluate response  - Consider cultural and social influences on pain and pain management  - Notify physician/advanced practitioner if interventions unsuccessful or patient reports new pain  Outcome: Progressing     Problem: INFECTION - ADULT  Goal: Absence or prevention of progression during hospitalization  Description: INTERVENTIONS:  - Assess and monitor for signs and symptoms of infection  - Monitor lab/diagnostic results  - Monitor all insertion sites, i.e. indwelling lines, tubes, and drains  - Monitor endotracheal if appropriate and nasal secretions for changes in amount and color  - Kiel appropriate cooling/warming therapies per order  - Administer medications as ordered  - Instruct and encourage patient and family to use good hand hygiene technique  - Identify and instruct in appropriate isolation  precautions for identified infection/condition  Outcome: Progressing  Goal: Absence of fever/infection during neutropenic period  Description: INTERVENTIONS:  - Monitor WBC    Outcome: Progressing     Problem: SAFETY ADULT  Goal: Patient will remain free of falls  Description: INTERVENTIONS:  - Educate patient/family on patient safety including physical limitations  - Instruct patient to call for assistance with activity   - Consult OT/PT to assist with strengthening/mobility   - Keep Call bell within reach  - Keep bed low and locked with side rails adjusted as appropriate  - Keep care items and personal belongings within reach  - Initiate and maintain comfort rounds  - Make Fall Risk Sign visible to staff  - Offer Toileting every 2 Hours, in advance of need  - Initiate/Maintain bed alarm  - Apply yellow socks and bracelet for high fall risk patients  - Consider moving patient to room near nurses station  Outcome: Progressing  Goal: Maintain or return to baseline ADL function  Description: INTERVENTIONS:  - Educate patient/family on patient safety including physical limitations  - Instruct patient to call for assistance with activity   - Consult OT/PT to assist with strengthening/mobility   - Keep Call bell within reach  - Keep bed low and locked with side rails adjusted as appropriate  - Keep care items and personal belongings within reach  - Initiate and maintain comfort rounds  - Make Fall Risk Sign visible to staff  - Offer Toileting every 2 Hours, in advance of need  - Initiate/Maintain bed alarm  - Apply yellow socks and bracelet for high fall risk patients  - Consider moving patient to room near nurses station  Outcome: Progressing  Goal: Maintains/Returns to pre admission functional level  Description: INTERVENTIONS:  - Perform AM-PAC 6 Click Basic Mobility/ Daily Activity assessment daily.  - Set and communicate daily mobility goal to care team and patient/family/caregiver.   - Collaborate with  rehabilitation services on mobility goals if consulted  - Perform Range of Motion *** times a day.  - Reposition patient every *** hours.  - Dangle patient *** times a day  - Stand patient *** times a day  - Ambulate patient *** times a day  - Out of bed to chair *** times a day   - Out of bed for meals *** times a day  - Out of bed for toileting  - Record patient progress and toleration of activity level   Outcome: Progressing     Problem: DISCHARGE PLANNING  Goal: Discharge to home or other facility with appropriate resources  Description: INTERVENTIONS:  - Identify barriers to discharge w/patient and caregiver  - Arrange for needed discharge resources and transportation as appropriate  - Identify discharge learning needs (meds, wound care, etc.)  - Arrange for interpretive services to assist at discharge as needed  - Refer to Case Management Department for coordinating discharge planning if the patient needs post-hospital services based on physician/advanced practitioner order or complex needs related to functional status, cognitive ability, or social support system  Outcome: Progressing     Problem: Knowledge Deficit  Goal: Patient/family/caregiver demonstrates understanding of disease process, treatment plan, medications, and discharge instructions  Description: Complete learning assessment and assess knowledge base.  Interventions:  - Provide teaching at level of understanding  - Provide teaching via preferred learning methods  Outcome: Progressing     Problem: Prexisting or High Potential for Compromised Skin Integrity  Goal: Skin integrity is maintained or improved  Description: INTERVENTIONS:  - Identify patients at risk for skin breakdown  - Assess and monitor skin integrity  - Assess and monitor nutrition and hydration status  - Monitor labs   - Assess for incontinence   - Turn and reposition patient  - Assist with mobility/ambulation  - Relieve pressure over bony prominences  - Avoid friction and  shearing  - Provide appropriate hygiene as needed including keeping skin clean and dry  - Evaluate need for skin moisturizer/barrier cream  - Collaborate with interdisciplinary team   - Patient/family teaching  - Consider wound care consult   Outcome: Progressing     Problem: Nutrition/Hydration-ADULT  Goal: Nutrient/Hydration intake appropriate for improving, restoring or maintaining nutritional needs  Description: Monitor and assess patient's nutrition/hydration status for malnutrition. Collaborate with interdisciplinary team and initiate plan and interventions as ordered.  Monitor patient's weight and dietary intake as ordered or per policy. Utilize nutrition screening tool and intervene as necessary. Determine patient's food preferences and provide high-protein, high-caloric foods as appropriate.     INTERVENTIONS:  - Monitor oral intake, urinary output, labs, and treatment plans  - Assess nutrition and hydration status and recommend course of action  - Evaluate amount of meals eaten  - Assist patient with eating if necessary   - Allow adequate time for meals  - Recommend/ encourage appropriate diets, oral nutritional supplements, and vitamin/mineral supplements  - Order, calculate, and assess calorie counts as needed  - Recommend, monitor, and adjust tube feedings and TPN/PPN based on assessed needs  - Assess need for intravenous fluids  - Provide specific nutrition/hydration education as appropriate  - Include patient/family/caregiver in decisions related to nutrition  Outcome: Progressing

## 2024-10-18 NOTE — RESTORATIVE TECHNICIAN NOTE
Restorative Technician Note      Patient Name: Mattie Varela     Restorative Tech Visit Date: 10/18/24  Note Type: Mobility  Patient Position Upon Consult: Bedside chair  Activity Performed: Ambulated  Assistive Device: Roller walker  Patient Position at End of Consult: Bedside chair; All needs within reach; Bed/Chair alarm activated        ns

## 2024-10-18 NOTE — PROGRESS NOTES
Cardiology Progress Note   MD Natan Ruiz MD, [facility]  Emerson Valdes DO, [facility]  MD Sindy Goldstein DO, [facility]  Torey Farnsworth DO, [facility]  ----------------------------------------------------------------  [address] [address]    Mattie Varela 79 y.o. female MRN: [id number]  Unit/Bed#: E5 -01 Encounter: [id number]      ASSESSMENT:   Acute on chronic HFpEF  LVEF 70%, grade 1 diastolic dysfunction, elevated LVOT velocities without obstruction with PG 25 mmHg with Valsalva, moderate MAC, mild TR, May 2024  Acute kidney injury  Hypertension  Dyslipidemia  Type 2 diabetes mellitus  Hypothyroid  History of CVA  DJD    PLAN:  Patient has had mild bump in creatinine  Hold torsemide this evening and plan to reinstitute 20 mg daily tomorrow if renal function improves  Strict I's/O's and daily weights  Keep potassium greater than 4 magnesium greater than 2  Continue aspirin, high intensity statin and Toprol-XL  Would plan to monitor renal function over 24 hours on the oral torsemide and weights  If both are stable, reasonable for discharge on late Saturday versus Sunday    Signed: Emerson Valdes DO, [facility], FASE, FASNC, FACP      History of Present Illness:  Patient seen and examined.  Admits to feeling much improved overall.  Denies chest pain, pressure, tightness or squeezing.  Denies lightheadedness, dizziness or palpitations.  Denies lower extremity swelling, orthopnea or paroxysmal nocturnal dyspnea.  Out of bed to chair.  No acute events.      Review of Systems:  Review of Systems   Constitutional: Negative for decreased appetite, fever, weight gain and weight loss.   HENT:  Negative for congestion and sore throat.    Eyes:  Negative for visual disturbance.   Cardiovascular:  Negative for chest pain, dyspnea on exertion, leg swelling, near-syncope and palpitations.   Respiratory:  Negative for cough and shortness of breath.     Hematologic/Lymphatic: Negative for bleeding problem.   Skin:  Negative for rash.   Musculoskeletal:  Negative for myalgias and neck pain.   Gastrointestinal:  Negative for abdominal pain and nausea.   Neurological:  Negative for light-headedness and weakness.   Psychiatric/Behavioral:  Negative for depression.        Allergies   Allergen Reactions    Penicillins Anaphylaxis, Hives and Other (See Comments)     Other reaction(s): Unknown Reaction    Sulfa Antibiotics Anaphylaxis and Other (See Comments)     Other reaction(s): Unknown Reaction    Aspartame - Food Allergy Other (See Comments) and Hypertension     Slurred speech, weakness, stroke sx    Iodinated Contrast Media Hives     Pt has taken prep prior for contrast and has not had any break through reaction    Keflex [Cephalexin] Hives       No current facility-administered medications on file prior to encounter.     Current Outpatient Medications on File Prior to Encounter   Medication Sig    albuterol (PROVENTIL HFA,VENTOLIN HFA) 90 mcg/act inhaler Inhale 2 puffs every 6 (six) hours as needed for wheezing or shortness of breath    aspirin 81 mg chewable tablet Chew 81 mg daily    atorvastatin (LIPITOR) 40 mg tablet TAKE ONE TABLET BY MOUTH ONCE DAILY (Patient taking differently: Take 40 mg by mouth daily)    baclofen 10 mg tablet Take 10 mg by mouth 2 (two) times a day as needed for muscle spasms    docusate sodium (COLACE) 100 mg capsule Take 1 capsule (100 mg total) by mouth 2 (two) times a day (Patient taking differently: Take 100 mg by mouth 2 (two) times a day as needed)    ferrous sulfate 325 (65 Fe) mg tablet Take 1 tablet (325 mg total) by mouth daily with breakfast    latanoprost (XALATAN) 0.005 % ophthalmic solution Administer 1 drop to both eyes daily at bedtime    levothyroxine 75 mcg tablet Take 0.5 tablets (37.5 mcg total) by mouth daily in the early morning    MAGNESIUM OXIDE 400 PO Take 400 mg by mouth 2 (two) times a day    meclizine  (ANTIVERT) 25 mg tablet Take 1 tablet (25 mg total) by mouth 4 (four) times a day as needed for dizziness (Patient taking differently: Take 25 mg by mouth daily as needed for dizziness)    metFORMIN (GLUCOPHAGE) 1000 MG tablet Take 1 tablet (1,000 mg total) by mouth 2 (two) times a day with meals (Patient taking differently: Take 500 mg by mouth 2 (two) times a day with meals)    metoprolol succinate (TOPROL-XL) 50 mg 24 hr tablet Take 1 tablet (50 mg total) by mouth every 12 (twelve) hours    Milnacipran HCl (Savella) 100 MG TABS Take 1 tablet (100 mg total) by mouth daily    oxyCODONE-acetaminophen (PERCOCET)  mg per tablet Take 1 tablet by mouth every 6 (six) hours as needed    pantoprazole (PROTONIX) 40 mg tablet Take 1 tablet (40 mg total) by mouth daily    polyethylene glycol (MIRALAX) 17 g packet Take 17 g by mouth daily    senna (SENOKOT) 8.6 mg Take 2 tablets (17.2 mg total) by mouth daily at bedtime    torsemide (DEMADEX) 20 mg tablet Take 20 mg by mouth daily    Blood Glucose Monitoring Suppl (OneTouch Verio Reflect) w/Device KIT Check blood sugars twice daily. Please substitute with appropriate alternative as covered by patient's insurance. Dx: E11.65    glucose blood (OneTouch Verio) test strip Check blood sugars twice daily. Please substitute with appropriate alternative as covered by patient's insurance. Dx: E11.65    OneTouch Delica Lancets 33G MISC Check blood sugars twice daily. Please substitute with appropriate alternative as covered by patient's insurance. Dx: E11.65    [DISCONTINUED] sucralfate (CARAFATE) 1 g tablet Take 1 tablet (1 g total) by mouth 4 (four) times a day        Current Facility-Administered Medications   Medication Dose Route Frequency Provider Last Rate    acetaminophen  975 mg Oral Q8H ARY Paco Carmona MD      albuterol  2 puff Inhalation Q6H PRN Alexys Angel,       aluminum-magnesium hydroxide-simethicone  30 mL Oral Q6H PRN Alexys Angel DO      aspirin   81 mg Oral Daily Alexys Angel, DO      atorvastatin  40 mg Oral Daily Alexys Angel, DO      baclofen  10 mg Oral BID PRN Alexys Angel DO      diphenhydrAMINE  25 mg Oral Q6H PRN Bi Luke PA-C      ferrous sulfate  325 mg Oral Daily With Dinner Alexys Angel DO      heparin (porcine)  5,000 Units Subcutaneous Q8H [facility] Alexys Angel, DO      hydrocortisone   Topical 4x Daily PRN Paco Carmona MD      insulin lispro  1-5 Units Subcutaneous 4x Daily (AC & HS) Alexys Angel DO      latanoprost  1 drop Both Eyes HS Alexys Angel DO      levothyroxine  37.5 mcg Oral Early Morning Alexys Angel DO      lidocaine  1 patch Topical Daily Susannah Mcclain PA-C      magnesium Oxide  400 mg Oral BID Alexys Angel, DO      meclizine  25 mg Oral Daily PRN Alexys Angel, DO      melatonin  6 mg Oral HS Royer Carballo, DO      menthol-methyl salicylate   Apply externally 4x Daily PRN Bi Luke PA-C      metoprolol succinate  50 mg Oral Q12H [facility] Alexys Angel DO      ondansetron  4 mg Intravenous Q6H PRN Alexys Angel DO      oxyCODONE  10 mg Oral Q6H PRN Paco Carmona MD      pantoprazole  40 mg Oral Daily Before Breakfast Alexys Angel DO      polyethylene glycol  17 g Oral Daily Alexys Angel DO      senna-docusate sodium  1 tablet Oral BID Susannah Mcclain PA-C      zolpidem  5 mg Oral HS PRN Susannah Mcclain PA-C              Vitals:    10/17/24 2026 10/17/24 2114 10/18/24 0600 10/18/24 0705   BP:  117/77  127/58   BP Location:    Right arm   Pulse:  97  86   Resp:  18  15   Temp:  98.1 °F (36.7 °C)  (!) 97.2 °F (36.2 °C)   TempSrc:  Oral  Oral   SpO2: 93% 95%  95%   Weight:   74.3 kg (163 lb 12.8 oz)    Height:         Body mass index is 31.99 kg/m².      Intake/Output Summary (Last 24 hours) at 10/18/2024 0848  Last data filed at 10/18/2024 0601  Gross per 24 hour   Intake 540 ml   Output 1450 ml   Net -910 ml       Weight change: -3.1 kg (-6 lb  "13.4 oz)    PHYSICAL EXAMINATION:  Gen: Awake, Alert, NAD  Head/eyes: AT/NC, pupils equal and round, Anicteric  ENT: mmm  Neck: Supple, No elevated JVP, trachea midline  Resp: CTA bilaterally no w/r/r  CV: RRR +S1, S2, No m/r/g  Abd: Soft, obese, NT/ND + BS  Ext: no LE edema bilaterally  Neuro: Follows commands, moves all extermities  Psych: Appropriate affect, pleasant mood, pleasant attitude, non-combative  Skin: warm; no rash, erythema or venous stasis changes on exposed skin    Lab Results:  Results from last 7 days   Lab Units 10/18/24  0448   WBC Thousand/uL 8.60   HEMOGLOBIN g/dL 11.5   HEMATOCRIT % 35.8   PLATELETS Thousands/uL 319     Results from last 7 days   Lab Units 10/18/24  0448 10/12/24  0535 10/11/24  1228   POTASSIUM mmol/L 3.7   < > 5.0   CHLORIDE mmol/L 88*   < > 99   CO2 mmol/L 35*   < > 30   BUN mg/dL 42*   < > 29*   CREATININE mg/dL 1.70*   < > 1.65*   CALCIUM mg/dL 10.2   < > 9.4   ALK PHOS U/L  --   --  123*   ALT U/L  --   --  7   AST U/L  --   --  17    < > = values in this interval not displayed.     No results found for: \"TROPONINT\"      Results from last 7 days   Lab Units 10/11/24  1455 10/11/24  1228   HS TNI 0HR ng/L  --  10   HS TNI 2HR ng/L 8  --              This note was completed in part utilizing M-Modal Fluency Direct Software.  Grammatical errors, random word insertions, spelling mistakes, and incomplete sentences may be an occasional consequence of this system secondary to software limitations, ambient noise, and hardware issues.  If you have any questions or concerns about the content, text, or information contained within the body of this dictation, please contact the provider for clarification.      "

## 2024-10-18 NOTE — ASSESSMENT & PLAN NOTE
Appreciate cardiology recommendations.  No evidence of ACS at this time. Suspect GERD like symptoms. Continue PPI and Carafate for now

## 2024-10-18 NOTE — ASSESSMENT & PLAN NOTE
Results from last 7 days   Lab Units 10/18/24  0448 10/17/24  0637 10/16/24  0751 10/15/24  0449   CREATININE mg/dL 1.70* 1.42* 1.54* 1.50*   Baseline 1.1-1.3.  Etiology suspected to be due to cardiorenal. Stable.

## 2024-10-18 NOTE — ASSESSMENT & PLAN NOTE
Lab Results   Component Value Date    HGBA1C 7.4 (H) 10/12/2024     Recent Labs     10/17/24  1606 10/17/24  2047 10/18/24  0706 10/18/24  1101   POCGLU 231* 225* 166* 258*   Continue sliding scale

## 2024-10-18 NOTE — ASSESSMENT & PLAN NOTE
Lab Results   Component Value Date    EGFR 27 10/18/2024    EGFR 34 10/17/2024    EGFR 31 10/16/2024    CREATININE 1.70 (H) 10/18/2024    CREATININE 1.42 (H) 10/17/2024    CREATININE 1.54 (H) 10/16/2024     Possibly due to overdiuresis, hold torsemide today. Monitor overnight.

## 2024-10-18 NOTE — PLAN OF CARE
Problem: Potential for Falls  Goal: Patient will remain free of falls  Description: INTERVENTIONS:  - Educate patient/family on patient safety including physical limitations  - Instruct patient to call for assistance with activity   - Consult OT/PT to assist with strengthening/mobility   - Keep Call bell within reach  - Keep bed low and locked with side rails adjusted as appropriate  - Keep care items and personal belongings within reach  - Initiate and maintain comfort rounds  - Make Fall Risk Sign visible to staff  - Offer Toileting every 2 Hours, in advance of need  - Initiate/Maintain bed alarm  - Apply yellow socks and bracelet for high fall risk patients  - Consider moving patient to room near nurses station  Outcome: Progressing     Problem: PAIN - ADULT  Goal: Verbalizes/displays adequate comfort level or baseline comfort level  Description: Interventions:  - Encourage patient to monitor pain and request assistance  - Assess pain using appropriate pain scale  - Administer analgesics based on type and severity of pain and evaluate response  - Implement non-pharmacological measures as appropriate and evaluate response  - Consider cultural and social influences on pain and pain management  - Notify physician/advanced practitioner if interventions unsuccessful or patient reports new pain  Outcome: Progressing     Problem: INFECTION - ADULT  Goal: Absence or prevention of progression during hospitalization  Description: INTERVENTIONS:  - Assess and monitor for signs and symptoms of infection  - Monitor lab/diagnostic results  - Monitor all insertion sites, i.e. indwelling lines, tubes, and drains  - Monitor endotracheal if appropriate and nasal secretions for changes in amount and color  - [place]ad appropriate cooling/warming therapies per order  - Administer medications as ordered  - Instruct and encourage patient and family to use good hand hygiene technique  - Identify and instruct in appropriate isolation  precautions for identified infection/condition  Outcome: Progressing  Goal: Absence of fever/infection during neutropenic period  Description: INTERVENTIONS:  - Monitor WBC    Outcome: Progressing     Problem: SAFETY ADULT  Goal: Patient will remain free of falls  Description: INTERVENTIONS:  - Educate patient/family on patient safety including physical limitations  - Instruct patient to call for assistance with activity   - Consult OT/PT to assist with strengthening/mobility   - Keep Call bell within reach  - Keep bed low and locked with side rails adjusted as appropriate  - Keep care items and personal belongings within reach  - Initiate and maintain comfort rounds  - Make Fall Risk Sign visible to staff  - Offer Toileting every 2 Hours, in advance of need  - Initiate/Maintain bed alarm  - Apply yellow socks and bracelet for high fall risk patients  - Consider moving patient to room near nurses station  Outcome: Progressing  Goal: Maintain or return to baseline ADL function  Description: INTERVENTIONS:  - Educate patient/family on patient safety including physical limitations  - Instruct patient to call for assistance with activity   - Consult OT/PT to assist with strengthening/mobility   - Keep Call bell within reach  - Keep bed low and locked with side rails adjusted as appropriate  - Keep care items and personal belongings within reach  - Initiate and maintain comfort rounds  - Make Fall Risk Sign visible to staff  - Offer Toileting every 2 Hours, in advance of need  - Initiate/Maintain bed alarm  - Apply yellow socks and bracelet for high fall risk patients  - Consider moving patient to room near nurses station  Outcome: Progressing  Goal: Maintains/Returns to pre admission functional level  Description: INTERVENTIONS:  - Perform AM-PAC 6 Click Basic Mobility/ Daily Activity assessment daily.  - Set and communicate daily mobility goal to care team and patient/family/caregiver.   - Collaborate with  rehabilitation services on mobility goals if consulted  - Out of bed for toileting  - Record patient progress and toleration of activity level   Outcome: Progressing     Problem: DISCHARGE PLANNING  Goal: Discharge to home or other facility with appropriate resources  Description: INTERVENTIONS:  - Identify barriers to discharge w/patient and caregiver  - Arrange for needed discharge resources and transportation as appropriate  - Identify discharge learning needs (meds, wound care, etc.)  - Arrange for interpretive services to assist at discharge as needed  - Refer to Case Management Department for coordinating discharge planning if the patient needs post-hospital services based on physician/advanced practitioner order or complex needs related to functional status, cognitive ability, or social support system  Outcome: Progressing     Problem: Knowledge Deficit  Goal: Patient/family/caregiver demonstrates understanding of disease process, treatment plan, medications, and discharge instructions  Description: Complete learning assessment and assess knowledge base.  Interventions:  - Provide teaching at level of understanding  - Provide teaching via preferred learning methods  Outcome: Progressing     Problem: Prexisting or High Potential for Compromised Skin Integrity  Goal: Skin integrity is maintained or improved  Description: INTERVENTIONS:  - Identify patients at risk for skin breakdown  - Assess and monitor skin integrity  - Assess and monitor nutrition and hydration status  - Monitor labs   - Assess for incontinence   - Turn and reposition patient  - Assist with mobility/ambulation  - Relieve pressure over bony prominences  - Avoid friction and shearing  - Provide appropriate hygiene as needed including keeping skin clean and dry  - Evaluate need for skin moisturizer/barrier cream  - Collaborate with interdisciplinary team   - Patient/family teaching  - Consider wound care consult   Outcome: Progressing      Problem: Nutrition/Hydration-ADULT  Goal: Nutrient/Hydration intake appropriate for improving, restoring or maintaining nutritional needs  Description: Monitor and assess patient's nutrition/hydration status for malnutrition. Collaborate with interdisciplinary team and initiate plan and interventions as ordered.  Monitor patient's weight and dietary intake as ordered or per policy. Utilize nutrition screening tool and intervene as necessary. Determine patient's food preferences and provide high-protein, high-caloric foods as appropriate.     INTERVENTIONS:  - Monitor oral intake, urinary output, labs, and treatment plans  - Assess nutrition and hydration status and recommend course of action  - Evaluate amount of meals eaten  - Assist patient with eating if necessary   - Allow adequate time for meals  - Recommend/ encourage appropriate diets, oral nutritional supplements, and vitamin/mineral supplements  - Order, calculate, and assess calorie counts as needed  - Recommend, monitor, and adjust tube feedings and TPN/PPN based on assessed needs  - Assess need for intravenous fluids  - Provide specific nutrition/hydration education as appropriate  - Include patient/family/caregiver in decisions related to nutrition  Outcome: Progressing

## 2024-10-19 VITALS
HEIGHT: 60 IN | OXYGEN SATURATION: 94 % | WEIGHT: 163.8 LBS | SYSTOLIC BLOOD PRESSURE: 106 MMHG | BODY MASS INDEX: 32.16 KG/M2 | HEART RATE: 90 BPM | RESPIRATION RATE: 16 BRPM | DIASTOLIC BLOOD PRESSURE: 52 MMHG | TEMPERATURE: 98.9 F

## 2024-10-19 LAB
ANION GAP SERPL CALCULATED.3IONS-SCNC: 7 MMOL/L (ref 4–13)
BUN SERPL-MCNC: 35 MG/DL (ref 5–25)
CALCIUM SERPL-MCNC: 9.7 MG/DL (ref 8.4–10.2)
CHLORIDE SERPL-SCNC: 90 MMOL/L (ref 96–108)
CO2 SERPL-SCNC: 37 MMOL/L (ref 21–32)
CREAT SERPL-MCNC: 1.34 MG/DL (ref 0.6–1.3)
GFR SERPL CREATININE-BSD FRML MDRD: 37 ML/MIN/1.73SQ M
GLUCOSE SERPL-MCNC: 149 MG/DL (ref 65–140)
GLUCOSE SERPL-MCNC: 188 MG/DL (ref 65–140)
GLUCOSE SERPL-MCNC: 268 MG/DL (ref 65–140)
POTASSIUM SERPL-SCNC: 3.7 MMOL/L (ref 3.5–5.3)
SODIUM SERPL-SCNC: 134 MMOL/L (ref 135–147)

## 2024-10-19 PROCEDURE — 99232 SBSQ HOSP IP/OBS MODERATE 35: CPT | Performed by: INTERNAL MEDICINE

## 2024-10-19 PROCEDURE — 80048 BASIC METABOLIC PNL TOTAL CA: CPT

## 2024-10-19 PROCEDURE — 82948 REAGENT STRIP/BLOOD GLUCOSE: CPT

## 2024-10-19 PROCEDURE — 99239 HOSP IP/OBS DSCHRG MGMT >30: CPT | Performed by: STUDENT IN AN ORGANIZED HEALTH CARE EDUCATION/TRAINING PROGRAM

## 2024-10-19 RX ORDER — MAGNESIUM HYDROXIDE/ALUMINUM HYDROXICE/SIMETHICONE 120; 1200; 1200 MG/30ML; MG/30ML; MG/30ML
30 SUSPENSION ORAL EVERY 6 HOURS PRN
Status: ON HOLD
Start: 2024-10-19

## 2024-10-19 RX ORDER — ZOLPIDEM TARTRATE 5 MG/1
5 TABLET ORAL
Qty: 3 TABLET | Refills: 0 | Status: ON HOLD | OUTPATIENT
Start: 2024-10-19

## 2024-10-19 RX ORDER — LIDOCAINE 50 MG/G
1 PATCH TOPICAL DAILY
Status: ON HOLD
Start: 2024-10-20

## 2024-10-19 RX ORDER — OXYCODONE AND ACETAMINOPHEN 10; 325 MG/1; MG/1
1 TABLET ORAL EVERY 6 HOURS PRN
Qty: 12 TABLET | Refills: 0 | Status: SHIPPED | OUTPATIENT
Start: 2024-10-19 | End: 2024-10-22

## 2024-10-19 RX ORDER — METHOCARBAMOL 500 MG/1
500 TABLET, FILM COATED ORAL ONCE
Status: COMPLETED | OUTPATIENT
Start: 2024-10-19 | End: 2024-10-19

## 2024-10-19 RX ORDER — SUCRALFATE 1 G/1
1 TABLET ORAL
Status: DISCONTINUED | OUTPATIENT
Start: 2024-10-19 | End: 2024-10-19 | Stop reason: HOSPADM

## 2024-10-19 RX ORDER — ACETAMINOPHEN 325 MG/1
975 TABLET ORAL EVERY 8 HOURS SCHEDULED
Status: ON HOLD
Start: 2024-10-19

## 2024-10-19 RX ORDER — SUCRALFATE 1 G/1
1 TABLET ORAL
Qty: 14 TABLET | Refills: 0 | Status: ON HOLD
Start: 2024-10-19 | End: 2024-10-26

## 2024-10-19 RX ORDER — TORSEMIDE 20 MG/1
20 TABLET ORAL DAILY
Status: DISCONTINUED | OUTPATIENT
Start: 2024-10-19 | End: 2024-10-19 | Stop reason: HOSPADM

## 2024-10-19 RX ADMIN — SENNOSIDES AND DOCUSATE SODIUM 1 TABLET: 8.6; 5 TABLET ORAL at 09:21

## 2024-10-19 RX ADMIN — INSULIN LISPRO 2 UNITS: 100 INJECTION, SOLUTION INTRAVENOUS; SUBCUTANEOUS at 11:05

## 2024-10-19 RX ADMIN — HEPARIN SODIUM 5000 UNITS: 5000 INJECTION INTRAVENOUS; SUBCUTANEOUS at 05:25

## 2024-10-19 RX ADMIN — Medication 400 MG: at 09:21

## 2024-10-19 RX ADMIN — MENTHOL 10%, METHYL SALICYLATE 15%: 10; 15 CREAM TOPICAL at 06:44

## 2024-10-19 RX ADMIN — ASPIRIN 81 MG CHEWABLE TABLET 81 MG: 81 TABLET CHEWABLE at 09:21

## 2024-10-19 RX ADMIN — METOPROLOL SUCCINATE 50 MG: 50 TABLET, EXTENDED RELEASE ORAL at 09:21

## 2024-10-19 RX ADMIN — LEVOTHYROXINE SODIUM 37.5 MCG: 75 TABLET ORAL at 05:24

## 2024-10-19 RX ADMIN — SUCRALFATE 1 G: 1 TABLET ORAL at 11:05

## 2024-10-19 RX ADMIN — LIDOCAINE 1 PATCH: 700 PATCH TOPICAL at 09:21

## 2024-10-19 RX ADMIN — PANTOPRAZOLE SODIUM 40 MG: 40 TABLET, DELAYED RELEASE ORAL at 05:28

## 2024-10-19 RX ADMIN — TORSEMIDE 20 MG: 20 TABLET ORAL at 11:05

## 2024-10-19 RX ADMIN — METHOCARBAMOL 500 MG: 500 TABLET ORAL at 11:05

## 2024-10-19 RX ADMIN — OXYCODONE HYDROCHLORIDE 10 MG: 10 TABLET ORAL at 01:20

## 2024-10-19 RX ADMIN — ATORVASTATIN CALCIUM 40 MG: 40 TABLET, FILM COATED ORAL at 09:21

## 2024-10-19 RX ADMIN — OXYCODONE HYDROCHLORIDE 10 MG: 10 TABLET ORAL at 09:21

## 2024-10-19 RX ADMIN — ACETAMINOPHEN 975 MG: 325 TABLET, FILM COATED ORAL at 05:24

## 2024-10-19 RX ADMIN — BACLOFEN 10 MG: 10 TABLET ORAL at 05:24

## 2024-10-19 NOTE — PLAN OF CARE
Problem: Potential for Falls  Goal: Patient will remain free of falls  Description: INTERVENTIONS:  - Educate patient/family on patient safety including physical limitations  - Instruct patient to call for assistance with activity   - Consult OT/PT to assist with strengthening/mobility   - Keep Call bell within reach  - Keep bed low and locked with side rails adjusted as appropriate  - Keep care items and personal belongings within reach  - Initiate and maintain comfort rounds  - Make Fall Risk Sign visible to staff  - Offer Toileting every 2 Hours, in advance of need  - Initiate/Maintain bed alarm  - Apply yellow socks and bracelet for high fall risk patients  - Consider moving patient to room near nurses station  Outcome: Progressing     Problem: PAIN - ADULT  Goal: Verbalizes/displays adequate comfort level or baseline comfort level  Description: Interventions:  - Encourage patient to monitor pain and request assistance  - Assess pain using appropriate pain scale  - Administer analgesics based on type and severity of pain and evaluate response  - Implement non-pharmacological measures as appropriate and evaluate response  - Consider cultural and social influences on pain and pain management  - Notify physician/advanced practitioner if interventions unsuccessful or patient reports new pain  Outcome: Progressing     Problem: INFECTION - ADULT  Goal: Absence or prevention of progression during hospitalization  Description: INTERVENTIONS:  - Assess and monitor for signs and symptoms of infection  - Monitor lab/diagnostic results  - Monitor all insertion sites, i.e. indwelling lines, tubes, and drains  - Monitor endotracheal if appropriate and nasal secretions for changes in amount and color  - [place] appropriate cooling/warming therapies per order  - Administer medications as ordered  - Instruct and encourage patient and family to use good hand hygiene technique  - Identify and instruct in appropriate isolation  precautions for identified infection/condition  Outcome: Progressing  Goal: Absence of fever/infection during neutropenic period  Description: INTERVENTIONS:  - Monitor WBC    Outcome: Progressing     Problem: SAFETY ADULT  Goal: Patient will remain free of falls  Description: INTERVENTIONS:  - Educate patient/family on patient safety including physical limitations  - Instruct patient to call for assistance with activity   - Consult OT/PT to assist with strengthening/mobility   - Keep Call bell within reach  - Keep bed low and locked with side rails adjusted as appropriate  - Keep care items and personal belongings within reach  - Initiate and maintain comfort rounds  - Make Fall Risk Sign visible to staff  - Offer Toileting every 2 Hours, in advance of need  - Initiate/Maintain bed alarm  - Apply yellow socks and bracelet for high fall risk patients  - Consider moving patient to room near nurses station  Outcome: Progressing  Goal: Maintain or return to baseline ADL function  Description: INTERVENTIONS:  - Educate patient/family on patient safety including physical limitations  - Instruct patient to call for assistance with activity   - Consult OT/PT to assist with strengthening/mobility   - Keep Call bell within reach  - Keep bed low and locked with side rails adjusted as appropriate  - Keep care items and personal belongings within reach  - Initiate and maintain comfort rounds  - Make Fall Risk Sign visible to staff  - Offer Toileting every 2 Hours, in advance of need  - Initiate/Maintain bed alarm  - Apply yellow socks and bracelet for high fall risk patients  - Consider moving patient to room near nurses station  Outcome: Progressing  Goal: Maintains/Returns to pre admission functional level  Description: INTERVENTIONS:  - Perform AM-PAC 6 Click Basic Mobility/ Daily Activity assessment daily.  - Set and communicate daily mobility goal to care team and patient/family/caregiver.   - Collaborate with  rehabilitation services on mobility goals if consulted  - Perform Range of Motion 3 times a day.  - Reposition patient every 2 hours.  - Dangle patient 3 times a day  - Stand patient 3 times a day  - Ambulate patient 3 times a day  - Out of bed to chair 3 times a day   - Out of bed for meals 3 times a day  - Out of bed for toileting  - Record patient progress and toleration of activity level   Outcome: Progressing     Problem: DISCHARGE PLANNING  Goal: Discharge to home or other facility with appropriate resources  Description: INTERVENTIONS:  - Identify barriers to discharge w/patient and caregiver  - Arrange for needed discharge resources and transportation as appropriate  - Identify discharge learning needs (meds, wound care, etc.)  - Arrange for interpretive services to assist at discharge as needed  - Refer to Case Management Department for coordinating discharge planning if the patient needs post-hospital services based on physician/advanced practitioner order or complex needs related to functional status, cognitive ability, or social support system  Outcome: Progressing     Problem: Knowledge Deficit  Goal: Patient/family/caregiver demonstrates understanding of disease process, treatment plan, medications, and discharge instructions  Description: Complete learning assessment and assess knowledge base.  Interventions:  - Provide teaching at level of understanding  - Provide teaching via preferred learning methods  Outcome: Progressing     Problem: Prexisting or High Potential for Compromised Skin Integrity  Goal: Skin integrity is maintained or improved  Description: INTERVENTIONS:  - Identify patients at risk for skin breakdown  - Assess and monitor skin integrity  - Assess and monitor nutrition and hydration status  - Monitor labs   - Assess for incontinence   - Turn and reposition patient  - Assist with mobility/ambulation  - Relieve pressure over bony prominences  - Avoid friction and shearing  - Provide  appropriate hygiene as needed including keeping skin clean and dry  - Evaluate need for skin moisturizer/barrier cream  - Collaborate with interdisciplinary team   - Patient/family teaching  - Consider wound care consult   Outcome: Progressing     Problem: Nutrition/Hydration-ADULT  Goal: Nutrient/Hydration intake appropriate for improving, restoring or maintaining nutritional needs  Description: Monitor and assess patient's nutrition/hydration status for malnutrition. Collaborate with interdisciplinary team and initiate plan and interventions as ordered.  Monitor patient's weight and dietary intake as ordered or per policy. Utilize nutrition screening tool and intervene as necessary. Determine patient's food preferences and provide high-protein, high-caloric foods as appropriate.     INTERVENTIONS:  - Monitor oral intake, urinary output, labs, and treatment plans  - Assess nutrition and hydration status and recommend course of action  - Evaluate amount of meals eaten  - Assist patient with eating if necessary   - Allow adequate time for meals  - Recommend/ encourage appropriate diets, oral nutritional supplements, and vitamin/mineral supplements  - Order, calculate, and assess calorie counts as needed  - Recommend, monitor, and adjust tube feedings and TPN/PPN based on assessed needs  - Assess need for intravenous fluids  - Provide specific nutrition/hydration education as appropriate  - Include patient/family/caregiver in decisions related to nutrition  Outcome: Progressing

## 2024-10-19 NOTE — PLAN OF CARE
Problem: Potential for Falls  Goal: Patient will remain free of falls  Description: INTERVENTIONS:  - Educate patient/family on patient safety including physical limitations  - Instruct patient to call for assistance with activity   - Consult OT/PT to assist with strengthening/mobility   - Keep Call bell within reach  - Keep bed low and locked with side rails adjusted as appropriate  - Keep care items and personal belongings within reach  - Initiate and maintain comfort rounds  - Make Fall Risk Sign visible to staff  - Offer Toileting every 2 Hours, in advance of need  - Initiate/Maintain bed alarm  - Apply yellow socks and bracelet for high fall risk patients  - Consider moving patient to room near nurses station  Outcome: Progressing     Problem: PAIN - ADULT  Goal: Verbalizes/displays adequate comfort level or baseline comfort level  Description: Interventions:  - Encourage patient to monitor pain and request assistance  - Assess pain using appropriate pain scale  - Administer analgesics based on type and severity of pain and evaluate response  - Implement non-pharmacological measures as appropriate and evaluate response  - Consider cultural and social influences on pain and pain management  - Notify physician/advanced practitioner if interventions unsuccessful or patient reports new pain  Outcome: Progressing     Problem: INFECTION - ADULT  Goal: Absence or prevention of progression during hospitalization  Description: INTERVENTIONS:  - Assess and monitor for signs and symptoms of infection  - Monitor lab/diagnostic results  - Monitor all insertion sites, i.e. indwelling lines, tubes, and drains  - Monitor endotracheal if appropriate and nasal secretions for changes in amount and color  - [place] appropriate cooling/warming therapies per order  - Administer medications as ordered  - Instruct and encourage patient and family to use good hand hygiene technique  - Identify and instruct in appropriate isolation  precautions for identified infection/condition  Outcome: Progressing  Goal: Absence of fever/infection during neutropenic period  Description: INTERVENTIONS:  - Monitor WBC    Outcome: Progressing     Problem: SAFETY ADULT  Goal: Patient will remain free of falls  Description: INTERVENTIONS:  - Educate patient/family on patient safety including physical limitations  - Instruct patient to call for assistance with activity   - Consult OT/PT to assist with strengthening/mobility   - Keep Call bell within reach  - Keep bed low and locked with side rails adjusted as appropriate  - Keep care items and personal belongings within reach  - Initiate and maintain comfort rounds  - Make Fall Risk Sign visible to staff  - Offer Toileting every 2 Hours, in advance of need  - Initiate/Maintain bed alarm  - Apply yellow socks and bracelet for high fall risk patients  - Consider moving patient to room near nurses station  Outcome: Progressing  Goal: Maintain or return to baseline ADL function  Description: INTERVENTIONS:  - Educate patient/family on patient safety including physical limitations  - Instruct patient to call for assistance with activity   - Consult OT/PT to assist with strengthening/mobility   - Keep Call bell within reach  - Keep bed low and locked with side rails adjusted as appropriate  - Keep care items and personal belongings within reach  - Initiate and maintain comfort rounds  - Make Fall Risk Sign visible to staff  - Offer Toileting every 2 Hours, in advance of need  - Initiate/Maintain bed alarm  - Apply yellow socks and bracelet for high fall risk patients  - Consider moving patient to room near nurses station  Outcome: Progressing  Goal: Maintains/Returns to pre admission functional level  Description: INTERVENTIONS:  - Perform AM-PAC 6 Click Basic Mobility/ Daily Activity assessment daily.  - Set and communicate daily mobility goal to care team and patient/family/caregiver.   - Collaborate with  rehabilitation services on mobility goals if consulted  - Out of bed for toileting  - Record patient progress and toleration of activity level   Outcome: Progressing     Problem: DISCHARGE PLANNING  Goal: Discharge to home or other facility with appropriate resources  Description: INTERVENTIONS:  - Identify barriers to discharge w/patient and caregiver  - Arrange for needed discharge resources and transportation as appropriate  - Identify discharge learning needs (meds, wound care, etc.)  - Arrange for interpretive services to assist at discharge as needed  - Refer to Case Management Department for coordinating discharge planning if the patient needs post-hospital services based on physician/advanced practitioner order or complex needs related to functional status, cognitive ability, or social support system  Outcome: Progressing     Problem: Knowledge Deficit  Goal: Patient/family/caregiver demonstrates understanding of disease process, treatment plan, medications, and discharge instructions  Description: Complete learning assessment and assess knowledge base.  Interventions:  - Provide teaching at level of understanding  - Provide teaching via preferred learning methods  Outcome: Progressing     Problem: Prexisting or High Potential for Compromised Skin Integrity  Goal: Skin integrity is maintained or improved  Description: INTERVENTIONS:  - Identify patients at risk for skin breakdown  - Assess and monitor skin integrity  - Assess and monitor nutrition and hydration status  - Monitor labs   - Assess for incontinence   - Turn and reposition patient  - Assist with mobility/ambulation  - Relieve pressure over bony prominences  - Avoid friction and shearing  - Provide appropriate hygiene as needed including keeping skin clean and dry  - Evaluate need for skin moisturizer/barrier cream  - Collaborate with interdisciplinary team   - Patient/family teaching  - Consider wound care consult   Outcome: Progressing      Problem: Nutrition/Hydration-ADULT  Goal: Nutrient/Hydration intake appropriate for improving, restoring or maintaining nutritional needs  Description: Monitor and assess patient's nutrition/hydration status for malnutrition. Collaborate with interdisciplinary team and initiate plan and interventions as ordered.  Monitor patient's weight and dietary intake as ordered or per policy. Utilize nutrition screening tool and intervene as necessary. Determine patient's food preferences and provide high-protein, high-caloric foods as appropriate.     INTERVENTIONS:  - Monitor oral intake, urinary output, labs, and treatment plans  - Assess nutrition and hydration status and recommend course of action  - Evaluate amount of meals eaten  - Assist patient with eating if necessary   - Allow adequate time for meals  - Recommend/ encourage appropriate diets, oral nutritional supplements, and vitamin/mineral supplements  - Order, calculate, and assess calorie counts as needed  - Recommend, monitor, and adjust tube feedings and TPN/PPN based on assessed needs  - Assess need for intravenous fluids  - Provide specific nutrition/hydration education as appropriate  - Include patient/family/caregiver in decisions related to nutrition  Outcome: Progressing

## 2024-10-19 NOTE — PROGRESS NOTES
Cardiology Progress Note   MD Natan Ruiz MD, FACC  Emerson Valdes DO, [facility]  MD Sindy Goldstein DO, [facility]  Torey Farnsworth DO, [facility]  ----------------------------------------------------------------  [address] [address]    Mattie Varela 79 y.o. female MRN: [id number]  Unit/Bed#: E5 -01 Encounter: [id number]      ASSESSMENT:   Acute on chronic HFpEF  LVEF 70%, grade 1 diastolic dysfunction, elevated LVOT velocities without obstruction with PG 25 mmHg with Valsalva, moderate MAC, mild TR, May 2024  Acute kidney injury  Hypertension  Dyslipidemia  Type 2 diabetes mellitus  Hypothyroid  History of CVA  DJD    PLAN:  Patient has had no chest pain concerning for angina  Cardiac enzymes have been negative over multiple sets and ECG is unchanged; doubt ACS  Renal function is now improved  Can reinitiate home dose of torsemide 20 mg daily  Strict I's/O's and daily weights  Keep potassium greater than 4 magnesium greater than 2  Continue aspirin, high intensity statin and Toprol-XL  Reasonable for discharge today from CV perspective  Should she gain greater than 3 pounds in a day or 5 pounds in 1 to 2 weeks, recommend calling the office for further titration of diuretic medication  Will see further inpatient as needed; please reconsult with questions  Outpatient BMP within 1 week of discharge to reassess renal function    Signed: Emerson Valdes DO, [facility], LANCE MAY, FACP      History of Present Illness:  Patient seen and examined.  Denies chest pain, pressure, tightness or squeezing.  Denies lightheadedness, dizziness or palpitations.  Denies lower extremity swelling, orthopnea or paroxysmal nocturnal dyspnea.  Admits to chronic back pain.      Review of Systems:  Review of Systems   Constitutional: Negative for decreased appetite, fever, weight gain and weight loss.   HENT:  Negative for congestion and sore throat.    Eyes:  Negative  for visual disturbance.   Cardiovascular:  Negative for chest pain, dyspnea on exertion, leg swelling, near-syncope and palpitations.   Respiratory:  Negative for cough and shortness of breath.    Hematologic/Lymphatic: Negative for bleeding problem.   Skin:  Negative for rash.   Musculoskeletal:  Positive for back pain. Negative for myalgias and neck pain.   Gastrointestinal:  Negative for abdominal pain and nausea.   Neurological:  Negative for light-headedness and weakness.   Psychiatric/Behavioral:  Negative for depression.        Allergies   Allergen Reactions    Penicillins Anaphylaxis, Hives and Other (See Comments)     Other reaction(s): Unknown Reaction    Sulfa Antibiotics Anaphylaxis and Other (See Comments)     Other reaction(s): Unknown Reaction    Aspartame - Food Allergy Other (See Comments) and Hypertension     Slurred speech, weakness, stroke sx    Iodinated Contrast Media Hives     Pt has taken prep prior for contrast and has not had any break through reaction    Keflex [Cephalexin] Hives       No current facility-administered medications on file prior to encounter.     Current Outpatient Medications on File Prior to Encounter   Medication Sig    albuterol (PROVENTIL HFA,VENTOLIN HFA) 90 mcg/act inhaler Inhale 2 puffs every 6 (six) hours as needed for wheezing or shortness of breath    aspirin 81 mg chewable tablet Chew 81 mg daily    atorvastatin (LIPITOR) 40 mg tablet TAKE ONE TABLET BY MOUTH ONCE DAILY (Patient taking differently: Take 40 mg by mouth daily)    baclofen 10 mg tablet Take 10 mg by mouth 2 (two) times a day as needed for muscle spasms    docusate sodium (COLACE) 100 mg capsule Take 1 capsule (100 mg total) by mouth 2 (two) times a day (Patient taking differently: Take 100 mg by mouth 2 (two) times a day as needed)    ferrous sulfate 325 (65 Fe) mg tablet Take 1 tablet (325 mg total) by mouth daily with breakfast    latanoprost (XALATAN) 0.005 % ophthalmic solution Administer 1 drop  to both eyes daily at bedtime    levothyroxine 75 mcg tablet Take 0.5 tablets (37.5 mcg total) by mouth daily in the early morning    MAGNESIUM OXIDE 400 PO Take 400 mg by mouth 2 (two) times a day    meclizine (ANTIVERT) 25 mg tablet Take 1 tablet (25 mg total) by mouth 4 (four) times a day as needed for dizziness (Patient taking differently: Take 25 mg by mouth daily as needed for dizziness)    metFORMIN (GLUCOPHAGE) 1000 MG tablet Take 1 tablet (1,000 mg total) by mouth 2 (two) times a day with meals (Patient taking differently: Take 500 mg by mouth 2 (two) times a day with meals)    metoprolol succinate (TOPROL-XL) 50 mg 24 hr tablet Take 1 tablet (50 mg total) by mouth every 12 (twelve) hours    Milnacipran HCl (Savella) 100 MG TABS Take 1 tablet (100 mg total) by mouth daily    oxyCODONE-acetaminophen (PERCOCET)  mg per tablet Take 1 tablet by mouth every 6 (six) hours as needed    pantoprazole (PROTONIX) 40 mg tablet Take 1 tablet (40 mg total) by mouth daily    polyethylene glycol (MIRALAX) 17 g packet Take 17 g by mouth daily    senna (SENOKOT) 8.6 mg Take 2 tablets (17.2 mg total) by mouth daily at bedtime    torsemide (DEMADEX) 20 mg tablet Take 20 mg by mouth daily    Blood Glucose Monitoring Suppl (OneTouch Verio Reflect) w/Device KIT Check blood sugars twice daily. Please substitute with appropriate alternative as covered by patient's insurance. Dx: E11.65    glucose blood (OneTouch Verio) test strip Check blood sugars twice daily. Please substitute with appropriate alternative as covered by patient's insurance. Dx: E11.65    OneTouch Delica Lancets 33G MISC Check blood sugars twice daily. Please substitute with appropriate alternative as covered by patient's insurance. Dx: E11.65    [DISCONTINUED] sucralfate (CARAFATE) 1 g tablet Take 1 tablet (1 g total) by mouth 4 (four) times a day        Current Facility-Administered Medications   Medication Dose Route Frequency Provider Last Rate     acetaminophen  975 mg Oral Q8H [facility] Paco Carmona MD      albuterol  2 puff Inhalation Q6H PRN Alexys Angel, DO      aluminum-magnesium hydroxide-simethicone  30 mL Oral Q6H PRN Alexys Angel, DO      aspirin  81 mg Oral Daily Alexys Angel, DO      atorvastatin  40 mg Oral Daily Alexys Angel, DO      baclofen  10 mg Oral BID PRN Alexys Angel, DO      diphenhydrAMINE  25 mg Oral Q6H PRN Bi Luke PA-C      ferrous sulfate  325 mg Oral Daily With Dinner Alexys Angel DO      heparin (porcine)  5,000 Units Subcutaneous Q8H [facility] Alexys Angel, DO      hydrocortisone   Topical 4x Daily PRN Paco Carmona MD      insulin lispro  1-5 Units Subcutaneous 4x Daily (AC & HS) Alexys Angel DO      latanoprost  1 drop Both Eyes HS Alexys Angel DO      levothyroxine  37.5 mcg Oral Early Morning Alexys Angel, DO      lidocaine  1 patch Topical Daily Susannah Mcclain PA-C      magnesium Oxide  400 mg Oral BID Alexys Angel, DO      meclizine  25 mg Oral Daily PRN Alexys Angel, DO      melatonin  6 mg Oral HS Royer Carballo, DO      menthol-methyl salicylate   Apply externally 4x Daily PRN Bi Luke PA-C      metoprolol succinate  50 mg Oral Q12H [facility] Alexys Angel, DO      ondansetron  4 mg Intravenous Q6H PRN Alexys Angel, DO      oxyCODONE  10 mg Oral Q6H PRN Paco Carmona MD      pantoprazole  40 mg Oral Daily Before Breakfast Alexys Angel,       polyethylene glycol  17 g Oral Daily Alexys Angel DO      senna-docusate sodium  1 tablet Oral BID Susannah Mcclain PA-C      zolpidem  5 mg Oral HS PRN Susannah Mcclain PA-C              Vitals:    10/18/24 2114 10/18/24 2243 10/19/24 0700 10/19/24 0713   BP: 128/62 135/85  (!) 98/49   BP Location:  Right arm     Pulse: 101 94 80    Resp:  16  18   Temp:  98.8 °F (37.1 °C) 98.5 °F (36.9 °C) 98.5 °F (36.9 °C)   TempSrc:  Oral     SpO2: 99% 98% 96%    Weight:       Height:         Body mass index is  "31.99 kg/m².      Intake/Output Summary (Last 24 hours) at 10/19/2024 0731  Last data filed at 10/18/2024 2243  Gross per 24 hour   Intake 540 ml   Output 1200 ml   Net -660 ml       Weight change:     PHYSICAL EXAMINATION:  Gen: Awake, Alert, NAD  Head/eyes: AT/NC, pupils equal and round, Anicteric  ENT: mmm  Neck: Supple, No elevated JVP, trachea midline  Resp: CTA bilaterally no w/r/r  CV: RRR +S1, S2, No m/r/g  Abd: Soft, obese, NT/ND + BS  Ext: no LE edema bilaterally  Neuro: Follows commands, moves all extermities  Psych: Appropriate affect, happy mood, cooperative attitude, non-combative  Skin: warm; no rash, erythema or venous stasis changes on exposed skin    Lab Results:  Results from last 7 days   Lab Units 10/18/24  0448   WBC Thousand/uL 8.60   HEMOGLOBIN g/dL 11.5   HEMATOCRIT % 35.8   PLATELETS Thousands/uL 319     Results from last 7 days   Lab Units 10/18/24  0448   POTASSIUM mmol/L 3.7   CHLORIDE mmol/L 88*   CO2 mmol/L 35*   BUN mg/dL 42*   CREATININE mg/dL 1.70*   CALCIUM mg/dL 10.2     No results found for: \"TROPONINT\"      Results from last 7 days   Lab Units 10/18/24  2146 10/18/24  1942 10/18/24  1032   HS TNI 0HR ng/L  --  33  --    HS TNI 2HR ng/L 39  --   --    HS TNI RAND ng/L  --   --  24*             This note was completed in part utilizing M-Modal Fluency Direct Software.  Grammatical errors, random word insertions, spelling mistakes, and incomplete sentences may be an occasional consequence of this system secondary to software limitations, ambient noise, and hardware issues.  If you have any questions or concerns about the content, text, or information contained within the body of this dictation, please contact the provider for clarification.      "

## 2024-10-19 NOTE — NURSING NOTE
Report called and given to receiving nurse at facility. Discharge instructions and prescriptions given to transport team to be given to receiving facility. Patient left with all belongings.

## 2024-10-19 NOTE — NURSING NOTE
PCA went to patient's room to do blood work on patient for her last troponin due to patient's constant complaints of chest pain. Patient refused to let PCA draw blood from her. PCA informed patient's primary nurse. This nurse went to patient's room to educate patient on the necessity of getting the blood work done, and offered to patient that this nurse will collect the blood instead of the PCA, patient still refused, and said that she is no longer in pain, to leave her alone. MD was made aware of the patient's refusal, patient is stable, Aox4. This nurse will continue to monitor the patient.

## 2024-10-21 ENCOUNTER — TELEPHONE (OUTPATIENT)
Dept: CARDIOLOGY CLINIC | Facility: CLINIC | Age: 79
End: 2024-10-21

## 2024-10-21 ENCOUNTER — TRANSITIONAL CARE MANAGEMENT (OUTPATIENT)
Dept: FAMILY MEDICINE CLINIC | Facility: CLINIC | Age: 79
End: 2024-10-21

## 2024-10-21 NOTE — TELEPHONE ENCOUNTER
Called [facility] and they state they are able to do the TOV in rehab there. They request orders to be sent to [phone]. Will send orders. Will also send to clerical to schedule non urgent follow up

## 2024-10-21 NOTE — TELEPHONE ENCOUNTER
Spoke with Amelia Ascencio at [facility], advised patient will need daily weights, call if 3 lb weight gain overnight or 5 lbs in 1 week. Patient should be on fluid restriction 2 L and 2 Gm sodium diet. BMP will need to be completed day 3 and day 9.  Reviewed patient is receiving torsemide 20 mg daily, ASA 81 mg daily, atorvastatin 40 mg daily and metoprolol 50 mg every 12 hours.

## 2024-10-21 NOTE — TELEPHONE ENCOUNTER
Attempted to call [facility] to see if they are able perform TOV. Was sent to a . Message left requesting call back     When they call back please ask if they are able to perform TOV in their facility. TOV needs to be this week. If they can please ask for a fax number orders can be sent to

## 2024-10-21 NOTE — UTILIZATION REVIEW
NOTIFICATION OF ADMISSION DISCHARGE   This is a Notification of Discharge from [facility]. Please be advised that this patient has been discharge from our facility. Below you will find the admission and discharge date and time including the patient’s disposition.   UTILIZATION REVIEW CONTACT:  Lorraine Carter MA  Utilization   Network Utilization Review Department  Phone: [phone] x carefully listen to the prompts. All voicemails are confidential.  Email: [email or web address]     ADMISSION INFORMATION  PRESENTATION DATE: 10/11/2024 12:09 PM  OBERVATION ADMISSION DATE: N/A  INPATIENT ADMISSION DATE: 10/11/24  4:46 PM   DISCHARGE DATE: 10/19/2024 12:52 PM   DISPOSITION:Non [facility] SNF/TCU/SNU    Network Utilization Review Department  ATTENTION: Please call with any questions or concerns to [phone] and carefully listen to the prompts so that you are directed to the right person. All voicemails are confidential.   For Discharge needs, contact Care Management DC Support Team at [phone] opt. 2  Send all requests for admission clinical reviews, approved or denied determinations and any other requests to dedicated fax number below belonging to the campus where the patient is receiving treatment. List of dedicated fax numbers for the Facilities:  FACILITY NAME UR FAX NUMBER   ADMISSION DENIALS (Administrative/Medical Necessity) [phone]   DISCHARGE SUPPORT TEAM ([facility]) [phone]   PARENT CHILD HEALTH (Maternity/NICU/Pediatrics) [phone]   [facility] [phone]   [facility] [phone]   [facility] [phone]   [facility] [phone]   [facility] [phone]   [facility] [phone]   [facility] [phone]   [facility]  [place] [phone]   [facility] [phone]   [facility] [phone]   [facility] [phone]   [facility] [phone]

## 2024-10-21 NOTE — TELEPHONE ENCOUNTER
Ahn removal and void trial orders:  Ahn to be removed in the morning on 10/23/24 between 8am and 9am.   Patient should be encouraged to hydrate well with water and to try and void normally.   In the afternoon between 2pm and 3pm, nurse should come back to patient and do PVR. If residual is less than 250ml, ahn can remain out. If residual is 250mL or greater 16 f ahn to be replaced.  Please call the office to report results, [phone].

## 2024-10-23 NOTE — TELEPHONE ENCOUNTER
Called [employer] as that is who schedules for patient but received schedulers voicemail and was not able to leave a message as mailbox was full.

## 2024-10-24 NOTE — TELEPHONE ENCOUNTER
Spoke with nurse at rehab. They said ahn wasn't removed yesterday as recommended due to hematuria and they ended up running a UA/UC. They did however remove it this morning. Clinical will call back tomorrow to see how the TOV resulted     Clerical please refer to my earlier routing comment. Unsure why the TOV was rescheduled in the office for next week but this needs to be canceled as the rehab is able to perform the TOV.     Clinical please call rehab tomorrow to find results of TOV today

## 2024-10-25 ENCOUNTER — HOSPITAL ENCOUNTER (INPATIENT)
Facility: HOSPITAL | Age: 79
LOS: 5 days | Discharge: NURSING, REHAB OR LONG-TERM CARE | DRG: 699 | End: 2024-10-30
Attending: EMERGENCY MEDICINE | Admitting: INTERNAL MEDICINE
Payer: MEDICARE

## 2024-10-25 DIAGNOSIS — N30.90 CYSTITIS: Primary | ICD-10-CM

## 2024-10-25 DIAGNOSIS — I50.32 CHRONIC HEART FAILURE WITH PRESERVED EJECTION FRACTION (HFPEF) (HCC): ICD-10-CM

## 2024-10-25 DIAGNOSIS — G62.9 NEUROPATHY: ICD-10-CM

## 2024-10-25 DIAGNOSIS — G47.00 INSOMNIA: ICD-10-CM

## 2024-10-25 PROBLEM — N39.0 URINARY TRACT INFECTION ASSOCIATED WITH INDWELLING URETHRAL CATHETER  (HCC): Status: ACTIVE | Noted: 2024-10-25

## 2024-10-25 PROBLEM — T83.511A URINARY TRACT INFECTION ASSOCIATED WITH INDWELLING URETHRAL CATHETER  (HCC): Status: ACTIVE | Noted: 2024-10-25

## 2024-10-25 PROBLEM — B96.1 KLEBSIELLA CYSTITIS: Status: ACTIVE | Noted: 2024-10-25

## 2024-10-25 LAB
ANION GAP SERPL CALCULATED.3IONS-SCNC: 9 MMOL/L (ref 4–13)
BASOPHILS # BLD AUTO: 0.04 THOUSANDS/ΜL (ref 0–0.1)
BASOPHILS NFR BLD AUTO: 1 % (ref 0–1)
BUN SERPL-MCNC: 36 MG/DL (ref 5–25)
CALCIUM SERPL-MCNC: 9.5 MG/DL (ref 8.4–10.2)
CHLORIDE SERPL-SCNC: 94 MMOL/L (ref 96–108)
CO2 SERPL-SCNC: 32 MMOL/L (ref 21–32)
CREAT SERPL-MCNC: 1.37 MG/DL (ref 0.6–1.3)
EOSINOPHIL # BLD AUTO: 0.66 THOUSAND/ΜL (ref 0–0.61)
EOSINOPHIL NFR BLD AUTO: 8 % (ref 0–6)
ERYTHROCYTE [DISTWIDTH] IN BLOOD BY AUTOMATED COUNT: 13.7 % (ref 11.6–15.1)
GFR SERPL CREATININE-BSD FRML MDRD: 36 ML/MIN/1.73SQ M
GLUCOSE SERPL-MCNC: 156 MG/DL (ref 65–140)
GLUCOSE SERPL-MCNC: 191 MG/DL (ref 65–140)
HCT VFR BLD AUTO: 35.1 % (ref 34.8–46.1)
HGB BLD-MCNC: 11 G/DL (ref 11.5–15.4)
IMM GRANULOCYTES # BLD AUTO: 0.07 THOUSAND/UL (ref 0–0.2)
IMM GRANULOCYTES NFR BLD AUTO: 1 % (ref 0–2)
LYMPHOCYTES # BLD AUTO: 1.77 THOUSANDS/ΜL (ref 0.6–4.47)
LYMPHOCYTES NFR BLD AUTO: 21 % (ref 14–44)
MCH RBC QN AUTO: 28.6 PG (ref 26.8–34.3)
MCHC RBC AUTO-ENTMCNC: 31.3 G/DL (ref 31.4–37.4)
MCV RBC AUTO: 91 FL (ref 82–98)
MONOCYTES # BLD AUTO: 0.66 THOUSAND/ΜL (ref 0.17–1.22)
MONOCYTES NFR BLD AUTO: 8 % (ref 4–12)
NEUTROPHILS # BLD AUTO: 5.17 THOUSANDS/ΜL (ref 1.85–7.62)
NEUTS SEG NFR BLD AUTO: 61 % (ref 43–75)
NRBC BLD AUTO-RTO: 0 /100 WBCS
PLATELET # BLD AUTO: 327 THOUSANDS/UL (ref 149–390)
PMV BLD AUTO: 10.2 FL (ref 8.9–12.7)
POTASSIUM SERPL-SCNC: 4 MMOL/L (ref 3.5–5.3)
RBC # BLD AUTO: 3.84 MILLION/UL (ref 3.81–5.12)
SODIUM SERPL-SCNC: 135 MMOL/L (ref 135–147)
WBC # BLD AUTO: 8.37 THOUSAND/UL (ref 4.31–10.16)

## 2024-10-25 PROCEDURE — 85025 COMPLETE CBC W/AUTO DIFF WBC: CPT

## 2024-10-25 PROCEDURE — 87086 URINE CULTURE/COLONY COUNT: CPT

## 2024-10-25 PROCEDURE — 87186 SC STD MICRODIL/AGAR DIL: CPT

## 2024-10-25 PROCEDURE — 87077 CULTURE AEROBIC IDENTIFY: CPT

## 2024-10-25 PROCEDURE — 99223 1ST HOSP IP/OBS HIGH 75: CPT | Performed by: INTERNAL MEDICINE

## 2024-10-25 PROCEDURE — 99284 EMERGENCY DEPT VISIT MOD MDM: CPT

## 2024-10-25 PROCEDURE — 80048 BASIC METABOLIC PNL TOTAL CA: CPT

## 2024-10-25 PROCEDURE — 99285 EMERGENCY DEPT VISIT HI MDM: CPT | Performed by: EMERGENCY MEDICINE

## 2024-10-25 PROCEDURE — 82948 REAGENT STRIP/BLOOD GLUCOSE: CPT

## 2024-10-25 PROCEDURE — 36415 COLL VENOUS BLD VENIPUNCTURE: CPT

## 2024-10-25 RX ORDER — SUCRALFATE 1 G/1
1 TABLET ORAL
Status: DISCONTINUED | OUTPATIENT
Start: 2024-10-26 | End: 2024-10-30 | Stop reason: HOSPADM

## 2024-10-25 RX ORDER — BACLOFEN 10 MG/1
10 TABLET ORAL 2 TIMES DAILY PRN
Status: DISCONTINUED | OUTPATIENT
Start: 2024-10-25 | End: 2024-10-27

## 2024-10-25 RX ORDER — ONDANSETRON 2 MG/ML
4 INJECTION INTRAMUSCULAR; INTRAVENOUS EVERY 6 HOURS PRN
Status: DISCONTINUED | OUTPATIENT
Start: 2024-10-25 | End: 2024-10-30 | Stop reason: HOSPADM

## 2024-10-25 RX ORDER — PANTOPRAZOLE SODIUM 40 MG/1
40 TABLET, DELAYED RELEASE ORAL DAILY
Status: DISCONTINUED | OUTPATIENT
Start: 2024-10-26 | End: 2024-10-30 | Stop reason: HOSPADM

## 2024-10-25 RX ORDER — INSULIN LISPRO 100 [IU]/ML
1-6 INJECTION, SOLUTION INTRAVENOUS; SUBCUTANEOUS
Status: DISCONTINUED | OUTPATIENT
Start: 2024-10-26 | End: 2024-10-30 | Stop reason: HOSPADM

## 2024-10-25 RX ORDER — TEMAZEPAM 15 MG/1
15 CAPSULE ORAL
Status: DISCONTINUED | OUTPATIENT
Start: 2024-10-25 | End: 2024-10-30 | Stop reason: HOSPADM

## 2024-10-25 RX ORDER — LIDOCAINE 50 MG/G
1 PATCH TOPICAL DAILY
Status: DISCONTINUED | OUTPATIENT
Start: 2024-10-26 | End: 2024-10-30 | Stop reason: HOSPADM

## 2024-10-25 RX ORDER — ASPIRIN 81 MG/1
81 TABLET, CHEWABLE ORAL DAILY
Status: DISCONTINUED | OUTPATIENT
Start: 2024-10-26 | End: 2024-10-30 | Stop reason: HOSPADM

## 2024-10-25 RX ORDER — OXYCODONE HYDROCHLORIDE 10 MG/1
10 TABLET ORAL EVERY 6 HOURS PRN
Status: DISCONTINUED | OUTPATIENT
Start: 2024-10-25 | End: 2024-10-30 | Stop reason: HOSPADM

## 2024-10-25 RX ORDER — INSULIN LISPRO 100 [IU]/ML
1-5 INJECTION, SOLUTION INTRAVENOUS; SUBCUTANEOUS
Status: DISCONTINUED | OUTPATIENT
Start: 2024-10-25 | End: 2024-10-30 | Stop reason: HOSPADM

## 2024-10-25 RX ORDER — MAGNESIUM HYDROXIDE/ALUMINUM HYDROXICE/SIMETHICONE 120; 1200; 1200 MG/30ML; MG/30ML; MG/30ML
30 SUSPENSION ORAL EVERY 6 HOURS PRN
Status: DISCONTINUED | OUTPATIENT
Start: 2024-10-25 | End: 2024-10-30 | Stop reason: HOSPADM

## 2024-10-25 RX ORDER — SENNOSIDES 8.6 MG
17.2 TABLET ORAL
Status: DISCONTINUED | OUTPATIENT
Start: 2024-10-25 | End: 2024-10-30 | Stop reason: HOSPADM

## 2024-10-25 RX ORDER — HEPARIN SODIUM 5000 [USP'U]/ML
5000 INJECTION, SOLUTION INTRAVENOUS; SUBCUTANEOUS EVERY 8 HOURS SCHEDULED
Status: DISCONTINUED | OUTPATIENT
Start: 2024-10-25 | End: 2024-10-30 | Stop reason: HOSPADM

## 2024-10-25 RX ORDER — LEVOTHYROXINE SODIUM 75 UG/1
37.5 TABLET ORAL
Status: DISCONTINUED | OUTPATIENT
Start: 2024-10-26 | End: 2024-10-30 | Stop reason: HOSPADM

## 2024-10-25 RX ORDER — ALBUTEROL SULFATE 90 UG/1
2 INHALANT RESPIRATORY (INHALATION) EVERY 6 HOURS PRN
Status: DISCONTINUED | OUTPATIENT
Start: 2024-10-25 | End: 2024-10-30 | Stop reason: HOSPADM

## 2024-10-25 RX ORDER — ACETAMINOPHEN 325 MG/1
975 TABLET ORAL EVERY 8 HOURS SCHEDULED
Status: DISCONTINUED | OUTPATIENT
Start: 2024-10-25 | End: 2024-10-30 | Stop reason: HOSPADM

## 2024-10-25 RX ORDER — DOCUSATE SODIUM 100 MG/1
100 CAPSULE, LIQUID FILLED ORAL 2 TIMES DAILY
Status: DISCONTINUED | OUTPATIENT
Start: 2024-10-25 | End: 2024-10-30 | Stop reason: HOSPADM

## 2024-10-25 RX ORDER — ATORVASTATIN CALCIUM 40 MG/1
40 TABLET, FILM COATED ORAL DAILY
Status: DISCONTINUED | OUTPATIENT
Start: 2024-10-26 | End: 2024-10-30 | Stop reason: HOSPADM

## 2024-10-25 RX ORDER — METOPROLOL SUCCINATE 50 MG/1
50 TABLET, EXTENDED RELEASE ORAL EVERY 12 HOURS SCHEDULED
Status: DISCONTINUED | OUTPATIENT
Start: 2024-10-25 | End: 2024-10-30 | Stop reason: HOSPADM

## 2024-10-25 RX ORDER — LATANOPROST 50 UG/ML
1 SOLUTION/ DROPS OPHTHALMIC
Status: DISCONTINUED | OUTPATIENT
Start: 2024-10-25 | End: 2024-10-30 | Stop reason: HOSPADM

## 2024-10-25 RX ORDER — OXYCODONE HYDROCHLORIDE 5 MG/1
5 TABLET ORAL EVERY 6 HOURS PRN
Status: DISCONTINUED | OUTPATIENT
Start: 2024-10-25 | End: 2024-10-30 | Stop reason: HOSPADM

## 2024-10-25 RX ADMIN — TEMAZEPAM 15 MG: 15 CAPSULE ORAL at 23:26

## 2024-10-25 RX ADMIN — HEPARIN SODIUM 5000 UNITS: 5000 INJECTION INTRAVENOUS; SUBCUTANEOUS at 21:51

## 2024-10-25 RX ADMIN — ERTAPENEM SODIUM 1000 MG: 1 INJECTION, POWDER, LYOPHILIZED, FOR SOLUTION INTRAMUSCULAR; INTRAVENOUS at 20:35

## 2024-10-25 RX ADMIN — DOCUSATE SODIUM 100 MG: 100 CAPSULE, LIQUID FILLED ORAL at 21:46

## 2024-10-25 RX ADMIN — INSULIN LISPRO 1 UNITS: 100 INJECTION, SOLUTION INTRAVENOUS; SUBCUTANEOUS at 21:53

## 2024-10-25 RX ADMIN — OXYCODONE HYDROCHLORIDE 10 MG: 10 TABLET ORAL at 23:25

## 2024-10-25 RX ADMIN — METOPROLOL SUCCINATE 50 MG: 50 TABLET, EXTENDED RELEASE ORAL at 21:48

## 2024-10-25 RX ADMIN — ACETAMINOPHEN 975 MG: 325 TABLET, FILM COATED ORAL at 21:48

## 2024-10-25 NOTE — ED PROVIDER NOTES
Time reflects when diagnosis was documented in both MDM as applicable and the Disposition within this note       Time User Action Codes Description Comment    10/25/2024  8:34 PM SussexAdry Add [N30.90] Cystitis     10/25/2024  8:52 PM Toño Kyle Add [N30.90,  B96.1] Klebsiella cystitis     10/25/2024  8:53 PM Toño Kyle Remove [N30.90,  B96.1] Klebsiella cystitis           ED Disposition       ED Disposition   Admit    Condition   Stable    Date/Time   Fri Oct 25, 2024  8:34 PM    Comment   Case was discussed with Dr. Kyle and the patient's admission status was agreed to be Admission Status: inpatient status to the service of Dr. Kyle .               Assessment & Plan       Medical Decision Making  Patient is an 79-year-old female who presents today for evaluation of abnormal outpatient labs suggesting cystitis.  Initial vitals are stable patient is in no acute distress.  Physical exam remarkable for soft, nondistended abdomen with mild tenderness to palpation in the suprapubic area.  Lungs are clear to auscultation bilaterally.  She has chronic venous stasis changes in bilateral lower extremities.  Patient had outpatient urinary analysis which revealed culture positive for Klebsiella only susceptible to meropenem and ertapenem.  Patient has allergies to penicillin and Keflex.  Patient requires admission for IV antibiotics due to resistance to oral antibiotics.  Will give patient dose of ertapenem in the emergency department.  Patient admitted to [facility] for cystitis requiring IV antibiotics.    Amount and/or Complexity of Data Reviewed  Labs: ordered. Decision-making details documented in ED Course.    Risk  Decision regarding hospitalization.        ED Course as of 10/26/24 1659   Fri Oct 25, 2024   2035 Admitted to [facility] for cystitis requiring IV antibiotics.   2037 WBC: 8.37  No leukocytosis.       Medications   acetaminophen (TYLENOL) tablet 975 mg (975 mg Oral Given 10/25/24 4873)    albuterol (PROVENTIL HFA,VENTOLIN HFA) inhaler 2 puff (has no administration in time range)   aluminum-magnesium hydroxide-simethicone (MAALOX) oral suspension 30 mL (has no administration in time range)   aspirin chewable tablet 81 mg (has no administration in time range)   atorvastatin (LIPITOR) tablet 40 mg (has no administration in time range)   baclofen tablet 10 mg (has no administration in time range)   docusate sodium (COLACE) capsule 100 mg (100 mg Oral Given 10/25/24 2146)   latanoprost (XALATAN) 0.005 % ophthalmic solution 1 drop (has no administration in time range)   levothyroxine tablet 37.5 mcg (has no administration in time range)   lidocaine (LIDODERM) 5 % patch 1 patch (has no administration in time range)   metoprolol succinate (TOPROL-XL) 24 hr tablet 50 mg (50 mg Oral Given 10/25/24 2148)   pantoprazole (PROTONIX) EC tablet 40 mg (has no administration in time range)   Milnacipran HCl TABS 100 mg (has no administration in time range)   sucralfate (CARAFATE) tablet 1 g (has no administration in time range)   senna (SENOKOT) tablet 17.2 mg (17.2 mg Oral Not Given 10/25/24 2147)   torsemide (DEMADEX) tablet 30 mg (has no administration in time range)   temazepam (RESTORIL) capsule 15 mg (has no administration in time range)   oxyCODONE (ROXICODONE) IR tablet 5 mg (has no administration in time range)     Or   oxyCODONE (ROXICODONE) immediate release tablet 10 mg (has no administration in time range)   ondansetron (ZOFRAN) injection 4 mg (has no administration in time range)   heparin (porcine) subcutaneous injection 5,000 Units (5,000 Units Subcutaneous Given 10/25/24 2151)   insulin lispro (HumALOG/ADMELOG) 100 units/mL subcutaneous injection 1-6 Units (has no administration in time range)   insulin lispro (HumALOG/ADMELOG) 100 units/mL subcutaneous injection 1-5 Units (1 Units Subcutaneous Given 10/25/24 2153)   ertapenem (INVanz) 1,000 mg in sodium chloride 0.9 % 50 mL IVPB (0 mg Intravenous  Stopped 10/25/24 2139)       ED Risk Strat Scores                                               History of Present Illness       Chief Complaint   Patient presents with    Abnormal Lab     Sent from [facility] for abnormal labs that were drawn on 10/23. Pt reports having repeated UTI's which the nursing home didn't have proper antibiotics to treat. Pt reports having a ahn removed two days ago due to blood in her urine. Pt also reports burning when she pees and worsening back spasms.        Past Medical History:   Diagnosis Date    Acid reflux     Acute kidney injury (HCC)     Anemia     hx of iron-deficient    Anxiety     Arthritis     Asthma     last needed inhaler last year 2020    Basal cell carcinoma     upper lip    Chronic narcotic dependence (HCC)     Chronic pain     Colon polyp     Cystocele     Diabetes mellitus (HCC)     stable    Disease of thyroid gland     hypothyroidism    Diverticulosis     Dizziness     at times    Dysfunctional uterine bleeding     last assessed - [id number]    Dysphagia     Fibromyalgia     Gastric ulcer     Gastroparesis     History of colonic polyps     last assessed - [id number]    History of gastroesophageal reflux (GERD)     Hypercholesterolemia     Hyperlipidemia     Hypertension     Hyponatremia     IBS (irritable bowel syndrome)     Pneumobilia 06/18/2022    Post laminectomy syndrome     S/P insertion of spinal cord stimulator 07/18/2018    s/p Medtronic loop recorder 3/2/2024 03/02/2024    Seasonal allergies     Shortness of breath     exertional    Spinal stenosis     Status post lumbar spinal fusion 03/16/2018    Stroke (HCC)     pt states slight stroke March 2022      Past Surgical History:   Procedure Laterality Date    APPENDECTOMY      BACK SURGERY      BREAST CYST EXCISION Left     CARDIAC CATHETERIZATION  11/14/2023    Procedure: Cardiac catheterization;  Surgeon: Randolph Holt MD;  [facility];  Service: Cardiology    CARDIAC  CATHETERIZATION N/A 11/14/2023    Procedure: Cardiac Coronary Angiogram;  Surgeon: Randolph Holt MD;  [facility];  Service: Cardiology    CARDIAC ELECTROPHYSIOLOGY PROCEDURE N/A 3/2/2024    Procedure: Cardiac loop recorder implant;  Surgeon: Edu Fontaine MD;  [facility]: [facility];  Service: Cardiology    CHOLECYSTECTOMY      COLONOSCOPY      ESOPHAGOGASTRODUODENOSCOPY N/A 09/28/2016    Procedure: ESOPHAGOGASTRODUODENOSCOPY (EGD);  Surgeon: Mylene Moeller MD;  Location: [facility];  Service:     HERNIA REPAIR      HYSTERECTOMY      TTAH-BSO age 28    LAMINECTOMY      LUMBAR LAMINECTOMY      OOPHORECTOMY      age 28    CT ARTHRODESIS POSTERIOR/PSTLAT TQ 1NTRSPC THORACIC N/A 06/04/2018    Procedure: Reopening of lumbar incision for T12-L5 posterior instrumented fixation and fusion and T12-L4 posterior decompression;  Surgeon: Wood Rogel MD;  Location: [facility];  Service: Neurosurgery    CT COLONOSCOPY FLX DX W/COLLJ SPEC WHEN PFRMD N/A 03/02/2016    Procedure: EGD AND COLONOSCOPY;  Surgeon: Mylene Moeller MD;  [facility]: [facility];  Service: Gastroenterology    CT DILATION ESOPH UNGUIDED SOUND/BOUGIE 1/MULT PASS N/A 09/28/2016    Procedure: DILATATION ESOPHAGEAL;  Surgeon: Mylene Moeller MD;  [facility]: [facility];  Service: Gastroenterology    CT ESOPHAGOGASTRODUODENOSCOPY TRANSORAL DIAGNOSTIC N/A 07/18/2016    Procedure: ESOPHAGOGASTRODUODENOSCOPY (EGD);  Surgeon: Mylene Moeller MD;  [facility]: [facility];  Service: Gastroenterology    CT INSJ/RPLCMT SPINAL NPG/RCVR POCKET CRTJ&CONNJ Left 06/04/2018    Procedure: removal of left buttock implantable pulse generator and placement of new  implantable pulse generator;  Surgeon: Wood Rogel MD;  Location: [facility];  Service: Neurosurgery      Family History   Problem Relation Age of Onset    Lung cancer Mother 46    Pulmonary embolism Father     No Known Problems Sister     No Known Problems Daughter     No Known Problems Daughter      Stroke Maternal Grandmother     Heart attack Maternal Grandfather     No Known Problems Paternal Grandmother     No Known Problems Paternal Grandfather     No Known Problems Maternal Aunt     Diabetes Family         Diabetes mellitus    Hypertension Family     Stroke Family         Stroke complications      Social History     Tobacco Use    Smoking status: Former     Current packs/day: 0.00     Types: Cigarettes     Quit date: 1970     Years since quittin.8    Smokeless tobacco: Never    Tobacco comments:     Denied history of current ever day smoker, Former smoker and Never smoker all documented in [employer]   Vaping Use    Vaping status: Never Used   Substance Use Topics    Alcohol use: Not Currently     Comment: Denied history of alcohol use    Drug use: No     Comment: Denied history of drug use      E-Cigarette/Vaping    E-Cigarette Use Never User       E-Cigarette/Vaping Substances    Nicotine No     THC No     CBD No     Flavoring No     Other No     Unknown No       I have reviewed and agree with the history as documented.     Patient is an 79-year-old female with past medical history of diabetes, hypertension, hyperlipidemia, GERD, fibromyalgia who presents today for evaluation of abnormal outpatient lab.  She was found to have a UTI on outpatient labs.  Patient was recently admitted here and found to have UTI for which she took antibiotics and had a Bateman catheter placed.  She was discharged home with a Bateman catheter 2 days ago she noticed hematuria in the Bateman was removed was sent for culture.  The results came back today which were consistent with a urinary tract infection only susceptible to IV antibiotics.  The patient endorses urinary incontinence, burning with urination and suprapubic pressure.  She denies fevers but endorses chills.  She denies nausea, vomiting, chest pain or shortness of breath.  She does endorse a couple episodes of diarrhea but it has since resolved.  She is currently  having normal bowel movements.          Review of Systems   Constitutional:  Positive for chills. Negative for fever.   Respiratory:  Negative for shortness of breath.    Cardiovascular:  Negative for chest pain.   Gastrointestinal:  Positive for abdominal pain. Negative for nausea and vomiting.   Genitourinary:  Positive for dysuria, frequency, hematuria and urgency.   Musculoskeletal:  Negative for back pain.   Neurological:  Negative for dizziness, numbness and headaches.           Objective       ED Triage Vitals   Temperature Pulse Blood Pressure Respirations SpO2 Patient Position - Orthostatic VS   10/25/24 1927 10/25/24 1927 10/25/24 1927 10/25/24 1927 10/25/24 1927 10/25/24 1930   98.3 °F (36.8 °C) 82 151/76 16 96 % Sitting      Temp Source Heart Rate Source BP Location FiO2 (%) Pain Score    10/25/24 1927 10/25/24 1927 10/25/24 1930 -- 10/25/24 2148    Oral Monitor Left arm  9      Vitals      Date and Time Temp Pulse SpO2 Resp BP Pain Score FACES Pain Rating User   10/26/24 1545 -- 75 88 % 18 -- -- -- DII   10/26/24 1338 -- -- -- -- -- 7 -- NM   10/26/24 1135 -- -- -- -- -- 8 -- NM   10/26/24 1100 -- -- -- -- 118/55 -- -- NM   10/26/24 0800 -- -- -- -- -- No Pain -- NM   10/26/24 0714 97.3 °F (36.3 °C) 80 94 % 16 129/60 -- -- DII   10/25/24 2325 -- -- -- -- -- 9 -- SI   10/25/24 2215 -- 82 100 % -- -- -- -- MS   10/25/24 2200 -- -- -- -- 139/64 -- -- MS   10/25/24 2148 -- 83 -- -- 134/63 9 -- MS   10/25/24 2100 -- 82 100 % 18 120/59 -- -- MS   10/25/24 1930 -- 82 97 % 16 151/76 -- -- MS   10/25/24 1927 98.3 °F (36.8 °C) 82 96 % 16 151/76 -- -- MS            Physical Exam  Vitals and nursing note reviewed.   Constitutional:       General: She is not in acute distress.     Appearance: Normal appearance. She is obese. She is not ill-appearing.   HENT:      Head: Normocephalic and atraumatic.      Nose: Nose normal.      Mouth/Throat:      Mouth: Mucous membranes are moist.      Pharynx: Oropharynx is clear.    Eyes:      Extraocular Movements: Extraocular movements intact.      Pupils: Pupils are equal, round, and reactive to light.   Cardiovascular:      Rate and Rhythm: Normal rate and regular rhythm.      Heart sounds: Normal heart sounds.   Pulmonary:      Effort: Pulmonary effort is normal. No respiratory distress.      Breath sounds: Normal breath sounds.   Abdominal:      General: Abdomen is flat. There is no distension.      Palpations: Abdomen is soft.      Comments: Suprapubic tenderness, right and left lower quadrant tenderness.   Musculoskeletal:      Comments: Chronic venous stasis of bilateral lower extremities.   Skin:     General: Skin is warm and dry.      Capillary Refill: Capillary refill takes less than 2 seconds.   Neurological:      General: No focal deficit present.      Mental Status: She is alert and oriented to person, place, and time.   Psychiatric:         Mood and Affect: Mood normal.         Behavior: Behavior normal.         Results Reviewed       Procedure Component Value Units Date/Time    Basic metabolic panel [[id number]]  (Abnormal) Collected: 10/26/24 0520    Lab Status: Final result Specimen: Blood from Arm, Right Updated: 10/26/24 0555     Sodium 135 mmol/L      Potassium 4.0 mmol/L      Chloride 97 mmol/L      CO2 29 mmol/L      ANION GAP 9 mmol/L      BUN 32 mg/dL      Creatinine 1.22 mg/dL      Glucose 127 mg/dL      Calcium 8.9 mg/dL      eGFR 41 ml/min/1.73sq m     Narrative:      National Kidney Disease Foundation guidelines for Chronic Kidney Disease (CKD):     Stage 1 with normal or high GFR (GFR > 90 mL/min/1.73 square meters)    Stage 2 Mild CKD (GFR = 60-89 mL/min/1.73 square meters)    Stage 3A Moderate CKD (GFR = 45-59 mL/min/1.73 square meters)    Stage 3B Moderate CKD (GFR = 30-44 mL/min/1.73 square meters)    Stage 4 Severe CKD (GFR = 15-29 mL/min/1.73 square meters)    Stage 5 End Stage CKD (GFR <15 mL/min/1.73 square meters)  Note: GFR calculation is accurate only  with a steady state creatinine    CBC (With Platelets) [[id number]]  (Abnormal) Collected: 10/26/24 0520    Lab Status: Final result Specimen: Blood from Arm, Right Updated: 10/26/24 0539     WBC 9.31 Thousand/uL      RBC 3.53 Million/uL      Hemoglobin 10.1 g/dL      Hematocrit 32.2 %      MCV 91 fL      MCH 28.6 pg      MCHC 31.4 g/dL      RDW 13.6 %      Platelets 278 Thousands/uL      MPV 10.1 fL     Fingerstick Glucose (POCT) [[id number]]  (Abnormal) Collected: 10/25/24 2157    Lab Status: Final result Specimen: Blood Updated: 10/25/24 2158     POC Glucose 156 mg/dl     Urine culture [[id number]] Collected: 10/25/24 2042    Lab Status: In process Specimen: Urine, Clean Catch Updated: 10/25/24 2046    Basic metabolic panel [[id number]]  (Abnormal) Collected: 10/25/24 2000    Lab Status: Final result Specimen: Blood from Arm, Left Updated: 10/25/24 2030     Sodium 135 mmol/L      Potassium 4.0 mmol/L      Chloride 94 mmol/L      CO2 32 mmol/L      ANION GAP 9 mmol/L      BUN 36 mg/dL      Creatinine 1.37 mg/dL      Glucose 191 mg/dL      Calcium 9.5 mg/dL      eGFR 36 ml/min/1.73sq m     Narrative:      National Kidney Disease Foundation guidelines for Chronic Kidney Disease (CKD):     Stage 1 with normal or high GFR (GFR > 90 mL/min/1.73 square meters)    Stage 2 Mild CKD (GFR = 60-89 mL/min/1.73 square meters)    Stage 3A Moderate CKD (GFR = 45-59 mL/min/1.73 square meters)    Stage 3B Moderate CKD (GFR = 30-44 mL/min/1.73 square meters)    Stage 4 Severe CKD (GFR = 15-29 mL/min/1.73 square meters)    Stage 5 End Stage CKD (GFR <15 mL/min/1.73 square meters)  Note: GFR calculation is accurate only with a steady state creatinine    CBC and differential [[id number]]  (Abnormal) Collected: 10/25/24 2000    Lab Status: Final result Specimen: Blood from Arm, Left Updated: 10/25/24 2013     WBC 8.37 Thousand/uL      RBC 3.84 Million/uL      Hemoglobin 11.0 g/dL      Hematocrit 35.1 %      MCV 91 fL      MCH 28.6 pg       MCHC 31.3 g/dL      RDW 13.7 %      MPV 10.2 fL      Platelets 327 Thousands/uL      nRBC 0 /100 WBCs      Segmented % 61 %      Immature Grans % 1 %      Lymphocytes % 21 %      Monocytes % 8 %      Eosinophils Relative 8 %      Basophils Relative 1 %      Absolute Neutrophils 5.17 Thousands/µL      Absolute Immature Grans 0.07 Thousand/uL      Absolute Lymphocytes 1.77 Thousands/µL      Absolute Monocytes 0.66 Thousand/µL      Eosinophils Absolute 0.66 Thousand/µL      Basophils Absolute 0.04 Thousands/µL             No orders to display       Procedures    ED Medication and Procedure Management   Prior to Admission Medications   Prescriptions Last Dose Informant Patient Reported? Taking?   Blood Glucose Monitoring Suppl (OneTouch Verio Reflect) w/Device KIT  Self No No   Sig: Check blood sugars twice daily. Please substitute with appropriate alternative as covered by patient's insurance. Dx: E11.65   MAGNESIUM OXIDE 400 PO  Self Yes No   Sig: Take 400 mg by mouth 2 (two) times a day   Milnacipran HCl (Savella) 100 MG TABS  Self No No   Sig: Take 1 tablet (100 mg total) by mouth daily   OneTouch Delica Lancets 33G MISC  Self No No   Sig: Check blood sugars twice daily. Please substitute with appropriate alternative as covered by patient's insurance. Dx: E11.65   acetaminophen (TYLENOL) 325 mg tablet   No No   Sig: Take 3 tablets (975 mg total) by mouth every 8 (eight) hours   albuterol (PROVENTIL HFA,VENTOLIN HFA) 90 mcg/act inhaler  Self No No   Sig: Inhale 2 puffs every 6 (six) hours as needed for wheezing or shortness of breath   aluminum-magnesium hydroxide-simethicone (MAALOX) 5663-5468-775 mg/30 mL suspension   No No   Sig: Take 30 mL by mouth every 6 (six) hours as needed for indigestion or heartburn   aspirin 81 mg chewable tablet  Self Yes No   Sig: Chew 81 mg daily   atorvastatin (LIPITOR) 40 mg tablet  Self No No   Sig: TAKE ONE TABLET BY MOUTH ONCE DAILY   Patient taking differently: Take 40 mg by  mouth daily   baclofen 10 mg tablet  Self Yes No   Sig: Take 10 mg by mouth 2 (two) times a day as needed for muscle spasms   docusate sodium (COLACE) 100 mg capsule  Self No No   Sig: Take 1 capsule (100 mg total) by mouth 2 (two) times a day   ferrous sulfate 325 (65 Fe) mg tablet  Self No No   Sig: Take 1 tablet (325 mg total) by mouth daily with breakfast   glucose blood (OneTouch Verio) test strip  Self No No   Sig: Check blood sugars twice daily. Please substitute with appropriate alternative as covered by patient's insurance. Dx: E11.65   latanoprost (XALATAN) 0.005 % ophthalmic solution  Self Yes No   Sig: Administer 1 drop to both eyes daily at bedtime   levothyroxine 75 mcg tablet  Self No No   Sig: Take 0.5 tablets (37.5 mcg total) by mouth daily in the early morning   lidocaine (LIDODERM) 5 %   No No   Sig: Apply 1 patch topically over 12 hours daily Remove & Discard patch within 12 hours or as directed by MD   meclizine (ANTIVERT) 25 mg tablet  Self No No   Sig: Take 1 tablet (25 mg total) by mouth 4 (four) times a day as needed for dizziness   Patient taking differently: Take 25 mg by mouth daily as needed for dizziness   metFORMIN (GLUCOPHAGE) 500 mg tablet   No No   Sig: Take 1 tablet (500 mg total) by mouth 2 (two) times a day with meals   metoprolol succinate (TOPROL-XL) 50 mg 24 hr tablet  Self No No   Sig: Take 1 tablet (50 mg total) by mouth every 12 (twelve) hours   pantoprazole (PROTONIX) 40 mg tablet  Self No No   Sig: Take 1 tablet (40 mg total) by mouth daily   polyethylene glycol (MIRALAX) 17 g packet  Self No No   Sig: Take 17 g by mouth daily   senna (SENOKOT) 8.6 mg  Self No No   Sig: Take 2 tablets (17.2 mg total) by mouth daily at bedtime   sucralfate (CARAFATE) 1 g tablet   No No   Sig: Take 1 tablet (1 g total) by mouth 2 (two) times a day before meals for 7 days   torsemide (DEMADEX) 20 mg tablet  Outside Facility (Specify) Yes No   Sig: Take 20 mg by mouth daily   zolpidem  (AMBIEN) 5 mg tablet   No No   Sig: Take 1 tablet (5 mg total) by mouth daily at bedtime as needed for sleep for up to 3 doses      Facility-Administered Medications: None     Current Discharge Medication List        CONTINUE these medications which have NOT CHANGED    Details   acetaminophen (TYLENOL) 325 mg tablet Take 3 tablets (975 mg total) by mouth every 8 (eight) hours    Associated Diagnoses: Degenerative lumbar spinal stenosis      albuterol (PROVENTIL HFA,VENTOLIN HFA) 90 mcg/act inhaler Inhale 2 puffs every 6 (six) hours as needed for wheezing or shortness of breath  Qty: 6.7 g, Refills: 5    Comments: Substitution to a formulary equivalent within the same pharmaceutical class is authorized.  Associated Diagnoses: Mild intermittent asthma without complication      aluminum-magnesium hydroxide-simethicone (MAALOX) 3862-1386-214 mg/30 mL suspension Take 30 mL by mouth every 6 (six) hours as needed for indigestion or heartburn    Associated Diagnoses: GERD (gastroesophageal reflux disease)      aspirin 81 mg chewable tablet Chew 81 mg daily      atorvastatin (LIPITOR) 40 mg tablet TAKE ONE TABLET BY MOUTH ONCE DAILY  Qty: 90 tablet, Refills: 0    Associated Diagnoses: High cholesterol      baclofen 10 mg tablet Take 10 mg by mouth 2 (two) times a day as needed for muscle spasms      Blood Glucose Monitoring Suppl (OneTouch Verio Reflect) w/Device KIT Check blood sugars twice daily. Please substitute with appropriate alternative as covered by patient's insurance. Dx: E11.65  Qty: 1 kit, Refills: 0    Associated Diagnoses: Type 2 diabetes mellitus without complication, without long-term current use of insulin ([facility])      docusate sodium (COLACE) 100 mg capsule Take 1 capsule (100 mg total) by mouth 2 (two) times a day    Associated Diagnoses: Constipation, unspecified constipation type      ferrous sulfate 325 (65 Fe) mg tablet Take 1 tablet (325 mg total) by mouth daily with breakfast  Qty: 90 tablet, Refills:  0    Associated Diagnoses: Anemia, unspecified type      glucose blood (OneTouch Verio) test strip Check blood sugars twice daily. Please substitute with appropriate alternative as covered by patient's insurance. Dx: E11.65  Qty: 200 each, Refills: 3    Associated Diagnoses: Type 2 diabetes mellitus without complication, without long-term current use of insulin ([facility])      latanoprost (XALATAN) 0.005 % ophthalmic solution Administer 1 drop to both eyes daily at bedtime      levothyroxine 75 mcg tablet Take 0.5 tablets (37.5 mcg total) by mouth daily in the early morning    Associated Diagnoses: Hypothyroidism, unspecified type      lidocaine (LIDODERM) 5 % Apply 1 patch topically over 12 hours daily Remove & Discard patch within 12 hours or as directed by MD    Associated Diagnoses: Degenerative lumbar spinal stenosis      MAGNESIUM OXIDE 400 PO Take 400 mg by mouth 2 (two) times a day      meclizine (ANTIVERT) 25 mg tablet Take 1 tablet (25 mg total) by mouth 4 (four) times a day as needed for dizziness  Qty: 60 tablet, Refills: 0    Associated Diagnoses: Vertigo      metFORMIN (GLUCOPHAGE) 500 mg tablet Take 1 tablet (500 mg total) by mouth 2 (two) times a day with meals    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 Please substitute with appropriate alternative as covered by patient's insurance. Dx: E11.65  Qty: 200 each, Refills: 3    Associated Diagnoses: Type 2 diabetes mellitus without complication, without long-term current use of insulin ([facility])      pantoprazole (PROTONIX) 40  mg tablet Take 1 tablet (40 mg total) by mouth daily    Comments: DX Code Needed  .  Associated Diagnoses: Gastroesophageal reflux disease without esophagitis      polyethylene glycol (MIRALAX) 17 g packet Take 17 g by mouth daily    Associated Diagnoses: Constipation, unspecified constipation type      senna (SENOKOT) 8.6 mg Take 2 tablets (17.2 mg total) by mouth daily at bedtime    Associated Diagnoses: Constipation, unspecified constipation type      sucralfate (CARAFATE) 1 g tablet Take 1 tablet (1 g total) by mouth 2 (two) times a day before meals for 7 days  Qty: 14 tablet, Refills: 0    Associated Diagnoses: GERD (gastroesophageal reflux disease)      torsemide (DEMADEX) 20 mg tablet Take 20 mg by mouth daily      zolpidem (AMBIEN) 5 mg tablet Take 1 tablet (5 mg total) by mouth daily at bedtime as needed for sleep for up to 3 doses  Qty: 3 tablet, Refills: 0    Associated Diagnoses: Insomnia           No discharge procedures on file.  ED SEPSIS DOCUMENTATION   Time reflects when diagnosis was documented in both MDM as applicable and the Disposition within this note       Time User Action Codes Description Comment    10/25/2024  8:34 PM Adry Sexton Add [N30.90] Cystitis     10/25/2024  8:52 PM Toño Kyle Add [N30.90,  B96.1] Klebsiella cystitis     10/25/2024  8:53 PM Toño Kyle Remove [N30.90,  B96.1] Klebsiella cystitis                  Adry Sexton,   10/26/24 1651

## 2024-10-25 NOTE — TELEPHONE ENCOUNTER
Called and spoke with [facility] Nurse @ facility. States Mattie passed void trial. Unable to give me PVR but is doing well and urinating on her own. Advised appts for Monday cancelled. Advised to contact office with any questions or concerns. Verbalized understanding.

## 2024-10-25 NOTE — ED ATTENDING ATTESTATION
Final Diagnoses:     1. Cystitis      ED Course as of 10/26/24 1902   Fri Oct 25, 2024   2008 Will do Ertapanem based on sensitivities.       I, Roberto Carlos Kramer MD, saw and evaluated the patient. All available labs and X-rays were ordered by me or the resident / non-physician and have been reviewed by myself. I discussed the patient with the resident / non-physician and agree with the resident's / non-physician practitioner's findings and plan as documented in the resident's / non-physician practicitioner's note, except where noted.   At this point, I agree with the current assessment done in the ED.   I was present during key portions of all procedures performed unless otherwise stated.     HPI:  NURSING TRIAGE:    This is a 79 y.o. female presenting for evaluation of UTI.   She was seen here 1 week ago for numbness/tingling. Found to have UTI ? DC-ed with Ahn to facility ? 2 days ago, hematuria started. Ahn removed. Urine sent for testing, came positive today.  +suprapubic pressure  Burnign with urination  +incontinent of urine (new)  Hx of similar with UTIs before.  No fevers  +chills  No nausea/vomiting  +diarrhea (resolved).   No CP/SOB.  No headaches.   Chief Complaint   Patient presents with    Abnormal Lab     Sent from [facility] for abnormal labs that were drawn on 10/23. Pt reports having repeated UTI's which the nursing home didn't have proper antibiotics to treat. Pt reports having a ahn removed two days ago due to blood in her urine. Pt also reports burning when she pees and worsening back spasms.       PHYSICAL: ASSESSMENT + PLAN:   Pertinent: Lower belly tenderness  Soft  No rebound  No guarding  Venous stasis bilateral legs ()chronic, not new)    General: VS reviewed  Appears in NAD  awake, alert.   Well-nourished, well-developed. Appears stated age.   Speaking normally in full sentences.   Head: Normocephalic, atraumatic  Eyes: EOM-I. No diplopia.   No hyphema.   No  subconjunctival hemorrhages.  Symmetrical lids.   ENT: Atraumatic external nose and ears.    MMM  No malocclusion. No stridor. Normal phonation. No drooling. Normal swallowing.   Neck: No JVD.  CV: No pallor noted  Lungs:   No tachypnea  No respiratory distress  MSK:   FROM spontaneously  Skin: Dry, intact.   Neuro: Awake, alert, GCS15, CN II-XII grossly intact.   Motor grossly intact.  Psychiatric/Behavioral: interacting normally; appropriate mood/affect.    Exam: deferred    Vitals:    10/25/24 2215 10/26/24 0714 10/26/24 1100 10/26/24 1545   BP:  129/60 118/55    BP Location:       Pulse: 82 80  75   Resp:  16  18   Temp:  (!) 97.3 °F (36.3 °C)     TempSrc:       SpO2: 100% 94%  (!) 88%    UTI: ESBL    Abx?     There are no obvious limitations to social determinants of care.   Nursing note reviewed.   Vitals reviewed.   Orders placed by myself and/or advanced practitioner / resident.    Previous chart was reviewed  No language barrier.   History obtained from patient.    There are no limitations to the history obtained:     Past Medical: Past Surgical:    has a past medical history of Acid reflux, Acute kidney injury (HCC), Anemia, Anxiety, Arthritis, Asthma, Basal cell carcinoma, Chronic narcotic dependence (HCC), Chronic pain, Colon polyp, Cystocele, Diabetes mellitus (HCC), Disease of thyroid gland, Diverticulosis, Dizziness, Dysfunctional uterine bleeding, Dysphagia, Fibromyalgia, Gastric ulcer, Gastroparesis, History of colonic polyps, History of gastroesophageal reflux (GERD), Hypercholesterolemia, Hyperlipidemia, Hypertension, Hyponatremia, IBS (irritable bowel syndrome), Pneumobilia (06/18/2022), Post laminectomy syndrome, S/P insertion of spinal cord stimulator (07/18/2018), s/p Medtronic loop recorder 3/2/2024 (03/02/2024), Seasonal allergies, Shortness of breath, Spinal stenosis, Status post lumbar spinal fusion (03/16/2018), and Stroke ([facility]).  has a past surgical history that includes Appendectomy;  Cholecystectomy; Hernia repair; Laminectomy; pr dilation esoph unguided sound/bougie 1/mult pass (N/A, 2016); Esophagogastroduodenoscopy (N/A, 2016); pr esophagogastroduodenoscopy transoral diagnostic (N/A, 2016); pr colonoscopy flx dx w/collj spec when pfrmd (N/A, 2016); Lumbar laminectomy; Back surgery; pr arthrodesis posterior/pstlat tq 1ntrspc thoracic (N/A, 2018); pr insj/rplcmt spinal npg/rcvr pocket crtj&connj (Left, 2018); Hysterectomy; Oophorectomy; Colonoscopy; Breast cyst excision (Left); Cardiac catheterization (2023); Cardiac catheterization (N/A, 2023); and Cardiac electrophysiology procedure (N/A, 3/2/2024).   Social: Cardiac (Echo/Cath)   Social History     Substance and Sexual Activity   Alcohol Use Not Currently    Comment: Denied history of alcohol use     Social History     Tobacco Use   Smoking Status Former    Current packs/day: 0.00    Types: Cigarettes    Quit date: 1970    Years since quittin.8   Smokeless Tobacco Never   Tobacco Comments    Denied history of current ever day smoker, Former smoker and Never smoker all documented in [employer]     Social History     Substance and Sexual Activity   Drug Use No    Comment: Denied history of drug use    No results found for this or any previous visit.    No results found for this or any previous visit.    No results found for this or any previous visit.     Labs: Imaging:   Labs Reviewed   CBC AND DIFFERENTIAL - Abnormal       Result Value Ref Range Status    WBC 8.37  4.31 - 10.16 Thousand/uL Final    RBC 3.84  3.81 - 5.12 Million/uL Final    Hemoglobin 11.0 (*) 11.5 - 15.4 g/dL Final    Hematocrit 35.1  34.8 - 46.1 % Final    MCV 91  82 - 98 fL Final    MCH 28.6  26.8 - 34.3 pg Final    MCHC 31.3 (*) 31.4 - 37.4 g/dL Final    RDW 13.7  11.6 - 15.1 % Final    MPV 10.2  8.9 - 12.7 fL Final    Platelets 327  149 - 390 Thousands/uL Final    nRBC 0  /100 WBCs Final    Segmented % 61  43 - 75  % Final    Immature Grans % 1  0 - 2 % Final    Lymphocytes % 21  14 - 44 % Final    Monocytes % 8  4 - 12 % Final    Eosinophils Relative 8 (*) 0 - 6 % Final    Basophils Relative 1  0 - 1 % Final    Absolute Neutrophils 5.17  1.85 - 7.62 Thousands/µL Final    Absolute Immature Grans 0.07  0.00 - 0.20 Thousand/uL Final    Absolute Lymphocytes 1.77  0.60 - 4.47 Thousands/µL Final    Absolute Monocytes 0.66  0.17 - 1.22 Thousand/µL Final    Eosinophils Absolute 0.66 (*) 0.00 - 0.61 Thousand/µL Final    Basophils Absolute 0.04  0.00 - 0.10 Thousands/µL Final   BASIC METABOLIC PANEL - Abnormal    Sodium 135  135 - 147 mmol/L Final    Potassium 4.0  3.5 - 5.3 mmol/L Final    Chloride 94 (*) 96 - 108 mmol/L Final    CO2 32  21 - 32 mmol/L Final    ANION GAP 9  4 - 13 mmol/L Final    BUN 36 (*) 5 - 25 mg/dL Final    Creatinine 1.37 (*) 0.60 - 1.30 mg/dL Final    Comment: Standardized to IDMS reference method    Glucose 191 (*) 65 - 140 mg/dL Final    Comment: If the patient is fasting, the ADA then defines impaired fasting glucose as > 100 mg/dL and diabetes as > or equal to 123 mg/dL.    Calcium 9.5  8.4 - 10.2 mg/dL Final    eGFR 36  ml/min/1.73sq m Final    Narrative:     National Kidney Disease Foundation guidelines for Chronic Kidney Disease (CKD):     Stage 1 with normal or high GFR (GFR > 90 mL/min/1.73 square meters)    Stage 2 Mild CKD (GFR = 60-89 mL/min/1.73 square meters)    Stage 3A Moderate CKD (GFR = 45-59 mL/min/1.73 square meters)    Stage 3B Moderate CKD (GFR = 30-44 mL/min/1.73 square meters)    Stage 4 Severe CKD (GFR = 15-29 mL/min/1.73 square meters)    Stage 5 End Stage CKD (GFR <15 mL/min/1.73 square meters)  Note: GFR calculation is accurate only with a steady state creatinine   POCT GLUCOSE - Abnormal    POC Glucose 156 (*) 65 - 140 mg/dl Final    No orders to display      Medications: Code Status:   Medications   acetaminophen (TYLENOL) tablet 975 mg (975 mg Oral Given 10/26/24 1338)   albuterol  (PROVENTIL HFA,VENTOLIN HFA) inhaler 2 puff (has no administration in time range)   aluminum-magnesium hydroxide-simethicone (MAALOX) oral suspension 30 mL (has no administration in time range)   aspirin chewable tablet 81 mg (81 mg Oral Given 10/26/24 0826)   atorvastatin (LIPITOR) tablet 40 mg (40 mg Oral Given 10/26/24 0826)   baclofen tablet 10 mg (10 mg Oral Given 10/26/24 0128)   docusate sodium (COLACE) capsule 100 mg (100 mg Oral Given 10/26/24 1712)   latanoprost (XALATAN) 0.005 % ophthalmic solution 1 drop (1 drop Both Eyes Not Given 10/26/24 0513)   levothyroxine tablet 37.5 mcg (37.5 mcg Oral Given 10/26/24 0513)   lidocaine (LIDODERM) 5 % patch 1 patch (1 patch Topical Medication Applied 10/26/24 0826)   metoprolol succinate (TOPROL-XL) 24 hr tablet 50 mg (50 mg Oral Given 10/26/24 0826)   pantoprazole (PROTONIX) EC tablet 40 mg (40 mg Oral Given 10/26/24 0826)   Milnacipran HCl TABS 100 mg (100 mg Oral Not Given 10/26/24 0827)   sucralfate (CARAFATE) tablet 1 g (1 g Oral Given 10/26/24 1712)   senna (SENOKOT) tablet 17.2 mg (17.2 mg Oral Not Given 10/25/24 2147)   torsemide (DEMADEX) tablet 30 mg (30 mg Oral Given 10/26/24 1133)   temazepam (RESTORIL) capsule 15 mg (15 mg Oral Given 10/25/24 2326)   oxyCODONE (ROXICODONE) IR tablet 5 mg ( Oral See Alternative 10/26/24 1715)     Or   oxyCODONE (ROXICODONE) immediate release tablet 10 mg (10 mg Oral Given 10/26/24 1715)   ondansetron (ZOFRAN) injection 4 mg (has no administration in time range)   heparin (porcine) subcutaneous injection 5,000 Units (5,000 Units Subcutaneous Given 10/26/24 1338)   insulin lispro (HumALOG/ADMELOG) 100 units/mL subcutaneous injection 1-6 Units ( Subcutaneous Not Given 10/26/24 1711)   insulin lispro (HumALOG/ADMELOG) 100 units/mL subcutaneous injection 1-5 Units (1 Units Subcutaneous Given 10/25/24 2159)   HYDROmorphone (DILAUDID) injection 0.5 mg (has no administration in time range)   amikacin (AMIKIN) 850 mg in sodium  chloride 0.9 % 100 mL IVPB (850 mg Intravenous New Bag 10/26/24 0277)   ertapenem (INVanz) 1,000 mg in sodium chloride 0.9 % 50 mL IVPB (0 mg Intravenous Stopped 10/25/24 5174)    Code Status: Level 1 - Full Code  Advance Directive and Living Will:      Power of :    POLST:       Orders Placed This Encounter   Procedures    Urine culture    CBC and differential    Basic metabolic panel    Basic metabolic panel    CBC (With Platelets)    Basic metabolic panel    CBC    Diet Josué/CHO Controlled; Consistent Carbohydrate Diet Level 2 (5 carb servings/75 grams CHO/meal)    Insert peripheral IV    Insulin Subcutaneous Notify Physician    Insulin Subcutaneous Instruction    Hypoglycemia Protocol    Vital Signs per unit routine    Up and OOB as tolerated    I/O    Apply SCD or Foot pumps    Fingerstick Glucose (POCT)    Initiate voiding trial    Nursing Communication Can use pure wick    Insert urinary catheter    Level 1-Full Code: all life saving measures are indicated    Inpatient consult to Infectious Diseases    INPATIENT ADMISSION     Time reflects when diagnosis was documented in both MDM as applicable and the Disposition within this note       Time User Action Codes Description Comment    10/25/2024  8:34 PM Adry Sexton Add [N30.90] Cystitis     10/25/2024  8:52 PM Toño Kyle Add [N30.90,  B96.1] Klebsiella cystitis     10/25/2024  8:53 PM Toño Kyle Remove [N30.90,  B96.1] Klebsiella cystitis           ED Disposition       ED Disposition   Admit    Condition   Stable    Date/Time   Fri Oct 25, 2024  8:34 PM    Comment   Case was discussed with Dr. Kyle and the patient's admission status was agreed to be Admission Status: inpatient status to the service of Dr. Kyle .               Follow-up Information    None       Current Discharge Medication List        CONTINUE these medications which have NOT CHANGED    Details   acetaminophen (TYLENOL) 325 mg tablet Take 3 tablets (975 mg  total) by mouth every 8 (eight) hours    Associated Diagnoses: Degenerative lumbar spinal stenosis      albuterol (PROVENTIL HFA,VENTOLIN HFA) 90 mcg/act inhaler Inhale 2 puffs every 6 (six) hours as needed for wheezing or shortness of breath  Qty: 6.7 g, Refills: 5    Comments: Substitution to a formulary equivalent within the same pharmaceutical class is authorized.  Associated Diagnoses: Mild intermittent asthma without complication      aluminum-magnesium hydroxide-simethicone (MAALOX) 0202-2490-718 mg/30 mL suspension Take 30 mL by mouth every 6 (six) hours as needed for indigestion or heartburn    Associated Diagnoses: GERD (gastroesophageal reflux disease)      aspirin 81 mg chewable tablet Chew 81 mg daily      atorvastatin (LIPITOR) 40 mg tablet TAKE ONE TABLET BY MOUTH ONCE DAILY  Qty: 90 tablet, Refills: 0    Associated Diagnoses: High cholesterol      baclofen 10 mg tablet Take 10 mg by mouth 2 (two) times a day as needed for muscle spasms      Blood Glucose Monitoring Suppl (OneTouch Verio Reflect) w/Device KIT Check blood sugars twice daily. Please substitute with appropriate alternative as covered by patient's insurance. Dx: E11.65  Qty: 1 kit, Refills: 0    Associated Diagnoses: Type 2 diabetes mellitus without complication, without long-term current use of insulin (HCC)      docusate sodium (COLACE) 100 mg capsule Take 1 capsule (100 mg total) by mouth 2 (two) times a day    Associated Diagnoses: Constipation, unspecified constipation type      ferrous sulfate 325 (65 Fe) mg tablet Take 1 tablet (325 mg total) by mouth daily with breakfast  Qty: 90 tablet, Refills: 0    Associated Diagnoses: Anemia, unspecified type      glucose blood (OneTouch Verio) test strip Check blood sugars twice daily. Please substitute with appropriate alternative as covered by patient's insurance. Dx: E11.65  Qty: 200 each, Refills: 3    Associated Diagnoses: Type 2 diabetes mellitus without complication, without long-term  current use of insulin ([facility])      latanoprost (XALATAN) 0.005 % ophthalmic solution Administer 1 drop to both eyes daily at bedtime      levothyroxine 75 mcg tablet Take 0.5 tablets (37.5 mcg total) by mouth daily in the early morning    Associated Diagnoses: Hypothyroidism, unspecified type      lidocaine (LIDODERM) 5 % Apply 1 patch topically over 12 hours daily Remove & Discard patch within 12 hours or as directed by MD    Associated Diagnoses: Degenerative lumbar spinal stenosis      MAGNESIUM OXIDE 400 PO Take 400 mg by mouth 2 (two) times a day      meclizine (ANTIVERT) 25 mg tablet Take 1 tablet (25 mg total) by mouth 4 (four) times a day as needed for dizziness  Qty: 60 tablet, Refills: 0    Associated Diagnoses: Vertigo      metFORMIN (GLUCOPHAGE) 500 mg tablet Take 1 tablet (500 mg total) by mouth 2 (two) times a day with meals    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 Please substitute with appropriate alternative as covered by patient's insurance. Dx: E11.65  Qty: 200 each, Refills: 3    Associated Diagnoses: Type 2 diabetes mellitus without complication, without long-term current use of insulin ([facility])      pantoprazole (PROTONIX) 40 mg tablet Take 1 tablet (40 mg total) by mouth daily    Comments: DX Code Needed  .  Associated Diagnoses: Gastroesophageal reflux disease without esophagitis      polyethylene glycol (MIRALAX) 17 g packet Take 17 g by mouth daily    Associated Diagnoses: Constipation, unspecified constipation type      senna (SENOKOT) 8.6 mg Take 2  tablets (17.2 mg total) by mouth daily at bedtime    Associated Diagnoses: Constipation, unspecified constipation type      sucralfate (CARAFATE) 1 g tablet Take 1 tablet (1 g total) by mouth 2 (two) times a day before meals for 7 days  Qty: 14 tablet, Refills: 0    Associated Diagnoses: GERD (gastroesophageal reflux disease)      torsemide (DEMADEX) 20 mg tablet Take 20 mg by mouth daily      zolpidem (AMBIEN) 5 mg tablet Take 1 tablet (5 mg total) by mouth daily at bedtime as needed for sleep for up to 3 doses  Qty: 3 tablet, Refills: 0    Associated Diagnoses: Insomnia           No discharge procedures on file.  Prior to Admission Medications   Prescriptions Last Dose Informant Patient Reported? Taking?   Blood Glucose Monitoring Suppl (OneTouch Verio Reflect) w/Device KIT  Self No No   Sig: Check blood sugars twice daily. Please substitute with appropriate alternative as covered by patient's insurance. Dx: E11.65   MAGNESIUM OXIDE 400 PO  Self Yes No   Sig: Take 400 mg by mouth 2 (two) times a day   Milnacipran HCl (Savella) 100 MG TABS  Self No No   Sig: Take 1 tablet (100 mg total) by mouth daily   OneTouch Delica Lancets 33G MISC  Self No No   Sig: Check blood sugars twice daily. Please substitute with appropriate alternative as covered by patient's insurance. Dx: E11.65   acetaminophen (TYLENOL) 325 mg tablet   No No   Sig: Take 3 tablets (975 mg total) by mouth every 8 (eight) hours   albuterol (PROVENTIL HFA,VENTOLIN HFA) 90 mcg/act inhaler  Self No No   Sig: Inhale 2 puffs every 6 (six) hours as needed for wheezing or shortness of breath   aluminum-magnesium hydroxide-simethicone (MAALOX) 7714-4940-511 mg/30 mL suspension   No No   Sig: Take 30 mL by mouth every 6 (six) hours as needed for indigestion or heartburn   aspirin 81 mg chewable tablet  Self Yes No   Sig: Chew 81 mg daily   atorvastatin (LIPITOR) 40 mg tablet  Self No No   Sig: TAKE ONE TABLET BY MOUTH ONCE DAILY   Patient taking differently:  Take 40 mg by mouth daily   baclofen 10 mg tablet  Self Yes No   Sig: Take 10 mg by mouth 2 (two) times a day as needed for muscle spasms   docusate sodium (COLACE) 100 mg capsule  Self No No   Sig: Take 1 capsule (100 mg total) by mouth 2 (two) times a day   ferrous sulfate 325 (65 Fe) mg tablet  Self No No   Sig: Take 1 tablet (325 mg total) by mouth daily with breakfast   glucose blood (OneTouch Verio) test strip  Self No No   Sig: Check blood sugars twice daily. Please substitute with appropriate alternative as covered by patient's insurance. Dx: E11.65   latanoprost (XALATAN) 0.005 % ophthalmic solution  Self Yes No   Sig: Administer 1 drop to both eyes daily at bedtime   levothyroxine 75 mcg tablet  Self No No   Sig: Take 0.5 tablets (37.5 mcg total) by mouth daily in the early morning   lidocaine (LIDODERM) 5 %   No No   Sig: Apply 1 patch topically over 12 hours daily Remove & Discard patch within 12 hours or as directed by MD   meclizine (ANTIVERT) 25 mg tablet  Self No No   Sig: Take 1 tablet (25 mg total) by mouth 4 (four) times a day as needed for dizziness   Patient taking differently: Take 25 mg by mouth daily as needed for dizziness   metFORMIN (GLUCOPHAGE) 500 mg tablet   No No   Sig: Take 1 tablet (500 mg total) by mouth 2 (two) times a day with meals   metoprolol succinate (TOPROL-XL) 50 mg 24 hr tablet  Self No No   Sig: Take 1 tablet (50 mg total) by mouth every 12 (twelve) hours   pantoprazole (PROTONIX) 40 mg tablet  Self No No   Sig: Take 1 tablet (40 mg total) by mouth daily   polyethylene glycol (MIRALAX) 17 g packet  Self No No   Sig: Take 17 g by mouth daily   senna (SENOKOT) 8.6 mg  Self No No   Sig: Take 2 tablets (17.2 mg total) by mouth daily at bedtime   sucralfate (CARAFATE) 1 g tablet   No No   Sig: Take 1 tablet (1 g total) by mouth 2 (two) times a day before meals for 7 days   torsemide (DEMADEX) 20 mg tablet  Outside Facility (Specify) Yes No   Sig: Take 20 mg by mouth daily  "  zolpidem (AMBIEN) 5 mg tablet   No No   Sig: Take 1 tablet (5 mg total) by mouth daily at bedtime as needed for sleep for up to 3 doses      Facility-Administered Medications: None                        Portions of the record may have been created with voice recognition software. Occasional wrong word or \"sound a like\" substitutions may have occurred due to the inherent limitations of voice recognition software. Read the chart carefully and recognize, using context, where substitutions have occurred.    Electronically signed by:  Roberto Carlos Kramer  "

## 2024-10-26 LAB
ANION GAP SERPL CALCULATED.3IONS-SCNC: 9 MMOL/L (ref 4–13)
BUN SERPL-MCNC: 32 MG/DL (ref 5–25)
CALCIUM SERPL-MCNC: 8.9 MG/DL (ref 8.4–10.2)
CHLORIDE SERPL-SCNC: 97 MMOL/L (ref 96–108)
CO2 SERPL-SCNC: 29 MMOL/L (ref 21–32)
CREAT SERPL-MCNC: 1.22 MG/DL (ref 0.6–1.3)
ERYTHROCYTE [DISTWIDTH] IN BLOOD BY AUTOMATED COUNT: 13.6 % (ref 11.6–15.1)
GFR SERPL CREATININE-BSD FRML MDRD: 41 ML/MIN/1.73SQ M
GLUCOSE SERPL-MCNC: 126 MG/DL (ref 65–140)
GLUCOSE SERPL-MCNC: 127 MG/DL (ref 65–140)
GLUCOSE SERPL-MCNC: 131 MG/DL (ref 65–140)
GLUCOSE SERPL-MCNC: 131 MG/DL (ref 65–140)
GLUCOSE SERPL-MCNC: 142 MG/DL (ref 65–140)
HCT VFR BLD AUTO: 32.2 % (ref 34.8–46.1)
HGB BLD-MCNC: 10.1 G/DL (ref 11.5–15.4)
MCH RBC QN AUTO: 28.6 PG (ref 26.8–34.3)
MCHC RBC AUTO-ENTMCNC: 31.4 G/DL (ref 31.4–37.4)
MCV RBC AUTO: 91 FL (ref 82–98)
PLATELET # BLD AUTO: 278 THOUSANDS/UL (ref 149–390)
PMV BLD AUTO: 10.1 FL (ref 8.9–12.7)
POTASSIUM SERPL-SCNC: 4 MMOL/L (ref 3.5–5.3)
RBC # BLD AUTO: 3.53 MILLION/UL (ref 3.81–5.12)
SODIUM SERPL-SCNC: 135 MMOL/L (ref 135–147)
WBC # BLD AUTO: 9.31 THOUSAND/UL (ref 4.31–10.16)

## 2024-10-26 PROCEDURE — 97163 PT EVAL HIGH COMPLEX 45 MIN: CPT

## 2024-10-26 PROCEDURE — 85027 COMPLETE CBC AUTOMATED: CPT | Performed by: INTERNAL MEDICINE

## 2024-10-26 PROCEDURE — 97167 OT EVAL HIGH COMPLEX 60 MIN: CPT

## 2024-10-26 PROCEDURE — 99233 SBSQ HOSP IP/OBS HIGH 50: CPT

## 2024-10-26 PROCEDURE — 82948 REAGENT STRIP/BLOOD GLUCOSE: CPT

## 2024-10-26 PROCEDURE — 80048 BASIC METABOLIC PNL TOTAL CA: CPT | Performed by: INTERNAL MEDICINE

## 2024-10-26 PROCEDURE — 99223 1ST HOSP IP/OBS HIGH 75: CPT | Performed by: STUDENT IN AN ORGANIZED HEALTH CARE EDUCATION/TRAINING PROGRAM

## 2024-10-26 RX ORDER — HYDROMORPHONE HCL/PF 1 MG/ML
0.5 SYRINGE (ML) INJECTION
Status: DISCONTINUED | OUTPATIENT
Start: 2024-10-26 | End: 2024-10-30 | Stop reason: HOSPADM

## 2024-10-26 RX ADMIN — SUCRALFATE 1 G: 1 TABLET ORAL at 17:12

## 2024-10-26 RX ADMIN — METOPROLOL SUCCINATE 50 MG: 50 TABLET, EXTENDED RELEASE ORAL at 21:04

## 2024-10-26 RX ADMIN — OXYCODONE HYDROCHLORIDE 10 MG: 10 TABLET ORAL at 17:15

## 2024-10-26 RX ADMIN — HEPARIN SODIUM 5000 UNITS: 5000 INJECTION INTRAVENOUS; SUBCUTANEOUS at 05:13

## 2024-10-26 RX ADMIN — HEPARIN SODIUM 5000 UNITS: 5000 INJECTION INTRAVENOUS; SUBCUTANEOUS at 21:04

## 2024-10-26 RX ADMIN — ACETAMINOPHEN 975 MG: 325 TABLET, FILM COATED ORAL at 05:13

## 2024-10-26 RX ADMIN — LEVOTHYROXINE SODIUM 37.5 MCG: 75 TABLET ORAL at 05:13

## 2024-10-26 RX ADMIN — ASPIRIN 81 MG CHEWABLE TABLET 81 MG: 81 TABLET CHEWABLE at 08:26

## 2024-10-26 RX ADMIN — TEMAZEPAM 15 MG: 15 CAPSULE ORAL at 21:09

## 2024-10-26 RX ADMIN — ATORVASTATIN CALCIUM 40 MG: 40 TABLET, FILM COATED ORAL at 08:26

## 2024-10-26 RX ADMIN — DOCUSATE SODIUM 100 MG: 100 CAPSULE, LIQUID FILLED ORAL at 08:26

## 2024-10-26 RX ADMIN — ACETAMINOPHEN 975 MG: 325 TABLET, FILM COATED ORAL at 13:38

## 2024-10-26 RX ADMIN — SENNOSIDES 17.2 MG: 8.6 TABLET, FILM COATED ORAL at 21:04

## 2024-10-26 RX ADMIN — METOPROLOL SUCCINATE 50 MG: 50 TABLET, EXTENDED RELEASE ORAL at 08:26

## 2024-10-26 RX ADMIN — LIDOCAINE 1 PATCH: 50 PATCH CUTANEOUS at 08:26

## 2024-10-26 RX ADMIN — TORSEMIDE 30 MG: 20 TABLET ORAL at 11:33

## 2024-10-26 RX ADMIN — DOCUSATE SODIUM 100 MG: 100 CAPSULE, LIQUID FILLED ORAL at 17:12

## 2024-10-26 RX ADMIN — AMIKACIN SULFATE 850 MG: 250 INJECTION, SOLUTION INTRAMUSCULAR; INTRAVENOUS at 13:38

## 2024-10-26 RX ADMIN — PANTOPRAZOLE SODIUM 40 MG: 40 TABLET, DELAYED RELEASE ORAL at 08:26

## 2024-10-26 RX ADMIN — SUCRALFATE 1 G: 1 TABLET ORAL at 06:07

## 2024-10-26 RX ADMIN — ACETAMINOPHEN 975 MG: 325 TABLET, FILM COATED ORAL at 21:04

## 2024-10-26 RX ADMIN — OXYCODONE HYDROCHLORIDE 10 MG: 10 TABLET ORAL at 11:35

## 2024-10-26 RX ADMIN — BACLOFEN 10 MG: 10 TABLET ORAL at 01:28

## 2024-10-26 RX ADMIN — HEPARIN SODIUM 5000 UNITS: 5000 INJECTION INTRAVENOUS; SUBCUTANEOUS at 13:38

## 2024-10-26 RX ADMIN — BACLOFEN 10 MG: 10 TABLET ORAL at 21:11

## 2024-10-26 NOTE — PLAN OF CARE
Problem: PHYSICAL THERAPY ADULT  Goal: Performs mobility at highest level of function for planned discharge setting.  See evaluation for individualized goals.  Description: Treatment/Interventions: Functional transfer training, LE strengthening/ROM, Therapeutic exercise, Endurance training, Cognitive reorientation, Bed mobility, Gait training, Spoke to nursing, OT  Equipment Recommended: Walker       See flowsheet documentation for full assessment, interventions and recommendations.  Note: Prognosis: Fair  Problem List: Decreased strength, Decreased endurance, Impaired balance, Decreased mobility, Decreased cognition, Obesity  Assessment: Pt is 79 y.o. female admitted from SNF with concerns for acute cystitis with outpatient urine culture concerning for ESBL Klebsiella pneumonia; undergoing w/u. Pt 's comorbidities affecting POC include: Anemia, Anxiety, Arthritis, Asthma, Chronic narcotic dependence (HCC), Chronic pain, Diabetes mellitus (HCC), Dizziness, Fibromyalgia, Hypertension, Post laminectomy syndrome, S/P insertion of spinal cord stimulator, s/p Medtronic loop recorder 3/2/2024, Spinal stenosis, and Stroke and personal factors of: advanced age and being in rehab. Pt's clinical presentation is currently  unstable/unpredictable which is evident in ongoing telem monitoring, abn lab values and  tolerance to only 2 feet of ambulation at bedside w/ (A)x 1-2 required for OOB mobility. Pt presents w/ mod overall weakness, incl decreased LE strength, decreased endurance, decreased activity tolerance, impaired balance, gait deviations, and fall risk. Will cont to follow pt in PT for progressive mobilization to address above functional deficits and to max level of (I), endurance, and safety. D/C recommendations are outlined below. Will cont to follow until then.        Rehab Resource Intensity Level, PT: II (Moderate Resource Intensity)    See flowsheet documentation for full assessment.

## 2024-10-26 NOTE — OCCUPATIONAL THERAPY NOTE
Occupational Therapy Evaluation     Patient Name: Mattie Varela  Today's Date: 10/26/2024  Problem List  Principal Problem:    Klebsiella cystitis  Active Problems:    Degenerative lumbar spinal stenosis    Type 2 diabetes mellitus with other skin complications (HCC)    Chronic pain disorder    Hypertension    Hypothyroidism    Cerebrovascular accident (CVA), unspecified mechanism (HCC)    Chronic obstructive pulmonary disease, unspecified COPD type ([facility])    Stage 3a chronic kidney disease (HCC)    Chronic heart failure with preserved ejection fraction (HFpEF) ([facility])    Past Medical History  Past Medical History:   Diagnosis Date    Acid reflux     Acute kidney injury (HCC)     Anemia     hx of iron-deficient    Anxiety     Arthritis     Asthma     last needed inhaler last year 2020    Basal cell carcinoma     upper lip    Chronic narcotic dependence (HCC)     Chronic pain     Colon polyp     Cystocele     Diabetes mellitus (HCC)     stable    Disease of thyroid gland     hypothyroidism    Diverticulosis     Dizziness     at times    Dysfunctional uterine bleeding     last assessed - [id number]    Dysphagia     Fibromyalgia     Gastric ulcer     Gastroparesis     History of colonic polyps     last assessed - [id number]    History of gastroesophageal reflux (GERD)     Hypercholesterolemia     Hyperlipidemia     Hypertension     Hyponatremia     IBS (irritable bowel syndrome)     Pneumobilia 06/18/2022    Post laminectomy syndrome     S/P insertion of spinal cord stimulator 07/18/2018    s/p Medtronic loop recorder 3/2/2024 03/02/2024    Seasonal allergies     Shortness of breath     exertional    Spinal stenosis     Status post lumbar spinal fusion 03/16/2018    Stroke ([facility])     pt states slight stroke March 2022     Past Surgical History  Past Surgical History:   Procedure Laterality Date    APPENDECTOMY      BACK SURGERY      BREAST CYST EXCISION Left     CARDIAC CATHETERIZATION  11/14/2023    Procedure:  Cardiac catheterization;  Surgeon: Randolph Holt MD;  [facility];  Service: Cardiology    CARDIAC CATHETERIZATION N/A 11/14/2023    Procedure: Cardiac Coronary Angiogram;  Surgeon: Randolph Holt MD;  [facility];  Service: Cardiology    CARDIAC ELECTROPHYSIOLOGY PROCEDURE N/A 3/2/2024    Procedure: Cardiac loop recorder implant;  Surgeon: Edu Fontaine MD;  [facility];  Service: Cardiology    CHOLECYSTECTOMY      COLONOSCOPY      ESOPHAGOGASTRODUODENOSCOPY N/A 09/28/2016    Procedure: ESOPHAGOGASTRODUODENOSCOPY (EGD);  Surgeon: Mylene Moeller MD;  Location: [facility] GI [facility];  Service:     HERNIA REPAIR      HYSTERECTOMY      TTAH-BSO age 28    LAMINECTOMY      LUMBAR LAMINECTOMY      OOPHORECTOMY      age 28    IL ARTHRODESIS POSTERIOR/PSTLAT TQ 1NTRSPC THORACIC N/A 06/04/2018    Procedure: Reopening of lumbar incision for T12-L5 posterior instrumented fixation and fusion and T12-L4 posterior decompression;  Surgeon: Wood Rogel MD;  Location: [facility];  Service: Neurosurgery    IL COLONOSCOPY FLX DX W/COLLJ SPEC WHEN PFRMD N/A 03/02/2016    Procedure: EGD AND COLONOSCOPY;  Surgeon: Mylene Moeller MD;  Location: [facility];  Service: Gastroenterology    IL DILATION ESOPH UNGUIDED SOUND/BOUGIE 1/MULT PASS N/A 09/28/2016    Procedure: DILATATION ESOPHAGEAL;  Surgeon: Mylene Moeller MD;  Location: [facility];  Service: Gastroenterology    IL ESOPHAGOGASTRODUODENOSCOPY TRANSORAL DIAGNOSTIC N/A 07/18/2016    Procedure: ESOPHAGOGASTRODUODENOSCOPY (EGD);  Surgeon: Mylene Moeller MD;  [facility]: [facility];  Service: Gastroenterology    IL INSJ/RPLCMT SPINAL NPG/RCVR POCKET CRTJ&CONNJ Left 06/04/2018    Procedure: removal of left buttock implantable pulse generator and placement of new  implantable pulse generator;  Surgeon: Wood Rogel MD;  [facility]: [facility];  Service: Neurosurgery             10/26/24 1112   OT Last Visit   OT Visit Date 10/26/24   Note Type  "  Note type Evaluation   Pain Assessment   Pain Assessment Tool FLACC   Pain Location/Orientation Orientation: Left;Location: Back   Patient's Stated Pain Goal No pain   Hospital Pain Intervention(s) Repositioned;Ambulation/increased activity   Pain Rating: FLACC (Rest) - Face 1   Pain Rating: FLACC (Rest) - Legs 0   Pain Rating: FLACC (Rest) - Activity 0   Pain Rating: FLACC (Rest) - Cry 1   Pain Rating: FLACC (Rest) - Consolability 0   Score: FLACC (Rest) 2   Pain Rating: FLACC (Activity) - Face 1   Pain Rating: FLACC (Activity) - Legs 0   Pain Rating: FLACC (Activity) - Activity 0   Pain Rating: FLACC (Activity) - Cry 1   Pain Rating: FLACC (Activity) - Consolability 1   Score: FLACC (Activity) 3   Restrictions/Precautions   Other Precautions Cognitive;Chair Alarm;Bed Alarm;Multiple lines;Telemetry;Hard of hearing;Fall Risk;Pain   Home Living   Type of Home Assisted living   Home Layout One level   Bathroom Shower/Tub Walk-in shower   Bathroom Toilet Raised   Bathroom Equipment Grab bars in shower;Shower chair;Grab bars around toilet   Home Equipment Walker   Additional Comments Pt reports she has been at rehab at her [facility].   Prior Function   Level of [place] Independent with functional mobility;Needs assistance with ADLs;Independent with IADLS   Lives With Facility staff   Receives Help From Personal care attendant   IADLs Family/Friend/Other provides transportation;Family/Friend/Other provides meals;Family/Friend/Other provides medication management   Vocational Retired   Lifestyle   Autonomy Pta pt reports being at rehab at her [facility], prior to rehab was walking to the dining hood using rollator.   Reciprocal Relationships supportive facility staff   Service to Others retired   Intrinsic Gratification enjoys watching TV   Subjective   Subjective \"I have the purwick\"   ADL   Where Assessed Chair   Eating Assistance 5  Supervision/Setup   Grooming Assistance 4  Minimal Assistance   UB Bathing Assistance 3  " Moderate Assistance   LB Bathing Assistance 2  Maximal Assistance   UB Dressing Assistance 3  Moderate Assistance   LB Dressing Assistance 2  Maximal Assistance   Toileting Assistance  2  Maximal Assistance   Functional Assistance 3  Moderate Assistance   Bed Mobility   Supine to Sit 3  Moderate assistance   Additional items Assist x 2;Increased time required;Verbal cues;HOB elevated;LE management   Additional Comments Pt greeted in bed, left in chair with alarm on and all needs within reach.   Transfers   Sit to Stand 3  Moderate assistance   Additional items Assist x 1;Assist x 2;Increased time required;Verbal cues   Stand to Sit 3  Moderate assistance   Additional items Assist x 1;Increased time required;Verbal cues   Stand pivot 3  Moderate assistance  (bed>chair)   Additional items Assist x 1;Increased time required;Verbal cues   Additional Comments with RW, incontinent of urine during each STS requiring assist for cleanup and hygiene.   Functional Mobility   Functional Mobility 3  Moderate assistance   Additional Comments Pt takes a couple small steps bed>chair with MOD A x 1 with RW   Additional items Rolling walker   Balance   Static Sitting Fair -   Dynamic Sitting Poor +   Static Standing Poor   Dynamic Standing Poor   Ambulatory Poor   Activity Tolerance   Activity Tolerance Patient limited by fatigue;Patient limited by pain   Medical Staff Made Aware Co-eval with DPT due to high medical complexity   Nurse Made Aware RN cleared for therapy   RUE Assessment   RUE Assessment WFL   LUE Assessment   LUE Assessment WFL   Hand Function   Gross Motor Coordination Functional   Fine Motor Coordination Functional   Sensation   Light Touch No apparent deficits   Cognition   Overall Cognitive Status Impaired   Arousal/Participation Cooperative   Attention Attends with cues to redirect   Orientation Level Oriented to person;Oriented to place;Oriented to situation   Memory Decreased recall of biographical  information;Decreased recall of precautions;Decreased recall of recent events;Decreased short term memory   Following Commands Follows one step commands with increased time or repetition   Comments Pt cooperative to therapy although with decreased safety awareness, requiring vc for safe sequencing of transfers.   Assessment   Limitation Decreased ADL status;Decreased Safe judgement during ADL;Decreased endurance;Decreased self-care trans;Decreased high-level ADLs   Prognosis Fair   Assessment Pt is a 79 y.o. female who was admitted to [facility] on 10/25/2024 with Klebsiella cystitis. Pt seen for an OT evaluation per active OT orders.  Pt  has a past medical history of Acid reflux, Acute kidney injury ([facility]), Anemia, Anxiety, Arthritis, Asthma, Basal cell carcinoma, Chronic narcotic dependence ([facility]), Chronic pain, Colon polyp, Cystocele, Diabetes mellitus ([facility]), Disease of thyroid gland, Diverticulosis, Dizziness, Dysfunctional uterine bleeding, Dysphagia, Fibromyalgia, Gastric ulcer, Gastroparesis, History of colonic polyps, History of gastroesophageal reflux (GERD), Hypercholesterolemia, Hyperlipidemia, Hypertension, Hyponatremia, IBS (irritable bowel syndrome), Pneumobilia, Post laminectomy syndrome, S/P insertion of spinal cord stimulator, s/p Medtronic loop recorder 3/2/2024, Seasonal allergies, Shortness of breath, Spinal stenosis, Status post lumbar spinal fusion, and Stroke ([facility]). Pt lives in an [facility] although reports she was at rehab at [facility] prior to admission to get stronger, had assist for ADL and IADL. Currently, pt is Mod Ax1 for UB ADL, Max Ax1 for LB ADL, and completed transfers/short FM w Mod Ax1-2 with RW. Pt currently presents with impairments in the following categories -difficulty performing ADLS and limited insight into deficits activity tolerance, endurance, standing balance/tolerance, memory, insight, safety , judgement , attention , and sequencing . These impairments, as well as pt's  fatigue, pain, and risk for falls  limit pt's ability to safely engage in all baseline areas of occupation, includinggrooming, bathing, dressing, toileting, functional mobility/transfers, and community mobility.  The patient's raw score on the AM-PAC Daily Activity Inpatient Short Form is 14. A raw score of less than 19 suggests the patient may benefit from discharge to post-acute rehabilitation services. Please refer to the recommendation of the Occupational Therapist for safe discharge planning. Pt would benefit from continued acute OT services throughout hospital course and following D/C. Plan for OT interventions 2-3x per week. From OT standpoint, recommend level II services upon D/C. Pt was left seated in bedside chair with chair alarm on and all needs within reach.   Goals   Patient Goals to get OOB   Plan   Treatment Interventions ADL retraining;Functional transfer training;Cognitive reorientation;Endurance training;Continued evaluation;Energy conservation   Goal Expiration Date 11/09/24   OT Frequency 2-3x/wk   Discharge Recommendation   Rehab Resource Intensity Level, OT II (Moderate Resource Intensity)   AM-PAC Daily Activity Inpatient   Lower Body Dressing 2   Bathing 2   Toileting 2   Upper Body Dressing 2   Grooming 3   Eating 3   Daily Activity Raw Score 14   Daily Activity Standardized Score (Calc for Raw Score >=11) 33.39   AM-PAC Applied Cognition Inpatient   Following a Speech/Presentation 3   Understanding Ordinary Conversation 3   Taking Medications 3   Remembering Where Things Are Placed or Put Away 3   Remembering List of 4-5 Errands 2   Taking Care of Complicated Tasks 2   Applied Cognition Raw Score 16   Applied Cognition Standardized Score 35.03   Modified [place] Scale   Modified [place] Scale 4   End of Consult   Education Provided Yes   Patient Position at End of Consult Bedside chair;Bed/Chair alarm activated;All needs within reach   Nurse Communication Nurse aware of consult       OT  Goals    - Pt will be Min A  with LB ADL by end of hospital course.    - Pt will be Supervision with UB ADL by end of hospital course.    - Pt will be Min A  with all functional transfers required for patient safety by end of hospital course.    - Pt will be Min A  with functional mobility to/from bathroom for increased independence with toileting tasks.    - Pt will independently recall and implement safety precautions during OT sessions.     - Pt activity tolerance will increase to 30 minutes in order to safely engage in ADL and transfers.     - Pt standing tolerance will increase to 3 minutes  in order to promote sink side ADLs and IADL activities.    - Pt will consistently follow one-step directions with min to no vc or prompting.     - Pt will attend to functional tasks for 10 minutes with min to no vc for attention/redirection.    - Pt will attend to and complete ongoing cognitive assessment in order to inform safe discharge planning.          DENISE Noel, OTR/L

## 2024-10-26 NOTE — ASSESSMENT & PLAN NOTE
Wt Readings from Last 3 Encounters:   10/18/24 74.3 kg (163 lb 12.8 oz)   10/02/24 85.5 kg (188 lb 9.6 oz)   09/26/24 86.2 kg (190 lb)   Patient recently admitted at [facility] for acute exacerbation of this improved with IV diuretics, discharged on torsemide 20 mg daily  Continue torsemide 30 mg daily

## 2024-10-26 NOTE — ASSESSMENT & PLAN NOTE
Present on admission, patient presenting from SNF with indwelling [place] additionally with complaints of suprapubic tenderness/urinary urgency/burning with urination.  Patient otherwise afebrile and nontoxic-appearing  Outpatient urinalysis concerning for acute cystitis, with culture revealing sensitivity only to ertapenem/meropenem.  Sent here for IV antibiotic treatment  IV ertapenem started during ED evaluation, continue for now.  Can discuss with infectious disease service during daytime hours length of treatment  Urine culture sent during ED evaluation, will follow-up final culture once available  Trend WBC, temperature curve, hemodynamics

## 2024-10-26 NOTE — ASSESSMENT & PLAN NOTE
Wt Readings from Last 3 Encounters:   10/18/24 74.3 kg (163 lb 12.8 oz)   10/02/24 85.5 kg (188 lb 9.6 oz)   09/26/24 86.2 kg (190 lb)

## 2024-10-26 NOTE — H&P
H&P - Hospitalist   Name: Mattie Varela 79 y.o. female I MRN: [id number]  Unit/Bed#: ED 24 I Date of Admission: 10/25/2024   Date of Service: 10/25/2024 I Hospital Day: 0     Assessment & Plan  Klebsiella cystitis  Present on admission, patient presenting from SNF with indwelling Bateman additionally with complaints of suprapubic tenderness/urinary urgency/burning with urination.  Patient otherwise afebrile and nontoxic-appearing  Outpatient urinalysis concerning for acute cystitis, with culture revealing sensitivity only to ertapenem/meropenem.  Sent here for IV antibiotic treatment  IV ertapenem started during ED evaluation, continue for now.  Can discuss with infectious disease service during daytime hours length of treatment  Urine culture sent during ED evaluation, will follow-up final culture once available  Trend WBC, temperature curve, hemodynamics  Chronic heart failure with preserved ejection fraction (HFpEF) ([facility])  Wt Readings from Last 3 Encounters:   10/18/24 74.3 kg (163 lb 12.8 oz)   10/02/24 85.5 kg (188 lb 9.6 oz)   09/26/24 86.2 kg (190 lb)   Patient recently admitted at [facility] for acute exacerbation of this improved with IV diuretics, discharged on torsemide 20 mg daily  Patient denying shortness of breath but does report some increased leg edema, from facility records appears torsemide was to be increased to 30 mg daily, will trial this and assess response.  Continue remainder of cardiac medications and monitor volume status closely          Degenerative lumbar spinal stenosis  S/p spinal stimulator although per family no longer working  Continue baclofen and Percocet as needed  Type 2 diabetes mellitus with other skin complications ([facility])    Lab Results   Component Value Date    HGBA1C 7.4 (H) 10/12/2024   Hold home metformin while admitted, start correctional insulin coverage with Accu-Cheks  Carb controlled diet  Initiate hypoglycemia protocol  Chronic pain disorder  PDMP and facility  paperwork reviewed, patient on Percocet 10/325 mg as needed, continue home dosing  Hypertension  Blood pressure acceptable, continue PTA metoprolol succinate 50 mg twice daily and torsemide 20 mg daily with strict hold parameters and monitor blood pressure per protocol  Hypothyroidism  Chronic and stable, continue home dose levothyroxine  Cerebrovascular accident (CVA), unspecified mechanism (HCC)  No new focal deficits appreciated, continue ASA/statin  Chronic obstructive pulmonary disease, unspecified COPD type (HCC)  Not in acute exacerbation, continue as needed albuterol inhaler  Stage 3a chronic kidney disease (HCC)  Lab Results   Component Value Date    EGFR 36 10/25/2024    EGFR 37 10/19/2024    EGFR 27 10/18/2024    CREATININE 1.37 (H) 10/25/2024    CREATININE 1.34 (H) 10/19/2024    CREATININE 1.70 (H) 10/18/2024   Baseline creatinine appearing 1.1-1.3, renal function currently near baseline.  Monitor with BMP  Avoid/limit nephrotoxins and avoid hypotension, renally dose medications as needed      VTE Prophylaxis: Heparin  / sequential compression device   Code Status: Level 1 - Full Code per patient  POLST: POLST form is not discussed and not completed at this time.  Discussion with family: Updated daughter Venice via telephone    Anticipated Length of Stay:  Patient will be admitted on an Inpatient basis with an anticipated length of stay of greater than 2 midnights.   Justification for Hospital Stay: Please see detailed plans noted above.    Chief Complaint:     Acute cystitis, outpatient urine culture with ESBL Klebsiella pneumonia  History of Present Illness:  Mattie Varela is a 79 y.o. female who has a past medical history significant for chronic HFpEF, degenerative lumbar spinal stenosis with chronic pain disorder maintained on as needed baclofen and Percocet, hypertension, type 2 diabetes, COPD, history of prior CVA, chronic kidney disease stage IIIa who presents from SNF with concerns for  acute cystitis with outpatient urine culture concerning for ESBL Klebsiella pneumonia.  Patient was recently admitted to the [facility] 10/11/2024 - 10/19/2024 with acute on chronic HFpEF improved with IV diuresis, unfortunately developed urinary retention requiring Bateman catheter placement and urology consultation and ultimately was advised to maintain Bateman catheter through discharge with outpatient voiding trial.  Reportedly 2 days prior to his presentation patient developed hematuria for which Bateman catheter was removed and urinalysis/urine culture sent for testing.  Patient subsequently noted suprapubic discomfort additionally with burning with urination and some incontinence with chills but without fever, other abdominal pain/nausea/vomiting/diarrhea, flank pain, or other new systemic symptoms.  On the day of presentation her culture resulted revealing ESBL Klebsiella pneumonia with greater than 100,000 CFU's resistant to all but ertapenem and meropenem and ultimately sent here for treatment of this.    Throughout ED evaluation patient remained afebrile and hemodynamically stable.  Urine culture was obtained and patient started on ertapenem and admitted for further management of the Klebsiella cystitis with multiple antibiotic resistances.    Review of Systems:    Constitutional:  Denies fever reported chills  Eyes:  Denies change in visual acuity   HENT:  Denies nasal congestion or sore throat   Respiratory:  Denies cough or shortness of breath   Cardiovascular:  Denies chest pain but reported edema  GI:  Denies nausea, vomiting, bloody stools or diarrhea but reports suprapubic discomfort  : Reported dysuria and urinary urgency  Musculoskeletal:  Denies back pain or joint pain   Integument:  Denies rash   Neurologic:  Denies headache, focal weakness or sensory changes   Endocrine:  Denies polyuria or polydipsia   Lymphatic:  Denies swollen glands   Psychiatric:  Denies depression or anxiety      Past Medical and Surgical History:   Past Medical History:   Diagnosis Date    Acid reflux     Acute kidney injury (HCC)     Anemia     hx of iron-deficient    Anxiety     Arthritis     Asthma     last needed inhaler last year 2020    Basal cell carcinoma     upper lip    Chronic narcotic dependence (HCC)     Chronic pain     Colon polyp     Cystocele     Diabetes mellitus (HCC)     stable    Disease of thyroid gland     hypothyroidism    Diverticulosis     Dizziness     at times    Dysfunctional uterine bleeding     last assessed - [id number]    Dysphagia     Fibromyalgia     Gastric ulcer     Gastroparesis     History of colonic polyps     last assessed - [id number]    History of gastroesophageal reflux (GERD)     Hypercholesterolemia     Hyperlipidemia     Hypertension     Hyponatremia     IBS (irritable bowel syndrome)     Pneumobilia 06/18/2022    Post laminectomy syndrome     S/P insertion of spinal cord stimulator 07/18/2018    s/p [employer] loop recorder 3/2/2024 03/02/2024    Seasonal allergies     Shortness of breath     exertional    Spinal stenosis     Status post lumbar spinal fusion 03/16/2018    Stroke (HCC)     pt states slight stroke March 2022     Past Surgical History:   Procedure Laterality Date    APPENDECTOMY      BACK SURGERY      BREAST CYST EXCISION Left     CARDIAC CATHETERIZATION  11/14/2023    Procedure: Cardiac catheterization;  Surgeon: Randolph Holt MD;  [facility];  Service: Cardiology    CARDIAC CATHETERIZATION N/A 11/14/2023    Procedure: Cardiac Coronary Angiogram;  Surgeon: Randolph Holt MD;  [facility];  Service: Cardiology    CARDIAC ELECTROPHYSIOLOGY PROCEDURE N/A 3/2/2024    Procedure: Cardiac loop recorder implant;  Surgeon: Edu Fontaine MD;  Location: [facility];  Service: Cardiology    CHOLECYSTECTOMY      COLONOSCOPY      ESOPHAGOGASTRODUODENOSCOPY N/A 09/28/2016    Procedure: ESOPHAGOGASTRODUODENOSCOPY (EGD);   Surgeon: Mylene Moeller MD;  Location: [facility] GI [facility];  Service:     HERNIA REPAIR      HYSTERECTOMY      TTAH-BSO age 28    LAMINECTOMY      LUMBAR LAMINECTOMY      OOPHORECTOMY      age 28    DE ARTHRODESIS POSTERIOR/PSTLAT TQ 1NTRSP THORACIC N/A 06/04/2018    Procedure: Reopening of lumbar incision for T12-L5 posterior instrumented fixation and fusion and T12-L4 posterior decompression;  Surgeon: Wood Rogel MD;  Location: [facility];  Service: Neurosurgery    DE COLONOSCOPY FLX DX W/COLLJ SPEC WHEN PFRMD N/A 03/02/2016    Procedure: EGD AND COLONOSCOPY;  Surgeon: Mylene Moeller MD;  Location: [facility];  Service: Gastroenterology    DE DILATION ESOPH UNGUIDED SOUND/BOUGIE 1/MULT PASS N/A 09/28/2016    Procedure: DILATATION ESOPHAGEAL;  Surgeon: Mylene Moeller MD;  Location: [facility];  Service: Gastroenterology    DE ESOPHAGOGASTRODUODENOSCOPY TRANSORAL DIAGNOSTIC N/A 07/18/2016    Procedure: ESOPHAGOGASTRODUODENOSCOPY (EGD);  Surgeon: Mylene Moeller MD;  Location: [facility];  Service: Gastroenterology    DE INSJ/RPLCMT SPINAL NPG/RCVR POCKET CRTJ&CONNJ Left 06/04/2018    Procedure: removal of left buttock implantable pulse generator and placement of new  implantable pulse generator;  Surgeon: Wood oRgel MD;  Location: [facility];  Service: Neurosurgery       Meds/Allergies:  Current Outpatient Medications   Medication Instructions    acetaminophen (TYLENOL) 975 mg, Oral, Every 8 hours scheduled    albuterol (PROVENTIL HFA,VENTOLIN HFA) 90 mcg/act inhaler 2 puffs, Inhalation, Every 6 hours PRN    aluminum-magnesium hydroxide-simethicone (MAALOX) 3154-9935-658 mg/30 mL suspension 30 mL, Oral, Every 6 hours PRN    aspirin 81 mg, Oral, Daily    atorvastatin (LIPITOR) 40 mg tablet TAKE ONE TABLET BY MOUTH ONCE DAILY    baclofen 10 mg, Oral, 2 times daily PRN    Blood Glucose Monitoring Suppl (OneTouch Verio Reflect) w/Device KIT Check blood sugars twice daily. Please substitute with appropriate alternative as covered  by patient's insurance. Dx: E11.65    docusate sodium (COLACE) 100 mg, Oral, 2 times daily    ferrous sulfate 325 mg, Oral, Daily with breakfast    glucose blood (OneTouch Verio) test strip Check blood sugars twice daily. Please substitute with appropriate alternative as covered by patient's insurance. Dx: E11.65    latanoprost (XALATAN) 0.005 % ophthalmic solution 1 drop, Both Eyes, Daily at bedtime    levothyroxine 37.5 mcg, Oral, Daily (early morning)    lidocaine (LIDODERM) 5 % 1 patch, Topical, Daily, Remove & Discard patch within 12 hours or as directed by MD    MAGNESIUM OXIDE 400  mg, Oral, 2 times daily    meclizine (ANTIVERT) 25 mg, Oral, 4 times daily PRN    metFORMIN (GLUCOPHAGE) 500 mg, Oral, 2 times daily with meals    metoprolol succinate (TOPROL-XL) 50 mg, Oral, Every 12 hours scheduled    OneTouch Delica Lancets 33G MISC Check blood sugars twice daily. Please substitute with appropriate alternative as covered by patient's insurance. Dx: E11.65    pantoprazole (PROTONIX) 40 mg, Oral, Daily    polyethylene glycol (MIRALAX) 17 g, Oral, Daily    Savella 100 mg, Oral, Daily    senna (SENOKOT) 17.2 mg, Oral, Daily at bedtime    sucralfate (CARAFATE) 1 g, Oral, 2 times daily before meals    torsemide (DEMADEX) 20 mg, Oral, Daily    zolpidem (AMBIEN) 5 mg, Oral, Daily at bedtime PRN         Allergies:   Allergies   Allergen Reactions    Penicillins Anaphylaxis, Hives and Other (See Comments)     Other reaction(s): Unknown Reaction    Sulfa Antibiotics Anaphylaxis and Other (See Comments)     Other reaction(s): Unknown Reaction    Aspartame - Food Allergy Other (See Comments) and Hypertension     Slurred speech, weakness, stroke sx    Iodinated Contrast Media Hives     Pt has taken prep prior for contrast and has not had any break through reaction    Keflex [Cephalexin] Hives     History:  Marital Status:      Substance Use History:   Social History     Substance and Sexual Activity   Alcohol  Use Not Currently    Comment: Denied history of alcohol use     Social History     Tobacco Use   Smoking Status Former    Current packs/day: 0.00    Types: Cigarettes    Quit date: 1970    Years since quittin.8   Smokeless Tobacco Never   Tobacco Comments    Denied history of current ever day smoker, Former smoker and Never smoker all documented in [employer]     Social History     Substance and Sexual Activity   Drug Use No    Comment: Denied history of drug use       Family History:  Family History   Problem Relation Age of Onset    Lung cancer Mother 46    Pulmonary embolism Father     No Known Problems Sister     No Known Problems Daughter     No Known Problems Daughter     Stroke Maternal Grandmother     Heart attack Maternal Grandfather     No Known Problems Paternal Grandmother     No Known Problems Paternal Grandfather     No Known Problems Maternal Aunt     Diabetes Family         Diabetes mellitus    Hypertension Family     Stroke Family         Stroke complications       Physical Exam:     Vitals:   Blood Pressure: 151/76 (10/25/24 1930)  Pulse: 82 (10/25/24 1930)  Temperature: 98.3 °F (36.8 °C) (10/25/24 1927)  Temp Source: Oral (10/25/24 1927)  Respirations: 16 (10/25/24 1930)  SpO2: 97 % (10/25/24 1930)    Constitutional:  Well developed, well nourished, no acute distress, non-toxic appearance   Eyes:  PERRL, conjunctiva normal   HENT:  Atraumatic, external ears normal, nose normal, oropharynx moist, no pharyngeal exudates. Neck- normal range of motion, no tenderness, supple   Respiratory:  No respiratory distress, normal breath sounds, no rales, no wheezing   Cardiovascular:  Normal rate, normal rhythm, no murmurs, no gallops, no rubs   GI:  Soft, nondistended, normal bowel sounds, mild suprapubic discomfort to palpation, no organomegaly, no mass, no rebound, no guarding   :  No costovertebral angle tenderness   Musculoskeletal: Bilateral lower extremity edema, no tenderness, no  deformities. Back- no tenderness  Integument:  Well hydrated, bilateral venous stasis dermatitis  Lymphatic:  No lymphadenopathy noted   Neurologic:  Alert &awake, communicative, CN 2-12 normal, normal motor function, normal sensory function, no focal deficits noted   Psychiatric:  Speech and behavior appropriate       Lab Results: I have reviewed the below Adlee results.  Additionally I have reviewed printout of lab results provided from facility including urinalysis/urine culture/BMP/CBC    Results from last 7 days   Lab Units 10/25/24  2000   WBC Thousand/uL 8.37   HEMOGLOBIN g/dL 11.0*   HEMATOCRIT % 35.1   PLATELETS Thousands/uL 327   SEGS PCT % 61   LYMPHO PCT % 21   MONO PCT % 8   EOS PCT % 8*     Results from last 7 days   Lab Units 10/25/24  2000   SODIUM mmol/L 135   POTASSIUM mmol/L 4.0   CHLORIDE mmol/L 94*   CO2 mmol/L 32   BUN mg/dL 36*   CREATININE mg/dL 1.37*   ANION GAP mmol/L 9   CALCIUM mg/dL 9.5   GLUCOSE RANDOM mg/dL 191*         Results from last 7 days   Lab Units 10/19/24  1054 10/19/24  0706   POC GLUCOSE mg/dl 268* 149*               Imaging: I have reviewed the below imaging reports    XR chest portable    Result Date: 10/19/2024  Narrative: XR CHEST PORTABLE INDICATION: Chest pain. COMPARISON: CXR and chest CT 10/11/2024. CXR 12/05/2019. FINDINGS: Low lung volumes producing vascular crowding. No pneumothorax or pleural effusion. Normal cardiomediastinal silhouette. Loop recorder in the left chest wall. Bones are unremarkable for age. Thoracolumbar fusion. Spinal cord stimulator. Upper abdomen normal. Cholecystectomy.     Impression: No acute cardiopulmonary disease. Low lung volumes producing vascular crowding. Workstation performed: [id number]     CT spine lumbar wo contrast    Result Date: 10/13/2024  Narrative: CT LUMBAR SPINE WITHOUT CONTRAST INDICATION:   low back pain. COMPARISON: 11/29/2023 TECHNIQUE:  Contiguous axial images through the lumbar spine were obtained. Sagittal and  coronal reconstructions were performed. IV Contrast: Radiation dose length product (DLP) for this visit:  748 mGy-cm .  This examination, like all CT scans performed in the [facility], was performed utilizing techniques to minimize radiation dose exposure, including the use of iterative reconstruction and automated exposure control. IMAGE QUALITY: Beam-hardening artifact from spinal construct limits evaluation FINDINGS: ALIGNMENT:  There are 5 lumbar type vertebral bodies. Kyphotic angulation of the thoracolumbar junction redemonstrated. VERTEBRAE:  No fracture.  No lytic or blastic lesion. Extensive postoperative changes redemonstrated with bilateral pedicle screws T12-L5. Metallic intervertebral cage at L4-5. Decompressive laminectomies L1-L4 redemonstrated DEGENERATIVE CHANGES: Extensive beam-hardening artifact limits evaluation. Scattered moderate multilevel spondylotic changes noted. Spinal construct intact. PARASPINAL SOFT TISSUES: Atherosclerotic calcifications noted. Cholecystectomy clips noted. Rounded calcific radiodensity versus medication measuring 1 cm in the gastric pylorus incidentally noted. OTHER: None.     Impression: 1. No acute fracture. 2. Extensive postoperative and spondylotic changes are similar to the prior study. Workstation performed: [id number]     CT chest abdomen pelvis wo contrast    Result Date: 10/11/2024  Narrative: CT CHEST, ABDOMEN AND PELVIS WITHOUT IV CONTRAST INDICATION: Right-sided chest pain beginning last night.  Bilateral flank pain, diffuse abdominal pain.  SADAF on labs, states she's retaining urine.. COMPARISON: None. TECHNIQUE: CT examination of the chest, abdomen and pelvis was performed without intravenous contrast. Multiplanar 2D reformatted images were created from the source data. Study is limited due to streak artifacts from orthopedic hardware. This examination, like all CT scans performed in the [facility], was performed utilizing  techniques to minimize radiation dose exposure, including the use of iterative reconstruction and automated exposure control. Radiation dose length product (DLP) for this visit: 655 mGy-cm Enteric Contrast: Not administered. FINDINGS: CHEST LUNGS: No tracheal or endobronchial lesion. A few scattered nodules less than 4 mm in size are not significantly changed and may represent small granulomas. Right lower lobe scarring adjacent to osteophyte is similar to prior study. PLEURA: Unremarkable. HEART/GREAT VESSELS: Heart is unremarkable for patient's age. No thoracic aortic aneurysm. Coronary calcifications are present. MEDIASTINUM AND AYLA: Unremarkable. CHEST WALL AND LOWER NECK: Eggshell calcification left breast is unchanged. ABDOMEN LIVER/BILIARY TREE: Unremarkable. GALLBLADDER: Post cholecystectomy. SPLEEN: Unremarkable. PANCREAS: Unremarkable. ADRENAL GLANDS: Unremarkable. KIDNEYS/URETERS: No hydronephrosis or evidence of calculi. No definite ureteral stone. STOMACH AND BOWEL: Stomach is distended with food debris and radiopaque tablet. Small bowel is not dilated less pronounced than the prior study. Some redundant jejunum does extend into the right upper quadrant although the SMV appears to be to the right of the SMA. Stool scattered throughout the colon left colon diverticulosis without CT evidence of diverticulitis. APPENDIX: No findings to suggest appendicitis. ABDOMINOPELVIC CAVITY: No ascites. No pneumoperitoneum. No lymphadenopathy. VESSELS: Unremarkable for patient's age. PELVIS REPRODUCTIVE ORGANS: Unremarkable for patient's age. URINARY BLADDER: The bladder is distended with air-fluid level should be correlated with any recent instrumentation.. ABDOMINAL WALL/INGUINAL REGIONS: There is a right lateral hernia seen at the pelvic inlet containing fat and limited by the external oblique muscle. Small bowel loops enter the internal inguinal ring on the right although a large hernia is not seen distally.  BONES: No acute fracture or suspicious osseous lesion. Fusion hardware is noted in the lumbar spine causing streak artifacts which limit the examination. Pedicle screws extend from the T12 level to the L5 level. Disc prosthesis is noted at L4-L5. Stimulator device extends into the thoracic spinal canal with generator noted in the left flank region.     Impression: No evidence of consolidation or pleural effusion. No evidence of hydronephrosis or stone. Bladder distention with air-fluid level should be correlated with urinalysis and any recent instrumentation. Correlation with any voiding dysfunction is advised. Bateman catheter may be useful. No small bowel dilatation. The study is limited due to extensive streak artifacts from orthopedic hardware. This was discussed with Lamonte GAMBOA at 3:23 p.m. Workstation performed: [id number]     XR chest 1 view portable    Result Date: 10/11/2024  Narrative: XR CHEST PORTABLE INDICATION: Chest pain. Midsternal chest pain that radiates into the right side. Symptoms began last night. Weakness.. COMPARISON: 5/22/2024; 1/13/2024; 12/17/2004; 3/29/2018 FINDINGS: Clear lungs. No pneumothorax or pleural effusion. Unremarkable cardiomediastinal silhouette.  Atherosclerotic vascular calcifications noted. Right diaphragmatic eventration redemonstrated. Spinal cord stimulator over the midthoracic spine redemonstrated. Partially imaged pedicle screws noted at the thoracolumbar junction. Loop recorder redemonstrated. Normal upper abdomen.     Impression: No acute cardiopulmonary disease. Workstation performed: [id number]         ** Please Note: Dragon 360 Dictation voice to text software was used in the creation of this document. **      ** Please Note: This note has been constructed using a voice recognition system. **

## 2024-10-26 NOTE — CASE MANAGEMENT
Case Management Assessment & Discharge Planning Note    Patient name Mattie Varela  Location /-01 MRN [id number]  : 1945 Date 10/26/2024       Current Admission Date: 10/25/2024  Current Admission Diagnosis:Klebsiella cystitis   Patient Active Problem List    Diagnosis Date Noted Date Diagnosed    Chronic heart failure with preserved ejection fraction (HFpEF) ([facility]) 10/25/2024     Klebsiella cystitis 10/25/2024     Acute kidney injury superimposed on chronic kidney disease  ([facility]) 10/18/2024     Acute low back pain 10/13/2024     Chest pain 10/11/2024     Acute cystitis 10/11/2024     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     Generalized edema 2024     Constipation 2024     Leg pain, right 2024     Acute on chronic diastolic congestive heart failure ([facility]) 2024     s/p Medtronic loop recorder 3/2/2024 2024     Moderate protein-calorie malnutrition ([facility]) 2024     Hypertensive urgency 2024     AMS (altered mental status) 2024     Encounter for examination for admission to nursing home 2024     Stage 3a chronic kidney disease ([facility]) 01/10/2024     Left ventricular outflow obstruction 2024     Obesity, Class I, BMI 30-34.9 2024     Urinary retention 2023     SADAF (acute kidney injury) ([facility]) 2023     Exertional shortness of breath 2023     Non obstructive CAD 11/15/2023     History of lumbar surgery 11/10/2023     Abnormal urinalysis 2023     Chronic obstructive pulmonary disease, unspecified COPD type ([facility]) 2023     Confusion with body shakes and blank stare 2023     Anemia in stage 3a chronic kidney disease  ([facility]) 2023     Lower extremity edema 2023     Cerebrovascular accident (CVA), unspecified mechanism ([facility]) 2022     Stroke-like symptoms 2022     Prepyloric ulcer 2021     Diarrhea 2021     Mild intermittent asthma without complication 2020     S/P  insertion of spinal cord stimulator 07/18/2018     Iron deficiency anemia secondary to inadequate dietary iron intake 06/19/2018     Type 2 diabetes mellitus with other skin complications (H[facility])      Failed back surgical syndrome 03/16/2018     Hypothyroidism 11/20/2017     Degenerative lumbar spinal stenosis 01/25/2017     Glaucoma 01/06/2017     Overactive bladder 08/04/2016     Dyspepsia 02/09/2016     Hypercholesterolemia 02/09/2016     Anxiety 02/09/2015     Chronic pain disorder 12/18/2013     Hypertension 06/12/2013       LOS (days): 1  Geometric Mean LOS (GMLOS) (days):   Days to GMLOS:     OBJECTIVE:  PATIENT READMITTED TO HOSPITAL  Risk of Unplanned Readmission Score: 36.16         Current admission status: Inpatient       Preferred Pharmacy:   [facility] [address]  [address]  [address] [address]  Phone: [phone] Fax: [phone]    [facility] [place], [place] - [address]  [facility],  [address]  [facility],  First Floor [facility] [address]  Phone: [phone] Fax: [phone]    [facility] - [place], [place] - [address]  [address]  [place] [address]  Phone: [phone] Fax: [phone]    Primary Care Provider: NANY Pollock    Primary Insurance: [facility]  Secondary Insurance:     ASSESSMENT:  Active Health Care Proxies       Venice Biares Health Care Representative - Daughter   Primary Phone: [phone] (Mobile)  Home Phone: [phone]                 Advance Directives  Does patient have a Health Care POA?: Yes  Does patient have Advance Directives?: Yes  Advance Directives: Living will, Power of  for health care  Primary Contact: daughterVenice    Readmission Root Cause  30 Day Readmission: Yes  During your hospital stay, did someone (provider, nurse, ) explain your care to you in a way you could understand?: Yes  Did you feel medically stable to leave  the hospital?: Yes  Were you able to pay for your medication at the pharmacy?: Yes  Did you have reliable transportation to take you to your appointments?: Yes  During previous admission, was a post-acute recommendation made?: Yes  What post-acute resources were offered?: STR  Patient was readmitted due to: infection  Action Plan: likely return to STR    Patient Information  Admitted from:: Facility (was at [facility] for STR, came from [facility])  Mental Status: Alert  During Assessment patient was accompanied by: Not accompanied during assessment  Assessment information provided by:: Patient  Primary Caregiver: Self  Support Systems: Self, Daughter, Children  Home entry access options. Select all that apply.: No steps to enter home  Type of Current Residence: Facility ([facility])  Floor Level: 2  Upon entering residence, is there a bedroom on the main floor (no further steps)?: Yes  Upon entering residence, is there a bathroom on the main floor (no further steps)?: Yes  Is patient a ?: No    Activities of Daily Living Prior to Admission  Functional Status: Assistance  Completes ADLs independently?: No  Level of ADL dependence: Assistance  Ambulates independently?: Yes  Does patient use assisted devices?: Yes  Assisted Devices (DME) used: Walker  Does patient have a history of Outpatient Therapy (PT/OT)?: No  Does the patient have a history of Short-Term Rehab?: Yes ([facility])  Does patient have a history of HHC?: Yes (through [facility])  Does patient currently have HHC?: Yes    Patient Information Continued  Income Source: Pension/nursing home  Does patient have prescription coverage?: Yes  Does patient receive dialysis treatments?: No  Does patient have a history of substance abuse?: No  Does patient have a history of Mental Health Diagnosis?: No    DISCHARGE DETAILS:    Discharge planning discussed with:: Patient  Freedom of Choice: Yes  Comments - Freedom of Choice: Discussed FOC     Were  Treatment Team discharge recommendations reviewed with patient/caregiver?: Yes  Did patient/caregiver verbalize understanding of patient care needs?: N/A- going to facility  Were patient/caregiver advised of the risks associated with not following Treatment Team discharge recommendations?: Yes    Contacts  Patient Contacts: daughter, Dr. Venice Baires  Relationship to Patient:: Family  Contact Method: Phone  Phone Number: [phone]  Reason/Outcome: Discharge Planning, Continuity of Care, Emergency Contact    This cM introduced self and role to patient and daughter.  Patient was living at [facility] prior to previous admission.  Patient needed assistance with medication administration, supervision with bathing, and meal prep.  Patient was recently at [facility] for STR (PT/OT requested to order) (daughter would like patient to return to [facility] not [facility]).

## 2024-10-26 NOTE — PLAN OF CARE
Problem: OCCUPATIONAL THERAPY ADULT  Goal: Performs self-care activities at highest level of function for planned discharge setting.  See evaluation for individualized goals.  Description: Treatment Interventions: ADL retraining, Functional transfer training, Cognitive reorientation, Endurance training, Continued evaluation, Energy conservation          See flowsheet documentation for full assessment, interventions and recommendations.   Outcome: Progressing  Note: Limitation: Decreased ADL status, Decreased Safe judgement during ADL, Decreased endurance, Decreased self-care trans, Decreased high-level ADLs  Prognosis: Fair  Assessment: Pt is a 79 y.o. female who was admitted to [facility] on 10/25/2024 with Klebsiella cystitis. Pt seen for an OT evaluation per active OT orders.  Pt  has a past medical history of Acid reflux, Acute kidney injury ([facility]), Anemia, Anxiety, Arthritis, Asthma, Basal cell carcinoma, Chronic narcotic dependence ([facility]), Chronic pain, Colon polyp, Cystocele, Diabetes mellitus ([facility]), Disease of thyroid gland, Diverticulosis, Dizziness, Dysfunctional uterine bleeding, Dysphagia, Fibromyalgia, Gastric ulcer, Gastroparesis, History of colonic polyps, History of gastroesophageal reflux (GERD), Hypercholesterolemia, Hyperlipidemia, Hypertension, Hyponatremia, IBS (irritable bowel syndrome), Pneumobilia, Post laminectomy syndrome, S/P insertion of spinal cord stimulator, s/p Medtronic loop recorder 3/2/2024, Seasonal allergies, Shortness of breath, Spinal stenosis, Status post lumbar spinal fusion, and Stroke ([facility]). Pt lives in an [facility] although reports she was at rehab at [facility] prior to admission to get stronger, had assist for ADL and IADL. Currently, pt is Mod Ax1 for UB ADL, Max Ax1 for LB ADL, and completed transfers/short FM w Mod Ax1-2 with RW. Pt currently presents with impairments in the following categories -difficulty performing ADLS and limited insight into deficits activity  tolerance, endurance, standing balance/tolerance, memory, insight, safety , judgement , attention , and sequencing . These impairments, as well as pt's fatigue, pain, and risk for falls  limit pt's ability to safely engage in all baseline areas of occupation, includinggrooming, bathing, dressing, toileting, functional mobility/transfers, and community mobility.  The patient's raw score on the -PAC Daily Activity Inpatient Short Form is 14. A raw score of less than 19 suggests the patient may benefit from discharge to post-acute rehabilitation services. Please refer to the recommendation of the Occupational Therapist for safe discharge planning. Pt would benefit from continued acute OT services throughout hospital course and following D/C. Plan for OT interventions 2-3x per week. From OT standpoint, recommend level II services upon D/C. Pt was left seated in bedside chair with chair alarm on and all needs within reach.     Rehab Resource Intensity Level, OT: II (Moderate Resource Intensity)

## 2024-10-26 NOTE — PROGRESS NOTES
Progress Note - Hospitalist   Name: Mattie Varela 79 y.o. female I MRN: [id number]  Unit/Bed#: -01 I Date of Admission: 10/25/2024   Date of Service: 10/26/2024 I Hospital Day: 1    Assessment & Plan  Klebsiella cystitis  Present on admission, patient presenting from SNF with indwelling Bateman additionally with complaints of suprapubic tenderness/urinary urgency/burning with urination.  Patient otherwise afebrile and nontoxic-appearing  Outpatient urinalysis concerning for acute cystitis, with culture revealing sensitivity only to ertapenem/meropenem.  Sent here for IV antibiotic treatment  Spoke with the daughter who is a PCP with our network Dr. Baires.  Patient was at postacute rehab.  She was recently hospitalized on 10/11/2024 where she had failed urinary retention protocol, Bateman was placed.  Voiding trial was done at the rehab facility and was removed on 10/25/2024.  Urine culture at the facility showed resistance to all bacteria according to the daughter.  Was sensitive to ertapenem.  Unfortunately we do not have this urine culture at our facility.   Urine culture sent (pic gretchen)   Given one dose of amikacin. Will monitor for now.  Medically clear for discharge.   needs insurance auth prior to leaving  PTOT    Chronic heart failure with preserved ejection fraction (HFpEF) ([facility])  Wt Readings from Last 3 Encounters:   10/18/24 74.3 kg (163 lb 12.8 oz)   10/02/24 85.5 kg (188 lb 9.6 oz)   09/26/24 86.2 kg (190 lb)   Patient recently admitted at [facility] for acute exacerbation of this improved with IV diuretics, discharged on torsemide 20 mg daily  Continue torsemide 30 mg daily          Degenerative lumbar spinal stenosis  S/p spinal stimulator although per family no longer working  Continue baclofen and Percocet as needed  Type 2 diabetes mellitus with other skin complications ([facility])    Lab Results   Component Value Date    HGBA1C 7.4 (H) 10/12/2024   Hold home metformin while admitted, start  correctional insulin coverage with Accu-Cheks  Carb controlled diet  Initiate hypoglycemia protocol  Chronic pain disorder  PDMP and facility paperwork reviewed, patient on Percocet 10/325 mg as needed, continue home dosing  Hypertension  Blood pressure acceptable, continue PTA metoprolol succinate 50 mg twice daily and torsemide 20 mg daily with strict hold parameters and monitor blood pressure per protocol  Hypothyroidism  Chronic and stable, continue home dose levothyroxine  Cerebrovascular accident (CVA), unspecified mechanism (HCC)  No new focal deficits appreciated, continue ASA/statin  Chronic obstructive pulmonary disease, unspecified COPD type (HCC)  Not in acute exacerbation, continue as needed albuterol inhaler  Stage 3a chronic kidney disease (HCC)  Lab Results   Component Value Date    EGFR 41 10/26/2024    EGFR 36 10/25/2024    EGFR 37 10/19/2024    CREATININE 1.22 10/26/2024    CREATININE 1.37 (H) 10/25/2024    CREATININE 1.34 (H) 10/19/2024   Baseline creatinine appearing 1.1-1.3, renal function currently near baseline.  Monitor with BMP  Avoid/limit nephrotoxins and avoid hypotension, renally dose medications as needed    VTE Pharmacologic Prophylaxis: VTE Score: 5 High Risk (Score >/= 5) - Pharmacological DVT Prophylaxis Ordered: heparin. Sequential Compression Devices Ordered.    Mobility:   JH-HLM Achieved: 2: Bed activities/Dependent transfer  JH-HLM Goal achieved. Continue to encourage appropriate mobility.    Patient Centered Rounds: I performed bedside rounds with nursing staff today.   Discussions with Specialists or Other Care Team Provider: None    Education and Discussions with Family / Patient: Updated  (daughter) at bedside.    Current Length of Stay: 1 day(s)  Current Patient Status: Inpatient   Certification Statement: The patient will continue to require additional inpatient hospital stay due to Insurance auth  Discharge Plan: Rehab when auth    Code Status: Level 1 -  Full Code    Subjective   Burning urination noted. Otherwise no other subjective complaints. Patient does not report and headaches, shortness of breath, chest pain, abdominal pain, nausea vomiting,         Objective :  Temp:  [97.3 °F (36.3 °C)-98.3 °F (36.8 °C)] 97.3 °F (36.3 °C)  HR:  [80-83] 80  BP: (120-151)/(59-76) 129/60  Resp:  [16-18] 16  SpO2:  [94 %-100 %] 94 %  O2 Device: None (Room air)    There is no height or weight on file to calculate BMI.     Input and Output Summary (last 24 hours):   No intake or output data in the 24 hours ending 10/26/24 0959    Physical Exam  Vitals and nursing note reviewed.   Constitutional:       General: She is not in acute distress.     Appearance: She is well-developed.   HENT:      Head: Normocephalic and atraumatic.      Nose: Nose normal.      Mouth/Throat:      Mouth: Mucous membranes are moist.   Eyes:      Conjunctiva/sclera: Conjunctivae normal.   Cardiovascular:      Rate and Rhythm: Normal rate and regular rhythm.      Heart sounds: No murmur heard.  Pulmonary:      Effort: Pulmonary effort is normal. No respiratory distress.      Breath sounds: Normal breath sounds.   Abdominal:      Palpations: Abdomen is soft.      Tenderness: There is no abdominal tenderness.   Musculoskeletal:      Cervical back: Neck supple.      Right lower leg: No edema.      Left lower leg: No edema.   Skin:     General: Skin is warm and dry.      Capillary Refill: Capillary refill takes less than 2 seconds.   Neurological:      General: No focal deficit present.      Mental Status: She is alert and oriented to person, place, and time.   Psychiatric:         Mood and Affect: Mood normal.           Lines/Drains:              Lab Results: I have reviewed the following results:   Results from last 7 days   Lab Units 10/26/24  0520 10/25/24  2000   WBC Thousand/uL 9.31 8.37   HEMOGLOBIN g/dL 10.1* 11.0*   HEMATOCRIT % 32.2* 35.1   PLATELETS Thousands/uL 278 327   SEGS PCT %  --  61   LYMPHO  PCT %  --  21   MONO PCT %  --  8   EOS PCT %  --  8*     Results from last 7 days   Lab Units 10/26/24  0520   SODIUM mmol/L 135   POTASSIUM mmol/L 4.0   CHLORIDE mmol/L 97   CO2 mmol/L 29   BUN mg/dL 32*   CREATININE mg/dL 1.22   ANION GAP mmol/L 9   CALCIUM mg/dL 8.9   GLUCOSE RANDOM mg/dL 127         Results from last 7 days   Lab Units 10/26/24  0607 10/25/24  2157 10/19/24  1054   POC GLUCOSE mg/dl 126 156* 268*               Recent Cultures (last 7 days):         Imaging Results Review: No pertinent imaging studies reviewed.  Other Study Results Review: EKG was reviewed.     Last 24 Hours Medication List:     Current Facility-Administered Medications:     acetaminophen (TYLENOL) tablet 975 mg, Q8H ARY    albuterol (PROVENTIL HFA,VENTOLIN HFA) inhaler 2 puff, Q6H PRN    aluminum-magnesium hydroxide-simethicone (MAALOX) oral suspension 30 mL, Q6H PRN    aspirin chewable tablet 81 mg, Daily    atorvastatin (LIPITOR) tablet 40 mg, Daily    baclofen tablet 10 mg, BID PRN    docusate sodium (COLACE) capsule 100 mg, BID    ertapenem (INVanz) 1,000 mg in sodium chloride 0.9 % 50 mL IVPB, Q24H    heparin (porcine) subcutaneous injection 5,000 Units, Q8H ARY    HYDROmorphone (DILAUDID) injection 0.5 mg, Q3H PRN    insulin lispro (HumALOG/ADMELOG) 100 units/mL subcutaneous injection 1-5 Units, HS    insulin lispro (HumALOG/ADMELOG) 100 units/mL subcutaneous injection 1-6 Units, TID AC **AND** Fingerstick Glucose (POCT), TID AC    latanoprost (XALATAN) 0.005 % ophthalmic solution 1 drop, HS    levothyroxine tablet 37.5 mcg, Early Morning    lidocaine (LIDODERM) 5 % patch 1 patch, Daily    metoprolol succinate (TOPROL-XL) 24 hr tablet 50 mg, Q12H ARY    Milnacipran HCl TABS 100 mg, Daily    ondansetron (ZOFRAN) injection 4 mg, Q6H PRN    oxyCODONE (ROXICODONE) IR tablet 5 mg, Q6H PRN **OR** oxyCODONE (ROXICODONE) immediate release tablet 10 mg, Q6H PRN    pantoprazole (PROTONIX) EC tablet 40 mg, Daily    senna (SENOKOT)  tablet 17.2 mg, HS    sucralfate (CARAFATE) tablet 1 g, BID AC    temazepam (RESTORIL) capsule 15 mg, HS PRN    torsemide (DEMADEX) tablet 30 mg, Daily    Administrative Statements   Today, Patient Was Seen By: Jean Nix MD  I have spent a total time of 60 minutes in caring for this patient on the day of the visit/encounter including Diagnostic results, Prognosis, Risks and benefits of tx options, Instructions for management, Patient and family education, Importance of tx compliance, Risk factor reductions, Impressions, Counseling / Coordination of care, Documenting in the medical record, Reviewing / ordering tests, medicine, procedures  , Obtaining or reviewing history  , and Communicating with other healthcare professionals .    Had a prolonged discussion with ID, , and patient significant other regarding placement    **Please Note: This note may have been constructed using a voice recognition system.**

## 2024-10-26 NOTE — CONSULTS
Consultation - Infectious Disease   Name: Mattie Varela 79 y.o. female I MRN: [id number]  Unit/Bed#: -01 I Date of Admission: 10/25/2024   Date of Service: 10/26/2024 I Hospital Day: 1   Inpatient consult to Infectious Diseases  Consult performed by: Maria Esther Marrufo MD  Consult ordered by: Jean Nix MD        Physician Requesting Evaluation: Jean Nix, *   Reason for Evaluation / Principal Problem: cystitis   Assessment & Plan  Klebsiella cystitis  Patient recently admitted for a heart failure exacerbation complicated by urinary retention requiring Bateman catheter placement.  10/23 the patient developed hematuria, dysuria and some bladder discomfort so the Bateman catheter was removed and urinalysis/urine culture was sent.  Urine cultures growing 100K ESBL Klebsiella pneumoniae resistant to oral antibiotics.  The patient is afebrile, without leukocytosis or symptoms of upper tract disease. She is having urinary retention after catheter removal which is likely contributing to her symptoms.  Patient started on IV ertapenem.   -Given presence of cystitis without upper tract disease, recommend a one-time dose IV amikacin 15 mg/kg ABW to facilitate discharge back to facility and preserve carbapenem use/avoid resistance   -Bateman catheter to be replaced for urinary retention   -Monitor urinary symptoms   -Monitor fever curve, CBC  Type 2 diabetes mellitus with other skin complications (HCC)  Lab Results   Component Value Date    HGBA1C 7.4 (H) 10/12/2024   This is a risk factor for infection.  Recommend tight glycemic control.    Chronic pain disorder  Pain management per primary team  Stage 3a chronic kidney disease (HCC)  Lab Results   Component Value Date    EGFR 41 10/26/2024    EGFR 36 10/25/2024    EGFR 37 10/19/2024    CREATININE 1.22 10/26/2024    CREATININE 1.37 (H) 10/25/2024    CREATININE 1.34 (H) 10/19/2024   Creatinine currently at baseline 1.1-1.3.  Monitor BMP  Above  management plan to give one time dose Amikacin discussed with Dr. Nix. Discussed with the patient at bedside.     Antibiotics:  Ertapenem     History of Present Illness   Mattie Varela is a 79 y.o. year old female with a history of heart failure, degenerative lumbar spine stenosis with chronic pain, type 2 diabetes mellitus, COPD, prior CVA, CKD 3 who presented to the hospital from SNF with concern for cystitis due to ESBL Klebsiella pneumoniae resistant to oral antibiotics.  The patient was recently admitted to [facility] through 10/19/2024 for acute on chronic heart failure and during that admission a Bateman catheter was placed for urinary retention.  2 days prior to admission, the patient developed hematuria and bladder discomfort and her Bateman catheter was removed and a urinalysis and urine culture were sent.  The patient describes some burning with urination and chills.  No fevers, flank pain.  She was sent to the hospital once urine culture resulted growing >100K ESBL Klebsiella pneumoniae resistant to oral antibiotics.  The patient was started on IV ertapenem patient.  She is afebrile without leukocytosis.  She still describes some burning with urination and bladder discomfort.  The patient is having urinary retention so plan is for reinsertion of the Bateman catheter.  ID is consulted for antibiotic management.      A complete review of systems is negative other than that noted in the HPI.    I have reviewed the patient's PMH, PSH, Social History, Family History, Meds, and Allergies  Historical Information   Past Medical History:   Diagnosis Date    Acid reflux     Acute kidney injury (HCC)     Anemia     hx of iron-deficient    Anxiety     Arthritis     Asthma     last needed inhaler last year 2020    Basal cell carcinoma     upper lip    Chronic narcotic dependence (HCC)     Chronic pain     Colon polyp     Cystocele     Diabetes mellitus (HCC)     stable    Disease of thyroid gland      hypothyroidism    Diverticulosis     Dizziness     at times    Dysfunctional uterine bleeding     last assessed - [id number]    Dysphagia     Fibromyalgia     Gastric ulcer     Gastroparesis     History of colonic polyps     last assessed - [id number]    History of gastroesophageal reflux (GERD)     Hypercholesterolemia     Hyperlipidemia     Hypertension     Hyponatremia     IBS (irritable bowel syndrome)     Pneumobilia 06/18/2022    Post laminectomy syndrome     S/P insertion of spinal cord stimulator 07/18/2018    s/p [employer] loop recorder 3/2/2024 03/02/2024    Seasonal allergies     Shortness of breath     exertional    Spinal stenosis     Status post lumbar spinal fusion 03/16/2018    Stroke (HCC)     pt states slight stroke March 2022     Past Surgical History:   Procedure Laterality Date    APPENDECTOMY      BACK SURGERY      BREAST CYST EXCISION Left     CARDIAC CATHETERIZATION  11/14/2023    Procedure: Cardiac catheterization;  Surgeon: Randolph Holt MD;  [facility];  Service: Cardiology    CARDIAC CATHETERIZATION N/A 11/14/2023    Procedure: Cardiac Coronary Angiogram;  Surgeon: Randolph Holt MD;  [facility];  Service: Cardiology    CARDIAC ELECTROPHYSIOLOGY PROCEDURE N/A 3/2/2024    Procedure: Cardiac loop recorder implant;  Surgeon: Edu Fontaine MD;  [facility];  Service: Cardiology    CHOLECYSTECTOMY      COLONOSCOPY      ESOPHAGOGASTRODUODENOSCOPY N/A 09/28/2016    Procedure: ESOPHAGOGASTRODUODENOSCOPY (EGD);  Surgeon: Mylene Moeller MD;  Location: [facility];  Service:     HERNIA REPAIR      HYSTERECTOMY      TTAH-BSO age 28    LAMINECTOMY      LUMBAR LAMINECTOMY      OOPHORECTOMY      age 28    [place] ARTHRODESIS POSTERIOR/PSTLAT TQ 1NTRSPC THORACIC N/A 06/04/2018    Procedure: Reopening of lumbar incision for T12-L5 posterior instrumented fixation and fusion and T12-L4 posterior decompression;  Surgeon: Wood Rogel MD;  Location: [facility];  Service: Neurosurgery    OR COLONOSCOPY FLX DX W/COLLJ SPEC WHEN PFRMD N/A 2016    Procedure: EGD AND COLONOSCOPY;  Surgeon: Mylene Moeller MD;  [facility];  Service: Gastroenterology    OR DILATION ESOPH UNGUIDED SOUND/BOUGIE 1/MULT PASS N/A 2016    Procedure: DILATATION ESOPHAGEAL;  Surgeon: Mylene Moeller MD;  [facility];  Service: Gastroenterology    OR ESOPHAGOGASTRODUODENOSCOPY TRANSORAL DIAGNOSTIC N/A 2016    Procedure: ESOPHAGOGASTRODUODENOSCOPY (EGD);  Surgeon: Mylene Moeller MD;  [facility];  Service: Gastroenterology    OR INSJ/RPLCMT SPINAL NPG/RCVR POCKET CRTJ&CONNJ Left 2018    Procedure: removal of left buttock implantable pulse generator and placement of new  implantable pulse generator;  Surgeon: Wood Rogel MD;  Location: [facility];  Service: Neurosurgery     Social History     Tobacco Use    Smoking status: Former     Current packs/day: 0.00     Types: Cigarettes     Quit date: 1970     Years since quittin.8    Smokeless tobacco: Never    Tobacco comments:     Denied history of current ever day smoker, Former smoker and Never smoker all documented in [employer]   Vaping Use    Vaping status: Never Used   Substance and Sexual Activity    Alcohol use: Not Currently     Comment: Denied history of alcohol use    Drug use: No     Comment: Denied history of drug use    Sexual activity: Not Currently     E-Cigarette/Vaping    E-Cigarette Use Never User      E-Cigarette/Vaping Substances    Nicotine No     THC No     CBD No     Flavoring No     Other No     Unknown No      Family history non-contributory    Objective :  Temp:  [97.3 °F (36.3 °C)-98.3 °F (36.8 °C)] 97.3 °F (36.3 °C)  HR:  [80-83] 80  BP: (120-151)/(59-76) 129/60  Resp:  [16-18] 16  SpO2:  [94 %-100 %] 94 %  O2 Device: None (Room air)    General:  No acute distress  Psychiatric:  Awake and alert  Cardiac: RRR, no murmurs  Pulmonary:  Normal respiratory excursion without  accessory muscle use  Abdomen:  Soft, mild suprapubic tenderness to palpation  Extremities:  No edema  Skin:  No rashes    Lab Results: I have reviewed the following results:  Results from last 7 days   Lab Units 10/26/24  0520 10/25/24  2000   WBC Thousand/uL 9.31 8.37   HEMOGLOBIN g/dL 10.1* 11.0*   PLATELETS Thousands/uL 278 327     Results from last 7 days   Lab Units 10/26/24  0520 10/25/24  2000   SODIUM mmol/L 135 135   POTASSIUM mmol/L 4.0 4.0   CHLORIDE mmol/L 97 94*   CO2 mmol/L 29 32   BUN mg/dL 32* 36*   CREATININE mg/dL 1.22 1.37*   EGFR ml/min/1.73sq m 41 36   CALCIUM mg/dL 8.9 9.5

## 2024-10-26 NOTE — PLAN OF CARE
Problem: PAIN - ADULT  Goal: Verbalizes/displays adequate comfort level or baseline comfort level  Description: Interventions:  - Encourage patient to monitor pain and request assistance  - Assess pain using appropriate pain scale  - Administer analgesics based on type and severity of pain and evaluate response  - Implement non-pharmacological measures as appropriate and evaluate response  - Consider cultural and social influences on pain and pain management  - Notify physician/advanced practitioner if interventions unsuccessful or patient reports new pain  Outcome: Progressing     Problem: INFECTION - ADULT  Goal: Absence or prevention of progression during hospitalization  Description: INTERVENTIONS:  - Assess and monitor for signs and symptoms of infection  - Monitor lab/diagnostic results  - Monitor all insertion sites, i.e. indwelling lines, tubes, and drains  - Monitor endotracheal if appropriate and nasal secretions for changes in amount and color  - [place] appropriate cooling/warming therapies per order  - Administer medications as ordered  - Instruct and encourage patient and family to use good hand hygiene technique  - Identify and instruct in appropriate isolation precautions for identified infection/condition  Outcome: Progressing

## 2024-10-26 NOTE — ASSESSMENT & PLAN NOTE
Present on admission, patient presenting from SNF with indwelling Bateman additionally with complaints of suprapubic tenderness/urinary urgency/burning with urination.  Patient otherwise afebrile and nontoxic-appearing  Outpatient urinalysis concerning for acute cystitis, with culture revealing sensitivity only to ertapenem/meropenem.  Sent here for IV antibiotic treatment  Spoke with the daughter who is a PCP with our network Dr. Baires.  Patient was at postacute rehab.  She was recently hospitalized on 10/11/2024 where she had failed urinary retention protocol, Bateman was placed.  Voiding trial was done at the rehab facility and was removed on 10/25/2024.  Urine culture at the facility showed resistance to all bacteria according to the daughter.  Was sensitive to ertapenem.  Unfortunately we do not have this urine culture at our facility.   Urine culture sent (pic bellow)   Given one dose of amikacin. Will monitor for now.  Medically clear for discharge.   needs insurance auth prior to leaving  PTOT

## 2024-10-26 NOTE — ASSESSMENT & PLAN NOTE
S/p spinal stimulator although per family no longer working  Continue baclofen and Percocet as needed

## 2024-10-26 NOTE — ASSESSMENT & PLAN NOTE
PDMP and facility paperwork reviewed, patient on Percocet 10/325 mg as needed, continue home dosing

## 2024-10-26 NOTE — UTILIZATION REVIEW
NOTIFICATION OF INPATIENT ADMISSION   AUTHORIZATION REQUEST   SERVICING FACILITY:   [facility]  Address: [address] [place] [address]  Tax ID: [id number]  NPI: [id number] ATTENDING PROVIDER:  Attending Name and NPI#: Jean Nix Md [[id number]]  Address: [address] [place] [address]  Phone: [phone]   ADMISSION INFORMATION:  Place of Service: Inpatient [facility]  Place of Service Code: 21  Inpatient Admission Date/Time: 10/25/24  8:34 PM  Discharge Date/Time: No discharge date for patient encounter.  Admitting Diagnosis Code/Description:  Cystitis [N30.90]  Abnormal laboratory test result [R89.9]     UTILIZATION REVIEW CONTACT:  Diogenes Ojeda Utilization   Network Utilization Review Department  Phone: [phone]  Fax: [phone]  Email: [email or web address]  Contact for approvals/pending authorizations, clinical reviews, and discharge.     PHYSICIAN ADVISORY SERVICES:  Medical Necessity Denial & Uwvs-nw-Tkll Review  Phone: [phone]  Fax: [phone]  Email: [email or web address]     DISCHARGE SUPPORT TEAM:  For Patients Discharge Needs & Updates  Phone: [phone] opt. 2 Fax: [phone]  Email: [email or web address]

## 2024-10-26 NOTE — ASSESSMENT & PLAN NOTE
Patient recently admitted for a heart failure exacerbation complicated by urinary retention requiring Bateman catheter placement.  10/23 the patient developed hematuria, dysuria and some bladder discomfort so the Bateman catheter was removed and urinalysis/urine culture was sent.  Urine cultures growing 100K ESBL Klebsiella pneumoniae resistant to oral antibiotics.  The patient is afebrile, without leukocytosis or symptoms of upper tract disease. She is having urinary retention after catheter removal which is likely contributing to her symptoms.  Patient started on IV ertapenem.   -Given presence of cystitis without upper tract disease, recommend a one-time dose IV amikacin 15 mg/kg ABW to facilitate discharge back to facility and preserve carbapenem use/avoid resistance   -Bateman catheter to be replaced for urinary retention   -Monitor urinary symptoms   -Monitor fever curve, CBC

## 2024-10-26 NOTE — ASSESSMENT & PLAN NOTE
Lab Results   Component Value Date    HGBA1C 7.4 (H) 10/12/2024   Hold home metformin while admitted, start correctional insulin coverage with [employer]  Carb controlled diet  Initiate hypoglycemia protocol

## 2024-10-26 NOTE — ASSESSMENT & PLAN NOTE
Wt Readings from Last 3 Encounters:   10/18/24 74.3 kg (163 lb 12.8 oz)   10/02/24 85.5 kg (188 lb 9.6 oz)   09/26/24 86.2 kg (190 lb)   Patient recently admitted at [facility] for acute exacerbation of this improved with IV diuretics, discharged on torsemide 20 mg daily  Patient denying shortness of breath but does report some increased leg edema, from facility records appears torsemide was to be increased to 30 mg daily, will trial this and assess response.  Continue remainder of cardiac medications and monitor volume status closely

## 2024-10-26 NOTE — PHYSICAL THERAPY NOTE
Physical Therapy Evaluation     Patient's Name: Mattie Varela    Admitting Diagnosis  Cystitis [N30.90]  Abnormal laboratory test result [R89.9]    Problem List  Patient Active Problem List   Diagnosis    Failed back surgical syndrome    Degenerative lumbar spinal stenosis    Type 2 diabetes mellitus with other skin complications ([facility])    Chronic pain disorder    Dyspepsia    Glaucoma    Hypertension    Iron deficiency anemia secondary to inadequate dietary iron intake    Anxiety    Hypercholesterolemia    Hypothyroidism    Overactive bladder    S/P insertion of spinal cord stimulator    Mild intermittent asthma without complication    Prepyloric ulcer    Diarrhea    Stroke-like symptoms    Cerebrovascular accident (CVA), unspecified mechanism ([facility])    Confusion with body shakes and blank stare    Anemia in stage 3a chronic kidney disease  ([facility])    Lower extremity edema    Chronic obstructive pulmonary disease, unspecified COPD type ([facility])    Abnormal urinalysis    History of lumbar surgery    Non obstructive CAD    SADAF (acute kidney injury) ([facility])    Exertional shortness of breath    Urinary retention    Left ventricular outflow obstruction    Obesity, Class I, BMI 30-34.9    Stage 3a chronic kidney disease ([facility])    Hypertensive urgency    AMS (altered mental status)    Encounter for examination for admission to nursing home    Moderate protein-calorie malnutrition (HCC)    s/p "[employer]" loop recorder 3/2/2024    Acute on chronic diastolic congestive heart failure ([facility])    Leg pain, right    Constipation    Generalized edema    Detrusor sphincter dyssynergia    Chest pain    Acute cystitis    Acute low back pain    Acute kidney injury superimposed on chronic kidney disease  ([facility])    Chronic heart failure with preserved ejection fraction (HFpEF) ([facility])    Klebsiella cystitis       Past Medical History  Past Medical History:   Diagnosis Date    Acid reflux     Acute kidney injury (HCC)     Anemia     hx of  iron-deficient    Anxiety     Arthritis     Asthma     last needed inhaler last year 2020    Basal cell carcinoma     upper lip    Chronic narcotic dependence (HCC)     Chronic pain     Colon polyp     Cystocele     Diabetes mellitus (HCC)     stable    Disease of thyroid gland     hypothyroidism    Diverticulosis     Dizziness     at times    Dysfunctional uterine bleeding     last assessed - 07May2014    Dysphagia     Fibromyalgia     Gastric ulcer     Gastroparesis     History of colonic polyps     last assessed - 09Feb2016    History of gastroesophageal reflux (GERD)     Hypercholesterolemia     Hyperlipidemia     Hypertension     Hyponatremia     IBS (irritable bowel syndrome)     Pneumobilia 06/18/2022    Post laminectomy syndrome     S/P insertion of spinal cord stimulator 07/18/2018    s/p [employer] loop recorder 3/2/2024 03/02/2024    Seasonal allergies     Shortness of breath     exertional    Spinal stenosis     Status post lumbar spinal fusion 03/16/2018    Stroke (HCC)     pt states slight stroke March 2022       Past Surgical History  Past Surgical History:   Procedure Laterality Date    APPENDECTOMY      BACK SURGERY      BREAST CYST EXCISION Left     CARDIAC CATHETERIZATION  11/14/2023    Procedure: Cardiac catheterization;  Surgeon: Randolph Holt MD;  [facility];  Service: Cardiology    CARDIAC CATHETERIZATION N/A 11/14/2023    Procedure: Cardiac Coronary Angiogram;  Surgeon: Randolph Holt MD;  [facility];  Service: Cardiology    CARDIAC ELECTROPHYSIOLOGY PROCEDURE N/A 3/2/2024    Procedure: Cardiac loop recorder implant;  Surgeon: Edu Fontaine MD;  [facility];  Service: Cardiology    CHOLECYSTECTOMY      COLONOSCOPY      ESOPHAGOGASTRODUODENOSCOPY N/A 09/28/2016    Procedure: ESOPHAGOGASTRODUODENOSCOPY (EGD);  Surgeon: Mylene Moeller MD;  Location: [facility];  Service:     HERNIA REPAIR      HYSTERECTOMY      TTAH-BSO age 28     LAMINECTOMY      LUMBAR LAMINECTOMY      OOPHORECTOMY      age 28    MD ARTHRODESIS POSTERIOR/PSTLAT TQ 1NTRSPC THORACIC N/A 06/04/2018    Procedure: Reopening of lumbar incision for T12-L5 posterior instrumented fixation and fusion and T12-L4 posterior decompression;  Surgeon: Wood Rogel MD;  Location: [facility];  Service: Neurosurgery    MD COLONOSCOPY FLX DX W/COLLJ SPEC WHEN PFRMD N/A 03/02/2016    Procedure: EGD AND COLONOSCOPY;  Surgeon: Mylene Moeller MD;  [facility]: [facility];  Service: Gastroenterology    MD DILATION ESOPH UNGUIDED SOUND/BOUGIE 1/MULT PASS N/A 09/28/2016    Procedure: DILATATION ESOPHAGEAL;  Surgeon: Mylene Moeller MD;  [facility];  Service: Gastroenterology    MD ESOPHAGOGASTRODUODENOSCOPY TRANSORAL DIAGNOSTIC N/A 07/18/2016    Procedure: ESOPHAGOGASTRODUODENOSCOPY (EGD);  Surgeon: Mylene Moeller MD;  [facility];  Service: Gastroenterology    MD INSJ/RPLCMT SPINAL NPG/RCVR POCKET CRTJ&CONNJ Left 06/04/2018    Procedure: removal of left buttock implantable pulse generator and placement of new  implantable pulse generator;  Surgeon: Wood Rogel MD;  [facility];  Service: Neurosurgery          10/26/24 1113   PT Last Visit   PT Visit Date 10/26/24   Note Type   Note type Evaluation   Pain Assessment   Pain Assessment Tool FLACC   Pain Location/Orientation Orientation: Left;Location: Back   Pain Onset/Description Onset: Ongoing;Frequency: Constant/Continuous;Descriptor: Aching;Descriptor: Discomfort;Descriptor: Sore   Patient's Stated Pain Goal No pain   Hospital Pain Intervention(s) Repositioned;Ambulation/increased activity;Emotional support   Pain Rating: FLACC (Rest) - Face 1   Pain Rating: FLACC (Rest) - Legs 0   Pain Rating: FLACC (Rest) - Activity 0   Pain Rating: FLACC (Rest) - Cry 1   Pain Rating: FLACC (Rest) - Consolability 0   Score: FLACC (Rest) 2   Pain Rating: FLACC (Activity) - Face 1   Pain Rating: FLACC (Activity) - Legs 0   Pain Rating: FLACC  (Activity) - Activity 0   Pain Rating: FLACC (Activity) - Cry 1   Pain Rating: FLACC (Activity) - Consolability 1   Score: FLACC (Activity) 3   Restrictions/Precautions   Braces or Orthoses   (denies)   Other Precautions Multiple lines;Telemetry;Fall Risk;Cognitive;Chair Alarm;Hard of hearing   Home Living   Type of Home Assisted living  (pt reports currently being in rehab)   Home Layout One level   Home Equipment Walker   Prior Function   Level of [place] Independent with functional mobility  (w/ rollator (at the [facility]); reports she walks to the dining hood)   Lives With Facility staff   Receives Help From Personal care attendant   General   Additional Pertinent History cleared for assessment by sergey   Cognition   Overall Cognitive Status WFL   Arousal/Participation Cooperative   Orientation Level Oriented to person;Oriented to place;Oriented to situation   Memory Decreased recall of biographical information;Decreased recall of recent events;Decreased recall of precautions   Following Commands Follows one step commands with increased time or repetition   Subjective   Subjective Alert; in bed; min [place]; somewhat flat affect; agreeable to mobilize; reports she wishes she was not in bed but in the chair   RUE Assessment   RUE Assessment WFL  (AROM)   LUE Assessment   LUE Assessment WFL  (AROM)   RLE Assessment   RLE Assessment X  (Decreased AROM at the hip)   Strength RLE   RLE Overall Strength   (Fair - hip; fair/ fair + knee and ankle (grossly))   LLE Assessment   LLE Assessment X  (Decreased AROM at the hip)   Strength LLE   LLE Overall Strength   (Fair - hip; fair/ fair + knee and ankle (grossly))   Bed Mobility   Supine to Sit 3  Moderate assistance   Additional items Assist x 2;HOB elevated;Increased time required;Verbal cues;LE management   Transfers   Sit to Stand 3  Moderate assistance   Additional items Assist x 1;Assist x 2;Increased time required;Verbal cues  (3 trials; (A)x 2 -->1)   Stand to Sit 3   Moderate assistance   Additional items Assist x 1;Assist x 2;Increased time required;Verbal cues  (3 trials; (A)x 2 -->1)   Stand pivot 3  Moderate assistance   Additional items Assist x 2;Increased time required;Verbal cues  (bed to chair to the (R) side)   Additional Comments Upon each standing trial, pt exhibited an episode of urinary incontinence requiring pad and socks changes   Ambulation/Elevation   Gait pattern Short stride;Inconsistent jorge;Excessively slow   Gait Assistance 3  Moderate assist   Additional items Verbal cues;Tactile cues;Assist x 1   Assistive Device Rolling walker   Distance 2 ft  (bed to chair transition)   Balance   Static Sitting Fair -   Dynamic Sitting Poor +   Static Standing Poor   Dynamic Standing Poor   Ambulatory Poor   Activity Tolerance   Activity Tolerance Patient limited by fatigue   Medical Staff Made Aware Co-eval performed w/ OTR due to complexity of medical status and multiple comorbidities   Nurse Made Aware spoke to RN   Assessment   Prognosis Fair   Problem List Decreased strength;Decreased endurance;Impaired balance;Decreased mobility;Decreased cognition;Obesity   Assessment Pt is 79 y.o. female admitted from SNF with concerns for acute cystitis with outpatient urine culture concerning for ESBL Klebsiella pneumonia; undergoing w/u. Pt 's comorbidities affecting POC include: Anemia, Anxiety, Arthritis, Asthma, Chronic narcotic dependence (HCC), Chronic pain, Diabetes mellitus (HCC), Dizziness, Fibromyalgia, Hypertension, Post laminectomy syndrome, S/P insertion of spinal cord stimulator, s/p [employer] loop recorder 3/2/2024, Spinal stenosis, and Stroke and personal factors of: advanced age and being in rehab. Pt's clinical presentation is currently  unstable/unpredictable which is evident in ongoing telem monitoring, abn lab values and  tolerance to only 2 feet of ambulation at bedside w/ (A)x 1-2 required for OOB mobility. Pt presents w/ mod overall weakness, incl  decreased LE strength, decreased endurance, decreased activity tolerance, impaired balance, gait deviations, and fall risk. Will cont to follow pt in PT for progressive mobilization to address above functional deficits and to max level of (I), endurance, and safety. D/C recommendations are outlined below. Will cont to follow until then.   Goals   Patient Goals to be OOB   STG Expiration Date 11/05/24   Short Term Goal #1 7-10 days. Pt will amb 50 ft w/ rw, (S)x1 in order to initiate return to at least household amb status. Pt will achieve (S) level w/ bed mob in order to facilitate safety with OOB and back to bed transitions in prior living environment. Pt will perform transfers w/ (S)x1 to assure safety w/ functional mobility/transitions w/ all aspects of mobility/locomotion. Pt will participate in LE therex and balance activities to max progression w/ mobility skills.   PT Treatment Day 0   Plan   Treatment/Interventions Functional transfer training;LE strengthening/ROM;Therapeutic exercise;Endurance training;Cognitive reorientation;Bed mobility;Gait training;Spoke to nursing;OT   PT Frequency 2-3x/wk   Discharge Recommendation   Rehab Resource Intensity Level, PT II (Moderate Resource Intensity)   Equipment Recommended Walker   Walker Package Recommended Wheeled walker   AM-PAC Basic Mobility Inpatient   Turning in Flat Bed Without Bedrails 2   Lying on Back to Sitting on Edge of Flat Bed Without Bedrails 2   Moving Bed to Chair 2   Standing Up From Chair Using Arms 2   Walk in Room 2   Climb 3-5 Stairs With Railing 1   Basic Mobility Inpatient Raw Score 11   Basic Mobility Standardized Score 30.25   [facility] Highest Level Of Mobility   -[facility] Goal 4: Move to chair/commode   -[facility] Achieved 4: Move to chair/commode   Modified Mills Scale   Modified Mills Scale 4   End of Consult   Patient Position at End of Consult Bedside chair;Bed/Chair alarm activated;All needs within reach         Ruben Moncada, PT

## 2024-10-26 NOTE — ASSESSMENT & PLAN NOTE
Lab Results   Component Value Date    EGFR 41 10/26/2024    EGFR 36 10/25/2024    EGFR 37 10/19/2024    CREATININE 1.22 10/26/2024    CREATININE 1.37 (H) 10/25/2024    CREATININE 1.34 (H) 10/19/2024   Creatinine currently at baseline 1.1-1.3.  Monitor BMP

## 2024-10-26 NOTE — ASSESSMENT & PLAN NOTE
Lab Results   Component Value Date    EGFR 36 10/25/2024    EGFR 37 10/19/2024    EGFR 27 10/18/2024    CREATININE 1.37 (H) 10/25/2024    CREATININE 1.34 (H) 10/19/2024    CREATININE 1.70 (H) 10/18/2024   Baseline creatinine appearing 1.1-1.3, renal function currently near baseline.  Monitor with BMP  Avoid/limit nephrotoxins and avoid hypotension, renally dose medications as needed

## 2024-10-26 NOTE — ASSESSMENT & PLAN NOTE
Blood pressure acceptable, continue PTA metoprolol succinate 50 mg twice daily and torsemide 20 mg daily with strict hold parameters and monitor blood pressure per protocol

## 2024-10-26 NOTE — ASSESSMENT & PLAN NOTE
Lab Results   Component Value Date    EGFR 41 10/26/2024    EGFR 36 10/25/2024    EGFR 37 10/19/2024    CREATININE 1.22 10/26/2024    CREATININE 1.37 (H) 10/25/2024    CREATININE 1.34 (H) 10/19/2024   Baseline creatinine appearing 1.1-1.3, renal function currently near baseline.  Monitor with BMP  Avoid/limit nephrotoxins and avoid hypotension, renally dose medications as needed

## 2024-10-26 NOTE — ASSESSMENT & PLAN NOTE
Lab Results   Component Value Date    HGBA1C 7.4 (H) 10/12/2024   This is a risk factor for infection.  Recommend tight glycemic control.

## 2024-10-27 LAB
ANION GAP SERPL CALCULATED.3IONS-SCNC: 10 MMOL/L (ref 4–13)
BACTERIA UR CULT: ABNORMAL
BACTERIA UR CULT: ABNORMAL
BUN SERPL-MCNC: 29 MG/DL (ref 5–25)
CALCIUM SERPL-MCNC: 9 MG/DL (ref 8.4–10.2)
CHLORIDE SERPL-SCNC: 99 MMOL/L (ref 96–108)
CO2 SERPL-SCNC: 29 MMOL/L (ref 21–32)
CREAT SERPL-MCNC: 1.18 MG/DL (ref 0.6–1.3)
ERYTHROCYTE [DISTWIDTH] IN BLOOD BY AUTOMATED COUNT: 13.7 % (ref 11.6–15.1)
GFR SERPL CREATININE-BSD FRML MDRD: 43 ML/MIN/1.73SQ M
GLUCOSE SERPL-MCNC: 127 MG/DL (ref 65–140)
GLUCOSE SERPL-MCNC: 139 MG/DL (ref 65–140)
GLUCOSE SERPL-MCNC: 150 MG/DL (ref 65–140)
GLUCOSE SERPL-MCNC: 193 MG/DL (ref 65–140)
GLUCOSE SERPL-MCNC: 237 MG/DL (ref 65–140)
HCT VFR BLD AUTO: 31.5 % (ref 34.8–46.1)
HGB BLD-MCNC: 9.9 G/DL (ref 11.5–15.4)
MCH RBC QN AUTO: 28.5 PG (ref 26.8–34.3)
MCHC RBC AUTO-ENTMCNC: 31.4 G/DL (ref 31.4–37.4)
MCV RBC AUTO: 91 FL (ref 82–98)
PLATELET # BLD AUTO: 295 THOUSANDS/UL (ref 149–390)
PMV BLD AUTO: 9.9 FL (ref 8.9–12.7)
POTASSIUM SERPL-SCNC: 3.7 MMOL/L (ref 3.5–5.3)
RBC # BLD AUTO: 3.47 MILLION/UL (ref 3.81–5.12)
SODIUM SERPL-SCNC: 138 MMOL/L (ref 135–147)
WBC # BLD AUTO: 5.21 THOUSAND/UL (ref 4.31–10.16)

## 2024-10-27 PROCEDURE — 97530 THERAPEUTIC ACTIVITIES: CPT

## 2024-10-27 PROCEDURE — 80048 BASIC METABOLIC PNL TOTAL CA: CPT

## 2024-10-27 PROCEDURE — 82948 REAGENT STRIP/BLOOD GLUCOSE: CPT

## 2024-10-27 PROCEDURE — 99232 SBSQ HOSP IP/OBS MODERATE 35: CPT | Performed by: STUDENT IN AN ORGANIZED HEALTH CARE EDUCATION/TRAINING PROGRAM

## 2024-10-27 PROCEDURE — 99233 SBSQ HOSP IP/OBS HIGH 50: CPT

## 2024-10-27 PROCEDURE — 97535 SELF CARE MNGMENT TRAINING: CPT

## 2024-10-27 PROCEDURE — 85027 COMPLETE CBC AUTOMATED: CPT

## 2024-10-27 PROCEDURE — 97116 GAIT TRAINING THERAPY: CPT

## 2024-10-27 RX ORDER — ECHINACEA PURPUREA EXTRACT 125 MG
1 TABLET ORAL
Status: DISCONTINUED | OUTPATIENT
Start: 2024-10-27 | End: 2024-10-30 | Stop reason: HOSPADM

## 2024-10-27 RX ORDER — METHOCARBAMOL 500 MG/1
500 TABLET, FILM COATED ORAL EVERY 6 HOURS PRN
Status: DISCONTINUED | OUTPATIENT
Start: 2024-10-27 | End: 2024-10-30 | Stop reason: HOSPADM

## 2024-10-27 RX ADMIN — SUCRALFATE 1 G: 1 TABLET ORAL at 17:12

## 2024-10-27 RX ADMIN — OXYCODONE HYDROCHLORIDE 10 MG: 10 TABLET ORAL at 14:33

## 2024-10-27 RX ADMIN — INSULIN LISPRO 2 UNITS: 100 INJECTION, SOLUTION INTRAVENOUS; SUBCUTANEOUS at 21:39

## 2024-10-27 RX ADMIN — LIDOCAINE 1 PATCH: 50 PATCH CUTANEOUS at 09:14

## 2024-10-27 RX ADMIN — ACETAMINOPHEN 975 MG: 325 TABLET, FILM COATED ORAL at 06:16

## 2024-10-27 RX ADMIN — INSULIN LISPRO 2 UNITS: 100 INJECTION, SOLUTION INTRAVENOUS; SUBCUTANEOUS at 11:15

## 2024-10-27 RX ADMIN — HEPARIN SODIUM 5000 UNITS: 5000 INJECTION INTRAVENOUS; SUBCUTANEOUS at 21:38

## 2024-10-27 RX ADMIN — HEPARIN SODIUM 5000 UNITS: 5000 INJECTION INTRAVENOUS; SUBCUTANEOUS at 14:34

## 2024-10-27 RX ADMIN — OXYCODONE HYDROCHLORIDE 10 MG: 10 TABLET ORAL at 21:41

## 2024-10-27 RX ADMIN — DOCUSATE SODIUM 100 MG: 100 CAPSULE, LIQUID FILLED ORAL at 09:13

## 2024-10-27 RX ADMIN — DOCUSATE SODIUM 100 MG: 100 CAPSULE, LIQUID FILLED ORAL at 17:12

## 2024-10-27 RX ADMIN — AMIKACIN SULFATE 850 MG: 250 INJECTION, SOLUTION INTRAMUSCULAR; INTRAVENOUS at 15:12

## 2024-10-27 RX ADMIN — SENNOSIDES 17.2 MG: 8.6 TABLET, FILM COATED ORAL at 21:40

## 2024-10-27 RX ADMIN — SUCRALFATE 1 G: 1 TABLET ORAL at 06:16

## 2024-10-27 RX ADMIN — LEVOTHYROXINE SODIUM 37.5 MCG: 75 TABLET ORAL at 06:16

## 2024-10-27 RX ADMIN — HEPARIN SODIUM 5000 UNITS: 5000 INJECTION INTRAVENOUS; SUBCUTANEOUS at 06:16

## 2024-10-27 RX ADMIN — ACETAMINOPHEN 975 MG: 325 TABLET, FILM COATED ORAL at 14:34

## 2024-10-27 RX ADMIN — ACETAMINOPHEN 975 MG: 325 TABLET, FILM COATED ORAL at 21:37

## 2024-10-27 RX ADMIN — METHOCARBAMOL 500 MG: 500 TABLET ORAL at 17:12

## 2024-10-27 RX ADMIN — ATORVASTATIN CALCIUM 40 MG: 40 TABLET, FILM COATED ORAL at 09:13

## 2024-10-27 RX ADMIN — PANTOPRAZOLE SODIUM 40 MG: 40 TABLET, DELAYED RELEASE ORAL at 09:13

## 2024-10-27 RX ADMIN — INSULIN LISPRO 1 UNITS: 100 INJECTION, SOLUTION INTRAVENOUS; SUBCUTANEOUS at 06:16

## 2024-10-27 RX ADMIN — METOPROLOL SUCCINATE 50 MG: 50 TABLET, EXTENDED RELEASE ORAL at 21:38

## 2024-10-27 RX ADMIN — ASPIRIN 81 MG CHEWABLE TABLET 81 MG: 81 TABLET CHEWABLE at 09:13

## 2024-10-27 NOTE — PLAN OF CARE
Problem: OCCUPATIONAL THERAPY ADULT  Goal: Performs self-care activities at highest level of function for planned discharge setting.  See evaluation for individualized goals.  Description: Treatment Interventions: ADL retraining, Functional transfer training, Cognitive reorientation, Endurance training, Continued evaluation, Energy conservation          See flowsheet documentation for full assessment, interventions and recommendations.   Outcome: Progressing  Note: Limitation: Decreased ADL status, Decreased Safe judgement during ADL, Decreased endurance, Decreased self-care trans, Decreased high-level ADLs  Prognosis: Fair  Assessment: pt participated in pm ot session and was seen focusing on  adl's dynamic balance, activity tolerence and education. pt required close sba to occasional min asst for mobility with rw, transfers from chair and for lb dressing. pt now has indwelling catheter which she did not have prior and is noted to have water blisters on r lower leg. pt is cooperative and motivated. she reports only have nsg asst x 1xwk for showers otherwise needs to dress and toilet herself. pt also resides far from dinning room and prior to this admission had to sit and rest on rollator seat only when fatigued and sob from fluid buildup per pt report. do feel pt should persue in pt rehab inorder to maximize her activity tolerence and adl / mobility status before returning to her nearly independent [place] apartment. pt may require more asst in apartment upon return     Rehab Resource Intensity Level, OT: II (Moderate Resource Intensity)     April A Storm

## 2024-10-27 NOTE — ASSESSMENT & PLAN NOTE
Patient recently admitted for a heart failure exacerbation complicated by urinary retention requiring Bateman catheter placement.  10/23 the patient developed hematuria, dysuria and some bladder discomfort so the Bateman catheter was removed and urinalysis/urine culture was sent.  Urine cultures growing 100K ESBL Klebsiella pneumoniae resistant to oral antibiotics.  The patient is afebrile, without leukocytosis or symptoms of upper tract disease. She is having urinary retention after catheter removal which is likely contributing to her symptoms.  Received one-time dose of IV amikacin yesterday to facilitate discharge back to facility and preserve carbapenem use/avoid resistance.  Clinically improved.  Received additional dose of amikacin today since she was not discharged and order was still in place   -no further antibiotics indicated    -Maintain Bateman catheter for retention.   -Monitor urinary symptoms   -Monitor fever curve, CBC

## 2024-10-27 NOTE — PROGRESS NOTES
Progress Note - Hospitalist   Name: Mattie Varela 79 y.o. female I MRN: [id number]  Unit/Bed#: -01 I Date of Admission: 10/25/2024   Date of Service: 10/27/2024 I Hospital Day: 2    Assessment & Plan  Klebsiella cystitis  Present on admission, patient presenting from SNF with indwelling Bateman additionally with complaints of suprapubic tenderness/urinary urgency/burning with urination.  Patient otherwise afebrile and nontoxic-appearing  Outpatient urinalysis concerning for acute cystitis, with culture revealing sensitivity only to ertapenem/meropenem.  Sent here for IV antibiotic treatment  Spoke with the daughter who is a PCP with our network Dr. Baires.  Patient was at postacute rehab.  She was recently hospitalized on 10/11/2024 where she had failed urinary retention protocol, Bateman was placed.  Voiding trial was done at the rehab facility and was removed on 10/25/2024.  Urine culture at the facility showed resistance to all bacteria according to the daughter.  Was sensitive to ertapenem.  Unfortunately we do not have this urine culture at our facility.   Urine culture sent (pic gretchen)   Given one dose of amikacin. Will monitor for now.  Medically clear for discharge.   needs insurance auth prior to leaving  PTOT    Chronic heart failure with preserved ejection fraction (HFpEF) ([facility])  Wt Readings from Last 3 Encounters:   10/18/24 74.3 kg (163 lb 12.8 oz)   10/02/24 85.5 kg (188 lb 9.6 oz)   09/26/24 86.2 kg (190 lb)   Patient recently admitted at [facility] for acute exacerbation of this improved with IV diuretics, discharged on torsemide 20 mg daily  Continue torsemide 30 mg daily          Degenerative lumbar spinal stenosis  S/p spinal stimulator although per family no longer working  Continue baclofen and Percocet as needed  Type 2 diabetes mellitus with other skin complications ([facility])    Lab Results   Component Value Date    HGBA1C 7.4 (H) 10/12/2024   Hold home metformin while admitted, start  correctional insulin coverage with Accu-Cheks  Carb controlled diet  Initiate hypoglycemia protocol  Chronic pain disorder  PDMP and facility paperwork reviewed, patient on Percocet 10/325 mg as needed, continue home dosing  Hypertension  Blood pressure acceptable, continue PTA metoprolol succinate 50 mg twice daily and torsemide 20 mg daily with strict hold parameters and monitor blood pressure per protocol  Hypothyroidism  Chronic and stable, continue home dose levothyroxine  Cerebrovascular accident (CVA), unspecified mechanism (HCC)  No new focal deficits appreciated, continue ASA/statin  Chronic obstructive pulmonary disease, unspecified COPD type (HCC)  Not in acute exacerbation, continue as needed albuterol inhaler  Stage 3a chronic kidney disease (HCC)  Lab Results   Component Value Date    EGFR 43 10/27/2024    EGFR 41 10/26/2024    EGFR 36 10/25/2024    CREATININE 1.18 10/27/2024    CREATININE 1.22 10/26/2024    CREATININE 1.37 (H) 10/25/2024   Baseline creatinine appearing 1.1-1.3, renal function currently near baseline.  Monitor with BMP  Avoid/limit nephrotoxins and avoid hypotension, renally dose medications as needed    VTE Pharmacologic Prophylaxis: VTE Score: 5 High Risk (Score >/= 5) - Pharmacological DVT Prophylaxis Ordered: heparin. Sequential Compression Devices Ordered.    Mobility:   Basic Mobility Inpatient Raw Score: 11  JH-HLM Goal: 4: Move to chair/commode  JH-HLM Achieved: 4: Move to chair/commode  JH-HLM Goal achieved. Continue to encourage appropriate mobility.    Patient Centered Rounds: I performed bedside rounds with nursing staff today.   Discussions with Specialists or Other Care Team Provider: None    Education and Discussions with Family / Patient: Updated  (daughter) at bedside.    Current Length of Stay: 2 day(s)  Current Patient Status: Inpatient   Certification Statement: The patient will continue to require additional inpatient hospital stay due to Insurance  auth  Discharge Plan: Rehab when auth    Code Status: Level 1 - Full Code    Subjective   Patient does not report and headaches, shortness of breath, chest pain, abdominal pain, nausea vomiting, lower leg edema. Also reports good sleep          Objective :  Temp:  [98.1 °F (36.7 °C)-98.5 °F (36.9 °C)] 98.5 °F (36.9 °C)  HR:  [72-89] 89  BP: (106-117)/(48-56) 117/56  Resp:  [16-18] 17  SpO2:  [93 %-99 %] 99 %  O2 Device: None (Room air)    There is no height or weight on file to calculate BMI.     Input and Output Summary (last 24 hours):     Intake/Output Summary (Last 24 hours) at 10/27/2024 1547  Last data filed at 10/27/2024 1503  Gross per 24 hour   Intake --   Output 1650 ml   Net -1650 ml       Physical Exam  Vitals and nursing note reviewed.   Constitutional:       General: She is not in acute distress.     Appearance: She is well-developed.   HENT:      Head: Normocephalic and atraumatic.      Nose: Nose normal.      Mouth/Throat:      Mouth: Mucous membranes are moist.   Eyes:      Conjunctiva/sclera: Conjunctivae normal.   Cardiovascular:      Rate and Rhythm: Normal rate and regular rhythm.      Heart sounds: No murmur heard.  Pulmonary:      Effort: Pulmonary effort is normal. No respiratory distress.      Breath sounds: Normal breath sounds.   Abdominal:      Palpations: Abdomen is soft.      Tenderness: There is no abdominal tenderness.   Musculoskeletal:      Cervical back: Neck supple.      Right lower leg: No edema.      Left lower leg: No edema.   Skin:     General: Skin is warm and dry.      Capillary Refill: Capillary refill takes less than 2 seconds.   Neurological:      General: No focal deficit present.      Mental Status: She is alert and oriented to person, place, and time.   Psychiatric:         Mood and Affect: Mood normal.           Lines/Drains:  Lines/Drains/Airways       Active Status       Name Placement date Placement time Site Days    Urethral Catheter 16 Fr. 10/26/24  1311  --  1                             Lab Results: I have reviewed the following results:   Results from last 7 days   Lab Units 10/27/24  0453 10/26/24  0520 10/25/24  2000   WBC Thousand/uL 5.21   < > 8.37   HEMOGLOBIN g/dL 9.9*   < > 11.0*   HEMATOCRIT % 31.5*   < > 35.1   PLATELETS Thousands/uL 295   < > 327   SEGS PCT %  --   --  61   LYMPHO PCT %  --   --  21   MONO PCT %  --   --  8   EOS PCT %  --   --  8*    < > = values in this interval not displayed.     Results from last 7 days   Lab Units 10/27/24  0453   SODIUM mmol/L 138   POTASSIUM mmol/L 3.7   CHLORIDE mmol/L 99   CO2 mmol/L 29   BUN mg/dL 29*   CREATININE mg/dL 1.18   ANION GAP mmol/L 10   CALCIUM mg/dL 9.0   GLUCOSE RANDOM mg/dL 127         Results from last 7 days   Lab Units 10/27/24  1057 10/27/24  0615 10/26/24  2034 10/26/24  1628 10/26/24  1104 10/26/24  0607 10/25/24  2157   POC GLUCOSE mg/dl 193* 150* 131 131 142* 126 156*               Recent Cultures (last 7 days):   Results from last 7 days   Lab Units 10/25/24  2042   URINE CULTURE  >100,000 cfu/ml Klebsiella pneumoniae ESBL*  10,000-19,000 cfu/ml       Imaging Results Review: No pertinent imaging studies reviewed.  Other Study Results Review: EKG was reviewed.     Last 24 Hours Medication List:     Current Facility-Administered Medications:     acetaminophen (TYLENOL) tablet 975 mg, Q8H ARY    albuterol (PROVENTIL HFA,VENTOLIN HFA) inhaler 2 puff, Q6H PRN    aluminum-magnesium hydroxide-simethicone (MAALOX) oral suspension 30 mL, Q6H PRN    amikacin (AMIKIN) 850 mg in sodium chloride 0.9 % 100 mL IVPB, Q24H, Last Rate: 850 mg (10/27/24 1512)    aspirin chewable tablet 81 mg, Daily    atorvastatin (LIPITOR) tablet 40 mg, Daily    docusate sodium (COLACE) capsule 100 mg, BID    heparin (porcine) subcutaneous injection 5,000 Units, Q8H ARY    HYDROmorphone (DILAUDID) injection 0.5 mg, Q3H PRN    insulin lispro (HumALOG/ADMELOG) 100 units/mL subcutaneous injection 1-5 Units, HS    insulin lispro  (HumALOG/ADMELOG) 100 units/mL subcutaneous injection 1-6 Units, TID AC **AND** Fingerstick Glucose (POCT), TID AC    latanoprost (XALATAN) 0.005 % ophthalmic solution 1 drop, HS    levothyroxine tablet 37.5 mcg, Early Morning    lidocaine (LIDODERM) 5 % patch 1 patch, Daily    methocarbamol (ROBAXIN) tablet 500 mg, Q6H PRN    metoprolol succinate (TOPROL-XL) 24 hr tablet 50 mg, Q12H ARY    Milnacipran HCl TABS 100 mg, Daily    ondansetron (ZOFRAN) injection 4 mg, Q6H PRN    oxyCODONE (ROXICODONE) IR tablet 5 mg, Q6H PRN **OR** oxyCODONE (ROXICODONE) immediate release tablet 10 mg, Q6H PRN    pantoprazole (PROTONIX) EC tablet 40 mg, Daily    senna (SENOKOT) tablet 17.2 mg, HS    sodium chloride (OCEAN) 0.65 % nasal spray 1 spray, Q1H PRN    sucralfate (CARAFATE) tablet 1 g, BID AC    temazepam (RESTORIL) capsule 15 mg, HS PRN    torsemide (DEMADEX) tablet 30 mg, Daily    Administrative Statements   Today, Patient Was Seen By: Jean Nix MD  I have spent a total time of 60 minutes in caring for this patient on the day of the visit/encounter including Diagnostic results, Prognosis, Risks and benefits of tx options, Instructions for management, Patient and family education, Importance of tx compliance, Risk factor reductions, Impressions, Counseling / Coordination of care, Documenting in the medical record, Reviewing / ordering tests, medicine, procedures  , Obtaining or reviewing history  , and Communicating with other healthcare professionals .    Long discussion with PT family and patient    **Please Note: This note may have been constructed using a voice recognition system.**

## 2024-10-27 NOTE — PROGRESS NOTES
Progress Note - Infectious Disease   Name: Mattie Varela 79 y.o. female I MRN: [id number]  Unit/Bed#: -01 I Date of Admission: 10/25/2024   Date of Service: 10/27/2024 I Hospital Day: 2     Assessment & Plan  Klebsiella cystitis  Patient recently admitted for a heart failure exacerbation complicated by urinary retention requiring Bateman catheter placement.  10/23 the patient developed hematuria, dysuria and some bladder discomfort so the Bateman catheter was removed and urinalysis/urine culture was sent.  Urine cultures growing 100K ESBL Klebsiella pneumoniae resistant to oral antibiotics.  The patient is afebrile, without leukocytosis or symptoms of upper tract disease. She is having urinary retention after catheter removal which is likely contributing to her symptoms.  Received one-time dose of IV amikacin yesterday to facilitate discharge back to facility and preserve carbapenem use/avoid resistance.  Clinically improved.  Received additional dose of amikacin today since she was not discharged and order was still in place   -no further antibiotics indicated    -Maintain Bateman catheter for retention.   -Monitor urinary symptoms   -Monitor fever curve, CBC  Type 2 diabetes mellitus with other skin complications (HCC)  Lab Results   Component Value Date    HGBA1C 7.4 (H) 10/12/2024   This is a risk factor for infection.  Recommend tight glycemic control.    Chronic pain disorder  Pain management per primary team  Stage 3a chronic kidney disease (HCC)  Lab Results   Component Value Date    EGFR 43 10/27/2024    EGFR 41 10/26/2024    EGFR 36 10/25/2024    CREATININE 1.18 10/27/2024    CREATININE 1.22 10/26/2024    CREATININE 1.37 (H) 10/25/2024   Creatinine currently at baseline 1.1-1.3.  Monitor BMP    Above management plan to monitor off antibiotics discussed with Dr. Nix.  Discussed with the patient at bedside.  ID will sign off, please call with questions.    Antibiotics:  1x dose Amikacin    Subjective    The patient reports she is feeling better today.  Bateman catheter reinserted yesterday.  She denies any suprapubic tenderness or burning.    Objective :  Temp:  [98.1 °F (36.7 °C)-98.5 °F (36.9 °C)] 98.5 °F (36.9 °C)  HR:  [72-89] 89  BP: (106-117)/(48-56) 117/56  Resp:  [16-18] 17  SpO2:  [93 %-99 %] 99 %  O2 Device: None (Room air)    General:  No acute distress  Psychiatric:  Awake and alert  Pulmonary:  Normal respiratory excursion without accessory muscle use  Abdomen:  Soft, nontender.  Bateman catheter in place  Extremities:  No edema  Skin:  No rashes    Lab Results: I have reviewed the following results:  Results from last 7 days   Lab Units 10/27/24  0453 10/26/24  0520 10/25/24  2000   WBC Thousand/uL 5.21 9.31 8.37   HEMOGLOBIN g/dL 9.9* 10.1* 11.0*   PLATELETS Thousands/uL 295 278 327     Results from last 7 days   Lab Units 10/27/24  0453 10/26/24  0520 10/25/24  2000   SODIUM mmol/L 138 135 135   POTASSIUM mmol/L 3.7 4.0 4.0   CHLORIDE mmol/L 99 97 94*   CO2 mmol/L 29 29 32   BUN mg/dL 29* 32* 36*   CREATININE mg/dL 1.18 1.22 1.37*   EGFR ml/min/1.73sq m 43 41 36   CALCIUM mg/dL 9.0 8.9 9.5     Results from last 7 days   Lab Units 10/25/24  2042   URINE CULTURE  >100,000 cfu/ml Klebsiella pneumoniae ESBL*  10,000-19,000 cfu/ml

## 2024-10-27 NOTE — ASSESSMENT & PLAN NOTE
Lab Results   Component Value Date    EGFR 43 10/27/2024    EGFR 41 10/26/2024    EGFR 36 10/25/2024    CREATININE 1.18 10/27/2024    CREATININE 1.22 10/26/2024    CREATININE 1.37 (H) 10/25/2024   Creatinine currently at baseline 1.1-1.3.  Monitor BMP

## 2024-10-27 NOTE — PLAN OF CARE
Problem: PHYSICAL THERAPY ADULT  Goal: Performs mobility at highest level of function for planned discharge setting.  See evaluation for individualized goals.  Description: Treatment/Interventions: Functional transfer training, LE strengthening/ROM, Therapeutic exercise, Endurance training, Cognitive reorientation, Bed mobility, Gait training, Spoke to nursing, OT  Equipment Recommended: Walker       See flowsheet documentation for full assessment, interventions and recommendations.  Outcome: Progressing  Note: Prognosis: Fair  Problem List: Decreased strength, Decreased endurance, Impaired balance, Decreased mobility, Decreased cognition, Obesity  Assessment: pt demonstrated improved functional mobility & endurance compared to prior session, but remains limited compared to baseline prior to recent admisions.  She perfomrd transfers to & from recliner without need for assist, then ambualted similar distances that she would be required to manage upon return to her [facility] per discussion with pt where she must walk 1 hallway, then elevator, +2nd hallway to access dining hood.  She requires excessive time to do so today with notable dyspnea & complaints of fatigue during seated rests that does resolve prior to ambulating again.  Pt would normally be expected to do so multiple times at home without assist.  Although she is making considerable progress towards baseline this date, PT POC & d/c recommendations remain appropriate to maximize endurance & promoted increased, safe independence upon return to home facility.  If she is to return home, it would be beneficial for pt to have at least temporarily increased assist to ensure safety within home environment as she continues to recover.        Rehab Resource Intensity Level, PT: II (Moderate Resource Intensity)    See flowsheet documentation for full assessment.

## 2024-10-27 NOTE — OCCUPATIONAL THERAPY NOTE
Occupational Therapy Treatment Note:      10/27/24 1515   OT Last Visit   OT Visit Date 10/27/24   Note Type   Note Type Treatment   Pain Assessment   Pain Assessment Tool 0-10   Pain Score 7   Restrictions/Precautions   Other Precautions   (new indwelling ahn catheter)   ADL   UB Dressing Assistance 5  Supervision/Setup   LB Dressing Assistance 4  Minimal Assistance   LB Dressing Comments pt sits on lower chair for dressing pta she walked prior to adl attempt and was tired to transfer again to lower surface at this time. she was able to mark r le 'herself but needed mod asst for left leg. pt now has catheter and will need asst to manage same   Functional Standing Tolerance   Time f balance c rw using regular rw. pt has rollator in gal[place]o   Bed Mobility   Additional Comments pt uses lift type chair to sleep   Transfers   Sit to Stand 5  Supervision   Additional items Assist x 1  (rw)   Stand to Sit 5  Supervision   Additional items Assist x 1   Functional Mobility   Functional Mobility 4  Minimal assistance  (pt required close supervision to min asst  depending on need for  dynamic reach etc. pt reports not having asst to from dinning room which is 200 from apartment including an elevator ride.)   Additional items Rolling walker   Cognition   Overall Cognitive Status WFL   Activity Tolerance   Activity Tolerance Patient limited by fatigue   Assessment   Assessment pt participated in pm ot session and was seen focusing on  adl's dynamic balance, activity tolerence and education. pt required close sba to occasional min asst for mobility with rw, transfers from chair and for lb dressing. pt now has indwelling catheter which she did not have prior and is noted to have water blisters on r lower leg. pt is cooperative and motivated. she reports only have nsg asst x 1xwk for showers otherwise needs to dress and toilet herself. pt also resides far from dinning room and prior to this admission had to sit and rest on  rollator seat only when fatigued and sob from fluid buildup per pt report. do feel pt should persue in pt rehab inorder to maximize her activity tolerence and adl / mobility status before returning to her nearly independent [facility] apartment. pt may require more asst in apartment upon return   Plan   Treatment Interventions ADL retraining;Functional transfer training;Endurance training;Patient/family training;Equipment evaluation/education;Activityengagement   Goal Expiration Date 11/09/24   OT Treatment Day 1   OT Frequency 2-3x/wk   Discharge Recommendation   Rehab Resource Intensity Level, OT II (Moderate Resource Intensity)   AM-PAC Daily Activity Inpatient   Lower Body Dressing 2   Bathing 2   Toileting 2   Upper Body Dressing 3   Grooming 3   Eating 4   Daily Activity Raw Score 16   Daily Activity Standardized Score (Calc for Raw Score >=11) 35.96   AM-PAC Applied Cognition Inpatient   Following a Speech/Presentation 3   Understanding Ordinary Conversation 4   Taking Medications 2   Remembering Where Things Are Placed or Put Away 3   Remembering List of 4-5 Errands 2   Taking Care of Complicated Tasks 2   Applied Cognition Raw Score 16   Applied Cognition Standardized Score 35.03     April A Storm

## 2024-10-27 NOTE — CASE MANAGEMENT
Case Management Discharge Planning Note    Patient name Mattie Varela  Location /-01 MRN [id number]  : 1945 Date 10/27/2024       Current Admission Date: 10/25/2024  Current Admission Diagnosis:Klebsiella cystitis   Patient Active Problem List    Diagnosis Date Noted Date Diagnosed    Chronic heart failure with preserved ejection fraction (HFpEF) ([facility]) 10/25/2024     Klebsiella cystitis 10/25/2024     Acute kidney injury superimposed on chronic kidney disease  ([facility]) 10/18/2024     Acute low back pain 10/13/2024     Chest pain 10/11/2024     Acute cystitis 10/11/2024     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     Generalized edema 2024     Constipation 2024     Leg pain, right 2024     Acute on chronic diastolic congestive heart failure ([facility]) 2024     s/p Med[employer]onic loop recorder 3/2/2024 2024     Moderate protein-calorie malnutrition ([facility]) 2024     Hypertensive urgency 2024     AMS (altered mental status) 2024     Encounter for examination for admission to nursing home 2024     Stage 3a chronic kidney disease ([facility]) 01/10/2024     Left ventricular outflow obstruction 2024     Obesity, Class I, BMI 30-34.9 2024     Urinary retention 2023     SADAF (acute kidney injury) ([facility]) 2023     Exertional shortness of breath 2023     Non obstructive CAD 11/15/2023     History of lumbar surgery 11/10/2023     Abnormal urinalysis 2023     Chronic obstructive pulmonary disease, unspecified COPD type ([facility]) 2023     Confusion with body shakes and blank stare 2023     Anemia in stage 3a chronic kidney disease  ([facility]) 2023     Lower extremity edema 2023     Cerebrovascular accident (CVA), unspecified mechanism ([facility]) 2022     Stroke-like symptoms 2022     Prepyloric ulcer 2021     Diarrhea 2021     Mild intermittent asthma without complication 2020     S/P insertion of  spinal cord stimulator 07/18/2018     Iron deficiency anemia secondary to inadequate dietary iron intake 06/19/2018     Type 2 diabetes mellitus with other skin complications (HCC)      Failed back surgical syndrome 03/16/2018     Hypothyroidism 11/20/2017     Degenerative lumbar spinal stenosis 01/25/2017     Glaucoma 01/06/2017     Overactive bladder 08/04/2016     Dyspepsia 02/09/2016     Hypercholesterolemia 02/09/2016     Anxiety 02/09/2015     Chronic pain disorder 12/18/2013     Hypertension 06/12/2013       LOS (days): 2  Geometric Mean LOS (GMLOS) (days):   Days to GMLOS:     OBJECTIVE:  Risk of Unplanned Readmission Score: 36.27         Current admission status: Inpatient   Preferred Pharmacy:   [facility] [address]  [address]  [address] [place] [id number]  Phone: [phone] Fax: [phone]    [facility] [place] [place] - [address]  [facility],  [address]  [facility],  First Floor [facility] [address]  Phone: [phone] Fax: [phone]    [facility] [place] [place] - [address]  [address]  [place] [address]  Phone: [phone] Fax: [phone]    Primary Care Provider: NANY Pollock    Primary Insurance: [employer] [facility]  Secondary Insurance:     DISCHARGE DETAILS: This CM spoke with daughter regarding DCP and that CM attempted to call [facility] x2.  PT will work with patient this afternoon, attempting to ambulate.  CM will make contact with [facility] [employer] and [employer], as well as [employer].

## 2024-10-27 NOTE — PHYSICAL THERAPY NOTE
Physical Therapy Progress Note     10/27/24 1510   PT Last Visit   PT Visit Date 10/27/24   Note Type   Note Type Treatment   Pain Assessment   Pain Assessment Tool 0-10   Pain Score 7   Pain Location/Orientation Location: Back;Orientation: Lower   Hospital Pain Intervention(s) Medication (See MAR);Repositioned;Ambulation/increased activity;Rest   Restrictions/Precautions   Other Precautions Fall Risk;Pain;Multiple lines  (Bateman catheter)   Subjective   Subjective Pt encountered seated in recliner, plesant and agreeable to treatment.  Reports pain in back & LEs which is improved with pain meds as provided by RN & with ambulation this session.  She does report SOB with activity, but recovers with seated rests.  No change in status during session.   Transfers   Sit to Stand 5  Supervision   Additional items Assist x 1;Armrests;Increased time required   Stand to Sit 5  Supervision   Additional items Assist x 1;Armrests;Increased time required   Ambulation/Elevation   Gait pattern Short stride;Foward flexed;Inconsistent jorge;Shuffling;Decreased foot clearance;Antalgic;Improper Weight shift   Gait Assistance 4  Minimal assist   Additional items Assist x 1  (+ chair follow)   Assistive Device Rolling walker   Distance 100' x 2   Balance   Static Sitting Fair -   Static Standing Fair -   Ambulatory Poor +   Activity Tolerance   Activity Tolerance Patient tolerated treatment well;Patient limited by fatigue;Patient limited by pain   Nurse Made Aware yes   Assessment   Prognosis Fair   Problem List Decreased strength;Decreased endurance;Impaired balance;Decreased mobility;Decreased cognition;Obesity   Assessment pt demonstrated improved functional mobility & endurance compared to prior session, but remains limited compared to baseline prior to recent admisions.  She perfomrd transfers to & from recliner without need for assist, then ambualted similar distances that she would be required to manage upon return to her [facility] per  discussion with pt where she must walk 1 hallway, then elevator, +2nd hallway to access dining hood.  She requires excessive time to do so today with notable dyspnea & complaints of fatigue during seated rests that does resolve prior to ambulating again.  Pt would normally be expected to do so multiple times at home without assist.  Although she is making considerable progress towards baseline this date, PT POC & d/c recommendations remain appropriate to maximize endurance & promoted increased, safe independence upon return to home facility.  If she is to return home, it would be beneficial for pt to have at least temporarily increased assist to ensure safety within home environment as she continues to recover.   Goals   Patient Goals to get better   STG Expiration Date 11/05/24   PT Treatment Day 1   Plan   Treatment/Interventions Functional transfer training;LE strengthening/ROM;Therapeutic exercise;Endurance training;Patient/family training;Equipment eval/education;Bed mobility;Gait training   Progress Progressing toward goals   PT Frequency 2-3x/wk   Discharge Recommendation   Rehab Resource Intensity Level, PT II (Moderate Resource Intensity)   Equipment Recommended Walker   Walker Package Recommended Wheeled walker   AM-PAC Basic Mobility Inpatient   Turning in Flat Bed Without Bedrails 3   Lying on Back to Sitting on Edge of Flat Bed Without Bedrails 2   Moving Bed to Chair 3   Standing Up From Chair Using Arms 3   Walk in Room 3   Climb 3-5 Stairs With Railing 1   Basic Mobility Inpatient Raw Score 15   Basic Mobility Standardized Score 36.97   [facility] Highest Level Of Mobility   -HLM Goal 4: Move to chair/commode   -HLM Achieved 7: Walk 25 feet or more       Smith Salas PTA    An [facility] Basic Mobility Raw Score less than 17 suggests pt would benefit from post acute rehab.  Please also refer to the recommendation of the Physical Therapist for safe discharge planning.

## 2024-10-27 NOTE — CASE MANAGEMENT
Case Management Discharge Planning Note    Patient name Mattie Varela  Location /-01 MRN [id number]  : 1945 Date 10/27/2024       Current Admission Date: 10/25/2024  Current Admission Diagnosis:Klebsiella cystitis   Patient Active Problem List    Diagnosis Date Noted Date Diagnosed    Chronic heart failure with preserved ejection fraction (HFpEF) ([facility]) 10/25/2024     Klebsiella cystitis 10/25/2024     Acute kidney injury superimposed on chronic kidney disease  ([facility]) 10/18/2024     Acute low back pain 10/13/2024     Chest pain 10/11/2024     Acute cystitis 10/11/2024     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     Generalized edema 2024     Constipation 2024     Leg pain, right 2024     Acute on chronic diastolic congestive heart failure ([facility]) 2024     s/p Medtronic loop recorder 3/2/2024 2024     Moderate protein-calorie malnutrition ([facility]) 2024     Hypertensive urgency 2024     AMS (altered mental status) 2024     Encounter for examination for admission to nursing home 2024     Stage 3a chronic kidney disease ([facility]) 01/10/2024     Left ventricular outflow obstruction 2024     Obesity, Class I, BMI 30-34.9 2024     Urinary retention 2023     SADAF (acute kidney injury) ([facility]) 2023     Exertional shortness of breath 2023     Non obstructive CAD 11/15/2023     History of lumbar surgery 11/10/2023     Abnormal urinalysis 2023     Chronic obstructive pulmonary disease, unspecified COPD type ([facility]) 2023     Confusion with body shakes and blank stare 2023     Anemia in stage 3a chronic kidney disease  ([facility]) 2023     Lower extremity edema 2023     Cerebrovascular accident (CVA), unspecified mechanism ([facility]) 2022     Stroke-like symptoms 2022     Prepyloric ulcer 2021     Diarrhea 2021     Mild intermittent asthma without complication 2020     S/P insertion of  spinal cord stimulator 07/18/2018     Iron deficiency anemia secondary to inadequate dietary iron intake 06/19/2018     Type 2 diabetes mellitus with other skin complications (HCC)      Failed back surgical syndrome 03/16/2018     Hypothyroidism 11/20/2017     Degenerative lumbar spinal stenosis 01/25/2017     Glaucoma 01/06/2017     Overactive bladder 08/04/2016     Dyspepsia 02/09/2016     Hypercholesterolemia 02/09/2016     Anxiety 02/09/2015     Chronic pain disorder 12/18/2013     Hypertension 06/12/2013       LOS (days): 2  Geometric Mean LOS (GMLOS) (days):   Days to GMLOS:     OBJECTIVE:  Risk of Unplanned Readmission Score: 36.27         Current admission status: Inpatient   Preferred Pharmacy:   [facility] [address]  [address]  [address] [place] [address]  Phone: [phone] Fax: [phone]    [facility] [place] - [address]  [facility],  [address]  [facility],  First Floor [facility] [address]  Phone: [phone] Fax: [phone]    [facility] [place] - [address]  [address]  [place] [address]  Phone: [phone] Fax: [phone]    Primary Care Provider: NANY Pollock    Primary Insurance: [employer] [facility]  Secondary Insurance:     DISCHARGE DETAILS: TC to [facility], left VM x2 to call this  back

## 2024-10-27 NOTE — ASSESSMENT & PLAN NOTE
Wt Readings from Last 3 Encounters:   10/18/24 74.3 kg (163 lb 12.8 oz)   10/02/24 85.5 kg (188 lb 9.6 oz)   09/26/24 86.2 kg (190 lb)   Patient recently admitted at [facility] for acute exacerbation of this improved with IV diuretics, discharged on torsemide 20 mg daily  Continue torsemide 30 mg daily

## 2024-10-27 NOTE — ASSESSMENT & PLAN NOTE
Lab Results   Component Value Date    EGFR 43 10/27/2024    EGFR 41 10/26/2024    EGFR 36 10/25/2024    CREATININE 1.18 10/27/2024    CREATININE 1.22 10/26/2024    CREATININE 1.37 (H) 10/25/2024   Baseline creatinine appearing 1.1-1.3, renal function currently near baseline.  Monitor with BMP  Avoid/limit nephrotoxins and avoid hypotension, renally dose medications as needed

## 2024-10-27 NOTE — ASSESSMENT & PLAN NOTE
PDMP and facility paperwork reviewed, patient on Percocet 10/325 mg as needed, continue home dosing   Statement Selected

## 2024-10-28 LAB
ANION GAP SERPL CALCULATED.3IONS-SCNC: 8 MMOL/L (ref 4–13)
BUN SERPL-MCNC: 23 MG/DL (ref 5–25)
CALCIUM SERPL-MCNC: 9.2 MG/DL (ref 8.4–10.2)
CHLORIDE SERPL-SCNC: 100 MMOL/L (ref 96–108)
CO2 SERPL-SCNC: 30 MMOL/L (ref 21–32)
CREAT SERPL-MCNC: 1.2 MG/DL (ref 0.6–1.3)
ERYTHROCYTE [DISTWIDTH] IN BLOOD BY AUTOMATED COUNT: 13.7 % (ref 11.6–15.1)
GFR SERPL CREATININE-BSD FRML MDRD: 42 ML/MIN/1.73SQ M
GLUCOSE SERPL-MCNC: 142 MG/DL (ref 65–140)
GLUCOSE SERPL-MCNC: 156 MG/DL (ref 65–140)
GLUCOSE SERPL-MCNC: 183 MG/DL (ref 65–140)
GLUCOSE SERPL-MCNC: 184 MG/DL (ref 65–140)
GLUCOSE SERPL-MCNC: 224 MG/DL (ref 65–140)
HCT VFR BLD AUTO: 31.6 % (ref 34.8–46.1)
HGB BLD-MCNC: 10 G/DL (ref 11.5–15.4)
MCH RBC QN AUTO: 28.9 PG (ref 26.8–34.3)
MCHC RBC AUTO-ENTMCNC: 31.6 G/DL (ref 31.4–37.4)
MCV RBC AUTO: 91 FL (ref 82–98)
PLATELET # BLD AUTO: 307 THOUSANDS/UL (ref 149–390)
PMV BLD AUTO: 9.9 FL (ref 8.9–12.7)
POTASSIUM SERPL-SCNC: 3.9 MMOL/L (ref 3.5–5.3)
RBC # BLD AUTO: 3.46 MILLION/UL (ref 3.81–5.12)
SODIUM SERPL-SCNC: 138 MMOL/L (ref 135–147)
WBC # BLD AUTO: 6.13 THOUSAND/UL (ref 4.31–10.16)

## 2024-10-28 PROCEDURE — 82948 REAGENT STRIP/BLOOD GLUCOSE: CPT

## 2024-10-28 PROCEDURE — 99233 SBSQ HOSP IP/OBS HIGH 50: CPT

## 2024-10-28 PROCEDURE — 85027 COMPLETE CBC AUTOMATED: CPT

## 2024-10-28 PROCEDURE — 80048 BASIC METABOLIC PNL TOTAL CA: CPT

## 2024-10-28 RX ADMIN — SENNOSIDES 17.2 MG: 8.6 TABLET, FILM COATED ORAL at 21:16

## 2024-10-28 RX ADMIN — HEPARIN SODIUM 5000 UNITS: 5000 INJECTION INTRAVENOUS; SUBCUTANEOUS at 21:17

## 2024-10-28 RX ADMIN — HEPARIN SODIUM 5000 UNITS: 5000 INJECTION INTRAVENOUS; SUBCUTANEOUS at 05:05

## 2024-10-28 RX ADMIN — METOPROLOL SUCCINATE 50 MG: 50 TABLET, EXTENDED RELEASE ORAL at 08:23

## 2024-10-28 RX ADMIN — ASPIRIN 81 MG CHEWABLE TABLET 81 MG: 81 TABLET CHEWABLE at 08:23

## 2024-10-28 RX ADMIN — PANTOPRAZOLE SODIUM 40 MG: 40 TABLET, DELAYED RELEASE ORAL at 08:23

## 2024-10-28 RX ADMIN — TEMAZEPAM 15 MG: 15 CAPSULE ORAL at 00:05

## 2024-10-28 RX ADMIN — HEPARIN SODIUM 5000 UNITS: 5000 INJECTION INTRAVENOUS; SUBCUTANEOUS at 14:18

## 2024-10-28 RX ADMIN — METHOCARBAMOL 500 MG: 500 TABLET ORAL at 23:12

## 2024-10-28 RX ADMIN — LIDOCAINE 1 PATCH: 50 PATCH CUTANEOUS at 08:24

## 2024-10-28 RX ADMIN — INSULIN LISPRO 2 UNITS: 100 INJECTION, SOLUTION INTRAVENOUS; SUBCUTANEOUS at 11:25

## 2024-10-28 RX ADMIN — DOCUSATE SODIUM 100 MG: 100 CAPSULE, LIQUID FILLED ORAL at 17:08

## 2024-10-28 RX ADMIN — SUCRALFATE 1 G: 1 TABLET ORAL at 17:08

## 2024-10-28 RX ADMIN — TEMAZEPAM 15 MG: 15 CAPSULE ORAL at 23:11

## 2024-10-28 RX ADMIN — OXYCODONE HYDROCHLORIDE 10 MG: 10 TABLET ORAL at 21:16

## 2024-10-28 RX ADMIN — TORSEMIDE 30 MG: 20 TABLET ORAL at 08:25

## 2024-10-28 RX ADMIN — LEVOTHYROXINE SODIUM 37.5 MCG: 75 TABLET ORAL at 05:05

## 2024-10-28 RX ADMIN — ACETAMINOPHEN 975 MG: 325 TABLET, FILM COATED ORAL at 05:05

## 2024-10-28 RX ADMIN — OXYCODONE HYDROCHLORIDE 10 MG: 10 TABLET ORAL at 08:24

## 2024-10-28 RX ADMIN — ACETAMINOPHEN 975 MG: 325 TABLET, FILM COATED ORAL at 21:16

## 2024-10-28 RX ADMIN — ATORVASTATIN CALCIUM 40 MG: 40 TABLET, FILM COATED ORAL at 08:23

## 2024-10-28 RX ADMIN — ACETAMINOPHEN 975 MG: 325 TABLET, FILM COATED ORAL at 14:18

## 2024-10-28 RX ADMIN — INSULIN LISPRO 1 UNITS: 100 INJECTION, SOLUTION INTRAVENOUS; SUBCUTANEOUS at 21:25

## 2024-10-28 RX ADMIN — SUCRALFATE 1 G: 1 TABLET ORAL at 08:23

## 2024-10-28 RX ADMIN — METHOCARBAMOL 500 MG: 500 TABLET ORAL at 08:24

## 2024-10-28 RX ADMIN — DOCUSATE SODIUM 100 MG: 100 CAPSULE, LIQUID FILLED ORAL at 08:23

## 2024-10-28 RX ADMIN — INSULIN LISPRO 1 UNITS: 100 INJECTION, SOLUTION INTRAVENOUS; SUBCUTANEOUS at 17:08

## 2024-10-28 NOTE — ASSESSMENT & PLAN NOTE
Lab Results   Component Value Date    EGFR 42 10/28/2024    EGFR 43 10/27/2024    EGFR 41 10/26/2024    CREATININE 1.20 10/28/2024    CREATININE 1.18 10/27/2024    CREATININE 1.22 10/26/2024   Baseline creatinine appearing 1.1-1.3, renal function currently near baseline.  Monitor with BMP  Avoid/limit nephrotoxins and avoid hypotension, renally dose medications as needed

## 2024-10-28 NOTE — CASE MANAGEMENT
Case Management Discharge Planning Note    Patient name Mtatie Varela  Location /-01 MRN [id number]  : 1945 Date 10/28/2024       Current Admission Date: 10/25/2024  Current Admission Diagnosis:Klebsiella cystitis   Patient Active Problem List    Diagnosis Date Noted Date Diagnosed    Chronic heart failure with preserved ejection fraction (HFpEF) ([facility]) 10/25/2024     Klebsiella cystitis 10/25/2024     Acute kidney injury superimposed on chronic kidney disease  ([facility]) 10/18/2024     Acute low back pain 10/13/2024     Chest pain 10/11/2024     Acute cystitis 10/11/2024     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     Generalized edema 2024     Constipation 2024     Leg pain, right 2024     Acute on chronic diastolic congestive heart failure ([facility]) 2024     s/p Medtronic loop recorder 3/2/2024 2024     Moderate protein-calorie malnutrition ([facility]) 2024     Hypertensive urgency 2024     AMS (altered mental status) 2024     Encounter for examination for admission to nursing home 2024     Stage 3a chronic kidney disease ([facility]) 01/10/2024     Left ventricular outflow obstruction 2024     Obesity, Class I, BMI 30-34.9 2024     Urinary retention 2023     SADAF (acute kidney injury) ([facility]) 2023     Exertional shortness of breath 2023     Non obstructive CAD 11/15/2023     History of lumbar surgery 11/10/2023     Abnormal urinalysis 2023     Chronic obstructive pulmonary disease, unspecified COPD type ([facility]) 2023     Confusion with body shakes and blank stare 2023     Anemia in stage 3a chronic kidney disease  ([facility]) 2023     Lower extremity edema 2023     Cerebrovascular accident (CVA), unspecified mechanism ([facility]) 2022     Stroke-like symptoms 2022     Prepyloric ulcer 2021     Diarrhea 2021     Mild intermittent asthma without complication 2020     S/P insertion of  spinal cord stimulator 07/18/2018     Iron deficiency anemia secondary to inadequate dietary iron intake 06/19/2018     Type 2 diabetes mellitus with other skin complications (HCC)      Failed back surgical syndrome 03/16/2018     Hypothyroidism 11/20/2017     Degenerative lumbar spinal stenosis 01/25/2017     Glaucoma 01/06/2017     Overactive bladder 08/04/2016     Dyspepsia 02/09/2016     Hypercholesterolemia 02/09/2016     Anxiety 02/09/2015     Chronic pain disorder 12/18/2013     Hypertension 06/12/2013       LOS (days): 3  Geometric Mean LOS (GMLOS) (days): 2.8  Days to GMLOS:0     OBJECTIVE:  Risk of Unplanned Readmission Score: 33.42         Current admission status: Inpatient   Preferred Pharmacy:   [facility] [address]  [address]  [address] [address]  Phone: [phone] Fax: [phone]    [facility] [place] - [address]  [facility],  [address]  [facility],  First Floor [facility] [address]  Phone: [phone] Fax: [phone]    [facility] [place], [place] - [address]  [address]  [place] [address]  Phone: [phone] Fax: [phone]    Primary Care Provider: NANY Pollock    Primary Insurance: [employer] [facility]  Secondary Insurance:     DISCHARGE DETAILS: TC to patient's daughter, explaining the situation regarding return to [facility] vs SNF.  Daughter states she will call Dr Clark with [facility] at [phone], inquiring as to whether Dr Clark can advocate for patient's return to [facility], will await response    This CM discussed alternative plan, daughter agreeable to this CM placing referral to [facility], in the event that [facility] will not take patient back in current state, referral entered in [facility]                                          Other Referral/Resources/Interventions Provided:  Referral Comments: TC to Paul Conner, regarding patient's return to [facility] v [facility],  this CM was inquiring as to whether [facility] can communicate with [facility] and attempt to get patient to be able to go back to [facility] rather then [facility] states that she can speak with the [facility] nurse and provider, and call this CM back regarding dispo plan.

## 2024-10-28 NOTE — UTILIZATION REVIEW
Initial Clinical Review    Admission: Date/Time/Statement:   Admission Orders (From admission, onward)       Ordered        10/25/24 2034  INPATIENT ADMISSION  Once                          Orders Placed This Encounter   Procedures    INPATIENT ADMISSION     Standing Status:   Standing     Number of Occurrences:   1     Order Specific Question:   Level of Care     Answer:   Med Surg [16]     Order Specific Question:   Estimated length of stay     Answer:   More than 2 Midnights     Order Specific Question:   Certification     Answer:   I certify that inpatient services are medically necessary for this patient for a duration of greater than two midnights. See H&P and MD Progress Notes for additional information about the patient's course of treatment.     ED Arrival Information       Expected   -    Arrival   10/25/2024 19:20    Acuity   Urgent              Means of arrival   Ambulance    Escorted by   [facility]    Service   Hospitalist    Admission type   Emergency              Arrival complaint   Abnormal Labs             Chief Complaint   Patient presents with    Abnormal Lab     Sent from [facility] for abnormal labs that were drawn on 10/23. Pt reports having repeated UTI's which the nursing home didn't have proper antibiotics to treat. Pt reports having a ahn removed two days ago due to blood in her urine. Pt also reports burning when she pees and worsening back spasms.        Initial Presentation: 79 y.o. female who presented from SNF rehab by EMS to [facility] ED. Admitted as Inpatient for evaluation and treatment of Klebsiella cystitis. PMHx: anemia, arthritis, asthma, basal cell carcinoma, chronic narcotic dependence r/t chronic pain, T2DM, gastroparesis, GERD, CHF, CKD S3a, HLD, HTN, IBS, s/p spinal cord stimulator (7/2018), s/p loop recorder (3/2024), stroke. Presented w/ acute cystitis w/ outpatient urine culture positive for ESBL Klebsiella pneumonia.  Sensitivity shows resistant to all but ertapenem and meropenem.   EXAM: suprapubic discomfort to palpation, b/l LE edema and venous stasis dermatitis. Labs: Crt 1.37 (baseline 1.1-1.3).   PLAN: IV ertapenem, follow repeat urine culture obtained in ED, trend WBC, temp curve, hemodynamics; continue PTA meds, SSI w/ BG checks ACHS, Trend labs, replete electrolytes as needed, up & OOB as tolerated, I&O. Infectious Diseases consulted.    Anticipated Length of Stay/Certification Statement: Patient will be admitted on an Inpatient basis with an anticipated length of stay of greater than 2 midnights.         Date: 10/26   Day 2: Reports burning w/ urination. Plan: continue current meds, PT/OT evals. PT & OT both recommending SNF rehab. Trend labs, replete electrolytes as needed. Insert ahn s/t continued urinary retention.     Infectious Diseases: Pt Urine cultures growing 100K ESBL Klebsiella pneumoniae resistant to oral antibiotics. IV ertapenem currently; recommend IV amikacin 15 mg/kg ABW to avoid resistance to carbapenem; ahn catheter to be replaced for ongoing retention, Tight glycemic control (HgbA1c 7.4), Trend labs, replete electrolytes as needed.      Date: 10/27  Day 3: Has surpassed a 2nd midnight with active treatments and services. Pt notes sleeping well. Exam: ahn in place. Plan: continue current meds including IV amikacin. Maintain ahn. Trial Robaxin in place of Baclofen. SSI w/ BG checks ACHS. Trend labs, replete electrolytes as needed. Pending insurance auth for return to SNF rehab.       ED Treatment-Medication Administration from 10/25/2024 1920 to 10/25/2024 2308         Date/Time Order Dose Route Action     10/25/2024 2035 ertapenem (INVanz) 1,000 mg in sodium chloride 0.9 % 50 mL IVPB 1,000 mg Intravenous New Bag     10/25/2024 2148 acetaminophen (TYLENOL) tablet 975 mg 975 mg Oral Given     10/25/2024 2146 docusate sodium (COLACE) capsule 100 mg 100 mg Oral Given     10/25/2024 2148 metoprolol  succinate (TOPROL-XL) 24 hr tablet 50 mg 50 mg Oral Given     10/25/2024 2151 heparin (porcine) subcutaneous injection 5,000 Units 5,000 Units Subcutaneous Given     10/25/2024 2153 insulin lispro (HumALOG/ADMELOG) 100 units/mL subcutaneous injection 1-5 Units 1 Units Subcutaneous Given            Scheduled Medications:  acetaminophen, 975 mg, Oral, Q8H ARY  aspirin, 81 mg, Oral, Daily  atorvastatin, 40 mg, Oral, Daily  docusate sodium, 100 mg, Oral, BID  heparin (porcine), 5,000 Units, Subcutaneous, Q8H ARY  insulin lispro, 1-5 Units, Subcutaneous, HS  insulin lispro, 1-6 Units, Subcutaneous, TID AC  latanoprost, 1 drop, Both Eyes, HS  levothyroxine, 37.5 mcg, Oral, Early Morning  lidocaine, 1 patch, Topical, Daily  metoprolol succinate, 50 mg, Oral, Q12H ARY  Milnacipran HCl, 1 tablet, Oral, Daily  pantoprazole, 40 mg, Oral, Daily  senna, 17.2 mg, Oral, HS  sucralfate, 1 g, Oral, BID AC  torsemide, 30 mg, Oral, Daily    Continuous IV Infusions: none    PRN Meds:  albuterol, 2 puff, Inhalation, Q6H PRN  aluminum-magnesium hydroxide-simethicone, 30 mL, Oral, Q6H PRN  baclofen, 10 mg, Oral, BID PRN; 10/26 x2  HYDROmorphone, 0.5 mg, Intravenous, Q3H PRN  methocarbamol, 500 mg, Oral, Q6H PRN; 10/27 x1  ondansetron, 4 mg, Intravenous, Q6H PRN  oxyCODONE, 5 mg, Oral, Q6H PRN  oxyCODONE, 10 mg, Oral, Q6H PRN; 10/25 x1, 10/26 x2, 10/27 x2  sodium chloride, 1 spray, Each Nare, Q1H PRN  temazepam, 15 mg, Oral, HS PRN; 10/25 x1, 10/26 x1    amikacin (AMIKIN) 850 mg in sodium chloride 0.9 % 100 mL IVPB  Dose: 15 mg/kg  Weight Dosing Info: 57 kg (Adjusted)  Freq: Every 24 hours Route: IV  Last Dose: Stopped (10/27/24 2132)  Start: 10/26/24 1400 End: 10/27/24 1704    ED Triage Vitals   Temperature Pulse Respirations Blood Pressure SpO2 Pain Score   10/25/24 1927 10/25/24 1927 10/25/24 1927 10/25/24 1927 10/25/24 1927 10/25/24 2148   98.3 °F (36.8 °C) 82 16 151/76 96 % 9       Vital Signs (last 3 days)       Date/Time Temp Pulse  Resp BP MAP (mmHg) SpO2 O2 Device Pain    10/28/24 0824 -- -- -- -- -- -- -- 7    10/28/24 07:21:49 98.1 °F (36.7 °C) 73 -- 131/63 86 97 % -- --    10/27/24 2300 -- -- -- -- -- -- None (Room air) --    10/27/24 21:59:32 98.8 °F (37.1 °C) 91 17 115/61 79 97 % None (Room air) --    10/27/24 2141 -- -- -- -- -- -- -- 8    10/27/24 2137 -- -- -- -- -- -- -- 8    10/27/24 1533 -- -- -- -- -- -- None (Room air) 7    10/27/24 1515 -- -- -- -- -- -- -- 7    10/27/24 1510 -- -- -- -- -- -- -- 7    10/27/24 15:03:32 98.5 °F (36.9 °C) 89 17 117/56 76 99 % None (Room air) --    10/27/24 1433 -- -- -- -- -- -- -- 8    10/27/24 07:21:53 98.1 °F (36.7 °C) 72 16 106/48 67 96 % -- --    10/26/24 22:08:11 98.1 °F (36.7 °C) 86 18 115/52 73 93 % -- --    10/26/24 1953 -- -- -- -- -- -- -- No Pain    10/26/24 1715 -- -- -- -- -- -- -- 6    10/26/24 15:45:26 -- 75 18 -- -- 88 % -- --    10/26/24 1338 -- -- -- -- -- -- -- 7    10/26/24 1135 -- -- -- -- -- -- -- 8    10/26/24 1100 -- -- -- 118/55 -- -- -- --    10/26/24 0800 -- -- -- -- -- -- -- No Pain    10/26/24 07:14:19 97.3 °F (36.3 °C) 80 16 129/60 83 94 % -- --    10/25/24 2325 -- -- -- -- -- -- -- 9    10/25/24 2215 -- 82 -- -- -- 100 % None (Room air) --    10/25/24 2200 -- -- -- 139/64 92 -- -- --    10/25/24 2148 -- 83 -- 134/63 -- -- -- 9    10/25/24 2100 -- 82 18 120/59 83 100 % None (Room air) --    10/25/24 1930 -- 82 16 151/76 105 97 % None (Room air) --    10/25/24 1927 98.3 °F (36.8 °C) 82 16 151/76 105 96 % None (Room air) --              Pertinent Labs/Diagnostic Test Results:     Results from last 7 days   Lab Units 10/28/24  0515 10/27/24  0453 10/26/24  0520 10/25/24  2000   WBC Thousand/uL 6.13 5.21 9.31 8.37   HEMOGLOBIN g/dL 10.0* 9.9* 10.1* 11.0*   HEMATOCRIT % 31.6* 31.5* 32.2* 35.1   PLATELETS Thousands/uL 307 295 278 327   TOTAL NEUT ABS Thousands/µL  --   --   --  5.17         Results from last 7 days   Lab Units 10/28/24  0515 10/27/24  0453 10/26/24  0520  10/25/24  2000   SODIUM mmol/L 138 138 135 135   POTASSIUM mmol/L 3.9 3.7 4.0 4.0   CHLORIDE mmol/L 100 99 97 94*   CO2 mmol/L 30 29 29 32   ANION GAP mmol/L 8 10 9 9   BUN mg/dL 23 29* 32* 36*   CREATININE mg/dL 1.20 1.18 1.22 1.37*   EGFR ml/min/1.73sq m 42 43 41 36   CALCIUM mg/dL 9.2 9.0 8.9 9.5         Results from last 7 days   Lab Units 10/28/24  0753 10/27/24  2034 10/27/24  1552 10/27/24  1057 10/27/24  0615 10/26/24  2034 10/26/24  1628 10/26/24  1104 10/26/24  0607 10/25/24  2157   POC GLUCOSE mg/dl 142* 237* 139 193* 150* 131 131 142* 126 156*     Results from last 7 days   Lab Units 10/28/24  0515 10/27/24  0453 10/26/24  0520 10/25/24  2000   GLUCOSE RANDOM mg/dL 183* 127 127 191*      Results from last 7 days   Lab Units 10/25/24  2042   URINE CULTURE  >100,000 cfu/ml Klebsiella pneumoniae ESBL*  10,000-19,000 cfu/ml         Past Medical History:   Diagnosis Date    Acid reflux     Acute kidney injury (HCC)     Anemia     hx of iron-deficient    Anxiety     Arthritis     Asthma     last needed inhaler last year 2020    Basal cell carcinoma     upper lip    Chronic narcotic dependence (HCC)     Chronic pain     Colon polyp     Cystocele     Diabetes mellitus (HCC)     stable    Disease of thyroid gland     hypothyroidism    Diverticulosis     Dizziness     at times    Dysfunctional uterine bleeding     last assessed - [id number]    Dysphagia     Fibromyalgia     Gastric ulcer     Gastroparesis     History of colonic polyps     last assessed - [id number]    History of gastroesophageal reflux (GERD)     Hypercholesterolemia     Hyperlipidemia     Hypertension     Hyponatremia     IBS (irritable bowel syndrome)     Pneumobilia 06/18/2022    Post laminectomy syndrome     S/P insertion of spinal cord stimulator 07/18/2018    s/p [employer] loop recorder 3/2/2024 03/02/2024    Seasonal allergies     Shortness of breath     exertional    Spinal stenosis     Status post lumbar spinal fusion 03/16/2018    Stroke  ([facility])     pt states slight stroke March 2022     Present on Admission:   Chronic heart failure with preserved ejection fraction (HFpEF) (HCC)   Degenerative lumbar spinal stenosis   Type 2 diabetes mellitus with other skin complications (HCC)   Stage 3a chronic kidney disease (HCC)   Chronic pain disorder   Hypertension   Hypothyroidism   Chronic obstructive pulmonary disease, unspecified COPD type (HCC)   Cerebrovascular accident (CVA), unspecified mechanism (HCC)   Klebsiella cystitis      Admitting Diagnosis: Cystitis [N30.90]  Abnormal laboratory test result [R89.9]  Age/Sex: 79 y.o. female    Network Utilization Review Department  ATTENTION: Please call with any questions or concerns to [phone] and carefully listen to the prompts so that you are directed to the right person. All voicemails are confidential.   For Discharge needs, contact Care Management DC Support Team at [phone] opt. 2  Send all requests for admission clinical reviews, approved or denied determinations and any other requests to dedicated fax number below belonging to the campus where the patient is receiving treatment. List of dedicated fax numbers for the Facilities:  FACILITY NAME UR FAX NUMBER   ADMISSION DENIALS (Administrative/Medical Necessity) [phone]   DISCHARGE SUPPORT TEAM (NETWORK) [phone]   PARENT CHILD HEALTH (Maternity/NICU/Pediatrics) [phone]   [facility] [phone]   [facility] [phone]   [facility] [phone]   [facility] [phone]   [facility] [phone]   [facility] [phone]   [facility] [phone]   [facility] [phone]   [facility] [phone]   [facility] [phone]   [facility]  [facility] [phone]   [facility] [place] [phone]

## 2024-10-28 NOTE — PROGRESS NOTES
Progress Note - Hospitalist   Name: Mattie Varela 79 y.o. female I MRN: [id number]  Unit/Bed#: -01 I Date of Admission: 10/25/2024   Date of Service: 10/28/2024 I Hospital Day: 3    Assessment & Plan  Klebsiella cystitis  Present on admission, patient presenting from SNF with indwelling [place] additionally with complaints of suprapubic tenderness/urinary urgency/burning with urination.  Patient otherwise afebrile and nontoxic-appearing  Outpatient urinalysis concerning for acute cystitis, with culture revealing sensitivity only to ertapenem/meropenem.  Sent here for IV antibiotic treatment  Spoke with the daughter who is a PCP with our network Dr. Baires.  Discussed with CM. Patient will need str, pending placement. Possible discharge tomorrow.   Urine culture sent (pic bellow at bottom of note)   Given one dose of amikacin. Will monitor for now. No further AB needed  ID signed off  Medically clear for discharge.  PTOT    Chronic heart failure with preserved ejection fraction (HFpEF) ([facility])  Wt Readings from Last 3 Encounters:   10/18/24 74.3 kg (163 lb 12.8 oz)   10/02/24 85.5 kg (188 lb 9.6 oz)   09/26/24 86.2 kg (190 lb)     Continue torsemide 30 mg daily      Degenerative lumbar spinal stenosis  S/p spinal stimulator although per family no longer working  Continue baclofen and Percocet as needed  Type 2 diabetes mellitus with other skin complications ([facility])    Lab Results   Component Value Date    HGBA1C 7.4 (H) 10/12/2024   Hold home metformin while admitted, start correctional insulin coverage with Accu-Cheks  Carb controlled diet  Initiate hypoglycemia protocol  Chronic pain disorder  PDMP and facility paperwork reviewed, patient on Percocet 10/325 mg as needed, continue home dosing  Hypertension  Blood pressure acceptable, continue PTA metoprolol succinate 50 mg twice daily and torsemide 20 mg daily with strict hold parameters and monitor blood pressure per protocol  Hypothyroidism  Chronic and stable,  continue home dose levothyroxine  Cerebrovascular accident (CVA), unspecified mechanism (HCC)  No new focal deficits appreciated, continue ASA/statin  Chronic obstructive pulmonary disease, unspecified COPD type (HCC)  Not in acute exacerbation, continue as needed albuterol inhaler  Stage 3a chronic kidney disease (HCC)  Lab Results   Component Value Date    EGFR 42 10/28/2024    EGFR 43 10/27/2024    EGFR 41 10/26/2024    CREATININE 1.20 10/28/2024    CREATININE 1.18 10/27/2024    CREATININE 1.22 10/26/2024   Baseline creatinine appearing 1.1-1.3, renal function currently near baseline.  Monitor with BMP  Avoid/limit nephrotoxins and avoid hypotension, renally dose medications as needed    VTE Pharmacologic Prophylaxis: VTE Score: 5 High Risk (Score >/= 5) - Pharmacological DVT Prophylaxis Ordered: heparin. Sequential Compression Devices Ordered.    Mobility:   Basic Mobility Inpatient Raw Score: 15  JH-HLM Goal: 4: Move to chair/commode  JH-HLM Achieved: 4: Move to chair/commode  JH-HLM Goal achieved. Continue to encourage appropriate mobility.    Patient Centered Rounds: I performed bedside rounds with nursing staff today.   Discussions with Specialists or Other Care Team Provider: None    Education and Discussions with Family / Patient: Updated  (daughter) at bedside.    Current Length of Stay: 3 day(s)  Current Patient Status: Inpatient   Certification Statement: The patient will continue to require additional inpatient hospital stay due to Insurance auth  Discharge Plan: Rehab when auth    Code Status: Level 1 - Full Code    Subjective   Patient does not report and headaches, shortness of breath, chest pain, abdominal pain, nausea vomiting, lower leg edema. Also reports good sleep          Objective :  Temp:  [98.1 °F (36.7 °C)-98.8 °F (37.1 °C)] 98.6 °F (37 °C)  HR:  [73-91] 73  BP: (115-131)/(61-64) 120/64  Resp:  [16-17] 16  SpO2:  [97 %] 97 %  O2 Device: None (Room air)    There is no height  or weight on file to calculate BMI.     Input and Output Summary (last 24 hours):     Intake/Output Summary (Last 24 hours) at 10/28/2024 1603  Last data filed at 10/28/2024 1501  Gross per 24 hour   Intake 660 ml   Output 2200 ml   Net -1540 ml       Physical Exam  Vitals and nursing note reviewed.   Constitutional:       General: She is not in acute distress.     Appearance: She is well-developed.   HENT:      Head: Normocephalic and atraumatic.      Nose: Nose normal.      Mouth/Throat:      Mouth: Mucous membranes are moist.   Eyes:      Conjunctiva/sclera: Conjunctivae normal.   Cardiovascular:      Rate and Rhythm: Normal rate and regular rhythm.      Heart sounds: No murmur heard.  Pulmonary:      Effort: Pulmonary effort is normal. No respiratory distress.      Breath sounds: Normal breath sounds.   Abdominal:      Palpations: Abdomen is soft.      Tenderness: There is no abdominal tenderness.   Musculoskeletal:      Cervical back: Neck supple.      Right lower leg: No edema.      Left lower leg: No edema.   Skin:     General: Skin is warm and dry.      Capillary Refill: Capillary refill takes less than 2 seconds.   Neurological:      General: No focal deficit present.      Mental Status: She is alert and oriented to person, place, and time.   Psychiatric:         Mood and Affect: Mood normal.           Lines/Drains:  Lines/Drains/Airways       Active Status       Name Placement date Placement time Site Days    Urethral Catheter 16 Fr. 10/26/24  1311  --  2                            Lab Results: I have reviewed the following results:   Results from last 7 days   Lab Units 10/28/24  0515 10/26/24  0520 10/25/24  2000   WBC Thousand/uL 6.13   < > 8.37   HEMOGLOBIN g/dL 10.0*   < > 11.0*   HEMATOCRIT % 31.6*   < > 35.1   PLATELETS Thousands/uL 307   < > 327   SEGS PCT %  --   --  61   LYMPHO PCT %  --   --  21   MONO PCT %  --   --  8   EOS PCT %  --   --  8*    < > = values in this interval not displayed.      Results from last 7 days   Lab Units 10/28/24  0515   SODIUM mmol/L 138   POTASSIUM mmol/L 3.9   CHLORIDE mmol/L 100   CO2 mmol/L 30   BUN mg/dL 23   CREATININE mg/dL 1.20   ANION GAP mmol/L 8   CALCIUM mg/dL 9.2   GLUCOSE RANDOM mg/dL 183*         Results from last 7 days   Lab Units 10/28/24  1113 10/28/24  0753 10/27/24  2034 10/27/24  1552 10/27/24  1057 10/27/24  0615 10/26/24  2034 10/26/24  1628 10/26/24  1104 10/26/24  0607 10/25/24  2157   POC GLUCOSE mg/dl 224* 142* 237* 139 193* 150* 131 131 142* 126 156*               Recent Cultures (last 7 days):   Results from last 7 days   Lab Units 10/25/24  2042   URINE CULTURE  >100,000 cfu/ml Klebsiella pneumoniae ESBL*  10,000-19,000 cfu/ml       Imaging Results Review: No pertinent imaging studies reviewed.  Other Study Results Review: EKG was reviewed.     Last 24 Hours Medication List:     Current Facility-Administered Medications:     acetaminophen (TYLENOL) tablet 975 mg, Q8H ARY    albuterol (PROVENTIL HFA,VENTOLIN HFA) inhaler 2 puff, Q6H PRN    aluminum-magnesium hydroxide-simethicone (MAALOX) oral suspension 30 mL, Q6H PRN    aspirin chewable tablet 81 mg, Daily    atorvastatin (LIPITOR) tablet 40 mg, Daily    docusate sodium (COLACE) capsule 100 mg, BID    heparin (porcine) subcutaneous injection 5,000 Units, Q8H ARY    HYDROmorphone (DILAUDID) injection 0.5 mg, Q3H PRN    insulin lispro (HumALOG/ADMELOG) 100 units/mL subcutaneous injection 1-5 Units, HS    insulin lispro (HumALOG/ADMELOG) 100 units/mL subcutaneous injection 1-6 Units, TID AC **AND** Fingerstick Glucose (POCT), TID AC    latanoprost (XALATAN) 0.005 % ophthalmic solution 1 drop, HS    levothyroxine tablet 37.5 mcg, Early Morning    lidocaine (LIDODERM) 5 % patch 1 patch, Daily    methocarbamol (ROBAXIN) tablet 500 mg, Q6H PRN    metoprolol succinate (TOPROL-XL) 24 hr tablet 50 mg, Q12H ARY    Milnacipran HCl TABS 100 mg, Daily    ondansetron (ZOFRAN) injection 4 mg, Q6H PRN     oxyCODONE (ROXICODONE) IR tablet 5 mg, Q6H PRN **OR** oxyCODONE (ROXICODONE) immediate release tablet 10 mg, Q6H PRN    pantoprazole (PROTONIX) EC tablet 40 mg, Daily    senna (SENOKOT) tablet 17.2 mg, HS    sodium chloride (OCEAN) 0.65 % nasal spray 1 spray, Q1H PRN    sucralfate (CARAFATE) tablet 1 g, BID AC    temazepam (RESTORIL) capsule 15 mg, HS PRN    torsemide (DEMADEX) tablet 30 mg, Daily    Administrative Statements   Today, Patient Was Seen By: Jean Nix MD  I have spent a total time of 60 minutes in caring for this patient on the day of the visit/encounter including Diagnostic results, Prognosis, Risks and benefits of tx options, Instructions for management, Patient and family education, Importance of tx compliance, Risk factor reductions, Impressions, Counseling / Coordination of care, Documenting in the medical record, Reviewing / ordering tests, medicine, procedures  , Obtaining or reviewing history  , and Communicating with other healthcare professionals .    Long discussion with PT family and patient    **Please Note: This note may have been constructed using a voice recognition system.**

## 2024-10-28 NOTE — PROGRESS NOTES
Patient:  LEO ZAPATA    MRN:  [id number]    Aidin Request ID:  [id number]    Level of care reserved:  Skilled Nursing Facility    Partner Reserved:  [facility]  , [place] [address] [phone]    Clinical needs requested:    Geography searched:  10 miles around [id number]    Start of Service:    Request sent:  3:05pm EDT on 10/28/2024 by Shruti Cadena    Partner reserved:  3:46pm EDT on 10/28/2024 by Shruti Cadena    Choice list shared:  3:46pm EDT on 10/28/2024 by Shruti Cadena

## 2024-10-28 NOTE — PLAN OF CARE
Problem: PAIN - ADULT  Goal: Verbalizes/displays adequate comfort level or baseline comfort level  Description: Interventions:  - Encourage patient to monitor pain and request assistance  - Assess pain using appropriate pain scale  - Administer analgesics based on type and severity of pain and evaluate response  - Implement non-pharmacological measures as appropriate and evaluate response  - Consider cultural and social influences on pain and pain management  - Notify physician/advanced practitioner if interventions unsuccessful or patient reports new pain  Outcome: Progressing     Problem: INFECTION - ADULT  Goal: Absence or prevention of progression during hospitalization  Description: INTERVENTIONS:  - Assess and monitor for signs and symptoms of infection  - Monitor lab/diagnostic results  - Monitor all insertion sites, i.e. indwelling lines, tubes, and drains  - Monitor endotracheal if appropriate and nasal secretions for changes in amount and color  - Scuddy appropriate cooling/warming therapies per order  - Administer medications as ordered  - Instruct and encourage patient and family to use good hand hygiene technique  - Identify and instruct in appropriate isolation precautions for identified infection/condition  Outcome: Progressing  Goal: Absence of fever/infection during neutropenic period  Description: INTERVENTIONS:  - Monitor WBC    Outcome: Progressing     Problem: SAFETY ADULT  Goal: Patient will remain free of falls  Description: INTERVENTIONS:  - Educate patient/family on patient safety including physical limitations  - Instruct patient to call for assistance with activity   - Consult OT/PT to assist with strengthening/mobility   - Keep Call bell within reach  - Keep bed low and locked with side rails adjusted as appropriate  - Keep care items and personal belongings within reach  - Initiate and maintain comfort rounds  - Make Fall Risk Sign visible to staff  - Offer Toileting every 2 Hours,  in advance of need  - Initiate/Maintain bed alarm  - Obtain necessary fall risk management equipment: yellow socks  Problem: Prexisting or High Potential for Compromised Skin Integrity  Goal: Skin integrity is maintained or improved  Description: INTERVENTIONS:  - Identify patients at risk for skin breakdown  - Assess and monitor skin integrity  - Assess and monitor nutrition and hydration status  - Monitor labs   - Assess for incontinence   - Turn and reposition patient  - Assist with mobility/ambulation  - Relieve pressure over bony prominences  - Avoid friction and shearing  - Provide appropriate hygiene as needed including keeping skin clean and dry  - Evaluate need for skin moisturizer/barrier cream  - Collaborate with interdisciplinary team   - Patient/family teaching  - Consider wound care consult   Outcome: Progressing     Problem: DISCHARGE PLANNING  Goal: Discharge to home or other facility with appropriate resources  Description: INTERVENTIONS:  - Identify barriers to discharge w/patient and caregiver  - Arrange for needed discharge resources and transportation as appropriate  - Identify discharge learning needs (meds, wound care, etc.)  - Arrange for interpretive services to assist at discharge as needed  - Refer to Case Management Department for coordinating discharge planning if the patient needs post-hospital services based on physician/advanced practitioner order or complex needs related to functional status, cognitive ability, or social support system  Outcome: Progressing     - Apply yellow socks and bracelet for high fall risk patients  - Consider moving patient to room near nurses station  Outcome: Progressing

## 2024-10-28 NOTE — ASSESSMENT & PLAN NOTE
Present on admission, patient presenting from SNF with indwelling Bateman additionally with complaints of suprapubic tenderness/urinary urgency/burning with urination.  Patient otherwise afebrile and nontoxic-appearing  Outpatient urinalysis concerning for acute cystitis, with culture revealing sensitivity only to ertapenem/meropenem.  Sent here for IV antibiotic treatment  Spoke with the daughter who is a PCP with our network Dr. Baires.  Discussed with CM. Patient will need str, pending placement. Possible discharge tomorrow.   Urine culture sent (pic gretchen at bottom of note)   Given one dose of amikacin. Will monitor for now. No further AB needed  ID signed off  Medically clear for discharge.  PTOT

## 2024-10-28 NOTE — UTILIZATION REVIEW
"NOTIFICATION OF INPATIENT ADMISSION   AUTHORIZATION REQUEST   SERVICING FACILITY:   [facility]  Address: [address] [place] [address]  Tax ID: [id number]  NPI: [id number] ATTENDING PROVIDER:  Attending Name and NPI#: Jean Nix Md [[id number]]  Address: [address] [place] [address]  Phone: [phone]   ADMISSION INFORMATION:  Place of Service: Inpatient [facility]  Place of Service Code: 21  Inpatient Admission Date/Time: 10/25/24  8:34 PM  Discharge Date/Time: No discharge date for patient encounter.  Admitting Diagnosis Code/Description:  Cystitis [N30.90]  Abnormal laboratory test result [R89.9]     UTILIZATION REVIEW CONTACT:  Doretha Matthews"Tamia\"Willie Utilization   Network Utilization Review Department  Phone: [phone]  Fax: [phone]  Email: [email or web address]  Contact for approvals/pending authorizations, clinical reviews, and discharge.     PHYSICIAN ADVISORY SERVICES:  Medical Necessity Denial & Dtwd-jl-Wfsc Review  Phone: [phone]  Fax: [phone]  Email: [email or web address]     DISCHARGE SUPPORT TEAM:  For Patients Discharge Needs & Updates  Phone: [phone] opt. 2 Fax: [phone]  Email: [email or web address]     "

## 2024-10-28 NOTE — CASE MANAGEMENT
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     Generalized edema 2024     Constipation 2024     Leg pain, right 2024     Acute on chronic diastolic congestive heart failure ([facility]) 2024     s/p Medtronic loop recorder 3/2/2024 2024     Moderate protein-calorie malnutrition ([facility]) 2024     Hypertensive urgency 2024     AMS (altered mental status) 2024     Encounter for examination for admission to nursing home 2024     Stage 3a chronic kidney disease ([facility]) 01/10/2024     Left ventricular outflow obstruction 2024     Obesity, Class I, BMI 30-34.9 2024     Urinary retention 2023     SADAF (acute kidney injury) ([facility]) 2023     Exertional shortness of breath 2023     Non obstructive CAD 11/15/2023     History of lumbar surgery 11/10/2023     Abnormal urinalysis 2023     Chronic obstructive pulmonary disease, unspecified COPD type ([facility]) 2023     Confusion with body shakes and blank stare 2023     Anemia in stage 3a chronic kidney disease  ([facility]) 2023     Lower extremity edema 2023     Cerebrovascular accident (CVA), unspecified mechanism ([facility]) 2022     Stroke-like symptoms 2022     Prepyloric ulcer 2021     Diarrhea 2021     Mild intermittent asthma without complication 2020     S/P insertion of  spinal cord stimulator 07/18/2018     Iron deficiency anemia secondary to inadequate dietary iron intake 06/19/2018     Type 2 diabetes mellitus with other skin complications ([facility])      Failed back surgical syndrome 03/16/2018     Hypothyroidism 11/20/2017     Degenerative lumbar spinal stenosis 01/25/2017     Glaucoma 01/06/2017     Overactive bladder 08/04/2016     Dyspepsia 02/09/2016     Hypercholesterolemia 02/09/2016     Anxiety 02/09/2015     Chronic pain disorder 12/18/2013     Hypertension 06/12/2013       LOS (days): 3  Geometric Mean LOS (GMLOS) (days): 2.8  Days to GMLOS:0     OBJECTIVE:  Risk of Unplanned Readmission Score: 33.42         Current admission status: Inpatient   Preferred Pharmacy:   [facility] [address]  [address]  [address] [address]  Phone: [phone] Fax: [phone]    [facility] [place] [place] - [address]  [facility],  [address]  [facility],  First Floor [facility] [address]  Phone: [phone] Fax: [phone]    [facility] - [place], [place] - [address]  [address]  [place] [address]  Phone: [phone] Fax: [phone]    Primary Care Provider: NANY Pollock    Primary Insurance: [facility]  Secondary Insurance:     DISCHARGE DETAILS: TC to daughter, who states that she will call Dr Clark at [facility] regarding patient going back to [facility].      TC from [facility], who states that she reviewed the PT/OT notes this CM sent, and that the recommendation will remain STR.  This CM made daughter aware of that fact.  Daughter states this CM and she will follow up in AM regarding disposition of patient

## 2024-10-28 NOTE — CASE MANAGEMENT
Case Management Discharge Planning Note    Patient name Mattie Varela  Location /-01 MRN [id number]  : 1945 Date 10/28/2024       Current Admission Date: 10/25/2024  Current Admission Diagnosis:Klebsiella cystitis   Patient Active Problem List    Diagnosis Date Noted Date Diagnosed    Chronic heart failure with preserved ejection fraction (HFpEF) ([facility]) 10/25/2024     Klebsiella cystitis 10/25/2024     Acute kidney injury superimposed on chronic kidney disease  ([facility]) 10/18/2024     Acute low back pain 10/13/2024     Chest pain 10/11/2024     Acute cystitis 10/11/2024     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     Generalized edema 2024     Constipation 2024     Leg pain, right 2024     Acute on chronic diastolic congestive heart failure ([facility]) 2024     s/p Medtronic loop recorder 3/2/2024 2024     Moderate protein-calorie malnutrition ([facility]) 2024     Hypertensive urgency 2024     AMS (altered mental status) 2024     Encounter for examination for admission to nursing home 2024     Stage 3a chronic kidney disease ([facility]) 01/10/2024     Left ventricular outflow obstruction 2024     Obesity, Class I, BMI 30-34.9 2024     Urinary retention 2023     SADAF (acute kidney injury) ([facility]) 2023     Exertional shortness of breath 2023     Non obstructive CAD 11/15/2023     History of lumbar surgery 11/10/2023     Abnormal urinalysis 2023     Chronic obstructive pulmonary disease, unspecified COPD type ([facility]) 2023     Confusion with body shakes and blank stare 2023     Anemia in stage 3a chronic kidney disease  ([facility]) 2023     Lower extremity edema 2023     Cerebrovascular accident (CVA), unspecified mechanism ([facility]) 2022     Stroke-like symptoms 2022     Prepyloric ulcer 2021     Diarrhea 2021     Mild intermittent asthma without complication 2020     S/P insertion of  spinal cord stimulator 07/18/2018     Iron deficiency anemia secondary to inadequate dietary iron intake 06/19/2018     Type 2 diabetes mellitus with other skin complications ([facility])      Failed back surgical syndrome 03/16/2018     Hypothyroidism 11/20/2017     Degenerative lumbar spinal stenosis 01/25/2017     Glaucoma 01/06/2017     Overactive bladder 08/04/2016     Dyspepsia 02/09/2016     Hypercholesterolemia 02/09/2016     Anxiety 02/09/2015     Chronic pain disorder 12/18/2013     Hypertension 06/12/2013       LOS (days): 3  Geometric Mean LOS (GMLOS) (days): 2.8  Days to GMLOS:0     OBJECTIVE:  Risk of Unplanned Readmission Score: 33.42         Current admission status: Inpatient   Preferred Pharmacy:   [facility] [place] [address]  [address]  [address] [address]  Phone: [phone] Fax: [phone]    [facility] [place] [place] - [address]  [facility],  [address]  [facility],  First Floor [facility] [address]  Phone: [phone] Fax: [phone]    [facility] [place] [place] - [address]  [address]  [place] [address]  Phone: [phone] Fax: [phone]    Primary Care Provider: NANY Pollock    Primary Insurance: [facility]  Secondary Insurance:     DISCHARGE DETAILS:    Other Referral/Resources/Interventions Provided:  Referral Comments: TC to Emerson Sun at 1100 today, who stated that he would ask wellness to give this CM a TC back.  This CM was transferred to [facility], and did not get any representative to speak with.  This CM called x2 additional times, with no response.  This CM attempted to call again at 1424 to health and wellnessCamryn, who is requesting to see updated PT/OT, faxed to Camryn, at [phone], confirmation received. This CM made Camryn aware that daughter would like patient to return to [facility], rather then go to [facility] to review notes with her   and get back to this CM

## 2024-10-28 NOTE — ASSESSMENT & PLAN NOTE
Wt Readings from Last 3 Encounters:   10/18/24 74.3 kg (163 lb 12.8 oz)   10/02/24 85.5 kg (188 lb 9.6 oz)   09/26/24 86.2 kg (190 lb)     Continue torsemide 30 mg daily

## 2024-10-29 ENCOUNTER — TELEPHONE (OUTPATIENT)
Dept: OTHER | Facility: HOSPITAL | Age: 79
End: 2024-10-29

## 2024-10-29 LAB
GLUCOSE SERPL-MCNC: 130 MG/DL (ref 65–140)
GLUCOSE SERPL-MCNC: 189 MG/DL (ref 65–140)
GLUCOSE SERPL-MCNC: 194 MG/DL (ref 65–140)
GLUCOSE SERPL-MCNC: 200 MG/DL (ref 65–140)

## 2024-10-29 PROCEDURE — 97530 THERAPEUTIC ACTIVITIES: CPT

## 2024-10-29 PROCEDURE — 82948 REAGENT STRIP/BLOOD GLUCOSE: CPT

## 2024-10-29 PROCEDURE — 97535 SELF CARE MNGMENT TRAINING: CPT

## 2024-10-29 PROCEDURE — 99232 SBSQ HOSP IP/OBS MODERATE 35: CPT | Performed by: STUDENT IN AN ORGANIZED HEALTH CARE EDUCATION/TRAINING PROGRAM

## 2024-10-29 PROCEDURE — 97116 GAIT TRAINING THERAPY: CPT

## 2024-10-29 RX ORDER — TORSEMIDE 20 MG/1
30 TABLET ORAL ONCE
Status: DISCONTINUED | OUTPATIENT
Start: 2024-10-29 | End: 2024-10-29

## 2024-10-29 RX ORDER — TORSEMIDE 20 MG/1
30 TABLET ORAL ONCE
Status: COMPLETED | OUTPATIENT
Start: 2024-10-29 | End: 2024-10-29

## 2024-10-29 RX ORDER — DIPHENHYDRAMINE HYDROCHLORIDE, ZINC ACETATE 2; .1 G/100G; G/100G
CREAM TOPICAL 3 TIMES DAILY PRN
Status: DISCONTINUED | OUTPATIENT
Start: 2024-10-29 | End: 2024-10-30 | Stop reason: HOSPADM

## 2024-10-29 RX ADMIN — HEPARIN SODIUM 5000 UNITS: 5000 INJECTION INTRAVENOUS; SUBCUTANEOUS at 06:04

## 2024-10-29 RX ADMIN — HEPARIN SODIUM 5000 UNITS: 5000 INJECTION INTRAVENOUS; SUBCUTANEOUS at 21:17

## 2024-10-29 RX ADMIN — INSULIN LISPRO 1 UNITS: 100 INJECTION, SOLUTION INTRAVENOUS; SUBCUTANEOUS at 21:15

## 2024-10-29 RX ADMIN — INSULIN LISPRO 2 UNITS: 100 INJECTION, SOLUTION INTRAVENOUS; SUBCUTANEOUS at 06:04

## 2024-10-29 RX ADMIN — ACETAMINOPHEN 975 MG: 325 TABLET, FILM COATED ORAL at 06:04

## 2024-10-29 RX ADMIN — DOCUSATE SODIUM 100 MG: 100 CAPSULE, LIQUID FILLED ORAL at 17:34

## 2024-10-29 RX ADMIN — DOCUSATE SODIUM 100 MG: 100 CAPSULE, LIQUID FILLED ORAL at 08:53

## 2024-10-29 RX ADMIN — LEVOTHYROXINE SODIUM 37.5 MCG: 75 TABLET ORAL at 06:04

## 2024-10-29 RX ADMIN — SUCRALFATE 1 G: 1 TABLET ORAL at 06:04

## 2024-10-29 RX ADMIN — PANTOPRAZOLE SODIUM 40 MG: 40 TABLET, DELAYED RELEASE ORAL at 08:53

## 2024-10-29 RX ADMIN — SUCRALFATE 1 G: 1 TABLET ORAL at 17:34

## 2024-10-29 RX ADMIN — HEPARIN SODIUM 5000 UNITS: 5000 INJECTION INTRAVENOUS; SUBCUTANEOUS at 14:37

## 2024-10-29 RX ADMIN — OXYCODONE HYDROCHLORIDE 10 MG: 10 TABLET ORAL at 06:11

## 2024-10-29 RX ADMIN — ACETAMINOPHEN 975 MG: 325 TABLET, FILM COATED ORAL at 14:36

## 2024-10-29 RX ADMIN — LIDOCAINE 1 PATCH: 50 PATCH CUTANEOUS at 08:52

## 2024-10-29 RX ADMIN — TORSEMIDE 30 MG: 20 TABLET ORAL at 17:34

## 2024-10-29 RX ADMIN — SENNOSIDES 17.2 MG: 8.6 TABLET, FILM COATED ORAL at 21:17

## 2024-10-29 RX ADMIN — TEMAZEPAM 15 MG: 15 CAPSULE ORAL at 21:45

## 2024-10-29 RX ADMIN — METOPROLOL SUCCINATE 50 MG: 50 TABLET, EXTENDED RELEASE ORAL at 21:19

## 2024-10-29 RX ADMIN — TORSEMIDE 30 MG: 20 TABLET ORAL at 11:55

## 2024-10-29 RX ADMIN — METHOCARBAMOL 500 MG: 500 TABLET ORAL at 10:16

## 2024-10-29 RX ADMIN — OXYCODONE HYDROCHLORIDE 10 MG: 10 TABLET ORAL at 14:37

## 2024-10-29 RX ADMIN — ATORVASTATIN CALCIUM 40 MG: 40 TABLET, FILM COATED ORAL at 08:53

## 2024-10-29 RX ADMIN — ACETAMINOPHEN 975 MG: 325 TABLET, FILM COATED ORAL at 21:15

## 2024-10-29 RX ADMIN — METOPROLOL SUCCINATE 50 MG: 50 TABLET, EXTENDED RELEASE ORAL at 08:53

## 2024-10-29 RX ADMIN — ASPIRIN 81 MG CHEWABLE TABLET 81 MG: 81 TABLET CHEWABLE at 08:52

## 2024-10-29 NOTE — RESTORATIVE TECHNICIAN NOTE
Restorative Technician Note      Patient Name: Mattie Varela     Note Type: Mobility  Patient Position Upon Consult: Bedside chair  Activity Performed: Ambulated; Dangled; Stood  Assistive Device: Roller walker  Education Provided: Yes  Patient Position at End of Consult: Bedside chair; All needs within reach; Bed/Chair alarm activated    Evelyn MCKINLEY, Restorative Technician,

## 2024-10-29 NOTE — TELEPHONE ENCOUNTER
Patient Bateman catheter replaced during hospitalization for urinary retention.  Daughter is requesting follow-up care in the office be arranged for voiding trial as she does not want a void trial performed in rehab facility.

## 2024-10-29 NOTE — ASSESSMENT & PLAN NOTE
Present on admission, patient presenting from SNF with indwelling Bateman additionally with complaints of suprapubic tenderness/urinary urgency/burning with urination.  Patient otherwise afebrile and nontoxic-appearing  Outpatient urinalysis concerning for acute cystitis, with culture revealing sensitivity only to ertapenem/meropenem.  Sent here for IV antibiotic treatment  Spoke with the daughter who is a PCP with our network Dr. Baires.  Discussed with CM. Patient will need str, pending placement. Possible discharge tomorrow.   Treated with carbapenem/amikacin per ID, signed off  PTOT

## 2024-10-29 NOTE — OCCUPATIONAL THERAPY NOTE
Occupational Therapy Treatment Note      Mattie HOWE Nichole    10/29/2024    Principal Problem:    Klebsiella cystitis  Active Problems:    Degenerative lumbar spinal stenosis    Type 2 diabetes mellitus with other skin complications (HCC)    Chronic pain disorder    Hypertension    Hypothyroidism    Cerebrovascular accident (CVA), unspecified mechanism (HCC)    Chronic obstructive pulmonary disease, unspecified COPD type (HCC)    Stage 3a chronic kidney disease (HCC)    Chronic heart failure with preserved ejection fraction (HFpEF) ([facility])      Past Medical History:   Diagnosis Date    Acid reflux     Acute kidney injury (HCC)     Anemia     hx of iron-deficient    Anxiety     Arthritis     Asthma     last needed inhaler last year 2020    Basal cell carcinoma     upper lip    Chronic narcotic dependence (HCC)     Chronic pain     Colon polyp     Cystocele     Diabetes mellitus (HCC)     stable    Disease of thyroid gland     hypothyroidism    Diverticulosis     Dizziness     at times    Dysfunctional uterine bleeding     last assessed - [id number]    Dysphagia     Fibromyalgia     Gastric ulcer     Gastroparesis     History of colonic polyps     last assessed - [id number]    History of gastroesophageal reflux (GERD)     Hypercholesterolemia     Hyperlipidemia     Hypertension     Hyponatremia     IBS (irritable bowel syndrome)     Pneumobilia 06/18/2022    Post laminectomy syndrome     S/P insertion of spinal cord stimulator 07/18/2018    s/p Medtronic loop recorder 3/2/2024 03/02/2024    Seasonal allergies     Shortness of breath     exertional    Spinal stenosis     Status post lumbar spinal fusion 03/16/2018    Stroke ([facility])     pt states slight stroke March 2022       Past Surgical History:   Procedure Laterality Date    APPENDECTOMY      BACK SURGERY      BREAST CYST EXCISION Left     CARDIAC CATHETERIZATION  11/14/2023    Procedure: Cardiac catheterization;  Surgeon: Randolph Holt MD;  [facility]: [facility];  Service: Cardiology    CARDIAC CATHETERIZATION N/A 11/14/2023    Procedure: Cardiac Coronary Angiogram;  Surgeon: Randolph Holt MD;  Location: [facility];  Service: Cardiology    CARDIAC ELECTROPHYSIOLOGY PROCEDURE N/A 3/2/2024    Procedure: Cardiac loop recorder implant;  Surgeon: Edu Fontaine MD;  Location: [facility];  Service: Cardiology    CHOLECYSTECTOMY      COLONOSCOPY      ESOPHAGOGASTRODUODENOSCOPY N/A 09/28/2016    Procedure: ESOPHAGOGASTRODUODENOSCOPY (EGD);  Surgeon: Mylene Moeller MD;  Location: [facility] GI LAB;  Service:     HERNIA REPAIR      HYSTERECTOMY      TTAH-BSO age 28    LAMINECTOMY      LUMBAR LAMINECTOMY      OOPHORECTOMY      age 28    OH ARTHRODESIS POSTERIOR/PSTLAT TQ 1NTRSPC THORACIC N/A 06/04/2018    Procedure: Reopening of lumbar incision for T12-L5 posterior instrumented fixation and fusion and T12-L4 posterior decompression;  Surgeon: Wood Rogel MD;  Location: [facility] MAIN OR;  Service: Neurosurgery    OH COLONOSCOPY FLX DX W/COLLJ SPEC WHEN PFRMD N/A 03/02/2016    Procedure: EGD AND COLONOSCOPY;  Surgeon: Mylene Moeller MD;  Location: [facility] GI [facility];  Service: Gastroenterology    OH DILATION ESOPH UNGUIDED SOUND/BOUGIE 1/MULT PASS N/A 09/28/2016    Procedure: DILATATION ESOPHAGEAL;  Surgeon: Mylene Moeller MD;  Location: [facility];  Service: Gastroenterology    OH ESOPHAGOGASTRODUODENOSCOPY TRANSORAL DIAGNOSTIC N/A 07/18/2016    Procedure: ESOPHAGOGASTRODUODENOSCOPY (EGD);  Surgeon: Mylene Moeller MD;  Location: [facility];  Service: Gastroenterology    OH INSJ/RPLCMT SPINAL NPG/RCVR POCKET CRTJ&CONNJ Left 06/04/2018    Procedure: removal of left buttock implantable pulse generator and placement of new  implantable pulse generator;  Surgeon: Wood Rogel MD;  Location: [facility];  Service: Neurosurgery        10/29/24 1109   OT Last Visit   OT Visit Date 10/29/24   Note Type   Note Type Treatment   Pain Assessment   Pain Assessment Tool 0-10   Pain Score 7   Pain  Location/Orientation Orientation: Mid;Orientation: Lower;Location: Back   Pain Onset/Description Onset: Ongoing;Descriptor: Aching;Descriptor: Discomfort   Patient's Stated Pain Goal No pain   Hospital Pain Intervention(s) Repositioned;Ambulation/increased activity;Emotional support   Restrictions/Precautions   Weight Bearing Precautions Per Order No   Other Precautions Contact/isolation;Chair Alarm;Telemetry;Multiple lines;Fall Risk  (CARTY)   Lifestyle   Autonomy Pta pt reports being at rehab at her [facility], prior to rehab was walking to the dining hood using rollator.   Reciprocal Relationships supportive facility staff   Service to Others retired   Intrinsic Gratification enjoys watching TV   ADL   Grooming Assistance 5  Supervision/Setup   Grooming Deficit Setup;Teeth care;Wash/dry hands;Wash/dry face;Supervision/safety;Verbal cueing;Standing with assistive device   Grooming Comments pt completed oral care while standing @ sink w/ setup   Transfers   Sit to Stand 4  Minimal assistance   Additional items Assist x 1;Increased time required;Verbal cues   Stand to Sit 4  Minimal assistance   Additional items Assist x 1;Increased time required;Verbal cues   Additional Comments w/ RW   Functional Mobility   Functional Mobility 4  Minimal assistance   Additional Comments pt engaged in short household distance functional mobility w/ CGA; use of RW   Additional items Rolling walker   Cognition   Overall Cognitive Status WFL   Arousal/Participation Responsive;Cooperative   Attention Within functional limits   Orientation Level Oriented X4   Memory Within functional limits   Following Commands Follows all commands and directions without difficulty   Comments pt is pleasant and cooperative   Activity Tolerance   Activity Tolerance Patient tolerated treatment well   Medical Staff Made Aware PT, RN   Assessment   Assessment Patient participated in Skilled OT session 10/29/2024 with interventions consisting of ADL re training  with the use of correct body mechnaics, Energy Conservation techniques, safety awareness and fall prevention techniques, therapeutic exercise to: increase functional use of BUEs, increase BUE muscle strength ,  therapeutic activities to: increase activity tolerance, increase standing tolerance time with unilateral UE support to complete sink level ADLs, increase postural control, increase trunk control, and increase OOB/ sitting tolerance . Patient agreeable to OT treatment session, upon arrival patient was found seated OOB to Recliner.  In comparison to previous session, patient with improvements in functional mobility,standing tolerance and grooming . Patient requiring verbal cues for correct technique and verbal cues for pacing thru activity steps. Patient continues to be functioning below baseline level, occupational performance remains limited secondary to factors listed above and increased risk for falls and injury.   From OT standpoint, recommendation at time of d/c would be moderate resource intensity. Patient to benefit from continued Occupational Therapy treatment while in the hospital to address deficits as defined above and maximize level of functional independence with ADLs and functional mobility.   Plan   Treatment Interventions ADL retraining;Functional transfer training;UE strengthening/ROM;Endurance training;Cognitive reorientation;Patient/family training;Equipment evaluation/education;Compensatory technique education;Continued evaluation;Energy conservation;Activityengagement   Goal Expiration Date 11/09/24   OT Treatment Day 2   OT Frequency 2-3x/wk   Discharge Recommendation   Rehab Resource Intensity Level, OT II (Moderate Resource Intensity)   Additional Comments  The patient's raw score on the -PAC Daily Activity Inpatient Short Form is 20. A raw score of greater than or equal to 19 suggests the patient may benefit from discharge to home. Please refer to the recommendation of the  Occupational Therapist for safe discharge planning.   Additional Comments 2 Pt seen as a co-session due to the patient's co-morbidities, clinically unstable presentation, and present impairments which are a regression from the patient's baseline.   AM-PAC Daily Activity Inpatient   Lower Body Dressing 3   Bathing 3   Toileting 3   Upper Body Dressing 3   Grooming 4   Eating 4   Daily Activity Raw Score 20   Daily Activity Standardized Score (Calc for Raw Score >=11) 42.03   AM-PAC Applied Cognition Inpatient   Following a Speech/Presentation 4   Understanding Ordinary Conversation 4   Taking Medications 4   Remembering Where Things Are Placed or Put Away 4   Remembering List of 4-5 Errands 4   Taking Care of Complicated Tasks 3   Applied Cognition Raw Score 23   Applied Cognition Standardized Score 53.08   End of Consult   Education Provided Yes   Patient Position at End of Consult Bedside chair;All needs within reach;Bed/Chair alarm activated   Nurse Communication Nurse aware of consult     Nelda Barros MS, OTR/L

## 2024-10-29 NOTE — PROGRESS NOTES
Patient:  LEO ZAPATA    MRN:  [id number]    Aidin Request ID:  [id number]    Level of care reserved:  Skilled Nursing Facility    Partner Reserved:  [facility]  , [place] [address] [phone]    Clinical needs requested:    Geography searched:  10 miles around [id number]    Start of Service:    Request sent:  3:05pm EDT on 10/28/2024 by Shruti Cadena    Partner reserved:  3:46pm EDT on 10/28/2024 by Shruti Cadena    Choice list shared:  3:46pm EDT on 10/28/2024 by Shruti Cadena

## 2024-10-29 NOTE — ASSESSMENT & PLAN NOTE
Recently Bateman catheter was removed at rehab however noted to have retention again in the hospital for which Bateman catheter was placed back  Discussed with urology who will help coordinate outpatient follow-up as scheduled on 11/6 for outpatient voiding trial

## 2024-10-29 NOTE — ASSESSMENT & PLAN NOTE
Wt Readings from Last 3 Encounters:   10/18/24 74.3 kg (163 lb 12.8 oz)   10/02/24 85.5 kg (188 lb 9.6 oz)   09/26/24 86.2 kg (190 lb)     Continue torsemide 30 mg daily  Lower extremity edema with mild erythema, will give additional dose of oral torsemide 30 mg x 1 today  Monitor tomorrow

## 2024-10-29 NOTE — PLAN OF CARE
Problem: PAIN - ADULT  Goal: Verbalizes/displays adequate comfort level or baseline comfort level  Description: Interventions:  - Encourage patient to monitor pain and request assistance  - Assess pain using appropriate pain scale  - Administer analgesics based on type and severity of pain and evaluate response  - Implement non-pharmacological measures as appropriate and evaluate response  - Consider cultural and social influences on pain and pain management  - Notify physician/advanced practitioner if interventions unsuccessful or patient reports new pain  Outcome: Progressing     Problem: INFECTION - ADULT  Goal: Absence or prevention of progression during hospitalization  Description: INTERVENTIONS:  - Assess and monitor for signs and symptoms of infection  - Monitor lab/diagnostic results  - Monitor all insertion sites, i.e. indwelling lines, tubes, and drains  - Monitor endotracheal if appropriate and nasal secretions for changes in amount and color  - [place] appropriate cooling/warming therapies per order  - Administer medications as ordered  - Instruct and encourage patient and family to use good hand hygiene technique  - Identify and instruct in appropriate isolation precautions for identified infection/condition  Outcome: Progressing  Goal: Absence of fever/infection during neutropenic period  Description: INTERVENTIONS:  - Monitor WBC    Outcome: Progressing     Problem: SAFETY ADULT  Goal: Patient will remain free of falls  Description: INTERVENTIONS:  - Educate patient/family on patient safety including physical limitations  - Instruct patient to call for assistance with activity   - Consult OT/PT to assist with strengthening/mobility   - Keep Call bell within reach  - Keep bed low and locked with side rails adjusted as appropriate  - Keep care items and personal belongings within reach  - Initiate and maintain comfort rounds  - Make Fall Risk Sign visible to staff  - Offer Toileting every  Hours,  in advance of need  - Initiate/Maintain alarm  - Obtain necessary fall risk management equipment:   - Apply yellow socks and bracelet for high fall risk patients  - Consider moving patient to room near nurses station  Outcome: Progressing  Goal: Maintain or return to baseline ADL function  Description: INTERVENTIONS:  -  Assess patient's ability to carry out ADLs; assess patient's baseline for ADL function and identify physical deficits which impact ability to perform ADLs (bathing, care of mouth/teeth, toileting, grooming, dressing, etc.)  - Assess/evaluate cause of self-care deficits   - Assess range of motion  - Assess patient's mobility; develop plan if impaired  - Assess patient's need for assistive devices and provide as appropriate  - Encourage maximum independence but intervene and supervise when necessary  - Involve family in performance of ADLs  - Assess for home care needs following discharge   - Consider OT consult to assist with ADL evaluation and planning for discharge  - Provide patient education as appropriate  Outcome: Progressing  Goal: Maintains/Returns to pre admission functional level  Description: INTERVENTIONS:  - Perform AM-PAC 6 Click Basic Mobility/ Daily Activity assessment daily.  - Set and communicate daily mobility goal to care team and patient/family/caregiver.   - Collaborate with rehabilitation services on mobility goals if consulted  - Perform Range of Motion  times a day.  - Reposition patient every  hours.  - Dangle patient  times a day  - Stand patient  times a day  - Ambulate patient  times a day  - Out of bed to chair  times a day   - Out of bed for meals  times a day  - Out of bed for toileting  - Record patient progress and toleration of activity level   Outcome: Progressing     Problem: DISCHARGE PLANNING  Goal: Discharge to home or other facility with appropriate resources  Description: INTERVENTIONS:  - Identify barriers to discharge w/patient and caregiver  - Arrange for  needed discharge resources and transportation as appropriate  - Identify discharge learning needs (meds, wound care, etc.)  - Arrange for interpretive services to assist at discharge as needed  - Refer to Case Management Department for coordinating discharge planning if the patient needs post-hospital services based on physician/advanced practitioner order or complex needs related to functional status, cognitive ability, or social support system  Outcome: Progressing     Problem: Knowledge Deficit  Goal: Patient/family/caregiver demonstrates understanding of disease process, treatment plan, medications, and discharge instructions  Description: Complete learning assessment and assess knowledge base.  Interventions:  - Provide teaching at level of understanding  - Provide teaching via preferred learning methods  Outcome: Progressing     Problem: Prexisting or High Potential for Compromised Skin Integrity  Goal: Skin integrity is maintained or improved  Description: INTERVENTIONS:  - Identify patients at risk for skin breakdown  - Assess and monitor skin integrity  - Assess and monitor nutrition and hydration status  - Monitor labs   - Assess for incontinence   - Turn and reposition patient  - Assist with mobility/ambulation  - Relieve pressure over bony prominences  - Avoid friction and shearing  - Provide appropriate hygiene as needed including keeping skin clean and dry  - Evaluate need for skin moisturizer/barrier cream  - Collaborate with interdisciplinary team   - Patient/family teaching  - Consider wound care consult   Outcome: Progressing

## 2024-10-29 NOTE — CASE MANAGEMENT
Case Management Discharge Planning Note    Patient name Mattie Varela  Location /-01 MRN [id number]  : 1945 Date 10/29/2024       Current Admission Date: 10/25/2024  Current Admission Diagnosis:Klebsiella cystitis   Patient Active Problem List    Diagnosis Date Noted Date Diagnosed    Chronic heart failure with preserved ejection fraction (HFpEF) ([facility]) 10/25/2024     Klebsiella cystitis 10/25/2024     Acute kidney injury superimposed on chronic kidney disease  ([facility]) 10/18/2024     Acute low back pain 10/13/2024     Chest pain 10/11/2024     Acute cystitis 10/11/2024     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     Generalized edema 2024     Constipation 2024     Leg pain, right 2024     Acute on chronic diastolic congestive heart failure ([facility]) 2024     s/p Medtronic loop recorder 3/2/2024 2024     Moderate protein-calorie malnutrition ([facility]) 2024     Hypertensive urgency 2024     AMS (altered mental status) 2024     Encounter for examination for admission to nursing home 2024     Stage 3a chronic kidney disease ([facility]) 01/10/2024     Left ventricular outflow obstruction 2024     Obesity, Class I, BMI 30-34.9 2024     Urinary retention 2023     SADAF (acute kidney injury) ([facility]) 2023     Exertional shortness of breath 2023     Non obstructive CAD 11/15/2023     History of lumbar surgery 11/10/2023     Abnormal urinalysis 2023     Chronic obstructive pulmonary disease, unspecified COPD type ([facility]) 2023     Confusion with body shakes and blank stare 2023     Anemia in stage 3a chronic kidney disease  ([facility]) 2023     Lower extremity edema 2023     Cerebrovascular accident (CVA), unspecified mechanism ([facility]) 2022     Stroke-like symptoms 2022     Prepyloric ulcer 2021     Diarrhea 2021     Mild intermittent asthma without complication 2020     S/P insertion of  spinal cord stimulator 07/18/2018     Iron deficiency anemia secondary to inadequate dietary iron intake 06/19/2018     Type 2 diabetes mellitus with other skin complications (HCC)      Failed back surgical syndrome 03/16/2018     Hypothyroidism 11/20/2017     Degenerative lumbar spinal stenosis 01/25/2017     Glaucoma 01/06/2017     Overactive bladder 08/04/2016     Dyspepsia 02/09/2016     Hypercholesterolemia 02/09/2016     Anxiety 02/09/2015     Chronic pain disorder 12/18/2013     Hypertension 06/12/2013       LOS (days): 4  Geometric Mean LOS (GMLOS) (days): 2.8  Days to GMLOS:-0.9     OBJECTIVE:  Risk of Unplanned Readmission Score: 34.09         Current admission status: Inpatient   Preferred Pharmacy:   [facility] [address]  [address]  [address] [address]  Phone: [phone] Fax: [phone]    [facility] [place], [place] - [address]  [facility],  [address]  [facility],  First Floor [facility] [address]  Phone: [phone] Fax: [phone]    [facility] - [place], [place] - [address]  [address]  [place] [address]  Phone: [phone] Fax: [phone]    Primary Care Provider: NANY Pollock    Primary Insurance: [facility]  Secondary Insurance:     DISCHARGE DETAILS:    Other Referral/Resources/Interventions Provided:  Referral Comments: TC from Dr Clark at [facility], who states that she spoke with patient's daughter yesterday, but is of the opinion that patient should return to [facility] post DC for a short time.  TC to Daughter, informing her of same.  TC to Paul, who states that patient can transition over to [facility] when cleared, and to inform her of  time/ transportation information, etc [phone]    TC from Dr Baires, requesting that urology appointment be made for a voiding trial in the urology office prior to DC (in order to avoid facility pulling the  catheter).  Message sent to [facility] regarding same

## 2024-10-29 NOTE — PHYSICAL THERAPY NOTE
PHYSICAL THERAPY TREATMENT  NAME:  Mattie Varela  DATE: 10/29/24    AGE:   79 y.o.  Mrn:   [id number]  ADMIT DX:  Cystitis [N30.90]  Abnormal laboratory test result [R89.9]    Past Medical History:   Diagnosis Date    Acid reflux     Acute kidney injury (HCC)     Anemia     hx of iron-deficient    Anxiety     Arthritis     Asthma     last needed inhaler last year 2020    Basal cell carcinoma     upper lip    Chronic narcotic dependence (HCC)     Chronic pain     Colon polyp     Cystocele     Diabetes mellitus (HCC)     stable    Disease of thyroid gland     hypothyroidism    Diverticulosis     Dizziness     at times    Dysfunctional uterine bleeding     last assessed - [id number]    Dysphagia     Fibromyalgia     Gastric ulcer     Gastroparesis     History of colonic polyps     last assessed - [id number]    History of gastroesophageal reflux (GERD)     Hypercholesterolemia     Hyperlipidemia     Hypertension     Hyponatremia     IBS (irritable bowel syndrome)     Pneumobilia 06/18/2022    Post laminectomy syndrome     S/P insertion of spinal cord stimulator 07/18/2018    s/p [employer] loop recorder 3/2/2024 03/02/2024    Seasonal allergies     Shortness of breath     exertional    Spinal stenosis     Status post lumbar spinal fusion 03/16/2018    Stroke ([facility])     pt states slight stroke March 2022     Past Surgical History:   Procedure Laterality Date    APPENDECTOMY      BACK SURGERY      BREAST CYST EXCISION Left     CARDIAC CATHETERIZATION  11/14/2023    Procedure: Cardiac catheterization;  Surgeon: Randolph Holt MD;  [facility]: [facility];  Service: Cardiology    CARDIAC CATHETERIZATION N/A 11/14/2023    Procedure: Cardiac Coronary Angiogram;  Surgeon: Randolph Holt MD;  [facility];  Service: Cardiology    CARDIAC ELECTROPHYSIOLOGY PROCEDURE N/A 3/2/2024    Procedure: Cardiac loop recorder implant;  Surgeon: Edu Fontaine MD;  [facility]: [facility];  Service:  "Cardiology    CHOLECYSTECTOMY      COLONOSCOPY      ESOPHAGOGASTRODUODENOSCOPY N/A 09/28/2016    Procedure: ESOPHAGOGASTRODUODENOSCOPY (EGD);  Surgeon: Mylene Moeller MD;  Location: [facility] GI LAB;  Service:     HERNIA REPAIR      HYSTERECTOMY      TTAH-BSO age 28    LAMINECTOMY      LUMBAR LAMINECTOMY      OOPHORECTOMY      age 28    UT ARTHRODESIS POSTERIOR/PSTLAT TQ 1NTRSPC THORACIC N/A 06/04/2018    Procedure: Reopening of lumbar incision for T12-L5 posterior instrumented fixation and fusion and T12-L4 posterior decompression;  Surgeon: Wood Rogel MD;  Location: [facility] MAIN OR;  Service: Neurosurgery    UT COLONOSCOPY FLX DX W/COLLJ SPEC WHEN PFRMD N/A 03/02/2016    Procedure: EGD AND COLONOSCOPY;  Surgeon: Mylene Moeller MD;  Location: [facility] GI [facility];  Service: Gastroenterology    UT DILATION ESOPH UNGUIDED SOUND/BOUGIE 1/MULT PASS N/A 09/28/2016    Procedure: DILATATION ESOPHAGEAL;  Surgeon: Mylene Moeller MD;  Location: [facility];  Service: Gastroenterology    UT ESOPHAGOGASTRODUODENOSCOPY TRANSORAL DIAGNOSTIC N/A 07/18/2016    Procedure: ESOPHAGOGASTRODUODENOSCOPY (EGD);  Surgeon: Mylene Moeller MD;  Location: [facility];  Service: Gastroenterology    UT INSJ/RPLCMT SPINAL NPG/RCVR POCKET CRTJ&CONNJ Left 06/04/2018    Procedure: removal of left buttock implantable pulse generator and placement of new  implantable pulse generator;  Surgeon: Wood Rogel MD;  Location: [facility];  Service: Neurosurgery       Length Of Stay: 4     10/29/24 1055   PT Last Visit   PT Visit Date 10/29/24   Note Type   Note Type Treatment   Pain Assessment   Pain Assessment Tool 0-10   Pain Score 7   Pain Location/Orientation Orientation: Mid;Orientation: Lower;Location: Back   Pain Onset/Description Onset: Ongoing  (\"spasm\")   Effect of Pain on Daily Activities limits comfort   Patient's Stated Pain Goal No pain   Hospital Pain Intervention(s) Repositioned;Ambulation/increased activity  (pt was given muscle relaxer by RN prior to session.) " "  Multiple Pain Sites No   Restrictions/Precautions   Weight Bearing Precautions Per Order No   Other Precautions Pain;Fall Risk  (ahn)   General   Chart Reviewed Yes   Response to Previous Treatment Patient with no complaints from previous session.   Family/Caregiver Present No   Cognition   Overall Cognitive Status WFL   Arousal/Participation Alert;Cooperative   Attention Within functional limits   Orientation Level Oriented X4   Memory Within functional limits   Following Commands Follows one step commands without difficulty   Comments cooperative and motivated to work w/ PT- wants to get up and move more.   Subjective   Subjective \"Id like to walk more throughout the day.\"  (PT contacted PCA and restorative staff for checking in on pt for more frequent walks.)   Bed Mobility   Additional Comments pt was OOB in recliner on presentation   Transfers   Sit to Stand 4  Minimal assistance   Additional items Assist x 1;Increased time required;Verbal cues   Stand to Sit 5  Supervision   Additional items Assist x 1;Increased time required;Verbal cues   Additional Comments RW- pt was able to stand at sink w/ unilateral UE support for a total of approx 7 mins - FF posture noted.   Ambulation/Elevation   Gait pattern Excessively slow;Decreased foot clearance;Short stride   Gait Assistance 4  Minimal assist   Additional items Assist x 1;Verbal cues;Tactile cues   Assistive Device Rolling walker   Distance 15'+15' (to from bathroom) then 80+120'   Stair Management Assistance Not tested   Balance   Static Sitting Fair +   Dynamic Sitting Fair   Static Standing Fair -   Dynamic Standing Poor +   Ambulatory Poor +  (rw)   Endurance Deficit   Endurance Deficit Yes   Activity Tolerance   Activity Tolerance Patient limited by fatigue;Patient limited by pain   Medical Staff Made Aware care coordinaiton w/ OT; CM and RN   Nurse Made Aware yes- cleared for session   Exercises   Knee AROM Long Arc Quad Sitting;10 reps  (x2)   Ankle " "Pumps Sitting;25 reps  (x2)   Balance training  STS x5 w/ Mynor   Assessment   Prognosis Good   Problem List Decreased strength;Decreased endurance;Impaired balance;Decreased mobility;Obesity;Decreased skin integrity;Pain   Assessment Pt required increased assist for sit<>stand today to Mynor and increased  amount of time for completion of ambualtion today 2* reports of back \"spasms\" - pt did report walking helps \"loosen her back up\"  and PT encouraged pt to use C/L and ask staff to assist w/ walking when she wants to since that helps ease her pain. PT also contacted restorative staff to check in w/ pt daily. Pt continues to function below baseline and skilled inpt rehab Level 2 on d/c recommended prior to d/c back to [facility].   Goals   Patient Goals to have less back spasms   STG Expiration Date 11/05/24   PT Treatment Day 2   Plan   Treatment/Interventions ADL retraining;Functional transfer training;LE strengthening/ROM;Elevations;Therapeutic exercise;Endurance training;Patient/family training;Equipment eval/education;Bed mobility;Gait training;Compensatory technique education;Spoke to case management;OT   Progress Progressing toward goals   PT Frequency 3-5x/wk   Discharge Recommendation   Rehab Resource Intensity Level, PT II (Moderate Resource Intensity)   Equipment Recommended Walker   Walker Package Recommended Wheeled walker   AM-PAC Basic Mobility Inpatient   Turning in Flat Bed Without Bedrails 3   Lying on Back to Sitting on Edge of Flat Bed Without Bedrails 2   Moving Bed to Chair 3   Standing Up From Chair Using Arms 3   Walk in Room 3   Climb 3-5 Stairs With Railing 1   Basic Mobility Inpatient Raw Score 15   Basic Mobility Standardized Score 36.97   [facility] Highest Level Of Mobility   -[facility] Goal 4: Move to chair/commode   -[facility] Achieved 7: Walk 25 feet or more   End of Consult   Patient Position at End of Consult Bedside chair;Bed/Chair alarm activated;All needs within reach       The patient's " AM-PAC Basic Mobility Inpatient Short Form Raw Score is 15. A Raw score of less than or equal to 16 suggests the patient may benefit from discharge to post-acute rehabilitation services. Please also refer to the recommendation of the Physical Therapist for safe discharge planning.          Carol Romero, PT

## 2024-10-29 NOTE — PLAN OF CARE
Problem: OCCUPATIONAL THERAPY ADULT  Goal: Performs self-care activities at highest level of function for planned discharge setting.  See evaluation for individualized goals.  Description: Treatment Interventions: ADL retraining, Functional transfer training, Cognitive reorientation, Endurance training, Continued evaluation, Energy conservation          See flowsheet documentation for full assessment, interventions and recommendations.   Outcome: Progressing  Note: Limitation: Decreased ADL status, Decreased Safe judgement during ADL, Decreased endurance, Decreased self-care trans, Decreased high-level ADLs  Prognosis: Fair  Assessment: Patient participated in Skilled OT session 10/29/2024 with interventions consisting of ADL re training with the use of correct body mechnaics, Energy Conservation techniques, safety awareness and fall prevention techniques, therapeutic exercise to: increase functional use of BUEs, increase BUE muscle strength ,  therapeutic activities to: increase activity tolerance, increase standing tolerance time with unilateral UE support to complete sink level ADLs, increase postural control, increase trunk control, and increase OOB/ sitting tolerance . Patient agreeable to OT treatment session, upon arrival patient was found seated OOB to Recliner.  In comparison to previous session, patient with improvements in functional mobility,standing tolerance and grooming . Patient requiring verbal cues for correct technique and verbal cues for pacing thru activity steps. Patient continues to be functioning below baseline level, occupational performance remains limited secondary to factors listed above and increased risk for falls and injury.   From OT standpoint, recommendation at time of d/c would be moderate resource intensity. Patient to benefit from continued Occupational Therapy treatment while in the hospital to address deficits as defined above and maximize level of functional independence  with ADLs and functional mobility.     Rehab Resource Intensity Level, OT: II (Moderate Resource Intensity)     Nelda Barros MS, OTR/L

## 2024-10-29 NOTE — PROGRESS NOTES
Progress Note - Hospitalist   Name: Mattie Varela 79 y.o. female I MRN: [id number]  Unit/Bed#: -01 I Date of Admission: 10/25/2024   Date of Service: 10/29/2024 I Hospital Day: 4    Assessment & Plan  Klebsiella cystitis  Present on admission, patient presenting from SNF with indwelling Bateman additionally with complaints of suprapubic tenderness/urinary urgency/burning with urination.  Patient otherwise afebrile and nontoxic-appearing  Outpatient urinalysis concerning for acute cystitis, with culture revealing sensitivity only to ertapenem/meropenem.  Sent here for IV antibiotic treatment  Spoke with the daughter who is a PCP with our network Dr. Baires.  Discussed with CM. Patient will need str, pending placement. Possible discharge tomorrow.   Treated with carbapenem/amikacin per ID, signed off  PTOT  Chronic heart failure with preserved ejection fraction (HFpEF) ([facility])  Wt Readings from Last 3 Encounters:   10/18/24 74.3 kg (163 lb 12.8 oz)   10/02/24 85.5 kg (188 lb 9.6 oz)   09/26/24 86.2 kg (190 lb)     Continue torsemide 30 mg daily  Lower extremity edema with mild erythema, will give additional dose of oral torsemide 30 mg x 1 today  Monitor tomorrow  Degenerative lumbar spinal stenosis  S/p spinal stimulator although per family no longer working  Continue baclofen and Percocet as needed  Type 2 diabetes mellitus with other skin complications (HCC)    Lab Results   Component Value Date    HGBA1C 7.4 (H) 10/12/2024   Hold home metformin while admitted, start correctional insulin coverage with Accu-Cheks  Carb controlled diet  Initiate hypoglycemia protocol  Chronic pain disorder  PDMP and facility paperwork reviewed, patient on Percocet 10/325 mg as needed, continue home dosing  Hypertension  Blood pressure stable  Continue torsemide and Toprol  Hypothyroidism  Chronic and stable, continue home dose levothyroxine  Cerebrovascular accident (CVA), unspecified mechanism (HCC)  No new focal deficits  appreciated, continue ASA/statin  Chronic obstructive pulmonary disease, unspecified COPD type (HCC)  Not in acute exacerbation, continue as needed albuterol inhaler  Stage 3a chronic kidney disease (HCC)  Lab Results   Component Value Date    EGFR 42 10/28/2024    EGFR 43 10/27/2024    EGFR 41 10/26/2024    CREATININE 1.20 10/28/2024    CREATININE 1.18 10/27/2024    CREATININE 1.22 10/26/2024   Baseline creatinine appearing 1.1-1.3, renal function currently near baseline.  Monitor with BMP  Avoid/limit nephrotoxins and avoid hypotension, renally dose medications as needed  Urinary retention  Recently Bateman catheter was removed at rehab however noted to have retention again in the hospital for which Bateman catheter was placed back  Discussed with urology who will help coordinate outpatient follow-up as scheduled on 11/6 for outpatient voiding trial    VTE Pharmacologic Prophylaxis: VTE Score: 5 High Risk (Score >/= 5) - Pharmacological DVT Prophylaxis Ordered: heparin. Sequential Compression Devices Ordered.    Mobility:   Basic Mobility Inpatient Raw Score: 15  JH-HLM Goal: 4: Move to chair/commode  JH-HLM Achieved: 7: Walk 25 feet or more  JH-HLM Goal achieved. Continue to encourage appropriate mobility.    Patient Centered Rounds: I performed bedside rounds with nursing staff today.   Discussions with Specialists or Other Care Team Provider: Urology    Education and Discussions with Family / Patient: Updated  (daughter) via phone.    Current Length of Stay: 4 day(s)  Current Patient Status: Inpatient   Certification Statement: The patient will continue to require additional inpatient hospital stay due to lower extremity edema requiring monitoring overnight, awaiting placement to rehab  Discharge Plan: Anticipate discharge in 24-48 hrs to rehab facility.    Code Status: Level 1 - Full Code    Subjective   Patient assessed at bedside.  Reports redness and edema in the both lower extremities with mild  pain.    Objective :  Temp:  [97.4 °F (36.3 °C)-98.7 °F (37.1 °C)] 98.7 °F (37.1 °C)  HR:  [51-95] 51  BP: (103-140)/(60-73) 124/72  Resp:  [18] 18  SpO2:  [92 %-99 %] 92 %    There is no height or weight on file to calculate BMI.     Input and Output Summary (last 24 hours):     Intake/Output Summary (Last 24 hours) at 10/29/2024 1723  Last data filed at 10/28/2024 2301  Gross per 24 hour   Intake --   Output 850 ml   Net -850 ml       Physical Exam  Vitals reviewed.   Constitutional:       General: She is not in acute distress.     Appearance: Normal appearance. She is not ill-appearing.   HENT:      Head: Normocephalic and atraumatic.   Cardiovascular:      Rate and Rhythm: Normal rate and regular rhythm.      Heart sounds: Normal heart sounds.   Pulmonary:      Effort: Pulmonary effort is normal. No respiratory distress.   Abdominal:      General: Bowel sounds are normal.      Tenderness: There is no abdominal tenderness.   Musculoskeletal:      Right lower leg: Edema present.      Left lower leg: Edema present.      Comments: Mild erythema bilaterally, appears to be venous stasis related   Neurological:      Mental Status: She is alert. Mental status is at baseline.   Psychiatric:         Mood and Affect: Mood normal.         Behavior: Behavior normal.         Lines/Drains:  Lines/Drains/Airways       Active Status       Name Placement date Placement time Site Days    Urethral Catheter 16 Fr. 10/26/24  1311  --  3                  Urinary Catheter:  Goal for removal: N/A- Discharging with Bateman                 Lab Results: I have reviewed the following results:   Results from last 7 days   Lab Units 10/28/24  0515 10/26/24  0520 10/25/24  2000   WBC Thousand/uL 6.13   < > 8.37   HEMOGLOBIN g/dL 10.0*   < > 11.0*   HEMATOCRIT % 31.6*   < > 35.1   PLATELETS Thousands/uL 307   < > 327   SEGS PCT %  --   --  61   LYMPHO PCT %  --   --  21   MONO PCT %  --   --  8   EOS PCT %  --   --  8*    < > = values in this  interval not displayed.     Results from last 7 days   Lab Units 10/28/24  0515   SODIUM mmol/L 138   POTASSIUM mmol/L 3.9   CHLORIDE mmol/L 100   CO2 mmol/L 30   BUN mg/dL 23   CREATININE mg/dL 1.20   ANION GAP mmol/L 8   CALCIUM mg/dL 9.2   GLUCOSE RANDOM mg/dL 183*         Results from last 7 days   Lab Units 10/29/24  1615 10/29/24  1107 10/29/24  0603 10/28/24  2124 10/28/24  1626 10/28/24  1113 10/28/24  0753 10/27/24  2034 10/27/24  1552 10/27/24  1057 10/27/24  0615 10/26/24  2034   POC GLUCOSE mg/dl 200* 130 194* 184* 156* 224* 142* 237* 139 193* 150* 131               Recent Cultures (last 7 days):   Results from last 7 days   Lab Units 10/25/24  2042   URINE CULTURE  >100,000 cfu/ml Klebsiella pneumoniae ESBL*  10,000-19,000 cfu/ml       Imaging Results Review: No pertinent imaging studies reviewed.  Other Study Results Review: No additional pertinent studies reviewed.    Last 24 Hours Medication List:     Current Facility-Administered Medications:     acetaminophen (TYLENOL) tablet 975 mg, Q8H ARY    albuterol (PROVENTIL HFA,VENTOLIN HFA) inhaler 2 puff, Q6H PRN    aluminum-magnesium hydroxide-simethicone (MAALOX) oral suspension 30 mL, Q6H PRN    aspirin chewable tablet 81 mg, Daily    atorvastatin (LIPITOR) tablet 40 mg, Daily    docusate sodium (COLACE) capsule 100 mg, BID    heparin (porcine) subcutaneous injection 5,000 Units, Q8H ARY    HYDROmorphone (DILAUDID) injection 0.5 mg, Q3H PRN    insulin lispro (HumALOG/ADMELOG) 100 units/mL subcutaneous injection 1-5 Units, HS    insulin lispro (HumALOG/ADMELOG) 100 units/mL subcutaneous injection 1-6 Units, TID AC **AND** Fingerstick Glucose (POCT), TID AC    latanoprost (XALATAN) 0.005 % ophthalmic solution 1 drop, HS    levothyroxine tablet 37.5 mcg, Early Morning    lidocaine (LIDODERM) 5 % patch 1 patch, Daily    methocarbamol (ROBAXIN) tablet 500 mg, Q6H PRN    metoprolol succinate (TOPROL-XL) 24 hr tablet 50 mg, Q12H ARY    Milnacipran HCl TABS  100 mg, Daily    ondansetron (ZOFRAN) injection 4 mg, Q6H PRN    oxyCODONE (ROXICODONE) IR tablet 5 mg, Q6H PRN **OR** oxyCODONE (ROXICODONE) immediate release tablet 10 mg, Q6H PRN    pantoprazole (PROTONIX) EC tablet 40 mg, Daily    senna (SENOKOT) tablet 17.2 mg, HS    sodium chloride (OCEAN) 0.65 % nasal spray 1 spray, Q1H PRN    sucralfate (CARAFATE) tablet 1 g, BID AC    temazepam (RESTORIL) capsule 15 mg, HS PRN    torsemide (DEMADEX) tablet 30 mg, Daily    torsemide (DEMADEX) tablet 30 mg, Once    Administrative Statements   Today, Patient Was Seen By: May Alvarado, DO  I have spent a total time of 25 minutes in caring for this patient on the day of the visit/encounter including Counseling / Coordination of care, Documenting in the medical record, Reviewing / ordering tests, medicine, procedures  , Obtaining or reviewing history  , and Communicating with other healthcare professionals .    **Please Note: This note may have been constructed using a voice recognition system.**

## 2024-10-30 ENCOUNTER — APPOINTMENT (INPATIENT)
Dept: NON INVASIVE DIAGNOSTICS | Facility: HOSPITAL | Age: 79
DRG: 699 | End: 2024-10-30
Payer: MEDICARE

## 2024-10-30 VITALS
HEART RATE: 102 BPM | OXYGEN SATURATION: 98 % | SYSTOLIC BLOOD PRESSURE: 115 MMHG | RESPIRATION RATE: 18 BRPM | DIASTOLIC BLOOD PRESSURE: 72 MMHG | TEMPERATURE: 98.7 F

## 2024-10-30 PROBLEM — M79.604 PAIN IN BOTH LOWER EXTREMITIES: Status: ACTIVE | Noted: 2024-10-30

## 2024-10-30 PROBLEM — M79.605 PAIN IN BOTH LOWER EXTREMITIES: Status: ACTIVE | Noted: 2024-10-30

## 2024-10-30 LAB
ANION GAP SERPL CALCULATED.3IONS-SCNC: 12 MMOL/L (ref 4–13)
BASOPHILS # BLD AUTO: 0.04 THOUSANDS/ΜL (ref 0–0.1)
BASOPHILS NFR BLD AUTO: 1 % (ref 0–1)
BUN SERPL-MCNC: 23 MG/DL (ref 5–25)
CALCIUM SERPL-MCNC: 9.6 MG/DL (ref 8.4–10.2)
CHLORIDE SERPL-SCNC: 96 MMOL/L (ref 96–108)
CO2 SERPL-SCNC: 31 MMOL/L (ref 21–32)
CREAT SERPL-MCNC: 1.33 MG/DL (ref 0.6–1.3)
EOSINOPHIL # BLD AUTO: 0.41 THOUSAND/ΜL (ref 0–0.61)
EOSINOPHIL NFR BLD AUTO: 7 % (ref 0–6)
ERYTHROCYTE [DISTWIDTH] IN BLOOD BY AUTOMATED COUNT: 13.7 % (ref 11.6–15.1)
GFR SERPL CREATININE-BSD FRML MDRD: 37 ML/MIN/1.73SQ M
GLUCOSE SERPL-MCNC: 160 MG/DL (ref 65–140)
GLUCOSE SERPL-MCNC: 166 MG/DL (ref 65–140)
GLUCOSE SERPL-MCNC: 174 MG/DL (ref 65–140)
GLUCOSE SERPL-MCNC: 195 MG/DL (ref 65–140)
HCT VFR BLD AUTO: 34.9 % (ref 34.8–46.1)
HGB BLD-MCNC: 11 G/DL (ref 11.5–15.4)
IMM GRANULOCYTES # BLD AUTO: 0.02 THOUSAND/UL (ref 0–0.2)
IMM GRANULOCYTES NFR BLD AUTO: 0 % (ref 0–2)
LYMPHOCYTES # BLD AUTO: 2.32 THOUSANDS/ΜL (ref 0.6–4.47)
LYMPHOCYTES NFR BLD AUTO: 37 % (ref 14–44)
MCH RBC QN AUTO: 28.8 PG (ref 26.8–34.3)
MCHC RBC AUTO-ENTMCNC: 31.5 G/DL (ref 31.4–37.4)
MCV RBC AUTO: 91 FL (ref 82–98)
MONOCYTES # BLD AUTO: 0.64 THOUSAND/ΜL (ref 0.17–1.22)
MONOCYTES NFR BLD AUTO: 10 % (ref 4–12)
NEUTROPHILS # BLD AUTO: 2.89 THOUSANDS/ΜL (ref 1.85–7.62)
NEUTS SEG NFR BLD AUTO: 45 % (ref 43–75)
NRBC BLD AUTO-RTO: 0 /100 WBCS
PLATELET # BLD AUTO: 334 THOUSANDS/UL (ref 149–390)
PMV BLD AUTO: 10.5 FL (ref 8.9–12.7)
POTASSIUM SERPL-SCNC: 3.6 MMOL/L (ref 3.5–5.3)
RBC # BLD AUTO: 3.82 MILLION/UL (ref 3.81–5.12)
SODIUM SERPL-SCNC: 139 MMOL/L (ref 135–147)
WBC # BLD AUTO: 6.32 THOUSAND/UL (ref 4.31–10.16)

## 2024-10-30 PROCEDURE — 93970 EXTREMITY STUDY: CPT | Performed by: SURGERY

## 2024-10-30 PROCEDURE — 99239 HOSP IP/OBS DSCHRG MGMT >30: CPT | Performed by: STUDENT IN AN ORGANIZED HEALTH CARE EDUCATION/TRAINING PROGRAM

## 2024-10-30 PROCEDURE — 85025 COMPLETE CBC W/AUTO DIFF WBC: CPT | Performed by: STUDENT IN AN ORGANIZED HEALTH CARE EDUCATION/TRAINING PROGRAM

## 2024-10-30 PROCEDURE — 93970 EXTREMITY STUDY: CPT

## 2024-10-30 PROCEDURE — 80048 BASIC METABOLIC PNL TOTAL CA: CPT | Performed by: STUDENT IN AN ORGANIZED HEALTH CARE EDUCATION/TRAINING PROGRAM

## 2024-10-30 PROCEDURE — 99232 SBSQ HOSP IP/OBS MODERATE 35: CPT | Performed by: INTERNAL MEDICINE

## 2024-10-30 PROCEDURE — 82948 REAGENT STRIP/BLOOD GLUCOSE: CPT

## 2024-10-30 PROCEDURE — 97535 SELF CARE MNGMENT TRAINING: CPT

## 2024-10-30 RX ORDER — OXYCODONE HYDROCHLORIDE 5 MG/1
5 TABLET ORAL EVERY 6 HOURS PRN
Status: SHIPPED | OUTPATIENT
Start: 2024-10-30 | End: 2024-11-09

## 2024-10-30 RX ORDER — TORSEMIDE 10 MG/1
30 TABLET ORAL DAILY
Status: ON HOLD
Start: 2024-10-31

## 2024-10-30 RX ORDER — GABAPENTIN 100 MG/1
100 CAPSULE ORAL
Status: DISCONTINUED | OUTPATIENT
Start: 2024-10-30 | End: 2024-10-30 | Stop reason: HOSPADM

## 2024-10-30 RX ORDER — GABAPENTIN 100 MG/1
100 CAPSULE ORAL
Status: ON HOLD
Start: 2024-10-30

## 2024-10-30 RX ORDER — TEMAZEPAM 15 MG/1
15 CAPSULE ORAL
Qty: 4 CAPSULE | Refills: 0 | Status: ON HOLD | OUTPATIENT
Start: 2024-10-30

## 2024-10-30 RX ORDER — OXYCODONE HYDROCHLORIDE 10 MG/1
10 TABLET ORAL EVERY 6 HOURS PRN
Qty: 10 TABLET | Refills: 0 | Status: ON HOLD | OUTPATIENT
Start: 2024-10-30

## 2024-10-30 RX ADMIN — METHOCARBAMOL 500 MG: 500 TABLET ORAL at 13:07

## 2024-10-30 RX ADMIN — OXYCODONE HYDROCHLORIDE 10 MG: 10 TABLET ORAL at 04:23

## 2024-10-30 RX ADMIN — LIDOCAINE 1 PATCH: 50 PATCH CUTANEOUS at 07:18

## 2024-10-30 RX ADMIN — ASPIRIN 81 MG CHEWABLE TABLET 81 MG: 81 TABLET CHEWABLE at 07:18

## 2024-10-30 RX ADMIN — OXYCODONE HYDROCHLORIDE 10 MG: 10 TABLET ORAL at 15:36

## 2024-10-30 RX ADMIN — ACETAMINOPHEN 975 MG: 325 TABLET, FILM COATED ORAL at 05:02

## 2024-10-30 RX ADMIN — PANTOPRAZOLE SODIUM 40 MG: 40 TABLET, DELAYED RELEASE ORAL at 07:18

## 2024-10-30 RX ADMIN — HEPARIN SODIUM 5000 UNITS: 5000 INJECTION INTRAVENOUS; SUBCUTANEOUS at 05:02

## 2024-10-30 RX ADMIN — DOCUSATE SODIUM 100 MG: 100 CAPSULE, LIQUID FILLED ORAL at 07:18

## 2024-10-30 RX ADMIN — LEVOTHYROXINE SODIUM 37.5 MCG: 75 TABLET ORAL at 05:01

## 2024-10-30 RX ADMIN — ACETAMINOPHEN 975 MG: 325 TABLET, FILM COATED ORAL at 13:07

## 2024-10-30 RX ADMIN — ATORVASTATIN CALCIUM 40 MG: 40 TABLET, FILM COATED ORAL at 07:18

## 2024-10-30 RX ADMIN — DIPHENHYDRAMINE HYDROCHLORIDE, ZINC ACETATE: 2; .1 CREAM TOPICAL at 15:39

## 2024-10-30 RX ADMIN — SUCRALFATE 1 G: 1 TABLET ORAL at 15:36

## 2024-10-30 RX ADMIN — INSULIN LISPRO 2 UNITS: 100 INJECTION, SOLUTION INTRAVENOUS; SUBCUTANEOUS at 17:40

## 2024-10-30 RX ADMIN — SUCRALFATE 1 G: 1 TABLET ORAL at 07:18

## 2024-10-30 NOTE — PROGRESS NOTES
Progress Note - Infectious Disease   Name: Mattie Varela 79 y.o. female I MRN: [id number]  Unit/Bed#: -01 I Date of Admission: 10/25/2024   Date of Service: 10/30/2024 I Hospital Day: 5    Assessment & Plan  Pain in both lower extremities  Patient currently has bilateral and symmetric subjective pain in her anterior legs.  She has stable chronic venous stasis changes but no evidence of cellulitis clinically.  There is no open draining wound.  I suspect that her symmetric lower leg pain is secondary to neuropathy, superimposed on venous stasis.  No antibiotic needed.  Serial exams.  Pain control per primary service.  Klebsiella cystitis  Patient complete empiric course of antibiotic for MDR Kustera cystitis.  At present, there are no clinical signs of UTI.   No further antibiotic needed for this indication  Type 2 diabetes mellitus with other skin complications (HCC)  Lab Results   Component Value Date    HGBA1C 7.4 (H) 10/12/2024     Chronic pain disorder  Pain management per primary team  Stage 3a chronic kidney disease ([facility])  Lab Results   Component Value Date    EGFR 37 10/30/2024    EGFR 42 10/28/2024    EGFR 43 10/27/2024    CREATININE 1.33 (H) 10/30/2024    CREATININE 1.20 10/28/2024    CREATININE 1.18 10/27/2024   Creatinine elevated    Hospitalization records reviewed.  Discussed with patient in detail regarding the above plan.  I have discussed with Dr. Alvarado from primary service regarding the above plan to observe off antibiotic.  He agrees with the plan.    Antibiotics:  None    Subjective   I was asked by Dr. Alvarado from primary service to reevaluate patient for bilateral lower leg pain.  Patient states that she has intermittent discomfort in her bilateral feet and sometime in But now she has more intense pain in anterior lower legs bilaterally.  Temperature stays down.  No chills..    Objective :  Temp:  [97.5 °F (36.4 °C)-98.7 °F (37.1 °C)] 97.5 °F (36.4 °C)  HR:  [68-89] 84  BP:  (107-135)/(59-72) 107/71  Resp:  [16-18] 16  SpO2:  [92 %-96 %] 92 %    Physical Exam:     General: Awake, alert, cooperative, no distress.   Neck:  Supple. No mass.  No lymphadenopathy.   Lungs: Expansion symmetric, no rales, no wheezing, respirations unlabored.   Heart:  Regular rate and rhythm, S1 and S2 normal, no murmur.   Abdomen: Soft, nondistended, non-tender, bowel sounds active all four quadrants, no masses, no organomegaly.   Extremities: 1+ leg edema.  Moderate venous stasis changes.  No erythema/warmth.  Mild diffuse and symmetrical tenderness in anterior lower leg bilaterally..   Skin:  No rash.   Neuro: Moves all extremities.        Lab Results: I have reviewed the following results:  Results from last 7 days   Lab Units 10/30/24  0451 10/28/24  0515 10/27/24  0453   WBC Thousand/uL 6.32 6.13 5.21   HEMOGLOBIN g/dL 11.0* 10.0* 9.9*   PLATELETS Thousands/uL 334 307 295     Results from last 7 days   Lab Units 10/30/24  0451 10/28/24  0515 10/27/24  0453   SODIUM mmol/L 139 138 138   POTASSIUM mmol/L 3.6 3.9 3.7   CHLORIDE mmol/L 96 100 99   CO2 mmol/L 31 30 29   BUN mg/dL 23 23 29*   CREATININE mg/dL 1.33* 1.20 1.18   EGFR ml/min/1.73sq m 37 42 43   CALCIUM mg/dL 9.6 9.2 9.0     Results from last 7 days   Lab Units 10/25/24  2042   URINE CULTURE  >100,000 cfu/ml Klebsiella pneumoniae ESBL*  10,000-19,000 cfu/ml

## 2024-10-30 NOTE — OCCUPATIONAL THERAPY NOTE
Occupational Therapy Progress Note     Patient Name: Mattie Varela  Today's Date: 10/30/2024  Problem List  Principal Problem:    Klebsiella cystitis  Active Problems:    Degenerative lumbar spinal stenosis    Type 2 diabetes mellitus with other skin complications ([facility])    Chronic pain disorder    Hypertension    Hypothyroidism    Cerebrovascular accident (CVA), unspecified mechanism ([facility])    Chronic obstructive pulmonary disease, unspecified COPD type ([facility])    Urinary retention    Stage 3a chronic kidney disease ([facility])    Chronic heart failure with preserved ejection fraction (HFpEF) ([facility])       10/30/24 0914   Note Type   Note Type Treatment   Pain Assessment   Pain Assessment Tool 0-10   Pain Score 8   Pain Location/Orientation Location: Back   Patient's Stated Pain Goal No pain   Hospital Pain Intervention(s) Repositioned;Ambulation/increased activity   Restrictions/Precautions   Weight Bearing Precautions Per Order No   Other Precautions Contact/isolation;Chair Alarm;Bed Alarm;Pain;Fall Risk  (ahn)   Lifestyle   Autonomy Pta pt reports being at rehab at her [facility], prior to rehab was walking to the dining hood using rollator.   Reciprocal Relationships supportive facility staff   Service to Others retired   Intrinsic Gratification enjoys watching TV   ADL   Where Assessed Standing at sink   Grooming Assistance 5  Supervision/Setup   Grooming Deficit Setup;Supervision/safety;Increased time to complete;Wash/dry face;Brushing hair;Teeth care   Grooming Comments Pt brushes hair, brushes teeth, and washes face standing at sink w/ set up/S   UB Bathing Assistance 4  Minimal Assistance   UB Bathing Deficit Setup;Verbal cueing;Supervision/safety;Increased time to complete   UB Bathing Comments Pt washes UB standing at sink. Pt w/ significant dried blood around heparin shot site, therapist provides Min A to thoroughly clean area. RN made aware.   LB Bathing Assistance 3  Moderate Assistance   LB Bathing Deficit Right  "lower leg including foot;Left lower leg including foot   LB Bathing Comments Pt requires Min A to wash b/l feet. Pt declines to wash upper/lower legs at this time due to pain/weeping   UB Dressing Assistance 5  Supervision/Setup   UB Dressing Deficit Setup;Increased time to complete;Supervision/safety   UB Dressing Comments Pt doff/dons hospital gown standing at sink w/ set up/S   LB Dressing Assistance 3  Moderate Assistance   LB Dressing Deficit Setup;Verbal cueing;Supervision/safety;Increased time to complete;Don/doff R sock;Don/doff L sock   LB Dressing Comments Pt able to doff/don b/l socks w/ Mod A seated in chair   Functional Standing Tolerance   Time 15 minutes   Activity grooming, UB bathing/dressing   Comments Pt tolerates standing for 15 minutes at sink side w/ RW for support. Pt completes dynamic standing tasks w/ S   Bed Mobility   Additional Comments Pt greeted and left OOB in chair w/ alarm on and all needs within reach   Transfers   Sit to Stand 4  Minimal assistance  (CGA)   Additional items Assist x 1;Increased time required;Verbal cues   Stand to Sit 4  Minimal assistance  (CGA)   Additional items Assist x 1;Increased time required;Verbal cues   Additional Comments w/ RW   Functional Mobility   Functional Mobility 4  Minimal assistance  (CGA)   Additional Comments Pt completed short household distance mobility within room w/ CGA using RW   Additional items Rolling walker   Subjective   Subjective \"Its hard w/ my back spasms\" in reference to reaching her feet   Cognition   Overall Cognitive Status WFL   Arousal/Participation Alert;Responsive;Cooperative   Attention Within functional limits   Orientation Level Oriented X4   Memory Within functional limits   Following Commands Follows all commands and directions without difficulty   Comments Pt is pleasant and cooperative   Activity Tolerance   Activity Tolerance Patient tolerated treatment well;Patient limited by pain   Medical Staff Made Aware RN " cleared and updated   Assessment   Assessment Pt seen for skilled OT treatment session on this date w/ interventions focusing on ADL participation, activity tolerance, standing tolerance, standing balance, transfer skills, and fxnl mobility. Pt was agreeable and willing to participate in session. Pt engaged in the following tasks: Supervision grooming, UB dressing; CGA STS, FM; Mod A LB bathing/dressing. In comparison to previous session, pt demonstrated improvements in standing tolerance at the sink for functional tasks. Pt required ocassional safety reminders. Pt continues to be functioning below baseline level as occupational performance remains limited by decreased ADL status, decreased activity tolerance, decreased standing balance, decreased transfer skills, decreased fxnl mobility, and pain. The patient's raw score on the -PAC Daily Activity Inpatient Short Form is 17. A raw score of less than 19 suggests the patient may benefit from discharge to post-acute rehabilitation services. Please refer to the recommendation of the Occupational Therapist for safe discharge planning.  From OT standpoint, recommend Level II (Moderate Resource Intensity) at time of d/c. Pt will benefit from continued OT treatment while in acute care to address deficits as defined above and maximize level of functional independence with ADLs and functional mobility. Pt left OOB in chair w/ alarm on and all needs within reach at end of session.   Plan   Treatment Interventions ADL retraining;Functional transfer training;Endurance training;Patient/family training;Equipment evaluation/education;Compensatory technique education;Continued evaluation;Energy conservation;Activityengagement;UE strengthening/ROM;Cognitive reorientation   Goal Expiration Date 11/09/24   OT Treatment Day 3   OT Frequency 2-3x/wk   Discharge Recommendation   Rehab Resource Intensity Level, OT II (Moderate Resource Intensity)   AM-PAC Daily Activity Inpatient    Lower Body Dressing 2   Bathing 2   Toileting 3   Upper Body Dressing 3   Grooming 3   Eating 4   Daily Activity Raw Score 17   Daily Activity Standardized Score (Calc for Raw Score >=11) 37.26   AM-PAC Applied Cognition Inpatient   Following a Speech/Presentation 4   Understanding Ordinary Conversation 4   Taking Medications 4   Remembering Where Things Are Placed or Put Away 4   Remembering List of 4-5 Errands 4   Taking Care of Complicated Tasks 3   Applied Cognition Raw Score 23   Applied Cognition Standardized Score 53.08   End of Consult   Education Provided Yes   Patient Position at End of Consult Bedside chair;Bed/Chair alarm activated;All needs within reach   Nurse Communication Nurse aware of consult     SHAINA Mccormick, OTR/L

## 2024-10-30 NOTE — PROGRESS NOTES
Progress Note - Hospitalist   Name: Mattie Varela 79 y.o. female I MRN: [id number]  Unit/Bed#: -01 I Date of Admission: 10/25/2024   Date of Service: 10/30/2024 I Hospital Day: 5    Assessment & Plan  Klebsiella cystitis  Present on admission, patient presenting from SNF with indwelling Bateman secondary to urinary retention additionally with complaints of suprapubic tenderness/urinary urgency/burning with urination.  Patient otherwise afebrile and nontoxic-appearing  Outpatient urinalysis concerning for acute cystitis, with culture revealing sensitivity only to ertapenem/meropenem.  Sent here for IV antibiotic treatment  Spoke with the daughter who is a PCP with our network Dr. Baires.  Discussed with CM. Patient will need str, pending placement.   Treated with IV carbapenem/amikacin per ID  PTOT  Chronic heart failure with preserved ejection fraction (HFpEF) ([facility])  Wt Readings from Last 3 Encounters:   10/18/24 74.3 kg (163 lb 12.8 oz)   10/02/24 85.5 kg (188 lb 9.6 oz)   09/26/24 86.2 kg (190 lb)     Continue torsemide 30 mg daily  Monitor BMP tomorrow  Degenerative lumbar spinal stenosis  S/p spinal stimulator although per family no longer working  Continue baclofen and Percocet as needed  Type 2 diabetes mellitus with other skin complications (H[facility])    Lab Results   Component Value Date    HGBA1C 7.4 (H) 10/12/2024   Hold home metformin while admitted, start correctional insulin coverage with Accu-Cheks  Carb controlled diet  Initiate hypoglycemia protocol  Chronic pain disorder  PDMP and facility paperwork reviewed, patient on Percocet 10/325 mg as needed, continue home dosing  Hypertension  Blood pressure stable  Continue torsemide and Toprol  Hypothyroidism  Chronic and stable, continue home dose levothyroxine  Cerebrovascular accident (CVA), unspecified mechanism ([facility])  No new focal deficits appreciated, continue ASA/statin  Chronic obstructive pulmonary disease, unspecified COPD type (H[facility])  Not in  acute exacerbation, continue as needed albuterol inhaler  Stage 3a chronic kidney disease ([facility])  Lab Results   Component Value Date    EGFR 37 10/30/2024    EGFR 42 10/28/2024    EGFR 43 10/27/2024    CREATININE 1.33 (H) 10/30/2024    CREATININE 1.20 10/28/2024    CREATININE 1.18 10/27/2024   Baseline creatinine appearing 1.1-1.3, renal function currently near baseline.  Monitor with BMP  Avoid/limit nephrotoxins and avoid hypotension, renally dose medications as needed  Urinary retention  Recently Bateman catheter was removed at rehab however noted to have retention again in the hospital for which Bateman catheter was placed back  Discussed with urology who will help coordinate outpatient follow-up as scheduled on 11/6 for outpatient voiding trial  Pain in both lower extremities  Bilateral lower extremity pain likely neuropathic etiology  Discussed with ID with low suspicion for cellulitis, monitoring off antibiotics  Start gabapentin 100 mg at bedtime and uptitrate as tolerated.  Patient previously was on gabapentin however this was discontinued as it made her feel weird.  Will slowly uptitrate as tolerated.  Bilateral lower extremity duplex negative for DVT    VTE Pharmacologic Prophylaxis: VTE Score: 5 High Risk (Score >/= 5) - Pharmacological DVT Prophylaxis Ordered: heparin. Sequential Compression Devices Ordered.    Mobility:   Basic Mobility Inpatient Raw Score: 15  JH-HLM Goal: 4: Move to chair/commode  JH-HLM Achieved: 4: Move to chair/commode  JH-HLM Goal achieved. Continue to encourage appropriate mobility.    Patient Centered Rounds: I performed bedside rounds with nursing staff today.   Discussions with Specialists or Other Care Team Provider: ID    Education and Discussions with Family / Patient: Updated  (daughter) via phone.    Current Length of Stay: 5 day(s)  Current Patient Status: Inpatient   Certification Statement: The patient will continue to require additional inpatient hospital  stay due to awaiting finalization of ultrasound results and pending placement for rehab for safe discharge  Discharge Plan: Anticipate discharge tomorrow to rehab facility.    Code Status: Level 1 - Full Code    Subjective   Patient assessed at bedside.  Reports feeling better today.  Continues to have pain in the lower extremities.  Good urine output overnight of -2 L.  Lower extremity swelling improved.    Objective :  Temp:  [97.5 °F (36.4 °C)-98.7 °F (37.1 °C)] 98.7 °F (37.1 °C)  HR:  [] 109  BP: (107-135)/(59-72) 115/72  Resp:  [16-18] 18  SpO2:  [92 %-98 %] 98 %  O2 Device: None (Room air)    There is no height or weight on file to calculate BMI.     Input and Output Summary (last 24 hours):     Intake/Output Summary (Last 24 hours) at 10/30/2024 1628  Last data filed at 10/30/2024 1313  Gross per 24 hour   Intake 180 ml   Output 3700 ml   Net -3520 ml       Physical Exam  Vitals reviewed.   Constitutional:       General: She is not in acute distress.     Appearance: Normal appearance. She is not ill-appearing.   HENT:      Head: Normocephalic and atraumatic.   Cardiovascular:      Rate and Rhythm: Normal rate and regular rhythm.      Heart sounds: Normal heart sounds.   Pulmonary:      Effort: Pulmonary effort is normal. No respiratory distress.   Abdominal:      General: Bowel sounds are normal.      Palpations: Abdomen is soft.      Tenderness: There is no abdominal tenderness.   Musculoskeletal:      Right lower leg: Edema (Trace) present.      Left lower leg: Edema (Trace) present.   Skin:     Comments: Stasis changes in bilateral lower extremity   Neurological:      Mental Status: She is alert and oriented to person, place, and time. Mental status is at baseline.         Lines/Drains:  Lines/Drains/Airways       Active Status       Name Placement date Placement time Site Days    Urethral Catheter 16 Fr. 10/26/24  1311  --  4                  Urinary Catheter:  Goal for removal: N/A- Discharging  with Bateman                 Lab Results: I have reviewed the following results:   Results from last 7 days   Lab Units 10/30/24  0451   WBC Thousand/uL 6.32   HEMOGLOBIN g/dL 11.0*   HEMATOCRIT % 34.9   PLATELETS Thousands/uL 334   SEGS PCT % 45   LYMPHO PCT % 37   MONO PCT % 10   EOS PCT % 7*     Results from last 7 days   Lab Units 10/30/24  0451   SODIUM mmol/L 139   POTASSIUM mmol/L 3.6   CHLORIDE mmol/L 96   CO2 mmol/L 31   BUN mg/dL 23   CREATININE mg/dL 1.33*   ANION GAP mmol/L 12   CALCIUM mg/dL 9.6   GLUCOSE RANDOM mg/dL 160*         Results from last 7 days   Lab Units 10/30/24  1113 10/30/24  0627 10/29/24  2030 10/29/24  1615 10/29/24  1107 10/29/24  0603 10/28/24  2124 10/28/24  1626 10/28/24  1113 10/28/24  0753 10/27/24  2034 10/27/24  1552   POC GLUCOSE mg/dl 174* 166* 189* 200* 130 194* 184* 156* 224* 142* 237* 139               Recent Cultures (last 7 days):   Results from last 7 days   Lab Units 10/25/24  2042   URINE CULTURE  >100,000 cfu/ml Klebsiella pneumoniae ESBL*  10,000-19,000 cfu/ml       Imaging Results Review: I reviewed radiology reports from this admission including: Ultrasound(s).  Other Study Results Review: No additional pertinent studies reviewed.    Last 24 Hours Medication List:     Current Facility-Administered Medications:     acetaminophen (TYLENOL) tablet 975 mg, Q8H ARY    albuterol (PROVENTIL HFA,VENTOLIN HFA) inhaler 2 puff, Q6H PRN    aluminum-magnesium hydroxide-simethicone (MAALOX) oral suspension 30 mL, Q6H PRN    aspirin chewable tablet 81 mg, Daily    atorvastatin (LIPITOR) tablet 40 mg, Daily    diphenhydrAMINE-zinc acetate (BENADRYL) 2-0.1 % cream, TID PRN    docusate sodium (COLACE) capsule 100 mg, BID    gabapentin (NEURONTIN) capsule 100 mg, HS    heparin (porcine) subcutaneous injection 5,000 Units, Q8H ARY    HYDROmorphone (DILAUDID) injection 0.5 mg, Q3H PRN    insulin lispro (HumALOG/ADMELOG) 100 units/mL subcutaneous injection 1-5 Units, HS    insulin  lispro (HumALOG/ADMELOG) 100 units/mL subcutaneous injection 1-6 Units, TID AC **AND** Fingerstick Glucose (POCT), TID AC    latanoprost (XALATAN) 0.005 % ophthalmic solution 1 drop, HS    levothyroxine tablet 37.5 mcg, Early Morning    lidocaine (LIDODERM) 5 % patch 1 patch, Daily    methocarbamol (ROBAXIN) tablet 500 mg, Q6H PRN    metoprolol succinate (TOPROL-XL) 24 hr tablet 50 mg, Q12H ARY    Milnacipran HCl TABS 100 mg, Daily    ondansetron (ZOFRAN) injection 4 mg, Q6H PRN    oxyCODONE (ROXICODONE) IR tablet 5 mg, Q6H PRN **OR** oxyCODONE (ROXICODONE) immediate release tablet 10 mg, Q6H PRN    pantoprazole (PROTONIX) EC tablet 40 mg, Daily    senna (SENOKOT) tablet 17.2 mg, HS    sodium chloride (OCEAN) 0.65 % nasal spray 1 spray, Q1H PRN    sucralfate (CARAFATE) tablet 1 g, BID AC    temazepam (RESTORIL) capsule 15 mg, HS PRN    torsemide (DEMADEX) tablet 30 mg, Daily    Administrative Statements   Today, Patient Was Seen By: May Alvarado, DO  I have spent a total time of 25 minutes in caring for this patient on the day of the visit/encounter including Counseling / Coordination of care, Documenting in the medical record, Reviewing / ordering tests, medicine, procedures  , Obtaining or reviewing history  , and Communicating with other healthcare professionals .    **Please Note: This note may have been constructed using a voice recognition system.**

## 2024-10-30 NOTE — ASSESSMENT & PLAN NOTE
Bilateral lower extremity pain likely neuropathic etiology  Discussed with ID with low suspicion for cellulitis, monitoring off antibiotics  Start gabapentin 100 mg at bedtime and uptitrate as tolerated.  Patient previously was on gabapentin however this was discontinued as it made her feel weird.  Will slowly uptitrate as tolerated.  Bilateral lower extremity duplex finalized negative for DVT

## 2024-10-30 NOTE — ASSESSMENT & PLAN NOTE
Wt Readings from Last 3 Encounters:   10/18/24 74.3 kg (163 lb 12.8 oz)   10/02/24 85.5 kg (188 lb 9.6 oz)   09/26/24 86.2 kg (190 lb)     Continue torsemide 30 mg daily  Monitor BMP tomorrow

## 2024-10-30 NOTE — ASSESSMENT & PLAN NOTE
Lab Results   Component Value Date    EGFR 37 10/30/2024    EGFR 42 10/28/2024    EGFR 43 10/27/2024    CREATININE 1.33 (H) 10/30/2024    CREATININE 1.20 10/28/2024    CREATININE 1.18 10/27/2024   Baseline creatinine appearing 1.1-1.3, renal function currently near baseline.  Monitor with BMP  Avoid/limit nephrotoxins and avoid hypotension, renally dose medications as needed

## 2024-10-30 NOTE — ASSESSMENT & PLAN NOTE
Wt Readings from Last 3 Encounters:   10/18/24 74.3 kg (163 lb 12.8 oz)   10/02/24 85.5 kg (188 lb 9.6 oz)   09/26/24 86.2 kg (190 lb)     Continue torsemide 30 mg daily  Euvolemic   Outpatient follow-up

## 2024-10-30 NOTE — ASSESSMENT & PLAN NOTE
Lab Results   Component Value Date    EGFR 37 10/30/2024    EGFR 42 10/28/2024    EGFR 43 10/27/2024    CREATININE 1.33 (H) 10/30/2024    CREATININE 1.20 10/28/2024    CREATININE 1.18 10/27/2024   Creatinine elevated

## 2024-10-30 NOTE — ASSESSMENT & PLAN NOTE
Patient complete empiric course of antibiotic for MDR Kustera cystitis.  At present, there are no clinical signs of UTI.   No further antibiotic needed for this indication

## 2024-10-30 NOTE — CASE MANAGEMENT
Case Management Discharge Planning Note    Patient name Mattie Varela  Location /-01 MRN [id number]  : 1945 Date 10/30/2024       Current Admission Date: 10/25/2024  Current Admission Diagnosis:Klebsiella cystitis   Patient Active Problem List    Diagnosis Date Noted Date Diagnosed    Pain in both lower extremities 10/30/2024     Chronic heart failure with preserved ejection fraction (HFpEF) ([facility]) 10/25/2024     Klebsiella cystitis 10/25/2024     Acute kidney injury superimposed on chronic kidney disease  ([facility]) 10/18/2024     Acute low back pain 10/13/2024     Chest pain 10/11/2024     Acute cystitis 10/11/2024     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     Generalized edema 2024     Constipation 2024     Leg pain, right 2024     Acute on chronic diastolic congestive heart failure ([facility]) 2024     s/p [employer] loop recorder 3/2/2024 2024     Moderate protein-calorie malnutrition ([facility]) 2024     Hypertensive urgency 2024     AMS (altered mental status) 2024     Encounter for examination for admission to nursing home 2024     Stage 3a chronic kidney disease ([facility]) 01/10/2024     Left ventricular outflow obstruction 2024     Obesity, Class I, BMI 30-34.9 2024     Urinary retention 2023     SADAF (acute kidney injury) ([facility]) 2023     Exertional shortness of breath 2023     Non obstructive CAD 11/15/2023     History of lumbar surgery 11/10/2023     Abnormal urinalysis 2023     Chronic obstructive pulmonary disease, unspecified COPD type ([facility]) 2023     Confusion with body shakes and blank stare 2023     Anemia in stage 3a chronic kidney disease  ([facility]) 2023     Lower extremity edema 2023     Cerebrovascular accident (CVA), unspecified mechanism ([facility]) 2022     Stroke-like symptoms 2022     Prepyloric ulcer 2021     Diarrhea 2021     Mild intermittent asthma without  complication 03/11/2020     S/P insertion of spinal cord stimulator 07/18/2018     Iron deficiency anemia secondary to inadequate dietary iron intake 06/19/2018     Type 2 diabetes mellitus with other skin complications ([facility])      Failed back surgical syndrome 03/16/2018     Hypothyroidism 11/20/2017     Degenerative lumbar spinal stenosis 01/25/2017     Glaucoma 01/06/2017     Overactive bladder 08/04/2016     Dyspepsia 02/09/2016     Hypercholesterolemia 02/09/2016     Anxiety 02/09/2015     Chronic pain disorder 12/18/2013     Hypertension 06/12/2013       LOS (days): 5  Geometric Mean LOS (GMLOS) (days): 2.8  Days to GMLOS:-2     OBJECTIVE:  Risk of Unplanned Readmission Score: 36.91         Current admission status: Inpatient   Preferred Pharmacy:   [employer] [facility] [employer] [facility] [email or web address]  [email or web address]  [address] [place] [address]  Phone: [phone] Fax: [phone]    [facility] [place], [place] - [address]  [facility],  [address]  [facility],  First Floor [facility] [address]  Phone: [phone] Fax: [phone]    [facility] - [place], [place] - [address]  [address]  [place] [address]  Phone: [phone] Fax: [phone]    Primary Care Provider: NANY Pollock    Primary Insurance: [facility]  Secondary Insurance:     DISCHARGE DETAILS:    Discharge planning discussed with:: Patient and daughter, Caty Maldonado via phone  Freedom of Choice: Yes  Comments - Freedom of Choice: [facility]ITI to [place]   contacted family/caregiver?: Yes   Contacts  Patient Contacts: daughter, Dr. Venice Baires  Relationship to Patient:: Family  Reason/Outcome: Discharge Planning       Other Referral/Resources/Interventions Provided:  Interventions: Short Term Rehab         Treatment Team Recommendation: Short Term Rehab  Discharge Destination Plan:: Short Term Rehab  Transport at Discharge : Wheelchair van     Number/Name of Dispatcher:  Roundtrip  Transported by (Company and Unit #): Kalina claimed the ride for Mattie PETERSON. and will arrive on 10/30/2024 at 4:45pm EDT. Contact them at [phone]  ETA of Transport (Date): 10/30/24  ETA of Transport (Time): 6958      IMM Given (Date):: 10/30/24  IMM Given to:: Patient ..IMM reviewed with patient, patient agrees with discharge determination.      Accepting Facility Name, City & State : [facility]  [address] [place] [address]  Receiving Facility/Agency Phone Number: [phone]  Facility/Agency Fax Number: [phone] or [phone]

## 2024-10-30 NOTE — ASSESSMENT & PLAN NOTE
Bilateral lower extremity pain likely neuropathic etiology  Discussed with ID with low suspicion for cellulitis, monitoring off antibiotics  Start gabapentin 100 mg at bedtime and uptitrate as tolerated.  Patient previously was on gabapentin however this was discontinued as it made her feel weird.  Will slowly uptitrate as tolerated.  Bilateral lower extremity duplex negative for DVT

## 2024-10-30 NOTE — DISCHARGE SUMMARY
Discharge Summary - Hospitalist   Name: Mattie Varela 79 y.o. female I MRN: [id number]  Unit/Bed#: -01 I Date of Admission: 10/25/2024   Date of Service: 10/30/2024 I Hospital Day: 5     Assessment & Plan  Klebsiella cystitis  Present on admission, patient presenting from SNF without catheter however patient previously had ahn catheter removed 2 days prior to admission. On admission, patient was reporting complaints of suprapubic tenderness/urinary urgency/burning with urination.  Patient otherwise afebrile and nontoxic-appearing. Concern for catheter associated klebsiella cystitis secondary to urinary retention.   Outpatient urinalysis concerning for acute cystitis, with culture revealing sensitivity only to ertapenem/meropenem.  Sent here for IV antibiotic treatment.  Treated with IV carbapenem/amikacin per ID  PTOT  Chronic heart failure with preserved ejection fraction (HFpEF) (HCC)  Wt Readings from Last 3 Encounters:   10/18/24 74.3 kg (163 lb 12.8 oz)   10/02/24 85.5 kg (188 lb 9.6 oz)   09/26/24 86.2 kg (190 lb)     Continue torsemide 30 mg daily  Euvolemic   Outpatient follow-up  Degenerative lumbar spinal stenosis  S/p spinal stimulator although per family no longer working  Continue baclofen and Percocet as needed  Type 2 diabetes mellitus with other skin complications (HCC)    Lab Results   Component Value Date    HGBA1C 7.4 (H) 10/12/2024   Hold home metformin while admitted, start correctional insulin coverage with Accu-Cheks  Carb controlled diet  Resume metformin at discharge  Chronic pain disorder  PDMP and facility paperwork reviewed, patient on Percocet 10/325 mg as needed  Continue oxycodone prn at discharge   Hypertension  Blood pressure stable  Continue torsemide and Toprol  Hypothyroidism  Chronic and stable, continue home dose levothyroxine  Cerebrovascular accident (CVA), unspecified mechanism (HCC)  No new focal deficits appreciated, continue ASA/statin  Chronic obstructive  pulmonary disease, unspecified COPD type (HCC)  Not in acute exacerbation, continue as needed albuterol inhaler  Stage 3a chronic kidney disease (HCC)  Lab Results   Component Value Date    EGFR 37 10/30/2024    EGFR 42 10/28/2024    EGFR 43 10/27/2024    CREATININE 1.33 (H) 10/30/2024    CREATININE 1.20 10/28/2024    CREATININE 1.18 10/27/2024   Baseline creatinine appearing 1.1-1.3, renal function currently near baseline.  Monitor with BMP  Avoid/limit nephrotoxins and avoid hypotension, renally dose medications as needed  Urinary retention  Recently Bateman catheter was removed at rehab however noted to have retention again in the hospital for which Bateman catheter was placed back  Discussed with urology who will help coordinate outpatient follow-up as scheduled on 11/6 for outpatient voiding trial  Pain in both lower extremities  Bilateral lower extremity pain likely neuropathic etiology  Discussed with ID with low suspicion for cellulitis, monitoring off antibiotics  Start gabapentin 100 mg at bedtime and uptitrate as tolerated.  Patient previously was on gabapentin however this was discontinued as it made her feel weird.  Will slowly uptitrate as tolerated.  Bilateral lower extremity duplex finalized negative for DVT     Medical Problems       Resolved Problems  Date Reviewed: 10/26/2024   None       Discharging Physician / Practitioner: May Alvarado DO  PCP: NANY Pollock  Admission Date:   Admission Orders (From admission, onward)       Ordered        10/25/24 2034  INPATIENT ADMISSION  Once                          Discharge Date: 10/30/24    Consultations During Hospital Stay:  ID    Procedures Performed:   None     Significant Findings / Test Results:    VAS VENOUS DUPLEX - LOWER LIMB BILATERAL    Result Date: 10/30/2024  Narrative:  [facility] REPORT [facility]INICAL: Indications: Patient presents with bilateral lower extremity pain and swelling for months. Operative History: 2024-03-02  Cardiac loop recorder Risk Factors The patient has history of Obesity, HTN, Diabetes (NIDDM (oral meds)), Hyperlipidemia and smoking (quit >10yrs ago).   CONCLUSION: Impression: RIGHT LOWER LIMB: No evidence of acute or chronic deep vein thrombosis. No evidence of superficial thrombophlebitis noted. Doppler evaluation shows a normal response to augmentation maneuvers.. Popliteal, posterior tibial and anterior tibial arterial Doppler waveforms are triphasic.  LEFT LOWER LIMB: No evidence of acute or chronic deep vein thrombosis. No evidence of superficial thrombophlebitis noted. Doppler evaluation shows a normal response to augmentation maneuvers. Popliteal, posterior tibial and anterior tibial arterial Doppler waveforms are triphasic.  SIGNATURE: Electronically Signed by: ZACKERY MEDEIROS on 2024-10-30 03:56:51 PM    Incidental Findings:   None      Test Results Pending at Discharge (will require follow up):   None      Outpatient Tests Requested:  None     Complications:  none     Reason for Admission: UTI    Hospital Course:   Mattie Varela is a 79 y.o. female patient who originally presented to the hospital on 10/25/2024 due to concern for acute cystitis with outpatient urine culture concerning for ESBL Klebsiella pneumonia.  Prior to admission, patient had a recent Bateman catheter placement for urinary retention which was removed 2 days prior to admission due to hematuria.  Upon arrival to the hospital, patient was again noted to have urinary retention with signs of UTI for which ID was consulted.  Patient was treated with carbapenem and IV amikacin per ID recommendations.  Patient was treated for Klebsiella cystitis associated with indwelling Bateman catheter secondary to urinary retention.  Patient reported lower extremity edema, pain, erythema.  Diuretic dose increased to torsemide 30 mg daily for increased edema in the leg. No signs of cellulitis per ID on repeat evaluation and patient was initiated on  gabapentin at bedtime for neuropathy pain. Patient will need repeat outpatient voiding trial with urology as scheduled for 11/6. Stable for dc to rehab.       Please see above list of diagnoses and related plan for additional information.     Condition at Discharge: stable    Discharge Day Visit / Exam:   * Please refer to separate progress note for these details *    Discussion with Family: Updated  (daughter) via phone.    Discharge instructions/Information to patient and family:   See after visit summary for information provided to patient and family.      Provisions for Follow-Up Care:  See after visit summary for information related to follow-up care and any pertinent home health orders.      Mobility at time of Discharge:   Basic Mobility Inpatient Raw Score: 15  JH-HLM Goal: 4: Move to chair/commode  JH-HLM Achieved: 4: Move to chair/commode  HLM Goal achieved. Continue to encourage appropriate mobility.     Disposition:   Other Skilled Nursing Facility at [facility]    Planned Readmission: none    Discharge Medications:  See after visit summary for reconciled discharge medications provided to patient and/or family.      Administrative Statements   Discharge Statement:  I have spent a total time of 35 minutes in caring for this patient on the day of the visit/encounter. >30 minutes of time was spent on: Impressions, Counseling / Coordination of care, Documenting in the medical record, Reviewing / ordering tests, medicine, procedures  , and Communicating with other healthcare professionals .    **Please Note: This note may have been constructed using a voice recognition system**

## 2024-10-30 NOTE — PLAN OF CARE
Problem: PAIN - ADULT  Goal: Verbalizes/displays adequate comfort level or baseline comfort level  Description: Interventions:  - Encourage patient to monitor pain and request assistance  - Assess pain using appropriate pain scale  - Administer analgesics based on type and severity of pain and evaluate response  - Implement non-pharmacological measures as appropriate and evaluate response  - Consider cultural and social influences on pain and pain management  - Notify physician/advanced practitioner if interventions unsuccessful or patient reports new pain  Outcome: Progressing     Problem: INFECTION - ADULT  Goal: Absence or prevention of progression during hospitalization  Description: INTERVENTIONS:  - Assess and monitor for signs and symptoms of infection  - Monitor lab/diagnostic results  - Monitor all insertion sites, i.e. indwelling lines, tubes, and drains  - Monitor endotracheal if appropriate and nasal secretions for changes in amount and color  - [place] appropriate cooling/warming therapies per order  - Administer medications as ordered  - Instruct and encourage patient and family to use good hand hygiene technique  - Identify and instruct in appropriate isolation precautions for identified infection/condition  Outcome: Progressing     Problem: SAFETY ADULT  Goal: Patient will remain free of falls  Description: INTERVENTIONS:  - Educate patient/family on patient safety including physical limitations  - Instruct patient to call for assistance with activity   - Consult OT/PT to assist with strengthening/mobility   - Keep Call bell within reach  - Keep bed low and locked with side rails adjusted as appropriate  - Keep care items and personal belongings within reach  - Initiate and maintain comfort rounds  - Make Fall Risk Sign visible to staff  - Offer Toileting every 2 Hours, in advance of need  - Initiate/Maintain bed/chair alarm  - Obtain necessary fall risk management equipment: walker  Problem:  DISCHARGE PLANNING  Goal: Discharge to home or other facility with appropriate resources  Description: INTERVENTIONS:  - Identify barriers to discharge w/patient and caregiver  - Arrange for needed discharge resources and transportation as appropriate  - Identify discharge learning needs (meds, wound care, etc.)  - Arrange for interpretive services to assist at discharge as needed  - Refer to Case Management Department for coordinating discharge planning if the patient needs post-hospital services based on physician/advanced practitioner order or complex needs related to functional status, cognitive ability, or social support system  Outcome: Progressing     - Apply yellow socks and bracelet for high fall risk patients  - Consider moving patient to room near nurses station  Outcome: Progressing

## 2024-10-30 NOTE — ASSESSMENT & PLAN NOTE
Lab Results   Component Value Date    HGBA1C 7.4 (H) 10/12/2024   Hold home metformin while admitted, start correctional insulin coverage with Accu-Cheks  Carb controlled diet  Resume metformin at discharge

## 2024-10-30 NOTE — ASSESSMENT & PLAN NOTE
PDMP and facility paperwork reviewed, patient on Percocet 10/325 mg as needed  Continue oxycodone prn at discharge

## 2024-10-30 NOTE — ASSESSMENT & PLAN NOTE
Present on admission, patient presenting from SNF without catheter however patient previously had ahn catheter removed 2 days prior to admission. On admission, patient was reporting complaints of suprapubic tenderness/urinary urgency/burning with urination.  Patient otherwise afebrile and nontoxic-appearing. Concern for catheter associated klebsiella cystitis secondary to urinary retention.   Outpatient urinalysis concerning for acute cystitis, with culture revealing sensitivity only to ertapenem/meropenem.  Sent here for IV antibiotic treatment.  Treated with IV carbapenem/amikacin per ID  PTOT

## 2024-10-30 NOTE — ASSESSMENT & PLAN NOTE
Patient currently has bilateral and symmetric subjective pain in her anterior legs.  She has stable chronic venous stasis changes but no evidence of cellulitis clinically.  There is no open draining wound.  I suspect that her symmetric lower leg pain is secondary to neuropathy, superimposed on venous stasis.  No antibiotic needed.  Serial exams.  Pain control per primary service.

## 2024-10-30 NOTE — ASSESSMENT & PLAN NOTE
Present on admission, patient presenting from SNF with indwelling Bateman secondary to urinary retention additionally with complaints of suprapubic tenderness/urinary urgency/burning with urination.  Patient otherwise afebrile and nontoxic-appearing  Outpatient urinalysis concerning for acute cystitis, with culture revealing sensitivity only to ertapenem/meropenem.  Sent here for IV antibiotic treatment  Spoke with the daughter who is a PCP with our network Dr. Baires.  Discussed with CM. Patient will need str, pending placement.   Treated with IV carbapenem/amikacin per ID  PTOT

## 2024-10-30 NOTE — PLAN OF CARE
Problem: OCCUPATIONAL THERAPY ADULT  Goal: Performs self-care activities at highest level of function for planned discharge setting.  See evaluation for individualized goals.  Description: Treatment Interventions: ADL retraining, Functional transfer training, Cognitive reorientation, Endurance training, Continued evaluation, Energy conservation          See flowsheet documentation for full assessment, interventions and recommendations.   Outcome: Progressing  Note: Limitation: Decreased ADL status, Decreased Safe judgement during ADL, Decreased endurance, Decreased self-care trans, Decreased high-level ADLs  Prognosis: Fair  Assessment: Pt seen for skilled OT treatment session on this date w/ interventions focusing on ADL participation, activity tolerance, standing tolerance, standing balance, transfer skills, and fxnl mobility. Pt was agreeable and willing to participate in session. Pt engaged in the following tasks: Supervision grooming, UB dressing; CGA STS, FM; Mod A LB bathing/dressing. In comparison to previous session, pt demonstrated improvements in standing tolerance at the sink for functional tasks. Pt required ocassional safety reminders. Pt continues to be functioning below baseline level as occupational performance remains limited by decreased ADL status, decreased activity tolerance, decreased standing balance, decreased transfer skills, decreased fxnl mobility, and pain. The patient's raw score on the AM-PAC Daily Activity Inpatient Short Form is 17. A raw score of less than 19 suggests the patient may benefit from discharge to post-acute rehabilitation services. Please refer to the recommendation of the Occupational Therapist for safe discharge planning.  From OT standpoint, recommend Level II (Moderate Resource Intensity) at time of d/c. Pt will benefit from continued OT treatment while in acute care to address deficits as defined above and maximize level of functional independence with ADLs and  functional mobility. Pt left OOB in chair w/ alarm on and all needs within reach at end of session.     Rehab Resource Intensity Level, OT: II (Moderate Resource Intensity)

## 2024-10-31 ENCOUNTER — TRANSITIONAL CARE MANAGEMENT (OUTPATIENT)
Dept: FAMILY MEDICINE CLINIC | Facility: CLINIC | Age: 79
End: 2024-10-31

## 2024-11-04 NOTE — TELEPHONE ENCOUNTER
Spoke directly with patient who stated that [facility] informed them that they had an appointment 11/6 and that they had set up transportation for patient.

## 2024-11-08 ENCOUNTER — PROCEDURE VISIT (OUTPATIENT)
Dept: UROLOGY | Facility: CLINIC | Age: 79
End: 2024-11-08
Payer: MEDICARE

## 2024-11-08 VITALS — HEIGHT: 60 IN | BODY MASS INDEX: 31.99 KG/M2 | DIASTOLIC BLOOD PRESSURE: 98 MMHG | SYSTOLIC BLOOD PRESSURE: 160 MMHG

## 2024-11-08 DIAGNOSIS — R33.9 URINARY RETENTION: Primary | ICD-10-CM

## 2024-11-08 LAB — POST-VOID RESIDUAL VOLUME, ML POC: 331 ML

## 2024-11-08 PROCEDURE — 51798 US URINE CAPACITY MEASURE: CPT

## 2024-11-08 PROCEDURE — 51702 INSERT TEMP BLADDER CATH: CPT

## 2024-11-08 PROCEDURE — 99024 POSTOP FOLLOW-UP VISIT: CPT

## 2024-11-08 NOTE — PROGRESS NOTES
11/8/2024    Mattie Varela  1945  [id number]    Diagnosis      Patient presents for tov managed by our office    Plan  Plan to follow up with next TOV as scheduled     Procedure Bateman removal/voiding trial    Bateman catheter removed after deflation of an intact balloon. Patient tolerated well. Encouraged patient to hydrate well and return this afternoon for post void residual.  she knows she may return early if uncomfortable and unable to urinate. Patient agrees to this plan.    Patient returned this afternoon. Patient states unable to void. Patient voided while in office. Bladder ultrasound performed and PVR measured 331ml.    Recent Results (from the past 4 hour(s))   POCT Measure PVR    Collection Time: 11/08/24  2:17 PM   Result Value Ref Range    POST-VOID RESIDUAL VOLUME, ML  mL         Vitals:    11/08/24 0821   BP: 160/98   Height: 5' (1.524 m)       Universal Protocol:  procedure performed by consultantConsent: Verbal consent obtained.  Risks and benefits: risks, benefits and alternatives were discussed  Consent given by: patient  Patient understanding: patient states understanding of the procedure being performed  Patient consent: the patient's understanding of the procedure matches consent given  Procedure consent: procedure consent matches procedure scheduled  Patient identity confirmed: verbally with patient    Bladder catheterization    Date/Time: 11/8/2024 8:00 AM    Performed by: Maia Tyler RN  Authorized by: Alan Ren MD    Patient location:  Bedside  Consent:     Consent given by:  Patient  Universal protocol:     Procedure explained and questions answered to patient or proxy's satisfaction: yes      Patient identity confirmed:  Verbally with patient  Pre-procedure details:     Procedure purpose:  Therapeutic  Procedure details:     Catheter insertion:  Indwelling    Catheter type:  Bateman and latex    Catheter size:  16 Fr    Number of attempts:  1    Successful placement: yes       Urine characteristics:  Yellow  Post-procedure details:     Patient tolerance of procedure:  Tolerated well, no immediate complications       LENNY Cho advised for another TOV in 2 weeks- scheduled.   Maia Tyler RN

## 2024-11-10 PROBLEM — N30.00 ACUTE CYSTITIS: Status: RESOLVED | Noted: 2024-10-11 | Resolved: 2024-11-10

## 2024-11-15 ENCOUNTER — APPOINTMENT (EMERGENCY)
Dept: CT IMAGING | Facility: HOSPITAL | Age: 79
End: 2024-11-15
Payer: MEDICARE

## 2024-11-15 ENCOUNTER — APPOINTMENT (EMERGENCY)
Dept: RADIOLOGY | Facility: HOSPITAL | Age: 79
End: 2024-11-15
Payer: MEDICARE

## 2024-11-15 ENCOUNTER — HOSPITAL ENCOUNTER (EMERGENCY)
Facility: HOSPITAL | Age: 79
Discharge: HOME | End: 2024-11-15
Attending: EMERGENCY MEDICINE
Payer: MEDICARE

## 2024-11-15 VITALS
SYSTOLIC BLOOD PRESSURE: 161 MMHG | OXYGEN SATURATION: 97 % | DIASTOLIC BLOOD PRESSURE: 74 MMHG | RESPIRATION RATE: 16 BRPM | BODY MASS INDEX: 31.99 KG/M2 | TEMPERATURE: 98.1 F | HEIGHT: 60 IN | HEART RATE: 67 BPM

## 2024-11-15 DIAGNOSIS — S82.002A LEFT PATELLA FRACTURE: Primary | ICD-10-CM
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
## 2024-11-15 DIAGNOSIS — M54.50 LOW BACK PAIN: ICD-10-CM

## 2024-11-15 DIAGNOSIS — Z98.1 S/P LUMBAR SPINAL FUSION: ICD-10-CM

## 2024-11-15 DIAGNOSIS — S09.90XA INJURY OF HEAD, INITIAL ENCOUNTER: ICD-10-CM

## 2024-11-15 PROCEDURE — 70450 CT HEAD/BRAIN W/O DYE: CPT

## 2024-11-15 PROCEDURE — 72125 CT NECK SPINE W/O DYE: CPT

## 2024-11-15 PROCEDURE — 99284 EMERGENCY DEPT VISIT MOD MDM: CPT | Performed by: EMERGENCY MEDICINE

## 2024-11-15 PROCEDURE — 72131 CT LUMBAR SPINE W/O DYE: CPT

## 2024-11-15 PROCEDURE — 99284 EMERGENCY DEPT VISIT MOD MDM: CPT

## 2024-11-15 PROCEDURE — 73564 X-RAY EXAM KNEE 4 OR MORE: CPT

## 2024-11-15 RX ORDER — OXYCODONE HYDROCHLORIDE 5 MG/1
5 TABLET ORAL ONCE
Refills: 0 | Status: COMPLETED | OUTPATIENT
Start: 2024-11-15 | End: 2024-11-15

## 2024-11-15 RX ADMIN — OXYCODONE HYDROCHLORIDE 5 MG: 5 TABLET ORAL at 07:00

## 2024-11-15 NOTE — ED PROVIDER NOTES
Time reflects when diagnosis was documented in both MDM as applicable and the Disposition within this note       Time User Action Codes Description Comment    11/15/2024  8:57 AM Anne Marie Verma Add [S82.002A] Left patella fracture     11/15/2024  8:57 AM Anne Marie Verma Add [S09.90XA] Injury of head, initial encounter     11/15/2024  8:57 AM Anne Marie Verma Add [M54.50] Low back pain     11/15/2024  8:57 AM Anne Marie Verma Add [Z98.1] S/P lumbar spinal fusion     11/15/2024  8:58 AM Anne Marie Verma Add [W19.XXXA] Fall, initial encounter           ED Disposition       ED Disposition   Discharge    Condition   Stable    Date/Time   Fri Nov 15, 2024  8:57 AM    Comment   Mattie Varela discharge to home/self care.                   Assessment & Plan       Medical Decision Making  An 79-year-old female presents following a fall that occurred yesterday, now with headache, left knee pain and acute on chronic low back pain.  She is awake and alert, although slow to answer questions.  Will proceed with imaging to evaluate for traumatic injury.  Will give patient home dose of oxycodone for pain.    Amount and/or Complexity of Data Reviewed  Radiology: ordered and independent interpretation performed. Decision-making details documented in ED Course.    Risk  Prescription drug management.        ED Course as of 11/15/24 1442   Fri Nov 15, 2024   0750 XR knee 4+ views left injury  Pt and daughter updated on fractured patella, will place knee immobilizer.  They prefer discharge back to her assisted living today.   0803 CT cervical spine without contrast  No cervical spine fracture or traumatic malalignment.   0803 CT head without contrast  No acute intracranial abnormality.   0828 CT spine lumbar without contrast  Suboptimal assessment due to distorted image quality by beam hardening from surgical hardware.     1.) No definite acute osseous abnormality.  2.) Grossly stable extensive postsurgical change. Hardware is intact. Unable  to evaluate the canal due to artifactually distorted image quality.  3.) Osteopenic appearing bones. Correlate with DEXA exam.   0833 Spoke with emerald Schwab AP, reviewing pt's history, presentation and work up.  Agrees with plan and OP follow up.   0834 Pt and family updated on recommendations by ortho.  Will trial ambulation with walker and knee immobilizer, WBAT.  If pt can ambulate, plan for discharge.   0856 Pt ambulated with a walker, will proceed with discharge to her assisted living facility.         Medications   oxyCODONE (ROXICODONE) IR tablet 5 mg (5 mg Oral Given 11/15/24 0700)       ED Risk Strat Scores                           SBIRT 20yo+      Flowsheet Row Most Recent Value   Initial Alcohol Screen: US AUDIT-C     1. How often do you have a drink containing alcohol? 0 Filed at: 11/15/2024 0634   2. How many drinks containing alcohol do you have on a typical day you are drinking?  0 Filed at: 11/15/2024 0634   3a. Male UNDER 65: How often do you have five or more drinks on one occasion? 0 Filed at: 11/15/2024 0634   3b. FEMALE Any Age, or MALE 65+: How often do you have 4 or more drinks on one occassion? 0 Filed at: 11/15/2024 0634   Audit-C Score 0 Filed at: 11/15/2024 0634   DUNCAN: How many times in the past year have you...    Used an illegal drug or used a prescription medication for non-medical reasons? Never Filed at: 11/15/2024 0634                            History of Present Illness       Chief Complaint   Patient presents with    Fall     Patient reports falling yesterday while walking while at Rehab, was not evaluated for fall and sent back to [facility]. Pt struck head, face and knee. Denies LOC or blood thinners. Woke up this morning at 0400 with a headache and feeling dazed and confused.        Past Medical History:   Diagnosis Date    Acid reflux     Acute kidney injury (HCC)     Anemia     hx of iron-deficient    Anxiety     Arthritis     Asthma     last needed inhaler last year  2020    Basal cell carcinoma     upper lip    Chronic narcotic dependence (HCC)     Chronic pain     Colon polyp     Cystocele     Diabetes mellitus (HCC)     stable    Disease of thyroid gland     hypothyroidism    Diverticulosis     Dizziness     at times    Dysfunctional uterine bleeding     last assessed - [id number]    Dysphagia     Fibromyalgia     Gastric ulcer     Gastroparesis     History of colonic polyps     last assessed - [id number]    History of gastroesophageal reflux (GERD)     Hypercholesterolemia     Hyperlipidemia     Hypertension     Hyponatremia     IBS (irritable bowel syndrome)     Pneumobilia 06/18/2022    Post laminectomy syndrome     S/P insertion of spinal cord stimulator 07/18/2018    s/p [employer] loop recorder 3/2/2024 03/02/2024    Seasonal allergies     Shortness of breath     exertional    Spinal stenosis     Status post lumbar spinal fusion 03/16/2018    Stroke (HCC)     pt states slight stroke March 2022      Past Surgical History:   Procedure Laterality Date    APPENDECTOMY      BACK SURGERY      BREAST CYST EXCISION Left     CARDIAC CATHETERIZATION  11/14/2023    Procedure: Cardiac catheterization;  Surgeon: Randolph Holt MD;  [facility];  Service: Cardiology    CARDIAC CATHETERIZATION N/A 11/14/2023    Procedure: Cardiac Coronary Angiogram;  Surgeon: Randolph Holt MD;  [facility];  Service: Cardiology    CARDIAC ELECTROPHYSIOLOGY PROCEDURE N/A 3/2/2024    Procedure: Cardiac loop recorder implant;  Surgeon: Edu Fontaine MD;  [facility];  Service: Cardiology    CHOLECYSTECTOMY      COLONOSCOPY      ESOPHAGOGASTRODUODENOSCOPY N/A 09/28/2016    Procedure: ESOPHAGOGASTRODUODENOSCOPY (EGD);  Surgeon: Mylene Moeller MD;  Location: [facility];  Service:     HERNIA REPAIR      HYSTERECTOMY      TTAH-BSO age 28    LAMINECTOMY      LUMBAR LAMINECTOMY      OOPHORECTOMY      age 28    AK ARTHRODESIS POSTERIOR/PSTLAT TQ 1NTRSPC  THORACIC N/A 2018    Procedure: Reopening of lumbar incision for T12-L5 posterior instrumented fixation and fusion and T12-L4 posterior decompression;  Surgeon: Wood Rogel MD;  Location: [facility];  Service: Neurosurgery    CO COLONOSCOPY FLX DX W/COLLJ SPEC WHEN PFRMD N/A 2016    Procedure: EGD AND COLONOSCOPY;  Surgeon: Mylene Moeller MD;  [facility]: [facility];  Service: Gastroenterology    CO DILATION ESOPH UNGUIDED SOUND/BOUGIE 1/MULT PASS N/A 2016    Procedure: DILATATION ESOPHAGEAL;  Surgeon: Mylene Moeller MD;  [facility];  Service: Gastroenterology    CO ESOPHAGOGASTRODUODENOSCOPY TRANSORAL DIAGNOSTIC N/A 2016    Procedure: ESOPHAGOGASTRODUODENOSCOPY (EGD);  Surgeon: Mylene Moeller MD;  [facility];  Service: Gastroenterology    CO INSJ/RPLCMT SPINAL NPG/RCVR POCKET CRTJ&CONNJ Left 2018    Procedure: removal of left buttock implantable pulse generator and placement of new  implantable pulse generator;  Surgeon: Wood Rogel MD;  [facility];  Service: Neurosurgery      Family History   Problem Relation Age of Onset    Lung cancer Mother 46    Pulmonary embolism Father     No Known Problems Sister     No Known Problems Daughter     No Known Problems Daughter     Stroke Maternal Grandmother     Heart attack Maternal Grandfather     No Known Problems Paternal Grandmother     No Known Problems Paternal Grandfather     No Known Problems Maternal Aunt     Diabetes Family         Diabetes mellitus    Hypertension Family     Stroke Family         Stroke complications      Social History     Tobacco Use    Smoking status: Former     Current packs/day: 0.00     Types: Cigarettes     Quit date: 1970     Years since quittin.9    Smokeless tobacco: Never    Tobacco comments:     Denied history of current ever day smoker, Former smoker and Never smoker all documented in [employer]   Vaping Use    Vaping status: Never Used   Substance Use Topics    Alcohol  use: Not Currently     Comment: Denied history of alcohol use    Drug use: No     Comment: Denied history of drug use      E-Cigarette/Vaping    E-Cigarette Use Never User       E-Cigarette/Vaping Substances    Nicotine No     THC No     CBD No     Flavoring No     Other No     Unknown No       I have reviewed and agree with the history as documented.     An 79-year-old female with past medical history of diabetes, hypertension, hypothyroidism, asthma, CVA, CKD, COPD, CHF, HLD and fibromyalgia; presents following a fall that occurred yesterday.  Patient states her right knee gave way causing her to fall forward.  She did strike her head however denies loss of consciousness.  Since the fall she has had a worsening headache, back pain and left knee pain with swelling.  Daughter believes patient also seems somewhat off her baseline.  Otherwise denies fever, chills, chest pain, shortness of breath, abdominal pain, nausea, vomiting, diarrhea and rashes.  Of note, patient's fall occurred at a rehab facility just prior to discharge back to her assisted living.      History provided by:  Patient, medical records and relative      Review of Records  [facility] admit 10/11-19 for acute on chronic CHF, [facility]  Discharged to rehab  [facility] admit 10/25-30 for Klebsiella cystitis treated with IV carbapenem/amikacin   Urinary retention, discharged with ahn in place  Discharged back to rehab    Review of Systems   Musculoskeletal:  Positive for arthralgias (left knee) and back pain.   Neurological:  Positive for dizziness and headaches.   All other systems reviewed and are negative.    Objective       ED Triage Vitals   Temperature Pulse Blood Pressure Respirations SpO2 Patient Position - Orthostatic VS   11/15/24 0633 11/15/24 0633 11/15/24 0633 11/15/24 0633 11/15/24 0633 11/15/24 0700   98.1 °F (36.7 °C) 89 139/67 19 98 % Lying      Temp src Heart Rate Source BP Location FiO2 (%) Pain Score    -- 11/15/24 0700 11/15/24 0700 -- 11/15/24 0642      Monitor Right arm  9      Vitals      Date and Time Temp Pulse SpO2 Resp BP Pain Score FACES Pain Rating User   11/15/24 0800 -- 67 97 % 16 161/74 -- -- CO   11/15/24 0700 -- 60 98 % 16 118/55 9 -- CO   11/15/24 0642 -- -- -- -- -- 9 -- TP   11/15/24 0633 98.1 °F (36.7 °C) 89 98 % 19 139/67 -- -- EP            Physical Exam  General Appearance: alert and oriented x 3 although slow to respond to questioning, nad, non toxic appearing  Skin:  Warm, dry, intact.  No cyanosis  HEENT: Normocephalic.  Abrasion to left cheek.  No eye drainage.  Normal hearing.  Moist mucous membranes.    Neck: Supple, trachea midline  Cardiac: RRR; no murmurs, rub, gallops.  No pedal edema, 2+ pulses  Pulmonary: lungs CTAB; no wheezes, rales, rhonchi  Gastrointestinal: abdomen soft, nontender, nondistended; no guarding or rebound tenderness; good bowel sounds, no mass or bruits  Extremities:  No midline spinal tenderness.  Left knee with swelling to the anterior-medial aspect with tenderness to the lateral knee.  Remainder of extremities nontender.  No deformities.  No calf tenderness, no clubbing  Neuro:  no focal motor or sensory deficits, CN 2-12 grossly intact  Psych:  Normal mood and affect, normal judgement and insight       Results Reviewed       None            CT head without contrast   Final Interpretation by Julian Harrison MD (11/15 7163)      No acute intracranial abnormality.                  Workstation performed: [id number]         CT cervical spine without contrast   Final Interpretation by Julian Harrison MD (11/15 6922)      No cervical spine fracture or traumatic malalignment.                  Workstation performed: [id number]         CT spine lumbar without contrast   Final Interpretation by Hawa Collins MD (11/15 3693)      Suboptimal assessment due to distorted image quality by beam hardening from surgical hardware.      1.  No definite acute osseous abnormality.   2.  Grossly stable extensive postsurgical  change. Hardware is intact. Unable to evaluate the canal due to artifactually distorted image quality.   3.  Osteopenic appearing bones. Correlate with DEXA exam.         Workstation performed: [id number]         XR knee 4+ views left injury   ED Interpretation by Anne Marie Verma DO (11/15 2372)   Fractured patella.  Arthritic changes noted    Image independently interpreted by myself       Final Interpretation by Ray Douglass DO (11/15 1038)      Smooth linear lucencies coursing through the superior lateral aspect of the patella. 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      Additional degenerative changes and chondrocalcinosis.      The study was marked in EPIC for significant notification.         Computerized Assisted Algorithm (CAA) may have been used to analyze all applicable images.         Resident: Isac Lamb I, the attending radiologist, have reviewed the images and agree with the final report above.      Workstation performed: [id number]             Procedures    ED Medication and Procedure Management   Prior to Admission Medications   Prescriptions Last Dose Informant Patient Reported? Taking?   Blood Glucose Monitoring Suppl (OneTouch Verio Reflect) w/Device KIT  Self No No   Sig: Check blood sugars twice daily. Please substitute with appropriate alternative as covered by patient's insurance. Dx: E11.65   MAGNESIUM OXIDE 400 PO  Self Yes No   Sig: Take 400 mg by mouth 2 (two) times a day   Milnacipran HCl (Savella) 100 MG TABS  Self No No   Sig: Take 1 tablet (100 mg total) by mouth daily   OneTouch Delica Lancets 33G MISC  Self No No   Sig: Check blood sugars twice daily. Please substitute with appropriate alternative as covered by patient's insurance. Dx: E11.65   acetaminophen (TYLENOL) 325 mg tablet   No No    Sig: Take 3 tablets (975 mg total) by mouth every 8 (eight) hours   albuterol (PROVENTIL HFA,VENTOLIN HFA) 90 mcg/act inhaler  Self No No   Sig: Inhale 2 puffs every 6 (six) hours as needed for wheezing or shortness of breath   aluminum-magnesium hydroxide-simethicone (MAALOX) 7114-9228-620 mg/30 mL suspension   No No   Sig: Take 30 mL by mouth every 6 (six) hours as needed for indigestion or heartburn   aspirin 81 mg chewable tablet  Self Yes No   Sig: Chew 81 mg daily   atorvastatin (LIPITOR) 40 mg tablet  Self No No   Sig: TAKE ONE TABLET BY MOUTH ONCE DAILY   Patient taking differently: Take 40 mg by mouth daily   baclofen 10 mg tablet  Self Yes No   Sig: Take 10 mg by mouth 2 (two) times a day as needed for muscle spasms   docusate sodium (COLACE) 100 mg capsule  Self No No   Sig: Take 1 capsule (100 mg total) by mouth 2 (two) times a day   ferrous sulfate 325 (65 Fe) mg tablet  Self No No   Sig: Take 1 tablet (325 mg total) by mouth daily with breakfast   gabapentin (NEURONTIN) 100 mg capsule   No No   Sig: Take 1 capsule (100 mg total) by mouth daily at bedtime   glucose blood (OneTouch Verio) test strip  Self No No   Sig: Check blood sugars twice daily. Please substitute with appropriate alternative as covered by patient's insurance. Dx: E11.65   latanoprost (XALATAN) 0.005 % ophthalmic solution  Self Yes No   Sig: Administer 1 drop to both eyes daily at bedtime   levothyroxine 75 mcg tablet  Self No No   Sig: Take 0.5 tablets (37.5 mcg total) by mouth daily in the early morning   lidocaine (LIDODERM) 5 %   No No   Sig: Apply 1 patch topically over 12 hours daily Remove & Discard patch within 12 hours or as directed by MD   meclizine (ANTIVERT) 25 mg tablet  Self No No   Sig: Take 1 tablet (25 mg total) by mouth 4 (four) times a day as needed for dizziness   Patient taking differently: Take 25 mg by mouth daily as needed for dizziness   metFORMIN (GLUCOPHAGE) 500 mg tablet   No No   Sig: Take 1 tablet (500  mg total) by mouth 2 (two) times a day with meals   metoprolol succinate (TOPROL-XL) 50 mg 24 hr tablet  Self No No   Sig: Take 1 tablet (50 mg total) by mouth every 12 (twelve) hours   oxyCODONE (ROXICODONE) 10 MG TABS   No No   Sig: Take 1 tablet (10 mg total) by mouth every 6 (six) hours as needed for severe pain Max Daily Amount: 40 mg   pantoprazole (PROTONIX) 40 mg tablet  Self No No   Sig: Take 1 tablet (40 mg total) by mouth daily   polyethylene glycol (MIRALAX) 17 g packet  Self No No   Sig: Take 17 g by mouth daily   senna (SENOKOT) 8.6 mg  Self No No   Sig: Take 2 tablets (17.2 mg total) by mouth daily at bedtime   sucralfate (CARAFATE) 1 g tablet   No No   Sig: Take 1 tablet (1 g total) by mouth 2 (two) times a day before meals for 7 days   temazepam (RESTORIL) 15 mg capsule   No No   Sig: Take 1 capsule (15 mg total) by mouth daily at bedtime as needed for sleep   torsemide (DEMADEX) 10 mg tablet   No No   Sig: Take 3 tablets (30 mg total) by mouth daily      Facility-Administered Medications: None     Discharge Medication List as of 11/15/2024  9:00 AM        CONTINUE these medications which have NOT CHANGED    Details   acetaminophen (TYLENOL) 325 mg tablet Take 3 tablets (975 mg total) by mouth every 8 (eight) hours, Starting Sat 10/19/2024, No Print      albuterol (PROVENTIL HFA,VENTOLIN HFA) 90 mcg/act inhaler Inhale 2 puffs every 6 (six) hours as needed for wheezing or shortness of breath, Starting Wed 6/29/2022, Normal      aluminum-magnesium hydroxide-simethicone (MAALOX) 1612-7199-562 mg/30 mL suspension Take 30 mL by mouth every 6 (six) hours as needed for indigestion or heartburn, Starting Sat 10/19/2024, No Print      aspirin 81 mg chewable tablet Chew 81 mg daily, Historical Med      atorvastatin (LIPITOR) 40 mg tablet TAKE ONE TABLET BY MOUTH ONCE DAILY, Normal      baclofen 10 mg tablet Take 10 mg by mouth 2 (two) times a day as needed for muscle spasms, Historical Med      Blood Glucose  Monitoring Suppl (OneTouch Verio Reflect) w/Device KIT Check blood sugars twice daily. Please substitute with appropriate alternative as covered by patient's insurance. Dx: E11.65, Normal      docusate sodium (COLACE) 100 mg capsule Take 1 capsule (100 mg total) by mouth 2 (two) times a day, Starting Wed 6/5/2024, No Print      ferrous sulfate 325 (65 Fe) mg tablet Take 1 tablet (325 mg total) by mouth daily with breakfast, Starting Wed 1/10/2024, Print      gabapentin (NEURONTIN) 100 mg capsule Take 1 capsule (100 mg total) by mouth daily at bedtime, Starting Wed 10/30/2024, No Print      glucose blood (OneTouch Verio) test strip Check blood sugars twice daily. Please substitute with appropriate alternative as covered by patient's insurance. Dx: E11.65, Normal      latanoprost (XALATAN) 0.005 % ophthalmic solution Administer 1 drop to both eyes daily at bedtime, Historical Med      levothyroxine 75 mcg tablet Take 0.5 tablets (37.5 mcg total) by mouth daily in the early morning, Starting Wed 1/17/2024, Until Fri 10/11/2024, No Print      lidocaine (LIDODERM) 5 % Apply 1 patch topically over 12 hours daily Remove & Discard patch within 12 hours or as directed by MD, Starting Sun 10/20/2024, No Print      MAGNESIUM OXIDE 400 PO Take 400 mg by mouth 2 (two) times a day, Historical Med      meclizine (ANTIVERT) 25 mg tablet Take 1 tablet (25 mg total) by mouth 4 (four) times a day as needed for dizziness, Starting Sun 3/26/2023, Until Fri 10/11/2024 at 2359, Normal      metFORMIN (GLUCOPHAGE) 500 mg tablet Take 1 tablet (500 mg total) by mouth 2 (two) times a day with meals, Starting Sat 10/19/2024, No Print      metoprolol succinate (TOPROL-XL) 50 mg 24 hr tablet Take 1 tablet (50 mg total) by mouth every 12 (twelve) hours, Starting Wed 11/15/2023, Normal      Milnacipran HCl (Savella) 100 MG TABS Take 1 tablet (100 mg total) by mouth daily, Starting Sun 3/5/2023, Normal      OneTouch Delica Lancets 33G MISC Check  blood sugars twice daily. Please substitute with appropriate alternative as covered by patient's insurance. Dx: E11.65, Normal      oxyCODONE (ROXICODONE) 10 MG TABS Take 1 tablet (10 mg total) by mouth every 6 (six) hours as needed for severe pain Max Daily Amount: 40 mg, Starting Wed 10/30/2024, Print      pantoprazole (PROTONIX) 40 mg tablet Take 1 tablet (40 mg total) by mouth daily, Starting Wed 1/10/2024, Until Fri 10/11/2024, No Print      polyethylene glycol (MIRALAX) 17 g packet Take 17 g by mouth daily, Starting Wed 6/5/2024, No Print      senna (SENOKOT) 8.6 mg Take 2 tablets (17.2 mg total) by mouth daily at bedtime, Starting Wed 6/5/2024, No Print      sucralfate (CARAFATE) 1 g tablet Take 1 tablet (1 g total) by mouth 2 (two) times a day before meals for 7 days, Starting Sat 10/19/2024, Until Sat 10/26/2024, No Print      temazepam (RESTORIL) 15 mg capsule Take 1 capsule (15 mg total) by mouth daily at bedtime as needed for sleep, Starting Wed 10/30/2024, Print      torsemide (DEMADEX) 10 mg tablet Take 3 tablets (30 mg total) by mouth daily, Starting u 10/31/2024, No Print             ED SEPSIS DOCUMENTATION   Time reflects when diagnosis was documented in both MDM as applicable and the Disposition within this note       Time User Action Codes Description Comment    11/15/2024  8:57 AM Anne Marie Verma Add [S82.002A] Left patella fracture     11/15/2024  8:57 AM Anne Marie Verma Add [S09.90XA] Injury of head, initial encounter     11/15/2024  8:57 AM Anne Marie Verma Add [M54.50] Low back pain     11/15/2024  8:57 AM Anne Marie Verma Add [Z98.1] S/P lumbar spinal fusion     11/15/2024  8:58 AM Anne Marie Verma Add [W19.XXXA] Fall, initial encounter                  Anne Marie Verma, DO  11/15/24 1442

## 2024-11-15 NOTE — DISCHARGE INSTRUCTIONS
"Continue to wear knee immobilizer until evaluated by orthopedics.   Mattie should remain \"Weight bear as tolerated to the left lower extremity\"  "

## 2024-11-16 ENCOUNTER — TELEPHONE (OUTPATIENT)
Dept: OTHER | Facility: OTHER | Age: 79
End: 2024-11-16

## 2024-11-16 NOTE — TELEPHONE ENCOUNTER
Patient is calling regarding cancelling an appointment.    Date/Time: 11/22/24 @ 8:30a    Patient was rescheduled: YES [] NO [x]    Patient requesting call back to reschedule: YES [] NO [x]    Pt stated she needs to cancel because her home health aid will be helping with her catheter.

## 2024-11-17 ENCOUNTER — HOSPITAL ENCOUNTER (INPATIENT)
Facility: HOSPITAL | Age: 79
LOS: 15 days | Discharge: NURSING, REHAB OR LONG-TERM CARE | DRG: 562 | End: 2024-12-02
Attending: EMERGENCY MEDICINE | Admitting: FAMILY MEDICINE
Payer: MEDICARE

## 2024-11-17 ENCOUNTER — APPOINTMENT (EMERGENCY)
Dept: RADIOLOGY | Facility: HOSPITAL | Age: 79
DRG: 562 | End: 2024-11-17
Payer: MEDICARE

## 2024-11-17 ENCOUNTER — APPOINTMENT (INPATIENT)
Dept: CT IMAGING | Facility: HOSPITAL | Age: 79
DRG: 562 | End: 2024-11-17
Payer: MEDICARE

## 2024-11-17 DIAGNOSIS — Z96.89 S/P INSERTION OF SPINAL CORD STIMULATOR: ICD-10-CM

## 2024-11-17 DIAGNOSIS — R06.00 DYSPNEA: ICD-10-CM

## 2024-11-17 DIAGNOSIS — I50.32 CHRONIC HEART FAILURE WITH PRESERVED EJECTION FRACTION (HFPEF) (HCC): ICD-10-CM

## 2024-11-17 DIAGNOSIS — I50.9 CHF EXACERBATION (HCC): Primary | ICD-10-CM

## 2024-11-17 DIAGNOSIS — R53.83 FATIGUE: ICD-10-CM

## 2024-11-17 DIAGNOSIS — G89.4 CHRONIC PAIN DISORDER: ICD-10-CM

## 2024-11-17 DIAGNOSIS — N18.9 ACUTE KIDNEY INJURY SUPERIMPOSED ON CHRONIC KIDNEY DISEASE  (HCC): ICD-10-CM

## 2024-11-17 DIAGNOSIS — M54.9 BACK PAIN: ICD-10-CM

## 2024-11-17 DIAGNOSIS — N17.9 ACUTE KIDNEY INJURY SUPERIMPOSED ON CHRONIC KIDNEY DISEASE  (HCC): ICD-10-CM

## 2024-11-17 DIAGNOSIS — M48.061 DEGENERATIVE LUMBAR SPINAL STENOSIS: ICD-10-CM

## 2024-11-17 DIAGNOSIS — S82.002A CLOSED NONDISPLACED FRACTURE OF LEFT PATELLA, UNSPECIFIED FRACTURE MORPHOLOGY, INITIAL ENCOUNTER: ICD-10-CM

## 2024-11-17 DIAGNOSIS — M25.569 KNEE PAIN: ICD-10-CM

## 2024-11-17 DIAGNOSIS — N17.9 AKI (ACUTE KIDNEY INJURY) (HCC): ICD-10-CM

## 2024-11-17 LAB
2HR DELTA HS TROPONIN: 1 NG/L
4HR DELTA HS TROPONIN: 0 NG/L
ALBUMIN SERPL BCG-MCNC: 3.4 G/DL (ref 3.5–5)
ALP SERPL-CCNC: 101 U/L (ref 34–104)
ALT SERPL W P-5'-P-CCNC: 8 U/L (ref 7–52)
AMORPH URATE CRY URNS QL MICRO: ABNORMAL
ANION GAP SERPL CALCULATED.3IONS-SCNC: 7 MMOL/L (ref 4–13)
AST SERPL W P-5'-P-CCNC: 13 U/L (ref 13–39)
ATRIAL RATE: 77 BPM
ATRIAL RATE: 79 BPM
BACTERIA UR QL AUTO: ABNORMAL /HPF
BASOPHILS # BLD AUTO: 0.04 THOUSANDS/ÂΜL (ref 0–0.1)
BASOPHILS NFR BLD AUTO: 1 % (ref 0–1)
BILIRUB SERPL-MCNC: 0.37 MG/DL (ref 0.2–1)
BILIRUB UR QL STRIP: NEGATIVE
BNP SERPL-MCNC: 265 PG/ML (ref 0–100)
BUN SERPL-MCNC: 24 MG/DL (ref 5–25)
CALCIUM ALBUM COR SERPL-MCNC: 9.9 MG/DL (ref 8.3–10.1)
CALCIUM SERPL-MCNC: 9.4 MG/DL (ref 8.4–10.2)
CARDIAC TROPONIN I PNL SERPL HS: 12 NG/L (ref ?–50)
CARDIAC TROPONIN I PNL SERPL HS: 12 NG/L (ref ?–50)
CARDIAC TROPONIN I PNL SERPL HS: 13 NG/L (ref ?–50)
CHLORIDE SERPL-SCNC: 98 MMOL/L (ref 96–108)
CLARITY UR: ABNORMAL
CO2 SERPL-SCNC: 32 MMOL/L (ref 21–32)
COLOR UR: YELLOW
CREAT SERPL-MCNC: 1.23 MG/DL (ref 0.6–1.3)
EOSINOPHIL # BLD AUTO: 0.24 THOUSAND/ÂΜL (ref 0–0.61)
EOSINOPHIL NFR BLD AUTO: 4 % (ref 0–6)
ERYTHROCYTE [DISTWIDTH] IN BLOOD BY AUTOMATED COUNT: 13.9 % (ref 11.6–15.1)
FLUAV AG UPPER RESP QL IA.RAPID: NEGATIVE
FLUBV AG UPPER RESP QL IA.RAPID: NEGATIVE
GFR SERPL CREATININE-BSD FRML MDRD: 41 ML/MIN/1.73SQ M
GLUCOSE SERPL-MCNC: 113 MG/DL (ref 65–140)
GLUCOSE SERPL-MCNC: 135 MG/DL (ref 65–140)
GLUCOSE SERPL-MCNC: 200 MG/DL (ref 65–140)
GLUCOSE UR STRIP-MCNC: NEGATIVE MG/DL
HCT VFR BLD AUTO: 31.7 % (ref 34.8–46.1)
HGB BLD-MCNC: 10 G/DL (ref 11.5–15.4)
HGB UR QL STRIP.AUTO: ABNORMAL
IMM GRANULOCYTES # BLD AUTO: 0.03 THOUSAND/UL (ref 0–0.2)
IMM GRANULOCYTES NFR BLD AUTO: 1 % (ref 0–2)
KETONES UR STRIP-MCNC: NEGATIVE MG/DL
LEUKOCYTE ESTERASE UR QL STRIP: ABNORMAL
LYMPHOCYTES # BLD AUTO: 1.54 THOUSANDS/ÂΜL (ref 0.6–4.47)
LYMPHOCYTES NFR BLD AUTO: 23 % (ref 14–44)
MCH RBC QN AUTO: 28.7 PG (ref 26.8–34.3)
MCHC RBC AUTO-ENTMCNC: 31.5 G/DL (ref 31.4–37.4)
MCV RBC AUTO: 91 FL (ref 82–98)
MONOCYTES # BLD AUTO: 0.61 THOUSAND/ÂΜL (ref 0.17–1.22)
MONOCYTES NFR BLD AUTO: 9 % (ref 4–12)
NEUTROPHILS # BLD AUTO: 4.17 THOUSANDS/ÂΜL (ref 1.85–7.62)
NEUTS SEG NFR BLD AUTO: 62 % (ref 43–75)
NITRITE UR QL STRIP: NEGATIVE
NON-SQ EPI CELLS URNS QL MICRO: ABNORMAL /HPF
NRBC BLD AUTO-RTO: 0 /100 WBCS
P AXIS: 37 DEGREES
P AXIS: 8 DEGREES
PH UR STRIP.AUTO: 7.5 [PH] (ref 4.5–8)
PLATELET # BLD AUTO: 237 THOUSANDS/UL (ref 149–390)
PMV BLD AUTO: 10.3 FL (ref 8.9–12.7)
POTASSIUM SERPL-SCNC: 4.3 MMOL/L (ref 3.5–5.3)
PR INTERVAL: 140 MS
PR INTERVAL: 156 MS
PROT SERPL-MCNC: 6.3 G/DL (ref 6.4–8.4)
PROT UR STRIP-MCNC: NEGATIVE MG/DL
QRS AXIS: -44 DEGREES
QRS AXIS: -56 DEGREES
QRSD INTERVAL: 128 MS
QRSD INTERVAL: 130 MS
QT INTERVAL: 440 MS
QT INTERVAL: 450 MS
QTC INTERVAL: 504 MS
QTC INTERVAL: 509 MS
RBC # BLD AUTO: 3.49 MILLION/UL (ref 3.81–5.12)
RBC #/AREA URNS AUTO: ABNORMAL /HPF
SARS-COV+SARS-COV-2 AG RESP QL IA.RAPID: NEGATIVE
SODIUM SERPL-SCNC: 137 MMOL/L (ref 135–147)
SP GR UR STRIP.AUTO: 1.01 (ref 1–1.03)
T WAVE AXIS: 68 DEGREES
T WAVE AXIS: 71 DEGREES
UROBILINOGEN UR QL STRIP.AUTO: 0.2 E.U./DL
VENTRICULAR RATE: 77 BPM
VENTRICULAR RATE: 79 BPM
WBC # BLD AUTO: 6.63 THOUSAND/UL (ref 4.31–10.16)
WBC #/AREA URNS AUTO: ABNORMAL /HPF

## 2024-11-17 PROCEDURE — 82948 REAGENT STRIP/BLOOD GLUCOSE: CPT

## 2024-11-17 PROCEDURE — 36415 COLL VENOUS BLD VENIPUNCTURE: CPT | Performed by: PHYSICIAN ASSISTANT

## 2024-11-17 PROCEDURE — 93005 ELECTROCARDIOGRAM TRACING: CPT

## 2024-11-17 PROCEDURE — 93010 ELECTROCARDIOGRAM REPORT: CPT | Performed by: INTERNAL MEDICINE

## 2024-11-17 PROCEDURE — 87086 URINE CULTURE/COLONY COUNT: CPT

## 2024-11-17 PROCEDURE — 87811 SARS-COV-2 COVID19 W/OPTIC: CPT | Performed by: PHYSICIAN ASSISTANT

## 2024-11-17 PROCEDURE — 87147 CULTURE TYPE IMMUNOLOGIC: CPT

## 2024-11-17 PROCEDURE — 84484 ASSAY OF TROPONIN QUANT: CPT | Performed by: PHYSICIAN ASSISTANT

## 2024-11-17 PROCEDURE — 80053 COMPREHEN METABOLIC PANEL: CPT | Performed by: PHYSICIAN ASSISTANT

## 2024-11-17 PROCEDURE — 84484 ASSAY OF TROPONIN QUANT: CPT | Performed by: STUDENT IN AN ORGANIZED HEALTH CARE EDUCATION/TRAINING PROGRAM

## 2024-11-17 PROCEDURE — 81001 URINALYSIS AUTO W/SCOPE: CPT

## 2024-11-17 PROCEDURE — 96365 THER/PROPH/DIAG IV INF INIT: CPT

## 2024-11-17 PROCEDURE — 99223 1ST HOSP IP/OBS HIGH 75: CPT | Performed by: STUDENT IN AN ORGANIZED HEALTH CARE EDUCATION/TRAINING PROGRAM

## 2024-11-17 PROCEDURE — 85025 COMPLETE CBC W/AUTO DIFF WBC: CPT | Performed by: PHYSICIAN ASSISTANT

## 2024-11-17 PROCEDURE — 87804 INFLUENZA ASSAY W/OPTIC: CPT | Performed by: PHYSICIAN ASSISTANT

## 2024-11-17 PROCEDURE — 99285 EMERGENCY DEPT VISIT HI MDM: CPT

## 2024-11-17 PROCEDURE — 83880 ASSAY OF NATRIURETIC PEPTIDE: CPT | Performed by: PHYSICIAN ASSISTANT

## 2024-11-17 PROCEDURE — 99285 EMERGENCY DEPT VISIT HI MDM: CPT | Performed by: PHYSICIAN ASSISTANT

## 2024-11-17 PROCEDURE — 71045 X-RAY EXAM CHEST 1 VIEW: CPT

## 2024-11-17 RX ORDER — MECLIZINE HCL 12.5 MG 12.5 MG/1
12.5 TABLET ORAL DAILY PRN
Status: DISCONTINUED | OUTPATIENT
Start: 2024-11-17 | End: 2024-11-25

## 2024-11-17 RX ORDER — ATORVASTATIN CALCIUM 40 MG/1
40 TABLET, FILM COATED ORAL
Status: DISCONTINUED | OUTPATIENT
Start: 2024-11-17 | End: 2024-12-02 | Stop reason: HOSPADM

## 2024-11-17 RX ORDER — LATANOPROST 50 UG/ML
1 SOLUTION/ DROPS OPHTHALMIC
Status: DISCONTINUED | OUTPATIENT
Start: 2024-11-17 | End: 2024-12-02 | Stop reason: HOSPADM

## 2024-11-17 RX ORDER — FUROSEMIDE 10 MG/ML
40 INJECTION INTRAMUSCULAR; INTRAVENOUS
Status: DISCONTINUED | OUTPATIENT
Start: 2024-11-17 | End: 2024-11-18

## 2024-11-17 RX ORDER — ALBUTEROL SULFATE 90 UG/1
2 INHALANT RESPIRATORY (INHALATION) EVERY 6 HOURS PRN
Status: DISCONTINUED | OUTPATIENT
Start: 2024-11-17 | End: 2024-12-02 | Stop reason: HOSPADM

## 2024-11-17 RX ORDER — GUAIFENESIN 600 MG/1
600 TABLET, EXTENDED RELEASE ORAL EVERY 12 HOURS SCHEDULED
Status: DISCONTINUED | OUTPATIENT
Start: 2024-11-17 | End: 2024-12-02 | Stop reason: HOSPADM

## 2024-11-17 RX ORDER — METOPROLOL SUCCINATE 50 MG/1
50 TABLET, EXTENDED RELEASE ORAL EVERY 12 HOURS SCHEDULED
Status: DISCONTINUED | OUTPATIENT
Start: 2024-11-17 | End: 2024-11-23

## 2024-11-17 RX ORDER — FERROUS SULFATE 325(65) MG
325 TABLET ORAL
Status: DISCONTINUED | OUTPATIENT
Start: 2024-11-18 | End: 2024-11-23

## 2024-11-17 RX ORDER — OXYCODONE AND ACETAMINOPHEN 5; 325 MG/1; MG/1
1 TABLET ORAL 3 TIMES DAILY PRN
Status: DISCONTINUED | OUTPATIENT
Start: 2024-11-17 | End: 2024-11-18

## 2024-11-17 RX ORDER — ACETAMINOPHEN 325 MG/1
650 TABLET ORAL EVERY 6 HOURS PRN
Status: DISCONTINUED | OUTPATIENT
Start: 2024-11-17 | End: 2024-11-23

## 2024-11-17 RX ORDER — OXYCODONE AND ACETAMINOPHEN 10; 325 MG/1; MG/1
1 TABLET ORAL 3 TIMES DAILY PRN
COMMUNITY
End: 2024-12-02

## 2024-11-17 RX ORDER — PANTOPRAZOLE SODIUM 40 MG/1
40 TABLET, DELAYED RELEASE ORAL
Status: DISCONTINUED | OUTPATIENT
Start: 2024-11-17 | End: 2024-12-02 | Stop reason: HOSPADM

## 2024-11-17 RX ORDER — FUROSEMIDE 10 MG/ML
40 INJECTION INTRAMUSCULAR; INTRAVENOUS ONCE
Status: COMPLETED | OUTPATIENT
Start: 2024-11-17 | End: 2024-11-17

## 2024-11-17 RX ORDER — ENOXAPARIN SODIUM 100 MG/ML
40 INJECTION SUBCUTANEOUS DAILY
Status: DISCONTINUED | OUTPATIENT
Start: 2024-11-17 | End: 2024-11-21

## 2024-11-17 RX ORDER — ASPIRIN 81 MG/1
81 TABLET, CHEWABLE ORAL DAILY
Status: DISCONTINUED | OUTPATIENT
Start: 2024-11-17 | End: 2024-12-02 | Stop reason: HOSPADM

## 2024-11-17 RX ORDER — ACETAMINOPHEN 10 MG/ML
1000 INJECTION, SOLUTION INTRAVENOUS ONCE
Status: COMPLETED | OUTPATIENT
Start: 2024-11-17 | End: 2024-11-17

## 2024-11-17 RX ORDER — BACLOFEN 10 MG/1
10 TABLET ORAL 2 TIMES DAILY
Status: DISCONTINUED | OUTPATIENT
Start: 2024-11-17 | End: 2024-11-23

## 2024-11-17 RX ORDER — INSULIN LISPRO 100 [IU]/ML
1-6 INJECTION, SOLUTION INTRAVENOUS; SUBCUTANEOUS
Status: DISCONTINUED | OUTPATIENT
Start: 2024-11-17 | End: 2024-11-21

## 2024-11-17 RX ORDER — SENNOSIDES 8.6 MG
17.2 TABLET ORAL 2 TIMES DAILY
Status: DISCONTINUED | OUTPATIENT
Start: 2024-11-17 | End: 2024-11-19

## 2024-11-17 RX ORDER — LEVOTHYROXINE SODIUM 75 UG/1
37.5 TABLET ORAL
Status: DISCONTINUED | OUTPATIENT
Start: 2024-11-18 | End: 2024-12-02 | Stop reason: HOSPADM

## 2024-11-17 RX ADMIN — OXYCODONE HYDROCHLORIDE AND ACETAMINOPHEN 1 TABLET: 5; 325 TABLET ORAL at 20:24

## 2024-11-17 RX ADMIN — ATORVASTATIN CALCIUM 40 MG: 40 TABLET, FILM COATED ORAL at 17:14

## 2024-11-17 RX ADMIN — OXYCODONE HYDROCHLORIDE AND ACETAMINOPHEN 1 TABLET: 5; 325 TABLET ORAL at 17:14

## 2024-11-17 RX ADMIN — GUAIFENESIN 600 MG: 600 TABLET, EXTENDED RELEASE ORAL at 20:24

## 2024-11-17 RX ADMIN — FUROSEMIDE 40 MG: 10 INJECTION, SOLUTION INTRAVENOUS at 12:05

## 2024-11-17 RX ADMIN — LATANOPROST 1 DROP: 50 SOLUTION OPHTHALMIC at 21:31

## 2024-11-17 RX ADMIN — ASPIRIN 81 MG CHEWABLE TABLET 81 MG: 81 TABLET CHEWABLE at 15:21

## 2024-11-17 RX ADMIN — BACLOFEN 10 MG: 10 TABLET ORAL at 20:24

## 2024-11-17 RX ADMIN — MORPHINE SULFATE 2 MG: 2 INJECTION, SOLUTION INTRAMUSCULAR; INTRAVENOUS at 12:00

## 2024-11-17 RX ADMIN — PANTOPRAZOLE SODIUM 40 MG: 40 TABLET, DELAYED RELEASE ORAL at 15:24

## 2024-11-17 RX ADMIN — METOPROLOL SUCCINATE 50 MG: 50 TABLET, EXTENDED RELEASE ORAL at 20:24

## 2024-11-17 RX ADMIN — SENNOSIDES 17.2 MG: 8.6 TABLET, FILM COATED ORAL at 17:13

## 2024-11-17 RX ADMIN — METOPROLOL SUCCINATE 50 MG: 50 TABLET, EXTENDED RELEASE ORAL at 15:24

## 2024-11-17 RX ADMIN — ACETAMINOPHEN 1000 MG: 10 INJECTION INTRAVENOUS at 10:29

## 2024-11-17 RX ADMIN — ENOXAPARIN SODIUM 40 MG: 40 INJECTION SUBCUTANEOUS at 15:29

## 2024-11-17 RX ADMIN — FUROSEMIDE 40 MG: 10 INJECTION, SOLUTION INTRAVENOUS at 17:13

## 2024-11-17 NOTE — ED NOTES
Attempted to obtain IV x2 without success. Second RN will attempt.      Olivia Romero, RN  11/17/24 3013

## 2024-11-17 NOTE — ED PROVIDER NOTES
ED Disposition       None          Assessment & Plan       Medical Decision Making  Patient reporting increasing swelling to bilateral lower extremities, hands, abdomen over the past days/weeks.  She has noted to have approximately 31 pound weight gain since her discharge 1 month ago.  At rest she is in no acute distress and remains stable on room air.  EKG with nonspecific intraventricular conduction delay, nonspecific T wave abnormalities, grossly unchanged from prior.  Troponin is within normal limits.  BNP is elevated from previous.  Given significant weight gain plan to admit for IV diuresis.  Patient hemodynamically stable at time admission.    Amount and/or Complexity of Data Reviewed  Labs: ordered.  Radiology: ordered.    Risk  Prescription drug management.             Medications   furosemide (LASIX) injection 40 mg (has no administration in time range)   morphine injection 2 mg (has no administration in time range)   acetaminophen (Ofirmev) injection 1,000 mg (0 mg Intravenous Stopped 11/17/24 1055)       ED Risk Strat Scores                           SBIRT 20yo+      Flowsheet Row Most Recent Value   Initial Alcohol Screen: US AUDIT-C     1. How often do you have a drink containing alcohol? 0 Filed at: 11/17/2024 0903   2. How many drinks containing alcohol do you have on a typical day you are drinking?  0 Filed at: 11/17/2024 0903   3b. FEMALE Any Age, or MALE 65+: How often do you have 4 or more drinks on one occassion? 0 Filed at: 11/17/2024 0903   Audit-C Score 0 Filed at: 11/17/2024 0903   DUNCAN: How many times in the past year have you...    Used an illegal drug or used a prescription medication for non-medical reasons? Never Filed at: 11/17/2024 0903                            History of Present Illness       Chief Complaint   Patient presents with    Edema     Patient arrives via [facility] from [facility] with c/o of generalized edema and SOB. Denies CP.       Past Medical History:   Diagnosis Date     Acid reflux     Acute kidney injury (HCC) 6/18/2022    Anemia     hx of iron-deficient    Anxiety     Arthritis     Asthma     last needed inhaler last year 2020    Basal cell carcinoma     upper lip    Chronic narcotic dependence (HCC)     Chronic pain     Colon polyp     Cystocele     Diabetes mellitus (HCC)     stable    Disease of thyroid gland     hypothyroidism    Diverticulosis     Dizziness     at times    Dysfunctional uterine bleeding     last assessed - [id number]    Dysphagia     Fibromyalgia     Gastric ulcer     Gastroparesis     History of colonic polyps     last assessed - [id number]    History of gastroesophageal reflux (GERD)     Hypercholesterolemia     Hyperlipidemia     Hypertension     Hyponatremia 1/13/2024    IBS (irritable bowel syndrome)     Pneumobilia 06/18/2022    Post laminectomy syndrome     S/P insertion of spinal cord stimulator 07/18/2018    s/p Medtronic loop recorder 3/2/2024 03/02/2024    Seasonal allergies     Shortness of breath     exertional    Spinal stenosis     Status post lumbar spinal fusion 03/16/2018    Stroke (HCC)     pt states slight stroke March 2022      Past Surgical History:   Procedure Laterality Date    APPENDECTOMY      BACK SURGERY      BREAST CYST EXCISION Left     CARDIAC CATHETERIZATION  11/14/2023    Procedure: Cardiac catheterization;  Surgeon: Randolph Holt MD;  [facility];  Service: Cardiology    CARDIAC CATHETERIZATION N/A 11/14/2023    Procedure: Cardiac Coronary Angiogram;  Surgeon: Randolph Holt MD;  [facility];  Service: Cardiology    CARDIAC ELECTROPHYSIOLOGY PROCEDURE N/A 3/2/2024    Procedure: Cardiac loop recorder implant;  Surgeon: Edu Fontaine MD;  [facility];  Service: Cardiology    CHOLECYSTECTOMY      COLONOSCOPY      ESOPHAGOGASTRODUODENOSCOPY N/A 09/28/2016    Procedure: ESOPHAGOGASTRODUODENOSCOPY (EGD);  Surgeon: Mylene Moeller MD;  Location: [facility];  Service:      HERNIA REPAIR      HYSTERECTOMY      TTAH-BSO age 28    LAMINECTOMY      LUMBAR LAMINECTOMY      OOPHORECTOMY      age 28    OH ARTHRODESIS POSTERIOR/PSTLAT TQ 1NTRSPC THORACIC N/A 06/04/2018    Procedure: Reopening of lumbar incision for T12-L5 posterior instrumented fixation and fusion and T12-L4 posterior decompression;  Surgeon: Wood Rogel MD;  Location: [facility];  Service: Neurosurgery    OH COLONOSCOPY FLX DX W/COLLJ SPEC WHEN PFRMD N/A 03/02/2016    Procedure: EGD AND COLONOSCOPY;  Surgeon: Mylene Moeller MD;  [facility]: [facility];  Service: Gastroenterology    OH DILATION ESOPH UNGUIDED SOUND/BOUGIE 1/MULT PASS N/A 09/28/2016    Procedure: DILATATION ESOPHAGEAL;  Surgeon: Mylene Moeller MD;  [facility]: [facility];  Service: Gastroenterology    OH ESOPHAGOGASTRODUODENOSCOPY TRANSORAL DIAGNOSTIC N/A 07/18/2016    Procedure: ESOPHAGOGASTRODUODENOSCOPY (EGD);  Surgeon: Mylene Moeller MD;  [facility];  Service: Gastroenterology    OH INSJ/RPLCMT SPINAL NPG/RCVR POCKET CRTJ&CONNJ Left 06/04/2018    Procedure: removal of left buttock implantable pulse generator and placement of new  implantable pulse generator;  Surgeon: Wood Rogel MD;  [facility];  Service: Neurosurgery      Family History   Problem Relation Age of Onset    Lung cancer Mother 46    Pulmonary embolism Father     No Known Problems Sister     No Known Problems Daughter     No Known Problems Daughter     Stroke Maternal Grandmother     Heart attack Maternal Grandfather     No Known Problems Paternal Grandmother     No Known Problems Paternal Grandfather     No Known Problems Maternal Aunt     Diabetes Family         Diabetes mellitus    Hypertension Family     Stroke Family         Stroke complications      Wt Readings from Last 3 Encounters:   11/17/24 88.4 kg (194 lb 14.2 oz)   10/18/24 74.3 kg (163 lb 12.8 oz)   10/02/24 85.5 kg (188 lb 9.6 oz)       Social History     Tobacco Use    Smoking status: Former     Current  packs/day: 0.00     Types: Cigarettes     Quit date: 1970     Years since quittin.9    Smokeless tobacco: Never    Tobacco comments:     Denied history of current ever day smoker, Former smoker and Never smoker all documented in [employer]   Vaping Use    Vaping status: Never Used   Substance Use Topics    Alcohol use: Not Currently     Comment: Denied history of alcohol use    Drug use: No     Comment: Denied history of drug use      E-Cigarette/Vaping    E-Cigarette Use Never User       E-Cigarette/Vaping Substances    Nicotine No     THC No     CBD No     Flavoring No     Other No     Unknown No       I have reviewed and agree with the history as documented.     Filomena is an 79-year-old female, history of CHF, DM, hypertension, hyperlipidemia, presenting with generalized fluid retention over the past several days/weeks and associated fatigue.  She does feel somewhat short of breath at rest.  She is unsure if she is short of breath with activity as she has not been ambulating due to recent left patellar fracture.  Reports being compliant with medication regimen.  It appears that her dry weight was about 163 pounds at discharge 1 month ago.      History provided by:  Patient   used: No        Review of Systems   Constitutional:  Positive for fatigue. Negative for chills and fever.   HENT:  Negative for congestion, rhinorrhea and sore throat.    Eyes:  Negative for pain and visual disturbance.   Respiratory:  Positive for shortness of breath. Negative for cough and wheezing.    Cardiovascular:  Positive for leg swelling. Negative for chest pain and palpitations.   Gastrointestinal:  Negative for abdominal pain, nausea and vomiting.   Genitourinary:  Negative for dysuria, frequency and urgency.   Musculoskeletal:  Negative for back pain, neck pain and neck stiffness.   Skin:  Negative for rash and wound.   Neurological:  Negative for dizziness, weakness, light-headedness and numbness.            Objective       ED Triage Vitals [11/17/24 0904]   Temperature Pulse Blood Pressure Respirations SpO2 Patient Position - Orthostatic VS   97.9 °F (36.6 °C) 78 151/92 20 96 % Sitting      Temp Source Heart Rate Source BP Location FiO2 (%) Pain Score    Oral Monitor Right arm -- 10 - Worst Possible Pain      Vitals      Date and Time Temp Pulse SpO2 Resp BP Pain Score FACES Pain Rating User   11/17/24 1121 -- 73 99 % 16 132/61 9 -- NG   11/17/24 0904 97.9 °F (36.6 °C) 78 96 % 20 151/92 10 - Worst Possible Pain -- AMB            Physical Exam  Constitutional:       General: She is not in acute distress.     Appearance: She is well-developed. She is not diaphoretic.   HENT:      Head: Normocephalic and atraumatic.      Right Ear: External ear normal.      Left Ear: External ear normal.   Eyes:      Extraocular Movements:      Right eye: Normal extraocular motion.      Left eye: Normal extraocular motion.      Conjunctiva/sclera: Conjunctivae normal.      Pupils: Pupils are equal, round, and reactive to light.   Cardiovascular:      Rate and Rhythm: Normal rate and regular rhythm.      Comments: Ace wraps in place to bilateral lower legs.  Pitting edema noted to feet and lower thighs adjacent to Ace wraps.  Bilateral edema to hands and forearms noted.  Slight abdominal distention.  No tenderness noted.  Pulmonary:      Effort: Pulmonary effort is normal. No accessory muscle usage or respiratory distress.      Breath sounds: No wheezing or rales.   Abdominal:      General: Abdomen is flat. There is no distension.      Tenderness: There is no abdominal tenderness.   Musculoskeletal:      Cervical back: Normal range of motion. No rigidity.   Skin:     General: Skin is warm and dry.      Capillary Refill: Capillary refill takes less than 2 seconds.      Findings: No erythema or rash.   Neurological:      Mental Status: She is alert and oriented to person, place, and time.      Motor: No abnormal muscle tone.       Coordination: Coordination normal.   Psychiatric:         Behavior: Behavior normal.         Thought Content: Thought content normal.         Judgment: Judgment normal.         Results Reviewed       Procedure Component Value Units Date/Time    HS Troponin I 2hr [[id number]]     Lab Status: No result Specimen: Blood     HS Troponin 0hr (reflex protocol) [[id number]]  (Normal) Collected: 11/17/24 1020    Lab Status: Final result Specimen: Blood from Arm, Right Updated: 11/17/24 1059     hs TnI 0hr 12 ng/L     B-Type Natriuretic Peptide(BNP) [[id number]]  (Abnormal) Collected: 11/17/24 1020    Lab Status: Final result Specimen: Blood from Arm, Right Updated: 11/17/24 1058      pg/mL     Comprehensive metabolic panel [[id number]]  (Abnormal) Collected: 11/17/24 1020    Lab Status: Final result Specimen: Blood from Arm, Right Updated: 11/17/24 1052     Sodium 137 mmol/L      Potassium 4.3 mmol/L      Chloride 98 mmol/L      CO2 32 mmol/L      ANION GAP 7 mmol/L      BUN 24 mg/dL      Creatinine 1.23 mg/dL      Glucose 135 mg/dL      Calcium 9.4 mg/dL      Corrected Calcium 9.9 mg/dL      AST 13 U/L      ALT 8 U/L      Alkaline Phosphatase 101 U/L      Total Protein 6.3 g/dL      Albumin 3.4 g/dL      Total Bilirubin 0.37 mg/dL      eGFR 41 ml/min/1.73sq m     Narrative:      National Kidney Disease Foundation guidelines for Chronic Kidney Disease (CKD):     Stage 1 with normal or high GFR (GFR > 90 mL/min/1.73 square meters)    Stage 2 Mild CKD (GFR = 60-89 mL/min/1.73 square meters)    Stage 3A Moderate CKD (GFR = 45-59 mL/min/1.73 square meters)    Stage 3B Moderate CKD (GFR = 30-44 mL/min/1.73 square meters)    Stage 4 Severe CKD (GFR = 15-29 mL/min/1.73 square meters)    Stage 5 End Stage CKD (GFR <15 mL/min/1.73 square meters)  Note: GFR calculation is accurate only with a steady state creatinine    FLU/COVID Rapid Antigen (30 min. TAT) - Preferred screening test in ED [[id number]]  (Normal) Collected: 11/17/24  0953    Lab Status: Final result Specimen: Nares from Nose Updated: 11/17/24 1045     SARS COV Rapid Antigen Negative     Influenza A Rapid Antigen Negative     Influenza B Rapid Antigen Negative    Narrative:      This test has been performed using the xzoops Heidy 2 FLU+SARS Antigen test under the Emergency Use Authorization (EUA). This test has been validated by the  and verified by the performing laboratory. The Heidy uses lateral flow immunofluorescent sandwich assay to detect SARS-COV, Influenza A and Influenza B Antigen.     The Quidel Heidy 2 SARS Antigen test does not differentiate between SARS-CoV and SARS-CoV-2.     Negative results are presumptive and may be confirmed with a molecular assay, if necessary, for patient management. Negative results do not rule out SARS-CoV-2 or influenza infection and should not be used as the sole basis for treatment or patient management decisions. A negative test result may occur if the level of antigen in a sample is below the limit of detection of this test.     Positive results are indicative of the presence of viral antigens, but do not rule out bacterial infection or co-infection with other viruses.     All test results should be used as an adjunct to clinical observations and other information available to the provider.    FOR PEDIATRIC PATIENTS - copy/paste COVID Guidelines URL to browser: [email or web address]    CBC and differential [[id number]]  (Abnormal) Collected: 11/17/24 1020    Lab Status: Final result Specimen: Blood from Arm, Right Updated: 11/17/24 1033     WBC 6.63 Thousand/uL      RBC 3.49 Million/uL      Hemoglobin 10.0 g/dL      Hematocrit 31.7 %      MCV 91 fL      MCH 28.7 pg      MCHC 31.5 g/dL      RDW 13.9 %      MPV 10.3 fL      Platelets 237 Thousands/uL      nRBC 0 /100 WBCs      Segmented % 62 %      Immature Grans % 1 %      Lymphocytes % 23 %      Monocytes % 9 %       Eosinophils Relative 4 %      Basophils Relative 1 %      Absolute Neutrophils 4.17 Thousands/µL      Absolute Immature Grans 0.03 Thousand/uL      Absolute Lymphocytes 1.54 Thousands/µL      Absolute Monocytes 0.61 Thousand/µL      Eosinophils Absolute 0.24 Thousand/µL      Basophils Absolute 0.04 Thousands/µL     Urine Microscopic [[id number]]  (Abnormal) Collected: 11/17/24 1002    Lab Status: Final result Specimen: Urine, Indwelling Bateman Catheter Updated: 11/17/24 1020     RBC, UA 4-10 /hpf      WBC, UA Innumerable /hpf      Epithelial Cells Occasional /hpf      Bacteria, UA Innumerable /hpf      Amorphous Crystals, UA Occasional    Urine culture [[id number]] Collected: 11/17/24 1002    Lab Status: In process Specimen: Urine, Indwelling Bateman Catheter Updated: 11/17/24 1020    Urine Macroscopic, POC [[id number]]  (Abnormal) Collected: 11/17/24 1002    Lab Status: Final result Specimen: Urine Updated: 11/17/24 1003     Color, UA Yellow     Clarity, UA Cloudy     pH, UA 7.5     Leukocytes, UA Large     Nitrite, UA Negative     Protein, UA Negative mg/dl      Glucose, UA Negative mg/dl      Ketones, UA Negative mg/dl      Urobilinogen, UA 0.2 E.U./dl      Bilirubin, UA Negative     Occult Blood, UA Moderate     Specific Gravity, UA 1.010    Narrative:      CLINITEK RESULT            XR chest 1 view portable    (Results Pending)       ECG 12 Lead Documentation Only    Date/Time: 11/17/2024 9:15 AM    Performed by: Ken Porter PA-C  Authorized by: Ken Porter PA-C    Indications / Diagnosis:  Dyspnea  ECG reviewed by me, the ED Provider: yes    Patient location:  ED  Previous ECG:     Previous ECG:  Compared to current    Similarity:  No change    Comparison to cardiac monitor: Yes    Interpretation:     Interpretation: abnormal    Rate:     ECG rate:  79    ECG rate assessment: normal    Rhythm:     Rhythm: sinus rhythm    Ectopy:     Ectopy: none    QRS:     QRS axis:  Left    QRS intervals:   Wide  Conduction:     Conduction: abnormal      Abnormal conduction: non-specific intraventricular conduction delay    ST segments:     ST segments:  Non-specific  T waves:     T waves: non-specific        ED Medication and Procedure Management   Prior to Admission Medications   Prescriptions Last Dose Informant Patient Reported? Taking?   Blood Glucose Monitoring Suppl (OneTouch Verio Reflect) w/Device KIT  Self No Yes   Sig: Check blood sugars twice daily. Please substitute with appropriate alternative as covered by patient's insurance. Dx: E11.65   MAGNESIUM OXIDE 400 PO  Self Yes Yes   Sig: Take 400 mg by mouth 2 (two) times a day   Milnacipran HCl (Savella) 100 MG TABS  Self No Yes   Sig: Take 1 tablet (100 mg total) by mouth daily   OneTouch Delica Lancets 33G MISC  Self No Yes   Sig: Check blood sugars twice daily. Please substitute with appropriate alternative as covered by patient's insurance. Dx: E11.65   acetaminophen (TYLENOL) 325 mg tablet Not Taking  No No   Sig: Take 3 tablets (975 mg total) by mouth every 8 (eight) hours   Patient not taking: Reported on 11/17/2024   albuterol (PROVENTIL HFA,VENTOLIN HFA) 90 mcg/act inhaler  Self No Yes   Sig: Inhale 2 puffs every 6 (six) hours as needed for wheezing or shortness of breath   aluminum-magnesium hydroxide-simethicone (MAALOX) 9361-9339-702 mg/30 mL suspension Not Taking  No No   Sig: Take 30 mL by mouth every 6 (six) hours as needed for indigestion or heartburn   Patient not taking: Reported on 11/17/2024   aspirin 81 mg chewable tablet  Self Yes Yes   Sig: Chew 81 mg daily   atorvastatin (LIPITOR) 40 mg tablet  Self No Yes   Sig: TAKE ONE TABLET BY MOUTH ONCE DAILY   Patient taking differently: Take 40 mg by mouth daily   baclofen 10 mg tablet  Self, Outside Facility (Specify) Yes Yes   Sig: Take 10 mg by mouth 2 (two) times a day   docusate sodium (COLACE) 100 mg capsule Not Taking Self No No   Sig: Take 1 capsule (100 mg total) by mouth 2 (two)  times a day   Patient not taking: Reported on 11/17/2024   ferrous sulfate 325 (65 Fe) mg tablet  Self No Yes   Sig: Take 1 tablet (325 mg total) by mouth daily with breakfast   gabapentin (NEURONTIN) 100 mg capsule Not Taking  No No   Sig: Take 1 capsule (100 mg total) by mouth daily at bedtime   Patient not taking: Reported on 11/17/2024   glucose blood (OneTouch Verio) test strip  Self No Yes   Sig: Check blood sugars twice daily. Please substitute with appropriate alternative as covered by patient's insurance. Dx: E11.65   latanoprost (XALATAN) 0.005 % ophthalmic solution  Self Yes Yes   Sig: Administer 1 drop to both eyes daily at bedtime   levothyroxine 75 mcg tablet  Self No Yes   Sig: Take 0.5 tablets (37.5 mcg total) by mouth daily in the early morning   lidocaine (LIDODERM) 5 % Not Taking  No No   Sig: Apply 1 patch topically over 12 hours daily Remove & Discard patch within 12 hours or as directed by MD   Patient not taking: Reported on 11/17/2024   meclizine (ANTIVERT) 25 mg tablet  Self, Outside Facility (Specify) No Yes   Sig: Take 1 tablet (25 mg total) by mouth 4 (four) times a day as needed for dizziness   Patient taking differently: Take 12.5 mg by mouth daily as needed for dizziness   metFORMIN (GLUCOPHAGE) 500 mg tablet   No Yes   Sig: Take 1 tablet (500 mg total) by mouth 2 (two) times a day with meals   metoprolol succinate (TOPROL-XL) 50 mg 24 hr tablet  Self No Yes   Sig: Take 1 tablet (50 mg total) by mouth every 12 (twelve) hours   oxyCODONE (ROXICODONE) 10 MG TABS Not Taking  No No   Sig: Take 1 tablet (10 mg total) by mouth every 6 (six) hours as needed for severe pain Max Daily Amount: 40 mg   Patient not taking: Reported on 11/17/2024   oxyCODONE-acetaminophen (PERCOCET)  mg per tablet  Outside Facility (Specify) Yes Yes   Sig: Take 1 tablet by mouth 3 (three) times a day as needed for moderate pain   pantoprazole (PROTONIX) 40 mg tablet  Self No Yes   Sig: Take 1 tablet (40 mg  total) by mouth daily   polyethylene glycol (MIRALAX) 17 g packet Not Taking Self No No   Sig: Take 17 g by mouth daily   Patient not taking: Reported on 11/17/2024   senna (SENOKOT) 8.6 mg  Self, Outside Facility (Specify) No Yes   Sig: Take 2 tablets (17.2 mg total) by mouth daily at bedtime   Patient taking differently: Take 17.2 mg by mouth daily as needed for constipation   sucralfate (CARAFATE) 1 g tablet   No No   Sig: Take 1 tablet (1 g total) by mouth 2 (two) times a day before meals for 7 days   temazepam (RESTORIL) 15 mg capsule Not Taking  No No   Sig: Take 1 capsule (15 mg total) by mouth daily at bedtime as needed for sleep   Patient not taking: Reported on 11/17/2024   torsemide (DEMADEX) 10 mg tablet   No Yes   Sig: Take 3 tablets (30 mg total) by mouth daily      Facility-Administered Medications: None     Patient's Medications   Discharge Prescriptions    No medications on file     No discharge procedures on file.  ED SEPSIS DOCUMENTATION            Ken Porter PA-C  11/17/24 9301

## 2024-11-17 NOTE — PLAN OF CARE
Problem: Potential for Falls  Goal: Patient will remain free of falls  Description: INTERVENTIONS:  - Educate patient/family on patient safety including physical limitations  - Instruct patient to call for assistance with activity   - Consult OT/PT to assist with strengthening/mobility   - Keep Call bell within reach  - Keep bed low and locked with side rails adjusted as appropriate  - Keep care items and personal belongings within reach  - Initiate and maintain comfort rounds  - Make Fall Risk Sign visible to staff  - Offer Toileting every  Hours, in advance of need  - Initiate/Maintain alarm  - Obtain necessary fall risk management equipment:   - Apply yellow socks and bracelet for high fall risk patients  - Consider moving patient to room near nurses station  Outcome: Progressing     Problem: PAIN - ADULT  Goal: Verbalizes/displays adequate comfort level or baseline comfort level  Description: Interventions:  - Encourage patient to monitor pain and request assistance  - Assess pain using appropriate pain scale  - Administer analgesics based on type and severity of pain and evaluate response  - Implement non-pharmacological measures as appropriate and evaluate response  - Consider cultural and social influences on pain and pain management  - Notify physician/advanced practitioner if interventions unsuccessful or patient reports new pain  Outcome: Progressing     Problem: SAFETY ADULT  Goal: Patient will remain free of falls  Description: INTERVENTIONS:  - Educate patient/family on patient safety including physical limitations  - Instruct patient to call for assistance with activity   - Consult OT/PT to assist with strengthening/mobility   - Keep Call bell within reach  - Keep bed low and locked with side rails adjusted as appropriate  - Keep care items and personal belongings within reach  - Initiate and maintain comfort rounds  - Make Fall Risk Sign visible to staff  - Offer Toileting every  Hours, in advance  of need  - Initiate/Maintain alarm  - Obtain necessary fall risk management equipment:   - Apply yellow socks and bracelet for high fall risk patients  - Consider moving patient to room near nurses station  Outcome: Progressing  Goal: Maintain or return to baseline ADL function  Description: INTERVENTIONS:  -  Assess patient's ability to carry out ADLs; assess patient's baseline for ADL function and identify physical deficits which impact ability to perform ADLs (bathing, care of mouth/teeth, toileting, grooming, dressing, etc.)  - Assess/evaluate cause of self-care deficits   - Assess range of motion  - Assess patient's mobility; develop plan if impaired  - Assess patient's need for assistive devices and provide as appropriate  - Encourage maximum independence but intervene and supervise when necessary  - Involve family in performance of ADLs  - Assess for home care needs following discharge   - Consider OT consult to assist with ADL evaluation and planning for discharge  - Provide patient education as appropriate  Outcome: Progressing  Goal: Maintains/Returns to pre admission functional level  Description: INTERVENTIONS:  - Perform AM-PAC 6 Click Basic Mobility/ Daily Activity assessment daily.  - Set and communicate daily mobility goal to care team and patient/family/caregiver.   - Collaborate with rehabilitation services on mobility goals if consulted  - Perform Range of Motion  times a day.  - Reposition patient every  hours.  - Dangle patient  times a day  - Stand patient  times a day  - Ambulate patient  times a day  - Out of bed to chair  times a day   - Out of bed for meals  times a day  - Out of bed for toileting  - Record patient progress and toleration of activity level   Outcome: Progressing     Problem: Knowledge Deficit  Goal: Patient/family/caregiver demonstrates understanding of disease process, treatment plan, medications, and discharge instructions  Description: Complete learning assessment and  assess knowledge base.  Interventions:  - Provide teaching at level of understanding  - Provide teaching via preferred learning methods  Outcome: Progressing     Problem: DISCHARGE PLANNING  Goal: Discharge to home or other facility with appropriate resources  Description: INTERVENTIONS:  - Identify barriers to discharge w/patient and caregiver  - Arrange for needed discharge resources and transportation as appropriate  - Identify discharge learning needs (meds, wound care, etc.)  - Arrange for interpretive services to assist at discharge as needed  - Refer to Case Management Department for coordinating discharge planning if the patient needs post-hospital services based on physician/advanced practitioner order or complex needs related to functional status, cognitive ability, or social support system  Outcome: Progressing     Problem: CARDIOVASCULAR - ADULT  Goal: Maintains optimal cardiac output and hemodynamic stability  Description: INTERVENTIONS:  - Monitor I/O, vital signs and rhythm  - Monitor for S/S and trends of decreased cardiac output  - Administer and titrate ordered vasoactive medications to optimize hemodynamic stability  - Assess quality of pulses, skin color and temperature  - Assess for signs of decreased coronary artery perfusion  - Instruct patient to report change in severity of symptoms  Outcome: Progressing  Goal: Absence of cardiac dysrhythmias or at baseline rhythm  Description: INTERVENTIONS:  - Continuous cardiac monitoring, vital signs, obtain 12 lead EKG if ordered  - Administer antiarrhythmic and heart rate control medications as ordered  - Monitor electrolytes and administer replacement therapy as ordered  Outcome: Progressing     Problem: RESPIRATORY - ADULT  Goal: Achieves optimal ventilation and oxygenation  Description: INTERVENTIONS:  - Assess for changes in respiratory status  - Assess for changes in mentation and behavior  - Position to facilitate oxygenation and minimize  respiratory effort  - Oxygen administered by appropriate delivery if ordered  - Initiate smoking cessation education as indicated  - Encourage broncho-pulmonary hygiene including cough, deep breathe, Incentive Spirometry  - Assess the need for suctioning and aspirate as needed  - Assess and instruct to report SOB or any respiratory difficulty  - Respiratory Therapy support as indicated  Outcome: Progressing

## 2024-11-17 NOTE — ASSESSMENT & PLAN NOTE
Had overactive bladder in the past and received Botox. Continues to have urinary retention requiring ahn catheter placement. For outpatient voiding trial.

## 2024-11-17 NOTE — ASSESSMENT & PLAN NOTE
Bilateral, suffered from a fall. 11/15 imaging reviewed showing smooth linear lucencies coursing through the superior lateral aspect of the patella. 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  Will have ortho evaluate and defer need for further imaging  Immobilizer in place  PT/OT

## 2024-11-17 NOTE — ASSESSMENT & PLAN NOTE
Wt Readings from Last 3 Encounters:   11/17/24 88.4 kg (194 lb 14.2 oz)   10/18/24 74.3 kg (163 lb 12.8 oz)   10/02/24 85.5 kg (188 lb 9.6 oz)     79 year old female presented with shortness of breath, lower extremity edema, volume overload, and weight gain secondary to acute on chronic diastolic congestive heart failure.  Takes torsemide 30mg daily, lasix 40mg IV BID  Daily weights, I/os  Continue Metoprolol  Cardiology evaluation  Telemetry

## 2024-11-17 NOTE — ASSESSMENT & PLAN NOTE
"Lab Results   Component Value Date    HGBA1C 7.4 (H) 10/12/2024       No results for input(s): \"POCGLU\" in the last 72 hours.    Blood Sugar Average: Last 72 hrs:    Hold Metformin, sliding scale while inpatient    "

## 2024-11-17 NOTE — H&P
"H&P - Hospitalist   Name: Mattie Varela 79 y.o. female I MRN: [id number]  Unit/Bed#: ED-05 I Date of Admission: 11/17/2024   Date of Service: 11/17/2024 I Hospital Day: 0     Assessment & Plan  Acute on chronic diastolic congestive heart failure (HCC)  Wt Readings from Last 3 Encounters:   11/17/24 88.4 kg (194 lb 14.2 oz)   10/18/24 74.3 kg (163 lb 12.8 oz)   10/02/24 85.5 kg (188 lb 9.6 oz)     79 year old female presented with shortness of breath, lower extremity edema, volume overload, and weight gain secondary to acute on chronic diastolic congestive heart failure.  Takes torsemide 30mg daily, lasix 40mg IV BID  Daily weights, I/os  Continue Metoprolol  Cardiology evaluation  Telemetry    Type 2 diabetes mellitus with other skin complications ([facility])  Lab Results   Component Value Date    HGBA1C 7.4 (H) 10/12/2024       No results for input(s): \"POCGLU\" in the last 72 hours.    Blood Sugar Average: Last 72 hrs:    Hold Metformin, sliding scale while inpatient    Chronic pain disorder  Tylenol, PRN oxycodone-tylenol  Hypertension  Continue Metoprolol  Hypothyroidism  Continue levothyroxine  Knee pain  Bilateral, suffered from a fall. 11/15 imaging reviewed showing smooth linear lucencies coursing through the superior lateral aspect of the patella. 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  Will have ortho evaluate and defer need for further imaging  Immobilizer in place  PT/OT  Urinary retention  Had overactive bladder in the past and received Botox. Continues to have urinary retention requiring ahn catheter placement. For outpatient voiding trial.      VTE Pharmacologic Prophylaxis:   Moderate Risk (Score 3-4) - Pharmacological DVT Prophylaxis Ordered: enoxaparin (Lovenox).  Code Status: Prior full code  Discussion with family: " daughter    Anticipated Length of Stay: Patient will be admitted on an inpatient basis with an anticipated length of stay of greater than 2 midnights secondary to iv diuretics, cards evaluation.    History of Present Illness   Chief Complaint: shortness of breath    Mattie Varela is a 79 y.o. female with a PMH of CHF, hypothyroidism, hypertension, and type 2 diabetes mellitus who presents with shortness of breath.    Patient was recently discharged last 10/30/2024 to the rehab facility after being managed as a case of Klebsiella cystitis.  Patient unfortunately suffered from a fall at the facility and imaging studies were performed.  X-ray results as written above, she has yet to see Ortho for follow-up outpatient and continues to remain in an immobilizer.  Since her discharge back from the rehab to the assisted living facility last Friday, it appears that patient was not able to take her medications as a result of inventory issues at her facility.  She continued to develop shortness of breath, lower extremity edema, and weight gain all suggestive of acute on chronic CHF.  Admission was requested after she presented in our ED for further diuretics and cardiology evaluation.    Review of Systems   Constitutional:  Negative for fatigue and fever.   Respiratory:  Positive for cough and shortness of breath. Negative for wheezing.    Cardiovascular:  Positive for leg swelling. Negative for chest pain and palpitations.   Gastrointestinal:  Negative for abdominal pain, diarrhea, nausea and vomiting.   Skin:  Negative for color change and pallor.   Neurological:  Negative for weakness and headaches.   Psychiatric/Behavioral:  Negative for confusion.        Historical Information   Past Medical History:   Diagnosis Date    Acid reflux     Acute kidney injury (HCC) 6/18/2022    Anemia     hx of iron-deficient    Anxiety     Arthritis     Asthma     last needed inhaler last year 2020    Basal cell carcinoma     upper lip     Chronic narcotic dependence (HCC)     Chronic pain     Colon polyp     Cystocele     Diabetes mellitus (HCC)     stable    Disease of thyroid gland     hypothyroidism    Diverticulosis     Dizziness     at times    Dysfunctional uterine bleeding     last assessed - [id number]    Dysphagia     Fibromyalgia     Gastric ulcer     Gastroparesis     History of colonic polyps     last assessed - [id number]    History of gastroesophageal reflux (GERD)     Hypercholesterolemia     Hyperlipidemia     Hypertension     Hyponatremia 1/13/2024    IBS (irritable bowel syndrome)     Pneumobilia 06/18/2022    Post laminectomy syndrome     S/P insertion of spinal cord stimulator 07/18/2018    s/p [employer] loop recorder 3/2/2024 03/02/2024    Seasonal allergies     Shortness of breath     exertional    Spinal stenosis     Status post lumbar spinal fusion 03/16/2018    Stroke (HCC)     pt states slight stroke March 2022     Past Surgical History:   Procedure Laterality Date    APPENDECTOMY      BACK SURGERY      BREAST CYST EXCISION Left     CARDIAC CATHETERIZATION  11/14/2023    Procedure: Cardiac catheterization;  Surgeon: Randolph Holt MD;  [facility];  Service: Cardiology    CARDIAC CATHETERIZATION N/A 11/14/2023    Procedure: Cardiac Coronary Angiogram;  Surgeon: Randolph Holt MD;  [facility];  Service: Cardiology    CARDIAC ELECTROPHYSIOLOGY PROCEDURE N/A 3/2/2024    Procedure: Cardiac loop recorder implant;  Surgeon: Edu Fontaine MD;  [facility];  Service: Cardiology    CHOLECYSTECTOMY      COLONOSCOPY      ESOPHAGOGASTRODUODENOSCOPY N/A 09/28/2016    Procedure: ESOPHAGOGASTRODUODENOSCOPY (EGD);  Surgeon: Mylene Moeller MD;  [facility];  Service:     HERNIA REPAIR      HYSTERECTOMY      TTAH-BSO age 28    LAMINECTOMY      LUMBAR LAMINECTOMY      OOPHORECTOMY      age 28    RI ARTHRODESIS POSTERIOR/PSTLAT TQ 1NTRSPC THORACIC N/A 06/04/2018    Procedure:  Reopening of lumbar incision for T12-L5 posterior instrumented fixation and fusion and T12-L4 posterior decompression;  Surgeon: Wood Rogel MD;  [facility];  Service: Neurosurgery    KY COLONOSCOPY FLX DX W/COLLJ SPEC WHEN PFRMD N/A 2016    Procedure: EGD AND COLONOSCOPY;  Surgeon: Mylene Moeller MD;  [facility];  Service: Gastroenterology    KY DILATION ESOPH UNGUIDED SOUND/BOUGIE 1/MULT PASS N/A 2016    Procedure: DILATATION ESOPHAGEAL;  Surgeon: Mylene Moeller MD;  [facility];  Service: Gastroenterology    KY ESOPHAGOGASTRODUODENOSCOPY TRANSORAL DIAGNOSTIC N/A 2016    Procedure: ESOPHAGOGASTRODUODENOSCOPY (EGD);  Surgeon: Mylene Moeller MD;  [facility];  Service: Gastroenterology    KY INSJ/RPLCMT SPINAL NPG/RCVR POCKET CRTJ&CONNJ Left 2018    Procedure: removal of left buttock implantable pulse generator and placement of new  implantable pulse generator;  Surgeon: Wood Rogel MD;  [facility];  Service: Neurosurgery     Social History     Tobacco Use    Smoking status: Former     Current packs/day: 0.00     Types: Cigarettes     Quit date: 1970     Years since quittin.9    Smokeless tobacco: Never    Tobacco comments:     Denied history of current ever day smoker, Former smoker and Never smoker all documented in [employer]   Vaping Use    Vaping status: Never Used   Substance and Sexual Activity    Alcohol use: Not Currently     Comment: Denied history of alcohol use    Drug use: No     Comment: Denied history of drug use    Sexual activity: Not Currently     E-Cigarette/Vaping    E-Cigarette Use Never User      E-Cigarette/Vaping Substances    Nicotine No     THC No     CBD No     Flavoring No     Other No     Unknown No      Family History   Problem Relation Age of Onset    Lung cancer Mother 46    Pulmonary embolism Father     No Known Problems Sister     No Known Problems Daughter     No Known Problems Daughter     Stroke  Maternal Grandmother     Heart attack Maternal Grandfather     No Known Problems Paternal Grandmother     No Known Problems Paternal Grandfather     No Known Problems Maternal Aunt     Diabetes Family         Diabetes mellitus    Hypertension Family     Stroke Family         Stroke complications     Social History:  Marital Status:      Meds/Allergies   I have reviewed home medications with patient personally.  Prior to Admission medications    Medication Sig Start Date End Date Taking? Authorizing Provider   albuterol (PROVENTIL HFA,VENTOLIN HFA) 90 mcg/act inhaler Inhale 2 puffs every 6 (six) hours as needed for wheezing or shortness of breath 6/29/22  Yes NANY Pollock   aspirin 81 mg chewable tablet Chew 81 mg daily   Yes Historical Provider, MD   atorvastatin (LIPITOR) 40 mg tablet TAKE ONE TABLET BY MOUTH ONCE DAILY  Patient taking differently: Take 40 mg by mouth daily 11/22/22  Yes NANY Pollock   baclofen 10 mg tablet Take 10 mg by mouth 2 (two) times a day   Yes Historical Provider, MD   Blood Glucose Monitoring Suppl (OneTouch Verio Reflect) w/Device KIT Check blood sugars twice daily. Please substitute with appropriate alternative as covered by patient's insurance. Dx: E11.65 2/20/23  Yes NANY Pollock   ferrous sulfate 325 (65 Fe) mg tablet Take 1 tablet (325 mg total) by mouth daily with breakfast 1/10/24  Yes Paco Boyer MD   glucose blood (OneTouch Verio) test strip Check blood sugars twice daily. Please substitute with appropriate alternative as covered by patient's insurance. Dx: E11.65 2/20/23  Yes NANY Pollock   latanoprost (XALATAN) 0.005 % ophthalmic solution Administer 1 drop to both eyes daily at bedtime   Yes Historical Provider, MD   levothyroxine 75 mcg tablet Take 0.5 tablets (37.5 mcg total) by mouth daily in the early morning 1/17/24 11/17/24 Yes Alejandro Philip MD   MAGNESIUM OXIDE 400 PO Take 400 mg by mouth 2 (two) times a day   Yes  Historical Provider, MD   meclizine (ANTIVERT) 25 mg tablet Take 1 tablet (25 mg total) by mouth 4 (four) times a day as needed for dizziness  Patient taking differently: Take 12.5 mg by mouth daily as needed for dizziness 3/26/23 11/17/24 Yes Venice Baires,    metFORMIN (GLUCOPHAGE) 500 mg tablet Take 1 tablet (500 mg total) by mouth 2 (two) times a day with meals 10/19/24  Yes Paco Carmona MD   metoprolol succinate (TOPROL-XL) 50 mg 24 hr tablet Take 1 tablet (50 mg total) by mouth every 12 (twelve) hours 11/15/23  Yes Gustabo Lara MD   Milnacipran HCl (Savella) 100 MG TABS Take 1 tablet (100 mg total) by mouth daily 3/5/23  Yes Venice Baires DO   OneTouch Delica Lancets 33G MISC Check blood sugars twice daily. Please substitute with appropriate alternative as covered by patient's insurance. Dx: E11.65 2/20/23  Yes NANY Pollock   oxyCODONE-acetaminophen (PERCOCET)  mg per tablet Take 1 tablet by mouth 3 (three) times a day as needed for moderate pain   Yes Historical Provider, MD   pantoprazole (PROTONIX) 40 mg tablet Take 1 tablet (40 mg total) by mouth daily 1/10/24 11/17/24 Yes Paco Boyer MD   senna (SENOKOT) 8.6 mg Take 2 tablets (17.2 mg total) by mouth daily at bedtime  Patient taking differently: Take 17.2 mg by mouth daily as needed for constipation 6/5/24  Yes NANY Ruby   torsemide (DEMADEX) 10 mg tablet Take 3 tablets (30 mg total) by mouth daily 10/31/24  Yes May Alvarado DO   acetaminophen (TYLENOL) 325 mg tablet Take 3 tablets (975 mg total) by mouth every 8 (eight) hours  Patient not taking: Reported on 11/17/2024 10/19/24   Paco Carmona MD   aluminum-magnesium hydroxide-simethicone (MAALOX) 4233-0034-410 mg/30 mL suspension Take 30 mL by mouth every 6 (six) hours as needed for indigestion or heartburn  Patient not taking: Reported on 11/17/2024 10/19/24   Paco Carmona MD   docusate sodium (COLACE) 100 mg capsule Take 1 capsule  (100 mg total) by mouth 2 (two) times a day  Patient not taking: Reported on 11/17/2024 6/5/24   NANY Ruby   gabapentin (NEURONTIN) 100 mg capsule Take 1 capsule (100 mg total) by mouth daily at bedtime  Patient not taking: Reported on 11/17/2024 10/30/24   May Alvarado DO   lidocaine (LIDODERM) 5 % Apply 1 patch topically over 12 hours daily Remove & Discard patch within 12 hours or as directed by MD  Patient not taking: Reported on 11/17/2024 10/20/24   Paco Carmona MD   oxyCODONE (ROXICODONE) 10 MG TABS Take 1 tablet (10 mg total) by mouth every 6 (six) hours as needed for severe pain Max Daily Amount: 40 mg  Patient not taking: Reported on 11/17/2024 10/30/24   May Alvarado DO   polyethylene glycol (MIRALAX) 17 g packet Take 17 g by mouth daily  Patient not taking: Reported on 11/17/2024 6/5/24   NANY Ruby   sucralfate (CARAFATE) 1 g tablet Take 1 tablet (1 g total) by mouth 2 (two) times a day before meals for 7 days 10/19/24 10/26/24  Paco Carmona MD   temazepam (RESTORIL) 15 mg capsule Take 1 capsule (15 mg total) by mouth daily at bedtime as needed for sleep  Patient not taking: Reported on 11/17/2024 10/30/24   May Alvarado DO     Allergies   Allergen Reactions    Penicillins Anaphylaxis, Hives and Other (See Comments)     Other reaction(s): Unknown Reaction    Sulfa Antibiotics Anaphylaxis and Other (See Comments)     Other reaction(s): Unknown Reaction    Aspartame - Food Allergy Other (See Comments) and Hypertension     Slurred speech, weakness, stroke sx    Iodinated Contrast Media Hives     Pt has taken prep prior for contrast and has not had any break through reaction    Keflex [Cephalexin] Hives       Objective :  Temp:  [97.9 °F (36.6 °C)] 97.9 °F (36.6 °C)  HR:  [73-78] 78  BP: (132-162)/(61-92) 162/73  Resp:  [16-20] 20  SpO2:  [96 %-99 %] 98 %  O2 Device: None (Room air)    Physical Exam  Vitals reviewed.   Constitutional:       General: She  is not in acute distress.  HENT:      Head: Normocephalic.      Nose: Nose normal.      Mouth/Throat:      Mouth: Mucous membranes are moist.   Eyes:      General: No scleral icterus.  Cardiovascular:      Rate and Rhythm: Normal rate and regular rhythm.   Pulmonary:      Effort: Pulmonary effort is normal. No respiratory distress.      Breath sounds: No wheezing or rales.   Abdominal:      General: There is no distension.      Palpations: Abdomen is soft.      Tenderness: There is no abdominal tenderness.   Genitourinary:     Comments: Bateman in place  Musculoskeletal:      Right lower leg: Edema present.      Left lower leg: Edema present.      Comments: Knee immobilizer   Neurological:      Mental Status: She is alert and oriented to person, place, and time.   Psychiatric:         Mood and Affect: Mood normal.         Behavior: Behavior normal.       Lines/Drains:  Lines/Drains/Airways       Active Status       Name Placement date Placement time Site Days    Urethral Catheter 16 Fr. 10/26/24  1311  --  22                  Urinary Catheter:  Goal for removal: Voiding trial when ambulation improves               Lab Results: I have reviewed the following results:  Results from last 7 days   Lab Units 11/17/24  1020   WBC Thousand/uL 6.63   HEMOGLOBIN g/dL 10.0*   HEMATOCRIT % 31.7*   PLATELETS Thousands/uL 237   SEGS PCT % 62   LYMPHO PCT % 23   MONO PCT % 9   EOS PCT % 4     Results from last 7 days   Lab Units 11/17/24  1020   SODIUM mmol/L 137   POTASSIUM mmol/L 4.3   CHLORIDE mmol/L 98   CO2 mmol/L 32   BUN mg/dL 24   CREATININE mg/dL 1.23   ANION GAP mmol/L 7   CALCIUM mg/dL 9.4   ALBUMIN g/dL 3.4*   TOTAL BILIRUBIN mg/dL 0.37   ALK PHOS U/L 101   ALT U/L 8   AST U/L 13   GLUCOSE RANDOM mg/dL 135             Lab Results   Component Value Date    HGBA1C 7.4 (H) 10/12/2024    HGBA1C 6.5 (H) 11/26/2023    HGBA1C 6.6 (H) 06/09/2023           Xr chest: Reviewed recent CT scans two days ago  Ecg pending    ** Please  Note: This note has been constructed using a voice recognition system. **

## 2024-11-18 ENCOUNTER — RESULTS FOLLOW-UP (OUTPATIENT)
Dept: GASTROENTEROLOGY | Facility: CLINIC | Age: 79
End: 2024-11-18

## 2024-11-18 ENCOUNTER — APPOINTMENT (INPATIENT)
Dept: CT IMAGING | Facility: HOSPITAL | Age: 79
DRG: 562 | End: 2024-11-18
Payer: MEDICARE

## 2024-11-18 PROBLEM — S82.009A PATELLA FRACTURE: Status: ACTIVE | Noted: 2024-11-17

## 2024-11-18 LAB
ALBUMIN SERPL BCG-MCNC: 3 G/DL (ref 3.5–5)
ALP SERPL-CCNC: 82 U/L (ref 34–104)
ALT SERPL W P-5'-P-CCNC: 5 U/L (ref 7–52)
ANION GAP SERPL CALCULATED.3IONS-SCNC: 8 MMOL/L (ref 4–13)
AST SERPL W P-5'-P-CCNC: 11 U/L (ref 13–39)
ATRIAL RATE: 85 BPM
BASOPHILS # BLD AUTO: 0.04 THOUSANDS/ÂΜL (ref 0–0.1)
BASOPHILS NFR BLD AUTO: 1 % (ref 0–1)
BILIRUB SERPL-MCNC: 0.29 MG/DL (ref 0.2–1)
BUN SERPL-MCNC: 28 MG/DL (ref 5–25)
CALCIUM ALBUM COR SERPL-MCNC: 9.6 MG/DL (ref 8.3–10.1)
CALCIUM SERPL-MCNC: 8.8 MG/DL (ref 8.4–10.2)
CHLORIDE SERPL-SCNC: 100 MMOL/L (ref 96–108)
CO2 SERPL-SCNC: 30 MMOL/L (ref 21–32)
CREAT SERPL-MCNC: 1.33 MG/DL (ref 0.6–1.3)
EOSINOPHIL # BLD AUTO: 0.2 THOUSAND/ÂΜL (ref 0–0.61)
EOSINOPHIL NFR BLD AUTO: 4 % (ref 0–6)
ERYTHROCYTE [DISTWIDTH] IN BLOOD BY AUTOMATED COUNT: 14 % (ref 11.6–15.1)
GFR SERPL CREATININE-BSD FRML MDRD: 37 ML/MIN/1.73SQ M
GLUCOSE SERPL-MCNC: 138 MG/DL (ref 65–140)
GLUCOSE SERPL-MCNC: 141 MG/DL (ref 65–140)
GLUCOSE SERPL-MCNC: 158 MG/DL (ref 65–140)
GLUCOSE SERPL-MCNC: 236 MG/DL (ref 65–140)
GLUCOSE SERPL-MCNC: 267 MG/DL (ref 65–140)
HCT VFR BLD AUTO: 29.3 % (ref 34.8–46.1)
HGB BLD-MCNC: 9.2 G/DL (ref 11.5–15.4)
IMM GRANULOCYTES # BLD AUTO: 0.04 THOUSAND/UL (ref 0–0.2)
IMM GRANULOCYTES NFR BLD AUTO: 1 % (ref 0–2)
LYMPHOCYTES # BLD AUTO: 1.63 THOUSANDS/ÂΜL (ref 0.6–4.47)
LYMPHOCYTES NFR BLD AUTO: 28 % (ref 14–44)
MAGNESIUM SERPL-MCNC: 1.6 MG/DL (ref 1.9–2.7)
MCH RBC QN AUTO: 28.5 PG (ref 26.8–34.3)
MCHC RBC AUTO-ENTMCNC: 31.4 G/DL (ref 31.4–37.4)
MCV RBC AUTO: 91 FL (ref 82–98)
MONOCYTES # BLD AUTO: 0.65 THOUSAND/ÂΜL (ref 0.17–1.22)
MONOCYTES NFR BLD AUTO: 11 % (ref 4–12)
NEUTROPHILS # BLD AUTO: 3.18 THOUSANDS/ÂΜL (ref 1.85–7.62)
NEUTS SEG NFR BLD AUTO: 55 % (ref 43–75)
NRBC BLD AUTO-RTO: 0 /100 WBCS
P AXIS: 14 DEGREES
PLATELET # BLD AUTO: 235 THOUSANDS/UL (ref 149–390)
PMV BLD AUTO: 10.5 FL (ref 8.9–12.7)
POTASSIUM SERPL-SCNC: 4.2 MMOL/L (ref 3.5–5.3)
PR INTERVAL: 142 MS
PROT SERPL-MCNC: 5.6 G/DL (ref 6.4–8.4)
QRS AXIS: -54 DEGREES
QRSD INTERVAL: 126 MS
QT INTERVAL: 426 MS
QTC INTERVAL: 506 MS
RBC # BLD AUTO: 3.23 MILLION/UL (ref 3.81–5.12)
SODIUM SERPL-SCNC: 138 MMOL/L (ref 135–147)
T WAVE AXIS: 77 DEGREES
VENTRICULAR RATE: 85 BPM
WBC # BLD AUTO: 5.74 THOUSAND/UL (ref 4.31–10.16)

## 2024-11-18 PROCEDURE — 82948 REAGENT STRIP/BLOOD GLUCOSE: CPT

## 2024-11-18 PROCEDURE — 85025 COMPLETE CBC W/AUTO DIFF WBC: CPT | Performed by: STUDENT IN AN ORGANIZED HEALTH CARE EDUCATION/TRAINING PROGRAM

## 2024-11-18 PROCEDURE — 83735 ASSAY OF MAGNESIUM: CPT | Performed by: STUDENT IN AN ORGANIZED HEALTH CARE EDUCATION/TRAINING PROGRAM

## 2024-11-18 PROCEDURE — 99222 1ST HOSP IP/OBS MODERATE 55: CPT | Performed by: STUDENT IN AN ORGANIZED HEALTH CARE EDUCATION/TRAINING PROGRAM

## 2024-11-18 PROCEDURE — 87081 CULTURE SCREEN ONLY: CPT | Performed by: STUDENT IN AN ORGANIZED HEALTH CARE EDUCATION/TRAINING PROGRAM

## 2024-11-18 PROCEDURE — 93010 ELECTROCARDIOGRAM REPORT: CPT | Performed by: INTERNAL MEDICINE

## 2024-11-18 PROCEDURE — 73700 CT LOWER EXTREMITY W/O DYE: CPT

## 2024-11-18 PROCEDURE — 80053 COMPREHEN METABOLIC PANEL: CPT | Performed by: STUDENT IN AN ORGANIZED HEALTH CARE EDUCATION/TRAINING PROGRAM

## 2024-11-18 PROCEDURE — 99223 1ST HOSP IP/OBS HIGH 75: CPT | Performed by: INTERNAL MEDICINE

## 2024-11-18 PROCEDURE — 27520 TREAT KNEECAP FRACTURE: CPT | Performed by: STUDENT IN AN ORGANIZED HEALTH CARE EDUCATION/TRAINING PROGRAM

## 2024-11-18 PROCEDURE — 99232 SBSQ HOSP IP/OBS MODERATE 35: CPT

## 2024-11-18 RX ORDER — SUCRALFATE 1 G/1
1 TABLET ORAL
Status: DISCONTINUED | OUTPATIENT
Start: 2024-11-18 | End: 2024-12-02 | Stop reason: HOSPADM

## 2024-11-18 RX ORDER — OXYCODONE HYDROCHLORIDE 5 MG/1
5 TABLET ORAL EVERY 6 HOURS PRN
Refills: 0 | Status: DISCONTINUED | OUTPATIENT
Start: 2024-11-18 | End: 2024-11-25

## 2024-11-18 RX ORDER — MAGNESIUM SULFATE HEPTAHYDRATE 40 MG/ML
2 INJECTION, SOLUTION INTRAVENOUS ONCE
Status: COMPLETED | OUTPATIENT
Start: 2024-11-18 | End: 2024-11-18

## 2024-11-18 RX ORDER — LIDOCAINE 50 MG/G
1 PATCH TOPICAL DAILY
Status: DISCONTINUED | OUTPATIENT
Start: 2024-11-19 | End: 2024-12-02 | Stop reason: HOSPADM

## 2024-11-18 RX ORDER — MAGNESIUM HYDROXIDE/ALUMINUM HYDROXICE/SIMETHICONE 120; 1200; 1200 MG/30ML; MG/30ML; MG/30ML
30 SUSPENSION ORAL EVERY 4 HOURS PRN
Status: DISCONTINUED | OUTPATIENT
Start: 2024-11-18 | End: 2024-11-25

## 2024-11-18 RX ORDER — OXYCODONE HYDROCHLORIDE 10 MG/1
10 TABLET ORAL EVERY 6 HOURS PRN
Refills: 0 | Status: DISCONTINUED | OUTPATIENT
Start: 2024-11-18 | End: 2024-11-25

## 2024-11-18 RX ORDER — GABAPENTIN 100 MG/1
100 CAPSULE ORAL
Status: DISCONTINUED | OUTPATIENT
Start: 2024-11-18 | End: 2024-11-25

## 2024-11-18 RX ORDER — FUROSEMIDE 10 MG/ML
60 INJECTION INTRAMUSCULAR; INTRAVENOUS
Status: COMPLETED | OUTPATIENT
Start: 2024-11-18 | End: 2024-11-20

## 2024-11-18 RX ORDER — FUROSEMIDE 10 MG/ML
75 INJECTION INTRAMUSCULAR; INTRAVENOUS
Status: DISCONTINUED | OUTPATIENT
Start: 2024-11-18 | End: 2024-11-18

## 2024-11-18 RX ADMIN — GUAIFENESIN 600 MG: 600 TABLET, EXTENDED RELEASE ORAL at 20:45

## 2024-11-18 RX ADMIN — PANTOPRAZOLE SODIUM 40 MG: 40 TABLET, DELAYED RELEASE ORAL at 05:19

## 2024-11-18 RX ADMIN — LEVOTHYROXINE SODIUM 37.5 MCG: 75 TABLET ORAL at 05:19

## 2024-11-18 RX ADMIN — INSULIN LISPRO 3 UNITS: 100 INJECTION, SOLUTION INTRAVENOUS; SUBCUTANEOUS at 17:21

## 2024-11-18 RX ADMIN — FERROUS SULFATE TAB 325 MG (65 MG ELEMENTAL FE) 325 MG: 325 (65 FE) TAB at 10:05

## 2024-11-18 RX ADMIN — BACLOFEN 10 MG: 10 TABLET ORAL at 20:45

## 2024-11-18 RX ADMIN — METOPROLOL SUCCINATE 50 MG: 50 TABLET, EXTENDED RELEASE ORAL at 20:44

## 2024-11-18 RX ADMIN — ALUMINUM HYDROXIDE, MAGNESIUM HYDROXIDE, AND DIMETHICONE 30 ML: 200; 20; 200 SUSPENSION ORAL at 12:30

## 2024-11-18 RX ADMIN — SUCRALFATE 1 G: 1 TABLET ORAL at 17:19

## 2024-11-18 RX ADMIN — ASPIRIN 81 MG CHEWABLE TABLET 81 MG: 81 TABLET CHEWABLE at 10:05

## 2024-11-18 RX ADMIN — BACLOFEN 10 MG: 10 TABLET ORAL at 10:05

## 2024-11-18 RX ADMIN — INSULIN LISPRO 3 UNITS: 100 INJECTION, SOLUTION INTRAVENOUS; SUBCUTANEOUS at 11:52

## 2024-11-18 RX ADMIN — OXYCODONE HYDROCHLORIDE AND ACETAMINOPHEN 1 TABLET: 5; 325 TABLET ORAL at 10:09

## 2024-11-18 RX ADMIN — OXYCODONE HYDROCHLORIDE 10 MG: 10 TABLET ORAL at 20:41

## 2024-11-18 RX ADMIN — MELATONIN 3 MG: 3 TAB ORAL at 01:18

## 2024-11-18 RX ADMIN — LATANOPROST 1 DROP: 50 SOLUTION OPHTHALMIC at 21:05

## 2024-11-18 RX ADMIN — OXYCODONE HYDROCHLORIDE 10 MG: 10 TABLET ORAL at 12:30

## 2024-11-18 RX ADMIN — FUROSEMIDE 40 MG: 10 INJECTION, SOLUTION INTRAVENOUS at 10:05

## 2024-11-18 RX ADMIN — ENOXAPARIN SODIUM 40 MG: 40 INJECTION SUBCUTANEOUS at 10:05

## 2024-11-18 RX ADMIN — MAGNESIUM SULFATE HEPTAHYDRATE 2 G: 40 INJECTION, SOLUTION INTRAVENOUS at 10:06

## 2024-11-18 RX ADMIN — GUAIFENESIN 600 MG: 600 TABLET, EXTENDED RELEASE ORAL at 10:05

## 2024-11-18 RX ADMIN — SENNOSIDES 17.2 MG: 8.6 TABLET, FILM COATED ORAL at 17:19

## 2024-11-18 RX ADMIN — SENNOSIDES 17.2 MG: 8.6 TABLET, FILM COATED ORAL at 10:05

## 2024-11-18 RX ADMIN — OXYCODONE HYDROCHLORIDE AND ACETAMINOPHEN 1 TABLET: 5; 325 TABLET ORAL at 03:48

## 2024-11-18 RX ADMIN — FUROSEMIDE 60 MG: 10 INJECTION, SOLUTION INTRAVENOUS at 17:19

## 2024-11-18 RX ADMIN — METOPROLOL SUCCINATE 50 MG: 50 TABLET, EXTENDED RELEASE ORAL at 10:05

## 2024-11-18 RX ADMIN — GABAPENTIN 100 MG: 100 CAPSULE ORAL at 21:05

## 2024-11-18 RX ADMIN — ATORVASTATIN CALCIUM 40 MG: 40 TABLET, FILM COATED ORAL at 17:19

## 2024-11-18 NOTE — PHYSICAL THERAPY NOTE
PHYSICAL THERAPY NOTE          Patient Name: Mattie Varela  Today's Date: 11/18/2024 11/18/24 1035   PT Last Visit   PT Visit Date 11/18/24   Note Type   Note type Cancelled Session   Cancel Reasons Refusal   Additional Comments PT consult received. Chart reviewed. Attempted PT eval however pt declined 2* to nausea & vomiting. Will continue to follow as appropriate.     Cosme Estrada

## 2024-11-18 NOTE — CONSULTS
Consultation - Orthopedics   Name: Mattie Varela 79 y.o. female I MRN: [id number]  Unit/Bed#: E4 -01 I Date of Admission: 11/17/2024   Date of Service: 11/18/2024 I Hospital Day: 1   Inpatient consult to Orthopedic Surgery  Consult performed by: Marly Davis PA-C  Consult ordered by: Paco Carmona MD        Physician Requesting Evaluation: Perlita Torres MD   Reason for Evaluation / Principal Problem: Right knee pain, rule out patellar fracture    Assessment & Plan  Knee pain  - CT of the Left knee reviewed today showing what appears to be a fracture of her bipartite patella  - Continue with KI.  - WBAT LLE with knee immobilizer on  - PT/OT  - Pain control PRN  - Medical management per primary  - Follow up outpatient in 2 weeks with Dr. Velasco in the office with repeat xrays.  - Ortho will sign off.  Acute on chronic diastolic congestive heart failure (HCC)  Wt Readings from Last 3 Encounters:   11/18/24 85 kg (187 lb 6.3 oz)   10/18/24 74.3 kg (163 lb 12.8 oz)   10/02/24 85.5 kg (188 lb 9.6 oz)     -Management per primary      Type 2 diabetes mellitus with other skin complications ([facility])  Lab Results   Component Value Date    HGBA1C 7.4 (H) 10/12/2024       Recent Labs     11/17/24  1633 11/17/24  2112 11/18/24  0740   POCGLU 113 200* 138       Blood Sugar Average: Last 72 hrs:  (P) [id number]    -Management per primary    Stage 3a chronic kidney disease (HCC)  Lab Results   Component Value Date    EGFR 37 11/18/2024    EGFR 41 11/17/2024    EGFR 37 10/30/2024    CREATININE 1.33 (H) 11/18/2024    CREATININE 1.23 11/17/2024    CREATININE 1.33 (H) 10/30/2024     Orthopedics service will follow.  Please contact the SecureChat role for the Orthopedics service with any questions/concerns.      History of Present Illness   HPI: Mattie Varela is a 79 y.o. year old female who presents with Left knee pain following a fall this past Thursday.  She states that she fell directly onto her  bilateral knees, noticing an increase in pain in the anterior aspect of the left knee. She had xrays performed on 11/15 showing the fracture and she was placed in a knee immobilizer.  She then returned to the ER on 11/17 with symptoms of congestive heart failure and she was admitted to the hospital for further management.  Orthopedics was consulted for further recommendations on her patella fracture.  Today she states that she continues with pain in the anterior aspect of the knee.  She has been using her KI, but takes it off for sleeping.  She denies any changes to her pain or symptoms.  No distal numbness or tingling.    Review of Systems significant for findings described in the HPI.  I have reviewed the patient's PMH, PSH, Social History, Family History, Meds, and Allergies    Objective :  Temp:  [96.5 °F (35.8 °C)-98.5 °F (36.9 °C)] 97.1 °F (36.2 °C)  HR:  [73-96] 80  BP: (115-162)/(55-75) 141/75  Resp:  [16-25] 18  SpO2:  [93 %-99 %] 94 %  O2 Device: None (Room air)  Physical Exam  Constitutional:       Appearance: Normal appearance.   HENT:      Head: Normocephalic.   Pulmonary:      Effort: Pulmonary effort is normal.   Musculoskeletal:      Left knee: No effusion.   Neurological:      General: No focal deficit present.      Mental Status: She is alert.   Psychiatric:         Mood and Affect: Mood normal.     Left Knee Exam     Tenderness   The patient is experiencing tenderness in the patella.    Other   Erythema: absent  Sensation: normal  Pulse: present  Swelling: none  Effusion: no effusion present    Comments:  Small area of ecchymosis over anterior patella  Did not perform ROM secondary to possible fracture of patella  Able to perform SLR.              Lab Results: I have reviewed the following results:   Recent Labs     11/17/24  1020 11/18/24  0526   WBC 6.63 5.74   HGB 10.0* 9.2*   HCT 31.7* 29.3*    235   BUN 24 28*   CREATININE 1.23 1.33*     Blood Culture:   Lab Results   Component Value  "Date    BLOODCX No Growth After 5 Days. 01/13/2024     Wound Culture: No results found for: \"WOUNDCULT\"    Imaging Results Review: I personally reviewed the following image studies in PACS and associated radiology reports: CT Left lower extremity and xray(s). My interpretation of the radiology images/reports is: CT LLE: Impression: Fracture of the patella and hemarthrosis.  No other fractures identified.  Xray left knee:  Fracture patella vs bipartite patella, CT scan ordered.  Other Study Results Review: No additional pertinent studies reviewed.      Marly Davis PA-C    "

## 2024-11-18 NOTE — UTILIZATION REVIEW
Initial Clinical Review    Admission: Date/Time/Statement:   Admission Orders (From admission, onward)       Ordered        11/17/24 1157  INPATIENT ADMISSION  Once                          Orders Placed This Encounter   Procedures    INPATIENT ADMISSION     Standing Status:   Standing     Number of Occurrences:   1     Level of Care:   Med Surg [16]     Estimated length of stay:   More than 2 Midnights     Certification:   I certify that inpatient services are medically necessary for this patient for a duration of greater than two midnights. See H&P and MD Progress Notes for additional information about the patient's course of treatment.     ED Arrival Information       Expected   -    Arrival   11/17/2024 08:59    Acuity   Urgent              Means of arrival   Ambulance    Escorted by   [facility] ([facility])    Service   Hospitalist    Admission type   Emergency              Arrival complaint   -             Chief Complaint   Patient presents with    Edema     Patient arrives via [facility] from [facility] with c/o of generalized edema and SOB. Denies CP.       Initial Presentation: 79 y.o. female with a PMH of CHF, hypothyroidism, chronic pain, HTN and DM2 who presents to the ED from assisted living facility with shortness of breath.  Patient was recently discharged 10/30/2024 to a rehab facility after being managed as a case of Klebsiella cystitis.  Patient unfortunately suffered a fall at the facility with left patellar fracture.   She has yet to see Ortho for follow-up outpatient and continues to remain in an immobilizer.  Since her discharge from rehab back  the assisted living facility last Friday, it appears that patient was not able to take her medications as a result of inventory issues at her facility.  Takes Torsemide 30 mg daily.  She developed shortness of breath, lower extremity edema, and weight gain. Patient has urinary catheter due to history of urinary retention, plan for OP voiding trial.   ED  labs revealed elevated BNP of 265. Patient received IV Lasix in the ED. On exam, AOX3. B/L LE edema.  Ahn in place. Knee immobilizer.    11/17 Inpatient admission for evaluation and treatment of acute on chronic diastolic CHF, knee pain:  Lasix 40 mg IV BID, I/O, daily weight, telemetry, continue metoprolol, consult Cardiology.  Consult Orthopedics, PT/OT eval.  Anticipated Length of Stay: Patient will be admitted on an inpatient basis with an anticipated length of stay of greater than 2 midnights secondary to iv diuretics, Cardiology evaluation.    11/18 Cardiology consult:  Acute on chronic diastolic CHF:  Labs show elevated BNP and stable kidney function. Magnseiusm 1.6 this AM, given IV magnesium. She is diuresing well on current diuretic regimen with - 2.4 L of urine output. 7 lbs bed scale weight loss since admission. Continue with lasix 40 mg BID. Continue to monitor kidney function and electrolytes closely with a goal of K > 4 and Mg > 2. Continue ASA 81 mg daily, atorvastatin 40 mg daily, and Toprol-XL 50 mg BID. Exam:  Normal breath sounds. B/L LE edema. Orthopedics consult:  On x-ray and CAT scan findings consistent with fracture of the lateral facet of her patella. WBAT LLE with knee immobilizer for two weeks,  PT/OT, pain control PRN, follow up outpatient in two weeks. Internal Medicine:  Patient complaining of both knee and back pain. Reports productive cough. Shortness of breath improved today.  UA >100,000 cfu GNR. Was recently treated for Klebsiella cystitis. Without symptoms, fever or leukocytosis & chronic ahn, monitor off abx. On as needed Percocet at home, continue oxycodone 5 mg PRN for moderate pain and 10 mg for severe pain. Baseline creatinine around 1.3, monitor closely with diuresis.       11/19 Day 3: Has surpassed a 2nd midnight with active treatments and services. Cardiology: Patient states her breathing is improved compared to yesterday. Reports frequent diarrhea.  She continues to  diuresis well on current dose of IV diuretics. BP and HR remain stable. Labs show Na 137, K 3.9, Cr 1.23 and GFR 41. Down 3 lbs (bed scale) since yesterday. -1.250 L of urine output. Plan:  Continue IV lasix 60 mg BID. Price checked jardiance $760--case management to inquire about insurance to see if she is covered. Continue to monitor kidney and electrolyte function with goal of K > 4 and Mg > 2. Continue ASA 81 mg daily, atorvastatin 40 mg daily, and Toprol-Xl 50 mg daily.  C.diff PCR pending.           ED Treatment-Medication Administration from 11/17/2024 0859 to 11/17/2024 1618         Date/Time Order Dose Route Action     11/17/2024 1029 acetaminophen (Ofirmev) injection 1,000 mg 1,000 mg Intravenous New Bag     11/17/2024 1205 furosemide (LASIX) injection 40 mg 40 mg Intravenous Given     11/17/2024 1200 morphine injection 2 mg 2 mg Intravenous Given     11/17/2024 1521 aspirin chewable tablet 81 mg 81 mg Oral Given     11/17/2024 1524 metoprolol succinate (TOPROL-XL) 24 hr tablet 50 mg 50 mg Oral Given     11/17/2024 1524 pantoprazole (PROTONIX) EC tablet 40 mg 40 mg Oral Given     11/17/2024 1529 enoxaparin (LOVENOX) subcutaneous injection 40 mg 40 mg Subcutaneous Given            Scheduled Medications:  aspirin, 81 mg, Oral, Daily  atorvastatin, 40 mg, Oral, Daily With Dinner  baclofen, 10 mg, Oral, BID  enoxaparin, 40 mg, Subcutaneous, Daily  ferrous sulfate, 325 mg, Oral, Daily With Breakfast  guaiFENesin, 600 mg, Oral, Q12H ARY  insulin lispro, 1-6 Units, Subcutaneous, TID AC  latanoprost, 1 drop, Both Eyes, HS  levothyroxine, 37.5 mcg, Oral, Early Morning  metoprolol succinate, 50 mg, Oral, Q12H ARY  Milnacipran HCl, 1 tablet, Oral, Daily  pantoprazole, 40 mg, Oral, Early Morning  senna, 17.2 mg, Oral, BID    furosemide (LASIX) injection 60 mg  Dose: 60 mg  Freq: 2 times daily (diuretic) Route: IV  Start: 11/18/24 1600    furosemide (LASIX) injection 40 mg x 2 doses  Dose: 40 mg  Freq: 2 times daily  (diuretic) Route: IV  Start: 11/17/24 1600 End: 11/18/24 1353         magnesium sulfate 2 g/50 mL IVPB (premix) 2 g  Dose: 2 g  Freq: Once Route: IV  Last Dose: 2 g (11/18/24 1006)  Start: 11/18/24 0815         Continuous IV Infusions:     PRN Meds:  acetaminophen, 650 mg, Oral, Q6H PRN  albuterol, 2 puff, Inhalation, Q6H PRN  meclizine, 12.5 mg, Oral, Daily PRN  melatonin, 3 mg, Oral, HS PRN  oxyCODONE-acetaminophen, 1 tablet, Oral, TID PRN x 2 doses 11/17, x 2 doses 11/18 then dc'd    oxyCODONE (ROXICODONE) immediate release tablet 10 mg x 2 doses 11/18  Dose: 10 mg  Freq: Every 6 hours PRN Route: PO  PRN Reason: severe pain  Start: 11/18/24 1137    oxyCODONE (ROXICODONE) IR tablet 5 mg x 1 dose 11/19 thus far  Dose: 5 mg  Freq: Every 6 hours PRN Route: PO  PRN Reason: moderate pain  Start: 11/18/24 1137      ED Triage Vitals [11/17/24 0904]   Temperature Pulse Respirations Blood Pressure SpO2 Pain Score   97.9 °F (36.6 °C) 78 20 151/92 96 % 10 - Worst Possible Pain     Weight (last 2 days)       Date/Time Weight    11/19/24 0546 83.9 (184.97)    11/18/24 0554 85 (187.39)    11/17/24 0904 88.4 (194.89)            Vital Signs (last 3 days)      Date/Time Temp Pulse Resp BP MAP (mmHg) SpO2 O2 Device Patient Position - Orthostatic VS Loki Coma Scale Score Pain   11/19/24 1107 98.5 °F (36.9 °C) 96 18 126/65 88 95 % None (Room air) Lying -- --   11/19/24 0931 -- -- -- -- -- -- -- -- -- 6   11/19/24 0744 -- -- -- -- -- -- -- -- 15 No Pain   11/19/24 0737 98.1 °F (36.7 °C) 91 18 115/55 73 94 % None (Room air) Lying -- --   11/19/24 0307 97.5 °F (36.4 °C) 84 18 120/57 71 94 % None (Room air) Lying -- --   11/18/24 2310 98.6 °F (37 °C) 93 18 120/62 83 91 % None (Room air) Lying -- --   11/18/24 2145 -- -- -- -- -- -- -- -- 15 --   11/18/24 2044 -- 89 -- 119/63 -- -- -- -- -- --   11/18/24 2041 -- -- -- -- -- -- -- -- -- 10 - Worst Possible Pain   11/18/24 1900 97.5 °F (36.4 °C) 89 18 133/71 95 98 % None (Room air) Lying  -- --   11/18/24 1531 -- 68 18 103/67 88 94 % None (Room air) Lying -- --   11/18/24 1230 -- -- -- -- -- -- -- -- -- 9   11/18/24 1129 97.9 °F (36.6 °C) 86 18 104/51 57 97 % None (Room air) Lying -- --   11/18/24 1009 -- -- -- -- -- -- -- -- -- 9   11/18/24 0813 -- -- -- -- -- -- -- -- 15 No Pain   11/18/24 0739 -- 80 18 141/75 95 94 % None (Room air) Lying -- --   11/18/24 0348 -- -- -- -- -- -- -- -- -- 10 - Worst Possible Pain   11/18/24 0300 97.1 °F (36.2 °C) 78 18 127/60 87 93 % None (Room air) Lying -- --   11/17/24 2228 97.6 °F (36.4 °C) 82 18 115/55 79 94 % None (Room air) Lying -- --   11/17/24 2130 -- -- -- -- -- -- -- -- 15 --   11/17/24 2024 -- -- -- -- -- -- -- -- -- 9   11/17/24 1922 98.5 °F (36.9 °C) 96 18 130/60 86 94 % None (Room air) Lying -- --   11/17/24 1714 -- -- -- -- -- -- -- -- -- 8   11/17/24 1700 -- -- -- -- -- -- -- -- 15 --   11/17/24 1624 96.5 °F (35.8 °C) 79 20 159/67 96 98 % None (Room air) Lying -- --   11/17/24 1530 -- 85 25 125/59 85 93 % -- -- -- 9   11/17/24 1524 -- 82 -- 125/59 -- -- -- -- -- --   11/17/24 1518 -- 82 18 125/59 -- 96 % None (Room air) Sitting -- --   11/17/24 1400 -- 82 18 137/60 86 -- -- Sitting -- --   11/17/24 1208 -- 78 20 162/73 -- 98 % None (Room air) Sitting -- --   11/17/24 1200 -- -- -- -- -- -- -- -- -- 9   11/17/24 1121 -- 73 16 132/61 -- 99 % None (Room air) Lying -- 9   11/17/24 0953 -- -- -- -- -- -- None (Room air) -- 15 --   11/17/24 0904 97.9 °F (36.6 °C) 78 20 151/92 -- 96 % None (Room air) Sitting -- 10 - Worst Possible Pain              Pertinent Labs/Diagnostic Test Results:       Radiology:      CT lower extremity wo contrast left   Final Interpretation by Samy Diaz MD (11/18 1230)      Acute nondisplaced fracture of the bipartite patella.      This report is concordant with JASVIR TOSCANO's preliminary interpretation that is documented in the electronic medical record (EPIC).      Resident: JÚNIOR Rockwell I, the attending  radiologist, have reviewed the images and agree with the final report above.      Workstation performed: [id number]         XR chest 1 view portable   Final Interpretation by Humberto Ashley MD (11/18 0443)      No acute cardiopulmonary disease.            Workstation performed: [id number]           Cardiology:    11/17 EKG #3:    Normal sinus rhythm  Left axis deviation  Non-specific intra-ventricular conduction block  Abnormal ECG  When compared with ECG of 17-Nov-2024 12:25,  No significant change was found  Confirmed by Paco Boyer ([id number]) on 11/18/2024 4:54:55 AM       11/17 EKG #2:    Normal sinus rhythm  Left axis deviation  Non-specific intra-ventricular conduction block  Abnormal ECG  When compared with ECG of 17-Nov-2024 09:07, (unconfirmed)  Nonspecific T wave abnormality no longer evident in Inferior leads      11/17 EKG:    Sinus rhythm with Premature ventricular complexes  Left axis deviation  Non-specific intra-ventricular conduction block  Abnormal ECG  When compared with ECG of 11-Oct-2024 14:53,  Aberrant conduction is now Present  ST elevation now present in Inferior leads  Nonspecific T wave abnormality now evident in Inferior leads  T wave inversion no longer evident in Lateral leads        Results from last 7 days   Lab Units 11/19/24  0551 11/18/24  0526 11/17/24  1020   WBC Thousand/uL 9.02 5.74 6.63   HEMOGLOBIN g/dL 10.7* 9.2* 10.0*   HEMATOCRIT % 33.6* 29.3* 31.7*   PLATELETS Thousands/uL 253 235 237   TOTAL NEUT ABS Thousands/µL 5.78 3.18 4.17         Results from last 7 days   Lab Units 11/19/24  0551 11/18/24  0526 11/17/24  1020   SODIUM mmol/L 137 138 137   POTASSIUM mmol/L 3.9 4.2 4.3   CHLORIDE mmol/L 99 100 98   CO2 mmol/L 29 30 32   ANION GAP mmol/L 9 8 7   BUN mg/dL 22 28* 24   CREATININE mg/dL 1.23 1.33* 1.23   EGFR ml/min/1.73sq m 41 37 41   CALCIUM mg/dL 9.1 8.8 9.4   MAGNESIUM mg/dL 2.0 1.6*  --      Results from last 7 days   Lab Units 11/18/24  0526 11/17/24  1020    AST U/L 11* 13   ALT U/L 5* 8   ALK PHOS U/L 82 101   TOTAL PROTEIN g/dL 5.6* 6.3*   ALBUMIN g/dL 3.0* 3.4*   TOTAL BILIRUBIN mg/dL 0.29 0.37     Results from last 7 days   Lab Units 11/19/24  1109 11/19/24  0740 11/18/24  2100 11/18/24  1631 11/18/24  1129 11/18/24  0740 11/17/24  2112 11/17/24  1633   POC GLUCOSE mg/dl 183* 149* 158* 236* 267* 138 200* 113     Results from last 7 days   Lab Units 11/19/24  0551 11/18/24  0526 11/17/24  1020   GLUCOSE RANDOM mg/dL 150* 141* 135           Results from last 7 days   Lab Units 11/17/24  1429 11/17/24  1235 11/17/24  1020   HS TNI 0HR ng/L  --   --  12   HS TNI 2HR ng/L  --  13  --    HSTNI D2 ng/L  --  1  --    HS TNI 4HR ng/L 12  --   --    HSTNI D4 ng/L 0  --   --                Results from last 7 days   Lab Units 11/17/24  1020   BNP pg/mL 265*               Results from last 7 days   Lab Units 11/17/24  1002   CLARITY UA  Cloudy   COLOR UA  Yellow   SPEC GRAV UA  1.010   PH UA  7.5   GLUCOSE UA mg/dl Negative   KETONES UA mg/dl Negative   BLOOD UA  Moderate*   PROTEIN UA mg/dl Negative   NITRITE UA  Negative   BILIRUBIN UA  Negative   UROBILINOGEN UA E.U./dl 0.2   LEUKOCYTES UA  Large*   WBC UA /hpf Innumerable*   RBC UA /hpf 4-10*   BACTERIA UA /hpf Innumerable*   EPITHELIAL CELLS WET PREP /hpf Occasional                     Results from last 7 days   Lab Units 11/17/24  1002   URINE CULTURE  >100,000 cfu/ml                   Past Medical History:   Diagnosis Date    Acid reflux     Acute kidney injury (HCC) 6/18/2022    Anemia     hx of iron-deficient    Anxiety     Arthritis     Asthma     last needed inhaler last year 2020    Basal cell carcinoma     upper lip    Chronic narcotic dependence (HCC)     Chronic pain     Colon polyp     Cystocele     Diabetes mellitus (HCC)     stable    Disease of thyroid gland     hypothyroidism    Diverticulosis     Dizziness     at times    Dysfunctional uterine bleeding     last assessed - [id number]    Dysphagia      Fibromyalgia     Gastric ulcer     Gastroparesis     History of colonic polyps     last assessed - [id number]    History of gastroesophageal reflux (GERD)     Hypercholesterolemia     Hyperlipidemia     Hypertension     Hyponatremia 1/13/2024    IBS (irritable bowel syndrome)     Pneumobilia 06/18/2022    Post laminectomy syndrome     S/P insertion of spinal cord stimulator 07/18/2018    s/p [employer] loop recorder 3/2/2024 03/02/2024    Seasonal allergies     Shortness of breath     exertional    Spinal stenosis     Status post lumbar spinal fusion 03/16/2018    Stroke (HCC)     pt states slight stroke March 2022     Present on Admission:   Type 2 diabetes mellitus with other skin complications (HCC)   Hypertension   Chronic pain disorder   Hypothyroidism   Acute on chronic diastolic congestive heart failure (HCC)   Urinary retention   Stage 3a chronic kidney disease (HCC)      Admitting Diagnosis: Swelling [R60.9]  Knee pain [M25.569]  Fatigue [R53.83]  Dyspnea [R06.00]  CHF exacerbation (HCC) [I50.9]  Age/Sex: 79 y.o. female        Network Utilization Review Department  ATTENTION: Please call with any questions or concerns to [phone] and carefully listen to the prompts so that you are directed to the right person. All voicemails are confidential.   For Discharge needs, contact Care Management DC Support Team at [phone] opt. 2  Send all requests for admission clinical reviews, approved or denied determinations and any other requests to dedicated fax number below belonging to the campus where the patient is receiving treatment. List of dedicated fax numbers for the Facilities:  FACILITY NAME UR FAX NUMBER   ADMISSION DENIALS (Administrative/Medical Necessity) [phone]   DISCHARGE SUPPORT TEAM (NETWORK) [phone]   PARENT CHILD HEALTH (Maternity/NICU/Pediatrics) [phone]   [facility] [phone]   [facility] [phone]   [facility]  [facility] [phone]   [facility] [phone]   Critical access hospital [phone]   [facility] [phone]   [facility] [phone]   [facility] [phone]   [facility] [phone]   [facility] [phone]   [facility] [phone]   [facility] [phone]

## 2024-11-18 NOTE — CONSULTS
Consultation - Cardiology   Name: Mattie Varela 79 y.o. female I MRN: [id number]  Unit/Bed#: E4 -01 I Date of Admission: 11/17/2024   Date of Service: 11/18/2024 I Hospital Day: 1   Inpatient consult to Cardiology  Consult performed by: Rukhsana Dasilva PA-C  Consult ordered by: Paco Carmona MD      Physician Requesting Evaluation: Perlita Torres MD   Reason for Evaluation / Principal Problem: Acute CHF exacerbation     Assessment & Plan  Acute on chronic diastolic congestive heart failure (HCC)   - TTE 05/23/24: LVEF 70%, grade I diastolic dysfunction, mild LVOT obstruction, moderate annular calcification, mild TR   - BNP 11/17/24: 264 (BNP 05/22/24 was 302)  - CXR 11/17/24: no acute cardiopulmonary disease   - about   - Neurohormonal Blockade:   -- beta blocker: Toprol-XL 50 mg BID  -- ACE/ARB/ARNi: none  -- aldosterone antagonist: none   -- SGLT2: none   -- diuretic: torsemide 30 mg daily  -- inpatient diuretic: IV lasix 40 mg BID  Hypertension  - BP elevated on admission with systolic 115-162 mmHg  - home regimen: Toprol-XL 50 mg BID  Type 2 diabetes mellitus with other skin complications ([facility])  - A1c 10/12/24: 7.4  - on metformin   Stroke-like symptoms  - November 2023: CTA demonstrated moderate stenosis of distal right MCA M1, focal moderate stenosis involving the inferior basilar artery with infundibular dilation at the right ICA origin   - s/p loop recorder implant 01/11/24  - on atorvastatin 40 mg daily and ASA 91 mg daily    - loop interrogation 09/17/24: NSR no patient or device activated episodes (Interrogation in March showed 2 episodes of AF)  Stage 3a chronic kidney disease (HCC)  - follows with outpatient nephrology  - baseline creatinine ~1.3-1.4, GFR 37-42  Knee pain  - XR 11/15/24: showing smooth linear lucencies coursing through the superior lateral aspect of the patella, nondisplaced fracture difficult to exclude   - pending CT of lower extremity   - orthopedics  following  Urinary retention  - history of urinary retention with spine stimulator in place   - continues to have urinary retention requiring Bateman catheter   - need outpatient voiding trial  Hypothyroidism  - continue levothyroxine   Chronic pain disorder      Plan  Pt presenting with bilateral extremity edema, weight gain, and SOB. Labs show elevated BNP and stable kidney function. Magnseiusm 1.6 this AM, given IV magnesium. She is diuresing well on current diuretic regimen with - 2.4 L of urine output. 7 lbs bed scale weight loss since admission.  - would continue with lasix 40 mg BID   - strict I/O's and daily weights  - continue to monitor kidney function and electrolytes closely with a goal of K > 4 and Mg > 2  - can consider adding Jardiance to help with diuresis as an outpatient although she may not be agreeable to this as she is worried about her kidney function. Will hold off for now and can consider price check closer to her discharge  - continue ASA 81 mg daily, atorvastatin 40 mg daily, and Toprol-XL 50 mg BID    History Of Present Illness:  Mattie Varela is a 79 year old with PMH of chronic HFpEF, TIIDM, HTN, chronic pain disorder who presented to [facility] ED 11/17/24 complaining of bilateral extremity edema, abdominal bloating, and weight gain since her discharge one month ago. Initial workup in the ED showed negative troponin x 2, elevated BNP, CXR showing no acute cardiopulmonary disease, and EKG showed no ischemic changes. Per chart review, pt's dry weight back in May 2024 was 166 lbs. She is 187 lbs (bed scale) on admission. Prior to admission, she was seen at [facility] ED 11/15/24 following a fall resulting in knee pain and chronic low back pain. CT of her head showed no intracranial abnormality and CT of the spin show no fracture or malalignment.    Pt states she started noticed she was gaining weight and had SOB for at least the past week. She is still involved with [facility] and living at [facility] in [place]. Pt states she had a fall at rehab three days ago after she tripped on a cord going to the bathroom. She walks with a walker. Pt states since 11/14/14 she has not had any of her pills because the pharmacy did not have them. Prior to this, she believes she was taking her water pill on a daily basis. She gets her legs wrapped with ACE bandages to help with edema. Over the last week, she noticed increased abdominal bloating, edema in her legs and hands, and swelling in her face. She states when she took the torsemide she had good urinary output. She noted increased SOB and DUNBAR over the days where she did not have her water pill. No recent illness.     Rhythm History: LAD, non-specific intra-ventricular block    Primary Cardiologist: Dr. Valdes (last seen by me in office 07/24/24)    ROS:  Review of Systems   Constitutional:  Positive for unexpected weight change. Negative for diaphoresis.   Respiratory:  Positive for shortness of breath. Negative for chest tightness and wheezing.    Cardiovascular:  Positive for leg swelling. Negative for chest pain and palpitations.   Genitourinary:  Negative for difficulty urinating.   Musculoskeletal:  Positive for back pain and joint swelling.   Neurological:  Negative for dizziness and light-headedness.        Past Medical History:        Past Medical History:   Diagnosis Date    Acid reflux     Acute kidney injury (HCC) 6/18/2022    Anemia     hx of iron-deficient    Anxiety     Arthritis     Asthma     last needed inhaler last year 2020    Basal cell carcinoma     upper lip    Chronic narcotic dependence (HCC)     Chronic pain     Colon polyp     Cystocele     Diabetes mellitus (HCC)     stable    Disease of thyroid gland     hypothyroidism    Diverticulosis     Dizziness     at times    Dysfunctional uterine bleeding     last assessed - [id number]    Dysphagia     Fibromyalgia     Gastric ulcer     Gastroparesis     History of colonic polyps     last  assessed - [id number]    History of gastroesophageal reflux (GERD)     Hypercholesterolemia     Hyperlipidemia     Hypertension     Hyponatremia 1/13/2024    IBS (irritable bowel syndrome)     Pneumobilia 06/18/2022    Post laminectomy syndrome     S/P insertion of spinal cord stimulator 07/18/2018    s/p [employer] loop recorder 3/2/2024 03/02/2024    Seasonal allergies     Shortness of breath     exertional    Spinal stenosis     Status post lumbar spinal fusion 03/16/2018    Stroke (HCC)     pt states slight stroke March 2022      Past Surgical History:   Procedure Laterality Date    APPENDECTOMY      BACK SURGERY      BREAST CYST EXCISION Left     CARDIAC CATHETERIZATION  11/14/2023    Procedure: Cardiac catheterization;  Surgeon: Randolph Holt MD;  [facility];  Service: Cardiology    CARDIAC CATHETERIZATION N/A 11/14/2023    Procedure: Cardiac Coronary Angiogram;  Surgeon: Randolph Holt MD;  [facility];  Service: Cardiology    CARDIAC ELECTROPHYSIOLOGY PROCEDURE N/A 3/2/2024    Procedure: Cardiac loop recorder implant;  Surgeon: Edu Fontaine MD;  [facility];  Service: Cardiology    CHOLECYSTECTOMY      COLONOSCOPY      ESOPHAGOGASTRODUODENOSCOPY N/A 09/28/2016    Procedure: ESOPHAGOGASTRODUODENOSCOPY (EGD);  Surgeon: Mylene Moeller MD;  Location: [facility];  Service:     HERNIA REPAIR      HYSTERECTOMY      TTAH-BSO age 28    LAMINECTOMY      LUMBAR LAMINECTOMY      OOPHORECTOMY      age 28    NV ARTHRODESIS POSTERIOR/PSTLAT TQ 1NTRSPC THORACIC N/A 06/04/2018    Procedure: Reopening of lumbar incision for T12-L5 posterior instrumented fixation and fusion and T12-L4 posterior decompression;  Surgeon: Wood Rogel MD;  Location: [facility];  Service: Neurosurgery    NV COLONOSCOPY FLX DX W/COLLJ SPEC WHEN PFRMD N/A 03/02/2016    Procedure: EGD AND COLONOSCOPY;  Surgeon: Mylene Moeller MD;  [facility]: [facility];  Service: Gastroenterology    NV  DILATION ESOPH UNGUIDED SOUND/BOUGIE 1/MULT PASS N/A 2016    Procedure: DILATATION ESOPHAGEAL;  Surgeon: Mylene Moeller MD;  Location: [facility];  Service: Gastroenterology    ME ESOPHAGOGASTRODUODENOSCOPY TRANSORAL DIAGNOSTIC N/A 2016    Procedure: ESOPHAGOGASTRODUODENOSCOPY (EGD);  Surgeon: Mylene Moeller MD;  [facility]: [facility];  Service: Gastroenterology    ME INSJ/RPLCMT SPINAL NPG/RCVR POCKET CRTJ&CONNJ Left 2018    Procedure: removal of left buttock implantable pulse generator and placement of new  implantable pulse generator;  Surgeon: Wood Rogel MD;  Location: [facility];  Service: Neurosurgery       Family History:     Family History   Problem Relation Age of Onset    Lung cancer Mother 46    Pulmonary embolism Father     No Known Problems Sister     No Known Problems Daughter     No Known Problems Daughter     Stroke Maternal Grandmother     Heart attack Maternal Grandfather     No Known Problems Paternal Grandmother     No Known Problems Paternal Grandfather     No Known Problems Maternal Aunt     Diabetes Family         Diabetes mellitus    Hypertension Family     Stroke Family         Stroke complications     Social History:       Social History     Socioeconomic History    Marital status:      Spouse name: None    Number of children: None    Years of education: None    Highest education level: None   Occupational History    Occupation: Retired   Tobacco Use    Smoking status: Former     Current packs/day: 0.00     Types: Cigarettes     Quit date: 1970     Years since quittin.9    Smokeless tobacco: Never    Tobacco comments:     Denied history of current ever day smoker, Former smoker and Never smoker all documented in [employer]   Vaping Use    Vaping status: Never Used   Substance and Sexual Activity    Alcohol use: Not Currently     Comment: Denied history of alcohol use    Drug use: No     Comment: Denied history of drug use    Sexual activity: Not Currently    Other Topics Concern    None   Social History Narrative    Marital History - Currently  per [employer]     Social Drivers of Health     Financial Resource Strain: Medium Risk (8/9/2022)    Overall Financial Resource Strain (CARDIA)     Difficulty of Paying Living Expenses: Somewhat hard   Food Insecurity: No Food Insecurity (10/15/2024)    Nursing - Inadequate Food Risk Classification     Worried About Running Out of Food in the Last Year: Never true     Ran Out of Food in the Last Year: Never true     Ran Out of Food in the Last Year: Not on file   Transportation Needs: No Transportation Needs (10/15/2024)    PRAPARE - Transportation     Lack of Transportation (Medical): No     Lack of Transportation (Non-Medical): No   Physical Activity: Not on file   Stress: Not on file   Social Connections: Not on file   Intimate Partner Violence: Not on file   Housing Stability: Low Risk  (10/15/2024)    Housing Stability Vital Sign     Unable to Pay for Housing in the Last Year: No     Number of Times Moved in the Last Year: 0     Homeless in the Last Year: No      Allergy:        Allergies   Allergen Reactions    Penicillins Anaphylaxis, Hives and Other (See Comments)     Other reaction(s): Unknown Reaction    Sulfa Antibiotics Anaphylaxis and Other (See Comments)     Other reaction(s): Unknown Reaction    Aspartame - Food Allergy Other (See Comments) and Hypertension     Slurred speech, weakness, stroke sx    Iodinated Contrast Media Hives     Pt has taken prep prior for contrast and has not had any break through reaction    Keflex [Cephalexin] Hives     Medications:       Prior to Admission medications    Medication Sig Start Date End Date Taking? Authorizing Provider   albuterol (PROVENTIL HFA,VENTOLIN HFA) 90 mcg/act inhaler Inhale 2 puffs every 6 (six) hours as needed for wheezing or shortness of breath 6/29/22  Yes NANY Pollock   aspirin 81 mg chewable tablet Chew 81 mg daily   Yes Historical Provider,  MD   atorvastatin (LIPITOR) 40 mg tablet TAKE ONE TABLET BY MOUTH ONCE DAILY  Patient taking differently: Take 40 mg by mouth daily 11/22/22  Yes NANY Pollock   baclofen 10 mg tablet Take 10 mg by mouth 2 (two) times a day   Yes Historical Provider, MD   Blood Glucose Monitoring Suppl (OneTouch Verio Reflect) w/Device KIT Check blood sugars twice daily. Please substitute with appropriate alternative as covered by patient's insurance. Dx: E11.65 2/20/23  Yes NANY Pollock   ferrous sulfate 325 (65 Fe) mg tablet Take 1 tablet (325 mg total) by mouth daily with breakfast 1/10/24  Yes Paco Boyer MD   glucose blood (OneTouch Verio) test strip Check blood sugars twice daily. Please substitute with appropriate alternative as covered by patient's insurance. Dx: E11.65 2/20/23  Yes NANY Pollock   latanoprost (XALATAN) 0.005 % ophthalmic solution Administer 1 drop to both eyes daily at bedtime   Yes Historical Provider, MD   levothyroxine 75 mcg tablet Take 0.5 tablets (37.5 mcg total) by mouth daily in the early morning 1/17/24 11/17/24 Yes Alejandro Philip MD   MAGNESIUM OXIDE 400 PO Take 400 mg by mouth 2 (two) times a day   Yes Historical Provider, MD   meclizine (ANTIVERT) 25 mg tablet Take 1 tablet (25 mg total) by mouth 4 (four) times a day as needed for dizziness  Patient taking differently: Take 12.5 mg by mouth daily as needed for dizziness 3/26/23 11/17/24 Yes Venice Baires, DO   metFORMIN (GLUCOPHAGE) 500 mg tablet Take 1 tablet (500 mg total) by mouth 2 (two) times a day with meals 10/19/24  Yes Paco Carmona MD   metoprolol succinate (TOPROL-XL) 50 mg 24 hr tablet Take 1 tablet (50 mg total) by mouth every 12 (twelve) hours 11/15/23  Yes Gustabo Lara MD   Milnacipran HCl (Savella) 100 MG TABS Take 1 tablet (100 mg total) by mouth daily 3/5/23  Yes Venice Baires, DO   OneTouch Delica Lancets 33G MISC Check blood sugars twice daily. Please substitute with  appropriate alternative as covered by patient's insurance. Dx: E11.65 2/20/23  Yes NANY Pollock   oxyCODONE-acetaminophen (PERCOCET)  mg per tablet Take 1 tablet by mouth 3 (three) times a day as needed for moderate pain   Yes Historical Provider, MD   pantoprazole (PROTONIX) 40 mg tablet Take 1 tablet (40 mg total) by mouth daily 1/10/24 11/17/24 Yes Paco Boyer MD   senna (SENOKOT) 8.6 mg Take 2 tablets (17.2 mg total) by mouth daily at bedtime  Patient taking differently: Take 17.2 mg by mouth daily as needed for constipation 6/5/24  Yes NANY Ruby   torsemide (DEMADEX) 10 mg tablet Take 3 tablets (30 mg total) by mouth daily 10/31/24  Yes May Alvarado DO   acetaminophen (TYLENOL) 325 mg tablet Take 3 tablets (975 mg total) by mouth every 8 (eight) hours  Patient not taking: Reported on 11/17/2024 10/19/24   Paco Carmona MD   aluminum-magnesium hydroxide-simethicone (MAALOX) 1154-6698-172 mg/30 mL suspension Take 30 mL by mouth every 6 (six) hours as needed for indigestion or heartburn  Patient not taking: Reported on 11/17/2024 10/19/24   Paco Carmona MD   docusate sodium (COLACE) 100 mg capsule Take 1 capsule (100 mg total) by mouth 2 (two) times a day  Patient not taking: Reported on 11/17/2024 6/5/24   NANY Ruby   gabapentin (NEURONTIN) 100 mg capsule Take 1 capsule (100 mg total) by mouth daily at bedtime  Patient not taking: Reported on 11/17/2024 10/30/24   May Alvarado DO   lidocaine (LIDODERM) 5 % Apply 1 patch topically over 12 hours daily Remove & Discard patch within 12 hours or as directed by MD  Patient not taking: Reported on 11/17/2024 10/20/24   Paco Carmona MD   oxyCODONE (ROXICODONE) 10 MG TABS Take 1 tablet (10 mg total) by mouth every 6 (six) hours as needed for severe pain Max Daily Amount: 40 mg  Patient not taking: Reported on 11/17/2024 10/30/24   May Alvarado DO   polyethylene glycol (MIRALAX) 17 g packet Take 17  g by mouth daily  Patient not taking: Reported on 11/17/2024 6/5/24   NANY Ruby   sucralfate (CARAFATE) 1 g tablet Take 1 tablet (1 g total) by mouth 2 (two) times a day before meals for 7 days 10/19/24 10/26/24  Paco Carmona MD   temazepam (RESTORIL) 15 mg capsule Take 1 capsule (15 mg total) by mouth daily at bedtime as needed for sleep  Patient not taking: Reported on 11/17/2024 10/30/24   Damionjacobeugene Alvarado DO       Vitals:    11/17/24 2228 11/18/24 0300 11/18/24 0554 11/18/24 0739   BP: 115/55 127/60  141/75   BP Location: Right arm Right arm  Right arm   Pulse: 82 78  80   Resp: 18 18 18   Temp: 97.6 °F (36.4 °C) (!) 97.1 °F (36.2 °C)     TempSrc: Temporal Temporal     SpO2: 94% 93%  94%   Weight:   85 kg (187 lb 6.3 oz)    Height:         Exam:  Physical Exam  Vitals and nursing note reviewed.   Constitutional:       General: She is not in acute distress.     Appearance: Normal appearance. She is not ill-appearing.   HENT:      Head: Normocephalic and atraumatic.      Nose: Nose normal.      Mouth/Throat:      Mouth: Mucous membranes are moist.   Eyes:      General: No scleral icterus.  Neck:      Vascular: No JVD.   Cardiovascular:      Rate and Rhythm: Normal rate and regular rhythm.      Pulses:           Radial pulses are 2+ on the right side and 2+ on the left side.      Heart sounds: Murmur heard.      Systolic murmur is present with a grade of 2/6.      Diastolic murmur is present.   Pulmonary:      Effort: Pulmonary effort is normal. No respiratory distress.      Breath sounds: Normal breath sounds. No stridor. No wheezing, rhonchi or rales.   Abdominal:      General: Abdomen is flat.   Musculoskeletal:         General: Swelling (edema present above wrapped ankles) present. Normal range of motion.      Cervical back: Normal range of motion.      Right lower leg: Edema present.      Left lower leg: Edema present.   Skin:     General: Skin is warm and dry.      Capillary Refill:  "Capillary refill takes less than 2 seconds.   Neurological:      Mental Status: She is alert. Mental status is at baseline.   Psychiatric:         Thought Content: Thought content normal.        DATA:        ECG:      Holter:  Loop recorder 09/17/24  MDT [id number] ICM - ACTIVE SYSTEM IS MRI CONDITIONAL   CARELINK TRANSMISSION: LOOP RECORDER. PRESENTING RHYTHM NSR @ 75 BPM. BATTERY STATUS \"OK.\" NO PATIENT OR DEVICE ACTIVATED EPISODES. NORMAL DEVICE FUNCTION. DL     Telemetry:   Normal sinus rhythm,  HR 80    Echocardiogram:  05/23/24    Left Ventricle: Left ventricular cavity size is normal. Wall thickness is mildly increased. The left ventricular ejection fraction is 70%. Systolic function is vigorous. Wall motion is normal. Diastolic function is mildly abnormal, consistent with grade I (abnormal) relaxation.  There is mild dynamic LVOT obstruction (peak pressure gradient 25 mmHg with valsalva).    Mitral Valve: There is moderate annular calcification.    Tricuspid Valve: There is mild regurgitation.     Ischemic Testing:  Stress test:   05/21/18   SUMMARY:  -  Stress results: There was no chest pain during stress.  -  ECG conclusions: The stress ECG was negative for ischemia and normal.  -  Perfusion imaging: There were no perfusion defects.  -  Gated SPECT: The calculated left ventricular ejection fraction was 69 %. Left ventricular ejection fraction was within normal limits by visual estimate. There was no left ventricular regional abnormality.     Catheterization:   11/14/23    Diffuse calcified CAD. No focal significant lesions.    1st Mrg lesion is 70% stenosed.    First Obtuse Marginal Branch 1st Mrg lesion is 70% stenosed. The vessel size is medium. LETY flow is 3. The lesion is non high-C and focal. Proximal vessel. iFR was measured in the 1st Mrg. iFR ratio: 0.95. The iFR was nonsignificant, not intervened.    Weights:    Wt Readings from Last 10 Encounters:   11/18/24 85 kg (187 lb 6.3 oz)   10/18/24 74.3 kg " (163 lb 12.8 oz)   10/02/24 85.5 kg (188 lb 9.6 oz)   09/26/24 86.2 kg (190 lb)   09/12/24 81.2 kg (179 lb)   08/07/24 81.2 kg (179 lb)   07/24/24 79.8 kg (176 lb)   06/26/24 81.2 kg (179 lb)   06/18/24 81.2 kg (179 lb)   06/05/24 83 kg (182 lb 15.7 oz)     Lab Studies:               Results from last 7 days   Lab Units 11/18/24  0526 11/17/24  1020   WBC Thousand/uL 5.74 6.63   HEMOGLOBIN g/dL 9.2* 10.0*   HEMATOCRIT % 29.3* 31.7*   PLATELETS Thousands/uL 235 237     Results from last 7 days   Lab Units 11/18/24  0526 11/17/24  1020   POTASSIUM mmol/L 4.2 4.3   CHLORIDE mmol/L 100 98   CO2 mmol/L 30 32   BUN mg/dL 28* 24   CREATININE mg/dL 1.33* 1.23   CALCIUM mg/dL 8.8 9.4   ALK PHOS U/L 82 101   ALT U/L 5* 8   AST U/L 11* 13     Rukhsana Dasilva PA-C

## 2024-11-18 NOTE — PROGRESS NOTES
Progress Note - Hospitalist   Name: Mattie Varela 79 y.o. female I MRN: [id number]  Unit/Bed#: E4 -01 I Date of Admission: 11/17/2024   Date of Service: 11/18/2024 I Hospital Day: 1    Assessment & Plan  Acute on chronic diastolic congestive heart failure (HCC)  Wt Readings from Last 3 Encounters:   11/18/24 85 kg (187 lb 6.3 oz)   10/18/24 74.3 kg (163 lb 12.8 oz)   10/02/24 85.5 kg (188 lb 9.6 oz)     79 year old female presented with shortness of breath, lower extremity edema, volume overload, and weight gain secondary to acute on chronic diastolic congestive heart failure.  Takes torsemide 30mg daily  Appreciate cardiology consult  Continue IV lasix 40mg bid   Daily weights, I&Os, Na/fluid restriction  Continue metoprolol     Type 2 diabetes mellitus with other skin complications ([facility])  Lab Results   Component Value Date    HGBA1C 7.4 (H) 10/12/2024       Recent Labs     11/17/24  1633 11/17/24  2112 11/18/24  0740   POCGLU 113 200* 138       Blood Sugar Average: Last 72 hrs:  (P) [id number]  Hold Metformin  SSI & acu-checks ac/hs    Chronic pain disorder  On as needed Percocet at home  C/w oxycodone 5mg prn for moderate pain & oxycodone 10mg for severe pain with acute fx  Hypertension  Continue Metoprolol  Hypothyroidism  Continue levothyroxine  Patella fracture  CT left knee showing what appears to be a fracture of her bipartite patella   Appreciate orthopedic consult  Knee immobilizer x 2 weeks with WBAT to LLE   Office follow-up in 2 weeks with repeat x-rays   Urinary retention  Had overactive bladder in the past and received Botox. Continues to have urinary retention requiring ahn catheter placement. For outpatient voiding trial.  UA >100,000  cfu GNR. Was recently treated for Klebsiella cystitis. Without symptoms, fever or leukocytosis & chronic ahn, monitor off abx  Stage 3a chronic kidney disease (HCC)  Lab Results   Component Value Date    EGFR 37 11/18/2024    EGFR 41 11/17/2024     EGFR 37 10/30/2024    CREATININE 1.33 (H) 11/18/2024    CREATININE 1.23 11/17/2024    CREATININE 1.33 (H) 10/30/2024     Baseline around 1.3   Monitor closely with diuresis     VTE Pharmacologic Prophylaxis:   lovenox    Mobility:   Basic Mobility Inpatient Raw Score: 12  JH-HLM Goal: 4: Move to chair/commode  JH-HLM Achieved: 2: Bed activities/Dependent transfer  JH-HLM Goal NOT achieved. Continue with multidisciplinary rounding and encourage appropriate mobility to improve upon JH-HLM goals.    Patient Centered Rounds: I performed bedside rounds with nursing staff today.   Discussions with Specialists or Other Care Team Provider: case management    Education and Discussions with Family / Patient: Updated  (daughter) via phone.    Current Length of Stay: 1 day(s)  Current Patient Status: Inpatient   Certification Statement: The patient will continue to require additional inpatient hospital stay due to IV diuresis   Discharge Plan: Anticipate discharge in 48-72 hrs to discharge location to be determined pending rehab evaluations.    Code Status: Level 1 - Full Code    Subjective   Patient seen and examined. Complaining of both knee and back pain. Reports a productive cough. SOB improved today, no chest pain.     Objective :  Temp:  [96.5 °F (35.8 °C)-98.5 °F (36.9 °C)] 97.1 °F (36.2 °C)  HR:  [78-96] 80  BP: (115-162)/(55-75) 141/75  Resp:  [18-25] 18  SpO2:  [93 %-98 %] 94 %  O2 Device: None (Room air)    Body mass index is 36.6 kg/m².     Input and Output Summary (last 24 hours):     Intake/Output Summary (Last 24 hours) at 11/18/2024 1123  Last data filed at 11/18/2024 0853  Gross per 24 hour   Intake 540 ml   Output 2425 ml   Net -1885 ml       Physical Exam  Constitutional:       General: She is not in acute distress.  HENT:      Head: Normocephalic and atraumatic.   Cardiovascular:      Rate and Rhythm: Normal rate and regular rhythm.      Heart sounds: Murmur heard.   Pulmonary:      Effort:  Pulmonary effort is normal.      Breath sounds: Normal breath sounds.   Abdominal:      General: Abdomen is flat. Bowel sounds are normal. There is no distension.      Palpations: Abdomen is soft.   Musculoskeletal:      Right lower leg: Edema present.      Left lower leg: Edema present.   Skin:     General: Skin is warm and dry.   Neurological:      General: No focal deficit present.      Mental Status: She is alert and oriented to person, place, and time.           Lines/Drains:  Lines/Drains/Airways       Active Status       Name Placement date Placement time Site Days    Urethral Catheter 16 Fr. 10/26/24  1311  --  22                  Urinary Catheter:  Goal for removal: N/A - Chronic Bateman           Telemetry:  Telemetry Orders (From admission, onward)               24 Hour Telemetry Monitoring  Continuous x 24 Hours (Telem)        Expiring   Question:  Reason for 24 Hour Telemetry  Answer:  Decompensated CHF- and any one of the following: continuous diuretic infusion or total diuretic dose >200 mg daily, associated electrolyte derangement (I.e. K < 3.0), ionotropic drip (continuous infusion), hx of ventricular arrhythmia, or new EF < 35%                     Telemetry Reviewed: Normal Sinus Rhythm  Indication for Continued Telemetry Use: No indication for continued use. Will discontinue.                Lab Results: I have reviewed the following results:   Results from last 7 days   Lab Units 11/18/24  0526   WBC Thousand/uL 5.74   HEMOGLOBIN g/dL 9.2*   HEMATOCRIT % 29.3*   PLATELETS Thousands/uL 235   SEGS PCT % 55   LYMPHO PCT % 28   MONO PCT % 11   EOS PCT % 4     Results from last 7 days   Lab Units 11/18/24  0526   SODIUM mmol/L 138   POTASSIUM mmol/L 4.2   CHLORIDE mmol/L 100   CO2 mmol/L 30   BUN mg/dL 28*   CREATININE mg/dL 1.33*   ANION GAP mmol/L 8   CALCIUM mg/dL 8.8   ALBUMIN g/dL 3.0*   TOTAL BILIRUBIN mg/dL 0.29   ALK PHOS U/L 82   ALT U/L 5*   AST U/L 11*   GLUCOSE RANDOM mg/dL 141*          Results from last 7 days   Lab Units 11/18/24  0740 11/17/24  2112 11/17/24  1633   POC GLUCOSE mg/dl 138 200* 113               Recent Cultures (last 7 days):   Results from last 7 days   Lab Units 11/17/24  1002   URINE CULTURE  >100,000 cfu/ml Gram Negative Igor Enteric Like*  <10,000 cfu/ml Proteus species*       Imaging Results Review: I reviewed radiology reports from this admission including: CT lower extremity and chest xray.  Other Study Results Review: No additional pertinent studies reviewed.    Last 24 Hours Medication List:     Current Facility-Administered Medications:     acetaminophen (TYLENOL) tablet 650 mg, Q6H PRN    albuterol (PROVENTIL HFA,VENTOLIN HFA) inhaler 2 puff, Q6H PRN    aspirin chewable tablet 81 mg, Daily    atorvastatin (LIPITOR) tablet 40 mg, Daily With Dinner    baclofen tablet 10 mg, BID    enoxaparin (LOVENOX) subcutaneous injection 40 mg, Daily    ferrous sulfate tablet 325 mg, Daily With Breakfast    furosemide (LASIX) injection 40 mg, BID (diuretic)    guaiFENesin (MUCINEX) 12 hr tablet 600 mg, Q12H ARY    insulin lispro (HumALOG/ADMELOG) 100 units/mL subcutaneous injection 1-6 Units, TID AC **AND** Fingerstick Glucose (POCT), TID AC    latanoprost (XALATAN) 0.005 % ophthalmic solution 1 drop, HS    levothyroxine tablet 37.5 mcg, Early Morning    magnesium sulfate 2 g/50 mL IVPB (premix) 2 g, Once, Last Rate: 2 g (11/18/24 1006)    meclizine (ANTIVERT) tablet 12.5 mg, Daily PRN    melatonin tablet 3 mg, HS PRN    metoprolol succinate (TOPROL-XL) 24 hr tablet 50 mg, Q12H ARY    Milnacipran HCl TABS 100 mg, Daily    oxyCODONE-acetaminophen (PERCOCET) 5-325 mg per tablet 1 tablet, TID PRN    pantoprazole (PROTONIX) EC tablet 40 mg, Early Morning    senna (SENOKOT) tablet 17.2 mg, BID    Administrative Statements   Today, Patient Was Seen By: Krystina Preston PA-C      **Please Note: This note may have been constructed using a voice recognition system.**

## 2024-11-18 NOTE — ASSESSMENT & PLAN NOTE
Wt Readings from Last 3 Encounters:   11/18/24 85 kg (187 lb 6.3 oz)   10/18/24 74.3 kg (163 lb 12.8 oz)   10/02/24 85.5 kg (188 lb 9.6 oz)     -Management per primary

## 2024-11-18 NOTE — ASSESSMENT & PLAN NOTE
CT left knee showing what appears to be a fracture of her bipartite patella   Appreciate orthopedic consult  Knee immobilizer x 2 weeks with WBAT to LLE   Office follow-up in 2 weeks with repeat x-rays

## 2024-11-18 NOTE — ASSESSMENT & PLAN NOTE
Lab Results   Component Value Date    EGFR 37 11/18/2024    EGFR 41 11/17/2024    EGFR 37 10/30/2024    CREATININE 1.33 (H) 11/18/2024    CREATININE 1.23 11/17/2024    CREATININE 1.33 (H) 10/30/2024     Baseline around 1.3   Monitor closely with diuresis

## 2024-11-18 NOTE — ASSESSMENT & PLAN NOTE
- TTE 05/23/24: LVEF 70%, grade I diastolic dysfunction, mild LVOT obstruction, moderate annular calcification, mild TR   - BNP 11/17/24: 264 (BNP 05/22/24 was 302)  - CXR 11/17/24: no acute cardiopulmonary disease   - about   - Neurohormonal Blockade:   -- beta blocker: Toprol-XL 50 mg BID  -- ACE/ARB/ARNi: none  -- aldosterone antagonist: none   -- SGLT2: none   -- diuretic: torsemide 30 mg daily  -- inpatient diuretic: IV lasix 40 mg BID

## 2024-11-18 NOTE — RESTORATIVE TECHNICIAN NOTE
Restorative Technician Note      Patient Name: Mattie Sureshcoopergisell     Restorative Tech Visit Date: 11/18/24  Note Type: Mobility  Patient Position Upon Consult: Supine  Activity Performed: Repositioned  Patient Position at End of Consult: All needs within reach; Supine; Bed/Chair alarm activated            ns

## 2024-11-18 NOTE — ASSESSMENT & PLAN NOTE
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

## 2024-11-18 NOTE — ASSESSMENT & PLAN NOTE
- CT of the Left knee reviewed today showing what appears to be a fracture of her bipartite patella  - Continue with KI.  - WBAT LLE with knee immobilizer on  - PT/OT  - Pain control PRN  - Medical management per primary  - Follow up outpatient in 2 weeks with Dr. Velasco in the office with repeat xrays.  - Ortho will sign off.

## 2024-11-18 NOTE — ASSESSMENT & PLAN NOTE
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

## 2024-11-18 NOTE — UTILIZATION REVIEW
NOTIFICATION OF INPATIENT ADMISSION   AUTHORIZATION REQUEST   SERVICING FACILITY:   [place] [address]  Tax ID: [id number]  NPI: [id number] ATTENDING PROVIDER:  Attending Name and NPI#: Perlita Torres Md [[id number]]  Address: [address] [place] [address]  Phone: [phone]     ADMISSION INFORMATION:  Place of Service: Inpatient [facility]  Place of Service Code: 21  Inpatient Admission Date/Time: 11/17/24 11:58 AM  Discharge Date/Time: No discharge date for patient encounter.  Admitting Diagnosis Code/Description:  Swelling [R60.9]  Knee pain [M25.569]  Fatigue [R53.83]  Dyspnea [R06.00]  CHF exacerbation (HCC) [I50.9]     UTILIZATION REVIEW CONTACT:  Lorraine Carter, Utilization   Network Utilization Review Department  Phone: [phone]  Fax [phone]  Email: [email or web address]  Contact for approvals/pending authorizations, clinical reviews, and discharge.     PHYSICIAN ADVISORY SERVICES:  Medical Necessity Denial & Qesw-jq-Vuji Review  Phone: [phone]  Fax: [phone]  Email: [email or web address]     DISCHARGE SUPPORT TEAM:  For Patients Discharge Needs & Updates  Phone: [phone] opt. 2 Fax: [phone]  Email: [email or web address]

## 2024-11-18 NOTE — ASSESSMENT & PLAN NOTE
Lab Results   Component Value Date    HGBA1C 7.4 (H) 10/12/2024       Recent Labs     11/17/24  1633 11/17/24  2112 11/18/24  0740   POCGLU 113 200* 138       Blood Sugar Average: Last 72 hrs:  (P) 150.3954494619111139  Hold Metformin  SSI & acu-checks ac/hs

## 2024-11-18 NOTE — ASSESSMENT & PLAN NOTE
Lab Results   Component Value Date    EGFR 37 11/18/2024    EGFR 41 11/17/2024    EGFR 37 10/30/2024    CREATININE 1.33 (H) 11/18/2024    CREATININE 1.23 11/17/2024    CREATININE 1.33 (H) 10/30/2024

## 2024-11-18 NOTE — ASSESSMENT & PLAN NOTE
- history of urinary retention with spine stimulator in place   - continues to have urinary retention requiring Bateman catheter   - need outpatient voiding trial

## 2024-11-18 NOTE — PLAN OF CARE
Problem: Potential for Falls  Goal: Patient will remain free of falls  Description: INTERVENTIONS:  - Educate patient/family on patient safety including physical limitations  - Instruct patient to call for assistance with activity   - Consult OT/PT to assist with strengthening/mobility   - Keep Call bell within reach  - Keep bed low and locked with side rails adjusted as appropriate  - Keep care items and personal belongings within reach  - Initiate and maintain comfort rounds  - Make Fall Risk Sign visible to staff  - Offer Toileting every 2 Hours, in advance of need  - Initiate/Maintain bed alarm  - Obtain necessary fall risk management equipment:   - Apply yellow socks and bracelet for high fall risk patients  - Consider moving patient to room near nurses station  Outcome: Progressing     Problem: PAIN - ADULT  Goal: Verbalizes/displays adequate comfort level or baseline comfort level  Description: Interventions:  - Encourage patient to monitor pain and request assistance  - Assess pain using appropriate pain scale  - Administer analgesics based on type and severity of pain and evaluate response  - Implement non-pharmacological measures as appropriate and evaluate response  - Consider cultural and social influences on pain and pain management  - Notify physician/advanced practitioner if interventions unsuccessful or patient reports new pain  Outcome: Progressing     Problem: SAFETY ADULT  Goal: Patient will remain free of falls  Description: INTERVENTIONS:  - Educate patient/family on patient safety including physical limitations  - Instruct patient to call for assistance with activity   - Consult OT/PT to assist with strengthening/mobility   - Keep Call bell within reach  - Keep bed low and locked with side rails adjusted as appropriate  - Keep care items and personal belongings within reach  - Initiate and maintain comfort rounds  - Make Fall Risk Sign visible to staff  - Offer Toileting every 2 Hours, in  advance of need  - Initiate/Maintain bed alarm  - Obtain necessary fall risk management equipment:   - Apply yellow socks and bracelet for high fall risk patients  - Consider moving patient to room near nurses station  Outcome: Progressing  Goal: Maintain or return to baseline ADL function  Description: INTERVENTIONS:  -  Assess patient's ability to carry out ADLs; assess patient's baseline for ADL function and identify physical deficits which impact ability to perform ADLs (bathing, care of mouth/teeth, toileting, grooming, dressing, etc.)  - Assess/evaluate cause of self-care deficits   - Assess range of motion  - Assess patient's mobility; develop plan if impaired  - Assess patient's need for assistive devices and provide as appropriate  - Encourage maximum independence but intervene and supervise when necessary  - Involve family in performance of ADLs  - Assess for home care needs following discharge   - Consider OT consult to assist with ADL evaluation and planning for discharge  - Provide patient education as appropriate  Outcome: Progressing  Goal: Maintains/Returns to pre admission functional level  Description: INTERVENTIONS:  - Perform AM-PAC 6 Click Basic Mobility/ Daily Activity assessment daily.  - Set and communicate daily mobility goal to care team and patient/family/caregiver.   - Collaborate with rehabilitation services on mobility goals if consulted  - Perform Range of Motion 3 times a day.  - Reposition patient every 3 hours.  - Dangle patient 3 times a day  - Stand patient 3 times a day  - Ambulate patient 3 times a day  - Out of bed to chair 3 times a day   - Out of bed for meals 3 times a day  - Out of bed for toileting  - Record patient progress and toleration of activity level   Outcome: Progressing     Problem: DISCHARGE PLANNING  Goal: Discharge to home or other facility with appropriate resources  Description: INTERVENTIONS:  - Identify barriers to discharge w/patient and caregiver  -  Arrange for needed discharge resources and transportation as appropriate  - Identify discharge learning needs (meds, wound care, etc.)  - Arrange for interpretive services to assist at discharge as needed  - Refer to Case Management Department for coordinating discharge planning if the patient needs post-hospital services based on physician/advanced practitioner order or complex needs related to functional status, cognitive ability, or social support system  Outcome: Progressing     Problem: Knowledge Deficit  Goal: Patient/family/caregiver demonstrates understanding of disease process, treatment plan, medications, and discharge instructions  Description: Complete learning assessment and assess knowledge base.  Interventions:  - Provide teaching at level of understanding  - Provide teaching via preferred learning methods  Outcome: Progressing     Problem: CARDIOVASCULAR - ADULT  Goal: Maintains optimal cardiac output and hemodynamic stability  Description: INTERVENTIONS:  - Monitor I/O, vital signs and rhythm  - Monitor for S/S and trends of decreased cardiac output  - Administer and titrate ordered vasoactive medications to optimize hemodynamic stability  - Assess quality of pulses, skin color and temperature  - Assess for signs of decreased coronary artery perfusion  - Instruct patient to report change in severity of symptoms  Outcome: Progressing  Goal: Absence of cardiac dysrhythmias or at baseline rhythm  Description: INTERVENTIONS:  - Continuous cardiac monitoring, vital signs, obtain 12 lead EKG if ordered  - Administer antiarrhythmic and heart rate control medications as ordered  - Monitor electrolytes and administer replacement therapy as ordered  Outcome: Progressing     Problem: RESPIRATORY - ADULT  Goal: Achieves optimal ventilation and oxygenation  Description: INTERVENTIONS:  - Assess for changes in respiratory status  - Assess for changes in mentation and behavior  - Position to facilitate oxygenation  and minimize respiratory effort  - Oxygen administered by appropriate delivery if ordered  - Initiate smoking cessation education as indicated  - Encourage broncho-pulmonary hygiene including cough, deep breathe, Incentive Spirometry  - Assess the need for suctioning and aspirate as needed  - Assess and instruct to report SOB or any respiratory difficulty  - Respiratory Therapy support as indicated  Outcome: Progressing     Problem: Prexisting or High Potential for Compromised Skin Integrity  Goal: Skin integrity is maintained or improved  Description: INTERVENTIONS:  - Identify patients at risk for skin breakdown  - Assess and monitor skin integrity  - Assess and monitor nutrition and hydration status  - Monitor labs   - Assess for incontinence   - Turn and reposition patient  - Assist with mobility/ambulation  - Relieve pressure over bony prominences  - Avoid friction and shearing  - Provide appropriate hygiene as needed including keeping skin clean and dry  - Evaluate need for skin moisturizer/barrier cream  - Collaborate with interdisciplinary team   - Patient/family teaching  - Consider wound care consult   Outcome: Progressing

## 2024-11-18 NOTE — ASSESSMENT & PLAN NOTE
- TTE 05/23/24: LVEF 70%, grade I diastolic dysfunction, mild LVOT obstruction, moderate annular calcification, mild TR   - BNP 11/17/24: 264 (BNP 05/22/24 was 302)  - CXR 11/17/24: no acute cardiopulmonary disease   - about   - Neurohormonal Blockade:   -- beta blocker: Toprol-XL 50 mg BID  -- ACE/ARB/ARNi: none  -- aldosterone antagonist: none   -- SGLT2: none   -- diuretic: torsemide 30 mg daily  -- inpatient diuretic: lasix 40 mg BID

## 2024-11-18 NOTE — ASSESSMENT & PLAN NOTE
Had overactive bladder in the past and received Botox. Continues to have urinary retention requiring ahn catheter placement. For outpatient voiding trial.  UA >100,000  cfu GNR. Was recently treated for Klebsiella cystitis. Without symptoms, fever or leukocytosis & chronic ahn, monitor off abx

## 2024-11-18 NOTE — ASSESSMENT & PLAN NOTE
Wt Readings from Last 3 Encounters:   11/18/24 85 kg (187 lb 6.3 oz)   10/18/24 74.3 kg (163 lb 12.8 oz)   10/02/24 85.5 kg (188 lb 9.6 oz)     79 year old female presented with shortness of breath, lower extremity edema, volume overload, and weight gain secondary to acute on chronic diastolic congestive heart failure.  Takes torsemide 30mg daily  Appreciate cardiology consult  Continue IV lasix 40mg bid   Daily weights, I&Os, Na/fluid restriction  Continue metoprolol

## 2024-11-18 NOTE — ASSESSMENT & PLAN NOTE
Lab Results   Component Value Date    HGBA1C 7.4 (H) 10/12/2024       Recent Labs     11/17/24  1633 11/17/24  2112 11/18/24  0740   POCGLU 113 200* 138       Blood Sugar Average: Last 72 hrs:  (P) [id number]    -Management per primary

## 2024-11-18 NOTE — CASE MANAGEMENT
Case Management Assessment & Discharge Planning Note    Patient name Mattie Varela  Location [facility] /E4 -* MRN [id number]  : 1945 Date 2024       Current Admission Date: 2024  Current Admission Diagnosis:Acute on chronic diastolic congestive heart failure (HCC)   Patient Active Problem List    Diagnosis Date Noted Date Diagnosed    Patella fracture 2024     Pain in both lower extremities 10/30/2024     Chronic heart failure with preserved ejection fraction (HFpEF) ([facility]) 10/25/2024     Klebsiella cystitis 10/25/2024     Acute kidney injury superimposed on chronic kidney disease  ([facility]) 10/18/2024     Acute low back pain 10/13/2024     Chest pain 10/11/2024     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     Generalized edema 2024     Constipation 2024     Leg pain, right 2024     Acute on chronic diastolic congestive heart failure ([facility]) 2024     s/p Medtr[employer] loop recorder 3/2/2024 2024     Moderate protein-calorie malnutrition ([facility]) 2024     Hypertensive urgency 2024     AMS (altered mental status) 2024     Encounter for examination for admission to nursing home 2024     Stage 3a chronic kidney disease ([facility]) 01/10/2024     Left ventricular outflow obstruction 2024     Obesity, Class I, BMI 30-34.9 2024     Urinary retention 2023     SADAF (acute kidney injury) ([facility]) 2023     Exertional shortness of breath 2023     Non obstructive CAD 11/15/2023     History of lumbar surgery 11/10/2023     Abnormal urinalysis 2023     Chronic obstructive pulmonary disease, unspecified COPD type ([facility]) 2023     Confusion with body shakes and blank stare 2023     Anemia in stage 3a chronic kidney disease  ([facility]) 2023     Lower extremity edema 2023     Cerebrovascular accident (CVA), unspecified mechanism ([facility]) 2022     Prepyloric ulcer 2021     Diarrhea 2021     Mild  intermittent asthma without complication 03/11/2020     S/P insertion of spinal cord stimulator 07/18/2018     Iron deficiency anemia secondary to inadequate dietary iron intake 06/19/2018     Type 2 diabetes mellitus with other skin complications (HCC)      Failed back surgical syndrome 03/16/2018     Hypothyroidism 11/20/2017     Degenerative lumbar spinal stenosis 01/25/2017     Glaucoma 01/06/2017     Overactive bladder 08/04/2016     Dyspepsia 02/09/2016     Hypercholesterolemia 02/09/2016     Anxiety 02/09/2015     Chronic pain disorder 12/18/2013     Hypertension 06/12/2013       LOS (days): 1  Geometric Mean LOS (GMLOS) (days): 3.9  Days to GMLOS:2.8     OBJECTIVE:  PATIENT READMITTED TO HOSPITAL  Risk of Unplanned Readmission Score: 42.98         Current admission status: Inpatient       Preferred Pharmacy:   [facility] [address]  [address]  [address] [place] [id number]  Phone: [phone] Fax: [phone]    [facility] [place] - [address]  [facility],  [address]  [facility],  First Floor [facility] [address]  Phone: [phone] Fax: [phone]    [facility] [place], [place] - [address]  [address]  [place] [address]  Phone: [phone] Fax: [phone]    Primary Care Provider: NANY Pollock    Primary Insurance: [employer] [facility]  Secondary Insurance:     ASSESSMENT:  Active Health Care Proxies       Venice Baires [facility] Care Representative - Daughter   Primary Phone: [phone] (Mobile)  Home Phone: [phone]                           Readmission Root Cause  30 Day Readmission: Yes  During your hospital stay, did someone (provider, nurse, ) explain your care to you in a way you could understand?: Yes  Did you feel medically stable to leave the hospital?: Yes  Were you able to pay for your medication at the pharmacy?: Yes  Did you have reliable transportation to take you to  your appointments?: Yes  During previous admission, was a post-acute recommendation made?: Yes  What post-acute resources were offered?: STR  Patient was readmitted due to: Acute on chronic diastolic congestive heart failure  Action Plan: admit to med/surg    Patient Information  Admitted from:: Facility (resident at [facility])  Mental Status: Alert  During Assessment patient was accompanied by: Not accompanied during assessment  Assessment information provided by:: Patient  Primary Caregiver: Other (Comment)  Caregiver's Name:: resident at [facility]  Support Systems: Children  County of Residence: [place]  What city do you live in?: [place]  Home entry access options. Select all that apply.: No steps to enter home  Type of Current Residence: Facility  Living Arrangements: Lives in Facility  Is patient a ?: No    Activities of Daily Living Prior to Admission  Functional Status: Assistance  Completes ADLs independently?: No  Level of ADL dependence: Assistance  Ambulates independently?: Yes  Does patient use assisted devices?: Yes  Assisted Devices (DME) used: Walker  Does patient currently own DME?: Yes  What DME does the patient currently own?: Walker  Does the patient have a history of Short-Term Rehab?: Yes ([facility])  Does patient have a history of HHC?: Yes ([facility])         Patient Information Continued  Income Source: Pension/senior living  Does patient have prescription coverage?: Yes  Does patient receive dialysis treatments?: No  Does patient have a history of substance abuse?: No  Does patient have a history of Mental Health Diagnosis?: No         Means of Transportation  Means of Transport to Appts:: Family transport          DISCHARGE DETAILS:    Discharge planning discussed with:: patient  Freedom of Choice: Yes  Comments - Freedom of Choice: does NOT want to return to [facility] contacted family/caregiver?: Yes (left message for daughter Venice)  Were Treatment  Team discharge recommendations reviewed with patient/caregiver?: Yes  Did patient/caregiver verbalize understanding of patient care needs?: Yes  Were patient/caregiver advised of the risks associated with not following Treatment Team discharge recommendations?: Yes    Contacts  Patient Contacts: daughter, Dr. Venice Baires -- left message  Relationship to Patient:: Family  Contact Method: Phone  Phone Number: [phone]                        Treatment Team Recommendation: Facility Return  Discharge Destination Plan:: Facility Return  Transport at Discharge : Stretcher van                                      Additional Comments: Introduced self and role to patient at bedside, left message for daughter Venice.  Patient is a resident at [facility], was at [facility] for rehab, fell with possible fracture.  Patient states she does NOT want to return to [facility] if continued rehab necessary.  Patient has [facility], left a message for [facility]  [facility] , [phone].  CM will continue to follow.           ADDENDUM:  Received call back from daughter Dr. Veniec Baires, does NOT want patient to go back to rehab, would like her to return to [facility] with PT/OT there.  States the episode at [facility] was upsetting, believes that patient will get better care returning to [facility].  Daughter would like to be involved in discharge planning process.  CM to follow.

## 2024-11-18 NOTE — PLAN OF CARE
Problem: Potential for Falls  Goal: Patient will remain free of falls  Description: INTERVENTIONS:  - Educate patient/family on patient safety including physical limitations  - Instruct patient to call for assistance with activity   - Consult OT/PT to assist with strengthening/mobility   - Keep Call bell within reach  - Keep bed low and locked with side rails adjusted as appropriate  - Keep care items and personal belongings within reach  - Initiate and maintain comfort rounds  - Make Fall Risk Sign visible to staff  - Offer Toileting every 2 Hours, in advance of need  - Initiate/Maintain bed alarm  - Obtain necessary fall risk management equipment:   - Apply yellow socks and bracelet for high fall risk patients  - Consider moving patient to room near nurses station  Outcome: Progressing     Problem: PAIN - ADULT  Goal: Verbalizes/displays adequate comfort level or baseline comfort level  Description: Interventions:  - Encourage patient to monitor pain and request assistance  - Assess pain using appropriate pain scale  - Administer analgesics based on type and severity of pain and evaluate response  - Implement non-pharmacological measures as appropriate and evaluate response  - Consider cultural and social influences on pain and pain management  - Notify physician/advanced practitioner if interventions unsuccessful or patient reports new pain  Outcome: Progressing     Problem: SAFETY ADULT  Goal: Patient will remain free of falls  Description: INTERVENTIONS:  - Educate patient/family on patient safety including physical limitations  - Instruct patient to call for assistance with activity   - Consult OT/PT to assist with strengthening/mobility   - Keep Call bell within reach  - Keep bed low and locked with side rails adjusted as appropriate  - Keep care items and personal belongings within reach  - Initiate and maintain comfort rounds  - Make Fall Risk Sign visible to staff  - Offer Toileting every 2 Hours, in  advance of need  - Initiate/Maintain bed alarm  - Obtain necessary fall risk management equipment:   - Apply yellow socks and bracelet for high fall risk patients  - Consider moving patient to room near nurses station  Outcome: Progressing  Goal: Maintain or return to baseline ADL function  Description: INTERVENTIONS:  -  Assess patient's ability to carry out ADLs; assess patient's baseline for ADL function and identify physical deficits which impact ability to perform ADLs (bathing, care of mouth/teeth, toileting, grooming, dressing, etc.)  - Assess/evaluate cause of self-care deficits   - Assess range of motion  - Assess patient's mobility; develop plan if impaired  - Assess patient's need for assistive devices and provide as appropriate  - Encourage maximum independence but intervene and supervise when necessary  - Involve family in performance of ADLs  - Assess for home care needs following discharge   - Consider OT consult to assist with ADL evaluation and planning for discharge  - Provide patient education as appropriate  Outcome: Progressing  Goal: Maintains/Returns to pre admission functional level  Description: INTERVENTIONS:  - Perform AM-PAC 6 Click Basic Mobility/ Daily Activity assessment daily.  - Set and communicate daily mobility goal to care team and patient/family/caregiver.   - Collaborate with rehabilitation services on mobility goals if consulted  - Perform Range of Motion 3 times a day.  - Reposition patient every 2 hours.  - Dangle patient 3 times a day  - Stand patient 3 times a day  - Ambulate patient 3 times a day  - Out of bed to chair 3 times a day   - Out of bed for meals 3 times a day  - Out of bed for toileting  - Record patient progress and toleration of activity level   Outcome: Progressing     Problem: DISCHARGE PLANNING  Goal: Discharge to home or other facility with appropriate resources  Description: INTERVENTIONS:  - Identify barriers to discharge w/patient and caregiver  -  Arrange for needed discharge resources and transportation as appropriate  - Identify discharge learning needs (meds, wound care, etc.)  - Arrange for interpretive services to assist at discharge as needed  - Refer to Case Management Department for coordinating discharge planning if the patient needs post-hospital services based on physician/advanced practitioner order or complex needs related to functional status, cognitive ability, or social support system  Outcome: Progressing     Problem: Knowledge Deficit  Goal: Patient/family/caregiver demonstrates understanding of disease process, treatment plan, medications, and discharge instructions  Description: Complete learning assessment and assess knowledge base.  Interventions:  - Provide teaching at level of understanding  - Provide teaching via preferred learning methods  Outcome: Progressing     Problem: CARDIOVASCULAR - ADULT  Goal: Maintains optimal cardiac output and hemodynamic stability  Description: INTERVENTIONS:  - Monitor I/O, vital signs and rhythm  - Monitor for S/S and trends of decreased cardiac output  - Administer and titrate ordered vasoactive medications to optimize hemodynamic stability  - Assess quality of pulses, skin color and temperature  - Assess for signs of decreased coronary artery perfusion  - Instruct patient to report change in severity of symptoms  Outcome: Progressing  Goal: Absence of cardiac dysrhythmias or at baseline rhythm  Description: INTERVENTIONS:  - Continuous cardiac monitoring, vital signs, obtain 12 lead EKG if ordered  - Administer antiarrhythmic and heart rate control medications as ordered  - Monitor electrolytes and administer replacement therapy as ordered  Outcome: Progressing     Problem: RESPIRATORY - ADULT  Goal: Achieves optimal ventilation and oxygenation  Description: INTERVENTIONS:  - Assess for changes in respiratory status  - Assess for changes in mentation and behavior  - Position to facilitate oxygenation  and minimize respiratory effort  - Oxygen administered by appropriate delivery if ordered  - Initiate smoking cessation education as indicated  - Encourage broncho-pulmonary hygiene including cough, deep breathe, Incentive Spirometry  - Assess the need for suctioning and aspirate as needed  - Assess and instruct to report SOB or any respiratory difficulty  - Respiratory Therapy support as indicated  Outcome: Progressing     Problem: Prexisting or High Potential for Compromised Skin Integrity  Goal: Skin integrity is maintained or improved  Description: INTERVENTIONS:  - Identify patients at risk for skin breakdown  - Assess and monitor skin integrity  - Assess and monitor nutrition and hydration status  - Monitor labs   - Assess for incontinence   - Turn and reposition patient  - Assist with mobility/ambulation  - Relieve pressure over bony prominences  - Avoid friction and shearing  - Provide appropriate hygiene as needed including keeping skin clean and dry  - Evaluate need for skin moisturizer/barrier cream  - Collaborate with interdisciplinary team   - Patient/family teaching  - Consider wound care consult   Outcome: Progressing

## 2024-11-18 NOTE — OCCUPATIONAL THERAPY NOTE
Occupational Therapy         Patient Name: Mattie Varela  Today's Date: 11/18/2024 11/18/24 1449   OT Last Visit   OT Visit Date 11/18/24   Note Type   Note type Cancelled Session;Evaluation   Cancel Reasons Refusal   Additional Comments OT consult received, chart reviewed. Attempted to see pt at 10:34 AM however pt declining 2* nausea and vomiting. Will defer and re-attempt as schedule permits.     Teresa Gutierrez

## 2024-11-18 NOTE — ASSESSMENT & PLAN NOTE
On as needed Percocet at home  C/w oxycodone 5mg prn for moderate pain & oxycodone 10mg for severe pain with acute fx

## 2024-11-18 NOTE — ASSESSMENT & PLAN NOTE
- XR 11/15/24: showing smooth linear lucencies coursing through the superior lateral aspect of the patella, nondisplaced fracture difficult to exclude   - pending CT of lower extremity   - orthopedics following

## 2024-11-19 PROBLEM — G47.00 INSOMNIA: Status: ACTIVE | Noted: 2024-11-19

## 2024-11-19 PROBLEM — R19.5 LOOSE STOOLS: Status: ACTIVE | Noted: 2024-11-19

## 2024-11-19 LAB
ANION GAP SERPL CALCULATED.3IONS-SCNC: 9 MMOL/L (ref 4–13)
BACTERIA UR CULT: NORMAL
BASOPHILS # BLD AUTO: 0.04 THOUSANDS/ÂΜL (ref 0–0.1)
BASOPHILS NFR BLD AUTO: 0 % (ref 0–1)
BUN SERPL-MCNC: 22 MG/DL (ref 5–25)
C DIFF TOX GENS STL QL NAA+PROBE: NEGATIVE
CALCIUM SERPL-MCNC: 9.1 MG/DL (ref 8.4–10.2)
CHLORIDE SERPL-SCNC: 99 MMOL/L (ref 96–108)
CO2 SERPL-SCNC: 29 MMOL/L (ref 21–32)
CREAT SERPL-MCNC: 1.23 MG/DL (ref 0.6–1.3)
EOSINOPHIL # BLD AUTO: 0.28 THOUSAND/ÂΜL (ref 0–0.61)
EOSINOPHIL NFR BLD AUTO: 3 % (ref 0–6)
ERYTHROCYTE [DISTWIDTH] IN BLOOD BY AUTOMATED COUNT: 14.1 % (ref 11.6–15.1)
GFR SERPL CREATININE-BSD FRML MDRD: 41 ML/MIN/1.73SQ M
GLUCOSE SERPL-MCNC: 119 MG/DL (ref 65–140)
GLUCOSE SERPL-MCNC: 149 MG/DL (ref 65–140)
GLUCOSE SERPL-MCNC: 150 MG/DL (ref 65–140)
GLUCOSE SERPL-MCNC: 152 MG/DL (ref 65–140)
GLUCOSE SERPL-MCNC: 183 MG/DL (ref 65–140)
HCT VFR BLD AUTO: 33.6 % (ref 34.8–46.1)
HGB BLD-MCNC: 10.7 G/DL (ref 11.5–15.4)
IMM GRANULOCYTES # BLD AUTO: 0.03 THOUSAND/UL (ref 0–0.2)
IMM GRANULOCYTES NFR BLD AUTO: 0 % (ref 0–2)
LYMPHOCYTES # BLD AUTO: 1.79 THOUSANDS/ÂΜL (ref 0.6–4.47)
LYMPHOCYTES NFR BLD AUTO: 20 % (ref 14–44)
MAGNESIUM SERPL-MCNC: 2 MG/DL (ref 1.9–2.7)
MCH RBC QN AUTO: 28.5 PG (ref 26.8–34.3)
MCHC RBC AUTO-ENTMCNC: 31.8 G/DL (ref 31.4–37.4)
MCV RBC AUTO: 90 FL (ref 82–98)
MONOCYTES # BLD AUTO: 1.1 THOUSAND/ÂΜL (ref 0.17–1.22)
MONOCYTES NFR BLD AUTO: 12 % (ref 4–12)
MRSA NOSE QL CULT: NORMAL
NEUTROPHILS # BLD AUTO: 5.78 THOUSANDS/ÂΜL (ref 1.85–7.62)
NEUTS SEG NFR BLD AUTO: 65 % (ref 43–75)
NRBC BLD AUTO-RTO: 0 /100 WBCS
PLATELET # BLD AUTO: 253 THOUSANDS/UL (ref 149–390)
PMV BLD AUTO: 10 FL (ref 8.9–12.7)
POTASSIUM SERPL-SCNC: 3.9 MMOL/L (ref 3.5–5.3)
RBC # BLD AUTO: 3.75 MILLION/UL (ref 3.81–5.12)
SODIUM SERPL-SCNC: 137 MMOL/L (ref 135–147)
WBC # BLD AUTO: 9.02 THOUSAND/UL (ref 4.31–10.16)

## 2024-11-19 PROCEDURE — 87493 C DIFF AMPLIFIED PROBE: CPT | Performed by: PHYSICIAN ASSISTANT

## 2024-11-19 PROCEDURE — 99233 SBSQ HOSP IP/OBS HIGH 50: CPT | Performed by: PHYSICIAN ASSISTANT

## 2024-11-19 PROCEDURE — 97530 THERAPEUTIC ACTIVITIES: CPT

## 2024-11-19 PROCEDURE — 85025 COMPLETE CBC W/AUTO DIFF WBC: CPT

## 2024-11-19 PROCEDURE — 82948 REAGENT STRIP/BLOOD GLUCOSE: CPT

## 2024-11-19 PROCEDURE — 80048 BASIC METABOLIC PNL TOTAL CA: CPT

## 2024-11-19 PROCEDURE — 99232 SBSQ HOSP IP/OBS MODERATE 35: CPT | Performed by: INTERNAL MEDICINE

## 2024-11-19 PROCEDURE — 83735 ASSAY OF MAGNESIUM: CPT

## 2024-11-19 PROCEDURE — 97163 PT EVAL HIGH COMPLEX 45 MIN: CPT

## 2024-11-19 RX ORDER — SACCHAROMYCES BOULARDII 250 MG
250 CAPSULE ORAL 2 TIMES DAILY
Status: DISCONTINUED | OUTPATIENT
Start: 2024-11-19 | End: 2024-11-19

## 2024-11-19 RX ORDER — LOPERAMIDE HYDROCHLORIDE 2 MG/1
2 CAPSULE ORAL 4 TIMES DAILY PRN
Status: DISCONTINUED | OUTPATIENT
Start: 2024-11-19 | End: 2024-11-25

## 2024-11-19 RX ORDER — ZOLPIDEM TARTRATE 5 MG/1
5 TABLET ORAL
Status: DISCONTINUED | OUTPATIENT
Start: 2024-11-19 | End: 2024-11-25

## 2024-11-19 RX ADMIN — ATORVASTATIN CALCIUM 40 MG: 40 TABLET, FILM COATED ORAL at 15:54

## 2024-11-19 RX ADMIN — LOPERAMIDE HYDROCHLORIDE 2 MG: 2 CAPSULE ORAL at 18:25

## 2024-11-19 RX ADMIN — BACLOFEN 10 MG: 10 TABLET ORAL at 21:59

## 2024-11-19 RX ADMIN — ALUMINUM HYDROXIDE, MAGNESIUM HYDROXIDE, AND DIMETHICONE 30 ML: 200; 20; 200 SUSPENSION ORAL at 16:14

## 2024-11-19 RX ADMIN — GUAIFENESIN 600 MG: 600 TABLET, EXTENDED RELEASE ORAL at 09:31

## 2024-11-19 RX ADMIN — METOPROLOL SUCCINATE 50 MG: 50 TABLET, EXTENDED RELEASE ORAL at 09:32

## 2024-11-19 RX ADMIN — INSULIN LISPRO 1 UNITS: 100 INJECTION, SOLUTION INTRAVENOUS; SUBCUTANEOUS at 12:04

## 2024-11-19 RX ADMIN — FUROSEMIDE 60 MG: 10 INJECTION, SOLUTION INTRAVENOUS at 09:31

## 2024-11-19 RX ADMIN — FERROUS SULFATE TAB 325 MG (65 MG ELEMENTAL FE) 325 MG: 325 (65 FE) TAB at 09:31

## 2024-11-19 RX ADMIN — FUROSEMIDE 60 MG: 10 INJECTION, SOLUTION INTRAVENOUS at 15:54

## 2024-11-19 RX ADMIN — LEVOTHYROXINE SODIUM 37.5 MCG: 75 TABLET ORAL at 05:36

## 2024-11-19 RX ADMIN — PANTOPRAZOLE SODIUM 40 MG: 40 TABLET, DELAYED RELEASE ORAL at 05:37

## 2024-11-19 RX ADMIN — LIDOCAINE 1 PATCH: 50 PATCH TOPICAL at 09:30

## 2024-11-19 RX ADMIN — SUCRALFATE 1 G: 1 TABLET ORAL at 05:37

## 2024-11-19 RX ADMIN — BACLOFEN 10 MG: 10 TABLET ORAL at 09:31

## 2024-11-19 RX ADMIN — LOPERAMIDE HYDROCHLORIDE 2 MG: 2 CAPSULE ORAL at 23:54

## 2024-11-19 RX ADMIN — OXYCODONE HYDROCHLORIDE 10 MG: 10 TABLET ORAL at 21:58

## 2024-11-19 RX ADMIN — OXYCODONE HYDROCHLORIDE 10 MG: 10 TABLET ORAL at 15:53

## 2024-11-19 RX ADMIN — OXYCODONE HYDROCHLORIDE 5 MG: 10 TABLET ORAL at 09:31

## 2024-11-19 RX ADMIN — ALUMINUM HYDROXIDE, MAGNESIUM HYDROXIDE, AND DIMETHICONE 30 ML: 200; 20; 200 SUSPENSION ORAL at 21:59

## 2024-11-19 RX ADMIN — ZOLPIDEM TARTRATE 5 MG: 5 TABLET, COATED ORAL at 21:59

## 2024-11-19 RX ADMIN — LATANOPROST 1 DROP: 50 SOLUTION OPHTHALMIC at 22:05

## 2024-11-19 RX ADMIN — GABAPENTIN 100 MG: 100 CAPSULE ORAL at 21:59

## 2024-11-19 RX ADMIN — ASPIRIN 81 MG CHEWABLE TABLET 81 MG: 81 TABLET CHEWABLE at 09:31

## 2024-11-19 RX ADMIN — MELATONIN 3 MG: 3 TAB ORAL at 00:14

## 2024-11-19 RX ADMIN — ENOXAPARIN SODIUM 40 MG: 40 INJECTION SUBCUTANEOUS at 09:30

## 2024-11-19 RX ADMIN — GUAIFENESIN 600 MG: 600 TABLET, EXTENDED RELEASE ORAL at 21:59

## 2024-11-19 RX ADMIN — SUCRALFATE 1 G: 1 TABLET ORAL at 15:54

## 2024-11-19 NOTE — PHYSICAL THERAPY NOTE
Physical Therapy Evaluation:    2 forms of pt ID verified:name,birthdate and pt ID gwen    Patient's Name: Mattie Varela    Admitting Diagnosis  Swelling [R60.9]  Knee pain [M25.569]  Fatigue [R53.83]  Dyspnea [R06.00]  CHF exacerbation ([facility]) [I50.9]    Problem List  Patient Active Problem List   Diagnosis    Failed back surgical syndrome    Degenerative lumbar spinal stenosis    Type 2 diabetes mellitus with other skin complications ([facility])    Chronic pain disorder    Dyspepsia    Glaucoma    Hypertension    Iron deficiency anemia secondary to inadequate dietary iron intake    Anxiety    Hypercholesterolemia    Hypothyroidism    Overactive bladder    S/P insertion of spinal cord stimulator    Mild intermittent asthma without complication    Prepyloric ulcer    Diarrhea    Cerebrovascular accident (CVA), unspecified mechanism ([facility])    Confusion with body shakes and blank stare    Anemia in stage 3a chronic kidney disease  ([facility])    Lower extremity edema    Chronic obstructive pulmonary disease, unspecified COPD type ([facility])    Abnormal urinalysis    History of lumbar surgery    Non obstructive CAD    SADAF (acute kidney injury) ([facility])    Exertional shortness of breath    Urinary retention    Left ventricular outflow obstruction    Obesity, Class I, BMI 30-34.9    Stage 3a chronic kidney disease ([facility])    Hypertensive urgency    AMS (altered mental status)    Encounter for examination for admission to nursing home    Moderate protein-calorie malnutrition (HCC)    s/p Medtronic loop recorder 3/2/2024    Acute on chronic diastolic congestive heart failure ([facility])    Leg pain, right    Constipation    Generalized edema    Detrusor sphincter dyssynergia    Chest pain    Acute low back pain    Acute kidney injury superimposed on chronic kidney disease  (HCC)    Chronic heart failure with preserved ejection fraction (HFpEF) ([facility])    Klebsiella cystitis    Pain in both lower extremities    Patella fracture       Past Medical  History  Past Medical History:   Diagnosis Date    Acid reflux     Acute kidney injury (HCC) 6/18/2022    Anemia     hx of iron-deficient    Anxiety     Arthritis     Asthma     last needed inhaler last year 2020    Basal cell carcinoma     upper lip    Chronic narcotic dependence (HCC)     Chronic pain     Colon polyp     Cystocele     Diabetes mellitus (HCC)     stable    Disease of thyroid gland     hypothyroidism    Diverticulosis     Dizziness     at times    Dysfunctional uterine bleeding     last assessed - [id number]    Dysphagia     Fibromyalgia     Gastric ulcer     Gastroparesis     History of colonic polyps     last assessed - [id number]    History of gastroesophageal reflux (GERD)     Hypercholesterolemia     Hyperlipidemia     Hypertension     Hyponatremia 1/13/2024    IBS (irritable bowel syndrome)     Pneumobilia 06/18/2022    Post laminectomy syndrome     S/P insertion of spinal cord stimulator 07/18/2018    s/p Medtronic loop recorder 3/2/2024 03/02/2024    Seasonal allergies     Shortness of breath     exertional    Spinal stenosis     Status post lumbar spinal fusion 03/16/2018    Stroke ([facility])     pt states slight stroke March 2022       Past Surgical History  Past Surgical History:   Procedure Laterality Date    APPENDECTOMY      BACK SURGERY      BREAST CYST EXCISION Left     CARDIAC CATHETERIZATION  11/14/2023    Procedure: Cardiac catheterization;  Surgeon: Randolph Holt MD;  [facility]: [facility];  Service: Cardiology    CARDIAC CATHETERIZATION N/A 11/14/2023    Procedure: Cardiac Coronary Angiogram;  Surgeon: Randolph Holt MD;  [facility]: [facility];  Service: Cardiology    CARDIAC ELECTROPHYSIOLOGY PROCEDURE N/A 3/2/2024    Procedure: Cardiac loop recorder implant;  Surgeon: Edu Fontaine MD;  Location: [facility]ARD[facility];  Service: Cardiology    CHOLECYSTECTOMY      COLONOSCOPY      ESOPHAGOGASTRODUODENOSCOPY N/A 09/28/2016    Procedure: ESOPHAGOGASTRODUODENOSCOPY  (EGD);  Surgeon: Mylene Moeller MD;  Location: [facility] GI LAB;  Service:     HERNIA REPAIR      HYSTERECTOMY      TTAH-BSO age 28    LAMINECTOMY      LUMBAR LAMINECTOMY      OOPHORECTOMY      age 28    MN ARTHRODESIS POSTERIOR/PSTLAT TQ 1NTRSPC THORACIC N/A 06/04/2018    Procedure: Reopening of lumbar incision for T12-L5 posterior instrumented fixation and fusion and T12-L4 posterior decompression;  Surgeon: Wood Rogel MD;  Location: [facility] MAIN [facility];  Service: Neurosurgery    MN COLONOSCOPY FLX DX W/COLLJ SPEC WHEN PFRMD N/A 03/02/2016    Procedure: EGD AND COLONOSCOPY;  Surgeon: Mylene Moeller MD;  Location: [facility] GI [facility];  Service: Gastroenterology    MN DILATION ESOPH UNGUIDED SOUND/BOUGIE 1/MULT PASS N/A 09/28/2016    Procedure: DILATATION ESOPHAGEAL;  Surgeon: Mylene Moeller MD;  Location: [facility] GI [facility];  Service: Gastroenterology    MN ESOPHAGOGASTRODUODENOSCOPY TRANSORAL DIAGNOSTIC N/A 07/18/2016    Procedure: ESOPHAGOGASTRODUODENOSCOPY (EGD);  Surgeon: Mylene Moeller MD;  Location: [facility];  Service: Gastroenterology    MN INSJ/RPLCMT SPINAL NPG/RCVR POCKET CRTJ&CONNJ Left 06/04/2018    Procedure: removal of left buttock implantable pulse generator and placement of new  implantable pulse generator;  Surgeon: Wood Rogel MD;  Location: [facility];  Service: Neurosurgery          11/19/24 1000   PT Last Visit   PT Visit Date 11/19/24   Note Type   Note type Evaluation  (additional PT Tx session following PT IE)   Pain Assessment   Pain Assessment Tool 0-10   Pain Score 10 - Worst Possible Pain   Pain Location/Orientation Orientation: Left;Location: Back;Location: Knee;Location: Leg  (pt reports (+)spinal stenosis)   Hospital Pain Intervention(s) Repositioned;Ambulation/increased activity;Elevated;Emotional support;Rest   Restrictions/Precautions   Weight Bearing Precautions Per Order Yes   LLE Weight Bearing Per Order WBAT   Braces or Orthoses Knee immobilizer  (LLE KI)   Other Precautions Contact/isolation;Chair Alarm;Bed  "Alarm;Multiple lines;Fall Risk;Pain   Home Living   Type of Home Assisted living  ([facility])   Home Layout One level   Home Equipment Walker   Additional Comments Pt resides at [facility], use of RW to ambulate, reports (I) with ADLs, needs A for IADLs, [facility] provides A for medication and meals. Pt reports able to ambulate to dining hood with use of RW daily   Prior Function   Level of [place] Independent with ADLs;Independent with functional mobility;Needs assistance with IADLS   Lives With Facility staff   Receives Help From Personal care attendant   IADLs Family/Friend/Other provides transportation;Family/Friend/Other provides meals;Family/Friend/Other provides medication management   Falls in the last 6 months 1 to 4   General   Additional Pertinent History L knee swelling, h/o recent fall with L patella fracture, CHF exacerbation   Family/Caregiver Present No   Cognition   Overall Cognitive Status WFL   Arousal/Participation Cooperative   Orientation Level Oriented X4   Following Commands Follows one step commands with increased time or repetition   Comments 2* pain   Subjective   Subjective Pt supine in bed resting comfortably with use of bedpan. Pt currently is incontinent of bowel and reports \"I cant make it stop\". Pt willing and agreeable to work with PT and to participate in therapy intervention; \"I just dont want my back to hurt. Go slow please\".   RLE Assessment   RLE Assessment   (at least 3+/5 grossly throughout)   LLE Assessment   LLE Assessment   (at least 2+/5 grossly throughout)   Coordination   Movements are Fluid and Coordinated 0   Coordination and Movement Description inc rigidity during B rolling due to back pain, slow mobility and movement, pain   Sensation WFL   Light Touch   RLE Light Touch Grossly intact   LLE Light Touch Grossly intact   Bed Mobility   Rolling R 2  Maximal assistance   Additional items Assist x 1;Bedrails;Increased time required;Verbal cues;LE management   Rolling L 2  " Maximal assistance   Additional items Assist x 1;Bedrails;Increased time required;Verbal cues;LE management   Supine to Sit 2  Maximal assistance   Additional items Assist x 2;HOB elevated;Bedrails;Increased time required;Verbal cues;LE management   Additional Comments dependent donning of LLE knee immobilizer supine in bed prior to mobility   Transfers   Sit to Stand 3  Moderate assistance   Additional items Assist x 2;Bedrails;Increased time required;Verbal cues   Stand to Sit 3  Moderate assistance   Additional items Assist x 2;Bedrails;Increased time required;Verbal cues   Ambulation/Elevation   Gait pattern Poor UE support;Improper Weight shift;Antalgic;Narrow RUBIN;Forward Flexion;Decreased L stance;Inconsistent jorge;Foward flexed;Short stride;Ataxia;Step to;Excessively slow   Gait Assistance 3  Moderate assist   Additional items Assist x 1;Verbal cues;Tactile cues   Assistive Device Rolling walker   Distance 7 sidesteps towards HOB with use of RW; returned pt to supine 2* incontinent of bowel   Balance   Static Sitting Fair   Dynamic Sitting   (zero)   Static Standing Poor   Dynamic Standing Poor   Ambulatory Poor   Endurance Deficit   Endurance Deficit Yes   Endurance Deficit Description incontinent of bowel, weakness, pain, dec activity tolerance   Activity Tolerance   Activity Tolerance Patient limited by fatigue;Patient limited by pain  (fair)   Nurse Made Aware yes   Assessment   Prognosis Fair   Problem List Decreased strength;Decreased endurance;Impaired balance;Decreased mobility;Obesity;Decreased skin integrity;Orthopedic restrictions;Pain  (WBAT LLE with KI)   Assessment Pt is a 80 yo female admitted [facility] 2* L knee swelling, s/p recent fall with L patella fracture,r/o CDiff,CHF exacerbation, SOB. Pt lives at [facility] needing A for IADLs, reports x1 recent fall,use of RW to ambulate,(I) with ADLs. Pt currently is not at functional mobility baseline, needs A for mobility with use of LLE KI and RW,pain,  WBAT LLE with KI,r/o Cdiff,contact precautions isolation,multiple lines. Pt demonstrates maximal to moderate deficits during functional mobility and gait including dec endurance, pain, dec BLE strength, dec balance and needs maxAx1 for B rolling and maxAX2 bed mobility, modAX2 for TT and modAX1 for GT with use of RW. Limited mobility and ambulation distance 2* incontinent of stool and pain. Pt would cont to benefit from skilled inpt PT services to maximize functional independence and to dec caregiver burden upon being DC from the hospital.   Goals   Patient Goals to move slowly   STG Expiration Date 11/29/24   Short Term Goal #1 in 7-10 days: (1) Pt will be able to ambulate greater than 80 feet with use of RW and chair follow as needed on various surfaces needing min level of A in order to A pt to return to PLOF, (2) activity tolerance:45 mins/45mins, (3) pt will be able to perform sit to stand transfers needing min AX1 to and from various surfaces consistently in order to return to PLOF, (4) pt will be able to perform BM needing modAx1 to A pt to return to PLOF, (5) AAROM with LLE and AROM RLE therapeutic ex HEP in various positions to A pt to inc balance,strength,mobility,endurance and to A to dec pain, (6) inc balance 1/2 grade in order to dec fall risk,  (7) cont to provide pt and pt family education for safe D/C planning, (8) inc BLE strength 1/2 to 1 full grade in order to A pt to inc balance,strength,mobility,endurance and to A to dec pain   PT Treatment Day 1  (additional PT tx session following PT IE)   Plan   Treatment/Interventions Functional transfer training;LE strengthening/ROM;Therapeutic exercise;Endurance training;Patient/family training;Equipment eval/education;Bed mobility;Gait training;Continued evaluation;Spoke to nursing   PT Frequency 3-5x/wk   Discharge Recommendation   Rehab Resource Intensity Level, PT II (Moderate Resource Intensity)   Equipment Recommended Walker  (pt owns RW at home for  "use)   AM-PAC Basic Mobility Inpatient   Turning in Flat Bed Without Bedrails 1   Lying on Back to Sitting on Edge of Flat Bed Without Bedrails 1   Moving Bed to Chair 2   Standing Up From Chair Using Arms 2   Walk in Room 2   Climb 3-5 Stairs With Railing 1   Basic Mobility Inpatient Raw Score 9   [facility] Highest Level Of Mobility   -[facility] Goal 3: Sit at edge of bed   -[facility] Achieved 5: Stand (1 or more minutes)       Time In:1000  Time Out:1015  Total Time: 15 mins      S:  Pt sitting at EOB post mobility with fatigue and SOB. Pt request to sit at EOB \"for a little bit\" before returning to supine. Returned pt to supine due to being incontinent of stool and pain  O:  Pt cont to be incontinent of stool during mobility and at rest. Pt needed maxAX2 for sit->supine with maxAx1 for B rolling. Pt dependent for removal of soiled bed linens and brief, dependent for pericare. Nursing made aware.  A:  Pt able to perform B rolling maxAX1 and sit->supine maxAX2. Limited activity tolerance due to pain and incontinence of stool. Pt fatigues easily with inc L knee and chronic back pain. Pt would cont to benefit from skilled inpt PT services to maximize functional independence and to dec caregiver burden upon being DC from the hospital  P:  cont skilled inpt PT services 3-5xs per week for education, mobility, strength and endurance, balance    Lorraine Dixon, PT       @Lorraine Dixon PT, DPT@   "

## 2024-11-19 NOTE — CASE MANAGEMENT
Case Management Discharge Planning Note    Patient name Mattie Varela  Location [facility] /E4 -* MRN [id number]  : 1945 Date 2024       Current Admission Date: 2024  Current Admission Diagnosis:Acute on chronic diastolic congestive heart failure (HCC)   Patient Active Problem List    Diagnosis Date Noted Date Diagnosed    Patella fracture 2024     Pain in both lower extremities 10/30/2024     Chronic heart failure with preserved ejection fraction (HFpEF) ([facility]) 10/25/2024     Klebsiella cystitis 10/25/2024     Acute kidney injury superimposed on chronic kidney disease  ([facility]) 10/18/2024     Acute low back pain 10/13/2024     Chest pain 10/11/2024     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     Generalized edema 2024     Constipation 2024     Leg pain, right 2024     Acute on chronic diastolic congestive heart failure ([facility]) 2024     s/p Medtronic loop recorder 3/2/2024 2024     Moderate protein-calorie malnutrition (HCC) 2024     Hypertensive urgency 2024     AMS (altered mental status) 2024     Encounter for examination for admission to nursing home 2024     Stage 3a chronic kidney disease ([facility]) 01/10/2024     Left ventricular outflow obstruction 2024     Obesity, Class I, BMI 30-34.9 2024     Urinary retention 2023     SADAF (acute kidney injury) ([facility]) 2023     Exertional shortness of breath 2023     Non obstructive CAD 11/15/2023     History of lumbar surgery 11/10/2023     Abnormal urinalysis 2023     Chronic obstructive pulmonary disease, unspecified COPD type ([facility]) 2023     Confusion with body shakes and blank stare 2023     Anemia in stage 3a chronic kidney disease  ([facility]) 2023     Lower extremity edema 2023     Cerebrovascular accident (CVA), unspecified mechanism ([facility]) 2022     Prepyloric ulcer 2021     Diarrhea 2021     Mild intermittent  asthma without complication 03/11/2020     S/P insertion of spinal cord stimulator 07/18/2018     Iron deficiency anemia secondary to inadequate dietary iron intake 06/19/2018     Type 2 diabetes mellitus with other skin complications (HCC)      Failed back surgical syndrome 03/16/2018     Hypothyroidism 11/20/2017     Degenerative lumbar spinal stenosis 01/25/2017     Glaucoma 01/06/2017     Overactive bladder 08/04/2016     Dyspepsia 02/09/2016     Hypercholesterolemia 02/09/2016     Anxiety 02/09/2015     Chronic pain disorder 12/18/2013     Hypertension 06/12/2013       LOS (days): 2  Geometric Mean LOS (GMLOS) (days): 3.9  Days to GMLOS:1.9     OBJECTIVE:  Risk of Unplanned Readmission Score: 38.3         Current admission status: Inpatient   Preferred Pharmacy:   [facility] [place] - [address]  [address]  [address] [place] [address]  Phone: [phone] Fax: [phone]    [facility] [place] - [address]  [facility],  [address]  [facility],  First Floor [facility] [address]  Phone: [phone] Fax: [phone]    [facility] [place], [place] - [address]  [address]  [place] [address]  Phone: [phone] Fax: [phone]    Primary Care Provider: NANY Pollock    Primary Insurance: [employer] [facility]  Secondary Insurance:     DISCHARGE DETAILS:                                                                                                 Additional Comments: CM did price check with Jardiance 10 mg daily.  [employer] contacted and they use [facility]-  cost to the pt will be $25/dose.  Provider was notified.

## 2024-11-19 NOTE — PROGRESS NOTES
Progress Note - Hospitalist   Name: Mattie Varela 79 y.o. female I MRN: [id number]  Unit/Bed#: E4 -01 I Date of Admission: 11/17/2024   Date of Service: 11/19/2024 I Hospital Day: 2    Assessment & Plan  Acute on chronic diastolic congestive heart failure (HCC)  Wt Readings from Last 3 Encounters:   11/19/24 83.9 kg (184 lb 15.5 oz)   10/18/24 74.3 kg (163 lb 12.8 oz)   10/02/24 85.5 kg (188 lb 9.6 oz)   79 year old female presented with shortness of breath, lower extremity edema, volume overload, and weight gain secondary to acute on chronic diastolic congestive heart failure.  Takes torsemide 30 mg daily  Receiving IV lasix 40 mg bid --> increased to 60 mg BID  Continue metoprolol 50 mg twice daily  Daily weights, I&Os, Na/fluid restriction  Down about 4.6 L, admission weight 194 pounds, current weight 184 pounds  Cardiology price checking Jardiance  Ongoing recommendations cardiology  Type 2 diabetes mellitus with other skin complications (HCC)  Lab Results   Component Value Date    HGBA1C 7.4 (H) 10/12/2024     Recent Labs     11/18/24  1631 11/18/24  2100 11/19/24  0740 11/19/24  1109   POCGLU 236* 158* 149* 183*   Holding Metformin  SSI & acu-checks ac/hs  Patella fracture  S/p recent fall at [facility]  CT left knee showing what appears to be a fracture of her bipartite patella   Seen by orthopedic team  Knee immobilizer x 2 weeks with WBAT to LLE   Office follow-up in 2 weeks with repeat x-rays   Urinary retention  Had overactive bladder in the past and received Botox.   Continues to have urinary retention requiring ahn catheter placement. For outpatient voiding trial.  Void trial should occur with urology in the outpt setting  UA >100,000  cfu GNR. Was recently treated for Klebsiella cystitis.   Without symptoms, fever or leukocytosis & chronic ahn, monitor off abx  Chronic pain disorder  On as needed Percocet at home  C/w oxycodone 5mg prn for moderate pain & oxycodone 10mg for severe pain with acute  fx  Stage 3a chronic kidney disease (HCC)  Lab Results   Component Value Date    EGFR 41 11/19/2024    EGFR 37 11/18/2024    EGFR 41 11/17/2024    CREATININE 1.23 11/19/2024    CREATININE 1.33 (H) 11/18/2024    CREATININE 1.23 11/17/2024   Baseline around 1.3   Monitor closely with diuresis   Hypertension  Continue Metoprolol 50 mg twice daily  Hypothyroidism  Continue levothyroxine  Loose stools  Had large bowel movement yesterday and has had frequent bouts of stool after this  C. difficile checked and was negative  Consider GI consult if stools persist  Insomnia  Was placed on melatonin however reports this is ineffective  Requesting additional sleep aid  Has trialed Ambien in the past and this worked well for her    VTE Pharmacologic Prophylaxis:   lovenox    Mobility:   Basic Mobility Inpatient Raw Score: 9  -[facility] Goal: 3: Sit at edge of bed  -HL Achieved: 5: Stand (1 or more minutes)    Patient Centered Rounds: I performed bedside rounds with nursing staff today.   Discussions with Specialists or Other Care Team Provider: nursing    Education and Discussions with Family / Patient: patient, daughter via telephone    Current Length of Stay: 2 day(s)  Current Patient Status: Inpatient   Certification Statement: With need for continued inpatient stay for ongoing diuresis    Code Status: Level 1 - Full Code    Subjective   Resting in bed.  Reports she had some difficulty with her breathing last night.  Has Bateman in place.  Reports difficulty with drainage prior to arrival however is now draining without difficulties.  Reports her goal is to go back to assisted living facility rather than rehab.    Updated daughter via telephone. Discussed volume status and goal of not going back to rehab.She prefers to go back to her assisted living.    Objective :  Temp:  [97.5 °F (36.4 °C)-98.6 °F (37 °C)] 98.5 °F (36.9 °C)  HR:  [68-96] 96  BP: (103-133)/(55-71) 126/65  Resp:  [18] 18  SpO2:  [91 %-98 %] 95 %  O2 Device: None  (Room air)    Body mass index is 36.12 kg/m².     Input and Output Summary (last 24 hours):     Intake/Output Summary (Last 24 hours) at 11/19/2024 1247  Last data filed at 11/19/2024 1107  Gross per 24 hour   Intake 360 ml   Output 2850 ml   Net -2490 ml       Physical Exam  Vitals and nursing note reviewed.   Constitutional:       General: She is not in acute distress.     Appearance: She is normal weight. She is not ill-appearing, toxic-appearing or diaphoretic.   HENT:      Head: Normocephalic and atraumatic.   Eyes:      General: No scleral icterus.  Cardiovascular:      Rate and Rhythm: Normal rate and regular rhythm.   Pulmonary:      Effort: Pulmonary effort is normal. No respiratory distress.      Breath sounds: Normal breath sounds. No stridor. No wheezing or rhonchi.   Abdominal:      General: Bowel sounds are normal. There is no distension.      Palpations: Abdomen is soft. There is no mass.      Tenderness: There is no abdominal tenderness.      Hernia: No hernia is present.   Musculoskeletal:         General: No swelling.      Cervical back: Neck supple.   Skin:     General: Skin is warm and dry.   Neurological:      Mental Status: She is alert and oriented to person, place, and time. Mental status is at baseline.   Psychiatric:         Mood and Affect: Mood normal.         Behavior: Behavior normal.       Lines/Drains:  Lines/Drains/Airways       Active Status       Name Placement date Placement time Site Days    Urethral Catheter 16 Fr. 10/26/24  1311  --  24                  Urinary Catheter:  Goal for removal: N/A - Chronic Bateman                 Lab Results: I have reviewed the following results:   Results from last 7 days   Lab Units 11/19/24  0551   WBC Thousand/uL 9.02   HEMOGLOBIN g/dL 10.7*   HEMATOCRIT % 33.6*   PLATELETS Thousands/uL 253   SEGS PCT % 65   LYMPHO PCT % 20   MONO PCT % 12   EOS PCT % 3     Results from last 7 days   Lab Units 11/19/24  0551 11/18/24  0526   SODIUM mmol/L 137  138   POTASSIUM mmol/L 3.9 4.2   CHLORIDE mmol/L 99 100   CO2 mmol/L 29 30   BUN mg/dL 22 28*   CREATININE mg/dL 1.23 1.33*   ANION GAP mmol/L 9 8   CALCIUM mg/dL 9.1 8.8   ALBUMIN g/dL  --  3.0*   TOTAL BILIRUBIN mg/dL  --  0.29   ALK PHOS U/L  --  82   ALT U/L  --  5*   AST U/L  --  11*   GLUCOSE RANDOM mg/dL 150* 141*         Results from last 7 days   Lab Units 11/19/24  1109 11/19/24  0740 11/18/24  2100 11/18/24  1631 11/18/24  1129 11/18/24  0740 11/17/24  2112 11/17/24  1633   POC GLUCOSE mg/dl 183* 149* 158* 236* 267* 138 200* 113               Recent Cultures (last 7 days):   Results from last 7 days   Lab Units 11/19/24  0011 11/17/24  1002   URINE CULTURE   --  >100,000 cfu/ml   C DIFF TOXIN B BY PCR  Negative  --              Last 24 Hours Medication List:     Current Facility-Administered Medications:     acetaminophen (TYLENOL) tablet 650 mg, Q6H PRN    albuterol (PROVENTIL HFA,VENTOLIN HFA) inhaler 2 puff, Q6H PRN    aluminum-magnesium hydroxide-simethicone (MAALOX) oral suspension 30 mL, Q4H PRN    aspirin chewable tablet 81 mg, Daily    atorvastatin (LIPITOR) tablet 40 mg, Daily With Dinner    baclofen tablet 10 mg, BID    enoxaparin (LOVENOX) subcutaneous injection 40 mg, Daily    ferrous sulfate tablet 325 mg, Daily With Breakfast    furosemide (LASIX) injection 60 mg, BID (diuretic)    gabapentin (NEURONTIN) capsule 100 mg, HS    guaiFENesin (MUCINEX) 12 hr tablet 600 mg, Q12H ARY    insulin lispro (HumALOG/ADMELOG) 100 units/mL subcutaneous injection 1-6 Units, TID AC **AND** Fingerstick Glucose (POCT), TID AC    latanoprost (XALATAN) 0.005 % ophthalmic solution 1 drop, HS    levothyroxine tablet 37.5 mcg, Early Morning    lidocaine (LIDODERM) 5 % patch 1 patch, Daily    meclizine (ANTIVERT) tablet 12.5 mg, Daily PRN    melatonin tablet 3 mg, HS PRN    metoprolol succinate (TOPROL-XL) 24 hr tablet 50 mg, Q12H ARY    Milnacipran HCl TABS 100 mg, Daily    oxyCODONE (ROXICODONE) immediate release  tablet 10 mg, Q6H PRN    oxyCODONE (ROXICODONE) IR tablet 5 mg, Q6H PRN    pantoprazole (PROTONIX) EC tablet 40 mg, Early Morning    sucralfate (CARAFATE) tablet 1 g, BID AC    Administrative Statements   Today, Patient Was Seen By: Susannah Mcclain PA-C      **Please Note: This note may have been constructed using a voice recognition system.**

## 2024-11-19 NOTE — ASSESSMENT & PLAN NOTE
Had overactive bladder in the past and received Botox.   Continues to have urinary retention requiring ahn catheter placement. For outpatient voiding trial.  Void trial should occur with urology in the outpt setting  UA >100,000  cfu GNR. Was recently treated for Klebsiella cystitis.   Without symptoms, fever or leukocytosis & chronic ahn, monitor off abx

## 2024-11-19 NOTE — PLAN OF CARE
Problem: PHYSICAL THERAPY ADULT  Goal: Performs mobility at highest level of function for planned discharge setting.  See evaluation for individualized goals.  Description: Treatment/Interventions: Functional transfer training, LE strengthening/ROM, Therapeutic exercise, Endurance training, Patient/family training, Equipment eval/education, Bed mobility, Gait training, Continued evaluation, Spoke to nursing  Equipment Recommended: Walker (pt owns RW at home for use)       See flowsheet documentation for full assessment, interventions and recommendations.  Note: Prognosis: Fair  Problem List: Decreased strength, Decreased endurance, Impaired balance, Decreased mobility, Obesity, Decreased skin integrity, Orthopedic restrictions, Pain (WBAT LLE with KI)  Assessment: Pt is a 80 yo female admitted to [facility] 2* L knee swelling, s/p recent fall with L patella fracture,r/o CDiff,CHF exacerbation, SOB. Pt lives at [facility] needing A for IADLs, reports x1 recent fall,use of RW to ambulate,(I) with ADLs. Pt currently is not at functional mobility baseline, needs A for mobility with use of LLE KI and RW,pain, WBAT LLE with KI,r/o Cdiff,contact precautions isolation,multiple lines. Pt demonstrates maximal to moderate deficits during functional mobility and gait including dec endurance, pain, dec BLE strength, dec balance and needs maxAx1 for B rolling and maxAX2 bed mobility, modAX2 for TT and modAX1 for GT with use of RW. Limited mobility and ambulation distance 2* incontinent of stool and pain. Pt would cont to benefit from skilled inpt PT services to maximize functional independence and to dec caregiver burden upon being DC from the hospital.        Rehab Resource Intensity Level, PT: II (Moderate Resource Intensity)    See flowsheet documentation for full assessment.

## 2024-11-19 NOTE — ASSESSMENT & PLAN NOTE
Lab Results   Component Value Date    EGFR 41 11/19/2024    EGFR 37 11/18/2024    EGFR 41 11/17/2024    CREATININE 1.23 11/19/2024    CREATININE 1.33 (H) 11/18/2024    CREATININE 1.23 11/17/2024   Baseline around 1.3   Monitor closely with diuresis

## 2024-11-19 NOTE — UTILIZATION REVIEW
NOTIFICATION OF INPATIENT ADMISSION   AUTHORIZATION REQUEST   SERVICING FACILITY:   [address] [place] [address]  Address: [address] [place] [address]  Tax ID: [id number]  NPI: [id number] ATTENDING PROVIDER:  Attending Name and NPI#: Jim Mukherjee [[id number]]  Address: [address] [place] [address]  Phone: [phone]   ADMISSION INFORMATION:  Place of Service: Inpatient [address] [facility]  Place of Service Code: 21  Inpatient Admission Date/Time: 11/26/23  3:23 PM  Discharge Date/Time: No discharge date for patient encounter. Admitting Diagnosis Code/Description:  Hyperkalemia [E87.5]  Hyponatremia [E87.1]  CVA (cerebral vascular accident) ([facility]) [I63.9]  SADAF (acute kidney injury) ([facility]) [N17.9]  Stroke-like symptoms [R29.90]     UTILIZATION REVIEW CONTACT:  Nahid Neves Utilization   Network Utilization Review Department  Phone: [phone]  Fax: [phone]  Email: [email or web address]  Contact for approvals/pending authorizations, clinical reviews, and discharge. PHYSICIAN ADVISORY SERVICES:  Medical Necessity Denial & Rlyc-fe-Hkjd Review  Phone: [phone]  Fax: [phone]  Email: [email or web address]     DISCHARGE SUPPORT TEAM:  For Patients Discharge Needs & Updates  Phone: [phone] opt. 2 Fax: [phone]  Email: [email or web address] You can access the FollowMyHealth Patient Portal offered by [facility] by registering at the following website: [email or web address]. By joining [facility]’s FollowMyHealth portal, you will also be able to view your health information using other applications (apps) compatible with our system.

## 2024-11-19 NOTE — ASSESSMENT & PLAN NOTE
- TTE 05/23/24: LVEF 70%, grade I diastolic dysfunction, mild LVOT obstruction, moderate annular calcification, mild TR   - BNP 11/17/24: 264 (BNP 05/22/24 was 302)  - CXR 11/17/24: no acute cardiopulmonary disease   - about   - Neurohormonal Blockade:   -- beta blocker: Toprol-XL 50 mg BID  -- ACE/ARB/ARNi: none  -- aldosterone antagonist: none   -- SGLT2: none   -- diuretic: torsemide 30 mg daily  -- inpatient diuretic: IV lasix 60 mg BID

## 2024-11-19 NOTE — ASSESSMENT & PLAN NOTE
S/p recent fall at [facility]  CT left knee showing what appears to be a fracture of her bipartite patella   Seen by orthopedic team  Knee immobilizer x 2 weeks with WBAT to LLE   Office follow-up in 2 weeks with repeat x-rays

## 2024-11-19 NOTE — RESTORATIVE TECHNICIAN NOTE
Restorative Technician Note      Patient Name: Mattie Suresholiver     Restorative Tech Visit Date: 11/19/24  Note Type: Mobility  Patient Position Upon Consult: Supine  Activity Performed: Ambulated  Assistive Device: Roller walker  Patient Position at End of Consult: All needs within reach; Supine; Bed/Chair alarm activated          ns

## 2024-11-19 NOTE — ASSESSMENT & PLAN NOTE
Wt Readings from Last 3 Encounters:   11/19/24 83.9 kg (184 lb 15.5 oz)   10/18/24 74.3 kg (163 lb 12.8 oz)   10/02/24 85.5 kg (188 lb 9.6 oz)   79 year old female presented with shortness of breath, lower extremity edema, volume overload, and weight gain secondary to acute on chronic diastolic congestive heart failure.  Takes torsemide 30 mg daily  Receiving IV lasix 40 mg bid --> increased to 60 mg BID  Continue metoprolol 50 mg twice daily  Daily weights, I&Os, Na/fluid restriction  Down about 4.6 L, admission weight 194 pounds, current weight 184 pounds  Cardiology price checking Jardiance  Ongoing recommendations cardiology

## 2024-11-19 NOTE — ASSESSMENT & PLAN NOTE
Had large bowel movement yesterday and has had frequent bouts of stool after this  C. difficile checked and was negative  Consider GI consult if stools persist

## 2024-11-19 NOTE — ASSESSMENT & PLAN NOTE
Lab Results   Component Value Date    HGBA1C 7.4 (H) 10/12/2024     Recent Labs     11/18/24  1631 11/18/24  2100 11/19/24  0740 11/19/24  1109   POCGLU 236* 158* 149* 183*   Holding Metformin  SSI & acu-checks ac/hs

## 2024-11-19 NOTE — PROGRESS NOTES
Progress Note - Cardiology   Name: Mattie Varela 79 y.o. female I MRN: [id number]  Unit/Bed#: E4 -01 I Date of Admission: 11/17/2024   Date of Service: 11/19/2024 I Hospital Day: 2    Assessment & Plan  Acute on chronic diastolic congestive heart failure (HCC)   - TTE 05/23/24: LVEF 70%, grade I diastolic dysfunction, mild LVOT obstruction, moderate annular calcification, mild TR   - BNP 11/17/24: 264 (BNP 05/22/24 was 302)  - CXR 11/17/24: no acute cardiopulmonary disease   - about   - Neurohormonal Blockade:   -- beta blocker: Toprol-XL 50 mg BID  -- ACE/ARB/ARNi: none  -- aldosterone antagonist: none   -- SGLT2: none   -- diuretic: torsemide 30 mg daily  -- inpatient diuretic: IV lasix 60 mg BID  Hypertension  - BP elevated on admission with systolic 115-162 mmHg  - home regimen: Toprol-XL 50 mg BID  Type 2 diabetes mellitus with other skin complications ([facility])  - A1c 10/12/24: 7.4  - on metformin   Stage 3a chronic kidney disease ([facility])  - follows with outpatient nephrology  - baseline creatinine ~1.3-1.4, GFR 37-42  Patella fracture  - XR 11/15/24: showing smooth linear lucencies coursing through the superior lateral aspect of the patella, nondisplaced fracture difficult to exclude   - pending CT of lower extremity   - orthopedics following  Urinary retention  - history of urinary retention with spine stimulator in place   - continues to have urinary retention requiring Bateman catheter   - need outpatient voiding trial  Hypothyroidism  - continue levothyroxine   Chronic pain disorder    Plan:   She continues to diuresis well on current dose of IV diuretics. BP and HR remain stable. Labs show Na 137, K 3.9, Cr 1.23 and GFR 41. Down 3 lbs (bed scale) since yesterday. -1.250 L of urine output.  - continue IV lasix 60 mg BID   - strict I/O' sand daily weights   - price checked jardiance $760--> will message case management to inquire about insurance to see if she is covered   - continue to monitor kidney and  "electrolyte function with goal of K > 4 and Mg > 2  - continue ASA 81 mg daily, atorvastatin 40 mg daily, and Toprol-Xl 50 mg daily  - C.diff PCR pending     Subjective:  Pt seen and examined at beside. No acute events overnight. States she feels \"a little off today\" because of frequent diarrhea. States her breathing is improved compared to yesterday. Denies anginal chest pain, palpitations, and flutters in her chest.     Vitals:  Vitals:    11/18/24 0554 11/19/24 0546   Weight: 85 kg (187 lb 6.3 oz) 83.9 kg (184 lb 15.5 oz)   ,  Vitals:    11/18/24 2310 11/19/24 0307 11/19/24 0546 11/19/24 0737   BP: 120/62 120/57  115/55   BP Location: Right arm Left arm  Right arm   Pulse: 93 84  91   Resp: 18 18 18   Temp: 98.6 °F (37 °C) 97.5 °F (36.4 °C)  98.1 °F (36.7 °C)   TempSrc: Temporal Temporal  Temporal   SpO2: 91% 94%  94%   Weight:   83.9 kg (184 lb 15.5 oz)    Height:           Exam:  Physical Exam  Vitals and nursing note reviewed.   Constitutional:       General: She is not in acute distress.     Appearance: Normal appearance. She is not ill-appearing.   HENT:      Head: Normocephalic and atraumatic.      Nose: Nose normal.      Mouth/Throat:      Mouth: Mucous membranes are moist.   Eyes:      General: No scleral icterus.  Neck:      Vascular: No JVD.   Cardiovascular:      Rate and Rhythm: Normal rate and regular rhythm.      Pulses:           Radial pulses are 2+ on the right side and 2+ on the left side.      Heart sounds: Murmur heard.      Systolic murmur is present with a grade of 2/6.   Pulmonary:      Effort: Pulmonary effort is normal. No respiratory distress.      Breath sounds: Normal breath sounds. No stridor. No wheezing, rhonchi or rales.   Abdominal:      General: Abdomen is flat.   Musculoskeletal:         General: No swelling. Normal range of motion.      Cervical back: Normal range of motion.      Right lower leg: No edema.      Left lower leg: No edema.   Skin:     General: Skin is warm and " dry.      Capillary Refill: Capillary refill takes less than 2 seconds.   Neurological:      Mental Status: She is alert. Mental status is at baseline.   Psychiatric:         Thought Content: Thought content normal.        Medications:    Current Facility-Administered Medications:     acetaminophen (TYLENOL) tablet 650 mg, 650 mg, Oral, Q6H PRN, Paco Carmona MD    albuterol (PROVENTIL HFA,VENTOLIN HFA) inhaler 2 puff, 2 puff, Inhalation, Q6H PRN, Paco Carmona MD    aluminum-magnesium hydroxide-simethicone (MAALOX) oral suspension 30 mL, 30 mL, Oral, Q4H PRN, Krystina Preston PA-C, 30 mL at 11/18/24 1230    aspirin chewable tablet 81 mg, 81 mg, Oral, Daily, Paco Carmona MD, 81 mg at 11/19/24 0931    atorvastatin (LIPITOR) tablet 40 mg, 40 mg, Oral, Daily With Dinner, Paco Carmona MD, 40 mg at 11/18/24 1719    baclofen tablet 10 mg, 10 mg, Oral, BID, Paco Carmona MD, 10 mg at 11/19/24 0931    enoxaparin (LOVENOX) subcutaneous injection 40 mg, 40 mg, Subcutaneous, Daily, Paco Carmona MD, 40 mg at 11/19/24 0930    ferrous sulfate tablet 325 mg, 325 mg, Oral, Daily With Breakfast, Paco Carmona MD, 325 mg at 11/19/24 0931    furosemide (LASIX) injection 60 mg, 60 mg, Intravenous, BID (diuretic), Paco Boyer MD, 60 mg at 11/19/24 0931    gabapentin (NEURONTIN) capsule 100 mg, 100 mg, Oral, HS, Krystina Preston PA-C, 100 mg at 11/18/24 2105    guaiFENesin (MUCINEX) 12 hr tablet 600 mg, 600 mg, Oral, Q12H ARY, Bi Luke PA-C, 600 mg at 11/19/24 0931    insulin lispro (HumALOG/ADMELOG) 100 units/mL subcutaneous injection 1-6 Units, 1-6 Units, Subcutaneous, TID AC, 3 Units at 11/18/24 1721 **AND** Fingerstick Glucose (POCT), , , TID AC, Paco Carmona MD    latanoprost (XALATAN) 0.005 % ophthalmic solution 1 drop, 1 drop, Both Eyes, HS, Paco Carmona MD, 1 drop at 11/18/24 9032    levothyroxine tablet 37.5 mcg, 37.5 mcg, Oral, Early Morning, Paco Carmona MD, 37.5 mcg at 11/19/24 0762     lidocaine (LIDODERM) 5 % patch 1 patch, 1 patch, Topical, Daily, Perlita Torres MD, 1 patch at 11/19/24 0930    meclizine (ANTIVERT) tablet 12.5 mg, 12.5 mg, Oral, Daily PRN, Paco Carmona MD    melatonin tablet 3 mg, 3 mg, Oral, HS PRN, Bi Luke PA-C, 3 mg at 11/19/24 0014    metoprolol succinate (TOPROL-XL) 24 hr tablet 50 mg, 50 mg, Oral, Q12H ARY, Paco Carmona MD, 50 mg at 11/19/24 0932    Milnacipran HCl TABS 100 mg, 1 tablet, Oral, Daily, Paco Carmona MD    oxyCODONE (ROXICODONE) immediate release tablet 10 mg, 10 mg, Oral, Q6H PRN, Krystina Preston PA-C, 10 mg at 11/18/24 2041    oxyCODONE (ROXICODONE) IR tablet 5 mg, 5 mg, Oral, Q6H PRN, Krystina Preston PA-C, 5 mg at 11/19/24 0931    pantoprazole (PROTONIX) EC tablet 40 mg, 40 mg, Oral, Early Morning, Paco Carmona MD, 40 mg at 11/19/24 0537    sucralfate (CARAFATE) tablet 1 g, 1 g, Oral, BID AC, Krystina Preston PA-C, 1 g at 11/19/24 0537    Labs/Data:        Results from last 7 days   Lab Units 11/19/24  0551 11/18/24  0526 11/17/24  1020   WBC Thousand/uL 9.02 5.74 6.63   HEMOGLOBIN g/dL 10.7* 9.2* 10.0*   HEMATOCRIT % 33.6* 29.3* 31.7*   PLATELETS Thousands/uL 253 235 237     Results from last 7 days   Lab Units 11/19/24  0551 11/18/24  0526 11/17/24  1020   POTASSIUM mmol/L 3.9 4.2 4.3   CHLORIDE mmol/L 99 100 98   CO2 mmol/L 29 30 32   BUN mg/dL 22 28* 24   CREATININE mg/dL 1.23 1.33* 1.23     Rukhsana Dasilva PA-C

## 2024-11-19 NOTE — ASSESSMENT & PLAN NOTE
Was placed on melatonin however reports this is ineffective  Requesting additional sleep aid  Has trialed Ambien in the past and this worked well for her

## 2024-11-20 ENCOUNTER — APPOINTMENT (INPATIENT)
Dept: RADIOLOGY | Facility: HOSPITAL | Age: 79
DRG: 562 | End: 2024-11-20
Payer: MEDICARE

## 2024-11-20 LAB
ANION GAP SERPL CALCULATED.3IONS-SCNC: 9 MMOL/L (ref 4–13)
ATRIAL RATE: 106 BPM
BUN SERPL-MCNC: 19 MG/DL (ref 5–25)
CALCIUM SERPL-MCNC: 9.9 MG/DL (ref 8.4–10.2)
CHLORIDE SERPL-SCNC: 94 MMOL/L (ref 96–108)
CO2 SERPL-SCNC: 30 MMOL/L (ref 21–32)
CREAT SERPL-MCNC: 1.36 MG/DL (ref 0.6–1.3)
GFR SERPL CREATININE-BSD FRML MDRD: 36 ML/MIN/1.73SQ M
GLUCOSE SERPL-MCNC: 171 MG/DL (ref 65–140)
GLUCOSE SERPL-MCNC: 176 MG/DL (ref 65–140)
GLUCOSE SERPL-MCNC: 187 MG/DL (ref 65–140)
GLUCOSE SERPL-MCNC: 200 MG/DL (ref 65–140)
GLUCOSE SERPL-MCNC: 204 MG/DL (ref 65–140)
P AXIS: 20 DEGREES
POTASSIUM SERPL-SCNC: 3.7 MMOL/L (ref 3.5–5.3)
PR INTERVAL: 140 MS
QRS AXIS: -40 DEGREES
QRSD INTERVAL: 134 MS
QT INTERVAL: 394 MS
QTC INTERVAL: 524 MS
SARS-COV-2 RNA RESP QL NAA+PROBE: NEGATIVE
SODIUM SERPL-SCNC: 133 MMOL/L (ref 135–147)
T WAVE AXIS: 106 DEGREES
VENTRICULAR RATE: 106 BPM

## 2024-11-20 PROCEDURE — 99232 SBSQ HOSP IP/OBS MODERATE 35: CPT | Performed by: INTERNAL MEDICINE

## 2024-11-20 PROCEDURE — 94760 N-INVAS EAR/PLS OXIMETRY 1: CPT

## 2024-11-20 PROCEDURE — 80048 BASIC METABOLIC PNL TOTAL CA: CPT

## 2024-11-20 PROCEDURE — 97110 THERAPEUTIC EXERCISES: CPT

## 2024-11-20 PROCEDURE — 87505 NFCT AGENT DETECTION GI: CPT | Performed by: PHYSICIAN ASSISTANT

## 2024-11-20 PROCEDURE — 94664 DEMO&/EVAL PT USE INHALER: CPT

## 2024-11-20 PROCEDURE — 93005 ELECTROCARDIOGRAM TRACING: CPT

## 2024-11-20 PROCEDURE — 82948 REAGENT STRIP/BLOOD GLUCOSE: CPT

## 2024-11-20 PROCEDURE — 97167 OT EVAL HIGH COMPLEX 60 MIN: CPT

## 2024-11-20 PROCEDURE — 87635 SARS-COV-2 COVID-19 AMP PRB: CPT | Performed by: PHYSICIAN ASSISTANT

## 2024-11-20 PROCEDURE — 71045 X-RAY EXAM CHEST 1 VIEW: CPT

## 2024-11-20 PROCEDURE — 93010 ELECTROCARDIOGRAM REPORT: CPT

## 2024-11-20 PROCEDURE — 97530 THERAPEUTIC ACTIVITIES: CPT

## 2024-11-20 PROCEDURE — 99233 SBSQ HOSP IP/OBS HIGH 50: CPT | Performed by: PHYSICIAN ASSISTANT

## 2024-11-20 PROCEDURE — 97116 GAIT TRAINING THERAPY: CPT

## 2024-11-20 PROCEDURE — 94640 AIRWAY INHALATION TREATMENT: CPT

## 2024-11-20 RX ORDER — LEVALBUTEROL INHALATION SOLUTION 1.25 MG/3ML
1.25 SOLUTION RESPIRATORY (INHALATION)
Status: DISCONTINUED | OUTPATIENT
Start: 2024-11-20 | End: 2024-11-24

## 2024-11-20 RX ADMIN — LEVALBUTEROL HYDROCHLORIDE 1.25 MG: 1.25 SOLUTION RESPIRATORY (INHALATION) at 20:08

## 2024-11-20 RX ADMIN — GUAIFENESIN 600 MG: 600 TABLET, EXTENDED RELEASE ORAL at 21:23

## 2024-11-20 RX ADMIN — FUROSEMIDE 60 MG: 10 INJECTION, SOLUTION INTRAVENOUS at 08:09

## 2024-11-20 RX ADMIN — FERROUS SULFATE TAB 325 MG (65 MG ELEMENTAL FE) 325 MG: 325 (65 FE) TAB at 08:09

## 2024-11-20 RX ADMIN — LIDOCAINE 1 PATCH: 50 PATCH TOPICAL at 08:05

## 2024-11-20 RX ADMIN — METOPROLOL SUCCINATE 50 MG: 50 TABLET, EXTENDED RELEASE ORAL at 08:10

## 2024-11-20 RX ADMIN — METOPROLOL SUCCINATE 50 MG: 50 TABLET, EXTENDED RELEASE ORAL at 21:23

## 2024-11-20 RX ADMIN — INSULIN LISPRO 2 UNITS: 100 INJECTION, SOLUTION INTRAVENOUS; SUBCUTANEOUS at 12:23

## 2024-11-20 RX ADMIN — LOPERAMIDE HYDROCHLORIDE 2 MG: 2 CAPSULE ORAL at 16:14

## 2024-11-20 RX ADMIN — ATORVASTATIN CALCIUM 40 MG: 40 TABLET, FILM COATED ORAL at 16:14

## 2024-11-20 RX ADMIN — ENOXAPARIN SODIUM 40 MG: 40 INJECTION SUBCUTANEOUS at 08:09

## 2024-11-20 RX ADMIN — INSULIN LISPRO 1 UNITS: 100 INJECTION, SOLUTION INTRAVENOUS; SUBCUTANEOUS at 08:10

## 2024-11-20 RX ADMIN — ASPIRIN 81 MG CHEWABLE TABLET 81 MG: 81 TABLET CHEWABLE at 08:09

## 2024-11-20 RX ADMIN — SUCRALFATE 1 G: 1 TABLET ORAL at 16:14

## 2024-11-20 RX ADMIN — LOPERAMIDE HYDROCHLORIDE 2 MG: 2 CAPSULE ORAL at 21:23

## 2024-11-20 RX ADMIN — LOPERAMIDE HYDROCHLORIDE 2 MG: 2 CAPSULE ORAL at 12:21

## 2024-11-20 RX ADMIN — FUROSEMIDE 60 MG: 10 INJECTION, SOLUTION INTRAVENOUS at 16:14

## 2024-11-20 RX ADMIN — GUAIFENESIN 600 MG: 600 TABLET, EXTENDED RELEASE ORAL at 08:09

## 2024-11-20 RX ADMIN — LOPERAMIDE HYDROCHLORIDE 2 MG: 2 CAPSULE ORAL at 08:16

## 2024-11-20 RX ADMIN — BACLOFEN 10 MG: 10 TABLET ORAL at 08:09

## 2024-11-20 RX ADMIN — BACLOFEN 10 MG: 10 TABLET ORAL at 21:23

## 2024-11-20 RX ADMIN — GABAPENTIN 100 MG: 100 CAPSULE ORAL at 21:23

## 2024-11-20 RX ADMIN — LATANOPROST 1 DROP: 50 SOLUTION OPHTHALMIC at 21:24

## 2024-11-20 RX ADMIN — LEVOTHYROXINE SODIUM 37.5 MCG: 75 TABLET ORAL at 05:25

## 2024-11-20 RX ADMIN — OXYCODONE HYDROCHLORIDE 10 MG: 10 TABLET ORAL at 08:10

## 2024-11-20 RX ADMIN — ZOLPIDEM TARTRATE 5 MG: 5 TABLET, COATED ORAL at 21:23

## 2024-11-20 RX ADMIN — OXYCODONE HYDROCHLORIDE 10 MG: 10 TABLET ORAL at 15:11

## 2024-11-20 RX ADMIN — PANTOPRAZOLE SODIUM 40 MG: 40 TABLET, DELAYED RELEASE ORAL at 05:25

## 2024-11-20 RX ADMIN — SUCRALFATE 1 G: 1 TABLET ORAL at 05:25

## 2024-11-20 NOTE — RESTORATIVE TECHNICIAN NOTE
Restorative Technician Note      Patient Name: Mattie Sureshcoopergisell     Restorative Tech Visit Date: 11/20/24  Note Type: Mobility  Patient Position Upon Consult: Supine  Activity Performed: Ambulated  Assistive Device: Roller walker  Patient Position at End of Consult: Bedside chair; All needs within reach; Bed/Chair alarm activated            ns

## 2024-11-20 NOTE — ASSESSMENT & PLAN NOTE
Had large bowel movement 11/18 and has had frequent bouts of stool after this  C. difficile checked and was negative  We will check enteric pathogen panel  Additionally check COVID to rule out underlying viral etiology  Consider GI consult if stools persist

## 2024-11-20 NOTE — PROGRESS NOTES
Progress Note - Hospitalist   Name: Mattie Varela 79 y.o. female I MRN: [id number]  Unit/Bed#: E4 -01 I Date of Admission: 11/17/2024   Date of Service: 11/20/2024 I Hospital Day: 3    Assessment & Plan  Acute on chronic diastolic congestive heart failure (HCC)  Wt Readings from Last 3 Encounters:   11/20/24 79.8 kg (175 lb 14.8 oz)   10/18/24 74.3 kg (163 lb 12.8 oz)   10/02/24 85.5 kg (188 lb 9.6 oz)   79 year old female presented with shortness of breath, lower extremity edema, volume overload, and weight gain secondary to acute on chronic diastolic congestive heart failure.  Takes torsemide 30 mg daily  Receiving IV lasix 40 mg bid --> increased to 60 mg BID  Continue metoprolol 50 mg twice daily  Daily weights, I&Os, Na/fluid restriction-we continue to decrease  Down about 5.0 L, admission weight 194 pounds, current weight 175 pounds  Cardiology price checking [facility]-$25 per day-unable to afford  Ongoing recommendations cardiology-likely transition to oral diuretics tomorrow  Type 2 diabetes mellitus with other skin complications (HCC)  Lab Results   Component Value Date    HGBA1C 7.4 (H) 10/12/2024     Recent Labs     11/19/24  1618 11/19/24  2126 11/20/24  0807 11/20/24  1222   POCGLU 119 152* 171* 204*   Holding Metformin  SSI & acu-checks ac/hs  Patella fracture  S/p recent fall at [facility]  CT left knee showing what appears to be a fracture of her bipartite patella   Seen by orthopedic team  Knee immobilizer x 2 weeks with WBAT to LLE   Office follow-up in 2 weeks with repeat x-rays   Urinary retention  Had overactive bladder in the past and received Botox.   Continues to have urinary retention requiring ahn catheter placement. For outpatient voiding trial.  Void trial should occur with urology in the outpt setting  UA >100,000  cfu GNR. Was recently treated for Klebsiella cystitis  Without symptoms, fever or leukocytosis & chronic ahn, monitor off abx  Chronic pain disorder  On as needed  Percocet at home  C/w oxycodone 5mg prn for moderate pain & oxycodone 10mg for severe pain with acute fx  Stage 3a chronic kidney disease (HCC)  Lab Results   Component Value Date    EGFR 36 11/20/2024    EGFR 41 11/19/2024    EGFR 37 11/18/2024    CREATININE 1.36 (H) 11/20/2024    CREATININE 1.23 11/19/2024    CREATININE 1.33 (H) 11/18/2024   Baseline around 1.3   Monitor closely with diuresis   Hypertension  Continue Metoprolol 50 mg twice daily  /78  Hypothyroidism  Continue levothyroxine  Loose stools  Had large bowel movement 11/18 and has had frequent bouts of stool after this  C. difficile checked and was negative  We will check enteric pathogen panel  Additionally check COVID to rule out underlying viral etiology  Consider GI consult if stools persist  Insomnia  Was placed on melatonin however reports this is ineffective  Requesting additional sleep aid  Has trialed Ambien in the past and this worked well for her    VTE Pharmacologic Prophylaxis:   lovenox     Mobility:   Basic Mobility Inpatient Raw Score: 8  [facility] Goal: 3: Sit at edge of bed  [facility] Achieved: 4: Move to chair/commode    Patient Centered Rounds: I performed bedside rounds with nursing staff today.   Discussions with Specialists or Other Care Team Provider: nursing, cardiology     Education and Discussions with Family / Patient: patient, daughter via telephone    Current Length of Stay: 3 day(s)  Current Patient Status: Inpatient   Certification Statement: With need for inpatient stay for IV diuresis and infectious workup  Discharge Plan: 24-48 hrs    Code Status: Level 1 - Full Code    Subjective   Seen in bed.  Continues to complain of some loose stool.  Eager to work with physical therapy today.  Also complaining of some cough, congestion/mucus, shortness of breath, pleuritic chest pain, sore throat.    Objective :  Temp:  [97.1 °F (36.2 °C)-98.3 °F (36.8 °C)] 98.2 °F (36.8 °C)  HR:  [] 112  BP: ()/(52-78) 120/78  Resp:   [18-20] 20  SpO2:  [92 %-96 %] 92 %  O2 Device: None (Room air)    Body mass index is 34.36 kg/m².     Input and Output Summary (last 24 hours):     Intake/Output Summary (Last 24 hours) at 11/20/2024 1524  Last data filed at 11/20/2024 0900  Gross per 24 hour   Intake 1030 ml   Output 900 ml   Net 130 ml       Physical Exam  Vitals and nursing note reviewed.   Constitutional:       General: She is not in acute distress.     Appearance: She is normal weight. She is not ill-appearing, toxic-appearing or diaphoretic.   HENT:      Head: Normocephalic and atraumatic.   Eyes:      General: No scleral icterus.  Cardiovascular:      Rate and Rhythm: Normal rate and regular rhythm.   Pulmonary:      Effort: Pulmonary effort is normal. No respiratory distress.      Breath sounds: No stridor. Wheezing present. No rhonchi.   Abdominal:      General: Bowel sounds are normal. There is no distension.      Palpations: Abdomen is soft. There is no mass.      Tenderness: There is no abdominal tenderness.      Hernia: No hernia is present.   Musculoskeletal:         General: No swelling.      Cervical back: Neck supple.   Skin:     General: Skin is warm and dry.   Neurological:      Mental Status: She is alert and oriented to person, place, and time. Mental status is at baseline.   Psychiatric:         Mood and Affect: Mood normal.         Behavior: Behavior normal.       Lines/Drains:  Lines/Drains/Airways       Active Status       Name Placement date Placement time Site Days    Urethral Catheter 16 Fr. 10/26/24  1311  --  25                      Lab Results: I have reviewed the following results:   Results from last 7 days   Lab Units 11/19/24  0551   WBC Thousand/uL 9.02   HEMOGLOBIN g/dL 10.7*   HEMATOCRIT % 33.6*   PLATELETS Thousands/uL 253   SEGS PCT % 65   LYMPHO PCT % 20   MONO PCT % 12   EOS PCT % 3     Results from last 7 days   Lab Units 11/20/24  1135 11/19/24  0551 11/18/24  0526   SODIUM mmol/L 133*   < > 138    POTASSIUM mmol/L 3.7   < > 4.2   CHLORIDE mmol/L 94*   < > 100   CO2 mmol/L 30   < > 30   BUN mg/dL 19   < > 28*   CREATININE mg/dL 1.36*   < > 1.33*   ANION GAP mmol/L 9   < > 8   CALCIUM mg/dL 9.9   < > 8.8   ALBUMIN g/dL  --   --  3.0*   TOTAL BILIRUBIN mg/dL  --   --  0.29   ALK PHOS U/L  --   --  82   ALT U/L  --   --  5*   AST U/L  --   --  11*   GLUCOSE RANDOM mg/dL 200*   < > 141*    < > = values in this interval not displayed.         Results from last 7 days   Lab Units 11/20/24  1222 11/20/24  0807 11/19/24  2126 11/19/24  1618 11/19/24  1109 11/19/24  0740 11/18/24  2100 11/18/24  1631 11/18/24  1129 11/18/24  0740 11/17/24  2112 11/17/24  1633   POC GLUCOSE mg/dl 204* 171* 152* 119 183* 149* 158* 236* 267* 138 200* 113               Recent Cultures (last 7 days):   Results from last 7 days   Lab Units 11/19/24  0011 11/17/24  1002   URINE CULTURE   --  >100,000 cfu/ml   C DIFF TOXIN B BY PCR  Negative  --              Last 24 Hours Medication List:     Current Facility-Administered Medications:     acetaminophen (TYLENOL) tablet 650 mg, Q6H PRN    albuterol (PROVENTIL HFA,VENTOLIN HFA) inhaler 2 puff, Q6H PRN    aluminum-magnesium hydroxide-simethicone (MAALOX) oral suspension 30 mL, Q4H PRN    aspirin chewable tablet 81 mg, Daily    atorvastatin (LIPITOR) tablet 40 mg, Daily With Dinner    baclofen tablet 10 mg, BID    enoxaparin (LOVENOX) subcutaneous injection 40 mg, Daily    ferrous sulfate tablet 325 mg, Daily With Breakfast    furosemide (LASIX) injection 60 mg, BID (diuretic)    gabapentin (NEURONTIN) capsule 100 mg, HS    guaiFENesin (MUCINEX) 12 hr tablet 600 mg, Q12H ARY    insulin lispro (HumALOG/ADMELOG) 100 units/mL subcutaneous injection 1-6 Units, TID AC **AND** Fingerstick Glucose (POCT), TID AC    latanoprost (XALATAN) 0.005 % ophthalmic solution 1 drop, HS    levothyroxine tablet 37.5 mcg, Early Morning    lidocaine (LIDODERM) 5 % patch 1 patch, Daily    loperamide (IMODIUM) capsule  2 mg, 4x Daily PRN    meclizine (ANTIVERT) tablet 12.5 mg, Daily PRN    melatonin tablet 3 mg, HS PRN    metoprolol succinate (TOPROL-XL) 24 hr tablet 50 mg, Q12H ARY    Milnacipran HCl TABS 100 mg, Daily    oxyCODONE (ROXICODONE) immediate release tablet 10 mg, Q6H PRN    oxyCODONE (ROXICODONE) IR tablet 5 mg, Q6H PRN    pantoprazole (PROTONIX) EC tablet 40 mg, Early Morning    sucralfate (CARAFATE) tablet 1 g, BID AC    [START ON 11/21/2024] torsemide (DEMADEX) tablet 30 mg, BID    zolpidem (AMBIEN) tablet 5 mg, HS PRN    Administrative Statements   Today, Patient Was Seen By: Susannah Mcclain PA-C      **Please Note: This note may have been constructed using a voice recognition system.**

## 2024-11-20 NOTE — PROGRESS NOTES
Progress Note - Cardiology   Name: Mattie Varela 79 y.o. female I MRN: [id number]  Unit/Bed#: E4 -01 I Date of Admission: 11/17/2024   Date of Service: 11/20/2024 I Hospital Day: 3    Assessment & Plan  Acute on chronic diastolic congestive heart failure (HCC)   - TTE 05/23/24: LVEF 70%, grade I diastolic dysfunction, mild LVOT obstruction, moderate annular calcification, mild TR   - BNP 11/17/24: 264 (BNP 05/22/24 was 302)  - CXR 11/17/24: no acute cardiopulmonary disease   - about   - Neurohormonal Blockade:   -- beta blocker: Toprol-XL 50 mg BID  -- ACE/ARB/ARNi: none  -- aldosterone antagonist: none   -- SGLT2: price checked [facility] and would be $25 a day. No affordable  -- diuretic: torsemide 30 mg daily  -- inpatient diuretic: IV lasix 60 mg BID  Hypertension  - BP elevated on admission with systolic 115-162 mmHg  - home regimen: Toprol-XL 50 mg BID  Type 2 diabetes mellitus with other skin complications ([facility])  - A1c 10/12/24: 7.4  - on metformin   Stage 3a chronic kidney disease ([facility])  - follows with outpatient nephrology  - baseline creatinine ~1.3-1.4, GFR 37-42  Patella fracture  - XR 11/15/24: showing smooth linear lucencies coursing through the superior lateral aspect of the patella, nondisplaced fracture difficult to exclude   - pending CT of lower extremity   - orthopedics following  Urinary retention  - history of urinary retention with spine stimulator in place   - continues to have urinary retention requiring Bateman catheter   - need outpatient voiding trial  Hypothyroidism  - continue levothyroxine   Loose stools  - 11/19/24: C.diff pcr negative  - rapid flu negative on admission   - can consider viral panel with increased pleuritic pain, mucous, and sore throat   Chronic pain disorder    Insomnia      Plan:  Continues to diurese well on current regimen. Down 9 lbs per bed scale. Urine output -2.4 L. Kidney function improved with diuresis, likely cardiorenal in nature. Labs show Na 137, K  3.9 Cr 1.23, GFR 41, and Mg 2.0. Her dry weight is 166 lbs.  - would continue with IV diuresis today and plan to switch her to torsemide 20 mg BID starting tomorrow.   - pending BMP to check kidney and electrolyte function  - strict I/O's and daily weights   - continue ASA 81 mg daily, atorvastatin 40 mg daily and Toprol-XL 50 mg BID  - consider viral workup will message SLIM     Subjective:  Pt seen and examined at bedside. No acute events overnight. She states she noticed this morning that it hurts to take a deep breath in, increased mucous, and sore throat. Her rapid flu was negative on admission. Frustrated that she has not been able to get up an walk the hallways.     Vitals:  Vitals:    11/19/24 0546 11/20/24 0600   Weight: 83.9 kg (184 lb 15.5 oz) 79.8 kg (175 lb 14.8 oz)   ,  Vitals:    11/19/24 2158 11/19/24 2332 11/20/24 0600 11/20/24 0810   BP: 108/68 99/52  125/59   BP Location:  Left arm  Left arm   Pulse: 96 99  (!) 110   Resp:  18  20   Temp:  (!) 97.1 °F (36.2 °C)  98.3 °F (36.8 °C)   TempSrc:  Temporal  Temporal   SpO2:  92%  96%   Weight:   79.8 kg (175 lb 14.8 oz)    Height:           Exam:  Physical Exam  Vitals and nursing note reviewed.   Constitutional:       General: She is not in acute distress.     Appearance: Normal appearance. She is not ill-appearing.   HENT:      Head: Normocephalic and atraumatic.      Nose: Nose normal.      Mouth/Throat:      Mouth: Mucous membranes are moist.   Eyes:      General: No scleral icterus.  Neck:      Vascular: No JVD.   Cardiovascular:      Rate and Rhythm: Normal rate and regular rhythm.      Pulses:           Radial pulses are 2+ on the right side and 2+ on the left side.      Heart sounds: Murmur heard.      Systolic murmur is present with a grade of 2/6.   Pulmonary:      Effort: Pulmonary effort is normal. No respiratory distress.      Breath sounds: Normal breath sounds. No stridor. No wheezing, rhonchi or rales.   Abdominal:      General: Abdomen  is flat.   Musculoskeletal:         General: No swelling. Normal range of motion.      Cervical back: Normal range of motion.      Right lower leg: No edema.      Left lower leg: No edema.   Skin:     General: Skin is warm and dry.      Capillary Refill: Capillary refill takes less than 2 seconds.   Neurological:      Mental Status: She is alert. Mental status is at baseline.   Psychiatric:         Thought Content: Thought content normal.        Medications:    Current Facility-Administered Medications:     acetaminophen (TYLENOL) tablet 650 mg, 650 mg, Oral, Q6H PRN, Paco Carmona MD    albuterol (PROVENTIL HFA,VENTOLIN HFA) inhaler 2 puff, 2 puff, Inhalation, Q6H PRN, Paco Carmona MD    aluminum-magnesium hydroxide-simethicone (MAALOX) oral suspension 30 mL, 30 mL, Oral, Q4H PRN, Krystina Preston PA-C, 30 mL at 11/19/24 2159    aspirin chewable tablet 81 mg, 81 mg, Oral, Daily, Paco Carmona MD, 81 mg at 11/20/24 0809    atorvastatin (LIPITOR) tablet 40 mg, 40 mg, Oral, Daily With Dinner, Paco Carmona MD, 40 mg at 11/19/24 1554    baclofen tablet 10 mg, 10 mg, Oral, BID, Paco Carmona MD, 10 mg at 11/20/24 0809    enoxaparin (LOVENOX) subcutaneous injection 40 mg, 40 mg, Subcutaneous, Daily, Paco Carmona MD, 40 mg at 11/20/24 0809    ferrous sulfate tablet 325 mg, 325 mg, Oral, Daily With Breakfast, Paco Carmona MD, 325 mg at 11/20/24 0809    furosemide (LASIX) injection 60 mg, 60 mg, Intravenous, BID (diuretic), Paco Boyer MD, 60 mg at 11/20/24 0809    gabapentin (NEURONTIN) capsule 100 mg, 100 mg, Oral, HS, Krystina Preston PA-C, 100 mg at 11/19/24 2159    guaiFENesin (MUCINEX) 12 hr tablet 600 mg, 600 mg, Oral, Q12H ARY, Bi Luke PA-C, 600 mg at 11/20/24 0809    insulin lispro (HumALOG/ADMELOG) 100 units/mL subcutaneous injection 1-6 Units, 1-6 Units, Subcutaneous, TID AC, 1 Units at 11/20/24 0810 **AND** Fingerstick Glucose (POCT), , , TID AC, Paco Carmona MD     latanoprost (XALATAN) 0.005 % ophthalmic solution 1 drop, 1 drop, Both Eyes, HS, Paco Carmona MD, 1 drop at 11/19/24 2205    levothyroxine tablet 37.5 mcg, 37.5 mcg, Oral, Early Morning, Paco Carmona MD, 37.5 mcg at 11/20/24 0525    lidocaine (LIDODERM) 5 % patch 1 patch, 1 patch, Topical, Daily, Perlita Torres MD, 1 patch at 11/20/24 0805    loperamide (IMODIUM) capsule 2 mg, 2 mg, Oral, 4x Daily PRN, Perlita Torres MD, 2 mg at 11/20/24 0816    meclizine (ANTIVERT) tablet 12.5 mg, 12.5 mg, Oral, Daily PRN, Paco Carmona MD    melatonin tablet 3 mg, 3 mg, Oral, HS PRN, Bi Luke PA-C, 3 mg at 11/19/24 0014    metoprolol succinate (TOPROL-XL) 24 hr tablet 50 mg, 50 mg, Oral, Q12H ARY, Paco Carmona MD, 50 mg at 11/20/24 0810    Milnacipran HCl TABS 100 mg, 1 tablet, Oral, Daily, Paco Carmona MD    oxyCODONE (ROXICODONE) immediate release tablet 10 mg, 10 mg, Oral, Q6H PRN, Krystina Preston PA-C, 10 mg at 11/20/24 0810    oxyCODONE (ROXICODONE) IR tablet 5 mg, 5 mg, Oral, Q6H PRN, Krystina Preston PA-C, 5 mg at 11/19/24 0931    pantoprazole (PROTONIX) EC tablet 40 mg, 40 mg, Oral, Early Morning, Paco Carmona MD, 40 mg at 11/20/24 0525    sucralfate (CARAFATE) tablet 1 g, 1 g, Oral, BID AC, Krystina Preston PA-C, 1 g at 11/20/24 0525    zolpidem (AMBIEN) tablet 5 mg, 5 mg, Oral, HS PRN, Susannah Mcclain PA-C, 5 mg at 11/19/24 8269    Labs/Data:        Results from last 7 days   Lab Units 11/19/24  0551 11/18/24  0526 11/17/24  1020   WBC Thousand/uL 9.02 5.74 6.63   HEMOGLOBIN g/dL 10.7* 9.2* 10.0*   HEMATOCRIT % 33.6* 29.3* 31.7*   PLATELETS Thousands/uL 253 235 237     Results from last 7 days   Lab Units 11/19/24  0551 11/18/24  0526 11/17/24  1020   POTASSIUM mmol/L 3.9 4.2 4.3   CHLORIDE mmol/L 99 100 98   CO2 mmol/L 29 30 32   BUN mg/dL 22 28* 24   CREATININE mg/dL 1.23 1.33* 1.23     Rukhsana Dasilva PA-C

## 2024-11-20 NOTE — ASSESSMENT & PLAN NOTE
Had overactive bladder in the past and received Botox.   Continues to have urinary retention requiring ahn catheter placement. For outpatient voiding trial.  Void trial should occur with urology in the outpt setting  UA >100,000  cfu GNR. Was recently treated for Klebsiella cystitis  Without symptoms, fever or leukocytosis & chronic ahn, monitor off abx

## 2024-11-20 NOTE — ASSESSMENT & PLAN NOTE
Lab Results   Component Value Date    EGFR 36 11/20/2024    EGFR 41 11/19/2024    EGFR 37 11/18/2024    CREATININE 1.36 (H) 11/20/2024    CREATININE 1.23 11/19/2024    CREATININE 1.33 (H) 11/18/2024   Baseline around 1.3   Monitor closely with diuresis

## 2024-11-20 NOTE — PLAN OF CARE
Problem: Potential for Falls  Goal: Patient will remain free of falls  Description: INTERVENTIONS:  - Educate patient/family on patient safety including physical limitations  - Instruct patient to call for assistance with activity   - Consult OT/PT to assist with strengthening/mobility   - Keep Call bell within reach  - Keep bed low and locked with side rails adjusted as appropriate  - Keep care items and personal belongings within reach  - Initiate and maintain comfort rounds  - Make Fall Risk Sign visible to staff  - Offer Toileting every 2 Hours, in advance of need  - Initiate/Maintain bed alarm  - Obtain necessary fall risk management equipment: alarm  - Apply yellow socks and bracelet for high fall risk patients  - Consider moving patient to room near nurses station  Outcome: Progressing     Problem: PAIN - ADULT  Goal: Verbalizes/displays adequate comfort level or baseline comfort level  Description: Interventions:  - Encourage patient to monitor pain and request assistance  - Assess pain using appropriate pain scale  - Administer analgesics based on type and severity of pain and evaluate response  - Implement non-pharmacological measures as appropriate and evaluate response  - Consider cultural and social influences on pain and pain management  - Notify physician/advanced practitioner if interventions unsuccessful or patient reports new pain  Outcome: Progressing     Problem: SAFETY ADULT  Goal: Patient will remain free of falls  Description: INTERVENTIONS:  - Educate patient/family on patient safety including physical limitations  - Instruct patient to call for assistance with activity   - Consult OT/PT to assist with strengthening/mobility   - Keep Call bell within reach  - Keep bed low and locked with side rails adjusted as appropriate  - Keep care items and personal belongings within reach  - Initiate and maintain comfort rounds  - Make Fall Risk Sign visible to staff  - Offer Toileting every 2 Hours, in  advance of need  - Initiate/Maintain bed alarm  - Obtain necessary fall risk management equipment: alarm  - Apply yellow socks and bracelet for high fall risk patients  - Consider moving patient to room near nurses station  Outcome: Progressing  Goal: Maintain or return to baseline ADL function  Description: INTERVENTIONS:  -  Assess patient's ability to carry out ADLs; assess patient's baseline for ADL function and identify physical deficits which impact ability to perform ADLs (bathing, care of mouth/teeth, toileting, grooming, dressing, etc.)  - Assess/evaluate cause of self-care deficits   - Assess range of motion  - Assess patient's mobility; develop plan if impaired  - Assess patient's need for assistive devices and provide as appropriate  - Encourage maximum independence but intervene and supervise when necessary  - Involve family in performance of ADLs  - Assess for home care needs following discharge   - Consider OT consult to assist with ADL evaluation and planning for discharge  - Provide patient education as appropriate  Outcome: Progressing  Goal: Maintains/Returns to pre admission functional level  Description: INTERVENTIONS:  - Perform AM-PAC 6 Click Basic Mobility/ Daily Activity assessment daily.  - Set and communicate daily mobility goal to care team and patient/family/caregiver.   - Collaborate with rehabilitation services on mobility goals if consulted  - Perform Range of Motion 3 times a day.  - Reposition patient every 2 hours.  - Dangle patient 3 times a day  - Stand patient 3 times a day  - Ambulate patient 3 times a day  - Out of bed to chair 3 times a day   - Out of bed for meals 3 times a day  - Out of bed for toileting  - Record patient progress and toleration of activity level   Outcome: Progressing     Problem: DISCHARGE PLANNING  Goal: Discharge to home or other facility with appropriate resources  Description: INTERVENTIONS:  - Identify barriers to discharge w/patient and caregiver  -  Arrange for needed discharge resources and transportation as appropriate  - Identify discharge learning needs (meds, wound care, etc.)  - Arrange for interpretive services to assist at discharge as needed  - Refer to Case Management Department for coordinating discharge planning if the patient needs post-hospital services based on physician/advanced practitioner order or complex needs related to functional status, cognitive ability, or social support system  Outcome: Progressing     Problem: Knowledge Deficit  Goal: Patient/family/caregiver demonstrates understanding of disease process, treatment plan, medications, and discharge instructions  Description: Complete learning assessment and assess knowledge base.  Interventions:  - Provide teaching at level of understanding  - Provide teaching via preferred learning methods  Outcome: Progressing     Problem: CARDIOVASCULAR - ADULT  Goal: Maintains optimal cardiac output and hemodynamic stability  Description: INTERVENTIONS:  - Monitor I/O, vital signs and rhythm  - Monitor for S/S and trends of decreased cardiac output  - Administer and titrate ordered vasoactive medications to optimize hemodynamic stability  - Assess quality of pulses, skin color and temperature  - Assess for signs of decreased coronary artery perfusion  - Instruct patient to report change in severity of symptoms  Outcome: Progressing  Goal: Absence of cardiac dysrhythmias or at baseline rhythm  Description: INTERVENTIONS:  - Continuous cardiac monitoring, vital signs, obtain 12 lead EKG if ordered  - Administer antiarrhythmic and heart rate control medications as ordered  - Monitor electrolytes and administer replacement therapy as ordered  Outcome: Progressing     Problem: Prexisting or High Potential for Compromised Skin Integrity  Goal: Skin integrity is maintained or improved  Description: INTERVENTIONS:  - Identify patients at risk for skin breakdown  - Assess and monitor skin integrity  -  Assess and monitor nutrition and hydration status  - Monitor labs   - Assess for incontinence   - Turn and reposition patient  - Assist with mobility/ambulation  - Relieve pressure over bony prominences  - Avoid friction and shearing  - Provide appropriate hygiene as needed including keeping skin clean and dry  - Evaluate need for skin moisturizer/barrier cream  - Collaborate with interdisciplinary team   - Patient/family teaching  - Consider wound care consult   Outcome: Progressing

## 2024-11-20 NOTE — CASE MANAGEMENT
Case Management Discharge Planning Note    Patient name Mattie Varela  Location [facility] /E4 -* MRN [id number]  : 1945 Date 2024       Current Admission Date: 2024  Current Admission Diagnosis:Acute on chronic diastolic congestive heart failure (HCC)   Patient Active Problem List    Diagnosis Date Noted Date Diagnosed    Loose stools 2024     Insomnia 2024     Patella fracture 2024     Pain in both lower extremities 10/30/2024     Chronic heart failure with preserved ejection fraction (HFpEF) ([facility]) 10/25/2024     Klebsiella cystitis 10/25/2024     Acute kidney injury superimposed on chronic kidney disease  ([facility]) 10/18/2024     Acute low back pain 10/13/2024     Chest pain 10/11/2024     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     Generalized edema 2024     Constipation 2024     Leg pain, right 2024     Acute on chronic diastolic congestive heart failure ([facility]) 2024     s/p [employer] loop recorder 3/2/2024 2024     Moderate protein-calorie malnutrition ([facility]) 2024     Hypertensive urgency 2024     AMS (altered mental status) 2024     Encounter for examination for admission to nursing home 2024     Stage 3a chronic kidney disease ([facility]) 01/10/2024     Left ventricular outflow obstruction 2024     Obesity, Class I, BMI 30-34.9 2024     Urinary retention 2023     SADAF (acute kidney injury) ([facility]) 2023     Exertional shortness of breath 2023     Non obstructive CAD 11/15/2023     History of lumbar surgery 11/10/2023     Abnormal urinalysis 2023     Chronic obstructive pulmonary disease, unspecified COPD type ([facility]) 2023     Confusion with body shakes and blank stare 2023     Anemia in stage 3a chronic kidney disease  ([facility]) 2023     Lower extremity edema 2023     Cerebrovascular accident (CVA), unspecified mechanism ([facility]) 2022     Prepyloric ulcer  05/11/2021     Diarrhea 05/11/2021     Mild intermittent asthma without complication 03/11/2020     S/P insertion of spinal cord stimulator 07/18/2018     Iron deficiency anemia secondary to inadequate dietary iron intake 06/19/2018     Type 2 diabetes mellitus with other skin complications (HCC)      Failed back surgical syndrome 03/16/2018     Hypothyroidism 11/20/2017     Degenerative lumbar spinal stenosis 01/25/2017     Glaucoma 01/06/2017     Overactive bladder 08/04/2016     Dyspepsia 02/09/2016     Hypercholesterolemia 02/09/2016     Anxiety 02/09/2015     Chronic pain disorder 12/18/2013     Hypertension 06/12/2013       LOS (days): 3  Geometric Mean LOS (GMLOS) (days): 3.9  Days to GMLOS:1     OBJECTIVE:  Risk of Unplanned Readmission Score: 39.31         Current admission status: Inpatient   Preferred Pharmacy:   [facility] [place] [place] - [address]  [address] [place] [id number]  Phone: [phone] Fax: [phone]    [facility] [place] - [address]  [facility],  [address]  [facility],  First Floor [facility] [address]  Phone: [phone] Fax: [phone]    [facility] - [place], [place] - [address]  [address]  [place] [address]  Phone: [phone] Fax: [phone]    Primary Care Provider: NANY Pollock    Primary Insurance: [employer] [facility]  Secondary Insurance:     DISCHARGE DETAILS:                                                                                                 Additional Comments: CM was able to call and speak to pt's rosierVenice.  CM reviewed recommendations from PT today and also from Director of Wellness from [facility].  Dtr will speak to the pt about STR.

## 2024-11-20 NOTE — PLAN OF CARE
Problem: OCCUPATIONAL THERAPY ADULT  Goal: Performs self-care activities at highest level of function for planned discharge setting.  See evaluation for individualized goals.  Description: Treatment Interventions: ADL retraining, UE strengthening/ROM, Functional transfer training, Endurance training, Cognitive reorientation, Patient/family training, Equipment evaluation/education, Neuromuscular reeducation, Compensatory technique education, Continued evaluation, Energy conservation, Activityengagement          See flowsheet documentation for full assessment, interventions and recommendations.   Note: Limitation: Decreased ADL status, Decreased UE strength, Decreased Safe judgement during ADL, Decreased endurance, Decreased self-care trans, Decreased high-level ADLs (dec balance, functional reach, coordination)  Prognosis: Fair  Assessment: Patient is a 79 y.o. year old female seen for OT eval s/p [facility] with acute>chronic diastolic CHF, patella fx - WBAT w/ KI.  OT consulted to assess ADLs/IADLs/functional mobility and assist w/ D/C planning. Patient demonstrates the following deficits impacting occupational performance: decreased strength , decreased balance, decreased activity tolerance, limited functional reach, impaired problem solving, decreased safety awareness, increased pain, orthopedic restrictions, increased body habitus , impaired coordination, decreased cardiovascular endurance, and decreased skin integrity . These impairments, as well at pt’s difficulty performing ADLs, difficulty performing IADLs, difficulty performing transfers/mobility, limited insight into deficits, decreased initiation and engagement, WBS, fall risk , functional decline , increased reliance on DME , advanced age, and multiple admissions , limit pt’s ability to safely engage in all baseline areas of occupation. Pt's CLOF as follows: eating/grooming: April and supervision , UB ADLs: Mynor, LB ADLs: maxA and  dependent, toileting: maxA, bed mobility: DNT, functional transfers: maxAx2, functional mobility: DNT, sitting/standing tolerance: <1 min. Pt would benefit from continued skilled OT while in acute setting to address deficits as defined above and to maximize (I) w/ ADLs/functional mobility. Occupational performance areas to address include: grooming, bathing/shower, toilet hygiene, dressing, health maintenance, functional mobility, community mobility, clothing management, cleaning, meal prep, and household maintenance. Based on the aforementioned evaluation, functional performance deficits, and assessments, pt has been identified as a high complexity evaluation. At this time, recommendation for pt to receive post-acute rehabilitation services at a Level II (moderate resource intensity) due to above deficits and CLOF. OT will continue to follow pt 3-5x/wk to address the goals listed below to  w/in 10-14 days.     Rehab Resource Intensity Level, OT: II (Moderate Resource Intensity)

## 2024-11-20 NOTE — PHYSICAL THERAPY NOTE
PHYSICAL THERAPY NOTE          Patient Name: Mattie Varela  Today's Date: 11/20/2024 11/20/24 1147   Note Type   Note Type Treatment   Pain Assessment   Pain Assessment Tool 0-10   Pain Score 9   Pain Location/Orientation Location: Back  (pain in my back when i take a deep breath.)   Restrictions/Precautions   Braces or Orthoses Knee immobilizer  (L le)   Other Precautions Contact/isolation;Bed Alarm;Chair Alarm;Fall Risk;Pain   General   Chart Reviewed Yes   Family/Caregiver Present No   Cognition   Overall Cognitive Status WFL   Arousal/Participation Alert;Responsive;Cooperative   Attention Within functional limits   Orientation Level Oriented X4   Memory Within functional limits   Following Commands Follows one step commands with increased time or repetition   Subjective   Subjective I have pain in my back when I breathe.   Bed Mobility   Rolling R 2  Maximal assistance   Additional items Bedrails;Increased time required;Verbal cues;LE management;Assist x 2   Rolling L 2  Maximal assistance   Additional items Bedrails;Increased time required;Verbal cues;LE management;Assist x 2   Supine to Sit 3  Moderate assistance   Additional items Assist x 2;HOB elevated;Bedrails;Increased time required;Verbal cues;LE management   Transfers   Sit to Stand 2  Maximal assistance   Additional items Assist x 2;Increased time required;Verbal cues   Stand to Sit 2  Maximal assistance   Additional items Assist x 2;Increased time required;Verbal cues   Additional Comments verbal cues for hand placement,cuesf or positioning of le's.  cues to push through R le and ue's to stand upright.   Ambulation/Elevation   Gait pattern Poor UE support;Improper Weight shift;Decreased foot clearance;Forward Flexion;Inconsistent jorge;Short stride;Step to;Excessively slow;Narrow RUBIN;Antalgic;Decreased L stance   Gait Assistance 2  Maximal assist   Additional  items Assist x 2;Verbal cues   Assistive Device Rolling walker   Distance 5' x1   Ambulation/Elevation Additional Comments ue weightbearing technqiues, upright posture,  le seqeuncing,  verbal cues to straighten R knee and stand upright.  pt with extreme forward flexion.   Balance   Static Sitting Fair   Static Standing Poor -   Dynamic Standing Poor -   Ambulatory Poor -   Endurance Deficit   Endurance Deficit Description pt  incontinent of bowel,, requiring total assist for elvira anal hygiene and donning of brief.  limited by back pain.   Activity Tolerance   Activity Tolerance Patient limited by pain;Patient limited by fatigue   Exercises   Hip Flexion Supine;10 reps;AAROM;Left   Hip Abduction Supine;10 reps;AAROM;Left   Hip Adduction Supine;10 reps;AAROM;Left   Ankle Pumps Supine;15 reps;AROM;Bilateral   Equipment Use   Comments total assist to don knee immobilizer to L le and then to readjust due to sliding of knee immobilizer downward post ambualtion.   Assessment   Prognosis Fair   Problem List Decreased strength;Decreased range of motion;Decreased endurance;Impaired balance;Decreased mobility;Obesity;Decreased skin integrity;Orthopedic restrictions;Pain  (wbat  L le with knee immobilizer)   Assessment Pt seen for PT treatment session this date with interventions consisting of bed mobility, transfer training, gait training, and HEP, and education provided as needed for safety and direction to improve functional mobility, safety awareness, and activity tolerance. Pt agreeable to PT treatment session upon arrival, pt found supine in bed . At end of session, pt left  seated out of bed in recliner with all needs in reach. In comparison to previous session, pt with improvement in ambulation distances. Pt remains functioning well below baseline and is requiring increased assistance for all aspects of mobility, due to L le and low back pain,  decreased overall strength, balance, mobility,  activity tolerance,   endurance, gait deviations and gait instability.  Pt  requires maximal verbal cues for improved posture, le sequencing,  ue weightbearing techniques, R knee extension, proper hand placement with all transfers.  Pt is easily fatigued.  Pt  ambulated 5' with use of Rw with max assist x2.  Pt  performs b/l le ap x 15 reps,  L le slr and hip abd./add with assistance x 10 reps.  Increased time to perform and complete all mobility and there ex.pt  performs bed mobility with max assist x2 for rolling L and R and mod assist x2 for supine to sit, max assist x2 for transfers sit > stand.  Pt  requires assistance for guidance of hands during transfers and when rolling in bed.    Continue to recommend  level II moderate rehab resource intensity  at time of d/c in order to maximize pt's functional independence and safety w/ mobility. Pt continues to be functioning below baseline level. PT will continue to see pt while here in order to address the deficits listed above and provide interventions consistent w/ POC in effort to achieve STGs.    The patient's AM-[facility] Basic Mobility Inpatient Short Form Raw Score is 8. A raw score less than 16 suggests the patient may benefit from discharge to post-acute rehabilitation services. Please also refer to the recommendation of the Physical Therapist for safe discharge planning.   Goals   Patient Goals Walk a distance   STG Expiration Date 11/29/24   PT Treatment Day 2   Plan   Treatment/Interventions Functional transfer training;LE strengthening/ROM;Therapeutic exercise;Endurance training;Patient/family training;Equipment eval/education;Bed mobility;Gait training;Spoke to nursing  (pca, restorative therapist)   Progress Slow progress, decreased activity tolerance  (pain)   PT Frequency 3-5x/wk   Discharge Recommendation   Rehab Resource Intensity Level, PT II (Moderate Resource Intensity)   AM-PAC Basic Mobility Inpatient   Turning in Flat Bed Without Bedrails 2   Lying on Back to Sitting on  Edge of Flat Bed Without Bedrails 2   Moving Bed to Chair 1   Standing Up From Chair Using Arms 1   Walk in Room 1   Climb 3-5 Stairs With Railing 1   Basic Mobility Inpatient Raw Score 8   [facility] Highest Level Of Mobility   -[facility] Goal 3: Sit at edge of bed   -HL Achieved 4: Move to chair/commode   Education   Education Provided Mobility training;Home exercise program;Assistive device   Patient Reinforcement needed   End of Consult   Patient Position at End of Consult Bedside chair;Bed/Chair alarm activated;All needs within reach   End of Consult Comments b/l susie Bullock, PTA

## 2024-11-20 NOTE — ASSESSMENT & PLAN NOTE
Lab Results   Component Value Date    HGBA1C 7.4 (H) 10/12/2024     Recent Labs     11/19/24  1618 11/19/24  2126 11/20/24  0807 11/20/24  1222   POCGLU 119 152* 171* 204*   Holding Metformin  SSI & acu-checks ac/hs

## 2024-11-20 NOTE — OCCUPATIONAL THERAPY NOTE
Occupational Therapy Evaluation     Patient Name: Mattie Varela  Today's Date: 11/20/2024  Problem List  Principal Problem:    Acute on chronic diastolic congestive heart failure (HCC)  Active Problems:    Type 2 diabetes mellitus with other skin complications (HCC)    Chronic pain disorder    Hypertension    Hypothyroidism    Urinary retention    Stage 3a chronic kidney disease (HCC)    Patella fracture    Loose stools    Insomnia    Past Medical History  Past Medical History:   Diagnosis Date    Acid reflux     Acute kidney injury (HCC) 6/18/2022    Anemia     hx of iron-deficient    Anxiety     Arthritis     Asthma     last needed inhaler last year 2020    Basal cell carcinoma     upper lip    Chronic narcotic dependence (HCC)     Chronic pain     Colon polyp     Cystocele     Diabetes mellitus (HCC)     stable    Disease of thyroid gland     hypothyroidism    Diverticulosis     Dizziness     at times    Dysfunctional uterine bleeding     last assessed - [id number]    Dysphagia     Fibromyalgia     Gastric ulcer     Gastroparesis     History of colonic polyps     last assessed - [id number]    History of gastroesophageal reflux (GERD)     Hypercholesterolemia     Hyperlipidemia     Hypertension     Hyponatremia 1/13/2024    IBS (irritable bowel syndrome)     Pneumobilia 06/18/2022    Post laminectomy syndrome     S/P insertion of spinal cord stimulator 07/18/2018    s/p [employer] loop recorder 3/2/2024 03/02/2024    Seasonal allergies     Shortness of breath     exertional    Spinal stenosis     Status post lumbar spinal fusion 03/16/2018    Stroke ([facility])     pt states slight stroke March 2022     Past Surgical History  Past Surgical History:   Procedure Laterality Date    APPENDECTOMY      BACK SURGERY      BREAST CYST EXCISION Left     CARDIAC CATHETERIZATION  11/14/2023    Procedure: Cardiac catheterization;  Surgeon: Randolph Holt MD;  [facility];  Service: Cardiology    CARDIAC  CATHETERIZATION N/A 11/14/2023    Procedure: Cardiac Coronary Angiogram;  Surgeon: Randolph Holt MD;  [facility];  Service: Cardiology    CARDIAC ELECTROPHYSIOLOGY PROCEDURE N/A 3/2/2024    Procedure: Cardiac loop recorder implant;  Surgeon: Edu Fontaine MD;  [facility];  Service: Cardiology    CHOLECYSTECTOMY      COLONOSCOPY      ESOPHAGOGASTRODUODENOSCOPY N/A 09/28/2016    Procedure: ESOPHAGOGASTRODUODENOSCOPY (EGD);  Surgeon: Mylene Moeller MD;  Location: [facility];  Service:     HERNIA REPAIR      HYSTERECTOMY      TTAH-BSO age 28    LAMINECTOMY      LUMBAR LAMINECTOMY      OOPHORECTOMY      age 28    MI ARTHRODESIS POSTERIOR/PSTLAT TQ 1NTRSPC THORACIC N/A 06/04/2018    Procedure: Reopening of lumbar incision for T12-L5 posterior instrumented fixation and fusion and T12-L4 posterior decompression;  Surgeon: Wood Rogel MD;  Location: [facility];  Service: Neurosurgery    MI COLONOSCOPY FLX DX W/COLLJ SPEC WHEN PFRMD N/A 03/02/2016    Procedure: EGD AND COLONOSCOPY;  Surgeon: Mylene Moeller MD;  [facility]: [facility];  Service: Gastroenterology    MI DILATION ESOPH UNGUIDED SOUND/BOUGIE 1/MULT PASS N/A 09/28/2016    Procedure: DILATATION ESOPHAGEAL;  Surgeon: Mylene Moeller MD;  [facility]: [facility];  Service: Gastroenterology    MI ESOPHAGOGASTRODUODENOSCOPY TRANSORAL DIAGNOSTIC N/A 07/18/2016    Procedure: ESOPHAGOGASTRODUODENOSCOPY (EGD);  Surgeon: Mylene Moeller MD;  [facility];  Service: Gastroenterology    MI INSJ/RPLCMT SPINAL NPG/RCVR POCKET CRTJ&CONNJ Left 06/04/2018    Procedure: removal of left buttock implantable pulse generator and placement of new  implantable pulse generator;  Surgeon: Wood Rogel MD;  [facility];  Service: Neurosurgery        11/20/24 1410   OT Last Visit   OT Visit Date 11/20/24   Note Type   Note type Evaluation   Pain Assessment   Pain Assessment Tool 0-10   Pain Score 10 - Worst Possible Pain   Pain  "Location/Orientation Location: Back   Hospital Pain Intervention(s) Repositioned;Ambulation/increased activity;Emotional support   Restrictions/Precautions   LLE Weight Bearing Per Order WBAT   Braces or Orthoses Knee immobilizer   Other Precautions Contact/isolation;Bed Alarm;Chair Alarm;Fall Risk;Pain   Home Living   Type of Home Assisted living  ([facility])   Home Layout One level   Home Equipment Walker   Prior Function   Level of [place] Independent with ADLs;Independent with functional mobility;Needs assistance with IADLS   Lives With Facility staff   Receives Help From Personal care attendant   IADLs Family/Friend/Other provides transportation;Family/Friend/Other provides meals;Family/Friend/Other provides medication management   Falls in the last 6 months 1 to 4   Vocational Retired   Lifestyle   Autonomy PTA, was (I) with ADLs and required (A) with IADLs. Patient lives in an [facility].   General   Additional Pertinent History Comorbidities affecting pt’s functional performance include a significant PMH of: DM2, chronic pain disorder, HTN, hypothyroidism, CKD3, degenerative lumbar spinal stenosis, s/p insertion of spinal cord stimulator, asthma, diarrhea, CVA, anemia, LE edema, COPD, AMS, generalized edema, acute LB pain, SADAF.  Patient with active OT orders and activity orders for Up as tolerated.   Family/Caregiver Present No   Subjective   Subjective \"I want to stay in the chair to eat\"   ADL   Where Assessed Edge of bed   Eating Assistance 6  Modified independent   Grooming Assistance 5  Supervision/Setup   UB Bathing Assistance 4  Minimal Assistance   LB Bathing Assistance 2  Maximal Assistance   UB Dressing Assistance 4  Minimal Assistance   LB Dressing Assistance 1  Total Assistance   Toileting Assistance  2  Maximal Assistance   Transfers   Sit to Stand 2  Maximal assistance   Additional items Assist x 2;Armrests;Increased time required;Verbal cues;Other  (RW)   Stand to Sit 2  Maximal assistance "   Additional items Assist x 2;Armrests;Increased time required;Verbal cues;Other  (RW)   Functional Mobility   Additional Comments Refused.   Balance   Static Sitting Fair   Static Standing Zero  (Ax2)   Activity Tolerance   Activity Tolerance Patient limited by pain;Patient limited by fatigue;Other (Comment)  (effort)   Nurse Made Aware Yes   RUE Assessment   RUE Assessment WFL   LUE Assessment   LUE Assessment WFL   Hand Function   Gross Motor Coordination Functional   Fine Motor Coordination Functional   Sensation   Light Touch No apparent deficits   Vision-Basic Assessment   Current Vision No visual deficits   Vision - Complex Assessment   Ocular Range of Motion Intact   Acuity Able to read employee name badge without difficulty   Perception   Inattention/Neglect Appears intact   Cognition   Overall Cognitive Status WFL   Arousal/Participation Alert;Responsive;Cooperative   Attention Within functional limits   Orientation Level Oriented X4   Memory Within functional limits   Following Commands Follows one step commands with increased time or repetition   Assessment   Limitation Decreased ADL status;Decreased UE strength;Decreased Safe judgement during ADL;Decreased endurance;Decreased self-care trans;Decreased high-level ADLs  (dec balance, functional reach, coordination)   Prognosis Fair   Assessment Patient is a 79 y.o. year old female seen for OT eval s/p admit to [facility] on 11/17/2024 with acute>chronic diastolic CHF, patella fx - WBAT w/ KI.  OT consulted to assess ADLs/IADLs/functional mobility and assist w/ D/C planning. Patient demonstrates the following deficits impacting occupational performance: decreased strength , decreased balance, decreased activity tolerance, limited functional reach, impaired problem solving, decreased safety awareness, increased pain, orthopedic restrictions, increased body habitus , impaired coordination, decreased cardiovascular endurance, and decreased skin integrity . These  impairments, as well at pt’s difficulty performing ADLs, difficulty performing IADLs, difficulty performing transfers/mobility, limited insight into deficits, decreased initiation and engagement, WBS, fall risk , functional decline , increased reliance on DME , advanced age, and multiple admissions , limit pt’s ability to safely engage in all baseline areas of occupation. Pt's CLOF as follows: eating/grooming: April and supervision , UB ADLs: [place], LB ADLs: maxA and dependent, toileting: maxA, bed mobility: DNT, functional transfers: maxAx2, functional mobility: DNT, sitting/standing tolerance: <1 min. Pt would benefit from continued skilled OT while in acute setting to address deficits as defined above and to maximize (I) w/ ADLs/functional mobility. Occupational performance areas to address include: grooming, bathing/shower, toilet hygiene, dressing, health maintenance, functional mobility, community mobility, clothing management, cleaning, meal prep, and household maintenance. Based on the aforementioned evaluation, functional performance deficits, and assessments, pt has been identified as a high complexity evaluation. At this time, recommendation for pt to receive post-acute rehabilitation services at a Level II (moderate resource intensity) due to above deficits and CLOF. OT will continue to follow pt 3-5x/wk to address the goals listed below to  w/in 10-14 days.   Goals   Patient Goals to sit in the chair and eat   LTG Time Frame 10-14   Plan   Treatment Interventions ADL retraining;UE strengthening/ROM;Functional transfer training;Endurance training;Cognitive reorientation;Patient/family training;Equipment evaluation/education;Neuromuscular reeducation;Compensatory technique education;Continued evaluation;Energy conservation;Activityengagement   Goal Expiration Date 24   OT Treatment Day 0   OT Frequency 3-5x/wk   Discharge Recommendation   Rehab Resource Intensity Level, OT II (Moderate Resource  Intensity)   AM-PAC Daily Activity Inpatient   Lower Body Dressing 1   Bathing 2   Toileting 2   Upper Body Dressing 2   Grooming 3   Eating 4   Daily Activity Raw Score 14   Daily Activity Standardized Score (Calc for Raw Score >=11) 33.39   AM-PAC Applied Cognition Inpatient   Following a Speech/Presentation 4   Understanding Ordinary Conversation 4   Taking Medications 3   Remembering Where Things Are Placed or Put Away 3   Remembering List of 4-5 Errands 3   Taking Care of Complicated Tasks 2   Applied Cognition Raw Score 19   Applied Cognition Standardized Score 39.77     Occupational Therapy goals: In 7-14 days:     1- Patient will verbalize and demonstrate use of energy conservation/deep breathing technique and work simplification skills during functional activity with no verbal cues.   2- Patient will verbalize and demonstrate good body mechanics and joint protection techniques during ADLs/IADLs with no verbal cues   3- Pt will complete bed mobility at a ModA level w/ G balance/safety demonstrated to decrease caregiver assistance required   4- Patient will increase OOB/ sitting tolerance to 2-4 hours per day for increased participation in self care and leisure tasks with no s/s of exertion.   5-Patient will increase standing tolerance time to 5 minutes with unilateral UE support to complete sink level ADLs@ Mynor level    6- Pt will improve functional transfers to [place] on/off all surfaces using DME as needed w/ G balance/safety   7- Patient will complete UB ADLs with s/u utilizing appropriate DME/AE PRN   8- Patient will complete LB ADLs with modA utilizing appropriate DME/AE PRN   9- Patient will complete toileting tasks with modA with G hygiene/thoroughness utilizing appropriate DME/AE PRN   10- Pt will improve functional mobility during ADL/IADL/leisure tasks to [place] using DME as needed w/ G balance/safety    11- Pt will be attentive 100% of the time during ongoing cognitive assessment w/ G participation  to assist w/ safe d/c planning/recommendations   12- Pt will participate in simulated IADL management task to increase independence to Mod I w/ G safety and endurance   13- Pt will increase BUE strength by 1MM grade via AROM/AAROM/PROM exercises to increase independence in ADLs and transfers       Teresa Gutierrez, OTR/L

## 2024-11-20 NOTE — ASSESSMENT & PLAN NOTE
- 11/19/24: C.diff pcr negative  - rapid flu negative on admission   - can consider viral panel with increased pleuritic pain, mucous, and sore throat

## 2024-11-20 NOTE — PLAN OF CARE
Problem: PHYSICAL THERAPY ADULT  Goal: Performs mobility at highest level of function for planned discharge setting.  See evaluation for individualized goals.  Description: Treatment/Interventions: Functional transfer training, LE strengthening/ROM, Therapeutic exercise, Endurance training, Patient/family training, Equipment eval/education, Bed mobility, Gait training, Continued evaluation, Spoke to nursing  Equipment Recommended: Walker (pt owns RW at home for use)       See flowsheet documentation for full assessment, interventions and recommendations.  Outcome: Progressing  Note: Prognosis: Fair  Problem List: Decreased strength, Decreased range of motion, Decreased endurance, Impaired balance, Decreased mobility, Obesity, Decreased skin integrity, Orthopedic restrictions, Pain (wbat  L le with knee immobilizer)  Assessment: Pt seen for PT treatment session this date with interventions consisting of bed mobility, transfer training, gait training, and HEP, and education provided as needed for safety and direction to improve functional mobility, safety awareness, and activity tolerance. Pt agreeable to PT treatment session upon arrival, pt found supine in bed . At end of session, pt left  seated out of bed in recliner with all needs in reach. In comparison to previous session, pt with improvement in ambulation distances. Pt remains functioning well below baseline and is requiring increased assistance for all aspects of mobility, due to L le and low back pain,  decreased overall strength, balance, mobility,  activity tolerance,  endurance, gait deviations and gait instability.  Pt  requires maximal verbal cues for improved posture, le sequencing,  ue weightbearing techniques, R knee extension, proper hand placement with all transfers.  Pt is easily fatigued.  Pt  ambulated 5' with use of Rw with max assist x2.  Pt  performs b/l le ap x 15 reps,  L le slr and hip abd./add with assistance x 10 reps.  Increased time  to perform and complete all mobility and there ex.pt  performs bed mobility with max assist x2 for rolling L and R and mod assist x2 for supine to sit, max assist x2 for transfers sit > stand.  Pt  requires assistance for guidance of hands during transfers and when rolling in bed.    Continue to recommend  level II moderate rehab resource intensity  at time of d/c in order to maximize pt's functional independence and safety w/ mobility. Pt continues to be functioning below baseline level. PT will continue to see pt while here in order to address the deficits listed above and provide interventions consistent w/ POC in effort to achieve STGs.    The patient's AM-PAC Basic Mobility Inpatient Short Form Raw Score is 8. A raw score less than 16 suggests the patient may benefit from discharge to post-acute rehabilitation services. Please also refer to the recommendation of the Physical Therapist for safe discharge planning.        Rehab Resource Intensity Level, PT: II (Moderate Resource Intensity)    See flowsheet documentation for full assessment.

## 2024-11-20 NOTE — ASSESSMENT & PLAN NOTE
Wt Readings from Last 3 Encounters:   11/20/24 79.8 kg (175 lb 14.8 oz)   10/18/24 74.3 kg (163 lb 12.8 oz)   10/02/24 85.5 kg (188 lb 9.6 oz)   79 year old female presented with shortness of breath, lower extremity edema, volume overload, and weight gain secondary to acute on chronic diastolic congestive heart failure.  Takes torsemide 30 mg daily  Receiving IV lasix 40 mg bid --> increased to 60 mg BID  Continue metoprolol 50 mg twice daily  Daily weights, I&Os, Na/fluid restriction-we continue to decrease  Down about 5.0 L, admission weight 194 pounds, current weight 175 pounds  Cardiology price checking [facility]-$25 per day-unable to afford  Ongoing recommendations cardiology-likely transition to oral diuretics tomorrow

## 2024-11-20 NOTE — ASSESSMENT & PLAN NOTE
- TTE 05/23/24: LVEF 70%, grade I diastolic dysfunction, mild LVOT obstruction, moderate annular calcification, mild TR   - BNP 11/17/24: 264 (BNP 05/22/24 was 302)  - CXR 11/17/24: no acute cardiopulmonary disease   - about   - Neurohormonal Blockade:   -- beta blocker: Toprol-XL 50 mg BID  -- ACE/ARB/ARNi: none  -- aldosterone antagonist: none   -- SGLT2: price checked jardiance and would be $25 a day. No affordable  -- diuretic: torsemide 30 mg daily  -- inpatient diuretic: IV lasix 60 mg BID

## 2024-11-21 ENCOUNTER — APPOINTMENT (INPATIENT)
Dept: CT IMAGING | Facility: HOSPITAL | Age: 79
DRG: 562 | End: 2024-11-21
Payer: MEDICARE

## 2024-11-21 LAB
ALBUMIN SERPL BCG-MCNC: 3.2 G/DL (ref 3.5–5)
ALP SERPL-CCNC: 81 U/L (ref 34–104)
ALT SERPL W P-5'-P-CCNC: 5 U/L (ref 7–52)
ANION GAP SERPL CALCULATED.3IONS-SCNC: 10 MMOL/L (ref 4–13)
AST SERPL W P-5'-P-CCNC: 25 U/L (ref 13–39)
BILIRUB SERPL-MCNC: 0.59 MG/DL (ref 0.2–1)
BUN SERPL-MCNC: 24 MG/DL (ref 5–25)
C COLI+JEJUNI TUF STL QL NAA+PROBE: NEGATIVE
CALCIUM ALBUM COR SERPL-MCNC: 10 MG/DL (ref 8.3–10.1)
CALCIUM SERPL-MCNC: 9.4 MG/DL (ref 8.4–10.2)
CARDIAC TROPONIN I PNL SERPL HS: 22 NG/L (ref 8–18)
CHLORIDE SERPL-SCNC: 95 MMOL/L (ref 96–108)
CO2 SERPL-SCNC: 29 MMOL/L (ref 21–32)
CREAT SERPL-MCNC: 1.48 MG/DL (ref 0.6–1.3)
CRP SERPL QL: 127.2 MG/L
EC STX1+STX2 GENES STL QL NAA+PROBE: NEGATIVE
ERYTHROCYTE [DISTWIDTH] IN BLOOD BY AUTOMATED COUNT: 14.1 % (ref 11.6–15.1)
GFR SERPL CREATININE-BSD FRML MDRD: 32 ML/MIN/1.73SQ M
GLUCOSE SERPL-MCNC: 127 MG/DL (ref 65–140)
GLUCOSE SERPL-MCNC: 152 MG/DL (ref 65–140)
GLUCOSE SERPL-MCNC: 162 MG/DL (ref 65–140)
GLUCOSE SERPL-MCNC: 292 MG/DL (ref 65–140)
HCT VFR BLD AUTO: 36.3 % (ref 34.8–46.1)
HGB BLD-MCNC: 11.4 G/DL (ref 11.5–15.4)
MCH RBC QN AUTO: 29.1 PG (ref 26.8–34.3)
MCHC RBC AUTO-ENTMCNC: 31.4 G/DL (ref 31.4–37.4)
MCV RBC AUTO: 93 FL (ref 82–98)
PLATELET # BLD AUTO: 256 THOUSANDS/UL (ref 149–390)
PMV BLD AUTO: 10 FL (ref 8.9–12.7)
POTASSIUM SERPL-SCNC: 3.7 MMOL/L (ref 3.5–5.3)
PROT SERPL-MCNC: 6.7 G/DL (ref 6.4–8.4)
RBC # BLD AUTO: 3.92 MILLION/UL (ref 3.81–5.12)
SALMONELLA SP SPAO STL QL NAA+PROBE: NEGATIVE
SHIGELLA SP+EIEC IPAH STL QL NAA+PROBE: NEGATIVE
SODIUM SERPL-SCNC: 134 MMOL/L (ref 135–147)
WBC # BLD AUTO: 9.95 THOUSAND/UL (ref 4.31–10.16)

## 2024-11-21 PROCEDURE — 97110 THERAPEUTIC EXERCISES: CPT

## 2024-11-21 PROCEDURE — 82948 REAGENT STRIP/BLOOD GLUCOSE: CPT

## 2024-11-21 PROCEDURE — 94760 N-INVAS EAR/PLS OXIMETRY 1: CPT

## 2024-11-21 PROCEDURE — 85027 COMPLETE CBC AUTOMATED: CPT | Performed by: PHYSICIAN ASSISTANT

## 2024-11-21 PROCEDURE — 74177 CT ABD & PELVIS W/CONTRAST: CPT

## 2024-11-21 PROCEDURE — 71275 CT ANGIOGRAPHY CHEST: CPT

## 2024-11-21 PROCEDURE — 84484 ASSAY OF TROPONIN QUANT: CPT | Performed by: PHYSICIAN ASSISTANT

## 2024-11-21 PROCEDURE — 97530 THERAPEUTIC ACTIVITIES: CPT

## 2024-11-21 PROCEDURE — 99232 SBSQ HOSP IP/OBS MODERATE 35: CPT | Performed by: INTERNAL MEDICINE

## 2024-11-21 PROCEDURE — 94640 AIRWAY INHALATION TREATMENT: CPT

## 2024-11-21 PROCEDURE — 86140 C-REACTIVE PROTEIN: CPT | Performed by: INTERNAL MEDICINE

## 2024-11-21 PROCEDURE — 99233 SBSQ HOSP IP/OBS HIGH 50: CPT | Performed by: PHYSICIAN ASSISTANT

## 2024-11-21 PROCEDURE — 80053 COMPREHEN METABOLIC PANEL: CPT | Performed by: PHYSICIAN ASSISTANT

## 2024-11-21 RX ORDER — ENOXAPARIN SODIUM 100 MG/ML
30 INJECTION SUBCUTANEOUS DAILY
Status: DISCONTINUED | OUTPATIENT
Start: 2024-11-22 | End: 2024-11-22

## 2024-11-21 RX ORDER — DIPHENHYDRAMINE HYDROCHLORIDE 50 MG/ML
50 INJECTION INTRAMUSCULAR; INTRAVENOUS ONCE
Status: COMPLETED | OUTPATIENT
Start: 2024-11-21 | End: 2024-11-21

## 2024-11-21 RX ORDER — CIPROFLOXACIN 2 MG/ML
400 INJECTION, SOLUTION INTRAVENOUS EVERY 24 HOURS
Status: DISCONTINUED | OUTPATIENT
Start: 2024-11-21 | End: 2024-11-24

## 2024-11-21 RX ORDER — METRONIDAZOLE 500 MG/100ML
500 INJECTION, SOLUTION INTRAVENOUS EVERY 8 HOURS
Status: DISCONTINUED | OUTPATIENT
Start: 2024-11-21 | End: 2024-11-25

## 2024-11-21 RX ORDER — INSULIN LISPRO 100 [IU]/ML
1-6 INJECTION, SOLUTION INTRAVENOUS; SUBCUTANEOUS
Status: DISCONTINUED | OUTPATIENT
Start: 2024-11-22 | End: 2024-12-02 | Stop reason: HOSPADM

## 2024-11-21 RX ORDER — SODIUM CHLORIDE 9 MG/ML
50 INJECTION, SOLUTION INTRAVENOUS CONTINUOUS
Status: DISPENSED | OUTPATIENT
Start: 2024-11-21 | End: 2024-11-21

## 2024-11-21 RX ORDER — HYDROCORTISONE SODIUM SUCCINATE 100 MG/2ML
100 INJECTION INTRAMUSCULAR; INTRAVENOUS ONCE
Status: DISCONTINUED | OUTPATIENT
Start: 2024-11-21 | End: 2024-11-21

## 2024-11-21 RX ADMIN — DIPHENHYDRAMINE HYDROCHLORIDE 50 MG: 50 INJECTION, SOLUTION INTRAMUSCULAR; INTRAVENOUS at 15:38

## 2024-11-21 RX ADMIN — BACLOFEN 10 MG: 10 TABLET ORAL at 08:13

## 2024-11-21 RX ADMIN — LATANOPROST 1 DROP: 50 SOLUTION OPHTHALMIC at 21:58

## 2024-11-21 RX ADMIN — SODIUM CHLORIDE 50 ML/HR: 0.9 INJECTION, SOLUTION INTRAVENOUS at 11:47

## 2024-11-21 RX ADMIN — LEVALBUTEROL HYDROCHLORIDE 1.25 MG: 1.25 SOLUTION RESPIRATORY (INHALATION) at 19:29

## 2024-11-21 RX ADMIN — CIPROFLOXACIN 400 MG: 2 INJECTION, SOLUTION INTRAVENOUS at 19:43

## 2024-11-21 RX ADMIN — GUAIFENESIN 600 MG: 600 TABLET, EXTENDED RELEASE ORAL at 21:58

## 2024-11-21 RX ADMIN — SUCRALFATE 1 G: 1 TABLET ORAL at 06:17

## 2024-11-21 RX ADMIN — ENOXAPARIN SODIUM 40 MG: 40 INJECTION SUBCUTANEOUS at 08:13

## 2024-11-21 RX ADMIN — GUAIFENESIN 600 MG: 600 TABLET, EXTENDED RELEASE ORAL at 08:13

## 2024-11-21 RX ADMIN — HYDROCORTISONE SODIUM SUCCINATE 200 MG: 250 INJECTION, POWDER, FOR SOLUTION INTRAMUSCULAR; INTRAVENOUS at 12:01

## 2024-11-21 RX ADMIN — DIPHENHYDRAMINE HYDROCHLORIDE 50 MG: 50 INJECTION, SOLUTION INTRAMUSCULAR; INTRAVENOUS at 11:47

## 2024-11-21 RX ADMIN — GABAPENTIN 100 MG: 100 CAPSULE ORAL at 21:58

## 2024-11-21 RX ADMIN — LEVALBUTEROL HYDROCHLORIDE 1.25 MG: 1.25 SOLUTION RESPIRATORY (INHALATION) at 07:25

## 2024-11-21 RX ADMIN — METRONIDAZOLE 500 MG: 5 INJECTION, SOLUTION INTRAVENOUS at 19:43

## 2024-11-21 RX ADMIN — LEVALBUTEROL HYDROCHLORIDE 1.25 MG: 1.25 SOLUTION RESPIRATORY (INHALATION) at 13:14

## 2024-11-21 RX ADMIN — LEVOTHYROXINE SODIUM 37.5 MCG: 75 TABLET ORAL at 06:17

## 2024-11-21 RX ADMIN — MELATONIN 3 MG: 3 TAB ORAL at 21:58

## 2024-11-21 RX ADMIN — LOPERAMIDE HYDROCHLORIDE 2 MG: 2 CAPSULE ORAL at 19:18

## 2024-11-21 RX ADMIN — BACLOFEN 10 MG: 10 TABLET ORAL at 21:57

## 2024-11-21 RX ADMIN — TORSEMIDE 30 MG: 10 TABLET ORAL at 08:13

## 2024-11-21 RX ADMIN — LOPERAMIDE HYDROCHLORIDE 2 MG: 2 CAPSULE ORAL at 08:16

## 2024-11-21 RX ADMIN — ASPIRIN 81 MG CHEWABLE TABLET 81 MG: 81 TABLET CHEWABLE at 08:13

## 2024-11-21 RX ADMIN — PANTOPRAZOLE SODIUM 40 MG: 40 TABLET, DELAYED RELEASE ORAL at 06:17

## 2024-11-21 RX ADMIN — IOHEXOL 100 ML: 350 INJECTION, SOLUTION INTRAVENOUS at 16:36

## 2024-11-21 RX ADMIN — OXYCODONE HYDROCHLORIDE 10 MG: 10 TABLET ORAL at 08:16

## 2024-11-21 RX ADMIN — METOPROLOL SUCCINATE 50 MG: 50 TABLET, EXTENDED RELEASE ORAL at 08:13

## 2024-11-21 RX ADMIN — FERROUS SULFATE TAB 325 MG (65 MG ELEMENTAL FE) 325 MG: 325 (65 FE) TAB at 08:13

## 2024-11-21 NOTE — PROGRESS NOTES
Problem: Discharge Planning  Goal: Discharge to home or other facility with appropriate resources  9/4/5821 3149 by Woodrow Blair RN  Outcome: Completed  0/6/6783 2052 by Woodrow Blair RN  Outcome: Progressing     Problem: Pain  Goal: Verbalizes/displays adequate comfort level or baseline comfort level  4/1/8885 3843 by Woodrow Blair RN  Outcome: Completed  6/1/9313 2129 by Woodrow Blair RN  Outcome: Progressing     Problem: ABCDS Injury Assessment  Goal: Absence of physical injury  1/9/9647 6743 by Woodrow Blair RN  Outcome: Completed  6/5/9620 9200 by Woodrow Blair RN  Outcome: Progressing     Problem: Skin/Tissue Integrity - Adult  Goal: Skin integrity remains intact  9/1/6387 1094 by Woodrow Blair RN  Outcome: Completed  7/3/3412 5384 by Woodrow Blair RN  Outcome: Progressing     Problem: Safety - Adult  Goal: Free from fall injury  2/8/8668 7661 by Woodrow Blair RN  Outcome: Completed  8/6/5557 5161 by Woodrow Blair RN  Outcome: Progressing Progress Note - Cardiology   Name: Mtatie Varela 79 y.o. female I MRN: [id number]  Unit/Bed#: E4 -01 I Date of Admission: 11/17/2024   Date of Service: 11/21/2024 I Hospital Day: 4    Assessment & Plan  Acute on chronic diastolic congestive heart failure (HCC)   - TTE 05/23/24: LVEF 70%, grade I diastolic dysfunction, mild LVOT obstruction, moderate annular calcification, mild TR   - BNP 11/17/24: 264 (BNP 05/22/24 was 302)  - CXR 11/17/24: no acute cardiopulmonary disease    -Home meds: torsemide 30 mg PO BID  - inpatient diuretic: She was on IV lasix 60 mg BID  Hypertension  - BP elevated on admission with systolic 115-162 mmHg  - home regimen: Toprol-XL 50 mg BID  Type 2 diabetes mellitus with other skin complications ([facility])  - A1c 10/12/24: 7.4  - Home meds metformin 500 mg PO BID     Plan:  Insulin lispro and SSI  Hold while inpatient   Consider switching to another antidiabetic meds considering her low eGFR (32) and high creatinine.     Stage 3a chronic kidney disease (HCC)  - follows with outpatient nephrology  - baseline creatinine ~1.3-1.4, GFR today 32, down trending likely in the setting of poor PO intake vs diarrhea vs Cardiorenal syndrome vs diuretics  -Avoid nephrotoxic agents  Patella fracture  - XR 11/15/24: showing smooth linear lucencies coursing through the superior lateral aspect of the patella, nondisplaced fracture difficult to exclude   - pending CT of lower extremity   - orthopedics following  Urinary retention  - history of urinary retention with spine stimulator in place   - continues to have urinary retention requiring [place] catheter   - need outpatient voiding trial  Hypothyroidism  - continue levothyroxine   Loose stools  - 11/19/24: C.diff pcr negative  - rapid flu negative on admission   - can consider viral panel with increased pleuritic pain, mucous, and sore throat   Chronic pain disorder    Insomnia      Plan:  -Continue to diurese on current regimen.   -Current wt 176  pounds per bed scale.   -Creatinine 1.48 today trending up, likely due to poor PO intake vs diarrhea vs diuresis vs cardiorenal syndrome.   -Labs today shows Na 134, K 3.7 Cr 1.47, GFR 32. Her dry weight is 166 lbs.  - Continue torsemide 30 mg PO BID  -Consider holding Torsemide and put her mIVF if she continues to have difficulty PO intake.  -Avoid nephrotoxic agents  - AM CMP  - strict I/O's and daily weights   - Continue ASA 81 mg daily, atorvastatin 40 mg daily and Toprol-XL 50 mg BID  - Stool PCR pending    Subjective:  Pt seen and examined at bedside during rounds. No significant acute events overnight. She states that she slept well overnight. She still has diarrhea and poor appetite. She states it still hurts to take a deep breath in. Frustrated that she has not been able to get up an walk the hallways and as well as why this is happening to her. Current mgt plan was discussed with her and she is agreeable to it.    Vitals:  Vitals:    11/20/24 0600 11/21/24 0600   Weight: 79.8 kg (175 lb 14.8 oz) 80 kg (176 lb 5.9 oz)   ,  Vitals:    11/21/24 0630 11/21/24 0725 11/21/24 0801 11/21/24 0937   BP: 113/67  121/59    BP Location: Left arm  Left arm    Pulse: 74  77    Resp:   18    Temp: (!) 97.2 °F (36.2 °C)  (!) 97.3 °F (36.3 °C) 99.8 °F (37.7 °C)   TempSrc: Temporal  Temporal Rectal   SpO2:  95% 96%    Weight:       Height:           Exam:  Physical Exam  Vitals and nursing note reviewed.   Constitutional:       General: She is not in acute distress.     Appearance: Normal appearance. She is not ill-appearing.   HENT:      Head: Normocephalic and atraumatic.      Nose: Nose normal.      Mouth/Throat:      Mouth: Mucous membranes are moist.   Eyes:      General: No scleral icterus.  Neck:      Vascular: No JVD.   Cardiovascular:      Rate and Rhythm: Normal rate and regular rhythm.      Pulses:           Radial pulses are 2+ on the right side and 2+ on the left side.      Heart sounds: Murmur heard.       Systolic murmur is present with a grade of 2/6.   Pulmonary:      Effort: Pulmonary effort is normal. No respiratory distress.      Breath sounds: Normal breath sounds. No stridor. No wheezing, rhonchi or rales.   Abdominal:      General: Abdomen is flat.   Musculoskeletal:         General: No swelling. Normal range of motion.      Cervical back: Normal range of motion.      Right lower leg: No edema.      Left lower leg: No edema.   Skin:     General: Skin is warm and dry.      Capillary Refill: Capillary refill takes less than 2 seconds.   Neurological:      Mental Status: She is alert. Mental status is at baseline.   Psychiatric:         Thought Content: Thought content normal.      Medications:    Current Facility-Administered Medications:   •  acetaminophen (TYLENOL) tablet 650 mg, 650 mg, Oral, Q6H PRN, Paco Carmona MD  •  albuterol (PROVENTIL HFA,VENTOLIN HFA) inhaler 2 puff, 2 puff, Inhalation, Q6H PRN, Paco Caromna MD  •  aluminum-magnesium hydroxide-simethicone (MAALOX) oral suspension 30 mL, 30 mL, Oral, Q4H PRN, Krystina Preston PA-C, 30 mL at 11/19/24 2159  •  aspirin chewable tablet 81 mg, 81 mg, Oral, Daily, Paco Carmona MD, 81 mg at 11/21/24 0813  •  atorvastatin (LIPITOR) tablet 40 mg, 40 mg, Oral, Daily With Dinner, Paco Carmona MD, 40 mg at 11/20/24 1614  •  baclofen tablet 10 mg, 10 mg, Oral, BID, Paco Carmona MD, 10 mg at 11/21/24 0813  •  enoxaparin (LOVENOX) subcutaneous injection 40 mg, 40 mg, Subcutaneous, Daily, Paco Carmona MD, 40 mg at 11/21/24 0813  •  ferrous sulfate tablet 325 mg, 325 mg, Oral, Daily With Breakfast, Paco Carmona MD, 325 mg at 11/21/24 0813  •  gabapentin (NEURONTIN) capsule 100 mg, 100 mg, Oral, HS, Krystina Preston PA-C, 100 mg at 11/20/24 2123  •  guaiFENesin (MUCINEX) 12 hr tablet 600 mg, 600 mg, Oral, Q12H [facility], Bi Luke PA-C, 600 mg at 11/21/24 0813  •  insulin lispro (HumALOG/ADMELOG) 100 units/mL subcutaneous injection 1-6 Units,  1-6 Units, Subcutaneous, TID AC, 2 Units at 11/20/24 1223 **AND** Fingerstick Glucose (POCT), , , TID AC, Paco Carmona MD  •  latanoprost (XALATAN) 0.005 % ophthalmic solution 1 drop, 1 drop, Both Eyes, HS, Paco Carmona MD, 1 drop at 11/20/24 2124  •  levalbuterol (XOPENEX) inhalation solution 1.25 mg, 1.25 mg, Nebulization, TID, Susannah Mcclain PA-C, 1.25 mg at 11/21/24 0725  •  levothyroxine tablet 37.5 mcg, 37.5 mcg, Oral, Early Morning, Paco Carmona MD, 37.5 mcg at 11/21/24 0617  •  lidocaine (LIDODERM) 5 % patch 1 patch, 1 patch, Topical, Daily, Perlita Torres MD, 1 patch at 11/20/24 0805  •  loperamide (IMODIUM) capsule 2 mg, 2 mg, Oral, 4x Daily PRN, Perlita Torres MD, 2 mg at 11/21/24 0816  •  meclizine (ANTIVERT) tablet 12.5 mg, 12.5 mg, Oral, Daily PRN, Paco Carmona MD  •  melatonin tablet 3 mg, 3 mg, Oral, HS PRN, Bi Luke PA-C, 3 mg at 11/19/24 0014  •  metoprolol succinate (TOPROL-XL) 24 hr tablet 50 mg, 50 mg, Oral, Q12H ARY, Paco Carmona MD, 50 mg at 11/21/24 0813  •  Milnacipran HCl TABS 100 mg, 1 tablet, Oral, Daily, Paco Carmona MD  •  oxyCODONE (ROXICODONE) immediate release tablet 10 mg, 10 mg, Oral, Q6H PRN, Krystina Preston PA-C, 10 mg at 11/21/24 0816  •  oxyCODONE (ROXICODONE) IR tablet 5 mg, 5 mg, Oral, Q6H PRN, Krystina Preston PA-C, 5 mg at 11/19/24 0931  •  pantoprazole (PROTONIX) EC tablet 40 mg, 40 mg, Oral, Early Morning, Paco Carmona MD, 40 mg at 11/21/24 0617  •  sucralfate (CARAFATE) tablet 1 g, 1 g, Oral, BID AC, Krystina Preston PA-C, 1 g at 11/21/24 0617  •  torsemide (DEMADEX) tablet 30 mg, 30 mg, Oral, BID, Rukhsana Dasilva PA-C, 30 mg at 11/21/24 0813  •  zolpidem (AMBIEN) tablet 5 mg, 5 mg, Oral, HS PRN, Susannah Mcclain PA-C, 5 mg at 11/20/24 2123    Labs/Data:        Results from last 7 days   Lab Units 11/21/24  0929 11/19/24  0551 11/18/24  0526   WBC Thousand/uL 9.95 9.02 5.74   HEMOGLOBIN g/dL 11.4* 10.7*  9.2*   HEMATOCRIT % 36.3 33.6* 29.3*   PLATELETS Thousands/uL 256 253 235     Results from last 7 days   Lab Units 11/21/24  0929 11/20/24  1135 11/19/24  0551   POTASSIUM mmol/L 3.7 3.7 3.9   CHLORIDE mmol/L 95* 94* 99   CO2 mmol/L 29 30 29   BUN mg/dL 24 19 22   CREATININE mg/dL 1.48* 1.36* 1.23     Celso Dotson MD

## 2024-11-21 NOTE — ASSESSMENT & PLAN NOTE
Lab Results   Component Value Date    HGBA1C 7.4 (H) 10/12/2024     Recent Labs     11/20/24  1602 11/20/24  2110 11/21/24  0758 11/21/24  1219   POCGLU 187* 176* 127 162*   Holding Metformin  Continue insulin sliding scale

## 2024-11-21 NOTE — ASSESSMENT & PLAN NOTE
Complains of ongoing pain with inspiration  Obtained EKG yesterday without signs of acute ischemia  Check stat troponin now-although lower suspicion for acute ACS at this time   Chest x-ray from yesterday with out pneumonia, effusion, pneumothorax  Patient with risk factors for PE of immobility, recent fracture  Noted to have some tachycardia yesterday and now requiring 1 L oxygen which is new  We will obtain stat CTA chest to rule out acute PE

## 2024-11-21 NOTE — PLAN OF CARE
Problem: PHYSICAL THERAPY ADULT  Goal: Performs mobility at highest level of function for planned discharge setting.  See evaluation for individualized goals.  Description: Treatment/Interventions: Functional transfer training, LE strengthening/ROM, Therapeutic exercise, Endurance training, Patient/family training, Equipment eval/education, Bed mobility, Gait training, Continued evaluation, Spoke to nursing  Equipment Recommended: Walker (pt owns RW at home for use)       See flowsheet documentation for full assessment, interventions and recommendations.  Outcome: Progressing  Note: Prognosis: Fair  Problem List: Decreased strength, Decreased range of motion, Decreased endurance, Impaired balance, Decreased mobility, Obesity, Pain, Decreased skin integrity, Orthopedic restrictions (wbat  L le with knee immobilizer)  Assessment: Pt seen for PT treatment session this date with interventions consisting of bed mobility and HEP, and education provided as needed for safety and direction to improve functional mobility, safety awareness, and activity tolerance. Pt agreeable to PT treatment session upon arrival, pt found supine in bed sleeping . At end of session, pt left supine in bed with all needs in reach. In comparison to previous session, pt with improvement in activity tolerance. Pt  easily arouseable, but groggy,  Pt dozing on and off to sleep t/o PT session.  Pt required increased verbal and or tactile stim to maintain level of alertness.  Pt  performed supine b/l le arom to R le and  a-aarom to L le. Knee immobilizer donned with total assist prior to L le exercises.  Pt tolerated increased reps to 12- 20 reps  pt  performs rolling R and L with max assist x1 with use of bed rails with assist to guide ue's  to reach for rails.  Pt tolerated fair. Pt  declined out of bed transfers and mobility at this time due to increased back pain. Continue to recommend  level II moderated rehab resource intensity  at time of d/c  in order to maximize pt's functional independence and safety w/ mobility. Pt continues to be functioning below baseline level. PT will continue to see pt while here in order to address the deficits listed above and provide interventions consistent w/ POC in effort to achieve STGs.    The patient's AM-PAC Basic Mobility Inpatient Short Form Raw Score is 8. A raw score less than 16 suggests the patient may benefit from discharge to post-acute rehabilitation services. Please also refer to the recommendation of the Physical Therapist for safe discharge planning.        Rehab Resource Intensity Level, PT: II (Moderate Resource Intensity)    See flowsheet documentation for full assessment.

## 2024-11-21 NOTE — ASSESSMENT & PLAN NOTE
- follows with outpatient nephrology  - baseline creatinine ~1.3-1.4, GFR today 32, down trending likely in the setting of poor PO intake vs diarrhea vs Cardiorenal syndrome vs diuretics  -Avoid nephrotoxic agents

## 2024-11-21 NOTE — ASSESSMENT & PLAN NOTE
Lab Results   Component Value Date    EGFR 32 11/21/2024    EGFR 36 11/20/2024    EGFR 41 11/19/2024    CREATININE 1.48 (H) 11/21/2024    CREATININE 1.36 (H) 11/20/2024    CREATININE 1.23 11/19/2024   Baseline around 1.3   Monitor closely with recent diuresis and now need for IV contrast

## 2024-11-21 NOTE — PHYSICAL THERAPY NOTE
PHYSICAL THERAPY NOTE          Patient Name: Mattie Varela  Today's Date: 11/21/2024 11/21/24 7242   Note Type   Note Type Treatment   Pain Assessment   Pain Assessment Tool 0-10   Pain Score 10 - Worst Possible Pain   Pain Location/Orientation Location: Head;Location: Back  (from head all the way down spine when pt; lifts head)   Hospital Pain Intervention(s) Ambulation/increased activity;Emotional support;Rest   Restrictions/Precautions   LLE Weight Bearing Per Order WBAT   Braces or Orthoses Knee immobilizer  (L Le)   Other Precautions Contact/isolation;Chair Alarm;Bed Alarm;WBS;Pain;Fall Risk;O2   General   Chart Reviewed Yes   Family/Caregiver Present No   Cognition   Arousal/Participation Responsive;Lethargic   Attention Attends with cues to redirect   Orientation Level Oriented to person;Oriented to place  (oriented to month, year and not day)   Following Commands Follows one step commands with increased time or repetition   Subjective   Subjective I have pain from my head all the down my spine when I lift my head.   Bed Mobility   Rolling R 2  Maximal assistance   Additional items Assist x 1;Bedrails;Increased time required;Verbal cues;LE management   Rolling L 2  Maximal assistance   Additional items Assist x 1;Bedrails;Increased time required;Verbal cues;LE management   Additional Comments pt  declined sitting eob and or OOB this session due to increased pain.   Endurance Deficit   Endurance Deficit Description pain, generalized weakness, lethargy   Activity Tolerance   Activity Tolerance Patient limited by pain   Exercises   Quad Sets Supine;AROM;Bilateral  (x 12 reps)   Heelslides Supine;AROM;Right  (x 12 reps)   Hip Flexion Supine;AROM;Right  (and aarom L le with knee immobilizer on. x 12 reps)   Hip Abduction Supine;AAROM;Left  (x 12 reps. arom R le)   Hip Adduction Supine;AAROM;Left  (x 12 reps.  arom r le.)   Knee  AROM Short Arc Quad Supine;AROM;Right  (x 12 reps)   Ankle Pumps Supine;20 reps;AROM;Bilateral   Equipment Use   Comments total assist to don knee immobilizer to L le.   Assessment   Prognosis Fair   Problem List Decreased strength;Decreased range of motion;Decreased endurance;Impaired balance;Decreased mobility;Obesity;Pain;Decreased skin integrity;Orthopedic restrictions  (wbat  L le with knee immobilizer)   Assessment Pt seen for PT treatment session this date with interventions consisting of bed mobility and HEP, and education provided as needed for safety and direction to improve functional mobility, safety awareness, and activity tolerance. Pt agreeable to PT treatment session upon arrival, pt found supine in bed sleeping . At end of session, pt left supine in bed with all needs in reach. In comparison to previous session, pt with improvement in activity tolerance. Pt  easily arouseable, but groggy,  Pt dozing on and off to sleep t/o PT session.  Pt required increased verbal and or tactile stim to maintain level of alertness.  Pt  performed supine b/l le arom to R le and  a-aarom to L le. Knee immobilizer donned with total assist prior to L le exercises.  Pt tolerated increased reps to 12- 20 reps  pt  performs rolling R and L with max assist x1 with use of bed rails with assist to guide ue's  to reach for rails.  Pt tolerated fair. Pt  declined out of bed transfers and mobility at this time due to increased back pain. Continue to recommend  level II moderated rehab resource intensity  at time of d/c in order to maximize pt's functional independence and safety w/ mobility. Pt continues to be functioning below baseline level. PT will continue to see pt while here in order to address the deficits listed above and provide interventions consistent w/ POC in effort to achieve STGs.    The patient's AM-PAC Basic Mobility Inpatient Short Form Raw Score is 8. A raw score less than 16 suggests the patient may benefit  from discharge to post-acute rehabilitation services. Please also refer to the recommendation of the Physical Therapist for safe discharge planning.   Goals   Patient Goals to find out why I have so much pain   STG Expiration Date 11/29/24   PT Treatment Day 3   Plan   Treatment/Interventions LE strengthening/ROM;Therapeutic exercise;Bed mobility   Progress Slow progress, decreased activity tolerance  (pain,)   PT Frequency 3-5x/wk   Discharge Recommendation   Rehab Resource Intensity Level, PT II (Moderate Resource Intensity)   AM-PAC Basic Mobility Inpatient   Turning in Flat Bed Without Bedrails 2   Lying on Back to Sitting on Edge of Flat Bed Without Bedrails 2   Moving Bed to Chair 1   Standing Up From Chair Using Arms 1   Walk in Room 1   Climb 3-5 Stairs With Railing 1   Basic Mobility Inpatient Raw Score 8   Turning Head Towards Sound 3   Follow Simple Instructions 3   Low Function Basic Mobility Raw Score  14   Low Function Basic Mobility Standardized Score  22.01   [facility] Highest Level Of Mobility   JH-HLM Goal 3: Sit at edge of bed   JH-HLM Achieved 2: Bed activities/Dependent transfer   Education   Education Provided Mobility training;Home exercise program   Patient Reinforcement needed   End of Consult   Patient Position at End of Consult Supine;Bed/Chair alarm activated;All needs within reach     Bettye Bullock, PTA

## 2024-11-21 NOTE — PROGRESS NOTES
Progress Note - Hospitalist   Name: Mattie Varela 79 y.o. female I MRN: [id number]  Unit/Bed#: E4 -01 I Date of Admission: 11/17/2024   Date of Service: 11/21/2024 I Hospital Day: 4    Assessment & Plan  Acute on chronic diastolic congestive heart failure (HCC)  Wt Readings from Last 3 Encounters:   11/21/24 80 kg (176 lb 5.9 oz)   10/18/24 74.3 kg (163 lb 12.8 oz)   10/02/24 85.5 kg (188 lb 9.6 oz)   79 year old female presented with shortness of breath, lower extremity edema, volume overload, and weight gain secondary to acute on chronic diastolic congestive heart failure.  Takes torsemide 30 mg daily  Receiving IV lasix 40 mg bid --> increased to 60 mg BID  Continue metoprolol 50 mg twice daily  Down about 5.6 L, admission weight 194 pounds, current weight 176 pounds  Cardiology price checking Jardiance-$25 per day-unable to afford  Volume status much improved and cardiology would like to transition to oral diuretics today.  However in the setting of rising creatinine and need for CT with contrast, will hold oral diuretic at this time  Loose stools  Had large bowel movement 11/18 and has had frequent bouts of stool after this  C. difficile checked and was negative  Enteric pathogen panel pending  COVID, flu, RSV negative  With abdominal pain on exam today and poor oral intake  Obtain stat CT abdomen  Chest pain  Complains of ongoing pain with inspiration  Obtained EKG yesterday without signs of acute ischemia  Check stat troponin now-although lower suspicion for acute ACS at this time   Chest x-ray from yesterday with out pneumonia, effusion, pneumothorax  Patient with risk factors for PE of immobility, recent fracture  Noted to have some tachycardia yesterday and now requiring 1 L oxygen which is new  We will obtain stat CTA chest to rule out acute PE  Patella fracture  [facility]  [facility] left knee showing what appears to be a fracture of her bipartite patella   Seen by orthopedic team  Knee  immobilizer x 2 weeks with WBAT to LLE   Office follow-up in 2 weeks with repeat x-rays   PT OT recommending moderate resource intensity-level 2-discussed with daughter  Type 2 diabetes mellitus with other skin complications (HCC)  Lab Results   Component Value Date    HGBA1C 7.4 (H) 10/12/2024     Recent Labs     11/20/24  1602 11/20/24  2110 11/21/24  0758 11/21/24  1219   POCGLU 187* 176* 127 162*   Holding Metformin  Continue insulin sliding scale  Urinary retention  Had overactive bladder in the past and received Botox.   Continues to have urinary retention requiring ahn catheter placement. For outpatient voiding trial.  Void trial should occur with urology in the outpt setting  UA >100,000  cfu GNR. Was recently treated for Klebsiella cystitis  Without symptoms, fever or leukocytosis & chronic ahn, monitor off abx  Chronic pain disorder  History of chronic pain with degenerative spine and history of spinal stimulator in place  On as needed Percocet at home  C/w oxycodone 5mg prn for moderate pain & oxycodone 10mg for severe pain with acute fx  Stage 3a chronic kidney disease (HCC)  Lab Results   Component Value Date    EGFR 32 11/21/2024    EGFR 36 11/20/2024    EGFR 41 11/19/2024    CREATININE 1.48 (H) 11/21/2024    CREATININE 1.36 (H) 11/20/2024    CREATININE 1.23 11/19/2024   Baseline around 1.3   Monitor closely with recent diuresis and now need for IV contrast  Hypertension  Home regimen metoprolol succinate 50 mg twice daily  /59  Hypothyroidism  Continue levothyroxine  Insomnia  Was placed on melatonin however reports this is ineffective  Requesting additional sleep aid  Has trialed Ambien in the past and this worked well for her    VTE Pharmacologic Prophylaxis:   lovenox    Mobility:   Basic Mobility Inpatient Raw Score: 8  -[facility] Goal: 3: Sit at edge of bed  -[facility] Achieved: 4: Move to chair/commode    Patient Centered Rounds: yes   Discussions with Specialists or Other Care Team Provider:  nursing, Dr. Torres, case management, radiology in regards to contrast allergy prep protocol    Education and Discussions with Family / Patient: Patient, daughter via telephone-updated    Current Length of Stay: 4 day(s)  Current Patient Status: Inpatient   Certification Statement: With need for continued inpatient stay for ongoing chest pain and abdominal pain workup  Discharge Plan: 48 hours to 72 hours    Code Status: Level 1 - Full Code    Subjective   Resting in bed.  She reports ongoing persistent pain especially with inspiration.  Nursing reports she did not eat well yesterday.  Additionally complaining of abdominal pain with ongoing loose stools and tenderness to palpation on exam.  Discussed current condition with daughter and need for ongoing workup.  Patient will undergo CT imaging and allergy prep prior for reported allergy of hives to contrast.    Objective :  Temp:  [96.3 °F (35.7 °C)-99.8 °F (37.7 °C)] 99.8 °F (37.7 °C)  HR:  [] 77  BP: (113-140)/(57-91) 121/59  Resp:  [16-20] 18  SpO2:  [92 %-97 %] 96 %  O2 Device: Nasal cannula  Nasal Cannula O2 Flow Rate (L/min):  [1 L/min] 1 L/min    Body mass index is 34.44 kg/m².     Input and Output Summary (last 24 hours):     Intake/Output Summary (Last 24 hours) at 11/21/2024 1236  Last data filed at 11/20/2024 1920  Gross per 24 hour   Intake 620 ml   Output 1150 ml   Net -530 ml       Physical Exam  Vitals and nursing note reviewed.   Constitutional:       Appearance: She is obese. She is diaphoretic. She is not toxic-appearing.      Comments: Drowsy appearing today. Interactive, makes eye contact, and answers questions   HENT:      Head: Normocephalic and atraumatic.   Eyes:      General: No scleral icterus.  Cardiovascular:      Rate and Rhythm: Normal rate and regular rhythm.   Pulmonary:      Effort: Pulmonary effort is normal.      Breath sounds: Normal breath sounds.   Abdominal:      General: Bowel sounds are normal.      Tenderness: There is  abdominal tenderness.      Hernia: No hernia is present.   Musculoskeletal:         General: No swelling.      Cervical back: Neck supple.   Skin:     General: Skin is warm.   Neurological:      Mental Status: Mental status is at baseline.      Motor: No weakness.   Psychiatric:         Mood and Affect: Mood normal.         Lines/Drains:  Lines/Drains/Airways       Active Status       Name Placement date Placement time Site Days    Urethral Catheter 16 Fr. 10/26/24  1311  --  26                  Urinary Catheter:  Goal for removal: N/A - Chronic [place]                 Lab Results: I have reviewed the following results:   Results from last 7 days   Lab Units 11/21/24  0929 11/19/24  0551   WBC Thousand/uL 9.95 9.02   HEMOGLOBIN g/dL 11.4* 10.7*   HEMATOCRIT % 36.3 33.6*   PLATELETS Thousands/uL 256 253   SEGS PCT %  --  65   LYMPHO PCT %  --  20   MONO PCT %  --  12   EOS PCT %  --  3     Results from last 7 days   Lab Units 11/21/24  0929   SODIUM mmol/L 134*   POTASSIUM mmol/L 3.7   CHLORIDE mmol/L 95*   CO2 mmol/L 29   BUN mg/dL 24   CREATININE mg/dL 1.48*   ANION GAP mmol/L 10   CALCIUM mg/dL 9.4   ALBUMIN g/dL 3.2*   TOTAL BILIRUBIN mg/dL 0.59   ALK PHOS U/L 81   ALT U/L 5*   AST U/L 25   GLUCOSE RANDOM mg/dL 152*         Results from last 7 days   Lab Units 11/21/24  1219 11/21/24  0758 11/20/24  2110 11/20/24  1602 11/20/24  1222 11/20/24  0807 11/19/24  2126 11/19/24  1618 11/19/24  1109 11/19/24  0740 11/18/24  2100 11/18/24  1631   POC GLUCOSE mg/dl 162* 127 176* 187* 204* 171* 152* 119 183* 149* 158* 236*               Recent Cultures (last 7 days):   Results from last 7 days   Lab Units 11/19/24  0011 11/17/24  1002   URINE CULTURE   --  >100,000 cfu/ml   C DIFF TOXIN B BY PCR  Negative  --              Last 24 Hours Medication List:     Current Facility-Administered Medications:     acetaminophen (TYLENOL) tablet 650 mg, Q6H PRN    albuterol (PROVENTIL HFA,VENTOLIN HFA) inhaler 2 puff, Q6H PRN     aluminum-magnesium hydroxide-simethicone (MAALOX) oral suspension 30 mL, Q4H PRN    aspirin chewable tablet 81 mg, Daily    atorvastatin (LIPITOR) tablet 40 mg, Daily With Dinner    baclofen tablet 10 mg, BID    enoxaparin (LOVENOX) subcutaneous injection 40 mg, Daily    ferrous sulfate tablet 325 mg, Daily With Breakfast    gabapentin (NEURONTIN) capsule 100 mg, HS    guaiFENesin (MUCINEX) 12 hr tablet 600 mg, Q12H ARY    insulin lispro (HumALOG/ADMELOG) 100 units/mL subcutaneous injection 1-6 Units, TID AC **AND** Fingerstick Glucose (POCT), TID AC    latanoprost (XALATAN) 0.005 % ophthalmic solution 1 drop, HS    levalbuterol (XOPENEX) inhalation solution 1.25 mg, TID    levothyroxine tablet 37.5 mcg, Early Morning    lidocaine (LIDODERM) 5 % patch 1 patch, Daily    loperamide (IMODIUM) capsule 2 mg, 4x Daily PRN    meclizine (ANTIVERT) tablet 12.5 mg, Daily PRN    melatonin tablet 3 mg, HS PRN    metoprolol succinate (TOPROL-XL) 24 hr tablet 50 mg, Q12H ARY    Milnacipran HCl TABS 100 mg, Daily    oxyCODONE (ROXICODONE) immediate release tablet 10 mg, Q6H PRN    oxyCODONE (ROXICODONE) IR tablet 5 mg, Q6H PRN    pantoprazole (PROTONIX) EC tablet 40 mg, Early Morning    sodium chloride 0.9 % infusion, Continuous, Last Rate: 50 mL/hr (11/21/24 1147)    sucralfate (CARAFATE) tablet 1 g, BID AC    [Held by provider] torsemide (DEMADEX) tablet 30 mg, BID    zolpidem (AMBIEN) tablet 5 mg, HS PRN    Administrative Statements   Today, Patient Was Seen By: Susannah Mcclain PA-C      **Please Note: This note may have been constructed using a voice recognition system.**

## 2024-11-21 NOTE — ASSESSMENT & PLAN NOTE
History of chronic pain with degenerative spine and history of spinal stimulator in place  On as needed Percocet at home  C/w oxycodone 5mg prn for moderate pain & oxycodone 10mg for severe pain with acute fx

## 2024-11-21 NOTE — ASSESSMENT & PLAN NOTE
Had large bowel movement 11/18 and has had frequent bouts of stool after this  C. difficile checked and was negative  Enteric pathogen panel pending  COVID, flu, RSV negative  With abdominal pain on exam today and poor oral intake  Obtain stat CT abdomen

## 2024-11-21 NOTE — ASSESSMENT & PLAN NOTE
S/p recent fall at [facility]  CT left knee showing what appears to be a fracture of her bipartite patella   Seen by orthopedic team  Knee immobilizer x 2 weeks with WBAT to LLE   Office follow-up in 2 weeks with repeat x-rays   PT OT recommending moderate resource intensity-level 2-discussed with daughter

## 2024-11-21 NOTE — ASSESSMENT & PLAN NOTE
- A1c 10/12/24: 7.4  - Home meds metformin 500 mg PO BID     Plan:  Insulin lispro and SSI  Hold while inpatient   Consider switching to another antidiabetic meds considering her low eGFR (32) and high creatinine.

## 2024-11-21 NOTE — ASSESSMENT & PLAN NOTE
Wt Readings from Last 3 Encounters:   11/21/24 80 kg (176 lb 5.9 oz)   10/18/24 74.3 kg (163 lb 12.8 oz)   10/02/24 85.5 kg (188 lb 9.6 oz)   79 year old female presented with shortness of breath, lower extremity edema, volume overload, and weight gain secondary to acute on chronic diastolic congestive heart failure.  Takes torsemide 30 mg daily  Receiving IV lasix 40 mg bid --> increased to 60 mg BID  Continue metoprolol 50 mg twice daily  Down about 5.6 L, admission weight 194 pounds, current weight 176 pounds  Cardiology price checking Jardiance-$25 per day-unable to afford  Volume status much improved and cardiology would like to transition to oral diuretics today.  However in the setting of rising creatinine and need for CT with contrast, will hold oral diuretic at this time

## 2024-11-21 NOTE — PLAN OF CARE
Problem: Potential for Falls  Goal: Patient will remain free of falls  Description: INTERVENTIONS:  - Educate patient/family on patient safety including physical limitations  - Instruct patient to call for assistance with activity   - Consult OT/PT to assist with strengthening/mobility   - Keep Call bell within reach  - Keep bed low and locked with side rails adjusted as appropriate  - Keep care items and personal belongings within reach  - Initiate and maintain comfort rounds  - Make Fall Risk Sign visible to staff  - Offer Toileting every 2 Hours, in advance of need  - Initiate/Maintain bed alarm  - Obtain necessary fall risk management equipment: alarm  - Apply yellow socks and bracelet for high fall risk patients  - Consider moving patient to room near nurses station  Outcome: Progressing   Problem: PAIN - ADULT  Goal: Verbalizes/displays adequate comfort level or baseline comfort level  Description: Interventions:  - Encourage patient to monitor pain and request assistance  - Assess pain using appropriate pain scale  - Administer analgesics based on type and severity of pain and evaluate response  - Implement non-pharmacological measures as appropriate and evaluate response  - Consider cultural and social influences on pain and pain management  - Notify physician/advanced practitioner if interventions unsuccessful or patient reports new pain  Outcome: Progressing     Problem: SAFETY ADULT  Goal: Patient will remain free of falls  Description: INTERVENTIONS:  - Educate patient/family on patient safety including physical limitations  - Instruct patient to call for assistance with activity   - Consult OT/PT to assist with strengthening/mobility   - Keep Call bell within reach  - Keep bed low and locked with side rails adjusted as appropriate  - Keep care items and personal belongings within reach  - Initiate and maintain comfort rounds  - Make Fall Risk Sign visible to staff  - Offer Toileting every 2 Hours, in  advance of need  - Initiate/Maintain bed alarm  - Obtain necessary fall risk management equipment: alarm  - Apply yellow socks and bracelet for high fall risk patients  - Consider moving patient to room near nurses station  Outcome: Progressing  Goal: Maintain or return to baseline ADL function  Description: INTERVENTIONS:  -  Assess patient's ability to carry out ADLs; assess patient's baseline for ADL function and identify physical deficits which impact ability to perform ADLs (bathing, care of mouth/teeth, toileting, grooming, dressing, etc.)  - Assess/evaluate cause of self-care deficits   - Assess range of motion  - Assess patient's mobility; develop plan if impaired  - Assess patient's need for assistive devices and provide as appropriate  - Encourage maximum independence but intervene and supervise when necessary  - Involve family in performance of ADLs  - Assess for home care needs following discharge   - Consider OT consult to assist with ADL evaluation and planning for discharge  - Provide patient education as appropriate  Outcome: Progressing  Goal: Maintains/Returns to pre admission functional level  Description: INTERVENTIONS:  - Perform AM-PAC 6 Click Basic Mobility/ Daily Activity assessment daily.  - Set and communicate daily mobility goal to care team and patient/family/caregiver.   - Collaborate with rehabilitation services on mobility goals if consulted  - Perform Range of Motion 3 times a day.  - Reposition patient every 2 hours.  - Dangle patient 3 times a day  - Stand patient 3 times a day  - Ambulate patient 3 times a day  - Out of bed to chair 3 times a day   - Out of bed for meals 3 times a day  - Out of bed for toileting  - Record patient progress and toleration of activity level   Outcome: Progressing     Problem: DISCHARGE PLANNING  Goal: Discharge to home or other facility with appropriate resources  Description: INTERVENTIONS:  - Identify barriers to discharge w/patient and caregiver  -  Arrange for needed discharge resources and transportation as appropriate  - Identify discharge learning needs (meds, wound care, etc.)  - Arrange for interpretive services to assist at discharge as needed  - Refer to Case Management Department for coordinating discharge planning if the patient needs post-hospital services based on physician/advanced practitioner order or complex needs related to functional status, cognitive ability, or social support system  Outcome: Progressing     Problem: Knowledge Deficit  Goal: Patient/family/caregiver demonstrates understanding of disease process, treatment plan, medications, and discharge instructions  Description: Complete learning assessment and assess knowledge base.  Interventions:  - Provide teaching at level of understanding  - Provide teaching via preferred learning methods  Outcome: Progressing     Problem: CARDIOVASCULAR - ADULT  Goal: Maintains optimal cardiac output and hemodynamic stability  Description: INTERVENTIONS:  - Monitor I/O, vital signs and rhythm  - Monitor for S/S and trends of decreased cardiac output  - Administer and titrate ordered vasoactive medications to optimize hemodynamic stability  - Assess quality of pulses, skin color and temperature  - Assess for signs of decreased coronary artery perfusion  - Instruct patient to report change in severity of symptoms  Outcome: Progressing  Goal: Absence of cardiac dysrhythmias or at baseline rhythm  Description: INTERVENTIONS:  - Continuous cardiac monitoring, vital signs, obtain 12 lead EKG if ordered  - Administer antiarrhythmic and heart rate control medications as ordered  - Monitor electrolytes and administer replacement therapy as ordered  Outcome: Progressing     Problem: RESPIRATORY - ADULT  Goal: Achieves optimal ventilation and oxygenation  Description: INTERVENTIONS:  - Assess for changes in respiratory status  - Assess for changes in mentation and behavior  - Position to facilitate oxygenation  and minimize respiratory effort  - Oxygen administered by appropriate delivery if ordered  - Initiate smoking cessation education as indicated  - Encourage broncho-pulmonary hygiene including cough, deep breathe, Incentive Spirometry  - Assess the need for suctioning and aspirate as needed  - Assess and instruct to report SOB or any respiratory difficulty  - Respiratory Therapy support as indicated  Outcome: Progressing     Problem: Prexisting or High Potential for Compromised Skin Integrity  Goal: Skin integrity is maintained or improved  Description: INTERVENTIONS:  - Identify patients at risk for skin breakdown  - Assess and monitor skin integrity  - Assess and monitor nutrition and hydration status  - Monitor labs   - Assess for incontinence   - Turn and reposition patient  - Assist with mobility/ambulation  - Relieve pressure over bony prominences  - Avoid friction and shearing  - Provide appropriate hygiene as needed including keeping skin clean and dry  - Evaluate need for skin moisturizer/barrier cream  - Collaborate with interdisciplinary team   - Patient/family teaching  - Consider wound care consult   Outcome: Progressing

## 2024-11-21 NOTE — ASSESSMENT & PLAN NOTE
- TTE 05/23/24: LVEF 70%, grade I diastolic dysfunction, mild LVOT obstruction, moderate annular calcification, mild TR   - BNP 11/17/24: 264 (BNP 05/22/24 was 302)  - CXR 11/17/24: no acute cardiopulmonary disease    -Home meds: torsemide 30 mg PO BID  - inpatient diuretic: She was on IV lasix 60 mg BID

## 2024-11-22 ENCOUNTER — APPOINTMENT (INPATIENT)
Dept: NON INVASIVE DIAGNOSTICS | Facility: HOSPITAL | Age: 79
DRG: 562 | End: 2024-11-22
Payer: MEDICARE

## 2024-11-22 PROBLEM — E87.6 HYPOKALEMIA: Status: ACTIVE | Noted: 2024-11-22

## 2024-11-22 PROBLEM — K52.9 COLITIS: Status: ACTIVE | Noted: 2024-11-19

## 2024-11-22 LAB
ALBUMIN SERPL BCG-MCNC: 3 G/DL (ref 3.5–5)
ALP SERPL-CCNC: 70 U/L (ref 34–104)
ALT SERPL W P-5'-P-CCNC: 6 U/L (ref 7–52)
ANION GAP SERPL CALCULATED.3IONS-SCNC: 11 MMOL/L (ref 4–13)
APICAL FOUR CHAMBER EJECTION FRACTION: 51 %
AST SERPL W P-5'-P-CCNC: 15 U/L (ref 13–39)
BILIRUB SERPL-MCNC: 0.33 MG/DL (ref 0.2–1)
BSA FOR ECHO PROCEDURE: 1.77 M2
BUN SERPL-MCNC: 31 MG/DL (ref 5–25)
CALCIUM ALBUM COR SERPL-MCNC: 9.9 MG/DL (ref 8.3–10.1)
CALCIUM SERPL-MCNC: 9.1 MG/DL (ref 8.4–10.2)
CHLORIDE SERPL-SCNC: 94 MMOL/L (ref 96–108)
CO2 SERPL-SCNC: 29 MMOL/L (ref 21–32)
CREAT SERPL-MCNC: 1.78 MG/DL (ref 0.6–1.3)
ERYTHROCYTE [DISTWIDTH] IN BLOOD BY AUTOMATED COUNT: 14 % (ref 11.6–15.1)
GFR SERPL CREATININE-BSD FRML MDRD: 26 ML/MIN/1.73SQ M
GLUCOSE SERPL-MCNC: 112 MG/DL (ref 65–140)
GLUCOSE SERPL-MCNC: 152 MG/DL (ref 65–140)
GLUCOSE SERPL-MCNC: 222 MG/DL (ref 65–140)
GLUCOSE SERPL-MCNC: 260 MG/DL (ref 65–140)
GLUCOSE SERPL-MCNC: 267 MG/DL (ref 65–140)
GLUCOSE SERPL-MCNC: 290 MG/DL (ref 65–140)
GLUCOSE SERPL-MCNC: 99 MG/DL (ref 65–140)
HCT VFR BLD AUTO: 31.3 % (ref 34.8–46.1)
HGB BLD-MCNC: 10.2 G/DL (ref 11.5–15.4)
MCH RBC QN AUTO: 28.9 PG (ref 26.8–34.3)
MCHC RBC AUTO-ENTMCNC: 32.6 G/DL (ref 31.4–37.4)
MCV RBC AUTO: 89 FL (ref 82–98)
PLATELET # BLD AUTO: 275 THOUSANDS/UL (ref 149–390)
PMV BLD AUTO: 9.9 FL (ref 8.9–12.7)
POTASSIUM SERPL-SCNC: 3 MMOL/L (ref 3.5–5.3)
PROT SERPL-MCNC: 6 G/DL (ref 6.4–8.4)
RBC # BLD AUTO: 3.53 MILLION/UL (ref 3.81–5.12)
SL CV LV EF: 51
SODIUM SERPL-SCNC: 134 MMOL/L (ref 135–147)
WBC # BLD AUTO: 9.62 THOUSAND/UL (ref 4.31–10.16)

## 2024-11-22 PROCEDURE — 99232 SBSQ HOSP IP/OBS MODERATE 35: CPT | Performed by: INTERNAL MEDICINE

## 2024-11-22 PROCEDURE — 82948 REAGENT STRIP/BLOOD GLUCOSE: CPT

## 2024-11-22 PROCEDURE — 80053 COMPREHEN METABOLIC PANEL: CPT | Performed by: FAMILY MEDICINE

## 2024-11-22 PROCEDURE — 94640 AIRWAY INHALATION TREATMENT: CPT

## 2024-11-22 PROCEDURE — 93308 TTE F-UP OR LMTD: CPT | Performed by: INTERNAL MEDICINE

## 2024-11-22 PROCEDURE — 85027 COMPLETE CBC AUTOMATED: CPT | Performed by: FAMILY MEDICINE

## 2024-11-22 PROCEDURE — 93308 TTE F-UP OR LMTD: CPT

## 2024-11-22 PROCEDURE — 94760 N-INVAS EAR/PLS OXIMETRY 1: CPT

## 2024-11-22 PROCEDURE — 99233 SBSQ HOSP IP/OBS HIGH 50: CPT | Performed by: PHYSICIAN ASSISTANT

## 2024-11-22 RX ORDER — POTASSIUM CHLORIDE 1500 MG/1
40 TABLET, EXTENDED RELEASE ORAL ONCE
Status: COMPLETED | OUTPATIENT
Start: 2024-11-22 | End: 2024-11-22

## 2024-11-22 RX ORDER — HEPARIN SODIUM 5000 [USP'U]/ML
5000 INJECTION, SOLUTION INTRAVENOUS; SUBCUTANEOUS EVERY 8 HOURS SCHEDULED
Status: DISCONTINUED | OUTPATIENT
Start: 2024-11-22 | End: 2024-12-02 | Stop reason: HOSPADM

## 2024-11-22 RX ORDER — HYDROMORPHONE HCL IN WATER/PF 6 MG/30 ML
0.2 PATIENT CONTROLLED ANALGESIA SYRINGE INTRAVENOUS EVERY 6 HOURS PRN
Status: DISCONTINUED | OUTPATIENT
Start: 2024-11-22 | End: 2024-11-25

## 2024-11-22 RX ADMIN — METRONIDAZOLE 500 MG: 5 INJECTION, SOLUTION INTRAVENOUS at 04:51

## 2024-11-22 RX ADMIN — INSULIN LISPRO 3 UNITS: 100 INJECTION, SOLUTION INTRAVENOUS; SUBCUTANEOUS at 11:52

## 2024-11-22 RX ADMIN — GUAIFENESIN 600 MG: 600 TABLET, EXTENDED RELEASE ORAL at 21:20

## 2024-11-22 RX ADMIN — HYDROMORPHONE HYDROCHLORIDE 0.2 MG: 0.2 INJECTION, SOLUTION INTRAMUSCULAR; INTRAVENOUS; SUBCUTANEOUS at 14:39

## 2024-11-22 RX ADMIN — PANTOPRAZOLE SODIUM 40 MG: 40 TABLET, DELAYED RELEASE ORAL at 06:36

## 2024-11-22 RX ADMIN — GUAIFENESIN 600 MG: 600 TABLET, EXTENDED RELEASE ORAL at 08:40

## 2024-11-22 RX ADMIN — POTASSIUM CHLORIDE 40 MEQ: 1500 TABLET, EXTENDED RELEASE ORAL at 08:40

## 2024-11-22 RX ADMIN — ACETAMINOPHEN 650 MG: 325 TABLET, FILM COATED ORAL at 10:53

## 2024-11-22 RX ADMIN — ASPIRIN 81 MG CHEWABLE TABLET 81 MG: 81 TABLET CHEWABLE at 08:40

## 2024-11-22 RX ADMIN — LEVALBUTEROL HYDROCHLORIDE 1.25 MG: 1.25 SOLUTION RESPIRATORY (INHALATION) at 13:35

## 2024-11-22 RX ADMIN — LEVOTHYROXINE SODIUM 37.5 MCG: 75 TABLET ORAL at 06:36

## 2024-11-22 RX ADMIN — INSULIN LISPRO 3 UNITS: 100 INJECTION, SOLUTION INTRAVENOUS; SUBCUTANEOUS at 22:03

## 2024-11-22 RX ADMIN — OXYCODONE HYDROCHLORIDE 5 MG: 10 TABLET ORAL at 06:40

## 2024-11-22 RX ADMIN — HEPARIN SODIUM 5000 UNITS: 5000 INJECTION INTRAVENOUS; SUBCUTANEOUS at 14:37

## 2024-11-22 RX ADMIN — ATORVASTATIN CALCIUM 40 MG: 40 TABLET, FILM COATED ORAL at 17:02

## 2024-11-22 RX ADMIN — SUCRALFATE 1 G: 1 TABLET ORAL at 06:36

## 2024-11-22 RX ADMIN — BACLOFEN 10 MG: 10 TABLET ORAL at 21:20

## 2024-11-22 RX ADMIN — LEVALBUTEROL HYDROCHLORIDE 1.25 MG: 1.25 SOLUTION RESPIRATORY (INHALATION) at 19:52

## 2024-11-22 RX ADMIN — INSULIN LISPRO 1 UNITS: 100 INJECTION, SOLUTION INTRAVENOUS; SUBCUTANEOUS at 01:28

## 2024-11-22 RX ADMIN — BACLOFEN 10 MG: 10 TABLET ORAL at 08:40

## 2024-11-22 RX ADMIN — GABAPENTIN 100 MG: 100 CAPSULE ORAL at 21:20

## 2024-11-22 RX ADMIN — MELATONIN 3 MG: 3 TAB ORAL at 21:39

## 2024-11-22 RX ADMIN — INSULIN LISPRO 4 UNITS: 100 INJECTION, SOLUTION INTRAVENOUS; SUBCUTANEOUS at 17:34

## 2024-11-22 RX ADMIN — METRONIDAZOLE 500 MG: 5 INJECTION, SOLUTION INTRAVENOUS at 21:13

## 2024-11-22 RX ADMIN — OXYCODONE HYDROCHLORIDE 10 MG: 10 TABLET ORAL at 17:02

## 2024-11-22 RX ADMIN — FERROUS SULFATE TAB 325 MG (65 MG ELEMENTAL FE) 325 MG: 325 (65 FE) TAB at 08:30

## 2024-11-22 RX ADMIN — METRONIDAZOLE 500 MG: 5 INJECTION, SOLUTION INTRAVENOUS at 11:00

## 2024-11-22 RX ADMIN — SUCRALFATE 1 G: 1 TABLET ORAL at 17:02

## 2024-11-22 RX ADMIN — LIDOCAINE 1 PATCH: 50 PATCH TOPICAL at 08:40

## 2024-11-22 RX ADMIN — LOPERAMIDE HYDROCHLORIDE 2 MG: 2 CAPSULE ORAL at 06:36

## 2024-11-22 RX ADMIN — LEVALBUTEROL HYDROCHLORIDE 1.25 MG: 1.25 SOLUTION RESPIRATORY (INHALATION) at 07:27

## 2024-11-22 RX ADMIN — LATANOPROST 1 DROP: 50 SOLUTION OPHTHALMIC at 21:25

## 2024-11-22 RX ADMIN — HEPARIN SODIUM 5000 UNITS: 5000 INJECTION INTRAVENOUS; SUBCUTANEOUS at 21:21

## 2024-11-22 RX ADMIN — HEPARIN SODIUM 5000 UNITS: 5000 INJECTION INTRAVENOUS; SUBCUTANEOUS at 08:40

## 2024-11-22 RX ADMIN — CIPROFLOXACIN 400 MG: 2 INJECTION, SOLUTION INTRAVENOUS at 18:35

## 2024-11-22 NOTE — OCCUPATIONAL THERAPY NOTE
Occupational Therapy         Patient Name: Mattie Varela  Today's Date: 11/22/2024 11/22/24 1421   OT Last Visit   OT Visit Date 11/22/24   Note Type   Note type Cancelled Session   Cancel Reasons Refusal     Teresa Gutierrez

## 2024-11-22 NOTE — WOUND OSTOMY CARE
Consult Note - Wound   Mattie A Nichole 79 y.o. female MRN: [id number]  Unit/Bed#: E4 -01 Encounter: [id number]      History and Present Illness:  Admitted 11/17/24. Acute on chronic CHF, hypokalemia, Type  2 Dm. history of recent left patella fracture with an immobilizer in place on left knee.Presented to ED with a 31 lb weight gain in one month. States she has a [facility] nurse visit her at her home and assess her LE wounds.  Assessment Findings:   Supine in bed, agreeable to assessment.  1)Chronic wounds to B/L L/E's.  Left medial tibia wound dry intact pink  2)Right anterior distal tibia pink intact.no active drainage on tibial wounds when dressings removed.  3)Left buttocks pink intact dry scarring noted bilateral buttocks brown hyperpigmentation. Calazime applied experiencing frequent loose stools.  4)Left LE immobilizer , skin beneath and posterior left tibia assessed and no areas of pressure noted. Mepilex foam applied to left distal posterior tibia to offload pressure applied at rest.    Skin care plans:  1-Apply thin layer of Calazime/Protective Zinc Oxide paste to sacrum and buttocks TID and PRN after incont episodes  2-Hydraguard/Silicone Cream to bilateral heels BID and PRN.  3-Elevate heels to offload pressure.  4-Ehob cushion when out of bed.  5-Turn/reposition q2h or when medically stable for pressure re-distribution on skin.  6-Moisturize skin daily with skin nourishing cream.  7-Mepilex foam applied to posterior distal end of left tibia to pad knee brace.  Change Q 3 days and prn       Wound 10/11/24 Pretibial Left (Active)   Wound Image   11/22/24 1011   Wound Description Dry;Fragile;Pink 11/22/24 1011   Jovana-wound Assessment Intact 11/22/24 1011   Wound Length (cm) 1.2 cm 11/22/24 1011   Wound Width (cm) 1 cm 11/22/24 1011   Wound Depth (cm) 0.1 cm 11/22/24 1011   Wound Surface Area (cm^2) 1.2 cm^2 11/22/24 1011   Wound Volume (cm^3) 0.12 cm^3 11/22/24 1011   Calculated Wound Volume (cm^3)  0.12 cm^3 11/22/24 1011   Drainage Amount None 11/22/24 1011   Non-staged Wound Description Partial thickness 11/22/24 1011   Treatments Cleansed;Site care 11/22/24 1011   Dressing Dermagran gauze;Non adherent 11/22/24 1011   Dressing Changed Changed 11/22/24 1011   Patient Tolerance Tolerated well 11/22/24 1011   Dressing Status Clean;Dry;Intact 11/22/24 1011       Wound 10/11/24 Pretibial Right (Active)   Wound Image   11/22/24 1012   Wound Description Dry;Intact;Fragile;Pink 11/22/24 1012   Jovana-wound Assessment Dry;Intact 11/22/24 1012   Wound Length (cm) 3.5 cm 11/22/24 1012   Wound Width (cm) 3.7 cm 11/22/24 1012   Wound Depth (cm) 1 cm 11/22/24 1012   Wound Surface Area (cm^2) 12.95 cm^2 11/22/24 1012   Wound Volume (cm^3) 12.95 cm^3 11/22/24 1012   Calculated Wound Volume (cm^3) 12.95 cm^3 11/22/24 1012   Drainage Amount None 11/22/24 1012   Non-staged Wound Description Partial thickness 11/22/24 1012   Treatments Cleansed;Site care 11/22/24 1012   Dressing Non adherent;Dry dressing 11/22/24 1012   Dressing Changed Changed 11/22/24 1012   Patient Tolerance Tolerated well 11/22/24 1012   Dressing Status Clean;Dry;Intact 11/22/24 1012   Wound 11/22/24 Sacrum (Active)   Wound Image   11/22/24 1026   Wound Description Brown;Intact;Dry 11/22/24 1026   Jovana-wound Assessment Intact;Dry;Hyperpigmented 11/22/24 1026   Wound Length (cm) 8 cm 11/22/24 1026   Wound Width (cm) 12 cm 11/22/24 1026   Wound Surface Area (cm^2) 96 cm^2 11/22/24 1026   Drainage Amount None 11/22/24 1026   Treatments Site care 11/22/24 1026   Dressing Protective barrier 11/22/24 1026   Dressing Changed New 11/22/24 1026   Patient Tolerance Tolerated well 11/22/24 1026   Dressing Status Remoistened 11/22/24 1026         Call or Secure Chat  with any questions  Wound Care will continue to follow weekly    Bettye MCKINLEYN RN [facility]

## 2024-11-22 NOTE — PROGRESS NOTES
Progress Note - Hospitalist   Name: Mattie Varela 79 y.o. female I MRN: [id number]  Unit/Bed#: E4 -01 I Date of Admission: 11/17/2024   Date of Service: 11/22/2024 I Hospital Day: 5    Assessment & Plan  Acute on chronic diastolic congestive heart failure (HCC)  Wt Readings from Last 3 Encounters:   11/22/24 79.8 kg (176 lb)   10/18/24 74.3 kg (163 lb 12.8 oz)   10/02/24 85.5 kg (188 lb 9.6 oz)   79 year old female presented with shortness of breath, lower extremity edema, volume overload, and weight gain secondary to acute on chronic diastolic congestive heart failure.  Echo was updated-EF 51%, systolic function low normal, septic akinetic to paradoxical, endocardial detection was significantly limited.  Unable to assess diastolic function  Takes torsemide 30 mg daily  Receiving IV lasix 40 mg bid --> increased to 60 mg BID  Continue metoprolol 50 mg twice daily  Down about 6.4 L, admission weight 194 pounds, current weight 176 pounds  Cardiology price checking Jardiance-$25 per day-unable to afford  Volume status much improved and cardiology would like to transition to oral diuretics however now holding diuretics given rise in Cr  Continue to monitor  Colitis  Had large bowel movement 11/18 and has had frequent bouts of stool after this  C. difficile and enteric pathogen panel negative  COVID, flu, RSV negative  With abdominal pain and poor oral intake  CT abdomen reveals diffuse colonic wall thickening and mucosal hyperenhancement compatible with colitis most prominently involving the descending and sigmoid colon.  Colonic fluid compatible with scattered colonic diverticuli.  Started on IV anbiotics with ceftriaxone and metronidazole  Plan for a 5-day course  Chest pain  Complained of chest pain with inspiration  Obtained EKG- without signs of acute ischemia, Trop was 22  Chest x-ray: without pneumonia, effusion, pneumothorax  Patient with risk factors for PE of immobility, recent fracture -PE ruled out  with CTA chest  Noted heavy atherosclerotic coronary artery calcification  Will discuss with cardiology for further recommendations / medical management   Check lipid panel  Patella fracture  [facility] fall [facility] home  CT left knee showing what appears to be a fracture of her bipartite patella   Seen by orthopedic team  Knee immobilizer x 2 weeks with WBAT to LLE   Office follow-up in 2 weeks with repeat x-rays   PT OT recommending moderate resource intensity-level 2-discussing with daughter  Stage 3a chronic kidney disease (HCC)  Lab Results   Component Value Date    EGFR 26 11/22/2024    EGFR 32 11/21/2024    EGFR 36 11/20/2024    CREATININE 1.78 (H) 11/22/2024    CREATININE 1.48 (H) 11/21/2024    CREATININE 1.36 (H) 11/20/2024   Baseline around 1.3   Noted SADAF on CKD now  Cr rise likely secondary to recent diuresis and recent dye load   Holding nephrotoxins -diuretics   Switched DVT prophylaxis from Lovenox to heparin  Hypokalemia  Potassium low at 3.0  Likely secondary to recent diuresis, diarrhea, and poor oral intake  Repleted with oral K  Recheck in a.m.  Check a.m. mag  Type 2 diabetes mellitus with other skin complications ([facility])  Lab Results   Component Value Date    HGBA1C 7.4 (H) 10/12/2024     Recent Labs     11/22/24  0106 11/22/24  0740 11/22/24  0957 11/22/24  1100   POCGLU 152* 99 222* 260*   Holding Metformin  Continue insulin sliding scale  Hold off on premeal insulin as oral intake has been poor  Urinary retention  Had overactive bladder in the past and received Botox.   Continues to have urinary retention requiring ahn catheter placement. For outpatient voiding trial.  Void trial should occur with urology in the outpt setting  UA >100,000  cfu GNR. Was recently treated for Klebsiella cystitis  Without symptoms, fever or leukocytosis & chronic ahn, monitor off abx  Chronic pain disorder  History of chronic pain with degenerative spine and history of spinal stimulator in place  On as needed  Percocet at home  C/w oxycodone 5 mg prn for moderate pain & oxycodone 10 mg for severe pain with acute fx  Added dilaudid for breakthrough pain  Hypertension  Home regimen metoprolol succinate 50 mg twice daily  BP stable  Hypothyroidism  Continue levothyroxine  Insomnia  Was placed on melatonin however reports this is ineffective  Requested additional sleep aid  Has trialed Ambien in the past and this worked well for her    VTE Pharmacologic Prophylaxis:   heparin    Mobility:   Basic Mobility Inpatient Raw Score: 8  -[facility] Goal: 3: Sit at edge of bed  -[facility] Achieved: 2: Bed activities/Dependent transfer      Patient Centered Rounds: I performed bedside rounds with nursing staff today.   Discussions with Specialists or Other Care Team Provider: nursing    Education and Discussions with Family / Patient: patient, called daughter via telephone- unable to reach     Current Length of Stay: 5 day(s)  Current Patient Status: Inpatient   Certification Statement: For continued inpatient stay for ongoing treatment of colitis and SADAF  Discharge Plan: >48 hrs     Code Status: Level 1 - Full Code    Subjective   Resting in bed.  Complains of ongoing pain. Mainly located in her chest, abdomen, and lumbar spine. She's concerned that the pain med of oxycodone is no longer helping her. Was able to eat a little more today than yesterday. Discussed finding of colitis. Diarrhea slowly improving.    Objective :  Temp:  [97.6 °F (36.4 °C)-98 °F (36.7 °C)] 97.7 °F (36.5 °C)  HR:  [] 104  BP: ()/(55-71) 115/59  Resp:  [18] 18  SpO2:  [96 %-99 %] 99 %  O2 Device: Nasal cannula  Nasal Cannula O2 Flow Rate (L/min):  [1 L/min-2 L/min] 2 L/min    Body mass index is 34.37 kg/m².     Input and Output Summary (last 24 hours):     Intake/Output Summary (Last 24 hours) at 11/22/2024 1403  Last data filed at 11/22/2024 1005  Gross per 24 hour   Intake 240 ml   Output 1350 ml   Net -1110 ml       Physical Exam  Vitals and nursing note  reviewed.   Constitutional:       General: She is not in acute distress.     Appearance: She is obese. She is not ill-appearing, toxic-appearing or diaphoretic.   HENT:      Head: Normocephalic and atraumatic.   Eyes:      General: No scleral icterus.  Cardiovascular:      Rate and Rhythm: Normal rate and regular rhythm.   Pulmonary:      Effort: Pulmonary effort is normal. No respiratory distress.      Breath sounds: Normal breath sounds. No stridor. No wheezing or rhonchi.   Abdominal:      General: Bowel sounds are normal. There is no distension.      Palpations: Abdomen is soft. There is no mass.      Tenderness: There is abdominal tenderness.      Hernia: No hernia is present.   Musculoskeletal:         General: No swelling.      Cervical back: Neck supple.   Skin:     General: Skin is warm.   Neurological:      Mental Status: She is alert and oriented to person, place, and time. Mental status is at baseline.   Psychiatric:         Mood and Affect: Mood normal.         Behavior: Behavior normal.         Lines/Drains:  Lines/Drains/Airways       Active Status       Name Placement date Placement time Site Days    Urethral Catheter 16 Fr. 10/26/24  1311  --  27                  Urinary Catheter:  Goal for removal: N/A - Chronic Bateman                 Lab Results: I have reviewed the following results:   Results from last 7 days   Lab Units 11/22/24  0351 11/21/24  0929 11/19/24  0551   WBC Thousand/uL 9.62   < > 9.02   HEMOGLOBIN g/dL 10.2*   < > 10.7*   HEMATOCRIT % 31.3*   < > 33.6*   PLATELETS Thousands/uL 275   < > 253   SEGS PCT %  --   --  65   LYMPHO PCT %  --   --  20   MONO PCT %  --   --  12   EOS PCT %  --   --  3    < > = values in this interval not displayed.     Results from last 7 days   Lab Units 11/22/24  0351   SODIUM mmol/L 134*   POTASSIUM mmol/L 3.0*   CHLORIDE mmol/L 94*   CO2 mmol/L 29   BUN mg/dL 31*   CREATININE mg/dL 1.78*   ANION GAP mmol/L 11   CALCIUM mg/dL 9.1   ALBUMIN g/dL 3.0*    TOTAL BILIRUBIN mg/dL 0.33   ALK PHOS U/L 70   ALT U/L 6*   AST U/L 15   GLUCOSE RANDOM mg/dL 112         Results from last 7 days   Lab Units 11/22/24  1100 11/22/24  0957 11/22/24  0740 11/22/24  0106 11/21/24  2052 11/21/24  1219 11/21/24  0758 11/20/24  2110 11/20/24  1602 11/20/24  1222 11/20/24  0807 11/19/24  2126   POC GLUCOSE mg/dl 260* 222* 99 152* 292* 162* 127 176* 187* 204* 171* 152*               Recent Cultures (last 7 days):   Results from last 7 days   Lab Units 11/19/24  0011 11/17/24  1002   URINE CULTURE   --  >100,000 cfu/ml   C DIFF TOXIN B BY PCR  Negative  --              Last 24 Hours Medication List:     Current Facility-Administered Medications:     acetaminophen (TYLENOL) tablet 650 mg, Q6H PRN    albuterol (PROVENTIL HFA,VENTOLIN HFA) inhaler 2 puff, Q6H PRN    aluminum-magnesium hydroxide-simethicone (MAALOX) oral suspension 30 mL, Q4H PRN    aspirin chewable tablet 81 mg, Daily    atorvastatin (LIPITOR) tablet 40 mg, Daily With Dinner    baclofen tablet 10 mg, BID    ciprofloxacin (CIPRO) IVPB (premix in 5% dextrose) 400 mg 200 mL, Q24H, Last Rate: 400 mg (11/1943)    ferrous sulfate tablet 325 mg, Daily With Breakfast    gabapentin (NEURONTIN) capsule 100 mg, HS    guaiFENesin (MUCINEX) 12 hr tablet 600 mg, Q12H ARY    heparin (porcine) subcutaneous injection 5,000 Units, Q8H ARY    HYDROmorphone HCl (DILAUDID) injection 0.2 mg, Q6H PRN    insulin lispro (HumALOG/ADMELOG) 100 units/mL subcutaneous injection 1-6 Units, 4x Daily (AC & HS) **AND** Fingerstick Glucose (POCT), 4x Daily AC and at bedtime    latanoprost (XALATAN) 0.005 % ophthalmic solution 1 drop, HS    levalbuterol (XOPENEX) inhalation solution 1.25 mg, TID    levothyroxine tablet 37.5 mcg, Early Morning    lidocaine (LIDODERM) 5 % patch 1 patch, Daily    loperamide (IMODIUM) capsule 2 mg, 4x Daily PRN    meclizine (ANTIVERT) tablet 12.5 mg, Daily PRN    melatonin tablet 3 mg, HS PRN    metoprolol succinate  (TOPROL-XL) 24 hr tablet 50 mg, Q12H ARY    metroNIDAZOLE (FLAGYL) IVPB (premix) 500 mg 100 mL, Q8H, Last Rate: 500 mg (11/22/24 1100)    Milnacipran HCl TABS 100 mg, Daily    oxyCODONE (ROXICODONE) immediate release tablet 10 mg, Q6H PRN    oxyCODONE (ROXICODONE) IR tablet 5 mg, Q6H PRN    pantoprazole (PROTONIX) EC tablet 40 mg, Early Morning    sucralfate (CARAFATE) tablet 1 g, BID AC    [Held by provider] torsemide (DEMADEX) tablet 30 mg, BID    zolpidem (AMBIEN) tablet 5 mg, HS PRN    Administrative Statements   Today, Patient Was Seen By: Susannah Mcclain PA-C      **Please Note: This note may have been constructed using a voice recognition system.**

## 2024-11-22 NOTE — ASSESSMENT & PLAN NOTE
- follows with outpatient nephrology  - baseline creatinine ~1.3-1.4, GFR today 26, down trending likely in the setting of poor PO intake vs diarrhea vs Cardiorenal syndrome vs diuretics  -Avoid nephrotoxic agents  -Continue to hold torsemide

## 2024-11-22 NOTE — APP STUDENT NOTE
[facility] STUDENT  Inpatient Progress Note for TRAINING ONLY  Not Part of Legal Medical Record       Progress Note - Mattie Varela 79 y.o. female MRN: [id number]    Unit/Bed#: E4 -01 Encounter: [id number]      Assessment:  Acute on chronic diastolic congestive heart failure   Loose stools   Chest pain   Chronic pain disorder   Patella fracture   Urinary retention   Type 2 DM  Stage 3 CKD  Hypertension   Hypothyroidism   Insomnia     Plan:  Acute on chronic diastolic congestive heart failure   Patient is an 79 y.o. female who presented to the ED with generalized fluid retention over the past several days/weeks and associated fatigue.   CT: colitis of descending an sigmoid colon; no pulmonary embolism.   ECG: left bundle branch block, which has been seen since 2020.  Echo pending.   Home med: torsemide 30 mg daily; inpatient: IV lasix 60 mg BID - currently held due to CT with contrast.   Continue metoprolol for rate control  Weight upon admission was 194 lbs, and patient weight in at 176 pm 11/21/24.  Continue daily weights and I&Os   Recheck troponin.   PT/OT for ambulation   DVT prophylaxis - heparin   Pain medication- tylenol and oxycodone- PRN for pain.   Loose stools   Continue loperamide.   Patient has continued bouts of diarrhea especially after eating and when being turned.   Chest pain   ECG: left bundle branch block, which has been seen since 2020.  Echo pending.   Continue pain management PRN and albuterol for shortness of breath.   Chronic pain disorder  Hx of degenerative spine   Hemo med: PRN percocet   Tylenol and oxycodone as needed.  PT/OT for ambulation   Patella fracture   Patient had a fall  CT of left knee: fracture of bipartite patella  Ortho consulted- plan is non-operative, knee immobilizer, pain control PRN, and follow outpatient in 2 weeks for repeat x-rays.   Urinary retention   Bateman catheter in place.   Type 2 DM  Glucose decreased to 112 today from 152 yesterday.   Patient takes  metformin at home.   Continue insulin inpatient and fingerstick glucose   Stage 3 CKD  Cr elevated at 1.78, may be secondary to contrast for CT yesterday.   Cr from 11/21 was at 1.48.   Continue to monitor daily.   Cardiology- continue holding torsemide.   Hypertension   BP has been controlled throughout hospital stay.   Continue metoprolol for rate control.   Hypothyroidism   Continue levothyroxine.   Insomnia   Continue melatonin and Ambien prior to bedtime for insomnia.   Subjective:   Patient is an 79 y.o. female who presented to the ED with generalized fluid retention over the past several days/weeks and associated fatigue. Patient was examined at bedside this morning while eating her breakfast. She appears uncomfortable due to pain. Patient states she is having a stabbing pain in her chest that radiates to her back on inspiration. She explains that this pain is constant, and does not matter what position she is in. States that the pain medication has not been helping, and rates her pain at a 10 this morning. Due to the pain, she has not been ambulating. She is SOB, and is currently on oxygen. Patient has been eating, but says her appetite isn't great, she's just eating to get her strength back. She states that when she eats, her stomach starts to cause pain. Patient has still been having loose stools. She states she is still having congestion and a scratchy throat.     Objective:     Vitals: Blood pressure 106/55, pulse 83, temperature 97.7 °F (36.5 °C), temperature source Temporal, resp. rate 18, height 5' (1.524 m), weight 80 kg (176 lb 5.9 oz), SpO2 98%, not currently breastfeeding.,Body mass index is 34.44 kg/m².      Intake/Output Summary (Last 24 hours) at 11/22/2024 0934  Last data filed at 11/21/2024 2000  Gross per 24 hour   Intake 240 ml   Output 1350 ml   Net -1110 ml       Physical Exam:    Physical Exam  Vitals and nursing note reviewed.   Constitutional:       General: She is awake.       Appearance: Normal appearance.   Cardiovascular:      Rate and Rhythm: Normal rate and regular rhythm.      Heart sounds: Normal heart sounds.   Pulmonary:      Effort: Pulmonary effort is normal.      Breath sounds: Normal breath sounds and air entry. No decreased air movement. No decreased breath sounds or wheezing.   Abdominal:      General: Bowel sounds are normal.      Tenderness: There is abdominal tenderness.   Neurological:      Mental Status: She is alert.   Psychiatric:         Attention and Perception: Attention normal.         Mood and Affect: Mood is depressed.         Behavior: Behavior normal. Behavior is cooperative.         Invasive Devices       Peripheral Intravenous Line  Duration             Peripheral IV 11/17/24 Right Antecubital 4 days              Drain  Duration             Urethral Catheter 16 Fr. 26 days                    Lab, Imaging and other studies: Results Review Statement: I personally reviewed the following image studies/reports in PACS and discussed pertinent findings with Radiology: CT chest. My interpretation of the radiology images/reports is: no pulmonary embolism.  VTE Pharmacologic Prophylaxis: VTE covered by:  heparin (porcine), Subcutaneous, 5,000 Units at 11/22/24 0840     VTE Mechanical Prophylaxis: sequential compression device

## 2024-11-22 NOTE — DISCHARGE INSTR - OTHER ORDERS
Skin Care Plan:   1-Eucerin lotion to lower legs, feet, and heels 2-3 times per day.  2-Elevate heels off of bed/chair surface--offloading heel boots.  3-Offloading air cushion in chair when out of bed.  4-Moisturize skin daily with lotion.  5-Turn/reposition every 2 hours while in bed and weight shift frequently while in chair for pressure re-distribution on skin.  6-Zula positioning system.  7-Buttocks/sacrum--cleanse with comfort shield wipes, allow to dry.  Apply zinc oxide paste/Calazime three times daily and as needed with incontinence care.  8-Preventative foam dressing to left posterior tibial to protect from immobilizer.  Change dressing every 3 days and as needed.

## 2024-11-22 NOTE — ASSESSMENT & PLAN NOTE
History of chronic pain with degenerative spine and history of spinal stimulator in place  On as needed Percocet at home  C/w oxycodone 5 mg prn for moderate pain & oxycodone 10 mg for severe pain with acute fx  Added dilaudid for breakthrough pain

## 2024-11-22 NOTE — ASSESSMENT & PLAN NOTE
Potassium low at 3.0  Likely secondary to recent diuresis, diarrhea, and poor oral intake  Repleted with oral K  Recheck in a.m.  Check a.m. mag

## 2024-11-22 NOTE — ASSESSMENT & PLAN NOTE
- A1c 10/12/24: 7.4  - Home meds metformin 500 mg PO BID     Plan:  Insulin lispro and SSI  Hold while inpatient   Consider switching to another antidiabetic meds considering her low eGFR (26) and high creatinine.

## 2024-11-22 NOTE — ASSESSMENT & PLAN NOTE
Wt Readings from Last 3 Encounters:   11/22/24 79.8 kg (176 lb)   10/18/24 74.3 kg (163 lb 12.8 oz)   10/02/24 85.5 kg (188 lb 9.6 oz)   79 year old female presented with shortness of breath, lower extremity edema, volume overload, and weight gain secondary to acute on chronic diastolic congestive heart failure.  Echo was updated-EF 51%, systolic function low normal, septic akinetic to paradoxical, endocardial detection was significantly limited.  Unable to assess diastolic function  Takes torsemide 30 mg daily  Receiving IV lasix 40 mg bid --> increased to 60 mg BID  Continue metoprolol 50 mg twice daily  Down about 6.4 L, admission weight 194 pounds, current weight 176 pounds  Cardiology price checking Jardiance-$25 per day-unable to afford  Volume status much improved and cardiology would like to transition to oral diuretics however now holding diuretics given rise in Cr  Continue to monitor

## 2024-11-22 NOTE — ASSESSMENT & PLAN NOTE
Was placed on melatonin however reports this is ineffective  Requested additional sleep aid  Has trialed Ambien in the past and this worked well for her

## 2024-11-22 NOTE — ASSESSMENT & PLAN NOTE
S/p recent fall at [facility]  CT left knee showing what appears to be a fracture of her bipartite patella   Seen by orthopedic team  Knee immobilizer x 2 weeks with WBAT to LLE   Office follow-up in 2 weeks with repeat x-rays   PT OT recommending moderate resource intensity-level 2-discussing with daughter

## 2024-11-22 NOTE — ASSESSMENT & PLAN NOTE
- TTE 05/23/24: LVEF 70%, grade I diastolic dysfunction, mild LVOT obstruction, moderate annular calcification, mild TR   - BNP 11/17/24: 264 (BNP 05/22/24 was 302)  - CXR 11/17/24: no acute cardiopulmonary disease    -Home meds: torsemide 30 mg PO BID  - inpatient diuretic: She was on IV lasix 60 mg BID  -Switched to torsemide 30 mg p.o. twice daily

## 2024-11-22 NOTE — PLAN OF CARE
Problem: Potential for Falls  Goal: Patient will remain free of falls  Description: INTERVENTIONS:  - Educate patient/family on patient safety including physical limitations  - Instruct patient to call for assistance with activity   - Consult OT/PT to assist with strengthening/mobility   - Keep Call bell within reach  - Keep bed low and locked with side rails adjusted as appropriate  - Keep care items and personal belongings within reach  - Initiate and maintain comfort rounds  - Make Fall Risk Sign visible to staff  - Offer Toileting every 2 Hours, in advance of need  - Initiate/Maintain bed alarm  - Obtain necessary fall risk management equipment: alarm  - Apply yellow socks and bracelet for high fall risk patients  - Consider moving patient to room near nurses station  Outcome: Progressing     Problem: PAIN - ADULT  Goal: Verbalizes/displays adequate comfort level or baseline comfort level  Description: Interventions:  - Encourage patient to monitor pain and request assistance  - Assess pain using appropriate pain scale  - Administer analgesics based on type and severity of pain and evaluate response  - Implement non-pharmacological measures as appropriate and evaluate response  - Consider cultural and social influences on pain and pain management  - Notify physician/advanced practitioner if interventions unsuccessful or patient reports new pain  Outcome: Progressing     Problem: SAFETY ADULT  Goal: Patient will remain free of falls  Description: INTERVENTIONS:  - Educate patient/family on patient safety including physical limitations  - Instruct patient to call for assistance with activity   - Consult OT/PT to assist with strengthening/mobility   - Keep Call bell within reach  - Keep bed low and locked with side rails adjusted as appropriate  - Keep care items and personal belongings within reach  - Initiate and maintain comfort rounds  - Make Fall Risk Sign visible to staff  - Offer Toileting every 2 Hours,  in advance of need  - Initiate/Maintain bed alarm  - Apply yellow socks and bracelet for high fall risk patients  - Consider moving patient to room near nurses station  Outcome: Progressing  Goal: Maintain or return to baseline ADL function  Description: INTERVENTIONS:  -  Assess patient's ability to carry out ADLs; assess patient's baseline for ADL function and identify physical deficits which impact ability to perform ADLs (bathing, care of mouth/teeth, toileting, grooming, dressing, etc.)  - Assess/evaluate cause of self-care deficits   - Assess range of motion  - Assess patient's mobility; develop plan if impaired  - Assess patient's need for assistive devices and provide as appropriate  - Encourage maximum independence but intervene and supervise when necessary  - Involve family in performance of ADLs  - Assess for home care needs following discharge   - Consider OT consult to assist with ADL evaluation and planning for discharge  - Provide patient education as appropriate  Outcome: Progressing  Goal: Maintains/Returns to pre admission functional level  Description: INTERVENTIONS:  - Perform AM-PAC 6 Click Basic Mobility/ Daily Activity assessment daily.  - Set and communicate daily mobility goal to care team and patient/family/caregiver.   - Collaborate with rehabilitation services on mobility goals if consulted  - Record patient progress and toleration of activity level   Outcome: Progressing     Problem: DISCHARGE PLANNING  Goal: Discharge to home or other facility with appropriate resources  Description: INTERVENTIONS:  - Identify barriers to discharge w/patient and caregiver  - Arrange for needed discharge resources and transportation as appropriate  - Identify discharge learning needs (meds, wound care, etc.)  - Arrange for interpretive services to assist at discharge as needed  - Refer to Case Management Department for coordinating discharge planning if the patient needs post-hospital services based on  physician/advanced practitioner order or complex needs related to functional status, cognitive ability, or social support system  Outcome: Progressing     Problem: Knowledge Deficit  Goal: Patient/family/caregiver demonstrates understanding of disease process, treatment plan, medications, and discharge instructions  Description: Complete learning assessment and assess knowledge base.  Interventions:  - Provide teaching at level of understanding  - Provide teaching via preferred learning methods  Outcome: Progressing     Problem: CARDIOVASCULAR - ADULT  Goal: Maintains optimal cardiac output and hemodynamic stability  Description: INTERVENTIONS:  - Monitor I/O, vital signs and rhythm  - Monitor for S/S and trends of decreased cardiac output  - Administer and titrate ordered vasoactive medications to optimize hemodynamic stability  - Assess quality of pulses, skin color and temperature  - Assess for signs of decreased coronary artery perfusion  - Instruct patient to report change in severity of symptoms  Outcome: Progressing  Goal: Absence of cardiac dysrhythmias or at baseline rhythm  Description: INTERVENTIONS:  - Continuous cardiac monitoring, vital signs, obtain 12 lead EKG if ordered  - Administer antiarrhythmic and heart rate control medications as ordered  - Monitor electrolytes and administer replacement therapy as ordered  Outcome: Progressing     Problem: RESPIRATORY - ADULT  Goal: Achieves optimal ventilation and oxygenation  Description: INTERVENTIONS:  - Assess for changes in respiratory status  - Assess for changes in mentation and behavior  - Position to facilitate oxygenation and minimize respiratory effort  - Oxygen administered by appropriate delivery if ordered  - Initiate smoking cessation education as indicated  - Encourage broncho-pulmonary hygiene including cough, deep breathe, Incentive Spirometry  - Assess the need for suctioning and aspirate as needed  - Assess and instruct to report SOB or  any respiratory difficulty  - Respiratory Therapy support as indicated  Outcome: Progressing     Problem: Prexisting or High Potential for Compromised Skin Integrity  Goal: Skin integrity is maintained or improved  Description: INTERVENTIONS:  - Identify patients at risk for skin breakdown  - Assess and monitor skin integrity  - Assess and monitor nutrition and hydration status  - Monitor labs   - Assess for incontinence   - Turn and reposition patient  - Assist with mobility/ambulation  - Relieve pressure over bony prominences  - Avoid friction and shearing  - Provide appropriate hygiene as needed including keeping skin clean and dry  - Evaluate need for skin moisturizer/barrier cream  - Collaborate with interdisciplinary team   - Patient/family teaching  - Consider wound care consult   Outcome: Progressing

## 2024-11-22 NOTE — ASSESSMENT & PLAN NOTE
Lab Results   Component Value Date    HGBA1C 7.4 (H) 10/12/2024     Recent Labs     11/22/24  0106 11/22/24  0740 11/22/24  0957 11/22/24  1100   POCGLU 152* 99 222* 260*   Holding Metformin  Continue insulin sliding scale  Hold off on premeal insulin as oral intake has been poor

## 2024-11-22 NOTE — ASSESSMENT & PLAN NOTE
Lab Results   Component Value Date    EGFR 26 11/22/2024    EGFR 32 11/21/2024    EGFR 36 11/20/2024    CREATININE 1.78 (H) 11/22/2024    CREATININE 1.48 (H) 11/21/2024    CREATININE 1.36 (H) 11/20/2024   Baseline around 1.3   Noted SADAF on CKD now  Cr rise likely secondary to recent diuresis and recent dye load   Holding nephrotoxins -diuretics   Switched DVT prophylaxis from Lovenox to heparin

## 2024-11-22 NOTE — PROGRESS NOTES
Progress Note - Cardiology   Name: Mattie Varela 79 y.o. female I MRN: [id number]  Unit/Bed#: E4 -01 I Date of Admission: 11/17/2024   Date of Service: 11/22/2024 I Hospital Day: 5    Assessment & Plan  Acute on chronic diastolic congestive heart failure (HCC)   - TTE 05/23/24: LVEF 70%, grade I diastolic dysfunction, mild LVOT obstruction, moderate annular calcification, mild TR   - BNP 11/17/24: 264 (BNP 05/22/24 was 302)  - CXR 11/17/24: no acute cardiopulmonary disease    -Home meds: torsemide 30 mg PO BID  - inpatient diuretic: She was on IV lasix 60 mg BID  -Switched to torsemide 30 mg p.o. twice daily  Hypertension  - BP elevated on admission with systolic 115-162 mmHg  - home regimen: Toprol-XL 50 mg BID  Type 2 diabetes mellitus with other skin complications ([facility])  - A1c 10/12/24: 7.4  - Home meds metformin 500 mg PO BID     Plan:  Insulin lispro and SSI  Hold while inpatient   Consider switching to another antidiabetic meds considering her low eGFR (26) and high creatinine.     Stage 3a chronic kidney disease (HCC)  - follows with outpatient nephrology  - baseline creatinine ~1.3-1.4, GFR today 26, down trending likely in the setting of poor PO intake vs diarrhea vs Cardiorenal syndrome vs diuretics  -Avoid nephrotoxic agents  -Continue to hold torsemide  Patella fracture  - XR 11/15/24: showing smooth linear lucencies coursing through the superior lateral aspect of the patella, nondisplaced fracture difficult to exclude   - pending CT of lower extremity   - orthopedics following  Urinary retention  - history of urinary retention with spine stimulator in place   - continues to have urinary retention requiring Bateman catheter   - need outpatient voiding trial  Hypothyroidism  - continue levothyroxine   Loose stools  - 11/19/24: C.diff pcr negative  - rapid flu negative on admission   - can consider viral panel with increased pleuritic pain, mucous, and sore throat   Chronic pain  disorder    Insomnia    Chest pain      Plan:  -Current wt 176 pounds per bed scale.   -Creatinine 1.78 today trending up, likely due to poor PO intake vs diarrhea vs diuresis vs cardiorenal syndrome.   -Labs today shows Na 134, K 3.0 Cr 1.78, GFR 26. Her dry weight is 166 lbs.  -Continue holding Torsemide and consider to put her mIVF if she continues to have difficulty PO intake.  -Avoid nephrotoxic agents  - AM CMP  - strict I/O's and daily weights   - Continue ASA 81 mg daily, atorvastatin 40 mg daily and Toprol-XL 50 mg BID  -   -Echo pending official report    Subjective:  Pt seen and examined at bedside during rounds. No significant acute events overnight. She states that she slept well overnight. She states that the diarrhea is getting better and started to eat little bit today. She states it still hurts to take a deep breath in. Current mgt plan was discussed with her and she is agreeable to it.    Vitals:  Vitals:    11/21/24 0600 11/22/24 0958   Weight: 80 kg (176 lb 5.9 oz) 79.8 kg (176 lb)   ,  Vitals:    11/21/24 2248 11/22/24 0737 11/22/24 0958 11/22/24 1005   BP: 93/71 106/55 106/55 115/59   BP Location: Left arm Right arm     Pulse: 90 83 83 104   Resp: 18 18     Temp: 97.6 °F (36.4 °C) 97.7 °F (36.5 °C)     TempSrc: Temporal Temporal     SpO2: 96% 98%  98%   Weight:   79.8 kg (176 lb)    Height:   5' (1.524 m)        Exam:  Physical Exam  Vitals and nursing note reviewed.   Constitutional:       General: She is not in acute distress.     Appearance: Normal appearance. She is not ill-appearing.   HENT:      Head: Normocephalic and atraumatic.      Nose: Nose normal.      Mouth/Throat:      Mouth: Mucous membranes are moist.   Eyes:      General: No scleral icterus.  Neck:      Vascular: No JVD.   Cardiovascular:      Rate and Rhythm: Normal rate and regular rhythm.      Pulses:           Radial pulses are 2+ on the right side and 2+ on the left side.      Heart sounds: Murmur heard.       Systolic murmur is present with a grade of 2/6.   Pulmonary:      Effort: Pulmonary effort is normal. No respiratory distress.      Breath sounds: Normal breath sounds. No stridor. No wheezing, rhonchi or rales.   Abdominal:      General: Abdomen is flat.   Musculoskeletal:         General: No swelling. Normal range of motion.      Cervical back: Normal range of motion.      Right lower leg: No edema.      Left lower leg: No edema.   Skin:     General: Skin is warm and dry.      Capillary Refill: Capillary refill takes less than 2 seconds.   Neurological:      Mental Status: She is alert. Mental status is at baseline.   Psychiatric:         Thought Content: Thought content normal.        Medications:    Current Facility-Administered Medications:     acetaminophen (TYLENOL) tablet 650 mg, 650 mg, Oral, Q6H PRN, Paco Carmona MD, 650 mg at 11/22/24 1053    albuterol (PROVENTIL HFA,VENTOLIN HFA) inhaler 2 puff, 2 puff, Inhalation, Q6H PRN, Paco Carmona MD    aluminum-magnesium hydroxide-simethicone (MAALOX) oral suspension 30 mL, 30 mL, Oral, Q4H PRN, Krystina Preston PA-C, 30 mL at 11/19/24 2159    aspirin chewable tablet 81 mg, 81 mg, Oral, Daily, Paco Carmona MD, 81 mg at 11/22/24 0840    atorvastatin (LIPITOR) tablet 40 mg, 40 mg, Oral, Daily With Dinner, Paco Carmona MD, 40 mg at 11/20/24 1614    baclofen tablet 10 mg, 10 mg, Oral, BID, Paco Carmona MD, 10 mg at 11/22/24 0840    ciprofloxacin (CIPRO) IVPB (premix in 5% dextrose) 400 mg 200 mL, 400 mg, Intravenous, Q24H, Perlita Torres MD, Last Rate: 200 mL/hr at 11/1943, 400 mg at 11/1943    ferrous sulfate tablet 325 mg, 325 mg, Oral, Daily With Breakfast, Paco Carmona MD, 325 mg at 11/22/24 0830    gabapentin (NEURONTIN) capsule 100 mg, 100 mg, Oral, HS, Krystina Preston PA-C, 100 mg at 11/21/24 2158    guaiFENesin (MUCINEX) 12 hr tablet 600 mg, 600 mg, Oral, Q12H ARY, Bi Luke PA-C, 600 mg at 11/22/24 0840     heparin (porcine) subcutaneous injection 5,000 Units, 5,000 Units, Subcutaneous, Q8H ARY, Susananh Mcclain PA-C, 5,000 Units at 11/22/24 0840    insulin lispro (HumALOG/ADMELOG) 100 units/mL subcutaneous injection 1-6 Units, 1-6 Units, Subcutaneous, 4x Daily (AC & HS), 1 Units at 11/22/24 0128 **AND** Fingerstick Glucose (POCT), , , 4x Daily AC and at bedtime, NANY Miller    latanoprost (XALATAN) 0.005 % ophthalmic solution 1 drop, 1 drop, Both Eyes, HS, Paco Carmona MD, 1 drop at 11/21/24 2158    levalbuterol (XOPENEX) inhalation solution 1.25 mg, 1.25 mg, Nebulization, TID, Susannah Mcclain PA-C, 1.25 mg at 11/22/24 0727    levothyroxine tablet 37.5 mcg, 37.5 mcg, Oral, Early Morning, Paco Carmona MD, 37.5 mcg at 11/22/24 0636    lidocaine (LIDODERM) 5 % patch 1 patch, 1 patch, Topical, Daily, Perlita Torres MD, 1 patch at 11/22/24 0840    loperamide (IMODIUM) capsule 2 mg, 2 mg, Oral, 4x Daily PRN, Perlita Torres MD, 2 mg at 11/22/24 0636    meclizine (ANTIVERT) tablet 12.5 mg, 12.5 mg, Oral, Daily PRN, Paco Carmona MD    melatonin tablet 3 mg, 3 mg, Oral, HS PRN, Bi Luke PA-C, 3 mg at 11/21/24 2158    metoprolol succinate (TOPROL-XL) 24 hr tablet 50 mg, 50 mg, Oral, Q12H ARY, Paco Carmona MD, 50 mg at 11/21/24 0813    metroNIDAZOLE (FLAGYL) IVPB (premix) 500 mg 100 mL, 500 mg, Intravenous, Q8H, Perlita Torres MD, Last Rate: 200 mL/hr at 11/22/24 1100, 500 mg at 11/22/24 1100    Milnacipran HCl TABS 100 mg, 1 tablet, Oral, Daily, Paco Carmona MD    oxyCODONE (ROXICODONE) immediate release tablet 10 mg, 10 mg, Oral, Q6H PRN, Krystina Preston PA-C, 10 mg at 11/21/24 0816    oxyCODONE (ROXICODONE) IR tablet 5 mg, 5 mg, Oral, Q6H PRN, Krystina Preston PA-C, 5 mg at 11/22/24 0640    pantoprazole (PROTONIX) EC tablet 40 mg, 40 mg, Oral, Early Morning, Paco Carmona MD, 40 mg at 11/22/24 0636    sucralfate (CARAFATE) tablet 1 g, 1 g, Oral, BID Krystina RHOADES  Lorraine Preston PA-C, 1 g at 11/22/24 0636    [Held by provider] torsemide (DEMADEX) tablet 30 mg, 30 mg, Oral, BID, Rukhsana Dasilva PA-C, 30 mg at 11/21/24 0813    zolpidem (AMBIEN) tablet 5 mg, 5 mg, Oral, HS PRN, Susannah Mcclain PA-C, 5 mg at 11/20/24 2123    Labs/Data:        Results from last 7 days   Lab Units 11/22/24  0351 11/21/24  0929 11/19/24  0551   WBC Thousand/uL 9.62 9.95 9.02   HEMOGLOBIN g/dL 10.2* 11.4* 10.7*   HEMATOCRIT % 31.3* 36.3 33.6*   PLATELETS Thousands/uL 275 256 253     Results from last 7 days   Lab Units 11/22/24  0351 11/21/24  0929 11/20/24  1135   POTASSIUM mmol/L 3.0* 3.7 3.7   CHLORIDE mmol/L 94* 95* 94*   CO2 mmol/L 29 29 30   BUN mg/dL 31* 24 19   CREATININE mg/dL 1.78* 1.48* 1.36*     Celso Dotson MD

## 2024-11-22 NOTE — ASSESSMENT & PLAN NOTE
Had large bowel movement 11/18 and has had frequent bouts of stool after this  C. difficile and enteric pathogen panel negative  COVID, flu, RSV negative  With abdominal pain and poor oral intake  CT abdomen reveals diffuse colonic wall thickening and mucosal hyperenhancement compatible with colitis most prominently involving the descending and sigmoid colon.  Colonic fluid compatible with scattered colonic diverticuli.  Started on IV anbiotics with ceftriaxone and metronidazole  Plan for a 5-day course

## 2024-11-22 NOTE — ASSESSMENT & PLAN NOTE
Complained of chest pain with inspiration  Obtained EKG- without signs of acute ischemia, Trop was 22  Chest x-ray: without pneumonia, effusion, pneumothorax  Patient with risk factors for PE of immobility, recent fracture -PE ruled out with CTA chest  Noted heavy atherosclerotic coronary artery calcification  Will discuss with cardiology for further recommendations / medical management   Check lipid panel

## 2024-11-23 PROBLEM — Z91.89 ELECTROLYTE IMBALANCE RISK: Status: ACTIVE | Noted: 2024-11-23

## 2024-11-23 PROBLEM — Z91.89 AT RISK FOR ACID-BASE IMBALANCE: Status: ACTIVE | Noted: 2024-11-23

## 2024-11-23 LAB
ALBUMIN SERPL BCG-MCNC: 2.8 G/DL (ref 3.5–5)
ALP SERPL-CCNC: 74 U/L (ref 34–104)
ALT SERPL W P-5'-P-CCNC: 5 U/L (ref 7–52)
ANION GAP SERPL CALCULATED.3IONS-SCNC: 10 MMOL/L (ref 4–13)
ANION GAP SERPL CALCULATED.3IONS-SCNC: 10 MMOL/L (ref 4–13)
AST SERPL W P-5'-P-CCNC: 12 U/L (ref 13–39)
ATRIAL RATE: 97 BPM
BILIRUB SERPL-MCNC: 0.28 MG/DL (ref 0.2–1)
BUN SERPL-MCNC: 38 MG/DL (ref 5–25)
BUN SERPL-MCNC: 40 MG/DL (ref 5–25)
CALCIUM ALBUM COR SERPL-MCNC: 9.8 MG/DL (ref 8.3–10.1)
CALCIUM SERPL-MCNC: 8.8 MG/DL (ref 8.4–10.2)
CALCIUM SERPL-MCNC: 9.1 MG/DL (ref 8.4–10.2)
CHLORIDE SERPL-SCNC: 95 MMOL/L (ref 96–108)
CHLORIDE SERPL-SCNC: 97 MMOL/L (ref 96–108)
CHOLEST SERPL-MCNC: 106 MG/DL (ref ?–200)
CO2 SERPL-SCNC: 25 MMOL/L (ref 21–32)
CO2 SERPL-SCNC: 27 MMOL/L (ref 21–32)
CREAT SERPL-MCNC: 3.1 MG/DL (ref 0.6–1.3)
CREAT SERPL-MCNC: 3.14 MG/DL (ref 0.6–1.3)
ERYTHROCYTE [DISTWIDTH] IN BLOOD BY AUTOMATED COUNT: 14.1 % (ref 11.6–15.1)
GFR SERPL CREATININE-BSD FRML MDRD: 13 ML/MIN/1.73SQ M
GFR SERPL CREATININE-BSD FRML MDRD: 13 ML/MIN/1.73SQ M
GLUCOSE SERPL-MCNC: 192 MG/DL (ref 65–140)
GLUCOSE SERPL-MCNC: 193 MG/DL (ref 65–140)
GLUCOSE SERPL-MCNC: 197 MG/DL (ref 65–140)
GLUCOSE SERPL-MCNC: 236 MG/DL (ref 65–140)
GLUCOSE SERPL-MCNC: 297 MG/DL (ref 65–140)
GLUCOSE SERPL-MCNC: 324 MG/DL (ref 65–140)
HCT VFR BLD AUTO: 27.8 % (ref 34.8–46.1)
HDLC SERPL-MCNC: 39 MG/DL
HGB BLD-MCNC: 9 G/DL (ref 11.5–15.4)
LDLC SERPL CALC-MCNC: 44 MG/DL (ref 0–100)
MAGNESIUM SERPL-MCNC: 1.7 MG/DL (ref 1.9–2.7)
MCH RBC QN AUTO: 29 PG (ref 26.8–34.3)
MCHC RBC AUTO-ENTMCNC: 32.4 G/DL (ref 31.4–37.4)
MCV RBC AUTO: 90 FL (ref 82–98)
NONHDLC SERPL-MCNC: 67 MG/DL
P AXIS: 45 DEGREES
PHOSPHATE SERPL-MCNC: 4.5 MG/DL (ref 2.3–4.1)
PLATELET # BLD AUTO: 227 THOUSANDS/UL (ref 149–390)
PMV BLD AUTO: 9.8 FL (ref 8.9–12.7)
POTASSIUM SERPL-SCNC: 3.2 MMOL/L (ref 3.5–5.3)
POTASSIUM SERPL-SCNC: 4.1 MMOL/L (ref 3.5–5.3)
PR INTERVAL: 152 MS
PROT SERPL-MCNC: 5.7 G/DL (ref 6.4–8.4)
QRS AXIS: -8 DEGREES
QRSD INTERVAL: 138 MS
QT INTERVAL: 432 MS
QTC INTERVAL: 549 MS
RBC # BLD AUTO: 3.1 MILLION/UL (ref 3.81–5.12)
SODIUM SERPL-SCNC: 130 MMOL/L (ref 135–147)
SODIUM SERPL-SCNC: 134 MMOL/L (ref 135–147)
T WAVE AXIS: 105 DEGREES
TRIGL SERPL-MCNC: 114 MG/DL (ref ?–150)
VENTRICULAR RATE: 97 BPM
WBC # BLD AUTO: 8.37 THOUSAND/UL (ref 4.31–10.16)

## 2024-11-23 PROCEDURE — 93010 ELECTROCARDIOGRAM REPORT: CPT | Performed by: INTERNAL MEDICINE

## 2024-11-23 PROCEDURE — 99233 SBSQ HOSP IP/OBS HIGH 50: CPT | Performed by: PHYSICIAN ASSISTANT

## 2024-11-23 PROCEDURE — 85027 COMPLETE CBC AUTOMATED: CPT | Performed by: FAMILY MEDICINE

## 2024-11-23 PROCEDURE — 80061 LIPID PANEL: CPT | Performed by: FAMILY MEDICINE

## 2024-11-23 PROCEDURE — 83735 ASSAY OF MAGNESIUM: CPT | Performed by: FAMILY MEDICINE

## 2024-11-23 PROCEDURE — 84100 ASSAY OF PHOSPHORUS: CPT | Performed by: FAMILY MEDICINE

## 2024-11-23 PROCEDURE — 80048 BASIC METABOLIC PNL TOTAL CA: CPT

## 2024-11-23 PROCEDURE — 82948 REAGENT STRIP/BLOOD GLUCOSE: CPT

## 2024-11-23 PROCEDURE — 99232 SBSQ HOSP IP/OBS MODERATE 35: CPT | Performed by: INTERNAL MEDICINE

## 2024-11-23 PROCEDURE — 93005 ELECTROCARDIOGRAM TRACING: CPT

## 2024-11-23 PROCEDURE — 80053 COMPREHEN METABOLIC PANEL: CPT | Performed by: FAMILY MEDICINE

## 2024-11-23 PROCEDURE — 94760 N-INVAS EAR/PLS OXIMETRY 1: CPT

## 2024-11-23 PROCEDURE — 99223 1ST HOSP IP/OBS HIGH 75: CPT | Performed by: INTERNAL MEDICINE

## 2024-11-23 PROCEDURE — 94640 AIRWAY INHALATION TREATMENT: CPT

## 2024-11-23 RX ORDER — CYCLOBENZAPRINE HCL 10 MG
5 TABLET ORAL 2 TIMES DAILY
Status: DISCONTINUED | OUTPATIENT
Start: 2024-11-23 | End: 2024-11-24

## 2024-11-23 RX ORDER — ACETAMINOPHEN 325 MG/1
975 TABLET ORAL EVERY 8 HOURS SCHEDULED
Status: DISCONTINUED | OUTPATIENT
Start: 2024-11-23 | End: 2024-12-02 | Stop reason: HOSPADM

## 2024-11-23 RX ORDER — MAGNESIUM SULFATE HEPTAHYDRATE 40 MG/ML
2 INJECTION, SOLUTION INTRAVENOUS ONCE
Status: COMPLETED | OUTPATIENT
Start: 2024-11-23 | End: 2024-11-23

## 2024-11-23 RX ORDER — DIAZEPAM 2 MG/1
2 TABLET ORAL ONCE
Status: COMPLETED | OUTPATIENT
Start: 2024-11-23 | End: 2024-11-23

## 2024-11-23 RX ORDER — POTASSIUM CHLORIDE 1500 MG/1
40 TABLET, EXTENDED RELEASE ORAL ONCE
Status: COMPLETED | OUTPATIENT
Start: 2024-11-23 | End: 2024-11-23

## 2024-11-23 RX ORDER — METOPROLOL SUCCINATE 50 MG/1
50 TABLET, EXTENDED RELEASE ORAL EVERY 12 HOURS SCHEDULED
Status: DISCONTINUED | OUTPATIENT
Start: 2024-11-23 | End: 2024-11-24

## 2024-11-23 RX ORDER — SODIUM CHLORIDE, SODIUM GLUCONATE, SODIUM ACETATE, POTASSIUM CHLORIDE, MAGNESIUM CHLORIDE, SODIUM PHOSPHATE, DIBASIC, AND POTASSIUM PHOSPHATE .53; .5; .37; .037; .03; .012; .00082 G/100ML; G/100ML; G/100ML; G/100ML; G/100ML; G/100ML; G/100ML
75 INJECTION, SOLUTION INTRAVENOUS CONTINUOUS
Status: DISPENSED | OUTPATIENT
Start: 2024-11-23 | End: 2024-11-23

## 2024-11-23 RX ADMIN — ACETAMINOPHEN 975 MG: 325 TABLET, FILM COATED ORAL at 21:35

## 2024-11-23 RX ADMIN — SUCRALFATE 1 G: 1 TABLET ORAL at 05:39

## 2024-11-23 RX ADMIN — FERROUS SULFATE TAB 325 MG (65 MG ELEMENTAL FE) 325 MG: 325 (65 FE) TAB at 09:01

## 2024-11-23 RX ADMIN — METOPROLOL SUCCINATE 50 MG: 50 TABLET, EXTENDED RELEASE ORAL at 21:34

## 2024-11-23 RX ADMIN — DIAZEPAM 2 MG: 2 TABLET ORAL at 12:35

## 2024-11-23 RX ADMIN — METRONIDAZOLE 500 MG: 5 INJECTION, SOLUTION INTRAVENOUS at 19:07

## 2024-11-23 RX ADMIN — GUAIFENESIN 600 MG: 600 TABLET, EXTENDED RELEASE ORAL at 21:34

## 2024-11-23 RX ADMIN — GABAPENTIN 100 MG: 100 CAPSULE ORAL at 21:34

## 2024-11-23 RX ADMIN — GUAIFENESIN 600 MG: 600 TABLET, EXTENDED RELEASE ORAL at 09:03

## 2024-11-23 RX ADMIN — HYDROMORPHONE HYDROCHLORIDE 0.2 MG: 0.2 INJECTION, SOLUTION INTRAMUSCULAR; INTRAVENOUS; SUBCUTANEOUS at 11:04

## 2024-11-23 RX ADMIN — OXYCODONE HYDROCHLORIDE 10 MG: 10 TABLET ORAL at 09:01

## 2024-11-23 RX ADMIN — BACLOFEN 10 MG: 10 TABLET ORAL at 09:01

## 2024-11-23 RX ADMIN — SODIUM CHLORIDE, SODIUM GLUCONATE, SODIUM ACETATE, POTASSIUM CHLORIDE, MAGNESIUM CHLORIDE, SODIUM PHOSPHATE, DIBASIC, AND POTASSIUM PHOSPHATE 75 ML/HR: .53; .5; .37; .037; .03; .012; .00082 INJECTION, SOLUTION INTRAVENOUS at 05:26

## 2024-11-23 RX ADMIN — HEPARIN SODIUM 5000 UNITS: 5000 INJECTION INTRAVENOUS; SUBCUTANEOUS at 21:36

## 2024-11-23 RX ADMIN — INSULIN LISPRO 2 UNITS: 100 INJECTION, SOLUTION INTRAVENOUS; SUBCUTANEOUS at 08:26

## 2024-11-23 RX ADMIN — PANTOPRAZOLE SODIUM 40 MG: 40 TABLET, DELAYED RELEASE ORAL at 05:18

## 2024-11-23 RX ADMIN — LIDOCAINE 1 PATCH: 50 PATCH TOPICAL at 09:03

## 2024-11-23 RX ADMIN — LEVALBUTEROL HYDROCHLORIDE 1.25 MG: 1.25 SOLUTION RESPIRATORY (INHALATION) at 07:14

## 2024-11-23 RX ADMIN — MAGNESIUM SULFATE HEPTAHYDRATE 2 G: 40 INJECTION, SOLUTION INTRAVENOUS at 05:25

## 2024-11-23 RX ADMIN — HEPARIN SODIUM 5000 UNITS: 5000 INJECTION INTRAVENOUS; SUBCUTANEOUS at 15:06

## 2024-11-23 RX ADMIN — ACETAMINOPHEN 975 MG: 325 TABLET, FILM COATED ORAL at 15:56

## 2024-11-23 RX ADMIN — OXYCODONE HYDROCHLORIDE 10 MG: 10 TABLET ORAL at 19:24

## 2024-11-23 RX ADMIN — INSULIN LISPRO 3 UNITS: 100 INJECTION, SOLUTION INTRAVENOUS; SUBCUTANEOUS at 21:35

## 2024-11-23 RX ADMIN — METRONIDAZOLE 500 MG: 5 INJECTION, SOLUTION INTRAVENOUS at 11:11

## 2024-11-23 RX ADMIN — INSULIN LISPRO 1 UNITS: 100 INJECTION, SOLUTION INTRAVENOUS; SUBCUTANEOUS at 17:09

## 2024-11-23 RX ADMIN — METRONIDAZOLE 500 MG: 5 INJECTION, SOLUTION INTRAVENOUS at 03:58

## 2024-11-23 RX ADMIN — ASPIRIN 81 MG CHEWABLE TABLET 81 MG: 81 TABLET CHEWABLE at 09:01

## 2024-11-23 RX ADMIN — POTASSIUM CHLORIDE 40 MEQ: 1500 TABLET, EXTENDED RELEASE ORAL at 05:18

## 2024-11-23 RX ADMIN — CYCLOBENZAPRINE 5 MG: 10 TABLET, FILM COATED ORAL at 12:35

## 2024-11-23 RX ADMIN — CIPROFLOXACIN 400 MG: 2 INJECTION, SOLUTION INTRAVENOUS at 19:07

## 2024-11-23 RX ADMIN — HEPARIN SODIUM 5000 UNITS: 5000 INJECTION INTRAVENOUS; SUBCUTANEOUS at 05:30

## 2024-11-23 RX ADMIN — INSULIN LISPRO 5 UNITS: 100 INJECTION, SOLUTION INTRAVENOUS; SUBCUTANEOUS at 11:14

## 2024-11-23 RX ADMIN — ATORVASTATIN CALCIUM 40 MG: 40 TABLET, FILM COATED ORAL at 17:05

## 2024-11-23 RX ADMIN — SUCRALFATE 1 G: 1 TABLET ORAL at 17:05

## 2024-11-23 RX ADMIN — LEVALBUTEROL HYDROCHLORIDE 1.25 MG: 1.25 SOLUTION RESPIRATORY (INHALATION) at 14:33

## 2024-11-23 RX ADMIN — METOPROLOL SUCCINATE 50 MG: 50 TABLET, EXTENDED RELEASE ORAL at 09:01

## 2024-11-23 RX ADMIN — LEVOTHYROXINE SODIUM 37.5 MCG: 75 TABLET ORAL at 05:19

## 2024-11-23 NOTE — ASSESSMENT & PLAN NOTE
Results from last 7 days   Lab Units 11/23/24  0304 11/22/24  0351 11/21/24  0929 11/20/24  1135   CREATININE mg/dL 3.10* 1.78* 1.48* 1.36*   Baseline around 1.3   Noted SADAF on CKD now  Cr rise likely secondary to recent diuresis and recent dye load   Received some pre and post scan fluids  Holding nephrotoxins -diuretics   Switched DVT prophylaxis from Lovenox to heparin  Consult nephrology at this time

## 2024-11-23 NOTE — ASSESSMENT & PLAN NOTE
>>ASSESSMENT AND PLAN FOR AT RISK FOR ACID-BASE IMBALANCE WRITTEN ON 11/23/2024 12:37 PM BY RAJANI EPSTEIN MD    -Monitor for now and appropriate     >>ASSESSMENT AND PLAN FOR ELECTROLYTE IMBALANCE RISK WRITTEN ON 11/23/2024 12:37 PM BY RAJANI EPSTEIN MD    -Corrected sodium for blood sugar is only borderline low.  Monitor for now.  See discussion above.

## 2024-11-23 NOTE — ASSESSMENT & PLAN NOTE
- CT 11/21/24: Findings compatible colitis, most prominently involving the descending and sigmoid colon.   - 11/19/24: C.diff pcr negative  - rapid flu negative on admission

## 2024-11-23 NOTE — ASSESSMENT & PLAN NOTE
-History of urinary retention, spine stimulator in place  - Bateman catheter care per primary team  - Was recently treated in October for Klebsiella cystitis

## 2024-11-23 NOTE — ASSESSMENT & PLAN NOTE
Wt Readings from Last 3 Encounters:   11/23/24 80.1 kg (176 lb 9.4 oz)   10/18/24 74.3 kg (163 lb 12.8 oz)   10/02/24 85.5 kg (188 lb 9.6 oz)   79 year old female presented with shortness of breath, lower extremity edema, volume overload, and weight gain secondary to acute on chronic diastolic congestive heart failure.  Echo was updated-EF 51%, systolic function low normal, septic akinetic to paradoxical, endocardial detection was significantly limited.  Unable to assess diastolic function  Takes torsemide 30 mg daily  Received IV lasix 40 mg bid --> increased to 60 mg BID  On metoprolol 50 mg twice daily  Down about 6.3 L, admission weight 194 pounds, current weight stable at 176 pounds  Cardiology price checking [employer]-$25 per day-unable to afford  Volume status much improved and cardiology would like to transition to oral diuretics however now holding diuretics given rise in Cr  Continue to monitor volume status closely

## 2024-11-23 NOTE — ASSESSMENT & PLAN NOTE
- TTE 05/23/24: LVEF 70%, grade I diastolic dysfunction, mild LVOT obstruction, moderate annular calcification, mild TR   - BNP 11/17/24: 264 (BNP 05/22/24 was 302)  - CXR 11/17/24: no acute cardiopulmonary disease    - Neurohormonal Blockade:              -- beta blocker: Toprol-XL 50 mg BID  -- ACE/ARB/ARNi: none  -- aldosterone antagonist: none   -- SGLT2: price checked [employer] and would be $25 a day. Not affordable  -- diuretic: torsemide 30 mg daily --> increased to torsemide 30 mg BID (on hold due to kidney function)  -- inpatient diuretic: IV lasix 60 mg BID --> stopped 11/20/24

## 2024-11-23 NOTE — ASSESSMENT & PLAN NOTE
-Initially there was concern for decompensated heart failure.  Was started on IV diuretics.  Cardiology team was on board.  Diuretics were increased to 60 mg twice daily of IV Lasix.  Patient was appropriately losing weight.  - Also had frequent stool output 11/19  - Weight was improving on bed scale 276 pounds on 11/21, creatinine slightly trending higher 1.48 on 11/21.  Diuretics were held on 11/21 due to slightly rising creatinine and patient needing CT scan with contrast due to complaint of chest pain and risk of PE therefore needed further evaluation per review of notes  - CT imaging with kidneys unremarkable no hydronephrosis  - Urinalysis 11/17 with innumerable WBC  - Overnight 11/22 until 11/23-was noted to be tachycardic.  Was given low-dose intravenous fluids.  - Last dose of IV Lasix was on 11/20 and subsequently received 1 dose of torsemide on 11/21.  Then briefly received IV fluids pre-CT scan.  And then received brief IV fluids on 11/23    Overall, baseline creatinine appears to be low 1 mg/dL, but had acute kidney injury in October admission to 1.7 mg/dL which improved.  Subsequently creatinine remained around 1.2 to 1.3 mg/dL and on this admission 11/17 was 1.2 mg/dL rising to 3.1 mg/dL on 11/23 prompting nephrology consultation.    Etiology-likely multifactorial in the setting of diuresis/possible intravascular depletion/contrast on 11/21 leading to SADAF.  Now with mild hyponatremia developing likely due to lack of free water clearance but when corrected for hyperglycemia, sodium level is only borderline low.  Potassium has been repleted and is appropriate 4.1.  Bicarbonate appropriate..  Patient is developing oliguria.    Plan  - Agree with holding baclofen due to worsening renal function  - I/os; avoid nephrotoxic agents  - Avoid hypotension  - Would be very cautious with Valium use in the setting of severe SADAF  - Agree with low-dose intravenous fluids  - BMP in a.m.  - Would ensure Bateman is  draining well.

## 2024-11-23 NOTE — ASSESSMENT & PLAN NOTE
-History of diastolic heart failure  - Echocardiogram in May with EF 70%, grade 1 diastolic dysfunction, mild tricuspid regurgitation  - Cardiology on board  - Initially was started on IV diuretics and diuretics were increased on 11/18 by cardiology team

## 2024-11-23 NOTE — ASSESSMENT & PLAN NOTE
Lab Results   Component Value Date    HGBA1C 7.4 (H) 10/12/2024     Recent Labs     11/22/24  1614 11/22/24  2156 11/23/24  0742 11/23/24  1109   POCGLU 290* 267* 193* 324*   Holding Metformin  Continue insulin sliding scale  We will add Premeal insulin at 3 units 3 times daily with meals

## 2024-11-23 NOTE — ASSESSMENT & PLAN NOTE
Had large bowel movement 11/18 and has had frequent bouts of stool after this  C. difficile and enteric pathogen panel negative  COVID, flu, RSV negative  Had abdominal pain and poor oral intake  CT abdomen reveals diffuse colonic wall thickening and mucosal hyperenhancement compatible with colitis most prominently involving the descending and sigmoid colon.  Colonic fluid compatible with scattered colonic diverticuli.  Started on IV anbiotics with ceftriaxone and metronidazole  Plan for a 5-day course  Diarrhea subsiding

## 2024-11-23 NOTE — ASSESSMENT & PLAN NOTE
Results from last 7 days   Lab Units 11/23/24  0304 11/22/24  0351 11/21/24  0929 11/20/24  1135   POTASSIUM mmol/L 3.2* 3.0* 3.7 3.7   Likely secondary to recent diuresis, diarrhea, and poor oral intake  Repleting with oral K  Magnesium low at 1.7-repleted  Recheck in a.m.

## 2024-11-23 NOTE — QUICK NOTE
Night Progress Note 11/23/24:  Paged regarding palpitations/dizziness. Noted to be tachycardic. Did not receive metoprolol yesterday due to BP parameters.     Assessment & Plan:  Tachycardia  EKG obtained sinus tachycardia, known LBBB  Lowered hold parameters on Toprol to SBP 100mg  Telemetry ordered  Labs reveal hypokalemia/hypomagnesemia, replenish     SADAF  Suspect pre-renal volume depletion from diuresis, GI losses   Holding diuretics  Added isolyte 75cc/10h   Encourage PO intake

## 2024-11-23 NOTE — CONSULTS
NEPHROLOGY HOSPITAL CONSULTATION   Mattiesa CARLEY Varela 79 y.o. female MRN: [id number]  Unit/Bed#: E4 -01 Encounter: [id number]    Brief History of Admission - 79 y.o. female with a past medical history of CKD stage III, CHF, hypothyroidism, hypertension, diabetes, chronic pain, recent admission for Klebsiella cystitis, who was admitted to [facility] on 11/17 after presenting with worsening shortness of breath and edema. A renal consultation is requested today for assistance in the management of acute kidney injury.    Assessment & Plan  SADAF (acute kidney injury) (HCC)  -Initially there was concern for decompensated heart failure.  Was started on IV diuretics.  Cardiology team was on board.  Diuretics were increased to 60 mg twice daily of IV Lasix.  Patient was appropriately losing weight.  - Also had frequent stool output 11/19  - Weight was improving on bed scale 276 pounds on 11/21, creatinine slightly trending higher 1.48 on 11/21.  Diuretics were held on 11/21 due to slightly rising creatinine and patient needing CT scan with contrast due to complaint of chest pain and risk of PE therefore needed further evaluation per review of notes  - CT imaging with kidneys unremarkable no hydronephrosis  - Urinalysis 11/17 with innumerable WBC  - Overnight 11/22 until 11/23-was noted to be tachycardic.  Was given low-dose intravenous fluids.  - Last dose of IV Lasix was on 11/20 and subsequently received 1 dose of torsemide on 11/21.  Then briefly received IV fluids pre-CT scan.  And then received brief IV fluids on 11/23    Overall, baseline creatinine appears to be low 1 mg/dL, but had acute kidney injury in October admission to 1.7 mg/dL which improved.  Subsequently creatinine remained around 1.2 to 1.3 mg/dL and on this admission 11/17 was 1.2 mg/dL rising to 3.1 mg/dL on 11/23 prompting nephrology consultation.    Etiology-likely multifactorial in the setting of diuresis/possible intravascular depletion/contrast on  11/21 leading to SADAF.  Now with mild hyponatremia developing likely due to lack of free water clearance but when corrected for hyperglycemia, sodium level is only borderline low.  Potassium has been repleted and is appropriate 4.1.  Bicarbonate appropriate..  Patient is developing oliguria.    Plan  - Agree with holding baclofen due to worsening renal function  - I/os; avoid nephrotoxic agents  - Avoid hypotension  - Would be very cautious with Valium use in the setting of severe SADAF  - Agree with low-dose intravenous fluids  - BMP in a.m.  - Would ensure Bateman is draining well.  At risk for acid-base imbalance  -Monitor for now and appropriate  Electrolyte imbalance risk  -Corrected sodium for blood sugar is only borderline low.  Monitor for now.  See discussion above.  Acute on chronic diastolic congestive heart failure (HCC)  -History of diastolic heart failure  - Echocardiogram in May with EF 70%, grade 1 diastolic dysfunction, mild tricuspid regurgitation  - Cardiology on board  - Initially was started on IV diuretics and diuretics were increased on 11/18 by cardiology team  Chronic pain disorder  -Per primary team  Hypertension  -Avoid hypotension.  On beta-blocker.  Hypothyroidism  -Per primary team  Urinary retention  -History of urinary retention, spine stimulator in place  - Bateman catheter care per primary team  - Was recently treated in October for Klebsiella cystitis  Chest pain  -Per cardiology and primary team  - No PE on CT scan  Patella fracture  -Left knee lateral facet patellar fracture-knee immobilizer recommended by orthopedic team  Colitis  -CT scan with concerning for colitis  - Started on antibiotics by primary team    I have reviewed the nephrology recommendations including ,  intravenous fluids, discussed with primary teamand we are in agreement with renal plan including the information outlined above.    HISTORY OF PRESENT ILLNESS:  Requesting Physician: Perlita Torres MD  Reason for  Consult: Acute kidney injury    Mattie Varela is a 79 y.o. female with a past medical history of CKD stage III, CHF, hypothyroidism, hypertension, diabetes, chronic pain, recent admission for Klebsiella cystitis, who was admitted to [facility] on 11/17 after presenting with worsening shortness of breath and edema. A renal consultation is requested today for assistance in the management of acute kidney injury..  Patient initially presented with decompensated heart failure.  Was started on IV diuretics.  Subsequently diuretics were held due to rising creatinine and concern for volume depletion and patient received CT with contrast on 11/21.  Was started on treatment for colitis.  No PE.    PAST MEDICAL HISTORY:  Past Medical History:   Diagnosis Date    Acid reflux     Acute kidney injury (HCC) 06/18/2022    Anemia     hx of iron-deficient    Anxiety     Arthritis     Asthma     last needed inhaler last year 2020    Basal cell carcinoma     upper lip    Chronic narcotic dependence (HCC)     Chronic pain     Colitis 11/19/2024    Colon polyp     Cystocele     Diabetes mellitus (HCC)     stable    Disease of thyroid gland     hypothyroidism    Diverticulosis     Dizziness     at times    Dysfunctional uterine bleeding     last assessed - [id number]    Dysphagia     Fibromyalgia     Gastric ulcer     Gastroparesis     History of colonic polyps     last assessed - [id number]    History of gastroesophageal reflux (GERD)     Hypercholesterolemia     Hyperlipidemia     Hypertension     Hyponatremia 01/13/2024    IBS (irritable bowel syndrome)     Pneumobilia 06/18/2022    Post laminectomy syndrome     S/P insertion of spinal cord stimulator 07/18/2018    s/p [employer] loop recorder 3/2/2024 03/02/2024    Seasonal allergies     Shortness of breath     exertional    Spinal stenosis     Status post lumbar spinal fusion 03/16/2018    Stroke (HCC)     pt states slight stroke March 2022       PAST SURGICAL HISTORY:  Past Surgical  History:   Procedure Laterality Date    APPENDECTOMY      BACK SURGERY      BREAST CYST EXCISION Left     CARDIAC CATHETERIZATION  11/14/2023    Procedure: Cardiac catheterization;  Surgeon: Randolph Holt MD;  [facility];  Service: Cardiology    CARDIAC CATHETERIZATION N/A 11/14/2023    Procedure: Cardiac Coronary Angiogram;  Surgeon: Randolph Holt MD;  [facility];  Service: Cardiology    CARDIAC ELECTROPHYSIOLOGY PROCEDURE N/A 3/2/2024    Procedure: Cardiac loop recorder implant;  Surgeon: Edu Fontaine MD;  [facility];  Service: Cardiology    CHOLECYSTECTOMY      COLONOSCOPY      ESOPHAGOGASTRODUODENOSCOPY N/A 09/28/2016    Procedure: ESOPHAGOGASTRODUODENOSCOPY (EGD);  Surgeon: Mylene Moeller MD;  Location: [facility];  Service:     HERNIA REPAIR      HYSTERECTOMY      TTAH-BSO age 28    LAMINECTOMY      LUMBAR LAMINECTOMY      OOPHORECTOMY      age 28    MN ARTHRODESIS POSTERIOR/PSTLAT TQ 1NTRSPC THORACIC N/A 06/04/2018    Procedure: Reopening of lumbar incision for T12-L5 posterior instrumented fixation and fusion and T12-L4 posterior decompression;  Surgeon: Wood Rogel MD;  Location: [facility];  Service: Neurosurgery    MN COLONOSCOPY FLX DX W/COLLJ SPEC WHEN PFRMD N/A 03/02/2016    Procedure: EGD AND COLONOSCOPY;  Surgeon: Mylene Moeller MD;  [facility]: [facility];  Service: Gastroenterology    MN DILATION ESOPH UNGUIDED SOUND/BOUGIE 1/MULT PASS N/A 09/28/2016    Procedure: DILATATION ESOPHAGEAL;  Surgeon: Mylene Moeller MD;  [facility]: [facility];  Service: Gastroenterology    MN ESOPHAGOGASTRODUODENOSCOPY TRANSORAL DIAGNOSTIC N/A 07/18/2016    Procedure: ESOPHAGOGASTRODUODENOSCOPY (EGD);  Surgeon: Mylene Moeller MD;  [facility];  Service: Gastroenterology    MN INSJ/RPLCMT SPINAL NPG/RCVR POCKET CRTJ&CONNJ Left 06/04/2018    Procedure: removal of left buttock implantable pulse generator and placement of new  implantable pulse generator;   Surgeon: Wood Rogel MD;  [facility];  Service: Neurosurgery       ALLERGIES:  Allergies   Allergen Reactions    Penicillins Anaphylaxis, Hives and Other (See Comments)     Other reaction(s): Unknown Reaction    Sulfa Antibiotics Anaphylaxis and Other (See Comments)     Other reaction(s): Unknown Reaction    Aspartame - Food Allergy Other (See Comments) and Hypertension     Slurred speech, weakness, stroke sx    Iodinated Contrast Media Hives     Pt has taken prep prior for contrast and has not had any break through reaction    Keflex [Cephalexin] Hives       SOCIAL HISTORY:  Social History     Substance and Sexual Activity   Alcohol Use Not Currently    Comment: Denied history of alcohol use     Social History     Substance and Sexual Activity   Drug Use No    Comment: Denied history of drug use     Social History     Tobacco Use   Smoking Status Former    Current packs/day: 0.00    Types: Cigarettes    Quit date: 1970    Years since quittin.9   Smokeless Tobacco Never   Tobacco Comments    Denied history of current ever day smoker, Former smoker and Never smoker all documented in [employer]       FAMILY HISTORY:  Family History   Problem Relation Age of Onset    Lung cancer Mother 46    Pulmonary embolism Father     No Known Problems Sister     No Known Problems Daughter     No Known Problems Daughter     Stroke Maternal Grandmother     Heart attack Maternal Grandfather     No Known Problems Paternal Grandmother     No Known Problems Paternal Grandfather     No Known Problems Maternal Aunt     Diabetes Family         Diabetes mellitus    Hypertension Family     Stroke Family         Stroke complications       MEDICATIONS:    Current Facility-Administered Medications:     acetaminophen (TYLENOL) tablet 975 mg, 975 mg, Oral, Q8H ARY, Susannah Mcclain PA-C    albuterol (PROVENTIL HFA,VENTOLIN HFA) inhaler 2 puff, 2 puff, Inhalation, Q6H PRN, Paco Carmona MD    aluminum-magnesium  hydroxide-simethicone (MAALOX) oral suspension 30 mL, 30 mL, Oral, Q4H PRN, Krystina Preston PA-C, 30 mL at 11/19/24 2159    aspirin chewable tablet 81 mg, 81 mg, Oral, Daily, Paco Carmona MD, 81 mg at 11/23/24 0901    atorvastatin (LIPITOR) tablet 40 mg, 40 mg, Oral, Daily With Dinner, Paco Carmona MD, 40 mg at 11/22/24 1702    ciprofloxacin (CIPRO) IVPB (premix in 5% dextrose) 400 mg 200 mL, 400 mg, Intravenous, Q24H, Perlita Torres MD, Last Rate: 200 mL/hr at 11/22/24 1835, 400 mg at 11/22/24 1835    cyclobenzaprine (FLEXERIL) tablet 5 mg, 5 mg, Oral, BID, Susannah Mcclain PA-C    diazepam (VALIUM) tablet 2 mg, 2 mg, Oral, Once, Susannah Mcclain PA-C    gabapentin (NEURONTIN) capsule 100 mg, 100 mg, Oral, HS, Krystina Preston PA-C, 100 mg at 11/22/24 2120    guaiFENesin (MUCINEX) 12 hr tablet 600 mg, 600 mg, Oral, Q12H [facility], 600 mg at 11/23/24 0903    heparin (porcine) subcutaneous injection 5,000 Units, 5,000 Units, Subcutaneous, Q8H [facility], Susannah Mcclain PA-C, 5,000 Units at 11/23/24 0530    HYDROmorphone HCl (DILAUDID) injection 0.2 mg, 0.2 mg, Intravenous, Q6H PRN, Susannah Mcclain PA-C, 0.2 mg at 11/23/24 1104    insulin lispro (HumALOG/ADMELOG) 100 units/mL subcutaneous injection 1-6 Units, 1-6 Units, Subcutaneous, 4x Daily (AC & HS), 5 Units at 11/23/24 1114 **AND** Fingerstick Glucose (POCT), , , 4x Daily AC and at bedtime, NANY Miller    latanoprost (XALATAN) 0.005 % ophthalmic solution 1 drop, 1 drop, Both Eyes, HS, aPco Carmona MD, 1 drop at 11/22/24 2125    levalbuterol (XOPENEX) inhalation solution 1.25 mg, 1.25 mg, Nebulization, TID, Susannah Mcclain PA-C, 1.25 mg at 11/23/24 0714    levothyroxine tablet 37.5 mcg, 37.5 mcg, Oral, Early Morning, Paco Carmona MD, 37.5 mcg at 11/23/24 0519    lidocaine (LIDODERM) 5 % patch 1 patch, 1 patch, Topical, Daily, Perlita Torres MD, 1 patch at 11/23/24 0903    loperamide (IMODIUM) capsule 2 mg, 2  mg, Oral, 4x Daily PRN, Perlita Torres MD, 2 mg at 11/22/24 0636    meclizine (ANTIVERT) tablet 12.5 mg, 12.5 mg, Oral, Daily PRN, Paco Carmona MD    melatonin tablet 3 mg, 3 mg, Oral, HS PRN, Bi Luke PA-C, 3 mg at 11/22/24 2139    metoprolol succinate (TOPROL-XL) 24 hr tablet 50 mg, 50 mg, Oral, Q12H ARY, Brayden Thompson PA-C, 50 mg at 11/23/24 0901    metroNIDAZOLE (FLAGYL) IVPB (premix) 500 mg 100 mL, 500 mg, Intravenous, Q8H, Perlita Torres MD, Last Rate: 200 mL/hr at 11/23/24 1111, 500 mg at 11/23/24 1111    Milnacipran HCl TABS 100 mg, 1 tablet, Oral, Daily, Paco Carmona MD    multi-electrolyte (PLASMALYTE-A/ISOLYTE-S PH 7.4) IV solution, 75 mL/hr, Intravenous, Continuous, Brayden Thompson PA-C, Last Rate: 75 mL/hr at 11/23/24 0526, 75 mL/hr at 11/23/24 0526    oxyCODONE (ROXICODONE) immediate release tablet 10 mg, 10 mg, Oral, Q6H PRN, Krystina Preston PA-C, 10 mg at 11/23/24 0901    oxyCODONE (ROXICODONE) IR tablet 5 mg, 5 mg, Oral, Q6H PRN, Krystina Preston PA-C, 5 mg at 11/22/24 0640    pantoprazole (PROTONIX) EC tablet 40 mg, 40 mg, Oral, Early Morning, Paco Carmona MD, 40 mg at 11/23/24 0518    sucralfate (CARAFATE) tablet 1 g, 1 g, Oral, BID AC, Krystina Preston PA-C, 1 g at 11/23/24 0539    [Held by provider] torsemide (DEMADEX) tablet 30 mg, 30 mg, Oral, BID, Rukhsana Dasilva PA-C, 30 mg at 11/21/24 0813    zolpidem (AMBIEN) tablet 5 mg, 5 mg, Oral, HS PRN, Susannah Archerr, PA-C, 5 mg at 11/20/24 2123    REVIEW OF SYSTEMS:    All the systems were reviewed and were negative except as documented on the HPI.    PHYSICAL EXAM:  Current Weight: Weight - Scale: 80.1 kg (176 lb 9.4 oz)  First Weight: Weight - Scale: 88.4 kg (194 lb 14.2 oz)  Vitals:    11/23/24 0600 11/23/24 0714 11/23/24 0740 11/23/24 1104   BP:   148/65    BP Location:   Right arm    Pulse:   101    Resp:   18    Temp:   97.5 °F (36.4 °C)    TempSrc:   Temporal    SpO2:  95% 100% 97%    Weight: 80.1 kg (176 lb 9.4 oz)      Height:           Intake/Output Summary (Last 24 hours) at 11/23/2024 1221  Last data filed at 11/23/2024 1125  Gross per 24 hour   Intake 720 ml   Output 575 ml   Net 145 ml     Physical Exam  General: NAD  Skin: no rash  Eyes: anicteric sclera  ENT: moist mucous membrane  Neck: supple  Chest: CTA b/l, no ronchii, no wheeze, no rubs, no rales but diminished intake bases  CVS: s1s2, positive systolic murmur, no gallop, no rub  Abdomen: soft, nontender, nl sounds  Extremities: 1+ edema LE b/l, lower extremity wounds wrapped  : no ahn  Neuro: AAOX3  Psych: normal affect      Invasive Devices:   Urethral Catheter 16 Fr. (Active)   Amt returned on insertion(mL) 500 mL 11/19/24 1900   Reasons to continue Urinary Catheter  Acute urinary retention/obstruction failing urinary retention protocol 11/18/24 2145   Goal for Removal No longer needed- Will place order to discontinue 11/18/24 2145   Site Assessment Clean;Skin intact 11/22/24 2015   Ahn Care Done 11/23/24 0900   Collection Container Standard drainage bag 11/22/24 2015   Securement Method Securing device (Describe) 11/22/24 2015   Output (mL) 300 mL 11/23/24 1125     Lab Results:   Results from last 7 days   Lab Units 11/23/24  1132 11/23/24  0305 11/23/24  0304 11/22/24  0351 11/21/24  0929 11/20/24  1135 11/19/24  0551 11/18/24  0526   WBC Thousand/uL  --  8.37  --  9.62 9.95  --  9.02 5.74   HEMOGLOBIN g/dL  --  9.0*  --  10.2* 11.4*  --  10.7* 9.2*   HEMATOCRIT %  --  27.8*  --  31.3* 36.3  --  33.6* 29.3*   PLATELETS Thousands/uL  --  227  --  275 256  --  253 235   POTASSIUM mmol/L 4.1  --  3.2* 3.0* 3.7   < > 3.9 4.2   CHLORIDE mmol/L 95*  --  97 94* 95*   < > 99 100   CO2 mmol/L 25  --  27 29 29   < > 29 30   BUN mg/dL 40*  --  38* 31* 24   < > 22 28*   CREATININE mg/dL 3.14*  --  3.10* 1.78* 1.48*   < > 1.23 1.33*   CALCIUM mg/dL 9.1  --  8.8 9.1 9.4   < > 9.1 8.8   MAGNESIUM mg/dL  --   --  1.7*  --   --   --   "2.0 1.6*   PHOSPHORUS mg/dL  --   --  4.5*  --   --   --   --   --    ALK PHOS U/L  --   --  74 70 81  --   --  82   ALT U/L  --   --  5* 6* 5*  --   --  5*   AST U/L  --   --  12* 15 25  --   --  11*    < > = values in this interval not displayed.         Portions of the record may have been created with voice recognition software. Occasional wrong word or \"sound a like\" substitutions may have occurred due to the inherent limitations of voice recognition software. Read the chart carefully and recognize, using context, where substitutions have occurred.If you have any questions, please contact the dictating provider.    "

## 2024-11-23 NOTE — PROGRESS NOTES
Progress Note - Cardiology   Name: Mattie Varela 79 y.o. female I MRN: [id number]  Unit/Bed#: E4 -01 I Date of Admission: 11/17/2024   Date of Service: 11/23/2024 I Hospital Day: 6    Assessment & Plan  Acute on chronic diastolic congestive heart failure (HCC)   - TTE 05/23/24: LVEF 70%, grade I diastolic dysfunction, mild LVOT obstruction, moderate annular calcification, mild TR   - BNP 11/17/24: 264 (BNP 05/22/24 was 302)  - CXR 11/17/24: no acute cardiopulmonary disease    - Neurohormonal Blockade:              -- beta blocker: Toprol-XL 50 mg BID  -- ACE/ARB/ARNi: none  -- aldosterone antagonist: none   -- SGLT2: price checked jardiance and would be $25 a day. Not affordable  -- diuretic: torsemide 30 mg daily --> increased to torsemide 30 mg BID (on hold due to kidney function)  -- inpatient diuretic: IV lasix 60 mg BID --> stopped 11/20/24  Hypertension  - BP elevated on admission with systolic 115-162 mmHg  - home regimen: Toprol-XL 50 mg BID  Type 2 diabetes mellitus with other skin complications (Formerly [facility])  - A1c 10/12/24: 7.4  - Home meds metformin 500 mg PO BID   Stage 3a chronic kidney disease (HCC)  - follows with outpatient nephrology  - baseline creatinine ~1.3-1.4  Patella fracture  - XR 11/15/24: showing smooth linear lucencies coursing through the superior lateral aspect of the patella, nondisplaced fracture difficult to exclude   - pending CT of lower extremity   - orthopedics following  Urinary retention  - history of urinary retention with spine stimulator in place   - continues to have urinary retention requiring Bateman catheter   - need outpatient voiding trial  Hypothyroidism  - continue levothyroxine   Colitis  - CT 11/21/24: Findings compatible colitis, most prominently involving the descending and sigmoid colon.   - 11/19/24: C.diff pcr negative  - rapid flu negative on admission     Plan:  Kidney function continues to elevate likely prerenal from GI losses and earlier course of IV  diuresis. BP and HR stable. Stable weight per bed scale at 176 lbs.   - hold torsemide for now and any other nephrotoxic agents  - discussed with [facility] and will get nephrology involved, suspect dye from contrast also contributing to rise in creatinine  - continues with low back pain, not acute changes on her last CT spine 11/15/24  - continue metoprolol   - will check updated BMP after electrolytes were replenished     Subjective:  Pt seen and examined at beside. Overnight patient had episdoe of palpitations/dizziness. EKG showed sinus tachycardia. She did not receive her Toprol-XL due to BP parameters. STAT labs revealed hypokalemia and hypomagnesia which were replenished. Labs showed increased creatinine, now 3.10. States she feels okay, mostly complains of back pain    Vitals:  Vitals:    11/22/24 0958 11/23/24 0600   Weight: 79.8 kg (176 lb) 80.1 kg (176 lb 9.4 oz)   ,  Vitals:    11/22/24 2355 11/23/24 0600 11/23/24 0714 11/23/24 0740   BP: 100/59   148/65   BP Location: Right arm   Right arm   Pulse: 94   101   Resp: 18   18   Temp: 97.9 °F (36.6 °C)   97.5 °F (36.4 °C)   TempSrc: Temporal   Temporal   SpO2: 96%  95% 100%   Weight:  80.1 kg (176 lb 9.4 oz)     Height:           Exam:  Physical Exam  Vitals and nursing note reviewed.   Constitutional:       General: She is not in acute distress.     Appearance: Normal appearance. She is not ill-appearing.      Interventions: Nasal cannula in place.      Comments: Appears in pain secondary to back   HENT:      Head: Normocephalic and atraumatic.      Nose: Nose normal.      Mouth/Throat:      Mouth: Mucous membranes are moist.   Eyes:      General: No scleral icterus.  Neck:      Vascular: No JVD.   Cardiovascular:      Rate and Rhythm: Normal rate and regular rhythm.      Pulses:           Radial pulses are 2+ on the right side and 2+ on the left side.      Heart sounds: No murmur heard.  Pulmonary:      Effort: Pulmonary effort is normal. No respiratory  distress.      Breath sounds: Normal breath sounds. No stridor. No wheezing, rhonchi or rales.      Comments: Limited exam due to back pain  Abdominal:      General: Abdomen is flat.   Musculoskeletal:         General: No swelling. Normal range of motion.      Cervical back: Normal range of motion.      Right lower leg: No edema.      Left lower leg: No edema.   Skin:     General: Skin is warm and dry.      Capillary Refill: Capillary refill takes less than 2 seconds.   Neurological:      Mental Status: She is alert. Mental status is at baseline.   Psychiatric:         Thought Content: Thought content normal.        Telemetry:       tachycardic, Heart Rate 101    Medications:    Current Facility-Administered Medications:     acetaminophen (TYLENOL) tablet 650 mg, 650 mg, Oral, Q6H PRN, Paco Carmona MD, 650 mg at 11/22/24 1053    albuterol (PROVENTIL HFA,VENTOLIN HFA) inhaler 2 puff, 2 puff, Inhalation, Q6H PRN, Paco Carmona MD    aluminum-magnesium hydroxide-simethicone (MAALOX) oral suspension 30 mL, 30 mL, Oral, Q4H PRN, Krystina Preston PA-C, 30 mL at 11/19/24 2159    aspirin chewable tablet 81 mg, 81 mg, Oral, Daily, Paco Carmona MD, 81 mg at 11/23/24 0901    atorvastatin (LIPITOR) tablet 40 mg, 40 mg, Oral, Daily With Dinner, Paco Carmona MD, 40 mg at 11/22/24 1702    baclofen tablet 10 mg, 10 mg, Oral, BID, Paco Carmona MD, 10 mg at 11/23/24 0901    ciprofloxacin (CIPRO) IVPB (premix in 5% dextrose) 400 mg 200 mL, 400 mg, Intravenous, Q24H, Perlita Torres MD, Last Rate: 200 mL/hr at 11/22/24 1835, 400 mg at 11/22/24 1835    ferrous sulfate tablet 325 mg, 325 mg, Oral, Daily With Breakfast, Paco Carmona MD, 325 mg at 11/23/24 0901    gabapentin (NEURONTIN) capsule 100 mg, 100 mg, Oral, HS, Krystina Preston PA-C, 100 mg at 11/22/24 2120    guaiFENesin (MUCINEX) 12 hr tablet 600 mg, 600 mg, Oral, Q12H ARY, Bi Luke PA-C, 600 mg at 11/23/24 0903    heparin (porcine)  subcutaneous injection 5,000 Units, 5,000 Units, Subcutaneous, Q8H ARY, Susannah Mcclain PA-C, 5,000 Units at 11/23/24 0530    HYDROmorphone HCl (DILAUDID) injection 0.2 mg, 0.2 mg, Intravenous, Q6H PRN, Susannah Mcclain PA-C, 0.2 mg at 11/22/24 1439    insulin lispro (HumALOG/ADMELOG) 100 units/mL subcutaneous injection 1-6 Units, 1-6 Units, Subcutaneous, 4x Daily (AC & HS), 2 Units at 11/23/24 0826 **AND** Fingerstick Glucose (POCT), , , 4x Daily AC and at bedtime, NANY Miller    latanoprost (XALATAN) 0.005 % ophthalmic solution 1 drop, 1 drop, Both Eyes, HS, Paco Carmona MD, 1 drop at 11/22/24 2125    levalbuterol (XOPENEX) inhalation solution 1.25 mg, 1.25 mg, Nebulization, TID, Susannah Mcclain PA-C, 1.25 mg at 11/23/24 0714    levothyroxine tablet 37.5 mcg, 37.5 mcg, Oral, Early Morning, Paco Carmona MD, 37.5 mcg at 11/23/24 0519    lidocaine (LIDODERM) 5 % patch 1 patch, 1 patch, Topical, Daily, Perlita Torres MD, 1 patch at 11/23/24 0903    loperamide (IMODIUM) capsule 2 mg, 2 mg, Oral, 4x Daily PRN, Perlita Torres MD, 2 mg at 11/22/24 0636    meclizine (ANTIVERT) tablet 12.5 mg, 12.5 mg, Oral, Daily PRN, Paco Carmona MD    melatonin tablet 3 mg, 3 mg, Oral, HS PRN, Bi Luke PA-C, 3 mg at 11/22/24 2139    metoprolol succinate (TOPROL-XL) 24 hr tablet 50 mg, 50 mg, Oral, Q12H ARY, Brayden Thompson PA-C, 50 mg at 11/23/24 0901    metroNIDAZOLE (FLAGYL) IVPB (premix) 500 mg 100 mL, 500 mg, Intravenous, Q8H, Perlita Torres MD, Last Rate: 200 mL/hr at 11/23/24 0358, 500 mg at 11/23/24 0358    Milnacipran HCl TABS 100 mg, 1 tablet, Oral, Daily, Paco Carmona MD    multi-electrolyte (PLASMALYTE-A/ISOLYTE-S PH 7.4) IV solution, 75 mL/hr, Intravenous, Continuous, Brayden Thompson PA-C, Last Rate: 75 mL/hr at 11/23/24 0526, 75 mL/hr at 11/23/24 0526    oxyCODONE (ROXICODONE) immediate release tablet 10 mg, 10 mg, Oral, Q6H PRN, Krystina Preston PA-C, 10 mg at 11/23/24 0901     oxyCODONE (ROXICODONE) IR tablet 5 mg, 5 mg, Oral, Q6H PRN, Krystina Preston PA-C, 5 mg at 11/22/24 0640    pantoprazole (PROTONIX) EC tablet 40 mg, 40 mg, Oral, Early Morning, Paco Carmona MD, 40 mg at 11/23/24 0518    sucralfate (CARAFATE) tablet 1 g, 1 g, Oral, BID AC, Krystina Preston PA-C, 1 g at 11/23/24 0539    [Held by provider] torsemide (DEMADEX) tablet 30 mg, 30 mg, Oral, BID, Rukhsana Dasilva PA-C, 30 mg at 11/21/24 0813    zolpidem (AMBIEN) tablet 5 mg, 5 mg, Oral, HS PRN, Susannah Mcclain PA-C, 5 mg at 11/20/24 2123    Labs/Data:        Results from last 7 days   Lab Units 11/23/24  0305 11/22/24  0351 11/21/24  0929   WBC Thousand/uL 8.37 9.62 9.95   HEMOGLOBIN g/dL 9.0* 10.2* 11.4*   HEMATOCRIT % 27.8* 31.3* 36.3   PLATELETS Thousands/uL 227 275 256     Results from last 7 days   Lab Units 11/23/24  0304 11/22/24  0351 11/21/24  0929   POTASSIUM mmol/L 3.2* 3.0* 3.7   CHLORIDE mmol/L 97 94* 95*   CO2 mmol/L 27 29 29   BUN mg/dL 38* 31* 24   CREATININE mg/dL 3.10* 1.78* 1.48*     Rukhsana Dasilva PA-C

## 2024-11-23 NOTE — PLAN OF CARE
Problem: Potential for Falls  Goal: Patient will remain free of falls  Description: INTERVENTIONS:  - Educate patient/family on patient safety including physical limitations  - Instruct patient to call for assistance with activity   - Consult OT/PT to assist with strengthening/mobility   - Keep Call bell within reach  - Keep bed low and locked with side rails adjusted as appropriate  - Keep care items and personal belongings within reach  - Initiate and maintain comfort rounds  - Make Fall Risk Sign visible to staff  - Offer Toileting every 3 Hours, in advance of need  - Initiate/Maintain bed alarm  - Obtain necessary fall risk management equipment:   - Apply yellow socks and bracelet for high fall risk patients  - Consider moving patient to room near nurses station  Outcome: Progressing     Problem: PAIN - ADULT  Goal: Verbalizes/displays adequate comfort level or baseline comfort level  Description: Interventions:  - Encourage patient to monitor pain and request assistance  - Assess pain using appropriate pain scale  - Administer analgesics based on type and severity of pain and evaluate response  - Implement non-pharmacological measures as appropriate and evaluate response  - Consider cultural and social influences on pain and pain management  - Notify physician/advanced practitioner if interventions unsuccessful or patient reports new pain  Outcome: Progressing     Problem: SAFETY ADULT  Goal: Patient will remain free of falls  Description: INTERVENTIONS:  - Educate patient/family on patient safety including physical limitations  - Instruct patient to call for assistance with activity   - Consult OT/PT to assist with strengthening/mobility   - Keep Call bell within reach  - Keep bed low and locked with side rails adjusted as appropriate  - Keep care items and personal belongings within reach  - Initiate and maintain comfort rounds  - Make Fall Risk Sign visible to staff  - Offer Toileting every 3 Hours, in  advance of need  - Initiate/Maintain bed alarm  - Obtain necessary fall risk management equipment:   - Apply yellow socks and bracelet for high fall risk patients  - Consider moving patient to room near nurses station  Outcome: Progressing  Goal: Maintain or return to baseline ADL function  Description: INTERVENTIONS:  -  Assess patient's ability to carry out ADLs; assess patient's baseline for ADL function and identify physical deficits which impact ability to perform ADLs (bathing, care of mouth/teeth, toileting, grooming, dressing, etc.)  - Assess/evaluate cause of self-care deficits   - Assess range of motion  - Assess patient's mobility; develop plan if impaired  - Assess patient's need for assistive devices and provide as appropriate  - Encourage maximum independence but intervene and supervise when necessary  - Involve family in performance of ADLs  - Assess for home care needs following discharge   - Consider OT consult to assist with ADL evaluation and planning for discharge  - Provide patient education as appropriate  Outcome: Progressing  Goal: Maintains/Returns to pre admission functional level  Description: INTERVENTIONS:  - Perform AM-PAC 6 Click Basic Mobility/ Daily Activity assessment daily.  - Set and communicate daily mobility goal to care team and patient/family/caregiver.   - Collaborate with rehabilitation services on mobility goals if consulted  - Perform Range of Motion 3 times a day.  - Reposition patient every 2 hours.  - Dangle patient 3 times a day  - Stand patient 3 times a day  - Ambulate patient 3 times a day  - Out of bed to chair 3 times a day   - Out of bed for meals 3 times a day  - Out of bed for toileting  - Record patient progress and toleration of activity level   Outcome: Progressing     Problem: DISCHARGE PLANNING  Goal: Discharge to home or other facility with appropriate resources  Description: INTERVENTIONS:  - Identify barriers to discharge w/patient and caregiver  -  Arrange for needed discharge resources and transportation as appropriate  - Identify discharge learning needs (meds, wound care, etc.)  - Arrange for interpretive services to assist at discharge as needed  - Refer to Case Management Department for coordinating discharge planning if the patient needs post-hospital services based on physician/advanced practitioner order or complex needs related to functional status, cognitive ability, or social support system  Outcome: Progressing     Problem: Knowledge Deficit  Goal: Patient/family/caregiver demonstrates understanding of disease process, treatment plan, medications, and discharge instructions  Description: Complete learning assessment and assess knowledge base.  Interventions:  - Provide teaching at level of understanding  - Provide teaching via preferred learning methods  Outcome: Progressing     Problem: CARDIOVASCULAR - ADULT  Goal: Maintains optimal cardiac output and hemodynamic stability  Description: INTERVENTIONS:  - Monitor I/O, vital signs and rhythm  - Monitor for S/S and trends of decreased cardiac output  - Administer and titrate ordered vasoactive medications to optimize hemodynamic stability  - Assess quality of pulses, skin color and temperature  - Assess for signs of decreased coronary artery perfusion  - Instruct patient to report change in severity of symptoms  Outcome: Progressing  Goal: Absence of cardiac dysrhythmias or at baseline rhythm  Description: INTERVENTIONS:  - Continuous cardiac monitoring, vital signs, obtain 12 lead EKG if ordered  - Administer antiarrhythmic and heart rate control medications as ordered  - Monitor electrolytes and administer replacement therapy as ordered  Outcome: Progressing     Problem: RESPIRATORY - ADULT  Goal: Achieves optimal ventilation and oxygenation  Description: INTERVENTIONS:  - Assess for changes in respiratory status  - Assess for changes in mentation and behavior  - Position to facilitate oxygenation  and minimize respiratory effort  - Oxygen administered by appropriate delivery if ordered  - Initiate smoking cessation education as indicated  - Encourage broncho-pulmonary hygiene including cough, deep breathe, Incentive Spirometry  - Assess the need for suctioning and aspirate as needed  - Assess and instruct to report SOB or any respiratory difficulty  - Respiratory Therapy support as indicated  Outcome: Progressing     Problem: Prexisting or High Potential for Compromised Skin Integrity  Goal: Skin integrity is maintained or improved  Description: INTERVENTIONS:  - Identify patients at risk for skin breakdown  - Assess and monitor skin integrity  - Assess and monitor nutrition and hydration status  - Monitor labs   - Assess for incontinence   - Turn and reposition patient  - Assist with mobility/ambulation  - Relieve pressure over bony prominences  - Avoid friction and shearing  - Provide appropriate hygiene as needed including keeping skin clean and dry  - Evaluate need for skin moisturizer/barrier cream  - Collaborate with interdisciplinary team   - Patient/family teaching  - Consider wound care consult   Outcome: Progressing

## 2024-11-23 NOTE — ASSESSMENT & PLAN NOTE
Complained of chest pain with inspiration  Obtained EKG- without signs of acute ischemia, Trop was 22  Chest x-ray: without pneumonia, effusion, pneumothorax  Patient with risk factors for PE of immobility, recent fracture -PE ruled out with CTA chest  Noted heavy atherosclerotic coronary artery calcification  Last cath was in 2023 with known disease that was treated with medical management  Discussed with cardiology and ongoing medical management recommended

## 2024-11-23 NOTE — ASSESSMENT & PLAN NOTE
S/p recent fall at [facility]  CT left knee showing what appears to be a fracture of her bipartite patella   Seen by orthopedic team  Knee immobilizer x 2 weeks with WBAT to LLE   Office follow-up in 2 weeks with repeat x-rays   PT OT recommending moderate resource intensity-level 2

## 2024-11-23 NOTE — ASSESSMENT & PLAN NOTE
-Corrected sodium for blood sugar is only borderline low.  Monitor for now.  See discussion above.

## 2024-11-23 NOTE — PROGRESS NOTES
Progress Note - Hospitalist   Name: Mattie Varela 79 y.o. female I MRN: [id number]  Unit/Bed#: E4 -01 I Date of Admission: 11/17/2024   Date of Service: 11/23/2024 I Hospital Day: 6    Assessment & Plan  Acute on chronic diastolic congestive heart failure (HCC)  Wt Readings from Last 3 Encounters:   11/23/24 80.1 kg (176 lb 9.4 oz)   10/18/24 74.3 kg (163 lb 12.8 oz)   10/02/24 85.5 kg (188 lb 9.6 oz)   79 year old female presented with shortness of breath, lower extremity edema, volume overload, and weight gain secondary to acute on chronic diastolic congestive heart failure.  Echo was updated-EF 51%, systolic function low normal, septic akinetic to paradoxical, endocardial detection was significantly limited.  Unable to assess diastolic function  Takes torsemide 30 mg daily  Received IV lasix 40 mg bid --> increased to 60 mg BID  On metoprolol 50 mg twice daily  Down about 6.3 L, admission weight 194 pounds, current weight stable at 176 pounds  Cardiology price checking [facility]-$25 per day-unable to afford  Volume status much improved and cardiology would like to transition to oral diuretics however now holding diuretics given rise in Cr  Continue to monitor volume status closely  Colitis  Had large bowel movement 11/18 and has had frequent bouts of stool after this  C. difficile and enteric pathogen panel negative  COVID, flu, RSV negative  Had abdominal pain and poor oral intake  CT abdomen reveals diffuse colonic wall thickening and mucosal hyperenhancement compatible with colitis most prominently involving the descending and sigmoid colon.  Colonic fluid compatible with scattered colonic diverticuli.  Started on IV anbiotics with ceftriaxone and metronidazole  Plan for a 5-day course  Diarrhea subsiding  Chest pain  Complained of chest pain with inspiration  Obtained EKG- without signs of acute ischemia, Trop was 22  Chest x-ray: without pneumonia, effusion, pneumothorax  Patient with risk factors  for PE of immobility, recent fracture -PE ruled out with CTA chest  Noted heavy atherosclerotic coronary artery calcification  Last cath was in 2023 with known disease that was treated with medical management  Discussed with cardiology and ongoing medical management recommended  Patella fracture  S/p recent fall at [facility]  CT left knee showing what appears to be a fracture of her bipartite patella   Seen by orthopedic team  Knee immobilizer x 2 weeks with WBAT to LLE   Office follow-up in 2 weeks with repeat x-rays   PT OT recommending moderate resource intensity-level 2  Stage 3a chronic kidney disease (HCC)  Results from last 7 days   Lab Units 11/23/24  0304 11/22/24  0351 11/21/24  0929 11/20/24  1135   CREATININE mg/dL 3.10* 1.78* 1.48* 1.36*   Baseline around 1.3   Noted SADAF on CKD now  Cr rise likely secondary to recent diuresis and recent dye load   Received some pre and post scan fluids  Holding nephrotoxins -diuretics   Switched DVT prophylaxis from Lovenox to heparin  Consult nephrology at this time  Hypokalemia  Results from last 7 days   Lab Units 11/23/24  0304 11/22/24  0351 11/21/24  0929 11/20/24  1135   POTASSIUM mmol/L 3.2* 3.0* 3.7 3.7   Likely secondary to recent diuresis, diarrhea, and poor oral intake  Repleting with oral K  Magnesium low at 1.7-repleted  Recheck in a.m.  Type 2 diabetes mellitus with other skin complications (HCC)  Lab Results   Component Value Date    HGBA1C 7.4 (H) 10/12/2024     Recent Labs     11/22/24  1614 11/22/24  2156 11/23/24  0742 11/23/24  1109   POCGLU 290* 267* 193* 324*   Holding Metformin  Continue insulin sliding scale  We will add Premeal insulin at 3 units 3 times daily with meals  Urinary retention  Had overactive bladder in the past and received Botox.   Continues to have urinary retention requiring ahn catheter placement. For outpatient voiding trial.  Void trial should occur with urology in the outpt setting  UA >100,000  cfu GNR. Was recently treated  for Klebsiella cystitis  Without symptoms, fever or leukocytosis & chronic ahn, monitor off abx  Chronic pain disorder  History of chronic pain with degenerative spine and history of spinal stimulator in place (although does not work)  On pain medication in the outpatient setting  PDMP queried- no red flags  Has been prescribed oxycodone/acetaminophen 10/325 tabs and oxycodone 10 mg tabs  Current regimen oxycodone 5 mg prn for moderate pain & oxycodone 10 mg for severe pain  Added dilaudid for breakthrough pain 11/22  Patient complaining of persistent pain-discussed recent CT imaging in regards to spine  Already has Lidoderm patches in place and on scheduled Robaxin  Switch muscle relaxer to Flexeril and DC Robaxin  Schedule Tylenol 975 mg 3 times daily  Trial dose of valium today to see if this helps  Reached out to orthopedics for additional input/evaluation  Ongoing PT/OT  Could consider steroids but will defer to orthopedics  Hypertension  Home regimen metoprolol succinate 50 mg twice daily  BP stable  Hypothyroidism  Continue levothyroxine  Insomnia  Was placed on melatonin however reports this is ineffective  Requested additional sleep aid  Has trialed Ambien in the past and this worked well for her    VTE Pharmacologic Prophylaxis:       Mobility:   Basic Mobility Inpatient Raw Score: 8  -[facility] Goal: 3: Sit at edge of bed  -[facility] Achieved: 1: Laying in bed    Patient Centered Rounds: I performed bedside rounds with nursing staff today.   Discussions with Specialists or Other Care Team Provider: Dr. Krueger, nephrology, orthopedics, nursing    Education and Discussions with Family / Patient: patient, called daughter via telephone- left voicemail    Current Length of Stay: 6 day(s)  Current Patient Status: Inpatient   Certification Statement: With need for continued inpatient stay for ongoing treatment of SADAF and back pain    Code Status: Level 1 - Full Code    Subjective   Resting in bed.  Patient again  complaining of persistent pain in chest when takes a deep breath in along with back pain.  Reviewed current imaging with patient and current pain regimen. Noted rise in kidney function. Left message for daughter via telephone.    Objective :  Temp:  [97.5 °F (36.4 °C)-97.9 °F (36.6 °C)] 97.5 °F (36.4 °C)  HR:  [] 101  BP: (100-148)/(59-69) 148/65  Resp:  [18] 18  SpO2:  [94 %-100 %] 97 %  O2 Device: None (Room air)  Nasal Cannula O2 Flow Rate (L/min):  [3 L/min] 3 L/min    Body mass index is 34.49 kg/m².     Input and Output Summary (last 24 hours):     Intake/Output Summary (Last 24 hours) at 11/23/2024 1122  Last data filed at 11/22/2024 1900  Gross per 24 hour   Intake 720 ml   Output 275 ml   Net 445 ml       Physical Exam  Vitals and nursing note reviewed.   Constitutional:       General: She is not in acute distress.     Appearance: She is obese. She is not toxic-appearing.   HENT:      Head: Normocephalic and atraumatic.   Eyes:      General: No scleral icterus.  Cardiovascular:      Rate and Rhythm: Normal rate and regular rhythm.      Heart sounds: Murmur heard.   Pulmonary:      Effort: Pulmonary effort is normal. No respiratory distress.      Breath sounds: Normal breath sounds. No stridor. No wheezing or rhonchi.   Abdominal:      General: Bowel sounds are normal. There is no distension.      Palpations: Abdomen is soft. There is no mass.      Tenderness: There is no abdominal tenderness.      Hernia: No hernia is present.   Musculoskeletal:         General: No swelling.      Cervical back: Neck supple.   Skin:     General: Skin is warm and dry.   Neurological:      Mental Status: She is alert and oriented to person, place, and time. Mental status is at baseline.   Psychiatric:         Mood and Affect: Mood normal.         Behavior: Behavior normal.       Lines/Drains:  Lines/Drains/Airways       Active Status       Name Placement date Placement time Site Days    Urethral Catheter 16 Fr. 10/26/24   1311  --  27                  Urinary Catheter:  Goal for removal: N/A - Chronic [place]           Telemetry:  Telemetry Orders (From admission, onward)               24 Hour Telemetry Monitoring  Continuous x 24 Hours (Telem)        Question:  Reason for 24 Hour Telemetry  Answer:  Decompensated CHF- and any one of the following: continuous diuretic infusion or total diuretic dose >200 mg daily, associated electrolyte derangement (I.e. K < 3.0), ionotropic drip (continuous infusion), hx of ventricular arrhythmia, or new EF < 35%                                  Lab Results: I have reviewed the following results:   Results from last 7 days   Lab Units 11/23/24  0305 11/21/24  0929 11/19/24  0551   WBC Thousand/uL 8.37   < > 9.02   HEMOGLOBIN g/dL 9.0*   < > 10.7*   HEMATOCRIT % 27.8*   < > 33.6*   PLATELETS Thousands/uL 227   < > 253   SEGS PCT %  --   --  65   LYMPHO PCT %  --   --  20   MONO PCT %  --   --  12   EOS PCT %  --   --  3    < > = values in this interval not displayed.     Results from last 7 days   Lab Units 11/23/24  0304   SODIUM mmol/L 134*   POTASSIUM mmol/L 3.2*   CHLORIDE mmol/L 97   CO2 mmol/L 27   BUN mg/dL 38*   CREATININE mg/dL 3.10*   ANION GAP mmol/L 10   CALCIUM mg/dL 8.8   ALBUMIN g/dL 2.8*   TOTAL BILIRUBIN mg/dL 0.28   ALK PHOS U/L 74   ALT U/L 5*   AST U/L 12*   GLUCOSE RANDOM mg/dL 192*         Results from last 7 days   Lab Units 11/23/24  1109 11/23/24  0742 11/22/24  2156 11/22/24  1614 11/22/24  1100 11/22/24  0957 11/22/24  0740 11/22/24  0106 11/21/24  2052 11/21/24  1219 11/21/24  0758 11/20/24  2110   POC GLUCOSE mg/dl 324* 193* 267* 290* 260* 222* 99 152* 292* 162* 127 176*               Recent Cultures (last 7 days):   Results from last 7 days   Lab Units 11/19/24  0011 11/17/24  1002   URINE CULTURE   --  >100,000 cfu/ml   C DIFF TOXIN B BY PCR  Negative  --              Last 24 Hours Medication List:     Current Facility-Administered Medications:     acetaminophen (TYLENOL)  tablet 975 mg, Q8H ARY    albuterol (PROVENTIL HFA,VENTOLIN HFA) inhaler 2 puff, Q6H PRN    aluminum-magnesium hydroxide-simethicone (MAALOX) oral suspension 30 mL, Q4H PRN    aspirin chewable tablet 81 mg, Daily    atorvastatin (LIPITOR) tablet 40 mg, Daily With Dinner    baclofen tablet 10 mg, BID    ciprofloxacin (CIPRO) IVPB (premix in 5% dextrose) 400 mg 200 mL, Q24H, Last Rate: 400 mg (11/22/24 1835)    diazepam (VALIUM) tablet 2 mg, Once    gabapentin (NEURONTIN) capsule 100 mg, HS    guaiFENesin (MUCINEX) 12 hr tablet 600 mg, Q12H ARY    heparin (porcine) subcutaneous injection 5,000 Units, Q8H ARY    HYDROmorphone HCl (DILAUDID) injection 0.2 mg, Q6H PRN    insulin lispro (HumALOG/ADMELOG) 100 units/mL subcutaneous injection 1-6 Units, 4x Daily (AC & HS) **AND** Fingerstick Glucose (POCT), 4x Daily AC and at bedtime    latanoprost (XALATAN) 0.005 % ophthalmic solution 1 drop, HS    levalbuterol (XOPENEX) inhalation solution 1.25 mg, TID    levothyroxine tablet 37.5 mcg, Early Morning    lidocaine (LIDODERM) 5 % patch 1 patch, Daily    loperamide (IMODIUM) capsule 2 mg, 4x Daily PRN    meclizine (ANTIVERT) tablet 12.5 mg, Daily PRN    melatonin tablet 3 mg, HS PRN    metoprolol succinate (TOPROL-XL) 24 hr tablet 50 mg, Q12H ARY    metroNIDAZOLE (FLAGYL) IVPB (premix) 500 mg 100 mL, Q8H, Last Rate: 500 mg (11/23/24 1111)    Milnacipran HCl TABS 100 mg, Daily    multi-electrolyte (PLASMALYTE-A/ISOLYTE-S PH 7.4) IV solution, Continuous, Last Rate: 75 mL/hr (11/23/24 0526)    oxyCODONE (ROXICODONE) immediate release tablet 10 mg, Q6H PRN    oxyCODONE (ROXICODONE) IR tablet 5 mg, Q6H PRN    pantoprazole (PROTONIX) EC tablet 40 mg, Early Morning    sucralfate (CARAFATE) tablet 1 g, BID AC    [Held by provider] torsemide (DEMADEX) tablet 30 mg, BID    zolpidem (AMBIEN) tablet 5 mg, HS PRN    Administrative Statements   Today, Patient Was Seen By: Susannah Mcclain PA-C      **Please Note: This note may have been  constructed using a voice recognition system.**

## 2024-11-23 NOTE — ASSESSMENT & PLAN NOTE
History of chronic pain with degenerative spine and history of spinal stimulator in place (although does not work)  On pain medication in the outpatient setting  PDMP queried- no red flags  Has been prescribed oxycodone/acetaminophen 10/325 tabs and oxycodone 10 mg tabs  Current regimen oxycodone 5 mg prn for moderate pain & oxycodone 10 mg for severe pain  Added dilaudid for breakthrough pain 11/22  Patient complaining of persistent pain-discussed recent CT imaging in regards to spine  Already has Lidoderm patches in place and on scheduled Robaxin  Switch muscle relaxer to Flexeril and DC Robaxin  Schedule Tylenol 975 mg 3 times daily  Trial dose of valium today to see if this helps  Reached out to orthopedics for additional input/evaluation  Ongoing PT/OT  Could consider steroids but will defer to orthopedics

## 2024-11-23 NOTE — NURSING NOTE
Patient refusing to turn on side for skin assessment due to pain during movement. Positioned pillows and wedges for support.     Teresa Sanabria BSN, RN
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
## 2024-11-24 PROBLEM — B96.1 KLEBSIELLA CYSTITIS: Status: RESOLVED | Noted: 2024-10-25 | Resolved: 2024-11-24

## 2024-11-24 PROBLEM — N30.90 KLEBSIELLA CYSTITIS: Status: RESOLVED | Noted: 2024-10-25 | Resolved: 2024-11-24

## 2024-11-24 LAB
ALBUMIN SERPL BCG-MCNC: 3 G/DL (ref 3.5–5)
ALP SERPL-CCNC: 76 U/L (ref 34–104)
ALT SERPL W P-5'-P-CCNC: 5 U/L (ref 7–52)
ANION GAP SERPL CALCULATED.3IONS-SCNC: 8 MMOL/L (ref 4–13)
AST SERPL W P-5'-P-CCNC: 13 U/L (ref 13–39)
BILIRUB SERPL-MCNC: 0.3 MG/DL (ref 0.2–1)
BUN SERPL-MCNC: 44 MG/DL (ref 5–25)
CALCIUM ALBUM COR SERPL-MCNC: 10.1 MG/DL (ref 8.3–10.1)
CALCIUM SERPL-MCNC: 9.3 MG/DL (ref 8.4–10.2)
CHLORIDE SERPL-SCNC: 99 MMOL/L (ref 96–108)
CO2 SERPL-SCNC: 26 MMOL/L (ref 21–32)
CREAT SERPL-MCNC: 3.4 MG/DL (ref 0.6–1.3)
ERYTHROCYTE [DISTWIDTH] IN BLOOD BY AUTOMATED COUNT: 14.4 % (ref 11.6–15.1)
GFR SERPL CREATININE-BSD FRML MDRD: 12 ML/MIN/1.73SQ M
GLUCOSE SERPL-MCNC: 133 MG/DL (ref 65–140)
GLUCOSE SERPL-MCNC: 136 MG/DL (ref 65–140)
GLUCOSE SERPL-MCNC: 136 MG/DL (ref 65–140)
GLUCOSE SERPL-MCNC: 156 MG/DL (ref 65–140)
GLUCOSE SERPL-MCNC: 182 MG/DL (ref 65–140)
HCT VFR BLD AUTO: 30.8 % (ref 34.8–46.1)
HGB BLD-MCNC: 9.5 G/DL (ref 11.5–15.4)
MCH RBC QN AUTO: 28.2 PG (ref 26.8–34.3)
MCHC RBC AUTO-ENTMCNC: 30.8 G/DL (ref 31.4–37.4)
MCV RBC AUTO: 91 FL (ref 82–98)
PLATELET # BLD AUTO: 268 THOUSANDS/UL (ref 149–390)
PMV BLD AUTO: 10.3 FL (ref 8.9–12.7)
POTASSIUM SERPL-SCNC: 4 MMOL/L (ref 3.5–5.3)
PROT SERPL-MCNC: 6.1 G/DL (ref 6.4–8.4)
RBC # BLD AUTO: 3.37 MILLION/UL (ref 3.81–5.12)
SODIUM SERPL-SCNC: 133 MMOL/L (ref 135–147)
WBC # BLD AUTO: 7.16 THOUSAND/UL (ref 4.31–10.16)

## 2024-11-24 PROCEDURE — 82948 REAGENT STRIP/BLOOD GLUCOSE: CPT

## 2024-11-24 PROCEDURE — 99233 SBSQ HOSP IP/OBS HIGH 50: CPT | Performed by: PHYSICIAN ASSISTANT

## 2024-11-24 PROCEDURE — 85027 COMPLETE CBC AUTOMATED: CPT | Performed by: PHYSICIAN ASSISTANT

## 2024-11-24 PROCEDURE — 99232 SBSQ HOSP IP/OBS MODERATE 35: CPT | Performed by: INTERNAL MEDICINE

## 2024-11-24 PROCEDURE — 80053 COMPREHEN METABOLIC PANEL: CPT | Performed by: PHYSICIAN ASSISTANT

## 2024-11-24 PROCEDURE — 99024 POSTOP FOLLOW-UP VISIT: CPT | Performed by: ORTHOPAEDIC SURGERY

## 2024-11-24 PROCEDURE — NC001 PR NO CHARGE: Performed by: ORTHOPAEDIC SURGERY

## 2024-11-24 RX ORDER — CIPROFLOXACIN 2 MG/ML
400 INJECTION, SOLUTION INTRAVENOUS EVERY 24 HOURS
Status: DISCONTINUED | OUTPATIENT
Start: 2024-11-24 | End: 2024-11-25

## 2024-11-24 RX ORDER — ECHINACEA PURPUREA EXTRACT 125 MG
2 TABLET ORAL 2 TIMES DAILY
Status: DISCONTINUED | OUTPATIENT
Start: 2024-11-24 | End: 2024-11-26

## 2024-11-24 RX ORDER — METOPROLOL SUCCINATE 25 MG/1
12.5 TABLET, EXTENDED RELEASE ORAL EVERY 12 HOURS SCHEDULED
Status: DISCONTINUED | OUTPATIENT
Start: 2024-11-24 | End: 2024-11-24

## 2024-11-24 RX ORDER — LEVALBUTEROL INHALATION SOLUTION 1.25 MG/3ML
1.25 SOLUTION RESPIRATORY (INHALATION) EVERY 6 HOURS PRN
Status: DISCONTINUED | OUTPATIENT
Start: 2024-11-24 | End: 2024-12-02 | Stop reason: HOSPADM

## 2024-11-24 RX ORDER — ALBUMIN (HUMAN) 12.5 G/50ML
12.5 SOLUTION INTRAVENOUS ONCE
Status: COMPLETED | OUTPATIENT
Start: 2024-11-24 | End: 2024-11-24

## 2024-11-24 RX ORDER — METOPROLOL SUCCINATE 25 MG/1
12.5 TABLET, EXTENDED RELEASE ORAL DAILY
Status: DISCONTINUED | OUTPATIENT
Start: 2024-11-25 | End: 2024-12-02 | Stop reason: HOSPADM

## 2024-11-24 RX ADMIN — ALBUMIN (HUMAN) 12.5 G: 0.25 INJECTION, SOLUTION INTRAVENOUS at 11:29

## 2024-11-24 RX ADMIN — HEPARIN SODIUM 5000 UNITS: 5000 INJECTION INTRAVENOUS; SUBCUTANEOUS at 05:55

## 2024-11-24 RX ADMIN — GUAIFENESIN 600 MG: 600 TABLET, EXTENDED RELEASE ORAL at 09:19

## 2024-11-24 RX ADMIN — GUAIFENESIN 600 MG: 600 TABLET, EXTENDED RELEASE ORAL at 21:09

## 2024-11-24 RX ADMIN — METRONIDAZOLE 500 MG: 5 INJECTION, SOLUTION INTRAVENOUS at 19:46

## 2024-11-24 RX ADMIN — OXYCODONE HYDROCHLORIDE 10 MG: 10 TABLET ORAL at 19:42

## 2024-11-24 RX ADMIN — HEPARIN SODIUM 5000 UNITS: 5000 INJECTION INTRAVENOUS; SUBCUTANEOUS at 16:57

## 2024-11-24 RX ADMIN — ACETAMINOPHEN 975 MG: 325 TABLET, FILM COATED ORAL at 16:57

## 2024-11-24 RX ADMIN — INSULIN LISPRO 1 UNITS: 100 INJECTION, SOLUTION INTRAVENOUS; SUBCUTANEOUS at 11:51

## 2024-11-24 RX ADMIN — HEPARIN SODIUM 5000 UNITS: 5000 INJECTION INTRAVENOUS; SUBCUTANEOUS at 21:06

## 2024-11-24 RX ADMIN — CIPROFLOXACIN 400 MG: 400 INJECTION, SOLUTION INTRAVENOUS at 20:58

## 2024-11-24 RX ADMIN — SUCRALFATE 1 G: 1 TABLET ORAL at 05:56

## 2024-11-24 RX ADMIN — GABAPENTIN 100 MG: 100 CAPSULE ORAL at 21:09

## 2024-11-24 RX ADMIN — SUCRALFATE 1 G: 1 TABLET ORAL at 16:57

## 2024-11-24 RX ADMIN — ALBUTEROL SULFATE 2 PUFF: 90 AEROSOL, METERED RESPIRATORY (INHALATION) at 19:43

## 2024-11-24 RX ADMIN — SALINE NASAL SPRAY 2 SPRAY: 1.5 SOLUTION NASAL at 21:17

## 2024-11-24 RX ADMIN — ATORVASTATIN CALCIUM 40 MG: 40 TABLET, FILM COATED ORAL at 16:57

## 2024-11-24 RX ADMIN — ACETAMINOPHEN 975 MG: 325 TABLET, FILM COATED ORAL at 21:06

## 2024-11-24 RX ADMIN — LEVOTHYROXINE SODIUM 37.5 MCG: 75 TABLET ORAL at 05:56

## 2024-11-24 RX ADMIN — ACETAMINOPHEN 975 MG: 325 TABLET, FILM COATED ORAL at 05:56

## 2024-11-24 RX ADMIN — METRONIDAZOLE 500 MG: 5 INJECTION, SOLUTION INTRAVENOUS at 03:30

## 2024-11-24 RX ADMIN — OXYCODONE HYDROCHLORIDE 10 MG: 10 TABLET ORAL at 09:19

## 2024-11-24 RX ADMIN — INSULIN LISPRO 1 UNITS: 100 INJECTION, SOLUTION INTRAVENOUS; SUBCUTANEOUS at 21:06

## 2024-11-24 RX ADMIN — PANTOPRAZOLE SODIUM 40 MG: 40 TABLET, DELAYED RELEASE ORAL at 05:56

## 2024-11-24 RX ADMIN — ASPIRIN 81 MG CHEWABLE TABLET 81 MG: 81 TABLET CHEWABLE at 09:19

## 2024-11-24 RX ADMIN — METRONIDAZOLE 500 MG: 5 INJECTION, SOLUTION INTRAVENOUS at 11:41

## 2024-11-24 RX ADMIN — HYDROMORPHONE HYDROCHLORIDE 0.2 MG: 0.2 INJECTION, SOLUTION INTRAMUSCULAR; INTRAVENOUS; SUBCUTANEOUS at 21:07

## 2024-11-24 NOTE — ASSESSMENT & PLAN NOTE
- follows with outpatient nephrology  - baseline creatinine ~1.3-1.4  - creatine continues to elevate, now 3.40  - nephrology following

## 2024-11-24 NOTE — ASSESSMENT & PLAN NOTE
History of chronic pain with degenerative spine and history of spinal stimulator in place (although does not work)  On pain medication in the outpatient setting  PDMP queried- no red flags  Has been prescribed oxycodone/acetaminophen 10/325 tabs and oxycodone 10 mg tabs  Current regimen oxycodone 5 mg prn for moderate pain & oxycodone 10 mg for severe pain  Added dilaudid for breakthrough pain 11/22  Patient complaining of persistent pain-discussed recent CT imaging in regards to spine  Already has Lidoderm patches in place and on scheduled Robaxin  Discontinue all muscle relaxers in the setting of SADAF  Scheduled Tylenol 975 mg 3 times daily  Trial dose of valium however with over sedate of affect-avoid further doses of Valium  Reached out to orthopedics for additional input/evaluation  Orthopedics consulted in regards to back pain  Consulted palliative care for further guidance and pain regimen/control

## 2024-11-24 NOTE — PROGRESS NOTES
Progress Note - Orthopedics   Name: Mattie Varela 79 y.o. female I MRN: [id number]  Unit/Bed#: E4 -01 I Date of Admission: 11/17/2024   Date of Service: 11/24/2024 I Hospital Day: 7    Assessment & Plan  Patella fracture  - CT of the Left knee reviewed today showing what appears to be a fracture of her bipartite patella  - Continue with KI.  - WBAT LLE with knee immobilizer on  - PT/OT  - Pain control PRN  - Medical management per primary  - Follow up outpatient in 2 weeks with Dr. Velasco in the office with repeat xrays.  Acute on chronic diastolic congestive heart failure (HCC)  Wt Readings from Last 3 Encounters:   11/24/24 82.2 kg (181 lb 3.5 oz)   10/18/24 74.3 kg (163 lb 12.8 oz)   10/02/24 85.5 kg (188 lb 9.6 oz)     -Management per primary      Type 2 diabetes mellitus with other skin complications ([facility])  Lab Results   Component Value Date    HGBA1C 7.4 (H) 10/12/2024       Recent Labs     11/23/24  1109 11/23/24  1622 11/23/24  2127 11/24/24  0758   POCGLU 324* 197* 236* 133       Blood Sugar Average: Last 72 hrs:  (P) 211    -Management per primary    Stage 3a chronic kidney disease ([facility])  Lab Results   Component Value Date    EGFR 12 11/24/2024    EGFR 13 11/23/2024    EGFR 13 11/23/2024    CREATININE 3.40 (H) 11/24/2024    CREATININE 3.14 (H) 11/23/2024    CREATININE 3.10 (H) 11/23/2024     SADAF (acute kidney injury) ([facility])    Chest pain    Colitis    Insomnia    Hypokalemia    At risk for acid-base imbalance    Electrolyte imbalance risk    Degenerative lumbar spinal stenosis    CT L-spine from 11/15/24 shows no acute fractures.  Evaluation of stenosis is limited by hardware from prior fusion  In absence of focal neulologic deficit, no further imaging is indicated  Continue medical pain management  Activity as tolerated  If symptoms worsen or if focal neurologic deficit develops, would consider formal spine consult  F/U with ortho spine or neurosurgery for outpatient management        Subjective  "  79 y.o.female known to ortho this admission for left patella fracture vs bipartitie patella.  We are re-consulted for acute on chronic back pain.  Her pain begins in the upper lumbar region and radiates to both legs.  She has history of lumbar fusion and failed back syndrome with chronic pain.  Her back and leg pain have worsened over the past few weeks.  She denies numbness in BLE.  Denies fevers, chills, CP, SOB, N/V, numbness or tingling. Patient reports no issues with urination or bowel movements.    Objective :  Temp:  [96.9 °F (36.1 °C)-98.2 °F (36.8 °C)] 96.9 °F (36.1 °C)  HR:  [52-94] 52  BP: ()/(41-64) 100/49  Resp:  [18] 18  SpO2:  [94 %-97 %] 97 %  O2 Device: None (Room air)    Physical Exam  Musculoskeletal:  Lumbar Spine and Bilateral Lower Extremities  Skin warm and dry . No erythema or ecchymosis.  TTP - diffuse lumbar paraspinal tenderness  Sensation intact from L1-S1 BLE  Motor exam is limited due to diffuse pain and left patella fracture  4/5 hip flexors BLE, 4/5 quad RLE / not assessed LLE, 4/5 HS RLE / not assessed LLE, 4/5 AT BLE, 4/5 GSC BLE, 4/5 EHL BLE  No obvious focal neurologic deficits BLE  No hyperreflexia BLE  2+ DP pulse      Lab Results: I have reviewed the following results:  Recent Labs     11/21/24  0929 11/22/24  0351 11/23/24  0304 11/23/24  0305 11/23/24  1132 11/24/24  0618   WBC 9.95 9.62  --  8.37  --  7.16   HGB 11.4* 10.2*  --  9.0*  --  9.5*   HCT 36.3 31.3*  --  27.8*  --  30.8*    275  --  227  --  268   BUN 24 31* 38*  --  40* 44*   CREATININE 1.48* 1.78* 3.10*  --  3.14* 3.40*   .2*  --   --   --   --   --      Blood Culture:    Lab Results   Component Value Date    BLOODCX No Growth After 5 Days. 01/13/2024     Wound Culture: No results found for: \"WOUNDCULT\"  "

## 2024-11-24 NOTE — PROGRESS NOTES
Progress Note - Nephrology   Name: Mattie Varela 79 y.o. female I MRN: [id number]  Unit/Bed#: E4 -01 I Date of Admission: 11/17/2024   Date of Service: 11/24/2024 I Hospital Day: 7    Assessment & Plan  SADAF (acute kidney injury) (HCC)  -Initially there was concern for decompensated heart failure.  Was started on IV diuretics.  Cardiology team was on board.  Diuretics were increased to 60 mg twice daily of IV Lasix.  Patient was appropriately losing weight.  - Also had frequent stool output 11/19  - Weight was improving on bed scale 276 pounds on 11/21, creatinine slightly trending higher 1.48 on 11/21.  Diuretics were held on 11/21 due to slightly rising creatinine and patient needing CT scan with contrast due to complaint of chest pain and risk of PE therefore needed further evaluation per review of notes  - CT imaging with kidneys unremarkable no hydronephrosis  - Urinalysis 11/17 with innumerable WBC  - Overnight 11/22 until 11/23-was noted to be tachycardic.  Was given low-dose intravenous fluids.  - Last dose of IV Lasix was on 11/20 and subsequently received 1 dose of torsemide on 11/21.  Then briefly received IV fluids pre-CT scan.  And then received brief IV fluids on 11/23    Overall, baseline creatinine appears to be low 1 mg/dL, but had acute kidney injury in October admission to 1.7 mg/dL which improved.  Subsequently creatinine remained around 1.2 to 1.3 mg/dL and on this admission 11/17 was 1.2 mg/dL rising to 3.1 mg/dL on 11/23 prompting nephrology consultation.    Etiology-likely multifactorial in the setting of diuresis/possible intravascular depletion/contrast on 11/21 leading to SADAF.  Now with mild hyponatremia developing likely due to lack of free water clearance but when corrected for hyperglycemia, sodium level is only borderline low.  Potassium has been repleted and is appropriate 4.1.  Bicarbonate appropriate..  Patient is developing oliguria.    Course of stay since renal team  consulted  - 11/23-we gave intravenous fluids low-dose and maintain diuretics on hold.  - 11/24-creatinine improving but rate of rise is slowing to 3.4 mg/dL from 3.1 mg/dL.  Patient appears adequately volume expanded.  Still remains slightly oliguric though currently had over 300 cc in the [facility].  Therefore will hold intravenous fluids today as doing and repeat lab work in a.m.  Contributing to the rise is also likely hypotension last night systolic.    Plan  - Hold fluids and diuretics today.  - Give one-time albumin today for supportive measures.  - I/os; avoid nephrotoxic agents  - Avoid hypotension  - BMP in a.m.  - To note patient has slight tremors/jerking like movements of arms.  She tells me that this is a chronic issue and not new.  Therefore at this juncture, I do not believe this is uremic related.  She has no other signs of uremia at this juncture.  At risk for acid-base imbalance  -Monitor for now and appropriate  Electrolyte imbalance risk  - Sodium level okay for now 133.  Slightly low due to lack of free water clearance in the setting of SADAF and tubular dysfunction.  Acute on chronic diastolic congestive heart failure (HCC)  -History of diastolic heart failure  - Echocardiogram in May with EF 70%, grade 1 diastolic dysfunction, mild tricuspid regurgitation  - Cardiology on board  - Initially was started on IV diuretics and diuretics were increased on 11/18 by cardiology team.  Subsequently diuretics were held due to rising creatinine.  Chronic pain disorder  -Per primary team  Hypertension  -Avoid hypotension.  On beta-blocker.  Hypothyroidism  -Per primary team  Urinary retention  -History of urinary retention, spine stimulator in place  - Bateman catheter care per primary team  - Was recently treated in October for Klebsiella cystitis  Chest pain  -Per cardiology and primary team  - No PE on CT scan  Patella fracture  -Left knee lateral facet patellar fracture-knee immobilizer recommended by  orthopedic team  Colitis  -CT scan with concerning for colitis  - Started on antibiotics by primary team  Hypokalemia  -Resolved and potassium is stable at 4.    I have reviewed the nephrology recommendations including albumin, with primary team attending, and we are in agreement with renal plan including the information outlined above.     Subjective   Brief History of Admission - 79 y.o. female with a past medical history of CKD stage III, CHF, hypothyroidism, hypertension, diabetes, chronic pain, recent admission for Klebsiella cystitis, who was admitted to [facility] on 11/17 after presenting with worsening shortness of breath and edema. A renal consultation is requested today for assistance in the management of acute kidney injury.     Blood pressures less than 80 systolic.  This morning relatively stable at baseline 100 systolic.  On room air.  Weight is bed scale is slightly higher.  Remains oliguric.    Objective :  Temp:  [96.9 °F (36.1 °C)-98.2 °F (36.8 °C)] 96.9 °F (36.1 °C)  HR:  [52-94] 52  BP: ()/(41-64) 100/49  Resp:  [18] 18  SpO2:  [94 %-97 %] 97 %  O2 Device: None (Room air)    Current Weight: Weight - Scale: 82.2 kg (181 lb 3.5 oz)  First Weight: Weight - Scale: 88.4 kg (194 lb 14.2 oz)  I/O         11/22 0701  11/23 0700 11/23 0701  11/24 0700 11/24 0701  11/25 0700    P.O. 960 355     Total Intake(mL/kg) 960 (12) 355 (4.3)     Urine (mL/kg/hr) 275 (0.1) 300 (0.2)     Total Output 275 300     Net +685 +55                  Physical Exam  General: NAD  Skin: no rash  Eyes: anicteric sclera  ENT: moist mucous membrane  Neck: supple  Chest: Diminished intake bases but no wheezes or rhonchi throughout  CVS: s1s2, no murmur, no gallop, no rub  Abdomen: soft, nontender, nl sounds  Extremities: no edema LE b/l  : Positive with dark yellow urine anh  Neuro: AAOX3  Psych: normal affect    Medications:    Current Facility-Administered Medications:     acetaminophen (TYLENOL) tablet 975 mg, 975 mg,  Oral, Q8H ARY, Susannah Mcclain PA-C, 975 mg at 11/24/24 0556    albuterol (PROVENTIL HFA,VENTOLIN HFA) inhaler 2 puff, 2 puff, Inhalation, Q6H PRN, Paco Carmona MD    aluminum-magnesium hydroxide-simethicone (MAALOX) oral suspension 30 mL, 30 mL, Oral, Q4H PRN, Krystina Preston PA-C, 30 mL at 11/19/24 2159    aspirin chewable tablet 81 mg, 81 mg, Oral, Daily, Paco Carmona MD, 81 mg at 11/24/24 0919    atorvastatin (LIPITOR) tablet 40 mg, 40 mg, Oral, Daily With Dinner, Paco Carmona MD, 40 mg at 11/23/24 1705    ciprofloxacin (CIPRO) IVPB (premix in 5% dextrose) 400 mg 200 mL, 400 mg, Intravenous, Q24H, Perlita Torres MD, Last Rate: 200 mL/hr at 11/23/24 1907, 400 mg at 11/23/24 1907    gabapentin (NEURONTIN) capsule 100 mg, 100 mg, Oral, HS, Krystina Preston PA-C, 100 mg at 11/23/24 2134    guaiFENesin (MUCINEX) 12 hr tablet 600 mg, 600 mg, Oral, Q12H [facility], Bi Luke PA-C, 600 mg at 11/24/24 0919    heparin (porcine) subcutaneous injection 5,000 Units, 5,000 Units, Subcutaneous, Q8H ARY, Susannah Mcclain PA-C, 5,000 Units at 11/24/24 0555    HYDROmorphone HCl (DILAUDID) injection 0.2 mg, 0.2 mg, Intravenous, Q6H PRN, Susannah Mcclain PA-C, 0.2 mg at 11/23/24 1104    insulin lispro (HumALOG/ADMELOG) 100 units/mL subcutaneous injection 1-6 Units, 1-6 Units, Subcutaneous, 4x Daily (AC & HS), 3 Units at 11/23/24 2135 **AND** Fingerstick Glucose (POCT), , , 4x Daily AC and at bedtime, Renetta Mauricio, CRNP    latanoprost (XALATAN) 0.005 % ophthalmic solution 1 drop, 1 drop, Both Eyes, HS, Paco Carmona MD, 1 drop at 11/22/24 2125    levalbuterol (XOPENEX) inhalation solution 1.25 mg, 1.25 mg, Nebulization, TID, Susannah Mcclain PA-C, 1.25 mg at 11/23/24 1433    levothyroxine tablet 37.5 mcg, 37.5 mcg, Oral, Early Morning, Paco Carmona MD, 37.5 mcg at 11/24/24 0556    lidocaine (LIDODERM) 5 % patch 1 patch, 1 patch, Topical, Daily, Perlita Torres MD, 1 patch at 11/23/24 0903     loperamide (IMODIUM) capsule 2 mg, 2 mg, Oral, 4x Daily PRN, Perlita Torres MD, 2 mg at 11/22/24 0636    meclizine (ANTIVERT) tablet 12.5 mg, 12.5 mg, Oral, Daily PRN, Paco Carmona MD    melatonin tablet 3 mg, 3 mg, Oral, HS PRN, Bi Luke PA-C, 3 mg at 11/22/24 2139    metoprolol succinate (TOPROL-XL) 24 hr tablet 12.5 mg, 12.5 mg, Oral, Q12H ARY, Susannah Mcclain PA-C    metroNIDAZOLE (FLAGYL) IVPB (premix) 500 mg 100 mL, 500 mg, Intravenous, Q8H, Perlita Torres MD, Last Rate: 200 mL/hr at 11/24/24 0330, 500 mg at 11/24/24 0330    Milnacipran HCl TABS 100 mg, 1 tablet, Oral, Daily, Paco Carmona MD    oxyCODONE (ROXICODONE) immediate release tablet 10 mg, 10 mg, Oral, Q6H PRN, Krystina Preston PA-C, 10 mg at 11/24/24 0919    oxyCODONE (ROXICODONE) IR tablet 5 mg, 5 mg, Oral, Q6H PRN, Krystina Preston PA-C, 5 mg at 11/22/24 0640    pantoprazole (PROTONIX) EC tablet 40 mg, 40 mg, Oral, Early Morning, Paco Carmona MD, 40 mg at 11/24/24 0556    sodium chloride (OCEAN) 0.65 % nasal spray 2 spray, 2 spray, Each Nare, BID, Susannah Mcclain PA-C    sucralfate (CARAFATE) tablet 1 g, 1 g, Oral, BID AC, Krystina Preston PA-C, 1 g at 11/24/24 0556    [Held by provider] torsemide (DEMADEX) tablet 30 mg, 30 mg, Oral, BID, Rukhsana Dasilva PA-C, 30 mg at 11/21/24 0813    zolpidem (AMBIEN) tablet 5 mg, 5 mg, Oral, HS PRN, Susannah Mcclain PA-C, 5 mg at 11/20/24 8459      Lab Results: I have reviewed the following results:  Results from last 7 days   Lab Units 11/24/24  0618 11/23/24  1132 11/23/24  0305 11/23/24  0304 11/22/24  0351 11/21/24  0929 11/20/24  1135 11/19/24  0551 11/18/24  0526   WBC Thousand/uL 7.16  --  8.37  --  9.62 9.95  --  9.02 5.74   HEMOGLOBIN g/dL 9.5*  --  9.0*  --  10.2* 11.4*  --  10.7* 9.2*   HEMATOCRIT % 30.8*  --  27.8*  --  31.3* 36.3  --  33.6* 29.3*   PLATELETS Thousands/uL 268  --  227  --  275 256  --  253 235   POTASSIUM mmol/L 4.0 4.1  --   "3.2* 3.0* 3.7 3.7 3.9 4.2   CHLORIDE mmol/L 99 95*  --  97 94* 95* 94* 99 100   CO2 mmol/L 26 25  --  27 29 29 30 29 30   BUN mg/dL 44* 40*  --  38* 31* 24 19 22 28*   CREATININE mg/dL 3.40* 3.14*  --  3.10* 1.78* 1.48* 1.36* 1.23 1.33*   CALCIUM mg/dL 9.3 9.1  --  8.8 9.1 9.4 9.9 9.1 8.8   MAGNESIUM mg/dL  --   --   --  1.7*  --   --   --  2.0 1.6*   PHOSPHORUS mg/dL  --   --   --  4.5*  --   --   --   --   --    ALBUMIN g/dL 3.0*  --   --  2.8* 3.0* 3.2*  --   --  3.0*       Administrative Statements     Portions of the record may have been created with voice recognition software. Occasional wrong word or \"sound a like\" substitutions may have occurred due to the inherent limitations of voice recognition software. Read the chart carefully and recognize, using context, where substitutions have occurred.If you have any questions, please contact the dictating provider.  "

## 2024-11-24 NOTE — ASSESSMENT & PLAN NOTE
S/p recent fall at [facility]  CT left knee showing what appears to be a fracture of her bipartite patella   Seen by orthopedic team  Knee immobilizer x 2 weeks with WBAT to LLE   Office follow-up in 2 weeks with repeat x-rays   PT OT recommending moderate resource intensity-level 2  Palliative care to weight in on pain regimen

## 2024-11-24 NOTE — ASSESSMENT & PLAN NOTE
-Initially there was concern for decompensated heart failure.  Was started on IV diuretics.  Cardiology team was on board.  Diuretics were increased to 60 mg twice daily of IV Lasix.  Patient was appropriately losing weight.  - Also had frequent stool output 11/19  - Weight was improving on bed scale 276 pounds on 11/21, creatinine slightly trending higher 1.48 on 11/21.  Diuretics were held on 11/21 due to slightly rising creatinine and patient needing CT scan with contrast due to complaint of chest pain and risk of PE therefore needed further evaluation per review of notes  - CT imaging with kidneys unremarkable no hydronephrosis  - Urinalysis 11/17 with innumerable WBC  - Overnight 11/22 until 11/23-was noted to be tachycardic.  Was given low-dose intravenous fluids.  - Last dose of IV Lasix was on 11/20 and subsequently received 1 dose of torsemide on 11/21.  Then briefly received IV fluids pre-CT scan.  And then received brief IV fluids on 11/23    Overall, baseline creatinine appears to be low 1 mg/dL, but had acute kidney injury in October admission to 1.7 mg/dL which improved.  Subsequently creatinine remained around 1.2 to 1.3 mg/dL and on this admission 11/17 was 1.2 mg/dL rising to 3.1 mg/dL on 11/23 prompting nephrology consultation.    Etiology-likely multifactorial in the setting of diuresis/possible intravascular depletion/contrast on 11/21 leading to SADAF.  Now with mild hyponatremia developing likely due to lack of free water clearance but when corrected for hyperglycemia, sodium level is only borderline low.  Potassium has been repleted and is appropriate 4.1.  Bicarbonate appropriate..  Patient is developing oliguria.    Course of stay since renal team consulted  - 11/23-we gave intravenous fluids low-dose and maintain diuretics on hold.  - 11/24-creatinine improving but rate of rise is slowing to 3.4 mg/dL from 3.1 mg/dL.  Patient appears adequately volume expanded.  Still remains slightly  oliguric though currently had over 300 cc in the [facility].  Therefore will hold intravenous fluids today as doing and repeat lab work in a.m.  Contributing to the rise is also likely hypotension last night systolic.    Plan  - Hold fluids and diuretics today.  - Give one-time albumin today for supportive measures.  - I/os; avoid nephrotoxic agents  - Avoid hypotension  - BMP in a.m.  - To note patient has slight tremors/jerking like movements of arms.  She tells me that this is a chronic issue and not new.  Therefore at this juncture, I do not believe this is uremic related.  She has no other signs of uremia at this juncture.

## 2024-11-24 NOTE — ASSESSMENT & PLAN NOTE
Results from last 7 days   Lab Units 11/24/24  0618 11/23/24  1132 11/23/24  0304 11/22/24  0351   CREATININE mg/dL 3.40* 3.14* 3.10* 1.78*   Baseline around 1.3   Noted SADAF on CKD now  Cr rise likely secondary to recent diuresis and recent dye load   Received some pre and post scan fluids  Holding nephrotoxins -diuretics   Switched DVT prophylaxis from Lovenox to heparin  Nephrology following   -Holding diuretics and fluids today   -Giving dose of albumin   -Avoid hypotension- Decreased metoprolol succinate 50 mg twice daily --> 12.5 mg twice daily   -Additionally adjusted hold parameter-hold for SBP less than 110

## 2024-11-24 NOTE — ASSESSMENT & PLAN NOTE
Lab Results   Component Value Date    EGFR 12 11/24/2024    EGFR 13 11/23/2024    EGFR 13 11/23/2024    CREATININE 3.40 (H) 11/24/2024    CREATININE 3.14 (H) 11/23/2024    CREATININE 3.10 (H) 11/23/2024

## 2024-11-24 NOTE — PROGRESS NOTES
Progress Note - Cardiology   Name: Mattie Varela 79 y.o. female I MRN: [id number]  Unit/Bed#: E4 -01 I Date of Admission: 11/17/2024   Date of Service: 11/24/2024 I Hospital Day: 7    Assessment & Plan  Acute on chronic diastolic congestive heart failure (HCC)   - TTE 05/23/24: LVEF 70%, grade I diastolic dysfunction, mild LVOT obstruction, moderate annular calcification, mild TR   - BNP 11/17/24: 264 (BNP 05/22/24 was 302)  - CXR 11/17/24: no acute cardiopulmonary disease    - Neurohormonal Blockade:              -- beta blocker: Toprol-XL 50 mg BID  -- ACE/ARB/ARNi: none  -- aldosterone antagonist: none   -- SGLT2: price checked jardiance and would be $25 a day. Not affordable  -- diuretic: torsemide 30 mg daily --> increased to torsemide 30 mg BID (on hold due to kidney function)  -- inpatient diuretic: IV lasix 60 mg BID --> stopped 11/20/24  Hypertension  - BP elevated on admission with systolic 115-162 mmHg  - home regimen: Toprol-XL 50 mg BID  Type 2 diabetes mellitus with other skin complications ([facility])  - A1c 10/12/24: 7.4  - Home meds metformin 500 mg PO BID   Stage 3a chronic kidney disease (HCC)  - follows with outpatient nephrology  - baseline creatinine ~1.3-1.4  - creatine continues to elevate, now 3.40  - nephrology following  Patella fracture  - XR 11/15/24: showing smooth linear lucencies coursing through the superior lateral aspect of the patella, nondisplaced fracture difficult to exclude   - pending CT of lower extremity   - orthopedics following  Urinary retention  - history of urinary retention with spine stimulator in place   - continues to have urinary retention requiring Bateman catheter   - need outpatient voiding trial  Hypothyroidism  - continue levothyroxine   Colitis  - CT 11/21/24: Findings compatible colitis, most prominently involving the descending and sigmoid colon.   - 11/19/24: C.diff pcr negative  - rapid flu negative on admission   Chronic pain disorder  - discussed with  "SLIM with possible GOC conversation as pain is increasing everyday    Plan:  Pt states she is \"wants to go to heaven\" because she is in so much pain. Discussed with SLIM who will try to call her daughter today for GOC conversation. BP on the softer side this AM.   - kidney function continues to rise, nephrology following continue to hold all diuretics and nephrotoxic agents  - agree with decreasing metoprolol to 12.5 mg BID, continue atorvastatin, ASA   - GOC conversation     Subjective:  Pt seen and examined at beside. Very limited exam performed as pt's asks to please not  her as she is in so much pain.     Vitals:  Vitals:    11/23/24 0600 11/24/24 0600   Weight: 80.1 kg (176 lb 9.4 oz) 82.2 kg (181 lb 3.5 oz)   ,  Vitals:    11/23/24 2335 11/23/24 2347 11/24/24 0600 11/24/24 0758   BP: (!) 85/41 (!) 101/45  (!) 100/49   BP Location: Left arm Right arm  Right arm   Pulse: 70   (!) 52   Resp: 18   18   Temp: (!) 97 °F (36.1 °C)   (!) 96.9 °F (36.1 °C)   TempSrc: Temporal   Temporal   SpO2: 94%   97%   Weight:   82.2 kg (181 lb 3.5 oz)    Height:           Exam:  Physical Exam  Vitals and nursing note reviewed.   Constitutional:       General: She is not in acute distress.     Appearance: Normal appearance. She is ill-appearing.   HENT:      Head: Normocephalic and atraumatic.      Nose: Nose normal.      Mouth/Throat:      Mouth: Mucous membranes are moist.   Eyes:      General: No scleral icterus.  Neck:      Vascular: No JVD.   Cardiovascular:      Rate and Rhythm: Normal rate and regular rhythm.      Pulses:           Radial pulses are 2+ on the right side and 2+ on the left side.      Heart sounds: Murmur heard.   Pulmonary:      Effort: Pulmonary effort is normal. No respiratory distress.      Breath sounds: Normal breath sounds. No stridor. No wheezing, rhonchi or rales.   Abdominal:      General: Abdomen is flat.   Musculoskeletal:         General: No swelling. Normal range of motion.      Cervical " back: Normal range of motion.      Right lower leg: No edema.      Left lower leg: No edema.   Skin:     General: Skin is warm and dry.      Capillary Refill: Capillary refill takes less than 2 seconds.   Neurological:      Mental Status: She is alert. Mental status is at baseline.   Psychiatric:         Thought Content: Thought content normal.        Telemetry:       Normal sinus rhythm, , Heart Rate 61    Medications:    Current Facility-Administered Medications:     acetaminophen (TYLENOL) tablet 975 mg, 975 mg, Oral, Q8H [facility] Hospital[facility] PA-[facility], 975 mg at 11/24/24 0556    albuterol (PROVENTIL HFA,VENTOLIN HFA) inhaler 2 puff, 2 puff, Inhalation, Q6H PRN, Paco Carmona MD    aluminum-magnesium hydroxide-simethicone (MAALOX) oral suspension 30 mL, 30 mL, Oral, Q4H PRN, Krystina Preston PA-C, 30 mL at 11/19/24 2159    aspirin chewable tablet 81 mg, 81 mg, Oral, Daily, Paco Carmona MD, 81 mg at 11/24/24 0919    atorvastatin (LIPITOR) tablet 40 mg, 40 mg, Oral, Daily With Dinner, Paco Carmona MD, 40 mg at 11/23/24 1705    ciprofloxacin (CIPRO) IVPB (premix in 5% dextrose) 400 mg 200 mL, 400 mg, Intravenous, Q24H, Perlita Torres MD, Last Rate: 200 mL/hr at 11/23/24 1907, 400 mg at 11/23/24 1907    gabapentin (NEURONTIN) capsule 100 mg, 100 mg, Oral, HS, Krystina Preston PA-C, 100 mg at 11/23/24 2134    guaiFENesin (MUCINEX) 12 hr tablet 600 mg, 600 mg, Oral, Q12H [facility] HospitalBi PA-C, 600 mg at 11/24/24 0919    heparin (porcine) subcutaneous injection 5,000 Units, 5,000 Units, Subcutaneous, Q8H ARYSusannah PA-C, 5,000 Units at 11/24/24 0555    HYDROmorphone HCl (DILAUDID) injection 0.2 mg, 0.2 mg, Intravenous, Q6H PRN, Susannah Mcclain PA-C, 0.2 mg at 11/23/24 1104    insulin lispro (HumALOG/ADMELOG) 100 units/mL subcutaneous injection 1-6 Units, 1-6 Units, Subcutaneous, 4x Daily (AC & HS), 3 Units at 11/23/24 5425 **AND** Fingerstick Glucose (POCT), , , 4x Daily AC and at  bedtime, NANY Miller    latanoprost (XALATAN) 0.005 % ophthalmic solution 1 drop, 1 drop, Both Eyes, HS, Paco Carmona MD, 1 drop at 11/22/24 2125    levalbuterol (XOPENEX) inhalation solution 1.25 mg, 1.25 mg, Nebulization, TID, Susannah Mcclain PA-C, 1.25 mg at 11/23/24 1433    levothyroxine tablet 37.5 mcg, 37.5 mcg, Oral, Early Morning, Paco Carmona MD, 37.5 mcg at 11/24/24 0556    lidocaine (LIDODERM) 5 % patch 1 patch, 1 patch, Topical, Daily, Perlita Torres MD, 1 patch at 11/23/24 0903    loperamide (IMODIUM) capsule 2 mg, 2 mg, Oral, 4x Daily PRN, Perlita Torres MD, 2 mg at 11/22/24 0636    meclizine (ANTIVERT) tablet 12.5 mg, 12.5 mg, Oral, Daily PRN, Paco Carmona MD    melatonin tablet 3 mg, 3 mg, Oral, HS PRN, Bi Luke PA-C, 3 mg at 11/22/24 2139    metoprolol succinate (TOPROL-XL) 24 hr tablet 12.5 mg, 12.5 mg, Oral, Q12H ARY, Susannah Mcclain PA-C    metroNIDAZOLE (FLAGYL) IVPB (premix) 500 mg 100 mL, 500 mg, Intravenous, Q8H, Perlita Torres MD, Last Rate: 200 mL/hr at 11/24/24 0330, 500 mg at 11/24/24 0330    Milnacipran HCl TABS 100 mg, 1 tablet, Oral, Daily, Paco Carmona MD    oxyCODONE (ROXICODONE) immediate release tablet 10 mg, 10 mg, Oral, Q6H PRN, Krystina Preston PA-C, 10 mg at 11/24/24 0919    oxyCODONE (ROXICODONE) IR tablet 5 mg, 5 mg, Oral, Q6H PRN, Krystina Preston PA-C, 5 mg at 11/22/24 0640    pantoprazole (PROTONIX) EC tablet 40 mg, 40 mg, Oral, Early Morning, Paco Carmona MD, 40 mg at 11/24/24 0556    sodium chloride (OCEAN) 0.65 % nasal spray 2 spray, 2 spray, Each Nare, BID, Susannah Mcclain PA-C    sucralfate (CARAFATE) tablet 1 g, 1 g, Oral, BID AC, Krystina Preston PA-C, 1 g at 11/24/24 0556    [Held by provider] torsemide (DEMADEX) tablet 30 mg, 30 mg, Oral, BID, Rukhsana Dasilva PA-C, 30 mg at 11/21/24 0813    zolpidem (AMBIEN) tablet 5 mg, 5 mg, Oral, HS PRN, Susannah Mcclain PA-C, 5 mg at 11/20/24  2123    Labs/Data:        Results from last 7 days   Lab Units 11/24/24  0618 11/23/24  0305 11/22/24  0351   WBC Thousand/uL 7.16 8.37 9.62   HEMOGLOBIN g/dL 9.5* 9.0* 10.2*   HEMATOCRIT % 30.8* 27.8* 31.3*   PLATELETS Thousands/uL 268 227 275     Results from last 7 days   Lab Units 11/24/24  0618 11/23/24  1132 11/23/24  0304   POTASSIUM mmol/L 4.0 4.1 3.2*   CHLORIDE mmol/L 99 95* 97   CO2 mmol/L 26 25 27   BUN mg/dL 44* 40* 38*   CREATININE mg/dL 3.40* 3.14* 3.10*     Rukhsana Dasilva PA-C

## 2024-11-24 NOTE — ASSESSMENT & PLAN NOTE
Lab Results   Component Value Date    HGBA1C 7.4 (H) 10/12/2024       Recent Labs     11/23/24  1109 11/23/24  1622 11/23/24 2127 11/24/24  0758   POCGLU 324* 197* 236* 133       Blood Sugar Average: Last 72 hrs:  (P) 211    -Management per primary

## 2024-11-24 NOTE — ASSESSMENT & PLAN NOTE
Home regimen metoprolol succinate 50 mg twice daily  Dosage reduced in the setting of soft blood pressures  Currently on metoprolol succinate 12.5 mg twice daily-for SBP less than 110

## 2024-11-24 NOTE — ASSESSMENT & PLAN NOTE
Lab Results   Component Value Date    HGBA1C 7.4 (H) 10/12/2024     Recent Labs     11/23/24  1622 11/23/24  2127 11/24/24  0758 11/24/24  1123   POCGLU 197* 236* 133 156*   Holding Metformin  Continue insulin sliding scale  Added Premeal insulin at 3 units 3 times daily with meals

## 2024-11-24 NOTE — ASSESSMENT & PLAN NOTE
Had large bowel movement 11/18 and has had frequent bouts of stool after this  C. difficile and enteric pathogen panel negative  COVID, flu, RSV negative  Had abdominal pain and poor oral intake  CT abdomen: diffuse colonic wall thickening and mucosal hyperenhancement compatible with colitis most prominently involving the descending and sigmoid colon.  Colonic fluid compatible with scattered colonic diverticuli.  Started on IV anbiotics with ceftriaxone and metronidazole- day #4/5- orders updated with end time  Diarrhea subsiding

## 2024-11-24 NOTE — ASSESSMENT & PLAN NOTE
Wt Readings from Last 3 Encounters:   11/24/24 82.2 kg (181 lb 3.5 oz)   10/18/24 74.3 kg (163 lb 12.8 oz)   10/02/24 85.5 kg (188 lb 9.6 oz)     -Management per primary

## 2024-11-24 NOTE — ASSESSMENT & PLAN NOTE
Results from last 7 days   Lab Units 11/24/24  0618 11/23/24  1132 11/23/24  0304 11/22/24  0351   POTASSIUM mmol/L 4.0 4.1 3.2* 3.0*   Likely secondary to recent diuresis, diarrhea, and poor oral intake  Repleted and resolved  Magnesium low at 1.7-repleted  Recheck mag in a.m.

## 2024-11-24 NOTE — ASSESSMENT & PLAN NOTE
CT L-spine from 11/15/24 shows no acute fractures.  Evaluation of stenosis is limited by hardware from prior fusion  In absence of focal neulologic deficit, no further imaging is indicated  Continue medical pain management  Activity as tolerated  If symptoms worsen or if focal neurologic deficit develops, would consider formal spine consult  F/U with ortho spine or neurosurgery for outpatient management

## 2024-11-24 NOTE — PROGRESS NOTES
Progress Note - Hospitalist   Name: Mattie Varela 79 y.o. female I MRN: [id number]  Unit/Bed#: E4 -01 I Date of Admission: 11/17/2024   Date of Service: 11/24/2024 I Hospital Day: 7    Assessment & Plan  Acute on chronic diastolic congestive heart failure (HCC)  Wt Readings from Last 3 Encounters:   11/24/24 82.2 kg (181 lb 3.5 oz)   10/18/24 74.3 kg (163 lb 12.8 oz)   10/02/24 85.5 kg (188 lb 9.6 oz)   79 year old female presented with shortness of breath, lower extremity edema, volume overload, and weight gain secondary to acute on chronic diastolic congestive heart failure.  Echo was updated-EF 51%, systolic function low normal, septic akinetic to paradoxical, endocardial detection was significantly limited.  Unable to assess diastolic function  Takes torsemide 30 mg daily  Received IV lasix 40 mg bid --> increased to 60 mg BID  On metoprolol 50 mg twice daily  Down about 6.3 L, admission weight 194 pounds, current weight stable at 176 pounds  Cardiology price checking Jardiance-$25 per day-unable to afford  Volume status much improved and cardiology would like to transition to oral diuretics however now holding diuretics given SADAF  Continue to monitor volume status closely  Colitis  Had large bowel movement 11/18 and has had frequent bouts of stool after this  C. difficile and enteric pathogen panel negative  COVID, flu, RSV negative  Had abdominal pain and poor oral intake  CT abdomen: diffuse colonic wall thickening and mucosal hyperenhancement compatible with colitis most prominently involving the descending and sigmoid colon.  Colonic fluid compatible with scattered colonic diverticuli.  Started on IV anbiotics with ceftriaxone and metronidazole- day #4/5- orders updated with end time  Diarrhea subsiding  Chest pain  Complained of chest pain with inspiration  Obtained EKG- without signs of acute ischemia, Trop was 22  Chest x-ray: without pneumonia, effusion, pneumothorax  Patient with risk factors  for PE of immobility, recent fracture -PE ruled out with CTA chest  Noted heavy atherosclerotic coronary artery calcification  Last cath was in 2023 with known disease that was treated with medical management  Discussed with cardiology and ongoing medical management recommended  Patella fracture  S/p recent fall at [facility]  CT left knee showing what appears to be a fracture of her bipartite patella   Seen by orthopedic team  Knee immobilizer x 2 weeks with WBAT to LLE   Office follow-up in 2 weeks with repeat x-rays   PT OT recommending moderate resource intensity-level 2  Palliative care to weight in on pain regimen  Stage 3a chronic kidney disease (HCC)  Results from last 7 days   Lab Units 11/24/24  0618 11/23/24  1132 11/23/24  0304 11/22/24  0351   CREATININE mg/dL 3.40* 3.14* 3.10* 1.78*   Baseline around 1.3   Noted SADAF on CKD now  Cr rise likely secondary to recent diuresis and recent dye load   Received some pre and post scan fluids  Holding nephrotoxins -diuretics   Switched DVT prophylaxis from Lovenox to heparin  Nephrology following   -Holding diuretics and fluids today   -Giving dose of albumin   -Avoid hypotension- Decreased metoprolol succinate 50 mg twice daily --> 12.5 mg twice daily   -Additionally adjusted hold parameter-hold for SBP less than 110  Hypokalemia  Results from last 7 days   Lab Units 11/24/24  0618 11/23/24  1132 11/23/24  0304 11/22/24  0351   POTASSIUM mmol/L 4.0 4.1 3.2* 3.0*   Likely secondary to recent diuresis, diarrhea, and poor oral intake  Repleted and resolved  Magnesium low at 1.7-repleted  Recheck mag in a.m.  Type 2 diabetes mellitus with other skin complications ([facility])  Lab Results   Component Value Date    HGBA1C 7.4 (H) 10/12/2024     Recent Labs     11/23/24  1622 11/23/24  2127 11/24/24  0758 11/24/24  1123   POCGLU 197* 236* 133 156*   Holding Metformin  Continue insulin sliding scale  Added Premeal insulin at 3 units 3 times daily with meals  Urinary retention  Had  overactive bladder in the past and received Botox.   Continues to have urinary retention requiring ahn catheter placement. For outpatient voiding trial.  Void trial should occur with urology in the outpt setting  UA >100,000  cfu GNR. Was recently treated for Klebsiella cystitis  Without symptoms, fever or leukocytosis & chronic ahn, monitor off abx  Chronic pain disorder  History of chronic pain with degenerative spine and history of spinal stimulator in place (although does not work)  On pain medication in the outpatient setting  PDMP queried- no red flags  Has been prescribed oxycodone/acetaminophen 10/325 tabs and oxycodone 10 mg tabs  Current regimen oxycodone 5 mg prn for moderate pain & oxycodone 10 mg for severe pain  Added dilaudid for breakthrough pain 11/22  Patient complaining of persistent pain-discussed recent CT imaging in regards to spine  Already has Lidoderm patches in place and on scheduled Robaxin  Discontinue all muscle relaxers in the setting of SADAF  Scheduled Tylenol 975 mg 3 times daily  Trial dose of valium however with over sedate of affect-avoid further doses of Valium  Reached out to orthopedics for additional input/evaluation  Orthopedics consulted in regards to back pain  Consulted palliative care for further guidance and pain regimen/control  Hypertension  Home regimen metoprolol succinate 50 mg twice daily  Dosage reduced in the setting of soft blood pressures  Currently on metoprolol succinate 12.5 mg twice daily-for SBP less than 110  Hypothyroidism  Continue levothyroxine  Insomnia  Was placed on melatonin however reports this is ineffective  Requested additional sleep aid  Has trialed Ambien in the past and this worked well for her    VTE Pharmacologic Prophylaxis:   Heparin    Mobility:   Basic Mobility Inpatient Raw Score: 8  -[facility] Goal: 3: Sit at edge of bed  -[facility] Achieved: 1: Laying in bed      Patient Centered Rounds: Yes  Discussions with Specialists or Other Care  "Team Provider: Nursing, , palliative care, nephrology, cardiology    Education and Discussions with Family / Patient: Patient, daughter via telephone-updated    Current Length of Stay: 7 day(s)  Current Patient Status: Inpatient   Certification Statement: With need for continued inpatient stay for pain management and SADAF  Discharge Plan: >72 hrs    Code Status: Level 1 - Full Code    Subjective   Seen while resting in bed.  She continues to complain of ongoing pain that is now \"throughout my whole body\".  Her Bateman continues to drain.  Reports she wants to go to [facility] so she will no longer be in pain. Reports that she feels the Lord is near. She does not want to partake in exam and asks to be left alone.    Called daughter and updated via telephone in regards to pain, SADAF, and colitis.  She is in agreement with palliative care consult in hopes that this will provide additional pain relief to her mom.  She does want to avoid over sedated state however.  Discussed goals of care with possible progression to hospice however her and family members are not ready for this at this time.  She would like to see how the patient progresses and if kidney function will improve along with treating pain.  If patient were to further decline or kidney function were to not recover, she would consider conversation with family to further stop aggressive measures.    Objective :  Temp:  [96.9 °F (36.1 °C)-98.2 °F (36.8 °C)] 96.9 °F (36.1 °C)  HR:  [52-94] 52  BP: ()/(41-64) 100/49  Resp:  [18] 18  SpO2:  [94 %-97 %] 97 %  O2 Device: None (Room air)    Body mass index is 35.39 kg/m².     Input and Output Summary (last 24 hours):     Intake/Output Summary (Last 24 hours) at 11/24/2024 1231  Last data filed at 11/24/2024 1100  Gross per 24 hour   Intake --   Output 600 ml   Net -600 ml       Physical Exam  Vitals reviewed.   Constitutional:       Appearance: She is normal weight. She is ill-appearing. She is not " diaphoretic.      Comments: Keeps eyes closed on exam   HENT:      Head: Normocephalic and atraumatic.   Eyes:      General: No scleral icterus.  Cardiovascular:      Rate and Rhythm: Normal rate and regular rhythm.      Heart sounds: Murmur heard.   Pulmonary:      Effort: No respiratory distress.      Breath sounds: No stridor. No wheezing or rhonchi.   Abdominal:      Palpations: There is no mass.      Tenderness: There is no abdominal tenderness.   Genitourinary:     Comments: Bateman catheter in place draining yellow urine  Musculoskeletal:         General: No swelling.      Cervical back: Neck supple.   Skin:     General: Skin is warm and dry.   Neurological:      Comments: Oriented to person and place   Psychiatric:         Attention and Perception: She is inattentive.         Mood and Affect: Mood is depressed.      Comments: Does not want to partake in conversation           Lines/Drains:  Lines/Drains/Airways       Active Status       Name Placement date Placement time Site Days    Urethral Catheter 16 Fr. 10/26/24  1311  --  29                  Urinary Catheter:  Goal for removal: N/A- Discharging with Bateman           Telemetry:  Telemetry Orders (From admission, onward)               24 Hour Telemetry Monitoring  Continuous x 24 Hours (Telem)        Question:  Reason for 24 Hour Telemetry  Answer:  Decompensated CHF- and any one of the following: continuous diuretic infusion or total diuretic dose >200 mg daily, associated electrolyte derangement (I.e. K < 3.0), ionotropic drip (continuous infusion), hx of ventricular arrhythmia, or new EF < 35%                      Lab Results: I have reviewed the following results:   Results from last 7 days   Lab Units 11/24/24  0618 11/21/24  0929 11/19/24  0551   WBC Thousand/uL 7.16   < > 9.02   HEMOGLOBIN g/dL 9.5*   < > 10.7*   HEMATOCRIT % 30.8*   < > 33.6*   PLATELETS Thousands/uL 268   < > 253   SEGS PCT %  --   --  65   LYMPHO PCT %  --   --  20   MONO PCT %   --   --  12   EOS PCT %  --   --  3    < > = values in this interval not displayed.     Results from last 7 days   Lab Units 11/24/24  0618   SODIUM mmol/L 133*   POTASSIUM mmol/L 4.0   CHLORIDE mmol/L 99   CO2 mmol/L 26   BUN mg/dL 44*   CREATININE mg/dL 3.40*   ANION GAP mmol/L 8   CALCIUM mg/dL 9.3   ALBUMIN g/dL 3.0*   TOTAL BILIRUBIN mg/dL 0.30   ALK PHOS U/L 76   ALT U/L 5*   AST U/L 13   GLUCOSE RANDOM mg/dL 136         Results from last 7 days   Lab Units 11/24/24  1123 11/24/24  0758 11/23/24  2127 11/23/24  1622 11/23/24  1109 11/23/24  0742 11/22/24  2156 11/22/24  1614 11/22/24  1100 11/22/24  0957 11/22/24  0740 11/22/24  0106   POC GLUCOSE mg/dl 156* 133 236* 197* 324* 193* 267* 290* 260* 222* 99 152*               Recent Cultures (last 7 days):   Results from last 7 days   Lab Units 11/19/24  0011   C DIFF TOXIN B BY PCR  Negative             Last 24 Hours Medication List:     Current Facility-Administered Medications:     acetaminophen (TYLENOL) tablet 975 mg, Q8H ARY    albuterol (PROVENTIL HFA,VENTOLIN HFA) inhaler 2 puff, Q6H PRN    aluminum-magnesium hydroxide-simethicone (MAALOX) oral suspension 30 mL, Q4H PRN    aspirin chewable tablet 81 mg, Daily    atorvastatin (LIPITOR) tablet 40 mg, Daily With Dinner    ciprofloxacin (CIPRO) IVPB (premix in 5% dextrose) 400 mg 200 mL, Q24H, Last Rate: 400 mg (11/23/24 1907)    gabapentin (NEURONTIN) capsule 100 mg, HS    guaiFENesin (MUCINEX) 12 hr tablet 600 mg, Q12H ARY    heparin (porcine) subcutaneous injection 5,000 Units, Q8H ARY    HYDROmorphone HCl (DILAUDID) injection 0.2 mg, Q6H PRN    insulin lispro (HumALOG/ADMELOG) 100 units/mL subcutaneous injection 1-6 Units, 4x Daily (AC & HS) **AND** Fingerstick Glucose (POCT), 4x Daily AC and at bedtime    latanoprost (XALATAN) 0.005 % ophthalmic solution 1 drop, HS    levalbuterol (XOPENEX) inhalation solution 1.25 mg, TID    levothyroxine tablet 37.5 mcg, Early Morning    lidocaine (LIDODERM) 5 % patch  1 patch, Daily    loperamide (IMODIUM) capsule 2 mg, 4x Daily PRN    meclizine (ANTIVERT) tablet 12.5 mg, Daily PRN    melatonin tablet 3 mg, HS PRN    metoprolol succinate (TOPROL-XL) 24 hr tablet 12.5 mg, Q12H ARY    metroNIDAZOLE (FLAGYL) IVPB (premix) 500 mg 100 mL, Q8H, Last Rate: 500 mg (11/24/24 1141)    Milnacipran HCl TABS 100 mg, Daily    oxyCODONE (ROXICODONE) immediate release tablet 10 mg, Q6H PRN    oxyCODONE (ROXICODONE) IR tablet 5 mg, Q6H PRN    pantoprazole (PROTONIX) EC tablet 40 mg, Early Morning    sodium chloride (OCEAN) 0.65 % nasal spray 2 spray, BID    sucralfate (CARAFATE) tablet 1 g, BID AC    [Held by provider] torsemide (DEMADEX) tablet 30 mg, BID    zolpidem (AMBIEN) tablet 5 mg, HS PRN    Administrative Statements   Today, Patient Was Seen By: Susannah Mcclain PA-C      **Please Note: This note may have been constructed using a voice recognition system.**

## 2024-11-24 NOTE — PLAN OF CARE
Problem: Potential for Falls  Goal: Patient will remain free of falls  Description: INTERVENTIONS:  - Educate patient/family on patient safety including physical limitations  - Instruct patient to call for assistance with activity   - Consult OT/PT to assist with strengthening/mobility   - Keep Call bell within reach  - Keep bed low and locked with side rails adjusted as appropriate  - Keep care items and personal belongings within reach  - Initiate and maintain comfort rounds  - Make Fall Risk Sign visible to staff  - Offer Toileting every 2 Hours, in advance of need  - Initiate/Maintain bed alarm  - Obtain necessary fall risk management equipment: In place   - Apply yellow socks and bracelet for high fall risk patients  - Consider moving patient to room near nurses station  Outcome: Progressing     Problem: PAIN - ADULT  Goal: Verbalizes/displays adequate comfort level or baseline comfort level  Description: Interventions:  - Encourage patient to monitor pain and request assistance  - Assess pain using appropriate pain scale  - Administer analgesics based on type and severity of pain and evaluate response  - Implement non-pharmacological measures as appropriate and evaluate response  - Consider cultural and social influences on pain and pain management  - Notify physician/advanced practitioner if interventions unsuccessful or patient reports new pain  Outcome: Progressing     Problem: SAFETY ADULT  Goal: Patient will remain free of falls  Description: INTERVENTIONS:  - Educate patient/family on patient safety including physical limitations  - Instruct patient to call for assistance with activity   - Consult OT/PT to assist with strengthening/mobility   - Keep Call bell within reach  - Keep bed low and locked with side rails adjusted as appropriate  - Keep care items and personal belongings within reach  - Initiate and maintain comfort rounds  - Make Fall Risk Sign visible to staff  - Offer Toileting every 2  Hours, in advance of need  - Initiate/Maintain bed alarm  - Obtain necessary fall risk management equipment: In place   - Apply yellow socks and bracelet for high fall risk patients  - Consider moving patient to room near nurses station  Outcome: Progressing  Goal: Maintain or return to baseline ADL function  Description: INTERVENTIONS:  -  Assess patient's ability to carry out ADLs; assess patient's baseline for ADL function and identify physical deficits which impact ability to perform ADLs (bathing, care of mouth/teeth, toileting, grooming, dressing, etc.)  - Assess/evaluate cause of self-care deficits   - Assess range of motion  - Assess patient's mobility; develop plan if impaired  - Assess patient's need for assistive devices and provide as appropriate  - Encourage maximum independence but intervene and supervise when necessary  - Involve family in performance of ADLs  - Assess for home care needs following discharge   - Consider OT consult to assist with ADL evaluation and planning for discharge  - Provide patient education as appropriate  Outcome: Progressing  Goal: Maintains/Returns to pre admission functional level  Description: INTERVENTIONS:  - Perform AM-PAC 6 Click Basic Mobility/ Daily Activity assessment daily.  - Set and communicate daily mobility goal to care team and patient/family/caregiver.   - Collaborate with rehabilitation services on mobility goals if consulted  - Perform Range of Motion 3 times a day.  - Reposition patient every 2 hours.  - Dangle patient 3 times a day  - Stand patient 3 times a day  - Ambulate patient 3 times a day  - Out of bed to chair 3 times a day   - Out of bed for meals 3 times a day  - Out of bed for toileting  - Record patient progress and toleration of activity level   Outcome: Progressing     Problem: DISCHARGE PLANNING  Goal: Discharge to home or other facility with appropriate resources  Description: INTERVENTIONS:  - Identify barriers to discharge w/patient  and caregiver  - Arrange for needed discharge resources and transportation as appropriate  - Identify discharge learning needs (meds, wound care, etc.)  - Arrange for interpretive services to assist at discharge as needed  - Refer to Case Management Department for coordinating discharge planning if the patient needs post-hospital services based on physician/advanced practitioner order or complex needs related to functional status, cognitive ability, or social support system  Outcome: Progressing     Problem: Knowledge Deficit  Goal: Patient/family/caregiver demonstrates understanding of disease process, treatment plan, medications, and discharge instructions  Description: Complete learning assessment and assess knowledge base.  Interventions:  - Provide teaching at level of understanding  - Provide teaching via preferred learning methods  Outcome: Progressing     Problem: CARDIOVASCULAR - ADULT  Goal: Maintains optimal cardiac output and hemodynamic stability  Description: INTERVENTIONS:  - Monitor I/O, vital signs and rhythm  - Monitor for S/S and trends of decreased cardiac output  - Administer and titrate ordered vasoactive medications to optimize hemodynamic stability  - Assess quality of pulses, skin color and temperature  - Assess for signs of decreased coronary artery perfusion  - Instruct patient to report change in severity of symptoms  Outcome: Progressing  Goal: Absence of cardiac dysrhythmias or at baseline rhythm  Description: INTERVENTIONS:  - Continuous cardiac monitoring, vital signs, obtain 12 lead EKG if ordered  - Administer antiarrhythmic and heart rate control medications as ordered  - Monitor electrolytes and administer replacement therapy as ordered  Outcome: Progressing     Problem: RESPIRATORY - ADULT  Goal: Achieves optimal ventilation and oxygenation  Description: INTERVENTIONS:  - Assess for changes in respiratory status  - Assess for changes in mentation and behavior  - Position to  facilitate oxygenation and minimize respiratory effort  - Oxygen administered by appropriate delivery if ordered  - Initiate smoking cessation education as indicated  - Encourage broncho-pulmonary hygiene including cough, deep breathe, Incentive Spirometry  - Assess the need for suctioning and aspirate as needed  - Assess and instruct to report SOB or any respiratory difficulty  - Respiratory Therapy support as indicated  Outcome: Progressing     Problem: Prexisting or High Potential for Compromised Skin Integrity  Goal: Skin integrity is maintained or improved  Description: INTERVENTIONS:  - Identify patients at risk for skin breakdown  - Assess and monitor skin integrity  - Assess and monitor nutrition and hydration status  - Monitor labs   - Assess for incontinence   - Turn and reposition patient  - Assist with mobility/ambulation  - Relieve pressure over bony prominences  - Avoid friction and shearing  - Provide appropriate hygiene as needed including keeping skin clean and dry  - Evaluate need for skin moisturizer/barrier cream  - Collaborate with interdisciplinary team   - Patient/family teaching  - Consider wound care consult   Outcome: Progressing

## 2024-11-24 NOTE — ASSESSMENT & PLAN NOTE
- CT of the Left knee reviewed today showing what appears to be a fracture of her bipartite patella  - Continue with KI.  - WBAT LLE with knee immobilizer on  - PT/OT  - Pain control PRN  - Medical management per primary  - Follow up outpatient in 2 weeks with Dr. Velasco in the office with repeat xrays.

## 2024-11-24 NOTE — ASSESSMENT & PLAN NOTE
-History of diastolic heart failure  - Echocardiogram in May with EF 70%, grade 1 diastolic dysfunction, mild tricuspid regurgitation  - Cardiology on board  - Initially was started on IV diuretics and diuretics were increased on 11/18 by cardiology team.  Subsequently diuretics were held due to rising creatinine.

## 2024-11-24 NOTE — ASSESSMENT & PLAN NOTE
>>ASSESSMENT AND PLAN FOR ELECTROLYTE IMBALANCE RISK WRITTEN ON 11/24/2024 11:07 AM BY RAJANI EPSTEIN MD    - Sodium level okay for now 133.  Slightly low due to lack of free water clearance in the setting of SADAF and tubular dysfunction.

## 2024-11-24 NOTE — ASSESSMENT & PLAN NOTE
- Sodium level okay for now 133.  Slightly low due to lack of free water clearance in the setting of SADAF and tubular dysfunction.

## 2024-11-24 NOTE — ASSESSMENT & PLAN NOTE
Wt Readings from Last 3 Encounters:   11/24/24 82.2 kg (181 lb 3.5 oz)   10/18/24 74.3 kg (163 lb 12.8 oz)   10/02/24 85.5 kg (188 lb 9.6 oz)   79 year old female presented with shortness of breath, lower extremity edema, volume overload, and weight gain secondary to acute on chronic diastolic congestive heart failure.  Echo was updated-EF 51%, systolic function low normal, septic akinetic to paradoxical, endocardial detection was significantly limited.  Unable to assess diastolic function  Takes torsemide 30 mg daily  Received IV lasix 40 mg bid --> increased to 60 mg BID  On metoprolol 50 mg twice daily  Down about 6.3 L, admission weight 194 pounds, current weight stable at 176 pounds  Cardiology price checking Jardiance-$25 per day-unable to afford  Volume status much improved and cardiology would like to transition to oral diuretics however now holding diuretics given SADAF  Continue to monitor volume status closely

## 2024-11-25 PROBLEM — N17.9 AKI (ACUTE KIDNEY INJURY) (HCC): Status: ACTIVE | Noted: 2024-11-25

## 2024-11-25 LAB
ANION GAP SERPL CALCULATED.3IONS-SCNC: 7 MMOL/L (ref 4–13)
BUN SERPL-MCNC: 40 MG/DL (ref 5–25)
CALCIUM SERPL-MCNC: 8.7 MG/DL (ref 8.4–10.2)
CHLORIDE SERPL-SCNC: 101 MMOL/L (ref 96–108)
CO2 SERPL-SCNC: 26 MMOL/L (ref 21–32)
CREAT SERPL-MCNC: 2.64 MG/DL (ref 0.6–1.3)
ERYTHROCYTE [DISTWIDTH] IN BLOOD BY AUTOMATED COUNT: 14.2 % (ref 11.6–15.1)
GFR SERPL CREATININE-BSD FRML MDRD: 16 ML/MIN/1.73SQ M
GLUCOSE SERPL-MCNC: 150 MG/DL (ref 65–140)
GLUCOSE SERPL-MCNC: 152 MG/DL (ref 65–140)
GLUCOSE SERPL-MCNC: 201 MG/DL (ref 65–140)
GLUCOSE SERPL-MCNC: 240 MG/DL (ref 65–140)
GLUCOSE SERPL-MCNC: 251 MG/DL (ref 65–140)
HCT VFR BLD AUTO: 27.1 % (ref 34.8–46.1)
HGB BLD-MCNC: 8.5 G/DL (ref 11.5–15.4)
MAGNESIUM SERPL-MCNC: 2.3 MG/DL (ref 1.9–2.7)
MCH RBC QN AUTO: 28.4 PG (ref 26.8–34.3)
MCHC RBC AUTO-ENTMCNC: 31.4 G/DL (ref 31.4–37.4)
MCV RBC AUTO: 91 FL (ref 82–98)
PHOSPHATE SERPL-MCNC: 4.4 MG/DL (ref 2.3–4.1)
PLATELET # BLD AUTO: 270 THOUSANDS/UL (ref 149–390)
PMV BLD AUTO: 10.1 FL (ref 8.9–12.7)
POTASSIUM SERPL-SCNC: 3.7 MMOL/L (ref 3.5–5.3)
RBC # BLD AUTO: 2.99 MILLION/UL (ref 3.81–5.12)
SODIUM SERPL-SCNC: 134 MMOL/L (ref 135–147)
WBC # BLD AUTO: 6.9 THOUSAND/UL (ref 4.31–10.16)

## 2024-11-25 PROCEDURE — 83540 ASSAY OF IRON: CPT | Performed by: INTERNAL MEDICINE

## 2024-11-25 PROCEDURE — 84100 ASSAY OF PHOSPHORUS: CPT | Performed by: INTERNAL MEDICINE

## 2024-11-25 PROCEDURE — 99232 SBSQ HOSP IP/OBS MODERATE 35: CPT | Performed by: INTERNAL MEDICINE

## 2024-11-25 PROCEDURE — 83550 IRON BINDING TEST: CPT | Performed by: INTERNAL MEDICINE

## 2024-11-25 PROCEDURE — 85027 COMPLETE CBC AUTOMATED: CPT | Performed by: INTERNAL MEDICINE

## 2024-11-25 PROCEDURE — 83735 ASSAY OF MAGNESIUM: CPT | Performed by: INTERNAL MEDICINE

## 2024-11-25 PROCEDURE — 80048 BASIC METABOLIC PNL TOTAL CA: CPT | Performed by: INTERNAL MEDICINE

## 2024-11-25 PROCEDURE — 82948 REAGENT STRIP/BLOOD GLUCOSE: CPT

## 2024-11-25 RX ORDER — GABAPENTIN 300 MG/1
300 CAPSULE ORAL
Status: DISCONTINUED | OUTPATIENT
Start: 2024-11-25 | End: 2024-12-02 | Stop reason: HOSPADM

## 2024-11-25 RX ORDER — GUAIFENESIN 200 MG/10ML
LIQUID ORAL 3 TIMES DAILY PRN
Status: DISCONTINUED | OUTPATIENT
Start: 2024-11-25 | End: 2024-12-02 | Stop reason: HOSPADM

## 2024-11-25 RX ORDER — HYDROMORPHONE HCL IN WATER/PF 6 MG/30 ML
0.2 PATIENT CONTROLLED ANALGESIA SYRINGE INTRAVENOUS EVERY 4 HOURS PRN
Status: DISCONTINUED | OUTPATIENT
Start: 2024-11-25 | End: 2024-12-02 | Stop reason: HOSPADM

## 2024-11-25 RX ORDER — HYDROMORPHONE HYDROCHLORIDE 2 MG/1
2 TABLET ORAL 4 TIMES DAILY
Refills: 0 | Status: DISCONTINUED | OUTPATIENT
Start: 2024-11-25 | End: 2024-12-02 | Stop reason: HOSPADM

## 2024-11-25 RX ADMIN — SALINE NASAL SPRAY 2 SPRAY: 1.5 SOLUTION NASAL at 08:22

## 2024-11-25 RX ADMIN — INSULIN LISPRO 3 UNITS: 100 INJECTION, SOLUTION INTRAVENOUS; SUBCUTANEOUS at 12:38

## 2024-11-25 RX ADMIN — PANTOPRAZOLE SODIUM 40 MG: 40 TABLET, DELAYED RELEASE ORAL at 05:49

## 2024-11-25 RX ADMIN — INSULIN LISPRO 3 UNITS: 100 INJECTION, SOLUTION INTRAVENOUS; SUBCUTANEOUS at 17:43

## 2024-11-25 RX ADMIN — METRONIDAZOLE 500 MG: 5 INJECTION, SOLUTION INTRAVENOUS at 12:38

## 2024-11-25 RX ADMIN — GUAIFENESIN 600 MG: 600 TABLET, EXTENDED RELEASE ORAL at 08:20

## 2024-11-25 RX ADMIN — ASPIRIN 81 MG CHEWABLE TABLET 81 MG: 81 TABLET CHEWABLE at 08:20

## 2024-11-25 RX ADMIN — HYDROMORPHONE HYDROCHLORIDE 2 MG: 2 TABLET ORAL at 21:42

## 2024-11-25 RX ADMIN — ACETAMINOPHEN 975 MG: 325 TABLET, FILM COATED ORAL at 05:49

## 2024-11-25 RX ADMIN — SUCRALFATE 1 G: 1 TABLET ORAL at 17:40

## 2024-11-25 RX ADMIN — GABAPENTIN 300 MG: 300 CAPSULE ORAL at 21:41

## 2024-11-25 RX ADMIN — LEVOTHYROXINE SODIUM 37.5 MCG: 75 TABLET ORAL at 05:49

## 2024-11-25 RX ADMIN — INSULIN LISPRO 2 UNITS: 100 INJECTION, SOLUTION INTRAVENOUS; SUBCUTANEOUS at 21:42

## 2024-11-25 RX ADMIN — HYDROMORPHONE HYDROCHLORIDE 0.2 MG: 0.2 INJECTION, SOLUTION INTRAMUSCULAR; INTRAVENOUS; SUBCUTANEOUS at 13:46

## 2024-11-25 RX ADMIN — SUCRALFATE 1 G: 1 TABLET ORAL at 08:20

## 2024-11-25 RX ADMIN — ACETAMINOPHEN 975 MG: 325 TABLET, FILM COATED ORAL at 13:40

## 2024-11-25 RX ADMIN — ACETAMINOPHEN 975 MG: 325 TABLET, FILM COATED ORAL at 21:40

## 2024-11-25 RX ADMIN — HYDROMORPHONE HYDROCHLORIDE 0.2 MG: 0.2 INJECTION, SOLUTION INTRAMUSCULAR; INTRAVENOUS; SUBCUTANEOUS at 05:49

## 2024-11-25 RX ADMIN — HEPARIN SODIUM 5000 UNITS: 5000 INJECTION INTRAVENOUS; SUBCUTANEOUS at 21:43

## 2024-11-25 RX ADMIN — OXYCODONE HYDROCHLORIDE 10 MG: 10 TABLET ORAL at 12:46

## 2024-11-25 RX ADMIN — OXYCODONE HYDROCHLORIDE 10 MG: 10 TABLET ORAL at 03:40

## 2024-11-25 RX ADMIN — GUAIFENESIN 600 MG: 600 TABLET, EXTENDED RELEASE ORAL at 21:42

## 2024-11-25 RX ADMIN — HEPARIN SODIUM 5000 UNITS: 5000 INJECTION INTRAVENOUS; SUBCUTANEOUS at 13:40

## 2024-11-25 RX ADMIN — HEPARIN SODIUM 5000 UNITS: 5000 INJECTION INTRAVENOUS; SUBCUTANEOUS at 05:54

## 2024-11-25 RX ADMIN — METRONIDAZOLE 500 MG: 5 INJECTION, SOLUTION INTRAVENOUS at 03:36

## 2024-11-25 RX ADMIN — HYDROMORPHONE HYDROCHLORIDE 2 MG: 2 TABLET ORAL at 17:40

## 2024-11-25 RX ADMIN — INSULIN LISPRO 1 UNITS: 100 INJECTION, SOLUTION INTRAVENOUS; SUBCUTANEOUS at 08:23

## 2024-11-25 RX ADMIN — MELATONIN 3 MG: 3 TAB ORAL at 21:51

## 2024-11-25 RX ADMIN — SALINE NASAL SPRAY 2 SPRAY: 1.5 SOLUTION NASAL at 21:43

## 2024-11-25 RX ADMIN — ATORVASTATIN CALCIUM 40 MG: 40 TABLET, FILM COATED ORAL at 17:40

## 2024-11-25 NOTE — ASSESSMENT & PLAN NOTE
Results from last 7 days   Lab Units 11/25/24  0539 11/24/24  0618 11/23/24  1132 11/23/24  0304   CREATININE mg/dL 2.64* 3.40* 3.14* 3.10*   Baseline around 1.3   Noted SADAF on CKD now  Cr rise likely secondary to recent diuresis and recent dye load   Received some pre and post scan fluids  Holding nephrotoxins -diuretics   Switched DVT prophylaxis from Lovenox to heparin  Nephrology following   -Holding diuretics and fluids today   -Giving dose of albumin   -Avoid hypotension- Decreased metoprolol succinate 50 mg twice daily --> 12.5 mg twice daily   -Additionally adjusted hold parameter-hold for SBP less than 110

## 2024-11-25 NOTE — PROGRESS NOTES
Progress Note - Cardiology   Name: Mattie Varela 79 y.o. female I MRN: [id number]  Unit/Bed#: E4 -01 I Date of Admission: 11/17/2024   Date of Service: 11/25/2024 I Hospital Day: 8    Assessment & Plan  Acute on chronic diastolic congestive heart failure (HCC)   - TTE 05/23/24: LVEF 70%, grade I diastolic dysfunction, mild LVOT obstruction, moderate annular calcification, mild TR   - BNP 11/17/24: 264 (BNP 05/22/24 was 302)  - CXR 11/17/24: no acute cardiopulmonary disease    -Home meds: torsemide 30 mg BID (on hold due to kidney function   - inpatient diuretic: IV lasix 60 mg BID --> stopped due to SADAF 11/20/24  -Echo on 11/22/24 shows LV EF 51%, IVS: There is abnormal septal motion consistent with LBBB, Mitral Valve: There is moderate annular calcification   Hypertension  - BP elevated on admission with systolic 115-162 mmHg  - home regimen: Toprol-XL 50 mg BID  Type 2 diabetes mellitus with other skin complications ([facility])  - A1c 10/12/24: 7.4  - Home meds metformin 500 mg PO BID   Stage 3a chronic kidney disease (HCC)  - follows with outpatient nephrology  - baseline creatinine ~1.3-1.4  - creatine continues to elevate, now 3.40  - nephrology following  Patella fracture  - XR 11/15/24: showing smooth linear lucencies coursing through the superior lateral aspect of the patella, nondisplaced fracture difficult to exclude   - pending CT of lower extremity   - orthopedics following  Urinary retention  - history of urinary retention with spine stimulator in place   - continues to have urinary retention requiring Bateman catheter   - need outpatient voiding trial  Hypothyroidism  - continue levothyroxine   Colitis  - CT 11/21/24: Findings compatible colitis, most prominently involving the descending and sigmoid colon.   - 11/19/24: C.diff pcr negative  - rapid flu negative on admission   Chronic pain disorder  - discussed with SLIM with possible GOC conversation as pain is increasing everyday  Degenerative lumbar  spinal stenosis    SADAF (acute kidney injury) (HCC)    Chest pain    Insomnia    Hypokalemia    At risk for acid-base imbalance    Electrolyte imbalance risk      Plan:  -Continue to hold diuretics till SADAF resolved  -Current wt 185 pounds per bed scale.   -Creatinine 2.64 today trending down, likely due to IV fluids challenges vs Increased PO intake   -Labs today shows Na 134, K 3.7 Cr 2.64, GFR 16. Her dry weight is 166 lbs.  - Continue to hold torsemide 30 mg PO BID  -Avoid nephrotoxic agents  - AM CMP  - strict I/O's and daily weights   - Continue ASA 81 mg daily, atorvastatin 40 mg daily and Toprol-XL 50 mg BID  -Continue the maintenance fluid, consider stopping if her p.o. intake is increased.  - Echo on 11/22/24 shows EF 51%  -From cardiology standpoint, she is not in over fluid load and she can continue taking her aspirin and atorvastatin and Toprol.  Encourage more p.o. intake and nephrology follow-up.    -Cardiology will sign off and We will be available upon request.    Subjective:  Pt seen and examined at bedside during rounds. No significant acute events overnight. She states that she didn't sleep well overnight. The diarrhea stopped and that her appetite is getting better.  She was off maintenance fluid and she is taking p.o. she she denies shortness of breath, palpitation, cough.  She denies diarrhea, constipation, nausea, abdominal pain.  No other complaints at this point.  The current management plan has been discussed with her and she is agreeable to it.  Vitals:  Vitals:    11/24/24 0600 11/25/24 0600   Weight: 82.2 kg (181 lb 3.5 oz) 84.2 kg (185 lb 10 oz)   ,  Vitals:    11/24/24 1523 11/24/24 2308 11/25/24 0600 11/25/24 0744   BP: 105/59 123/64  109/63   BP Location: Right arm Right arm  Right arm   Pulse: 86 83  86   Resp: 18 18  18   Temp: 97.7 °F (36.5 °C) 98.6 °F (37 °C)  97.6 °F (36.4 °C)   TempSrc: Temporal Temporal  Temporal   SpO2: 95% 94%  96%   Weight:   84.2 kg (185 lb 10 oz)     Height:           Exam:  Physical Exam  Vitals and nursing note reviewed.   Constitutional:       General: She is not in acute distress.     Appearance: Normal appearance. She is not ill-appearing.   HENT:      Head: Normocephalic and atraumatic.      Nose: Nose normal.      Mouth/Throat:      Mouth: Mucous membranes are moist.   Eyes:      General: No scleral icterus.  Neck:      Vascular: No JVD.   Cardiovascular:      Rate and Rhythm: Normal rate and regular rhythm.      Pulses:           Radial pulses are 2+ on the right side and 2+ on the left side.      Heart sounds: Murmur heard.      Systolic murmur is present with a grade of 2/6.   Pulmonary:      Effort: Pulmonary effort is normal. No respiratory distress.      Breath sounds: Normal breath sounds. No stridor. No wheezing, rhonchi or rales.   Abdominal:      General: Abdomen is flat.   Musculoskeletal:         General: No swelling. Normal range of motion.      Cervical back: Normal range of motion.      Right lower leg: Edema (Trace) present.      Left lower leg: Edema (Trace) present.   Skin:     General: Skin is warm and dry.      Capillary Refill: Capillary refill takes less than 2 seconds.   Neurological:      Mental Status: She is alert. Mental status is at baseline.   Psychiatric:         Thought Content: Thought content normal.      Medications:    Current Facility-Administered Medications:     acetaminophen (TYLENOL) tablet 975 mg, 975 mg, Oral, Q8H ARY, Susannah Mcclain PA-C, 975 mg at 11/25/24 0549    albuterol (PROVENTIL HFA,VENTOLIN HFA) inhaler 2 puff, 2 puff, Inhalation, Q6H PRN, Paco Carmona MD, 2 puff at 11/1943    aluminum-magnesium hydroxide-simethicone (MAALOX) oral suspension 30 mL, 30 mL, Oral, Q4H PRN, Krystina Preston PA-C, 30 mL at 11/19/24 2159    aspirin chewable tablet 81 mg, 81 mg, Oral, Daily, Paco Carmona MD, 81 mg at 11/25/24 0820    atorvastatin (LIPITOR) tablet 40 mg, 40 mg, Oral, Daily With Dinner, Paco  MD Mathew, 40 mg at 11/24/24 1657    ciprofloxacin (CIPRO) IVPB (premix in 5% dextrose) 400 mg 200 mL, 400 mg, Intravenous, Q24H, Susannah Mcclain PA-C, Stopped at 11/24/24 2158    gabapentin (NEURONTIN) capsule 100 mg, 100 mg, Oral, HS, Krystina Preston PA-C, 100 mg at 11/24/24 2109    guaiFENesin (MUCINEX) 12 hr tablet 600 mg, 600 mg, Oral, Q12H ARY, Bi uLke PA-C, 600 mg at 11/25/24 0820    heparin (porcine) subcutaneous injection 5,000 Units, 5,000 Units, Subcutaneous, Q8H ARY, Susannah Mcclain PA-C, 5,000 Units at 11/25/24 0554    HYDROmorphone HCl (DILAUDID) injection 0.2 mg, 0.2 mg, Intravenous, Q6H PRN, Susannah Mcclain PA-C, 0.2 mg at 11/25/24 0549    insulin lispro (HumALOG/ADMELOG) 100 units/mL subcutaneous injection 1-6 Units, 1-6 Units, Subcutaneous, 4x Daily (AC & HS), 1 Units at 11/25/24 0823 **AND** Fingerstick Glucose (POCT), , , 4x Daily AC and at bedtime, NANY Miller    latanoprost (XALATAN) 0.005 % ophthalmic solution 1 drop, 1 drop, Both Eyes, HS, Paco Carmona MD, 1 drop at 11/22/24 2125    levalbuterol (XOPENEX) inhalation solution 1.25 mg, 1.25 mg, Nebulization, Q6H PRN, Perlita Torres MD    levothyroxine tablet 37.5 mcg, 37.5 mcg, Oral, Early Morning, Paco Carmona MD, 37.5 mcg at 11/25/24 0549    lidocaine (LIDODERM) 5 % patch 1 patch, 1 patch, Topical, Daily, Perlita Torres MD, 1 patch at 11/23/24 0903    loperamide (IMODIUM) capsule 2 mg, 2 mg, Oral, 4x Daily PRN, Perlita Torres MD, 2 mg at 11/22/24 0636    meclizine (ANTIVERT) tablet 12.5 mg, 12.5 mg, Oral, Daily PRN, Paco Carmona MD    melatonin tablet 3 mg, 3 mg, Oral, HS PRN, Bi Luke PA-C, 3 mg at 11/22/24 6269    metoprolol succinate (TOPROL-XL) 24 hr tablet 12.5 mg, 12.5 mg, Oral, Daily, Jackson Morris MD    metroNIDAZOLE (FLAGYL) IVPB (premix) 500 mg 100 mL, 500 mg, Intravenous, Q8H, Susannah Mcclain PA-C, Last Rate: 200 mL/hr at 11/25/24 0336, 500 mg at 11/25/24 0336     Milnacipran HCl TABS 100 mg, 1 tablet, Oral, Daily, Paco Carmona MD    oxyCODONE (ROXICODONE) immediate release tablet 10 mg, 10 mg, Oral, Q6H PRN, Krystina Preston PA-C, 10 mg at 11/25/24 0340    oxyCODONE (ROXICODONE) IR tablet 5 mg, 5 mg, Oral, Q6H PRN, Krystina Preston PA-C, 5 mg at 11/22/24 0640    pantoprazole (PROTONIX) EC tablet 40 mg, 40 mg, Oral, Early Morning, Paco Carmona MD, 40 mg at 11/25/24 0549    sodium chloride (OCEAN) 0.65 % nasal spray 2 spray, 2 spray, Each Nare, BID, Susannah Mcclain PA-C, 2 spray at 11/25/24 0822    sucralfate (CARAFATE) tablet 1 g, 1 g, Oral, BID AC, Krystina Preston PA-C, 1 g at 11/25/24 0820    [Held by provider] torsemide (DEMADEX) tablet 30 mg, 30 mg, Oral, BID, Rukhsana Dasilva PA-C, 30 mg at 11/21/24 0813    zolpidem (AMBIEN) tablet 5 mg, 5 mg, Oral, HS PRN, Susannah Mcclain PA-C, 5 mg at 11/20/24 2123    Labs/Data:        Results from last 7 days   Lab Units 11/25/24  0539 11/24/24  0618 11/23/24  0305   WBC Thousand/uL 6.90 7.16 8.37   HEMOGLOBIN g/dL 8.5* 9.5* 9.0*   HEMATOCRIT % 27.1* 30.8* 27.8*   PLATELETS Thousands/uL 270 268 227     Results from last 7 days   Lab Units 11/25/24  0539 11/24/24  0618 11/23/24  1132   POTASSIUM mmol/L 3.7 4.0 4.1   CHLORIDE mmol/L 101 99 95*   CO2 mmol/L 26 26 25   BUN mg/dL 40* 44* 40*   CREATININE mg/dL 2.64* 3.40* 3.14*     Celso Dotson MD

## 2024-11-25 NOTE — ASSESSMENT & PLAN NOTE
Etiology: Multifactorial: In the setting of diuresis/intravascular depletion with GI loss/contrast exposure on 11/21/2021 leading to SADAF  Initially admitted for decompensated heart failure treated with IV diuretics transition to oral diuretics currently off  Admission creatinine 1.23  Creatinine 3.14 in 11/23/2024  Creatinine 2.64 today  Status post albumin  Per Dr. Alvarado's note yesterday ,to note patient has slight tremors/jerking like movements of arms. She tells me that this is a chronic issue and not new. Therefore at this juncture, I do not believe this is uremic related. She has no other signs of uremia at this juncture.   Workup  CT imaging with kidneys unremarkable no hydronephrosis  Urinalysis 11/17 with innumerable WBC  Plan:  Need to monitor off diuretics  BMP magnesium Phos in a.m.  AVoid hypotension or perturbations of blood pressure to prevent decreased renal perfusion  Avoid nephrotoxins, NSAIDs and limit IV contrast if possible

## 2024-11-25 NOTE — ASSESSMENT & PLAN NOTE
Sodium level okay for now 134.    Slightly low due to lack of free water clearance in the setting of SADAF and tubular dysfunction.  Need to monitor and treat as needed  Recommend fluid restriction and improve glucose control

## 2024-11-25 NOTE — ASSESSMENT & PLAN NOTE
- TTE 05/23/24: LVEF 70%, grade I diastolic dysfunction, mild LVOT obstruction, moderate annular calcification, mild TR   - BNP 11/17/24: 264 (BNP 05/22/24 was 302)  - CXR 11/17/24: no acute cardiopulmonary disease    -Home meds: torsemide 30 mg BID (on hold due to kidney function   - inpatient diuretic: IV lasix 60 mg BID --> stopped due to SADAF 11/20/24  -Echo on 11/22/24 shows LV EF 51%, IVS: There is abnormal septal motion consistent with LBBB, Mitral Valve: There is moderate annular calcification

## 2024-11-25 NOTE — ASSESSMENT & PLAN NOTE
Current blood pressure is acceptable  Patient did have some hypotension 11/23/2024  On beta-blocker.  Metoprolol XL  Avoid hypotension

## 2024-11-25 NOTE — PROGRESS NOTES
Progress Note - Hospitalist   Name: Mattie Varela 79 y.o. female I MRN: [id number]  Unit/Bed#: E4 -01 I Date of Admission: 11/17/2024   Date of Service: 11/25/2024 I Hospital Day: 8    Assessment & Plan  Acute on chronic diastolic congestive heart failure (HCC)  Wt Readings from Last 3 Encounters:   11/25/24 84.2 kg (185 lb 10 oz)   10/18/24 74.3 kg (163 lb 12.8 oz)   10/02/24 85.5 kg (188 lb 9.6 oz)   79 year old female presented with shortness of breath, lower extremity edema, volume overload, and weight gain secondary to acute on chronic diastolic congestive heart failure.  Echo was updated-EF 51%, systolic function low normal, septic akinetic to paradoxical, endocardial detection was significantly limited.  Unable to assess diastolic function  On metoprolol 50 mg twice daily  Down about 6.3 L, admission weight 194 pounds, current weight stable at 176 pounds  The cost of Jardiance is prohibitive.  Diuretics are currently on hold because of azotemia.  Colitis  Had large bowel movement 11/18 and has had frequent bouts of stool after this  Had abdominal pain and poor oral intake  CT abdomen: diffuse colonic wall thickening and mucosal hyperenhancement compatible with colitis most prominently involving the descending and sigmoid colon.  Colonic fluid compatible with scattered colonic diverticuli.  The patient has completed treatment with ceftriaxone and metronidazole.  Diarrhea resolved  Chest pain  Complained of chest pain with inspiration  Obtained EKG- without signs of acute ischemia, Trop was 22  Chest x-ray: without pneumonia, effusion, pneumothorax  Patient with risk factors for PE of immobility, recent fracture -PE ruled out with CTA chest  Noted heavy atherosclerotic coronary artery calcification  Last cath was in 2023 with known disease that was treated with medical management  Discussed with cardiology and ongoing medical management recommended  Patella fracture  S/p recent fall at [facility]  CT left knee  showing what appears to be a fracture of her bipartite patella   Seen by orthopedic team  Knee immobilizer x 2 weeks with WBAT to LLE   Office follow-up in 2 weeks with repeat x-rays   PT OT recommending moderate resource intensity-level 2    Stage 3a chronic kidney disease (HCC)  Results from last 7 days   Lab Units 11/25/24  0539 11/24/24  0618 11/23/24  1132 11/23/24  0304   CREATININE mg/dL 2.64* 3.40* 3.14* 3.10*   Baseline around 1.3   Noted SADAF on CKD now  Cr rise likely secondary to recent diuresis and recent dye load   Received some pre and post scan fluids  Holding nephrotoxins -diuretics   Switched DVT prophylaxis from Lovenox to heparin  Nephrology following   -Holding diuretics and fluids today   -Giving dose of albumin   -Avoid hypotension- Decreased metoprolol succinate 50 mg twice daily --> 12.5 mg twice daily   -Additionally adjusted hold parameter-hold for SBP less than 110  Hypokalemia  Results from last 7 days   Lab Units 11/25/24  0539 11/24/24  0618 11/23/24  1132 11/23/24  0304   POTASSIUM mmol/L 3.7 4.0 4.1 3.2*   Likely secondary to recent diuresis, diarrhea, and poor oral intake  Repleted and resolved  Magnesium low at 1.7-repleted    Type 2 diabetes mellitus with other skin complications (HCC)  Lab Results   Component Value Date    HGBA1C 7.4 (H) 10/12/2024     Recent Labs     11/24/24  2056 11/25/24  0751 11/25/24  1234 11/25/24  1546   POCGLU 182* 150* 240* 251*   Holding Metformin  Continue insulin sliding scale  Added Premeal insulin at 3 units 3 times daily with meals  Urinary retention  Had overactive bladder in the past and received Botox.   Continues to have urinary retention requiring ahn catheter placement. For outpatient voiding trial.  Void trial should occur with urology in the outpt setting  UA >100,000  cfu GNR. Was recently treated for Klebsiella cystitis  Without symptoms, fever or leukocytosis & chronic ahn, monitor off abx  Chronic pain disorder  History of chronic  pain with degenerative spine and history of spinal stimulator in place (although does not work)  On pain medication in the outpatient setting  PDMP queried- no red flags  The patient has not found oxycodone helpful.  She will be started on oral hydromorphone.  Muscle relaxants and other sedating drugs will be stopped.  Gabapentin will be increased somewhat.  Hypertension  Home regimen metoprolol succinate 50 mg twice daily  Dosage reduced in the setting of lowish blood pressures  Currently on metoprolol succinate 12.5 mg twice daily-for SBP less than 110  Hypothyroidism  Continue levothyroxine  Insomnia  Zolpidem has been stopped since the patient has been started on hydromorphone.  Degenerative lumbar spinal stenosis    SADAF (acute kidney injury) (HCC)  Patient's diuretics are on hold.  Her creatinine has improved.  Will continue to monitor this.          Subjective:  The patient complains of severe back pain.  This has been a chronic problem but has gotten worse over the past several days.  Imaging has been unrevealing.  The patient has been on oxycodone which has not been helpful.  She has been getting a small dose of intravenous hydromorphone which she finds much more helpful.  She has also been treated with a variety of muscle relaxants.  These have been stopped because of her kidney function.  She is on gabapentin 100 mg at bedtime for restless leg syndrome.  The patient denies chest pain or shortness of breath.  She was able to eat.  She has no abdominal pain, nausea, or vomiting.    Physical Exam:   Temp:  [97.5 °F (36.4 °C)-98.6 °F (37 °C)] 97.5 °F (36.4 °C)  HR:  [83-94] 94  BP: (109-136)/(62-75) 125/75  Resp:  [16-18] 16  SpO2:  [94 %-97 %] 95 %  O2 Device: None (Room air)    Gen: Well-developed, obese, in no acute distress.  Neck: Supple.  No lymphadenopathy or goiter.  Heart: Regular rhythm.  I heard no murmur or gallop.  Lungs: Diminished breath sounds bilaterally.  No wheezing, rales, or rhonchi.  Abd:  Soft with active bowel sounds.  No mass or tenderness.  Extremities: No clubbing, cyanosis, or edema.  No calf tenderness.  Neuro: Alert and oriented.  No focal sign.  Skin: Warm and dry.      LABS:   CBC:   Lab Results   Component Value Date    WBC 6.90 11/25/2024    HGB 8.5 (L) 11/25/2024    HCT 27.1 (L) 11/25/2024    MCV 91 11/25/2024     11/25/2024    RBC 2.99 (L) 11/25/2024    MCH 28.4 11/25/2024    MCHC 31.4 11/25/2024    RDW 14.2 11/25/2024    MPV 10.1 11/25/2024   , CMP:   Lab Results   Component Value Date    SODIUM 134 (L) 11/25/2024    K 3.7 11/25/2024     11/25/2024    CO2 26 11/25/2024    BUN 40 (H) 11/25/2024    CREATININE 2.64 (H) 11/25/2024    CALCIUM 8.7 11/25/2024    EGFR 16 11/25/2024               VTE Pharmacologic Prophylaxis: Heparin  VTE Mechanical Prophylaxis: reason for no mechanical VTE prophylaxis leg pain

## 2024-11-25 NOTE — ASSESSMENT & PLAN NOTE
History of urinary retention, spine stimulator in place   Bateman catheter care per primary team  Of note, was recently treated in October for Klebsiella cystitis

## 2024-11-25 NOTE — ASSESSMENT & PLAN NOTE
Wt Readings from Last 3 Encounters:   11/25/24 84.2 kg (185 lb 10 oz)   10/18/24 74.3 kg (163 lb 12.8 oz)   10/02/24 85.5 kg (188 lb 9.6 oz)   79 year old female presented with shortness of breath, lower extremity edema, volume overload, and weight gain secondary to acute on chronic diastolic congestive heart failure.  Echo was updated-EF 51%, systolic function low normal, septic akinetic to paradoxical, endocardial detection was significantly limited.  Unable to assess diastolic function  On metoprolol 50 mg twice daily  Down about 6.3 L, admission weight 194 pounds, current weight stable at 176 pounds  The cost of Jardiance is prohibitive.  Diuretics are currently on hold because of azotemia.

## 2024-11-25 NOTE — PLAN OF CARE
Problem: Potential for Falls  Goal: Patient will remain free of falls  Description: INTERVENTIONS:  - Educate patient/family on patient safety including physical limitations  - Instruct patient to call for assistance with activity   - Consult OT/PT to assist with strengthening/mobility   - Keep Call bell within reach  - Keep bed low and locked with side rails adjusted as appropriate  - Keep care items and personal belongings within reach  - Initiate and maintain comfort rounds  - Make Fall Risk Sign visible to staff  - Offer Toileting every 2 Hours, in advance of need  - Initiate/Maintain bed alarm  - Obtain necessary fall risk management equipment:   - Apply yellow socks and bracelet for high fall risk patients  - Consider moving patient to room near nurses station  Outcome: Progressing     Problem: PAIN - ADULT  Goal: Verbalizes/displays adequate comfort level or baseline comfort level  Description: Interventions:  - Encourage patient to monitor pain and request assistance  - Assess pain using appropriate pain scale  - Administer analgesics based on type and severity of pain and evaluate response  - Implement non-pharmacological measures as appropriate and evaluate response  - Consider cultural and social influences on pain and pain management  - Notify physician/advanced practitioner if interventions unsuccessful or patient reports new pain  Outcome: Progressing     Problem: SAFETY ADULT  Goal: Patient will remain free of falls  Description: INTERVENTIONS:  - Educate patient/family on patient safety including physical limitations  - Instruct patient to call for assistance with activity   - Consult OT/PT to assist with strengthening/mobility   - Keep Call bell within reach  - Keep bed low and locked with side rails adjusted as appropriate  - Keep care items and personal belongings within reach  - Initiate and maintain comfort rounds  - Make Fall Risk Sign visible to staff  - Offer Toileting every 2 Hours, in  advance of need  - Initiate/Maintain bed alarm  - Obtain necessary fall risk management equipment:   - Apply yellow socks and bracelet for high fall risk patients  - Consider moving patient to room near nurses station  Outcome: Progressing  Goal: Maintain or return to baseline ADL function  Description: INTERVENTIONS:  -  Assess patient's ability to carry out ADLs; assess patient's baseline for ADL function and identify physical deficits which impact ability to perform ADLs (bathing, care of mouth/teeth, toileting, grooming, dressing, etc.)  - Assess/evaluate cause of self-care deficits   - Assess range of motion  - Assess patient's mobility; develop plan if impaired  - Assess patient's need for assistive devices and provide as appropriate  - Encourage maximum independence but intervene and supervise when necessary  - Involve family in performance of ADLs  - Assess for home care needs following discharge   - Consider OT consult to assist with ADL evaluation and planning for discharge  - Provide patient education as appropriate  Outcome: Progressing  Goal: Maintains/Returns to pre admission functional level  Description: INTERVENTIONS:  - Perform AM-PAC 6 Click Basic Mobility/ Daily Activity assessment daily.  - Set and communicate daily mobility goal to care team and patient/family/caregiver.   - Collaborate with rehabilitation services on mobility goals if consulted  - Perform Range of Motion 4 times a day.  - Reposition patient every 5 hours.  - Dangle patient 4 times a day  - Stand patient 4 times a day  - Ambulate patient 4 times a day  - Out of bed to chair 4 times a day   - Out of bed for meals 4 times a day  - Out of bed for toileting  - Record patient progress and toleration of activity level   Outcome: Progressing

## 2024-11-25 NOTE — ASSESSMENT & PLAN NOTE
Results from last 7 days   Lab Units 11/25/24  0539 11/24/24  0618 11/23/24  1132 11/23/24  0304   POTASSIUM mmol/L 3.7 4.0 4.1 3.2*   Likely secondary to recent diuresis, diarrhea, and poor oral intake  Repleted and resolved  Magnesium low at 1.7-repleted

## 2024-11-25 NOTE — ASSESSMENT & PLAN NOTE
History of chronic pain with degenerative spine and history of spinal stimulator in place (although does not work)  On pain medication in the outpatient setting  PDMP queried- no red flags  The patient has not found oxycodone helpful.  She will be started on oral hydromorphone.  Muscle relaxants and other sedating drugs will be stopped.  Gabapentin will be increased somewhat.

## 2024-11-25 NOTE — ASSESSMENT & PLAN NOTE
History of diastolic heart failure  Initially treated with IV diuretics transition to oral diuretics now currently off  Echocardiogram in May with EF 70%, grade 1 diastolic dysfunction, mild tricuspid regurgitation  Cardiology was following patient and currently signed off today  Continue to monitor for diuretic need

## 2024-11-25 NOTE — PROGRESS NOTES
Progress Note - Nephrology   Name: Mattie Varela 79 y.o. female I MRN: [id number]  Unit/Bed#: E4 -01 I Date of Admission: 11/17/2024   Date of Service: 11/25/2024 I Hospital Day: 8     Assessment & Plan  SADAF (acute kidney injury) (HCC)  Etiology: Multifactorial: In the setting of diuresis/intravascular depletion with GI loss/contrast exposure on 11/21/2021 leading to SADAF  Initially admitted for decompensated heart failure treated with IV diuretics transition to oral diuretics currently off  Admission creatinine 1.23  Creatinine 3.14 in 11/23/2024  Creatinine 2.64 today  Status post albumin  Per Dr. Alvarado's note yesterday ,to note patient has slight tremors/jerking like movements of arms. She tells me that this is a chronic issue and not new. Therefore at this juncture, I do not believe this is uremic related. She has no other signs of uremia at this juncture.   Workup  CT imaging with kidneys unremarkable no hydronephrosis  Urinalysis 11/17 with innumerable WBC  Plan:  Need to monitor off diuretics  BMP magnesium Phos in a.m.  AVoid hypotension or perturbations of blood pressure to prevent decreased renal perfusion  Avoid nephrotoxins, NSAIDs and limit IV contrast if possible  Stage 3a chronic kidney disease (HCC)  Etiology: Suspect diabetic kidney disease, hypertensive nephrosclerosis, arterionephrosclerosis and age-related nephron loss  Baseline creatinine 1.0-1.4  Patient follows with Dr. Rutherford and was last seen on 9/26/2024  Will need follow-up on discharge  Hypertension  Current blood pressure is acceptable  Patient did have some hypotension 11/23/2024  On beta-blocker.  Metoprolol XL  Avoid hypotension  Acute on chronic diastolic congestive heart failure (HCC)  History of diastolic heart failure  Initially treated with IV diuretics transition to oral diuretics now currently off  Echocardiogram in May with EF 70%, grade 1 diastolic dysfunction, mild tricuspid regurgitation  Cardiology was following  patient and currently signed off today  Continue to monitor for diuretic need  At risk for acid-base imbalance  Bicarb acceptable  Continue to monitor and treat as needed  Electrolyte imbalance risk  Sodium level okay for now 134.    Slightly low due to lack of free water clearance in the setting of SADAF and tubular dysfunction.  Need to monitor and treat as needed  Recommend fluid restriction and improve glucose control  Chronic pain disorder  Per primary team-may be contributing to low sodium  Hypothyroidism  Management per  primary team  Urinary retention  History of urinary retention, spine stimulator in place   Bateman catheter care per primary team  Of note, was recently treated in October for Klebsiella cystitis  Chest pain  Per cardiology and primary team  No PE on CT scan  Patella fracture  Left knee lateral facet patellar fracture-knee immobilizer recommended by orthopedic team  Colitis  CT scan with concerning for colitis  Started on antibiotics by primary team  Hypokalemia  Status post supplementation and now resolved and potassium is stable at 3.7.  Type 2 diabetes mellitus with other skin complications (HCC)  Lab Results   Component Value Date    HGBA1C 7.4 (H) 10/12/2024         Overall plan and recommendations has been reviewed with primary team and I agree with the plan as stated below      Review of Systems  Patient seen and examined at bedside.  Patient reports feels okay.  Feels leg edema has improved  Review of Systems   Constitutional:  Positive for fatigue. Negative for activity change, appetite change, chills, diaphoresis and fever.   HENT: Negative.  Negative for congestion and facial swelling.    Respiratory: Negative.     Cardiovascular:  Positive for leg swelling.   Gastrointestinal: Negative.    Endocrine: Negative.    Genitourinary: Negative.    Musculoskeletal: Negative.    Skin: Negative.    Allergic/Immunologic: Negative.    Neurological: Negative.    Hematological: Negative.     Psychiatric/Behavioral: Negative.           Physical Exam:  Current Weight: Weight - Scale: 84.2 kg (185 lb 10 oz)  Vitals:    11/24/24 2308 11/25/24 0600 11/25/24 0744 11/25/24 1241   BP: 123/64  109/63 136/62   BP Location: Right arm  Right arm Right arm   Pulse: 83  86 89   Resp: 18  18 18   Temp: 98.6 °F (37 °C)  97.6 °F (36.4 °C) 97.5 °F (36.4 °C)   TempSrc: Temporal  Temporal Temporal   SpO2: 94%  96% 97%   Weight:  84.2 kg (185 lb 10 oz)     Height:           Physical Exam  Vitals and nursing note reviewed.   Constitutional:       Appearance: Normal appearance.   HENT:      Head: Normocephalic and atraumatic.      Nose: Nose normal.      Mouth/Throat:      Mouth: Mucous membranes are moist.   Eyes:      Extraocular Movements: Extraocular movements intact.      Conjunctiva/sclera: Conjunctivae normal.   Cardiovascular:      Rate and Rhythm: Regular rhythm.      Pulses: Normal pulses.      Heart sounds: Murmur heard.   Pulmonary:      Effort: Pulmonary effort is normal.      Breath sounds: Normal breath sounds.   Abdominal:      General: Bowel sounds are normal.      Palpations: Abdomen is soft.   Genitourinary:     Comments: Bateman catheter patent for clear yellow urine  Musculoskeletal:         General: Normal range of motion.      Cervical back: Normal range of motion and neck supple.      Right lower leg: Edema present.      Left lower leg: Edema present.      Comments: Lower extremity edema   Skin:     General: Skin is warm and dry.      Coloration: Skin is pale.   Neurological:      General: No focal deficit present.      Mental Status: She is alert and oriented to person, place, and time.   Psychiatric:         Mood and Affect: Mood normal.         Behavior: Behavior normal.            Medications:    Current Facility-Administered Medications:     acetaminophen (TYLENOL) tablet 975 mg, 975 mg, Oral, Q8H Susannah MCALLISTER PA-C, 975 mg at 11/25/24 1340    albuterol (PROVENTIL HFA,VENTOLIN HFA) inhaler 2  puff, 2 puff, Inhalation, Q6H PRN, Paco Carmona MD, 2 puff at 11/1943    aluminum-magnesium hydroxide-simethicone (MAALOX) oral suspension 30 mL, 30 mL, Oral, Q4H PRN, Krystina Preston PA-C, 30 mL at 11/19/24 2159    aspirin chewable tablet 81 mg, 81 mg, Oral, Daily, Paco Carmnoa MD, 81 mg at 11/25/24 0820    atorvastatin (LIPITOR) tablet 40 mg, 40 mg, Oral, Daily With Dinner, Paco Carmona MD, 40 mg at 11/24/24 1657    gabapentin (NEURONTIN) capsule 300 mg, 300 mg, Oral, HS, Khurram Ceron MD    guaiFENesin (MUCINEX) 12 hr tablet 600 mg, 600 mg, Oral, Q12H ARY, Bi Luke PA-C, 600 mg at 11/25/24 0820    heparin (porcine) subcutaneous injection 5,000 Units, 5,000 Units, Subcutaneous, Q8H ARY, Susannah Mcclain PA-C, 5,000 Units at 11/25/24 1340    HYDROmorphone (DILAUDID) tablet 2 mg, 2 mg, Oral, 4x Daily, Khurram Ceron MD    HYDROmorphone HCl (DILAUDID) injection 0.2 mg, 0.2 mg, Intravenous, Q4H PRN, Khurram Ceron MD    insulin lispro (HumALOG/ADMELOG) 100 units/mL subcutaneous injection 1-6 Units, 1-6 Units, Subcutaneous, 4x Daily (AC & HS), 3 Units at 11/25/24 1238 **AND** Fingerstick Glucose (POCT), , , 4x Daily AC and at bedtime, NANY Miller    latanoprost (XALATAN) 0.005 % ophthalmic solution 1 drop, 1 drop, Both Eyes, HS, Paco Carmona MD, 1 drop at 11/22/24 2125    levalbuterol (XOPENEX) inhalation solution 1.25 mg, 1.25 mg, Nebulization, Q6H PRN, Perlita Torres MD    levothyroxine tablet 37.5 mcg, 37.5 mcg, Oral, Early Morning, Paco Carmona MD, 37.5 mcg at 11/25/24 0549    lidocaine (LIDODERM) 5 % patch 1 patch, 1 patch, Topical, Daily, Perlita Torres MD, 1 patch at 11/23/24 0903    loperamide (IMODIUM) capsule 2 mg, 2 mg, Oral, 4x Daily PRN, Perlita Torres MD, 2 mg at 11/22/24 0636    meclizine (ANTIVERT) tablet 12.5 mg, 12.5 mg, Oral, Daily PRN, Paco Carmona MD    melatonin tablet 3 mg, 3 mg, Oral, HS PRN, Bi Luke PA-C, 3 mg at 11/22/24  "2139    metoprolol succinate (TOPROL-XL) 24 hr tablet 12.5 mg, 12.5 mg, Oral, Daily, Jackson Morris MD    pantoprazole (PROTONIX) EC tablet 40 mg, 40 mg, Oral, Early Morning, Paco Carmona MD, 40 mg at 11/25/24 0549    sodium chloride (OCEAN) 0.65 % nasal spray 2 spray, 2 spray, Each Nare, BID, Khurram Ceron MD, 2 spray at 11/25/24 0822    sucralfate (CARAFATE) tablet 1 g, 1 g, Oral, BID AC, Krystina Preston PA-C, 1 g at 11/25/24 0820    [Held by provider] torsemide (DEMADEX) tablet 30 mg, 30 mg, Oral, BID, Rukhsana Dasilva PA-C, 30 mg at 11/21/24 0813    zolpidem (AMBIEN) tablet 5 mg, 5 mg, Oral, HS PRN, Susannah Mcclain PA-C, 5 mg at 11/20/24 2123    Laboratory Results:  Results from last 7 days   Lab Units 11/25/24  0539 11/24/24  0618 11/23/24  1132 11/23/24  0305 11/23/24  0304 11/22/24  0351 11/21/24  0929 11/20/24  1135 11/19/24  0551   WBC Thousand/uL 6.90 7.16  --  8.37  --  9.62 9.95  --  9.02   HEMOGLOBIN g/dL 8.5* 9.5*  --  9.0*  --  10.2* 11.4*  --  10.7*   HEMATOCRIT % 27.1* 30.8*  --  27.8*  --  31.3* 36.3  --  33.6*   PLATELETS Thousands/uL 270 268  --  227  --  275 256  --  253   SODIUM mmol/L 134* 133* 130*  --  134* 134* 134* 133* 137   POTASSIUM mmol/L 3.7 4.0 4.1  --  3.2* 3.0* 3.7 3.7 3.9   CHLORIDE mmol/L 101 99 95*  --  97 94* 95* 94* 99   CO2 mmol/L 26 26 25  --  27 29 29 30 29   BUN mg/dL 40* 44* 40*  --  38* 31* 24 19 22   CREATININE mg/dL 2.64* 3.40* 3.14*  --  3.10* 1.78* 1.48* 1.36* 1.23   CALCIUM mg/dL 8.7 9.3 9.1  --  8.8 9.1 9.4 9.9 9.1   MAGNESIUM mg/dL 2.3  --   --   --  1.7*  --   --   --  2.0   PHOSPHORUS mg/dL 4.4*  --   --   --  4.5*  --   --   --   --        Medical records have been reviewed through [facility] and care everywhere for this patient encounter    Portions of the record may have been created with voice recognition software. Occasional wrong word or \"sound a like\" substitutions may have occurred due to the inherent limitations of voice recognition " software. Read the chart carefully and recognize,

## 2024-11-25 NOTE — ASSESSMENT & PLAN NOTE
Had large bowel movement 11/18 and has had frequent bouts of stool after this  Had abdominal pain and poor oral intake  CT abdomen: diffuse colonic wall thickening and mucosal hyperenhancement compatible with colitis most prominently involving the descending and sigmoid colon.  Colonic fluid compatible with scattered colonic diverticuli.  The patient has completed treatment with ceftriaxone and metronidazole.  Diarrhea resolved

## 2024-11-25 NOTE — ASSESSMENT & PLAN NOTE
Lab Results   Component Value Date    HGBA1C 7.4 (H) 10/12/2024     Recent Labs     11/24/24  2056 11/25/24  0751 11/25/24  1234 11/25/24  1546   POCGLU 182* 150* 240* 251*   Holding Metformin  Continue insulin sliding scale  Added Premeal insulin at 3 units 3 times daily with meals

## 2024-11-25 NOTE — ASSESSMENT & PLAN NOTE
>>ASSESSMENT AND PLAN FOR AT RISK FOR ACID-BASE IMBALANCE WRITTEN ON 11/25/2024  3:32 PM BY NANY MARIE    Bicarb acceptable  Continue to monitor and treat as needed     >>ASSESSMENT AND PLAN FOR ELECTROLYTE IMBALANCE RISK WRITTEN ON 11/25/2024  3:32 PM BY NANY MARIE    Sodium level okay for now 134.    Slightly low due to lack of free water clearance in the setting of SADAF and tubular dysfunction.  Need to monitor and treat as needed  Recommend fluid restriction and improve glucose control

## 2024-11-25 NOTE — ASSESSMENT & PLAN NOTE
Etiology: Suspect diabetic kidney disease, hypertensive nephrosclerosis, arterionephrosclerosis and age-related nephron loss  Baseline creatinine 1.0-1.4  Patient follows with Dr. Rutherford and was last seen on 9/26/2024  Will need follow-up on discharge   no

## 2024-11-25 NOTE — ASSESSMENT & PLAN NOTE
Home regimen metoprolol succinate 50 mg twice daily  Dosage reduced in the setting of lowish blood pressures  Currently on metoprolol succinate 12.5 mg twice daily-for SBP less than 110

## 2024-11-26 PROBLEM — E87.6 HYPOKALEMIA: Status: RESOLVED | Noted: 2024-11-22 | Resolved: 2024-11-26

## 2024-11-26 PROBLEM — N17.9 ACUTE KIDNEY INJURY SUPERIMPOSED ON STAGE 3A CHRONIC KIDNEY DISEASE (HCC): Status: RESOLVED | Noted: 2024-01-10 | Resolved: 2024-11-26

## 2024-11-26 PROBLEM — N17.9 ACUTE KIDNEY INJURY SUPERIMPOSED ON STAGE 3A CHRONIC KIDNEY DISEASE (HCC): Status: ACTIVE | Noted: 2024-01-10

## 2024-11-26 PROBLEM — K52.9 COLITIS: Status: RESOLVED | Noted: 2024-11-19 | Resolved: 2024-11-26

## 2024-11-26 PROBLEM — N18.31 ACUTE KIDNEY INJURY SUPERIMPOSED ON STAGE 3A CHRONIC KIDNEY DISEASE (HCC): Status: RESOLVED | Noted: 2024-01-10 | Resolved: 2024-11-26

## 2024-11-26 PROBLEM — I50.33 ACUTE ON CHRONIC DIASTOLIC CONGESTIVE HEART FAILURE (HCC): Status: RESOLVED | Noted: 2024-05-22 | Resolved: 2024-11-26

## 2024-11-26 LAB
ANION GAP SERPL CALCULATED.3IONS-SCNC: 4 MMOL/L (ref 4–13)
BUN SERPL-MCNC: 34 MG/DL (ref 5–25)
CALCIUM SERPL-MCNC: 9.4 MG/DL (ref 8.4–10.2)
CHLORIDE SERPL-SCNC: 106 MMOL/L (ref 96–108)
CO2 SERPL-SCNC: 29 MMOL/L (ref 21–32)
CREAT SERPL-MCNC: 1.97 MG/DL (ref 0.6–1.3)
GFR SERPL CREATININE-BSD FRML MDRD: 23 ML/MIN/1.73SQ M
GLUCOSE SERPL-MCNC: 144 MG/DL (ref 65–140)
GLUCOSE SERPL-MCNC: 153 MG/DL (ref 65–140)
GLUCOSE SERPL-MCNC: 154 MG/DL (ref 65–140)
GLUCOSE SERPL-MCNC: 171 MG/DL (ref 65–140)
GLUCOSE SERPL-MCNC: 225 MG/DL (ref 65–140)
IRON SATN MFR SERPL: 14 % (ref 15–50)
IRON SERPL-MCNC: 30 UG/DL (ref 50–212)
PHOSPHATE SERPL-MCNC: 3.8 MG/DL (ref 2.3–4.1)
POTASSIUM SERPL-SCNC: 4.4 MMOL/L (ref 3.5–5.3)
SODIUM SERPL-SCNC: 139 MMOL/L (ref 135–147)
TIBC SERPL-MCNC: 209 UG/DL (ref 250–450)
UIBC SERPL-MCNC: 179 UG/DL (ref 155–355)

## 2024-11-26 PROCEDURE — 99232 SBSQ HOSP IP/OBS MODERATE 35: CPT | Performed by: INTERNAL MEDICINE

## 2024-11-26 PROCEDURE — 80048 BASIC METABOLIC PNL TOTAL CA: CPT | Performed by: NURSE PRACTITIONER

## 2024-11-26 PROCEDURE — 82948 REAGENT STRIP/BLOOD GLUCOSE: CPT

## 2024-11-26 PROCEDURE — 99232 SBSQ HOSP IP/OBS MODERATE 35: CPT

## 2024-11-26 PROCEDURE — 84100 ASSAY OF PHOSPHORUS: CPT | Performed by: NURSE PRACTITIONER

## 2024-11-26 RX ADMIN — HYDROMORPHONE HYDROCHLORIDE 0.2 MG: 0.2 INJECTION, SOLUTION INTRAMUSCULAR; INTRAVENOUS; SUBCUTANEOUS at 19:43

## 2024-11-26 RX ADMIN — SUCRALFATE 1 G: 1 TABLET ORAL at 05:59

## 2024-11-26 RX ADMIN — SUCRALFATE 1 G: 1 TABLET ORAL at 17:53

## 2024-11-26 RX ADMIN — GABAPENTIN 300 MG: 300 CAPSULE ORAL at 22:07

## 2024-11-26 RX ADMIN — HYDROMORPHONE HYDROCHLORIDE 2 MG: 2 TABLET ORAL at 11:58

## 2024-11-26 RX ADMIN — INSULIN LISPRO 1 UNITS: 100 INJECTION, SOLUTION INTRAVENOUS; SUBCUTANEOUS at 17:57

## 2024-11-26 RX ADMIN — PANTOPRAZOLE SODIUM 40 MG: 40 TABLET, DELAYED RELEASE ORAL at 05:59

## 2024-11-26 RX ADMIN — ASPIRIN 81 MG CHEWABLE TABLET 81 MG: 81 TABLET CHEWABLE at 08:53

## 2024-11-26 RX ADMIN — GUAIFENESIN 600 MG: 600 TABLET, EXTENDED RELEASE ORAL at 22:08

## 2024-11-26 RX ADMIN — MELATONIN 3 MG: 3 TAB ORAL at 22:09

## 2024-11-26 RX ADMIN — ACETAMINOPHEN 975 MG: 325 TABLET, FILM COATED ORAL at 13:26

## 2024-11-26 RX ADMIN — HEPARIN SODIUM 5000 UNITS: 5000 INJECTION INTRAVENOUS; SUBCUTANEOUS at 13:26

## 2024-11-26 RX ADMIN — Medication: at 02:57

## 2024-11-26 RX ADMIN — ACETAMINOPHEN 975 MG: 325 TABLET, FILM COATED ORAL at 06:00

## 2024-11-26 RX ADMIN — HYDROMORPHONE HYDROCHLORIDE 0.2 MG: 0.2 INJECTION, SOLUTION INTRAMUSCULAR; INTRAVENOUS; SUBCUTANEOUS at 03:06

## 2024-11-26 RX ADMIN — LEVOTHYROXINE SODIUM 37.5 MCG: 75 TABLET ORAL at 05:59

## 2024-11-26 RX ADMIN — ACETAMINOPHEN 975 MG: 325 TABLET, FILM COATED ORAL at 22:08

## 2024-11-26 RX ADMIN — HYDROMORPHONE HYDROCHLORIDE 2 MG: 2 TABLET ORAL at 08:53

## 2024-11-26 RX ADMIN — HYDROMORPHONE HYDROCHLORIDE 2 MG: 2 TABLET ORAL at 22:07

## 2024-11-26 RX ADMIN — ATORVASTATIN CALCIUM 40 MG: 40 TABLET, FILM COATED ORAL at 17:53

## 2024-11-26 RX ADMIN — INSULIN LISPRO 1 UNITS: 100 INJECTION, SOLUTION INTRAVENOUS; SUBCUTANEOUS at 11:58

## 2024-11-26 RX ADMIN — METOPROLOL SUCCINATE 12.5 MG: 25 TABLET, EXTENDED RELEASE ORAL at 08:53

## 2024-11-26 RX ADMIN — SALINE NASAL SPRAY 2 SPRAY: 1.5 SOLUTION NASAL at 08:53

## 2024-11-26 RX ADMIN — HYDROMORPHONE HYDROCHLORIDE 2 MG: 2 TABLET ORAL at 17:53

## 2024-11-26 RX ADMIN — GUAIFENESIN 600 MG: 600 TABLET, EXTENDED RELEASE ORAL at 08:53

## 2024-11-26 RX ADMIN — HEPARIN SODIUM 5000 UNITS: 5000 INJECTION INTRAVENOUS; SUBCUTANEOUS at 06:00

## 2024-11-26 RX ADMIN — INSULIN LISPRO 2 UNITS: 100 INJECTION, SOLUTION INTRAVENOUS; SUBCUTANEOUS at 22:07

## 2024-11-26 RX ADMIN — HEPARIN SODIUM 5000 UNITS: 5000 INJECTION INTRAVENOUS; SUBCUTANEOUS at 22:08

## 2024-11-26 NOTE — PROGRESS NOTES
Progress Note - Cardiology   Name: Mattie Varela 79 y.o. female I MRN: [id number]  Unit/Bed#: E4 -01 I Date of Admission: 11/17/2024   Date of Service: 11/26/2024 I Hospital Day: 9  Assessment & Plan  Acute on chronic diastolic congestive heart failure (HCC) (Resolved: 11/26/2024)  HFpEF, LVEF 51%, LVIDd 4.1 cm (May 2024), NYHA Class III, AHA Stage C, etiology is NAVYA  Neurohormonal Blockade:              -- beta blocker: Toprol-XL 12.5 mg BID  -- ACE/ARB/ARNi: none  -- aldosterone antagonist: none   -- SGLT2: Not affordable.  -- Home diuretic: torsemide 30 mg daily  -- IP diuretic: None    Examines euvolemic today. She is essentially bed bound due to chronic pain so we are unable to get standing weight. Bed weight today 187#.  Hypertension  Home Toprol was decreased from 50 mg BID to 12.5 mg BID to avoid hypotension  Normotensive today.  Acute kidney injury superimposed on stage 3a chronic kidney disease (HCC) (Resolved: 11/26/2024)  Nephrology consulted  Continue to hold diuretic.  AM BMP    Subjective   Patient reports her main problem is continued back pain from the middle of her upper back down to the lumbar spine which worsens in severity with any movement. Left knee and right lower leg also painful. This limits any movement even rolling in bed. Same as yesterday despite change in analgesia from SLIM. Pt denies SOB or chest pain. Pt still making adequate urine via ahn.    Objective :  Temp:  [96.7 °F (35.9 °C)-99.9 °F (37.7 °C)] 96.9 °F (36.1 °C)  HR:  [94] 94  BP: (125-139)/(68-75) 137/68  Resp:  [16] 16  SpO2:  [95 %-98 %] 95 %  O2 Device: None (Room air)    Vitals:    11/25/24 0600 11/26/24 0600   Weight: 84.2 kg (185 lb 10 oz) 85.1 kg (187 lb 9.8 oz)     Physical Exam  Vitals and nursing note reviewed.   HENT:      Head: Normocephalic and atraumatic.      Nose: No congestion.      Mouth/Throat:      Mouth: Mucous membranes are dry.   Eyes:      Conjunctiva/sclera: Conjunctivae normal.   Neck:       Comments: - jvd  Cardiovascular:      Rate and Rhythm: Normal rate and regular rhythm.      Heart sounds: S1 normal and S2 normal. Murmur heard.      Systolic murmur is present with a grade of 2/6.   Pulmonary:      Effort: Pulmonary effort is normal.      Breath sounds: No wheezing, rhonchi or rales.   Abdominal:      Palpations: Abdomen is soft.   Genitourinary:     Comments: Bateman with clear yellow urine in tubing  Musculoskeletal:      Comments: Limited rom from pain essentially frozen in bed she is assist x 2 even for rolling    Bl ue with very mild non pitting edema     Bl le no pitting edema   Skin:     General: Skin is warm and dry.   Neurological:      Mental Status: She is alert and oriented to person, place, and time.   Psychiatric:         Behavior: Behavior normal.     Lab Results: I have reviewed the following results:  BMP from last 4 days reviewed  Other studies: ECHO from 11/22/24 showed LV size normal, LVEF 51%.  Septum is akinetic or paradoxical (known LBBB) unable to assess diastolic function.  Moderate MAC.  Echo: May 2024 LVEF 70%, grade I diastolic dysfunction, mild LVOT obstruction, moderate annular calcification, mild TR     Kylie Sommers PA-C

## 2024-11-26 NOTE — ASSESSMENT & PLAN NOTE
Nephrology consulted  Continue to hold diuretic.  AM BMP   Start   Ordered   05/14/22 2300  IP CONSULT TO PHARMACY - VANCOMYCIN DOSING  ONE TIME     Complete     Comments: Pharmacy will order the necessary lab work, if needed, to complete the dosing and ongoing monitoring of requested drug therapy. Details will be updated within the patients Progress Notes. Ordering Provider: Tara Ortega DO   Authorizing Provider: Tara Ortega DO   Question: Antibiotic Indications Answer: Skin and Soft Tissue Infection         Entered initial dose of Vancomycin 1250mg.  Pharmacy to follow

## 2024-11-26 NOTE — ASSESSMENT & PLAN NOTE
Blood pressure overall is stable  Patient did have some hypotension 11/23/2024  On beta-blocker.  Metoprolol XL  Avoid hypotension

## 2024-11-26 NOTE — ASSESSMENT & PLAN NOTE
Lab Results   Component Value Date    HGBA1C 7.4 (H) 10/12/2024     Recent Labs     11/25/24  2035 11/26/24  0833 11/26/24  1127 11/26/24  1546   POCGLU 201* 144* 171* 153*   Holding Metformin  Continue insulin sliding scale  Added Premeal insulin at 3 units 3 times daily with meals

## 2024-11-26 NOTE — PROGRESS NOTES
NEPHROLOGY PROGRESS NOTE   Mattie Varela 79 y.o. female MRN: [id number]  Unit/Bed#: E4 -01 Encounter: [id number]      ASSESSMENT & PLAN:  Assessment & Plan  SADAF (acute kidney injury) (HCC)  Etiology: Multifactorial: In the setting of diuresis/intravascular depletion with GI loss/contrast exposure on 11/21/2021 leading to SADAF  Initially admitted for decompensated heart failure treated with IV diuretics transition to oral diuretics currently off  Admission creatinine 1.23  Creatinine 3.14 in 11/23/2024  Kidney function improving with creatinine down to 1.97  Status post albumin  Okay to resume diuretics as per cardiology before discharge  Workup  CT imaging with kidneys unremarkable no hydronephrosis  Urinalysis 11/17 with innumerable WBC  Plan:  Kidney function improving after holding diuretics, creatinine down to 1.97  Void relative hypotension.  Okay to resume diuretics as per cardiology before discharge  Hypertension  Blood pressure overall is stable  Patient did have some hypotension 11/23/2024  On beta-blocker.  Metoprolol XL  Avoid hypotension  Chronic pain disorder  Per primary team-may be contributing to low sodium  Hypothyroidism  Management per  primary team  Urinary retention  History of urinary retention, spine stimulator in place   Bateman catheter care per primary team  Of note, was recently treated in October for Klebsiella cystitis  Chest pain  Per cardiology and primary team  No PE on CT scan  Type 2 diabetes mellitus with other skin complications (HCC)  Lab Results   Component Value Date    HGBA1C 7.4 (H) 10/12/2024           PLAN SUMMARY:  Kidney function improving.  Avoid relative hypotension.  Okay to resume diuretics as per cardiology.  Recommendation was discussed with internal medicine attending    SUBJECTIVE:  Patient seen and examined, complaining of some heartburn as well as constipation, denies significant chest pain or shortness of breath, no nausea vomiting    OBJECTIVE:  Current  Weight: Weight - Scale: 85.1 kg (187 lb 9.8 oz)  Vitals:    11/26/24 0835   BP: 137/68   Pulse: 94   Resp: 16   Temp: (!) 96.9 °F (36.1 °C)   SpO2: 95%       Intake/Output Summary (Last 24 hours) at 11/26/2024 1404  Last data filed at 11/26/2024 0155  Gross per 24 hour   Intake --   Output 1550 ml   Net -1550 ml       General: Chronically ill-appearing, cooperative, in not acute distress  Eyes: conjunctivae pale, anicteric sclerae  ENT: lips and mucous membranes moist  Neck: supple, no JVD  Chest: clear breath sounds bilateral, no crackles, ronchus or wheezings  CVS: distinct S1 & S2, normal rate, regular rhythm  Abdomen: soft, non-tender, non-distended, normoactive bowel sounds  Extremities: no significant edema of both legs  Skin: no rash  Neuro: awake, alert, interactive      Medications:    Current Facility-Administered Medications:     acetaminophen (TYLENOL) tablet 975 mg, 975 mg, Oral, Q8H ARY, Susannah Mcclain PA-C, 975 mg at 11/26/24 1326    albuterol (PROVENTIL HFA,VENTOLIN HFA) inhaler 2 puff, 2 puff, Inhalation, Q6H PRN, Paco Carmona MD, 2 puff at 11/1943    aspirin chewable tablet 81 mg, 81 mg, Oral, Daily, Paco Carmona MD, 81 mg at 11/26/24 0853    atorvastatin (LIPITOR) tablet 40 mg, 40 mg, Oral, Daily With Dinner, Paco Carmona MD, 40 mg at 11/25/24 1740    gabapentin (NEURONTIN) capsule 300 mg, 300 mg, Oral, HS, Khurram Ceron MD, 300 mg at 11/25/24 2141    guaiFENesin (MUCINEX) 12 hr tablet 600 mg, 600 mg, Oral, Q12H ARYBi PA-C, 600 mg at 11/26/24 0853    heparin (porcine) subcutaneous injection 5,000 Units, 5,000 Units, Subcutaneous, Q8H Susannah MCALLISTER PA-C, 5,000 Units at 11/26/24 1326    HYDROmorphone (DILAUDID) tablet 2 mg, 2 mg, Oral, 4x Daily, Khurram Ceron MD, 2 mg at 11/26/24 1158    HYDROmorphone HCl (DILAUDID) injection 0.2 mg, 0.2 mg, Intravenous, Q4H PRN, Khurram Ceron MD, 0.2 mg at 11/26/24 0305    insulin lispro (HumALOG/ADMELOG) 100 units/mL subcutaneous  injection 1-6 Units, 1-6 Units, Subcutaneous, 4x Daily (AC & HS), 1 Units at 11/26/24 1158 **AND** Fingerstick Glucose (POCT), , , 4x Daily AC and at bedtime, NANY Miller    latanoprost (XALATAN) 0.005 % ophthalmic solution 1 drop, 1 drop, Both Eyes, HS, Paco Carmona MD, 1 drop at 11/22/24 2125    levalbuterol (XOPENEX) inhalation solution 1.25 mg, 1.25 mg, Nebulization, Q6H PRN, Perlita Torres MD    levothyroxine tablet 37.5 mcg, 37.5 mcg, Oral, Early Morning, Paco Carmona MD, 37.5 mcg at 11/26/24 0559    lidocaine (LIDODERM) 5 % patch 1 patch, 1 patch, Topical, Daily, Perlita Torres MD, 1 patch at 11/23/24 0903    melatonin tablet 3 mg, 3 mg, Oral, HS PRN, Bi Luke PA-C, 3 mg at 11/25/24 2151    metoprolol succinate (TOPROL-XL) 24 hr tablet 12.5 mg, 12.5 mg, Oral, Daily, Jackson Morris MD, 12.5 mg at 11/26/24 0853    pantoprazole (PROTONIX) EC tablet 40 mg, 40 mg, Oral, Early Morning, Paco Carmona MD, 40 mg at 11/26/24 0559    sodium chloride (OCEAN) 0.65 % nasal spray 2 spray, 2 spray, Each Nare, BID, Khurram Ceron MD, 2 spray at 11/26/24 0853    sucralfate (CARAFATE) tablet 1 g, 1 g, Oral, BID AC, Krystina Preston PA-C, 1 g at 11/26/24 0559    [Held by provider] torsemide (DEMADEX) tablet 30 mg, 30 mg, Oral, BID, Rukhsana Dasilva PA-C, 30 mg at 11/21/24 0813    white petrolatum-mineral oil (EUCERIN,HYDROCERIN) cream, , Topical, TID PRN, Joseph Carrasco PA-C, Given at 11/26/24 0257    Invasive Devices:   Urethral Catheter 16 Fr. (Active)   Amt returned on insertion(mL) 450 mL 11/25/24 0559   Reasons to continue Urinary Catheter  Acute urinary retention/obstruction failing urinary retention protocol 11/26/24 0801   Goal for Removal No longer needed- Will place order to discontinue 11/26/24 0801   Site Assessment Clean;Skin intact 11/26/24 0801   Bateman Care Done 11/26/24 0801   Collection Container Standard drainage bag 11/26/24 0801   Securement Method  "Securing device (Describe) 11/26/24 0801   Output (mL) 650 mL 11/26/24 0155       Lab Results:   Results from last 7 days   Lab Units 11/26/24  1246 11/25/24  0539 11/24/24  0618 11/23/24  1132 11/23/24  0305 11/23/24  0304 11/22/24  0351   WBC Thousand/uL  --  6.90 7.16  --  8.37  --  9.62   HEMOGLOBIN g/dL  --  8.5* 9.5*  --  9.0*  --  10.2*   HEMATOCRIT %  --  27.1* 30.8*  --  27.8*  --  31.3*   PLATELETS Thousands/uL  --  270 268  --  227  --  275   SODIUM mmol/L 139 134* 133*   < >  --  134* 134*   POTASSIUM mmol/L 4.4 3.7 4.0   < >  --  3.2* 3.0*   CHLORIDE mmol/L 106 101 99   < >  --  97 94*   CO2 mmol/L 29 26 26   < >  --  27 29   BUN mg/dL 34* 40* 44*   < >  --  38* 31*   CREATININE mg/dL 1.97* 2.64* 3.40*   < >  --  3.10* 1.78*   CALCIUM mg/dL 9.4 8.7 9.3   < >  --  8.8 9.1   MAGNESIUM mg/dL  --  2.3  --   --   --  1.7*  --    PHOSPHORUS mg/dL 3.8 4.4*  --   --   --  4.5*  --    ALK PHOS U/L  --   --  76  --   --  74 70   ALT U/L  --   --  5*  --   --  5* 6*   AST U/L  --   --  13  --   --  12* 15    < > = values in this interval not displayed.         Portions of the record may have been created with voice recognition software. Occasional wrong word or \"sound a like\" substitutions may have occurred due to the inherent limitations of voice recognition software. Read the chart carefully and recognize, using context, where substitutions have occurred.If you have any questions, please contact the dictating provider.  "

## 2024-11-26 NOTE — PROGRESS NOTES
Progress Note - Hospitalist   Name: Mattie Varela 79 y.o. female I MRN: [id number]  Unit/Bed#: E4 -01 I Date of Admission: 11/17/2024   Date of Service: 11/26/2024 I Hospital Day: 9    Assessment & Plan  Chest pain  Complained of chest pain with inspiration  Obtained EKG- without signs of acute ischemia, Trop was 22  Chest x-ray: without pneumonia, effusion, pneumothorax  Patient with risk factors for PE of immobility, recent fracture -PE ruled out with CTA chest  Noted heavy atherosclerotic coronary artery calcification  Last cath was in 2023 with known disease that was treated with medical management  Discussed with cardiology and ongoing medical management recommended  Patella fracture  [facility]p [facility] fall at [facility] living  CT left knee showing what appears to be a fracture of her bipartite patella   Seen by orthopedic team  Knee immobilizer x 2 weeks with WBAT to LLE   Office follow-up in 2 weeks with repeat x-rays   PT OT recommending moderate resource intensity-level 2    Type 2 diabetes mellitus with other skin complications (HCC)  Lab Results   Component Value Date    HGBA1C 7.4 (H) 10/12/2024     Recent Labs     11/25/24  2035 11/26/24  0833 11/26/24  1127 11/26/24  1546   POCGLU 201* 144* 171* 153*   Holding Metformin  Continue insulin sliding scale  Added Premeal insulin at 3 units 3 times daily with meals  Urinary retention  Had overactive bladder in the past and received Botox.   Continues to have urinary retention requiring ahn catheter placement. For outpatient voiding trial.  Void trial should occur with urology in the outpt setting  UA >100,000  cfu GNR. Was recently treated for Klebsiella cystitis  Without symptoms, fever or leukocytosis & chronic ahn, monitor off abx  Chronic pain disorder  History of chronic pain with degenerative spine and history of spinal stimulator in place (although does not work)  On pain medication in the outpatient setting  PDMP queried- no red flags  The patient has not  found oxycodone helpful.  She will be started on oral hydromorphone.  Muscle relaxants and other sedating drugs will be stopped.  Gabapentin will be increased somewhat.  Hypertension  Home regimen metoprolol succinate 50 mg twice daily  Dosage reduced in the setting of lowish blood pressures  Currently on metoprolol succinate 12.5 mg twice daily-for SBP less than 110  Hypothyroidism  Continue levothyroxine  Insomnia  Zolpidem has been stopped since the patient has been started on hydromorphone.  Degenerative lumbar spinal stenosis    SADAF (acute kidney injury) (HCC)  Patient's diuretics are on hold.  Her creatinine has improved.  Will continue to monitor this.    The patient's heart failure has improved.  Diuretics remain on hold.  Acute kidney injury appears to be resolving.  It is likely that her diuretic therapy will be restarted tomorrow.  The patient continues to have substantial pain.  She was started on scheduled hydromorphone yesterday.  Gabapentin was also increased somewhat.  I did not change her medication today.  Her current therapy has not yet achieved a steady state.    Subjective:  The patient denies chest pain or shortness of breath.  She has been able to eat.  She continues to have severe back and leg pain.  She is tolerating hydromorphone.  She says that this has been somewhat better than oxycodone.    Physical Exam:   Temp:  [96.7 °F (35.9 °C)-99.9 °F (37.7 °C)] 97.8 °F (36.6 °C)  HR:  [88-94] 88  BP: (130-139)/(68-73) 130/72  Resp:  [16-18] 18  SpO2:  [95 %-100 %] 100 %  O2 Device: None (Room air)    Gen: Well-developed, obese, in no distress.  Neck: Supple.  No lymphadenopathy or goiter.  Heart: Regular rhythm.  I heard no murmur, gallop, or rub.  Lungs: Clear to auscultation and percussion.  No wheezing, rales, or rhonchi.  Abd: Soft with active bowel sounds.  No mass, tenderness, organomegaly.  Extremities: No clubbing, cyanosis, or edema.  Neuro: Alert and oriented.  No focal sign.  Skin:  Dry.      LABS:   CMP:   Lab Results   Component Value Date    SODIUM 139 11/26/2024    K 4.4 11/26/2024     11/26/2024    CO2 29 11/26/2024    BUN 34 (H) 11/26/2024    CREATININE 1.97 (H) 11/26/2024    CALCIUM 9.4 11/26/2024    EGFR 23 11/26/2024             VTE Pharmacologic Prophylaxis: Heparin  VTE Mechanical Prophylaxis: reason for no mechanical VTE prophylaxis leg pain.

## 2024-11-26 NOTE — ASSESSMENT & PLAN NOTE
Etiology: Multifactorial: In the setting of diuresis/intravascular depletion with GI loss/contrast exposure on 11/21/2021 leading to SADAF  Initially admitted for decompensated heart failure treated with IV diuretics transition to oral diuretics currently off  Admission creatinine 1.23  Creatinine 3.14 in 11/23/2024  Kidney function improving with creatinine down to 1.97  Status post albumin  Okay to resume diuretics as per cardiology before discharge  Workup  CT imaging with kidneys unremarkable no hydronephrosis  Urinalysis 11/17 with innumerable WBC  Plan:  Kidney function improving after holding diuretics, creatinine down to 1.97  Void relative hypotension.  Okay to resume diuretics as per cardiology before discharge

## 2024-11-26 NOTE — ASSESSMENT & PLAN NOTE
HFpEF, LVEF 51%, LVIDd 4.1 cm (May 2024), NYHA Class III, AHA Stage C, etiology is NAVYA  Neurohormonal Blockade:              -- beta blocker: Toprol-XL 12.5 mg BID  -- ACE/ARB/ARNi: none  -- aldosterone antagonist: none   -- SGLT2: Not affordable.  -- Home diuretic: torsemide 30 mg daily  -- IP diuretic: None    Examines euvolemic today. She is essentially bed bound due to chronic pain so we are unable to get standing weight. Bed weight today 187#.

## 2024-11-27 LAB
ANION GAP SERPL CALCULATED.3IONS-SCNC: 6 MMOL/L (ref 4–13)
BUN SERPL-MCNC: 28 MG/DL (ref 5–25)
CALCIUM SERPL-MCNC: 9.1 MG/DL (ref 8.4–10.2)
CHLORIDE SERPL-SCNC: 106 MMOL/L (ref 96–108)
CO2 SERPL-SCNC: 25 MMOL/L (ref 21–32)
CREAT SERPL-MCNC: 1.57 MG/DL (ref 0.6–1.3)
GFR SERPL CREATININE-BSD FRML MDRD: 30 ML/MIN/1.73SQ M
GLUCOSE SERPL-MCNC: 153 MG/DL (ref 65–140)
GLUCOSE SERPL-MCNC: 167 MG/DL (ref 65–140)
GLUCOSE SERPL-MCNC: 183 MG/DL (ref 65–140)
GLUCOSE SERPL-MCNC: 195 MG/DL (ref 65–140)
GLUCOSE SERPL-MCNC: 197 MG/DL (ref 65–140)
POTASSIUM SERPL-SCNC: 4.2 MMOL/L (ref 3.5–5.3)
SODIUM SERPL-SCNC: 137 MMOL/L (ref 135–147)

## 2024-11-27 PROCEDURE — 80048 BASIC METABOLIC PNL TOTAL CA: CPT | Performed by: INTERNAL MEDICINE

## 2024-11-27 PROCEDURE — 99232 SBSQ HOSP IP/OBS MODERATE 35: CPT

## 2024-11-27 PROCEDURE — 99232 SBSQ HOSP IP/OBS MODERATE 35: CPT | Performed by: INTERNAL MEDICINE

## 2024-11-27 PROCEDURE — 82948 REAGENT STRIP/BLOOD GLUCOSE: CPT

## 2024-11-27 PROCEDURE — 97530 THERAPEUTIC ACTIVITIES: CPT

## 2024-11-27 PROCEDURE — 97110 THERAPEUTIC EXERCISES: CPT

## 2024-11-27 RX ORDER — METHOCARBAMOL 500 MG/1
250 TABLET, FILM COATED ORAL EVERY 8 HOURS PRN
Status: DISCONTINUED | OUTPATIENT
Start: 2024-11-27 | End: 2024-12-02 | Stop reason: HOSPADM

## 2024-11-27 RX ORDER — MAGNESIUM HYDROXIDE/ALUMINUM HYDROXICE/SIMETHICONE 120; 1200; 1200 MG/30ML; MG/30ML; MG/30ML
30 SUSPENSION ORAL ONCE
Status: COMPLETED | OUTPATIENT
Start: 2024-11-27 | End: 2024-11-27

## 2024-11-27 RX ADMIN — LEVOTHYROXINE SODIUM 37.5 MCG: 75 TABLET ORAL at 05:58

## 2024-11-27 RX ADMIN — HEPARIN SODIUM 5000 UNITS: 5000 INJECTION INTRAVENOUS; SUBCUTANEOUS at 14:24

## 2024-11-27 RX ADMIN — HYDROMORPHONE HYDROCHLORIDE 2 MG: 2 TABLET ORAL at 12:30

## 2024-11-27 RX ADMIN — HYDROMORPHONE HYDROCHLORIDE 2 MG: 2 TABLET ORAL at 22:17

## 2024-11-27 RX ADMIN — INSULIN LISPRO 1 UNITS: 100 INJECTION, SOLUTION INTRAVENOUS; SUBCUTANEOUS at 22:18

## 2024-11-27 RX ADMIN — METHOCARBAMOL 250 MG: 500 TABLET ORAL at 22:17

## 2024-11-27 RX ADMIN — ACETAMINOPHEN 975 MG: 325 TABLET, FILM COATED ORAL at 22:14

## 2024-11-27 RX ADMIN — ATORVASTATIN CALCIUM 40 MG: 40 TABLET, FILM COATED ORAL at 17:03

## 2024-11-27 RX ADMIN — TORSEMIDE 30 MG: 10 TABLET ORAL at 22:15

## 2024-11-27 RX ADMIN — PANTOPRAZOLE SODIUM 40 MG: 40 TABLET, DELAYED RELEASE ORAL at 05:58

## 2024-11-27 RX ADMIN — SUCRALFATE 1 G: 1 TABLET ORAL at 17:05

## 2024-11-27 RX ADMIN — METHOCARBAMOL 250 MG: 500 TABLET ORAL at 14:29

## 2024-11-27 RX ADMIN — GUAIFENESIN 600 MG: 600 TABLET, EXTENDED RELEASE ORAL at 08:53

## 2024-11-27 RX ADMIN — HYDROMORPHONE HYDROCHLORIDE 2 MG: 2 TABLET ORAL at 08:53

## 2024-11-27 RX ADMIN — GABAPENTIN 300 MG: 300 CAPSULE ORAL at 22:15

## 2024-11-27 RX ADMIN — METOPROLOL SUCCINATE 12.5 MG: 25 TABLET, EXTENDED RELEASE ORAL at 08:53

## 2024-11-27 RX ADMIN — INSULIN LISPRO 1 UNITS: 100 INJECTION, SOLUTION INTRAVENOUS; SUBCUTANEOUS at 08:54

## 2024-11-27 RX ADMIN — INSULIN LISPRO 1 UNITS: 100 INJECTION, SOLUTION INTRAVENOUS; SUBCUTANEOUS at 17:03

## 2024-11-27 RX ADMIN — SUCRALFATE 1 G: 1 TABLET ORAL at 05:58

## 2024-11-27 RX ADMIN — ACETAMINOPHEN 975 MG: 325 TABLET, FILM COATED ORAL at 05:58

## 2024-11-27 RX ADMIN — INSULIN LISPRO 2 UNITS: 100 INJECTION, SOLUTION INTRAVENOUS; SUBCUTANEOUS at 12:27

## 2024-11-27 RX ADMIN — HYDROMORPHONE HYDROCHLORIDE 2 MG: 2 TABLET ORAL at 17:03

## 2024-11-27 RX ADMIN — ACETAMINOPHEN 975 MG: 325 TABLET, FILM COATED ORAL at 14:21

## 2024-11-27 RX ADMIN — LIDOCAINE 1 PATCH: 50 PATCH TOPICAL at 08:56

## 2024-11-27 RX ADMIN — HEPARIN SODIUM 5000 UNITS: 5000 INJECTION INTRAVENOUS; SUBCUTANEOUS at 22:18

## 2024-11-27 RX ADMIN — ASPIRIN 81 MG CHEWABLE TABLET 81 MG: 81 TABLET CHEWABLE at 08:53

## 2024-11-27 RX ADMIN — HYDROMORPHONE HYDROCHLORIDE 0.2 MG: 0.2 INJECTION, SOLUTION INTRAMUSCULAR; INTRAVENOUS; SUBCUTANEOUS at 19:23

## 2024-11-27 RX ADMIN — HEPARIN SODIUM 5000 UNITS: 5000 INJECTION INTRAVENOUS; SUBCUTANEOUS at 05:58

## 2024-11-27 RX ADMIN — MELATONIN 3 MG: 3 TAB ORAL at 22:17

## 2024-11-27 RX ADMIN — ALUMINUM HYDROXIDE, MAGNESIUM HYDROXIDE, AND DIMETHICONE 30 ML: 200; 20; 200 SUSPENSION ORAL at 14:24

## 2024-11-27 RX ADMIN — GUAIFENESIN 600 MG: 600 TABLET, EXTENDED RELEASE ORAL at 22:15

## 2024-11-27 NOTE — CASE MANAGEMENT
Case Management Discharge Planning Note    Patient name Mattie Varela  Location [facility] /E4 -* MRN [id number]  : 1945 Date 2024       Current Admission Date: 2024  Current Admission Diagnosis:Acute on chronic diastolic congestive heart failure (HCC)   Patient Active Problem List    Diagnosis Date Noted Date Diagnosed    SADAF (acute kidney injury) ([facility]) 2024     Insomnia 2024     Patella fracture 2024     Pain in both lower extremities 10/30/2024     Chronic heart failure with preserved ejection fraction (HFpEF) ([facility]) 10/25/2024     Acute kidney injury superimposed on chronic kidney disease  ([facility]) 10/18/2024     Acute low back pain 10/13/2024     Chest pain 10/11/2024     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     Generalized edema 2024     Constipation 2024     Leg pain, right 2024     Acute on chronic diastolic congestive heart failure ([facility]) 2024     s/p [employer] loop recorder 3/2/2024 2024     Moderate protein-calorie malnutrition ([facility]) 2024     Hypertensive urgency 2024     AMS (altered mental status) 2024     Encounter for examination for admission to nursing home 2024     Left ventricular outflow obstruction 2024     Obesity, Class I, BMI 30-34.9 2024     Urinary retention 2023     Exertional shortness of breath 2023     Non obstructive CAD 11/15/2023     History of lumbar surgery 11/10/2023     Abnormal urinalysis 2023     Chronic obstructive pulmonary disease, unspecified COPD type ([facility]) 2023     Confusion with body shakes and blank stare 2023     Anemia in stage 3a chronic kidney disease  ([facility]) 2023     Lower extremity edema 2023     Cerebrovascular accident (CVA), unspecified mechanism ([facility]) 2022     Prepyloric ulcer 2021     Diarrhea 2021     Mild intermittent asthma without complication 2020     S/P insertion of spinal  "cord stimulator 07/18/2018     Iron deficiency anemia secondary to inadequate dietary iron intake 06/19/2018     Type 2 diabetes mellitus with other skin complications (HCC)      Failed back surgical syndrome 03/16/2018     Hypothyroidism 11/20/2017     Degenerative lumbar spinal stenosis 01/25/2017     Glaucoma 01/06/2017     Overactive bladder 08/04/2016     Dyspepsia 02/09/2016     Hypercholesterolemia 02/09/2016     Anxiety 02/09/2015     Chronic pain disorder 12/18/2013     Hypertension 06/12/2013       LOS (days): 10  Geometric Mean LOS (GMLOS) (days): 3.9  Days to GMLOS:-6.3     OBJECTIVE:  Risk of Unplanned Readmission Score: 48.95         Current admission status: Inpatient   Preferred Pharmacy:   [facility] [address]  [address]  [address] [address]  Phone: [phone] Fax: [phone]    [facility] [place], [place] - [address]  [facility],  [address]  [facility],  First Floor [facility] [address]  Phone: [phone] Fax: [phone]    [facility] - [place], [place] - [address]  [address]  [place] [address]  Phone: [phone] Fax: [phone]    Primary Care Provider: NANY Pollock    Primary Insurance: [facility]  Secondary Insurance:     DISCHARGE DETAILS:                                                                                                 Additional Comments: CM had with the pt.  She stated that she did speak to her dtr last week about STR.  She does realize at this time she cannot return to [facility] without going to rehab first.  she stated that she only has a couple of choices and only agrees to go to [facility].  she said, \"They have the better rehab, but I do not want to go there on the weekend because the last time I admitted on a weekend and I sat there for three before I got the right medications and therapy.\"  Pt was reassured that CM would notify [facility] about her " concerns.  Reserved in [facility] and they were informed of her concerns.  Will need to verify bed availbility and medical clearance.  Pt still has a lot of pain.  Dilaudid increased and Robaxin addded today.  MD made aware of rehab decision and checking for clearance.  CM to follow.

## 2024-11-27 NOTE — ASSESSMENT & PLAN NOTE
HFpEF, LVEF 51%, LVIDd 4.1 cm (May 2024), NYHA Class III, AHA Stage C, etiology is NAVYA  Neurohormonal Blockade:              -- beta blocker: Toprol-XL 12.5 mg BID  -- ACE/ARB/ARNi: none  -- aldosterone antagonist: none   -- SGLT2: Not affordable.  -- Home diuretic: torsemide 30 mg daily  -- IP diuretic: None    Examines euvolemic .   Will restart torsemide today 30 mg by mouth twice a day

## 2024-11-27 NOTE — PLAN OF CARE
Problem: OCCUPATIONAL THERAPY ADULT  Goal: Performs self-care activities at highest level of function for planned discharge setting.  See evaluation for individualized goals.  Description: Treatment Interventions: ADL retraining, UE strengthening/ROM, Functional transfer training, Endurance training, Cognitive reorientation, Patient/family training, Equipment evaluation/education, Neuromuscular reeducation, Compensatory technique education, Continued evaluation, Energy conservation, Activityengagement          See flowsheet documentation for full assessment, interventions and recommendations.   Outcome: Not Progressing  Note: Limitation: Decreased ADL status, Decreased UE strength, Decreased Safe judgement during ADL, Decreased endurance, Decreased self-care trans, Decreased high-level ADLs (dec balance, functional reach, coordination)  Prognosis: Fair  Assessment: Pt seen for OT f/u treatment session focusing on functional activity tolerance, bed mobility, ADLs, functional transfers/mobility, and Safe transfer techniques. Upon OT arrival, pt was found seated EOB, agreeable to OT tx session. Pt continues to regress w therapy interventions. Please refer to flowsheet for functional performance. Patient requiring verbal cues for safety and verbal cues for pacing through activity steps.  Patient continues to be functioning below baseline level, occupational performance remains limited secondary to factors listed above and increased risk for falls and injury. Pt lying supine with bed alarm activated at end of session. Call bell and phone within reach. All needs met and pt reports no further questions for OT at this time. Currently, pt is recommended for level 2 resource intensity at time of d/c. Given pt's CLOF, will reduce POC to 2-3x/wk.     Rehab Resource Intensity Level, OT: II (Moderate Resource Intensity)

## 2024-11-27 NOTE — ASSESSMENT & PLAN NOTE
- A1c 10/12/24: 7.4  - Home meds metformin 500 mg PO BID-hold while inpatient.  Continue with Humalog insulin

## 2024-11-27 NOTE — ASSESSMENT & PLAN NOTE
Etiology: Multifactorial: In the setting of diuresis/intravascular depletion with GI loss/contrast exposure on 11/21/2021 leading to SADAF  Initially admitted for decompensated heart failure treated with IV diuretics transition to oral diuretics currently off  Admission creatinine 1.23  Creatinine 3.14 in 11/23/2024  Her kidney function was improving with creatinine down to 1.97 yesterday  Status post albumin  Her labs this morning  Workup  CT imaging with kidneys unremarkable no hydronephrosis  Urinalysis 11/17 with innumerable WBC  Plan:  Follow BMP this morning, if renal function continues to improve okay to resume diuretics as per cardiology  Avoid relative hypotension.  Follow daily labs

## 2024-11-27 NOTE — ASSESSMENT & PLAN NOTE
Lab Results   Component Value Date    HGBA1C 7.4 (H) 10/12/2024     Management as per primary team

## 2024-11-27 NOTE — OCCUPATIONAL THERAPY NOTE
"  Occupational Therapy Progress Note     Patient Name: Mattie Varela  Today's Date: 11/27/2024  Problem List  Principal Problem:    Acute on chronic diastolic congestive heart failure ([facility])  Active Problems:    Degenerative lumbar spinal stenosis    Type 2 diabetes mellitus with other skin complications ([facility])    Chronic pain disorder    Hypertension    Hypothyroidism    Urinary retention    Chest pain    Patella fracture    Insomnia    SADAF (acute kidney injury) ([facility])       11/27/24 1422   OT Last Visit   OT Visit Date 11/27/24   Note Type   Note Type Treatment   Pain Assessment   Pain Assessment Tool 0-10   Pain Score 10 - Worst Possible Pain   Pain Location/Orientation Location: Back   Hospital Pain Intervention(s) Repositioned;Ambulation/increased activity   Restrictions/Precautions   LLE Weight Bearing Per Order WBAT   Braces or Orthoses Knee immobilizer   Other Precautions Contact/isolation;Chair Alarm;Bed Alarm;Fall Risk;Pain;WBS   ADL   LB Dressing Assistance 1  Total Assistance   LB Dressing Deficit Don/doff R sock;Don/doff L sock   Bed Mobility   Rolling R 2  Maximal assistance   Additional items Assist x 1;Bedrails;Increased time required;LE management;Verbal cues   Rolling L 2  Maximal assistance   Additional items Assist x 1;Bedrails;Increased time required;LE management;Verbal cues   Sit to Supine 2  Maximal assistance   Additional items Assist x 2;Increased time required;Verbal cues;LE management;Other  (trunk)   Additional Comments Arrived to pt sitting EOB w PT at bedside   Transfers   Sit to Stand 2  Maximal assistance   Additional items Assist x 2;Bedrails;Increased time required;Verbal cues;Other  (RW)   Stand to Sit 2  Maximal assistance   Additional items Assist x 2;Bedrails;Increased time required;Verbal cues;Other  (RW)   Functional Mobility   Additional Comments EFRAIN   Subjective   Subjective \"I can't stand longer\"   Cognition   Overall Cognitive Status WFL   Orientation Level Oriented X4 "   Activity Tolerance   Activity Tolerance Patient tolerated treatment well   Medical Staff Made Aware Yes   Assessment   Assessment Pt seen for OT f/u treatment session focusing on functional activity tolerance, bed mobility, ADLs, functional transfers/mobility, and Safe transfer techniques. Upon OT arrival, pt was found seated EOB, agreeable to OT tx session. Pt continues to regress w therapy interventions. Please refer to flowsheet for functional performance. Patient requiring verbal cues for safety and verbal cues for pacing through activity steps.  Patient continues to be functioning below baseline level, occupational performance remains limited secondary to factors listed above and increased risk for falls and injury. Pt lying supine with bed alarm activated at end of session. Call bell and phone within reach. All needs met and pt reports no further questions for OT at this time. Currently, pt is recommended for level 2 resource intensity at time of d/c. Given pt's CLOF, will reduce POC to 2-3x/wk.   Plan   Treatment Interventions ADL retraining;UE strengthening/ROM;Functional transfer training;Endurance training;Cognitive reorientation;Patient/family training;Equipment evaluation/education;Neuromuscular reeducation;Compensatory technique education;Continued evaluation;Energy conservation;Activityengagement   Goal Expiration Date 12/04/24   OT Treatment Day 1   OT Frequency 2-3x/wk   Discharge Recommendation   Rehab Resource Intensity Level, OT II (Moderate Resource Intensity)   AM-PAC Daily Activity Inpatient   Lower Body Dressing 1   Bathing 2   Toileting 2   Upper Body Dressing 2   Grooming 3   Eating 4   Daily Activity Raw Score 14   Daily Activity Standardized Score (Calc for Raw Score >=11) 33.39   AM-PAC Applied Cognition Inpatient   Following a Speech/Presentation 4   Understanding Ordinary Conversation 4   Taking Medications 3   Remembering Where Things Are Placed or Put Away 3   Remembering List of  4-5 Errands 3   Taking Care of Complicated Tasks 3   Applied Cognition Raw Score 20   Applied Cognition Standardized Score 41.76     Teresa Gutierrez

## 2024-11-27 NOTE — PROGRESS NOTES
Progress Note - Hospitalist   Name: Mattie Varela 79 y.o. female I MRN: [id number]  Unit/Bed#: E4 -01 I Date of Admission: 11/17/2024   Date of Service: 11/27/2024 I Hospital Day: 10    Assessment & Plan  Chest pain  Complained of chest pain with inspiration  Obtained EKG- without signs of acute ischemia, Trop was 22  Chest x-ray: without pneumonia, effusion, pneumothorax  Patient with risk factors for PE of immobility, recent fracture -PE ruled out with CTA chest  Noted heavy atherosclerotic coronary artery calcification  Last cath was in 2023 with known disease that was treated with medical management  Discussed with cardiology and ongoing medical management recommended  Patella fracture  S/p recent fall at [facility]  CT left knee showing what appears to be a fracture of her bipartite patella   Seen by orthopedic team  Knee immobilizer x 2 weeks with WBAT to LLE   Office follow-up in 2 weeks with repeat x-rays   PT OT recommending moderate resource intensity-level 2    Type 2 diabetes mellitus with other skin complications (HCC)  Lab Results   Component Value Date    HGBA1C 7.4 (H) 10/12/2024     Recent Labs     11/26/24  1546 11/26/24  2057 11/27/24  0732 11/27/24  1216   POCGLU 153* 225* 153* 195*   Holding Metformin  Continue insulin sliding scale  Added Premeal insulin at 3 units 3 times daily with meals  Urinary retention  Had overactive bladder in the past and received Botox.   Continues to have urinary retention requiring ahn catheter placement. For outpatient voiding trial.  Void trial should occur with urology in the outpt setting  UA >100,000  cfu GNR. Was recently treated for Klebsiella cystitis  Without symptoms, fever or leukocytosis & chronic ahn, monitor off abx  Chronic pain disorder  History of chronic pain with degenerative spine and history of spinal stimulator in place (although does not work)  On pain medication in the outpatient setting  PDMP queried- no red flags  The patient has not  found oxycodone helpful.  She will be started on oral hydromorphone.  Muscle relaxants and other sedating drugs will be stopped.  Gabapentin will be increased somewhat.  Hypertension  Home regimen metoprolol succinate 50 mg twice daily  Dosage reduced in the setting of lowish blood pressures  Currently on metoprolol succinate 12.5 mg twice daily-for SBP less than 110  Hypothyroidism  Continue levothyroxine  Insomnia  Zolpidem has been stopped since the patient has been started on hydromorphone.  Degenerative lumbar spinal stenosis    SADAF (acute kidney injury) (HCC)  Torsemide restarted today  Acute on chronic diastolic congestive heart failure (HCC)  Wt Readings from Last 3 Encounters:   11/27/24 85.3 kg (188 lb 0.8 oz)   10/18/24 74.3 kg (163 lb 12.8 oz)   10/02/24 85.5 kg (188 lb 9.6 oz)   79 year old female presented with shortness of breath, lower extremity edema, volume overload, and weight gain secondary to acute on chronic diastolic congestive heart failure.  Echo was updated-EF 51%, systolic function low normal, septic akinetic to paradoxical, endocardial detection was significantly limited.  Unable to assess diastolic function  On metoprolol 50 mg twice daily  Down about 6.3 L, admission weight 194 pounds, current weight stable at 176 pounds  The cost of Jardiance is prohibitive.  Restarting torsemide today per cards    VTE Pharmacologic Prophylaxis: VTE Score: 11 High Risk (Score >/= 5) - Pharmacological DVT Prophylaxis Ordered: heparin. Sequential Compression Devices Ordered.    Mobility:   Basic Mobility Inpatient Raw Score: 6  JH-HLM Goal: 2: Bed activities/Dependent transfer  JH-HLM Achieved: 3: Sit at edge of bed  JH-HLM Goal achieved. Continue to encourage appropriate mobility.    Patient Centered Rounds: I performed bedside rounds with nursing staff today.   Discussions with Specialists or Other Care Team Provider:     Education and Discussions with Family / Patient: Patient declined call to contact  person.     Current Length of Stay: 10 day(s)  Current Patient Status: Inpatient   Certification Statement: The patient will continue to require additional inpatient hospital stay due to diuretics  Discharge Plan: Anticipate discharge in 24-48 hrs to discharge location to be determined pending rehab evaluations.    Code Status: Level 1 - Full Code    Subjective   Patient denies any acute complaints states her breathing is improved    Objective :  Temp:  [97.4 °F (36.3 °C)-98.2 °F (36.8 °C)] 97.4 °F (36.3 °C)  HR:  [88-98] 98  BP: (123-133)/(56-72) 133/63  Resp:  [16-20] 20  SpO2:  [94 %-100 %] 95 %  O2 Device: None (Room air)    Body mass index is 36.73 kg/m².     Input and Output Summary (last 24 hours):     Intake/Output Summary (Last 24 hours) at 11/27/2024 1504  Last data filed at 11/27/2024 0300  Gross per 24 hour   Intake --   Output 1150 ml   Net -1150 ml       Physical Exam  Vitals and nursing note reviewed.   Constitutional:       General: She is not in acute distress.     Appearance: She is well-developed. She is not toxic-appearing or diaphoretic.   HENT:      Head: Normocephalic and atraumatic.   Eyes:      General: No scleral icterus.     Conjunctiva/sclera: Conjunctivae normal.   Cardiovascular:      Rate and Rhythm: Normal rate and regular rhythm.      Heart sounds: Murmur heard.      No friction rub. No gallop.   Pulmonary:      Effort: Pulmonary effort is normal. No respiratory distress.      Breath sounds: No stridor. Rales present. No wheezing or rhonchi.   Chest:      Chest wall: No tenderness.   Abdominal:      General: There is no distension.      Palpations: Abdomen is soft. There is no mass.      Tenderness: There is no abdominal tenderness. There is no guarding or rebound.      Hernia: No hernia is present.   Musculoskeletal:         General: No swelling or tenderness.      Cervical back: Neck supple.   Skin:     General: Skin is warm and dry.      Capillary Refill: Capillary refill takes  less than 2 seconds.   Neurological:      Mental Status: She is alert and oriented to person, place, and time.   Psychiatric:         Mood and Affect: Mood normal.           Lines/Drains:  Lines/Drains/Airways       Active Status       Name Placement date Placement time Site Days    Urethral Catheter 16 Fr. 10/26/24  1311  --  32                  Urinary Catheter:  Goal for removal: Voiding trial when ambulation improves                 Lab Results: I have reviewed the following results:   Results from last 7 days   Lab Units 11/25/24  0539   WBC Thousand/uL 6.90   HEMOGLOBIN g/dL 8.5*   HEMATOCRIT % 27.1*   PLATELETS Thousands/uL 270     Results from last 7 days   Lab Units 11/26/24  1246 11/25/24  0539 11/24/24  0618   SODIUM mmol/L 139   < > 133*   POTASSIUM mmol/L 4.4   < > 4.0   CHLORIDE mmol/L 106   < > 99   CO2 mmol/L 29   < > 26   BUN mg/dL 34*   < > 44*   CREATININE mg/dL 1.97*   < > 3.40*   ANION GAP mmol/L 4   < > 8   CALCIUM mg/dL 9.4   < > 9.3   ALBUMIN g/dL  --   --  3.0*   TOTAL BILIRUBIN mg/dL  --   --  0.30   ALK PHOS U/L  --   --  76   ALT U/L  --   --  5*   AST U/L  --   --  13   GLUCOSE RANDOM mg/dL 154*   < > 136    < > = values in this interval not displayed.         Results from last 7 days   Lab Units 11/27/24  1216 11/27/24  0732 11/26/24  2057 11/26/24  1546 11/26/24  1127 11/26/24  0833 11/25/24  2035 11/25/24  1546 11/25/24  1234 11/25/24  0751 11/24/24  2056 11/24/24  1644   POC GLUCOSE mg/dl 195* 153* 225* 153* 171* 144* 201* 251* 240* 150* 182* 136               Recent Cultures (last 7 days):         Imaging Results Review: No pertinent imaging studies reviewed.  Other Study Results Review: No additional pertinent studies reviewed.    Last 24 Hours Medication List:     Current Facility-Administered Medications:     acetaminophen (TYLENOL) tablet 975 mg, Q8H ARY    albuterol (PROVENTIL HFA,VENTOLIN HFA) inhaler 2 puff, Q6H PRN    aspirin chewable tablet 81 mg, Daily    atorvastatin  (LIPITOR) tablet 40 mg, Daily With Dinner    gabapentin (NEURONTIN) capsule 300 mg, HS    guaiFENesin (MUCINEX) 12 hr tablet 600 mg, Q12H ARY    heparin (porcine) subcutaneous injection 5,000 Units, Q8H ARY    HYDROmorphone (DILAUDID) tablet 2 mg, 4x Daily    HYDROmorphone HCl (DILAUDID) injection 0.2 mg, Q4H PRN    insulin lispro (HumALOG/ADMELOG) 100 units/mL subcutaneous injection 1-6 Units, 4x Daily (AC & HS) **AND** Fingerstick Glucose (POCT), 4x Daily AC and at bedtime    latanoprost (XALATAN) 0.005 % ophthalmic solution 1 drop, HS    levalbuterol (XOPENEX) inhalation solution 1.25 mg, Q6H PRN    levothyroxine tablet 37.5 mcg, Early Morning    lidocaine (LIDODERM) 5 % patch 1 patch, Daily    melatonin tablet 3 mg, HS    methocarbamol (ROBAXIN) tablet 250 mg, Q8H PRN    metoprolol succinate (TOPROL-XL) 24 hr tablet 12.5 mg, Daily    pantoprazole (PROTONIX) EC tablet 40 mg, Early Morning    sucralfate (CARAFATE) tablet 1 g, BID AC    torsemide (DEMADEX) tablet 30 mg, BID    white petrolatum-mineral oil (EUCERIN,HYDROCERIN) cream, TID PRN    Administrative Statements   Today, Patient Was Seen By: Manuelito Mccray DO      **Please Note: This note may have been constructed using a voice recognition system.**

## 2024-11-27 NOTE — PHYSICAL THERAPY NOTE
PT PROGRESS NOTE    Name: Mattie Varela  AGE: 79 y.o.  MRN: [id number]  LENGTH OF STAY: 10    Admitting Dx:  Swelling [R60.9]  Knee pain [M25.569]  Fatigue [R53.83]  Dyspnea [R06.00]  CHF exacerbation (HCC) [I50.9]      Patient Active Problem List   Diagnosis    Failed back surgical syndrome    Degenerative lumbar spinal stenosis    Type 2 diabetes mellitus with other skin complications ([facility])    Chronic pain disorder    Dyspepsia    Glaucoma    Hypertension    Iron deficiency anemia secondary to inadequate dietary iron intake    Anxiety    Hypercholesterolemia    Hypothyroidism    Overactive bladder    S/P insertion of spinal cord stimulator    Mild intermittent asthma without complication    Prepyloric ulcer    Diarrhea    Cerebrovascular accident (CVA), unspecified mechanism ([facility])    Confusion with body shakes and blank stare    Anemia in stage 3a chronic kidney disease  ([facility])    Lower extremity edema    Chronic obstructive pulmonary disease, unspecified COPD type ([facility])    Abnormal urinalysis    History of lumbar surgery    Non obstructive CAD    Exertional shortness of breath    Urinary retention    Left ventricular outflow obstruction    Obesity, Class I, BMI 30-34.9    Hypertensive urgency    AMS (altered mental status)    Encounter for examination for admission to nursing home    Moderate protein-calorie malnutrition (HCC)    s/p [employer] loop recorder 3/2/2024    Acute on chronic diastolic congestive heart failure ([facility])    Leg pain, right    Constipation    Generalized edema    Detrusor sphincter dyssynergia    Chest pain    Acute low back pain    Acute kidney injury superimposed on chronic kidney disease  (HCC)    Chronic heart failure with preserved ejection fraction (HFpEF) ([facility])    Pain in both lower extremities    Patella fracture    Insomnia    SADAF (acute kidney injury) ([facility])               11/27/24 1425   PT Last Visit   PT Visit Date 11/27/24   Note Type   Note Type Treatment   Pain Assessment    Pain Score 10 - Worst Possible Pain   Pain Location/Orientation Location: Back   Hospital Pain Intervention(s) Medication (See MAR);Repositioned;Ambulation/increased activity;Emotional support;Rest  (RN made aware)   Restrictions/Precautions   LLE Weight Bearing Per Order WBAT   Braces or Orthoses Knee immobilizer   Other Precautions Contact/isolation;Chair Alarm;Bed Alarm;Fall Risk;Pain;WBS   General   Chart Reviewed Yes   Response to Previous Treatment Patient with no complaints from previous session.   Family/Caregiver Present No   Cognition   Arousal/Participation Alert;Cooperative   Attention Attends with cues to redirect   Orientation Level Oriented to person;Oriented to place   Following Commands Follows one step commands without difficulty   Comments pleasant & cooperative after encouragement & education   Subjective   Subjective Pt agreeable to PT tx after encouragement & education.   Bed Mobility   Rolling R 2  Maximal assistance   Additional items Assist x 1;Bedrails;Increased time required;Verbal cues;LE management   Rolling L 2  Maximal assistance   Additional items Assist x 1;Bedrails;Increased time required;Verbal cues;LE management   Supine to Sit 2  Maximal assistance   Additional items Assist x 2;HOB elevated;Bedrails;Increased time required;Verbal cues;LE management   Sit to Supine 2  Maximal assistance   Additional items Assist x 2;Increased time required;Verbal cues;LE management   Additional Comments cues for techniques & safety; donned LLE knee immobilizer prior to mobility   Transfers   Sit to Stand 2  Maximal assistance   Additional items Assist x 2;Increased time required;Verbal cues;Bedrails   Stand to Sit 2  Maximal assistance   Additional items Assist x 2;Increased time required;Verbal cues   Additional Comments cues for techniques & safety as well as cues to inc upright posture & inc BUE support on RW to achieve standing upright posture   Ambulation/Elevation   Gait pattern Not  appropriate   Ambulation/Elevation Additional Comments attempted but pt unable to tolerate at this time; pt w/ poor standing balance & tolerance   Balance   Static Sitting Fair -   Dynamic Sitting Poor +   Static Standing Poor -  (w/ RW)   Dynamic Standing   (zero)   Ambulatory Zero   Endurance Deficit   Endurance Deficit Yes   Endurance Deficit Description pain   Activity Tolerance   Activity Tolerance Patient limited by pain;Treatment limited secondary to medical complications (Comment)   Nurse Made Aware yes   Exercises   Heelslides Supine;10 reps;AAROM;Right   Hip Flexion Supine;10 reps;AAROM;Bilateral   Hip Abduction Supine;10 reps;AAROM;Bilateral   Hip Adduction Supine;10 reps;AAROM;Bilateral   Knee AROM Long Arc Quad Sitting;10 reps;AROM;Right   Ankle Pumps Supine;10 reps;AROM;Bilateral   Assessment   Prognosis Fair   Problem List Decreased strength;Decreased range of motion;Decreased endurance;Impaired balance;Decreased mobility;Impaired judgement;Decreased safety awareness;Pain;Orthopedic restrictions;Decreased skin integrity   Assessment Pt seen for PT per POC. PT goals expiring, no goals met. PT goals updated. Pt continue to demonstrate impaired overall functional mobility. Pt continue to require maxAx1 for rolling in bed however require maxAx2 for supine<>sit & sit<>stand transitions + cues for techniques & safety. Pt only able to tolerate sitting at EOB approx. <10mins. Attempted amb w/ RW but pt unable to tolerate at this time. Pt w/ poor standing balance & tolerance. Pt tolerated above mentioned thera.ex well. Pt require cues for proper exercise techniques and performance.  Please see flowsheet above for objective findings & levels of assistance required for all functional tasks during this tx session. Nsg staff most recent vital signs as follows: /63 (BP Location: Left arm)   Pulse 98   Temp (!) 97.4 °F (36.3 °C) (Temporal)   Resp 20   Ht 5' (1.524 m)   Wt 85.3 kg (188 lb 0.8 oz)   LMP   (LMP Unknown)   SpO2 95%   BMI 36.73 kg/m² . Will continue PT per POC. At end of session, pt back in bed in stable condition, call bell & phone in reach, bed alarm activated. Fall precautions reinforced w/ good understanding. The patient's AM-PAC Basic Mobility Inpatient Short Form Raw Score is 6. A Raw score of less than or equal to 16 suggests the patient may benefit from discharge to post-acute rehabilitation services. Please also refer to the recommendation of the Physical Therapist for safe discharge planning. From PT standpoint, will continue to recommend Level II (moderate resource intensity) rehab services at [facility]  to follow. Nsg staff to continue to mobilized pt as tolerated to prevent decline in function. Nsg notified. Co-tx was necessary to complete this PT tx session for the pt's best interest given pt's medical acuity & complexity.   Goals   Patient Goals to have less pain   STG Expiration Date 12/11/24   Short Term Goal #1 Goals to be met in 14 days; pt will be able to: 1) inc strength & balance by 1/2 grade to improve overall functional mobility & dec fall risk; 2) inc bed mobility to modAx1 for pt to be able to get in/OOB safely w/ proper techniques 100% of the time, to dec caregiver burden & safely function at home; 3) inc transfers to modAx1 for pt to transition safely from one surface to another w/o % of the time, to dec caregiver burden & safely function at home; 4) inc amb w/ RW approx. >50' w/ modAx1 for pt to ambulate as tolerated w/o any % of the time, to dec caregiver burden & safely function at home; 5)  pt/caregiver ed   PT Treatment Day 4   Plan   Treatment/Interventions Functional transfer training;LE strengthening/ROM;Therapeutic exercise;Endurance training;Patient/family training;Gait training;Spoke to nursing;OT   Progress Slow progress, decreased activity tolerance   PT Frequency 2-3x/wk   Discharge Recommendation   Rehab Resource Intensity Level, PT II (Moderate  Resource Intensity)   AM-PAC Basic Mobility Inpatient   Turning in Flat Bed Without Bedrails 1   Lying on Back to Sitting on Edge of Flat Bed Without Bedrails 1   Moving Bed to Chair 1   Standing Up From Chair Using Arms 1   Walk in Room 1   Climb 3-5 Stairs With Railing 1   Basic Mobility Inpatient Raw Score 6   Turning Head Towards Sound 4   Follow Simple Instructions 3   Low Function Basic Mobility Raw Score  13   Low Function Basic Mobility Standardized Score  20.14   [facility] Highest Level Of Mobility   -[facility] Goal 2: Bed activities/Dependent transfer   -[facility] Achieved 3: Sit at edge of bed   End of Consult   Patient Position at End of Consult Supine;Bed/Chair alarm activated;All needs within reach   End of Consult Comments Pt in stable condition. All needs in reach. Bed alarm activated.   Cosme Estrada

## 2024-11-27 NOTE — ASSESSMENT & PLAN NOTE
Wt Readings from Last 3 Encounters:   11/27/24 85.3 kg (188 lb 0.8 oz)   10/18/24 74.3 kg (163 lb 12.8 oz)   10/02/24 85.5 kg (188 lb 9.6 oz)   79 year old female presented with shortness of breath, lower extremity edema, volume overload, and weight gain secondary to acute on chronic diastolic congestive heart failure.  Echo was updated-EF 51%, systolic function low normal, septic akinetic to paradoxical, endocardial detection was significantly limited.  Unable to assess diastolic function  On metoprolol 50 mg twice daily  Down about 6.3 L, admission weight 194 pounds, current weight stable at 176 pounds  The cost of Jardiance is prohibitive.  Restarting torsemide today per cards

## 2024-11-27 NOTE — PROGRESS NOTES
Progress Note - Cardiology   Name: Mattie Varela 79 y.o. female I MRN: [id number]  Unit/Bed#: E4 -01 I Date of Admission: 11/17/2024   Date of Service: 11/27/2024 I Hospital Day: 10     Assessment & Plan  Acute on chronic diastolic congestive heart failure (HCC)  HFpEF, LVEF 51%, LVIDd 4.1 cm (May 2024), NYHA Class III, AHA Stage C, etiology is NAVYA  Neurohormonal Blockade:              -- beta blocker: Toprol-XL 12.5 mg BID  -- ACE/ARB/ARNi: none  -- aldosterone antagonist: none   -- SGLT2: Not affordable.  -- Home diuretic: torsemide 30 mg daily  -- IP diuretic: None    Examines euvolemic .   Will restart torsemide today 30 mg by mouth twice a day  Hypertension  Home Toprol was decreased from 50 mg BID to 12.5 mg BID to avoid hypotension  Normotensive now.  Degenerative lumbar spinal stenosis    Type 2 diabetes mellitus with other skin complications ([facility])  - A1c 10/12/24: 7.4  - Home meds metformin 500 mg PO BID-hold while inpatient.  Continue with Humalog insulin  Chronic pain disorder    Hypothyroidism  - continue levothyroxine 37.5 mcg  Urinary retention  - history of urinary retention with spine stimulator in place   - continues to have urinary retention requiring Bateman catheter   - need outpatient voiding trial  Chest pain    Patella fracture  - XR 11/15/24: showing smooth linear lucencies coursing through the superior lateral aspect of the patella, nondisplaced fracture difficult to exclude   - pending CT of lower extremity   - orthopedics following  Insomnia    SADAF (acute kidney injury) ([facility])      Subjective:   HPI  Does note some shortness of breath today, no chest pain, no lower extremity swelling      Review of Systems: As noted in HPI. Rest of ROS is negative.    Vitals:   /63 (BP Location: Left arm)   Pulse 98   Temp (!) 97.4 °F (36.3 °C) (Temporal)   Resp 20   Ht 5' (1.524 m)   Wt 85.3 kg (188 lb 0.8 oz)   LMP  (LMP Unknown)   SpO2 95%   BMI 36.73 kg/m²   I/O         11/25  0701  11/26 0700 11/26 0701  11/27 0700 11/27 0701  11/28 0700    P.O. 480 120     IV Piggyback       Total Intake(mL/kg) 480 (5.6) 120 (1.4)     Urine (mL/kg/hr) 1550 (0.8) 1150 (0.6)     Total Output 1550 1150     Net -1070 -1030                  Weight (last 2 days)       Date/Time Weight    11/27/24 0600 85.3 (188.05)    11/26/24 0600 85.1 (187.61)    11/25/24 0600 84.2 (185.63)            Physical Exam   Constitutional: awake, alert and oriented, in no acute distress, no obvious deformities, obese female  Head: Normocephalic, without obvious abnormality, atraumatic  Eyes: conjunctivae clear and moist. Sclera anicteric. No xanthelasmas. Pupils equal bilaterally. Extraocular motions are full.  Ear nose mouth and throat: ears are symmetrical bilaterally, hearing appears to be equal bilaterally, no nasal discharge or epistaxis, oropharynx is clear with moist mucous membranes  Neck: Trachea is midline, neck is supple, no thyromegaly or significant lymphadenopathy, there is full range of motion.  Lungs: Creased breath sounds however clear to auscultation bilaterally, no wheezes, no rales, no rhonchi, no accessory muscle use, breathing is nonlabored  Heart: regular rate and rhythm, S1, S2 normal, 2/6 systolic murmur right upper sternal border no click, no rub and no gallop, no lower extremity edema  Abdomen: Obese, soft, non-tender; bowel sounds normal; no masses, no organomegaly  Psychiatric: Patient is oriented to time, place, person, mood/affect is negative for depression, anxiety, agitation, appears to have appropriate insight  Skin: Skin is warm, dry, intact. No obvious rashes or lesions on exposed extremities. Nail beds are pink with no cyanosis or clubbing.    TELEMETRY:   Lab Results:  Results from last 7 days   Lab Units 11/25/24  0539   WBC Thousand/uL 6.90   HEMOGLOBIN g/dL 8.5*   HEMATOCRIT % 27.1*   PLATELETS Thousands/uL 270     Results from last 7 days   Lab Units 11/26/24  1246 11/25/24  0539  11/24/24  0618   POTASSIUM mmol/L 4.4   < > 4.0   CHLORIDE mmol/L 106   < > 99   CO2 mmol/L 29   < > 26   BUN mg/dL 34*   < > 44*   CREATININE mg/dL 1.97*   < > 3.40*   CALCIUM mg/dL 9.4   < > 9.3   ALK PHOS U/L  --   --  76   ALT U/L  --   --  5*   AST U/L  --   --  13    < > = values in this interval not displayed.     Results from last 7 days   Lab Units 11/26/24  1246   POTASSIUM mmol/L 4.4   CHLORIDE mmol/L 106   CO2 mmol/L 29   BUN mg/dL 34*   CREATININE mg/dL 1.97*   CALCIUM mg/dL 9.4           Medications:    Current Facility-Administered Medications:     acetaminophen (TYLENOL) tablet 975 mg, 975 mg, Oral, Q8H [facility], Susannah Mcclain PA-C, 975 mg at 11/27/24 0558    albuterol (PROVENTIL HFA,VENTOLIN HFA) inhaler 2 puff, 2 puff, Inhalation, Q6H PRN, Paco Carmona MD, 2 puff at 11/1943    aspirin chewable tablet 81 mg, 81 mg, Oral, Daily, Paco Carmona MD, 81 mg at 11/27/24 0853    atorvastatin (LIPITOR) tablet 40 mg, 40 mg, Oral, Daily With Dinner, Paco Carmona MD, 40 mg at 11/26/24 1753    gabapentin (NEURONTIN) capsule 300 mg, 300 mg, Oral, HS, Khurram Ceron MD, 300 mg at 11/26/24 2207    guaiFENesin (MUCINEX) 12 hr tablet 600 mg, 600 mg, Oral, Q12H [facility], 600 mg at 11/27/24 0853    heparin (porcine) subcutaneous injection 5,000 Units, 5,000 Units, Subcutaneous, Q8H Susannah MCALLISTER PA-C, 5,000 Units at 11/27/24 0558    HYDROmorphone (DILAUDID) tablet 2 mg, 2 mg, Oral, 4x Daily, Khurram Ceron MD, 2 mg at 11/27/24 0853    HYDROmorphone HCl (DILAUDID) injection 0.2 mg, 0.2 mg, Intravenous, Q4H PRN, Khurram Ceron MD, 0.2 mg at 11/1943    insulin lispro (HumALOG/ADMELOG) 100 units/mL subcutaneous injection 1-6 Units, 1-6 Units, Subcutaneous, 4x Daily (AC & HS), 1 Units at 11/27/24 0854 **AND** Fingerstick Glucose (POCT), , , 4x Daily AC and at bedtime, NANY Miller    latanoprost (XALATAN) 0.005 % ophthalmic solution 1 drop, 1 drop, Both Eyes, HS, Paco Carmona,  "MD, 1 drop at 11/22/24 2125    levalbuterol (XOPENEX) inhalation solution 1.25 mg, 1.25 mg, Nebulization, Q6H PRN, Perlita Torres MD    levothyroxine tablet 37.5 mcg, 37.5 mcg, Oral, Early Morning, Paco Carmona MD, 37.5 mcg at 11/27/24 0558    lidocaine (LIDODERM) 5 % patch 1 patch, 1 patch, Topical, Daily, Perlita Torres MD, 1 patch at 11/27/24 0856    melatonin tablet 3 mg, 3 mg, Oral, HS PRN, Bi Luke PA-C, 3 mg at 11/26/24 2209    metoprolol succinate (TOPROL-XL) 24 hr tablet 12.5 mg, 12.5 mg, Oral, Daily, Jackson Morris MD, 12.5 mg at 11/27/24 0853    pantoprazole (PROTONIX) EC tablet 40 mg, 40 mg, Oral, Early Morning, Paco Carmona MD, 40 mg at 11/27/24 0558    sucralfate (CARAFATE) tablet 1 g, 1 g, Oral, BID AC, Krystina Preston PA-C, 1 g at 11/27/24 0558    [Held by provider] torsemide (DEMADEX) tablet 30 mg, 30 mg, Oral, BID, Rukhsana Dasilva PA-C, 30 mg at 11/21/24 0813    white petrolatum-mineral oil (EUCERIN,HYDROCERIN) cream, , Topical, TID PRN, Joseph Carrasco PA-C, Given at 11/26/24 0257    Portions of the record may have been created with voice recognition software. Occasional wrong word or \"sound a like\" substitutions may have occurred due to the inherent limitations of voice recognition software. Read the chart carefully and recognize, using context, where substitutions have occurred.    Torey Farnsworth DO, [facility], FASNC  11/27/2024 11:21 AM      "

## 2024-11-27 NOTE — PROGRESS NOTES
Patient:  LEO ZAPATA    MRN:  [id number]    Aidin Request ID:  [id number]    Level of care reserved:  Skilled Nursing Facility    Partner Reserved:  [facility], [place] [address] [phone]    Clinical needs requested:  physical therapy, occupational therapy    Geography searched:  10 miles around [phone]    Start of Service:    Request sent:  9:18am EST on 11/21/2024 by Torey Turner    Partner reserved:  3:33pm EST on 11/27/2024 by Torey Turner    Choice list shared:  10:55am EST on 11/21/2024 by Torey Turner

## 2024-11-27 NOTE — PLAN OF CARE
Problem: Potential for Falls  Goal: Patient will remain free of falls  Description: INTERVENTIONS:  - Educate patient/family on patient safety including physical limitations  - Instruct patient to call for assistance with activity   - Consult OT/PT to assist with strengthening/mobility   - Keep Call bell within reach  - Keep bed low and locked with side rails adjusted as appropriate  - Keep care items and personal belongings within reach  - Initiate and maintain comfort rounds  - Make Fall Risk Sign visible to staff  - Offer Toileting every  Hours, in advance of need  - Initiate/Maintain alarm  - Obtain necessary fall risk management equipment:   - Apply yellow socks and bracelet for high fall risk patients  - Consider moving patient to room near nurses station  Outcome: Progressing     Problem: PAIN - ADULT  Goal: Verbalizes/displays adequate comfort level or baseline comfort level  Description: Interventions:  - Encourage patient to monitor pain and request assistance  - Assess pain using appropriate pain scale  - Administer analgesics based on type and severity of pain and evaluate response  - Implement non-pharmacological measures as appropriate and evaluate response  - Consider cultural and social influences on pain and pain management  - Notify physician/advanced practitioner if interventions unsuccessful or patient reports new pain  Outcome: Progressing     Problem: SAFETY ADULT  Goal: Patient will remain free of falls  Description: INTERVENTIONS:  - Educate patient/family on patient safety including physical limitations  - Instruct patient to call for assistance with activity   - Consult OT/PT to assist with strengthening/mobility   - Keep Call bell within reach  - Keep bed low and locked with side rails adjusted as appropriate  - Keep care items and personal belongings within reach  - Initiate and maintain comfort rounds  - Make Fall Risk Sign visible to staff  - Offer Toileting every  Hours, in advance  of need  - Initiate/Maintain alarm  - Obtain necessary fall risk management equipment:   - Apply yellow socks and bracelet for high fall risk patients  - Consider moving patient to room near nurses station  Outcome: Progressing  Goal: Maintain or return to baseline ADL function  Description: INTERVENTIONS:  -  Assess patient's ability to carry out ADLs; assess patient's baseline for ADL function and identify physical deficits which impact ability to perform ADLs (bathing, care of mouth/teeth, toileting, grooming, dressing, etc.)  - Assess/evaluate cause of self-care deficits   - Assess range of motion  - Assess patient's mobility; develop plan if impaired  - Assess patient's need for assistive devices and provide as appropriate  - Encourage maximum independence but intervene and supervise when necessary  - Involve family in performance of ADLs  - Assess for home care needs following discharge   - Consider OT consult to assist with ADL evaluation and planning for discharge  - Provide patient education as appropriate  Outcome: Progressing  Goal: Maintains/Returns to pre admission functional level  Description: INTERVENTIONS:  - Perform AM-PAC 6 Click Basic Mobility/ Daily Activity assessment daily.  - Set and communicate daily mobility goal to care team and patient/family/caregiver.   - Collaborate with rehabilitation services on mobility goals if consulted  - Perform Range of Motion  times a day.  - Reposition patient every  hours.  - Dangle patient  times a day  - Stand patient  times a day  - Ambulate patient  times a day  - Out of bed to chair  times a day   - Out of bed for meals  times a day  - Out of bed for toileting  - Record patient progress and toleration of activity level   Outcome: Progressing     Problem: DISCHARGE PLANNING  Goal: Discharge to home or other facility with appropriate resources  Description: INTERVENTIONS:  - Identify barriers to discharge w/patient and caregiver  - Arrange for needed  discharge resources and transportation as appropriate  - Identify discharge learning needs (meds, wound care, etc.)  - Arrange for interpretive services to assist at discharge as needed  - Refer to Case Management Department for coordinating discharge planning if the patient needs post-hospital services based on physician/advanced practitioner order or complex needs related to functional status, cognitive ability, or social support system  Outcome: Progressing     Problem: Knowledge Deficit  Goal: Patient/family/caregiver demonstrates understanding of disease process, treatment plan, medications, and discharge instructions  Description: Complete learning assessment and assess knowledge base.  Interventions:  - Provide teaching at level of understanding  - Provide teaching via preferred learning methods  Outcome: Progressing     Problem: CARDIOVASCULAR - ADULT  Goal: Maintains optimal cardiac output and hemodynamic stability  Description: INTERVENTIONS:  - Monitor I/O, vital signs and rhythm  - Monitor for S/S and trends of decreased cardiac output  - Administer and titrate ordered vasoactive medications to optimize hemodynamic stability  - Assess quality of pulses, skin color and temperature  - Assess for signs of decreased coronary artery perfusion  - Instruct patient to report change in severity of symptoms  Outcome: Progressing  Goal: Absence of cardiac dysrhythmias or at baseline rhythm  Description: INTERVENTIONS:  - Continuous cardiac monitoring, vital signs, obtain 12 lead EKG if ordered  - Administer antiarrhythmic and heart rate control medications as ordered  - Monitor electrolytes and administer replacement therapy as ordered  Outcome: Progressing     Problem: RESPIRATORY - ADULT  Goal: Achieves optimal ventilation and oxygenation  Description: INTERVENTIONS:  - Assess for changes in respiratory status  - Assess for changes in mentation and behavior  - Position to facilitate oxygenation and minimize  respiratory effort  - Oxygen administered by appropriate delivery if ordered  - Initiate smoking cessation education as indicated  - Encourage broncho-pulmonary hygiene including cough, deep breathe, Incentive Spirometry  - Assess the need for suctioning and aspirate as needed  - Assess and instruct to report SOB or any respiratory difficulty  - Respiratory Therapy support as indicated  Outcome: Progressing     Problem: Prexisting or High Potential for Compromised Skin Integrity  Goal: Skin integrity is maintained or improved  Description: INTERVENTIONS:  - Identify patients at risk for skin breakdown  - Assess and monitor skin integrity  - Assess and monitor nutrition and hydration status  - Monitor labs   - Assess for incontinence   - Turn and reposition patient  - Assist with mobility/ambulation  - Relieve pressure over bony prominences  - Avoid friction and shearing  - Provide appropriate hygiene as needed including keeping skin clean and dry  - Evaluate need for skin moisturizer/barrier cream  - Collaborate with interdisciplinary team   - Patient/family teaching  - Consider wound care consult   Outcome: Progressing

## 2024-11-27 NOTE — RESTORATIVE TECHNICIAN NOTE
Restorative Technician Note      Patient Name: Mattie Varela     Restorative Tech Visit Date: 11/27/24  Note Type: Mobility  Patient Position Upon Consult: Supine  Activity Performed: Dangled; Repositioned  Patient Position at End of Consult: All needs within reach; Supine; Bed/Chair alarm activated          ns

## 2024-11-27 NOTE — PROGRESS NOTES
NEPHROLOGY PROGRESS NOTE   Mattie Varela 79 y.o. female MRN: [id number]  Unit/Bed#: E4 -01 Encounter: [id number]      ASSESSMENT & PLAN:  Assessment & Plan  SADAF (acute kidney injury) (HCC)  Etiology: Multifactorial: In the setting of diuresis/intravascular depletion with GI loss/contrast exposure on 11/21/2021 leading to SADAF  Initially admitted for decompensated heart failure treated with IV diuretics transition to oral diuretics currently off  Admission creatinine 1.23  Creatinine 3.14 in 11/23/2024  Her kidney function was improving with creatinine down to 1.97 yesterday  Status post albumin  Her labs this morning  Workup  CT imaging with kidneys unremarkable no hydronephrosis  Urinalysis 11/17 with innumerable WBC  Plan:  Follow BMP this morning, if renal function continues to improve okay to resume diuretics as per cardiology  Avoid relative hypotension.  Follow daily labs  Hypertension  Blood pressure remained stable  Patient did have some hypotension 11/23/2024  On beta-blocker.  Metoprolol XL  Avoid elective hypotension  Chronic pain disorder  Per primary team-may be contributing to low sodium  Hypothyroidism  Management per  primary team  Urinary retention  History of urinary retention, spine stimulator in place   Bateman catheter care per primary team  Of note, was recently treated in October for Klebsiella cystitis  Chest pain  Per cardiology and primary team  No PE on CT scan  Type 2 diabetes mellitus with other skin complications (HCC)  Lab Results   Component Value Date    HGBA1C 7.4 (H) 10/12/2024     Management as per primary team      PLAN SUMMARY:  No labs this morning, check BMP.  If kidney function continues to improve okay to resume diuretics as per cardiology.  Avoid relative hypotension.  Recommendation was discussed with internal medicine attending Dr. Mccray as above, he agreed with the plan        SUBJECTIVE:  Patient seen and examined, feeling about the same, denies significant chest  pain or shortness of breath, continues complaining of some on and off heartburn, trying to limit her fluid intake, denies nausea vomiting    OBJECTIVE:  Current Weight: Weight - Scale: 85.3 kg (188 lb 0.8 oz)  Vitals:    11/27/24 0726   BP: 133/63   Pulse: 98   Resp: 20   Temp: (!) 97.4 °F (36.3 °C)   SpO2: 95%       Intake/Output Summary (Last 24 hours) at 11/27/2024 1049  Last data filed at 11/27/2024 0300  Gross per 24 hour   Intake 120 ml   Output 1150 ml   Net -1030 ml       General: Chronically ill-appearing, cooperative, in not acute distress  Eyes: conjunctivae pale, anicteric sclerae  ENT: lips and mucous membranes moist, on room air  Neck: supple, no JVD  Chest: clear breath sounds bilateral, no crackles, ronchus or wheezings  CVS: distinct S1 & S2, normal rate, regular rhythm  Abdomen: soft, non-tender, non-distended, normoactive bowel sounds  Extremities: no significant edema of both legs  Skin: no rash  Neuro: awake, alert, interactive        Medications:    Current Facility-Administered Medications:     acetaminophen (TYLENOL) tablet 975 mg, 975 mg, Oral, Q8H [facility], Susannah Mcclain PA-C, 975 mg at 11/27/24 0558    albuterol (PROVENTIL HFA,VENTOLIN HFA) inhaler 2 puff, 2 puff, Inhalation, Q6H PRN, Paco Carmona MD, 2 puff at 11/1943    aspirin chewable tablet 81 mg, 81 mg, Oral, Daily, Paco Carmona MD, 81 mg at 11/27/24 0853    atorvastatin (LIPITOR) tablet 40 mg, 40 mg, Oral, Daily With Dinner, Paco Carmona MD, 40 mg at 11/26/24 1753    gabapentin (NEURONTIN) capsule 300 mg, 300 mg, Oral, HS, Khurram Ceron MD, 300 mg at 11/26/24 2207    guaiFENesin (MUCINEX) 12 hr tablet 600 mg, 600 mg, Oral, Q12H [facility], Bi Luke PA-C, 600 mg at 11/27/24 0853    heparin (porcine) subcutaneous injection 5,000 Units, 5,000 Units, Subcutaneous, Q8H [facility], Susannah Mcclain PA-C, 5,000 Units at 11/27/24 0558    HYDROmorphone (DILAUDID) tablet 2 mg, 2 mg, Oral, 4x Daily, Khurram Ceron MD, 2 mg at 11/27/24 1942     HYDROmorphone HCl (DILAUDID) injection 0.2 mg, 0.2 mg, Intravenous, Q4H PRN, Khurram Ceron MD, 0.2 mg at 11/1943    insulin lispro (HumALOG/ADMELOG) 100 units/mL subcutaneous injection 1-6 Units, 1-6 Units, Subcutaneous, 4x Daily (AC & HS), 1 Units at 11/27/24 0854 **AND** Fingerstick Glucose (POCT), , , 4x Daily AC and at bedtime, NANY Miller    latanoprost (XALATAN) 0.005 % ophthalmic solution 1 drop, 1 drop, Both Eyes, HS, Paco Carmona MD, 1 drop at 11/22/24 2125    levalbuterol (XOPENEX) inhalation solution 1.25 mg, 1.25 mg, Nebulization, Q6H PRN, Perlita Torres MD    levothyroxine tablet 37.5 mcg, 37.5 mcg, Oral, Early Morning, Paco Carmona MD, 37.5 mcg at 11/27/24 0558    lidocaine (LIDODERM) 5 % patch 1 patch, 1 patch, Topical, Daily, Perlita Torres MD, 1 patch at 11/27/24 0856    melatonin tablet 3 mg, 3 mg, Oral, HS PRN, Bi Luke PA-C, 3 mg at 11/26/24 2209    metoprolol succinate (TOPROL-XL) 24 hr tablet 12.5 mg, 12.5 mg, Oral, Daily, Jackson Morris MD, 12.5 mg at 11/27/24 0853    pantoprazole (PROTONIX) EC tablet 40 mg, 40 mg, Oral, Early Morning, Paco Carmona MD, 40 mg at 11/27/24 0558    sucralfate (CARAFATE) tablet 1 g, 1 g, Oral, BID AC, Krystina Preston PA-C, 1 g at 11/27/24 0558    [Held by provider] torsemide (DEMADEX) tablet 30 mg, 30 mg, Oral, BID, Rukhsana Dasilva PA-C, 30 mg at 11/21/24 0813    white petrolatum-mineral oil (EUCERIN,HYDROCERIN) cream, , Topical, TID PRN, Joseph Carrasco PA-C, Given at 11/26/24 0257    Invasive Devices:   Urethral Catheter 16 Fr. (Active)   Amt returned on insertion(mL) 450 mL 11/25/24 0559   Reasons to continue Urinary Catheter  Acute urinary retention/obstruction failing urinary retention protocol 11/26/24 0801   Goal for Removal No longer needed- Will place order to discontinue 11/26/24 0801   Site Assessment Clean;Skin intact 11/26/24 0801   Bateman Care Done 11/26/24 0801   Collection Container  "Standard drainage bag 11/26/24 0801   Securement Method Securing device (Describe) 11/26/24 0801   Output (mL) 650 mL 11/26/24 0155       Lab Results:   Results from last 7 days   Lab Units 11/26/24  1246 11/25/24  0539 11/24/24  0618 11/23/24  1132 11/23/24  0305 11/23/24  0304 11/22/24  0351   WBC Thousand/uL  --  6.90 7.16  --  8.37  --  9.62   HEMOGLOBIN g/dL  --  8.5* 9.5*  --  9.0*  --  10.2*   HEMATOCRIT %  --  27.1* 30.8*  --  27.8*  --  31.3*   PLATELETS Thousands/uL  --  270 268  --  227  --  275   SODIUM mmol/L 139 134* 133*   < >  --  134* 134*   POTASSIUM mmol/L 4.4 3.7 4.0   < >  --  3.2* 3.0*   CHLORIDE mmol/L 106 101 99   < >  --  97 94*   CO2 mmol/L 29 26 26   < >  --  27 29   BUN mg/dL 34* 40* 44*   < >  --  38* 31*   CREATININE mg/dL 1.97* 2.64* 3.40*   < >  --  3.10* 1.78*   CALCIUM mg/dL 9.4 8.7 9.3   < >  --  8.8 9.1   MAGNESIUM mg/dL  --  2.3  --   --   --  1.7*  --    PHOSPHORUS mg/dL 3.8 4.4*  --   --   --  4.5*  --    ALK PHOS U/L  --   --  76  --   --  74 70   ALT U/L  --   --  5*  --   --  5* 6*   AST U/L  --   --  13  --   --  12* 15    < > = values in this interval not displayed.         Portions of the record may have been created with voice recognition software. Occasional wrong word or \"sound a like\" substitutions may have occurred due to the inherent limitations of voice recognition software. Read the chart carefully and recognize, using context, where substitutions have occurred.If you have any questions, please contact the dictating provider.  "

## 2024-11-27 NOTE — PLAN OF CARE
Problem: PHYSICAL THERAPY ADULT  Goal: Performs mobility at highest level of function for planned discharge setting.  See evaluation for individualized goals.  Description: Treatment/Interventions: Functional transfer training, LE strengthening/ROM, Therapeutic exercise, Endurance training, Patient/family training, Equipment eval/education, Bed mobility, Gait training, Continued evaluation, Spoke to nursing  Equipment Recommended: Walker (pt owns RW at home for use)       See flowsheet documentation for full assessment, interventions and recommendations.  Note: Prognosis: Fair  Problem List: Decreased strength, Decreased range of motion, Decreased endurance, Impaired balance, Decreased mobility, Impaired judgement, Decreased safety awareness, Pain, Orthopedic restrictions, Decreased skin integrity  Assessment: Pt seen for PT per POC. PT goals expiring, no goals met. PT goals updated. Pt continue to demonstrate impaired overall functional mobility. Pt continue to require maxAx1 for rolling in bed however require maxAx2 for supine<>sit & sit<>stand transitions + cues for techniques & safety. Pt only able to tolerate sitting at EOB approx. <10mins. Attempted amb w/ RW but pt unable to tolerate at this time. Pt w/ poor standing balance & tolerance. Pt tolerated above mentioned thera.ex well. Pt require cues for proper exercise techniques and performance.  Please see flowsheet above for objective findings & levels of assistance required for all functional tasks during this tx session. Nsg staff most recent vital signs as follows: /63 (BP Location: Left arm)   Pulse 98   Temp (!) 97.4 °F (36.3 °C) (Temporal)   Resp 20   Ht 5' (1.524 m)   Wt 85.3 kg (188 lb 0.8 oz)   LMP  (LMP Unknown)   SpO2 95%   BMI 36.73 kg/m² . Will continue PT per POC. At end of session, pt back in bed in stable condition, call bell & phone in reach, bed alarm activated. Fall precautions reinforced w/ good understanding. The patient's  AM-PAC Basic Mobility Inpatient Short Form Raw Score is 6. A Raw score of less than or equal to 16 suggests the patient may benefit from discharge to post-acute rehabilitation services. Please also refer to the recommendation of the Physical Therapist for safe discharge planning. From PT standpoint, will continue to recommend Level II (moderate resource intensity) rehab services at D/[facility]. CM to follow. Nsg staff to continue to mobilized pt as tolerated to prevent decline in function. Nsg notified. Co-tx was necessary to complete this PT tx session for the pt's best interest given pt's medical acuity & complexity.        Rehab Resource Intensity Level, PT: II (Moderate Resource Intensity)    See flowsheet documentation for full assessment.

## 2024-11-27 NOTE — ASSESSMENT & PLAN NOTE
Blood pressure remained stable  Patient did have some hypotension 11/23/2024  On beta-blocker.  Metoprolol XL  Avoid elective hypotension

## 2024-11-27 NOTE — ASSESSMENT & PLAN NOTE
Lab Results   Component Value Date    HGBA1C 7.4 (H) 10/12/2024     Recent Labs     11/26/24  1546 11/26/24 2057 11/27/24  0732 11/27/24  1216   POCGLU 153* 225* 153* 195*   Holding Metformin  Continue insulin sliding scale  Added Premeal insulin at 3 units 3 times daily with meals

## 2024-11-28 LAB
ANION GAP SERPL CALCULATED.3IONS-SCNC: 8 MMOL/L (ref 4–13)
BASOPHILS # BLD AUTO: 0.05 THOUSANDS/ΜL (ref 0–0.1)
BASOPHILS NFR BLD AUTO: 1 % (ref 0–1)
BUN SERPL-MCNC: 26 MG/DL (ref 5–25)
CALCIUM SERPL-MCNC: 9.7 MG/DL (ref 8.4–10.2)
CHLORIDE SERPL-SCNC: 104 MMOL/L (ref 96–108)
CO2 SERPL-SCNC: 27 MMOL/L (ref 21–32)
CREAT SERPL-MCNC: 1.44 MG/DL (ref 0.6–1.3)
EOSINOPHIL # BLD AUTO: 0.46 THOUSAND/ΜL (ref 0–0.61)
EOSINOPHIL NFR BLD AUTO: 8 % (ref 0–6)
ERYTHROCYTE [DISTWIDTH] IN BLOOD BY AUTOMATED COUNT: 14.6 % (ref 11.6–15.1)
GFR SERPL CREATININE-BSD FRML MDRD: 34 ML/MIN/1.73SQ M
GLUCOSE SERPL-MCNC: 160 MG/DL (ref 65–140)
GLUCOSE SERPL-MCNC: 171 MG/DL (ref 65–140)
GLUCOSE SERPL-MCNC: 200 MG/DL (ref 65–140)
GLUCOSE SERPL-MCNC: 205 MG/DL (ref 65–140)
GLUCOSE SERPL-MCNC: 223 MG/DL (ref 65–140)
HCT VFR BLD AUTO: 31.5 % (ref 34.8–46.1)
HGB BLD-MCNC: 10 G/DL (ref 11.5–15.4)
IMM GRANULOCYTES # BLD AUTO: 0.04 THOUSAND/UL (ref 0–0.2)
IMM GRANULOCYTES NFR BLD AUTO: 1 % (ref 0–2)
LYMPHOCYTES # BLD AUTO: 2.14 THOUSANDS/ΜL (ref 0.6–4.47)
LYMPHOCYTES NFR BLD AUTO: 35 % (ref 14–44)
MCH RBC QN AUTO: 29.3 PG (ref 26.8–34.3)
MCHC RBC AUTO-ENTMCNC: 31.7 G/DL (ref 31.4–37.4)
MCV RBC AUTO: 92 FL (ref 82–98)
MONOCYTES # BLD AUTO: 0.77 THOUSAND/ΜL (ref 0.17–1.22)
MONOCYTES NFR BLD AUTO: 13 % (ref 4–12)
NEUTROPHILS # BLD AUTO: 2.68 THOUSANDS/ΜL (ref 1.85–7.62)
NEUTS SEG NFR BLD AUTO: 42 % (ref 43–75)
NRBC BLD AUTO-RTO: 0 /100 WBCS
PLATELET # BLD AUTO: 318 THOUSANDS/UL (ref 149–390)
PMV BLD AUTO: 9.8 FL (ref 8.9–12.7)
POTASSIUM SERPL-SCNC: 3.7 MMOL/L (ref 3.5–5.3)
RBC # BLD AUTO: 3.41 MILLION/UL (ref 3.81–5.12)
SODIUM SERPL-SCNC: 139 MMOL/L (ref 135–147)
WBC # BLD AUTO: 6.14 THOUSAND/UL (ref 4.31–10.16)

## 2024-11-28 PROCEDURE — 80048 BASIC METABOLIC PNL TOTAL CA: CPT | Performed by: INTERNAL MEDICINE

## 2024-11-28 PROCEDURE — 99232 SBSQ HOSP IP/OBS MODERATE 35: CPT

## 2024-11-28 PROCEDURE — 82948 REAGENT STRIP/BLOOD GLUCOSE: CPT

## 2024-11-28 PROCEDURE — 99232 SBSQ HOSP IP/OBS MODERATE 35: CPT | Performed by: STUDENT IN AN ORGANIZED HEALTH CARE EDUCATION/TRAINING PROGRAM

## 2024-11-28 PROCEDURE — 99232 SBSQ HOSP IP/OBS MODERATE 35: CPT | Performed by: INTERNAL MEDICINE

## 2024-11-28 PROCEDURE — 85025 COMPLETE CBC W/AUTO DIFF WBC: CPT | Performed by: INTERNAL MEDICINE

## 2024-11-28 RX ORDER — POTASSIUM CHLORIDE 1500 MG/1
40 TABLET, EXTENDED RELEASE ORAL ONCE
Status: COMPLETED | OUTPATIENT
Start: 2024-11-28 | End: 2024-11-28

## 2024-11-28 RX ORDER — MAGNESIUM HYDROXIDE/ALUMINUM HYDROXICE/SIMETHICONE 120; 1200; 1200 MG/30ML; MG/30ML; MG/30ML
30 SUSPENSION ORAL ONCE
Status: COMPLETED | OUTPATIENT
Start: 2024-11-28 | End: 2024-11-28

## 2024-11-28 RX ADMIN — ASPIRIN 81 MG CHEWABLE TABLET 81 MG: 81 TABLET CHEWABLE at 08:30

## 2024-11-28 RX ADMIN — ATORVASTATIN CALCIUM 40 MG: 40 TABLET, FILM COATED ORAL at 17:09

## 2024-11-28 RX ADMIN — ACETAMINOPHEN 975 MG: 325 TABLET, FILM COATED ORAL at 05:30

## 2024-11-28 RX ADMIN — ACETAMINOPHEN 975 MG: 325 TABLET, FILM COATED ORAL at 13:48

## 2024-11-28 RX ADMIN — METOPROLOL SUCCINATE 12.5 MG: 25 TABLET, EXTENDED RELEASE ORAL at 08:30

## 2024-11-28 RX ADMIN — POTASSIUM CHLORIDE 40 MEQ: 1500 TABLET, EXTENDED RELEASE ORAL at 12:09

## 2024-11-28 RX ADMIN — INSULIN LISPRO 2 UNITS: 100 INJECTION, SOLUTION INTRAVENOUS; SUBCUTANEOUS at 12:10

## 2024-11-28 RX ADMIN — GABAPENTIN 300 MG: 300 CAPSULE ORAL at 21:10

## 2024-11-28 RX ADMIN — HEPARIN SODIUM 5000 UNITS: 5000 INJECTION INTRAVENOUS; SUBCUTANEOUS at 13:49

## 2024-11-28 RX ADMIN — METHOCARBAMOL 250 MG: 500 TABLET ORAL at 21:10

## 2024-11-28 RX ADMIN — METHOCARBAMOL 250 MG: 500 TABLET ORAL at 05:30

## 2024-11-28 RX ADMIN — PANTOPRAZOLE SODIUM 40 MG: 40 TABLET, DELAYED RELEASE ORAL at 05:30

## 2024-11-28 RX ADMIN — HYDROMORPHONE HYDROCHLORIDE 2 MG: 2 TABLET ORAL at 17:09

## 2024-11-28 RX ADMIN — INSULIN LISPRO 2 UNITS: 100 INJECTION, SOLUTION INTRAVENOUS; SUBCUTANEOUS at 21:10

## 2024-11-28 RX ADMIN — HYDROMORPHONE HYDROCHLORIDE 2 MG: 2 TABLET ORAL at 12:08

## 2024-11-28 RX ADMIN — MELATONIN 3 MG: 3 TAB ORAL at 21:10

## 2024-11-28 RX ADMIN — INSULIN LISPRO 2 UNITS: 100 INJECTION, SOLUTION INTRAVENOUS; SUBCUTANEOUS at 17:05

## 2024-11-28 RX ADMIN — TORSEMIDE 30 MG: 10 TABLET ORAL at 08:30

## 2024-11-28 RX ADMIN — ALUMINUM HYDROXIDE, MAGNESIUM HYDROXIDE, AND DIMETHICONE 30 ML: 200; 20; 200 SUSPENSION ORAL at 13:48

## 2024-11-28 RX ADMIN — GUAIFENESIN 600 MG: 600 TABLET, EXTENDED RELEASE ORAL at 08:30

## 2024-11-28 RX ADMIN — TORSEMIDE 30 MG: 10 TABLET ORAL at 21:10

## 2024-11-28 RX ADMIN — HEPARIN SODIUM 5000 UNITS: 5000 INJECTION INTRAVENOUS; SUBCUTANEOUS at 05:29

## 2024-11-28 RX ADMIN — INSULIN LISPRO 1 UNITS: 100 INJECTION, SOLUTION INTRAVENOUS; SUBCUTANEOUS at 08:39

## 2024-11-28 RX ADMIN — LEVOTHYROXINE SODIUM 37.5 MCG: 75 TABLET ORAL at 05:30

## 2024-11-28 RX ADMIN — SUCRALFATE 1 G: 1 TABLET ORAL at 05:36

## 2024-11-28 RX ADMIN — SUCRALFATE 1 G: 1 TABLET ORAL at 17:09

## 2024-11-28 RX ADMIN — GUAIFENESIN 600 MG: 600 TABLET, EXTENDED RELEASE ORAL at 21:10

## 2024-11-28 RX ADMIN — HYDROMORPHONE HYDROCHLORIDE 2 MG: 2 TABLET ORAL at 08:30

## 2024-11-28 RX ADMIN — ACETAMINOPHEN 975 MG: 325 TABLET, FILM COATED ORAL at 21:10

## 2024-11-28 RX ADMIN — Medication: at 21:19

## 2024-11-28 RX ADMIN — LIDOCAINE 1 PATCH: 50 PATCH TOPICAL at 08:30

## 2024-11-28 RX ADMIN — HEPARIN SODIUM 5000 UNITS: 5000 INJECTION INTRAVENOUS; SUBCUTANEOUS at 21:10

## 2024-11-28 RX ADMIN — HYDROMORPHONE HYDROCHLORIDE 2 MG: 2 TABLET ORAL at 21:10

## 2024-11-28 NOTE — PROGRESS NOTES
Progress Note - Hospitalist   Name: Mattie Varela 79 y.o. female I MRN: [id number]  Unit/Bed#: E4 -01 I Date of Admission: 11/17/2024   Date of Service: 11/28/2024 I Hospital Day: 11    Assessment & Plan  Chest pain  Complained of chest pain with inspiration  Obtained EKG- without signs of acute ischemia, Trop was 22  Chest x-ray: without pneumonia, effusion, pneumothorax  Patient with risk factors for PE of immobility, recent fracture -PE ruled out with CTA chest  Noted heavy atherosclerotic coronary artery calcification  Last cath was in 2023 with known disease that was treated with medical management  Discussed with cardiology and ongoing medical management recommended  Patella fracture  S/p recent fall at [facility]  CT left knee showing what appears to be a fracture of her bipartite patella   Seen by orthopedic team  Knee immobilizer x 2 weeks with WBAT to LLE   Office follow-up in 2 weeks with repeat x-rays   PT OT recommending moderate resource intensity-level 2    Type 2 diabetes mellitus with other skin complications (HCC)  Lab Results   Component Value Date    HGBA1C 7.4 (H) 10/12/2024     Recent Labs     11/27/24  1643 11/27/24  2135 11/28/24  0715 11/28/24  1101   POCGLU 183* 167* 171* 205*   Holding Metformin  Continue insulin sliding scale  Added Premeal insulin at 3 units 3 times daily with meals  Urinary retention  Had overactive bladder in the past and received Botox.   Continues to have urinary retention requiring ahn catheter placement. For outpatient voiding trial.  Void trial should occur with urology in the outpt setting  UA >100,000  cfu GNR. Was recently treated for Klebsiella cystitis  Without symptoms, fever or leukocytosis & chronic ahn, monitor off abx  Chronic pain disorder  History of chronic pain with degenerative spine and history of spinal stimulator in place (although does not work)  On pain medication in the outpatient setting  PDMP queried- no red flags  The patient has not  found oxycodone helpful.  She will be started on oral hydromorphone.  Muscle relaxants and other sedating drugs will be stopped.  Gabapentin will be increased somewhat.  Hypertension  Home regimen metoprolol succinate 50 mg twice daily  Dosage reduced in the setting of lowish blood pressures  Currently on metoprolol succinate 12.5 mg twice daily-for SBP less than 110  Hypothyroidism  Continue levothyroxine  Insomnia  Zolpidem has been stopped since the patient has been started on hydromorphone.  Degenerative lumbar spinal stenosis    SADAF (acute kidney injury) ([facility])  Torsemide restarted-monitor cr  Acute on chronic diastolic congestive heart failure (HCC)  Wt Readings from Last 3 Encounters:   11/28/24 84.8 kg (186 lb 15.2 oz)   10/18/24 74.3 kg (163 lb 12.8 oz)   10/02/24 85.5 kg (188 lb 9.6 oz)   79 year old female presented with shortness of breath, lower extremity edema, volume overload, and weight gain secondary to acute on chronic diastolic congestive heart failure.  Echo was updated-EF 51%, systolic function low normal, septic akinetic to paradoxical, endocardial detection was significantly limited.  Unable to assess diastolic function  On metoprolol 50 mg twice daily  Down about 6.3 L, admission weight 194 pounds, current weight stable at 176 pounds  The cost of Jardiance is prohibitive.  Continue torsemide 30mg bid    VTE Pharmacologic Prophylaxis: VTE Score: 11 Moderate Risk (Score 3-4) - Pharmacological DVT Prophylaxis Ordered: heparin.    Mobility:   Basic Mobility Inpatient Raw Score: 6  JH-HLM Goal: 2: Bed activities/Dependent transfer  JH-HLM Achieved: 3: Sit at edge of bed  JH-HLM Goal achieved. Continue to encourage appropriate mobility.    Patient Centered Rounds: I performed bedside rounds with nursing staff today.   Discussions with Specialists or Other Care Team Provider:     Education and Discussions with Family / Patient: Patient declined call to .     Current Length of Stay: 11  day(s)  Current Patient Status: Inpatient   Certification Statement: The patient will continue to require additional inpatient hospital stay due to diuretics, creat,   Discharge Plan: Anticipate discharge in 24-48 hrs to discharge location to be determined pending rehab evaluations.    Code Status: Level 1 - Full Code    Subjective   Patient denies any acute complaints today    Objective :  Temp:  [97.6 °F (36.4 °C)-98 °F (36.7 °C)] 97.6 °F (36.4 °C)  HR:  [83-90] 83  BP: (115-136)/(61-63) 126/63  Resp:  [18-20] 18  SpO2:  [95 %-96 %] 95 %  O2 Device: None (Room air)    Body mass index is 36.51 kg/m².     Input and Output Summary (last 24 hours):     Intake/Output Summary (Last 24 hours) at 11/28/2024 1457  Last data filed at 11/28/2024 1348  Gross per 24 hour   Intake 360 ml   Output 1900 ml   Net -1540 ml       Physical Exam  Vitals and nursing note reviewed.   Constitutional:       General: She is not in acute distress.     Appearance: She is well-developed. She is not toxic-appearing or diaphoretic.   HENT:      Head: Normocephalic and atraumatic.   Eyes:      General: No scleral icterus.     Conjunctiva/sclera: Conjunctivae normal.   Cardiovascular:      Rate and Rhythm: Normal rate and regular rhythm.      Heart sounds: No murmur heard.     No friction rub. No gallop.   Pulmonary:      Effort: Pulmonary effort is normal. No respiratory distress.      Breath sounds: Normal breath sounds. No stridor. No wheezing, rhonchi or rales.   Chest:      Chest wall: No tenderness.   Abdominal:      General: There is no distension.      Palpations: Abdomen is soft. There is no mass.      Tenderness: There is no abdominal tenderness. There is no guarding or rebound.      Hernia: No hernia is present.   Musculoskeletal:         General: No swelling or tenderness.      Cervical back: Neck supple.   Skin:     General: Skin is warm and dry.      Capillary Refill: Capillary refill takes less than 2 seconds.   Neurological:       Mental Status: She is alert.   Psychiatric:         Mood and Affect: Mood normal.           Lines/Drains:  Lines/Drains/Airways       Active Status       Name Placement date Placement time Site Days    Urethral Catheter 16 Fr. 10/26/24  1311  --  33                  Urinary Catheter:  Goal for removal: Remove after 48 hrs of I/O monitoring                 Lab Results: I have reviewed the following results:   Results from last 7 days   Lab Units 11/28/24  0528   WBC Thousand/uL 6.14   HEMOGLOBIN g/dL 10.0*   HEMATOCRIT % 31.5*   PLATELETS Thousands/uL 318   SEGS PCT % 42*   LYMPHO PCT % 35   MONO PCT % 13*   EOS PCT % 8*     Results from last 7 days   Lab Units 11/28/24  0528 11/25/24  0539 11/24/24  0618   SODIUM mmol/L 139   < > 133*   POTASSIUM mmol/L 3.7   < > 4.0   CHLORIDE mmol/L 104   < > 99   CO2 mmol/L 27   < > 26   BUN mg/dL 26*   < > 44*   CREATININE mg/dL 1.44*   < > 3.40*   ANION GAP mmol/L 8   < > 8   CALCIUM mg/dL 9.7   < > 9.3   ALBUMIN g/dL  --   --  3.0*   TOTAL BILIRUBIN mg/dL  --   --  0.30   ALK PHOS U/L  --   --  76   ALT U/L  --   --  5*   AST U/L  --   --  13   GLUCOSE RANDOM mg/dL 160*   < > 136    < > = values in this interval not displayed.         Results from last 7 days   Lab Units 11/28/24  1101 11/28/24  0715 11/27/24  2135 11/27/24  1643 11/27/24  1216 11/27/24  0732 11/26/24  2057 11/26/24  1546 11/26/24  1127 11/26/24  0833 11/25/24  2035 11/25/24  1546   POC GLUCOSE mg/dl 205* 171* 167* 183* 195* 153* 225* 153* 171* 144* 201* 251*               Recent Cultures (last 7 days):         Imaging Results Review: No pertinent imaging studies reviewed.  Other Study Results Review: No additional pertinent studies reviewed.    Last 24 Hours Medication List:     Current Facility-Administered Medications:     acetaminophen (TYLENOL) tablet 975 mg, Q8H ARY    albuterol (PROVENTIL HFA,VENTOLIN HFA) inhaler 2 puff, Q6H PRN    aspirin chewable tablet 81 mg, Daily    atorvastatin (LIPITOR) tablet  40 mg, Daily With Dinner    gabapentin (NEURONTIN) capsule 300 mg, HS    guaiFENesin (MUCINEX) 12 hr tablet 600 mg, Q12H ARY    heparin (porcine) subcutaneous injection 5,000 Units, Q8H ARY    HYDROmorphone (DILAUDID) tablet 2 mg, 4x Daily    HYDROmorphone HCl (DILAUDID) injection 0.2 mg, Q4H PRN    insulin lispro (HumALOG/ADMELOG) 100 units/mL subcutaneous injection 1-6 Units, 4x Daily (AC & HS) **AND** Fingerstick Glucose (POCT), 4x Daily AC and at bedtime    latanoprost (XALATAN) 0.005 % ophthalmic solution 1 drop, HS    levalbuterol (XOPENEX) inhalation solution 1.25 mg, Q6H PRN    levothyroxine tablet 37.5 mcg, Early Morning    lidocaine (LIDODERM) 5 % patch 1 patch, Daily    melatonin tablet 3 mg, HS    methocarbamol (ROBAXIN) tablet 250 mg, Q8H PRN    metoprolol succinate (TOPROL-XL) 24 hr tablet 12.5 mg, Daily    pantoprazole (PROTONIX) EC tablet 40 mg, Early Morning    sucralfate (CARAFATE) tablet 1 g, BID AC    torsemide (DEMADEX) tablet 30 mg, BID    white petrolatum-mineral oil (EUCERIN,HYDROCERIN) cream, TID PRN    Administrative Statements   Today, Patient Was Seen By: Manuelito Mccray DO      **Please Note: This note may have been constructed using a voice recognition system.**

## 2024-11-28 NOTE — ASSESSMENT & PLAN NOTE
Etiology: Multifactorial: In the setting of diuresis/intravascular depletion with GI loss/contrast exposure on 11/21/2021 leading to SADAF.  Initially admitted for decompensated heart failure treated with IV diuretics transitioned to oral diuretics, diuretics now on hold.   Admission creatinine 1.23  Baseline creatinine 1.0-1.3.  Creatinine 3.14 on 11/23/2024.   Renal function continues to improve- 1.4.  Status post albumin  Trending BMP.  Workup  CT imaging with kidneys unremarkable no hydronephrosis.  Urinalysis 11/17 with innumerable WBC.  Plan:  Trending BMP.   Okay to resume diuretics, per cardiology.  Avoid relative hypotension.  Follow daily labs

## 2024-11-28 NOTE — ASSESSMENT & PLAN NOTE
HFpEF, LVEF 51%, LVIDd 4.1 cm (May 2024), NYHA Class III, AHA Stage C, etiology is NAVYA  Neurohormonal Blockade:              -- beta blocker: Toprol-XL 12.5 mg BID  -- ACE/ARB/ARNi: none  -- aldosterone antagonist: none   -- SGLT2: Not affordable.  -- Home diuretic: torsemide 30 mg daily  -- IP diuretic: None    Examines euvolemic .   Restarted torsemide yesterday 30 mg by mouth twice a day. Renal function continues to improve  Will follow peripherally now that stable on po meds- please reach out if questions  DC pending rehab eval

## 2024-11-28 NOTE — ASSESSMENT & PLAN NOTE
Lab Results   Component Value Date    HGBA1C 7.4 (H) 10/12/2024     Recent Labs     11/27/24  1643 11/27/24  2135 11/28/24  0715 11/28/24  1101   POCGLU 183* 167* 171* 205*   Holding Metformin  Continue insulin sliding scale  Added Premeal insulin at 3 units 3 times daily with meals

## 2024-11-28 NOTE — ASSESSMENT & PLAN NOTE
History of diastolic heart failure.  Initially treated with IV diuretics transition to oral diuretics. Diuretics were on hold, resumed 11/27.  Currently on torsemide 30 mg p.o. twice daily.  Echocardiogram in May with EF 70%, grade 1 diastolic dysfunction, mild tricuspid regurgitation.  Continue daily weights, fluid restriction, strict I/O.

## 2024-11-28 NOTE — ASSESSMENT & PLAN NOTE
- history of urinary retention with spine stimulator in place   - continues to have urinary retention requiring Bateman catheter   - need outpatient voiding trial   Complex Repair And Rhombic Flap Text: The defect edges were debeveled with a #15 scalpel blade.  The primary defect was closed partially with a complex linear closure.  Given the location of the remaining defect, shape of the defect and the proximity to free margins a rhombic flap was deemed most appropriate for complete closure of the defect.  Using a sterile surgical marker, an appropriate advancement flap was drawn incorporating the defect and placing the expected incisions within the relaxed skin tension lines where possible.    The area thus outlined was incised deep to adipose tissue with a #15 scalpel blade.  The skin margins were undermined to an appropriate distance in all directions utilizing iris scissors.

## 2024-11-28 NOTE — PROGRESS NOTES
Progress Note - Nephrology   Name: Mattie Varela 79 y.o. female I MRN: [id number]  Unit/Bed#: E4 -01 I Date of Admission: 11/17/2024   Date of Service: 11/28/2024 I Hospital Day: 11     Assessment & Plan  SADAF (acute kidney injury) (HCC)  Etiology: Multifactorial: In the setting of diuresis/intravascular depletion with GI loss/contrast exposure on 11/21/2021 leading to SADAF.  Initially admitted for decompensated heart failure treated with IV diuretics transitioned to oral diuretics, diuretics now on hold.   Admission creatinine 1.23  Baseline creatinine 1.0-1.3.  Creatinine 3.14 on 11/23/2024.   Renal function continues to improve- 1.4.  Status post albumin  Trending BMP.  Workup  CT imaging with kidneys unremarkable no hydronephrosis.  Urinalysis 11/17 with innumerable WBC.  Plan:  Trending BMP.   Okay to resume diuretics, per cardiology.  Avoid relative hypotension.  Follow daily labs  Urinary retention  History of urinary retention, spine stimulator in place.  Bateman catheter care per primary team.  Of note, was recently treated in October for Klebsiella cystitis.  Hypertension  Blood pressure remains stable and well controlled.   On beta-blocker.  Metoprolol XL, torsemide 30 mg po BID.  Avoid hypotension or high fluctuation in blood pressure.  Chronic pain disorder  Per primary team-may be contributing to low sodium.  Chest pain  Per cardiology and primary team.  No PE on CT scan.  Acute on chronic diastolic congestive heart failure (HCC)  History of diastolic heart failure.  Initially treated with IV diuretics transition to oral diuretics. Diuretics were on hold, resumed 11/27.  Currently on torsemide 30 mg p.o. twice daily.  Echocardiogram in May with EF 70%, grade 1 diastolic dysfunction, mild tricuspid regurgitation.  Continue daily weights, fluid restriction, strict I/O.        Subjective     Feels well, resting in bed.  Denies chest discomfort or shortness of breath.  Denies nausea or vomiting.   Reports normal appetite.    Objective :  Temp:  [97.6 °F (36.4 °C)-98 °F (36.7 °C)] 97.6 °F (36.4 °C)  HR:  [83-90] 83  BP: (115-136)/(61-63) 126/63  Resp:  [18-20] 18  SpO2:  [95 %-96 %] 95 %  O2 Device: None (Room air)    Current Weight: Weight - Scale: 84.8 kg (186 lb 15.2 oz)  First Weight: Weight - Scale: 88.4 kg (194 lb 14.2 oz)  I/O         11/26 0701  11/27 0700 11/27 0701  11/28 0700 11/28 0701  11/29 0700    P.O. 120      Total Intake(mL/kg) 120 (1.4)      Urine (mL/kg/hr) 1150 (0.6) 1900 (0.9)     Total Output 1150 1900     Net -1030 -1900                  Physical Exam  Vitals reviewed.   Constitutional:       Appearance: She is obese.   HENT:      Head: Normocephalic.      Nose: Nose normal.   Cardiovascular:      Heart sounds: Normal heart sounds.   Pulmonary:      Breath sounds: Normal breath sounds.   Abdominal:      Palpations: Abdomen is soft.   Genitourinary:     Comments: Bateman   Musculoskeletal:      Right lower leg: Edema present.      Left lower leg: Edema present.   Skin:     General: Skin is warm.   Neurological:      General: No focal deficit present.      Mental Status: She is alert. Mental status is at baseline.         Medications:    Current Facility-Administered Medications:     acetaminophen (TYLENOL) tablet 975 mg, 975 mg, Oral, Q8H [facility], Susannah Mcclain PA-C, 975 mg at 11/28/24 0530    albuterol (PROVENTIL HFA,VENTOLIN HFA) inhaler 2 puff, 2 puff, Inhalation, Q6H PRN, Paco Carmona MD, 2 puff at [birth year]/1943    aspirin chewable tablet 81 mg, 81 mg, Oral, Daily, Paco Carmona MD, 81 mg at 11/27/24 0853    atorvastatin (LIPITOR) tablet 40 mg, 40 mg, Oral, Daily With Dinner, Paco Carmona MD, 40 mg at 11/27/24 1703    gabapentin (NEURONTIN) capsule 300 mg, 300 mg, Oral, HS, Khurram Ceron MD, 300 mg at 11/27/24 2215    guaiFENesin (MUCINEX) 12 hr tablet 600 mg, 600 mg, Oral, Q12H [facility], Bi Luke PA-C, 600 mg at 11/27/24 2215    heparin (porcine) subcutaneous injection 5,000  Units, 5,000 Units, Subcutaneous, Q8H ARY, Susannah Mcclain PA-C, 5,000 Units at 11/28/24 0529    HYDROmorphone (DILAUDID) tablet 2 mg, 2 mg, Oral, 4x Daily, Khurram Ceron MD, 2 mg at 11/27/24 2217    HYDROmorphone HCl (DILAUDID) injection 0.2 mg, 0.2 mg, Intravenous, Q4H PRN, Khurram Ceron MD, 0.2 mg at 11/27/24 1923    insulin lispro (HumALOG/ADMELOG) 100 units/mL subcutaneous injection 1-6 Units, 1-6 Units, Subcutaneous, 4x Daily (AC & HS), 1 Units at 11/27/24 2218 **AND** Fingerstick Glucose (POCT), , , 4x Daily AC and at bedtime, NANY Miller    latanoprost (XALATAN) 0.005 % ophthalmic solution 1 drop, 1 drop, Both Eyes, HS, Paco Carmona MD, 1 drop at 11/22/24 2125    levalbuterol (XOPENEX) inhalation solution 1.25 mg, 1.25 mg, Nebulization, Q6H PRN, Perlita Torres MD    levothyroxine tablet 37.5 mcg, 37.5 mcg, Oral, Early Morning, Paco Carmona MD, 37.5 mcg at 11/28/24 0530    lidocaine (LIDODERM) 5 % patch 1 patch, 1 patch, Topical, Daily, Perlita Torres MD, 1 patch at 11/27/24 0856    melatonin tablet 3 mg, 3 mg, Oral, HS, Manuelito Mccray DO, 3 mg at 11/27/24 2217    methocarbamol (ROBAXIN) tablet 250 mg, 250 mg, Oral, Q8H PRN, Manuelito Mccray DO, 250 mg at 11/28/24 0530    metoprolol succinate (TOPROL-XL) 24 hr tablet 12.5 mg, 12.5 mg, Oral, Daily, Jackson Morris MD, 12.5 mg at 11/27/24 0853    pantoprazole (PROTONIX) EC tablet 40 mg, 40 mg, Oral, Early Morning, Paco Carmona MD, 40 mg at 11/28/24 0530    sucralfate (CARAFATE) tablet 1 g, 1 g, Oral, BID AC, Krystina Preston PA-C, 1 g at 11/28/24 0536    torsemide (DEMADEX) tablet 30 mg, 30 mg, Oral, BID, Torey Farnsworth DO, 30 mg at 11/27/24 1605    white petrolatum-mineral oil (EUCERIN,HYDROCERIN) cream, , Topical, TID PRN, Joseph Carrasco PA-C, Given at 11/26/24 0257      Lab Results: I have reviewed the following results:  Results from last 7 days   Lab Units 11/28/24  0528 11/27/24  1830 11/26/24  1246 11/25/24  0514  "11/24/24  0618 11/23/24  1132 11/23/24  0305 11/23/24  0304 11/22/24  0351 11/21/24  0929   WBC Thousand/uL 6.14  --   --  6.90 7.16  --  8.37  --  9.62 9.95   HEMOGLOBIN g/dL 10.0*  --   --  8.5* 9.5*  --  9.0*  --  10.2* 11.4*   HEMATOCRIT % 31.5*  --   --  27.1* 30.8*  --  27.8*  --  31.3* 36.3   PLATELETS Thousands/uL 318  --   --  270 268  --  227  --  275 256   POTASSIUM mmol/L 3.7 4.2 4.4 3.7 4.0 4.1  --  3.2* 3.0* 3.7   CHLORIDE mmol/L 104 106 106 101 99 95*  --  97 94* 95*   CO2 mmol/L 27 25 29 26 26 25  --  27 29 29   BUN mg/dL 26* 28* 34* 40* 44* 40*  --  38* 31* 24   CREATININE mg/dL 1.44* 1.57* 1.97* 2.64* 3.40* 3.14*  --  3.10* 1.78* 1.48*   CALCIUM mg/dL 9.7 9.1 9.4 8.7 9.3 9.1  --  8.8 9.1 9.4   MAGNESIUM mg/dL  --   --   --  2.3  --   --   --  1.7*  --   --    PHOSPHORUS mg/dL  --   --  3.8 4.4*  --   --   --  4.5*  --   --    ALBUMIN g/dL  --   --   --   --  3.0*  --   --  2.8* 3.0* 3.2*       Administrative Statements     Portions of the record may have been created with voice recognition software. Occasional wrong word or \"sound a like\" substitutions may have occurred due to the inherent limitations of voice recognition software. Read the chart carefully and recognize, using context, where substitutions have occurred.If you have any questions, please contact the dictating provider.  "

## 2024-11-28 NOTE — PROGRESS NOTES
Progress Note - Cardiology   Name: Mattie Varela 79 y.o. female I MRN: [id number]  Unit/Bed#: E4 -01 I Date of Admission: 11/17/2024   Date of Service: 11/28/2024 I Hospital Day: 11     Assessment & Plan  Acute on chronic diastolic congestive heart failure (HCC)  HFpEF, LVEF 51%, LVIDd 4.1 cm (May 2024), NYHA Class III, AHA Stage C, etiology is NAVYA  Neurohormonal Blockade:              -- beta blocker: Toprol-XL 12.5 mg BID  -- ACE/ARB/ARNi: none  -- aldosterone antagonist: none   -- SGLT2: Not affordable.  -- Home diuretic: torsemide 30 mg daily  -- IP diuretic: None    Examines euvolemic .   Restarted torsemide yesterday 30 mg by mouth twice a day. Renal function continues to improve  Will follow peripherally now that stable on po meds- please reach out if questions  DC pending rehab eval  Hypertension  Home Toprol was decreased from 50 mg BID to 12.5 mg BID to avoid hypotension  Normotensive now.  Degenerative lumbar spinal stenosis    Type 2 diabetes mellitus with other skin complications ([facility])  - A1c 10/12/24: 7.4  - Home meds metformin 500 mg PO BID-hold while inpatient.  Continue with Humalog insulin  Chronic pain disorder    Hypothyroidism  - continue levothyroxine 37.5 mcg  Urinary retention  - history of urinary retention with spine stimulator in place   - continues to have urinary retention requiring Bateman catheter   - need outpatient voiding trial  Chest pain    Patella fracture  - XR 11/15/24: showing smooth linear lucencies coursing through the superior lateral aspect of the patella, nondisplaced fracture difficult to exclude   - pending CT of lower extremity   - orthopedics following  Insomnia    SADAF (acute kidney injury) ([facility])    Subjective:   HPI  No acute events overnight - stable on the po torsemide - renal function improving still. On room air      Review of Systems: As noted in HPI. Rest of ROS is negative.    Vitals:   /63 (BP Location: Left arm)   Pulse 83   Temp 97.6 °F (36.4  °C) (Temporal)   Resp 18   Ht 5' (1.524 m)   Wt 84.8 kg (186 lb 15.2 oz)   LMP  (LMP Unknown)   SpO2 95%   BMI 36.51 kg/m²   I/O         11/26 0701 11/27 0700 11/27 0701 11/28 0700 11/28 0701 11/29 0700    P.O. 120      Total Intake(mL/kg) 120 (1.4)      Urine (mL/kg/hr) 1150 (0.6) 1900 (0.9)     Total Output 1150 1900     Net -1030 -1900                  Weight (last 2 days)       Date/Time Weight    11/28/24 0600 84.8 (186.95)    11/27/24 0600 85.3 (188.05)    11/26/24 0600 85.1 (187.61)            Physical Exam   Constitutional: awake, alert and oriented, in no acute distress, no obvious deformities, obese female  Head: Normocephalic, without obvious abnormality, atraumatic  Eyes: conjunctivae clear and moist. Sclera anicteric. No xanthelasmas. Pupils equal bilaterally. Extraocular motions are full.  Ear nose mouth and throat: ears are symmetrical bilaterally, hearing appears to be equal bilaterally, no nasal discharge or epistaxis, oropharynx is clear with moist mucous membranes  Neck: Trachea is midline, neck is supple, no thyromegaly or significant lymphadenopathy, there is full range of motion.  Lungs: clear to auscultation bilaterally, no wheezes, no rales, no rhonchi, no accessory muscle use, breathing is nonlabored  Heart: Regular rhythm with a Normal heart rate, S1, S2 normal, No Murmur, no click, rub or gallop, No lower extremity edema  Abdomen: Obese, soft, non-tender; bowel sounds normal; no masses, no organomegaly  Psychiatric: Patient is oriented to time, place, person, mood/affect is negative for depression, anxiety, agitation, appears to have appropriate insight  Skin: Skin is warm, dry, intact. No obvious rashes or lesions on exposed extremities. Nail beds are pink with no cyanosis or clubbing.        Lab Results:  Results from last 7 days   Lab Units 11/28/24  0528   WBC Thousand/uL 6.14   HEMOGLOBIN g/dL 10.0*   HEMATOCRIT % 31.5*   PLATELETS Thousands/uL 318     Results from last 7  days   Lab Units 11/28/24  0528 11/25/24  0539 11/24/24  0618   POTASSIUM mmol/L 3.7   < > 4.0   CHLORIDE mmol/L 104   < > 99   CO2 mmol/L 27   < > 26   BUN mg/dL 26*   < > 44*   CREATININE mg/dL 1.44*   < > 3.40*   CALCIUM mg/dL 9.7   < > 9.3   ALK PHOS U/L  --   --  76   ALT U/L  --   --  5*   AST U/L  --   --  13    < > = values in this interval not displayed.     Results from last 7 days   Lab Units 11/28/24  0528   POTASSIUM mmol/L 3.7   CHLORIDE mmol/L 104   CO2 mmol/L 27   BUN mg/dL 26*   CREATININE mg/dL 1.44*   CALCIUM mg/dL 9.7           Medications:    Current Facility-Administered Medications:     acetaminophen (TYLENOL) tablet 975 mg, 975 mg, Oral, Q8H [facility], Susannah Mcclain PA-C, 975 mg at 11/28/24 0530    albuterol (PROVENTIL HFA,VENTOLIN HFA) inhaler 2 puff, 2 puff, Inhalation, Q6H PRN, Paco Carmona MD, 2 puff at 11/1945    aspirin chewable tablet 81 mg, 81 mg, Oral, Daily, Paco Carmona MD, 81 mg at 11/28/24 0830    atorvastatin (LIPITOR) tablet 40 mg, 40 mg, Oral, Daily With Dinner, Paco Carmona MD, 40 mg at 11/27/24 1703    gabapentin (NEURONTIN) capsule 300 mg, 300 mg, Oral, HS, Khurram Ceron MD, 300 mg at 11/27/24 2215    guaiFENesin (MUCINEX) 12 hr tablet 600 mg, 600 mg, [facility], [facility]12[facility], 600 mg at 11/28/24 0830    heparin (porcine) subcutaneous injection 5,000 Units, 5,000 Units, Subcutaneous, Q8H [facility], 5,000 Units at 11/28/24 0529    HYDROmorphone (DILAUDID) tablet 2 mg, 2 mg, Oral, 4x Daily, Khurram Ceron MD, 2 mg at 11/28/24 0830    HYDROmorphone HCl (DILAUDID) injection 0.2 mg, 0.2 mg, Intravenous, Q4H PRN, Khurram Ceron MD, 0.2 mg at 11/27/24 1923    insulin lispro (HumALOG/ADMELOG) 100 units/mL subcutaneous injection 1-6 Units, 1-6 Units, Subcutaneous, 4x Daily (AC & HS), 1 Units at 11/28/24 0839 **AND** Fingerstick Glucose (POCT), , , 4x Daily AC and at bedtime, NANY Miller    latanoprost (XALATAN) 0.005 % ophthalmic  "solution 1 drop, 1 drop, Both Eyes, HS, Paco Carmona MD, 1 drop at 11/22/24 2125    levalbuterol (XOPENEX) inhalation solution 1.25 mg, 1.25 mg, Nebulization, Q6H PRN, Perlita Torres MD    levothyroxine tablet 37.5 mcg, 37.5 mcg, Oral, Early Morning, Paco Carmona MD, 37.5 mcg at 11/28/24 0530    lidocaine (LIDODERM) 5 % patch 1 patch, 1 patch, Topical, Daily, Perlita Torres MD, 1 patch at 11/28/24 0830    melatonin tablet 3 mg, 3 mg, Oral, HS, Manuelito Mccray DO, 3 mg at 11/27/24 2217    methocarbamol (ROBAXIN) tablet 250 mg, 250 mg, Oral, Q8H PRN, Manuelito Mccray DO, 250 mg at 11/28/24 0530    metoprolol succinate (TOPROL-XL) 24 hr tablet 12.5 mg, 12.5 mg, Oral, Daily, Jackson Morris MD, 12.5 mg at 11/28/24 0830    pantoprazole (PROTONIX) EC tablet 40 mg, 40 mg, Oral, Early Morning, Paco Carmona MD, 40 mg at 11/28/24 0530    sucralfate (CARAFATE) tablet 1 g, 1 g, Oral, BID AC, Krystina Preston PA-C, 1 g at 11/28/24 0536    torsemide (DEMADEX) tablet 30 mg, 30 mg, Oral, BID, Torey Farnsworth DO, 30 mg at 11/28/24 0830    white petrolatum-mineral oil (EUCERIN,HYDROCERIN) cream, , Topical, TID PRN, Joseph Carrasco PA-C, Given at 11/26/24 0257    Portions of the record may have been created with voice recognition software. Occasional wrong word or \"sound a like\" substitutions may have occurred due to the inherent limitations of voice recognition software. Read the chart carefully and recognize, using context, where substitutions have occurred.    Torey Farnsworth DO, FAC, FASNC  11/28/2024 10:38 AM      "

## 2024-11-28 NOTE — ASSESSMENT & PLAN NOTE
Blood pressure remains stable and well controlled.   On beta-blocker.  Metoprolol XL, torsemide 30 mg po BID.  Avoid hypotension or high fluctuation in blood pressure.

## 2024-11-28 NOTE — ASSESSMENT & PLAN NOTE
History of urinary retention, spine stimulator in place.  Bateman catheter care per primary team.  Of note, was recently treated in October for Klebsiella cystitis.

## 2024-11-28 NOTE — ASSESSMENT & PLAN NOTE
Wt Readings from Last 3 Encounters:   11/28/24 84.8 kg (186 lb 15.2 oz)   10/18/24 74.3 kg (163 lb 12.8 oz)   10/02/24 85.5 kg (188 lb 9.6 oz)   79 year old female presented with shortness of breath, lower extremity edema, volume overload, and weight gain secondary to acute on chronic diastolic congestive heart failure.  Echo was updated-EF 51%, systolic function low normal, septic akinetic to paradoxical, endocardial detection was significantly limited.  Unable to assess diastolic function  On metoprolol 50 mg twice daily  Down about 6.3 L, admission weight 194 pounds, current weight stable at 176 pounds  The cost of Jardiance is prohibitive.  Continue torsemide 30mg bid

## 2024-11-29 LAB
ANION GAP SERPL CALCULATED.3IONS-SCNC: 9 MMOL/L (ref 4–13)
BUN SERPL-MCNC: 25 MG/DL (ref 5–25)
CALCIUM SERPL-MCNC: 9.3 MG/DL (ref 8.4–10.2)
CHLORIDE SERPL-SCNC: 102 MMOL/L (ref 96–108)
CO2 SERPL-SCNC: 29 MMOL/L (ref 21–32)
CREAT SERPL-MCNC: 1.49 MG/DL (ref 0.6–1.3)
GFR SERPL CREATININE-BSD FRML MDRD: 32 ML/MIN/1.73SQ M
GLUCOSE SERPL-MCNC: 150 MG/DL (ref 65–140)
GLUCOSE SERPL-MCNC: 162 MG/DL (ref 65–140)
GLUCOSE SERPL-MCNC: 164 MG/DL (ref 65–140)
GLUCOSE SERPL-MCNC: 165 MG/DL (ref 65–140)
GLUCOSE SERPL-MCNC: 206 MG/DL (ref 65–140)
POTASSIUM SERPL-SCNC: 4 MMOL/L (ref 3.5–5.3)
SODIUM SERPL-SCNC: 140 MMOL/L (ref 135–147)

## 2024-11-29 PROCEDURE — 80048 BASIC METABOLIC PNL TOTAL CA: CPT | Performed by: NURSE PRACTITIONER

## 2024-11-29 PROCEDURE — 99232 SBSQ HOSP IP/OBS MODERATE 35: CPT | Performed by: INTERNAL MEDICINE

## 2024-11-29 PROCEDURE — 82948 REAGENT STRIP/BLOOD GLUCOSE: CPT

## 2024-11-29 RX ORDER — POLYETHYLENE GLYCOL 3350 17 G/17G
17 POWDER, FOR SOLUTION ORAL DAILY
Status: DISPENSED | OUTPATIENT
Start: 2024-11-29 | End: 2024-12-02

## 2024-11-29 RX ORDER — AMOXICILLIN 250 MG
1 CAPSULE ORAL 2 TIMES DAILY
Status: DISCONTINUED | OUTPATIENT
Start: 2024-11-29 | End: 2024-12-02 | Stop reason: HOSPADM

## 2024-11-29 RX ADMIN — TORSEMIDE 30 MG: 10 TABLET ORAL at 21:34

## 2024-11-29 RX ADMIN — ATORVASTATIN CALCIUM 40 MG: 40 TABLET, FILM COATED ORAL at 17:05

## 2024-11-29 RX ADMIN — HEPARIN SODIUM 5000 UNITS: 5000 INJECTION INTRAVENOUS; SUBCUTANEOUS at 08:27

## 2024-11-29 RX ADMIN — HYDROMORPHONE HYDROCHLORIDE 2 MG: 2 TABLET ORAL at 17:05

## 2024-11-29 RX ADMIN — INSULIN LISPRO 1 UNITS: 100 INJECTION, SOLUTION INTRAVENOUS; SUBCUTANEOUS at 21:33

## 2024-11-29 RX ADMIN — HEPARIN SODIUM 5000 UNITS: 5000 INJECTION INTRAVENOUS; SUBCUTANEOUS at 21:33

## 2024-11-29 RX ADMIN — INSULIN LISPRO 1 UNITS: 100 INJECTION, SOLUTION INTRAVENOUS; SUBCUTANEOUS at 17:07

## 2024-11-29 RX ADMIN — SUCRALFATE 1 G: 1 TABLET ORAL at 17:06

## 2024-11-29 RX ADMIN — SUCRALFATE 1 G: 1 TABLET ORAL at 06:49

## 2024-11-29 RX ADMIN — HYDROMORPHONE HYDROCHLORIDE 2 MG: 2 TABLET ORAL at 21:33

## 2024-11-29 RX ADMIN — ASPIRIN 81 MG CHEWABLE TABLET 81 MG: 81 TABLET CHEWABLE at 08:59

## 2024-11-29 RX ADMIN — PANTOPRAZOLE SODIUM 40 MG: 40 TABLET, DELAYED RELEASE ORAL at 06:49

## 2024-11-29 RX ADMIN — TORSEMIDE 30 MG: 10 TABLET ORAL at 08:59

## 2024-11-29 RX ADMIN — METOPROLOL SUCCINATE 12.5 MG: 25 TABLET, EXTENDED RELEASE ORAL at 08:59

## 2024-11-29 RX ADMIN — ACETAMINOPHEN 975 MG: 325 TABLET, FILM COATED ORAL at 06:49

## 2024-11-29 RX ADMIN — INSULIN LISPRO 2 UNITS: 100 INJECTION, SOLUTION INTRAVENOUS; SUBCUTANEOUS at 12:50

## 2024-11-29 RX ADMIN — INSULIN LISPRO 1 UNITS: 100 INJECTION, SOLUTION INTRAVENOUS; SUBCUTANEOUS at 09:01

## 2024-11-29 RX ADMIN — METHOCARBAMOL 250 MG: 500 TABLET ORAL at 17:05

## 2024-11-29 RX ADMIN — LEVOTHYROXINE SODIUM 37.5 MCG: 75 TABLET ORAL at 06:49

## 2024-11-29 RX ADMIN — SENNOSIDES AND DOCUSATE SODIUM 1 TABLET: 8.6; 5 TABLET ORAL at 12:55

## 2024-11-29 RX ADMIN — GABAPENTIN 300 MG: 300 CAPSULE ORAL at 21:34

## 2024-11-29 RX ADMIN — HYDROMORPHONE HYDROCHLORIDE 2 MG: 2 TABLET ORAL at 12:50

## 2024-11-29 RX ADMIN — MELATONIN 3 MG: 3 TAB ORAL at 21:34

## 2024-11-29 RX ADMIN — HEPARIN SODIUM 5000 UNITS: 5000 INJECTION INTRAVENOUS; SUBCUTANEOUS at 12:50

## 2024-11-29 RX ADMIN — GUAIFENESIN 600 MG: 600 TABLET, EXTENDED RELEASE ORAL at 08:59

## 2024-11-29 RX ADMIN — SENNOSIDES AND DOCUSATE SODIUM 1 TABLET: 8.6; 5 TABLET ORAL at 17:05

## 2024-11-29 RX ADMIN — ACETAMINOPHEN 975 MG: 325 TABLET, FILM COATED ORAL at 21:33

## 2024-11-29 RX ADMIN — GUAIFENESIN 600 MG: 600 TABLET, EXTENDED RELEASE ORAL at 21:34

## 2024-11-29 RX ADMIN — ACETAMINOPHEN 975 MG: 325 TABLET, FILM COATED ORAL at 12:50

## 2024-11-29 NOTE — PROGRESS NOTES
Progress Note - Hospitalist   Name: Mattie Varela 79 y.o. female I MRN: [id number]  Unit/Bed#: E4 -01 I Date of Admission: 11/17/2024   Date of Service: 11/29/2024 I Hospital Day: 12    Assessment & Plan  Chest pain  Complained of chest pain with inspiration  Obtained EKG- without signs of acute ischemia, Trop was 22  Chest x-ray: without pneumonia, effusion, pneumothorax  Patient with risk factors for PE of immobility, recent fracture -PE ruled out with CTA chest  Noted heavy atherosclerotic coronary artery calcification  Last cath was in 2023 with known disease that was treated with medical management  Discussed with cardiology and ongoing medical management recommended  Patella fracture  S/p recent fall at [facility]  CT left knee showing what appears to be a fracture of her bipartite patella   Seen by orthopedic team  Knee immobilizer x 2 weeks with WBAT to LLE   Office follow-up in 2 weeks with repeat x-rays   PT OT recommending moderate resource intensity-level 2    Type 2 diabetes mellitus with other skin complications (HCC)  Lab Results   Component Value Date    HGBA1C 7.4 (H) 10/12/2024     Recent Labs     11/28/24  1541 11/28/24  2103 11/29/24  0713 11/29/24  1111   POCGLU 223* 200* 150* 206*   Holding Metformin  Continue insulin sliding scale  Added Premeal insulin at 3 units 3 times daily with meals  Urinary retention  Had overactive bladder in the past and received Botox.   Continues to have urinary retention requiring ahn catheter placement. For outpatient voiding trial.  Void trial should occur with urology in the outpt setting  UA >100,000  cfu GNR. Was recently treated for Klebsiella cystitis  Without symptoms, fever or leukocytosis & chronic ahn, monitor off abx  Chronic pain disorder  History of chronic pain with degenerative spine and history of spinal stimulator in place (although does not work)  On pain medication in the outpatient setting  PDMP queried- no red flags  The patient has not  found oxycodone helpful.  She will be started on oral hydromorphone.  Muscle relaxants and other sedating drugs will be stopped.  Gabapentin will be increased somewhat.  Hypertension  Home regimen metoprolol succinate 50 mg twice daily  Dosage reduced in the setting of lowish blood pressures  Currently on metoprolol succinate 12.5 mg twice daily-for SBP less than 110  Hypothyroidism  Continue levothyroxine  Insomnia  Zolpidem has been stopped since the patient has been started on hydromorphone.  Degenerative lumbar spinal stenosis    SADAF (acute kidney injury) ([facility])  Torsemide restarted-monitor cr  Acute on chronic diastolic congestive heart failure ([facility])  Wt Readings from Last 3 Encounters:   11/29/24 85.6 kg (188 lb 11.4 oz)   10/18/24 74.3 kg (163 lb 12.8 oz)   10/02/24 85.5 kg (188 lb 9.6 oz)   79 year old female presented with shortness of breath, lower extremity edema, volume overload, and weight gain secondary to acute on chronic diastolic congestive heart failure.  Echo was updated-EF 51%, systolic function low normal, septic akinetic to paradoxical, endocardial detection was significantly limited.  Unable to assess diastolic function  On metoprolol 50 mg twice daily  Down about 6.3 L, admission weight 194 pounds, current weight stable at 176 pounds  The cost of Jardiance is prohibitive.  Continue torsemide 30mg bid    VTE Pharmacologic Prophylaxis: VTE Score: 11 High Risk (Score >/= 5) - Pharmacological DVT Prophylaxis Ordered: heparin. Sequential Compression Devices Ordered.    Mobility:   Basic Mobility Inpatient Raw Score: 6  JH-HLM Goal: 2: Bed activities/Dependent transfer  JH-HLM Achieved: 3: Sit at edge of bed  JH-HLM Goal achieved. Continue to encourage appropriate mobility.    Patient Centered Rounds: I performed bedside rounds with nursing staff today.   Discussions with Specialists or Other Care Team Provider:     Education and Discussions with Family / Patient: Patient declined call to contact  person.     Current Length of Stay: 12 day(s)  Current Patient Status: Inpatient   Certification Statement: The patient will continue to require additional inpatient hospital stay due to placement  Discharge Plan: Anticipate discharge in 24-48 hrs to rehab facility.    Code Status: Level 1 - Full Code    Subjective   Patient reports not having a bowel movement for several days, initially when she was admitted she had ongoing diarrhea, will start a bowel regimen denies abdominal pain    Objective :  Temp:  [97.1 °F (36.2 °C)-97.7 °F (36.5 °C)] 97.7 °F (36.5 °C)  HR:  [86-90] 86  BP: (105-122)/(56-61) 122/58  Resp:  [18] 18  SpO2:  [95 %] 95 %  O2 Device: None (Room air)    Body mass index is 36.86 kg/m².     Input and Output Summary (last 24 hours):     Intake/Output Summary (Last 24 hours) at 11/29/2024 1503  Last data filed at 11/29/2024 0600  Gross per 24 hour   Intake 240 ml   Output 2500 ml   Net -2260 ml       Physical Exam  Vitals and nursing note reviewed.   Constitutional:       General: She is not in acute distress.     Appearance: She is well-developed. She is not toxic-appearing or diaphoretic.   HENT:      Head: Normocephalic and atraumatic.   Eyes:      General: No scleral icterus.     Conjunctiva/sclera: Conjunctivae normal.   Cardiovascular:      Rate and Rhythm: Normal rate and regular rhythm.      Heart sounds: No murmur heard.     No friction rub. No gallop.   Pulmonary:      Effort: Pulmonary effort is normal. No respiratory distress.      Breath sounds: Normal breath sounds. No stridor. No wheezing, rhonchi or rales.   Chest:      Chest wall: No tenderness.   Abdominal:      General: There is no distension.      Palpations: Abdomen is soft. There is no mass.      Tenderness: There is no abdominal tenderness. There is no guarding or rebound.      Hernia: No hernia is present.   Musculoskeletal:         General: No swelling or tenderness.      Cervical back: Neck supple.   Skin:     General: Skin  is warm and dry.      Capillary Refill: Capillary refill takes less than 2 seconds.   Neurological:      Mental Status: She is alert.   Psychiatric:         Mood and Affect: Mood normal.           Lines/Drains:  Lines/Drains/Airways       Active Status       Name Placement date Placement time Site Days    Urethral Catheter 16 Fr. 10/26/24  1311  --  34                  Urinary Catheter:  Goal for removal: N/A- Discharging with Bateman                 Lab Results: I have reviewed the following results:   Results from last 7 days   Lab Units 11/28/24  0528   WBC Thousand/uL 6.14   HEMOGLOBIN g/dL 10.0*   HEMATOCRIT % 31.5*   PLATELETS Thousands/uL 318   SEGS PCT % 42*   LYMPHO PCT % 35   MONO PCT % 13*   EOS PCT % 8*     Results from last 7 days   Lab Units 11/29/24  0440 11/25/24  0539 11/24/24  0618   SODIUM mmol/L 140   < > 133*   POTASSIUM mmol/L 4.0   < > 4.0   CHLORIDE mmol/L 102   < > 99   CO2 mmol/L 29   < > 26   BUN mg/dL 25   < > 44*   CREATININE mg/dL 1.49*   < > 3.40*   ANION GAP mmol/L 9   < > 8   CALCIUM mg/dL 9.3   < > 9.3   ALBUMIN g/dL  --   --  3.0*   TOTAL BILIRUBIN mg/dL  --   --  0.30   ALK PHOS U/L  --   --  76   ALT U/L  --   --  5*   AST U/L  --   --  13   GLUCOSE RANDOM mg/dL 165*   < > 136    < > = values in this interval not displayed.         Results from last 7 days   Lab Units 11/29/24  1111 11/29/24  0713 11/28/24  2103 11/28/24  1541 11/28/24  1101 11/28/24  0715 11/27/24  2135 11/27/24  1643 11/27/24  1216 11/27/24  0732 11/26/24  2057 11/26/24  1546   POC GLUCOSE mg/dl 206* 150* 200* 223* 205* 171* 167* 183* 195* 153* 225* 153*               Recent Cultures (last 7 days):         Imaging Results Review: No pertinent imaging studies reviewed.  Other Study Results Review: No additional pertinent studies reviewed.    Last 24 Hours Medication List:     Current Facility-Administered Medications:     acetaminophen (TYLENOL) tablet 975 mg, Q8H ARY    albuterol (PROVENTIL HFA,VENTOLIN HFA)  inhaler 2 puff, Q6H PRN    aspirin chewable tablet 81 mg, Daily    atorvastatin (LIPITOR) tablet 40 mg, Daily With Dinner    gabapentin (NEURONTIN) capsule 300 mg, HS    guaiFENesin (MUCINEX) 12 hr tablet 600 mg, Q12H ARY    heparin (porcine) subcutaneous injection 5,000 Units, Q8H ARY    HYDROmorphone (DILAUDID) tablet 2 mg, 4x Daily    HYDROmorphone HCl (DILAUDID) injection 0.2 mg, Q4H PRN    insulin lispro (HumALOG/ADMELOG) 100 units/mL subcutaneous injection 1-6 Units, 4x Daily (AC & HS) **AND** Fingerstick Glucose (POCT), 4x Daily AC and at bedtime    latanoprost (XALATAN) 0.005 % ophthalmic solution 1 drop, HS    levalbuterol (XOPENEX) inhalation solution 1.25 mg, Q6H PRN    levothyroxine tablet 37.5 mcg, Early Morning    lidocaine (LIDODERM) 5 % patch 1 patch, Daily    melatonin tablet 3 mg, HS    methocarbamol (ROBAXIN) tablet 250 mg, Q8H PRN    metoprolol succinate (TOPROL-XL) 24 hr tablet 12.5 mg, Daily    pantoprazole (PROTONIX) EC tablet 40 mg, Early Morning    polyethylene glycol (MIRALAX) packet 17 g, Daily    senna-docusate sodium (SENOKOT S) 8.6-50 mg per tablet 1 tablet, BID    sucralfate (CARAFATE) tablet 1 g, BID AC    torsemide (DEMADEX) tablet 30 mg, BID    white petrolatum-mineral oil (EUCERIN,HYDROCERIN) cream, TID PRN    Administrative Statements   Today, Patient Was Seen By: Manuelito Mccray DO      **Please Note: This note may have been constructed using a voice recognition system.**

## 2024-11-29 NOTE — WOUND OSTOMY CARE
Progress Note - Wound   Mattie A Nichole 79 y.o. female MRN: [id number]  Unit/Bed#: E4 -01 Encounter: [id number]        Assessment:   Patient seen for wound care follow-up.  She is agreeable to assessment.  Dependent for turning/repositioning.  Ahn catheter in place.  However, patient reports chronic ahn leakage--incontinence pad wet on exam, elvira care provided.  Nutrition team following.  Bilateral heels intact and blanchable, left with mild erythema--recommend offloading heel boots.  Skin folds intact.    Buttocks and sacrum intact, macerated with scattered dark recently re-absorbed areas and blanchable hyperpigmentation  Skin under knee immobilizer intact.    Bilateral pretibial--RESOLVED.  Pink, dry, intact.      Skin Care Plan:   1-Silicone cream/Hydraguard lotion to bilateral heels twice daily and as needed.  Eucerin to lower legs 2-3 times per day.  2-Elevate heels off of bed/chair surface--offloading heel boots.  3-Offloading air cushion in chair when out of bed.  4-Moisturize skin daily with skin nourishing cream.  5-Turn/reposition every 2 hours while in bed and weight shift frequently while in chair for pressure re-distribution on skin.  6-[employer] (formerly [employer] BO[employer])oise positioning system.  7-Buttocks/sacrum--cleanse with comfort shield wipes, allow to dry.  Apply zinc oxide paste/Calazime three times daily and as needed with incontinence care.  8-Preventative foam dressing to left posterior tibial to protect from immobilizer.  Change dressing every 3 days and as needed.    Wound care team will sign-off at this time.      Wound 10/11/24 Pretibial Left (Active)   Wound Image   11/29/24 1004   Wound Description Dry;Intact;Pink 11/29/24 1004   Elvira-wound Assessment Intact 11/29/24 1004   Wound Length (cm) 0 cm 11/29/24 1004   Wound Width (cm) 0 cm 11/29/24 1004   Wound Depth (cm) 0 cm 11/29/24 1004   Wound Surface Area (cm^2) 0 cm^2 11/29/24 1004   Wound Volume (cm^3) 0 cm^3 11/29/24 1004   Calculated Wound Volume (cm^3)  0 cm^3 11/29/24 1004   Change in Wound Size % 100 11/29/24 1004   Dressing Open to air 11/29/24 1004   Dressing Status Clean;Dry;Intact 11/29/24 0900       Wound 10/11/24 Pretibial Right (Active)   Wound Image   11/29/24 1005   Wound Description Dry;Intact 11/29/24 1005   Jovana-wound Assessment Intact 11/29/24 1005   Wound Length (cm) 0 cm 11/29/24 1005   Wound Width (cm) 0 cm 11/29/24 1005   Wound Depth (cm) 0 cm 11/29/24 1005   Wound Surface Area (cm^2) 0 cm^2 11/29/24 1005   Wound Volume (cm^3) 0 cm^3 11/29/24 1005   Calculated Wound Volume (cm^3) 0 cm^3 11/29/24 1005   Change in Wound Size % 100 11/29/24 1005   Dressing Open to air 11/29/24 1005       Wound 11/22/24 Sacrum (Active)   Wound Image   11/29/24 1007   Wound Description Other (Comment) 11/29/24 1007   Jovana-wound Assessment Hyperpigmented 11/29/24 1007   Wound Length (cm) 0 cm 11/29/24 1007   Wound Width (cm) 0 cm 11/29/24 1007   Wound Depth (cm) 0 cm 11/29/24 1007   Wound Surface Area (cm^2) 0 cm^2 11/29/24 1007   Wound Volume (cm^3) 0 cm^3 11/29/24 1007   Calculated Wound Volume (cm^3) 0 cm^3 11/29/24 1007   Drainage Amount None 11/29/24 1007   Dressing Protective barrier 11/29/24 1007   Patient Tolerance Tolerated well 11/29/24 1007     Michell Ennis BSN, RN, CWON

## 2024-11-29 NOTE — ASSESSMENT & PLAN NOTE
Wt Readings from Last 3 Encounters:   11/29/24 85.6 kg (188 lb 11.4 oz)   10/18/24 74.3 kg (163 lb 12.8 oz)   10/02/24 85.5 kg (188 lb 9.6 oz)   79 year old female presented with shortness of breath, lower extremity edema, volume overload, and weight gain secondary to acute on chronic diastolic congestive heart failure.  Echo was updated-EF 51%, systolic function low normal, septic akinetic to paradoxical, endocardial detection was significantly limited.  Unable to assess diastolic function  On metoprolol 50 mg twice daily  Down about 6.3 L, admission weight 194 pounds, current weight stable at 176 pounds  The cost of Jardiance is prohibitive.  Continue torsemide 30mg bid

## 2024-11-29 NOTE — NURSING NOTE
RN assumed care of Mattie at 2300 from ARLETTE Coyne.  This RN introduced self to pt, addressed any concerns pt had.  Pt expresses no needs at this time.  Call bed within reach, belongings and bed side table in reach. No further concerns as of present. RN agrees with previous RN assessment.  Plan of care ongoing.

## 2024-11-29 NOTE — ASSESSMENT & PLAN NOTE
Lab Results   Component Value Date    HGBA1C 7.4 (H) 10/12/2024     Recent Labs     11/28/24  1541 11/28/24  2103 11/29/24  0713 11/29/24  1111   POCGLU 223* 200* 150* 206*   Holding Metformin  Continue insulin sliding scale  Added Premeal insulin at 3 units 3 times daily with meals

## 2024-11-30 LAB
ANION GAP SERPL CALCULATED.3IONS-SCNC: 7 MMOL/L (ref 4–13)
BASOPHILS # BLD AUTO: 0.06 THOUSANDS/ΜL (ref 0–0.1)
BASOPHILS NFR BLD AUTO: 1 % (ref 0–1)
BUN SERPL-MCNC: 28 MG/DL (ref 5–25)
CALCIUM SERPL-MCNC: 9.3 MG/DL (ref 8.4–10.2)
CHLORIDE SERPL-SCNC: 99 MMOL/L (ref 96–108)
CO2 SERPL-SCNC: 33 MMOL/L (ref 21–32)
CREAT SERPL-MCNC: 1.56 MG/DL (ref 0.6–1.3)
EOSINOPHIL # BLD AUTO: 0.6 THOUSAND/ΜL (ref 0–0.61)
EOSINOPHIL NFR BLD AUTO: 9 % (ref 0–6)
ERYTHROCYTE [DISTWIDTH] IN BLOOD BY AUTOMATED COUNT: 14.5 % (ref 11.6–15.1)
GFR SERPL CREATININE-BSD FRML MDRD: 30 ML/MIN/1.73SQ M
GLUCOSE SERPL-MCNC: 136 MG/DL (ref 65–140)
GLUCOSE SERPL-MCNC: 148 MG/DL (ref 65–140)
GLUCOSE SERPL-MCNC: 152 MG/DL (ref 65–140)
GLUCOSE SERPL-MCNC: 152 MG/DL (ref 65–140)
GLUCOSE SERPL-MCNC: 169 MG/DL (ref 65–140)
HCT VFR BLD AUTO: 32.9 % (ref 34.8–46.1)
HGB BLD-MCNC: 10.3 G/DL (ref 11.5–15.4)
IMM GRANULOCYTES # BLD AUTO: 0.05 THOUSAND/UL (ref 0–0.2)
IMM GRANULOCYTES NFR BLD AUTO: 1 % (ref 0–2)
LYMPHOCYTES # BLD AUTO: 2.49 THOUSANDS/ΜL (ref 0.6–4.47)
LYMPHOCYTES NFR BLD AUTO: 36 % (ref 14–44)
MCH RBC QN AUTO: 28.5 PG (ref 26.8–34.3)
MCHC RBC AUTO-ENTMCNC: 31.3 G/DL (ref 31.4–37.4)
MCV RBC AUTO: 91 FL (ref 82–98)
MONOCYTES # BLD AUTO: 0.74 THOUSAND/ΜL (ref 0.17–1.22)
MONOCYTES NFR BLD AUTO: 11 % (ref 4–12)
NEUTROPHILS # BLD AUTO: 2.91 THOUSANDS/ΜL (ref 1.85–7.62)
NEUTS SEG NFR BLD AUTO: 42 % (ref 43–75)
NRBC BLD AUTO-RTO: 0 /100 WBCS
PLATELET # BLD AUTO: 354 THOUSANDS/UL (ref 149–390)
PMV BLD AUTO: 9.8 FL (ref 8.9–12.7)
POTASSIUM SERPL-SCNC: 4 MMOL/L (ref 3.5–5.3)
RBC # BLD AUTO: 3.61 MILLION/UL (ref 3.81–5.12)
SODIUM SERPL-SCNC: 139 MMOL/L (ref 135–147)
WBC # BLD AUTO: 6.85 THOUSAND/UL (ref 4.31–10.16)

## 2024-11-30 PROCEDURE — 97110 THERAPEUTIC EXERCISES: CPT

## 2024-11-30 PROCEDURE — 80048 BASIC METABOLIC PNL TOTAL CA: CPT | Performed by: INTERNAL MEDICINE

## 2024-11-30 PROCEDURE — 99232 SBSQ HOSP IP/OBS MODERATE 35: CPT | Performed by: INTERNAL MEDICINE

## 2024-11-30 PROCEDURE — 82948 REAGENT STRIP/BLOOD GLUCOSE: CPT

## 2024-11-30 PROCEDURE — 97530 THERAPEUTIC ACTIVITIES: CPT

## 2024-11-30 PROCEDURE — 85025 COMPLETE CBC W/AUTO DIFF WBC: CPT | Performed by: INTERNAL MEDICINE

## 2024-11-30 RX ADMIN — ATORVASTATIN CALCIUM 40 MG: 40 TABLET, FILM COATED ORAL at 17:06

## 2024-11-30 RX ADMIN — GABAPENTIN 300 MG: 300 CAPSULE ORAL at 21:00

## 2024-11-30 RX ADMIN — MELATONIN 3 MG: 3 TAB ORAL at 21:00

## 2024-11-30 RX ADMIN — ACETAMINOPHEN 975 MG: 325 TABLET, FILM COATED ORAL at 05:07

## 2024-11-30 RX ADMIN — INSULIN LISPRO 1 UNITS: 100 INJECTION, SOLUTION INTRAVENOUS; SUBCUTANEOUS at 23:46

## 2024-11-30 RX ADMIN — LIDOCAINE 1 PATCH: 50 PATCH TOPICAL at 08:08

## 2024-11-30 RX ADMIN — SENNOSIDES AND DOCUSATE SODIUM 1 TABLET: 8.6; 5 TABLET ORAL at 17:06

## 2024-11-30 RX ADMIN — SENNOSIDES AND DOCUSATE SODIUM 1 TABLET: 8.6; 5 TABLET ORAL at 08:07

## 2024-11-30 RX ADMIN — TORSEMIDE 30 MG: 10 TABLET ORAL at 20:58

## 2024-11-30 RX ADMIN — HYDROMORPHONE HYDROCHLORIDE 2 MG: 2 TABLET ORAL at 17:06

## 2024-11-30 RX ADMIN — METHOCARBAMOL 250 MG: 500 TABLET ORAL at 02:01

## 2024-11-30 RX ADMIN — SUCRALFATE 1 G: 1 TABLET ORAL at 17:08

## 2024-11-30 RX ADMIN — METOPROLOL SUCCINATE 12.5 MG: 25 TABLET, EXTENDED RELEASE ORAL at 08:07

## 2024-11-30 RX ADMIN — SUCRALFATE 1 G: 1 TABLET ORAL at 08:07

## 2024-11-30 RX ADMIN — HEPARIN SODIUM 5000 UNITS: 5000 INJECTION INTRAVENOUS; SUBCUTANEOUS at 05:08

## 2024-11-30 RX ADMIN — HYDROMORPHONE HYDROCHLORIDE 2 MG: 2 TABLET ORAL at 21:00

## 2024-11-30 RX ADMIN — LATANOPROST 1 DROP: 50 SOLUTION OPHTHALMIC at 21:01

## 2024-11-30 RX ADMIN — HYDROMORPHONE HYDROCHLORIDE 2 MG: 2 TABLET ORAL at 08:08

## 2024-11-30 RX ADMIN — GUAIFENESIN 600 MG: 600 TABLET, EXTENDED RELEASE ORAL at 08:07

## 2024-11-30 RX ADMIN — HEPARIN SODIUM 5000 UNITS: 5000 INJECTION INTRAVENOUS; SUBCUTANEOUS at 17:06

## 2024-11-30 RX ADMIN — INSULIN LISPRO 1 UNITS: 100 INJECTION, SOLUTION INTRAVENOUS; SUBCUTANEOUS at 12:40

## 2024-11-30 RX ADMIN — HYDROMORPHONE HYDROCHLORIDE 2 MG: 2 TABLET ORAL at 12:40

## 2024-11-30 RX ADMIN — HEPARIN SODIUM 5000 UNITS: 5000 INJECTION INTRAVENOUS; SUBCUTANEOUS at 21:00

## 2024-11-30 RX ADMIN — ACETAMINOPHEN 975 MG: 325 TABLET, FILM COATED ORAL at 17:06

## 2024-11-30 RX ADMIN — ASPIRIN 81 MG CHEWABLE TABLET 81 MG: 81 TABLET CHEWABLE at 08:07

## 2024-11-30 RX ADMIN — GUAIFENESIN 600 MG: 600 TABLET, EXTENDED RELEASE ORAL at 20:57

## 2024-11-30 RX ADMIN — TORSEMIDE 30 MG: 10 TABLET ORAL at 08:08

## 2024-11-30 RX ADMIN — POLYETHYLENE GLYCOL 3350 17 G: 17 POWDER, FOR SOLUTION ORAL at 08:07

## 2024-11-30 RX ADMIN — LEVOTHYROXINE SODIUM 37.5 MCG: 75 TABLET ORAL at 05:07

## 2024-11-30 RX ADMIN — ACETAMINOPHEN 975 MG: 325 TABLET, FILM COATED ORAL at 23:36

## 2024-11-30 RX ADMIN — PANTOPRAZOLE SODIUM 40 MG: 40 TABLET, DELAYED RELEASE ORAL at 05:07

## 2024-11-30 NOTE — PROGRESS NOTES
Progress Note - Hospitalist   Name: Mattie Varela 79 y.o. female I MRN: [id number]  Unit/Bed#: E4 -01 I Date of Admission: 11/17/2024   Date of Service: 11/30/2024 I Hospital Day: 13    Assessment & Plan  Chest pain  Complained of chest pain with inspiration  Obtained EKG- without signs of acute ischemia, Trop was 22  Chest x-ray: without pneumonia, effusion, pneumothorax  Patient with risk factors for PE of immobility, recent fracture -PE ruled out with CTA chest  Noted heavy atherosclerotic coronary artery calcification  Last cath was in 2023 with known disease that was treated with medical management  Discussed with cardiology and ongoing medical management recommended  Patella fracture  S/p recent fall at [facility]  CT left knee showing what appears to be a fracture of her bipartite patella   Seen by orthopedic team  Knee immobilizer x 2 weeks with WBAT to LLE   Office follow-up in 2 weeks with repeat x-rays   PT OT recommending moderate resource intensity-level 2    Type 2 diabetes mellitus with other skin complications (HCC)  Lab Results   Component Value Date    HGBA1C 7.4 (H) 10/12/2024     Recent Labs     11/29/24  1622 11/29/24  2033 11/30/24  0834 11/30/24  1128   POCGLU 162* 164* 136 152*   Holding Metformin  Continue insulin sliding scale  Added Premeal insulin at 3 units 3 times daily with meals  Urinary retention  Had overactive bladder in the past and received Botox.   Continues to have urinary retention requiring ahn catheter placement. For outpatient voiding trial.  Void trial should occur with urology in the outpt setting  UA >100,000  cfu GNR. Was recently treated for Klebsiella cystitis  Without symptoms, fever or leukocytosis & chronic ahn, monitor off abx  Chronic pain disorder  History of chronic pain with degenerative spine and history of spinal stimulator in place (although does not work)  On pain medication in the outpatient setting  PDMP queried- no red flags  The patient has not  found oxycodone helpful.  She will be started on oral hydromorphone.  Muscle relaxants and other sedating drugs will be stopped.  Gabapentin will be increased somewhat.  Hypertension  Home regimen metoprolol succinate 50 mg twice daily  Dosage reduced in the setting of lowish blood pressures  Currently on metoprolol succinate 12.5 mg twice daily-for SBP less than 110  Hypothyroidism  Continue levothyroxine  Insomnia  Zolpidem has been stopped since the patient has been started on hydromorphone.  Degenerative lumbar spinal stenosis    SADAF (acute kidney injury) ([facility])  Torsemide restarted-monitor cr  Acute on chronic diastolic congestive heart failure ([facility])  Wt Readings from Last 3 Encounters:   11/30/24 80.1 kg (176 lb 9.4 oz)   10/18/24 74.3 kg (163 lb 12.8 oz)   10/02/24 85.5 kg (188 lb 9.6 oz)   79 year old female presented with shortness of breath, lower extremity edema, volume overload, and weight gain secondary to acute on chronic diastolic congestive heart failure.  Echo was updated-EF 51%, systolic function low normal, septic akinetic to paradoxical, endocardial detection was significantly limited.  Unable to assess diastolic function  On metoprolol 50 mg twice daily  Down about 6.3 L, admission weight 194 pounds, current weight stable at 176 pounds  The cost of Jardiance is prohibitive.  Continue torsemide 30mg bid    VTE Pharmacologic Prophylaxis: VTE Score: 11 High Risk (Score >/= 5) - Pharmacological DVT Prophylaxis Ordered: heparin. Sequential Compression Devices Ordered.    Mobility:   Basic Mobility Inpatient Raw Score: 6  JH-HLM Goal: 2: Bed activities/Dependent transfer  JH-HLM Achieved: 2: Bed activities/Dependent transfer  JH-HLM Goal achieved. Continue to encourage appropriate mobility.    Patient Centered Rounds: I performed bedside rounds with nursing staff today.   Discussions with Specialists or Other Care Team Provider:     Education and Discussions with Family / Patient: Patient declined call  to .     Current Length of Stay: 13 day(s)  Current Patient Status: Inpatient   Certification Statement: The patient will continue to require additional inpatient hospital stay due to rehab placement  Discharge Plan: Anticipate discharge in 24-48 hrs to rehab facility.    Code Status: Level 1 - Full Code    Subjective   Patient denies any acute complaints apart from her chronic back pain    Objective :  Temp:  [97.5 °F (36.4 °C)-98 °F (36.7 °C)] 97.5 °F (36.4 °C)  HR:  [84-97] 89  BP: ()/(52-91) 109/56  Resp:  [18] 18  SpO2:  [94 %-97 %] 94 %  O2 Device: None (Room air)    Body mass index is 34.49 kg/m².     Input and Output Summary (last 24 hours):     Intake/Output Summary (Last 24 hours) at 11/30/2024 1618  Last data filed at 11/30/2024 1501  Gross per 24 hour   Intake 240 ml   Output 2250 ml   Net -2010 ml       Physical Exam  Vitals and nursing note reviewed.   Constitutional:       General: She is not in acute distress.     Appearance: She is well-developed. She is not toxic-appearing or diaphoretic.   HENT:      Head: Normocephalic and atraumatic.   Eyes:      General: No scleral icterus.     Conjunctiva/sclera: Conjunctivae normal.   Cardiovascular:      Rate and Rhythm: Normal rate and regular rhythm.      Heart sounds: No murmur heard.     No friction rub. No gallop.   Pulmonary:      Effort: Pulmonary effort is normal. No respiratory distress.      Breath sounds: Normal breath sounds. No stridor. No wheezing, rhonchi or rales.   Chest:      Chest wall: No tenderness.   Abdominal:      General: There is no distension.      Palpations: Abdomen is soft. There is no mass.      Tenderness: There is no abdominal tenderness. There is no guarding or rebound.      Hernia: No hernia is present.   Musculoskeletal:         General: No swelling or tenderness.      Cervical back: Neck supple.      Right lower leg: Edema present.      Left lower leg: Edema present.   Skin:     General: Skin is warm  and dry.      Capillary Refill: Capillary refill takes less than 2 seconds.   Neurological:      Mental Status: She is alert and oriented to person, place, and time.   Psychiatric:         Mood and Affect: Mood normal.           Lines/Drains:  Lines/Drains/Airways       Active Status       Name Placement date Placement time Site Days    Urethral Catheter 16 Fr. 10/26/24  1311  --  35                  Urinary Catheter:  Goal for removal: Voiding trial when ambulation improves                 Lab Results: I have reviewed the following results:   Results from last 7 days   Lab Units 11/30/24  0433   WBC Thousand/uL 6.85   HEMOGLOBIN g/dL 10.3*   HEMATOCRIT % 32.9*   PLATELETS Thousands/uL 354   SEGS PCT % 42*   LYMPHO PCT % 36   MONO PCT % 11   EOS PCT % 9*     Results from last 7 days   Lab Units 11/30/24  0433 11/25/24  0539 11/24/24  0618   SODIUM mmol/L 139   < > 133*   POTASSIUM mmol/L 4.0   < > 4.0   CHLORIDE mmol/L 99   < > 99   CO2 mmol/L 33*   < > 26   BUN mg/dL 28*   < > 44*   CREATININE mg/dL 1.56*   < > 3.40*   ANION GAP mmol/L 7   < > 8   CALCIUM mg/dL 9.3   < > 9.3   ALBUMIN g/dL  --   --  3.0*   TOTAL BILIRUBIN mg/dL  --   --  0.30   ALK PHOS U/L  --   --  76   ALT U/L  --   --  5*   AST U/L  --   --  13   GLUCOSE RANDOM mg/dL 152*   < > 136    < > = values in this interval not displayed.         Results from last 7 days   Lab Units 11/30/24  1128 11/30/24  0834 11/29/24  2033 11/29/24  1622 11/29/24  1111 11/29/24  0713 11/28/24  2103 11/28/24  1541 11/28/24  1101 11/28/24  0715 11/27/24  2135 11/27/24  1643   POC GLUCOSE mg/dl 152* 136 164* 162* 206* 150* 200* 223* 205* 171* 167* 183*               Recent Cultures (last 7 days):         Imaging Results Review: No pertinent imaging studies reviewed.  Other Study Results Review: No additional pertinent studies reviewed.    Last 24 Hours Medication List:     Current Facility-Administered Medications:     acetaminophen (TYLENOL) tablet 975 mg, Q8H ARY     albuterol (PROVENTIL HFA,VENTOLIN HFA) inhaler 2 puff, Q6H PRN    aspirin chewable tablet 81 mg, Daily    atorvastatin (LIPITOR) tablet 40 mg, Daily With Dinner    gabapentin (NEURONTIN) capsule 300 mg, HS    guaiFENesin (MUCINEX) 12 hr tablet 600 mg, Q12H ARY    heparin (porcine) subcutaneous injection 5,000 Units, Q8H ARY    HYDROmorphone (DILAUDID) tablet 2 mg, 4x Daily    HYDROmorphone HCl (DILAUDID) injection 0.2 mg, Q4H PRN    insulin lispro (HumALOG/ADMELOG) 100 units/mL subcutaneous injection 1-6 Units, 4x Daily (AC & HS) **AND** Fingerstick Glucose (POCT), 4x Daily AC and at bedtime    latanoprost (XALATAN) 0.005 % ophthalmic solution 1 drop, HS    levalbuterol (XOPENEX) inhalation solution 1.25 mg, Q6H PRN    levothyroxine tablet 37.5 mcg, Early Morning    lidocaine (LIDODERM) 5 % patch 1 patch, Daily    melatonin tablet 3 mg, HS    methocarbamol (ROBAXIN) tablet 250 mg, Q8H PRN    metoprolol succinate (TOPROL-XL) 24 hr tablet 12.5 mg, Daily    pantoprazole (PROTONIX) EC tablet 40 mg, Early Morning    polyethylene glycol (MIRALAX) packet 17 g, Daily    senna-docusate sodium (SENOKOT S) 8.6-50 mg per tablet 1 tablet, BID    sucralfate (CARAFATE) tablet 1 g, BID AC    torsemide (DEMADEX) tablet 30 mg, BID    white petrolatum-mineral oil (EUCERIN,HYDROCERIN) cream, TID PRN    Administrative Statements   Today, Patient Was Seen By: Manuelito Mccray DO      **Please Note: This note may have been constructed using a voice recognition system.**

## 2024-11-30 NOTE — ASSESSMENT & PLAN NOTE
Lab Results   Component Value Date    HGBA1C 7.4 (H) 10/12/2024     Recent Labs     11/29/24  1622 11/29/24 2033 11/30/24  0834 11/30/24  1128   POCGLU 162* 164* 136 152*   Holding Metformin  Continue insulin sliding scale  Added Premeal insulin at 3 units 3 times daily with meals

## 2024-11-30 NOTE — ASSESSMENT & PLAN NOTE
Report to HUE Montes De Oac at Telemetry. PT going to rm 215  Under Dr. Gerber.   Wt Readings from Last 3 Encounters:   11/30/24 80.1 kg (176 lb 9.4 oz)   10/18/24 74.3 kg (163 lb 12.8 oz)   10/02/24 85.5 kg (188 lb 9.6 oz)   79 year old female presented with shortness of breath, lower extremity edema, volume overload, and weight gain secondary to acute on chronic diastolic congestive heart failure.  Echo was updated-EF 51%, systolic function low normal, septic akinetic to paradoxical, endocardial detection was significantly limited.  Unable to assess diastolic function  On metoprolol 50 mg twice daily  Down about 6.3 L, admission weight 194 pounds, current weight stable at 176 pounds  The cost of Jardiance is prohibitive.  Continue torsemide 30mg bid

## 2024-12-01 LAB
GLUCOSE SERPL-MCNC: 146 MG/DL (ref 65–140)
GLUCOSE SERPL-MCNC: 161 MG/DL (ref 65–140)
GLUCOSE SERPL-MCNC: 195 MG/DL (ref 65–140)
GLUCOSE SERPL-MCNC: 218 MG/DL (ref 65–140)

## 2024-12-01 PROCEDURE — 99232 SBSQ HOSP IP/OBS MODERATE 35: CPT | Performed by: INTERNAL MEDICINE

## 2024-12-01 PROCEDURE — 82948 REAGENT STRIP/BLOOD GLUCOSE: CPT

## 2024-12-01 RX ORDER — DIPHENHYDRAMINE HCL 25 MG
12.5 TABLET ORAL EVERY 6 HOURS PRN
Status: DISCONTINUED | OUTPATIENT
Start: 2024-12-01 | End: 2024-12-02 | Stop reason: HOSPADM

## 2024-12-01 RX ADMIN — HYDROMORPHONE HYDROCHLORIDE 2 MG: 2 TABLET ORAL at 17:29

## 2024-12-01 RX ADMIN — SENNOSIDES AND DOCUSATE SODIUM 1 TABLET: 8.6; 5 TABLET ORAL at 08:35

## 2024-12-01 RX ADMIN — HYDROMORPHONE HYDROCHLORIDE 2 MG: 2 TABLET ORAL at 08:35

## 2024-12-01 RX ADMIN — LATANOPROST 1 DROP: 50 SOLUTION OPHTHALMIC at 21:40

## 2024-12-01 RX ADMIN — INSULIN LISPRO 1 UNITS: 100 INJECTION, SOLUTION INTRAVENOUS; SUBCUTANEOUS at 08:44

## 2024-12-01 RX ADMIN — HYDROMORPHONE HYDROCHLORIDE 2 MG: 2 TABLET ORAL at 12:18

## 2024-12-01 RX ADMIN — ASPIRIN 81 MG CHEWABLE TABLET 81 MG: 81 TABLET CHEWABLE at 08:35

## 2024-12-01 RX ADMIN — LEVOTHYROXINE SODIUM 37.5 MCG: 75 TABLET ORAL at 05:03

## 2024-12-01 RX ADMIN — DIPHENHYDRAMINE HYDROCHLORIDE 12.5 MG: 25 TABLET ORAL at 21:27

## 2024-12-01 RX ADMIN — GUAIFENESIN 600 MG: 600 TABLET, EXTENDED RELEASE ORAL at 21:27

## 2024-12-01 RX ADMIN — MELATONIN 3 MG: 3 TAB ORAL at 21:27

## 2024-12-01 RX ADMIN — HEPARIN SODIUM 5000 UNITS: 5000 INJECTION INTRAVENOUS; SUBCUTANEOUS at 21:30

## 2024-12-01 RX ADMIN — METOPROLOL SUCCINATE 12.5 MG: 25 TABLET, EXTENDED RELEASE ORAL at 08:43

## 2024-12-01 RX ADMIN — SENNOSIDES AND DOCUSATE SODIUM 1 TABLET: 8.6; 5 TABLET ORAL at 17:29

## 2024-12-01 RX ADMIN — SUCRALFATE 1 G: 1 TABLET ORAL at 05:08

## 2024-12-01 RX ADMIN — POLYETHYLENE GLYCOL 3350 17 G: 17 POWDER, FOR SOLUTION ORAL at 08:35

## 2024-12-01 RX ADMIN — ACETAMINOPHEN 975 MG: 325 TABLET, FILM COATED ORAL at 05:02

## 2024-12-01 RX ADMIN — PANTOPRAZOLE SODIUM 40 MG: 40 TABLET, DELAYED RELEASE ORAL at 05:05

## 2024-12-01 RX ADMIN — INSULIN LISPRO 2 UNITS: 100 INJECTION, SOLUTION INTRAVENOUS; SUBCUTANEOUS at 17:29

## 2024-12-01 RX ADMIN — HEPARIN SODIUM 5000 UNITS: 5000 INJECTION INTRAVENOUS; SUBCUTANEOUS at 05:11

## 2024-12-01 RX ADMIN — ACETAMINOPHEN 975 MG: 325 TABLET, FILM COATED ORAL at 12:20

## 2024-12-01 RX ADMIN — TORSEMIDE 30 MG: 10 TABLET ORAL at 08:43

## 2024-12-01 RX ADMIN — HEPARIN SODIUM 5000 UNITS: 5000 INJECTION INTRAVENOUS; SUBCUTANEOUS at 12:19

## 2024-12-01 RX ADMIN — ACETAMINOPHEN 975 MG: 325 TABLET, FILM COATED ORAL at 21:29

## 2024-12-01 RX ADMIN — HYDROMORPHONE HYDROCHLORIDE 2 MG: 2 TABLET ORAL at 21:29

## 2024-12-01 RX ADMIN — ATORVASTATIN CALCIUM 40 MG: 40 TABLET, FILM COATED ORAL at 17:29

## 2024-12-01 RX ADMIN — GABAPENTIN 300 MG: 300 CAPSULE ORAL at 21:29

## 2024-12-01 RX ADMIN — GUAIFENESIN 600 MG: 600 TABLET, EXTENDED RELEASE ORAL at 08:35

## 2024-12-01 RX ADMIN — INSULIN LISPRO 2 UNITS: 100 INJECTION, SOLUTION INTRAVENOUS; SUBCUTANEOUS at 12:18

## 2024-12-01 NOTE — ASSESSMENT & PLAN NOTE
Lab Results   Component Value Date    HGBA1C 7.4 (H) 10/12/2024     Recent Labs     11/30/24  1644 11/30/24  2124 12/01/24  0805 12/01/24  1133   POCGLU 148* 169* 161* 218*   Holding Metformin  Continue insulin sliding scale  Added Premeal insulin at 3 units 3 times daily with meals

## 2024-12-01 NOTE — PLAN OF CARE
Problem: Potential for Falls  Goal: Patient will remain free of falls  Description: INTERVENTIONS:  - Educate patient/family on patient safety including physical limitations  - Instruct patient to call for assistance with activity   - Consult OT/PT to assist with strengthening/mobility   - Keep Call bell within reach  - Keep bed low and locked with side rails adjusted as appropriate  - Keep care items and personal belongings within reach  - Initiate and maintain comfort rounds  - Make Fall Risk Sign visible to staff  - Offer Toileting every 2 Hours, in advance of need  - Initiate/Maintain bed alarm  - Obtain necessary fall risk management equipment: alarm   - Apply yellow socks and bracelet for high fall risk patients  - Consider moving patient to room near nurses station  Outcome: Progressing     Problem: PAIN - ADULT  Goal: Verbalizes/displays adequate comfort level or baseline comfort level  Description: Interventions:  - Encourage patient to monitor pain and request assistance  - Assess pain using appropriate pain scale  - Administer analgesics based on type and severity of pain and evaluate response  - Implement non-pharmacological measures as appropriate and evaluate response  - Consider cultural and social influences on pain and pain management  - Notify physician/advanced practitioner if interventions unsuccessful or patient reports new pain  Outcome: Progressing     Problem: SAFETY ADULT  Goal: Patient will remain free of falls  Description: INTERVENTIONS:  - Educate patient/family on patient safety including physical limitations  - Instruct patient to call for assistance with activity   - Consult OT/PT to assist with strengthening/mobility   - Keep Call bell within reach  - Keep bed low and locked with side rails adjusted as appropriate  - Keep care items and personal belongings within reach  - Initiate and maintain comfort rounds  - Make Fall Risk Sign visible to staff  - Offer Toileting every 2 Hours,  in advance of need  - Initiate/Maintain bed alarm  - Obtain necessary fall risk management equipment: alarm   - Apply yellow socks and bracelet for high fall risk patients  - Consider moving patient to room near nurses station  Outcome: Progressing  Goal: Maintain or return to baseline ADL function  Description: INTERVENTIONS:  -  Assess patient's ability to carry out ADLs; assess patient's baseline for ADL function and identify physical deficits which impact ability to perform ADLs (bathing, care of mouth/teeth, toileting, grooming, dressing, etc.)  - Assess/evaluate cause of self-care deficits   - Assess range of motion  - Assess patient's mobility; develop plan if impaired  - Assess patient's need for assistive devices and provide as appropriate  - Encourage maximum independence but intervene and supervise when necessary  - Involve family in performance of ADLs  - Assess for home care needs following discharge   - Consider OT consult to assist with ADL evaluation and planning for discharge  - Provide patient education as appropriate  Outcome: Progressing  Goal: Maintains/Returns to pre admission functional level  Description: INTERVENTIONS:  - Perform AM-PAC 6 Click Basic Mobility/ Daily Activity assessment daily.  - Set and communicate daily mobility goal to care team and patient/family/caregiver.   - Record patient progress and toleration of activity level   Outcome: Progressing     Problem: DISCHARGE PLANNING  Goal: Discharge to home or other facility with appropriate resources  Description: INTERVENTIONS:  - Identify barriers to discharge w/patient and caregiver  - Arrange for needed discharge resources and transportation as appropriate  - Identify discharge learning needs (meds, wound care, etc.)  - Arrange for interpretive services to assist at discharge as needed  - Refer to Case Management Department for coordinating discharge planning if the patient needs post-hospital services based on  physician/advanced practitioner order or complex needs related to functional status, cognitive ability, or social support system  Outcome: Progressing     Problem: Knowledge Deficit  Goal: Patient/family/caregiver demonstrates understanding of disease process, treatment plan, medications, and discharge instructions  Description: Complete learning assessment and assess knowledge base.  Interventions:  - Provide teaching at level of understanding  - Provide teaching via preferred learning methods  Outcome: Progressing     Problem: CARDIOVASCULAR - ADULT  Goal: Maintains optimal cardiac output and hemodynamic stability  Description: INTERVENTIONS:  - Monitor I/O, vital signs and rhythm  - Monitor for S/S and trends of decreased cardiac output  - Administer and titrate ordered vasoactive medications to optimize hemodynamic stability  - Assess quality of pulses, skin color and temperature  - Assess for signs of decreased coronary artery perfusion  - Instruct patient to report change in severity of symptoms  Outcome: Progressing  Goal: Absence of cardiac dysrhythmias or at baseline rhythm  Description: INTERVENTIONS:  - Continuous cardiac monitoring, vital signs, obtain 12 lead EKG if ordered  - Administer antiarrhythmic and heart rate control medications as ordered  - Monitor electrolytes and administer replacement therapy as ordered  Outcome: Progressing     Problem: RESPIRATORY - ADULT  Goal: Achieves optimal ventilation and oxygenation  Description: INTERVENTIONS:  - Assess for changes in respiratory status  - Assess for changes in mentation and behavior  - Position to facilitate oxygenation and minimize respiratory effort  - Oxygen administered by appropriate delivery if ordered  - Initiate smoking cessation education as indicated  - Encourage broncho-pulmonary hygiene including cough, deep breathe, Incentive Spirometry  - Assess the need for suctioning and aspirate as needed  - Assess and instruct to report SOB or  any respiratory difficulty  - Respiratory Therapy support as indicated  Outcome: Progressing     Problem: Prexisting or High Potential for Compromised Skin Integrity  Goal: Skin integrity is maintained or improved  Description: INTERVENTIONS:  - Identify patients at risk for skin breakdown  - Assess and monitor skin integrity  - Assess and monitor nutrition and hydration status  - Monitor labs   - Assess for incontinence   - Turn and reposition patient  - Assist with mobility/ambulation  - Relieve pressure over bony prominences  - Avoid friction and shearing  - Provide appropriate hygiene as needed including keeping skin clean and dry  - Evaluate need for skin moisturizer/barrier cream  - Collaborate with interdisciplinary team   - Patient/family teaching  - Consider wound care consult   Outcome: Progressing

## 2024-12-01 NOTE — ASSESSMENT & PLAN NOTE
Wt Readings from Last 3 Encounters:   12/01/24 80 kg (176 lb 5.9 oz)   10/18/24 74.3 kg (163 lb 12.8 oz)   10/02/24 85.5 kg (188 lb 9.6 oz)   79 year old female presented with shortness of breath, lower extremity edema, volume overload, and weight gain secondary to acute on chronic diastolic congestive heart failure.  Echo was updated-EF 51%, systolic function low normal, septic akinetic to paradoxical, endocardial detection was significantly limited.  Unable to assess diastolic function  On metoprolol 50 mg twice daily  Down about 6.3 L, admission weight 194 pounds, current weight stable at 176 pounds  The cost of Jardiance is prohibitive.  Continue torsemide 30mg bid

## 2024-12-01 NOTE — PLAN OF CARE
Problem: PHYSICAL THERAPY ADULT  Goal: Performs mobility at highest level of function for planned discharge setting.  See evaluation for individualized goals.  Description: Treatment/Interventions: Functional transfer training, LE strengthening/ROM, Therapeutic exercise, Endurance training, Patient/family training, Equipment eval/education, Bed mobility, Gait training, Continued evaluation, Spoke to nursing  Equipment Recommended: Walker (pt owns RW at home for use)       See flowsheet documentation for full assessment, interventions and recommendations.  Outcome: Progressing  Note: Prognosis: Fair  Problem List: Decreased range of motion, Decreased endurance, Decreased mobility, Pain, Obesity, Orthopedic restrictions, Impaired judgement, Decreased cognition  Assessment: Pt. given decreased A for all mobility. Pt. needed much encouragement and cues to relax. pt. tend to be anxious and fearful of pain. Donned KI on patient during the session. Pt. noted with self limting behaviors due to fear of pain and fall. Pt. able to perform AROM/AAROM of LEs in supine. Pt. able to stand only for ~15 sec though no LE instability or knee buckling noted however pt. was extremly anxious and fearful and wanted to sit back down. Pt. was given total A for donning KI on LLE in supine. Pt. reported no  increased discomfort post session and was comfortably positioned s/l on the R side at the end of session with all needs within reach and bed alarm engaged. Will continue to follow per PT POC. Pt. limiting her mobility due to pain in her back.  Barriers to Discharge: None     Rehab Resource Intensity Level, PT: II (Moderate Resource Intensity)    See flowsheet documentation for full assessment.

## 2024-12-01 NOTE — PHYSICAL THERAPY NOTE
Physical Therapy Treatment Note     11/30/24 1513   PT Last Visit   PT Visit Date 11/30/24   Note Type   Note Type Treatment   Pain Assessment   Pain Assessment Tool 0-10   Pain Score 10 - Worst Possible Pain   Pain Location/Orientation Location: Back   Restrictions/Precautions   Weight Bearing Precautions Per Order Yes   LLE Weight Bearing Per Order WBAT   Braces or Orthoses Knee immobilizer  (on LLE)   Other Precautions Contact/isolation;WBS;Fall Risk;Pain;Multiple lines;Bed Alarm   General   Chart Reviewed Yes   Family/Caregiver Present No   Subjective   Subjective Pt. agreeable to PT   Bed Mobility   Rolling R 3  Moderate assistance   Additional items Assist x 1;Bedrails;Increased time required;Verbal cues;LE management   Supine to Sit 2  Maximal assistance   Additional items Assist x 1;HOB elevated;Bedrails;Increased time required;LE management;Verbal cues   Sit to Supine 3  Moderate assistance   Additional items Assist x 2;Increased time required;LE management;Verbal cues   Transfers   Sit to Stand 3  Moderate assistance   Additional items Assist x 2;Increased time required;Verbal cues   Stand to Sit 3  Moderate assistance   Additional items Assist x 2;Increased time required;Verbal cues   Balance   Static Sitting Fair   Dynamic Sitting Fair   Static Standing Poor   Dynamic Standing Poor -   Ambulatory Zero   Endurance Deficit   Endurance Deficit Yes   Endurance Deficit Description pain   Activity Tolerance   Activity Tolerance Patient tolerated treatment well;Patient limited by pain   Nurse Made Aware yes   Exercises   THR Supine;Bilateral;AAROM;AROM;20 reps;10 reps   Assessment   Prognosis Fair   Problem List Decreased range of motion;Decreased endurance;Decreased mobility;Pain;Obesity;Orthopedic restrictions;Impaired judgement;Decreased cognition   Assessment Pt. given decreased A for all mobility. Pt. needed much encouragement and cues to relax. pt. tend to be anxious and fearful of pain. Donned KI on  patient during the session. Pt. noted with self limting behaviors due to fear of pain and fall. Pt. able to perform AROM/AAROM of LEs in supine. Pt. able to stand only for ~15 sec though no LE instability or knee buckling noted however pt. was extremly anxious and fearful and wanted to sit back down. Pt. was given total A for donning KI on LLE in supine. Pt. reported no  increased discomfort post session and was comfortably positioned s/l on the R side at the end of session with all needs within reach and bed alarm engaged. Will continue to follow per PT POC. Pt. limiting her mobility due to pain in her back.   Barriers to Discharge None   Goals   Patient Goals None reported   STG Expiration Date 12/11/24   PT Treatment Day 5   Plan   Treatment/Interventions Functional transfer training;LE strengthening/ROM;Therapeutic exercise;Spoke to nursing;Bed mobility;Equipment eval/education;Patient/family training   Progress Slow progress, decreased activity tolerance   PT Frequency 2-3x/wk   Discharge Recommendation   Rehab Resource Intensity Level, PT II (Moderate Resource Intensity)   Equipment Recommended Walker   AM-PAC Basic Mobility Inpatient   Turning in Flat Bed Without Bedrails 1   Lying on Back to Sitting on Edge of Flat Bed Without Bedrails 1   Moving Bed to Chair 2   Standing Up From Chair Using Arms 2   Walk in Room 2   Climb 3-5 Stairs With Railing 1   Basic Mobility Inpatient Raw Score 9   Turning Head Towards Sound 4   Follow Simple Instructions 4   Low Function Basic Mobility Raw Score  17   Low Function Basic Mobility Standardized Score  27.46   [facility] Highest Level Of Mobility   -[facility] Goal 3: Sit at edge of bed   -HL Achieved 3: Sit at edge of bed   End of Consult   Patient Position at End of Consult Supine;All needs within reach;Bed/Chair alarm activated           Heaven Harper PTA    An AM-PAC basic mobility standardized score less than 42.9 suggest the patient may benefit from discharge to  post-acute rehab services.

## 2024-12-01 NOTE — PLAN OF CARE
Problem: Potential for Falls  Goal: Patient will remain free of falls  Description: INTERVENTIONS:  - Educate patient/family on patient safety including physical limitations  - Instruct patient to call for assistance with activity   - Consult OT/PT to assist with strengthening/mobility   - Keep Call bell within reach  - Keep bed low and locked with side rails adjusted as appropriate  - Keep care items and personal belongings within reach  - Initiate and maintain comfort rounds  - Make Fall Risk Sign visible to staff  - Offer Toileting every 2 Hours, in advance of need  - Initiate/Maintain bed alarm  - Obtain necessary fall risk management equipment  - Apply yellow socks and bracelet for high fall risk patients  - Consider moving patient to room near nurses station  Outcome: Progressing     Problem: PAIN - ADULT  Goal: Verbalizes/displays adequate comfort level or baseline comfort level  Description: Interventions:  - Encourage patient to monitor pain and request assistance  - Assess pain using appropriate pain scale  - Administer analgesics based on type and severity of pain and evaluate response  - Implement non-pharmacological measures as appropriate and evaluate response  - Consider cultural and social influences on pain and pain management  - Notify physician/advanced practitioner if interventions unsuccessful or patient reports new pain  Outcome: Progressing     Problem: SAFETY ADULT  Goal: Patient will remain free of falls  Description: INTERVENTIONS:  - Educate patient/family on patient safety including physical limitations  - Instruct patient to call for assistance with activity   - Consult OT/PT to assist with strengthening/mobility   - Keep Call bell within reach  - Keep bed low and locked with side rails adjusted as appropriate  - Keep care items and personal belongings within reach  - Initiate and maintain comfort rounds  - Make Fall Risk Sign visible to staff  - Offer Toileting every 2 Hours, in  advance of need  - Initiate/Maintain bed alarm  - Obtain necessary fall risk management equipment  - Apply yellow socks and bracelet for high fall risk patients  - Consider moving patient to room near nurses station  Outcome: Progressing  Goal: Maintain or return to baseline ADL function  Description: INTERVENTIONS:  -  Assess patient's ability to carry out ADLs; assess patient's baseline for ADL function and identify physical deficits which impact ability to perform ADLs (bathing, care of mouth/teeth, toileting, grooming, dressing, etc.)  - Assess/evaluate cause of self-care deficits   - Assess range of motion  - Assess patient's mobility; develop plan if impaired  - Assess patient's need for assistive devices and provide as appropriate  - Encourage maximum independence but intervene and supervise when necessary  - Involve family in performance of ADLs  - Assess for home care needs following discharge   - Consider OT consult to assist with ADL evaluation and planning for discharge  - Provide patient education as appropriate  Outcome: Progressing  Goal: Maintains/Returns to pre admission functional level  Description: INTERVENTIONS:  - Perform AM-PAC 6 Click Basic Mobility/ Daily Activity assessment daily.  - Set and communicate daily mobility goal to care team and patient/family/caregiver.   - Collaborate with rehabilitation services on mobility goals if consulted  - Perform Range of Motion 3 times a day.  - Reposition patient every 2 hours.  - Dangle patient 3 times a day  - Stand patient 3 times a day  - Record patient progress and toleration of activity level   Outcome: Progressing     Problem: DISCHARGE PLANNING  Goal: Discharge to home or other facility with appropriate resources  Description: INTERVENTIONS:  - Identify barriers to discharge w/patient and caregiver  - Arrange for needed discharge resources and transportation as appropriate  - Identify discharge learning needs (meds, wound care, etc.)  -  Arrange for interpretive services to assist at discharge as needed  - Refer to Case Management Department for coordinating discharge planning if the patient needs post-hospital services based on physician/advanced practitioner order or complex needs related to functional status, cognitive ability, or social support system  Outcome: Progressing     Problem: Knowledge Deficit  Goal: Patient/family/caregiver demonstrates understanding of disease process, treatment plan, medications, and discharge instructions  Description: Complete learning assessment and assess knowledge base.  Interventions:  - Provide teaching at level of understanding  - Provide teaching via preferred learning methods  Outcome: Progressing     Problem: CARDIOVASCULAR - ADULT  Goal: Maintains optimal cardiac output and hemodynamic stability  Description: INTERVENTIONS:  - Monitor I/O, vital signs and rhythm  - Monitor for S/S and trends of decreased cardiac output  - Administer and titrate ordered vasoactive medications to optimize hemodynamic stability  - Assess quality of pulses, skin color and temperature  - Assess for signs of decreased coronary artery perfusion  - Instruct patient to report change in severity of symptoms  Outcome: Progressing  Goal: Absence of cardiac dysrhythmias or at baseline rhythm  Description: INTERVENTIONS:  - Continuous cardiac monitoring, vital signs, obtain 12 lead EKG if ordered  - Administer antiarrhythmic and heart rate control medications as ordered  - Monitor electrolytes and administer replacement therapy as ordered  Outcome: Progressing     Problem: RESPIRATORY - ADULT  Goal: Achieves optimal ventilation and oxygenation  Description: INTERVENTIONS:  - Assess for changes in respiratory status  - Assess for changes in mentation and behavior  - Position to facilitate oxygenation and minimize respiratory effort  - Oxygen administered by appropriate delivery if ordered  - Initiate smoking cessation education as  indicated  - Encourage broncho-pulmonary hygiene including cough, deep breathe, Incentive Spirometry  - Assess the need for suctioning and aspirate as needed  - Assess and instruct to report SOB or any respiratory difficulty  - Respiratory Therapy support as indicated  Outcome: Progressing     Problem: Prexisting or High Potential for Compromised Skin Integrity  Goal: Skin integrity is maintained or improved  Description: INTERVENTIONS:  - Identify patients at risk for skin breakdown  - Assess and monitor skin integrity  - Assess and monitor nutrition and hydration status  - Monitor labs   - Assess for incontinence   - Turn and reposition patient  - Assist with mobility/ambulation  - Relieve pressure over bony prominences  - Avoid friction and shearing  - Provide appropriate hygiene as needed including keeping skin clean and dry  - Evaluate need for skin moisturizer/barrier cream  - Collaborate with interdisciplinary team   - Patient/family teaching  - Consider wound care consult   Outcome: Progressing

## 2024-12-02 ENCOUNTER — APPOINTMENT (INPATIENT)
Dept: RADIOLOGY | Facility: HOSPITAL | Age: 79
DRG: 562 | End: 2024-12-02
Payer: MEDICARE

## 2024-12-02 VITALS
HEIGHT: 60 IN | BODY MASS INDEX: 35.19 KG/M2 | HEART RATE: 91 BPM | DIASTOLIC BLOOD PRESSURE: 61 MMHG | SYSTOLIC BLOOD PRESSURE: 132 MMHG | OXYGEN SATURATION: 94 % | TEMPERATURE: 97.2 F | WEIGHT: 179.23 LBS | RESPIRATION RATE: 18 BRPM

## 2024-12-02 PROBLEM — R07.9 CHEST PAIN: Status: RESOLVED | Noted: 2024-10-11 | Resolved: 2024-12-02

## 2024-12-02 LAB
ANION GAP SERPL CALCULATED.3IONS-SCNC: 6 MMOL/L (ref 4–13)
BUN SERPL-MCNC: 32 MG/DL (ref 5–25)
CALCIUM SERPL-MCNC: 9.6 MG/DL (ref 8.4–10.2)
CHLORIDE SERPL-SCNC: 96 MMOL/L (ref 96–108)
CO2 SERPL-SCNC: 36 MMOL/L (ref 21–32)
CREAT SERPL-MCNC: 1.54 MG/DL (ref 0.6–1.3)
GFR SERPL CREATININE-BSD FRML MDRD: 31 ML/MIN/1.73SQ M
GLUCOSE SERPL-MCNC: 135 MG/DL (ref 65–140)
GLUCOSE SERPL-MCNC: 142 MG/DL (ref 65–140)
GLUCOSE SERPL-MCNC: 156 MG/DL (ref 65–140)
POTASSIUM SERPL-SCNC: 3.7 MMOL/L (ref 3.5–5.3)
SODIUM SERPL-SCNC: 138 MMOL/L (ref 135–147)

## 2024-12-02 PROCEDURE — 73560 X-RAY EXAM OF KNEE 1 OR 2: CPT

## 2024-12-02 PROCEDURE — 99239 HOSP IP/OBS DSCHRG MGMT >30: CPT | Performed by: INTERNAL MEDICINE

## 2024-12-02 PROCEDURE — NC001 PR NO CHARGE: Performed by: INTERNAL MEDICINE

## 2024-12-02 PROCEDURE — 82948 REAGENT STRIP/BLOOD GLUCOSE: CPT

## 2024-12-02 PROCEDURE — 80048 BASIC METABOLIC PNL TOTAL CA: CPT | Performed by: INTERNAL MEDICINE

## 2024-12-02 RX ORDER — BISACODYL 10 MG
10 SUPPOSITORY, RECTAL RECTAL DAILY PRN
Status: DISCONTINUED | OUTPATIENT
Start: 2024-12-02 | End: 2024-12-02 | Stop reason: HOSPADM

## 2024-12-02 RX ORDER — BACLOFEN 10 MG/1
10 TABLET ORAL 2 TIMES DAILY PRN
Start: 2024-12-02

## 2024-12-02 RX ORDER — TORSEMIDE 10 MG/1
30 TABLET ORAL 2 TIMES DAILY
Start: 2024-12-02

## 2024-12-02 RX ORDER — HYDROMORPHONE HYDROCHLORIDE 2 MG/1
2 TABLET ORAL EVERY 6 HOURS PRN
Qty: 12 TABLET | Refills: 0 | Status: SHIPPED | OUTPATIENT
Start: 2024-12-02

## 2024-12-02 RX ORDER — METOPROLOL SUCCINATE 25 MG/1
12.5 TABLET, EXTENDED RELEASE ORAL DAILY
Start: 2024-12-03

## 2024-12-02 RX ADMIN — HEPARIN SODIUM 5000 UNITS: 5000 INJECTION INTRAVENOUS; SUBCUTANEOUS at 12:50

## 2024-12-02 RX ADMIN — TORSEMIDE 30 MG: 10 TABLET ORAL at 08:34

## 2024-12-02 RX ADMIN — PANTOPRAZOLE SODIUM 40 MG: 40 TABLET, DELAYED RELEASE ORAL at 05:16

## 2024-12-02 RX ADMIN — SUCRALFATE 1 G: 1 TABLET ORAL at 05:34

## 2024-12-02 RX ADMIN — ACETAMINOPHEN 975 MG: 325 TABLET, FILM COATED ORAL at 12:50

## 2024-12-02 RX ADMIN — SENNOSIDES AND DOCUSATE SODIUM 1 TABLET: 8.6; 5 TABLET ORAL at 08:34

## 2024-12-02 RX ADMIN — HYDROMORPHONE HYDROCHLORIDE 2 MG: 2 TABLET ORAL at 08:34

## 2024-12-02 RX ADMIN — METHYLNALTREXONE BROMIDE 6 MG: 12 INJECTION, SOLUTION SUBCUTANEOUS at 12:53

## 2024-12-02 RX ADMIN — HEPARIN SODIUM 5000 UNITS: 5000 INJECTION INTRAVENOUS; SUBCUTANEOUS at 05:19

## 2024-12-02 RX ADMIN — INSULIN LISPRO 1 UNITS: 100 INJECTION, SOLUTION INTRAVENOUS; SUBCUTANEOUS at 08:51

## 2024-12-02 RX ADMIN — GUAIFENESIN 600 MG: 600 TABLET, EXTENDED RELEASE ORAL at 08:34

## 2024-12-02 RX ADMIN — ASPIRIN 81 MG CHEWABLE TABLET 81 MG: 81 TABLET CHEWABLE at 08:34

## 2024-12-02 RX ADMIN — LEVOTHYROXINE SODIUM 37.5 MCG: 75 TABLET ORAL at 05:16

## 2024-12-02 RX ADMIN — HYDROMORPHONE HYDROCHLORIDE 2 MG: 2 TABLET ORAL at 12:50

## 2024-12-02 RX ADMIN — METHOCARBAMOL 250 MG: 500 TABLET ORAL at 03:29

## 2024-12-02 RX ADMIN — METOPROLOL SUCCINATE 12.5 MG: 25 TABLET, EXTENDED RELEASE ORAL at 08:34

## 2024-12-02 RX ADMIN — ACETAMINOPHEN 975 MG: 325 TABLET, FILM COATED ORAL at 05:16

## 2024-12-02 RX ADMIN — BISACODYL 10 MG: 10 SUPPOSITORY RECTAL at 13:28

## 2024-12-02 NOTE — ASSESSMENT & PLAN NOTE
Had overactive bladder in the past and received Botox.   Continues to have urinary retention requiring ahn catheter placement. Recently failed outpatient voiding trial in the urology office   Continue ahn catheter

## 2024-12-02 NOTE — ASSESSMENT & PLAN NOTE
S/p recent fall at [facility]  CT left knee showing what appears to be a fracture of her bipartite patella   Seen by orthopedic team  Knee immobilizer x 2 weeks with WBAT to LLE, needs immobilizer through 12/2/24  Outpatient follow up with orthopedics, repeat xray knee at that time   Updated inpatient orthopedics

## 2024-12-02 NOTE — CASE MANAGEMENT
Case Management Discharge Planning Note    Patient name Mattie Varela  Location [facility] /[facility] -* MRN [id number]  : 1945 Date 2024       Current Admission Date: 2024  Current Admission Diagnosis:Acute on chronic diastolic congestive heart failure (HCC)   Patient Active Problem List    Diagnosis Date Noted Date Diagnosed    SADAF (acute kidney injury) ([facility]) 2024     Insomnia 2024     Patella fracture 2024     Pain in both lower extremities 10/30/2024     Chronic heart failure with preserved ejection fraction (HFpEF) ([facility]) 10/25/2024     Acute kidney injury superimposed on chronic kidney disease  ([facility]) 10/18/2024     Acute low back pain 10/13/2024     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     Generalized edema 2024     Constipation 2024     Leg pain, right 2024     Acute on chronic diastolic congestive heart failure ([facility]) 2024     s/p Medtronic loop recorder 3/2/2024 2024     Moderate protein-calorie malnutrition ([facility]) 2024     Hypertensive urgency 2024     AMS (altered mental status) 2024     Encounter for examination for admission to nursing home 2024     Left ventricular outflow obstruction 2024     Obesity, Class I, BMI 30-34.9 2024     Urinary retention 2023     Exertional shortness of breath 2023     Non obstructive CAD 11/15/2023     History of lumbar surgery 11/10/2023     Abnormal urinalysis 2023     Chronic obstructive pulmonary disease, unspecified COPD type ([facility]) 2023     Confusion with body shakes and blank stare 2023     Anemia in stage 3a chronic kidney disease  ([facility]) 2023     Lower extremity edema 2023     Cerebrovascular accident (CVA), unspecified mechanism ([facility]) 2022     Prepyloric ulcer 2021     Diarrhea 2021     Mild intermittent asthma without complication 2020     S/P insertion of spinal cord stimulator 2018      Iron deficiency anemia secondary to inadequate dietary iron intake 06/19/2018     Type 2 diabetes mellitus with other skin complications (HCC)      Failed back surgical syndrome 03/16/2018     Hypothyroidism 11/20/2017     Degenerative lumbar spinal stenosis 01/25/2017     Glaucoma 01/06/2017     Overactive bladder 08/04/2016     Dyspepsia 02/09/2016     Hypercholesterolemia 02/09/2016     Anxiety 02/09/2015     Chronic pain disorder 12/18/2013     Hypertension 06/12/2013       LOS (days): 15  Geometric Mean LOS (GMLOS) (days): 3.9  Days to GMLOS:-11.2     OBJECTIVE:  Risk of Unplanned Readmission Score: 45.29         Current admission status: Inpatient   Preferred Pharmacy:   [facility] [place] - [address]  [address]  [address] [place] [address]  Phone: [phone] Fax: [phone]    [facility] [place] [place] - [address]  [facility],  [address],  First Floor [facility] [address]  Phone: [phone] Fax: [phone]    [facility] - [place], [place] - [address]  [address]  [place] [address]  Phone: [phone] Fax: [phone]    Primary Care Provider: NANY Pollock    Primary Insurance: [employer] [facility]  Secondary Insurance:     DISCHARGE DETAILS:       Transport at Discharge : [facility]     Number/Name of Dispatcher: [phone]  Transported by (Company and Unit #): [facility]  ETA of Transport (Date): 12/02/24  ETA of Transport (Time): 1600       Additional Comments: Pt is medically cleared for discharge.  [facility] has accepted the pt.  CM notified pt's insurance [employer], s/w [place]etha.  Pt is approved to go to [facility].  Pt needing BLS transport. Arranged for 4PM. Medical Necessity completed an in pt binder.  Pt and her daughter updated.  Pt has no other needs at this time.    Accepting Facility Name, City & State :  [facility]  Receiving Facility/Agency Phone Number: [phone]  Facility/Agency Fax Number: [phone]

## 2024-12-02 NOTE — ASSESSMENT & PLAN NOTE
Had overactive bladder in the past and received Botox.   Continues to have urinary retention requiring ahn catheter placement. Recently failed outpatient voiding trial in the urology office   Continue ahn catheter, outpatient voiding trial

## 2024-12-02 NOTE — DISCHARGE INSTR - AVS FIRST PAGE
Dear Mattie Varela,     It was our pleasure to care for you here at [facility].  It is our hope that we were always able to exceed the expected standards for your care during your stay.  You were hospitalized due to heart failure, patellar fracture, ambulatory dysfunction .  You were cared for on the 4th floor under the service of Alexys Angel DO with the [facility] who covers for your primary care physician (PCP), NANY Pollock, while you were hospitalized.  If you have any questions or concerns related to this hospitalization, you may contact us at .  For follow up as well as medication refills, we recommend that you follow up with your primary care physician.  A registered nurse will reach out to you by phone within a few days after your discharge to answer any additional questions that you may have after going home.  However, at this time we provide for you here, the most important instructions / recommendations at discharge:     Notable Medication Adjustments -   Hold metformin until you are seen by your primary care doctor.  Your GFR is borderline for continued use.  During the hospitalization you noted little benefit to outpatient oxycodone and a prior provider transitioned you from as needed oxycodone to as needed Dilaudid  Reduce metoprolol succinate dose to 12.5 mg daily  Testing Required after Discharge -   Outpatient voiding trial with urology  ** Please contact your PCP to request testing orders for any of the testing recommended here **  Important Follow Up Information -   Follow-up with orthopedics to discuss removal of immobilizer  Please see your primary care doctor in 1 to 2 weeks  Please follow-up with cardiology in the next 1 to 2 months  Please review this entire after visit summary as additional general instructions including medication list, appointments, activity, diet, any pertinent wound care, and  other additional recommendations from your care team that may be provided for you.      Sincerely,     Alexys Angel, DO

## 2024-12-02 NOTE — ASSESSMENT & PLAN NOTE
Complained of chest pain with inspiration earlier in hospitalization, This has resolved   Obtained EKG- without signs of acute ischemia, Trop was 22  Chest x-ray: without pneumonia, effusion, pneumothorax  Patient with risk factors for PE of immobility, recent fracture -PE ruled out with CTA chest  Noted heavy atherosclerotic coronary artery calcification  Last cath was in 2023 with known disease that was treated with medical management  Recommended for ongoing medical management - titrate antianginals

## 2024-12-02 NOTE — ASSESSMENT & PLAN NOTE
Initially with filiberto creatinine peaked 3.4  Now plateaued around 1.5 which is near patient's baseline  Suspect patient will need elevated creatinine to maintain euvolemia

## 2024-12-02 NOTE — ASSESSMENT & PLAN NOTE
S/p recent fall at [facility]  CT left knee showing what appears to be a fracture of her bipartite patella   Seen by orthopedic team  Knee immobilizer x 2 weeks with WBAT to LLE   Two weeks is up today. Dicussed with on call orthopedics who recommends two view knee xray and they will evaluate

## 2024-12-02 NOTE — ASSESSMENT & PLAN NOTE
Patient with history of chronic pain status post spinal stimulator  Chronically on oxycodone as an outpatient  Prior provider transitioned to oral hydromorphone as she was appreciating minimal response/improvement as an outpatient with oxycodone

## 2024-12-02 NOTE — NURSING NOTE
Patient discharged to [facility] via transport team. IV removed. All belongings were taken. Report called to receiving facility. No further questions at this time.

## 2024-12-02 NOTE — PROGRESS NOTES
Progress Note - Hospitalist   Name: Mattie Varela 79 y.o. female I MRN: [id number]  Unit/Bed#: E4 -01 I Date of Admission: 11/17/2024   Date of Service: 12/2/2024 I Hospital Day: 15    Assessment & Plan  Chest pain (Resolved: 12/2/2024)  Complained of chest pain with inspiration earlier in hospitalization, This has resolved   Obtained EKG- without signs of acute ischemia, Trop was 22  Chest x-ray: without pneumonia, effusion, pneumothorax  Patient with risk factors for PE of immobility, recent fracture -PE ruled out with CTA chest  Noted heavy atherosclerotic coronary artery calcification  Last cath was in 2023 with known disease that was treated with medical management  Recommended for ongoing medical management - titrate antianginals   Patella fracture  S/p recent fall at [facility]  CT left knee showing what appears to be a fracture of her bipartite patella   Seen by orthopedic team  Knee immobilizer x 2 weeks with WBAT to LLE   Two weeks is up today. Dicussed with on call orthopedics who recommends two view knee xray and they will evaluate     Type 2 diabetes mellitus with other skin complications (HCC)  Continue sliding scale insulin  Urinary retention  Had overactive bladder in the past and received Botox.   Continues to have urinary retention requiring ahn catheter placement. Recently failed outpatient voiding trial in the urology office   Continue ahn catheter   Chronic pain disorder  Patient with history of chronic pain status post spinal stimulator  Chronically on oxycodone as an outpatient  Prior provider transitioned to oral hydromorphone as she was appreciating minimal response/improvement as an outpatient with oxycodone  Hypertension  Well controlled   Continue current regimen   Hypothyroidism  Continue levothyroxine  Insomnia  Ambien was held when patient was initiated on dilaudid   Degenerative lumbar spinal stenosis  Known history, chronic pain disorder  Outpatient follow-up with spinal  specialist  SADAF (acute kidney injury) (formerly [facility])  Initially with sadaf creatinine peaked 3.4  Acute on chronic diastolic congestive heart failure (formerly [facility])  Wt Readings from Last 3 Encounters:   12/02/24 81.3 kg (179 lb 3.7 oz)   10/18/24 74.3 kg (163 lb 12.8 oz)   10/02/24 85.5 kg (188 lb 9.6 oz)   79 year old female presented with shortness of breath, lower extremity edema, volume overload, and weight gain secondary to acute on chronic diastolic congestive heart failure.  Echo was updated-EF 51%, systolic function low normal, septic akinetic to paradoxical, endocardial detection was significantly limited.  Unable to assess diastolic function  Improvement in weight and clinical status   Continue torsemide 30 mg BID, metoprolol succinate   Jardiance is cost prohibitive   Constipation  Drug induced  Continue bowel regimen   Utilized PRN reslitor while inpatient     VTE Pharmacologic Prophylaxis: heparin     Patient Centered Rounds:  Patient care rounds were performed with nursing    Education and Discussions with Family / Patient: Updated daughter via telephone on plan of care during rounds    Time Spent for Care: I have spent a total time of 40 minutes on 12/02/24 in caring for this patient including Diagnostic results, Instructions for management, Impressions, Counseling / Coordination of care, Documenting in the medical record, Reviewing / ordering tests, medicine, procedures  , Obtaining or reviewing history  , and Communicating with other healthcare professionals .      Current Length of Stay: 15 day(s)    Current Patient Status: Inpatient   Certification Statement: The patient will continue to require additional inpatient hospital stay due to need for placement to rehab     Discharge Plan: Medically stable for discharge,  aware     Code Status: Level 1 - Full Code      Subjective:   Patient seen and evaluated at bedside. She is still having some knee pain which is limiting her mobility.     Objective:     Vitals:   Temp  (24hrs), Av.7 °F (36.5 °C), Min:97.2 °F (36.2 °C), Max:98.2 °F (36.8 °C)    Temp:  [97.2 °F (36.2 °C)-98.2 °F (36.8 °C)] 97.2 °F (36.2 °C)  HR:  [87-98] 91  Resp:  [18] 18  BP: (106-132)/(56-61) 132/61  SpO2:  [94 %-95 %] 94 %  Body mass index is 35 kg/m².     Input and Output Summary (last 24 hours):       Intake/Output Summary (Last 24 hours) at 2024 1409  Last data filed at 2024 0301  Gross per 24 hour   Intake 160 ml   Output 1450 ml   Net -1290 ml       Physical Exam:     Physical Exam  Vitals reviewed.   Constitutional:       General: She is not in acute distress.     Appearance: She is well-developed. She is not ill-appearing, toxic-appearing or diaphoretic.   HENT:      Head: Normocephalic and atraumatic.      Mouth/Throat:      Mouth: Mucous membranes are moist.   Eyes:      General: No scleral icterus.     Extraocular Movements: Extraocular movements intact.   Cardiovascular:      Rate and Rhythm: Normal rate and regular rhythm.      Heart sounds: Normal heart sounds.   Pulmonary:      Effort: Pulmonary effort is normal. No respiratory distress.      Breath sounds: Normal breath sounds. No wheezing or rales.   Abdominal:      General: There is no distension.      Palpations: Abdomen is soft.      Tenderness: There is no abdominal tenderness. There is no guarding or rebound.   Musculoskeletal:         General: No swelling, tenderness or deformity.      Comments: Left lower extremity in knee immobilizer   Skin:     General: Skin is warm and dry.   Neurological:      General: No focal deficit present.      Mental Status: She is alert. Mental status is at baseline.   Psychiatric:         Behavior: Behavior normal.         Thought Content: Thought content normal.         Judgment: Judgment normal.      Comments: Anxious         Additional Data:     Labs: I have reviewed pertinent results     Results from last 7 days   Lab Units 24  0433   WBC Thousand/uL 6.85   HEMOGLOBIN g/dL 10.3*    HEMATOCRIT % 32.9*   PLATELETS Thousands/uL 354   SEGS PCT % 42*   LYMPHO PCT % 36   MONO PCT % 11   EOS PCT % 9*     Results from last 7 days   Lab Units 12/02/24  0530   SODIUM mmol/L 138   POTASSIUM mmol/L 3.7   CHLORIDE mmol/L 96   CO2 mmol/L 36*   BUN mg/dL 32*   CREATININE mg/dL 1.54*   ANION GAP mmol/L 6   CALCIUM mg/dL 9.6   GLUCOSE RANDOM mg/dL 142*         Results from last 7 days   Lab Units 12/02/24  1242 12/02/24  0850 12/01/24  2153 12/01/24  1536 12/01/24  1133 12/01/24  0805 11/30/24  2124 11/30/24  1644 11/30/24  1128 11/30/24  0834 11/29/24  2033 11/29/24  1622   POC GLUCOSE mg/dl 135 156* 146* 195* 218* 161* 169* 148* 152* 136 164* 162*                 Imaging: I have reviewed pertinent imaging       Recent Cultures (last 7 days):           Last 24 Hours Medication List:   Current Facility-Administered Medications   Medication Dose Route Frequency Provider Last Rate    acetaminophen  975 mg Oral Q8H [facility] Susannah Mcclain PA-C      albuterol  2 puff Inhalation Q6H PRN Paco Carmona MD      aspirin  81 mg Oral Daily Paco Carmona MD      atorvastatin  40 mg Oral Daily With Dinner Paco Carmona MD      bisacodyl  10 mg Rectal Daily PRN Alexys Angel DO      diphenhydrAMINE  12.5 mg Oral Q6H PRN Joana Webb PA-C      gabapentin  300 mg Oral HS Khurram Ceron MD      guaiFENesin  600 mg Oral Q12H [facility] Bi Luke PA-C      heparin (porcine)  5,000 Units Subcutaneous Q8H [facility] Susannah Mcclain PA-C      HYDROmorphone  2 mg Oral 4x Daily Khurram Ceron MD      HYDROmorphone  0.2 mg Intravenous Q4H PRN Khurram Ceron MD      insulin lispro  1-6 Units Subcutaneous 4x Daily (AC & HS) NANY Miller      latanoprost  1 drop Both Eyes HS Paco Carmona MD      levalbuterol  1.25 mg Nebulization Q6H PRN Perlita Torres MD      levothyroxine  37.5 mcg Oral Early Morning Paco Carmona MD      lidocaine  1 patch Topical Daily Perlita Torres MD      melatonin  3 mg Oral [facility]  Mahendra,       methocarbamol  250 mg Oral Q8H PRN Manuelito Mccray DO      metoprolol succinate  12.5 mg Oral Daily Jackson Morris MD      pantoprazole  40 mg Oral Early Morning Paco Carmona MD      senna-docusate sodium  1 tablet Oral BID Manuelito Mccray,       sucralfate  1 g Oral BID AC Krystina Preston PA-C      torsemide  30 mg Oral BID Torey Farnsworth DO      white petrolatum-mineral oil   Topical TID PRN Joseph Carrasco PA-C          Today, Patient Was Seen By: Alexys Angel DO    ** Please Note: Dictation voice to text software may have been used in the creation of this document. **

## 2024-12-02 NOTE — PLAN OF CARE
Problem: Potential for Falls  Goal: Patient will remain free of falls  Description: INTERVENTIONS:  - Educate patient/family on patient safety including physical limitations  - Instruct patient to call for assistance with activity   - Consult OT/PT to assist with strengthening/mobility   - Keep Call bell within reach  - Keep bed low and locked with side rails adjusted as appropriate  - Keep care items and personal belongings within reach  - Initiate and maintain comfort rounds  - Make Fall Risk Sign visible to staff  - Offer Toileting every 2 Hours, in advance of need  - Initiate/Maintain bed alarm  - Obtain necessary fall risk management equipment: alarm   - Apply yellow socks and bracelet for high fall risk patients  - Consider moving patient to room near nurses station  Outcome: Progressing     Problem: PAIN - ADULT  Goal: Verbalizes/displays adequate comfort level or baseline comfort level  Description: Interventions:  - Encourage patient to monitor pain and request assistance  - Assess pain using appropriate pain scale  - Administer analgesics based on type and severity of pain and evaluate response  - Implement non-pharmacological measures as appropriate and evaluate response  - Consider cultural and social influences on pain and pain management  - Notify physician/advanced practitioner if interventions unsuccessful or patient reports new pain  Outcome: Progressing     Problem: SAFETY ADULT  Goal: Patient will remain free of falls  Description: INTERVENTIONS:  - Educate patient/family on patient safety including physical limitations  - Instruct patient to call for assistance with activity   - Consult OT/PT to assist with strengthening/mobility   - Keep Call bell within reach  - Keep bed low and locked with side rails adjusted as appropriate  - Keep care items and personal belongings within reach  - Initiate and maintain comfort rounds  - Make Fall Risk Sign visible to staff  - Offer Toileting every 2 Hours,  in advance of need  - Initiate/Maintain bed alarm  - Obtain necessary fall risk management equipment: alarm   - Apply yellow socks and bracelet for high fall risk patients  - Consider moving patient to room near nurses station  Outcome: Progressing  Goal: Maintain or return to baseline ADL function  Description: INTERVENTIONS:  -  Assess patient's ability to carry out ADLs; assess patient's baseline for ADL function and identify physical deficits which impact ability to perform ADLs (bathing, care of mouth/teeth, toileting, grooming, dressing, etc.)  - Assess/evaluate cause of self-care deficits   - Assess range of motion  - Assess patient's mobility; develop plan if impaired  - Assess patient's need for assistive devices and provide as appropriate  - Encourage maximum independence but intervene and supervise when necessary  - Involve family in performance of ADLs  - Assess for home care needs following discharge   - Consider OT consult to assist with ADL evaluation and planning for discharge  - Provide patient education as appropriate  Outcome: Progressing  Goal: Maintains/Returns to pre admission functional level  Description: INTERVENTIONS:  - Perform AM-PAC 6 Click Basic Mobility/ Daily Activity assessment daily.  - Set and communicate daily mobility goal to care team and patient/family/caregiver.   - Collaborate with rehabilitation services on mobility goals if consulted  - Record patient progress and toleration of activity level   Outcome: Progressing     Problem: DISCHARGE PLANNING  Goal: Discharge to home or other facility with appropriate resources  Description: INTERVENTIONS:  - Identify barriers to discharge w/patient and caregiver  - Arrange for needed discharge resources and transportation as appropriate  - Identify discharge learning needs (meds, wound care, etc.)  - Arrange for interpretive services to assist at discharge as needed  - Refer to Case Management Department for coordinating discharge  planning if the patient needs post-hospital services based on physician/advanced practitioner order or complex needs related to functional status, cognitive ability, or social support system  Outcome: Progressing     Problem: Knowledge Deficit  Goal: Patient/family/caregiver demonstrates understanding of disease process, treatment plan, medications, and discharge instructions  Description: Complete learning assessment and assess knowledge base.  Interventions:  - Provide teaching at level of understanding  - Provide teaching via preferred learning methods  Outcome: Progressing     Problem: CARDIOVASCULAR - ADULT  Goal: Maintains optimal cardiac output and hemodynamic stability  Description: INTERVENTIONS:  - Monitor I/O, vital signs and rhythm  - Monitor for S/S and trends of decreased cardiac output  - Administer and titrate ordered vasoactive medications to optimize hemodynamic stability  - Assess quality of pulses, skin color and temperature  - Assess for signs of decreased coronary artery perfusion  - Instruct patient to report change in severity of symptoms  Outcome: Progressing  Goal: Absence of cardiac dysrhythmias or at baseline rhythm  Description: INTERVENTIONS:  - Continuous cardiac monitoring, vital signs, obtain 12 lead EKG if ordered  - Administer antiarrhythmic and heart rate control medications as ordered  - Monitor electrolytes and administer replacement therapy as ordered  Outcome: Progressing     Problem: RESPIRATORY - ADULT  Goal: Achieves optimal ventilation and oxygenation  Description: INTERVENTIONS:  - Assess for changes in respiratory status  - Assess for changes in mentation and behavior  - Position to facilitate oxygenation and minimize respiratory effort  - Oxygen administered by appropriate delivery if ordered  - Initiate smoking cessation education as indicated  - Encourage broncho-pulmonary hygiene including cough, deep breathe, Incentive Spirometry  - Assess the need for suctioning  and aspirate as needed  - Assess and instruct to report SOB or any respiratory difficulty  - Respiratory Therapy support as indicated  Outcome: Progressing     Problem: Prexisting or High Potential for Compromised Skin Integrity  Goal: Skin integrity is maintained or improved  Description: INTERVENTIONS:  - Identify patients at risk for skin breakdown  - Assess and monitor skin integrity  - Assess and monitor nutrition and hydration status  - Monitor labs   - Assess for incontinence   - Turn and reposition patient  - Assist with mobility/ambulation  - Relieve pressure over bony prominences  - Avoid friction and shearing  - Provide appropriate hygiene as needed including keeping skin clean and dry  - Evaluate need for skin moisturizer/barrier cream  - Collaborate with interdisciplinary team   - Patient/family teaching  - Consider wound care consult   Outcome: Progressing

## 2024-12-02 NOTE — ASSESSMENT & PLAN NOTE
Drug induced  Continue bowel regimen MiraLAX, senna, Colace  Utilized PRN reslitor while inpatient

## 2024-12-02 NOTE — ASSESSMENT & PLAN NOTE
Wt Readings from Last 3 Encounters:   12/02/24 81.3 kg (179 lb 3.7 oz)   10/18/24 74.3 kg (163 lb 12.8 oz)   10/02/24 85.5 kg (188 lb 9.6 oz)   79 year old female presented with shortness of breath, lower extremity edema, volume overload, and weight gain secondary to acute on chronic diastolic congestive heart failure.  Echo was updated-EF 51%, systolic function low normal, septic akinetic to paradoxical, endocardial detection was significantly limited.  Unable to assess diastolic function  Improvement in weight and clinical status   Continue torsemide 30 mg BID, metoprolol succinate   Jardiance is cost prohibitive

## 2024-12-02 NOTE — ASSESSMENT & PLAN NOTE
Patient with history of chronic pain status post spinal stimulator  Chronically on oxycodone as an outpatient  Now presents with increasing pain requirements with patellar fracture limiting her mobility  Prior provider transitioned to oral hydromorphone as she was appreciating minimal response/improvement as an outpatient with oxycodone  Discharged with bridging prescription 2 mg Dilaudid every 6 hours as needed

## 2024-12-02 NOTE — DISCHARGE SUMMARY
Discharge Summary - Hospitalist   Name: Mattie Varela 79 y.o. female I MRN: [id number]  Unit/Bed#: E4 -01 I Date of Admission: 11/17/2024   Date of Service: 12/2/2024 I Hospital Day: 15     Assessment & Plan  Patella fracture  S/p recent fall at [facility]  CT left knee showing what appears to be a fracture of her bipartite patella   Seen by orthopedic team  Knee immobilizer x 2 weeks with WBAT to LLE, needs immobilizer through 12/2/24  Outpatient follow up with orthopedics, repeat xray knee at that time   Updated inpatient orthopedics     Type 2 diabetes mellitus with other skin complications ([facility])  Patient's GFR is borderline approximately 30  Hold metformin until patient follows up with PCP  Urinary retention  Had overactive bladder in the past and received Botox.   Continues to have urinary retention requiring ahn catheter placement. Recently failed outpatient voiding trial in the urology office   Continue ahn catheter, outpatient voiding trial  Chronic pain disorder  Patient with history of chronic pain status post spinal stimulator  Chronically on oxycodone as an outpatient  Now presents with increasing pain requirements with patellar fracture limiting her mobility  Prior provider transitioned to oral hydromorphone as she was appreciating minimal response/improvement as an outpatient with oxycodone  Discharged with bridging prescription 2 mg Dilaudid every 6 hours as needed  Hypertension  Well controlled   Continue current regimen   Hypothyroidism  Continue levothyroxine  Insomnia  Would avoid ambien, restoril with changes in pain medication at this time in elderly patient   Degenerative lumbar spinal stenosis  Known history, chronic pain disorder  Outpatient follow-up with spinal specialist  FILIBERTO (acute kidney injury) ([facility])  Initially with filiberto creatinine peaked 3.4  Now plateaued around 1.5 which is near patient's baseline  Suspect patient will need elevated creatinine to maintain euvolemia  Acute on  chronic diastolic congestive heart failure (HCC)  Wt Readings from Last 3 Encounters:   12/02/24 81.3 kg (179 lb 3.7 oz)   10/18/24 74.3 kg (163 lb 12.8 oz)   10/02/24 85.5 kg (188 lb 9.6 oz)   79 year old female presented with shortness of breath, lower extremity edema, volume overload, and weight gain secondary to acute on chronic diastolic congestive heart failure.  Echo was updated-EF 51%, systolic function low normal, septic akinetic to paradoxical, endocardial detection was significantly limited.  Unable to assess diastolic function  Improvement in weight and clinical status   Continue torsemide 30 mg BID, metoprolol succinate   Jardiance is cost prohibitive   Constipation  Drug induced  Continue bowel regimen MiraLAX, senna, Colace  Utilized PRN reslitor while inpatient      Discharging Physician / Practitioner: Alexys Angel DO  PCP: NANY Pollock  Admission Date:   Admission Orders (From admission, onward)       Ordered        11/17/24 1157  INPATIENT ADMISSION  Once                          Discharge Date: 12/02/24    Medical Problems       Resolved Problems  Date Reviewed: 11/29/2024          Resolved    Acute kidney injury superimposed on stage 3a chronic kidney disease (HCC) 11/26/2024     Resolved by  Kylie Sommers PA-C    Chest pain 12/2/2024     Resolved by  Alexys Angel DO    Colitis 11/26/2024     Resolved by  Kylie Sommers PA-C    Hypokalemia 11/26/2024     Resolved by  Kylie Sommers PA-C          Consultations During Hospital Stay:   IP CONSULT TO CARDIOLOGY  IP CONSULT TO ORTHOPEDIC SURGERY  IP CONSULT TO NEPHROLOGY  IP CONSULT TO ORTHOPEDIC SURGERY  IP CONSULT TO ORTHOPEDIC SURGERY    Procedures Performed: None    Significant Findings / Test Results:    CT pe study w abdomen pelvis w contrast  Result Date: 11/21/2024  Impression: 1.  No pulmonary embolus. 2.  Findings compatible colitis, most prominently involving the descending and sigmoid colon. Workstation performed: [id number]      XR chest portable  Result Date: 11/21/2024  Impression: No focal consolidation, pleural effusion, or pneumothorax. Workstation performed: [id number]     CT lower extremity wo contrast left  Result Date: 11/18/2024  Impression: Acute nondisplaced fracture of the bipartite patella. This report is concordant with JASVIR TOSCANO's preliminary interpretation that is documented in the electronic medical record (EPIC). Resident: JÚNIOR Rockwell I, the attending radiologist, have reviewed the images and agree with the final report above. Workstation performed: [id number]     XR chest 1 view portable  Result Date: 11/18/2024  Impression: No acute cardiopulmonary disease. Workstation performed: [id number]       Incidental Findings: Above    Test Results Pending at Discharge (will require follow up): None     Outpatient Tests Requested: None    Reason for Admission: Shortness of breath    Hospital Course:     Mattie Varela is a 79 y.o. female With past medical history of chronic diastolic CHF, CKD 3, non-insulin-dependent type 2 diabetes, urinary retention with Bateman catheter in place, hypothyroidism who presents to the hospital with bilateral lower extremity edema, weight gain, shortness of breath.  Patient was clinically volume overloaded and was initiated on IV diuretics.  Patient underwent comanagement of diuretics by cardiology and nephrology.  She was ultimately transition to oral torsemide with weights and creatinine remaining stable.  Patient was seen by orthopedics due to left knee lateral facet patella fracture.  Recommendation for immobilizer for 2 weeks and then outpatient follow-up to discuss removal.  Patient was seen by PT/OT with recommendations for acute rehab    Please see above list of diagnoses and related plan for additional information.     Condition at Discharge: stable     Discharge Day Visit / Exam:     * Please refer to separate progress note for these details *    Discharge  instructions/Information to patient and family:   See after visit summary for information provided to patient and family.      Provisions for Follow-Up Care:  See after visit summary for information related to follow-up care and any pertinent home health orders.      Disposition:   To STR     Discharge Statement:  I spent 30 minutes discharging the patient. This time was spent on the day of discharge. I had direct contact with the patient on the day of discharge. Greater than 50% of the total time was spent examining patient, answering all patient questions, arranging and discussing plan of care with patient as well as directly providing post-discharge instructions.  Additional time then spent on discharge activities.    Discharge Medications:  See after visit summary for reconciled discharge medications provided to patient and family.

## 2024-12-02 NOTE — PLAN OF CARE
Problem: Potential for Falls  Goal: Patient will remain free of falls  Description: INTERVENTIONS:  - Educate patient/family on patient safety including physical limitations  - Instruct patient to call for assistance with activity   - Consult OT/PT to assist with strengthening/mobility   - Keep Call bell within reach  - Keep bed low and locked with side rails adjusted as appropriate  - Keep care items and personal belongings within reach  - Initiate and maintain comfort rounds  - Make Fall Risk Sign visible to staff  - Offer Toileting every 2 Hours, in advance of need  - Initiate/Maintain bed alarm  - Obtain necessary fall risk management equipment  - Apply yellow socks and bracelet for high fall risk patients  - Consider moving patient to room near nurses station  Outcome: Progressing     Problem: PAIN - ADULT  Goal: Verbalizes/displays adequate comfort level or baseline comfort level  Description: Interventions:  - Encourage patient to monitor pain and request assistance  - Assess pain using appropriate pain scale  - Administer analgesics based on type and severity of pain and evaluate response  - Implement non-pharmacological measures as appropriate and evaluate response  - Consider cultural and social influences on pain and pain management  - Notify physician/advanced practitioner if interventions unsuccessful or patient reports new pain  Outcome: Progressing     Problem: SAFETY ADULT  Goal: Patient will remain free of falls  Description: INTERVENTIONS:  - Educate patient/family on patient safety including physical limitations  - Instruct patient to call for assistance with activity   - Consult OT/PT to assist with strengthening/mobility   - Keep Call bell within reach  - Keep bed low and locked with side rails adjusted as appropriate  - Keep care items and personal belongings within reach  - Initiate and maintain comfort rounds  - Make Fall Risk Sign visible to staff  - Offer Toileting every 2 Hours, in  advance of need  - Initiate/Maintain bed alarm  - Obtain necessary fall risk management equipment  - Apply yellow socks and bracelet for high fall risk patients  - Consider moving patient to room near nurses station  Outcome: Progressing  Goal: Maintain or return to baseline ADL function  Description: INTERVENTIONS:  -  Assess patient's ability to carry out ADLs; assess patient's baseline for ADL function and identify physical deficits which impact ability to perform ADLs (bathing, care of mouth/teeth, toileting, grooming, dressing, etc.)  - Assess/evaluate cause of self-care deficits   - Assess range of motion  - Assess patient's mobility; develop plan if impaired  - Assess patient's need for assistive devices and provide as appropriate  - Encourage maximum independence but intervene and supervise when necessary  - Involve family in performance of ADLs  - Assess for home care needs following discharge   - Consider OT consult to assist with ADL evaluation and planning for discharge  - Provide patient education as appropriate  Outcome: Progressing  Goal: Maintains/Returns to pre admission functional level  Description: INTERVENTIONS:  - Perform AM-PAC 6 Click Basic Mobility/ Daily Activity assessment daily.  - Set and communicate daily mobility goal to care team and patient/family/caregiver.   - Collaborate with rehabilitation services on mobility goals if consulted  - Perform Range of Motion 3 times a day.  - Reposition patient every 2 hours.  - Dangle patient 3 times a day  - Record patient progress and toleration of activity level   Outcome: Progressing     Problem: DISCHARGE PLANNING  Goal: Discharge to home or other facility with appropriate resources  Description: INTERVENTIONS:  - Identify barriers to discharge w/patient and caregiver  - Arrange for needed discharge resources and transportation as appropriate  - Identify discharge learning needs (meds, wound care, etc.)  - Arrange for interpretive services to  assist at discharge as needed  - Refer to Case Management Department for coordinating discharge planning if the patient needs post-hospital services based on physician/advanced practitioner order or complex needs related to functional status, cognitive ability, or social support system  Outcome: Progressing     Problem: Knowledge Deficit  Goal: Patient/family/caregiver demonstrates understanding of disease process, treatment plan, medications, and discharge instructions  Description: Complete learning assessment and assess knowledge base.  Interventions:  - Provide teaching at level of understanding  - Provide teaching via preferred learning methods  Outcome: Progressing     Problem: CARDIOVASCULAR - ADULT  Goal: Maintains optimal cardiac output and hemodynamic stability  Description: INTERVENTIONS:  - Monitor I/O, vital signs and rhythm  - Monitor for S/S and trends of decreased cardiac output  - Administer and titrate ordered vasoactive medications to optimize hemodynamic stability  - Assess quality of pulses, skin color and temperature  - Assess for signs of decreased coronary artery perfusion  - Instruct patient to report change in severity of symptoms  Outcome: Progressing  Goal: Absence of cardiac dysrhythmias or at baseline rhythm  Description: INTERVENTIONS:  - Continuous cardiac monitoring, vital signs, obtain 12 lead EKG if ordered  - Administer antiarrhythmic and heart rate control medications as ordered  - Monitor electrolytes and administer replacement therapy as ordered  Outcome: Progressing     Problem: RESPIRATORY - ADULT  Goal: Achieves optimal ventilation and oxygenation  Description: INTERVENTIONS:  - Assess for changes in respiratory status  - Assess for changes in mentation and behavior  - Position to facilitate oxygenation and minimize respiratory effort  - Oxygen administered by appropriate delivery if ordered  - Initiate smoking cessation education as indicated  - Encourage broncho-pulmonary  hygiene including cough, deep breathe, Incentive Spirometry  - Assess the need for suctioning and aspirate as needed  - Assess and instruct to report SOB or any respiratory difficulty  - Respiratory Therapy support as indicated  Outcome: Progressing     Problem: Prexisting or High Potential for Compromised Skin Integrity  Goal: Skin integrity is maintained or improved  Description: INTERVENTIONS:  - Identify patients at risk for skin breakdown  - Assess and monitor skin integrity  - Assess and monitor nutrition and hydration status  - Monitor labs   - Assess for incontinence   - Turn and reposition patient  - Assist with mobility/ambulation  - Relieve pressure over bony prominences  - Avoid friction and shearing  - Provide appropriate hygiene as needed including keeping skin clean and dry  - Evaluate need for skin moisturizer/barrier cream  - Collaborate with interdisciplinary team   - Patient/family teaching  - Consider wound care consult   Outcome: Progressing

## 2024-12-03 ENCOUNTER — TELEPHONE (OUTPATIENT)
Dept: CARDIOLOGY CLINIC | Facility: CLINIC | Age: 79
End: 2024-12-03

## 2024-12-03 ENCOUNTER — PATIENT OUTREACH (OUTPATIENT)
Dept: CASE MANAGEMENT | Facility: OTHER | Age: 79
End: 2024-12-03

## 2024-12-03 ENCOUNTER — TRANSITIONAL CARE MANAGEMENT (OUTPATIENT)
Dept: FAMILY MEDICINE CLINIC | Facility: CLINIC | Age: 79
End: 2024-12-03

## 2024-12-03 NOTE — UTILIZATION REVIEW
NOTIFICATION OF ADMISSION DISCHARGE   This is a Notification of Discharge from [facility]. Please be advised that this patient has been discharge from our facility. Below you will find the admission and discharge date and time including the patient’s disposition.   UTILIZATION REVIEW CONTACT:  Denisa Nava  Utilization   Network Utilization Review Department  Phone: [phone] x carefully listen to the prompts. All voicemails are confidential.  Email: [email or web address]     ADMISSION INFORMATION  PRESENTATION DATE: 11/17/2024  8:59 AM  OBERVATION ADMISSION DATE: N/A  INPATIENT ADMISSION DATE: 11/17/24 11:58 AM   DISCHARGE DATE: 12/2/2024  4:35 PM   DISPOSITION:Non [facility] SNF/TCU/SNU    Network Utilization Review Department  ATTENTION: Please call with any questions or concerns to [phone] and carefully listen to the prompts so that you are directed to the right person. All voicemails are confidential.   For Discharge needs, contact Care Management DC Support Team at [phone] opt. 2  Send all requests for admission clinical reviews, approved or denied determinations and any other requests to dedicated fax number below belonging to the campus where the patient is receiving treatment. List of dedicated fax numbers for the Facilities:  FACILITY NAME UR FAX NUMBER   ADMISSION DENIALS (Administrative/Medical Necessity) [phone]   DISCHARGE SUPPORT TEAM ([facility]) [phone]   PARENT CHILD HEALTH (Maternity/NICU/Pediatrics) [phone]   [facility] [phone]   [facility] [phone]   [facility] [phone]   [facility] [phone]   [facility] [phone]   [facility] [phone]   [facility] [phone]   [facility]  [phone]   [facility] [phone]   [facility] [phone]   [facility] [phone]   [facility] [phone]

## 2024-12-03 NOTE — TELEPHONE ENCOUNTER
Spoke with hayley her daughter, she is in rehab they are keeping an eye on her. Will call back when out of rehab

## 2024-12-03 NOTE — PROGRESS NOTES
Outpatient Care management referral via HF  Discharge report 12/3/24. Discharged from [facility] on 12/2/24 to [facility] . Email sent to facility to inform them I will be following the patient during their skilled stay.  This Admin Coordinator will continue to monitor via chart review.

## 2024-12-12 ENCOUNTER — PATIENT OUTREACH (OUTPATIENT)
Dept: CASE MANAGEMENT | Facility: OTHER | Age: 79
End: 2024-12-12

## 2024-12-13 ENCOUNTER — PATIENT OUTREACH (OUTPATIENT)
Dept: CASE MANAGEMENT | Facility: OTHER | Age: 79
End: 2024-12-13

## 2024-12-13 NOTE — PROGRESS NOTES
Update received from [facility] the patient continues with therapy possible LCD of 12/30/24. Thank you email sent in return with contact information. This Admin Coordinator will continue to monitor via chart review.

## 2024-12-20 ENCOUNTER — OFFICE VISIT (OUTPATIENT)
Age: 79
End: 2024-12-20
Payer: MEDICARE

## 2024-12-20 ENCOUNTER — PATIENT OUTREACH (OUTPATIENT)
Dept: CASE MANAGEMENT | Facility: OTHER | Age: 79
End: 2024-12-20

## 2024-12-20 ENCOUNTER — APPOINTMENT (OUTPATIENT)
Age: 79
End: 2024-12-20
Payer: MEDICARE

## 2024-12-20 VITALS — BODY MASS INDEX: 35.14 KG/M2 | HEIGHT: 60 IN | WEIGHT: 179 LBS

## 2024-12-20 DIAGNOSIS — G89.29 CHRONIC PAIN OF LEFT KNEE: ICD-10-CM

## 2024-12-20 DIAGNOSIS — S82.001A CLOSED NONDISPLACED FRACTURE OF RIGHT PATELLA, UNSPECIFIED FRACTURE MORPHOLOGY, INITIAL ENCOUNTER: ICD-10-CM

## 2024-12-20 DIAGNOSIS — S82.002A CLOSED NONDISPLACED FRACTURE OF LEFT PATELLA, UNSPECIFIED FRACTURE MORPHOLOGY, INITIAL ENCOUNTER: Primary | ICD-10-CM

## 2024-12-20 DIAGNOSIS — M25.562 CHRONIC PAIN OF LEFT KNEE: ICD-10-CM

## 2024-12-20 PROCEDURE — 73562 X-RAY EXAM OF KNEE 3: CPT

## 2024-12-20 PROCEDURE — 99204 OFFICE O/P NEW MOD 45 MIN: CPT | Performed by: ORTHOPAEDIC SURGERY

## 2024-12-20 NOTE — PROGRESS NOTES
REASON FOR VISIT:  Chief Complaint   Patient presents with    Left Knee - Fracture, Pain     Pain 8/10 when felt. No current pain.        HISTORY OF PRESENT ILLNESS:  LEFT nondisplaced patella fracture    Patient reports that she fell on her left side on 11/14/24 while walking at Rehab  Was evaluated in the ED on 11/15/24, xrays were completed and revealed L patella fracture   Pain is much better now than the initial injury, she is not experiencing much pain at this time  Was placed in a brace in the ED for comfort, no longer wearing  Ambulates with a walker at baseline  Has been using a wheelchair occasionally and rehabing the knee with a walker at her living facility  Feels that she is improving overall   Has been ambulating now with a walker and pain improving      Past Medical History:   Diagnosis Date    Acid reflux     Acute kidney injury (HCC) 06/18/2022    Anemia     hx of iron-deficient    Anxiety     Arthritis     Asthma     last needed inhaler last year 2020    Basal cell carcinoma     upper lip    Chronic narcotic dependence ([facility])     Chronic pain     Colitis 11/19/2024    Colon polyp     Cystocele     Diabetes mellitus ([facility])     stable    Disease of thyroid gland     hypothyroidism    Diverticulosis     Dizziness     at times    Dysfunctional uterine bleeding     last assessed - [id number]    Dysphagia     Fibromyalgia     Gastric ulcer     Gastroparesis     History of colonic polyps     last assessed - [id number]    History of gastroesophageal reflux (GERD)     Hypercholesterolemia     Hyperlipidemia     Hypertension     Hyponatremia 01/13/2024    IBS (irritable bowel syndrome)     Pneumobilia 06/18/2022    Post laminectomy syndrome     S/P insertion of spinal cord stimulator 07/18/2018    s/p [employer] loop recorder 3/2/2024 03/02/2024    Seasonal allergies     Shortness of breath     exertional    Spinal stenosis     Status post lumbar spinal fusion 03/16/2018    Stroke ([facility])     pt states slight  stroke March 2022        Past Surgical History:   Procedure Laterality Date    APPENDECTOMY      BACK SURGERY      BREAST CYST EXCISION Left     CARDIAC CATHETERIZATION  11/14/2023    Procedure: Cardiac catheterization;  Surgeon: Randolph Holt MD;  [facility];  Service: Cardiology    CARDIAC CATHETERIZATION N/A 11/14/2023    Procedure: Cardiac Coronary Angiogram;  Surgeon: Randolph Holt MD;  [facility];  Service: Cardiology    CARDIAC ELECTROPHYSIOLOGY PROCEDURE N/A 3/2/2024    Procedure: Cardiac loop recorder implant;  Surgeon: Edu Fontaine MD;  [facility]: [facility]ARD[facility];  Service: Cardiology    CHOLECYSTECTOMY      COLONOSCOPY      ESOPHAGOGASTRODUODENOSCOPY N/A 09/28/2016    Procedure: ESOPHAGOGASTRODUODENOSCOPY (EGD);  Surgeon: Mylene Moeller MD;  Location: [facility] GI LAB;  Service:     HERNIA REPAIR      HYSTERECTOMY      TTAH-BSO age 28    LAMINECTOMY      LUMBAR LAMINECTOMY      OOPHORECTOMY      age 28    SD ARTHRODESIS POSTERIOR/PSTLAT TQ 1NTRSPC THORACIC N/A 06/04/2018    Procedure: Reopening of lumbar incision for T12-L5 posterior instrumented fixation and fusion and T12-L4 posterior decompression;  Surgeon: Wood Rogel MD;  Location: [facility] MAIN [facility];  Service: Neurosurgery    SD COLONOSCOPY FLX DX W/COLLJ SPEC WHEN PFRMD N/A 03/02/2016    Procedure: EGD AND COLONOSCOPY;  Surgeon: Mylene Moeller MD;  Location: [facility] GI LAB;  Service: Gastroenterology    SD DILATION ESOPH UNGUIDED SOUND/BOUGIE 1/MULT PASS N/A 09/28/2016    Procedure: DILATATION ESOPHAGEAL;  Surgeon: Mylene Moeller MD;  Location: [facility] GI [facility];  Service: Gastroenterology    SD ESOPHAGOGASTRODUODENOSCOPY TRANSORAL DIAGNOSTIC N/A 07/18/2016    Procedure: ESOPHAGOGASTRODUODENOSCOPY (EGD);  Surgeon: Mylene Moeller MD;  Location: [facility];  Service: Gastroenterology    SD INSJ/RPLCMT SPINAL NPG/RCVR POCKET CRTJ&CONNJ Left 06/04/2018    Procedure: removal of left buttock implantable pulse generator and  placement of new  implantable pulse generator;  Surgeon: Wood Rogel MD;  Location: [facility];  Service: Neurosurgery       Social History     Socioeconomic History    Marital status:      Spouse name: Not on file    Number of children: Not on file    Years of education: Not on file    Highest education level: Not on file   Occupational History    Occupation: Retired   Tobacco Use    Smoking status: Former     Current packs/day: 0.00     Types: Cigarettes     Quit date: 1970     Years since quittin.0    Smokeless tobacco: Never    Tobacco comments:     Denied history of current ever day smoker, Former smoker and Never smoker all documented in [employer]   Vaping Use    Vaping status: Never Used   Substance and Sexual Activity    Alcohol use: Not Currently     Comment: Denied history of alcohol use    Drug use: No     Comment: Denied history of drug use    Sexual activity: Not Currently   Other Topics Concern    Not on file   Social History Narrative    Marital History - Currently  per [employer]     Social Drivers of Health     Financial Resource Strain: Medium Risk (2022)    Overall Financial Resource Strain (CARDIA)     Difficulty of Paying Living Expenses: Somewhat hard   Food Insecurity: No Food Insecurity (10/15/2024)    Nursing - Inadequate Food Risk Classification     Worried About Running Out of Food in the Last Year: Never true     Ran Out of Food in the Last Year: Never true     Ran Out of Food in the Last Year: Not on file   Transportation Needs: No Transportation Needs (10/15/2024)    PRAPARE - Transportation     Lack of Transportation (Medical): No     Lack of Transportation (Non-Medical): No   Physical Activity: Not on file   Stress: Not on file   Social Connections: Not on file   Intimate Partner Violence: Not on file   Housing Stability: Low Risk  (10/15/2024)    Housing Stability Vital Sign     Unable to Pay for Housing in the Last Year: No     Number of Times Moved in  the Last Year: 0     Homeless in the Last Year: No       MEDICATIONS:    Current Outpatient Medications:     acetaminophen (TYLENOL) 325 mg tablet, Take 3 tablets (975 mg total) by mouth every 8 (eight) hours, Disp: , Rfl:     albuterol (PROVENTIL HFA,VENTOLIN HFA) 90 mcg/act inhaler, Inhale 2 puffs every 6 (six) hours as needed for wheezing or shortness of breath, Disp: 6.7 g, Rfl: 5    aspirin 81 mg chewable tablet, Chew 81 mg daily, Disp: , Rfl:     atorvastatin (LIPITOR) 40 mg tablet, TAKE ONE TABLET BY MOUTH ONCE DAILY, Disp: 90 tablet, Rfl: 0    baclofen 10 mg tablet, Take 1 tablet (10 mg total) by mouth 2 (two) times a day as needed for muscle spasms, Disp: , Rfl:     Blood Glucose Monitoring Suppl (OneTouch Verio Reflect) w/Device KIT, Check blood sugars twice daily. Please substitute with appropriate alternative as covered by patient's insurance. Dx: E11.65, Disp: 1 kit, Rfl: 0    docusate sodium (COLACE) 100 mg capsule, Take 1 capsule (100 mg total) by mouth 2 (two) times a day, Disp: , Rfl:     ferrous sulfate 325 (65 Fe) mg tablet, Take 1 tablet (325 mg total) by mouth daily with breakfast, Disp: 90 tablet, Rfl: 0    gabapentin (NEURONTIN) 100 mg capsule, Take 1 capsule (100 mg total) by mouth daily at bedtime, Disp: , Rfl:     glucose blood (OneTouch Verio) test strip, Check blood sugars twice daily. Please substitute with appropriate alternative as covered by patient's insurance. Dx: E11.65, Disp: 200 each, Rfl: 3    HYDROmorphone (DILAUDID) 2 mg tablet, Take 1 tablet (2 mg total) by mouth every 6 (six) hours as needed for moderate pain or severe pain Max Daily Amount: 8 mg, Disp: 12 tablet, Rfl: 0    latanoprost (XALATAN) 0.005 % ophthalmic solution, Administer 1 drop to both eyes daily at bedtime, Disp: , Rfl:     lidocaine (LIDODERM) 5 %, Apply 1 patch topically over 12 hours daily Remove & Discard patch within 12 hours or as directed by MD, Disp: , Rfl:     MAGNESIUM OXIDE 400 PO, Take 400 mg by  mouth 2 (two) times a day, Disp: , Rfl:     metoprolol succinate (TOPROL-XL) 25 mg 24 hr tablet, Take 0.5 tablets (12.5 mg total) by mouth daily, Disp: , Rfl:     Milnacipran HCl (Savella) 100 MG TABS, Take 1 tablet (100 mg total) by mouth daily, Disp: 30 tablet, Rfl: 3    OneTouch Delica Lancets 33G MISC, Check blood sugars twice daily. Please substitute with appropriate alternative as covered by patient's insurance. Dx: E11.65, Disp: 200 each, Rfl: 3    polyethylene glycol (MIRALAX) 17 g packet, Take 17 g by mouth daily, Disp: , Rfl:     senna (SENOKOT) 8.6 mg, Take 2 tablets (17.2 mg total) by mouth daily at bedtime (Patient taking differently: Take 17.2 mg by mouth daily as needed for constipation), Disp: , Rfl:     torsemide (DEMADEX) 10 mg tablet, Take 3 tablets (30 mg total) by mouth 2 (two) times a day, Disp: , Rfl:     levothyroxine 75 mcg tablet, Take 0.5 tablets (37.5 mcg total) by mouth daily in the early morning, Disp: , Rfl:     meclizine (ANTIVERT) 25 mg tablet, Take 1 tablet (25 mg total) by mouth 4 (four) times a day as needed for dizziness (Patient taking differently: Take 12.5 mg by mouth daily as needed for dizziness), Disp: 60 tablet, Rfl: 0    pantoprazole (PROTONIX) 40 mg tablet, Take 1 tablet (40 mg total) by mouth daily, Disp: , Rfl:     sucralfate (CARAFATE) 1 g tablet, Take 1 tablet (1 g total) by mouth 2 (two) times a day before meals for 7 days, Disp: 14 tablet, Rfl: 0    ALLERGIES:  Allergies   Allergen Reactions    Penicillins Anaphylaxis, Hives and Other (See Comments)     Other reaction(s): Unknown Reaction    Sulfa Antibiotics Anaphylaxis and Other (See Comments)     Other reaction(s): Unknown Reaction    Aspartame - Food Allergy Other (See Comments) and Hypertension     Slurred speech, weakness, stroke sx    Iodinated Contrast Media Hives     Pt has taken prep prior for contrast and has not had any break through reaction    Keflex [Cephalexin] Hives       MAJOR FINDINGS:  Mattie A  Nichole is pleasant, healthy appearing, and in no acute distress.     LEFT knee  Skin intact, ROM 0-0-120   No effusion  Normal patella tracking, slight tenderness on lateral portion of patella  No patella apprehension  Joint line tenderness: mild medial and lateral, Mayco test: deferred  Anterior drawer: negative, Lachman: stable, Posterior drawer: negative   Stable to varus/valgus  Sensation intact sp/dp/t  Strength intact 5/5 dorsiflexion/plantarflexion/SLR Extremity wwp  No calf pain      RIGHT knee  Skin intact, ROM 0-0-100  No effusion  Normal patella tracking   No patella apprehension  Joint line tenderness: none, Mayco test: deferred  Anterior drawer: negative, Lachman: stable, Posterior drawer: negative   Stable to varus/valgus  Sensation intact sp/dp/t  Strength intact 5/5 dorsiflexion/plantarflexion/SLR Extremity wwp  No calf pain    IMAGING  I personally reviewed and interpreted the imaging studies  X-rays performed on the LEFT knee show nondisplaced patella fracture with associated b/l moderate osteoarthritis       ASSESSMENT:  LEFT knee nondisplaced patellar fracture     PLAN:  Discussed that the patient that findings are consistent with LEFT nondisplaced patella fracture, improving. With associated mild osteoarthritis b/l   Discussed treatment options.  Imaging was reviewed today in the office and explained to the patient.  Continue Physical Therapy at living facility, to work on ROM and strength.   Can complete at home exercises as well for continued management.  Continue OTC pain meds and ice as needed, can apply Voltaren gel to the area PRN  Injections - can consider in the future to help manage pain associated with osteoarthritis    Follow up in 2 months or prn for further management of symptoms  Call the office with any questions or concerns.      CC:  NANY Pollock Self, Referral

## 2024-12-24 ENCOUNTER — PATIENT OUTREACH (OUTPATIENT)
Dept: CASE MANAGEMENT | Facility: OTHER | Age: 79
End: 2024-12-24

## 2025-01-02 ENCOUNTER — PATIENT OUTREACH (OUTPATIENT)
Dept: FAMILY MEDICINE CLINIC | Facility: CLINIC | Age: 80
End: 2025-01-02

## 2025-01-02 ENCOUNTER — PATIENT OUTREACH (OUTPATIENT)
Dept: CASE MANAGEMENT | Facility: OTHER | Age: 80
End: 2025-01-02

## 2025-01-02 DIAGNOSIS — Z71.89 COMPLEX CARE COORDINATION: Primary | ICD-10-CM

## 2025-01-02 NOTE — PROGRESS NOTES
Update obtained from [facility] the patient discharged 12/31/24 to Home. I have removed myself off of the care team and sent a inbasket to the appropriate care  to notifying them of the SNF discharge and HRR Referal.  Ambulatory referral placed for complex care management.

## 2025-01-02 NOTE — PROGRESS NOTES
Received SNF referral as patient was discharged from rehab to home.  As patient is active with senior life, she does not qualify for complex care management services.  Will close referral.

## 2025-01-07 ENCOUNTER — TELEPHONE (OUTPATIENT)
Dept: CARDIOLOGY CLINIC | Facility: CLINIC | Age: 80
End: 2025-01-07

## 2025-01-15 ENCOUNTER — TELEPHONE (OUTPATIENT)
Age: 80
End: 2025-01-15

## 2025-01-15 NOTE — TELEPHONE ENCOUNTER
Dr. Clark from [employer] calling regarding patient's UTI symptoms. She is inquiring any advise from urologist regarding empirical antibiotics for patient as she has strong history of MDRO Klebsiella pneumoniae.    Current symptoms of pelvic pressure, malodorous urine, chills, slight tachycardia, temperature 99.6 degrees Farenheit.      Previous culture from October below, please advise on empirical antibiotics, culture being collected and sent today, gave our fax# to [place] office to fax results to if not taken to [facility]'s lab.     Urine culture  Status: Final result      Contains abnormal data Urine culture  Order: [id number]   Status: Final result       Next appt: 02/12/2025 at 01:30 PM in Urology (UROLOGY NURSE [facility]ST[facility])    Test Result Released: Yes (not seen)    Specimen Information: Urine, Clean Catch   0 Result Notes  Urine Culture >100,000 cfu/ml Klebsiella pneumoniae ESBL Abnormal    An Extended-Spectrum Beta-Lactamase is being produced by this organism (requires contact precautions).  Cephalosporins are NOT effective for treating these organisms.  For SEVERE infections (i.e. bacteremia, septic shock, etc.), Carbapenems are the drug of choice.  For MILD infections (i.e. isolated urinary or biliary infection), high-dose Zosyn may be used.  This organism has been edited. The previous result was Gram Negative Igor Enteric Like on 10/26/2024 at 1759 EDT.  If amikacin is required for treatment, contact Microbiology for susceptibilities.   10,000-19,000 cfu/ml   Mixed Contaminants X3        Susceptibility     Klebsiella pneumoniae ESBL     MAYA     Amoxicillin + Clavulanate Intermediate     Ampicillin ($$) Resistant     Ampicillin + Sulbactam ($) Resistant     Aztreonam ($$$) Resistant     Cefazolin ($) Resistant     Cefepime ($) Resistant     Ceftazidime ($$) Resistant     Ceftriaxone ($$) Resistant     Cefuroxime ($$) Resistant     Ciprofloxacin ($) Resistant     Ertapenem ($$$) Susceptible     Gentamicin  ($$) Resistant     Levofloxacin ($) Resistant     Minocycline Susceptible     Nitrofurantoin Intermediate     Piperacillin + Tazobactam ($$$) Susceptible     Tetracycline Resistant     Trimethoprim + Sulfamethoxazole ($$$) Resistant                 Specimen Collected: 10/25/24 20:42 Last Resulted: 10/27/24 15:25     Dr. Clark's phone number: [phone]  If no answer please call [phone]

## 2025-01-17 NOTE — TELEPHONE ENCOUNTER
Called and spoke to Dr. Clark. Advised of provider's note attached below.      The  Thanked the office for the call and the provider's recommendation.

## 2025-01-19 ENCOUNTER — HOSPITAL ENCOUNTER (INPATIENT)
Facility: HOSPITAL | Age: 80
LOS: 3 days | Discharge: NURSING, REHAB OR LONG-TERM CARE | DRG: 291 | End: 2025-01-23
Admitting: INTERNAL MEDICINE
Payer: MEDICARE

## 2025-01-19 ENCOUNTER — APPOINTMENT (EMERGENCY)
Dept: RADIOLOGY | Facility: HOSPITAL | Age: 80
DRG: 291 | End: 2025-01-19
Payer: MEDICARE

## 2025-01-19 DIAGNOSIS — I10 PRIMARY HYPERTENSION: ICD-10-CM

## 2025-01-19 DIAGNOSIS — E87.1 HYPONATREMIA: ICD-10-CM

## 2025-01-19 DIAGNOSIS — I50.9 CHF EXACERBATION (HCC): ICD-10-CM

## 2025-01-19 DIAGNOSIS — I50.32 CHRONIC DIASTOLIC CONGESTIVE HEART FAILURE (HCC): Primary | ICD-10-CM

## 2025-01-19 DIAGNOSIS — R09.81 CONGESTION OF NASAL SINUS: ICD-10-CM

## 2025-01-19 DIAGNOSIS — E11.9 TYPE 2 DIABETES MELLITUS (HCC): ICD-10-CM

## 2025-01-19 DIAGNOSIS — E44.0 MODERATE PROTEIN-CALORIE MALNUTRITION (HCC): ICD-10-CM

## 2025-01-19 DIAGNOSIS — I50.32 CHRONIC HEART FAILURE WITH PRESERVED EJECTION FRACTION (HFPEF) (HCC): ICD-10-CM

## 2025-01-19 DIAGNOSIS — J44.9 CHRONIC OBSTRUCTIVE PULMONARY DISEASE, UNSPECIFIED COPD TYPE (HCC): ICD-10-CM

## 2025-01-19 DIAGNOSIS — K25.9 PREPYLORIC ULCER, UNSPECIFIED ULCER CHRONICITY: ICD-10-CM

## 2025-01-19 PROBLEM — N18.9 ACUTE KIDNEY INJURY SUPERIMPOSED ON CHRONIC KIDNEY DISEASE  (HCC): Status: RESOLVED | Noted: 2024-10-18 | Resolved: 2025-01-19

## 2025-01-19 PROBLEM — R41.82 AMS (ALTERED MENTAL STATUS): Status: RESOLVED | Noted: 2024-01-13 | Resolved: 2025-01-19

## 2025-01-19 PROBLEM — M54.50 ACUTE LOW BACK PAIN: Status: RESOLVED | Noted: 2024-10-13 | Resolved: 2025-01-19

## 2025-01-19 PROBLEM — N18.32 STAGE 3B CHRONIC KIDNEY DISEASE (HCC): Status: ACTIVE | Noted: 2024-11-25

## 2025-01-19 PROBLEM — K59.00 CONSTIPATION: Chronic | Status: RESOLVED | Noted: 2024-06-05 | Resolved: 2025-01-19

## 2025-01-19 PROBLEM — R06.02 EXERTIONAL SHORTNESS OF BREATH: Status: RESOLVED | Noted: 2023-11-28 | Resolved: 2025-01-19

## 2025-01-19 PROBLEM — M79.604 LEG PAIN, RIGHT: Status: RESOLVED | Noted: 2024-05-24 | Resolved: 2025-01-19

## 2025-01-19 PROBLEM — M79.605 PAIN IN BOTH LOWER EXTREMITIES: Status: RESOLVED | Noted: 2024-10-30 | Resolved: 2025-01-19

## 2025-01-19 PROBLEM — N36.44 DETRUSOR SPHINCTER DYSSYNERGIA: Status: RESOLVED | Noted: 2024-10-01 | Resolved: 2025-01-19

## 2025-01-19 PROBLEM — M79.604 PAIN IN BOTH LOWER EXTREMITIES: Status: RESOLVED | Noted: 2024-10-30 | Resolved: 2025-01-19

## 2025-01-19 PROBLEM — N17.9 ACUTE KIDNEY INJURY SUPERIMPOSED ON CHRONIC KIDNEY DISEASE  (HCC): Status: RESOLVED | Noted: 2024-10-18 | Resolved: 2025-01-19

## 2025-01-19 PROBLEM — I16.0 HYPERTENSIVE URGENCY: Status: RESOLVED | Noted: 2024-01-13 | Resolved: 2025-01-19

## 2025-01-19 PROBLEM — R19.7 DIARRHEA: Status: RESOLVED | Noted: 2021-05-11 | Resolved: 2025-01-19

## 2025-01-19 PROBLEM — R60.1 GENERALIZED EDEMA: Status: RESOLVED | Noted: 2024-09-26 | Resolved: 2025-01-19

## 2025-01-19 PROBLEM — Z98.890 HISTORY OF LUMBAR SURGERY: Status: RESOLVED | Noted: 2023-11-10 | Resolved: 2025-01-19

## 2025-01-19 PROBLEM — N18.32 ANEMIA IN STAGE 3B CHRONIC KIDNEY DISEASE  (HCC): Status: ACTIVE | Noted: 2023-02-23

## 2025-01-19 PROBLEM — M48.061 DEGENERATIVE LUMBAR SPINAL STENOSIS: Status: RESOLVED | Noted: 2017-01-25 | Resolved: 2025-01-19

## 2025-01-19 PROBLEM — R82.90 ABNORMAL URINALYSIS: Status: RESOLVED | Noted: 2023-08-27 | Resolved: 2025-01-19

## 2025-01-19 PROBLEM — M96.1 FAILED BACK SURGICAL SYNDROME: Status: RESOLVED | Noted: 2018-03-16 | Resolved: 2025-01-19

## 2025-01-19 PROBLEM — R60.0 LOWER EXTREMITY EDEMA: Status: RESOLVED | Noted: 2023-02-23 | Resolved: 2025-01-19

## 2025-01-19 PROBLEM — Z02.2 ENCOUNTER FOR EXAMINATION FOR ADMISSION TO NURSING HOME: Status: RESOLVED | Noted: 2024-01-13 | Resolved: 2025-01-19

## 2025-01-19 LAB
2HR DELTA HS TROPONIN: -1 NG/L
4HR DELTA HS TROPONIN: -1 NG/L
ALBUMIN SERPL BCG-MCNC: 3.6 G/DL (ref 3.5–5)
ALP SERPL-CCNC: 98 U/L (ref 34–104)
ALT SERPL W P-5'-P-CCNC: 9 U/L (ref 7–52)
ANION GAP SERPL CALCULATED.3IONS-SCNC: 8 MMOL/L (ref 4–13)
AST SERPL W P-5'-P-CCNC: 14 U/L (ref 13–39)
ATRIAL RATE: 84 BPM
ATRIAL RATE: 86 BPM
ATRIAL RATE: 88 BPM
BASE EX.OXY STD BLDV CALC-SCNC: 48.6 % (ref 60–80)
BASE EXCESS BLDV CALC-SCNC: 4.2 MMOL/L
BASOPHILS # BLD AUTO: 0.02 THOUSANDS/ΜL (ref 0–0.1)
BASOPHILS NFR BLD AUTO: 0 % (ref 0–1)
BILIRUB SERPL-MCNC: 0.44 MG/DL (ref 0.2–1)
BNP SERPL-MCNC: 222 PG/ML (ref 0–100)
BUN SERPL-MCNC: 24 MG/DL (ref 5–25)
CALCIUM SERPL-MCNC: 8.8 MG/DL (ref 8.4–10.2)
CARDIAC TROPONIN I PNL SERPL HS: 7 NG/L (ref ?–50)
CARDIAC TROPONIN I PNL SERPL HS: 7 NG/L (ref ?–50)
CARDIAC TROPONIN I PNL SERPL HS: 8 NG/L (ref ?–50)
CHLORIDE SERPL-SCNC: 85 MMOL/L (ref 96–108)
CO2 SERPL-SCNC: 33 MMOL/L (ref 21–32)
CREAT SERPL-MCNC: 1.36 MG/DL (ref 0.6–1.3)
EOSINOPHIL # BLD AUTO: 0.32 THOUSAND/ΜL (ref 0–0.61)
EOSINOPHIL NFR BLD AUTO: 5 % (ref 0–6)
ERYTHROCYTE [DISTWIDTH] IN BLOOD BY AUTOMATED COUNT: 14.6 % (ref 11.6–15.1)
FLUAV AG UPPER RESP QL IA.RAPID: NEGATIVE
FLUBV AG UPPER RESP QL IA.RAPID: NEGATIVE
GFR SERPL CREATININE-BSD FRML MDRD: 36 ML/MIN/1.73SQ M
GLUCOSE SERPL-MCNC: 109 MG/DL (ref 65–140)
GLUCOSE SERPL-MCNC: 114 MG/DL (ref 65–140)
GLUCOSE SERPL-MCNC: 120 MG/DL (ref 65–140)
GLUCOSE SERPL-MCNC: 126 MG/DL (ref 65–140)
GLUCOSE SERPL-MCNC: 146 MG/DL (ref 65–140)
HCO3 BLDV-SCNC: 29.7 MMOL/L (ref 24–30)
HCT VFR BLD AUTO: 28.6 % (ref 34.8–46.1)
HGB BLD-MCNC: 8.7 G/DL (ref 11.5–15.4)
IMM GRANULOCYTES # BLD AUTO: 0.02 THOUSAND/UL (ref 0–0.2)
IMM GRANULOCYTES NFR BLD AUTO: 0 % (ref 0–2)
LYMPHOCYTES # BLD AUTO: 1.77 THOUSANDS/ΜL (ref 0.6–4.47)
LYMPHOCYTES NFR BLD AUTO: 26 % (ref 14–44)
MCH RBC QN AUTO: 27.2 PG (ref 26.8–34.3)
MCHC RBC AUTO-ENTMCNC: 30.4 G/DL (ref 31.4–37.4)
MCV RBC AUTO: 89 FL (ref 82–98)
MONOCYTES # BLD AUTO: 0.6 THOUSAND/ΜL (ref 0.17–1.22)
MONOCYTES NFR BLD AUTO: 9 % (ref 4–12)
NEUTROPHILS # BLD AUTO: 4.19 THOUSANDS/ΜL (ref 1.85–7.62)
NEUTS SEG NFR BLD AUTO: 60 % (ref 43–75)
NRBC BLD AUTO-RTO: 0 /100 WBCS
O2 CT BLDV-SCNC: 6.4 ML/DL
P AXIS: 25 DEGREES
P AXIS: 26 DEGREES
P AXIS: 44 DEGREES
PCO2 BLDV: 49.8 MM HG (ref 42–50)
PH BLDV: 7.39 [PH] (ref 7.3–7.4)
PLATELET # BLD AUTO: 317 THOUSANDS/UL (ref 149–390)
PMV BLD AUTO: 9.6 FL (ref 8.9–12.7)
PO2 BLDV: 27.3 MM HG (ref 35–45)
POTASSIUM SERPL-SCNC: 4.2 MMOL/L (ref 3.5–5.3)
PR INTERVAL: 152 MS
PR INTERVAL: 158 MS
PR INTERVAL: 160 MS
PROT SERPL-MCNC: 6.8 G/DL (ref 6.4–8.4)
QRS AXIS: -11 DEGREES
QRS AXIS: -14 DEGREES
QRS AXIS: 9 DEGREES
QRSD INTERVAL: 134 MS
QRSD INTERVAL: 134 MS
QRSD INTERVAL: 136 MS
QT INTERVAL: 438 MS
QT INTERVAL: 440 MS
QT INTERVAL: 442 MS
QTC INTERVAL: 522 MS
QTC INTERVAL: 526 MS
QTC INTERVAL: 529 MS
RBC # BLD AUTO: 3.2 MILLION/UL (ref 3.81–5.12)
SARS-COV+SARS-COV-2 AG RESP QL IA.RAPID: NEGATIVE
SODIUM SERPL-SCNC: 126 MMOL/L (ref 135–147)
T WAVE AXIS: 18 DEGREES
T WAVE AXIS: 27 DEGREES
T WAVE AXIS: 28 DEGREES
VENTRICULAR RATE: 84 BPM
VENTRICULAR RATE: 86 BPM
VENTRICULAR RATE: 88 BPM
WBC # BLD AUTO: 6.92 THOUSAND/UL (ref 4.31–10.16)

## 2025-01-19 PROCEDURE — 83880 ASSAY OF NATRIURETIC PEPTIDE: CPT

## 2025-01-19 PROCEDURE — 71045 X-RAY EXAM CHEST 1 VIEW: CPT

## 2025-01-19 PROCEDURE — 99285 EMERGENCY DEPT VISIT HI MDM: CPT

## 2025-01-19 PROCEDURE — 87081 CULTURE SCREEN ONLY: CPT | Performed by: STUDENT IN AN ORGANIZED HEALTH CARE EDUCATION/TRAINING PROGRAM

## 2025-01-19 PROCEDURE — 82948 REAGENT STRIP/BLOOD GLUCOSE: CPT

## 2025-01-19 PROCEDURE — 94640 AIRWAY INHALATION TREATMENT: CPT

## 2025-01-19 PROCEDURE — 87804 INFLUENZA ASSAY W/OPTIC: CPT

## 2025-01-19 PROCEDURE — 87811 SARS-COV-2 COVID19 W/OPTIC: CPT

## 2025-01-19 PROCEDURE — 93005 ELECTROCARDIOGRAM TRACING: CPT

## 2025-01-19 PROCEDURE — 36415 COLL VENOUS BLD VENIPUNCTURE: CPT

## 2025-01-19 PROCEDURE — 82805 BLOOD GASES W/O2 SATURATION: CPT

## 2025-01-19 PROCEDURE — 96374 THER/PROPH/DIAG INJ IV PUSH: CPT

## 2025-01-19 PROCEDURE — 84484 ASSAY OF TROPONIN QUANT: CPT

## 2025-01-19 PROCEDURE — 99215 OFFICE O/P EST HI 40 MIN: CPT | Performed by: INTERNAL MEDICINE

## 2025-01-19 PROCEDURE — 85025 COMPLETE CBC W/AUTO DIFF WBC: CPT

## 2025-01-19 PROCEDURE — 93010 ELECTROCARDIOGRAM REPORT: CPT | Performed by: INTERNAL MEDICINE

## 2025-01-19 PROCEDURE — 99223 1ST HOSP IP/OBS HIGH 75: CPT | Performed by: INTERNAL MEDICINE

## 2025-01-19 PROCEDURE — 96375 TX/PRO/DX INJ NEW DRUG ADDON: CPT

## 2025-01-19 PROCEDURE — 80053 COMPREHEN METABOLIC PANEL: CPT

## 2025-01-19 RX ORDER — POLYETHYLENE GLYCOL 3350 17 G/17G
17 POWDER, FOR SOLUTION ORAL DAILY PRN
Status: DISCONTINUED | OUTPATIENT
Start: 2025-01-19 | End: 2025-01-23 | Stop reason: HOSPADM

## 2025-01-19 RX ORDER — ATORVASTATIN CALCIUM 40 MG/1
40 TABLET, FILM COATED ORAL
Status: DISCONTINUED | OUTPATIENT
Start: 2025-01-19 | End: 2025-01-23 | Stop reason: HOSPADM

## 2025-01-19 RX ORDER — INSULIN LISPRO 100 [IU]/ML
1-6 INJECTION, SOLUTION INTRAVENOUS; SUBCUTANEOUS
Status: DISCONTINUED | OUTPATIENT
Start: 2025-01-19 | End: 2025-01-23 | Stop reason: HOSPADM

## 2025-01-19 RX ORDER — FENTANYL 50 UG/1
1 PATCH TRANSDERMAL
COMMUNITY

## 2025-01-19 RX ORDER — METOPROLOL SUCCINATE 25 MG/1
12.5 TABLET, EXTENDED RELEASE ORAL DAILY
Status: DISCONTINUED | OUTPATIENT
Start: 2025-01-19 | End: 2025-01-23 | Stop reason: HOSPADM

## 2025-01-19 RX ORDER — HEPARIN SODIUM 5000 [USP'U]/ML
5000 INJECTION, SOLUTION INTRAVENOUS; SUBCUTANEOUS EVERY 8 HOURS SCHEDULED
Status: DISCONTINUED | OUTPATIENT
Start: 2025-01-19 | End: 2025-01-23 | Stop reason: HOSPADM

## 2025-01-19 RX ORDER — FUROSEMIDE 10 MG/ML
80 INJECTION INTRAMUSCULAR; INTRAVENOUS ONCE
Status: DISCONTINUED | OUTPATIENT
Start: 2025-01-19 | End: 2025-01-19

## 2025-01-19 RX ORDER — FUROSEMIDE 10 MG/ML
40 INJECTION INTRAMUSCULAR; INTRAVENOUS ONCE
Status: COMPLETED | OUTPATIENT
Start: 2025-01-19 | End: 2025-01-19

## 2025-01-19 RX ORDER — LIDOCAINE 4 G/G
4 PATCH TOPICAL
COMMUNITY

## 2025-01-19 RX ORDER — LEVOTHYROXINE SODIUM 75 UG/1
37.5 TABLET ORAL
Status: DISCONTINUED | OUTPATIENT
Start: 2025-01-19 | End: 2025-01-23 | Stop reason: HOSPADM

## 2025-01-19 RX ORDER — MORPHINE SULFATE 15 MG/1
15 TABLET ORAL EVERY 6 HOURS PRN
COMMUNITY

## 2025-01-19 RX ORDER — ONDANSETRON 2 MG/ML
4 INJECTION INTRAMUSCULAR; INTRAVENOUS EVERY 6 HOURS PRN
Status: DISCONTINUED | OUTPATIENT
Start: 2025-01-19 | End: 2025-01-23 | Stop reason: HOSPADM

## 2025-01-19 RX ORDER — MORPHINE SULFATE 15 MG/1
15 TABLET ORAL EVERY 6 HOURS PRN
Status: DISCONTINUED | OUTPATIENT
Start: 2025-01-19 | End: 2025-01-23 | Stop reason: HOSPADM

## 2025-01-19 RX ORDER — FUROSEMIDE 10 MG/ML
50 INJECTION INTRAMUSCULAR; INTRAVENOUS 2 TIMES DAILY
Status: DISCONTINUED | OUTPATIENT
Start: 2025-01-19 | End: 2025-01-21

## 2025-01-19 RX ORDER — ERGOCALCIFEROL 1.25 MG/1
50000 CAPSULE, LIQUID FILLED ORAL
COMMUNITY

## 2025-01-19 RX ORDER — ONDANSETRON 2 MG/ML
4 INJECTION INTRAMUSCULAR; INTRAVENOUS ONCE
Status: COMPLETED | OUTPATIENT
Start: 2025-01-19 | End: 2025-01-19

## 2025-01-19 RX ORDER — METHOCARBAMOL 750 MG/1
750 TABLET, FILM COATED ORAL EVERY 8 HOURS SCHEDULED
COMMUNITY

## 2025-01-19 RX ORDER — ASPIRIN 81 MG/1
81 TABLET, CHEWABLE ORAL DAILY
Status: DISCONTINUED | OUTPATIENT
Start: 2025-01-19 | End: 2025-01-23 | Stop reason: HOSPADM

## 2025-01-19 RX ORDER — TORSEMIDE 20 MG/1
20 TABLET ORAL DAILY
COMMUNITY
End: 2025-01-23

## 2025-01-19 RX ORDER — ALBUTEROL SULFATE 0.83 MG/ML
5 SOLUTION RESPIRATORY (INHALATION) ONCE
Status: COMPLETED | OUTPATIENT
Start: 2025-01-19 | End: 2025-01-19

## 2025-01-19 RX ORDER — FAMOTIDINE 20 MG/1
20 TABLET, FILM COATED ORAL 2 TIMES DAILY
COMMUNITY

## 2025-01-19 RX ORDER — SIMETHICONE 80 MG
80 TABLET,CHEWABLE ORAL EVERY 6 HOURS PRN
COMMUNITY

## 2025-01-19 RX ORDER — MAGNESIUM HYDROXIDE/ALUMINUM HYDROXICE/SIMETHICONE 120; 1200; 1200 MG/30ML; MG/30ML; MG/30ML
30 SUSPENSION ORAL EVERY 6 HOURS PRN
Status: DISCONTINUED | OUTPATIENT
Start: 2025-01-19 | End: 2025-01-23 | Stop reason: HOSPADM

## 2025-01-19 RX ADMIN — METOPROLOL SUCCINATE 12.5 MG: 25 TABLET, EXTENDED RELEASE ORAL at 08:03

## 2025-01-19 RX ADMIN — ATORVASTATIN CALCIUM 40 MG: 40 TABLET, FILM COATED ORAL at 17:18

## 2025-01-19 RX ADMIN — IPRATROPIUM BROMIDE 0.5 MG: 0.5 SOLUTION RESPIRATORY (INHALATION) at 04:00

## 2025-01-19 RX ADMIN — MORPHINE SULFATE 15 MG: 15 TABLET ORAL at 13:12

## 2025-01-19 RX ADMIN — ASPIRIN 81 MG CHEWABLE TABLET 81 MG: 81 TABLET CHEWABLE at 08:02

## 2025-01-19 RX ADMIN — FUROSEMIDE 40 MG: 10 INJECTION, SOLUTION INTRAVENOUS at 04:04

## 2025-01-19 RX ADMIN — MORPHINE SULFATE 15 MG: 15 TABLET ORAL at 23:35

## 2025-01-19 RX ADMIN — ONDANSETRON 4 MG: 2 INJECTION INTRAMUSCULAR; INTRAVENOUS at 08:28

## 2025-01-19 RX ADMIN — HEPARIN SODIUM 5000 UNITS: 5000 INJECTION INTRAVENOUS; SUBCUTANEOUS at 22:31

## 2025-01-19 RX ADMIN — HEPARIN SODIUM 5000 UNITS: 5000 INJECTION INTRAVENOUS; SUBCUTANEOUS at 07:59

## 2025-01-19 RX ADMIN — LEVOTHYROXINE SODIUM 37.5 MCG: 75 TABLET ORAL at 07:58

## 2025-01-19 RX ADMIN — ONDANSETRON 4 MG: 2 INJECTION INTRAMUSCULAR; INTRAVENOUS at 03:49

## 2025-01-19 RX ADMIN — ALBUTEROL SULFATE 5 MG: 2.5 SOLUTION RESPIRATORY (INHALATION) at 04:00

## 2025-01-19 RX ADMIN — HEPARIN SODIUM 5000 UNITS: 5000 INJECTION INTRAVENOUS; SUBCUTANEOUS at 13:12

## 2025-01-19 RX ADMIN — FUROSEMIDE 50 MG: 10 INJECTION, SOLUTION INTRAVENOUS at 17:18

## 2025-01-19 NOTE — ASSESSMENT & PLAN NOTE
"Lab Results   Component Value Date    HGBA1C 7.4 (H) 10/12/2024   No results for input(s): \"POCGLU\" in the last 72 hours.  Blood Sugar Average: Last 72 hrs:    A1c near goal  Outpatient regimen: metformin 500mg twice daily     Plan:  BG goal 140-200 while inpatient  SSI  Holding oral antihyperglycemics  "

## 2025-01-19 NOTE — CONSULTS
Consultation - Cardiology   Name: Mattie Varela 79 y.o. female I MRN: [id number]  Unit/Bed#: E5 -01 I Date of Admission: 1/19/2025   Date of Service: 1/19/2025 I Hospital Day: 0   Inpatient consult to Cardiology  Consult performed by: Jackson Morris MD  Consult ordered by: Gee Loza MD        Physician Requesting Evaluation: Gee Loza MD   Reason for Evaluation / Principal Problem: Acute    Assessment & Plan  Acute on chronic diastolic congestive heart failure (HCC)  Wt Readings from Last 3 Encounters:   01/19/25 85.2 kg (187 lb 13.3 oz)   12/20/24 81.2 kg (179 lb)   12/02/24 81.3 kg (179 lb 3.7 oz)     Limited TTE 11/22/2024: EF 51% septum is akinetic to paradoxical.  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  TTE 5/23/2024: LVEF 70%, grade 1 diastolic dysfunction, mild LVOT obstruction, moderate annual calcification, mild tricuspid regurgitation.    Home heart failure regimen: Torsemide 30 mg twice daily and extra 20 mg as needed -- magnesium 400 twice daily --.  Current HF Meds: Furosemide 50 mg IV twice daily aspirin 81 mg daily atorvastatin 40 mg daily metoprolol succinate 12.5 mg daily.    Labs: Sodium 126 potassium 4.2 chloride 85 bicarb 33 BUN 24 creatinine 1.36 GFR 36 -- normal LFTs --  -- negative high sensitive troponins x 3.    CXR 1/19/2025: Cardiomediastinal silhouette.  No pulmonary vascular congestion noted.  Raised right hemidiaphragm.  No consolidation or pleural effusion.    PLAN:  Agree with continuing current regimen  Will follow-up on urine output and follow-up on labs in AM.  Correct electrolyte and metabolic abnormalities.  Heart failure precautions and monitoring are advised.    Type 2 diabetes mellitus with other skin complications (HCC)  Lab Results   Component Value Date    HGBA1C 7.4 (H) 10/12/2024     Recent Labs     01/19/25  0902 01/19/25  1141 01/19/25  1614   POCGLU 114 109 146*     Reasonably controlled.  -- Will continue current regimen.  Stage 3b  chronic kidney disease (HCC)  Lab Results   Component Value Date    EGFR 36 01/19/2025    EGFR 31 12/02/2024    EGFR 30 11/30/2024    CREATININE 1.36 (H) 01/19/2025    CREATININE 1.54 (H) 12/02/2024    CREATININE 1.56 (H) 11/30/2024     Stage IIIb chronic kidney disease.  Overall renal function stable.  Has chronic urinary retention.  Continue monitoring urine output and renal function in AM.  Anemia in stage 3b chronic kidney disease  (HCC)  Chronic anemia hemoglobin 8.7 hematocrit 28.6 platelet count 317 WBC 6.92  Recommend Venofer since patient had Marked iron deficiency in November 2024.  Hyponatremia  Defer management to medicine clinics.    Please contact the [employer] role for the Cardiology service with any questions/concerns.    History of Present Illness   Mattie Varela is a 79 y.o. female who presents with to emergency room with increasing lower extremity swelling, decreased urinary output and cough. Her  prior medical history is significant for:  Hypertension  Heart failure with preserved ejection fraction  Dizziness  History of stroke symptoms  Stage IIIb chronic kidney disease chronic urinary retention, status post spinal close atrial fibrillation  Hypothyroidism  Chronic pain disorder  Insomnia  Degenerative joint disease with lumbar stenosis    She is a resident at an assisted living facility.  She states that for Marked few days she has been spearing seeing mild shortness of breath and had some swelling of the legs.  She was intermittently taking extra torsemide pill.  Early this morning she woke up with shortness of breath and had very hard time breathing and that is when EMS was called.  Upon arrival here she was noted to be hypoxemic.  Currently she feels better.  Denies recent palpitations.  She does report some confusion.      Her most recent admission to hospital was between November 17 and December 2, 2024.  She had presented after a fall and was found to have patellar fracture.  During  the hospitalization she was also treated for decompensated heart failure.    She denies any chest pain but does have shortness of breath.    Review of Systems   Constitutional:  Positive for activity change and fatigue.   Respiratory:  Positive for cough and shortness of breath.    Cardiovascular:  Positive for leg swelling. Negative for palpitations.   Gastrointestinal:  Positive for abdominal distention.   Genitourinary: Negative.    Musculoskeletal:  Positive for arthralgias, back pain and joint swelling.   Skin:  Positive for wound.   Neurological:  Negative for syncope.        Reports some confusion   Psychiatric/Behavioral: Negative.       I have reviewed the patient's PMH, PSH, Social History, Family History, Meds, and Allergies    Objective :  Temp:  [97.8 °F (36.6 °C)-98.1 °F (36.7 °C)] 98.1 °F (36.7 °C)  HR:  [80-97] 95  BP: (115-158)/(54-74) 157/74  Resp:  [12-19] 17  SpO2:  [84 %-99 %] 94 %  O2 Device: None (Room air)  Nasal Cannula O2 Flow Rate (L/min):  [2 L/min-6 L/min] 3 L/min  Orthostatic Blood Pressures      Flowsheet Row Most Recent Value   Blood Pressure 157/74 filed at 01/19/2025 1525   Patient Position - Orthostatic VS Sitting filed at 01/19/2025 1525          First Weight: Weight - Scale: 85.2 kg (187 lb 13.3 oz) (01/19/25 0324)  Vitals:    01/19/25 0324   Weight: 85.2 kg (187 lb 13.3 oz)     Physical Exam  Constitutional:       Appearance: Normal appearance. She is obese.   HENT:      Mouth/Throat:      Mouth: Mucous membranes are moist.   Cardiovascular:      Rate and Rhythm: Normal rate and regular rhythm.      Heart sounds: No murmur heard.     Comments: No jugular venous distention.  Pulmonary:      Comments: Decreased BS, without crackles.  Musculoskeletal:      Right lower leg: Edema present.      Left lower leg: Edema present.   Skin:     General: Skin is warm and dry.   Psychiatric:         Mood and Affect: Mood normal.         Behavior: Behavior normal.         Thought Content:  Thought content normal.         Lab Results: I have reviewed the following results:CBC/BMP:   .     01/19/25  0347   WBC 6.92   HGB 8.7*   HCT 28.6*      SODIUM 126*   K 4.2   CL 85*   CO2 33*   BUN 24   CREATININE 1.36*   GLUC 120      Results from last 7 days   Lab Units 01/19/25  0347   WBC Thousand/uL 6.92   HEMOGLOBIN g/dL 8.7*   HEMATOCRIT % 28.6*   PLATELETS Thousands/uL 317     Results from last 7 days   Lab Units 01/19/25  0347   POTASSIUM mmol/L 4.2   CHLORIDE mmol/L 85*   CO2 mmol/L 33*   BUN mg/dL 24   CREATININE mg/dL 1.36*   CALCIUM mg/dL 8.8         Lab Results   Component Value Date    HGBA1C 7.4 (H) 10/12/2024     Lab Results   Component Value Date    CKTOTAL 50 05/26/2024    TROPONINI 0.04 07/16/2020       Imaging Results Review: I reviewed radiology reports from this admission including: chest xray.  Other Study Results Review: EKG was reviewed.     VTE Prophylaxis: Heparin

## 2025-01-19 NOTE — ASSESSMENT & PLAN NOTE
Chronic anemia hemoglobin 8.7 hematocrit 28.6 platelet count 317 WBC 6.92  Recommend Venofer since patient had Marked iron deficiency in November 2024.

## 2025-01-19 NOTE — ASSESSMENT & PLAN NOTE
Lab Results   Component Value Date    HGBA1C 7.4 (H) 10/12/2024     Recent Labs     01/19/25  0902 01/19/25  1141 01/19/25  1614   POCGLU 114 109 146*     Reasonably controlled.  -- Will continue current regimen.

## 2025-01-19 NOTE — ED ATTENDING ATTESTATION
I, Marlon Aguilar DO, saw and evaluated the patient. All available labs and X-rays were reviewed. I discussed the patient with the resident / non-physician and agree with the resident's / non-physician practitioner's findings and plan as documented in the resident's / non-physician practicitioner's note, except where noted. At this point, I agree with the current assessment done in the ED.     NAME: Mattie Varela  AGE: 79 y.o. SEX: female  : 1945   MRN: [id number]  ENCOUNTER: [id number]    Disposition   Principal Problem:    Acute on chronic diastolic congestive heart failure (HCC)  Active Problems:    Type 2 diabetes mellitus with other skin complications (HCC)    Anemia in stage 3b chronic kidney disease  (HCC)    Stage 3b chronic kidney disease (HCC)    Hyponatremia      ED Disposition       ED Disposition   Admit    Condition   Stable    Date/Time   Sun 2025  4:16 AM    Comment   --                Assessment/Plan   Medical Decision Making  Patient with history as below presented with leg swelling. History obtained from patient as well as nurses who directly obtained from EMS prior to my arrival.    Differential diagnosis includes: CHF with acute exacerbation, URI, COPD exacerbation, ACS, arrhythmia, anemia, electrolyte disturbance, pneumothorax, pneumonia    Plan: CBC, CMP, troponin, BNP, VBG, chest x-ray, ECG, Lasix, nebulizer    ECG independently interpreted by myself as below. Labs reviewed and with a hyponatremia, mildly elevated BNP.  Suspect hyponatremia secondary to volume overload. Independently reviewed imaging with no acute cardiopulmonary disease.  Clinically, patient volume overloaded.  She has weight gain and subjective shortness of breath.  She is also hyponatremic which I suspect to be due to volume overload.  Presentation most consistent with acute exacerbation of CHF. Discussed patient's management with medicine who agreed to admit patient under their service.    Amount and/or  Complexity of Data Reviewed  Labs: ordered.  Radiology: ordered and independent interpretation performed.    Risk  Prescription drug management.  Decision regarding hospitalization.                        History of Present Illness     Patient is a 79 y.o. female with a significant past medical history of CHF, asthma, COPD, diabetes, presenting for evaluation of leg swelling and shortness of breath.  Patient is a poor historian.  Patient reports that over the last day she has had some increased swelling in her legs.  Today she was brought in at either her request or the request of her assisted living facility staff.  She does note that she is increasingly short of breath.  She seems to indicate that this feels similar to previous CHF exacerbations.  She is unsure if she has been gaining weight.  She is unable to tell me if she has been coughing or had any fevers.  She denies any chest pain.  She denies pain with breathing.  Patient otherwise without concern.    Past Medical History     Past Medical History:   Diagnosis Date    Acid reflux     Acute kidney injury (HCC) 06/18/2022    Anemia     hx of iron-deficient    Anxiety     Arthritis     Asthma     last needed inhaler last year 2020    Basal cell carcinoma     upper lip    Chronic narcotic dependence (HCC)     Chronic pain     Colitis 11/19/2024    Colon polyp     Cystocele     Diabetes mellitus (HCC)     stable    Disease of thyroid gland     hypothyroidism    Diverticulosis     Dizziness     at times    Dysfunctional uterine bleeding     last assessed - [id number]    Dysphagia     Fibromyalgia     Gastric ulcer     Gastroparesis     History of colonic polyps     last assessed - [id number]    History of gastroesophageal reflux (GERD)     Hypercholesterolemia     Hyperlipidemia     Hypertension     Hyponatremia 01/13/2024    IBS (irritable bowel syndrome)     Pneumobilia 06/18/2022    Post laminectomy syndrome     S/P insertion of spinal cord stimulator 07/18/2018     s/p [employer] loop recorder 3/2/2024 03/02/2024    Seasonal allergies     Shortness of breath     exertional    Spinal stenosis     Status post lumbar spinal fusion 03/16/2018    Stroke (HCC)     pt states slight stroke March 2022       Past Surgical History     Past Surgical History:   Procedure Laterality Date    APPENDECTOMY      BACK SURGERY      BREAST CYST EXCISION Left     CARDIAC CATHETERIZATION  11/14/2023    Procedure: Cardiac catheterization;  Surgeon: Randolph Holt MD;  [facility];  Service: Cardiology    CARDIAC CATHETERIZATION N/A 11/14/2023    Procedure: Cardiac Coronary Angiogram;  Surgeon: Randolph Holt MD;  [facility];  Service: Cardiology    CARDIAC ELECTROPHYSIOLOGY PROCEDURE N/A 3/2/2024    Procedure: Cardiac loop recorder implant;  Surgeon: Edu Fontaine MD;  [facility];  Service: Cardiology    CHOLECYSTECTOMY      COLONOSCOPY      ESOPHAGOGASTRODUODENOSCOPY N/A 09/28/2016    Procedure: ESOPHAGOGASTRODUODENOSCOPY (EGD);  Surgeon: Mylene Moeller MD;  [facility]: [facility];  Service:     HERNIA REPAIR      HYSTERECTOMY      TTAH-BSO age 28    LAMINECTOMY      LUMBAR LAMINECTOMY      OOPHORECTOMY      age 28    IN ARTHRODESIS POSTERIOR/PSTLAT TQ 1NTRSPC THORACIC N/A 06/04/2018    Procedure: Reopening of lumbar incision for T12-L5 posterior instrumented fixation and fusion and T12-L4 posterior decompression;  Surgeon: Wood Rogel MD;  [facility];  Service: Neurosurgery    IN COLONOSCOPY FLX DX W/COLLJ SPEC WHEN PFRMD N/A 03/02/2016    Procedure: EGD AND COLONOSCOPY;  Surgeon: Mylene Moeller MD;  [facility];  Service: Gastroenterology    IN DILATION ESOPH UNGUIDED SOUND/BOUGIE 1/MULT PASS N/A 09/28/2016    Procedure: DILATATION ESOPHAGEAL;  Surgeon: Mylene Moeller MD;  [facility];  Service: Gastroenterology    IN ESOPHAGOGASTRODUODENOSCOPY TRANSORAL DIAGNOSTIC N/A 07/18/2016    Procedure:  ESOPHAGOGASTRODUODENOSCOPY (EGD);  Surgeon: Mylene Moeller MD;  [facility];  Service: Gastroenterology    ND INSJ/RPLCMT SPINAL NPG/RCVR POCKET CRTJ&CONNJ Left 2018    Procedure: removal of left buttock implantable pulse generator and placement of new  implantable pulse generator;  Surgeon: Wood Rogel MD;  [facility];  Service: Neurosurgery       Social History     Social History     Substance and Sexual Activity   Alcohol Use Not Currently    Comment: Denied history of alcohol use     Social History     Substance and Sexual Activity   Drug Use No    Comment: Denied history of drug use     Social History     Tobacco Use   Smoking Status Former    Current packs/day: 0.00    Types: Cigarettes    Quit date: 1970    Years since quittin.0   Smokeless Tobacco Never   Tobacco Comments    Denied history of current ever day smoker, Former smoker and Never smoker all documented in [employer]       Family History     Family History   Problem Relation Age of Onset    Lung cancer Mother 46    Pulmonary embolism Father     No Known Problems Sister     No Known Problems Daughter     No Known Problems Daughter     Stroke Maternal Grandmother     Heart attack Maternal Grandfather     No Known Problems Paternal Grandmother     No Known Problems Paternal Grandfather     No Known Problems Maternal Aunt     Diabetes Family         Diabetes mellitus    Hypertension Family     Stroke Family         Stroke complications       Medications Prior to Admission     Prior to Admission medications    Medication Sig Start Date End Date Taking? Authorizing Provider   acetaminophen (TYLENOL) 325 mg tablet Take 3 tablets (975 mg total) by mouth every 8 (eight) hours 10/19/24   Paco Carmona MD   albuterol (PROVENTIL HFA,VENTOLIN HFA) 90 mcg/act inhaler Inhale 2 puffs every 6 (six) hours as needed for wheezing or shortness of breath 22   NANY Pollock   aspirin 81 mg chewable tablet Chew 81 mg daily     Historical Provider, MD   atorvastatin (LIPITOR) 40 mg tablet TAKE ONE TABLET BY MOUTH ONCE DAILY 11/22/22   NANY Pollock   baclofen 10 mg tablet Take 1 tablet (10 mg total) by mouth 2 (two) times a day as needed for muscle spasms 12/2/24   Alexys Angel,    Blood Glucose Monitoring Suppl (OneTouch Verio Reflect) w/Device KIT Check blood sugars twice daily. Please substitute with appropriate alternative as covered by patient's insurance. Dx: E11.65 2/20/23   NANY Pollock   docusate sodium (COLACE) 100 mg capsule Take 1 capsule (100 mg total) by mouth 2 (two) times a day 6/5/24   NANY Ruby   ferrous sulfate 325 (65 Fe) mg tablet Take 1 tablet (325 mg total) by mouth daily with breakfast 1/10/24   Paco Boyer MD   gabapentin (NEURONTIN) 100 mg capsule Take 1 capsule (100 mg total) by mouth daily at bedtime 10/30/24   May Alvarado DO   glucose blood (OneTouch Verio) test strip Check blood sugars twice daily. Please substitute with appropriate alternative as covered by patient's insurance. Dx: E11.65 2/20/23   NANY Pollock   HYDROmorphone (DILAUDID) 2 mg tablet Take 1 tablet (2 mg total) by mouth every 6 (six) hours as needed for moderate pain or severe pain Max Daily Amount: 8 mg 12/2/24   Alexys Angel DO   latanoprost (XALATAN) 0.005 % ophthalmic solution Administer 1 drop to both eyes daily at bedtime    Historical Provider, MD   levothyroxine 75 mcg tablet Take 0.5 tablets (37.5 mcg total) by mouth daily in the early morning 1/17/24 11/17/24  Alejandro Philip MD   lidocaine (LIDODERM) 5 % Apply 1 patch topically over 12 hours daily Remove & Discard patch within 12 hours or as directed by MD 10/20/24   Paco Carmona MD   MAGNESIUM OXIDE 400 PO Take 400 mg by mouth 2 (two) times a day    Historical Provider, MD   meclizine (ANTIVERT) 25 mg tablet Take 1 tablet (25 mg total) by mouth 4 (four) times a day as needed for dizziness  Patient taking  differently: Take 12.5 mg by mouth daily as needed for dizziness 3/26/23 11/17/24  Venice Baires DO   metoprolol succinate (TOPROL-XL) 25 mg 24 hr tablet Take 0.5 tablets (12.5 mg total) by mouth daily 12/3/24   Alexys Angel DO   Milnacipran HCl (Savella) 100 MG TABS Take 1 tablet (100 mg total) by mouth daily 3/5/23   Venice Baires DO   OneTouch Delica Lancets 33G MISC Check blood sugars twice daily. Please substitute with appropriate alternative as covered by patient's insurance. Dx: E11.65 2/20/23   NANY Pollock   pantoprazole (PROTONIX) 40 mg tablet Take 1 tablet (40 mg total) by mouth daily 1/10/24 11/17/24  Paco Boyer MD   polyethylene glycol (MIRALAX) 17 g packet Take 17 g by mouth daily 6/5/24   NANY Ruby   senna (SENOKOT) 8.6 mg Take 2 tablets (17.2 mg total) by mouth daily at bedtime  Patient taking differently: Take 17.2 mg by mouth daily as needed for constipation 6/5/24   NANY Ruby   sucralfate (CARAFATE) 1 g tablet Take 1 tablet (1 g total) by mouth 2 (two) times a day before meals for 7 days 10/19/24 10/26/24  Paco Carmona MD   torsemide (DEMADEX) 10 mg tablet Take 3 tablets (30 mg total) by mouth 2 (two) times a day 12/2/24   Alexys Angel DO       Allergies     Allergies   Allergen Reactions    Penicillins Anaphylaxis, Hives and Other (See Comments)     Other reaction(s): Unknown Reaction    Sulfa Antibiotics Anaphylaxis and Other (See Comments)     Other reaction(s): Unknown Reaction    Aspartame - Food Allergy Other (See Comments) and Hypertension     Slurred speech, weakness, stroke sx    Iodinated Contrast Media Hives     Pt has taken prep prior for contrast and has not had any break through reaction    Keflex [Cephalexin] Hives       Objective     Vitals:    01/19/25 0700 01/19/25 0800 01/19/25 0803 01/19/25 0847   BP: 116/54 138/57 138/57 145/70   BP Location: Right arm Right arm  Right arm   Pulse: 87 94 97 93    Resp: 12 19 14   Temp:    98.1 °F (36.7 °C)   TempSrc:    Oral   SpO2: 95% 93%  99%   Weight:       Height:    5' (1.524 m)     Body mass index is 36.68 kg/m².    Intake/Output Summary (Last 24 hours) at 1/19/2025 0920  Last data filed at 1/19/2025 0833  Gross per 24 hour   Intake 59 ml   Output 1485 ml   Net -1426 ml     Invasive Devices       Peripheral Intravenous Line  Duration             Peripheral IV 01/19/25 Left;Proximal;Ventral (anterior) Forearm <1 day              Drain  Duration             Urethral Catheter 16 Fr. 84 days                    Review of Systems   Respiratory:  Positive for shortness of breath.    Cardiovascular:  Positive for leg swelling. Negative for chest pain.   Gastrointestinal:  Negative for abdominal pain, nausea and vomiting.        Physical Exam  Vitals and nursing note reviewed.   Constitutional:       General: She is not in acute distress.     Appearance: Normal appearance. She is not ill-appearing or toxic-appearing.   HENT:      Head: Normocephalic and atraumatic.      Right Ear: External ear normal.      Left Ear: External ear normal.      Nose: Nose normal.   Eyes:      General: No scleral icterus.        Right eye: No discharge.         Left eye: No discharge.      Extraocular Movements: Extraocular movements intact.      Conjunctiva/sclera: Conjunctivae normal.   Cardiovascular:      Rate and Rhythm: Normal rate.      Heart sounds: Normal heart sounds. No murmur heard.     No friction rub. No gallop.   Pulmonary:      Effort: Pulmonary effort is normal. No respiratory distress.      Breath sounds: Wheezing present.   Abdominal:      General: Abdomen is flat. There is no distension.      Palpations: Abdomen is soft. There is no mass.      Tenderness: There is no abdominal tenderness.   Genitourinary:     Comments: Deferred  Musculoskeletal:      Right lower leg: Edema present.      Left lower leg: Edema present.   Skin:     General: Skin is warm and dry.   Neurological:       General: No focal deficit present.      Mental Status: She is alert.          Medications   aspirin chewable tablet 81 mg (81 mg Oral Given 1/19/25 0802)   atorvastatin (LIPITOR) tablet 40 mg (has no administration in time range)   levothyroxine tablet 37.5 mcg (37.5 mcg Oral Given 1/19/25 0758)   metoprolol succinate (TOPROL-XL) 24 hr tablet 12.5 mg (12.5 mg Oral Given 1/19/25 0803)   morphine (MSIR) IR tablet 15 mg (has no administration in time range)   polyethylene glycol (MIRALAX) packet 17 g (has no administration in time range)   ondansetron (ZOFRAN) injection 4 mg (4 mg Intravenous Given 1/19/25 0828)   aluminum-magnesium hydroxide-simethicone (MAALOX) oral suspension 30 mL (has no administration in time range)   furosemide (LASIX) injection 50 mg (has no administration in time range)   heparin (porcine) subcutaneous injection 5,000 Units (5,000 Units Subcutaneous Given 1/19/25 0759)   insulin lispro (HumALOG/ADMELOG) 100 units/mL subcutaneous injection 1-6 Units ( Subcutaneous Not Given 1/19/25 0902)   ondansetron (ZOFRAN) injection 4 mg (4 mg Intravenous Given 1/19/25 0349)   albuterol inhalation solution 5 mg (5 mg Nebulization Given 1/19/25 0400)   ipratropium (ATROVENT) 0.02 % inhalation solution 0.5 mg (0.5 mg Nebulization Given 1/19/25 0400)   furosemide (LASIX) injection 40 mg (40 mg Intravenous Given 1/19/25 0404)        Results Reviewed       Procedure Component Value Units Date/Time    HS Troponin I 4hr [[id number]]  (Normal) Collected: 01/19/25 0757    Lab Status: Final result Specimen: Blood from Arm, Left Updated: 01/19/25 0838     hs TnI 4hr 7 ng/L      Delta 4hr hsTnI -1 ng/L     HS Troponin I 2hr [[id number]]  (Normal) Collected: 01/19/25 0553    Lab Status: Final result Specimen: Blood from Arm, Left Updated: 01/19/25 0621     hs TnI 2hr 7 ng/L      Delta 2hr hsTnI -1 ng/L     HS Troponin 0hr (reflex protocol) [[id number]]  (Normal) Collected: 01/19/25 0347    Lab Status: Final result  Specimen: Blood from Arm, Left Updated: 01/19/25 0414     hs TnI 0hr 8 ng/L     FLU/COVID Rapid Antigen (30 min. TAT) - Preferred screening test in ED [[id number]]  (Normal) Collected: 01/19/25 0347    Lab Status: Final result Specimen: Nares from Nose Updated: 01/19/25 0413     SARS COV Rapid Antigen Negative     Influenza A Rapid Antigen Negative     Influenza B Rapid Antigen Negative    Narrative:      This test has been performed using the ZEFR Heidy 2 FLU+SARS Antigen test under the Emergency Use Authorization (EUA). This test has been validated by the  and verified by the performing laboratory. The Heidy uses lateral flow immunofluorescent sandwich assay to detect SARS-COV, Influenza A and Influenza B Antigen.     The Quidel Heidy 2 SARS Antigen test does not differentiate between SARS-CoV and SARS-CoV-2.     Negative results are presumptive and may be confirmed with a molecular assay, if necessary, for patient management. Negative results do not rule out SARS-CoV-2 or influenza infection and should not be used as the sole basis for treatment or patient management decisions. A negative test result may occur if the level of antigen in a sample is below the limit of detection of this test.     Positive results are indicative of the presence of viral antigens, but do not rule out bacterial infection or co-infection with other viruses.     All test results should be used as an adjunct to clinical observations and other information available to the provider.    FOR PEDIATRIC PATIENTS - copy/paste COVID Guidelines URL to browser: [email or web address]    B-Type Natriuretic Peptide(BNP) [[id number]]  (Abnormal) Collected: 01/19/25 0347    Lab Status: Final result Specimen: Blood from Arm, Left Updated: 01/19/25 0413      pg/mL     Comprehensive metabolic panel [[id number]]  (Abnormal) Collected: 01/19/25 0347    Lab Status: Final result Specimen:  Blood from Arm, Left Updated: 01/19/25 0407     Sodium 126 mmol/L      Potassium 4.2 mmol/L      Chloride 85 mmol/L      CO2 33 mmol/L      ANION GAP 8 mmol/L      BUN 24 mg/dL      Creatinine 1.36 mg/dL      Glucose 120 mg/dL      Calcium 8.8 mg/dL      AST 14 U/L      ALT 9 U/L      Alkaline Phosphatase 98 U/L      Total Protein 6.8 g/dL      Albumin 3.6 g/dL      Total Bilirubin 0.44 mg/dL      eGFR 36 ml/min/1.73sq m     Narrative:      National Kid[employer] Disease Foundation guidelines for Chronic Kidney Disease (CKD):     Stage 1 with normal or high GFR (GFR > 90 mL/min/1.73 square meters)    Stage 2 Mild CKD (GFR = 60-89 mL/min/1.73 square meters)    Stage 3A Moderate CKD (GFR = 45-59 mL/min/1.73 square meters)    Stage 3B Moderate CKD (GFR = 30-44 mL/min/1.73 square meters)    Stage 4 Severe CKD (GFR = 15-29 mL/min/1.73 square meters)    Stage 5 End Stage CKD (GFR <15 mL/min/1.73 square meters)  Note: GFR calculation is accurate only with a steady state creatinine    CBC and differential [[id number]]  (Abnormal) Collected: 01/19/25 0347    Lab Status: Final result Specimen: Blood from Arm, Left Updated: 01/19/25 0353     WBC 6.92 Thousand/uL      RBC 3.20 Million/uL      Hemoglobin 8.7 g/dL      Hematocrit 28.6 %      MCV 89 fL      MCH 27.2 pg      MCHC 30.4 g/dL      RDW 14.6 %      MPV 9.6 fL      Platelets 317 Thousands/uL      nRBC 0 /100 WBCs      Segmented % 60 %      Immature Grans % 0 %      Lymphocytes % 26 %      Monocytes % 9 %      Eosinophils Relative 5 %      Basophils Relative 0 %      Absolute Neutrophils 4.19 Thousands/µL      Absolute Immature Grans 0.02 Thousand/uL      Absolute Lymphocytes 1.77 Thousands/µL      Absolute Monocytes 0.60 Thousand/µL      Eosinophils Absolute 0.32 Thousand/µL      Basophils Absolute 0.02 Thousands/µL     Blood gas, venous [[id number]]  (Abnormal) Collected: 01/19/25 0347    Lab Status: Final result Specimen: Blood from Arm, Left Updated: 01/19/25 0353     pH, Jonah  7.394     pCO2, Jonah 49.8 mm Hg      pO2, Jonah 27.3 mm Hg      HCO3, Jonah 29.7 mmol/L      Base Excess, Jonah 4.2 mmol/L      O2 Content, Jonah 6.4 ml/dL      O2 HGB, VENOUS 48.6 %              XR chest 1 view portable   ED Interpretation by Bill Lafleur DO (01/19 0411)   Wet read - no edema or infiltrate or effusion - similar to prior 11/24           ED Course     ED Course as of 01/19/25 0920   Sun Jan 19, 2025   0400 SpO2(!): 84 %  Suspect error. Resolved without intervention by changing pulse ox probe location.     Procedure Note: EKG  Date/Time: 01/19/25 0340 AM  Interpreted by: Marlon Aguilar   Indications / Diagnosis: SOB  ECG reviewed by me, the ED Provider: yes   The EKG demonstrates:  Rhythm: normal sinus  Intervals: normal intervals  Axis: normal axis  QRS/Blocks: wide QRS  ST Changes: No acute ST Changes, no STD/ELLYN.    Procedures   POC Cardiac US    Date/Time: 1/19/2025 3:30 AM    Performed by: Marlon Aguilar DO  Authorized by: Marlon Aguilar DO    Patient location:  ED  Other Assisting Provider: Yes (comment) (Dr. Lafleur)    Procedure details:     Exam Type:  Diagnostic    Indications: dyspnea      Assessment / Evaluation for: cardiac function, pericardial effusion and right heart strain (suspected pulmonary embolism)      Exam Type: initial exam      Image quality: diagnostic      Image availability:  Images available in PACS  Patient Details:     Cardiac Rhythm:  Regular    Mechanical ventilation: No    Cardiac findings:     Echo technique: limited 2D      Views obtained: parasternal long axis and parasternal short axis      Pericardial effusion: absent      Tamponade physiology: absent      Wall motion: hypodynamic      LV systolic function: depressed      RV dilation: none    Pulmonary findings:     Left Lung Findings: left lung sliding      Right lung findings: right lung sliding      B-lines: 1 to 3

## 2025-01-19 NOTE — ASSESSMENT & PLAN NOTE
Lab Results   Component Value Date    EGFR 36 01/19/2025    EGFR 31 12/02/2024    EGFR 30 11/30/2024    CREATININE 1.36 (H) 01/19/2025    CREATININE 1.54 (H) 12/02/2024    CREATININE 1.56 (H) 11/30/2024     Stage IIIb chronic kidney disease.  Overall renal function stable.  Has chronic urinary retention.  Continue monitoring urine output and renal function in AM.

## 2025-01-19 NOTE — ED PROVIDER NOTES
Time reflects when diagnosis was documented in both MDM as applicable and the Disposition within this note       Time User Action Codes Description Comment    1/19/2025  4:13 AM Bill Lafleur [I50.32] Chronic diastolic congestive heart failure (HCC)     1/19/2025  4:13 AM Bill Lafleur [J44.9] Chronic obstructive pulmonary disease, unspecified COPD type (HCC)     1/19/2025  4:13 AM Bill Lafleur [I50.32] Chronic heart failure with preserved ejection fraction (HFpEF) ([facility])     1/19/2025  4:14 AM Bill Lafleur [E11.9] Type 2 diabetes mellitus ([facility])     1/19/2025  4:14 AM Bill Lafleur [I10] Primary hypertension     1/19/2025  4:14 AM Bill Lafleur [E87.1] Hyponatremia     1/19/2025  4:16 AM Bill Lafleur [I50.9] CHF exacerbation (HCC)           ED Disposition       ED Disposition   Admit    Condition   Stable    Date/Time   Sun Jan 19, 2025  4:16 AM    Comment   --             Assessment & Plan       Medical Decision Making  Patient is an 79-year-old female presenting for evaluation of shortness of breath bilateral lower extremity swelling    Differential: CHF exacerbation versus COPD exacerbation versus viral illness.  Doubt ACS anemia or arrhythmia    Plan: Cardiac labs EKG chest x-ray symptomatic treatment.  Monitor and reassess.  Final dispo pending anticipate admission    See ED course    Patient HD stable at time of admission    Amount and/or Complexity of Data Reviewed  Labs: ordered. Decision-making details documented in ED Course.  Radiology: ordered and independent interpretation performed.  ECG/medicine tests:  Decision-making details documented in ED Course.    Risk  Prescription drug management.  Decision regarding hospitalization.        ED Course as of 01/19/25 0436   Sun Jan 19, 2025   0338 Pt initially hypoxic 80% in RA, up to 100% on 6L NC   0339 Blood Pressure: 158/65   0339 Temperature: 97.8 °F (36.6 °C)   0339 Pulse: 86   0348 Pt hypoxia resolved  off supplemental o2 - this hypoxic reading was likely poor reading. Will continue to monitor and reassess   0411 ECG 12 lead  Normal sinus rhythm 84 nonspecific interventricular conduction delay QTc 522 no ischemic changes no ST elevations or depressions no change from prior   0412 Sodium(!): 126  Overdiuresis versus poor p.o. intake?   0413 pCO2, Jonah: 49.8   0414 BNP(!): 222  Similar to prior when she was admitted in November for CHF exacerbation   0414 hs TnI 0hr: 8   0436 D/w [facility] PA who accepts pt for obs admission        Medications   ondansetron (ZOFRAN) injection 4 mg (4 mg Intravenous Given 1/19/25 0349)   albuterol inhalation solution 5 mg (5 mg Nebulization Given 1/19/25 0400)   ipratropium (ATROVENT) 0.02 % inhalation solution 0.5 mg (0.5 mg Nebulization Given 1/19/25 0400)   furosemide (LASIX) injection 40 mg (40 mg Intravenous Given 1/19/25 0404)       ED Risk Strat Scores                                              History of Present Illness       Chief Complaint   Patient presents with    Leg Swelling     Pt arrives via ems from [facility] for increased B/L leg swelling, decreased urinary output , and a cough        Past Medical History:   Diagnosis Date    Acid reflux     Acute kidney injury (HCC) 06/18/2022    Anemia     hx of iron-deficient    Anxiety     Arthritis     Asthma     last needed inhaler last year 2020    Basal cell carcinoma     upper lip    Chronic narcotic dependence (HCC)     Chronic pain     Colitis 11/19/2024    Colon polyp     Cystocele     Diabetes mellitus (HCC)     stable    Disease of thyroid gland     hypothyroidism    Diverticulosis     Dizziness     at times    Dysfunctional uterine bleeding     last assessed - [id number]    Dysphagia     Fibromyalgia     Gastric ulcer     Gastroparesis     History of colonic polyps     last assessed - 09Feb2016    History of gastroesophageal reflux (GERD)     Hypercholesterolemia     Hyperlipidemia     Hypertension      Hyponatremia 01/13/2024    IBS (irritable bowel syndrome)     Pneumobilia 06/18/2022    Post laminectomy syndrome     S/P insertion of spinal cord stimulator 07/18/2018    s/p [employer] loop recorder 3/2/2024 03/02/2024    Seasonal allergies     Shortness of breath     exertional    Spinal stenosis     Status post lumbar spinal fusion 03/16/2018    Stroke (HCC)     pt states slight stroke March 2022      Past Surgical History:   Procedure Laterality Date    APPENDECTOMY      BACK SURGERY      BREAST CYST EXCISION Left     CARDIAC CATHETERIZATION  11/14/2023    Procedure: Cardiac catheterization;  Surgeon: Randolph Holt MD;  [facility];  Service: Cardiology    CARDIAC CATHETERIZATION N/A 11/14/2023    Procedure: Cardiac Coronary Angiogram;  Surgeon: Randolph Holt MD;  [facility];  Service: Cardiology    CARDIAC ELECTROPHYSIOLOGY PROCEDURE N/A 3/2/2024    Procedure: Cardiac loop recorder implant;  Surgeon: Edu Fontaine MD;  [facility];  Service: Cardiology    CHOLECYSTECTOMY      COLONOSCOPY      ESOPHAGOGASTRODUODENOSCOPY N/A 09/28/2016    Procedure: ESOPHAGOGASTRODUODENOSCOPY (EGD);  Surgeon: Mylene Moeller MD;  Location: [facility];  Service:     HERNIA REPAIR      HYSTERECTOMY      TTAH-BSO age 28    LAMINECTOMY      LUMBAR LAMINECTOMY      OOPHORECTOMY      age 28    RI ARTHRODESIS POSTERIOR/PSTLAT TQ 1NTRSPC THORACIC N/A 06/04/2018    Procedure: Reopening of lumbar incision for T12-L5 posterior instrumented fixation and fusion and T12-L4 posterior decompression;  Surgeon: Wood Rogel MD;  Location: [facility];  Service: Neurosurgery    RI COLONOSCOPY FLX DX W/COLLJ SPEC WHEN PFRMD N/A 03/02/2016    Procedure: EGD AND COLONOSCOPY;  Surgeon: Mylene Moeller MD;  Location: [facility];  Service: Gastroenterology    RI DILATION ESOPH UNGUIDED SOUND/BOUGIE 1/MULT PASS N/A 09/28/2016    Procedure: DILATATION ESOPHAGEAL;  Surgeon: Mylene Moeller MD;   [facility]: [facility];  Service: Gastroenterology    MD ESOPHAGOGASTRODUODENOSCOPY TRANSORAL DIAGNOSTIC N/A 2016    Procedure: ESOPHAGOGASTRODUODENOSCOPY (EGD);  Surgeon: Mylene Moeller MD;  [facility]: [facility];  Service: Gastroenterology    MD INSJ/RPLCMT SPINAL NPG/RCVR POCKET CRTJ&CONNJ Left 2018    Procedure: removal of left buttock implantable pulse generator and placement of new  implantable pulse generator;  Surgeon: Wood Rogel MD;  Location: [facility];  Service: Neurosurgery      Family History   Problem Relation Age of Onset    Lung cancer Mother 46    Pulmonary embolism Father     No Known Problems Sister     No Known Problems Daughter     No Known Problems Daughter     Stroke Maternal Grandmother     Heart attack Maternal Grandfather     No Known Problems Paternal Grandmother     No Known Problems Paternal Grandfather     No Known Problems Maternal Aunt     Diabetes Family         Diabetes mellitus    Hypertension Family     Stroke Family         Stroke complications      Social History     Tobacco Use    Smoking status: Former     Current packs/day: 0.00     Types: Cigarettes     Quit date: 1970     Years since quittin.0    Smokeless tobacco: Never    Tobacco comments:     Denied history of current ever day smoker, Former smoker and Never smoker all documented in [employer]   Vaping Use    Vaping status: Never Used   Substance Use Topics    Alcohol use: Not Currently     Comment: Denied history of alcohol use    Drug use: No     Comment: Denied history of drug use      E-Cigarette/Vaping    E-Cigarette Use Never User       E-Cigarette/Vaping Substances    Nicotine No     THC No     CBD No     Flavoring No     Other No     Unknown No       I have reviewed and agree with the history as documented.     Patient is an 79-year-old female presenting for evaluation of shortness of breath.  Coming from nursing facility states that she has had worsening shortness of breath with cough and  wheezing for the past several days.  Also notes that her bilateral lower extremities have been much more swollen than usual.  She is denying any fever chills productive cough chest pain palpitations abdominal pain nausea vomiting or any other complaints at this time.  Per chart review patient was recently admitted 3 months ago for CHF exacerbation          Review of Systems   Constitutional:  Negative for chills and fever.   HENT:  Negative for ear pain and sore throat.    Eyes:  Negative for pain and visual disturbance.   Respiratory:  Positive for cough and shortness of breath.    Cardiovascular:  Positive for leg swelling. Negative for chest pain and palpitations.   Gastrointestinal:  Negative for abdominal pain, nausea and vomiting.   Genitourinary:  Negative for dysuria and hematuria.   Musculoskeletal:  Negative for arthralgias and back pain.   Skin:  Negative for color change and rash.   Neurological:  Negative for seizures and syncope.   All other systems reviewed and are negative.          Objective       ED Triage Vitals [01/19/25 0324]   Temperature Pulse Blood Pressure Respirations SpO2 Patient Position - Orthostatic VS   97.8 °F (36.6 °C) 86 158/65 19 (!) 84 % Lying      Temp src Heart Rate Source BP Location FiO2 (%) Pain Score    -- -- Right arm -- --      Vitals      Date and Time Temp Pulse SpO2 Resp BP Pain Score FACES Pain Rating User   01/19/25 0357 -- 80 95 % 19 115/55 -- -- BA   01/19/25 0336 -- -- 96 % -- -- -- --    01/19/25 0324 97.8 °F (36.6 °C) 86 84 % 19 158/65 -- --             Physical Exam  Vitals and nursing note reviewed.   Constitutional:       General: She is not in acute distress.     Appearance: She is well-developed.   HENT:      Head: Normocephalic and atraumatic.      Mouth/Throat:      Mouth: Mucous membranes are moist.   Eyes:      Extraocular Movements: Extraocular movements intact.      Conjunctiva/sclera: Conjunctivae normal.   Cardiovascular:      Rate and Rhythm:  Normal rate and regular rhythm.      Heart sounds: No murmur heard.  Pulmonary:      Effort: Pulmonary effort is normal. No respiratory distress.      Breath sounds: Decreased breath sounds (Throughout, worse in the bases) and wheezing (Scant expiratory) present. No rhonchi or rales.   Abdominal:      Palpations: Abdomen is soft.      Tenderness: There is no abdominal tenderness.   Musculoskeletal:         General: No swelling.      Cervical back: Neck supple.      Right lower le+ Pitting Edema present.      Left lower le+ Pitting Edema present.   Skin:     General: Skin is warm and dry.      Capillary Refill: Capillary refill takes less than 2 seconds.   Neurological:      Mental Status: She is alert.   Psychiatric:         Mood and Affect: Mood normal.         Results Reviewed       Procedure Component Value Units Date/Time    HS Troponin 0hr (reflex protocol) [[id number]]  (Normal) Collected: 25    Lab Status: Final result Specimen: Blood from Arm, Left Updated: 25 0414     hs TnI 0hr 8 ng/L     HS Troponin I 2hr [[id number]]     Lab Status: No result Specimen: Blood     HS Troponin I 4hr [[id number]]     Lab Status: No result Specimen: Blood     FLU/COVID Rapid Antigen (30 min. TAT) - Preferred screening test in ED [[id number]]  (Normal) Collected: 25    Lab Status: Final result Specimen: Nares from Nose Updated: 25 0413     SARS COV Rapid Antigen Negative     Influenza A Rapid Antigen Negative     Influenza B Rapid Antigen Negative    Narrative:      This test has been performed using the Quidel Heidy 2 FLU+SARS Antigen test under the Emergency Use Authorization (EUA). This test has been validated by the  and verified by the performing laboratory. The Heidy uses lateral flow immunofluorescent sandwich assay to detect SARS-COV, Influenza A and Influenza B Antigen.     The Quidel Heidy 2 SARS Antigen test does not differentiate between SARS-CoV and SARS-CoV-2.      Negative results are presumptive and may be confirmed with a molecular assay, if necessary, for patient management. Negative results do not rule out SARS-CoV-2 or influenza infection and should not be used as the sole basis for treatment or patient management decisions. A negative test result may occur if the level of antigen in a sample is below the limit of detection of this test.     Positive results are indicative of the presence of viral antigens, but do not rule out bacterial infection or co-infection with other viruses.     All test results should be used as an adjunct to clinical observations and other information available to the provider.    FOR PEDIATRIC PATIENTS - copy/paste COVID Guidelines URL to browser: [email or web address]    B-Type Natriuretic Peptide(BNP) [[id number]]  (Abnormal) Collected: 01/19/25 0347    Lab Status: Final result Specimen: Blood from Arm, Left Updated: 01/19/25 0413      pg/mL     Comprehensive metabolic panel [[id number]]  (Abnormal) Collected: 01/19/25 0347    Lab Status: Final result Specimen: Blood from Arm, Left Updated: 01/19/25 0407     Sodium 126 mmol/L      Potassium 4.2 mmol/L      Chloride 85 mmol/L      CO2 33 mmol/L      ANION GAP 8 mmol/L      BUN 24 mg/dL      Creatinine 1.36 mg/dL      Glucose 120 mg/dL      Calcium 8.8 mg/dL      AST 14 U/L      ALT 9 U/L      Alkaline Phosphatase 98 U/L      Total Protein 6.8 g/dL      Albumin 3.6 g/dL      Total Bilirubin 0.44 mg/dL      eGFR 36 ml/min/1.73sq m     Narrative:      National Kidney Disease Foundation guidelines for Chronic Kidney Disease (CKD):     Stage 1 with normal or high GFR (GFR > 90 mL/min/1.73 square meters)    Stage 2 Mild CKD (GFR = 60-89 mL/min/1.73 square meters)    Stage 3A Moderate CKD (GFR = 45-59 mL/min/1.73 square meters)    Stage 3B Moderate CKD (GFR = 30-44 mL/min/1.73 square meters)    Stage 4 Severe CKD (GFR = 15-29 mL/min/1.73  square meters)    Stage 5 End Stage CKD (GFR <15 mL/min/1.73 square meters)  Note: GFR calculation is accurate only with a steady state creatinine    CBC and differential [[id number]]  (Abnormal) Collected: 01/19/25 0347    Lab Status: Final result Specimen: Blood from Arm, Left Updated: 01/19/25 0353     WBC 6.92 Thousand/uL      RBC 3.20 Million/uL      Hemoglobin 8.7 g/dL      Hematocrit 28.6 %      MCV 89 fL      MCH 27.2 pg      MCHC 30.4 g/dL      RDW 14.6 %      MPV 9.6 fL      Platelets 317 Thousands/uL      nRBC 0 /100 WBCs      Segmented % 60 %      Immature Grans % 0 %      Lymphocytes % 26 %      Monocytes % 9 %      Eosinophils Relative 5 %      Basophils Relative 0 %      Absolute Neutrophils 4.19 Thousands/µL      Absolute Immature Grans 0.02 Thousand/uL      Absolute Lymphocytes 1.77 Thousands/µL      Absolute Monocytes 0.60 Thousand/µL      Eosinophils Absolute 0.32 Thousand/µL      Basophils Absolute 0.02 Thousands/µL     Blood gas, venous [[id number]]  (Abnormal) Collected: 01/19/25 0347    Lab Status: Final result Specimen: Blood from Arm, Left Updated: 01/19/25 0353     pH, Jonah 7.394     pCO2, Jonah 49.8 mm Hg      pO2, Jonah 27.3 mm Hg      HCO3, Jonah 29.7 mmol/L      Base Excess, Jonah 4.2 mmol/L      O2 Content, Jonah 6.4 ml/dL      O2 HGB, VENOUS 48.6 %             XR chest 1 view portable   ED Interpretation by Bill Lafleur DO (01/19 0411)   Wet read - no edema or infiltrate or effusion - similar to prior 11/24          Procedures    ED Medication and Procedure Management   Prior to Admission Medications   Prescriptions Last Dose Informant Patient Reported? Taking?   Blood Glucose Monitoring Suppl (OneTouch Verio Reflect) w/Device KIT  Self No No   Sig: Check blood sugars twice daily. Please substitute with appropriate alternative as covered by patient's insurance. Dx: E11.65   HYDROmorphone (DILAUDID) 2 mg tablet Not Taking  No No   Sig: Take 1 tablet (2 mg total) by mouth every 6 (six) hours as  needed for moderate pain or severe pain Max Daily Amount: 8 mg   Patient not taking: Reported on 1/19/2025   Lidocaine 4 % PTCH   Yes Yes   Sig: Apply 4 % topically Apply 1 every morning to back and remove at bedtime   MAGNESIUM OXIDE 400 PO  Self Yes Yes   Sig: Take 400 mg by mouth 2 (two) times a day   Milnacipran HCl (Savella) 100 MG TABS  Self No No   Sig: Take 1 tablet (100 mg total) by mouth daily   OneTouch Delica Lancets 33G MISC  Self No No   Sig: Check blood sugars twice daily. Please substitute with appropriate alternative as covered by patient's insurance. Dx: E11.65   acetaminophen (TYLENOL) 325 mg tablet   No No   Sig: Take 3 tablets (975 mg total) by mouth every 8 (eight) hours   albuterol (PROVENTIL HFA,VENTOLIN HFA) 90 mcg/act inhaler  Self No Yes   Sig: Inhale 2 puffs every 6 (six) hours as needed for wheezing or shortness of breath   aspirin 81 mg chewable tablet  Self Yes Yes   Sig: Chew 81 mg daily   atorvastatin (LIPITOR) 40 mg tablet  Self No No   Sig: TAKE ONE TABLET BY MOUTH ONCE DAILY   Patient taking differently: 40 mg One tablet by mouth every evening with dinner   baclofen 10 mg tablet   No No   Sig: Take 1 tablet (10 mg total) by mouth 2 (two) times a day as needed for muscle spasms   docusate sodium (COLACE) 100 mg capsule  Self No No   Sig: Take 1 capsule (100 mg total) by mouth 2 (two) times a day   ergocalciferol (VITAMIN D2) 50,000 units   Yes Yes   Sig: Take 50,000 Units by mouth Take I cap orally once monthly   famotidine (PEPCID) 20 mg tablet   Yes Yes   Sig: Take 20 mg by mouth 2 (two) times a day 1 tab orally twice daily as needed for heartburn/acid reflux   fentaNYL (DURAGESIC) 50 mcg/hr   Yes Yes   Sig: Place 1 patch on the skin every third day Apply 1 patch every 3 days   ferrous sulfate 325 (65 Fe) mg tablet  Self No Yes   Sig: Take 1 tablet (325 mg total) by mouth daily with breakfast   gabapentin (NEURONTIN) 100 mg capsule   No Yes   Sig: Take 1 capsule (100 mg total) by  mouth daily at bedtime   Patient taking differently: Take 300 mg by mouth daily at bedtime 1 cap at bedtime   glucose blood (OneTouch Verio) test strip  Self No No   Sig: Check blood sugars twice daily. Please substitute with appropriate alternative as covered by patient's insurance. Dx: E11.65   latanoprost (XALATAN) 0.005 % ophthalmic solution  Self Yes Yes   Sig: Administer 1 drop to both eyes daily at bedtime   levothyroxine 75 mcg tablet  Self No Yes   Sig: Take 0.5 tablets (37.5 mcg total) by mouth daily in the early morning   lidocaine (LIDODERM) 5 %   No No   Sig: Apply 1 patch topically over 12 hours daily Remove & Discard patch within 12 hours or as directed by MD   meclizine (ANTIVERT) 25 mg tablet  Self, Outside Facility (Specify) No Yes   Sig: Take 1 tablet (25 mg total) by mouth 4 (four) times a day as needed for dizziness   Patient taking differently: Take 25 mg by mouth daily as needed for dizziness 1 tab daily as needed for dizziness   melatonin 3 mg   Yes Yes   Sig: Take 3 mg by mouth daily at bedtime 1 tab orally at bedtime   metFORMIN (GLUCOPHAGE) 500 mg tablet   Yes Yes   Sig: Take 500 mg by mouth 2 (two) times a day with meals 1 tablet PO twice daily with meals   methocarbamol (ROBAXIN) 750 mg tablet   Yes Yes   Sig: Take 750 mg by mouth every 8 (eight) hours 1 tab orally every 8 hours as needed for muscle spasms   metoprolol succinate (TOPROL-XL) 25 mg 24 hr tablet   No Yes   Sig: Take 0.5 tablets (12.5 mg total) by mouth daily   morphine (MSIR) 15 mg tablet   Yes Yes   Sig: Take 15 mg by mouth every 6 (six) hours as needed for severe pain 1 tablet ever 6 hours for pain   pantoprazole (PROTONIX) 40 mg tablet  Self No No   Sig: Take 1 tablet (40 mg total) by mouth daily   polyethylene glycol (MIRALAX) 17 g packet  Self No No   Sig: Take 17 g by mouth daily   senna (SENOKOT) 8.6 mg  Self, Outside Facility (Specify) No Yes   Sig: Take 2 tablets (17.2 mg total) by mouth daily at bedtime   Patient  taking differently: Take 17.2 mg by mouth daily as needed for constipation   simethicone (MYLICON) 80 mg chewable tablet   Yes Yes   Sig: Chew 80 mg every 6 (six) hours as needed for flatulence Chew and swallow 1 tab orally 3 times daily as needed for gas   sucralfate (CARAFATE) 1 g tablet   No No   Sig: Take 1 tablet (1 g total) by mouth 2 (two) times a day before meals for 7 days   torsemide (DEMADEX) 10 mg tablet   No No   Sig: Take 3 tablets (30 mg total) by mouth 2 (two) times a day   Patient taking differently: Take 20 mg by mouth 2 (two) times a day Take 1.5 tablet for a total of 30 mg every morning for edema   torsemide (DEMADEX) 20 mg tablet   Yes Yes   Sig: Take 20 mg by mouth daily 1 tablet orally daily as needed for leg swelling. Do not take additional doses more then 5 days in a row unless expressly directed by provider      Facility-Administered Medications: None     Patient's Medications   Discharge Prescriptions    No medications on file     No discharge procedures on file.  ED SEPSIS DOCUMENTATION   Time reflects when diagnosis was documented in both MDM as applicable and the Disposition within this note       Time User Action Codes Description Comment    1/19/2025  4:13 AM Bill Lafleur [I50.32] Chronic diastolic congestive heart failure (HCC)     1/19/2025  4:13 AM Bill Lafleur [J44.9] Chronic obstructive pulmonary disease, unspecified COPD type ([facility])     1/19/2025  4:13 AM Bill Lafleur [I50.32] Chronic heart failure with preserved ejection fraction (HFpEF) ([facility])     1/19/2025  4:14 AM Bill Lafleur [E11.9] Type 2 diabetes mellitus ([facility])     1/19/2025  4:14 AM Bill Lafleur [I10] Primary hypertension     1/19/2025  4:14 AM Bill Lafleur [E87.1] Hyponatremia     1/19/2025  4:16 AM Bill Lafleur [I50.9] CHF exacerbation ([facility])                  Bill Lafleur DO  01/20/25 0333

## 2025-01-19 NOTE — H&P
"H&P - Hospitalist   Name: Mattie Varela 79 y.o. female I MRN: [id number]  Unit/Bed#: ED-06 I Date of Admission: 1/19/2025   Date of Service: 1/19/2025 I Hospital Day: 0     Assessment & Plan  Acute on chronic diastolic congestive heart failure (HCC)  Wt Readings from Last 3 Encounters:   01/19/25 85.2 kg (187 lb 13.3 oz)   12/20/24 81.2 kg (179 lb)   12/02/24 81.3 kg (179 lb 3.7 oz)     With increasing weight, dyspnea, lower extremity edema. CXR reviewed no acute cardiopulmonary disease  TTE 11/2024 reviewed LVEF 51%, unable to assess diastolic function  Home diuretic: torsemide 30mg daily     Plan:  Diuresis: IV lasix 50mg twice daily   GDMT: toprol XL 12.5mg daily  Daily weights, I&O, monitor and replenish electrolytes   Type 2 diabetes mellitus with other skin complications (HCC)  Lab Results   Component Value Date    HGBA1C 7.4 (H) 10/12/2024   No results for input(s): \"POCGLU\" in the last 72 hours.  Blood Sugar Average: Last 72 hrs:    A1c near goal  Outpatient regimen: metformin 500mg twice daily     Plan:  BG goal 140-200 while inpatient  SSI  Holding oral antihyperglycemics  Anemia in stage 3b chronic kidney disease  (HCC)  No overt bleeding  Baseline hgb 9-10    Plan:  Trend CBC  Stage 3b chronic kidney disease (HCC)  Lab Results   Component Value Date    EGFR 36 01/19/2025    EGFR 31 12/02/2024    EGFR 30 11/30/2024    CREATININE 1.36 (H) 01/19/2025    CREATININE 1.54 (H) 12/02/2024    CREATININE 1.56 (H) 11/30/2024     CKD in setting of hypertensive, diabetic nephropathy   Baseline Cr: 1.3-1.5  Admit Cr: 1.36     Plan:  Monitor renal function, renal dose medications, maintain normotension/euvolemia, avoid nephrotoxic agents, I&Os  Hyponatremia  Asymptomatic suspect secondary from volume overload    Plan:  Monitor BMP with diuresis    VTE Pharmacologic Prophylaxis: VTE Score: 4 Moderate Risk (Score 3-4) - Pharmacological DVT Prophylaxis Ordered: heparin.  Code Status: Prior   Discussion with family: "   Anticipated Length of Stay: Patient will be admitted on an observation basis with an anticipated length of stay of less than 2 midnights secondary to chf.    History of Present Illness   Chief Complaint:   Chief Complaint   Patient presents with    Leg Swelling     Pt arrives via ems from [facility] for increased B/L leg swelling, decreased urinary output , and a cough      Mattie CARLEY Varela is a 79 y.o. female with a PMH of CVA, HTN, HFpEF, CAD, CKD, T2DM who presents with leg swelling.   History obtained from patient, chart review and discussion with ED resident. She presents tonight for evaluation of leg swelling, shortness of breath and cough. This has worsened over last few days. Has been gaining weight as well with her leg swelling. Reports compliance with medications as prescribed. Denies fevers or known sick contacts. No chest pain or palpitations.     Review of Systems   Constitutional:  Negative for chills and fever.   Respiratory:  Positive for cough and shortness of breath.    Cardiovascular:  Negative for chest pain, palpitations and leg swelling.   Gastrointestinal:  Negative for abdominal pain, diarrhea, nausea and vomiting.   Neurological:  Negative for syncope.   All other systems reviewed and are negative.      Historical Information   Past Medical History:   Diagnosis Date    Acid reflux     Acute kidney injury (HCC) 06/18/2022    Anemia     hx of iron-deficient    Anxiety     Arthritis     Asthma     last needed inhaler last year 2020    Basal cell carcinoma     upper lip    Chronic narcotic dependence (HCC)     Chronic pain     Colitis 11/19/2024    Colon polyp     Cystocele     Diabetes mellitus (HCC)     stable    Disease of thyroid gland     hypothyroidism    Diverticulosis     Dizziness     at times    Dysfunctional uterine bleeding     last assessed - [id number]    Dysphagia     Fibromyalgia     Gastric ulcer     Gastroparesis     History of colonic polyps     last assessed - [id number]     History of gastroesophageal reflux (GERD)     Hypercholesterolemia     Hyperlipidemia     Hypertension     Hyponatremia 01/13/2024    IBS (irritable bowel syndrome)     Pneumobilia 06/18/2022    Post laminectomy syndrome     S/P insertion of spinal cord stimulator 07/18/2018    s/p [employer] loop recorder 3/2/2024 03/02/2024    Seasonal allergies     Shortness of breath     exertional    Spinal stenosis     Status post lumbar spinal fusion 03/16/2018    Stroke (HCC)     pt states slight stroke March 2022     Past Surgical History:   Procedure Laterality Date    APPENDECTOMY      BACK SURGERY      BREAST CYST EXCISION Left     CARDIAC CATHETERIZATION  11/14/2023    Procedure: Cardiac catheterization;  Surgeon: Randolph Holt MD;  [facility];  Service: Cardiology    CARDIAC CATHETERIZATION N/A 11/14/2023    Procedure: Cardiac Coronary Angiogram;  Surgeon: Randolph Holt MD;  [facility];  Service: Cardiology    CARDIAC ELECTROPHYSIOLOGY PROCEDURE N/A 3/2/2024    Procedure: Cardiac loop recorder implant;  Surgeon: Edu Fontaine MD;  [facility];  Service: Cardiology    CHOLECYSTECTOMY      COLONOSCOPY      ESOPHAGOGASTRODUODENOSCOPY N/A 09/28/2016    Procedure: ESOPHAGOGASTRODUODENOSCOPY (EGD);  Surgeon: Mylene Moeller MD;  Location: [facility];  Service:     HERNIA REPAIR      HYSTERECTOMY      TTAH-BSO age 28    LAMINECTOMY      LUMBAR LAMINECTOMY      OOPHORECTOMY      age 28    MD ARTHRODESIS POSTERIOR/PSTLAT TQ 1NTRSPC THORACIC N/A 06/04/2018    Procedure: Reopening of lumbar incision for T12-L5 posterior instrumented fixation and fusion and T12-L4 posterior decompression;  Surgeon: Wood Rogel MD;  Location: [facility];  Service: Neurosurgery    MD COLONOSCOPY FLX DX W/COLLJ SPEC WHEN PFRMD N/A 03/02/2016    Procedure: EGD AND COLONOSCOPY;  Surgeon: Mylene Moeller MD;  [facility];  Service: Gastroenterology    MD DILATION ESOPH UNGUIDED  SOUND/BOUGIE 1/MULT PASS N/A 2016    Procedure: DILATATION ESOPHAGEAL;  Surgeon: Mylene Moeller MD;  [facility];  Service: Gastroenterology    IL ESOPHAGOGASTRODUODENOSCOPY TRANSORAL DIAGNOSTIC N/A 2016    Procedure: ESOPHAGOGASTRODUODENOSCOPY (EGD);  Surgeon: Mylene Moeller MD;  [facility];  Service: Gastroenterology    IL INSJ/RPLCMT SPINAL NPG/RCVR POCKET CRTJ&CONNJ Left 2018    Procedure: removal of left buttock implantable pulse generator and placement of new  implantable pulse generator;  Surgeon: Wood Rogel MD;  [facility];  Service: Neurosurgery     Social History     Tobacco Use    Smoking status: Former     Current packs/day: 0.00     Types: Cigarettes     Quit date: 1970     Years since quittin.0    Smokeless tobacco: Never    Tobacco comments:     Denied history of current ever day smoker, Former smoker and Never smoker all documented in [employer]   Vaping Use    Vaping status: Never Used   Substance and Sexual Activity    Alcohol use: Not Currently     Comment: Denied history of alcohol use    Drug use: No     Comment: Denied history of drug use    Sexual activity: Not Currently     E-Cigarette/Vaping    E-Cigarette Use Never User      E-Cigarette/Vaping Substances    Nicotine No     THC No     CBD No     Flavoring No     Other No     Unknown No      Family History   Problem Relation Age of Onset    Lung cancer Mother 46    Pulmonary embolism Father     No Known Problems Sister     No Known Problems Daughter     No Known Problems Daughter     Stroke Maternal Grandmother     Heart attack Maternal Grandfather     No Known Problems Paternal Grandmother     No Known Problems Paternal Grandfather     No Known Problems Maternal Aunt     Diabetes Family         Diabetes mellitus    Hypertension Family     Stroke Family         Stroke complications     Social History:  Marital Status:    Occupation:   Patient Pre-hospital Living Situation:  Home  Patient Pre-hospital Level of Mobility: walks  Patient Pre-hospital Diet Restrictions:     Meds/Allergies   I have reviewed home medications with patient personally.  Prior to Admission medications    Medication Sig Start Date End Date Taking? Authorizing Provider   albuterol (PROVENTIL HFA,VENTOLIN HFA) 90 mcg/act inhaler Inhale 2 puffs every 6 (six) hours as needed for wheezing or shortness of breath 6/29/22  Yes NANY Pollock   aspirin 81 mg chewable tablet Chew 81 mg daily   Yes Historical Provider, MD   ergocalciferol (VITAMIN D2) 50,000 units Take 50,000 Units by mouth Take I cap orally once monthly   Yes Historical Provider, MD   famotidine (PEPCID) 20 mg tablet Take 20 mg by mouth 2 (two) times a day 1 tab orally twice daily as needed for heartburn/acid reflux   Yes Historical Provider, MD   fentaNYL (DURAGESIC) 50 mcg/hr Place 1 patch on the skin every third day Apply 1 patch every 3 days   Yes Historical Provider, MD   ferrous sulfate 325 (65 Fe) mg tablet Take 1 tablet (325 mg total) by mouth daily with breakfast 1/10/24  Yes Paco Boyer MD   gabapentin (NEURONTIN) 100 mg capsule Take 1 capsule (100 mg total) by mouth daily at bedtime  Patient taking differently: Take 300 mg by mouth daily at bedtime 1 cap at bedtime 10/30/24  Yes May Alvarado DO   latanoprost (XALATAN) 0.005 % ophthalmic solution Administer 1 drop to both eyes daily at bedtime   Yes Historical Provider, MD   levothyroxine 75 mcg tablet Take 0.5 tablets (37.5 mcg total) by mouth daily in the early morning 1/17/24 1/19/25 Yes Alejandro Philip MD   Lidocaine 4 % PTCH Apply 4 % topically Apply 1 every morning to back and remove at bedtime   Yes Historical Provider, MD   MAGNESIUM OXIDE 400 PO Take 400 mg by mouth 2 (two) times a day   Yes Historical Provider, MD   meclizine (ANTIVERT) 25 mg tablet Take 1 tablet (25 mg total) by mouth 4 (four) times a day as needed for dizziness  Patient taking differently: Take 25  mg by mouth daily as needed for dizziness 1 tab daily as needed for dizziness 3/26/23 1/19/25 Yes [place] Baires DO   melatonin 3 mg Take 3 mg by mouth daily at bedtime 1 tab orally at bedtime   Yes Historical Provider, MD   metFORMIN (GLUCOPHAGE) 500 mg tablet Take 500 mg by mouth 2 (two) times a day with meals 1 tablet PO twice daily with meals   Yes Historical Provider, MD   methocarbamol (ROBAXIN) 750 mg tablet Take 750 mg by mouth every 8 (eight) hours 1 tab orally every 8 hours as needed for muscle spasms   Yes Historical Provider, MD   metoprolol succinate (TOPROL-XL) 25 mg 24 hr tablet Take 0.5 tablets (12.5 mg total) by mouth daily 12/3/24  Yes Alexys Angel DO   morphine (MSIR) 15 mg tablet Take 15 mg by mouth every 6 (six) hours as needed for severe pain 1 tablet ever 6 hours for pain   Yes Historical Provider, MD   senna (SENOKOT) 8.6 mg Take 2 tablets (17.2 mg total) by mouth daily at bedtime  Patient taking differently: Take 17.2 mg by mouth daily as needed for constipation 6/5/24  Yes NANY Ruby   simethicone (MYLICON) 80 mg chewable tablet Chew 80 mg every 6 (six) hours as needed for flatulence Chew and swallow 1 tab orally 3 times daily as needed for gas   Yes Historical Provider, MD   torsemide (DEMADEX) 20 mg tablet Take 20 mg by mouth daily 1 tablet orally daily as needed for leg swelling. Do not take additional doses more then 5 days in a row unless expressly directed by provider   Yes Historical Provider, MD   acetaminophen (TYLENOL) 325 mg tablet Take 3 tablets (975 mg total) by mouth every 8 (eight) hours 10/19/24   Paco Carmona MD   atorvastatin (LIPITOR) 40 mg tablet TAKE ONE TABLET BY MOUTH ONCE DAILY  Patient taking differently: 40 mg One tablet by mouth every evening with dinner 11/22/22   NANY Pollock   baclofen 10 mg tablet Take 1 tablet (10 mg total) by mouth 2 (two) times a day as needed for muscle spasms 12/2/24   Alexys Angel DO   Blood  Glucose Monitoring Suppl (OneTouch Verio Reflect) w/Device KIT Check blood sugars twice daily. Please substitute with appropriate alternative as covered by patient's insurance. Dx: E11.65 2/20/23   NANY Pollock   docusate sodium (COLACE) 100 mg capsule Take 1 capsule (100 mg total) by mouth 2 (two) times a day 6/5/24   NANY Ruby   glucose blood (OneTouch Verio) test strip Check blood sugars twice daily. Please substitute with appropriate alternative as covered by patient's insurance. Dx: E11.65 2/20/23   NANY Pollock   HYDROmorphone (DILAUDID) 2 mg tablet Take 1 tablet (2 mg total) by mouth every 6 (six) hours as needed for moderate pain or severe pain Max Daily Amount: 8 mg  Patient not taking: Reported on 1/19/2025 12/2/24   Alexys Angel, DO   lidocaine (LIDODERM) 5 % Apply 1 patch topically over 12 hours daily Remove & Discard patch within 12 hours or as directed by MD 10/20/24   Paco Carmona MD   Milnacipran HCl (Savella) 100 MG TABS Take 1 tablet (100 mg total) by mouth daily 3/5/23   Venice Baires, DO   OneTouch Delica Lancets 33G MISC Check blood sugars twice daily. Please substitute with appropriate alternative as covered by patient's insurance. Dx: E11.65 2/20/23   NANY Pollock   pantoprazole (PROTONIX) 40 mg tablet Take 1 tablet (40 mg total) by mouth daily 1/10/24 11/17/24  Paco Boyer MD   polyethylene glycol (MIRALAX) 17 g packet Take 17 g by mouth daily 6/5/24   NANY Ruby   sucralfate (CARAFATE) 1 g tablet Take 1 tablet (1 g total) by mouth 2 (two) times a day before meals for 7 days 10/19/24 10/26/24  Paco Carmona MD   torsemide (DEMADEX) 10 mg tablet Take 3 tablets (30 mg total) by mouth 2 (two) times a day  Patient taking differently: Take 20 mg by mouth 2 (two) times a day Take 1.5 tablet for a total of 30 mg every morning for edema 12/2/24   Alexys Angel, DO     Allergies   Allergen Reactions    Penicillins  Anaphylaxis, Hives and Other (See Comments)     Other reaction(s): Unknown Reaction    Sulfa Antibiotics Anaphylaxis and Other (See Comments)     Other reaction(s): Unknown Reaction    Aspartame - Food Allergy Other (See Comments) and Hypertension     Slurred speech, weakness, stroke sx    Iodinated Contrast Media Hives     Pt has taken prep prior for contrast and has not had any break through reaction    Keflex [Cephalexin] Hives       Objective :  Temp:  [97.8 °F (36.6 °C)] 97.8 °F (36.6 °C)  HR:  [80-86] 80  BP: (115-158)/(55-65) 115/55  Resp:  [19] 19  SpO2:  [84 %-96 %] 95 %  O2 Device: Nasal cannula  Nasal Cannula O2 Flow Rate (L/min):  [2 L/min-6 L/min] 2 L/min    Physical Exam  Vitals and nursing note reviewed.   Constitutional:       General: She is not in acute distress.     Appearance: She is well-developed.   HENT:      Head: Normocephalic and atraumatic.   Eyes:      Conjunctiva/sclera: Conjunctivae normal.   Cardiovascular:      Rate and Rhythm: Normal rate and regular rhythm.      Heart sounds: No murmur heard.  Pulmonary:      Effort: Pulmonary effort is normal. No respiratory distress.      Breath sounds: Normal breath sounds.      Comments: diminished  Abdominal:      Palpations: Abdomen is soft.      Tenderness: There is no abdominal tenderness.   Musculoskeletal:         General: No swelling.      Cervical back: Neck supple.      Right lower leg: Edema present.      Left lower leg: Edema present.   Skin:     General: Skin is warm and dry.      Capillary Refill: Capillary refill takes less than 2 seconds.   Neurological:      Mental Status: She is alert.   Psychiatric:         Mood and Affect: Mood normal.        Lines/Drains:    Urinary Catheter:  Goal for removal: N/A - Chronic [place]               Lab Results: I have reviewed the following results:  Results from last 7 days   Lab Units 01/19/25  0347   WBC Thousand/uL 6.92   HEMOGLOBIN g/dL 8.7*   HEMATOCRIT % 28.6*   PLATELETS Thousands/uL 317    SEGS PCT % 60   LYMPHO PCT % 26   MONO PCT % 9   EOS PCT % 5     Results from last 7 days   Lab Units 01/19/25  0347   SODIUM mmol/L 126*   POTASSIUM mmol/L 4.2   CHLORIDE mmol/L 85*   CO2 mmol/L 33*   BUN mg/dL 24   CREATININE mg/dL 1.36*   ANION GAP mmol/L 8   CALCIUM mg/dL 8.8   ALBUMIN g/dL 3.6   TOTAL BILIRUBIN mg/dL 0.44   ALK PHOS U/L 98   ALT U/L 9   AST U/L 14   GLUCOSE RANDOM mg/dL 120             Lab Results   Component Value Date    HGBA1C 7.4 (H) 10/12/2024    HGBA1C 6.5 (H) 11/26/2023    HGBA1C 6.6 (H) 06/09/2023           Imaging Results Review: I personally reviewed the following image studies in PACS and associated radiology reports: chest xray. My interpretation of the radiology images/reports is: no acute cardiopulmonary disease.  Other Study Results Review: EKG was personally reviewed and my interpretation is: NSR. HR 84..    Administrative Statements   ** Please Note: This note has been constructed using a voice recognition system. **

## 2025-01-19 NOTE — PLAN OF CARE
Problem: PAIN - ADULT  Goal: Verbalizes/displays adequate comfort level or baseline comfort level  Description: Interventions:  - Encourage patient to monitor pain and request assistance  - Assess pain using appropriate pain scale  - Administer analgesics based on type and severity of pain and evaluate response  - Implement non-pharmacological measures as appropriate and evaluate response  - Consider cultural and social influences on pain and pain management  - Notify physician/advanced practitioner if interventions unsuccessful or patient reports new pain  1/19/2025 0857 by Tammy Christiansen RN  Outcome: Progressing  1/19/2025 0856 by Tammy Christiansen RN  Outcome: Progressing     Problem: INFECTION - ADULT  Goal: Absence or prevention of progression during hospitalization  Description: INTERVENTIONS:  - Assess and monitor for signs and symptoms of infection  - Monitor lab/diagnostic results  - Monitor all insertion sites, i.e. indwelling lines, tubes, and drains  - Monitor endotracheal if appropriate and nasal secretions for changes in amount and color  - [place] appropriate cooling/warming therapies per order  - Administer medications as ordered  - Instruct and encourage patient and family to use good hand hygiene technique  - Identify and instruct in appropriate isolation precautions for identified infection/condition  1/19/2025 0857 by Tammy Christiansen RN  Outcome: Progressing  1/19/2025 0856 by Tammy Christiansen RN  Outcome: Progressing  Goal: Absence of fever/infection during neutropenic period  Description: INTERVENTIONS:  - Monitor WBC    1/19/2025 0857 by Tammy Christiansen RN  Outcome: Progressing  1/19/2025 0856 by Tammy Christiansen RN  Outcome: Progressing     Problem: SAFETY ADULT  Goal: Patient will remain free of falls  Description: INTERVENTIONS:  - Educate patient/family on patient safety including physical limitations  - Instruct patient to call for assistance with activity   - Consult OT/PT to assist with  strengthening/mobility   - Keep Call bell within reach  - Keep bed low and locked with side rails adjusted as appropriate  - Keep care items and personal belongings within reach  - Initiate and maintain comfort rounds  - Make Fall Risk Sign visible to staff  - Apply yellow socks and bracelet for high fall risk patients  - Consider moving patient to room near nurses station  1/19/2025 0857 by Tammy Christiansen RN  Outcome: Progressing  1/19/2025 0856 by Tammy Christiansen RN  Outcome: Progressing  Goal: Maintain or return to baseline ADL function  Description: INTERVENTIONS:  -  Assess patient's ability to carry out ADLs; assess patient's baseline for ADL function and identify physical deficits which impact ability to perform ADLs (bathing, care of mouth/teeth, toileting, grooming, dressing, etc.)  - Assess/evaluate cause of self-care deficits   - Assess range of motion  - Assess patient's mobility; develop plan if impaired  - Assess patient's need for assistive devices and provide as appropriate  - Encourage maximum independence but intervene and supervise when necessary  - Involve family in performance of ADLs  - Assess for home care needs following discharge   - Consider OT consult to assist with ADL evaluation and planning for discharge  - Provide patient education as appropriate  1/19/2025 0857 by Tammy Christiansen RN  Outcome: Progressing  1/19/2025 0856 by Tammy Christiansen RN  Outcome: Progressing  Goal: Maintains/Returns to pre admission functional level  Description: INTERVENTIONS:  - Perform AM-PAC 6 Click Basic Mobility/ Daily Activity assessment daily.  - Set and communicate daily mobility goal to care team and patient/family/caregiver.   - Collaborate with rehabilitation services on mobility goals if consulted  - Out of bed for toileting  - Record patient progress and toleration of activity level   1/19/2025 0857 by Tammy Christiansen RN  Outcome: Progressing  1/19/2025 0856 by Tammy Christiansen RN  Outcome: Progressing     Problem:  DISCHARGE PLANNING  Goal: Discharge to home or other facility with appropriate resources  Description: INTERVENTIONS:  - Identify barriers to discharge w/patient and caregiver  - Arrange for needed discharge resources and transportation as appropriate  - Identify discharge learning needs (meds, wound care, etc.)  - Arrange for interpretive services to assist at discharge as needed  - Refer to Case Management Department for coordinating discharge planning if the patient needs post-hospital services based on physician/advanced practitioner order or complex needs related to functional status, cognitive ability, or social support system  1/19/2025 0857 by Tammy Christiansen RN  Outcome: Progressing  1/19/2025 0856 by Tammy Christiansen RN  Outcome: Progressing     Problem: Knowledge Deficit  Goal: Patient/family/caregiver demonstrates understanding of disease process, treatment plan, medications, and discharge instructions  Description: Complete learning assessment and assess knowledge base.  Interventions:  - Provide teaching at level of understanding  - Provide teaching via preferred learning methods  1/19/2025 0857 by Tammy Christiansen RN  Outcome: Progressing  1/19/2025 0856 by Tammy Christiansen RN  Outcome: Progressing     Problem: RESPIRATORY - ADULT  Goal: Achieves optimal ventilation and oxygenation  Description: INTERVENTIONS:  - Assess for changes in respiratory status  - Assess for changes in mentation and behavior  - Position to facilitate oxygenation and minimize respiratory effort  - Oxygen administered by appropriate delivery if ordered  - Initiate smoking cessation education as indicated  - Encourage broncho-pulmonary hygiene including cough, deep breathe, Incentive Spirometry  - Assess the need for suctioning and aspirate as needed  - Assess and instruct to report SOB or any respiratory difficulty  - Respiratory Therapy support as indicated  1/19/2025 0857 by Tammy Christiansen RN  Outcome: Progressing  1/19/2025 0856 by Tammy  Rice, RN  Outcome: Progressing     Problem: SKIN/TISSUE INTEGRITY - ADULT  Goal: Skin Integrity remains intact(Skin Breakdown Prevention)  Description: Assess:  -Perform Jack assessment every   -Clean and moisturize skin every   -Inspect skin when repositioning, toileting, and assisting with ADLS  -Assess under medical devices such as  every   -Assess extremities for adequate circulation and sensation     Bed Management:  -Have minimal linens on bed & keep smooth, unwrinkled  -Change linens as needed when moist or perspiring  -Avoid sitting or lying in one position for more than  hours while in bed  -Keep HOB at degrees     Toileting:  -Offer bedside commode  -Assess for incontinence every   -Use incontinent care products after each incontinent episode such as wipes and pads and hydraguard cream    Activity:  -Mobilize patient  times a day  -Encourage activity and walks on unit  -Encourage or provide ROM exercises   -Turn and reposition patient every  Hours  -Use appropriate equipment to lift or move patient in bed  -Instruct/ Assist with weight shifting every  when out of bed in chair  -Consider limitation of chair time  hour intervals    Skin Care:  -Avoid use of baby powder, tape, friction and shearing, hot water or constrictive clothing  -Relieve pressure over bony prominences using   -Do not massage red bony areas    Next Steps:  -Teach patient strategies to minimize risks such as    -Consider consults to  interdisciplinary teams such as   1/19/2025 0857 by Tammy Christiansen RN  Outcome: Progressing  1/19/2025 0856 by Tammy Christiansen RN  Outcome: Progressing

## 2025-01-19 NOTE — ASSESSMENT & PLAN NOTE
Lab Results   Component Value Date    EGFR 36 01/19/2025    EGFR 31 12/02/2024    EGFR 30 11/30/2024    CREATININE 1.36 (H) 01/19/2025    CREATININE 1.54 (H) 12/02/2024    CREATININE 1.56 (H) 11/30/2024     CKD in setting of hypertensive, diabetic nephropathy   Baseline Cr: 1.3-1.5  Admit Cr: 1.36     Plan:  Monitor renal function, renal dose medications, maintain normotension/euvolemia, avoid nephrotoxic agents, I&Os

## 2025-01-19 NOTE — ASSESSMENT & PLAN NOTE
Wt Readings from Last 3 Encounters:   01/19/25 85.2 kg (187 lb 13.3 oz)   12/20/24 81.2 kg (179 lb)   12/02/24 81.3 kg (179 lb 3.7 oz)     Limited TTE 11/22/2024: EF 51% septum is akinetic to paradoxical.  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  TTE 5/23/2024: LVEF 70%, grade 1 diastolic dysfunction, mild LVOT obstruction, moderate annual calcification, mild tricuspid regurgitation.    Home heart failure regimen: Torsemide 30 mg twice daily and extra 20 mg as needed -- magnesium 400 twice daily --.  Current HF Meds: Furosemide 50 mg IV twice daily aspirin 81 mg daily atorvastatin 40 mg daily metoprolol succinate 12.5 mg daily.    Labs: Sodium 126 potassium 4.2 chloride 85 bicarb 33 BUN 24 creatinine 1.36 GFR 36 -- normal LFTs --  -- negative high sensitive troponins x 3.    CXR 1/19/2025: Cardiomediastinal silhouette.  No pulmonary vascular congestion noted.  Raised right hemidiaphragm.  No consolidation or pleural effusion.    PLAN:  Agree with continuing current regimen  Will follow-up on urine output and follow-up on labs in AM.  Correct electrolyte and metabolic abnormalities.  Heart failure precautions and monitoring are advised.

## 2025-01-19 NOTE — ASSESSMENT & PLAN NOTE
Wt Readings from Last 3 Encounters:   01/19/25 85.2 kg (187 lb 13.3 oz)   12/20/24 81.2 kg (179 lb)   12/02/24 81.3 kg (179 lb 3.7 oz)     With increasing weight, dyspnea, lower extremity edema. CXR reviewed no acute cardiopulmonary disease  TTE 11/2024 reviewed LVEF 51%, unable to assess diastolic function  Home diuretic: torsemide 30mg daily     Plan:  Diuresis: IV lasix 50mg twice daily   GDMT: toprol XL 12.5mg daily  Daily weights, I&O, monitor and replenish electrolytes

## 2025-01-20 PROBLEM — K21.9 GASTRO-ESOPHAGEAL REFLUX DISEASE WITHOUT ESOPHAGITIS: Status: ACTIVE | Noted: 2024-10-19

## 2025-01-20 LAB
ALBUMIN SERPL BCG-MCNC: 3.5 G/DL (ref 3.5–5)
ALP SERPL-CCNC: 101 U/L (ref 34–104)
ALT SERPL W P-5'-P-CCNC: 8 U/L (ref 7–52)
ANION GAP SERPL CALCULATED.3IONS-SCNC: 10 MMOL/L (ref 4–13)
AST SERPL W P-5'-P-CCNC: 15 U/L (ref 13–39)
BASOPHILS # BLD AUTO: 0.03 THOUSANDS/ΜL (ref 0–0.1)
BASOPHILS NFR BLD AUTO: 0 % (ref 0–1)
BILIRUB SERPL-MCNC: 0.32 MG/DL (ref 0.2–1)
BUN SERPL-MCNC: 22 MG/DL (ref 5–25)
CALCIUM SERPL-MCNC: 8.8 MG/DL (ref 8.4–10.2)
CHLORIDE SERPL-SCNC: 90 MMOL/L (ref 96–108)
CO2 SERPL-SCNC: 35 MMOL/L (ref 21–32)
CREAT SERPL-MCNC: 1.37 MG/DL (ref 0.6–1.3)
EOSINOPHIL # BLD AUTO: 0.38 THOUSAND/ΜL (ref 0–0.61)
EOSINOPHIL NFR BLD AUTO: 6 % (ref 0–6)
ERYTHROCYTE [DISTWIDTH] IN BLOOD BY AUTOMATED COUNT: 14.3 % (ref 11.6–15.1)
FERRITIN SERPL-MCNC: 120 NG/ML (ref 11–307)
FLUAV RNA RESP QL NAA+PROBE: NEGATIVE
FLUBV RNA RESP QL NAA+PROBE: NEGATIVE
GFR SERPL CREATININE-BSD FRML MDRD: 36 ML/MIN/1.73SQ M
GLUCOSE SERPL-MCNC: 107 MG/DL (ref 65–140)
GLUCOSE SERPL-MCNC: 126 MG/DL (ref 65–140)
GLUCOSE SERPL-MCNC: 129 MG/DL (ref 65–140)
GLUCOSE SERPL-MCNC: 145 MG/DL (ref 65–140)
GLUCOSE SERPL-MCNC: 196 MG/DL (ref 65–140)
HCT VFR BLD AUTO: 29.4 % (ref 34.8–46.1)
HGB BLD-MCNC: 8.9 G/DL (ref 11.5–15.4)
IMM GRANULOCYTES # BLD AUTO: 0.02 THOUSAND/UL (ref 0–0.2)
IMM GRANULOCYTES NFR BLD AUTO: 0 % (ref 0–2)
IRON SATN MFR SERPL: 22 % (ref 15–50)
IRON SERPL-MCNC: 63 UG/DL (ref 50–212)
LYMPHOCYTES # BLD AUTO: 1.3 THOUSANDS/ΜL (ref 0.6–4.47)
LYMPHOCYTES NFR BLD AUTO: 19 % (ref 14–44)
MAGNESIUM SERPL-MCNC: 2.2 MG/DL (ref 1.9–2.7)
MCH RBC QN AUTO: 27.3 PG (ref 26.8–34.3)
MCHC RBC AUTO-ENTMCNC: 30.3 G/DL (ref 31.4–37.4)
MCV RBC AUTO: 90 FL (ref 82–98)
MONOCYTES # BLD AUTO: 0.73 THOUSAND/ΜL (ref 0.17–1.22)
MONOCYTES NFR BLD AUTO: 11 % (ref 4–12)
MRSA NOSE QL CULT: NORMAL
NEUTROPHILS # BLD AUTO: 4.46 THOUSANDS/ΜL (ref 1.85–7.62)
NEUTS SEG NFR BLD AUTO: 64 % (ref 43–75)
NRBC BLD AUTO-RTO: 0 /100 WBCS
PHOSPHATE SERPL-MCNC: 3.5 MG/DL (ref 2.3–4.1)
PLATELET # BLD AUTO: 343 THOUSANDS/UL (ref 149–390)
PMV BLD AUTO: 9.9 FL (ref 8.9–12.7)
POTASSIUM SERPL-SCNC: 4.2 MMOL/L (ref 3.5–5.3)
PROT SERPL-MCNC: 6.5 G/DL (ref 6.4–8.4)
RBC # BLD AUTO: 3.26 MILLION/UL (ref 3.81–5.12)
RSV RNA RESP QL NAA+PROBE: NEGATIVE
SARS-COV-2 RNA RESP QL NAA+PROBE: NEGATIVE
SODIUM SERPL-SCNC: 135 MMOL/L (ref 135–147)
TIBC SERPL-MCNC: 284.2 UG/DL (ref 250–450)
TRANSFERRIN SERPL-MCNC: 203 MG/DL (ref 203–362)
TSH SERPL DL<=0.05 MIU/L-ACNC: 1.43 UIU/ML (ref 0.45–4.5)
UIBC SERPL-MCNC: 221 UG/DL (ref 155–355)
WBC # BLD AUTO: 6.92 THOUSAND/UL (ref 4.31–10.16)

## 2025-01-20 PROCEDURE — 85025 COMPLETE CBC W/AUTO DIFF WBC: CPT | Performed by: INTERNAL MEDICINE

## 2025-01-20 PROCEDURE — 82948 REAGENT STRIP/BLOOD GLUCOSE: CPT

## 2025-01-20 PROCEDURE — 80053 COMPREHEN METABOLIC PANEL: CPT | Performed by: INTERNAL MEDICINE

## 2025-01-20 PROCEDURE — 0241U HB NFCT DS VIR RESP RNA 4 TRGT: CPT

## 2025-01-20 PROCEDURE — 82728 ASSAY OF FERRITIN: CPT

## 2025-01-20 PROCEDURE — 99232 SBSQ HOSP IP/OBS MODERATE 35: CPT

## 2025-01-20 PROCEDURE — 83550 IRON BINDING TEST: CPT

## 2025-01-20 PROCEDURE — 83735 ASSAY OF MAGNESIUM: CPT | Performed by: INTERNAL MEDICINE

## 2025-01-20 PROCEDURE — 84100 ASSAY OF PHOSPHORUS: CPT | Performed by: INTERNAL MEDICINE

## 2025-01-20 PROCEDURE — 84443 ASSAY THYROID STIM HORMONE: CPT

## 2025-01-20 PROCEDURE — 99232 SBSQ HOSP IP/OBS MODERATE 35: CPT | Performed by: HOSPITALIST

## 2025-01-20 PROCEDURE — 83540 ASSAY OF IRON: CPT

## 2025-01-20 RX ORDER — LATANOPROST 50 UG/ML
1 SOLUTION/ DROPS OPHTHALMIC
Status: DISCONTINUED | OUTPATIENT
Start: 2025-01-20 | End: 2025-01-23 | Stop reason: HOSPADM

## 2025-01-20 RX ORDER — GABAPENTIN 300 MG/1
300 CAPSULE ORAL
Status: DISCONTINUED | OUTPATIENT
Start: 2025-01-20 | End: 2025-01-23 | Stop reason: HOSPADM

## 2025-01-20 RX ORDER — GUAIFENESIN 600 MG/1
600 TABLET, EXTENDED RELEASE ORAL EVERY 12 HOURS SCHEDULED
Status: DISCONTINUED | OUTPATIENT
Start: 2025-01-20 | End: 2025-01-23 | Stop reason: HOSPADM

## 2025-01-20 RX ORDER — GUAIFENESIN 200 MG/10ML
LIQUID ORAL DAILY
Status: DISCONTINUED | OUTPATIENT
Start: 2025-01-20 | End: 2025-01-23 | Stop reason: HOSPADM

## 2025-01-20 RX ORDER — SENNOSIDES 8.6 MG
17.2 TABLET ORAL
Status: DISCONTINUED | OUTPATIENT
Start: 2025-01-20 | End: 2025-01-23 | Stop reason: HOSPADM

## 2025-01-20 RX ORDER — FERROUS SULFATE 325(65) MG
325 TABLET ORAL
Status: DISCONTINUED | OUTPATIENT
Start: 2025-01-21 | End: 2025-01-23 | Stop reason: HOSPADM

## 2025-01-20 RX ORDER — ALBUTEROL SULFATE 90 UG/1
2 INHALANT RESPIRATORY (INHALATION) EVERY 6 HOURS PRN
Status: DISCONTINUED | OUTPATIENT
Start: 2025-01-20 | End: 2025-01-23 | Stop reason: HOSPADM

## 2025-01-20 RX ORDER — METHOCARBAMOL 750 MG/1
750 TABLET, FILM COATED ORAL EVERY 8 HOURS PRN
Status: DISCONTINUED | OUTPATIENT
Start: 2025-01-20 | End: 2025-01-23 | Stop reason: HOSPADM

## 2025-01-20 RX ORDER — LIDOCAINE 50 MG/G
1 PATCH TOPICAL DAILY
Status: DISCONTINUED | OUTPATIENT
Start: 2025-01-20 | End: 2025-01-23 | Stop reason: HOSPADM

## 2025-01-20 RX ORDER — FENTANYL 50 UG/1
1 PATCH TRANSDERMAL
Refills: 0 | Status: DISCONTINUED | OUTPATIENT
Start: 2025-01-20 | End: 2025-01-23 | Stop reason: HOSPADM

## 2025-01-20 RX ORDER — ACETAMINOPHEN 325 MG/1
975 TABLET ORAL EVERY 8 HOURS SCHEDULED
Status: DISCONTINUED | OUTPATIENT
Start: 2025-01-20 | End: 2025-01-23 | Stop reason: HOSPADM

## 2025-01-20 RX ORDER — FLUTICASONE PROPIONATE 50 MCG
2 SPRAY, SUSPENSION (ML) NASAL DAILY
Status: DISCONTINUED | OUTPATIENT
Start: 2025-01-20 | End: 2025-01-23 | Stop reason: HOSPADM

## 2025-01-20 RX ORDER — POLYETHYLENE GLYCOL 3350 17 G/17G
17 POWDER, FOR SOLUTION ORAL DAILY
Status: DISCONTINUED | OUTPATIENT
Start: 2025-01-20 | End: 2025-01-23 | Stop reason: HOSPADM

## 2025-01-20 RX ORDER — PANTOPRAZOLE SODIUM 40 MG/1
40 TABLET, DELAYED RELEASE ORAL DAILY
Status: DISCONTINUED | OUTPATIENT
Start: 2025-01-20 | End: 2025-01-23 | Stop reason: HOSPADM

## 2025-01-20 RX ORDER — DOCUSATE SODIUM 100 MG/1
100 CAPSULE, LIQUID FILLED ORAL 2 TIMES DAILY
Status: DISCONTINUED | OUTPATIENT
Start: 2025-01-20 | End: 2025-01-23 | Stop reason: HOSPADM

## 2025-01-20 RX ORDER — NYSTATIN 100000 [USP'U]/G
POWDER TOPICAL 2 TIMES DAILY
Status: DISCONTINUED | OUTPATIENT
Start: 2025-01-20 | End: 2025-01-23 | Stop reason: HOSPADM

## 2025-01-20 RX ORDER — FAMOTIDINE 20 MG/1
20 TABLET, FILM COATED ORAL DAILY
Status: DISCONTINUED | OUTPATIENT
Start: 2025-01-20 | End: 2025-01-23 | Stop reason: HOSPADM

## 2025-01-20 RX ORDER — LANOLIN ALCOHOL/MO/W.PET/CERES
400 CREAM (GRAM) TOPICAL 2 TIMES DAILY
Status: DISCONTINUED | OUTPATIENT
Start: 2025-01-20 | End: 2025-01-23 | Stop reason: HOSPADM

## 2025-01-20 RX ADMIN — FUROSEMIDE 50 MG: 10 INJECTION, SOLUTION INTRAVENOUS at 17:05

## 2025-01-20 RX ADMIN — GUAIFENESIN 600 MG: 600 TABLET, EXTENDED RELEASE ORAL at 10:22

## 2025-01-20 RX ADMIN — LIDOCAINE 1 PATCH: 50 PATCH TOPICAL at 10:17

## 2025-01-20 RX ADMIN — Medication 400 MG: at 17:24

## 2025-01-20 RX ADMIN — DOCUSATE SODIUM 100 MG: 100 CAPSULE, LIQUID FILLED ORAL at 17:24

## 2025-01-20 RX ADMIN — FUROSEMIDE 50 MG: 10 INJECTION, SOLUTION INTRAVENOUS at 10:18

## 2025-01-20 RX ADMIN — DOCUSATE SODIUM 100 MG: 100 CAPSULE, LIQUID FILLED ORAL at 10:18

## 2025-01-20 RX ADMIN — LEVOTHYROXINE SODIUM 37.5 MCG: 75 TABLET ORAL at 05:37

## 2025-01-20 RX ADMIN — POLYETHYLENE GLYCOL 3350 17 G: 17 POWDER, FOR SOLUTION ORAL at 10:17

## 2025-01-20 RX ADMIN — FAMOTIDINE 20 MG: 20 TABLET, FILM COATED ORAL at 10:18

## 2025-01-20 RX ADMIN — HEPARIN SODIUM 5000 UNITS: 5000 INJECTION INTRAVENOUS; SUBCUTANEOUS at 05:42

## 2025-01-20 RX ADMIN — Medication 400 MG: at 10:18

## 2025-01-20 RX ADMIN — ATORVASTATIN CALCIUM 40 MG: 40 TABLET, FILM COATED ORAL at 17:05

## 2025-01-20 RX ADMIN — MORPHINE SULFATE 15 MG: 15 TABLET ORAL at 14:14

## 2025-01-20 RX ADMIN — HEPARIN SODIUM 5000 UNITS: 5000 INJECTION INTRAVENOUS; SUBCUTANEOUS at 21:00

## 2025-01-20 RX ADMIN — NYSTATIN: 100000 POWDER TOPICAL at 12:29

## 2025-01-20 RX ADMIN — ACETAMINOPHEN 975 MG: 325 TABLET, FILM COATED ORAL at 14:14

## 2025-01-20 RX ADMIN — LATANOPROST 1 DROP: 50 SOLUTION OPHTHALMIC at 21:00

## 2025-01-20 RX ADMIN — Medication: at 17:05

## 2025-01-20 RX ADMIN — GABAPENTIN 300 MG: 300 CAPSULE ORAL at 19:32

## 2025-01-20 RX ADMIN — PANTOPRAZOLE SODIUM 40 MG: 40 TABLET, DELAYED RELEASE ORAL at 10:18

## 2025-01-20 RX ADMIN — HEPARIN SODIUM 5000 UNITS: 5000 INJECTION INTRAVENOUS; SUBCUTANEOUS at 14:14

## 2025-01-20 RX ADMIN — ACETAMINOPHEN 975 MG: 325 TABLET, FILM COATED ORAL at 21:00

## 2025-01-20 RX ADMIN — INSULIN LISPRO 2 UNITS: 100 INJECTION, SOLUTION INTRAVENOUS; SUBCUTANEOUS at 12:29

## 2025-01-20 RX ADMIN — FENTANYL 1 PATCH: 50 PATCH, EXTENDED RELEASE TRANSDERMAL at 10:17

## 2025-01-20 RX ADMIN — MORPHINE SULFATE 15 MG: 15 TABLET ORAL at 05:36

## 2025-01-20 RX ADMIN — SENNOSIDES 17.2 MG: 8.6 TABLET, FILM COATED ORAL at 21:00

## 2025-01-20 RX ADMIN — ONDANSETRON 4 MG: 2 INJECTION INTRAMUSCULAR; INTRAVENOUS at 19:33

## 2025-01-20 RX ADMIN — GUAIFENESIN 600 MG: 600 TABLET, EXTENDED RELEASE ORAL at 19:32

## 2025-01-20 RX ADMIN — NYSTATIN: 100000 POWDER TOPICAL at 17:05

## 2025-01-20 RX ADMIN — METOPROLOL SUCCINATE 12.5 MG: 25 TABLET, EXTENDED RELEASE ORAL at 10:22

## 2025-01-20 RX ADMIN — ASPIRIN 81 MG CHEWABLE TABLET 81 MG: 81 TABLET CHEWABLE at 10:22

## 2025-01-20 RX ADMIN — FLUTICASONE PROPIONATE 2 SPRAY: 50 SPRAY, METERED NASAL at 15:37

## 2025-01-20 NOTE — ASSESSMENT & PLAN NOTE
Wt Readings from Last 3 Encounters:   01/20/25 85.4 kg (188 lb 4.4 oz)   12/20/24 81.2 kg (179 lb)   12/02/24 81.3 kg (179 lb 3.7 oz)     Presented with increasing weight, bilateral lower extremity edema and decreased urine output via EMS  PTA on torsemide 30 mg daily, notes she follows a sodium fluid restricted diet at [facility] her [facility]WIN  TTE 11/2024 reviewed LVEF 51%, unable to assess diastolic function  Chest x-ray clear although BNP elevated at 222 and patient with hypervolemic hyponatremia  Seen by cardiology, continue IV Lasix 50 mg twice daily  With increasing weight, dyspnea, lower extremity edema. CXR reviewed no acute cardiopulmonary disease  Continue monitoring intake and output, daily weights  Start low-sodium diet, continue fluid restriction

## 2025-01-20 NOTE — ASSESSMENT & PLAN NOTE
With outpatient diagnosis of COPD, unclear severity.  No evidence exacerbation. Continue albuterol prn  Checking viral panel

## 2025-01-20 NOTE — PLAN OF CARE
Problem: PAIN - ADULT  Goal: Verbalizes/displays adequate comfort level or baseline comfort level  Description: Interventions:  - Encourage patient to monitor pain and request assistance  - Assess pain using appropriate pain scale  - Administer analgesics based on type and severity of pain and evaluate response  - Implement non-pharmacological measures as appropriate and evaluate response  - Consider cultural and social influences on pain and pain management  - Notify physician/advanced practitioner if interventions unsuccessful or patient reports new pain  Outcome: Progressing     Problem: INFECTION - ADULT  Goal: Absence or prevention of progression during hospitalization  Description: INTERVENTIONS:  - Assess and monitor for signs and symptoms of infection  - Monitor lab/diagnostic results  - Monitor all insertion sites, i.e. indwelling lines, tubes, and drains  - Monitor endotracheal if appropriate and nasal secretions for changes in amount and color  - [place] appropriate cooling/warming therapies per order  - Administer medications as ordered  - Instruct and encourage patient and family to use good hand hygiene technique  - Identify and instruct in appropriate isolation precautions for identified infection/condition  Outcome: Progressing  Goal: Absence of fever/infection during neutropenic period  Description: INTERVENTIONS:  - Monitor WBC    Outcome: Progressing     Problem: SAFETY ADULT  Goal: Patient will remain free of falls  Description: INTERVENTIONS:  - Educate patient/family on patient safety including physical limitations  - Instruct patient to call for assistance with activity   - Consult OT/PT to assist with strengthening/mobility   - Keep Call bell within reach  - Keep bed low and locked with side rails adjusted as appropriate  - Keep care items and personal belongings within reach  - Initiate and maintain comfort rounds  - Make Fall Risk Sign visible to staff  - Apply yellow socks and bracelet  for high fall risk patients  - Consider moving patient to room near nurses station  Outcome: Progressing  Goal: Maintain or return to baseline ADL function  Description: INTERVENTIONS:  -  Assess patient's ability to carry out ADLs; assess patient's baseline for ADL function and identify physical deficits which impact ability to perform ADLs (bathing, care of mouth/teeth, toileting, grooming, dressing, etc.)  - Assess/evaluate cause of self-care deficits   - Assess range of motion  - Assess patient's mobility; develop plan if impaired  - Assess patient's need for assistive devices and provide as appropriate  - Encourage maximum independence but intervene and supervise when necessary  - Involve family in performance of ADLs  - Assess for home care needs following discharge   - Consider OT consult to assist with ADL evaluation and planning for discharge  - Provide patient education as appropriate  Outcome: Progressing  Goal: Maintains/Returns to pre admission functional level  Description: INTERVENTIONS:  - Perform AM-PAC 6 Click Basic Mobility/ Daily Activity assessment daily.  - Set and communicate daily mobility goal to care team and patient/family/caregiver.   - Collaborate with rehabilitation services on mobility goals if consulted  - Out of bed for toileting  - Record patient progress and toleration of activity level   Outcome: Progressing     Problem: DISCHARGE PLANNING  Goal: Discharge to home or other facility with appropriate resources  Description: INTERVENTIONS:  - Identify barriers to discharge w/patient and caregiver  - Arrange for needed discharge resources and transportation as appropriate  - Identify discharge learning needs (meds, wound care, etc.)  - Arrange for interpretive services to assist at discharge as needed  - Refer to Case Management Department for coordinating discharge planning if the patient needs post-hospital services based on physician/advanced practitioner order or complex needs  related to functional status, cognitive ability, or social support system  Outcome: Progressing     Problem: Knowledge Deficit  Goal: Patient/family/caregiver demonstrates understanding of disease process, treatment plan, medications, and discharge instructions  Description: Complete learning assessment and assess knowledge base.  Interventions:  - Provide teaching at level of understanding  - Provide teaching via preferred learning methods  Outcome: Progressing     Problem: RESPIRATORY - ADULT  Goal: Achieves optimal ventilation and oxygenation  Description: INTERVENTIONS:  - Assess for changes in respiratory status  - Assess for changes in mentation and behavior  - Position to facilitate oxygenation and minimize respiratory effort  - Oxygen administered by appropriate delivery if ordered  - Initiate smoking cessation education as indicated  - Encourage broncho-pulmonary hygiene including cough, deep breathe, Incentive Spirometry  - Assess the need for suctioning and aspirate as needed  - Assess and instruct to report SOB or any respiratory difficulty  - Respiratory Therapy support as indicated  Outcome: Progressing     Problem: SKIN/TISSUE INTEGRITY - ADULT  Goal: Skin Integrity remains intact(Skin Breakdown Prevention)  Description: Assess:  -Perform Jack assessment every   -Clean and moisturize skin every   -Inspect skin when repositioning, toileting, and assisting with ADLS  -Assess under medical devices such as  every   -Assess extremities for adequate circulation and sensation     Bed Management:  -Have minimal linens on bed & keep smooth, unwrinkled  -Change linens as needed when moist or perspiring  -Avoid sitting or lying in one position for more than  hours while in bed  -Keep HOB at degrees     Toileting:  -Offer bedside commode  -Assess for incontinence every   -Use incontinent care products after each incontinent episode such as wipes and pads and hydraguard cream    Activity:  -Mobilize patient   times a day  -Encourage activity and walks on unit  -Encourage or provide ROM exercises   -Turn and reposition patient every  Hours  -Use appropriate equipment to lift or move patient in bed  -Instruct/ Assist with weight shifting every  when out of bed in chair  -Consider limitation of chair time  hour intervals    Skin Care:  -Avoid use of baby powder, tape, friction and shearing, hot water or constrictive clothing  -Relieve pressure over bony prominences using   -Do not massage red bony areas    Next Steps:  -Teach patient strategies to minimize risks such as    -Consider consults to  interdisciplinary teams such as   Outcome: Progressing     Problem: Prexisting or High Potential for Compromised Skin Integrity  Goal: Skin integrity is maintained or improved  Description: INTERVENTIONS:  - Identify patients at risk for skin breakdown  - Assess and monitor skin integrity  - Assess and monitor nutrition and hydration status  - Monitor labs   - Assess for incontinence   - Turn and reposition patient  - Assist with mobility/ambulation  - Relieve pressure over bony prominences  - Avoid friction and shearing  - Provide appropriate hygiene as needed including keeping skin clean and dry  - Evaluate need for skin moisturizer/barrier cream  - Collaborate with interdisciplinary team   - Patient/family teaching  - Consider wound care consult   Outcome: Progressing

## 2025-01-20 NOTE — ASSESSMENT & PLAN NOTE
Hypervolemic hyponatremia suspect in setting of volume overload, with appropriate rise over 24 hours and now resolved  Monitor closely with diuretics

## 2025-01-20 NOTE — ASSESSMENT & PLAN NOTE
With history of chronic pain disorder status post spinal stimulator with degenerative lumbar spinal stenosis, was transitioned to oral hydromorphone and now is on morphine sulfate  Per PDMP patient is on morphine sulfate 15 mg every 6 hours for severe pain, last filled 1/13/2025  Patient is also on fentanyl 50 mcg an hour patch every third day.  Last applied 1/16/2025, will change today  Continue muscle relaxer with Robaxin as needed, gabapentin qhs prn, tylenol scheduled

## 2025-01-20 NOTE — DISCHARGE INSTR - OTHER ORDERS
Wound Care Plan:   1-P500 low air-loss mattress.  2-Elevate lower extremities for edema management.  Ensure heels float off of bed/chair surface to offload pressure.  3-Offloading air cushion in chair when out of bed.  4-Apply lotion to body daily and as needed.  5-Turn/reposition every 2 hours while in bed and weight shift frequently while in chair for pressure re-distribution on skin.   6-Bilateral legs--cleanse with soap and water, pat dry.  Apply Eucerin lotion to lower legs, feet, and heels.  Cover open/fragile areas to pretibial with non-adherent strips (adadptic and ABD).  Wrap legs with danita and ACE from toes to just below the knee.  Change dressings daily and as needed.  7-Buttocks/sacrum--cleanse with soap and water, pat dry.  Apply Triad paste three times daily and as needed with elvira-anal care.  8-Abdominal folds--cleanse with soap and water, pat dry.  Apply Nystatin powder twice daily.

## 2025-01-20 NOTE — ASSESSMENT & PLAN NOTE
Lab Results   Component Value Date    HGBA1C 7.4 (H) 10/12/2024     Recent Labs     01/19/25  1141 01/19/25  1614 01/19/25  2117 01/20/25  0659   POCGLU 109 146* 126 129     Holding home metformin, A1c near goal  Continue sliding scale insulin, start level 3 diabetic diet

## 2025-01-20 NOTE — ASSESSMENT & PLAN NOTE
Currently requiring 2 L nasal cannula to maintain above 88%  Pt reports she is not on oxygen at home  Chest x-ray clear without evidence of pneumonia  Continue IV diuretics as stated above, will check respiratory panel due to some congestion and patient is from facility

## 2025-01-20 NOTE — PROGRESS NOTES
Progress Note - Hospitalist   Name: Mattie Varela 79 y.o. female I MRN: [id number]  Unit/Bed#: E5 -01 I Date of Admission: 1/19/2025   Date of Service: 1/20/2025 I Hospital Day: 0    Assessment & Plan  Acute on chronic diastolic congestive heart failure (HCC)  Wt Readings from Last 3 Encounters:   01/20/25 85.4 kg (188 lb 4.4 oz)   12/20/24 81.2 kg (179 lb)   12/02/24 81.3 kg (179 lb 3.7 oz)     Presented with increasing weight, bilateral lower extremity edema and decreased urine output via EMS  PTA on torsemide 30 mg daily, notes she follows a sodium fluid restricted diet at [facility] her [facility]WIN  TTE 11/2024 reviewed LVEF 51%, unable to assess diastolic function  Chest x-ray clear although BNP elevated at 222 and patient with hypervolemic hyponatremia  Seen by cardiology, continue IV Lasix 50 mg twice daily  With increasing weight, dyspnea, lower extremity edema. CXR reviewed no acute cardiopulmonary disease  Continue monitoring intake and output, daily weights  Start low-sodium diet, continue fluid restriction  Acute respiratory failure with hypoxia (HCC)  Currently requiring 2 L nasal cannula to maintain above 88%  Pt reports she is not on oxygen at home  Chest x-ray clear without evidence of pneumonia  Continue IV diuretics as stated above, will check respiratory panel due to some congestion and patient is from facility  Stage 3b chronic kidney disease (HCC)  Lab Results   Component Value Date    EGFR 36 01/20/2025    EGFR 36 01/19/2025    EGFR 31 12/02/2024    CREATININE 1.37 (H) 01/20/2025    CREATININE 1.36 (H) 01/19/2025    CREATININE 1.54 (H) 12/02/2024     Baseline creatinine around 1.3-1.5, remains at baseline, monitor with diuresis  Type 2 diabetes mellitus with other skin complications ([facility])  Lab Results   Component Value Date    HGBA1C 7.4 (H) 10/12/2024     Recent Labs     01/19/25  1141 01/19/25  1614 01/19/25  2117 01/20/25  0659   POCGLU 109 146* 126 129     Holding home metformin, A1c near  goal  Continue sliding scale insulin, start level 3 diabetic diet  Urinary retention  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  Draining yellow urine.   It is unclear if patient was started on antibiotic at [facility], will discuss with CM to obtain med list  Continue outpatient follow-up with urology  Maintain urogenital care  Anemia in stage 3b chronic kidney disease  (HCC)  Baseline hemoglobin around 9-10, stable at 8.9  No she had a bowel movement 2 days ago that was not dark or bloody  No gross signs of bleeding on exam  Update iron panel  Hyponatremia  Hypervolemic hyponatremia suspect in setting of volume overload, with appropriate rise over 24 hours and now resolved  Monitor closely with diuretics  Chronic pain disorder  With history of chronic pain disorder status post spinal stimulator with degenerative lumbar spinal stenosis, was transitioned to oral hydromorphone and now is on morphine sulfate  Per PDMP patient is on morphine sulfate 15 mg every 6 hours for severe pain, last filled 1/13/2025  Patient is also on fentanyl 50 mcg an hour patch every third day.  Last applied 1/16/2025, will change today  Continue muscle relaxer with Robaxin as needed, gabapentin qhs prn, tylenol scheduled  Glaucoma  Continue home eyedrops  Hypothyroidism  Continue levothyroxine, check TSH  Patella fracture  With history of patella fracture after she fell at rehab in November  Recently saw orthopedics in December 2024  Cleared to ambulate with walker at baseline  PT OT eval's  Insomnia  Previously taken off Ambien, continue melatonin prn qhs  Gastro-esophageal reflux disease without esophagitis  Patient had an ulcer in 2022, denies any dark or bloody stools.  Does deal with some reflux and epigastric discomfort from time to time.  Continue Pepcid  and Protonix  Constipation  Narcotic induced, continue home bowel regimen  Last noted bowel movement 2 days ago, not dark or bloody  Monitor output  Chronic obstructive pulmonary disease, unspecified COPD type (HCC)  With outpatient diagnosis of COPD, unclear severity.  No evidence exacerbation. Continue albuterol prn  Checking viral panel    VTE Pharmacologic Prophylaxis: VTE Score: 4 Moderate Risk (Score 3-4) - Pharmacological DVT Prophylaxis Ordered: heparin.    Mobility:   Basic Mobility Inpatient Raw Score: 20  JH-HLM Goal: 6: Walk 10 steps or more  JH-HLM Achieved: 4: Move to chair/commode  JH-HLM Goal achieved. Continue to encourage appropriate mobility.    Patient Centered Rounds: I performed bedside rounds with nursing staff today.   Discussions with Specialists or Other Care Team Provider: CM    Education and Discussions with Family / Patient: Line busy will try again.    Current Length of Stay: 0 day(s)  Current Patient Status: Observation   Certification Statement: The patient will continue to require additional inpatient hospital stay due to chf, pt/ot  Discharge Plan: Anticipate discharge in 48-72 hrs to prior assisted or independent living facility.    Code Status: Level 1 - Full Code    Subjective   Patient seen and examined sitting up in the chair.  Notes she is having acid reflux today and some upper abdominal discomfort after eating and would like her medicine.  Also with some muscle spasms that she gets and takes Robaxin sometimes.  She reports that she feels congested in her nose and her throat.  She cannot really remember what brought her in but does feel like her legs are swollen.  She notes she has 2 wounds on her lower legs.  She is requesting to have Mucinex.  No she had a bowel movement 2 days ago that was not dark or bloody.  She is tolerating breakfast fine without any nausea or vomiting.  Denies any recent fevers or chills that she is aware of.  No lower bladder or suprapubic pain.  She  notes she is on oral morphine now.  She is not on oxygen at home.    Objective :  HR:  [] 104  BP: (120-157)/(58-74) 120/61  Resp:  [16-17] 16  SpO2:  [93 %-100 %] 93 %  O2 Device: Nasal cannula  Nasal Cannula O2 Flow Rate (L/min):  [2 L/min] 2 L/min    Body mass index is 36.77 kg/m².     Input and Output Summary (last 24 hours):     Intake/Output Summary (Last 24 hours) at 1/20/2025 0931  Last data filed at 1/19/2025 2100  Gross per 24 hour   Intake 480 ml   Output 1800 ml   Net -1320 ml       Physical Exam  Constitutional:       General: She is not in acute distress.     Appearance: Normal appearance. She is not toxic-appearing.   HENT:      Mouth/Throat:      Pharynx: Oropharynx is clear.   Cardiovascular:      Rate and Rhythm: Regular rhythm. Tachycardia present.   Pulmonary:      Effort: Pulmonary effort is normal. No respiratory distress.      Breath sounds: No wheezing or rales.      Comments: 86-88 room air, 2 L NC at 93%  Abdominal:      General: Bowel sounds are normal.      Palpations: Abdomen is soft.      Tenderness: There is no abdominal tenderness.   Genitourinary:     Comments: Bateman catheter draining clear yellow urine  Musculoskeletal:      Right lower leg: Edema present.      Left lower leg: Edema present.      Comments: B/l ACE wrap, swelling b/l upper thighs.  Spontaneously moving all extremities   Skin:     General: Skin is warm and dry.   Neurological:      Mental Status: She is alert and oriented to person, place, and time.      Comments: Intermittently forgetful   Psychiatric:         Mood and Affect: Mood normal.       Lines/Drains:  Lines/Drains/Airways       Active Status       Name Placement date Placement time Site Days    Urethral Catheter 16 Fr. 10/26/24  1311  --  85                    Lab Results: I have reviewed the following results:   Results from last 7 days   Lab Units 01/20/25  0435   WBC Thousand/uL 6.92   HEMOGLOBIN g/dL 8.9*   HEMATOCRIT % 29.4*   PLATELETS  Thousands/uL 343   SEGS PCT % 64   LYMPHO PCT % 19   MONO PCT % 11   EOS PCT % 6     Results from last 7 days   Lab Units 01/20/25  0435   SODIUM mmol/L 135   POTASSIUM mmol/L 4.2   CHLORIDE mmol/L 90*   CO2 mmol/L 35*   BUN mg/dL 22   CREATININE mg/dL 1.37*   ANION GAP mmol/L 10   CALCIUM mg/dL 8.8   ALBUMIN g/dL 3.5   TOTAL BILIRUBIN mg/dL 0.32   ALK PHOS U/L 101   ALT U/L 8   AST U/L 15   GLUCOSE RANDOM mg/dL 107     Results from last 7 days   Lab Units 01/20/25  0659 01/19/25  2117 01/19/25  1614 01/19/25  1141 01/19/25  0902   POC GLUCOSE mg/dl 129 126 146* 109 114     Last 24 Hours Medication List:     Current Facility-Administered Medications:     aluminum-magnesium hydroxide-simethicone (MAALOX) oral suspension 30 mL, Q6H PRN    aspirin chewable tablet 81 mg, Daily    atorvastatin (LIPITOR) tablet 40 mg, Daily With Dinner    furosemide (LASIX) injection 50 mg, BID    heparin (porcine) subcutaneous injection 5,000 Units, Q8H ARY    insulin lispro (HumALOG/ADMELOG) 100 units/mL subcutaneous injection 1-6 Units, 4x Daily (AC & HS) **AND** Fingerstick Glucose (POCT), 4x Daily AC and at bedtime    levothyroxine tablet 37.5 mcg, Early Morning    metoprolol succinate (TOPROL-XL) 24 hr tablet 12.5 mg, Daily    morphine (MSIR) IR tablet 15 mg, Q6H PRN    ondansetron (ZOFRAN) injection 4 mg, Q6H PRN    polyethylene glycol (MIRALAX) packet 17 g, Daily PRN    Administrative Statements   Today, Patient Was Seen By: Jose Gaines PA-C    **Please Note: This note may have been constructed using a voice recognition system.**

## 2025-01-20 NOTE — UTILIZATION REVIEW
Initial Clinical Review    OBSERVATION    1/19  CHANGED TO IP ADMISSION 1/20 @  1410  The patient will continue to require additional inpatient hospital stay due to chf, pt/ot     Admission: Date/Time/Statement:   Admission Orders (From admission, onward)       Ordered        01/19/25 0436  Place in Observation  Once                          01/20/25 1410  INPATIENT ADMISSION  Once        Transfer Service: Hospitalist   Question Answer Comment   Level of Care Med Surg    Estimated length of stay More than 2 Midnights    Certification I certify that inpatient services are medically necessary for this patient for a duration of greater than two midnights. See H&P and MD Progress Notes for additional information about the patient's course of treatment.        01/20/25 1409       ED Arrival Information       Expected   -    Arrival   1/19/2025 03:21    Acuity   Emergent              Means of arrival   Ambulance    Escorted by   [facility] ([facility])    Service   Hospitalist    Admission type   Emergency              Arrival complaint   Leg Swelling             Chief Complaint   Patient presents with    Leg Swelling     Pt arrives via ems from [facility] for increased B/L leg swelling, decreased urinary output , and a cough        Initial Presentation: 79 y.o. female presents to ED via  EMS from facility with shortness of breath, cough and leg swelling, worsened  over the past few days.  Has  noticed weight gain.  PMH  is  CVA,  HTN, HFpEF, CAD, CKD and DM2.  CXR  shows  NAD.   Labs show  sodium  126,  BNP  222.   Admit  Observation with  Acute/chronic diastolic congestive heart failure  and plan is  IV lasix, daily weight, I &O, monitor labs.    Cardiology  consult  Continue  IV  lasix,  daily weight.  Monitor labs  and replace electrolytes as needed.      1/20  IP ADMISSIOn  Continue IV lasix.   Needs  PT/OT.  Complains of acid reflux, stomach discomfort, also muscle spasms and takes robaxin for same.    On  O2 2L  NC with sats  93  %.    O2 sats   86 -88 5   RA.  Bateman  intact and draining  clear yellow urine.    ED Treatment-Medication Administration from 01/19/2025 0321 to 01/19/2025 0839         Date/Time Order Dose Route Action     01/19/2025 0349 ondansetron (ZOFRAN) injection 4 mg 4 mg Intravenous Given     01/19/2025 0400 albuterol inhalation solution 5 mg 5 mg Nebulization Given     01/19/2025 0400 ipratropium (ATROVENT) 0.02 % inhalation solution 0.5 mg 0.5 mg Nebulization Given     01/19/2025 0404 furosemide (LASIX) injection 40 mg 40 mg Intravenous Given     01/19/2025 0802 aspirin chewable tablet 81 mg 81 mg Oral Given     01/19/2025 0758 levothyroxine tablet 37.5 mcg 37.5 mcg Oral Given     01/19/2025 0803 metoprolol succinate (TOPROL-XL) 24 hr tablet 12.5 mg 12.5 mg Oral Given     01/19/2025 0828 ondansetron (ZOFRAN) injection 4 mg 4 mg Intravenous Given     01/19/2025 0759 heparin (porcine) subcutaneous injection 5,000 Units 5,000 Units Subcutaneous Given            Scheduled Medications:  aspirin, 81 mg, Oral, Daily  atorvastatin, 40 mg, Oral, Daily With Dinner  furosemide, 50 mg, Intravenous, BID  heparin (porcine), 5,000 Units, Subcutaneous, Q8H ARY  insulin lispro, 1-6 Units, Subcutaneous, 4x Daily (AC & HS)  levothyroxine, 37.5 mcg, Oral, Early Morning  metoprolol succinate, 12.5 mg, Oral, Daily      Continuous IV Infusions:     PRN Meds:  aluminum-magnesium hydroxide-simethicone, 30 mL, Oral, Q6H PRN  morphine, 15 mg, Oral, Q6H PRN  ondansetron, 4 mg, Intravenous, Q6H PRN  polyethylene glycol, 17 g, Oral, Daily PRN      ED Triage Vitals   Temperature Pulse Respirations Blood Pressure SpO2 Pain Score   01/19/25 0324 01/19/25 0324 01/19/25 0324 01/19/25 0324 01/19/25 0324 01/19/25 0859   97.8 °F (36.6 °C) 86 19 158/65 (!) 84 % No Pain     Weight (last 2 days)       Date/Time Weight    01/20/25 0600 85.4 (188.27)    01/19/25 0324 85.2 (187.83)            Vital Signs (last 3 days)       Date/Time Temp  Pulse Resp BP MAP (mmHg) SpO2 Calculated FIO2 (%) - Nasal Cannula Nasal Cannula O2 Flow Rate (L/min) O2 Device Patient Position - Orthostatic VS Pain    01/20/25 0658 -- 104 16 120/61 81 93 % 28 2 L/min Nasal cannula Sitting --    01/20/25 0536 -- -- -- -- -- -- -- -- -- -- 8    01/19/25 2335 -- -- -- -- -- -- -- -- None (Room air) -- 10 - Worst Possible Pain    01/19/25 23:21:17 -- 92 -- 133/58 83 100 % -- -- -- -- --    01/19/25 15:25:25 -- 95 17 157/74 102 94 % -- -- None (Room air) Sitting --    01/19/25 1312 -- -- -- -- -- -- -- -- -- -- 8    01/19/25 0916 -- -- -- -- -- -- -- -- -- -- No Pain    01/19/25 0859 -- -- -- -- -- -- -- -- -- -- No Pain    01/19/25 08:47:38 98.1 °F (36.7 °C) 93 14 145/70 95 99 % 32 3 L/min Nasal cannula Lying --    01/19/25 0803 -- 97 -- 138/57 -- -- -- -- -- -- --    01/19/25 0800 -- 94 19 138/57 82 93 % 28 2 L/min Nasal cannula Lying --    01/19/25 0700 -- 87 12 116/54 78 95 % 28 2 L/min Nasal cannula Lying --    01/19/25 0600 -- 86 12 126/55 79 93 % 28 2 L/min Nasal cannula Lying --    01/19/25 0357 -- 80 19 115/55 -- 95 % 28 2 L/min Nasal cannula Lying --    01/19/25 0336 -- -- -- -- -- 96 % 44 6 L/min Nasal cannula -- --    01/19/25 0333 -- -- -- -- -- -- -- -- None (Room air) -- --    01/19/25 0324 97.8 °F (36.6 °C) 86 19 158/65 -- 84 % -- -- None (Room air) Lying --              Pertinent Labs/Diagnostic Test Results:   Radiology:  XR chest 1 view portable   ED Interpretation by Bill Lafleur DO (01/19 2961)   Wet read - no edema or infiltrate or effusion - similar to prior 11/24      Final Interpretation by Ambrosio Brush MD (01/19 1602)      No acute cardiopulmonary disease.            Workstation performed: [id number]           Cardiology:  ECG 12 lead   Final Result by Jackson Morris MD (01/19 1812)   Normal sinus rhythm   Non-specific intra-ventricular conduction block   Possible Anterolateral infarct (cited on or before 19-Jan-2025)   Abnormal ECG   When compared with  ECG of 19-Jan-2025 05:51, (unconfirmed)   No significant change was found   Confirmed by Jackson Morris ([id number]) on 1/19/2025 6:12:08 PM      ECG 12 lead   Final Result by Jackson Morris MD (01/19 1812)   Normal sinus rhythm   Non-specific intra-ventricular conduction block   Possible Anterolateral infarct (cited on or before 19-Jan-2025)   Abnormal ECG   When compared with ECG of 19-Jan-2025 03:28, (unconfirmed)   No significant change was found   Confirmed by Jackson Morris ([id number]) on 1/19/2025 6:12:14 PM      ECG 12 lead   Final Result by Jackson Morris MD (01/19 1812)   Normal sinus rhythm   Non-specific intra-ventricular conduction block   Cannot rule out Anterior infarct , age undetermined   Abnormal ECG   When compared with ECG of 23-Nov-2024 02:46,   No significant change was found   Confirmed by Jackson Morris ([id number]) on 1/19/2025 6:12:25 PM              Results from last 7 days   Lab Units 01/20/25  0435 01/19/25  0347   WBC Thousand/uL 6.92 6.92   HEMOGLOBIN g/dL 8.9* 8.7*   HEMATOCRIT % 29.4* 28.6*   PLATELETS Thousands/uL 343 317   TOTAL NEUT ABS Thousands/µL 4.46 4.19         Results from last 7 days   Lab Units 01/20/25  0435 01/19/25  0347   SODIUM mmol/L 135 126*   POTASSIUM mmol/L 4.2 4.2   CHLORIDE mmol/L 90* 85*   CO2 mmol/L 35* 33*   ANION GAP mmol/L 10 8   BUN mg/dL 22 24   CREATININE mg/dL 1.37* 1.36*   EGFR ml/min/1.73sq m 36 36   CALCIUM mg/dL 8.8 8.8   MAGNESIUM mg/dL 2.2  --    PHOSPHORUS mg/dL 3.5  --      Results from last 7 days   Lab Units 01/20/25  0435 01/19/25  0347   AST U/L 15 14   ALT U/L 8 9   ALK PHOS U/L 101 98   TOTAL PROTEIN g/dL 6.5 6.8   ALBUMIN g/dL 3.5 3.6   TOTAL BILIRUBIN mg/dL 0.32 0.44     Results from last 7 days   Lab Units 01/20/25  0659 01/19/25  2117 01/19/25  1614 01/19/25  1141 01/19/25  0902   POC GLUCOSE mg/dl 129 126 146* 109 114     Results from last 7 days   Lab Units 01/20/25  0435 01/19/25  0347   GLUCOSE RANDOM mg/dL 107 120              Results from  last 7 days   Lab Units 01/19/25  0347   PH YOVANI  7.394   PCO2 YOVANI mm Hg 49.8   PO2 YOVANI mm Hg 27.3*   HCO3 YOVANI mmol/L 29.7   BASE EXC YOVANI mmol/L 4.2   O2 CONTENT YOVANI ml/dL 6.4   O2 HGB, VENOUS % 48.6*             Results from last 7 days   Lab Units 01/19/25  0757 01/19/25  0553 01/19/25  0347   HS TNI 0HR ng/L  --   --  8   HS TNI 2HR ng/L  --  7  --    HSTNI D2 ng/L  --  -1  --    HS TNI 4HR ng/L 7  --   --    HSTNI D4 ng/L -1  --   --                    Results from last 7 days   Lab Units 01/19/25 0347   BNP pg/mL 222*         Present on Admission:   Acute on chronic diastolic congestive heart failure (HCC)   Type 2 diabetes mellitus with other skin complications (HCC)   Anemia in stage 3b chronic kidney disease  (HCC)   Stage 3b chronic kidney disease (HCC)   Hyponatremia   Glaucoma   Iron deficiency anemia secondary to inadequate dietary iron intake      Admitting Diagnosis: Hyponatremia [E87.1]  Primary hypertension [I10]  Leg swelling [M79.89]  CHF exacerbation (HCC) [I50.9]  Chronic diastolic congestive heart failure (HCC) [I50.32]  Type 2 diabetes mellitus (HCC) [E11.9]  Chronic obstructive pulmonary disease, unspecified COPD type (HCC) [J44.9]  Chronic heart failure with preserved ejection fraction (HFpEF) (HCC) [I50.32]  Age/Sex: 79 y.o. female    Network Utilization Review Department  ATTENTION: Please call with any questions or concerns to [phone] and carefully listen to the prompts so that you are directed to the right person. All voicemails are confidential.   For Discharge needs, contact Care Management DC Support Team at [phone] opt. [phone]  Send all requests for admission clinical reviews, approved or denied determinations and any other requests to dedicated fax number below belonging to the campus where the patient is receiving treatment. List of dedicated fax numbers for the Facilities:  FACILITY NAME UR FAX NUMBER   ADMISSION DENIALS (Administrative/Medical Necessity) [phone]    DISCHARGE SUPPORT TEAM (NETWORK) [phone]   PARENT CHILD HEALTH (Maternity/NICU/Pediatrics) [phone]   [facility] [phone]   [facility] [phone]   [facility] [phone]   [facility] [phone]   [facility] [phone]   [facility] [phone]   [facility] [phone]   [facility] [phone]   [facility] [phone]   [facility] [phone]   [facility] [phone]   [facility] [phone]

## 2025-01-20 NOTE — ASSESSMENT & PLAN NOTE
With history of patella fracture after she fell at rehab in November  Recently saw orthopedics in December 2024  Cleared to ambulate with walker at baseline  PT KEATON alonzo

## 2025-01-20 NOTE — PROGRESS NOTES
Progress Note - Cardiology   Name: Mattie Varela 79 y.o. female I MRN: [id number]  Unit/Bed#: E5 -01 I Date of Admission: 1/19/2025   Date of Service: 1/20/2025 I Hospital Day: 0     Assessment & Plan  Acute on chronic diastolic congestive heart failure (HCC)  Wt Readings from Last 3 Encounters:   01/20/25 85.4 kg (188 lb 4.4 oz)   12/20/24 81.2 kg (179 lb)   12/02/24 81.3 kg (179 lb 3.7 oz)     Limited TTE 11/22/2024: EF 51% septum is akinetic to paradoxical.  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  TTE 5/23/2024: LVEF 70%, grade 1 diastolic dysfunction, mild LVOT obstruction, moderate annual calcification, mild tricuspid regurgitation.      PLAN:  Improving with IV diuresis, will continue with IV Lasix 50 IV twice daily at least another day    Type 2 diabetes mellitus with other skin complications ([facility])  Lab Results   Component Value Date    HGBA1C 7.4 (H) 10/12/2024     Recent Labs     01/19/25  2117 01/20/25  0659 01/20/25  1227 01/20/25  1537   POCGLU 126 129 196* 126     Reasonably controlled.  -- Will continue current regimen.  Stage 3b chronic kidney disease (HCC)  Lab Results   Component Value Date    EGFR 36 01/20/2025    EGFR 36 01/19/2025    EGFR 31 12/02/2024    CREATININE 1.37 (H) 01/20/2025    CREATININE 1.36 (H) 01/19/2025    CREATININE 1.54 (H) 12/02/2024     Stage IIIb chronic kidney disease.  Overall renal function stable.  Has chronic urinary retention.  Continue monitoring urine output and renal function in AM.  Anemia in stage 3b chronic kidney disease  (HCC)  Chronic anemia hemoglobin 8.7 hematocrit 28.6 platelet count 317 WBC 6.92  Recommend Venofer since patient had Marked iron deficiency in November 2024.  Hyponatremia    Chronic pain disorder    Glaucoma    Hypothyroidism    Chronic obstructive pulmonary disease, unspecified COPD type (HCC)    Acute respiratory failure with hypoxia (HCC)    Urinary retention    Constipation    Patella  fracture    Insomnia    Gastro-esophageal reflux disease without esophagitis      Subjective:   HPI   improved with the IV diuretics, less swollen, breathing is improved      Review of Systems: As noted in HPI. Rest of ROS is negative.    Vitals:   /57 (BP Location: Right arm)   Pulse 104   Temp 98.1 °F (36.7 °C) (Oral)   Resp 16   Ht 5' (1.524 m)   Wt 85.4 kg (188 lb 4.4 oz)   LMP  (LMP Unknown)   SpO2 93%   BMI 36.77 kg/m²   I/O         01/18 0701 01/19 0700 01/19 0701 01/20 0700 01/20 0701 01/21 0700    P.O.  779 300    Total Intake(mL/kg)  779 (9.1) 300 (3.5)    Urine (mL/kg/hr) 850 2435 (1.2) 1150 (1.4)    Total Output 850 2435 1150    Net -850 -1656 -850                 Weight (last 2 days)       Date/Time Weight    01/20/25 0600 85.4 (188.27)    01/19/25 0324 85.2 (187.83)            Physical Exam   Constitutional: awake, alert and oriented, in no acute distress, no obvious deformities  Head: Normocephalic, without obvious abnormality, atraumatic  Eyes: conjunctivae clear and moist. Sclera anicteric.  No xanthelasmas. Pupils equal bilaterally.  Extraocular motions are full.  Ear nose mouth and throat: ears are symmetrical bilaterally, hearing appears to be equal bilaterally, no nasal discharge or epistaxis, oropharynx is clear with moist mucous membranes  Neck:  Trachea is midline, neck is supple, no thyromegaly or significant lymphadenopathy, there is full range of motion.  Lungs: Decreased breath sounds bilaterally  Heart: Regular rhythm with a Normal heart rate, S1, S2 normal, No Murmur, no click, rub or gallop, 2+ bilateral lower extremity edema  Abdomen: soft, non-tender; bowel sounds normal; no masses,  no organomegaly  Psychiatric:  Patient is oriented to time, place, person, mood/affect is negative for depression, anxiety, agitation, appears to have appropriate insight  Skin: Skin is warm, dry, intact. No obvious rashes or lesions on exposed extremities.  Nail beds are pink with no  cyanosis or clubbing.      TELEMETRY:   Lab Results:  Results from last 7 days   Lab Units 01/20/25  0435   WBC Thousand/uL 6.92   HEMOGLOBIN g/dL 8.9*   HEMATOCRIT % 29.4*   PLATELETS Thousands/uL 343     Results from last 7 days   Lab Units 01/20/25  0435   POTASSIUM mmol/L 4.2   CHLORIDE mmol/L 90*   CO2 mmol/L 35*   BUN mg/dL 22   CREATININE mg/dL 1.37*   CALCIUM mg/dL 8.8   ALK PHOS U/L 101   ALT U/L 8   AST U/L 15     Results from last 7 days   Lab Units 01/20/25  0435   POTASSIUM mmol/L 4.2   CHLORIDE mmol/L 90*   CO2 mmol/L 35*   BUN mg/dL 22   CREATININE mg/dL 1.37*   CALCIUM mg/dL 8.8           Medications:    Current Facility-Administered Medications:     acetaminophen (TYLENOL) tablet 975 mg, 975 mg, Oral, Q8H ARY, Jose Gaines PA-C, 975 mg at 01/20/25 1414    albuterol (PROVENTIL HFA,VENTOLIN HFA) inhaler 2 puff, 2 puff, Inhalation, Q6H PRN, Jose Gaines PA-C    aluminum-magnesium hydroxide-simethicone (MAALOX) oral suspension 30 mL, 30 mL, Oral, Q6H PRN, Brayden Thompson PA-C    aspirin chewable tablet 81 mg, 81 mg, Oral, Daily, Brayden Thompson PA-C, 81 mg at 01/20/25 1022    atorvastatin (LIPITOR) tablet 40 mg, 40 mg, Oral, Daily With Dinner, Brayden Thompson PA-C, 40 mg at 01/19/25 1718    docusate sodium (COLACE) capsule 100 mg, 100 mg, Oral, BID, Jose Gaiens PA-C, 100 mg at 01/20/25 1018    famotidine (PEPCID) tablet 20 mg, 20 mg, Oral, Daily, Jose Gaines PA-C, 20 mg at 01/20/25 1018    fentaNYL (DURAGESIC) 50 mcg/hr TD 72 hr patch 1 patch, 1 patch, Transdermal, Q72H, Jose Gaines PA-C 1 patch at 01/20/25 1017    [START ON 1/21/2025] ferrous sulfate tablet 325 mg, 325 mg, Oral, Daily With Breakfast, Jose Gaines PA-C    fluticasone (FLONASE) 50 mcg/act nasal spray 2 spray, 2 spray, Each Nare, Daily, Jose Gaines PA-C, 2 spray at 01/20/25 1537    furosemide (LASIX) injection 50 mg, 50 mg, Intravenous, BID, Brayden Thompson PA-C, 50 mg at 01/20/25 1018    gabapentin (NEURONTIN) capsule 300 mg, 300 mg,  Oral, HS, Jose Gaines PA-C    guaiFENesin (MUCINEX) 12 hr tablet 600 mg, 600 mg, Oral, Q12H ARY, Jose Gaines PA-C, 600 mg at 01/20/25 1022    heparin (porcine) subcutaneous injection 5,000 Units, 5,000 Units, Subcutaneous, Q8H ARY, Brayden Thompson PA-C, 5,000 Units at 01/20/25 1414    insulin lispro (HumALOG/ADMELOG) 100 units/mL subcutaneous injection 1-6 Units, 1-6 Units, Subcutaneous, 4x Daily (AC & HS), 2 Units at 01/20/25 1229 **AND** Fingerstick Glucose (POCT), , , 4x Daily AC and at bedtime, Brayden Thompson PA-C    latanoprost (XALATAN) 0.005 % ophthalmic solution 1 drop, 1 drop, Both Eyes, HS, Jose Gaines PA-C    levothyroxine tablet 37.5 mcg, 37.5 mcg, Oral, Early Morning, Brayden Thompson PA-C, 37.5 mcg at 01/20/25 0537    lidocaine (LIDODERM) 5 % patch 1 patch, 1 patch, Topical, Daily, Jose Gaines PA-C, 1 patch at 01/20/25 1017    magnesium Oxide (MAG-OX) tablet 400 mg, 400 mg, Oral, BID, Jose Gaines PA-C, 400 mg at 01/20/25 1018    melatonin tablet 3 mg, 3 mg, Oral, HS PRN, Jose Gaines PA-C    methocarbamol (ROBAXIN) tablet 750 mg, 750 mg, Oral, Q8H PRN, Jose Gaines PA-C    metoprolol succinate (TOPROL-XL) 24 hr tablet 12.5 mg, 12.5 mg, Oral, Daily, Brayden Thompson PA-C, 12.5 mg at 01/20/25 1022    morphine (MSIR) IR tablet 15 mg, 15 mg, Oral, Q6H PRN, Brayden Thompson PA-C, 15 mg at 01/20/25 1414    nystatin (MYCOSTATIN) powder, , Topical, BID, Jose Gaines PA-C, Given at 01/20/25 1229    ondansetron (ZOFRAN) injection 4 mg, 4 mg, Intravenous, Q6H PRN, Brayden Thompson PA-C, 4 mg at 01/19/25 0828    pantoprazole (PROTONIX) EC tablet 40 mg, 40 mg, Oral, Daily, Jose Gaines PA-C, 40 mg at 01/20/25 1018    polyethylene glycol (MIRALAX) packet 17 g, 17 g, Oral, Daily PRN, Brayden Thompson PA-C    polyethylene glycol (MIRALAX) packet 17 g, 17 g, Oral, Daily, Jose Gaines PA-C, 17 g at 01/20/25 1017    senna (SENOKOT) tablet 17.2 mg, 17.2 mg, Oral, HS, Jose Gaines PA-C    white petrolatum-mineral oil  "(EUCERIN,HYDROCERIN) cream, , Topical, Daily, Jose Gaines PA-C    Portions of the record may have been created with voice recognition software. Occasional wrong word or \"sound a like\" substitutions may have occurred due to the inherent limitations of voice recognition software. Read the chart carefully and recognize, using context, where substitutions have occurred.    Torey Farnsowrth DO, Merged with [facility], [facility]  1/20/2025 4:32 PM      "

## 2025-01-20 NOTE — ASSESSMENT & PLAN NOTE
Lab Results   Component Value Date    EGFR 36 01/20/2025    EGFR 36 01/19/2025    EGFR 31 12/02/2024    CREATININE 1.37 (H) 01/20/2025    CREATININE 1.36 (H) 01/19/2025    CREATININE 1.54 (H) 12/02/2024     Baseline creatinine around 1.3-1.5, remains at baseline, monitor with diuresis

## 2025-01-20 NOTE — ASSESSMENT & PLAN NOTE
Wt Readings from Last 3 Encounters:   01/20/25 85.4 kg (188 lb 4.4 oz)   12/20/24 81.2 kg (179 lb)   12/02/24 81.3 kg (179 lb 3.7 oz)     Limited TTE 11/22/2024: EF 51% septum is akinetic to paradoxical.  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  TTE 5/23/2024: LVEF 70%, grade 1 diastolic dysfunction, mild LVOT obstruction, moderate annual calcification, mild tricuspid regurgitation.      PLAN:  Improving with IV diuresis, will continue with IV Lasix 50 IV twice daily at least another day

## 2025-01-20 NOTE — ASSESSMENT & PLAN NOTE
Lab Results   Component Value Date    EGFR 36 01/20/2025    EGFR 36 01/19/2025    EGFR 31 12/02/2024    CREATININE 1.37 (H) 01/20/2025    CREATININE 1.36 (H) 01/19/2025    CREATININE 1.54 (H) 12/02/2024     Stage IIIb chronic kidney disease.  Overall renal function stable.  Has chronic urinary retention.  Continue monitoring urine output and renal function in AM.

## 2025-01-20 NOTE — ASSESSMENT & PLAN NOTE
Baseline hemoglobin around 9-10, stable at 8.9  No she had a bowel movement 2 days ago that was not dark or bloody  No gross signs of bleeding on exam  Update iron panel

## 2025-01-20 NOTE — ASSESSMENT & PLAN NOTE
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  Draining yellow urine.   It is unclear if patient was started on antibiotic at [facility], will discuss with CM to obtain med list  Continue outpatient follow-up with urology  Maintain urogenital care

## 2025-01-20 NOTE — WOUND OSTOMY CARE
Consult Note - Wound   Mattie A Nichole 79 y.o. female MRN: [id number]  Unit/Bed#: E5 -01 Encounter: [id number]      History and Present Illness:  79 year old female presented to the hospital with increased lower extremity swelling, decreased urinary output, and a cough.  Patient's history significant for CVA, HTN, CKD, DM, CHF.    Assessment Findings:   Patient agreeable to assessment.  Sitting up in the chair, able to stand with assist x 1 and walker.  Bateman catheter in place.  However, patient reports catheter leaks at times.    Bilateral heels intact and blanchable.    Right lower leg intact with edema and scaling.    MASD/intertriginous dermatitis to abdominal fold--pink, macular rash with pruritus.  Patient requesting Nystatin powder.  Likely beginning candida rash.  Left pretibial--pink, partial thickness open area with epithelialization noted.  No drainage.  Jovana-wound intact with edema and scaling.  Present on admission evolving deep tissue pressure injury with moisture damage (measured all as one wound)--evolving deep tissue injury to right distal buttock (extending to anus) with non-blanchable maroon intact epidermis and yellow slough.  No drainage or induration to this area.  Sacrum with scattered pink, partial thickness open areas and a linear darker wound bed to right medial buttock with partial thickness tissue loss.  Purple, round, blanchable area to left/mid sacrum.  No drainage or induration.  Jovana-wound intact with blanchable erythema.    See flowsheet for wound details.    Wound Care Plan:   1-P500 low air-loss mattress.  2-Elevate lower extremities for edema management.  Ensure heels float off of bed/chair surface to offload pressure.  3-Offloading air cushion in chair when out of bed.  4-Apply moisturizing skin cream to body daily and as needed.  5-Turn/reposition every 2 hours while in bed and weight shift frequently while in chair for pressure re-distribution on skin.   6-Bilateral  legs--cleanse with soap and water, pat dry.  Apply Eucerin lotion to lower legs, feet, and heels.  Cover open/fragile areas to pretibial with non-adherent strips (adadptic and ABD).  Wrap legs with danita and ACE from toes to just below the knee.  Change dressings daily and as needed.  7-Buttocks/sacrum--cleanse with soap and water, pat dry.  Apply Triad paste three times daily and as needed with elvira-anal care.  8-Abdominal folds--cleanse with soap and water, pat dry.  Apply Nystatin powder twice daily.    Wound care team to follow.  Plan of care reviewed with primary RN.    Wound 11/22/24 Pressure Injury Sacrum (Active)   Wound Image   01/20/25 1027   Wound Description Pink;Non-blanchable erythema;Yellow 01/20/25 1023   Pressure Injury Stage DTPI 01/20/25 1023   Elvira-wound Assessment Hyperpigmented;Erythema 01/20/25 1023   Wound Length (cm) 11 cm 01/20/25 1023   Wound Width (cm) 6 cm 01/20/25 1023   Wound Depth (cm) 0.1 cm 01/20/25 1023   Wound Surface Area (cm^2) 66 cm^2 01/20/25 1023   Wound Volume (cm^3) 6.6 cm^3 01/20/25 1023   Calculated Wound Volume (cm^3) 6.6 cm^3 01/20/25 1023   Drainage Amount None 01/20/25 1023   Treatments Cleansed 01/20/25 1023   Dressing Protective barrier 01/20/25 1023   Patient Tolerance Tolerated well 01/20/25 1023       Wound 01/19/25 MASD Pelvis Anterior (Active)   Wound Image   01/20/25 1022   Wound Description Dry;Pink 01/20/25 1022   Elvira-wound Assessment Intact 01/20/25 1022   Drainage Amount None 01/20/25 1022   Dressing Open to air 01/20/25 1022       Wound 01/19/25 Pretibial Left (Active)   Wound Image   01/20/25 1047   Wound Description Pink;Epithelialization 01/20/25 1047   Elvira-wound Assessment Pink;Scaly;Edema 01/20/25 1047   Wound Length (cm) 4 cm 01/20/25 1047   Wound Width (cm) 3.5 cm 01/20/25 1047   Wound Depth (cm) 0.1 cm 01/20/25 1047   Wound Surface Area (cm^2) 14 cm^2 01/20/25 1047   Wound Volume (cm^3) 1.4 cm^3 01/20/25 1047   Calculated Wound Volume (cm^3) 1.4  cm^3 01/20/25 1047   Drainage Amount None 01/20/25 1047   Non-staged Wound Description Partial thickness 01/20/25 1047   Treatments Cleansed;Elevated 01/20/25 1047   Dressing Non adherent;ABD;Gauze 01/20/25 1047   Dressing Changed Changed 01/20/25 1047   Patient Tolerance Tolerated well 01/20/25 1047   Dressing Status Dry;Clean;Intact 01/20/25 1047       Wound 01/19/25 Pretibial Right (Active)   Wound Image   01/20/25 1047   Wound Description Dry;Intact 01/20/25 1047   Jovana-wound Assessment Scaly;Edema 01/20/25 1047   Wound Length (cm) 0 cm 01/20/25 1047   Wound Width (cm) 0 cm 01/20/25 1047   Wound Depth (cm) 0 cm 01/20/25 1047   Wound Surface Area (cm^2) 0 cm^2 01/20/25 1047   Wound Volume (cm^3) 0 cm^3 01/20/25 1047   Calculated Wound Volume (cm^3) 0 cm^3 01/20/25 1047   Drainage Amount None 01/20/25 1047   Dressing Non adherent;ABD;Gauze 01/20/25 1047   Dressing Changed Changed 01/20/25 1047   Patient Tolerance Tolerated well 01/20/25 1047   Dressing Status Dry;Clean;Intact 01/20/25 1047       Michell Ennis RN, BSN, CWON

## 2025-01-20 NOTE — ASSESSMENT & PLAN NOTE
Patient had an ulcer in 2022, denies any dark or bloody stools.  Does deal with some reflux and epigastric discomfort from time to time.  Continue Pepcid and Protonix

## 2025-01-20 NOTE — ASSESSMENT & PLAN NOTE
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

## 2025-01-20 NOTE — PLAN OF CARE
Problem: PAIN - ADULT  Goal: Verbalizes/displays adequate comfort level or baseline comfort level  Description: Interventions:  - Encourage patient to monitor pain and request assistance  - Assess pain using appropriate pain scale  - Administer analgesics based on type and severity of pain and evaluate response  - Implement non-pharmacological measures as appropriate and evaluate response  - Consider cultural and social influences on pain and pain management  - Notify physician/advanced practitioner if interventions unsuccessful or patient reports new pain  Outcome: Progressing     Problem: INFECTION - ADULT  Goal: Absence or prevention of progression during hospitalization  Description: INTERVENTIONS:  - Assess and monitor for signs and symptoms of infection  - Monitor lab/diagnostic results  - Monitor all insertion sites, i.e. indwelling lines, tubes, and drains  - Monitor endotracheal if appropriate and nasal secretions for changes in amount and color  - [place] appropriate cooling/warming therapies per order  - Administer medications as ordered  - Instruct and encourage patient and family to use good hand hygiene technique  - Identify and instruct in appropriate isolation precautions for identified infection/condition  Outcome: Progressing  Goal: Absence of fever/infection during neutropenic period  Description: INTERVENTIONS:  - Monitor WBC    Outcome: Progressing     Problem: SAFETY ADULT  Goal: Patient will remain free of falls  Description: INTERVENTIONS:  - Educate patient/family on patient safety including physical limitations  - Instruct patient to call for assistance with activity   - Consult OT/PT to assist with strengthening/mobility   - Keep Call bell within reach  - Keep bed low and locked with side rails adjusted as appropriate  - Keep care items and personal belongings within reach  - Initiate and maintain comfort rounds  - Make Fall Risk Sign visible to staff  - Apply yellow socks and bracelet  for high fall risk patients  - Consider moving patient to room near nurses station  Outcome: Progressing  Goal: Maintain or return to baseline ADL function  Description: INTERVENTIONS:  -  Assess patient's ability to carry out ADLs; assess patient's baseline for ADL function and identify physical deficits which impact ability to perform ADLs (bathing, care of mouth/teeth, toileting, grooming, dressing, etc.)  - Assess/evaluate cause of self-care deficits   - Assess range of motion  - Assess patient's mobility; develop plan if impaired  - Assess patient's need for assistive devices and provide as appropriate  - Encourage maximum independence but intervene and supervise when necessary  - Involve family in performance of ADLs  - Assess for home care needs following discharge   - Consider OT consult to assist with ADL evaluation and planning for discharge  - Provide patient education as appropriate  Outcome: Progressing  Goal: Maintains/Returns to pre admission functional level  Description: INTERVENTIONS:  - Perform AM-PAC 6 Click Basic Mobility/ Daily Activity assessment daily.  - Set and communicate daily mobility goal to care team and patient/family/caregiver.   - Collaborate with rehabilitation services on mobility goals if consulted  - Out of bed for toileting  - Record patient progress and toleration of activity level   Outcome: Progressing     Problem: DISCHARGE PLANNING  Goal: Discharge to home or other facility with appropriate resources  Description: INTERVENTIONS:  - Identify barriers to discharge w/patient and caregiver  - Arrange for needed discharge resources and transportation as appropriate  - Identify discharge learning needs (meds, wound care, etc.)  - Arrange for interpretive services to assist at discharge as needed  - Refer to Case Management Department for coordinating discharge planning if the patient needs post-hospital services based on physician/advanced practitioner order or complex needs  related to functional status, cognitive ability, or social support system  Outcome: Progressing     Problem: Knowledge Deficit  Goal: Patient/family/caregiver demonstrates understanding of disease process, treatment plan, medications, and discharge instructions  Description: Complete learning assessment and assess knowledge base.  Interventions:  - Provide teaching at level of understanding  - Provide teaching via preferred learning methods  Outcome: Progressing     Problem: RESPIRATORY - ADULT  Goal: Achieves optimal ventilation and oxygenation  Description: INTERVENTIONS:  - Assess for changes in respiratory status  - Assess for changes in mentation and behavior  - Position to facilitate oxygenation and minimize respiratory effort  - Oxygen administered by appropriate delivery if ordered  - Initiate smoking cessation education as indicated  - Encourage broncho-pulmonary hygiene including cough, deep breathe, Incentive Spirometry  - Assess the need for suctioning and aspirate as needed  - Assess and instruct to report SOB or any respiratory difficulty  - Respiratory Therapy support as indicated  Outcome: Progressing     Problem: SKIN/TISSUE INTEGRITY - ADULT  Goal: Skin Integrity remains intact(Skin Breakdown Prevention)  Description: Assess:  -Perform Jack assessment every   -Clean and moisturize skin every   -Inspect skin when repositioning, toileting, and assisting with ADLS  -Assess under medical devices such as  every   -Assess extremities for adequate circulation and sensation     Bed Management:  -Have minimal linens on bed & keep smooth, unwrinkled  -Change linens as needed when moist or perspiring  -Avoid sitting or lying in one position for more than  hours while in bed  -Keep HOB at degrees     Toileting:  -Offer bedside commode  -Assess for incontinence every   -Use incontinent care products after each incontinent episode such as wipes and pads and hydraguard cream    Activity:  -Mobilize patient   times a day  -Encourage activity and walks on unit  -Encourage or provide ROM exercises   -Turn and reposition patient every  Hours  -Use appropriate equipment to lift or move patient in bed  -Instruct/ Assist with weight shifting every  when out of bed in chair  -Consider limitation of chair time  hour intervals    Skin Care:  -Avoid use of baby powder, tape, friction and shearing, hot water or constrictive clothing  -Relieve pressure over bony prominences using   -Do not massage red bony areas    Next Steps:  -Teach patient strategies to minimize risks such as    -Consider consults to  interdisciplinary teams such as   Outcome: Progressing     Problem: Prexisting or High Potential for Compromised Skin Integrity  Goal: Skin integrity is maintained or improved  Description: INTERVENTIONS:  - Identify patients at risk for skin breakdown  - Assess and monitor skin integrity  - Assess and monitor nutrition and hydration status  - Monitor labs   - Assess for incontinence   - Turn and reposition patient  - Assist with mobility/ambulation  - Relieve pressure over bony prominences  - Avoid friction and shearing  - Provide appropriate hygiene as needed including keeping skin clean and dry  - Evaluate need for skin moisturizer/barrier cream  - Collaborate with interdisciplinary team   - Patient/family teaching  - Consider wound care consult   Outcome: Progressing

## 2025-01-20 NOTE — ASSESSMENT & PLAN NOTE
Lab Results   Component Value Date    HGBA1C 7.4 (H) 10/12/2024     Recent Labs     01/19/25  2117 01/20/25  0659 01/20/25  1227 01/20/25  1537   POCGLU 126 129 196* 126     Reasonably controlled.  -- Will continue current regimen.

## 2025-01-20 NOTE — QUICK NOTE
Updated patients daughter Dr. Baires, all questions answered.     She expresses she is hopeful her mother can return to [facility]. I discussed with her that is the anticipation and she will be evaluated by PT/OT.

## 2025-01-21 LAB
ANION GAP SERPL CALCULATED.3IONS-SCNC: 5 MMOL/L (ref 4–13)
BUN SERPL-MCNC: 20 MG/DL (ref 5–25)
CALCIUM SERPL-MCNC: 8.9 MG/DL (ref 8.4–10.2)
CHLORIDE SERPL-SCNC: 91 MMOL/L (ref 96–108)
CO2 SERPL-SCNC: 42 MMOL/L (ref 21–32)
CREAT SERPL-MCNC: 1.42 MG/DL (ref 0.6–1.3)
ERYTHROCYTE [DISTWIDTH] IN BLOOD BY AUTOMATED COUNT: 14.6 % (ref 11.6–15.1)
GFR SERPL CREATININE-BSD FRML MDRD: 34 ML/MIN/1.73SQ M
GLUCOSE SERPL-MCNC: 109 MG/DL (ref 65–140)
GLUCOSE SERPL-MCNC: 137 MG/DL (ref 65–140)
GLUCOSE SERPL-MCNC: 163 MG/DL (ref 65–140)
GLUCOSE SERPL-MCNC: 170 MG/DL (ref 65–140)
GLUCOSE SERPL-MCNC: 179 MG/DL (ref 65–140)
HCT VFR BLD AUTO: 30.1 % (ref 34.8–46.1)
HGB BLD-MCNC: 9.1 G/DL (ref 11.5–15.4)
MCH RBC QN AUTO: 26.7 PG (ref 26.8–34.3)
MCHC RBC AUTO-ENTMCNC: 30.2 G/DL (ref 31.4–37.4)
MCV RBC AUTO: 88 FL (ref 82–98)
PLATELET # BLD AUTO: 294 THOUSANDS/UL (ref 149–390)
PMV BLD AUTO: 10.1 FL (ref 8.9–12.7)
POTASSIUM SERPL-SCNC: 4 MMOL/L (ref 3.5–5.3)
RBC # BLD AUTO: 3.41 MILLION/UL (ref 3.81–5.12)
SODIUM SERPL-SCNC: 138 MMOL/L (ref 135–147)
WBC # BLD AUTO: 6.16 THOUSAND/UL (ref 4.31–10.16)

## 2025-01-21 PROCEDURE — 85027 COMPLETE CBC AUTOMATED: CPT

## 2025-01-21 PROCEDURE — 82948 REAGENT STRIP/BLOOD GLUCOSE: CPT

## 2025-01-21 PROCEDURE — 97167 OT EVAL HIGH COMPLEX 60 MIN: CPT

## 2025-01-21 PROCEDURE — 80048 BASIC METABOLIC PNL TOTAL CA: CPT

## 2025-01-21 PROCEDURE — 99233 SBSQ HOSP IP/OBS HIGH 50: CPT | Performed by: PHYSICIAN ASSISTANT

## 2025-01-21 PROCEDURE — 99232 SBSQ HOSP IP/OBS MODERATE 35: CPT

## 2025-01-21 RX ADMIN — Medication 400 MG: at 09:22

## 2025-01-21 RX ADMIN — Medication 400 MG: at 17:00

## 2025-01-21 RX ADMIN — HEPARIN SODIUM 5000 UNITS: 5000 INJECTION INTRAVENOUS; SUBCUTANEOUS at 14:45

## 2025-01-21 RX ADMIN — DOCUSATE SODIUM 100 MG: 100 CAPSULE, LIQUID FILLED ORAL at 17:00

## 2025-01-21 RX ADMIN — METHOCARBAMOL 750 MG: 750 TABLET ORAL at 20:05

## 2025-01-21 RX ADMIN — DOCUSATE SODIUM 100 MG: 100 CAPSULE, LIQUID FILLED ORAL at 09:22

## 2025-01-21 RX ADMIN — INSULIN LISPRO 1 UNITS: 100 INJECTION, SOLUTION INTRAVENOUS; SUBCUTANEOUS at 11:45

## 2025-01-21 RX ADMIN — PANTOPRAZOLE SODIUM 40 MG: 40 TABLET, DELAYED RELEASE ORAL at 09:22

## 2025-01-21 RX ADMIN — GUAIFENESIN 600 MG: 600 TABLET, EXTENDED RELEASE ORAL at 20:32

## 2025-01-21 RX ADMIN — POLYETHYLENE GLYCOL 3350 17 G: 17 POWDER, FOR SOLUTION ORAL at 09:22

## 2025-01-21 RX ADMIN — METHOCARBAMOL 750 MG: 750 TABLET ORAL at 10:47

## 2025-01-21 RX ADMIN — SENNOSIDES 17.2 MG: 8.6 TABLET, FILM COATED ORAL at 21:11

## 2025-01-21 RX ADMIN — Medication 3 MG: at 21:16

## 2025-01-21 RX ADMIN — ACETAMINOPHEN 975 MG: 325 TABLET, FILM COATED ORAL at 06:01

## 2025-01-21 RX ADMIN — Medication: at 09:31

## 2025-01-21 RX ADMIN — ASPIRIN 81 MG CHEWABLE TABLET 81 MG: 81 TABLET CHEWABLE at 09:22

## 2025-01-21 RX ADMIN — LEVOTHYROXINE SODIUM 37.5 MCG: 75 TABLET ORAL at 06:01

## 2025-01-21 RX ADMIN — FLUTICASONE PROPIONATE 2 SPRAY: 50 SPRAY, METERED NASAL at 09:21

## 2025-01-21 RX ADMIN — GABAPENTIN 300 MG: 300 CAPSULE ORAL at 20:31

## 2025-01-21 RX ADMIN — ATORVASTATIN CALCIUM 40 MG: 40 TABLET, FILM COATED ORAL at 16:59

## 2025-01-21 RX ADMIN — GUAIFENESIN 600 MG: 600 TABLET, EXTENDED RELEASE ORAL at 09:22

## 2025-01-21 RX ADMIN — MORPHINE SULFATE 15 MG: 15 TABLET ORAL at 07:38

## 2025-01-21 RX ADMIN — NYSTATIN: 100000 POWDER TOPICAL at 17:00

## 2025-01-21 RX ADMIN — TORSEMIDE 30 MG: 10 TABLET ORAL at 16:59

## 2025-01-21 RX ADMIN — ACETAMINOPHEN 975 MG: 325 TABLET, FILM COATED ORAL at 14:45

## 2025-01-21 RX ADMIN — INSULIN LISPRO 1 UNITS: 100 INJECTION, SOLUTION INTRAVENOUS; SUBCUTANEOUS at 16:59

## 2025-01-21 RX ADMIN — MORPHINE SULFATE 15 MG: 15 TABLET ORAL at 21:32

## 2025-01-21 RX ADMIN — FERROUS SULFATE TAB 325 MG (65 MG ELEMENTAL FE) 325 MG: 325 (65 FE) TAB at 07:38

## 2025-01-21 RX ADMIN — ONDANSETRON 4 MG: 2 INJECTION INTRAMUSCULAR; INTRAVENOUS at 20:05

## 2025-01-21 RX ADMIN — LIDOCAINE 1 PATCH: 50 PATCH TOPICAL at 09:22

## 2025-01-21 RX ADMIN — HEPARIN SODIUM 5000 UNITS: 5000 INJECTION INTRAVENOUS; SUBCUTANEOUS at 06:01

## 2025-01-21 RX ADMIN — HEPARIN SODIUM 5000 UNITS: 5000 INJECTION INTRAVENOUS; SUBCUTANEOUS at 21:11

## 2025-01-21 RX ADMIN — FUROSEMIDE 50 MG: 10 INJECTION, SOLUTION INTRAVENOUS at 09:22

## 2025-01-21 RX ADMIN — LATANOPROST 1 DROP: 50 SOLUTION OPHTHALMIC at 21:11

## 2025-01-21 RX ADMIN — FAMOTIDINE 20 MG: 20 TABLET, FILM COATED ORAL at 09:22

## 2025-01-21 RX ADMIN — ACETAMINOPHEN 975 MG: 325 TABLET, FILM COATED ORAL at 21:11

## 2025-01-21 RX ADMIN — INSULIN LISPRO 1 UNITS: 100 INJECTION, SOLUTION INTRAVENOUS; SUBCUTANEOUS at 21:12

## 2025-01-21 RX ADMIN — METOPROLOL SUCCINATE 12.5 MG: 25 TABLET, EXTENDED RELEASE ORAL at 09:22

## 2025-01-21 RX ADMIN — MORPHINE SULFATE 15 MG: 15 TABLET ORAL at 15:24

## 2025-01-21 RX ADMIN — NYSTATIN: 100000 POWDER TOPICAL at 09:22

## 2025-01-21 NOTE — ASSESSMENT & PLAN NOTE
-- With history of patella fracture after she fell at rehab in November  -- Seen by orthopedics in December 2024  -- Cleared to ambulate with walker at baseline

## 2025-01-21 NOTE — ASSESSMENT & PLAN NOTE
Currently requiring 2 L nasal cannula to maintain above 88%  Pt reports she is not on oxygen at home  Chest x-ray clear without evidence of pneumonia  Transition to oral

## 2025-01-21 NOTE — ASSESSMENT & PLAN NOTE
Lab Results   Component Value Date    HGBA1C 7.4 (H) 10/12/2024     Recent Labs     01/20/25  1537 01/20/25 2036 01/21/25  0816 01/21/25  1138   POCGLU 126 145* 137 179*     Holding home metformin, A1c near goal  Continue sliding scale insulin, start level 3 diabetic diet

## 2025-01-21 NOTE — ASSESSMENT & PLAN NOTE
Wt Readings from Last 3 Encounters:   01/21/25 78.2 kg (172 lb 6.4 oz)   12/20/24 81.2 kg (179 lb)   12/02/24 81.3 kg (179 lb 3.7 oz)     Presented with increasing weight, bilateral lower extremity edema and decreased urine output via EMS  PTA on torsemide 30 mg daily, notes she follows a sodium fluid restricted diet at [facility] her [facility]  TTE 11/2024 reviewed LVEF 51%, unable to assess diastolic function  Chest x-ray clear although BNP elevated at 222 and patient with hypervolemic hyponatremia  With increasing weight, dyspnea, lower extremity edema. CXR reviewed no acute cardiopulmonary disease  Continue monitoring intake and output, daily weights  Start low-sodium diet, continue fluid restriction  S/p IV diuresis, now transition to p.o. by cardiology  Monitor weights

## 2025-01-21 NOTE — PLAN OF CARE
Problem: PAIN - ADULT  Goal: Verbalizes/displays adequate comfort level or baseline comfort level  Description: Interventions:  - Encourage patient to monitor pain and request assistance  - Assess pain using appropriate pain scale  - Administer analgesics based on type and severity of pain and evaluate response  - Implement non-pharmacological measures as appropriate and evaluate response  - Consider cultural and social influences on pain and pain management  - Notify physician/advanced practitioner if interventions unsuccessful or patient reports new pain  Outcome: Progressing     Problem: INFECTION - ADULT  Goal: Absence or prevention of progression during hospitalization  Description: INTERVENTIONS:  - Assess and monitor for signs and symptoms of infection  - Monitor lab/diagnostic results  - Monitor all insertion sites, i.e. indwelling lines, tubes, and drains  - Monitor endotracheal if appropriate and nasal secretions for changes in amount and color  - [place] appropriate cooling/warming therapies per order  - Administer medications as ordered  - Instruct and encourage patient and family to use good hand hygiene technique  - Identify and instruct in appropriate isolation precautions for identified infection/condition  Outcome: Progressing  Goal: Absence of fever/infection during neutropenic period  Description: INTERVENTIONS:  - Monitor WBC    Outcome: Progressing     Problem: SAFETY ADULT  Goal: Patient will remain free of falls  Description: INTERVENTIONS:  - Educate patient/family on patient safety including physical limitations  - Instruct patient to call for assistance with activity   - Consult OT/PT to assist with strengthening/mobility   - Keep Call bell within reach  - Keep bed low and locked with side rails adjusted as appropriate  - Keep care items and personal belongings within reach  - Initiate and maintain comfort rounds  - Make Fall Risk Sign visible to staff  - Apply yellow socks and bracelet  for high fall risk patients  - Consider moving patient to room near nurses station  Outcome: Progressing  Goal: Maintain or return to baseline ADL function  Description: INTERVENTIONS:  -  Assess patient's ability to carry out ADLs; assess patient's baseline for ADL function and identify physical deficits which impact ability to perform ADLs (bathing, care of mouth/teeth, toileting, grooming, dressing, etc.)  - Assess/evaluate cause of self-care deficits   - Assess range of motion  - Assess patient's mobility; develop plan if impaired  - Assess patient's need for assistive devices and provide as appropriate  - Encourage maximum independence but intervene and supervise when necessary  - Involve family in performance of ADLs  - Assess for home care needs following discharge   - Consider OT consult to assist with ADL evaluation and planning for discharge  - Provide patient education as appropriate  Outcome: Progressing  Goal: Maintains/Returns to pre admission functional level  Description: INTERVENTIONS:  - Perform AM-PAC 6 Click Basic Mobility/ Daily Activity assessment daily.  - Set and communicate daily mobility goal to care team and patient/family/caregiver.   - Collaborate with rehabilitation services on mobility goals if consulted  - Out of bed for toileting  - Record patient progress and toleration of activity level   Outcome: Progressing     Problem: DISCHARGE PLANNING  Goal: Discharge to home or other facility with appropriate resources  Description: INTERVENTIONS:  - Identify barriers to discharge w/patient and caregiver  - Arrange for needed discharge resources and transportation as appropriate  - Identify discharge learning needs (meds, wound care, etc.)  - Arrange for interpretive services to assist at discharge as needed  - Refer to Case Management Department for coordinating discharge planning if the patient needs post-hospital services based on physician/advanced practitioner order or complex needs  related to functional status, cognitive ability, or social support system  Outcome: Progressing     Problem: Knowledge Deficit  Goal: Patient/family/caregiver demonstrates understanding of disease process, treatment plan, medications, and discharge instructions  Description: Complete learning assessment and assess knowledge base.  Interventions:  - Provide teaching at level of understanding  - Provide teaching via preferred learning methods  Outcome: Progressing     Problem: RESPIRATORY - ADULT  Goal: Achieves optimal ventilation and oxygenation  Description: INTERVENTIONS:  - Assess for changes in respiratory status  - Assess for changes in mentation and behavior  - Position to facilitate oxygenation and minimize respiratory effort  - Oxygen administered by appropriate delivery if ordered  - Initiate smoking cessation education as indicated  - Encourage broncho-pulmonary hygiene including cough, deep breathe, Incentive Spirometry  - Assess the need for suctioning and aspirate as needed  - Assess and instruct to report SOB or any respiratory difficulty  - Respiratory Therapy support as indicated  Outcome: Progressing     Problem: SKIN/TISSUE INTEGRITY - ADULT  Goal: Skin Integrity remains intact(Skin Breakdown Prevention)  Description: Assess:  -Perform Jack assessment every   -Clean and moisturize skin every   -Inspect skin when repositioning, toileting, and assisting with ADLS  -Assess under medical devices such as  every   -Assess extremities for adequate circulation and sensation     Bed Management:  -Have minimal linens on bed & keep smooth, unwrinkled  -Change linens as needed when moist or perspiring  -Avoid sitting or lying in one position for more than  hours while in bed  -Keep HOB at degrees     Toileting:  -Offer bedside commode  -Assess for incontinence every   -Use incontinent care products after each incontinent episode such as wipes and pads and hydraguard cream    Activity:  -Mobilize patient   times a day  -Encourage activity and walks on unit  -Encourage or provide ROM exercises   -Turn and reposition patient every  Hours  -Use appropriate equipment to lift or move patient in bed  -Instruct/ Assist with weight shifting every  when out of bed in chair  -Consider limitation of chair time  hour intervals    Skin Care:  -Avoid use of baby powder, tape, friction and shearing, hot water or constrictive clothing  -Relieve pressure over bony prominences using   -Do not massage red bony areas    Next Steps:  -Teach patient strategies to minimize risks such as    -Consider consults to  interdisciplinary teams such as   Outcome: Progressing     Problem: Prexisting or High Potential for Compromised Skin Integrity  Goal: Skin integrity is maintained or improved  Description: INTERVENTIONS:  - Identify patients at risk for skin breakdown  - Assess and monitor skin integrity  - Assess and monitor nutrition and hydration status  - Monitor labs   - Assess for incontinence   - Turn and reposition patient  - Assist with mobility/ambulation  - Relieve pressure over bony prominences  - Avoid friction and shearing  - Provide appropriate hygiene as needed including keeping skin clean and dry  - Evaluate need for skin moisturizer/barrier cream  - Collaborate with interdisciplinary team   - Patient/family teaching  - Consider wound care consult   Outcome: Progressing

## 2025-01-21 NOTE — ASSESSMENT & PLAN NOTE
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  Draining yellow urine.   It is unclear if patient was started on antibiotic at [facility], will discuss with CM to obtain med list  Continue outpatient follow-up with urology  Maintain urogenital care

## 2025-01-21 NOTE — OCCUPATIONAL THERAPY NOTE
Occupational Therapy Evaluation     Patient Name: Mattie Varela  Today's Date: 1/21/2025  Problem List  Principal Problem:    Acute on chronic diastolic congestive heart failure (HCC)  Active Problems:    Type 2 diabetes mellitus with other skin complications ([facility])    Chronic pain disorder    Glaucoma    Hypothyroidism    Anemia in stage 3b chronic kidney disease  (HCC)    Chronic obstructive pulmonary disease, unspecified COPD type ([facility])    Acute respiratory failure with hypoxia ([facility])    Urinary retention    Constipation    Patella fracture    Insomnia    Stage 3b chronic kidney disease ([facility])    Hyponatremia    Gastro-esophageal reflux disease without esophagitis    Past Medical History  Past Medical History:   Diagnosis Date    Acid reflux     Acute kidney injury (HCC) 06/18/2022    Anemia     hx of iron-deficient    Anxiety     Arthritis     Asthma     last needed inhaler last year 2020    Basal cell carcinoma     upper lip    Chronic narcotic dependence ([facility])     Chronic pain     Colitis 11/19/2024    Colon polyp     Cystocele     Diabetes mellitus ([facility])     stable    Disease of thyroid gland     hypothyroidism    Diverticulosis     Dizziness     at times    Dysfunctional uterine bleeding     last assessed - [id number]    Dysphagia     Fibromyalgia     Gastric ulcer     Gastroparesis     History of colonic polyps     last assessed - [id number]    History of gastroesophageal reflux (GERD)     Hypercholesterolemia     Hyperlipidemia     Hypertension     Hyponatremia 01/13/2024    IBS (irritable bowel syndrome)     Pneumobilia 06/18/2022    Post laminectomy syndrome     S/P insertion of spinal cord stimulator 07/18/2018    s/p [employer] loop recorder 3/2/2024 03/02/2024    Seasonal allergies     Shortness of breath     exertional    Spinal stenosis     Status post lumbar spinal fusion 03/16/2018    Stroke ([facility])     pt states slight stroke March 2022     Past Surgical History  Past Surgical History:   Procedure  Laterality Date    APPENDECTOMY      BACK SURGERY      BREAST CYST EXCISION Left     CARDIAC CATHETERIZATION  11/14/2023    Procedure: Cardiac catheterization;  Surgeon: Randolph Holt MD;  [facility];  Service: Cardiology    CARDIAC CATHETERIZATION N/A 11/14/2023    Procedure: Cardiac Coronary Angiogram;  Surgeon: Randolph Holt MD;  [facility];  Service: Cardiology    CARDIAC ELECTROPHYSIOLOGY PROCEDURE N/A 3/2/2024    Procedure: Cardiac loop recorder implant;  Surgeon: Edu Fontaien MD;  [facility];  Service: Cardiology    CHOLECYSTECTOMY      COLONOSCOPY      ESOPHAGOGASTRODUODENOSCOPY N/A 09/28/2016    Procedure: ESOPHAGOGASTRODUODENOSCOPY (EGD);  Surgeon: Mylene Moeller MD;  Location: [facility];  Service:     HERNIA REPAIR      HYSTERECTOMY      TTAH-BSO age 28    LAMINECTOMY      LUMBAR LAMINECTOMY      OOPHORECTOMY      age 28    DC ARTHRODESIS POSTERIOR/PSTLAT TQ 1NTRSPC THORACIC N/A 06/04/2018    Procedure: Reopening of lumbar incision for T12-L5 posterior instrumented fixation and fusion and T12-L4 posterior decompression;  Surgeon: Wood Rogel MD;  Location: [facility];  Service: Neurosurgery    DC COLONOSCOPY FLX DX W/COLLJ SPEC WHEN PFRMD N/A 03/02/2016    Procedure: EGD AND COLONOSCOPY;  Surgeon: Mylene Moeller MD;  Location: [facility];  Service: Gastroenterology    DC DILATION ESOPH UNGUIDED SOUND/BOUGIE 1/MULT PASS N/A 09/28/2016    Procedure: DILATATION ESOPHAGEAL;  Surgeon: Mylene Moeller MD;  Location: [facility];  Service: Gastroenterology    DC ESOPHAGOGASTRODUODENOSCOPY TRANSORAL DIAGNOSTIC N/A 07/18/2016    Procedure: ESOPHAGOGASTRODUODENOSCOPY (EGD);  Surgeon: Mylene Moeller MD;  Location: [facility];  Service: Gastroenterology    DC INSJ/RPLCMT SPINAL NPG/RCVR POCKET CRTJ&CONNJ Left 06/04/2018    Procedure: removal of left buttock implantable pulse generator and placement of new  implantable pulse generator;  Surgeon: Wood  "MD Juan F;  Location: [facility];  Service: Neurosurgery           01/21/25 1033   Note Type   Note type Evaluation   Pain Assessment   Pain Assessment Tool FLACC   Pain Rating: FLACC (Rest) - Face 0   Pain Rating: FLACC (Rest) - Legs 0   Pain Rating: FLACC (Rest) - Activity 0   Pain Rating: FLACC (Rest) - Cry 0   Pain Rating: FLACC (Rest) - Consolability 0   Score: FLACC (Rest) 0   Restrictions/Precautions   Weight Bearing Precautions Per Order No   Other Precautions Contact/isolation;Chair Alarm;Bed Alarm;Fall Risk;O2;Multiple lines   Home Living   Type of Home Assisted living  ([facility])   Bathroom Shower/Tub Walk-in shower   Bathroom Toilet Raised   Bathroom Equipment Grab bars in shower;Grab bars around toilet   Home Equipment Walker   Prior Function   Level of [place] Independent with ADLs;Independent with functional mobility  (assistance with showers)   Lives With Facility staff   Falls in the last 6 months 0   Lifestyle   Autonomy PTA pt states independence with her ADLs, transfers, ambulation--with RW; member of senior-life(3xwk); has assistance with showers   Reciprocal Relationships children   Service to Others worked as a    Intrinsic Gratification watching TV   Subjective   Subjective \"I wanted to walk more here in the hospital.\"   ADL   Where Assessed Chair   Eating Assistance 6  Modified independent   Grooming Assistance 6  Modified Independent   UB Bathing Assistance 5  Supervision/Setup   LB Bathing Assistance 4  Minimal Assistance   UB Dressing Assistance 5  Supervision/Setup   LB Dressing Assistance 4  Minimal Assistance   Transfers   Sit to Stand 5  Supervision   Additional items Increased time required;Verbal cues   Stand to Sit 5  Supervision   Additional items Increased time required;Verbal cues   Functional Mobility   Functional Mobility 5  Supervision   Additional items Rolling walker   Balance   Static Sitting Good   Dynamic Sitting Fair +   Static Standing Fair   Dynamic " Standing Fair -   Activity Tolerance   Activity Tolerance Patient limited by fatigue   Medical Staff Made Aware nsg   RUE Assessment   RUE Assessment WFL   RUE Strength   RUE Overall Strength Within Functional Limits - able to perform ADL tasks with strength  (4/5 throughout)   LUE Assessment   LUE Assessment WFL   LUE Strength   LUE Overall Strength Within Functional Limits - able to perform ADL tasks with strength  (4/5 throughout)   Hand Function   Gross Motor Coordination Functional   Fine Motor Coordination Functional   Sensation   Light Touch No apparent deficits   Proprioception   Proprioception No apparent deficits   Vision-Basic Assessment   Current Vision   (glasses)   Vision - Complex Assessment   Acuity Able to read clock/calendar on wall without difficulty   Psychosocial   Psychosocial (WDL) WDL   Perception   Inattention/Neglect Appears intact   Cognition   Overall Cognitive Status WFL   Arousal/Participation Alert   Attention Within functional limits   Orientation Level Oriented X4   Memory Within functional limits   Following Commands Follows all commands and directions without difficulty   Assessment   Limitation Decreased ADL status;Decreased UE strength;Decreased endurance;Decreased high-level ADLs   Prognosis Good   Assessment Pt is a 78y/o female admitted to the hospital 2* noted LE swelling. Pt noted with CHF. Pt with PMH SADAF, anemia, basal cell Ca, fibromyalgia, HTN, IBS, spinal stenosis, CVA, lumbar sx, and recent(11/24) L patella fx. PTA pt states independence with her ADLs, transfers, ambulation--with RW; member of [employer](3xwk); has assistance with showers. During initial eval, pt demonstrated slight deficits with her functional mobility, ADL status, transfer safety, b/l UE strength, activity tolerance, and functional balance. Pt would benefit from continued OT tx for the above deficits.2-5xwk/1-2wks. The patient's raw score on the AM-PAC Daily Activity Inpatient Short Form is 22. A  "raw score of greater than or equal to 19 suggests the patient may benefit from discharge to home. Please refer to the recommendation of the Occupational Therapist for safe discharge planning.   Goals   Patient Goals \"to walk more\"   STG Time Frame   (1-5 days)   Short Term Goal #1 Pt will demonstrate proper walker/transfer safety 100% of the time.   Short Term Goal #2 Pt will demonstrate improved activity tolerance to good(20-30mins) and standing tolerance to 3-5mins to assist with ADLs.   Short Term Goal  Pt will demonstrate mod I with their sit-stand transfers to assist with completion of their LE dressing.   LTG Time Frame   (5-10days)   Long Term Goal #1 Pt will demonstrate g/g- balance with all functional activities.   Long Term Goal #2 Pt will demonstrate mod I with their UE and LE bathing/dresssing.   Long Term Goal Pt will demonstrate improved b/l UE strength by 1/2 MM grade to assist with ADLs/transfers.   Plan   Treatment Interventions ADL retraining;Functional transfer training;UE strengthening/ROM;Endurance training;Patient/family training;Equipment evaluation/education;Compensatory technique education   Goal Expiration Date 01/31/25   OT Treatment Day 0   OT Frequency   (2-5xwk/1-2wks)   Discharge Recommendation   Rehab Resource Intensity Level, OT III (Minimum Resource Intensity)  ([facility]OT at [facility])   AM-PAC Daily Activity Inpatient   Lower Body Dressing 3   Bathing 3   Toileting 4   Upper Body Dressing 4   Grooming 4   Eating 4   Daily Activity Raw Score 22   Daily Activity Standardized Score (Calc for Raw Score >=11) 47.1   AM-PAC Applied Cognition Inpatient   Following a Speech/Presentation 4   Understanding Ordinary Conversation 4   Taking Medications 4   Remembering Where Things Are Placed or Put Away 4   Remembering List of 4-5 Errands 4   Taking Care of Complicated Tasks 4   Applied Cognition Raw Score 24   Applied Cognition Standardized Score 62.21   Rob Haque         "

## 2025-01-21 NOTE — PROGRESS NOTES
Progress Note - Hospitalist   Name: Mattie Varela 79 y.o. female I MRN: [id number]  Unit/Bed#: E5 -01 I Date of Admission: 1/19/2025   Date of Service: 1/21/2025 I Hospital Day: 1    Assessment & Plan  Acute on chronic diastolic congestive heart failure (HCC)  Wt Readings from Last 3 Encounters:   01/21/25 78.2 kg (172 lb 6.4 oz)   12/20/24 81.2 kg (179 lb)   12/02/24 81.3 kg (179 lb 3.7 oz)     Presented with increasing weight, bilateral lower extremity edema and decreased urine output via EMS  PTA on torsemide 30 mg daily, notes she follows a sodium fluid restricted diet at [facility] her [facility]  TTE 11/2024 reviewed LVEF 51%, unable to assess diastolic function  Chest x-ray clear although BNP elevated at 222 and patient with hypervolemic hyponatremia  With increasing weight, dyspnea, lower extremity edema. CXR reviewed no acute cardiopulmonary disease  Continue monitoring intake and output, daily weights  Start low-sodium diet, continue fluid restriction  S/p IV diuresis, now transition to p.o. by cardiology  Monitor weights  Acute respiratory failure with hypoxia (HCC)  Currently requiring 2 L nasal cannula to maintain above 88%  Pt reports she is not on oxygen at home  Chest x-ray clear without evidence of pneumonia  Transition to oral  Stage 3b chronic kidney disease (HCC)  Lab Results   Component Value Date    EGFR 34 01/21/2025    EGFR 36 01/20/2025    EGFR 36 01/19/2025    CREATININE 1.42 (H) 01/21/2025    CREATININE 1.37 (H) 01/20/2025    CREATININE 1.36 (H) 01/19/2025     Baseline creatinine around 1.3-1.5, remains at baseline, monitor with diuresis  Type 2 diabetes mellitus with other skin complications (HCC)  Lab Results   Component Value Date    HGBA1C 7.4 (H) 10/12/2024     Recent Labs     01/20/25  1537 01/20/25  2036 01/21/25  0816 01/21/25  1138   POCGLU 126 145* 137 179*     Holding home metformin, A1c near goal  Continue sliding scale insulin, start level 3 diabetic diet  Urinary  retention  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  Draining yellow urine.   It is unclear if patient was started on antibiotic at [facility], will discuss with CM to obtain med list  Continue outpatient follow-up with urology  Maintain urogenital care  Anemia in stage 3b chronic kidney disease  (HCC)  Baseline hemoglobin around 9-10, stable at 8.9  No she had a bowel movement 2 days ago that was not dark or bloody  No gross signs of bleeding on exam  Update iron panel  Hyponatremia  Hypervolemic hyponatremia suspect in setting of volume overload, with appropriate rise over 24 hours and now resolved  Monitor closely with diuretics  Chronic pain disorder  With history of chronic pain disorder status post spinal stimulator with degenerative lumbar spinal stenosis, was transitioned to oral hydromorphone and now is on morphine sulfate  Per PDMP patient is on morphine sulfate 15 mg every 6 hours for severe pain, last filled 1/13/2025  Patient is also on fentanyl 50 mcg an hour patch every third day.  Last applied 1/16/2025, will change today  Continue muscle relaxer with Robaxin as needed, gabapentin qhs prn, tylenol scheduled  Glaucoma  Continue home eyedrops  Hypothyroidism  Continue levothyroxine, check TSH  Patella fracture  With history of patella fracture after she fell at rehab in November  Recently saw orthopedics in December 2024  Cleared to ambulate with walker at baseline  PT OT ladanal's  Insomnia  Previously taken off Ambien, continue melatonin prn qhs  Gastro-esophageal reflux disease without esophagitis  Patient had an ulcer in 2022, denies any dark or bloody stools.  Does deal with some reflux and epigastric discomfort from time to time.  Continue Pepcid and Protonix  Constipation  Narcotic induced, continue home bowel  regimen  Last noted bowel movement 2 days ago, not dark or bloody  Monitor output  Chronic obstructive pulmonary disease, unspecified COPD type (HCC)  With outpatient diagnosis of COPD, unclear severity.  No evidence exacerbation. Continue albuterol prn  Checking viral panel    VTE Pharmacologic Prophylaxis: VTE Score: 4 Moderate Risk (Score 3-4) - Pharmacological DVT Prophylaxis Ordered: heparin.    Mobility:   Basic Mobility Inpatient Raw Score: 20  JH-HLM Goal: 6: Walk 10 steps or more  JH-HLM Achieved: 4: Move to chair/commode  JH-HLM Goal NOT achieved. Continue with multidisciplinary rounding and encourage appropriate mobility to improve upon JH-HLM goals.    Patient Centered Rounds: I performed bedside rounds with nursing staff today.   Discussions with Specialists or Other Care Team Provider: Cardiology    Education and Discussions with Family / Patient: Updated  (daughter) via phone.    Current Length of Stay: 1 day(s)  Current Patient Status: Inpatient   Certification Statement: The patient will continue to require additional inpatient hospital stay due to diuresis  Discharge Plan: Anticipate discharge tomorrow to assisted living facility    Code Status: Level 1 - Full Code    Subjective   Patient had an episode of dyspnea this morning while on room air, placed back on oxygen for comfort.    Objective :  Temp:  [97.7 °F (36.5 °C)-98.7 °F (37.1 °C)] 97.7 °F (36.5 °C)  HR:  [] 104  BP: (103-137)/(57-85) 137/85  Resp:  [16-20] 18  SpO2:  [95 %-96 %] 95 %  O2 Device: Nasal cannula  Nasal Cannula O2 Flow Rate (L/min):  [2 L/min] 2 L/min    Body mass index is 33.67 kg/m².     Input and Output Summary (last 24 hours):     Intake/Output Summary (Last 24 hours) at 1/21/2025 1436  Last data filed at 1/21/2025 1300  Gross per 24 hour   Intake 390 ml   Output 450 ml   Net -60 ml       Physical Exam  Vitals and nursing note reviewed.   Constitutional:       Appearance: Normal appearance.   HENT:       Head: Normocephalic and atraumatic.      Mouth/Throat:      Mouth: Mucous membranes are moist.      Pharynx: Oropharynx is clear. No oropharyngeal exudate.   Eyes:      Extraocular Movements: Extraocular movements intact.   Cardiovascular:      Rate and Rhythm: Normal rate and regular rhythm.      Pulses: Normal pulses.      Heart sounds: Normal heart sounds. No murmur heard.     No friction rub. No gallop.   Pulmonary:      Effort: Pulmonary effort is normal. No respiratory distress.      Breath sounds: Normal breath sounds. No stridor. No wheezing or rales.   Abdominal:      General: Abdomen is flat. Bowel sounds are normal. There is no distension.      Palpations: Abdomen is soft.      Tenderness: There is no abdominal tenderness.   Musculoskeletal:      Right lower leg: Edema present.      Left lower leg: Edema present.   Skin:     General: Skin is warm and dry.   Neurological:      General: No focal deficit present.      Mental Status: She is alert and oriented to person, place, and time.           Lines/Drains:  Lines/Drains/Airways       Active Status       Name Placement date Placement time Site Days    Urethral Catheter 16 Fr. 10/26/24  1311  --  87                  Urinary Catheter:  Goal for removal: N/A- Discharging with Bateman                 Lab Results: I have reviewed the following results:   Results from last 7 days   Lab Units 01/21/25  0559 01/20/25  0435   WBC Thousand/uL 6.16 6.92   HEMOGLOBIN g/dL 9.1* 8.9*   HEMATOCRIT % 30.1* 29.4*   PLATELETS Thousands/uL 294 343   SEGS PCT %  --  64   LYMPHO PCT %  --  19   MONO PCT %  --  11   EOS PCT %  --  6     Results from last 7 days   Lab Units 01/21/25  0559 01/20/25  0435   SODIUM mmol/L 138 135   POTASSIUM mmol/L 4.0 4.2   CHLORIDE mmol/L 91* 90*   CO2 mmol/L 42* 35*   BUN mg/dL 20 22   CREATININE mg/dL 1.42* 1.37*   ANION GAP mmol/L 5 10   CALCIUM mg/dL 8.9 8.8   ALBUMIN g/dL  --  3.5   TOTAL BILIRUBIN mg/dL  --  0.32   ALK PHOS U/L  --  101   ALT  U/L  --  8   AST U/L  --  15   GLUCOSE RANDOM mg/dL 109 107         Results from last 7 days   Lab Units 01/21/25  1138 01/21/25  0816 01/20/25  2036 01/20/25  1537 01/20/25  1227 01/20/25  0659 01/19/25  2117 01/19/25  1614 01/19/25  1141 01/19/25  0902   POC GLUCOSE mg/dl 179* 137 145* 126 196* 129 126 146* 109 114               Recent Cultures (last 7 days):         Imaging Results Review: No pertinent imaging studies reviewed.  Other Study Results Review: No additional pertinent studies reviewed.    Last 24 Hours Medication List:     Current Facility-Administered Medications:     acetaminophen (TYLENOL) tablet 975 mg, Q8H ARY    albuterol (PROVENTIL HFA,VENTOLIN HFA) inhaler 2 puff, Q6H PRN    aluminum-magnesium hydroxide-simethicone (MAALOX) oral suspension 30 mL, Q6H PRN    aspirin chewable tablet 81 mg, Daily    atorvastatin (LIPITOR) tablet 40 mg, Daily With Dinner    docusate sodium (COLACE) capsule 100 mg, BID    famotidine (PEPCID) tablet 20 mg, Daily    fentaNYL (DURAGESIC) 50 mcg/hr TD 72 hr patch 1 patch, Q72H    ferrous sulfate tablet 325 mg, Daily With Breakfast    fluticasone (FLONASE) 50 mcg/act nasal spray 2 spray, Daily    gabapentin (NEURONTIN) capsule 300 mg, HS    guaiFENesin (MUCINEX) 12 hr tablet 600 mg, Q12H ARY    heparin (porcine) subcutaneous injection 5,000 Units, Q8H ARY    insulin lispro (HumALOG/ADMELOG) 100 units/mL subcutaneous injection 1-6 Units, 4x Daily (AC & HS) **AND** Fingerstick Glucose (POCT), 4x Daily AC and at bedtime    latanoprost (XALATAN) 0.005 % ophthalmic solution 1 drop, HS    levothyroxine tablet 37.5 mcg, Early Morning    lidocaine (LIDODERM) 5 % patch 1 patch, Daily    magnesium Oxide (MAG-OX) tablet 400 mg, BID    melatonin tablet 3 mg, HS PRN    methocarbamol (ROBAXIN) tablet 750 mg, Q8H PRN    metoprolol succinate (TOPROL-XL) 24 hr tablet 12.5 mg, Daily    morphine (MSIR) IR tablet 15 mg, Q6H PRN    nystatin (MYCOSTATIN) powder, BID    ondansetron (ZOFRAN)  injection 4 mg, Q6H PRN    pantoprazole (PROTONIX) EC tablet 40 mg, Daily    polyethylene glycol (MIRALAX) packet 17 g, Daily PRN    polyethylene glycol (MIRALAX) packet 17 g, Daily    senna (SENOKOT) tablet 17.2 mg, HS    torsemide (DEMADEX) tablet 30 mg, BID (diuretic)    white petrolatum-mineral oil (EUCERIN,HYDROCERIN) cream, Daily    Administrative Statements   Today, Patient Was Seen By: Naveen Ramon PA-C      **Please Note: This note may have been constructed using a voice recognition system.**

## 2025-01-21 NOTE — ASSESSMENT & PLAN NOTE
-- Hx of detrusor overactivity and urinary retention with spine stimulator in place, follows outpatient urology for injections  -- Bateman catheter in place due to retention

## 2025-01-21 NOTE — PLAN OF CARE
Problem: PAIN - ADULT  Goal: Verbalizes/displays adequate comfort level or baseline comfort level  Description: Interventions:  - Encourage patient to monitor pain and request assistance  - Assess pain using appropriate pain scale  - Administer analgesics based on type and severity of pain and evaluate response  - Implement non-pharmacological measures as appropriate and evaluate response  - Consider cultural and social influences on pain and pain management  - Notify physician/advanced practitioner if interventions unsuccessful or patient reports new pain  Outcome: Progressing     Problem: INFECTION - ADULT  Goal: Absence or prevention of progression during hospitalization  Description: INTERVENTIONS:  - Assess and monitor for signs and symptoms of infection  - Monitor lab/diagnostic results  - Monitor all insertion sites, i.e. indwelling lines, tubes, and drains  - Monitor endotracheal if appropriate and nasal secretions for changes in amount and color  - [place] appropriate cooling/warming therapies per order  - Administer medications as ordered  - Instruct and encourage patient and family to use good hand hygiene technique  - Identify and instruct in appropriate isolation precautions for identified infection/condition  Outcome: Progressing  Goal: Absence of fever/infection during neutropenic period  Description: INTERVENTIONS:  - Monitor WBC    Outcome: Progressing     Problem: SAFETY ADULT  Goal: Patient will remain free of falls  Description: INTERVENTIONS:  - Educate patient/family on patient safety including physical limitations  - Instruct patient to call for assistance with activity   - Consult OT/PT to assist with strengthening/mobility   - Keep Call bell within reach  - Keep bed low and locked with side rails adjusted as appropriate  - Keep care items and personal belongings within reach  - Initiate and maintain comfort rounds  - Make Fall Risk Sign visible to staff  - Apply yellow socks and bracelet  for high fall risk patients  - Consider moving patient to room near nurses station  Outcome: Progressing  Goal: Maintain or return to baseline ADL function  Description: INTERVENTIONS:  -  Assess patient's ability to carry out ADLs; assess patient's baseline for ADL function and identify physical deficits which impact ability to perform ADLs (bathing, care of mouth/teeth, toileting, grooming, dressing, etc.)  - Assess/evaluate cause of self-care deficits   - Assess range of motion  - Assess patient's mobility; develop plan if impaired  - Assess patient's need for assistive devices and provide as appropriate  - Encourage maximum independence but intervene and supervise when necessary  - Involve family in performance of ADLs  - Assess for home care needs following discharge   - Consider OT consult to assist with ADL evaluation and planning for discharge  - Provide patient education as appropriate  Outcome: Progressing  Goal: Maintains/Returns to pre admission functional level  Description: INTERVENTIONS:  - Perform AM-PAC 6 Click Basic Mobility/ Daily Activity assessment daily.  - Set and communicate daily mobility goal to care team and patient/family/caregiver.   - Collaborate with rehabilitation services on mobility goals if consulted  - Out of bed for toileting  - Record patient progress and toleration of activity level   Outcome: Progressing     Problem: DISCHARGE PLANNING  Goal: Discharge to home or other facility with appropriate resources  Description: INTERVENTIONS:  - Identify barriers to discharge w/patient and caregiver  - Arrange for needed discharge resources and transportation as appropriate  - Identify discharge learning needs (meds, wound care, etc.)  - Arrange for interpretive services to assist at discharge as needed  - Refer to Case Management Department for coordinating discharge planning if the patient needs post-hospital services based on physician/advanced practitioner order or complex needs  related to functional status, cognitive ability, or social support system  Outcome: Progressing     Problem: Knowledge Deficit  Goal: Patient/family/caregiver demonstrates understanding of disease process, treatment plan, medications, and discharge instructions  Description: Complete learning assessment and assess knowledge base.  Interventions:  - Provide teaching at level of understanding  - Provide teaching via preferred learning methods  Outcome: Progressing     Problem: RESPIRATORY - ADULT  Goal: Achieves optimal ventilation and oxygenation  Description: INTERVENTIONS:  - Assess for changes in respiratory status  - Assess for changes in mentation and behavior  - Position to facilitate oxygenation and minimize respiratory effort  - Oxygen administered by appropriate delivery if ordered  - Initiate smoking cessation education as indicated  - Encourage broncho-pulmonary hygiene including cough, deep breathe, Incentive Spirometry  - Assess the need for suctioning and aspirate as needed  - Assess and instruct to report SOB or any respiratory difficulty  - Respiratory Therapy support as indicated  Outcome: Progressing     Problem: SKIN/TISSUE INTEGRITY - ADULT  Goal: Skin Integrity remains intact(Skin Breakdown Prevention)  Description: Assess:  -Perform Jack assessment every   -Clean and moisturize skin every   -Inspect skin when repositioning, toileting, and assisting with ADLS  -Assess under medical devices such as  every   -Assess extremities for adequate circulation and sensation     Bed Management:  -Have minimal linens on bed & keep smooth, unwrinkled  -Change linens as needed when moist or perspiring  -Avoid sitting or lying in one position for more than  hours while in bed  -Keep HOB at degrees     Toileting:  -Offer bedside commode  -Assess for incontinence every   -Use incontinent care products after each incontinent episode such as wipes and pads and hydraguard cream    Activity:  -Mobilize patient   times a day  -Encourage activity and walks on unit  -Encourage or provide ROM exercises   -Turn and reposition patient every  Hours  -Use appropriate equipment to lift or move patient in bed  -Instruct/ Assist with weight shifting every  when out of bed in chair  -Consider limitation of chair time  hour intervals    Skin Care:  -Avoid use of baby powder, tape, friction and shearing, hot water or constrictive clothing  -Relieve pressure over bony prominences using   -Do not massage red bony areas    Next Steps:  -Teach patient strategies to minimize risks such as    -Consider consults to  interdisciplinary teams such as   Outcome: Progressing     Problem: Prexisting or High Potential for Compromised Skin Integrity  Goal: Skin integrity is maintained or improved  Description: INTERVENTIONS:  - Identify patients at risk for skin breakdown  - Assess and monitor skin integrity  - Assess and monitor nutrition and hydration status  - Monitor labs   - Assess for incontinence   - Turn and reposition patient  - Assist with mobility/ambulation  - Relieve pressure over bony prominences  - Avoid friction and shearing  - Provide appropriate hygiene as needed including keeping skin clean and dry  - Evaluate need for skin moisturizer/barrier cream  - Collaborate with interdisciplinary team   - Patient/family teaching  - Consider wound care consult   Outcome: Progressing

## 2025-01-21 NOTE — ASSESSMENT & PLAN NOTE
-- Stage IIIb chronic kidney disease.  Baseline creatinine suspected to be around 1.2-1.5  -- History chronic urinary retention overall renal function stable.    -- Has chronic urinary retention.

## 2025-01-21 NOTE — PLAN OF CARE
Problem: OCCUPATIONAL THERAPY ADULT  Goal: Performs self-care activities at highest level of function for planned discharge setting.  See evaluation for individualized goals.  Description: Treatment Interventions: ADL retraining, Functional transfer training, UE strengthening/ROM, Endurance training, Patient/family training, Equipment evaluation/education, Compensatory technique education          See flowsheet documentation for full assessment, interventions and recommendations.   Note: Limitation: Decreased ADL status, Decreased UE strength, Decreased endurance, Decreased high-level ADLs  Prognosis: Good  Assessment: Pt is a 78y/o female admitted to the hospital 2* noted LE swelling. Pt noted with CHF. Pt with PMH SADAF, anemia, basal cell Ca, fibromyalgia, HTN, IBS, spinal stenosis, CVA, lumbar sx, and recent(11/24) L patella fx. PTA pt states independence with her ADLs, transfers, ambulation--with RW; member of senior-life(3xwk); has assistance with showers. During initial eval, pt demonstrated slight deficits with her functional mobility, ADL status, transfer safety, b/l UE strength, activity tolerance, and functional balance. Pt would benefit from continued OT tx for the above deficits.2-5xwk/1-2wks. The patient's raw score on the AM-PAC Daily Activity Inpatient Short Form is 22. A raw score of greater than or equal to 19 suggests the patient may benefit from discharge to home. Please refer to the recommendation of the Occupational Therapist for safe discharge planning.     Rehab Resource Intensity Level, OT: III (Minimum Resource Intensity) (PT/OT at [facility])

## 2025-01-21 NOTE — ASSESSMENT & PLAN NOTE
[facility] Call Center    Phone Message    May a detailed message be left on voicemail: yes     Reason for Call: Pt stated he received a call to schedule MRI before 3/28 appt but pt was unable to schedule until 3/29. Pt wondering if he needs to reschedule. He's wondering if Dr Escobedo needs results before appointment.  Please call pt to discuss Thank you    Action Taken: Message routed to:  Other: Uro    Travel Screening: Not Applicable                                                                     With history of patella fracture after she fell at rehab in November  Recently saw orthopedics in December 2024  Cleared to ambulate with walker at baseline  PT KEATON alonzo

## 2025-01-21 NOTE — UTILIZATION REVIEW
Continued Stay Review  SEE INITIAL REVIEW AT BOTTOM   Date: 1/21                           Current Patient Class: Inpatient  Current Level of Care: MS    HPI:79 y.o. female initially admitted on 1/19 as OBS converted to IP 1/20      Assessment/Plan: Date: 1/21   Day 3: Has surpassed a 2nd midnight with active treatments and services.   Acute on chronic CHF . Cardiology following .   Pt had an episode of dyspnea this morning while on room air, placed back on oxygen for comfort. Breath sounds diminished, crackles per nsg  O2 sat 95 % at rest.   +2 edema BLE .On IV Lasix 50 mg BID . Per Cardiology can transition to po diuresis with torsemide 30 mg BID, follow urine output . Daily weights . Renal  function,  electrolytes .   Has ahn cath in place . UOP 1600 ml last 24 hrs. I/O -3806 since admission . Wt  down 16 lbs from yesterday .Creat 1.42 today from 1.37.  Hyponatremia resolved . Pt c/o 10/10 generalized pain /back pain .  Await PT/OT evals .   Rehab Resource Intensity Level, OT: III (Minimum Resource Intensity) (PT/OT at [facility])     Medications:   Scheduled Medications:  acetaminophen, 975 mg, Oral, Q8H ARY  aspirin, 81 mg, Oral, Daily  atorvastatin, 40 mg, Oral, Daily With Dinner  docusate sodium, 100 mg, Oral, BID  famotidine, 20 mg, Oral, Daily  fentaNYL, 1 patch, Transdermal, Q72H  ferrous sulfate, 325 mg, Oral, Daily With Breakfast  fluticasone, 2 spray, Each Nare, Daily  furosemide, 50 mg, Intravenous, BID   End: 01/21/25 1245   gabapentin, 300 mg, Oral, HS  guaiFENesin, 600 mg, Oral, Q12H ARY  heparin (porcine), 5,000 Units, Subcutaneous, Q8H ARY  insulin lispro, 1-6 Units, Subcutaneous, 4x Daily (AC & HS)  latanoprost, 1 drop, Both Eyes, HS  levothyroxine, 37.5 mcg, Oral, Early Morning  lidocaine, 1 patch, Topical, Daily  magnesium Oxide, 400 mg, Oral, BID  metoprolol succinate, 12.5 mg, Oral, Daily  nystatin, , Topical, BID  pantoprazole, 40 mg, Oral, Daily  polyethylene glycol, 17 g, Oral, Daily  senna,  17.2 mg, Oral, HS  white petrolatum-mineral oil, , Topical, Daily    torsemide (DEMADEX) tablet 30 mg  Dose: 30 mg  Freq: 2 times daily (diuretic) Route: PO  Start: 01/21/25 1600        Continuous IV Infusions:     PRN Meds:  albuterol, 2 puff, Inhalation, Q6H PRN  aluminum-magnesium hydroxide-simethicone, 30 mL, Oral, Q6H PRN  melatonin, 3 mg, Oral, HS PRN  methocarbamol, 750 mg, Oral, Q8H PRN x1 1/21   morphine, 15 mg, Oral, Q6H PRN x2 1/20, x 1 1/21   ondansetron, 4 mg, Intravenous, Q6H PRN x1 1/20   polyethylene glycol, 17 g, Oral, Daily PRN      Discharge Plan: TBD    Vital Signs (last 3 days)       Date/Time Temp Pulse Resp BP MAP (mmHg) SpO2 Calculated FIO2 (%) - Nasal Cannula Nasal Cannula O2 Flow Rate (L/min) O2 Device Patient Position - Orthostatic VS Pain    01/21/25 0738 -- -- -- -- -- -- -- -- -- -- 10 - Worst Possible Pain    01/21/25 0735 -- -- -- -- -- -- -- -- -- -- 10 - Worst Possible Pain    01/21/25 0734 97.7 °F (36.5 °C) 104 18 -- -- 95 % -- -- -- Sitting --    01/21/25 0730 -- -- -- 137/85 102 -- -- -- -- -- --    01/21/25 0601 -- -- -- -- -- -- -- -- -- -- 2    01/20/25 2300 98.7 °F (37.1 °C) 76 20 -- -- 96 % 28 2 L/min Nasal cannula Sitting --    01/20/25 22:57:40 -- -- 20 103/62 76 -- -- -- -- -- --    01/20/25 2206 -- -- -- -- -- 95 % 28 2 L/min Nasal cannula -- --    01/20/25 2100 -- -- -- -- -- -- -- -- -- -- 5    01/20/25 15:29:59 -- -- 16 133/57 82 -- 28 2 L/min Nasal cannula Sitting --    01/20/25 1414 -- -- -- -- -- -- -- -- -- -- 10 - Worst Possible Pain    01/20/25 1017 -- -- -- -- -- -- -- -- -- -- 6    01/20/25 0730 -- -- -- -- -- -- -- -- -- -- No Pain          Weight (last 2 days)       Date/Time Weight    01/21/25 0600 78.2 (172.4)    01/21/25 0559 78.2 (172.4)    01/20/25 0600 85.4 (188.27)    01/19/25 0324 85.2 (187.83)            Pertinent Labs/Diagnostic Results:         Results from last 7 days   Lab Units 01/20/25  1032   SARS-COV-2  Negative     Results from last 7 days  "  Lab Units 01/21/25  0559 01/20/25  0435 01/19/25  0347   WBC Thousand/uL 6.16 6.92 6.92   HEMOGLOBIN g/dL 9.1* 8.9* 8.7*   HEMATOCRIT % 30.1* 29.4* 28.6*   PLATELETS Thousands/uL 294 343 317   TOTAL NEUT ABS Thousands/µL  --  4.46 4.19         Results from last 7 days   Lab Units 01/21/25  0559 01/20/25  0435 01/19/25  0347   SODIUM mmol/L 138 135 126*   POTASSIUM mmol/L 4.0 4.2 4.2   CHLORIDE mmol/L 91* 90* 85*   CO2 mmol/L 42* 35* 33*   ANION GAP mmol/L 5 10 8   BUN mg/dL 20 22 24   CREATININE mg/dL 1.42* 1.37* 1.36*   EGFR ml/min/1.73sq m 34 36 36   CALCIUM mg/dL 8.9 8.8 8.8   MAGNESIUM mg/dL  --  2.2  --    PHOSPHORUS mg/dL  --  3.5  --      Results from last 7 days   Lab Units 01/20/25  0435 01/19/25  0347   AST U/L 15 14   ALT U/L 8 9   ALK PHOS U/L 101 98   TOTAL PROTEIN g/dL 6.5 6.8   ALBUMIN g/dL 3.5 3.6   TOTAL BILIRUBIN mg/dL 0.32 0.44     Results from last 7 days   Lab Units 01/21/25  1138 01/21/25  0816 01/20/25  2036 01/20/25  1537 01/20/25  1227 01/20/25  0659 01/19/25  2117 01/19/25  1614 01/19/25  1141 01/19/25  0902   POC GLUCOSE mg/dl 179* 137 145* 126 196* 129 126 146* 109 114     Results from last 7 days   Lab Units 01/21/25  0559 01/20/25  0435 01/19/25  0347   GLUCOSE RANDOM mg/dL 109 107 120             No results found for: \"BETA-HYDROXYBUTYRATE\"       Results from last 7 days   Lab Units 01/19/25  0347   PH YOVANI  7.394   PCO2 YOVANI mm Hg 49.8   PO2 YOVANI mm Hg 27.3*   HCO3 YOVANI mmol/L 29.7   BASE EXC YOVANI mmol/L 4.2   O2 CONTENT YOVANI ml/dL 6.4   O2 HGB, VENOUS % 48.6*             Results from last 7 days   Lab Units 01/19/25  0757 01/19/25  0553 01/19/25  0347   HS TNI 0HR ng/L  --   --  8   HS TNI 2HR ng/L  --  7  --    HSTNI D2 ng/L  --  -1  --    HS TNI 4HR ng/L 7  --   --    HSTNI D4 ng/L -1  --   --              Results from last 7 days   Lab Units 01/20/25  0435   TSH 3RD GENERATON uIU/mL 1.428                     Results from last 7 days   Lab Units 01/19/25  0347   BNP pg/mL 222* "     Results from last 7 days   Lab Units 01/20/25  0435   FERRITIN ng/mL 120   IRON SATURATION % 22   IRON ug/dL 63   TIBC ug/dL 284.2     Results from last 7 days   Lab Units 01/20/25  0435   TRANSFERRIN mg/dL 203               Results from last 7 days   Lab Units 01/20/25  1032   INFLUENZA A PCR  Negative   INFLUENZA B PCR  Negative   RSV PCR  Negative                 Network Utilization Review Department  ATTENTION: Please call with any questions or concerns to [phone] and carefully listen to the prompts so that you are directed to the right person. All voicemails are confidential.   For Discharge needs, contact Care Management DC Support Team at [phone] opt. 2  Send all requests for admission clinical reviews, approved or denied determinations and any other requests to dedicated fax number below belonging to the campus where the patient is receiving treatment. List of dedicated fax numbers for the Facilities:  FACILITY NAME UR FAX NUMBER   ADMISSION DENIALS (Administrative/Medical Necessity) [phone]   DISCHARGE SUPPORT TEAM (NETWORK) [phone]   PARENT CHILD HEALTH (Maternity/NICU/Pediatrics) [phone]   [facility] [phone]   [facility] [phone]   [facility] [phone]   [facility] [phone]   [facility] [phone]   [facility] [phone]   [facility] [phone]   [facility] [phone]   [facility] [phone]   [facility] [phone]   [facility] [phone]   [facility] [phone]

## 2025-01-21 NOTE — ASSESSMENT & PLAN NOTE
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  -- TTE 5/23/2024: LVEF 70%, grade 1 diastolic dysfunction, mild LVOT obstruction, moderate mitral annual calcification, mild tricuspid regurgitation.  -- Pharmacotherapy:  - Prehospital diuretic: Torsemide 30 mg twice daily with additional 20 mg as needed  - Prehospital beta-blocker: Toprol 12.5 mg daily  TODAY (01/21/25):   Standing weight today 172 pounds  Urine output yesterday 1.6 L (daily net -1.3 L), cumulative net since admission -2.7 L  Chemistry today: Sodium 138, potassium 4.0, CO2 42, BUN 20, creatinine 1.42  Still with signs of dependent tissue edema (thighs and distal lower limbs) with uncertain baseline but clear lungs and now with some mild uptrend in creatinine and CO2 suspect she is no longer intravascularly volume overload ~~> will resume oral diuretic closely following effect on urine output, exam, Sx, oxygenation and chemistry.  Begin torsemide 30 mg twice daily (previously prescribed this but seemingly taking less than that prior to admission)

## 2025-01-21 NOTE — PROGRESS NOTES
Progress Note - Cardiology   Name: Mattie Varela 79 y.o. female I MRN: [id number]  Unit/Bed#: E5 -01 I Date of Admission: 1/19/2025   Date of Service: 1/21/2025 I Hospital Day: 1        Assessment & Plan  Acute on chronic diastolic congestive heart failure (HCC)  -- Limited TTE 11/22/2024: EF 51%, septum is akinetic to paradoxical.  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  -- TTE 5/23/2024: LVEF 70%, grade 1 diastolic dysfunction, mild LVOT obstruction, moderate mitral annual calcification, mild tricuspid regurgitation.  -- Pharmacotherapy:  - Prehospital diuretic: Torsemide 30 mg twice daily with additional 20 mg as needed  - Prehospital beta-blocker: Toprol 12.5 mg daily  TODAY (01/21/25):   Standing weight today 172 pounds  Urine output yesterday 1.6 L (daily net -1.3 L), cumulative net since admission -2.7 L  Chemistry today: Sodium 138, potassium 4.0, CO2 42, BUN 20, creatinine 1.42  Still with signs of dependent tissue edema (thighs and distal lower limbs) with uncertain baseline but clear lungs and now with some mild uptrend in creatinine and CO2 suspect she is no longer intravascularly volume overload ~~> will resume oral diuretic closely following effect on urine output, exam, Sx, oxygenation and chemistry.  Begin torsemide 30 mg twice daily (previously prescribed this but seemingly taking less than that prior to admission)     Hyponatremia  -- Resolved    Stage 3b chronic kidney disease (HCC)  -- Stage IIIb chronic kidney disease.  Baseline creatinine suspected to be around 1.2-1.5  -- History chronic urinary retention overall renal function stable.    -- Has chronic urinary retention.  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  Chronic obstructive pulmonary disease, unspecified COPD type (HCC)  -- Without acute exacerbation  Patella fracture  -- With history of patella fracture after she fell at rehab in November  -- Seen by orthopedics in December 2024  -- Cleared to ambulate with walker at baseline  Chronic pain disorder  -- With history of chronic pain disorder status post spinal stimulator with degenerative lumbar spinal stenosis, was transitioned to oral hydromorphone and now is on morphine sulfate  -- Per PDMP patient is on morphine sulfate 15 mg every 6 hours for severe pain, last filled 1/13/2025  -- Patient is also on fentanyl 50 mcg an hour patch every third day.  Previously applied 1/16/2025 --> changed 1/20/2025  Glaucoma    Hypothyroidism    Acute respiratory failure with hypoxia ([facility])    Constipation    Insomnia    Gastro-esophageal reflux disease without esophagitis          Subjective:  Patient reports some vague cough without expectoration this morning - unclear relation to eating.  Recently returned from ambulation in the halls.  Patient reports tolerating this well with breathing and function likely near recent baseline      Vitals:  Vitals:    01/21/25 0559 01/21/25 0600   Weight: 78.2 kg (172 lb 6.4 oz) 78.2 kg (172 lb 6.4 oz)   ,  Vitals:    01/21/25 0559 01/21/25 0600 01/21/25 0730 01/21/25 0734   BP:   137/85    BP Location:       Pulse:    104   Resp:    18   Temp:    97.7 °F (36.5 °C)   TempSrc:    Tympanic   SpO2:    95%   Weight: 78.2 kg (172 lb 6.4 oz) 78.2 kg (172 lb 6.4 oz)     Height:           Exam:  General:  Alert normally conversant and comfortable-appearing  Heart:  Regular with controlled rate and no pathologic murmur or rub  Lungs:  Clear throughout  Lower Limbs: Tissue edema palpable in the dependent thighs and circumferentially in the distal lower extremities -1+             Medications:    Current  Facility-Administered Medications:     acetaminophen (TYLENOL) tablet 975 mg, 975 mg, Oral, Q8H ARY, Jose Gaines PA-C, 975 mg at 01/21/25 0601    albuterol (PROVENTIL HFA,VENTOLIN HFA) inhaler 2 puff, 2 puff, Inhalation, Q6H PRN, Jose Gaines PA-C    aluminum-magnesium hydroxide-simethicone (MAALOX) oral suspension 30 mL, 30 mL, Oral, Q6H PRN, Brayden Thompson PA-C    aspirin chewable tablet 81 mg, 81 mg, Oral, Daily, Brayden Thompson PA-C, 81 mg at 01/21/25 0922    atorvastatin (LIPITOR) tablet 40 mg, 40 mg, Oral, Daily With Dinner, Brayden Thompson PA-C, 40 mg at 01/20/25 1705    docusate sodium (COLACE) capsule 100 mg, 100 mg, Oral, BID, Jose Gaines PA-C, 100 mg at 01/21/25 0922    famotidine (PEPCID) tablet 20 mg, 20 mg, Oral, Daily, Jose Gaines PA-C, 20 mg at 01/21/25 0922    fentaNYL (DURAGESIC) 50 mcg/hr TD 72 hr patch 1 patch, 1 patch, Transdermal, Q72H, Jose Gaines PA-C, 1 patch at 01/20/25 1017    ferrous sulfate tablet 325 mg, 325 mg, Oral, Daily With Breakfast, Jose Gaines PA-C, 325 mg at 01/21/25 0738    fluticasone (FLONASE) 50 mcg/act nasal spray 2 spray, 2 spray, Each Nare, Daily, Jose Gaines PA-C, 2 spray at 01/21/25 0921    furosemide (LASIX) injection 50 mg, 50 mg, Intravenous, BID, Brayden Thompson PA-C, 50 mg at 01/21/25 0922    gabapentin (NEURONTIN) capsule 300 mg, 300 mg, Oral, HS, Jose Gaines PA-C, 300 mg at 01/20/25 1932    guaiFENesin (MUCINEX) 12 hr tablet 600 mg, 600 mg, Oral, Q12H ARY, Jose Gaines PA-C, 600 mg at 01/21/25 0922    heparin (porcine) subcutaneous injection 5,000 Units, 5,000 Units, Subcutaneous, Q8H [facility], Brayden Thompson PA-C, 5,000 Units at 01/21/25 0601    insulin lispro (HumALOG/ADMELOG) 100 units/mL subcutaneous injection 1-6 Units, 1-6 Units, Subcutaneous, 4x Daily (AC & HS), 1 Units at 01/21/25 1145 **AND** Fingerstick Glucose (POCT), , , 4x Daily AC and at bedtime, Brayden Thompson PA-C    latanoprost (XALATAN) 0.005 % ophthalmic solution 1 drop, 1 drop, Both Eyes, HS,  Jose Gaines PA-C, 1 drop at 01/20/25 2100    levothyroxine tablet 37.5 mcg, 37.5 mcg, Oral, Early Morning, Brayden Thompson PA-C, 37.5 mcg at 01/21/25 0601    lidocaine (LIDODERM) 5 % patch 1 patch, 1 patch, Topical, Daily, Jose Gaines PA-C, 1 patch at 01/21/25 0922    magnesium Oxide (MAG-OX) tablet 400 mg, 400 mg, Oral, BID, Jose Gaines PA-C, 400 mg at 01/21/25 0922    melatonin tablet 3 mg, 3 mg, Oral, HS PRN, Jose Gaines PA-C    methocarbamol (ROBAXIN) tablet 750 mg, 750 mg, Oral, Q8H PRN, Jose Gaines PA-C, 750 mg at 01/21/25 1047    metoprolol succinate (TOPROL-XL) 24 hr tablet 12.5 mg, 12.5 mg, Oral, Daily, Brayden Thompson PA-C, 12.5 mg at 01/21/25 0922    morphine (MSIR) IR tablet 15 mg, 15 mg, Oral, Q6H PRN, Brayden Thompson PA-C, 15 mg at 01/21/25 0738    nystatin (MYCOSTATIN) powder, , Topical, BID, Jose Gaines PA-C, Given at 01/21/25 0922    ondansetron (ZOFRAN) injection 4 mg, 4 mg, Intravenous, Q6H PRN, Brayden Thompson PA-C, 4 mg at 01/20/25 1933    pantoprazole (PROTONIX) EC tablet 40 mg, 40 mg, Oral, Daily, Jsoe Gaines PA-C, 40 mg at 01/21/25 0922    polyethylene glycol (MIRALAX) packet 17 g, 17 g, Oral, Daily PRN, Brayden Thompson PA-C    polyethylene glycol (MIRALAX) packet 17 g, 17 g, Oral, Daily, Jose Gaines PA-C, 17 g at 01/21/25 0922    senna (SENOKOT) tablet 17.2 mg, 17.2 mg, Oral, HS, Jose Gaines PA-C, 17.2 mg at 01/20/25 2100    white petrolatum-mineral oil (EUCERIN,HYDROCERIN) cream, , Topical, Daily, Jose Gaines PA-C, Given at 01/21/25 0931      Labs/Data:        Results from last 7 days   Lab Units 01/21/25  0559 01/20/25  0435 01/19/25  0347   WBC Thousand/uL 6.16 6.92 6.92   HEMOGLOBIN g/dL 9.1* 8.9* 8.7*   HEMATOCRIT % 30.1* 29.4* 28.6*   PLATELETS Thousands/uL 294 343 317     Results from last 7 days   Lab Units 01/21/25  0559 01/20/25  0435 01/19/25  0347   POTASSIUM mmol/L 4.0 4.2 4.2   CHLORIDE mmol/L 91* 90* 85*   CO2 mmol/L 42* 35* 33*   BUN mg/dL 20 22 24   CREATININE mg/dL  1.42* 1.37* 1.36*

## 2025-01-21 NOTE — ASSESSMENT & PLAN NOTE
-- With history of chronic pain disorder status post spinal stimulator with degenerative lumbar spinal stenosis, was transitioned to oral hydromorphone and now is on morphine sulfate  -- Per PDMP patient is on morphine sulfate 15 mg every 6 hours for severe pain, last filled 1/13/2025  -- Patient is also on fentanyl 50 mcg an hour patch every third day.  Previously applied 1/16/2025 --> changed 1/20/2025

## 2025-01-21 NOTE — UTILIZATION REVIEW
NOTIFICATION OF INPATIENT ADMISSION   AUTHORIZATION REQUEST   SERVICING FACILITY:   [place] [address]  Tax ID: [id number]  NPI: [id number] ATTENDING PROVIDER:  Attending Name and NPI#: Gaudencio Kearns Do [[id number]]  Address: [address] [place] [address]  Phone: [phone]     ADMISSION INFORMATION:  Place of Service: Inpatient [facility]  Place of Service Code: 21  Inpatient Admission Date/Time: 1/20/25  2:09 PM  Discharge Date/Time: No discharge date for patient encounter.  Admitting Diagnosis Code/Description:  Hyponatremia [E87.1]  Primary hypertension [I10]  Leg swelling [M79.89]  CHF exacerbation (HCC) [I50.9]  Chronic diastolic congestive heart failure (HCC) [I50.32]  Type 2 diabetes mellitus (HCC) [E11.9]  Chronic obstructive pulmonary disease, unspecified COPD type (HCC) [J44.9]  Chronic heart failure with preserved ejection fraction (HFpEF) (HCC) [I50.32]     UTILIZATION REVIEW CONTACT:  Lorraine Carter, Utilization   Network Utilization Review Department  Phone: [phone]  Fax [phone]  Email: [email or web address]  Contact for approvals/pending authorizations, clinical reviews, and discharge.     PHYSICIAN ADVISORY SERVICES:  Medical Necessity Denial & Uigp-[email or web address]-Upqa Review  Phone: [phone]  Fax: [phone]  Email: [email or web address]     DISCHARGE SUPPORT TEAM:  For Patients Discharge Needs & Updates  Phone: [phone] opt. 2 Fax: [phone]  Email: [email or web address]

## 2025-01-21 NOTE — ASSESSMENT & PLAN NOTE
Lab Results   Component Value Date    EGFR 34 01/21/2025    EGFR 36 01/20/2025    EGFR 36 01/19/2025    CREATININE 1.42 (H) 01/21/2025    CREATININE 1.37 (H) 01/20/2025    CREATININE 1.36 (H) 01/19/2025     Baseline creatinine around 1.3-1.5, remains at baseline, monitor with diuresis

## 2025-01-22 LAB
ANION GAP SERPL CALCULATED.3IONS-SCNC: 7 MMOL/L (ref 4–13)
BUN SERPL-MCNC: 20 MG/DL (ref 5–25)
CALCIUM SERPL-MCNC: 8.6 MG/DL (ref 8.4–10.2)
CHLORIDE SERPL-SCNC: 92 MMOL/L (ref 96–108)
CO2 SERPL-SCNC: 39 MMOL/L (ref 21–32)
CREAT SERPL-MCNC: 1.46 MG/DL (ref 0.6–1.3)
ERYTHROCYTE [DISTWIDTH] IN BLOOD BY AUTOMATED COUNT: 14.8 % (ref 11.6–15.1)
GFR SERPL CREATININE-BSD FRML MDRD: 33 ML/MIN/1.73SQ M
GLUCOSE SERPL-MCNC: 120 MG/DL (ref 65–140)
GLUCOSE SERPL-MCNC: 126 MG/DL (ref 65–140)
GLUCOSE SERPL-MCNC: 132 MG/DL (ref 65–140)
GLUCOSE SERPL-MCNC: 136 MG/DL (ref 65–140)
GLUCOSE SERPL-MCNC: 167 MG/DL (ref 65–140)
HCT VFR BLD AUTO: 29.8 % (ref 34.8–46.1)
HGB BLD-MCNC: 9.3 G/DL (ref 11.5–15.4)
MCH RBC QN AUTO: 27.8 PG (ref 26.8–34.3)
MCHC RBC AUTO-ENTMCNC: 31.2 G/DL (ref 31.4–37.4)
MCV RBC AUTO: 89 FL (ref 82–98)
PLATELET # BLD AUTO: 336 THOUSANDS/UL (ref 149–390)
PMV BLD AUTO: 9.5 FL (ref 8.9–12.7)
POTASSIUM SERPL-SCNC: 3.7 MMOL/L (ref 3.5–5.3)
RBC # BLD AUTO: 3.35 MILLION/UL (ref 3.81–5.12)
SODIUM SERPL-SCNC: 138 MMOL/L (ref 135–147)
WBC # BLD AUTO: 6.22 THOUSAND/UL (ref 4.31–10.16)

## 2025-01-22 PROCEDURE — 85027 COMPLETE CBC AUTOMATED: CPT | Performed by: PHYSICIAN ASSISTANT

## 2025-01-22 PROCEDURE — 99232 SBSQ HOSP IP/OBS MODERATE 35: CPT | Performed by: INTERNAL MEDICINE

## 2025-01-22 PROCEDURE — 80048 BASIC METABOLIC PNL TOTAL CA: CPT | Performed by: PHYSICIAN ASSISTANT

## 2025-01-22 PROCEDURE — 82948 REAGENT STRIP/BLOOD GLUCOSE: CPT

## 2025-01-22 PROCEDURE — 97162 PT EVAL MOD COMPLEX 30 MIN: CPT

## 2025-01-22 PROCEDURE — 99232 SBSQ HOSP IP/OBS MODERATE 35: CPT | Performed by: PHYSICIAN ASSISTANT

## 2025-01-22 RX ORDER — METOCLOPRAMIDE HYDROCHLORIDE 5 MG/ML
5 INJECTION INTRAMUSCULAR; INTRAVENOUS ONCE
Status: COMPLETED | OUTPATIENT
Start: 2025-01-22 | End: 2025-01-22

## 2025-01-22 RX ADMIN — ACETAMINOPHEN 975 MG: 325 TABLET, FILM COATED ORAL at 13:38

## 2025-01-22 RX ADMIN — SENNOSIDES 17.2 MG: 8.6 TABLET, FILM COATED ORAL at 22:43

## 2025-01-22 RX ADMIN — FLUTICASONE PROPIONATE 2 SPRAY: 50 SPRAY, METERED NASAL at 08:14

## 2025-01-22 RX ADMIN — GABAPENTIN 300 MG: 300 CAPSULE ORAL at 20:07

## 2025-01-22 RX ADMIN — TORSEMIDE 30 MG: 10 TABLET ORAL at 17:45

## 2025-01-22 RX ADMIN — GUAIFENESIN 600 MG: 600 TABLET, EXTENDED RELEASE ORAL at 20:07

## 2025-01-22 RX ADMIN — Medication 3 MG: at 22:44

## 2025-01-22 RX ADMIN — HEPARIN SODIUM 5000 UNITS: 5000 INJECTION INTRAVENOUS; SUBCUTANEOUS at 05:22

## 2025-01-22 RX ADMIN — LIDOCAINE 1 PATCH: 50 PATCH TOPICAL at 08:14

## 2025-01-22 RX ADMIN — POLYETHYLENE GLYCOL 3350 17 G: 17 POWDER, FOR SOLUTION ORAL at 08:14

## 2025-01-22 RX ADMIN — FERROUS SULFATE TAB 325 MG (65 MG ELEMENTAL FE) 325 MG: 325 (65 FE) TAB at 08:15

## 2025-01-22 RX ADMIN — METOPROLOL SUCCINATE 12.5 MG: 25 TABLET, EXTENDED RELEASE ORAL at 08:15

## 2025-01-22 RX ADMIN — HEPARIN SODIUM 5000 UNITS: 5000 INJECTION INTRAVENOUS; SUBCUTANEOUS at 22:43

## 2025-01-22 RX ADMIN — ASPIRIN 81 MG CHEWABLE TABLET 81 MG: 81 TABLET CHEWABLE at 08:15

## 2025-01-22 RX ADMIN — INSULIN LISPRO 1 UNITS: 100 INJECTION, SOLUTION INTRAVENOUS; SUBCUTANEOUS at 17:46

## 2025-01-22 RX ADMIN — Medication 400 MG: at 08:15

## 2025-01-22 RX ADMIN — DOCUSATE SODIUM 100 MG: 100 CAPSULE, LIQUID FILLED ORAL at 08:15

## 2025-01-22 RX ADMIN — METOCLOPRAMIDE 5 MG: 5 INJECTION, SOLUTION INTRAMUSCULAR; INTRAVENOUS at 13:49

## 2025-01-22 RX ADMIN — HEPARIN SODIUM 5000 UNITS: 5000 INJECTION INTRAVENOUS; SUBCUTANEOUS at 13:38

## 2025-01-22 RX ADMIN — PANTOPRAZOLE SODIUM 40 MG: 40 TABLET, DELAYED RELEASE ORAL at 08:15

## 2025-01-22 RX ADMIN — NYSTATIN: 100000 POWDER TOPICAL at 08:24

## 2025-01-22 RX ADMIN — ATORVASTATIN CALCIUM 40 MG: 40 TABLET, FILM COATED ORAL at 17:45

## 2025-01-22 RX ADMIN — TORSEMIDE 30 MG: 10 TABLET ORAL at 08:15

## 2025-01-22 RX ADMIN — FAMOTIDINE 20 MG: 20 TABLET, FILM COATED ORAL at 08:15

## 2025-01-22 RX ADMIN — ONDANSETRON 4 MG: 2 INJECTION INTRAMUSCULAR; INTRAVENOUS at 22:59

## 2025-01-22 RX ADMIN — GUAIFENESIN 600 MG: 600 TABLET, EXTENDED RELEASE ORAL at 08:15

## 2025-01-22 RX ADMIN — LATANOPROST 1 DROP: 50 SOLUTION OPHTHALMIC at 22:43

## 2025-01-22 RX ADMIN — ACETAMINOPHEN 975 MG: 325 TABLET, FILM COATED ORAL at 05:22

## 2025-01-22 RX ADMIN — ONDANSETRON 4 MG: 2 INJECTION INTRAMUSCULAR; INTRAVENOUS at 11:25

## 2025-01-22 RX ADMIN — NYSTATIN: 100000 POWDER TOPICAL at 17:46

## 2025-01-22 RX ADMIN — MORPHINE SULFATE 15 MG: 15 TABLET ORAL at 17:51

## 2025-01-22 RX ADMIN — DOCUSATE SODIUM 100 MG: 100 CAPSULE, LIQUID FILLED ORAL at 17:45

## 2025-01-22 RX ADMIN — LEVOTHYROXINE SODIUM 37.5 MCG: 75 TABLET ORAL at 05:21

## 2025-01-22 RX ADMIN — ACETAMINOPHEN 975 MG: 325 TABLET, FILM COATED ORAL at 22:43

## 2025-01-22 RX ADMIN — Medication 400 MG: at 17:45

## 2025-01-22 RX ADMIN — Medication: at 08:24

## 2025-01-22 RX ADMIN — MORPHINE SULFATE 15 MG: 15 TABLET ORAL at 08:15

## 2025-01-22 NOTE — PLAN OF CARE
Problem: PAIN - ADULT  Goal: Verbalizes/displays adequate comfort level or baseline comfort level  Description: Interventions:  - Encourage patient to monitor pain and request assistance  - Assess pain using appropriate pain scale  - Administer analgesics based on type and severity of pain and evaluate response  - Implement non-pharmacological measures as appropriate and evaluate response  - Consider cultural and social influences on pain and pain management  - Notify physician/advanced practitioner if interventions unsuccessful or patient reports new pain  Outcome: Progressing     Problem: INFECTION - ADULT  Goal: Absence or prevention of progression during hospitalization  Description: INTERVENTIONS:  - Assess and monitor for signs and symptoms of infection  - Monitor lab/diagnostic results  - Monitor all insertion sites, i.e. indwelling lines, tubes, and drains  - Monitor endotracheal if appropriate and nasal secretions for changes in amount and color  - [place] appropriate cooling/warming therapies per order  - Administer medications as ordered  - Instruct and encourage patient and family to use good hand hygiene technique  - Identify and instruct in appropriate isolation precautions for identified infection/condition  Outcome: Progressing  Goal: Absence of fever/infection during neutropenic period  Description: INTERVENTIONS:  - Monitor WBC    Outcome: Progressing     Problem: SAFETY ADULT  Goal: Patient will remain free of falls  Description: INTERVENTIONS:  - Educate patient/family on patient safety including physical limitations  - Instruct patient to call for assistance with activity   - Consult OT/PT to assist with strengthening/mobility   - Keep Call bell within reach  - Keep bed low and locked with side rails adjusted as appropriate  - Keep care items and personal belongings within reach  - Initiate and maintain comfort rounds  - Make Fall Risk Sign visible to staff  - Apply yellow socks and bracelet  for high fall risk patients  - Consider moving patient to room near nurses station  Outcome: Progressing  Goal: Maintain or return to baseline ADL function  Description: INTERVENTIONS:  -  Assess patient's ability to carry out ADLs; assess patient's baseline for ADL function and identify physical deficits which impact ability to perform ADLs (bathing, care of mouth/teeth, toileting, grooming, dressing, etc.)  - Assess/evaluate cause of self-care deficits   - Assess range of motion  - Assess patient's mobility; develop plan if impaired  - Assess patient's need for assistive devices and provide as appropriate  - Encourage maximum independence but intervene and supervise when necessary  - Involve family in performance of ADLs  - Assess for home care needs following discharge   - Consider OT consult to assist with ADL evaluation and planning for discharge  - Provide patient education as appropriate  Outcome: Progressing  Goal: Maintains/Returns to pre admission functional level  Description: INTERVENTIONS:  - Perform AM-PAC 6 Click Basic Mobility/ Daily Activity assessment daily.  - Set and communicate daily mobility goal to care team and patient/family/caregiver.   - Collaborate with rehabilitation services on mobility goals if consulted  - Out of bed for toileting  - Record patient progress and toleration of activity level   Outcome: Progressing     Problem: DISCHARGE PLANNING  Goal: Discharge to home or other facility with appropriate resources  Description: INTERVENTIONS:  - Identify barriers to discharge w/patient and caregiver  - Arrange for needed discharge resources and transportation as appropriate  - Identify discharge learning needs (meds, wound care, etc.)  - Arrange for interpretive services to assist at discharge as needed  - Refer to Case Management Department for coordinating discharge planning if the patient needs post-hospital services based on physician/advanced practitioner order or complex needs  related to functional status, cognitive ability, or social support system  Outcome: Progressing     Problem: Knowledge Deficit  Goal: Patient/family/caregiver demonstrates understanding of disease process, treatment plan, medications, and discharge instructions  Description: Complete learning assessment and assess knowledge base.  Interventions:  - Provide teaching at level of understanding  - Provide teaching via preferred learning methods  Outcome: Progressing     Problem: RESPIRATORY - ADULT  Goal: Achieves optimal ventilation and oxygenation  Description: INTERVENTIONS:  - Assess for changes in respiratory status  - Assess for changes in mentation and behavior  - Position to facilitate oxygenation and minimize respiratory effort  - Oxygen administered by appropriate delivery if ordered  - Initiate smoking cessation education as indicated  - Encourage broncho-pulmonary hygiene including cough, deep breathe, Incentive Spirometry  - Assess the need for suctioning and aspirate as needed  - Assess and instruct to report SOB or any respiratory difficulty  - Respiratory Therapy support as indicated  Outcome: Progressing     Problem: SKIN/TISSUE INTEGRITY - ADULT  Goal: Skin Integrity remains intact(Skin Breakdown Prevention)  Description: Assess:  -Perform Jack assessment every   -Clean and moisturize skin every   -Inspect skin when repositioning, toileting, and assisting with ADLS  -Assess under medical devices such as  every   -Assess extremities for adequate circulation and sensation     Bed Management:  -Have minimal linens on bed & keep smooth, unwrinkled  -Change linens as needed when moist or perspiring  -Avoid sitting or lying in one position for more than  hours while in bed  -Keep HOB at degrees     Toileting:  -Offer bedside commode  -Assess for incontinence every   -Use incontinent care products after each incontinent episode such as wipes and pads and hydraguard cream    Activity:  -Mobilize patient   times a day  -Encourage activity and walks on unit  -Encourage or provide ROM exercises   -Turn and reposition patient every  Hours  -Use appropriate equipment to lift or move patient in bed  -Instruct/ Assist with weight shifting every  when out of bed in chair  -Consider limitation of chair time  hour intervals    Skin Care:  -Avoid use of baby powder, tape, friction and shearing, hot water or constrictive clothing  -Relieve pressure over bony prominences using   -Do not massage red bony areas    Next Steps:  -Teach patient strategies to minimize risks such as    -Consider consults to  interdisciplinary teams such as   Outcome: Progressing     Problem: Prexisting or High Potential for Compromised Skin Integrity  Goal: Skin integrity is maintained or improved  Description: INTERVENTIONS:  - Identify patients at risk for skin breakdown  - Assess and monitor skin integrity  - Assess and monitor nutrition and hydration status  - Monitor labs   - Assess for incontinence   - Turn and reposition patient  - Assist with mobility/ambulation  - Relieve pressure over bony prominences  - Avoid friction and shearing  - Provide appropriate hygiene as needed including keeping skin clean and dry  - Evaluate need for skin moisturizer/barrier cream  - Collaborate with interdisciplinary team   - Patient/family teaching  - Consider wound care consult   Outcome: Progressing

## 2025-01-22 NOTE — PLAN OF CARE
Problem: PHYSICAL THERAPY ADULT  Goal: Performs mobility at highest level of function for planned discharge setting.  See evaluation for individualized goals.  Description: Treatment/Interventions: Functional transfer training, LE strengthening/ROM, Therapeutic exercise, Patient/family training, Equipment eval/education, Bed mobility, Compensatory technique education, Gait training, Spoke to case management          See flowsheet documentation for full assessment, interventions and recommendations.  Note: Prognosis: Fair  Problem List: Decreased strength, Impaired balance, Decreased mobility, Pain  Assessment: Mattie Varela is a 79 y.o. female admitted to [facility] on 1/19/2025 for Acute on chronic diastolic congestive heart failure (HCC). PT was consulted and pt was seen on 1/22/2025 for mobility assessment and d/c planning. Pt presents w contact isolation, multiple lines, chronic pain, fall risk. At baseline ambulates w RW. She reports working w PT to try to be able to walk to the dining hood by herself. It appears pt mobilizes independently in her room. Pt is currently functioning at a S for transfers and ambulation w RW. Pt demonstrated ability to ambulate limited household distances. Decline further mobility dt complaints of nausea. Did observe one episode of LOB w initial ambulation which pt attributes to not feeling well. Require CGA for correction of balance/ safety at that time. No further episodes of instability noted. Will maintain on caseload to progress mobility during hospital stay and ensure appropriate dc plan. The patient's AM-PAC Basic Mobility Inpatient Short Form Raw Score is 19. A Raw score of greater than 16 suggests the patient may benefit from discharge to home.  Barriers to Discharge: None     Rehab Resource Intensity Level, PT: III (Minimum Resource Intensity) (cont HH PT at facility)    See flowsheet documentation for full assessment.

## 2025-01-22 NOTE — ASSESSMENT & PLAN NOTE
Lab Results   Component Value Date    EGFR 33 01/22/2025    EGFR 34 01/21/2025    EGFR 36 01/20/2025    CREATININE 1.46 (H) 01/22/2025    CREATININE 1.42 (H) 01/21/2025    CREATININE 1.37 (H) 01/20/2025     Baseline creatinine around 1.3-1.5, remains at baseline, monitor with diuresis

## 2025-01-22 NOTE — ASSESSMENT & PLAN NOTE
Wt Readings from Last 3 Encounters:   01/22/25 77.7 kg (171 lb 3.2 oz)   12/20/24 81.2 kg (179 lb)   12/02/24 81.3 kg (179 lb 3.7 oz)     Presented with increasing weight, bilateral lower extremity edema and decreased urine output via EMS  PTA on torsemide 30 mg daily, notes she follows a sodium fluid restricted diet at [facility] her [facility]WIN  TTE 11/2024 reviewed LVEF 51%, unable to assess diastolic function  Chest x-ray clear although BNP elevated at 222 and patient with hypervolemic hyponatremia  With increasing weight, dyspnea, lower extremity edema. CXR reviewed no acute cardiopulmonary disease  Continue monitoring intake and output, daily weights  Start low-sodium diet, continue fluid restriction  S/p IV diuresis, now transition to p.o. by cardiology

## 2025-01-22 NOTE — ASSESSMENT & PLAN NOTE
With outpatient diagnosis of COPD, unclear severity.  No evidence exacerbation. Continue albuterol prn

## 2025-01-22 NOTE — ASSESSMENT & PLAN NOTE
Lab Results   Component Value Date    HGBA1C 7.4 (H) 10/12/2024     Recent Labs     01/21/25  1629 01/21/25  2105 01/22/25  0709 01/22/25  1124   POCGLU 163* 170* 126 136     Holding home metformin, A1c near goal  Continue sliding scale insulin, start level 3 diabetic diet

## 2025-01-22 NOTE — PROGRESS NOTES
Cardiology Progress Note   MD Natan Ruiz MD, FACC  Emerson Valdes DO, [facility]  MD Sindy Goldstein DO, [facility]  Torey Farnsworth DO, [facility]  ----------------------------------------------------------------  [address] [address]    Mattie Varela 79 y.o. female MRN: [id number]  Unit/Bed#: E5 -01 Encounter: [id number]      ASSESSMENT:   Acute on chronic HFpEF  LVEF 51%, septal akinesis, indeterminate diastolic function, PSWM c/w LBBB, moderate MAC, November 2024  CKD stage III  Hypertension  Dyslipidemia  Type 2 diabetes mellitus  Hypothyroid  History of CVA  COPD  GERD  DJD    PLAN:  Based on records, the patient is approximately 5 pounds from her dry weight  Can transition to torsemide 30 mg p.o. twice daily and evaluate weights over 24 hours  If weights are stable, reasonable dose for discharge, but if weights trend upward, may need 40 mg twice daily dosing  Strict I's/O's and daily weights  Keep potassium greater than 4 magnesium greater than 2  Continue aspirin, high intensity statin and beta-blocker  If weights stable, will see further as needed; please reconsult questions    Signed: Emerson Valdes DO, [facility], FASE, FASNC, FACP      History of Present Illness:  Patient seen and examined.  Nuys chest pain, pressure, tightness or squeezing.  Denies lightheadedness, dizziness or palpitations.  Denies lower extremity swelling, orthopnea or paroxysmal nocturnal dyspnea.      Review of Systems:  Review of Systems   Constitutional: Negative for decreased appetite, fever, weight gain and weight loss.   HENT:  Negative for congestion and sore throat.    Eyes:  Negative for visual disturbance.   Cardiovascular:  Negative for chest pain, dyspnea on exertion, leg swelling, near-syncope and palpitations.   Respiratory:  Negative for cough and shortness of breath.    Hematologic/Lymphatic: Negative for bleeding problem.   Skin:  Negative for rash.    Musculoskeletal:  Negative for myalgias and neck pain.   Gastrointestinal:  Negative for abdominal pain and nausea.   Neurological:  Negative for light-headedness and weakness.   Psychiatric/Behavioral:  Negative for depression.        Allergies   Allergen Reactions    Penicillins Anaphylaxis, Hives and Other (See Comments)     Other reaction(s): Unknown Reaction    Sulfa Antibiotics Anaphylaxis and Other (See Comments)     Other reaction(s): Unknown Reaction    Aspartame - Food Allergy Other (See Comments) and Hypertension     Slurred speech, weakness, stroke sx    Iodinated Contrast Media Hives     Pt has taken prep prior for contrast and has not had any break through reaction    Keflex [Cephalexin] Hives       No current facility-administered medications on file prior to encounter.     Current Outpatient Medications on File Prior to Encounter   Medication Sig    albuterol (PROVENTIL HFA,VENTOLIN HFA) 90 mcg/act inhaler Inhale 2 puffs every 6 (six) hours as needed for wheezing or shortness of breath    aspirin 81 mg chewable tablet Chew 81 mg daily    ergocalciferol (VITAMIN D2) 50,000 units Take 50,000 Units by mouth Take I cap orally once monthly    famotidine (PEPCID) 20 mg tablet Take 20 mg by mouth 2 (two) times a day 1 tab orally twice daily as needed for heartburn/acid reflux    fentaNYL (DURAGESIC) 50 mcg/hr Place 1 patch on the skin every third day Apply 1 patch every 3 days    ferrous sulfate 325 (65 Fe) mg tablet Take 1 tablet (325 mg total) by mouth daily with breakfast    gabapentin (NEURONTIN) 100 mg capsule Take 1 capsule (100 mg total) by mouth daily at bedtime (Patient taking differently: Take 300 mg by mouth daily at bedtime 1 cap at bedtime)    latanoprost (XALATAN) 0.005 % ophthalmic solution Administer 1 drop to both eyes daily at bedtime    levothyroxine 75 mcg tablet Take 0.5 tablets (37.5 mcg total) by mouth daily in the early morning    Lidocaine 4 % PTCH Apply 4 % topically Apply 1  every morning to back and remove at bedtime    MAGNESIUM OXIDE 400 PO Take 400 mg by mouth 2 (two) times a day    meclizine (ANTIVERT) 25 mg tablet Take 1 tablet (25 mg total) by mouth 4 (four) times a day as needed for dizziness (Patient taking differently: Take 25 mg by mouth daily as needed for dizziness 1 tab daily as needed for dizziness)    melatonin 3 mg Take 3 mg by mouth daily at bedtime 1 tab orally at bedtime    metFORMIN (GLUCOPHAGE) 500 mg tablet Take 500 mg by mouth 2 (two) times a day with meals 1 tablet PO twice daily with meals    methocarbamol (ROBAXIN) 750 mg tablet Take 750 mg by mouth every 8 (eight) hours 1 tab orally every 8 hours as needed for muscle spasms    metoprolol succinate (TOPROL-XL) 25 mg 24 hr tablet Take 0.5 tablets (12.5 mg total) by mouth daily    morphine (MSIR) 15 mg tablet Take 15 mg by mouth every 6 (six) hours as needed for severe pain 1 tablet ever 6 hours for pain    senna (SENOKOT) 8.6 mg Take 2 tablets (17.2 mg total) by mouth daily at bedtime (Patient taking differently: Take 17.2 mg by mouth daily as needed for constipation)    simethicone (MYLICON) 80 mg chewable tablet Chew 80 mg every 6 (six) hours as needed for flatulence Chew and swallow 1 tab orally 3 times daily as needed for gas    torsemide (DEMADEX) 20 mg tablet Take 20 mg by mouth daily 1 tablet orally daily as needed for leg swelling. Do not take additional doses more then 5 days in a row unless expressly directed by provider    acetaminophen (TYLENOL) 325 mg tablet Take 3 tablets (975 mg total) by mouth every 8 (eight) hours    atorvastatin (LIPITOR) 40 mg tablet TAKE ONE TABLET BY MOUTH ONCE DAILY (Patient taking differently: 40 mg One tablet by mouth every evening with dinner)    baclofen 10 mg tablet Take 1 tablet (10 mg total) by mouth 2 (two) times a day as needed for muscle spasms    Blood Glucose Monitoring Suppl (OneTouch Verio Reflect) w/Device KIT Check blood sugars twice daily. Please  substitute with appropriate alternative as covered by patient's insurance. Dx: E11.65    docusate sodium (COLACE) 100 mg capsule Take 1 capsule (100 mg total) by mouth 2 (two) times a day    glucose blood (OneTouch Verio) test strip Check blood sugars twice daily. Please substitute with appropriate alternative as covered by patient's insurance. Dx: E11.65    HYDROmorphone (DILAUDID) 2 mg tablet Take 1 tablet (2 mg total) by mouth every 6 (six) hours as needed for moderate pain or severe pain Max Daily Amount: 8 mg (Patient not taking: Reported on 1/19/2025)    lidocaine (LIDODERM) 5 % Apply 1 patch topically over 12 hours daily Remove & Discard patch within 12 hours or as directed by MD    Milnacipran HCl (Savella) 100 MG TABS Take 1 tablet (100 mg total) by mouth daily    OneTouch Delica Lancets 33G MISC Check blood sugars twice daily. Please substitute with appropriate alternative as covered by patient's insurance. Dx: E11.65    pantoprazole (PROTONIX) 40 mg tablet Take 1 tablet (40 mg total) by mouth daily    polyethylene glycol (MIRALAX) 17 g packet Take 17 g by mouth daily    sucralfate (CARAFATE) 1 g tablet Take 1 tablet (1 g total) by mouth 2 (two) times a day before meals for 7 days    torsemide (DEMADEX) 10 mg tablet Take 3 tablets (30 mg total) by mouth 2 (two) times a day (Patient taking differently: Take 20 mg by mouth 2 (two) times a day Take 1.5 tablet for a total of 30 mg every morning for edema)        Current Facility-Administered Medications   Medication Dose Route Frequency Provider Last Rate    acetaminophen  975 mg Oral Q8H [facility] Jose Gaines PA-C      albuterol  2 puff Inhalation Q6H PRN Jose Gaines PA-C      aluminum-magnesium hydroxide-simethicone  30 mL Oral Q6H PRN Brayden Thompson PA-C      aspirin  81 mg Oral Daily Brayden Thompson PA-C      atorvastatin  40 mg Oral Daily With Dinner Brayden Thompson PA-C      docusate sodium  100 mg Oral BID Jose Gaines PA-C      famotidine  20 mg Oral Daily  Jose Gaines PA-C      fentaNYL  1 patch Transdermal Q72H Jose Gaines PA-C      ferrous sulfate  325 mg Oral Daily With Breakfast Jose Gaines PA-C      fluticasone  2 spray Each Nare Daily Jose Gaines PA-C      gabapentin  300 mg Oral HS Jose Gaines PA-C      guaiFENesin  600 mg Oral Q12H RAY Jose Gaines PA-C      heparin (porcine)  5,000 Units Subcutaneous Q8H ARY Brayden Thompson PA-C      insulin lispro  1-6 Units Subcutaneous 4x Daily (AC & HS) Brayden Thompson PA-C      latanoprost  1 drop Both Eyes HS Jose Gaines PA-C      levothyroxine  37.5 mcg Oral Early Morning Brayden Thompson PA-C      lidocaine  1 patch Topical Daily Jose Gaines PA-C      magnesium Oxide  400 mg Oral BID Jose Gaines PA-C      melatonin  3 mg Oral HS PRN Jose Gaines PA-C      methocarbamol  750 mg Oral Q8H PRN Jose Gaines PA-C      metoprolol succinate  12.5 mg Oral Daily Brayden Thompson PA-C      morphine  15 mg Oral Q6H PRN Brayden Thompson PA-C      nystatin   Topical BID Jose Gaines PA-C      ondansetron  4 mg Intravenous Q6H PRN Brayden Thompson PA-C      pantoprazole  40 mg Oral Daily Jose Gaines PA-C      polyethylene glycol  17 g Oral Daily PRN Brayden Thompson PA-C      polyethylene glycol  17 g Oral Daily Jose Gaines PA-C      senna  17.2 mg Oral HS Jose Gaines PA-C      torsemide  30 mg Oral BID (diuretic) Lamonte Sheehan PA-C      white petrolatum-mineral oil   Topical Daily Jose Gaines PA-C              Vitals:    01/21/25 2319 01/21/25 2320 01/22/25 0600 01/22/25 0708   BP: 122/60 122/60  140/73   BP Location:  Right arm  Right arm   Pulse: 96 95  102   Resp:  18  16   Temp:  98.6 °F (37 °C)     TempSrc:  Oral     SpO2: 94% 91%  99%   Weight:   77.7 kg (171 lb 3.2 oz)    Height:         Body mass index is 33.44 kg/m².      Intake/Output Summary (Last 24 hours) at 1/22/2025 1204  Last data filed at 1/22/2025 0595  Gross per 24 hour   Intake 250 ml   Output 750 ml   Net -500 ml       Weight change: -0.544 kg (-1 lb 3.2  "oz)    PHYSICAL EXAMINATION:  Gen: Awake, Alert, NAD  Head/eyes: AT/NC, pupils equal and round, Anicteric  ENT: mmm  Neck: Supple, No elevated JVP, trachea midline  Resp: CTA bilaterally no w/r/r  CV: RRR +S1, S2, No m/r/g  Abd: Soft, NT/ND + BS  Ext: no LE edema bilaterally  Neuro: Follows commands, moves all extermities  Psych: Appropriate affect, pleasant mood, pleasant attitude, non-combative  Skin: warm; no rash, erythema or venous stasis changes on exposed skin    Lab Results:  Results from last 7 days   Lab Units 01/22/25  0601   WBC Thousand/uL 6.22   HEMOGLOBIN g/dL 9.3*   HEMATOCRIT % 29.8*   PLATELETS Thousands/uL 336     Results from last 7 days   Lab Units 01/22/25  0601 01/21/25  0559 01/20/25  0435   POTASSIUM mmol/L 3.7   < > 4.2   CHLORIDE mmol/L 92*   < > 90*   CO2 mmol/L 39*   < > 35*   BUN mg/dL 20   < > 22   CREATININE mg/dL 1.46*   < > 1.37*   CALCIUM mg/dL 8.6   < > 8.8   ALK PHOS U/L  --   --  101   ALT U/L  --   --  8   AST U/L  --   --  15    < > = values in this interval not displayed.     No results found for: \"TROPONINT\"      Results from last 7 days   Lab Units 01/19/25  0757 01/19/25  0553 01/19/25  0347   HS TNI 0HR ng/L  --   --  8   HS TNI 2HR ng/L  --  7  --    HS TNI 4HR ng/L 7  --   --            This note was completed in part utilizing M-Modal Fluency Direct Software.  Grammatical errors, random word insertions, spelling mistakes, and incomplete sentences may be an occasional consequence of this system secondary to software limitations, ambient noise, and hardware issues.  If you have any questions or concerns about the content, text, or information contained within the body of this dictation, please contact the provider for clarification.      "

## 2025-01-22 NOTE — PLAN OF CARE
Problem: PAIN - ADULT  Goal: Verbalizes/displays adequate comfort level or baseline comfort level  Description: Interventions:  - Encourage patient to monitor pain and request assistance  - Assess pain using appropriate pain scale  - Administer analgesics based on type and severity of pain and evaluate response  - Implement non-pharmacological measures as appropriate and evaluate response  - Consider cultural and social influences on pain and pain management  - Notify physician/advanced practitioner if interventions unsuccessful or patient reports new pain  Outcome: Progressing     Problem: INFECTION - ADULT  Goal: Absence or prevention of progression during hospitalization  Description: INTERVENTIONS:  - Assess and monitor for signs and symptoms of infection  - Monitor lab/diagnostic results  - Monitor all insertion sites, i.e. indwelling lines, tubes, and drains  - Monitor endotracheal if appropriate and nasal secretions for changes in amount and color  - [place] appropriate cooling/warming therapies per order  - Administer medications as ordered  - Instruct and encourage patient and family to use good hand hygiene technique  - Identify and instruct in appropriate isolation precautions for identified infection/condition  Outcome: Progressing  Goal: Absence of fever/infection during neutropenic period  Description: INTERVENTIONS:  - Monitor WBC    Outcome: Progressing     Problem: SAFETY ADULT  Goal: Patient will remain free of falls  Description: INTERVENTIONS:  - Educate patient/family on patient safety including physical limitations  - Instruct patient to call for assistance with activity   - Consult OT/PT to assist with strengthening/mobility   - Keep Call bell within reach  - Keep bed low and locked with side rails adjusted as appropriate  - Keep care items and personal belongings within reach  - Initiate and maintain comfort rounds  - Make Fall Risk Sign visible to staff  - Apply yellow socks and bracelet  for high fall risk patients  - Consider moving patient to room near nurses station  Outcome: Progressing  Goal: Maintain or return to baseline ADL function  Description: INTERVENTIONS:  -  Assess patient's ability to carry out ADLs; assess patient's baseline for ADL function and identify physical deficits which impact ability to perform ADLs (bathing, care of mouth/teeth, toileting, grooming, dressing, etc.)  - Assess/evaluate cause of self-care deficits   - Assess range of motion  - Assess patient's mobility; develop plan if impaired  - Assess patient's need for assistive devices and provide as appropriate  - Encourage maximum independence but intervene and supervise when necessary  - Involve family in performance of ADLs  - Assess for home care needs following discharge   - Consider OT consult to assist with ADL evaluation and planning for discharge  - Provide patient education as appropriate  Outcome: Progressing  Goal: Maintains/Returns to pre admission functional level  Description: INTERVENTIONS:  - Perform AM-PAC 6 Click Basic Mobility/ Daily Activity assessment daily.  - Set and communicate daily mobility goal to care team and patient/family/caregiver.   - Collaborate with rehabilitation services on mobility goals if consulted  - Out of bed for toileting  - Record patient progress and toleration of activity level   Outcome: Progressing     Problem: DISCHARGE PLANNING  Goal: Discharge to home or other facility with appropriate resources  Description: INTERVENTIONS:  - Identify barriers to discharge w/patient and caregiver  - Arrange for needed discharge resources and transportation as appropriate  - Identify discharge learning needs (meds, wound care, etc.)  - Arrange for interpretive services to assist at discharge as needed  - Refer to Case Management Department for coordinating discharge planning if the patient needs post-hospital services based on physician/advanced practitioner order or complex needs  related to functional status, cognitive ability, or social support system  Outcome: Progressing     Problem: Knowledge Deficit  Goal: Patient/family/caregiver demonstrates understanding of disease process, treatment plan, medications, and discharge instructions  Description: Complete learning assessment and assess knowledge base.  Interventions:  - Provide teaching at level of understanding  - Provide teaching via preferred learning methods  Outcome: Progressing     Problem: RESPIRATORY - ADULT  Goal: Achieves optimal ventilation and oxygenation  Description: INTERVENTIONS:  - Assess for changes in respiratory status  - Assess for changes in mentation and behavior  - Position to facilitate oxygenation and minimize respiratory effort  - Oxygen administered by appropriate delivery if ordered  - Initiate smoking cessation education as indicated  - Encourage broncho-pulmonary hygiene including cough, deep breathe, Incentive Spirometry  - Assess the need for suctioning and aspirate as needed  - Assess and instruct to report SOB or any respiratory difficulty  - Respiratory Therapy support as indicated  Outcome: Progressing     Problem: SKIN/TISSUE INTEGRITY - ADULT  Goal: Skin Integrity remains intact(Skin Breakdown Prevention)  Description: Assess:  -Perform Jack assessment every   -Clean and moisturize skin every   -Inspect skin when repositioning, toileting, and assisting with ADLS  -Assess under medical devices such as  every   -Assess extremities for adequate circulation and sensation     Bed Management:  -Have minimal linens on bed & keep smooth, unwrinkled  -Change linens as needed when moist or perspiring  -Avoid sitting or lying in one position for more than  hours while in bed  -Keep HOB at degrees     Toileting:  -Offer bedside commode  -Assess for incontinence every   -Use incontinent care products after each incontinent episode such as wipes and pads and hydraguard cream    Activity:  -Mobilize patient   times a day  -Encourage activity and walks on unit  -Encourage or provide ROM exercises   -Turn and reposition patient every  Hours  -Use appropriate equipment to lift or move patient in bed  -Instruct/ Assist with weight shifting every  when out of bed in chair  -Consider limitation of chair time  hour intervals    Skin Care:  -Avoid use of baby powder, tape, friction and shearing, hot water or constrictive clothing  -Relieve pressure over bony prominences using   -Do not massage red bony areas    Next Steps:  -Teach patient strategies to minimize risks such as    -Consider consults to  interdisciplinary teams such as   Outcome: Progressing     Problem: Prexisting or High Potential for Compromised Skin Integrity  Goal: Skin integrity is maintained or improved  Description: INTERVENTIONS:  - Identify patients at risk for skin breakdown  - Assess and monitor skin integrity  - Assess and monitor nutrition and hydration status  - Monitor labs   - Assess for incontinence   - Turn and reposition patient  - Assist with mobility/ambulation  - Relieve pressure over bony prominences  - Avoid friction and shearing  - Provide appropriate hygiene as needed including keeping skin clean and dry  - Evaluate need for skin moisturizer/barrier cream  - Collaborate with interdisciplinary team   - Patient/family teaching  - Consider wound care consult   Outcome: Progressing

## 2025-01-22 NOTE — CASE MANAGEMENT
Case Management Assessment & Discharge Planning Note    Patient name Mattie Varela  Location East 5 /E5 -* MRN [id number]  : 1945 Date 2025       Current Admission Date: 2025  Current Admission Diagnosis:Acute on chronic diastolic congestive heart failure (HCC)   Patient Active Problem List    Diagnosis Date Noted Date Diagnosed    Hyponatremia 2025     Stage 3b chronic kidney disease (HCC) 2024     Insomnia 2024     Patella fracture 2024     Gastro-esophageal reflux disease without esophagitis 10/19/2024     Constipation 2024     Acute on chronic diastolic congestive heart failure (HCC) 2024     s/p Medtronic loop recorder 3/2/2024 2024     Moderate protein-calorie malnutrition ([facility]) 2024     Left ventricular outflow obstruction 2024     Obesity, Class I, BMI 30-34.9 2024     Urinary retention 2023     Non obstructive CAD 11/15/2023     Acute respiratory failure with hypoxia ([facility]) 11/10/2023     Chronic obstructive pulmonary disease, unspecified COPD type ([facility]) 2023     Confusion with body shakes and blank stare 2023     Anemia in stage 3b chronic kidney disease  ([facility]) 2023     Cerebrovascular accident (CVA), unspecified mechanism ([facility]) 2022     Prepyloric ulcer 2021     Mild intermittent asthma without complication 2020     S/P insertion of spinal cord stimulator 2018     Iron deficiency anemia secondary to inadequate dietary iron intake 2018     Type 2 diabetes mellitus with other skin complications ([facility])      Hypothyroidism 2017     Glaucoma 2017     Hypercholesterolemia 2016     Anxiety 2015     Chronic pain disorder 2013     Hypertension 2013       LOS (days): 2  Geometric Mean LOS (GMLOS) (days):   Days to GMLOS:     OBJECTIVE:    Risk of Unplanned Readmission Score: 44.4         Current admission status: Inpatient        Preferred Pharmacy:   [facility] - [place], [place] - [address]  [address]  [address]  [facility] [address]  Phone: [phone] Fax: [phone]    [facility] [place] [address]  [facility],  [address]  [facility],  First Floor [facility] [address]  Phone: [phone] Fax: [phone]    [facility] - [place], [place] - [address]  [address]  [place] [address]  Phone: [phone] Fax: [phone]    Primary Care Provider: NANY Pollock    Primary Insurance: [facility]  Secondary Insurance:     ASSESSMENT:  Active Health Care Proxies       Venice Baires [facility] Care Representative - Daughter   Primary Phone: [phone] (Mobile)  Home Phone: [phone]                 Advance Directives  Primary Contact: daughter Venice [phone]         Readmission Root Cause  30 Day Readmission: No    Patient Information  Admitted from:: Facility ([facility])  Mental Status: Alert  During Assessment patient was accompanied by: Not accompanied during assessment  Assessment information provided by:: Other - please comment [facility]  Primary Caregiver: Self  Support Systems: Children  County of Residence: [place]  What [facility] do you live in?: [place]  Home entry access options. Select all that apply.: No steps to enter home  Type of Current Residence: Facility  Upon entering residence, is there a bedroom on the main floor (no further steps)?: Yes  Upon entering residence, is there a bathroom on the main floor (no further steps)?: Yes  Living Arrangements: Lives in Facility  Is patient a ?: No    Activities of Daily Living Prior to Admission  Functional Status: Independent  Completes ADLs independently?: Yes  Ambulates independently?: Yes  Does patient use assisted devices?: Yes  Assisted Devices (DME) used: Walker  Does patient currently own DME?: Yes  What DME does the patient currently own?:  Walker  Does patient have a history of Outpatient Therapy (PT/OT)?: No  Does the patient have a history of Short-Term Rehab?: Yes ([facility])  Does patient have a history of HHC?: Yes ([facility])  Does patient currently have HHC?: No    Patient Information Continued  Income Source: SSI/SSD  Does patient have prescription coverage?: Yes  Does patient receive dialysis treatments?: No  Does patient have a history of substance abuse?: No  Does patient have a history of Mental Health Diagnosis?: Yes (anxiety)  Has patient received inpatient treatment related to mental health in the last 2 years?: No    Means of Transportation  Means of Transport to Appts:: Family transport          DISCHARGE DETAILS:    Discharge planning discussed with:: nurse Berman at [facility]  [place] of Choice: Yes  Comments - [place] of Choice: wants return to [facility]  CM contacted family/caregiver?: Yes (voicemail left for daughter)     Contacts  Patient Contacts: daughter, Dr. Venice Baires -- left message  Relationship to Patient:: Family  Contact Method: Phone  Phone Number: [phone]  Reason/Outcome: Discharge Planning    Treatment Team Recommendation: Facility Return, Home with Home Health Care          Additional Comments: Patient is from [facility] [phone] option 3, per Antonella nurse at [facility], PT OT notes should be faxed to [phone] and patient uses [facility]. Any home health would need to be arranged by [facility]. CM left voicemail for daughter to discuss transportation.

## 2025-01-22 NOTE — PROGRESS NOTES
Progress Note - Hospitalist   Name: Mattie Varela 79 y.o. female I MRN: [id number]  Unit/Bed#: E5 -01 I Date of Admission: 1/19/2025   Date of Service: 1/22/2025 I Hospital Day: 2    Assessment & Plan  Acute on chronic diastolic congestive heart failure (HCC)  Wt Readings from Last 3 Encounters:   01/22/25 77.7 kg (171 lb 3.2 oz)   12/20/24 81.2 kg (179 lb)   12/02/24 81.3 kg (179 lb 3.7 oz)     Presented with increasing weight, bilateral lower extremity edema and decreased urine output via EMS  PTA on torsemide 30 mg daily, notes she follows a sodium fluid restricted diet at [facility] her [facility]  TTE 11/2024 reviewed LVEF 51%, unable to assess diastolic function  Chest x-ray clear although BNP elevated at 222 and patient with hypervolemic hyponatremia  With increasing weight, dyspnea, lower extremity edema. CXR reviewed no acute cardiopulmonary disease  Continue monitoring intake and output, daily weights  Start low-sodium diet, continue fluid restriction  S/p IV diuresis, now transition to p.o. by cardiology  Acute respiratory failure with hypoxia (HCC)  Initially requiring 2 L nasal cannula to maintain above 88%  Pt reports she is not on oxygen at home  Chest x-ray clear without evidence of pneumonia  Now on room air and PO diuretics  Stage 3b chronic kidney disease (HCC)  Lab Results   Component Value Date    EGFR 33 01/22/2025    EGFR 34 01/21/2025    EGFR 36 01/20/2025    CREATININE 1.46 (H) 01/22/2025    CREATININE 1.42 (H) 01/21/2025    CREATININE 1.37 (H) 01/20/2025     Baseline creatinine around 1.3-1.5, remains at baseline, monitor with diuresis  Type 2 diabetes mellitus with other skin complications ([facility])  Lab Results   Component Value Date    HGBA1C 7.4 (H) 10/12/2024     Recent Labs     01/21/25  1629 01/21/25  2105 01/22/25  0709 01/22/25  1124   POCGLU 163* 170* 126 136     Holding home metformin, A1c near goal  Continue sliding scale insulin, start level 3 diabetic diet  Urinary retention  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  Draining yellow urine.   It is unclear if patient was started on antibiotic at [facility], will discuss with CM to obtain med list  Continue outpatient follow-up with urology  Maintain urogenital care  Anemia in stage 3b chronic kidney disease  (HCC)  Baseline hemoglobin around 9-10, stable at 8.9  No she had a bowel movement 2 days ago that was not dark or bloody  No gross signs of bleeding on exam  Update iron panel  Hyponatremia  Hypervolemic hyponatremia suspect in setting of volume overload, with appropriate rise over 24 hours and now resolved  Monitor closely with diuretics  Chronic pain disorder  With history of chronic pain disorder status post spinal stimulator with degenerative lumbar spinal stenosis, was transitioned to oral hydromorphone and now is on morphine sulfate  Per PDMP patient is on morphine sulfate 15 mg every 6 hours for severe pain, last filled 1/13/2025  Patient is also on fentanyl 50 mcg an hour patch every third day.  Last applied 1/16/2025, will change today  Continue muscle relaxer with Robaxin as needed, gabapentin qhs prn, tylenol scheduled  Glaucoma  Continue home eyedrops  Hypothyroidism  Continue levothyroxine, check TSH  Patella fracture  With history of patella fracture after she fell at rehab in November  Recently saw orthopedics in December 2024  Cleared to ambulate with walker at baseline  PT OT bing's  Insomnia  Previously taken off Ambien, continue melatonin prn qhs  Gastro-esophageal reflux disease without esophagitis  Patient had an ulcer in 2022, denies any dark or bloody stools.  Does deal with some reflux and epigastric discomfort from time to time.  Continue Pepcid and Protonix  Constipation  Narcotic induced, continue home bowel regimen  Last noted bowel  movement 2 days ago, not dark or bloody  Monitor output  Chronic obstructive pulmonary disease, unspecified COPD type (HCC)  With outpatient diagnosis of COPD, unclear severity.  No evidence exacerbation. Continue albuterol prn      VTE Pharmacologic Prophylaxis: VTE Score: 4 Moderate Risk (Score 3-4) - Pharmacological DVT Prophylaxis Ordered: heparin.    Mobility:   Basic Mobility Inpatient Raw Score: 19  JH-HLM Goal: 6: Walk 10 steps or more  JH-HLM Achieved: 7: Walk 25 feet or more  JH-HLM Goal achieved. Continue to encourage appropriate mobility.    Patient Centered Rounds: I performed bedside rounds with nursing staff today.   Discussions with Specialists or Other Care Team Provider: Card    Education and Discussions with Family / Patient: Updated  (daughter) via phone.    Current Length of Stay: 2 day(s)  Current Patient Status: Inpatient   Certification Statement: The patient will continue to require additional inpatient hospital stay due to Nausea, monitoring  Discharge Plan: Anticipate discharge tomorrow to prior assisted or independent living facility.    Code Status: Level 1 - Full Code    Subjective   No acute events. Some nausea today without vomiting. Will continue to monitor.    Objective :  Temp:  [98.6 °F (37 °C)] 98.6 °F (37 °C)  HR:  [] 102  BP: ()/(57-73) 140/73  Resp:  [15-19] 16  SpO2:  [91 %-99 %] 99 %  O2 Device: None (Room air)  Nasal Cannula O2 Flow Rate (L/min):  [2 L/min] 2 L/min    Body mass index is 33.44 kg/m².     Input and Output Summary (last 24 hours):     Intake/Output Summary (Last 24 hours) at 1/22/2025 1341  Last data filed at 1/22/2025 0526  Gross per 24 hour   Intake 100 ml   Output 750 ml   Net -650 ml       Physical Exam  Vitals and nursing note reviewed.   Constitutional:       Appearance: Normal appearance.   HENT:      Head: Normocephalic and atraumatic.      Mouth/Throat:      Mouth: Mucous membranes are moist.      Pharynx: Oropharynx is  clear. No oropharyngeal exudate.   Eyes:      Extraocular Movements: Extraocular movements intact.   Cardiovascular:      Rate and Rhythm: Normal rate and regular rhythm.      Pulses: Normal pulses.      Heart sounds: Normal heart sounds. No murmur heard.     No friction rub. No gallop.   Pulmonary:      Effort: Pulmonary effort is normal. No respiratory distress.      Breath sounds: Normal breath sounds. No stridor. No wheezing or rales.   Abdominal:      General: Abdomen is flat. Bowel sounds are normal. There is no distension.      Palpations: Abdomen is soft.      Tenderness: There is no abdominal tenderness.   Musculoskeletal:      Right lower leg: No edema.      Left lower leg: No edema.   Skin:     General: Skin is warm and dry.   Neurological:      General: No focal deficit present.      Mental Status: She is alert and oriented to person, place, and time.           Lines/Drains:  Lines/Drains/Airways       Active Status       Name Placement date Placement time Site Days    Urethral Catheter 16 Fr. 10/26/24  1311  --  88                  Urinary Catheter:  Goal for removal: N/A - Chronic Bateman                 Lab Results: I have reviewed the following results:   Results from last 7 days   Lab Units 01/22/25  0601 01/21/25  0559 01/20/25  0435   WBC Thousand/uL 6.22   < > 6.92   HEMOGLOBIN g/dL 9.3*   < > 8.9*   HEMATOCRIT % 29.8*   < > 29.4*   PLATELETS Thousands/uL 336   < > 343   SEGS PCT %  --   --  64   LYMPHO PCT %  --   --  19   MONO PCT %  --   --  11   EOS PCT %  --   --  6    < > = values in this interval not displayed.     Results from last 7 days   Lab Units 01/22/25  0601 01/21/25  0559 01/20/25  0435   SODIUM mmol/L 138   < > 135   POTASSIUM mmol/L 3.7   < > 4.2   CHLORIDE mmol/L 92*   < > 90*   CO2 mmol/L 39*   < > 35*   BUN mg/dL 20   < > 22   CREATININE mg/dL 1.46*   < > 1.37*   ANION GAP mmol/L 7   < > 10   CALCIUM mg/dL 8.6   < > 8.8   ALBUMIN g/dL  --   --  3.5   TOTAL BILIRUBIN mg/dL  --    --  0.32   ALK PHOS U/L  --   --  101   ALT U/L  --   --  8   AST U/L  --   --  15   GLUCOSE RANDOM mg/dL 120   < > 107    < > = values in this interval not displayed.         Results from last 7 days   Lab Units 01/22/25  1124 01/22/25  0709 01/21/25  2105 01/21/25  1629 01/21/25  1138 01/21/25  0816 01/20/25  2036 01/20/25  1537 01/20/25  1227 01/20/25  0659 01/19/25  2117 01/19/25  1614   POC GLUCOSE mg/dl 136 126 170* 163* 179* 137 145* 126 196* 129 126 146*               Recent Cultures (last 7 days):         Imaging Results Review: No pertinent imaging studies reviewed.  Other Study Results Review: No additional pertinent studies reviewed.    Last 24 Hours Medication List:     Current Facility-Administered Medications:     acetaminophen (TYLENOL) tablet 975 mg, Q8H ARY    albuterol (PROVENTIL HFA,VENTOLIN HFA) inhaler 2 puff, Q6H PRN    aluminum-magnesium hydroxide-simethicone (MAALOX) oral suspension 30 mL, Q6H PRN    aspirin chewable tablet 81 mg, Daily    atorvastatin (LIPITOR) tablet 40 mg, Daily With Dinner    docusate sodium (COLACE) capsule 100 mg, BID    famotidine (PEPCID) tablet 20 mg, Daily    fentaNYL (DURAGESIC) 50 mcg/hr TD 72 hr patch 1 patch, Q72H    ferrous sulfate tablet 325 mg, Daily With Breakfast    fluticasone (FLONASE) 50 mcg/act nasal spray 2 spray, Daily    gabapentin (NEURONTIN) capsule 300 mg, HS    guaiFENesin (MUCINEX) 12 hr tablet 600 mg, Q12H ARY    heparin (porcine) subcutaneous injection 5,000 Units, Q8H ARY    insulin lispro (HumALOG/ADMELOG) 100 units/mL subcutaneous injection 1-6 Units, 4x Daily (AC & HS) **AND** Fingerstick Glucose (POCT), 4x Daily AC and at bedtime    latanoprost (XALATAN) 0.005 % ophthalmic solution 1 drop, HS    levothyroxine tablet 37.5 mcg, Early Morning    lidocaine (LIDODERM) 5 % patch 1 patch, Daily    magnesium Oxide (MAG-OX) tablet 400 mg, BID    melatonin tablet 3 mg, HS PRN    methocarbamol (ROBAXIN) tablet 750 mg, Q8H PRN    metoclopramide  (REGLAN) injection 5 mg, Once    metoprolol succinate (TOPROL-XL) 24 hr tablet 12.5 mg, Daily    morphine (MSIR) IR tablet 15 mg, Q6H PRN    nystatin (MYCOSTATIN) powder, BID    ondansetron (ZOFRAN) injection 4 mg, Q6H PRN    pantoprazole (PROTONIX) EC tablet 40 mg, Daily    polyethylene glycol (MIRALAX) packet 17 g, Daily PRN    polyethylene glycol (MIRALAX) packet 17 g, Daily    senna (SENOKOT) tablet 17.2 mg, HS    torsemide (DEMADEX) tablet 30 mg, BID (diuretic)    white petrolatum-mineral oil (EUCERIN,HYDROCERIN) cream, Daily    Administrative Statements   Today, Patient Was Seen By: Naveen Ramon PA-C      **Please Note: This note may have been constructed using a voice recognition system.**

## 2025-01-22 NOTE — ASSESSMENT & PLAN NOTE
Initially requiring 2 L nasal cannula to maintain above 88%  Pt reports she is not on oxygen at home  Chest x-ray clear without evidence of pneumonia  Now on room air and PO diuretics

## 2025-01-22 NOTE — CASE MANAGEMENT
Case Management Discharge Planning Note    Patient name Mattie Varela  Location East 5 /E5 -* MRN [id number]  : 1945 Date 2025       Current Admission Date: 2025  Current Admission Diagnosis:Acute on chronic diastolic congestive heart failure (HCC)   Patient Active Problem List    Diagnosis Date Noted Date Diagnosed    Hyponatremia 2025     Stage 3b chronic kidney disease (HCC) 2024     Insomnia 2024     Patella fracture 2024     Gastro-esophageal reflux disease without esophagitis 10/19/2024     Constipation 2024     Acute on chronic diastolic congestive heart failure (HCC) 2024     s/p Medtronic loop recorder 3/2/2024 2024     Moderate protein-calorie malnutrition ([facility]) 2024     Left ventricular outflow obstruction 2024     Obesity, Class I, BMI 30-34.9 2024     Urinary retention 2023     Non obstructive CAD 11/15/2023     Acute respiratory failure with hypoxia ([facility]) 11/10/2023     Chronic obstructive pulmonary disease, unspecified COPD type ([facility]) 2023     Confusion with body shakes and blank stare 2023     Anemia in stage 3b chronic kidney disease  ([facility]) 2023     Cerebrovascular accident (CVA), unspecified mechanism ([facility]) 2022     Prepyloric ulcer 2021     Mild intermittent asthma without complication 2020     S/P insertion of spinal cord stimulator 2018     Iron deficiency anemia secondary to inadequate dietary iron intake 2018     Type 2 diabetes mellitus with other skin complications ([facility])      Hypothyroidism 2017     Glaucoma 2017     Hypercholesterolemia 2016     Anxiety 2015     Chronic pain disorder 2013     Hypertension 2013       LOS (days): 2  Geometric Mean LOS (GMLOS) (days):   Days to GMLOS:     OBJECTIVE:  Risk of Unplanned Readmission Score: 44.48         Current admission status: Inpatient   Preferred Pharmacy:    [facility] - [place], [place] - [address]  [address]  [address]  [facility] [address]  Phone: [phone] Fax: [phone]    [facility] [place] [place] - [address]  [facility],  [address]  [facility],  First Floor [place]  [facility] [address]  Phone: [phone] Fax: [phone]    [facility] - [place] - [address]  [address]  [place] [address]  Phone: [phone] Fax: [phone]    Primary Care Provider: NANY Pollock    Primary Insurance: [facility]  Secondary Insurance:     DISCHARGE DETAILS:               Additional Comments: Patient wants wheel chair van transport home. CM unable to reach daughter made 2 attempts. WCV ordered for tomorrow at 11am.

## 2025-01-22 NOTE — PHYSICAL THERAPY NOTE
PHYSICAL THERAPY EVALUATION          Patient Name: Mattie Varela  Today's Date: 1/22/2025 01/22/25 1140   PT Last Visit   PT Visit Date 01/22/25   Note Type   Note type Evaluation   Pain Assessment   Pain Assessment Tool 0-10   Pain Score 8   Pain Location/Orientation Location: Back   Pain Onset/Description Frequency: Constant/Continuous  (chronic pain)   Hospital Pain Intervention(s) Emotional support   Restrictions/Precautions   Other Precautions Contact/isolation;Chair Alarm;Bed Alarm;Multiple lines;Fall Risk;Pain   Home Living   Type of Home Assisted living   Home Layout One level   Home Equipment Walker;Life alert   Additional Comments sleeps in recliner   Prior Function   Level of Camp Independent with ADLs;Independent with functional mobility;Needs assistance with ADLs;Needs assistance with IADLS   Lives With Facility staff   Receives Help From Home health;Other (Comment)  (staff. getting PT x3/wk)   Falls in the last 6 months 1 to 4   Comments per patient: gets A to shower otherwise indep for ADLs. uses RW to ambulate. admits to being mostly sedentary. working w PT to be able to walk to dining hood by herself   General   Additional Pertinent History pt admitted 1/19/25 for acute on chronic CHF. PMHx significant for obesity, T2DM, chronic pain disorder, glaucoma, CKD, COPD, CHF, anxiety, s/p spinal cord stimulator, CVA   Cognition   Overall Cognitive Status WFL   Arousal/Participation Cooperative   Orientation Level Oriented to person;Oriented to place;Oriented to time   Memory Decreased recall of recent events   Following Commands Follows one step commands without difficulty   Subjective   Subjective pt w complaints of nausea   Transfers   Sit to Stand 5  Supervision   Additional items Armrests;Increased time required;Other  (RW)   Stand to Sit 5  Supervision   Additional items Armrests;Increased time required;Other  (RW)   Ambulation/Elevation   Gait pattern Improper Weight  shift;Forward Flexion;Decreased foot clearance;Short stride;Excessively slow   Gait Assistance 5  Supervision   Additional items Assist x 1   Assistive Device Rolling walker   Distance 35-40'   Balance   Static Standing Fair -   Dynamic Standing Fair -   Ambulatory Fair -   Endurance Deficit   Endurance Deficit No   Activity Tolerance   Activity Tolerance Treatment limited secondary to medical complications (Comment)  (nausea)   Medical Staff Made Aware CM   Assessment   Prognosis Fair   Problem List Decreased strength;Impaired balance;Decreased mobility;Pain   Assessment Mattie Varela is a 79 y.o. female admitted to [facility] on 1/19/2025 for Acute on chronic diastolic congestive heart failure (HCC). PT was consulted and pt was seen on 1/22/2025 for mobility assessment and d/c planning. Pt presents w contact isolation, multiple lines, chronic pain, fall risk. At baseline ambulates w RW. She reports working w PT to try to be able to walk to the dining hood by herself. It appears pt mobilizes independently in her room. Pt is currently functioning at a S for transfers and ambulation w RW. Pt demonstrated ability to ambulate limited household distances. Decline further mobility dt complaints of nausea. Did observe one episode of LOB w initial ambulation which pt attributes to not feeling well. Require CGA for correction of balance/ safety at that time. No further episodes of instability noted. Will maintain on caseload to progress mobility during hospital stay and ensure appropriate dc plan. The patient's AM-PAC Basic Mobility Inpatient Short Form Raw Score is 19. A Raw score of greater than 16 suggests the patient may benefit from discharge to home.   Barriers to Discharge None   Goals   Patient Goals be able to walk to dining hood alone   STG Expiration Date 02/01/25   Short Term Goal #1 1) Pt will perform bed mobility S demonstrating appropriate technique 100% of the time in order to improve function. 2) Pt will  perform all transfers mod I demonstrating safe technique 100% of the time in order to improve ability to negotiate safely in home environment. 3) Amb with least restrictive AD > 100'x1 with mod I in order to demonstrate ability to negotiate in home environment. 4) Improve overall strength and balance 1/2 grade in order to optimize ability to perform functional tasks and reduce fall risk. 5) Improve am-pac by 2 to demonstrate functional progress and return to baseline function/reduced caregiver burden.   Plan   Treatment/Interventions Functional transfer training;LE strengthening/ROM;Therapeutic exercise;Patient/family training;Equipment eval/education;Bed mobility;Compensatory technique education;Gait training;Spoke to case management   PT Frequency 1-2x/wk   Discharge Recommendation   Rehab Resource Intensity Level, PT III (Minimum Resource Intensity)  (cont HH PT at facility)   AM-PAC Basic Mobility Inpatient   Turning in Flat Bed Without Bedrails 3   Lying on Back to Sitting on Edge of Flat Bed Without Bedrails 3   Moving Bed to Chair 3   Standing Up From Chair Using Arms 4   Walk in Room 3   Climb 3-5 Stairs With Railing 3   Basic Mobility Inpatient Raw Score 19   Basic Mobility Standardized Score 42.48   [facility] Highest Level Of Mobility   -HLM Goal 6: Walk 10 steps or more   -HLM Achieved 7: Walk 25 feet or more   End of Consult   Patient Position at End of Consult Bedside chair;All needs within reach;Bed/Chair alarm activated     History: co - morbidities including age, social background, use of assistive device, assist for showers/ iadl's, current experience including fall risk, multiple lines, contact isolation  Exam: impairments in systems including multiple body structures involved; neuromuscular (balance, gait, transfers), cognition; activity limitations  (difficulties executing an action); participation restrictions (problems associated w involvement in life situations), AM-PAC  Clinical:  stable/unpredictable  Complexity: moderate    Orquidea Kohli, PT

## 2025-01-22 NOTE — ASSESSMENT & PLAN NOTE
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  Draining yellow urine.   It is unclear if patient was started on antibiotic at [facility], will discuss with CM to obtain med list  Continue outpatient follow-up with urology  Maintain urogenital care

## 2025-01-23 ENCOUNTER — TELEPHONE (OUTPATIENT)
Dept: CARDIOLOGY CLINIC | Facility: CLINIC | Age: 80
End: 2025-01-23

## 2025-01-23 VITALS
SYSTOLIC BLOOD PRESSURE: 145 MMHG | HEIGHT: 60 IN | HEART RATE: 76 BPM | TEMPERATURE: 97.8 F | DIASTOLIC BLOOD PRESSURE: 67 MMHG | WEIGHT: 171.52 LBS | BODY MASS INDEX: 33.67 KG/M2 | RESPIRATION RATE: 16 BRPM | OXYGEN SATURATION: 97 %

## 2025-01-23 PROBLEM — R09.81 CONGESTION OF NASAL SINUS: Status: ACTIVE | Noted: 2025-01-23

## 2025-01-23 LAB
ANION GAP SERPL CALCULATED.3IONS-SCNC: 10 MMOL/L (ref 4–13)
BUN SERPL-MCNC: 20 MG/DL (ref 5–25)
CALCIUM SERPL-MCNC: 9 MG/DL (ref 8.4–10.2)
CHLORIDE SERPL-SCNC: 92 MMOL/L (ref 96–108)
CO2 SERPL-SCNC: 38 MMOL/L (ref 21–32)
CREAT SERPL-MCNC: 1.54 MG/DL (ref 0.6–1.3)
ERYTHROCYTE [DISTWIDTH] IN BLOOD BY AUTOMATED COUNT: 15.1 % (ref 11.6–15.1)
GFR SERPL CREATININE-BSD FRML MDRD: 31 ML/MIN/1.73SQ M
GLUCOSE SERPL-MCNC: 129 MG/DL (ref 65–140)
GLUCOSE SERPL-MCNC: 138 MG/DL (ref 65–140)
GLUCOSE SERPL-MCNC: 191 MG/DL (ref 65–140)
HCT VFR BLD AUTO: 33.4 % (ref 34.8–46.1)
HGB BLD-MCNC: 10.2 G/DL (ref 11.5–15.4)
MCH RBC QN AUTO: 27.4 PG (ref 26.8–34.3)
MCHC RBC AUTO-ENTMCNC: 30.5 G/DL (ref 31.4–37.4)
MCV RBC AUTO: 90 FL (ref 82–98)
PLATELET # BLD AUTO: 381 THOUSANDS/UL (ref 149–390)
PMV BLD AUTO: 9.4 FL (ref 8.9–12.7)
POTASSIUM SERPL-SCNC: 3.5 MMOL/L (ref 3.5–5.3)
RBC # BLD AUTO: 3.72 MILLION/UL (ref 3.81–5.12)
SODIUM SERPL-SCNC: 140 MMOL/L (ref 135–147)
WBC # BLD AUTO: 9.62 THOUSAND/UL (ref 4.31–10.16)

## 2025-01-23 PROCEDURE — 99239 HOSP IP/OBS DSCHRG MGMT >30: CPT | Performed by: PHYSICIAN ASSISTANT

## 2025-01-23 PROCEDURE — 85027 COMPLETE CBC AUTOMATED: CPT | Performed by: PHYSICIAN ASSISTANT

## 2025-01-23 PROCEDURE — 99232 SBSQ HOSP IP/OBS MODERATE 35: CPT | Performed by: STUDENT IN AN ORGANIZED HEALTH CARE EDUCATION/TRAINING PROGRAM

## 2025-01-23 PROCEDURE — 82948 REAGENT STRIP/BLOOD GLUCOSE: CPT

## 2025-01-23 PROCEDURE — 80048 BASIC METABOLIC PNL TOTAL CA: CPT | Performed by: PHYSICIAN ASSISTANT

## 2025-01-23 RX ORDER — MAGNESIUM SULFATE HEPTAHYDRATE 40 MG/ML
2 INJECTION, SOLUTION INTRAVENOUS ONCE
Status: COMPLETED | OUTPATIENT
Start: 2025-01-23 | End: 2025-01-23

## 2025-01-23 RX ORDER — POTASSIUM CHLORIDE 1500 MG/1
40 TABLET, EXTENDED RELEASE ORAL ONCE
Status: COMPLETED | OUTPATIENT
Start: 2025-01-23 | End: 2025-01-23

## 2025-01-23 RX ORDER — OXYMETAZOLINE HYDROCHLORIDE 0.05 G/100ML
2 SPRAY NASAL EVERY 12 HOURS PRN
Qty: 6 ML | Refills: 0
Start: 2025-01-23 | End: 2025-01-26

## 2025-01-23 RX ORDER — PSEUDOEPHEDRINE HCL 120 MG/1
120 TABLET, FILM COATED, EXTENDED RELEASE ORAL ONCE
Status: DISCONTINUED | OUTPATIENT
Start: 2025-01-23 | End: 2025-01-23 | Stop reason: HOSPADM

## 2025-01-23 RX ORDER — ONDANSETRON 4 MG/1
4 TABLET, FILM COATED ORAL EVERY 8 HOURS PRN
Qty: 20 TABLET | Refills: 0
Start: 2025-01-23

## 2025-01-23 RX ADMIN — METHOCARBAMOL 750 MG: 750 TABLET ORAL at 12:15

## 2025-01-23 RX ADMIN — ACETAMINOPHEN 975 MG: 325 TABLET, FILM COATED ORAL at 05:16

## 2025-01-23 RX ADMIN — MAGNESIUM SULFATE HEPTAHYDRATE 2 G: 40 INJECTION, SOLUTION INTRAVENOUS at 10:25

## 2025-01-23 RX ADMIN — ASPIRIN 81 MG CHEWABLE TABLET 81 MG: 81 TABLET CHEWABLE at 08:07

## 2025-01-23 RX ADMIN — MORPHINE SULFATE 15 MG: 15 TABLET ORAL at 08:07

## 2025-01-23 RX ADMIN — FENTANYL 1 PATCH: 50 PATCH, EXTENDED RELEASE TRANSDERMAL at 10:29

## 2025-01-23 RX ADMIN — ONDANSETRON 4 MG: 2 INJECTION INTRAMUSCULAR; INTRAVENOUS at 12:02

## 2025-01-23 RX ADMIN — Medication: at 08:07

## 2025-01-23 RX ADMIN — LEVOTHYROXINE SODIUM 37.5 MCG: 75 TABLET ORAL at 05:16

## 2025-01-23 RX ADMIN — PANTOPRAZOLE SODIUM 40 MG: 40 TABLET, DELAYED RELEASE ORAL at 08:08

## 2025-01-23 RX ADMIN — METHOCARBAMOL 750 MG: 750 TABLET ORAL at 03:57

## 2025-01-23 RX ADMIN — HEPARIN SODIUM 5000 UNITS: 5000 INJECTION INTRAVENOUS; SUBCUTANEOUS at 05:16

## 2025-01-23 RX ADMIN — FERROUS SULFATE TAB 325 MG (65 MG ELEMENTAL FE) 325 MG: 325 (65 FE) TAB at 08:07

## 2025-01-23 RX ADMIN — TORSEMIDE 30 MG: 10 TABLET ORAL at 08:07

## 2025-01-23 RX ADMIN — FLUTICASONE PROPIONATE 2 SPRAY: 50 SPRAY, METERED NASAL at 08:07

## 2025-01-23 RX ADMIN — METOPROLOL SUCCINATE 12.5 MG: 25 TABLET, EXTENDED RELEASE ORAL at 08:07

## 2025-01-23 RX ADMIN — MORPHINE SULFATE 15 MG: 15 TABLET ORAL at 00:45

## 2025-01-23 RX ADMIN — GUAIFENESIN 600 MG: 600 TABLET, EXTENDED RELEASE ORAL at 08:08

## 2025-01-23 RX ADMIN — LIDOCAINE 1 PATCH: 50 PATCH TOPICAL at 08:07

## 2025-01-23 RX ADMIN — INSULIN LISPRO 2 UNITS: 100 INJECTION, SOLUTION INTRAVENOUS; SUBCUTANEOUS at 12:03

## 2025-01-23 RX ADMIN — POTASSIUM CHLORIDE 40 MEQ: 1500 TABLET, EXTENDED RELEASE ORAL at 10:25

## 2025-01-23 RX ADMIN — NYSTATIN: 100000 POWDER TOPICAL at 08:07

## 2025-01-23 RX ADMIN — DOCUSATE SODIUM 100 MG: 100 CAPSULE, LIQUID FILLED ORAL at 08:08

## 2025-01-23 RX ADMIN — FAMOTIDINE 20 MG: 20 TABLET, FILM COATED ORAL at 08:08

## 2025-01-23 RX ADMIN — POLYETHYLENE GLYCOL 3350 17 G: 17 POWDER, FOR SOLUTION ORAL at 08:07

## 2025-01-23 RX ADMIN — Medication 400 MG: at 08:07

## 2025-01-23 NOTE — ASSESSMENT & PLAN NOTE
Lab Results   Component Value Date    HGBA1C 7.4 (H) 10/12/2024     Recent Labs     01/22/25  1625 01/22/25  2042 01/23/25  0717 01/23/25  1128   POCGLU 167* 132 138 191*     Holding home metformin, A1c near goal  Continue sliding scale insulin, start level 3 diabetic diet

## 2025-01-23 NOTE — PLAN OF CARE
Problem: PAIN - ADULT  Goal: Verbalizes/displays adequate comfort level or baseline comfort level  Description: Interventions:  - Encourage patient to monitor pain and request assistance  - Assess pain using appropriate pain scale  - Administer analgesics based on type and severity of pain and evaluate response  - Implement non-pharmacological measures as appropriate and evaluate response  - Consider cultural and social influences on pain and pain management  - Notify physician/advanced practitioner if interventions unsuccessful or patient reports new pain  Outcome: Progressing     Problem: INFECTION - ADULT  Goal: Absence or prevention of progression during hospitalization  Description: INTERVENTIONS:  - Assess and monitor for signs and symptoms of infection  - Monitor lab/diagnostic results  - Monitor all insertion sites, i.e. indwelling lines, tubes, and drains  - Monitor endotracheal if appropriate and nasal secretions for changes in amount and color  - [place] appropriate cooling/warming therapies per order  - Administer medications as ordered  - Instruct and encourage patient and family to use good hand hygiene technique  - Identify and instruct in appropriate isolation precautions for identified infection/condition  Outcome: Progressing  Goal: Absence of fever/infection during neutropenic period  Description: INTERVENTIONS:  - Monitor WBC    Outcome: Progressing     Problem: SAFETY ADULT  Goal: Patient will remain free of falls  Description: INTERVENTIONS:  - Educate patient/family on patient safety including physical limitations  - Instruct patient to call for assistance with activity   - Consult OT/PT to assist with strengthening/mobility   - Keep Call bell within reach  - Keep bed low and locked with side rails adjusted as appropriate  - Keep care items and personal belongings within reach  - Initiate and maintain comfort rounds  - Make Fall Risk Sign visible to staff  - Apply yellow socks and bracelet  for high fall risk patients  - Consider moving patient to room near nurses station  Outcome: Progressing  Goal: Maintain or return to baseline ADL function  Description: INTERVENTIONS:  -  Assess patient's ability to carry out ADLs; assess patient's baseline for ADL function and identify physical deficits which impact ability to perform ADLs (bathing, care of mouth/teeth, toileting, grooming, dressing, etc.)  - Assess/evaluate cause of self-care deficits   - Assess range of motion  - Assess patient's mobility; develop plan if impaired  - Assess patient's need for assistive devices and provide as appropriate  - Encourage maximum independence but intervene and supervise when necessary  - Involve family in performance of ADLs  - Assess for home care needs following discharge   - Consider OT consult to assist with ADL evaluation and planning for discharge  - Provide patient education as appropriate  Outcome: Progressing  Goal: Maintains/Returns to pre admission functional level  Description: INTERVENTIONS:  - Perform AM-PAC 6 Click Basic Mobility/ Daily Activity assessment daily.  - Set and communicate daily mobility goal to care team and patient/family/caregiver.   - Collaborate with rehabilitation services on mobility goals if consulted  - Out of bed for toileting  - Record patient progress and toleration of activity level   Outcome: Progressing     Problem: DISCHARGE PLANNING  Goal: Discharge to home or other facility with appropriate resources  Description: INTERVENTIONS:  - Identify barriers to discharge w/patient and caregiver  - Arrange for needed discharge resources and transportation as appropriate  - Identify discharge learning needs (meds, wound care, etc.)  - Arrange for interpretive services to assist at discharge as needed  - Refer to Case Management Department for coordinating discharge planning if the patient needs post-hospital services based on physician/advanced practitioner order or complex needs  related to functional status, cognitive ability, or social support system  Outcome: Progressing     Problem: Knowledge Deficit  Goal: Patient/family/caregiver demonstrates understanding of disease process, treatment plan, medications, and discharge instructions  Description: Complete learning assessment and assess knowledge base.  Interventions:  - Provide teaching at level of understanding  - Provide teaching via preferred learning methods  Outcome: Progressing     Problem: RESPIRATORY - ADULT  Goal: Achieves optimal ventilation and oxygenation  Description: INTERVENTIONS:  - Assess for changes in respiratory status  - Assess for changes in mentation and behavior  - Position to facilitate oxygenation and minimize respiratory effort  - Oxygen administered by appropriate delivery if ordered  - Initiate smoking cessation education as indicated  - Encourage broncho-pulmonary hygiene including cough, deep breathe, Incentive Spirometry  - Assess the need for suctioning and aspirate as needed  - Assess and instruct to report SOB or any respiratory difficulty  - Respiratory Therapy support as indicated  Outcome: Progressing     Problem: SKIN/TISSUE INTEGRITY - ADULT  Goal: Skin Integrity remains intact(Skin Breakdown Prevention)  Description: Assess:  -Perform Jack assessment every   -Clean and moisturize skin every   -Inspect skin when repositioning, toileting, and assisting with ADLS  -Assess under medical devices such as  every   -Assess extremities for adequate circulation and sensation     Bed Management:  -Have minimal linens on bed & keep smooth, unwrinkled  -Change linens as needed when moist or perspiring  -Avoid sitting or lying in one position for more than  hours while in bed  -Keep HOB at degrees     Toileting:  -Offer bedside commode  -Assess for incontinence every   -Use incontinent care products after each incontinent episode such as wipes and pads and hydraguard cream    Activity:  -Mobilize patient   times a day  -Encourage activity and walks on unit  -Encourage or provide ROM exercises   -Turn and reposition patient every  Hours  -Use appropriate equipment to lift or move patient in bed  -Instruct/ Assist with weight shifting every  when out of bed in chair  -Consider limitation of chair time  hour intervals    Skin Care:  -Avoid use of baby powder, tape, friction and shearing, hot water or constrictive clothing  -Relieve pressure over bony prominences using   -Do not massage red bony areas    Next Steps:  -Teach patient strategies to minimize risks such as    -Consider consults to  interdisciplinary teams such as   Outcome: Progressing     Problem: Prexisting or High Potential for Compromised Skin Integrity  Goal: Skin integrity is maintained or improved  Description: INTERVENTIONS:  - Identify patients at risk for skin breakdown  - Assess and monitor skin integrity  - Assess and monitor nutrition and hydration status  - Monitor labs   - Assess for incontinence   - Turn and reposition patient  - Assist with mobility/ambulation  - Relieve pressure over bony prominences  - Avoid friction and shearing  - Provide appropriate hygiene as needed including keeping skin clean and dry  - Evaluate need for skin moisturizer/barrier cream  - Collaborate with interdisciplinary team   - Patient/family teaching  - Consider wound care consult   Outcome: Progressing

## 2025-01-23 NOTE — DISCHARGE SUMMARY
Discharge Summary - Hospitalist   Name: Mattie Varela 79 y.o. female I MRN: [id number]  Unit/Bed#: E5 -01 I Date of Admission: 1/19/2025   Date of Service: 1/23/2025 I Hospital Day: 3     Assessment & Plan  Acute on chronic diastolic congestive heart failure (HCC)  Wt Readings from Last 3 Encounters:   01/23/25 77.8 kg (171 lb 8.3 oz)   12/20/24 81.2 kg (179 lb)   12/02/24 81.3 kg (179 lb 3.7 oz)     Presented with increasing weight, bilateral lower extremity edema and decreased urine output via EMS  PTA on torsemide 30 mg daily, notes she follows a sodium fluid restricted diet at [facility] her [facility]  TTE 11/2024 reviewed LVEF 51%, unable to assess diastolic function  Chest x-ray clear although BNP elevated at 222 and patient with hypervolemic hyponatremia  With increasing weight, dyspnea, lower extremity edema. CXR reviewed no acute cardiopulmonary disease  Continue monitoring intake and output, daily weights  Discharged on torsemide 30 mg BID  Acute respiratory failure with hypoxia (HCC)  Initially requiring 2 L nasal cannula to maintain above 88%  Pt reports she is not on oxygen at home  Chest x-ray clear without evidence of pneumonia  Now on room air and PO diuretics  Stage 3b chronic kidney disease (HCC)  Lab Results   Component Value Date    EGFR 31 01/23/2025    EGFR 33 01/22/2025    EGFR 34 01/21/2025    CREATININE 1.54 (H) 01/23/2025    CREATININE 1.46 (H) 01/22/2025    CREATININE 1.42 (H) 01/21/2025     Baseline creatinine around 1.3-1.5, remains at baseline, monitor with diuresis  Type 2 diabetes mellitus with other skin complications (HCC)  Lab Results   Component Value Date    HGBA1C 7.4 (H) 10/12/2024     Recent Labs     01/22/25  1625 01/22/25  2042 01/23/25  0717 01/23/25  1128   POCGLU 167* 132 138 191*     Holding home metformin, A1c near goal  Continue sliding scale insulin, start level 3 diabetic diet  Urinary retention  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  Draining yellow urine.   It is unclear if patient was started on antibiotic at [facility], will discuss with CM to obtain med list  Continue outpatient follow-up with urology  Maintain urogenital care  Anemia in stage 3b chronic kidney disease  (HCC)  Baseline hemoglobin around 9-10, stable at 8.9  No she had a bowel movement 2 days ago that was not dark or bloody  No gross signs of bleeding on exam  Update iron panel  Hyponatremia  Hypervolemic hyponatremia suspect in setting of volume overload, with appropriate rise over 24 hours and now resolved  Monitor closely with diuretics  Chronic pain disorder  With history of chronic pain disorder status post spinal stimulator with degenerative lumbar spinal stenosis, was transitioned to oral hydromorphone and now is on morphine sulfate  Per PDMP patient is on morphine sulfate 15 mg every 6 hours for severe pain, last filled 1/13/2025  Patient is also on fentanyl 50 mcg an hour patch every third day.  Last applied 1/16/2025, will change today  Continue muscle relaxer with Robaxin as needed, gabapentin qhs prn, tylenol scheduled  Glaucoma  Continue home eyedrops  Hypothyroidism  Continue levothyroxine, check TSH  Patella fracture  With history of patella fracture after she fell at rehab in November  Recently saw orthopedics in December 2024  Cleared to ambulate with walker at baseline  PT OT eval's  Insomnia  Previously taken off Ambien, continue melatonin prn qhs  Gastro-esophageal reflux disease without esophagitis  Patient had an ulcer in 2022, denies any dark or bloody stools.  Does deal with some reflux and epigastric discomfort from time to time.  Continue Pepcid and Protonix  Constipation  Narcotic induced, continue home bowel regimen  Last noted bowel movement 2 days ago, not dark or bloody  Monitor output  Chronic  obstructive pulmonary disease, unspecified COPD type (HCC)  With outpatient diagnosis of COPD, unclear severity.  No evidence exacerbation. Continue albuterol prn       Medical Problems       Resolved Problems  Date Reviewed: 11/29/2024   None       Discharging Physician / Practitioner: Naveen Ramon PA-C  PCP: NANY Pollock  Admission Date:   Admission Orders (From admission, onward)       Ordered        01/20/25 1409  INPATIENT ADMISSION  Once            01/19/25 0436  Place in Observation  Once                          Discharge Date: 01/23/25    Consultations During Hospital Stay:  Cardiology    Procedures Performed:   None    Significant Findings / Test Results:   XR chest 1 view portable  Result Date: 1/19/2025  Impression: No acute cardiopulmonary disease. Workstation performed: [id number]     On admission: Increased weights, dyspnea  Na+ 126 on admission  Net -7 L    Incidental Findings:   None    Test Results Pending at Discharge (will require follow up):   None     Outpatient Tests Requested:  None    Complications: None    Reason for Admission: Shortness of breath    Hospital Course:   Mattie Varela is a 79 y.o. female patient with PMH CVA, HTN, HFpEF, CAD, CKD, T2DM who originally presented to the hospital on 1/19/2025 due to bilateral lower extremity swelling, dyspnea, cough.  Worsening over the the few days prior to admission.  Was found to be in acute heart failure exacerbation.  Cardiology was consulted.  Patient underwent IV diuresis.  On admission, weight 187 pounds.  On discharge weighs 171 pounds.  She is on room air.  She is able to ambulate with minimal dyspnea which is her baseline.  She is stable for discharge with outpatient follow-up with cardiology.      Please see above list of diagnoses and related plan for additional information.     Condition at Discharge: stable    Discharge Day Visit / Exam:   Subjective: No acute events overnight.  Patient did report mild tremor and  some congestion today.  Vitals: Blood Pressure: 145/67 (01/23/25 0712)  Pulse: 76 (01/22/25 2243)  Temperature: 97.8 °F (36.6 °C) (01/22/25 2243)  Temp Source: Oral (01/22/25 2243)  Respirations: 16 (01/23/25 0712)  Height: 5' (152.4 cm) (01/19/25 0847)  Weight - Scale: 77.8 kg (171 lb 8.3 oz) (01/23/25 1049)  SpO2: 97 % (01/22/25 2243)  Physical Exam  Vitals and nursing note reviewed.   Constitutional:       Appearance: Normal appearance.   HENT:      Head: Normocephalic and atraumatic.      Mouth/Throat:      Mouth: Mucous membranes are moist.      Pharynx: Oropharynx is clear. No oropharyngeal exudate.   Eyes:      Extraocular Movements: Extraocular movements intact.   Cardiovascular:      Rate and Rhythm: Normal rate and regular rhythm.      Pulses: Normal pulses.      Heart sounds: Normal heart sounds. No murmur heard.     No friction rub. No gallop.   Pulmonary:      Effort: Pulmonary effort is normal. No respiratory distress.      Breath sounds: Normal breath sounds. No stridor. No wheezing or rales.   Abdominal:      General: Abdomen is flat. Bowel sounds are normal. There is no distension.      Palpations: Abdomen is soft.      Tenderness: There is no abdominal tenderness.   Musculoskeletal:      Right lower leg: No edema.      Left lower leg: No edema.   Skin:     General: Skin is warm and dry.   Neurological:      General: No focal deficit present.      Mental Status: She is alert and oriented to person, place, and time.          Discussion with Family: Updated  (daughter) via phone.    Discharge instructions/Information to patient and family:   See after visit summary for information provided to patient and family.      Provisions for Follow-Up Care:  See after visit summary for information related to follow-up care and any pertinent home health orders.      Mobility at time of Discharge:   Basic Mobility Inpatient Raw Score: 19  JH-HLM Goal: 6: Walk 10 steps or more  JH-HLM Achieved: 6:  Walk 10 steps or more  HLM Goal achieved. Continue to encourage appropriate mobility.     Disposition:   Assisted Living Facility at [facility]    Planned Readmission: None    Discharge Medications:  See after visit summary for reconciled discharge medications provided to patient and/or family.      Administrative Statements   Discharge Statement:  I have spent a total time of 45 minutes in caring for this patient on the day of the visit/encounter. .    **Please Note: This note may have been constructed using a voice recognition system**

## 2025-01-23 NOTE — ASSESSMENT & PLAN NOTE
Lab Results   Component Value Date    EGFR 31 01/23/2025    EGFR 33 01/22/2025    EGFR 34 01/21/2025    CREATININE 1.54 (H) 01/23/2025    CREATININE 1.46 (H) 01/22/2025    CREATININE 1.42 (H) 01/21/2025     Baseline creatinine around 1.3-1.5, remains at baseline, monitor with diuresis

## 2025-01-23 NOTE — PLAN OF CARE
Problem: PAIN - ADULT  Goal: Verbalizes/displays adequate comfort level or baseline comfort level  Description: Interventions:  - Encourage patient to monitor pain and request assistance  - Assess pain using appropriate pain scale  - Administer analgesics based on type and severity of pain and evaluate response  - Implement non-pharmacological measures as appropriate and evaluate response  - Consider cultural and social influences on pain and pain management  - Notify physician/advanced practitioner if interventions unsuccessful or patient reports new pain  Outcome: Progressing     Problem: INFECTION - ADULT  Goal: Absence or prevention of progression during hospitalization  Description: INTERVENTIONS:  - Assess and monitor for signs and symptoms of infection  - Monitor lab/diagnostic results  - Monitor all insertion sites, i.e. indwelling lines, tubes, and drains  - Monitor endotracheal if appropriate and nasal secretions for changes in amount and color  - [place] appropriate cooling/warming therapies per order  - Administer medications as ordered  - Instruct and encourage patient and family to use good hand hygiene technique  - Identify and instruct in appropriate isolation precautions for identified infection/condition  Outcome: Progressing  Goal: Absence of fever/infection during neutropenic period  Description: INTERVENTIONS:  - Monitor WBC    Outcome: Progressing     Problem: SAFETY ADULT  Goal: Patient will remain free of falls  Description: INTERVENTIONS:  - Educate patient/family on patient safety including physical limitations  - Instruct patient to call for assistance with activity   - Consult OT/PT to assist with strengthening/mobility   - Keep Call bell within reach  - Keep bed low and locked with side rails adjusted as appropriate  - Keep care items and personal belongings within reach  - Initiate and maintain comfort rounds  - Make Fall Risk Sign visible to staff  - Apply yellow socks and bracelet  for high fall risk patients  - Consider moving patient to room near nurses station  Outcome: Progressing  Goal: Maintain or return to baseline ADL function  Description: INTERVENTIONS:  -  Assess patient's ability to carry out ADLs; assess patient's baseline for ADL function and identify physical deficits which impact ability to perform ADLs (bathing, care of mouth/teeth, toileting, grooming, dressing, etc.)  - Assess/evaluate cause of self-care deficits   - Assess range of motion  - Assess patient's mobility; develop plan if impaired  - Assess patient's need for assistive devices and provide as appropriate  - Encourage maximum independence but intervene and supervise when necessary  - Involve family in performance of ADLs  - Assess for home care needs following discharge   - Consider OT consult to assist with ADL evaluation and planning for discharge  - Provide patient education as appropriate  Outcome: Progressing  Goal: Maintains/Returns to pre admission functional level  Description: INTERVENTIONS:  - Perform AM-PAC 6 Click Basic Mobility/ Daily Activity assessment daily.  - Set and communicate daily mobility goal to care team and patient/family/caregiver.   - Collaborate with rehabilitation services on mobility goals if consulted  - Out of bed for toileting  - Record patient progress and toleration of activity level   Outcome: Progressing     Problem: DISCHARGE PLANNING  Goal: Discharge to home or other facility with appropriate resources  Description: INTERVENTIONS:  - Identify barriers to discharge w/patient and caregiver  - Arrange for needed discharge resources and transportation as appropriate  - Identify discharge learning needs (meds, wound care, etc.)  - Arrange for interpretive services to assist at discharge as needed  - Refer to Case Management Department for coordinating discharge planning if the patient needs post-hospital services based on physician/advanced practitioner order or complex needs  related to functional status, cognitive ability, or social support system  Outcome: Progressing     Problem: Knowledge Deficit  Goal: Patient/family/caregiver demonstrates understanding of disease process, treatment plan, medications, and discharge instructions  Description: Complete learning assessment and assess knowledge base.  Interventions:  - Provide teaching at level of understanding  - Provide teaching via preferred learning methods  Outcome: Progressing     Problem: RESPIRATORY - ADULT  Goal: Achieves optimal ventilation and oxygenation  Description: INTERVENTIONS:  - Assess for changes in respiratory status  - Assess for changes in mentation and behavior  - Position to facilitate oxygenation and minimize respiratory effort  - Oxygen administered by appropriate delivery if ordered  - Initiate smoking cessation education as indicated  - Encourage broncho-pulmonary hygiene including cough, deep breathe, Incentive Spirometry  - Assess the need for suctioning and aspirate as needed  - Assess and instruct to report SOB or any respiratory difficulty  - Respiratory Therapy support as indicated  Outcome: Progressing     Problem: SKIN/TISSUE INTEGRITY - ADULT  Goal: Skin Integrity remains intact(Skin Breakdown Prevention)  Description: Assess:  -Perform Jack assessment every   -Clean and moisturize skin every   -Inspect skin when repositioning, toileting, and assisting with ADLS  -Assess under medical devices such as  every   -Assess extremities for adequate circulation and sensation     Bed Management:  -Have minimal linens on bed & keep smooth, unwrinkled  -Change linens as needed when moist or perspiring  -Avoid sitting or lying in one position for more than  hours while in bed  -Keep HOB at degrees     Toileting:  -Offer bedside commode  -Assess for incontinence every   -Use incontinent care products after each incontinent episode such as wipes and pads and hydraguard cream    Activity:  -Mobilize patient   times a day  -Encourage activity and walks on unit  -Encourage or provide ROM exercises   -Turn and reposition patient every  Hours  -Use appropriate equipment to lift or move patient in bed  -Instruct/ Assist with weight shifting every  when out of bed in chair  -Consider limitation of chair time  hour intervals    Skin Care:  -Avoid use of baby powder, tape, friction and shearing, hot water or constrictive clothing  -Relieve pressure over bony prominences using   -Do not massage red bony areas    Next Steps:  -Teach patient strategies to minimize risks such as    -Consider consults to  interdisciplinary teams such as   Outcome: Progressing     Problem: Prexisting or High Potential for Compromised Skin Integrity  Goal: Skin integrity is maintained or improved  Description: INTERVENTIONS:  - Identify patients at risk for skin breakdown  - Assess and monitor skin integrity  - Assess and monitor nutrition and hydration status  - Monitor labs   - Assess for incontinence   - Turn and reposition patient  - Assist with mobility/ambulation  - Relieve pressure over bony prominences  - Avoid friction and shearing  - Provide appropriate hygiene as needed including keeping skin clean and dry  - Evaluate need for skin moisturizer/barrier cream  - Collaborate with interdisciplinary team   - Patient/family teaching  - Consider wound care consult   Outcome: Progressing

## 2025-01-23 NOTE — PROGRESS NOTES
Progress Note - Cardiology   Name: Mattie Varela 79 y.o. female I MRN: [id number]  Unit/Bed#: E5 -01 I Date of Admission: 1/19/2025   Date of Service: 1/23/2025 I Hospital Day: 3     Assessment & Plan  Acute on chronic diastolic congestive heart failure (HCC)  -- Limited TTE 11/22/2024: EF 51%, septum is akinetic to paradoxical.  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  -- TTE 5/23/2024: LVEF 70%, grade 1 diastolic dysfunction, mild LVOT obstruction, moderate mitral annual calcification, mild tricuspid regurgitation.  -- Pharmacotherapy:  - Prehospital diuretic: Torsemide 30 mg twice daily with additional 20 mg as needed  - Prehospital beta-blocker: Toprol 12.5 mg daily  TODAY (01/23/25):   Patient weight remained stable today  Continue with torsemide 30 mg twice daily     Hyponatremia  -- Resolved    Stage 3b chronic kidney disease ([facility])  -- Stage IIIb chronic kidney disease.  Baseline creatinine suspected to be around 1.2-1.5  -- History chronic urinary retention overall renal function stable.    -- Has chronic urinary retention.  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  Chronic obstructive pulmonary disease, unspecified COPD type ([facility])  -- Without acute exacerbation  Patella fracture  -- With history of patella fracture after she fell at rehab in November  -- Seen by orthopedics in December 2024  -- Cleared to ambulate with walker at baseline  Chronic pain disorder  -- With history of chronic pain disorder status post spinal stimulator with degenerative lumbar spinal stenosis, was transitioned to oral hydromorphone and now is on morphine sulfate  -- Per  PDMP patient is on morphine sulfate 15 mg every 6 hours for severe pain, last filled 1/13/2025  -- Patient is also on fentanyl 50 mcg an hour patch every third day.  Previously applied 1/16/2025 --> changed 1/20/2025  Glaucoma    Hypothyroidism      Summary:  - Patient's weight remains stable and appears euvolemic on exam today  - Creatinine trended up slightly today, but similar to prior creatinine in December 2024  - Continue with torsemide 30 mg twice daily  - She is stable from a cardiac standpoint for discharge  - Will message our office to arrange an outpatient follow-up appointment    Subjective:   No significant events overnight.  She reports having some cramping sensation in her legs and having a mild headache.  Denies chest pain, shortness of breath, orthopnea, abdominal pain, nausea, vomiting, fever, chills, dizziness or palpitations.    Objective:     Vitals: Blood pressure 145/67, pulse 76, temperature 97.8 °F (36.6 °C), temperature source Oral, resp. rate 16, height 5' (1.524 m), weight 77.8 kg (171 lb 8.3 oz), SpO2 97%, not currently breastfeeding., Body mass index is 33.5 kg/m².,   Orthostatic Blood Pressures      Flowsheet Row Most Recent Value   Blood Pressure 145/67 filed at 01/23/2025 0712   Patient Position - Orthostatic VS Sitting filed at 01/23/2025 0712              Intake/Output Summary (Last 24 hours) at 1/23/2025 1118  Last data filed at 1/23/2025 0101  Gross per 24 hour   Intake --   Output 1400 ml   Net -1400 ml           Physical Exam:    GEN: Mattiesa CARLEY Varela appears well, alert and oriented x 3, pleasant and cooperative   HEENT: Mucous membranes moist, no scleral icterus, no conjunctival pallor  NECK: Trace JVD  HEART: Regular rate and rhythm, normal S1 and S2, 2/6 systolic murmur  LUNGS: Trace decreased breath sounds at bases bilaterally, otherwise clear to auscultation  ABDOMEN: normal bowel sounds, soft, no tenderness, no distention  EXTREMITIES: peripheral pulses normal; no lower  extremity edema   NEURO: no focal findings   SKIN: No lesions or rashes on exposed skin        Current Facility-Administered Medications:     acetaminophen (TYLENOL) tablet 975 mg, 975 mg, Oral, Q8H ARY, Jose Gaines PA-C, 975 mg at 01/23/25 0516    albuterol (PROVENTIL HFA,VENTOLIN HFA) inhaler 2 puff, 2 puff, Inhalation, Q6H PRN, Jose Gaines PA-C    aluminum-magnesium hydroxide-simethicone (MAALOX) oral suspension 30 mL, 30 mL, Oral, Q6H PRN, Brayden Thompson PA-C    aspirin chewable tablet 81 mg, 81 mg, Oral, Daily, Brayden Thompson PA-C, 81 mg at 01/23/25 0807    atorvastatin (LIPITOR) tablet 40 mg, 40 mg, Oral, Daily With Dinner, Brayden Thompson PA-C, 40 mg at 01/22/25 1745    docusate sodium (COLACE) capsule 100 mg, 100 mg, Oral, BID, Jose Gaines PA-C, 100 mg at 01/23/25 0808    famotidine (PEPCID) tablet 20 mg, 20 mg, Oral, Daily, Jose Gaines PA-C, 20 mg at 01/23/25 0808    fentaNYL (DURAGESIC) 50 mcg/hr TD 72 hr patch 1 patch, 1 patch, Transdermal, Q72H, Jose Gaines PA-C, 1 patch at 01/23/25 1029    ferrous sulfate tablet 325 mg, 325 mg, Oral, Daily With Breakfast, Jose Gaines PA-C, 325 mg at 01/23/25 0807    fluticasone (FLONASE) 50 mcg/act nasal spray 2 spray, 2 spray, Each Nare, Daily, Jose Gaines PA-C, 2 spray at 01/23/25 0807    gabapentin (NEURONTIN) capsule 300 mg, 300 mg, Oral, HS, Jose Gaines PA-C, 300 mg at 01/22/25 2007    guaiFENesin (MUCINEX) 12 hr tablet 600 mg, 600 mg, Oral, Q12H ARY, Jose Gaines PA-C, 600 mg at 01/23/25 0808    heparin (porcine) subcutaneous injection 5,000 Units, 5,000 Units, Subcutaneous, Q8H ARY, Brayden Thompson PA-C, 5,000 Units at 01/23/25 0516    insulin lispro (HumALOG/ADMELOG) 100 units/mL subcutaneous injection 1-6 Units, 1-6 Units, Subcutaneous, 4x Daily (AC & HS), 1 Units at 01/22/25 1746 **AND** Fingerstick Glucose (POCT), , , 4x Daily AC and at bedtime, Brayden Thompson PA-C    latanoprost (XALATAN) 0.005 % ophthalmic solution 1 drop, 1 drop, Both Eyes, HS,  Jose Gaines PA-C, 1 drop at 01/22/25 2243    levothyroxine tablet 37.5 mcg, 37.5 mcg, Oral, Early Morning, Brayden Thompson PA-C, 37.5 mcg at 01/23/25 0516    lidocaine (LIDODERM) 5 % patch 1 patch, 1 patch, Topical, Daily, Jose Gaines PA-C, 1 patch at 01/23/25 0807    magnesium Oxide (MAG-OX) tablet 400 mg, 400 mg, Oral, BID, Jose Gaines PA-C, 400 mg at 01/23/25 0807    magnesium sulfate 2 g/50 mL IVPB (premix) 2 g, 2 g, Intravenous, Once, Naveen Ramon PA-C, Last Rate: 25 mL/hr at 01/23/25 1025, 2 g at 01/23/25 1025    melatonin tablet 3 mg, 3 mg, Oral, HS PRN, Jose Gaines PA-C, 3 mg at 01/22/25 2244    methocarbamol (ROBAXIN) tablet 750 mg, 750 mg, Oral, Q8H PRN, Jose Gaines PA-C, 750 mg at 01/23/25 0357    metoprolol succinate (TOPROL-XL) 24 hr tablet 12.5 mg, 12.5 mg, Oral, Daily, Brayden Thompson PA-C, 12.5 mg at 01/23/25 0807    morphine (MSIR) IR tablet 15 mg, 15 mg, Oral, Q6H PRN, Brayden Thompson PA-C, 15 mg at 01/23/25 0807    nystatin (MYCOSTATIN) powder, , Topical, BID, Jose Gaines PA-C, Given at 01/23/25 0807    ondansetron (ZOFRAN) injection 4 mg, 4 mg, Intravenous, Q6H PRN, Brayden Thompson PA-C, 4 mg at 01/22/25 2259    pantoprazole (PROTONIX) EC tablet 40 mg, 40 mg, Oral, Daily, Jose Gaines PA-C, 40 mg at 01/23/25 0808    polyethylene glycol (MIRALAX) packet 17 g, 17 g, Oral, Daily PRN, Bryaden Thompson PA-C    polyethylene glycol (MIRALAX) packet 17 g, 17 g, Oral, Daily, Jose Gaines PA-C, 17 g at 01/23/25 0807    pseudoephedrine (SUDAFED) 12 hr tablet 120 mg, 120 mg, Oral, Once, Naveen Ramon PA-C    senna (SENOKOT) tablet 17.2 mg, 17.2 mg, Oral, HS, Jose Gaines PA-C, 17.2 mg at 01/22/25 2243    torsemide (DEMADEX) tablet 30 mg, 30 mg, Oral, BID (diuretic), Lamonte Sheehan PA-C, 30 mg at 01/23/25 0807    white petrolatum-mineral oil (EUCERIN,HYDROCERIN) cream, , Topical, Daily, Jose Gaines PA-C, Given at 01/23/25 0807    Labs & Results:    Lab Results   Component Value Date    CKTOTAL  50 05/26/2024    TROPONINI 0.04 07/16/2020    TROPONINI 0.06 (H) 07/15/2020       Lab Results   Component Value Date    GLUCOSE 193 (H) 06/04/2018    CALCIUM 9.0 01/23/2025     04/30/2015    K 3.5 01/23/2025    CO2 38 (H) 01/23/2025    CL 92 (L) 01/23/2025    BUN 20 01/23/2025    CREATININE 1.54 (H) 01/23/2025       Lab Results   Component Value Date    WBC 9.62 01/23/2025    HGB 10.2 (L) 01/23/2025    HCT 33.4 (L) 01/23/2025    MCV 90 01/23/2025     01/23/2025           Lab Results   Component Value Date    CHOL 165 04/30/2015    CHOL 208 03/20/2014     Lab Results   Component Value Date    HDL 39 (L) 11/23/2024    HDL 42 (L) 11/26/2023     Lab Results   Component Value Date    LDLCALC 44 11/23/2024    LDLCALC 42 11/26/2023     Lab Results   Component Value Date    TRIG 114 11/23/2024    TRIG 179 (H) 11/26/2023       Lab Results   Component Value Date    ALT 8 01/20/2025    AST 15 01/20/2025    ALKPHOS 101 01/20/2025         EKG personally reviewed by )Natan Guzman MD. No acute changes

## 2025-01-23 NOTE — UTILIZATION REVIEW
NOTIFICATION OF ADMISSION DISCHARGE   This is a Notification of Discharge from [facility]. Please be advised that this patient has been discharge from our facility. Below you will find the admission and discharge date and time including the patient’s disposition.   UTILIZATION REVIEW CONTACT:  Denisa Nava  Utilization   Network Utilization Review Department  Phone: [phone] x carefully listen to the prompts. All voicemails are confidential.  Email: [email or web address]     ADMISSION INFORMATION  PRESENTATION DATE: 1/19/2025  3:21 AM  OBERVATION ADMISSION DATE: 01/19/2025 0436  INPATIENT ADMISSION DATE: 1/20/25  2:09 PM   DISCHARGE DATE: 1/23/2025  1:16 PM   DISPOSITION:Home/Self Care    Network Utilization Review Department  ATTENTION: Please call with any questions or concerns to [phone] and carefully listen to the prompts so that you are directed to the right person. All voicemails are confidential.   For Discharge needs, contact Care Management DC Support Team at [phone] opt. 2  Send all requests for admission clinical reviews, approved or denied determinations and any other requests to dedicated fax number below belonging to the campus where the patient is receiving treatment. List of dedicated fax numbers for the Facilities:  FACILITY NAME UR FAX NUMBER   ADMISSION DENIALS (Administrative/Medical Necessity) [phone]   DISCHARGE SUPPORT TEAM ([facility]) [phone]   PARENT CHILD HEALTH (Maternity/NICU/Pediatrics) [phone]   [facility] [phone]   [facility] [phone]   Pending sale to [facility] [phone]   [facility] [phone]   [facility] [phone]   [facility] [phone]   [facility] [phone]   [facility]  [phone]   [facility] [phone]   [facility] [phone]   [facility] [phone]   [facility] [phone]

## 2025-01-23 NOTE — ASSESSMENT & PLAN NOTE
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  -- TTE 5/23/2024: LVEF 70%, grade 1 diastolic dysfunction, mild LVOT obstruction, moderate mitral annual calcification, mild tricuspid regurgitation.  -- Pharmacotherapy:  - Prehospital diuretic: Torsemide 30 mg twice daily with additional 20 mg as needed  - Prehospital beta-blocker: Toprol 12.5 mg daily  TODAY (01/23/25):   Patient weight remained stable today  Continue with torsemide 30 mg twice daily

## 2025-01-23 NOTE — TELEPHONE ENCOUNTER
Spoke with daughter and made appt for 1/31 aware of chf and senior life and facility will be keeping eye on her per daughter.

## 2025-01-23 NOTE — ASSESSMENT & PLAN NOTE
Wt Readings from Last 3 Encounters:   01/23/25 77.8 kg (171 lb 8.3 oz)   12/20/24 81.2 kg (179 lb)   12/02/24 81.3 kg (179 lb 3.7 oz)     Presented with increasing weight, bilateral lower extremity edema and decreased urine output via EMS  PTA on torsemide 30 mg daily, notes she follows a sodium fluid restricted diet at [facility] her EDWIN  TTE 11/2024 reviewed LVEF 51%, unable to assess diastolic function  Chest x-ray clear although BNP elevated at 222 and patient with hypervolemic hyponatremia  With increasing weight, dyspnea, lower extremity edema. CXR reviewed no acute cardiopulmonary disease  Continue monitoring intake and output, daily weights  Discharged on torsemide 30 mg BID

## 2025-01-24 ENCOUNTER — TRANSITIONAL CARE MANAGEMENT (OUTPATIENT)
Dept: FAMILY MEDICINE CLINIC | Facility: CLINIC | Age: 80
End: 2025-01-24

## 2025-01-31 ENCOUNTER — TELEPHONE (OUTPATIENT)
Dept: CARDIOLOGY CLINIC | Facility: CLINIC | Age: 80
End: 2025-01-31

## 2025-01-31 PROBLEM — I83.018 VARICOSE VEINS OF RIGHT LOWER EXTREMITY WITH ULCER OTHER PART OF LOWER LEG (CODE) (HCC): Status: ACTIVE | Noted: 2025-01-31

## 2025-01-31 PROBLEM — I47.19 MULTIFOCAL ATRIAL TACHYCARDIA (HCC): Status: ACTIVE | Noted: 2025-01-31

## 2025-01-31 NOTE — TELEPHONE ENCOUNTER
Left message for Mattie requesting a BMP before her appointment with Kylie on Monday. If she cannot get Monday morning before appointment, she is to get it next week sometime to check kidney function.  It will be a non fasting test.

## 2025-02-03 NOTE — RESULT NOTES
Message    Duodenal biopsies are benign  Schedule for EGD in one to 2 months for dilation of the stricture      LMOM for pt to call the office for results and to scheduled repeat EGD w/ dilation in 2mo  CF         Verified Results  (1) TISSUE EXAM [id number] 08:26AM Desiree Counts     Test Name Result Flag Reference   LAB AP CASE REPORT (Report)     Surgical Pathology Report             Case: [id number]                   Authorizing Provider: Abdoul Enamorado MD     Collected:      07/18/2016 0826        Ordering Location:   [facility]    Received:      07/18/2016 [address] [facility] End[facility]                               Pathologist:      Carli Elliott MD                             Specimen:  Duodenum, stricture   LAB AP FINAL DIAGNOSIS (Report)     A  Duodenum, biopsy:  - Benign duodenal mucosa with surface erosion, reactive changes and mixed   inflammatory infiltrate, consistent with ulcer   - Intraepithelial lymphocytes are not increased, negative for features of   malabsorptive enteropathy   - Negative for dysplasia or malignancy  Electronically signed by Carli Elliott MD on 7/21/2016 at 1:54 PM   LAB AP SURGICAL ADDITIONAL INFORMATION (Report)     These tests were developed and their performance characteristics   determined by [facility] or [facility]  They may not be cleared or approved by the [place]  Food and   Drug Administration  The FDA has determined that such clearance or   approval is not necessary  These tests are used for clinical purposes  They should not be regarded as investigational or for research  This   laboratory has been approved by CLIA 88, designated as a high-complexity   laboratory and is qualified to perform these tests  Interpretation performed at [facility]   LAB AP GROSS DESCRIPTION (Report)     A   The specimen is received in formalin, labeled with the patient's name and hospital number, and is designated duodenum stricture  The specimen   consists of multiple tan soft tissue fragments measuring in aggregate 0 5   x 0 5 x 0 1 cm  Entirely submitted  One cassette  Note: The estimated total formalin fixation time based upon information   provided by the submitting clinician and the standard processing schedule   is 29 5 hours      MAC Noted

## 2025-02-05 ENCOUNTER — TELEPHONE (OUTPATIENT)
Dept: UROLOGY | Facility: CLINIC | Age: 80
End: 2025-02-05

## 2025-02-05 DIAGNOSIS — R39.9 UTI SYMPTOMS: Primary | ICD-10-CM

## 2025-02-05 NOTE — TELEPHONE ENCOUNTER
Since we are not going botox please see if patient is able to come in for the open 15 min spots on 2/10 to discuss plan moving forward regarding ahn. I would like to not have her follow up utilizing a cysto spot

## 2025-02-05 NOTE — TELEPHONE ENCOUNTER
Called and spoke to the patient as note below states she is no longer a candidate for botox injections, yet 2/12 appt for injections is still on schedule. Patient states she wanted to discuss potential further botox injections and her current catheter with [facility]. Please advise if appt is appropriate, and if current 2/12 appt can just be changed to office visit or if she should be rescheduled.    Teresa Rojas RN       10/15/24 11:43 AM  Note     Discussed in office with Dr. Ramirez. Will proceed with TOV next week. Patient will no longer be a candidate for botox so further appointments will be canceled. Will schedule a routine follow up with Teresa AYON for symptom follow up

## 2025-02-05 NOTE — TELEPHONE ENCOUNTER
Spoke with pt to reschedule to 2/10 but she informed me I need to call [facility] and they were closed. Will call tomorrow to offer 2/10 appt with Ashley.

## 2025-02-07 ENCOUNTER — TELEPHONE (OUTPATIENT)
Dept: CARDIOLOGY CLINIC | Facility: CLINIC | Age: 80
End: 2025-02-07

## 2025-02-12 ENCOUNTER — TELEPHONE (OUTPATIENT)
Dept: UROLOGY | Facility: CLINIC | Age: 80
End: 2025-02-12

## 2025-02-12 ENCOUNTER — PROCEDURE VISIT (OUTPATIENT)
Dept: UROLOGY | Facility: CLINIC | Age: 80
End: 2025-02-12
Payer: MEDICARE

## 2025-02-12 ENCOUNTER — TELEPHONE (OUTPATIENT)
Age: 80
End: 2025-02-12

## 2025-02-12 VITALS
DIASTOLIC BLOOD PRESSURE: 54 MMHG | BODY MASS INDEX: 33.5 KG/M2 | SYSTOLIC BLOOD PRESSURE: 116 MMHG | OXYGEN SATURATION: 93 % | HEART RATE: 97 BPM | HEIGHT: 60 IN

## 2025-02-12 DIAGNOSIS — N32.81 DETRUSOR INSTABILITY: Primary | ICD-10-CM

## 2025-02-12 DIAGNOSIS — R33.9 URINARY RETENTION: Primary | ICD-10-CM

## 2025-02-12 DIAGNOSIS — R32 URINARY INCONTINENCE, UNSPECIFIED TYPE: ICD-10-CM

## 2025-02-12 PROCEDURE — 51702 INSERT TEMP BLADDER CATH: CPT

## 2025-02-12 PROCEDURE — 99214 OFFICE O/P EST MOD 30 MIN: CPT | Performed by: UROLOGY

## 2025-02-12 RX ORDER — TROSPIUM CHLORIDE 20 MG/1
20 TABLET, FILM COATED ORAL 2 TIMES DAILY
Qty: 60 TABLET | Refills: 6 | Status: SHIPPED | OUTPATIENT
Start: 2025-02-12

## 2025-02-12 NOTE — PROGRESS NOTES
Referring Physician: NANY Pollock  A copy of this note was sent to the referring physician.       Diagnoses and all orders for this visit:    Urinary retention    Urinary incontinence, unspecified type            Assessment and plan:       1.  Complex medically refractory mixed lower urinary tract symptoms  -Standing history of incontinence which is multifactorial  -Treated with bladder Botox in June and October 2024    2.  Recent development of urinary retention  -Has failed 2 void trials, now catheter dependent    3.  Medical comorbidities:  -Prior CVA  - Coronary artery disease  - CHF  - COPD  - Chronic hypoxia on oxygen  - Anemia  - Stage III chronic kidney disease  -Polypharmacy  - Chronic opioid use for chronic pain  - Chronic constipation    Pleasure to reevaluate the patient.  We reviewed 2 major options at this point.  The first was attempting a void trial, versus continuing with the catheter chronically.  I shared my concerns that she might not be successful in additional void trial and there be a risk of recurrent retention/hospitalization.  She also understands that if the catheter is removed successfully he would likely return to her baseline incontinence which was very morbid for the patient.    After some discussion Linda has elected to maintain the catheter chronically and I think this is a reasonable option.  Will reinstitute medical management of overactive bladder which I think will help with her spasms and pelvic pain.  All overactive bladder medications particularly those that would be safe in her age and frailty appear to be not covered by her formulary plan.  I will attempt to have our prior authorization team find something that may be a reasonable option -ideally Myrbetriq, Gemtesa, or trospium.  She is no longer a candidate for additional bladder Botox given her urinary retention.    Her catheter was exchanged today by our nursing staff, medication prescribed.  She will return  every 4 to 6 weeks for catheter exchange or sooner if needed.        Benjamin Ramirez MD      Chief Complaint     Follow-up chronic incontinence, new issue of urinary retention x 2      History of Present Illness     Mattie Varela is a 79 y.o. returns in follow-up for complex voiding dysfunction    Detailed Urologic History     - please refer to HPI    Review of Systems     Review of Systems   Constitutional: Negative for activity change and fatigue.   HENT: Negative for congestion.    Eyes: Negative for visual disturbance.   Respiratory: Negative for shortness of breath and wheezing.    Cardiovascular: Negative for chest pain and leg swelling.   Gastrointestinal: Negative for abdominal pain.   Endocrine: Negative for polyuria.   Genitourinary: Negative for dysuria, flank pain, hematuria and urgency.   Musculoskeletal: Negative for back pain.   Allergic/Immunologic: Negative for immunocompromised state.   Neurological: Negative for dizziness and numbness.   Psychiatric/Behavioral: Negative for dysphoric mood.   All other systems reviewed and are negative.          Allergies     Allergies   Allergen Reactions    Penicillins Anaphylaxis, Hives and Other (See Comments)     Other reaction(s): Unknown Reaction    Sulfa Antibiotics Anaphylaxis and Other (See Comments)     Other reaction(s): Unknown Reaction    Aspartame - Food Allergy Other (See Comments) and Hypertension     Slurred speech, weakness, stroke sx    Iodinated Contrast Media Hives     Pt has taken prep prior for contrast and has not had any break through reaction    Keflex [Cephalexin] Hives       Physical Exam       Physical Exam  Constitutional:       General: He is not in acute distress.     Appearance: He is well-developed.   HENT:      Head: Normocephalic and atraumatic.   Cardiovascular:      Comments: Negative lower extremity edema  Pulmonary:      Effort: Pulmonary effort is normal.      Breath sounds: Normal breath sounds.   Abdominal:       Palpations: Abdomen is soft.   Musculoskeletal:         General: Normal range of motion.      Cervical back: Normal range of motion.   Skin:     General: Skin is warm.   Neurological:      Mental Status: He is alert and oriented to person, place, and time.   Psychiatric:         Behavior: Behavior normal.           Vital Signs  Vitals:    02/12/25 1323   BP: 116/54   BP Location: Left arm   Patient Position: Sitting   Cuff Size: Large   Pulse: 97   SpO2: 93%   Height: 5' (1.524 m)         Current Medications       Current Outpatient Medications:     acetaminophen (TYLENOL) 325 mg tablet, Take 3 tablets (975 mg total) by mouth every 8 (eight) hours, Disp: , Rfl:     albuterol (PROVENTIL HFA,VENTOLIN HFA) 90 mcg/act inhaler, Inhale 2 puffs every 6 (six) hours as needed for wheezing or shortness of breath, Disp: 6.7 g, Rfl: 5    aspirin 81 mg chewable tablet, Chew 81 mg daily, Disp: , Rfl:     atorvastatin (LIPITOR) 40 mg tablet, TAKE ONE TABLET BY MOUTH ONCE DAILY, Disp: 90 tablet, Rfl: 0    Blood Glucose Monitoring Suppl (OneTouch Verio Reflect) w/Device KIT, Check blood sugars twice daily. Please substitute with appropriate alternative as covered by patient's insurance. Dx: E11.65, Disp: 1 kit, Rfl: 0    docusate sodium (COLACE) 100 mg capsule, Take 1 capsule (100 mg total) by mouth 2 (two) times a day, Disp: , Rfl:     ergocalciferol (VITAMIN D2) 50,000 units, Take 50,000 Units by mouth Take I cap orally once monthly, Disp: , Rfl:     famotidine (PEPCID) 20 mg tablet, Take 20 mg by mouth 2 (two) times a day 1 tab orally twice daily as needed for heartburn/acid reflux, Disp: , Rfl:     fentaNYL (DURAGESIC) 75 mcg/hr, Place 1 patch on the skin every third day Apply 1 patch every 3 days, Disp: , Rfl:     ferrous sulfate 325 (65 Fe) mg tablet, Take 1 tablet (325 mg total) by mouth daily with breakfast, Disp: 90 tablet, Rfl: 0    gabapentin (NEURONTIN) 100 mg capsule, Take 1 capsule (100 mg total) by mouth daily at  bedtime (Patient not taking: Reported on 2/7/2025), Disp: , Rfl:     glucose blood (OneTouch Verio) test strip, Check blood sugars twice daily. Please substitute with appropriate alternative as covered by patient's insurance. Dx: E11.65, Disp: 200 each, Rfl: 3    HYDROmorphone (DILAUDID) 2 mg tablet, Take 1 tablet (2 mg total) by mouth every 6 (six) hours as needed for moderate pain or severe pain Max Daily Amount: 8 mg (Patient not taking: Reported on 1/19/2025), Disp: 12 tablet, Rfl: 0    latanoprost (XALATAN) 0.005 % ophthalmic solution, Administer 1 drop to both eyes daily at bedtime, Disp: , Rfl:     levothyroxine 75 mcg tablet, Take 0.5 tablets (37.5 mcg total) by mouth daily in the early morning, Disp: , Rfl:     lidocaine (LIDODERM) 5 %, Apply 1 patch topically over 12 hours daily Remove & Discard patch within 12 hours or as directed by MD, Disp: , Rfl:     Lidocaine 4 % PTCH, Apply 4 % topically Apply 1 every morning to back and remove at bedtime, Disp: , Rfl:     loperamide (IMODIUM A-D) 2 MG tablet, Take 2 mg by mouth 4 (four) times a day as needed for diarrhea, Disp: , Rfl:     MAGNESIUM OXIDE 400 PO, Take 400 mg by mouth 2 (two) times a day, Disp: , Rfl:     meclizine (ANTIVERT) 25 mg tablet, Take 1 tablet (25 mg total) by mouth 4 (four) times a day as needed for dizziness, Disp: 60 tablet, Rfl: 0    melatonin 3 mg, Take 3 mg by mouth daily at bedtime 1 tab orally at bedtime, Disp: , Rfl:     metFORMIN (GLUCOPHAGE) 500 mg tablet, Take 500 mg by mouth 2 (two) times a day with meals 1 tablet PO twice daily with meals (Patient not taking: Reported on 1/31/2025), Disp: , Rfl:     methocarbamol (ROBAXIN) 750 mg tablet, Take 750 mg by mouth every 8 (eight) hours 1 tab orally every 8 hours as needed for muscle spasms, Disp: , Rfl:     metoprolol succinate (TOPROL-XL) 25 mg 24 hr tablet, Take 0.5 tablets (12.5 mg total) by mouth daily, Disp: , Rfl:     Milnacipran HCl (Savella) 100 MG TABS, Take 1 tablet (100  mg total) by mouth daily, Disp: 30 tablet, Rfl: 3    morphine (MSIR) 15 mg tablet, Take 15 mg by mouth every 6 (six) hours as needed for severe pain 1 tablet ever 6 hours for pain (Patient not taking: Reported on 2/7/2025), Disp: , Rfl:     ondansetron (ZOFRAN) 4 mg tablet, Take 1 tablet (4 mg total) by mouth every 8 (eight) hours as needed for nausea or vomiting, Disp: 20 tablet, Rfl: 0    OneTouch Delica Lancets 33G MISC, Check blood sugars twice daily. Please substitute with appropriate alternative as covered by patient's insurance. Dx: E11.65, Disp: 200 each, Rfl: 3    oxymetazoline (AFRIN) 0.05 % nasal spray, 2 sprays by Each Nare route every 12 (twelve) hours as needed for congestion for up to 3 days, Disp: 6 mL, Rfl: 0    pantoprazole (PROTONIX) 40 mg tablet, Take 1 tablet (40 mg total) by mouth daily, Disp: , Rfl:     polyethylene glycol (MIRALAX) 17 g packet, Take 17 g by mouth daily, Disp: , Rfl:     senna (SENOKOT) 8.6 mg, Take 2 tablets (17.2 mg total) by mouth daily at bedtime, Disp: , Rfl:     simethicone (MYLICON) 80 mg chewable tablet, Chew 80 mg every 6 (six) hours as needed for flatulence Chew and swallow 1 tab orally 3 times daily as needed for gas, Disp: , Rfl:     sucralfate (CARAFATE) 1 g tablet, Take 1 tablet (1 g total) by mouth 2 (two) times a day before meals for 7 days, Disp: 14 tablet, Rfl: 0    torsemide (DEMADEX) 10 mg tablet, Take 3 tablets (30 mg total) by mouth 2 (two) times a day, Disp: , Rfl:     zolpidem (AMBIEN CR) 6.25 MG CR tablet, Take 6.25 mg by mouth daily at bedtime as needed for sleep, Disp: , Rfl:       Active Problems     Patient Active Problem List   Diagnosis    Type 2 diabetes mellitus with other skin complications (HCC)    Chronic pain disorder    Glaucoma    Hypertension    Iron deficiency anemia secondary to inadequate dietary iron intake    Anxiety    Hypercholesterolemia    Hypothyroidism    S/P insertion of spinal cord stimulator    Mild intermittent asthma  without complication    Prepyloric ulcer    Cerebrovascular accident (CVA), unspecified mechanism (HCC)    Confusion with body shakes and blank stare    Anemia in stage 3b chronic kidney disease  (HCC)    Chronic obstructive pulmonary disease, unspecified COPD type (HCC)    Acute respiratory failure with hypoxia (HCC)    Non obstructive CAD    Urinary retention    Left ventricular outflow obstruction    Obesity, Class I, BMI 30-34.9    Moderate protein-calorie malnutrition (HCC)    s/p Medtronic loop recorder 3/2/2024    Acute on chronic diastolic congestive heart failure (HCC)    Constipation    Patella fracture    Insomnia    Stage 3b chronic kidney disease (HCC)    Hyponatremia    Gastro-esophageal reflux disease without esophagitis    Congestion of nasal sinus    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 other part of lower leg (CODE) (HCC)    Multifocal atrial tachycardia (HCC)    Urinary incontinence         Past Medical History     Past Medical History:   Diagnosis Date    Acid reflux     Acute kidney injury (HCC) 06/18/2022    Anemia     hx of iron-deficient    Anxiety     Arthritis     Asthma     last needed inhaler last year 2020    Basal cell carcinoma     upper lip    Chronic narcotic dependence (HCC)     Chronic pain     Colitis 11/19/2024    Colon polyp     Cystocele     Diabetes mellitus (HCC)     stable    Disease of thyroid gland     hypothyroidism    Diverticulosis     Dizziness     at times    Dysfunctional uterine bleeding     last assessed - [id number]    Dysphagia     Fibromyalgia     Gastric ulcer     Gastroparesis     History of colonic polyps     last assessed - [id number]    History of gastroesophageal reflux (GERD)     Hypercholesterolemia     Hyperlipidemia     Hypertension     Hyponatremia 01/13/2024    IBS (irritable bowel syndrome)     Pneumobilia 06/18/2022    Post laminectomy syndrome     S/P insertion of spinal cord stimulator 07/18/2018    s/p Medtronic loop recorder  3/2/2024 03/02/2024    Seasonal allergies     Shortness of breath     exertional    Spinal stenosis     Status post lumbar spinal fusion 03/16/2018    Stroke (HCC)     pt states slight stroke March 2022         Surgical History     Past Surgical History:   Procedure Laterality Date    APPENDECTOMY      BACK SURGERY      BREAST CYST EXCISION Left     CARDIAC CATHETERIZATION  11/14/2023    Procedure: Cardiac catheterization;  Surgeon: Randolph Holt MD;  [facility];  Service: Cardiology    CARDIAC CATHETERIZATION N/A 11/14/2023    Procedure: Cardiac Coronary Angiogram;  Surgeon: Randolph Holt MD;  [facility];  Service: Cardiology    CARDIAC ELECTROPHYSIOLOGY PROCEDURE N/A 3/2/2024    Procedure: Cardiac loop recorder implant;  Surgeon: Edu Fontaine MD;  [facility]: [facility];  Service: Cardiology    CHOLECYSTECTOMY      COLONOSCOPY      ESOPHAGOGASTRODUODENOSCOPY N/A 09/28/2016    Procedure: ESOPHAGOGASTRODUODENOSCOPY (EGD);  Surgeon: Mylene Moeller MD;  [facility]: [facility];  Service:     HERNIA REPAIR      HYSTERECTOMY      TTAH-BSO age 28    LAMINECTOMY      LUMBAR LAMINECTOMY      OOPHORECTOMY      age 28    RI ARTHRODESIS POSTERIOR/PSTLAT TQ 1NTRSPC THORACIC N/A 06/04/2018    Procedure: Reopening of lumbar incision for T12-L5 posterior instrumented fixation and fusion and T12-L4 posterior decompression;  Surgeon: Wood Rogel MD;  Location: [facility];  Service: Neurosurgery    RI COLONOSCOPY FLX DX W/COLLJ SPEC WHEN PFRMD N/A 03/02/2016    Procedure: EGD AND COLONOSCOPY;  Surgeon: Mylene Moeller MD;  [facility]: [facility];  Service: Gastroenterology    RI DILATION ESOPH UNGUIDED SOUND/BOUGIE 1/MULT PASS N/A 09/28/2016    Procedure: DILATATION ESOPHAGEAL;  Surgeon: Mylene Moeller MD;  [facility];  Service: Gastroenterology    RI ESOPHAGOGASTRODUODENOSCOPY TRANSORAL DIAGNOSTIC N/A 07/18/2016    Procedure: ESOPHAGOGASTRODUODENOSCOPY (EGD);  Surgeon:  "Mylene Moeller MD;  [facility];  Service: Gastroenterology    MA INSJ/RPLCMT SPINAL NPG/RCVR POCKET CRTJ&CONNJ Left 2018    Procedure: removal of left buttock implantable pulse generator and placement of new  implantable pulse generator;  Surgeon: Wood Rogel MD;  Location: [facility];  Service: Neurosurgery         Family History     Family History   Problem Relation Age of Onset    Lung cancer Mother 46    Pulmonary embolism Father     No Known Problems Sister     No Known Problems Daughter     No Known Problems Daughter     Stroke Maternal Grandmother     Heart attack Maternal Grandfather     No Known Problems Paternal Grandmother     No Known Problems Paternal Grandfather     No Known Problems Maternal Aunt     Diabetes Family         Diabetes mellitus    Hypertension Family     Stroke Family         Stroke complications         Social History     Social History     Social History     Tobacco Use   Smoking Status Former    Current packs/day: 0.00    Types: Cigarettes    Quit date: 1970    Years since quittin.1   Smokeless Tobacco Never   Tobacco Comments    Denied history of current ever day smoker, Former smoker and Never smoker all documented in [employer]         Pertinent Lab Values     Lab Results   Component Value Date    CREATININE 1.54 (H) 2025       No results found for: \"PSA\"    @RESULTRCNT(1H])@      Pertinent Imaging       US bedside procedure  Result Date: 2025  Narrative: [id number]    XR chest 1 view portable  Result Date: 2025  Narrative: XR CHEST PORTABLE INDICATION: sob. COMPARISON: 2024 FINDINGS: Clear lungs. Elevated right hemidiaphragm as before. No pneumothorax or pleural effusion. Normal cardiomediastinal silhouette. Left chest loop recorder noted. Spinal stimulator device noted. Bones are unremarkable for age. Normal upper abdomen.     Impression: No acute cardiopulmonary disease. Workstation performed: " "[id number]         Portions of the record may have been created with voice recognition software.  Occasional wrong word or \"sound a like\" substitutions may have occurred due to the inherent limitations of voice recognition software.  In addition some of the content generated from this outpatient encounter includes information designed for patient education and/or communication back to the referring provider.  Read the chart carefully and recognize, using context, where substitutions have occurred.    "

## 2025-02-12 NOTE — TELEPHONE ENCOUNTER
Hello,    I was given your contact information from our clinical coordinator.      This is a frail elderly patient in need of medication for overactive bladder.  When I looked in epic, no overactive bladder medication appears to be covered for her    Could you please assist getting a list of options for her?  The only safe options for this particular patient would be:    - Mirabegron  - Gemtesa  - Trospium    I appreciate anything that you can do.  Happy to send in the prescription when you identify the best option.  Thank you

## 2025-02-12 NOTE — TELEPHONE ENCOUNTER
PA for TROSPIUM 20 MG SUBMITTED to [employer]    via    [x]CMM-KEY: [id number]       [x]PA sent as URGENT    All office notes, labs and other pertaining documents and studies sent. Clinical questions answered. Awaiting determination from insurance company.     Turnaround time for your insurance to make a decision on your Prior Authorization can take 7-21 business days.

## 2025-02-13 ENCOUNTER — OFFICE VISIT (OUTPATIENT)
Dept: CARDIOLOGY CLINIC | Facility: CLINIC | Age: 80
End: 2025-02-13
Payer: MEDICARE

## 2025-02-13 ENCOUNTER — TELEPHONE (OUTPATIENT)
Dept: CARDIOLOGY CLINIC | Facility: CLINIC | Age: 80
End: 2025-02-13

## 2025-02-13 VITALS
WEIGHT: 168 LBS | SYSTOLIC BLOOD PRESSURE: 115 MMHG | HEART RATE: 93 BPM | HEIGHT: 60 IN | BODY MASS INDEX: 32.98 KG/M2 | DIASTOLIC BLOOD PRESSURE: 58 MMHG

## 2025-02-13 DIAGNOSIS — I25.10 CORONARY ARTERY DISEASE INVOLVING NATIVE CORONARY ARTERY OF NATIVE HEART WITHOUT ANGINA PECTORIS: ICD-10-CM

## 2025-02-13 DIAGNOSIS — G62.9 NEUROPATHY: ICD-10-CM

## 2025-02-13 DIAGNOSIS — I50.33 ACUTE ON CHRONIC DIASTOLIC CONGESTIVE HEART FAILURE (HCC): ICD-10-CM

## 2025-02-13 DIAGNOSIS — E11.628 TYPE 2 DIABETES MELLITUS WITH OTHER SKIN COMPLICATIONS (HCC): ICD-10-CM

## 2025-02-13 DIAGNOSIS — Z78.9: Primary | ICD-10-CM

## 2025-02-13 DIAGNOSIS — K59.00 CONSTIPATION, UNSPECIFIED CONSTIPATION TYPE: Chronic | ICD-10-CM

## 2025-02-13 DIAGNOSIS — I10 PRIMARY HYPERTENSION: Primary | ICD-10-CM

## 2025-02-13 PROCEDURE — 99214 OFFICE O/P EST MOD 30 MIN: CPT | Performed by: PHYSICIAN ASSISTANT

## 2025-02-13 RX ORDER — MORPHINE SULFATE 15 MG/1
15 TABLET ORAL EVERY 6 HOURS
Qty: 30 TABLET | Refills: 0 | Status: SHIPPED | OUTPATIENT
Start: 2025-02-13

## 2025-02-13 RX ORDER — GABAPENTIN 300 MG/1
300 CAPSULE ORAL
Status: SHIPPED
Start: 2025-02-13

## 2025-02-13 RX ORDER — TORSEMIDE 20 MG/1
20 TABLET ORAL AS NEEDED
COMMUNITY

## 2025-02-13 RX ORDER — SENNOSIDES 8.6 MG
17.2 TABLET ORAL EVERY MORNING
Status: SHIPPED
Start: 2025-02-13

## 2025-02-13 NOTE — PROGRESS NOTES
"   Cardiology Follow Up    [facility]  [address] [place] [id number]  PHONE: [phone]  FAX: [phone]    Mattie Varela  1945  [id number]    Assessment/Plan:  Acute on chronic HFpEF, LVEF 51%, AHA stage D  Hyponatremia  CKD stage III  Urinary retention with Bateman catheter  Spinal stimulator  Anemia of chronic disease  Type 2 diabetes  COPD  History of patella fracture after a fall in November 2024  Chronic pain disorder with spinal stimulator in place  Chronic opiate use  Glaucoma  Hypothyroidism    She examines euvolemic today.  Prior to her hospitalization she was taking 30 mg torsemide twice daily with additional 20 mg as needed.  She tells me \"they\" increased the water pill lately. I don't know who \"they\" is. I messaged PCP as well as clinical staff looking into it with [facility] and [facility] staff. I would say whatever regimen she is actually on seems to be working based on my physical exam and vitals today.    Jardiance probably not the best for her given chronic  problems including present Bateman catheter. There would be a risk for UTI.    I asked for BMP prior to this visit but it was not accomplished. I don't know who is in charge of getting Mattie's blood work. Cardiology clinical staff also looking into it by calling [facility] [place] and [facility].    She needs wound care for the bl le. I ordered.    RTO in 2 months w/ me or Dr. Paco Boyer     Interval history: Mattie Varela is a 79 y.o. female with past medical history as below who presents to the office for CHF follow-up.  She was hospitalized at [facility] from 1/19-1/24/25 for shortness of breath, weight gain and leg swelling from CHF.  Was treated with IV diuretic.  Weight decreased from 187-171 pounds.  Prior to discharge she was transitioned to torsemide 30 mg twice daily with stable weight as well as stable kidney function.    Since her discharge back to [facility], patient " reports she suffered from the norovirus which set her back but she has recovered and is participating in physical therapy almost every day.  Her legs are getting stronger, ambulating quite a distance with rolling walker and a physical therapy assistant.  Patient denies shortness of breath, dyspnea on exertion, chest pain, palpitations or orthopnea.  Her roommate at [facility] is sick and Mattie thinks she has picked up the scratchy throat and cough.  Mattie is just hoping that she does not get more sick from what ever virus her roommate must have.  Patient reports her peripheral edema is getting better every day.    Past Medical History:   Diagnosis Date    Acid reflux     Acute kidney injury (HCC) 06/18/2022    Anemia     hx of iron-deficient    Anxiety     Arthritis     Asthma     last needed inhaler last year 2020    Basal cell carcinoma     upper lip    Chronic narcotic dependence (HCC)     Chronic pain     Colitis 11/19/2024    Colon polyp     Cystocele     Diabetes mellitus (HCC)     stable    Disease of thyroid gland     hypothyroidism    Diverticulosis     Dizziness     at times    Dysfunctional uterine bleeding     last assessed - [id number]    Dysphagia     Fibromyalgia     Gastric ulcer     Gastroparesis     History of colonic polyps     last assessed - [id number]    History of gastroesophageal reflux (GERD)     Hypercholesterolemia     Hyperlipidemia     Hypertension     Hyponatremia 01/13/2024    IBS (irritable bowel syndrome)     Pneumobilia 06/18/2022    Post laminectomy syndrome     S/P insertion of spinal cord stimulator 07/18/2018    s/p [employer] loop recorder 3/2/2024 03/02/2024    Seasonal allergies     Shortness of breath     exertional    Spinal stenosis     Status post lumbar spinal fusion 03/16/2018    Stroke ([facility])     pt states slight stroke March 2022      Past Surgical History:   Procedure Laterality Date    APPENDECTOMY      BACK SURGERY      BREAST CYST EXCISION Left     CARDIAC  CATHETERIZATION  11/14/2023    Procedure: Cardiac catheterization;  Surgeon: Randolph Holt MD;  [facility];  Service: Cardiology    CARDIAC CATHETERIZATION N/A 11/14/2023    Procedure: Cardiac Coronary Angiogram;  Surgeon: Randolph Holt MD;  [facility]: [facility]ATH [facility];  Service: Cardiology    CARDIAC ELECTROPHYSIOLOGY PROCEDURE N/A 3/2/2024    Procedure: Cardiac loop recorder implant;  Surgeon: Edu Fontaine MD;  Location: [facility] CARDIAC [facility]ATH [facility];  Service: Cardiology    CHOLECYSTECTOMY      COLONOSCOPY      ESOPHAGOGASTRODUODENOSCOPY N/A 09/28/2016    Procedure: ESOPHAGOGASTRODUODENOSCOPY (EGD);  Surgeon: Mylene Moeller MD;  Location: [facility] GI LAB;  Service:     HERNIA REPAIR      HYSTERECTOMY      TTAH-BSO age 28    LAMINECTOMY      LUMBAR LAMINECTOMY      OOPHORECTOMY      age 28    FL ARTHRODESIS POSTERIOR/PSTLAT TQ 1NTRSPC THORACIC N/A 06/04/2018    Procedure: Reopening of lumbar incision for T12-L5 posterior instrumented fixation and fusion and T12-L4 posterior decompression;  Surgeon: Wood Rogel MD;  Location: [facility] MAIN OR;  Service: Neurosurgery    FL COLONOSCOPY FLX DX W/COLLJ SPEC WHEN PFRMD N/A 03/02/2016    Procedure: EGD AND COLONOSCOPY;  Surgeon: Mylene Moeller MD;  Location: [facility] GI LAB;  Service: Gastroenterology    FL DILATION ESOPH UNGUIDED SOUND/BOUGIE 1/MULT PASS N/A 09/28/2016    Procedure: DILATATION ESOPHAGEAL;  Surgeon: Mylene Moeller MD;  Location: [facility] GI LAB;  Service: Gastroenterology    FL ESOPHAGOGASTRODUODENOSCOPY TRANSORAL DIAGNOSTIC N/A 07/18/2016    Procedure: ESOPHAGOGASTRODUODENOSCOPY (EGD);  Surgeon: Mylene Moeller MD;  Location: [facility] GI [facility];  Service: Gastroenterology    FL INSJ/RPLCMT SPINAL NPG/RCVR POCKET CRTJ&CONNJ Left 06/04/2018    Procedure: removal of left buttock implantable pulse generator and placement of new  implantable pulse generator;  Surgeon: Wood Rogel MD;  Location: [facility] MA[facility];  Service: Neurosurgery      Family History   Problem Relation  Age of Onset    Lung cancer Mother 46    Pulmonary embolism Father     No Known Problems Sister     No Known Problems Daughter     No Known Problems Daughter     Stroke Maternal Grandmother     Heart attack Maternal Grandfather     No Known Problems Paternal Grandmother     No Known Problems Paternal Grandfather     No Known Problems Maternal Aunt     Diabetes Family         Diabetes mellitus    Hypertension Family     Stroke Family         Stroke complications      Current Outpatient Medications:     acetaminophen (TYLENOL) 325 mg tablet, Take 3 tablets (975 mg total) by mouth every 8 (eight) hours, Disp: , Rfl:     albuterol (PROVENTIL HFA,VENTOLIN HFA) 90 mcg/act inhaler, Inhale 2 puffs every 6 (six) hours as needed for wheezing or shortness of breath, Disp: 6.7 g, Rfl: 5    aspirin 81 mg chewable tablet, Chew 81 mg daily, Disp: , Rfl:     atorvastatin (LIPITOR) 40 mg tablet, TAKE ONE TABLET BY MOUTH ONCE DAILY, Disp: 90 tablet, Rfl: 0    docusate sodium (COLACE) 100 mg capsule, Take 1 capsule (100 mg total) by mouth 2 (two) times a day, Disp: , Rfl:     ergocalciferol (VITAMIN D2) 50,000 units, Take 50,000 Units by mouth Take I cap orally once monthly, Disp: , Rfl:     famotidine (PEPCID) 20 mg tablet, Take 20 mg by mouth 2 (two) times a day 1 tab orally twice daily as needed for heartburn/acid reflux, Disp: , Rfl:     fentaNYL (DURAGESIC) 75 mcg/hr, Place 1 patch on the skin every third day Apply 1 patch every 3 days, Disp: , Rfl:     ferrous sulfate 325 (65 Fe) mg tablet, Take 1 tablet (325 mg total) by mouth daily with breakfast, Disp: 90 tablet, Rfl: 0    gabapentin (NEURONTIN) 100 mg capsule, Take 1 capsule (100 mg total) by mouth daily at bedtime, Disp: , Rfl:     latanoprost (XALATAN) 0.005 % ophthalmic solution, Administer 1 drop to both eyes daily at bedtime, Disp: , Rfl:     levothyroxine 75 mcg tablet, Take 0.5 tablets (37.5 mcg total) by mouth daily in the early morning, Disp: , Rfl:     lidocaine  (LIDODERM) 5 %, Apply 1 patch topically over 12 hours daily Remove & Discard patch within 12 hours or as directed by MD, Disp: , Rfl:     Lidocaine 4 % PTCH, Apply 4 % topically Apply 1 every morning to back and remove at bedtime, Disp: , Rfl:     loperamide (IMODIUM A-D) 2 MG tablet, Take 2 mg by mouth 4 (four) times a day as needed for diarrhea, Disp: , Rfl:     MAGNESIUM OXIDE 400 PO, Take 400 mg by mouth 2 (two) times a day, Disp: , Rfl:     melatonin 3 mg, Take 3 mg by mouth daily at bedtime 1 tab orally at bedtime, Disp: , Rfl:     methocarbamol (ROBAXIN) 750 mg tablet, Take 750 mg by mouth every 8 (eight) hours 1 tab orally every 8 hours as needed for muscle spasms, Disp: , Rfl:     metoprolol succinate (TOPROL-XL) 25 mg 24 hr tablet, Take 0.5 tablets (12.5 mg total) by mouth daily, Disp: , Rfl:     morphine (MSIR) 15 mg tablet, Take 15 mg by mouth every 6 (six) hours as needed for severe pain 1 tablet ever 6 hours for pain, Disp: , Rfl:     ondansetron (ZOFRAN) 4 mg tablet, Take 1 tablet (4 mg total) by mouth every 8 (eight) hours as needed for nausea or vomiting, Disp: 20 tablet, Rfl: 0    oxymetazoline (AFRIN) 0.05 % nasal spray, 2 sprays by Each Nare route every 12 (twelve) hours as needed for congestion for up to 3 days, Disp: 6 mL, Rfl: 0    polyethylene glycol (MIRALAX) 17 g packet, Take 17 g by mouth daily, Disp: , Rfl:     senna (SENOKOT) 8.6 mg, Take 2 tablets (17.2 mg total) by mouth daily at bedtime, Disp: , Rfl:     simethicone (MYLICON) 80 mg chewable tablet, Chew 80 mg every 6 (six) hours as needed for flatulence Chew and swallow 1 tab orally 3 times daily as needed for gas, Disp: , Rfl:     sucralfate (CARAFATE) 1 g tablet, Take 1 tablet (1 g total) by mouth 2 (two) times a day before meals for 7 days, Disp: 14 tablet, Rfl: 0    torsemide (DEMADEX) 10 mg tablet, Take 3 tablets (30 mg total) by mouth 2 (two) times a day, Disp: , Rfl:     trospium chloride (SANCTURA) 20 mg tablet, Take 1 tablet  (20 mg total) by mouth 2 (two) times a day, Disp: 60 tablet, Rfl: 6    zolpidem (AMBIEN CR) 6.25 MG CR tablet, Take 6.25 mg by mouth daily at bedtime as needed for sleep, Disp: , Rfl:     Blood Glucose Monitoring Suppl (OneTouch Verio Reflect) w/Device KIT, Check blood sugars twice daily. Please substitute with appropriate alternative as covered by patient's insurance. Dx: E11.65, Disp: 1 kit, Rfl: 0    glucose blood (OneTouch Verio) test strip, Check blood sugars twice daily. Please substitute with appropriate alternative as covered by patient's insurance. Dx: E11.65, Disp: 200 each, Rfl: 3    Milnacipran HCl (Savella) 100 MG TABS, Take 1 tablet (100 mg total) by mouth daily (Patient not taking: Reported on 2/13/2025), Disp: 30 tablet, Rfl: 3    OneTouch Delica Lancets 33G MISC, Check blood sugars twice daily. Please substitute with appropriate alternative as covered by patient's insurance. Dx: E11.65, Disp: 200 each, Rfl: 3     Allergies   Allergen Reactions    Penicillins Anaphylaxis, Hives and Other (See Comments)     Other reaction(s): Unknown Reaction    Sulfa Antibiotics Anaphylaxis and Other (See Comments)     Other reaction(s): Unknown Reaction    Aspartame - Food Allergy Other (See Comments) and Hypertension     Slurred speech, weakness, stroke sx    Iodinated Contrast Media Hives     Pt has taken prep prior for contrast and has not had any break through reaction    Keflex [Cephalexin] Hives     Physical Exam:  Vitals:    02/13/25 1052   BP: 115/58   Pulse: 93     Vitals:    02/13/25 1052   Weight: 76.2 kg (168 lb)     Height: 5' (152.4 cm) Body mass index is 32.81 kg/m².    Physical Exam  Vitals and nursing note reviewed.   Constitutional:       Appearance: She is not ill-appearing.   HENT:      Head: Normocephalic and atraumatic.      Nose: Congestion present. No rhinorrhea.      Mouth/Throat:      Mouth: Mucous membranes are moist.   Eyes:      Conjunctiva/sclera: Conjunctivae normal.   Neck:       Comments: - JVD  Cardiovascular:      Rate and Rhythm: Normal rate and regular rhythm.      Heart sounds: S1 normal and S2 normal. Murmur heard.      Systolic murmur is present with a grade of 1/6.   Pulmonary:      Breath sounds: Normal breath sounds. No wheezing, rhonchi or rales.   Abdominal:      Palpations: Abdomen is soft.   Genitourinary:     Comments: Urinary catheter bag on left leg  Musculoskeletal:      Comments: Dependent edema right leg to the thigh 1 +   Left leg no pitting edema   Skin:     General: Skin is warm and dry.   Neurological:      General: No focal deficit present.      Mental Status: She is alert.      Comments: - tremor   Psychiatric:         Behavior: Behavior normal.     Data:  ECHO: Limited TTE 11/22/2024: EF 51%, septum is akinetic to paradoxical. Fibrocalcific changes of mitral valve with ARSALAN Sommers PA-C  [facility]

## 2025-02-13 NOTE — TELEPHONE ENCOUNTER
Called facility twice and did not get anyone to answer phone in medical area.  Had to leave a message to call our office back to discuss questions Kylie had listed in this note. Will await call from facility.  It was confirmed by  that it is a full care facility and she does not live independently. Mattie's meds are given by med tech/nurse.

## 2025-02-13 NOTE — PROGRESS NOTES
Universal Protocol:  procedure performed by consultantConsent: Verbal consent obtained.  Risks and benefits: risks, benefits and alternatives were discussed  Consent given by: patient  Patient understanding: patient states understanding of the procedure being performed  Patient consent: the patient's understanding of the procedure matches consent given  Patient identity confirmed: verbally with patient    Bladder catheterization    Date/Time: 2/12/2025 1:30 PM    Performed by: Teresa Rojas RN  Authorized by: Benjamin Ramirez MD    Patient location:  Bedside  Consent:     Consent given by:  Patient  Universal protocol:     Procedure explained and questions answered to patient or proxy's satisfaction: yes      Patient identity confirmed:  Verbally with patient  Pre-procedure details:     Procedure purpose:  Therapeutic  Anesthesia (see MAR for exact dosages):     Anesthesia method:  None  Procedure details:     Bladder irrigation: no      Catheter insertion:  Indwelling    Approach: natural orifice      Catheter type:  Latex and Ahn    Catheter size:  16 Fr    Number of attempts:  1    Successful placement: yes      Urine characteristics:  Yellow  Post-procedure details:     Patient tolerance of procedure:  Tolerated well, no immediate complications  Comments:        10 ml sterile water removed from previous balloon. Previous catheter removed without issue. Prepped with betadine, new ahn  inserted without issue. 10 ml inserted into balloon. Flushed with 10 ml sterile water for return. Attached to leg bag. Extra bags supplied

## 2025-02-13 NOTE — TELEPHONE ENCOUNTER
"----- Message from Kylie Sommers PA-C sent at 2/13/2025 12:04 PM EST -----  Regarding: her care  Hi She lives at [facility] [place]. [phone]    [facility] provides transport to appts    Apparently pt \"lives independently\" however on case management notes from Jan hospitalization it said she lives in assisted living facility.    Clinical staff, Can you please call [facility] for an active med list to be faxed to me.   Pt reports to me that \"They\" increased her water pill more recently. I have no idea who \"they\" are. Is Allison Johnson her primary care provider making these changes or is there a separate physician who serves [facility] residents?    I assume [facility] must have some weights data and bp log which would also be helpful if that information was faxed to my office for me to review.    Also, cardiology clinical staff called mariusz on 1/31 asking her to get blood work completed before my appt. When mariusz needs blood work who is in charge of getting that done? Her - she cannot walk on her own let alone drive....? [facility]? [facility] staff? Should we be sending [facility] mobile lab to her in these cases? Thanks for the info.      Kylie  "

## 2025-02-14 NOTE — TELEPHONE ENCOUNTER
Corinne Painter never called back so I called there again and nurse will fax weight log but they do not have a BP log.  She also states that we need to call [facility] to have blood work done at [phone].  I will call there, for a BMP is ordered.    Faxed order for BMP to [facility] as requested for blood draw to [phone].  See EPIC

## 2025-02-18 ENCOUNTER — APPOINTMENT (OUTPATIENT)
Dept: LAB | Facility: HOSPITAL | Age: 80
End: 2025-02-18
Payer: MEDICARE

## 2025-02-18 DIAGNOSIS — I50.33 ACUTE ON CHRONIC DIASTOLIC CONGESTIVE HEART FAILURE (HCC): ICD-10-CM

## 2025-02-18 LAB
ANION GAP SERPL CALCULATED.3IONS-SCNC: 12 MMOL/L (ref 4–13)
BUN SERPL-MCNC: 16 MG/DL (ref 5–25)
CALCIUM SERPL-MCNC: 9.3 MG/DL (ref 8.4–10.2)
CHLORIDE SERPL-SCNC: 93 MMOL/L (ref 96–108)
CO2 SERPL-SCNC: 33 MMOL/L (ref 21–32)
CREAT SERPL-MCNC: 1.23 MG/DL (ref 0.6–1.3)
GFR SERPL CREATININE-BSD FRML MDRD: 41 ML/MIN/1.73SQ M
GLUCOSE SERPL-MCNC: 122 MG/DL (ref 65–140)
POTASSIUM SERPL-SCNC: 3.7 MMOL/L (ref 3.5–5.3)
SODIUM SERPL-SCNC: 138 MMOL/L (ref 135–147)

## 2025-02-18 PROCEDURE — 80048 BASIC METABOLIC PNL TOTAL CA: CPT

## 2025-02-18 PROCEDURE — 36415 COLL VENOUS BLD VENIPUNCTURE: CPT

## 2025-02-19 ENCOUNTER — TELEPHONE (OUTPATIENT)
Age: 80
End: 2025-02-19

## 2025-02-19 NOTE — TELEPHONE ENCOUNTER
Patient called in to report hematuria and foul smelling urine that started a couple of days ago. Reports hematuria being pink in color. Denies any fevers or vomiting. Advised to increase water intake and monitor for worsening symptoms. ED precautions reviewed. Placed urine orders to rule out infection. She is also requesting to ask [facility] status of trospium chloride that was sent on 2/12. Contacted [facility] at [phone] to inform them of urine testing to be obtained and if they had any information regarding prescription. Had to leave voicemail.

## 2025-02-19 NOTE — TELEPHONE ENCOUNTER
"Pt called the wrong specialty. She is experiencing hematuria with a urinary catheter in place with \"foul smelling urine\". Chart was reviewed and patient is not on a blood thinner other than ASA 81 mg. Advised pt call her urologist and let call back if she were to need anything done with the baby aspirin. Pt verbalized understanding.   "

## 2025-02-26 ENCOUNTER — APPOINTMENT (OUTPATIENT)
Dept: LAB | Facility: HOSPITAL | Age: 80
End: 2025-02-26
Payer: MEDICARE

## 2025-02-26 LAB
BACTERIA UR QL AUTO: ABNORMAL /HPF
BILIRUB UR QL STRIP: NEGATIVE
CLARITY UR: ABNORMAL
COLOR UR: ABNORMAL
GLUCOSE UR STRIP-MCNC: NEGATIVE MG/DL
HGB UR QL STRIP.AUTO: ABNORMAL
KETONES UR STRIP-MCNC: NEGATIVE MG/DL
LEUKOCYTE ESTERASE UR QL STRIP: ABNORMAL
NITRITE UR QL STRIP: NEGATIVE
NON-SQ EPI CELLS URNS QL MICRO: ABNORMAL /HPF
PH UR STRIP.AUTO: 7 [PH]
PROT UR STRIP-MCNC: ABNORMAL MG/DL
RBC #/AREA URNS AUTO: ABNORMAL /HPF
SP GR UR STRIP.AUTO: 1.01 (ref 1–1.03)
UROBILINOGEN UR STRIP-ACNC: <2 MG/DL
WBC #/AREA URNS AUTO: ABNORMAL /HPF
WBC CLUMPS # UR AUTO: PRESENT /UL

## 2025-02-26 PROCEDURE — 87086 URINE CULTURE/COLONY COUNT: CPT

## 2025-02-26 PROCEDURE — 87186 SC STD MICRODIL/AGAR DIL: CPT

## 2025-02-26 PROCEDURE — 81001 URINALYSIS AUTO W/SCOPE: CPT

## 2025-02-26 PROCEDURE — 87077 CULTURE AEROBIC IDENTIFY: CPT

## 2025-02-27 ENCOUNTER — HOSPITAL ENCOUNTER (OUTPATIENT)
Dept: RADIOLOGY | Facility: HOSPITAL | Age: 80
Discharge: HOME | End: 2025-02-27
Payer: MEDICARE

## 2025-02-27 DIAGNOSIS — M54.50 ACUTE LOW BACK PAIN DUE TO TRAUMA: ICD-10-CM

## 2025-02-27 DIAGNOSIS — G89.11 ACUTE LOW BACK PAIN DUE TO TRAUMA: ICD-10-CM

## 2025-02-27 PROCEDURE — 72100 X-RAY EXAM L-S SPINE 2/3 VWS: CPT

## 2025-02-28 ENCOUNTER — RESULTS FOLLOW-UP (OUTPATIENT)
Dept: UROLOGY | Facility: CLINIC | Age: 80
End: 2025-02-28

## 2025-02-28 DIAGNOSIS — N39.0 URINARY TRACT INFECTION WITHOUT HEMATURIA, SITE UNSPECIFIED: Primary | ICD-10-CM

## 2025-02-28 LAB
BACTERIA UR CULT: ABNORMAL
BACTERIA UR CULT: ABNORMAL

## 2025-02-28 RX ORDER — NITROFURANTOIN 25; 75 MG/1; MG/1
100 CAPSULE ORAL DAILY
Qty: 7 CAPSULE | Refills: 0 | Status: SHIPPED | OUTPATIENT
Start: 2025-02-28 | End: 2025-03-07

## 2025-03-03 NOTE — TELEPHONE ENCOUNTER
Spoke w daughter per comm consent; shared of poss urine culture and that macrobid was called into the pharmacy. Pt appreciated the call and asked about concerns of a leaking cath. Pt said they'd call the call center for appt.

## 2025-03-03 NOTE — TELEPHONE ENCOUNTER
----- Message from NANY Acosta sent at 2/28/2025 11:49 AM EST -----  Patient's urine culture is showing very limited antibiotic sensitivities and due to her antibiotic allergies we are limited with treatment.  Macrobid does have intermediate ability at treating infection.  I will send a course of Macrobid to her pharmacy for her to take if she still continues with urinary symptoms after completion of Macrobid please have patient reach out to her office, she will likely need IV antibiotics if she does.

## 2025-03-04 ENCOUNTER — OFFICE VISIT (OUTPATIENT)
Dept: NEPHROLOGY | Facility: CLINIC | Age: 80
End: 2025-03-04
Payer: MEDICARE

## 2025-03-04 VITALS
DIASTOLIC BLOOD PRESSURE: 62 MMHG | HEART RATE: 94 BPM | SYSTOLIC BLOOD PRESSURE: 124 MMHG | WEIGHT: 168 LBS | HEIGHT: 60 IN | BODY MASS INDEX: 32.98 KG/M2

## 2025-03-04 DIAGNOSIS — I50.32 CHRONIC DIASTOLIC CONGESTIVE HEART FAILURE (HCC): ICD-10-CM

## 2025-03-04 DIAGNOSIS — D63.1 ANEMIA IN STAGE 3B CHRONIC KIDNEY DISEASE  (HCC): ICD-10-CM

## 2025-03-04 DIAGNOSIS — R33.9 URINARY RETENTION: ICD-10-CM

## 2025-03-04 DIAGNOSIS — N18.32 STAGE 3B CHRONIC KIDNEY DISEASE (HCC): Primary | ICD-10-CM

## 2025-03-04 DIAGNOSIS — I12.9 HYPERTENSIVE CHRONIC KIDNEY DISEASE WITH STAGE 1 THROUGH STAGE 4 CHRONIC KIDNEY DISEASE, OR UNSPECIFIED CHRONIC KIDNEY DISEASE: ICD-10-CM

## 2025-03-04 DIAGNOSIS — N18.32 ANEMIA IN STAGE 3B CHRONIC KIDNEY DISEASE  (HCC): ICD-10-CM

## 2025-03-04 PROCEDURE — 99214 OFFICE O/P EST MOD 30 MIN: CPT | Performed by: INTERNAL MEDICINE

## 2025-03-04 NOTE — PROGRESS NOTES
NEPHROLOGY OUTPATIENT PROGRESS NOTE   Mattie Varela 79 y.o. female MRN: [id number]  Reason for visit: CKD    Assessment & Plan  Stage 3b chronic kidney disease (HCC)  Chronic kidney disease, stage IIIb, baseline creatinine appears to be between 1.2-1.5, most recent creatinine 1.23 with GFR 41  Urinary retention  Chronic urinary retention with Bateman catheter in place.  Further management as per urology  Noted frequent UTIs with recent Klebsiella infection.  Antibiotic treatment as per primary service.  Anemia in stage 3b chronic kidney disease  (HCC)  Recent hemoglobin at 10.2.  Recommend oral iron supplementation  Repeat iron stores in 6 months.  Hypertensive chronic kidney disease with stage 1 through stage 4 chronic kidney disease, or unspecified chronic kidney disease  Blood pressure currently appears stable, no changes in her current regimen.    Chronic diastolic congestive heart failure (HCC)  Volume status currently appears to be fairly stable.  Would recommend daily weights.  Estimated dry weight approximately 165 pounds.  Could adjust torsemide to 40 twice daily for any significant weight gain.    Summary:  Overall renal function remains fairly stable  Continue with current antihypertensive and diuretic therapy  Recommend daily weights, estimated dry weight approximately 165 pounds.  Recommend repeating laboratory studies in approximately 3 months to ensure stability otherwise we will plan to see her in 6 months with repeat labs at that time.      SUBJECTIVE / INTERVAL HISTORY:  Unfortunately has had a number of hospital stays, each multifactorial, last hospital stay secondary to volume overload.  Discharged at that time on torsemide 30 twice daily.  Patient states that her weight appears to be stable at 165 pounds.  She does have chronic lower extremity swelling however denies any significant chest pain or shortness of breath.  Appetite is stable.  Trying to maintain fluid restriction, recommended 40 to  60 ounces per day.  Patient also apparently to be started on antibiotic treatment given UTI.  Does complain ol drainage and the pelvic discomfort.      OBJECTIVE:  /62 (BP Location: Left arm, Patient Position: Sitting, Cuff Size: Large)   Pulse 94   Ht 5' (1.524 m) Comment: verbal, pt in wheel chair.  Wt 76.2 kg (168 lb) Comment: verbal, pt in wheel chair.  LMP  (LMP Unknown)   BMI 32.81 kg/m²   Vitals:    03/04/25 1150   Weight: 76.2 kg (168 lb)       Physical Exam  Constitutional:       Appearance: She is obese. She is not ill-appearing.   Eyes:      General: No scleral icterus.  Cardiovascular:      Rate and Rhythm: Normal rate and regular rhythm.   Pulmonary:      Effort: Pulmonary effort is normal.      Breath sounds: Normal breath sounds.   Abdominal:      General: There is no distension.      Palpations: Abdomen is soft.   Musculoskeletal:      Right lower leg: Edema present.      Left lower leg: Edema present.   Skin:     General: Skin is warm and dry.      Findings: No rash.   Neurological:      Mental Status: She is alert and oriented to person, place, and time.           Medications:    Current Outpatient Medications:     acetaminophen (TYLENOL) 325 mg tablet, Take 3 tablets (975 mg total) by mouth every 8 (eight) hours, Disp: , Rfl:     albuterol (PROVENTIL HFA,VENTOLIN HFA) 90 mcg/act inhaler, Inhale 2 puffs every 6 (six) hours as needed for wheezing or shortness of breath, Disp: 6.7 g, Rfl: 5    aspirin 81 mg chewable tablet, Chew 81 mg daily, Disp: , Rfl:     atorvastatin (LIPITOR) 40 mg tablet, TAKE ONE TABLET BY MOUTH ONCE DAILY, Disp: 90 tablet, Rfl: 0    Blood Glucose Monitoring Suppl (OneTouch Verio Reflect) w/Device KIT, Check blood sugars twice daily. Please substitute with appropriate alternative as covered by patient's insurance. Dx: E11.65, Disp: 1 kit, Rfl: 0    docusate sodium (COLACE) 100 mg capsule, Take 1 capsule (100 mg total) by mouth 2 (two) times a day, Disp: , Rfl:      ergocalciferol (VITAMIN D2) 50,000 units, Take 50,000 Units by mouth Take I cap orally once monthly, Disp: , Rfl:     famotidine (PEPCID) 20 mg tablet, Take 20 mg by mouth 2 (two) times a day 1 tab orally twice daily as needed for heartburn/acid reflux, Disp: , Rfl:     fentaNYL (DURAGESIC) 75 mcg/hr, Place 1 patch on the skin every third day Apply 1 patch every 3 days, Disp: , Rfl:     ferrous sulfate 325 (65 Fe) mg tablet, Take 1 tablet (325 mg total) by mouth daily with breakfast, Disp: 90 tablet, Rfl: 0    gabapentin (NEURONTIN) 300 mg capsule, Take 1 capsule (300 mg total) by mouth daily at bedtime, Disp: , Rfl:     glucose blood (OneTouch Verio) test strip, Check blood sugars twice daily. Please substitute with appropriate alternative as covered by patient's insurance. Dx: E11.65, Disp: 200 each, Rfl: 3    latanoprost (XALATAN) 0.005 % ophthalmic solution, Administer 1 drop to both eyes daily at bedtime, Disp: , Rfl:     lidocaine (LIDODERM) 5 %, Apply 1 patch topically over 12 hours daily Remove & Discard patch within 12 hours or as directed by MD, Disp: , Rfl:     Lidocaine 4 % PTCH, Apply 4 % topically Apply 1 every morning to back and remove at bedtime, Disp: , Rfl:     loperamide (IMODIUM A-D) 2 MG tablet, Take 2 mg by mouth 4 (four) times a day as needed for diarrhea, Disp: , Rfl:     MAGNESIUM OXIDE 400 PO, Take 400 mg by mouth 2 (two) times a day, Disp: , Rfl:     melatonin 5 MG TABS, Take 1 tablet (5 mg total) by mouth daily at bedtime 1 tab orally at bedtime, Disp: , Rfl:     metFORMIN (GLUCOPHAGE) 500 mg tablet, Take 1 tablet (500 mg total) by mouth 2 (two) times a day with meals, Disp: , Rfl:     methocarbamol (ROBAXIN) 750 mg tablet, Take 750 mg by mouth every 8 (eight) hours 1 tab orally every 8 hours as needed for muscle spasms, Disp: , Rfl:     metoprolol succinate (TOPROL-XL) 25 mg 24 hr tablet, Take 0.5 tablets (12.5 mg total) by mouth daily, Disp: , Rfl:     Milnacipran HCl (Savella) 100 MG  TABS, Take 1 tablet (100 mg total) by mouth daily, Disp: 30 tablet, Rfl: 3    morphine (MSIR) 15 mg tablet, Take 1 tablet (15 mg total) by mouth every 6 (six) hours Max Daily Amount: 60 mg, Disp: 30 tablet, Rfl: 0    nitrofurantoin (MACROBID) 100 mg capsule, Take 1 capsule (100 mg total) by mouth in the morning for 7 days, Disp: 7 capsule, Rfl: 0    ondansetron (ZOFRAN) 4 mg tablet, Take 1 tablet (4 mg total) by mouth every 8 (eight) hours as needed for nausea or vomiting, Disp: 20 tablet, Rfl: 0    OneTouch Delica Lancets 33G MISC, Check blood sugars twice daily. Please substitute with appropriate alternative as covered by patient's insurance. Dx: E11.65, Disp: 200 each, Rfl: 3    polyethylene glycol (MIRALAX) 17 g packet, Take 17 g by mouth daily, Disp: , Rfl:     senna (SENOKOT) 8.6 mg, Take 2 tablets (17.2 mg total) by mouth every morning, Disp: , Rfl:     simethicone (MYLICON) 80 mg chewable tablet, Chew 80 mg every 6 (six) hours as needed for flatulence Chew and swallow 1 tab orally 3 times daily as needed for gas, Disp: , Rfl:     torsemide (DEMADEX) 10 mg tablet, Take 3 tablets (30 mg total) by mouth 2 (two) times a day, Disp: , Rfl:     torsemide (DEMADEX) 20 mg tablet, Take 20 mg by mouth if needed (leg swelling), Disp: , Rfl:     trospium chloride (SANCTURA) 20 mg tablet, Take 1 tablet (20 mg total) by mouth 2 (two) times a day, Disp: 60 tablet, Rfl: 6    zolpidem (AMBIEN CR) 6.25 MG CR tablet, Take 6.25 mg by mouth daily at bedtime as needed for sleep, Disp: , Rfl:     levothyroxine 75 mcg tablet, Take 0.5 tablets (37.5 mcg total) by mouth daily in the early morning, Disp: , Rfl:     oxymetazoline (AFRIN) 0.05 % nasal spray, 2 sprays by Each Nare route every 12 (twelve) hours as needed for congestion for up to 3 days, Disp: 6 mL, Rfl: 0    sucralfate (CARAFATE) 1 g tablet, Take 1 tablet (1 g total) by mouth 2 (two) times a day before meals for 7 days, Disp: 14 tablet, Rfl: 0    Laboratory  Results:  Results for orders placed or performed in visit on 02/18/25   Basic metabolic panel   Result Value Ref Range    Sodium 138 135 - 147 mmol/L    Potassium 3.7 3.5 - 5.3 mmol/L    Chloride 93 (L) 96 - 108 mmol/L    CO2 33 (H) 21 - 32 mmol/L    ANION GAP 12 4 - 13 mmol/L    BUN 16 5 - 25 mg/dL    Creatinine 1.23 0.60 - 1.30 mg/dL    Glucose 122 65 - 140 mg/dL    Calcium 9.3 8.4 - 10.2 mg/dL    eGFR 41 ml/min/1.73sq m     *Note: Due to a large number of results and/or encounters for the requested time period, some results have not been displayed. A complete set of results can be found in Results Review.

## 2025-03-04 NOTE — ASSESSMENT & PLAN NOTE
Chronic kidney disease, stage IIIb, baseline creatinine appears to be between 1.2-1.5, most recent creatinine 1.23 with GFR 41

## 2025-03-04 NOTE — ASSESSMENT & PLAN NOTE
Chronic urinary retention with Bateman catheter in place.  Further management as per urology  Noted frequent UTIs with recent Klebsiella infection.  Antibiotic treatment as per primary service.

## 2025-03-04 NOTE — ASSESSMENT & PLAN NOTE
[facility] Hospitalist Progress Note     CC: Hospital Follow up    PCP: Brannon Thompson MD       Assessment/Plan:     Principal Problem:    Inability to walk  Active Problems:    CVA (cerebral vascular accident) [facility])    Generalized anxiety disorder    Nichelle Volume status currently appears to be fairly stable.  Would recommend daily weights.  Estimated dry weight approximately 165 pounds.  Could adjust torsemide to 40 twice daily for any significant weight gain.    Summary:  Overall renal function remains fairly stable  Continue with current antihypertensive and diuretic therapy  Recommend daily weights, estimated dry weight approximately 165 pounds.  Recommend repeating laboratory studies in approximately 3 months to ensure stability otherwise we will plan to see her in 6 months with repeat labs at that time.     weakness    OBJECTIVE:    Blood pressure 154/83, pulse 95, temperature 97.9 °F (36.6 °C), temperature source Oral, resp. rate 18, height 5' 4\" (1.626 m), weight 237 lb 9.6 oz (107.8 kg), SpO2 98 %, not currently breastfeeding.     Temp:  [97.9 °F (36.6 °C) (cpt=70551)    Result Date: 2/15/2019  CONCLUSION:  1.  Multiple foci of abnormal diffusion restriction in the left and right cerebellar hemispheres compatible with acute/recent infarcts.   Multiple vascular distributions associated with these sites raises

## 2025-03-13 ENCOUNTER — REMOTE DEVICE CLINIC VISIT (OUTPATIENT)
Dept: CARDIOLOGY CLINIC | Facility: CLINIC | Age: 80
End: 2025-03-13
Payer: MEDICARE

## 2025-03-13 DIAGNOSIS — I63.9 CRYPTOGENIC STROKE (HCC): Primary | ICD-10-CM

## 2025-03-13 PROCEDURE — 93298 REM INTERROG DEV EVAL SCRMS: CPT | Performed by: INTERNAL MEDICINE

## 2025-03-13 NOTE — PROGRESS NOTES
"MDT LNQ22 ICM - ACTIVE SYSTEM IS MRI CONDITIONAL   CARELINK TRANSMISSION: LOOP RECORDER. PRESENTING RHYTHM ST @ 100 BPM. BATTERY STATUS \"OK.\" NO PATIENT OR DEVICE ACTIVATED EPISODES. NORMAL DEVICE FUNCTION. DL   "

## 2025-03-14 ENCOUNTER — RESULTS FOLLOW-UP (OUTPATIENT)
Dept: CARDIOLOGY CLINIC | Facility: CLINIC | Age: 80
End: 2025-03-14

## 2025-03-17 ENCOUNTER — NURSE TRIAGE (OUTPATIENT)
Age: 80
End: 2025-03-17

## 2025-03-17 DIAGNOSIS — R39.9 UTI SYMPTOMS: Primary | ICD-10-CM

## 2025-03-17 NOTE — TELEPHONE ENCOUNTER
FOLLOW UP: I ordered UA and culture, faxed to frooly at [phone]     REASON FOR CONVERSATION: Blood in Urine    SYMPTOMS: Right sided back and bladder pain, blood in urine the color of cranberry juice    OTHER: Her symptoms started this morning at 5 am. Denies fever or blood clots. Patient is not taking any blood thinners.     DISPOSITION: Order Lab Work (overriding See Within 2 Weeks in Office)    Reason for Disposition   Patient presents with gross hematuria with small clots and able to urinate, new onset, with or without history of bladder cancer    Answer Assessment - Initial Assessment Questions  1. When did you start with blood in your urine, and can you describe things in detail? Do you have any blood clots, is the hematuria continuous or intermittent and can you describe the color of the blood in your urine in relation to a beverage?   A little cranberry juice, started at 5 am, had a ahn so unsure if it is continuous or intermittent    2. Do you have lower back, flank, or lower abdominal/bladder pain?   Right sided back pain, bladder pain    3. Are you experiencing urinary frequency, urgency, pain or burning or any other changes in your urination like being unable to urinate?   Denies    4. Do you take any blood thinners?   Denies    6. Have you had any recent urine testing or imaging? (UA with micro, urine culture, kidney ultrasound, CT scan)? Or any recent urologic surgery or procedures?   Last urine testing was 2/26/2025    Protocols used: Urology-Gross Hemaruria-ADULT-[place]

## 2025-03-21 NOTE — TELEPHONE ENCOUNTER
Bateman removal first thing in the morning on week of 12/11  Encourage hydration and monitoring for voiding    in the afternoon after approximately 4-6 hours for straight cath post void residual, utilizing 16F straight catheter  If PVR >350 mL please retain indwelling catheter size 16F with a 5-10 cc balloon.   Call office with results of void trial at [phone] Medication: trazodone, bupropion   Medication refill denied due to medications discontinued.

## 2025-03-26 ENCOUNTER — PROCEDURE VISIT (OUTPATIENT)
Dept: UROLOGY | Facility: CLINIC | Age: 80
End: 2025-03-26
Payer: MEDICARE

## 2025-03-26 DIAGNOSIS — R33.9 URINARY RETENTION: Primary | ICD-10-CM

## 2025-03-26 PROCEDURE — 51702 INSERT TEMP BLADDER CATH: CPT

## 2025-03-26 NOTE — TELEPHONE ENCOUNTER
Pt was seen in office today for catheter change. When I was going to change the catheter, I noticed she had a catheter that was not the ones we use in this clinic. I asked and she states that a nurse from [facility] goes in to where she resides to change it. I asked when it was last changed but she does not remember. I asked if she had a scheduled next change with them and she is unsure. I asked for [facility]'s phone number to get more information and clarification. She gave the phone number [phone]. I left a message for ARLETTE Lozano to call office back to obtain that information. Pt would like changes in her residence as it is difficult to obtain transportation and get around. Will await on phone call back.       [Alert] : alert [Healthy Appearance] : healthy appearance [No Proptosis] : no proptosis [No Lid Lag] : no lid lag [Normal Hearing] : hearing was normal [No LAD] : no lymphadenopathy [Thyroid Not Enlarged] : the thyroid was not enlarged [Clear to Auscultation] : lungs were clear to auscultation bilaterally [Normal S1, S2] : normal S1 and S2 [Regular Rhythm] : with a regular rhythm [No Edema] : no peripheral edema [Pedal Pulses Normal] : the pedal pulses are present [Normal Sensation on Monofilament Testing] : normal sensation on monofilament testing of lower extremities [Normal Affect] : the affect was normal [Normal Mood] : the mood was normal [Foot Ulcers] : no foot ulcers [de-identified] : reduced arches in feet

## 2025-03-26 NOTE — PROGRESS NOTES
"3/26/2025  Mattie Varela is a 80 y.o. female  [id number]    Diagnosis:      Patient presents for routine ahn catheter change managed by Dr. Ramirez    Plan:  Patient will return as scheduled for next change  Patient instructed to call with any questions or concerns in the meantime.       Procedure:    Universal Protocol:  procedure performed by consultantConsent: Verbal consent obtained.  Risks and benefits: risks, benefits and alternatives were discussed  Consent given by: patient  Time out: Immediately prior to procedure a \"time out\" was called to verify the correct patient, procedure, equipment, support staff and site/side marked as required.  Patient understanding: patient states understanding of the procedure being performed  Patient consent: the patient's understanding of the procedure matches consent given  Patient identity confirmed: verbally with patient    Bladder catheterization    Date/Time: 3/26/2025 1:00 PM    Performed by: Mahnaz Byrd RN  Authorized by: Torey Draper MD    Patient location:  Bedside  Consent:     Consent given by:  Patient  Universal protocol:     Procedure explained and questions answered to patient or proxy's satisfaction: yes      Immediately prior to procedure a time out was called: yes      Patient identity confirmed:  Verbally with patient  Pre-procedure details:     Procedure purpose:  Therapeutic    Preparation: Patient was prepped and draped in usual sterile fashion    Anesthesia (see MAR for exact dosages):     Anesthesia method:  None  Procedure details:     Bladder irrigation: no      Catheter insertion:  Indwelling    Approach: natural orifice      Catheter type:  Latex and Ahn    Catheter size:  16 Fr    Number of attempts:  1    Successful placement: yes      Urine characteristics:  Mildly cloudy  Post-procedure details:     Patient tolerance of procedure:  Tolerated well, no immediate complications  Comments:      Ahn removed after deflation of intact " balloon. New ahn inserted with no issues. Flushed for return. 10 mL of sterile water inflated into balloon. Attached to overnight bag and stat lock. Extra supplies provided per pt request.           Mahnaz Byrd RN

## 2025-03-28 NOTE — TELEPHONE ENCOUNTER
DYLAN Patterson. If she or anyone calls back from [employer], please get a nurse on the phone.

## 2025-04-01 NOTE — PROGRESS NOTES
Cardiology Follow Up    [facility] [address] [address]  PHONE: [phone]  FAX: [phone]    Mattie Varela  1945  [id number]    Assessment/Plan:  Acute on chronic HFpEF, LVEF 51%, AHA stage D  Hyponatremia  CKD stage III  Urinary retention with Bateman catheter  Spinal stimulator  Anemia of chronic disease  Type 2 diabetes  COPD  History of patella fracture after a fall in November 2024  Chronic pain disorder with spinal stimulator in place  Chronic opiate use  Glaucoma  Hypothyroidism    Patient is hypervolemic today. I am not aware whether she has been dosed with the PRN torsemide. There is no medication list from [facility] or [facility].    I would like to increase her daily torsemide from 30 mg BID to 60 mg BID for 5 days. Take Klor 20 mEq QD during these 5 days. After 5 days, use Torsemide 40 mg BID.    She had CMP in the ED on 4/4 showing S Cr 1.5. Baseline is 1.2-1.5. Check BMP again approx 1-2 weeks after completing torsemide bolus (~May 2nd). I will fax this note and orders to [facility].    RTO in 2 months w/ me or Dr. Boyer    Interval History:   Mattie Varela is a 80 y.o. female with past medical history as below who presents to the office for continued CHF follow-up.  Patient reports she is concerned about weight gain and swelling in the legs with some tenderness in both legs, left leg draining.  She has catheter in place with urine output.  Patient denies shortness of breath or DUNBAR.  She is not mobile due to chronic pain for which she receives opioids.  She has chronic constipation because of opioid use.  She feels abdominal swelling which she is not sure whether it is related to the constipation or possible fluid overload.  Her mouth is very dry and this is her main complaint to be honest.  I told her I will ask [facility] physician to review her medication list to possibly make changes because her dry mouth is most likely  due to medication side effects.    Past Medical History:   Diagnosis Date    Acid reflux     Acute kidney injury (HCC) 06/18/2022    Anemia     hx of iron-deficient    Anxiety     Arthritis     Asthma     last needed inhaler last year 2020    Basal cell carcinoma     upper lip    Chronic narcotic dependence (HCC)     Chronic pain     Colitis 11/19/2024    Colon polyp     Cystocele     Diabetes mellitus (HCC)     stable    Disease of thyroid gland     hypothyroidism    Diverticulosis     Dizziness     at times    Dysfunctional uterine bleeding     last assessed - [id number]    Dysphagia     Fibromyalgia     Gastric ulcer     Gastroparesis     History of colonic polyps     last assessed - [id number]    History of gastroesophageal reflux (GERD)     Hypercholesterolemia     Hyperlipidemia     Hypertension     Hyponatremia 01/13/2024    IBS (irritable bowel syndrome)     Pneumobilia 06/18/2022    Post laminectomy syndrome     S/P insertion of spinal cord stimulator 07/18/2018    s/p Medtronic loop recorder 3/2/2024 03/02/2024    Seasonal allergies     Shortness of breath     exertional    Spinal stenosis     Status post lumbar spinal fusion 03/16/2018    Stroke ([facility])     pt states slight stroke March 2022      Past Surgical History:   Procedure Laterality Date    APPENDECTOMY      BACK SURGERY      BREAST CYST EXCISION Left     CARDIAC CATHETERIZATION  11/14/2023    Procedure: Cardiac catheterization;  Surgeon: Randolph Holt MD;  [facility];  Service: Cardiology    CARDIAC CATHETERIZATION N/A 11/14/2023    Procedure: Cardiac Coronary Angiogram;  Surgeon: Randolph Holt MD;  [facility];  Service: Cardiology    CARDIAC ELECTROPHYSIOLOGY PROCEDURE N/A 3/2/2024    Procedure: Cardiac loop recorder implant;  Surgeon: Edu Fontaine MD;  [facility];  Service: Cardiology    CHOLECYSTECTOMY      COLONOSCOPY      ESOPHAGOGASTRODUODENOSCOPY N/A 09/28/2016    Procedure:  ESOPHAGOGASTRODUODENOSCOPY (EGD);  Surgeon: Mylene Moeller MD;  Location: [facility];  Service:     HERNIA REPAIR      HYSTERECTOMY      TTAH-BSO age 28    LAMINECTOMY      LUMBAR LAMINECTOMY      OOPHORECTOMY      age 28    AZ ARTHRODESIS POSTERIOR/PSTLAT TQ 1NTRSPC THORACIC N/A 06/04/2018    Procedure: Reopening of lumbar incision for T12-L5 posterior instrumented fixation and fusion and T12-L4 posterior decompression;  Surgeon: Wood Rogel MD;  Location: [facility];  Service: Neurosurgery    AZ COLONOSCOPY FLX DX W/COLLJ SPEC WHEN PFRMD N/A 03/02/2016    Procedure: EGD AND COLONOSCOPY;  Surgeon: Mylene Moeller MD;  [facility]: [facility];  Service: Gastroenterology    AZ DILATION ESOPH UNGUIDED SOUND/BOUGIE 1/MULT PASS N/A 09/28/2016    Procedure: DILATATION ESOPHAGEAL;  Surgeon: Mylene Moeller MD;  [facility]: [facility];  Service: Gastroenterology    AZ ESOPHAGOGASTRODUODENOSCOPY TRANSORAL DIAGNOSTIC N/A 07/18/2016    Procedure: ESOPHAGOGASTRODUODENOSCOPY (EGD);  Surgeon: Mylene Moeller MD;  [facility]: [facility];  Service: Gastroenterology    AZ INSJ/RPLCMT SPINAL NPG/RCVR POCKET CRTJ&CONNJ Left 06/04/2018    Procedure: removal of left buttock implantable pulse generator and placement of new  implantable pulse generator;  Surgeon: Wood Rogel MD;  [facility]: [facility];  Service: Neurosurgery      Family History   Problem Relation Age of Onset    Lung cancer Mother 46    Pulmonary embolism Father     No Known Problems Sister     No Known Problems Daughter     No Known Problems Daughter     Stroke Maternal Grandmother     Heart attack Maternal Grandfather     No Known Problems Paternal Grandmother     No Known Problems Paternal Grandfather     No Known Problems Maternal Aunt     Diabetes Family         Diabetes mellitus    Hypertension Family     Stroke Family         Stroke complications      Current Outpatient Medications:     acetaminophen (TYLENOL) 325 mg tablet, Take 3 tablets (975 mg total) by mouth every 8  (eight) hours, Disp: , Rfl:     albuterol (PROVENTIL HFA,VENTOLIN HFA) 90 mcg/act inhaler, Inhale 2 puffs every 6 (six) hours as needed for wheezing or shortness of breath, Disp: 6.7 g, Rfl: 5    aspirin 81 mg chewable tablet, Chew 81 mg daily, Disp: , Rfl:     atorvastatin (LIPITOR) 40 mg tablet, TAKE ONE TABLET BY MOUTH ONCE DAILY, Disp: 90 tablet, Rfl: 0    Blood Glucose Monitoring Suppl (OneTouch Verio Reflect) w/Device KIT, Check blood sugars twice daily. Please substitute with appropriate alternative as covered by patient's insurance. Dx: E11.65, Disp: 1 kit, Rfl: 0    docusate sodium (COLACE) 100 mg capsule, Take 1 capsule (100 mg total) by mouth 2 (two) times a day, Disp: , Rfl:     ergocalciferol (VITAMIN D2) 50,000 units, Take 50,000 Units by mouth Take I cap orally once monthly, Disp: , Rfl:     famotidine (PEPCID) 20 mg tablet, Take 20 mg by mouth 2 (two) times a day 1 tab orally twice daily as needed for heartburn/acid reflux, Disp: , Rfl:     ferrous sulfate 325 (65 Fe) mg tablet, Take 1 tablet (325 mg total) by mouth daily with breakfast, Disp: 90 tablet, Rfl: 0    gabapentin (NEURONTIN) 300 mg capsule, Take 1 capsule (300 mg total) by mouth daily at bedtime, Disp: , Rfl:     glucose blood (OneTouch Verio) test strip, Check blood sugars twice daily. Please substitute with appropriate alternative as covered by patient's insurance. Dx: E11.65, Disp: 200 each, Rfl: 3    latanoprost (XALATAN) 0.005 % ophthalmic solution, Administer 1 drop to both eyes daily at bedtime, Disp: , Rfl:     levothyroxine 75 mcg tablet, Take 0.5 tablets (37.5 mcg total) by mouth daily in the early morning, Disp: , Rfl:     lidocaine (LIDODERM) 5 %, Apply 1 patch topically over 12 hours daily Remove & Discard patch within 12 hours or as directed by MD, Disp: , Rfl:     Lidocaine 4 % PTCH, Apply 4 % topically Apply 1 every morning to back and remove at bedtime, Disp: , Rfl:     loperamide (IMODIUM A-D) 2 MG tablet, Take 2 mg by  mouth 4 (four) times a day as needed for diarrhea, Disp: , Rfl:     MAGNESIUM OXIDE 400 PO, Take 400 mg by mouth 2 (two) times a day, Disp: , Rfl:     methocarbamol (ROBAXIN) 750 mg tablet, Take 750 mg by mouth every 8 (eight) hours 1 tab orally every 8 hours as needed for muscle spasms, Disp: , Rfl:     metoprolol succinate (TOPROL-XL) 25 mg 24 hr tablet, Take 0.5 tablets (12.5 mg total) by mouth daily, Disp: , Rfl:     Milnacipran HCl (Savella) 100 MG TABS, Take 1 tablet (100 mg total) by mouth daily, Disp: 30 tablet, Rfl: 3    morphine (MSIR) 15 mg tablet, Take 1 tablet (15 mg total) by mouth every 6 (six) hours Max Daily Amount: 60 mg, Disp: 30 tablet, Rfl: 0    ondansetron (ZOFRAN) 4 mg tablet, Take 1 tablet (4 mg total) by mouth every 8 (eight) hours as needed for nausea or vomiting, Disp: 20 tablet, Rfl: 0    OneTouch Delica Lancets 33G MISC, Check blood sugars twice daily. Please substitute with appropriate alternative as covered by patient's insurance. Dx: E11.65, Disp: 200 each, Rfl: 3    polyethylene glycol (MIRALAX) 17 g packet, Take 17 g by mouth daily, Disp: , Rfl:     potassium chloride (Klor-Con M20) 20 mEq tablet, Take 1 tablet (20 mEq total) by mouth daily, Disp: 5 tablet, Rfl: 0    senna (SENOKOT) 8.6 mg, Take 2 tablets (17.2 mg total) by mouth every morning, Disp: , Rfl:     torsemide (DEMADEX) 10 mg tablet, Increase to 60 mg BID for 5 days, then decrease to 40 mg BID, Disp: , Rfl:     torsemide (DEMADEX) 20 mg tablet, Take 20 mg by mouth if needed (leg swelling), Disp: , Rfl:     trospium chloride (SANCTURA) 20 mg tablet, Take 1 tablet (20 mg total) by mouth 2 (two) times a day, Disp: 60 tablet, Rfl: 6    zolpidem (AMBIEN CR) 6.25 MG CR tablet, Take 6.25 mg by mouth daily at bedtime as needed for sleep, Disp: , Rfl:     fentaNYL (DURAGESIC) 75 mcg/hr, Place 1 patch on the skin every third day Apply 1 patch every 3 days, Disp: , Rfl:     simethicone (MYLICON) 80 mg chewable tablet, Chew 80 mg  "every 6 (six) hours as needed for flatulence Chew and swallow 1 tab orally 3 times daily as needed for gas, Disp: , Rfl:      Allergies   Allergen Reactions    Penicillins Anaphylaxis, Hives and Other (See Comments)     Other reaction(s): Unknown Reaction    Sulfa Antibiotics Anaphylaxis and Other (See Comments)     Other reaction(s): Unknown Reaction    Aspartame - Food Allergy Other (See Comments) and Hypertension     Slurred speech, weakness, stroke sx    Iodinated Contrast Media Hives     Pt has taken prep prior for contrast and has not had any break through reaction    Keflex [Cephalexin] Hives     Physical Exam:  Vitals:    04/11/25 1043   BP: 138/64   Pulse: 84     Vitals:    04/11/25 1043   Weight: 79.8 kg (176 lb)       Body mass index is 34.37 kg/m².    Physical Exam  Vitals and nursing note reviewed.   Constitutional:       General: She is not in acute distress.     Appearance: She is not ill-appearing.      Comments: In wc   HENT:      Head: Normocephalic and atraumatic.      Nose: No congestion.      Mouth/Throat:      Mouth: Mucous membranes are dry.   Eyes:      Conjunctiva/sclera: Conjunctivae normal.   Cardiovascular:      Rate and Rhythm: Normal rate and regular rhythm.      Heart sounds: S1 normal and S2 normal. Murmur heard.      Systolic murmur is present with a grade of 1/6.   Abdominal:      General: Abdomen is flat.   Neurological:      General: No focal deficit present.      Mental Status: She is alert.      Comments: Grossly moving all limbs   Psychiatric:         Behavior: Behavior normal.     Data:  Cardiac EP device report: 3/13/2025 LOOP RECORDER. PRESENTING RHYTHM ST @ 100 BPM. BATTERY STATUS \"OK.\" NO PATIENT OR DEVICE ACTIVATED EPISODES. NORMAL DEVICE FUNCTION.     Kylie Sommers PA-C  [facility]  "

## 2025-04-04 ENCOUNTER — HOSPITAL ENCOUNTER (EMERGENCY)
Facility: HOSPITAL | Age: 80
Discharge: HOME | End: 2025-04-05
Payer: MEDICARE

## 2025-04-04 ENCOUNTER — APPOINTMENT (EMERGENCY)
Dept: CT IMAGING | Facility: HOSPITAL | Age: 80
End: 2025-04-04
Payer: MEDICARE

## 2025-04-04 ENCOUNTER — APPOINTMENT (EMERGENCY)
Dept: RADIOLOGY | Facility: HOSPITAL | Age: 80
End: 2025-04-04
Payer: MEDICARE

## 2025-04-04 DIAGNOSIS — G89.29 CHRONIC PAIN: Primary | ICD-10-CM

## 2025-04-04 LAB
ALBUMIN SERPL BCG-MCNC: 3.8 G/DL (ref 3.5–5)
ALP SERPL-CCNC: 131 U/L (ref 34–104)
ALT SERPL W P-5'-P-CCNC: 12 U/L (ref 7–52)
ANION GAP SERPL CALCULATED.3IONS-SCNC: 9 MMOL/L (ref 4–13)
AST SERPL W P-5'-P-CCNC: 14 U/L (ref 13–39)
BASOPHILS # BLD AUTO: 0.05 THOUSANDS/ÂΜL (ref 0–0.1)
BASOPHILS NFR BLD AUTO: 1 % (ref 0–1)
BILIRUB SERPL-MCNC: 0.46 MG/DL (ref 0.2–1)
BUN SERPL-MCNC: 22 MG/DL (ref 5–25)
CALCIUM SERPL-MCNC: 9.5 MG/DL (ref 8.4–10.2)
CARDIAC TROPONIN I PNL SERPL HS: 8 NG/L (ref ?–50)
CHLORIDE SERPL-SCNC: 92 MMOL/L (ref 96–108)
CO2 SERPL-SCNC: 35 MMOL/L (ref 21–32)
CREAT SERPL-MCNC: 1.55 MG/DL (ref 0.6–1.3)
EOSINOPHIL # BLD AUTO: 0.33 THOUSAND/ÂΜL (ref 0–0.61)
EOSINOPHIL NFR BLD AUTO: 4 % (ref 0–6)
ERYTHROCYTE [DISTWIDTH] IN BLOOD BY AUTOMATED COUNT: 14.8 % (ref 11.6–15.1)
GFR SERPL CREATININE-BSD FRML MDRD: 30 ML/MIN/1.73SQ M
GLUCOSE SERPL-MCNC: 166 MG/DL (ref 65–140)
HCT VFR BLD AUTO: 34.1 % (ref 34.8–46.1)
HGB BLD-MCNC: 10.6 G/DL (ref 11.5–15.4)
IMM GRANULOCYTES # BLD AUTO: 0.02 THOUSAND/UL (ref 0–0.2)
IMM GRANULOCYTES NFR BLD AUTO: 0 % (ref 0–2)
LIPASE SERPL-CCNC: 22 U/L (ref 11–82)
LYMPHOCYTES # BLD AUTO: 1.84 THOUSANDS/ÂΜL (ref 0.6–4.47)
LYMPHOCYTES NFR BLD AUTO: 20 % (ref 14–44)
MCH RBC QN AUTO: 27.6 PG (ref 26.8–34.3)
MCHC RBC AUTO-ENTMCNC: 31.1 G/DL (ref 31.4–37.4)
MCV RBC AUTO: 89 FL (ref 82–98)
MONOCYTES # BLD AUTO: 1.12 THOUSAND/ÂΜL (ref 0.17–1.22)
MONOCYTES NFR BLD AUTO: 12 % (ref 4–12)
NEUTROPHILS # BLD AUTO: 5.76 THOUSANDS/ÂΜL (ref 1.85–7.62)
NEUTS SEG NFR BLD AUTO: 63 % (ref 43–75)
NRBC BLD AUTO-RTO: 0 /100 WBCS
PLATELET # BLD AUTO: 245 THOUSANDS/UL (ref 149–390)
PMV BLD AUTO: 10.6 FL (ref 8.9–12.7)
POTASSIUM SERPL-SCNC: 4 MMOL/L (ref 3.5–5.3)
PROT SERPL-MCNC: 7.1 G/DL (ref 6.4–8.4)
RBC # BLD AUTO: 3.84 MILLION/UL (ref 3.81–5.12)
SODIUM SERPL-SCNC: 136 MMOL/L (ref 135–147)
WBC # BLD AUTO: 9.12 THOUSAND/UL (ref 4.31–10.16)

## 2025-04-04 PROCEDURE — 85025 COMPLETE CBC W/AUTO DIFF WBC: CPT

## 2025-04-04 PROCEDURE — 99284 EMERGENCY DEPT VISIT MOD MDM: CPT

## 2025-04-04 PROCEDURE — 93005 ELECTROCARDIOGRAM TRACING: CPT

## 2025-04-04 PROCEDURE — 36415 COLL VENOUS BLD VENIPUNCTURE: CPT

## 2025-04-04 PROCEDURE — 71045 X-RAY EXAM CHEST 1 VIEW: CPT

## 2025-04-04 PROCEDURE — 96374 THER/PROPH/DIAG INJ IV PUSH: CPT

## 2025-04-04 PROCEDURE — 74176 CT ABD & PELVIS W/O CONTRAST: CPT

## 2025-04-04 PROCEDURE — 99285 EMERGENCY DEPT VISIT HI MDM: CPT

## 2025-04-04 PROCEDURE — 84484 ASSAY OF TROPONIN QUANT: CPT

## 2025-04-04 PROCEDURE — 83690 ASSAY OF LIPASE: CPT

## 2025-04-04 PROCEDURE — 80053 COMPREHEN METABOLIC PANEL: CPT

## 2025-04-04 RX ORDER — SODIUM CHLORIDE 9 MG/ML
3 INJECTION INTRAVENOUS
Status: DISCONTINUED | OUTPATIENT
Start: 2025-04-04 | End: 2025-04-05 | Stop reason: HOSPADM

## 2025-04-04 RX ORDER — HYDROMORPHONE HCL/PF 1 MG/ML
0.5 SYRINGE (ML) INJECTION ONCE
Status: COMPLETED | OUTPATIENT
Start: 2025-04-04 | End: 2025-04-04

## 2025-04-04 RX ADMIN — HYDROMORPHONE HYDROCHLORIDE 0.5 MG: 1 INJECTION, SOLUTION INTRAMUSCULAR; INTRAVENOUS; SUBCUTANEOUS at 22:40

## 2025-04-05 VITALS
HEART RATE: 89 BPM | OXYGEN SATURATION: 96 % | RESPIRATION RATE: 20 BRPM | DIASTOLIC BLOOD PRESSURE: 58 MMHG | HEIGHT: 60 IN | SYSTOLIC BLOOD PRESSURE: 134 MMHG | TEMPERATURE: 99 F | BODY MASS INDEX: 32.81 KG/M2

## 2025-04-05 LAB
2HR DELTA HS TROPONIN: 0 NG/L
ATRIAL RATE: 91 BPM
ATRIAL RATE: 95 BPM
CARDIAC TROPONIN I PNL SERPL HS: 8 NG/L (ref ?–50)
P AXIS: 46 DEGREES
P AXIS: 48 DEGREES
PR INTERVAL: 148 MS
PR INTERVAL: 150 MS
QRS AXIS: 24 DEGREES
QRS AXIS: 26 DEGREES
QRSD INTERVAL: 128 MS
QRSD INTERVAL: 134 MS
QT INTERVAL: 422 MS
QT INTERVAL: 432 MS
QTC INTERVAL: 530 MS
QTC INTERVAL: 531 MS
T WAVE AXIS: 30 DEGREES
T WAVE AXIS: 50 DEGREES
VENTRICULAR RATE: 91 BPM
VENTRICULAR RATE: 95 BPM

## 2025-04-05 PROCEDURE — 93010 ELECTROCARDIOGRAM REPORT: CPT | Performed by: INTERNAL MEDICINE

## 2025-04-05 PROCEDURE — 93005 ELECTROCARDIOGRAM TRACING: CPT

## 2025-04-05 PROCEDURE — 36415 COLL VENOUS BLD VENIPUNCTURE: CPT

## 2025-04-05 PROCEDURE — 84484 ASSAY OF TROPONIN QUANT: CPT

## 2025-04-05 NOTE — ED PROVIDER NOTES
Time reflects when diagnosis was documented in both MDM as applicable and the Disposition within this note       Time User Action Codes Description Comment    4/5/2025  1:52 AM Marlon Aguilar Add [G89.29] Chronic pain           ED Disposition       ED Disposition   Discharge    Condition   Stable    Date/Time   Sat Apr 5, 2025  1:52 AM    Comment   Mattie CARLEY Varela discharge to home/self care.                   Assessment & Plan       Medical Decision Making  Patient with history as below presented with generalized pain. History obtained from patient.    After initial evaluation differential diagnosis includes: Chronic pain, chronic opioid use, deconditioning, less likely to represent arrhythmia, anemia, electrolyte disturbance, ACS, obstruction, or intra-abdominal emergency.     Plan: CBC, CMP, troponin, ECG, chest x-ray, CT abdomen pelvis, Dilaudid    I considered the patient's chronic medical conditions including their diabetes in forming my differential diagnosis, plan, and my medical decision making. I also reviewed their external records including office visit 3/4/2024.    ED summary: ECG independently interpreted by myself as below. Labs reviewed and unremarkable including negative delta troponin. Independently reviewed imaging without acute cardiopulmonary disease, vRad's read with no acute intra-abdominal pathology. Patient was treated with below with improvement in symptoms. Reassessed the patient and they continue to be well appearing.  Overall, given the patient's reassuring clinical exam, slow progression of her symptoms, reassuring workup including labs and imaging here, as well as improvement in symptoms with pain management here I think that her presentation likely represents poorly controlled chronic pain.  I think that she would benefit from pain management who she has previously seen.  I placed an ambulatory referral to them. Stable for outpatient management.    Disposition: Discharged with  instructions to obtain outpatient follow up of patient's symptoms and findings, with strict return precautions if patient develops new or worsening symptoms. Patient understands this plan and is agreeable. All questions answered. Patient discharged home with return precautions.    Amount and/or Complexity of Data Reviewed  Labs: ordered. Decision-making details documented in ED Course.  Radiology: ordered and independent interpretation performed.    Risk  Prescription drug management.        ED Course as of 04/05/25 0955   Fri Apr 04, 2025 2311 Creatinine(!): 1.55  Near baseline   2311 Hemoglobin(!): 10.6  Near basleine   2316 Procedure Note: EKG  Date/Time: 04/04/25 11:19 PM   Interpreted by: Marlon Aguilar   Indications / Diagnosis: pain  ECG reviewed by me, the ED Provider: yes   The EKG demonstrates:  Rhythm: normal sinus  Intervals: normal intervals  Axis: normal axis  QRS/Blocks: LBBB  ST Changes: No acute ST Changes, no STD/ELLYN.   Sat Apr 05, 2025   0120 Delta 2hr hsTnI: 0   0145 Vrads read:    IMPRESSION: No acute findings in the abdomen or pelvis.        Medications   HYDROmorphone (DILAUDID) injection 0.5 mg (0.5 mg Intravenous Given 4/4/25 2240)       ED Risk Strat Scores                            SBIRT 22yo+      Flowsheet Row Most Recent Value   Initial Alcohol Screen: US AUDIT-C     1. How often do you have a drink containing alcohol? 0 Filed at: 04/04/2025 2241   2. How many drinks containing alcohol do you have on a typical day you are drinking?  0 Filed at: 04/04/2025 2241   3a. Male UNDER 65: How often do you have five or more drinks on one occasion? 0 Filed at: 04/04/2025 2241   3b. FEMALE Any Age, or MALE 65+: How often do you have 4 or more drinks on one occassion? 0 Filed at: 04/04/2025 2241   Audit-C Score 0 Filed at: 04/04/2025 2241   DUNCAN: How many times in the past year have you...    Used an illegal drug or used a prescription medication for non-medical reasons? Never Filed at:  04/04/2025 2241                            History of Present Illness       Chief Complaint   Patient presents with    Pain     Pt arrives via ems from [facility]. Pt reports generalized pain all over her body. Has chronic pain but today its worse and wanted to come get checked out.        Past Medical History:   Diagnosis Date    Acid reflux     Acute kidney injury (HCC) 06/18/2022    Anemia     hx of iron-deficient    Anxiety     Arthritis     Asthma     last needed inhaler last year 2020    Basal cell carcinoma     upper lip    Chronic narcotic dependence (HCC)     Chronic pain     Colitis 11/19/2024    Colon polyp     Cystocele     Diabetes mellitus (HCC)     stable    Disease of thyroid gland     hypothyroidism    Diverticulosis     Dizziness     at times    Dysfunctional uterine bleeding     last assessed - [id number]    Dysphagia     Fibromyalgia     Gastric ulcer     Gastroparesis     History of colonic polyps     last assessed - [id number]    History of gastroesophageal reflux (GERD)     Hypercholesterolemia     Hyperlipidemia     Hypertension     Hyponatremia 01/13/2024    IBS (irritable bowel syndrome)     Pneumobilia 06/18/2022    Post laminectomy syndrome     S/P insertion of spinal cord stimulator 07/18/2018    s/p Medtronic loop recorder 3/2/2024 03/02/2024    Seasonal allergies     Shortness of breath     exertional    Spinal stenosis     Status post lumbar spinal fusion 03/16/2018    Stroke ([facility])     pt states slight stroke March 2022      Past Surgical History:   Procedure Laterality Date    APPENDECTOMY      BACK SURGERY      BREAST CYST EXCISION Left     CARDIAC CATHETERIZATION  11/14/2023    Procedure: Cardiac catheterization;  Surgeon: Randolph Holt MD;  [facility];  Service: Cardiology    CARDIAC CATHETERIZATION N/A 11/14/2023    Procedure: Cardiac Coronary Angiogram;  Surgeon: Randolph Holt MD;  [facility];  Service: Cardiology    CARDIAC  ELECTROPHYSIOLOGY PROCEDURE N/A 3/2/2024    Procedure: Cardiac loop recorder implant;  Surgeon: Edu Fontaine MD;  [facility];  Service: Cardiology    CHOLECYSTECTOMY      COLONOSCOPY      ESOPHAGOGASTRODUODENOSCOPY N/A 09/28/2016    Procedure: ESOPHAGOGASTRODUODENOSCOPY (EGD);  Surgeon: Mylene Moeller MD;  Location: [facility];  Service:     HERNIA REPAIR      HYSTERECTOMY      TTAH-BSO age 28    LAMINECTOMY      LUMBAR LAMINECTOMY      OOPHORECTOMY      age 28    HI ARTHRODESIS POSTERIOR/PSTLAT TQ 1NTRSPC THORACIC N/A 06/04/2018    Procedure: Reopening of lumbar incision for T12-L5 posterior instrumented fixation and fusion and T12-L4 posterior decompression;  Surgeon: Wood Rogel MD;  Location: [facility];  Service: Neurosurgery    HI COLONOSCOPY FLX DX W/COLLJ SPEC WHEN PFRMD N/A 03/02/2016    Procedure: EGD AND COLONOSCOPY;  Surgeon: Mylene Moeller MD;  [facility]: [facility];  Service: Gastroenterology    HI DILATION ESOPH UNGUIDED SOUND/BOUGIE 1/MULT PASS N/A 09/28/2016    Procedure: DILATATION ESOPHAGEAL;  Surgeon: Mylene Moeller MD;  [facility]: [facility];  Service: Gastroenterology    HI ESOPHAGOGASTRODUODENOSCOPY TRANSORAL DIAGNOSTIC N/A 07/18/2016    Procedure: ESOPHAGOGASTRODUODENOSCOPY (EGD);  Surgeon: Mylene Moeller MD;  [facility]: [facility];  Service: Gastroenterology    HI INSJ/RPLCMT SPINAL NPG/RCVR POCKET CRTJ&CONNJ Left 06/04/2018    Procedure: removal of left buttock implantable pulse generator and placement of new  implantable pulse generator;  Surgeon: Wood Rogel MD;  Location: [facility];  Service: Neurosurgery      Family History   Problem Relation Age of Onset    Lung cancer Mother 46    Pulmonary embolism Father     No Known Problems Sister     No Known Problems Daughter     No Known Problems Daughter     Stroke Maternal Grandmother     Heart attack Maternal Grandfather     No Known Problems Paternal Grandmother     No Known Problems Paternal Grandfather     No Known  Problems Maternal Aunt     Diabetes Family         Diabetes mellitus    Hypertension Family     Stroke Family         Stroke complications      Social History     Tobacco Use    Smoking status: Former     Current packs/day: 0.00     Types: Cigarettes     Quit date: 1970     Years since quittin.2    Smokeless tobacco: Never    Tobacco comments:     Denied history of current ever day smoker, Former smoker and Never smoker all documented in [employer]   Vaping Use    Vaping status: Never Used   Substance Use Topics    Alcohol use: Not Currently     Comment: Denied history of alcohol use    Drug use: No     Comment: Denied history of drug use      E-Cigarette/Vaping    E-Cigarette Use Never User       E-Cigarette/Vaping Substances    Nicotine No     THC No     CBD No     Flavoring No     Other No     Unknown No       I have reviewed and agree with the history as documented.     Patient is an 80-year-old female with a significant past medical history of diabetes, she is of heart failure, chronic opioid dependence, presenting for evaluation of generalized pain.  Patient reports that she has been having some chronic generalized pain that has been progressing over the course of weeks to months.  She says that this is particularly worsened over the last few weeks.  She says that this seems to be generalized aches and pains throughout much of her body.  She has some pain when she lifts up her arms and when she moves her legs.  She has pain with moving around in the bed.  She endorses chest pain as well as generalized abdominal pain.  She does report that she was recently changed from oxycodone to morphine, but feels like this was long before her pain started intensifying.  She has seen a pain specialist, however says that this was years ago.  She is otherwise without specific complaint.        Review of Systems   Cardiovascular:  Positive for chest pain.   Gastrointestinal:  Positive for abdominal pain. Negative for  nausea and vomiting.   Musculoskeletal:  Positive for arthralgias and myalgias.           Objective       ED Triage Vitals [04/04/25 2119]   Temperature Pulse Blood Pressure Respirations SpO2 Patient Position - Orthostatic VS   99 °F (37.2 °C) 94 139/82 18 96 % --      Temp Source Heart Rate Source BP Location FiO2 (%) Pain Score    Oral Monitor -- -- (S) 10 - Worst Possible Pain      Vitals      Date and Time Temp Pulse SpO2 Resp BP Pain Score FACES Pain Rating User   04/05/25 0230 -- 89 96 % 20 134/58 -- -- BRB   04/05/25 0130 -- 88 95 % 16 117/56 2 -- BRB   04/05/25 0027 -- 92 96 % 18 122/57 -- -- CG   04/04/25 2240 -- -- -- -- -- 8 -- DW   04/04/25 2119 99 °F (37.2 °C) 94 96 % 18 139/82  10 - Worst Possible Pain -- CG            Physical Exam  Vitals and nursing note reviewed.   Constitutional:       General: She is not in acute distress.     Appearance: Normal appearance. She is not ill-appearing or toxic-appearing.   HENT:      Head: Normocephalic and atraumatic.      Right Ear: External ear normal.      Left Ear: External ear normal.      Nose: Nose normal.   Eyes:      General: No scleral icterus.        Right eye: No discharge.         Left eye: No discharge.      Extraocular Movements: Extraocular movements intact.      Conjunctiva/sclera: Conjunctivae normal.   Cardiovascular:      Rate and Rhythm: Normal rate.      Heart sounds: Normal heart sounds. No murmur heard.     No friction rub. No gallop.   Pulmonary:      Effort: Pulmonary effort is normal. No respiratory distress.      Breath sounds: Normal breath sounds.   Abdominal:      General: Abdomen is flat. There is no distension.      Palpations: Abdomen is soft. There is no mass.      Tenderness: There is generalized abdominal tenderness.   Genitourinary:     Comments: Deferred  Musculoskeletal:      Comments: Mild diffuse tenderness to palpation of the bilateral upper and lower extremities without skin changes, deformities, pain out of proportion.   Patient able to move her arms and legs up and down although does report worsening pain when doing so.   Skin:     General: Skin is warm and dry.   Neurological:      General: No focal deficit present.      Mental Status: She is alert.         Results Reviewed       Procedure Component Value Units Date/Time    HS Troponin I 2hr [[id number]]  (Normal) Collected: 04/05/25 0050    Lab Status: Final result Specimen: Blood from Arm, Right Updated: 04/05/25 0120     hs TnI 2hr 8 ng/L      Delta 2hr hsTnI 0 ng/L     HS Troponin 0hr (reflex protocol) [[id number]]  (Normal) Collected: 04/04/25 2241    Lab Status: Final result Specimen: Blood from Arm, Right Updated: 04/04/25 2315     hs TnI 0hr 8 ng/L     Lipase [[id number]]  (Normal) Collected: 04/04/25 2241    Lab Status: Final result Specimen: Blood from Arm, Right Updated: 04/04/25 2308     Lipase 22 u/L     Comprehensive metabolic panel [[id number]]  (Abnormal) Collected: 04/04/25 2241    Lab Status: Final result Specimen: Blood from Arm, Right Updated: 04/04/25 2308     Sodium 136 mmol/L      Potassium 4.0 mmol/L      Chloride 92 mmol/L      CO2 35 mmol/L      ANION GAP 9 mmol/L      BUN 22 mg/dL      Creatinine 1.55 mg/dL      Glucose 166 mg/dL      Calcium 9.5 mg/dL      AST 14 U/L      ALT 12 U/L      Alkaline Phosphatase 131 U/L      Total Protein 7.1 g/dL      Albumin 3.8 g/dL      Total Bilirubin 0.46 mg/dL      eGFR 30 ml/min/1.73sq m     Narrative:      National Kid[employer] Disease Foundation guidelines for Chronic Kidney Disease (CKD):     Stage 1 with normal or high GFR (GFR > 90 mL/min/1.73 square meters)    Stage 2 Mild CKD (GFR = 60-89 mL/min/1.73 square meters)    Stage 3A Moderate CKD (GFR = 45-59 mL/min/1.73 square meters)    Stage 3B Moderate CKD (GFR = 30-44 mL/min/1.73 square meters)    Stage 4 Severe CKD (GFR = 15-29 mL/min/1.73 square meters)    Stage 5 End Stage CKD (GFR <15 mL/min/1.73 square meters)  Note: GFR calculation is accurate only with a steady  state creatinine    CBC and differential [[id number]]  (Abnormal) Collected: 04/04/25 2241    Lab Status: Final result Specimen: Blood from Arm, Right Updated: 04/04/25 2253     WBC 9.12 Thousand/uL      RBC 3.84 Million/uL      Hemoglobin 10.6 g/dL      Hematocrit 34.1 %      MCV 89 fL      MCH 27.6 pg      MCHC 31.1 g/dL      RDW 14.8 %      MPV 10.6 fL      Platelets 245 Thousands/uL      nRBC 0 /100 WBCs      Segmented % 63 %      Immature Grans % 0 %      Lymphocytes % 20 %      Monocytes % 12 %      Eosinophils Relative 4 %      Basophils Relative 1 %      Absolute Neutrophils 5.76 Thousands/µL      Absolute Immature Grans 0.02 Thousand/uL      Absolute Lymphocytes 1.84 Thousands/µL      Absolute Monocytes 1.12 Thousand/µL      Eosinophils Absolute 0.33 Thousand/µL      Basophils Absolute 0.05 Thousands/µL             X-ray chest 1 view portable   ED Interpretation by Marlon Aguilar DO (04/04 2257)   No acute cardiopulmonary disease      CT abdomen pelvis wo contrast    (Results Pending)       Procedures    ED Medication and Procedure Management   Prior to Admission Medications   Prescriptions Last Dose Informant Patient Reported? Taking?   Blood Glucose Monitoring Suppl (OneTouch Verio Reflect) w/Device KIT  Self No No   Sig: Check blood sugars twice daily. Please substitute with appropriate alternative as covered by patient's insurance. Dx: E11.65   Lidocaine 4 % PTCH  Self Yes No   Sig: Apply 4 % topically Apply 1 every morning to back and remove at bedtime   MAGNESIUM OXIDE 400 PO  Self Yes No   Sig: Take 400 mg by mouth 2 (two) times a day   Milnacipran HCl (Savella) 100 MG TABS  Self No No   Sig: Take 1 tablet (100 mg total) by mouth daily   OneTouch Delica Lancets 33G MISC  Self No No   Sig: Check blood sugars twice daily. Please substitute with appropriate alternative as covered by patient's insurance. Dx: E11.65   acetaminophen (TYLENOL) 325 mg tablet  Self No No   Sig: Take 3 tablets (975 mg  total) by mouth every 8 (eight) hours   albuterol (PROVENTIL HFA,VENTOLIN HFA) 90 mcg/act inhaler  Self No No   Sig: Inhale 2 puffs every 6 (six) hours as needed for wheezing or shortness of breath   aspirin 81 mg chewable tablet  Self Yes No   Sig: Chew 81 mg daily   atorvastatin (LIPITOR) 40 mg tablet  Self No No   Sig: TAKE ONE TABLET BY MOUTH ONCE DAILY   docusate sodium (COLACE) 100 mg capsule  Self No No   Sig: Take 1 capsule (100 mg total) by mouth 2 (two) times a day   ergocalciferol (VITAMIN D2) 50,000 units  Self Yes No   Sig: Take 50,000 Units by mouth Take I cap orally once monthly   famotidine (PEPCID) 20 mg tablet  Self Yes No   Sig: Take 20 mg by mouth 2 (two) times a day 1 tab orally twice daily as needed for heartburn/acid reflux   fentaNYL (DURAGESIC) 75 mcg/hr  Self Yes No   Sig: Place 1 patch on the skin every third day Apply 1 patch every 3 days   ferrous sulfate 325 (65 Fe) mg tablet  Self No No   Sig: Take 1 tablet (325 mg total) by mouth daily with breakfast   gabapentin (NEURONTIN) 300 mg capsule   No No   Sig: Take 1 capsule (300 mg total) by mouth daily at bedtime   glucose blood (OneTouch Verio) test strip  Self No No   Sig: Check blood sugars twice daily. Please substitute with appropriate alternative as covered by patient's insurance. Dx: E11.65   latanoprost (XALATAN) 0.005 % ophthalmic solution  Self Yes No   Sig: Administer 1 drop to both eyes daily at bedtime   levothyroxine 75 mcg tablet  Self No No   Sig: Take 0.5 tablets (37.5 mcg total) by mouth daily in the early morning   lidocaine (LIDODERM) 5 %  Self No No   Sig: Apply 1 patch topically over 12 hours daily Remove & Discard patch within 12 hours or as directed by MD   loperamide (IMODIUM A-D) 2 MG tablet  Self Yes No   Sig: Take 2 mg by mouth 4 (four) times a day as needed for diarrhea   melatonin 5 MG TABS   No No   Sig: Take 1 tablet (5 mg total) by mouth daily at bedtime 1 tab orally at bedtime   metFORMIN (GLUCOPHAGE) 500  mg tablet   No No   Sig: Take 1 tablet (500 mg total) by mouth 2 (two) times a day with meals   methocarbamol (ROBAXIN) 750 mg tablet  Self Yes No   Sig: Take 750 mg by mouth every 8 (eight) hours 1 tab orally every 8 hours as needed for muscle spasms   metoprolol succinate (TOPROL-XL) 25 mg 24 hr tablet  Self No No   Sig: Take 0.5 tablets (12.5 mg total) by mouth daily   morphine (MSIR) 15 mg tablet   No No   Sig: Take 1 tablet (15 mg total) by mouth every 6 (six) hours Max Daily Amount: 60 mg   ondansetron (ZOFRAN) 4 mg tablet  Self No No   Sig: Take 1 tablet (4 mg total) by mouth every 8 (eight) hours as needed for nausea or vomiting   oxymetazoline (AFRIN) 0.05 % nasal spray   No No   Si sprays by Each Nare route every 12 (twelve) hours as needed for congestion for up to 3 days   polyethylene glycol (MIRALAX) 17 g packet  Self No No   Sig: Take 17 g by mouth daily   senna (SENOKOT) 8.6 mg   No No   Sig: Take 2 tablets (17.2 mg total) by mouth every morning   simethicone (MYLICON) 80 mg chewable tablet  Self Yes No   Sig: Chew 80 mg every 6 (six) hours as needed for flatulence Chew and swallow 1 tab orally 3 times daily as needed for gas   sucralfate (CARAFATE) 1 g tablet   No No   Sig: Take 1 tablet (1 g total) by mouth 2 (two) times a day before meals for 7 days   torsemide (DEMADEX) 10 mg tablet  Self No No   Sig: Take 3 tablets (30 mg total) by mouth 2 (two) times a day   torsemide (DEMADEX) 20 mg tablet   Yes No   Sig: Take 20 mg by mouth if needed (leg swelling)   trospium chloride (SANCTURA) 20 mg tablet   No No   Sig: Take 1 tablet (20 mg total) by mouth 2 (two) times a day   zolpidem (AMBIEN CR) 6.25 MG CR tablet  Self Yes No   Sig: Take 6.25 mg by mouth daily at bedtime as needed for sleep      Facility-Administered Medications: None     Discharge Medication List as of 2025  1:53 AM        CONTINUE these medications which have NOT CHANGED    Details   acetaminophen (TYLENOL) 325 mg tablet Take 3  tablets (975 mg total) by mouth every 8 (eight) hours, Starting Sat 10/19/2024, No Print      albuterol (PROVENTIL HFA,VENTOLIN HFA) 90 mcg/act inhaler Inhale 2 puffs every 6 (six) hours as needed for wheezing or shortness of breath, Starting Wed 6/29/2022, Normal      aspirin 81 mg chewable tablet Chew 81 mg daily, Historical Med      atorvastatin (LIPITOR) 40 mg tablet TAKE ONE TABLET BY MOUTH ONCE DAILY, Normal      Blood Glucose Monitoring Suppl (OneTouch Verio Reflect) w/Device KIT Check blood sugars twice daily. Please substitute with appropriate alternative as covered by patient's insurance. Dx: E11.65, Normal      docusate sodium (COLACE) 100 mg capsule Take 1 capsule (100 mg total) by mouth 2 (two) times a day, Starting Wed 6/5/2024, No Print      ergocalciferol (VITAMIN D2) 50,000 units Take 50,000 Units by mouth Take I cap orally once monthly, Historical Med      famotidine (PEPCID) 20 mg tablet Take 20 mg by mouth 2 (two) times a day 1 tab orally twice daily as needed for heartburn/acid reflux, Historical Med      fentaNYL (DURAGESIC) 75 mcg/hr Place 1 patch on the skin every third day Apply 1 patch every 3 days, Historical Med      ferrous sulfate 325 (65 Fe) mg tablet Take 1 tablet (325 mg total) by mouth daily with breakfast, Starting Wed 1/10/2024, Print      gabapentin (NEURONTIN) 300 mg capsule Take 1 capsule (300 mg total) by mouth daily at bedtime, Starting Thu 2/13/2025, No Print      glucose blood (OneTouch Verio) test strip Check blood sugars twice daily. Please substitute with appropriate alternative as covered by patient's insurance. Dx: E11.65, Normal      latanoprost (XALATAN) 0.005 % ophthalmic solution Administer 1 drop to both eyes daily at bedtime, Historical Med      levothyroxine 75 mcg tablet Take 0.5 tablets (37.5 mcg total) by mouth daily in the early morning, Starting Wed 1/17/2024, Until Thu 2/13/2025, No Print      lidocaine (LIDODERM) 5 % Apply 1 patch topically over 12 hours  daily Remove & Discard patch within 12 hours or as directed by MD, Starting Sun 10/20/2024, No Print      Lidocaine 4 % PTCH Apply 4 % topically Apply 1 every morning to back and remove at bedtime, Historical Med      loperamide (IMODIUM A-D) 2 MG tablet Take 2 mg by mouth 4 (four) times a day as needed for diarrhea, Historical Med      MAGNESIUM OXIDE 400 PO Take 400 mg by mouth 2 (two) times a day, Historical Med      melatonin 5 MG TABS Take 1 tablet (5 mg total) by mouth daily at bedtime 1 tab orally at bedtime, Starting Thu 2/13/2025, No Print      metFORMIN (GLUCOPHAGE) 500 mg tablet Take 1 tablet (500 mg total) by mouth 2 (two) times a day with meals, Starting Thu 2/13/2025, No Print      methocarbamol (ROBAXIN) 750 mg tablet Take 750 mg by mouth every 8 (eight) hours 1 tab orally every 8 hours as needed for muscle spasms, Historical Med      metoprolol succinate (TOPROL-XL) 25 mg 24 hr tablet Take 0.5 tablets (12.5 mg total) by mouth daily, Starting Tue 12/3/2024, No Print      Milnacipran HCl (Savella) 100 MG TABS Take 1 tablet (100 mg total) by mouth daily, Starting Sun 3/5/2023, Normal      morphine (MSIR) 15 mg tablet Take 1 tablet (15 mg total) by mouth every 6 (six) hours Max Daily Amount: 60 mg, Starting Thu 2/13/2025, Fill Later      ondansetron (ZOFRAN) 4 mg tablet Take 1 tablet (4 mg total) by mouth every 8 (eight) hours as needed for nausea or vomiting, Starting Thu 1/23/2025, No Print      OneTouch Delica Lancets 33G MISC Check blood sugars twice daily. Please substitute with appropriate alternative as covered by patient's insurance. Dx: E11.65, Normal      oxymetazoline (AFRIN) 0.05 % nasal spray 2 sprays by Each Nare route every 12 (twelve) hours as needed for congestion for up to 3 days, Starting Thu 1/23/2025, Until Thu 2/13/2025 at 2359, No Print      polyethylene glycol (MIRALAX) 17 g packet Take 17 g by mouth daily, Starting Wed 6/5/2024, No Print      senna (SENOKOT) 8.6 mg Take 2 tablets  (17.2 mg total) by mouth every morning, Starting Thu 2/13/2025, No Print      simethicone (MYLICON) 80 mg chewable tablet Chew 80 mg every 6 (six) hours as needed for flatulence Chew and swallow 1 tab orally 3 times daily as needed for gas, Historical Med      sucralfate (CARAFATE) 1 g tablet Take 1 tablet (1 g total) by mouth 2 (two) times a day before meals for 7 days, Starting Sat 10/19/2024, Until Thu 2/13/2025, No Print      !! torsemide (DEMADEX) 10 mg tablet Take 3 tablets (30 mg total) by mouth 2 (two) times a day, Starting Mon 12/2/2024, No Print      !! torsemide (DEMADEX) 20 mg tablet Take 20 mg by mouth if needed (leg swelling), Historical Med      trospium chloride (SANCTURA) 20 mg tablet Take 1 tablet (20 mg total) by mouth 2 (two) times a day, Starting Wed 2/12/2025, Normal      zolpidem (AMBIEN CR) 6.25 MG CR tablet Take 6.25 mg by mouth daily at bedtime as needed for sleep, Historical Med       !! - Potential duplicate medications found. Please discuss with provider.          ED SEPSIS DOCUMENTATION   Time reflects when diagnosis was documented in both MDM as applicable and the Disposition within this note       Time User Action Codes Description Comment    4/5/2025  1:52 AM Marlon Aguilar [G89.29] Chronic pain                  Marlon Aguilar, DO  04/05/25 0955

## 2025-04-05 NOTE — DISCHARGE INSTRUCTIONS
Your evaluation suggests that your symptoms are due to a non emergent cause.    Please follow up with your primary care physician within two days. Please also follow up with the pain doctor I referred you to.    Return to the Emergency Department if you experience worsening or concerning symptoms.    Thank you for choosing us for your care.

## 2025-04-11 ENCOUNTER — OFFICE VISIT (OUTPATIENT)
Dept: CARDIOLOGY CLINIC | Facility: CLINIC | Age: 80
End: 2025-04-11
Payer: MEDICARE

## 2025-04-11 VITALS
DIASTOLIC BLOOD PRESSURE: 64 MMHG | WEIGHT: 176 LBS | HEART RATE: 84 BPM | SYSTOLIC BLOOD PRESSURE: 138 MMHG | BODY MASS INDEX: 34.37 KG/M2

## 2025-04-11 DIAGNOSIS — I50.9 CHF EXACERBATION (HCC): ICD-10-CM

## 2025-04-11 DIAGNOSIS — I10 HYPERTENSION: ICD-10-CM

## 2025-04-11 DIAGNOSIS — I25.10 CAD (CORONARY ARTERY DISEASE): ICD-10-CM

## 2025-04-11 DIAGNOSIS — I50.32 CHRONIC DIASTOLIC CONGESTIVE HEART FAILURE (HCC): Primary | ICD-10-CM

## 2025-04-11 DIAGNOSIS — I63.9 CEREBROVASCULAR ACCIDENT (CVA), UNSPECIFIED MECHANISM (HCC): ICD-10-CM

## 2025-04-11 DIAGNOSIS — I50.32 CHRONIC HEART FAILURE WITH PRESERVED EJECTION FRACTION (HFPEF) (HCC): ICD-10-CM

## 2025-04-11 PROCEDURE — G2211 COMPLEX E/M VISIT ADD ON: HCPCS | Performed by: PHYSICIAN ASSISTANT

## 2025-04-11 PROCEDURE — 99214 OFFICE O/P EST MOD 30 MIN: CPT | Performed by: PHYSICIAN ASSISTANT

## 2025-04-11 RX ORDER — TORSEMIDE 10 MG/1
TABLET ORAL
Status: SHIPPED
Start: 2025-04-11

## 2025-04-11 RX ORDER — POTASSIUM CHLORIDE 1500 MG/1
20 TABLET, EXTENDED RELEASE ORAL DAILY
Qty: 5 TABLET | Refills: 0 | Status: SHIPPED | OUTPATIENT
Start: 2025-04-11

## 2025-04-22 ENCOUNTER — APPOINTMENT (EMERGENCY)
Dept: CT IMAGING | Facility: HOSPITAL | Age: 80
End: 2025-04-22
Payer: MEDICARE

## 2025-04-22 ENCOUNTER — APPOINTMENT (EMERGENCY)
Dept: RADIOLOGY | Facility: HOSPITAL | Age: 80
End: 2025-04-22
Payer: MEDICARE

## 2025-04-22 ENCOUNTER — HOSPITAL ENCOUNTER (INPATIENT)
Facility: HOSPITAL | Age: 80
LOS: 3 days | Discharge: HOME | End: 2025-04-25
Attending: EMERGENCY MEDICINE | Admitting: INTERNAL MEDICINE
Payer: MEDICARE

## 2025-04-22 DIAGNOSIS — K31.1 GASTRIC OUTLET OBSTRUCTION: Primary | ICD-10-CM

## 2025-04-22 DIAGNOSIS — T83.9XXA PROBLEM WITH FOLEY CATHETER, INITIAL ENCOUNTER (HCC): ICD-10-CM

## 2025-04-22 DIAGNOSIS — B37.2 CUTANEOUS CANDIDIASIS: ICD-10-CM

## 2025-04-22 LAB
ALBUMIN SERPL BCG-MCNC: 3.7 G/DL (ref 3.5–5)
ALP SERPL-CCNC: 132 U/L (ref 34–104)
ALT SERPL W P-5'-P-CCNC: 11 U/L (ref 7–52)
ANION GAP SERPL CALCULATED.3IONS-SCNC: 7 MMOL/L (ref 4–13)
AST SERPL W P-5'-P-CCNC: 28 U/L (ref 13–39)
BASOPHILS # BLD AUTO: 0.05 THOUSANDS/ÂΜL (ref 0–0.1)
BASOPHILS NFR BLD AUTO: 1 % (ref 0–1)
BILIRUB SERPL-MCNC: 0.31 MG/DL (ref 0.2–1)
BUN SERPL-MCNC: 19 MG/DL (ref 5–25)
CALCIUM SERPL-MCNC: 9 MG/DL (ref 8.4–10.2)
CHLORIDE SERPL-SCNC: 95 MMOL/L (ref 96–108)
CO2 SERPL-SCNC: 34 MMOL/L (ref 21–32)
CREAT SERPL-MCNC: 1.71 MG/DL (ref 0.6–1.3)
EOSINOPHIL # BLD AUTO: 0.37 THOUSAND/ÂΜL (ref 0–0.61)
EOSINOPHIL NFR BLD AUTO: 5 % (ref 0–6)
ERYTHROCYTE [DISTWIDTH] IN BLOOD BY AUTOMATED COUNT: 14.7 % (ref 11.6–15.1)
GFR SERPL CREATININE-BSD FRML MDRD: 27 ML/MIN/1.73SQ M
GLUCOSE SERPL-MCNC: 168 MG/DL (ref 65–140)
HCT VFR BLD AUTO: 33.5 % (ref 34.8–46.1)
HGB BLD-MCNC: 10.6 G/DL (ref 11.5–15.4)
IMM GRANULOCYTES # BLD AUTO: 0.02 THOUSAND/UL (ref 0–0.2)
IMM GRANULOCYTES NFR BLD AUTO: 0 % (ref 0–2)
LYMPHOCYTES # BLD AUTO: 1.91 THOUSANDS/ÂΜL (ref 0.6–4.47)
LYMPHOCYTES NFR BLD AUTO: 25 % (ref 14–44)
MCH RBC QN AUTO: 28.1 PG (ref 26.8–34.3)
MCHC RBC AUTO-ENTMCNC: 31.6 G/DL (ref 31.4–37.4)
MCV RBC AUTO: 89 FL (ref 82–98)
MONOCYTES # BLD AUTO: 0.74 THOUSAND/ÂΜL (ref 0.17–1.22)
MONOCYTES NFR BLD AUTO: 10 % (ref 4–12)
NEUTROPHILS # BLD AUTO: 4.7 THOUSANDS/ÂΜL (ref 1.85–7.62)
NEUTS SEG NFR BLD AUTO: 59 % (ref 43–75)
NRBC BLD AUTO-RTO: 0 /100 WBCS
PLATELET # BLD AUTO: 284 THOUSANDS/UL (ref 149–390)
PMV BLD AUTO: 9.8 FL (ref 8.9–12.7)
POTASSIUM SERPL-SCNC: 5 MMOL/L (ref 3.5–5.3)
PROT SERPL-MCNC: 6.8 G/DL (ref 6.4–8.4)
RBC # BLD AUTO: 3.77 MILLION/UL (ref 3.81–5.12)
SODIUM SERPL-SCNC: 136 MMOL/L (ref 135–147)
WBC # BLD AUTO: 7.79 THOUSAND/UL (ref 4.31–10.16)

## 2025-04-22 PROCEDURE — 71045 X-RAY EXAM CHEST 1 VIEW: CPT

## 2025-04-22 PROCEDURE — 80053 COMPREHEN METABOLIC PANEL: CPT | Performed by: PHYSICIAN ASSISTANT

## 2025-04-22 PROCEDURE — 36415 COLL VENOUS BLD VENIPUNCTURE: CPT | Performed by: PHYSICIAN ASSISTANT

## 2025-04-22 PROCEDURE — 99285 EMERGENCY DEPT VISIT HI MDM: CPT | Performed by: PHYSICIAN ASSISTANT

## 2025-04-22 PROCEDURE — 74176 CT ABD & PELVIS W/O CONTRAST: CPT

## 2025-04-22 PROCEDURE — 85025 COMPLETE CBC W/AUTO DIFF WBC: CPT | Performed by: PHYSICIAN ASSISTANT

## 2025-04-22 PROCEDURE — 99284 EMERGENCY DEPT VISIT MOD MDM: CPT

## 2025-04-22 PROCEDURE — 99222 1ST HOSP IP/OBS MODERATE 55: CPT | Performed by: SURGERY

## 2025-04-22 PROCEDURE — 96374 THER/PROPH/DIAG INJ IV PUSH: CPT

## 2025-04-22 RX ORDER — DIAZEPAM 10 MG/2ML
2.5 INJECTION, SOLUTION INTRAMUSCULAR; INTRAVENOUS ONCE
Status: COMPLETED | OUTPATIENT
Start: 2025-04-22 | End: 2025-04-22

## 2025-04-22 RX ORDER — CHOLECALCIFEROL (VITAMIN D3) 1250 MCG
50000 CAPSULE ORAL DAILY
Status: ON HOLD | COMMUNITY

## 2025-04-22 RX ADMIN — DIAZEPAM 2.5 MG: 10 INJECTION, SOLUTION INTRAMUSCULAR; INTRAVENOUS at 23:22

## 2025-04-23 ENCOUNTER — APPOINTMENT (INPATIENT)
Dept: RADIOLOGY | Facility: HOSPITAL | Age: 80
End: 2025-04-23
Payer: MEDICARE

## 2025-04-23 PROBLEM — R41.0 CONFUSION: Status: RESOLVED | Noted: 2023-02-23 | Resolved: 2025-04-23

## 2025-04-23 PROBLEM — R09.81 CONGESTION OF NASAL SINUS: Status: RESOLVED | Noted: 2025-01-23 | Resolved: 2025-04-23

## 2025-04-23 PROBLEM — R32 URINARY INCONTINENCE: Status: RESOLVED | Noted: 2025-02-12 | Resolved: 2025-04-23

## 2025-04-23 PROBLEM — J96.01 ACUTE RESPIRATORY FAILURE WITH HYPOXIA (HCC): Status: RESOLVED | Noted: 2023-11-10 | Resolved: 2025-04-23

## 2025-04-23 PROBLEM — H40.9 GLAUCOMA: Status: RESOLVED | Noted: 2017-01-06 | Resolved: 2025-04-23

## 2025-04-23 PROBLEM — Z78.9: Status: RESOLVED | Noted: 2025-02-13 | Resolved: 2025-04-23

## 2025-04-23 PROBLEM — R33.9 URINARY RETENTION: Status: RESOLVED | Noted: 2023-11-29 | Resolved: 2025-04-23

## 2025-04-23 PROBLEM — N18.32 STAGE 3B CHRONIC KIDNEY DISEASE (HCC): Status: RESOLVED | Noted: 2024-11-25 | Resolved: 2025-04-23

## 2025-04-23 PROBLEM — G47.00 INSOMNIA: Status: RESOLVED | Noted: 2024-11-19 | Resolved: 2025-04-23

## 2025-04-23 PROBLEM — S82.009A PATELLA FRACTURE: Status: RESOLVED | Noted: 2024-11-17 | Resolved: 2025-04-23

## 2025-04-23 LAB
ANION GAP SERPL CALCULATED.3IONS-SCNC: 7 MMOL/L (ref 4–13)
BUN SERPL-MCNC: 18 MG/DL (ref 5–25)
CALCIUM SERPL-MCNC: 8.6 MG/DL (ref 8.4–10.2)
CHLORIDE SERPL-SCNC: 98 MMOL/L (ref 96–108)
CO2 SERPL-SCNC: 34 MMOL/L (ref 21–32)
CREAT SERPL-MCNC: 1.39 MG/DL (ref 0.6–1.3)
ERYTHROCYTE [DISTWIDTH] IN BLOOD BY AUTOMATED COUNT: 14.7 % (ref 11.6–15.1)
EST. AVERAGE GLUCOSE BLD GHB EST-MCNC: 166 MG/DL
GFR SERPL CREATININE-BSD FRML MDRD: 35 ML/MIN/1.73SQ M
GLUCOSE SERPL-MCNC: 129 MG/DL (ref 65–140)
GLUCOSE SERPL-MCNC: 138 MG/DL (ref 65–140)
GLUCOSE SERPL-MCNC: 139 MG/DL (ref 65–140)
GLUCOSE SERPL-MCNC: 144 MG/DL (ref 65–140)
GLUCOSE SERPL-MCNC: 37 MG/DL (ref 65–140)
HBA1C MFR BLD: 7.4 %
HCT VFR BLD AUTO: 31.7 % (ref 34.8–46.1)
HGB BLD-MCNC: 9.7 G/DL (ref 11.5–15.4)
MCH RBC QN AUTO: 27.5 PG (ref 26.8–34.3)
MCHC RBC AUTO-ENTMCNC: 30.6 G/DL (ref 31.4–37.4)
MCV RBC AUTO: 90 FL (ref 82–98)
PLATELET # BLD AUTO: 279 THOUSANDS/UL (ref 149–390)
PMV BLD AUTO: 10.3 FL (ref 8.9–12.7)
POTASSIUM SERPL-SCNC: 3.8 MMOL/L (ref 3.5–5.3)
RBC # BLD AUTO: 3.53 MILLION/UL (ref 3.81–5.12)
SODIUM SERPL-SCNC: 139 MMOL/L (ref 135–147)
WBC # BLD AUTO: 6.88 THOUSAND/UL (ref 4.31–10.16)

## 2025-04-23 PROCEDURE — 83036 HEMOGLOBIN GLYCOSYLATED A1C: CPT

## 2025-04-23 PROCEDURE — 99222 1ST HOSP IP/OBS MODERATE 55: CPT | Performed by: INTERNAL MEDICINE

## 2025-04-23 PROCEDURE — 99232 SBSQ HOSP IP/OBS MODERATE 35: CPT | Performed by: STUDENT IN AN ORGANIZED HEALTH CARE EDUCATION/TRAINING PROGRAM

## 2025-04-23 PROCEDURE — 82948 REAGENT STRIP/BLOOD GLUCOSE: CPT

## 2025-04-23 PROCEDURE — 99223 1ST HOSP IP/OBS HIGH 75: CPT | Performed by: INTERNAL MEDICINE

## 2025-04-23 PROCEDURE — 85027 COMPLETE CBC AUTOMATED: CPT

## 2025-04-23 PROCEDURE — 80048 BASIC METABOLIC PNL TOTAL CA: CPT

## 2025-04-23 RX ORDER — ASPIRIN 81 MG/1
81 TABLET, CHEWABLE ORAL DAILY
Status: DISCONTINUED | OUTPATIENT
Start: 2025-04-23 | End: 2025-04-25 | Stop reason: HOSPADM

## 2025-04-23 RX ORDER — PANTOPRAZOLE SODIUM 40 MG/1
40 TABLET, DELAYED RELEASE ORAL
Status: DISCONTINUED | OUTPATIENT
Start: 2025-04-23 | End: 2025-04-25 | Stop reason: HOSPADM

## 2025-04-23 RX ORDER — HYDROMORPHONE HCL/PF 1 MG/ML
0.5 SYRINGE (ML) INJECTION EVERY 4 HOURS PRN
Status: DISCONTINUED | OUTPATIENT
Start: 2025-04-23 | End: 2025-04-24

## 2025-04-23 RX ORDER — ZOLPIDEM TARTRATE 5 MG/1
5 TABLET ORAL
Status: DISCONTINUED | OUTPATIENT
Start: 2025-04-23 | End: 2025-04-24

## 2025-04-23 RX ORDER — METOPROLOL SUCCINATE 25 MG/1
12.5 TABLET, EXTENDED RELEASE ORAL DAILY
Status: DISCONTINUED | OUTPATIENT
Start: 2025-04-23 | End: 2025-04-25 | Stop reason: HOSPADM

## 2025-04-23 RX ORDER — ACETAMINOPHEN 10 MG/ML
1000 INJECTION, SOLUTION INTRAVENOUS EVERY 6 HOURS PRN
Status: DISCONTINUED | OUTPATIENT
Start: 2025-04-24 | End: 2025-04-24

## 2025-04-23 RX ORDER — LATANOPROST 50 UG/ML
1 SOLUTION/ DROPS OPHTHALMIC
Status: DISCONTINUED | OUTPATIENT
Start: 2025-04-23 | End: 2025-04-25 | Stop reason: HOSPADM

## 2025-04-23 RX ORDER — LEVOTHYROXINE SODIUM 75 UG/1
37.5 TABLET ORAL
Status: DISCONTINUED | OUTPATIENT
Start: 2025-04-23 | End: 2025-04-25 | Stop reason: HOSPADM

## 2025-04-23 RX ORDER — HYDROMORPHONE HCL IN WATER/PF 6 MG/30 ML
0.2 PATIENT CONTROLLED ANALGESIA SYRINGE INTRAVENOUS EVERY 4 HOURS PRN
Status: DISCONTINUED | OUTPATIENT
Start: 2025-04-23 | End: 2025-04-24

## 2025-04-23 RX ORDER — INSULIN LISPRO 100 [IU]/ML
1-6 INJECTION, SOLUTION INTRAVENOUS; SUBCUTANEOUS EVERY 6 HOURS
Status: DISCONTINUED | OUTPATIENT
Start: 2025-04-23 | End: 2025-04-24

## 2025-04-23 RX ORDER — SENNOSIDES 8.6 MG
1 TABLET ORAL 2 TIMES DAILY
Status: DISCONTINUED | OUTPATIENT
Start: 2025-04-23 | End: 2025-04-25 | Stop reason: HOSPADM

## 2025-04-23 RX ORDER — ONDANSETRON 2 MG/ML
4 INJECTION INTRAMUSCULAR; INTRAVENOUS EVERY 6 HOURS PRN
Status: DISCONTINUED | OUTPATIENT
Start: 2025-04-23 | End: 2025-04-25 | Stop reason: HOSPADM

## 2025-04-23 RX ORDER — DEXTROSE, SODIUM CHLORIDE, SODIUM LACTATE, POTASSIUM CHLORIDE, AND CALCIUM CHLORIDE 5; .6; .31; .03; .02 G/100ML; G/100ML; G/100ML; G/100ML; G/100ML
75 INJECTION, SOLUTION INTRAVENOUS CONTINUOUS
Status: DISCONTINUED | OUTPATIENT
Start: 2025-04-24 | End: 2025-04-24

## 2025-04-23 RX ORDER — DEXTROSE MONOHYDRATE 25 G/50ML
INJECTION, SOLUTION INTRAVENOUS
Status: COMPLETED
Start: 2025-04-23 | End: 2025-04-24

## 2025-04-23 RX ORDER — ACETAMINOPHEN 325 MG/1
975 TABLET ORAL EVERY 6 HOURS PRN
Status: DISCONTINUED | OUTPATIENT
Start: 2025-04-23 | End: 2025-04-25 | Stop reason: HOSPADM

## 2025-04-23 RX ORDER — FENTANYL 75 UG/1
1 PATCH TRANSDERMAL
Status: DISCONTINUED | OUTPATIENT
Start: 2025-04-23 | End: 2025-04-25 | Stop reason: HOSPADM

## 2025-04-23 RX ORDER — HEPARIN SODIUM 5000 [USP'U]/ML
5000 INJECTION, SOLUTION INTRAVENOUS; SUBCUTANEOUS EVERY 8 HOURS SCHEDULED
Status: DISCONTINUED | OUTPATIENT
Start: 2025-04-23 | End: 2025-04-25 | Stop reason: HOSPADM

## 2025-04-23 RX ORDER — GABAPENTIN 300 MG/1
300 CAPSULE ORAL
Status: DISCONTINUED | OUTPATIENT
Start: 2025-04-23 | End: 2025-04-25 | Stop reason: HOSPADM

## 2025-04-23 RX ORDER — OXYBUTYNIN CHLORIDE 5 MG/1
5 TABLET ORAL 2 TIMES DAILY
Status: DISCONTINUED | OUTPATIENT
Start: 2025-04-23 | End: 2025-04-25 | Stop reason: HOSPADM

## 2025-04-23 RX ORDER — MORPHINE SULFATE 15 MG/1
15 TABLET ORAL EVERY 6 HOURS
Status: DISCONTINUED | OUTPATIENT
Start: 2025-04-23 | End: 2025-04-25 | Stop reason: HOSPADM

## 2025-04-23 RX ORDER — ACETAMINOPHEN 10 MG/ML
1000 INJECTION, SOLUTION INTRAVENOUS EVERY 6 HOURS PRN
Status: DISCONTINUED | OUTPATIENT
Start: 2025-04-23 | End: 2025-04-23

## 2025-04-23 RX ORDER — MAGNESIUM HYDROXIDE/ALUMINUM HYDROXICE/SIMETHICONE 120; 1200; 1200 MG/30ML; MG/30ML; MG/30ML
30 SUSPENSION ORAL EVERY 6 HOURS PRN
Status: DISCONTINUED | OUTPATIENT
Start: 2025-04-23 | End: 2025-04-24

## 2025-04-23 RX ORDER — SODIUM CHLORIDE, SODIUM LACTATE, POTASSIUM CHLORIDE, CALCIUM CHLORIDE 600; 310; 30; 20 MG/100ML; MG/100ML; MG/100ML; MG/100ML
100 INJECTION, SOLUTION INTRAVENOUS CONTINUOUS
Status: DISCONTINUED | OUTPATIENT
Start: 2025-04-23 | End: 2025-04-23

## 2025-04-23 RX ORDER — ACETAMINOPHEN 325 MG/1
975 TABLET ORAL EVERY 6 HOURS PRN
Status: DISCONTINUED | OUTPATIENT
Start: 2025-04-23 | End: 2025-04-23

## 2025-04-23 RX ORDER — ATORVASTATIN CALCIUM 40 MG/1
40 TABLET, FILM COATED ORAL DAILY
Status: DISCONTINUED | OUTPATIENT
Start: 2025-04-23 | End: 2025-04-25 | Stop reason: HOSPADM

## 2025-04-23 RX ORDER — POLYETHYLENE GLYCOL 3350 17 G/17G
17 POWDER, FOR SOLUTION ORAL 2 TIMES DAILY
Status: DISCONTINUED | OUTPATIENT
Start: 2025-04-23 | End: 2025-04-25 | Stop reason: HOSPADM

## 2025-04-23 RX ORDER — NYSTATIN 100000 [USP'U]/G
POWDER TOPICAL 2 TIMES DAILY
Status: DISCONTINUED | OUTPATIENT
Start: 2025-04-23 | End: 2025-04-25 | Stop reason: HOSPADM

## 2025-04-23 RX ORDER — SODIUM CHLORIDE, SODIUM GLUCONATE, SODIUM ACETATE, POTASSIUM CHLORIDE, MAGNESIUM CHLORIDE, SODIUM PHOSPHATE, DIBASIC, AND POTASSIUM PHOSPHATE .53; .5; .37; .037; .03; .012; .00082 G/100ML; G/100ML; G/100ML; G/100ML; G/100ML; G/100ML; G/100ML
100 INJECTION, SOLUTION INTRAVENOUS CONTINUOUS
Status: DISCONTINUED | OUTPATIENT
Start: 2025-04-23 | End: 2025-04-23

## 2025-04-23 RX ADMIN — HEPARIN SODIUM 5000 UNITS: 5000 INJECTION INTRAVENOUS; SUBCUTANEOUS at 02:17

## 2025-04-23 RX ADMIN — FENTANYL 1 PATCH: 75 PATCH TRANSDERMAL at 02:17

## 2025-04-23 RX ADMIN — PANTOPRAZOLE SODIUM 40 MG: 40 TABLET, DELAYED RELEASE ORAL at 12:53

## 2025-04-23 RX ADMIN — LATANOPROST 1 DROP: 50 SOLUTION OPHTHALMIC at 21:23

## 2025-04-23 RX ADMIN — HYDROMORPHONE HYDROCHLORIDE 0.5 MG: 1 INJECTION, SOLUTION INTRAMUSCULAR; INTRAVENOUS; SUBCUTANEOUS at 08:26

## 2025-04-23 RX ADMIN — SODIUM CHLORIDE, SODIUM LACTATE, POTASSIUM CHLORIDE, AND CALCIUM CHLORIDE 100 ML/HR: .6; .31; .03; .02 INJECTION, SOLUTION INTRAVENOUS at 20:05

## 2025-04-23 RX ADMIN — SODIUM CHLORIDE, SODIUM LACTATE, POTASSIUM CHLORIDE, AND CALCIUM CHLORIDE 100 ML/HR: .6; .31; .03; .02 INJECTION, SOLUTION INTRAVENOUS at 11:46

## 2025-04-23 RX ADMIN — NYSTATIN: 100000 POWDER TOPICAL at 11:50

## 2025-04-23 RX ADMIN — HEPARIN SODIUM 5000 UNITS: 5000 INJECTION INTRAVENOUS; SUBCUTANEOUS at 05:01

## 2025-04-23 RX ADMIN — HYDROMORPHONE HYDROCHLORIDE 0.5 MG: 1 INJECTION, SOLUTION INTRAMUSCULAR; INTRAVENOUS; SUBCUTANEOUS at 20:04

## 2025-04-23 RX ADMIN — SODIUM CHLORIDE, SODIUM GLUCONATE, SODIUM ACETATE, POTASSIUM CHLORIDE, MAGNESIUM CHLORIDE, SODIUM PHOSPHATE, DIBASIC, AND POTASSIUM PHOSPHATE 100 ML/HR: .53; .5; .37; .037; .03; .012; .00082 INJECTION, SOLUTION INTRAVENOUS at 01:34

## 2025-04-23 RX ADMIN — SENNOSIDES 8.6 MG: 8.6 TABLET, FILM COATED ORAL at 11:51

## 2025-04-23 RX ADMIN — HYDROMORPHONE HYDROCHLORIDE 0.5 MG: 1 INJECTION, SOLUTION INTRAMUSCULAR; INTRAVENOUS; SUBCUTANEOUS at 02:17

## 2025-04-23 RX ADMIN — HEPARIN SODIUM 5000 UNITS: 5000 INJECTION INTRAVENOUS; SUBCUTANEOUS at 21:23

## 2025-04-23 RX ADMIN — HYDROMORPHONE HYDROCHLORIDE 0.5 MG: 1 INJECTION, SOLUTION INTRAMUSCULAR; INTRAVENOUS; SUBCUTANEOUS at 15:42

## 2025-04-23 RX ADMIN — NYSTATIN: 100000 POWDER TOPICAL at 18:10

## 2025-04-23 RX ADMIN — HEPARIN SODIUM 5000 UNITS: 5000 INJECTION INTRAVENOUS; SUBCUTANEOUS at 12:55

## 2025-04-23 RX ADMIN — TOPICAL ANESTHETIC 2 SPRAY: 200 SPRAY DENTAL; PERIODONTAL at 05:01

## 2025-04-23 RX ADMIN — LATANOPROST 1 DROP: 50 SOLUTION OPHTHALMIC at 05:01

## 2025-04-23 RX ADMIN — SENNOSIDES 8.6 MG: 8.6 TABLET, FILM COATED ORAL at 18:09

## 2025-04-23 RX ADMIN — ACETAMINOPHEN 1000 MG: 10 INJECTION INTRAVENOUS at 18:10

## 2025-04-23 NOTE — CONSULTS
Consultation - Gastroenterology   Name: Mattie Varela 80 y.o. female I MRN: [id number]  Unit/Bed#: [place] [address] -01 I Date of Admission: 4/22/2025   Date of Service: 4/23/2025 I Hospital Day: 1       Inpatient consult to gastroenterology     Date/Time  4/23/2025 8:00 AM     Performed by  Torey Childs MD   Authorized by  Naveed Goodson MD           Physician Requesting Evaluation: Khurram Ceron MD   Reason for Evaluation / Principal Problem: Gastric outlet obstruction    Assessment & Plan  Gastric outlet obstruction  80 y.o. female with history of hypertension, HFpEF (LVEF 51% on 11/22/2024), coronary artery disease on aspirin, stroke, class I obesity (BMI 33), CKD, peptic ulcer disease and type 2 diabetes as well as chronic pain due to degenerative lumbar spinal stenosis on opioids who presented on 4/22 due dislodged chronic Bateman catheter while having a bowel movement she had some gastric distention noted in the ED, but otherwise was feeling well without symptoms found to have elevated creatinine on admission 1.71 improving 1.39 on day 2 (baseline 0.9-1.2) with CT abdomen performed for distention showing marked distention of the stomach with gas and ingested material with duodenum and rest of small bowel normal caliber with moderate stool burden in colon overall concerning for gastric distention in the setting of opioid use, diabetes, recent meal less concerning for any functional gastroparesis as patient asymptomatic leading to GI consult.    - Gastric distention seen on CT likely in the setting of recent meal as well as diabetes, opioid use and constipation.  No clinical evidence of gastroparesis.  Patient says she often does eat smaller meals now, but overall has been feeling well without nausea, vomiting, weight loss  - Recommend bowel regiment with MiraLAX twice daily, senna daily  - Limit opioids as able  - No need for endoscopic evaluation if patient asymptomatic  - Defer timing of NG tube removal to  surgery  - If patient tolerating diet starting with clear liquid diet then progressing to regular and okay for discharge and follow-up in GI clinic    Type 2 diabetes mellitus with other skin complications (HCC)  Lab Results   Component Value Date    HGBA1C 7.4 (H) 10/12/2024       Recent Labs     04/23/25  0741   POCGLU 139       Blood Sugar Average: Last 72 hrs:  (P) 139    Other chronic pain    I have discussed the above management plan in detail with the primary service.     History of Present Illness   HPI:  Mattie Varela is a 80 y.o. female with history of hypertension, HFpEF, coronary artery disease on aspirin, stroke, class I obesity (BMI 33), CKD and type 2 diabetes as well as chronic pain due to degenerative lumbar spinal stenosis on opioids who presented on 4/22 due dislodged chronic Bateman catheter.  Patient reports her Bateman catheter was dislodged while having a bowel movement.  She was able to have a bowel movement and is passing flatus.  She states she has been feeling well without nausea, vomiting, abdominal pain.  Tolerating a diet without weight loss.  She takes MiraLAX most days and has a bowel movement which she takes MiraLAX.  She did have some gastric distention on admission leading to CT abdomen.  She was admitted due to SADAF and CT showing gastric distention with NG tube placed overnight.  Patient takes scheduled morphine and fentanyl patch at home.  Found to have elevated creatinine on admission 1.71 improving 1.39 on day 2 (baseline 0.9-1.2).  Hemoglobin 10.6 on admission with baseline 9-11.  Alkaline phosphatase 132.  CT abdomen showing marked distention of the stomach with gas and ingested material with duodenum and rest of small bowel normal caliber with moderate stool burden in colon with stable 6.5 cm thick walled thin collection along right lower quadrant ventral abdominal wall.    EGD 10/13/2022 showing gastritis and small duodenal bulb linear ulcer    EGD 7/15/2022 showing  esophageal peptic stricture with large, cratered Harjit 2C prepyloric ulcer    EGD/colonoscopy 7/22/2021 showing erosive gastritis with 2 small clean-based prepyloric ulcers with left-sided diverticulosis, recommended no further screening colonoscopies given age    EGD 7/16/2020 showing small pyloric channel ulcers with duodenal bulb ulcers that were clean-based    Colonoscopy 3/2/2016 showing diverticulosis with internal hemorrhoids    Review of Systems   Constitutional:  Negative for fever.   Cardiovascular:  Negative for leg swelling.   Gastrointestinal:  Positive for constipation. Negative for abdominal pain, blood in stool, diarrhea, nausea, rectal pain and vomiting.   All other systems reviewed and are negative.    Historical Information   Past Medical History:   Diagnosis Date    Acid reflux     Acute kidney injury (HCC) 06/18/2022    Anemia     hx of iron-deficient    Anxiety     Arthritis     Asthma     last needed inhaler last year 2020    Basal cell carcinoma     upper lip    Chronic narcotic dependence (HCC)     Chronic pain     Colitis 11/19/2024    Colon polyp     Cystocele     Diabetes mellitus (HCC)     stable    Disease of thyroid gland     hypothyroidism    Diverticulosis     Dizziness     at times    Dysfunctional uterine bleeding     last assessed - [id number]    Dysphagia     Fibromyalgia     Gastric ulcer     Gastroparesis     History of colonic polyps     last assessed - [id number]    History of gastroesophageal reflux (GERD)     Hypercholesterolemia     Hyperlipidemia     Hypertension     Hyponatremia 01/13/2024    IBS (irritable bowel syndrome)     Pneumobilia 06/18/2022    Post laminectomy syndrome     S/P insertion of spinal cord stimulator 07/18/2018    s/p [employer] loop recorder 3/2/2024 03/02/2024    Seasonal allergies     Shortness of breath     exertional    Spinal stenosis     Status post lumbar spinal fusion 03/16/2018    Stroke (HCC)     pt states slight stroke March 2022     Past  Surgical History:   Procedure Laterality Date    APPENDECTOMY      BACK SURGERY      BREAST CYST EXCISION Left     CARDIAC CATHETERIZATION  11/14/2023    Procedure: Cardiac catheterization;  Surgeon: Randolph Holt MD;  [facility];  Service: Cardiology    CARDIAC CATHETERIZATION N/A 11/14/2023    Procedure: Cardiac Coronary Angiogram;  Surgeon: Randolph Holt MD;  [facility];  Service: Cardiology    CARDIAC ELECTROPHYSIOLOGY PROCEDURE N/A 3/2/2024    Procedure: Cardiac loop recorder implant;  Surgeon: Edu Fontaine MD;  [facility];  Service: Cardiology    CHOLECYSTECTOMY      COLONOSCOPY      ESOPHAGOGASTRODUODENOSCOPY N/A 09/28/2016    Procedure: ESOPHAGOGASTRODUODENOSCOPY (EGD);  Surgeon: Mylene Moeller MD;  Location: [facility];  Service:     HERNIA REPAIR      HYSTERECTOMY      TTAH-BSO age 28    LAMINECTOMY      LUMBAR LAMINECTOMY      OOPHORECTOMY      age 28    SC ARTHRODESIS POSTERIOR/PSTLAT TQ 1NTRSPC THORACIC N/A 06/04/2018    Procedure: Reopening of lumbar incision for T12-L5 posterior instrumented fixation and fusion and T12-L4 posterior decompression;  Surgeon: Wood Rogel MD;  Location: [facility];  Service: Neurosurgery    SC COLONOSCOPY FLX DX W/COLLJ SPEC WHEN PFRMD N/A 03/02/2016    Procedure: EGD AND COLONOSCOPY;  Surgeon: Mylene Moeller MD;  [facility];  Service: Gastroenterology    SC DILATION ESOPH UNGUIDED SOUND/BOUGIE 1/MULT PASS N/A 09/28/2016    Procedure: DILATATION ESOPHAGEAL;  Surgeon: Mylene Moeller MD;  [facility];  Service: Gastroenterology    SC ESOPHAGOGASTRODUODENOSCOPY TRANSORAL DIAGNOSTIC N/A 07/18/2016    Procedure: ESOPHAGOGASTRODUODENOSCOPY (EGD);  Surgeon: Mylene Moeller MD;  [facility];  Service: Gastroenterology    SC INSJ/RPLCMT SPINAL NPG/RCVR POCKET CRTJ&CONNJ Left 06/04/2018    Procedure: removal of left buttock implantable pulse generator and placement of new  implantable pulse  generator;  Surgeon: Wood Rogel MD;  Location[facility];  Service: Neurosurgery     Social History     Tobacco Use    Smoking status: Former     Current packs/day: 0.00     Types: Cigarettes     Quit date: 1970     Years since quittin.3    Smokeless tobacco: Never    Tobacco comments:     Denied history of current ever day smoker, Former smoker and Never smoker all documented in [employer]   Vaping Use    Vaping status: Never Used   Substance and Sexual Activity    Alcohol use: Not Currently     Comment: Denied history of alcohol use    Drug use: No     Comment: Denied history of drug use    Sexual activity: Not Currently     E-Cigarette/Vaping    E-Cigarette Use Never User      E-Cigarette/Vaping Substances    Nicotine No     THC No     CBD No     Flavoring No     Other No     Unknown No      Family history non-contributory    Objective :  Temp:  [98.6 °F (37 °C)-99.3 °F (37.4 °C)] 98.6 °F (37 °C)  HR:  [89-93] 89  BP: (113-149)/(54-80) 113/54  Resp:  [16-20] 18  SpO2:  [95 %-98 %] 95 %  O2 Device: None (Room air)    Physical Exam  Vitals and nursing note reviewed.   Constitutional:       General: She is not in acute distress.     Appearance: She is well-developed and normal weight.   Abdominal:      General: Abdomen is flat. There is distension.      Palpations: Abdomen is soft.      Tenderness: There is no abdominal tenderness.   Musculoskeletal:         General: No swelling.   Neurological:      Mental Status: She is alert.           Lab Results: I have reviewed the following results:none    Imaging Results Review: No pertinent imaging studies reviewed.  Other Study Results Review: No additional pertinent studies reviewed.    Administrative Statements   I have spent a total time of 32 minutes in caring for this patient on the day of the visit/encounter including Diagnostic results, Prognosis, Risks and benefits of tx options, Instructions for management, and Patient and family education.

## 2025-04-23 NOTE — PROGRESS NOTES
Progress Note - Surgery-General   Name: Mattie Varela 80 y.o. female I MRN: [id number]  Unit/Bed#: [place] [address] -01 I Date of Admission: 4/22/2025   Date of Service: 4/23/2025 I Hospital Day: 1    Assessment & Plan  Gastric outlet obstruction  Patient is an 80-year-old female with past medical history of CVA, hypertension, HFpEF, CAD, CKD, type 2 diabetes who presented with CT findings demonstrating marked distention of the stomach with gas and ingested material concerning for gastric outlet obstruction.    - Continue N.p.o., NG tube  - Plan for GI consultation for possible EGD to determine source of obstruction. Appreciate recommendations  - Pain and nausea control as needed  - DVT ppx  - Rest of care per primary team  Acute kidney injury superimposed on chronic kidney disease  (HCC)  Patient appears to have SADAF on CKD on admission with a creatinine of 1.71 which appears elevated from baseline of 1.4-1.5. Improved today.     - Recommend IV fluid resuscitation  - Care per primary team    Please contact the SecureChat role for the Surgery-General service with any questions/concerns.    Subjective   No acute events overnight. Pain is well controlled. Denies N/V, fevers, chills, CP, SOB. Tolerating diet. Voiding well. Having BM and passing flatus.       Objective :  Temp:  [98.9 °F (37.2 °C)-99.3 °F (37.4 °C)] 99.3 °F (37.4 °C)  HR:  [90-93] 91  BP: (125-149)/(61-80) 149/80  Resp:  [16-20] 20  SpO2:  [95 %-98 %] 95 %  O2 Device: None (Room air)    I/O         04/21 0701  04/22 0700 04/22 0701  04/23 0700 04/23 0701  04/24 0700    P.O.  0     Total Intake(mL/kg)  0 (0)     Urine (mL/kg/hr)  825     Emesis/NG output  75     Total Output  900     Net  -900                  Lines/Drains/Airways       Active Status       Name Placement date Placement time Site Days    NG/OG/Enteral Tube Nasogastric 18 Fr Right nare 04/22/25  2333  Right nare  less than 1    Urethral Catheter Latex 16 Fr. 04/22/25 2027  Latex  less than  1                  Physical Exam  Vitals reviewed.   Constitutional:       Appearance: Normal appearance.   HENT:      Head: Normocephalic and atraumatic.      Right Ear: External ear normal.      Left Ear: External ear normal.      Nose: Nose normal.   Eyes:      Conjunctiva/sclera: Conjunctivae normal.   Cardiovascular:      Rate and Rhythm: Normal rate.      Comments: Appears well perfused  Pulmonary:      Effort: Pulmonary effort is normal. No respiratory distress.   Abdominal:      General: Abdomen is flat. There is distension (significant upper abdominal distension, appears improved from last evening).      Palpations: Abdomen is soft.      Tenderness: There is no abdominal tenderness. There is no guarding or rebound.      Comments: No evidence of peritonitis   Skin:     General: Skin is warm and dry.   Neurological:      General: No focal deficit present.      Mental Status: She is alert and oriented to person, place, and time.   Psychiatric:         Mood and Affect: Mood normal.         Behavior: Behavior normal.           Lab Results: I have reviewed the following results:  Recent Labs     04/22/25 2036 04/23/25  0458   WBC 7.79 6.88   HGB 10.6* 9.7*   HCT 33.5* 31.7*    279   SODIUM 136 139   K 5.0 3.8   CL 95* 98   CO2 34* 34*   BUN 19 18   CREATININE 1.71* 1.39*   GLUC 168* 144*   AST 28  --    ALT 11  --    ALB 3.7  --    TBILI 0.31  --    ALKPHOS 132*  --        Imaging Results Review: I reviewed radiology reports from this admission including: CT abdomen/pelvis.  Other Study Results Review: No additional pertinent studies reviewed.    VTE Pharmacologic Prophylaxis: Heparin  VTE Mechanical Prophylaxis: sequential compression device

## 2025-04-23 NOTE — UTILIZATION REVIEW
Initial Clinical Review    Admission: Date/Time/Statement:   Admission Orders (From admission, onward)       Ordered        04/22/25 2352  INPATIENT ADMISSION  Once                          Orders Placed This Encounter   Procedures    INPATIENT ADMISSION     Standing Status:   Standing     Number of Occurrences:   1     Level of Care:   Med Surg [16]     Estimated length of stay:   More than 2 Midnights     Certification:   I certify that inpatient services are medically necessary for this patient for a duration of greater than two midnights. See H&P and MD Progress Notes for additional information about the patient's course of treatment.     ED Arrival Information       Expected   -    Arrival   4/22/2025 19:47    Acuity   Urgent              Means of arrival   Ambulance    Escorted by   [facility] ([facility])    Service   Hospitalist    Admission type   Emergency              Arrival complaint   abdominal pain             Chief Complaint   Patient presents with    Urinary Catheter Problem     Pt resides at [facility].  Pt states she was having a bowel movement and her urinary catheter came out. Pt reports abd pain after the ahn fell out.  Pt denies n/v/d.        Initial Presentation: 80 y.o. female presents to the ED via EMS from nursing facility w/ c/o ahn cath dislodgement after having BM, chronic back pain.  PMH: L spinal stenosis, chronic urinary retention w/ chronic ahn, chronic dHF, DM.  In the ED VS stable, pain rated 10/10.  Treated with IV Valium x 1.  Labs - elevated alk phos, creat, glucose.  Imaging - stomach distention w/ gas, ingested material poss gastric outlet obstruction;  NGT courses inferior to the diaphragm.  On exam abd distention.  Admitted to INPATIENT status with Gastric Outlet Obstruction, SADAF on CKD, chronic dHF, chronic pain - PLAN: Surgery and GI consult, NG tube inserted, NPO, multimodal analgesia, IV fluids, hold Torsemide, tren renal indices, continue Toprol, daily wt,  SSI cover, hold oral DM meds.      Anticipated Length of Stay/Certification Statement: Patient will be admitted on an inpatient basis with an anticipated length of stay of greater than 2 midnights secondary to gastric outlet obstruction.    4/22 Surgery Consult - Gastric outlet obst, SADAF - no acute surgical intervention, NGT insertion, NPO, GI for poss EGD, analgesia, antiemetics, DVT PPX, IV fluids.  On exam significant upper abd distention, mild epigastric tenderness.       Date: 4/23   Day 2:   Gastric Outlet Obstruction, SADAF on CKD, chronic dHF, chronic pain - continue NGT, NPO, IV fluids,  no acute events. Pain well controlled, voiding well, passing flatus and stool.  Still with significant upper abd distention, improving from admit. Downtrending creat, Hgb down to 9.7.     4/23 GI Consult - Gastric outlet obstruction likely d/t recent meal as well as diabetes, opioid use and constipation.  No clinical evidence of gastroparesis - bowel regiment with MiraLAX twice daily, senna daily, limit opioids, no Endoscopy needed. Can start clears and advance. OP f/u w/ GI.     Date: 4/24  Day 3: Has surpassed a 2nd midnight with active treatments and services.  Gastric Outlet Obstruction, SADAF on CKD, chronic dHF, chronic pain - ok to d/c NGT. Start clears and advance.  Continue PPI, OP EGD. Pain controlled. No flatus or BM.  Continue IV fluids.        ED Treatment-Medication Administration from 04/22/2025 1947 to 04/23/2025 0109         Date/Time Order Dose Route Action     04/22/2025 2322 diazepam (VALIUM) injection 2.5 mg 2.5 mg Intravenous Given            Scheduled Medications:  aspirin, 81 mg, Oral, Daily  atorvastatin, 40 mg, Oral, Daily  fentaNYL, 1 patch, Transdermal, Q72H  gabapentin, 300 mg, Oral, HS  heparin (porcine), 5,000 Units, Subcutaneous, Q8H ARY  latanoprost, 1 drop, Both Eyes, HS  levothyroxine, 37.5 mcg, Oral, Early Morning  metoprolol succinate, 12.5 mg, Oral, Daily  morphine, 15 mg, Oral,  Q6H  nystatin, , Topical, BID  oxybutynin, 5 mg, Oral, BID  pantoprazole, 40 mg, Oral, Early Morning  polyethylene glycol, 17 g, Oral, BID  senna, 1 tablet, Oral, BID  [Held by provider] torsemide, 30 mg, Oral, Daily      Continuous IV Infusions:  dextrose 5% lactated ringer's, 75 mL/hr, Intravenous, Continuous      PRN Meds:  acetaminophen, 1,000 mg, Intravenous, Q6H PRN - x 1 4/24  [Held by provider] acetaminophen, 975 mg, Oral, Q6H PRN  [Held by provider] aluminum-magnesium hydroxide-simethicone, 30 mL, Oral, Q6H PRN  HYDROmorphone, 0.5 mg, Intravenous, Q4H PRN - x 2 4/23, x 3 4/24  HYDROmorphone, 0.2 mg, Intravenous, Q4H PRN  ondansetron, 4 mg, Intravenous, Q6H PRN  [Held by provider] zolpidem, 5 mg, Oral, HS PRN  IV Tylenol x 1 PRN 4/23 & D/c    ED Triage Vitals   Temperature Pulse Respirations Blood Pressure SpO2 Pain Score   04/22/25 1950 04/22/25 1950 04/22/25 1950 04/22/25 1950 04/22/25 1950 04/23/25 0217   98.9 °F (37.2 °C) 91 16 140/65 96 % 10 - Worst Possible Pain     Weight (last 2 days)       Date/Time Weight    04/23/25 01:26:05 78.6 (173.28)            Vital Signs (last 3 days)       Date/Time Temp Pulse Resp BP MAP (mmHg) SpO2 O2 Device Patient Position - Orthostatic VS Pain    04/24/25 1338 -- -- -- -- -- -- -- -- 10 - Worst Possible Pain    04/24/25 1207 -- -- -- -- -- -- -- -- 8    04/24/25 0933 -- -- -- -- -- -- -- -- 8    04/24/25 07:48:02 97.3 °F (36.3 °C) 88 16 166/90 115 100 % -- -- --    04/24/25 0400 -- -- -- -- -- -- -- -- 9 04/23/25 21:19:55 98.3 °F (36.8 °C) 103 20 168/91 117 95 % -- -- --    04/23/25 2015 -- -- -- -- -- -- None (Room air) -- 9 04/23/25 2004 -- -- -- -- -- -- -- -- 9 04/23/25 15:49:19 -- 93 -- 165/77 106 96 % -- -- --    04/23/25 1542 -- -- -- -- -- -- -- -- 10 - Worst Possible Pain    04/23/25 15:37:07 98.7 °F (37.1 °C) 95 20 170/78 109 97 % -- -- --    04/23/25 0826 -- -- -- -- -- -- None (Room air) -- 10 - Worst Possible Pain    04/23/25 07:43:51 98.6 °F (37  °C) 89 18 113/54 74 95 % -- -- --    04/23/25 0217 -- -- -- -- -- -- -- -- 10 - Worst Possible Pain    04/23/25 0130 -- -- -- -- -- -- None (Room air) -- --    04/23/25 01:26:05 99.3 °F (37.4 °C) 91 20 149/80 103 95 % None (Room air) Lying --    04/23/25 0000 -- 92 18 145/69 96 98 % None (Room air) Sitting --    04/22/25 2300 -- 93 18 148/64 92 97 % None (Room air) Sitting --    04/22/25 2215 -- 90 16 125/61 88 96 % None (Room air) Sitting --    04/22/25 1950 98.9 °F (37.2 °C) 91 16 140/65 -- 96 % None (Room air) Sitting --              Pertinent Labs/Diagnostic Test Results:   Radiology:  XR chest portable   Final Interpretation by Tony Perez MD (04/23 0925)      Enteric tube courses inferior to the diaphragm.            Workstation performed: [id number]         CT abdomen pelvis wo contrast   Final Interpretation by Kermit Link MD (04/22 2157)      Marked distention of the stomach with gas and ingested material, correlate for gastric outlet obstruction. The small and large bowel are normal in caliber.      Workstation performed: [id number]         FL upper GI UGI    (Results Pending)        Cardiology:  No orders to display     GI:  No orders to display           Results from last 7 days   Lab Units 04/23/25  0458 04/22/25  2036   WBC Thousand/uL 6.88 7.79   HEMOGLOBIN g/dL 9.7* 10.6*   HEMATOCRIT % 31.7* 33.5*   PLATELETS Thousands/uL 279 284   TOTAL NEUT ABS Thousands/µL  --  4.70         Results from last 7 days   Lab Units 04/23/25  0458 04/22/25  2036   SODIUM mmol/L 139 136   POTASSIUM mmol/L 3.8 5.0   CHLORIDE mmol/L 98 95*   CO2 mmol/L 34* 34*   ANION GAP mmol/L 7 7   BUN mg/dL 18 19   CREATININE mg/dL 1.39* 1.71*   EGFR ml/min/1.73sq m 35 27   CALCIUM mg/dL 8.6 9.0     Results from last 7 days   Lab Units 04/22/25  2036   AST U/L 28   ALT U/L 11   ALK PHOS U/L 132*   TOTAL PROTEIN g/dL 6.8   ALBUMIN g/dL 3.7   TOTAL BILIRUBIN mg/dL 0.31     Results from last 7 days   Lab Units  04/24/25  1107 04/24/25  0652 04/24/25  0633 04/24/25  0017 04/23/25  2352 04/23/25  1602 04/23/25  1149 04/23/25  0741   POC GLUCOSE mg/dl 147* 164* 55* 143* 37* 138 129 139     Results from last 7 days   Lab Units 04/23/25  0458 04/22/25  2036   GLUCOSE RANDOM mg/dL 144* 168*         Results from last 7 days   Lab Units 04/23/25  0458   HEMOGLOBIN A1C % 7.4*   EAG mg/dl 166     Past Medical History:   Diagnosis Date    Acid reflux     Acute kidney injury (HCC) 06/18/2022    Anemia     hx of iron-deficient    Anxiety     Arthritis     Asthma     last needed inhaler last year 2020    Basal cell carcinoma     upper lip    Chronic narcotic dependence ([facility])     Chronic pain     Colitis 11/19/2024    Colon polyp     Cystocele     Diverticulosis     Dizziness     at times    Dysfunctional uterine bleeding     last assessed - [id number]    Dysphagia     Fibromyalgia     Gastric ulcer     Gastroparesis     History of colonic polyps     last assessed - [id number]    History of gastroesophageal reflux (GERD)     Hypercholesterolemia     Hyperlipidemia     Hypertension     Hyponatremia 01/13/2024    IBS (irritable bowel syndrome)     Pneumobilia 06/18/2022    Post laminectomy syndrome     S/P insertion of spinal cord stimulator 07/18/2018    s/p [employer] loop recorder 3/2/2024 03/02/2024    Seasonal allergies     Shortness of breath     exertional    Spinal stenosis     Status post lumbar spinal fusion 03/16/2018    Stroke ([facility])     pt states slight stroke March 2022     Present on Admission:   Gastric outlet obstruction   Acute kidney injury superimposed on chronic kidney disease  (HCC)   Chronic diastolic congestive heart failure (HCC)   Hypertension   Type 2 diabetes mellitus with other skin complications (HCC)   Other chronic pain   Chronic obstructive pulmonary disease, unspecified COPD type ([facility])   Hypothyroidism      Admitting Diagnosis: Gastric outlet obstruction [K31.1]  Problem with Bateman catheter, initial encounter  (HCC) [T83.9XXA]  Problem with urinary catheter (HCC) [T83.9XXA]  Age/Sex: 80 y.o. female    Network Utilization Review Department  ATTENTION: Please call with any questions or concerns to [phone] and carefully listen to the prompts so that you are directed to the right person. All voicemails are confidential.   For Discharge needs, contact Care Management DC Support Team at [phone] opt. [phone]  Send all requests for admission clinical reviews, approved or denied determinations and any other requests to dedicated fax number below belonging to the campus where the patient is receiving treatment. List of dedicated fax numbers for the Facilities:  FACILITY NAME UR FAX NUMBER   ADMISSION DENIALS (Administrative/Medical Necessity) [phone]   DISCHARGE SUPPORT TEAM (NETWORK) [phone]   PARENT CHILD HEALTH (Maternity/NICU/Pediatrics) [phone]   [facility] [phone]   [facility] [phone]   [facility] [phone]   [facility] [phone]   [facility] [phone]   [facility] [phone]   [facility] [phone]   [facility] [phone]   [facility] [phone]   [facility] [phone]   [facility] [phone]   [facility] [phone]

## 2025-04-23 NOTE — H&P
"H&P - Hospitalist   Name: Mattie Varela 80 y.o. female I MRN: [id number]  Unit/Bed#: ED-25 I Date of Admission: 4/22/2025   Date of Service: 4/23/2025 I Hospital Day: 1     Assessment & Plan  Gastric outlet obstruction  Presents with catheter dislodgement, found with significant stomach distension   CT abd/pelvis noting marked distension of stomach with gas/ingested material, normal small/large bowel    Plan:  Surgery recommendations appreciated, NGT placed, NPO  GI consult pending  Multimodal analgesia  Acute kidney injury superimposed on chronic kidney disease  (HCC)  CKD in setting of age-related, hypertensive, diabetic nephropathy. Suspect pre-renal SADAF in setting of poor PO intake, diuretic use  Baseline Cr: 1.4  Admit Cr: 1.7     Plan:  Isolyte overnight   Hold torsemide for now   Monitor renal function, renal dose medications, maintain normotension/euvolemia, avoid nephrotoxic agents, I&Os  Chronic diastolic congestive heart failure (HCC)  Wt Readings from Last 3 Encounters:   04/23/25 78.6 kg (173 lb 4.5 oz)   04/11/25 79.8 kg (176 lb)   03/04/25 76.2 kg (168 lb)     Compensated  Outpatient regimen: Toprol 12.5mg daily, torsemide 30mg daily    Plan:  Hold torsemide   Cardiac diet, weights, I&O, replenish electrolytes as needed  Type 2 diabetes mellitus with other skin complications ([facility])  Lab Results   Component Value Date    HGBA1C 7.4 (H) 10/12/2024     No results for input(s): \"POCGLU\" in the last 72 hours.  Blood Sugar Average: Last 72 hrs:    A1c above goal    Plan:  BG goal 140-200 while inpatient  SSI  Holding oral antihyperglycemics  Other chronic pain  With chronic pain disorder secondary to degenerative lumbar spinal stenosis   PDMP on morphine sulfate 15mg q6h, fentanyl patch    Plan:  Multimodal analgesia   Titrate IV analgesia while NPO    VTE Pharmacologic Prophylaxis: VTE Score: 4 Moderate Risk (Score 3-4) - Pharmacological DVT Prophylaxis Ordered: heparin.  Code Status: Prior   Discussion " with family:   Anticipated Length of Stay: Patient will be admitted on an inpatient basis with an anticipated length of stay of greater than 2 midnights secondary to gastric outlet obstruction.    History of Present Illness   Chief Complaint:   Chief Complaint   Patient presents with    Urinary Catheter Problem     Pt resides at [facility].  Pt states she was having a bowel movement and her urinary catheter came out. Pt reports abd pain after the ahn fell out.  Pt denies n/v/d.      Mattie Varela is a 80 y.o. female with a PMH of HTN, HFpEF, COPD, CKD, chronic pain who presents with catheter problem.   History obtained from patient, chart review and discussion with ED STEPHANIE. Presents tonight for catheter dislodgement after bowel movement at residence. On exam in the ED the patient was found to have a distended abdomen. No complaints of abdominal pain, nausea. Reports having bowel movements. Does have pain in her back but is chronic from her spinal disease. Has not had the best appetite as of recent. Reports compliance with prescribed medications.     Review of Systems    Historical Information   Past Medical History:   Diagnosis Date    Acid reflux     Acute kidney injury (HCC) 06/18/2022    Anemia     hx of iron-deficient    Anxiety     Arthritis     Asthma     last needed inhaler last year 2020    Basal cell carcinoma     upper lip    Chronic narcotic dependence (HCC)     Chronic pain     Colitis 11/19/2024    Colon polyp     Cystocele     Diabetes mellitus (HCC)     stable    Disease of thyroid gland     hypothyroidism    Diverticulosis     Dizziness     at times    Dysfunctional uterine bleeding     last assessed - [id number]    Dysphagia     Fibromyalgia     Gastric ulcer     Gastroparesis     History of colonic polyps     last assessed - [id number]    History of gastroesophageal reflux (GERD)     Hypercholesterolemia     Hyperlipidemia     Hypertension     Hyponatremia 01/13/2024    IBS (irritable bowel syndrome)      Pneumobilia 06/18/2022    Post laminectomy syndrome     S/P insertion of spinal cord stimulator 07/18/2018    s/p [employer] loop recorder 3/2/2024 03/02/2024    Seasonal allergies     Shortness of breath     exertional    Spinal stenosis     Status post lumbar spinal fusion 03/16/2018    Stroke (HCC)     pt states slight stroke March 2022     Past Surgical History:   Procedure Laterality Date    APPENDECTOMY      BACK SURGERY      BREAST CYST EXCISION Left     CARDIAC CATHETERIZATION  11/14/2023    Procedure: Cardiac catheterization;  Surgeon: Randolph Holt MD;  [facility];  Service: Cardiology    CARDIAC CATHETERIZATION N/A 11/14/2023    Procedure: Cardiac Coronary Angiogram;  Surgeon: Randolph Holt MD;  [facility];  Service: Cardiology    CARDIAC ELECTROPHYSIOLOGY PROCEDURE N/A 3/2/2024    Procedure: Cardiac loop recorder implant;  Surgeon: Edu Fontaine MD;  [facility];  Service: Cardiology    CHOLECYSTECTOMY      COLONOSCOPY      ESOPHAGOGASTRODUODENOSCOPY N/A 09/28/2016    Procedure: ESOPHAGOGASTRODUODENOSCOPY (EGD);  Surgeon: Mylene Moeller MD;  Location: [facility];  Service:     HERNIA REPAIR      HYSTERECTOMY      TTAH-BSO age 28    LAMINECTOMY      LUMBAR LAMINECTOMY      OOPHORECTOMY      age 28    MD ARTHRODESIS POSTERIOR/PSTLAT TQ 1NTRSPC THORACIC N/A 06/04/2018    Procedure: Reopening of lumbar incision for T12-L5 posterior instrumented fixation and fusion and T12-L4 posterior decompression;  Surgeon: Wood Rogel MD;  Location: [facility];  Service: Neurosurgery    MD COLONOSCOPY FLX DX W/COLLJ SPEC WHEN PFRMD N/A 03/02/2016    Procedure: EGD AND COLONOSCOPY;  Surgeon: Mylene Moeller MD;  [facility]: [facility];  Service: Gastroenterology    MD DILATION ESOPH UNGUIDED SOUND/BOUGIE 1/MULT PASS N/A 09/28/2016    Procedure: DILATATION ESOPHAGEAL;  Surgeon: Mylene Moeller MD;  [facility]: [facility];  Service: Gastroenterology    MD  ESOPHAGOGASTRODUODENOSCOPY TRANSORAL DIAGNOSTIC N/A 2016    Procedure: ESOPHAGOGASTRODUODENOSCOPY (EGD);  Surgeon: Mylene Moeller MD;  [facility];  Service: Gastroenterology    LA INSJ/RPLCMT SPINAL NPG/RCVR POCKET CRTJ&CONNJ Left 2018    Procedure: removal of left buttock implantable pulse generator and placement of new  implantable pulse generator;  Surgeon: Wood Rogel MD;  [facility];  Service: Neurosurgery     Social History     Tobacco Use    Smoking status: Former     Current packs/day: 0.00     Types: Cigarettes     Quit date: 1970     Years since quittin.3    Smokeless tobacco: Never    Tobacco comments:     Denied history of current ever day smoker, Former smoker and Never smoker all documented in [employer]   Vaping Use    Vaping status: Never Used   Substance and Sexual Activity    Alcohol use: Not Currently     Comment: Denied history of alcohol use    Drug use: No     Comment: Denied history of drug use    Sexual activity: Not Currently     E-Cigarette/Vaping    E-Cigarette Use Never User      E-Cigarette/Vaping Substances    Nicotine No     THC No     CBD No     Flavoring No     Other No     Unknown No      Family History   Problem Relation Age of Onset    Lung cancer Mother 46    Pulmonary embolism Father     No Known Problems Sister     No Known Problems Daughter     No Known Problems Daughter     Stroke Maternal Grandmother     Heart attack Maternal Grandfather     No Known Problems Paternal Grandmother     No Known Problems Paternal Grandfather     No Known Problems Maternal Aunt     Diabetes Family         Diabetes mellitus    Hypertension Family     Stroke Family         Stroke complications     Social History:  Marital Status:    Occupation:   Patient Pre-hospital Living Situation: Long Term Care Facility  Patient Pre-hospital Level of Mobility:   Patient Pre-hospital Diet Restrictions:     Meds/Allergies   I have reveiwed home medications using  records provided by [facility].  Prior to Admission medications    Medication Sig Start Date End Date Taking? Authorizing Provider   acetaminophen (TYLENOL) 325 mg tablet Take 3 tablets (975 mg total) by mouth every 8 (eight) hours 10/19/24  Yes Paco Carmona MD   albuterol (PROVENTIL HFA,VENTOLIN HFA) 90 mcg/act inhaler Inhale 2 puffs every 6 (six) hours as needed for wheezing or shortness of breath 6/29/22  Yes NANY Pollock   aspirin 81 mg chewable tablet Chew 81 mg daily   Yes Historical Provider, MD   atorvastatin (LIPITOR) 40 mg tablet TAKE ONE TABLET BY MOUTH ONCE DAILY 11/22/22  Yes NANY Pollock   Blood Glucose Monitoring Suppl (OneTouch Verio Reflect) w/Device KIT Check blood sugars twice daily. Please substitute with appropriate alternative as covered by patient's insurance. Dx: E11.65 2/20/23  Yes NANY Pollock   Cholecalciferol (True Vitamin D3) 1.25 MG (88678 UT) capsule Take 50,000 Units by mouth daily   Yes Historical Provider, MD   docusate sodium (COLACE) 100 mg capsule Take 1 capsule (100 mg total) by mouth 2 (two) times a day 6/5/24  Yes NAYN Ruby   ergocalciferol (VITAMIN D2) 50,000 units Take 50,000 Units by mouth Take I cap orally once monthly   Yes Historical Provider, MD   famotidine (PEPCID) 20 mg tablet Take 20 mg by mouth 2 (two) times a day 1 tab orally twice daily as needed for heartburn/acid reflux   Yes Historical Provider, MD   fentaNYL (DURAGESIC) 75 mcg/hr Place 1 patch on the skin every third day Apply 1 patch every 3 days   Yes Historical Provider, MD   ferrous sulfate 325 (65 Fe) mg tablet Take 1 tablet (325 mg total) by mouth daily with breakfast 1/10/24  Yes Paco Boyer MD   gabapentin (NEURONTIN) 300 mg capsule Take 1 capsule (300 mg total) by mouth daily at bedtime 2/13/25  Yes Kylie Sommers PA-C   glucose blood (OneTouch Verio) test strip Check blood sugars twice daily. Please substitute with appropriate alternative as covered by patient's  insurance. Dx: E11.65 2/20/23  Yes NANY Pollock   latanoprost (XALATAN) 0.005 % ophthalmic solution Administer 1 drop to both eyes daily at bedtime   Yes Historical Provider, MD   levothyroxine 75 mcg tablet Take 0.5 tablets (37.5 mcg total) by mouth daily in the early morning 1/17/24 4/22/25 Yes Alejandro Philip MD   lidocaine (LIDODERM) 5 % Apply 1 patch topically over 12 hours daily Remove & Discard patch within 12 hours or as directed by MD 10/20/24  Yes Paco Carmona MD   Lidocaine 4 % PTCH Apply 4 % topically Apply 1 every morning to back and remove at bedtime   Yes Historical Provider, MD   loperamide (IMODIUM A-D) 2 MG tablet Take 2 mg by mouth 4 (four) times a day as needed for diarrhea   Yes Historical Provider, MD   MAGNESIUM OXIDE 400 PO Take 400 mg by mouth 2 (two) times a day   Yes Historical Provider, MD   methocarbamol (ROBAXIN) 750 mg tablet Take 750 mg by mouth every 8 (eight) hours 1 tab orally every 8 hours as needed for muscle spasms   Yes Historical Provider, MD   metoprolol succinate (TOPROL-XL) 25 mg 24 hr tablet Take 0.5 tablets (12.5 mg total) by mouth daily 12/3/24  Yes Alexys Angel DO   Milnacipran HCl (Savella) 100 MG TABS Take 1 tablet (100 mg total) by mouth daily 3/5/23  Yes Venice Baires DO   morphine (MSIR) 15 mg tablet Take 1 tablet (15 mg total) by mouth every 6 (six) hours Max Daily Amount: 60 mg 2/13/25  Yes Kylie Sommers PA-C   ondansetron (ZOFRAN) 4 mg tablet Take 1 tablet (4 mg total) by mouth every 8 (eight) hours as needed for nausea or vomiting 1/23/25  Yes Naveen Ramon PA-C   OneTouch Delica Lancets 33G MISC Check blood sugars twice daily. Please substitute with appropriate alternative as covered by patient's insurance. Dx: E11.65 2/20/23  Yes NANY Pollock   polyethylene glycol (MIRALAX) 17 g packet Take 17 g by mouth daily 6/5/24  Yes NANY Ruby   potassium chloride (Klor-Con M20) 20 mEq tablet Take 1 tablet (20 mEq  total) by mouth daily 4/11/25  Yes Kylie Sommers PA-C   senna (SENOKOT) 8.6 mg Take 2 tablets (17.2 mg total) by mouth every morning 2/13/25  Yes Kylie Sommers PA-C   simethicone (MYLICON) 80 mg chewable tablet Chew 80 mg every 6 (six) hours as needed for flatulence Chew and swallow 1 tab orally 3 times daily as needed for gas   Yes Historical Provider, MD   torsemide (DEMADEX) 20 mg tablet Take 20 mg by mouth if needed (leg swelling)   Yes Historical Provider, MD   trospium chloride (SANCTURA) 20 mg tablet Take 1 tablet (20 mg total) by mouth 2 (two) times a day 2/12/25  Yes Benjamin Ramirez MD   zolpidem (AMBIEN CR) 6.25 MG CR tablet Take 6.25 mg by mouth daily at bedtime as needed for sleep   Yes Historical Provider, MD   torsemide (DEMADEX) 10 mg tablet Increase to 60 mg BID for 5 days, then decrease to 40 mg BID 4/11/25   Kylie Sommers PA-C     Allergies   Allergen Reactions    Penicillins Anaphylaxis, Hives and Other (See Comments)     Other reaction(s): Unknown Reaction    Sulfa Antibiotics Anaphylaxis and Other (See Comments)     Other reaction(s): Unknown Reaction    Aspartame - Food Allergy Other (See Comments) and Hypertension     Slurred speech, weakness, stroke sx    Iodinated Contrast Media Hives     Pt has taken prep prior for contrast and has not had any break through reaction    Keflex [Cephalexin] Hives       Objective :  Temp:  [98.9 °F (37.2 °C)] 98.9 °F (37.2 °C)  HR:  [90-93] 92  BP: (125-148)/(61-69) 145/69  Resp:  [16-18] 18  SpO2:  [96 %-98 %] 98 %  O2 Device: None (Room air)    Physical Exam  Vitals and nursing note reviewed.   Constitutional:       General: She is not in acute distress.     Appearance: She is well-developed.   HENT:      Head: Normocephalic and atraumatic.   Eyes:      Conjunctiva/sclera: Conjunctivae normal.   Cardiovascular:      Rate and Rhythm: Normal rate and regular rhythm.      Heart sounds: No murmur heard.  Pulmonary:      Effort: Pulmonary effort is normal. No  respiratory distress.      Breath sounds: Normal breath sounds.   Abdominal:      General: There is distension.      Palpations: Abdomen is soft.      Tenderness: There is no abdominal tenderness.   Musculoskeletal:         General: No swelling.      Cervical back: Neck supple.   Skin:     General: Skin is warm and dry.      Capillary Refill: Capillary refill takes less than 2 seconds.   Neurological:      Mental Status: She is alert.   Psychiatric:         Mood and Affect: Mood normal.          Lines/Drains:  Lines/Drains/Airways       Active Status       Name Placement date Placement time Site Days    NG/OG/Enteral Tube Nasogastric 18 Fr Right nare 04/22/25 2333  Right nare  less than 1    Urethral Catheter Latex 16 Fr. 04/22/25 2027  Latex  less than 1                  Urinary Catheter:  Goal for removal: N/A - Chronic [place]eman               Lab Results: I have reviewed the following results:  Results from last 7 days   Lab Units 04/22/25 2036   WBC Thousand/uL 7.79   HEMOGLOBIN g/dL 10.6*   HEMATOCRIT % 33.5*   PLATELETS Thousands/uL 284   SEGS PCT % 59   LYMPHO PCT % 25   MONO PCT % 10   EOS PCT % 5     Results from last 7 days   Lab Units 04/22/25 2036   SODIUM mmol/L 136   POTASSIUM mmol/L 5.0   CHLORIDE mmol/L 95*   CO2 mmol/L 34*   BUN mg/dL 19   CREATININE mg/dL 1.71*   ANION GAP mmol/L 7   CALCIUM mg/dL 9.0   ALBUMIN g/dL 3.7   TOTAL BILIRUBIN mg/dL 0.31   ALK PHOS U/L 132*   ALT U/L 11   AST U/L 28   GLUCOSE RANDOM mg/dL 168*             Lab Results   Component Value Date    HGBA1C 7.4 (H) 10/12/2024    HGBA1C 6.5 (H) 11/26/2023    HGBA1C 6.6 (H) 06/09/2023           Imaging Results Review: I personally reviewed the following image studies in PACS and associated radiology reports: chest xray and CT abdomen/pelvis. My interpretation of the radiology images/reports is: CXR no acute cardiopulmonary disease, NGT in place.  Other Study Results Review: No additional pertinent studies  reviewed.    Administrative Statements     ** Please Note: This note has been constructed using a voice recognition system. **

## 2025-04-23 NOTE — PLAN OF CARE
Problem: Prexisting or High Potential for Compromised Skin Integrity  Goal: Skin integrity is maintained or improved  Description: INTERVENTIONS:- Identify patients at risk for skin breakdown- Assess and monitor skin integrity- Assess and monitor nutrition and hydration status- Monitor labs - Assess for incontinence - Turn and reposition patient- Assist with mobility/ambulation- Relieve pressure over bony prominences- Avoid friction and shearing- Provide appropriate hygiene as needed including keeping skin clean and dry- Evaluate need for skin moisturizer/barrier cream- Collaborate with interdisciplinary team - Patient/family teaching- Consider wound care consult   Outcome: Progressing     Problem: PAIN - ADULT  Goal: Verbalizes/displays adequate comfort level or baseline comfort level  Description: Interventions:- Encourage patient to monitor pain and request assistance- Assess pain using appropriate pain scale- Administer analgesics based on type and severity of pain and evaluate response- Implement non-pharmacological measures as appropriate and evaluate response- Consider cultural and social influences on pain and pain management- Notify physician/advanced practitioner if interventions unsuccessful or patient reports new pain  Outcome: Progressing     Problem: INFECTION - ADULT  Goal: Absence or prevention of progression during hospitalization  Description: INTERVENTIONS:- Assess and monitor for signs and symptoms of infection- Monitor lab/diagnostic results- Monitor all insertion sites, i.e. indwelling lines, tubes, and drains- Monitor endotracheal if appropriate and nasal secretions for changes in amount and color- [place] appropriate cooling/warming therapies per order- Administer medications as ordered- Instruct and encourage patient and family to use good hand hygiene technique- Identify and instruct in appropriate isolation precautions for identified infection/condition  Outcome: Progressing  Goal:  Absence of fever/infection during neutropenic period  Description: INTERVENTIONS:- Monitor WBC  Outcome: Progressing     Problem: SAFETY ADULT  Goal: Patient will remain free of falls  Description: INTERVENTIONS:- Educate patient/family on patient safety including physical limitations- Instruct patient to call for assistance with activity - Consult OT/PT to assist with strengthening/mobility - Keep Call bell within reach- Keep bed low and locked with side rails adjusted as appropriate- Keep care items and personal belongings within reach- Initiate and maintain comfort rounds- Make Fall Risk Sign visible to staff- Offer Toileting every 2 Hours, in advance of need- Initiate/Maintain bed alarm- Obtain necessary fall risk management equipment: bed alarm- Apply yellow socks and bracelet for high fall risk patients- Consider moving patient to room near nurses station  Outcome: Progressing  Goal: Maintain or return to baseline ADL function  Description: INTERVENTIONS:-  Assess patient's ability to carry out ADLs; assess patient's baseline for ADL function and identify physical deficits which impact ability to perform ADLs (bathing, care of mouth/teeth, toileting, grooming, dressing, etc.)- Assess/evaluate cause of self-care deficits - Assess range of motion- Assess patient's mobility; develop plan if impaired- Assess patient's need for assistive devices and provide as appropriate- Encourage maximum independence but intervene and supervise when necessary- Involve family in performance of ADLs- Assess for home care needs following discharge - Consider OT consult to assist with ADL evaluation and planning for discharge- Provide patient education as appropriate  Outcome: Progressing  Goal: Maintains/Returns to pre admission functional level  Description: INTERVENTIONS:- Perform AM-PAC 6 Click Basic Mobility/ Daily Activity assessment daily.- Set and communicate daily mobility goal to care team and patient/family/caregiver. -  Collaborate with rehabilitation services on mobility goals if consulted- Perform Range of Motion 4 times a day.- Reposition patient every 2 hours.- Dangle patient 3 times a day- Stand patient 3 times a day- Ambulate patient 3 times a day- Out of bed to chair 3 times a day - Out of bed for meals 3 times a day- Out of bed for toileting- Record patient progress and toleration of activity level   Outcome: Progressing     Problem: DISCHARGE PLANNING  Goal: Discharge to home or other facility with appropriate resources  Description: INTERVENTIONS:- Identify barriers to discharge w/patient and caregiver- Arrange for needed discharge resources and transportation as appropriate- Identify discharge learning needs (meds, wound care, etc.)- Arrange for interpretive services to assist at discharge as needed- Refer to Case Management Department for coordinating discharge planning if the patient needs post-hospital services based on physician/advanced practitioner order or complex needs related to functional status, cognitive ability, or social support system  Outcome: Progressing     Problem: Knowledge Deficit  Goal: Patient/family/caregiver demonstrates understanding of disease process, treatment plan, medications, and discharge instructions  Description: Complete learning assessment and assess knowledge base.Interventions:- Provide teaching at level of understanding- Provide teaching via preferred learning methods  Outcome: Progressing     Problem: RESPIRATORY - ADULT  Goal: Achieves optimal ventilation and oxygenation  Description: INTERVENTIONS:- Assess for changes in respiratory status- Assess for changes in mentation and behavior- Position to facilitate oxygenation and minimize respiratory effort- Oxygen administered by appropriate delivery if ordered- Initiate smoking cessation education as indicated- Encourage broncho-pulmonary hygiene including cough, deep breathe, Incentive Spirometry- Assess the need for suctioning and  aspirate as needed- Assess and instruct to report SOB or any respiratory difficulty- Respiratory Therapy support as indicated  Outcome: Progressing     Problem: GASTROINTESTINAL - ADULT  Goal: Minimal or absence of nausea and/or vomiting  Description: INTERVENTIONS:- Administer IV fluids if ordered to ensure adequate hydration- Maintain NPO status until nausea and vomiting are resolved- Nasogastric tube if ordered- Administer ordered antiemetic medications as needed- Provide nonpharmacologic comfort measures as appropriate- Advance diet as tolerated, if ordered- Consider nutrition services referral to assist patient with adequate nutrition and appropriate food choices  Outcome: Progressing  Goal: Maintains or returns to baseline bowel function  Description: INTERVENTIONS:- Assess bowel function- Encourage oral fluids to ensure adequate hydration- Administer IV fluids if ordered to ensure adequate hydration- Administer ordered medications as needed- Encourage mobilization and activity- Consider nutritional services referral to assist patient with adequate nutrition and appropriate food choices  Outcome: Progressing  Goal: Maintains adequate nutritional intake  Description: INTERVENTIONS:- Monitor percentage of each meal consumed- Identify factors contributing to decreased intake, treat as appropriate- Assist with meals as needed- Monitor I&O, weight, and lab values if indicated- Obtain nutrition services referral as needed  Outcome: Progressing  Goal: Establish and maintain optimal ostomy function  Description: INTERVENTIONS:- Assess bowel function- Encourage oral fluids to ensure adequate hydration- Administer IV fluids if ordered to ensure adequate hydration - Administer ordered medications as needed- Encourage mobilization and activity- Nutrition services referral to assist patient with appropriate food choices- Assess stoma site- Consider wound care consult   Outcome: Progressing  Goal: Oral mucous membranes  remain intact  Description: INTERVENTIONS- Assess oral mucosa and hygiene practices- Implement preventative oral hygiene regimen- Implement oral medicated treatments as ordered- Initiate Nutrition services referral as needed  Outcome: Progressing     Problem: METABOLIC, FLUID AND ELECTROLYTES - ADULT  Goal: Electrolytes maintained within normal limits  Description: INTERVENTIONS:- Monitor labs and assess patient for signs and symptoms of electrolyte imbalances- Administer electrolyte replacement as ordered- Monitor response to electrolyte replacements, including repeat lab results as appropriate- Instruct patient on fluid and nutrition as appropriate  Outcome: Progressing  Goal: Fluid balance maintained  Description: INTERVENTIONS:- Monitor labs - Monitor I/O and WT- Instruct patient on fluid and nutrition as appropriate- Assess for signs & symptoms of volume excess or deficit  Outcome: Progressing  Goal: Glucose maintained within target range  Description: INTERVENTIONS:- Monitor Blood Glucose as ordered- Assess for signs and symptoms of hyperglycemia and hypoglycemia- Administer ordered medications to maintain glucose within target range- Assess nutritional intake and initiate nutrition service referral as needed  Outcome: Progressing

## 2025-04-23 NOTE — PLAN OF CARE
Problem: Prexisting or High Potential for Compromised Skin Integrity  Goal: Skin integrity is maintained or improved  Description: INTERVENTIONS:- Identify patients at risk for skin breakdown- Assess and monitor skin integrity- Assess and monitor nutrition and hydration status- Monitor labs - Assess for incontinence - Turn and reposition patient- Assist with mobility/ambulation- Relieve pressure over bony prominences- Avoid friction and shearing- Provide appropriate hygiene as needed including keeping skin clean and dry- Evaluate need for skin moisturizer/barrier cream- Collaborate with interdisciplinary team - Patient/family teaching- Consider wound care consult   Outcome: Progressing     Problem: PAIN - ADULT  Goal: Verbalizes/displays adequate comfort level or baseline comfort level  Description: Interventions:- Encourage patient to monitor pain and request assistance- Assess pain using appropriate pain scale- Administer analgesics based on type and severity of pain and evaluate response- Implement non-pharmacological measures as appropriate and evaluate response- Consider cultural and social influences on pain and pain management- Notify physician/advanced practitioner if interventions unsuccessful or patient reports new pain  Outcome: Progressing     Problem: INFECTION - ADULT  Goal: Absence or prevention of progression during hospitalization  Description: INTERVENTIONS:- Assess and monitor for signs and symptoms of infection- Monitor lab/diagnostic results- Monitor all insertion sites, i.e. indwelling lines, tubes, and drains- Monitor endotracheal if appropriate and nasal secretions for changes in amount and color- [place] appropriate cooling/warming therapies per order- Administer medications as ordered- Instruct and encourage patient and family to use good hand hygiene technique- Identify and instruct in appropriate isolation precautions for identified infection/condition  Outcome: Progressing  Goal:  Absence of fever/infection during neutropenic period  Description: INTERVENTIONS:- Monitor WBC  Outcome: Progressing     Problem: SAFETY ADULT  Goal: Patient will remain free of falls  Description: INTERVENTIONS:- Educate patient/family on patient safety including physical limitations- Instruct patient to call for assistance with activity - Consult OT/PT to assist with strengthening/mobility - Keep Call bell within reach- Keep bed low and locked with side rails adjusted as appropriate- Keep care items and personal belongings within reach- Initiate and maintain comfort rounds- Make Fall Risk Sign visible to staff- Offer Toileting every 2 Hours, in advance of need- Initiate/Maintain bed alarm- Obtain necessary fall risk management equipment: bed alarm- Apply yellow socks and bracelet for high fall risk patients- Consider moving patient to room near nurses station  Outcome: Progressing  Goal: Maintain or return to baseline ADL function  Description: INTERVENTIONS:-  Assess patient's ability to carry out ADLs; assess patient's baseline for ADL function and identify physical deficits which impact ability to perform ADLs (bathing, care of mouth/teeth, toileting, grooming, dressing, etc.)- Assess/evaluate cause of self-care deficits - Assess range of motion- Assess patient's mobility; develop plan if impaired- Assess patient's need for assistive devices and provide as appropriate- Encourage maximum independence but intervene and supervise when necessary- Involve family in performance of ADLs- Assess for home care needs following discharge - Consider OT consult to assist with ADL evaluation and planning for discharge- Provide patient education as appropriate  Outcome: Progressing  Goal: Maintains/Returns to pre admission functional level  Description: INTERVENTIONS:- Perform AM-PAC 6 Click Basic Mobility/ Daily Activity assessment daily.- Set and communicate daily mobility goal to care team and patient/family/caregiver. -  Collaborate with rehabilitation services on mobility goals if consulted- Perform Range of Motion 4 times a day.- Reposition patient every 2 hours.- Dangle patient 3 times a day- Stand patient 3 times a day- Ambulate patient 3 times a day- Out of bed to chair 3 times a day - Out of bed for meals 3 times a day- Out of bed for toileting- Record patient progress and toleration of activity level   Outcome: Progressing     Problem: DISCHARGE PLANNING  Goal: Discharge to home or other facility with appropriate resources  Description: INTERVENTIONS:- Identify barriers to discharge w/patient and caregiver- Arrange for needed discharge resources and transportation as appropriate- Identify discharge learning needs (meds, wound care, etc.)- Arrange for interpretive services to assist at discharge as needed- Refer to Case Management Department for coordinating discharge planning if the patient needs post-hospital services based on physician/advanced practitioner order or complex needs related to functional status, cognitive ability, or social support system  Outcome: Progressing     Problem: Knowledge Deficit  Goal: Patient/family/caregiver demonstrates understanding of disease process, treatment plan, medications, and discharge instructions  Description: Complete learning assessment and assess knowledge base.Interventions:- Provide teaching at level of understanding- Provide teaching via preferred learning methods  Outcome: Progressing     Problem: RESPIRATORY - ADULT  Goal: Achieves optimal ventilation and oxygenation  Description: INTERVENTIONS:- Assess for changes in respiratory status- Assess for changes in mentation and behavior- Position to facilitate oxygenation and minimize respiratory effort- Oxygen administered by appropriate delivery if ordered- Initiate smoking cessation education as indicated- Encourage broncho-pulmonary hygiene including cough, deep breathe, Incentive Spirometry- Assess the need for suctioning and  aspirate as needed- Assess and instruct to report SOB or any respiratory difficulty- Respiratory Therapy support as indicated  Outcome: Progressing     Problem: GASTROINTESTINAL - ADULT  Goal: Minimal or absence of nausea and/or vomiting  Description: INTERVENTIONS:- Administer IV fluids if ordered to ensure adequate hydration- Maintain NPO status until nausea and vomiting are resolved- Nasogastric tube if ordered- Administer ordered antiemetic medications as needed- Provide nonpharmacologic comfort measures as appropriate- Advance diet as tolerated, if ordered- Consider nutrition services referral to assist patient with adequate nutrition and appropriate food choices  Outcome: Progressing  Goal: Maintains or returns to baseline bowel function  Description: INTERVENTIONS:- Assess bowel function- Encourage oral fluids to ensure adequate hydration- Administer IV fluids if ordered to ensure adequate hydration- Administer ordered medications as needed- Encourage mobilization and activity- Consider nutritional services referral to assist patient with adequate nutrition and appropriate food choices  Outcome: Progressing  Goal: Maintains adequate nutritional intake  Description: INTERVENTIONS:- Monitor percentage of each meal consumed- Identify factors contributing to decreased intake, treat as appropriate- Assist with meals as needed- Monitor I&O, weight, and lab values if indicated- Obtain nutrition services referral as needed  Outcome: Progressing  Goal: Establish and maintain optimal ostomy function  Description: INTERVENTIONS:- Assess bowel function- Encourage oral fluids to ensure adequate hydration- Administer IV fluids if ordered to ensure adequate hydration - Administer ordered medications as needed- Encourage mobilization and activity- Nutrition services referral to assist patient with appropriate food choices- Assess stoma site- Consider wound care consult   Outcome: Progressing  Goal: Oral mucous membranes  remain intact  Description: INTERVENTIONS- Assess oral mucosa and hygiene practices- Implement preventative oral hygiene regimen- Implement oral medicated treatments as ordered- Initiate Nutrition services referral as needed  Outcome: Progressing     Problem: METABOLIC, FLUID AND ELECTROLYTES - ADULT  Goal: Electrolytes maintained within normal limits  Description: INTERVENTIONS:- Monitor labs and assess patient for signs and symptoms of electrolyte imbalances- Administer electrolyte replacement as ordered- Monitor response to electrolyte replacements, including repeat lab results as appropriate- Instruct patient on fluid and nutrition as appropriate  Outcome: Progressing  Goal: Fluid balance maintained  Description: INTERVENTIONS:- Monitor labs - Monitor I/O and WT- Instruct patient on fluid and nutrition as appropriate- Assess for signs & symptoms of volume excess or deficit  Outcome: Progressing  Goal: Glucose maintained within target range  Description: INTERVENTIONS:- Monitor Blood Glucose as ordered- Assess for signs and symptoms of hyperglycemia and hypoglycemia- Administer ordered medications to maintain glucose within target range- Assess nutritional intake and initiate nutrition service referral as needed  Outcome: Progressing

## 2025-04-23 NOTE — WOUND OSTOMY CARE
Consult Note - Wound   Mattie A Nichole 80 y.o. female MRN: [id number]  Unit/Bed#: [place] [address] -01 Encounter: [id number]      History and Present Illness:  80 year old female presented to the hospital with abdominal pain after her ahn catheter fell out while having a BM.  Patient's history significant for HTN, COPD, CKD, DM, chronic back pain, CHF.    Assessment Findings:   Patient agreeable to assessment.  She requires moderate assist to turn in bed.  Ahn catheter in place.  Nutrition team following, currently NPO.   Bilateral heels intact and blanchable.  Buttocks and sacrum intact with scattered, blanchable, pink, scar tissue.  Bilateral groin and abdominal folds--pink, with no open areas.  No foul odor appreciated.  Possible beginning of satellite lesions.  Recommend Nystatin powder twice daily.  Bilateral lower extremities--intact, pink, with scattered scaling and brown eschars.  Left open to air.  Recommend frequent moisturizing.        Skin Care Plan:   1-Apply silicone bordered foam dressing to sacrum for prevention.  [place] with P.  Peel back at least daily for skin assessment and re-apply.  Change dressing every other day and as needed.  2-Elevate heels off of bed/chair surface to offload pressure.  3-Offloading air cushion in chair when out of bed.  4-Apply moisturizing skin cream to body daily and as needed.  5-Turn/reposition every 2 hours for pressure re-distribution on skin.   6-Bilateral lower legs, feet, and heels--apply silicone cream two times daily and as needed.  7-Abdominal and groin folds--cleanse with soap and water, pat dry.  Apply Nystatin powder twice daily.    Wound care team will sign-off.  Plan of care reviewed with primary RN.    Michell Ennis RN, BSN, CWON

## 2025-04-23 NOTE — DISCHARGE INSTR - OTHER ORDERS
Skin Care Plan:   1-Apply Remedy silicone cream to bilateral buttocks and sacrum twice daily and as needed for skin protection.  2-Elevate heels off of bed/chair surface to offload pressure.  3-Offloading air cushion in chair when out of bed.  4-Apply lotion to body daily and as needed.  5-Turn/reposition every 2 hours for pressure re-distribution on skin.   6-Bilateral lower legs, feet, and heels--apply Remedy silicone cream two times daily and as needed.  7-Abdominal and groin folds--cleanse with soap and water, pat dry.  Apply Nystatin powder twice daily.

## 2025-04-23 NOTE — ED PROVIDER NOTES
"Time reflects when diagnosis was documented in both MDM as applicable and the Disposition within this note       Time User Action Codes Description Comment    4/22/2025 11:07 PM Chapman, Gail Add [K31.1] Gastric outlet obstruction     4/22/2025 11:07 PM Gail Chapman Add [T83.9XXA] Problem with Bateman catheter, initial encounter (HCC)           ED Disposition       ED Disposition   Admit    Condition   Stable    Date/Time   Tue Apr 22, 2025 11:07 PM    Comment   Case was discussed with GREG and the patient's admission status was agreed to be Admission Status: inpatient status to the service of Dr. Carballo.               Assessment & Plan       Medical Decision Making      Patient was noting to ED for evaluation of Bateman malfunction.  Patient states Bateman fell out while she attempted to have a bowel movement this evening.  Patient did have bowel movement without difficulty.  Upon examination patient does have a very distended upper abdomen, she denies any tenderness upon palpation.  She states that she does have some abdominal cramping but this started after her Bateman fell out.  Plan to replace Bateman.  Given significant distention to the abdomen will order CT abdomen pelvis for evaluation of acute abdominal pelvic abnormalities.  Will also get baseline labs of CBC and a CMP for evaluation of anemia leukocytosis and LFTs, kidney function and electrolyte abnormalities.    Mildly bumped creatinine at 1.7, does not meet criteria for SADAF.  CT: Marked distention of the stomach with gas and ingested material, correlate for gastric outlet obstruction. The small and large bowel are normal in caliber. Consulted general surgery. General Surgery resident Dr. Goodson reports \"Given her complex medical history and new SADAF with history of recent hospitalizations for heart failure exacerbations, would recommend admission to the medicine service with surgical consultation. We will plan on placing an NG tube and consulting GI but no " "acute surgical interventions on our end\"    At the time of admission, the patient is in no acute distress. I discussed with the patient the diagnosis, treatment plan, and plan for admission; pt was given the opportunity to ask questions and verbalized understanding. The patient agrees with the plan of care and admission at this time.    Problems Addressed:  Gastric outlet obstruction: acute illness or injury  Problem with Ahn catheter, initial encounter (HCC): acute illness or injury    Amount and/or Complexity of Data Reviewed  Labs: ordered. Decision-making details documented in ED Course.  Radiology: ordered. Decision-making details documented in ED Course.    Risk  Prescription drug management.  Decision regarding hospitalization.        ED Course as of 04/22/25 2355   Tue Apr 22, 2025 2101 Creatinine(!): 1.71  Slightly elevated from baseline   2205 CT abdomen pelvis wo contrast  IMPRESSION:     Marked distention of the stomach with gas and ingested material, correlate for gastric outlet obstruction. The small and large bowel are normal in caliber.   2309 Will premedicate with valium for NGT       Medications   diazepam (VALIUM) injection 2.5 mg (2.5 mg Intravenous Given 4/22/25 2322)       ED Risk Strat Scores                    No data recorded                            History of Present Illness       Chief Complaint   Patient presents with    Urinary Catheter Problem     Pt resides at [facility].  Pt states she was having a bowel movement and her urinary catheter came out. Pt reports abd pain after the ahn fell out.  Pt denies n/v/d.        Past Medical History:   Diagnosis Date    Acid reflux     Acute kidney injury (HCC) 06/18/2022    Anemia     hx of iron-deficient    Anxiety     Arthritis     Asthma     last needed inhaler last year 2020    Basal cell carcinoma     upper lip    Chronic narcotic dependence (HCC)     Chronic pain     Colitis 11/19/2024    Colon polyp     Cystocele     Diabetes mellitus " (HCC)     stable    Disease of thyroid gland     hypothyroidism    Diverticulosis     Dizziness     at times    Dysfunctional uterine bleeding     last assessed - [id number]    Dysphagia     Fibromyalgia     Gastric ulcer     Gastroparesis     History of colonic polyps     last assessed - [id number]    History of gastroesophageal reflux (GERD)     Hypercholesterolemia     Hyperlipidemia     Hypertension     Hyponatremia 01/13/2024    IBS (irritable bowel syndrome)     Pneumobilia 06/18/2022    Post laminectomy syndrome     S/P insertion of spinal cord stimulator 07/18/2018    s/p [employer] loop recorder 3/2/2024 03/02/2024    Seasonal allergies     Shortness of breath     exertional    Spinal stenosis     Status post lumbar spinal fusion 03/16/2018    Stroke ([facility])     pt states slight stroke March 2022      Past Surgical History:   Procedure Laterality Date    APPENDECTOMY      BACK SURGERY      BREAST CYST EXCISION Left     CARDIAC CATHETERIZATION  11/14/2023    Procedure: Cardiac catheterization;  Surgeon: Randolph Holt MD;  [facility];  Service: Cardiology    CARDIAC CATHETERIZATION N/A 11/14/2023    Procedure: Cardiac Coronary Angiogram;  Surgeon: Randolph Holt MD;  [facility];  Service: Cardiology    CARDIAC ELECTROPHYSIOLOGY PROCEDURE N/A 3/2/2024    Procedure: Cardiac loop recorder implant;  Surgeon: Edu Fontaine MD;  [facility]: [facility];  Service: Cardiology    CHOLECYSTECTOMY      COLONOSCOPY      ESOPHAGOGASTRODUODENOSCOPY N/A 09/28/2016    Procedure: ESOPHAGOGASTRODUODENOSCOPY (EGD);  Surgeon: Mylene Moeller MD;  Location: [facility] GI LAB;  Service:     HERNIA REPAIR      HYSTERECTOMY      TTAH-BSO age 28    LAMINECTOMY      LUMBAR LAMINECTOMY      OOPHORECTOMY      age 28    CT ARTHRODESIS POSTERIOR/PSTLAT TQ 1NTRSPC THORACIC N/A 06/04/2018    Procedure: Reopening of lumbar incision for T12-L5 posterior instrumented fixation and fusion and T12-L4 posterior  decompression;  Surgeon: Wood Rogel MD;  Location: [facility] MAIN [facility];  Service: Neurosurgery    WV COLONOSCOPY FLX DX W/COLLJ SPEC WHEN PFRMD N/A 2016    Procedure: EGD AND COLONOSCOPY;  Surgeon: Mylene Moeller MD;  Location: [facility] GI [facility];  Service: Gastroenterology    WV DILATION ESOPH UNGUIDED SOUND/BOUGIE 1/MULT PASS N/A 2016    Procedure: DILATATION ESOPHAGEAL;  Surgeon: Mylene Moeller MD;  Location: [facility];  Service: Gastroenterology    WV ESOPHAGOGASTRODUODENOSCOPY TRANSORAL DIAGNOSTIC N/A 2016    Procedure: ESOPHAGOGASTRODUODENOSCOPY (EGD);  Surgeon: Mylene Moeller MD;  Location: [facility];  Service: Gastroenterology    WV INSJ/RPLCMT SPINAL NPG/RCVR POCKET CRTJ&CONNJ Left 2018    Procedure: removal of left buttock implantable pulse generator and placement of new  implantable pulse generator;  Surgeon: Wood Rogel MD;  Location: [facility];  Service: Neurosurgery      Family History   Problem Relation Age of Onset    Lung cancer Mother 46    Pulmonary embolism Father     No Known Problems Sister     No Known Problems Daughter     No Known Problems Daughter     Stroke Maternal Grandmother     Heart attack Maternal Grandfather     No Known Problems Paternal Grandmother     No Known Problems Paternal Grandfather     No Known Problems Maternal Aunt     Diabetes Family         Diabetes mellitus    Hypertension Family     Stroke Family         Stroke complications      Social History     Tobacco Use    Smoking status: Former     Current packs/day: 0.00     Types: Cigarettes     Quit date: 1970     Years since quittin.3    Smokeless tobacco: Never    Tobacco comments:     Denied history of current ever day smoker, Former smoker and Never smoker all documented in [employer]   Vaping Use    Vaping status: Never Used   Substance Use Topics    Alcohol use: Not Currently     Comment: Denied history of alcohol use    Drug use: No     Comment: Denied history of drug use      E-Cigarette/Vaping     E-Cigarette Use Never User       E-Cigarette/Vaping Substances    Nicotine No     THC No     CBD No     Flavoring No     Other No     Unknown No       I have reviewed and agree with the history as documented.     This is an 80-year-old female with history of acid reflux, DM2, hypothyroidism, gastroparesis, HLD, HTN, CVA, appendectomy, cholecystectomy, hysterectomy, spinal stimulator placement who presents to the ED for evaluation of Bateman malfunction.  Patient reports while attempting to have a bowel movement this evening her Bateman fell out.  She states prior to this she did not have any abdominal discomfort but afterwards she did have some cramping.  She states that her Bateman was changed about a month ago at urology.  She does have some abdominal cramping at this time, states that she had some nausea and increased belching throughout the day.  She states that she is able to have bowel movements and pass gas without any difficulty.  She denies any fevers, chills, vomiting or diarrhea.      History provided by:  Patient   used: No        Review of Systems   Constitutional:  Negative for chills and fever.   Gastrointestinal:  Positive for abdominal pain and nausea. Negative for constipation, diarrhea and vomiting.   All other systems reviewed and are negative.          Objective       ED Triage Vitals [04/22/25 1950]   Temperature Pulse Blood Pressure Respirations SpO2 Patient Position - Orthostatic VS   98.9 °F (37.2 °C) 91 140/65 16 96 % Sitting      Temp Source Heart Rate Source BP Location FiO2 (%) Pain Score    Oral Monitor Right arm -- --      Vitals      Date and Time Temp Pulse SpO2 Resp BP Pain Score FACES Pain Rating User   04/22/25 2300 -- 93 97 % 18 148/64 -- -- [place]   04/22/25 2215 -- 90 96 % 16 125/61 -- -- [place]   04/22/25 1950 98.9 °F (37.2 °C) 91 96 % 16 140/65 -- -- [place]            Physical Exam  Vitals reviewed.   Constitutional:       General: She is not in acute distress.      Appearance: Normal appearance. She is well-developed and well-groomed. She is not ill-appearing.   HENT:      Head: Normocephalic and atraumatic.      Right Ear: External ear normal.      Left Ear: External ear normal.      Nose: Nose normal.   Eyes:      General: No scleral icterus.     Conjunctiva/sclera: Conjunctivae normal.   Cardiovascular:      Rate and Rhythm: Normal rate and regular rhythm.   Pulmonary:      Effort: Pulmonary effort is normal.      Breath sounds: No stridor.   Abdominal:      General: Abdomen is protuberant. There is distension.      Palpations: Abdomen is soft.      Tenderness: There is no abdominal tenderness. There is no right CVA tenderness, left CVA tenderness, guarding or rebound.      Comments: Distended upper abdomen   Musculoskeletal:         General: No deformity. Normal range of motion.      Cervical back: Normal range of motion.   Skin:     Coloration: Skin is not jaundiced or pale.      Findings: No lesion or rash.   Neurological:      Mental Status: She is alert and oriented to person, place, and time.   Psychiatric:         Mood and Affect: Mood normal.         Behavior: Behavior normal. Behavior is cooperative.         Results Reviewed       Procedure Component Value Units Date/Time    Comprehensive metabolic panel [[id number]]  (Abnormal) Collected: 04/22/25 2036    Lab Status: Final result Specimen: Blood from Arm, Left Updated: 04/22/25 2101     Sodium 136 mmol/L      Potassium 5.0 mmol/L      Chloride 95 mmol/L      CO2 34 mmol/L      ANION GAP 7 mmol/L      BUN 19 mg/dL      Creatinine 1.71 mg/dL      Glucose 168 mg/dL      Calcium 9.0 mg/dL      AST 28 U/L      ALT 11 U/L      Alkaline Phosphatase 132 U/L      Total Protein 6.8 g/dL      Albumin 3.7 g/dL      Total Bilirubin 0.31 mg/dL      eGFR 27 ml/min/1.73sq m     Narrative:      National Kidney Disease Foundation guidelines for Chronic Kidney Disease (CKD):     Stage 1 with normal or high GFR (GFR > 90 mL/min/1.73  square meters)    Stage 2 Mild CKD (GFR = 60-89 mL/min/1.73 square meters)    Stage 3A Moderate CKD (GFR = 45-59 mL/min/1.73 square meters)    Stage 3B Moderate CKD (GFR = 30-44 mL/min/1.73 square meters)    Stage 4 Severe CKD (GFR = 15-29 mL/min/1.73 square meters)    Stage 5 End Stage CKD (GFR <15 mL/min/1.73 square meters)  Note: GFR calculation is accurate only with a steady state creatinine    CBC and differential [[id number]]  (Abnormal) Collected: 04/22/25 2036    Lab Status: Final result Specimen: Blood from Arm, Left Updated: 04/22/25 2040     WBC 7.79 Thousand/uL      RBC 3.77 Million/uL      Hemoglobin 10.6 g/dL      Hematocrit 33.5 %      MCV 89 fL      MCH 28.1 pg      MCHC 31.6 g/dL      RDW 14.7 %      MPV 9.8 fL      Platelets 284 Thousands/uL      nRBC 0 /100 WBCs      Segmented % 59 %      Immature Grans % 0 %      Lymphocytes % 25 %      Monocytes % 10 %      Eosinophils Relative 5 %      Basophils Relative 1 %      Absolute Neutrophils 4.70 Thousands/µL      Absolute Immature Grans 0.02 Thousand/uL      Absolute Lymphocytes 1.91 Thousands/µL      Absolute Monocytes 0.74 Thousand/µL      Eosinophils Absolute 0.37 Thousand/µL      Basophils Absolute 0.05 Thousands/µL             CT abdomen pelvis wo contrast   Final Interpretation by Kermit Link MD (04/22 2157)      Marked distention of the stomach with gas and ingested material, correlate for gastric outlet obstruction. The small and large bowel are normal in caliber.      Workstation performed: [id number]         XR chest portable    (Results Pending)       Procedures    ED Medication and Procedure Management   Prior to Admission Medications   Prescriptions Last Dose Informant Patient Reported? Taking?   Blood Glucose Monitoring Suppl (OneTouch Verio Reflect) w/Device KIT  Self No Yes   Sig: Check blood sugars twice daily. Please substitute with appropriate alternative as covered by patient's insurance. Dx: E11.65   Cholecalciferol (True Vitamin  D3) 1.25 MG (26988 UT) capsule   Yes Yes   Sig: Take 50,000 Units by mouth daily   Lidocaine 4 % PTCH  Self Yes Yes   Sig: Apply 4 % topically Apply 1 every morning to back and remove at bedtime   MAGNESIUM OXIDE 400 PO  Self Yes Yes   Sig: Take 400 mg by mouth 2 (two) times a day   Milnacipran HCl (Savella) 100 MG TABS  Self No Yes   Sig: Take 1 tablet (100 mg total) by mouth daily   OneTouch Delica Lancets 33G MISC  Self No Yes   Sig: Check blood sugars twice daily. Please substitute with appropriate alternative as covered by patient's insurance. Dx: E11.65   acetaminophen (TYLENOL) 325 mg tablet  Self No Yes   Sig: Take 3 tablets (975 mg total) by mouth every 8 (eight) hours   albuterol (PROVENTIL HFA,VENTOLIN HFA) 90 mcg/act inhaler  Self No Yes   Sig: Inhale 2 puffs every 6 (six) hours as needed for wheezing or shortness of breath   aspirin 81 mg chewable tablet  Self Yes Yes   Sig: Chew 81 mg daily   atorvastatin (LIPITOR) 40 mg tablet  Self No Yes   Sig: TAKE ONE TABLET BY MOUTH ONCE DAILY   docusate sodium (COLACE) 100 mg capsule  Self No Yes   Sig: Take 1 capsule (100 mg total) by mouth 2 (two) times a day   ergocalciferol (VITAMIN D2) 50,000 units  Self Yes Yes   Sig: Take 50,000 Units by mouth Take I cap orally once monthly   famotidine (PEPCID) 20 mg tablet  Self Yes Yes   Sig: Take 20 mg by mouth 2 (two) times a day 1 tab orally twice daily as needed for heartburn/acid reflux   fentaNYL (DURAGESIC) 75 mcg/hr  Self Yes Yes   Sig: Place 1 patch on the skin every third day Apply 1 patch every 3 days   ferrous sulfate 325 (65 Fe) mg tablet  Self No Yes   Sig: Take 1 tablet (325 mg total) by mouth daily with breakfast   gabapentin (NEURONTIN) 300 mg capsule  Self No Yes   Sig: Take 1 capsule (300 mg total) by mouth daily at bedtime   glucose blood (OneTouch Verio) test strip  Self No Yes   Sig: Check blood sugars twice daily. Please substitute with appropriate alternative as covered by patient's insurance.  Dx: E11.65   latanoprost (XALATAN) 0.005 % ophthalmic solution  Self Yes Yes   Sig: Administer 1 drop to both eyes daily at bedtime   levothyroxine 75 mcg tablet  Self No Yes   Sig: Take 0.5 tablets (37.5 mcg total) by mouth daily in the early morning   lidocaine (LIDODERM) 5 %  Self No Yes   Sig: Apply 1 patch topically over 12 hours daily Remove & Discard patch within 12 hours or as directed by MD   loperamide (IMODIUM A-D) 2 MG tablet  Self Yes Yes   Sig: Take 2 mg by mouth 4 (four) times a day as needed for diarrhea   methocarbamol (ROBAXIN) 750 mg tablet  Self Yes Yes   Sig: Take 750 mg by mouth every 8 (eight) hours 1 tab orally every 8 hours as needed for muscle spasms   metoprolol succinate (TOPROL-XL) 25 mg 24 hr tablet  Self No Yes   Sig: Take 0.5 tablets (12.5 mg total) by mouth daily   morphine (MSIR) 15 mg tablet  Self No Yes   Sig: Take 1 tablet (15 mg total) by mouth every 6 (six) hours Max Daily Amount: 60 mg   ondansetron (ZOFRAN) 4 mg tablet  Self No Yes   Sig: Take 1 tablet (4 mg total) by mouth every 8 (eight) hours as needed for nausea or vomiting   polyethylene glycol (MIRALAX) 17 g packet  Self No Yes   Sig: Take 17 g by mouth daily   potassium chloride (Klor-Con M20) 20 mEq tablet   No Yes   Sig: Take 1 tablet (20 mEq total) by mouth daily   senna (SENOKOT) 8.6 mg  Self No Yes   Sig: Take 2 tablets (17.2 mg total) by mouth every morning   simethicone (MYLICON) 80 mg chewable tablet  Self Yes Yes   Sig: Chew 80 mg every 6 (six) hours as needed for flatulence Chew and swallow 1 tab orally 3 times daily as needed for gas   torsemide (DEMADEX) 10 mg tablet   No No   Sig: Increase to 60 mg BID for 5 days, then decrease to 40 mg BID   torsemide (DEMADEX) 20 mg tablet  Self Yes Yes   Sig: Take 20 mg by mouth if needed (leg swelling)   trospium chloride (SANCTURA) 20 mg tablet  Self No Yes   Sig: Take 1 tablet (20 mg total) by mouth 2 (two) times a day   zolpidem (AMBIEN CR) 6.25 MG CR tablet  Self  Yes Yes   Sig: Take 6.25 mg by mouth daily at bedtime as needed for sleep      Facility-Administered Medications: None     Current Discharge Medication List        CONTINUE these medications which have NOT CHANGED    Details   acetaminophen (TYLENOL) 325 mg tablet Take 3 tablets (975 mg total) by mouth every 8 (eight) hours    Associated Diagnoses: Degenerative lumbar spinal stenosis      albuterol (PROVENTIL HFA,VENTOLIN HFA) 90 mcg/act inhaler Inhale 2 puffs every 6 (six) hours as needed for wheezing or shortness of breath  Qty: 6.7 g, Refills: 5    Comments: Substitution to a formulary equivalent within the same pharmaceutical class is authorized.  Associated Diagnoses: Mild intermittent asthma without complication      aspirin 81 mg chewable tablet Chew 81 mg daily      atorvastatin (LIPITOR) 40 mg tablet TAKE ONE TABLET BY MOUTH ONCE DAILY  Qty: 90 tablet, Refills: 0    Associated Diagnoses: High cholesterol      Blood Glucose Monitoring Suppl (OneTouch Verio Reflect) w/Device KIT Check blood sugars twice daily. Please substitute with appropriate alternative as covered by patient's insurance. Dx: E11.65  Qty: 1 kit, Refills: 0    Associated Diagnoses: Type 2 diabetes mellitus without complication, without long-term current use of insulin ([facility])      Cholecalciferol (True Vitamin D3) 1.25 MG (08240 UT) capsule Take 50,000 Units by mouth daily      docusate sodium (COLACE) 100 mg capsule Take 1 capsule (100 mg total) by mouth 2 (two) times a day    Associated Diagnoses: Constipation, unspecified constipation type      ergocalciferol (VITAMIN D2) 50,000 units Take 50,000 Units by mouth Take I cap orally once monthly      famotidine (PEPCID) 20 mg tablet Take 20 mg by mouth 2 (two) times a day 1 tab orally twice daily as needed for heartburn/acid reflux      fentaNYL (DURAGESIC) 75 mcg/hr Place 1 patch on the skin every third day Apply 1 patch every 3 days      ferrous sulfate 325 (65 Fe) mg tablet Take 1 tablet  (325 mg total) by mouth daily with breakfast  Qty: 90 tablet, Refills: 0    Associated Diagnoses: Anemia, unspecified type      gabapentin (NEURONTIN) 300 mg capsule Take 1 capsule (300 mg total) by mouth daily at bedtime    Associated Diagnoses: Neuropathy      glucose blood (OneTouch Verio) test strip Check blood sugars twice daily. Please substitute with appropriate alternative as covered by patient's insurance. Dx: E11.65  Qty: 200 each, Refills: 3    Associated Diagnoses: Type 2 diabetes mellitus without complication, without long-term current use of insulin ([facility])      latanoprost (XALATAN) 0.005 % ophthalmic solution Administer 1 drop to both eyes daily at bedtime      levothyroxine 75 mcg tablet Take 0.5 tablets (37.5 mcg total) by mouth daily in the early morning    Associated Diagnoses: Hypothyroidism, unspecified type      lidocaine (LIDODERM) 5 % Apply 1 patch topically over 12 hours daily Remove & Discard patch within 12 hours or as directed by MD    Associated Diagnoses: Degenerative lumbar spinal stenosis      Lidocaine 4 % PTCH Apply 4 % topically Apply 1 every morning to back and remove at bedtime      loperamide (IMODIUM A-D) 2 MG tablet Take 2 mg by mouth 4 (four) times a day as needed for diarrhea      MAGNESIUM OXIDE 400 PO Take 400 mg by mouth 2 (two) times a day      methocarbamol (ROBAXIN) 750 mg tablet Take 750 mg by mouth every 8 (eight) hours 1 tab orally every 8 hours as needed for muscle spasms      metoprolol succinate (TOPROL-XL) 25 mg 24 hr tablet Take 0.5 tablets (12.5 mg total) by mouth daily    Associated Diagnoses: CHF exacerbation ([facility]); Chronic heart failure with preserved ejection fraction (HFpEF) ([facility])      Milnacipran HCl (Savella) 100 MG TABS Take 1 tablet (100 mg total) by mouth daily  Qty: 30 tablet, Refills: 3    Associated Diagnoses: Fibromyalgia      morphine (MSIR) 15 mg tablet Take 1 tablet (15 mg total) by mouth every 6 (six) hours Max Daily Amount: 60 mg  Qty: 30  tablet, Refills: 0      ondansetron (ZOFRAN) 4 mg tablet Take 1 tablet (4 mg total) by mouth every 8 (eight) hours as needed for nausea or vomiting  Qty: 20 tablet, Refills: 0    Associated Diagnoses: Prepyloric ulcer, unspecified ulcer chronicity      OneTouch Delica Lancets 33G MISC Check blood sugars twice daily. Please substitute with appropriate alternative as covered by patient's insurance. Dx: E11.65  Qty: 200 each, Refills: 3    Associated Diagnoses: Type 2 diabetes mellitus without complication, without long-term current use of insulin (HCC)      polyethylene glycol (MIRALAX) 17 g packet Take 17 g by mouth daily    Associated Diagnoses: Constipation, unspecified constipation type      potassium chloride (Klor-Con M20) 20 mEq tablet Take 1 tablet (20 mEq total) by mouth daily  Qty: 5 tablet, Refills: 0    Associated Diagnoses: Chronic diastolic congestive heart failure (HCC)      senna (SENOKOT) 8.6 mg Take 2 tablets (17.2 mg total) by mouth every morning    Associated Diagnoses: Constipation, unspecified constipation type      simethicone (MYLICON) 80 mg chewable tablet Chew 80 mg every 6 (six) hours as needed for flatulence Chew and swallow 1 tab orally 3 times daily as needed for gas      !! torsemide (DEMADEX) 20 mg tablet Take 20 mg by mouth if needed (leg swelling)      trospium chloride (SANCTURA) 20 mg tablet Take 1 tablet (20 mg total) by mouth 2 (two) times a day  Qty: 60 tablet, Refills: 6    Associated Diagnoses: Detrusor instability      zolpidem (AMBIEN CR) 6.25 MG CR tablet Take 6.25 mg by mouth daily at bedtime as needed for sleep      !! torsemide (DEMADEX) 10 mg tablet Increase to 60 mg BID for 5 days, then decrease to 40 mg BID    Associated Diagnoses: CHF exacerbation (HCC); Chronic heart failure with preserved ejection fraction (HFpEF) ([facility])       !! - Potential duplicate medications found. Please discuss with provider.        No discharge procedures on file.  ED SEPSIS DOCUMENTATION    Time reflects when diagnosis was documented in both MDM as applicable and the Disposition within this note       Time User Action Codes Description Comment    4/22/2025 11:07 PM Gail Chapman [K31.1] Gastric outlet obstruction     4/22/2025 11:07 PM Gail Chapman [T83.9XXA] Problem with Bateman catheter, initial encounter ([facility])                  Gail Chapman PA-C  04/22/25 1917

## 2025-04-23 NOTE — UTILIZATION REVIEW
NOTIFICATION OF INPATIENT ADMISSION   AUTHORIZATION REQUEST   SERVICING FACILITY:   [place] [address]  Tax ID: [id number]  NPI: [id number] ATTENDING PROVIDER:  Attending Name and NPI#: Khurram Ceron Md [[id number]]  Address: [address] [place] [address]  Phone: [phone]     ADMISSION INFORMATION:  Place of Service: Inpatient [facility]  Place of Service Code: 21  Inpatient Admission Date/Time: 4/22/25 11:52 PM  Discharge Date/Time: No discharge date for patient encounter.  Admitting Diagnosis Code/Description:  Gastric outlet obstruction [K31.1]  Problem with Bateman catheter, initial encounter (HCC) [T83.9XXA]  Problem with urinary catheter (HCC) [T83.9XXA]     UTILIZATION REVIEW CONTACT:  Lorraine Carter Utilization   Network Utilization Review Department  Phone: [phone]  Fax [phone]  Email: [email or web address]  Contact for approvals/pending authorizations, clinical reviews, and discharge.     PHYSICIAN ADVISORY SERVICES:  Medical Necessity Denial & Vaiy-nv-Dpqw Review  Phone: [phone]  Fax: [phone]  Email: [email or web address]     DISCHARGE SUPPORT TEAM:  For Patients Discharge Needs & Updates  Phone: [phone] opt. [phone] Fax: [phone]  Email: [email or web address]

## 2025-04-23 NOTE — CONSULTS
Consultation - Surgery-General   Name: Mattie Varela 80 y.o. female I MRN: [id number]  Unit/Bed#: ED-25 I Date of Admission: 4/22/2025   Date of Service: 4/22/2025 I Hospital Day: 0   Inpatient consult to Acute Care Surgery  Consult performed by: Naveed Goodson MD  Consult ordered by: Gail Chapman PA-C        Physician Requesting Evaluation: Eryn Abdi DO   Reason for Evaluation / Principal Problem: Concern for gastric outlet obstruction    Assessment & Plan  Gastric outlet obstruction  Patient is an 80-year-old female with past medical history of CVA, hypertension, HFpEF, CAD, CKD, type 2 diabetes who presented with CT findings demonstrating marked distention of the stomach with gas and ingested material concerning for gastric outlet obstruction.    - No acute surgical interventions  - Recommend admission to the medicine service  - Will place NG tube due to risk of vomiting and aspiration with severe gastric distention  - N.p.o.  - Plan for GI consultation for possible EGD to determine source of obstruction.  - Pain and nausea control as needed  - DVT ppx  - Rest of care per primary team  SADAF (acute kidney injury) (HCC)  Patient appears to have SADAF on CKD with a creatinine of 1.71 which appears elevated from baseline of 1.4-1.5.    -Recommend IV fluid resuscitation  - Care per primary team    Please contact the SecureChat role for the Surgery-General service with any questions/concerns.    History of Present Illness   Mattie Varela is a 80 y.o. female with past medical history of CVA, HTN, HFpEF, CAD, CKD, type 2 diabetes who presented today because her chronic Bateman catheter fell out while having a bowel movement.  She did report that she had epigastric abdominal pain at that time that has persisted since then.  She denies any nausea, vomiting, fever, chills, chest pain, shortness of breath.  Last meal was around lunch today which she tolerated well.  She is having bowel movements and passing  flatus.  She denies any previous history of gastric outlet obstructions or small bowel obstructions.  She does have a surgical history of a laparoscopic cholecystectomy, laparoscopic appendectomy, and partial hysterectomy.    CT demonstrates marked distention of the stomach with gas and ingested material.  It also appears that there is some distention of the duodenum as well.  Labs are significant for white blood cell count 7.79, hemoglobin 10.6 which appears baseline, creatinine 1.71 which appears elevated from baseline of 1.4-1.5.    Review of Systems   Constitutional:  Negative for chills and fever.   HENT:  Negative for trouble swallowing.    Respiratory:  Negative for shortness of breath.    Cardiovascular:  Negative for chest pain.   Gastrointestinal:  Positive for abdominal pain. Negative for nausea and vomiting.   Skin:  Negative for color change.   Neurological:  Negative for speech difficulty.   Psychiatric/Behavioral:  Negative for behavioral problems.      Medical History Review: I have reviewed the patient's PMH, PSH, Social History, Family History, Meds, and Allergies     Objective :  Temp:  [98.9 °F (37.2 °C)] 98.9 °F (37.2 °C)  HR:  [90-91] 90  BP: (125-140)/(61-65) 125/61  Resp:  [16] 16  SpO2:  [96 %] 96 %  O2 Device: None (Room air)    Lines/Drains/Airways       Active Status       Name Placement date Placement time Site Days    Urethral Catheter Latex 16 Fr. 04/22/25 2027  Latex  less than 1                  Physical Exam  Vitals reviewed.   Constitutional:       Appearance: Normal appearance.   HENT:      Head: Normocephalic and atraumatic.      Right Ear: External ear normal.      Left Ear: External ear normal.      Nose: Nose normal.   Eyes:      Conjunctiva/sclera: Conjunctivae normal.   Cardiovascular:      Rate and Rhythm: Normal rate.      Comments: Appears well perfused  Pulmonary:      Effort: Pulmonary effort is normal. No respiratory distress.   Abdominal:      General: There is  distension (Significant upper abdominal distention).      Palpations: Abdomen is soft.      Tenderness: There is abdominal tenderness (Mild epigastric tenderness). There is no guarding or rebound.      Comments: No evidence of peritonitis   Skin:     General: Skin is warm and dry.   Neurological:      Mental Status: She is alert and oriented to person, place, and time.   Psychiatric:         Mood and Affect: Mood normal.         Behavior: Behavior normal.         Lab Results: I have reviewed the following results:  Recent Labs     04/22/25 2036   WBC 7.79   HGB 10.6*   HCT 33.5*      SODIUM 136   K 5.0   CL 95*   CO2 34*   BUN 19   CREATININE 1.71*   GLUC 168*   AST 28   ALT 11   ALB 3.7   TBILI 0.31   ALKPHOS 132*       Imaging Results Review: I reviewed radiology reports from this admission including: CT abdomen/pelvis.  Other Study Results Review: No additional pertinent studies reviewed.

## 2025-04-24 ENCOUNTER — APPOINTMENT (INPATIENT)
Dept: RADIOLOGY | Facility: HOSPITAL | Age: 80
End: 2025-04-24
Payer: MEDICARE

## 2025-04-24 ENCOUNTER — PREP FOR PROCEDURE (OUTPATIENT)
Dept: GASTROENTEROLOGY | Facility: CLINIC | Age: 80
End: 2025-04-24

## 2025-04-24 ENCOUNTER — TELEPHONE (OUTPATIENT)
Dept: GASTROENTEROLOGY | Facility: CLINIC | Age: 80
End: 2025-04-24

## 2025-04-24 DIAGNOSIS — R93.3 ABNORMAL CT SCAN, STOMACH: Primary | ICD-10-CM

## 2025-04-24 DIAGNOSIS — K21.9 CHRONIC GERD: ICD-10-CM

## 2025-04-24 DIAGNOSIS — R68.81 EARLY SATIETY: ICD-10-CM

## 2025-04-24 DIAGNOSIS — R14.2 BURPING: ICD-10-CM

## 2025-04-24 LAB
GLUCOSE SERPL-MCNC: 143 MG/DL (ref 65–140)
GLUCOSE SERPL-MCNC: 147 MG/DL (ref 65–140)
GLUCOSE SERPL-MCNC: 164 MG/DL (ref 65–140)
GLUCOSE SERPL-MCNC: 169 MG/DL (ref 65–140)
GLUCOSE SERPL-MCNC: 177 MG/DL (ref 65–140)
GLUCOSE SERPL-MCNC: 55 MG/DL (ref 65–140)

## 2025-04-24 PROCEDURE — 74018 RADEX ABDOMEN 1 VIEW: CPT

## 2025-04-24 PROCEDURE — NC001 PR NO CHARGE: Performed by: SURGERY

## 2025-04-24 PROCEDURE — 82948 REAGENT STRIP/BLOOD GLUCOSE: CPT

## 2025-04-24 PROCEDURE — 99232 SBSQ HOSP IP/OBS MODERATE 35: CPT | Performed by: INTERNAL MEDICINE

## 2025-04-24 RX ORDER — ZOLPIDEM TARTRATE 5 MG/1
5 TABLET ORAL
Status: DISCONTINUED | OUTPATIENT
Start: 2025-04-24 | End: 2025-04-24

## 2025-04-24 RX ORDER — SODIUM CHLORIDE, SODIUM LACTATE, POTASSIUM CHLORIDE, CALCIUM CHLORIDE 600; 310; 30; 20 MG/100ML; MG/100ML; MG/100ML; MG/100ML
125 INJECTION, SOLUTION INTRAVENOUS CONTINUOUS
OUTPATIENT
Start: 2025-04-24

## 2025-04-24 RX ORDER — DEXTROSE MONOHYDRATE 25 G/50ML
INJECTION, SOLUTION INTRAVENOUS
Status: COMPLETED
Start: 2025-04-24 | End: 2025-04-24

## 2025-04-24 RX ORDER — METHOCARBAMOL 750 MG/1
750 TABLET, FILM COATED ORAL EVERY 8 HOURS SCHEDULED
Status: DISCONTINUED | OUTPATIENT
Start: 2025-04-24 | End: 2025-04-25 | Stop reason: HOSPADM

## 2025-04-24 RX ADMIN — DEXTROSE MONOHYDRATE 50 ML: 25 INJECTION, SOLUTION INTRAVENOUS at 06:44

## 2025-04-24 RX ADMIN — DEXTROSE MONOHYDRATE 50 ML: 25 INJECTION, SOLUTION INTRAVENOUS at 00:00

## 2025-04-24 RX ADMIN — ZOLPIDEM TARTRATE 5 MG: 5 TABLET, FILM COATED ORAL at 21:15

## 2025-04-24 RX ADMIN — HYDROMORPHONE HYDROCHLORIDE 0.5 MG: 1 INJECTION, SOLUTION INTRAMUSCULAR; INTRAVENOUS; SUBCUTANEOUS at 04:00

## 2025-04-24 RX ADMIN — MORPHINE SULFATE 15 MG: 15 TABLET ORAL at 13:38

## 2025-04-24 RX ADMIN — GABAPENTIN 300 MG: 300 CAPSULE ORAL at 21:10

## 2025-04-24 RX ADMIN — METHOCARBAMOL 750 MG: 750 TABLET ORAL at 15:46

## 2025-04-24 RX ADMIN — SENNOSIDES 8.6 MG: 8.6 TABLET, FILM COATED ORAL at 17:28

## 2025-04-24 RX ADMIN — SENNOSIDES 8.6 MG: 8.6 TABLET, FILM COATED ORAL at 08:37

## 2025-04-24 RX ADMIN — NYSTATIN: 100000 POWDER TOPICAL at 08:38

## 2025-04-24 RX ADMIN — OXYBUTYNIN CHLORIDE 5 MG: 5 TABLET ORAL at 17:28

## 2025-04-24 RX ADMIN — LATANOPROST 1 DROP: 50 SOLUTION OPHTHALMIC at 21:11

## 2025-04-24 RX ADMIN — IOHEXOL 120 ML: 350 INJECTION, SOLUTION INTRAVENOUS at 10:30

## 2025-04-24 RX ADMIN — HYDROMORPHONE HYDROCHLORIDE 0.5 MG: 1 INJECTION, SOLUTION INTRAMUSCULAR; INTRAVENOUS; SUBCUTANEOUS at 12:07

## 2025-04-24 RX ADMIN — ACETAMINOPHEN 1000 MG: 10 INJECTION INTRAVENOUS at 10:24

## 2025-04-24 RX ADMIN — METHOCARBAMOL 750 MG: 750 TABLET ORAL at 21:10

## 2025-04-24 RX ADMIN — HEPARIN SODIUM 5000 UNITS: 5000 INJECTION INTRAVENOUS; SUBCUTANEOUS at 04:01

## 2025-04-24 RX ADMIN — HEPARIN SODIUM 5000 UNITS: 5000 INJECTION INTRAVENOUS; SUBCUTANEOUS at 21:10

## 2025-04-24 RX ADMIN — HYDROMORPHONE HYDROCHLORIDE 0.5 MG: 1 INJECTION, SOLUTION INTRAMUSCULAR; INTRAVENOUS; SUBCUTANEOUS at 09:33

## 2025-04-24 RX ADMIN — HEPARIN SODIUM 5000 UNITS: 5000 INJECTION INTRAVENOUS; SUBCUTANEOUS at 13:39

## 2025-04-24 RX ADMIN — MORPHINE SULFATE 15 MG: 15 TABLET ORAL at 19:25

## 2025-04-24 RX ADMIN — DEXTROSE, SODIUM CHLORIDE, SODIUM LACTATE, POTASSIUM CHLORIDE, AND CALCIUM CHLORIDE 50 ML/HR: 5; .6; .31; .03; .02 INJECTION, SOLUTION INTRAVENOUS at 00:18

## 2025-04-24 NOTE — PLAN OF CARE
Problem: Prexisting or High Potential for Compromised Skin Integrity  Goal: Skin integrity is maintained or improved  Description: INTERVENTIONS:- Identify patients at risk for skin breakdown- Assess and monitor skin integrity- Assess and monitor nutrition and hydration status- Monitor labs - Assess for incontinence - Turn and reposition patient- Assist with mobility/ambulation- Relieve pressure over bony prominences- Avoid friction and shearing- Provide appropriate hygiene as needed including keeping skin clean and dry- Evaluate need for skin moisturizer/barrier cream- Collaborate with interdisciplinary team - Patient/family teaching- Consider wound care consult   Outcome: Progressing     Problem: PAIN - ADULT  Goal: Verbalizes/displays adequate comfort level or baseline comfort level  Description: Interventions:- Encourage patient to monitor pain and request assistance- Assess pain using appropriate pain scale- Administer analgesics based on type and severity of pain and evaluate response- Implement non-pharmacological measures as appropriate and evaluate response- Consider cultural and social influences on pain and pain management- Notify physician/advanced practitioner if interventions unsuccessful or patient reports new pain  Outcome: Progressing     Problem: INFECTION - ADULT  Goal: Absence or prevention of progression during hospitalization  Description: INTERVENTIONS:- Assess and monitor for signs and symptoms of infection- Monitor lab/diagnostic results- Monitor all insertion sites, i.e. indwelling lines, tubes, and drains- Monitor endotracheal if appropriate and nasal secretions for changes in amount and color- [place] appropriate cooling/warming therapies per order- Administer medications as ordered- Instruct and encourage patient and family to use good hand hygiene technique- Identify and instruct in appropriate isolation precautions for identified infection/condition  Outcome: Progressing  Goal:  Absence of fever/infection during neutropenic period  Description: INTERVENTIONS:- Monitor WBC  Outcome: Progressing     Problem: SAFETY ADULT  Goal: Patient will remain free of falls  Description: INTERVENTIONS:- Educate patient/family on patient safety including physical limitations- Instruct patient to call for assistance with activity - Consult OT/PT to assist with strengthening/mobility - Keep Call bell within reach- Keep bed low and locked with side rails adjusted as appropriate- Keep care items and personal belongings within reach- Initiate and maintain comfort rounds- Make Fall Risk Sign visible to staff- Offer Toileting every 2 Hours, in advance of need- Initiate/Maintain bed alarm- Obtain necessary fall risk management equipment: bed alarm- Apply yellow socks and bracelet for high fall risk patients- Consider moving patient to room near nurses station  Outcome: Progressing  Goal: Maintain or return to baseline ADL function  Description: INTERVENTIONS:-  Assess patient's ability to carry out ADLs; assess patient's baseline for ADL function and identify physical deficits which impact ability to perform ADLs (bathing, care of mouth/teeth, toileting, grooming, dressing, etc.)- Assess/evaluate cause of self-care deficits - Assess range of motion- Assess patient's mobility; develop plan if impaired- Assess patient's need for assistive devices and provide as appropriate- Encourage maximum independence but intervene and supervise when necessary- Involve family in performance of ADLs- Assess for home care needs following discharge - Consider OT consult to assist with ADL evaluation and planning for discharge- Provide patient education as appropriate  Outcome: Progressing  Goal: Maintains/Returns to pre admission functional level  Description: INTERVENTIONS:- Perform AM-PAC 6 Click Basic Mobility/ Daily Activity assessment daily.- Set and communicate daily mobility goal to care team and patient/family/caregiver. -  Collaborate with rehabilitation services on mobility goals if consulted- Perform Range of Motion 4 times a day.- Reposition patient every 2 hours.- Dangle patient 3 times a day- Stand patient 3 times a day- Ambulate patient 3 times a day- Out of bed to chair 3 times a day - Out of bed for meals 3 times a day- Out of bed for toileting- Record patient progress and toleration of activity level   Outcome: Progressing     Problem: DISCHARGE PLANNING  Goal: Discharge to home or other facility with appropriate resources  Description: INTERVENTIONS:- Identify barriers to discharge w/patient and caregiver- Arrange for needed discharge resources and transportation as appropriate- Identify discharge learning needs (meds, wound care, etc.)- Arrange for interpretive services to assist at discharge as needed- Refer to Case Management Department for coordinating discharge planning if the patient needs post-hospital services based on physician/advanced practitioner order or complex needs related to functional status, cognitive ability, or social support system  Outcome: Progressing     Problem: Knowledge Deficit  Goal: Patient/family/caregiver demonstrates understanding of disease process, treatment plan, medications, and discharge instructions  Description: Complete learning assessment and assess knowledge base.Interventions:- Provide teaching at level of understanding- Provide teaching via preferred learning methods  Outcome: Progressing     Problem: RESPIRATORY - ADULT  Goal: Achieves optimal ventilation and oxygenation  Description: INTERVENTIONS:- Assess for changes in respiratory status- Assess for changes in mentation and behavior- Position to facilitate oxygenation and minimize respiratory effort- Oxygen administered by appropriate delivery if ordered- Initiate smoking cessation education as indicated- Encourage broncho-pulmonary hygiene including cough, deep breathe, Incentive Spirometry- Assess the need for suctioning and  aspirate as needed- Assess and instruct to report SOB or any respiratory difficulty- Respiratory Therapy support as indicated  Outcome: Progressing     Problem: GASTROINTESTINAL - ADULT  Goal: Minimal or absence of nausea and/or vomiting  Description: INTERVENTIONS:- Administer IV fluids if ordered to ensure adequate hydration- Maintain NPO status until nausea and vomiting are resolved- Nasogastric tube if ordered- Administer ordered antiemetic medications as needed- Provide nonpharmacologic comfort measures as appropriate- Advance diet as tolerated, if ordered- Consider nutrition services referral to assist patient with adequate nutrition and appropriate food choices  Outcome: Progressing  Goal: Maintains or returns to baseline bowel function  Description: INTERVENTIONS:- Assess bowel function- Encourage oral fluids to ensure adequate hydration- Administer IV fluids if ordered to ensure adequate hydration- Administer ordered medications as needed- Encourage mobilization and activity- Consider nutritional services referral to assist patient with adequate nutrition and appropriate food choices  Outcome: Progressing  Goal: Maintains adequate nutritional intake  Description: INTERVENTIONS:- Monitor percentage of each meal consumed- Identify factors contributing to decreased intake, treat as appropriate- Assist with meals as needed- Monitor I&O, weight, and lab values if indicated- Obtain nutrition services referral as needed  Outcome: Progressing  Goal: Establish and maintain optimal ostomy function  Description: INTERVENTIONS:- Assess bowel function- Encourage oral fluids to ensure adequate hydration- Administer IV fluids if ordered to ensure adequate hydration - Administer ordered medications as needed- Encourage mobilization and activity- Nutrition services referral to assist patient with appropriate food choices- Assess stoma site- Consider wound care consult   Outcome: Progressing  Goal: Oral mucous membranes  remain intact  Description: INTERVENTIONS- Assess oral mucosa and hygiene practices- Implement preventative oral hygiene regimen- Implement oral medicated treatments as ordered- Initiate Nutrition services referral as needed  Outcome: Progressing     Problem: METABOLIC, FLUID AND ELECTROLYTES - ADULT  Goal: Electrolytes maintained within normal limits  Description: INTERVENTIONS:- Monitor labs and assess patient for signs and symptoms of electrolyte imbalances- Administer electrolyte replacement as ordered- Monitor response to electrolyte replacements, including repeat lab results as appropriate- Instruct patient on fluid and nutrition as appropriate  Outcome: Progressing  Goal: Fluid balance maintained  Description: INTERVENTIONS:- Monitor labs - Monitor I/O and WT- Instruct patient on fluid and nutrition as appropriate- Assess for signs & symptoms of volume excess or deficit  Outcome: Progressing  Goal: Glucose maintained within target range  Description: INTERVENTIONS:- Monitor Blood Glucose as ordered- Assess for signs and symptoms of hyperglycemia and hypoglycemia- Administer ordered medications to maintain glucose within target range- Assess nutritional intake and initiate nutrition service referral as needed  Outcome: Progressing     Problem: Nutrition/Hydration-ADULT  Goal: Nutrient/Hydration intake appropriate for improving, restoring or maintaining nutritional needs  Description: Monitor and assess patient's nutrition/hydration status for malnutrition. Collaborate with interdisciplinary team and initiate plan and interventions as ordered.  Monitor patient's weight and dietary intake as ordered or per policy. Utilize nutrition screening tool and intervene as necessary. Determine patient's food preferences and provide high-protein, high-caloric foods as appropriate. INTERVENTIONS:- Monitor oral intake, urinary output, labs, and treatment plans- Assess nutrition and hydration status and recommend course of  action- Evaluate amount of meals eaten- Assist patient with eating if necessary - Allow adequate time for meals- Recommend/ encourage appropriate diets, oral nutritional supplements, and vitamin/mineral supplements- Order, calculate, and assess calorie counts as needed- Recommend, monitor, and adjust tube feedings and TPN/PPN based on assessed needs- Assess need for intravenous fluids- Provide specific nutrition/hydration education as appropriate- Include patient/family/caregiver in decisions related to nutrition  Outcome: Progressing

## 2025-04-24 NOTE — CASE MANAGEMENT
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 (CODE) ([facility]) 2025     Multifocal atrial tachycardia ([facility]) 2025     Hyponatremia 2025     Gastro-esophageal reflux disease without esophagitis 10/19/2024     Acute kidney injury superimposed on chronic kidney disease  ([facility]) 10/18/2024     Constipation 2024     Chronic diastolic congestive heart failure ([facility]) 2024     s/p Medtronic loop recorder 3/2/2024 2024     Moderate protein-calorie malnutrition ([facility]) 2024     Left ventricular outflow obstruction 2024     Obesity, Class I, BMI 30-34.9 2024     Non obstructive CAD 11/15/2023     Chronic obstructive pulmonary disease, unspecified COPD type ([facility]) 2023     Anemia in stage 3b chronic kidney disease  ([facility]) 2023     Cerebrovascular accident (CVA), unspecified mechanism ([facility]) 2022     Prepyloric ulcer 2021     Mild intermittent asthma without complication 2020     S/P insertion of spinal cord stimulator 2018     Iron deficiency anemia secondary to inadequate dietary iron intake 2018     Type 2 diabetes mellitus with other skin complications ([facility])      Hypothyroidism 2017     Hypercholesterolemia 2016     Anxiety 2015     Other chronic pain 2013     Hypertension 2013       LOS (days): 2  Geometric Mean LOS (GMLOS) (days): 3.2  Days to GMLOS:1.8     OBJECTIVE:  Risk of Unplanned Readmission Score: 45.81         Current admission status: Inpatient   Preferred Pharmacy:   [place] [facility]  [address]  [address]  [address]  [facility] [address]  Phone: [phone] Fax: [phone]    [facility] [place] [address],  [address],  First Floor [facility] [place] [address]  Phone: [phone] Fax: [phone]    [facility] - [place], [place] - [address]  [address]  [place] [address]  Phone: [phone] Fax: [phone]    Primary Care Provider: Halley Clark MD    Primary Insurance: [employer] [facility]  Secondary Insurance:     DISCHARGE DETAILS:       Additional Comments: CM spoke with Emerson at [facility] (PH: [phone]) and left message for wellness center. Patient  is reportedly majority utilizes a wheelchair; although reportedly able to stand and pivot. Patient reportedly utilizes a walker and wheelchair at baseline. CM left message for [facility] (PH: [phone] ext. [phone]).CM to follow for further discharge planning needs.

## 2025-04-24 NOTE — PROGRESS NOTES
Progress Note - Hospitalist   Name: Mattie Varela 80 y.o. female I MRN: [id number]  Unit/Bed#: [place] [address] -01 I Date of Admission: 4/22/2025   Date of Service: 4/24/2025 I Hospital Day: 2    Assessment & Plan  Gastric outlet obstruction  Presents with catheter dislodgement, found with significant stomach distension   CT abd/pelvis noting marked distension of stomach with gas/ingested material, normal small/large bowel    The patient underwent a barium study which has excluded gastric outlet obstruction.  Her NG tube has been discontinued.  Clear liquid diet has been started.  Acute kidney injury superimposed on chronic kidney disease  (HCC)  CKD in setting of age-related, hypertensive, diabetic nephropathy. Suspect pre-renal SADAF in setting of poor PO intake, diuretic use  Baseline Cr: 1.4  Admit Cr: 1.7   The patient's creatinine has returned to baseline.  Torsemide will be resumed tomorrow.  Chronic diastolic congestive heart failure (HCC)  Wt Readings from Last 3 Encounters:   04/23/25 78.6 kg (173 lb 4.5 oz)   04/11/25 79.8 kg (176 lb)   03/04/25 76.2 kg (168 lb)     Compensated  Outpatient regimen: Toprol 12.5mg daily, torsemide 30mg daily  Type 2 diabetes mellitus with other skin complications ([facility])  Lab Results   Component Value Date    HGBA1C 7.4 (H) 04/23/2025     Recent Labs     04/24/25  0017 04/24/25  0633 04/24/25  0652 04/24/25  1107   POCGLU 143* 55* 164* 147*     The patient's diabetes has been diet controlled before admission.  In light of the patient's age and overall medical condition, I believe that a hemoglobin of 7.4% represents excellent control.  Insulin scale will be canceled.  Other chronic pain  With chronic pain disorder secondary to degenerative lumbar spinal stenosis   PDMP on morphine sulfate 15mg q6h, fentanyl patch    Plan:  Multimodal analgesia   The patient's NG tube has been discontinued so her usual analgesic regimen will be reestablished.  Hypothyroidism  Continue thyroid  hormone replacement.  Chronic obstructive pulmonary disease, unspecified COPD type (HCC)  Continue inhaled bronchodilators.      Subjective:  The patient feels reasonably well.  She slept okay.  She has no chest pain, shortness of breath, abdominal pain, nausea, or vomiting.  She had a barium study today that showed no evidence of gastric outlet obstruction.    Physical Exam:   Temp:  [97.3 °F (36.3 °C)-98.7 °F (37.1 °C)] 97.3 °F (36.3 °C)  HR:  [] 88  BP: (165-170)/(77-91) 166/90  Resp:  [16-20] 16  SpO2:  [95 %-100 %] 100 %  O2 Device: None (Room air)    Gen: Well-developed, well-nourished, in no distress.  Neck: Supple.  No lymphadenopathy or goiter.  Heart: Regular rhythm.  I heard no murmur or gallop.  Lungs: Clear to auscultation and percussion.  No wheezing, rales, or rhonchi.  Abd: Soft with active bowel sounds.  No mass or tenderness.  Extremities: No clubbing, cyanosis, or edema.  No calf tenderness.  Neuro: Alert and oriented.  No focal sign.  Skin: Warm and dry.      LABS: No new labs.          VTE Pharmacologic Prophylaxis: Heparin  VTE Mechanical Prophylaxis: sequential compression device

## 2025-04-24 NOTE — TELEPHONE ENCOUNTER
----- Message from Zaheer YO sent at 4/24/2025  1:48 PM EDT -----    ----- Message -----  From: Garth Arredondo MD  Sent: 4/24/2025   1:42 PM EDT  To: #    Please arrange EGD for patient in 4 to 6 weeks with me at [facility].  Procedure ordered.  Outpatient GI follow-up was ordered for the patient.  Please make sure she gets that follow-up in the office 1 month after completing the EGD.

## 2025-04-24 NOTE — NURSING NOTE
Night shift RN passed on concern from previous RN about ahn catheter leaking. No leakage noted during this shift. Will continue to monitor for duration of shift.    Riki Abdul 4/24/2025 2:51 PM

## 2025-04-24 NOTE — CASE MANAGEMENT
Case Management Assessment & Discharge Planning Note    Patient name Mattie Varela  Location 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 (CODE) ([facility]) 2025     Multifocal atrial tachycardia ([facility]) 2025     Hyponatremia 2025     Gastro-esophageal reflux disease without esophagitis 10/19/2024     Acute kidney injury superimposed on chronic kidney disease  ([facility]) 10/18/2024     Constipation 2024     Chronic diastolic congestive heart failure ([facility]) 2024     s/p Medtronic loop recorder 3/2/2024 2024     Moderate protein-calorie malnutrition ([facility]) 2024     Left ventricular outflow obstruction 2024     Obesity, Class I, BMI 30-34.9 2024     Non obstructive CAD 11/15/2023     Chronic obstructive pulmonary disease, unspecified COPD type ([facility]) 2023     Anemia in stage 3b chronic kidney disease  ([facility]) 2023     Cerebrovascular accident (CVA), unspecified mechanism ([facility]) 2022     Prepyloric ulcer 2021     Mild intermittent asthma without complication 2020     S/P insertion of spinal cord stimulator 2018     Iron deficiency anemia secondary to inadequate dietary iron intake 2018     Type 2 diabetes mellitus with other skin complications ([facility])      Hypothyroidism 2017     Hypercholesterolemia 2016     Anxiety 2015     Other chronic pain 2013     Hypertension 2013       LOS (days): 2  Geometric Mean LOS (GMLOS) (days): 3.2  Days to GMLOS:1.5     OBJECTIVE:    Risk of Unplanned Readmission Score: 44.09         Current admission status: Inpatient       Preferred Pharmacy:   [facility] -  [place], [place] - [address]  [address]  [address]  [facility] [address]  Phone: [phone] Fax: [phone]    [facility] [place], [place] - [address]  [facility],  [address]  [facility],  First [address] [place]  [facility] [address]  Phone: [phone] Fax: [phone]    [facility] - [place], [place] - [address]  [address]  [place] [address]  Phone: [phone] Fax: [phone]    Primary Care Provider: Halley Clark MD    Primary Insurance: [facility]  Secondary Insurance:     ASSESSMENT:  Active Health Care Proxies       JorgitofavianVenice Health Care Representative - Daughter   Primary Phone: [phone] (Mobile)  Home Phone: [phone]                 Advance Directives  Does patient have a Health Care POA?: Yes (Venice Baires (Daughter)  [phone] (Mobile))  Does patient have Advance Directives?: Yes  Advance Directives: Living will  Primary Contact: Venice Baires (Daughter)  [phone] (Mobile)    Readmission Root Cause  30 Day Readmission: No    Patient Information  Admitted from:: Facility ([facility])  Mental Status: Alert  During Assessment patient was accompanied by: Not accompanied during assessment  Assessment information provided by:: Patient  Primary Caregiver: Self  Support Systems: Self, Children, Daughter  County of Residence: [place]  [place] [facility] do you live in?: [place]  Home entry access options. Select all that apply.: No steps to enter home  Living Arrangements: Lives in Facility  Is patient a ?: No    Activities of Daily Living Prior to Admission  Functional Status: Assistance (Utilizes Wheelchair)  Completes ADLs independently?: Yes  Ambulates independently?: No  Level of ambulatory dependence: Assistance (Utilizes Wheelchair)  Does patient use assisted devices?: Yes  Assisted Devices (DME) used: Wheelchair, Walker  Does patient currently own DME?: Yes  What DME does the patient currently  own?: Wheelchair, Walker  Does patient have a history of Outpatient Therapy (PT/OT)?: No  Does the patient have a history of Short-Term Rehab?: No  Does patient have a history of HHC?: Yes ([facility])  Does patient currently have HHC?: No ([facility]- [facility])    Patient Information Continued  Income Source: SSI/SSD  Does patient have prescription coverage?: Yes  Can the patient afford their medications and any related supplies (such as glucometers or test strips)?: N/A  Does patient receive dialysis treatments?: No  Does patient have a history of substance abuse?: No  Does patient have a history of Mental Health Diagnosis?: No      Means of Transportation  Means of Transport to Appts:: Other (Comment) ([facility])      DISCHARGE DETAILS:    Discharge planning discussed with:: Patient  Freedom of Choice: Yes  Comments - Freedom of Choice: Patient confirmed and agreeable to return to [facility] with PT ([facility]) from [facility].  CM contacted family/caregiver?: No- see comments (Patient declined need for CM to contact daughter/ family members at this time.)  Were Treatment Team discharge recommendations reviewed with patient/caregiver?: Yes  Did patient/caregiver verbalize understanding of patient care needs?: N/A- going to facility       Contacts  Patient Contacts: daughter, Dr. Venice Baires  Relationship to Patient:: Family  Contact Method: Phone  Phone Number: [phone]  Reason/Outcome: Emergency Contact    Requested Home Health Care         Is the patient interested in HHC at discharge?: No    DME Referral Provided  Referral made for DME?: No    Other Referral/Resources/Interventions Provided:  Interventions: HHC, Assisted Living  Referral Comments: [facility] with [employer] providing therapy    Treatment Team Recommendation: Assisted Living, Other ([facility] providing therapy)  Discharge Destination Plan:: Assisted Living,  Other ([facility] providing therapy)  Transport at Discharge : Stretcher van       Additional Comments: CM spoke with patient to complete assessment; patient alert. Patient confirmed  agreement with return to [facility] [place] and resume therapy with [facility]. CM sent follow up message to [facility] attending, confirming patient need for discharge prior to 2:00P.M. for prior to weekend admissions. CM offered to update patient's daughter, patient declined need. Patient confirmed with CM typically will walk with walker with ([facility]) physical therapist. Patient denies any further CM needs at this time. CM to follow for further discharge planning needs.

## 2025-04-24 NOTE — PROGRESS NOTES
Progress Note - Gastroenterology   Name: Mattie Varela 80 y.o. female I MRN: [id number]  Unit/Bed#: [place] [address] -01 I Date of Admission: 4/22/2025   Date of Service: 4/24/2025 I Hospital Day: 2    Assessment & Plan  Gastric outlet obstruction  80 y.o. female with history of hypertension, HFpEF (LVEF 51% on 11/22/2024), coronary artery disease on aspirin, stroke, class I obesity (BMI 33), CKD, peptic ulcer disease and type 2 diabetes as well as chronic pain due to degenerative lumbar spinal stenosis on opioids who presented on 4/22 due dislodged chronic Bateman catheter while having a bowel movement she had some gastric distention noted in the ED, but otherwise was feeling well without symptoms found to have elevated creatinine on admission 1.71 improving 1.39 on day 2 (baseline 0.9-1.2) with CT abdomen performed for distention showing marked distention of the stomach with gas and ingested material with duodenum and rest of small bowel normal caliber with moderate stool burden in colon overall concerning for gastric distention in the setting of opioid use, diabetes, recent meal less concerning for any functional gastroparesis as patient asymptomatic leading to GI consult.    - Gastric distention seen on CT likely in the setting of recent meal as well as diabetes, opioid use and constipation.  No clinical evidence of gastroparesis.  Patient says she often does eat smaller meals now, but overall has been feeling well without nausea, vomiting, weight loss  - Recommend bowel regiment with MiraLAX twice daily, senna to be continued at discharge  - Limit opioids as able  - No need for endoscopic evaluation as patient asymptomatic  - Small bowel follow-through study showing rapid progression of contrast into duodenum and filling distal small intestine  - Defer timing of NG tube removal to surgery  - Okay for diet from GI standpoint  - If patient tolerating diet starting with clear liquid diet then progressing to regular  okay for discharge from GI standpoint and follow-up in GI clinic    Type 2 diabetes mellitus with other skin complications (HCC)  Lab Results   Component Value Date    HGBA1C 7.4 (H) 04/23/2025       Recent Labs     04/23/25  2352 04/24/25  0017 04/24/25  0633 04/24/25  0652   POCGLU 37* 143* 55* 164*       Blood Sugar Average: Last 72 hrs:  (P) 115    Other chronic pain    Hypothyroidism    Chronic obstructive pulmonary disease, unspecified COPD type (HCC)      I have discussed the above management plan in detail with the primary service.     Subjective   Patient feeling well overall other than NG tube.  Denies nausea, vomiting, abdominal pain.  Does have quite a bit of bilateral lower extremity tenderness, which she states is chronic for her.    Objective :  Temp:  [97.3 °F (36.3 °C)-98.7 °F (37.1 °C)] 97.3 °F (36.3 °C)  HR:  [] 88  BP: (165-170)/(77-91) 166/90  Resp:  [16-20] 16  SpO2:  [95 %-100 %] 100 %  O2 Device: None (Room air)    Physical Exam  Vitals and nursing note reviewed.   Constitutional:       General: She is not in acute distress.     Appearance: She is well-developed and normal weight.   Abdominal:      General: Abdomen is flat. There is no distension.      Palpations: Abdomen is soft.      Tenderness: There is no abdominal tenderness.   Musculoskeletal:         General: No swelling.   Neurological:      Mental Status: She is alert.         Lab Results: I have reviewed the following results:none    Imaging Results Review: No pertinent imaging studies reviewed.  Other Study Results Review: No additional pertinent studies reviewed.    Administrative Statements   I have spent a total time of 32 minutes in caring for this patient on the day of the visit/encounter including Diagnostic results, Prognosis, Risks and benefits of tx options, Instructions for management, and Patient and family education.

## 2025-04-24 NOTE — PROGRESS NOTES
Progress Note - Surgery-General   Name: Mattie Varela 80 y.o. female I MRN: [id number]  Unit/Bed#: [place] [address] -01 I Date of Admission: 4/22/2025   Date of Service: 4/24/2025 I Hospital Day: 2    Assessment & Plan  Gastric outlet obstruction  Patient is an 80-year-old female with past medical history of CVA, hypertension, HFpEF, CAD, CKD, type 2 diabetes who presented with CT findings demonstrating marked distention of the stomach with gas and ingested material concerning for gastric outlet obstruction.    - Continue N.p.o., NG tube  - Plan for UGI today. If not obstructed, tentatively plan for NGT removal  - GI: no emergent indication to endoscopy. Will wait for upper GI series. If no obstruction will try diet and eval EGD OP. If not tolerate diet then EGD  - Pain and nausea control as needed  - DVT ppx  - Rest of care per primary team  Acute kidney injury superimposed on chronic kidney disease  (HCC)  Patient appears to have SADAF on CKD on admission with a creatinine of 1.71 which appears elevated from baseline of 1.4-1.5.     - Recommend IV fluid resuscitation  - Care per primary team    Please contact the SecureChat role for the Surgery-General service with any questions/concerns.    Subjective   No acute events overnight. Patient reports pain is controlled. They are not yet having flatus or BM. They are having clear yellow urine output in ahn.  They deny nausea, vomiting, chest pain, shortness of breath, fevers, chills.      Objective :  Temp:  [98.3 °F (36.8 °C)-99.3 °F (37.4 °C)] 98.3 °F (36.8 °C)  HR:  [] 103  BP: (113-170)/(54-91) 168/91  Resp:  [18-20] 20  SpO2:  [95 %-97 %] 95 %  O2 Device: None (Room air)    I/O         04/22 0701 04/23 0700 04/23 0701  04/24 0700    P.O. 0 0    Total Intake(mL/kg) 0 (0) 0 (0)    Urine (mL/kg/hr) 825 60 (0)    Emesis/NG output 75 1435    Total Output 900 1495    Net -900 -1495                Lines/Drains/Airways       Active Status       Name Placement date  Placement time Site Days    NG/OG/Enteral Tube Nasogastric 18 Fr Right nare 04/22/25 2333  Right nare  1    Urethral Catheter Latex 16 Fr. 04/22/25 2027  Latex  1                  Physical Exam  Constitutional:       General: She is not in acute distress.  HENT:      Head: Normocephalic and atraumatic.      Right Ear: External ear normal.      Left Ear: External ear normal.      Nose: Nose normal.      Comments: NGT in place     Mouth/Throat:      Pharynx: Oropharynx is clear.   Eyes:      Extraocular Movements: Extraocular movements intact.   Cardiovascular:      Rate and Rhythm: Normal rate.   Pulmonary:      Effort: Pulmonary effort is normal.   Abdominal:      General: There is no distension.      Palpations: Abdomen is soft.      Tenderness: There is no abdominal tenderness.   Musculoskeletal:      Cervical back: Normal range of motion.   Skin:     General: Skin is warm and dry.   Neurological:      Mental Status: She is alert. Mental status is at baseline.   Psychiatric:         Mood and Affect: Mood normal.           Lab Results: I have reviewed the following results:  Recent Labs     04/22/25 2036 04/23/25  0458   WBC 7.79 6.88   HGB 10.6* 9.7*   HCT 33.5* 31.7*    279   SODIUM 136 139   K 5.0 3.8   CL 95* 98   CO2 34* 34*   BUN 19 18   CREATININE 1.71* 1.39*   GLUC 168* 144*   AST 28  --    ALT 11  --    ALB 3.7  --    TBILI 0.31  --    ALKPHOS 132*  --        Imaging Results Review: I reviewed radiology reports from this admission including: CT abdomen/pelvis.  Other Study Results Review: No additional pertinent studies reviewed.    VTE Pharmacologic Prophylaxis: VTE covered by:  heparin (porcine), Subcutaneous, 5,000 Units at 04/23/25 2123     VTE Mechanical Prophylaxis: sequential compression device

## 2025-04-24 NOTE — PLAN OF CARE
Problem: Prexisting or High Potential for Compromised Skin Integrity  Goal: Skin integrity is maintained or improved  Description: INTERVENTIONS:- Identify patients at risk for skin breakdown- Assess and monitor skin integrity- Assess and monitor nutrition and hydration status- Monitor labs - Assess for incontinence - Turn and reposition patient- Assist with mobility/ambulation- Relieve pressure over bony prominences- Avoid friction and shearing- Provide appropriate hygiene as needed including keeping skin clean and dry- Evaluate need for skin moisturizer/barrier cream- Collaborate with interdisciplinary team - Patient/family teaching- Consider wound care consult   Outcome: Progressing     Problem: PAIN - ADULT  Goal: Verbalizes/displays adequate comfort level or baseline comfort level  Description: Interventions:- Encourage patient to monitor pain and request assistance- Assess pain using appropriate pain scale- Administer analgesics based on type and severity of pain and evaluate response- Implement non-pharmacological measures as appropriate and evaluate response- Consider cultural and social influences on pain and pain management- Notify physician/advanced practitioner if interventions unsuccessful or patient reports new pain  Outcome: Progressing     Problem: INFECTION - ADULT  Goal: Absence or prevention of progression during hospitalization  Description: INTERVENTIONS:- Assess and monitor for signs and symptoms of infection- Monitor lab/diagnostic results- Monitor all insertion sites, i.e. indwelling lines, tubes, and drains- Monitor endotracheal if appropriate and nasal secretions for changes in amount and color- [place] appropriate cooling/warming therapies per order- Administer medications as ordered- Instruct and encourage patient and family to use good hand hygiene technique- Identify and instruct in appropriate isolation precautions for identified infection/condition  Outcome: Progressing  Goal:  Absence of fever/infection during neutropenic period  Description: INTERVENTIONS:- Monitor WBC  Outcome: Progressing     Problem: SAFETY ADULT  Goal: Patient will remain free of falls  Description: INTERVENTIONS:- Educate patient/family on patient safety including physical limitations- Instruct patient to call for assistance with activity - Consult OT/PT to assist with strengthening/mobility - Keep Call bell within reach- Keep bed low and locked with side rails adjusted as appropriate- Keep care items and personal belongings within reach- Initiate and maintain comfort rounds- Make Fall Risk Sign visible to staff- Offer Toileting every 2 Hours, in advance of need- Initiate/Maintain bed alarm- Obtain necessary fall risk management equipment: bed alarm- Apply yellow socks and bracelet for high fall risk patients- Consider moving patient to room near nurses station  Outcome: Progressing  Goal: Maintain or return to baseline ADL function  Description: INTERVENTIONS:-  Assess patient's ability to carry out ADLs; assess patient's baseline for ADL function and identify physical deficits which impact ability to perform ADLs (bathing, care of mouth/teeth, toileting, grooming, dressing, etc.)- Assess/evaluate cause of self-care deficits - Assess range of motion- Assess patient's mobility; develop plan if impaired- Assess patient's need for assistive devices and provide as appropriate- Encourage maximum independence but intervene and supervise when necessary- Involve family in performance of ADLs- Assess for home care needs following discharge - Consider OT consult to assist with ADL evaluation and planning for discharge- Provide patient education as appropriate  Outcome: Progressing  Goal: Maintains/Returns to pre admission functional level  Description: INTERVENTIONS:- Perform AM-PAC 6 Click Basic Mobility/ Daily Activity assessment daily.- Set and communicate daily mobility goal to care team and patient/family/caregiver. -  Collaborate with rehabilitation services on mobility goals if consulted- Perform Range of Motion 4 times a day.- Reposition patient every 2 hours.- Dangle patient 3 times a day- Stand patient 3 times a day- Ambulate patient 3 times a day- Out of bed to chair 3 times a day - Out of bed for meals 3 times a day- Out of bed for toileting- Record patient progress and toleration of activity level   Outcome: Progressing     Problem: DISCHARGE PLANNING  Goal: Discharge to home or other facility with appropriate resources  Description: INTERVENTIONS:- Identify barriers to discharge w/patient and caregiver- Arrange for needed discharge resources and transportation as appropriate- Identify discharge learning needs (meds, wound care, etc.)- Arrange for interpretive services to assist at discharge as needed- Refer to Case Management Department for coordinating discharge planning if the patient needs post-hospital services based on physician/advanced practitioner order or complex needs related to functional status, cognitive ability, or social support system  Outcome: Progressing     Problem: Knowledge Deficit  Goal: Patient/family/caregiver demonstrates understanding of disease process, treatment plan, medications, and discharge instructions  Description: Complete learning assessment and assess knowledge base.Interventions:- Provide teaching at level of understanding- Provide teaching via preferred learning methods  Outcome: Progressing     Problem: RESPIRATORY - ADULT  Goal: Achieves optimal ventilation and oxygenation  Description: INTERVENTIONS:- Assess for changes in respiratory status- Assess for changes in mentation and behavior- Position to facilitate oxygenation and minimize respiratory effort- Oxygen administered by appropriate delivery if ordered- Initiate smoking cessation education as indicated- Encourage broncho-pulmonary hygiene including cough, deep breathe, Incentive Spirometry- Assess the need for suctioning and  aspirate as needed- Assess and instruct to report SOB or any respiratory difficulty- Respiratory Therapy support as indicated  Outcome: Progressing     Problem: GASTROINTESTINAL - ADULT  Goal: Minimal or absence of nausea and/or vomiting  Description: INTERVENTIONS:- Administer IV fluids if ordered to ensure adequate hydration- Maintain NPO status until nausea and vomiting are resolved- Nasogastric tube if ordered- Administer ordered antiemetic medications as needed- Provide nonpharmacologic comfort measures as appropriate- Advance diet as tolerated, if ordered- Consider nutrition services referral to assist patient with adequate nutrition and appropriate food choices  Outcome: Progressing  Goal: Maintains or returns to baseline bowel function  Description: INTERVENTIONS:- Assess bowel function- Encourage oral fluids to ensure adequate hydration- Administer IV fluids if ordered to ensure adequate hydration- Administer ordered medications as needed- Encourage mobilization and activity- Consider nutritional services referral to assist patient with adequate nutrition and appropriate food choices  Outcome: Progressing  Goal: Maintains adequate nutritional intake  Description: INTERVENTIONS:- Monitor percentage of each meal consumed- Identify factors contributing to decreased intake, treat as appropriate- Assist with meals as needed- Monitor I&O, weight, and lab values if indicated- Obtain nutrition services referral as needed  Outcome: Progressing  Goal: Establish and maintain optimal ostomy function  Description: INTERVENTIONS:- Assess bowel function- Encourage oral fluids to ensure adequate hydration- Administer IV fluids if ordered to ensure adequate hydration - Administer ordered medications as needed- Encourage mobilization and activity- Nutrition services referral to assist patient with appropriate food choices- Assess stoma site- Consider wound care consult   Outcome: Progressing  Goal: Oral mucous membranes  remain intact  Description: INTERVENTIONS- Assess oral mucosa and hygiene practices- Implement preventative oral hygiene regimen- Implement oral medicated treatments as ordered- Initiate Nutrition services referral as needed  Outcome: Progressing     Problem: METABOLIC, FLUID AND ELECTROLYTES - ADULT  Goal: Electrolytes maintained within normal limits  Description: INTERVENTIONS:- Monitor labs and assess patient for signs and symptoms of electrolyte imbalances- Administer electrolyte replacement as ordered- Monitor response to electrolyte replacements, including repeat lab results as appropriate- Instruct patient on fluid and nutrition as appropriate  Outcome: Progressing  Goal: Fluid balance maintained  Description: INTERVENTIONS:- Monitor labs - Monitor I/O and WT- Instruct patient on fluid and nutrition as appropriate- Assess for signs & symptoms of volume excess or deficit  Outcome: Progressing  Goal: Glucose maintained within target range  Description: INTERVENTIONS:- Monitor Blood Glucose as ordered- Assess for signs and symptoms of hyperglycemia and hypoglycemia- Administer ordered medications to maintain glucose within target range- Assess nutritional intake and initiate nutrition service referral as needed  Outcome: Progressing     Problem: Nutrition/Hydration-ADULT  Goal: Nutrient/Hydration intake appropriate for improving, restoring or maintaining nutritional needs  Description: Monitor and assess patient's nutrition/hydration status for malnutrition. Collaborate with interdisciplinary team and initiate plan and interventions as ordered.  Monitor patient's weight and dietary intake as ordered or per policy. Utilize nutrition screening tool and intervene as necessary. Determine patient's food preferences and provide high-protein, high-caloric foods as appropriate. INTERVENTIONS:- Monitor oral intake, urinary output, labs, and treatment plans- Assess nutrition and hydration status and recommend course of  action- Evaluate amount of meals eaten- Assist patient with eating if necessary - Allow adequate time for meals- Recommend/ encourage appropriate diets, oral nutritional supplements, and vitamin/mineral supplements- Order, calculate, and assess calorie counts as needed- Recommend, monitor, and adjust tube feedings and TPN/PPN based on assessed needs- Assess need for intravenous fluids- Provide specific nutrition/hydration education as appropriate- Include patient/family/caregiver in decisions related to nutrition  Outcome: Progressing

## 2025-04-25 VITALS
TEMPERATURE: 98.9 F | BODY MASS INDEX: 34.02 KG/M2 | SYSTOLIC BLOOD PRESSURE: 120 MMHG | DIASTOLIC BLOOD PRESSURE: 67 MMHG | HEART RATE: 94 BPM | RESPIRATION RATE: 18 BRPM | WEIGHT: 173.28 LBS | HEIGHT: 60 IN | OXYGEN SATURATION: 95 %

## 2025-04-25 LAB — GLUCOSE SERPL-MCNC: 135 MG/DL (ref 65–140)

## 2025-04-25 PROCEDURE — 99239 HOSP IP/OBS DSCHRG MGMT >30: CPT | Performed by: INTERNAL MEDICINE

## 2025-04-25 PROCEDURE — NC001 PR NO CHARGE: Performed by: SURGERY

## 2025-04-25 PROCEDURE — 82948 REAGENT STRIP/BLOOD GLUCOSE: CPT

## 2025-04-25 RX ORDER — NYSTATIN 100000 [USP'U]/G
POWDER TOPICAL 2 TIMES DAILY
Qty: 60 G | Refills: 0 | Status: ON HOLD | OUTPATIENT
Start: 2025-04-25

## 2025-04-25 RX ADMIN — MORPHINE SULFATE 15 MG: 15 TABLET ORAL at 01:23

## 2025-04-25 RX ADMIN — PANTOPRAZOLE SODIUM 40 MG: 40 TABLET, DELAYED RELEASE ORAL at 05:07

## 2025-04-25 RX ADMIN — ACETAMINOPHEN 975 MG: 325 TABLET, FILM COATED ORAL at 04:37

## 2025-04-25 RX ADMIN — METHOCARBAMOL 750 MG: 750 TABLET ORAL at 05:07

## 2025-04-25 RX ADMIN — NYSTATIN: 100000 POWDER TOPICAL at 08:15

## 2025-04-25 RX ADMIN — ASPIRIN 81 MG: 81 TABLET, CHEWABLE ORAL at 08:16

## 2025-04-25 RX ADMIN — LEVOTHYROXINE SODIUM 37.5 MCG: 0.07 TABLET ORAL at 05:07

## 2025-04-25 RX ADMIN — MORPHINE SULFATE 15 MG: 15 TABLET ORAL at 07:12

## 2025-04-25 RX ADMIN — METOPROLOL SUCCINATE 12.5 MG: 25 TABLET, EXTENDED RELEASE ORAL at 08:16

## 2025-04-25 RX ADMIN — POLYETHYLENE GLYCOL 3350 17 G: 17 POWDER, FOR SOLUTION ORAL at 08:15

## 2025-04-25 RX ADMIN — ATORVASTATIN CALCIUM 40 MG: 40 TABLET, FILM COATED ORAL at 08:16

## 2025-04-25 RX ADMIN — SENNOSIDES 8.6 MG: 8.6 TABLET, FILM COATED ORAL at 08:16

## 2025-04-25 RX ADMIN — HEPARIN SODIUM 5000 UNITS: 5000 INJECTION INTRAVENOUS; SUBCUTANEOUS at 05:07

## 2025-04-25 RX ADMIN — OXYBUTYNIN CHLORIDE 5 MG: 5 TABLET ORAL at 08:16

## 2025-04-25 NOTE — PLAN OF CARE
Problem: Prexisting or High Potential for Compromised Skin Integrity  Goal: Skin integrity is maintained or improved  Description: INTERVENTIONS:- Identify patients at risk for skin breakdown- Assess and monitor skin integrity- Assess and monitor nutrition and hydration status- Monitor labs - Assess for incontinence - Turn and reposition patient- Assist with mobility/ambulation- Relieve pressure over bony prominences- Avoid friction and shearing- Provide appropriate hygiene as needed including keeping skin clean and dry- Evaluate need for skin moisturizer/barrier cream- Collaborate with interdisciplinary team - Patient/family teaching- Consider wound care consult   4/25/2025 0807 by Evelyn Balbuena-Reyes, RN  Outcome: Progressing  4/25/2025 0807 by Evelyn Balbuena-Reyes, RN  Outcome: Progressing     Problem: PAIN - ADULT  Goal: Verbalizes/displays adequate comfort level or baseline comfort level  Description: Interventions:- Encourage patient to monitor pain and request assistance- Assess pain using appropriate pain scale- Administer analgesics based on type and severity of pain and evaluate response- Implement non-pharmacological measures as appropriate and evaluate response- Consider cultural and social influences on pain and pain management- Notify physician/advanced practitioner if interventions unsuccessful or patient reports new pain  4/25/2025 0807 by Evelyn Balbuena-Reyes, RN  Outcome: Progressing  4/25/2025 0807 by Evelyn Balbuena-Reyes, RN  Outcome: Progressing     Problem: INFECTION - ADULT  Goal: Absence or prevention of progression during hospitalization  Description: INTERVENTIONS:- Assess and monitor for signs and symptoms of infection- Monitor lab/diagnostic results- Monitor all insertion sites, i.e. indwelling lines, tubes, and drains- Monitor endotracheal if appropriate and nasal secretions for changes in amount and color- [place] appropriate cooling/warming therapies per order- Administer  medications as ordered- Instruct and encourage patient and family to use good hand hygiene technique- Identify and instruct in appropriate isolation precautions for identified infection/condition  4/25/2025 0807 by Evelyn Balbuena-Reyes, RN  Outcome: Progressing  4/25/2025 0807 by Evelyn Balbuena-Reyes, RN  Outcome: Progressing  Goal: Absence of fever/infection during neutropenic period  Description: INTERVENTIONS:- Monitor WBC  4/25/2025 0807 by Evelyn Balbuena-Reyes, RN  Outcome: Progressing  4/25/2025 0807 by Evelyn Balbuena-Reyes, RN  Outcome: Progressing     Problem: SAFETY ADULT  Goal: Patient will remain free of falls  Description: INTERVENTIONS:- Educate patient/family on patient safety including physical limitations- Instruct patient to call for assistance with activity - Consult OT/PT to assist with strengthening/mobility - Keep Call bell within reach- Keep bed low and locked with side rails adjusted as appropriate- Keep care items and personal belongings within reach- Initiate and maintain comfort rounds- Make Fall Risk Sign visible to staff- Offer Toileting every 2 Hours, in advance of need- Initiate/Maintain bed alarm- Obtain necessary fall risk management equipment: bed alarm- Apply yellow socks and bracelet for high fall risk patients- Consider moving patient to room near nurses station  4/25/2025 0807 by Evelyn Balbuena-Reyes, RN  Outcome: Progressing  4/25/2025 0807 by Evelyn Balbuena-Reyes, RN  Outcome: Progressing  Goal: Maintain or return to baseline ADL function  Description: INTERVENTIONS:-  Assess patient's ability to carry out ADLs; assess patient's baseline for ADL function and identify physical deficits which impact ability to perform ADLs (bathing, care of mouth/teeth, toileting, grooming, dressing, etc.)- Assess/evaluate cause of self-care deficits - Assess range of motion- Assess patient's mobility; develop plan if impaired- Assess patient's need for assistive devices and provide as  appropriate- Encourage maximum independence but intervene and supervise when necessary- Involve family in performance of ADLs- Assess for home care needs following discharge - Consider OT consult to assist with ADL evaluation and planning for discharge- Provide patient education as appropriate  4/25/2025 0807 by Evelyn Balbuena-Reyes, RN  Outcome: Progressing  4/25/2025 0807 by Evelyn Balbuena-Reyes, RN  Outcome: Progressing  Goal: Maintains/Returns to pre admission functional level  Description: INTERVENTIONS:- Perform AM-PAC 6 Click Basic Mobility/ Daily Activity assessment daily.- Set and communicate daily mobility goal to care team and patient/family/caregiver. - Collaborate with rehabilitation services on mobility goals if consulted- Perform Range of Motion 4 times a day.- Reposition patient every 2 hours.- Dangle patient 3 times a day- Stand patient 3 times a day- Ambulate patient 3 times a day- Out of bed to chair 3 times a day - Out of bed for meals 3 times a day- Out of bed for toileting- Record patient progress and toleration of activity level   4/25/2025 0807 by Evelyn Balbuena-Reyes, RN  Outcome: Progressing  4/25/2025 0807 by Evelyn Balbuena-Reyes, RN  Outcome: Progressing     Problem: DISCHARGE PLANNING  Goal: Discharge to home or other facility with appropriate resources  Description: INTERVENTIONS:- Identify barriers to discharge w/patient and caregiver- Arrange for needed discharge resources and transportation as appropriate- Identify discharge learning needs (meds, wound care, etc.)- Arrange for interpretive services to assist at discharge as needed- Refer to Case Management Department for coordinating discharge planning if the patient needs post-hospital services based on physician/advanced practitioner order or complex needs related to functional status, cognitive ability, or social support system  4/25/2025 0807 by Evelyn Balbuena-Reyes, RN  Outcome: Progressing  4/25/2025 0807 by Leann  Balbuena-Reyes, RN  Outcome: Progressing     Problem: Knowledge Deficit  Goal: Patient/family/caregiver demonstrates understanding of disease process, treatment plan, medications, and discharge instructions  Description: Complete learning assessment and assess knowledge base.Interventions:- Provide teaching at level of understanding- Provide teaching via preferred learning methods  4/25/2025 0807 by Evelyn Balbuena-Reyes, RN  Outcome: Progressing  4/25/2025 0807 by Evelyn Balbuena-Reyes, RN  Outcome: Progressing     Problem: RESPIRATORY - ADULT  Goal: Achieves optimal ventilation and oxygenation  Description: INTERVENTIONS:- Assess for changes in respiratory status- Assess for changes in mentation and behavior- Position to facilitate oxygenation and minimize respiratory effort- Oxygen administered by appropriate delivery if ordered- Initiate smoking cessation education as indicated- Encourage broncho-pulmonary hygiene including cough, deep breathe, Incentive Spirometry- Assess the need for suctioning and aspirate as needed- Assess and instruct to report SOB or any respiratory difficulty- Respiratory Therapy support as indicated  4/25/2025 0807 by Evelyn Balbuena-Reyes, RN  Outcome: Progressing  4/25/2025 0807 by Evelyn Balbuena-Reyes, RN  Outcome: Progressing     Problem: GASTROINTESTINAL - ADULT  Goal: Minimal or absence of nausea and/or vomiting  Description: INTERVENTIONS:- Administer IV fluids if ordered to ensure adequate hydration- Maintain NPO status until nausea and vomiting are resolved- Nasogastric tube if ordered- Administer ordered antiemetic medications as needed- Provide nonpharmacologic comfort measures as appropriate- Advance diet as tolerated, if ordered- Consider nutrition services referral to assist patient with adequate nutrition and appropriate food choices  4/25/2025 0807 by Evelyn Balbuena-Reyes, RN  Outcome: Progressing  4/25/2025 0807 by Evelyn Balbuena-Reyes, RN  Outcome: Progressing  Goal:  Maintains or returns to baseline bowel function  Description: INTERVENTIONS:- Assess bowel function- Encourage oral fluids to ensure adequate hydration- Administer IV fluids if ordered to ensure adequate hydration- Administer ordered medications as needed- Encourage mobilization and activity- Consider nutritional services referral to assist patient with adequate nutrition and appropriate food choices  4/25/2025 0807 by Evelyn Balbuena-Reyes, RN  Outcome: Progressing  4/25/2025 0807 by Evelyn Balbuena-Reyes, RN  Outcome: Progressing  Goal: Maintains adequate nutritional intake  Description: INTERVENTIONS:- Monitor percentage of each meal consumed- Identify factors contributing to decreased intake, treat as appropriate- Assist with meals as needed- Monitor I&O, weight, and lab values if indicated- Obtain nutrition services referral as needed  4/25/2025 0807 by Evelyn Balbuena-Reyes, RN  Outcome: Progressing  4/25/2025 0807 by Evelyn Balbuena-Reyes, RN  Outcome: Progressing  Goal: Establish and maintain optimal ostomy function  Description: INTERVENTIONS:- Assess bowel function- Encourage oral fluids to ensure adequate hydration- Administer IV fluids if ordered to ensure adequate hydration - Administer ordered medications as needed- Encourage mobilization and activity- Nutrition services referral to assist patient with appropriate food choices- Assess stoma site- Consider wound care consult   4/25/2025 0807 by Evelyn Balbuena-Reyes, RN  Outcome: Progressing  4/25/2025 0807 by Evelyn Balbuena-Reyes, RN  Outcome: Progressing  Goal: Oral mucous membranes remain intact  Description: INTERVENTIONS- Assess oral mucosa and hygiene practices- Implement preventative oral hygiene regimen- Implement oral medicated treatments as ordered- Initiate Nutrition services referral as needed  4/25/2025 0807 by Evelyn Balbuena-Reyes, RN  Outcome: Progressing  4/25/2025 0807 by Evelyn Balbuena-Reyes, RN  Outcome: Progressing     Problem:  METABOLIC, FLUID AND ELECTROLYTES - ADULT  Goal: Electrolytes maintained within normal limits  Description: INTERVENTIONS:- Monitor labs and assess patient for signs and symptoms of electrolyte imbalances- Administer electrolyte replacement as ordered- Monitor response to electrolyte replacements, including repeat lab results as appropriate- Instruct patient on fluid and nutrition as appropriate  4/25/2025 0807 by Evelyn Balbuena-Reyes, RN  Outcome: Progressing  4/25/2025 0807 by Evelyn Balbuena-Reyes, RN  Outcome: Progressing  Goal: Fluid balance maintained  Description: INTERVENTIONS:- Monitor labs - Monitor I/O and WT- Instruct patient on fluid and nutrition as appropriate- Assess for signs & symptoms of volume excess or deficit  4/25/2025 0807 by Evelyn Balbuena-Reyes, RN  Outcome: Progressing  4/25/2025 0807 by Evelyn Balbuena-Reyes, RN  Outcome: Progressing  Goal: Glucose maintained within target range  Description: INTERVENTIONS:- Monitor Blood Glucose as ordered- Assess for signs and symptoms of hyperglycemia and hypoglycemia- Administer ordered medications to maintain glucose within target range- Assess nutritional intake and initiate nutrition service referral as needed  4/25/2025 0807 by Evelyn Balbuena-Reyes, RN  Outcome: Progressing  4/25/2025 0807 by Evelyn Balbuena-Reyes, RN  Outcome: Progressing     Problem: Nutrition/Hydration-ADULT  Goal: Nutrient/Hydration intake appropriate for improving, restoring or maintaining nutritional needs  Description: Monitor and assess patient's nutrition/hydration status for malnutrition. Collaborate with interdisciplinary team and initiate plan and interventions as ordered.  Monitor patient's weight and dietary intake as ordered or per policy. Utilize nutrition screening tool and intervene as necessary. Determine patient's food preferences and provide high-protein, high-caloric foods as appropriate. INTERVENTIONS:- Monitor oral intake, urinary output, labs, and  treatment plans- Assess nutrition and hydration status and recommend course of action- Evaluate amount of meals eaten- Assist patient with eating if necessary - Allow adequate time for meals- Recommend/ encourage appropriate diets, oral nutritional supplements, and vitamin/mineral supplements- Order, calculate, and assess calorie counts as needed- Recommend, monitor, and adjust tube feedings and TPN/PPN based on assessed needs- Assess need for intravenous fluids- Provide specific nutrition/hydration education as appropriate- Include patient/family/caregiver in decisions related to nutrition  4/25/2025 0807 by Evelyn Balbuena-Reyes, RN  Outcome: Progressing  4/25/2025 0807 by Evelyn Balbuena-Reyes, RN  Outcome: Progressing

## 2025-04-25 NOTE — DISCHARGE SUMMARY
Discharge Summary - Mattie Varela, 1945, [id number]        Admission Date: 4/22/2025  Discharge Date: 4/25/2025      Discharge Diagnosis:   1.  Abdominal distention, no evidence of gastric outlet obstruction  2.  Mild acute kidney injury  3.  Chronic diastolic congestive heart failure  4.  Type 2 diabetes  5.  Chronic pain syndrome  6.  Hypothyroidism  7.  COPD  8.  Chronic urinary retention requiring Bateman catheterization    Consulting Physicians:  Dr. Anne, general surgery    Procedures Performed:   None    HPI: The patient is an 80-year-old woman with urinary retention and chronic Bateman catheterization.  On the evening of admission she had a bowel movement and her Bateman catheter was expelled in the process.  She was brought to the emergency room for further evaluation.  She was found to have significant abdominal distention.  A CT of her abdomen was performed that showed significant gastric dilatation and the possibility of gastric outlet obstruction was raised.  An NG tube was placed and she was admitted for further care.    Hospital Course: The patient was admitted to the hospital and gently hydrated with intravenous fluid.  NG suction was continued.  The patient's abdominal distention lessened as a result.  Really she was evaluated by general surgery who suggested a GI consult.  Gastroenterology thought the most appropriate way of evaluating this was with a barium study.  This was undertaken and no evidence for gastric outlet obstruction was discovered.  The patient's NG tube was removed.  Her diet was advanced without incident.  She was discharged for further outpatient care.    As mentioned, the patient has a chronic Bateman.  This became dislodged.  It was replaced in the emergency room without difficulty.    The patient's other medical issues remained stable during this hospitalization.  At the time of discharge she was feeling well.  Vital signs were stable.  Lungs were clear.  Cardiac exam  revealed a regular rhythm.  The abdomen was soft and nontender.  There was no edema.    Disposition: The patient was discharged to wound care on April 25.  Diet and activity will be as tolerated.  She was asked to arrange follow-up with her primary care physician within 1 week.    Discharge instructions/Information to patient and family:   See after visit summary for information provided to patient and family.      Provisions for Follow-Up Care:  See after visit summary for information related to follow-up care and any pertinent home health orders.      Planned Readmission: No    Discharge Statement   I spent 40 minutes discharging the patient. This time was spent on the day of discharge. I had direct contact with the patient on the day of discharge.     Discharge Medications:  See after visit summary for reconciled discharge medications provided to patient and family.

## 2025-04-25 NOTE — PLAN OF CARE
Problem: Prexisting or High Potential for Compromised Skin Integrity  Goal: Skin integrity is maintained or improved  Description: INTERVENTIONS:- Identify patients at risk for skin breakdown- Assess and monitor skin integrity- Assess and monitor nutrition and hydration status- Monitor labs - Assess for incontinence - Turn and reposition patient- Assist with mobility/ambulation- Relieve pressure over bony prominences- Avoid friction and shearing- Provide appropriate hygiene as needed including keeping skin clean and dry- Evaluate need for skin moisturizer/barrier cream- Collaborate with interdisciplinary team - Patient/family teaching- Consider wound care consult   Outcome: Progressing     Problem: INFECTION - ADULT  Goal: Absence or prevention of progression during hospitalization  Description: INTERVENTIONS:- Assess and monitor for signs and symptoms of infection- Monitor lab/diagnostic results- Monitor all insertion sites, i.e. indwelling lines, tubes, and drains- Monitor endotracheal if appropriate and nasal secretions for changes in amount and color- [place] appropriate cooling/warming therapies per order- Administer medications as ordered- Instruct and encourage patient and family to use good hand hygiene technique- Identify and instruct in appropriate isolation precautions for identified infection/condition  Outcome: Progressing  Goal: Absence of fever/infection during neutropenic period  Description: INTERVENTIONS:- Monitor WBC  Outcome: Progressing     Problem: DISCHARGE PLANNING  Goal: Discharge to home or other facility with appropriate resources  Description: INTERVENTIONS:- Identify barriers to discharge w/patient and caregiver- Arrange for needed discharge resources and transportation as appropriate- Identify discharge learning needs (meds, wound care, etc.)- Arrange for interpretive services to assist at discharge as needed- Refer to Case Management Department for coordinating discharge planning  if the patient needs post-hospital services based on physician/advanced practitioner order or complex needs related to functional status, cognitive ability, or social support system  Outcome: Progressing     Problem: Knowledge Deficit  Goal: Patient/family/caregiver demonstrates understanding of disease process, treatment plan, medications, and discharge instructions  Description: Complete learning assessment and assess knowledge base.Interventions:- Provide teaching at level of understanding- Provide teaching via preferred learning methods  Outcome: Progressing

## 2025-04-25 NOTE — PROGRESS NOTES
Progress Note - Surgery-General   Name: Mattie Varela 80 y.o. female I MRN: [id number]  Unit/Bed#: [place] [address] -01 I Date of Admission: 4/22/2025   Date of Service: 4/25/2025 I Hospital Day: 3    Assessment & Plan  Gastric outlet obstruction  Patient is an 80-year-old female with past medical history of CVA, hypertension, HFpEF, CAD, CKD, type 2 diabetes who presented with CT findings demonstrating marked distention of the stomach with gas and ingested material concerning for gastric outlet obstruction.    - Advance diet as tolerated  - GI: outpatient EGD  - Pain and nausea control as needed  - DVT ppx  - Rest of care per primary team    Please contact the SecureChat role for the Surgery-General service with any questions/concerns.    Subjective   No acute events overnight. Pain is well controlled. Denies N/V, fevers, chills, CP, SOB. Tolerating diet. Voiding well. Having BM and passing flatus.       Objective :  Temp:  [98.2 °F (36.8 °C)-99.5 °F (37.5 °C)] 98.9 °F (37.2 °C)  HR:  [] 94  BP: (120-152)/(67-90) 120/67  Resp:  [16-20] 18  SpO2:  [95 %-98 %] 95 %  O2 Device: None (Room air)    I/O         04/23 0701  04/24 0700 04/24 0701  04/25 0700 04/25 0701  04/26 0700    P.O. 0 960 240    NG/GT 30 120     Total Intake(mL/kg) 30 (0.4) 1080 (13.7) 240 (3.1)    Urine (mL/kg/hr) 2260 (1.2) 1550 (0.8)     Emesis/NG output 135      Total Output 2395 1550     Net -2365 -470 +240                 Lines/Drains/Airways       Active Status       Name Placement date Placement time Site Days    Urethral Catheter Latex 16 Fr. 04/22/25 2027  Latex  2                  Physical Exam  Vitals reviewed.   Constitutional:       Appearance: Normal appearance.   HENT:      Head: Normocephalic and atraumatic.      Right Ear: External ear normal.      Left Ear: External ear normal.      Nose: Nose normal.   Eyes:      Conjunctiva/sclera: Conjunctivae normal.   Cardiovascular:      Rate and Rhythm: Normal rate.      Comments:  Appears well perfused  Pulmonary:      Effort: Pulmonary effort is normal. No respiratory distress.   Abdominal:      General: Abdomen is flat. There is distension (Distension improved).      Palpations: Abdomen is soft.      Tenderness: There is no abdominal tenderness. There is no guarding or rebound.      Comments: No evidence of peritonitis   Skin:     General: Skin is warm and dry.   Neurological:      General: No focal deficit present.      Mental Status: She is alert and oriented to person, place, and time.   Psychiatric:         Mood and Affect: Mood normal.         Behavior: Behavior normal.           Lab Results: I have reviewed the following results:  Recent Labs     04/22/25 2036 04/23/25  0458   WBC 7.79 6.88   HGB 10.6* 9.7*   HCT 33.5* 31.7*    279   SODIUM 136 139   K 5.0 3.8   CL 95* 98   CO2 34* 34*   BUN 19 18   CREATININE 1.71* 1.39*   GLUC 168* 144*   AST 28  --    ALT 11  --    ALB 3.7  --    TBILI 0.31  --    ALKPHOS 132*  --        Imaging Results Review: No pertinent imaging studies reviewed.  Other Study Results Review: No additional pertinent studies reviewed.    VTE Pharmacologic Prophylaxis: Heparin  VTE Mechanical Prophylaxis: sequential compression device

## 2025-04-25 NOTE — TELEPHONE ENCOUNTER
Called and spoke with [facility], inform them of message. They mention there is two other options Golytely prep and Moviprep.    Spoke to pt and she had me call [facility] at [phone].    Lm for Yesenia at [facility] to cb to schedule EGD with Dr Arredondo in 4-6 weeks at [facility].  Also f/u appt needs to be scheduled 1 month after EGD.

## 2025-04-25 NOTE — CASE MANAGEMENT
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 (CODE) ([facility]) 2025     Multifocal atrial tachycardia ([facility]) 2025     Hyponatremia 2025     Gastro-esophageal reflux disease without esophagitis 10/19/2024     Acute kidney injury superimposed on chronic kidney disease  ([facility]) 10/18/2024     Constipation 2024     Chronic diastolic congestive heart failure ([facility]) 2024     s/p [employer] loop recorder 3/2/2024 2024     Moderate protein-calorie malnutrition ([facility]) 2024     Left ventricular outflow obstruction 2024     Obesity, Class I, BMI 30-34.9 2024     Non obstructive CAD 11/15/2023     Chronic obstructive pulmonary disease, unspecified COPD type ([facility]) 2023     Anemia in stage 3b chronic kidney disease  ([facility]) 2023     Cerebrovascular accident (CVA), unspecified mechanism ([facility]) 2022     Prepyloric ulcer 2021     Mild intermittent asthma without complication 2020     S/P insertion of spinal cord stimulator 2018     Iron deficiency anemia secondary to inadequate dietary iron intake 2018     Type 2 diabetes mellitus with other skin complications ([facility])      Hypothyroidism 2017     Hypercholesterolemia 2016     Anxiety 2015     Other chronic pain 2013     Hypertension 2013       LOS (days): 3  Geometric Mean LOS (GMLOS) (days): 3.2  Days to GMLOS:0.8     OBJECTIVE:  Risk of Unplanned Readmission Score: 43.76         Current admission status: Inpatient   Preferred Pharmacy:   [facility]. [place], [place] - [facility]  [address]  [address]  [address]  [facility] [address]  Phone: [phone] Fax: [phone]    [facility] [place] - [place], [place] - [address]  [facility],  [address]  [facility],  First Floor [place]  [place] [address]  Phone: [phone] Fax: [phone]    [facility] - [place], [place] - [address]  [address]  [place] [address]  Phone: [phone] Fax: [phone]    Primary Care Provider: Halley Clark MD    Primary Insurance: [facility]  Secondary Insurance:     DISCHARGE DETAILS:      Transport at Discharge : Wheelchair van     Number/Name of Dispatcher: [facility] [phone]     ETA of Transport (Date): 04/25/25  ETA of Transport (Time): 1030       Additional Comments: CM spoke with patient at bedside, patient agreeable to return to [facility] this morning, CM confirmed transportation request with [employer] for 10:30A.M, patient agreeable. CM left message for patient's [facility] , [place] (PH: [phone]. [phone]).  CM spoke with Aishwarya from patient's [facility], [facility] and confirmed patient discharge timing; Aishwarya agreeable. CM to follow for further discharge planning needs.

## 2025-04-25 NOTE — ASSESSMENT & PLAN NOTE
"Lab Results   Component Value Date    HGBA1C 7.4 (H) 10/12/2024     No results for input(s): \"POCGLU\" in the last 72 hours.  Blood Sugar Average: Last 72 hrs:    A1c above goal    Plan:  BG goal 140-200 while inpatient  SSI  Holding oral antihyperglycemics  "
80 y.o. female with history of hypertension, HFpEF (LVEF 51% on 11/22/2024), coronary artery disease on aspirin, stroke, class I obesity (BMI 33), CKD, peptic ulcer disease and type 2 diabetes as well as chronic pain due to degenerative lumbar spinal stenosis on opioids who presented on 4/22 due dislodged chronic Bateman catheter while having a bowel movement she had some gastric distention noted in the ED, but otherwise was feeling well without symptoms found to have elevated creatinine on admission 1.71 improving 1.39 on day 2 (baseline 0.9-1.2) with CT abdomen performed for distention showing marked distention of the stomach with gas and ingested material with duodenum and rest of small bowel normal caliber with moderate stool burden in colon overall concerning for gastric distention in the setting of opioid use, diabetes, recent meal less concerning for any functional gastroparesis as patient asymptomatic leading to GI consult.    - Gastric distention seen on CT likely in the setting of recent meal as well as diabetes, opioid use and constipation.  No clinical evidence of gastroparesis.  Patient says she often does eat smaller meals now, but overall has been feeling well without nausea, vomiting, weight loss  - Recommend bowel regiment with MiraLAX twice daily, senna daily  - Limit opioids as able  - No need for endoscopic evaluation if patient asymptomatic  - Defer timing of NG tube removal to surgery  - If patient tolerating diet starting with clear liquid diet then progressing to regular and okay for discharge and follow-up in GI clinic    
80 y.o. female with history of hypertension, HFpEF (LVEF 51% on 11/22/2024), coronary artery disease on aspirin, stroke, class I obesity (BMI 33), CKD, peptic ulcer disease and type 2 diabetes as well as chronic pain due to degenerative lumbar spinal stenosis on opioids who presented on 4/22 due dislodged chronic Bateman catheter while having a bowel movement she had some gastric distention noted in the ED, but otherwise was feeling well without symptoms found to have elevated creatinine on admission 1.71 improving 1.39 on day 2 (baseline 0.9-1.2) with CT abdomen performed for distention showing marked distention of the stomach with gas and ingested material with duodenum and rest of small bowel normal caliber with moderate stool burden in colon overall concerning for gastric distention in the setting of opioid use, diabetes, recent meal less concerning for any functional gastroparesis as patient asymptomatic leading to GI consult.    - Gastric distention seen on CT likely in the setting of recent meal as well as diabetes, opioid use and constipation.  No clinical evidence of gastroparesis.  Patient says she often does eat smaller meals now, but overall has been feeling well without nausea, vomiting, weight loss  - Recommend bowel regiment with MiraLAX twice daily, senna to be continued at discharge  - Limit opioids as able  - No need for endoscopic evaluation as patient asymptomatic  - Small bowel follow-through study showing rapid progression of contrast into duodenum and filling distal small intestine  - Defer timing of NG tube removal to surgery  - Okay for diet from GI standpoint  - If patient tolerating diet starting with clear liquid diet then progressing to regular okay for discharge from GI standpoint and follow-up in GI clinic    
CKD in setting of age-related, hypertensive, diabetic nephropathy. Suspect pre-renal SADAF in setting of poor PO intake, diuretic use  Baseline Cr: 1.4  Admit Cr: 1.7     Plan:  Isolyte overnight   Hold torsemide for now   Monitor renal function, renal dose medications, maintain normotension/euvolemia, avoid nephrotoxic agents, I&Os  
CKD in setting of age-related, hypertensive, diabetic nephropathy. Suspect pre-renal SADAF in setting of poor PO intake, diuretic use  Baseline Cr: 1.4  Admit Cr: 1.7   The patient's creatinine has returned to baseline.  Torsemide will be resumed tomorrow.  
Continue inhaled bronchodilators.  
Continue thyroid hormone replacement.  
Lab Results   Component Value Date    HGBA1C 7.4 (H) 04/23/2025       Recent Labs     04/23/25  2352 04/24/25  0017 04/24/25  0633 04/24/25  0652   POCGLU 37* 143* 55* 164*       Blood Sugar Average: Last 72 hrs:  (P) 115    
Lab Results   Component Value Date    HGBA1C 7.4 (H) 04/23/2025     Recent Labs     04/24/25  0017 04/24/25  0633 04/24/25  0652 04/24/25  1107   POCGLU 143* 55* 164* 147*     The patient's diabetes has been diet controlled before admission.  In light of the patient's age and overall medical condition, I believe that a hemoglobin of 7.4% represents excellent control.  Insulin scale will be canceled.  
Lab Results   Component Value Date    HGBA1C 7.4 (H) 10/12/2024       Recent Labs     04/23/25  0741   POCGLU 139       Blood Sugar Average: Last 72 hrs:  (P) 139    
Patient appears to have SADAF on CKD on admission with a creatinine of 1.71 which appears elevated from baseline of 1.4-1.5.     - Recommend IV fluid resuscitation  - Care per primary team  
Patient appears to have SADAF on CKD on admission with a creatinine of 1.71 which appears elevated from baseline of 1.4-1.5. Improved today.     - Recommend IV fluid resuscitation  - Care per primary team  
Patient appears to have SADAF on CKD with a creatinine of 1.71 which appears elevated from baseline of 1.4-1.5.    -Recommend IV fluid resuscitation  - Care per primary team  
Patient is an 80-year-old female with past medical history of CVA, hypertension, HFpEF, CAD, CKD, type 2 diabetes who presented with CT findings demonstrating marked distention of the stomach with gas and ingested material concerning for gastric outlet obstruction.    - Advance diet as tolerated  - GI: outpatient EGD  - Pain and nausea control as needed  - DVT ppx  - Rest of care per primary team  
Patient is an 80-year-old female with past medical history of CVA, hypertension, HFpEF, CAD, CKD, type 2 diabetes who presented with CT findings demonstrating marked distention of the stomach with gas and ingested material concerning for gastric outlet obstruction.    - Continue N.p.o., NG tube  - Plan for GI consultation for possible EGD to determine source of obstruction. Appreciate recommendations  - Pain and nausea control as needed  - DVT ppx  - Rest of care per primary team  
Patient is an 80-year-old female with past medical history of CVA, hypertension, HFpEF, CAD, CKD, type 2 diabetes who presented with CT findings demonstrating marked distention of the stomach with gas and ingested material concerning for gastric outlet obstruction.    - Continue N.p.o., NG tube  - Plan for UGI today. If not obstructed, tentatively plan for NGT removal  - GI: no emergent indication to endoscopy. Will wait for upper GI series. If no obstruction will try diet and eval EGD OP. If not tolerate diet then EGD  - Pain and nausea control as needed  - DVT ppx  - Rest of care per primary team  
Patient is an 80-year-old female with past medical history of CVA, hypertension, HFpEF, CAD, CKD, type 2 diabetes who presented with CT findings demonstrating marked distention of the stomach with gas and ingested material concerning for gastric outlet obstruction.    - No acute surgical interventions  - Recommend admission to the medicine service  - Will place NG tube due to risk of vomiting and aspiration with severe gastric distention  - N.p.o.  - Plan for GI consultation for possible EGD to determine source of obstruction.  - Pain and nausea control as needed  - DVT ppx  - Rest of care per primary team  
Presents with catheter dislodgement, found with significant stomach distension   CT abd/pelvis noting marked distension of stomach with gas/ingested material, normal small/large bowel    Plan:  Surgery recommendations appreciated, NGT placed, NPO  GI consult pending  Multimodal analgesia  
Presents with catheter dislodgement, found with significant stomach distension   CT abd/pelvis noting marked distension of stomach with gas/ingested material, normal small/large bowel    The patient underwent a barium study which has excluded gastric outlet obstruction.  Her NG tube has been discontinued.  Clear liquid diet has been started.  
With chronic pain disorder secondary to degenerative lumbar spinal stenosis   PDMP on morphine sulfate 15mg q6h, fentanyl patch    Plan:  Multimodal analgesia   The patient's NG tube has been discontinued so her usual analgesic regimen will be reestablished.  
With chronic pain disorder secondary to degenerative lumbar spinal stenosis   PDMP on morphine sulfate 15mg q6h, fentanyl patch    Plan:  Multimodal analgesia   Titrate IV analgesia while NPO  
Wt Readings from Last 3 Encounters:   04/23/25 78.6 kg (173 lb 4.5 oz)   04/11/25 79.8 kg (176 lb)   03/04/25 76.2 kg (168 lb)     Compensated  Outpatient regimen: Toprol 12.5mg daily, torsemide 30mg daily  
Wt Readings from Last 3 Encounters:   04/23/25 78.6 kg (173 lb 4.5 oz)   04/11/25 79.8 kg (176 lb)   03/04/25 76.2 kg (168 lb)     Compensated  Outpatient regimen: Toprol 12.5mg daily, torsemide 30mg daily    Plan:  Hold torsemide   Cardiac diet, weights, I&O, replenish electrolytes as needed  
Vaccine status unknown

## 2025-04-28 NOTE — UTILIZATION REVIEW
NOTIFICATION OF ADMISSION DISCHARGE   This is a Notification of Discharge from [facility]. Please be advised that this patient has been discharge from our facility. Below you will find the admission and discharge date and time including the patient’s disposition.   UTILIZATION REVIEW CONTACT:  Utilization Review Assistants  Network Utilization Review Department  Phone: [phone] x carefully listen to the prompts. All voicemails are confidential.  Email: [email or web address]     ADMISSION INFORMATION  PRESENTATION DATE: 4/22/2025  7:47 PM  OBERVATION ADMISSION DATE: N/A  INPATIENT ADMISSION DATE: 4/22/25 11:52 PM   DISCHARGE DATE: 4/25/2025 12:04 PM   DISPOSITION:Home/Self Care    Network Utilization Review Department  ATTENTION: Please call with any questions or concerns to [phone] and carefully listen to the prompts so that you are directed to the right person. All voicemails are confidential.   For Discharge needs, contact Care Management DC Support Team at [phone] opt. [phone]  Send all requests for admission clinical reviews, approved or denied determinations and any other requests to dedicated fax number below belonging to the campus where the patient is receiving treatment. List of dedicated fax numbers for the Facilities:  FACILITY NAME UR FAX NUMBER   ADMISSION DENIALS (Administrative/Medical Necessity) [phone]   DISCHARGE SUPPORT TEAM ([facility]) [phone]   PARENT CHILD HEALTH (Maternity/NICU/Pediatrics) [phone]   [facility] [phone]   [facility] [phone]   [facility] [phone]   [facility] [phone]   [facility] [phone]   [facility] [phone]   [facility] [phone]   [facility] [phone]   [facility]  [facility] [phone]   [facility] [phone]   [facility] [phone]   [facility] [phone]

## 2025-04-29 ENCOUNTER — HOSPITAL ENCOUNTER (OUTPATIENT)
Dept: MAMMOGRAPHY | Facility: CLINIC | Age: 80
Discharge: HOME | End: 2025-04-29
Payer: MEDICARE

## 2025-04-29 DIAGNOSIS — R92.8 ABNORMAL MAMMOGRAM: ICD-10-CM

## 2025-04-29 PROCEDURE — 76642 ULTRASOUND BREAST LIMITED: CPT

## 2025-05-14 ENCOUNTER — APPOINTMENT (EMERGENCY)
Dept: RADIOLOGY | Facility: HOSPITAL | Age: 80
DRG: 987 | End: 2025-05-14
Payer: MEDICARE

## 2025-05-14 ENCOUNTER — HOSPITAL ENCOUNTER (INPATIENT)
Facility: HOSPITAL | Age: 80
LOS: 4 days | Discharge: NURSING, REHAB OR LONG-TERM CARE | DRG: 987 | End: 2025-05-19
Attending: EMERGENCY MEDICINE | Admitting: INTERNAL MEDICINE
Payer: MEDICARE
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
## 2025-05-14 DIAGNOSIS — N18.9 CKD (CHRONIC KIDNEY DISEASE): ICD-10-CM

## 2025-05-14 DIAGNOSIS — H57.12 EYE PAIN, LEFT: ICD-10-CM

## 2025-05-14 DIAGNOSIS — R42 LIGHTHEADEDNESS: ICD-10-CM

## 2025-05-14 DIAGNOSIS — G89.29 CHRONIC PAIN: ICD-10-CM

## 2025-05-14 DIAGNOSIS — S81.819A LEG LACERATION: Primary | ICD-10-CM

## 2025-05-14 DIAGNOSIS — I50.32 CHRONIC DIASTOLIC CONGESTIVE HEART FAILURE (HCC): ICD-10-CM

## 2025-05-14 DIAGNOSIS — N17.9 AKI (ACUTE KIDNEY INJURY) (HCC): ICD-10-CM

## 2025-05-14 DIAGNOSIS — G89.29 OTHER CHRONIC PAIN: ICD-10-CM

## 2025-05-14 DIAGNOSIS — I95.9 HYPOTENSION: ICD-10-CM

## 2025-05-14 PROBLEM — R65.10 SIRS (SYSTEMIC INFLAMMATORY RESPONSE SYNDROME) (HCC): Status: ACTIVE | Noted: 2025-05-14

## 2025-05-14 LAB
2HR DELTA HS TROPONIN: 2 NG/L
ABO GROUP BLD: NORMAL
ALBUMIN SERPL BCG-MCNC: 3.2 G/DL (ref 3.5–5)
ALP SERPL-CCNC: 116 U/L (ref 34–104)
ALT SERPL W P-5'-P-CCNC: 8 U/L (ref 7–52)
AMORPH URATE CRY URNS QL MICRO: ABNORMAL
ANION GAP SERPL CALCULATED.3IONS-SCNC: 7 MMOL/L (ref 4–13)
APTT PPP: 28 SECONDS (ref 23–34)
AST SERPL W P-5'-P-CCNC: 14 U/L (ref 13–39)
BACTERIA UR QL AUTO: ABNORMAL /HPF
BASOPHILS # BLD AUTO: 0.05 THOUSANDS/ÂΜL (ref 0–0.1)
BASOPHILS NFR BLD AUTO: 0 % (ref 0–1)
BILIRUB SERPL-MCNC: 0.46 MG/DL (ref 0.2–1)
BILIRUB UR QL STRIP: NEGATIVE
BLD GP AB SCN SERPL QL: NEGATIVE
BUN SERPL-MCNC: 19 MG/DL (ref 5–25)
CALCIUM ALBUM COR SERPL-MCNC: 9.4 MG/DL (ref 8.3–10.1)
CALCIUM SERPL-MCNC: 8.8 MG/DL (ref 8.4–10.2)
CARDIAC TROPONIN I PNL SERPL HS: 11 NG/L (ref ?–50)
CARDIAC TROPONIN I PNL SERPL HS: 13 NG/L (ref ?–50)
CHLORIDE SERPL-SCNC: 92 MMOL/L (ref 96–108)
CLARITY UR: ABNORMAL
CO2 SERPL-SCNC: 36 MMOL/L (ref 21–32)
COLOR UR: YELLOW
CREAT SERPL-MCNC: 1.56 MG/DL (ref 0.6–1.3)
EOSINOPHIL # BLD AUTO: 0.17 THOUSAND/ÂΜL (ref 0–0.61)
EOSINOPHIL NFR BLD AUTO: 1 % (ref 0–6)
ERYTHROCYTE [DISTWIDTH] IN BLOOD BY AUTOMATED COUNT: 14.5 % (ref 11.6–15.1)
GFR SERPL CREATININE-BSD FRML MDRD: 30 ML/MIN/1.73SQ M
GLUCOSE SERPL-MCNC: 167 MG/DL (ref 65–140)
GLUCOSE UR STRIP-MCNC: NEGATIVE MG/DL
HCT VFR BLD AUTO: 28.5 % (ref 34.8–46.1)
HGB BLD-MCNC: 9.2 G/DL (ref 11.5–15.4)
HGB UR QL STRIP.AUTO: ABNORMAL
HYALINE CASTS #/AREA URNS LPF: ABNORMAL /LPF
IMM GRANULOCYTES # BLD AUTO: 0.08 THOUSAND/UL (ref 0–0.2)
IMM GRANULOCYTES NFR BLD AUTO: 1 % (ref 0–2)
INR PPP: 1.07 (ref 0.85–1.19)
KETONES UR STRIP-MCNC: NEGATIVE MG/DL
LACTATE SERPL-SCNC: 1.8 MMOL/L (ref 0.5–2)
LACTATE SERPL-SCNC: 2.2 MMOL/L (ref 0.5–2)
LEUKOCYTE ESTERASE UR QL STRIP: ABNORMAL
LYMPHOCYTES # BLD AUTO: 1.37 THOUSANDS/ÂΜL (ref 0.6–4.47)
LYMPHOCYTES NFR BLD AUTO: 10 % (ref 14–44)
MCH RBC QN AUTO: 28.2 PG (ref 26.8–34.3)
MCHC RBC AUTO-ENTMCNC: 32.3 G/DL (ref 31.4–37.4)
MCV RBC AUTO: 87 FL (ref 82–98)
MONOCYTES # BLD AUTO: 0.9 THOUSAND/ÂΜL (ref 0.17–1.22)
MONOCYTES NFR BLD AUTO: 6 % (ref 4–12)
MUCOUS THREADS UR QL AUTO: ABNORMAL
NEUTROPHILS # BLD AUTO: 11.45 THOUSANDS/ÂΜL (ref 1.85–7.62)
NEUTS SEG NFR BLD AUTO: 82 % (ref 43–75)
NITRITE UR QL STRIP: NEGATIVE
NON-SQ EPI CELLS URNS QL MICRO: ABNORMAL /HPF
NRBC BLD AUTO-RTO: 0 /100 WBCS
PH UR STRIP.AUTO: 6.5 [PH] (ref 4.5–8)
PLATELET # BLD AUTO: 229 THOUSANDS/UL (ref 149–390)
PMV BLD AUTO: 10.3 FL (ref 8.9–12.7)
POTASSIUM SERPL-SCNC: 3.2 MMOL/L (ref 3.5–5.3)
PROCALCITONIN SERPL-MCNC: <0.05 NG/ML
PROT SERPL-MCNC: 6.1 G/DL (ref 6.4–8.4)
PROT UR STRIP-MCNC: ABNORMAL MG/DL
PROTHROMBIN TIME: 14.1 SECONDS (ref 12.3–15)
RBC # BLD AUTO: 3.26 MILLION/UL (ref 3.81–5.12)
RBC #/AREA URNS AUTO: ABNORMAL /HPF
RH BLD: POSITIVE
SODIUM SERPL-SCNC: 135 MMOL/L (ref 135–147)
SP GR UR STRIP.AUTO: 1.02 (ref 1–1.03)
SPECIMEN EXPIRATION DATE: NORMAL
UROBILINOGEN UR QL STRIP.AUTO: 0.2 E.U./DL
WBC # BLD AUTO: 14.02 THOUSAND/UL (ref 4.31–10.16)
WBC #/AREA URNS AUTO: ABNORMAL /HPF
WBC CLUMPS # UR AUTO: PRESENT /UL

## 2025-05-14 PROCEDURE — 96365 THER/PROPH/DIAG IV INF INIT: CPT

## 2025-05-14 PROCEDURE — 86900 BLOOD TYPING SEROLOGIC ABO: CPT | Performed by: EMERGENCY MEDICINE

## 2025-05-14 PROCEDURE — 87081 CULTURE SCREEN ONLY: CPT | Performed by: STUDENT IN AN ORGANIZED HEALTH CARE EDUCATION/TRAINING PROGRAM

## 2025-05-14 PROCEDURE — 87086 URINE CULTURE/COLONY COUNT: CPT

## 2025-05-14 PROCEDURE — 85610 PROTHROMBIN TIME: CPT | Performed by: EMERGENCY MEDICINE

## 2025-05-14 PROCEDURE — 87077 CULTURE AEROBIC IDENTIFY: CPT

## 2025-05-14 PROCEDURE — 99284 EMERGENCY DEPT VISIT MOD MDM: CPT

## 2025-05-14 PROCEDURE — 83605 ASSAY OF LACTIC ACID: CPT | Performed by: EMERGENCY MEDICINE

## 2025-05-14 PROCEDURE — 81001 URINALYSIS AUTO W/SCOPE: CPT

## 2025-05-14 PROCEDURE — 85025 COMPLETE CBC W/AUTO DIFF WBC: CPT | Performed by: EMERGENCY MEDICINE

## 2025-05-14 PROCEDURE — 86923 COMPATIBILITY TEST ELECTRIC: CPT

## 2025-05-14 PROCEDURE — 84145 PROCALCITONIN (PCT): CPT | Performed by: EMERGENCY MEDICINE

## 2025-05-14 PROCEDURE — 80053 COMPREHEN METABOLIC PANEL: CPT | Performed by: EMERGENCY MEDICINE

## 2025-05-14 PROCEDURE — 86901 BLOOD TYPING SEROLOGIC RH(D): CPT | Performed by: EMERGENCY MEDICINE

## 2025-05-14 PROCEDURE — 84484 ASSAY OF TROPONIN QUANT: CPT | Performed by: EMERGENCY MEDICINE

## 2025-05-14 PROCEDURE — 85730 THROMBOPLASTIN TIME PARTIAL: CPT | Performed by: EMERGENCY MEDICINE

## 2025-05-14 PROCEDURE — 93005 ELECTROCARDIOGRAM TRACING: CPT

## 2025-05-14 PROCEDURE — 0JQP0ZZ REPAIR LEFT LOWER LEG SUBCUTANEOUS TISSUE AND FASCIA, OPEN APPROACH: ICD-10-PCS | Performed by: INTERNAL MEDICINE

## 2025-05-14 PROCEDURE — 87186 SC STD MICRODIL/AGAR DIL: CPT

## 2025-05-14 PROCEDURE — 99223 1ST HOSP IP/OBS HIGH 75: CPT | Performed by: STUDENT IN AN ORGANIZED HEALTH CARE EDUCATION/TRAINING PROGRAM

## 2025-05-14 PROCEDURE — 86850 RBC ANTIBODY SCREEN: CPT | Performed by: EMERGENCY MEDICINE

## 2025-05-14 PROCEDURE — 36415 COLL VENOUS BLD VENIPUNCTURE: CPT | Performed by: EMERGENCY MEDICINE

## 2025-05-14 PROCEDURE — 71046 X-RAY EXAM CHEST 2 VIEWS: CPT

## 2025-05-14 RX ORDER — FAMOTIDINE 20 MG/1
20 TABLET, FILM COATED ORAL DAILY
Status: DISCONTINUED | OUTPATIENT
Start: 2025-05-15 | End: 2025-05-14 | Stop reason: DRUGHIGH

## 2025-05-14 RX ORDER — FENTANYL 75 UG/1
1 PATCH TRANSDERMAL
Status: DISCONTINUED | OUTPATIENT
Start: 2025-05-14 | End: 2025-05-19 | Stop reason: HOSPADM

## 2025-05-14 RX ORDER — LOPERAMIDE HCL 1 MG/7.5ML
2 SUSPENSION ORAL 3 TIMES DAILY PRN
Status: DISCONTINUED | OUTPATIENT
Start: 2025-05-14 | End: 2025-05-19 | Stop reason: HOSPADM

## 2025-05-14 RX ORDER — ATORVASTATIN CALCIUM 40 MG/1
40 TABLET, FILM COATED ORAL
Status: DISCONTINUED | OUTPATIENT
Start: 2025-05-14 | End: 2025-05-19 | Stop reason: HOSPADM

## 2025-05-14 RX ORDER — ZOLPIDEM TARTRATE 5 MG/1
5 TABLET ORAL
Status: DISCONTINUED | OUTPATIENT
Start: 2025-05-14 | End: 2025-05-19 | Stop reason: HOSPADM

## 2025-05-14 RX ORDER — MORPHINE SULFATE 15 MG/1
15 TABLET ORAL EVERY 6 HOURS
Status: DISCONTINUED | OUTPATIENT
Start: 2025-05-14 | End: 2025-05-19 | Stop reason: HOSPADM

## 2025-05-14 RX ORDER — ACETAMINOPHEN 325 MG/1
650 TABLET ORAL EVERY 6 HOURS PRN
Status: DISCONTINUED | OUTPATIENT
Start: 2025-05-14 | End: 2025-05-19 | Stop reason: HOSPADM

## 2025-05-14 RX ORDER — MORPHINE SULFATE 15 MG/1
15 TABLET ORAL EVERY 4 HOURS PRN
Refills: 0 | Status: DISCONTINUED | OUTPATIENT
Start: 2025-05-14 | End: 2025-05-14

## 2025-05-14 RX ORDER — LATANOPROST 50 UG/ML
1 SOLUTION/ DROPS OPHTHALMIC
Status: DISCONTINUED | OUTPATIENT
Start: 2025-05-14 | End: 2025-05-19 | Stop reason: HOSPADM

## 2025-05-14 RX ORDER — SODIUM CHLORIDE, SODIUM GLUCONATE, SODIUM ACETATE, POTASSIUM CHLORIDE, MAGNESIUM CHLORIDE, SODIUM PHOSPHATE, DIBASIC, AND POTASSIUM PHOSPHATE .53; .5; .37; .037; .03; .012; .00082 G/100ML; G/100ML; G/100ML; G/100ML; G/100ML; G/100ML; G/100ML
500 INJECTION, SOLUTION INTRAVENOUS ONCE
Status: COMPLETED | OUTPATIENT
Start: 2025-05-14 | End: 2025-05-14

## 2025-05-14 RX ORDER — ALBUTEROL SULFATE 90 UG/1
2 INHALANT RESPIRATORY (INHALATION) EVERY 6 HOURS PRN
Status: DISCONTINUED | OUTPATIENT
Start: 2025-05-14 | End: 2025-05-19 | Stop reason: HOSPADM

## 2025-05-14 RX ORDER — FAMOTIDINE 20 MG/1
20 TABLET, FILM COATED ORAL DAILY
Status: DISCONTINUED | OUTPATIENT
Start: 2025-05-15 | End: 2025-05-17

## 2025-05-14 RX ORDER — METOPROLOL SUCCINATE 25 MG/1
12.5 TABLET, EXTENDED RELEASE ORAL DAILY
Status: DISCONTINUED | OUTPATIENT
Start: 2025-05-14 | End: 2025-05-19 | Stop reason: HOSPADM

## 2025-05-14 RX ORDER — DOCUSATE SODIUM 100 MG/1
100 CAPSULE, LIQUID FILLED ORAL 2 TIMES DAILY
Status: DISCONTINUED | OUTPATIENT
Start: 2025-05-14 | End: 2025-05-19 | Stop reason: HOSPADM

## 2025-05-14 RX ORDER — LEVOTHYROXINE SODIUM 75 UG/1
37.5 TABLET ORAL
Status: DISCONTINUED | OUTPATIENT
Start: 2025-05-14 | End: 2025-05-19 | Stop reason: HOSPADM

## 2025-05-14 RX ORDER — FERROUS SULFATE 325(65) MG
325 TABLET ORAL
Status: DISCONTINUED | OUTPATIENT
Start: 2025-05-15 | End: 2025-05-18

## 2025-05-14 RX ORDER — POTASSIUM CHLORIDE 1500 MG/1
40 TABLET, EXTENDED RELEASE ORAL ONCE
Status: DISCONTINUED | OUTPATIENT
Start: 2025-05-14 | End: 2025-05-14

## 2025-05-14 RX ORDER — METHOCARBAMOL 750 MG/1
750 TABLET, FILM COATED ORAL EVERY 8 HOURS SCHEDULED
Status: DISCONTINUED | OUTPATIENT
Start: 2025-05-14 | End: 2025-05-19 | Stop reason: HOSPADM

## 2025-05-14 RX ORDER — HEPARIN SODIUM 5000 [USP'U]/ML
5000 INJECTION, SOLUTION INTRAVENOUS; SUBCUTANEOUS EVERY 8 HOURS SCHEDULED
Status: DISCONTINUED | OUTPATIENT
Start: 2025-05-14 | End: 2025-05-19 | Stop reason: HOSPADM

## 2025-05-14 RX ORDER — LIDOCAINE 50 MG/G
1 PATCH TOPICAL DAILY
Status: DISCONTINUED | OUTPATIENT
Start: 2025-05-14 | End: 2025-05-19 | Stop reason: HOSPADM

## 2025-05-14 RX ORDER — GABAPENTIN 300 MG/1
300 CAPSULE ORAL
Status: DISCONTINUED | OUTPATIENT
Start: 2025-05-14 | End: 2025-05-19 | Stop reason: HOSPADM

## 2025-05-14 RX ORDER — FAMOTIDINE 20 MG/1
20 TABLET, FILM COATED ORAL 2 TIMES DAILY
Status: DISCONTINUED | OUTPATIENT
Start: 2025-05-14 | End: 2025-05-14

## 2025-05-14 RX ORDER — ASPIRIN 81 MG/1
81 TABLET, CHEWABLE ORAL DAILY
Status: DISCONTINUED | OUTPATIENT
Start: 2025-05-14 | End: 2025-05-19 | Stop reason: HOSPADM

## 2025-05-14 RX ORDER — POTASSIUM CHLORIDE 1500 MG/1
20 TABLET, EXTENDED RELEASE ORAL DAILY
Status: DISCONTINUED | OUTPATIENT
Start: 2025-05-15 | End: 2025-05-19 | Stop reason: HOSPADM

## 2025-05-14 RX ORDER — OXYBUTYNIN CHLORIDE 5 MG/1
5 TABLET ORAL 2 TIMES DAILY
Status: DISCONTINUED | OUTPATIENT
Start: 2025-05-14 | End: 2025-05-19 | Stop reason: HOSPADM

## 2025-05-14 RX ORDER — ONDANSETRON 2 MG/ML
4 INJECTION INTRAMUSCULAR; INTRAVENOUS EVERY 6 HOURS PRN
Status: DISCONTINUED | OUTPATIENT
Start: 2025-05-14 | End: 2025-05-19 | Stop reason: HOSPADM

## 2025-05-14 RX ORDER — ACETAMINOPHEN 325 MG/1
975 TABLET ORAL ONCE
Status: COMPLETED | OUTPATIENT
Start: 2025-05-14 | End: 2025-05-14

## 2025-05-14 RX ORDER — LIDOCAINE HYDROCHLORIDE 10 MG/ML
10 INJECTION, SOLUTION EPIDURAL; INFILTRATION; INTRACAUDAL; PERINEURAL ONCE
Status: COMPLETED | OUTPATIENT
Start: 2025-05-14 | End: 2025-05-14

## 2025-05-14 RX ADMIN — HEPARIN SODIUM 5000 UNITS: 5000 INJECTION INTRAVENOUS; SUBCUTANEOUS at 16:55

## 2025-05-14 RX ADMIN — ASPIRIN 81 MG: 81 TABLET, CHEWABLE ORAL at 16:55

## 2025-05-14 RX ADMIN — LATANOPROST 1 DROP: 50 SOLUTION OPHTHALMIC at 21:17

## 2025-05-14 RX ADMIN — METHOCARBAMOL 750 MG: 750 TABLET ORAL at 21:17

## 2025-05-14 RX ADMIN — ZOLPIDEM TARTRATE 5 MG: 5 TABLET, FILM COATED ORAL at 21:43

## 2025-05-14 RX ADMIN — ACETAMINOPHEN 975 MG: 325 TABLET, FILM COATED ORAL at 09:28

## 2025-05-14 RX ADMIN — ERTAPENEM SODIUM 1000 MG: 1 INJECTION INTRAMUSCULAR; INTRAVENOUS at 17:07

## 2025-05-14 RX ADMIN — SODIUM CHLORIDE, SODIUM GLUCONATE, SODIUM ACETATE, POTASSIUM CHLORIDE, MAGNESIUM CHLORIDE, SODIUM PHOSPHATE, DIBASIC, AND POTASSIUM PHOSPHATE 500 ML: .53; .5; .37; .037; .03; .012; .00082 INJECTION, SOLUTION INTRAVENOUS at 09:07

## 2025-05-14 RX ADMIN — ONDANSETRON 4 MG: 2 INJECTION INTRAMUSCULAR; INTRAVENOUS at 21:17

## 2025-05-14 RX ADMIN — MORPHINE SULFATE 15 MG: 15 TABLET ORAL at 23:45

## 2025-05-14 RX ADMIN — ATORVASTATIN CALCIUM 40 MG: 80 TABLET, FILM COATED ORAL at 16:55

## 2025-05-14 RX ADMIN — GABAPENTIN 300 MG: 300 CAPSULE ORAL at 21:17

## 2025-05-14 RX ADMIN — OXYBUTYNIN CHLORIDE 5 MG: 5 TABLET ORAL at 21:17

## 2025-05-14 RX ADMIN — MORPHINE SULFATE 15 MG: 15 TABLET ORAL at 12:01

## 2025-05-14 RX ADMIN — LIDOCAINE 5% 1 PATCH: 700 PATCH TOPICAL at 16:55

## 2025-05-14 RX ADMIN — LIDOCAINE HYDROCHLORIDE 10 ML: 10 INJECTION, SOLUTION EPIDURAL; INFILTRATION; INTRACAUDAL at 07:55

## 2025-05-14 RX ADMIN — METHOCARBAMOL 750 MG: 750 TABLET ORAL at 16:55

## 2025-05-14 RX ADMIN — MORPHINE SULFATE 15 MG: 15 TABLET ORAL at 18:10

## 2025-05-14 RX ADMIN — HEPARIN SODIUM 5000 UNITS: 5000 INJECTION INTRAVENOUS; SUBCUTANEOUS at 21:16

## 2025-05-14 NOTE — ED PROVIDER NOTES
ED Disposition       None          Assessment & Plan   {Hyperlinks  Risk Stratification - NIHSS - HEART SCORE - Fill out sepsis note and make sure you call [phone] if severe or septic shock:[id number]}    Medical Decision Making  Amount and/or Complexity of Data Reviewed  Labs: ordered.  Radiology: ordered.    Risk  OTC drugs.  Prescription drug management.             Medications   morphine (MSIR) IR tablet 15 mg (has no administration in time range)   lidocaine (PF) (XYLOCAINE-MPF) 1 % injection 10 mL (10 mL Infiltration Given by Other 5/14/25 7745)   multi-electrolyte (Plasmalyte-A/Isolyte-S PH 7.4/Normosol-R) IV bolus 500 mL (0 mL Intravenous Stopped 5/14/25 1004)   acetaminophen (TYLENOL) tablet 975 mg (975 mg Oral Given 5/14/25 0928)       ED Risk Strat Scores                    No data recorded        SBIRT 20yo+      Flowsheet Row Most Recent Value   Initial Alcohol Screen: US AUDIT-C     1. How often do you have a drink containing alcohol? 0 Filed at: 05/14/2025 0736   2. How many drinks containing alcohol do you have on a typical day you are drinking?  0 Filed at: 05/14/2025 0736   3b. FEMALE Any Age, or MALE 65+: How often do you have 4 or more drinks on one occassion? 0 Filed at: 05/14/2025 0736   Audit-C Score 0 Filed at: 05/14/2025 0736   DUNCAN: How many times in the past year have you...    Used an illegal drug or used a prescription medication for non-medical reasons? Never Filed at: 05/14/2025 0736                            History of Present Illness   {Hyperlinks  History (Med, Surg, Fam, Social) - Current Medications - Allergies  :[id number]}    Chief Complaint   Patient presents with    Laceration     Pt was walking with walker this am when she fell back, slicing right lower extremity around the ankle and approx mid calf, thinks she cut it on a metal basket that was nearby. Significant bleeding that is now controlled, deep lacerations noted. Takes daily aspirin        Past Medical History:    Diagnosis Date    Acid reflux     Acute kidney injury (HCC) 06/18/2022    Anemia     hx of iron-deficient    Anxiety     Arthritis     Asthma     last needed inhaler last year 2020    Basal cell carcinoma     upper lip    Chronic narcotic dependence (HCC)     Chronic pain     Colitis 11/19/2024    Colon polyp     Cystocele     Diverticulosis     Dizziness     at times    Dysfunctional uterine bleeding     last assessed - [id number]    Dysphagia     Fibromyalgia     Gastric ulcer     Gastroparesis     History of colonic polyps     last assessed - [id number]    History of gastroesophageal reflux (GERD)     Hypercholesterolemia     Hyperlipidemia     Hypertension     Hyponatremia 01/13/2024    IBS (irritable bowel syndrome)     Pneumobilia 06/18/2022    Post laminectomy syndrome     S/P insertion of spinal cord stimulator 07/18/2018    s/p [employer] loop recorder 3/2/2024 03/02/2024    Seasonal allergies     Shortness of breath     exertional    Spinal stenosis     Status post lumbar spinal fusion 03/16/2018    Stroke ([facility])     pt states slight stroke March 2022      Past Surgical History:   Procedure Laterality Date    APPENDECTOMY      BACK SURGERY      BREAST CYST EXCISION Left     CARDIAC CATHETERIZATION  11/14/2023    Procedure: Cardiac catheterization;  Surgeon: Randolph Holt MD;  [facility];  Service: Cardiology    CARDIAC CATHETERIZATION N/A 11/14/2023    Procedure: Cardiac Coronary Angiogram;  Surgeon: Randloph Holt MD;  [facility];  Service: Cardiology    CARDIAC ELECTROPHYSIOLOGY PROCEDURE N/A 3/2/2024    Procedure: Cardiac loop recorder implant;  Surgeon: Edu Fontaine MD;  [facility]: [facility];  Service: Cardiology    CHOLECYSTECTOMY      COLONOSCOPY      ESOPHAGOGASTRODUODENOSCOPY N/A 09/28/2016    Procedure: ESOPHAGOGASTRODUODENOSCOPY (EGD);  Surgeon: Mylene Moeller MD;  Location: [facility];  Service:     HERNIA REPAIR      HYSTERECTOMY      [facility]  age 28    LAMINECTOMY      LUMBAR LAMINECTOMY      OOPHORECTOMY      age 28    ND ARTHRODESIS POSTERIOR/PSTLAT TQ 1NTRSPC THORACIC N/A 06/04/2018    Procedure: Reopening of lumbar incision for T12-L5 posterior instrumented fixation and fusion and T12-L4 posterior decompression;  Surgeon: Wood Rogel MD;  Location: [facility];  Service: Neurosurgery    ND COLONOSCOPY FLX DX W/COLLJ SPEC WHEN PFRMD N/A 03/02/2016    Procedure: EGD AND COLONOSCOPY;  Surgeon: Mylene Moeller MD;  [facility]: [facility];  Service: Gastroenterology    ND DILATION ESOPH UNGUIDED SOUND/BOUGIE 1/MULT PASS N/A 09/28/2016    Procedure: DILATATION ESOPHAGEAL;  Surgeon: Mylene Moeller MD;  [facility]: [facility];  Service: Gastroenterology    ND ESOPHAGOGASTRODUODENOSCOPY TRANSORAL DIAGNOSTIC N/A 07/18/2016    Procedure: ESOPHAGOGASTRODUODENOSCOPY (EGD);  Surgeon: Mylene Moeller MD;  [facility]: [facility];  Service: Gastroenterology    ND INSJ/RPLCMT SPINAL NPG/RCVR POCKET CRTJ&CONNJ Left 06/04/2018    Procedure: removal of left buttock implantable pulse generator and placement of new  implantable pulse generator;  Surgeon: Wood Rogel MD;  Location: [facility];  Service: Neurosurgery      Family History   Problem Relation Age of Onset    Lung cancer Mother 46    Pulmonary embolism Father     No Known Problems Sister     No Known Problems Daughter     No Known Problems Daughter     Stroke Maternal Grandmother     Heart attack Maternal Grandfather     No Known Problems Paternal Grandmother     No Known Problems Paternal Grandfather     No Known Problems Maternal Aunt     Diabetes Family         Diabetes mellitus    Hypertension Family     Stroke Family         Stroke complications      Social History[1]   E-Cigarette/Vaping    E-Cigarette Use Never User       E-Cigarette/Vaping Substances    Nicotine No     THC No     CBD No     Flavoring No     Other No     Unknown No       I have reviewed and agree with the history as documented.     Mattie is a very  pleasant 80-year-old female here for evaluation of a left lower extremity laceration.  States she was ambulating with her walker today when she lost her balance and fell backwards.  She think she cut her leg on a piece of metal from some sort of basket that was nearby.  Says she thinks the fall was purely mechanical but states that she does feel little bit dizzy right now.  No recent fevers.  She does have a chronic indwelling Bateman catheter.  Has been having some nonbloody, nonbilious diarrhea for a week or 2.  Denies chest pain or shortness of breath but does get dyspneic with exertion sometimes.  Lives at [facility].      Laceration      Review of Systems        Objective   {Hyperlinks  Historical Vitals - Historical Labs - Chart Review/Microbiology - Last Echo - Code Status  :[id number]}    ED Triage Vitals   Temperature Pulse Blood Pressure Respirations SpO2 Patient Position - Orthostatic VS   05/14/25 0742 05/14/25 0736 05/14/25 0736 05/14/25 0736 05/14/25 0736 05/14/25 0736   98.3 °F (36.8 °C) 100 97/50 17 96 % Sitting      Temp Source Heart Rate Source BP Location FiO2 (%) Pain Score    05/14/25 0742 05/14/25 0736 05/14/25 0736 -- 05/14/25 0928    Oral Monitor Right arm  9      Vitals      Date and Time Temp Pulse SpO2 Resp BP Pain Score FACES Pain Rating User   05/14/25 1045 -- 88 95 % 15 105/58 -- -- VIV   05/14/25 1015 -- 86 95 % 12 112/55 -- -- VIV   05/14/25 0928 -- -- -- -- -- 9 -- VIV   05/14/25 0915 -- 88 -- 16 113/57 -- --    05/14/25 0911 -- 89 95 % 16 113/57 -- -- VIV   05/14/25 0845 -- 87 96 % 16 92/47 swank at bedside -- -- VIV   05/14/25 0742 98.3 °F (36.8 °C) -- -- -- -- -- -- VIV   05/14/25 0736 -- 100 96 % 17 97/50 -- -- VIV            Physical Exam    Results Reviewed       Procedure Component Value Units Date/Time    Procalcitonin [[id number]]  (Normal) Collected: 05/14/25 0854    Lab Status: Final result Specimen: Blood from Arm, Left Updated: 05/14/25 1000     Procalcitonin <0.05 ng/ml      Urine Microscopic [[id number]] Collected: 05/14/25 0945    Lab Status: In process Specimen: Urine, Indwelling Bateman Catheter Updated: 05/14/25 0948    Urine Macroscopic, POC [[id number]]  (Abnormal) Collected: 05/14/25 0945    Lab Status: Final result Specimen: Urine Updated: 05/14/25 0947     Color, UA Yellow     Clarity, UA Cloudy     pH, UA 6.5     Leukocytes, UA Small     Nitrite, UA Negative     Protein, UA 30 (1+) mg/dl      Glucose, UA Negative mg/dl      Ketones, UA Negative mg/dl      Urobilinogen, UA 0.2 E.U./dl      Bilirubin, UA Negative     Occult Blood, UA Moderate     Specific Gravity, UA 1.020    Narrative:      CLINITEK RESULT    Comprehensive metabolic panel [[id number]]  (Abnormal) Collected: 05/14/25 0854    Lab Status: Final result Specimen: Blood from Arm, Left Updated: 05/14/25 0930     Sodium 135 mmol/L      Potassium 3.2 mmol/L      Chloride 92 mmol/L      CO2 36 mmol/L      ANION GAP 7 mmol/L      BUN 19 mg/dL      Creatinine 1.56 mg/dL      Glucose 167 mg/dL      Calcium 8.8 mg/dL      Corrected Calcium 9.4 mg/dL      AST 14 U/L      ALT 8 U/L      Alkaline Phosphatase 116 U/L      Total Protein 6.1 g/dL      Albumin 3.2 g/dL      Total Bilirubin 0.46 mg/dL      eGFR 30 ml/min/1.73sq m     Narrative:      National Kidney Disease Foundation guidelines for Chronic Kidney Disease (CKD):     Stage 1 with normal or high GFR (GFR > 90 mL/min/1.73 square meters)    Stage 2 Mild CKD (GFR = 60-89 mL/min/1.73 square meters)    Stage 3A Moderate CKD (GFR = 45-59 mL/min/1.73 square meters)    Stage 3B Moderate CKD (GFR = 30-44 mL/min/1.73 square meters)    Stage 4 Severe CKD (GFR = 15-29 mL/min/1.73 square meters)    Stage 5 End Stage CKD (GFR <15 mL/min/1.73 square meters)  Note: GFR calculation is accurate only with a steady state creatinine    HS Troponin 0hr (reflex protocol) [[id number]]  (Normal) Collected: 05/14/25 0854    Lab Status: Final result Specimen: Blood from Arm, Left Updated: 05/14/25  0927     hs TnI 0hr 11 ng/L     HS Troponin I 2hr [[id number]]     Lab Status: No result Specimen: Blood     Lactic acid, plasma (w/reflex if result > 2.0) [[id number]]  (Abnormal) Collected: 05/14/25 0854    Lab Status: Final result Specimen: Blood from Arm, Left Updated: 05/14/25 0924     LACTIC ACID 2.2 mmol/L     Narrative:      Result may be elevated if tourniquet was used during collection.    Lactic acid 2 Hours [[id number]]     Lab Status: No result Specimen: Blood     Protime-INR [[id number]]  (Normal) Collected: 05/14/25 0854    Lab Status: Final result Specimen: Blood from Arm, Left Updated: 05/14/25 0918     Protime 14.1 seconds      INR 1.07    Narrative:      INR Therapeutic Range    Indication                                             INR Range      Atrial Fibrillation                                               2.0-3.0  Hypercoagulable State                                    2.0.2.3  Left Ventricular Asist Device                            2.0-3.0  Mechanical Heart Valve                                  -    Aortic(with afib, MI, embolism, HF, LA enlargement,    and/or coagulopathy)                                     2.0-3.0 (2.5-3.5)     Mitral                                                             2.5-3.5  Prosthetic/Bioprosthetic Heart Valve               2.0-3.0  Venous thromboembolism (VTE: VT, PE        2.0-3.0    APTT [[id number]]  (Normal) Collected: 05/14/25 0854    Lab Status: Final result Specimen: Blood from Arm, Left Updated: 05/14/25 0918     PTT 28 seconds     CBC and differential [[id number]]  (Abnormal) Collected: 05/14/25 0854    Lab Status: Final result Specimen: Blood from Arm, Left Updated: 05/14/25 0902     WBC 14.02 Thousand/uL      RBC 3.26 Million/uL      Hemoglobin 9.2 g/dL      Hematocrit 28.5 %      MCV 87 fL      MCH 28.2 pg      MCHC 32.3 g/dL      RDW 14.5 %      MPV 10.3 fL      Platelets 229 Thousands/uL      nRBC 0 /100 WBCs      Segmented % 82 %      Immature  Grans % 1 %      Lymphocytes % 10 %      Monocytes % 6 %      Eosinophils Relative 1 %      Basophils Relative 0 %      Absolute Neutrophils 11.45 Thousands/µL      Absolute Immature Grans 0.08 Thousand/uL      Absolute Lymphocytes 1.37 Thousands/µL      Absolute Monocytes 0.90 Thousand/µL      Eosinophils Absolute 0.17 Thousand/µL      Basophils Absolute 0.05 Thousands/µL             XR chest 2 views   Final Interpretation by Edwige Marcial MD (05/14 0930)      Low lung volumes with bibasilar atelectasis on the lateral projection.            Workstation performed: [id number]             Procedures    ED Medication and Procedure Management   Prior to Admission Medications   Prescriptions Last Dose Informant Patient Reported? Taking?   Blood Glucose Monitoring Suppl (OneTouch Verio Reflect) w/Device KIT  Self No Yes   Sig: Check blood sugars twice daily. Please substitute with appropriate alternative as covered by patient's insurance. Dx: E11.65   Cholecalciferol (True Vitamin D3) 1.25 MG (04442 UT) capsule   Yes Yes   Sig: Take 50,000 Units by mouth in the morning.   Lidocaine 4 % PTCH  Self Yes Yes   Sig: Apply 4 % topically Apply 1 every morning to back and remove at bedtime   MAGNESIUM OXIDE 400 PO  Self Yes Yes   Sig: Take 400 mg by mouth in the morning and 400 mg before bedtime.   Milnacipran HCl (Savella) 100 MG TABS  Self No Yes   Sig: Take 1 tablet (100 mg total) by mouth daily   OneTouch Delica Lancets 33G MISC  Self No Yes   Sig: Check blood sugars twice daily. Please substitute with appropriate alternative as covered by patient's insurance. Dx: E11.65   acetaminophen (TYLENOL) 325 mg tablet  Self No Yes   Sig: Take 3 tablets (975 mg total) by mouth every 8 (eight) hours   albuterol (PROVENTIL HFA,VENTOLIN HFA) 90 mcg/act inhaler  Self No Yes   Sig: Inhale 2 puffs every 6 (six) hours as needed for wheezing or shortness of breath   aspirin 81 mg chewable tablet  Self Yes Yes   Sig: Chew 81 mg in the  morning.   atorvastatin (LIPITOR) 40 mg tablet  Self No Yes   Sig: TAKE ONE TABLET BY MOUTH ONCE DAILY   docusate sodium (COLACE) 100 mg capsule  Self No Yes   Sig: Take 1 capsule (100 mg total) by mouth 2 (two) times a day   ergocalciferol (VITAMIN D2) 50,000 units  Self Yes Yes   Sig: Take 50,000 Units by mouth Take I cap orally once monthly   famotidine (PEPCID) 20 mg tablet  Self Yes Yes   Sig: Take 20 mg by mouth in the morning and 20 mg in the evening. 1 tab orally twice daily as needed for heartburn/acid reflux.   fentaNYL (DURAGESIC) 75 mcg/hr  Self Yes Yes   Sig: Place 1 patch on the skin every third day Apply 1 patch every 3 days   ferrous sulfate 325 (65 Fe) mg tablet  Self No Yes   Sig: Take 1 tablet (325 mg total) by mouth daily with breakfast   gabapentin (NEURONTIN) 300 mg capsule  Self No Yes   Sig: Take 1 capsule (300 mg total) by mouth daily at bedtime   glucose blood (OneTouch Verio) test strip  Self No Yes   Sig: Check blood sugars twice daily. Please substitute with appropriate alternative as covered by patient's insurance. Dx: E11.65   latanoprost (XALATAN) 0.005 % ophthalmic solution  Self Yes Yes   Sig: Administer 1 drop to both eyes daily at bedtime   levothyroxine 75 mcg tablet  Self No No   Sig: Take 0.5 tablets (37.5 mcg total) by mouth daily in the early morning   lidocaine (LIDODERM) 5 %  Self No Yes   Sig: Apply 1 patch topically over 12 hours daily Remove & Discard patch within 12 hours or as directed by MD   loperamide (IMODIUM A-D) 2 MG tablet  Self Yes Yes   Sig: Take 2 mg by mouth as needed in the morning and 2 mg as needed at noon and 2 mg as needed in the evening and 2 mg as needed before bedtime for diarrhea.   methocarbamol (ROBAXIN) 750 mg tablet  Self Yes Yes   Sig: Take 750 mg by mouth every 8 (eight) hours 1 tab orally every 8 hours as needed for muscle spasms   metoprolol succinate (TOPROL-XL) 25 mg 24 hr tablet  Self No Yes   Sig: Take 0.5 tablets (12.5 mg total) by  mouth daily   morphine (MSIR) 15 mg tablet  Self No Yes   Sig: Take 1 tablet (15 mg total) by mouth every 6 (six) hours Max Daily Amount: 60 mg   nystatin (MYCOSTATIN) powder   No Yes   Sig: Apply topically 2 (two) times a day   ondansetron (ZOFRAN) 4 mg tablet  Self No Yes   Sig: Take 1 tablet (4 mg total) by mouth every 8 (eight) hours as needed for nausea or vomiting   polyethylene glycol (MIRALAX) 17 g packet  Self No Yes   Sig: Take 17 g by mouth daily   potassium chloride (Klor-Con M20) 20 mEq tablet   No Yes   Sig: Take 1 tablet (20 mEq total) by mouth daily   senna (SENOKOT) 8.6 mg  Self No Yes   Sig: Take 2 tablets (17.2 mg total) by mouth every morning   simethicone (MYLICON) 80 mg chewable tablet  Self Yes No   Sig: Chew 80 mg every 6 (six) hours as needed for flatulence Chew and swallow 1 tab orally 3 times daily as needed for gas   torsemide (DEMADEX) 10 mg tablet Not Taking  No No   Sig: Increase to 60 mg BID for 5 days, then decrease to 40 mg BID   Patient not taking: Reported on 2025   torsemide (DEMADEX) 20 mg tablet  Self Yes Yes   Sig: Take 20 mg by mouth if needed (leg swelling)   trospium chloride (SANCTURA) 20 mg tablet  Self No Yes   Sig: Take 1 tablet (20 mg total) by mouth 2 (two) times a day   zolpidem (AMBIEN CR) 6.25 MG CR tablet  Self Yes Yes   Sig: Take 6.25 mg by mouth daily at bedtime as needed for sleep      Facility-Administered Medications: None     Patient's Medications   Discharge Prescriptions    No medications on file     No discharge procedures on file.  ED SEPSIS DOCUMENTATION              [1]   Social History  Tobacco Use    Smoking status: Former     Current packs/day: 0.00     Types: Cigarettes     Quit date: 1970     Years since quittin.4    Smokeless tobacco: Never    Tobacco comments:     Denied history of current ever day smoker, Former smoker and Never smoker all documented in [employer]   Vaping Use    Vaping status: Never Used   Substance Use Topics     Alcohol use: Not Currently     Comment: Denied history of alcohol use    Drug use: No     Comment: Denied history of drug use

## 2025-05-14 NOTE — H&P
H&P - Hospitalist   Name: Mattie Varela 80 y.o. female I MRN: [id number]  Unit/Bed#: ED-31 I Date of Admission: 5/14/2025   Date of Service: 5/14/2025 I Hospital Day: 0     Assessment & Plan  Fall  80-year-old female who presents from [facility] assisted living [facility] reportedly had a fall  Per patient's report, suspect likely mechanical fall with no preceding symptoms  Had laceration with wound treated by podiatry resident  Denies any loss of consciousness, head trauma  PT and OT to further evaluate as patient does report gait unsteadiness  At baseline uses rolling walker  Hypothyroidism  Continue levothyroxine  Chronic obstructive pulmonary disease, unspecified COPD type ([facility])  Albuterol as needed, not in any distress  Abnormal urinalysis  Previous urine cultures growing ESBL Klebsiella, report anaphylaxis to penicillins  Some concerns for possible infection, will give 1 dose of ertapenem and monitor at this time  Urinary retention  Chronic Bateman catheter  Chronic diastolic congestive heart failure (HCC)  Wt Readings from Last 3 Encounters:   04/23/25 78.6 kg (173 lb 4.5 oz)   04/11/25 79.8 kg (176 lb)   03/04/25 76.2 kg (168 lb)     Patient currently appears euvolemic, chest x-ray without any evidence of acute heart failure  Currently on room air  Hold torsemide as UA did show hyaline casts indicating mild dehydration  Can likely resume torsemide tomorrow, previous dose of 40 mg twice daily  Gastro-esophageal reflux disease without esophagitis  Continue Pepcid  SIRS (systemic inflammatory response syndrome) ([facility])  Met SIRS criteria on admission, with tachycardia, elevated white count  Episode of hypotension that resolved with IV fluids  Suspect white count is likely reactive in setting of fall and laceration  UA concerning for possible infection though she has chronic Bateman catheter  Will give 1 dose of ertapenem, urine cultures pending      VTE Pharmacologic Prophylaxis:   Moderate Risk (Score 3-4) -  Pharmacological DVT Prophylaxis Ordered: heparin.  Code Status: Level 1 - Full Code     Anticipated Length of Stay: Patient will be admitted on an observation basis with an anticipated length of stay of less than 2 midnights secondary to possible UTI, fall, PT/OT evaluatoin.    History of Present Illness   Chief Complaint: Fall    Mattiesa CARLEY Varela is a 80 y.o. female with a PMH of obesity, hypothyroidism, COPD, heart failure and GERD who presents with fall.  Patient is a resident of [facility].  Reportedly was ambulating with her rolling walker, her walker reportedly was falling apart and she fell on the basket portion of it.  It resulted in a laceration of her left lower extremity.  Denies any preceding symptoms such as dizziness, lightheadedness, palpitations or shortness of breath.  Has been having intermittent diarrhea ongoing for several weeks.  Denies any fevers, chills, nausea or vomiting.    Review of Systems   Gastrointestinal:  Positive for diarrhea.   Musculoskeletal:  Positive for gait problem.   All other systems reviewed and are negative.      Historical Information   Past Medical History:   Diagnosis Date    Acid reflux     Acute kidney injury (HCC) 06/18/2022    Anemia     hx of iron-deficient    Anxiety     Arthritis     Asthma     last needed inhaler last year 2020    Basal cell carcinoma     upper lip    Chronic narcotic dependence (HCC)     Chronic pain     Colitis 11/19/2024    Colon polyp     Cystocele     Diverticulosis     Dizziness     at times    Dysfunctional uterine bleeding     last assessed - [id number]    Dysphagia     Fibromyalgia     Gastric ulcer     Gastroparesis     History of colonic polyps     last assessed - [id number]    History of gastroesophageal reflux (GERD)     Hypercholesterolemia     Hyperlipidemia     Hypertension     Hyponatremia 01/13/2024    IBS (irritable bowel syndrome)     Pneumobilia 06/18/2022    Post laminectomy syndrome     S/P insertion of spinal  cord stimulator 07/18/2018    s/p [employer] loop recorder 3/2/2024 03/02/2024    Seasonal allergies     Shortness of breath     exertional    Spinal stenosis     Status post lumbar spinal fusion 03/16/2018    Stroke (HCC)     pt states slight stroke March 2022     Past Surgical History:   Procedure Laterality Date    APPENDECTOMY      BACK SURGERY      BREAST CYST EXCISION Left     CARDIAC CATHETERIZATION  11/14/2023    Procedure: Cardiac catheterization;  Surgeon: Randolph Holt MD;  [facility];  Service: Cardiology    CARDIAC CATHETERIZATION N/A 11/14/2023    Procedure: Cardiac Coronary Angiogram;  Surgeon: Randolph Holt MD;  [facility];  Service: Cardiology    CARDIAC ELECTROPHYSIOLOGY PROCEDURE N/A 3/2/2024    Procedure: Cardiac loop recorder implant;  Surgeon: Edu Fontaine MD;  [facility]: [facility];  Service: Cardiology    CHOLECYSTECTOMY      COLONOSCOPY      ESOPHAGOGASTRODUODENOSCOPY N/A 09/28/2016    Procedure: ESOPHAGOGASTRODUODENOSCOPY (EGD);  Surgeon: Mylene Moeller MD;  Location: [facility];  Service:     HERNIA REPAIR      HYSTERECTOMY      TTAH-BSO age 28    LAMINECTOMY      LUMBAR LAMINECTOMY      OOPHORECTOMY      age 28    MO ARTHRODESIS POSTERIOR/PSTLAT TQ 1NTRSPC THORACIC N/A 06/04/2018    Procedure: Reopening of lumbar incision for T12-L5 posterior instrumented fixation and fusion and T12-L4 posterior decompression;  Surgeon: Wood Rogel MD;  Location: [facility];  Service: Neurosurgery    MO COLONOSCOPY FLX DX W/COLLJ SPEC WHEN PFRMD N/A 03/02/2016    Procedure: EGD AND COLONOSCOPY;  Surgeon: Mylene Moeller MD;  [facility]: [facility];  Service: Gastroenterology    MO DILATION ESOPH UNGUIDED SOUND/BOUGIE 1/MULT PASS N/A 09/28/2016    Procedure: DILATATION ESOPHAGEAL;  Surgeon: Mylene Moeller MD;  [facility];  Service: Gastroenterology    MO ESOPHAGOGASTRODUODENOSCOPY TRANSORAL DIAGNOSTIC N/A 07/18/2016    Procedure:  ESOPHAGOGASTRODUODENOSCOPY (EGD);  Surgeon: Mylene Moeller MD;  [facility];  Service: Gastroenterology    DE INSJ/RPLCMT SPINAL NPG/RCVR POCKET CRTJ&CONNJ Left 2018    Procedure: removal of left buttock implantable pulse generator and placement of new  implantable pulse generator;  Surgeon: Wood Rogel MD;  [facility]: [facility];  Service: Neurosurgery     Social History     Tobacco Use    Smoking status: Former     Current packs/day: 0.00     Types: Cigarettes     Quit date: 1970     Years since quittin.4    Smokeless tobacco: Never    Tobacco comments:     Denied history of current ever day smoker, Former smoker and Never smoker all documented in [employer]   Vaping Use    Vaping status: Never Used   Substance and Sexual Activity    Alcohol use: Not Currently     Comment: Denied history of alcohol use    Drug use: No     Comment: Denied history of drug use    Sexual activity: Not Currently     E-Cigarette/Vaping    E-Cigarette Use Never User      E-Cigarette/Vaping Substances    Nicotine No     THC No     CBD No     Flavoring No     Other No     Unknown No      Family history non-contributory  Social History:  Marital Status:    Occupation: retired  Patient Pre-hospital Living Situation: Assisted Living  Patient Pre-hospital Level of Mobility: walks with walker  Patient Pre-hospital Diet Restrictions: none    Meds/Allergies   I have reviewed home medications with patient personally.  Prior to Admission medications    Medication Sig Start Date End Date Taking? Authorizing Provider   acetaminophen (TYLENOL) 325 mg tablet Take 3 tablets (975 mg total) by mouth every 8 (eight) hours 10/19/24  Yes Paco Carmona MD   albuterol (PROVENTIL HFA,VENTOLIN HFA) 90 mcg/act inhaler Inhale 2 puffs every 6 (six) hours as needed for wheezing or shortness of breath 22  Yes NANY Pollock   aspirin 81 mg chewable tablet Chew 81 mg in the morning.   Yes Historical Provider, MD   atorvastatin  (LIPITOR) 40 mg tablet TAKE ONE TABLET BY MOUTH ONCE DAILY 11/22/22  Yes NANY Pollock   Blood Glucose Monitoring Suppl (OneTouch Verio Reflect) w/Device KIT Check blood sugars twice daily. Please substitute with appropriate alternative as covered by patient's insurance. Dx: E11.65 2/20/23  Yes NANY Pollock   Cholecalciferol (True Vitamin D3) 1.25 MG (13790 UT) capsule Take 50,000 Units by mouth in the morning.   Yes Historical Provider, MD   docusate sodium (COLACE) 100 mg capsule Take 1 capsule (100 mg total) by mouth 2 (two) times a day 6/5/24  Yes NANY Ruby   ergocalciferol (VITAMIN D2) 50,000 units Take 50,000 Units by mouth Take I cap orally once monthly   Yes Historical Provider, MD   famotidine (PEPCID) 20 mg tablet Take 20 mg by mouth in the morning and 20 mg in the evening. 1 tab orally twice daily as needed for heartburn/acid reflux.   Yes Historical Provider, MD   fentaNYL (DURAGESIC) 75 mcg/hr Place 1 patch on the skin every third day Apply 1 patch every 3 days   Yes Historical Provider, MD   ferrous sulfate 325 (65 Fe) mg tablet Take 1 tablet (325 mg total) by mouth daily with breakfast 1/10/24  Yes Paco Boyer MD   gabapentin (NEURONTIN) 300 mg capsule Take 1 capsule (300 mg total) by mouth daily at bedtime 2/13/25  Yes Kylie Sommers PA-C   glucose blood (OneTouch Verio) test strip Check blood sugars twice daily. Please substitute with appropriate alternative as covered by patient's insurance. Dx: E11.65 2/20/23  Yes NANY Pollock   latanoprost (XALATAN) 0.005 % ophthalmic solution Administer 1 drop to both eyes daily at bedtime   Yes Historical Provider, MD   lidocaine (LIDODERM) 5 % Apply 1 patch topically over 12 hours daily Remove & Discard patch within 12 hours or as directed by MD 10/20/24  Yes Paco Carmona MD   Lidocaine 4 % PTCH Apply 4 % topically Apply 1 every morning to back and remove at bedtime   Yes Historical Provider, MD   loperamide (IMODIUM  A-D) 2 MG tablet Take 2 mg by mouth as needed in the morning and 2 mg as needed at noon and 2 mg as needed in the evening and 2 mg as needed before bedtime for diarrhea.   Yes Historical Provider, MD   MAGNESIUM OXIDE 400 PO Take 400 mg by mouth in the morning and 400 mg before bedtime.   Yes Historical Provider, MD   methocarbamol (ROBAXIN) 750 mg tablet Take 750 mg by mouth every 8 (eight) hours 1 tab orally every 8 hours as needed for muscle spasms   Yes Historical Provider, MD   metoprolol succinate (TOPROL-XL) 25 mg 24 hr tablet Take 0.5 tablets (12.5 mg total) by mouth daily 12/3/24  Yes Alexys Angel DO   Milnacipran HCl (Savella) 100 MG TABS Take 1 tablet (100 mg total) by mouth daily 3/5/23  Yes Venice Baires DO   morphine (MSIR) 15 mg tablet Take 1 tablet (15 mg total) by mouth every 6 (six) hours Max Daily Amount: 60 mg 2/13/25  Yes Kylie Sommers PA-C   nystatin (MYCOSTATIN) powder Apply topically 2 (two) times a day 4/25/25  Yes Khurram Ceron MD   ondansetron (ZOFRAN) 4 mg tablet Take 1 tablet (4 mg total) by mouth every 8 (eight) hours as needed for nausea or vomiting 1/23/25  Yes Naveen Ramon PA-C   OneTouch Delica Lancets 33G MISC Check blood sugars twice daily. Please substitute with appropriate alternative as covered by patient's insurance. Dx: E11.65 2/20/23  Yes NANY Pollock   polyethylene glycol (MIRALAX) 17 g packet Take 17 g by mouth daily 6/5/24  Yes NANY Ruby   potassium chloride (Klor-Con M20) 20 mEq tablet Take 1 tablet (20 mEq total) by mouth daily 4/11/25  Yes Kylie Sommers PA-C   senna (SENOKOT) 8.6 mg Take 2 tablets (17.2 mg total) by mouth every morning 2/13/25  Yes Kylie Sommers PA-C   torsemide (DEMADEX) 20 mg tablet Take 20 mg by mouth if needed (leg swelling)   Yes Historical Provider, MD   trospium chloride (SANCTURA) 20 mg tablet Take 1 tablet (20 mg total) by mouth 2 (two) times a day 2/12/25  Yes Benjamin Ramirez MD   zolpidem (AMBIEN  CR) 6.25 MG CR tablet Take 6.25 mg by mouth daily at bedtime as needed for sleep   Yes Historical Provider, MD   levothyroxine 75 mcg tablet Take 0.5 tablets (37.5 mcg total) by mouth daily in the early morning 1/17/24 4/22/25  Alejandro Philip MD   simethicone (MYLICON) 80 mg chewable tablet Chew 80 mg every 6 (six) hours as needed for flatulence Chew and swallow 1 tab orally 3 times daily as needed for gas    Historical Provider, MD   torsemide (DEMADEX) 10 mg tablet Increase to 60 mg BID for 5 days, then decrease to 40 mg BID  Patient not taking: Reported on 5/14/2025 4/11/25   Kylie Sommers PA-C     Allergies   Allergen Reactions    Penicillins Anaphylaxis, Hives and Other (See Comments)     Other reaction(s): Unknown Reaction    Sulfa Antibiotics Anaphylaxis and Other (See Comments)     Other reaction(s): Unknown Reaction    Aspartame - Food Allergy Other (See Comments) and Hypertension     Slurred speech, weakness, stroke sx    Iodinated Contrast Media Hives     Pt has taken prep prior for contrast and has not had any break through reaction    Keflex [Cephalexin] Hives       Objective :  Temp:  [98.3 °F (36.8 °C)] 98.3 °F (36.8 °C)  HR:  [] 86  BP: ()/(47-65) 133/65  Resp:  [12-17] 15  SpO2:  [94 %-96 %] 95 %  O2 Device: None (Room air)    Physical Exam  Vitals and nursing note reviewed.   Constitutional:       Appearance: Normal appearance. She is obese.   HENT:      Head: Normocephalic.     Eyes:      Conjunctiva/sclera: Conjunctivae normal.       Cardiovascular:      Rate and Rhythm: Normal rate.   Pulmonary:      Effort: Pulmonary effort is normal.      Breath sounds: No wheezing.   Abdominal:      General: Bowel sounds are normal. There is no distension.      Palpations: Abdomen is soft.     Musculoskeletal:         General: No swelling.      Right lower leg: No edema.      Left lower leg: No edema.     Skin:     General: Skin is warm.      Comments: Left lower extremity in dressing, laceration  noted.  Right lower extremity with chronic venous changes     Neurological:      General: No focal deficit present.      Mental Status: She is alert. Mental status is at baseline.          Lines/Drains:    Urinary Catheter:  Goal for removal: N/A - Chronic Bateman               Lab Results: I have reviewed the following results:  Results from last 7 days   Lab Units 05/14/25  0854   WBC Thousand/uL 14.02*   HEMOGLOBIN g/dL 9.2*   HEMATOCRIT % 28.5*   PLATELETS Thousands/uL 229   SEGS PCT % 82*   LYMPHO PCT % 10*   MONO PCT % 6   EOS PCT % 1     Results from last 7 days   Lab Units 05/14/25  0854   SODIUM mmol/L 135   POTASSIUM mmol/L 3.2*   CHLORIDE mmol/L 92*   CO2 mmol/L 36*   BUN mg/dL 19   CREATININE mg/dL 1.56*   ANION GAP mmol/L 7   CALCIUM mg/dL 8.8   ALBUMIN g/dL 3.2*   TOTAL BILIRUBIN mg/dL 0.46   ALK PHOS U/L 116*   ALT U/L 8   AST U/L 14   GLUCOSE RANDOM mg/dL 167*     Results from last 7 days   Lab Units 05/14/25  0854   INR  1.07         Lab Results   Component Value Date    HGBA1C 7.4 (H) 04/23/2025    HGBA1C 7.4 (H) 10/12/2024    HGBA1C 6.5 (H) 11/26/2023     Results from last 7 days   Lab Units 05/14/25  1051 05/14/25  0854   LACTIC ACID mmol/L 1.8 2.2*   PROCALCITONIN ng/ml  --  <0.05       Imaging Results Review: I personally reviewed the following image studies/reports in PACS and discussed pertinent findings with Radiology: chest xray. My interpretation of the radiology images/reports is: No evidence of acute pulmonary cardio process..  Other Study Results Review: EKG was reviewed.       ** Please Note: This note has been constructed using a voice recognition system. **

## 2025-05-14 NOTE — ED PROVIDER NOTES
Time reflects when diagnosis was documented in both MDM as applicable and the Disposition within this note       Time User Action Codes Description Comment    5/14/2025 11:39 AM Julian West Add [S81.819A] Leg laceration     5/14/2025 11:40 AM Julian West Add [I95.9] Hypotension     5/14/2025 11:40 AM Julian West Add [R42] Lightheadedness           ED Disposition       ED Disposition   Admit    Condition   Stable    Date/Time   Wed May 14, 2025 11:40 AM    Comment   Case was discussed with GREG and the patient's admission status was agreed to be Admission Status: observation status to the service of Dr. Broderick.               Assessment & Plan       Medical Decision Making  Patient presents with left posterior lower leg laceration, no active bleeding, secondary to bumping it on a metal basket earlier this morning.  Patient also with mild dizziness, hypotension.  Laceration repaired, see procedure note. Will proceed with labs, chest XR, ECG, fluids, urinalysis, urine culture. WBC 14, lactic acid 2.2 initially, at 2 hours 1.8. Patient to be admitted under internal medicine service for continued observation.    Amount and/or Complexity of Data Reviewed  Labs: ordered.  Radiology: ordered.    Risk  OTC drugs.  Prescription drug management.  Decision regarding hospitalization.             Medications   morphine (MSIR) IR tablet 15 mg (has no administration in time range)   lidocaine (PF) (XYLOCAINE-MPF) 1 % injection 10 mL (10 mL Infiltration Given by Other 5/14/25 7248)   multi-electrolyte (Plasmalyte-A/Isolyte-S PH 7.4/Normosol-R) IV bolus 500 mL (0 mL Intravenous Stopped 5/14/25 1004)   acetaminophen (TYLENOL) tablet 975 mg (975 mg Oral Given 5/14/25 0928)       ED Risk Strat Scores                    No data recorded        SBIRT 22yo+      Flowsheet Row Most Recent Value   Initial Alcohol Screen: US AUDIT-C     1. How often do you have a drink containing alcohol? 0 Filed at: 05/14/2025 2587   2. How many  drinks containing alcohol do you have on a typical day you are drinking?  0 Filed at: 05/14/2025 0736   3b. FEMALE Any Age, or MALE 65+: How often do you have 4 or more drinks on one occassion? 0 Filed at: 05/14/2025 0736   Audit-C Score 0 Filed at: 05/14/2025 0736   DUNCAN: How many times in the past year have you...    Used an illegal drug or used a prescription medication for non-medical reasons? Never Filed at: 05/14/2025 0736                            History of Present Illness       Chief Complaint   Patient presents with    Laceration     Pt was walking with walker this am when she fell back, slicing right lower extremity around the ankle and approx mid calf, thinks she cut it on a metal basket that was nearby. Significant bleeding that is now controlled, deep lacerations noted. Takes daily aspirin        Past Medical History:   Diagnosis Date    Acid reflux     Acute kidney injury (HCC) 06/18/2022    Anemia     hx of iron-deficient    Anxiety     Arthritis     Asthma     last needed inhaler last year 2020    Basal cell carcinoma     upper lip    Chronic narcotic dependence (HCC)     Chronic pain     Colitis 11/19/2024    Colon polyp     Cystocele     Diverticulosis     Dizziness     at times    Dysfunctional uterine bleeding     last assessed - [id number]    Dysphagia     Fibromyalgia     Gastric ulcer     Gastroparesis     History of colonic polyps     last assessed - [id number]    History of gastroesophageal reflux (GERD)     Hypercholesterolemia     Hyperlipidemia     Hypertension     Hyponatremia 01/13/2024    IBS (irritable bowel syndrome)     Pneumobilia 06/18/2022    Post laminectomy syndrome     S/P insertion of spinal cord stimulator 07/18/2018    s/p [employer] loop recorder 3/2/2024 03/02/2024    Seasonal allergies     Shortness of breath     exertional    Spinal stenosis     Status post lumbar spinal fusion 03/16/2018    Stroke (HCC)     pt states slight stroke March 2022      Past Surgical History:    Procedure Laterality Date    APPENDECTOMY      BACK SURGERY      BREAST CYST EXCISION Left     CARDIAC CATHETERIZATION  11/14/2023    Procedure: Cardiac catheterization;  Surgeon: Randolph Holt MD;  [facility];  Service: Cardiology    CARDIAC CATHETERIZATION N/A 11/14/2023    Procedure: Cardiac Coronary Angiogram;  Surgeon: Randolph Holt MD;  [facility];  Service: Cardiology    CARDIAC ELECTROPHYSIOLOGY PROCEDURE N/A 3/2/2024    Procedure: Cardiac loop recorder implant;  Surgeon: Edu Fontaine MD;  [facility];  Service: Cardiology    CHOLECYSTECTOMY      COLONOSCOPY      ESOPHAGOGASTRODUODENOSCOPY N/A 09/28/2016    Procedure: ESOPHAGOGASTRODUODENOSCOPY (EGD);  Surgeon: Mylene Moeller MD;  [facility]: [facility];  Service:     HERNIA REPAIR      HYSTERECTOMY      TTAH-BSO age 28    LAMINECTOMY      LUMBAR LAMINECTOMY      OOPHORECTOMY      age 28    KS ARTHRODESIS POSTERIOR/PSTLAT TQ 1NTRSPC THORACIC N/A 06/04/2018    Procedure: Reopening of lumbar incision for T12-L5 posterior instrumented fixation and fusion and T12-L4 posterior decompression;  Surgeon: Wood Rogel MD;  [facility]: [facility];  Service: Neurosurgery    KS COLONOSCOPY FLX DX W/COLLJ SPEC WHEN PFRMD N/A 03/02/2016    Procedure: EGD AND COLONOSCOPY;  Surgeon: Mylene Moeller MD;  [facility]: [facility];  Service: Gastroenterology    KS DILATION ESOPH UNGUIDED SOUND/BOUGIE 1/MULT PASS N/A 09/28/2016    Procedure: DILATATION ESOPHAGEAL;  Surgeon: Mylene Moeller MD;  [facility];  Service: Gastroenterology    KS ESOPHAGOGASTRODUODENOSCOPY TRANSORAL DIAGNOSTIC N/A 07/18/2016    Procedure: ESOPHAGOGASTRODUODENOSCOPY (EGD);  Surgeon: Mylene Moeller MD;  [facility];  Service: Gastroenterology    KS INSJ/RPLCMT SPINAL NPG/RCVR POCKET CRTJ&CONNJ Left 06/04/2018    Procedure: removal of left buttock implantable pulse generator and placement of new  implantable pulse generator;  Surgeon:  "Wood Rogel MD;  [facility];  Service: Neurosurgery      Family History   Problem Relation Age of Onset    Lung cancer Mother 46    Pulmonary embolism Father     No Known Problems Sister     No Known Problems Daughter     No Known Problems Daughter     Stroke Maternal Grandmother     Heart attack Maternal Grandfather     No Known Problems Paternal Grandmother     No Known Problems Paternal Grandfather     No Known Problems Maternal Aunt     Diabetes Family         Diabetes mellitus    Hypertension Family     Stroke Family         Stroke complications      Social History[1]   E-Cigarette/Vaping    E-Cigarette Use Never User       E-Cigarette/Vaping Substances    Nicotine No     THC No     CBD No     Flavoring No     Other No     Unknown No       I have reviewed and agree with the history as documented.     Patient presents from care facility after she stumbled backwards while using her walker and believes she cut the back of her left leg on a metal basket.  Denies head strike, denies loss of consciousness.  On exam she is awake, alert, oriented.  She takes aspirin 81 Mg daily.  She was brought to the ED via ambulance for further evaluation.  Upon initial evaluation bleeding to left posterior leg is well-controlled with no active bleeding.  Patient reports feeling a little dizzy after \"I saw all the blood.\"  Denies any nausea, vomiting, fever, chills, chest pain, shortness of breath.  She does not recall date of her last tetanus booster.  States the back of her left leg is painful at the site of the laceration.  She denies any other injuries or concerns at this time.  Has chronic indwelling Bateman.      Laceration      Review of Systems   Skin:         Left leg laceration   Neurological:  Positive for dizziness.   All other systems reviewed and are negative.          Objective       ED Triage Vitals   Temperature Pulse Blood Pressure Respirations SpO2 Patient Position - Orthostatic VS   05/14/25 0742 " 05/14/25 0736 05/14/25 0736 05/14/25 0736 05/14/25 0736 05/14/25 0736   98.3 °F (36.8 °C) 100 97/50 17 96 % Sitting      Temp Source Heart Rate Source BP Location FiO2 (%) Pain Score    05/14/25 0742 05/14/25 0736 05/14/25 0736 -- 05/14/25 0928    Oral Monitor Right arm  9      Vitals      Date and Time Temp Pulse SpO2 Resp BP Pain Score FACES Pain Rating User   05/14/25 1145 -- 94 94 % 15 118/58 -- -- VIV   05/14/25 1045 -- 88 95 % 15 105/58 -- -- VIV   05/14/25 1015 -- 86 95 % 12 112/55 -- -- VIV   05/14/25 0958 -- -- -- -- -- 4 -- VIV   05/14/25 0928 -- -- -- -- -- 9 -- VIV   05/14/25 0915 -- 88 -- 16 113/57 -- -- JK   05/14/25 0911 -- 89 95 % 16 113/57 -- -- VIV   05/14/25 0845 -- 87 96 % 16 92/47 swank at bedside -- -- VIV   05/14/25 0742 98.3 °F (36.8 °C) -- -- -- -- -- -- VIV   05/14/25 0736 -- 100 96 % 17 97/50 -- -- VIV            Physical Exam  HENT:      Head: Atraumatic.     Skin:     Capillary Refill: Capillary refill takes less than 2 seconds.      Comments: L-shaped laceration to the posterior medial left calf to level of subcutaneous tissue.  Approximately 8 cm in length.  No exposed tendon or neurovascular structures.  No acute clinical signs of infection: No drainage, purulence, redness, warmth, swelling.  Mild ecchymosis noted about laceration.  No active bleeding.     Neurological:      Mental Status: She is alert and oriented to person, place, and time.      Comments: Gross sensation and light touch to left lower extremity intact at level of laceration as well as distally   Psychiatric:         Mood and Affect: Mood normal.         Behavior: Behavior normal.     Bilateral lower extremity edema with dermal disruptions, mild venous stasis changes to the anterior shins with no acute clinical signs of infection    Left lower extremity: manual muscle testing 5/5 for all compartments of the lower extremity. Gross motor intact to digits x 5    Left DP palpable, biphasic on doppler, Left PT nonpalpable, biphasic  on Doppler    Results Reviewed       Procedure Component Value Units Date/Time    HS Troponin I 2hr [[id number]]  (Normal) Collected: 05/14/25 1051    Lab Status: Final result Specimen: Blood from Arm, Left Updated: 05/14/25 1118     hs TnI 2hr 13 ng/L      Delta 2hr hsTnI 2 ng/L     Lactic acid 2 Hours [[id number]]  (Normal) Collected: 05/14/25 1051    Lab Status: Final result Specimen: Blood from Arm, Left Updated: 05/14/25 1115     LACTIC ACID 1.8 mmol/L     Narrative:      Result may be elevated if tourniquet was used during collection.    Urine Microscopic [[id number]]  (Abnormal) Collected: 05/14/25 0945    Lab Status: Final result Specimen: Urine, Indwelling Bateman Catheter Updated: 05/14/25 1055     RBC, UA 20-30 /hpf      WBC, UA Innumerable /hpf      Epithelial Cells Occasional /hpf      Bacteria, UA Innumerable /hpf      MUCUS THREADS Occasional     Hyaline Casts, UA 25-50 /lpf      Amorphous Crystals, UA Occasional     WBC Clumps Present    Urine culture [[id number]] Collected: 05/14/25 0945    Lab Status: In process Specimen: Urine, Indwelling Bateman Catheter Updated: 05/14/25 1055    HS Troponin I 4hr [[id number]]     Lab Status: No result Specimen: Blood     Procalcitonin [[id number]]  (Normal) Collected: 05/14/25 0854    Lab Status: Final result Specimen: Blood from Arm, Left Updated: 05/14/25 1000     Procalcitonin <0.05 ng/ml     Urine Macroscopic, POC [[id number]]  (Abnormal) Collected: 05/14/25 0945    Lab Status: Final result Specimen: Urine Updated: 05/14/25 0947     Color, UA Yellow     Clarity, UA Cloudy     pH, UA 6.5     Leukocytes, UA Small     Nitrite, UA Negative     Protein, UA 30 (1+) mg/dl      Glucose, UA Negative mg/dl      Ketones, UA Negative mg/dl      Urobilinogen, UA 0.2 E.U./dl      Bilirubin, UA Negative     Occult Blood, UA Moderate     Specific Gravity, UA 1.020    Narrative:      CLINITEK RESULT    Comprehensive metabolic panel [[id number]]  (Abnormal) Collected: 05/14/25 0854     Lab Status: Final result Specimen: Blood from Arm, Left Updated: 05/14/25 0930     Sodium 135 mmol/L      Potassium 3.2 mmol/L      Chloride 92 mmol/L      CO2 36 mmol/L      ANION GAP 7 mmol/L      BUN 19 mg/dL      Creatinine 1.56 mg/dL      Glucose 167 mg/dL      Calcium 8.8 mg/dL      Corrected Calcium 9.4 mg/dL      AST 14 U/L      ALT 8 U/L      Alkaline Phosphatase 116 U/L      Total Protein 6.1 g/dL      Albumin 3.2 g/dL      Total Bilirubin 0.46 mg/dL      eGFR 30 ml/min/1.73sq m     Narrative:      [employer] guidelines for Chronic Kidney Disease (CKD):     Stage 1 with normal or high GFR (GFR > 90 mL/min/1.73 square meters)    Stage 2 Mild CKD (GFR = 60-89 mL/min/1.73 square meters)    Stage 3A Moderate CKD (GFR = 45-59 mL/min/1.73 square meters)    Stage 3B Moderate CKD (GFR = 30-44 mL/min/1.73 square meters)    Stage 4 Severe CKD (GFR = 15-29 mL/min/1.73 square meters)    Stage 5 End Stage CKD (GFR <15 mL/min/1.73 square meters)  Note: GFR calculation is accurate only with a steady state creatinine    HS Troponin 0hr (reflex protocol) [[id number]]  (Normal) Collected: 05/14/25 0854    Lab Status: Final result Specimen: Blood from Arm, Left Updated: 05/14/25 0927     hs TnI 0hr 11 ng/L     Lactic acid, plasma (w/reflex if result > 2.0) [[id number]]  (Abnormal) Collected: 05/14/25 0854    Lab Status: Final result Specimen: Blood from Arm, Left Updated: 05/14/25 0924     LACTIC ACID 2.2 mmol/L     Narrative:      Result may be elevated if tourniquet was used during collection.    Protime-INR [[id number]]  (Normal) Collected: 05/14/25 0854    Lab Status: Final result Specimen: Blood from Arm, Left Updated: 05/14/25 0918     Protime 14.1 seconds      INR 1.07    Narrative:      INR Therapeutic Range    Indication                                             INR Range      Atrial Fibrillation                                               2.0-3.0  Hypercoagulable State                        "             2.0.2.3  Left Ventricular Asist Device                            2.0-3.0  Mechanical Heart Valve                                  -    Aortic(with afib, MI, embolism, HF, LA enlargement,    and/or coagulopathy)                                     2.0-3.0 (2.5-3.5)     Mitral                                                             2.5-3.5  Prosthetic/Bioprosthetic Heart Valve               2.0-3.0  Venous thromboembolism (VTE: VT, PE        2.0-3.0    APTT [[id number]]  (Normal) Collected: 05/14/25 0854    Lab Status: Final result Specimen: Blood from Arm, Left Updated: 05/14/25 0918     PTT 28 seconds     CBC and differential [[id number]]  (Abnormal) Collected: 05/14/25 0854    Lab Status: Final result Specimen: Blood from Arm, Left Updated: 05/14/25 0902     WBC 14.02 Thousand/uL      RBC 3.26 Million/uL      Hemoglobin 9.2 g/dL      Hematocrit 28.5 %      MCV 87 fL      MCH 28.2 pg      MCHC 32.3 g/dL      RDW 14.5 %      MPV 10.3 fL      Platelets 229 Thousands/uL      nRBC 0 /100 WBCs      Segmented % 82 %      Immature Grans % 1 %      Lymphocytes % 10 %      Monocytes % 6 %      Eosinophils Relative 1 %      Basophils Relative 0 %      Absolute Neutrophils 11.45 Thousands/µL      Absolute Immature Grans 0.08 Thousand/uL      Absolute Lymphocytes 1.37 Thousands/µL      Absolute Monocytes 0.90 Thousand/µL      Eosinophils Absolute 0.17 Thousand/µL      Basophils Absolute 0.05 Thousands/µL             XR chest 2 views   Final Interpretation by Edwige Marcial MD (05/14 0930)      Low lung volumes with bibasilar atelectasis on the lateral projection.            Workstation performed: [id number]               Universal Protocol:  Consent: Verbal consent obtained  Risks and benefits: risks, benefits and alternatives were discussed  Consent given by: patient  Time out: Immediately prior to procedure a \"time out\" was called to verify the correct patient, procedure, equipment, support staff and " site/side marked as required.  Patient understanding: patient states understanding of the procedure being performed  Patient identity confirmed: verbally with patient and provided demographic data  Laceration repair    Date/Time: 5/14/2025 8:57 AM    Performed by: Mayra aPl DPM  Authorized by: Mayra Pal DPM  Body area: lower extremity  Location details: left lower leg  Foreign bodies: no foreign bodies  Tendon involvement: none  Nerve involvement: none  Anesthesia: local infiltration    Anesthesia:  Local Anesthetic: lidocaine 1% without epinephrine  Anesthetic total: 8 mL      Procedure Details:  Preparation: Patient was prepped and draped in the usual sterile fashion.  Irrigation solution: saline  Irrigation method: syringe  Amount of cleaning: extensive  Skin closure: 3-0 nylon and 4-0 nylon  Subcutaneous closure: 3-0 Vicryl  Technique: simple  Dressing: 4x4 sterile gauze and gauze roll  Patient tolerance: patient tolerated the procedure well with no immediate complications          ED Medication and Procedure Management   Prior to Admission Medications   Prescriptions Last Dose Informant Patient Reported? Taking?   Blood Glucose Monitoring Suppl (OneTouch Verio Reflect) w/Device KIT  Self No Yes   Sig: Check blood sugars twice daily. Please substitute with appropriate alternative as covered by patient's insurance. Dx: E11.65   Cholecalciferol (True Vitamin D3) 1.25 MG (57650 UT) capsule   Yes Yes   Sig: Take 50,000 Units by mouth in the morning.   Lidocaine 4 % PTCH  Self Yes Yes   Sig: Apply 4 % topically Apply 1 every morning to back and remove at bedtime   MAGNESIUM OXIDE 400 PO  Self Yes Yes   Sig: Take 400 mg by mouth in the morning and 400 mg before bedtime.   Milnacipran HCl (Savella) 100 MG TABS  Self No Yes   Sig: Take 1 tablet (100 mg total) by mouth daily   OneTouch Delica Lancets 33G MISC  Self No Yes   Sig: Check blood sugars twice daily. Please substitute with appropriate alternative as  covered by patient's insurance. Dx: E11.65   acetaminophen (TYLENOL) 325 mg tablet  Self No Yes   Sig: Take 3 tablets (975 mg total) by mouth every 8 (eight) hours   albuterol (PROVENTIL HFA,VENTOLIN HFA) 90 mcg/act inhaler  Self No Yes   Sig: Inhale 2 puffs every 6 (six) hours as needed for wheezing or shortness of breath   aspirin 81 mg chewable tablet  Self Yes Yes   Sig: Chew 81 mg in the morning.   atorvastatin (LIPITOR) 40 mg tablet  Self No Yes   Sig: TAKE ONE TABLET BY MOUTH ONCE DAILY   docusate sodium (COLACE) 100 mg capsule  Self No Yes   Sig: Take 1 capsule (100 mg total) by mouth 2 (two) times a day   ergocalciferol (VITAMIN D2) 50,000 units  Self Yes Yes   Sig: Take 50,000 Units by mouth Take I cap orally once monthly   famotidine (PEPCID) 20 mg tablet  Self Yes Yes   Sig: Take 20 mg by mouth in the morning and 20 mg in the evening. 1 tab orally twice daily as needed for heartburn/acid reflux.   fentaNYL (DURAGESIC) 75 mcg/hr  Self Yes Yes   Sig: Place 1 patch on the skin every third day Apply 1 patch every 3 days   ferrous sulfate 325 (65 Fe) mg tablet  Self No Yes   Sig: Take 1 tablet (325 mg total) by mouth daily with breakfast   gabapentin (NEURONTIN) 300 mg capsule  Self No Yes   Sig: Take 1 capsule (300 mg total) by mouth daily at bedtime   glucose blood (OneTouch Verio) test strip  Self No Yes   Sig: Check blood sugars twice daily. Please substitute with appropriate alternative as covered by patient's insurance. Dx: E11.65   latanoprost (XALATAN) 0.005 % ophthalmic solution  Self Yes Yes   Sig: Administer 1 drop to both eyes daily at bedtime   levothyroxine 75 mcg tablet  Self No No   Sig: Take 0.5 tablets (37.5 mcg total) by mouth daily in the early morning   lidocaine (LIDODERM) 5 %  Self No Yes   Sig: Apply 1 patch topically over 12 hours daily Remove & Discard patch within 12 hours or as directed by MD   loperamide (IMODIUM A-D) 2 MG tablet  Self Yes Yes   Sig: Take 2 mg by mouth as needed  in the morning and 2 mg as needed at noon and 2 mg as needed in the evening and 2 mg as needed before bedtime for diarrhea.   methocarbamol (ROBAXIN) 750 mg tablet  Self Yes Yes   Sig: Take 750 mg by mouth every 8 (eight) hours 1 tab orally every 8 hours as needed for muscle spasms   metoprolol succinate (TOPROL-XL) 25 mg 24 hr tablet  Self No Yes   Sig: Take 0.5 tablets (12.5 mg total) by mouth daily   morphine (MSIR) 15 mg tablet  Self No Yes   Sig: Take 1 tablet (15 mg total) by mouth every 6 (six) hours Max Daily Amount: 60 mg   nystatin (MYCOSTATIN) powder   No Yes   Sig: Apply topically 2 (two) times a day   ondansetron (ZOFRAN) 4 mg tablet  Self No Yes   Sig: Take 1 tablet (4 mg total) by mouth every 8 (eight) hours as needed for nausea or vomiting   polyethylene glycol (MIRALAX) 17 g packet  Self No Yes   Sig: Take 17 g by mouth daily   potassium chloride (Klor-Con M20) 20 mEq tablet   No Yes   Sig: Take 1 tablet (20 mEq total) by mouth daily   senna (SENOKOT) 8.6 mg  Self No Yes   Sig: Take 2 tablets (17.2 mg total) by mouth every morning   simethicone (MYLICON) 80 mg chewable tablet  Self Yes No   Sig: Chew 80 mg every 6 (six) hours as needed for flatulence Chew and swallow 1 tab orally 3 times daily as needed for gas   torsemide (DEMADEX) 10 mg tablet Not Taking  No No   Sig: Increase to 60 mg BID for 5 days, then decrease to 40 mg BID   Patient not taking: Reported on 5/14/2025   torsemide (DEMADEX) 20 mg tablet  Self Yes Yes   Sig: Take 20 mg by mouth if needed (leg swelling)   trospium chloride (SANCTURA) 20 mg tablet  Self No Yes   Sig: Take 1 tablet (20 mg total) by mouth 2 (two) times a day   zolpidem (AMBIEN CR) 6.25 MG CR tablet  Self Yes Yes   Sig: Take 6.25 mg by mouth daily at bedtime as needed for sleep      Facility-Administered Medications: None     Patient's Medications   Discharge Prescriptions    No medications on file     No discharge procedures on file.  ED SEPSIS DOCUMENTATION   Time  reflects when diagnosis was documented in both MDM as applicable and the Disposition within this note       Time User Action Codes Description Comment    2025 11:39 AM Julian West [S81.819A] Leg laceration     2025 11:40 AM Julian West [I95.9] Hypotension     2025 11:40 AM Julian West [R42] Lightheadedness                      [1]   Social History  Tobacco Use    Smoking status: Former     Current packs/day: 0.00     Types: Cigarettes     Quit date: 1970     Years since quittin.4    Smokeless tobacco: Never    Tobacco comments:     Denied history of current ever day smoker, Former smoker and Never smoker all documented in [employer]   Vaping Use    Vaping status: Never Used   Substance Use Topics    Alcohol use: Not Currently     Comment: Denied history of alcohol use    Drug use: No     Comment: Denied history of drug use        Mayra Pal DPM  25 1154

## 2025-05-14 NOTE — PLAN OF CARE
Problem: PAIN - ADULT  Goal: Verbalizes/displays adequate comfort level or baseline comfort level  Description: Interventions:  - Encourage patient to monitor pain and request assistance  - Assess pain using appropriate pain scale  - Administer analgesics as ordered based on type and severity of pain and evaluate response  - Implement non-pharmacological measures as appropriate and evaluate response  - Consider cultural and social influences on pain and pain management  - Notify physician/advanced practitioner if interventions unsuccessful or patient reports new pain  - Educate patient/family on pain management process including their role and importance of  reporting pain   - Provide non-pharmacologic/complimentary pain relief interventions  Outcome: Progressing     Problem: INFECTION - ADULT  Goal: Absence or prevention of progression during hospitalization  Description: INTERVENTIONS:  - Assess and monitor for signs and symptoms of infection  - Monitor lab/diagnostic results  - Monitor all insertion sites, i.e. indwelling lines, tubes, and drains  - Monitor endotracheal if appropriate and nasal secretions for changes in amount and color  - [place] appropriate cooling/warming therapies per order  - Administer medications as ordered  - Instruct and encourage patient and family to use good hand hygiene technique  - Identify and instruct in appropriate isolation precautions for identified infection/condition  Outcome: Progressing  Goal: Absence of fever/infection during neutropenic period  Description: INTERVENTIONS:  - Monitor WBC  - Perform strict hand hygiene  - Limit to healthy visitors only  - No plants, dried, fresh or silk flowers with burns in patient room  Outcome: Progressing     Problem: SAFETY ADULT  Goal: Patient will remain free of falls  Description: INTERVENTIONS:  - Educate patient/family on patient safety including physical limitations  - Instruct patient to call for assistance with activity   -  Consider consulting OT/PT to assist with strengthening/mobility based on AM PAC & JH-HLM score  - Consult OT/PT to assist with strengthening/mobility   - Keep Call bell within reach  - Keep bed low and locked with side rails adjusted as appropriate  - Keep care items and personal belongings within reach  - Initiate and maintain comfort rounds  - Make Fall Risk Sign visible to staff  - Consider moving patient to room near nurses station  Outcome: Progressing  Goal: Maintain or return to baseline ADL function  Description: INTERVENTIONS:  -  Assess patient's ability to carry out ADLs; assess patient's baseline for ADL function and identify physical deficits which impact ability to perform ADLs (bathing, care of mouth/teeth, toileting, grooming, dressing, etc.)  - Assess/evaluate cause of self-care deficits   - Assess range of motion  - Assess patient's mobility; develop plan if impaired  - Assess patient's need for assistive devices and provide as appropriate  - Encourage maximum independence but intervene and supervise when necessary  - Involve family in performance of ADLs  - Assess for home care needs following discharge   - Consider OT consult to assist with ADL evaluation and planning for discharge  - Provide patient education as appropriate  - Monitor functional capacity and physical performance, use of AM PAC & JH-HLM   - Monitor gait, balance and fatigue with ambulation    Outcome: Progressing  Goal: Maintains/Returns to pre admission functional level  Description: INTERVENTIONS:  - Perform AM-PAC 6 Click Basic Mobility/ Daily Activity assessment daily.  - Set and communicate daily mobility goal to care team and patient/family/caregiver.   - Collaborate with rehabilitation services on mobility goals if consulted  - Out of bed for toileting  - Record patient progress and toleration of activity level   Outcome: Progressing     Problem: DISCHARGE PLANNING  Goal: Discharge to home or other facility with  appropriate resources  Description: INTERVENTIONS:  - Identify barriers to discharge w/patient and caregiver  - Arrange for needed discharge resources and transportation as appropriate  - Identify discharge learning needs (meds, wound care, etc.)  - Arrange for interpretive services to assist at discharge as needed  - Refer to Case Management Department for coordinating discharge planning if the patient needs post-hospital services based on physician/advanced practitioner order or complex needs related to functional status, cognitive ability, or social support system  Outcome: Progressing     Problem: Knowledge Deficit  Goal: Patient/family/caregiver demonstrates understanding of disease process, treatment plan, medications, and discharge instructions  Description: Complete learning assessment and assess knowledge base.  Interventions:  - Provide teaching at level of understanding  - Provide teaching via preferred learning methods  Outcome: Progressing     Problem: SKIN/TISSUE INTEGRITY - ADULT  Goal: Incision(s), wounds(s) or drain site(s) healing without S/S of infection  Description: INTERVENTIONS  - Assess and document dressing, incision, wound bed, drain sites and surrounding tissue  - Provide patient and family education  - Perform skin care/dressing changes every shift    Outcome: Progressing     Problem: MUSCULOSKELETAL - ADULT  Goal: Maintain or return mobility to safest level of function  Description: INTERVENTIONS:  - Assess patient's ability to carry out ADLs; assess patient's baseline for ADL function and identify physical deficits which impact ability to perform ADLs (bathing, care of mouth/teeth, toileting, grooming, dressing, etc.)  - Assess/evaluate cause of self-care deficits   - Assess range of motion  - Assess patient's mobility  - Assess patient's need for assistive devices and provide as appropriate  - Encourage maximum independence but intervene and supervise when necessary  - Involve family  in performance of ADLs  - Assess for home care needs following discharge   - Consider OT consult to assist with ADL evaluation and planning for discharge  - Provide patient education as appropriate  Outcome: Progressing

## 2025-05-14 NOTE — PLAN OF CARE
Problem: PAIN - ADULT  Goal: Verbalizes/displays adequate comfort level or baseline comfort level  Description: Interventions:  - Encourage patient to monitor pain and request assistance  - Assess pain using appropriate pain scale  - Administer analgesics as ordered based on type and severity of pain and evaluate response  - Implement non-pharmacological measures as appropriate and evaluate response  - Consider cultural and social influences on pain and pain management  - Notify physician/advanced practitioner if interventions unsuccessful or patient reports new pain  - Educate patient/family on pain management process including their role and importance of  reporting pain   - Provide non-pharmacologic/complimentary pain relief interventions  Outcome: Progressing     Problem: INFECTION - ADULT  Goal: Absence or prevention of progression during hospitalization  Description: INTERVENTIONS:  - Assess and monitor for signs and symptoms of infection  - Monitor lab/diagnostic results  - Monitor all insertion sites, i.e. indwelling lines, tubes, and drains  - Monitor endotracheal if appropriate and nasal secretions for changes in amount and color  - [place] appropriate cooling/warming therapies per order  - Administer medications as ordered  - Instruct and encourage patient and family to use good hand hygiene technique  - Identify and instruct in appropriate isolation precautions for identified infection/condition  Outcome: Progressing  Goal: Absence of fever/infection during neutropenic period  Description: INTERVENTIONS:  - Monitor WBC  - Perform strict hand hygiene  - Limit to healthy visitors only  - No plants, dried, fresh or silk flowers with burns in patient room  Outcome: Progressing     Problem: SAFETY ADULT  Goal: Patient will remain free of falls  Description: INTERVENTIONS:  - Educate patient/family on patient safety including physical limitations  - Instruct patient to call for assistance with activity   -  Consider consulting OT/PT to assist with strengthening/mobility based on AM PAC & JH-HLM score  - Consult OT/PT to assist with strengthening/mobility   - Keep Call bell within reach  - Keep bed low and locked with side rails adjusted as appropriate  - Keep care items and personal belongings within reach  - Initiate and maintain comfort rounds  - Make Fall Risk Sign visible to staff  - Consider moving patient to room near nurses station  Outcome: Progressing  Goal: Maintain or return to baseline ADL function  Description: INTERVENTIONS:  -  Assess patient's ability to carry out ADLs; assess patient's baseline for ADL function and identify physical deficits which impact ability to perform ADLs (bathing, care of mouth/teeth, toileting, grooming, dressing, etc.)  - Assess/evaluate cause of self-care deficits   - Assess range of motion  - Assess patient's mobility; develop plan if impaired  - Assess patient's need for assistive devices and provide as appropriate  - Encourage maximum independence but intervene and supervise when necessary  - Involve family in performance of ADLs  - Assess for home care needs following discharge   - Consider OT consult to assist with ADL evaluation and planning for discharge  - Provide patient education as appropriate  - Monitor functional capacity and physical performance, use of AM PAC & JH-HLM   - Monitor gait, balance and fatigue with ambulation    Outcome: Progressing  Goal: Maintains/Returns to pre admission functional level  Description: INTERVENTIONS:  - Perform AM-PAC 6 Click Basic Mobility/ Daily Activity assessment daily.  - Set and communicate daily mobility goal to care team and patient/family/caregiver.   - Collaborate with rehabilitation services on mobility goals if consulted  - Out of bed for toileting  - Record patient progress and toleration of activity level   Outcome: Progressing     Problem: DISCHARGE PLANNING  Goal: Discharge to home or other facility with  appropriate resources  Description: INTERVENTIONS:  - Identify barriers to discharge w/patient and caregiver  - Arrange for needed discharge resources and transportation as appropriate  - Identify discharge learning needs (meds, wound care, etc.)  - Arrange for interpretive services to assist at discharge as needed  - Refer to Case Management Department for coordinating discharge planning if the patient needs post-hospital services based on physician/advanced practitioner order or complex needs related to functional status, cognitive ability, or social support system  Outcome: Progressing     Problem: Knowledge Deficit  Goal: Patient/family/caregiver demonstrates understanding of disease process, treatment plan, medications, and discharge instructions  Description: Complete learning assessment and assess knowledge base.  Interventions:  - Provide teaching at level of understanding  - Provide teaching via preferred learning methods  Outcome: Progressing     Problem: SKIN/TISSUE INTEGRITY - ADULT  Goal: Incision(s), wounds(s) or drain site(s) healing without S/S of infection  Description: INTERVENTIONS  - Assess and document dressing, incision, wound bed, drain sites and surrounding tissue  - Provide patient and family education  - Perform skin care/dressing changes every shift    Outcome: Progressing     Problem: MUSCULOSKELETAL - ADULT  Goal: Maintain or return mobility to safest level of function  Description: INTERVENTIONS:  - Assess patient's ability to carry out ADLs; assess patient's baseline for ADL function and identify physical deficits which impact ability to perform ADLs (bathing, care of mouth/teeth, toileting, grooming, dressing, etc.)  - Assess/evaluate cause of self-care deficits   - Assess range of motion  - Assess patient's mobility  - Assess patient's need for assistive devices and provide as appropriate  - Encourage maximum independence but intervene and supervise when necessary  - Involve family  in performance of ADLs  - Assess for home care needs following discharge   - Consider OT consult to assist with ADL evaluation and planning for discharge  - Provide patient education as appropriate  Outcome: Progressing

## 2025-05-14 NOTE — ED NOTES
Pharmacy called regarding medications needing to be profiled and rescheduled for correct time, was stated that they will do so when patient gets a bed and to not do anything with them in the MAR or they cannot adjust it.      Manjinder Manning RN  05/14/25 [id number]

## 2025-05-14 NOTE — ASSESSMENT & PLAN NOTE
Wt Readings from Last 3 Encounters:   04/23/25 78.6 kg (173 lb 4.5 oz)   04/11/25 79.8 kg (176 lb)   03/04/25 76.2 kg (168 lb)     Patient currently appears euvolemic, chest x-ray without any evidence of acute heart failure  Currently on room air  Hold torsemide as UA did show hyaline casts indicating mild dehydration  Can likely resume torsemide tomorrow, previous dose of 40 mg twice daily

## 2025-05-14 NOTE — ASSESSMENT & PLAN NOTE
Previous urine cultures growing ESBL Klebsiella, report anaphylaxis to penicillins  Some concerns for possible infection, will give 1 dose of ertapenem and monitor at this time

## 2025-05-14 NOTE — ASSESSMENT & PLAN NOTE
Met SIRS criteria on admission, with tachycardia, elevated white count  Episode of hypotension that resolved with IV fluids  Suspect white count is likely reactive in setting of fall and laceration  UA concerning for possible infection though she has chronic Baetman catheter  Will give 1 dose of ertapenem, urine cultures pending

## 2025-05-14 NOTE — ASSESSMENT & PLAN NOTE
80-year-old female who presents from [facility] assisted living facility reportedly had a fall  Per patient's report, suspect likely mechanical fall with no preceding symptoms  Had laceration with wound treated by podiatry resident  Denies any loss of consciousness, head trauma  PT and OT to further evaluate as patient does report gait unsteadiness  At baseline uses rolling walker

## 2025-05-15 PROBLEM — N39.0 UTI (URINARY TRACT INFECTION): Status: ACTIVE | Noted: 2025-05-15

## 2025-05-15 PROBLEM — G89.29 CHRONIC PAIN: Status: ACTIVE | Noted: 2025-05-15

## 2025-05-15 PROBLEM — N18.9 CKD (CHRONIC KIDNEY DISEASE): Status: ACTIVE | Noted: 2025-05-15

## 2025-05-15 LAB
ANION GAP SERPL CALCULATED.3IONS-SCNC: 7 MMOL/L (ref 4–13)
ATRIAL RATE: 174 BPM
ATRIAL RATE: 91 BPM
BUN SERPL-MCNC: 20 MG/DL (ref 5–25)
CALCIUM SERPL-MCNC: 8.6 MG/DL (ref 8.4–10.2)
CHLORIDE SERPL-SCNC: 91 MMOL/L (ref 96–108)
CO2 SERPL-SCNC: 38 MMOL/L (ref 21–32)
CREAT SERPL-MCNC: 1.37 MG/DL (ref 0.6–1.3)
ERYTHROCYTE [DISTWIDTH] IN BLOOD BY AUTOMATED COUNT: 14.7 % (ref 11.6–15.1)
GFR SERPL CREATININE-BSD FRML MDRD: 35 ML/MIN/1.73SQ M
GLUCOSE P FAST SERPL-MCNC: 175 MG/DL (ref 65–99)
GLUCOSE SERPL-MCNC: 175 MG/DL (ref 65–140)
HCT VFR BLD AUTO: 26.5 % (ref 34.8–46.1)
HGB BLD-MCNC: 8.5 G/DL (ref 11.5–15.4)
MAGNESIUM SERPL-MCNC: 2.2 MG/DL (ref 1.9–2.7)
MCH RBC QN AUTO: 27.8 PG (ref 26.8–34.3)
MCHC RBC AUTO-ENTMCNC: 32.1 G/DL (ref 31.4–37.4)
MCV RBC AUTO: 87 FL (ref 82–98)
MRSA NOSE QL CULT: NORMAL
P AXIS: 65 DEGREES
P AXIS: 99 DEGREES
PLATELET # BLD AUTO: 222 THOUSANDS/UL (ref 149–390)
PMV BLD AUTO: 11 FL (ref 8.9–12.7)
POTASSIUM SERPL-SCNC: 3.4 MMOL/L (ref 3.5–5.3)
PR INTERVAL: 152 MS
QRS AXIS: -33 DEGREES
QRS AXIS: -40 DEGREES
QRSD INTERVAL: 132 MS
QRSD INTERVAL: 136 MS
QT INTERVAL: 428 MS
QT INTERVAL: 442 MS
QTC INTERVAL: 526 MS
QTC INTERVAL: 534 MS
RBC # BLD AUTO: 3.06 MILLION/UL (ref 3.81–5.12)
SODIUM SERPL-SCNC: 136 MMOL/L (ref 135–147)
T WAVE AXIS: 65 DEGREES
T WAVE AXIS: 75 DEGREES
VENTRICULAR RATE: 88 BPM
VENTRICULAR RATE: 91 BPM
WBC # BLD AUTO: 8.12 THOUSAND/UL (ref 4.31–10.16)

## 2025-05-15 PROCEDURE — 80048 BASIC METABOLIC PNL TOTAL CA: CPT | Performed by: STUDENT IN AN ORGANIZED HEALTH CARE EDUCATION/TRAINING PROGRAM

## 2025-05-15 PROCEDURE — 97163 PT EVAL HIGH COMPLEX 45 MIN: CPT

## 2025-05-15 PROCEDURE — 93010 ELECTROCARDIOGRAM REPORT: CPT | Performed by: STUDENT IN AN ORGANIZED HEALTH CARE EDUCATION/TRAINING PROGRAM

## 2025-05-15 PROCEDURE — 83735 ASSAY OF MAGNESIUM: CPT | Performed by: STUDENT IN AN ORGANIZED HEALTH CARE EDUCATION/TRAINING PROGRAM

## 2025-05-15 PROCEDURE — 85027 COMPLETE CBC AUTOMATED: CPT | Performed by: STUDENT IN AN ORGANIZED HEALTH CARE EDUCATION/TRAINING PROGRAM

## 2025-05-15 PROCEDURE — 97167 OT EVAL HIGH COMPLEX 60 MIN: CPT

## 2025-05-15 PROCEDURE — 99233 SBSQ HOSP IP/OBS HIGH 50: CPT | Performed by: PHYSICIAN ASSISTANT

## 2025-05-15 RX ORDER — CALCIUM CARBONATE 500 MG/1
500 TABLET, CHEWABLE ORAL 3 TIMES DAILY PRN
Status: DISCONTINUED | OUTPATIENT
Start: 2025-05-15 | End: 2025-05-19 | Stop reason: HOSPADM

## 2025-05-15 RX ADMIN — HEPARIN SODIUM 5000 UNITS: 5000 INJECTION INTRAVENOUS; SUBCUTANEOUS at 12:55

## 2025-05-15 RX ADMIN — LATANOPROST 1 DROP: 50 SOLUTION OPHTHALMIC at 21:28

## 2025-05-15 RX ADMIN — HEPARIN SODIUM 5000 UNITS: 5000 INJECTION INTRAVENOUS; SUBCUTANEOUS at 05:40

## 2025-05-15 RX ADMIN — FERROUS SULFATE TAB 325 MG (65 MG ELEMENTAL FE) 325 MG: 325 (65 FE) TAB at 08:21

## 2025-05-15 RX ADMIN — ONDANSETRON 4 MG: 2 INJECTION INTRAMUSCULAR; INTRAVENOUS at 06:25

## 2025-05-15 RX ADMIN — MORPHINE SULFATE 15 MG: 15 TABLET ORAL at 17:01

## 2025-05-15 RX ADMIN — ERTAPENEM SODIUM 500 MG: 1 INJECTION INTRAMUSCULAR; INTRAVENOUS at 17:01

## 2025-05-15 RX ADMIN — LEVOTHYROXINE SODIUM 37.5 MCG: 75 TABLET ORAL at 05:40

## 2025-05-15 RX ADMIN — MORPHINE SULFATE 15 MG: 15 TABLET ORAL at 12:55

## 2025-05-15 RX ADMIN — METHOCARBAMOL 750 MG: 750 TABLET ORAL at 05:40

## 2025-05-15 RX ADMIN — ACETAMINOPHEN 650 MG: 325 TABLET, FILM COATED ORAL at 19:40

## 2025-05-15 RX ADMIN — METOPROLOL SUCCINATE 12.5 MG: 25 TABLET, EXTENDED RELEASE ORAL at 08:21

## 2025-05-15 RX ADMIN — FAMOTIDINE 20 MG: 20 TABLET, FILM COATED ORAL at 08:21

## 2025-05-15 RX ADMIN — MORPHINE SULFATE 15 MG: 15 TABLET ORAL at 05:40

## 2025-05-15 RX ADMIN — HEPARIN SODIUM 5000 UNITS: 5000 INJECTION INTRAVENOUS; SUBCUTANEOUS at 21:28

## 2025-05-15 RX ADMIN — GABAPENTIN 300 MG: 300 CAPSULE ORAL at 21:28

## 2025-05-15 RX ADMIN — METHOCARBAMOL 750 MG: 750 TABLET ORAL at 12:55

## 2025-05-15 RX ADMIN — METHOCARBAMOL 750 MG: 750 TABLET ORAL at 21:28

## 2025-05-15 RX ADMIN — DOCUSATE SODIUM 100 MG: 100 CAPSULE, LIQUID FILLED ORAL at 08:21

## 2025-05-15 RX ADMIN — ATORVASTATIN CALCIUM 40 MG: 80 TABLET, FILM COATED ORAL at 17:01

## 2025-05-15 RX ADMIN — POTASSIUM CHLORIDE 20 MEQ: 1500 TABLET, EXTENDED RELEASE ORAL at 08:21

## 2025-05-15 RX ADMIN — OXYBUTYNIN CHLORIDE 5 MG: 5 TABLET ORAL at 21:28

## 2025-05-15 RX ADMIN — OXYBUTYNIN CHLORIDE 5 MG: 5 TABLET ORAL at 08:21

## 2025-05-15 RX ADMIN — ASPIRIN 81 MG: 81 TABLET, CHEWABLE ORAL at 08:22

## 2025-05-15 RX ADMIN — MORPHINE SULFATE 15 MG: 15 TABLET ORAL at 23:56

## 2025-05-15 RX ADMIN — LIDOCAINE 5% 1 PATCH: 700 PATCH TOPICAL at 08:21

## 2025-05-15 RX ADMIN — DOCUSATE SODIUM 100 MG: 100 CAPSULE, LIQUID FILLED ORAL at 17:01

## 2025-05-15 NOTE — ASSESSMENT & PLAN NOTE
Sepsis present on admission due to possible UTI a/e/b WBC 14.02,  and lactic acid 2.2 treated with IV Ertapenem, IVF, urine culture, lab monitoring and VS monitoring

## 2025-05-15 NOTE — ASSESSMENT & PLAN NOTE
Wt Readings from Last 3 Encounters:   04/23/25 78.6 kg (173 lb 4.5 oz)   04/11/25 79.8 kg (176 lb)   03/04/25 76.2 kg (168 lb)     Patient currently appears euvolemic to a bit dry on exam today. Not eating or drinking very well yet  chest x-ray reports- Low lung volumes with bibasilar atelectasis on the lateral projection   Was on room air but currently on 2L oxygen  Hold torsemide as UA did show hyaline casts indicating mild dehydration  Consider resuming torsemide tomorrow, previous dose of 40 mg twice daily

## 2025-05-15 NOTE — PROGRESS NOTES
Progress Note - Hospitalist   Name: Mattie Varela 80 y.o. female I MRN: [id number]  Unit/Bed#: E5 -01 I Date of Admission: 5/14/2025   Date of Service: 5/15/2025 I Hospital Day: 0    Assessment & Plan  Fall  80-year-old female who presents from [facility] assisted living facility reportedly had a fall  Per patient's report, suspect likely mechanical fall with no preceding symptoms  Had left lower leg laceration sutured by podiatry resident  Denies any loss of consciousness, head trauma  PT and OT to further evaluate as patient does report gait unsteadiness. PT recommending level II rehab services for discharge  At baseline uses rolling walker  Hypothyroidism  Continue levothyroxine  Chronic obstructive pulmonary disease, unspecified COPD type (HCC)  Albuterol as needed, not in any distress  Abnormal urinalysis  Previous urine cultures growing ESBL Klebsiella, report anaphylaxis to penicillins  given 1 dose of ertapenem 5/14  Urine culture now reporting-   >100,000 cfu/ml Klebsiella pneumoniae Abnormal    20,000-29,000 cfu/ml Pseudomonas aeruginosa Abnormal    >100,000 cfu/ml Enterococcus faecalis Abnormal    Will continue ertapenem for now pending sensitivities  Urinary retention  Chronic Bateman catheter  Chronic diastolic congestive heart failure (HCC)  Wt Readings from Last 3 Encounters:   04/23/25 78.6 kg (173 lb 4.5 oz)   04/11/25 79.8 kg (176 lb)   03/04/25 76.2 kg (168 lb)     Patient currently appears euvolemic to a bit dry on exam today. Not eating or drinking very well yet  chest x-ray reports- Low lung volumes with bibasilar atelectasis on the lateral projection   Was on room air but currently on 2L oxygen  Hold torsemide as UA did show hyaline casts indicating mild dehydration  Consider resuming torsemide tomorrow, previous dose of 40 mg twice daily  Gastro-esophageal reflux disease without esophagitis  Continue Pepcid  Complains of heartburn today- added PRN tums  SIRS (systemic inflammatory response  syndrome) (HCC)  Met SIRS criteria on admission, with tachycardia, elevated white count  Episode of hypotension that resolved with IV fluids  Suspect white count is likely reactive in setting of fall and laceration  WBC today 8.5  UA concerning for possible infection though she has chronic Bateman catheter  Started on ertapenem, will continue. Follow up on sensitivities  Iron deficiency anemia secondary to inadequate dietary iron intake  Hgb 8.5  Continue iron supplementation  Continue to monitor  Hypokalemia  K 3.4, up from 3.2 yesterday  Continue PO supplementation  CKD (chronic kidney disease)  Lab Results   Component Value Date    EGFR 35 05/15/2025    EGFR 30 05/14/2025    EGFR 35 04/23/2025    CREATININE 1.37 (H) 05/15/2025    CREATININE 1.56 (H) 05/14/2025    CREATININE 1.39 (H) 04/23/2025     Creat today appears to be around baseline  Torsemide remains on hold as she looks clinically dry  Chronic pain  With history of chronic pain disorder status post spinal stimulator with degenerative lumbar spinal stenosis   Likely contributing to falls  Home pain med regimen included fentanyl patch, PO morphine, gabapentin, scheduled robaxin  BP runs low at times, likely due to narcotics and decreased PO intake  Will consult geriatrics    VTE Pharmacologic Prophylaxis:   heparin    Mobility:   Basic Mobility Inpatient Raw Score: 11  JH-HLM Goal: 4: Move to chair/commode  JH-HLM Achieved: 4: Move to chair/commode  JH-HLM Goal achieved. Continue to encourage appropriate mobility.      Education and Discussions with Family / Patient: Updated  (daughter) via phone.    Current Length of Stay: 0 day(s)  Current Patient Status: Observation   Certification Statement: The patient will continue to require additional inpatient hospital stay due to continue IV antibiotics, needs close monitoring of VS and labs  Discharge Plan: Anticipate discharge in 24-48 hrs to back to assisted living vs short term rehab    Code  Status: Level 1 - Full Code    Subjective   Patient sitting up in chair. Pain controlled. Feels generally weak. No CP/SOB. Not eating or drinking well    Objective :  Temp:  [97.7 °F (36.5 °C)-98.8 °F (37.1 °C)] 98.4 °F (36.9 °C)  HR:  [] 108  BP: ()/(52-77) 120/69  Resp:  [16-18] 16  SpO2:  [89 %-98 %] 89 %  O2 Device: Nasal cannula  Nasal Cannula O2 Flow Rate (L/min):  [2 L/min] 2 L/min    Body mass index is 33.84 kg/m².     Input and Output Summary (last 24 hours):     Intake/Output Summary (Last 24 hours) at 5/15/2025 1343  Last data filed at 5/15/2025 0900  Gross per 24 hour   Intake 120 ml   Output 675 ml   Net -555 ml       Physical Exam  Vitals reviewed.   Constitutional:       General: She is not in acute distress.     Appearance: She is not diaphoretic.   HENT:      Head: Normocephalic and atraumatic.      Nose: Nose normal.      Mouth/Throat:      Mouth: Mucous membranes are dry.     Cardiovascular:      Rate and Rhythm: Normal rate.   Pulmonary:      Effort: Pulmonary effort is normal. No respiratory distress.   Abdominal:      General: There is no distension.      Palpations: Abdomen is soft.      Tenderness: There is no abdominal tenderness.     Musculoskeletal:         General: No deformity.     Skin:     General: Skin is warm and dry.     Neurological:      Mental Status: She is alert and oriented to person, place, and time.           Lines/Drains:  Lines/Drains/Airways       Active Status       Name Placement date Placement time Site Days    Urethral Catheter Latex 16 Fr. 04/22/25 2027  Latex  22                  Urinary Catheter:  Goal for removal: N/A - Chronic Bateman                 Lab Results: I have reviewed the following results:   Results from last 7 days   Lab Units 05/15/25  0456 05/14/25  0854   WBC Thousand/uL 8.12 14.02*   HEMOGLOBIN g/dL 8.5* 9.2*   HEMATOCRIT % 26.5* 28.5*   PLATELETS Thousands/uL 222 229   SEGS PCT %  --  82*   LYMPHO PCT %  --  10*   MONO PCT %  --  6    EOS PCT %  --  1     Results from last 7 days   Lab Units 05/15/25  0456 05/14/25  0854   SODIUM mmol/L 136 135   POTASSIUM mmol/L 3.4* 3.2*   CHLORIDE mmol/L 91* 92*   CO2 mmol/L 38* 36*   BUN mg/dL 20 19   CREATININE mg/dL 1.37* 1.56*   ANION GAP mmol/L 7 7   CALCIUM mg/dL 8.6 8.8   ALBUMIN g/dL  --  3.2*   TOTAL BILIRUBIN mg/dL  --  0.46   ALK PHOS U/L  --  116*   ALT U/L  --  8   AST U/L  --  14   GLUCOSE RANDOM mg/dL 175* 167*     Results from last 7 days   Lab Units 05/14/25  0854   INR  1.07             Results from last 7 days   Lab Units 05/14/25  1051 05/14/25  0854   LACTIC ACID mmol/L 1.8 2.2*   PROCALCITONIN ng/ml  --  <0.05       Recent Cultures (last 7 days):   Results from last 7 days   Lab Units 05/14/25  0945   URINE CULTURE  >100,000 cfu/ml Klebsiella pneumoniae*  20,000-29,000 cfu/ml Pseudomonas aeruginosa*  >100,000 cfu/ml Enterococcus faecalis*       Imaging Results Review: I reviewed radiology reports from this admission including: chest xray.      Last 24 Hours Medication List:     Current Facility-Administered Medications:     acetaminophen (TYLENOL) tablet 650 mg, Q6H PRN    albuterol (PROVENTIL HFA,VENTOLIN HFA) inhaler 2 puff, Q6H PRN    aspirin chewable tablet 81 mg, Daily    atorvastatin (LIPITOR) tablet 40 mg, Daily With Dinner    calcium carbonate (TUMS) chewable tablet 500 mg, TID PRN    docusate sodium (COLACE) capsule 100 mg, BID    ertapenem (INVanz) 500 mg in sodium chloride 0.9 % 50 mL IVPB, Q24H    famotidine (PEPCID) tablet 20 mg, Daily    fentaNYL (DURAGESIC) 75 mcg/hr TD 72 hr patch 1 patch, Q72H    ferrous sulfate tablet 325 mg, Daily With Breakfast    gabapentin (NEURONTIN) capsule 300 mg, HS    heparin (porcine) subcutaneous injection 5,000 Units, Q8H ARY    latanoprost (XALATAN) 0.005 % ophthalmic solution 1 drop, HS    levothyroxine tablet 37.5 mcg, Early Morning    lidocaine (LIDODERM) 5 % patch 1 patch, Daily    loperamide (IMODIUM) oral liquid 2 mg, TID PRN     methocarbamol (ROBAXIN) tablet 750 mg, Q8H ARY    metoprolol succinate (TOPROL-XL) 24 hr tablet 12.5 mg, Daily    morphine (MSIR) IR tablet 15 mg, Q6H    ondansetron (ZOFRAN) injection 4 mg, Q6H PRN    oxybutynin (DITROPAN) tablet 5 mg, BID    potassium chloride (Klor-Con M20) CR tablet 20 mEq, Daily    zolpidem (AMBIEN) tablet 5 mg, HS PRN    Administrative Statements   Today, Patient Was Seen By: Milvia Willis PA-C      **Please Note: This note may have been constructed using a voice recognition system.**

## 2025-05-15 NOTE — ASSESSMENT & PLAN NOTE
Met SIRS criteria on admission, with tachycardia, elevated white count  Episode of hypotension that resolved with IV fluids  Suspect white count is likely reactive in setting of fall and laceration  WBC today 8.5  UA concerning for possible infection though she has chronic Bateman catheter  Started on ertapenem, will continue. Follow up on sensitivities

## 2025-05-15 NOTE — PHYSICAL THERAPY NOTE
PT EVALUATION    Pt. Name: Mattie Varela  Pt. Age: 80 y.o.  MRN: [id number]  LENGTH OF STAY: 0      Admitting Diagnoses:   Lightheadedness [R42]  Hypotension [I95.9]  Leg laceration [S81.819A]    Past Medical History:   Diagnosis Date    Acid reflux     Acute kidney injury (HCC) 06/18/2022    Anemia     hx of iron-deficient    Anxiety     Arthritis     Asthma     last needed inhaler last year 2020    Basal cell carcinoma     upper lip    Chronic narcotic dependence (HCC)     Chronic pain     Colitis 11/19/2024    Colon polyp     Cystocele     Diverticulosis     Dizziness     at times    Dysfunctional uterine bleeding     last assessed - 07May2014    Dysphagia     Fibromyalgia     Gastric ulcer     Gastroparesis     History of colonic polyps     last assessed - 09Feb2016    History of gastroesophageal reflux (GERD)     Hypercholesterolemia     Hyperlipidemia     Hypertension     Hyponatremia 01/13/2024    IBS (irritable bowel syndrome)     Pneumobilia 06/18/2022    Post laminectomy syndrome     S/P insertion of spinal cord stimulator 07/18/2018    s/p [employer] loop recorder 3/2/2024 03/02/2024    Seasonal allergies     Shortness of breath     exertional    Spinal stenosis     Status post lumbar spinal fusion 03/16/2018    Stroke ([facility])     pt states slight stroke March 2022       Past Surgical History:   Procedure Laterality Date    APPENDECTOMY      BACK SURGERY      BREAST CYST EXCISION Left     CARDIAC CATHETERIZATION  11/14/2023    Procedure: Cardiac catheterization;  Surgeon: Randolph Holt MD;  [facility];  Service: Cardiology    CARDIAC CATHETERIZATION N/A 11/14/2023    Procedure: Cardiac Coronary Angiogram;  Surgeon: Randolph Holt MD;  [facility];  Service: Cardiology    CARDIAC ELECTROPHYSIOLOGY PROCEDURE N/A 3/2/2024    Procedure: Cardiac loop recorder implant;  Surgeon: Edu Fontaine MD;  [facility];  Service: Cardiology     CHOLECYSTECTOMY      COLONOSCOPY      ESOPHAGOGASTRODUODENOSCOPY N/A 09/28/2016    Procedure: ESOPHAGOGASTRODUODENOSCOPY (EGD);  Surgeon: Mylene Moeller MD;  Location: [facility];  Service:     HERNIA REPAIR      HYSTERECTOMY      TTAH-BSO age 28    LAMINECTOMY      LUMBAR LAMINECTOMY      OOPHORECTOMY      age 28    WA ARTHRODESIS POSTERIOR/PSTLAT TQ 1NTRSPC THORACIC N/A 06/04/2018    Procedure: Reopening of lumbar incision for T12-L5 posterior instrumented fixation and fusion and T12-L4 posterior decompression;  Surgeon: Wood Rogel MD;  Location: [facility];  Service: Neurosurgery    WA COLONOSCOPY FLX DX W/COLLJ SPEC WHEN PFRMD N/A 03/02/2016    Procedure: EGD AND COLONOSCOPY;  Surgeon: Mylene Moeller MD;  [facility]: [facility];  Service: Gastroenterology    WA DILATION ESOPH UNGUIDED SOUND/BOUGIE 1/MULT PASS N/A 09/28/2016    Procedure: DILATATION ESOPHAGEAL;  Surgeon: Mylene Moeller MD;  [facility]: [facility];  Service: Gastroenterology    WA ESOPHAGOGASTRODUODENOSCOPY TRANSORAL DIAGNOSTIC N/A 07/18/2016    Procedure: ESOPHAGOGASTRODUODENOSCOPY (EGD);  Surgeon: Mylene Moeller MD;  [facility];  Service: Gastroenterology    WA INSJ/RPLCMT SPINAL NPG/RCVR POCKET CRTJ&CONNJ Left 06/04/2018    Procedure: removal of left buttock implantable pulse generator and placement of new  implantable pulse generator;  Surgeon: Wood Rogel MD;  [facility]: [facility];  Service: Neurosurgery       Imaging Studies:  XR chest 2 views   Final Result by Edwige Marcial MD (05/14 0930)      Low lung volumes with bibasilar atelectasis on the lateral projection.            Workstation performed: [id number]               05/15/25 0950   PT Last Visit   PT Visit Date 05/15/25   Note Type   Note type Evaluation   Pain Assessment   Pain Score 5   Pain Location/Orientation Orientation: Left;Location: Leg   Hospital Pain Intervention(s) Repositioned;Ambulation/increased activity;Emotional support;Rest   Restrictions/Precautions    Weight Bearing Precautions Per Order No   Other Precautions Cognitive;Chair Alarm;Bed Alarm;Fall Risk;Pain   Home Living   Type of Home Assisted living  ([facility])   Home Layout One level   Home Equipment Walker   Prior Function   Level of [place] Needs assistance with IADLS;Independent with functional mobility;Needs assistance with ADLs  (ambulates w/ RW; assist w/ showers, meals & meds)   Lives With Facility staff   Receives Help From Personal care attendant   Falls in the last 6 months 1 to 4  (1x)   Vocational Retired   Comments (-)    General   Family/Caregiver Present No   Cognition   Overall Cognitive Status Impaired   Arousal/Participation Alert   Orientation Level Oriented to person;Oriented to place;Oriented to time   Following Commands Follows one step commands with increased time or repetition   Comments cooperative   Subjective   Subjective Pt agreeable to PT/OT evals.   RUE Assessment   RUE Assessment   (refer to OT)   LUE Assessment   LUE Assessment   (refer to OT)   RLE Assessment   RLE Assessment X  (3-/5 grossly)   LLE Assessment   LLE Assessment X  (not tested 2* to pain)   Bed Mobility   Supine to Sit 3  Moderate assistance   Additional items Assist x 2;HOB elevated;Bedrails;Increased time required;Verbal cues;LE management   Additional Comments cues for techniques & safety   Transfers   Sit to Stand 3  Moderate assistance   Additional items Assist x 2;Increased time required;Verbal cues   Stand to Sit 3  Moderate assistance   Additional items Assist x 2;Increased time required;Verbal cues   Additional Comments w/ RW; B/L knee buckling noted during initial standing; require cues for techniques & safety   Ambulation/Elevation   Gait pattern Forward Flexion;Wide RUBIN;Decreased foot clearance;Shuffling;Short stride;Excessively slow   Gait Assistance 3  Moderate assist   Additional items Assist x 2;Verbal cues;Tactile cues   Assistive Device Rolling walker   Distance 4'x1    Ambulation/Elevation Additional Comments unsteady gait but no gross LOB noted; dec amb tolerance 2* to weakness, pain & fatigue   Balance   Static Sitting Fair -   Dynamic Sitting Poor +   Static Standing Poor +  (w/ RW)   Dynamic Standing Poor  (w/ RW)   Ambulatory Poor -  (w/ RW)   Endurance Deficit   Endurance Deficit Yes   Endurance Deficit Description weakness; fatigue; pain   Activity Tolerance   Activity Tolerance Patient limited by fatigue;Patient limited by pain;Treatment limited secondary to medical complications (Comment)   Medical Staff Made Aware OTR Teresa   Nurse Made Aware yes, Arelis   Assessment   Prognosis Fair   Problem List Decreased strength;Decreased endurance;Impaired balance;Decreased mobility;Decreased cognition;Impaired judgement;Decreased safety awareness;Pain   Assessment Pt. 80 y.o.female presented after a fall at [facility] w/ L leg deep laceration. Pt admitted for Fall w/ leg laceration which required stitches; UTI; SIRS. Pt referred to PT for mobility assessment & D/C planning. Please see above for information re: home set-up & PLOF as well as objective findings during PT assessment. PTA, pt resident of [facility]. Pt reports being I w/ RW however require assistance w/ showers, meals & med management at baseline. On eval, pt functioning below baseline hence will continue skilled PT to improve function & safety. Pt require modAx2 for bed mobility, transfers & amb w/ RW + cues for techniques & safety. Gait deviations as above but no gross LOB noted. Dec amb tolerance 2* to weakness, fatigue & pain. (+) dizziness upon sitting EOB w/ /69. The patient's AM-PAC Basic Mobility Inpatient Short Form Raw Score is 11. A Raw score of less than or equal to 16 suggests the patient may benefit from discharge to post-acute rehabilitation services. Please also refer to the recommendation of the Physical Therapist for safe discharge planning. From PT standpoint, will recommend Level II (moderate  resource intensity) rehab services at D/C, pending progress & EDWIN requirements to return. No SOB reported t/o session. Nsg staff most recent vital signs as follows: /69   Pulse (!) 108   Temp 98.4 °F (36.9 °C)   Resp 16   Ht 5' (1.524 m)   LMP  (LMP Unknown)   SpO2 (!) 89%   BMI 33.84 kg/m² . At end of session, pt OOB in chair in stable condition, call bell & phone in reach, chair alarm activated. Fall precautions reinforced w/ good understanding. CM to follow. Nsg staff to continue to mobilized pt (OOB in chair for all meals & ambulate in room) as tolerated to prevent further decline in function. Will also recommend Restorative for daily amb &/or daily OOB in chair as appropriate. Nsg notified. Co-eval was necessary to complete this PT eval for the pt's best interest given pt's medical acuity & complexity.   Goals   Patient Goals to feel better   [facility] Expiration Date 05/29/25   Short Term Goal #1 Goals to be met in 14 days; pt will be able to: 1) inc strength & balance by 1/2 grade to improve overall functional mobility & dec fall risk; 2) inc bed mobility to S for pt to be able to get in/OOB safely w/ proper techniques 100% of the time, to dec caregiver burden & safely function at home; 3) inc transfers to S for pt to transition safely from one surface to another w/o % of the time, to dec caregiver burden & safely function at home; 4) inc amb w/ RW approx. >150' w/ S for pt to ambulate household distances w/o any % of the time, to dec caregiver burden & safely function at home; 5) pt/caregiver ed   PT Treatment Day 0   Plan   Treatment/Interventions Functional transfer training;LE strengthening/ROM;Therapeutic exercise;Endurance training;Patient/family training;Bed mobility;Gait training;Spoke to nursing;OT;Spoke to case management   PT Frequency 3-5x/wk   Discharge Recommendation   Rehab Resource Intensity Level, PT II (Moderate Resource Intensity)  (pending progress & intermediate requirements  to return)   Equipment Recommended Walker  (pt has)   Additional Comments restorative for daily OOB in chair &/or daily amb as appropriate   AM-PAC Basic Mobility Inpatient   Turning in Flat Bed Without Bedrails 2   Lying on Back to Sitting on Edge of Flat Bed Without Bedrails 2   Moving Bed to Chair 2   Standing Up From Chair Using Arms 2   Walk in Room 2   Climb 3-5 Stairs With Railing 1   Basic Mobility Inpatient Raw Score 11   Basic Mobility Standardized Score 30.25   [facility] Highest Level Of Mobility   -HL Goal 4: Move to chair/commode   -HL Achieved 4: Move to chair/commode   End of Consult   Patient Position at End of Consult Bedside chair;Bed/Chair alarm activated;All needs within reach   End of Consult Comments Pt in stable condition. All needs in reach. Chair alarm sensor pad in placed however no available alarm box & cord available. RN made aware.   Hx/personal factors: co-morbidities, advanced age, use of AD, dec cognition, pain, h/o of falls, fall risk, and assist w/ ADL's  Examination: dec mobility, dec balance, dec endurance, dec amb, risk for falls, pain  Clinical: unpredictable (ongoing medical status, abnormal lab values, risk for falls, and pain mgt)  Complexity: high    Cosme Natalie

## 2025-05-15 NOTE — ASSESSMENT & PLAN NOTE
Continue levothyroxine   C/o epigastric abdominal pain starting tonight. No fevers, no vomiting. Pt awake and alert in triage, no increased WOB. Abdomen soft, nondistended, nontender. BCR. Advil 2300.

## 2025-05-15 NOTE — UTILIZATION REVIEW
Initial Clinical Review    OBS 5/14 @ 1235 UPGRADED TO INPATIENT 5/15 @ 1400 for UTI WITH NEED FOR CONTINUE ABX    Admission: Date/Time/Statement:   Admission Orders (From admission, onward)       Ordered        05/15/25 1400  INPATIENT ADMISSION  Once            05/14/25 1235  Place in Observation  Once                          Orders Placed This Encounter   Procedures    INPATIENT ADMISSION     Standing Status:   Standing     Number of Occurrences:   1     Level of Care:   Med Surg [16]     Estimated length of stay:   More than 2 Midnights     Certification:   I certify that inpatient services are medically necessary for this patient for a duration of greater than two midnights. See H&P and MD Progress Notes for additional information about the patient's course of treatment.     ED Arrival Information       Expected   -    Arrival   5/14/2025 07:32    Acuity   Urgent              Means of arrival   Ambulance    Escorted by   [facility] ([facility])    Service   Hospitalist    Admission type   Emergency              Arrival complaint   Fall             Chief Complaint   Patient presents with    Laceration     Pt was walking with walker this am when she fell back, slicing right lower extremity around the ankle and approx mid calf, thinks she cut it on a metal basket that was nearby. Significant bleeding that is now controlled, deep lacerations noted. Takes daily aspirin        Initial Presentation: 80 y.o. female to ED by ems from her assisted living facility s/p fall. Per SNF, suspect likely mechanical fall with no preceding symptoms. Denies any loc or head trauma.  On presentation tachycardic, WBC 14.02. K 3.2. LLE lac noted. Sutured in ED.  Previous urine cultures growing ESBL Klebsiella, report anaphylaxis to penicillins. Some concerns for possible infection, will give 1 dose of ertapenem and monitor at this time. Episode of hypotension that resolved with IV fluids, po suppl.    Admitted under  observation to MS unit d/t fall -- PT/OT evals. Continue PTA po meds, holding torsemide. Chronic ahn cath intact. UA concerning for possible infection. Hold on further abx, f/u ucx.        Anticipated Length of Stay/Certification Statement: Patient will be admitted on an observation basis with an anticipated length of stay of less than 2 midnights secondary to possible UTI, fall, PT/OT evaluatoin.     Date: 5/15   Day 2: Upgraded to Inpatient   Urine culture now reporting-   >100,000 cfu/ml Klebsiella pneumoniae Abnormal    20,000-29,000 cfu/ml Pseudomonas aeruginosa Abnormal    >100,000 cfu/ml Enterococcus faecalis Abnormal    Will continue ertapenem for now pending sensitivities. Currently on 2L nc. Continue to hold torsemide, consider resuming tomorrow. C/o heartburn today, added prn Tums. Hgb 8.5, continue iron suppl. K 3.4 from 3.2 yesterday, continue po suppl. Monitor BMP, CBC in AM.    Date: 5/16  Day 3: Has surpassed a 2nd midnight with active treatments and services for UTI, electrolyte abnl, weakness with safe d/c plan.  Continue abx, transition to po today. Resume torsemide at 1/2 home dose, 5/16 as Cr has normalized, 1.13. Pt typically takes 40 mg BID, resumed 40 mg daily 5/16, if Cr stable, resume twice daily dosing tomorrow. K 3.3 today, continue po suppl, plus additional 40 mEq today.  Plan to [facility] back to [facility].       ED Treatment-Medication Administration from 05/14/2025 0732 to 05/14/2025 1451         Date/Time Order Dose Route Action     05/14/2025 0907 multi-electrolyte (Plasmalyte-A/Isolyte-S PH 7.4/Normosol-R) IV bolus 500 mL 500 mL Intravenous New Bag     05/14/2025 1201 morphine (MSIR) IR tablet 15 mg 15 mg Oral Given     05/14/2025 0928 acetaminophen (TYLENOL) tablet 975 mg 975 mg Oral Given       Scheduled Medications:  aspirin, 81 mg, Oral, Daily  atorvastatin, 40 mg, Oral, Daily With Dinner  docusate sodium, 100 mg, Oral, BID  ertapenem, 500 mg, Intravenous, Q24H  famotidine, 20 mg, Oral,  Daily  fentaNYL, 1 patch, Transdermal, Q72H  ferrous sulfate, 325 mg, Oral, Daily With Breakfast  gabapentin, 300 mg, Oral, HS  heparin (porcine), 5,000 Units, Subcutaneous, Q8H ARY  latanoprost, 1 drop, Both Eyes, HS  levothyroxine, 37.5 mcg, Oral, Early Morning  lidocaine, 1 patch, Topical, Daily  methocarbamol, 750 mg, Oral, Q8H ARY  metoprolol succinate, 12.5 mg, Oral, Daily  morphine, 15 mg, Oral, Q6H  nystatin, , Topical, BID  oxybutynin, 5 mg, Oral, BID  potassium chloride, 20 mEq, Oral, Daily  potassium chloride, 40 mEq, Oral, Once  torsemide, 40 mg, Oral, Daily    PRN Meds:  acetaminophen, 650 mg, Oral, Q6H PRN  albuterol, 2 puff, Inhalation, Q6H PRN  calcium carbonate, 500 mg, Oral, TID PRN  loperamide, 2 mg, Oral, TID PRN  ondansetron, 4 mg, Intravenous, Q6H PRN  zolpidem, 5 mg, Oral, HS PRN      ED Triage Vitals   Temperature Pulse Respirations Blood Pressure SpO2 Pain Score   05/14/25 0742 05/14/25 0736 05/14/25 0736 05/14/25 0736 05/14/25 0736 05/14/25 0928   98.3 °F (36.8 °C) 100 17 97/50 96 % 9     Weight (last 2 days)       Date/Time Weight    05/15/25 23:04:29 78.6 (173.28)            Vital Signs (last 3 days)       Date/Time Temp Pulse Resp BP MAP (mmHg) SpO2 Calculated FIO2 (%) - Nasal Cannula Nasal Cannula O2 Flow Rate (L/min) O2 Device Patient Position - Orthostatic VS [place] Coma Scale Score Pain    05/16/25 07:30:53 97.9 °F (36.6 °C) 78 16 104/58 73 96 % -- -- None (Room air) Sitting -- --    05/15/25 5766 -- -- -- -- -- -- -- -- -- -- -- 8    05/15/25 23:04:29 97.5 °F (36.4 °C) -- -- 109/60 76 -- -- -- -- -- -- --    05/15/25 1940 -- -- -- -- -- -- -- -- -- -- -- 9    05/15/25 1930 -- -- -- -- -- -- -- -- None (Room air) -- 15 --    05/15/25 1701 -- -- -- -- -- -- -- -- -- -- -- 6    05/15/25 1516 97.9 °F (36.6 °C) 90 17 113/68 83 92 % -- -- None (Room air) Lying -- --    05/15/25 1255 -- -- -- -- -- -- -- -- -- -- -- 7    05/15/25 0950 -- -- -- -- -- -- -- -- -- -- -- 5    05/15/25  09:42:23 -- 108 -- 120/69 86 89 % -- -- -- -- -- --    05/15/25 0933 -- -- -- -- -- -- -- -- -- -- -- 5    05/15/25 0825 -- -- -- -- -- 92 % 28 2 L/min Nasal cannula -- -- No Pain    05/15/25 07:52:16 98.4 °F (36.9 °C) 103 -- 124/63 83 91 % -- -- -- -- -- --    05/15/25 07:52:05 98.4 °F (36.9 °C) 102 -- 124/63 83 92 % -- -- -- -- -- --    05/15/25 0540 -- -- -- -- -- -- -- -- -- -- -- 5    05/15/25 05:39:12 -- 99 -- 102/59 73 92 % -- -- -- -- -- --    05/15/25 01:03:33 -- 95 -- 128/63 85 98 % -- -- -- -- -- --    05/14/25 2345 -- -- -- -- -- -- -- -- -- -- -- 9    05/14/25 23:19:49 97.7 °F (36.5 °C) 94 16 145/68 94 92 % -- -- None (Room air) Lying -- --    05/14/25 19:52:52 98.2 °F (36.8 °C) 96 18 148/77 101 95 % -- -- None (Room air) Sitting -- --    05/14/25 1909 -- -- -- -- -- -- -- -- -- -- -- 2    05/14/25 1810 -- -- -- -- -- -- -- -- -- -- -- 10 - Worst Possible Pain    05/14/25 15:02:01 98.8 °F (37.1 °C) 93 -- 100/62 75 92 % -- -- -- -- 15 10 - Worst Possible Pain    05/14/25 1415 -- 90 16 90/52 65 95 % -- -- None (Room air) Sitting -- --    05/14/25 1315 -- 94 15 95/52 67 95 % -- -- None (Room air) Sitting -- --    05/14/25 1231 -- -- -- -- -- -- -- -- -- -- -- 4 05/14/25 1215 -- 86 15 133/65 91 95 % -- -- None (Room air) Lying -- --    05/14/25 1201 -- -- -- -- -- -- -- -- -- -- -- 9 05/14/25 1145 -- 94 15 118/58 83 94 % -- -- None (Room air) Lying -- --    05/14/25 1045 -- 88 15 105/58 75 95 % -- -- None (Room air) Sitting -- --    05/14/25 1015 -- 86 12 112/55 79 95 % -- -- None (Room air) Sitting -- --    05/14/25 0958 -- -- -- -- -- -- -- -- -- -- -- 4 05/14/25 0928 -- -- -- -- -- -- -- -- -- -- -- 9 05/14/25 0915 -- 88 16 113/57 77 -- -- -- -- -- -- --    05/14/25 0911 -- 89 16 113/57 -- 95 % -- -- None (Room air) Lying -- --    05/14/25 0845 -- 87 16 92/47 68 96 % -- -- None (Room air) Lying -- --    05/14/25 0808 -- -- -- -- -- -- -- -- -- -- 15 --    05/14/25 0742 98.3 °F (36.8 °C) -- --  -- -- -- -- -- -- -- -- --    05/14/25 0736 -- 100 17 97/50 -- 96 % -- -- None (Room air) Sitting -- --            Pertinent Labs/Diagnostic Test Results:   Radiology:  XR chest 2 views   Final Interpretation by Edwige Marcial MD (05/14 0930)      Low lung volumes with bibasilar atelectasis on the lateral projection.            Workstation performed: [id number]           Cardiology:  ECG 12 lead   Final Result by Natan Guzman MD (05/15 0826)   Sinus rhythm   Left axis deviation   Non-specific intra-ventricular conduction block   Possible Anterolateral infarct (cited on or before 19-Jan-2025)   Abnormal ECG   When compared with ECG of 14-May-2025 09:06, (unconfirmed)   Current undetermined rhythm precludes rhythm comparison, needs review   Confirmed by Natan Guzman ([id number]) on 5/15/2025 8:26:19 AM      ECG 12 lead   Final Result by Natan Guzman MD (05/15 0826)   Normal sinus rhythm   Left axis deviation   Non-specific intra-ventricular conduction block   Cannot rule out Anterior infarct (cited on or before 19-Jan-2025)   Abnormal ECG   When compared with ECG of 05-Apr-2025 00:48,   QRS axis Shifted left   T wave inversion less evident in Lateral leads   Confirmed by Natan Guzman ([id number]) on 5/15/2025 8:26:22 AM        Results from last 7 days   Lab Units 05/16/25  0455 05/15/25  0456 05/14/25  0854   WBC Thousand/uL 9.61 8.12 14.02*   HEMOGLOBIN g/dL 7.4* 8.5* 9.2*   HEMATOCRIT % 23.6* 26.5* 28.5*   PLATELETS Thousands/uL 223 222 229   TOTAL NEUT ABS Thousands/µL  --   --  11.45*     Results from last 7 days   Lab Units 05/16/25  0455 05/15/25  0456 05/14/25  0854   SODIUM mmol/L 136 136 135   POTASSIUM mmol/L 3.3* 3.4* 3.2*   CHLORIDE mmol/L 95* 91* 92*   CO2 mmol/L 36* 38* 36*   ANION GAP mmol/L 5 7 7   BUN mg/dL 16 20 19   CREATININE mg/dL 1.13 1.37* 1.56*   EGFR ml/min/1.73sq m 45 35 30   CALCIUM mg/dL 8.6 8.6 8.8   MAGNESIUM mg/dL  --  2.2  --      Results from last 7 days   Lab  Units 05/14/25  0854   AST U/L 14   ALT U/L 8   ALK PHOS U/L 116*   TOTAL PROTEIN g/dL 6.1*   ALBUMIN g/dL 3.2*   TOTAL BILIRUBIN mg/dL 0.46       Results from last 7 days   Lab Units 05/16/25  0455 05/15/25  0456 05/14/25  0854   GLUCOSE RANDOM mg/dL 205* 175* 167*     Results from last 7 days   Lab Units 05/14/25  1051 05/14/25  0854   HS TNI 0HR ng/L  --  11   HS TNI 2HR ng/L 13  --    HSTNI D2 ng/L 2  --      Results from last 7 days   Lab Units 05/14/25  0854   PROTIME seconds 14.1   INR  1.07   PTT seconds 28     Results from last 7 days   Lab Units 05/14/25  0854   PROCALCITONIN ng/ml <0.05     Results from last 7 days   Lab Units 05/14/25  1051 05/14/25  0854   LACTIC ACID mmol/L 1.8 2.2*     Results from last 7 days   Lab Units 05/14/25  0945   CLARITY UA  Cloudy   COLOR UA  Yellow   SPEC GRAV UA  1.020   PH UA  6.5   GLUCOSE UA mg/dl Negative   KETONES UA mg/dl Negative   BLOOD UA  Moderate*   PROTEIN UA mg/dl 30 (1+)*   NITRITE UA  Negative   BILIRUBIN UA  Negative   UROBILINOGEN UA E.U./dl 0.2   LEUKOCYTES UA  Small*   WBC UA /hpf Innumerable*   RBC UA /hpf 20-30*   BACTERIA UA /hpf Innumerable*   EPITHELIAL CELLS WET PREP /hpf Occasional   MUCUS THREADS  Occasional*     Results from last 7 days   Lab Units 05/14/25  0945   URINE CULTURE  >100,000 cfu/ml Klebsiella pneumoniae*  20,000-29,000 cfu/ml Pseudomonas aeruginosa*  >100,000 cfu/ml Enterococcus faecalis*       Past Medical History:   Diagnosis Date    Acid reflux     Acute kidney injury (HCC) 06/18/2022    Anemia     hx of iron-deficient    Anxiety     Arthritis     Asthma     last needed inhaler last year 2020    Basal cell carcinoma     upper lip    Chronic narcotic dependence (HCC)     Chronic pain     Colitis 11/19/2024    Colon polyp     Cystocele     Diverticulosis     Dizziness     at times    Dysfunctional uterine bleeding     last assessed - 07May2014    Dysphagia     Fibromyalgia     Gastric ulcer     Gastroparesis     History of  colonic polyps     last assessed - [id number]    History of gastroesophageal reflux (GERD)     Hypercholesterolemia     Hyperlipidemia     Hypertension     Hyponatremia 01/13/2024    IBS (irritable bowel syndrome)     Pneumobilia 06/18/2022    Post laminectomy syndrome     S/P insertion of spinal cord stimulator 07/18/2018    s/p [employer] loop recorder 3/2/2024 03/02/2024    Seasonal allergies     Shortness of breath     exertional    Spinal stenosis     Status post lumbar spinal fusion 03/16/2018    Stroke ([facility])     pt states slight stroke March 2022     Present on Admission:   Hypothyroidism   Chronic obstructive pulmonary disease, unspecified COPD type ([facility])   Chronic diastolic congestive heart failure ([facility])   Gastro-esophageal reflux disease without esophagitis   Abnormal urinalysis   Urinary retention   Hypokalemia   Iron deficiency anemia secondary to inadequate dietary iron intake   Sepsis ([facility])      Admitting Diagnosis: Lightheadedness [R42]  Hypotension [I95.9]  Leg laceration [S81.819A]  Age/Sex: 80 y.o. female    Network Utilization Review Department  ATTENTION: Please call with any questions or concerns to [phone] and carefully listen to the prompts so that you are directed to the right person. All voicemails are confidential.   For Discharge needs, contact Care Management DC Support Team at [phone] opt. [phone]  Send all requests for admission clinical reviews, approved or denied determinations and any other requests to dedicated fax number below belonging to the campus where the patient is receiving treatment. List of dedicated fax numbers for the Facilities:  FACILITY NAME UR FAX NUMBER   ADMISSION DENIALS (Administrative/Medical Necessity) [phone]   DISCHARGE SUPPORT TEAM (NETWORK) [phone]   PARENT CHILD HEALTH (Maternity/NICU/Pediatrics) [phone]   [facility] [phone]   [facility] [phone]   [facility]  [facility] [phone]   [facility] [phone]   [facility] [phone]   [facility] [phone]   [facility] [phone]   [facility] [phone]   [facility] [phone]   [facility] [phone]   [facility] [phone]   [facility] [phone]

## 2025-05-15 NOTE — ASSESSMENT & PLAN NOTE
Previous urine cultures growing ESBL Klebsiella, report anaphylaxis to penicillins  given 1 dose of ertapenem 5/14  Urine culture now reporting-   >100,000 cfu/ml Klebsiella pneumoniae Abnormal    20,000-29,000 cfu/ml Pseudomonas aeruginosa Abnormal    >100,000 cfu/ml Enterococcus faecalis Abnormal    Will continue ertapenem for now pending sensitivities

## 2025-05-15 NOTE — CASE MANAGEMENT
Case Management Progress Note    Patient name Mattie Varela  Location [facility] 5 /E5 -* MRN [id number]  : 1945 Date 5/15/2025       LOS (days): 0  Geometric Mean LOS (GMLOS) (days): 2.6  Days to GMLOS:2.5        OBJECTIVE:     Current admission status: Inpatient  Preferred Pharmacy:   [facility] [place] - [address]  [address]  [address]  [facility] [address]  Phone: [phone] Fax: [phone]    [facility] [place], [place] - [address]  [facility],  [address]  First [address] [place]  [facility] [address]  Phone: [phone] Fax: [phone]    [facility] [place] [place] - [address]  [address]  [place] [address]  Phone: [phone] Fax: [phone]    Primary Care Provider: Halley Clark MD    Primary Insurance: [employer] [facility]  Secondary Insurance:     PROGRESS NOTE:    CM called and left a message for Catrachita,  at [employer], at [phone] ext. [phone] to explain pt will need rehab. Cm sent referrals via [employer] for rehab. Cm also called and tried to speak with  at [facility] at [phone] but was placed on hold and no one ever picked up.

## 2025-05-15 NOTE — ASSESSMENT & PLAN NOTE
UTI possibly due to indwelling urethral catheter, present on admission a/e/b chronic ahn catheter and urine culture treated with IV IV Ertapenem, UA, urine culture, lab monitoring and VS monitoring         Findings: 5/15 Pharmacy: IV Ertapenem- Indication: urinary tract infection  5/15 IM- Urinary retention- Chronic Ahn catheter  UA concerning for possible infection though she has chronic Ahn catheter              Urine culture now reporting-               >100,000 cfu/ml Klebsiella pneumoniae Abnormal               20,000-29,000 cfu/ml Pseudomonas aeruginosa Abnormal               >100,000 cfu/ml Enterococcus faecalis Abnormal  Will continue ertapenem for now pending sensitivities

## 2025-05-15 NOTE — ASSESSMENT & PLAN NOTE
With history of chronic pain disorder status post spinal stimulator with degenerative lumbar spinal stenosis   Likely contributing to falls  Home pain med regimen included fentanyl patch, PO morphine, gabapentin, scheduled robaxin  BP runs low at times, likely due to narcotics and decreased PO intake  Will consult geriatrics

## 2025-05-15 NOTE — ASSESSMENT & PLAN NOTE
Lab Results   Component Value Date    EGFR 35 05/15/2025    EGFR 30 05/14/2025    EGFR 35 04/23/2025    CREATININE 1.37 (H) 05/15/2025    CREATININE 1.56 (H) 05/14/2025    CREATININE 1.39 (H) 04/23/2025     Creat today appears to be around baseline  Torsemide remains on hold as she looks clinically dry

## 2025-05-15 NOTE — ASSESSMENT & PLAN NOTE
80-year-old female who presents from [facility] assisted living facility reportedly had a fall  Per patient's report, suspect likely mechanical fall with no preceding symptoms  Had left lower leg laceration sutured by podiatry resident  Denies any loss of consciousness, head trauma  PT and OT to further evaluate as patient does report gait unsteadiness. PT recommending level II rehab services for discharge  At baseline uses rolling walker

## 2025-05-15 NOTE — PLAN OF CARE
Problem: PAIN - ADULT  Goal: Verbalizes/displays adequate comfort level or baseline comfort level  Description: Interventions:  - Encourage patient to monitor pain and request assistance  - Assess pain using appropriate pain scale  - Administer analgesics as ordered based on type and severity of pain and evaluate response  - Implement non-pharmacological measures as appropriate and evaluate response  - Consider cultural and social influences on pain and pain management  - Notify physician/advanced practitioner if interventions unsuccessful or patient reports new pain  - Educate patient/family on pain management process including their role and importance of  reporting pain   - Provide non-pharmacologic/complimentary pain relief interventions  Outcome: Progressing     Problem: INFECTION - ADULT  Goal: Absence or prevention of progression during hospitalization  Description: INTERVENTIONS:  - Assess and monitor for signs and symptoms of infection  - Monitor lab/diagnostic results  - Monitor all insertion sites, i.e. indwelling lines, tubes, and drains  - Monitor endotracheal if appropriate and nasal secretions for changes in amount and color  - [place] appropriate cooling/warming therapies per order  - Administer medications as ordered  - Instruct and encourage patient and family to use good hand hygiene technique  - Identify and instruct in appropriate isolation precautions for identified infection/condition  Outcome: Progressing  Goal: Absence of fever/infection during neutropenic period  Description: INTERVENTIONS:  - Monitor WBC  - Perform strict hand hygiene  - Limit to healthy visitors only  - No plants, dried, fresh or silk flowers with burns in patient room  Outcome: Progressing     Problem: SAFETY ADULT  Goal: Patient will remain free of falls  Description: INTERVENTIONS:  - Educate patient/family on patient safety including physical limitations  - Instruct patient to call for assistance with activity   -  Consider consulting OT/PT to assist with strengthening/mobility based on AM PAC & JH-HLM score  - Consult OT/PT to assist with strengthening/mobility   - Keep Call bell within reach  - Keep bed low and locked with side rails adjusted as appropriate  - Keep care items and personal belongings within reach  - Initiate and maintain comfort rounds  - Make Fall Risk Sign visible to staff  - Consider moving patient to room near nurses station  Outcome: Progressing  Goal: Maintain or return to baseline ADL function  Description: INTERVENTIONS:  -  Assess patient's ability to carry out ADLs; assess patient's baseline for ADL function and identify physical deficits which impact ability to perform ADLs (bathing, care of mouth/teeth, toileting, grooming, dressing, etc.)  - Assess/evaluate cause of self-care deficits   - Assess range of motion  - Assess patient's mobility; develop plan if impaired  - Assess patient's need for assistive devices and provide as appropriate  - Encourage maximum independence but intervene and supervise when necessary  - Involve family in performance of ADLs  - Assess for home care needs following discharge   - Consider OT consult to assist with ADL evaluation and planning for discharge  - Provide patient education as appropriate  - Monitor functional capacity and physical performance, use of AM PAC & JH-HLM   - Monitor gait, balance and fatigue with ambulation    Outcome: Progressing  Goal: Maintains/Returns to pre admission functional level  Description: INTERVENTIONS:  - Perform AM-PAC 6 Click Basic Mobility/ Daily Activity assessment daily.  - Set and communicate daily mobility goal to care team and patient/family/caregiver.   - Collaborate with rehabilitation services on mobility goals if consulted  - Out of bed for toileting  - Record patient progress and toleration of activity level   Outcome: Progressing     Problem: DISCHARGE PLANNING  Goal: Discharge to home or other facility with  appropriate resources  Description: INTERVENTIONS:  - Identify barriers to discharge w/patient and caregiver  - Arrange for needed discharge resources and transportation as appropriate  - Identify discharge learning needs (meds, wound care, etc.)  - Arrange for interpretive services to assist at discharge as needed  - Refer to Case Management Department for coordinating discharge planning if the patient needs post-hospital services based on physician/advanced practitioner order or complex needs related to functional status, cognitive ability, or social support system  Outcome: Progressing     Problem: Knowledge Deficit  Goal: Patient/family/caregiver demonstrates understanding of disease process, treatment plan, medications, and discharge instructions  Description: Complete learning assessment and assess knowledge base.  Interventions:  - Provide teaching at level of understanding  - Provide teaching via preferred learning methods  Outcome: Progressing     Problem: SKIN/TISSUE INTEGRITY - ADULT  Goal: Incision(s), wounds(s) or drain site(s) healing without S/S of infection  Description: INTERVENTIONS  - Assess and document dressing, incision, wound bed, drain sites and surrounding tissue  - Provide patient and family education  - Perform skin care/dressing changes every shift    Outcome: Progressing     Problem: MUSCULOSKELETAL - ADULT  Goal: Maintain or return mobility to safest level of function  Description: INTERVENTIONS:  - Assess patient's ability to carry out ADLs; assess patient's baseline for ADL function and identify physical deficits which impact ability to perform ADLs (bathing, care of mouth/teeth, toileting, grooming, dressing, etc.)  - Assess/evaluate cause of self-care deficits   - Assess range of motion  - Assess patient's mobility  - Assess patient's need for assistive devices and provide as appropriate  - Encourage maximum independence but intervene and supervise when necessary  - Involve family  in performance of ADLs  - Assess for home care needs following discharge   - Consider OT consult to assist with ADL evaluation and planning for discharge  - Provide patient education as appropriate  Outcome: Progressing

## 2025-05-15 NOTE — UTILIZATION REVIEW
NOTIFICATION OF OBSERVATION ADMISSION   AUTHORIZATION REQUEST   SERVICING FACILITY:   [place] [address]  Tax ID: [id number]  NPI: [id number]   ATTENDING PROVIDER:  Attending Name and NPI#: Gaudencio Kearns Do [[id number]]  Address: [address] [place] [address]  Phone: [phone]     ADMISSION INFORMATION:  Place of Service: On [facility]  Place of Service Code: 22 CPT Code:   Admitting Diagnosis Code/Description:  Lightheadedness [R42]  Hypotension [I95.9]  Leg laceration [S81.819A]  Observation Admission Date/Time: 05/14/2025 1236  Discharge Date/Time: No discharge date for patient encounter.     UTILIZATION REVIEW CONTACT:  Lorraine Carter Utilization   Network Utilization Review Department  Phone: [phone]  Fax: [phone]  Email: [email or web address]  Contact for approvals/pending authorizations, clinical reviews, and discharge.     PHYSICIAN ADVISORY SERVICES:  Medical Necessity Denial & Oydp-zt-Gvgt Review  Phone: [phone]  Fax: [phone]  Email: [email or web address]     DISCHARGE SUPPORT TEAM:  For Patients Discharge Needs & Updates  Phone: [phone] opt. 2 Fax: [phone]  Email: [email or web address]

## 2025-05-15 NOTE — PLAN OF CARE
Problem: PHYSICAL THERAPY ADULT  Goal: Performs mobility at highest level of function for planned discharge setting.  See evaluation for individualized goals.  Description: Treatment/Interventions: Functional transfer training, LE strengthening/ROM, Therapeutic exercise, Endurance training, Patient/family training, Bed mobility, Gait training, Spoke to nursing, OT, Spoke to case management  Equipment Recommended: Walker (pt has)       See flowsheet documentation for full assessment, interventions and recommendations.  Note: Prognosis: Fair  Problem List: Decreased strength, Decreased endurance, Impaired balance, Decreased mobility, Decreased cognition, Impaired judgement, Decreased safety awareness, Pain  Assessment: Pt. 80 y.o.female presented after a fall at [facility] w/ L leg deep laceration. Pt admitted for Fall w/ leg laceration which required stitches; UTI; SIRS. Pt referred to PT for mobility assessment & D/C planning. Please see above for information re: home set-up & PLOF as well as objective findings during PT assessment. PTA, pt resident of [facility]. Pt reports being I w/ RW however require assistance w/ showers, meals & med management at baseline. On eval, pt functioning below baseline hence will continue skilled PT to improve function & safety. Pt require modAx2 for bed mobility, transfers & amb w/ RW + cues for techniques & safety. Gait deviations as above but no gross LOB noted. Dec amb tolerance 2* to weakness, fatigue & pain. (+) dizziness upon sitting EOB w/ /69. The patient's AM-PAC Basic Mobility Inpatient Short Form Raw Score is 11. A Raw score of less than or equal to 16 suggests the patient may benefit from discharge to post-acute rehabilitation services. Please also refer to the recommendation of the Physical Therapist for safe discharge planning. From PT standpoint, will recommend Level II (moderate resource intensity) rehab services at D/C, pending progress & EDWIN requirements to return.  No SOB reported t/o session. Nsg staff most recent vital signs as follows: /69   Pulse (!) 108   Temp 98.4 °F (36.9 °C)   Resp 16   Ht 5' (1.524 m)   LMP  (LMP Unknown)   SpO2 (!) 89%   BMI 33.84 kg/m² . At end of session, pt OOB in chair in stable condition, call bell & phone in reach, chair alarm activated. Fall precautions reinforced w/ good understanding. CM to follow. Nsg staff to continue to mobilized pt (OOB in chair for all meals & ambulate in room) as tolerated to prevent further decline in function. Will also recommend [facility] for daily amb &/or daily OOB in chair as appropriate. Nsg notified. Co-eval was necessary to complete this PT eval for the pt's best interest given pt's medical acuity & complexity.        Rehab Resource Intensity Level, PT: II (Moderate Resource Intensity) (pending progress & half-way requirements to return)    See flowsheet documentation for full assessment.

## 2025-05-15 NOTE — OCCUPATIONAL THERAPY NOTE
Occupational Therapy Evaluation     Patient Name: Mattie Varela  Today's Date: 5/15/2025  Problem List  Principal Problem:    Fall  Active Problems:    Iron deficiency anemia secondary to inadequate dietary iron intake    Hypothyroidism    Sepsis (HCC)    Chronic obstructive pulmonary disease, unspecified COPD type ([facility])    Abnormal urinalysis    Urinary retention    Chronic diastolic congestive heart failure ([facility])    Hypokalemia    Gastro-esophageal reflux disease without esophagitis    SIRS (systemic inflammatory response syndrome) ([facility])    CKD (chronic kidney disease)    Chronic pain    UTI (urinary tract infection)    Past Medical History  Past Medical History:   Diagnosis Date    Acid reflux     Acute kidney injury ([facility]) 06/18/2022    Anemia     hx of iron-deficient    Anxiety     Arthritis     Asthma     last needed inhaler last year 2020    Basal cell carcinoma     upper lip    Chronic narcotic dependence ([facility])     Chronic pain     Colitis 11/19/2024    Colon polyp     Cystocele     Diverticulosis     Dizziness     at times    Dysfunctional uterine bleeding     last assessed - [id number]    Dysphagia     Fibromyalgia     Gastric ulcer     Gastroparesis     History of colonic polyps     last assessed - [id number]    History of gastroesophageal reflux (GERD)     Hypercholesterolemia     Hyperlipidemia     Hypertension     Hyponatremia 01/13/2024    IBS (irritable bowel syndrome)     Pneumobilia 06/18/2022    Post laminectomy syndrome     S/P insertion of spinal cord stimulator 07/18/2018    s/p Medtronic loop recorder 3/2/2024 03/02/2024    Seasonal allergies     Shortness of breath     exertional    Spinal stenosis     Status post lumbar spinal fusion 03/16/2018    Stroke ([facility])     pt states slight stroke March 2022     Past Surgical History  Past Surgical History:   Procedure Laterality Date    APPENDECTOMY      BACK SURGERY      BREAST CYST EXCISION Left     CARDIAC CATHETERIZATION  11/14/2023     Procedure: Cardiac catheterization;  Surgeon: Randolph Holt MD;  [facility];  Service: Cardiology    CARDIAC CATHETERIZATION N/A 11/14/2023    Procedure: Cardiac Coronary Angiogram;  Surgeon: Randolph Holt MD;  [facility]: [facility];  Service: Cardiology    CARDIAC ELECTROPHYSIOLOGY PROCEDURE N/A 3/2/2024    Procedure: Cardiac loop recorder implant;  Surgeon: Edu Fontaine MD;  Location: [facility] CARD[facility];  Service: Cardiology    CHOLECYSTECTOMY      COLONOSCOPY      ESOPHAGOGASTRODUODENOSCOPY N/A 09/28/2016    Procedure: ESOPHAGOGASTRODUODENOSCOPY (EGD);  Surgeon: Mylene Moeller MD;  Location: [facility] GI LAB;  Service:     HERNIA REPAIR      HYSTERECTOMY      TTAH-BSO age 28    LAMINECTOMY      LUMBAR LAMINECTOMY      OOPHORECTOMY      age 28    CT ARTHRODESIS POSTERIOR/PSTLAT TQ 1NTRSPC THORACIC N/A 06/04/2018    Procedure: Reopening of lumbar incision for T12-L5 posterior instrumented fixation and fusion and T12-L4 posterior decompression;  Surgeon: Wood Rogel MD;  Location: [facility] MAIN OR;  Service: Neurosurgery    CT COLONOSCOPY FLX DX W/COLLJ SPEC WHEN PFRMD N/A 03/02/2016    Procedure: EGD AND COLONOSCOPY;  Surgeon: Mylene Moeller MD;  Location: [facility] GI LAB;  Service: Gastroenterology    CT DILATION ESOPH UNGUIDED SOUND/BOUGIE 1/MULT PASS N/A 09/28/2016    Procedure: DILATATION ESOPHAGEAL;  Surgeon: Mylene Moeller MD;  Location: [facility] GI [facility];  Service: Gastroenterology    CT ESOPHAGOGASTRODUODENOSCOPY TRANSORAL DIAGNOSTIC N/A 07/18/2016    Procedure: ESOPHAGOGASTRODUODENOSCOPY (EGD);  Surgeon: Mylene Moeller MD;  Location: [facility] GI [facility];  Service: Gastroenterology    CT INSJ/RPLCMT SPINAL NPG/RCVR POCKET CRTJ&CONNJ Left 06/04/2018    Procedure: removal of left buttock implantable pulse generator and placement of new  implantable pulse generator;  Surgeon: Wood Rogel MD;  Location: [facility] MA[facility];  Service: Neurosurgery        05/15/25 0933   OT Last Visit   OT Visit Date 05/15/25   Note  "Type   Note type Evaluation   Pain Assessment   Pain Assessment Tool 0-10   Pain Score 5   Pain Location/Orientation Location: Back   Hospital Pain Intervention(s) Repositioned;Ambulation/increased activity;Emotional support   Restrictions/Precautions   Weight Bearing Precautions Per Order No   Other Precautions Cognitive;Chair Alarm;Bed Alarm;Fall Risk;Pain   Home Living   Type of Home Assisted living  ([place])   Home Layout One level;Performs ADLs on one level   Bathroom Shower/Tub Walk-in shower   Bathroom Toilet Raised   Bathroom Equipment Grab bars in shower;Shower chair;Grab bars around toilet   Bathroom Accessibility Accessible via walker   Home Equipment Walker   Prior Function   Level of [place] Independent with ADLs;Needs assistance with ADLs;Independent with functional mobility;Needs assistance with IADLS  ((A) showers.)   Lives With Facility staff   Receives Help From Personal care attendant   IADLs Family/Friend/Other provides transportation;Family/Friend/Other provides meals;Family/Friend/Other provides medication management   Falls in the last 6 months 1 to 4   Vocational Retired   Lifestyle   Autonomy Pt (A) with bathing, (I) with toileting/dressing, (A) with IADLs; ambulates w RW.   Reciprocal Relationships Family   Service to Others retired   General   Additional Pertinent History Comorbidities affecting pt’s functional performance include a significant PMH of: DM2, chronic pain disorder, HTN, hypothyroidism, CKD3, degenerative lumbar spinal stenosis, s/p insertion of spinal cord stimulator, asthma, diarrhea, CVA, anemia, LE edema, COPD, AMS, generalized edema, acute LB pain, SADAF.  Patient with active OT orders and activity orders for Up as tolerated.   Family/Caregiver Present No   Subjective   Subjective \"I'm doing okay\"   ADL   Where Assessed Edge of bed   Eating Assistance 4  Minimal Assistance   Grooming Assistance 4  Minimal Assistance   UB Bathing Assistance 3  Moderate Assistance   LB " Bathing Assistance 2  Maximal Assistance   UB Dressing Assistance 3  Moderate Assistance   LB Dressing Assistance 1  Total Assistance   Toileting Assistance  1  Total Assistance   Bed Mobility   Supine to Sit 3  Moderate assistance   Additional items Assist x 2;HOB elevated;Bedrails;Increased time required;Verbal cues;LE management   Transfers   Sit to Stand 3  Moderate assistance   Additional items Assist x 2;Increased time required;Verbal cues   Stand to Sit 3  Moderate assistance   Additional items Assist x 2;Increased time required;Verbal cues   Additional Comments w/ RW; B/L knee buckling noted during initial standing; require cues for techniques & safety   Functional Mobility   Functional Mobility 3  Moderate assistance   Additional Comments Ax2, limited distance, bed>chair.   Additional items Rolling walker   Balance   Static Sitting   (Fair-/Poor+)   Dynamic Sitting Poor +   Static Standing Poor   Dynamic Standing   (Ax2)   Ambulatory   (Ax2)   Activity Tolerance   Activity Tolerance Patient limited by fatigue;Treatment limited secondary to medical complications (Comment)   Medical Staff Made Aware Cosme PT   Nurse Made Aware Yes   RUE Assessment   RUE Assessment WFL   LUE Assessment   LUE Assessment WFL   Hand Function   Gross Motor Coordination Functional   Fine Motor Coordination Functional   Vision-Basic Assessment   Current Vision No visual deficits   Vision - Complex Assessment   Ocular Range of Motion Intact   Perception   Inattention/Neglect Cues to maintain midline in sitting   Cognition   Overall Cognitive Status Impaired   Arousal/Participation Responsive;Lethargic   Attention Attends with cues to redirect   Orientation Level Oriented X4   Memory Decreased recall of precautions;Decreased short term memory   Following Commands Follows one step commands with increased time or repetition   Comments Lethargic.   Assessment   Limitation Decreased ADL status;Decreased UE ROM;Decreased UE  strength;Decreased Safe judgement during ADL;Decreased endurance;Decreased self-care trans;Decreased high-level ADLs  (dec balance, functional reach, coordination)   Prognosis Good   Assessment Patient is a 80 y.o. year old female seen for OT eval s/p admit to [facility] on 5/14/2025 with fall, SIRS. OT consulted to assess ADLs/IADLs/functional mobility and assist w/ D/C planning. Patient demonstrates the following deficits impacting occupational performance: weakness, decreased strength , decreased balance, decreased activity tolerance, limited functional reach, impaired problem solving, impulsivity, decreased safety awareness, increased pain, dizziness, nausea, increased body habitus , impaired coordination, decreased postural control, decreased cardiovascular endurance, and decreased skin integrity . These impairments, as well at pt’s difficulty performing ADLs, difficulty performing IADLs, difficulty performing transfers/mobility, limited insight into deficits, fall risk , functional decline , increased reliance on DME , and multiple admissions , limit pt’s ability to safely engage in all baseline areas of occupation. Pt's CLOF as follows: eating/grooming: Mynor, UB ADLs: modA, LB ADLs: maxA and dependent, toileting: dependent, bed mobility: modAx2, functional transfers: modAx2, functional mobility: modAx2, sitting/standing tolerance: ~4 min/~5 min. Pt would benefit from continued skilled OT while in acute setting to address deficits as defined above and to maximize (I) w/ ADLs/functional mobility. Occupational performance areas to address include: eating, grooming, bathing/shower, toilet hygiene, dressing, health maintenance, functional mobility, community mobility, and clothing management. Based on the aforementioned evaluation, functional performance deficits, and assessments, pt has been identified as a high complexity evaluation. At this time, recommendation for pt to receive post-acute rehabilitation services at a  Level II (moderate resource intensity) due to above deficits and CLOF. OT will continue to follow pt 3-5x/wk to address the goals listed below to  w/in 10-14 days.   Goals   Patient Goals to feel better   LTG Time Frame 10-14   Plan   Treatment Interventions ADL retraining;Functional transfer training;UE strengthening/ROM;Endurance training;Cognitive reorientation;Patient/family training;Equipment evaluation/education;Compensatory technique education;Continued evaluation;Activityengagement;Energy conservation   Goal Expiration Date 25   OT Treatment Day 0   OT Frequency 3-5x/wk   Discharge Recommendation   Rehab Resource Intensity Level, OT II (Moderate Resource Intensity)   Additional Comments  Co treatment with PT secondary to complex medical condition of pt, possible A of 2 required to achieve and maintain transitional movements, requiring the need of skilled therapeutic intervention of 2 therapists to achieve delivery of services.   AM-PAC Daily Activity Inpatient   Lower Body Dressing 1   Bathing 2   Toileting 1   Upper Body Dressing 2   Grooming 3   Eating 3   Daily Activity Raw Score 12   Daily Activity Standardized Score (Calc for Raw Score >=11) 30.6   AM-PAC Applied Cognition Inpatient   Following a Speech/Presentation 3   Understanding Ordinary Conversation 3   Taking Medications 3   Remembering Where Things Are Placed or Put Away 3   Remembering List of 4-5 Errands 2   Taking Care of Complicated Tasks 2   Applied Cognition Raw Score 16   Applied Cognition Standardized Score 35.03     Occupational Therapy goals: In 7-14 days:     1- Patient will verbalize and demonstrate use of energy conservation/deep breathing technique and work simplification skills during functional activity with no verbal cues.   2- Patient will verbalize and demonstrate good body mechanics and joint protection techniques during ADLs/IADLs with no verbal cues   3- Pt will complete bed mobility at a Mod I level w/ G  balance/safety demonstrated to decrease caregiver assistance required   4- Patient will increase OOB/ sitting tolerance to 2-4 hours per day for increased participation in self care and leisure tasks with no s/s of exertion.   5-Patient will increase standing tolerance time to 5 minutes with unilateral UE support to complete sink level ADLs@ mod I level    6- Pt will improve functional transfers to Mod I on/off all surfaces using DME as needed w/ G balance/safety   7- Patient will complete UB ADLs with Leah utilizing appropriate DME/AE PRN   8- Patient will complete LB ADLs with Leah utilizing appropriate DME/AE PRN   9- Patient will complete toileting tasks with Leah with G hygiene/thoroughness utilizing appropriate DME/AE PRN   10- Pt will improve functional mobility during ADL/IADL/leisure tasks to Mod I using DME as needed w/ G balance/safety    11- Pt will be attentive 100% of the time during ongoing cognitive assessment w/ G participation to assist w/ safe d/c planning/recommendations   12- Pt will participate in simulated IADL management task to increase independence to Mod I w/ G safety and endurance   13- Pt will increase BUE strength by 1MM grade via AROM/AAROM/PROM exercises to increase independence in ADLs and transfers       Teresa Gutierrez OTR/L

## 2025-05-15 NOTE — PLAN OF CARE
Problem: OCCUPATIONAL THERAPY ADULT  Goal: Performs self-care activities at highest level of function for planned discharge setting.  See evaluation for individualized goals.  Description: Treatment Interventions: ADL retraining, Functional transfer training, UE strengthening/ROM, Endurance training, Cognitive reorientation, Patient/family training, Equipment evaluation/education, Compensatory technique education, Continued evaluation, Activityengagement, Energy conservation          See flowsheet documentation for full assessment, interventions and recommendations.   Note: Limitation: Decreased ADL status, Decreased UE ROM, Decreased UE strength, Decreased Safe judgement during ADL, Decreased endurance, Decreased self-care trans, Decreased high-level ADLs (dec balance, functional reach, coordination)  Prognosis: Good  Assessment: Patient is a 80 y.o. year old female seen for OT eval s/p admit to [facility] on 5/14/2025 with fall, SIRS. OT consulted to assess ADLs/IADLs/functional mobility and assist w/ D/C planning. Patient demonstrates the following deficits impacting occupational performance: weakness, decreased strength , decreased balance, decreased activity tolerance, limited functional reach, impaired problem solving, impulsivity, decreased safety awareness, increased pain, dizziness, nausea, increased body habitus , impaired coordination, decreased postural control, decreased cardiovascular endurance, and decreased skin integrity . These impairments, as well at pt’s difficulty performing ADLs, difficulty performing IADLs, difficulty performing transfers/mobility, limited insight into deficits, fall risk , functional decline , increased reliance on DME , and multiple admissions , limit pt’s ability to safely engage in all baseline areas of occupation. Pt's CLOF as follows: eating/grooming: Mynor, UB ADLs: modA, LB ADLs: maxA and dependent, toileting: dependent, bed mobility: modAx2, functional transfers: modAx2,  functional mobility: modAx2, sitting/standing tolerance: ~4 min/~5 min. Pt would benefit from continued skilled OT while in acute setting to address deficits as defined above and to maximize (I) w/ ADLs/functional mobility. Occupational performance areas to address include: eating, grooming, bathing/shower, toilet hygiene, dressing, health maintenance, functional mobility, community mobility, and clothing management. Based on the aforementioned evaluation, functional performance deficits, and assessments, pt has been identified as a high complexity evaluation. At this time, recommendation for pt to receive post-acute rehabilitation services at a Level II (moderate resource intensity) due to above deficits and CLOF. OT will continue to follow pt 3-5x/wk to address the goals listed below to  w/in 10-14 days.     Rehab Resource Intensity Level, OT: II (Moderate Resource Intensity)

## 2025-05-16 LAB
ANION GAP SERPL CALCULATED.3IONS-SCNC: 5 MMOL/L (ref 4–13)
BACTERIA UR CULT: ABNORMAL
BUN SERPL-MCNC: 16 MG/DL (ref 5–25)
CALCIUM SERPL-MCNC: 8.6 MG/DL (ref 8.4–10.2)
CHLORIDE SERPL-SCNC: 95 MMOL/L (ref 96–108)
CO2 SERPL-SCNC: 36 MMOL/L (ref 21–32)
CREAT SERPL-MCNC: 1.13 MG/DL (ref 0.6–1.3)
ERYTHROCYTE [DISTWIDTH] IN BLOOD BY AUTOMATED COUNT: 15.1 % (ref 11.6–15.1)
GFR SERPL CREATININE-BSD FRML MDRD: 45 ML/MIN/1.73SQ M
GLUCOSE SERPL-MCNC: 205 MG/DL (ref 65–140)
HCT VFR BLD AUTO: 23.6 % (ref 34.8–46.1)
HGB BLD-MCNC: 7.4 G/DL (ref 11.5–15.4)
MCH RBC QN AUTO: 27.4 PG (ref 26.8–34.3)
MCHC RBC AUTO-ENTMCNC: 31.4 G/DL (ref 31.4–37.4)
MCV RBC AUTO: 87 FL (ref 82–98)
PLATELET # BLD AUTO: 223 THOUSANDS/UL (ref 149–390)
PMV BLD AUTO: 10.6 FL (ref 8.9–12.7)
POTASSIUM SERPL-SCNC: 3.3 MMOL/L (ref 3.5–5.3)
RBC # BLD AUTO: 2.7 MILLION/UL (ref 3.81–5.12)
SODIUM SERPL-SCNC: 136 MMOL/L (ref 135–147)
WBC # BLD AUTO: 9.61 THOUSAND/UL (ref 4.31–10.16)

## 2025-05-16 PROCEDURE — 97535 SELF CARE MNGMENT TRAINING: CPT

## 2025-05-16 PROCEDURE — 80048 BASIC METABOLIC PNL TOTAL CA: CPT | Performed by: PHYSICIAN ASSISTANT

## 2025-05-16 PROCEDURE — 97530 THERAPEUTIC ACTIVITIES: CPT

## 2025-05-16 PROCEDURE — 99223 1ST HOSP IP/OBS HIGH 75: CPT

## 2025-05-16 PROCEDURE — 99232 SBSQ HOSP IP/OBS MODERATE 35: CPT | Performed by: PHYSICIAN ASSISTANT

## 2025-05-16 PROCEDURE — 85027 COMPLETE CBC AUTOMATED: CPT | Performed by: PHYSICIAN ASSISTANT

## 2025-05-16 RX ORDER — NYSTATIN 100000 [USP'U]/G
POWDER TOPICAL 2 TIMES DAILY
Status: DISCONTINUED | OUTPATIENT
Start: 2025-05-16 | End: 2025-05-19 | Stop reason: HOSPADM

## 2025-05-16 RX ORDER — POTASSIUM CHLORIDE 1500 MG/1
40 TABLET, EXTENDED RELEASE ORAL ONCE
Status: COMPLETED | OUTPATIENT
Start: 2025-05-16 | End: 2025-05-16

## 2025-05-16 RX ORDER — TORSEMIDE 20 MG/1
40 TABLET ORAL DAILY
Status: DISCONTINUED | OUTPATIENT
Start: 2025-05-16 | End: 2025-05-17

## 2025-05-16 RX ADMIN — POTASSIUM CHLORIDE 20 MEQ: 1500 TABLET, EXTENDED RELEASE ORAL at 08:59

## 2025-05-16 RX ADMIN — FAMOTIDINE 20 MG: 20 TABLET, FILM COATED ORAL at 09:00

## 2025-05-16 RX ADMIN — CALCIUM CARBONATE (ANTACID) CHEW TAB 500 MG 500 MG: 500 CHEW TAB at 16:36

## 2025-05-16 RX ADMIN — METHOCARBAMOL 750 MG: 750 TABLET ORAL at 21:15

## 2025-05-16 RX ADMIN — DOCUSATE SODIUM 100 MG: 100 CAPSULE, LIQUID FILLED ORAL at 08:12

## 2025-05-16 RX ADMIN — ONDANSETRON 4 MG: 2 INJECTION INTRAMUSCULAR; INTRAVENOUS at 23:19

## 2025-05-16 RX ADMIN — ATORVASTATIN CALCIUM 40 MG: 80 TABLET, FILM COATED ORAL at 15:42

## 2025-05-16 RX ADMIN — OXYBUTYNIN CHLORIDE 5 MG: 5 TABLET ORAL at 09:00

## 2025-05-16 RX ADMIN — METHOCARBAMOL 750 MG: 750 TABLET ORAL at 06:09

## 2025-05-16 RX ADMIN — DOCUSATE SODIUM 100 MG: 100 CAPSULE, LIQUID FILLED ORAL at 18:02

## 2025-05-16 RX ADMIN — ZOLPIDEM TARTRATE 5 MG: 5 TABLET, FILM COATED ORAL at 21:16

## 2025-05-16 RX ADMIN — LIDOCAINE 5% 1 PATCH: 700 PATCH TOPICAL at 09:00

## 2025-05-16 RX ADMIN — ASPIRIN 81 MG: 81 TABLET, CHEWABLE ORAL at 09:00

## 2025-05-16 RX ADMIN — GABAPENTIN 300 MG: 300 CAPSULE ORAL at 21:15

## 2025-05-16 RX ADMIN — HEPARIN SODIUM 5000 UNITS: 5000 INJECTION INTRAVENOUS; SUBCUTANEOUS at 13:39

## 2025-05-16 RX ADMIN — HEPARIN SODIUM 5000 UNITS: 5000 INJECTION INTRAVENOUS; SUBCUTANEOUS at 21:15

## 2025-05-16 RX ADMIN — TORSEMIDE 40 MG: 20 TABLET ORAL at 09:00

## 2025-05-16 RX ADMIN — MORPHINE SULFATE 15 MG: 15 TABLET ORAL at 18:02

## 2025-05-16 RX ADMIN — MORPHINE SULFATE 15 MG: 15 TABLET ORAL at 08:58

## 2025-05-16 RX ADMIN — MORPHINE SULFATE 15 MG: 15 TABLET ORAL at 12:28

## 2025-05-16 RX ADMIN — NYSTATIN: 100000 POWDER TOPICAL at 18:12

## 2025-05-16 RX ADMIN — CALCIUM CARBONATE (ANTACID) CHEW TAB 500 MG 500 MG: 500 CHEW TAB at 20:33

## 2025-05-16 RX ADMIN — METHOCARBAMOL 750 MG: 750 TABLET ORAL at 13:39

## 2025-05-16 RX ADMIN — METOPROLOL SUCCINATE 12.5 MG: 25 TABLET, EXTENDED RELEASE ORAL at 09:00

## 2025-05-16 RX ADMIN — FERROUS SULFATE TAB 325 MG (65 MG ELEMENTAL FE) 325 MG: 325 (65 FE) TAB at 08:58

## 2025-05-16 RX ADMIN — MORPHINE SULFATE 15 MG: 15 TABLET ORAL at 23:19

## 2025-05-16 RX ADMIN — HEPARIN SODIUM 5000 UNITS: 5000 INJECTION INTRAVENOUS; SUBCUTANEOUS at 06:09

## 2025-05-16 RX ADMIN — OXYBUTYNIN CHLORIDE 5 MG: 5 TABLET ORAL at 21:15

## 2025-05-16 RX ADMIN — LATANOPROST 1 DROP: 50 SOLUTION OPHTHALMIC at 21:16

## 2025-05-16 RX ADMIN — NYSTATIN: 100000 POWDER TOPICAL at 08:59

## 2025-05-16 RX ADMIN — LEVOTHYROXINE SODIUM 37.5 MCG: 75 TABLET ORAL at 06:08

## 2025-05-16 RX ADMIN — CALCIUM CARBONATE (ANTACID) CHEW TAB 500 MG 500 MG: 500 CHEW TAB at 12:28

## 2025-05-16 RX ADMIN — POTASSIUM CHLORIDE 40 MEQ: 1500 TABLET, EXTENDED RELEASE ORAL at 12:28

## 2025-05-16 NOTE — CASE MANAGEMENT
Case Management Discharge Planning Note    Patient name Mattie Varela  Location [facility] /[facility] -* MRN [id number]  : 1945 Date 2025       Current Admission Date: 2025  Current Admission Diagnosis:Fall   Patient Active Problem List    Diagnosis Date Noted    CKD (chronic kidney disease) 05/15/2025    Chronic pain 05/15/2025    UTI (urinary tract infection) 05/15/2025    Fall 2025    SIRS (systemic inflammatory response syndrome) (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) 2025    Multifocal atrial tachycardia ([facility]) 2025    Hyponatremia 2025    Hypokalemia 2024    Gastro-esophageal reflux disease without esophagitis 10/19/2024    SADAF (acute kidney injury) ([facility]) 10/18/2024    Constipation 2024    Chronic diastolic congestive heart failure ([facility]) 2024    s/p Medtronic loop recorder 3/2/2024 2024    Moderate protein-calorie malnutrition ([facility]) 2024    Left ventricular outflow obstruction 2024    Obesity, Class I, BMI 30-34.9 2024    Urinary retention 2023    Non obstructive CAD 11/15/2023    Abnormal urinalysis 2023    Chronic obstructive pulmonary disease, unspecified COPD type ([facility]) 2023    Anemia in stage 3b chronic kidney disease  ([facility]) 2023    Cerebrovascular accident (CVA), unspecified mechanism ([facility]) 2022    Sepsis ([facility]) 2022    Prepyloric ulcer 2021    Mild intermittent asthma without complication 2020    S/P insertion of spinal cord stimulator 2018    Iron deficiency anemia secondary to inadequate dietary iron intake 2018    Type 2 diabetes mellitus with other skin complications ([facility])     Hypothyroidism 2017    Hypercholesterolemia 2016    Anxiety 2015    Other chronic pain 2013    Hypertension 2013      LOS (days): 1  Geometric  Mean LOS (GMLOS) (days): 3.2  Days to GMLOS:2.2     OBJECTIVE:  Risk of Unplanned Readmission Score: 45.57         Current admission status: Inpatient   Preferred Pharmacy:   [facility] - [place], [place] - [address]  [address]  [address]  [facility] [address]  Phone: [phone] Fax: [phone]    [facility] [place], [place] - [address] [address] [facility],  [address],  [address] [place]  [facility] [address]  Phone: [phone] Fax: [phone]    [facility] - [place], [place] - [address]  [address]  [place] [address]  Phone: [phone] Fax: [phone]    Primary Care Provider: Halley Clark MD    Primary Insurance: [employer] [facility]  Secondary Insurance:     DISCHARGE DETAILS:        Additional Comments: PT/OT worked with the pt today. Per OT, pt did much better. Agreeable with pt returning to [facility] with home PT/OT with [facility].  CM called [facility], s/w Director of Wellness, Waleska. Provided update. Waleska is agreeable to pt returning with home PT/OT. [facility] does not accept weekend admissions. Pt will need to return on Monday if cleared. CM called Catrachita with [facility].  Also provided update.  CM will continue to follow for all discharge needs.

## 2025-05-16 NOTE — PLAN OF CARE
Problem: INFECTION - ADULT  Goal: Absence or prevention of progression during hospitalization  Description: INTERVENTIONS:  - Assess and monitor for signs and symptoms of infection  - Monitor lab/diagnostic results  - Monitor all insertion sites, i.e. indwelling lines, tubes, and drains  - Monitor endotracheal if appropriate and nasal secretions for changes in amount and color  - [place] appropriate cooling/warming therapies per order  - Administer medications as ordered  - Instruct and encourage patient and family to use good hand hygiene technique  - Identify and instruct in appropriate isolation precautions for identified infection/condition  Outcome: Progressing  Goal: Absence of fever/infection during neutropenic period  Description: INTERVENTIONS:  - Monitor WBC  - Perform strict hand hygiene  - Limit to healthy visitors only  - No plants, dried, fresh or silk flowers with burns in patient room  Outcome: Progressing     Problem: SAFETY ADULT  Goal: Patient will remain free of falls  Description: INTERVENTIONS:  - Educate patient/family on patient safety including physical limitations  - Instruct patient to call for assistance with activity   - Consider consulting OT/PT to assist with strengthening/mobility based on AM PAC & JH-HLM score  - Consult OT/PT to assist with strengthening/mobility   - Keep Call bell within reach  - Keep bed low and locked with side rails adjusted as appropriate  - Keep care items and personal belongings within reach  - Initiate and maintain comfort rounds  - Make Fall Risk Sign visible to staff  - Consider moving patient to room near nurses station  Outcome: Progressing  Goal: Maintain or return to baseline ADL function  Description: INTERVENTIONS:  -  Assess patient's ability to carry out ADLs; assess patient's baseline for ADL function and identify physical deficits which impact ability to perform ADLs (bathing, care of mouth/teeth, toileting, grooming, dressing, etc.)  -  Assess/evaluate cause of self-care deficits   - Assess range of motion  - Assess patient's mobility; develop plan if impaired  - Assess patient's need for assistive devices and provide as appropriate  - Encourage maximum independence but intervene and supervise when necessary  - Involve family in performance of ADLs  - Assess for home care needs following discharge   - Consider OT consult to assist with ADL evaluation and planning for discharge  - Provide patient education as appropriate  - Monitor functional capacity and physical performance, use of AM PAC & JH-HLM   - Monitor gait, balance and fatigue with ambulation    Outcome: Progressing  Goal: Maintains/Returns to pre admission functional level  Description: INTERVENTIONS:  - Perform AM-PAC 6 Click Basic Mobility/ Daily Activity assessment daily.  - Set and communicate daily mobility goal to care team and patient/family/caregiver.   - Collaborate with rehabilitation services on mobility goals if consulted  - Out of bed for toileting  - Record patient progress and toleration of activity level   Outcome: Progressing     Problem: Knowledge Deficit  Goal: Patient/family/caregiver demonstrates understanding of disease process, treatment plan, medications, and discharge instructions  Description: Complete learning assessment and assess knowledge base.  Interventions:  - Provide teaching at level of understanding  - Provide teaching via preferred learning methods  Outcome: Progressing     Problem: SKIN/TISSUE INTEGRITY - ADULT  Goal: Incision(s), wounds(s) or drain site(s) healing without S/S of infection  Description: INTERVENTIONS  - Assess and document dressing, incision, wound bed, drain sites and surrounding tissue  - Provide patient and family education  - Perform skin care/dressing changes every shift    Outcome: Progressing     Problem: MUSCULOSKELETAL - ADULT  Goal: Maintain or return mobility to safest level of function  Description: INTERVENTIONS:  -  Assess patient's ability to carry out ADLs; assess patient's baseline for ADL function and identify physical deficits which impact ability to perform ADLs (bathing, care of mouth/teeth, toileting, grooming, dressing, etc.)  - Assess/evaluate cause of self-care deficits   - Assess range of motion  - Assess patient's mobility  - Assess patient's need for assistive devices and provide as appropriate  - Encourage maximum independence but intervene and supervise when necessary  - Involve family in performance of ADLs  - Assess for home care needs following discharge   - Consider OT consult to assist with ADL evaluation and planning for discharge  - Provide patient education as appropriate  Outcome: Progressing

## 2025-05-16 NOTE — ASSESSMENT & PLAN NOTE
80-year-old female who presents from [facility] assisted living facility reportedly had a fall  Per patient's report, suspect likely mechanical fall with no preceding symptoms  Had left lower leg laceration sutured by podiatry resident  Denies any loss of consciousness, head trauma  PT and OT to further evaluate as patient does report gait unsteadiness. PT recommending level II rehab services for discharge  At baseline uses rolling walker

## 2025-05-16 NOTE — OCCUPATIONAL THERAPY NOTE
Occupational Therapy Progress Note     Patient Name: Mattie Varela  Today's Date: 5/16/2025  Problem List  Principal Problem:    Fall  Active Problems:    Iron deficiency anemia secondary to inadequate dietary iron intake    Hypothyroidism    Sepsis (HCC)    Chronic obstructive pulmonary disease, unspecified COPD type (HCC)    Abnormal urinalysis    Urinary retention    Chronic diastolic congestive heart failure (HCC)    Hypokalemia    Gastro-esophageal reflux disease without esophagitis    SIRS (systemic inflammatory response syndrome) (HCC)    CKD (chronic kidney disease)    Chronic pain    UTI (urinary tract infection)       05/16/25 1348   OT Last Visit   OT Visit Date 05/16/25   Note Type   Note Type Treatment   Pain Assessment   Pain Assessment Tool 0-10   Pain Score 6   Pain Location/Orientation Location: Back   Hospital Pain Intervention(s) Repositioned;Ambulation/increased activity;Emotional support   Restrictions/Precautions   Weight Bearing Precautions Per Order No   Other Precautions Cognitive;Chair Alarm;Bed Alarm;Fall Risk;Pain   ADL   Where Assessed Chair   Grooming Assistance Other (Comment)  (S-CGA)   Grooming Deficit Supervision/safety;Increased time to complete;Standing with assistive device;Wash/dry hands;Wash/dry face   Grooming Comments standing at sink   LB Dressing Assistance 3  Moderate Assistance   LB Dressing Deficit Requires assistive device for steadying;Supervision/safety;Increased time to complete;Don/doff R sock;Don/doff L sock;Thread RLE into underwear;Thread LLE into underwear   LB Dressing Comments sitting>standing w AD   Transfers   Sit to Stand 4  Minimal assistance   Additional items Assist x 1;Armrests;Increased time required;Verbal cues;Other  (RW)   Stand to Sit 5  Supervision   Additional items Assist x 1;Armrests;Increased time required;Verbal cues;Other  (RW)   Toilet transfer 4  Minimal assistance   Additional items Assist x 1;Increased time required;Verbal  cues;Standard toilet;Other  (gb, rw)   Functional Mobility   Functional Mobility 4  Minimal assistance   Additional Comments Ax1, household distances. 2 standing rest breaks due to fatigue. incr difficulty with turns however no gross LOB noted, increased time. Kyphotic posture.   Additional items Rolling walker   Cognition   Overall Cognitive Status Impaired   Arousal/Participation Alert;Responsive   Attention Attends with cues to redirect   Orientation Level Oriented to person;Oriented to place;Oriented to time   Memory Decreased recall of precautions;Decreased short term memory   Following Commands Follows one step commands with increased time or repetition   Activity Tolerance   Activity Tolerance Patient tolerated treatment well;Patient limited by fatigue;Treatment limited secondary to medical complications (Comment)   Medical Staff Made Aware PT   Assessment   Assessment Pt seen for skilled IP OT session this date per CM request, to re-assess ADLs and functional mobility status for potential return to facility once medically stable. Pt has progressed significantly from evaluation, Ax2>Ax1. She is able to navigate household distances with RW, increased time and VC for technique. Pt able to complete LB ADLs w modA due to dec functional reach, impaired strength and balance. Educ pt on alternating UE for RW support while using contralateral UE to manage clothing. Pt does have some hypotension during session likely contributing to fatigue (sitting- 106/54, standing- 96/53). Will continue to see pt per POC. At this time, recommending pt return to facility with therapy services vs STR pending facility requirements - pt remains motivated to return to facility. Pt left seated in chair, with needs met, call bell within reach and alarm armed.   Plan   Treatment Interventions ADL retraining;Functional transfer training;UE strengthening/ROM;Endurance training;Cognitive reorientation;Patient/family training;Equipment  evaluation/education;Compensatory technique education;Continued evaluation;Activityengagement;Energy conservation   Goal Expiration Date 05/29/25   OT Treatment Day 1   OT Frequency 3-5x/wk   Discharge Recommendation   Rehab Resource Intensity Level, OT II (Moderate Resource Intensity)  (Return to facility with therapy services vs STR pending facility requirements.)   AM-PAC Daily Activity Inpatient   Lower Body Dressing 2   Bathing 2   Toileting 2   Upper Body Dressing 3   Grooming 3   Eating 3   Daily Activity Raw Score 15   Daily Activity Standardized Score (Calc for Raw Score >=11) 34.69   AM-PAC Applied Cognition Inpatient   Following a Speech/Presentation 3   Understanding Ordinary Conversation 4   Taking Medications 3   Remembering Where Things Are Placed or Put Away 3   Remembering List of 4-5 Errands 2   Taking Care of Complicated Tasks 2   Applied Cognition Raw Score 17   Applied Cognition Standardized Score 36.52     Teresa Gutierrez

## 2025-05-16 NOTE — PROGRESS NOTES
"Dr. Kearns updated pt would like a blood transfusion as she \" feels weak\". Response will recheck labs in am and assess.   "

## 2025-05-16 NOTE — PLAN OF CARE
Problem: OCCUPATIONAL THERAPY ADULT  Goal: Performs self-care activities at highest level of function for planned discharge setting.  See evaluation for individualized goals.  Description: Treatment Interventions: ADL retraining, Functional transfer training, UE strengthening/ROM, Endurance training, Cognitive reorientation, Patient/family training, Equipment evaluation/education, Compensatory technique education, Continued evaluation, Activityengagement, Energy conservation          See flowsheet documentation for full assessment, interventions and recommendations.   Note: Limitation: Decreased ADL status, Decreased UE ROM, Decreased UE strength, Decreased Safe judgement during ADL, Decreased endurance, Decreased self-care trans, Decreased high-level ADLs (dec balance, functional reach, coordination)  Prognosis: Good  Assessment: Pt seen for skilled IP OT session this date per CM request, to re-assess ADLs and functional mobility status for potential return to facility once medically stable. Pt has progressed significantly from evaluation, Ax2>Ax1. She is able to navigate household distances with RW, increased time and VC for technique. Pt able to complete LB ADLs w modA due to dec functional reach, impaired strength and balance. Educ pt on alternating UE for RW support while using contralateral UE to manage clothing. Pt does have some hypotension during session likely contributing to fatigue (sitting- 106/54, standing- 96/53). Will continue to see pt per POC. At this time, recommending pt return to facility with therapy services vs STR pending facility requirements - pt remains motivated to return to facility. Pt left seated in chair, with needs met, call bell within reach and alarm armed.     Rehab Resource Intensity Level, OT: II (Moderate Resource Intensity) (Return to facility with therapy services vs STR pending facility requirements.)

## 2025-05-16 NOTE — PLAN OF CARE
Problem: PAIN - ADULT  Goal: Verbalizes/displays adequate comfort level or baseline comfort level  Description: Interventions:  - Encourage patient to monitor pain and request assistance  - Assess pain using appropriate pain scale  - Administer analgesics as ordered based on type and severity of pain and evaluate response  - Implement non-pharmacological measures as appropriate and evaluate response  - Consider cultural and social influences on pain and pain management  - Notify physician/advanced practitioner if interventions unsuccessful or patient reports new pain  - Educate patient/family on pain management process including their role and importance of  reporting pain   - Provide non-pharmacologic/complimentary pain relief interventions  Outcome: Progressing     Problem: INFECTION - ADULT  Goal: Absence or prevention of progression during hospitalization  Description: INTERVENTIONS:  - Assess and monitor for signs and symptoms of infection  - Monitor lab/diagnostic results  - Monitor all insertion sites, i.e. indwelling lines, tubes, and drains  - Monitor endotracheal if appropriate and nasal secretions for changes in amount and color  - [place] appropriate cooling/warming therapies per order  - Administer medications as ordered  - Instruct and encourage patient and family to use good hand hygiene technique  - Identify and instruct in appropriate isolation precautions for identified infection/condition  Outcome: Progressing  Goal: Absence of fever/infection during neutropenic period  Description: INTERVENTIONS:  - Monitor WBC  - Perform strict hand hygiene  - Limit to healthy visitors only  - No plants, dried, fresh or silk flowers with burns in patient room  Outcome: Progressing     Problem: SKIN/TISSUE INTEGRITY - ADULT  Goal: Incision(s), wounds(s) or drain site(s) healing without S/S of infection  Description: INTERVENTIONS  - Assess and document dressing, incision, wound bed, drain sites and surrounding  tissue  - Provide patient and family education  - Perform skin care/dressing changes every shift    Outcome: Progressing     Problem: MUSCULOSKELETAL - ADULT  Goal: Maintain or return mobility to safest level of function  Description: INTERVENTIONS:  - Assess patient's ability to carry out ADLs; assess patient's baseline for ADL function and identify physical deficits which impact ability to perform ADLs (bathing, care of mouth/teeth, toileting, grooming, dressing, etc.)  - Assess/evaluate cause of self-care deficits   - Assess range of motion  - Assess patient's mobility  - Assess patient's need for assistive devices and provide as appropriate  - Encourage maximum independence but intervene and supervise when necessary  - Involve family in performance of ADLs  - Assess for home care needs following discharge   - Consider OT consult to assist with ADL evaluation and planning for discharge  - Provide patient education as appropriate  Outcome: Progressing

## 2025-05-16 NOTE — ASSESSMENT & PLAN NOTE
Wt Readings from Last 3 Encounters:   05/15/25 78.6 kg (173 lb 4.5 oz)   04/23/25 78.6 kg (173 lb 4.5 oz)   04/11/25 79.8 kg (176 lb)     Chest x-ray reports- Low lung volumes with bibasilar atelectasis on the lateral projection   Was on room air but currently on 2L oxygen  Torsemide was held as UA did show hyaline casts indicating mild dehydration  Resume torsemide at 1/2 home dose, 5/16 as creatinine has normalized, 1.13  Patient typically takes 40 mg twice daily, resumed 40 mg daily 5/16, if Cr stable, resume twice daily dosing tomorrow

## 2025-05-16 NOTE — CASE MANAGEMENT
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 (CODE) ([facility]) 2025    Multifocal atrial tachycardia ([facility]) 2025    Hyponatremia 2025    Hypokalemia 2024    Gastro-esophageal reflux disease without esophagitis 10/19/2024    SADAF (acute kidney injury) ([facility]) 10/18/2024    Constipation 2024    Chronic diastolic congestive heart failure ([facility]) 2024    s/p [employer] loop recorder 3/2/2024 2024    Moderate protein-calorie malnutrition ([facility]) 2024    Left ventricular outflow obstruction 2024    Obesity, Class I, BMI 30-34.9 2024    Urinary retention 2023    Non obstructive CAD 11/15/2023    Abnormal urinalysis 2023    Chronic obstructive pulmonary disease, unspecified COPD type ([facility]) 2023    Anemia in stage 3b chronic kidney disease  ([facility]) 2023    Cerebrovascular accident (CVA), unspecified mechanism ([facility]) 2022    Sepsis ([facility]) 2022    Prepyloric ulcer 2021    Mild intermittent asthma without complication 2020    S/P insertion of spinal cord stimulator 2018    Iron deficiency anemia secondary to inadequate dietary iron intake 2018    Type 2 diabetes mellitus with other skin complications ([facility])     Hypothyroidism 2017    Hypercholesterolemia 2016    Anxiety 2015    Other chronic pain 2013    Hypertension 2013      LOS  (days): 1  Geometric Mean LOS (GMLOS) (days): 3.2  Days to GMLOS:2.3     OBJECTIVE:  PATIENT READMITTED TO HOSPITAL  Risk of Unplanned Readmission Score: 44.56         Current admission status: Inpatient       Preferred Pharmacy:   [facility] - [place], [place] - [address]  [email or web address]  [address]  [facility] [address]  Phone: [phone] Fax: [phone]    [facility] [place], [place] - [address]  [facility],  [address]  [facility],  First [address] [facility] [address]  Phone: [phone] Fax: [phone]    [facility] - [place], [place] - [address]  [address]  [place] [address]  Phone: [phone] Fax: [phone]    Primary Care Provider: Halley Clark MD    Primary Insurance: [employer] [facility]  Secondary Insurance:     ASSESSMENT:  Active Health Care Proxies       Viry Venice Health Care Representative - Daughter   Primary Phone: [phone] (Mobile)  Home Phone: [phone]                 Advance Directives  Does patient have a Health Care POA?: Yes  Does patient have Advance Directives?: Yes  Advance Directives: Living will, Power of  for health care  Primary Contact: Venice Baires, maribel         Readmission [facility]  30 Day Readmission: Yes  During your hospital stay, did someone (provider, nurse, ) explain your care to you in a way you could understand?: Yes  Did you feel medically stable to leave the hospital?: Yes  Were you able to pay for your medication at the pharmacy?: Yes  Did you have reliable transportation to take you to your appointments?: Yes  During previous admission, was a post-acute recommendation made?: Yes  What post-acute resources were offered?: [facility]  Patient was readmitted due to: Fall  Action Plan: STR vs return to [facility] with [facility] (PT/OT)    Patient Information  Admitted from:: Facility  Mental Status: Alert  During Assessment patient was accompanied by: Not accompanied during  assessment  Assessment information provided by:: Patient  Primary Caregiver: Self  Support Systems: Self, Daughter, Private Caregivers  County of Residence: [place]  What city do you live in?: [place]  Home entry access options. Select all that apply.: No steps to enter home  Type of Current Residence: Facility ([facility])  Upon entering residence, is there a bedroom on the main floor (no further steps)?: Yes  Upon entering residence, is there a bathroom on the main floor (no further steps)?: Yes  Living Arrangements: Lives in Facility  Is patient a ?: No    Activities of Daily Living Prior to Admission  Functional Status: Assistance  Completes ADLs independently?: No  Level of ADL dependence: Assistance  Ambulates independently?: No  Level of ambulatory dependence: Assistance  Does patient use assisted devices?: Yes  Assisted Devices (DME) used: Wheelchair, Walker  Does patient currently own DME?: Yes  What DME does the patient currently own?: Wheelchair, Walker  Does patient have a history of Outpatient Therapy (PT/OT)?: No  Does the patient have a history of Short-Term Rehab?: Yes  Does patient have a history of HHC?: Yes  Does patient currently have HHC?: Yes ([facility] (PT/OT))    Current Home Health Care  Type of Current Home Care Services: Home PT, Home OT, Nurse visit  Home Health Agency Name:: Other ([facility])  Current Home Health Follow-Up Provider:: PCP    Patient Information Continued  Income Source: Pension/care home  Does patient have prescription coverage?: Yes  Can the patient afford their medications and any related supplies (such as glucometers or test strips)?: Yes  Does patient receive dialysis treatments?: No  Does patient have a history of substance abuse?: No  Does patient have a history of Mental Health Diagnosis?: No         Means of Transportation  Means of Transport to Appts:: Family transport          DISCHARGE DETAILS:    Discharge planning discussed with:: Patient  and Daughter  Freedom of Choice: Yes     CM contacted family/caregiver?: Yes  Were Treatment Team discharge recommendations reviewed with patient/caregiver?: Yes  Did patient/caregiver verbalize understanding of patient care needs?: Yes  Were patient/caregiver advised of the risks associated with not following Treatment Team discharge recommendations?: Yes    Contacts  Patient Contacts: Zackery Valadez  Relationship to Patient:: Family  Contact Method: Phone  Phone Number: [phone]  Reason/Outcome: Emergency Contact, Continuity of Care, Discharge Planning    Requested Home Health Care         Is the patient interested in HHC at discharge?: Yes  Home Health Discipline requested:: Nursing, Physical Therapy, Occupational Therapy  Home Health Agency Name:: Other ([facility])  HHA External Referral Reason (only applicable if external HHA name selected): Patient has established relationship with provider  Home Health Follow-Up Provider:: PCP  Home Health Services Needed:: Gait/ADL Training, Evaluate Functional Status and Safety, Strengthening/Theraputic Exercises to Improve Function  Homebound Criteria Met:: Uses an Assist Device (i.e. cane, walker, etc), Requires the Assistance of Another Person for Safe Ambulation or to Leave the Home  Supporting Clincal Findings:: Limited Endurance, Fatigues Easliy in Short Distances         Additional Comments: CM m/w the pt to complete an assessment and discuss her discharge plan.  Pt is a resident at [facility].  PT/OT recommended STR. Pt is currently refusing STR stating she receives therapy through [facility] 3xs/week.  CM explained that the [facility] would have to be agreeable to her returning knowing PT/OT recommended STR. Pt stated an understanding.  Pt agreeable to this CM calling daughter, Dr. Venice Baires.  CM s/w Dr. Baires, she too prefers pt return to [facility] and continue working with home PT/OT.  CM left a VM message for the  at [facility]  requesting a call back at their earliest convenience.  CM will continue to follow.

## 2025-05-16 NOTE — UTILIZATION REVIEW
NOTIFICATION OF INPATIENT ADMISSION   AUTHORIZATION REQUEST   SERVICING FACILITY:   [place] [address]  Tax ID: [id number]  NPI: [id number] ATTENDING PROVIDER:  Attending Name and NPI#: Gaudencio Kearns Do [[id number]]  Address: [address] [place] [address]  Phone: [phone]     ADMISSION INFORMATION:  Place of Service: Inpatient [facility]  Place of Service Code: 21  Inpatient Admission Date/Time: 5/15/25  2:01 PM  Discharge Date/Time: No discharge date for patient encounter.  Admitting Diagnosis Code/Description:  Lightheadedness [R42]  Hypotension [I95.9]  Leg laceration [S81.819A]     UTILIZATION REVIEW CONTACT:  Lorraine Carter Utilization   Network Utilization Review Department  Phone: [phone]  Fax [phone]  Email: [email or web address]  Contact for approvals/pending authorizations, clinical reviews, and discharge.     PHYSICIAN ADVISORY SERVICES:  Medical Necessity Denial & Jycl-oq-Wkmb Review  Phone: [phone]  Fax: [phone]  Email: [email or web address]     DISCHARGE SUPPORT TEAM:  For Patients Discharge Needs & Updates  Phone: [phone] opt. 2 Fax: [phone]  Email: [email or web address]

## 2025-05-16 NOTE — PROGRESS NOTES
Progress Note - Hospitalist   Name: Mattie Varela 80 y.o. female I MRN: [id number]  Unit/Bed#: E5 -01 I Date of Admission: 5/14/2025   Date of Service: 5/16/2025 I Hospital Day: 1    Assessment & Plan  Fall  80-year-old female who presents from [facility] assisted living facility reportedly had a fall  Per patient's report, suspect likely mechanical fall with no preceding symptoms  Had left lower leg laceration sutured by podiatry resident  Denies any loss of consciousness, head trauma  PT and OT to further evaluate as patient does report gait unsteadiness. PT recommending level II rehab services for discharge  At baseline uses rolling walker  Hypothyroidism  Continue levothyroxine 37.5mcg daily  Chronic obstructive pulmonary disease, unspecified COPD type (HCC)  Albuterol as needed, not in any distress  Abnormal urinalysis  Previous urine cultures growing ESBL Klebsiella, report anaphylaxis to penicillins  Given 1 dose of ertapenem 5/14  Urine culture now reporting-   >100,000 cfu/ml Klebsiella pneumoniae Abnormal    20,000-29,000 cfu/ml Pseudomonas aeruginosa Abnormal    >100,000 cfu/ml Enterococcus faecalis Abnormal    Will continue ertapenem for now pending sensitivities  Transition to oral antibiotics for discharge  Urinary retention  Chronic Bateman catheter  Chronic diastolic congestive heart failure (HCC)  Wt Readings from Last 3 Encounters:   05/15/25 78.6 kg (173 lb 4.5 oz)   04/23/25 78.6 kg (173 lb 4.5 oz)   04/11/25 79.8 kg (176 lb)     Chest x-ray reports- Low lung volumes with bibasilar atelectasis on the lateral projection   Was on room air but currently on 2L oxygen  Torsemide was held as UA did show hyaline casts indicating mild dehydration  Resume torsemide at 1/2 home dose, 5/16 as creatinine has normalized, 1.13  Patient typically takes 40 mg twice daily, resumed 40 mg daily 5/16, if Cr stable, resume twice daily dosing tomorrow  Gastro-esophageal reflux disease without esophagitis  Continue  Pepcid  Complains of heartburn today- added PRN tums  SIRS (systemic inflammatory response syndrome) ([facility])  Met SIRS criteria on admission, with tachycardia, elevated white count  Episode of hypotension that resolved with IV fluids  Suspect white count is likely reactive in setting of fall and laceration  WBC today 8.5  UA concerning for possible infection though she has chronic Ahn catheter  Started on ertapenem, will continue. Follow up on sensitivities  Iron deficiency anemia secondary to inadequate dietary iron intake  Hgb 8.5  Continue iron supplementation  Continue to monitor  Hypokalemia  K 3.3 today, minimally improved  Continue PO supplementation - additional 40mE ordered today  CKD (chronic kidney disease)  Lab Results   Component Value Date    EGFR 45 05/16/2025    EGFR 35 05/15/2025    EGFR 30 05/14/2025    CREATININE 1.13 05/16/2025    CREATININE 1.37 (H) 05/15/2025    CREATININE 1.56 (H) 05/14/2025     Creat today appears to be around baseline  Torsemide was held due to hypovolemia, resumed at once daily dose 5/16- anticipate full dose tomorrow if creatinine remains stable  Chronic pain  With history of chronic pain disorder status post spinal stimulator with degenerative lumbar spinal stenosis   Likely contributing to falls  Home pain med regimen included fentanyl patch, PO morphine, gabapentin, scheduled robaxin  BP runs low at times, likely due to narcotics and decreased PO intake  Will consult geriatrics  Sepsis ([facility])  Sepsis present on admission due to possible UTI a/e/b WBC 14.02,  and lactic acid 2.2 treated with IV Ertapenem, IVF, urine culture, lab monitoring and VS monitoring   UTI (urinary tract infection)  UTI possibly due to indwelling urethral catheter, present on admission a/e/b chronic ahn catheter and urine culture treated with IV IV Ertapenem, UA, urine culture, lab monitoring and VS monitoring         Findings: 5/15 Pharmacy: IV Ertapenem- Indication: urinary tract  infection  5/15 IM- Urinary retention- Chronic Bateman catheter  UA concerning for possible infection though she has chronic Bateman catheter              Urine culture now reporting-               >100,000 cfu/ml Klebsiella pneumoniae Abnormal               20,000-29,000 cfu/ml Pseudomonas aeruginosa Abnormal               >100,000 cfu/ml Enterococcus faecalis Abnormal  Will continue ertapenem for now pending sensitivities    VTE Pharmacologic Prophylaxis:   High Risk (Score >/= 5) - Pharmacological DVT Prophylaxis Ordered: heparin. Sequential Compression Devices Ordered.    Mobility:   Basic Mobility Inpatient Raw Score: 11  JH-HLM Goal: 4: Move to chair/commode  JH-HLM Achieved: 4: Move to chair/commode  JH-HLM Goal achieved. Continue to encourage appropriate mobility.    Patient Centered Rounds: I performed bedside rounds with nursing staff today.   Discussions with Specialists or Other Care Team Provider: Reviewed patient with case management    Education and Discussions with Family / Patient: Attempted to update  (daughter) via phone. Left voicemail.     Current Length of Stay: 1 day(s)  Current Patient Status: Inpatient   Certification Statement: The patient will continue to require additional inpatient hospital stay due to discharge planning  Discharge Plan: Anticipate discharge in 24-48 hrs to assisted living facility    Code Status: Level 1 - Full Code    Subjective   Patient reporting mild constipation, has not had a bowel movement for 2 days but otherwise offers no additional complaints.  Denying shortness of breath today.  She is planning to return to assisted living facility.    Objective :  Temp:  [97.5 °F (36.4 °C)-97.9 °F (36.6 °C)] 97.9 °F (36.6 °C)  HR:  [] 78  BP: (104-120)/(58-69) 104/58  Resp:  [16-17] 16  SpO2:  [89 %-96 %] 96 %  O2 Device: None (Room air)    Body mass index is 33.84 kg/m².     Input and Output Summary (last 24 hours):     Intake/Output Summary (Last 24 hours)  at 5/16/2025 0833  Last data filed at 5/16/2025 0730  Gross per 24 hour   Intake 120 ml   Output 1800 ml   Net -1680 ml       Physical Exam  Vitals and nursing note reviewed.   Constitutional:       General: She is not in acute distress.     Appearance: Normal appearance. She is well-developed.   HENT:      Head: Normocephalic and atraumatic.     Eyes:      General: No scleral icterus.     Conjunctiva/sclera: Conjunctivae normal.       Cardiovascular:      Rate and Rhythm: Normal rate and regular rhythm.      Heart sounds: Murmur heard.   Pulmonary:      Effort: Pulmonary effort is normal.      Breath sounds: No wheezing, rhonchi or rales.   Abdominal:      General: There is no distension.      Palpations: Abdomen is soft.     Skin:     General: Skin is warm and dry.     Neurological:      Mental Status: She is alert. Mental status is at baseline.     Psychiatric:         Mood and Affect: Mood normal.         Lines/Drains:  Lines/Drains/Airways       Active Status       Name Placement date Placement time Site Days    Urethral Catheter Latex 16 Fr. 04/22/25 2027  Latex  23                  Urinary Catheter:  Goal for removal: N/A - Chronic Bateman                 Lab Results: I have reviewed the following results:   Results from last 7 days   Lab Units 05/16/25 0455 05/15/25  0456 05/14/25  0854   WBC Thousand/uL 9.61   < > 14.02*   HEMOGLOBIN g/dL 7.4*   < > 9.2*   HEMATOCRIT % 23.6*   < > 28.5*   PLATELETS Thousands/uL 223   < > 229   SEGS PCT %  --   --  82*   LYMPHO PCT %  --   --  10*   MONO PCT %  --   --  6   EOS PCT %  --   --  1    < > = values in this interval not displayed.     Results from last 7 days   Lab Units 05/16/25  0455 05/15/25  0456 05/14/25  0854   SODIUM mmol/L 136   < > 135   POTASSIUM mmol/L 3.3*   < > 3.2*   CHLORIDE mmol/L 95*   < > 92*   CO2 mmol/L 36*   < > 36*   BUN mg/dL 16   < > 19   CREATININE mg/dL 1.13   < > 1.56*   ANION GAP mmol/L 5   < > 7   CALCIUM mg/dL 8.6   < > 8.8   ALBUMIN  g/dL  --   --  3.2*   TOTAL BILIRUBIN mg/dL  --   --  0.46   ALK PHOS U/L  --   --  116*   ALT U/L  --   --  8   AST U/L  --   --  14   GLUCOSE RANDOM mg/dL 205*   < > 167*    < > = values in this interval not displayed.     Results from last 7 days   Lab Units 05/14/25  0854   INR  1.07             Results from last 7 days   Lab Units 05/14/25  1051 05/14/25  0854   LACTIC ACID mmol/L 1.8 2.2*   PROCALCITONIN ng/ml  --  <0.05       Recent Cultures (last 7 days):   Results from last 7 days   Lab Units 05/14/25  0945   URINE CULTURE  >100,000 cfu/ml Klebsiella pneumoniae*  20,000-29,000 cfu/ml Pseudomonas aeruginosa*  >100,000 cfu/ml Enterococcus faecalis*             Last 24 Hours Medication List:     Current Facility-Administered Medications:     acetaminophen (TYLENOL) tablet 650 mg, Q6H PRN    albuterol (PROVENTIL HFA,VENTOLIN HFA) inhaler 2 puff, Q6H PRN    aspirin chewable tablet 81 mg, Daily    atorvastatin (LIPITOR) tablet 40 mg, Daily With Dinner    calcium carbonate (TUMS) chewable tablet 500 mg, TID PRN    docusate sodium (COLACE) capsule 100 mg, BID    ertapenem (INVanz) 500 mg in sodium chloride 0.9 % 50 mL IVPB, Q24H, Last Rate: Stopped (05/15/25 1932)    famotidine (PEPCID) tablet 20 mg, Daily    fentaNYL (DURAGESIC) 75 mcg/hr TD 72 hr patch 1 patch, Q72H    ferrous sulfate tablet 325 mg, Daily With Breakfast    gabapentin (NEURONTIN) capsule 300 mg, HS    heparin (porcine) subcutaneous injection 5,000 Units, Q8H ARY    latanoprost (XALATAN) 0.005 % ophthalmic solution 1 drop, HS    levothyroxine tablet 37.5 mcg, Early Morning    lidocaine (LIDODERM) 5 % patch 1 patch, Daily    loperamide (IMODIUM) oral liquid 2 mg, TID PRN    methocarbamol (ROBAXIN) tablet 750 mg, Q8H ARY    metoprolol succinate (TOPROL-XL) 24 hr tablet 12.5 mg, Daily    morphine (MSIR) IR tablet 15 mg, Q6H    nystatin (MYCOSTATIN) powder, BID    ondansetron (ZOFRAN) injection 4 mg, Q6H PRN    oxybutynin (DITROPAN) tablet 5 mg,  BID    potassium chloride (Klor-Con M20) CR tablet 20 mEq, Daily    zolpidem (AMBIEN) tablet 5 mg, HS PRN    Administrative Statements   Today, Patient Was Seen By: Catrachita Pathak PA-C      **Please Note: This note may have been constructed using a voice recognition system.**

## 2025-05-16 NOTE — ASSESSMENT & PLAN NOTE
Previous urine cultures growing ESBL Klebsiella, report anaphylaxis to penicillins  Given 1 dose of ertapenem 5/14  Urine culture now reporting-   >100,000 cfu/ml Klebsiella pneumoniae Abnormal    20,000-29,000 cfu/ml Pseudomonas aeruginosa Abnormal    >100,000 cfu/ml Enterococcus faecalis Abnormal    Will continue ertapenem for now pending sensitivities  Transition to oral antibiotics for discharge

## 2025-05-16 NOTE — CONSULTS
Consultation - Geriatrics   Mattie CARLEY Varela 80 y.o. female MRN: [id number]  Unit/Bed#: E5 -01 Encounter: [id number]      Assessment/Plan    Cognitive Screening  Patient has no documented history of memory loss or cognitive impairment  It appears that she does have a history of encephalopathy  Patient notes no issues or difficulties with her memory at baseline   Patient is alert and oriented x 4 on my exam today   She is pleasant, calm, and cooperative   Most recent TSH on 1/20/2025 noted to be 1.428  Most recent vitamin B 12 level on 11/10/2023 noted to be 311  Consider checking on routine labs and supplementing if < 400  CT of the head on 11/15/2024 revealed mild microangiopathic changes   No MoCA or cognitive testing noted in epic  Maintain delirium precautions as discussed below  Redirect and reorient as needed  Keep physically, mentally, and socially active     Delirium Precautions  Baseline mentation: alert and oriented x 4  Current mentation: alert and oriented x 4  Patient is at high risk secondary to age, possible underlying cognitive impairment, history of encephalopathy, UTI, antibiotic use, fall, chronic narcotic use secondary to chronic pain, oxybutynin use, ambien use, vision impairment, and hospitalization   Maintain delirium precautions   Provide redirection, reorientation, and distraction techniques  Maintain fall and safety precautions   Assist with ADLs/IADLs  Avoid deliriogenic medications such as tramadol, benzodiazepines, anticholinergics, benadryl  Treat pain using geriatric pain protocol   Encourage oral hydration and nutrition   Monitor for constipation and urinary retention   Implement sleep hygiene and limit night time interuptions   Maintain sleep-wake cycle   Encourage early and frequent mobilization   Patient had 2 EKGs on 5/14/2025 with QTc intervals of 526 and 531  Would avoid antipsychotics for acute agitation and behaviors   If all other interventions are unsuccessful for acute  agitation and behaviors, can consider Depakote 125 mg once (may use IV Depacon if patient is not taking oral medication)  Would monitor LFTs as patient did have elevated alkaline phosphatase on 5/14  Would avoid benzodiazepines such as Ativan as these can worsen delirium     Deconditioning   Baseline function: independent with ADLs and needs some assistance with IADLs  Patient is at increased risk for deconditioning secondary to possible underlying cognitive impairment, history of encephalopathy, UTI, antibiotic use, fall, chronic narcotic use secondary to chronic pain, oxybutynin use, ambien use, vision impairment, weakness, gait dysfunction, and hospitalization   Continue to optimize diet, hydration, and mobility for healing   Chronic Kidney Disease Stage III  Baseline creatinine appears to be 1.3-1.5  Creatinine on labs today noted to be 1.13  She is on torsemide at baseline but this is currently on hold   Avoid nephrotoxic medication and renal dose medication   Keep hydrated  Chronic Diastolic Congestive Heart Failure  Most recent echo on 11/22/2024 revealed an EF of 51% with low normal systolic function, akinetic to paradoxical septum, and significantly limited endocardial detection  Patient is on torsemide at baseline   This was initially put on hold but has since been restarted   She appears euvolemic on exam today   Consider low sodium diet   Continue to monitor weights and I&O  COPD   Patient does not appear to follow with pulmonology in the outpatient setting   She is on albuterol prn at baseline   She denies shortness of breath on exam today   Continue to monitor respiratory status   Maintain oxygen saturation > 88%  Type II Diabetes   Most recent hemoglobin A1C on 4/23/2025 noted to be 7.4  Patient is not on any medication for diabetes at baseline    Patient is not currently on blood sugar checks   Glucose on labs this morning noted to be elevated at 205  Consider blood sugar checks   Recommend diabetic  diet   Avoid hypoglycemia   Iron Deficiency Anemia   Baseline hemoglobin appears to be 8-10  Hemoglobin on labs today noted to be 7.4  No signs of active bleeding noted on exam today   Continue to monitor CBC   Transfuse for hemoglobin < 7  Monitor for signs and symptoms of infection, dehydration, DVT, and skin breakdown    Frailty   Clinical Frail Scale: 5- Mildly Frail  More evident slowing, needs help high order IADLs (transport, bills, medications)  Progressively impairs shopping and walking outside alone, meal prep and housework  Most recent albumin on 5/14/2025 noted to be 3.2  Consider nutrition consult  Encourage protein supplementation     Ambulatory Dysfunction/Falls  Patient presented to the hospital after sustaining a mechanical fall at home   She was noted to have a laceration treated by podiatry on admission   Patient notes no other falls at home   She ambulates with a roller walker at baseline   PT/OT consulted to assist with strengthening/mobility and assist with discharge planning to appropriate level of care  Assess patient frequently for physical needs, encourage use of assistant devices as needed and directed by PT/OT  Identify cognitive and physical deficits and behaviors that affect risk of falls  Consider moving patient closer to nursing station to monitor more closely for impulsive behavior which may increase risk of falls  [place] fall and safety precautions   Educate patient/family on patient safety including physical limitations and importance of using call bell for assistance   Modify environment to reduce risk of injury including disconnecting from pole when not in use, ensuring adequate lighting in room and restroom, ensuring that path to restroom is clear and free of trip hazards  Out of bed as tolerated    Impaired Vision   Patient has a documented history of glaucoma   Patient does wear eyeglasses  Recommend use of corrective lenses at all appropriate times  Encourage adequate  lighting and encourage use of assistance with ambulation  Keep personal belongings close to avoid reaching  Encourage appropriate footwear at all times  Recommend large font for printed materials provided to patient    Impaired Hearing   Patient denies hearing impairment   She denies the use of hearing aids   Hearing impairment strongly correlated with depression, cognitive impairment, delirium and falls in the older adult  Use hearing aids or sound amplifier  Speak face to face  Use clear dictation and enunciation of words    Dentition/Appetite   Patient wears upper and lower dentures   She notes that her appetite is fair at baseline   Very few meals documented since admission but she did consume 75% of breakfast this morning   Encourage use of dentures at all appropriate times  Ensure meal consistency is appropriate for all abilities   Consider nutrition consult   Continue aspiration precautions     Elimination   Patient has a history of urinary retention with a chronic ahn catheter   Maintain good catheter care   She is on trospium at baseline per review of facility records  She is on oxybutynin here as this is our hospital formulary   Would recommend discontinuation of oxybutynin as this medication has anticholinergic side effects and can cause confusion/delirium in older adults    Patient notes she does have some issues with her bowels   She notes she fluctuates between constipation and diarrhea   She notes she does take medication to help her have a bowel movement   Her home regimen includes   MiraLAX 17 g daily   Senna-S 17.2-100 mg daily   No bowel movement documented since admission   She is at increased risk fo constipation secondary to chronic narcotic use and iron supplementation   Current bowel regimen includes   Colace 100 mg BID   Would restart MiraLAX 17 g daily to current regimen   Monitor for constipation and urinary retention     Insomnia   Patient notes she does not have difficulty sleeping at  bedtime   She notes that she had bene on ambien but believes the facility has stopped administering this   Per review of facility records she is still on this nightly   PDMP reviewed and Ambien was last filled on 4/21/2025  Would recommend titrating off this medication if possible as it has been linked to falls with TBI and hip fractures related to the falls in geriatric patients   Patient is also on melatonin 5 mg daily at bedtime at baseline   Consider restarting especially if patient is not on ambien   Patient notes that she did not sleep well last night   First line is behavioral therapy   Avoid sedative hypnotics including benzodiazepines and benadryl  Encourage staying awake during the day   Encourage daytime activities and morning exercise   Decrease or eliminate daytime naps   Avoid caffeine especially during late afternoon and evening hours  Establish a nighttime routine  Implement sleep hygiene and limit nighttime interruptions    Anxiety/Depression  Patient has a documented history of anxiety   Does not appear that she is on any medication for this at baseline   Mood appears stable on exam today   Continue supportive care     Abnormal Urinalysis   UA on admission suspicious for UTI with moderate blood, small leukocytes, 20-30 RBC, and innumerable WBC and bacteria   Urine culture revealed > 100,000 cFu Klebsiella pneumoniae, 20-29,000 cFu Pseudomonas aeruginosa, and > 100,000 cFu Enterococcus faecalis   She does have a chronic ahn catehter   She remains on ertapenem awaiting sensitivities  Maintain good ahn catheter care   Management per primary team     Chronic Pain   Patient has a history of chronic pain disorder   She is status post spinal stimulator with degenerative lumbar spinal stenossis   Appears she was previously on a fentanyl patch   PDMP reviewed and this has not been filled since March   Per review of facility records her home medication includes  Morphine sulfate 15 mg Q 6 hours    Methocarbamol 750 mg Q 6 hours   Gabapentin 300 mg daily at bedtime   Fentanyl patch 87.5 mcg/hr every 3 days   Savella 100 mg daily   Patient states she has been weaned off the fentanyl patch though this is still listed on her medication list   She states that she was previously on oxycodone but was taken off this medication as this was no longer working   She states that the provider told her they were taking her off of all narcotics and she notes that staff here told her the morphine is a narcotics which she was not aware of  She notes she is followed by [facility] who is managing her medication   Would consider follow-up with a pain specialist   Would recommend attempting to wean down morphine to see if her pain can be controlled on lower dosing or discontinuation    Management per primary team     Home Safety  Patient resides at [facility]  She is independent with ADLs and needs some assistance with some IADLs     Advanced Care Planning  Level 1 Full Code    Home Medication Review   Medication List obtained from Facility Records   Polyethylene glycol 17 g daily   Levothryoxine 37.5 mcg daily  Zolpidem 6.25 mg daily at bedtime  Vitamin D 42927 units monthly  Trospium 20 mg BID   Torsemide 30 mg BID   Senna-S 17.2-100 mg dialy   Savella 100 mg daily   Nystatin powder 100,000 units/gram apply to abdominal folds BID  Morphine Sulfate 15 mg QID   Metoprolol succinate 12.5 mg daily   Metocarbamol 750 mg Q 6 hours  Melatonin 5 mg daily at bedtime  Magnesium oxide 400 mg BID  Lidocaine 4% patch apply topically every morning and evening   Latanoprost 0.05% instill one drop into both eyes at bedtime  Gabapentin 300 mg daily at bedtime  Ferrous Sulfate 325 mg daily   Fentayl patch 87.5 mcg/hr apply one patch every 3 days at bedtime  Atorvastatin 40 mg daily   Aspirin 81 mg daily   Torsemide 20 mg daily as needed for leg swelling  Simethicone 80 mg TID prn   Oxymetazoline 0.05% instill 2 sprays into  each nostril Q 12 hours prn   Ondansetron 4 mg Q 8 hours prn nausea  Guaifenesin 100 mg/5 mL 2 teaspoonful (10 mL) Q 4 hours prn cough nad congestion   Famotidine 20 mg BID prn   Albuterol inhaler 2 puffs Q 4 hours prn   Meclizine 25 mg daily prn dizziness     I have personally reviewed this medication list with the patients pharmacy listed above.       History of Present Illness   Physician Requesting Consult: Gaudencio Kearns DO  Reason for Consult / Principal Problem: Fall and chronic pain   Hx and PE limited by: N/A     HPI: Mattie Varela is a 80 y.o. year old female who has hypothyroidism, hypertension, CHF, CKD, COPD, anemia, and chronic pain and presented to the hospital after sustaining a mechanical fall at her facility. She was reportedly ambulating with her walker and her walker was falling apart. She fell onto her basket that was attached to the walker which resulted in a laceration of the LLE. Podiatry was consulted for the laceration and this was repaired. She did meet SIRS criteria on admission with tachycardia and leukocytosis. This was suspected to be secondary to the fall. Her labs and vitals have since improved. A UA on admission was suspicious for UTI. She remains on IV antibiotics at this time. The patient has a history of chronic pain and is on chronic narcotics. Geriatrics is being consulted for fall and chronic pain.     The patient states she is residing at [facility]. She notes that she is independent with ADLs. She states that she does some light cleaning and straightening up but the facility does the cleaning and cooking. She states her daughter manages her finances and the facility manages her medication. She states she no longer drives. She notes that she has no cognitive deficits at baseline. She states that she has had no other falls prior to the one that prompted this admission. She ambulates with a roller walker at baseline. She notes she does have vision impairment but does  need to go to the eye doctor. She does have eyeglasses for reading. She denies hearing impairment. She notes she wears upper and lower dentures and her appetite is fair at baseline. She notes she does have a chronic catheter. She admits some difficulty with both constipation and diarrhea at times. She notes she does take medication to help her poop. She states she has no difficulty sleeping at baseline. She believes she has been taken off the ambien.     The patient was seen and evaluated today at the bedside for geriatric consult. She is noted to be lying in bed comfortably in no acute distress. She is alert and oriented x 4 on exam today. She is currently denying pain but notes she has a history of chronic back pain. She denies chest pain and shortness of breath. She notes she is eating okay here. She notes that she did not sleep very well last night.     Care was coordinated with patients nurse Preston. She notes no acute issues or events overnight.     Care was also coordinated with Catrachita Pathak PA-C with internal medicine.       Inpatient consult to Gerontology  Consult performed by: NANY Moran  Consult ordered by: Milvia Willis PA-C        Review of Systems   Constitutional:  Positive for activity change. Negative for appetite change and fatigue.   HENT:  Positive for dental problem (upper and lower dentures). Negative for hearing loss.    Eyes:  Positive for visual disturbance (hx of glaucoma (glasses for reading)).   Respiratory:  Negative for cough and shortness of breath.    Cardiovascular:  Negative for chest pain and leg swelling.   Gastrointestinal:  Negative for abdominal distention and abdominal pain.   Genitourinary:         Chronic ahn catheter   Musculoskeletal:  Positive for gait problem. Negative for arthralgias.   Skin:  Negative for color change and pallor.   Neurological:  Positive for weakness. Negative for speech difficulty.   Psychiatric/Behavioral:  Positive for sleep  disturbance. Negative for agitation and confusion. The patient is not nervous/anxious.        Historical Information   Past Medical History:   Diagnosis Date    Acid reflux     Acute kidney injury (HCC) 06/18/2022    Anemia     hx of iron-deficient    Anxiety     Arthritis     Asthma     last needed inhaler last year 2020    Basal cell carcinoma     upper lip    Chronic narcotic dependence (HCC)     Chronic pain     Colitis 11/19/2024    Colon polyp     Cystocele     Diverticulosis     Dizziness     at times    Dysfunctional uterine bleeding     last assessed - [id number]    Dysphagia     Fibromyalgia     Gastric ulcer     Gastroparesis     History of colonic polyps     last assessed - [id number]    History of gastroesophageal reflux (GERD)     Hypercholesterolemia     Hyperlipidemia     Hypertension     Hyponatremia 01/13/2024    IBS (irritable bowel syndrome)     Pneumobilia 06/18/2022    Post laminectomy syndrome     S/P insertion of spinal cord stimulator 07/18/2018    s/p [employer] loop recorder 3/2/2024 03/02/2024    Seasonal allergies     Shortness of breath     exertional    Spinal stenosis     Status post lumbar spinal fusion 03/16/2018    Stroke ([facility])     pt states slight stroke March 2022     Past Surgical History:   Procedure Laterality Date    APPENDECTOMY      BACK SURGERY      BREAST CYST EXCISION Left     CARDIAC CATHETERIZATION  11/14/2023    Procedure: Cardiac catheterization;  Surgeon: Randolph Holt MD;  [facility];  Service: Cardiology    CARDIAC CATHETERIZATION N/A 11/14/2023    Procedure: Cardiac Coronary Angiogram;  Surgeon: Randolph Holt MD;  [facility];  Service: Cardiology    CARDIAC ELECTROPHYSIOLOGY PROCEDURE N/A 3/2/2024    Procedure: Cardiac loop recorder implant;  Surgeon: Edu Fontaine MD;  [facility];  Service: Cardiology    CHOLECYSTECTOMY      COLONOSCOPY      ESOPHAGOGASTRODUODENOSCOPY N/A 09/28/2016    Procedure:  ESOPHAGOGASTRODUODENOSCOPY (EGD);  Surgeon: Mylene Moeller MD;  Location: [facility];  Service:     HERNIA REPAIR      HYSTERECTOMY      TTAH-BSO age 28    LAMINECTOMY      LUMBAR LAMINECTOMY      OOPHORECTOMY      age 28    TN ARTHRODESIS POSTERIOR/PSTLAT TQ 1NTRSPC THORACIC N/A 06/04/2018    Procedure: Reopening of lumbar incision for T12-L5 posterior instrumented fixation and fusion and T12-L4 posterior decompression;  Surgeon: Wood Rogel MD;  Location: [facility];  Service: Neurosurgery    TN COLONOSCOPY FLX DX W/COLLJ SPEC WHEN PFRMD N/A 03/02/2016    Procedure: EGD AND COLONOSCOPY;  Surgeon: Mylene Moeller MD;  [facility]: [facility];  Service: Gastroenterology    TN DILATION ESOPH UNGUIDED SOUND/BOUGIE 1/MULT PASS N/A 09/28/2016    Procedure: DILATATION ESOPHAGEAL;  Surgeon: Mylene Moeller MD;  [facility]: [facility];  Service: Gastroenterology    TN ESOPHAGOGASTRODUODENOSCOPY TRANSORAL DIAGNOSTIC N/A 07/18/2016    Procedure: ESOPHAGOGASTRODUODENOSCOPY (EGD);  Surgeon: Mylene Moeller MD;  [facility]: [facility];  Service: Gastroenterology    TN INSJ/RPLCMT SPINAL NPG/RCVR POCKET CRTJ&CONNJ Left 06/04/2018    Procedure: removal of left buttock implantable pulse generator and placement of new  implantable pulse generator;  Surgeon: Wood Rogel MD;  [facility];  Service: Neurosurgery     Social History   Social History     Substance and Sexual Activity   Alcohol Use Not Currently    Comment: Denied history of alcohol use     Social History     Substance and Sexual Activity   Drug Use No    Comment: Denied history of drug use     Tobacco Use History[1]      Family History:   Family History   Problem Relation Age of Onset    Lung cancer Mother 46    Pulmonary embolism Father     No Known Problems Sister     No Known Problems Daughter     No Known Problems Daughter     Stroke Maternal Grandmother     Heart attack Maternal Grandfather     No Known Problems Paternal Grandmother     No Known Problems Paternal  Grandfather     No Known Problems Maternal Aunt     Diabetes Family         Diabetes mellitus    Hypertension Family     Stroke Family         Stroke complications       Allergies[2]    Objective   Vitals: Blood pressure 113/68, pulse 90, temperature 97.9 °F (36.6 °C), temperature source Oral, resp. rate 17, height 5' (1.524 m), SpO2 92%, not currently breastfeeding.,Body mass index is 33.84 kg/m².      Physical Exam  Vitals and nursing note reviewed.   Constitutional:       General: She is not in acute distress.     Appearance: She is not ill-appearing.   HENT:      Head: Normocephalic.      Mouth/Throat:      Mouth: Mucous membranes are moist.     Eyes:      General: No scleral icterus.     Conjunctiva/sclera: Conjunctivae normal.       Cardiovascular:      Rate and Rhythm: Normal rate and regular rhythm.   Pulmonary:      Effort: Pulmonary effort is normal. No respiratory distress.   Abdominal:      General: There is no distension.      Palpations: Abdomen is soft.      Tenderness: There is no abdominal tenderness.     Musculoskeletal:         General: No swelling or tenderness.     Skin:     General: Skin is warm and dry.     Neurological:      General: No focal deficit present.      Mental Status: She is alert and oriented to person, place, and time. Mental status is at baseline.      Motor: Weakness present.      Gait: Gait abnormal.         Lab Results:   Results from last 7 days   Lab Units 05/15/25  0456   WBC Thousand/uL 8.12   HEMOGLOBIN g/dL 8.5*   HEMATOCRIT % 26.5*   PLATELETS Thousands/uL 222        Results from last 7 days   Lab Units 05/15/25  0456 05/14/25  0854   POTASSIUM mmol/L 3.4* 3.2*   CHLORIDE mmol/L 91* 92*   CO2 mmol/L 38* 36*   BUN mg/dL 20 19   CREATININE mg/dL 1.37* 1.56*   CALCIUM mg/dL 8.6 8.8   ALK PHOS U/L  --  116*   ALT U/L  --  8   AST U/L  --  14       Imaging Studies: Results Review Statement: I reviewed radiology reports from this admission including: chest xray.  EKG,  "Pathology, and Other Studies: Results Review Statement: I reviewed AM labs and EKG.   VTE Prophylaxis: VTE covered by:  heparin (porcine), Subcutaneous, 5,000 Units at 25 [id number]       Code Status: Level 1 - Full Code      I have spent a total time of 90 minutes in caring for this patient on the day of the visit/encounter including Patient and family education, Risk factor reductions, Counseling / Coordination of care, Documenting in the medical record, Reviewing/placing orders in the medical record (including tests, medications, and/or procedures), Obtaining or reviewing history  , and Communicating with other healthcare professionals .      Please note:  Voice-recognition software may have been used in the preparation of this document.  Occasional wrong word or \"sound-alike\" substitutions may have occurred due to the inherent limitations of voice recognition software.  Interpretation should be guided by context.         [1]   Social History  Tobacco Use   Smoking Status Former    Current packs/day: 0.00    Types: Cigarettes    Quit date: 1970    Years since quittin.4   Smokeless Tobacco Never   Tobacco Comments    Denied history of current ever day smoker, Former smoker and Never smoker all documented in [employer]   [2]   Allergies  Allergen Reactions    Penicillins Anaphylaxis, Hives and Other (See Comments)     Other reaction(s): Unknown Reaction    Sulfa Antibiotics Anaphylaxis and Other (See Comments)     Other reaction(s): Unknown Reaction    Aspartame - Food Allergy Other (See Comments) and Hypertension     Slurred speech, weakness, stroke sx    Iodinated Contrast Media Hives     Pt has taken prep prior for contrast and has not had any break through reaction    Keflex [Cephalexin] Hives     "

## 2025-05-16 NOTE — ASSESSMENT & PLAN NOTE
Lab Results   Component Value Date    EGFR 45 05/16/2025    EGFR 35 05/15/2025    EGFR 30 05/14/2025    CREATININE 1.13 05/16/2025    CREATININE 1.37 (H) 05/15/2025    CREATININE 1.56 (H) 05/14/2025     Creat today appears to be around baseline  Torsemide was held due to hypovolemia, resumed at once daily dose 5/16- anticipate full dose tomorrow if creatinine remains stable

## 2025-05-16 NOTE — RESTORATIVE TECHNICIAN NOTE
Restorative Technician Note      Patient Name: Mattie Sureshcoopergisell     Restorative Tech Visit Date: 05/16/25  Note Type: Mobility  Patient Position Upon Consult: Supine  Activity Performed: Ambulated  Assistive Device: Roller walker  Patient Position at End of Consult: Bedside chair; All needs within reach; Bed/Chair alarm activated            ns

## 2025-05-17 ENCOUNTER — APPOINTMENT (INPATIENT)
Dept: RADIOLOGY | Facility: HOSPITAL | Age: 80
DRG: 987 | End: 2025-05-17
Payer: MEDICARE

## 2025-05-17 PROBLEM — R82.71 BACTERIURIA: Status: ACTIVE | Noted: 2025-05-15

## 2025-05-17 LAB
ABO GROUP BLD: NORMAL
ANION GAP SERPL CALCULATED.3IONS-SCNC: 5 MMOL/L (ref 4–13)
BASOPHILS # BLD AUTO: 0.03 THOUSANDS/ÂΜL (ref 0–0.1)
BASOPHILS NFR BLD AUTO: 0 % (ref 0–1)
BLD GP AB SCN SERPL QL: NEGATIVE
BUN SERPL-MCNC: 15 MG/DL (ref 5–25)
CALCIUM SERPL-MCNC: 8.5 MG/DL (ref 8.4–10.2)
CHLORIDE SERPL-SCNC: 97 MMOL/L (ref 96–108)
CO2 SERPL-SCNC: 33 MMOL/L (ref 21–32)
CREAT SERPL-MCNC: 1.19 MG/DL (ref 0.6–1.3)
EOSINOPHIL # BLD AUTO: 0.33 THOUSAND/ÂΜL (ref 0–0.61)
EOSINOPHIL NFR BLD AUTO: 3 % (ref 0–6)
ERYTHROCYTE [DISTWIDTH] IN BLOOD BY AUTOMATED COUNT: 15.1 % (ref 11.6–15.1)
FERRITIN SERPL-MCNC: 35 NG/ML (ref 30–307)
GFR SERPL CREATININE-BSD FRML MDRD: 42 ML/MIN/1.73SQ M
GLUCOSE SERPL-MCNC: 168 MG/DL (ref 65–140)
HCT VFR BLD AUTO: 22 % (ref 34.8–46.1)
HGB BLD-MCNC: 6.9 G/DL (ref 11.5–15.4)
IMM GRANULOCYTES # BLD AUTO: 0.05 THOUSAND/UL (ref 0–0.2)
IMM GRANULOCYTES NFR BLD AUTO: 1 % (ref 0–2)
IRON SATN MFR SERPL: 15 % (ref 15–50)
IRON SERPL-MCNC: 37 UG/DL (ref 50–212)
LYMPHOCYTES # BLD AUTO: 2.32 THOUSANDS/ÂΜL (ref 0.6–4.47)
LYMPHOCYTES NFR BLD AUTO: 24 % (ref 14–44)
MCH RBC QN AUTO: 27.8 PG (ref 26.8–34.3)
MCHC RBC AUTO-ENTMCNC: 31.4 G/DL (ref 31.4–37.4)
MCV RBC AUTO: 89 FL (ref 82–98)
MONOCYTES # BLD AUTO: 0.92 THOUSAND/ÂΜL (ref 0.17–1.22)
MONOCYTES NFR BLD AUTO: 10 % (ref 4–12)
NEUTROPHILS # BLD AUTO: 5.93 THOUSANDS/ÂΜL (ref 1.85–7.62)
NEUTS SEG NFR BLD AUTO: 62 % (ref 43–75)
NRBC BLD AUTO-RTO: 0 /100 WBCS
PLATELET # BLD AUTO: 223 THOUSANDS/UL (ref 149–390)
PMV BLD AUTO: 10.7 FL (ref 8.9–12.7)
POTASSIUM SERPL-SCNC: 4.5 MMOL/L (ref 3.5–5.3)
RBC # BLD AUTO: 2.48 MILLION/UL (ref 3.81–5.12)
RH BLD: POSITIVE
SODIUM SERPL-SCNC: 135 MMOL/L (ref 135–147)
SPECIMEN EXPIRATION DATE: NORMAL
TIBC SERPL-MCNC: 249.2 UG/DL (ref 250–450)
TRANSFERRIN SERPL-MCNC: 178 MG/DL (ref 203–362)
UIBC SERPL-MCNC: 212 UG/DL (ref 155–355)
WBC # BLD AUTO: 9.58 THOUSAND/UL (ref 4.31–10.16)

## 2025-05-17 PROCEDURE — 97530 THERAPEUTIC ACTIVITIES: CPT

## 2025-05-17 PROCEDURE — 97116 GAIT TRAINING THERAPY: CPT

## 2025-05-17 PROCEDURE — P9016 RBC LEUKOCYTES REDUCED: HCPCS

## 2025-05-17 PROCEDURE — 86850 RBC ANTIBODY SCREEN: CPT | Performed by: INTERNAL MEDICINE

## 2025-05-17 PROCEDURE — 85025 COMPLETE CBC W/AUTO DIFF WBC: CPT | Performed by: PHYSICIAN ASSISTANT

## 2025-05-17 PROCEDURE — 30233N1 TRANSFUSION OF NONAUTOLOGOUS RED BLOOD CELLS INTO PERIPHERAL VEIN, PERCUTANEOUS APPROACH: ICD-10-PCS | Performed by: INTERNAL MEDICINE

## 2025-05-17 PROCEDURE — 72100 X-RAY EXAM L-S SPINE 2/3 VWS: CPT

## 2025-05-17 PROCEDURE — 80048 BASIC METABOLIC PNL TOTAL CA: CPT | Performed by: PHYSICIAN ASSISTANT

## 2025-05-17 PROCEDURE — 83540 ASSAY OF IRON: CPT | Performed by: PHYSICIAN ASSISTANT

## 2025-05-17 PROCEDURE — 83550 IRON BINDING TEST: CPT | Performed by: PHYSICIAN ASSISTANT

## 2025-05-17 PROCEDURE — 82728 ASSAY OF FERRITIN: CPT | Performed by: PHYSICIAN ASSISTANT

## 2025-05-17 PROCEDURE — 97110 THERAPEUTIC EXERCISES: CPT

## 2025-05-17 PROCEDURE — 86901 BLOOD TYPING SEROLOGIC RH(D): CPT | Performed by: INTERNAL MEDICINE

## 2025-05-17 PROCEDURE — 99232 SBSQ HOSP IP/OBS MODERATE 35: CPT | Performed by: PHYSICIAN ASSISTANT

## 2025-05-17 PROCEDURE — 86900 BLOOD TYPING SEROLOGIC ABO: CPT | Performed by: INTERNAL MEDICINE

## 2025-05-17 RX ORDER — FAMOTIDINE 20 MG/1
20 TABLET, FILM COATED ORAL DAILY
Status: DISCONTINUED | OUTPATIENT
Start: 2025-05-17 | End: 2025-05-19 | Stop reason: HOSPADM

## 2025-05-17 RX ORDER — TORSEMIDE 20 MG/1
40 TABLET ORAL 2 TIMES DAILY
Status: DISCONTINUED | OUTPATIENT
Start: 2025-05-17 | End: 2025-05-19 | Stop reason: HOSPADM

## 2025-05-17 RX ORDER — FAMOTIDINE 20 MG/1
20 TABLET, FILM COATED ORAL 2 TIMES DAILY
Status: DISCONTINUED | OUTPATIENT
Start: 2025-05-17 | End: 2025-05-17

## 2025-05-17 RX ADMIN — HEPARIN SODIUM 5000 UNITS: 5000 INJECTION INTRAVENOUS; SUBCUTANEOUS at 21:59

## 2025-05-17 RX ADMIN — HEPARIN SODIUM 5000 UNITS: 5000 INJECTION INTRAVENOUS; SUBCUTANEOUS at 06:28

## 2025-05-17 RX ADMIN — MORPHINE SULFATE 15 MG: 15 TABLET ORAL at 12:36

## 2025-05-17 RX ADMIN — OXYBUTYNIN CHLORIDE 5 MG: 5 TABLET ORAL at 09:30

## 2025-05-17 RX ADMIN — MORPHINE SULFATE 15 MG: 15 TABLET ORAL at 17:46

## 2025-05-17 RX ADMIN — MORPHINE SULFATE 15 MG: 15 TABLET ORAL at 06:28

## 2025-05-17 RX ADMIN — DOCUSATE SODIUM 100 MG: 100 CAPSULE, LIQUID FILLED ORAL at 17:46

## 2025-05-17 RX ADMIN — LEVOTHYROXINE SODIUM 37.5 MCG: 75 TABLET ORAL at 06:28

## 2025-05-17 RX ADMIN — NYSTATIN 1 APPLICATION: 100000 POWDER TOPICAL at 17:47

## 2025-05-17 RX ADMIN — FERROUS SULFATE TAB 325 MG (65 MG ELEMENTAL FE) 325 MG: 325 (65 FE) TAB at 09:30

## 2025-05-17 RX ADMIN — METHOCARBAMOL 750 MG: 750 TABLET ORAL at 06:28

## 2025-05-17 RX ADMIN — POTASSIUM CHLORIDE 20 MEQ: 1500 TABLET, EXTENDED RELEASE ORAL at 09:30

## 2025-05-17 RX ADMIN — OXYBUTYNIN CHLORIDE 5 MG: 5 TABLET ORAL at 21:59

## 2025-05-17 RX ADMIN — METHOCARBAMOL 750 MG: 750 TABLET ORAL at 21:59

## 2025-05-17 RX ADMIN — DOCUSATE SODIUM 100 MG: 100 CAPSULE, LIQUID FILLED ORAL at 09:30

## 2025-05-17 RX ADMIN — ASPIRIN 81 MG: 81 TABLET, CHEWABLE ORAL at 09:29

## 2025-05-17 RX ADMIN — GABAPENTIN 300 MG: 300 CAPSULE ORAL at 21:59

## 2025-05-17 RX ADMIN — METOPROLOL SUCCINATE 12.5 MG: 25 TABLET, EXTENDED RELEASE ORAL at 09:29

## 2025-05-17 RX ADMIN — HEPARIN SODIUM 5000 UNITS: 5000 INJECTION INTRAVENOUS; SUBCUTANEOUS at 13:34

## 2025-05-17 RX ADMIN — LIDOCAINE 5% 1 PATCH: 700 PATCH TOPICAL at 09:30

## 2025-05-17 RX ADMIN — LATANOPROST 1 DROP: 50 SOLUTION OPHTHALMIC at 22:04

## 2025-05-17 RX ADMIN — FAMOTIDINE 20 MG: 20 TABLET, FILM COATED ORAL at 01:43

## 2025-05-17 RX ADMIN — TORSEMIDE 40 MG: 20 TABLET ORAL at 09:29

## 2025-05-17 RX ADMIN — ATORVASTATIN CALCIUM 40 MG: 80 TABLET, FILM COATED ORAL at 15:42

## 2025-05-17 RX ADMIN — ZOLPIDEM TARTRATE 5 MG: 5 TABLET, FILM COATED ORAL at 22:11

## 2025-05-17 RX ADMIN — FAMOTIDINE 20 MG: 20 TABLET, FILM COATED ORAL at 09:30

## 2025-05-17 RX ADMIN — NYSTATIN: 100000 POWDER TOPICAL at 09:30

## 2025-05-17 RX ADMIN — METHOCARBAMOL 750 MG: 750 TABLET ORAL at 13:34

## 2025-05-17 NOTE — ASSESSMENT & PLAN NOTE
Wt Readings from Last 3 Encounters:   05/15/25 78.6 kg (173 lb 4.5 oz)   04/23/25 78.6 kg (173 lb 4.5 oz)   04/11/25 79.8 kg (176 lb)     Chest x-ray reports- Low lung volumes with bibasilar atelectasis on the lateral projection   Was on room air but currently on 2L oxygen  Torsemide was held as UA did show hyaline casts indicating mild dehydration  Resumed on torsemide 40 mg BID

## 2025-05-17 NOTE — PROGRESS NOTES
Progress Note - Hospitalist   Name: Mattie Varela 80 y.o. female I MRN: [id number]  Unit/Bed#: E5 -01 I Date of Admission: 5/14/2025   Date of Service: 5/17/2025 I Hospital Day: 2    Assessment & Plan  Fall  80-year-old female who presents from [facility] assisted living facility reportedly had a fall  Per patient's report, suspect likely mechanical fall with no preceding symptoms  Had left lower leg laceration sutured by podiatry resident  Denies any loss of consciousness, head trauma  PT and OT to further evaluate as patient does report gait unsteadiness. PT recommending level II rehab services for discharge  At baseline uses rolling walker  Hypothyroidism  Continue levothyroxine 37.5mcg daily  Chronic obstructive pulmonary disease, unspecified COPD type (HCC)  Albuterol as needed, not in any distress  Abnormal urinalysis  Previous urine cultures growing ESBL Klebsiella, report anaphylaxis to penicillins  Given 1 dose of ertapenem 5/14  Urine culture now reporting-   >100,000 cfu/ml Klebsiella pneumoniae Abnormal    20,000-29,000 cfu/ml Pseudomonas aeruginosa Abnormal    >100,000 cfu/ml Enterococcus faecalis Abnormal    Will continue ertapenem for now pending sensitivities  Transition to oral antibiotics for discharge  Urinary retention  Chronic Bateman catheter  Chronic diastolic congestive heart failure (HCC)  Wt Readings from Last 3 Encounters:   05/15/25 78.6 kg (173 lb 4.5 oz)   04/23/25 78.6 kg (173 lb 4.5 oz)   04/11/25 79.8 kg (176 lb)     Chest x-ray reports- Low lung volumes with bibasilar atelectasis on the lateral projection   Was on room air but currently on 2L oxygen  Torsemide was held as UA did show hyaline casts indicating mild dehydration  Resumed on torsemide 40 mg BID  Gastro-esophageal reflux disease without esophagitis  Continue Pepcid  Complains of heartburn today- added PRN tums  SIRS (systemic inflammatory response syndrome) (HCC)  Met SIRS criteria on admission, with tachycardia,  elevated white count  Episode of hypotension that resolved with IV fluids  Suspect white count is likely reactive in setting of fall and laceration  WBC today 8.5  Suspected urine colonization, monitoring off antibiotics  Iron deficiency anemia secondary to inadequate dietary iron intake  Hgb 8.5  Continue iron supplementation  Receiving 1 unit PRBC  Hypokalemia  K 3.3 today, minimally improved  Continue PO supplementation - additional 40mE ordered today  CKD (chronic kidney disease)  Lab Results   Component Value Date    EGFR 42 05/17/2025    EGFR 45 05/16/2025    EGFR 35 05/15/2025    CREATININE 1.19 05/17/2025    CREATININE 1.13 05/16/2025    CREATININE 1.37 (H) 05/15/2025     Creat today appears to be around baseline  Torsemide resumed 40 mg twice daily  Chronic pain  With history of chronic pain disorder status post spinal stimulator with degenerative lumbar spinal stenosis   Likely contributing to falls  Home pain med regimen included fentanyl patch, PO morphine, gabapentin, scheduled robaxin  BP runs low at times, likely due to narcotics and decreased PO intake  Geriatrics following  Sepsis (HCC)  Sepsis present on admission due to possible UTI a/e/b WBC 14.02,  and lactic acid 2.2 treated with IV Ertapenem, IVF, urine culture, lab monitoring and VS monitoring   Bacteriuria  UTI possibly due to indwelling urethral catheter, present on admission a/e/b chronic ahn catheter and urine culture treated with IV IV Ertapenem, UA, urine culture, lab monitoring and VS monitoring   Urine culture now reporting-               >100,000 cfu/ml Klebsiella pneumoniae Abnormal               20,000-29,000 cfu/ml Pseudomonas aeruginosa Abnormal               >100,000 cfu/ml Enterococcus faecalis Abnormal  Suspect colonization, monitoring off antibiotics    VTE Pharmacologic Prophylaxis:   Moderate Risk (Score 3-4) - Pharmacological DVT Prophylaxis Ordered: heparin.    Mobility:   Basic Mobility Inpatient Raw Score:  11  JH-HLM Goal: 4: Move to chair/commode  JH-HLM Achieved: 7: Walk 25 feet or more  JH-HLM Goal achieved. Continue to encourage appropriate mobility.    Patient Centered Rounds: I performed bedside rounds with nursing staff today.   Discussions with Specialists or Other Care Team Provider: None    Education and Discussions with Family / Patient: Updated  (daughter) via phone.    Current Length of Stay: 2 day(s)  Current Patient Status: Inpatient   Certification Statement: The patient will continue to require additional inpatient hospital stay due to anemia requiring 1 unit PRBC transfusion  Discharge Plan: Anticipate discharge in 24-48 hrs to prior assisted or independent living facility.    Code Status: Level 1 - Full Code    Subjective   Patient examined at bedside, no acute events overnight.  She does report worsening lethargy, fatigue, weakness today.  Notably, hemoglobin of 6.9.  Signed consent for 1 unit PRBC transfusion    Objective :  Temp:  [97.8 °F (36.6 °C)-99.7 °F (37.6 °C)] 99 °F (37.2 °C)  HR:  [] 74  BP: (103-130)/(53-62) 121/62  Resp:  [15-22] 22  SpO2:  [93 %-98 %] 94 %  O2 Device: None (Room air)    Body mass index is 33.84 kg/m².     Input and Output Summary (last 24 hours):     Intake/Output Summary (Last 24 hours) at 5/17/2025 1300  Last data filed at 5/17/2025 1140  Gross per 24 hour   Intake 900 ml   Output 2000 ml   Net -1100 ml       Physical Exam  Vitals and nursing note reviewed.   Constitutional:       Appearance: Normal appearance.   HENT:      Head: Normocephalic and atraumatic.      Mouth/Throat:      Mouth: Mucous membranes are moist.      Pharynx: Oropharynx is clear. No oropharyngeal exudate.     Eyes:      Extraocular Movements: Extraocular movements intact.       Cardiovascular:      Rate and Rhythm: Normal rate and regular rhythm.      Pulses: Normal pulses.      Heart sounds: Normal heart sounds. No murmur heard.     No friction rub. No gallop.   Pulmonary:       Effort: Pulmonary effort is normal. No respiratory distress.      Breath sounds: Normal breath sounds. No stridor. No wheezing or rales.   Abdominal:      General: Abdomen is flat. Bowel sounds are normal. There is no distension.      Palpations: Abdomen is soft.      Tenderness: There is no abdominal tenderness.     Musculoskeletal:      Right lower leg: No edema.      Left lower leg: No edema.     Skin:     General: Skin is warm and dry.      Coloration: Skin is pale.     Neurological:      General: No focal deficit present.      Mental Status: She is alert and oriented to person, place, and time.           Lines/Drains:  Lines/Drains/Airways       Active Status       Name Placement date Placement time Site Days    Urethral Catheter Latex 16 Fr. 04/22/25 2027  Latex  24                  Urinary Catheter:  Goal for removal: N/A - Chronic Bateman                 Lab Results: I have reviewed the following results:   Results from last 7 days   Lab Units 05/17/25  0520   WBC Thousand/uL 9.58   HEMOGLOBIN g/dL 6.9*   HEMATOCRIT % 22.0*   PLATELETS Thousands/uL 223   SEGS PCT % 62   LYMPHO PCT % 24   MONO PCT % 10   EOS PCT % 3     Results from last 7 days   Lab Units 05/17/25  0344 05/15/25  0456 05/14/25  0854   SODIUM mmol/L 135   < > 135   POTASSIUM mmol/L 4.5   < > 3.2*   CHLORIDE mmol/L 97   < > 92*   CO2 mmol/L 33*   < > 36*   BUN mg/dL 15   < > 19   CREATININE mg/dL 1.19   < > 1.56*   ANION GAP mmol/L 5   < > 7   CALCIUM mg/dL 8.5   < > 8.8   ALBUMIN g/dL  --   --  3.2*   TOTAL BILIRUBIN mg/dL  --   --  0.46   ALK PHOS U/L  --   --  116*   ALT U/L  --   --  8   AST U/L  --   --  14   GLUCOSE RANDOM mg/dL 168*   < > 167*    < > = values in this interval not displayed.     Results from last 7 days   Lab Units 05/14/25  0854   INR  1.07             Results from last 7 days   Lab Units 05/14/25  1051 05/14/25  0854   LACTIC ACID mmol/L 1.8 2.2*   PROCALCITONIN ng/ml  --  <0.05       Recent Cultures (last 7 days):    Results from last 7 days   Lab Units 05/14/25  0945   URINE CULTURE  >100,000 cfu/ml Klebsiella pneumoniae ESBL*  20,000-29,000 cfu/ml Pseudomonas aeruginosa*  >100,000 cfu/ml Enterococcus faecalis*       Imaging Results Review: No pertinent imaging studies reviewed.  Other Study Results Review: No additional pertinent studies reviewed.    Last 24 Hours Medication List:     Current Facility-Administered Medications:     acetaminophen (TYLENOL) tablet 650 mg, Q6H PRN    albuterol (PROVENTIL HFA,VENTOLIN HFA) inhaler 2 puff, Q6H PRN    aspirin chewable tablet 81 mg, Daily    atorvastatin (LIPITOR) tablet 40 mg, Daily With Dinner    calcium carbonate (TUMS) chewable tablet 500 mg, TID PRN    docusate sodium (COLACE) capsule 100 mg, BID    famotidine (PEPCID) tablet 20 mg, Daily    fentaNYL (DURAGESIC) 75 mcg/hr TD 72 hr patch 1 patch, Q72H    ferrous sulfate tablet 325 mg, Daily With Breakfast    gabapentin (NEURONTIN) capsule 300 mg, HS    heparin (porcine) subcutaneous injection 5,000 Units, Q8H ARY    latanoprost (XALATAN) 0.005 % ophthalmic solution 1 drop, HS    levothyroxine tablet 37.5 mcg, Early Morning    lidocaine (LIDODERM) 5 % patch 1 patch, Daily    loperamide (IMODIUM) oral liquid 2 mg, TID PRN    methocarbamol (ROBAXIN) tablet 750 mg, Q8H ARY    metoprolol succinate (TOPROL-XL) 24 hr tablet 12.5 mg, Daily    morphine (MSIR) IR tablet 15 mg, Q6H    nystatin (MYCOSTATIN) powder, BID    ondansetron (ZOFRAN) injection 4 mg, Q6H PRN    oxybutynin (DITROPAN) tablet 5 mg, BID    potassium chloride (Klor-Con M20) CR tablet 20 mEq, Daily    torsemide (DEMADEX) tablet 40 mg, Daily    zolpidem (AMBIEN) tablet 5 mg, HS PRN    Administrative Statements   Today, Patient Was Seen By: Naveen Ramon PA-C      **Please Note: This note may have been constructed using a voice recognition system.**

## 2025-05-17 NOTE — PHYSICAL THERAPY NOTE
Physical Therapy Treatment Note     05/17/25 0959   PT Last Visit   PT Visit Date 05/17/25   Note Type   Note Type Treatment   Pain Assessment   Pain Assessment Tool 0-10   Pain Score 10 - Worst Possible Pain   Pain Location/Orientation Orientation: Lower;Location: Back;Orientation: Right   Restrictions/Precautions   Weight Bearing Precautions Per Order No   Other Precautions Chair Alarm;Bed Alarm;Fall Risk;Pain;Multiple lines  (+ ahn)   General   Chart Reviewed Yes   Family/Caregiver Present No   Subjective   Subjective Pt. agreeable to PT   Bed Mobility   Supine to Sit 5  Supervision   Additional items Assist x 1;Bedrails;HOB elevated;Increased time required   Transfers   Sit to Stand 5  Supervision   Additional items Assist x 1;Increased time required;Verbal cues   Stand to Sit 5  Supervision   Additional items Assist x 1;Increased time required;Armrests   Stand pivot 5  Supervision   Additional items Assist x 1;Increased time required;Verbal cues   Ambulation/Elevation   Gait pattern Forward Flexion;Decreased foot clearance;Excessively slow;Short stride;Decreased toe off;Decreased heel strike   Gait Assistance   (CGA)   Additional items Assist x 1;Verbal cues   Assistive Device Rolling walker   Distance 60ft   Balance   Static Sitting Good   Dynamic Sitting Fair   Static Standing Fair   Dynamic Standing Fair -   Ambulatory Fair -   Endurance Deficit   Endurance Deficit Yes   Endurance Deficit Description pain   Activity Tolerance   Activity Tolerance Patient tolerated treatment well   Nurse Made Aware yes   Exercises   THR Supine;AROM;20 reps;Sitting   Assessment   Prognosis Fair   Problem List Decreased range of motion;Decreased endurance;Decreased mobility;Pain;Obesity   Assessment Pt. progressing well with overall mobility. Chronic back pain is a limiting factor for safe mobility. Pt. needed only S/CGA for mobility. Slow gait with excess forward flexion noted. No LOB noted t/o session. Pt. seated on chair  with all needs within reach and chair alarm engaged. Will continue to follow per PT POC.   Barriers to Discharge None   Goals   Patient Goals None reported   STG Expiration Date 05/29/25   PT Treatment Day 1   Plan   Treatment/Interventions Functional transfer training;LE strengthening/ROM;Therapeutic exercise;Patient/family training;Equipment eval/education;Bed mobility;Gait training;Spoke to nursing   Progress Progressing toward goals   PT Frequency 3-5x/wk   Discharge Recommendation   Rehab Resource Intensity Level, PT III (Minimum Resource Intensity)  (w/ HHPT)   Equipment Recommended Walker   AM-PAC Basic Mobility Inpatient   Turning in Flat Bed Without Bedrails 2   Lying on Back to Sitting on Edge of Flat Bed Without Bedrails 2   Moving Bed to Chair 3   Standing Up From Chair Using Arms 3   Walk in Room 3   Climb 3-5 Stairs With Railing 2   Basic Mobility Inpatient Raw Score 15   Basic Mobility Standardized Score 36.97   [facility] Highest Level Of Mobility   -[facility] Goal 4: Move to chair/commode   -HLM Achieved 7: Walk 25 feet or more   End of Consult   Patient Position at End of Consult All needs within reach;Bed/Chair alarm activated;Bedside chair           Heaven Harper PTA    An AM-PAC basic mobility standardized score less than 42.9 suggest the patient may benefit from discharge to post-acute rehab services.

## 2025-05-17 NOTE — PLAN OF CARE
Problem: PAIN - ADULT  Goal: Verbalizes/displays adequate comfort level or baseline comfort level  Description: Interventions:  - Encourage patient to monitor pain and request assistance  - Assess pain using appropriate pain scale  - Administer analgesics as ordered based on type and severity of pain and evaluate response  - Implement non-pharmacological measures as appropriate and evaluate response  - Consider cultural and social influences on pain and pain management  - Notify physician/advanced practitioner if interventions unsuccessful or patient reports new pain  - Educate patient/family on pain management process including their role and importance of  reporting pain   - Provide non-pharmacologic/complimentary pain relief interventions  Outcome: Progressing     Problem: INFECTION - ADULT  Goal: Absence or prevention of progression during hospitalization  Description: INTERVENTIONS:  - Assess and monitor for signs and symptoms of infection  - Monitor lab/diagnostic results  - Monitor all insertion sites, i.e. indwelling lines, tubes, and drains  - Monitor endotracheal if appropriate and nasal secretions for changes in amount and color  - Orange appropriate cooling/warming therapies per order  - Administer medications as ordered  - Instruct and encourage patient and family to use good hand hygiene technique  - Identify and instruct in appropriate isolation precautions for identified infection/condition  Outcome: Progressing  Goal: Absence of fever/infection during neutropenic period  Description: INTERVENTIONS:  - Monitor WBC  - Perform strict hand hygiene  - Limit to healthy visitors only  - No plants, dried, fresh or silk flowers with burns in patient room  Outcome: Progressing     Problem: SAFETY ADULT  Goal: Patient will remain free of falls  Description: INTERVENTIONS:  - Educate patient/family on patient safety including physical limitations  - Instruct patient to call for assistance with activity   -  Consider consulting OT/PT to assist with strengthening/mobility based on AM PAC & JH-HLM score  - Consult OT/PT to assist with strengthening/mobility   - Keep Call bell within reach  - Keep bed low and locked with side rails adjusted as appropriate  - Keep care items and personal belongings within reach  - Initiate and maintain comfort rounds  - Make Fall Risk Sign visible to staff  - Consider moving patient to room near nurses station  Outcome: Progressing  Goal: Maintain or return to baseline ADL function  Description: INTERVENTIONS:  -  Assess patient's ability to carry out ADLs; assess patient's baseline for ADL function and identify physical deficits which impact ability to perform ADLs (bathing, care of mouth/teeth, toileting, grooming, dressing, etc.)  - Assess/evaluate cause of self-care deficits   - Assess range of motion  - Assess patient's mobility; develop plan if impaired  - Assess patient's need for assistive devices and provide as appropriate  - Encourage maximum independence but intervene and supervise when necessary  - Involve family in performance of ADLs  - Assess for home care needs following discharge   - Consider OT consult to assist with ADL evaluation and planning for discharge  - Provide patient education as appropriate  - Monitor functional capacity and physical performance, use of AM PAC & JH-HLM   - Monitor gait, balance and fatigue with ambulation    Outcome: Progressing  Goal: Maintains/Returns to pre admission functional level  Description: INTERVENTIONS:  - Perform AM-PAC 6 Click Basic Mobility/ Daily Activity assessment daily.  - Set and communicate daily mobility goal to care team and patient/family/caregiver.   - Collaborate with rehabilitation services on mobility goals if consulted  - Out of bed for toileting  - Record patient progress and toleration of activity level   Outcome: Progressing     Problem: DISCHARGE PLANNING  Goal: Discharge to home or other facility with  appropriate resources  Description: INTERVENTIONS:  - Identify barriers to discharge w/patient and caregiver  - Arrange for needed discharge resources and transportation as appropriate  - Identify discharge learning needs (meds, wound care, etc.)  - Arrange for interpretive services to assist at discharge as needed  - Refer to Case Management Department for coordinating discharge planning if the patient needs post-hospital services based on physician/advanced practitioner order or complex needs related to functional status, cognitive ability, or social support system  Outcome: Progressing     Problem: Knowledge Deficit  Goal: Patient/family/caregiver demonstrates understanding of disease process, treatment plan, medications, and discharge instructions  Description: Complete learning assessment and assess knowledge base.  Interventions:  - Provide teaching at level of understanding  - Provide teaching via preferred learning methods  Outcome: Progressing     Problem: SKIN/TISSUE INTEGRITY - ADULT  Goal: Incision(s), wounds(s) or drain site(s) healing without S/S of infection  Description: INTERVENTIONS  - Assess and document dressing, incision, wound bed, drain sites and surrounding tissue  - Provide patient and family education  - Perform skin care/dressing changes every shift    Outcome: Progressing     Problem: MUSCULOSKELETAL - ADULT  Goal: Maintain or return mobility to safest level of function  Description: INTERVENTIONS:  - Assess patient's ability to carry out ADLs; assess patient's baseline for ADL function and identify physical deficits which impact ability to perform ADLs (bathing, care of mouth/teeth, toileting, grooming, dressing, etc.)  - Assess/evaluate cause of self-care deficits   - Assess range of motion  - Assess patient's mobility  - Assess patient's need for assistive devices and provide as appropriate  - Encourage maximum independence but intervene and supervise when necessary  - Involve family  in performance of ADLs  - Assess for home care needs following discharge   - Consider OT consult to assist with ADL evaluation and planning for discharge  - Provide patient education as appropriate  Outcome: Progressing

## 2025-05-17 NOTE — PLAN OF CARE
Problem: PHYSICAL THERAPY ADULT  Goal: Performs mobility at highest level of function for planned discharge setting.  See evaluation for individualized goals.  Description: Treatment/Interventions: Functional transfer training, LE strengthening/ROM, Therapeutic exercise, Endurance training, Patient/family training, Bed mobility, Gait training, Spoke to nursing, OT, Spoke to case management  Equipment Recommended: Walker (pt has)       See flowsheet documentation for full assessment, interventions and recommendations.  Outcome: Progressing  Note: Prognosis: Fair  Problem List: Decreased range of motion, Decreased endurance, Decreased mobility, Pain, Obesity  Assessment: Pt. progressing well with overall mobility. Chronic back pain is a limiting factor for safe mobility. Pt. needed only S/CGA for mobility. Slow gait with excess forward flexion noted. No LOB noted t/o session. Pt. seated on chair with all needs within reach and chair alarm engaged. Will continue to follow per PT POC.  Barriers to Discharge: None     Rehab Resource Intensity Level, PT: III (Minimum Resource Intensity) (w/ HHPT)    See flowsheet documentation for full assessment.

## 2025-05-17 NOTE — ASSESSMENT & PLAN NOTE
UTI possibly due to indwelling urethral catheter, present on admission a/e/b chronic ahn catheter and urine culture treated with IV IV Ertapenem, UA, urine culture, lab monitoring and VS monitoring   Urine culture now reporting-               >100,000 cfu/ml Klebsiella pneumoniae Abnormal               20,000-29,000 cfu/ml Pseudomonas aeruginosa Abnormal               >100,000 cfu/ml Enterococcus faecalis Abnormal  Suspect colonization, monitoring off antibiotics

## 2025-05-17 NOTE — ASSESSMENT & PLAN NOTE
Lab Results   Component Value Date    EGFR 42 05/17/2025    EGFR 45 05/16/2025    EGFR 35 05/15/2025    CREATININE 1.19 05/17/2025    CREATININE 1.13 05/16/2025    CREATININE 1.37 (H) 05/15/2025     Creat today appears to be around baseline  Torsemide resumed 40 mg twice daily

## 2025-05-17 NOTE — ASSESSMENT & PLAN NOTE
Met SIRS criteria on admission, with tachycardia, elevated white count  Episode of hypotension that resolved with IV fluids  Suspect white count is likely reactive in setting of fall and laceration  WBC today 8.5  Suspected urine colonization, monitoring off antibiotics

## 2025-05-17 NOTE — ASSESSMENT & PLAN NOTE
With history of chronic pain disorder status post spinal stimulator with degenerative lumbar spinal stenosis   Likely contributing to falls  Home pain med regimen included fentanyl patch, PO morphine, gabapentin, scheduled robaxin  BP runs low at times, likely due to narcotics and decreased PO intake  Geriatrics following

## 2025-05-17 NOTE — PLAN OF CARE
Problem: PAIN - ADULT  Goal: Verbalizes/displays adequate comfort level or baseline comfort level  Description: Interventions:  - Encourage patient to monitor pain and request assistance  - Assess pain using appropriate pain scale  - Administer analgesics as ordered based on type and severity of pain and evaluate response  - Implement non-pharmacological measures as appropriate and evaluate response  - Consider cultural and social influences on pain and pain management  - Notify physician/advanced practitioner if interventions unsuccessful or patient reports new pain  - Educate patient/family on pain management process including their role and importance of  reporting pain   - Provide non-pharmacologic/complimentary pain relief interventions  Outcome: Progressing     Problem: INFECTION - ADULT  Goal: Absence or prevention of progression during hospitalization  Description: INTERVENTIONS:  - Assess and monitor for signs and symptoms of infection  - Monitor lab/diagnostic results  - Monitor all insertion sites, i.e. indwelling lines, tubes, and drains  - Monitor endotracheal if appropriate and nasal secretions for changes in amount and color  - [place] appropriate cooling/warming therapies per order  - Administer medications as ordered  - Instruct and encourage patient and family to use good hand hygiene technique  - Identify and instruct in appropriate isolation precautions for identified infection/condition  Outcome: Progressing  Goal: Absence of fever/infection during neutropenic period  Description: INTERVENTIONS:  - Monitor WBC  - Perform strict hand hygiene  - Limit to healthy visitors only  - No plants, dried, fresh or silk flowers with burns in patient room  Outcome: Progressing     Problem: SAFETY ADULT  Goal: Patient will remain free of falls  Description: INTERVENTIONS:  - Educate patient/family on patient safety including physical limitations  - Instruct patient to call for assistance with activity   -  Consider consulting OT/PT to assist with strengthening/mobility based on AM PAC & JH-HLM score  - Consult OT/PT to assist with strengthening/mobility   - Keep Call bell within reach  - Keep bed low and locked with side rails adjusted as appropriate  - Keep care items and personal belongings within reach  - Initiate and maintain comfort rounds  - Make Fall Risk Sign visible to staff  - Consider moving patient to room near nurses station  Outcome: Progressing  Goal: Maintain or return to baseline ADL function  Description: INTERVENTIONS:  -  Assess patient's ability to carry out ADLs; assess patient's baseline for ADL function and identify physical deficits which impact ability to perform ADLs (bathing, care of mouth/teeth, toileting, grooming, dressing, etc.)  - Assess/evaluate cause of self-care deficits   - Assess range of motion  - Assess patient's mobility; develop plan if impaired  - Assess patient's need for assistive devices and provide as appropriate  - Encourage maximum independence but intervene and supervise when necessary  - Involve family in performance of ADLs  - Assess for home care needs following discharge   - Consider OT consult to assist with ADL evaluation and planning for discharge  - Provide patient education as appropriate  - Monitor functional capacity and physical performance, use of AM PAC & JH-HLM   - Monitor gait, balance and fatigue with ambulation    Outcome: Progressing  Goal: Maintains/Returns to pre admission functional level  Description: INTERVENTIONS:  - Perform AM-PAC 6 Click Basic Mobility/ Daily Activity assessment daily.  - Set and communicate daily mobility goal to care team and patient/family/caregiver.   - Collaborate with rehabilitation services on mobility goals if consulted  - Out of bed for toileting  - Record patient progress and toleration of activity level   Outcome: Progressing     Problem: DISCHARGE PLANNING  Goal: Discharge to home or other facility with  appropriate resources  Description: INTERVENTIONS:  - Identify barriers to discharge w/patient and caregiver  - Arrange for needed discharge resources and transportation as appropriate  - Identify discharge learning needs (meds, wound care, etc.)  - Arrange for interpretive services to assist at discharge as needed  - Refer to Case Management Department for coordinating discharge planning if the patient needs post-hospital services based on physician/advanced practitioner order or complex needs related to functional status, cognitive ability, or social support system  Outcome: Progressing     Problem: Knowledge Deficit  Goal: Patient/family/caregiver demonstrates understanding of disease process, treatment plan, medications, and discharge instructions  Description: Complete learning assessment and assess knowledge base.  Interventions:  - Provide teaching at level of understanding  - Provide teaching via preferred learning methods  Outcome: Progressing     Problem: SKIN/TISSUE INTEGRITY - ADULT  Goal: Incision(s), wounds(s) or drain site(s) healing without S/S of infection  Description: INTERVENTIONS  - Assess and document dressing, incision, wound bed, drain sites and surrounding tissue  - Provide patient and family education  - Perform skin care/dressing changes every shift    Outcome: Progressing     Problem: MUSCULOSKELETAL - ADULT  Goal: Maintain or return mobility to safest level of function  Description: INTERVENTIONS:  - Assess patient's ability to carry out ADLs; assess patient's baseline for ADL function and identify physical deficits which impact ability to perform ADLs (bathing, care of mouth/teeth, toileting, grooming, dressing, etc.)  - Assess/evaluate cause of self-care deficits   - Assess range of motion  - Assess patient's mobility  - Assess patient's need for assistive devices and provide as appropriate  - Encourage maximum independence but intervene and supervise when necessary  - Involve family  in performance of ADLs  - Assess for home care needs following discharge   - Consider OT consult to assist with ADL evaluation and planning for discharge  - Provide patient education as appropriate  Outcome: Progressing

## 2025-05-17 NOTE — ASSESSMENT & PLAN NOTE
80-year-old [facility]  Per patient's report, suspect likely mechanical fall with no preceding symptoms  Had left lower leg laceration sutured by podiatry resident  Denies any loss of consciousness, head trauma  PT and OT to further evaluate as patient does report gait unsteadiness. PT recommending level II rehab services for discharge  At baseline uses rolling walker

## 2025-05-18 PROBLEM — H57.12 EYE PAIN, LEFT: Status: ACTIVE | Noted: 2025-05-18

## 2025-05-18 LAB
ABO GROUP BLD BPU: NORMAL
ANION GAP SERPL CALCULATED.3IONS-SCNC: 4 MMOL/L (ref 4–13)
BASOPHILS # BLD AUTO: 0.04 THOUSANDS/ÂΜL (ref 0–0.1)
BASOPHILS NFR BLD AUTO: 1 % (ref 0–1)
BPU ID: NORMAL
BUN SERPL-MCNC: 16 MG/DL (ref 5–25)
CALCIUM SERPL-MCNC: 8.5 MG/DL (ref 8.4–10.2)
CHLORIDE SERPL-SCNC: 99 MMOL/L (ref 96–108)
CO2 SERPL-SCNC: 35 MMOL/L (ref 21–32)
CREAT SERPL-MCNC: 1.33 MG/DL (ref 0.6–1.3)
CROSSMATCH: NORMAL
EOSINOPHIL # BLD AUTO: 0.45 THOUSAND/ÂΜL (ref 0–0.61)
EOSINOPHIL NFR BLD AUTO: 6 % (ref 0–6)
ERYTHROCYTE [DISTWIDTH] IN BLOOD BY AUTOMATED COUNT: 15.1 % (ref 11.6–15.1)
FOLATE SERPL-MCNC: 17 NG/ML
GFR SERPL CREATININE-BSD FRML MDRD: 37 ML/MIN/1.73SQ M
GLUCOSE SERPL-MCNC: 168 MG/DL (ref 65–140)
HCT VFR BLD AUTO: 25.8 % (ref 34.8–46.1)
HGB BLD-MCNC: 8.1 G/DL (ref 11.5–15.4)
IMM GRANULOCYTES # BLD AUTO: 0.03 THOUSAND/UL (ref 0–0.2)
IMM GRANULOCYTES NFR BLD AUTO: 0 % (ref 0–2)
LYMPHOCYTES # BLD AUTO: 2.25 THOUSANDS/ÂΜL (ref 0.6–4.47)
LYMPHOCYTES NFR BLD AUTO: 29 % (ref 14–44)
MCH RBC QN AUTO: 28.1 PG (ref 26.8–34.3)
MCHC RBC AUTO-ENTMCNC: 31.4 G/DL (ref 31.4–37.4)
MCV RBC AUTO: 90 FL (ref 82–98)
MONOCYTES # BLD AUTO: 0.7 THOUSAND/ÂΜL (ref 0.17–1.22)
MONOCYTES NFR BLD AUTO: 9 % (ref 4–12)
NEUTROPHILS # BLD AUTO: 4.28 THOUSANDS/ÂΜL (ref 1.85–7.62)
NEUTS SEG NFR BLD AUTO: 55 % (ref 43–75)
NRBC BLD AUTO-RTO: 0 /100 WBCS
PLATELET # BLD AUTO: 235 THOUSANDS/UL (ref 149–390)
PMV BLD AUTO: 10.6 FL (ref 8.9–12.7)
POTASSIUM SERPL-SCNC: 3.8 MMOL/L (ref 3.5–5.3)
RBC # BLD AUTO: 2.88 MILLION/UL (ref 3.81–5.12)
SODIUM SERPL-SCNC: 138 MMOL/L (ref 135–147)
UNIT DISPENSE STATUS: NORMAL
UNIT PRODUCT CODE: NORMAL
UNIT PRODUCT VOLUME: 350 ML
UNIT RH: NORMAL
VIT B12 SERPL-MCNC: 388 PG/ML (ref 180–914)
WBC # BLD AUTO: 7.75 THOUSAND/UL (ref 4.31–10.16)

## 2025-05-18 PROCEDURE — 85025 COMPLETE CBC W/AUTO DIFF WBC: CPT | Performed by: PHYSICIAN ASSISTANT

## 2025-05-18 PROCEDURE — 80048 BASIC METABOLIC PNL TOTAL CA: CPT | Performed by: PHYSICIAN ASSISTANT

## 2025-05-18 PROCEDURE — 82607 VITAMIN B-12: CPT | Performed by: PHYSICIAN ASSISTANT

## 2025-05-18 PROCEDURE — 82746 ASSAY OF FOLIC ACID SERUM: CPT | Performed by: PHYSICIAN ASSISTANT

## 2025-05-18 PROCEDURE — 99232 SBSQ HOSP IP/OBS MODERATE 35: CPT | Performed by: PHYSICIAN ASSISTANT

## 2025-05-18 RX ORDER — FERROUS SULFATE 325(65) MG
325 TABLET ORAL
Status: DISCONTINUED | OUTPATIENT
Start: 2025-05-19 | End: 2025-05-19 | Stop reason: HOSPADM

## 2025-05-18 RX ORDER — OFLOXACIN 3 MG/ML
1 SOLUTION/ DROPS OPHTHALMIC 4 TIMES DAILY
Status: DISCONTINUED | OUTPATIENT
Start: 2025-05-18 | End: 2025-05-19 | Stop reason: HOSPADM

## 2025-05-18 RX ADMIN — TORSEMIDE 40 MG: 20 TABLET ORAL at 08:00

## 2025-05-18 RX ADMIN — IRON SUCROSE 300 MG: 20 INJECTION, SOLUTION INTRAVENOUS at 13:24

## 2025-05-18 RX ADMIN — OFLOXACIN 1 DROP: 3 SOLUTION/ DROPS OPHTHALMIC at 12:06

## 2025-05-18 RX ADMIN — FERROUS SULFATE TAB 325 MG (65 MG ELEMENTAL FE) 325 MG: 325 (65 FE) TAB at 07:50

## 2025-05-18 RX ADMIN — MORPHINE SULFATE 15 MG: 15 TABLET ORAL at 05:21

## 2025-05-18 RX ADMIN — LIDOCAINE 5% 1 PATCH: 700 PATCH TOPICAL at 08:02

## 2025-05-18 RX ADMIN — ASPIRIN 81 MG: 81 TABLET, CHEWABLE ORAL at 08:00

## 2025-05-18 RX ADMIN — TORSEMIDE 40 MG: 20 TABLET ORAL at 17:39

## 2025-05-18 RX ADMIN — POTASSIUM CHLORIDE 20 MEQ: 1500 TABLET, EXTENDED RELEASE ORAL at 08:02

## 2025-05-18 RX ADMIN — LATANOPROST 1 DROP: 50 SOLUTION OPHTHALMIC at 21:24

## 2025-05-18 RX ADMIN — MORPHINE SULFATE 15 MG: 15 TABLET ORAL at 00:24

## 2025-05-18 RX ADMIN — DOCUSATE SODIUM 100 MG: 100 CAPSULE, LIQUID FILLED ORAL at 08:00

## 2025-05-18 RX ADMIN — OFLOXACIN 1 DROP: 3 SOLUTION/ DROPS OPHTHALMIC at 21:24

## 2025-05-18 RX ADMIN — FAMOTIDINE 20 MG: 20 TABLET, FILM COATED ORAL at 08:00

## 2025-05-18 RX ADMIN — OXYBUTYNIN CHLORIDE 5 MG: 5 TABLET ORAL at 08:00

## 2025-05-18 RX ADMIN — DOCUSATE SODIUM 100 MG: 100 CAPSULE, LIQUID FILLED ORAL at 17:40

## 2025-05-18 RX ADMIN — HEPARIN SODIUM 5000 UNITS: 5000 INJECTION INTRAVENOUS; SUBCUTANEOUS at 05:18

## 2025-05-18 RX ADMIN — OFLOXACIN 1 DROP: 3 SOLUTION/ DROPS OPHTHALMIC at 17:40

## 2025-05-18 RX ADMIN — GABAPENTIN 300 MG: 300 CAPSULE ORAL at 21:24

## 2025-05-18 RX ADMIN — MORPHINE SULFATE 15 MG: 15 TABLET ORAL at 12:06

## 2025-05-18 RX ADMIN — LEVOTHYROXINE SODIUM 37.5 MCG: 75 TABLET ORAL at 05:16

## 2025-05-18 RX ADMIN — MORPHINE SULFATE 15 MG: 15 TABLET ORAL at 23:51

## 2025-05-18 RX ADMIN — ACETAMINOPHEN 650 MG: 325 TABLET, FILM COATED ORAL at 08:01

## 2025-05-18 RX ADMIN — METHOCARBAMOL 750 MG: 750 TABLET ORAL at 13:33

## 2025-05-18 RX ADMIN — METHOCARBAMOL 750 MG: 750 TABLET ORAL at 21:24

## 2025-05-18 RX ADMIN — ATORVASTATIN CALCIUM 40 MG: 80 TABLET, FILM COATED ORAL at 17:40

## 2025-05-18 RX ADMIN — NYSTATIN: 100000 POWDER TOPICAL at 17:41

## 2025-05-18 RX ADMIN — HEPARIN SODIUM 5000 UNITS: 5000 INJECTION INTRAVENOUS; SUBCUTANEOUS at 13:33

## 2025-05-18 RX ADMIN — OXYBUTYNIN CHLORIDE 5 MG: 5 TABLET ORAL at 21:24

## 2025-05-18 RX ADMIN — ZOLPIDEM TARTRATE 5 MG: 5 TABLET, FILM COATED ORAL at 22:56

## 2025-05-18 RX ADMIN — METHOCARBAMOL 750 MG: 750 TABLET ORAL at 05:16

## 2025-05-18 RX ADMIN — MORPHINE SULFATE 15 MG: 15 TABLET ORAL at 17:40

## 2025-05-18 RX ADMIN — METOPROLOL SUCCINATE 12.5 MG: 25 TABLET, EXTENDED RELEASE ORAL at 08:00

## 2025-05-18 RX ADMIN — HEPARIN SODIUM 5000 UNITS: 5000 INJECTION INTRAVENOUS; SUBCUTANEOUS at 21:24

## 2025-05-18 RX ADMIN — NYSTATIN: 100000 POWDER TOPICAL at 08:02

## 2025-05-18 NOTE — ASSESSMENT & PLAN NOTE
Reported left eye pain today  History of glaucoma, on latanoprost  Eye pressure measured at 8 mmHg  With some discharge noted, start antibiotic eyedrops

## 2025-05-18 NOTE — ASSESSMENT & PLAN NOTE
Lab Results   Component Value Date    EGFR 37 05/18/2025    EGFR 42 05/17/2025    EGFR 45 05/16/2025    CREATININE 1.33 (H) 05/18/2025    CREATININE 1.19 05/17/2025    CREATININE 1.13 05/16/2025     Creat today appears to be around baseline  Torsemide resumed 40 mg twice daily

## 2025-05-18 NOTE — ASSESSMENT & PLAN NOTE
80-year-old female who presents from [facility] assisted living facility reportedly had a fall  Per patient's report, suspect likely mechanical fall with no preceding symptoms  Had left lower leg laceration sutured by podiatry resident  Denies any loss of consciousness, head trauma  PT and OT to further evaluate as patient does report gait unsteadiness. PT recommending level II rehab services for discharge  At baseline uses rolling walker

## 2025-05-18 NOTE — PLAN OF CARE
Problem: PAIN - ADULT  Goal: Verbalizes/displays adequate comfort level or baseline comfort level  Description: Interventions:  - Encourage patient to monitor pain and request assistance  - Assess pain using appropriate pain scale  - Administer analgesics as ordered based on type and severity of pain and evaluate response  - Implement non-pharmacological measures as appropriate and evaluate response  - Consider cultural and social influences on pain and pain management  - Notify physician/advanced practitioner if interventions unsuccessful or patient reports new pain  - Educate patient/family on pain management process including their role and importance of  reporting pain   - Provide non-pharmacologic/complimentary pain relief interventions  Outcome: Progressing     Problem: INFECTION - ADULT  Goal: Absence or prevention of progression during hospitalization  Description: INTERVENTIONS:  - Assess and monitor for signs and symptoms of infection  - Monitor lab/diagnostic results  - Monitor all insertion sites, i.e. indwelling lines, tubes, and drains  - Monitor endotracheal if appropriate and nasal secretions for changes in amount and color  - [place] appropriate cooling/warming therapies per order  - Administer medications as ordered  - Instruct and encourage patient and family to use good hand hygiene technique  - Identify and instruct in appropriate isolation precautions for identified infection/condition  Outcome: Progressing  Goal: Absence of fever/infection during neutropenic period  Description: INTERVENTIONS:  - Monitor WBC  - Perform strict hand hygiene  - Limit to healthy visitors only  - No plants, dried, fresh or silk flowers with burns in patient room  Outcome: Progressing     Problem: SAFETY ADULT  Goal: Patient will remain free of falls  Description: INTERVENTIONS:  - Educate patient/family on patient safety including physical limitations  - Instruct patient to call for assistance with activity   -  Consider consulting OT/PT to assist with strengthening/mobility based on AM PAC & JH-HLM score  - Consult OT/PT to assist with strengthening/mobility   - Keep Call bell within reach  - Keep bed low and locked with side rails adjusted as appropriate  - Keep care items and personal belongings within reach  - Initiate and maintain comfort rounds  - Make Fall Risk Sign visible to staff  - Consider moving patient to room near nurses station  Outcome: Progressing  Goal: Maintain or return to baseline ADL function  Description: INTERVENTIONS:  -  Assess patient's ability to carry out ADLs; assess patient's baseline for ADL function and identify physical deficits which impact ability to perform ADLs (bathing, care of mouth/teeth, toileting, grooming, dressing, etc.)  - Assess/evaluate cause of self-care deficits   - Assess range of motion  - Assess patient's mobility; develop plan if impaired  - Assess patient's need for assistive devices and provide as appropriate  - Encourage maximum independence but intervene and supervise when necessary  - Involve family in performance of ADLs  - Assess for home care needs following discharge   - Consider OT consult to assist with ADL evaluation and planning for discharge  - Provide patient education as appropriate  - Monitor functional capacity and physical performance, use of AM PAC & JH-HLM   - Monitor gait, balance and fatigue with ambulation    Outcome: Progressing  Goal: Maintains/Returns to pre admission functional level  Description: INTERVENTIONS:  - Perform AM-PAC 6 Click Basic Mobility/ Daily Activity assessment daily.  - Set and communicate daily mobility goal to care team and patient/family/caregiver.   - Collaborate with rehabilitation services on mobility goals if consulted  - Out of bed for toileting  - Record patient progress and toleration of activity level   Outcome: Progressing     Problem: DISCHARGE PLANNING  Goal: Discharge to home or other facility with  appropriate resources  Description: INTERVENTIONS:  - Identify barriers to discharge w/patient and caregiver  - Arrange for needed discharge resources and transportation as appropriate  - Identify discharge learning needs (meds, wound care, etc.)  - Arrange for interpretive services to assist at discharge as needed  - Refer to Case Management Department for coordinating discharge planning if the patient needs post-hospital services based on physician/advanced practitioner order or complex needs related to functional status, cognitive ability, or social support system  Outcome: Progressing     Problem: Knowledge Deficit  Goal: Patient/family/caregiver demonstrates understanding of disease process, treatment plan, medications, and discharge instructions  Description: Complete learning assessment and assess knowledge base.  Interventions:  - Provide teaching at level of understanding  - Provide teaching via preferred learning methods  Outcome: Progressing     Problem: SKIN/TISSUE INTEGRITY - ADULT  Goal: Incision(s), wounds(s) or drain site(s) healing without S/S of infection  Description: INTERVENTIONS  - Assess and document dressing, incision, wound bed, drain sites and surrounding tissue  - Provide patient and family education  - Perform skin care/dressing changes every shift    Outcome: Progressing     Problem: MUSCULOSKELETAL - ADULT  Goal: Maintain or return mobility to safest level of function  Description: INTERVENTIONS:  - Assess patient's ability to carry out ADLs; assess patient's baseline for ADL function and identify physical deficits which impact ability to perform ADLs (bathing, care of mouth/teeth, toileting, grooming, dressing, etc.)  - Assess/evaluate cause of self-care deficits   - Assess range of motion  - Assess patient's mobility  - Assess patient's need for assistive devices and provide as appropriate  - Encourage maximum independence but intervene and supervise when necessary  - Involve family  in performance of ADLs  - Assess for home care needs following discharge   - Consider OT consult to assist with ADL evaluation and planning for discharge  - Provide patient education as appropriate  Outcome: Progressing

## 2025-05-18 NOTE — PROGRESS NOTES
Progress Note - Hospitalist   Name: Mattie Varela 80 y.o. female I MRN: [id number]  Unit/Bed#: E5 -01 I Date of Admission: 5/14/2025   Date of Service: 5/18/2025 I Hospital Day: 3    Assessment & Plan  Fall  80-year-old female who presents from [facility] assisted living facility reportedly had a fall  Per patient's report, suspect likely mechanical fall with no preceding symptoms  Had left lower leg laceration sutured by podiatry resident  Denies any loss of consciousness, head trauma  PT and OT to further evaluate as patient does report gait unsteadiness. PT recommending level II rehab services for discharge  At baseline uses rolling walker  Hypothyroidism  Continue levothyroxine 37.5mcg daily  Chronic obstructive pulmonary disease, unspecified COPD type (HCC)  Albuterol as needed, not in any distress  Abnormal urinalysis  Previous urine cultures growing ESBL Klebsiella, report anaphylaxis to penicillins  Given 1 dose of ertapenem 5/14  Urine culture now reporting-   >100,000 cfu/ml Klebsiella pneumoniae Abnormal    20,000-29,000 cfu/ml Pseudomonas aeruginosa Abnormal    >100,000 cfu/ml Enterococcus faecalis Abnormal    Will continue ertapenem for now pending sensitivities  Transition to oral antibiotics for discharge  Urinary retention  Chronic Bateman catheter  Chronic diastolic congestive heart failure (HCC)  Wt Readings from Last 3 Encounters:   05/15/25 78.6 kg (173 lb 4.5 oz)   04/23/25 78.6 kg (173 lb 4.5 oz)   04/11/25 79.8 kg (176 lb)     Chest x-ray reports- Low lung volumes with bibasilar atelectasis on the lateral projection   Was on room air but currently on 2L oxygen  Torsemide was held as UA did show hyaline casts indicating mild dehydration  Resumed on torsemide 40 mg BID  Gastro-esophageal reflux disease without esophagitis  Continue Pepcid  Complains of heartburn today- added PRN tums  SIRS (systemic inflammatory response syndrome) (HCC)  Met SIRS criteria on admission, with tachycardia,  elevated white count  Episode of hypotension that resolved with IV fluids  Suspect white count is likely reactive in setting of fall and laceration  WBC today 8.5  Suspected urine colonization, monitoring off antibiotics  Iron deficiency anemia secondary to inadequate dietary iron intake  Hgb 8.5  Continue iron supplementation  Receiving 1 unit PRBC  Hypokalemia  K 3.3 today, minimally improved  Continue PO supplementation - additional 40mE ordered today  CKD (chronic kidney disease)  Lab Results   Component Value Date    EGFR 37 05/18/2025    EGFR 42 05/17/2025    EGFR 45 05/16/2025    CREATININE 1.33 (H) 05/18/2025    CREATININE 1.19 05/17/2025    CREATININE 1.13 05/16/2025     Creat today appears to be around baseline  Torsemide resumed 40 mg twice daily  Chronic pain  With history of chronic pain disorder status post spinal stimulator with degenerative lumbar spinal stenosis   Likely contributing to falls  Home pain med regimen included fentanyl patch, PO morphine, gabapentin, scheduled robaxin  BP runs low at times, likely due to narcotics and decreased PO intake  Geriatrics following  Sepsis ([facility])  Sepsis present on admission due to possible UTI a/e/b WBC 14.02,  and lactic acid 2.2 treated with IV Ertapenem, IVF, urine culture, lab monitoring and VS monitoring   Bacteriuria  UTI possibly due to indwelling urethral catheter, present on admission a/e/b chronic ahn catheter and urine culture treated with IV IV Ertapenem, UA, urine culture, lab monitoring and VS monitoring   Urine culture now reporting-               >100,000 cfu/ml Klebsiella pneumoniae Abnormal               20,000-29,000 cfu/ml Pseudomonas aeruginosa Abnormal               >100,000 cfu/ml Enterococcus faecalis Abnormal  Suspect colonization, monitoring off antibiotics  Eye pain, left  Reported left eye pain today  History of glaucoma, on latanoprost  Eye pressure measured at 8 mmHg  With some discharge noted, start antibiotic  eyedrops      VTE Pharmacologic Prophylaxis:   High Risk (Score >/= 5) - Pharmacological DVT Prophylaxis Ordered: heparin. Sequential Compression Devices Ordered.    Mobility:   Basic Mobility Inpatient Raw Score: 18  JH-HLM Goal: 6: Walk 10 steps or more  JH-HLM Achieved: 4: Move to chair/commode  JH-HLM Goal NOT achieved. Continue with multidisciplinary rounding and encourage appropriate mobility to improve upon JH-HLM goals.    Patient Centered Rounds: I performed bedside rounds with nursing staff today.   Discussions with Specialists or Other Care Team Provider: None    Education and Discussions with Family / Patient: Attempted to update  (daughter) via phone. Left voicemail.     Current Length of Stay: 3 day(s)  Current Patient Status: Inpatient   Certification Statement: The patient will continue to require additional inpatient hospital stay due to hemoglobin monitoring  Discharge Plan: Anticipate discharge tomorrow to prior assisted or independent living facility.    Code Status: Level 1 - Full Code    Subjective   Patient reports that his weakness/tiredness has improved, however she is reporting left eye pain today without any visual changes.  Noted some discharge, started on antibiotic eyedrops.    Objective :  Temp:  [98.2 °F (36.8 °C)-99.7 °F (37.6 °C)] 98.6 °F (37 °C)  HR:  [63-92] 92  BP: ()/(51-62) 110/62  Resp:  [16-22] 16  SpO2:  [90 %-99 %] 94 %  O2 Device: None (Room air)    Body mass index is 33.84 kg/m².     Input and Output Summary (last 24 hours):     Intake/Output Summary (Last 24 hours) at 5/18/2025 1046  Last data filed at 5/18/2025 0843  Gross per 24 hour   Intake 660 ml   Output 2625 ml   Net -1965 ml       Physical Exam  Vitals and nursing note reviewed.   Constitutional:       Appearance: Normal appearance.   HENT:      Head: Normocephalic and atraumatic.      Mouth/Throat:      Mouth: Mucous membranes are moist.      Pharynx: Oropharynx is clear. No oropharyngeal  exudate.     Eyes:      Extraocular Movements: Extraocular movements intact.       Cardiovascular:      Rate and Rhythm: Normal rate and regular rhythm.      Pulses: Normal pulses.      Heart sounds: Normal heart sounds. No murmur heard.     No friction rub. No gallop.   Pulmonary:      Effort: Pulmonary effort is normal. No respiratory distress.      Breath sounds: Normal breath sounds. No stridor. No wheezing or rales.   Abdominal:      General: Abdomen is flat. Bowel sounds are normal. There is no distension.      Palpations: Abdomen is soft.      Tenderness: There is no abdominal tenderness.     Musculoskeletal:      Right lower leg: No edema.      Left lower leg: No edema.     Skin:     General: Skin is warm and dry.     Neurological:      General: No focal deficit present.      Mental Status: She is alert and oriented to person, place, and time.           Lines/Drains:  Lines/Drains/Airways       Active Status       Name Placement date Placement time Site Days    Urethral Catheter Latex 16 Fr. 04/22/25 2027  Latex  25                  Urinary Catheter:  Goal for removal: N/A - Chronic Bateman                 Lab Results: I have reviewed the following results:   Results from last 7 days   Lab Units 05/18/25  0434   WBC Thousand/uL 7.75   HEMOGLOBIN g/dL 8.1*   HEMATOCRIT % 25.8*   PLATELETS Thousands/uL 235   SEGS PCT % 55   LYMPHO PCT % 29   MONO PCT % 9   EOS PCT % 6     Results from last 7 days   Lab Units 05/18/25  0434 05/15/25  0456 05/14/25  0854   SODIUM mmol/L 138   < > 135   POTASSIUM mmol/L 3.8   < > 3.2*   CHLORIDE mmol/L 99   < > 92*   CO2 mmol/L 35*   < > 36*   BUN mg/dL 16   < > 19   CREATININE mg/dL 1.33*   < > 1.56*   ANION GAP mmol/L 4   < > 7   CALCIUM mg/dL 8.5   < > 8.8   ALBUMIN g/dL  --   --  3.2*   TOTAL BILIRUBIN mg/dL  --   --  0.46   ALK PHOS U/L  --   --  116*   ALT U/L  --   --  8   AST U/L  --   --  14   GLUCOSE RANDOM mg/dL 168*   < > 167*    < > = values in this interval not  displayed.     Results from last 7 days   Lab Units 05/14/25  0854   INR  1.07             Results from last 7 days   Lab Units 05/14/25  1051 05/14/25  0854   LACTIC ACID mmol/L 1.8 2.2*   PROCALCITONIN ng/ml  --  <0.05       Recent Cultures (last 7 days):   Results from last 7 days   Lab Units 05/14/25  0945   URINE CULTURE  >100,000 cfu/ml Klebsiella pneumoniae ESBL*  20,000-29,000 cfu/ml Pseudomonas aeruginosa*  >100,000 cfu/ml Enterococcus faecalis*       Imaging Results Review: No pertinent imaging studies reviewed.  Other Study Results Review: No additional pertinent studies reviewed.    Last 24 Hours Medication List:     Current Facility-Administered Medications:     acetaminophen (TYLENOL) tablet 650 mg, Q6H PRN    albuterol (PROVENTIL HFA,VENTOLIN HFA) inhaler 2 puff, Q6H PRN    aspirin chewable tablet 81 mg, Daily    atorvastatin (LIPITOR) tablet 40 mg, Daily With Dinner    calcium carbonate (TUMS) chewable tablet 500 mg, TID PRN    docusate sodium (COLACE) capsule 100 mg, BID    famotidine (PEPCID) tablet 20 mg, Daily    fentaNYL (DURAGESIC) 75 mcg/hr TD 72 hr patch 1 patch, Q72H    [START ON 5/19/2025] ferrous sulfate tablet 325 mg, Daily With Lunch    gabapentin (NEURONTIN) capsule 300 mg, HS    heparin (porcine) subcutaneous injection 5,000 Units, Q8H ARY    latanoprost (XALATAN) 0.005 % ophthalmic solution 1 drop, HS    levothyroxine tablet 37.5 mcg, Early Morning    lidocaine (LIDODERM) 5 % patch 1 patch, Daily    loperamide (IMODIUM) oral liquid 2 mg, TID PRN    methocarbamol (ROBAXIN) tablet 750 mg, Q8H ARY    metoprolol succinate (TOPROL-XL) 24 hr tablet 12.5 mg, Daily    morphine (MSIR) IR tablet 15 mg, Q6H    nystatin (MYCOSTATIN) powder, BID    ofloxacin (OCUFLOX) 0.3 % ophthalmic solution 1 drop, 4x Daily    ondansetron (ZOFRAN) injection 4 mg, Q6H PRN    oxybutynin (DITROPAN) tablet 5 mg, BID    potassium chloride (Klor-Con M20) CR tablet 20 mEq, Daily    torsemide (DEMADEX) tablet 40 mg,  BID    zolpidem (AMBIEN) tablet 5 mg, HS PRN    Administrative Statements   Today, Patient Was Seen By: Naveen Ramon PA-C      **Please Note: This note may have been constructed using a voice recognition system.**

## 2025-05-18 NOTE — PLAN OF CARE
Problem: PAIN - ADULT  Goal: Verbalizes/displays adequate comfort level or baseline comfort level  Description: Interventions:  - Encourage patient to monitor pain and request assistance  - Assess pain using appropriate pain scale  - Administer analgesics as ordered based on type and severity of pain and evaluate response  - Implement non-pharmacological measures as appropriate and evaluate response  - Consider cultural and social influences on pain and pain management  - Notify physician/advanced practitioner if interventions unsuccessful or patient reports new pain  - Educate patient/family on pain management process including their role and importance of  reporting pain   - Provide non-pharmacologic/complimentary pain relief interventions  Outcome: Progressing     Problem: INFECTION - ADULT  Goal: Absence or prevention of progression during hospitalization  Description: INTERVENTIONS:  - Assess and monitor for signs and symptoms of infection  - Monitor lab/diagnostic results  - Monitor all insertion sites, i.e. indwelling lines, tubes, and drains  - Monitor endotracheal if appropriate and nasal secretions for changes in amount and color  - Muir appropriate cooling/warming therapies per order  - Administer medications as ordered  - Instruct and encourage patient and family to use good hand hygiene technique  - Identify and instruct in appropriate isolation precautions for identified infection/condition  Outcome: Progressing  Goal: Absence of fever/infection during neutropenic period  Description: INTERVENTIONS:  - Monitor WBC  - Perform strict hand hygiene  - Limit to healthy visitors only  - No plants, dried, fresh or silk flowers with burns in patient room  Outcome: Progressing     Problem: SAFETY ADULT  Goal: Patient will remain free of falls  Description: INTERVENTIONS:  - Educate patient/family on patient safety including physical limitations  - Instruct patient to call for assistance with activity   -  Consider consulting OT/PT to assist with strengthening/mobility based on AM PAC & JH-HLM score  - Consult OT/PT to assist with strengthening/mobility   - Keep Call bell within reach  - Keep bed low and locked with side rails adjusted as appropriate  - Keep care items and personal belongings within reach  - Initiate and maintain comfort rounds  - Make Fall Risk Sign visible to staff  - Consider moving patient to room near nurses station  Outcome: Progressing  Goal: Maintain or return to baseline ADL function  Description: INTERVENTIONS:  -  Assess patient's ability to carry out ADLs; assess patient's baseline for ADL function and identify physical deficits which impact ability to perform ADLs (bathing, care of mouth/teeth, toileting, grooming, dressing, etc.)  - Assess/evaluate cause of self-care deficits   - Assess range of motion  - Assess patient's mobility; develop plan if impaired  - Assess patient's need for assistive devices and provide as appropriate  - Encourage maximum independence but intervene and supervise when necessary  - Involve family in performance of ADLs  - Assess for home care needs following discharge   - Consider OT consult to assist with ADL evaluation and planning for discharge  - Provide patient education as appropriate  - Monitor functional capacity and physical performance, use of AM PAC & JH-HLM   - Monitor gait, balance and fatigue with ambulation    Outcome: Progressing  Goal: Maintains/Returns to pre admission functional level  Description: INTERVENTIONS:  - Perform AM-PAC 6 Click Basic Mobility/ Daily Activity assessment daily.  - Set and communicate daily mobility goal to care team and patient/family/caregiver.   - Collaborate with rehabilitation services on mobility goals if consulted  - Out of bed for toileting  - Record patient progress and toleration of activity level   Outcome: Progressing     Problem: DISCHARGE PLANNING  Goal: Discharge to home or other facility with  appropriate resources  Description: INTERVENTIONS:  - Identify barriers to discharge w/patient and caregiver  - Arrange for needed discharge resources and transportation as appropriate  - Identify discharge learning needs (meds, wound care, etc.)  - Arrange for interpretive services to assist at discharge as needed  - Refer to Case Management Department for coordinating discharge planning if the patient needs post-hospital services based on physician/advanced practitioner order or complex needs related to functional status, cognitive ability, or social support system  Outcome: Progressing     Problem: Knowledge Deficit  Goal: Patient/family/caregiver demonstrates understanding of disease process, treatment plan, medications, and discharge instructions  Description: Complete learning assessment and assess knowledge base.  Interventions:  - Provide teaching at level of understanding  - Provide teaching via preferred learning methods  Outcome: Progressing     Problem: SKIN/TISSUE INTEGRITY - ADULT  Goal: Incision(s), wounds(s) or drain site(s) healing without S/S of infection  Description: INTERVENTIONS  - Assess and document dressing, incision, wound bed, drain sites and surrounding tissue  - Provide patient and family education  - Perform skin care/dressing changes every shift    Outcome: Progressing     Problem: MUSCULOSKELETAL - ADULT  Goal: Maintain or return mobility to safest level of function  Description: INTERVENTIONS:  - Assess patient's ability to carry out ADLs; assess patient's baseline for ADL function and identify physical deficits which impact ability to perform ADLs (bathing, care of mouth/teeth, toileting, grooming, dressing, etc.)  - Assess/evaluate cause of self-care deficits   - Assess range of motion  - Assess patient's mobility  - Assess patient's need for assistive devices and provide as appropriate  - Encourage maximum independence but intervene and supervise when necessary  - Involve family  in performance of ADLs  - Assess for home care needs following discharge   - Consider OT consult to assist with ADL evaluation and planning for discharge  - Provide patient education as appropriate  Outcome: Progressing

## 2025-05-19 VITALS
DIASTOLIC BLOOD PRESSURE: 58 MMHG | SYSTOLIC BLOOD PRESSURE: 97 MMHG | OXYGEN SATURATION: 93 % | TEMPERATURE: 98 F | HEIGHT: 60 IN | HEART RATE: 105 BPM | WEIGHT: 173.28 LBS | RESPIRATION RATE: 16 BRPM | BODY MASS INDEX: 34.02 KG/M2

## 2025-05-19 LAB
ANION GAP SERPL CALCULATED.3IONS-SCNC: 8 MMOL/L (ref 4–13)
BASOPHILS # BLD AUTO: 0.05 THOUSANDS/ÂΜL (ref 0–0.1)
BASOPHILS NFR BLD AUTO: 1 % (ref 0–1)
BUN SERPL-MCNC: 15 MG/DL (ref 5–25)
CALCIUM SERPL-MCNC: 8.5 MG/DL (ref 8.4–10.2)
CHLORIDE SERPL-SCNC: 97 MMOL/L (ref 96–108)
CO2 SERPL-SCNC: 33 MMOL/L (ref 21–32)
CREAT SERPL-MCNC: 1.37 MG/DL (ref 0.6–1.3)
EOSINOPHIL # BLD AUTO: 0.39 THOUSAND/ÂΜL (ref 0–0.61)
EOSINOPHIL NFR BLD AUTO: 4 % (ref 0–6)
ERYTHROCYTE [DISTWIDTH] IN BLOOD BY AUTOMATED COUNT: 15.6 % (ref 11.6–15.1)
GFR SERPL CREATININE-BSD FRML MDRD: 35 ML/MIN/1.73SQ M
GLUCOSE SERPL-MCNC: 194 MG/DL (ref 65–140)
HCT VFR BLD AUTO: 27.4 % (ref 34.8–46.1)
HGB BLD-MCNC: 8.9 G/DL (ref 11.5–15.4)
IMM GRANULOCYTES # BLD AUTO: 0.04 THOUSAND/UL (ref 0–0.2)
IMM GRANULOCYTES NFR BLD AUTO: 0 % (ref 0–2)
LYMPHOCYTES # BLD AUTO: 1.91 THOUSANDS/ÂΜL (ref 0.6–4.47)
LYMPHOCYTES NFR BLD AUTO: 19 % (ref 14–44)
MCH RBC QN AUTO: 28.9 PG (ref 26.8–34.3)
MCHC RBC AUTO-ENTMCNC: 32.5 G/DL (ref 31.4–37.4)
MCV RBC AUTO: 89 FL (ref 82–98)
MONOCYTES # BLD AUTO: 1 THOUSAND/ÂΜL (ref 0.17–1.22)
MONOCYTES NFR BLD AUTO: 10 % (ref 4–12)
NEUTROPHILS # BLD AUTO: 6.76 THOUSANDS/ÂΜL (ref 1.85–7.62)
NEUTS SEG NFR BLD AUTO: 66 % (ref 43–75)
NRBC BLD AUTO-RTO: 0 /100 WBCS
PLATELET # BLD AUTO: 271 THOUSANDS/UL (ref 149–390)
PMV BLD AUTO: 10.2 FL (ref 8.9–12.7)
POTASSIUM SERPL-SCNC: 4.1 MMOL/L (ref 3.5–5.3)
RBC # BLD AUTO: 3.08 MILLION/UL (ref 3.81–5.12)
SODIUM SERPL-SCNC: 138 MMOL/L (ref 135–147)
WBC # BLD AUTO: 10.15 THOUSAND/UL (ref 4.31–10.16)

## 2025-05-19 PROCEDURE — 97535 SELF CARE MNGMENT TRAINING: CPT

## 2025-05-19 PROCEDURE — 97530 THERAPEUTIC ACTIVITIES: CPT

## 2025-05-19 PROCEDURE — 85025 COMPLETE CBC W/AUTO DIFF WBC: CPT | Performed by: PHYSICIAN ASSISTANT

## 2025-05-19 PROCEDURE — 80048 BASIC METABOLIC PNL TOTAL CA: CPT | Performed by: PHYSICIAN ASSISTANT

## 2025-05-19 PROCEDURE — 99239 HOSP IP/OBS DSCHRG MGMT >30: CPT | Performed by: PHYSICIAN ASSISTANT

## 2025-05-19 RX ORDER — OFLOXACIN 3 MG/ML
1 SOLUTION/ DROPS OPHTHALMIC 4 TIMES DAILY
Qty: 5 ML | Refills: 0 | Status: SHIPPED | OUTPATIENT
Start: 2025-05-19 | End: 2025-05-25

## 2025-05-19 RX ORDER — METOCLOPRAMIDE HYDROCHLORIDE 5 MG/ML
10 INJECTION INTRAMUSCULAR; INTRAVENOUS EVERY 6 HOURS PRN
Status: DISCONTINUED | OUTPATIENT
Start: 2025-05-19 | End: 2025-05-19 | Stop reason: HOSPADM

## 2025-05-19 RX ORDER — TORSEMIDE 20 MG/1
40 TABLET ORAL 2 TIMES DAILY
Qty: 120 TABLET | Refills: 0 | Status: SHIPPED | OUTPATIENT
Start: 2025-05-19

## 2025-05-19 RX ADMIN — CALCIUM CARBONATE (ANTACID) CHEW TAB 500 MG 500 MG: 500 CHEW TAB at 04:16

## 2025-05-19 RX ADMIN — POTASSIUM CHLORIDE 20 MEQ: 1500 TABLET, EXTENDED RELEASE ORAL at 08:27

## 2025-05-19 RX ADMIN — FERROUS SULFATE TAB 325 MG (65 MG ELEMENTAL FE) 325 MG: 325 (65 FE) TAB at 11:41

## 2025-05-19 RX ADMIN — NYSTATIN: 100000 POWDER TOPICAL at 08:27

## 2025-05-19 RX ADMIN — ONDANSETRON 4 MG: 2 INJECTION INTRAMUSCULAR; INTRAVENOUS at 01:17

## 2025-05-19 RX ADMIN — MORPHINE SULFATE 15 MG: 15 TABLET ORAL at 05:43

## 2025-05-19 RX ADMIN — IRON SUCROSE 300 MG: 20 INJECTION, SOLUTION INTRAVENOUS at 08:27

## 2025-05-19 RX ADMIN — LOPERAMIDE HCL 2 MG: 1 SOLUTION ORAL at 04:16

## 2025-05-19 RX ADMIN — METHOCARBAMOL 750 MG: 750 TABLET ORAL at 04:03

## 2025-05-19 RX ADMIN — OFLOXACIN 1 DROP: 3 SOLUTION/ DROPS OPHTHALMIC at 11:41

## 2025-05-19 RX ADMIN — LIDOCAINE 5% 1 PATCH: 700 PATCH TOPICAL at 08:27

## 2025-05-19 RX ADMIN — LEVOTHYROXINE SODIUM 37.5 MCG: 75 TABLET ORAL at 04:02

## 2025-05-19 RX ADMIN — FAMOTIDINE 20 MG: 20 TABLET, FILM COATED ORAL at 08:27

## 2025-05-19 RX ADMIN — ASPIRIN 81 MG: 81 TABLET, CHEWABLE ORAL at 08:27

## 2025-05-19 RX ADMIN — OFLOXACIN 1 DROP: 3 SOLUTION/ DROPS OPHTHALMIC at 08:28

## 2025-05-19 RX ADMIN — DOCUSATE SODIUM 100 MG: 100 CAPSULE, LIQUID FILLED ORAL at 08:27

## 2025-05-19 RX ADMIN — MORPHINE SULFATE 15 MG: 15 TABLET ORAL at 11:41

## 2025-05-19 RX ADMIN — HEPARIN SODIUM 5000 UNITS: 5000 INJECTION INTRAVENOUS; SUBCUTANEOUS at 04:07

## 2025-05-19 RX ADMIN — OXYBUTYNIN CHLORIDE 5 MG: 5 TABLET ORAL at 08:27

## 2025-05-19 NOTE — RESTORATIVE TECHNICIAN NOTE
Restorative Technician Note      Patient Name: Mattie Sureshcoopergisell     Restorative Tech Visit Date: 05/19/25  Note Type: Mobility  Patient Position Upon Consult: Supine  Activity Performed: Ambulated  Assistive Device: Roller walker  Patient Position at End of Consult: Bedside chair; All needs within reach; Bed/Chair alarm activated          ns

## 2025-05-19 NOTE — DISCHARGE INSTR - AVS FIRST PAGE
Continue antibiotic eye drop x 6 additional days. If no improvement in the next 24-48 hours, you need to see an ophthalmologist.

## 2025-05-19 NOTE — ASSESSMENT & PLAN NOTE
Reported left eye pain today  History of glaucoma, on latanoprost  Eye pressure measured at 8 mmHg  With some discharge noted, start antibiotic eyedrops  Ophtho follow up if no improvement

## 2025-05-19 NOTE — DISCHARGE SUMMARY
Discharge Summary - Hospitalist   Name: Mattie Varela 80 y.o. female I MRN: [id number]  Unit/Bed#: E5 -01 I Date of Admission: 5/14/2025   Date of Service: 5/19/2025 I Hospital Day: 4     Assessment & Plan  Fall  80-year-old female who presents from [facility] assisted living facility reportedly had a fall  Per patient's report, suspect likely mechanical fall with no preceding symptoms  Had left lower leg laceration sutured by podiatry resident  Denies any loss of consciousness, head trauma  PT and OT to further evaluate as patient does report gait unsteadiness. PT recommending level II rehab services. Discharge back to [facility].  At baseline uses rolling walker  Hypothyroidism  Continue levothyroxine 37.5mcg daily  Chronic obstructive pulmonary disease, unspecified COPD type (HCC)  Albuterol as needed, not in any distress  Abnormal urinalysis  Previous urine cultures growing ESBL Klebsiella, report anaphylaxis to penicillins  Given 1 dose of ertapenem 5/14  Urine culture now reporting-   >100,000 cfu/ml Klebsiella pneumoniae Abnormal    20,000-29,000 cfu/ml Pseudomonas aeruginosa Abnormal    >100,000 cfu/ml Enterococcus faecalis Abnormal    Not true infection, colonization  Urinary retention  Chronic Bateman catheter  Chronic diastolic congestive heart failure (HCC)  Wt Readings from Last 3 Encounters:   05/15/25 78.6 kg (173 lb 4.5 oz)   04/23/25 78.6 kg (173 lb 4.5 oz)   04/11/25 79.8 kg (176 lb)     Chest x-ray reports- Low lung volumes with bibasilar atelectasis on the lateral projection   Was on room air but currently on 2L oxygen  Torsemide was held as UA did show hyaline casts indicating mild dehydration  Resumed on torsemide 40 mg BID  Gastro-esophageal reflux disease without esophagitis  Continue Pepcid  Complains of heartburn today- added PRN tums  SIRS (systemic inflammatory response syndrome) ([facility])  Met SIRS criteria on admission, with tachycardia, elevated white count  Episode of hypotension that  resolved with IV fluids  Suspect white count is likely reactive in setting of fall and laceration  WBC today 8.5  Suspected urine colonization, monitoring off antibiotics  Iron deficiency anemia secondary to inadequate dietary iron intake  Hgb 8.5  Continue iron supplementation  Receiving 1 unit PRBC  Hypokalemia  K 3.3 today, minimally improved  Continue PO supplementation - additional 40mE ordered today  CKD (chronic kidney disease)  Lab Results   Component Value Date    EGFR 35 05/19/2025    EGFR 37 05/18/2025    EGFR 42 05/17/2025    CREATININE 1.37 (H) 05/19/2025    CREATININE 1.33 (H) 05/18/2025    CREATININE 1.19 05/17/2025     Creat today appears to be around baseline  Torsemide resumed 40 mg twice daily  Chronic pain  With history of chronic pain disorder status post spinal stimulator with degenerative lumbar spinal stenosis   Likely contributing to falls  Home pain med regimen included fentanyl patch, PO morphine, gabapentin, scheduled robaxin  BP runs low at times, likely due to narcotics and decreased PO intake  Geriatrics following  Sepsis ([facility])  Sepsis present on admission due to possible UTI a/e/b WBC 14.02,  and lactic acid 2.2 treated with IV Ertapenem, IVF, urine culture, lab monitoring and VS monitoring   Bacteriuria  UTI possibly due to indwelling urethral catheter, present on admission a/e/b chronic ahn catheter and urine culture treated with IV IV Ertapenem, UA, urine culture, lab monitoring and VS monitoring   Urine culture now reporting-               >100,000 cfu/ml Klebsiella pneumoniae Abnormal               20,000-29,000 cfu/ml Pseudomonas aeruginosa Abnormal               >100,000 cfu/ml Enterococcus faecalis Abnormal  Suspect colonization, monitoring off antibiotics  Eye pain, left  Reported left eye pain today  History of glaucoma, on latanoprost  Eye pressure measured at 8 mmHg  With some discharge noted, start antibiotic eyedrops  Ophtho follow up if no improvement      Medical Problems       Resolved Problems  Date Reviewed: 4/25/2025   None       Discharging Physician / Practitioner: Naveen Ramon PA-C  PCP: Halley Clark MD  Admission Date:   Admission Orders (From admission, onward)       Ordered        05/15/25 1400  INPATIENT ADMISSION  Once            05/14/25 1235  Place in Observation  Once                          Discharge Date: 05/19/25    Next Steps for Physician/AP Assuming Care:  Monitor left eye pain, follow-up with ophthalmology if no improvement  BMP in 1 week to monitor renal function    Test Results Pending at Discharge (will require follow up):  None    Medication Changes for Discharge & Rationale:   Antibiotic eyedrops  See after visit summary for reconciled discharge medications provided to patient and/or family.     Consultations During Hospital Stay:  None    Procedures Performed:   Left ocular pressure - 8mmHg  1 unit PRBC transfusion for hemoglobin 6.9    Significant Findings / Test Results:   XR spine lumbar 2 or 3 views injury  Result Date: 5/19/2025  Impression: No evidence of acute osseous abnormality. Posterior fusion hardware spanning T12-L5 appears intact. Computerized Assisted Algorithm (CAA) may have been used to analyze all applicable images. Workstation performed: [id number]     XR chest 2 views  Result Date: 5/14/2025  Impression: Low lung volumes with bibasilar atelectasis on the lateral projection. Workstation performed: [id number]         Incidental Findings:   None    Hospital Course:   Mattie Varela is a 80 y.o. female patient with.  Obesity, hypothyroidism, COPD, heart failure, GERD, chronic pain who originally presented to the hospital on 5/14/2025 due to fall.  She resides at adult assisted living, typically ambulating with a rolling walker, reported that her walker was falling apart and led to her having a fall.  She denied any lightheadedness or dizziness.  She was admitted for PT/OT evaluation.  They do recommend  continued physical and Occupational Therapy, however can return to adult living facility.  While in the hospital, she was noted to have a hemoglobin of 6.9 for which she received 1 unit PRBC.  Iron panel was consistent with iron deficiency anemia and she also received IV Venofer.    Lastly, she developed left eye pain with some mild discharge.  She was started on antibiotic eyedrops.  Continue to monitor in the outpatient setting, ophthalmology follow-up if no improvement.      Please see above list of diagnoses and related plan for additional information.     Discharge Day Visit / Exam:   Subjective: No acute events overnight.  Patient reports feeling better.  She does continue to have some left eye pain, although does state that this is slightly improved.  She did note some discharge this morning as well.  Vitals: Blood Pressure: 97/58 (05/19/25 0821)  Pulse: 105 (05/19/25 0821)  Temperature: 98 °F (36.7 °C) (05/19/25 0821)  Temp Source: Oral (05/19/25 0821)  Respirations: 16 (05/19/25 0821)  Height: 5' (152.4 cm) (05/15/25 2304)  Weight - Scale: 78.6 kg (173 lb 4.5 oz) (05/15/25 2304)  SpO2: 93 % (05/19/25 0821)  Physical Exam  Vitals and nursing note reviewed.   Constitutional:       Appearance: Normal appearance.   HENT:      Head: Normocephalic and atraumatic.      Mouth/Throat:      Mouth: Mucous membranes are moist.      Pharynx: Oropharynx is clear. No oropharyngeal exudate.     Eyes:      Extraocular Movements: Extraocular movements intact.       Cardiovascular:      Rate and Rhythm: Normal rate and regular rhythm.      Pulses: Normal pulses.      Heart sounds: Normal heart sounds. No murmur heard.     No friction rub. No gallop.   Pulmonary:      Effort: Pulmonary effort is normal. No respiratory distress.      Breath sounds: Normal breath sounds. No stridor. No wheezing or rales.   Abdominal:      General: Abdomen is flat. Bowel sounds are normal. There is no distension.      Palpations: Abdomen is soft.       Tenderness: There is no abdominal tenderness.     Musculoskeletal:      Right lower leg: No edema.      Left lower leg: No edema.     Skin:     General: Skin is warm and dry.     Neurological:      General: No focal deficit present.      Mental Status: She is alert and oriented to person, place, and time.          Discussion with Family: Attempted to update  (daughter) via phone. Left voicemail.     Discharge instructions/Information to patient and family:   See after visit summary for information provided to patient and family.      Provisions for Follow-Up Care:  See after visit summary for information related to follow-up care and any pertinent home health orders.      Mobility at time of Discharge:   Basic Mobility Inpatient Raw Score: 20  JH-HLM Goal: 6: Walk 10 steps or more  JH-HLM Achieved: 6: Walk 10 steps or more  HLM Goal achieved. Continue to encourage appropriate mobility.     Disposition:   Assisted Living Facility at [facility]    Planned Readmission: None    Administrative Statements   Discharge Statement:  I have spent a total time of 45 minutes in caring for this patient on the day of the visit/encounter. .    **Please Note: This note may have been constructed using a voice recognition system**

## 2025-05-19 NOTE — ASSESSMENT & PLAN NOTE
80-year-old female who presents from [facility] assisted living facility reportedly had a fall  Per patient's report, suspect likely mechanical fall with no preceding symptoms  Had left lower leg laceration sutured by podiatry resident  Denies any loss of consciousness, head trauma  PT and OT to further evaluate as patient does report gait unsteadiness. PT recommending level II rehab services. Discharge back to [facility].  At baseline uses rolling walker

## 2025-05-19 NOTE — OCCUPATIONAL THERAPY NOTE
Occupational Therapy Progress Note     Patient Name: Mattie Varela  Today's Date: 5/19/2025  Problem List  Principal Problem:    Fall  Active Problems:    Iron deficiency anemia secondary to inadequate dietary iron intake    Hypothyroidism    Sepsis (HCC)    Chronic obstructive pulmonary disease, unspecified COPD type (HCC)    Abnormal urinalysis    Urinary retention    Chronic diastolic congestive heart failure (HCC)    Hypokalemia    Gastro-esophageal reflux disease without esophagitis    SIRS (systemic inflammatory response syndrome) (HCC)    CKD (chronic kidney disease)    Chronic pain    Bacteriuria    Eye pain, left            05/19/25 0916   OT Last Visit   OT Visit Date 05/19/25   Note Type   Note Type Treatment   Pain Assessment   Pain Assessment Tool 0-10   Pain Score No Pain   Restrictions/Precautions   Weight Bearing Precautions Per Order No   Other Precautions Chair Alarm;Bed Alarm;Fall Risk;Pain   ADL   Where Assessed Chair   Eating Assistance 6  Modified independent   Grooming Assistance 5  Supervision/Setup   Grooming Deficit Teeth care;Brushing hair   UB Bathing Assistance 6  Modified Independent   UB Bathing Deficit Setup;Verbal cueing;Supervision/safety;Increased time to complete   LB Bathing Assistance 4  Minimal Assistance   LB Bathing Deficit Setup;Verbal cueing;Supervision/safety;Increased time to complete   UB Dressing Assistance 5  Supervision/Setup   UB Dressing Deficit Setup;Verbal cueing;Supervision/safety;Increased time to complete   LB Dressing Assistance 4  Minimal Assistance   LB Dressing Deficit Setup;Verbal cueing;Supervision/safety;Increased time to complete   Functional Standing Tolerance   Time 2-4 min   Activity self care tasks, mobility.  RW for support.   Transfers   Sit to Stand 5  Supervision   Stand to Sit 5  Supervision   Additional Comments cues for hand placement   Functional Mobility   Functional Mobility 5  Supervision   Additional Comments Ax1, RW. short  functional distances in room   Additional items Rolling walker   Cognition   Overall Cognitive Status WFL   Arousal/Participation Cooperative   Attention Attends with cues to redirect   Orientation Level Oriented X4   Memory Within functional limits   Following Commands Follows all commands and directions without difficulty   Activity Tolerance   Activity Tolerance Patient tolerated treatment well   Medical Staff Made Aware RN   Assessment   Assessment Pt seen for skilled OT tx this date. Tx focused on improving strength, activity tolerance and balance, safety awareness to increase independence with self care tasks. Pt tolerated session well. Pt was limited by weakness. Greeted in chair and agreeable. Pt performed UB dressing/ bathing with supervision, LB dressing / bathing Mynor , supervision transfers , mobility with RW supervision. Pt demonstrated fair- standing balance during functional tasks .Pt demonstrated ability to safely and appropriately attend to all tasks during session. Pt required minimal verbal cuing during session to safely complete tasks. Current OT DC recommendations for pt is level 3 resources.   Plan   Treatment Interventions ADL retraining;Functional transfer training;UE strengthening/ROM;Endurance training;Cognitive reorientation;Patient/family training;Equipment evaluation/education;Compensatory technique education;Continued evaluation;Activityengagement;Energy conservation   Goal Expiration Date 05/29/25   OT Treatment Day 2   OT Frequency 3-5x/wk   Discharge Recommendation   Rehab Resource Intensity Level, OT III (Minimum Resource Intensity)   AM-PAC Daily Activity Inpatient   Lower Body Dressing 3   Bathing 3   Toileting 3   Upper Body Dressing 3   Grooming 4   Eating 4   Daily Activity Raw Score 20   Daily Activity Standardized Score (Calc for Raw Score >=11) 42.03   AM-PAC Applied Cognition Inpatient   Following a Speech/Presentation 4   Understanding Ordinary Conversation 4   Taking  Medications 3   Remembering Where Things Are Placed or Put Away 3   Remembering List of 4-5 Errands 3   Taking Care of Complicated Tasks 3   Applied Cognition Raw Score 20   Applied Cognition Standardized Score 41.76     Miri Bunn, OT

## 2025-05-19 NOTE — UTILIZATION REVIEW
NOTIFICATION OF ADMISSION DISCHARGE   This is a Notification of Discharge from [facility]. Please be advised that this patient has been discharge from our facility. Below you will find the admission and discharge date and time including the patient’s disposition.   UTILIZATION REVIEW CONTACT:  Utilization Review Assistants  Network Utilization Review Department  Phone: [phone] x carefully listen to the prompts. All voicemails are confidential.  Email: [email or web address]     ADMISSION INFORMATION  PRESENTATION DATE: 5/14/2025  7:32 AM  OBERVATION ADMISSION DATE: 05/14/2025 1235  INPATIENT ADMISSION DATE: 5/15/25  2:01 PM   DISCHARGE DATE: 5/19/2025 12:13 PM   DISPOSITION:Home/Self Care    Network Utilization Review Department  ATTENTION: Please call with any questions or concerns to [phone] and carefully listen to the prompts so that you are directed to the right person. All voicemails are confidential.   For Discharge needs, contact Care Management DC Support Team at [phone] opt. 2  Send all requests for admission clinical reviews, approved or denied determinations and any other requests to dedicated fax number below belonging to the campus where the patient is receiving treatment. List of dedicated fax numbers for the Facilities:  FACILITY NAME UR FAX NUMBER   ADMISSION DENIALS (Administrative/Medical Necessity) [phone]   DISCHARGE SUPPORT TEAM ([facility]) [phone]   PARENT CHILD HEALTH (Maternity/NICU/Pediatrics) [phone]   [facility] [phone]   [facility] [phone]   [facility] [phone]   [facility] [phone]   [facility] [phone]   [facility] [phone]   [facility] [phone]   [facility] [phone]    [facility] [phone]   [facility] [phone]   [facility] [phone]   [facility] [phone]

## 2025-05-19 NOTE — NURSING NOTE
Discharge home with [facility] and lab work orders. Patient verbalized understanding of eye drop administration.

## 2025-05-19 NOTE — CASE MANAGEMENT
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 (CODE) ([facility]) 2025    Multifocal atrial tachycardia ([facility]) 2025    Hyponatremia 2025    Hypokalemia 2024    Gastro-esophageal reflux disease without esophagitis 10/19/2024    SADAF (acute kidney injury) ([facility]) 10/18/2024    Constipation 2024    Chronic diastolic congestive heart failure ([facility]) 2024    s/p Medtronic loop recorder 3/2/2024 2024    Moderate protein-calorie malnutrition ([facility]) 2024    Left ventricular outflow obstruction 2024    Obesity, Class I, BMI 30-34.9 2024    Urinary retention 2023    Non obstructive CAD 11/15/2023    Abnormal urinalysis 2023    Chronic obstructive pulmonary disease, unspecified COPD type ([facility]) 2023    Anemia in stage 3b chronic kidney disease  ([facility]) 2023    Cerebrovascular accident (CVA), unspecified mechanism ([facility]) 2022    Sepsis ([facility]) 2022    Prepyloric ulcer 2021    Mild intermittent asthma without complication 2020    S/P insertion of spinal cord stimulator 2018    Iron deficiency anemia secondary to inadequate dietary iron intake 2018    Type 2 diabetes mellitus with other skin complications ([facility])     Hypothyroidism 2017    Hypercholesterolemia 2016    Anxiety 2015    Other chronic pain 2013    Hypertension 2013      LOS (days):  4  Geometric Mean LOS (GMLOS) (days): 4.8  Days to GMLOS:0.9     OBJECTIVE:  Risk of Unplanned Readmission Score: 46.93         Current admission status: Inpatient   Preferred Pharmacy:   [facility] - [place], [place] - [address]  [address]  [address]  [place] [address]  Phone: [phone] Fax: [phone]    [facility] [place], [place] - [address]  [facility],  [address] [facility],  First Floor [place]  [facility] [address]  Phone: [phone] Fax: [phone]    [facility] - [place], [place] - [address]  [address]  [place] [address]  Phone: [phone] Fax: [phone]    Primary Care Provider: Halley Clark MD    Primary Insurance: [facility]  Secondary Insurance:     DISCHARGE DETAILS:    Discharge planning discussed with:: pt and dtr  Freedom of Choice: Yes     CM contacted family/caregiver?: Yes  Were Treatment Team discharge recommendations reviewed with patient/caregiver?: Yes  Did patient/caregiver verbalize understanding of patient care needs?: Yes  Were patient/caregiver advised of the risks associated with not following Treatment Team discharge recommendations?: Yes    Contacts  Patient Contacts: Venice Baires, Daughter  Relationship to Patient:: Family  Contact Method: Phone  Phone Number: [phone]  Reason/Outcome: Emergency Contact, Continuity of Care, Discharge Planning    Requested Home Health Care         Is the patient interested in HHC at discharge?: Yes  Home Health Discipline requested:: Nursing, Occupational Therapy, Physical Therapy  Home Health Agency Name:: Other ([facility])  HHA External Referral Reason (only applicable if external HHA name selected): Patient has established relationship with provider  Home Health Follow-Up Provider:: PCP  Home Health Services Needed:: Evaluate Functional Status and Safety, Strengthening/Theraputic Exercises to Improve Function, Gait/ADL Training  Homebound Criteria Met::  Requires the Assistance of Another Person for Safe Ambulation or to Leave the Home, Uses an Assist Device (i.e. cane, walker, etc)  Supporting Clincal Findings:: Limited Endurance, Fatigues Easliy in Short Distances    DME Referral Provided  Referral made for DME?: No    Treatment Team Recommendation: Assisted Living, Home with Home Health Care  Discharge Destination Plan:: Home with Home Health Care, Assisted Living  Transport at Discharge : Wheelchair van     Number/Name of Dispatcher: [employer]  Transported by (Company and Unit #): [employer]  ETA of Transport (Date): 05/19/25  ETA of Transport (Time): 1200     IMM Given (Date):: 05/19/25  IMM Given to:: Patient  Family notified:: Reviewed with pt. She is in agreement with dc. COpy placed in bin to be scanned.     CM spoke with Catrachita at [employer]  at [phone] ext. [phone] regarding dishcarge and faxed AVS to [phone] and Antonella at [facility] at [phone] and faxed AVS to [phone]. Pt, SLIM provider, pt's sister, bedside RN adn the previously mentioned parties are aware of  time. [employer] set up on roundtrip to transport pt via wc van at 12 pm back to [facility].

## 2025-05-19 NOTE — PLAN OF CARE
Problem: INFECTION - ADULT  Goal: Absence or prevention of progression during hospitalization  Description: INTERVENTIONS:  - Assess and monitor for signs and symptoms of infection  - Monitor lab/diagnostic results  - Monitor all insertion sites, i.e. indwelling lines, tubes, and drains  - Monitor endotracheal if appropriate and nasal secretions for changes in amount and color  - [place] appropriate cooling/warming therapies per order  - Administer medications as ordered  - Instruct and encourage patient and family to use good hand hygiene technique  - Identify and instruct in appropriate isolation precautions for identified infection/condition  Outcome: Progressing  Goal: Absence of fever/infection during neutropenic period  Description: INTERVENTIONS:  - Monitor WBC  - Perform strict hand hygiene  - Limit to healthy visitors only  - No plants, dried, fresh or silk flowers with burns in patient room  Outcome: Progressing     Problem: DISCHARGE PLANNING  Goal: Discharge to home or other facility with appropriate resources  Description: INTERVENTIONS:  - Identify barriers to discharge w/patient and caregiver  - Arrange for needed discharge resources and transportation as appropriate  - Identify discharge learning needs (meds, wound care, etc.)  - Arrange for interpretive services to assist at discharge as needed  - Refer to Case Management Department for coordinating discharge planning if the patient needs post-hospital services based on physician/advanced practitioner order or complex needs related to functional status, cognitive ability, or social support system  Outcome: Progressing     Problem: SKIN/TISSUE INTEGRITY - ADULT  Goal: Incision(s), wounds(s) or drain site(s) healing without S/S of infection  Description: INTERVENTIONS  - Assess and document dressing, incision, wound bed, drain sites and surrounding tissue  - Provide patient and family education  - Perform skin care/dressing changes every  shift    Outcome: Progressing     Problem: MUSCULOSKELETAL - ADULT  Goal: Maintain or return mobility to safest level of function  Description: INTERVENTIONS:  - Assess patient's ability to carry out ADLs; assess patient's baseline for ADL function and identify physical deficits which impact ability to perform ADLs (bathing, care of mouth/teeth, toileting, grooming, dressing, etc.)  - Assess/evaluate cause of self-care deficits   - Assess range of motion  - Assess patient's mobility  - Assess patient's need for assistive devices and provide as appropriate  - Encourage maximum independence but intervene and supervise when necessary  - Involve family in performance of ADLs  - Assess for home care needs following discharge   - Consider OT consult to assist with ADL evaluation and planning for discharge  - Provide patient education as appropriate  Outcome: Progressing

## 2025-05-19 NOTE — PLAN OF CARE
Problem: PAIN - ADULT  Goal: Verbalizes/displays adequate comfort level or baseline comfort level  Description: Interventions:  - Encourage patient to monitor pain and request assistance  - Assess pain using appropriate pain scale  - Administer analgesics as ordered based on type and severity of pain and evaluate response  - Implement non-pharmacological measures as appropriate and evaluate response  - Consider cultural and social influences on pain and pain management  - Notify physician/advanced practitioner if interventions unsuccessful or patient reports new pain  - Educate patient/family on pain management process including their role and importance of  reporting pain   - Provide non-pharmacologic/complimentary pain relief interventions  Outcome: Progressing     Problem: INFECTION - ADULT  Goal: Absence or prevention of progression during hospitalization  Description: INTERVENTIONS:  - Assess and monitor for signs and symptoms of infection  - Monitor lab/diagnostic results  - Monitor all insertion sites, i.e. indwelling lines, tubes, and drains  - Monitor endotracheal if appropriate and nasal secretions for changes in amount and color  - [place] appropriate cooling/warming therapies per order  - Administer medications as ordered  - Instruct and encourage patient and family to use good hand hygiene technique  - Identify and instruct in appropriate isolation precautions for identified infection/condition  Outcome: Progressing  Goal: Absence of fever/infection during neutropenic period  Description: INTERVENTIONS:  - Monitor WBC  - Perform strict hand hygiene  - Limit to healthy visitors only  - No plants, dried, fresh or silk flowers with burns in patient room  Outcome: Progressing     Problem: SAFETY ADULT  Goal: Patient will remain free of falls  Description: INTERVENTIONS:  - Educate patient/family on patient safety including physical limitations  - Instruct patient to call for assistance with activity   -  Consider consulting OT/PT to assist with strengthening/mobility based on AM PAC & JH-HLM score  - Consult OT/PT to assist with strengthening/mobility   - Keep Call bell within reach  - Keep bed low and locked with side rails adjusted as appropriate  - Keep care items and personal belongings within reach  - Initiate and maintain comfort rounds  - Make Fall Risk Sign visible to staff  - Consider moving patient to room near nurses station  Outcome: Progressing  Goal: Maintain or return to baseline ADL function  Description: INTERVENTIONS:  -  Assess patient's ability to carry out ADLs; assess patient's baseline for ADL function and identify physical deficits which impact ability to perform ADLs (bathing, care of mouth/teeth, toileting, grooming, dressing, etc.)  - Assess/evaluate cause of self-care deficits   - Assess range of motion  - Assess patient's mobility; develop plan if impaired  - Assess patient's need for assistive devices and provide as appropriate  - Encourage maximum independence but intervene and supervise when necessary  - Involve family in performance of ADLs  - Assess for home care needs following discharge   - Consider OT consult to assist with ADL evaluation and planning for discharge  - Provide patient education as appropriate  - Monitor functional capacity and physical performance, use of AM PAC & JH-HLM   - Monitor gait, balance and fatigue with ambulation    Outcome: Progressing  Goal: Maintains/Returns to pre admission functional level  Description: INTERVENTIONS:  - Perform AM-PAC 6 Click Basic Mobility/ Daily Activity assessment daily.  - Set and communicate daily mobility goal to care team and patient/family/caregiver.   - Collaborate with rehabilitation services on mobility goals if consulted  - Out of bed for toileting  - Record patient progress and toleration of activity level   Outcome: Progressing     Problem: DISCHARGE PLANNING  Goal: Discharge to home or other facility with  appropriate resources  Description: INTERVENTIONS:  - Identify barriers to discharge w/patient and caregiver  - Arrange for needed discharge resources and transportation as appropriate  - Identify discharge learning needs (meds, wound care, etc.)  - Arrange for interpretive services to assist at discharge as needed  - Refer to Case Management Department for coordinating discharge planning if the patient needs post-hospital services based on physician/advanced practitioner order or complex needs related to functional status, cognitive ability, or social support system  Outcome: Progressing     Problem: Knowledge Deficit  Goal: Patient/family/caregiver demonstrates understanding of disease process, treatment plan, medications, and discharge instructions  Description: Complete learning assessment and assess knowledge base.  Interventions:  - Provide teaching at level of understanding  - Provide teaching via preferred learning methods  Outcome: Progressing     Problem: SKIN/TISSUE INTEGRITY - ADULT  Goal: Incision(s), wounds(s) or drain site(s) healing without S/S of infection  Description: INTERVENTIONS  - Assess and document dressing, incision, wound bed, drain sites and surrounding tissue  - Provide patient and family education  - Perform skin care/dressing changes every shift    Outcome: Progressing     Problem: MUSCULOSKELETAL - ADULT  Goal: Maintain or return mobility to safest level of function  Description: INTERVENTIONS:  - Assess patient's ability to carry out ADLs; assess patient's baseline for ADL function and identify physical deficits which impact ability to perform ADLs (bathing, care of mouth/teeth, toileting, grooming, dressing, etc.)  - Assess/evaluate cause of self-care deficits   - Assess range of motion  - Assess patient's mobility  - Assess patient's need for assistive devices and provide as appropriate  - Encourage maximum independence but intervene and supervise when necessary  - Involve family  in performance of ADLs  - Assess for home care needs following discharge   - Consider OT consult to assist with ADL evaluation and planning for discharge  - Provide patient education as appropriate  Outcome: Progressing

## 2025-05-19 NOTE — ASSESSMENT & PLAN NOTE
Previous urine cultures growing ESBL Klebsiella, report anaphylaxis to penicillins  Given 1 dose of ertapenem 5/14  Urine culture now reporting-   >100,000 cfu/ml Klebsiella pneumoniae Abnormal    20,000-29,000 cfu/ml Pseudomonas aeruginosa Abnormal    >100,000 cfu/ml Enterococcus faecalis Abnormal    Not true infection, colonization

## 2025-05-19 NOTE — ASSESSMENT & PLAN NOTE
Lab Results   Component Value Date    EGFR 35 05/19/2025    EGFR 37 05/18/2025    EGFR 42 05/17/2025    CREATININE 1.37 (H) 05/19/2025    CREATININE 1.33 (H) 05/18/2025    CREATININE 1.19 05/17/2025     Creat today appears to be around baseline  Torsemide resumed 40 mg twice daily

## 2025-06-06 ENCOUNTER — PROCEDURE VISIT (OUTPATIENT)
Dept: UROLOGY | Facility: CLINIC | Age: 80
End: 2025-06-06
Payer: MEDICARE

## 2025-06-06 ENCOUNTER — TELEPHONE (OUTPATIENT)
Dept: UROLOGY | Facility: CLINIC | Age: 80
End: 2025-06-06

## 2025-06-06 VITALS — DIASTOLIC BLOOD PRESSURE: 66 MMHG | SYSTOLIC BLOOD PRESSURE: 120 MMHG | HEART RATE: 101 BPM | OXYGEN SATURATION: 93 %

## 2025-06-06 DIAGNOSIS — R33.9 URINARY RETENTION: ICD-10-CM

## 2025-06-06 DIAGNOSIS — R39.9 UTI SYMPTOMS: Primary | ICD-10-CM

## 2025-06-06 LAB
AMORPH URATE CRY URNS QL MICRO: ABNORMAL
BACTERIA UR QL AUTO: ABNORMAL /HPF
BILIRUB UR QL STRIP: NEGATIVE
CLARITY UR: CLEAR
COLOR UR: COLORLESS
GLUCOSE UR STRIP-MCNC: NEGATIVE MG/DL
HGB UR QL STRIP.AUTO: NEGATIVE
KETONES UR STRIP-MCNC: NEGATIVE MG/DL
LEUKOCYTE ESTERASE UR QL STRIP: ABNORMAL
NITRITE UR QL STRIP: NEGATIVE
NON-SQ EPI CELLS URNS QL MICRO: ABNORMAL /HPF
PH UR STRIP.AUTO: 6 [PH]
PROT UR STRIP-MCNC: NEGATIVE MG/DL
RBC #/AREA URNS AUTO: ABNORMAL /HPF
SP GR UR STRIP.AUTO: 1 (ref 1–1.03)
UROBILINOGEN UR STRIP-ACNC: <2 MG/DL
WBC #/AREA URNS AUTO: ABNORMAL /HPF

## 2025-06-06 PROCEDURE — 81001 URINALYSIS AUTO W/SCOPE: CPT

## 2025-06-06 PROCEDURE — 87086 URINE CULTURE/COLONY COUNT: CPT

## 2025-06-06 PROCEDURE — 51702 INSERT TEMP BLADDER CATH: CPT

## 2025-06-06 NOTE — PROGRESS NOTES
"6/6/2025  Mattie Varela is a 80 y.o. female  [id number]    Diagnosis:      Patient presents for routine ahn catheter change managed by our office    Plan:  Patient will return as scheduled for next change  Patient instructed to call with any questions or concerns in the meantime.    Orders Placed This Encounter   Procedures    Urine culture    Urinalysis with microscopic        Vitals:    06/06/25 1201   BP: 120/66   BP Location: Left arm   Patient Position: Sitting   Cuff Size: Adult   Pulse: 101   SpO2: 93%           Procedure:      Universal Protocol:  procedure performed by consultantConsent: Verbal consent obtained  Risks and benefits: risks, benefits and alternatives were discussed  Consent given by: patient  Time out: Immediately prior to procedure a \"time out\" was called to verify the correct patient, procedure, equipment, support staff and site/side marked as required.  Patient understanding: patient states understanding of the procedure being performed  Patient consent: the patient's understanding of the procedure matches consent given  Patient identity confirmed: verbally with patient    Bladder catheterization    Date/Time: 6/6/2025 11:30 AM    Performed by: Mahnaz Byrd RN  Authorized by: Ryan Zuluaga DO    Patient location:  Bedside  Consent:     Consent given by:  Patient  Universal protocol:     Procedure explained and questions answered to patient or proxy's satisfaction: yes      Immediately prior to procedure a time out was called: yes      Patient identity confirmed:  Verbally with patient  Pre-procedure details:     Procedure purpose:  Therapeutic    Preparation: Patient was prepped and draped in usual sterile fashion    Anesthesia (see MAR for exact dosages):     Anesthesia method:  None  Procedure details:     Bladder irrigation: no      Catheter insertion:  Indwelling    Approach: natural orifice      Catheter type:  Latex and Ahn    Catheter size:  16 Fr    Number of " attempts:  1    Successful placement: yes      Urine characteristics:  Foul smelling and cloudy  Post-procedure details:     Patient tolerance of procedure:  Tolerated well, no immediate complications  Comments:       Ahn removed after deflation of intact balloon. New ahn inserted with no issues. Flushed for return. 10 mL of sterile water inflated into balloon. Attached to leg bag and stat lock. Extra supplies provided per pt request.     Patient complaining of foul urine smell, pain and bladder spasms. Urine sample collected during visit.           Mahnaz Byrd RN

## 2025-06-07 LAB — BACTERIA UR CULT: NORMAL

## 2025-06-09 ENCOUNTER — RESULTS FOLLOW-UP (OUTPATIENT)
Dept: UROLOGY | Facility: CLINIC | Age: 80
End: 2025-06-09

## 2025-06-10 NOTE — TELEPHONE ENCOUNTER
Spoke directly with the patient and advised of provider's note and results. Patient thanked the office for the call.         ----- Message from NANY Acosta sent at 6/9/2025  7:58 AM EDT -----  UC negative for infection        ----- Message -----  From: Lab, Background User  Sent: 6/6/2025   6:25 PM EDT  To: NANY Day

## 2025-06-11 NOTE — PROGRESS NOTES
Cardiology Follow Up    [facility] [address] [address]  PHONE: [phone]  FAX: [phone]    Mattie Varela  1945  [id number]    Assessment/Plan:  Chronic HFpEF, LVEF 51%, AHA stage D  Hyponatremia - resolved  CKD stage III  Urinary retention with Bateman catheter  Spinal stimulator  Anemia of chronic disease  Type 2 diabetes  COPD  History of patella fracture after a fall in November 2024  Chronic pain disorder with spinal stimulator in place  Chronic opiate use  Glaucoma  Hypothyroidism    She is at dry weight and looks great. Euvolemic.    Continue current medications.     She is walking which is an amazing improvement. Keep it up!    RTO in Nov or December w/ Dr. Isiah Mckeon    Interval History:   Mattie Varela is a 80 y.o. female who presents to the office for continued CHF follow-up.  My last visit with her was April 2025.  Unfortunately she had 2 hospitalizations since then.  The first hospitalization in April was due to dislodged urinary catheter and abdominal distention.  The second hospitalization which was in May was due to a mechanical fall.  She is still living at [facility] with cares from [employer].  Patient reports she is walking more with physical therapy.  Her left leg laceration is healing well.  Pt reports some pressure and DUNBAR with ambulation which resolves with rest.    Physical Exam:  Vitals:    06/17/25 0933   BP: 162/72   Pulse: 101   SpO2: 97%     Vitals:    06/17/25 0933   Weight: 76.9 kg (169 lb 9.6 oz)     Height: 5' (152.4 cm) Body mass index is 33.12 kg/m².    Physical Exam  Vitals and nursing note reviewed.   HENT:      Head: Normocephalic and atraumatic.      Nose: No congestion or rhinorrhea.      Mouth/Throat:      Mouth: Mucous membranes are moist.     Eyes:      Conjunctiva/sclera: Conjunctivae normal.     Neck:      Comments: - JVD  Cardiovascular:      Rate and Rhythm: Normal rate and regular rhythm.       Heart sounds: S1 normal and S2 normal. Murmur heard.      Crescendo decrescendo systolic murmur is present with a grade of 1/6.   Pulmonary:      Breath sounds: No wheezing, rhonchi or rales.   Abdominal:      General: Abdomen is flat.     Skin:     General: Skin is warm and dry.      Comments: I did not inspect the left leg lac but the dressing is c/d/I    The bl le have erythema which is light color and not warm    Pvd skin changes bl le     Neurological:      General: No focal deficit present.      Mental Status: She is alert.     Psychiatric:         Behavior: Behavior normal.     6/12/25 device check report reviewed    Kylie Sommers PA-C  [facility]

## 2025-06-12 ENCOUNTER — RESULTS FOLLOW-UP (OUTPATIENT)
Dept: CARDIOLOGY CLINIC | Facility: CLINIC | Age: 80
End: 2025-06-12

## 2025-06-12 ENCOUNTER — REMOTE DEVICE CLINIC VISIT (OUTPATIENT)
Dept: CARDIOLOGY CLINIC | Facility: CLINIC | Age: 80
End: 2025-06-12
Payer: MEDICARE

## 2025-06-12 DIAGNOSIS — I63.9 CRYPTOGENIC STROKE (HCC): Primary | ICD-10-CM

## 2025-06-12 PROCEDURE — 93298 REM INTERROG DEV EVAL SCRMS: CPT | Performed by: INTERNAL MEDICINE

## 2025-06-12 NOTE — PROGRESS NOTES
"MDT LNQ22 ICM - ACTIVE SYSTEM IS MRI CONDITIONAL   CARELINK TRANSMISSION: LOOP RECORDER. PRESENTING RHYTHM NSR @ 98 BPM. BATTERY STATUS \"OK.\" NO PATIENT OR DEVICE ACTIVATED EPISODES. NORMAL DEVICE FUNCTION. DL   "

## 2025-06-14 PROBLEM — A41.9 SEPSIS (HCC): Status: RESOLVED | Noted: 2022-06-18 | Resolved: 2025-06-14

## 2025-06-17 ENCOUNTER — OFFICE VISIT (OUTPATIENT)
Dept: CARDIOLOGY CLINIC | Facility: CLINIC | Age: 80
End: 2025-06-17
Payer: MEDICARE

## 2025-06-17 VITALS
WEIGHT: 169.6 LBS | SYSTOLIC BLOOD PRESSURE: 130 MMHG | HEIGHT: 60 IN | DIASTOLIC BLOOD PRESSURE: 90 MMHG | OXYGEN SATURATION: 97 % | BODY MASS INDEX: 33.3 KG/M2 | HEART RATE: 101 BPM

## 2025-06-17 DIAGNOSIS — K25.3 ACUTE PREPYLORIC ULCER: ICD-10-CM

## 2025-06-17 DIAGNOSIS — I50.32 CHRONIC DIASTOLIC CONGESTIVE HEART FAILURE (HCC): Primary | ICD-10-CM

## 2025-06-17 PROCEDURE — 99214 OFFICE O/P EST MOD 30 MIN: CPT | Performed by: PHYSICIAN ASSISTANT

## 2025-06-17 RX ORDER — FAMOTIDINE 20 MG/1
20 TABLET, FILM COATED ORAL 2 TIMES DAILY
Qty: 10 TABLET | Refills: 0 | Status: SHIPPED | OUTPATIENT
Start: 2025-06-17 | End: 2025-06-22

## 2025-06-20 ENCOUNTER — TELEPHONE (OUTPATIENT)
Age: 80
End: 2025-06-20

## 2025-06-20 NOTE — TELEPHONE ENCOUNTER
Patients GI provider:  Dr. Garth Arredondo MD     Number to return call: [phone]     Reason for call: Pt calling for transportation    Scheduled procedure/appointment date if applicable: 7/1 / EGD  / [place]     Pt called requesting us to call [facility] ([phone])  to set up transportation for her.  I explained that we have transportation that we can set up for her.    She uses a walker    Please advise PT

## 2025-06-30 ENCOUNTER — NURSE TRIAGE (OUTPATIENT)
Age: 80
End: 2025-06-30

## 2025-06-30 ENCOUNTER — HOSPITAL ENCOUNTER (EMERGENCY)
Facility: HOSPITAL | Age: 80
Discharge: HOME | End: 2025-07-01
Attending: EMERGENCY MEDICINE
Payer: MEDICARE

## 2025-06-30 DIAGNOSIS — T83.9XXA PROBLEM WITH FOLEY CATHETER, INITIAL ENCOUNTER (HCC): Primary | ICD-10-CM

## 2025-06-30 PROCEDURE — 99283 EMERGENCY DEPT VISIT LOW MDM: CPT | Performed by: EMERGENCY MEDICINE

## 2025-06-30 PROCEDURE — 99283 EMERGENCY DEPT VISIT LOW MDM: CPT

## 2025-07-01 ENCOUNTER — ANESTHESIA EVENT (OUTPATIENT)
Dept: GASTROENTEROLOGY | Facility: HOSPITAL | Age: 80
End: 2025-07-01
Payer: MEDICARE

## 2025-07-01 ENCOUNTER — PREP FOR PROCEDURE (OUTPATIENT)
Dept: GASTROENTEROLOGY | Facility: CLINIC | Age: 80
End: 2025-07-01

## 2025-07-01 ENCOUNTER — TELEPHONE (OUTPATIENT)
Dept: GASTROENTEROLOGY | Facility: CLINIC | Age: 80
End: 2025-07-01

## 2025-07-01 ENCOUNTER — ANESTHESIA (OUTPATIENT)
Dept: GASTROENTEROLOGY | Facility: HOSPITAL | Age: 80
End: 2025-07-01
Payer: MEDICARE

## 2025-07-01 ENCOUNTER — HOSPITAL ENCOUNTER (OUTPATIENT)
Dept: GASTROENTEROLOGY | Facility: HOSPITAL | Age: 80
Setting detail: OUTPATIENT SURGERY
Discharge: HOME | End: 2025-07-01
Attending: INTERNAL MEDICINE | Admitting: INTERNAL MEDICINE
Payer: MEDICARE

## 2025-07-01 VITALS
SYSTOLIC BLOOD PRESSURE: 130 MMHG | RESPIRATION RATE: 16 BRPM | OXYGEN SATURATION: 99 % | HEART RATE: 88 BPM | TEMPERATURE: 98 F | DIASTOLIC BLOOD PRESSURE: 76 MMHG

## 2025-07-01 VITALS
TEMPERATURE: 98.6 F | SYSTOLIC BLOOD PRESSURE: 149 MMHG | RESPIRATION RATE: 16 BRPM | BODY MASS INDEX: 34.24 KG/M2 | WEIGHT: 174.38 LBS | HEIGHT: 60 IN | HEART RATE: 89 BPM | DIASTOLIC BLOOD PRESSURE: 69 MMHG | OXYGEN SATURATION: 97 %

## 2025-07-01 DIAGNOSIS — R93.3 ABNORMAL CT SCAN, STOMACH: ICD-10-CM

## 2025-07-01 DIAGNOSIS — K21.9 CHRONIC GERD: ICD-10-CM

## 2025-07-01 DIAGNOSIS — R14.2 BURPING: ICD-10-CM

## 2025-07-01 DIAGNOSIS — R68.81 EARLY SATIETY: ICD-10-CM

## 2025-07-01 DIAGNOSIS — R68.81 EARLY SATIETY: Primary | ICD-10-CM

## 2025-07-01 LAB — GLUCOSE SERPL-MCNC: 158 MG/DL (ref 65–140)

## 2025-07-01 PROCEDURE — 43235 EGD DIAGNOSTIC BRUSH WASH: CPT | Performed by: INTERNAL MEDICINE

## 2025-07-01 PROCEDURE — 82948 REAGENT STRIP/BLOOD GLUCOSE: CPT

## 2025-07-01 RX ORDER — SODIUM CHLORIDE, SODIUM LACTATE, POTASSIUM CHLORIDE, CALCIUM CHLORIDE 600; 310; 30; 20 MG/100ML; MG/100ML; MG/100ML; MG/100ML
125 INJECTION, SOLUTION INTRAVENOUS CONTINUOUS
OUTPATIENT
Start: 2025-07-01

## 2025-07-01 RX ORDER — PROPOFOL 10 MG/ML
INJECTION, EMULSION INTRAVENOUS AS NEEDED
Status: DISCONTINUED | OUTPATIENT
Start: 2025-07-01 | End: 2025-07-01

## 2025-07-01 RX ORDER — LIDOCAINE HYDROCHLORIDE 10 MG/ML
INJECTION, SOLUTION EPIDURAL; INFILTRATION; INTRACAUDAL; PERINEURAL AS NEEDED
Status: DISCONTINUED | OUTPATIENT
Start: 2025-07-01 | End: 2025-07-01

## 2025-07-01 RX ORDER — SODIUM CHLORIDE, SODIUM LACTATE, POTASSIUM CHLORIDE, CALCIUM CHLORIDE 600; 310; 30; 20 MG/100ML; MG/100ML; MG/100ML; MG/100ML
125 INJECTION, SOLUTION INTRAVENOUS CONTINUOUS
Status: DISCONTINUED | OUTPATIENT
Start: 2025-07-01 | End: 2025-07-05 | Stop reason: HOSPADM

## 2025-07-01 RX ORDER — SODIUM CHLORIDE, SODIUM LACTATE, POTASSIUM CHLORIDE, CALCIUM CHLORIDE 600; 310; 30; 20 MG/100ML; MG/100ML; MG/100ML; MG/100ML
INJECTION, SOLUTION INTRAVENOUS CONTINUOUS PRN
Status: DISCONTINUED | OUTPATIENT
Start: 2025-07-01 | End: 2025-07-01

## 2025-07-01 RX ADMIN — SODIUM CHLORIDE, SODIUM LACTATE, POTASSIUM CHLORIDE, AND CALCIUM CHLORIDE: .6; .31; .03; .02 INJECTION, SOLUTION INTRAVENOUS at 09:55

## 2025-07-01 RX ADMIN — PROPOFOL 50 MG: 10 INJECTION, EMULSION INTRAVENOUS at 09:55

## 2025-07-01 RX ADMIN — LIDOCAINE HYDROCHLORIDE 50 MG: 10 INJECTION, SOLUTION EPIDURAL; INFILTRATION; INTRACAUDAL; PERINEURAL at 09:55

## 2025-07-01 NOTE — TELEPHONE ENCOUNTER
Scheduled date of EGD(as of today):07.28.25  Physician performing EGD:DR STEELE  Location of EGD:  Instructions reviewed with patient by:SENT VIA [employer], MAILED  Clearances: N/A    Lm for pt to call back if she needs to reschedule or if she has any questions

## 2025-07-01 NOTE — ANESTHESIA POSTPROCEDURE EVALUATION
Post-Op Assessment Note    CV Status:  Stable  Pain Score: 0    Pain management: adequate       Mental Status:  Alert and awake   Hydration Status:  Euvolemic   PONV Controlled:  Controlled   Airway Patency:  Patent     Post Op Vitals Reviewed: Yes    No anethesia notable event occurred.    Staff: CRNA           Last Filed PACU Vitals:  Vitals Value Taken Time   Temp 98 °F (36.7 °C) 07/01/25 10:06   Pulse 93 07/01/25 10:06   /65 07/01/25 10:06   Resp 17 07/01/25 10:06   SpO2 98 % 07/01/25 10:06       Modified Yaya:     Vitals Value Taken Time   Activity 1 07/01/25 10:06   Respiration 2 07/01/25 10:06   Circulation 2 07/01/25 10:06   Consciousness 1 07/01/25 10:06   Oxygen Saturation 2 07/01/25 10:06     Modified Yaya Score: 8

## 2025-07-01 NOTE — ANESTHESIA PREPROCEDURE EVALUATION
Procedure:  EGD    Relevant Problems   CARDIO   (+) Chronic diastolic congestive heart failure (HCC)   (+) Hypercholesterolemia   (+) Hypertension   (+) Multifocal atrial tachycardia (HCC)   (+) Non obstructive CAD      ENDO   (+) Hypothyroidism      GI/HEPATIC   (+) Gastric outlet obstruction   (+) Gastro-esophageal reflux disease without esophagitis   (+) Prepyloric ulcer      /RENAL   (+) SADAF (acute kidney injury) (HCC)   (+) CKD (chronic kidney disease)      HEMATOLOGY   (+) Anemia in stage 3b chronic kidney disease  (HCC)   (+) Iron deficiency anemia secondary to inadequate dietary iron intake      NEURO/PSYCH   (+) Anxiety   (+) Cerebrovascular accident (CVA), unspecified mechanism (HCC)   (+) Chronic pain   (+) Other chronic pain      PULMONARY   (+) Chronic obstructive pulmonary disease, unspecified COPD type (HCC)   (+) Mild intermittent asthma without complication      ECHO 11/22/24:        Left Ventricle: Left ventricular cavity size is normal. Wall thickness is normal. The left ventricular ejection fraction is 51%. Systolic function is low normal. The septum appears akinetic to paradoxical.  Endocardial detection was significantly limited. Unable to assess diastolic function. Left atrial filling pressure cannot be estimated.    IVS: There is abnormal septal motion consistent with left bundle branch block.    Mitral Valve: There is moderate annular calcification.    Physical Exam    Airway     Mallampati score: II  TM Distance: >3 FB  Neck ROM: full      Cardiovascular      Dental       Pulmonary      Neurological    She appears awake, alert and oriented x3.      Other Findings  post-pubertal.      Anesthesia Plan  ASA Score- 3     Anesthesia Type- IV sedation with anesthesia with ASA Monitors.         Additional Monitors:     Airway Plan:            Plan Factors-    Chart reviewed.    Patient summary reviewed.                  Induction- intravenous.    Postoperative Plan- .   Monitoring Plan -  Monitoring plan - standard ASA monitoring      Perioperative Resuscitation Plan - Level 1 - Full Code.       Informed Consent- Anesthetic plan and risks discussed with patient.  I personally reviewed this patient with the CRNA. Discussed and agreed on the Anesthesia Plan with the CRNA..      NPO Status:  No vitals data found for the desired time range.

## 2025-07-01 NOTE — H&P
History and Physical -  [facility]  Mattie Varela 80 y.o. female MRN: [id number]                  HPI: Mattie Varela is a 80 y.o. year old female who presents for EGD for abnormal CT of the stomach.      REVIEW OF SYSTEMS: Per the HPI, and otherwise unremarkable.    Historical Information   Past Medical History[1]  Past Surgical History[2]  Social History   Social History     Substance and Sexual Activity   Alcohol Use Not Currently    Comment: Denied history of alcohol use     Social History     Substance and Sexual Activity   Drug Use No    Comment: Denied history of drug use     Tobacco Use History[3]  Family History[4]    Meds/Allergies     Not in a hospital admission.    Allergies[5]    Objective     Blood pressure 134/60, pulse 95, temperature 98.2 °F (36.8 °C), temperature source Temporal, resp. rate 15, SpO2 94%, not currently breastfeeding.      PHYSICAL EXAM    Gen: NAD  CV: RRR  CHEST: Clear  ABD: soft, NT/ND  EXT: no edema      ASSESSMENT/PLAN:  Mattie Varela is a 80 y.o. year old female who presents for EGD for abnormal CT of the stomach. The patient is stable and optimized for the procedure, we reviewed risk and benefits. Risk include but not limited to infection, bleeding, perforation and missing a lesion.               [1]   Past Medical History:  Diagnosis Date    Acid reflux     Acute kidney injury (HCC) 06/18/2022    Anemia     hx of iron-deficient    Anxiety     Arthritis     Asthma     last needed inhaler last year 2020    Basal cell carcinoma     upper lip    Chronic narcotic dependence (HCC)     Chronic pain     Colitis 11/19/2024    Colon polyp     Cystocele     Diverticulosis     Dizziness     at times    Dysfunctional uterine bleeding     last assessed - 07May2014    Dysphagia     Fibromyalgia     Gastric ulcer     Gastroparesis     History of colonic polyps     last assessed - 09Feb2016    History of gastroesophageal reflux (GERD)     Hypercholesterolemia      Hyperlipidemia     Hypertension     Hyponatremia 01/13/2024    IBS (irritable bowel syndrome)     Pneumobilia 06/18/2022    Post laminectomy syndrome     S/P insertion of spinal cord stimulator 07/18/2018    s/p [employer] loop recorder 3/2/2024 03/02/2024    Seasonal allergies     Shortness of breath     exertional    Spinal stenosis     Status post lumbar spinal fusion 03/16/2018    Stroke (HCC)     pt states slight stroke March 2022   [2]   Past Surgical History:  Procedure Laterality Date    APPENDECTOMY      BACK SURGERY      BREAST CYST EXCISION Left     CARDIAC CATHETERIZATION  11/14/2023    Procedure: Cardiac catheterization;  Surgeon: Randolph Holt MD;  [facility];  Service: Cardiology    CARDIAC CATHETERIZATION N/A 11/14/2023    Procedure: Cardiac Coronary Angiogram;  Surgeon: Randolph Holt MD;  [facility];  Service: Cardiology    CARDIAC ELECTROPHYSIOLOGY PROCEDURE N/A 03/02/2024    Procedure: Cardiac loop recorder implant;  Surgeon: Edu Fontaine MD;  [facility];  Service: Cardiology    CHOLECYSTECTOMY      COLONOSCOPY      ESOPHAGOGASTRODUODENOSCOPY N/A 09/28/2016    Procedure: ESOPHAGOGASTRODUODENOSCOPY (EGD);  Surgeon: Mylene Moeller MD;  [facility]: [facility];  Service:     HERNIA REPAIR      HYSTERECTOMY      TTAH-BSO age 28    LAMINECTOMY      LUMBAR LAMINECTOMY      OOPHORECTOMY      age 28    MT ARTHRODESIS POSTERIOR/PSTLAT TQ 1NTRSPC THORACIC N/A 06/04/2018    Procedure: Reopening of lumbar incision for T12-L5 posterior instrumented fixation and fusion and T12-L4 posterior decompression;  Surgeon: Wood Rogel MD;  Location: [facility];  Service: Neurosurgery    MT COLONOSCOPY FLX DX W/COLLJ SPEC WHEN PFRMD N/A 03/02/2016    Procedure: EGD AND COLONOSCOPY;  Surgeon: Mylene Moeller MD;  [facility]: [facility];  Service: Gastroenterology    MT DILATION ESOPH UNGUIDED SOUND/BOUGIE 1/MULT PASS N/A 09/28/2016    Procedure: DILATATION  ESOPHAGEAL;  Surgeon: Mylene Moeller MD;  [facility];  Service: Gastroenterology    NV ESOPHAGOGASTRODUODENOSCOPY TRANSORAL DIAGNOSTIC N/A 2016    Procedure: ESOPHAGOGASTRODUODENOSCOPY (EGD);  Surgeon: Mylene Moeller MD;  [facility];  Service: Gastroenterology    NV INSJ/RPLCMT SPINAL NPG/RCVR POCKET CRTJ&CONNJ Left 2018    Procedure: removal of left buttock implantable pulse generator and placement of new  implantable pulse generator;  Surgeon: Wood Rogel MD;  Location: [facility];  Service: Neurosurgery   [3]   Social History  Tobacco Use   Smoking Status Former    Current packs/day: 0.00    Types: Cigarettes    Quit date: 1970    Years since quittin.5   Smokeless Tobacco Never   Tobacco Comments    Denied history of current ever day smoker, Former smoker and Never smoker all documented in [employer]   [4]   Family History  Problem Relation Name Age of Onset    Lung cancer Mother  46    Pulmonary embolism Father      No Known Problems Sister Kathrine     No Known Problems Daughter rohit     No Known Problems Daughter hayley     Stroke Maternal Grandmother      Heart attack Maternal Grandfather      No Known Problems Paternal Grandmother      No Known Problems Paternal Grandfather      No Known Problems Maternal Aunt      Diabetes Family          Diabetes mellitus    Hypertension Family      Stroke Family          Stroke complications   [5]   Allergies  Allergen Reactions    Penicillins Anaphylaxis, Hives and Other (See Comments)     Other reaction(s): Unknown Reaction    Sulfa Antibiotics Anaphylaxis and Other (See Comments)     Other reaction(s): Unknown Reaction    Aspartame - Food Allergy Other (See Comments) and Hypertension     Slurred speech, weakness, stroke sx    Iodinated Contrast Media Hives     Pt has taken prep prior for contrast and has not had any break through reaction    Keflex [Cephalexin] Hives

## 2025-07-01 NOTE — ED PROVIDER NOTES
Time reflects when diagnosis was documented in both MDM as applicable and the Disposition within this note       Time User Action Codes Description Comment    6/30/2025 11:59 PM Emerson Kennedy Add [T83.9XXA] Problem with Bateman catheter, initial encounter (HCC)           ED Disposition       ED Disposition   Discharge    Condition   Stable    Date/Time   Mon Jun 30, 2025 11:59 PM    Comment   Mattie Sureshcoopergisell discharge to home/self care.                   Assessment & Plan       Medical Decision Making  80-year-old female presents due to concerns of problem with her Bateman catheter.  On exam she is in NAD.  RN reportedly emptied the Bateman catheter bag shortly prior to exam, with disposal of about 800 cc clear yellow urine.  Although it appears to be draining well patient does feel like it is out of place and she has been experiencing leakage around the catheter insertion.  Therefore it was replaced to ensure no problems with the catheter or catheter balloon.  After replacing she states that she feels better and the abnormal sensation she was feeling is gone.  Will discharge back to sending facility.             Medications - No data to display    ED Risk Strat Scores                    No data recorded        SBIRT 22yo+      Flowsheet Row Most Recent Value   Initial Alcohol Screen: US AUDIT-C     1. How often do you have a drink containing alcohol? 0 Filed at: 06/30/2025 2242   2. How many drinks containing alcohol do you have on a typical day you are drinking?  0 Filed at: 06/30/2025 2242   3b. FEMALE Any Age, or MALE 65+: How often do you have 4 or more drinks on one occassion? 0 Filed at: 06/30/2025 2242   Audit-C Score 0 Filed at: 06/30/2025 2242   DUNCAN: How many times in the past year have you...    Used an illegal drug or used a prescription medication for non-medical reasons? Never Filed at: 06/30/2025 2242                            History of Present Illness       Chief Complaint   Patient presents with     "Urinary Catheter Problem     Patient coming from [facility] reporting she felt like she had to urinate and like the sensation was getting stronger. Reports urine on her pad. Bateman cath in place. Reports \"it feels like it's out of place.\" Denies pain.        Past Medical History[1]   Past Surgical History[2]   Family History[3]   Social History[4]   E-Cigarette/Vaping    E-Cigarette Use Never User       E-Cigarette/Vaping Substances    Nicotine No     THC No     CBD No     Flavoring No     Other No     Unknown No       I have reviewed and agree with the history as documented.     80-year-old female presents for evaluation of her chronic Bateman catheter.  States that it felt like her Bateman catheter was becoming displaced.  Has been experiencing occasional leakage around her urethra.  She also felt like she was having a strong urge to urinate.  No other symptoms or concerns.  No fevers, chills, nausea, vomiting, hematuria, diarrhea, abdominal pain.        Review of Systems   Constitutional:  Negative for chills and fever.   HENT:  Negative for ear pain and sore throat.    Eyes:  Negative for pain and visual disturbance.   Respiratory:  Negative for cough and shortness of breath.    Cardiovascular:  Negative for chest pain and palpitations.   Gastrointestinal:  Negative for abdominal pain and vomiting.   Genitourinary:  Negative for dysuria and hematuria.        Bateman catheter feels out of place. Leakage of urine. Increased sensation to urinate.   Musculoskeletal:  Negative for arthralgias and back pain.   Skin:  Negative for color change and rash.   Neurological:  Negative for seizures and syncope.   All other systems reviewed and are negative.          Objective       ED Triage Vitals [06/30/25 2237]   Temperature Pulse Blood Pressure Respirations SpO2 Patient Position - Orthostatic VS   98.6 °F (37 °C) 85 160/69 18 97 % Lying      Temp Source Heart Rate Source BP Location FiO2 (%) Pain Score    Oral Monitor Right " arm -- No Pain      Vitals      Date and Time Temp Pulse SpO2 Resp BP Pain Score FACES Pain Rating User   07/01/25 0057 -- 89 97 % 16 149/69 -- -- VF   06/30/25 2237 98.6 °F (37 °C) 85 97 % 18 160/69 No Pain -- VF            Physical Exam  Vitals and nursing note reviewed.   Constitutional:       General: She is not in acute distress.     Appearance: She is well-developed.   HENT:      Head: Normocephalic and atraumatic.     Eyes:      Conjunctiva/sclera: Conjunctivae normal.       Cardiovascular:      Rate and Rhythm: Normal rate and regular rhythm.      Heart sounds: No murmur heard.  Pulmonary:      Effort: Pulmonary effort is normal. No respiratory distress.      Breath sounds: Normal breath sounds.   Abdominal:      Palpations: Abdomen is soft.      Tenderness: There is no abdominal tenderness.     Musculoskeletal:         General: No swelling.      Cervical back: Neck supple.     Skin:     General: Skin is warm and dry.      Capillary Refill: Capillary refill takes less than 2 seconds.     Neurological:      Mental Status: She is alert.     Psychiatric:         Mood and Affect: Mood normal.         Results Reviewed       None            No orders to display       Procedures    ED Medication and Procedure Management   Prior to Admission Medications   Prescriptions Last Dose Informant Patient Reported? Taking?   Blood Glucose Monitoring Suppl (OneTouch Verio Reflect) w/Device KIT  Self No No   Sig: Check blood sugars twice daily. Please substitute with appropriate alternative as covered by patient's insurance. Dx: E11.65   Cholecalciferol (True Vitamin D3) 1.25 MG (11621 UT) capsule   Yes No   Sig: Take 50,000 Units by mouth in the morning.   Lidocaine 4 % PTCH  Self Yes No   Sig: Apply 4 % topically Apply 1 every morning to back and remove at bedtime   MAGNESIUM OXIDE 400 PO  Self Yes No   Sig: Take 400 mg by mouth in the morning and 400 mg before bedtime.   Milnacipran HCl (Savella) 100 MG TABS  Self No No    Sig: Take 1 tablet (100 mg total) by mouth daily   OneTouch Delica Lancets 33G MISC  Self No No   Sig: Check blood sugars twice daily. Please substitute with appropriate alternative as covered by patient's insurance. Dx: E11.65   acetaminophen (TYLENOL) 325 mg tablet  Self No No   Sig: Take 3 tablets (975 mg total) by mouth every 8 (eight) hours   albuterol (PROVENTIL HFA,VENTOLIN HFA) 90 mcg/act inhaler  Self No No   Sig: Inhale 2 puffs every 6 (six) hours as needed for wheezing or shortness of breath   aspirin 81 mg chewable tablet  Self Yes No   Sig: Chew 81 mg in the morning.   atorvastatin (LIPITOR) 40 mg tablet  Self No No   Sig: TAKE ONE TABLET BY MOUTH ONCE DAILY   docusate sodium (COLACE) 100 mg capsule  Self No No   Sig: Take 1 capsule (100 mg total) by mouth 2 (two) times a day   ergocalciferol (VITAMIN D2) 50,000 units  Self Yes No   Sig: Take 50,000 Units by mouth Take I cap orally once monthly   famotidine (PEPCID) 20 mg tablet   No No   Sig: Take 1 tablet (20 mg total) by mouth in the morning and 1 tablet (20 mg total) in the evening. Do all this for 5 days.   fentaNYL (DURAGESIC) 75 mcg/hr  Self Yes No   Sig: Place 1 patch on the skin every third day Apply 1 patch every 3 days   ferrous sulfate 325 (65 Fe) mg tablet  Self No No   Sig: Take 1 tablet (325 mg total) by mouth daily with breakfast   gabapentin (NEURONTIN) 300 mg capsule  Self No No   Sig: Take 1 capsule (300 mg total) by mouth daily at bedtime   glucose blood (OneTouch Verio) test strip  Self No No   Sig: Check blood sugars twice daily. Please substitute with appropriate alternative as covered by patient's insurance. Dx: E11.65   latanoprost (XALATAN) 0.005 % ophthalmic solution  Self Yes No   Sig: Administer 1 drop to both eyes daily at bedtime   levothyroxine 75 mcg tablet  Self No No   Sig: Take 0.5 tablets (37.5 mcg total) by mouth daily in the early morning   lidocaine (LIDODERM) 5 %  Self No No   Sig: Apply 1 patch topically over  12 hours daily Remove & Discard patch within 12 hours or as directed by MD   loperamide (IMODIUM A-D) 2 MG tablet  Self Yes No   Sig: Take 2 mg by mouth as needed in the morning and 2 mg as needed at noon and 2 mg as needed in the evening and 2 mg as needed before bedtime for diarrhea.   methocarbamol (ROBAXIN) 750 mg tablet  Self Yes No   Sig: Take 750 mg by mouth every 8 (eight) hours 1 tab orally every 8 hours as needed for muscle spasms   metoprolol succinate (TOPROL-XL) 25 mg 24 hr tablet  Self No No   Sig: Take 0.5 tablets (12.5 mg total) by mouth daily   morphine (MSIR) 15 mg tablet  Self No No   Sig: Take 1 tablet (15 mg total) by mouth every 6 (six) hours Max Daily Amount: 60 mg   nystatin (MYCOSTATIN) powder   No No   Sig: Apply topically 2 (two) times a day   ondansetron (ZOFRAN) 4 mg tablet  Self No No   Sig: Take 1 tablet (4 mg total) by mouth every 8 (eight) hours as needed for nausea or vomiting   polyethylene glycol (MIRALAX) 17 g packet  Self No No   Sig: Take 17 g by mouth daily   potassium chloride (Klor-Con M20) 20 mEq tablet   No No   Sig: Take 1 tablet (20 mEq total) by mouth daily   senna (SENOKOT) 8.6 mg  Self No No   Sig: Take 2 tablets (17.2 mg total) by mouth every morning   simethicone (MYLICON) 80 mg chewable tablet  Self Yes No   Sig: Chew 80 mg every 6 (six) hours as needed for flatulence Chew and swallow 1 tab orally 3 times daily as needed for gas   torsemide (DEMADEX) 20 mg tablet   No No   Sig: Take 2 tablets (40 mg total) by mouth 2 (two) times a day   trospium chloride (SANCTURA) 20 mg tablet  Self No No   Sig: Take 1 tablet (20 mg total) by mouth 2 (two) times a day   zolpidem (AMBIEN CR) 6.25 MG CR tablet  Self Yes No   Sig: Take 6.25 mg by mouth daily at bedtime as needed for sleep      Facility-Administered Medications: None     Discharge Medication List as of 6/30/2025 11:59 PM        CONTINUE these medications which have NOT CHANGED    Details   acetaminophen (TYLENOL) 325  mg tablet Take 3 tablets (975 mg total) by mouth every 8 (eight) hours, Starting Sat 10/19/2024, No Print      albuterol (PROVENTIL HFA,VENTOLIN HFA) 90 mcg/act inhaler Inhale 2 puffs every 6 (six) hours as needed for wheezing or shortness of breath, Starting Wed 6/29/2022, Normal      aspirin 81 mg chewable tablet Chew 81 mg in the morning., Historical Med      atorvastatin (LIPITOR) 40 mg tablet TAKE ONE TABLET BY MOUTH ONCE DAILY, Normal      Blood Glucose Monitoring Suppl (OneTouch Verio Reflect) w/Device KIT Check blood sugars twice daily. Please substitute with appropriate alternative as covered by patient's insurance. Dx: E11.65, Normal      Cholecalciferol (True Vitamin D3) 1.25 MG (66846 UT) capsule Take 50,000 Units by mouth in the morning., Historical Med      docusate sodium (COLACE) 100 mg capsule Take 1 capsule (100 mg total) by mouth 2 (two) times a day, Starting Wed 6/5/2024, No Print      ergocalciferol (VITAMIN D2) 50,000 units Take 50,000 Units by mouth Take I cap orally once monthly, Historical Med      famotidine (PEPCID) 20 mg tablet Take 1 tablet (20 mg total) by mouth in the morning and 1 tablet (20 mg total) in the evening. Do all this for 5 days., Starting Tue 6/17/2025, Until Sun 6/22/2025, Normal      fentaNYL (DURAGESIC) 75 mcg/hr Place 1 patch on the skin every third day Apply 1 patch every 3 days, Historical Med      ferrous sulfate 325 (65 Fe) mg tablet Take 1 tablet (325 mg total) by mouth daily with breakfast, Starting Wed 1/10/2024, Print      gabapentin (NEURONTIN) 300 mg capsule Take 1 capsule (300 mg total) by mouth daily at bedtime, Starting Thu 2/13/2025, No Print      glucose blood (OneTouch Verio) test strip Check blood sugars twice daily. Please substitute with appropriate alternative as covered by patient's insurance. Dx: E11.65, Normal      latanoprost (XALATAN) 0.005 % ophthalmic solution Administer 1 drop to both eyes daily at bedtime, Historical Med      levothyroxine  75 mcg tablet Take 0.5 tablets (37.5 mcg total) by mouth daily in the early morning, Starting Wed 1/17/2024, Until Fri 6/20/2025, No Print      lidocaine (LIDODERM) 5 % Apply 1 patch topically over 12 hours daily Remove & Discard patch within 12 hours or as directed by MD, Starting Sun 10/20/2024, No Print      Lidocaine 4 % PTCH Apply 4 % topically Apply 1 every morning to back and remove at bedtime, Historical Med      loperamide (IMODIUM A-D) 2 MG tablet Take 2 mg by mouth as needed in the morning and 2 mg as needed at noon and 2 mg as needed in the evening and 2 mg as needed before bedtime for diarrhea., Historical Med      MAGNESIUM OXIDE 400 PO Take 400 mg by mouth in the morning and 400 mg before bedtime., Historical Med      methocarbamol (ROBAXIN) 750 mg tablet Take 750 mg by mouth every 8 (eight) hours 1 tab orally every 8 hours as needed for muscle spasms, Historical Med      metoprolol succinate (TOPROL-XL) 25 mg 24 hr tablet Take 0.5 tablets (12.5 mg total) by mouth daily, Starting Tue 12/3/2024, No Print      Milnacipran HCl (Savella) 100 MG TABS Take 1 tablet (100 mg total) by mouth daily, Starting Sun 3/5/2023, Normal      morphine (MSIR) 15 mg tablet Take 1 tablet (15 mg total) by mouth every 6 (six) hours Max Daily Amount: 60 mg, Starting Thu 2/13/2025, Fill Later      nystatin (MYCOSTATIN) powder Apply topically 2 (two) times a day, Starting Fri 4/25/2025, Normal      ondansetron (ZOFRAN) 4 mg tablet Take 1 tablet (4 mg total) by mouth every 8 (eight) hours as needed for nausea or vomiting, Starting Thu 1/23/2025, No Print      OneTouch Delica Lancets 33G MISC Check blood sugars twice daily. Please substitute with appropriate alternative as covered by patient's insurance. Dx: E11.65, Normal      polyethylene glycol (MIRALAX) 17 g packet Take 17 g by mouth daily, Starting Wed 6/5/2024, No Print      potassium chloride (Klor-Con M20) 20 mEq tablet Take 1 tablet (20 mEq total) by mouth daily,  Starting Fri 4/11/2025, Normal      senna (SENOKOT) 8.6 mg Take 2 tablets (17.2 mg total) by mouth every morning, Starting Thu 2/13/2025, No Print      simethicone (MYLICON) 80 mg chewable tablet Chew 80 mg every 6 (six) hours as needed for flatulence Chew and swallow 1 tab orally 3 times daily as needed for gas, Historical Med      torsemide (DEMADEX) 20 mg tablet Take 2 tablets (40 mg total) by mouth 2 (two) times a day, Starting Mon 5/19/2025, Normal      trospium chloride (SANCTURA) 20 mg tablet Take 1 tablet (20 mg total) by mouth 2 (two) times a day, Starting Wed 2/12/2025, Normal      zolpidem (AMBIEN CR) 6.25 MG CR tablet Take 6.25 mg by mouth daily at bedtime as needed for sleep, Historical Med           No discharge procedures on file.  ED SEPSIS DOCUMENTATION   Time reflects when diagnosis was documented in both MDM as applicable and the Disposition within this note       Time User Action Codes Description Comment    6/30/2025 11:59 PM Emerson Kennedy Add [T83.9XXA] Problem with Bateman catheter, initial encounter (HCC)                      [1]   Past Medical History:  Diagnosis Date    Acid reflux     Acute kidney injury (HCC) 06/18/2022    Anemia     hx of iron-deficient    Anxiety     Arthritis     Asthma     last needed inhaler last year 2020    Basal cell carcinoma     upper lip    Chronic narcotic dependence (HCC)     Chronic pain     Colitis 11/19/2024    Colon polyp     Cystocele     Diverticulosis     Dizziness     at times    Dysfunctional uterine bleeding     last assessed - [id number]    Dysphagia     Fibromyalgia     Gastric ulcer     Gastroparesis     History of colonic polyps     last assessed - [id number]    History of gastroesophageal reflux (GERD)     Hypercholesterolemia     Hyperlipidemia     Hypertension     Hyponatremia 01/13/2024    IBS (irritable bowel syndrome)     Pneumobilia 06/18/2022    Post laminectomy syndrome     S/P insertion of spinal cord stimulator 07/18/2018    s/p Medtronic  loop recorder 3/2/2024 03/02/2024    Seasonal allergies     Shortness of breath     exertional    Spinal stenosis     Status post lumbar spinal fusion 03/16/2018    Stroke (HCC)     pt states slight stroke March 2022   [2]   Past Surgical History:  Procedure Laterality Date    APPENDECTOMY      BACK SURGERY      BREAST CYST EXCISION Left     CARDIAC CATHETERIZATION  11/14/2023    Procedure: Cardiac catheterization;  Surgeon: Randolph Holt MD;  [facility];  Service: Cardiology    CARDIAC CATHETERIZATION N/A 11/14/2023    Procedure: Cardiac Coronary Angiogram;  Surgeon: Randolph Holt MD;  [facility];  Service: Cardiology    CARDIAC ELECTROPHYSIOLOGY PROCEDURE N/A 03/02/2024    Procedure: Cardiac loop recorder implant;  Surgeon: Edu Fontaine MD;  [facility];  Service: Cardiology    CHOLECYSTECTOMY      COLONOSCOPY      ESOPHAGOGASTRODUODENOSCOPY N/A 09/28/2016    Procedure: ESOPHAGOGASTRODUODENOSCOPY (EGD);  Surgeon: Mylene Moeller MD;  [facility]: [facility];  Service:     HERNIA REPAIR      HYSTERECTOMY      TTAH-BSO age 28    LAMINECTOMY      LUMBAR LAMINECTOMY      OOPHORECTOMY      age 28    [place] [facility]A 06/04/2018    Procedure: Reopening of lumbar incision for T12-L5 posterior instrumented fixation and fusion and T12-L4 posterior decompression;  Surgeon: Wood Rogel MD;  [facility];  Service: Neurosurgery    IL COLONOSCOPY FLX DX W/COLLJ SPEC WHEN PFRMD N/A 03/02/2016    Procedure: EGD AND COLONOSCOPY;  Surgeon: Mylene Moeller MD;  [facility];  Service: Gastroenterology    IL DILATION ESOPH UNGUIDED SOUND/BOUGIE 1/MULT PASS N/A 09/28/2016    Procedure: DILATATION ESOPHAGEAL;  Surgeon: Mylene Moeller MD;  [facility];  Service: Gastroenterology    IL ESOPHAGOGASTRODUODENOSCOPY TRANSORAL DIAGNOSTIC N/A 07/18/2016    Procedure: ESOPHAGOGASTRODUODENOSCOPY (EGD);  Surgeon: Mylene  MD Sunni;  [facility];  Service: Gastroenterology    SD INSJ/RPLCMT SPINAL NPG/RCVR POCKET CRTJ&CONNJ Left 2018    Procedure: removal of left buttock implantable pulse generator and placement of new  implantable pulse generator;  Surgeon: Wood Rogel MD;  [facility];  Service: Neurosurgery   [3]   Family History  Problem Relation Name Age of Onset    Lung cancer Mother  46    Pulmonary embolism Father      No Known Problems Sister Kathrine     No Known Problems Daughter rohit     No Known Problems Daughter hayley     Stroke Maternal Grandmother      Heart attack Maternal Grandfather      No Known Problems Paternal Grandmother      No Known Problems Paternal Grandfather      No Known Problems Maternal Aunt      Diabetes Family          Diabetes mellitus    Hypertension Family      Stroke Family          Stroke complications   [4]   Social History  Tobacco Use    Smoking status: Former     Current packs/day: 0.00     Types: Cigarettes     Quit date: 1970     Years since quittin.5    Smokeless tobacco: Never    Tobacco comments:     Denied history of current ever day smoker, Former smoker and Never smoker all documented in [employer]   Vaping Use    Vaping status: Never Used   Substance Use Topics    Alcohol use: Not Currently     Comment: Denied history of alcohol use    Drug use: No     Comment: Denied history of drug use        Emerson Kennedy PA-C  25 [phone]

## 2025-07-01 NOTE — TELEPHONE ENCOUNTER
----- Message from Zaheer YO sent at 7/1/2025 10:14 AM EDT -----    ----- Message -----  From: Garth Arredondo MD  Sent: 7/1/2025  10:05 AM EDT  To: [facility]    Please schedule EGD on July 28th at [facility] with me.  Patient will need to be on 1 day of clear liquid diet prior to the date of the procedure.  Procedure ordered.

## 2025-07-21 ENCOUNTER — TELEPHONE (OUTPATIENT)
Age: 80
End: 2025-07-21

## 2025-07-21 DIAGNOSIS — N32.81 DETRUSOR INSTABILITY: ICD-10-CM

## 2025-07-21 DIAGNOSIS — R39.9 UTI SYMPTOMS: Primary | ICD-10-CM

## 2025-07-21 RX ORDER — TROSPIUM CHLORIDE 20 MG/1
20 TABLET, FILM COATED ORAL 2 TIMES DAILY
Qty: 180 TABLET | Refills: 3 | Status: SHIPPED | OUTPATIENT
Start: 2025-07-21

## 2025-07-21 NOTE — TELEPHONE ENCOUNTER
Called and spoke with patient. Advised a new RX was sent to pharmacy for the Trospium. She is unsure if she is taking as she lives in a facility and only takes what she is given. She took down the name of medication to make sure she is given this. Advised once she goes for urine testing, we will call with results and abx if needed. She verbalized understanding.     Clinical to monitor for urine testing.

## 2025-07-21 NOTE — TELEPHONE ENCOUNTER
Patient calling in stating we need to reach out to [facility] in regards to getting urine testing done for patient. They will need confirmation from our office that this is needed.       CB: [phone]

## 2025-07-21 NOTE — TELEPHONE ENCOUNTER
Patient complains that she feels like her ahn is moving inside while she is walking and having pressure and constant urge to urinate. This started about 3 days go. Urine is cloudy    Denies fever, chills, urine is draining well into bag    Encouraged to increase water to 64 ounces daily, decrease bladder irritants, call back if new or worsening symptoms, UA and UCX orders placed, reviewed proper specimen collection    Patient is going to make some calls to get a ride to go to the Lab

## 2025-07-21 NOTE — TELEPHONE ENCOUNTER
please make sure she is getting her trospium for bladder spasms... it looks close to (d). i am refilling it to make sure. will f/u ucx

## 2025-07-22 NOTE — TELEPHONE ENCOUNTER
Orders for urine testing faxed to [facility] in order for patient to get testing done. On fax cover sheet a request was made for results to be faxed to office at [phone]. Office will monitor for results.

## 2025-07-22 NOTE — TELEPHONE ENCOUNTER
Patient called back and relayed that [facility] was faxed the orders for urine testing. Patient verbalized understanding and will contact them to get the testing completed.

## 2025-07-24 ENCOUNTER — HOSPITAL ENCOUNTER (EMERGENCY)
Facility: HOSPITAL | Age: 80
Discharge: HOME | End: 2025-07-25
Attending: EMERGENCY MEDICINE
Payer: MEDICARE

## 2025-07-24 ENCOUNTER — APPOINTMENT (OUTPATIENT)
Dept: LAB | Facility: HOSPITAL | Age: 80
End: 2025-07-24
Payer: MEDICARE

## 2025-07-24 VITALS
RESPIRATION RATE: 16 BRPM | WEIGHT: 176.37 LBS | TEMPERATURE: 97.9 F | SYSTOLIC BLOOD PRESSURE: 140 MMHG | OXYGEN SATURATION: 99 % | BODY MASS INDEX: 34.44 KG/M2 | HEART RATE: 85 BPM | DIASTOLIC BLOOD PRESSURE: 61 MMHG

## 2025-07-24 DIAGNOSIS — R39.9 UTI SYMPTOMS: ICD-10-CM

## 2025-07-24 DIAGNOSIS — Z46.6 ENCOUNTER FOR FOLEY CATHETER REPLACEMENT: Primary | ICD-10-CM

## 2025-07-24 LAB
BACTERIA UR QL AUTO: ABNORMAL /HPF
BILIRUB UR QL STRIP: NEGATIVE
CLARITY UR: ABNORMAL
COLOR UR: ABNORMAL
GLUCOSE UR STRIP-MCNC: NEGATIVE MG/DL
HGB UR QL STRIP.AUTO: NEGATIVE
KETONES UR STRIP-MCNC: NEGATIVE MG/DL
LEUKOCYTE ESTERASE UR QL STRIP: ABNORMAL
NITRITE UR QL STRIP: POSITIVE
NON-SQ EPI CELLS URNS QL MICRO: ABNORMAL /HPF
PH UR STRIP.AUTO: 7 [PH]
PROT UR STRIP-MCNC: NEGATIVE MG/DL
RBC #/AREA URNS AUTO: ABNORMAL /HPF
SP GR UR STRIP.AUTO: 1.01 (ref 1–1.03)
UROBILINOGEN UR STRIP-ACNC: <2 MG/DL
WBC #/AREA URNS AUTO: ABNORMAL /HPF

## 2025-07-24 PROCEDURE — 81001 URINALYSIS AUTO W/SCOPE: CPT

## 2025-07-24 PROCEDURE — 99283 EMERGENCY DEPT VISIT LOW MDM: CPT

## 2025-07-24 PROCEDURE — 87086 URINE CULTURE/COLONY COUNT: CPT

## 2025-07-24 PROCEDURE — 99284 EMERGENCY DEPT VISIT MOD MDM: CPT | Performed by: EMERGENCY MEDICINE

## 2025-07-25 LAB — BACTERIA UR CULT: NORMAL

## 2025-07-25 RX ORDER — MORPHINE SULFATE 15 MG/1
15 TABLET ORAL ONCE
Refills: 0 | Status: COMPLETED | OUTPATIENT
Start: 2025-07-25 | End: 2025-07-25

## 2025-07-25 RX ORDER — METHOCARBAMOL 750 MG/1
750 TABLET, FILM COATED ORAL ONCE
Status: COMPLETED | OUTPATIENT
Start: 2025-07-25 | End: 2025-07-25

## 2025-07-25 RX ADMIN — METHOCARBAMOL 750 MG: 750 TABLET ORAL at 01:31

## 2025-07-25 RX ADMIN — MORPHINE SULFATE 15 MG: 15 TABLET ORAL at 01:31

## 2025-07-25 NOTE — ED PROVIDER NOTES
Time reflects when diagnosis was documented in both MDM as applicable and the Disposition within this note       Time User Action Codes Description Comment    7/24/2025 11:25 PM Marly Cleary Add [Z46.6] Encounter for Ahn catheter replacement           ED Disposition       ED Disposition   Discharge    Condition   Stable    Date/Time   Thu Jul 24, 2025 11:25 PM    Comment   Mattie Varela discharge to home/self care.                   Assessment & Plan       Medical Decision Making  Ahn accidentally pulled out- will replace and discharge back to facility    Problems Addressed:  Encounter for Ahn catheter replacement: acute illness or injury    Risk  Prescription drug management.             Medications   morphine (MSIR) IR tablet 15 mg (15 mg Oral Given 7/25/25 0131)   methocarbamol (ROBAXIN) tablet 750 mg (750 mg Oral Given 7/25/25 0131)       ED Risk Strat Scores                    No data recorded                            History of Present Illness       Chief Complaint   Patient presents with    Urinary Catheter Insertion or Check     Per EMS patient catheter not draining. Upon further inspection, catheter has been removed. Patient complaining of pain to area.       Past Medical History[1]   Past Surgical History[2]   Family History[3]   Social History[4]   E-Cigarette/Vaping    E-Cigarette Use Never User       E-Cigarette/Vaping Substances    Nicotine No     THC No     CBD No     Flavoring No     Other No     Unknown No       I have reviewed and agree with the history as documented.     79 yo F presenting from NH with concern for Ahn issue.  EMS report that NH staff thought ahn was obstructed because it was not draining.  On arrival, ahn was noted to be completely out of pt with balloon still intact.  She c/o some discomfort at urethra, otherwise no complaints/concerns          Review of Systems        Objective       ED Triage Vitals   Temperature Pulse Blood Pressure Respirations SpO2 Patient  Position - Orthostatic VS   07/24/25 2306 07/24/25 2306 07/24/25 2306 07/24/25 2306 07/24/25 2306 07/24/25 2306   97.9 °F (36.6 °C) 85 140/61 16 99 % Sitting      Temp Source Heart Rate Source BP Location FiO2 (%) Pain Score    07/24/25 2306 07/24/25 2306 07/24/25 2306 -- 07/25/25 0131    Oral Monitor Left arm  8      Vitals      Date and Time Temp Pulse SpO2 Resp BP Pain Score FACES Pain Rating User   07/25/25 0131 -- -- -- -- -- 8 -- RC   07/24/25 2306 97.9 °F (36.6 °C) 85 99 % 16 140/61 -- --             Physical Exam  Vitals and nursing note reviewed.   Constitutional:       Appearance: Normal appearance.   HENT:      Head: Normocephalic and atraumatic.     Cardiovascular:      Rate and Rhythm: Normal rate.   Pulmonary:      Effort: Pulmonary effort is normal. No respiratory distress.   Abdominal:      Palpations: Abdomen is soft. There is no mass.      Tenderness: There is no abdominal tenderness.      Hernia: No hernia is present.     Musculoskeletal:         General: Normal range of motion.     Skin:     General: Skin is warm and dry.     Neurological:      Mental Status: She is alert.         Results Reviewed       None            No orders to display       Procedures    ED Medication and Procedure Management   Prior to Admission Medications   Prescriptions Last Dose Informant Patient Reported? Taking?   Blood Glucose Monitoring Suppl (OneTouch Verio Reflect) w/Device KIT  Self No No   Sig: Check blood sugars twice daily. Please substitute with appropriate alternative as covered by patient's insurance. Dx: E11.65   Cholecalciferol (True Vitamin D3) 1.25 MG (29054 UT) capsule   Yes No   Sig: Take 50,000 Units by mouth in the morning.   Lidocaine 4 % PTCH  Self Yes No   Sig: Apply 4 % topically Apply 1 every morning to back and remove at bedtime   MAGNESIUM OXIDE 400 PO  Self Yes No   Sig: Take 400 mg by mouth in the morning and 400 mg before bedtime.   Milnacipran HCl (Savella) 100 MG TABS  Self No No    Sig: Take 1 tablet (100 mg total) by mouth daily   OneTouch Delica Lancets 33G MISC  Self No No   Sig: Check blood sugars twice daily. Please substitute with appropriate alternative as covered by patient's insurance. Dx: E11.65   acetaminophen (TYLENOL) 325 mg tablet  Self No No   Sig: Take 3 tablets (975 mg total) by mouth every 8 (eight) hours   albuterol (PROVENTIL HFA,VENTOLIN HFA) 90 mcg/act inhaler  Self No No   Sig: Inhale 2 puffs every 6 (six) hours as needed for wheezing or shortness of breath   aspirin 81 mg chewable tablet  Self Yes No   Sig: Chew 81 mg in the morning.   atorvastatin (LIPITOR) 40 mg tablet  Self No No   Sig: TAKE ONE TABLET BY MOUTH ONCE DAILY   docusate sodium (COLACE) 100 mg capsule  Self No No   Sig: Take 1 capsule (100 mg total) by mouth 2 (two) times a day   ergocalciferol (VITAMIN D2) 50,000 units  Self Yes No   Sig: Take 50,000 Units by mouth Take I cap orally once monthly   famotidine (PEPCID) 20 mg tablet   No No   Sig: Take 1 tablet (20 mg total) by mouth in the morning and 1 tablet (20 mg total) in the evening. Do all this for 5 days.   fentaNYL (DURAGESIC) 75 mcg/hr  Self Yes No   Sig: Place 1 patch on the skin every third day Apply 1 patch every 3 days   ferrous sulfate 325 (65 Fe) mg tablet  Self No No   Sig: Take 1 tablet (325 mg total) by mouth daily with breakfast   gabapentin (NEURONTIN) 300 mg capsule  Self No No   Sig: Take 1 capsule (300 mg total) by mouth daily at bedtime   Patient taking differently: Take 300 mg by mouth daily at bedtime Pt not taking   glucose blood (OneTouch Verio) test strip  Self No No   Sig: Check blood sugars twice daily. Please substitute with appropriate alternative as covered by patient's insurance. Dx: E11.65   latanoprost (XALATAN) 0.005 % ophthalmic solution  Self Yes No   Sig: Administer 1 drop to both eyes daily at bedtime   levothyroxine 75 mcg tablet  Self No No   Sig: Take 0.5 tablets (37.5 mcg total) by mouth daily in the early  morning   lidocaine (LIDODERM) 5 %  Self No No   Sig: Apply 1 patch topically over 12 hours daily Remove & Discard patch within 12 hours or as directed by MD   loperamide (IMODIUM A-D) 2 MG tablet  Self Yes No   Sig: Take 2 mg by mouth as needed in the morning and 2 mg as needed at noon and 2 mg as needed in the evening and 2 mg as needed before bedtime for diarrhea.   methocarbamol (ROBAXIN) 750 mg tablet  Self Yes No   Sig: Take 750 mg by mouth every 8 (eight) hours 1 tab orally every 8 hours as needed for muscle spasms   metoprolol succinate (TOPROL-XL) 25 mg 24 hr tablet  Self No No   Sig: Take 0.5 tablets (12.5 mg total) by mouth daily   morphine (MSIR) 15 mg tablet  Self No No   Sig: Take 1 tablet (15 mg total) by mouth every 6 (six) hours Max Daily Amount: 60 mg   nystatin (MYCOSTATIN) powder   No No   Sig: Apply topically 2 (two) times a day   ondansetron (ZOFRAN) 4 mg tablet  Self No No   Sig: Take 1 tablet (4 mg total) by mouth every 8 (eight) hours as needed for nausea or vomiting   polyethylene glycol (MIRALAX) 17 g packet  Self No No   Sig: Take 17 g by mouth daily   potassium chloride (Klor-Con M20) 20 mEq tablet   No No   Sig: Take 1 tablet (20 mEq total) by mouth daily   Patient taking differently: Take 20 mEq by mouth in the morning. Pt not taking .   senna (SENOKOT) 8.6 mg  Self No No   Sig: Take 2 tablets (17.2 mg total) by mouth every morning   simethicone (MYLICON) 80 mg chewable tablet  Self Yes No   Sig: Chew 80 mg every 6 (six) hours as needed for flatulence Chew and swallow 1 tab orally 3 times daily as needed for gas   torsemide (DEMADEX) 20 mg tablet   No No   Sig: Take 2 tablets (40 mg total) by mouth 2 (two) times a day   trospium chloride (SANCTURA) 20 mg tablet   No No   Sig: Take 1 tablet (20 mg total) by mouth 2 (two) times a day   zolpidem (AMBIEN CR) 6.25 MG CR tablet  Self Yes No   Sig: Take 6.25 mg by mouth daily at bedtime as needed for sleep      Facility-Administered  Medications: None     Discharge Medication List as of 7/24/2025 11:25 PM        CONTINUE these medications which have NOT CHANGED    Details   acetaminophen (TYLENOL) 325 mg tablet Take 3 tablets (975 mg total) by mouth every 8 (eight) hours, Starting Sat 10/19/2024, No Print      albuterol (PROVENTIL HFA,VENTOLIN HFA) 90 mcg/act inhaler Inhale 2 puffs every 6 (six) hours as needed for wheezing or shortness of breath, Starting Wed 6/29/2022, Normal      aspirin 81 mg chewable tablet Chew 81 mg in the morning., Historical Med      atorvastatin (LIPITOR) 40 mg tablet TAKE ONE TABLET BY MOUTH ONCE DAILY, Normal      Blood Glucose Monitoring Suppl (OneTouch Verio Reflect) w/Device KIT Check blood sugars twice daily. Please substitute with appropriate alternative as covered by patient's insurance. Dx: E11.65, Normal      Cholecalciferol (True Vitamin D3) 1.25 MG (83347 UT) capsule Take 50,000 Units by mouth in the morning., Historical Med      docusate sodium (COLACE) 100 mg capsule Take 1 capsule (100 mg total) by mouth 2 (two) times a day, Starting Wed 6/5/2024, No Print      ergocalciferol (VITAMIN D2) 50,000 units Take 50,000 Units by mouth Take I cap orally once monthly, Historical Med      famotidine (PEPCID) 20 mg tablet Take 1 tablet (20 mg total) by mouth in the morning and 1 tablet (20 mg total) in the evening. Do all this for 5 days., Starting Tue 6/17/2025, Until Thu 7/24/2025, Normal      fentaNYL (DURAGESIC) 75 mcg/hr Place 1 patch on the skin every third day Apply 1 patch every 3 days, Historical Med      ferrous sulfate 325 (65 Fe) mg tablet Take 1 tablet (325 mg total) by mouth daily with breakfast, Starting Wed 1/10/2024, Print      gabapentin (NEURONTIN) 300 mg capsule Take 1 capsule (300 mg total) by mouth daily at bedtime, Starting Thu 2/13/2025, No Print      glucose blood (OneTouch Verio) test strip Check blood sugars twice daily. Please substitute with appropriate alternative as covered by  patient's insurance. Dx: E11.65, Normal      latanoprost (XALATAN) 0.005 % ophthalmic solution Administer 1 drop to both eyes daily at bedtime, Historical Med      levothyroxine 75 mcg tablet Take 0.5 tablets (37.5 mcg total) by mouth daily in the early morning, Starting Wed 1/17/2024, Until Thu 7/24/2025, No Print      lidocaine (LIDODERM) 5 % Apply 1 patch topically over 12 hours daily Remove & Discard patch within 12 hours or as directed by MD, Starting Sun 10/20/2024, No Print      Lidocaine 4 % PTCH Apply 4 % topically Apply 1 every morning to back and remove at bedtime, Historical Med      loperamide (IMODIUM A-D) 2 MG tablet Take 2 mg by mouth as needed in the morning and 2 mg as needed at noon and 2 mg as needed in the evening and 2 mg as needed before bedtime for diarrhea., Historical Med      MAGNESIUM OXIDE 400 PO Take 400 mg by mouth in the morning and 400 mg before bedtime., Historical Med      methocarbamol (ROBAXIN) 750 mg tablet Take 750 mg by mouth every 8 (eight) hours 1 tab orally every 8 hours as needed for muscle spasms, Historical Med      metoprolol succinate (TOPROL-XL) 25 mg 24 hr tablet Take 0.5 tablets (12.5 mg total) by mouth daily, Starting Tue 12/3/2024, No Print      Milnacipran HCl (Savella) 100 MG TABS Take 1 tablet (100 mg total) by mouth daily, Starting Sun 3/5/2023, Normal      morphine (MSIR) 15 mg tablet Take 1 tablet (15 mg total) by mouth every 6 (six) hours Max Daily Amount: 60 mg, Starting Thu 2/13/2025, Fill Later      nystatin (MYCOSTATIN) powder Apply topically 2 (two) times a day, Starting Fri 4/25/2025, Normal      ondansetron (ZOFRAN) 4 mg tablet Take 1 tablet (4 mg total) by mouth every 8 (eight) hours as needed for nausea or vomiting, Starting Thu 1/23/2025, No Print      OneTouch Delica Lancets 33G MISC Check blood sugars twice daily. Please substitute with appropriate alternative as covered by patient's insurance. Dx: E11.65, Normal      polyethylene glycol  (MIRALAX) 17 g packet Take 17 g by mouth daily, Starting Wed 6/5/2024, No Print      potassium chloride (Klor-Con M20) 20 mEq tablet Take 1 tablet (20 mEq total) by mouth daily, Starting Fri 4/11/2025, Normal      senna (SENOKOT) 8.6 mg Take 2 tablets (17.2 mg total) by mouth every morning, Starting Thu 2/13/2025, No Print      simethicone (MYLICON) 80 mg chewable tablet Chew 80 mg every 6 (six) hours as needed for flatulence Chew and swallow 1 tab orally 3 times daily as needed for gas, Historical Med      torsemide (DEMADEX) 20 mg tablet Take 2 tablets (40 mg total) by mouth 2 (two) times a day, Starting Mon 5/19/2025, Normal      trospium chloride (SANCTURA) 20 mg tablet Take 1 tablet (20 mg total) by mouth 2 (two) times a day, Starting Mon 7/21/2025, Normal      zolpidem (AMBIEN CR) 6.25 MG CR tablet Take 6.25 mg by mouth daily at bedtime as needed for sleep, Historical Med           No discharge procedures on file.  ED SEPSIS DOCUMENTATION   Time reflects when diagnosis was documented in both MDM as applicable and the Disposition within this note       Time User Action Codes Description Comment    7/24/2025 11:25 PM Marly Cleary Add [Z46.6] Encounter for Bateman catheter replacement                      [1]   Past Medical History:  Diagnosis Date    Acid reflux     Acute kidney injury (HCC) 06/18/2022    Anemia     hx of iron-deficient    Anxiety     Arthritis     Asthma     last needed inhaler last year 2020    Basal cell carcinoma     upper lip    Chronic narcotic dependence (HCC)     Chronic pain     Colitis 11/19/2024    Colon polyp     Cystocele     Diverticulosis     Dizziness     at times    Dysfunctional uterine bleeding     last assessed - [id number]    Dysphagia     Fibromyalgia     Gastric ulcer     Gastroparesis     History of colonic polyps     last assessed - [id number]    History of gastroesophageal reflux (GERD)     Hypercholesterolemia     Hyperlipidemia     Hypertension     Hyponatremia 01/13/2024     IBS (irritable bowel syndrome)     Pneumobilia 06/18/2022    Post laminectomy syndrome     S/P insertion of spinal cord stimulator 07/18/2018    s/p Medtronic loop recorder 3/2/2024 03/02/2024    Seasonal allergies     Shortness of breath     exertional    Spinal stenosis     Status post lumbar spinal fusion 03/16/2018    Stroke (HCC)     pt states slight stroke March 2022   [2]   Past Surgical History:  Procedure Laterality Date    APPENDECTOMY      BACK SURGERY      BREAST CYST EXCISION Left     CARDIAC CATHETERIZATION  11/14/2023    Procedure: Cardiac catheterization;  Surgeon: Randolph Holt MD;  [facility];  Service: Cardiology    CARDIAC CATHETERIZATION N/A 11/14/2023    Procedure: Cardiac Coronary Angiogram;  Surgeon: Randolph Holt MD;  [facility];  Service: Cardiology    CARDIAC ELECTROPHYSIOLOGY PROCEDURE N/A 03/02/2024    Procedure: Cardiac loop recorder implant;  Surgeon: Edu Fontaine MD;  [facility];  Service: Cardiology    CHOLECYSTECTOMY      COLONOSCOPY      ESOPHAGOGASTRODUODENOSCOPY N/A 09/28/2016    Procedure: ESOPHAGOGASTRODUODENOSCOPY (EGD);  Surgeon: Mylene Moeller MD;  [facility]: [facility];  Service:     HERNIA REPAIR      HYSTERECTOMY      TTAH-BSO age 28    LAMINECTOMY      LUMBAR LAMINECTOMY      OOPHORECTOMY      age 28    ME ARTHRODESIS POSTERIOR/PSTLAT TQ 1NTRSPC THORACIC N/A 06/04/2018    Procedure: Reopening of lumbar incision for T12-L5 posterior instrumented fixation and fusion and T12-L4 posterior decompression;  Surgeon: Wood Rogel MD;  [facility]: [facility];  Service: Neurosurgery    ME COLONOSCOPY FLX DX W/COLLJ SPEC WHEN PFRMD N/A 03/02/2016    Procedure: EGD AND COLONOSCOPY;  Surgeon: Mylene Moeller MD;  [facility]: [facility];  Service: Gastroenterology    ME DILATION ESOPH UNGUIDED SOUND/BOUGIE 1/MULT PASS N/A 09/28/2016    Procedure: DILATATION ESOPHAGEAL;  Surgeon: Mylene Moeller MD;  [facility]: [facility];   Service: Gastroenterology    MA ESOPHAGOGASTRODUODENOSCOPY TRANSORAL DIAGNOSTIC N/A 2016    Procedure: ESOPHAGOGASTRODUODENOSCOPY (EGD);  Surgeon: Mylene Moeller MD;  [facility];  Service: Gastroenterology    MA INSJ/RPLCMT SPINAL NPG/RCVR POCKET CRTJ&CONNJ Left 2018    Procedure: removal of left buttock implantable pulse generator and placement of new  implantable pulse generator;  Surgeon: Wood Rogel MD;  [facility]: [facility];  Service: Neurosurgery   [3]   Family History  Problem Relation Name Age of Onset    Lung cancer Mother  46    Pulmonary embolism Father      No Known Problems Sister Kathrine     No Known Problems Daughter rohit     No Known Problems Daughter hayley     Stroke Maternal Grandmother      Heart attack Maternal Grandfather      No Known Problems Paternal Grandmother      No Known Problems Paternal Grandfather      No Known Problems Maternal Aunt      Diabetes Family          Diabetes mellitus    Hypertension Family      Stroke Family          Stroke complications   [4]   Social History  Tobacco Use    Smoking status: Former     Current packs/day: 0.00     Types: Cigarettes     Quit date: 1970     Years since quittin.6    Smokeless tobacco: Never    Tobacco comments:     Denied history of current ever day smoker, Former smoker and Never smoker all documented in [employer]   Vaping Use    Vaping status: Never Used   Substance Use Topics    Alcohol use: Not Currently     Comment: Denied history of alcohol use    Drug use: No     Comment: Denied history of drug use        Marly Cleary DO  25 [phone]

## 2025-07-25 NOTE — ED NOTES
Pt's transport to return to facility booked by RN at this time via roundtrip.  time scheduled for 0330 via [facility]     Christine Baker RN  07/24/25 [id number]       Christine Baker RN  07/25/25 [id number]

## 2025-07-27 ENCOUNTER — ANESTHESIA (OUTPATIENT)
Dept: ANESTHESIOLOGY | Facility: HOSPITAL | Age: 80
End: 2025-07-27

## 2025-07-27 ENCOUNTER — ANESTHESIA EVENT (OUTPATIENT)
Dept: ANESTHESIOLOGY | Facility: HOSPITAL | Age: 80
End: 2025-07-27

## 2025-07-27 ENCOUNTER — DOCUMENTATION (OUTPATIENT)
Dept: FAMILY MEDICINE CLINIC | Facility: CLINIC | Age: 80
End: 2025-07-27

## 2025-07-28 ENCOUNTER — ANESTHESIA EVENT (OUTPATIENT)
Dept: GASTROENTEROLOGY | Facility: HOSPITAL | Age: 80
End: 2025-07-28
Payer: MEDICARE

## 2025-07-28 ENCOUNTER — HOSPITAL ENCOUNTER (OUTPATIENT)
Dept: GASTROENTEROLOGY | Facility: HOSPITAL | Age: 80
Setting detail: OUTPATIENT SURGERY
Discharge: HOME | End: 2025-07-28
Attending: INTERNAL MEDICINE
Payer: MEDICARE

## 2025-07-28 ENCOUNTER — ANESTHESIA (OUTPATIENT)
Dept: GASTROENTEROLOGY | Facility: HOSPITAL | Age: 80
End: 2025-07-28
Payer: MEDICARE

## 2025-07-28 VITALS
HEIGHT: 60 IN | SYSTOLIC BLOOD PRESSURE: 141 MMHG | TEMPERATURE: 98 F | RESPIRATION RATE: 15 BRPM | HEART RATE: 68 BPM | DIASTOLIC BLOOD PRESSURE: 66 MMHG | BODY MASS INDEX: 34.44 KG/M2 | OXYGEN SATURATION: 99 %

## 2025-07-28 DIAGNOSIS — K20.90 ESOPHAGITIS: ICD-10-CM

## 2025-07-28 DIAGNOSIS — R14.2 BURPING: ICD-10-CM

## 2025-07-28 DIAGNOSIS — R93.3 ABNORMAL CT SCAN, STOMACH: ICD-10-CM

## 2025-07-28 DIAGNOSIS — K30 DELAYED GASTRIC EMPTYING: Primary | ICD-10-CM

## 2025-07-28 DIAGNOSIS — R68.81 EARLY SATIETY: ICD-10-CM

## 2025-07-28 DIAGNOSIS — K21.9 CHRONIC GERD: ICD-10-CM

## 2025-07-28 LAB — GLUCOSE SERPL-MCNC: 132 MG/DL (ref 65–140)

## 2025-07-28 PROCEDURE — 88305 TISSUE EXAM BY PATHOLOGIST: CPT | Performed by: STUDENT IN AN ORGANIZED HEALTH CARE EDUCATION/TRAINING PROGRAM

## 2025-07-28 PROCEDURE — 88341 IMHCHEM/IMCYTCHM EA ADD ANTB: CPT | Performed by: STUDENT IN AN ORGANIZED HEALTH CARE EDUCATION/TRAINING PROGRAM

## 2025-07-28 PROCEDURE — 88342 IMHCHEM/IMCYTCHM 1ST ANTB: CPT | Performed by: STUDENT IN AN ORGANIZED HEALTH CARE EDUCATION/TRAINING PROGRAM

## 2025-07-28 PROCEDURE — 82948 REAGENT STRIP/BLOOD GLUCOSE: CPT

## 2025-07-28 RX ORDER — OMEPRAZOLE 40 MG/1
40 CAPSULE, DELAYED RELEASE ORAL
Qty: 60 CAPSULE | Refills: 5 | Status: SHIPPED | OUTPATIENT
Start: 2025-07-28 | End: 2026-01-24

## 2025-07-28 RX ORDER — GUAIFENESIN 600 MG/1
600 TABLET, EXTENDED RELEASE ORAL DAILY
COMMUNITY

## 2025-07-28 RX ORDER — PHENYLEPHRINE HCL IN 0.9% NACL 1 MG/10 ML
SYRINGE (ML) INTRAVENOUS AS NEEDED
Status: DISCONTINUED | OUTPATIENT
Start: 2025-07-28 | End: 2025-07-28

## 2025-07-28 RX ORDER — PROPOFOL 10 MG/ML
INJECTION, EMULSION INTRAVENOUS AS NEEDED
Status: DISCONTINUED | OUTPATIENT
Start: 2025-07-28 | End: 2025-07-28

## 2025-07-28 RX ORDER — ONDANSETRON 2 MG/ML
4 INJECTION INTRAMUSCULAR; INTRAVENOUS ONCE AS NEEDED
Status: DISCONTINUED | OUTPATIENT
Start: 2025-07-28 | End: 2025-07-28 | Stop reason: HOSPADM

## 2025-07-28 RX ORDER — SUCCINYLCHOLINE/SOD CL,ISO/PF 100 MG/5ML
SYRINGE (ML) INTRAVENOUS AS NEEDED
Status: DISCONTINUED | OUTPATIENT
Start: 2025-07-28 | End: 2025-07-28

## 2025-07-28 RX ORDER — SODIUM CHLORIDE, SODIUM LACTATE, POTASSIUM CHLORIDE, CALCIUM CHLORIDE 600; 310; 30; 20 MG/100ML; MG/100ML; MG/100ML; MG/100ML
125 INJECTION, SOLUTION INTRAVENOUS CONTINUOUS
Status: DISCONTINUED | OUTPATIENT
Start: 2025-07-28 | End: 2025-08-01 | Stop reason: HOSPADM

## 2025-07-28 RX ORDER — ALBUTEROL SULFATE 0.83 MG/ML
2.5 SOLUTION RESPIRATORY (INHALATION) ONCE AS NEEDED
Status: DISCONTINUED | OUTPATIENT
Start: 2025-07-28 | End: 2025-07-28 | Stop reason: HOSPADM

## 2025-07-28 RX ORDER — LIDOCAINE HYDROCHLORIDE 10 MG/ML
INJECTION, SOLUTION EPIDURAL; INFILTRATION; INTRACAUDAL; PERINEURAL AS NEEDED
Status: DISCONTINUED | OUTPATIENT
Start: 2025-07-28 | End: 2025-07-28

## 2025-07-28 RX ADMIN — SODIUM CHLORIDE, SODIUM LACTATE, POTASSIUM CHLORIDE, AND CALCIUM CHLORIDE 125 ML/HR: .6; .31; .03; .02 INJECTION, SOLUTION INTRAVENOUS at 08:59

## 2025-07-28 RX ADMIN — PROPOFOL 100 MG: 10 INJECTION, EMULSION INTRAVENOUS at 09:12

## 2025-07-28 RX ADMIN — Medication 100 MG: at 09:26

## 2025-07-28 RX ADMIN — LIDOCAINE HYDROCHLORIDE 50 MG: 10 INJECTION, SOLUTION EPIDURAL; INFILTRATION; INTRACAUDAL at 09:12

## 2025-07-28 RX ADMIN — Medication 150 MCG: at 09:31

## 2025-07-28 RX ADMIN — PROPOFOL 200 MG: 10 INJECTION, EMULSION INTRAVENOUS at 09:26

## 2025-07-28 NOTE — TELEPHONE ENCOUNTER
R/S to 8/21/25 at 1:00pm. Will notify pt and [facility] life of adjustment due to ahn exchange on 7/24/25 while inpatient.

## 2025-07-28 NOTE — TELEPHONE ENCOUNTER
Spoke to pt to let her know we r/s to 8/21/25 at 1:00.     Spoke to Yesenia from [facility]. They are aware we cancelled 7/29 and r/s to 8/21. Provided CB.    Pt is asking if there is anything that can be done regarding her cath changes. States that almost every 4 weeks she ends up readmitted before she is scheduled to come see us and is feeling defeated about having to go back to the hospital repeatedly. Routing to clinical to advise. Pt expecting a call back from us (clerical). Thank you

## 2025-07-29 NOTE — TELEPHONE ENCOUNTER
Spoke to pt and relayed message to be extra mindful of catheter placement and that concerns of timing of changes can be addressed at her appt. Pt is aware that transportation has been arranged.    INTERVAL HPI/OVERNIGHT EVENTS:  pt seen and examined  resting in bed  per rn no acute gi issues overnight  ct +for bleed    MEDICATIONS  (STANDING):  carbidopa/levodopa  25/100 2 Tablet(s) Oral <User Schedule>  docusate sodium 100 milliGRAM(s) Oral two times a day  entacapone 100 milliGRAM(s) Oral <User Schedule>  metoprolol tartrate 25 milliGRAM(s) Oral two times a day  pantoprazole   Suspension 40 milliGRAM(s) Oral before breakfast  senna 2 Tablet(s) Oral at bedtime  sertraline 50 milliGRAM(s) Oral daily  sucralfate suspension 1 Gram(s) Oral two times a day  tamsulosin 0.4 milliGRAM(s) Oral at bedtime    MEDICATIONS  (PRN):  acetaminophen  Suppository .. 650 milliGRAM(s) Rectal every 6 hours PRN Temp greater or equal to 38C (100.4F)      Allergies    No Known Allergies    Intolerances        Review of Systems:    unable to obtain      Vital Signs Last 24 Hrs  T(C): 37.2 (11 May 2019 08:00), Max: 37.2 (11 May 2019 08:00)  T(F): 99 (11 May 2019 08:00), Max: 99 (11 May 2019 08:00)  HR: 88 (11 May 2019 08:00) (76 - 101)  BP: 102/67 (11 May 2019 08:00) (93/61 - 128/87)  BP(mean): --  RR: 15 (11 May 2019 08:00) (15 - 17)  SpO2: 98% (11 May 2019 08:00) (90% - 100%)    PHYSICAL EXAM:    General:  nad  HEENT:  NC/AT  Chest:  dec bs  Cardiovascular:  Regular rhythm, S1, S2  Abdomen:  Soft, non-tender, non-distended  Extremities:  no edema  Skin:  No rash  Neuro/Psych:  awake alert        LABS:                        8.8    5.58  )-----------( 225      ( 11 May 2019 06:42 )             26.2     05-11    144  |  111<H>  |  12  ----------------------------<  93  3.1<L>   |  28  |  0.81    Ca    8.9      11 May 2019 06:42    TPro  5.7<L>  /  Alb  2.3<L>  /  TBili  0.8  /  DBili  x   /  AST  30  /  ALT  24  /  AlkPhos  47  05-11    PT/INR - ( 11 May 2019 06:42 )   PT: 17.0 sec;   INR: 1.49 ratio         PTT - ( 11 May 2019 06:42 )  PTT:31.7 sec      RADIOLOGY & ADDITIONAL TESTS:  < from: CT Abdomen and Pelvis No Cont (05.10.19 @ 13:58) >    EXAM:  CT ABDOMEN AND PELVIS                            PROCEDURE DATE:  05/10/2019          INTERPRETATION:  CLINICAL INFORMATION: Anemia, rule out retroperitoneal   bleed.    COMPARISON: 5/12/2014    PROCEDURE:   CT of the Abdomen and Pelvis was performed without intravenous contrast.   Intravenous contrast: None.  Oral contrast: None.  Sagittal and coronal reformats were performed.    FINDINGS:    LOWER CHEST: Small bilateral pleural effusions. Bibasilar atelectasis,   left greater than right. Coronary and aortic atherosclerosis. Mild   gynecomastia.    LIVER: Hepatic cysts and additional hypodensities too small to   characterize. Scattered coarse calcifications.  BILE DUCTS: Normal caliber.  GALLBLADDER: Cholelithiasis.  SPLEEN: Within normal limits.  PANCREAS: Within normal limits.  ADRENALS: Within normal limits.  KIDNEYS/URETERS: Multifocal areas of scarring within the left kidney with   dystrophic calcifications. No hydronephrosis or obstructing stones.    BLADDER: Within normallimits.  REPRODUCTIVE ORGANS: Mildly enlarged prostate.    BOWEL: No bowel obstruction. Appendix within normal limits.  PERITONEUM: No ascites.  VESSELS:  Atherosclerotic calcifications of the aorta and branch vessels.   Infrarenal IVC filter.  LYMPHNODES: No lymphadenopathy.    ABDOMINAL WALL: Small fat-containing umbilical hernia. Small   fat-containing right inguinal hernia.  BONES: No suspicious osseous lesion. Diffuse osteopenia. Degenerative   changes within the spine and pelvis.  OTHER:  Marked asymmetric enlargement of the right iliacus muscle with   internal hyperdensity compatible with hematoma. Moderate asymmetric   enlargement of the right psoas muscle with heterogeneous attenuation also   suggestive of hematoma. Trace free hemorrhagic fluid and stranding within   the right retroperitoneal space.    IMPRESSION: Right iliopsoas intramuscular hematoma and right trace free   retroperitoneal hemorrhage. Findings were discussed with Dr. Ramirez at   2:15PM on 5/11/19.                    GERALDO SAMANIEGO M.D., ATTENDING RADIOLOGIST  This document has been electronically signed. May 10 2019  2:19PM                < end of copied text >

## 2025-08-04 PROCEDURE — 88305 TISSUE EXAM BY PATHOLOGIST: CPT | Performed by: STUDENT IN AN ORGANIZED HEALTH CARE EDUCATION/TRAINING PROGRAM

## 2025-08-04 PROCEDURE — 88341 IMHCHEM/IMCYTCHM EA ADD ANTB: CPT | Performed by: STUDENT IN AN ORGANIZED HEALTH CARE EDUCATION/TRAINING PROGRAM

## 2025-08-04 PROCEDURE — 88342 IMHCHEM/IMCYTCHM 1ST ANTB: CPT | Performed by: STUDENT IN AN ORGANIZED HEALTH CARE EDUCATION/TRAINING PROGRAM

## 2025-08-07 ENCOUNTER — OFFICE VISIT (OUTPATIENT)
Dept: GASTROENTEROLOGY | Facility: CLINIC | Age: 80
End: 2025-08-07

## 2025-08-07 ENCOUNTER — PREP FOR PROCEDURE (OUTPATIENT)
Dept: GASTROENTEROLOGY | Facility: CLINIC | Age: 80
End: 2025-08-07

## 2025-08-07 VITALS
DIASTOLIC BLOOD PRESSURE: 78 MMHG | BODY MASS INDEX: 32 KG/M2 | WEIGHT: 163 LBS | SYSTOLIC BLOOD PRESSURE: 130 MMHG | TEMPERATURE: 98.2 F | HEIGHT: 60 IN

## 2025-08-07 DIAGNOSIS — G89.29 CHRONIC ABDOMINAL PAIN: ICD-10-CM

## 2025-08-07 DIAGNOSIS — K20.90 ESOPHAGITIS: ICD-10-CM

## 2025-08-07 DIAGNOSIS — R10.9 CHRONIC ABDOMINAL PAIN: ICD-10-CM

## 2025-08-07 DIAGNOSIS — K31.A0 GASTRIC INTESTINAL METAPLASIA: ICD-10-CM

## 2025-08-07 DIAGNOSIS — K31.A0 GASTRIC INTESTINAL METAPLASIA: Primary | ICD-10-CM

## 2025-08-07 DIAGNOSIS — R68.81 EARLY SATIETY: ICD-10-CM

## 2025-08-07 DIAGNOSIS — R11.2 NAUSEA AND VOMITING, UNSPECIFIED VOMITING TYPE: ICD-10-CM

## 2025-08-07 DIAGNOSIS — K59.09 CHRONIC CONSTIPATION: Primary | ICD-10-CM

## 2025-08-07 DIAGNOSIS — K31.84 GASTROPARESIS: ICD-10-CM

## 2025-08-07 DIAGNOSIS — K21.9 CHRONIC GERD: ICD-10-CM

## 2025-08-07 RX ORDER — SODIUM CHLORIDE, SODIUM LACTATE, POTASSIUM CHLORIDE, CALCIUM CHLORIDE 600; 310; 30; 20 MG/100ML; MG/100ML; MG/100ML; MG/100ML
125 INJECTION, SOLUTION INTRAVENOUS CONTINUOUS
OUTPATIENT
Start: 2025-08-07

## 2025-08-07 RX ORDER — DOCUSATE SODIUM 100 MG/1
100 CAPSULE, LIQUID FILLED ORAL 2 TIMES DAILY
Qty: 60 CAPSULE | Refills: 5 | Status: ON HOLD | OUTPATIENT
Start: 2025-08-07 | End: 2026-02-03

## 2025-08-12 ENCOUNTER — TELEPHONE (OUTPATIENT)
Dept: GASTROENTEROLOGY | Facility: CLINIC | Age: 80
End: 2025-08-12

## 2025-08-12 ENCOUNTER — HOSPITAL ENCOUNTER (INPATIENT)
Facility: HOSPITAL | Age: 80
LOS: 7 days | Discharge: NURSING, REHAB OR LONG-TERM CARE | DRG: 811 | End: 2025-08-21
Admitting: INTERNAL MEDICINE
Payer: MEDICARE

## 2025-08-12 ENCOUNTER — APPOINTMENT (EMERGENCY)
Dept: CT IMAGING | Facility: HOSPITAL | Age: 80
DRG: 811 | End: 2025-08-12
Payer: MEDICARE

## 2025-08-12 ENCOUNTER — APPOINTMENT (EMERGENCY)
Dept: RADIOLOGY | Facility: HOSPITAL | Age: 80
DRG: 811 | End: 2025-08-12
Payer: MEDICARE

## 2025-08-12 DIAGNOSIS — D50.0 IRON DEFICIENCY ANEMIA DUE TO CHRONIC BLOOD LOSS: ICD-10-CM

## 2025-08-12 DIAGNOSIS — K20.90 ESOPHAGITIS: ICD-10-CM

## 2025-08-12 DIAGNOSIS — K30 DELAYED GASTRIC EMPTYING: ICD-10-CM

## 2025-08-12 DIAGNOSIS — R55 SYNCOPE: Primary | ICD-10-CM

## 2025-08-12 DIAGNOSIS — D50.8 IRON DEFICIENCY ANEMIA SECONDARY TO INADEQUATE DIETARY IRON INTAKE: ICD-10-CM

## 2025-08-12 DIAGNOSIS — R10.32 LEFT LOWER QUADRANT ABDOMINAL PAIN: ICD-10-CM

## 2025-08-12 LAB
2HR DELTA HS TROPONIN: 4 NG/L
ALBUMIN SERPL BCG-MCNC: 3 G/DL (ref 3.5–5)
ALP SERPL-CCNC: 116 U/L (ref 34–104)
ALT SERPL W P-5'-P-CCNC: 10 U/L (ref 7–52)
ANION GAP SERPL CALCULATED.3IONS-SCNC: 9 MMOL/L (ref 4–13)
AST SERPL W P-5'-P-CCNC: 12 U/L (ref 13–39)
ATRIAL RATE: 92 BPM
BACTERIA UR QL AUTO: ABNORMAL /HPF
BASOPHILS # BLD AUTO: 0.06 THOUSANDS/ÂΜL (ref 0–0.1)
BASOPHILS NFR BLD AUTO: 1 % (ref 0–1)
BILIRUB SERPL-MCNC: 0.25 MG/DL (ref 0.2–1)
BILIRUB UR QL STRIP: NEGATIVE
BUN SERPL-MCNC: 44 MG/DL (ref 5–25)
CALCIUM ALBUM COR SERPL-MCNC: 9.5 MG/DL (ref 8.3–10.1)
CALCIUM SERPL-MCNC: 8.7 MG/DL (ref 8.4–10.2)
CARDIAC TROPONIN I PNL SERPL HS: 13 NG/L (ref ?–50)
CARDIAC TROPONIN I PNL SERPL HS: 17 NG/L (ref ?–50)
CHLORIDE SERPL-SCNC: 96 MMOL/L (ref 96–108)
CLARITY UR: ABNORMAL
CO2 SERPL-SCNC: 32 MMOL/L (ref 21–32)
COLOR UR: ABNORMAL
CREAT SERPL-MCNC: 1.46 MG/DL (ref 0.6–1.3)
D DIMER PPP FEU-MCNC: 0.58 UG/ML FEU
EOSINOPHIL # BLD AUTO: 0.09 THOUSAND/ÂΜL (ref 0–0.61)
EOSINOPHIL NFR BLD AUTO: 1 % (ref 0–6)
ERYTHROCYTE [DISTWIDTH] IN BLOOD BY AUTOMATED COUNT: 14.6 % (ref 11.6–15.1)
FLUAV AG UPPER RESP QL IA.RAPID: NEGATIVE
FLUBV AG UPPER RESP QL IA.RAPID: NEGATIVE
GFR SERPL CREATININE-BSD FRML MDRD: 33 ML/MIN/1.73SQ M
GLUCOSE SERPL-MCNC: 300 MG/DL (ref 65–140)
GLUCOSE UR STRIP-MCNC: NEGATIVE MG/DL
HCT VFR BLD AUTO: 28.4 % (ref 34.8–46.1)
HGB BLD-MCNC: 8.7 G/DL (ref 11.5–15.4)
HGB UR QL STRIP.AUTO: NEGATIVE
HYALINE CASTS #/AREA URNS LPF: ABNORMAL /LPF
IMM GRANULOCYTES # BLD AUTO: 0.04 THOUSAND/UL (ref 0–0.2)
IMM GRANULOCYTES NFR BLD AUTO: 0 % (ref 0–2)
KETONES UR STRIP-MCNC: ABNORMAL MG/DL
LEUKOCYTE ESTERASE UR QL STRIP: ABNORMAL
LIPASE SERPL-CCNC: 16 U/L (ref 11–82)
LYMPHOCYTES # BLD AUTO: 1.36 THOUSANDS/ÂΜL (ref 0.6–4.47)
LYMPHOCYTES NFR BLD AUTO: 14 % (ref 14–44)
MCH RBC QN AUTO: 28.3 PG (ref 26.8–34.3)
MCHC RBC AUTO-ENTMCNC: 30.6 G/DL (ref 31.4–37.4)
MCV RBC AUTO: 93 FL (ref 82–98)
MONOCYTES # BLD AUTO: 0.52 THOUSAND/ÂΜL (ref 0.17–1.22)
MONOCYTES NFR BLD AUTO: 5 % (ref 4–12)
NEUTROPHILS # BLD AUTO: 7.83 THOUSANDS/ÂΜL (ref 1.85–7.62)
NEUTS SEG NFR BLD AUTO: 79 % (ref 43–75)
NITRITE UR QL STRIP: NEGATIVE
NON-SQ EPI CELLS URNS QL MICRO: ABNORMAL /HPF
NRBC BLD AUTO-RTO: 0 /100 WBCS
P AXIS: 34 DEGREES
PH UR STRIP.AUTO: 6 [PH]
PLATELET # BLD AUTO: 278 THOUSANDS/UL (ref 149–390)
PMV BLD AUTO: 11.2 FL (ref 8.9–12.7)
POTASSIUM SERPL-SCNC: 4.3 MMOL/L (ref 3.5–5.3)
PR INTERVAL: 144 MS
PROT SERPL-MCNC: 5.5 G/DL (ref 6.4–8.4)
PROT UR STRIP-MCNC: NEGATIVE MG/DL
QRS AXIS: -32 DEGREES
QRSD INTERVAL: 126 MS
QT INTERVAL: 438 MS
QTC INTERVAL: 541 MS
RBC # BLD AUTO: 3.07 MILLION/UL (ref 3.81–5.12)
RBC #/AREA URNS AUTO: ABNORMAL /HPF
SARS-COV+SARS-COV-2 AG RESP QL IA.RAPID: NEGATIVE
SODIUM SERPL-SCNC: 137 MMOL/L (ref 135–147)
SP GR UR STRIP.AUTO: 1.02 (ref 1–1.03)
T WAVE AXIS: 47 DEGREES
UROBILINOGEN UR STRIP-ACNC: <2 MG/DL
VENTRICULAR RATE: 92 BPM
WBC # BLD AUTO: 9.9 THOUSAND/UL (ref 4.31–10.16)
WBC #/AREA URNS AUTO: ABNORMAL /HPF
WBC CLUMPS # UR AUTO: PRESENT /UL

## 2025-08-12 PROCEDURE — 85025 COMPLETE CBC W/AUTO DIFF WBC: CPT

## 2025-08-12 PROCEDURE — 87186 SC STD MICRODIL/AGAR DIL: CPT

## 2025-08-12 PROCEDURE — 93010 ELECTROCARDIOGRAM REPORT: CPT | Performed by: STUDENT IN AN ORGANIZED HEALTH CARE EDUCATION/TRAINING PROGRAM

## 2025-08-12 PROCEDURE — 84484 ASSAY OF TROPONIN QUANT: CPT

## 2025-08-12 PROCEDURE — 87804 INFLUENZA ASSAY W/OPTIC: CPT

## 2025-08-12 PROCEDURE — 71046 X-RAY EXAM CHEST 2 VIEWS: CPT

## 2025-08-12 PROCEDURE — 96374 THER/PROPH/DIAG INJ IV PUSH: CPT

## 2025-08-12 PROCEDURE — 87811 SARS-COV-2 COVID19 W/OPTIC: CPT

## 2025-08-12 PROCEDURE — 99285 EMERGENCY DEPT VISIT HI MDM: CPT

## 2025-08-12 PROCEDURE — 74176 CT ABD & PELVIS W/O CONTRAST: CPT

## 2025-08-12 PROCEDURE — 96361 HYDRATE IV INFUSION ADD-ON: CPT

## 2025-08-12 PROCEDURE — 36415 COLL VENOUS BLD VENIPUNCTURE: CPT

## 2025-08-12 PROCEDURE — 87077 CULTURE AEROBIC IDENTIFY: CPT

## 2025-08-12 PROCEDURE — 93005 ELECTROCARDIOGRAM TRACING: CPT

## 2025-08-12 PROCEDURE — 80053 COMPREHEN METABOLIC PANEL: CPT

## 2025-08-12 PROCEDURE — 81001 URINALYSIS AUTO W/SCOPE: CPT

## 2025-08-12 PROCEDURE — 87086 URINE CULTURE/COLONY COUNT: CPT

## 2025-08-12 PROCEDURE — 83690 ASSAY OF LIPASE: CPT

## 2025-08-12 PROCEDURE — 85379 FIBRIN DEGRADATION QUANT: CPT

## 2025-08-12 RX ORDER — ONDANSETRON 2 MG/ML
1 INJECTION INTRAMUSCULAR; INTRAVENOUS ONCE
Status: COMPLETED | OUTPATIENT
Start: 2025-08-12 | End: 2025-08-12

## 2025-08-12 RX ORDER — ONDANSETRON 2 MG/ML
1 INJECTION INTRAMUSCULAR; INTRAVENOUS ONCE
Status: DISCONTINUED | OUTPATIENT
Start: 2025-08-12 | End: 2025-08-12

## 2025-08-12 RX ORDER — METOCLOPRAMIDE HYDROCHLORIDE 5 MG/ML
10 INJECTION INTRAMUSCULAR; INTRAVENOUS ONCE
Status: COMPLETED | OUTPATIENT
Start: 2025-08-12 | End: 2025-08-12

## 2025-08-12 RX ADMIN — METOCLOPRAMIDE 10 MG: 5 INJECTION, SOLUTION INTRAMUSCULAR; INTRAVENOUS at 21:52

## 2025-08-12 RX ADMIN — SODIUM CHLORIDE 250 ML: 0.9 INJECTION, SOLUTION INTRAVENOUS at 21:53

## 2025-08-13 PROBLEM — R55 SYNCOPE AND COLLAPSE: Status: ACTIVE | Noted: 2025-08-13

## 2025-08-13 PROBLEM — G89.29 CHRONIC PAIN: Status: RESOLVED | Noted: 2025-05-15 | Resolved: 2025-08-13

## 2025-08-13 PROBLEM — K59.09 OTHER CONSTIPATION: Status: ACTIVE | Noted: 2024-06-05

## 2025-08-13 PROBLEM — R82.90 ABNORMAL URINALYSIS: Status: RESOLVED | Noted: 2023-08-27 | Resolved: 2025-08-13

## 2025-08-13 PROBLEM — K25.9 PREPYLORIC ULCER: Status: RESOLVED | Noted: 2021-05-11 | Resolved: 2025-08-13

## 2025-08-13 PROBLEM — M48.061 SPINAL STENOSIS OF LUMBAR REGION WITHOUT NEUROGENIC CLAUDICATION: Status: ACTIVE | Noted: 2025-08-13

## 2025-08-13 PROBLEM — N18.32 STAGE 3B CHRONIC KIDNEY DISEASE (HCC): Status: ACTIVE | Noted: 2025-05-15

## 2025-08-13 PROBLEM — E87.1 HYPONATREMIA: Status: RESOLVED | Noted: 2025-01-19 | Resolved: 2025-08-13

## 2025-08-13 PROBLEM — N17.9 AKI (ACUTE KIDNEY INJURY) (HCC): Status: RESOLVED | Noted: 2024-10-18 | Resolved: 2025-08-13

## 2025-08-13 PROBLEM — H57.12 EYE PAIN, LEFT: Status: RESOLVED | Noted: 2025-05-18 | Resolved: 2025-08-13

## 2025-08-13 PROBLEM — K59.00 CONSTIPATION: Chronic | Status: RESOLVED | Noted: 2024-06-05 | Resolved: 2025-08-13

## 2025-08-13 PROBLEM — R33.9 URINARY RETENTION: Status: RESOLVED | Noted: 2023-11-29 | Resolved: 2025-08-13

## 2025-08-13 PROBLEM — E87.6 HYPOKALEMIA: Status: RESOLVED | Noted: 2024-11-22 | Resolved: 2025-08-13
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
## 2025-08-13 PROBLEM — R82.71 BACTERIURIA: Status: RESOLVED | Noted: 2025-05-15 | Resolved: 2025-08-13

## 2025-08-13 PROBLEM — I83.018 VARICOSE VEINS OF RIGHT LOWER EXTREMITY WITH ULCER OTHER PART OF LOWER LEG (CODE) (HCC): Status: RESOLVED | Noted: 2025-01-31 | Resolved: 2025-08-13

## 2025-08-13 PROBLEM — R65.10 SIRS (SYSTEMIC INFLAMMATORY RESPONSE SYNDROME) (HCC): Status: RESOLVED | Noted: 2025-05-14 | Resolved: 2025-08-13

## 2025-08-13 LAB
ANION GAP SERPL CALCULATED.3IONS-SCNC: 5 MMOL/L (ref 4–13)
ATRIAL RATE: 86 BPM
ATRIAL RATE: 96 BPM
BUN SERPL-MCNC: 65 MG/DL (ref 5–25)
CALCIUM SERPL-MCNC: 8.6 MG/DL (ref 8.4–10.2)
CHLORIDE SERPL-SCNC: 97 MMOL/L (ref 96–108)
CO2 SERPL-SCNC: 35 MMOL/L (ref 21–32)
CREAT SERPL-MCNC: 1.47 MG/DL (ref 0.6–1.3)
ERYTHROCYTE [DISTWIDTH] IN BLOOD BY AUTOMATED COUNT: 14.6 % (ref 11.6–15.1)
EST. AVERAGE GLUCOSE BLD GHB EST-MCNC: 154 MG/DL
GFR SERPL CREATININE-BSD FRML MDRD: 33 ML/MIN/1.73SQ M
GLUCOSE P FAST SERPL-MCNC: 156 MG/DL (ref 65–99)
GLUCOSE SERPL-MCNC: 152 MG/DL (ref 65–140)
GLUCOSE SERPL-MCNC: 156 MG/DL (ref 65–140)
GLUCOSE SERPL-MCNC: 157 MG/DL (ref 65–140)
GLUCOSE SERPL-MCNC: 159 MG/DL (ref 65–140)
GLUCOSE SERPL-MCNC: 192 MG/DL (ref 65–140)
GLUCOSE SERPL-MCNC: 224 MG/DL (ref 65–140)
HBA1C MFR BLD: 7 %
HCT VFR BLD AUTO: 26.2 % (ref 34.8–46.1)
HGB BLD-MCNC: 8.4 G/DL (ref 11.5–15.4)
MCH RBC QN AUTO: 29.2 PG (ref 26.8–34.3)
MCHC RBC AUTO-ENTMCNC: 32.1 G/DL (ref 31.4–37.4)
MCV RBC AUTO: 91 FL (ref 82–98)
P AXIS: 33 DEGREES
P AXIS: 37 DEGREES
PLATELET # BLD AUTO: 263 THOUSANDS/UL (ref 149–390)
PMV BLD AUTO: 10.8 FL (ref 8.9–12.7)
POTASSIUM SERPL-SCNC: 4.7 MMOL/L (ref 3.5–5.3)
PR INTERVAL: 136 MS
PR INTERVAL: 138 MS
QRS AXIS: -27 DEGREES
QRS AXIS: -38 DEGREES
QRSD INTERVAL: 122 MS
QRSD INTERVAL: 126 MS
QT INTERVAL: 414 MS
QT INTERVAL: 452 MS
QTC INTERVAL: 524 MS
QTC INTERVAL: 540 MS
RBC # BLD AUTO: 2.88 MILLION/UL (ref 3.81–5.12)
SODIUM SERPL-SCNC: 137 MMOL/L (ref 135–147)
T WAVE AXIS: 49 DEGREES
T WAVE AXIS: 92 DEGREES
VENTRICULAR RATE: 86 BPM
VENTRICULAR RATE: 96 BPM
WBC # BLD AUTO: 9.74 THOUSAND/UL (ref 4.31–10.16)

## 2025-08-13 PROCEDURE — 93010 ELECTROCARDIOGRAM REPORT: CPT | Performed by: INTERNAL MEDICINE

## 2025-08-13 PROCEDURE — 93005 ELECTROCARDIOGRAM TRACING: CPT

## 2025-08-13 PROCEDURE — 93010 ELECTROCARDIOGRAM REPORT: CPT | Performed by: STUDENT IN AN ORGANIZED HEALTH CARE EDUCATION/TRAINING PROGRAM

## 2025-08-13 PROCEDURE — 82948 REAGENT STRIP/BLOOD GLUCOSE: CPT

## 2025-08-13 PROCEDURE — 97166 OT EVAL MOD COMPLEX 45 MIN: CPT

## 2025-08-13 PROCEDURE — 97163 PT EVAL HIGH COMPLEX 45 MIN: CPT

## 2025-08-13 PROCEDURE — 83036 HEMOGLOBIN GLYCOSYLATED A1C: CPT

## 2025-08-13 PROCEDURE — 99223 1ST HOSP IP/OBS HIGH 75: CPT | Performed by: INTERNAL MEDICINE

## 2025-08-13 PROCEDURE — 80048 BASIC METABOLIC PNL TOTAL CA: CPT

## 2025-08-13 PROCEDURE — 85027 COMPLETE CBC AUTOMATED: CPT

## 2025-08-13 RX ORDER — BENZONATATE 100 MG/1
100 CAPSULE ORAL 3 TIMES DAILY PRN
COMMUNITY

## 2025-08-13 RX ORDER — TORSEMIDE 20 MG/1
40 TABLET ORAL 2 TIMES DAILY
Status: DISCONTINUED | OUTPATIENT
Start: 2025-08-13 | End: 2025-08-20

## 2025-08-13 RX ORDER — ENOXAPARIN SODIUM 100 MG/ML
30 INJECTION SUBCUTANEOUS DAILY
Status: DISCONTINUED | OUTPATIENT
Start: 2025-08-13 | End: 2025-08-14

## 2025-08-13 RX ORDER — GUAIFENESIN 600 MG/1
1200 TABLET, EXTENDED RELEASE ORAL EVERY 12 HOURS SCHEDULED
Status: DISCONTINUED | OUTPATIENT
Start: 2025-08-13 | End: 2025-08-21 | Stop reason: HOSPADM

## 2025-08-13 RX ORDER — ALBUTEROL SULFATE 90 UG/1
2 INHALANT RESPIRATORY (INHALATION) EVERY 6 HOURS PRN
Status: DISCONTINUED | OUTPATIENT
Start: 2025-08-13 | End: 2025-08-21 | Stop reason: HOSPADM

## 2025-08-13 RX ORDER — TORSEMIDE 20 MG/1
20 TABLET ORAL AS NEEDED
COMMUNITY

## 2025-08-13 RX ORDER — ONDANSETRON 2 MG/ML
4 INJECTION INTRAMUSCULAR; INTRAVENOUS EVERY 6 HOURS PRN
Status: DISCONTINUED | OUTPATIENT
Start: 2025-08-13 | End: 2025-08-21 | Stop reason: HOSPADM

## 2025-08-13 RX ORDER — MORPHINE SULFATE 15 MG/1
15 TABLET ORAL EVERY 6 HOURS PRN
Refills: 0 | Status: DISCONTINUED | OUTPATIENT
Start: 2025-08-13 | End: 2025-08-21 | Stop reason: HOSPADM

## 2025-08-13 RX ORDER — ASPIRIN 81 MG/1
81 TABLET, CHEWABLE ORAL DAILY
Status: DISCONTINUED | OUTPATIENT
Start: 2025-08-13 | End: 2025-08-14

## 2025-08-13 RX ORDER — MECLIZINE HYDROCHLORIDE 25 MG/1
25 TABLET ORAL AS NEEDED
COMMUNITY

## 2025-08-13 RX ORDER — POLYETHYLENE GLYCOL 3350 17 G/17G
17 POWDER, FOR SOLUTION ORAL DAILY
Status: DISCONTINUED | OUTPATIENT
Start: 2025-08-13 | End: 2025-08-17

## 2025-08-13 RX ORDER — LEVOTHYROXINE SODIUM 75 UG/1
37.5 TABLET ORAL
Status: DISCONTINUED | OUTPATIENT
Start: 2025-08-13 | End: 2025-08-21 | Stop reason: HOSPADM

## 2025-08-13 RX ORDER — INSULIN LISPRO 100 [IU]/ML
1-5 INJECTION, SOLUTION INTRAVENOUS; SUBCUTANEOUS
Status: DISCONTINUED | OUTPATIENT
Start: 2025-08-13 | End: 2025-08-21 | Stop reason: HOSPADM

## 2025-08-13 RX ORDER — ZOLPIDEM TARTRATE 5 MG/1
5 TABLET ORAL
Status: DISCONTINUED | OUTPATIENT
Start: 2025-08-13 | End: 2025-08-21 | Stop reason: HOSPADM

## 2025-08-13 RX ORDER — METHOCARBAMOL 500 MG/1
500 TABLET, FILM COATED ORAL EVERY 8 HOURS PRN
Status: DISCONTINUED | OUTPATIENT
Start: 2025-08-13 | End: 2025-08-21 | Stop reason: HOSPADM

## 2025-08-13 RX ORDER — ATORVASTATIN CALCIUM 40 MG/1
40 TABLET, FILM COATED ORAL DAILY
Status: DISCONTINUED | OUTPATIENT
Start: 2025-08-13 | End: 2025-08-21 | Stop reason: HOSPADM

## 2025-08-13 RX ORDER — METOPROLOL SUCCINATE 25 MG/1
12.5 TABLET, EXTENDED RELEASE ORAL DAILY
Status: DISCONTINUED | OUTPATIENT
Start: 2025-08-13 | End: 2025-08-21 | Stop reason: HOSPADM

## 2025-08-13 RX ORDER — CHOLECALCIFEROL (VITAMIN D3) 25 MCG
3 TABLET ORAL AS NEEDED
COMMUNITY

## 2025-08-13 RX ORDER — SUCRALFATE 1 G/1
1 TABLET ORAL 2 TIMES DAILY PRN
COMMUNITY

## 2025-08-13 RX ORDER — MAGNESIUM HYDROXIDE/ALUMINUM HYDROXICE/SIMETHICONE 120; 1200; 1200 MG/30ML; MG/30ML; MG/30ML
30 SUSPENSION ORAL EVERY 6 HOURS PRN
Status: DISCONTINUED | OUTPATIENT
Start: 2025-08-13 | End: 2025-08-21 | Stop reason: HOSPADM

## 2025-08-13 RX ORDER — ACETAMINOPHEN 325 MG/1
975 TABLET ORAL EVERY 8 HOURS PRN
Status: DISCONTINUED | OUTPATIENT
Start: 2025-08-13 | End: 2025-08-21 | Stop reason: HOSPADM

## 2025-08-13 RX ADMIN — GUAIFENESIN 1200 MG: 600 TABLET ORAL at 15:05

## 2025-08-13 RX ADMIN — METHOCARBAMOL 500 MG: 500 TABLET ORAL at 18:01

## 2025-08-13 RX ADMIN — MORPHINE SULFATE 15 MG: 15 TABLET ORAL at 08:24

## 2025-08-13 RX ADMIN — ALUMINUM HYDROXIDE, MAGNESIUM HYDROXIDE, AND DIMETHICONE 30 ML: 200; 20; 200 SUSPENSION ORAL at 20:26

## 2025-08-13 RX ADMIN — INSULIN LISPRO 1 UNITS: 100 INJECTION, SOLUTION INTRAVENOUS; SUBCUTANEOUS at 08:26

## 2025-08-13 RX ADMIN — ASPIRIN 81 MG CHEWABLE TABLET 81 MG: 81 TABLET CHEWABLE at 08:24

## 2025-08-13 RX ADMIN — INSULIN LISPRO 2 UNITS: 100 INJECTION, SOLUTION INTRAVENOUS; SUBCUTANEOUS at 12:50

## 2025-08-13 RX ADMIN — POLYETHYLENE GLYCOL 3350 17 G: 17 POWDER, FOR SOLUTION ORAL at 08:18

## 2025-08-13 RX ADMIN — INSULIN LISPRO 1 UNITS: 100 INJECTION, SOLUTION INTRAVENOUS; SUBCUTANEOUS at 17:53

## 2025-08-13 RX ADMIN — MORPHINE SULFATE 15 MG: 15 TABLET ORAL at 15:05

## 2025-08-13 RX ADMIN — ONDANSETRON 4 MG: 2 INJECTION INTRAMUSCULAR; INTRAVENOUS at 21:59

## 2025-08-13 RX ADMIN — ATORVASTATIN CALCIUM 40 MG: 40 TABLET, FILM COATED ORAL at 08:24

## 2025-08-13 RX ADMIN — ACETAMINOPHEN 975 MG: 325 TABLET ORAL at 12:58

## 2025-08-13 RX ADMIN — IRON SUCROSE 300 MG: 20 INJECTION, SOLUTION INTRAVENOUS at 13:00

## 2025-08-13 RX ADMIN — ACETAMINOPHEN 975 MG: 325 TABLET ORAL at 20:26

## 2025-08-13 RX ADMIN — ENOXAPARIN SODIUM 30 MG: 30 INJECTION SUBCUTANEOUS at 08:28

## 2025-08-13 RX ADMIN — LEVOTHYROXINE SODIUM 37.5 MCG: 0.07 TABLET ORAL at 05:40

## 2025-08-13 RX ADMIN — INSULIN LISPRO 1 UNITS: 100 INJECTION, SOLUTION INTRAVENOUS; SUBCUTANEOUS at 21:57

## 2025-08-14 LAB
ABO GROUP BLD: NORMAL
ANION GAP SERPL CALCULATED.3IONS-SCNC: 10 MMOL/L (ref 4–13)
ATRIAL RATE: 92 BPM
BASOPHILS # BLD AUTO: 0.02 THOUSANDS/ÂΜL (ref 0–0.1)
BASOPHILS NFR BLD AUTO: 0 % (ref 0–1)
BLD GP AB SCN SERPL QL: NEGATIVE
BUN SERPL-MCNC: 72 MG/DL (ref 5–25)
CALCIUM SERPL-MCNC: 8.4 MG/DL (ref 8.4–10.2)
CHLORIDE SERPL-SCNC: 98 MMOL/L (ref 96–108)
CO2 SERPL-SCNC: 27 MMOL/L (ref 21–32)
CREAT SERPL-MCNC: 1.5 MG/DL (ref 0.6–1.3)
EOSINOPHIL # BLD AUTO: 0 THOUSAND/ÂΜL (ref 0–0.61)
EOSINOPHIL NFR BLD AUTO: 0 % (ref 0–6)
ERYTHROCYTE [DISTWIDTH] IN BLOOD BY AUTOMATED COUNT: 14.9 % (ref 11.6–15.1)
GFR SERPL CREATININE-BSD FRML MDRD: 32 ML/MIN/1.73SQ M
GLUCOSE SERPL-MCNC: 281 MG/DL (ref 65–140)
GLUCOSE SERPL-MCNC: 292 MG/DL (ref 65–140)
GLUCOSE SERPL-MCNC: 293 MG/DL (ref 65–140)
GLUCOSE SERPL-MCNC: 308 MG/DL (ref 65–140)
GLUCOSE SERPL-MCNC: 311 MG/DL (ref 65–140)
GLUCOSE SERPL-MCNC: 316 MG/DL (ref 65–140)
GLUCOSE SERPL-MCNC: 343 MG/DL (ref 65–140)
HCT VFR BLD AUTO: 17.3 % (ref 34.8–46.1)
HCT VFR BLD AUTO: 17.7 % (ref 34.8–46.1)
HGB BLD-MCNC: 5.6 G/DL (ref 11.5–15.4)
HGB BLD-MCNC: 5.7 G/DL (ref 11.5–15.4)
IMM GRANULOCYTES # BLD AUTO: 0.08 THOUSAND/UL (ref 0–0.2)
IMM GRANULOCYTES NFR BLD AUTO: 1 % (ref 0–2)
LYMPHOCYTES # BLD AUTO: 1.35 THOUSANDS/ÂΜL (ref 0.6–4.47)
LYMPHOCYTES NFR BLD AUTO: 10 % (ref 14–44)
MCH RBC QN AUTO: 28.9 PG (ref 26.8–34.3)
MCHC RBC AUTO-ENTMCNC: 32.2 G/DL (ref 31.4–37.4)
MCV RBC AUTO: 90 FL (ref 82–98)
MONOCYTES # BLD AUTO: 0.56 THOUSAND/ÂΜL (ref 0.17–1.22)
MONOCYTES NFR BLD AUTO: 4 % (ref 4–12)
NEUTROPHILS # BLD AUTO: 12.13 THOUSANDS/ÂΜL (ref 1.85–7.62)
NEUTS SEG NFR BLD AUTO: 85 % (ref 43–75)
NRBC BLD AUTO-RTO: 0 /100 WBCS
P AXIS: 4 DEGREES
PLATELET # BLD AUTO: 236 THOUSANDS/UL (ref 149–390)
PMV BLD AUTO: 11.2 FL (ref 8.9–12.7)
POTASSIUM SERPL-SCNC: 4.4 MMOL/L (ref 3.5–5.3)
PR INTERVAL: 122 MS
QRS AXIS: -34 DEGREES
QRSD INTERVAL: 124 MS
QT INTERVAL: 418 MS
QTC INTERVAL: 518 MS
RBC # BLD AUTO: 1.97 MILLION/UL (ref 3.81–5.12)
RH BLD: POSITIVE
SODIUM SERPL-SCNC: 135 MMOL/L (ref 135–147)
SPECIMEN EXPIRATION DATE: NORMAL
T WAVE AXIS: 92 DEGREES
VENTRICULAR RATE: 92 BPM
WBC # BLD AUTO: 14.14 THOUSAND/UL (ref 4.31–10.16)

## 2025-08-14 PROCEDURE — 80048 BASIC METABOLIC PNL TOTAL CA: CPT | Performed by: INTERNAL MEDICINE

## 2025-08-14 PROCEDURE — 99232 SBSQ HOSP IP/OBS MODERATE 35: CPT | Performed by: INTERNAL MEDICINE

## 2025-08-14 PROCEDURE — 97530 THERAPEUTIC ACTIVITIES: CPT

## 2025-08-14 PROCEDURE — 86900 BLOOD TYPING SEROLOGIC ABO: CPT | Performed by: INTERNAL MEDICINE

## 2025-08-14 PROCEDURE — 87081 CULTURE SCREEN ONLY: CPT | Performed by: INTERNAL MEDICINE

## 2025-08-14 PROCEDURE — 93005 ELECTROCARDIOGRAM TRACING: CPT

## 2025-08-14 PROCEDURE — P9016 RBC LEUKOCYTES REDUCED: HCPCS

## 2025-08-14 PROCEDURE — 86923 COMPATIBILITY TEST ELECTRIC: CPT

## 2025-08-14 PROCEDURE — 86901 BLOOD TYPING SEROLOGIC RH(D): CPT | Performed by: INTERNAL MEDICINE

## 2025-08-14 PROCEDURE — 85018 HEMOGLOBIN: CPT | Performed by: INTERNAL MEDICINE

## 2025-08-14 PROCEDURE — 93010 ELECTROCARDIOGRAM REPORT: CPT | Performed by: INTERNAL MEDICINE

## 2025-08-14 PROCEDURE — 85014 HEMATOCRIT: CPT | Performed by: INTERNAL MEDICINE

## 2025-08-14 PROCEDURE — 82948 REAGENT STRIP/BLOOD GLUCOSE: CPT

## 2025-08-14 PROCEDURE — 85025 COMPLETE CBC W/AUTO DIFF WBC: CPT | Performed by: INTERNAL MEDICINE

## 2025-08-14 PROCEDURE — 86850 RBC ANTIBODY SCREEN: CPT | Performed by: INTERNAL MEDICINE

## 2025-08-14 RX ORDER — SODIUM CHLORIDE 9 MG/ML
50 INJECTION, SOLUTION INTRAVENOUS CONTINUOUS
Status: DISCONTINUED | OUTPATIENT
Start: 2025-08-14 | End: 2025-08-17

## 2025-08-14 RX ORDER — NYSTATIN 100000 [USP'U]/G
POWDER TOPICAL 2 TIMES DAILY
Status: DISCONTINUED | OUTPATIENT
Start: 2025-08-14 | End: 2025-08-14

## 2025-08-14 RX ORDER — PANTOPRAZOLE SODIUM 40 MG/10ML
40 INJECTION, POWDER, LYOPHILIZED, FOR SOLUTION INTRAVENOUS EVERY 12 HOURS SCHEDULED
Status: DISCONTINUED | OUTPATIENT
Start: 2025-08-14 | End: 2025-08-20

## 2025-08-14 RX ORDER — INSULIN GLARGINE 100 [IU]/ML
10 INJECTION, SOLUTION SUBCUTANEOUS
Status: DISCONTINUED | OUTPATIENT
Start: 2025-08-14 | End: 2025-08-21 | Stop reason: HOSPADM

## 2025-08-14 RX ORDER — NYSTATIN 100000 [USP'U]/G
POWDER TOPICAL 2 TIMES DAILY
Status: DISCONTINUED | OUTPATIENT
Start: 2025-08-14 | End: 2025-08-21 | Stop reason: HOSPADM

## 2025-08-14 RX ADMIN — INSULIN GLARGINE 10 UNITS: 100 INJECTION, SOLUTION SUBCUTANEOUS at 21:52

## 2025-08-14 RX ADMIN — ASPIRIN 81 MG CHEWABLE TABLET 81 MG: 81 TABLET CHEWABLE at 08:31

## 2025-08-14 RX ADMIN — ACETAMINOPHEN 975 MG: 325 TABLET ORAL at 06:28

## 2025-08-14 RX ADMIN — METOPROLOL SUCCINATE 12.5 MG: 25 TABLET, EXTENDED RELEASE ORAL at 08:31

## 2025-08-14 RX ADMIN — INSULIN LISPRO 3 UNITS: 100 INJECTION, SOLUTION INTRAVENOUS; SUBCUTANEOUS at 12:22

## 2025-08-14 RX ADMIN — GUAIFENESIN 1200 MG: 600 TABLET ORAL at 21:52

## 2025-08-14 RX ADMIN — NYSTATIN: 100000 POWDER TOPICAL at 21:54

## 2025-08-14 RX ADMIN — ACETAMINOPHEN 975 MG: 325 TABLET ORAL at 21:52

## 2025-08-14 RX ADMIN — ATORVASTATIN CALCIUM 40 MG: 40 TABLET, FILM COATED ORAL at 08:31

## 2025-08-14 RX ADMIN — ENOXAPARIN SODIUM 30 MG: 30 INJECTION SUBCUTANEOUS at 08:38

## 2025-08-14 RX ADMIN — GUAIFENESIN 1200 MG: 600 TABLET ORAL at 08:31

## 2025-08-14 RX ADMIN — INSULIN LISPRO 3 UNITS: 100 INJECTION, SOLUTION INTRAVENOUS; SUBCUTANEOUS at 17:37

## 2025-08-14 RX ADMIN — INSULIN LISPRO 3 UNITS: 100 INJECTION, SOLUTION INTRAVENOUS; SUBCUTANEOUS at 08:32

## 2025-08-14 RX ADMIN — ONDANSETRON 4 MG: 2 INJECTION INTRAMUSCULAR; INTRAVENOUS at 08:38

## 2025-08-14 RX ADMIN — PANTOPRAZOLE SODIUM 40 MG: 40 INJECTION, POWDER, FOR SOLUTION INTRAVENOUS at 21:52

## 2025-08-14 RX ADMIN — POLYETHYLENE GLYCOL 3350 17 G: 17 POWDER, FOR SOLUTION ORAL at 08:23

## 2025-08-14 RX ADMIN — IRON SUCROSE 300 MG: 20 INJECTION, SOLUTION INTRAVENOUS at 08:33

## 2025-08-14 RX ADMIN — LEVOTHYROXINE SODIUM 37.5 MCG: 0.07 TABLET ORAL at 06:28

## 2025-08-14 RX ADMIN — INSULIN LISPRO 3 UNITS: 100 INJECTION, SOLUTION INTRAVENOUS; SUBCUTANEOUS at 21:52

## 2025-08-14 RX ADMIN — NYSTATIN: 100000 POWDER TOPICAL at 08:38

## 2025-08-15 ENCOUNTER — APPOINTMENT (INPATIENT)
Dept: RADIOLOGY | Facility: HOSPITAL | Age: 80
DRG: 811 | End: 2025-08-15
Payer: MEDICARE

## 2025-08-15 LAB
ABO GROUP BLD BPU: NORMAL
ABO GROUP BLD BPU: NORMAL
ATRIAL RATE: 108 BPM
BACTERIA UR CULT: ABNORMAL
BACTERIA UR CULT: ABNORMAL
BPU ID: NORMAL
BPU ID: NORMAL
CROSSMATCH: NORMAL
CROSSMATCH: NORMAL
GLUCOSE SERPL-MCNC: 150 MG/DL (ref 65–140)
GLUCOSE SERPL-MCNC: 186 MG/DL (ref 65–140)
GLUCOSE SERPL-MCNC: 190 MG/DL (ref 65–140)
GLUCOSE SERPL-MCNC: 223 MG/DL (ref 65–140)
HCT VFR BLD AUTO: 22.6 % (ref 34.8–46.1)
HCT VFR BLD AUTO: 25.8 % (ref 34.8–46.1)
HGB BLD-MCNC: 7.5 G/DL (ref 11.5–15.4)
HGB BLD-MCNC: 8.4 G/DL (ref 11.5–15.4)
MRSA NOSE QL CULT: NORMAL
P AXIS: 45 DEGREES
PR INTERVAL: 142 MS
QRS AXIS: -18 DEGREES
QRSD INTERVAL: 124 MS
QT INTERVAL: 390 MS
QTC INTERVAL: 523 MS
T WAVE AXIS: 95 DEGREES
UNIT DISPENSE STATUS: NORMAL
UNIT DISPENSE STATUS: NORMAL
UNIT PRODUCT CODE: NORMAL
UNIT PRODUCT CODE: NORMAL
UNIT PRODUCT VOLUME: 350 ML
UNIT PRODUCT VOLUME: 350 ML
UNIT RH: NORMAL
UNIT RH: NORMAL
VENTRICULAR RATE: 108 BPM

## 2025-08-15 PROCEDURE — 85018 HEMOGLOBIN: CPT | Performed by: INTERNAL MEDICINE

## 2025-08-15 PROCEDURE — 99222 1ST HOSP IP/OBS MODERATE 55: CPT | Performed by: INTERNAL MEDICINE

## 2025-08-15 PROCEDURE — 97530 THERAPEUTIC ACTIVITIES: CPT

## 2025-08-15 PROCEDURE — 71046 X-RAY EXAM CHEST 2 VIEWS: CPT

## 2025-08-15 PROCEDURE — 82948 REAGENT STRIP/BLOOD GLUCOSE: CPT

## 2025-08-15 PROCEDURE — 99232 SBSQ HOSP IP/OBS MODERATE 35: CPT | Performed by: INTERNAL MEDICINE

## 2025-08-15 PROCEDURE — 85014 HEMATOCRIT: CPT | Performed by: INTERNAL MEDICINE

## 2025-08-15 PROCEDURE — 97535 SELF CARE MNGMENT TRAINING: CPT

## 2025-08-15 PROCEDURE — 93010 ELECTROCARDIOGRAM REPORT: CPT | Performed by: INTERNAL MEDICINE

## 2025-08-15 RX ORDER — INSULIN GLARGINE 100 [IU]/ML
15 INJECTION, SOLUTION SUBCUTANEOUS
Status: CANCELLED | OUTPATIENT
Start: 2025-08-15

## 2025-08-15 RX ORDER — BISACODYL 5 MG/1
5 TABLET, DELAYED RELEASE ORAL DAILY PRN
Status: DISCONTINUED | OUTPATIENT
Start: 2025-08-15 | End: 2025-08-21 | Stop reason: HOSPADM

## 2025-08-15 RX ORDER — CALCIUM CARBONATE 500 MG/1
500 TABLET, CHEWABLE ORAL DAILY PRN
Status: DISCONTINUED | OUTPATIENT
Start: 2025-08-15 | End: 2025-08-21 | Stop reason: HOSPADM

## 2025-08-15 RX ORDER — SODIUM CHLORIDE 9 MG/ML
75 INJECTION, SOLUTION INTRAVENOUS CONTINUOUS
Status: CANCELLED | OUTPATIENT
Start: 2025-08-15

## 2025-08-15 RX ADMIN — PANTOPRAZOLE SODIUM 40 MG: 40 INJECTION, POWDER, FOR SOLUTION INTRAVENOUS at 08:31

## 2025-08-15 RX ADMIN — LEVOTHYROXINE SODIUM 37.5 MCG: 0.07 TABLET ORAL at 05:47

## 2025-08-15 RX ADMIN — INSULIN LISPRO 1 UNITS: 100 INJECTION, SOLUTION INTRAVENOUS; SUBCUTANEOUS at 21:44

## 2025-08-15 RX ADMIN — ACETAMINOPHEN 975 MG: 325 TABLET ORAL at 08:30

## 2025-08-15 RX ADMIN — GUAIFENESIN 1200 MG: 600 TABLET ORAL at 21:43

## 2025-08-15 RX ADMIN — SODIUM CHLORIDE 125 ML/HR: 0.9 INJECTION, SOLUTION INTRAVENOUS at 08:50

## 2025-08-15 RX ADMIN — PANTOPRAZOLE SODIUM 40 MG: 40 INJECTION, POWDER, FOR SOLUTION INTRAVENOUS at 21:43

## 2025-08-15 RX ADMIN — MORPHINE SULFATE 15 MG: 15 TABLET ORAL at 21:43

## 2025-08-15 RX ADMIN — ATORVASTATIN CALCIUM 40 MG: 40 TABLET, FILM COATED ORAL at 08:29

## 2025-08-15 RX ADMIN — MORPHINE SULFATE 15 MG: 15 TABLET ORAL at 05:47

## 2025-08-15 RX ADMIN — NYSTATIN: 100000 POWDER TOPICAL at 09:57

## 2025-08-15 RX ADMIN — INSULIN LISPRO 1 UNITS: 100 INJECTION, SOLUTION INTRAVENOUS; SUBCUTANEOUS at 17:05

## 2025-08-15 RX ADMIN — CALCIUM CARBONATE (ANTACID) CHEW TAB 500 MG 500 MG: 500 CHEW TAB at 21:46

## 2025-08-15 RX ADMIN — GUAIFENESIN 1200 MG: 600 TABLET ORAL at 08:29

## 2025-08-15 RX ADMIN — INSULIN LISPRO 2 UNITS: 100 INJECTION, SOLUTION INTRAVENOUS; SUBCUTANEOUS at 08:32

## 2025-08-15 RX ADMIN — METOPROLOL SUCCINATE 12.5 MG: 25 TABLET, EXTENDED RELEASE ORAL at 08:30

## 2025-08-15 RX ADMIN — MORPHINE SULFATE 15 MG: 15 TABLET ORAL at 13:35

## 2025-08-15 RX ADMIN — IRON SUCROSE 300 MG: 20 INJECTION, SOLUTION INTRAVENOUS at 08:57

## 2025-08-15 RX ADMIN — ZOLPIDEM TARTRATE 5 MG: 5 TABLET ORAL at 21:46

## 2025-08-15 RX ADMIN — ALUMINUM HYDROXIDE, MAGNESIUM HYDROXIDE, AND DIMETHICONE 30 ML: 200; 20; 200 SUSPENSION ORAL at 17:06

## 2025-08-15 RX ADMIN — INSULIN GLARGINE 10 UNITS: 100 INJECTION, SOLUTION SUBCUTANEOUS at 21:41

## 2025-08-15 RX ADMIN — METHOCARBAMOL 500 MG: 500 TABLET ORAL at 08:31

## 2025-08-15 RX ADMIN — SODIUM CHLORIDE 50 ML/HR: 0.9 INJECTION, SOLUTION INTRAVENOUS at 23:11

## 2025-08-15 RX ADMIN — CALCIUM CARBONATE (ANTACID) CHEW TAB 500 MG 500 MG: 500 CHEW TAB at 20:13

## 2025-08-15 RX ADMIN — SODIUM CHLORIDE 125 ML/HR: 0.9 INJECTION, SOLUTION INTRAVENOUS at 00:51

## 2025-08-15 RX ADMIN — NYSTATIN: 100000 POWDER TOPICAL at 17:06

## 2025-08-15 RX ADMIN — METHOCARBAMOL 500 MG: 500 TABLET ORAL at 18:14

## 2025-08-15 RX ADMIN — POLYETHYLENE GLYCOL 3350 17 G: 17 POWDER, FOR SOLUTION ORAL at 08:29

## 2025-08-16 PROBLEM — R23.0 EXTREMITY CYANOSIS: Status: ACTIVE | Noted: 2025-08-16

## 2025-08-16 PROBLEM — D50.0 IRON DEFICIENCY ANEMIA DUE TO CHRONIC BLOOD LOSS: Status: ACTIVE | Noted: 2025-08-16

## 2025-08-16 LAB
ANION GAP SERPL CALCULATED.3IONS-SCNC: 4 MMOL/L (ref 4–13)
BASOPHILS # BLD AUTO: 0.04 THOUSANDS/ÂΜL (ref 0–0.1)
BASOPHILS NFR BLD AUTO: 0 % (ref 0–1)
BUN SERPL-MCNC: 42 MG/DL (ref 5–25)
CALCIUM SERPL-MCNC: 8.2 MG/DL (ref 8.4–10.2)
CHLORIDE SERPL-SCNC: 110 MMOL/L (ref 96–108)
CO2 SERPL-SCNC: 28 MMOL/L (ref 21–32)
CREAT SERPL-MCNC: 1.12 MG/DL (ref 0.6–1.3)
EOSINOPHIL # BLD AUTO: 0.14 THOUSAND/ÂΜL (ref 0–0.61)
EOSINOPHIL NFR BLD AUTO: 1 % (ref 0–6)
ERYTHROCYTE [DISTWIDTH] IN BLOOD BY AUTOMATED COUNT: 17.4 % (ref 11.6–15.1)
GFR SERPL CREATININE-BSD FRML MDRD: 45 ML/MIN/1.73SQ M
GLUCOSE SERPL-MCNC: 140 MG/DL (ref 65–140)
GLUCOSE SERPL-MCNC: 142 MG/DL (ref 65–140)
GLUCOSE SERPL-MCNC: 152 MG/DL (ref 65–140)
GLUCOSE SERPL-MCNC: 155 MG/DL (ref 65–140)
GLUCOSE SERPL-MCNC: 97 MG/DL (ref 65–140)
HCT VFR BLD AUTO: 20.6 % (ref 34.8–46.1)
HCT VFR BLD AUTO: 21.2 % (ref 34.8–46.1)
HGB BLD-MCNC: 6.5 G/DL (ref 11.5–15.4)
HGB BLD-MCNC: 6.7 G/DL (ref 11.5–15.4)
IMM GRANULOCYTES # BLD AUTO: 0.1 THOUSAND/UL (ref 0–0.2)
IMM GRANULOCYTES NFR BLD AUTO: 1 % (ref 0–2)
LYMPHOCYTES # BLD AUTO: 2.11 THOUSANDS/ÂΜL (ref 0.6–4.47)
LYMPHOCYTES NFR BLD AUTO: 18 % (ref 14–44)
MCH RBC QN AUTO: 27.2 PG (ref 26.8–34.3)
MCHC RBC AUTO-ENTMCNC: 31.6 G/DL (ref 31.4–37.4)
MCV RBC AUTO: 86 FL (ref 82–98)
MONOCYTES # BLD AUTO: 1.04 THOUSAND/ÂΜL (ref 0.17–1.22)
MONOCYTES NFR BLD AUTO: 9 % (ref 4–12)
NEUTROPHILS # BLD AUTO: 8.22 THOUSANDS/ÂΜL (ref 1.85–7.62)
NEUTS SEG NFR BLD AUTO: 71 % (ref 43–75)
NRBC BLD AUTO-RTO: 1 /100 WBCS
PLATELET # BLD AUTO: 167 THOUSANDS/UL (ref 149–390)
PMV BLD AUTO: 10.6 FL (ref 8.9–12.7)
POTASSIUM SERPL-SCNC: 3.3 MMOL/L (ref 3.5–5.3)
RBC # BLD AUTO: 2.39 MILLION/UL (ref 3.81–5.12)
SODIUM SERPL-SCNC: 142 MMOL/L (ref 135–147)
WBC # BLD AUTO: 11.65 THOUSAND/UL (ref 4.31–10.16)

## 2025-08-16 PROCEDURE — 82948 REAGENT STRIP/BLOOD GLUCOSE: CPT

## 2025-08-16 PROCEDURE — P9016 RBC LEUKOCYTES REDUCED: HCPCS

## 2025-08-16 PROCEDURE — 85014 HEMATOCRIT: CPT | Performed by: INTERNAL MEDICINE

## 2025-08-16 PROCEDURE — 85025 COMPLETE CBC W/AUTO DIFF WBC: CPT | Performed by: INTERNAL MEDICINE

## 2025-08-16 PROCEDURE — 99232 SBSQ HOSP IP/OBS MODERATE 35: CPT | Performed by: INTERNAL MEDICINE

## 2025-08-16 PROCEDURE — 80048 BASIC METABOLIC PNL TOTAL CA: CPT | Performed by: INTERNAL MEDICINE

## 2025-08-16 PROCEDURE — 93005 ELECTROCARDIOGRAM TRACING: CPT

## 2025-08-16 PROCEDURE — 85018 HEMOGLOBIN: CPT | Performed by: INTERNAL MEDICINE

## 2025-08-16 RX ADMIN — ACETAMINOPHEN 975 MG: 325 TABLET ORAL at 15:49

## 2025-08-16 RX ADMIN — LEVOTHYROXINE SODIUM 37.5 MCG: 0.07 TABLET ORAL at 06:07

## 2025-08-16 RX ADMIN — ALUMINUM HYDROXIDE, MAGNESIUM HYDROXIDE, AND DIMETHICONE 30 ML: 200; 20; 200 SUSPENSION ORAL at 13:14

## 2025-08-16 RX ADMIN — ATORVASTATIN CALCIUM 40 MG: 40 TABLET, FILM COATED ORAL at 09:59

## 2025-08-16 RX ADMIN — ACETAMINOPHEN 975 MG: 325 TABLET ORAL at 06:07

## 2025-08-16 RX ADMIN — METHOCARBAMOL 500 MG: 500 TABLET ORAL at 22:30

## 2025-08-16 RX ADMIN — NYSTATIN 1 APPLICATION: 100000 POWDER TOPICAL at 16:15

## 2025-08-16 RX ADMIN — POLYETHYLENE GLYCOL 3350 17 G: 17 POWDER, FOR SOLUTION ORAL at 09:58

## 2025-08-16 RX ADMIN — INSULIN GLARGINE 10 UNITS: 100 INJECTION, SOLUTION SUBCUTANEOUS at 21:00

## 2025-08-16 RX ADMIN — PANTOPRAZOLE SODIUM 40 MG: 40 INJECTION, POWDER, FOR SOLUTION INTRAVENOUS at 21:00

## 2025-08-16 RX ADMIN — METOPROLOL SUCCINATE 12.5 MG: 25 TABLET, EXTENDED RELEASE ORAL at 09:59

## 2025-08-16 RX ADMIN — GUAIFENESIN 1200 MG: 600 TABLET ORAL at 21:00

## 2025-08-16 RX ADMIN — CALCIUM CARBONATE (ANTACID) CHEW TAB 500 MG 500 MG: 500 CHEW TAB at 18:37

## 2025-08-16 RX ADMIN — INSULIN LISPRO 1 UNITS: 100 INJECTION, SOLUTION INTRAVENOUS; SUBCUTANEOUS at 13:31

## 2025-08-16 RX ADMIN — PANTOPRAZOLE SODIUM 40 MG: 40 INJECTION, POWDER, FOR SOLUTION INTRAVENOUS at 09:59

## 2025-08-16 RX ADMIN — METHOCARBAMOL 500 MG: 500 TABLET ORAL at 14:08

## 2025-08-16 RX ADMIN — CALCIUM CARBONATE (ANTACID) CHEW TAB 500 MG 500 MG: 500 CHEW TAB at 06:07

## 2025-08-16 RX ADMIN — MORPHINE SULFATE 15 MG: 15 TABLET ORAL at 22:30

## 2025-08-16 RX ADMIN — GUAIFENESIN 1200 MG: 600 TABLET ORAL at 09:59

## 2025-08-16 RX ADMIN — NYSTATIN: 100000 POWDER TOPICAL at 09:59

## 2025-08-16 RX ADMIN — SODIUM CHLORIDE 50 ML/HR: 0.9 INJECTION, SOLUTION INTRAVENOUS at 17:00

## 2025-08-16 RX ADMIN — MORPHINE SULFATE 15 MG: 15 TABLET ORAL at 14:26

## 2025-08-16 RX ADMIN — INSULIN LISPRO 1 UNITS: 100 INJECTION, SOLUTION INTRAVENOUS; SUBCUTANEOUS at 21:00

## 2025-08-17 LAB
ABO GROUP BLD BPU: NORMAL
ANION GAP SERPL CALCULATED.3IONS-SCNC: 4 MMOL/L (ref 4–13)
ATRIAL RATE: 100 BPM
BASOPHILS # BLD AUTO: 0.04 THOUSANDS/ÂΜL (ref 0–0.1)
BASOPHILS NFR BLD AUTO: 1 % (ref 0–1)
BPU ID: NORMAL
BUN SERPL-MCNC: 25 MG/DL (ref 5–25)
CALCIUM SERPL-MCNC: 8.4 MG/DL (ref 8.4–10.2)
CHLORIDE SERPL-SCNC: 112 MMOL/L (ref 96–108)
CO2 SERPL-SCNC: 27 MMOL/L (ref 21–32)
CREAT SERPL-MCNC: 1.01 MG/DL (ref 0.6–1.3)
CROSSMATCH: NORMAL
EOSINOPHIL # BLD AUTO: 0.29 THOUSAND/ÂΜL (ref 0–0.61)
EOSINOPHIL NFR BLD AUTO: 4 % (ref 0–6)
ERYTHROCYTE [DISTWIDTH] IN BLOOD BY AUTOMATED COUNT: 16.9 % (ref 11.6–15.1)
GFR SERPL CREATININE-BSD FRML MDRD: 51 ML/MIN/1.73SQ M
GLUCOSE SERPL-MCNC: 146 MG/DL (ref 65–140)
GLUCOSE SERPL-MCNC: 150 MG/DL (ref 65–140)
GLUCOSE SERPL-MCNC: 157 MG/DL (ref 65–140)
GLUCOSE SERPL-MCNC: 177 MG/DL (ref 65–140)
GLUCOSE SERPL-MCNC: 212 MG/DL (ref 65–140)
HCT VFR BLD AUTO: 26.9 % (ref 34.8–46.1)
HGB BLD-MCNC: 8.3 G/DL (ref 11.5–15.4)
IMM GRANULOCYTES # BLD AUTO: 0.08 THOUSAND/UL (ref 0–0.2)
IMM GRANULOCYTES NFR BLD AUTO: 1 % (ref 0–2)
LYMPHOCYTES # BLD AUTO: 1.35 THOUSANDS/ÂΜL (ref 0.6–4.47)
LYMPHOCYTES NFR BLD AUTO: 17 % (ref 14–44)
MCH RBC QN AUTO: 27.9 PG (ref 26.8–34.3)
MCHC RBC AUTO-ENTMCNC: 30.9 G/DL (ref 31.4–37.4)
MCV RBC AUTO: 90 FL (ref 82–98)
MONOCYTES # BLD AUTO: 0.84 THOUSAND/ÂΜL (ref 0.17–1.22)
MONOCYTES NFR BLD AUTO: 11 % (ref 4–12)
NEUTROPHILS # BLD AUTO: 5.34 THOUSANDS/ÂΜL (ref 1.85–7.62)
NEUTS SEG NFR BLD AUTO: 66 % (ref 43–75)
NRBC BLD AUTO-RTO: 1 /100 WBCS
P AXIS: 26 DEGREES
PLATELET # BLD AUTO: 171 THOUSANDS/UL (ref 149–390)
PMV BLD AUTO: 10.9 FL (ref 8.9–12.7)
POTASSIUM SERPL-SCNC: 3.5 MMOL/L (ref 3.5–5.3)
PR INTERVAL: 142 MS
QRS AXIS: -11 DEGREES
QRSD INTERVAL: 124 MS
QT INTERVAL: 412 MS
QTC INTERVAL: 532 MS
RBC # BLD AUTO: 2.98 MILLION/UL (ref 3.81–5.12)
SODIUM SERPL-SCNC: 143 MMOL/L (ref 135–147)
T WAVE AXIS: 142 DEGREES
UNIT DISPENSE STATUS: NORMAL
UNIT PRODUCT CODE: NORMAL
UNIT PRODUCT VOLUME: 350 ML
UNIT RH: NORMAL
VENTRICULAR RATE: 100 BPM
WBC # BLD AUTO: 7.94 THOUSAND/UL (ref 4.31–10.16)

## 2025-08-17 PROCEDURE — 82948 REAGENT STRIP/BLOOD GLUCOSE: CPT

## 2025-08-17 PROCEDURE — 99232 SBSQ HOSP IP/OBS MODERATE 35: CPT | Performed by: STUDENT IN AN ORGANIZED HEALTH CARE EDUCATION/TRAINING PROGRAM

## 2025-08-17 PROCEDURE — NC001 PR NO CHARGE: Performed by: SURGERY

## 2025-08-17 PROCEDURE — 99222 1ST HOSP IP/OBS MODERATE 55: CPT | Performed by: SURGERY

## 2025-08-17 PROCEDURE — 85025 COMPLETE CBC W/AUTO DIFF WBC: CPT | Performed by: INTERNAL MEDICINE

## 2025-08-17 PROCEDURE — 93010 ELECTROCARDIOGRAM REPORT: CPT | Performed by: INTERNAL MEDICINE

## 2025-08-17 PROCEDURE — 99232 SBSQ HOSP IP/OBS MODERATE 35: CPT | Performed by: INTERNAL MEDICINE

## 2025-08-17 PROCEDURE — 80048 BASIC METABOLIC PNL TOTAL CA: CPT | Performed by: INTERNAL MEDICINE

## 2025-08-17 RX ORDER — POLYETHYLENE GLYCOL 3350 17 G/17G
17 POWDER, FOR SOLUTION ORAL 2 TIMES DAILY
Status: DISCONTINUED | OUTPATIENT
Start: 2025-08-17 | End: 2025-08-21 | Stop reason: HOSPADM

## 2025-08-17 RX ORDER — SODIUM CHLORIDE/ALOE VERA
1 GEL (GRAM) NASAL
Status: DISCONTINUED | OUTPATIENT
Start: 2025-08-17 | End: 2025-08-21 | Stop reason: HOSPADM

## 2025-08-17 RX ADMIN — LEVOTHYROXINE SODIUM 37.5 MCG: 0.07 TABLET ORAL at 05:32

## 2025-08-17 RX ADMIN — INSULIN LISPRO 1 UNITS: 100 INJECTION, SOLUTION INTRAVENOUS; SUBCUTANEOUS at 21:19

## 2025-08-17 RX ADMIN — INSULIN LISPRO 2 UNITS: 100 INJECTION, SOLUTION INTRAVENOUS; SUBCUTANEOUS at 08:22

## 2025-08-17 RX ADMIN — MORPHINE SULFATE 15 MG: 15 TABLET ORAL at 19:25

## 2025-08-17 RX ADMIN — NYSTATIN: 100000 POWDER TOPICAL at 08:22

## 2025-08-17 RX ADMIN — MORPHINE SULFATE 15 MG: 15 TABLET ORAL at 05:32

## 2025-08-17 RX ADMIN — MORPHINE SULFATE 15 MG: 15 TABLET ORAL at 12:01

## 2025-08-17 RX ADMIN — PANTOPRAZOLE SODIUM 40 MG: 40 INJECTION, POWDER, FOR SOLUTION INTRAVENOUS at 21:19

## 2025-08-17 RX ADMIN — GUAIFENESIN 1200 MG: 600 TABLET ORAL at 21:19

## 2025-08-17 RX ADMIN — POLYETHYLENE GLYCOL 3350 17 G: 17 POWDER, FOR SOLUTION ORAL at 21:18

## 2025-08-17 RX ADMIN — INSULIN LISPRO 1 UNITS: 100 INJECTION, SOLUTION INTRAVENOUS; SUBCUTANEOUS at 12:01

## 2025-08-17 RX ADMIN — NYSTATIN: 100000 POWDER TOPICAL at 17:12

## 2025-08-17 RX ADMIN — PANTOPRAZOLE SODIUM 40 MG: 40 INJECTION, POWDER, FOR SOLUTION INTRAVENOUS at 08:22

## 2025-08-17 RX ADMIN — GUAIFENESIN 1200 MG: 600 TABLET ORAL at 08:22

## 2025-08-17 RX ADMIN — METOPROLOL SUCCINATE 12.5 MG: 25 TABLET, EXTENDED RELEASE ORAL at 08:22

## 2025-08-17 RX ADMIN — ATORVASTATIN CALCIUM 40 MG: 40 TABLET, FILM COATED ORAL at 08:22

## 2025-08-17 RX ADMIN — Medication 1 APPLICATION: at 19:25

## 2025-08-17 RX ADMIN — METHOCARBAMOL 500 MG: 500 TABLET ORAL at 08:38

## 2025-08-17 RX ADMIN — ZOLPIDEM TARTRATE 5 MG: 5 TABLET ORAL at 21:39

## 2025-08-17 RX ADMIN — INSULIN GLARGINE 10 UNITS: 100 INJECTION, SOLUTION SUBCUTANEOUS at 21:19

## 2025-08-17 RX ADMIN — POLYETHYLENE GLYCOL 3350 17 G: 17 POWDER, FOR SOLUTION ORAL at 08:22

## 2025-08-17 RX ADMIN — ACETAMINOPHEN 975 MG: 325 TABLET ORAL at 17:16

## 2025-08-17 RX ADMIN — METHOCARBAMOL 500 MG: 500 TABLET ORAL at 19:25

## 2025-08-18 ENCOUNTER — APPOINTMENT (INPATIENT)
Dept: NON INVASIVE DIAGNOSTICS | Facility: HOSPITAL | Age: 80
DRG: 811 | End: 2025-08-18
Payer: MEDICARE

## 2025-08-18 LAB
ABO GROUP BLD: NORMAL
ALBUMIN SERPL BCG-MCNC: 2.9 G/DL (ref 3.5–5)
ALP SERPL-CCNC: 98 U/L (ref 34–104)
ALT SERPL W P-5'-P-CCNC: 10 U/L (ref 7–52)
ANION GAP SERPL CALCULATED.3IONS-SCNC: 3 MMOL/L (ref 4–13)
AST SERPL W P-5'-P-CCNC: 17 U/L (ref 13–39)
BASOPHILS # BLD AUTO: 0.04 THOUSANDS/ÂΜL (ref 0–0.1)
BASOPHILS NFR BLD AUTO: 1 % (ref 0–1)
BILIRUB SERPL-MCNC: 0.39 MG/DL (ref 0.2–1)
BLD GP AB SCN SERPL QL: NEGATIVE
BUN SERPL-MCNC: 17 MG/DL (ref 5–25)
CALCIUM ALBUM COR SERPL-MCNC: 8.9 MG/DL (ref 8.3–10.1)
CALCIUM SERPL-MCNC: 8 MG/DL (ref 8.4–10.2)
CHLORIDE SERPL-SCNC: 112 MMOL/L (ref 96–108)
CO2 SERPL-SCNC: 28 MMOL/L (ref 21–32)
CREAT SERPL-MCNC: 0.93 MG/DL (ref 0.6–1.3)
EOSINOPHIL # BLD AUTO: 0.29 THOUSAND/ÂΜL (ref 0–0.61)
EOSINOPHIL NFR BLD AUTO: 4 % (ref 0–6)
ERYTHROCYTE [DISTWIDTH] IN BLOOD BY AUTOMATED COUNT: 18.6 % (ref 11.6–15.1)
GFR SERPL CREATININE-BSD FRML MDRD: 57 ML/MIN/1.73SQ M
GLUCOSE SERPL-MCNC: 121 MG/DL (ref 65–140)
GLUCOSE SERPL-MCNC: 127 MG/DL (ref 65–140)
GLUCOSE SERPL-MCNC: 130 MG/DL (ref 65–140)
GLUCOSE SERPL-MCNC: 158 MG/DL (ref 65–140)
GLUCOSE SERPL-MCNC: 228 MG/DL (ref 65–140)
HCT VFR BLD AUTO: 28.2 % (ref 34.8–46.1)
HGB BLD-MCNC: 8.7 G/DL (ref 11.5–15.4)
IMM GRANULOCYTES # BLD AUTO: 0.04 THOUSAND/UL (ref 0–0.2)
IMM GRANULOCYTES NFR BLD AUTO: 1 % (ref 0–2)
INR PPP: 1.23 (ref 0.85–1.19)
LYMPHOCYTES # BLD AUTO: 1.1 THOUSANDS/ÂΜL (ref 0.6–4.47)
LYMPHOCYTES NFR BLD AUTO: 17 % (ref 14–44)
MCH RBC QN AUTO: 28.2 PG (ref 26.8–34.3)
MCHC RBC AUTO-ENTMCNC: 30.9 G/DL (ref 31.4–37.4)
MCV RBC AUTO: 91 FL (ref 82–98)
MONOCYTES # BLD AUTO: 0.6 THOUSAND/ÂΜL (ref 0.17–1.22)
MONOCYTES NFR BLD AUTO: 9 % (ref 4–12)
NEUTROPHILS # BLD AUTO: 4.56 THOUSANDS/ÂΜL (ref 1.85–7.62)
NEUTS SEG NFR BLD AUTO: 68 % (ref 43–75)
NRBC BLD AUTO-RTO: 0 /100 WBCS
PLATELET # BLD AUTO: 196 THOUSANDS/UL (ref 149–390)
PMV BLD AUTO: 10.4 FL (ref 8.9–12.7)
POTASSIUM SERPL-SCNC: 3.6 MMOL/L (ref 3.5–5.3)
PROT SERPL-MCNC: 4.9 G/DL (ref 6.4–8.4)
PROTHROMBIN TIME: 15.8 SECONDS (ref 12.3–15)
RBC # BLD AUTO: 3.09 MILLION/UL (ref 3.81–5.12)
RH BLD: POSITIVE
SODIUM SERPL-SCNC: 143 MMOL/L (ref 135–147)
SPECIMEN EXPIRATION DATE: NORMAL
WBC # BLD AUTO: 6.63 THOUSAND/UL (ref 4.31–10.16)

## 2025-08-18 PROCEDURE — 85025 COMPLETE CBC W/AUTO DIFF WBC: CPT | Performed by: PHYSICIAN ASSISTANT

## 2025-08-18 PROCEDURE — 99232 SBSQ HOSP IP/OBS MODERATE 35: CPT

## 2025-08-18 PROCEDURE — 85610 PROTHROMBIN TIME: CPT | Performed by: PHYSICIAN ASSISTANT

## 2025-08-18 PROCEDURE — 86901 BLOOD TYPING SEROLOGIC RH(D): CPT | Performed by: INTERNAL MEDICINE

## 2025-08-18 PROCEDURE — 93931 UPPER EXTREMITY STUDY: CPT

## 2025-08-18 PROCEDURE — 99232 SBSQ HOSP IP/OBS MODERATE 35: CPT | Performed by: PHYSICIAN ASSISTANT

## 2025-08-18 PROCEDURE — 86850 RBC ANTIBODY SCREEN: CPT | Performed by: INTERNAL MEDICINE

## 2025-08-18 PROCEDURE — 82948 REAGENT STRIP/BLOOD GLUCOSE: CPT

## 2025-08-18 PROCEDURE — 86900 BLOOD TYPING SEROLOGIC ABO: CPT | Performed by: INTERNAL MEDICINE

## 2025-08-18 PROCEDURE — 80053 COMPREHEN METABOLIC PANEL: CPT | Performed by: PHYSICIAN ASSISTANT

## 2025-08-18 RX ORDER — BISACODYL 5 MG/1
10 TABLET, DELAYED RELEASE ORAL EVERY 4 HOURS
Status: COMPLETED | OUTPATIENT
Start: 2025-08-18 | End: 2025-08-18

## 2025-08-18 RX ORDER — FLUTICASONE PROPIONATE 50 MCG
1 SPRAY, SUSPENSION (ML) NASAL DAILY
Status: DISCONTINUED | OUTPATIENT
Start: 2025-08-18 | End: 2025-08-21 | Stop reason: HOSPADM

## 2025-08-18 RX ORDER — ECHINACEA PURPUREA EXTRACT 125 MG
1 TABLET ORAL
Status: DISCONTINUED | OUTPATIENT
Start: 2025-08-18 | End: 2025-08-21 | Stop reason: HOSPADM

## 2025-08-18 RX ADMIN — MORPHINE SULFATE 15 MG: 15 TABLET ORAL at 09:12

## 2025-08-18 RX ADMIN — METOPROLOL SUCCINATE 12.5 MG: 25 TABLET, EXTENDED RELEASE ORAL at 08:59

## 2025-08-18 RX ADMIN — INSULIN LISPRO 1 UNITS: 100 INJECTION, SOLUTION INTRAVENOUS; SUBCUTANEOUS at 08:58

## 2025-08-18 RX ADMIN — GUAIFENESIN 1200 MG: 600 TABLET ORAL at 22:09

## 2025-08-18 RX ADMIN — METHOCARBAMOL 500 MG: 500 TABLET ORAL at 09:12

## 2025-08-18 RX ADMIN — FLUTICASONE PROPIONATE 1 SPRAY: 50 SPRAY, METERED NASAL at 16:34

## 2025-08-18 RX ADMIN — BISACODYL 10 MG: 5 TABLET, COATED ORAL at 20:15

## 2025-08-18 RX ADMIN — INSULIN LISPRO 2 UNITS: 100 INJECTION, SOLUTION INTRAVENOUS; SUBCUTANEOUS at 16:38

## 2025-08-18 RX ADMIN — GUAIFENESIN 1200 MG: 600 TABLET ORAL at 08:59

## 2025-08-18 RX ADMIN — METHOCARBAMOL 500 MG: 500 TABLET ORAL at 16:52

## 2025-08-18 RX ADMIN — POLYETHYLENE GLYCOL 3350, SODIUM SULFATE ANHYDROUS, SODIUM BICARBONATE, SODIUM CHLORIDE, POTASSIUM CHLORIDE 4000 ML: 236; 22.74; 6.74; 5.86; 2.97 POWDER, FOR SOLUTION ORAL at 16:35

## 2025-08-18 RX ADMIN — PANTOPRAZOLE SODIUM 40 MG: 40 INJECTION, POWDER, FOR SOLUTION INTRAVENOUS at 22:09

## 2025-08-18 RX ADMIN — POLYETHYLENE GLYCOL 3350 17 G: 17 POWDER, FOR SOLUTION ORAL at 08:58

## 2025-08-18 RX ADMIN — INSULIN GLARGINE 10 UNITS: 100 INJECTION, SOLUTION SUBCUTANEOUS at 22:08

## 2025-08-18 RX ADMIN — NYSTATIN 1 APPLICATION: 100000 POWDER TOPICAL at 09:06

## 2025-08-18 RX ADMIN — LEVOTHYROXINE SODIUM 37.5 MCG: 0.07 TABLET ORAL at 06:21

## 2025-08-18 RX ADMIN — NYSTATIN: 100000 POWDER TOPICAL at 16:38

## 2025-08-18 RX ADMIN — MORPHINE SULFATE 15 MG: 15 TABLET ORAL at 20:15

## 2025-08-18 RX ADMIN — BISACODYL 10 MG: 5 TABLET, COATED ORAL at 16:37

## 2025-08-18 RX ADMIN — PANTOPRAZOLE SODIUM 40 MG: 40 INJECTION, POWDER, FOR SOLUTION INTRAVENOUS at 08:59

## 2025-08-18 RX ADMIN — ATORVASTATIN CALCIUM 40 MG: 40 TABLET, FILM COATED ORAL at 08:59

## 2025-08-19 ENCOUNTER — ANESTHESIA EVENT (INPATIENT)
Dept: GASTROENTEROLOGY | Facility: HOSPITAL | Age: 80
DRG: 811 | End: 2025-08-19
Payer: MEDICARE

## 2025-08-19 ENCOUNTER — APPOINTMENT (INPATIENT)
Dept: GASTROENTEROLOGY | Facility: HOSPITAL | Age: 80
DRG: 811 | End: 2025-08-19
Payer: MEDICARE

## 2025-08-19 ENCOUNTER — ANESTHESIA (INPATIENT)
Dept: GASTROENTEROLOGY | Facility: HOSPITAL | Age: 80
DRG: 811 | End: 2025-08-19
Payer: MEDICARE

## 2025-08-19 LAB
ANION GAP SERPL CALCULATED.3IONS-SCNC: 4 MMOL/L (ref 4–13)
BUN SERPL-MCNC: 12 MG/DL (ref 5–25)
CALCIUM SERPL-MCNC: 8 MG/DL (ref 8.4–10.2)
CHLORIDE SERPL-SCNC: 111 MMOL/L (ref 96–108)
CO2 SERPL-SCNC: 28 MMOL/L (ref 21–32)
CREAT SERPL-MCNC: 0.9 MG/DL (ref 0.6–1.3)
ERYTHROCYTE [DISTWIDTH] IN BLOOD BY AUTOMATED COUNT: 18.6 % (ref 11.6–15.1)
GFR SERPL CREATININE-BSD FRML MDRD: 59 ML/MIN/1.73SQ M
GLUCOSE SERPL-MCNC: 109 MG/DL (ref 65–140)
GLUCOSE SERPL-MCNC: 109 MG/DL (ref 65–140)
GLUCOSE SERPL-MCNC: 116 MG/DL (ref 65–140)
GLUCOSE SERPL-MCNC: 156 MG/DL (ref 65–140)
GLUCOSE SERPL-MCNC: 92 MG/DL (ref 65–140)
HCT VFR BLD AUTO: 26.6 % (ref 34.8–46.1)
HGB BLD-MCNC: 8.4 G/DL (ref 11.5–15.4)
MCH RBC QN AUTO: 29 PG (ref 26.8–34.3)
MCHC RBC AUTO-ENTMCNC: 31.6 G/DL (ref 31.4–37.4)
MCV RBC AUTO: 92 FL (ref 82–98)
PLATELET # BLD AUTO: 196 THOUSANDS/UL (ref 149–390)
PMV BLD AUTO: 10.6 FL (ref 8.9–12.7)
POTASSIUM SERPL-SCNC: 3.2 MMOL/L (ref 3.5–5.3)
RBC # BLD AUTO: 2.9 MILLION/UL (ref 3.81–5.12)
SODIUM SERPL-SCNC: 143 MMOL/L (ref 135–147)
WBC # BLD AUTO: 7.23 THOUSAND/UL (ref 4.31–10.16)

## 2025-08-19 PROCEDURE — 0DB98ZX EXCISION OF DUODENUM, VIA NATURAL OR ARTIFICIAL OPENING ENDOSCOPIC, DIAGNOSTIC: ICD-10-PCS | Performed by: INTERNAL MEDICINE

## 2025-08-19 PROCEDURE — 99232 SBSQ HOSP IP/OBS MODERATE 35: CPT | Performed by: SURGERY

## 2025-08-19 PROCEDURE — 80048 BASIC METABOLIC PNL TOTAL CA: CPT | Performed by: PHYSICIAN ASSISTANT

## 2025-08-19 PROCEDURE — 97110 THERAPEUTIC EXERCISES: CPT

## 2025-08-19 PROCEDURE — 97530 THERAPEUTIC ACTIVITIES: CPT

## 2025-08-19 PROCEDURE — 0DBA8ZX EXCISION OF JEJUNUM, VIA NATURAL OR ARTIFICIAL OPENING ENDOSCOPIC, DIAGNOSTIC: ICD-10-PCS | Performed by: INTERNAL MEDICINE

## 2025-08-19 PROCEDURE — 82948 REAGENT STRIP/BLOOD GLUCOSE: CPT

## 2025-08-19 PROCEDURE — 99232 SBSQ HOSP IP/OBS MODERATE 35: CPT | Performed by: PHYSICIAN ASSISTANT

## 2025-08-19 PROCEDURE — 0DBN8ZX EXCISION OF SIGMOID COLON, VIA NATURAL OR ARTIFICIAL OPENING ENDOSCOPIC, DIAGNOSTIC: ICD-10-PCS | Performed by: INTERNAL MEDICINE

## 2025-08-19 PROCEDURE — 88305 TISSUE EXAM BY PATHOLOGIST: CPT | Performed by: STUDENT IN AN ORGANIZED HEALTH CARE EDUCATION/TRAINING PROGRAM

## 2025-08-19 PROCEDURE — 45380 COLONOSCOPY AND BIOPSY: CPT | Performed by: INTERNAL MEDICINE

## 2025-08-19 PROCEDURE — 44361 SMALL BOWEL ENDOSCOPY/BIOPSY: CPT | Performed by: INTERNAL MEDICINE

## 2025-08-19 PROCEDURE — 97535 SELF CARE MNGMENT TRAINING: CPT

## 2025-08-19 PROCEDURE — 97116 GAIT TRAINING THERAPY: CPT

## 2025-08-19 PROCEDURE — 85027 COMPLETE CBC AUTOMATED: CPT | Performed by: PHYSICIAN ASSISTANT

## 2025-08-19 PROCEDURE — 0DB68ZX EXCISION OF STOMACH, VIA NATURAL OR ARTIFICIAL OPENING ENDOSCOPIC, DIAGNOSTIC: ICD-10-PCS | Performed by: INTERNAL MEDICINE

## 2025-08-19 PROCEDURE — 93931 UPPER EXTREMITY STUDY: CPT | Performed by: SURGERY

## 2025-08-19 RX ORDER — POTASSIUM CHLORIDE 14.9 MG/ML
20 INJECTION INTRAVENOUS ONCE
Status: COMPLETED | OUTPATIENT
Start: 2025-08-19 | End: 2025-08-19

## 2025-08-19 RX ORDER — EPHEDRINE SULFATE 50 MG/ML
INJECTION INTRAVENOUS AS NEEDED
Status: DISCONTINUED | OUTPATIENT
Start: 2025-08-19 | End: 2025-08-19

## 2025-08-19 RX ORDER — PROPOFOL 10 MG/ML
INJECTION, EMULSION INTRAVENOUS AS NEEDED
Status: DISCONTINUED | OUTPATIENT
Start: 2025-08-19 | End: 2025-08-19

## 2025-08-19 RX ORDER — POTASSIUM CHLORIDE 1500 MG/1
20 TABLET, EXTENDED RELEASE ORAL ONCE
Status: DISCONTINUED | OUTPATIENT
Start: 2025-08-19 | End: 2025-08-21

## 2025-08-19 RX ORDER — SODIUM CHLORIDE, SODIUM LACTATE, POTASSIUM CHLORIDE, CALCIUM CHLORIDE 600; 310; 30; 20 MG/100ML; MG/100ML; MG/100ML; MG/100ML
INJECTION, SOLUTION INTRAVENOUS CONTINUOUS PRN
Status: DISCONTINUED | OUTPATIENT
Start: 2025-08-19 | End: 2025-08-19

## 2025-08-19 RX ORDER — PHENYLEPHRINE HCL IN 0.9% NACL 1 MG/10 ML
SYRINGE (ML) INTRAVENOUS AS NEEDED
Status: DISCONTINUED | OUTPATIENT
Start: 2025-08-19 | End: 2025-08-19

## 2025-08-19 RX ORDER — LABETALOL HYDROCHLORIDE 5 MG/ML
INJECTION, SOLUTION INTRAVENOUS AS NEEDED
Status: DISCONTINUED | OUTPATIENT
Start: 2025-08-19 | End: 2025-08-19

## 2025-08-19 RX ORDER — LIDOCAINE HYDROCHLORIDE 20 MG/ML
INJECTION, SOLUTION EPIDURAL; INFILTRATION; INTRACAUDAL; PERINEURAL AS NEEDED
Status: DISCONTINUED | OUTPATIENT
Start: 2025-08-19 | End: 2025-08-19

## 2025-08-19 RX ORDER — POLYETHYLENE GLYCOL 3350 17 G/17G
119 POWDER, FOR SOLUTION ORAL ONCE
Status: COMPLETED | OUTPATIENT
Start: 2025-08-19 | End: 2025-08-19

## 2025-08-19 RX ADMIN — INSULIN LISPRO 1 UNITS: 100 INJECTION, SOLUTION INTRAVENOUS; SUBCUTANEOUS at 21:51

## 2025-08-19 RX ADMIN — SODIUM CHLORIDE, SODIUM LACTATE, POTASSIUM CHLORIDE, AND CALCIUM CHLORIDE: .6; .31; .03; .02 INJECTION, SOLUTION INTRAVENOUS at 17:26

## 2025-08-19 RX ADMIN — EPHEDRINE SULFATE 10 MG: 50 INJECTION INTRAVENOUS at 17:06

## 2025-08-19 RX ADMIN — METOPROLOL SUCCINATE 12.5 MG: 25 TABLET, EXTENDED RELEASE ORAL at 09:42

## 2025-08-19 RX ADMIN — Medication 200 MCG: at 16:58

## 2025-08-19 RX ADMIN — GUAIFENESIN 1200 MG: 600 TABLET ORAL at 09:42

## 2025-08-19 RX ADMIN — NYSTATIN: 100000 POWDER TOPICAL at 18:33

## 2025-08-19 RX ADMIN — ACETAMINOPHEN 975 MG: 325 TABLET ORAL at 15:21

## 2025-08-19 RX ADMIN — Medication 200 MCG: at 16:56

## 2025-08-19 RX ADMIN — ATORVASTATIN CALCIUM 40 MG: 40 TABLET, FILM COATED ORAL at 09:42

## 2025-08-19 RX ADMIN — Medication 200 MCG: at 17:08

## 2025-08-19 RX ADMIN — POTASSIUM CHLORIDE 20 MEQ: 14.9 INJECTION, SOLUTION INTRAVENOUS at 15:22

## 2025-08-19 RX ADMIN — NYSTATIN: 100000 POWDER TOPICAL at 09:42

## 2025-08-19 RX ADMIN — EPHEDRINE SULFATE 10 MG: 50 INJECTION INTRAVENOUS at 17:08

## 2025-08-19 RX ADMIN — FLUTICASONE PROPIONATE 1 SPRAY: 50 SPRAY, METERED NASAL at 09:42

## 2025-08-19 RX ADMIN — MORPHINE SULFATE 15 MG: 15 TABLET ORAL at 13:57

## 2025-08-19 RX ADMIN — Medication 100 MCG: at 17:18

## 2025-08-19 RX ADMIN — SODIUM CHLORIDE, SODIUM LACTATE, POTASSIUM CHLORIDE, AND CALCIUM CHLORIDE: .6; .31; .03; .02 INJECTION, SOLUTION INTRAVENOUS at 16:15

## 2025-08-19 RX ADMIN — MORPHINE SULFATE 15 MG: 15 TABLET ORAL at 05:44

## 2025-08-19 RX ADMIN — PROPOFOL 90 MCG/KG/MIN: 10 INJECTION, EMULSION INTRAVENOUS at 16:19

## 2025-08-19 RX ADMIN — Medication 200 MCG: at 17:24

## 2025-08-19 RX ADMIN — POLYETHYLENE GLYCOL 3350 17 G: 17 POWDER, FOR SOLUTION ORAL at 21:50

## 2025-08-19 RX ADMIN — EPHEDRINE SULFATE 10 MG: 50 INJECTION INTRAVENOUS at 16:58

## 2025-08-19 RX ADMIN — POLYETHYLENE GLYCOL 3350 119 G: 17 POWDER, FOR SOLUTION ORAL at 10:50

## 2025-08-19 RX ADMIN — ZOLPIDEM TARTRATE 5 MG: 5 TABLET ORAL at 22:59

## 2025-08-19 RX ADMIN — POLYETHYLENE GLYCOL 3350, SODIUM SULFATE ANHYDROUS, SODIUM BICARBONATE, SODIUM CHLORIDE, POTASSIUM CHLORIDE 4000 ML: 236; 22.74; 6.74; 5.86; 2.97 POWDER, FOR SOLUTION ORAL at 05:45

## 2025-08-19 RX ADMIN — LEVOTHYROXINE SODIUM 37.5 MCG: 0.07 TABLET ORAL at 05:44

## 2025-08-19 RX ADMIN — MORPHINE SULFATE 15 MG: 15 TABLET ORAL at 21:51

## 2025-08-19 RX ADMIN — PANTOPRAZOLE SODIUM 40 MG: 40 INJECTION, POWDER, FOR SOLUTION INTRAVENOUS at 21:50

## 2025-08-19 RX ADMIN — PROPOFOL 100 MG: 10 INJECTION, EMULSION INTRAVENOUS at 16:18

## 2025-08-19 RX ADMIN — INSULIN GLARGINE 10 UNITS: 100 INJECTION, SOLUTION SUBCUTANEOUS at 21:50

## 2025-08-19 RX ADMIN — METHOCARBAMOL 500 MG: 500 TABLET ORAL at 09:45

## 2025-08-19 RX ADMIN — PANTOPRAZOLE SODIUM 40 MG: 40 INJECTION, POWDER, FOR SOLUTION INTRAVENOUS at 09:42

## 2025-08-19 RX ADMIN — Medication 100 MCG: at 17:06

## 2025-08-19 RX ADMIN — LIDOCAINE HYDROCHLORIDE 80 MG: 20 INJECTION, SOLUTION EPIDURAL; INFILTRATION; INTRACAUDAL at 16:18

## 2025-08-19 RX ADMIN — EPHEDRINE SULFATE 10 MG: 50 INJECTION INTRAVENOUS at 17:18

## 2025-08-19 RX ADMIN — GUAIFENESIN 1200 MG: 600 TABLET ORAL at 21:50

## 2025-08-19 RX ADMIN — LABETALOL HYDROCHLORIDE 10 MG: 5 INJECTION, SOLUTION INTRAVENOUS at 16:36

## 2025-08-19 RX ADMIN — Medication 40 MG: at 16:30

## 2025-08-20 ENCOUNTER — TELEPHONE (OUTPATIENT)
Age: 80
End: 2025-08-20

## 2025-08-20 ENCOUNTER — TELEPHONE (OUTPATIENT)
Dept: GASTROENTEROLOGY | Facility: CLINIC | Age: 80
End: 2025-08-20

## 2025-08-20 LAB
ANION GAP SERPL CALCULATED.3IONS-SCNC: 4 MMOL/L (ref 4–13)
BUN SERPL-MCNC: 10 MG/DL (ref 5–25)
CALCIUM SERPL-MCNC: 7.7 MG/DL (ref 8.4–10.2)
CHLORIDE SERPL-SCNC: 110 MMOL/L (ref 96–108)
CO2 SERPL-SCNC: 25 MMOL/L (ref 21–32)
CREAT SERPL-MCNC: 0.84 MG/DL (ref 0.6–1.3)
ERYTHROCYTE [DISTWIDTH] IN BLOOD BY AUTOMATED COUNT: 19.4 % (ref 11.6–15.1)
GFR SERPL CREATININE-BSD FRML MDRD: 64 ML/MIN/1.73SQ M
GLUCOSE SERPL-MCNC: 135 MG/DL (ref 65–140)
GLUCOSE SERPL-MCNC: 137 MG/DL (ref 65–140)
GLUCOSE SERPL-MCNC: 151 MG/DL (ref 65–140)
GLUCOSE SERPL-MCNC: 202 MG/DL (ref 65–140)
GLUCOSE SERPL-MCNC: 215 MG/DL (ref 65–140)
HCT VFR BLD AUTO: 24.8 % (ref 34.8–46.1)
HGB BLD-MCNC: 8 G/DL (ref 11.5–15.4)
MCH RBC QN AUTO: 29 PG (ref 26.8–34.3)
MCHC RBC AUTO-ENTMCNC: 32.3 G/DL (ref 31.4–37.4)
MCV RBC AUTO: 90 FL (ref 82–98)
PLATELET # BLD AUTO: 202 THOUSANDS/UL (ref 149–390)
PMV BLD AUTO: 10.3 FL (ref 8.9–12.7)
POTASSIUM SERPL-SCNC: 3.5 MMOL/L (ref 3.5–5.3)
RBC # BLD AUTO: 2.76 MILLION/UL (ref 3.81–5.12)
SODIUM SERPL-SCNC: 139 MMOL/L (ref 135–147)
WBC # BLD AUTO: 7.48 THOUSAND/UL (ref 4.31–10.16)

## 2025-08-20 PROCEDURE — 97535 SELF CARE MNGMENT TRAINING: CPT

## 2025-08-20 PROCEDURE — 80048 BASIC METABOLIC PNL TOTAL CA: CPT | Performed by: PHYSICIAN ASSISTANT

## 2025-08-20 PROCEDURE — 82948 REAGENT STRIP/BLOOD GLUCOSE: CPT

## 2025-08-20 PROCEDURE — 97530 THERAPEUTIC ACTIVITIES: CPT

## 2025-08-20 PROCEDURE — 99232 SBSQ HOSP IP/OBS MODERATE 35: CPT | Performed by: NURSE PRACTITIONER

## 2025-08-20 PROCEDURE — 85027 COMPLETE CBC AUTOMATED: CPT | Performed by: PHYSICIAN ASSISTANT

## 2025-08-20 RX ORDER — PANTOPRAZOLE SODIUM 40 MG/1
40 TABLET, DELAYED RELEASE ORAL
Status: DISCONTINUED | OUTPATIENT
Start: 2025-08-20 | End: 2025-08-21 | Stop reason: HOSPADM

## 2025-08-20 RX ORDER — TORSEMIDE 20 MG/1
40 TABLET ORAL
Status: DISCONTINUED | OUTPATIENT
Start: 2025-08-20 | End: 2025-08-21 | Stop reason: HOSPADM

## 2025-08-20 RX ADMIN — PANTOPRAZOLE SODIUM 40 MG: 40 TABLET, DELAYED RELEASE ORAL at 16:19

## 2025-08-20 RX ADMIN — METHOCARBAMOL 500 MG: 500 TABLET ORAL at 06:28

## 2025-08-20 RX ADMIN — TORSEMIDE 40 MG: 20 TABLET ORAL at 16:19

## 2025-08-20 RX ADMIN — NYSTATIN: 100000 POWDER TOPICAL at 18:02

## 2025-08-20 RX ADMIN — FLUTICASONE PROPIONATE 1 SPRAY: 50 SPRAY, METERED NASAL at 09:48

## 2025-08-20 RX ADMIN — ACETAMINOPHEN 975 MG: 325 TABLET ORAL at 03:20

## 2025-08-20 RX ADMIN — GUAIFENESIN 1200 MG: 600 TABLET ORAL at 21:50

## 2025-08-20 RX ADMIN — METOPROLOL SUCCINATE 12.5 MG: 25 TABLET, EXTENDED RELEASE ORAL at 09:44

## 2025-08-20 RX ADMIN — GUAIFENESIN 1200 MG: 600 TABLET ORAL at 09:44

## 2025-08-20 RX ADMIN — ATORVASTATIN CALCIUM 40 MG: 40 TABLET, FILM COATED ORAL at 09:44

## 2025-08-20 RX ADMIN — PANTOPRAZOLE SODIUM 40 MG: 40 INJECTION, POWDER, FOR SOLUTION INTRAVENOUS at 09:46

## 2025-08-20 RX ADMIN — INSULIN LISPRO 2 UNITS: 100 INJECTION, SOLUTION INTRAVENOUS; SUBCUTANEOUS at 21:50

## 2025-08-20 RX ADMIN — INSULIN LISPRO 1 UNITS: 100 INJECTION, SOLUTION INTRAVENOUS; SUBCUTANEOUS at 12:23

## 2025-08-20 RX ADMIN — NYSTATIN: 100000 POWDER TOPICAL at 09:47

## 2025-08-20 RX ADMIN — POLYETHYLENE GLYCOL 3350 17 G: 17 POWDER, FOR SOLUTION ORAL at 21:53

## 2025-08-20 RX ADMIN — INSULIN LISPRO 1 UNITS: 100 INJECTION, SOLUTION INTRAVENOUS; SUBCUTANEOUS at 16:19

## 2025-08-20 RX ADMIN — MORPHINE SULFATE 15 MG: 15 TABLET ORAL at 09:44

## 2025-08-20 RX ADMIN — INSULIN GLARGINE 10 UNITS: 100 INJECTION, SOLUTION SUBCUTANEOUS at 21:50

## 2025-08-20 RX ADMIN — MORPHINE SULFATE 15 MG: 15 TABLET ORAL at 19:50

## 2025-08-20 RX ADMIN — ZOLPIDEM TARTRATE 5 MG: 5 TABLET ORAL at 21:53

## 2025-08-20 RX ADMIN — METHOCARBAMOL 500 MG: 500 TABLET ORAL at 16:19

## 2025-08-20 RX ADMIN — LEVOTHYROXINE SODIUM 37.5 MCG: 0.07 TABLET ORAL at 06:27

## 2025-08-21 VITALS
WEIGHT: 158.4 LBS | OXYGEN SATURATION: 98 % | TEMPERATURE: 98.6 F | HEART RATE: 83 BPM | BODY MASS INDEX: 31.1 KG/M2 | DIASTOLIC BLOOD PRESSURE: 60 MMHG | SYSTOLIC BLOOD PRESSURE: 128 MMHG | HEIGHT: 60 IN | RESPIRATION RATE: 20 BRPM

## 2025-08-21 PROBLEM — K25.0 ACUTE GASTRIC ULCER WITH HEMORRHAGE: Status: ACTIVE | Noted: 2025-08-21

## 2025-08-21 PROBLEM — K62.6 RECTAL ULCER: Status: ACTIVE | Noted: 2025-08-21

## 2025-08-21 LAB
ANION GAP SERPL CALCULATED.3IONS-SCNC: 4 MMOL/L (ref 4–13)
BUN SERPL-MCNC: 7 MG/DL (ref 5–25)
CALCIUM SERPL-MCNC: 7.8 MG/DL (ref 8.4–10.2)
CHLORIDE SERPL-SCNC: 108 MMOL/L (ref 96–108)
CO2 SERPL-SCNC: 28 MMOL/L (ref 21–32)
CREAT SERPL-MCNC: 0.92 MG/DL (ref 0.6–1.3)
ERYTHROCYTE [DISTWIDTH] IN BLOOD BY AUTOMATED COUNT: 19 % (ref 11.6–15.1)
GFR SERPL CREATININE-BSD FRML MDRD: 58 ML/MIN/1.73SQ M
GLUCOSE SERPL-MCNC: 101 MG/DL (ref 65–140)
GLUCOSE SERPL-MCNC: 116 MG/DL (ref 65–140)
GLUCOSE SERPL-MCNC: 151 MG/DL (ref 65–140)
HCT VFR BLD AUTO: 26.3 % (ref 34.8–46.1)
HGB BLD-MCNC: 8.2 G/DL (ref 11.5–15.4)
MCH RBC QN AUTO: 28.7 PG (ref 26.8–34.3)
MCHC RBC AUTO-ENTMCNC: 31.2 G/DL (ref 31.4–37.4)
MCV RBC AUTO: 92 FL (ref 82–98)
PLATELET # BLD AUTO: 217 THOUSANDS/UL (ref 149–390)
PMV BLD AUTO: 10 FL (ref 8.9–12.7)
POTASSIUM SERPL-SCNC: 3.3 MMOL/L (ref 3.5–5.3)
RBC # BLD AUTO: 2.86 MILLION/UL (ref 3.81–5.12)
SODIUM SERPL-SCNC: 140 MMOL/L (ref 135–147)
WBC # BLD AUTO: 6.47 THOUSAND/UL (ref 4.31–10.16)

## 2025-08-21 PROCEDURE — 85027 COMPLETE CBC AUTOMATED: CPT | Performed by: PHYSICIAN ASSISTANT

## 2025-08-21 PROCEDURE — 99239 HOSP IP/OBS DSCHRG MGMT >30: CPT | Performed by: NURSE PRACTITIONER

## 2025-08-21 PROCEDURE — 80048 BASIC METABOLIC PNL TOTAL CA: CPT | Performed by: PHYSICIAN ASSISTANT

## 2025-08-21 PROCEDURE — 82948 REAGENT STRIP/BLOOD GLUCOSE: CPT

## 2025-08-21 RX ORDER — POLYETHYLENE GLYCOL 3350 17 G/17G
17 POWDER, FOR SOLUTION ORAL 2 TIMES DAILY
Start: 2025-08-21

## 2025-08-21 RX ORDER — POTASSIUM CHLORIDE 1500 MG/1
40 TABLET, EXTENDED RELEASE ORAL 2 TIMES DAILY
Status: DISCONTINUED | OUTPATIENT
Start: 2025-08-21 | End: 2025-08-21 | Stop reason: HOSPADM

## 2025-08-21 RX ORDER — OMEPRAZOLE 40 MG/1
40 CAPSULE, DELAYED RELEASE ORAL
Qty: 60 CAPSULE | Refills: 0
Start: 2025-08-21 | End: 2026-02-17

## 2025-08-21 RX ADMIN — METHOCARBAMOL 500 MG: 500 TABLET ORAL at 12:32

## 2025-08-21 RX ADMIN — INSULIN LISPRO 1 UNITS: 100 INJECTION, SOLUTION INTRAVENOUS; SUBCUTANEOUS at 12:31

## 2025-08-21 RX ADMIN — LEVOTHYROXINE SODIUM 37.5 MCG: 0.07 TABLET ORAL at 05:06

## 2025-08-21 RX ADMIN — TORSEMIDE 40 MG: 20 TABLET ORAL at 08:43

## 2025-08-21 RX ADMIN — ALUMINUM HYDROXIDE, MAGNESIUM HYDROXIDE, AND DIMETHICONE 30 ML: 200; 20; 200 SUSPENSION ORAL at 08:50

## 2025-08-21 RX ADMIN — PANTOPRAZOLE SODIUM 40 MG: 40 TABLET, DELAYED RELEASE ORAL at 08:43

## 2025-08-21 RX ADMIN — GUAIFENESIN 1200 MG: 600 TABLET ORAL at 08:43

## 2025-08-21 RX ADMIN — ATORVASTATIN CALCIUM 40 MG: 40 TABLET, FILM COATED ORAL at 08:43

## 2025-08-21 RX ADMIN — FLUTICASONE PROPIONATE 1 SPRAY: 50 SPRAY, METERED NASAL at 08:43

## 2025-08-21 RX ADMIN — NYSTATIN: 100000 POWDER TOPICAL at 08:44

## 2025-08-21 RX ADMIN — METOPROLOL SUCCINATE 12.5 MG: 25 TABLET, EXTENDED RELEASE ORAL at 08:43

## 2025-08-21 RX ADMIN — POLYETHYLENE GLYCOL 3350 17 G: 17 POWDER, FOR SOLUTION ORAL at 08:43

## 2025-08-21 RX ADMIN — POTASSIUM CHLORIDE 40 MEQ: 1500 TABLET, EXTENDED RELEASE ORAL at 08:43

## 2025-08-25 PROCEDURE — 88305 TISSUE EXAM BY PATHOLOGIST: CPT | Performed by: STUDENT IN AN ORGANIZED HEALTH CARE EDUCATION/TRAINING PROGRAM

## (undated) DEVICE — DRESSING MEPILEX AG BORDER 4 X 10 IN

## (undated) DEVICE — PINNACLE INTRODUCER SHEATH: Brand: PINNACLE

## (undated) DEVICE — Device

## (undated) DEVICE — BETHLEHEM UNIVERSAL SPINE, KIT: Brand: CARDINAL HEALTH

## (undated) DEVICE — GLOVE SRG BIOGEL 7.5

## (undated) DEVICE — CATH GUIDE LAUNCHER 6FR EBU 3.5

## (undated) DEVICE — CATH DIAG 6FR IMPULSE 100CM FR4

## (undated) DEVICE — ANTIBACTERIAL VIOLET BRAIDED (POLYGLACTIN 910), SYNTHETIC ABSORBABLE SUTURE: Brand: COATED VICRYL

## (undated) DEVICE — PROXIMATE PLUS MD MULTI-DIRECTIONAL RELEASE SKIN STAPLERS CONTAINS 35 STAINLESS STEEL STAPLES APPROXIMATE CLOSED DIMENSIONS: 6.9MM X 3.9MM WIDE: Brand: PROXIMATE

## (undated) DEVICE — NEURO PATTIES 1/2 X 1 1/2

## (undated) DEVICE — INTENDED FOR TISSUE SEPARATION, AND OTHER PROCEDURES THAT REQUIRE A SHARP SURGICAL BLADE TO PUNCTURE OR CUT.: Brand: BARD-PARKER ® CARBON RIB-BACK BLADES

## (undated) DEVICE — SUT SILK 2-0 SH 30 IN K833H

## (undated) DEVICE — 3M™ TEGADERM™ TRANSPARENT FILM DRESSING FRAME STYLE, 1626W, 4 IN X 4-3/4 IN (10 CM X 12 CM), 50/CT 4CT/CASE: Brand: 3M™ TEGADERM™

## (undated) DEVICE — CATH DIAG 6FR IMPULSE 100CM FL4

## (undated) DEVICE — DRAPE EQUIPMENT RF WAND

## (undated) DEVICE — REM POLYHESIVE ADULT PATIENT RETURN ELECTRODE: Brand: VALLEYLAB

## (undated) DEVICE — PREP SURGICAL PURPREP 26ML

## (undated) DEVICE — DGW .035 FC J3MM 260CM TEF: Brand: EMERALD

## (undated) DEVICE — BOWL ASSY BM210 DUAL BLADE DISPOSABLE: Brand: MIDAS REX™

## (undated) DEVICE — DGW .035 FC J3MM 150CM TEF: Brand: EMERALD

## (undated) DEVICE — 3M™ IOBAN™ 2 ANTIMICROBIAL INCISE DRAPE 6640EZ: Brand: IOBAN™ 2

## (undated) DEVICE — FLOSEAL MATRIX IS INDICATED IN SURGICAL PROCEDURES (OTHER THAN IN OPHTHALMIC) AS AN ADJUNCT TO HEMOSTASIS WHEN CONTROL OF BLEEDING BY LIGATURE OR CONVENTIONALPROCEDURES IS INEFFECTIVE OR IMPRACTICAL.: Brand: FLOSEAL HEMOSTATIC MATRIX

## (undated) DEVICE — PLUMEPEN PRO 10FT

## (undated) DEVICE — BLADE ELECTRODE: Brand: EDGE

## (undated) DEVICE — TOOL 15BA50 LEGEND 15CM 5MM BA: Brand: MIDAS REX™

## (undated) DEVICE — TIBURON SPLIT SHEET: Brand: CONVERTORS

## (undated) DEVICE — GLOVE INDICATOR PI UNDERGLOVE SZ 7.5 BLUE

## (undated) DEVICE — DRAPE SHEET X-LG

## (undated) DEVICE — INSULATED BLADE ELECTRODE: Brand: EDGE

## (undated) DEVICE — MICROPUNCTURE INTRODUCER SET SILHOUETTE TRANSITIONLESS PUSH-PLUS DESIGN - STIFFENED CANNULA WITH NITINOL WIRE GUIDE: Brand: MICROPUNCTURE

## (undated) DEVICE — ELECTRODE BLADE MOD E-Z CLEAN 4IN -0014AM

## (undated) DEVICE — DRAPE C-ARMOUR

## (undated) DEVICE — LIGHT HANDLE COVER SLEEVE DISP BLUE STELLAR

## (undated) DEVICE — JACKSON-PRATT 100CC BULB RESERVOIR: Brand: CARDINAL HEALTH

## (undated) DEVICE — PROGRAMMER PATIENT THERAPHY MANAGER RS2

## (undated) DEVICE — BUTTON SWITCH PENCIL HOLSTER: Brand: VALLEYLAB

## (undated) DEVICE — SPECIMEN CONTAINER STERILE PEEL PACK

## (undated) DEVICE — TOOL 14MH30 LEGEND 14CM 3MM: Brand: MIDAS REX ™

## (undated) DEVICE — INTENDED FOR TISSUE SEPARATION, AND OTHER PROCEDURES THAT REQUIRE A SHARP SURGICAL BLADE TO PUNCTURE OR CUT.: Brand: BARD-PARKER SAFETY BLADES SIZE 10, STERILE

## (undated) DEVICE — TRAY FOLEY 16FR URIMETER SURESTEP

## (undated) DEVICE — BIPOLAR SEALER 23-113-1 AQM 2.3: Brand: AQUAMANTYS™

## (undated) DEVICE — RECHARGER PRGRMR THERAPHY MANAGER RS2

## (undated) DEVICE — JACKSON TABLE FOAM POSITIONING KIT: Brand: CARDINAL HEALTH

## (undated) DEVICE — JP PERF DRN SIL FLT 7MM FULL: Brand: CARDINAL HEALTH

## (undated) DEVICE — DRESSING MEPILEX AG BORDER 4 X 4 IN

## (undated) DEVICE — SNAP KOVER: Brand: UNBRANDED

## (undated) DEVICE — SPONGE PVP SCRUB WING STERILE